# Patient Record
Sex: FEMALE | Race: BLACK OR AFRICAN AMERICAN | NOT HISPANIC OR LATINO | Employment: UNEMPLOYED | URBAN - METROPOLITAN AREA
[De-identification: names, ages, dates, MRNs, and addresses within clinical notes are randomized per-mention and may not be internally consistent; named-entity substitution may affect disease eponyms.]

---

## 2023-02-25 PROBLEM — L74.9 SWEATING ABNORMALITY: Status: ACTIVE | Noted: 2023-02-25

## 2023-02-25 PROBLEM — B97.7 HIGH RISK HPV INFECTION: Status: ACTIVE | Noted: 2023-02-25

## 2023-02-25 PROBLEM — N18.6 ESRD (END STAGE RENAL DISEASE) ON DIALYSIS (MULTI): Status: ACTIVE | Noted: 2023-02-25

## 2023-02-25 PROBLEM — R06.02 SHORTNESS OF BREATH: Status: ACTIVE | Noted: 2023-02-25

## 2023-02-25 PROBLEM — Z94.9 TRANSPLANT: Status: ACTIVE | Noted: 2023-02-25

## 2023-02-25 PROBLEM — F41.9 ANXIETY: Status: ACTIVE | Noted: 2023-02-25

## 2023-02-25 PROBLEM — R09.89 CAROTID BRUIT: Status: ACTIVE | Noted: 2023-02-25

## 2023-02-25 PROBLEM — K29.70 GASTRITIS: Status: ACTIVE | Noted: 2023-02-25

## 2023-02-25 PROBLEM — N17.9 ACUTE KIDNEY INJURY SUPERIMPOSED ON CHRONIC KIDNEY DISEASE (CMS-HCC): Status: ACTIVE | Noted: 2023-02-25

## 2023-02-25 PROBLEM — N18.9 ACUTE KIDNEY INJURY SUPERIMPOSED ON CHRONIC KIDNEY DISEASE (CMS-HCC): Status: ACTIVE | Noted: 2023-02-25

## 2023-02-25 PROBLEM — R87.612 LOW GRADE SQUAMOUS INTRAEPITH LESION ON CYTOLOGIC SMEAR CERVIX (LGSIL): Status: ACTIVE | Noted: 2023-02-25

## 2023-02-25 PROBLEM — I10 HYPERTENSION: Status: ACTIVE | Noted: 2023-02-25

## 2023-02-25 PROBLEM — H52.209 ASTIGMATISM WITH PRESBYOPIA: Status: ACTIVE | Noted: 2023-02-25

## 2023-02-25 PROBLEM — N89.8 VAGINAL DRYNESS: Status: ACTIVE | Noted: 2023-02-25

## 2023-02-25 PROBLEM — G43.E09 CHRONIC MIGRAINE WITH AURA: Status: ACTIVE | Noted: 2023-02-25

## 2023-02-25 PROBLEM — Q12.0 CONGENITAL CATARACT: Status: ACTIVE | Noted: 2023-02-25

## 2023-02-25 PROBLEM — N13.30 HYDRONEPHROSIS: Status: ACTIVE | Noted: 2023-02-25

## 2023-02-25 PROBLEM — R31.9 HEMATURIA: Status: ACTIVE | Noted: 2023-02-25

## 2023-02-25 PROBLEM — T82.590A DIALYSIS AV FISTULA MALFUNCTION (CMS-HCC): Status: ACTIVE | Noted: 2023-02-25

## 2023-02-25 PROBLEM — F17.200 NICOTINE DEPENDENCE: Status: ACTIVE | Noted: 2023-02-25

## 2023-02-25 PROBLEM — G62.9 NEUROPATHY: Status: ACTIVE | Noted: 2023-02-25

## 2023-02-25 PROBLEM — N19 RENAL FAILURE: Status: ACTIVE | Noted: 2023-02-25

## 2023-02-25 PROBLEM — Z99.2 HEMODIALYSIS PATIENT (CMS-HCC): Status: ACTIVE | Noted: 2023-02-25

## 2023-02-25 PROBLEM — Z99.2 ESRD (END STAGE RENAL DISEASE) ON DIALYSIS (MULTI): Status: ACTIVE | Noted: 2023-02-25

## 2023-02-25 PROBLEM — E11.9 DIABETES MELLITUS TYPE 2, UNCOMPLICATED (MULTI): Status: ACTIVE | Noted: 2023-02-25

## 2023-02-25 PROBLEM — D50.9 IRON DEFICIENCY ANEMIA: Status: ACTIVE | Noted: 2023-02-25

## 2023-02-25 PROBLEM — G43.909 MIGRAINE: Status: ACTIVE | Noted: 2023-02-25

## 2023-02-25 PROBLEM — M25.511 SHOULDER PAIN, BILATERAL: Status: ACTIVE | Noted: 2023-02-25

## 2023-02-25 PROBLEM — R87.612 LOW GRADE SQUAMOUS INTRAEPITHELIAL LESION ON CYTOLOGIC SMEAR OF CERVIX (LGSIL): Status: ACTIVE | Noted: 2023-02-25

## 2023-02-25 PROBLEM — H52.4 ASTIGMATISM WITH PRESBYOPIA: Status: ACTIVE | Noted: 2023-02-25

## 2023-02-25 PROBLEM — R05.8 PRODUCTIVE COUGH: Status: ACTIVE | Noted: 2023-02-25

## 2023-02-25 PROBLEM — I50.30 HEART FAILURE WITH PRESERVED LEFT VENTRICULAR FUNCTION (HFPEF) (MULTI): Status: ACTIVE | Noted: 2023-02-25

## 2023-02-25 PROBLEM — L02.419 ABSCESS OF AXILLARY REGION: Status: ACTIVE | Noted: 2023-02-25

## 2023-02-25 PROBLEM — I31.39 PERICARDIAL EFFUSION (HHS-HCC): Status: ACTIVE | Noted: 2023-02-25

## 2023-02-25 PROBLEM — M25.512 SHOULDER PAIN, BILATERAL: Status: ACTIVE | Noted: 2023-02-25

## 2023-02-25 RX ORDER — FUROSEMIDE 40 MG/1
40 TABLET ORAL 2 TIMES DAILY
COMMUNITY
End: 2023-10-26 | Stop reason: SDUPTHER

## 2023-02-25 RX ORDER — FLUTICASONE PROPIONATE AND SALMETEROL 100; 50 UG/1; UG/1
1 POWDER RESPIRATORY (INHALATION) EVERY 12 HOURS
COMMUNITY
Start: 2022-01-27 | End: 2023-10-26 | Stop reason: SDUPTHER

## 2023-02-25 RX ORDER — HYDRALAZINE HYDROCHLORIDE 100 MG/1
1 TABLET, FILM COATED ORAL 2 TIMES DAILY
COMMUNITY
Start: 2021-09-18 | End: 2023-10-26 | Stop reason: ALTCHOICE

## 2023-02-25 RX ORDER — GABAPENTIN 300 MG/1
1 CAPSULE ORAL NIGHTLY
COMMUNITY
Start: 2017-04-21 | End: 2023-10-26 | Stop reason: SDUPTHER

## 2023-02-25 RX ORDER — CARVEDILOL 25 MG/1
1 TABLET ORAL 2 TIMES DAILY
COMMUNITY
Start: 2021-09-18 | End: 2023-10-26 | Stop reason: SDUPTHER

## 2023-02-25 RX ORDER — CLONIDINE HYDROCHLORIDE 0.1 MG/1
0.1 TABLET ORAL 2 TIMES DAILY
COMMUNITY
End: 2023-06-30

## 2023-02-25 RX ORDER — ATORVASTATIN CALCIUM 80 MG/1
1 TABLET, FILM COATED ORAL NIGHTLY
COMMUNITY
Start: 2021-04-19 | End: 2023-10-26 | Stop reason: SDUPTHER

## 2023-02-25 RX ORDER — LOSARTAN POTASSIUM 50 MG/1
1 TABLET ORAL DAILY
COMMUNITY
Start: 2022-09-12 | End: 2023-06-30

## 2023-02-25 RX ORDER — MULTIVITAMIN
1 TABLET ORAL DAILY
Status: ON HOLD | COMMUNITY
End: 2024-01-05 | Stop reason: ALTCHOICE

## 2023-02-25 RX ORDER — VALSARTAN 160 MG/1
160 TABLET ORAL DAILY
COMMUNITY
End: 2023-10-26 | Stop reason: SDUPTHER

## 2023-02-25 RX ORDER — HYDROXYZINE HYDROCHLORIDE 10 MG/1
1 TABLET, FILM COATED ORAL 3 TIMES DAILY PRN
COMMUNITY
Start: 2022-01-18 | End: 2023-10-26 | Stop reason: SDUPTHER

## 2023-02-25 RX ORDER — SUMATRIPTAN 50 MG/1
50 TABLET, FILM COATED ORAL ONCE AS NEEDED
Status: ON HOLD | COMMUNITY
Start: 2021-12-13 | End: 2024-01-05 | Stop reason: ALTCHOICE

## 2023-02-25 RX ORDER — AMLODIPINE BESYLATE 10 MG/1
1 TABLET ORAL DAILY
COMMUNITY
End: 2023-10-26 | Stop reason: ALTCHOICE

## 2023-02-25 RX ORDER — ALBUTEROL SULFATE 90 UG/1
1-2 AEROSOL, METERED RESPIRATORY (INHALATION)
COMMUNITY
Start: 2022-01-07 | End: 2023-10-26 | Stop reason: ALTCHOICE

## 2023-03-08 ENCOUNTER — APPOINTMENT (OUTPATIENT)
Dept: PRIMARY CARE | Facility: CLINIC | Age: 53
End: 2023-03-08
Payer: MEDICARE

## 2023-03-09 ENCOUNTER — OFFICE VISIT (OUTPATIENT)
Dept: PRIMARY CARE | Facility: CLINIC | Age: 53
End: 2023-03-09
Payer: MEDICARE

## 2023-03-09 VITALS
HEIGHT: 56 IN | WEIGHT: 141.2 LBS | HEART RATE: 97 BPM | TEMPERATURE: 97.9 F | BODY MASS INDEX: 31.76 KG/M2 | OXYGEN SATURATION: 98 % | DIASTOLIC BLOOD PRESSURE: 72 MMHG | SYSTOLIC BLOOD PRESSURE: 156 MMHG

## 2023-03-09 DIAGNOSIS — R06.02 SHORTNESS OF BREATH: Primary | ICD-10-CM

## 2023-03-09 DIAGNOSIS — R26.2 DIFFICULTY IN WALKING: ICD-10-CM

## 2023-03-09 PROCEDURE — 3066F NEPHROPATHY DOC TX: CPT | Performed by: STUDENT IN AN ORGANIZED HEALTH CARE EDUCATION/TRAINING PROGRAM

## 2023-03-09 PROCEDURE — 3077F SYST BP >= 140 MM HG: CPT | Performed by: STUDENT IN AN ORGANIZED HEALTH CARE EDUCATION/TRAINING PROGRAM

## 2023-03-09 PROCEDURE — 4010F ACE/ARB THERAPY RXD/TAKEN: CPT | Performed by: STUDENT IN AN ORGANIZED HEALTH CARE EDUCATION/TRAINING PROGRAM

## 2023-03-09 PROCEDURE — 99214 OFFICE O/P EST MOD 30 MIN: CPT | Performed by: STUDENT IN AN ORGANIZED HEALTH CARE EDUCATION/TRAINING PROGRAM

## 2023-03-09 PROCEDURE — 3078F DIAST BP <80 MM HG: CPT | Performed by: STUDENT IN AN ORGANIZED HEALTH CARE EDUCATION/TRAINING PROGRAM

## 2023-03-09 ASSESSMENT — PAIN SCALES - GENERAL: PAINLEVEL: 0-NO PAIN

## 2023-03-09 NOTE — PROGRESS NOTES
"Subjective   Patient ID: Stacey Heath is a 52 y.o. female who presents for Follow-up and Shortness of Breath.    Hospital Follow up  - Originally admitted for shortness of breath and then you were discharged on 02/23/23  - Symptoms were thought to be due to viral gastroenteritis due to abdominal pain   - Readmitted to [facility] on 02/27/23 due to continued difficulty with evidence of pulmonary edema  - Was admitted for 4 days and discharged after breathing had improved; patient received BiPAP for treatment   - Denies being started on any new medications; no adjustments made to regimen  - Spoke with nephro who recommended outpatient blood transfusion  - Requesting Zofran for nausea that occurs after dialysis       Review of Systems  Negative unless mentioned in HPI    Objective   /72 (BP Location: Left arm, Patient Position: Sitting, BP Cuff Size: Adult)   Pulse 97   Temp 36.6 °C (97.9 °F) (Temporal)   Ht 1.41 m (4' 7.5\")   Wt 64 kg (141 lb 3.2 oz)   SpO2 98%   BMI 32.23 kg/m²     Physical Exam  Constitutional: Well developed, awake, alert, oriented x3  Head and Face: NCAT  Eyes: Normal external exam, EOMI  ENT: Normal external inspection of ears and nose. Oropharynx normal.  Cardiovascular: RRR, S1/S2, no murmurs, rubs, or gallops, radial pulses +2, no edema of extremities  Pulmonary: CTAB, no respiratory distress.  Abdomen: +BS, soft, non-tender, nondistended, no guarding or rebound, no masses noted  Neuro: A&O x3, CN II-XII grossly intact  MSK: No joint swelling, normal movements of all extremities. Range of motion- normal.  Skin- No lesions, contusions, or erythema.  Psychiatric: Judgment intact. Appropriate mood and behavior     Assessment/Plan   52 year old F presenting to the clinic for a hospital follow up visit .    #Hospital follow up  - No adjustments made to medications while admitted; medications reviewed   - Renewed prescription for zofran for nausea occurring at dialysis   - Discussed " outpatient blood transfusions; will defer transfusion plan to nephrology at this time   - Prescription for rolling walker done    Plan for patient to follow up in clinic in the next 2-3 months for continued follow up.    Discussed with Dr. Taras Payne MD PGY-3  Family Medicine   [facility]

## 2023-03-24 NOTE — PROGRESS NOTES
I saw and evaluated the patient. I personally obtained the key and critical portions of the history and physical exam or was physically present for key and critical portions performed by the resident/fellow. I reviewed the resident/fellow's documentation and discussed the patient with the resident/fellow. I agree with the resident/fellow's medical decision making as documented in the note with the exception/addition of the following:  She was hospitalized for pulmonary edema owing to volume overload owing to missed dialysis sessions.   She required Bipap for aspiratory failure. She says that she has not missed any sessions [facility] since hospital discharge.  She feels well now and has no signs of pulmonary edema.  O2 sat 98% on ambient air.  Want s to continue to receive renal dialysis.      Deb Grajeda MD

## 2023-05-18 LAB
ALANINE AMINOTRANSFERASE (SGPT) (U/L) IN SER/PLAS: 5 U/L (ref 7–45)
ALBUMIN (G/DL) IN SER/PLAS: 2.8 G/DL (ref 3.4–5)
ALKALINE PHOSPHATASE (U/L) IN SER/PLAS: 87 U/L (ref 33–110)
ANION GAP IN SER/PLAS: 19 MMOL/L (ref 10–20)
ASPARTATE AMINOTRANSFERASE (SGOT) (U/L) IN SER/PLAS: 23 U/L (ref 9–39)
BILIRUBIN TOTAL (MG/DL) IN SER/PLAS: 0.3 MG/DL (ref 0–1.2)
CALCIUM (MG/DL) IN SER/PLAS: 9.2 MG/DL (ref 8.6–10.3)
CARBON DIOXIDE, TOTAL (MMOL/L) IN SER/PLAS: 29 MMOL/L (ref 21–32)
CHLORIDE (MMOL/L) IN SER/PLAS: 92 MMOL/L (ref 98–107)
CREATININE (MG/DL) IN SER/PLAS: 8.95 MG/DL (ref 0.5–1.05)
ERYTHROCYTE DISTRIBUTION WIDTH (RATIO) BY AUTOMATED COUNT: 15.2 % (ref 11.5–14.5)
ERYTHROCYTE MEAN CORPUSCULAR HEMOGLOBIN CONCENTRATION (G/DL) BY AUTOMATED: 29.2 G/DL (ref 32–36)
ERYTHROCYTE MEAN CORPUSCULAR VOLUME (FL) BY AUTOMATED COUNT: 98 FL (ref 80–100)
ERYTHROCYTES (10*6/UL) IN BLOOD BY AUTOMATED COUNT: 2.69 X10E12/L (ref 4–5.2)
GFR FEMALE: 5 ML/MIN/1.73M2
GLUCOSE (MG/DL) IN SER/PLAS: 59 MG/DL (ref 74–99)
HEMATOCRIT (%) IN BLOOD BY AUTOMATED COUNT: 26.4 % (ref 36–46)
HEMOGLOBIN (G/DL) IN BLOOD: 7.7 G/DL (ref 12–16)
LEUKOCYTES (10*3/UL) IN BLOOD BY AUTOMATED COUNT: 12.3 X10E9/L (ref 4.4–11.3)
PLATELETS (10*3/UL) IN BLOOD AUTOMATED COUNT: 453 X10E9/L (ref 150–450)
POTASSIUM (MMOL/L) IN SER/PLAS: 6.1 MMOL/L (ref 3.5–5.3)
PROTEIN TOTAL: 5.9 G/DL (ref 6.4–8.2)
SODIUM (MMOL/L) IN SER/PLAS: 134 MMOL/L (ref 136–145)
UREA NITROGEN (MG/DL) IN SER/PLAS: 28 MG/DL (ref 6–23)

## 2023-05-21 LAB
ALANINE AMINOTRANSFERASE (SGPT) (U/L) IN SER/PLAS: 11 U/L (ref 7–45)
ALBUMIN (G/DL) IN SER/PLAS: 3 G/DL (ref 3.4–5)
ALKALINE PHOSPHATASE (U/L) IN SER/PLAS: 91 U/L (ref 33–110)
ANION GAP IN SER/PLAS: 14 MMOL/L (ref 10–20)
ASPARTATE AMINOTRANSFERASE (SGOT) (U/L) IN SER/PLAS: 30 U/L (ref 9–39)
BILIRUBIN TOTAL (MG/DL) IN SER/PLAS: 0.3 MG/DL (ref 0–1.2)
CALCIUM (MG/DL) IN SER/PLAS: 9.9 MG/DL (ref 8.6–10.3)
CARBON DIOXIDE, TOTAL (MMOL/L) IN SER/PLAS: 31 MMOL/L (ref 21–32)
CHLORIDE (MMOL/L) IN SER/PLAS: 98 MMOL/L (ref 98–107)
CREATININE (MG/DL) IN SER/PLAS: 4.48 MG/DL (ref 0.5–1.05)
ERYTHROCYTE DISTRIBUTION WIDTH (RATIO) BY AUTOMATED COUNT: 15.1 % (ref 11.5–14.5)
ERYTHROCYTE MEAN CORPUSCULAR HEMOGLOBIN CONCENTRATION (G/DL) BY AUTOMATED: 28.7 G/DL (ref 32–36)
ERYTHROCYTE MEAN CORPUSCULAR VOLUME (FL) BY AUTOMATED COUNT: 97 FL (ref 80–100)
ERYTHROCYTES (10*6/UL) IN BLOOD BY AUTOMATED COUNT: 3.02 X10E12/L (ref 4–5.2)
GFR FEMALE: 11 ML/MIN/1.73M2
GLUCOSE (MG/DL) IN SER/PLAS: 100 MG/DL (ref 74–99)
HEMATOCRIT (%) IN BLOOD BY AUTOMATED COUNT: 29.3 % (ref 36–46)
HEMOGLOBIN (G/DL) IN BLOOD: 8.4 G/DL (ref 12–16)
LEUKOCYTES (10*3/UL) IN BLOOD BY AUTOMATED COUNT: 8.9 X10E9/L (ref 4.4–11.3)
PLATELETS (10*3/UL) IN BLOOD AUTOMATED COUNT: 450 X10E9/L (ref 150–450)
POTASSIUM (MMOL/L) IN SER/PLAS: 4.6 MMOL/L (ref 3.5–5.3)
PROTEIN TOTAL: 6.6 G/DL (ref 6.4–8.2)
SODIUM (MMOL/L) IN SER/PLAS: 138 MMOL/L (ref 136–145)
UREA NITROGEN (MG/DL) IN SER/PLAS: 12 MG/DL (ref 6–23)

## 2023-05-23 LAB
ANION GAP IN SER/PLAS: 18 MMOL/L (ref 10–20)
CALCIUM (MG/DL) IN SER/PLAS: 10 MG/DL (ref 8.6–10.3)
CARBON DIOXIDE, TOTAL (MMOL/L) IN SER/PLAS: 29 MMOL/L (ref 21–32)
CHLORIDE (MMOL/L) IN SER/PLAS: 96 MMOL/L (ref 98–107)
CREATININE (MG/DL) IN SER/PLAS: 8.9 MG/DL (ref 0.5–1.05)
ERYTHROCYTE DISTRIBUTION WIDTH (RATIO) BY AUTOMATED COUNT: 15.5 % (ref 11.5–14.5)
ERYTHROCYTE MEAN CORPUSCULAR HEMOGLOBIN CONCENTRATION (G/DL) BY AUTOMATED: 27.7 G/DL (ref 32–36)
ERYTHROCYTE MEAN CORPUSCULAR VOLUME (FL) BY AUTOMATED COUNT: 100 FL (ref 80–100)
ERYTHROCYTES (10*6/UL) IN BLOOD BY AUTOMATED COUNT: 3.11 X10E12/L (ref 4–5.2)
GFR FEMALE: 5 ML/MIN/1.73M2
GLUCOSE (MG/DL) IN SER/PLAS: 112 MG/DL (ref 74–99)
HEMATOCRIT (%) IN BLOOD BY AUTOMATED COUNT: 31 % (ref 36–46)
HEMOGLOBIN (G/DL) IN BLOOD: 8.6 G/DL (ref 12–16)
LEUKOCYTES (10*3/UL) IN BLOOD BY AUTOMATED COUNT: 8.7 X10E9/L (ref 4.4–11.3)
PLATELETS (10*3/UL) IN BLOOD AUTOMATED COUNT: 461 X10E9/L (ref 150–450)
POTASSIUM (MMOL/L) IN SER/PLAS: 5.5 MMOL/L (ref 3.5–5.3)
SODIUM (MMOL/L) IN SER/PLAS: 137 MMOL/L (ref 136–145)
UREA NITROGEN (MG/DL) IN SER/PLAS: 28 MG/DL (ref 6–23)

## 2023-05-26 ENCOUNTER — TELEPHONE (OUTPATIENT)
Dept: PRIMARY CARE | Facility: CLINIC | Age: 53
End: 2023-05-26

## 2023-05-26 NOTE — TELEPHONE ENCOUNTER
A representative from [facility] called requesting home healthcare authorization for patient    [facility]  Representative: Yen  [phone]

## 2023-06-09 ENCOUNTER — OFFICE VISIT (OUTPATIENT)
Dept: PRIMARY CARE | Facility: CLINIC | Age: 53
End: 2023-06-09
Payer: MEDICARE

## 2023-06-09 VITALS
DIASTOLIC BLOOD PRESSURE: 80 MMHG | TEMPERATURE: 98.1 F | SYSTOLIC BLOOD PRESSURE: 156 MMHG | HEIGHT: 55 IN | HEART RATE: 87 BPM | WEIGHT: 136.2 LBS | RESPIRATION RATE: 16 BRPM | BODY MASS INDEX: 31.52 KG/M2 | OXYGEN SATURATION: 99 %

## 2023-06-09 DIAGNOSIS — M25.512 BILATERAL SHOULDER PAIN, UNSPECIFIED CHRONICITY: ICD-10-CM

## 2023-06-09 DIAGNOSIS — M25.511 BILATERAL SHOULDER PAIN, UNSPECIFIED CHRONICITY: ICD-10-CM

## 2023-06-09 DIAGNOSIS — R06.02 SOB (SHORTNESS OF BREATH): Primary | ICD-10-CM

## 2023-06-09 DIAGNOSIS — N76.0 ACUTE VAGINITIS: ICD-10-CM

## 2023-06-09 PROCEDURE — 3044F HG A1C LEVEL LT 7.0%: CPT | Performed by: STUDENT IN AN ORGANIZED HEALTH CARE EDUCATION/TRAINING PROGRAM

## 2023-06-09 PROCEDURE — 3079F DIAST BP 80-89 MM HG: CPT | Performed by: STUDENT IN AN ORGANIZED HEALTH CARE EDUCATION/TRAINING PROGRAM

## 2023-06-09 PROCEDURE — 4010F ACE/ARB THERAPY RXD/TAKEN: CPT | Performed by: STUDENT IN AN ORGANIZED HEALTH CARE EDUCATION/TRAINING PROGRAM

## 2023-06-09 PROCEDURE — 3008F BODY MASS INDEX DOCD: CPT | Performed by: STUDENT IN AN ORGANIZED HEALTH CARE EDUCATION/TRAINING PROGRAM

## 2023-06-09 PROCEDURE — 1036F TOBACCO NON-USER: CPT | Performed by: STUDENT IN AN ORGANIZED HEALTH CARE EDUCATION/TRAINING PROGRAM

## 2023-06-09 PROCEDURE — 3066F NEPHROPATHY DOC TX: CPT | Performed by: STUDENT IN AN ORGANIZED HEALTH CARE EDUCATION/TRAINING PROGRAM

## 2023-06-09 PROCEDURE — 99213 OFFICE O/P EST LOW 20 MIN: CPT | Performed by: STUDENT IN AN ORGANIZED HEALTH CARE EDUCATION/TRAINING PROGRAM

## 2023-06-09 PROCEDURE — 3077F SYST BP >= 140 MM HG: CPT | Performed by: STUDENT IN AN ORGANIZED HEALTH CARE EDUCATION/TRAINING PROGRAM

## 2023-06-09 RX ORDER — FLUCONAZOLE 150 MG/1
150 TABLET ORAL ONCE
Qty: 1 TABLET | Refills: 1 | Status: SHIPPED | OUTPATIENT
Start: 2023-06-09 | End: 2023-06-09

## 2023-06-09 ASSESSMENT — PAIN SCALES - GENERAL: PAINLEVEL: 6

## 2023-06-09 NOTE — PROGRESS NOTES
"Subjective   Patient ID: Stacey Heath is a 53 y.o. female who presents for Follow-up (Breathing ) and Breathing Problem.    HPI   Patient is here today for hospital follow up   She was admitted April 27-May/16 and then re-admitted May18- May20th   She was admitted for hypertensive urgency, fever and chills and was found to be bacteremic.   It was noted that the patient had multiple admissions with bacteremia and there was a questionable diagnosis of endocarditis during her admission given her bacteremia.   During her admission she also had a washout procedure of her L&R shoulders due to a concern of possible septic arthritis as a source of bacteremia.   Patient was on IV antibiotics and she was discharged on IV daptomycin with a stop date 6/1/2023   Patient was discharged to SNF  She has been taking all her medications, she didn't miss any doses   Dialysis TTS, no missed HD, through [facility], [facility] dry weight 64 kg (doesn't think it is correct)   No fever, no chills since leaving the hospital and the SNF   Coughing, but no phlegm or discharge    She has been doing ok, good days and bad days   Experiencing on and off SOB without associated CP   No associated lower extremity edema   Makes little urina and there has been no change to the amount of her urine     Review of Systems  Review of systems is negative besides what is mentioned in the HPI      Objective   /80 (BP Location: Left arm, Patient Position: Sitting, BP Cuff Size: Small adult)   Pulse 87   Temp 36.7 °C (98.1 °F) (Temporal)   Resp 16   Ht 1.397 m (4' 7\")   Wt 61.8 kg (136 lb 3.2 oz)   SpO2 99%   BMI 31.66 kg/m²     Physical Exam  General: Alert and oriented*3, not in acute distress   Cardiac: S1, S2 normal, no murmurs, +1 bilateral lower extremity edema.  Respiratory: CTAB, no wheezing or crackles   Skin: shoulder incision site healed well, no surrounding erythema, scar hyperpigmented, no drainage or induration  Psych: appropriate mood and " affect to the clinical setting      Assessment/Plan   Ms. 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   Here today for hospital follow after an admission for bacteremia.   No signs to suggest any infection.   No signs to suggest fluid overload (no crackles, mild lower extremity edema), no changes to weight per patient's reported dry weight (which she reports is inaccurate.   Will order a CXR to assess if there are any signs of volume overload     Patient's HPI, physical exam and plan of care was discussed with the attending physician Dr. Serena Romero MD  Family Medicine, PGY-3  [place]

## 2023-06-13 NOTE — PROGRESS NOTES
I reviewed with the resident the medical history and the resident’s findings on physical examination.  I discussed with the resident the patient’s diagnosis and concur with the treatment plan as documented in the resident note.   Here for Hospital follow up: slowly improving.  Luisa Lyons MD

## 2023-06-29 ENCOUNTER — APPOINTMENT (OUTPATIENT)
Dept: PRIMARY CARE | Facility: CLINIC | Age: 53
End: 2023-06-29
Payer: MEDICARE

## 2023-06-30 ENCOUNTER — OFFICE VISIT (OUTPATIENT)
Dept: PRIMARY CARE | Facility: CLINIC | Age: 53
End: 2023-06-30
Payer: MEDICARE

## 2023-06-30 VITALS
SYSTOLIC BLOOD PRESSURE: 192 MMHG | WEIGHT: 133.7 LBS | BODY MASS INDEX: 30.94 KG/M2 | OXYGEN SATURATION: 99 % | TEMPERATURE: 96.1 F | HEART RATE: 89 BPM | HEIGHT: 55 IN | RESPIRATION RATE: 16 BRPM | DIASTOLIC BLOOD PRESSURE: 88 MMHG

## 2023-06-30 DIAGNOSIS — K59.04 CHRONIC IDIOPATHIC CONSTIPATION: Primary | ICD-10-CM

## 2023-06-30 DIAGNOSIS — R11.2 NAUSEA AND VOMITING, UNSPECIFIED VOMITING TYPE: ICD-10-CM

## 2023-06-30 PROCEDURE — 3008F BODY MASS INDEX DOCD: CPT | Performed by: STUDENT IN AN ORGANIZED HEALTH CARE EDUCATION/TRAINING PROGRAM

## 2023-06-30 PROCEDURE — 3079F DIAST BP 80-89 MM HG: CPT | Performed by: STUDENT IN AN ORGANIZED HEALTH CARE EDUCATION/TRAINING PROGRAM

## 2023-06-30 PROCEDURE — 99214 OFFICE O/P EST MOD 30 MIN: CPT | Performed by: STUDENT IN AN ORGANIZED HEALTH CARE EDUCATION/TRAINING PROGRAM

## 2023-06-30 PROCEDURE — 3044F HG A1C LEVEL LT 7.0%: CPT | Performed by: STUDENT IN AN ORGANIZED HEALTH CARE EDUCATION/TRAINING PROGRAM

## 2023-06-30 PROCEDURE — 3077F SYST BP >= 140 MM HG: CPT | Performed by: STUDENT IN AN ORGANIZED HEALTH CARE EDUCATION/TRAINING PROGRAM

## 2023-06-30 PROCEDURE — 4010F ACE/ARB THERAPY RXD/TAKEN: CPT | Performed by: STUDENT IN AN ORGANIZED HEALTH CARE EDUCATION/TRAINING PROGRAM

## 2023-06-30 PROCEDURE — 3066F NEPHROPATHY DOC TX: CPT | Performed by: STUDENT IN AN ORGANIZED HEALTH CARE EDUCATION/TRAINING PROGRAM

## 2023-06-30 PROCEDURE — 1036F TOBACCO NON-USER: CPT | Performed by: STUDENT IN AN ORGANIZED HEALTH CARE EDUCATION/TRAINING PROGRAM

## 2023-06-30 RX ORDER — POLYETHYLENE GLYCOL 3350 17 G/17G
17 POWDER, FOR SOLUTION ORAL 2 TIMES DAILY
Qty: 72 EACH | Refills: 3 | Status: SHIPPED | OUTPATIENT
Start: 2023-06-30 | End: 2024-01-09 | Stop reason: HOSPADM

## 2023-06-30 RX ORDER — DOCUSATE SODIUM 100 MG/1
200 CAPSULE, LIQUID FILLED ORAL 2 TIMES DAILY
Qty: 60 CAPSULE | Refills: 0 | Status: SHIPPED | OUTPATIENT
Start: 2023-06-30 | End: 2023-10-26 | Stop reason: ALTCHOICE

## 2023-06-30 RX ORDER — ONDANSETRON 4 MG/1
4 TABLET, FILM COATED ORAL EVERY 8 HOURS PRN
Qty: 90 TABLET | Refills: 0 | Status: ON HOLD | OUTPATIENT
Start: 2023-06-30 | End: 2024-01-09 | Stop reason: SDUPTHER

## 2023-06-30 ASSESSMENT — PAIN SCALES - GENERAL: PAINLEVEL: 4

## 2023-06-30 NOTE — PROGRESS NOTES
"Subjective   Patient ID: Stacey Heath is a 53 y.o. female who presents for Follow-up (Just released from hospital, blood pressure ).  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    #HTN  Had dialysis yesterday  And at dialysis her blood pressure is high before starting, tends to lower during, and is high again post dialysis  She has not been seen by her nephro doctor since march  She was recently admitted and discharged on 6/21 for hypertensive emergency , clonidine patch was added to her medications, she is currently wearing  She endorses compliance with her bp medications and states she takes them everyday  Tales Amlodipine in am, takes Carvedilol morning night, Clonidine patch every 7 days- put patch on Wednesday, Lasix morning and night, Hydralazine TID and Valsartan ?  Denies any chest pain, sob, dizziness, did have a headache earlier this week but denies current headache.     #abdominal pain  -associated with vomiting, nonbloody  -usually in the morning  -feels nausea before  -Epigastric abdominal  -denies smoking and etoh since 2 years   -she is trying to get a kidney transplant   -had abdominal pain in the past when she had her gallbladder removed   -ate some oatmeal this morning but through it up  -sharp pains that intermittent that last for a couple of hours   -denies marijuana use  -states she has a bowel movement only once a week     Review of Systems  ROS negative except for above mentioned   Objective   BP (!) 206/60 (BP Location: Left arm, Patient Position: Sitting, BP Cuff Size: Small adult)   Pulse 92   Temp 35.6 °C (96.1 °F) (Temporal)   Resp 16   Ht 1.397 m (4' 7\")   Wt 60.6 kg (133 lb 11.2 oz)   SpO2 97%   BMI 31.07 kg/m²     Physical Exam  General: Well developed, well nourished, alert and cooperative, appears to be in no acute " distress.   HEENT: Normocephalic, atraumatic.   Cardiac: continuous systolic murmur appreciated.   Lungs: Clear to auscultation bilaterally. No rales, rhonchi, wheezing, diminished breath sounds.   Abdomen: Bowel sounds x4. Soft, nondistended, epigastric, RUQ and LUQ tenderness on palpation. No guarding, rebound, or masses.   MSK: ROM intact spine and extremities.  No edema. Peripheral pulses intact.   Neuro: no focal deficits noted    Skin: Dialysis access sited noted on right arm.   Psych: Oriented to person, place, and time.    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  Problem List Items Addressed This Visit    None  #HTN  Chronic, uncontrolled likely 2/2 to ESRD  -continue with current medication regimen  -nephrology follow up strongly encouraged    #Abdominal pain  With associated n/v  Likely 2/2 to constipation  -Start Miralax BID + colace BID until bowel movements regulate  -Zofran 4 mg prn for n/v  -red flag, rtc/ed signs discussed with patient, patient in agreement and understanding    Patient seen and discussed with attending, Dr. Taras Bustos MD  Family Medicine, PGY-3

## 2023-07-05 NOTE — PROGRESS NOTES
I saw and evaluated the patient. I personally obtained the key and critical portions of the history and physical exam or was physically present for key and critical portions performed by the resident/fellow. I reviewed the resident/fellow's documentation and discussed the patient with the resident/fellow. I agree with the resident/fellow's medical decision making as documented in the note with the exception/addition of the following:    Additional diagnoses addressed:  End-stage renal  disease, adherent to hemodialysis.  Labile, uncontrolled hypertension.    Hospital followup for hypertensive emergency (change in mental status, now resolved).    Plan is for this to be managed in conjunction with nephrologist during dialysis.  Does not appear grossly volume overloaded.  Clonidine patch started recently, and is in place.  May need dose titration.    Nuclear medicine cardiac stress test 5/26 did not reveal cause for heart murmur.  It does not sound like a rub.    Concerning diffuse nonspecific abdominal pain, frequent vomiting, and severe constipation.  All may be partly related to uremia, and even to medication side effects,  but so far, workup (CT abdomen and pelvis)  has not revealed specific pathology.        Deb Grajeda MD

## 2023-07-27 ENCOUNTER — OFFICE VISIT (OUTPATIENT)
Dept: PRIMARY CARE | Facility: CLINIC | Age: 53
End: 2023-07-27
Payer: COMMERCIAL

## 2023-07-27 VITALS
DIASTOLIC BLOOD PRESSURE: 76 MMHG | HEART RATE: 89 BPM | SYSTOLIC BLOOD PRESSURE: 131 MMHG | TEMPERATURE: 97.4 F | OXYGEN SATURATION: 97 %

## 2023-07-27 DIAGNOSIS — M25.512 CHRONIC PAIN OF BOTH SHOULDERS: Primary | ICD-10-CM

## 2023-07-27 DIAGNOSIS — K59.04 CHRONIC IDIOPATHIC CONSTIPATION: ICD-10-CM

## 2023-07-27 DIAGNOSIS — M25.511 CHRONIC PAIN OF BOTH SHOULDERS: Primary | ICD-10-CM

## 2023-07-27 DIAGNOSIS — G89.29 CHRONIC PAIN OF BOTH SHOULDERS: Primary | ICD-10-CM

## 2023-07-27 PROCEDURE — 99213 OFFICE O/P EST LOW 20 MIN: CPT

## 2023-07-27 PROCEDURE — 4010F ACE/ARB THERAPY RXD/TAKEN: CPT

## 2023-07-27 PROCEDURE — 3078F DIAST BP <80 MM HG: CPT

## 2023-07-27 PROCEDURE — 1036F TOBACCO NON-USER: CPT

## 2023-07-27 PROCEDURE — 3075F SYST BP GE 130 - 139MM HG: CPT

## 2023-07-27 PROCEDURE — 3008F BODY MASS INDEX DOCD: CPT

## 2023-07-27 PROCEDURE — 3044F HG A1C LEVEL LT 7.0%: CPT

## 2023-07-27 PROCEDURE — 3066F NEPHROPATHY DOC TX: CPT

## 2023-07-27 RX ORDER — POLYETHYLENE GLYCOL 3350 17 G/17G
17 POWDER, FOR SOLUTION ORAL DAILY
Qty: 30 PACKET | Refills: 2 | Status: SHIPPED | OUTPATIENT
Start: 2023-07-27 | End: 2023-10-25

## 2023-07-27 ASSESSMENT — PAIN SCALES - GENERAL: PAINLEVEL: 10-WORST PAIN EVER

## 2023-07-27 NOTE — PROGRESS NOTES
Subjective   Patient ID: Stacey Heath is a 53 y.o. female who presents for Follow-up.      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     #Right shoulder pain  - Onset a few weeks ago has gotten worse however chronic issue s/p washout of b/l shoulders 2/2 bacteremia concern  - Located Right anterior shoulder  - Characterized by pop sensation and anterior R should pain  - Aggravating abduction  - Tylenol doesn't  - XR (June 2023) Bilateral erosive changes of the greater tuberosities at the  insertions of the rotator cuff, left greater than right. This may be  a result of subinsertional osteolysis in the setting of end-stage  renal disease  - c/w Tylenol as unable to take NSAIDs  - PT  - Orthopedic referral if no improvement with therapy    #HTN  #ESRD on HD  -Recent hospitalization for large fluctuations in BP and ED visit for headache in setting of elevated BP. EKG troponin BNP chest x-ray and the labs as well as a CT head without any acute findings.  - /76. Had HD this morning at 5am  - can still have BP >200 prior   - TTS HD schedule  -Instructed to    Take amlodipine and carvedilol BEFORE dialysis   Take valsartan, carvedilol and hydralazine AFTER dialysis.   DO NOT take hydralazine prior to dialysis.   - Before HD amlodipine 10mg and Clonidine 0.1mg Takes hydralzine 100mg TID, Valsartan 160mg every day after HD.  - on non HD days, takes meds all at once. Feels drowsy and lightheaded. Sat on couch and not moving. BP 110s.  ----> take hydralazine 100mg BID away from  am pill pack     #HF  - takes Lasix 40mg BID , carvedilol 25mg BID    A1c 4.6 June 27    #Abdominal pain  - 2 months ago. Pain comes and goes but lasts for months  - Pain starts lower abdomen and migrates upper  - s/p cholecystectomy, appendectomy   - cannot tolerate oral intake  - Reports  emesis last night  - reports diarrhea.  - constipation BM 2-3 times weekly  miralax, stimulant,   - denies blood in stool  - Fiber helped with belly pain  - bentyl 10mg not working  - Diet- toast for breakfast that's it  ----> c/w bentyl?  ----> miralax   ----> restart fiber diet  ----> GI referall     Review of Systems    Previous history  Past Medical History:   Diagnosis Date    Breast pain 05/18/2021    History of breast pain    Dependence on renal dialysis (CMS/[facility]) 11/21/2022    Hemodialysis patient    Dry eye syndrome of bilateral lacrimal glands 03/07/2017    Dry eyes    Essential (primary) hypertension 12/27/2022    Hypertension    History of acute pancreatitis 12/21/2020    History of acute pancreatitis    Migraines     History of migraine    Other conditions influencing health status 09/01/2021    History of nephrostomy    Unspecified diastolic (congestive) heart failure (CMS/[facility]) 11/21/2022    Heart failure with preserved left ventricular function (HFpEF)    Vaginal dryness 07/29/2022    Vaginal dryness     Past Surgical History:   Procedure Laterality Date    APPENDECTOMY  03/07/2017    Appendectomy    CT ABDOMEN ANGIOGRAM W AND/OR WO IV CONTRAST  4/23/2023    CT ABDOMEN ANGIOGRAM W AND/OR WO IV CONTRAST [facility] CT    IR VENOGRAM DIALYSIS  8/5/2021    IR VENOGRAM DIALYSIS 8/5/2021    OTHER SURGICAL HISTORY  03/07/2017    Cystoscopy With Pyeloscopy With Removal Of Calculus    OTHER SURGICAL HISTORY  03/07/2017    Anoscopy For Polyp Removal    OTHER SURGICAL HISTORY  12/08/2021    Arteriovenous fistula creation procedure    OTHER SURGICAL HISTORY  12/08/2021    Dialysis tunneled catheter placement    TUBAL LIGATION  03/07/2017    Tubal Ligation     Social History     Tobacco Use    Smoking status: Never    Smokeless tobacco: Never   Substance Use Topics    Alcohol use: Never    Drug use: Never     Family History   Problem Relation Name Age of Onset    Other (Cerebrovascular Accident) Father      Heart failure  Paternal Grandmother      Breast cancer Paternal Grandmother      Other (Primary Cervical Cancer) Paternal Grandmother      Diabetes Paternal Grandfather       Allergies   Allergen Reactions    Codeine Unknown and Itching    Iodine Hives and Unknown    Shellfish Containing Products Swelling     SEAFOOD     Current Outpatient Medications   Medication Instructions    albuterol 90 mcg/actuation inhaler 1-2 puffs, inhalation, EVERY 4 TO 6 HOURS AS NEEDED.    amLODIPine (Norvasc) 10 mg tablet 1 tablet, oral, Daily, Take before dialysis.    atorvastatin (Lipitor) 80 mg tablet 1 tablet, oral, Nightly    carvedilol (Coreg) 25 mg tablet 1 tablet, oral, 2 times daily    docusate sodium (COLACE) 200 mg, oral, 2 times daily    fluticasone propion-salmeteroL (Advair Diskus) 100-50 mcg/dose diskus inhaler 1 puff, inhalation, Every 12 hours    furosemide (LASIX) 40 mg, oral, 2 times daily    gabapentin (Neurontin) 300 mg capsule 1 capsule, oral, Nightly    hydrALAZINE (Apresoline) 100 mg tablet 1 tablet, oral, 2 times daily    hydrOXYzine HCL (Atarax) 10 mg tablet 1 tablet, oral, 3 times daily PRN    multivitamin tablet 1 tablet, oral, Daily    ondansetron (ZOFRAN) 4 mg, oral, Every 8 hours PRN    polyethylene glycol (GLYCOLAX, MIRALAX) 17 g, oral, 2 times daily    polyethylene glycol (GLYCOLAX, MIRALAX) 17 g, oral, Daily, Mix 1 cap (17g) into 8 ounces of fluid.    SUMAtriptan (IMITREX) 50 mg, oral, Once as needed, MAY REPEAT EVERY 2 HOURS. MAX 200MG/DAY.    valsartan (DIOVAN) 160 mg, oral, Daily       Objective       Physical Exam      Assessment/Plan   Stacey Heath is a 53 y.o. female who presents for the concerns below:    Problem List Items Addressed This Visit    None  Visit Diagnoses       Chronic idiopathic constipation        Relevant Medications    polyethylene glycol (Glycolax, Miralax) 17 gram packet          #Right shoulder pain  - c/w Tylenol as unable to take NSAIDs  - PT referral   - Orthopedic referral if no  improvement with therapy    #HTN  #ESRD on HD  - Only change is to take Hydralazine 100mg BID  - TTS HD schedule   Take amlodipine and carvedilol BEFORE dialysis   Take valsartan, carvedilol and hydralazine AFTER dialysis.   DO NOT take hydralazine prior to dialysis.   - c/w Amlodipine 10mg and Clonidine 0.1mg,, Valsartan 160mg every day after HD    #HF  #ESRD  - takes Lasix 40mg BID , carvedilol 25mg BID    # Chronic Abdominal pain  - c/w bentyl  - Order miralax   - Recommend increasing fiber in diet  - GI referral if ineffective       Discussed with:  Dr. Demetrio Logan  Return in : 4 weeks    Portions of this note were generated using digital voice recognition software, and may contain grammatical errors       Carroll Medina, DO  PGY-2, Family Medicine

## 2023-08-14 ENCOUNTER — TELEPHONE (OUTPATIENT)
Dept: PRIMARY CARE | Facility: CLINIC | Age: 53
End: 2023-08-14

## 2023-08-14 NOTE — TELEPHONE ENCOUNTER
Patient is requesting a callback because she had surgery back in April on her Arm and she stated that she can't move it due to pain. Patient says that this is very urgent [phone]

## 2023-08-31 NOTE — PROGRESS NOTES
I reviewed with the resident the medical history and the resident’s findings on physical examination.  I discussed with the resident the patient’s diagnosis and concur with the treatment plan as documented in the resident note.     Sushma Jamil MD

## 2023-09-07 ENCOUNTER — APPOINTMENT (OUTPATIENT)
Dept: PRIMARY CARE | Facility: CLINIC | Age: 53
End: 2023-09-07
Payer: COMMERCIAL

## 2023-09-28 ENCOUNTER — DOCUMENTATION (OUTPATIENT)
Dept: BEHAVIORAL HEALTH | Facility: CLINIC | Age: 53
End: 2023-09-28
Payer: COMMERCIAL

## 2023-10-16 ENCOUNTER — TELEPHONE (OUTPATIENT)
Dept: BEHAVIORAL HEALTH | Facility: CLINIC | Age: 53
End: 2023-10-16
Payer: COMMERCIAL

## 2023-10-17 ENCOUNTER — DOCUMENTATION (OUTPATIENT)
Dept: BEHAVIORAL HEALTH | Facility: CLINIC | Age: 53
End: 2023-10-17

## 2023-10-17 DIAGNOSIS — F41.9 ANXIETY: ICD-10-CM

## 2023-10-17 SDOH — ECONOMIC STABILITY: TRANSPORTATION INSECURITY
IN THE PAST 12 MONTHS, HAS LACK OF TRANSPORTATION KEPT YOU FROM MEETINGS, WORK, OR FROM GETTING THINGS NEEDED FOR DAILY LIVING?: NO

## 2023-10-17 SDOH — ECONOMIC STABILITY: GENERAL
WHICH OF THE FOLLOWING DO YOU KNOW HOW TO USE AND HAVE ACCESS TO EVERY DAY? (CHOOSE ALL THAT APPLY): DESKTOP COMPUTER, LAPTOP COMPUTER, OR TABLET WITH BROADBAND INTERNET CONNECTION;SMARTPHONE WITH CELLULAR DATA PLAN

## 2023-10-17 SDOH — ECONOMIC STABILITY: INCOME INSECURITY: IN THE LAST 12 MONTHS, WAS THERE A TIME WHEN YOU WERE NOT ABLE TO PAY THE MORTGAGE OR RENT ON TIME?: NO

## 2023-10-17 SDOH — ECONOMIC STABILITY: FOOD INSECURITY: WITHIN THE PAST 12 MONTHS, THE FOOD YOU BOUGHT JUST DIDN'T LAST AND YOU DIDN'T HAVE MONEY TO GET MORE.: NEVER TRUE

## 2023-10-17 SDOH — ECONOMIC STABILITY: GENERAL
WHICH OF THE FOLLOWING WOULD YOU LIKE TO GET CONNECTED TO IN ORDER TO RECEIVE A DISCOUNT OR FOR FREE? (CHOOSE ALL THAT APPLY): DIGITAL/INTERNET SKILLS TRAINING

## 2023-10-17 SDOH — ECONOMIC STABILITY: FOOD INSECURITY: WITHIN THE PAST 12 MONTHS, YOU WORRIED THAT YOUR FOOD WOULD RUN OUT BEFORE YOU GOT MONEY TO BUY MORE.: NEVER TRUE

## 2023-10-17 SDOH — ECONOMIC STABILITY: HOUSING INSECURITY: IN THE LAST 12 MONTHS, HOW MANY PLACES HAVE YOU LIVED?: 1

## 2023-10-17 SDOH — HEALTH STABILITY: PHYSICAL HEALTH: ON AVERAGE, HOW MANY DAYS PER WEEK DO YOU ENGAGE IN MODERATE TO STRENUOUS EXERCISE (LIKE A BRISK WALK)?: 3 DAYS

## 2023-10-17 SDOH — ECONOMIC STABILITY: HOUSING INSECURITY
IN THE LAST 12 MONTHS, WAS THERE A TIME WHEN YOU DID NOT HAVE A STEADY PLACE TO SLEEP OR SLEPT IN A SHELTER (INCLUDING NOW)?: NO

## 2023-10-17 SDOH — ECONOMIC STABILITY: TRANSPORTATION INSECURITY
IN THE PAST 12 MONTHS, HAS THE LACK OF TRANSPORTATION KEPT YOU FROM MEDICAL APPOINTMENTS OR FROM GETTING MEDICATIONS?: NO

## 2023-10-17 SDOH — HEALTH STABILITY: PHYSICAL HEALTH: ON AVERAGE, HOW MANY MINUTES DO YOU ENGAGE IN EXERCISE AT THIS LEVEL?: 10 MIN

## 2023-10-17 ASSESSMENT — COLUMBIA-SUICIDE SEVERITY RATING SCALE - C-SSRS
4. HAVE YOU HAD THESE THOUGHTS AND HAD SOME INTENTION OF ACTING ON THEM?: NO
3. HAVE YOU BEEN THINKING ABOUT HOW YOU MIGHT KILL YOURSELF?: YES
1. IN THE PAST MONTH, HAVE YOU WISHED YOU WERE DEAD OR WISHED YOU COULD GO TO SLEEP AND NOT WAKE UP?: NO
ATTEMPT PAST THREE MONTHS: NO
TOTAL  NUMBER OF ABORTED OR SELF INTERRUPTED ATTEMPTS LIFETIME: NO
2. HAVE YOU ACTUALLY HAD ANY THOUGHTS OF KILLING YOURSELF?: YES
4. HAVE YOU HAD THESE THOUGHTS AND HAD SOME INTENTION OF ACTING ON THEM?: YES
5. HAVE YOU STARTED TO WORK OUT OR WORKED OUT THE DETAILS OF HOW TO KILL YOURSELF? DO YOU INTEND TO CARRY OUT THIS PLAN?: NO
5. HAVE YOU STARTED TO WORK OUT OR WORKED OUT THE DETAILS OF HOW TO KILL YOURSELF? DO YOU INTEND TO CARRY OUT THIS PLAN?: YES
6. HAVE YOU EVER DONE ANYTHING, STARTED TO DO ANYTHING, OR PREPARED TO DO ANYTHING TO END YOUR LIFE?: NO
TOTAL  NUMBER OF ACTUAL ATTEMPTS LIFETIME: 1
ATTEMPT LIFETIME: YES
1. HAVE YOU WISHED YOU WERE DEAD OR WISHED YOU COULD GO TO SLEEP AND NOT WAKE UP?: YES
2. HAVE YOU ACTUALLY HAD ANY THOUGHTS OF KILLING YOURSELF?: NO
TOTAL  NUMBER OF INTERRUPTED ATTEMPTS LIFETIME: NO

## 2023-10-17 ASSESSMENT — LIFESTYLE VARIABLES
AUDIT-C TOTAL SCORE: 0
SKIP TO QUESTIONS 9-10: 1
HOW OFTEN DO YOU HAVE SIX OR MORE DRINKS ON ONE OCCASION: NEVER
HOW MANY STANDARD DRINKS CONTAINING ALCOHOL DO YOU HAVE ON A TYPICAL DAY: PATIENT DOES NOT DRINK
HOW OFTEN DO YOU HAVE A DRINK CONTAINING ALCOHOL: NEVER

## 2023-10-17 ASSESSMENT — PATIENT HEALTH QUESTIONNAIRE - PHQ9
SUM OF ALL RESPONSES TO PHQ9 QUESTIONS 1 & 2: 0
2. FEELING DOWN, DEPRESSED OR HOPELESS: NOT AT ALL
1. LITTLE INTEREST OR PLEASURE IN DOING THINGS: NOT AT ALL

## 2023-10-17 ASSESSMENT — SOCIAL DETERMINANTS OF HEALTH (SDOH)
WITHIN THE LAST YEAR, HAVE YOU BEEN KICKED, HIT, SLAPPED, OR OTHERWISE PHYSICALLY HURT BY YOUR PARTNER OR EX-PARTNER?: NO
IN THE PAST 12 MONTHS, HAS THE ELECTRIC, GAS, OIL, OR WATER COMPANY THREATENED TO SHUT OFF SERVICE IN YOUR HOME?: NO
IN A TYPICAL WEEK, HOW MANY TIMES DO YOU TALK ON THE PHONE WITH FAMILY, FRIENDS, OR NEIGHBORS?: MORE THAN THREE TIMES A WEEK
HOW HARD IS IT FOR YOU TO PAY FOR THE VERY BASICS LIKE FOOD, HOUSING, MEDICAL CARE, AND HEATING?: NOT VERY HARD
HOW OFTEN DO YOU ATTEND CHURCH OR RELIGIOUS SERVICES?: MORE THAN 4 TIMES PER YEAR
ARE YOU MARRIED, WIDOWED, DIVORCED, SEPARATED, NEVER MARRIED, OR LIVING WITH A PARTNER?: LIVING WITH PARTNER
WITHIN THE LAST YEAR, HAVE YOU BEEN AFRAID OF YOUR PARTNER OR EX-PARTNER?: NO
WITHIN THE LAST YEAR, HAVE YOU BEEN HUMILIATED OR EMOTIONALLY ABUSED IN OTHER WAYS BY YOUR PARTNER OR EX-PARTNER?: NO
DO YOU BELONG TO ANY CLUBS OR ORGANIZATIONS SUCH AS CHURCH GROUPS UNIONS, FRATERNAL OR ATHLETIC GROUPS, OR SCHOOL GROUPS?: NO
WITHIN THE LAST YEAR, HAVE TO BEEN RAPED OR FORCED TO HAVE ANY KIND OF SEXUAL ACTIVITY BY YOUR PARTNER OR EX-PARTNER?: NO
HOW OFTEN DO YOU GET TOGETHER WITH FRIENDS OR RELATIVES?: ONCE A WEEK

## 2023-10-17 ASSESSMENT — ANXIETY QUESTIONNAIRES
5. BEING SO RESTLESS THAT IT IS HARD TO SIT STILL: NEARLY EVERY DAY
2. NOT BEING ABLE TO STOP OR CONTROL WORRYING: NEARLY EVERY DAY
IF YOU CHECKED OFF ANY PROBLEMS ON THIS QUESTIONNAIRE, HOW DIFFICULT HAVE THESE PROBLEMS MADE IT FOR YOU TO DO YOUR WORK, TAKE CARE OF THINGS AT HOME, OR GET ALONG WITH OTHER PEOPLE: NOT DIFFICULT AT ALL
1. FEELING NERVOUS, ANXIOUS, OR ON EDGE: NEARLY EVERY DAY
4. TROUBLE RELAXING: NOT AT ALL
GAD7 TOTAL SCORE: 15
7. FEELING AFRAID AS IF SOMETHING AWFUL MIGHT HAPPEN: NOT AT ALL
6. BECOMING EASILY ANNOYED OR IRRITABLE: NEARLY EVERY DAY
3. WORRYING TOO MUCH ABOUT DIFFERENT THINGS: NEARLY EVERY DAY

## 2023-10-18 ENCOUNTER — DOCUMENTATION (OUTPATIENT)
Dept: BEHAVIORAL HEALTH | Facility: CLINIC | Age: 53
End: 2023-10-18
Payer: COMMERCIAL

## 2023-10-18 NOTE — PROGRESS NOTES
Location: [facility]    Summary: This writer and Manuelito MONROY met with patient to do an initial assessment (See Manuelito MONROY note). Pt appears to have a good understanding of her medical conditions as well as her medications.     Roberta Gallego RN  [phone]

## 2023-10-26 ENCOUNTER — OFFICE VISIT (OUTPATIENT)
Dept: PRIMARY CARE | Facility: CLINIC | Age: 53
End: 2023-10-26
Payer: COMMERCIAL

## 2023-10-26 ENCOUNTER — PHARMACY VISIT (OUTPATIENT)
Dept: PHARMACY | Facility: CLINIC | Age: 53
End: 2023-10-26
Payer: MEDICARE

## 2023-10-26 VITALS
HEART RATE: 92 BPM | OXYGEN SATURATION: 94 % | TEMPERATURE: 96.3 F | BODY MASS INDEX: 29.54 KG/M2 | DIASTOLIC BLOOD PRESSURE: 72 MMHG | WEIGHT: 127.1 LBS | SYSTOLIC BLOOD PRESSURE: 202 MMHG

## 2023-10-26 DIAGNOSIS — E78.5 HYPERLIPIDEMIA, UNSPECIFIED HYPERLIPIDEMIA TYPE: ICD-10-CM

## 2023-10-26 DIAGNOSIS — I10 HYPERTENSION, UNSPECIFIED TYPE: Primary | ICD-10-CM

## 2023-10-26 DIAGNOSIS — J45.40 MODERATE PERSISTENT ASTHMA, UNSPECIFIED WHETHER COMPLICATED (HHS-HCC): ICD-10-CM

## 2023-10-26 DIAGNOSIS — E11.42 DIABETIC POLYNEUROPATHY ASSOCIATED WITH TYPE 2 DIABETES MELLITUS (MULTI): ICD-10-CM

## 2023-10-26 DIAGNOSIS — L29.9 ITCHING: ICD-10-CM

## 2023-10-26 DIAGNOSIS — Z09 HOSPITAL DISCHARGE FOLLOW-UP: ICD-10-CM

## 2023-10-26 PROCEDURE — 3008F BODY MASS INDEX DOCD: CPT

## 2023-10-26 PROCEDURE — RXMED WILLOW AMBULATORY MEDICATION CHARGE

## 2023-10-26 PROCEDURE — 1036F TOBACCO NON-USER: CPT

## 2023-10-26 PROCEDURE — 3078F DIAST BP <80 MM HG: CPT

## 2023-10-26 PROCEDURE — 3066F NEPHROPATHY DOC TX: CPT

## 2023-10-26 PROCEDURE — 3077F SYST BP >= 140 MM HG: CPT

## 2023-10-26 PROCEDURE — 3044F HG A1C LEVEL LT 7.0%: CPT

## 2023-10-26 PROCEDURE — 4010F ACE/ARB THERAPY RXD/TAKEN: CPT

## 2023-10-26 PROCEDURE — 99213 OFFICE O/P EST LOW 20 MIN: CPT

## 2023-10-26 RX ORDER — FLUTICASONE PROPIONATE AND SALMETEROL 100; 50 UG/1; UG/1
1 POWDER RESPIRATORY (INHALATION) EVERY 12 HOURS
Qty: 60 EACH | Refills: 2 | Status: ON HOLD | OUTPATIENT
Start: 2023-10-26 | End: 2024-01-09 | Stop reason: SDUPTHER

## 2023-10-26 RX ORDER — ATORVASTATIN CALCIUM 80 MG/1
80 TABLET, FILM COATED ORAL NIGHTLY
Qty: 30 TABLET | Refills: 2 | Status: ON HOLD | OUTPATIENT
Start: 2023-10-26 | End: 2024-01-09 | Stop reason: SDUPTHER

## 2023-10-26 RX ORDER — ALBUTEROL SULFATE 1.25 MG/3ML
1.25 SOLUTION RESPIRATORY (INHALATION) EVERY 6 HOURS PRN
Qty: 90 ML | Refills: 11 | Status: SHIPPED | OUTPATIENT
Start: 2023-10-26 | End: 2024-10-25

## 2023-10-26 RX ORDER — CARVEDILOL 25 MG/1
25 TABLET ORAL 2 TIMES DAILY
Qty: 60 TABLET | Refills: 2 | Status: ON HOLD | OUTPATIENT
Start: 2023-10-26 | End: 2024-01-09 | Stop reason: SDUPTHER

## 2023-10-26 RX ORDER — FUROSEMIDE 40 MG/1
40 TABLET ORAL DAILY
Qty: 30 TABLET | Refills: 2 | Status: SHIPPED | OUTPATIENT
Start: 2023-10-26 | End: 2024-01-09 | Stop reason: HOSPADM

## 2023-10-26 RX ORDER — VALSARTAN 320 MG/1
320 TABLET ORAL DAILY
Qty: 90 TABLET | Refills: 3 | Status: ON HOLD | OUTPATIENT
Start: 2023-10-26 | End: 2024-01-09 | Stop reason: SDUPTHER

## 2023-10-26 RX ORDER — HYDROXYZINE HYDROCHLORIDE 10 MG/1
10 TABLET, FILM COATED ORAL EVERY 6 HOURS PRN
Qty: 30 TABLET | Refills: 1 | Status: SHIPPED | OUTPATIENT
Start: 2023-10-26

## 2023-10-26 RX ORDER — GABAPENTIN 300 MG/1
300 CAPSULE ORAL NIGHTLY
Qty: 30 CAPSULE | Refills: 2 | Status: ON HOLD | OUTPATIENT
Start: 2023-10-26 | End: 2024-01-09 | Stop reason: SDUPTHER

## 2023-10-26 RX ORDER — AMLODIPINE BESYLATE 10 MG/1
TABLET ORAL
Qty: 30 TABLET | Refills: 2 | Status: ON HOLD | OUTPATIENT
Start: 2023-10-26 | End: 2024-01-09 | Stop reason: SDUPTHER

## 2023-10-26 ASSESSMENT — PAIN SCALES - GENERAL: PAINLEVEL: 0-NO PAIN

## 2023-10-26 NOTE — PROGRESS NOTES
Subjective   Patient ID: Stacey Heath is a 53 y.o. female who presents for Follow-up.    HPI  Recent hospitalization for malfunctioning AVF and PNA. S/p treatment w/ abx. Presenting w/ HTN and persistent nausea    #HTN  - /72 on repeat   - MWF HD  - monitor salt intake  - Plan to increased Hydralazine back to 100mg q8h    #nausea  #vomiting  - tolerating chicken broth  - EGD 10/5/23 shows small hiatal hernia, Erosive gastropathy  - Was on PPI 40mg every day, Carafate 1g BID, and reglan helps 5mg    #asthma  - albuterol BID since nebulizer stopped working  - Advair 100-50 as controller  - nebuluzer machine needed and solution    #itching  - 2-3 weeks itching  - refill atarax 10mg prn     Review of Systems  As per hpi  Previous history  Past Medical History:   Diagnosis Date    Breast pain 05/18/2021    History of breast pain    Dependence on renal dialysis (CMS/[facility]) 11/21/2022    Hemodialysis patient    Dry eye syndrome of bilateral lacrimal glands 03/07/2017    Dry eyes    Essential (primary) hypertension 12/27/2022    Hypertension    History of acute pancreatitis 12/21/2020    History of acute pancreatitis    Migraines     History of migraine    Other conditions influencing health status 09/01/2021    History of nephrostomy    Unspecified diastolic (congestive) heart failure (CMS/HCC) 11/21/2022    Heart failure with preserved left ventricular function (HFpEF)    Vaginal dryness 07/29/2022    Vaginal dryness     Past Surgical History:   Procedure Laterality Date    APPENDECTOMY  03/07/2017    Appendectomy    CT ABDOMEN ANGIOGRAM W AND/OR WO IV CONTRAST  4/23/2023    CT ABDOMEN ANGIOGRAM W AND/OR WO IV CONTRAST [facility]    IR VENOGRAM DIALYSIS  8/5/2021    IR VENOGRAM DIALYSIS 8/5/2021    OTHER SURGICAL HISTORY  03/07/2017    Cystoscopy With Pyeloscopy With Removal Of Calculus    OTHER SURGICAL HISTORY  03/07/2017    Anoscopy For Polyp Removal    OTHER SURGICAL HISTORY  12/08/2021    Arteriovenous fistula  creation procedure    OTHER SURGICAL HISTORY  12/08/2021    Dialysis tunneled catheter placement    TUBAL LIGATION  03/07/2017    Tubal Ligation     Social History     Tobacco Use    Smoking status: Never    Smokeless tobacco: Never   Substance Use Topics    Alcohol use: Never    Drug use: Never     Family History   Problem Relation Name Age of Onset    Other (Cerebrovascular Accident) Father      Heart failure Paternal Grandmother      Breast cancer Paternal Grandmother      Other (Primary Cervical Cancer) Paternal Grandmother      Diabetes Paternal Grandfather       Allergies   Allergen Reactions    Codeine Unknown and Itching    Iodine Hives and Unknown    Shellfish Containing Products Swelling     SEAFOOD     Current Outpatient Medications   Medication Instructions    albuterol 1.25 mg, nebulization, Every 6 hours PRN    amLODIPine (Norvasc) 10 mg tablet TAKE 1 TABLET BY MOUTH ONCE DAILY BEFORE DIALYSIS    atorvastatin (LIPITOR) 80 mg, oral, Nightly    carvedilol (COREG) 25 mg, oral, 2 times daily    cloNIDine (Catapres) 0.1 mg tablet TAKE 1 TABLET BY MOUTH THREE TIMES A DAY    doxazosin (Cardura) 2 mg tablet TAKE 1 TABLET BY MOUTH TWO TIMES A DAY    fluticasone propion-salmeteroL (Advair Diskus) 100-50 mcg/dose diskus inhaler 1 puff, inhalation, Every 12 hours    furosemide (LASIX) 40 mg, oral, Daily    gabapentin (NEURONTIN) 300 mg, oral, Nightly    hydrALAZINE (Apresoline) 100 mg tablet TAKE 1 TABLET BY MOUTH EVERY 8 HOURS    hydrOXYzine HCL (ATARAX) 10 mg, oral, Every 6 hours PRN    metoclopramide (Reglan) 5 mg tablet TAKE 1 TABLET BY MOUTH EVERY 6 HOURS AS NEEDED    multivitamin tablet 1 tablet, oral, Daily    multivitamin tablet TAKE 1 TABLET BY MOUTH ONCE DAILY    ondansetron (ZOFRAN) 4 mg, oral, Every 8 hours PRN    polyethylene glycol (Glycolax, Miralax) 17 gram/dose powder MIX ONE CAPFUL (17 GRAMS) IN 8 OUNCES OF LIQUID AND DRINK BY MOUTH ONCE DAILY    polyethylene glycol (GLYCOLAX, MIRALAX) 17 g,  oral, 2 times daily    SUMAtriptan (Imitrex) 50 mg tablet TAKE 1 TABLET BY MOUTH FOR MIGRAINE. MAY REPEAT EVERY 2 HOURS. MAX 200MG/DAY.    SUMAtriptan (IMITREX) 50 mg, oral, Once as needed, MAY REPEAT EVERY 2 HOURS. MAX 200MG/DAY.    valsartan (Diovan) 320 mg tablet TAKE 1 TABLET BY MOUTH ONCE DAILY       Objective       Physical Exam  HENT:      Head: Normocephalic and atraumatic.   Eyes:      General: No scleral icterus.     Conjunctiva/sclera: Conjunctivae normal.   Cardiovascular:      Rate and Rhythm: Normal rate and regular rhythm.      Heart sounds: No murmur heard.     No friction rub. No gallop.   Pulmonary:      Effort: Pulmonary effort is normal. No respiratory distress.      Breath sounds: Normal breath sounds.   Abdominal:      General: There is no distension.      Palpations: Abdomen is soft.      Tenderness: There is no abdominal tenderness.   Musculoskeletal:         General: Normal range of motion.   Skin:     General: Skin is warm and dry.      Comments: RUE fistula   Neurological:      General: No focal deficit present.      Mental Status: She is alert and oriented to person, place, and time.   Psychiatric:         Mood and Affect: Mood normal.       Assessment/Plan   Stacey Heath is a 53 y.o. female who presents for the concerns below:    Problem List Items Addressed This Visit       Hypertension - Primary    Relevant Medications    furosemide (Lasix) 40 mg tablet    carvedilol (Coreg) 25 mg tablet    amLODIPine (Norvasc) 10 mg tablet    valsartan (Diovan) 320 mg tablet     Other Visit Diagnoses       Moderate persistent asthma, unspecified whether complicated        Relevant Medications    fluticasone propion-salmeteroL (Advair Diskus) 100-50 mcg/dose diskus inhaler    albuterol 1.25 mg/3 mL nebulizer solution    Hyperlipidemia, unspecified hyperlipidemia type        Relevant Medications    atorvastatin (Lipitor) 80 mg tablet    Diabetic polyneuropathy associated with type 2 diabetes mellitus  (CMS/HCC)        Relevant Medications    gabapentin (Neurontin) 300 mg capsule    Itching        Relevant Medications    hydrOXYzine HCL (Atarax) 10 mg tablet           #HTN  #HLD  - /72 on repeat   - MWF HD  - monitor salt intake  - Plan to increased Hydralazine back to 100mg q8h  - refill lasix, coreg, amlodipine, valsartan  - refill lipitor 80mg     #nausea  #vomiting  - Order  PPI 40mg every day, Carafate 1g BID, and reglan helps 5mg    #asthma  - Refill albuterol and Adviar  - Ordered nebulizer machine needed and solution    #itching  - 2-3 weeks itching  - refill atarax 10mg prn     Discussed with:  Dr. Motley   Return in : 1 month    Portions of this note were generated using digital voice recognition software, and may contain grammatical errors       Carroll Medina, DO  PGY-2, Family Medicine

## 2023-10-27 NOTE — PROGRESS NOTES
and Roberta Gallego RN, met with patient at her home to complete biopsychosocial assessment for enrollment in [facility].    Patient agrees to continue with program.    Mayter to follow up later this month.

## 2023-11-01 NOTE — PROGRESS NOTES
I saw and evaluated the patient. I personally obtained the key and critical portions of the history and physical exam or was physically present for key and critical portions performed by the resident/fellow. I reviewed the resident/fellow's documentation and discussed the patient with the resident/fellow. I agree with the resident/fellow's medical decision making as documented in the note.    Geovany Motley MD

## 2023-11-02 ENCOUNTER — TELEPHONE (OUTPATIENT)
Dept: BEHAVIORAL HEALTH | Facility: CLINIC | Age: 53
End: 2023-11-02
Payer: COMMERCIAL

## 2023-11-14 ENCOUNTER — DOCUMENTATION (OUTPATIENT)
Dept: BEHAVIORAL HEALTH | Facility: CLINIC | Age: 53
End: 2023-11-14
Payer: COMMERCIAL

## 2023-11-14 ENCOUNTER — TELEPHONE (OUTPATIENT)
Dept: BEHAVIORAL HEALTH | Facility: CLINIC | Age: 53
End: 2023-11-14
Payer: COMMERCIAL

## 2023-11-14 NOTE — PROGRESS NOTES
Stacey Heath  Age: 53 y.o.  MRN: [id number]  Date: 11/14/2023  Location of service: in office    Program Details  Medicaid Community Clinical Case Management  Status: Enrolled  Effective Dates: 10/17/2023 - present  Responsible Staff: PORFIRIO Martínez      Goals Reviewed:      Summary:  This writer spent time looking at places to live for the patient, this writer sent patient options that seemed to fit her guidelines.                  Roberta Gallego RN

## 2023-11-14 NOTE — PROGRESS NOTES
Stacey Heath  Age: 53 y.o.  MRN: [id number]  Date: 11/14/2023  Location of service: in community    Program Details  Medicaid Community Clinical Case Management  Status: Enrolled  Effective Dates: 10/17/2023 - present  Responsible Staff: PORFIRIO Martínez      Goals Reviewed:      Summary: This writer and Manuelito MONROY met with patient in her home.  - Patient and fiance wanted to sign a month-to-month at their apartment in order to slowly look for a new place to live, however month-to-month is almost $300 more then they currently pay and they are unable to financially do it. They have been frantically looking for a new place to live before the 1st of the month. They have looked at several places, but none have been safe. This writer will look into places and send them to patient.  - Pt is unable to eat right now because of the taste of food, unable to hold anything down except broth. Pt states she is attempting to eat some vegetables in the broth. She states her kidney doctor states she should go to the ED if this persists after dialysis tomorrow.  - Several weeks ago it was confirmed over the phone that patient would be switched to [employer], but she did not receive anything in the mail or over the phone. We called [facility] and they state it did not go through. We spent a significant amount of time on hold and on the phone with [facility] and the [place] [employer] Medicaid in order to find out why this had occurred. They state she was inappropriately coded in their system and they will fix it within the next 4-7 days and will receive notification when it has been done.  - Pt discussed cooking as one of her coping skills.               Roberta Gallego RN

## 2023-11-24 ENCOUNTER — DOCUMENTATION (OUTPATIENT)
Dept: OCCUPATIONAL THERAPY | Facility: HOSPITAL | Age: 53
End: 2023-11-24
Payer: COMMERCIAL

## 2023-11-24 NOTE — PROGRESS NOTES
Discharge Summary    Name: Stacey Heath  MRN: [id number]  : 1970  Date: 23    Discharge Summary: OT    Discharge Information: Date of last visit , Date of evaluation , Number of attended visits 1, Referred by Dr. Buck, and Referred for bilateral shoulder pain    Therapy Summary: evaluation and home program    Discharge Status: unknown     Rehab Discharge Reason: Failed to schedule and/or keep follow-up appointment(s)

## 2023-11-27 ENCOUNTER — TELEPHONE (OUTPATIENT)
Dept: BEHAVIORAL HEALTH | Facility: CLINIC | Age: 53
End: 2023-11-27
Payer: COMMERCIAL

## 2023-11-29 ENCOUNTER — DOCUMENTATION (OUTPATIENT)
Dept: BEHAVIORAL HEALTH | Facility: CLINIC | Age: 53
End: 2023-11-29
Payer: COMMERCIAL

## 2023-11-29 ENCOUNTER — TELEPHONE (OUTPATIENT)
Dept: NEPHROLOGY | Facility: HOSPITAL | Age: 53
End: 2023-11-29

## 2023-11-29 NOTE — PROGRESS NOTES
Stacey Heath  Age: 53 y.o.  MRN: [id number]  Date: 11/29/2023  Location of service: phone call    Program Details  Medicaid Community Clinical Case Management  Status: Enrolled  Effective Dates: 10/17/2023 - present  Responsible Staff: PORFIRIO Martínez      Goals Reviewed:      Summary:  This writer called patient in order to set up a virtual visit for today's appointment. Pt initially agreed to this, however she called again and stated she will be unable to meet because her transportation for dialysis showed up earlier than expected.                 Roberta Gallego RN

## 2023-11-30 ENCOUNTER — DOCUMENTATION (OUTPATIENT)
Dept: BEHAVIORAL HEALTH | Facility: CLINIC | Age: 53
End: 2023-11-30
Payer: COMMERCIAL

## 2023-11-30 NOTE — PROGRESS NOTES
Stacey Heath  Age: 53 y.o.  MRN: [id number]  Date: 11/30/2023  Location of service: phone call    Program Details  Medicaid Community Clinical Case Management  Status: Enrolled  Effective Dates: 10/17/2023 - present  Responsible Staff: PORFIRIO Valderrama      Goals Reviewed:      Summary: This writer spoke with patient over the phone.    - Patient shares that she is stressed out about the move and stressed out because the furnace just broke at the new house.   - She states she new house is month to month rent so they are able to continue looking for a better place to live.  - Patient states they are officially out of the old house starting tomorrow and states everything has been moved to the new place.  - Patient shares she has not gotten a call or letter from [facility] and states she believes one of her insurances has lapsed, she believes it is Medicare.  - Patient states her BP has been elevated when it's been checked at dialysis. She states that the other day it was 230/112 and she was sent to the ED. She states they lowered her BP and then discharged her. She states that she and her dialysis doctor were both shocked that she wasn't admitted. Patient has an upcoming appointment with her  PCP on 12/11.  - Patient plans on switching her PCP from  to [facility], but wants to remain with nephrology at .  - Patient states she really wants to be able to get on a transplant list, but is frustrated that they make it so difficult.  - Patient is making dinner for her family tonight.               Roberta Gallego RN

## 2023-12-05 ENCOUNTER — DOCUMENTATION (OUTPATIENT)
Dept: BEHAVIORAL HEALTH | Facility: CLINIC | Age: 53
End: 2023-12-05
Payer: COMMERCIAL

## 2023-12-06 ENCOUNTER — DOCUMENTATION (OUTPATIENT)
Dept: BEHAVIORAL HEALTH | Facility: CLINIC | Age: 53
End: 2023-12-06
Payer: COMMERCIAL

## 2023-12-07 ENCOUNTER — DOCUMENTATION (OUTPATIENT)
Dept: BEHAVIORAL HEALTH | Facility: CLINIC | Age: 53
End: 2023-12-07
Payer: COMMERCIAL

## 2023-12-07 NOTE — PROGRESS NOTES
Stacey Heath  Age: 53 y.o.  MRN: [id number]  Date: 12/6/2023  Location of service: in community    Program Details  Medicaid Community Clinical Case Management  Status: Enrolled  Effective Dates: 10/17/2023 - present  Responsible Staff: PORFIRIO Valderrama      Goals Reviewed:      Summary: This writer met with patient at [facility]. Patient was in dialysis at the time.    - Patient was told by her dialysis clinic that she lost her Medicare. This writer and patient called Medicare to see if/why patient has lost her Medicare. They state she is still enrolled through [employer] plan. They state patient is still enrolled through [employer].  - This writer and patient called [employer] to ensure there is no problem on their end with her Medicare. They state she is still enrolled and there is no issue.  - This writer called Medicaid to make sure her Medicaid is still active. We were on hold for 20 minutes and were unable to stay on the phone.   - We attempted to use Benefits.gov to see if she is still enrolled in Medicaid, we were unable to find out on the website. Patient states she will call Medicaid on her own.  - Patient states the furnace is still out in their new place and they are trying to get the landlord to fix it. They may end up looking for a new place, however they do not want to and feel they will have trouble finding another place that does month to month rent so they can look for a better place.               Roberta Gallego RN

## 2023-12-12 ENCOUNTER — DOCUMENTATION (OUTPATIENT)
Dept: BEHAVIORAL HEALTH | Facility: CLINIC | Age: 53
End: 2023-12-12
Payer: COMMERCIAL

## 2023-12-12 ENCOUNTER — APPOINTMENT (OUTPATIENT)
Dept: BEHAVIORAL HEALTH | Facility: CLINIC | Age: 53
End: 2023-12-12
Payer: COMMERCIAL

## 2023-12-12 NOTE — PROGRESS NOTES
Stacey Heath  Age: 53 y.o.  MRN: [id number]  Date: 12/12/2023  Location of service: phone call    Program Details  Medicaid Community Clinical Case Management  Status: Enrolled  Effective Dates: 10/17/2023 - present  Responsible Staff: PORFIRIO Valderrama      Goals Reviewed:      Summary:  This writer and Dora MONROY called patient while we waited outside what we thought was her door. Patient did not answer the phone at this time. This writer left voicemail. It appears we may have the wrong address for her, as we could not find [address] [place] [address].    Appointment start time: 1115  Appointment completion time: 1117  Total time spent with patient (in minutes): 2      Roberta Gallego RN

## 2023-12-12 NOTE — PROGRESS NOTES
Stacey Heath  Age: 53 y.o.  MRN: [id number]  Date: 12/12/2023  Location of service: phone call    Program Details  Medicaid Community Clinical Case Management  Status: Enrolled  Effective Dates: 10/17/2023 - present  Responsible Staff: PORFIRIO Valderrama      Goals Reviewed:      Summary:  This writer called patient to confirm where we are meeting today, because patient said last week that she may be staying with her daughter this week. Patient did not answer at this time. This writer left a voicemail and will call back at a later time.    Appointment start time: 0935  Appointment completion time: 0936  Total time spent with patient (in minutes): 1      Roberta Gallego RN

## 2023-12-12 NOTE — PROGRESS NOTES
Stacey Heath  Age: 53 y.o.  MRN: [id number]  Date: 12/12/2023  Location of service: phone call    Program Details  Medicaid Community Clinical Case Management  Status: Enrolled  Effective Dates: 10/17/2023 - present  Responsible Staff: PORFIRIO Valderrama      Goals Reviewed:      Summary:  This writer called patient again to confirm appt location. Patient did not answer at this time. This writer left voicemail stating if she did not call back before 10:30 that this writer and Dora MONROY would come to [address] [place] [address] because it is her new address and the only address we have available for her.    Appointment start time: 1014  Appointment completion time: 1015  Total time spent with patient (in minutes): 1      Roberta Gallego RN

## 2023-12-13 ENCOUNTER — DOCUMENTATION (OUTPATIENT)
Dept: BEHAVIORAL HEALTH | Facility: CLINIC | Age: 53
End: 2023-12-13
Payer: COMMERCIAL

## 2023-12-13 NOTE — PROGRESS NOTES
Stacey Heath  Age: 53 y.o.  MRN: [id number]  Date: 12/13/2023  Location of service: phone call    Program Details  Medicaid Community Clinical Case Management  Status: Enrolled  Effective Dates: 10/17/2023 - present  Responsible Staff: PORFIRIO Valderrama      Goals Reviewed:      Summary:  This writer called [facility] to get the location of the patient. Patient is currently in ICU.    Appointment start time: 1306  Appointment completion time: 1308  Total time spent with patient (in minutes): 2      Roberta Gallego RN

## 2023-12-13 NOTE — PROGRESS NOTES
Stacey Heath  Age: 53 y.o.  MRN: [id number]  Date: 12/13/2023  Location of service: phone call    Program Details  Medicaid Community Clinical Case Management  Status: Enrolled  Effective Dates: 10/17/2023 - present  Responsible Staff: PORFIRIO Valderrama      Goals Reviewed:      Summary:  This writer called patient to check in and see how she is doing. Patient did not answer, this writer left a voicemail.     Appointment start time: 1303  Appointment completion time: 1304  Total time spent with patient (in minutes): 1      Roberta Gallego RN

## 2023-12-13 NOTE — PROGRESS NOTES
Stacey Heath  Age: 53 y.o.  MRN: [id number]  Date: 12/13/2023  Location of service: phone call    Program Details  Medicaid Community Clinical Case Management  Status: Enrolled  Effective Dates: 10/17/2023 - present  Responsible Staff: PORFIRIO Valderrama      Goals Reviewed:      Summary:  This writer called patient to check in and see how she is doing after noting that patient was admitted to [facility] ED last night. Patient did not answer, this writer left a voicemail.    Appointment start time: 1011  Appointment completion time: 1012  Total time spent with patient (in minutes): 1      Roberta Gallego RN

## 2023-12-14 ENCOUNTER — DOCUMENTATION (OUTPATIENT)
Dept: BEHAVIORAL HEALTH | Facility: CLINIC | Age: 53
End: 2023-12-14
Payer: COMMERCIAL

## 2023-12-14 NOTE — PROGRESS NOTES
Stacey Heath  Age: 53 y.o.  MRN: [id number]  Date: 12/13/2023  Location of service: in community    Program Details  Medicaid Community Clinical Case Management  Status: Enrolled  Effective Dates: 10/17/2023 - present  Responsible Staff: PORFIRIO Valderrama      Goals Reviewed:      Summary: This writer met with patient in the [facility] ICU.    Patient missed dialysis Monday because car wouldn't start.  Patient had dialysis at 1am and 8am today, got sick the second time, per patient and nurse.  Patient's BP has been high, per patient and nurse.  Patient states the only reason she is in ICU is because that was the quickest way to get her dialysis  Patient eating when this writer arrived.  No discharge plan yet.  Patient wants to find dialysis closer to her house - [facility]  This writer called [facility] to transfer care from [place] to [facility]. After speaking with them for a lengthy amount of time, they could not transfer patient to [facility] location at this time without sending medical records from [facility], which I am unable to do. Will attempt to call at another time.    Appointment start time: 1342  Appointment completion time: 1446  Total time spent with patient (in minutes): 64      Roberta Galleog RN

## 2023-12-14 NOTE — PROGRESS NOTES
Stacey Heath  Age: 53 y.o.  MRN: [id number]  Date: 12/14/2023  Location of service: phone call    Program Details  Medicaid Community Clinical Case Management  Status: Enrolled  Effective Dates: 10/17/2023 - present  Responsible Staff: PORFIRIO Valderrama      Goals Reviewed:      Summary:  This writer called [facility] in [place] to ask about the process for transferring a patient between facilities. They transferred me to another number where this writer left a voicemail.    Appointment start time: 0928  Appointment completion time: 0931  Total time spent with patient (in minutes): 3      Roberta Gallego RN

## 2023-12-14 NOTE — PROGRESS NOTES
Stacey Heath  Age: 53 y.o.  MRN: [id number]  Date: 12/14/2023  Location of service: phone call    Program Details  Medicaid Community Clinical Case Management  Status: Enrolled  Effective Dates: 10/17/2023 - present  Responsible Staff: PORFIRIO Valderrama      Goals Reviewed:      Summary:  This writer called patient to update her on my phone call with [facility]. Patient did not answer at this time. This writer left voicemail.    Appointment start time: 1258  Appointment completion time: 1259  Total time spent with patient (in minutes): 1      Roberta Gallego RN

## 2023-12-19 ENCOUNTER — APPOINTMENT (OUTPATIENT)
Dept: BEHAVIORAL HEALTH | Facility: CLINIC | Age: 53
End: 2023-12-19
Payer: COMMERCIAL

## 2023-12-19 ENCOUNTER — DOCUMENTATION (OUTPATIENT)
Dept: BEHAVIORAL HEALTH | Facility: CLINIC | Age: 53
End: 2023-12-19
Payer: COMMERCIAL

## 2023-12-19 DIAGNOSIS — F41.9 ANXIETY: ICD-10-CM

## 2023-12-19 PROCEDURE — H2019 THER BEHAV SVC, PER 15 MIN: HCPCS | Mod: AJ,HE | Performed by: SOCIAL WORKER

## 2023-12-19 NOTE — PROGRESS NOTES
"Stacey Heath  Age: 53 y.o.  MRN: [id number]  Date: 12/19/2023  Location of service: in community    Program Details  Medicaid Community Clinical Case Management  Status: Enrolled  Effective Dates: 10/17/2023 - present  Responsible Staff: PORFIRIO Valderrama      Goals Reviewed:  Problem: Anxiety       Goal: Attempts to manage anxiety with help       Priority: High         Goal: Verbalizes ways to manage anxiety       Priority: High         Goal: Implements measures to reduce anxiety       Priority: High        Problem: Financial Stressors       Goal: Assistance with financial concerns         Problem: Kidney Issues       Goal: Improve health to get on kidney transplant list       Priority: High        Problem: Negative Experience, Conflict with, or Distrust of Providers and/or Health System       Goal: Plan to Address Patient Specific Negative Experience, Distrust, or Conflict with Providers and/or Health System       Priority: High        Problem: Risk of Uncoordinated Care       Goal: Care will be Coordinated and Supported by a Multidisciplinary Team of Providers       Priority: High          Summary: This writer and Dora MONROY met with patient at her home.    Dora MONROY completed patient's care plan with this writer present. Patient is agreeable to all goals set.  Patient's heat was fixed by her landlord, but is now dealing with other issues around the house. Has a plan to give landlord a letter and \"deal with him downtown.\"  Patient has Sumatriptan for migraines, last time she took it was about a year ago and states she hasn't had any migraines.  Patient states she has been feeling short of breath in the cold weather and had a bad experience at dialysis recently. States she hasn't put on any weight and is back to not being able to eat right now. States everything tastes like plywood, or she eats and then feels she needs to vomit. She states she has been doing a lot of drinking, but " "knows she can't do too much because of potential fluid build-up. Patient states she is going to try to drink broth later and see how she feels.   Patient discussed, at length, her relationship with family members.   Patient shares that her and her efraín are trying to get custody of her efraín's nephew, after his father committed suicide. He is currently staying with them full-time, this is a trial period of 6 months to make sure that he can stay with them permanently.   Patient states they have wanted to take this nephew in for a long time, right after his father harmed himself for the first time.  Patient states the nephew is only 14 years old and is on a significant amount of medications, they look forward to taking him to a doctor to see if he needs to be on all of those medications.   This writer and patient called Medicaid to see why she has not been switched to [employer]. They state she needs to have her \"code\" switched over, they instruct us to call [facility] and state they will know what the code is.   This writer and patient call [facility], after getting through all of the prompts, they are unable to take calls at this time. This writer and patient will go down to [facility] in person next Tues 12/26/23.     Appointment start time: 1235  Appointment completion time: 1435  Total time spent with patient (in minutes): 120      Roberta Gallego RN    "

## 2023-12-20 NOTE — CARE PLAN
Stacey Heath  Age: 53 y.o.  MRN: [id number]  Date: 12/20/2023  Location of service: in community    Program Details  Medicaid Community Clinical Case Management  Status: Enrolled  Effective Dates: 10/17/2023 - present  Responsible Staff: PORFIRIO Valderrama      Goals Reviewed:  Problem: Anxiety       Goal: Attempts to manage anxiety with help       Priority: High         Goal: Verbalizes ways to manage anxiety       Priority: High         Goal: Implements measures to reduce anxiety       Priority: High        Problem: Financial Stressors       Goal: Assistance with financial concerns         Problem: Kidney Issues       Goal: Improve health to get on kidney transplant list       Priority: High        Problem: Negative Experience, Conflict with, or Distrust of Providers and/or Health System       Goal: Plan to Address Patient Specific Negative Experience, Distrust, or Conflict with Providers and/or Health System       Priority: High        Problem: Risk of Uncoordinated Care       Goal: Care will be Coordinated and Supported by a Multidisciplinary Team of Providers       Priority: High          Summary:  Provider     Appointment start time: 1230  Appointment completion time: 1330  Total time spent with patient (in minutes): 60      PORFIRIO Valderrama

## 2023-12-21 NOTE — CARE PLAN
Stacey Heath  Age: 53 y.o.  MRN: [id number]  Date: 12/21/2023  Location of service: in community    Program Details  Medicaid Community Clinical Case Management  Status: Enrolled  Effective Dates: 10/17/2023 - present  Responsible Staff: PORFIRIO Valderrama      Goals Reviewed:  Problem: Anxiety       Goal: Attempts to manage anxiety with help       Priority: High         Goal: Verbalizes ways to manage anxiety       Priority: High         Goal: Implements measures to reduce anxiety       Priority: High        Problem: Financial Stressors       Goal: Assistance with financial concerns         Problem: Kidney Issues       Goal: Improve health to get on kidney transplant list       Priority: High        Problem: Negative Experience, Conflict with, or Distrust of Providers and/or Health System       Goal: Plan to Address Patient Specific Negative Experience, Distrust, or Conflict with Providers and/or Health System       Priority: High        Problem: Risk of Uncoordinated Care       Goal: Care will be Coordinated and Supported by a Multidisciplinary Team of Providers       Priority: High          Summary:  Provider met with patient and other CM Roberta Gallego at patient's home. Provider, patient, and other CM created a care plan to discuss goals patient would like to work on in case management program. Provider, patient, and other CM discussed both behavioral health goals and nursing goals. Patient agreed to several different goals and interventions for those goals.     Appointment start time: 1230  Appointment completion time: 1330  Total time spent with patient (in minutes): 60      PORFIRIO Valderrama

## 2023-12-22 ENCOUNTER — TELEPHONE (OUTPATIENT)
Dept: BEHAVIORAL HEALTH | Facility: CLINIC | Age: 53
End: 2023-12-22
Payer: COMMERCIAL

## 2023-12-26 ENCOUNTER — DOCUMENTATION (OUTPATIENT)
Dept: BEHAVIORAL HEALTH | Facility: CLINIC | Age: 53
End: 2023-12-26
Payer: COMMERCIAL

## 2023-12-26 NOTE — PROGRESS NOTES
Stacey Heath  Age: 53 y.o.  MRN: [id number]  Date: 12/26/2023  Location of service: phone call    Program Details  Medicaid Community Clinical Case Management  Status: Enrolled  Effective Dates: 10/17/2023 - present  Responsible Staff: PORFIRIO Valderrama      Goals Reviewed:  Problem: Financial Stressors       Goal: Assistance with financial concerns           Summary:  This writer called patient to inform her that this writer will be unable to accompany her to [facility] today d/t this writer feeling unwell. Patient is encouraged to go to [facility] on her own to request a new Medicaid code, per the instructions from the Medicaid office, and call this writer if need be when she is speaking with a representative. Patient is agreeable to this.    Appointment start time: 0858  Appointment completion time: 0901  Total time spent with patient (in minutes): 3      Roberta Gallego RN

## 2023-12-28 ENCOUNTER — APPOINTMENT (OUTPATIENT)
Dept: BEHAVIORAL HEALTH | Facility: CLINIC | Age: 53
End: 2023-12-28
Payer: COMMERCIAL

## 2023-12-28 ENCOUNTER — DOCUMENTATION (OUTPATIENT)
Dept: BEHAVIORAL HEALTH | Facility: CLINIC | Age: 53
End: 2023-12-28
Payer: COMMERCIAL

## 2023-12-28 DIAGNOSIS — F41.9 ANXIETY: ICD-10-CM

## 2023-12-28 PROCEDURE — H2019 THER BEHAV SVC, PER 15 MIN: HCPCS | Mod: AJ,HE | Performed by: SOCIAL WORKER

## 2023-12-29 ENCOUNTER — DOCUMENTATION (OUTPATIENT)
Dept: BEHAVIORAL HEALTH | Facility: CLINIC | Age: 53
End: 2023-12-29
Payer: COMMERCIAL

## 2023-12-29 NOTE — PROGRESS NOTES
Stacey Heath  Age: 53 y.o.  MRN: [id number]  Date: 12/29/2023  Location of service: phone call    Program Details  Medicaid Community Clinical Case Management  Status: Enrolled  Effective Dates: 10/17/2023 - present  Responsible Staff: PORFIRIO Valderrama      Goals Reviewed:  Problem: Financial Stressors       Goal: Assistance with financial concerns           Summary:  This writer called patient to schedule an appointment to meet her at [facility] because she was unable to get the answers she needed when she went by herself. We scheduled appointment for Friday 1/5/24.    Appointment start time: 1414  Appointment completion time: 1420  Total time spent with patient (in minutes): 6      Roberta Gallego RN

## 2024-01-02 ENCOUNTER — DOCUMENTATION (OUTPATIENT)
Dept: BEHAVIORAL HEALTH | Facility: CLINIC | Age: 54
End: 2024-01-02
Payer: COMMERCIAL

## 2024-01-02 DIAGNOSIS — F41.9 ANXIETY: ICD-10-CM

## 2024-01-02 PROCEDURE — H2019 THER BEHAV SVC, PER 15 MIN: HCPCS | Mod: AJ,HE | Performed by: SOCIAL WORKER

## 2024-01-02 NOTE — PROGRESS NOTES
"Stacey Heath  Age: 53 y.o.  MRN: [id number]  Date: 1/2/2024  Location of service: in community    Program Details  Medicaid Community Clinical Case Management  Status: Enrolled  Effective Dates: 10/17/2023 - present  Responsible Staff: PORFIRIO Valderrama      Goals Reviewed:  Problem: Anxiety       Goal: Attempts to manage anxiety with help       Priority: High         Goal: Verbalizes ways to manage anxiety       Priority: High         Goal: Implements measures to reduce anxiety       Priority: High        Problem: Negative Experience, Conflict with, or Distrust of Providers and/or Health System       Goal: Plan to Address Patient Specific Negative Experience, Distrust, or Conflict with Providers and/or Health System       Priority: High        Problem: Risk of Uncoordinated Care       Goal: Care will be Coordinated and Supported by a Multidisciplinary Team of Providers       Priority: High          Summary:  Provider and patient met at patient's home. Provider utilized empathic listeningto facilitate a discussion with patient about how she is currently doing. Patient reports that she is doing \"okay\"     Appointment start time: 1100  Appointment completion time: 1200  Total time spent with patient (in minutes): 60      PORFIRIO Valderrama   "

## 2024-01-04 ENCOUNTER — APPOINTMENT (OUTPATIENT)
Dept: CARDIOLOGY | Facility: HOSPITAL | Age: 54
DRG: 304 | End: 2024-01-04
Payer: COMMERCIAL

## 2024-01-04 ENCOUNTER — APPOINTMENT (OUTPATIENT)
Dept: RADIOLOGY | Facility: HOSPITAL | Age: 54
DRG: 304 | End: 2024-01-04
Payer: COMMERCIAL

## 2024-01-04 ENCOUNTER — APPOINTMENT (OUTPATIENT)
Dept: DIALYSIS | Facility: HOSPITAL | Age: 54
End: 2024-01-04
Payer: COMMERCIAL

## 2024-01-04 ENCOUNTER — DOCUMENTATION (OUTPATIENT)
Dept: BEHAVIORAL HEALTH | Facility: CLINIC | Age: 54
End: 2024-01-04

## 2024-01-04 ENCOUNTER — HOSPITAL ENCOUNTER (INPATIENT)
Facility: HOSPITAL | Age: 54
LOS: 5 days | Discharge: HOME | DRG: 304 | End: 2024-01-09
Attending: EMERGENCY MEDICINE | Admitting: INTERNAL MEDICINE
Payer: COMMERCIAL

## 2024-01-04 ENCOUNTER — CLINICAL SUPPORT (OUTPATIENT)
Dept: EMERGENCY MEDICINE | Facility: HOSPITAL | Age: 54
DRG: 304 | End: 2024-01-04
Payer: COMMERCIAL

## 2024-01-04 DIAGNOSIS — I10 HYPERTENSION, UNSPECIFIED TYPE: ICD-10-CM

## 2024-01-04 DIAGNOSIS — Z99.2 HEMODIALYSIS PATIENT (CMS-HCC): ICD-10-CM

## 2024-01-04 DIAGNOSIS — E78.5 HYPERLIPIDEMIA, UNSPECIFIED HYPERLIPIDEMIA TYPE: ICD-10-CM

## 2024-01-04 DIAGNOSIS — Z99.2 ESRD (END STAGE RENAL DISEASE) ON DIALYSIS (MULTI): Primary | ICD-10-CM

## 2024-01-04 DIAGNOSIS — Z94.9 TRANSPLANT: ICD-10-CM

## 2024-01-04 DIAGNOSIS — E11.42 DIABETIC POLYNEUROPATHY ASSOCIATED WITH TYPE 2 DIABETES MELLITUS: ICD-10-CM

## 2024-01-04 DIAGNOSIS — G43.119 INTRACTABLE MIGRAINE WITH AURA WITHOUT STATUS MIGRAINOSUS: ICD-10-CM

## 2024-01-04 DIAGNOSIS — I50.30 HEART FAILURE WITH PRESERVED LEFT VENTRICULAR FUNCTION (HFPEF): ICD-10-CM

## 2024-01-04 DIAGNOSIS — M25.512 BILATERAL SHOULDER PAIN, UNSPECIFIED CHRONICITY: ICD-10-CM

## 2024-01-04 DIAGNOSIS — G62.9 NEUROPATHY: ICD-10-CM

## 2024-01-04 DIAGNOSIS — K29.70 GASTRITIS WITHOUT BLEEDING, UNSPECIFIED CHRONICITY, UNSPECIFIED GASTRITIS TYPE: ICD-10-CM

## 2024-01-04 DIAGNOSIS — J45.40 MODERATE PERSISTENT ASTHMA, UNSPECIFIED WHETHER COMPLICATED (HHS-HCC): ICD-10-CM

## 2024-01-04 DIAGNOSIS — E11.9 TYPE 2 DIABETES MELLITUS WITHOUT COMPLICATION, UNSPECIFIED WHETHER LONG TERM INSULIN USE: ICD-10-CM

## 2024-01-04 DIAGNOSIS — N18.6 ESRD (END STAGE RENAL DISEASE) ON DIALYSIS (MULTI): Primary | ICD-10-CM

## 2024-01-04 DIAGNOSIS — I10 PRIMARY HYPERTENSION: ICD-10-CM

## 2024-01-04 DIAGNOSIS — M25.511 BILATERAL SHOULDER PAIN, UNSPECIFIED CHRONICITY: ICD-10-CM

## 2024-01-04 DIAGNOSIS — R79.89 TROPONIN LEVEL ELEVATED: ICD-10-CM

## 2024-01-04 DIAGNOSIS — N89.8 VAGINAL DRYNESS: ICD-10-CM

## 2024-01-04 DIAGNOSIS — R11.2 NAUSEA AND VOMITING, UNSPECIFIED VOMITING TYPE: ICD-10-CM

## 2024-01-04 DIAGNOSIS — I16.1 HYPERTENSIVE EMERGENCY: ICD-10-CM

## 2024-01-04 LAB
ALBUMIN SERPL BCP-MCNC: 3.7 G/DL (ref 3.4–5)
ALP SERPL-CCNC: 124 U/L (ref 33–110)
ALT SERPL W P-5'-P-CCNC: 9 U/L (ref 7–45)
ANION GAP BLDV CALCULATED.4IONS-SCNC: 7 MMOL/L (ref 10–25)
ANION GAP SERPL CALC-SCNC: 16 MMOL/L (ref 10–20)
AST SERPL W P-5'-P-CCNC: 12 U/L (ref 9–39)
ATRIAL RATE: 95 BPM
ATRIAL RATE: 99 BPM
BASE EXCESS BLDV CALC-SCNC: 9.2 MMOL/L (ref -2–3)
BASOPHILS # BLD AUTO: 0.06 X10*3/UL (ref 0–0.1)
BASOPHILS NFR BLD AUTO: 1 %
BILIRUB SERPL-MCNC: 0.6 MG/DL (ref 0–1.2)
BNP SERPL-MCNC: 1470 PG/ML (ref 0–99)
BODY TEMPERATURE: 37 DEGREES CELSIUS
BUN SERPL-MCNC: 32 MG/DL (ref 6–23)
CA-I BLDV-SCNC: 1.21 MMOL/L (ref 1.1–1.33)
CALCIUM SERPL-MCNC: 9.4 MG/DL (ref 8.6–10.6)
CARDIAC TROPONIN I PNL SERPL HS: 38 NG/L (ref 0–34)
CARDIAC TROPONIN I PNL SERPL HS: 39 NG/L (ref 0–34)
CHLORIDE BLDV-SCNC: 105 MMOL/L (ref 98–107)
CHLORIDE SERPL-SCNC: 101 MMOL/L (ref 98–107)
CO2 SERPL-SCNC: 31 MMOL/L (ref 21–32)
CREAT SERPL-MCNC: 9.64 MG/DL (ref 0.5–1.05)
EOSINOPHIL # BLD AUTO: 0.32 X10*3/UL (ref 0–0.7)
EOSINOPHIL NFR BLD AUTO: 5.4 %
ERYTHROCYTE [DISTWIDTH] IN BLOOD BY AUTOMATED COUNT: 14.4 % (ref 11.5–14.5)
FLUAV RNA RESP QL NAA+PROBE: NOT DETECTED
FLUBV RNA RESP QL NAA+PROBE: NOT DETECTED
GFR SERPL CREATININE-BSD FRML MDRD: 4 ML/MIN/1.73M*2
GLUCOSE BLD MANUAL STRIP-MCNC: 160 MG/DL (ref 74–99)
GLUCOSE BLD MANUAL STRIP-MCNC: 188 MG/DL (ref 74–99)
GLUCOSE BLDV-MCNC: 109 MG/DL (ref 74–99)
GLUCOSE SERPL-MCNC: 95 MG/DL (ref 74–99)
HCO3 BLDV-SCNC: 34.1 MMOL/L (ref 22–26)
HCT VFR BLD AUTO: 25.1 % (ref 36–46)
HCT VFR BLD EST: 25 % (ref 36–46)
HGB BLD-MCNC: 8.3 G/DL (ref 12–16)
HGB BLDV-MCNC: 8.4 G/DL (ref 12–16)
IMM GRANULOCYTES # BLD AUTO: 0.03 X10*3/UL (ref 0–0.7)
IMM GRANULOCYTES NFR BLD AUTO: 0.5 % (ref 0–0.9)
INHALED O2 CONCENTRATION: 28 %
LACTATE BLDV-SCNC: 0.8 MMOL/L (ref 0.4–2)
LYMPHOCYTES # BLD AUTO: 0.76 X10*3/UL (ref 1.2–4.8)
LYMPHOCYTES NFR BLD AUTO: 12.8 %
MAGNESIUM SERPL-MCNC: 2.45 MG/DL (ref 1.6–2.4)
MCH RBC QN AUTO: 29.9 PG (ref 26–34)
MCHC RBC AUTO-ENTMCNC: 33.1 G/DL (ref 32–36)
MCV RBC AUTO: 90 FL (ref 80–100)
MONOCYTES # BLD AUTO: 0.39 X10*3/UL (ref 0.1–1)
MONOCYTES NFR BLD AUTO: 6.5 %
NEUTROPHILS # BLD AUTO: 4.4 X10*3/UL (ref 1.2–7.7)
NEUTROPHILS NFR BLD AUTO: 73.8 %
NRBC BLD-RTO: 0 /100 WBCS (ref 0–0)
OXYHGB MFR BLDV: 43.1 % (ref 45–75)
P AXIS: 48 DEGREES
P AXIS: 50 DEGREES
P OFFSET: 195 MS
P OFFSET: 196 MS
P ONSET: 145 MS
P ONSET: 146 MS
PCO2 BLDV: 48 MM HG (ref 41–51)
PH BLDV: 7.46 PH (ref 7.33–7.43)
PHOSPHATE SERPL-MCNC: 4.6 MG/DL (ref 2.5–4.9)
PLATELET # BLD AUTO: 182 X10*3/UL (ref 150–450)
PO2 BLDV: 40 MM HG (ref 35–45)
POTASSIUM BLDV-SCNC: 4 MMOL/L (ref 3.5–5.3)
POTASSIUM SERPL-SCNC: 4 MMOL/L (ref 3.5–5.3)
PR INTERVAL: 144 MS
PR INTERVAL: 146 MS
PROT SERPL-MCNC: 7.3 G/DL (ref 6.4–8.2)
Q ONSET: 217 MS
Q ONSET: 219 MS
QRS COUNT: 16 BEATS
QRS COUNT: 16 BEATS
QRS DURATION: 74 MS
QRS DURATION: 88 MS
QT INTERVAL: 362 MS
QT INTERVAL: 370 MS
QTC CALCULATION(BAZETT): 454 MS
QTC CALCULATION(BAZETT): 474 MS
QTC FREDERICIA: 421 MS
QTC FREDERICIA: 437 MS
R AXIS: 2 DEGREES
R AXIS: 4 DEGREES
RBC # BLD AUTO: 2.78 X10*6/UL (ref 4–5.2)
SAO2 % BLDV: 44 % (ref 45–75)
SARS-COV-2 RNA RESP QL NAA+PROBE: NOT DETECTED
SODIUM BLDV-SCNC: 142 MMOL/L (ref 136–145)
SODIUM SERPL-SCNC: 144 MMOL/L (ref 136–145)
T AXIS: 149 DEGREES
T AXIS: 149 DEGREES
T OFFSET: 398 MS
T OFFSET: 404 MS
VENTRICULAR RATE: 95 BPM
VENTRICULAR RATE: 99 BPM
WBC # BLD AUTO: 6 X10*3/UL (ref 4.4–11.3)

## 2024-01-04 PROCEDURE — 85025 COMPLETE CBC W/AUTO DIFF WBC: CPT | Performed by: STUDENT IN AN ORGANIZED HEALTH CARE EDUCATION/TRAINING PROGRAM

## 2024-01-04 PROCEDURE — 2500000004 HC RX 250 GENERAL PHARMACY W/ HCPCS (ALT 636 FOR OP/ED): Performed by: STUDENT IN AN ORGANIZED HEALTH CARE EDUCATION/TRAINING PROGRAM

## 2024-01-04 PROCEDURE — 83880 ASSAY OF NATRIURETIC PEPTIDE: CPT | Performed by: STUDENT IN AN ORGANIZED HEALTH CARE EDUCATION/TRAINING PROGRAM

## 2024-01-04 PROCEDURE — 96375 TX/PRO/DX INJ NEW DRUG ADDON: CPT | Mod: 59

## 2024-01-04 PROCEDURE — 36415 COLL VENOUS BLD VENIPUNCTURE: CPT | Performed by: STUDENT IN AN ORGANIZED HEALTH CARE EDUCATION/TRAINING PROGRAM

## 2024-01-04 PROCEDURE — 2500000002 HC RX 250 W HCPCS SELF ADMINISTERED DRUGS (ALT 637 FOR MEDICARE OP, ALT 636 FOR OP/ED): Performed by: STUDENT IN AN ORGANIZED HEALTH CARE EDUCATION/TRAINING PROGRAM

## 2024-01-04 PROCEDURE — 93005 ELECTROCARDIOGRAM TRACING: CPT

## 2024-01-04 PROCEDURE — 99221 1ST HOSP IP/OBS SF/LOW 40: CPT

## 2024-01-04 PROCEDURE — 71046 X-RAY EXAM CHEST 2 VIEWS: CPT

## 2024-01-04 PROCEDURE — 83735 ASSAY OF MAGNESIUM: CPT | Performed by: STUDENT IN AN ORGANIZED HEALTH CARE EDUCATION/TRAINING PROGRAM

## 2024-01-04 PROCEDURE — 99285 EMERGENCY DEPT VISIT HI MDM: CPT | Performed by: EMERGENCY MEDICINE

## 2024-01-04 PROCEDURE — 94640 AIRWAY INHALATION TREATMENT: CPT

## 2024-01-04 PROCEDURE — 96372 THER/PROPH/DIAG INJ SC/IM: CPT

## 2024-01-04 PROCEDURE — 99291 CRITICAL CARE FIRST HOUR: CPT

## 2024-01-04 PROCEDURE — 99285 EMERGENCY DEPT VISIT HI MDM: CPT | Mod: 25 | Performed by: EMERGENCY MEDICINE

## 2024-01-04 PROCEDURE — 2500000001 HC RX 250 WO HCPCS SELF ADMINISTERED DRUGS (ALT 637 FOR MEDICARE OP)

## 2024-01-04 PROCEDURE — 82947 ASSAY GLUCOSE BLOOD QUANT: CPT

## 2024-01-04 PROCEDURE — 2500000002 HC RX 250 W HCPCS SELF ADMINISTERED DRUGS (ALT 637 FOR MEDICARE OP, ALT 636 FOR OP/ED): Performed by: INTERNAL MEDICINE

## 2024-01-04 PROCEDURE — 76604 US EXAM CHEST: CPT | Performed by: STUDENT IN AN ORGANIZED HEALTH CARE EDUCATION/TRAINING PROGRAM

## 2024-01-04 PROCEDURE — 2500000004 HC RX 250 GENERAL PHARMACY W/ HCPCS (ALT 636 FOR OP/ED)

## 2024-01-04 PROCEDURE — 2020000001 HC ICU ROOM DAILY

## 2024-01-04 PROCEDURE — 96374 THER/PROPH/DIAG INJ IV PUSH: CPT | Mod: 59

## 2024-01-04 PROCEDURE — 93010 ELECTROCARDIOGRAM REPORT: CPT | Performed by: INTERNAL MEDICINE

## 2024-01-04 PROCEDURE — 8010000001 HC DIALYSIS - HEMODIALYSIS PER DAY

## 2024-01-04 PROCEDURE — 87636 SARSCOV2 & INF A&B AMP PRB: CPT | Performed by: STUDENT IN AN ORGANIZED HEALTH CARE EDUCATION/TRAINING PROGRAM

## 2024-01-04 PROCEDURE — 2500000002 HC RX 250 W HCPCS SELF ADMINISTERED DRUGS (ALT 637 FOR MEDICARE OP, ALT 636 FOR OP/ED): Performed by: EMERGENCY MEDICINE

## 2024-01-04 PROCEDURE — 84100 ASSAY OF PHOSPHORUS: CPT | Performed by: STUDENT IN AN ORGANIZED HEALTH CARE EDUCATION/TRAINING PROGRAM

## 2024-01-04 PROCEDURE — 71046 X-RAY EXAM CHEST 2 VIEWS: CPT | Performed by: RADIOLOGY

## 2024-01-04 PROCEDURE — 84484 ASSAY OF TROPONIN QUANT: CPT | Performed by: STUDENT IN AN ORGANIZED HEALTH CARE EDUCATION/TRAINING PROGRAM

## 2024-01-04 PROCEDURE — 93010 ELECTROCARDIOGRAM REPORT: CPT | Performed by: EMERGENCY MEDICINE

## 2024-01-04 PROCEDURE — 2500000001 HC RX 250 WO HCPCS SELF ADMINISTERED DRUGS (ALT 637 FOR MEDICARE OP): Performed by: STUDENT IN AN ORGANIZED HEALTH CARE EDUCATION/TRAINING PROGRAM

## 2024-01-04 PROCEDURE — 84075 ASSAY ALKALINE PHOSPHATASE: CPT | Performed by: STUDENT IN AN ORGANIZED HEALTH CARE EDUCATION/TRAINING PROGRAM

## 2024-01-04 PROCEDURE — 82435 ASSAY OF BLOOD CHLORIDE: CPT | Performed by: STUDENT IN AN ORGANIZED HEALTH CARE EDUCATION/TRAINING PROGRAM

## 2024-01-04 RX ORDER — AMLODIPINE BESYLATE 10 MG/1
10 TABLET ORAL DAILY
Status: DISCONTINUED | OUTPATIENT
Start: 2024-01-05 | End: 2024-01-09 | Stop reason: HOSPADM

## 2024-01-04 RX ORDER — ACETAMINOPHEN 325 MG/1
975 TABLET ORAL EVERY 8 HOURS PRN
Status: DISCONTINUED | OUTPATIENT
Start: 2024-01-04 | End: 2024-01-09 | Stop reason: HOSPADM

## 2024-01-04 RX ORDER — MULTIVIT-MIN/IRON FUM/FOLIC AC 7.5 MG-4
1 TABLET ORAL DAILY
Status: DISCONTINUED | OUTPATIENT
Start: 2024-01-04 | End: 2024-01-09 | Stop reason: HOSPADM

## 2024-01-04 RX ORDER — HEPARIN SODIUM 5000 [USP'U]/ML
5000 INJECTION, SOLUTION INTRAVENOUS; SUBCUTANEOUS EVERY 8 HOURS
Status: DISCONTINUED | OUTPATIENT
Start: 2024-01-04 | End: 2024-01-09 | Stop reason: HOSPADM

## 2024-01-04 RX ORDER — CARVEDILOL 25 MG/1
25 TABLET ORAL 2 TIMES DAILY
Status: DISCONTINUED | OUTPATIENT
Start: 2024-01-04 | End: 2024-01-09 | Stop reason: HOSPADM

## 2024-01-04 RX ORDER — IPRATROPIUM BROMIDE AND ALBUTEROL SULFATE 2.5; .5 MG/3ML; MG/3ML
3 SOLUTION RESPIRATORY (INHALATION)
Status: DISCONTINUED | OUTPATIENT
Start: 2024-01-04 | End: 2024-01-04

## 2024-01-04 RX ORDER — NAPROXEN SODIUM 220 MG/1
324 TABLET, FILM COATED ORAL ONCE
Status: COMPLETED | OUTPATIENT
Start: 2024-01-04 | End: 2024-01-04

## 2024-01-04 RX ORDER — ALBUTEROL SULFATE 0.83 MG/ML
1.25 SOLUTION RESPIRATORY (INHALATION) EVERY 6 HOURS PRN
Status: DISCONTINUED | OUTPATIENT
Start: 2024-01-04 | End: 2024-01-09 | Stop reason: HOSPADM

## 2024-01-04 RX ORDER — VALSARTAN 320 MG/1
320 TABLET ORAL DAILY
Status: DISCONTINUED | OUTPATIENT
Start: 2024-01-05 | End: 2024-01-09 | Stop reason: HOSPADM

## 2024-01-04 RX ORDER — HYDRALAZINE HYDROCHLORIDE 50 MG/1
50 TABLET, FILM COATED ORAL EVERY 8 HOURS
Status: DISCONTINUED | OUTPATIENT
Start: 2024-01-04 | End: 2024-01-05

## 2024-01-04 RX ORDER — ACETAMINOPHEN 325 MG/1
975 TABLET ORAL ONCE
Status: COMPLETED | OUTPATIENT
Start: 2024-01-04 | End: 2024-01-04

## 2024-01-04 RX ORDER — HYDROXYZINE HYDROCHLORIDE 10 MG/1
10 TABLET, FILM COATED ORAL EVERY 6 HOURS PRN
Status: DISCONTINUED | OUTPATIENT
Start: 2024-01-04 | End: 2024-01-09 | Stop reason: HOSPADM

## 2024-01-04 RX ORDER — IPRATROPIUM BROMIDE AND ALBUTEROL SULFATE 2.5; .5 MG/3ML; MG/3ML
3 SOLUTION RESPIRATORY (INHALATION) EVERY 6 HOURS PRN
Status: DISCONTINUED | OUTPATIENT
Start: 2024-01-04 | End: 2024-01-09 | Stop reason: HOSPADM

## 2024-01-04 RX ORDER — METOCLOPRAMIDE 5 MG/1
5 TABLET ORAL EVERY 6 HOURS PRN
Status: DISCONTINUED | OUTPATIENT
Start: 2024-01-04 | End: 2024-01-09 | Stop reason: HOSPADM

## 2024-01-04 RX ORDER — FLUTICASONE FUROATE AND VILANTEROL 100; 25 UG/1; UG/1
1 POWDER RESPIRATORY (INHALATION)
Status: DISCONTINUED | OUTPATIENT
Start: 2024-01-04 | End: 2024-01-04

## 2024-01-04 RX ORDER — CLONIDINE HYDROCHLORIDE 0.1 MG/1
0.1 TABLET ORAL 3 TIMES DAILY
Status: DISCONTINUED | OUTPATIENT
Start: 2024-01-04 | End: 2024-01-04

## 2024-01-04 RX ORDER — IPRATROPIUM BROMIDE AND ALBUTEROL SULFATE 2.5; .5 MG/3ML; MG/3ML
3 SOLUTION RESPIRATORY (INHALATION) ONCE
Status: COMPLETED | OUTPATIENT
Start: 2024-01-04 | End: 2024-01-04

## 2024-01-04 RX ORDER — DOXAZOSIN 2 MG/1
2 TABLET ORAL 2 TIMES DAILY
Status: DISCONTINUED | OUTPATIENT
Start: 2024-01-04 | End: 2024-01-04

## 2024-01-04 RX ORDER — ATORVASTATIN CALCIUM 80 MG/1
80 TABLET, FILM COATED ORAL NIGHTLY
Status: DISCONTINUED | OUTPATIENT
Start: 2024-01-04 | End: 2024-01-09 | Stop reason: HOSPADM

## 2024-01-04 RX ORDER — HYDRALAZINE HYDROCHLORIDE 20 MG/ML
10 INJECTION INTRAMUSCULAR; INTRAVENOUS ONCE
Status: COMPLETED | OUTPATIENT
Start: 2024-01-04 | End: 2024-01-04

## 2024-01-04 RX ORDER — HYDRALAZINE HYDROCHLORIDE 100 MG/1
100 TABLET, FILM COATED ORAL EVERY 8 HOURS
Status: DISCONTINUED | OUTPATIENT
Start: 2024-01-04 | End: 2024-01-04

## 2024-01-04 RX ORDER — BISMUTH SUBSALICYLATE 262 MG
1 TABLET,CHEWABLE ORAL DAILY
Status: DISCONTINUED | OUTPATIENT
Start: 2024-01-04 | End: 2024-01-04 | Stop reason: CLARIF

## 2024-01-04 RX ORDER — DIPHENHYDRAMINE HCL 25 MG
25 CAPSULE ORAL ONCE
Status: COMPLETED | OUTPATIENT
Start: 2024-01-04 | End: 2024-01-04

## 2024-01-04 RX ORDER — NICARDIPINE HYDROCHLORIDE 0.2 MG/ML
2.5-15 INJECTION INTRAVENOUS CONTINUOUS
Status: DISCONTINUED | OUTPATIENT
Start: 2024-01-04 | End: 2024-01-06

## 2024-01-04 RX ORDER — FUROSEMIDE 40 MG/1
40 TABLET ORAL DAILY
Status: DISCONTINUED | OUTPATIENT
Start: 2024-01-05 | End: 2024-01-04

## 2024-01-04 RX ADMIN — HYDRALAZINE HYDROCHLORIDE 10 MG: 20 INJECTION INTRAMUSCULAR; INTRAVENOUS at 11:14

## 2024-01-04 RX ADMIN — HEPARIN SODIUM 5000 UNITS: 5000 INJECTION INTRAVENOUS; SUBCUTANEOUS at 21:17

## 2024-01-04 RX ADMIN — NICARDIPINE HYDROCHLORIDE 5 MG/HR: 0.2 INJECTION, SOLUTION INTRAVENOUS at 18:57

## 2024-01-04 RX ADMIN — CARVEDILOL 25 MG: 25 TABLET, FILM COATED ORAL at 21:18

## 2024-01-04 RX ADMIN — ATORVASTATIN CALCIUM 80 MG: 80 TABLET, FILM COATED ORAL at 21:18

## 2024-01-04 RX ADMIN — IPRATROPIUM BROMIDE AND ALBUTEROL SULFATE 3 ML: .5; 3 SOLUTION RESPIRATORY (INHALATION) at 22:16

## 2024-01-04 RX ADMIN — HYDRALAZINE HYDROCHLORIDE 50 MG: 50 TABLET, FILM COATED ORAL at 21:17

## 2024-01-04 RX ADMIN — ACETAMINOPHEN 975 MG: 325 TABLET ORAL at 11:00

## 2024-01-04 RX ADMIN — HEPARIN SODIUM 5000 UNITS: 5000 INJECTION INTRAVENOUS; SUBCUTANEOUS at 15:09

## 2024-01-04 RX ADMIN — ACETAMINOPHEN 975 MG: 325 TABLET ORAL at 20:19

## 2024-01-04 RX ADMIN — Medication 1 TABLET: at 15:09

## 2024-01-04 RX ADMIN — DIPHENHYDRAMINE HYDROCHLORIDE 25 MG: 25 CAPSULE ORAL at 23:20

## 2024-01-04 RX ADMIN — ASPIRIN 81 MG CHEWABLE TABLET 324 MG: 81 TABLET CHEWABLE at 11:00

## 2024-01-04 RX ADMIN — IPRATROPIUM BROMIDE AND ALBUTEROL SULFATE 3 ML: .5; 3 SOLUTION RESPIRATORY (INHALATION) at 11:00

## 2024-01-04 RX ADMIN — NICARDIPINE HYDROCHLORIDE 5 MG/HR: 0.2 INJECTION, SOLUTION INTRAVENOUS at 11:40

## 2024-01-04 RX ADMIN — IPRATROPIUM BROMIDE AND ALBUTEROL SULFATE 3 ML: .5; 3 SOLUTION RESPIRATORY (INHALATION) at 12:08

## 2024-01-04 ASSESSMENT — ACTIVITIES OF DAILY LIVING (ADL)
DRESSING YOURSELF: INDEPENDENT
GROOMING: INDEPENDENT
ADEQUATE_TO_COMPLETE_ADL: YES
JUDGMENT_ADEQUATE_SAFELY_COMPLETE_DAILY_ACTIVITIES: YES
HEARING - RIGHT EAR: FUNCTIONAL
PATIENT'S MEMORY ADEQUATE TO SAFELY COMPLETE DAILY ACTIVITIES?: YES
FEEDING YOURSELF: INDEPENDENT
HEARING - LEFT EAR: FUNCTIONAL
BATHING: INDEPENDENT
WALKS IN HOME: INDEPENDENT
TOILETING: INDEPENDENT

## 2024-01-04 ASSESSMENT — ENCOUNTER SYMPTOMS
FATIGUE: 1
MYALGIAS: 1
PALPITATIONS: 0
WHEEZING: 1
VOMITING: 0
SHORTNESS OF BREATH: 1
DIARRHEA: 1
CHEST TIGHTNESS: 1
COUGH: 1
EYES NEGATIVE: 1
FEVER: 1
ABDOMINAL PAIN: 0
NAUSEA: 0
CHILLS: 1
BLOOD IN STOOL: 0

## 2024-01-04 ASSESSMENT — PAIN - FUNCTIONAL ASSESSMENT
PAIN_FUNCTIONAL_ASSESSMENT: 0-10

## 2024-01-04 ASSESSMENT — PAIN DESCRIPTION - LOCATION: LOCATION: HEAD

## 2024-01-04 ASSESSMENT — COLUMBIA-SUICIDE SEVERITY RATING SCALE - C-SSRS
1. IN THE PAST MONTH, HAVE YOU WISHED YOU WERE DEAD OR WISHED YOU COULD GO TO SLEEP AND NOT WAKE UP?: NO
6. HAVE YOU EVER DONE ANYTHING, STARTED TO DO ANYTHING, OR PREPARED TO DO ANYTHING TO END YOUR LIFE?: NO
2. HAVE YOU ACTUALLY HAD ANY THOUGHTS OF KILLING YOURSELF?: NO

## 2024-01-04 ASSESSMENT — PAIN DESCRIPTION - DESCRIPTORS: DESCRIPTORS: DISCOMFORT

## 2024-01-04 ASSESSMENT — PAIN SCALES - GENERAL
PAINLEVEL_OUTOF10: 3
PAINLEVEL_OUTOF10: 0 - NO PAIN
PAINLEVEL_OUTOF10: 6

## 2024-01-04 NOTE — CONSULTS
"Stacey Heath   53 y.o.    @WT@  MRN/Room: [id number]/13/13-A  DOA: 1/4/2024    Reason for consult: ESRD on HD  Requesting physician: Aneta Swartz MD  ___________________________________________________________________________________  P/C:  SOB, chest pain, fatigue  ___________________________________________________________________________________  HPI:  Stacey Heath is a 53 y.o. female with history of ESRD on hemodialysis, T2DM, hypertension and HFpEF who has presented with c/o SOB, nausea and chest pain. She is found to be hypertensive in ED and started on cardene drip. She could not go to HD unit due to these complains. Nephrology consulted for continuation of HD needs    ROS: systems reviewed and negative except mentioned in HPI    In the ER: /71 (BP Location: Left arm, Patient Position: Lying)   Pulse (!) 111   Temp 36.5 °C (97.7 °F) (Temporal)   Resp 20   Ht 1.397 m (4' 7\")   Wt 56.7 kg (125 lb)   SpO2 100%   BMI 29.05 kg/m²      Past Medical History  She has a past medical history of Breast pain (05/18/2021), Dependence on renal dialysis (CMS/HCC) (11/21/2022), Dry eye syndrome of bilateral lacrimal glands (03/07/2017), Essential (primary) hypertension (12/27/2022), History of acute pancreatitis (12/21/2020), Migraines, Other conditions influencing health status (09/01/2021), Unspecified diastolic (congestive) heart failure (CMS/HCC) (11/21/2022), and Vaginal dryness (07/29/2022).    Surgical History  She has a past surgical history that includes Tubal ligation (03/07/2017); Other surgical history (03/07/2017); Appendectomy (03/07/2017); Other surgical history (03/07/2017); Other surgical history (12/08/2021); Other surgical history (12/08/2021); CT angio abdomen w and or wo IV IV contrast (4/23/2023); and IR venogram dialysis (8/5/2021).     Social History  She reports that she has never smoked. She has never used smokeless tobacco. She reports that she does not drink alcohol and does not use " drugs.    Family History  Family History   Problem Relation Name Age of Onset    Other (Cerebrovascular Accident) Father      Heart failure Paternal Grandmother      Breast cancer Paternal Grandmother      Other (Primary Cervical Cancer) Paternal Grandmother      Diabetes Paternal Grandfather         Meds:   [START ON 1/5/2024] amLODIPine, 10 mg, Daily  atorvastatin, 80 mg, Nightly  carvedilol, 25 mg, BID  heparin (porcine), 5,000 Units, q8h  hydrALAZINE, 50 mg, q8h  ipratropium-albuteroL, 3 mL, q6h  multivitamin with minerals, 1 tablet, Daily  [START ON 1/5/2024] valsartan, 320 mg, Daily      niCARdipine, Last Rate: 5 mg/hr (01/04/24 1300)      albuterol, 1.25 mg, q6h PRN  hydrOXYzine HCL, 10 mg, q6h PRN  metoclopramide, 5 mg, q6h PRN      Current Outpatient Medications   Medication Instructions    albuterol 1.25 mg, nebulization, Every 6 hours PRN    amLODIPine (Norvasc) 10 mg tablet TAKE 1 TABLET BY MOUTH ONCE DAILY BEFORE DIALYSIS    atorvastatin (LIPITOR) 80 mg, oral, Nightly    carvedilol (COREG) 25 mg, oral, 2 times daily    cloNIDine (Catapres) 0.1 mg tablet TAKE 1 TABLET BY MOUTH THREE TIMES A DAY    doxazosin (Cardura) 2 mg tablet TAKE 1 TABLET BY MOUTH TWO TIMES A DAY    fluticasone propion-salmeteroL (Advair Diskus) 100-50 mcg/dose diskus inhaler 1 puff, inhalation, Every 12 hours    furosemide (LASIX) 40 mg, oral, Daily    gabapentin (NEURONTIN) 300 mg, oral, Nightly    hydrALAZINE (Apresoline) 100 mg tablet TAKE 1 TABLET BY MOUTH EVERY 8 HOURS    hydrOXYzine HCL (ATARAX) 10 mg, oral, Every 6 hours PRN    metoclopramide (Reglan) 5 mg tablet TAKE 1 TABLET BY MOUTH EVERY 6 HOURS AS NEEDED    multivitamin tablet 1 tablet, oral, Daily    multivitamin tablet TAKE 1 TABLET BY MOUTH ONCE DAILY    ondansetron (ZOFRAN) 4 mg, oral, Every 8 hours PRN    polyethylene glycol (Glycolax, Miralax) 17 gram/dose powder MIX ONE CAPFUL (17 GRAMS) IN 8 OUNCES OF LIQUID AND DRINK BY MOUTH ONCE DAILY    polyethylene glycol  "(GLYCOLAX, MIRALAX) 17 g, oral, 2 times daily    SUMAtriptan (Imitrex) 50 mg tablet TAKE 1 TABLET BY MOUTH FOR MIGRAINE. MAY REPEAT EVERY 2 HOURS. MAX 200MG/DAY.    SUMAtriptan (IMITREX) 50 mg, oral, Once as needed, MAY REPEAT EVERY 2 HOURS. MAX 200MG/DAY.    valsartan (Diovan) 320 mg tablet TAKE 1 TABLET BY MOUTH ONCE DAILY        ___________________________________________________________________________________  VITALS:  Temp:  [36.4 °C (97.5 °F)-36.5 °C (97.7 °F)] 36.5 °C (97.7 °F)  Heart Rate:  [] 111  Resp:  [0-48] 20  BP: (146-237)/() 146/71     Intake/Output Summary (Last 24 hours) at 1/4/2024 1548  Last data filed at 1/4/2024 1300  Gross per 24 hour   Intake 27.29 ml   Output --   Net 27.29 ml      No intake/output data recorded.  No intake/output data recorded.   [unfilled]     PHYSICAL EXAMINATION:  General appearance: Awake and alert, oriented  on NC 2LPM  Skin: no apparent rash  Heart: normal S1 and S2, no murmurs heard or friction rub  Lungs: NVB with occasional wheezing/crackles  Abdomen: soft, non-tender  Extremities: No edema  ACCESS: RUE AVF sore however palpable thrill and audible bruit    ___________________________________________________________________________________  INVESTIGATIONS:  Results from last 7 days   Lab Units 01/04/24  0954   WBC AUTO x10*3/uL 6.0   RBC AUTO x10*6/uL 2.78*   HEMOGLOBIN g/dL 8.3*   HEMATOCRIT % 25.1*     Results from last 7 days   Lab Units 01/04/24  0954   SODIUM mmol/L 144   POTASSIUM mmol/L 4.0   CHLORIDE mmol/L 101   CO2 mmol/L 31   BUN mg/dL 32*   CREATININE mg/dL 9.64*   CALCIUM mg/dL 9.4   PHOSPHORUS mg/dL 4.6   MAGNESIUM mg/dL 2.45*   BILIRUBIN TOTAL mg/dL 0.6   ALT U/L 9   AST U/L 12         No results found for: \"ALBUR\", \"NOQ31EIH\"   No results found for the last 90 days.      ___________________________________________________________________________________  ASSESSMENT:  Stacey Heath is a 53 y.o. female with history of ESRD on " hemodialysis, T2DM, hypertension and HFpEF who has presented with c/o SOB, nausea and chest pain. She is found to be hypertensive in ED and started on cardene drip. She could not go to HD unit due to these complains. Nephrology consulted for continuation of HD needs      #ESRD on HD (TTS):  2/2 DKD  On dialysis x 2 years  Outpatient facility - [facility]  Access - RUE AVF   Dry weight - 59 kg  Still makes some urine    #HTN  - home meds: carvedilol 25 mg BID, valsartan 320mg OD, amlodipine 10mg OD, hydralazine 100mg TID, furosemide 40mg OD, cardura 2mg BID, Clonidine 0.1mg TID    #Anemia  - Hb 8.3    #MBD  - 9.4/4.6    CXR: 1/4  1. Small left pleural effusion with associated atelectasis and/or   pneumonitis.   2. Patchy opacity in the right lung which may represent edema and/or   pneumonitis.     ___________________________________________________________________________________  RECOMMENDATIONS:  - HD today with UF 3000 ml as tolerated  - Renal diet  - Renal MVI  - Keep MAP >65 or SBP >90  - Strict I/O monitoring, daily weights, daily BMP  - Will continue to follow    Patient is discussed with the attending.    Jo Wade MD  Nephrology Fellow   Daytime / Weekend Renal Pager [phone]  After 7 pm Emergencies [phone] Pager [phone]

## 2024-01-04 NOTE — H&P
History Of Present Illness  Stacey Heath is a 53 y.o. female admitted to the CICU with hypertensive emergency.     PMH ESRD on HD T, TH, S, DM, HTN, HFpEF (EF 60% in 6/2023) who presented to the ED with shortness of breath and concerns about her BP being elevated despite taking her home PO meds. Patient said she began having chest pain and shortness of breath yesterday evening and felt like there was “fluid in her lungs.” This was also associated with some muscle aches and diarrhea. Her last HD session was Tuesday, 1/2, and she did not go this morning because she was feeling poorly. She took all of her HTN meds this morning (carvedilol, valsartan, amlodipine, hydralazine). She says this happens to her several times yearly; most recently admitted to [facility] on 12/14. She endorses fevers, chills, weakness, denies any abdominal pain, nausea, or vomiting.   In the ED her BP was found to be elevated to 193/69 and she was started on a nicardipine infusion and given duonebs. Upon arrival to the CICU BP down to 146/71.     In the ED:  Vitals: 193/96, HR 92, RR 18, pO2 100% RA    Labs:  RFP: Na 144, K 4.0, Cl 101, HCO3 31, BUN 32, Cr 9.64, Mg 2.45  Troponin 38  BNP 1470  CBC: WBC 6.0, Hgb 8.3, Plt 182    Imaging:  CXR 1/4/24:  IMPRESSION:  1. Small left pleural effusion with associated atelectasis and/or  pneumonitis.  2. Patchy opacity in the right lung which may represent edema and/or  pneumonitis.  3. Additional findings as described above.      PMH  ESRD on HD  T2DM  HTN  COPD  HFpEF  CAD    Home medications  Valsartan 160mg BID  Carvedilol 25mg BID  Albuterol inhaler   Amlodipine 10mg daily   Atorvastatin 80mg daily  Vitamin b  Hydralazine 50mg TID  Pantoprazole 40mg daily   Gabapentin 300mg daily  Aranesp once weekly  Aspirin 81mg daily   Acetaminophen 650mg q8 PRN    Social  -Previously smoking 1/3 PPD, quit 2 years ago  -Denies EtOH  -Denies illicit drugs     Past cardiac workup:  Echo 6/2023:  CONCLUSIONS:  1. Left  ventricular systolic function is normal with a 55-60% estimated ejection fraction.  2. Moderately increased left ventricular septal thickness.  3. Spectral Doppler shows a pseudonormal pattern of left ventricular diastolic filling.  4. The left ventricular posterior wall thickness is moderately increased.  5. There is an elevated mean left atrial pressure.  6. There is severe concentric left ventricular hypertrophy.  7. The left atrium is moderately dilated.  8. There is moderate mitral annular calcification.    Left and Right heart catheterization 3/2022:  1. Mild non-obstructive coronary artery disease.  2. Mild pulmonary hypertension with elevated left sided filling pressures (PCWP of 30mmHg)  preserved to high CI/CO  non-obstructive mild coronary  R dominant system.      Past Medical History  Past Medical History:   Diagnosis Date    Breast pain 05/18/2021    History of breast pain    Dependence on renal dialysis (CMS/[facility]) 11/21/2022    Hemodialysis patient    Dry eye syndrome of bilateral lacrimal glands 03/07/2017    Dry eyes    Essential (primary) hypertension 12/27/2022    Hypertension    History of acute pancreatitis 12/21/2020    History of acute pancreatitis    Migraines     History of migraine    Other conditions influencing health status 09/01/2021    History of nephrostomy    Unspecified diastolic (congestive) heart failure (CMS/[facility]) 11/21/2022    Heart failure with preserved left ventricular function (HFpEF)    Vaginal dryness 07/29/2022    Vaginal dryness       Surgical History  Past Surgical History:   Procedure Laterality Date    APPENDECTOMY  03/07/2017    Appendectomy    CT ABDOMEN ANGIOGRAM W AND/OR WO IV CONTRAST  4/23/2023    CT ABDOMEN ANGIOGRAM W AND/OR WO IV CONTRAST [facility]    IR VENOGRAM DIALYSIS  8/5/2021    IR VENOGRAM DIALYSIS 8/5/2021    OTHER SURGICAL HISTORY  03/07/2017    Cystoscopy With Pyeloscopy With Removal Of Calculus    OTHER SURGICAL HISTORY  03/07/2017    Anoscopy For Polyp  Removal    OTHER SURGICAL HISTORY  12/08/2021    Arteriovenous fistula creation procedure    OTHER SURGICAL HISTORY  12/08/2021    Dialysis tunneled catheter placement    TUBAL LIGATION  03/07/2017    Tubal Ligation        Social History  She reports that she has never smoked. She has never used smokeless tobacco. She reports that she does not drink alcohol and does not use drugs.    Family History  Family History   Problem Relation Name Age of Onset    Other (Cerebrovascular Accident) Father      Heart failure Paternal Grandmother      Breast cancer Paternal Grandmother      Other (Primary Cervical Cancer) Paternal Grandmother      Diabetes Paternal Grandfather          Allergies  Codeine, Iodine, and Shellfish containing products    Review of Systems   Constitutional:  Positive for chills, fatigue and fever.   HENT: Negative.     Eyes: Negative.    Respiratory:  Positive for cough, chest tightness, shortness of breath and wheezing.    Cardiovascular:  Positive for chest pain. Negative for palpitations and leg swelling.   Gastrointestinal:  Positive for diarrhea. Negative for abdominal pain, blood in stool, nausea and vomiting.   Genitourinary: Negative.    Musculoskeletal:  Positive for myalgias.        Physical Exam  Constitutional:       General: She is not in acute distress.     Appearance: Normal appearance.   HENT:      Head: Normocephalic and atraumatic.      Mouth/Throat:      Mouth: Mucous membranes are moist.      Pharynx: No oropharyngeal exudate or posterior oropharyngeal erythema.   Eyes:      General: No scleral icterus.     Pupils: Pupils are equal, round, and reactive to light.   Cardiovascular:      Rate and Rhythm: Regular rhythm. Tachycardia present.      Heart sounds: No murmur heard.     No friction rub. No gallop.   Pulmonary:      Effort: Pulmonary effort is normal. No respiratory distress.      Breath sounds: Rales present.   Abdominal:      General: Abdomen is flat. Bowel sounds are normal.  "There is no distension.      Palpations: Abdomen is soft.      Tenderness: There is no abdominal tenderness.   Musculoskeletal:         General: No swelling or deformity.      Right lower leg: No edema.      Left lower leg: No edema.   Skin:     General: Skin is warm and dry.      Coloration: Skin is not jaundiced.      Findings: No erythema or rash.   Neurological:      General: No focal deficit present.      Mental Status: She is alert.   Psychiatric:         Mood and Affect: Mood normal.         Judgment: Judgment normal.          Last Recorded Vitals  Blood pressure 146/71, pulse (!) 111, temperature 36.5 °C (97.7 °F), temperature source Temporal, resp. rate 20, height 1.397 m (4' 7\"), weight 56.7 kg (125 lb), SpO2 100 %.    Relevant Results  Results for orders placed or performed during the hospital encounter of 01/04/24 (from the past 24 hour(s))   CBC and Auto Differential   Result Value Ref Range    WBC 6.0 4.4 - 11.3 x10*3/uL    nRBC 0.0 0.0 - 0.0 /100 WBCs    RBC 2.78 (L) 4.00 - 5.20 x10*6/uL    Hemoglobin 8.3 (L) 12.0 - 16.0 g/dL    Hematocrit 25.1 (L) 36.0 - 46.0 %    MCV 90 80 - 100 fL    MCH 29.9 26.0 - 34.0 pg    MCHC 33.1 32.0 - 36.0 g/dL    RDW 14.4 11.5 - 14.5 %    Platelets 182 150 - 450 x10*3/uL    Neutrophils % 73.8 40.0 - 80.0 %    Immature Granulocytes %, Automated 0.5 0.0 - 0.9 %    Lymphocytes % 12.8 13.0 - 44.0 %    Monocytes % 6.5 2.0 - 10.0 %    Eosinophils % 5.4 0.0 - 6.0 %    Basophils % 1.0 0.0 - 2.0 %    Neutrophils Absolute 4.40 1.20 - 7.70 x10*3/uL    Immature Granulocytes Absolute, Automated 0.03 0.00 - 0.70 x10*3/uL    Lymphocytes Absolute 0.76 (L) 1.20 - 4.80 x10*3/uL    Monocytes Absolute 0.39 0.10 - 1.00 x10*3/uL    Eosinophils Absolute 0.32 0.00 - 0.70 x10*3/uL    Basophils Absolute 0.06 0.00 - 0.10 x10*3/uL   Comprehensive metabolic panel   Result Value Ref Range    Glucose 95 74 - 99 mg/dL    Sodium 144 136 - 145 mmol/L    Potassium 4.0 3.5 - 5.3 mmol/L    Chloride 101 98 - " 107 mmol/L    Bicarbonate 31 21 - 32 mmol/L    Anion Gap 16 10 - 20 mmol/L    Urea Nitrogen 32 (H) 6 - 23 mg/dL    Creatinine 9.64 (H) 0.50 - 1.05 mg/dL    eGFR 4 (L) >60 mL/min/1.73m*2    Calcium 9.4 8.6 - 10.6 mg/dL    Albumin 3.7 3.4 - 5.0 g/dL    Alkaline Phosphatase 124 (H) 33 - 110 U/L    Total Protein 7.3 6.4 - 8.2 g/dL    AST 12 9 - 39 U/L    Bilirubin, Total 0.6 0.0 - 1.2 mg/dL    ALT 9 7 - 45 U/L   Magnesium   Result Value Ref Range    Magnesium 2.45 (H) 1.60 - 2.40 mg/dL   Phosphorus   Result Value Ref Range    Phosphorus 4.6 2.5 - 4.9 mg/dL   Troponin I, High Sensitivity   Result Value Ref Range    Troponin I, High Sensitivity 38 (H) 0 - 34 ng/L   B-Type Natriuretic Peptide   Result Value Ref Range    BNP 1,470 (H) 0 - 99 pg/mL   ECG 12 lead   Result Value Ref Range    Ventricular Rate 99 BPM    Atrial Rate 99 BPM    MN Interval 146 ms    QRS Duration 74 ms    QT Interval 370 ms    QTC Calculation(Bazett) 474 ms    P Axis 48 degrees    R Axis 4 degrees    T Axis 149 degrees    QRS Count 16 beats    Q Onset 219 ms    P Onset 146 ms    P Offset 195 ms    T Offset 404 ms    QTC Fredericia 437 ms   Troponin I, High Sensitivity   Result Value Ref Range    Troponin I, High Sensitivity 39 (H) 0 - 34 ng/L   BLOOD GAS VENOUS FULL PANEL   Result Value Ref Range    POCT pH, Venous 7.46 (H) 7.33 - 7.43 pH    POCT pCO2, Venous 48 41 - 51 mm Hg    POCT pO2, Venous 40 35 - 45 mm Hg    POCT SO2, Venous 44 (L) 45 - 75 %    POCT Oxy Hemoglobin, Venous 43.1 (L) 45.0 - 75.0 %    POCT Hematocrit Calculated, Venous 25.0 (L) 36.0 - 46.0 %    POCT Sodium, Venous 142 136 - 145 mmol/L    POCT Potassium, Venous 4.0 3.5 - 5.3 mmol/L    POCT Chloride, Venous 105 98 - 107 mmol/L    POCT Ionized Calicum, Venous 1.21 1.10 - 1.33 mmol/L    POCT Glucose, Venous 109 (H) 74 - 99 mg/dL    POCT Lactate, Venous 0.8 0.4 - 2.0 mmol/L    POCT Base Excess, Venous 9.2 (H) -2.0 - 3.0 mmol/L    POCT HCO3 Calculated, Venous 34.1 (H) 22.0 - 26.0  mmol/L    POCT Hemoglobin, Venous 8.4 (L) 12.0 - 16.0 g/dL    POCT Anion Gap, Venous 7.0 (L) 10.0 - 25.0 mmol/L    Patient Temperature 37.0 degrees Celsius    FiO2 28 %   Influenza A, and B PCR   Result Value Ref Range    Flu A Result Not Detected Not Detected    Flu B Result Not Detected Not Detected   SARS-CoV-2 RT PCR   Result Value Ref Range    Coronavirus 2019, PCR Not Detected Not Detected   POCT GLUCOSE   Result Value Ref Range    POCT Glucose 188 (H) 74 - 99 mg/dL      CXR 1/4/24:  IMPRESSION:  1. Small left pleural effusion with associated atelectasis and/or  pneumonitis.  2. Patchy opacity in the right lung which may represent edema and/or  pneumonitis.  3. Additional findings as described above.       Assessment/Plan   Principal Problem:    ESRD (end stage renal disease) on dialysis (CMS/[facility])      53 year old female PMH HFpEF, HTN, ESRD on HD T, TH, S, presenting to the CICU for hypertensive emergency, complaining of one day of dyspnea, chest pain, and constitutional symptoms. Patient is on an extensive home regimen for HTN management and denies missing any doses. She was scheduled for dialysis this morning however came to the ED because she has been feeling poorly. She is now on a nicardipine drip with stabilization of her blood pressure and planning for inpatient continuation of dialysis.     NEURO  ZAIN    CARDIOVASCULAR  #Hypertensive emergency   ::evidence of volume overload given pulmonary edema and hx ESRD  ::Currently on nicardipine drip and took all home meds  ::Home regimen (confirmed with patient): valsartan 160 BID, carvedilol 25 BID, amlodipine 10, hydralazine 50 TID  -Will plan for inpatient dialysis  -Continue with cardene drip and wean as tolerated  -Will continue home medications at scheduled dosing     #HTN  -as above    #HFpEF  ::Echo in 6/2023 with EF of 60%   -Continue with valsartan, carvedilol.     PULMONARY  #Pulmonary edema   ::Multifactorial i/s/o hypertensive emergency   -Will  proceed with dialysis and adequate blood pressure control    #COPD   ::PFTs 7/2022: FEV1/FVC 75.22. Mild obstructive pattern  ::Home regimen albuterol nebulizer only   -Will continue with DuoNebs as needed     RENAL/   #ESRD on HD  ::patient is anuric  ::last HD session 1/2/24  ::All electrolytes wnl on arrival  -Will proceed with inpatient dialysis, appreciate recommendations from nephrology     GI  ZAIN    Heme/Onc  #Chronic anemia i/s/o renal failure   -Daily CBCs, Hgb on admission 8.3  -Will continue with weekly aranesp injections as outpatient     ID  ZAIN    F: Dialysis  E: PRN  N: Renal diet  A: PIVs, R AV fistula    GI ppx: not indicated  DVT ppx: SQ heparin     Code status: Full code (confirmed on admission)  NOK: DaughterDiya [phone]      Aneta Swartz MD  Internal Medicine, PGY-2

## 2024-01-04 NOTE — ED TRIAGE NOTES
Pt coming in with SOB, she also states she feels like her b/p is elevated and feels like she has fluid on her lungs, pt has PMH dialysis, T,TH, S right arm fistula, DM, HTN

## 2024-01-04 NOTE — PROGRESS NOTES
Stacey Heath  Age: 53 y.o.  MRN: [id number]  Date: 1/2/2024  Location of service: in community    Program Details  Medicaid Community Clinical Case Management  Status: Enrolled  Effective Dates: 10/17/2023 - present  Responsible Staff: PORFIRIO Valderrama      Goals Reviewed:  Problem: Anxiety       Goal: Attempts to manage anxiety with help       Priority: High         Goal: Verbalizes ways to manage anxiety       Priority: High         Goal: Implements measures to reduce anxiety       Priority: High            Summary:  Provider and patient met at patient's home. Provider utilized empathic listening to facilitate a discussion with patient about how she is currently doing. Patient reported feeling stressed and having some anxiety. Provider and patient discussed mindfulness through guided meditation as a way for patient to deal with her anxiety. Provider led patient through a guided meditation which patient reported she enjoyed. Provider asked patient to attempt another guided meditation before their next appointment which patient was open to.     Appointment start time: 1300  Appointment completion time: 1345  Total time spent with patient (in minutes): 45      PORFIRIO Valderrama

## 2024-01-04 NOTE — ED PROVIDER NOTES
"History of Present Illness   CC: Shortness of Breath     History provided by: Patient  Limitations to History: None    HPI:  Stacey Heath is a 53 y.o. female with history of COPD, CHF, hypertension, ESRD on Tuesday, Thursday, Saturday hemodialysis via right upper extremity AV fistula presenting the emergency department with a few days of diarrhea, myalgias, and generalized fatigue.  Has been compliant with all of her medications, but is now having shortness of breath.  Last got dialysis on Tuesday, reports get a full session.  1 to go to dialysis this morning, but did not feel well enough to new her blood pressure was elevated and should be sent to the ED and not dialyzed so she came to the ED instead.  Denies any chest pain, measured fevers, nausea, vomiting.  Denies any blood in her stool.  Has not taken any medications at home for her symptoms.  Did get her flu shot this year.  Is requiring 2 L nasal oxygen, normally wears oxygen as needed at home.  Feels \"like I am running.\"    External Records Reviewed: None    Physical Exam   Triage vitals:  T 36.4 °C (97.5 °F)    BP (!) 193/69  RR 18  O2 92 % None (Room air)    Vital signs reviewed in nursing triage note, EMR flow sheets, and at patient's bedside.   General: Awake, alert, in no acute distress  Eyes: Gaze conjugate.  No scleral icterus or injection  HENT: Normo-cephalic, atraumatic. No stridor.  CV: Intermittently tachycardic rate, Regular rhythm.  Left radial pulse 2+.  Right upper extremity AV fistula with palpable thrill.  Bilateral DP pulses are 2+.  No lower extremity edema.  Resp: Slightly tachypneic, speaking in full sentences.  Diminished breath sounds bilaterally, bibasilar crackles are present.  GI: Soft, non-distended, non-tender. No rebound or guarding.  MSK/Extremities: No gross bony deformities. Moving all extremities  Skin: Warm. Appropriate color  Neuro: Alert. Oriented. Face symmetric. Speech is fluent.  Gross strength and " sensation intact in b/l UE and LEs  Psych: Appropriate mood and affect    ED Course & Medical Decision Making   ED Course:  ED Course as of 01/05/24 1602   Thu Jan 04, 2024   1056 Troponin I, High Sensitivity(!)  Troponin mildly elevated [SH]   1056 B-Type Natriuretic Peptide(!)  BNP markedly elevated [SH]   1056 Comprehensive metabolic panel(!)  Normal electrolytes.  BUN and creatinine elevated consistent with ESRD LFTs normal [SH]   1057 CBC and Auto Differential(!)  CBC with normal blood cell count, mild anemia, normal platelets [SH]   1057 Magnesium(!)  Normal magnesium [SH]   1059 XR chest 2 views  Chest x-ray with findings consistent with volume overload [SH]   1109 BLOOD GAS VENOUS FULL PANEL(!)  Normal pH, normal pCO2, normal lactate [SH]   1149 Troponin I, High Sensitivity(!)  Troponin elevation stable [SH]   1208 Pt having increased chest tightness, will obtain repeat EKG and give additional breathing treatments [LP]   1300 Influenza A, and B PCR  Flu negative [SH]   1300 SARS-CoV-2 RT PCR  Covid negative [SH]   1513 ECG 12 lead  EKG obtained at 1010 demonstrate normal sinus rhythm with rate 95, normal axis, normal intervals, no ST segment elevations or depressions.  There are T wave inversions noted in lead I, aVL, and V5 and V6, these are present on prior EKGs as well. [SH]      ED Course User Index  [LP] Laina Arevalo DO  [SH] Figueroa Gay MD         Diagnoses as of 01/05/24 1602   ESRD (end stage renal disease) on dialysis (CMS/[facility])   Hypertensive emergency   Troponin level elevated       Differential diagnoses considered include but are not limited to: COPD exacerbation, CHF exacerbation, volume overload, COVID, flu, dehydration, ACS, pneumonia    Social Determinants Limiting Care: None identified    MDM:  53 y.o. female presenting to the emergency department with flulike symptoms for the past few days as well as shortness of breath and concern for volume overload.  On arrival vital signs notable  for hypertension, mild hypoxemia requiring 2 L nasal cannula.  Patient 87% on room air.  Complain of shortness of breath but no chest pain.  Point-of-care ultrasound performed demonstrating findings consistent with bilateral pulmonary edema in the setting of missed dialysis.    [place] workup interpreted as above with elevated BNP and troponin.  Blood pressure did not respond to IV hydralazine, given she is already taken all of her home blood pressure medications, we will start her on a Cardene drip.  Discussed patient with CICU attending who accepted her for admission for management of hypertensive emergency and volume overload.  Patient will receive dialysis in CICU.    Disposition   As a result of their workup, the patient will require admission to the hospital.  The patient was informed of their diagnosis.  Patient was given the opportunity to ask questions and answered them.  Patient agreed to be admitted to the hospital.    Procedures     Performed by: Figueroa Gay MD  Authorized by: Figueroa Gay MD  Cardiac Indications: fluid overload              Respiratory Indications: shortness of breath  Procedure: Cardiac Ultrasound    Findings:    The pericardial space was visualized and was NEGATIVE for a significant pericardial effusion.  Activity: Ventricular contractions were visualized.  LV: LV systolic function was NORMAL.  RV: RV size was NORMAL.    Impression:  Cardiac: The focused cardiac ultrasound exam was NORMAL.  Procedure: Thoracic Ultrasound    Findings:  R Lung Sliding: The RIGHT chest was evaluated and LUNG SLIDING was visualized.  L Lung Sliding: The LEFT chest was evaluated and LUNG SLIDING was visualized.      A-lines: The RIGHT chest was evaluated and there were NO A-LINES visualized  B-lines: The RIGHT chest was evaluated and multiple B-LINES were visualized        Impression:  Thorax: The focused thoracic ultrasound exam had ABNORMAL findings as specified.          Patient seen and discussed  with ED attending physician.    Erlin Gay MD  PGY 3 Emergency Medicine         Figueroa Gay MD  Resident  01/05/24 [id number]

## 2024-01-05 ENCOUNTER — APPOINTMENT (OUTPATIENT)
Dept: CARDIOLOGY | Facility: HOSPITAL | Age: 54
DRG: 304 | End: 2024-01-05
Payer: COMMERCIAL

## 2024-01-05 ENCOUNTER — DOCUMENTATION (OUTPATIENT)
Dept: BEHAVIORAL HEALTH | Facility: CLINIC | Age: 54
End: 2024-01-05
Payer: COMMERCIAL

## 2024-01-05 LAB
ALBUMIN SERPL BCP-MCNC: 3.2 G/DL (ref 3.4–5)
ALP SERPL-CCNC: 105 U/L (ref 33–110)
ALT SERPL W P-5'-P-CCNC: 5 U/L (ref 7–45)
ANION GAP SERPL CALC-SCNC: 12 MMOL/L (ref 10–20)
AST SERPL W P-5'-P-CCNC: 11 U/L (ref 9–39)
BILIRUB SERPL-MCNC: 0.7 MG/DL (ref 0–1.2)
BUN SERPL-MCNC: 14 MG/DL (ref 6–23)
CALCIUM SERPL-MCNC: 8.6 MG/DL (ref 8.6–10.6)
CHLORIDE SERPL-SCNC: 102 MMOL/L (ref 98–107)
CO2 SERPL-SCNC: 30 MMOL/L (ref 21–32)
CREAT SERPL-MCNC: 4.41 MG/DL (ref 0.5–1.05)
ERYTHROCYTE [DISTWIDTH] IN BLOOD BY AUTOMATED COUNT: 14.6 % (ref 11.5–14.5)
GFR SERPL CREATININE-BSD FRML MDRD: 11 ML/MIN/1.73M*2
GLUCOSE SERPL-MCNC: 69 MG/DL (ref 74–99)
HCT VFR BLD AUTO: 24 % (ref 36–46)
HGB BLD-MCNC: 7.6 G/DL (ref 12–16)
MAGNESIUM SERPL-MCNC: 2.12 MG/DL (ref 1.6–2.4)
MCH RBC QN AUTO: 29.8 PG (ref 26–34)
MCHC RBC AUTO-ENTMCNC: 31.7 G/DL (ref 32–36)
MCV RBC AUTO: 94 FL (ref 80–100)
NRBC BLD-RTO: 0 /100 WBCS (ref 0–0)
PLATELET # BLD AUTO: 161 X10*3/UL (ref 150–450)
POTASSIUM SERPL-SCNC: 3.7 MMOL/L (ref 3.5–5.3)
PROT SERPL-MCNC: 5.6 G/DL (ref 6.4–8.2)
RBC # BLD AUTO: 2.55 X10*6/UL (ref 4–5.2)
SODIUM SERPL-SCNC: 140 MMOL/L (ref 136–145)
WBC # BLD AUTO: 4.4 X10*3/UL (ref 4.4–11.3)

## 2024-01-05 PROCEDURE — 2500000001 HC RX 250 WO HCPCS SELF ADMINISTERED DRUGS (ALT 637 FOR MEDICARE OP)

## 2024-01-05 PROCEDURE — 99231 SBSQ HOSP IP/OBS SF/LOW 25: CPT

## 2024-01-05 PROCEDURE — 97161 PT EVAL LOW COMPLEX 20 MIN: CPT | Mod: GP

## 2024-01-05 PROCEDURE — 85027 COMPLETE CBC AUTOMATED: CPT

## 2024-01-05 PROCEDURE — 2500000001 HC RX 250 WO HCPCS SELF ADMINISTERED DRUGS (ALT 637 FOR MEDICARE OP): Performed by: STUDENT IN AN ORGANIZED HEALTH CARE EDUCATION/TRAINING PROGRAM

## 2024-01-05 PROCEDURE — 84075 ASSAY ALKALINE PHOSPHATASE: CPT

## 2024-01-05 PROCEDURE — 2500000002 HC RX 250 W HCPCS SELF ADMINISTERED DRUGS (ALT 637 FOR MEDICARE OP, ALT 636 FOR OP/ED)

## 2024-01-05 PROCEDURE — 1100000001 HC PRIVATE ROOM DAILY

## 2024-01-05 PROCEDURE — 83735 ASSAY OF MAGNESIUM: CPT

## 2024-01-05 PROCEDURE — 93005 ELECTROCARDIOGRAM TRACING: CPT

## 2024-01-05 PROCEDURE — 2500000004 HC RX 250 GENERAL PHARMACY W/ HCPCS (ALT 636 FOR OP/ED)

## 2024-01-05 PROCEDURE — 36415 COLL VENOUS BLD VENIPUNCTURE: CPT

## 2024-01-05 PROCEDURE — 99291 CRITICAL CARE FIRST HOUR: CPT

## 2024-01-05 PROCEDURE — 94640 AIRWAY INHALATION TREATMENT: CPT

## 2024-01-05 RX ORDER — HYDRALAZINE HYDROCHLORIDE 25 MG/1
100 TABLET, FILM COATED ORAL EVERY 8 HOURS
Status: DISCONTINUED | OUTPATIENT
Start: 2024-01-05 | End: 2024-01-09 | Stop reason: HOSPADM

## 2024-01-05 RX ORDER — DIPHENHYDRAMINE HCL 25 MG
25 CAPSULE ORAL ONCE
Status: COMPLETED | OUTPATIENT
Start: 2024-01-05 | End: 2024-01-05

## 2024-01-05 RX ORDER — GABAPENTIN 300 MG/1
300 CAPSULE ORAL DAILY
Status: DISCONTINUED | OUTPATIENT
Start: 2024-01-05 | End: 2024-01-09 | Stop reason: HOSPADM

## 2024-01-05 RX ORDER — ALBUTEROL SULFATE 90 UG/1
2 INHALANT RESPIRATORY (INHALATION) 2 TIMES DAILY
Status: ON HOLD | COMMUNITY
End: 2024-01-09 | Stop reason: SDUPTHER

## 2024-01-05 RX ORDER — GUAIFENESIN 600 MG/1
600 TABLET, EXTENDED RELEASE ORAL 2 TIMES DAILY PRN
Status: DISCONTINUED | OUTPATIENT
Start: 2024-01-05 | End: 2024-01-09 | Stop reason: HOSPADM

## 2024-01-05 RX ORDER — SUMATRIPTAN SUCCINATE 50 MG/1
50 TABLET ORAL EVERY 2 HOUR PRN
Status: DISCONTINUED | OUTPATIENT
Start: 2024-01-05 | End: 2024-01-09 | Stop reason: HOSPADM

## 2024-01-05 RX ORDER — PANTOPRAZOLE SODIUM 40 MG/1
40 TABLET, DELAYED RELEASE ORAL DAILY
Status: ON HOLD | COMMUNITY
End: 2024-01-09 | Stop reason: SDUPTHER

## 2024-01-05 RX ORDER — HYDRALAZINE HYDROCHLORIDE 25 MG/1
25 TABLET, FILM COATED ORAL ONCE
Status: COMPLETED | OUTPATIENT
Start: 2024-01-05 | End: 2024-01-05

## 2024-01-05 RX ORDER — ISOSORBIDE MONONITRATE 30 MG/1
30 TABLET, EXTENDED RELEASE ORAL DAILY
Status: DISCONTINUED | OUTPATIENT
Start: 2024-01-05 | End: 2024-01-09 | Stop reason: HOSPADM

## 2024-01-05 RX ADMIN — ACETAMINOPHEN 975 MG: 325 TABLET ORAL at 06:17

## 2024-01-05 RX ADMIN — DIPHENHYDRAMINE HYDROCHLORIDE 25 MG: 25 CAPSULE ORAL at 17:07

## 2024-01-05 RX ADMIN — AMLODIPINE BESYLATE 10 MG: 10 TABLET ORAL at 07:40

## 2024-01-05 RX ADMIN — VALSARTAN 320 MG: 160 TABLET, FILM COATED ORAL at 09:00

## 2024-01-05 RX ADMIN — GABAPENTIN 300 MG: 300 CAPSULE ORAL at 22:45

## 2024-01-05 RX ADMIN — ISOSORBIDE MONONITRATE 30 MG: 30 TABLET, EXTENDED RELEASE ORAL at 18:46

## 2024-01-05 RX ADMIN — ACETAMINOPHEN 975 MG: 325 TABLET ORAL at 17:07

## 2024-01-05 RX ADMIN — HYDRALAZINE HYDROCHLORIDE 100 MG: 25 TABLET ORAL at 13:20

## 2024-01-05 RX ADMIN — Medication 1 TABLET: at 07:40

## 2024-01-05 RX ADMIN — HEPARIN SODIUM 5000 UNITS: 5000 INJECTION INTRAVENOUS; SUBCUTANEOUS at 05:26

## 2024-01-05 RX ADMIN — ALBUTEROL SULFATE 2.5 MG: 2.5 SOLUTION RESPIRATORY (INHALATION) at 21:39

## 2024-01-05 RX ADMIN — METOCLOPRAMIDE 5 MG: 10 TABLET ORAL at 06:18

## 2024-01-05 RX ADMIN — HYDRALAZINE HYDROCHLORIDE 50 MG: 50 TABLET, FILM COATED ORAL at 05:26

## 2024-01-05 RX ADMIN — HEPARIN SODIUM 5000 UNITS: 5000 INJECTION INTRAVENOUS; SUBCUTANEOUS at 13:20

## 2024-01-05 RX ADMIN — HYDRALAZINE HYDROCHLORIDE 25 MG: 25 TABLET, FILM COATED ORAL at 15:36

## 2024-01-05 RX ADMIN — ATORVASTATIN CALCIUM 80 MG: 80 TABLET, FILM COATED ORAL at 21:23

## 2024-01-05 RX ADMIN — CARVEDILOL 25 MG: 25 TABLET, FILM COATED ORAL at 07:40

## 2024-01-05 RX ADMIN — CARVEDILOL 25 MG: 25 TABLET, FILM COATED ORAL at 21:23

## 2024-01-05 RX ADMIN — HYDRALAZINE HYDROCHLORIDE 100 MG: 25 TABLET ORAL at 21:23

## 2024-01-05 RX ADMIN — GUAIFENESIN 600 MG: 600 TABLET, EXTENDED RELEASE ORAL at 12:40

## 2024-01-05 RX ADMIN — HEPARIN SODIUM 5000 UNITS: 5000 INJECTION INTRAVENOUS; SUBCUTANEOUS at 21:23

## 2024-01-05 ASSESSMENT — COGNITIVE AND FUNCTIONAL STATUS - GENERAL
CLIMB 3 TO 5 STEPS WITH RAILING: A LITTLE
MOBILITY SCORE: 20
WALKING IN HOSPITAL ROOM: A LITTLE
MOVING TO AND FROM BED TO CHAIR: A LITTLE
STANDING UP FROM CHAIR USING ARMS: A LITTLE

## 2024-01-05 ASSESSMENT — PAIN SCALES - GENERAL
PAINLEVEL_OUTOF10: 3
PAINLEVEL_OUTOF10: 0 - NO PAIN

## 2024-01-05 ASSESSMENT — PAIN - FUNCTIONAL ASSESSMENT
PAIN_FUNCTIONAL_ASSESSMENT: 0-10

## 2024-01-05 ASSESSMENT — PAIN DESCRIPTION - LOCATION: LOCATION: HEAD

## 2024-01-05 NOTE — PROGRESS NOTES
Stacey Heath   53 y.o.    @WT@  MRN/Room: [id number]/13/13-A  DOA: 1/4/2024    ___________________________________________________________________________________  SUBJECTIVE: I have seen and examined Stacey Heath at the bedside.   Stacey Heath charts and 24 hrs events reviewed. Mentions feeling better, denies of any dyspnea, nausea, vomiting however mentions appetite has been low for long time.    ___________________________________________________________________________________  OBJECTIVE:  Temp:  [36 °C (96.8 °F)-36.7 °C (98.1 °F)] 36.3 °C (97.3 °F)  Heart Rate:  [] 89  Resp:  [11-32] 15  BP: (126-200)/(59-97) 154/72  FiO2 (%):  [28 %] 28 %     Intake/Output Summary (Last 24 hours) at 1/5/2024 1240  Last data filed at 1/5/2024 0600  Gross per 24 hour   Intake 1466.67 ml   Output 1680 ml   Net -213.33 ml      I/O last 3 completed shifts:  In: 1466.7 (25.9 mL/kg) [I.V.:1066.7 (18.8 mL/kg); Other:400]  Out: 1680 (29.6 mL/kg) [Other:1680]  Weight: 56.7 kg        General appearance: Awake and alert, oriented  on NC 2LPM  Skin: no apparent rash  Heart: normal S1 and S2, no murmurs heard or friction rub  Lungs: NVB with occasional wheezing/crackles  Abdomen: soft, non-tender  Extremities: No edema  ACCESS: RUE AVF sore however palpable thrill and audible bruit    Meds:     Current Facility-Administered Medications:     acetaminophen (Tylenol) tablet 975 mg, 975 mg, oral, q8h PRN, Bruna Joyce MD, 975 mg at 01/05/24 0617    albuterol 2.5 mg /3 mL (0.083 %) nebulizer solution 1.25 mg, 1.25 mg, nebulization, q6h PRN, Aneta Swartz MD    amLODIPine (Norvasc) tablet 10 mg, 10 mg, oral, Daily, Aneta Swartz MD, 10 mg at 01/05/24 0740    atorvastatin (Lipitor) tablet 80 mg, 80 mg, oral, Nightly, Aneta Swartz MD, 80 mg at 01/04/24 2118    carvedilol (Coreg) tablet 25 mg, 25 mg, oral, BID, Aneta Swartz MD, 25 mg at 01/05/24 0740    guaiFENesin (Mucinex) 12 hr tablet 600 mg, 600 mg, oral, BID PRN, Aneta Swartz MD     "heparin (porcine) injection 5,000 Units, 5,000 Units, subcutaneous, q8h, Aneta Swartz MD, 5,000 Units at 01/05/24 0526    hydrALAZINE (Apresoline) tablet 100 mg, 100 mg, oral, q8h, Jeromy Priest MD    hydrOXYzine HCL (Atarax) tablet 10 mg, 10 mg, oral, q6h PRN, Aneta Swartz MD    ipratropium-albuteroL (Duo-Neb) 0.5-2.5 mg/3 mL nebulizer solution 3 mL, 3 mL, nebulization, q6h PRN, Judson Canas DO, 3 mL at 01/04/24 2216    metoclopramide (Reglan) tablet 5 mg, 5 mg, oral, q6h PRN, Aneta Swartz MD, 5 mg at 01/05/24 0618    multivitamin with minerals 1 tablet, 1 tablet, oral, Daily, Aneta Swartz MD, 1 tablet at 01/05/24 0740    niCARdipine (Cardene) 40 mg in sodium chloride 200 mL (0.2 mg/mL) infusion (premix), 2.5-15 mg/hr, intravenous, Continuous, Figueroa Gay MD, Last Rate: 12.5 mL/hr at 01/05/24 0400, 2.5 mg/hr at 01/05/24 0400    SUMAtriptan (Imitrex) tablet 50 mg, 50 mg, oral, q2h PRN, Aneta Swartz MD    valsartan (Diovan) tablet 320 mg, 320 mg, oral, Daily, Aneta Swartz MD, 320 mg at 01/05/24 0900    Investigations:  Results from last 7 days   Lab Units 01/05/24  0445   WBC AUTO x10*3/uL 4.4   RBC AUTO x10*6/uL 2.55*   HEMOGLOBIN g/dL 7.6*   HEMATOCRIT % 24.0*     Results from last 7 days   Lab Units 01/05/24  0445 01/04/24  0954   SODIUM mmol/L 140 144   POTASSIUM mmol/L 3.7 4.0   CHLORIDE mmol/L 102 101   CO2 mmol/L 30 31   BUN mg/dL 14 32*   CREATININE mg/dL 4.41* 9.64*   CALCIUM mg/dL 8.6 9.4   PHOSPHORUS mg/dL  --  4.6   MAGNESIUM mg/dL 2.12 2.45*   BILIRUBIN TOTAL mg/dL 0.7 0.6   ALT U/L 5* 9   AST U/L 11 12         No results found for: \"ALBUR\", \"IOU59NDG\"   No results found for the last 90 days.      ___________________________________________________________________________________  ASSESSMENT:  Stacey Heath is a 53 y.o. female with history of ESRD on hemodialysis, T2DM, hypertension and HFpEF who has presented with c/o SOB, nausea and chest pain. She is found to be hypertensive in ED and " started on cardene drip. She could not go to HD unit due to these complains. Nephrology consulted for continuation of HD needs        #ESRD on HD (TTS):  2/2 DKD  On dialysis x 2 years  [facility]  [facility] - [facility]   Dry weight - 59 kg  Still makes some urine     #HTN  - home meds: carvedilol 25 mg BID, valsartan 320mg OD, amlodipine 10mg OD, hydralazine 100mg TID, furosemide 40mg OD, cardura 2mg BID, Clonidine 0.1mg TID     #Anemia of chronic disease  - Hb 7.6     #MBD  - 9.4/4.6    UPDATES:  - mentions feeling better today, appetite remains low  - did not tolerate fluid removal beyond 1.3 L as cramping started  - no increased in oxygen requirements     CXR: 1/4  1. Small left pleural effusion with associated atelectasis and/or   pneumonitis.   2. Patchy opacity in the right lung which may represent edema and/or   pneumonitis.      ___________________________________________________________________________________  RECOMMENDATIONS:  - HD tomorrow   - Renal diet  - Renal MVI  - Keep MAP >65 or SBP >90  - Strict I/O monitoring, daily weights, daily BMP  - Will continue to follow    Patient is discussed with the attending.         Jo Wade MD  Nephrology Fellow   Daytime / Weekend Renal Pager [phone]  After 7 pm Emergencies [phone] Pager [phone]

## 2024-01-05 NOTE — PROGRESS NOTES
"Stacey Heath  Age: 53 y.o.  MRN: [id number]  Date: 1/5/2024  Location of service: in community    Program Details  Medicaid Community Clinical Case Management  Status: Enrolled  Effective Dates: 10/17/2023 - present  Responsible Staff: PORFIRIO Valderrama      Goals Reviewed:  Problem: Anxiety       Goal: Attempts to manage anxiety with help       Priority: High         Goal: Verbalizes ways to manage anxiety       Priority: High         Goal: Implements measures to reduce anxiety       Priority: High        Problem: Kidney Issues       Goal: Improve health to get on kidney transplant list       Priority: High        Problem: Risk of Uncoordinated Care       Goal: Care will be Coordinated and Supported by a Multidisciplinary Team of Providers       Priority: High          Summary:  This writer met with patient in the [facility] CICU.  Patient states she just got done walking and it wore her out. Patient states she has continued to have difficulty breathing on and off.  Patient states she is in the CICU because she was told, \"all her heart numbers are high.\"  Patient does not know when she'll be leaving ICU.  Patient states she believes they are going to run some tests once her IV is done running.  Patient states she had dialysis yesterday, but they weren't able to take off much fluid. Patient states she was cramping real bad.  Kidney team came in with this writer present.  Patient states her SpO2 dropped below 90% last night when she was off the oxygen.  Patient states she wants to switch all of her specialty care, including nephrology, back to  once she gets her insurance switched over, she spends time talking about why she feels mistreated by her current nephrologist. She states she wants to keep her PCP at [facility].  This writer helps patient find and setup a drawing radha on her tablet to help relieve anxiety, unfortunately after several attempts to set it up, the radha failed. Patient states she is happy " being able to use the coloring game that she already has.     This writer spoke with patient's nurse, she states:  Patient's systolic has remained under 180 for a while, which has been their goal.  Patient's walking went well, her vitals remained stable.  Patient's transfer orders have gone through, so she will be leaving the CICU once they get her a bed somewhere else.    Appointment start time: 1100  Appointment completion time: 1225  Total time spent with patient (in minutes): 85      Roberta Gallego RN

## 2024-01-05 NOTE — PROGRESS NOTES
Stacey Heath is a 53 y.o. female on day 1 of admission presenting with ESRD (end stage renal disease) on dialysis (CMS/[facility]).    Subjective   Patient had dialysis overnight, says she had a lot of muscle cramping. Remains on cardene drip. Has multiple complaints this morning including headaches, congestion, and itching. She continues to feel winded when she talks. Denies chest pain this morning.     53 year old female PMH HFpEF, HTN, ESRD on HD T, TH, S, presenting to the CICU for hypertensive emergency, complaining of one day of dyspnea, chest pain, and constitutional symptoms. Patient is on an extensive home regimen for HTN management and denies missing any doses. She was scheduled for dialysis this morning however came to the ED because she has been feeling poorly. She was started on a nicardipine drip and given dialysis on 1/4. The nicardipine was able to be discontinued on the morning of 1/5.     Her current HTN regimen includes:  -Amlodipine 10mg (home med)  -Carvedilol 25mg (home med)  -Hydralazine 100mg TID (home dose 50 TID)  -Valsartan 320mg (Home dose 160)    To-do's:   -If BP still poorly controlled, would recommend starting doxazosin, minoxidil, or consider clonidine (was previously on clonidine and doxazosin).   -Hold home sumatriptan with HTN emergencey       Objective     Physical Exam  Constitutional:       General: She is not in acute distress.  HENT:      Head: Normocephalic and atraumatic.      Mouth/Throat:      Mouth: Mucous membranes are moist.      Pharynx: No oropharyngeal exudate or posterior oropharyngeal erythema.   Eyes:      General: No scleral icterus.     Pupils: Pupils are equal, round, and reactive to light.   Cardiovascular:      Rate and Rhythm: Normal rate and regular rhythm.      Heart sounds: No murmur heard.     No friction rub. No gallop.   Pulmonary:      Effort: Pulmonary effort is normal. No respiratory distress.      Breath sounds: Normal breath sounds.   Abdominal:       "General: Abdomen is flat. Bowel sounds are normal. There is no distension.      Palpations: Abdomen is soft.      Tenderness: There is no abdominal tenderness.   Musculoskeletal:         General: No swelling or deformity.      Right lower leg: No edema.      Left lower leg: No edema.   Skin:     General: Skin is warm and dry.      Coloration: Skin is not jaundiced.      Findings: No erythema or rash.   Neurological:      General: No focal deficit present.   Psychiatric:         Mood and Affect: Mood normal.         Judgment: Judgment normal.         Last Recorded Vitals  Blood pressure 160/62, pulse 86, temperature 36.4 °C (97.5 °F), temperature source Tympanic, resp. rate 22, height 1.397 m (4' 7\"), weight 56.7 kg (125 lb), SpO2 96 %.  Intake/Output last 3 Shifts:  I/O last 3 completed shifts:  In: 1466.7 (25.9 mL/kg) [I.V.:1066.7 (18.8 mL/kg); Other:400]  Out: 1680 (29.6 mL/kg) [Other:1680]  Weight: 56.7 kg     Relevant Results  Results for orders placed or performed during the hospital encounter of 01/04/24 (from the past 24 hour(s))   CBC and Auto Differential   Result Value Ref Range    WBC 6.0 4.4 - 11.3 x10*3/uL    nRBC 0.0 0.0 - 0.0 /100 WBCs    RBC 2.78 (L) 4.00 - 5.20 x10*6/uL    Hemoglobin 8.3 (L) 12.0 - 16.0 g/dL    Hematocrit 25.1 (L) 36.0 - 46.0 %    MCV 90 80 - 100 fL    MCH 29.9 26.0 - 34.0 pg    MCHC 33.1 32.0 - 36.0 g/dL    RDW 14.4 11.5 - 14.5 %    Platelets 182 150 - 450 x10*3/uL    Neutrophils % 73.8 40.0 - 80.0 %    Immature Granulocytes %, Automated 0.5 0.0 - 0.9 %    Lymphocytes % 12.8 13.0 - 44.0 %    Monocytes % 6.5 2.0 - 10.0 %    Eosinophils % 5.4 0.0 - 6.0 %    Basophils % 1.0 0.0 - 2.0 %    Neutrophils Absolute 4.40 1.20 - 7.70 x10*3/uL    Immature Granulocytes Absolute, Automated 0.03 0.00 - 0.70 x10*3/uL    Lymphocytes Absolute 0.76 (L) 1.20 - 4.80 x10*3/uL    Monocytes Absolute 0.39 0.10 - 1.00 x10*3/uL    Eosinophils Absolute 0.32 0.00 - 0.70 x10*3/uL    Basophils Absolute 0.06 0.00 " - 0.10 x10*3/uL   Comprehensive metabolic panel   Result Value Ref Range    Glucose 95 74 - 99 mg/dL    Sodium 144 136 - 145 mmol/L    Potassium 4.0 3.5 - 5.3 mmol/L    Chloride 101 98 - 107 mmol/L    Bicarbonate 31 21 - 32 mmol/L    Anion Gap 16 10 - 20 mmol/L    Urea Nitrogen 32 (H) 6 - 23 mg/dL    Creatinine 9.64 (H) 0.50 - 1.05 mg/dL    eGFR 4 (L) >60 mL/min/1.73m*2    Calcium 9.4 8.6 - 10.6 mg/dL    Albumin 3.7 3.4 - 5.0 g/dL    Alkaline Phosphatase 124 (H) 33 - 110 U/L    Total Protein 7.3 6.4 - 8.2 g/dL    AST 12 9 - 39 U/L    Bilirubin, Total 0.6 0.0 - 1.2 mg/dL    ALT 9 7 - 45 U/L   Magnesium   Result Value Ref Range    Magnesium 2.45 (H) 1.60 - 2.40 mg/dL   Phosphorus   Result Value Ref Range    Phosphorus 4.6 2.5 - 4.9 mg/dL   Troponin I, High Sensitivity   Result Value Ref Range    Troponin I, High Sensitivity 38 (H) 0 - 34 ng/L   B-Type Natriuretic Peptide   Result Value Ref Range    BNP 1,470 (H) 0 - 99 pg/mL   ECG 12 lead   Result Value Ref Range    Ventricular Rate 99 BPM    Atrial Rate 99 BPM    AR Interval 146 ms    QRS Duration 74 ms    QT Interval 370 ms    QTC Calculation(Bazett) 474 ms    P Axis 48 degrees    R Axis 4 degrees    T Axis 149 degrees    QRS Count 16 beats    Q Onset 219 ms    P Onset 146 ms    P Offset 195 ms    T Offset 404 ms    QTC Fredericia 437 ms   Troponin I, High Sensitivity   Result Value Ref Range    Troponin I, High Sensitivity 39 (H) 0 - 34 ng/L   BLOOD GAS VENOUS FULL PANEL   Result Value Ref Range    POCT pH, Venous 7.46 (H) 7.33 - 7.43 pH    POCT pCO2, Venous 48 41 - 51 mm Hg    POCT pO2, Venous 40 35 - 45 mm Hg    POCT SO2, Venous 44 (L) 45 - 75 %    POCT Oxy Hemoglobin, Venous 43.1 (L) 45.0 - 75.0 %    POCT Hematocrit Calculated, Venous 25.0 (L) 36.0 - 46.0 %    POCT Sodium, Venous 142 136 - 145 mmol/L    POCT Potassium, Venous 4.0 3.5 - 5.3 mmol/L    POCT Chloride, Venous 105 98 - 107 mmol/L    POCT Ionized Calicum, Venous 1.21 1.10 - 1.33 mmol/L    POCT Glucose,  Venous 109 (H) 74 - 99 mg/dL    POCT Lactate, Venous 0.8 0.4 - 2.0 mmol/L    POCT Base Excess, Venous 9.2 (H) -2.0 - 3.0 mmol/L    POCT HCO3 Calculated, Venous 34.1 (H) 22.0 - 26.0 mmol/L    POCT Hemoglobin, Venous 8.4 (L) 12.0 - 16.0 g/dL    POCT Anion Gap, Venous 7.0 (L) 10.0 - 25.0 mmol/L    Patient Temperature 37.0 degrees Celsius    FiO2 28 %   Influenza A, and B PCR   Result Value Ref Range    Flu A Result Not Detected Not Detected    Flu B Result Not Detected Not Detected   SARS-CoV-2 RT PCR   Result Value Ref Range    Coronavirus 2019, PCR Not Detected Not Detected   POCT GLUCOSE   Result Value Ref Range    POCT Glucose 188 (H) 74 - 99 mg/dL   Electrocardiogram, 12-lead PRN ACS symptoms   Result Value Ref Range    Ventricular Rate 112 BPM    Atrial Rate 112 BPM    AK Interval 138 ms    QRS Duration 78 ms    QT Interval 356 ms    QTC Calculation(Bazett) 485 ms    P Axis 55 degrees    R Axis 20 degrees    T Axis 160 degrees    QRS Count 18 beats    Q Onset 217 ms    P Onset 148 ms    P Offset 197 ms    T Offset 395 ms    QTC Fredericia 438 ms   POCT GLUCOSE   Result Value Ref Range    POCT Glucose 160 (H) 74 - 99 mg/dL   ECG 12 lead   Result Value Ref Range    Ventricular Rate 95 BPM    Atrial Rate 95 BPM    AK Interval 144 ms    QRS Duration 88 ms    QT Interval 362 ms    QTC Calculation(Bazett) 454 ms    P Axis 50 degrees    R Axis 2 degrees    T Axis 149 degrees    QRS Count 16 beats    Q Onset 217 ms    P Onset 145 ms    P Offset 196 ms    T Offset 398 ms    QTC Fredericia 421 ms   Comprehensive metabolic panel   Result Value Ref Range    Glucose 69 (L) 74 - 99 mg/dL    Sodium 140 136 - 145 mmol/L    Potassium 3.7 3.5 - 5.3 mmol/L    Chloride 102 98 - 107 mmol/L    Bicarbonate 30 21 - 32 mmol/L    Anion Gap 12 10 - 20 mmol/L    Urea Nitrogen 14 6 - 23 mg/dL    Creatinine 4.41 (H) 0.50 - 1.05 mg/dL    eGFR 11 (L) >60 mL/min/1.73m*2    Calcium 8.6 8.6 - 10.6 mg/dL    Albumin 3.2 (L) 3.4 - 5.0 g/dL     Alkaline Phosphatase 105 33 - 110 U/L    Total Protein 5.6 (L) 6.4 - 8.2 g/dL    AST 11 9 - 39 U/L    Bilirubin, Total 0.7 0.0 - 1.2 mg/dL    ALT 5 (L) 7 - 45 U/L   Magnesium   Result Value Ref Range    Magnesium 2.12 1.60 - 2.40 mg/dL   CBC   Result Value Ref Range    WBC 4.4 4.4 - 11.3 x10*3/uL    nRBC 0.0 0.0 - 0.0 /100 WBCs    RBC 2.55 (L) 4.00 - 5.20 x10*6/uL    Hemoglobin 7.6 (L) 12.0 - 16.0 g/dL    Hematocrit 24.0 (L) 36.0 - 46.0 %    MCV 94 80 - 100 fL    MCH 29.8 26.0 - 34.0 pg    MCHC 31.7 (L) 32.0 - 36.0 g/dL    RDW 14.6 (H) 11.5 - 14.5 %    Platelets 161 150 - 450 x10*3/uL          Assessment/Plan   Principal Problem:    ESRD (end stage renal disease) on dialysis (CMS/[facility])    53 year old female PMH HFpEF, HTN, ESRD on HD T, TH, S, presenting to the CICU for hypertensive emergency, complaining of one day of dyspnea, chest pain, and constitutional symptoms. Patient is on an extensive home regimen for HTN management and denies missing any doses. She was scheduled for dialysis this morning however came to the ED because she has been feeling poorly. She is now weaned off nicardipine drip with stabilization of her blood pressure and planning for inpatient continuation of dialysis.     Updates 1/5:  -Continue uptitration of PO meds  -Stable for transfer to general medicine    NEURO  ZAIN     CARDIOVASCULAR  #Hypertensive emergency   ::evidence of volume overload given pulmonary edema and hx ESRD  ::Currently on nicardipine drip and took all home meds  ::Home regimen (confirmed with patient): valsartan 160 BID, carvedilol 25 BID, amlodipine 10, hydralazine 50 TID  -Will plan for inpatient dialysis  -Off cardene drip now  -Will continue home medications at scheduled dosing      #HTN  -as above     #HFpEF  ::Echo in 6/2023 with EF of 60%   -Continue with valsartan, carvedilol.      PULMONARY  #Pulmonary edema   ::Multifactorial i/s/o hypertensive emergency   -Will proceed with dialysis and adequate blood pressure  control     #COPD   ::PFTs 7/2022: FEV1/FVC 75.22. Mild obstructive pattern  ::Home regimen albuterol nebulizer only   -Will continue with DuoNebs as needed      RENAL/   #ESRD on HD  ::patient is anuric  ::last HD session 1/2/24  ::All electrolytes wnl on arrival  -Will proceed with inpatient dialysis, appreciate recommendations from nephrology        GI  ZAIN     Heme/Onc  #Chronic anemia i/s/o renal failure   -Daily CBCs, Hgb on admission 8.3  -Will continue with weekly aranesp injections as outpatient      ID  ZAIN     F: Dialysis  E: PRN  N: Renal diet  A: PIVs, R AV fistula     GI ppx: not indicated  DVT ppx: SQ heparin      Code status: Full code (confirmed on admission)  NOK: Daughter, Diya Jang ([phone] - [phone]       Aneta Swartz MD  Internal Medicine, PGY-2

## 2024-01-05 NOTE — PROGRESS NOTES
"Stacey Heath  Age: 53 y.o.  MRN: [id number]  Date: 1/4/2024  Location of service: in community    Program Details  Medicaid Community Clinical Case Management  Status: Enrolled  Effective Dates: 10/17/2023 - present  Responsible Staff: PORFIRIO Valderrama      Goals Reviewed:  Problem: Kidney Issues       Goal: Improve health to get on kidney transplant list       Priority: High        Problem: Negative Experience, Conflict with, or Distrust of Providers and/or Health System       Goal: Plan to Address Patient Specific Negative Experience, Distrust, or Conflict with Providers and/or Health System       Priority: High        Problem: Risk of Uncoordinated Care       Goal: Care will be Coordinated and Supported by a Multidisciplinary Team of Providers       Priority: High          Summary:  Patient is tearful when this writer first arrives, patient is doing a breathing treatment and can't talk right away. This writer waits for breathing treatment to end.  Patient is frustrated and tired because she feels like every time she feels like she is getting better, something happens.  Patient states she was fine yesterday and then last night her breathing got heavy and she figured there was fluid built up, but by this morning she was feeling really bad: difficulty breathing, BP high, heart racing, chest tightness, diarrhea, fatigue, exhaustion with ADLs. Patient states they told her, \"A lot of her heart numbers raised up.\"   Patient states she was told they are going to admit her.  Patient is frustrated by being in the ED.  Patient feels like she will never get a transplant because she is always in the hospital.  Patient remains tearful on and off throughout the appointment. Stating, \"every time I try to get better, I always go back.\" Patient admits to feeling hopeless, irritated, sad, and mad. Feels she is spending more time in the hospital than at home.  Patient denies all SI, patient states she will never think about " that because her brother-in-law completed suicide and she saw the pain that it put everyone through. Patient states if she ever felt that stressed or depressed she would absolutely talk to somebody.     Appointment start time: 1205  Appointment completion time: 1240  Total time spent with patient (in minutes): 35      Roberta Gallego RN

## 2024-01-05 NOTE — HOSPITAL COURSE
53 year old female PMH HFpEF, HTN, ESRD on HD T, TH, S, presenting to the CICU for hypertensive emergency, complaining of one day of dyspnea, chest pain, and constitutional symptoms. Patient is on an extensive home regimen for HTN management and denies missing any doses. She was scheduled for dialysis this morning however came to the ED because she has been feeling poorly.  She was diagnosed with respiratory failure due to volume overload and hypertensive emergency.  She was started on a nicardipine drip and given dialysis on 1/4. The nicardipine was able to be discontinued on the morning of 1/5 and she was restarted on home meds.  Transferred to the medical floor.

## 2024-01-05 NOTE — PROGRESS NOTES
Social Work Progress Note:  -Patient discussed during CICU interdisciplinary morning rounds.   -ICU Treatment Plan: Hypertensive, ESRD on dialysis, HD @ [facility]  fax . I called the [facility] location and was told that she transferred to [facility] on last week. Her first treatment there was Tuesday. She is scheduled on Tues, Thurs and Sat. Initial clinical informatino was faxed to the dialysis center  -Payer - [employer]     -Support system:  Primary contact on record is child Diya Jang  - Planned Disposition: The patient will likely return home  Potential Barriers: None at this time  - Anticipated date of discharge: 1/7  CESAR Tripathi, LSW

## 2024-01-05 NOTE — PROGRESS NOTES
Physical Therapy    Physical Therapy Evaluation    Patient Name: Stacey Heath  MRN: [id number]  Today's Date: 1/5/2024   Time Calculation  Start Time: 1101  Stop Time: 1114  Time Calculation (min): 13 min    Assessment/Plan   PT Assessment  PT Assessment Results: Decreased strength, Impaired balance, Decreased endurance, Decreased mobility  Rehab Prognosis: Good  Medical Staff Made Aware: Yes  End of Session Communication: Bedside nurse  End of Session Patient Position: Bed, 2 rail up, Alarm off, not on at start of session  IP OR SWING BED PT PLAN  Inpatient or Swing Bed: Inpatient  PT Plan  Treatment/Interventions: Bed mobility, Transfer training, Gait training, Stair training, Balance training, Endurance training  PT Plan: Skilled PT  PT Frequency: 2 times per week  PT Discharge Recommendations: Low intensity level of continued care  PT Recommended Transfer Status: Assist x1, Contact guard  PT - OK to Discharge: Yes    Subjective     General Visit Information:  General  Reason for Referral: Hypertensive emergency  Past Medical History Relevant to Rehab: ESRD on HD, DM, HTN, HFpEF (EF 60% 6/2023)  Prior to Session Communication: Bedside nurse  Patient Position Received: Bed, 3 rail up, Alarm off, not on at start of session    Home Living:  Home Living  Type of Home: House  Lives With:  (fiance and 13 y/o nephew)  Home Adaptive Equipment: None  Home Layout: Bed/bath upstairs, Stairs to alternate level with rails  Alternate Level Stairs-Rails: Both  Alternate Level Stairs-Number of Steps: 12  Home Access: Stairs to enter with rails  Entrance Stairs-Rails: Both  Entrance Stairs-Number of Steps: 5    Prior Level of Function:  Prior Function Per Pt/Caregiver Report  Level of [place]: Independent with ADLs and functional transfers  Ambulatory Assistance: Independent  Gait: Independent (denies falls; limited for the past year because of SOB; only able to walk ~200 ft then needing to rest)  Leisure: likes to read and  color    Precautions:  Precautions  Medical Precautions: Cardiac precautions, Fall precautions    Vital Signs:  Vital Signs  Heart Rate:  (PRE: 86; POST: 84)  SpO2:  (PRE: 96%; DURING: >/= 95%, POST: 97%)  BP:  (PRE: 145/62; POST: 147/64)    Objective     Pain:  Pain Assessment  Pain Assessment: 0-10  Pain Score: 0 - No pain    Cognition:  Cognition  Overall Cognitive Status: Within Functional Limits  Arousal/Alertness: Appropriate responses to stimuli  Orientation Level: Oriented X4  Following Commands: Follows all commands and directions without difficulty    General Assessments:   Activity Tolerance  Early Mobility/Exercise Safety Screen: Proceed with mobilization - No exclusion criteria met    Sensation  Light Touch: No apparent deficits    Strength  Strength Comments: B LE strength 4/5 throughout  Strength  Strength Comments: B LE strength 4/5 throughout    Static Sitting Balance  Static Sitting-Balance Support: No upper extremity supported, Feet supported  Static Sitting-Level of Assistance: Independent  Dynamic Sitting Balance  Dynamic Sitting-Balance Support: No upper extremity supported, Feet supported  Dynamic Sitting-Comments: Indep    Static Standing Balance  Static Standing-Balance Support: No upper extremity supported  Static Standing-Level of Assistance: Close supervision  Dynamic Standing Balance  Dynamic Standing-Balance Support: No upper extremity supported  Dynamic Standing-Comments: CGA    Functional Assessments:  Bed Mobility  Bed Mobility: Yes  Bed Mobility 1  Bed Mobility 1: Supine to sitting, Sitting to supine  Level of Assistance 1: Distant supervision    Transfers  Transfer: Yes  Transfer 1  Transfer From 1: Sit to  Transfer to 1: Stand  Technique 1: Sit to stand  Transfer Level of Assistance 1: Close supervision  Transfers 2  Transfer From 2: Stand to  Transfer to 2: Sit  Technique 2: Stand to sit  Transfer Level of Assistance 2: Close supervision    Ambulation/Gait  Training  Ambulation/Gait Training Performed: Yes  Ambulation/Gait Training 1  Surface 1: Level tile  Device 1: No device  Assistance 1: Contact guard  Quality of Gait 1:  (slow brendon)  Comments/Distance (ft) 1: pt needing 45 second rest break 2/2 SOB. 40 ft    Extremity/Trunk Assessments:  RLE   RLE : Within Functional Limits  LLE   LLE : Within Functional Limits    Outcome Measures:  [facility] Basic Mobility  Turning from your back to your side while in a flat bed without using bedrails: None  Moving from lying on your back to sitting on the side of a flat bed without using bedrails: None  Moving to and from bed to chair (including a wheelchair): A little  Standing up from a chair using your arms (e.g. wheelchair or bedside chair): A little  To walk in hospital room: A little  Climbing 3-5 steps with railing: A little  Basic Mobility - Total Score: 20    Confusion Assessment Method-ICU (CAM-ICU)  Feature 1: Acute Onset or Fluctuating Course: Negative  Overall CAM-ICU: Negative    FSS-ICU  Ambulation: Walks <50 feet with any assistance x1 or walks any distance with assistance x2 people  Rolling: Complete independence  Sitting: Complete independence  Transfer Sit-to-Stand: Supervision or set-up only  Transfer Supine-to-Sit: Complete independence  Total Score: 27    ICU Mobility Screen  Early Mobility/Exercise Safety Screen: Proceed with mobilization - No exclusion criteria met  E = Exercise and Early Mobility  Early Mobility/Exercise Safety Screen: Proceed with mobilization - No exclusion criteria met  Current Activity: Ambulating in mackey    Encounter Problems       Encounter Problems (Active)       Balance       Pt will be able to score >24 on Tinetti for low risk of falls (Progressing)       Start:  01/05/24    Expected End:  01/19/24               Mobility       Patient will ambulate >500 ft with no assist device and supervision (Progressing)       Start:  01/05/24    Expected End:  01/19/24            Patient will  ascend and descend a flight of stairs with handrails and supervision (Progressing)       Start:  01/05/24    Expected End:  01/19/24               Transfers       Patient to transfer to and from sit to supine indep (Progressing)       Start:  01/05/24    Expected End:  01/19/24            Patient will transfer sit to and from stand indep (Progressing)       Start:  01/05/24    Expected End:  01/19/24                 Education Documentation  Mobility Training, taught by Tri Caldera PT at 1/5/2024  1:31 PM.  Learner: Patient  Readiness: Acceptance  Method: Explanation  Response: Verbalizes Understanding    Education Comments  No comments found.    Signed by Tri Caldera DPT

## 2024-01-05 NOTE — ED NOTES
Pharmacy Medication History Review    Stacey Heath is a 53 y.o. female admitted for ESRD (end stage renal disease) on dialysis (CMS/[facility]). Pharmacy reviewed the patient's hsdcw-ls-zlnxcxury medications and allergies for accuracy.    The list below reflects the updated PTA list. Comments regarding how patient may be taking medications differently can be found in the Admit Orders Activity  Prior to Admission Medications   Prescriptions Last Dose Informant Patient Reported?   SUMAtriptan (Imitrex) 50 mg tablet  Self No   Sig: TAKE 1 TABLET BY MOUTH FOR MIGRAINE. MAY REPEAT EVERY 2 HOURS. MAX 200MG/DAY.   albuterol (Ventolin HFA) 90 mcg/actuation inhaler  Self Yes   Sig: Inhale 2 puffs 2 times a day.   albuterol 1.25 mg/3 mL nebulizer solution  Self No   Sig: Take 3 mL (1.25 mg) by nebulization every 6 hours if needed for wheezing.   amLODIPine (Norvasc) 10 mg tablet  Self No   Sig: TAKE 1 TABLET BY MOUTH ONCE DAILY BEFORE DIALYSIS   atorvastatin (Lipitor) 80 mg tablet  Self No   Sig: Take 1 tablet (80 mg) by mouth once daily at bedtime.   carvedilol (Coreg) 25 mg tablet  Self No   Sig: Take 1 tablet (25 mg) by mouth 2 times a day.   cloNIDine (Catapres) 0.1 mg tablet Not Taking Self No   Sig: TAKE 1 TABLET BY MOUTH THREE TIMES A DAY   Patient not taking: Reported on 1/5/2024   doxazosin (Cardura) 2 mg tablet Not Taking Self No   Sig: TAKE 1 TABLET BY MOUTH TWO TIMES A DAY   Patient not taking: Reported on 1/5/2024   fluticasone propion-salmeteroL (Advair Diskus) 100-50 mcg/dose diskus inhaler  Self No   Sig: Inhale 1 puff every 12 hours.   furosemide (Lasix) 40 mg tablet Not Taking Self No   Sig: Take 1 tablet (40 mg) by mouth once daily.   Patient not taking: Reported on 1/5/2024   gabapentin (Neurontin) 300 mg capsule  Self No   Sig: Take 1 capsule (300 mg) by mouth once daily at bedtime.   hydrALAZINE (Apresoline) 100 mg tablet  Self No   Sig: TAKE 1 TABLET BY MOUTH EVERY 8 HOURS   hydrOXYzine HCL (Atarax) 10 mg  tablet Not Taking Self No   Sig: Take 1 tablet (10 mg) by mouth every 6 hours if needed for itching.   Patient not taking: Reported on 1/5/2024   metoclopramide (Reglan) 5 mg tablet Not Taking Self No   Sig: TAKE 1 TABLET BY MOUTH EVERY 6 HOURS AS NEEDED   Patient not taking: Reported on 1/5/2024   multivitamin tablet  Self No   Sig: TAKE 1 TABLET BY MOUTH ONCE DAILY   ondansetron (Zofran) 4 mg tablet  Self No   Sig: Take 1 tablet (4 mg) by mouth every 8 hours if needed for nausea or vomiting.   Patient not taking: Reported on 10/26/2023   pantoprazole (ProtoNix) 40 mg EC tablet  Self Yes   Sig: Take 1 tablet (40 mg) by mouth once daily. Do not crush, chew, or split.   polyethylene glycol (Glycolax) 17 gram packet  Self No   Sig: Take 17 g by mouth 2 times a day.   Patient not taking: Reported on 10/26/2023   polyethylene glycol (Glycolax, Miralax) 17 gram/dose powder  Self No   Sig: MIX ONE CAPFUL (17 GRAMS) IN 8 OUNCES OF LIQUID AND DRINK BY MOUTH ONCE DAILY   Patient not taking: Reported on 10/26/2023   valsartan (Diovan) 320 mg tablet  Self No   Sig: TAKE 1 TABLET BY MOUTH ONCE DAILY      Facility-Administered Medications: None        The list below reflects the updated allergy list. Please review each documented allergy for additional clarification and justification.  Allergies  Reviewed by Mich Ramirez CPhT on 1/5/2024        Severity Reactions Comments    Codeine Not Specified Itching     Iodine Not Specified Hives     Shellfish Containing Products Not Specified Swelling SEAFOOD            Patient accepts M2B at discharge. Pharmacy has been updated to [facility].    Sources used to complete the med history include:     During patient interviewing, the patient was unable to confirm dosing/frequency of use for many of her medications. Patient admitted she only takes furosemide when she is in the hospital. Medication dispense report was used in addition to patient interviewing to complete  patient's medication list. [place] report shows gabapentin that was filled 10/26/23-- no other controlled substances have been filled recently.     Below are additional concerns with the patient's PTA list.  none    Mich Ramirez PharmD  [facility]    Please reach out via Secure Chat for questions, or if no response call [phone] or [employer]

## 2024-01-06 ENCOUNTER — APPOINTMENT (OUTPATIENT)
Dept: DIALYSIS | Facility: HOSPITAL | Age: 54
End: 2024-01-06
Payer: COMMERCIAL

## 2024-01-06 LAB
ALBUMIN SERPL BCP-MCNC: 3 G/DL (ref 3.4–5)
ANION GAP SERPL CALC-SCNC: 13 MMOL/L (ref 10–20)
BUN SERPL-MCNC: 27 MG/DL (ref 6–23)
CALCIUM SERPL-MCNC: 8.7 MG/DL (ref 8.6–10.6)
CHLORIDE SERPL-SCNC: 105 MMOL/L (ref 98–107)
CO2 SERPL-SCNC: 28 MMOL/L (ref 21–32)
CREAT SERPL-MCNC: 6.43 MG/DL (ref 0.5–1.05)
ERYTHROCYTE [DISTWIDTH] IN BLOOD BY AUTOMATED COUNT: 14.3 % (ref 11.5–14.5)
GFR SERPL CREATININE-BSD FRML MDRD: 7 ML/MIN/1.73M*2
GLUCOSE SERPL-MCNC: 85 MG/DL (ref 74–99)
HCT VFR BLD AUTO: 21.5 % (ref 36–46)
HGB BLD-MCNC: 7.1 G/DL (ref 12–16)
MAGNESIUM SERPL-MCNC: 2.3 MG/DL (ref 1.6–2.4)
MCH RBC QN AUTO: 30.2 PG (ref 26–34)
MCHC RBC AUTO-ENTMCNC: 33 G/DL (ref 32–36)
MCV RBC AUTO: 92 FL (ref 80–100)
NRBC BLD-RTO: 0 /100 WBCS (ref 0–0)
PHOSPHATE SERPL-MCNC: 4.7 MG/DL (ref 2.5–4.9)
PLATELET # BLD AUTO: 190 X10*3/UL (ref 150–450)
POTASSIUM SERPL-SCNC: 4.4 MMOL/L (ref 3.5–5.3)
RBC # BLD AUTO: 2.35 X10*6/UL (ref 4–5.2)
SODIUM SERPL-SCNC: 142 MMOL/L (ref 136–145)
WBC # BLD AUTO: 4.6 X10*3/UL (ref 4.4–11.3)

## 2024-01-06 PROCEDURE — 2500000001 HC RX 250 WO HCPCS SELF ADMINISTERED DRUGS (ALT 637 FOR MEDICARE OP): Performed by: STUDENT IN AN ORGANIZED HEALTH CARE EDUCATION/TRAINING PROGRAM

## 2024-01-06 PROCEDURE — 36415 COLL VENOUS BLD VENIPUNCTURE: CPT

## 2024-01-06 PROCEDURE — 83735 ASSAY OF MAGNESIUM: CPT

## 2024-01-06 PROCEDURE — 2500000004 HC RX 250 GENERAL PHARMACY W/ HCPCS (ALT 636 FOR OP/ED): Performed by: STUDENT IN AN ORGANIZED HEALTH CARE EDUCATION/TRAINING PROGRAM

## 2024-01-06 PROCEDURE — 99233 SBSQ HOSP IP/OBS HIGH 50: CPT | Performed by: STUDENT IN AN ORGANIZED HEALTH CARE EDUCATION/TRAINING PROGRAM

## 2024-01-06 PROCEDURE — 2500000001 HC RX 250 WO HCPCS SELF ADMINISTERED DRUGS (ALT 637 FOR MEDICARE OP)

## 2024-01-06 PROCEDURE — 99233 SBSQ HOSP IP/OBS HIGH 50: CPT | Performed by: INTERNAL MEDICINE

## 2024-01-06 PROCEDURE — 2500000004 HC RX 250 GENERAL PHARMACY W/ HCPCS (ALT 636 FOR OP/ED)

## 2024-01-06 PROCEDURE — 8010000001 HC DIALYSIS - HEMODIALYSIS PER DAY

## 2024-01-06 PROCEDURE — 80069 RENAL FUNCTION PANEL: CPT

## 2024-01-06 PROCEDURE — 85027 COMPLETE CBC AUTOMATED: CPT

## 2024-01-06 PROCEDURE — 1100000001 HC PRIVATE ROOM DAILY

## 2024-01-06 PROCEDURE — 2500000002 HC RX 250 W HCPCS SELF ADMINISTERED DRUGS (ALT 637 FOR MEDICARE OP, ALT 636 FOR OP/ED): Performed by: STUDENT IN AN ORGANIZED HEALTH CARE EDUCATION/TRAINING PROGRAM

## 2024-01-06 RX ADMIN — HYDRALAZINE HYDROCHLORIDE 100 MG: 25 TABLET ORAL at 06:47

## 2024-01-06 RX ADMIN — ATORVASTATIN CALCIUM 80 MG: 80 TABLET, FILM COATED ORAL at 20:31

## 2024-01-06 RX ADMIN — CARVEDILOL 25 MG: 25 TABLET, FILM COATED ORAL at 08:38

## 2024-01-06 RX ADMIN — ACETAMINOPHEN 975 MG: 325 TABLET ORAL at 04:08

## 2024-01-06 RX ADMIN — HYDRALAZINE HYDROCHLORIDE 100 MG: 25 TABLET ORAL at 16:55

## 2024-01-06 RX ADMIN — GABAPENTIN 300 MG: 300 CAPSULE ORAL at 20:32

## 2024-01-06 RX ADMIN — Medication 1 TABLET: at 08:38

## 2024-01-06 RX ADMIN — HEPARIN SODIUM 5000 UNITS: 5000 INJECTION INTRAVENOUS; SUBCUTANEOUS at 06:47

## 2024-01-06 RX ADMIN — IPRATROPIUM BROMIDE AND ALBUTEROL SULFATE 3 ML: .5; 3 SOLUTION RESPIRATORY (INHALATION) at 18:18

## 2024-01-06 RX ADMIN — ACETAMINOPHEN 975 MG: 325 TABLET ORAL at 20:31

## 2024-01-06 RX ADMIN — ISOSORBIDE MONONITRATE 30 MG: 30 TABLET, EXTENDED RELEASE ORAL at 08:38

## 2024-01-06 RX ADMIN — HYDROXYZINE HYDROCHLORIDE 10 MG: 10 TABLET ORAL at 04:03

## 2024-01-06 RX ADMIN — VALSARTAN 320 MG: 160 TABLET, FILM COATED ORAL at 08:38

## 2024-01-06 RX ADMIN — HYDRALAZINE HYDROCHLORIDE 100 MG: 25 TABLET ORAL at 23:11

## 2024-01-06 RX ADMIN — CARVEDILOL 25 MG: 25 TABLET, FILM COATED ORAL at 20:31

## 2024-01-06 RX ADMIN — HEPARIN SODIUM 5000 UNITS: 5000 INJECTION INTRAVENOUS; SUBCUTANEOUS at 20:32

## 2024-01-06 RX ADMIN — AMLODIPINE BESYLATE 10 MG: 10 TABLET ORAL at 08:38

## 2024-01-06 ASSESSMENT — PAIN DESCRIPTION - DESCRIPTORS: DESCRIPTORS: DISCOMFORT

## 2024-01-06 ASSESSMENT — PAIN SCALES - GENERAL
PAINLEVEL_OUTOF10: 0 - NO PAIN
PAINLEVEL_OUTOF10: 3
PAINLEVEL_OUTOF10: 3

## 2024-01-06 ASSESSMENT — PAIN - FUNCTIONAL ASSESSMENT
PAIN_FUNCTIONAL_ASSESSMENT: NO/DENIES PAIN
PAIN_FUNCTIONAL_ASSESSMENT: 0-10
PAIN_FUNCTIONAL_ASSESSMENT: NO/DENIES PAIN
PAIN_FUNCTIONAL_ASSESSMENT: 0-10

## 2024-01-06 ASSESSMENT — PAIN DESCRIPTION - LOCATION: LOCATION: HEAD

## 2024-01-06 NOTE — NURSING NOTE
Report from Sending RN:    Report From: Cristiane ( PONCHO)  Recent Surgery of Procedure: No  Baseline Level of Consciousness (LOC): A/o x 4  Oxygen Use: Yes  Type: NC 2 liters  Diabetic: No  Last BP Med Given Day of Dialysis: yes  Last Pain Med Given: none  Lab Tests to be Obtained with Dialysis: No  Blood Transfusion to be Given During Dialysis: No  Available IV Access: Yes  Medications to be Administered During Dialysis: No  Continuous IV Infusion Running: No  Restraints on Currently or in the Last 24 Hours: No  Hand-Off Communication: No acute overnight or morning events;  vss; Pt did take morning medications; no labs needed; Pt is a full code; Cassidy Gonzalez RN.   Statement Selected

## 2024-01-06 NOTE — NURSING NOTE
"Report to Receiving RN:    Report To: Cristiane  Time Report Called: 1614  Hand-Off Communication: 1.5L removed , BP went up as tx progressed. There are many BP meds \"on hold\" that pt will need when she arrives to the floor. /88, P 93  Complications During Treatment: Yes  Ultrafiltration Treatment: Yes  Medications Administered During Dialysis: No  Blood Products Administered During Dialysis: No  Labs Sent During Dialysis: No  Heparin Drip Rate Changes: N/A        Last Updated: 4:13 PM by ROXY KWOK         "

## 2024-01-06 NOTE — PROGRESS NOTES
Assessment/Plan   53 year old female PMH HFpEF, HTN, ESRD on HD T, TH, S, presenting to the CICU for hypertensive emergency, complaining of one day of dyspnea, chest pain, and constitutional symptoms. Patient is on an extensive home regimen for HTN management and denies missing any doses. She was scheduled for dialysis this morning however came to the ED because she has been feeling poorly.  She was diagnosed with respiratory failure due to volume overload and hypertensive emergency.  She was started on a nicardipine drip and given dialysis on 1/4. The nicardipine was able to be discontinued on the morning of 1/5 and she was restarted on home meds.  Transferred to the medical floor.    #Hypertensive emergency   #HFpEF with exacerbation  #acute hypoxemic respiratory failure due to hypertensive emergency and pulmonary edema  -Weaned off of nicardipine  - evidence of volume overload given pulmonary edema and hx ESRD  -Echo in 6/2023 with EF of 60%   -Home regimen started: valsartan 160 BID, carvedilol 25 BID, amlodipine 10, hydralazine 50 TID.   -Nephrology for volume management  -Will continue to monitor and adjust as necessary, will need to optimize volume status for best pressure control  - Still with evidence of volume overload on exam, will continue to work on weaning oxygen as tolerated    #ESRD on HD  Nephrology consulted for HD    #COPD   -PFTs 7/2022: FEV1/FVC 75.22. Mild obstructive pattern  -Home regimen albuterol nebulizer only   -Will continue with DuoNebs as needed     #Chronic anemia i/s/o renal failure   -Daily CBCs, Hgb on admission 8.3. last hb around 7, will monitor.   -Will continue with weekly aranesp injections as outpatient     Code status: Full code (confirmed on admission)  NOK: Diya Cramer ([phone] - [phone]     Scheduled outpatient appointments in system:   Future Appointments   Date Time Provider Department Center   1/6/2024 12:30 PM HD TELE CHAIR 3 [facility]  "    ---------------------------------------------------------------------------------------------------  Subjective   No events.  Patient is still dyspneic, particularly with any activity.  She also reports her heart rate goes up with little activity.  She is primarily concerned about her breathing but does feel little better since admission.  We discussed plan of care.  When discussed plan for volume management with neurology.  We discussed her vitals and labs.  No other complaints or concerns from patient.     ---------------------------------------------------------------------------------------------------  Objective   Last Recorded Vitals  Blood pressure 155/77, pulse 75, temperature 37 °C (98.6 °F), temperature source Temporal, resp. rate 18, height 1.397 m (4' 7\"), weight 56 kg (123 lb 7.3 oz), SpO2 100 %.  Intake/Output last 3 Shifts:  I/O last 3 completed shifts:  In: 1154.4 (20.6 mL/kg) [P.O.:240; I.V.:514.4 (9.2 mL/kg); Other:400]  Out: 1680 (30 mL/kg) [Other:1680]  Weight: 56 kg     Physical Exam  Constitutional:       General: She is not in acute distress.  HENT:      Head: Normocephalic and atraumatic.      Mouth/Throat:      Mouth: Mucous membranes are moist.      Pharynx: No oropharyngeal exudate or posterior oropharyngeal erythema.   Eyes:      General: No scleral icterus.     Pupils: Pupils are equal, round, and reactive to light.   Cardiovascular:      Rate and Rhythm: Normal rate and regular rhythm.      Heart sounds: No murmur heard.     No friction rub. No gallop.   Pulmonary:      Effort: Pulmonary effort is normal. No respiratory distress.      Breath sounds: Normal breath sounds.   Abdominal:      General: Abdomen is flat. Bowel sounds are normal. There is no distension.      Palpations: Abdomen is soft.      Tenderness: There is no abdominal tenderness.   Musculoskeletal:         General: No swelling or deformity.      Right lower leg: No edema.      Left lower leg: No edema.   Skin:     " General: Skin is warm and dry.      Coloration: Skin is not jaundiced.      Findings: No erythema or rash.   Neurological:      General: No focal deficit present.   Psychiatric:         Mood and Affect: Mood normal.         Judgment: Judgment normal.         Relevant Results  Lab Results   Component Value Date    WBC 4.6 01/06/2024    HGB 7.1 (L) 01/06/2024    HCT 21.5 (L) 01/06/2024    MCV 92 01/06/2024     01/06/2024      Lab Results   Component Value Date    GLUCOSE 85 01/06/2024    CALCIUM 8.7 01/06/2024     01/06/2024    K 4.4 01/06/2024    CO2 28 01/06/2024     01/06/2024    BUN 27 (H) 01/06/2024    CREATININE 6.43 (H) 01/06/2024     Scheduled medications  [MAR Hold] amLODIPine, 10 mg, oral, Daily  [MAR Hold] atorvastatin, 80 mg, oral, Nightly  [MAR Hold] carvedilol, 25 mg, oral, BID  [MAR Hold] gabapentin, 300 mg, oral, Daily  [MAR Hold] heparin (porcine), 5,000 Units, subcutaneous, q8h  [MAR Hold] hydrALAZINE, 100 mg, oral, q8h  [MAR Hold] isosorbide mononitrate ER, 30 mg, oral, Daily  [MAR Hold] multivitamin with minerals, 1 tablet, oral, Daily  [MAR Hold] valsartan, 320 mg, oral, Daily      Continuous medications  niCARdipine, 2.5-15 mg/hr, Last Rate: 2.5 mg/hr (01/05/24 0400)      PRN medications  PRN medications: [MAR Hold] acetaminophen, [MAR Hold] albuterol, [MAR Hold] guaiFENesin, [MAR Hold] hydrOXYzine HCL, [MAR Hold] ipratropium-albuteroL, [MAR Hold] metoclopramide, [MAR Hold] SUMAtriptan    Al Painter MD

## 2024-01-06 NOTE — PROGRESS NOTES
Nephrology Follow-up Note   Patient ID: Stacey Heath is a 53 y.o. female.   Admitted for :   Chief Complaint   Patient presents with    Shortness of Breath      Evaluation of patient on dialysis at [facility]  [address] [address] [id number] on 1/4/2024  Seen and examined during hemodialysis. Labs and events reviewed.      Subjective:   Feels OK, no c/o CP/SOB/F/C/Abd pain  Appetite better     Patient Active Problem List   Diagnosis    Abscess of axillary region    Acute kidney injury superimposed on chronic kidney disease (CMS/HCC)    Anxiety    Astigmatism with presbyopia    Carotid bruit    Chronic migraine with aura    Congenital cataract    Diabetes mellitus type 2, uncomplicated (CMS/HCC)    ESRD (end stage renal disease) on dialysis (CMS/HCC)    Gastritis    Heart failure with preserved left ventricular function (HFpEF) (CMS/[facility])    Hematuria    Hemodialysis patient (CMS/HCC)    High risk HPV infection    Hydronephrosis    Hypertension    Iron deficiency anemia    Low grade squamous intraepith lesion on cytologic smear cervix (lgsil)    Low grade squamous intraepithelial lesion on cytologic smear of cervix (LGSIL)    Dialysis AV fistula malfunction (CMS/HCC)    Migraine    Renal failure    Vaginal dryness    Neuropathy    Nicotine dependence    Pericardial effusion    Productive cough    Shortness of breath    Shoulder pain, bilateral    Sweating abnormality    Transplant       Scheduled medications:  amLODIPine, 10 mg, oral, Daily  atorvastatin, 80 mg, oral, Nightly  carvedilol, 25 mg, oral, BID  gabapentin, 300 mg, oral, Daily  heparin (porcine), 5,000 Units, subcutaneous, q8h  hydrALAZINE, 100 mg, oral, q8h  isosorbide mononitrate ER, 30 mg, oral, Daily  multivitamin with minerals, 1 tablet, oral, Daily  valsartan, 320 mg, oral, Daily         PRN medications: acetaminophen, albuterol, guaiFENesin, hydrOXYzine HCL, ipratropium-albuteroL, metoclopramide, SUMAtriptan      Heart Rate:  [74-96]   Temp:  [35.6 °C (96.1 °F)-37 °C (98.6 °F)]   Resp:  [12-26]   BP: (135-172)/()   Weight:  [56 kg (123 lb 7.3 oz)]   SpO2:  [96 %-100 %]    Weight: 57.6 kg (127 lb)   Gen: alert, NAD  HEENT: NC/AT  Neck: supple, no JVD   Pulm: clear ant b/l   CVS: RRR, no rub  Abd: S/NT/ND  LE: no edema , no cyanosis   Neuro: no asterixis   Dialysis acces:  RUEAVF     Lab Results   Component Value Date    WBC 4.6 01/06/2024    HGB 7.1 (L) 01/06/2024    HCT 21.5 (L) 01/06/2024    MCV 92 01/06/2024     01/06/2024     Lab Results   Component Value Date    GLUCOSE 85 01/06/2024    CALCIUM 8.7 01/06/2024     01/06/2024    K 4.4 01/06/2024    CO2 28 01/06/2024     01/06/2024    BUN 27 (H) 01/06/2024    CREATININE 6.43 (H) 01/06/2024     Results from last 72 hours   Lab Units 01/06/24  0348   ALBUMIN g/dL 3.0*   GLUCOSE mg/dL 85   HEMOGLOBIN g/dL 7.1*   WBC AUTO x10*3/uL 4.6      Results from last 72 hours   Lab Units 01/06/24  0348   SODIUM mmol/L 142   POTASSIUM mmol/L 4.4   CO2 mmol/L 28   BUN mg/dL 27*   CREATININE mg/dL 6.43*   PHOSPHORUS mg/dL 4.7   CALCIUM mg/dL 8.7        Assessment   ESRD-HD admitted with HTN urgency in the setting of missed HD     Plan   Plan HD per submitted orders, UF  2-2.5L as tolerated  MBD - Phosphorus binder: not needed   Anemia of CKD: EPO to be given x 3 per week - nephro will add   Access: no issues   BP: acceptable during treatment   Renal Diet   Daily renal MVI   Continue 3 x per week hemodialysis; SW to ensure availability of o/p HD chair at discharge.me  Haydee Mosher MD MPH

## 2024-01-07 LAB
ERYTHROCYTE [DISTWIDTH] IN BLOOD BY AUTOMATED COUNT: 14.4 % (ref 11.5–14.5)
HCT VFR BLD AUTO: 26.8 % (ref 36–46)
HGB BLD-MCNC: 8.3 G/DL (ref 12–16)
MCH RBC QN AUTO: 30.7 PG (ref 26–34)
MCHC RBC AUTO-ENTMCNC: 31 G/DL (ref 32–36)
MCV RBC AUTO: 99 FL (ref 80–100)
NRBC BLD-RTO: 0 /100 WBCS (ref 0–0)
PLATELET # BLD AUTO: 227 X10*3/UL (ref 150–450)
RBC # BLD AUTO: 2.7 X10*6/UL (ref 4–5.2)
WBC # BLD AUTO: 4.3 X10*3/UL (ref 4.4–11.3)

## 2024-01-07 PROCEDURE — 2500000001 HC RX 250 WO HCPCS SELF ADMINISTERED DRUGS (ALT 637 FOR MEDICARE OP): Performed by: STUDENT IN AN ORGANIZED HEALTH CARE EDUCATION/TRAINING PROGRAM

## 2024-01-07 PROCEDURE — 99233 SBSQ HOSP IP/OBS HIGH 50: CPT | Performed by: STUDENT IN AN ORGANIZED HEALTH CARE EDUCATION/TRAINING PROGRAM

## 2024-01-07 PROCEDURE — 94640 AIRWAY INHALATION TREATMENT: CPT

## 2024-01-07 PROCEDURE — 85027 COMPLETE CBC AUTOMATED: CPT | Performed by: STUDENT IN AN ORGANIZED HEALTH CARE EDUCATION/TRAINING PROGRAM

## 2024-01-07 PROCEDURE — 2500000002 HC RX 250 W HCPCS SELF ADMINISTERED DRUGS (ALT 637 FOR MEDICARE OP, ALT 636 FOR OP/ED): Performed by: STUDENT IN AN ORGANIZED HEALTH CARE EDUCATION/TRAINING PROGRAM

## 2024-01-07 PROCEDURE — 36415 COLL VENOUS BLD VENIPUNCTURE: CPT | Performed by: STUDENT IN AN ORGANIZED HEALTH CARE EDUCATION/TRAINING PROGRAM

## 2024-01-07 PROCEDURE — 94760 N-INVAS EAR/PLS OXIMETRY 1: CPT

## 2024-01-07 PROCEDURE — 1100000001 HC PRIVATE ROOM DAILY

## 2024-01-07 PROCEDURE — 2500000004 HC RX 250 GENERAL PHARMACY W/ HCPCS (ALT 636 FOR OP/ED): Performed by: STUDENT IN AN ORGANIZED HEALTH CARE EDUCATION/TRAINING PROGRAM

## 2024-01-07 RX ORDER — CLONIDINE HYDROCHLORIDE 0.1 MG/1
0.1 TABLET ORAL 2 TIMES DAILY PRN
Status: DISCONTINUED | OUTPATIENT
Start: 2024-01-07 | End: 2024-01-09 | Stop reason: HOSPADM

## 2024-01-07 RX ADMIN — Medication 1 TABLET: at 09:43

## 2024-01-07 RX ADMIN — ISOSORBIDE MONONITRATE 30 MG: 30 TABLET, EXTENDED RELEASE ORAL at 09:44

## 2024-01-07 RX ADMIN — SUMATRIPTAN SUCCINATE 50 MG: 50 TABLET ORAL at 09:44

## 2024-01-07 RX ADMIN — HYDRALAZINE HYDROCHLORIDE 100 MG: 25 TABLET ORAL at 13:16

## 2024-01-07 RX ADMIN — CLONIDINE HYDROCHLORIDE 0.1 MG: 0.1 TABLET ORAL at 06:25

## 2024-01-07 RX ADMIN — CARVEDILOL 25 MG: 25 TABLET, FILM COATED ORAL at 21:18

## 2024-01-07 RX ADMIN — ACETAMINOPHEN 975 MG: 325 TABLET ORAL at 06:18

## 2024-01-07 RX ADMIN — IPRATROPIUM BROMIDE AND ALBUTEROL SULFATE 3 ML: .5; 3 SOLUTION RESPIRATORY (INHALATION) at 22:03

## 2024-01-07 RX ADMIN — AMLODIPINE BESYLATE 10 MG: 10 TABLET ORAL at 09:43

## 2024-01-07 RX ADMIN — HYDRALAZINE HYDROCHLORIDE 100 MG: 25 TABLET ORAL at 05:18

## 2024-01-07 RX ADMIN — GABAPENTIN 300 MG: 300 CAPSULE ORAL at 22:35

## 2024-01-07 RX ADMIN — ACETAMINOPHEN 975 MG: 325 TABLET ORAL at 21:21

## 2024-01-07 RX ADMIN — VALSARTAN 320 MG: 160 TABLET, FILM COATED ORAL at 09:44

## 2024-01-07 RX ADMIN — HYDRALAZINE HYDROCHLORIDE 100 MG: 25 TABLET ORAL at 21:18

## 2024-01-07 RX ADMIN — HEPARIN SODIUM 5000 UNITS: 5000 INJECTION INTRAVENOUS; SUBCUTANEOUS at 21:18

## 2024-01-07 RX ADMIN — ATORVASTATIN CALCIUM 80 MG: 80 TABLET, FILM COATED ORAL at 21:18

## 2024-01-07 RX ADMIN — ACETAMINOPHEN 975 MG: 325 TABLET ORAL at 13:08

## 2024-01-07 RX ADMIN — CARVEDILOL 25 MG: 25 TABLET, FILM COATED ORAL at 09:43

## 2024-01-07 RX ADMIN — HEPARIN SODIUM 5000 UNITS: 5000 INJECTION INTRAVENOUS; SUBCUTANEOUS at 13:09

## 2024-01-07 ASSESSMENT — PAIN DESCRIPTION - LOCATION
LOCATION: HAND
LOCATION: HAND

## 2024-01-07 ASSESSMENT — PAIN - FUNCTIONAL ASSESSMENT
PAIN_FUNCTIONAL_ASSESSMENT: 0-10

## 2024-01-07 ASSESSMENT — PAIN DESCRIPTION - DESCRIPTORS: DESCRIPTORS: ACHING

## 2024-01-07 ASSESSMENT — PAIN SCALES - GENERAL
PAINLEVEL_OUTOF10: 7
PAINLEVEL_OUTOF10: 7
PAINLEVEL_OUTOF10: 6
PAINLEVEL_OUTOF10: 7
PAINLEVEL_OUTOF10: 8

## 2024-01-07 ASSESSMENT — PAIN DESCRIPTION - ORIENTATION
ORIENTATION: RIGHT;LEFT
ORIENTATION: LEFT;RIGHT

## 2024-01-07 NOTE — PROGRESS NOTES
Assessment/Plan   53 year old female PMH HFpEF, HTN, ESRD on HD T, TH, S, presenting to the CICU for hypertensive emergency, complaining of one day of dyspnea, chest pain, and constitutional symptoms. Patient is on an extensive home regimen for HTN management and denies missing any doses. She was scheduled for dialysis this morning however came to the ED because she has been feeling poorly.  She was diagnosed with respiratory failure due to volume overload and hypertensive emergency.  She was started on a nicardipine drip and given dialysis on 1/4. The nicardipine was able to be discontinued on the morning of 1/5 and she was restarted on home meds.  Transferred to the medical floor.    #Hypertensive emergency, resolved, continues to be hypertensive  #HFpEF with exacerbation  #acute hypoxemic respiratory failure due to hypertensive emergency and pulmonary edema  - Weaned off of nicardipine  - evidence of volume overload given pulmonary edema and hx ESRD  -Echo in 6/2023 with EF of 60%   -Home regimen started: valsartan 160 BID, carvedilol 25 BID, amlodipine 10, hydralazine 50 TID. Nephrology for volume management. Reassess bp after home meds resumed and volume removed.  There was delay in starting her home meds on transfer from ICU to floor.   - Still with evidence of volume overload on exam, will continue to work on weaning oxygen with her volume removal/HD sessions as tolerated.     #ESRD on HD  Nephrology consulted for HD    #COPD   -PFTs 7/2022: FEV1/FVC 75.22. Mild obstructive pattern  -Home regimen albuterol nebulizer only   -Will continue with DuoNebs as needed     #Chronic anemia i/s/o renal failure   -Daily CBCs, Hgb on admission 8.3. last hb around 7, will monitor.   -Will continue with weekly aranesp injections as outpatient     Code status: Full code (confirmed on admission)  NOK: Daughter, Diya Jang ([id number]) - [phone]     Scheduled outpatient appointments in system:   No future  "appointments.    ---------------------------------------------------------------------------------------------------  Subjective   No events.  No events, nephrology had wanted to take off more fluid than patient would allow.  Patient reports she cannot tolerate more than 2 L in a session, only had 1.5 L removed due to cramping.  Overall breathing is improving but she is still on oxygen.  She is mobile around the room independently.  No other issues reported.     ---------------------------------------------------------------------------------------------------  Objective   Last Recorded Vitals  Blood pressure 133/68, pulse 86, temperature 36.8 °C (98.2 °F), resp. rate 16, height 1.397 m (4' 7\"), weight 56 kg (123 lb 7.3 oz), SpO2 94 %.  Intake/Output last 3 Shifts:  I/O last 3 completed shifts:  In: 1880 (33.6 mL/kg) [P.O.:480; I.V.:600 (10.7 mL/kg); Other:800]  Out: 3732 (66.6 mL/kg) [Other:3732]  Weight: 56 kg     Physical Exam  Constitutional:       General: She is not in acute distress.     Comments: On nasal cannula   HENT:      Head: Normocephalic and atraumatic.      Mouth/Throat:      Mouth: Mucous membranes are moist.      Pharynx: No oropharyngeal exudate or posterior oropharyngeal erythema.   Eyes:      General: No scleral icterus.     Pupils: Pupils are equal, round, and reactive to light.   Cardiovascular:      Rate and Rhythm: Normal rate and regular rhythm.      Heart sounds: No murmur heard.     No friction rub. No gallop.   Pulmonary:      Effort: Pulmonary effort is normal. No respiratory distress.      Breath sounds: Normal breath sounds.      Comments: Diminished at bases with some rales  Abdominal:      General: Abdomen is flat. Bowel sounds are normal. There is no distension.      Palpations: Abdomen is soft.      Tenderness: There is no abdominal tenderness.   Musculoskeletal:         General: No swelling or deformity.      Right lower leg: No edema.      Left lower leg: No edema.   Skin:     " General: Skin is warm and dry.      Coloration: Skin is not jaundiced.      Findings: No erythema or rash.   Neurological:      General: No focal deficit present.   Psychiatric:         Mood and Affect: Mood normal.         Judgment: Judgment normal.         Relevant Results  Lab Results   Component Value Date    WBC 4.6 01/06/2024    HGB 7.1 (L) 01/06/2024    HCT 21.5 (L) 01/06/2024    MCV 92 01/06/2024     01/06/2024      Lab Results   Component Value Date    GLUCOSE 85 01/06/2024    CALCIUM 8.7 01/06/2024     01/06/2024    K 4.4 01/06/2024    CO2 28 01/06/2024     01/06/2024    BUN 27 (H) 01/06/2024    CREATININE 6.43 (H) 01/06/2024     Scheduled medications  amLODIPine, 10 mg, oral, Daily  atorvastatin, 80 mg, oral, Nightly  carvedilol, 25 mg, oral, BID  gabapentin, 300 mg, oral, Daily  heparin (porcine), 5,000 Units, subcutaneous, q8h  hydrALAZINE, 100 mg, oral, q8h  isosorbide mononitrate ER, 30 mg, oral, Daily  multivitamin with minerals, 1 tablet, oral, Daily  valsartan, 320 mg, oral, Daily      Continuous medications       PRN medications  PRN medications: acetaminophen, albuterol, cloNIDine, guaiFENesin, hydrOXYzine HCL, ipratropium-albuteroL, metoclopramide, SUMAtriptan    Al Painter MD

## 2024-01-07 NOTE — CARE PLAN
The clinical goals for the shift include Pt will remain safe and free from injury throughout shift.      Problem: Pain - Adult  Goal: Verbalizes/displays adequate comfort level or baseline comfort level  Outcome: Progressing     Problem: Safety - Adult  Goal: Free from fall injury  Outcome: Progressing     Problem: Discharge Planning  Goal: Discharge to home or other facility with appropriate resources  Outcome: Progressing     Problem: Chronic Conditions and Co-morbidities  Goal: Patient's chronic conditions and co-morbidity symptoms are monitored and maintained or improved  Outcome: Progressing     Problem: Diabetes  Goal: Achieve decreasing blood glucose levels by end of shift  Outcome: Progressing  Goal: Increase stability of blood glucose readings by end of shift  Outcome: Progressing  Goal: Decrease in ketones present in urine by end of shift  Outcome: Progressing  Goal: Maintain electrolyte levels within acceptable range throughout shift  Outcome: Progressing  Goal: Maintain glucose levels >70mg/dl to <250mg/dl throughout shift  Outcome: Progressing  Goal: No changes in neurological exam by end of shift  Outcome: Progressing  Goal: Learn about and adhere to nutrition recommendations by end of shift  Outcome: Progressing  Goal: Vital signs within normal range for age by end of shift  Outcome: Progressing  Goal: Increase self care and/or family involovement by end of shift  Outcome: Progressing  Goal: Receive DSME education by end of shift  Outcome: Progressing     Problem: Pain  Goal: Takes deep breaths with improved pain control throughout the shift  Outcome: Progressing  Goal: Turns in bed with improved pain control throughout the shift  Outcome: Progressing  Goal: Walks with improved pain control throughout the shift  Outcome: Progressing  Goal: Performs ADL's with improved pain control throughout shift  Outcome: Progressing  Goal: Participates in PT with improved pain control throughout the shift  Outcome:  Progressing  Goal: Free from opioid side effects throughout the shift  Outcome: Progressing  Goal: Free from acute confusion related to pain meds throughout the shift  Outcome: Progressing     Problem: Skin  Goal: Prevent/minimize sheer/friction injuries  Outcome: Progressing

## 2024-01-07 NOTE — CARE PLAN
The patient's goals for the shift include      The clinical goals for the shift include Pt will remain safe and free from injury throughout shift.    Problem: Pain - Adult  Goal: Verbalizes/displays adequate comfort level or baseline comfort level  Outcome: Progressing     Problem: Safety - Adult  Goal: Free from fall injury  Outcome: Progressing     Problem: Discharge Planning  Goal: Discharge to home or other facility with appropriate resources  Outcome: Progressing     Problem: Chronic Conditions and Co-morbidities  Goal: Patient's chronic conditions and co-morbidity symptoms are monitored and maintained or improved  Outcome: Progressing     Problem: Diabetes  Goal: Achieve decreasing blood glucose levels by end of shift  Outcome: Progressing  Goal: Increase stability of blood glucose readings by end of shift  Outcome: Progressing  Goal: Decrease in ketones present in urine by end of shift  Outcome: Progressing  Goal: Maintain electrolyte levels within acceptable range throughout shift  Outcome: Progressing  Goal: Maintain glucose levels >70mg/dl to <250mg/dl throughout shift  Outcome: Progressing  Goal: No changes in neurological exam by end of shift  Outcome: Progressing  Goal: Learn about and adhere to nutrition recommendations by end of shift  Outcome: Progressing  Goal: Vital signs within normal range for age by end of shift  Outcome: Progressing  Goal: Increase self care and/or family involovement by end of shift  Outcome: Progressing  Goal: Receive DSME education by end of shift  Outcome: Progressing

## 2024-01-07 NOTE — PROGRESS NOTES
"Stacey Heath is a 53 y.o. female on day 3 of admission presenting with ESRD (end stage renal disease) on dialysis (CMS/[facility]).  Met with patient to introduce myself, role and discuss discharge planning.  Patient lives with her spouse.  Independent in all ADL's.  Requires no assist devices for mobility.  Patient denies active home care or home care needs.  Patient will have a ride home when medically stable.  Patient HD [facility] T,Th,Sat.  Patient expressed need for transportation to HD.  Will continue to monitor patient for all home going needs.    Pcp.  Dr. Carroll Medina last seen 10/23  Pharmacy:  [facility]  DME: N/A  Home Crae: N/A      Physical Exam    Last Recorded Vitals  Blood pressure 133/68, pulse 86, temperature 36.8 °C (98.2 °F), resp. rate 16, height 1.397 m (4' 7\"), weight 56 kg (123 lb 7.3 oz), SpO2 94 %.  Intake/Output last 3 Shifts:  I/O last 3 completed shifts:  In: 1880 (33.6 mL/kg) [P.O.:480; I.V.:600 (10.7 mL/kg); Other:800]  Out: 3732 (66.6 mL/kg) [Other:3732]  Weight: 56 kg           Assessment/Plan   Principal Problem:    ESRD (end stage renal disease) on dialysis (CMS/[facility])          Arlette Winters RN      "

## 2024-01-08 ENCOUNTER — HOME HEALTH ADMISSION (OUTPATIENT)
Dept: HOME HEALTH SERVICES | Facility: HOME HEALTH | Age: 54
End: 2024-01-08
Payer: COMMERCIAL

## 2024-01-08 LAB
ALBUMIN SERPL BCP-MCNC: 3.8 G/DL (ref 3.4–5)
ANION GAP SERPL CALC-SCNC: 17 MMOL/L (ref 10–20)
BUN SERPL-MCNC: 41 MG/DL (ref 6–23)
CALCIUM SERPL-MCNC: 9.8 MG/DL (ref 8.6–10.6)
CHLORIDE SERPL-SCNC: 98 MMOL/L (ref 98–107)
CO2 SERPL-SCNC: 24 MMOL/L (ref 21–32)
CREAT SERPL-MCNC: 6.99 MG/DL (ref 0.5–1.05)
EGFRCR SERPLBLD CKD-EPI 2021: 7 ML/MIN/1.73M*2
ERYTHROCYTE [DISTWIDTH] IN BLOOD BY AUTOMATED COUNT: 14.2 % (ref 11.5–14.5)
GLUCOSE BLD MANUAL STRIP-MCNC: 126 MG/DL (ref 74–99)
GLUCOSE SERPL-MCNC: 80 MG/DL (ref 74–99)
HCT VFR BLD AUTO: 27.7 % (ref 36–46)
HGB BLD-MCNC: 8.5 G/DL (ref 12–16)
MCH RBC QN AUTO: 30.2 PG (ref 26–34)
MCHC RBC AUTO-ENTMCNC: 30.7 G/DL (ref 32–36)
MCV RBC AUTO: 99 FL (ref 80–100)
NRBC BLD-RTO: 0 /100 WBCS (ref 0–0)
PHOSPHATE SERPL-MCNC: 3.9 MG/DL (ref 2.5–4.9)
PLATELET # BLD AUTO: 263 X10*3/UL (ref 150–450)
POTASSIUM SERPL-SCNC: 5.3 MMOL/L (ref 3.5–5.3)
RBC # BLD AUTO: 2.81 X10*6/UL (ref 4–5.2)
SODIUM SERPL-SCNC: 134 MMOL/L (ref 136–145)
WBC # BLD AUTO: 5.2 X10*3/UL (ref 4.4–11.3)

## 2024-01-08 PROCEDURE — 2500000001 HC RX 250 WO HCPCS SELF ADMINISTERED DRUGS (ALT 637 FOR MEDICARE OP): Performed by: STUDENT IN AN ORGANIZED HEALTH CARE EDUCATION/TRAINING PROGRAM

## 2024-01-08 PROCEDURE — 1100000001 HC PRIVATE ROOM DAILY

## 2024-01-08 PROCEDURE — 85027 COMPLETE CBC AUTOMATED: CPT | Performed by: STUDENT IN AN ORGANIZED HEALTH CARE EDUCATION/TRAINING PROGRAM

## 2024-01-08 PROCEDURE — 80069 RENAL FUNCTION PANEL: CPT | Performed by: STUDENT IN AN ORGANIZED HEALTH CARE EDUCATION/TRAINING PROGRAM

## 2024-01-08 PROCEDURE — 2500000004 HC RX 250 GENERAL PHARMACY W/ HCPCS (ALT 636 FOR OP/ED): Performed by: STUDENT IN AN ORGANIZED HEALTH CARE EDUCATION/TRAINING PROGRAM

## 2024-01-08 PROCEDURE — 99232 SBSQ HOSP IP/OBS MODERATE 35: CPT | Performed by: STUDENT IN AN ORGANIZED HEALTH CARE EDUCATION/TRAINING PROGRAM

## 2024-01-08 PROCEDURE — 82947 ASSAY GLUCOSE BLOOD QUANT: CPT

## 2024-01-08 PROCEDURE — 99233 SBSQ HOSP IP/OBS HIGH 50: CPT | Performed by: NURSE PRACTITIONER

## 2024-01-08 PROCEDURE — 36415 COLL VENOUS BLD VENIPUNCTURE: CPT | Performed by: STUDENT IN AN ORGANIZED HEALTH CARE EDUCATION/TRAINING PROGRAM

## 2024-01-08 RX ADMIN — CARVEDILOL 25 MG: 25 TABLET, FILM COATED ORAL at 09:26

## 2024-01-08 RX ADMIN — AMLODIPINE BESYLATE 10 MG: 10 TABLET ORAL at 09:26

## 2024-01-08 RX ADMIN — Medication 1 TABLET: at 09:26

## 2024-01-08 RX ADMIN — ISOSORBIDE MONONITRATE 30 MG: 30 TABLET, EXTENDED RELEASE ORAL at 09:26

## 2024-01-08 RX ADMIN — ACETAMINOPHEN 975 MG: 325 TABLET ORAL at 21:34

## 2024-01-08 RX ADMIN — HEPARIN SODIUM 5000 UNITS: 5000 INJECTION INTRAVENOUS; SUBCUTANEOUS at 21:27

## 2024-01-08 RX ADMIN — HEPARIN SODIUM 5000 UNITS: 5000 INJECTION INTRAVENOUS; SUBCUTANEOUS at 14:59

## 2024-01-08 RX ADMIN — CLONIDINE HYDROCHLORIDE 0.1 MG: 0.1 TABLET ORAL at 00:24

## 2024-01-08 RX ADMIN — HEPARIN SODIUM 5000 UNITS: 5000 INJECTION INTRAVENOUS; SUBCUTANEOUS at 05:29

## 2024-01-08 RX ADMIN — SUMATRIPTAN SUCCINATE 50 MG: 50 TABLET ORAL at 00:24

## 2024-01-08 RX ADMIN — GABAPENTIN 300 MG: 300 CAPSULE ORAL at 21:27

## 2024-01-08 RX ADMIN — CARVEDILOL 25 MG: 25 TABLET, FILM COATED ORAL at 21:26

## 2024-01-08 RX ADMIN — HYDRALAZINE HYDROCHLORIDE 100 MG: 25 TABLET ORAL at 05:29

## 2024-01-08 RX ADMIN — VALSARTAN 320 MG: 160 TABLET, FILM COATED ORAL at 09:26

## 2024-01-08 RX ADMIN — ATORVASTATIN CALCIUM 80 MG: 80 TABLET, FILM COATED ORAL at 21:26

## 2024-01-08 RX ADMIN — HYDRALAZINE HYDROCHLORIDE 100 MG: 25 TABLET ORAL at 14:59

## 2024-01-08 RX ADMIN — HYDRALAZINE HYDROCHLORIDE 100 MG: 25 TABLET ORAL at 21:26

## 2024-01-08 ASSESSMENT — PAIN SCALES - GENERAL
PAINLEVEL_OUTOF10: 5 - MODERATE PAIN
PAINLEVEL_OUTOF10: 7
PAINLEVEL_OUTOF10: 0 - NO PAIN

## 2024-01-08 ASSESSMENT — PAIN DESCRIPTION - LOCATION: LOCATION: HEAD

## 2024-01-08 ASSESSMENT — COGNITIVE AND FUNCTIONAL STATUS - GENERAL
MOBILITY SCORE: 24
MOBILITY SCORE: 24
DAILY ACTIVITIY SCORE: 24
DAILY ACTIVITIY SCORE: 24

## 2024-01-08 NOTE — PROGRESS NOTES
"Stacey Heath is a 53 y.o. female on day 4 of admission presenting with ESRD (end stage renal disease) on dialysis (CMS/[facility]).    Subjective   Pt resting in bed. Denies sob (mostly sob when up and about), \"breathing tx's are ok\",   n/v/d, tiredness, constipation, fever, cough, chills, pain, chest pains., feels like too much fluid being removed       Objective     Physical Exam  Vitals and nursing note reviewed.   Constitutional:       Appearance: Normal appearance. She is obese.   Cardiovascular:      Rate and Rhythm: Normal rate and regular rhythm.   Pulmonary:      Comments: M[place]in[place] lung sounds dim  O2 2Lnc  Encourage IS  Abdominal:      Comments: Slight dist-non tender to palpation   Genitourinary:     Comments: States make urine  Musculoskeletal:      Comments: Ble without edema   Skin:     General: Skin is warm and dry.   Neurological:      Mental Status: She is alert and oriented to person, place, and time.   Psychiatric:         Mood and Affect: Mood normal.         Behavior: Behavior normal.         Last Recorded Vitals  Blood pressure 145/61, pulse 87, temperature 36.7 °C (98.1 °F), resp. rate 20, height 1.397 m (4' 7\"), weight 56.3 kg (124 lb 3.2 oz), SpO2 94 %.  Intake/Output last 3 Shifts:  No intake/output data recorded.    Relevant Results    Scheduled medications  amLODIPine, 10 mg, oral, Daily  atorvastatin, 80 mg, oral, Nightly  carvedilol, 25 mg, oral, BID  [START ON 1/9/2024] epoetin joceline or biosimilar, 10,000 Units, intravenous, Once per day on Tue Thu Sat  gabapentin, 300 mg, oral, Daily  heparin (porcine), 5,000 Units, subcutaneous, q8h  hydrALAZINE, 100 mg, oral, q8h  isosorbide mononitrate ER, 30 mg, oral, Daily  multivitamin with minerals, 1 tablet, oral, Daily  valsartan, 320 mg, oral, Daily      Continuous medications     PRN medications  PRN medications: acetaminophen, albuterol, cloNIDine, guaiFENesin, hydrOXYzine HCL, ipratropium-albuteroL, metoclopramide, SUMAtriptan       Results for " orders placed or performed during the hospital encounter of 01/04/24 (from the past 24 hour(s))   Renal Function Panel   Result Value Ref Range    Glucose 80 74 - 99 mg/dL    Sodium 134 (L) 136 - 145 mmol/L    Potassium 5.3 3.5 - 5.3 mmol/L    Chloride 98 98 - 107 mmol/L    Bicarbonate 24 21 - 32 mmol/L    Anion Gap 17 10 - 20 mmol/L    Urea Nitrogen 41 (H) 6 - 23 mg/dL    Creatinine 6.99 (H) 0.50 - 1.05 mg/dL    eGFR 7 (L) >60 mL/min/1.73m*2    Calcium 9.8 8.6 - 10.6 mg/dL    Phosphorus 3.9 2.5 - 4.9 mg/dL    Albumin 3.8 3.4 - 5.0 g/dL   CBC   Result Value Ref Range    WBC 5.2 4.4 - 11.3 x10*3/uL    nRBC 0.0 0.0 - 0.0 /100 WBCs    RBC 2.81 (L) 4.00 - 5.20 x10*6/uL    Hemoglobin 8.5 (L) 12.0 - 16.0 g/dL    Hematocrit 27.7 (L) 36.0 - 46.0 %    MCV 99 80 - 100 fL    MCH 30.2 26.0 - 34.0 pg    MCHC 30.7 (L) 32.0 - 36.0 g/dL    RDW 14.2 11.5 - 14.5 %    Platelets 263 150 - 450 x10*3/uL             Assessment/Plan   Principal Problem:    ESRD (end stage renal disease) on dialysis (CMS/[facility])    Tolerated hemodialysis Saturday with net fluid loss 1500cc. Attending will assess fluid removal    Bp stable with tx, appears euvolemic on exam and has stable electrolytes . K+=5.3     Outpatient Dialysis schedule:   [facility]/Dr. Garcia     Access: -rt fist with +thrill/bruit- no issues - able to achieve      Anemia of ESRD:   1/8-epoetin joceline (Epogen,Procrit) injection 10,000 Units on dialysis days..current hgb 8.5... will cont to monitor     CKD-MBD:  not on Phosphate Binder:multivitamin with minerals 1 tablet  daily     Plan HD tomorrow with UF as tolerated     Renal diet      Please obtain daily standing wt (if possible)     Medication to be adjusted for ESRD      Patient to continue regular HD schedule while inpatient and to follow with the outpatient nephrologist at discharge        LILLY Rebollar-CNP

## 2024-01-08 NOTE — PROGRESS NOTES
Assessment/Plan   Stacey Heath is a 53 year old female PMH HFpEF, HTN, ESRD on HD T, TH, S, presenting to the CICU for hypertensive emergency, complaining of one day of dyspnea, chest pain, and constitutional symptoms. Patient is on an extensive home regimen for HTN management and denies missing any doses. She was scheduled for dialysis this morning however came to the ED because she has been feeling poorly.  She was diagnosed with respiratory failure due to volume overload and hypertensive emergency.  She was started on a nicardipine drip and given dialysis on 1/4. The nicardipine was able to be discontinued on the morning of 1/5 and she was restarted on home meds.     - BP has been labile.  But better controlled when she is on her meds.  Just needs more volume removal for oxygen weaning and for better BP control.  - Discussed with nephro plans as patient is limiting how much volume can be removed with each section states he cannot tolerate more than 2 L, had 1.5 yesterday despite nephrology wanting to take off 3 L.  So this is going to delay her recovery.  When she is off oxygen she can discharge to outpatient follow-up to continue to work on her volume management.  - Dispo is home        #Hypertensive emergency, resolved, continues to be hypertensive  #HFpEF with exacerbation  #acute hypoxemic respiratory failure due to hypertensive emergency and pulmonary edema  - Weaned off of nicardipine  - evidence of volume overload given pulmonary edema and hx ESRD  -Echo in 6/2023 with EF of 60%   -Home regimen started: valsartan 160 BID, carvedilol 25 BID, amlodipine 10, hydralazine 50 TID. Nephrology for volume management. Reassess bp after home meds resumed and volume removed.  There was delay in starting her home meds on transfer from ICU to floor.   - Still with evidence of volume overload on exam, will continue to work on weaning oxygen with her volume removal/HD sessions as tolerated.     #ESRD on HD  Nephrology  "consulted for HD    #COPD   -PFTs 7/2022: FEV1/FVC 75.22. Mild obstructive pattern  -Home regimen albuterol nebulizer only   -Will continue with DuoNebs as needed     #Chronic anemia i/s/o renal failure   -Daily CBCs, Hgb on admission 8.3. last hb around 7, will monitor.   -Will continue with weekly aranesp injections as outpatient     Code status: Full code (confirmed on admission)  NOK: DaughterDiya ([phone]) - [phone]     Scheduled outpatient appointments in system:   No future appointments.    ---------------------------------------------------------------------------------------------------  Subjective   No events.        ---------------------------------------------------------------------------------------------------  Objective   Last Recorded Vitals  Blood pressure 153/68, pulse 87, temperature 36.2 °C (97.2 °F), resp. rate 20, height 1.397 m (4' 7\"), weight 56.3 kg (124 lb 3.2 oz), SpO2 97 %.  Intake/Output last 3 Shifts:  No intake/output data recorded.    Physical Exam  Constitutional:       General: She is not in acute distress.     Comments: On nasal cannula   HENT:      Head: Normocephalic and atraumatic.      Mouth/Throat:      Mouth: Mucous membranes are moist.      Pharynx: No oropharyngeal exudate or posterior oropharyngeal erythema.   Eyes:      General: No scleral icterus.     Pupils: Pupils are equal, round, and reactive to light.   Cardiovascular:      Rate and Rhythm: Normal rate and regular rhythm.      Heart sounds: No murmur heard.     No friction rub. No gallop.   Pulmonary:      Effort: Pulmonary effort is normal. No respiratory distress.      Breath sounds: Normal breath sounds.      Comments: Diminished at bases with some rales  Abdominal:      General: Abdomen is flat. Bowel sounds are normal. There is no distension.      Palpations: Abdomen is soft.      Tenderness: There is no abdominal tenderness.   Musculoskeletal:         General: No swelling or deformity.      Right " lower leg: No edema.      Left lower leg: No edema.   Skin:     General: Skin is warm and dry.      Coloration: Skin is not jaundiced.      Findings: No erythema or rash.   Neurological:      General: No focal deficit present.   Psychiatric:         Mood and Affect: Mood normal.         Judgment: Judgment normal.         Relevant Results  Lab Results   Component Value Date    WBC 5.2 01/08/2024    HGB 8.5 (L) 01/08/2024    HCT 27.7 (L) 01/08/2024    MCV 99 01/08/2024     01/08/2024      Lab Results   Component Value Date    GLUCOSE 80 01/08/2024    CALCIUM 9.8 01/08/2024     (L) 01/08/2024    K 5.3 01/08/2024    CO2 24 01/08/2024    CL 98 01/08/2024    BUN 41 (H) 01/08/2024    CREATININE 6.99 (H) 01/08/2024     Scheduled medications  amLODIPine, 10 mg, oral, Daily  atorvastatin, 80 mg, oral, Nightly  carvedilol, 25 mg, oral, BID  [START ON 1/9/2024] epoetin joceline or biosimilar, 10,000 Units, intravenous, Once per day on Tue Thu Sat  gabapentin, 300 mg, oral, Daily  heparin (porcine), 5,000 Units, subcutaneous, q8h  hydrALAZINE, 100 mg, oral, q8h  isosorbide mononitrate ER, 30 mg, oral, Daily  multivitamin with minerals, 1 tablet, oral, Daily  valsartan, 320 mg, oral, Daily      Continuous medications       PRN medications  PRN medications: acetaminophen, albuterol, cloNIDine, guaiFENesin, hydrOXYzine HCL, ipratropium-albuteroL, metoclopramide, SUMAtriptan    Soniya Ruff MD

## 2024-01-09 ENCOUNTER — APPOINTMENT (OUTPATIENT)
Dept: DIALYSIS | Facility: HOSPITAL | Age: 54
End: 2024-01-09
Payer: COMMERCIAL

## 2024-01-09 ENCOUNTER — PHARMACY VISIT (OUTPATIENT)
Dept: PHARMACY | Facility: CLINIC | Age: 54
End: 2024-01-09
Payer: MEDICARE

## 2024-01-09 ENCOUNTER — DOCUMENTATION (OUTPATIENT)
Dept: HOME HEALTH SERVICES | Facility: HOME HEALTH | Age: 54
End: 2024-01-09
Payer: COMMERCIAL

## 2024-01-09 VITALS
HEART RATE: 88 BPM | TEMPERATURE: 97.3 F | HEIGHT: 55 IN | WEIGHT: 127.21 LBS | RESPIRATION RATE: 20 BRPM | BODY MASS INDEX: 29.44 KG/M2 | DIASTOLIC BLOOD PRESSURE: 81 MMHG | SYSTOLIC BLOOD PRESSURE: 187 MMHG | OXYGEN SATURATION: 100 %

## 2024-01-09 LAB
ALBUMIN SERPL BCP-MCNC: 3.4 G/DL (ref 3.4–5)
ANION GAP SERPL CALC-SCNC: 17 MMOL/L (ref 10–20)
BUN SERPL-MCNC: 56 MG/DL (ref 6–23)
CALCIUM SERPL-MCNC: 9.7 MG/DL (ref 8.6–10.6)
CHLORIDE SERPL-SCNC: 97 MMOL/L (ref 98–107)
CO2 SERPL-SCNC: 24 MMOL/L (ref 21–32)
CREAT SERPL-MCNC: 8.81 MG/DL (ref 0.5–1.05)
EGFRCR SERPLBLD CKD-EPI 2021: 5 ML/MIN/1.73M*2
ERYTHROCYTE [DISTWIDTH] IN BLOOD BY AUTOMATED COUNT: 14.4 % (ref 11.5–14.5)
GLUCOSE SERPL-MCNC: 88 MG/DL (ref 74–99)
HCT VFR BLD AUTO: 25.5 % (ref 36–46)
HGB BLD-MCNC: 8.1 G/DL (ref 12–16)
MCH RBC QN AUTO: 30.8 PG (ref 26–34)
MCHC RBC AUTO-ENTMCNC: 31.8 G/DL (ref 32–36)
MCV RBC AUTO: 97 FL (ref 80–100)
NRBC BLD-RTO: 0 /100 WBCS (ref 0–0)
PHOSPHATE SERPL-MCNC: 4.1 MG/DL (ref 2.5–4.9)
PLATELET # BLD AUTO: 257 X10*3/UL (ref 150–450)
POTASSIUM SERPL-SCNC: 5.3 MMOL/L (ref 3.5–5.3)
RBC # BLD AUTO: 2.63 X10*6/UL (ref 4–5.2)
SODIUM SERPL-SCNC: 133 MMOL/L (ref 136–145)
WBC # BLD AUTO: 6.5 X10*3/UL (ref 4.4–11.3)

## 2024-01-09 PROCEDURE — 90935 HEMODIALYSIS ONE EVALUATION: CPT | Performed by: INTERNAL MEDICINE

## 2024-01-09 PROCEDURE — 8010000001 HC DIALYSIS - HEMODIALYSIS PER DAY

## 2024-01-09 PROCEDURE — 2500000001 HC RX 250 WO HCPCS SELF ADMINISTERED DRUGS (ALT 637 FOR MEDICARE OP): Performed by: STUDENT IN AN ORGANIZED HEALTH CARE EDUCATION/TRAINING PROGRAM

## 2024-01-09 PROCEDURE — 36415 COLL VENOUS BLD VENIPUNCTURE: CPT | Performed by: STUDENT IN AN ORGANIZED HEALTH CARE EDUCATION/TRAINING PROGRAM

## 2024-01-09 PROCEDURE — 80069 RENAL FUNCTION PANEL: CPT | Performed by: STUDENT IN AN ORGANIZED HEALTH CARE EDUCATION/TRAINING PROGRAM

## 2024-01-09 PROCEDURE — 85027 COMPLETE CBC AUTOMATED: CPT | Performed by: STUDENT IN AN ORGANIZED HEALTH CARE EDUCATION/TRAINING PROGRAM

## 2024-01-09 PROCEDURE — RXMED WILLOW AMBULATORY MEDICATION CHARGE

## 2024-01-09 PROCEDURE — 2500000004 HC RX 250 GENERAL PHARMACY W/ HCPCS (ALT 636 FOR OP/ED): Performed by: STUDENT IN AN ORGANIZED HEALTH CARE EDUCATION/TRAINING PROGRAM

## 2024-01-09 PROCEDURE — 99239 HOSP IP/OBS DSCHRG MGMT >30: CPT | Performed by: STUDENT IN AN ORGANIZED HEALTH CARE EDUCATION/TRAINING PROGRAM

## 2024-01-09 RX ORDER — FLUTICASONE PROPIONATE AND SALMETEROL 100; 50 UG/1; UG/1
1 POWDER RESPIRATORY (INHALATION) DAILY
Qty: 60 EACH | Refills: 0 | Status: SHIPPED | OUTPATIENT
Start: 2024-01-09 | End: 2024-07-01 | Stop reason: HOSPADM

## 2024-01-09 RX ORDER — PANTOPRAZOLE SODIUM 40 MG/1
40 TABLET, DELAYED RELEASE ORAL DAILY
Qty: 30 TABLET | Refills: 0 | Status: SHIPPED | OUTPATIENT
Start: 2024-01-09 | End: 2024-04-24 | Stop reason: SDUPTHER

## 2024-01-09 RX ORDER — GABAPENTIN 300 MG/1
300 CAPSULE ORAL NIGHTLY
Qty: 30 CAPSULE | Refills: 0 | Status: SHIPPED | OUTPATIENT
Start: 2024-01-09 | End: 2024-04-24 | Stop reason: SDUPTHER

## 2024-01-09 RX ORDER — CLONIDINE HYDROCHLORIDE 0.1 MG/1
0.1 TABLET ORAL 2 TIMES DAILY
Qty: 60 TABLET | Refills: 0 | Status: SHIPPED | OUTPATIENT
Start: 2024-01-09 | End: 2024-02-12 | Stop reason: SDUPTHER

## 2024-01-09 RX ORDER — BISMUTH SUBSALICYLATE 262 MG
1 TABLET,CHEWABLE ORAL DAILY
Qty: 30 TABLET | Refills: 0 | Status: SHIPPED | OUTPATIENT
Start: 2024-01-09 | End: 2024-02-08

## 2024-01-09 RX ORDER — SUMATRIPTAN SUCCINATE 50 MG/1
50 TABLET ORAL ONCE AS NEEDED
Qty: 30 TABLET | Refills: 3 | Status: SHIPPED | OUTPATIENT
Start: 2024-01-09 | End: 2024-05-31 | Stop reason: HOSPADM

## 2024-01-09 RX ORDER — ATORVASTATIN CALCIUM 80 MG/1
80 TABLET, FILM COATED ORAL NIGHTLY
Qty: 30 TABLET | Refills: 0 | Status: SHIPPED | OUTPATIENT
Start: 2024-01-09 | End: 2024-04-24 | Stop reason: SDUPTHER

## 2024-01-09 RX ORDER — HYDRALAZINE HYDROCHLORIDE 100 MG/1
100 TABLET, FILM COATED ORAL EVERY 8 HOURS
Qty: 90 TABLET | Refills: 0 | Status: SHIPPED | OUTPATIENT
Start: 2024-01-09 | End: 2024-03-18 | Stop reason: WASHOUT

## 2024-01-09 RX ORDER — AMLODIPINE BESYLATE 10 MG/1
TABLET ORAL
Qty: 30 TABLET | Refills: 2 | Status: SHIPPED | OUTPATIENT
Start: 2024-01-09 | End: 2024-02-12 | Stop reason: SDUPTHER

## 2024-01-09 RX ORDER — VALSARTAN 320 MG/1
320 TABLET ORAL DAILY
Qty: 30 TABLET | Refills: 0 | Status: SHIPPED | OUTPATIENT
Start: 2024-01-09 | End: 2024-03-18 | Stop reason: WASHOUT

## 2024-01-09 RX ORDER — ISOSORBIDE MONONITRATE 30 MG/1
30 TABLET, EXTENDED RELEASE ORAL DAILY
Qty: 30 TABLET | Refills: 0 | Status: SHIPPED | OUTPATIENT
Start: 2024-01-09 | End: 2024-04-14 | Stop reason: HOSPADM

## 2024-01-09 RX ORDER — CARVEDILOL 25 MG/1
25 TABLET ORAL 2 TIMES DAILY
Qty: 60 TABLET | Refills: 0 | Status: SHIPPED | OUTPATIENT
Start: 2024-01-09 | End: 2024-04-14 | Stop reason: HOSPADM

## 2024-01-09 RX ORDER — ONDANSETRON 4 MG/1
4 TABLET, FILM COATED ORAL EVERY 8 HOURS PRN
Qty: 30 TABLET | Refills: 0 | Status: SHIPPED | OUTPATIENT
Start: 2024-01-09 | End: 2024-07-01 | Stop reason: HOSPADM

## 2024-01-09 RX ORDER — ACETAMINOPHEN 325 MG/1
975 TABLET ORAL EVERY 8 HOURS PRN
Qty: 30 TABLET | Refills: 0 | Status: SHIPPED | OUTPATIENT
Start: 2024-01-09 | End: 2024-02-08

## 2024-01-09 RX ORDER — ALBUTEROL SULFATE 90 UG/1
2 INHALANT RESPIRATORY (INHALATION) 2 TIMES DAILY
Qty: 18 G | Refills: 11 | Status: SHIPPED | OUTPATIENT
Start: 2024-01-09 | End: 2024-07-08 | Stop reason: WASHOUT

## 2024-01-09 RX ADMIN — HYDRALAZINE HYDROCHLORIDE 100 MG: 25 TABLET ORAL at 06:20

## 2024-01-09 RX ADMIN — AMLODIPINE BESYLATE 10 MG: 10 TABLET ORAL at 13:04

## 2024-01-09 RX ADMIN — VALSARTAN 320 MG: 160 TABLET, FILM COATED ORAL at 13:04

## 2024-01-09 RX ADMIN — CARVEDILOL 25 MG: 25 TABLET, FILM COATED ORAL at 13:04

## 2024-01-09 RX ADMIN — Medication 1 TABLET: at 13:04

## 2024-01-09 RX ADMIN — HEPARIN SODIUM 5000 UNITS: 5000 INJECTION INTRAVENOUS; SUBCUTANEOUS at 06:20

## 2024-01-09 RX ADMIN — ISOSORBIDE MONONITRATE 30 MG: 30 TABLET, EXTENDED RELEASE ORAL at 13:04

## 2024-01-09 ASSESSMENT — COGNITIVE AND FUNCTIONAL STATUS - GENERAL
DAILY ACTIVITIY SCORE: 24
MOBILITY SCORE: 24

## 2024-01-09 ASSESSMENT — PAIN SCALES - GENERAL: PAINLEVEL_OUTOF10: 0 - NO PAIN

## 2024-01-09 ASSESSMENT — PAIN - FUNCTIONAL ASSESSMENT: PAIN_FUNCTIONAL_ASSESSMENT: 0-10

## 2024-01-09 NOTE — DISCHARGE SUMMARY
Discharge Diagnosis  ESRD (end stage renal disease) on dialysis (CMS/Formerly [facility])    Issues Requiring Follow-Up  Fu with PCP    Test Results Pending At Discharge  Pending Labs       No current pending labs.            Hospital Course  Stacey Heath is a 53 year old female PMH HFpEF, HTN, ESRD on HD T, TH, S, presenting to the CICU for hypertensive emergency, complaining of one day of dyspnea, chest pain, and constitutional symptoms. Patient is on an extensive home regimen for HTN management and denies missing any doses. She was scheduled for dialysis this morning however came to the ED because she has been feeling poorly.  She was diagnosed with respiratory failure due to volume overload and hypertensive emergency.  She was started on a nicardipine drip and given dialysis on 1/4. The nicardipine was able to be discontinued on the morning of 1/5 and she was restarted on home meds.     -           BP has been labile.  But better controlled when she is on her meds.    -           Discussed with nephro plans as patient is limiting how much volume can be removed with each section states he cannot tolerate more than 2 L, had 1.5 yesterday despite nephrology wanting to take off 3 L.  So this is going to delay her recovery.  When she is off oxygen she can discharge to outpatient follow-up to continue to work on her volume management.  -           Dispo is home           #Hypertensive emergency, resolved   #HFpEF with exacerbation  #acute hypoxemic respiratory failure due to hypertensive emergency and pulmonary edema   #ESRD on HD   #COPD    #Chronic anemia i/s/o renal failure          Pertinent Physical Exam At Time of Discharge  Physical Exam    Home Medications     Medication List      START taking these medications     acetaminophen 325 mg tablet; Commonly known as: Tylenol; Take 3 tablets   (975 mg) by mouth every 8 hours if needed for mild pain (1 - 3).   hydrOXYzine HCL 10 mg tablet; Commonly known as: Atarax; Take 1 tablet    (10 mg) by mouth every 6 hours if needed for itching.   isosorbide mononitrate ER 30 mg 24 hr tablet; Commonly known as: Imdur;   Take 1 tablet (30 mg) by mouth once daily. Do not crush or chew.   metoclopramide 5 mg tablet; Commonly known as: Reglan; TAKE 1 TABLET BY   MOUTH EVERY 6 HOURS AS NEEDED     CHANGE how you take these medications     cloNIDine 0.1 mg tablet; Commonly known as: Catapres; Take 1 tablet (0.1   mg) by mouth 2 times a day.; What changed: when to take this   fluticasone propion-salmeteroL 100-50 mcg/dose diskus inhaler; Commonly   known as: Advair Diskus; Inhale 1 puff once daily.; What changed: when to   take this   SUMAtriptan 50 mg tablet; Commonly known as: Imitrex; Take 1 tablet (50   mg) by mouth 1 time if needed for migraine.; What changed: how much to   take, how to take this, when to take this, reasons to take this     CONTINUE taking these medications     * albuterol 1.25 mg/3 mL nebulizer solution; Take 3 mL (1.25 mg) by   nebulization every 6 hours if needed for wheezing.   * albuterol 90 mcg/actuation inhaler; Commonly known as: Ventolin HFA;   Inhale 2 puffs 2 times a day.   amLODIPine 10 mg tablet; Commonly known as: Norvasc; TAKE 1 TABLET BY   MOUTH ONCE DAILY BEFORE DIALYSIS   atorvastatin 80 mg tablet; Commonly known as: Lipitor; Take 1 tablet (80   mg) by mouth once daily at bedtime.   carvedilol 25 mg tablet; Commonly known as: Coreg; Take 1 tablet (25 mg)   by mouth 2 times a day.   gabapentin 300 mg capsule; Commonly known as: Neurontin; Take 1 capsule   (300 mg) by mouth once daily at bedtime.   hydrALAZINE 100 mg tablet; Commonly known as: Apresoline; Take 1 tablet   (100 mg) by mouth every 8 hours.   ondansetron 4 mg tablet; Commonly known as: Zofran; Take 1 tablet (4 mg)   by mouth every 8 hours if needed for nausea or vomiting.   pantoprazole 40 mg EC tablet; Commonly known as: ProtoNix; Take 1 tablet   (40 mg) by mouth once daily. Do not crush, chew, or split.    Tab-A-Lucía tablet; Generic drug: multivitamin; Take 1 tablet by mouth   once daily.   valsartan 320 mg tablet; Commonly known as: Diovan; Take 1 tablet (320   mg) by mouth once daily.  * This list has 2 medication(s) that are the same as other medications   prescribed for you. Read the directions carefully, and ask your doctor or   other care provider to review them with you.     STOP taking these medications     ClearLax 17 gram/dose powder; Generic drug: polyethylene glycol   doxazosin 2 mg tablet; Commonly known as: Cardura   furosemide 40 mg tablet; Commonly known as: Lasix   polyethylene glycol 17 gram packet; Commonly known as: Glycolax, Miralax       Outpatient Follow-Up  No future appointments.    Soniya Ruff MD

## 2024-01-09 NOTE — NURSING NOTE
Report from Sending RN:    Report From: Polina( PONCHO)  Recent Surgery of Procedure: No  Baseline Level of Consciousness (LOC): A/O x 4  Oxygen Use: No  Type: none  Diabetic: No  Last BP Med Given Day of Dialysis: none  Last Pain Med Given: none  Lab Tests to be Obtained with Dialysis: No  Blood Transfusion to be Given During Dialysis: No  Available IV Access: Yes  Medications to be Administered During Dialysis: No  Continuous IV Infusion Running: No  Restraints on Currently or in the Last 24 Hours: No  Hand-Off Communication: No acute overnight or morning events; vss; Pt did take morning medications; No labs needed; Pt is a full code; Cassidy Gonzalez RN.    f/u with PCP/DC instructions

## 2024-01-09 NOTE — NURSING NOTE
.Report to Receiving RN:    Report To: PONCHO Sauer  Time Report Called: 1128  Hand-Off Communication: Pt tolerated HD well with no issue. Fluid removal 2.2 Liter /64 HR 91  Complications During Treatment: No  Ultrafiltration Treatment: No  Medications Administered During Dialysis: No  Blood Products Administered During Dialysis: No  Labs Sent During Dialysis: No  Heparin Drip Rate Changes: No    Last Updated: 11:28 AM by SALINAS GILBERT

## 2024-01-09 NOTE — PROGRESS NOTES
Transitional Care Coordination Progress Note:  Patient discussed during interdisciplinary rounds.  Team members present: MD, AGUSTIN  Plan per Medical/Surgical team: Plan for discharge home today. PT is recommending low intensity therapy post discharge.  Payer: [employer]  Status: Inpatient  Discharge disposition: [facility] for PT/OT services.  Potential Barriers: none  ADOD: 1/9  Pt aware PT is recommending low intensity therapy and prefers home PT/OT versus outpatient therapy, AOC: [facility]. [facility] confirmed receipt of home care orders and SOC 1/17. Pt drove herself to the hospital and stated she will drive home at time of discharge. Attending and nursing updated. Care coordinator will continue to follow for discharge planning needs.     Dorothy Ramos RN  Transitional Care Coordinator/TCC  [phone]

## 2024-01-09 NOTE — PROGRESS NOTES
Subjective     Interval History: Stacey Heath    Patient seen on dialysis, no complaints          Current Facility-Administered Medications:     acetaminophen (Tylenol) tablet 975 mg, 975 mg, oral, q8h PRN, Al Painter MD, 975 mg at 01/08/24 2134    albuterol 2.5 mg /3 mL (0.083 %) nebulizer solution 1.25 mg, 1.25 mg, nebulization, q6h PRN, Al Painter MD, 2.5 mg at 01/05/24 2139    amLODIPine (Norvasc) tablet 10 mg, 10 mg, oral, Daily, Al Painter MD, 10 mg at 01/08/24 0926    atorvastatin (Lipitor) tablet 80 mg, 80 mg, oral, Nightly, Al Painter MD, 80 mg at 01/08/24 2126    carvedilol (Coreg) tablet 25 mg, 25 mg, oral, BID, Al Painter MD, 25 mg at 01/08/24 2126    cloNIDine (Catapres) tablet 0.1 mg, 0.1 mg, oral, BID PRN, Yusra Chavez DO, 0.1 mg at 01/08/24 0024    epoetin joceline (Epogen,Procrit) injection 10,000 Units, 10,000 Units, intravenous, Once per day on Tue Thu SatArleen APRN-CNP    gabapentin (Neurontin) capsule 300 mg, 300 mg, oral, Daily, Al Painter MD, 300 mg at 01/08/24 2127    guaiFENesin (Mucinex) 12 hr tablet 600 mg, 600 mg, oral, BID PRN, Al Painter MD, 600 mg at 01/05/24 1240    heparin (porcine) injection 5,000 Units, 5,000 Units, subcutaneous, q8h, Al Painter MD, 5,000 Units at 01/09/24 0620    hydrALAZINE (Apresoline) tablet 100 mg, 100 mg, oral, q8h, Al Painter MD, 100 mg at 01/09/24 0620    hydrOXYzine HCL (Atarax) tablet 10 mg, 10 mg, oral, q6h PRN, Al Painter MD, 10 mg at 01/06/24 0403    ipratropium-albuteroL (Duo-Neb) 0.5-2.5 mg/3 mL nebulizer solution 3 mL, 3 mL, nebulization, q6h PRN, Al Painter MD, 3 mL at 01/07/24 2203    isosorbide mononitrate ER (Imdur) 24 hr tablet 30 mg, 30 mg, oral, Daily, Al Painter MD, 30 mg at 01/08/24 0926    metoclopramide (Reglan) tablet 5 mg, 5 mg, oral, q6h PRN, Al Painter MD, 5 mg at 01/05/24 0618    multivitamin with  minerals 1 tablet, 1 tablet, oral, Daily, Al Painter MD, 1 tablet at 01/08/24 0926    SUMAtriptan (Imitrex) tablet 50 mg, 50 mg, oral, q2h PRN, Al Painter MD, 50 mg at 01/08/24 0024    valsartan (Diovan) tablet 320 mg, 320 mg, oral, Daily, Al Painter MD, 320 mg at 01/08/24 0926    Physical Exam  Physical Exam  Heart S1 S2 RRR, Lungs CTA, no edema      Vital signs in last 24 hours:  Temp:  [36.1 °C (97 °F)-36.7 °C (98.1 °F)] 36.1 °C (97 °F)  Heart Rate:  [81-91] 91  Resp:  [16-20] 16  BP: (142-172)/(61-74) 147/69         Labs:  Results from last 7 days   Lab Units 01/09/24  0707   WBC AUTO x10*3/uL 6.5   RBC AUTO x10*6/uL 2.63*   HEMOGLOBIN g/dL 8.1*   HEMATOCRIT % 25.5*     Results from last 7 days   Lab Units 01/09/24  0707 01/08/24  0821 01/06/24  0348 01/05/24  0445   SODIUM mmol/L 133*   < > 142 140   POTASSIUM mmol/L 5.3   < > 4.4 3.7   CHLORIDE mmol/L 97*   < > 105 102   CO2 mmol/L 24   < > 28 30   BUN mg/dL 56*   < > 27* 14   CREATININE mg/dL 8.81*   < > 6.43* 4.41*   CALCIUM mg/dL 9.7   < > 8.7 8.6   PHOSPHORUS mg/dL 4.1   < > 4.7  --    MAGNESIUM mg/dL  --   --  2.30 2.12   BILIRUBIN TOTAL mg/dL  --   --   --  0.7   ALT U/L  --   --   --  5*   AST U/L  --   --   --  11    < > = values in this interval not displayed.            Assessment/Plan   Patient seen and examined while on dialysis, recent events, labs, medications reviewed. Will follow overall management per primary team, and continue regular dialysis.  Will reassess for need for additional ultrafiltration on 1/10    Principal Problem:    ESRD (end stage renal disease) on dialysis (CMS/[facility])  Hypertension, fluid overload      Abdulaziz Carpenter MD  1/9/2024  9:49 AM

## 2024-01-09 NOTE — HH CARE COORDINATION
Home Care received a Referral for Nursing, Physical Therapy, Occupational Therapy, and Home Health Aide. We have processed the referral for a Start of Care on 01/17/2024.     If you have any questions or concerns, please feel free to contact us at [phone]. Follow the prompts, enter your five digit zip code, and you will be directed to your care team on [facility] 3.

## 2024-01-11 LAB
ATRIAL RATE: 112 BPM
P AXIS: 55 DEGREES
P OFFSET: 197 MS
P ONSET: 148 MS
PR INTERVAL: 138 MS
Q ONSET: 217 MS
QRS COUNT: 18 BEATS
QRS DURATION: 78 MS
QT INTERVAL: 356 MS
QTC CALCULATION(BAZETT): 485 MS
QTC FREDERICIA: 438 MS
R AXIS: 20 DEGREES
T AXIS: 160 DEGREES
T OFFSET: 395 MS
VENTRICULAR RATE: 112 BPM

## 2024-01-12 ENCOUNTER — DOCUMENTATION (OUTPATIENT)
Dept: BEHAVIORAL HEALTH | Facility: CLINIC | Age: 54
End: 2024-01-12

## 2024-01-12 PROCEDURE — H2019 THER BEHAV SVC, PER 15 MIN: HCPCS | Mod: HE

## 2024-01-12 NOTE — PROGRESS NOTES
Provider called patient to schedule for the following week a visit for [facility]. Provider and patient agreed to meet the following week on Friday.

## 2024-01-12 NOTE — PROGRESS NOTES
Stacey Heath  Age: 53 y.o.  MRN: [id number]  Date: 1/12/2024  Location of service: phone call    Program Details  Medicaid Community Clinical Case Management  Status: Enrolled  Effective Dates: 10/17/2023 - present  Responsible Staff: PORFIRIO Valderrama      Goals Reviewed:        Summary:  This writer called patient to confirm our appointment for 1:30pm today. Patient requests we move the appointment to 2pm. This writer will meet with patient at 2pm.    Appointment start time: 1157  Appointment completion time: 1159  Total time spent with patient (in minutes): 2      Roberta Gallego RN

## 2024-01-12 NOTE — PROGRESS NOTES
Stacey Heath  Age: 53 y.o.  MRN: [id number]  Date: 1/12/2024  Location of service: in community    Program Details  Medicaid Community Clinical Case Management  Status: Enrolled  Effective Dates: 10/17/2023 - present  Responsible Staff: PORFIRIO Valderrama      Goals Reviewed:  Problem: Anxiety       Goal: Attempts to manage anxiety with help       Priority: High         Goal: Verbalizes ways to manage anxiety       Priority: High         Goal: Implements measures to reduce anxiety       Priority: High        Problem: Financial Stressors       Goal: Assistance with financial concerns         Problem: Risk of Uncoordinated Care       Goal: Care will be Coordinated and Supported by a Multidisciplinary Team of Providers       Priority: High          Summary:  Patient shares that she went and got a pedicure today for self-care.  Patient is feeling up to doing her cleaning and cooking today. She states it's a very good day. Patient is cooking when this writer arrived.  Patient discussed at length her hospital stay and how it went for her.  Patient discussed at length her relationships with her friends and her children.  We scheduled an appointment to meet at [facility] to get her Medicaid code changed.    Appointment start time: 1430  Appointment completion time: 1530  Total time spent with patient (in minutes): 60      Roberta Gallego RN

## 2024-01-12 NOTE — PROGRESS NOTES
Stacey Heath  Age: 53 y.o.  MRN: [id number]  Date: 1/12/2024  Location of service: in community    Program Details  Medicaid Community Clinical Case Management  Status: Enrolled  Effective Dates: 10/17/2023 - present  Responsible Staff: PORFIRIO Valderrama      Goals Reviewed:  Problem: Risk of Uncoordinated Care       Goal: Care will be Coordinated and Supported by a Multidisciplinary Team of Providers       Priority: High          Summary:  This writer met with patient in her home.     Assessment of Reported Physical Health Symptoms:  Patient reports feeling really good and energized today.     Assessment of Reported Mental Health Symptoms:  Patient reports no longer feeling hopeless, like she did when she was in the ED.     Nursing Interventions:  This writer wrote all of patient's medications down for her from her most recent hospital discharge.     Medication Education/Review:  This writer reviewed all medications with patient  Patient states she is taking all of the medications she is supposed to be taking at this time.      Current Outpatient Medications on File Prior to Visit   Medication Sig Dispense Refill    acetaminophen (Tylenol) 325 mg tablet Take 3 tablets (975 mg) by mouth every 8 hours if needed for mild pain (1 - 3). 30 tablet 0    albuterol (Ventolin HFA) 90 mcg/actuation inhaler Inhale 2 puffs 2 times a day. 18 g 11    albuterol 1.25 mg/3 mL nebulizer solution Take 3 mL (1.25 mg) by nebulization every 6 hours if needed for wheezing. 90 mL 11    amLODIPine (Norvasc) 10 mg tablet TAKE 1 TABLET BY MOUTH ONCE DAILY BEFORE DIALYSIS 30 tablet 2    atorvastatin (Lipitor) 80 mg tablet Take 1 tablet (80 mg) by mouth once daily at bedtime. 30 tablet 0    carvedilol (Coreg) 25 mg tablet Take 1 tablet (25 mg) by mouth 2 times a day. 60 tablet 0    cloNIDine (Catapres) 0.1 mg tablet Take 1 tablet (0.1 mg) by mouth 2 times a day. 60 tablet 0    fluticasone propion-salmeteroL (Advair Diskus) 100-50  mcg/dose diskus inhaler Inhale 1 puff once daily. 60 each 0    gabapentin (Neurontin) 300 mg capsule Take 1 capsule (300 mg) by mouth once daily at bedtime. 30 capsule 0    hydrALAZINE (Apresoline) 100 mg tablet Take 1 tablet (100 mg) by mouth every 8 hours. 90 tablet 0    hydrOXYzine HCL (Atarax) 10 mg tablet Take 1 tablet (10 mg) by mouth every 6 hours if needed for itching. 30 tablet 1    isosorbide mononitrate ER (Imdur) 30 mg 24 hr tablet Take 1 tablet (30 mg) by mouth once daily. Do not crush or chew. 30 tablet 0    metoclopramide (Reglan) 5 mg tablet TAKE 1 TABLET BY MOUTH EVERY 6 HOURS AS NEEDED 40 tablet 0    multivitamin tablet Take 1 tablet by mouth once daily. 30 tablet 0    ondansetron (Zofran) 4 mg tablet Take 1 tablet (4 mg) by mouth every 8 hours if needed for nausea or vomiting. 30 tablet 0    pantoprazole (ProtoNix) 40 mg EC tablet Take 1 tablet (40 mg) by mouth once daily. Do not crush, chew, or split. 30 tablet 0    SUMAtriptan (Imitrex) 50 mg tablet Take 1 tablet (50 mg) by mouth 1 time if needed for migraine. 30 tablet 3    valsartan (Diovan) 320 mg tablet Take 1 tablet (320 mg) by mouth once daily. 30 tablet 0    [DISCONTINUED] albuterol (Ventolin HFA) 90 mcg/actuation inhaler Inhale 2 puffs 2 times a day.      [DISCONTINUED] amLODIPine (Norvasc) 10 mg tablet TAKE 1 TABLET BY MOUTH ONCE DAILY BEFORE DIALYSIS 30 tablet 2    [DISCONTINUED] atorvastatin (Lipitor) 80 mg tablet Take 1 tablet (80 mg) by mouth once daily at bedtime. 30 tablet 2    [DISCONTINUED] carvedilol (Coreg) 25 mg tablet Take 1 tablet (25 mg) by mouth 2 times a day. 60 tablet 2    [DISCONTINUED] cloNIDine (Catapres) 0.1 mg tablet TAKE 1 TABLET BY MOUTH THREE TIMES A DAY (Patient not taking: Reported on 1/5/2024) 90 tablet 0    [DISCONTINUED] doxazosin (Cardura) 2 mg tablet TAKE 1 TABLET BY MOUTH TWO TIMES A DAY (Patient not taking: Reported on 1/5/2024) 60 tablet 2    [DISCONTINUED] fluticasone propion-salmeteroL (Advair  Diskus) 100-50 mcg/dose diskus inhaler Inhale 1 puff every 12 hours. 60 each 2    [DISCONTINUED] furosemide (Lasix) 40 mg tablet Take 1 tablet (40 mg) by mouth once daily. (Patient not taking: Reported on 1/5/2024) 30 tablet 2    [DISCONTINUED] gabapentin (Neurontin) 300 mg capsule Take 1 capsule (300 mg) by mouth once daily at bedtime. 30 capsule 2    [DISCONTINUED] hydrALAZINE (Apresoline) 100 mg tablet TAKE 1 TABLET BY MOUTH EVERY 8 HOURS 90 tablet 2    [DISCONTINUED] multivitamin tablet TAKE 1 TABLET BY MOUTH ONCE DAILY 90 tablet 3    [DISCONTINUED] ondansetron (Zofran) 4 mg tablet Take 1 tablet (4 mg) by mouth every 8 hours if needed for nausea or vomiting. (Patient not taking: Reported on 10/26/2023) 90 tablet 0    [DISCONTINUED] pantoprazole (ProtoNix) 40 mg EC tablet Take 1 tablet (40 mg) by mouth once daily. Do not crush, chew, or split.      [DISCONTINUED] polyethylene glycol (Glycolax) 17 gram packet Take 17 g by mouth 2 times a day. (Patient not taking: Reported on 10/26/2023) 72 each 3    [DISCONTINUED] polyethylene glycol (Glycolax, Miralax) 17 gram/dose powder MIX ONE CAPFUL (17 GRAMS) IN 8 OUNCES OF LIQUID AND DRINK BY MOUTH ONCE DAILY (Patient not taking: Reported on 10/26/2023) 510 g 2    [DISCONTINUED] SUMAtriptan (Imitrex) 50 mg tablet TAKE 1 TABLET BY MOUTH FOR MIGRAINE. MAY REPEAT EVERY 2 HOURS. MAX 200MG/DAY. 10 tablet 3    [DISCONTINUED] valsartan (Diovan) 320 mg tablet TAKE 1 TABLET BY MOUTH ONCE DAILY 90 tablet 3     No current facility-administered medications on file prior to visit.        Appointment start time: 1400  Appointment completion time: 1430  Total time spent with patient (in minutes): 30      Roberta Gallego RN

## 2024-01-15 ENCOUNTER — HOSPITAL ENCOUNTER (INPATIENT)
Facility: HOSPITAL | Age: 54
LOS: 4 days | Discharge: HOME | DRG: 291 | End: 2024-01-20
Attending: EMERGENCY MEDICINE | Admitting: INTERNAL MEDICINE
Payer: COMMERCIAL

## 2024-01-15 ENCOUNTER — CLINICAL SUPPORT (OUTPATIENT)
Dept: EMERGENCY MEDICINE | Facility: HOSPITAL | Age: 54
DRG: 291 | End: 2024-01-15
Payer: COMMERCIAL

## 2024-01-15 DIAGNOSIS — Z99.2 ESRD (END STAGE RENAL DISEASE) ON DIALYSIS (MULTI): ICD-10-CM

## 2024-01-15 DIAGNOSIS — R05.8 PRODUCTIVE COUGH: ICD-10-CM

## 2024-01-15 DIAGNOSIS — R06.02 SHORTNESS OF BREATH: ICD-10-CM

## 2024-01-15 DIAGNOSIS — I50.32 CHRONIC HEART FAILURE WITH PRESERVED EJECTION FRACTION (HFPEF) (MULTI): ICD-10-CM

## 2024-01-15 DIAGNOSIS — N18.6 ESRD (END STAGE RENAL DISEASE) ON DIALYSIS (MULTI): ICD-10-CM

## 2024-01-15 DIAGNOSIS — J81.0 ACUTE PULMONARY EDEMA (MULTI): Primary | ICD-10-CM

## 2024-01-15 LAB
ALBUMIN SERPL BCP-MCNC: 3.5 G/DL (ref 3.4–5)
ALP SERPL-CCNC: 124 U/L (ref 33–110)
ALT SERPL W P-5'-P-CCNC: 12 U/L (ref 7–45)
ANION GAP BLDV CALCULATED.4IONS-SCNC: ABNORMAL MMOL/L
ANION GAP SERPL CALC-SCNC: 16 MMOL/L (ref 10–20)
AST SERPL W P-5'-P-CCNC: 12 U/L (ref 9–39)
ATRIAL RATE: 102 BPM
BASE EXCESS BLDV CALC-SCNC: 6.6 MMOL/L (ref -2–3)
BILIRUB SERPL-MCNC: 0.5 MG/DL (ref 0–1.2)
BNP SERPL-MCNC: 2505 PG/ML (ref 0–99)
BODY TEMPERATURE: 37 DEGREES CELSIUS
BUN SERPL-MCNC: 36 MG/DL (ref 6–23)
CA-I BLDV-SCNC: 1.18 MMOL/L (ref 1.1–1.33)
CALCIUM SERPL-MCNC: 9.3 MG/DL (ref 8.6–10.6)
CARDIAC TROPONIN I PNL SERPL HS: 32 NG/L (ref 0–34)
CHLORIDE BLDV-SCNC: 103 MMOL/L (ref 98–107)
CHLORIDE SERPL-SCNC: 101 MMOL/L (ref 98–107)
CO2 SERPL-SCNC: 31 MMOL/L (ref 21–32)
CREAT SERPL-MCNC: 9.28 MG/DL (ref 0.5–1.05)
EGFRCR SERPLBLD CKD-EPI 2021: 5 ML/MIN/1.73M*2
ERYTHROCYTE [DISTWIDTH] IN BLOOD BY AUTOMATED COUNT: 14.2 % (ref 11.5–14.5)
GLUCOSE BLDV-MCNC: 84 MG/DL (ref 74–99)
GLUCOSE SERPL-MCNC: 83 MG/DL (ref 74–99)
HCO3 BLDV-SCNC: 31.8 MMOL/L (ref 22–26)
HCT VFR BLD AUTO: 21.7 % (ref 36–46)
HCT VFR BLD EST: 23 % (ref 36–46)
HGB BLD-MCNC: 7 G/DL (ref 12–16)
HGB BLDV-MCNC: 7.5 G/DL (ref 12–16)
LACTATE BLDV-SCNC: 0.7 MMOL/L (ref 0.4–2)
MAGNESIUM SERPL-MCNC: 2.34 MG/DL (ref 1.6–2.4)
MCH RBC QN AUTO: 29.2 PG (ref 26–34)
MCHC RBC AUTO-ENTMCNC: 32.3 G/DL (ref 32–36)
MCV RBC AUTO: 90 FL (ref 80–100)
NRBC BLD-RTO: 0 /100 WBCS (ref 0–0)
OXYHGB MFR BLDV: 70.8 % (ref 45–75)
P AXIS: 54 DEGREES
P OFFSET: 199 MS
P ONSET: 149 MS
PCO2 BLDV: 49 MM HG (ref 41–51)
PH BLDV: 7.42 PH (ref 7.33–7.43)
PHOSPHATE SERPL-MCNC: 4.9 MG/DL (ref 2.5–4.9)
PLATELET # BLD AUTO: 222 X10*3/UL (ref 150–450)
PO2 BLDV: 44 MM HG (ref 35–45)
POTASSIUM BLDV-SCNC: 4.7 MMOL/L (ref 3.5–5.3)
POTASSIUM SERPL-SCNC: 4.6 MMOL/L (ref 3.5–5.3)
PR INTERVAL: 142 MS
PROT SERPL-MCNC: 7.1 G/DL (ref 6.4–8.2)
Q ONSET: 220 MS
QRS COUNT: 17 BEATS
QRS DURATION: 80 MS
QT INTERVAL: 334 MS
QTC CALCULATION(BAZETT): 435 MS
QTC FREDERICIA: 398 MS
R AXIS: 47 DEGREES
RBC # BLD AUTO: 2.4 X10*6/UL (ref 4–5.2)
SAO2 % BLDV: 72 % (ref 45–75)
SODIUM BLDV-SCNC: ABNORMAL MMOL/L
SODIUM SERPL-SCNC: 143 MMOL/L (ref 136–145)
T AXIS: 116 DEGREES
T OFFSET: 387 MS
VENTRICULAR RATE: 102 BPM
WBC # BLD AUTO: 8.2 X10*3/UL (ref 4.4–11.3)

## 2024-01-15 PROCEDURE — 96374 THER/PROPH/DIAG INJ IV PUSH: CPT | Mod: 59

## 2024-01-15 PROCEDURE — 36415 COLL VENOUS BLD VENIPUNCTURE: CPT | Performed by: EMERGENCY MEDICINE

## 2024-01-15 PROCEDURE — 93005 ELECTROCARDIOGRAM TRACING: CPT

## 2024-01-15 PROCEDURE — 36415 COLL VENOUS BLD VENIPUNCTURE: CPT | Performed by: STUDENT IN AN ORGANIZED HEALTH CARE EDUCATION/TRAINING PROGRAM

## 2024-01-15 PROCEDURE — 84132 ASSAY OF SERUM POTASSIUM: CPT | Performed by: EMERGENCY MEDICINE

## 2024-01-15 PROCEDURE — 76604 US EXAM CHEST: CPT | Performed by: EMERGENCY MEDICINE

## 2024-01-15 PROCEDURE — 2500000001 HC RX 250 WO HCPCS SELF ADMINISTERED DRUGS (ALT 637 FOR MEDICARE OP): Performed by: STUDENT IN AN ORGANIZED HEALTH CARE EDUCATION/TRAINING PROGRAM

## 2024-01-15 PROCEDURE — 84484 ASSAY OF TROPONIN QUANT: CPT | Performed by: STUDENT IN AN ORGANIZED HEALTH CARE EDUCATION/TRAINING PROGRAM

## 2024-01-15 PROCEDURE — 99291 CRITICAL CARE FIRST HOUR: CPT | Performed by: EMERGENCY MEDICINE

## 2024-01-15 PROCEDURE — 99291 CRITICAL CARE FIRST HOUR: CPT | Mod: 25 | Performed by: EMERGENCY MEDICINE

## 2024-01-15 PROCEDURE — 85027 COMPLETE CBC AUTOMATED: CPT | Performed by: EMERGENCY MEDICINE

## 2024-01-15 PROCEDURE — 2500000004 HC RX 250 GENERAL PHARMACY W/ HCPCS (ALT 636 FOR OP/ED): Performed by: STUDENT IN AN ORGANIZED HEALTH CARE EDUCATION/TRAINING PROGRAM

## 2024-01-15 PROCEDURE — 82728 ASSAY OF FERRITIN: CPT

## 2024-01-15 PROCEDURE — 83540 ASSAY OF IRON: CPT

## 2024-01-15 PROCEDURE — 2500000005 HC RX 250 GENERAL PHARMACY W/O HCPCS: Performed by: STUDENT IN AN ORGANIZED HEALTH CARE EDUCATION/TRAINING PROGRAM

## 2024-01-15 PROCEDURE — 83880 ASSAY OF NATRIURETIC PEPTIDE: CPT | Performed by: EMERGENCY MEDICINE

## 2024-01-15 PROCEDURE — 84100 ASSAY OF PHOSPHORUS: CPT | Performed by: STUDENT IN AN ORGANIZED HEALTH CARE EDUCATION/TRAINING PROGRAM

## 2024-01-15 PROCEDURE — 84132 ASSAY OF SERUM POTASSIUM: CPT

## 2024-01-15 PROCEDURE — 5A09357 ASSISTANCE WITH RESPIRATORY VENTILATION, LESS THAN 24 CONSECUTIVE HOURS, CONTINUOUS POSITIVE AIRWAY PRESSURE: ICD-10-PCS | Performed by: EMERGENCY MEDICINE

## 2024-01-15 PROCEDURE — 83735 ASSAY OF MAGNESIUM: CPT | Performed by: STUDENT IN AN ORGANIZED HEALTH CARE EDUCATION/TRAINING PROGRAM

## 2024-01-15 PROCEDURE — 80061 LIPID PANEL: CPT | Performed by: STUDENT IN AN ORGANIZED HEALTH CARE EDUCATION/TRAINING PROGRAM

## 2024-01-15 PROCEDURE — 93010 ELECTROCARDIOGRAM REPORT: CPT | Performed by: EMERGENCY MEDICINE

## 2024-01-15 PROCEDURE — 96375 TX/PRO/DX INJ NEW DRUG ADDON: CPT | Mod: 59

## 2024-01-15 PROCEDURE — 93308 TTE F-UP OR LMTD: CPT | Performed by: EMERGENCY MEDICINE

## 2024-01-15 PROCEDURE — 94660 CPAP INITIATION&MGMT: CPT

## 2024-01-15 RX ORDER — HYDRALAZINE HYDROCHLORIDE 25 MG/1
100 TABLET, FILM COATED ORAL ONCE
Status: COMPLETED | OUTPATIENT
Start: 2024-01-15 | End: 2024-01-15

## 2024-01-15 RX ORDER — VALSARTAN 160 MG/1
320 TABLET ORAL ONCE
Status: COMPLETED | OUTPATIENT
Start: 2024-01-15 | End: 2024-01-15

## 2024-01-15 RX ORDER — FUROSEMIDE 10 MG/ML
60 INJECTION INTRAMUSCULAR; INTRAVENOUS ONCE
Status: COMPLETED | OUTPATIENT
Start: 2024-01-15 | End: 2024-01-15

## 2024-01-15 RX ORDER — CLONIDINE HYDROCHLORIDE 0.1 MG/1
0.1 TABLET ORAL ONCE
Status: COMPLETED | OUTPATIENT
Start: 2024-01-15 | End: 2024-01-15

## 2024-01-15 RX ORDER — NITROGLYCERIN 20 MG/100ML
1000 INJECTION INTRAVENOUS ONCE
Status: COMPLETED | OUTPATIENT
Start: 2024-01-15 | End: 2024-01-15

## 2024-01-15 RX ORDER — ISOSORBIDE MONONITRATE 30 MG/1
30 TABLET, EXTENDED RELEASE ORAL ONCE
Status: COMPLETED | OUTPATIENT
Start: 2024-01-15 | End: 2024-01-15

## 2024-01-15 RX ORDER — NITROGLYCERIN 20 MG/100ML
5-200 INJECTION INTRAVENOUS CONTINUOUS
Status: DISCONTINUED | OUTPATIENT
Start: 2024-01-15 | End: 2024-01-16 | Stop reason: WASHOUT

## 2024-01-15 RX ORDER — CARVEDILOL 12.5 MG/1
25 TABLET ORAL ONCE
Status: COMPLETED | OUTPATIENT
Start: 2024-01-15 | End: 2024-01-15

## 2024-01-15 RX ORDER — AMLODIPINE BESYLATE 5 MG/1
10 TABLET ORAL ONCE
Status: COMPLETED | OUTPATIENT
Start: 2024-01-15 | End: 2024-01-15

## 2024-01-15 RX ADMIN — VALSARTAN 320 MG: 160 TABLET, FILM COATED ORAL at 21:53

## 2024-01-15 RX ADMIN — CARVEDILOL 25 MG: 12.5 TABLET, FILM COATED ORAL at 21:53

## 2024-01-15 RX ADMIN — AMLODIPINE BESYLATE 10 MG: 5 TABLET ORAL at 21:53

## 2024-01-15 RX ADMIN — ISOSORBIDE MONONITRATE 30 MG: 30 TABLET, EXTENDED RELEASE ORAL at 21:53

## 2024-01-15 RX ADMIN — NITROGLYCERIN 1000 MCG: 20 INJECTION INTRAVENOUS at 21:10

## 2024-01-15 RX ADMIN — CLONIDINE HYDROCHLORIDE 0.1 MG: 0.1 TABLET ORAL at 21:53

## 2024-01-15 RX ADMIN — Medication 3 L/MIN: at 21:20

## 2024-01-15 RX ADMIN — FUROSEMIDE 60 MG: 10 INJECTION, SOLUTION INTRAVENOUS at 21:53

## 2024-01-15 RX ADMIN — NITROGLYCERIN 5 MCG/MIN: 20 INJECTION INTRAVENOUS at 21:10

## 2024-01-15 RX ADMIN — HYDRALAZINE HYDROCHLORIDE 100 MG: 25 TABLET ORAL at 21:53

## 2024-01-15 ASSESSMENT — COLUMBIA-SUICIDE SEVERITY RATING SCALE - C-SSRS
1. IN THE PAST MONTH, HAVE YOU WISHED YOU WERE DEAD OR WISHED YOU COULD GO TO SLEEP AND NOT WAKE UP?: NO
2. HAVE YOU ACTUALLY HAD ANY THOUGHTS OF KILLING YOURSELF?: NO
6. HAVE YOU EVER DONE ANYTHING, STARTED TO DO ANYTHING, OR PREPARED TO DO ANYTHING TO END YOUR LIFE?: NO

## 2024-01-16 ENCOUNTER — APPOINTMENT (OUTPATIENT)
Dept: DIALYSIS | Facility: HOSPITAL | Age: 54
End: 2024-01-16
Payer: COMMERCIAL

## 2024-01-16 ENCOUNTER — APPOINTMENT (OUTPATIENT)
Dept: CARDIOLOGY | Facility: HOSPITAL | Age: 54
DRG: 291 | End: 2024-01-16
Payer: COMMERCIAL

## 2024-01-16 ENCOUNTER — APPOINTMENT (OUTPATIENT)
Dept: RADIOLOGY | Facility: HOSPITAL | Age: 54
DRG: 291 | End: 2024-01-16
Payer: COMMERCIAL

## 2024-01-16 PROBLEM — J81.0 ACUTE PULMONARY EDEMA (MULTI): Status: ACTIVE | Noted: 2024-01-16

## 2024-01-16 LAB
ABO GROUP (TYPE) IN BLOOD: NORMAL
ALBUMIN SERPL BCP-MCNC: 3.2 G/DL (ref 3.4–5)
ANION GAP SERPL CALC-SCNC: 17 MMOL/L (ref 10–20)
ANTIBODY SCREEN: NORMAL
APAP SERPL-MCNC: <10 UG/ML
APTT PPP: 33 SECONDS (ref 27–38)
ATRIAL RATE: 78 BPM
BASOPHILS # BLD AUTO: 0.05 X10*3/UL (ref 0–0.1)
BASOPHILS # BLD AUTO: 0.06 X10*3/UL (ref 0–0.1)
BASOPHILS # BLD AUTO: 0.07 X10*3/UL (ref 0–0.1)
BASOPHILS NFR BLD AUTO: 0.9 %
BASOPHILS NFR BLD AUTO: 0.9 %
BASOPHILS NFR BLD AUTO: 1.1 %
BUN SERPL-MCNC: 34 MG/DL (ref 6–23)
CALCIUM SERPL-MCNC: 8.7 MG/DL (ref 8.6–10.6)
CARDIAC TROPONIN I PNL SERPL HS: 34 NG/L (ref 0–34)
CHLORIDE SERPL-SCNC: 102 MMOL/L (ref 98–107)
CHOLEST SERPL-MCNC: 108 MG/DL (ref 0–199)
CHOLESTEROL/HDL RATIO: 2.7
CO2 SERPL-SCNC: 29 MMOL/L (ref 21–32)
CREAT SERPL-MCNC: 9.09 MG/DL (ref 0.5–1.05)
EGFRCR SERPLBLD CKD-EPI 2021: 5 ML/MIN/1.73M*2
EOSINOPHIL # BLD AUTO: 0.33 X10*3/UL (ref 0–0.7)
EOSINOPHIL # BLD AUTO: 0.37 X10*3/UL (ref 0–0.7)
EOSINOPHIL # BLD AUTO: 0.51 X10*3/UL (ref 0–0.7)
EOSINOPHIL NFR BLD AUTO: 5.7 %
EOSINOPHIL NFR BLD AUTO: 5.8 %
EOSINOPHIL NFR BLD AUTO: 7.8 %
ERYTHROCYTE [DISTWIDTH] IN BLOOD BY AUTOMATED COUNT: 14.2 % (ref 11.5–14.5)
ERYTHROCYTE [DISTWIDTH] IN BLOOD BY AUTOMATED COUNT: 14.4 % (ref 11.5–14.5)
ERYTHROCYTE [DISTWIDTH] IN BLOOD BY AUTOMATED COUNT: 15.7 % (ref 11.5–14.5)
EST. AVERAGE GLUCOSE BLD GHB EST-MCNC: 74 MG/DL
ETHANOL SERPL-MCNC: <10 MG/DL
FERRITIN SERPL-MCNC: 1044 NG/ML (ref 8–150)
FOLATE SERPL-MCNC: 22.4 NG/ML
GLUCOSE BLD MANUAL STRIP-MCNC: 79 MG/DL (ref 74–99)
GLUCOSE SERPL-MCNC: 86 MG/DL (ref 74–99)
HBA1C MFR BLD: 4.2 %
HCT VFR BLD AUTO: 17.7 % (ref 36–46)
HCT VFR BLD AUTO: 18.9 % (ref 36–46)
HCT VFR BLD AUTO: 25.6 % (ref 36–46)
HDLC SERPL-MCNC: 39.4 MG/DL
HGB BLD-MCNC: 5.6 G/DL (ref 12–16)
HGB BLD-MCNC: 6 G/DL (ref 12–16)
HGB BLD-MCNC: 8.4 G/DL (ref 12–16)
HGB RETIC QN: 32 PG (ref 28–38)
HIV 1+2 AB+HIV1 P24 AG SERPL QL IA: NONREACTIVE
HIV 1+2 AB+HIV1 P24 AG SERPL QL IA: NONREACTIVE
IMM GRANULOCYTES # BLD AUTO: 0.02 X10*3/UL (ref 0–0.7)
IMM GRANULOCYTES # BLD AUTO: 0.02 X10*3/UL (ref 0–0.7)
IMM GRANULOCYTES # BLD AUTO: 0.03 X10*3/UL (ref 0–0.7)
IMM GRANULOCYTES NFR BLD AUTO: 0.3 % (ref 0–0.9)
IMM GRANULOCYTES NFR BLD AUTO: 0.4 % (ref 0–0.9)
IMM GRANULOCYTES NFR BLD AUTO: 0.5 % (ref 0–0.9)
IMMATURE RETIC FRACTION: 5.4 %
INR PPP: 1.2 (ref 0.9–1.1)
IRON SATN MFR SERPL: 17 % (ref 25–45)
IRON SERPL-MCNC: 35 UG/DL (ref 35–150)
LDH SERPL L TO P-CCNC: 273 U/L (ref 84–246)
LDLC SERPL CALC-MCNC: 48 MG/DL
LYMPHOCYTES # BLD AUTO: 0.83 X10*3/UL (ref 1.2–4.8)
LYMPHOCYTES # BLD AUTO: 0.87 X10*3/UL (ref 1.2–4.8)
LYMPHOCYTES # BLD AUTO: 0.97 X10*3/UL (ref 1.2–4.8)
LYMPHOCYTES NFR BLD AUTO: 13.4 %
LYMPHOCYTES NFR BLD AUTO: 14.7 %
LYMPHOCYTES NFR BLD AUTO: 14.9 %
MCH RBC QN AUTO: 29.1 PG (ref 26–34)
MCH RBC QN AUTO: 29.3 PG (ref 26–34)
MCH RBC QN AUTO: 30.6 PG (ref 26–34)
MCHC RBC AUTO-ENTMCNC: 31.6 G/DL (ref 32–36)
MCHC RBC AUTO-ENTMCNC: 31.7 G/DL (ref 32–36)
MCHC RBC AUTO-ENTMCNC: 32.8 G/DL (ref 32–36)
MCV RBC AUTO: 89 FL (ref 80–100)
MCV RBC AUTO: 92 FL (ref 80–100)
MCV RBC AUTO: 97 FL (ref 80–100)
MONOCYTES # BLD AUTO: 0.33 X10*3/UL (ref 0.1–1)
MONOCYTES # BLD AUTO: 0.34 X10*3/UL (ref 0.1–1)
MONOCYTES # BLD AUTO: 0.41 X10*3/UL (ref 0.1–1)
MONOCYTES NFR BLD AUTO: 5.2 %
MONOCYTES NFR BLD AUTO: 5.8 %
MONOCYTES NFR BLD AUTO: 6.3 %
NEUTROPHILS # BLD AUTO: 4.1 X10*3/UL (ref 1.2–7.7)
NEUTROPHILS # BLD AUTO: 4.61 X10*3/UL (ref 1.2–7.7)
NEUTROPHILS # BLD AUTO: 4.74 X10*3/UL (ref 1.2–7.7)
NEUTROPHILS NFR BLD AUTO: 70.9 %
NEUTROPHILS NFR BLD AUTO: 72.4 %
NEUTROPHILS NFR BLD AUTO: 73 %
NON HDL CHOLESTEROL: 69 MG/DL (ref 0–149)
NRBC BLD-RTO: 0 /100 WBCS (ref 0–0)
P AXIS: 61 DEGREES
P OFFSET: 191 MS
P ONSET: 151 MS
PHOSPHATE SERPL-MCNC: 5.2 MG/DL (ref 2.5–4.9)
PLATELET # BLD AUTO: 195 X10*3/UL (ref 150–450)
PLATELET # BLD AUTO: 208 X10*3/UL (ref 150–450)
PLATELET # BLD AUTO: 217 X10*3/UL (ref 150–450)
POTASSIUM SERPL-SCNC: 5.1 MMOL/L (ref 3.5–5.3)
PR INTERVAL: 136 MS
PROTHROMBIN TIME: 13.8 SECONDS (ref 9.8–12.8)
Q ONSET: 219 MS
QRS COUNT: 12 BEATS
QRS DURATION: 80 MS
QT INTERVAL: 384 MS
QTC CALCULATION(BAZETT): 437 MS
QTC FREDERICIA: 419 MS
R AXIS: 46 DEGREES
RBC # BLD AUTO: 1.83 X10*6/UL (ref 4–5.2)
RBC # BLD AUTO: 2.06 X10*6/UL (ref 4–5.2)
RBC # BLD AUTO: 2.87 X10*6/UL (ref 4–5.2)
RETICS #: 0.05 X10*6/UL (ref 0.02–0.08)
RETICS/RBC NFR AUTO: 2.8 % (ref 0.5–2)
RH FACTOR (ANTIGEN D): NORMAL
SALICYLATES SERPL-MCNC: <3 MG/DL
SODIUM SERPL-SCNC: 143 MMOL/L (ref 136–145)
T AXIS: 152 DEGREES
T OFFSET: 411 MS
TIBC SERPL-MCNC: 201 UG/DL (ref 240–445)
TRANSFERRIN SERPL-MCNC: 190 MG/DL (ref 200–360)
TRIGL SERPL-MCNC: 103 MG/DL (ref 0–149)
TSH SERPL-ACNC: 2.32 MIU/L (ref 0.44–3.98)
UIBC SERPL-MCNC: 166 UG/DL (ref 110–370)
VENTRICULAR RATE: 78 BPM
VIT B12 SERPL-MCNC: 312 PG/ML (ref 211–911)
VLDL: 21 MG/DL (ref 0–40)
WBC # BLD AUTO: 5.7 X10*3/UL (ref 4.4–11.3)
WBC # BLD AUTO: 6.5 X10*3/UL (ref 4.4–11.3)
WBC # BLD AUTO: 6.5 X10*3/UL (ref 4.4–11.3)

## 2024-01-16 PROCEDURE — 2500000001 HC RX 250 WO HCPCS SELF ADMINISTERED DRUGS (ALT 637 FOR MEDICARE OP): Performed by: STUDENT IN AN ORGANIZED HEALTH CARE EDUCATION/TRAINING PROGRAM

## 2024-01-16 PROCEDURE — 84443 ASSAY THYROID STIM HORMONE: CPT | Mod: WESLAB | Performed by: STUDENT IN AN ORGANIZED HEALTH CARE EDUCATION/TRAINING PROGRAM

## 2024-01-16 PROCEDURE — 2500000005 HC RX 250 GENERAL PHARMACY W/O HCPCS

## 2024-01-16 PROCEDURE — 83010 ASSAY OF HAPTOGLOBIN QUANT: CPT | Mod: WESLAB | Performed by: STUDENT IN AN ORGANIZED HEALTH CARE EDUCATION/TRAINING PROGRAM

## 2024-01-16 PROCEDURE — 2500000004 HC RX 250 GENERAL PHARMACY W/ HCPCS (ALT 636 FOR OP/ED)

## 2024-01-16 PROCEDURE — 83615 LACTATE (LD) (LDH) ENZYME: CPT | Performed by: STUDENT IN AN ORGANIZED HEALTH CARE EDUCATION/TRAINING PROGRAM

## 2024-01-16 PROCEDURE — 87389 HIV-1 AG W/HIV-1&-2 AB AG IA: CPT

## 2024-01-16 PROCEDURE — 71045 X-RAY EXAM CHEST 1 VIEW: CPT | Performed by: RADIOLOGY

## 2024-01-16 PROCEDURE — 99221 1ST HOSP IP/OBS SF/LOW 40: CPT | Performed by: STUDENT IN AN ORGANIZED HEALTH CARE EDUCATION/TRAINING PROGRAM

## 2024-01-16 PROCEDURE — 71045 X-RAY EXAM CHEST 1 VIEW: CPT

## 2024-01-16 PROCEDURE — 82947 ASSAY GLUCOSE BLOOD QUANT: CPT

## 2024-01-16 PROCEDURE — 85045 AUTOMATED RETICULOCYTE COUNT: CPT

## 2024-01-16 PROCEDURE — 85025 COMPLETE CBC W/AUTO DIFF WBC: CPT | Performed by: STUDENT IN AN ORGANIZED HEALTH CARE EDUCATION/TRAINING PROGRAM

## 2024-01-16 PROCEDURE — 82607 VITAMIN B-12: CPT

## 2024-01-16 PROCEDURE — 85025 COMPLETE CBC W/AUTO DIFF WBC: CPT

## 2024-01-16 PROCEDURE — 80143 DRUG ASSAY ACETAMINOPHEN: CPT | Mod: WESLAB | Performed by: STUDENT IN AN ORGANIZED HEALTH CARE EDUCATION/TRAINING PROGRAM

## 2024-01-16 PROCEDURE — 76937 US GUIDE VASCULAR ACCESS: CPT

## 2024-01-16 PROCEDURE — 83036 HEMOGLOBIN GLYCOSYLATED A1C: CPT | Performed by: STUDENT IN AN ORGANIZED HEALTH CARE EDUCATION/TRAINING PROGRAM

## 2024-01-16 PROCEDURE — 86901 BLOOD TYPING SEROLOGIC RH(D): CPT

## 2024-01-16 PROCEDURE — 5A1D70Z PERFORMANCE OF URINARY FILTRATION, INTERMITTENT, LESS THAN 6 HOURS PER DAY: ICD-10-PCS | Performed by: STUDENT IN AN ORGANIZED HEALTH CARE EDUCATION/TRAINING PROGRAM

## 2024-01-16 PROCEDURE — 93005 ELECTROCARDIOGRAM TRACING: CPT

## 2024-01-16 PROCEDURE — 90935 HEMODIALYSIS ONE EVALUATION: CPT | Performed by: STUDENT IN AN ORGANIZED HEALTH CARE EDUCATION/TRAINING PROGRAM

## 2024-01-16 PROCEDURE — 36415 COLL VENOUS BLD VENIPUNCTURE: CPT

## 2024-01-16 PROCEDURE — 2500000002 HC RX 250 W HCPCS SELF ADMINISTERED DRUGS (ALT 637 FOR MEDICARE OP, ALT 636 FOR OP/ED): Performed by: STUDENT IN AN ORGANIZED HEALTH CARE EDUCATION/TRAINING PROGRAM

## 2024-01-16 PROCEDURE — 2500000001 HC RX 250 WO HCPCS SELF ADMINISTERED DRUGS (ALT 637 FOR MEDICARE OP)

## 2024-01-16 PROCEDURE — 85610 PROTHROMBIN TIME: CPT

## 2024-01-16 PROCEDURE — 36430 TRANSFUSION BLD/BLD COMPNT: CPT

## 2024-01-16 PROCEDURE — 82746 ASSAY OF FOLIC ACID SERUM: CPT | Mod: WESLAB | Performed by: STUDENT IN AN ORGANIZED HEALTH CARE EDUCATION/TRAINING PROGRAM

## 2024-01-16 PROCEDURE — 94799 UNLISTED PULMONARY SVC/PX: CPT

## 2024-01-16 PROCEDURE — 86922 COMPATIBILITY TEST ANTIGLOB: CPT

## 2024-01-16 PROCEDURE — P9040 RBC LEUKOREDUCED IRRADIATED: HCPCS

## 2024-01-16 PROCEDURE — 93010 ELECTROCARDIOGRAM REPORT: CPT | Performed by: INTERNAL MEDICINE

## 2024-01-16 PROCEDURE — 1210000001 HC SEMI-PRIVATE ROOM DAILY

## 2024-01-16 PROCEDURE — 94640 AIRWAY INHALATION TREATMENT: CPT

## 2024-01-16 PROCEDURE — 83921 ORGANIC ACID SINGLE QUANT: CPT | Mod: WESLAB | Performed by: STUDENT IN AN ORGANIZED HEALTH CARE EDUCATION/TRAINING PROGRAM

## 2024-01-16 PROCEDURE — 87389 HIV-1 AG W/HIV-1&-2 AB AG IA: CPT | Performed by: STUDENT IN AN ORGANIZED HEALTH CARE EDUCATION/TRAINING PROGRAM

## 2024-01-16 PROCEDURE — 85025 COMPLETE CBC W/AUTO DIFF WBC: CPT | Performed by: INTERNAL MEDICINE

## 2024-01-16 PROCEDURE — 80069 RENAL FUNCTION PANEL: CPT

## 2024-01-16 PROCEDURE — 99291 CRITICAL CARE FIRST HOUR: CPT | Performed by: INTERNAL MEDICINE

## 2024-01-16 PROCEDURE — 84466 ASSAY OF TRANSFERRIN: CPT | Mod: WESLAB

## 2024-01-16 PROCEDURE — 8010000001 HC DIALYSIS - HEMODIALYSIS PER DAY

## 2024-01-16 RX ORDER — ACETAMINOPHEN 325 MG/1
TABLET ORAL
Status: COMPLETED
Start: 2024-01-16 | End: 2024-01-16

## 2024-01-16 RX ORDER — AMLODIPINE BESYLATE 10 MG/1
10 TABLET ORAL DAILY
Status: DISCONTINUED | OUTPATIENT
Start: 2024-01-16 | End: 2024-01-20 | Stop reason: HOSPADM

## 2024-01-16 RX ORDER — GABAPENTIN 300 MG/1
300 CAPSULE ORAL NIGHTLY
Status: DISCONTINUED | OUTPATIENT
Start: 2024-01-16 | End: 2024-01-20 | Stop reason: HOSPADM

## 2024-01-16 RX ORDER — HYDRALAZINE HYDROCHLORIDE 50 MG/1
100 TABLET, FILM COATED ORAL EVERY 8 HOURS
Status: DISCONTINUED | OUTPATIENT
Start: 2024-01-16 | End: 2024-01-20 | Stop reason: HOSPADM

## 2024-01-16 RX ORDER — CARVEDILOL 25 MG/1
25 TABLET ORAL 2 TIMES DAILY
Status: DISCONTINUED | OUTPATIENT
Start: 2024-01-16 | End: 2024-01-20 | Stop reason: HOSPADM

## 2024-01-16 RX ORDER — ACETAMINOPHEN 325 MG/1
975 TABLET ORAL EVERY 8 HOURS PRN
Status: DISCONTINUED | OUTPATIENT
Start: 2024-01-16 | End: 2024-01-18

## 2024-01-16 RX ORDER — ALBUTEROL SULFATE 90 UG/1
2 AEROSOL, METERED RESPIRATORY (INHALATION) 2 TIMES DAILY
Status: DISCONTINUED | OUTPATIENT
Start: 2024-01-16 | End: 2024-01-20 | Stop reason: HOSPADM

## 2024-01-16 RX ORDER — ONDANSETRON HYDROCHLORIDE 2 MG/ML
4 INJECTION, SOLUTION INTRAVENOUS ONCE
Status: COMPLETED | OUTPATIENT
Start: 2024-01-16 | End: 2024-01-16

## 2024-01-16 RX ORDER — PANTOPRAZOLE SODIUM 40 MG/1
40 TABLET, DELAYED RELEASE ORAL DAILY
Status: DISCONTINUED | OUTPATIENT
Start: 2024-01-16 | End: 2024-01-20 | Stop reason: HOSPADM

## 2024-01-16 RX ORDER — NOREPINEPHRINE BITARTRATE/D5W 8 MG/250ML
.01-1 PLASTIC BAG, INJECTION (ML) INTRAVENOUS CONTINUOUS
Status: DISCONTINUED | OUTPATIENT
Start: 2024-01-16 | End: 2024-01-16 | Stop reason: WASHOUT

## 2024-01-16 RX ORDER — SUMATRIPTAN 50 MG/1
50 TABLET, FILM COATED ORAL ONCE AS NEEDED
Status: DISCONTINUED | OUTPATIENT
Start: 2024-01-16 | End: 2024-01-16

## 2024-01-16 RX ORDER — ATORVASTATIN CALCIUM 80 MG/1
80 TABLET, FILM COATED ORAL NIGHTLY
Status: DISCONTINUED | OUTPATIENT
Start: 2024-01-16 | End: 2024-01-20 | Stop reason: HOSPADM

## 2024-01-16 RX ORDER — CLONIDINE HYDROCHLORIDE 0.1 MG/1
0.1 TABLET ORAL 2 TIMES DAILY
Status: DISCONTINUED | OUTPATIENT
Start: 2024-01-16 | End: 2024-01-20 | Stop reason: HOSPADM

## 2024-01-16 RX ORDER — ISOSORBIDE MONONITRATE 30 MG/1
30 TABLET, EXTENDED RELEASE ORAL DAILY
Status: DISCONTINUED | OUTPATIENT
Start: 2024-01-16 | End: 2024-01-20 | Stop reason: HOSPADM

## 2024-01-16 RX ORDER — NOREPINEPHRINE BITARTRATE/D5W 8 MG/250ML
PLASTIC BAG, INJECTION (ML) INTRAVENOUS
Status: COMPLETED
Start: 2024-01-16 | End: 2024-01-16

## 2024-01-16 RX ORDER — FLUTICASONE FUROATE AND VILANTEROL 100; 25 UG/1; UG/1
1 POWDER RESPIRATORY (INHALATION)
Status: DISCONTINUED | OUTPATIENT
Start: 2024-01-16 | End: 2024-01-20 | Stop reason: HOSPADM

## 2024-01-16 RX ORDER — HEPARIN SODIUM 5000 [USP'U]/ML
5000 INJECTION, SOLUTION INTRAVENOUS; SUBCUTANEOUS EVERY 8 HOURS
Status: DISCONTINUED | OUTPATIENT
Start: 2024-01-16 | End: 2024-01-20 | Stop reason: HOSPADM

## 2024-01-16 RX ORDER — VALSARTAN 320 MG/1
320 TABLET ORAL DAILY
Status: DISCONTINUED | OUTPATIENT
Start: 2024-01-16 | End: 2024-01-20 | Stop reason: HOSPADM

## 2024-01-16 RX ADMIN — NITROGLYCERIN 5 MCG/MIN: 20 INJECTION INTRAVENOUS at 02:00

## 2024-01-16 RX ADMIN — CARVEDILOL 25 MG: 25 TABLET, FILM COATED ORAL at 21:06

## 2024-01-16 RX ADMIN — ALBUTEROL SULFATE 2 PUFF: 90 AEROSOL, METERED RESPIRATORY (INHALATION) at 21:28

## 2024-01-16 RX ADMIN — Medication 0.02 MCG/KG/MIN: at 05:00

## 2024-01-16 RX ADMIN — HYDRALAZINE HYDROCHLORIDE 100 MG: 50 TABLET, FILM COATED ORAL at 09:37

## 2024-01-16 RX ADMIN — PANTOPRAZOLE SODIUM 40 MG: 40 TABLET, DELAYED RELEASE ORAL at 09:39

## 2024-01-16 RX ADMIN — ACETAMINOPHEN 975 MG: 325 TABLET ORAL at 14:31

## 2024-01-16 RX ADMIN — HEPARIN SODIUM 5000 UNITS: 5000 INJECTION INTRAVENOUS; SUBCUTANEOUS at 09:39

## 2024-01-16 RX ADMIN — HEPARIN SODIUM 5000 UNITS: 5000 INJECTION INTRAVENOUS; SUBCUTANEOUS at 02:49

## 2024-01-16 RX ADMIN — CARVEDILOL 25 MG: 25 TABLET, FILM COATED ORAL at 09:37

## 2024-01-16 RX ADMIN — ATORVASTATIN CALCIUM 80 MG: 80 TABLET, FILM COATED ORAL at 21:06

## 2024-01-16 RX ADMIN — BENZOCAINE AND MENTHOL 1 LOZENGE: 15; 3.6 LOZENGE ORAL at 15:45

## 2024-01-16 RX ADMIN — ACETAMINOPHEN 975 MG: 325 TABLET ORAL at 21:06

## 2024-01-16 RX ADMIN — GABAPENTIN 300 MG: 300 CAPSULE ORAL at 02:49

## 2024-01-16 RX ADMIN — NOREPINEPHRINE BITARTRATE 0.02 MCG/KG/MIN: 8 INJECTION, SOLUTION INTRAVENOUS at 05:00

## 2024-01-16 RX ADMIN — GABAPENTIN 300 MG: 300 CAPSULE ORAL at 21:06

## 2024-01-16 RX ADMIN — AMLODIPINE BESYLATE 10 MG: 10 TABLET ORAL at 15:34

## 2024-01-16 RX ADMIN — ONDANSETRON 4 MG: 2 INJECTION INTRAMUSCULAR; INTRAVENOUS at 05:11

## 2024-01-16 RX ADMIN — CLONIDINE HYDROCHLORIDE 0.1 MG: 0.1 TABLET ORAL at 21:06

## 2024-01-16 RX ADMIN — CLONIDINE HYDROCHLORIDE 0.1 MG: 0.1 TABLET ORAL at 15:34

## 2024-01-16 RX ADMIN — ATORVASTATIN CALCIUM 80 MG: 80 TABLET, FILM COATED ORAL at 02:50

## 2024-01-16 SDOH — SOCIAL STABILITY: SOCIAL INSECURITY: DO YOU FEEL ANYONE HAS EXPLOITED OR TAKEN ADVANTAGE OF YOU FINANCIALLY OR OF YOUR PERSONAL PROPERTY?: NO

## 2024-01-16 SDOH — SOCIAL STABILITY: SOCIAL INSECURITY: ABUSE: ADULT

## 2024-01-16 SDOH — SOCIAL STABILITY: SOCIAL INSECURITY: DOES ANYONE TRY TO KEEP YOU FROM HAVING/CONTACTING OTHER FRIENDS OR DOING THINGS OUTSIDE YOUR HOME?: NO

## 2024-01-16 SDOH — SOCIAL STABILITY: SOCIAL INSECURITY: WERE YOU ABLE TO COMPLETE ALL THE BEHAVIORAL HEALTH SCREENINGS?: YES

## 2024-01-16 SDOH — SOCIAL STABILITY: SOCIAL INSECURITY: DO YOU FEEL UNSAFE GOING BACK TO THE PLACE WHERE YOU ARE LIVING?: NO

## 2024-01-16 SDOH — SOCIAL STABILITY: SOCIAL INSECURITY: ARE YOU OR HAVE YOU BEEN THREATENED OR ABUSED PHYSICALLY, EMOTIONALLY, OR SEXUALLY BY ANYONE?: NO

## 2024-01-16 SDOH — SOCIAL STABILITY: SOCIAL INSECURITY: HAVE YOU HAD THOUGHTS OF HARMING ANYONE ELSE?: NO

## 2024-01-16 SDOH — SOCIAL STABILITY: SOCIAL INSECURITY: ARE THERE ANY APPARENT SIGNS OF INJURIES/BEHAVIORS THAT COULD BE RELATED TO ABUSE/NEGLECT?: NO

## 2024-01-16 SDOH — SOCIAL STABILITY: SOCIAL INSECURITY: HAS ANYONE EVER THREATENED TO HURT YOUR FAMILY OR YOUR PETS?: NO

## 2024-01-16 ASSESSMENT — COGNITIVE AND FUNCTIONAL STATUS - GENERAL
PATIENT BASELINE BEDBOUND: NO
STANDING UP FROM CHAIR USING ARMS: A LITTLE
MOVING TO AND FROM BED TO CHAIR: A LITTLE
DAILY ACTIVITIY SCORE: 24
MOBILITY SCORE: 19
WALKING IN HOSPITAL ROOM: A LITTLE
CLIMB 3 TO 5 STEPS WITH RAILING: A LITTLE
TURNING FROM BACK TO SIDE WHILE IN FLAT BAD: A LITTLE

## 2024-01-16 ASSESSMENT — PAIN - FUNCTIONAL ASSESSMENT
PAIN_FUNCTIONAL_ASSESSMENT: 0-10
PAIN_FUNCTIONAL_ASSESSMENT: 0-10
PAIN_FUNCTIONAL_ASSESSMENT: NO/DENIES PAIN

## 2024-01-16 ASSESSMENT — ACTIVITIES OF DAILY LIVING (ADL)
DRESSING YOURSELF: INDEPENDENT
WALKS IN HOME: INDEPENDENT
HEARING - RIGHT EAR: FUNCTIONAL
HEARING - LEFT EAR: FUNCTIONAL
LACK_OF_TRANSPORTATION: NO
BATHING: INDEPENDENT
LACK_OF_TRANSPORTATION: NO
JUDGMENT_ADEQUATE_SAFELY_COMPLETE_DAILY_ACTIVITIES: YES
PATIENT'S MEMORY ADEQUATE TO SAFELY COMPLETE DAILY ACTIVITIES?: YES
FEEDING YOURSELF: INDEPENDENT
TOILETING: INDEPENDENT
ADEQUATE_TO_COMPLETE_ADL: YES
GROOMING: INDEPENDENT

## 2024-01-16 ASSESSMENT — LIFESTYLE VARIABLES
SKIP TO QUESTIONS 9-10: 1
HOW OFTEN DO YOU HAVE 6 OR MORE DRINKS ON ONE OCCASION: NEVER
AUDIT-C TOTAL SCORE: 0
AUDIT-C TOTAL SCORE: 0
HOW MANY STANDARD DRINKS CONTAINING ALCOHOL DO YOU HAVE ON A TYPICAL DAY: PATIENT DOES NOT DRINK
HOW OFTEN DO YOU HAVE A DRINK CONTAINING ALCOHOL: NEVER

## 2024-01-16 ASSESSMENT — ENCOUNTER SYMPTOMS: SHORTNESS OF BREATH: 1

## 2024-01-16 ASSESSMENT — PATIENT HEALTH QUESTIONNAIRE - PHQ9
1. LITTLE INTEREST OR PLEASURE IN DOING THINGS: NOT AT ALL
SUM OF ALL RESPONSES TO PHQ9 QUESTIONS 1 & 2: 0
2. FEELING DOWN, DEPRESSED OR HOPELESS: NOT AT ALL

## 2024-01-16 ASSESSMENT — PAIN SCALES - GENERAL
PAINLEVEL_OUTOF10: 0 - NO PAIN
PAINLEVEL_OUTOF10: 3
PAINLEVEL_OUTOF10: 0 - NO PAIN

## 2024-01-16 ASSESSMENT — PAIN DESCRIPTION - DESCRIPTORS: DESCRIPTORS: ACHING

## 2024-01-16 NOTE — NURSING NOTE
VAST consulted for additional PIV access. The right arm was not assessed due to presence of hemodialysis access. The left arm was assessed using ultrasound with multiple vessels visualized leading into the left lower arm cephalic which contains occlusive intraluminal echogenic mass directly below the antecubital fossa. Vessels distal were therefore unable to be accessed.     A 20g PIV is present with vessel purchase in the medial left radial vein. Upon patency check with saline, blood return and easy instillation of flush was noted. Judicious patency assessments recommended due to diminutive vessel size. A 24g PIV was used to cannulate a vein of the dorsal hand with adequate blood return on placement. Cannula positional upon final securement. If venous access beyond current 20g device is required, PIV alternative may be necessary.

## 2024-01-16 NOTE — PROGRESS NOTES
"Stacey Heath is a 53 y.o. female on day 0 of admission presenting with Acute pulmonary edema (CMS/HCC).    Subjective   Ms. Heath is feeling well this AM, no CP, dyspnea, n/v - all resolved.  Discussed clonidine patch to replace BID clonidine, which she declines, stating it was attempted in past but did not work.    HOSPITAL COURSE TO DATE:  Ms. Heath is a 53y F PMH signficant for ESRD on HD Tu Th Sa (presumably secondary to T2DM and HTN), HFpEF who presented to ED with worsening dyspnea; note that she experienced nausea, vomiting, and diarrhea.  She also experienced some substernal \"squeezing\" non-radiating chest pain which was worse on exertion and not sever.  In ED, markedly hypertensive 220/96, was found to have diffuse B lines, started on nitroglycerin gtt for afterload reduction.  Impression is that this is SCAPE; likely HTN is due to n/v (both nausea as a  of HTN and also not keeping down antihypertensive medications due to 4 episodes of emesis day prior).  On arrival to CICU, HTN had improved substantially; underwent ultrafiltration w/ 2L removed; actually briefly required low dose of norepinephrine.  Troponins negative x2, EKG without ischemic changes (evidence of LVH and repolarization abnormality).  No active CP, nausea or other symptoms at the time of AM rounds on 1/16.  Resumed some home medications with good blood pressures.    To Do:  [  ] Patient should have outpatient sleep study to evaluate secondary causes of HTN (discussed w/ patient who agrees).  [  ] Continue to resume home medications as appropriate - clonidine 0.1 BID, valsartan 320mg daily, Imdur 30mg daily, amlodipine 10mg.  [  ] Surface echo.  [  ] There is 10/5/2023 EGD from [facility] showed candida esophagitis; unclear whether this is true finding, as it was not mentioned in the subsequent notes as far as treatment plan.  Should have non-urgent GI evaluation / probably repeat EGD, also workup for CEZAR (impression is this is mixed CEZAR " "and anemia of chronic disease).  [  ] HIV test for esophageal candidiasis (discussed w/ patient).  [  ] Post-transfusion CBC (will get after dialysis is done if she remains in unit).       Objective     Physical Exam  Constitutional:       General: She is not in acute distress.  HENT:      Head: Normocephalic and atraumatic.      Mouth/Throat:      Mouth: Mucous membranes are moist.   Eyes:      Extraocular Movements: Extraocular movements intact.      Pupils: Pupils are equal, round, and reactive to light.   Cardiovascular:      Rate and Rhythm: Normal rate and regular rhythm.      Heart sounds: Murmur heard.      Comments: Systolic ejection murmur at RUSB.  No JVD.  Pulmonary:      Effort: Pulmonary effort is normal.      Breath sounds: Normal breath sounds.   Abdominal:      General: Abdomen is flat.      Palpations: Abdomen is soft.   Musculoskeletal:         General: No swelling.   Skin:     General: Skin is warm and dry.   Neurological:      General: No focal deficit present.      Mental Status: She is alert and oriented to person, place, and time.   Psychiatric:         Mood and Affect: Mood normal.         Last Recorded Vitals  Blood pressure 139/55, pulse 85, temperature 36.4 °C (97.5 °F), temperature source Temporal, resp. rate 21, height 1.397 m (4' 7\"), weight 61.3 kg (135 lb 2.3 oz), SpO2 96 %.  Intake/Output last 3 Shifts:  I/O last 3 completed shifts:  In: 812.8 (13.3 mL/kg) [I.V.:812.8 (13.3 mL/kg)]  Out: - (0 mL/kg)   Weight: 61.3 kg     Relevant Results                Assessment/Plan   Principal Problem:    Acute pulmonary edema (CMS/HCC)    Ms. Heath is a 53 year old female admitted with SCAPE.  PMH significant for HFpEF, resistant HTN, ESRD on HD T, TH, S, T2DM (now off pharmacotherapy).  Her pulm edema quickly improved overnight.  Overall impression is that this is acute HTN caused by (1) nausea (2) not keeping home medications down.  Her BP is now normal while resuming some of her home " medications and off all vasoactive agents.  Impression is that patient was likely hypovolemic with pulmonary edema secondary to increased afterload rather than hypervolemia, hence rapid fall in BP on nitroglycerin gtt and UF (2L fluid removal).  SCAPE was treated with BiPAP and nitroglycerine gtt, and now resuming home meds.  Patient has chronic anemia which is gradually worsening over 6 months and required 1 U RBC transfusion; there is no gross bleeding.  Stable to transfer to regular medical floor with cardiac telemetry.  See system-based A&P below for detail:    NEUROLOGIC: No active issue.     CARDIOVASCULAR  #SCAPE - resolved  #HFpEF  BP 220s/100 on presentation, B lines on ultrasound in ED.  Treated with NIPPV and afterload reduction with rapid improvement.  Suspect markedly elevated blood pressure is (1) driven by nausea (2) did not keep antihypertensives down due to 4 episodes of emesis during the day.  Plan:  - Transfer to floor; breathing comfortably on RA.  - Surface echo ordered to re-assess diastology.    #Resistant HTN  Patient has reportedly labile, difficult to treat HTN and is on numerous antihypertensives.  Regarding secondary HTN workup - she has CTA abdomen 2 months ago which showed no adrenal mass, no renal artery stenosis.  Checked TSH which was normal.  Of course, ESRD may result in resistnent HTN.  Patient does snore and BMI 31, which could suggest JAMSHID; never had sleep study.  Plan:  - Suggest outpatient sleep study.  - Resumed hydralazine 100mg TID, carvedilol 25mg BID.  - Continue to resume home medications as appropriate: 0.1 clonidine BID (offered patch, though pt states this did not work previously), valsartan 320, Imdur 30, amlodipine 10mg.  Held off on resuming all as she was getting dialysis this AM and BP no longer elevated; in first 24h targeting SBP ~150s.    #Chest pain  Suspect that this is due to increased myocardial work from acute increase in afterload.  Reassuring that  troponins are negative, and EKG without any ischemic changes (LVH and some repolarization abnormality).      #DLD  - Cont atorva 80.  - Check lipid panel.    Pulm: No active issue; back on RA breathing comfortably.     RENAL/  #ESRD (T,TH,Sat)  Likely secondary to T2DM.  Nephrology consulted and underwent dialysis on 1/16.     GASTROINTESTINAL  #Nausea / vomiting  Symptoms could suggest a viral GI syndrome.  However, it is concerning that EGD last year apparently showed findings consistent with candida esophagitis, though it does not appear this was ever addressed during her hospitalization at [facility].  The predominant symptom would likely be odynophagia rather than n/v, though it is possibly a symptom of candida esophagitis.  Discussed with patient that if she does have candida esophagitis, would warrant HIV screening, to which she agrees.  At this time, symptoms resolved.    #Gastritis  EGD in 2023 showed gastritis with signs of bleeding.  Plan:  - Continue pantoprazole 40mg.       ENDOCRINE  #T2DM  A1C is normal 4.2% off pharmacotherapy.  Will monitor her BG, SSI if needed.    HEME/ONC  #Chronic normocytic anemia  There is slow decline in Hgb.  Normocytic.  Iron studies with elevated ferritin %sat 17 is consistent with mixed CEZAR and anemia of chronic inflammation.  Folate normal.  Retic index indicates hypoproliferative.    Required 1 unit of RBC for Hgb of 6.0 in CICU.  There is no gross bleeding.  Plan:  - Will continue with weekly aranesp injections as outpatient.  - Follow up MMA.   - Follow up haptoglobin.     INFECTIOUS DISEASE: Follow up HIV test due to concern at [facility] for candida esophagitis.  Otherwise, no clear evidence of active infection.     F:PRN  E: PRN  N: NPO  A:PIV, RUE fistula  DVT ppx: Heparin ppx  GI ppx: Protonix (home med)     Code status: Full code (confirmed on admission)  NOK: Daughter, Diya Jang ([id number]) - [phone]            Rainer Castaneda MD

## 2024-01-16 NOTE — PROGRESS NOTES
Pharmacy Medication History Review    Stacey Heath is a 53 y.o. female admitted for Acute pulmonary edema (CMS/[facility]). Pharmacy reviewed the patient's [id number]-ntzzizbdx medications and allergies for accuracy.    The list below reflects the updated PTA list. Comments regarding how patient may be taking medications differently can be found in the Admit Orders Activity  Prior to Admission Medications   Prescriptions Last Dose Informant   SUMAtriptan (Imitrex) 50 mg tablet     Sig: Take 1 tablet (50 mg) by mouth 1 time if needed for migraine.   acetaminophen (Tylenol) 325 mg tablet     Sig: Take 3 tablets (975 mg) by mouth every 8 hours if needed for mild pain (1 - 3).   albuterol (Ventolin HFA) 90 mcg/actuation inhaler     Sig: Inhale 2 puffs 2 times a day.   albuterol 1.25 mg/3 mL nebulizer solution  Self   Sig: Take 3 mL (1.25 mg) by nebulization every 6 hours if needed for wheezing.   amLODIPine (Norvasc) 10 mg tablet Unknown    Sig: TAKE 1 TABLET BY MOUTH ONCE DAILY BEFORE DIALYSIS   atorvastatin (Lipitor) 80 mg tablet Unknown    Sig: Take 1 tablet (80 mg) by mouth once daily at bedtime.   carvedilol (Coreg) 25 mg tablet Unknown    Sig: Take 1 tablet (25 mg) by mouth 2 times a day.   cloNIDine (Catapres) 0.1 mg tablet Unknown    Sig: Take 1 tablet (0.1 mg) by mouth 2 times a day.   fluticasone propion-salmeteroL (Advair Diskus) 100-50 mcg/dose diskus inhaler Unknown    Sig: Inhale 1 puff once daily.   gabapentin (Neurontin) 300 mg capsule Unknown    Sig: Take 1 capsule (300 mg) by mouth once daily at bedtime.   hydrALAZINE (Apresoline) 100 mg tablet Unknown    Sig: Take 1 tablet (100 mg) by mouth every 8 hours.   hydrOXYzine HCL (Atarax) 10 mg tablet  Self   Sig: Take 1 tablet (10 mg) by mouth every 6 hours if needed for itching.   isosorbide mononitrate ER (Imdur) 30 mg 24 hr tablet Unknown    Sig: Take 1 tablet (30 mg) by mouth once daily. Do not crush or chew.   metoclopramide (Reglan) 5 mg tablet  Self   Sig:  TAKE 1 TABLET BY MOUTH EVERY 6 HOURS AS NEEDED   multivitamin tablet     Sig: Take 1 tablet by mouth once daily.   ondansetron (Zofran) 4 mg tablet     Sig: Take 1 tablet (4 mg) by mouth every 8 hours if needed for nausea or vomiting.   pantoprazole (ProtoNix) 40 mg EC tablet Unknown    Sig: Take 1 tablet (40 mg) by mouth once daily. Do not crush, chew, or split.   valsartan (Diovan) 320 mg tablet Unknown    Sig: Take 1 tablet (320 mg) by mouth once daily.      Facility-Administered Medications: None        The list below reflects the updated allergy list. Please review each documented allergy for additional clarification and justification.  Allergies  Reviewed by Brianna Camp RPh on 1/16/2024        Severity Reactions Comments    Codeine Not Specified Itching     Iodine Not Specified Hives     Shellfish Containing Products Not Specified Swelling SEAFOOD            Patient accepts M2B at discharge. Pharmacy has been updated to [facility].    Sources used to complete the med history include   - out patient fill history, OARRS, and patient interview  - Discharge Summary (1/9/2024)    Below are additional concerns with the patient's PTA list.  - None      Brianna Camp, PharmD  PGY-1 Pharmacy Resident  [facility]  Please reach out via [employer] Secure Chat for questions, or if no response call [employer] or [employer] “MedRec”

## 2024-01-16 NOTE — ED TRIAGE NOTES
Pt to ED c/o shortness of breath that started this morning. Pt also endorsing midsternal chest pain non radiating, constant pain. Pt has PMH of CHF, COPD, HTN & kidney failure, pt had full dialysis treatment on Saturday.

## 2024-01-16 NOTE — H&P
Stacey Heath is a 53 y.o. female on day 0 of admission presenting with Acute pulmonary edema (CMS/HCC).    Subjective   Shortness of Breath      Stacey Heath is a 53 year old female PMH HFpEF, HTN, ESRD on HD T, TH, S, presenting to the CICU due to hypertensive emergency, flash pulmonary edema, on BIPAP. Patient reports that she has had chest pain, dyspnea for the past day. Reports checking blood pressure at home which was elevated to the high 190s systolic. She reports additionally having a dry cough associated with symptoms and orthopnea. She describes chest pain as midline aching and non radiating. Prior to this she reports chest pain on exertion that is relieved by rest for the past several weeks. She additionally had a headache with sx onset. She had dialysis scheduled on Saturday and reports not missing the session. Patient was previously admitted to CICU for similar presentation, full hospital course is below. Also several admissions throughout the last year for similar presentation (one at [facility] on 12/14 as well) with hypertensive emergency, flash pulm edema, necessitating dialysis.    Prior hospital course (discharged on 1/9)  Stacey Heath is a 53 year old female PMH HFpEF, HTN, ESRD on HD T, TH, S, presenting to the CICU for hypertensive emergency, complaining of one day of dyspnea, chest pain, and constitutional symptoms. Patient is on an extensive home regimen for HTN management and denies missing any doses. She was scheduled for dialysis this morning however came to the ED because she has been feeling poorly.  She was diagnosed with respiratory failure due to volume overload and hypertensive emergency.  She was started on a nicardipine drip and given dialysis on 1/4. The nicardipine was able to be discontinued on the morning of 1/5 and she was restarted on home meds.     -           BP has been labile.  But better controlled when she is on her meds.    -           Discussed with nephro plans as patient  is limiting how much volume can be removed with each section states he cannot tolerate more than 2 L, had 1.5 yesterday despite nephrology wanting to take off 3 L.  So this is going to delay her recovery.  When she is off oxygen she can discharge to outpatient follow-up to continue to work on her volume management.  -           Dispo is home    Review of Systems   Respiratory:  Positive for shortness of breath.    All other systems reviewed and are negative.    ED Course:   Vitals: 220/96  RR18 Afeb, (no saturation in chart, although patient was placed on bipap)  Labs:   RFP: Cr 9.28 Bun 36 K 4.6   LFTS: unremarkable  BNP: 2505  Trop negative x2  CBC: Wbc 8.2 Hgb 7.0 Plt 14.2  VB.42 Co2 29 Po2 44    Patient was pocused in the ED noted to be volume overloaded. Started on BIPAP (likely due to WOB?), given her oral home medications of amlodipine, coreg, clonidine, hydralazine, imdur, valsartan, given 60 IV lasix (makes minimal urine) and placed on a nitroglycerin drip. Upon arrival to CICU patient was able to be weaned off nitroglycerin drip. Nephrology consulted for dialysis (ZOILA alvarado). Chest xray ordered and pending    No current facility-administered medications on file prior to encounter.     Current Outpatient Medications on File Prior to Encounter   Medication Sig Dispense Refill    acetaminophen (Tylenol) 325 mg tablet Take 3 tablets (975 mg) by mouth every 8 hours if needed for mild pain (1 - 3). 30 tablet 0    albuterol (Ventolin HFA) 90 mcg/actuation inhaler Inhale 2 puffs 2 times a day. 18 g 11    albuterol 1.25 mg/3 mL nebulizer solution Take 3 mL (1.25 mg) by nebulization every 6 hours if needed for wheezing. 90 mL 11    amLODIPine (Norvasc) 10 mg tablet TAKE 1 TABLET BY MOUTH ONCE DAILY BEFORE DIALYSIS 30 tablet 2    atorvastatin (Lipitor) 80 mg tablet Take 1 tablet (80 mg) by mouth once daily at bedtime. 30 tablet 0    carvedilol (Coreg) 25 mg tablet Take 1 tablet (25 mg) by mouth 2 times a  day. 60 tablet 0    cloNIDine (Catapres) 0.1 mg tablet Take 1 tablet (0.1 mg) by mouth 2 times a day. 60 tablet 0    fluticasone propion-salmeteroL (Advair Diskus) 100-50 mcg/dose diskus inhaler Inhale 1 puff once daily. 60 each 0    gabapentin (Neurontin) 300 mg capsule Take 1 capsule (300 mg) by mouth once daily at bedtime. 30 capsule 0    hydrALAZINE (Apresoline) 100 mg tablet Take 1 tablet (100 mg) by mouth every 8 hours. 90 tablet 0    hydrOXYzine HCL (Atarax) 10 mg tablet Take 1 tablet (10 mg) by mouth every 6 hours if needed for itching. 30 tablet 1    isosorbide mononitrate ER (Imdur) 30 mg 24 hr tablet Take 1 tablet (30 mg) by mouth once daily. Do not crush or chew. 30 tablet 0    metoclopramide (Reglan) 5 mg tablet TAKE 1 TABLET BY MOUTH EVERY 6 HOURS AS NEEDED 40 tablet 0    multivitamin tablet Take 1 tablet by mouth once daily. 30 tablet 0    ondansetron (Zofran) 4 mg tablet Take 1 tablet (4 mg) by mouth every 8 hours if needed for nausea or vomiting. 30 tablet 0    pantoprazole (ProtoNix) 40 mg EC tablet Take 1 tablet (40 mg) by mouth once daily. Do not crush, chew, or split. 30 tablet 0    SUMAtriptan (Imitrex) 50 mg tablet Take 1 tablet (50 mg) by mouth 1 time if needed for migraine. 30 tablet 3    valsartan (Diovan) 320 mg tablet Take 1 tablet (320 mg) by mouth once daily. 30 tablet 0         Social  -Previously smoking 1/3 PPD, quit 2 years ago  -Denies EtOH  -Denies illicit drugs     Past Medical History: As above    Family History: Non contrib     Past cardiac workup:  Echo 6/2023:  CONCLUSIONS:  1. Left ventricular systolic function is normal with a 55-60% estimated ejection fraction.  2. Moderately increased left ventricular septal thickness.  3. Spectral Doppler shows a pseudonormal pattern of left ventricular diastolic filling.  4. The left ventricular posterior wall thickness is moderately increased.  5. There is an elevated mean left atrial pressure.  6. There is severe concentric left  "ventricular hypertrophy.  7. The left atrium is moderately dilated.  8. There is moderate mitral annular calcification.     Left and Right heart catheterization 3/2022:  1. Mild non-obstructive coronary artery disease.  2. Mild pulmonary hypertension with elevated left sided filling pressures (PCWP of 30mmHg)  preserved to high CI/CO  non-obstructive mild coronary  R dominant system.       Allergies:  Allergies   Allergen Reactions    Codeine Itching    Iodine Hives    Shellfish Containing Products Swelling     SEAFOOD             Objective   Vitals 24 hour ranges:  Heart Rate:  []   Temp:  [37.2 °C (99 °F)]   Resp:  [7-25]   BP: (123-238)/()   Height:  [139.7 cm (4' 7\")]   Weight:  [55.8 kg (123 lb)]   SpO2:  [96 %-100 %]     Physical Exam:  Physical Exam  Constitutional:       Appearance: Normal appearance.   HENT:      Head: Normocephalic and atraumatic.      Right Ear: There is no impacted cerumen.      Mouth/Throat:      Mouth: Mucous membranes are dry.      Pharynx: No oropharyngeal exudate.   Eyes:      Extraocular Movements: Extraocular movements intact.      Pupils: Pupils are equal, round, and reactive to light.   Cardiovascular:      Rate and Rhythm: Normal rate and regular rhythm.      Heart sounds: No murmur heard.  Pulmonary:      Effort: Pulmonary effort is normal.      Breath sounds: Normal breath sounds.   Abdominal:      General: Abdomen is flat. There is no distension.      Palpations: Abdomen is soft.   Genitourinary:     General: Normal vulva.   Musculoskeletal:         General: Normal range of motion.      Cervical back: Normal range of motion.   Skin:     General: Skin is warm.      Capillary Refill: Capillary refill takes less than 2 seconds.   Neurological:      Mental Status: She is alert.   Psychiatric:         Mood and Affect: Mood normal.         Intake/Output last 3 Shifts:    Intake/Output Summary (Last 24 hours) at 1/16/2024 0305  Last data filed at 1/15/2024 2112  Gross " per 24 hour   Intake 5.05 ml   Output --   Net 5.05 ml       LDA:         Vent settings:  FiO2 (%):  [28 %-30 %] 28 %  S RR:  [16] 16    Medications  albuterol, 2 puff, inhalation, BID  amLODIPine, 10 mg, oral, Daily  atorvastatin, 80 mg, oral, Nightly  carvedilol, 25 mg, oral, BID  cloNIDine, 0.1 mg, oral, BID  fluticasone furoate-vilanteroL, 1 puff, inhalation, Daily  gabapentin, 300 mg, oral, Nightly  heparin, 5,000 Units, subcutaneous, q8h  hydrALAZINE, 100 mg, oral, q8h  isosorbide mononitrate ER, 30 mg, oral, Daily  pantoprazole, 40 mg, oral, Daily  valsartan, 320 mg, oral, Daily      nitroglycerin, 5-200 mcg/min, Last Rate: Stopped (01/16/24 0222)      PRN medications: oxygen, SUMAtriptan    Lab Results  Results for orders placed or performed during the hospital encounter of 01/15/24 (from the past 24 hour(s))   ECG 12 lead   Result Value Ref Range    Ventricular Rate 102 BPM    Atrial Rate 102 BPM    ME Interval 142 ms    QRS Duration 80 ms    QT Interval 334 ms    QTC Calculation(Bazett) 435 ms    P Axis 54 degrees    R Axis 47 degrees    T Axis 116 degrees    QRS Count 17 beats    Q Onset 220 ms    P Onset 149 ms    P Offset 199 ms    T Offset 387 ms    QTC Fredericia 398 ms   Comprehensive metabolic panel   Result Value Ref Range    Glucose 83 74 - 99 mg/dL    Sodium 143 136 - 145 mmol/L    Potassium 4.6 3.5 - 5.3 mmol/L    Chloride 101 98 - 107 mmol/L    Bicarbonate 31 21 - 32 mmol/L    Anion Gap 16 10 - 20 mmol/L    Urea Nitrogen 36 (H) 6 - 23 mg/dL    Creatinine 9.28 (H) 0.50 - 1.05 mg/dL    eGFR 5 (L) >60 mL/min/1.73m*2    Calcium 9.3 8.6 - 10.6 mg/dL    Albumin 3.5 3.4 - 5.0 g/dL    Alkaline Phosphatase 124 (H) 33 - 110 U/L    Total Protein 7.1 6.4 - 8.2 g/dL    AST 12 9 - 39 U/L    Bilirubin, Total 0.5 0.0 - 1.2 mg/dL    ALT 12 7 - 45 U/L   CBC   Result Value Ref Range    WBC 8.2 4.4 - 11.3 x10*3/uL    nRBC 0.0 0.0 - 0.0 /100 WBCs    RBC 2.40 (L) 4.00 - 5.20 x10*6/uL    Hemoglobin 7.0 (L) 12.0 -  16.0 g/dL    Hematocrit 21.7 (L) 36.0 - 46.0 %    MCV 90 80 - 100 fL    MCH 29.2 26.0 - 34.0 pg    MCHC 32.3 32.0 - 36.0 g/dL    RDW 14.2 11.5 - 14.5 %    Platelets 222 150 - 450 x10*3/uL   B-type natriuretic peptide   Result Value Ref Range    BNP 2,505 (H) 0 - 99 pg/mL   Magnesium   Result Value Ref Range    Magnesium 2.34 1.60 - 2.40 mg/dL   Phosphorus   Result Value Ref Range    Phosphorus 4.9 2.5 - 4.9 mg/dL   Troponin I, High Sensitivity, Initial   Result Value Ref Range    Troponin I, High Sensitivity 32 0 - 34 ng/L   Troponin, High Sensitivity, 1 Hour   Result Value Ref Range    Troponin I, High Sensitivity 34 0 - 34 ng/L   POCT GLUCOSE   Result Value Ref Range    POCT Glucose 79 74 - 99 mg/dL           Imaging Results:       Assessment/Plan   53 year old female PMH HFpEF, HTN, ESRD on HD T, TH, S, presenting to the CICU for hypertensive emergency, flash pulmonary edema, on BIPAP. Patient is on an extensive home regimen for HTN management and denies missing any doses. Reports compliance with dialysis as well. She does report high blood pressure prior to arrival likely flash pulm edema due to uncontrolled htn, ESRD, overall with element of Hfpef. No ischemic changes on EKG with negative troponin's, less concern for ACS, although given history of chest pain and chest pain on exertion (likely due to demand) will get repeat echocardiogram to evaluate for RWMA or any reduced EF.     NEUROLOGIC  NTD    CARDIOVASCULAR/PULM  #Hypertensive emergency  #Flash pulmonary edema  #COPD  #Hfpef  #Chest pain  #ESRD  Plan:  -Continue patient on home blood pressure regimen: Amlodipine 10, hydralazine 100 mg TID, coreg 25 mg BID, Valsartan 320 Daily, Clonidine .1mg BID, Imdur 30  -Goal blood pressure 150 systolic for first 24 hours, wean nitroglycerin drip as tolerated  -Nephrology consulted for dialysis  -Cont home albuterol PRN and breo ellipta  -Cont home atorvastatin 80  -Repeat Echocardiogram ordered --Last one in  6/2023    RENAL/  #ESRD (T,TH,Sat)  ::Reportedly makes minimal urine  Plan:  -As above  -Nephrology consulted, pending dialysis today  -Electrolytes WNL, CTM  -Daily RFP, renally dose meds with iHD    GASTROINTESTINAL  NTD    ENDOCRINE    HEME/ONC  #Chronic anemia i/s/o renal failure   -Daily CBCs, Hgb on admission 7  -Will continue with weekly aranesp injections as outpatient     INFECTIOUS DISEASE  NTD    F:PRN  E: PRN  N: NPO  A:PIV, RUE fistula  DVT ppx: Heparin  GI ppx: Protonix    Code status: Full code (confirmed on admission)  NOK: Daughter, Diya Jang ([id number]) - [phone]        Loretta Vázquez MD

## 2024-01-16 NOTE — PROGRESS NOTES
Physical Therapy                 Therapy Communication Note    Patient Name: Stacey Heath  MRN: [id number]  Today's Date: 1/16/2024     Discipline: Physical Therapy    Missed Visit Reason: Missed Visit Reason: Other (Comment) (Patient with low hemoglobin and hematocrit, not appropriate ofr mobility at this time. Will re-attempt as Pt becomes medically appropriate.)    Missed Time: Attempt    Comment:

## 2024-01-16 NOTE — ED PROVIDER NOTES
HPI   Chief Complaint   Patient presents with    Shortness of Breath       Stacey Heath is a 52yo F w/ PMH HFPEF (EF 55-60%), COPD, HTN, ESRD (Tu/Th/Sat dialysis) here complaining of SOB, chest pain. SOB began ~6:30pm today, onset while sitting on her bed, has become progressively worse since then. Did not improve with duonebs. Also with dry cough, onset at same time, coughing till vomiting a few times. Chest pain is squeezing at midline, also began at 6:30.     Also complaining of headache since this morning. Checks BP at home, systolic >200 at 4pm today. She reports she is consistent in taking her medications. Her last dialysis was Saturday and is next scheduled for dialysis tomorrow morning    Reports similar episode 2wks ago of SOB and chest pain. Was admitted with pulmonary edema to the CICU at that time.                      Monster Coma Scale Score: 15                  Patient History   Past Medical History:   Diagnosis Date    Breast pain 05/18/2021    History of breast pain    Dependence on renal dialysis (CMS/[facility]ly [facility]) 11/21/2022    Hemodialysis patient    Dry eye syndrome of bilateral lacrimal glands 03/07/2017    Dry eyes    Essential (primary) hypertension 12/27/2022    Hypertension    History of acute pancreatitis 12/21/2020    History of acute pancreatitis    Migraines     History of migraine    Other conditions influencing health status 09/01/2021    History of nephrostomy    Unspecified diastolic (congestive) heart failure (CMS/[facility]) 11/21/2022    Heart failure with preserved left ventricular function (HFpEF)    Vaginal dryness 07/29/2022    Vaginal dryness     Past Surgical History:   Procedure Laterality Date    APPENDECTOMY  03/07/2017    Appendectomy    CT ABDOMEN ANGIOGRAM W AND/OR WO IV CONTRAST  4/23/2023    CT ABDOMEN ANGIOGRAM W AND/OR WO [place] CONTRAST [facility]    IR VENOGRAM DIALYSIS  8/5/2021    IR VENOGRAM DIALYSIS 8/5/2021    OTHER SURGICAL HISTORY  03/07/2017    Cystoscopy With Pyeloscopy With  Removal Of Calculus    OTHER SURGICAL HISTORY  03/07/2017    Anoscopy For Polyp Removal    OTHER SURGICAL HISTORY  12/08/2021    Arteriovenous fistula creation procedure    OTHER SURGICAL HISTORY  12/08/2021    Dialysis tunneled catheter placement    TUBAL LIGATION  03/07/2017    Tubal Ligation     Family History   Problem Relation Name Age of Onset    Other (Cerebrovascular Accident) Father      Heart failure Paternal Grandmother      Breast cancer Paternal Grandmother      Other (Primary Cervical Cancer) Paternal Grandmother      Diabetes Paternal Grandfather       Social History     Tobacco Use    Smoking status: Never    Smokeless tobacco: Never   Vaping Use    Vaping Use: Never used   Substance Use Topics    Alcohol use: Never    Drug use: Never       Physical Exam   ED Triage Vitals [01/15/24 1914]   Temp Heart Rate Resp BP   37.2 °C (99 °F) 107 18 (!) 220/96      SpO2 Temp src Heart Rate Source Patient Position   100 % 3LNC -- -- --      BP Location FiO2 (%)     -- --       Physical Exam  Constitutional:       General: She is in acute distress.      Appearance: She is ill-appearing. She is not toxic-appearing or diaphoretic.      Interventions: She is not intubated.  HENT:      Head: Normocephalic and atraumatic.   Eyes:      Extraocular Movements: Extraocular movements intact.   Cardiovascular:      Rate and Rhythm: Regular rhythm. Tachycardia present.      Comments: 3/6 systolic ejection murmur at R sternal border  Pulmonary:      Effort: Tachypnea, accessory muscle usage and respiratory distress present. She is not intubated.      Breath sounds: No stridor. Examination of the right-upper field reveals rales. Examination of the left-upper field reveals rales. Examination of the right-middle field reveals wheezing and rales. Examination of the left-middle field reveals wheezing and rales. Examination of the right-lower field reveals wheezing and rales. Examination of the left-lower field reveals wheezing and  rales. Wheezing and rales present.      Comments: Dry cough  Chest:      Chest wall: No deformity or tenderness.   Abdominal:      Palpations: There is no mass.      Tenderness: There is no abdominal tenderness. There is no guarding or rebound.   Musculoskeletal:      Cervical back: Normal range of motion.      Right lower leg: No edema.      Left lower leg: No edema.   Skin:     General: Skin is warm and dry.   Neurological:      Mental Status: She is oriented to person, place, and time.     EKG: ST & T wave abnormality, consider lateral ischemia. No significant change was found from 4Jan.     POCUS Chest: Prominent B lines L lung. Heart with appropriate motion, no evidence of pericardial effusion.    BNP 2200 15Jan: 2505    ED Course & MDM   Diagnoses as of 01/15/24 2300   Acute pulmonary edema (CMS/HCC)       Medical Decision Making  Stacey Heath is a 52yo F presenting with SOB and chest pain. She has pulmonary edema confirmed by POCUS, likely due to fluid overload from a combination of HF and ESRD.  Patient did state that she made a small amount of urine still, so was given IV diuretics.  Started on a nitroglycerin drip.  Patient was then given all of her home p.o. antihypertensive medicines.  Patient was started on BiPAP secondary to her increased work of breathing.  Labs notable for a stable troponin without evidence of significant elevation, given patient's EKG and POCUS findings combined with this, local nuchal concern for an ischemic cause for the patient's acute decompensation and flash pulmonary edema.  Patient did have a significant BNP elevation to greater than 2500 again consistent with flash pulmonary edema.  No grossly swollen right or left lower extremities, and given patient's overall history and clinical gestalt, suspect the pulmonary edema is the primary finding and clinical concern for pulmonary embolus is low.  Patient did significantly clinically improved with management as above.  However,  had persistent hypotension and was discussed with the ICU attending Dr. Garcia and will be admitted in stable condition for further evaluation management of her acute hypertensive emergency and volume overload.        Procedure    Performed by: Geovany Castro MD MPH  Authorized by: Semaj Cramer MD  Cardiac Indications: fluid overload              Respiratory Indications: shortness of breath  Procedure: Cardiac Ultrasound    Findings:   Views: parasternal long, parasternal short, apical four and subxiphoid  Effusion: The pericardial space was visualized and was positive for a PERICARDIAL EFFUSION.  Activity: Ventricular contractions were visualized.  LV: LV systolic function was NORMAL.  RV: RV size was NORMAL.    Impression:  Cardiac: The focused cardiac ultrasound exam was NORMAL.  Procedure: Thoracic Ultrasound    Findings:  R Lung Sliding: The RIGHT chest was evaluated and LUNG SLIDING was visualized.  L Lung Sliding: The LEFT chest was evaluated and LUNG SLIDING was visualized.  R Effusion: The RIGHT chest was evaluated and there was NO PLEURAL EFFUSION.  L Effusion: The LEFT chest was evaluated and there was NO PLEURAL EFFUSION.  A-lines: The RIGHT chest was evaluated and there were NO A-LINES visualized  B-lines: The RIGHT chest was evaluated and multiple B-LINES were visualized  R Consolidation: The RIGHT chest was evaluated and there was NO RIGHT CONSOLIDATION.  L Consolidation: The LEFT chest was evaluated and there was NO LEFT CONSOLIDATION.    Impression:  Thorax: The focused thoracic ultrasound exam had ABNORMAL findings as specified.      Critical Care    Performed by: Geovany Castro MD MPH  Authorized by: Geovany Castro MD MPH    Critical care provider statement:     Critical care time (minutes):  45    Critical care time was exclusive of:  Separately billable procedures and treating other patients and teaching time    Critical care was necessary to treat or prevent imminent or  life-threatening deterioration of the following conditions:  Respiratory failure    Critical care was time spent personally by me on the following activities:  Discussions with consultants, examination of patient, evaluation of patient's response to treatment, development of treatment plan with patient or surrogate, interpretation of cardiac output measurements, review of old charts, re-evaluation of patient's condition, pulse oximetry, ordering and review of radiographic studies, ordering and review of laboratory studies and ordering and performing treatments and interventions    I assumed direction of critical care for this patient from another provider in my specialty: no      Care discussed with: admitting provider         Semaj Cramer MD  Resident  01/16/24 0203      -------------------------------------------  This patient was seen by the resident physician.  I have seen and examined the patient, agree with the workup, evaluation, management and diagnosis. I reviewed and edited the above documentation where necessary.     I have looked at the EKG and agree with the interpretation.    I was present during all critical and key portions of the procedure(s) and immediately available to furnish services the entire duration.  See resident note for details.     Geovany Castro MD   Attending Physician      Geovany Castro MD MPH  01/17/24 9242

## 2024-01-16 NOTE — ED PROCEDURE NOTE
Procedure  General    Performed by: Geovany Castro MD MPH  Authorized by: Geovany Castro MD MPH      There was difficulty obtaining IV access on this patient, and multiple attempts by nursing staff and/or medics have failed. Patient required IV access by ED provider under the assistance of ultrasound.      Site was not prepped and sterilized before IV insertion.     Ultrasound images were not saved in the patient's chart demonstrating cannulization.     IV Size:20  IV Side: Left  IV Site: Forearm    Geovany Castro MD MPH             Geovany Castro MD MPH  01/15/24 6225

## 2024-01-16 NOTE — CONSULTS
Reason For Consult  ESRD management     History Of Present Illness  Stacey Heath is a 53 y.o. female with PMHX of ESRD TTS via RUE AVF is presenting for SOB and HTN emergency requiring nitroglycerin gtt and continuous bipap. Patient admitted to the CICU. On call nephrology contacted for IUF overnight - patient underwent IUF with 2L removed, however, needed levophed intermittently during dialysis to help with hypotension.     Patient seen and examined this AM  She is off of levophed and/or nitroglycerine gtt   She is additionally off of Bipap   She states she feels well after 2L removed - she has not been able to take fluid off at her outpatient unit due to cramps and being under her DW.   Goes to [facility] on [facility]  Outpatient nephrologist - Dr. Garcia      Past Medical History  She has a past medical history of Breast pain (05/18/2021), Dependence on renal dialysis (CMS/[facility]) (11/21/2022), Dry eye syndrome of bilateral lacrimal glands (03/07/2017), Essential (primary) hypertension (12/27/2022), History of acute pancreatitis (12/21/2020), Migraines, Other conditions influencing health status (09/01/2021), Unspecified diastolic (congestive) heart failure (CMS/[facility]) (11/21/2022), and Vaginal dryness (07/29/2022).    Surgical History  She has a past surgical history that includes Tubal ligation (03/07/2017); Other surgical history (03/07/2017); Appendectomy (03/07/2017); Other surgical history (03/07/2017); Other surgical history (12/08/2021); Other surgical history (12/08/2021); CT angio abdomen w and or wo IV IV contrast (4/23/2023); and IR venogram dialysis (8/5/2021).     Social History  She reports that she has never smoked. She has never used smokeless tobacco. She reports that she does not drink alcohol and does not use drugs.    Family History  Family History   Problem Relation Name Age of Onset    Other (Cerebrovascular Accident) Father      Heart failure Paternal Grandmother      Breast cancer Paternal  "Grandmother      Other (Primary Cervical Cancer) Paternal Grandmother      Diabetes Paternal Grandfather          Allergies  Codeine, Iodine, and Shellfish containing products       Physical Exam  AAO x3  CTA b/l   RRR  RUE AVF - palpable thrill and bruit       I&O 24HR    Intake/Output Summary (Last 24 hours) at 1/16/2024 1509  Last data filed at 1/16/2024 1449  Gross per 24 hour   Intake 1898.8 ml   Output 734 ml   Net 1164.8 ml       Vitals 24HR  Heart Rate:  []   Temp:  [35.5 °C (95.9 °F)-37.2 °C (99 °F)]   Resp:  [7-25]   BP: ()/()   Height:  [139.7 cm (4' 7\")]   Weight:  [55.8 kg (123 lb)-61.8 kg (136 lb 3.9 oz)]   SpO2:  [84 %-100 %]        Assessment/Plan   Stacey Heath is a 53 y.o. female with PMHX of ESRD TTS via RUE AVF is presenting for SOB and HTN emergency requiring nitroglycerin gtt and continuous bipap. Patient admitted to the CICU. Nephrology is consulted for ESRD management.     Overnight patient under IUF with 2L removed  DW 54.2 kg    Principal Problem:    Acute pulmonary edema (CMS/HCC)    Recommendations:  - will plan for HD today as per submitted orders. Patient was on for less than 3 hours and wanted to come off.   - Renal MVI  - standing weight post dialysis to assess new DW. This needs to be adjusted.  - phos binder TID with meals   - renal diet   - will follow       Karin Rivera DO    "

## 2024-01-16 NOTE — NURSING NOTE
Discharge Progress Note  6/21/2021 2:55 PM    Data:  Discharge order received. Action:  IV D/C'd without difficulty. See Doc Flowsheets for assessment. Patient given discharge instructions with prescriptions. Response:  Patient verbalized understanding of discharge instructions. Patient left with all belongings.     Micheal Butler RN  ________________________________________________________________________ [facility] [place] [id number] [facility] She was oriented to the environment and the call bell system. Outstanding admission screenings completed. Patient placed on 2 liters of oxygen because she was sating 82% on room air. She came up to 97% afterwards on 2 L. She was placed on the continuous pulse ox monitor as well. She stated she lives at home with her fiance. She feels safe at home. She does have oxygen already at home and wears 2 liters as needed. No other home care or equipment needed prior to admission per pt. Will continue to assess for discharge needs

## 2024-01-16 NOTE — HOSPITAL COURSE
Ms. Heath is a 53y F PMH signficant for ESRD on HD Tu Th Sa (presumably secondary to T2DM and HTN), HFpEF who presented to ED with worsening dyspnea and chest pain in setting of nausea, vomiting, and diarrhea.  She is also having URI sx. n ED, markedly hypertensive 220/96, was found to have diffuse B lines, started on nitroglycerin gtt for afterload reduction.  On arrival to CICU, HTN had improved substantially.  Troponins negative x2, EKG without ischemic changes (evidence of LVH and repolarization abnormality).  No active CP, nausea or other symptoms, patient was resumed on home medications with improved blood pressure.  She was anemic and transfused 2 units of blood.  Now stable and transferred to the medical floor.

## 2024-01-17 ENCOUNTER — DOCUMENTATION (OUTPATIENT)
Dept: BEHAVIORAL HEALTH | Facility: CLINIC | Age: 54
End: 2024-01-17
Payer: COMMERCIAL

## 2024-01-17 LAB
ALBUMIN SERPL BCP-MCNC: 3.6 G/DL (ref 3.4–5)
ANION GAP SERPL CALC-SCNC: 18 MMOL/L (ref 10–20)
BLOOD EXPIRATION DATE: NORMAL
BUN SERPL-MCNC: 31 MG/DL (ref 6–23)
CALCIUM SERPL-MCNC: 8.9 MG/DL (ref 8.6–10.6)
CHLORIDE SERPL-SCNC: 99 MMOL/L (ref 98–107)
CO2 SERPL-SCNC: 27 MMOL/L (ref 21–32)
CREAT SERPL-MCNC: 7.25 MG/DL (ref 0.5–1.05)
DISPENSE STATUS: NORMAL
EGFRCR SERPLBLD CKD-EPI 2021: 6 ML/MIN/1.73M*2
ERYTHROCYTE [DISTWIDTH] IN BLOOD BY AUTOMATED COUNT: 15.9 % (ref 11.5–14.5)
GLUCOSE SERPL-MCNC: 91 MG/DL (ref 74–99)
HAPTOGLOB SERPL-MCNC: 211 MG/DL (ref 37–246)
HCT VFR BLD AUTO: 29.1 % (ref 36–46)
HGB BLD-MCNC: 9.4 G/DL (ref 12–16)
MCH RBC QN AUTO: 31 PG (ref 26–34)
MCHC RBC AUTO-ENTMCNC: 32.3 G/DL (ref 32–36)
MCV RBC AUTO: 96 FL (ref 80–100)
NRBC BLD-RTO: 0 /100 WBCS (ref 0–0)
PHOSPHATE SERPL-MCNC: 5.1 MG/DL (ref 2.5–4.9)
PLATELET # BLD AUTO: 251 X10*3/UL (ref 150–450)
POTASSIUM SERPL-SCNC: 5.1 MMOL/L (ref 3.5–5.3)
PRODUCT BLOOD TYPE: 9500
PRODUCT CODE: NORMAL
RBC # BLD AUTO: 3.03 X10*6/UL (ref 4–5.2)
SODIUM SERPL-SCNC: 139 MMOL/L (ref 136–145)
UNIT ABO: NORMAL
UNIT NUMBER: NORMAL
UNIT RH: NORMAL
UNIT VOLUME: 286
WBC # BLD AUTO: 5.7 X10*3/UL (ref 4.4–11.3)
XM INTEP: NORMAL

## 2024-01-17 PROCEDURE — 2500000001 HC RX 250 WO HCPCS SELF ADMINISTERED DRUGS (ALT 637 FOR MEDICARE OP): Performed by: STUDENT IN AN ORGANIZED HEALTH CARE EDUCATION/TRAINING PROGRAM

## 2024-01-17 PROCEDURE — 2500000002 HC RX 250 W HCPCS SELF ADMINISTERED DRUGS (ALT 637 FOR MEDICARE OP, ALT 636 FOR OP/ED): Performed by: STUDENT IN AN ORGANIZED HEALTH CARE EDUCATION/TRAINING PROGRAM

## 2024-01-17 PROCEDURE — 1210000001 HC SEMI-PRIVATE ROOM DAILY

## 2024-01-17 PROCEDURE — 80069 RENAL FUNCTION PANEL: CPT | Performed by: STUDENT IN AN ORGANIZED HEALTH CARE EDUCATION/TRAINING PROGRAM

## 2024-01-17 PROCEDURE — 94640 AIRWAY INHALATION TREATMENT: CPT

## 2024-01-17 PROCEDURE — 36415 COLL VENOUS BLD VENIPUNCTURE: CPT | Performed by: STUDENT IN AN ORGANIZED HEALTH CARE EDUCATION/TRAINING PROGRAM

## 2024-01-17 PROCEDURE — 2500000004 HC RX 250 GENERAL PHARMACY W/ HCPCS (ALT 636 FOR OP/ED): Performed by: STUDENT IN AN ORGANIZED HEALTH CARE EDUCATION/TRAINING PROGRAM

## 2024-01-17 PROCEDURE — 99233 SBSQ HOSP IP/OBS HIGH 50: CPT | Performed by: STUDENT IN AN ORGANIZED HEALTH CARE EDUCATION/TRAINING PROGRAM

## 2024-01-17 PROCEDURE — 85027 COMPLETE CBC AUTOMATED: CPT | Performed by: STUDENT IN AN ORGANIZED HEALTH CARE EDUCATION/TRAINING PROGRAM

## 2024-01-17 PROCEDURE — 97161 PT EVAL LOW COMPLEX 20 MIN: CPT | Mod: GP

## 2024-01-17 RX ADMIN — ALBUTEROL SULFATE 2 PUFF: 90 AEROSOL, METERED RESPIRATORY (INHALATION) at 20:57

## 2024-01-17 RX ADMIN — ATORVASTATIN CALCIUM 80 MG: 80 TABLET, FILM COATED ORAL at 20:56

## 2024-01-17 RX ADMIN — BENZOCAINE AND MENTHOL 1 LOZENGE: 15; 3.6 LOZENGE ORAL at 23:55

## 2024-01-17 RX ADMIN — CLONIDINE HYDROCHLORIDE 0.1 MG: 0.1 TABLET ORAL at 09:42

## 2024-01-17 RX ADMIN — AMLODIPINE BESYLATE 10 MG: 10 TABLET ORAL at 09:42

## 2024-01-17 RX ADMIN — HEPARIN SODIUM 5000 UNITS: 5000 INJECTION INTRAVENOUS; SUBCUTANEOUS at 09:43

## 2024-01-17 RX ADMIN — PANTOPRAZOLE SODIUM 40 MG: 40 TABLET, DELAYED RELEASE ORAL at 09:42

## 2024-01-17 RX ADMIN — ALBUTEROL SULFATE 2 PUFF: 90 AEROSOL, METERED RESPIRATORY (INHALATION) at 11:28

## 2024-01-17 RX ADMIN — BENZOCAINE AND MENTHOL 1 LOZENGE: 15; 3.6 LOZENGE ORAL at 05:53

## 2024-01-17 RX ADMIN — HEPARIN SODIUM 5000 UNITS: 5000 INJECTION INTRAVENOUS; SUBCUTANEOUS at 01:45

## 2024-01-17 RX ADMIN — ACETAMINOPHEN 975 MG: 325 TABLET ORAL at 18:05

## 2024-01-17 RX ADMIN — HEPARIN SODIUM 5000 UNITS: 5000 INJECTION INTRAVENOUS; SUBCUTANEOUS at 18:05

## 2024-01-17 RX ADMIN — BENZOCAINE AND MENTHOL 1 LOZENGE: 15; 3.6 LOZENGE ORAL at 16:36

## 2024-01-17 RX ADMIN — CARVEDILOL 25 MG: 25 TABLET, FILM COATED ORAL at 09:42

## 2024-01-17 RX ADMIN — HYDRALAZINE HYDROCHLORIDE 100 MG: 50 TABLET, FILM COATED ORAL at 09:42

## 2024-01-17 RX ADMIN — CLONIDINE HYDROCHLORIDE 0.1 MG: 0.1 TABLET ORAL at 20:56

## 2024-01-17 RX ADMIN — HYDRALAZINE HYDROCHLORIDE 100 MG: 50 TABLET, FILM COATED ORAL at 01:45

## 2024-01-17 RX ADMIN — HYDRALAZINE HYDROCHLORIDE 100 MG: 50 TABLET, FILM COATED ORAL at 16:36

## 2024-01-17 RX ADMIN — CARVEDILOL 25 MG: 25 TABLET, FILM COATED ORAL at 20:56

## 2024-01-17 RX ADMIN — GABAPENTIN 300 MG: 300 CAPSULE ORAL at 20:56

## 2024-01-17 RX ADMIN — BENZOCAINE AND MENTHOL 1 LOZENGE: 15; 3.6 LOZENGE ORAL at 00:54

## 2024-01-17 ASSESSMENT — COGNITIVE AND FUNCTIONAL STATUS - GENERAL
DAILY ACTIVITIY SCORE: 24
MOBILITY SCORE: 20
MOBILITY SCORE: 22
STANDING UP FROM CHAIR USING ARMS: A LITTLE
WALKING IN HOSPITAL ROOM: A LITTLE
WALKING IN HOSPITAL ROOM: A LITTLE
CLIMB 3 TO 5 STEPS WITH RAILING: A LITTLE
MOVING TO AND FROM BED TO CHAIR: A LITTLE
CLIMB 3 TO 5 STEPS WITH RAILING: A LITTLE

## 2024-01-17 ASSESSMENT — PAIN SCALES - GENERAL
PAINLEVEL_OUTOF10: 4
PAINLEVEL_OUTOF10: 0 - NO PAIN
PAINLEVEL_OUTOF10: 0 - NO PAIN

## 2024-01-17 ASSESSMENT — ACTIVITIES OF DAILY LIVING (ADL)
LACK_OF_TRANSPORTATION: NO
ADL_ASSISTANCE: INDEPENDENT

## 2024-01-17 NOTE — CONSULTS
"Nutrition Initial Assessment:   Nutrition Assessment    Reason for Assessment: Admission nursing screening, Provider consult order    Patient is a 53 y.o. female with PMH of ESRD on HD who presented to the ED with shortness of breath and concerns about her BP being elevated despite taking her home PO meds. Admission for Acute pulmonary edema.       Nutrition History:  Energy Intake: Poor < 50 %  Food and Nutrient History: Patient reports she has had a decreased appetite for the past several months. Reports food doesn't taste good anymore. Reports she usually consumes 1 meal per day. Endorses some diarrhea. Denies nausea, vomiting, abdominal pain. Denies difficulty chewing or swallowing. States she has not tried Ensure/Nepro/Magic Cup supplements before but is agreeable to trying.  Food Allergies/Intolerances:   shellfish containing products  GI Symptoms: Diarrhea  Oral Problems: None       Anthropometrics:  Height: 139.7 cm (4' 7\")   Weight: 61.3 kg (135 lb 2.3 oz)   BMI (Calculated): 31.41  IBW/kg (Dietitian Calculated): 34.1 kg  Percent of IBW: 180 %       Weight History:   01/16/24 61.3 kg (135 lb 2.3 oz)   01/09/24 57.7 kg (127 lb 3.3 oz)   10/26/23 57.7 kg (127 lb 1.6 oz)   06/30/23 60.6 kg (133 lb 11.2 oz)   06/09/23 61.8 kg (136 lb 3.2 oz)   04/05/23 62.1 kg (137 lb)   03/09/23 64 kg (141 lb 3.2 oz)   03/06/23 63.5 kg (140 lb)   02/14/23 64.1 kg (141 lb 5 oz)   12/27/22 66.7 kg (147 lb)       Weight Change %:  Weight History / % Weight Change: Patient reports usual body weight of 135 lb. Pt states she was 123 lb last month - was surprised that she has gained weight. Suspect weight gain may be fluid related considering pt reports poor appetite. Has 6.2% significant wt gain in 1 week per wt hx.    Nutrition Focused Physical Exam Findings:    Subcutaneous Fat Loss:   Orbital Fat Pads: Mild-Moderate (slight dark circles and slight hollowing)  Buccal Fat Pads: Mild-Moderate (flat cheeks, minimal bounce)  Triceps: " Mild-moderate (less than ample fat tissue)  Muscle Wasting:  Temporalis: Mild-Moderate (slight depression)  Pectoralis (Clavicular Region): Mild-Moderate (some protrusion of clavicle)  Deltoid/Trapezius: Mild-Moderate (slight protrusion of acromion process)  Quadriceps: Severe (depressions on inner and outer thigh)  Gastrocnemius: Severe (minimal muscle definition)  Edema:  Edema: +1 trace  Edema Location: generalized  Physical Findings:  Hair: Negative  Eyes: Negative  Skin: Negative (intact)    Nutrition Significant Labs:  CBC Trend:   Results from last 7 days   Lab Units 01/16/24  1612 01/16/24  0350 01/16/24  0256 01/15/24  2149   WBC AUTO x10*3/uL 6.5 5.7 6.5 8.2   RBC AUTO x10*6/uL 2.87* 2.06* 1.83* 2.40*   HEMOGLOBIN g/dL 8.4* 6.0* 5.6* 7.0*   HEMATOCRIT % 25.6* 18.9* 17.7* 21.7*   MCV fL 89 92 97 90   PLATELETS AUTO x10*3/uL 217 195 208 222    , BMP Trend:   Results from last 7 days   Lab Units 01/16/24  0256 01/15/24  2149   GLUCOSE mg/dL 86 83   CALCIUM mg/dL 8.7 9.3   SODIUM mmol/L 143 143   POTASSIUM mmol/L 5.1 4.6   CO2 mmol/L 29 31   CHLORIDE mmol/L 102 101   BUN mg/dL 34* 36*   CREATININE mg/dL 9.09* 9.28*    , Renal Lab Trend:   Results from last 7 days   Lab Units 01/16/24  0256 01/15/24  2149   POTASSIUM mmol/L 5.1 4.6   PHOSPHORUS mg/dL 5.2* 4.9   SODIUM mmol/L 143 143   MAGNESIUM mg/dL  --  2.34   EGFR mL/min/1.73m*2 5* 5*   BUN mg/dL 34* 36*   CREATININE mg/dL 9.09* 9.28*        Nutrition Specific Medications:  Scheduled medications  albuterol, 2 puff, inhalation, BID  amLODIPine, 10 mg, oral, Daily  atorvastatin, 80 mg, oral, Nightly  carvedilol, 25 mg, oral, BID  cloNIDine, 0.1 mg, oral, BID  fluticasone furoate-vilanteroL, 1 puff, inhalation, Daily  gabapentin, 300 mg, oral, Nightly  heparin, 5,000 Units, subcutaneous, q8h  hydrALAZINE, 100 mg, oral, q8h  [Held by provider] isosorbide mononitrate ER, 30 mg, oral, Daily  pantoprazole, 40 mg, oral, Daily  [Held by provider] valsartan, 320 mg,  oral, Daily      Continuous medications     PRN medications  PRN medications: [MAR Hold] acetaminophen, [MAR Hold] benzocaine-menthol, oxygen     I/O:   Last BM Date: 01/15/24; Stool Appearance: Loose, Soft (01/16/24 1100)        Dietary Orders (From admission, onward)       Start     Ordered    01/16/24 0743  Adult diet Renal; Potassium Restricted 2 gm (50mEq); 2 - 3 grams Sodium  Diet effective now        Question Answer Comment   Diet type Renal    Potassium restriction: Potassium Restricted 2 gm (50mEq)    Sodium restriction: 2 - 3 grams Sodium        01/16/24 0742                     Estimated Needs:   Total Energy Estimated Needs (kCal): 1300 kCal  Method for Estimating Needs: HXG=3791 using 61.3kg (SF 1.2)  Total Protein Estimated Needs (g):  (50+)  Method for Estimating Needs: 1.5+ g/kg (IBW 34.1kg)  Total Fluid Estimated Needs (mL):  (1000mL + UOP)           Nutrition Diagnosis   Malnutrition Diagnosis  Patient has Malnutrition Diagnosis: Yes  Diagnosis Status: New  Malnutrition Diagnosis: Severe malnutrition related to chronic disease or condition  As Evidenced by: patient meeting </=75% of estimated energy requirements for >/=1 month; moderate-severe muscle and subcutaneous fat loss.            Nutrition Interventions/Recommendations         Nutrition Prescription:  Individualized Nutrition Prescription Provided for : Nepro BID (420kcal, 19g protein) each, Magic Cup once daily (290kcal, 9g protein). Consider liberalizing diet to regular to promote PO intake. Encouraged PO intake of oral nutrition supplements. Recommend renal multivitamin.        Nutrition Interventions:   Food and/or Nutrient Delivery Interventions  Interventions: Meals and snacks, Medical food supplement  Goal: consume >50% of meals, consume oral nutrition supplements, BG 80-180mg/dl, electrolytes WNL.         Nutrition Education:   Encouraged PO intake of oral nutrition supplements. Educated on Nepro oral nutrition supplements for  dialysis patients.       Nutrition Monitoring and Evaluation   Food/Nutrient Related History Monitoring  Monitoring and Evaluation Plan: Energy intake, Fluid intake, Amount of food  Amount of Food: Estimated amout of food, Medical food intake    Body Composition/Growth/Weight History  Monitoring and Evaluation Plan: Weight    Biochemical Data, Medical Tests and Procedures  Monitoring and Evaluation Plan: Electrolyte/renal panel, Glucose/endocrine profile  Electrolyte and Renal Panel: Phosphorus, Potassium    Nutrition Focused Physical Findings  Monitoring and Evaluation Plan: Digestive System  Digestive System: Decrease in appetite, Diarrhea       Time Spent/Follow-up Reminder:   Time Spent (min): 60 minutes  Last Date of Nutrition Visit: 01/17/24  Nutrition Follow-Up Needed?: Dietitian to reassess per policy

## 2024-01-17 NOTE — PROGRESS NOTES
01/17/24 1608   Discharge Planning   Living Arrangements Spouse/significant other   Support Systems Spouse/significant other   Assistance Needed resumed [facility] for PT and OT.  Pt would like assistance with arranging transport to dialysis.   Type of Residence Private residence   Who is requesting discharge planning? Patient   Home or Post Acute Services In home services   Type of Home Care Services Home OT;Home PT   Patient expects to be discharged to: Resumed [facility] for PT and OT.   Does the patient need discharge transport arranged? No  (Pt will call for a ride home.)   Financial Resource Strain   How hard is it for you to pay for the very basics like food, housing, medical care, and heating? Not very   Housing Stability   In the last 12 months, was there a time when you were not able to pay the mortgage or rent on time? N   In the last 12 months, was there a time when you did not have a steady place to sleep or slept in a shelter (including now)? N   Transportation Needs   In the past 12 months, has lack of transportation kept you from medical appointments or from getting medications? no   In the past 12 months, has lack of transportation kept you from meetings, work, or from getting things needed for daily living? No     Assessment Note:  Met with pt and introduced myself as care coordinator and member of the Care Transitions team for discharge planning.   Pt feels safe at home and was independent prior to admission.  Pt uses [facility] for drs appts.  Pt would like assistance with transportation to dialysis appts, plan to notify SW.  Pt's address, phone number and contact information was verified.  Pt will need home care orders for PT and OT when medically ready ([facility]). Pt does not have any other questions/concerns at this time.     Previous Home Care: Pt was recently discharged home with [facility] for PT and OT (visit was scheduled for 1/17).  DME: standard walker.  Pt feels she needs oxygen at home d/t MATHEW  (currently wearing oxygen).  Pharmacy: [facility]: Denies  PCP:  Carroll Medina ( Oct 2023)  Dialysis: [facility] on TTS at 6am.  Nephrologist is Dr. Brasher ([facility]).    Otilia Baker MSN, RN-BC  Transitional Care Coordinator (TCC)  [phone]

## 2024-01-17 NOTE — PROGRESS NOTES
Waiting for PT recommendations to begin discharge planning. Pt was not medically able to participate in PT previously. This SW will continue to monitor for discharge.    [facility] for [facility] to dialysis appointments.    - Roxana Bermudez MSW, LSW

## 2024-01-17 NOTE — CARE PLAN
The patient's goals for the shift include      The clinical goals for the shift include get patient dialysis      Pt. Did receive dialysis.

## 2024-01-17 NOTE — CARE PLAN
The patient's goals for the shift include      The clinical goals for the shift include Patient to remain free from fall/injury this shift    Over the shift, the patient is making adequate progress towards stated care plan goals

## 2024-01-17 NOTE — PROGRESS NOTES
Physical Therapy    Physical Therapy Evaluation    Patient Name: Stacey Heath  MRN: [id number]  Today's Date: 1/17/2024   Time Calculation  Start Time: 1029  Stop Time: 1102  Time Calculation (min): 33 min    Assessment/Plan   PT Assessment  PT Assessment Results: Decreased strength, Decreased endurance, Impaired balance, Decreased mobility  Rehab Prognosis: Excellent  Medical Staff Made Aware: Yes  End of Session Communication: Bedside nurse  Assessment Comment: 53 year old female admitted with hypertensive emergency, flash pulmonary edema. Pt presents with decreased strength, endurance and balance impacting functional mobility. Pt would benefit from continued PT while in hospital to improve functional mobility and return to PLOF.  End of Session Patient Position: Bed, 3 rail up, Alarm off, not on at start of session (sititng EOB)  IP OR SWING BED PT PLAN  Inpatient or Swing Bed: Inpatient  PT Plan  Treatment/Interventions: Bed mobility, Transfer training, Gait training, Stair training, Balance training, Neuromuscular re-education, Strengthening, Endurance training, Therapeutic exercise, Home exercise program, Therapeutic activity, Positioning, Postural re-education  PT Plan: Skilled PT  PT Frequency: 2 times per week  PT Discharge Recommendations: Low intensity level of continued care  Equipment Recommended upon Discharge:  (rollator)  PT Recommended Transfer Status: Stand by assist  PT - OK to Discharge: Yes      Subjective   General Visit Information:  Reason for Referral: 53 year old female admitted with hypertensive emergency, flash pulmonary edema.  Past Medical History Relevant to Rehab: ESRD on HD Tu Th Sa (presumably secondary to T2DM and HTN), HFpEF  Prior to Session Communication: Bedside nurse  Patient Position Received: Bed, 3 rail up, Alarm off, not on at start of session  General Comment: Pt supine in bed upon entry to room. Pt pleasant, cooperative and willing to work with PT. noted 2L of o2 and  continous pulse ox.   Home Living:  Home Living  Type of Home: House  Lives With:  (juanpablo and 14 year old nephew)  Home Adaptive Equipment: Walker rolling or standard  Home Layout: Bed/bath upstairs, Stairs to alternate level with rails  Alternate Level Stairs-Number of Steps: 12  Home Access: Stairs to enter with rails  Entrance Stairs-Number of Steps: 5  Prior Level of Function:  Prior Function Per Pt/Caregiver Report  Level of [place]: Independent with ADLs and functional transfers, Independent with homemaking with ambulation  ADL Assistance: Independent  Homemaking Assistance: Independent  Ambulatory Assistance: Independent  Prior Function Comments: pt denies any falls over the past 6 months. Pt stating her mobility has been limited 2/2 to SOB with exertion.  Precautions:  Precautions  Medical Precautions: Oxygen therapy device and L/min  Vital Signs:  Vital Signs  SpO2: (!) 86 % (86%-94% when ambulating without o2)    Objective     Pain:  Pain Assessment  Pain Score: 0 - No pain  Cognition:  Cognition  Overall Cognitive Status: Within Functional Limits  Orientation Level: Oriented X4    Extremity/Trunk Assessments:  Strength:    RLE   RLE :  (grossly at least 4+/5 based on functional observation)  LLE   LLE :  (grossly at least 4+/5 based on functional observation)    General Assessments:    Activity Tolerance  Endurance: Tolerates 10 - 20 min exercise with multiple rests  Sensation  Light Touch: No apparent deficits     Dynamic Sitting Balance  Dynamic Sitting-Comments: sba  Static Standing Balance  Static Standing-Comment/Number of Minutes: sba  Dynamic Standing Balance  Dynamic Standing-Comments: cga    Functional Assessments:  Bed Mobility  Bed Mobility: Yes  Bed Mobility 1  Bed Mobility 1: Supine to sitting  Level of Assistance 1: Distant supervision  Transfers  Transfer: Yes  Transfer 1  Technique 1: Sit to stand, Stand to sit  Transfer Device 1: Walker  Transfer Level of Assistance 1: Close  supervision  Ambulation/Gait Training  Ambulation/Gait Training Performed: Yes  Ambulation/Gait Training 1  Surface 1: Level tile  Device 1: Rolling walker  Assistance 1: Contact guard  Quality of Gait 1:  (decreaed brendon and step length; pt taking multiple standing rest breaks)  Comments/Distance (ft) 1: 200ft    Outcome Measures:  [facility] Basic Mobility  Turning from your back to your side while in a flat bed without using bedrails: None  Moving from lying on your back to sitting on the side of a flat bed without using bedrails: None  Moving to and from bed to chair (including a wheelchair): None  Standing up from a chair using your arms (e.g. wheelchair or bedside chair): None  To walk in hospital room: A little  Climbing 3-5 steps with railing: A little  Basic Mobility - Total Score: 22      Encounter Problems       Encounter Problems (Active)       Mobility       Pt will demonstrate WFL BLE strength to complete therapeutic exercise and participate in functional mobility.         Start:  01/17/24    Expected End:  01/31/24            Pt will ambulate >250ft with LRAD and sba Assist with no LOB and VSS.         Start:  01/17/24    Expected End:  01/31/24            Pt will complete the 6mwt and score within age related normative values.        Start:  01/17/24    Expected End:  01/31/24               Pain - Adult          Transfers       Pt will perform all functional transfers with LRAD and [place] assist.         Start:  01/17/24    Expected End:  01/31/24                   Education Documentation  Mobility Training, taught by Aide Correa PT at 1/17/2024  2:47 PM.  Learner: Patient  Readiness: Acceptance  Method: Explanation  Response: Verbalizes Understanding    Education Comments  No comments found.      01/17/24 at 2:48 PM   Aide Correa PT   Rehab Office: [phone]

## 2024-01-17 NOTE — PROGRESS NOTES
Assessment/Plan   Ms. Heath is a 53y F PMH signficant for ESRD on HD Tu Th Sa (presumably secondary to T2DM and HTN), HFpEF who presented to ED with worsening dyspnea and chest pain in setting of nausea, vomiting, and diarrhea.  She is also having URI sx. n ED, markedly hypertensive 220/96, was found to have diffuse B lines, started on nitroglycerin gtt for afterload reduction.  On arrival to CICU, HTN had improved substantially.  Troponins negative x2, EKG without ischemic changes (evidence of LVH and repolarization abnormality).  No active CP, nausea or other symptoms, patient was resumed on home medications with improved blood pressure.  She was anemic and transfused 2 units of blood.  Now stable and transferred to the medical floor..      #Acute flash pulm edema - resolved  #Acute hypoxemic respiratory failure  #HFpEF  BP 220s/100 on presentation, B lines on ultrasound. S/p NIPPV and afterload reduction with improvement.   -Patient cannot take medications in the setting of nausea leading to markedly elevated blood pressure and flash pulmonary edema.   -Currently borderline oxygen requirement, continuing blood pressure management and fluid removal per nephrology.  Suspect markedly elevated blood pressure is (1) driven by nausea (2) did not keep antihypertensives down due to 4 episodes of emesis during the day.  -  echo ordered to re-assess diastology.     #Resistant HTN  -Patient has labile, difficult to treat HTN and is on numerous antihypertensives.  Recurrent presentations for hypertensive crisis.  CTA 2 months ago did not show adrenal mass or stenosis, TSH was normal, patient has ESRD which could be contributing.  Recommending outpatient sleep study.  -Continue  Regarding secondary HTN workup - she has CTA abdomen 2 months ago which showed no adrenal mass, no renal artery stenosis.  Checked TSH which was normal.  Of course, ESRD may result in resistnent HTN.  Patient does snore and BMI 31, which could suggest  "JAMSHID; never had sleep study.  - resume home clonidine 0.1mg bid, valsartan 320, Imdur 30, amlodipine 10mg     #Chest pain  troponins are negative, and EKG without any ischemic changes (LVH and some repolarization abnormality).       #DLD  - Cont atorva 80.      Scheduled outpatient appointments in system:   No future appointments.    ---------------------------------------------------------------------------------------------------  Subjective   No new events.  Patient still feeling slightly short of breath.  She was on room air but required some oxygen overnight and is now breathing on room air again.  She is reporting nasal congestion and sore throat.  No nausea vomiting or diarrhea, tolerating diet.     ---------------------------------------------------------------------------------------------------  Objective   Last Recorded Vitals  Blood pressure 169/72, pulse 79, temperature 36.5 °C (97.7 °F), temperature source Temporal, resp. rate 16, height 1.397 m (4' 7\"), weight 61.3 kg (135 lb 2.3 oz), SpO2 100 %.  Intake/Output last 3 Shifts:  I/O last 3 completed shifts:  In: 3716.5 (60.6 mL/kg) [I.V.:1418 (23.1 mL/kg); Blood:2098.5; Other:200]  Out: 734 (12 mL/kg) [Other:734]  Weight: 61.3 kg     Physical Exam  Constitutional:       General: She is not in acute distress.     Appearance: Normal appearance. She is not ill-appearing.   HENT:      Head: Normocephalic and atraumatic.      Mouth/Throat:      Mouth: Mucous membranes are moist.   Eyes:      Extraocular Movements: Extraocular movements intact.      Pupils: Pupils are equal, round, and reactive to light.   Neck:      Comments: No JVD.  Cardiovascular:      Rate and Rhythm: Normal rate and regular rhythm.      Comments: Systolic murmur present.    Pulmonary:      Effort: Pulmonary effort is normal.      Breath sounds: Normal breath sounds.      Comments: Diminished breath sounds  Abdominal:      General: Abdomen is flat.      Palpations: Abdomen is soft. "   Musculoskeletal:         General: No swelling.      Cervical back: Neck supple.   Skin:     General: Skin is warm and dry.   Neurological:      General: No focal deficit present.      Mental Status: She is alert and oriented to person, place, and time.   Psychiatric:         Mood and Affect: Mood normal.         Relevant Results  Lab Results   Component Value Date    WBC 6.5 01/16/2024    HGB 8.4 (L) 01/16/2024    HCT 25.6 (L) 01/16/2024    MCV 89 01/16/2024     01/16/2024      Lab Results   Component Value Date    GLUCOSE 86 01/16/2024    CALCIUM 8.7 01/16/2024     01/16/2024    K 5.1 01/16/2024    CO2 29 01/16/2024     01/16/2024    BUN 34 (H) 01/16/2024    CREATININE 9.09 (H) 01/16/2024     Scheduled medications  albuterol, 2 puff, inhalation, BID  amLODIPine, 10 mg, oral, Daily  atorvastatin, 80 mg, oral, Nightly  carvedilol, 25 mg, oral, BID  cloNIDine, 0.1 mg, oral, BID  fluticasone furoate-vilanteroL, 1 puff, inhalation, Daily  gabapentin, 300 mg, oral, Nightly  heparin, 5,000 Units, subcutaneous, q8h  hydrALAZINE, 100 mg, oral, q8h  [Held by provider] isosorbide mononitrate ER, 30 mg, oral, Daily  pantoprazole, 40 mg, oral, Daily  [Held by provider] valsartan, 320 mg, oral, Daily      Continuous medications     PRN medications  PRN medications: [MAR Hold] acetaminophen, [MAR Hold] benzocaine-menthol, oxygen    Al Painter MD

## 2024-01-18 ENCOUNTER — APPOINTMENT (OUTPATIENT)
Dept: DIALYSIS | Facility: HOSPITAL | Age: 54
End: 2024-01-18
Payer: COMMERCIAL

## 2024-01-18 ENCOUNTER — DOCUMENTATION (OUTPATIENT)
Dept: HOME HEALTH SERVICES | Facility: HOME HEALTH | Age: 54
End: 2024-01-18
Payer: COMMERCIAL

## 2024-01-18 ENCOUNTER — HOME HEALTH ADMISSION (OUTPATIENT)
Dept: HOME HEALTH SERVICES | Facility: HOME HEALTH | Age: 54
End: 2024-01-18
Payer: COMMERCIAL

## 2024-01-18 LAB
ALBUMIN SERPL BCP-MCNC: 3.2 G/DL (ref 3.4–5)
ANION GAP SERPL CALC-SCNC: 19 MMOL/L (ref 10–20)
BUN SERPL-MCNC: 50 MG/DL (ref 6–23)
CALCIUM SERPL-MCNC: 8.5 MG/DL (ref 8.6–10.6)
CHLORIDE SERPL-SCNC: 102 MMOL/L (ref 98–107)
CO2 SERPL-SCNC: 24 MMOL/L (ref 21–32)
CREAT SERPL-MCNC: 8.38 MG/DL (ref 0.5–1.05)
EGFRCR SERPLBLD CKD-EPI 2021: 5 ML/MIN/1.73M*2
ERYTHROCYTE [DISTWIDTH] IN BLOOD BY AUTOMATED COUNT: 15.6 % (ref 11.5–14.5)
GLUCOSE SERPL-MCNC: 208 MG/DL (ref 74–99)
HCT VFR BLD AUTO: 25.8 % (ref 36–46)
HGB BLD-MCNC: 8.4 G/DL (ref 12–16)
MCH RBC QN AUTO: 30.7 PG (ref 26–34)
MCHC RBC AUTO-ENTMCNC: 32.6 G/DL (ref 32–36)
MCV RBC AUTO: 94 FL (ref 80–100)
NRBC BLD-RTO: 0 /100 WBCS (ref 0–0)
PHOSPHATE SERPL-MCNC: 4.8 MG/DL (ref 2.5–4.9)
PLATELET # BLD AUTO: 232 X10*3/UL (ref 150–450)
POTASSIUM SERPL-SCNC: 5.2 MMOL/L (ref 3.5–5.3)
RBC # BLD AUTO: 2.74 X10*6/UL (ref 4–5.2)
SODIUM SERPL-SCNC: 140 MMOL/L (ref 136–145)
WBC # BLD AUTO: 7.2 X10*3/UL (ref 4.4–11.3)

## 2024-01-18 PROCEDURE — 99233 SBSQ HOSP IP/OBS HIGH 50: CPT | Performed by: STUDENT IN AN ORGANIZED HEALTH CARE EDUCATION/TRAINING PROGRAM

## 2024-01-18 PROCEDURE — 36415 COLL VENOUS BLD VENIPUNCTURE: CPT | Performed by: STUDENT IN AN ORGANIZED HEALTH CARE EDUCATION/TRAINING PROGRAM

## 2024-01-18 PROCEDURE — 85027 COMPLETE CBC AUTOMATED: CPT | Performed by: STUDENT IN AN ORGANIZED HEALTH CARE EDUCATION/TRAINING PROGRAM

## 2024-01-18 PROCEDURE — 8010000001 HC DIALYSIS - HEMODIALYSIS PER DAY

## 2024-01-18 PROCEDURE — 2500000004 HC RX 250 GENERAL PHARMACY W/ HCPCS (ALT 636 FOR OP/ED): Performed by: STUDENT IN AN ORGANIZED HEALTH CARE EDUCATION/TRAINING PROGRAM

## 2024-01-18 PROCEDURE — 2500000001 HC RX 250 WO HCPCS SELF ADMINISTERED DRUGS (ALT 637 FOR MEDICARE OP): Performed by: STUDENT IN AN ORGANIZED HEALTH CARE EDUCATION/TRAINING PROGRAM

## 2024-01-18 PROCEDURE — 90935 HEMODIALYSIS ONE EVALUATION: CPT | Performed by: INTERNAL MEDICINE

## 2024-01-18 PROCEDURE — 80069 RENAL FUNCTION PANEL: CPT | Performed by: STUDENT IN AN ORGANIZED HEALTH CARE EDUCATION/TRAINING PROGRAM

## 2024-01-18 PROCEDURE — 1210000001 HC SEMI-PRIVATE ROOM DAILY

## 2024-01-18 RX ORDER — ACETAMINOPHEN 325 MG/1
650 TABLET ORAL EVERY 6 HOURS PRN
Status: DISCONTINUED | OUTPATIENT
Start: 2024-01-18 | End: 2024-01-20 | Stop reason: HOSPADM

## 2024-01-18 RX ORDER — DIPHENHYDRAMINE HYDROCHLORIDE 50 MG/ML
25 INJECTION INTRAMUSCULAR; INTRAVENOUS ONCE
Status: COMPLETED | OUTPATIENT
Start: 2024-01-18 | End: 2024-01-18

## 2024-01-18 RX ADMIN — CARVEDILOL 25 MG: 25 TABLET, FILM COATED ORAL at 21:31

## 2024-01-18 RX ADMIN — BENZOCAINE AND MENTHOL 1 LOZENGE: 15; 3.6 LOZENGE ORAL at 21:30

## 2024-01-18 RX ADMIN — HYDRALAZINE HYDROCHLORIDE 100 MG: 50 TABLET, FILM COATED ORAL at 01:22

## 2024-01-18 RX ADMIN — GABAPENTIN 300 MG: 300 CAPSULE ORAL at 21:31

## 2024-01-18 RX ADMIN — HEPARIN SODIUM 5000 UNITS: 5000 INJECTION INTRAVENOUS; SUBCUTANEOUS at 01:22

## 2024-01-18 RX ADMIN — BENZOCAINE AND MENTHOL 1 LOZENGE: 15; 3.6 LOZENGE ORAL at 15:29

## 2024-01-18 RX ADMIN — ATORVASTATIN CALCIUM 80 MG: 80 TABLET, FILM COATED ORAL at 21:31

## 2024-01-18 RX ADMIN — CARVEDILOL 25 MG: 25 TABLET, FILM COATED ORAL at 13:06

## 2024-01-18 RX ADMIN — HEPARIN SODIUM 5000 UNITS: 5000 INJECTION INTRAVENOUS; SUBCUTANEOUS at 13:06

## 2024-01-18 RX ADMIN — AMLODIPINE BESYLATE 10 MG: 10 TABLET ORAL at 13:05

## 2024-01-18 RX ADMIN — CLONIDINE HYDROCHLORIDE 0.1 MG: 0.1 TABLET ORAL at 21:31

## 2024-01-18 RX ADMIN — ACETAMINOPHEN 975 MG: 325 TABLET ORAL at 13:09

## 2024-01-18 RX ADMIN — DIPHENHYDRAMINE HYDROCHLORIDE 25 MG: 50 INJECTION INTRAMUSCULAR; INTRAVENOUS at 01:21

## 2024-01-18 RX ADMIN — HYDRALAZINE HYDROCHLORIDE 100 MG: 50 TABLET, FILM COATED ORAL at 13:16

## 2024-01-18 RX ADMIN — ALBUTEROL SULFATE 2 PUFF: 90 AEROSOL, METERED RESPIRATORY (INHALATION) at 21:34

## 2024-01-18 RX ADMIN — HEPARIN SODIUM 5000 UNITS: 5000 INJECTION INTRAVENOUS; SUBCUTANEOUS at 16:23

## 2024-01-18 RX ADMIN — HYDRALAZINE HYDROCHLORIDE 100 MG: 50 TABLET, FILM COATED ORAL at 16:23

## 2024-01-18 RX ADMIN — CLONIDINE HYDROCHLORIDE 0.1 MG: 0.1 TABLET ORAL at 13:06

## 2024-01-18 ASSESSMENT — PAIN SCALES - GENERAL
PAINLEVEL_OUTOF10: 0 - NO PAIN
PAINLEVEL_OUTOF10: 0 - NO PAIN

## 2024-01-18 ASSESSMENT — PAIN - FUNCTIONAL ASSESSMENT
PAIN_FUNCTIONAL_ASSESSMENT: NO/DENIES PAIN
PAIN_FUNCTIONAL_ASSESSMENT: NO/DENIES PAIN

## 2024-01-18 NOTE — PROGRESS NOTES
"Stacey Heath  Age: 53 y.o.  MRN: [id number]  Date: 1/17/2024  Location of service: in community    Program Details  Medicaid Community Clinical Case Management  Status: Enrolled  Effective Dates: 10/17/2023 - present  Responsible Staff: PORFIRIO Valderrama      Goals Reviewed:  Problem: Anxiety       Goal: Attempts to manage anxiety with help       Priority: High         Goal: Verbalizes ways to manage anxiety       Priority: High         Goal: Implements measures to reduce anxiety       Priority: High        Problem: Kidney Issues       Goal: Improve health to get on kidney transplant list       Priority: High        Problem: Risk of Uncoordinated Care       Goal: Care will be Coordinated and Supported by a Multidisciplinary Team of Providers       Priority: High          Summary:  Patient states she went to dialysis on Friday, felt fine, and everything went well, but states that by Monday she couldn't breathe and couldn't even walk herself into the ED when her daughter brought her. She had to be wheeled in via wheelchair.  Patient states she was immediately taken to an ED bed and was placed on, \"a big machine, I think it was a bipap\" and was very quickly transferred to ICU.   Patient states they she had dialysis in the hospital on Monday and they took off 3 liters.  Patient states they are very concerned about her O2 and states she may need to be on oxygen at home.  She states they are also trying to figure out how to prevent her from cramping after dialysis.   Patient states she has remained compliant with her medications at home and feels there is a secondary issue happening and has been telling the doctors that and she hopes they will take her seriously and assess her.  Patient states she had to have 2 blood transfusions yesterday. Because of this she states they are trying to find out if she is bleeding anywhere.    Patient states the doctor told her that when they did an endoscopy they saw multiple small " "tears in her stomach and that they also took a piece of her esophagus to biopsy.   Patient states they are possibly going to send her home with home care.  Patient's food arrived while this writer was present and this writer witnessed patient eating at least 50% of her meal and she was still eating when this writer left.  Patient states she can't wait to get home to eat \"real food\"  Patient states she hasn't been able to research the calming things that Dora MONROY recommended. However she states she has been able to remain calm with a positive outlook and she feels this has helped tremendously with lowering her BP.  Patient discussed at length her relationship with her great aunt when she was a child and in young adulthood. Patient also discusses her relationship with her family members now.  Patient discusses her daughter's baby's death from a few years ago, her daughter has since had a healthy child.     Patient was instructed to set up cardiology, pulmonology, and gastro appointments after she leaves the hospital. This writer will follow-up and assist if needed.    Appointment start time: 1250  Appointment completion time: 1423  Total time spent with patient (in minutes): 93      Roberta Gallego RN    "

## 2024-01-18 NOTE — PROGRESS NOTES
Subjective     Interval History: Stacey Heath    Patient seen on dialysis, no complaints          Current Facility-Administered Medications:     [MAR Hold] acetaminophen (Tylenol) tablet 975 mg, 975 mg, oral, q8h PRN, Rainer Castaneda MD, 975 mg at 01/17/24 1805    albuterol 90 mcg/actuation inhaler 2 puff, 2 puff, inhalation, BID, Al Painter MD, 2 puff at 01/17/24 2057    amLODIPine (Norvasc) tablet 10 mg, 10 mg, oral, Daily, Al Painter MD, 10 mg at 01/17/24 0942    atorvastatin (Lipitor) tablet 80 mg, 80 mg, oral, Nightly, Al Painter MD, 80 mg at 01/17/24 2056    [MAR Hold] benzocaine-menthol (Cepastat Sore Throat) 15-3.6 mg lozenge 1 lozenge, 1 lozenge, Mouth/Throat, q2h PRN, Rainer Castaneda MD, 1 lozenge at 01/17/24 2355    carvedilol (Coreg) tablet 25 mg, 25 mg, oral, BID, Al Painter MD, 25 mg at 01/17/24 2056    cloNIDine (Catapres) tablet 0.1 mg, 0.1 mg, oral, BID, Al Painter MD, 0.1 mg at 01/17/24 2056    eucerin cream, , Topical, PRN, Yusra Chavez DO    fluticasone furoate-vilanteroL (Breo Ellipta) 100-25 mcg/dose inhaler 1 puff, 1 puff, inhalation, Daily, Al Painter MD    gabapentin (Neurontin) capsule 300 mg, 300 mg, oral, Nightly, Al Painter MD, 300 mg at 01/17/24 2056    heparin (porcine) injection 5,000 Units, 5,000 Units, subcutaneous, q8h, Al Painter MD, 5,000 Units at 01/18/24 0122    hydrALAZINE (Apresoline) tablet 100 mg, 100 mg, oral, q8h, Al Painter MD, 100 mg at 01/18/24 0122    [Held by provider] isosorbide mononitrate ER (Imdur) 24 hr tablet 30 mg, 30 mg, oral, Daily, Loretta Vázquez MD    oxygen (O2) therapy, , inhalation, Continuous PRN - O2/gases, Al Painter MD, 3 L/min at 01/15/24 2120    pantoprazole (ProtoNix) EC tablet 40 mg, 40 mg, oral, Daily, Al Painter MD, 40 mg at 01/17/24 0942    [Held by provider] valsartan (Diovan) tablet 320 mg, 320 mg, oral, Daily, Loretta Vázquez,  MD    Physical Exam  Physical Exam  Heart S1 S2 RRR, Lungs CTA, no edema      Vital signs in last 24 hours:  Temp:  [36.4 °C (97.5 °F)-36.6 °C (97.9 °F)] 36.4 °C (97.6 °F)  Heart Rate:  [81-83] 83  Resp:  [17-18] 18  BP: (145-188)/(65-86) 188/86         Labs:  Results from last 7 days   Lab Units 01/18/24  0838   WBC AUTO x10*3/uL 7.2   RBC AUTO x10*6/uL 2.74*   HEMOGLOBIN g/dL 8.4*   HEMATOCRIT % 25.8*     Results from last 7 days   Lab Units 01/17/24  1801 01/16/24  0256 01/15/24  2149   SODIUM mmol/L 139   < > 143   POTASSIUM mmol/L 5.1   < > 4.6   CHLORIDE mmol/L 99   < > 101   CO2 mmol/L 27   < > 31   BUN mg/dL 31*   < > 36*   CREATININE mg/dL 7.25*   < > 9.28*   CALCIUM mg/dL 8.9   < > 9.3   PHOSPHORUS mg/dL 5.1*   < > 4.9   MAGNESIUM mg/dL  --   --  2.34   BILIRUBIN TOTAL mg/dL  --   --  0.5   ALT U/L  --   --  12   AST U/L  --   --  12    < > = values in this interval not displayed.            Assessment/Plan   Patient seen and examined while on dialysis, recent events, labs, medications reviewed. Will follow overall management per primary team, and continue regular dialysis.    Principal Problem:    Acute pulmonary edema (CMS/HCC)        Abdulaziz Carpenter MD  1/18/2024  11:37 AM

## 2024-01-18 NOTE — NURSING NOTE
Report to Receiving RN:    Report To: Dona via Secure Chat  Time Report Called: [id number]  Hand-Off Communication: 1L of fluid was removed and alexis. /82 after tx. Stated pain is a 5/10 in her back.  Complications During Treatment: No  Ultrafiltration Treatment: Yes  Medications Administered During Dialysis: No  Blood Products Administered During Dialysis: No  Labs Sent During Dialysis: Yes  Heparin Drip Rate Changes: N/A      Last Updated: 12:16 PM by ROXY KWOK

## 2024-01-18 NOTE — CARE PLAN

## 2024-01-18 NOTE — PROGRESS NOTES
Assessment/Plan   Ms. Heath is a 53y F PMH signficant for ESRD on HD Tu Th Sa (presumably secondary to T2DM and HTN), HFpEF who presented to ED with worsening dyspnea and chest pain in setting of nausea, vomiting, and diarrhea.  She is also having URI sx. n ED, markedly hypertensive 220/96, was found to have diffuse B lines, started on nitroglycerin gtt for afterload reduction.  On arrival to CICU, HTN had improved substantially.  Troponins negative x2, EKG without ischemic changes (evidence of LVH and repolarization abnormality).  No active CP, nausea or other symptoms, patient was resumed on home medications with improved blood pressure.  She was anemic and transfused 2 units of blood.  Now stable and transferred to the medical floor..      #Acute flash pulm edema - resolved  #Acute hypoxemic respiratory failure  #HFpEF  BP 220s/100 on presentation, B lines on ultrasound. S/p NIPPV and afterload reduction with improvement.   -Patient cannot take medications in the setting of nausea leading to markedly elevated blood pressure and flash pulmonary edema.   -Currently borderline oxygen requirement, continuing blood pressure management and fluid removal per nephrology.  Suspect markedly elevated blood pressure is (1) driven by nausea (2) did not keep antihypertensives down due to 4 episodes of emesis during the day.  - continue to wean o2 as tolerated, volume removal as tolerated per nephro(only 1L removed today), and ordered walking o2 eval.      #Resistant HTN  -Patient has labile, difficult to treat HTN and is on numerous antihypertensives.  Recurrent presentations for hypertensive crisis.  CTA 2 months ago did not show adrenal mass or stenosis, TSH was normal, patient has ESRD which could be contributing.  Recommending outpatient sleep study.  -Continue  Regarding secondary HTN workup - she has CTA abdomen 2 months ago which showed no adrenal mass, no renal artery stenosis.  Checked TSH which was normal.  Of  "course, ESRD may result in resistnent HTN.  Patient does snore and BMI 31, which could suggest JAMSHID; never had sleep study.  - resume home clonidine 0.1mg bid, valsartan 320, Imdur 30, amlodipine 10mg     #Chest pain  troponins are negative, and EKG without any ischemic changes (LVH and some repolarization abnormality).       #DLD  - Cont atorva 80.    Pending weaning o2 and setting up home care.     Scheduled outpatient appointments in system:   No future appointments.    ---------------------------------------------------------------------------------------------------  Subjective   No new events.  Feels better overall. Concerned about continuing to need o2, also very dyspneic still with exertion. Discussion limitations given trouble removing much fluid with HD and difficultiwth blood pressure control with hypervolemia which worsens the cycle. Pt endorsed understanding. All questions answered. Pt would like to see if any support for bp monitoring at home.     ---------------------------------------------------------------------------------------------------  Objective   Last Recorded Vitals  Blood pressure (!) 146/91, pulse 85, temperature 36.6 °C (97.9 °F), resp. rate 16, height 1.397 m (4' 7\"), weight 61.3 kg (135 lb 2.3 oz), SpO2 100 %.  Intake/Output last 3 Shifts:  I/O last 3 completed shifts:  In: 3017.7 (49.2 mL/kg) [P.O.:1200; I.V.:5.2 (0.1 mL/kg); Blood:1812.5]  Out: - (0 mL/kg)   Weight: 61.3 kg     Physical Exam  Constitutional:       General: She is not in acute distress.     Appearance: Normal appearance. She is not ill-appearing.   HENT:      Head: Normocephalic and atraumatic.      Mouth/Throat:      Mouth: Mucous membranes are moist.   Eyes:      Extraocular Movements: Extraocular movements intact.      Pupils: Pupils are equal, round, and reactive to light.   Neck:      Comments: No JVD.  Cardiovascular:      Rate and Rhythm: Normal rate and regular rhythm.      Comments: Systolic murmur present.  "   Pulmonary:      Effort: Pulmonary effort is normal.      Breath sounds: Normal breath sounds.      Comments: Diminished breath sounds  Abdominal:      General: Abdomen is flat.      Palpations: Abdomen is soft.   Musculoskeletal:         General: No swelling.      Cervical back: Neck supple.   Skin:     General: Skin is warm and dry.   Neurological:      General: No focal deficit present.      Mental Status: She is alert and oriented to person, place, and time.   Psychiatric:         Mood and Affect: Mood normal.         Relevant Results  Lab Results   Component Value Date    WBC 7.2 01/18/2024    HGB 8.4 (L) 01/18/2024    HCT 25.8 (L) 01/18/2024    MCV 94 01/18/2024     01/18/2024      Lab Results   Component Value Date    GLUCOSE 91 01/17/2024    CALCIUM 8.9 01/17/2024     01/17/2024    K 5.1 01/17/2024    CO2 27 01/17/2024    CL 99 01/17/2024    BUN 31 (H) 01/17/2024    CREATININE 7.25 (H) 01/17/2024     Scheduled medications  albuterol, 2 puff, inhalation, BID  amLODIPine, 10 mg, oral, Daily  atorvastatin, 80 mg, oral, Nightly  carvedilol, 25 mg, oral, BID  cloNIDine, 0.1 mg, oral, BID  fluticasone furoate-vilanteroL, 1 puff, inhalation, Daily  gabapentin, 300 mg, oral, Nightly  heparin, 5,000 Units, subcutaneous, q8h  hydrALAZINE, 100 mg, oral, q8h  [Held by provider] isosorbide mononitrate ER, 30 mg, oral, Daily  pantoprazole, 40 mg, oral, Daily  [Held by provider] valsartan, 320 mg, oral, Daily      Continuous medications     PRN medications  PRN medications: acetaminophen, [MAR Hold] benzocaine-menthol, eucerin, oxygen    Al Painter MD

## 2024-01-18 NOTE — HH CARE COORDINATION
Home Care received a Referral for Nursing, Physical Therapy, Occupational Therapy, and Home Health Aide. We have processed the referral for a Start of Care on 1/24.     If you have any questions or concerns, please feel free to contact us at [phone]. Follow the prompts, enter your five digit zip code, and you will be directed to your care team on [facility] 3.

## 2024-01-18 NOTE — PROGRESS NOTES
1/18/24 9497 Transitional Care Coordinator Notes:    [facility] unable to accept for home care services. Referrals placed with several agencies. Will continue to follow for accepting referrals.      Assessment/Plan   Principal Problem:    Acute pulmonary edema (CMS/HCC)    Discharge Plans: discharge home with home care           Chetna Schaeffer RN

## 2024-01-18 NOTE — NURSING NOTE
Report from Sending RN:    Report From: PONCHO Bassett  Recent Surgery of Procedure: No  Baseline Level of Consciousness (LOC): x4  Oxygen Use: No  Type:   Diabetic: No  Last BP Med Given Day of Dialysis:   Last Pain Med Given:   Lab Tests to be Obtained with Dialysis: Yes, cbc, rfp  Blood Transfusion to be Given During Dialysis: No  Available IV Access: Yes  Medications to be Administered During Dialysis: No  Continuous IV Infusion Running: No  Restraints on Currently or in the Last 24 Hours: No  Hand-Off Communication:   Patient complaint no pain.  Will come in cart.

## 2024-01-19 LAB
ALBUMIN SERPL BCP-MCNC: 3.5 G/DL (ref 3.4–5)
ANION GAP SERPL CALC-SCNC: 16 MMOL/L (ref 10–20)
BUN SERPL-MCNC: 36 MG/DL (ref 6–23)
CALCIUM SERPL-MCNC: 9.3 MG/DL (ref 8.6–10.6)
CHLORIDE SERPL-SCNC: 100 MMOL/L (ref 98–107)
CO2 SERPL-SCNC: 27 MMOL/L (ref 21–32)
CREAT SERPL-MCNC: 6.12 MG/DL (ref 0.5–1.05)
EGFRCR SERPLBLD CKD-EPI 2021: 8 ML/MIN/1.73M*2
ERYTHROCYTE [DISTWIDTH] IN BLOOD BY AUTOMATED COUNT: 15.3 % (ref 11.5–14.5)
GLUCOSE SERPL-MCNC: 125 MG/DL (ref 74–99)
HCT VFR BLD AUTO: 27.9 % (ref 36–46)
HGB BLD-MCNC: 8.9 G/DL (ref 12–16)
MCH RBC QN AUTO: 30.2 PG (ref 26–34)
MCHC RBC AUTO-ENTMCNC: 31.9 G/DL (ref 32–36)
MCV RBC AUTO: 95 FL (ref 80–100)
NRBC BLD-RTO: 0 /100 WBCS (ref 0–0)
PHOSPHATE SERPL-MCNC: 3.9 MG/DL (ref 2.5–4.9)
PLATELET # BLD AUTO: 262 X10*3/UL (ref 150–450)
POTASSIUM SERPL-SCNC: 4.2 MMOL/L (ref 3.5–5.3)
RBC # BLD AUTO: 2.95 X10*6/UL (ref 4–5.2)
RSV RNA RESP QL NAA+PROBE: NOT DETECTED
SODIUM SERPL-SCNC: 139 MMOL/L (ref 136–145)
WBC # BLD AUTO: 7.9 X10*3/UL (ref 4.4–11.3)

## 2024-01-19 PROCEDURE — 1210000001 HC SEMI-PRIVATE ROOM DAILY

## 2024-01-19 PROCEDURE — 2500000001 HC RX 250 WO HCPCS SELF ADMINISTERED DRUGS (ALT 637 FOR MEDICARE OP): Performed by: STUDENT IN AN ORGANIZED HEALTH CARE EDUCATION/TRAINING PROGRAM

## 2024-01-19 PROCEDURE — 36415 COLL VENOUS BLD VENIPUNCTURE: CPT | Performed by: STUDENT IN AN ORGANIZED HEALTH CARE EDUCATION/TRAINING PROGRAM

## 2024-01-19 PROCEDURE — 87634 RSV DNA/RNA AMP PROBE: CPT | Performed by: STUDENT IN AN ORGANIZED HEALTH CARE EDUCATION/TRAINING PROGRAM

## 2024-01-19 PROCEDURE — 84100 ASSAY OF PHOSPHORUS: CPT | Performed by: STUDENT IN AN ORGANIZED HEALTH CARE EDUCATION/TRAINING PROGRAM

## 2024-01-19 PROCEDURE — 99233 SBSQ HOSP IP/OBS HIGH 50: CPT | Performed by: STUDENT IN AN ORGANIZED HEALTH CARE EDUCATION/TRAINING PROGRAM

## 2024-01-19 PROCEDURE — 2500000004 HC RX 250 GENERAL PHARMACY W/ HCPCS (ALT 636 FOR OP/ED): Performed by: STUDENT IN AN ORGANIZED HEALTH CARE EDUCATION/TRAINING PROGRAM

## 2024-01-19 PROCEDURE — 85027 COMPLETE CBC AUTOMATED: CPT | Performed by: STUDENT IN AN ORGANIZED HEALTH CARE EDUCATION/TRAINING PROGRAM

## 2024-01-19 PROCEDURE — 99233 SBSQ HOSP IP/OBS HIGH 50: CPT | Performed by: NURSE PRACTITIONER

## 2024-01-19 PROCEDURE — 97530 THERAPEUTIC ACTIVITIES: CPT | Mod: GP

## 2024-01-19 RX ORDER — FLUTICASONE PROPIONATE 50 MCG
2 SPRAY, SUSPENSION (ML) NASAL DAILY
Status: DISCONTINUED | OUTPATIENT
Start: 2024-01-19 | End: 2024-01-20 | Stop reason: HOSPADM

## 2024-01-19 RX ADMIN — HEPARIN SODIUM 5000 UNITS: 5000 INJECTION INTRAVENOUS; SUBCUTANEOUS at 21:04

## 2024-01-19 RX ADMIN — AMLODIPINE BESYLATE 10 MG: 10 TABLET ORAL at 08:42

## 2024-01-19 RX ADMIN — RENO CAPS 1 CAPSULE: 100; 1.5; 1.7; 20; 10; 1; 150; 5; 6 CAPSULE ORAL at 13:34

## 2024-01-19 RX ADMIN — GABAPENTIN 300 MG: 300 CAPSULE ORAL at 20:54

## 2024-01-19 RX ADMIN — ALBUTEROL SULFATE 2 PUFF: 90 AEROSOL, METERED RESPIRATORY (INHALATION) at 10:01

## 2024-01-19 RX ADMIN — HYDRALAZINE HYDROCHLORIDE 100 MG: 50 TABLET, FILM COATED ORAL at 16:54

## 2024-01-19 RX ADMIN — ACETAMINOPHEN 650 MG: 325 TABLET ORAL at 14:55

## 2024-01-19 RX ADMIN — CLONIDINE HYDROCHLORIDE 0.1 MG: 0.1 TABLET ORAL at 08:42

## 2024-01-19 RX ADMIN — CARVEDILOL 25 MG: 25 TABLET, FILM COATED ORAL at 08:42

## 2024-01-19 RX ADMIN — CLONIDINE HYDROCHLORIDE 0.1 MG: 0.1 TABLET ORAL at 20:53

## 2024-01-19 RX ADMIN — CARVEDILOL 25 MG: 25 TABLET, FILM COATED ORAL at 20:54

## 2024-01-19 RX ADMIN — HYDRALAZINE HYDROCHLORIDE 100 MG: 50 TABLET, FILM COATED ORAL at 01:35

## 2024-01-19 RX ADMIN — PANTOPRAZOLE SODIUM 40 MG: 40 TABLET, DELAYED RELEASE ORAL at 08:42

## 2024-01-19 RX ADMIN — HYDRALAZINE HYDROCHLORIDE 100 MG: 50 TABLET, FILM COATED ORAL at 08:42

## 2024-01-19 RX ADMIN — HEPARIN SODIUM 5000 UNITS: 5000 INJECTION INTRAVENOUS; SUBCUTANEOUS at 10:15

## 2024-01-19 RX ADMIN — HEPARIN SODIUM 5000 UNITS: 5000 INJECTION INTRAVENOUS; SUBCUTANEOUS at 01:35

## 2024-01-19 RX ADMIN — ATORVASTATIN CALCIUM 80 MG: 80 TABLET, FILM COATED ORAL at 20:54

## 2024-01-19 RX ADMIN — BENZOCAINE AND MENTHOL 1 LOZENGE: 15; 3.6 LOZENGE ORAL at 09:00

## 2024-01-19 ASSESSMENT — COGNITIVE AND FUNCTIONAL STATUS - GENERAL
WALKING IN HOSPITAL ROOM: A LITTLE
STANDING UP FROM CHAIR USING ARMS: A LITTLE
MOVING TO AND FROM BED TO CHAIR: A LITTLE
MOBILITY SCORE: 20
CLIMB 3 TO 5 STEPS WITH RAILING: A LITTLE

## 2024-01-19 ASSESSMENT — PAIN SCALES - GENERAL
PAINLEVEL_OUTOF10: 0 - NO PAIN
PAINLEVEL_OUTOF10: 0 - NO PAIN

## 2024-01-19 ASSESSMENT — PAIN - FUNCTIONAL ASSESSMENT: PAIN_FUNCTIONAL_ASSESSMENT: 0-10

## 2024-01-19 NOTE — PROGRESS NOTES
"Stacey Heath is a 53 y.o. female on day 3 of admission presenting with Acute pulmonary edema (CMS/HCC).    Subjective   Pt sitting on side of bed. Denies sob (mostly sob when up and about),   n/v/d, tiredness, constipation, fever, cough, chills, pain, denies chest pains but feels like heart is racing       Objective     Physical Exam  Vitals and nursing note reviewed.   Constitutional:       Appearance: Normal appearance. She is obese.   Cardiovascular:      Rate and Rhythm: Normal rate and regular rhythm.   Pulmonary:      Comments: Mckinley lung sounds with minor crackles  Encourage IS  On room air  Abdominal:      Comments: Slight dist-non tender to palpation   Genitourinary:     Comments: States make urine  Musculoskeletal:      Comments: Ble without edema   Skin:     General: Skin is warm and dry.   Neurological:      Mental Status: She is alert and oriented to person, place, and time.   Psychiatric:         Mood and Affect: Mood normal.         Behavior: Behavior normal.         Last Recorded Vitals  Blood pressure 146/69, pulse 71, temperature 36.5 °C (97.7 °F), resp. rate 14, height 1.397 m (4' 7\"), weight 61.3 kg (135 lb 2.3 oz), SpO2 94 %.  Intake/Output last 3 Shifts:  I/O last 3 completed shifts:  In: 2560 (41.8 mL/kg) [P.O.:1560; I.V.:600 (9.8 mL/kg); Other:400]  Out: 1000 (16.3 mL/kg) [Other:1000]  Weight: 61.3 kg     Relevant Results    Scheduled medications  albuterol, 2 puff, inhalation, BID  amLODIPine, 10 mg, oral, Daily  atorvastatin, 80 mg, oral, Nightly  B complex-vitamin C-folic acid, 1 capsule, oral, Daily  carvedilol, 25 mg, oral, BID  cloNIDine, 0.1 mg, oral, BID  fluticasone furoate-vilanteroL, 1 puff, inhalation, Daily  gabapentin, 300 mg, oral, Nightly  heparin, 5,000 Units, subcutaneous, q8h  hydrALAZINE, 100 mg, oral, q8h  [Held by provider] isosorbide mononitrate ER, 30 mg, oral, Daily  pantoprazole, 40 mg, oral, Daily  [Held by provider] valsartan, 320 mg, oral, Daily      Continuous " medications     PRN medications  PRN medications: acetaminophen, [MAR Hold] benzocaine-menthol, eucerin, oxygen       Results for orders placed or performed during the hospital encounter of 01/15/24 (from the past 24 hour(s))   Renal Function Panel   Result Value Ref Range    Glucose 125 (H) 74 - 99 mg/dL    Sodium 139 136 - 145 mmol/L    Potassium 4.2 3.5 - 5.3 mmol/L    Chloride 100 98 - 107 mmol/L    Bicarbonate 27 21 - 32 mmol/L    Anion Gap 16 10 - 20 mmol/L    Urea Nitrogen 36 (H) 6 - 23 mg/dL    Creatinine 6.12 (H) 0.50 - 1.05 mg/dL    eGFR 8 (L) >60 mL/min/1.73m*2    Calcium 9.3 8.6 - 10.6 mg/dL    Phosphorus 3.9 2.5 - 4.9 mg/dL    Albumin 3.5 3.4 - 5.0 g/dL   CBC   Result Value Ref Range    WBC 7.9 4.4 - 11.3 x10*3/uL    nRBC 0.0 0.0 - 0.0 /100 WBCs    RBC 2.95 (L) 4.00 - 5.20 x10*6/uL    Hemoglobin 8.9 (L) 12.0 - 16.0 g/dL    Hematocrit 27.9 (L) 36.0 - 46.0 %    MCV 95 80 - 100 fL    MCH 30.2 26.0 - 34.0 pg    MCHC 31.9 (L) 32.0 - 36.0 g/dL    RDW 15.3 (H) 11.5 - 14.5 %    Platelets 262 150 - 450 x10*3/uL            Assessment/Plan   Principal Problem:    Acute pulmonary edema (CMS/HCC)    Tolerated hemodialysis yesterday with net fluid loss 1000cc. Attending will assess fluid removal    Bp slightly elevated with tx but over all stable with tx, appears euvolemic on exam and has stable electrolytes .      Outpatient Dialysis schedule:   TTS [facility]/Dr. Garcia     Access: -rt fist with +thrill/bruit- no issues - able to achieve      Anemia of ESRD:   1/19-epoetin joceline (Epogen,Procrit) injection 10,000 Units on dialysis days..current hgb 8.9... will cont to monitor     CKD-MBD:  not on Phosphate Binder:  B complex-vitamin C-folic acid (Nephrocaps) capsule 1 capsule daily     Plan HD tomorrow with UF as tolerated     Renal diet      Please obtain daily standing wt (if possible)     Medication to be adjusted for ESRD      Patient to continue regular HD schedule while inpatient and to follow with the  outpatient nephrologist at discharge        LILLY Rebollar-CNP

## 2024-01-19 NOTE — CARE PLAN

## 2024-01-19 NOTE — CARE PLAN
Patient declined assistance. Stated she is going to change insurance and she did not need anything further at this time.

## 2024-01-19 NOTE — PROGRESS NOTES
Physical Therapy    Physical Therapy Treatment    Patient Name: Stacey Heath  MRN: [id number]  Today's Date: 1/19/2024  Time Calculation  Start Time: 1120  Stop Time: 1144  Time Calculation (min): 24 min       Assessment/Plan   PT Assessment  Evaluation/Treatment Tolerance: Patient limited by fatigue  Medical Staff Made Aware: Yes  End of Session Communication: Bedside nurse  Assessment Comment: Pt continues to demonstrate decreased activity tolerance and endurance. Continue to recommend LOW intensity PT after DC.  End of Session Patient Position: Alarm off, not on at start of session, Bed, 3 rail up (sitting EOB; RN aware)     PT Plan  Treatment/Interventions: Bed mobility, Transfer training, Gait training, Stair training, Balance training, Neuromuscular re-education, Strengthening, Endurance training, Therapeutic exercise, Home exercise program, Therapeutic activity, Positioning, Postural re-education  PT Plan: Skilled PT  PT Frequency: 2 times per week  PT Discharge Recommendations: Low intensity level of continued care  Equipment Recommended upon Discharge:  (rollator)  PT Recommended Transfer Status: Stand by assist  PT - OK to Discharge: Yes      General Visit Information:   PT  Visit  PT Received On: 01/19/24  Response to Previous Treatment: Patient with no complaints from previous session.  General  Prior to Session Communication: Bedside nurse  Patient Position Received: Bed, 3 rail up, Alarm off, not on at start of session  General Comment: Pt supine in bed upon entry to room. Pt pleasant, cooperative and willing to work with PT.    Subjective   Precautions:  Precautions  Medical Precautions: Fall precautions  Vital Signs:  Vital Signs  Heart Rate: 83  SpO2: 90 % (on room air when ambulating)    Objective   Pain:  Pain Assessment  Pain Assessment: 0-10  Pain Score: 0 - No pain  Cognition:  Cognition  Orientation Level: Oriented X4    Activity Tolerance:  Activity Tolerance  Endurance: Tolerates 10 - 20 min  exercise with multiple rests  Treatments:  Therapeutic Activity  Therapeutic Activity Performed: Yes  Therapeutic Activity 1: pt ambulating ~200ft with fww and cga, with pt taking frequent standing rest breaks and leaning against wall. Pt noted to have slightly unsteady gait, with pt having 2 LOB requiring min assist to correct. Verbal cuing provided throughout for safety awareness and device mangagement    Bed Mobility  Bed Mobility: Yes  Bed Mobility 1  Bed Mobility 1: Supine to sitting  Level of Assistance 1: Close supervision  Bed Mobility Comments 1: HOB elevated    Ambulation/Gait Training  Ambulation/Gait Training Performed: Yes  Ambulation/Gait Training 1  Surface 1: Level tile  Device 1: Rolling walker  Assistance 1: Contact guard  Quality of Gait 1:  (pt with decreased brendon adn noted 2 LOB with min assist to correct; occasional unsteady steps this date)  Comments/Distance (ft) 1: 200ft; frequent standing rest breaks  Transfers  Transfer: Yes  Transfer 1  Technique 1: Sit to stand, Stand to sit  Transfer Device 1: Walker  Transfer Level of Assistance 1: Close supervision  Trials/Comments 1: verbal cuing for hand placement    Outcome Measures:  [facility] Basic Mobility  Turning from your back to your side while in a flat bed without using bedrails: None  Moving from lying on your back to sitting on the side of a flat bed without using bedrails: None  Moving to and from bed to chair (including a wheelchair): A little  Standing up from a chair using your arms (e.g. wheelchair or bedside chair): A little  To walk in hospital room: A little  Climbing 3-5 steps with railing: A little  Basic Mobility - Total Score: 20    Education Documentation  Mobility Training, taught by Aide Correa, PT at 1/19/2024  3:12 PM.  Learner: Patient  Readiness: Acceptance  Method: Explanation  Response: Needs Reinforcement, Verbalizes Understanding    Education Comments  No comments found.        OP EDUCATION:       Encounter  Problems       Encounter Problems (Active)       Mobility       Pt will demonstrate WFL BLE strength to complete therapeutic exercise and participate in functional mobility.   (Progressing)       Start:  01/17/24    Expected End:  01/31/24            Pt will ambulate >250ft with LRAD and sba Assist with no LOB and VSS.   (Progressing)       Start:  01/17/24    Expected End:  01/31/24            Pt will complete the 6mwt and score within age related normative values.  (Progressing)       Start:  01/17/24    Expected End:  01/31/24               Pain - Adult          Transfers       Pt will perform all functional transfers with LRAD and [place] assist.   (Progressing)       Start:  01/17/24    Expected End:  01/31/24 01/19/24 at 3:13 PM - Aide Correa, PT

## 2024-01-19 NOTE — PROGRESS NOTES
Assessment/Plan   Ms. Heath is a 53y F PMH signficant for ESRD on HD Tu Th Sa (presumably secondary to T2DM and HTN), HFpEF who presented to ED with worsening dyspnea and chest pain in setting of nausea, vomiting, and diarrhea.  She is also having URI sx. n ED, markedly hypertensive 220/96, was found to have diffuse B lines, started on nitroglycerin gtt for afterload reduction.  On arrival to CICU, HTN had improved substantially.  Troponins negative x2, EKG without ischemic changes (evidence of LVH and repolarization abnormality).  No active CP, nausea or other symptoms, patient was resumed on home medications with improved blood pressure.  She was anemic and transfused 2 units of blood.  Now stable and transferred to the medical floor..      #Acute flash pulm edema - resolved  #Acute hypoxemic respiratory failure  #HFpEF  BP 220s/100 on presentation, B lines on ultrasound. S/p NIPPV and afterload reduction with improvement.   -Patient cannot take medications in the setting of nausea leading to markedly elevated blood pressure and flash pulmonary edema.   -Currently borderline oxygen requirement, continuing blood pressure management and fluid removal per nephrology.  Suspect markedly elevated blood pressure is (1) driven by nausea (2) did not keep antihypertensives down due to 4 episodes of emesis during the day.  - continue to wean o2 as tolerated, volume removal as tolerated per nephro(only 1L removed today), and ordered walking o2 eval. O2 removed andgoing to titrate needs at rest and walking today.      #Resistant HTN  -Patient has labile, difficult to treat HTN and is on numerous antihypertensives.  Recurrent presentations for hypertensive crisis.  CTA 2 months ago did not show adrenal mass or stenosis, TSH was normal, patient has ESRD which could be contributing.  Recommending outpatient sleep study.  -Continue  Regarding secondary HTN workup - she has CTA abdomen 2 months ago which showed no adrenal mass, no  "renal artery stenosis.  Checked TSH which was normal.  Of course, ESRD may result in resistnent HTN.  Patient does snore and BMI 31, which could suggest JAMSHID; never had sleep study.  - resume home clonidine 0.1mg bid, valsartan 320, Imdur 30, amlodipine 10mg  - limited by pt's inability to tolerate significant volume removal.      #Chest pain  troponins are negative, and EKG without any ischemic changes (LVH and some repolarization abnormality).       #DLD  - Cont atorva 80.    Dispo: pending evaluation of O2 needs and set up of HHC. ending weaning o2 and setting up home care.     Scheduled outpatient appointments in system:   No future appointments.    ---------------------------------------------------------------------------------------------------  Subjective   No new events.  Having itching on legs, responded to lotion. Breathing stable/improving but still feels winded. Still with some nasal congestion and developed pressure in her right ear, will be examining this afternoon. No other issues. O2 taken off at rest and going to titrate needs.     ---------------------------------------------------------------------------------------------------  Objective   Last Recorded Vitals  Blood pressure 162/74, pulse 85, temperature 36.5 °C (97.7 °F), resp. rate 17, height 1.397 m (4' 7\"), weight 61.3 kg (135 lb 2.3 oz), SpO2 100 %.  Intake/Output last 3 Shifts:  I/O last 3 completed shifts:  In: 2560 (41.8 mL/kg) [P.O.:1560; I.V.:600 (9.8 mL/kg); Other:400]  Out: 1000 (16.3 mL/kg) [Other:1000]  Weight: 61.3 kg     Physical Exam  Constitutional:       General: She is not in acute distress.     Appearance: Normal appearance. She is not ill-appearing.   HENT:      Head: Normocephalic and atraumatic.      Mouth/Throat:      Mouth: Mucous membranes are moist.   Eyes:      Extraocular Movements: Extraocular movements intact.      Pupils: Pupils are equal, round, and reactive to light.   Neck:      Comments: No " JVD.  Cardiovascular:      Rate and Rhythm: Normal rate and regular rhythm.      Comments: Systolic murmur present.    Pulmonary:      Effort: Pulmonary effort is normal.      Breath sounds: Normal breath sounds.      Comments: Diminished breath sounds  Abdominal:      General: Abdomen is flat.      Palpations: Abdomen is soft.   Musculoskeletal:         General: No swelling.      Cervical back: Neck supple.   Skin:     General: Skin is warm and dry.   Neurological:      General: No focal deficit present.      Mental Status: She is alert and oriented to person, place, and time.   Psychiatric:         Mood and Affect: Mood normal.         Relevant Results  Lab Results   Component Value Date    WBC 7.2 01/18/2024    HGB 8.4 (L) 01/18/2024    HCT 25.8 (L) 01/18/2024    MCV 94 01/18/2024     01/18/2024      Lab Results   Component Value Date    GLUCOSE 208 (H) 01/18/2024    CALCIUM 8.5 (L) 01/18/2024     01/18/2024    K 5.2 01/18/2024    CO2 24 01/18/2024     01/18/2024    BUN 50 (H) 01/18/2024    CREATININE 8.38 (H) 01/18/2024     Scheduled medications  albuterol, 2 puff, inhalation, BID  amLODIPine, 10 mg, oral, Daily  atorvastatin, 80 mg, oral, Nightly  carvedilol, 25 mg, oral, BID  cloNIDine, 0.1 mg, oral, BID  fluticasone furoate-vilanteroL, 1 puff, inhalation, Daily  gabapentin, 300 mg, oral, Nightly  heparin, 5,000 Units, subcutaneous, q8h  hydrALAZINE, 100 mg, oral, q8h  [Held by provider] isosorbide mononitrate ER, 30 mg, oral, Daily  pantoprazole, 40 mg, oral, Daily  [Held by provider] valsartan, 320 mg, oral, Daily      Continuous medications     PRN medications  PRN medications: acetaminophen, [MAR Hold] benzocaine-menthol, eucerin, oxygen    Al Painter MD

## 2024-01-20 ENCOUNTER — APPOINTMENT (OUTPATIENT)
Dept: DIALYSIS | Facility: HOSPITAL | Age: 54
End: 2024-01-20
Payer: COMMERCIAL

## 2024-01-20 ENCOUNTER — PHARMACY VISIT (OUTPATIENT)
Dept: PHARMACY | Facility: CLINIC | Age: 54
End: 2024-01-20
Payer: MEDICARE

## 2024-01-20 VITALS
HEIGHT: 55 IN | SYSTOLIC BLOOD PRESSURE: 178 MMHG | OXYGEN SATURATION: 95 % | RESPIRATION RATE: 17 BRPM | BODY MASS INDEX: 31.28 KG/M2 | WEIGHT: 135.14 LBS | TEMPERATURE: 97.7 F | HEART RATE: 90 BPM | DIASTOLIC BLOOD PRESSURE: 74 MMHG

## 2024-01-20 PROBLEM — K85.90 ACUTE PANCREATITIS (HHS-HCC): Status: RESOLVED | Noted: 2024-01-20 | Resolved: 2024-01-20

## 2024-01-20 PROBLEM — R05.9 COUGH, UNSPECIFIED: Status: ACTIVE | Noted: 2023-02-11

## 2024-01-20 PROBLEM — I50.32 CHRONIC HEART FAILURE WITH PRESERVED EJECTION FRACTION (HFPEF) (MULTI): Status: ACTIVE | Noted: 2023-02-28

## 2024-01-20 PROBLEM — I21.A1 TYPE 2 MYOCARDIAL INFARCTION (MULTI): Status: RESOLVED | Noted: 2024-01-20 | Resolved: 2024-01-20

## 2024-01-20 PROBLEM — E66.9 OBESITY, CLASS I, BMI 30-34.9: Status: ACTIVE | Noted: 2022-11-11

## 2024-01-20 PROBLEM — H52.209 ASTIGMATISM: Status: ACTIVE | Noted: 2017-03-07

## 2024-01-20 PROBLEM — N28.9 NEPHROPATHY: Status: ACTIVE | Noted: 2021-05-14

## 2024-01-20 PROBLEM — B96.89 ACUTE PYELONEPHRITIS DUE TO BACTERIA: Status: RESOLVED | Noted: 2023-01-16 | Resolved: 2024-01-20

## 2024-01-20 PROBLEM — J96.90 RESPIRATORY FAILURE (MULTI): Status: ACTIVE | Noted: 2023-08-23

## 2024-01-20 PROBLEM — N18.9 CHRONIC RENAL FAILURE SYNDROME: Status: RESOLVED | Noted: 2022-01-10 | Resolved: 2024-01-20

## 2024-01-20 PROBLEM — T83.193A: Status: RESOLVED | Noted: 2023-02-25 | Resolved: 2024-01-20

## 2024-01-20 PROBLEM — E78.5 COMPLEX DYSLIPIDEMIA: Status: ACTIVE | Noted: 2022-01-06

## 2024-01-20 PROBLEM — H25.13 NUCLEAR SENILE CATARACT OF BOTH EYES: Status: ACTIVE | Noted: 2018-10-17

## 2024-01-20 PROBLEM — N10 ACUTE PYELONEPHRITIS DUE TO BACTERIA: Status: RESOLVED | Noted: 2023-01-16 | Resolved: 2024-01-20

## 2024-01-20 PROBLEM — J81.0 FLASH PULMONARY EDEMA (MULTI): Status: RESOLVED | Noted: 2024-01-20 | Resolved: 2024-01-20

## 2024-01-20 PROBLEM — R31.9 HEMATURIA: Status: RESOLVED | Noted: 2023-02-25 | Resolved: 2024-01-20

## 2024-01-20 PROBLEM — R06.09 DYSPNEA ON EXERTION: Status: ACTIVE | Noted: 2021-08-03

## 2024-01-20 PROBLEM — M79.603 PAIN IN UPPER LIMB: Status: RESOLVED | Noted: 2024-01-20 | Resolved: 2024-01-20

## 2024-01-20 PROBLEM — G43.909 MIGRAINE HEADACHE: Status: RESOLVED | Noted: 2023-02-25 | Resolved: 2024-01-20

## 2024-01-20 PROBLEM — R87.612 LOW GRADE SQUAMOUS INTRAEPITHELIAL LESION (LGSIL) ON CERVICOVAGINAL CYTOLOGIC SMEAR: Status: RESOLVED | Noted: 2023-02-25 | Resolved: 2024-01-20

## 2024-01-20 PROBLEM — J18.9 COMMUNITY ACQUIRED PNEUMONIA: Status: RESOLVED | Noted: 2021-11-03 | Resolved: 2024-01-20

## 2024-01-20 PROBLEM — E11.42 POLYNEUROPATHY DUE TO TYPE 2 DIABETES MELLITUS (MULTI): Status: ACTIVE | Noted: 2023-02-25

## 2024-01-20 PROBLEM — I31.4: Status: RESOLVED | Noted: 2024-01-20 | Resolved: 2024-01-20

## 2024-01-20 PROBLEM — N17.9 AKI (ACUTE KIDNEY INJURY) (CMS-HCC): Status: RESOLVED | Noted: 2021-08-03 | Resolved: 2024-01-20

## 2024-01-20 PROBLEM — J90 PLEURAL EFFUSION: Status: RESOLVED | Noted: 2023-12-12 | Resolved: 2024-01-20

## 2024-01-20 PROBLEM — N17.9 ACUTE RENAL FAILURE (CMS-HCC): Status: RESOLVED | Noted: 2023-02-25 | Resolved: 2024-01-20

## 2024-01-20 PROBLEM — L74.9 DISORDER OF SWEAT GLANDS: Status: RESOLVED | Noted: 2023-02-25 | Resolved: 2024-01-20

## 2024-01-20 PROBLEM — M25.512 BILATERAL SHOULDER PAIN: Status: RESOLVED | Noted: 2023-02-25 | Resolved: 2024-01-20

## 2024-01-20 PROBLEM — D64.9 NORMOCYTIC NORMOCHROMIC ANEMIA: Status: ACTIVE | Noted: 2023-10-02

## 2024-01-20 PROBLEM — E66.811 OBESITY, CLASS I, BMI 30-34.9: Status: ACTIVE | Noted: 2022-11-11

## 2024-01-20 PROBLEM — A41.9 SEPSIS (MULTI): Status: RESOLVED | Noted: 2024-01-20 | Resolved: 2024-01-20

## 2024-01-20 PROBLEM — I25.10 CORONARY ARTERY DISEASE INVOLVING NATIVE CORONARY ARTERY: Status: ACTIVE | Noted: 2023-02-25

## 2024-01-20 PROBLEM — N89.8 VAGINAL DRYNESS: Status: RESOLVED | Noted: 2023-02-25 | Resolved: 2024-01-20

## 2024-01-20 PROBLEM — L73.2 HIDRADENITIS: Status: ACTIVE | Noted: 2024-01-20

## 2024-01-20 PROBLEM — I50.1 ACUTE PULMONARY EDEMA WITH HEART DISEASE (MULTI): Status: ACTIVE | Noted: 2023-04-18

## 2024-01-20 PROBLEM — M54.50 LOW BACK PAIN: Status: ACTIVE | Noted: 2021-07-14

## 2024-01-20 PROBLEM — F17.200 NICOTINE USE DISORDER: Status: ACTIVE | Noted: 2020-03-27

## 2024-01-20 PROBLEM — M25.511 BILATERAL SHOULDER PAIN: Status: RESOLVED | Noted: 2023-02-25 | Resolved: 2024-01-20

## 2024-01-20 PROBLEM — J18.9 PNEUMONIA: Status: RESOLVED | Noted: 2023-04-11 | Resolved: 2024-01-20

## 2024-01-20 PROBLEM — B97.7 HIGH RISK HPV INFECTION: Status: RESOLVED | Noted: 2023-02-25 | Resolved: 2024-01-20

## 2024-01-20 PROBLEM — Z94.9 ORGAN OR TISSUE REPLACED BY TRANSPLANT: Status: RESOLVED | Noted: 2023-02-25 | Resolved: 2024-01-20

## 2024-01-20 PROBLEM — I10 POORLY-CONTROLLED HYPERTENSION: Status: ACTIVE | Noted: 2024-01-20

## 2024-01-20 LAB
FLUAV RNA RESP QL NAA+PROBE: NOT DETECTED
FLUBV RNA RESP QL NAA+PROBE: NOT DETECTED
SARS-COV-2 RNA RESP QL NAA+PROBE: NOT DETECTED

## 2024-01-20 PROCEDURE — 2500000002 HC RX 250 W HCPCS SELF ADMINISTERED DRUGS (ALT 637 FOR MEDICARE OP, ALT 636 FOR OP/ED): Performed by: STUDENT IN AN ORGANIZED HEALTH CARE EDUCATION/TRAINING PROGRAM

## 2024-01-20 PROCEDURE — 2500000001 HC RX 250 WO HCPCS SELF ADMINISTERED DRUGS (ALT 637 FOR MEDICARE OP): Performed by: STUDENT IN AN ORGANIZED HEALTH CARE EDUCATION/TRAINING PROGRAM

## 2024-01-20 PROCEDURE — RXMED WILLOW AMBULATORY MEDICATION CHARGE

## 2024-01-20 PROCEDURE — 2500000004 HC RX 250 GENERAL PHARMACY W/ HCPCS (ALT 636 FOR OP/ED): Performed by: STUDENT IN AN ORGANIZED HEALTH CARE EDUCATION/TRAINING PROGRAM

## 2024-01-20 PROCEDURE — 90935 HEMODIALYSIS ONE EVALUATION: CPT | Performed by: INTERNAL MEDICINE

## 2024-01-20 PROCEDURE — 8010000001 HC DIALYSIS - HEMODIALYSIS PER DAY

## 2024-01-20 PROCEDURE — 94640 AIRWAY INHALATION TREATMENT: CPT

## 2024-01-20 PROCEDURE — 99239 HOSP IP/OBS DSCHRG MGMT >30: CPT | Performed by: NURSE PRACTITIONER

## 2024-01-20 RX ORDER — FLUTICASONE PROPIONATE 50 MCG
2 SPRAY, SUSPENSION (ML) NASAL DAILY
Qty: 16 G | Refills: 12 | Status: SHIPPED | OUTPATIENT
Start: 2024-01-20

## 2024-01-20 RX ADMIN — HYDRALAZINE HYDROCHLORIDE 100 MG: 50 TABLET, FILM COATED ORAL at 17:15

## 2024-01-20 RX ADMIN — ACETAMINOPHEN 650 MG: 325 TABLET ORAL at 13:20

## 2024-01-20 RX ADMIN — CLONIDINE HYDROCHLORIDE 0.1 MG: 0.1 TABLET ORAL at 17:15

## 2024-01-20 RX ADMIN — CARVEDILOL 25 MG: 25 TABLET, FILM COATED ORAL at 17:15

## 2024-01-20 RX ADMIN — FLUTICASONE FUROATE AND VILANTEROL TRIFENATATE 1 PUFF: 100; 25 POWDER RESPIRATORY (INHALATION) at 08:45

## 2024-01-20 RX ADMIN — RENO CAPS 1 CAPSULE: 100; 1.5; 1.7; 20; 10; 1; 150; 5; 6 CAPSULE ORAL at 17:15

## 2024-01-20 RX ADMIN — HYDRALAZINE HYDROCHLORIDE 100 MG: 50 TABLET, FILM COATED ORAL at 02:20

## 2024-01-20 RX ADMIN — PANTOPRAZOLE SODIUM 40 MG: 40 TABLET, DELAYED RELEASE ORAL at 17:15

## 2024-01-20 RX ADMIN — HEPARIN SODIUM 5000 UNITS: 5000 INJECTION INTRAVENOUS; SUBCUTANEOUS at 02:42

## 2024-01-20 RX ADMIN — AMLODIPINE BESYLATE 10 MG: 10 TABLET ORAL at 17:15

## 2024-01-20 RX ADMIN — ALBUTEROL SULFATE 2 PUFF: 90 AEROSOL, METERED RESPIRATORY (INHALATION) at 08:48

## 2024-01-20 RX ADMIN — ACETAMINOPHEN 650 MG: 325 TABLET ORAL at 02:42

## 2024-01-20 ASSESSMENT — PAIN - FUNCTIONAL ASSESSMENT
PAIN_FUNCTIONAL_ASSESSMENT: 0-10
PAIN_FUNCTIONAL_ASSESSMENT: NO/DENIES PAIN
PAIN_FUNCTIONAL_ASSESSMENT: NO/DENIES PAIN

## 2024-01-20 ASSESSMENT — PAIN SCALES - GENERAL
PAINLEVEL_OUTOF10: 6
PAINLEVEL_OUTOF10: 2

## 2024-01-20 ASSESSMENT — PAIN DESCRIPTION - LOCATION: LOCATION: HEAD

## 2024-01-20 NOTE — PROGRESS NOTES
CNP updated there is still no accepting home care agency via [facility] and asked to provide pt with an rx for outpatient therapy.     Dorothy Ramos RN  Transitional Care Coordinator/TCC  [phone]

## 2024-01-20 NOTE — PROGRESS NOTES
Renal Staff HD Note    Patient seen, examined on dialysis   Tolerating treatment without event  168/82  AVF 3k    Continue treatment per submitted orders  2L as alexis

## 2024-01-20 NOTE — NURSING NOTE
Report to Receiving RN:    Report To: Dona Blanco Report Called: [id number]  Hand-Off Communication: 1.9 L of fluid removed, /75, P 88. Pt co sore throat and thinks she is getting a cold.   Complications During Treatment: No  Ultrafiltration Treatment: Yes  Medications Administered During Dialysis: No  Blood Products Administered During Dialysis: No  Labs Sent During Dialysis: No  Heparin Drip Rate Changes: N/A      Last Updated: 4:41 PM by ROXY KWOK

## 2024-01-20 NOTE — NURSING NOTE
Report from Sending RN:    Report From: Dona Rubio RN)  Recent Surgery of Procedure: No  Baseline Level of Consciousness (LOC): A/O x 4  Oxygen Use: No  Type: none  Diabetic: No  Last BP Med Given Day of Dialysis: none  Last Pain Med Given: none  Lab Tests to be Obtained with Dialysis: Yes  Blood Transfusion to be Given During Dialysis: No  Available IV Access: no  Medications to be Administered During Dialysis: No  Continuous IV Infusion Running: No  Restraints on Currently or in the Last 24 Hours: No  Hand-Off Communication: no acute overnight or morning  or morning events; vss; Pt did take morning medications; Pt will need labs; Pt may go off unit without telemetry; Pt is a full code; Cassidy Gonzalez RN,

## 2024-01-20 NOTE — DISCHARGE SUMMARY
Discharge Diagnosis  Acute pulmonary edema with heart disease (CMS/HCC)    Issues Requiring Follow-Up  Nephrology for continued ESRD  Cardiology for flash pulmonary edema    Test Results Pending At Discharge  Pending Labs       Order Current Status    Methylmalonic acid, serum In process            Hospital Course  Ms. Heath is a 53y F PMH signficant for ESRD on HD Tu Th Sa (presumably secondary to T2DM and HTN), HFpEF who presented to ED with worsening dyspnea and chest pain in setting of nausea, vomiting, and diarrhea.  She is also having URI sx. n ED, markedly hypertensive 220/96, was found to have diffuse B lines, started on nitroglycerin gtt for afterload reduction.  On arrival to CICU, HTN had improved substantially.  Troponins negative x2, EKG without ischemic changes (evidence of LVH and repolarization abnormality).  No active CP, nausea or other symptoms, patient was resumed on home medications with improved blood pressure.  She was anemic and transfused 2 units of blood.  Now stable and transferred to the medical floor. Was weaned off oxygen.  Does currently report an upper respiratory cold with no hypoxia.  Walking Pulse Ox 93% on room air.  Lungs clear.  Patient to go to dialysis today and discharge after.      Pertinent Physical Exam At Time of Discharge  Physical Exam  Constitutional:       Appearance: She is obese.   HENT:      Head: Normocephalic.      Nose: Nose normal.      Mouth/Throat:      Mouth: Mucous membranes are moist.      Pharynx: Oropharynx is clear.   Eyes:      Conjunctiva/sclera: Conjunctivae normal.   Cardiovascular:      Rate and Rhythm: Normal rate and regular rhythm.      Pulses: Normal pulses.      Heart sounds: Murmur heard.   Pulmonary:      Effort: Pulmonary effort is normal.      Breath sounds: Normal breath sounds.      Comments: diminished  Abdominal:      General: Abdomen is flat. Bowel sounds are normal.      Palpations: Abdomen is soft.   Musculoskeletal:          General: Normal range of motion.      Cervical back: Normal range of motion.   Skin:     General: Skin is warm and dry.      Capillary Refill: Capillary refill takes less than 2 seconds.   Neurological:      General: No focal deficit present.      Mental Status: She is alert and oriented to person, place, and time.   Psychiatric:         Mood and Affect: Mood normal.         Home Medications     Medication List      START taking these medications     benzocaine-menthol 15-3.6 mg lozenge; Commonly known as: Cepastat Sore   Throat; Dissolve 1 lozenge in the mouth every 2 hours if needed for sore   throat for up to 7 days.   epoetin joceline 10,000 unit/mL injection; Commonly known as:   Epogen,Procrit; Inject 1 mL (10,000 Units) under the skin 3 times a week.;   Start taking on: January 22, 2024   fluticasone 50 mcg/actuation nasal spray; Commonly known as: Flonase;   Administer 2 sprays into each nostril once daily. Shake gently. Before   first use, prime pump. After use, clean tip and replace cap.   sodium chloride 0.65 % nasal spray; Commonly known as: Ocean; Administer   1 spray into each nostril 4 times a day as needed for congestion.     CONTINUE taking these medications     acetaminophen 325 mg tablet; Commonly known as: Tylenol; Take 3 tablets   (975 mg) by mouth every 8 hours if needed for mild pain (1 - 3).   * albuterol 1.25 mg/3 mL nebulizer solution; Take 3 mL (1.25 mg) by   nebulization every 6 hours if needed for wheezing.   * albuterol 90 mcg/actuation inhaler; Commonly known as: Ventolin HFA;   Inhale 2 puffs 2 times a day.   amLODIPine 10 mg tablet; Commonly known as: Norvasc; TAKE 1 TABLET BY   MOUTH ONCE DAILY BEFORE DIALYSIS   atorvastatin 80 mg tablet; Commonly known as: Lipitor; Take 1 tablet (80   mg) by mouth once daily at bedtime.   carvedilol 25 mg tablet; Commonly known as: Coreg; Take 1 tablet (25 mg)   by mouth 2 times a day.   cloNIDine 0.1 mg tablet; Commonly known as: Catapres; Take 1  tablet (0.1   mg) by mouth 2 times a day.   fluticasone propion-salmeteroL 100-50 mcg/dose diskus inhaler; Commonly   known as: Advair Diskus; Inhale 1 puff once daily.   gabapentin 300 mg capsule; Commonly known as: Neurontin; Take 1 capsule   (300 mg) by mouth once daily at bedtime.   hydrALAZINE 100 mg tablet; Commonly known as: Apresoline; Take 1 tablet   (100 mg) by mouth every 8 hours.   hydrOXYzine HCL 10 mg tablet; Commonly known as: Atarax; Take 1 tablet   (10 mg) by mouth every 6 hours if needed for itching.   isosorbide mononitrate ER 30 mg 24 hr tablet; Commonly known as: Imdur;   Take 1 tablet (30 mg) by mouth once daily. Do not crush or chew.   metoclopramide 5 mg tablet; Commonly known as: Reglan; TAKE 1 TABLET BY   MOUTH EVERY 6 HOURS AS NEEDED   ondansetron 4 mg tablet; Commonly known as: Zofran; Take 1 tablet (4 mg)   by mouth every 8 hours if needed for nausea or vomiting.   pantoprazole 40 mg EC tablet; Commonly known as: ProtoNix; Take 1 tablet   (40 mg) by mouth once daily. Do not crush, chew, or split.   SUMAtriptan 50 mg tablet; Commonly known as: Imitrex; Take 1 tablet (50   mg) by mouth 1 time if needed for migraine.   Tab-A-Lucía tablet; Generic drug: multivitamin; Take 1 tablet by mouth   once daily.   valsartan 320 mg tablet; Commonly known as: Diovan; Take 1 tablet (320   mg) by mouth once daily.  * This list has 2 medication(s) that are the same as other medications   prescribed for you. Read the directions carefully, and ask your doctor or   other care provider to review them with you.       Outpatient Follow-Up  No future appointments.    Margarita Meza, APRN-CNP    Discharge time over 35 minutes

## 2024-01-20 NOTE — CARE PLAN

## 2024-01-23 ENCOUNTER — DOCUMENTATION (OUTPATIENT)
Dept: BEHAVIORAL HEALTH | Facility: CLINIC | Age: 54
End: 2024-01-23
Payer: COMMERCIAL

## 2024-01-24 ENCOUNTER — DOCUMENTATION (OUTPATIENT)
Dept: BEHAVIORAL HEALTH | Facility: CLINIC | Age: 54
End: 2024-01-24
Payer: COMMERCIAL

## 2024-01-24 NOTE — PROGRESS NOTES
Stacey Heath  Age: 53 y.o.  MRN: [id number]  Date: 1/23/2024  Location of service: phone call    Program Details  Medicaid Community Clinical Case Management  Status: Enrolled  Effective Dates: 10/17/2023 - present  Responsible Staff: PORFIRIO Valderrama      Goals Reviewed:        Summary:  This writer called patient to remind her of our meeting at [facility] tomorrow at 11am. Patient confirms she will be there.    Appointment start time: 1549  Appointment completion time: 1551  Total time spent with patient (in minutes): 2      Roberta Gallego RN

## 2024-01-25 ENCOUNTER — TELEPHONE (OUTPATIENT)
Dept: TRANSPLANT | Facility: HOSPITAL | Age: 54
End: 2024-01-25

## 2024-01-25 ENCOUNTER — DOCUMENTATION (OUTPATIENT)
Dept: BEHAVIORAL HEALTH | Facility: CLINIC | Age: 54
End: 2024-01-25
Payer: COMMERCIAL

## 2024-01-25 NOTE — PROGRESS NOTES
Stacey Heath  Age: 53 y.o.  MRN: [id number]  Date: 1/24/2024  Location of service: phone call    Program Details  Medicaid Community Clinical Case Management  Status: Enrolled  Effective Dates: 10/17/2023 - present  Responsible Staff: PORFIRIO Valderrama      Goals Reviewed:        Summary:  This writer called patient to confirm she would be coming to [facility] this morning at 11am. Patient did not answer at this time. This writer left a voicemail.    Appointment start time: 1016  Appointment completion time: 1017  Total time spent with patient (in minutes): 1      Roberta Gallego RN

## 2024-01-25 NOTE — PROGRESS NOTES
Stacey Heath  Age: 53 y.o.  MRN: [id number]  Date: 1/24/2024  Location of service: in community    Program Details  Medicaid Community Clinical Case Management  Status: Enrolled  Effective Dates: 10/17/2023 - present  Responsible Staff: PORFIRIO Valderrama      Goals Reviewed:  Problem: Anxiety       Goal: Attempts to manage anxiety with help       Priority: High         Goal: Verbalizes ways to manage anxiety       Priority: High         Goal: Implements measures to reduce anxiety       Priority: High        Problem: Financial Stressors       Goal: Assistance with financial concerns         Problem: Risk of Uncoordinated Care       Goal: Care will be Coordinated and Supported by a Multidisciplinary Team of Providers       Priority: High          Summary:  This writer met with patient at [facility] in order to change her Medicaid code.  While we waited to be seen:  Patient discussed being excited to go back to Christianity after 2 months of not being able to go.  Patient discussed watching Christianity and another  in order to help her spirituality while she wasn't able to go to Christianity.  Patient spent time discussing her Christianity service.  Patient discussed her positive attitude in the face of struggles being a result of her relationship with God.  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  Patient discussed her relationship with Hai's nephew who he is in the process of adopting.  Patient states she is still drinking broth, but states she's getting tired of it.  Patient states she is hungry but once she starts eating she gets immediately nauseous.  She reports eating soft things that are high protein like eggs, but states eggs are not as filling as broth.  He providers have recommended Boost, Ensure, or Magic Cup. Patient cannot afford these options.  This writer and patient will look into adding high calorie power mix into regular pudding.  Patient called daughter with this writer present and we  looked into high calorie options.  Patient states she has a gastro appointment in February.  Patient discussed her relationship with her daughters   Patient discussed problems with her current landlord not fixing things. She wrote a letter that she will be giving him and if he doesn't start fixing things she is going to go to escrow.  Patient requests assistance with scheduling PCP appt.  When we were seen:  [facility] states they don't know anything about changing a Medicaid code, however they send a message to the Medicaid specialists and they state we will hear from them by the end of the business day on Friday.   She states that after the Medicaid dept takes care of the problem that we should be able to call the Medicaid Managed Care phone number and [facility]    Appointment start time: 1105  Appointment completion time: 1430  Total time spent with patient (in minutes): 205      Roberta Gallego RN

## 2024-01-25 NOTE — PROGRESS NOTES
Stacey Heath  Age: 53 y.o.  MRN: [id number]  Date: 1/24/2024  Location of service: phone call    Program Details  Medicaid Community Clinical Case Management  Status: Enrolled  Effective Dates: 10/17/2023 - present  Responsible Staff: PORFIRIO Valderrama      Goals Reviewed:        Summary:  This writer called patient to confirm she would be coming to [facility] this morning at 11am. Patient confirms she will be there.    Appointment start time: 1042  Appointment completion time: 1045  Total time spent with patient (in minutes): 3      Roberta Gallego RN

## 2024-01-26 NOTE — PROGRESS NOTES
Stacey Heath  Age: 53 y.o.  MRN: [id number]  Date: 1/25/2024  Location of service: phone call    Program Details  Medicaid Community Clinical Case Management  Status: Enrolled  Effective Dates: 10/17/2023 - present  Responsible Staff: PORFIRIO Valderrama      Goals Reviewed:  Problem: Risk of Uncoordinated Care       Goal: Care will be Coordinated and Supported by a Multidisciplinary Team of Providers       Priority: High          Summary:  Patient states [employer] called her and they state they don't know anything about a code, they state they will call [facility] and get back to us.    Appointment start time: 1320  Appointment completion time: 1323  Total time spent with patient (in minutes): 3      Roberta Gallego RN

## 2024-02-02 ENCOUNTER — DOCUMENTATION (OUTPATIENT)
Dept: BEHAVIORAL HEALTH | Facility: CLINIC | Age: 54
End: 2024-02-02

## 2024-02-02 NOTE — PROGRESS NOTES
Provider called patient to check in and get scheduled for CM services. Patient stated she could not see provider the following week due to many appointments. Provider and patient scheduled to meet Feb. 12th. Patient also stated she is having issues with contacting [employer]. Provider will look into assisting patient.

## 2024-02-05 ENCOUNTER — TELEPHONE (OUTPATIENT)
Dept: TRANSPLANT | Facility: HOSPITAL | Age: 54
End: 2024-02-05

## 2024-02-05 ENCOUNTER — SOCIAL WORK (OUTPATIENT)
Dept: TRANSPLANT | Facility: HOSPITAL | Age: 54
End: 2024-02-05
Payer: COMMERCIAL

## 2024-02-05 ENCOUNTER — DOCUMENTATION (OUTPATIENT)
Dept: BEHAVIORAL HEALTH | Facility: CLINIC | Age: 54
End: 2024-02-05

## 2024-02-05 DIAGNOSIS — F41.9 ANXIETY: ICD-10-CM

## 2024-02-05 PROCEDURE — H2019 THER BEHAV SVC, PER 15 MIN: HCPCS | Mod: AJ,HE | Performed by: SOCIAL WORKER

## 2024-02-05 NOTE — TELEPHONE ENCOUNTER
Talked to pt, let her know SW had cleared her so we were able to get her started in the eval process and that we would call her in the next couple of days to get her the education video and her clinic appointments scheduled.    Encouraged her to keep in contact with us during her eval journey if there were any challenges and if there was anything we could do to assist her.    No other questions at this time.

## 2024-02-05 NOTE — PROGRESS NOTES
"ENCOUNTER    Visit Type Initial Visit  Location: [facility] 1800    Barriers to Communication / Understanding:   [] Language [] Vision [] Hearing [] Other      []  Present     Accompanied By:  Behavioral Health Coordinator, Dora, and Daughter, Meche    Organ For Transplant:  Kidney    Ethnicity:  Black Or     ADLs Fully Independent      Instrumental ADLs Fully Independent      Level of Activity Sedentary       DME: Denied    Knowledge of Health Good  HTN    Why Do You Have End Stage Organ Disease   Pt reported the cause of her kidney disease is diabetes.    Knowledge of Transplant / VAD:  Yes Patient Is Able To Make An Informed Decision    Patient Understands the Risks of Transplant / VAD  Yes Rejection  Yes Infection Yes Complications  Yes Death  Pt was unable to name any risks of transplant due to not receiving education at this time. SW reviewed the above risks with Pt.     Patient Understands Recovery and Follow-Up from Transplant / VAD  Yes Length Of State Yes Appointments  Yes Labs  Yes Rehabilitation  Pt was unable to recall any recovery information due to not receiving education at this time. SW reviewed the above information with Pt.    Patient Has Identified Goals of Transplant / VAD Yes  Pt reported a goal of transplant is \"to get my life back.\"    Any Potential Donors?     Overall Compliance  Good   Pt reported she takes 13 medications daily.      Compliance With Medications Good    Managed By Patient   Pillbox    Understanding Of Medication  Good  Compliance With Appointments Good    How Does Patient Handle Health Problems      Organ  Kidney    IOP:     Fluid Restriction:   Yes [x] Compliant   Pt reported she is restricted to 24 oz. Pt shared she \"stays way under\" this amount.    Medical And Clinic Appointment Compliant     Dialysis:  [x] What Dialysis Center   [facility] [x] Began   Almost 3 years ago      [] In Center [] Home Hemo [] Peritoneal " "      Attendance:  Treatment Attendance Good Treatment Time T, Th, Sat - Runs for 3 hours    [x] Shortened Treatments [] Rescheduled Treatments [] Missed Treatments   Pt reported she will shorten treatments \"only if I have to.\" Pt shared she would shorten often before being switched to hypoallergenic equipment about a year ago due to a reaction from the other equipment. Pt reported now she shortens treatments if her BP is low but stated this happens less than monthly.     Fluids:     Does Patient Still Urinate  [] Fluid Restriction [] IDWG [] EDW  Achieved Dry Weight        Diet:   Patient is compliant with renal restrictions     Patient is compliant with low sodium diet      Labs:    [] Patient Reported [] Collateral       []  Potassium [] Albumin [] Phosphorus      # of Binders:  [x] # of Binders per meal   1 [x] Meals per day   1-2      []  # of Binders per Snack [] Snacks per day [] Phosphorus     Pancreas:  [] Checks blood sugar      times/day [] A1c   Hypoglycemic Episodes  Unawareness  Outside Interventions    Liver:  Is Lactulose prescribed  Dose:   Timesper day:  Is patient compliant       SOCIAL HISTORY  No       Education:  education: GED      Literate Yes   Computer literate Yes  Internet access Yes       Sources of Income: SSDI ($1300 monthly), food stamps ($150 monthly)  Patient's Current Employment    [] Full-Time [] Part-Time [] Unemployed [] Retired     []  None [] Not working by choice [x] Not working disabled     [] Short Term Leave   [] Other   Employment History     Will patient have paid status from employment during recovery     Spouse / SO Current Employment Full Time      Will spouse / SO have paid status from employment during recovery   Pt reported she is unsure if her fiance will have access to paid status.     Other Sources of Income Total Household Income $48,000 yearly      Does patient have financial concerns   Yes  Pt reported all of her bills are paid but feels there is " "\"never enough\" after bills.     Is patient able to meet current monthly expenses   Yes    Resources:    Patient was provided information on transplant fundraising       Insurance:  Primary Insurance Medicaid [employer]    Secondary Insurance     Prescription Coverage Copay cost per month $0    Medicare coverage due to     Medicare Part A  Effective date    Medicare Part B  Effective date    Medicare Part C  Deductable  Out of Pocket Max    Medicare Part D  Extra Help?    Medicaid  Waiver  Redetermination Date    HMO       FAMILY SYSTEM    Single     How long   Describe Relationship    How long   Describe Relationship    When    When  In a Relationship Yes  How Long 10 years Describe Relationship \"Best thing I ever had\"    Spouse / SO Name Hai  Age 56  Health  \"No issues, HTN\"  Other Caregiver Responsibilities  Spouse / Significant others reaction to donation    Children:  Yes # Biological (3 daughters, 1 son)   # Adopted    # Step Children        Child #1 Name Diya  Age 33  Health \"Fine\"    Lives Local  How Much Contact Daily    Child #2 Name Meche    Age 32  Health \"Wonderful\"    Lives Local  How Much Contact Daily    Child #3 Name Ross  Age 31 Health \"A little bit of issues\"    Lives Local  How Much Contact Daily    Child #4 Name Elliottja  Age 21  Health \"Enlarged heart\"    Lives Local  How Much Contact Daily    Parents:  Raised By One Biological Parent (father), grandmother    Did the patient have contact with the other parent Yes    Mother  Yes Age Unsure   Cause of Death Bladder Cancer  Father  Yes Age 56   Cause of Death Stroke    Living Parent #1  Living Parent #2    Additional Information    Siblings:  [x] # Biological (1 sister) [x] # Half Siblings (2 brothers, 3 sisters) [] # Step Siblings     Sibling #1  Sibling #2    Support & Recovery Plan:  Yes Adequate    Primary Support:  Name Meche Phone [phone]  Age 32  Relationship to Patient " Daughter  If employed, can they take time off work Yes   If so, is it paid time off Yes   If not, will this impact your finances   Did they attend education classes   Do they have other caregiver responsibilities (child or eldercare) No   Do they have their own conditions which may prevent them from providing care for you No   (Medical, psychological, physical limitations)    Are they available on short notice Yes   Are they reliable Yes   Are they responsible Yes   Are they able to understand and process new information Yes   Do they have reliable transportation or will you allow them to use your vehicle Yes   Are they currently involved in your care Yes   Comments    Secondary Support:  Name Diya Phone [phone] Age 34  Relationship to Patient Daughter  If employed, can they take time off work Yes   If so, is it paid time off Yes   If not, will this impact your finances    Did they attend education classes    Do they have other caregiver responsibilities (child or eldercare) Yes   Pt reported the child's father can watch the child.   Do they have their own conditions which may prevent them from providing care for you No  (Medical, psychological, physical limitations)    Are they available on short notice Yes   Are they reliable Yes   Are they responsible Yes   Are they able to understand and process new information Yes   Do they have reliable transportation or will you allow them to use your vehicle Yes   Are they currently involved in your care Yes   Comments  SW confirmed secondary support via phone call during assessment.    Alternate Support  Alternate Support    Housing:  Yes Adequate Rents home  Type of Home House  Distance to [facility] 17 minutes  Pets 0  Does Patient Feel Safe in Home Yes      Transportation:  Yes Adequate  # Licensed Drivers in the Home 1  Does Patient Drive No If not, why   No license  # Reliable Vehicles 1  Does Patient use Public Transportation No  Does Patient use Medical  "Transportation No  Comments    MENTAL HEALTH    Cognition:  No impairment observed / reported    The patient reports their mood as \"I'm fine.\"    Reported Mental Health Diagnosis   Anxiety   Family History of Mental Health Concerns   Denied  What are patient psychosocial stressors   Pt reported finances, worrying about her children and grandchildren, home repairs, and her health are current stressors.      Current Medications:  No  Mental Health Meds  Denied  Rx'd by   Sleep Meds  Denied  Rx'd by   Pain Meds  Denied  Rx'd by     OTC Meds   Tylenol  Past Medications   One previous MH medication (unsure of name)    Counseling Currently  Pt reported she is involved with case management through  Behavioral Health department due to frequent ED visits (about 15 in the past year). Pt shared she has weekly visits, either at home or in hospital, with her behavioral health coordinator. Pt reported she started case management in October and finds it helpful. Pt's behavioral health coordinator reported Pt has access to a counselor through their program if needed. SW provided Pt with MH resources outside of .    Has patient ever been hospitalized for mental health reasons No   Was the hospitalization voluntary  Duration   Where    When  Describe situation  BP low and not herself, doesn't remember    Discharge Plan for Follow Up  Was Discharge Plan Completed   Referral to Transplant Psych   Yes  Mental Health Follow Up Required      Suicide Assessment:  History of Suicide Ideation No  [] Timeframe [] Frequency   Frequency   Plan Created  Intent to Follow Through  Outcome      History of Suicide Attempt No     History of Suicidal Ideation in the past 3 months No   Intensity   Duration     Description of Plan      Plans thought of  Intent to Follow Through  Highest Level of Intent to Follow Through    Current Plan for Safety    Plan for Follow-Up    Patient's Reported Trauma History   Pt reported a history of trauma.    What " are patient's coping behaviors   Pt reported she enjoys cooking, coloring on her tablet, and spending time with family.    Jainism / Spirituality   Nondenominational    Attitude toward interviewer Cooperative and Appropriate    Eye Contact Patient maintained good eye contact throughout appointment    Appearance The patient was neatly groomed, appropriately dressed and adequately nourished    Affect Appropriate    Thought Process Appropriate    Substance Use /Abuse History:    Current Tobacco User No  Patient uses   Tobacco Frequency   For How Long  Is Patient Required to Quit     Former Tobacco User Yes  Describe past tobacco use and date quit  Pt reported she smoked cigarettes in the past. Pt shared she smoked 1 pack every 2 days for 30 years. Pt reported she stopped smoking cigarettes 2 years ago.     Current Alcohol User No  Type of Alcohol Used   Amount  Frequency   Pattern of Alcohol Use    Is Patient Required to Quit   Continued to use the substance despite being told the substance is affecting their health    History of problems at work, school or home due to substance use      Former Alcohol User Yes  Describe past alcohol use and date quit  Pt reported she drank alcohol socially in the past. Pt shared she would have 2 bottles of beer and a shot in a sitting on holidays/gatherings. Pt reported her last alcohol use was 1 year ago.      Has patient ever gone to CD treatment No  If yes, When, Where and What type of Program  Attends AA meetings    Sponsor  Do support people drink alcohol   If yes, describe support people's use  Is alcohol kept in the home   Does Patient need to sign a CD contract     Current Illegal / Unprescribed Drug User No  Type of Illegal Drug Used   Frequency  Pattern of Drug Use    Is Patient Required to Quit     Illegal / Unprescribed Drug #2  Type of Illegal Drug Used   Frequency  Pattern of Drug Use      Continue to use the substance despite being told the substance is affecting their  "health    History of problems at work, school or home due to substance use      Former Illegal / Unprescribed Drug User Yes  Describe past illegal drug use and date quit  Pt reported she smoked marijuana a couple times in the past. Pt shared her last marijuana use was 15 years ago.     Has patient ever gone to CD treatment No  If yes, When, Where and What type of Program   Attends AA/NA  meetings    Is patient on a Methadone / Suboxone regiment   Do support people use illegal drugs   If yes, describe support people's use  Are illegal drugs kept in the home   Does Patient need to sign a CD contract     Illegal / Unprescribed Drug #2  Type of Illegal Drug Used   Frequency  Pattern of Drug Use    Prescription Drug Abuse:  No Has patient experienced feelings of addiction  No Has patient experienced symptoms of withdrawal  Yes Has patient experienced any side effects? e.g.  hallucinations or delusions    Does Patient Meet the Criteria for Alcohol Use Disorder No Diagnosis  Does Patient Meet [facility] guidelines Yes  Does Patient Meet the Criteria for Illegal Drug Use Disorder No Diagnosis  Does Patient Meet [facility] guidelines Yes    [facility] Substance Relapse Risk Factors   DSM-5 Severity Factors:     IOP     LEGAL ISSUES  No Arrests  No Currently probation or parole No   shelter No  When   How long   Where       Citizenship:  Yes [place] Citizen   Green Card  Visa    Advance Directives: No  HCPOA   SW provided documents.    Guardian:    ERICKA ALLAN met with Pt, Pt's  Behavioral Health Coordinator, Dora, and Pt's daughter, Meche, for initial psychosocial assessment. Pt was pleasant and engaged. Pt reported she has Medicaid [employer] for insurance. Pt showed a limited understanding of the transplant process due to not yet receiving education at time of assessment. Pt expressed financial concerns at this time. Pt reported the cause of her kidney disease is diabetes. Pt reported a goal of transplant is \"to get " "my life back.\" Pt reported good compliance with medications, appointments, and dialysis treatments. Pt listed her daughter, Meche, as primary support and her daughter, Diya, as secondary support. Pt reported both supports are adequate. Pt reported her mood as \"I'm fine.\" Pt shared she has been diagnosed with anxiety. Pt denied any current MH medications and stated she is currently involved with case management through  Behavioral Health department due to frequent ED visits (about 15 in the past year). Pt expressed interest in also engaging in MH counseling. Pt scored a 3 on the PHQ-9, indicating minimal clinical anxiety. Pt scored a 2 on the JONO-7, indicating minimal clinical anxiety. SW referral to transplant psych. Pt denied any current tobacco, alcohol, or illicit drug use. Pt shared she smoked 1 pack every 2 days for 30 years and stopped 2 years ago. Pt reported she drank alcohol socially in the past, having 2 bottles of beer and a shot in a sitting on holidays/gatherings. Pt reported her last alcohol use was 1 year ago. Pt reported she smoked marijuana a couple times in the past and her last use was 15 years ago. Pt scored a 14 on the SIPAT, indicating she is a good candidate for transplant. SW would recommend listing Pt while monitoring Pt's identified risk factors. Pt's risk factors include Pt not receiving transplant education, Pt's financial situation, Pt's frequent ED visits, and Pt's MH.    PLAN  Pt to meet with transplant psych. SW to await recommendations from transplant psych. Pt to complete transplant education process. SW to meet with Pt in 6 months to monitor Pt's financial situation, ED visits, and MH.  "

## 2024-02-06 ENCOUNTER — TELEPHONE (OUTPATIENT)
Dept: TRANSPLANT | Facility: HOSPITAL | Age: 54
End: 2024-02-06

## 2024-02-06 DIAGNOSIS — N18.6 ESRD (END STAGE RENAL DISEASE) (MULTI): Primary | ICD-10-CM

## 2024-02-07 ENCOUNTER — OFFICE VISIT (OUTPATIENT)
Dept: PRIMARY CARE | Facility: CLINIC | Age: 54
End: 2024-02-07
Payer: COMMERCIAL

## 2024-02-07 VITALS
BODY MASS INDEX: 28.58 KG/M2 | HEART RATE: 85 BPM | TEMPERATURE: 96.8 F | OXYGEN SATURATION: 95 % | HEIGHT: 55 IN | SYSTOLIC BLOOD PRESSURE: 195 MMHG | DIASTOLIC BLOOD PRESSURE: 74 MMHG | WEIGHT: 123.5 LBS

## 2024-02-07 DIAGNOSIS — Z94.9 TRANSPLANT: ICD-10-CM

## 2024-02-07 DIAGNOSIS — I50.9 HEART FAILURE, UNSPECIFIED HF CHRONICITY, UNSPECIFIED HEART FAILURE TYPE (MULTI): ICD-10-CM

## 2024-02-07 DIAGNOSIS — R05.2 SUBACUTE COUGH: ICD-10-CM

## 2024-02-07 DIAGNOSIS — R06.02 SOB (SHORTNESS OF BREATH): Primary | ICD-10-CM

## 2024-02-07 DIAGNOSIS — R19.7 DIARRHEA, UNSPECIFIED TYPE: ICD-10-CM

## 2024-02-07 DIAGNOSIS — I10 HYPERTENSION, UNSPECIFIED TYPE: ICD-10-CM

## 2024-02-07 DIAGNOSIS — J02.9 SORE THROAT: ICD-10-CM

## 2024-02-07 PROCEDURE — 1036F TOBACCO NON-USER: CPT

## 2024-02-07 PROCEDURE — RXMED WILLOW AMBULATORY MEDICATION CHARGE

## 2024-02-07 PROCEDURE — 3008F BODY MASS INDEX DOCD: CPT

## 2024-02-07 PROCEDURE — 3077F SYST BP >= 140 MM HG: CPT

## 2024-02-07 PROCEDURE — 99215 OFFICE O/P EST HI 40 MIN: CPT

## 2024-02-07 PROCEDURE — 3048F LDL-C <100 MG/DL: CPT

## 2024-02-07 PROCEDURE — 3044F HG A1C LEVEL LT 7.0%: CPT

## 2024-02-07 PROCEDURE — 3078F DIAST BP <80 MM HG: CPT

## 2024-02-07 RX ORDER — ASPIRIN 81 MG/1
81 TABLET ORAL DAILY
COMMUNITY
Start: 2023-12-07 | End: 2024-04-24 | Stop reason: SDUPTHER

## 2024-02-07 RX ORDER — BENZONATATE 200 MG/1
200 CAPSULE ORAL 3 TIMES DAILY PRN
Qty: 42 CAPSULE | Refills: 0 | Status: SHIPPED | OUTPATIENT
Start: 2024-02-07 | End: 2024-03-08

## 2024-02-07 ASSESSMENT — PATIENT HEALTH QUESTIONNAIRE - PHQ9
5. POOR APPETITE OR OVEREATING: MORE THAN HALF THE DAYS
10. IF YOU CHECKED OFF ANY PROBLEMS, HOW DIFFICULT HAVE THESE PROBLEMS MADE IT FOR YOU TO DO YOUR WORK, TAKE CARE OF THINGS AT HOME, OR GET ALONG WITH OTHER PEOPLE: NOT DIFFICULT AT ALL
SUM OF ALL RESPONSES TO PHQ QUESTIONS 1-9: 3
6. FEELING BAD ABOUT YOURSELF - OR THAT YOU ARE A FAILURE OR HAVE LET YOURSELF OR YOUR FAMILY DOWN: NOT AT ALL
7. TROUBLE CONCENTRATING ON THINGS, SUCH AS READING THE NEWSPAPER OR WATCHING TELEVISION: NOT AT ALL
2. FEELING DOWN, DEPRESSED OR HOPELESS: NOT AT ALL
8. MOVING OR SPEAKING SO SLOWLY THAT OTHER PEOPLE COULD HAVE NOTICED. OR THE OPPOSITE, BEING SO FIGETY OR RESTLESS THAT YOU HAVE BEEN MOVING AROUND A LOT MORE THAN USUAL: NOT AT ALL
3. TROUBLE FALLING OR STAYING ASLEEP OR SLEEPING TOO MUCH: NOT AT ALL
1. LITTLE INTEREST OR PLEASURE IN DOING THINGS: NOT AT ALL
4. FEELING TIRED OR HAVING LITTLE ENERGY: SEVERAL DAYS
SUM OF ALL RESPONSES TO PHQ9 QUESTIONS 1 & 2: 0
9. THOUGHTS THAT YOU WOULD BE BETTER OFF DEAD, OR OF HURTING YOURSELF: NOT AT ALL

## 2024-02-07 ASSESSMENT — ANXIETY QUESTIONNAIRES
2. NOT BEING ABLE TO STOP OR CONTROL WORRYING: NOT AT ALL
GAD7 TOTAL SCORE: 2
6. BECOMING EASILY ANNOYED OR IRRITABLE: NOT AT ALL
IF YOU CHECKED OFF ANY PROBLEMS ON THIS QUESTIONNAIRE, HOW DIFFICULT HAVE THESE PROBLEMS MADE IT FOR YOU TO DO YOUR WORK, TAKE CARE OF THINGS AT HOME, OR GET ALONG WITH OTHER PEOPLE: NOT DIFFICULT AT ALL
5. BEING SO RESTLESS THAT IT IS HARD TO SIT STILL: NOT AT ALL
4. TROUBLE RELAXING: NOT AT ALL
7. FEELING AFRAID AS IF SOMETHING AWFUL MIGHT HAPPEN: NOT AT ALL
3. WORRYING TOO MUCH ABOUT DIFFERENT THINGS: SEVERAL DAYS
1. FEELING NERVOUS, ANXIOUS, OR ON EDGE: SEVERAL DAYS

## 2024-02-07 ASSESSMENT — PAIN SCALES - GENERAL: PAINLEVEL: 0-NO PAIN

## 2024-02-07 NOTE — PATIENT INSTRUCTIONS
Covid / FLU @ [facility] or [facility]  Bring stool studies to the lab  Use advair twice per day, use nebulizer 4-5x/day as needed   Schedule cardiology appointment by calling [phone]

## 2024-02-07 NOTE — PROGRESS NOTES
Stacey Heath  Age: 53 y.o.  MRN: [id number]  Date: 2/5/2024  Location of service: in community    Program Details  Medicaid Community Clinical Case Management  Status: Enrolled  Effective Dates: 10/17/2023 - present  Responsible Staff: PORFIRIO Valderrama      Goals Reviewed:      Problem: Kidney Issues       Goal: Improve health to get on kidney transplant list       Priority: High        Problem: Risk of Uncoordinated Care       Goal: Care will be Coordinated and Supported by a Multidisciplinary Team of Providers       Priority: High          Summary:  Provider met with patient in the community at a social work visit for transplant assessment. Provider sat with patient and her daughter during assessment and advocated for patient during assessment. Provider also helped patient understand questions and provided background on program patient is in during assessment.                  PORFIRIO Valderrama

## 2024-02-07 NOTE — PROGRESS NOTES
Subjective   Patient ID: Stacey Heath is a 53 y.o. female  with a PMH signficant for ESRD (dialysis days) on HD Tu Th Sa (presumably secondary to T2DM and HTN), COPD, asthma, and HFpEF who presents for dyspnea and diarrhea.    HPI    She becomes tearful while describing a cycle of being fluid overloaded, being admitted, and having her medications changed every month. Last month, she was admitted for fluid overload and hypertension. Medications were adjusted and she was sent to dialysis and given oxygen. Using inhalers but doesn't work well, has a breathing machine that she uses once per day. Pt has paroxysmal nocturnal dyspnea and has to sleep sitting up, especially the night prior to dialysis. She tries to make sure she isn't drinking fluids. Can only have 2 cups of water per day. Reports oliguria. Pt explains that dialysis never gets enough fluid off, but that she always becomes hypotensive during treatment, preventing removal of additional fluids.    She reports months of dyspnea that has recently worsened. She reports that walking from the parking lot to the front door, she had to stop and sit down.     Weeks of diarrhea after she eats, 4-5 times daily, watery. No blood, no mucus. No recent changes in diet. Denies pain, cramping. Denies any previous changes.    For a couple days, she reports rhinorrhea, sore throat, nonproductive cough, sneeze, watery eyes. Denies No sick contacts. No recent travel. No pets.     Past Medical History:   Diagnosis Date    Acute pancreatitis 01/20/2024    Acute pyelonephritis due to bacteria 01/16/2023    Acute renal failure (CMS/[facility]) 02/25/2023    YARA (acute kidney injury) (CMS/[facility]) 08/03/2021    Bilateral shoulder pain 02/25/2023    Breast pain 05/18/2021    History of breast pain    Cardiac/pericardial tamponade 01/20/2024    Chronic renal failure syndrome 01/10/2022    Community acquired pneumonia 11/03/2021    Dependence on renal dialysis (CMS/[facility]) 11/21/2022    Hemodialysis  patient    Disorder of sweat glands 02/25/2023    Dry eye syndrome of bilateral lacrimal glands 03/07/2017    Dry eyes    Essential (primary) hypertension 12/27/2022    Hypertension    Flash pulmonary edema (CMS/[facility]) 01/20/2024    Comment on above: FLASH PULMONARY EDEMA J81.0    History of acute pancreatitis 12/21/2020    History of acute pancreatitis    Low grade squamous intraepithelial lesion (LGSIL) on cervicovaginal cytologic smear 02/25/2023    Migraine headache 02/25/2023    Comment on above: MIGRAINES    Migraines     History of migraine    Obstruction of urinary stent (CMS/[facility]) 02/25/2023    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    Other conditions influencing health status 09/01/2021    History of nephrostomy    Pain in upper limb 01/20/2024    Comment on above: ARM PAIN    Periorbital cellulitis 06/13/2014    Formatting of this note might be different from the original. Improved Afebrile No pain on EOM Can see clearly from left eye PLAN PO augmentin as outpatient    Pleural effusion 12/12/2023    Pneumonia 04/11/2023    Sepsis (CMS/[facility]) 01/20/2024    Status migrainosus 08/21/2012    Type 2 myocardial infarction (CMS/[facility]) 01/20/2024    Unspecified diastolic (congestive) heart failure (CMS/[facility]) 11/21/2022    Heart failure with preserved left ventricular function (HFpEF)    Vaginal dryness 07/29/2022    Vaginal dryness     MEDS  Advair once per day, albuterol, nebulizer  Medication Documentation Review Audit       Reviewed by Mo Gutiérrez DO (Resident) on 02/07/24 at 1546      Medication Order Taking? Sig Documenting Provider Last Dose Status   acetaminophen (Tylenol) 325 mg tablet [id number]  Take 3 tablets (975 mg) by mouth every 8 hours if needed for mild pain (1 - 3). Soniya Ruff MD  Active   albuterol (Ventolin HFA) 90 mcg/actuation inhaler [id number] Yes Inhale 2 puffs 2 times a day. Soniya Ruff MD Taking Active   albuterol 1.25 mg/3 mL nebulizer solution [id number] Yes Take 3 mL  (1.25 mg) by nebulization every 6 hours if needed for wheezing. Carroll Medina DO Taking Active   amLODIPine (Norvasc) 10 mg tablet [id number] Yes TAKE 1 TABLET BY MOUTH ONCE DAILY BEFORE DIALYSIS Soniya Ruff MD Taking Active   aspirin 81 mg EC tablet [id number] Yes Take 1 tablet (81 mg) by mouth once daily. Ana Maria Parish MD Taking Active   atorvastatin (Lipitor) 80 mg tablet [id number] Yes Take 1 tablet (80 mg) by mouth once daily at bedtime. Soniya Ruff MD Taking Active   carvedilol (Coreg) 25 mg tablet [id number] Yes Take 1 tablet (25 mg) by mouth 2 times a day. Soniya Ruff MD Taking Active   cloNIDine (Catapres) 0.1 mg tablet [id number] Yes Take 1 tablet (0.1 mg) by mouth 2 times a day. Soniya Ruff MD Taking Active   epoetin joceline (Epogen,Procrit) 10,000 unit/mL injection [id number]  Inject 1 mL (10,000 Units) under the skin 3 times a week. YONIS Oliva  Active   fluticasone (Flonase) 50 mcg/actuation nasal spray [id number]  Administer 2 sprays into each nostril once daily. Shake gently. Before first use, prime pump. After use, clean tip and replace cap. YONIS Oliva  Active   fluticasone propion-salmeteroL (Advair Diskus) 100-50 mcg/dose diskus inhaler [id number]  Inhale 1 puff once daily. Soniya Ruff MD  Active   gabapentin (Neurontin) 300 mg capsule [id number] Yes Take 1 capsule (300 mg) by mouth once daily at bedtime. Soniya Ruff MD Taking Active   hydrALAZINE (Apresoline) 100 mg tablet [id number] Yes Take 1 tablet (100 mg) by mouth every 8 hours. Soniya Ruff MD Taking Active   hydrOXYzine HCL (Atarax) 10 mg tablet [id number]  Take 1 tablet (10 mg) by mouth every 6 hours if needed for itching. Carroll Medina DO  Active   isosorbide mononitrate ER (Imdur) 30 mg 24 hr tablet [id number] Yes Take 1 tablet (30 mg) by mouth once daily. Do not crush or chew. Soniya Ruff MD Taking Active   metoclopramide (Reglan) 5 mg tablet [id number]  TAKE 1  "TABLET BY MOUTH EVERY 6 HOURS AS NEEDED Titus Malcolm MD  Active   multivitamin tablet [id number]  Take 1 tablet by mouth once daily. Soniya Ruff MD  Active   ondansetron (Zofran) 4 mg tablet [id number]  Take 1 tablet (4 mg) by mouth every 8 hours if needed for nausea or vomiting. Soniya Ruff MD  Active   pantoprazole (ProtoNix) 40 mg EC tablet [id number]  Take 1 tablet (40 mg) by mouth once daily. Do not crush, chew, or split. Soniya Ruff MD  Active   sodium chloride (Ocean) 0.65 % nasal spray [id number]  Administer 1 spray into each nostril 4 times a day as needed for congestion. Margarita Meza, APRN-CNP  Active   SUMAtriptan (Imitrex) 50 mg tablet [id number]  Take 1 tablet (50 mg) by mouth 1 time if needed for migraine. Soniya Ruff MD  Active   valsartan (Diovan) 320 mg tablet [id number] Yes Take 1 tablet (320 mg) by mouth once daily. Soniya Ruff MD Taking Active                  SURGERIES  Past Surgical History:   Procedure Laterality Date    APPENDECTOMY  03/07/2017    Appendectomy    CT ABDOMEN ANGIOGRAM W AND/OR WO IV CONTRAST  4/23/2023    CT ABDOMEN ANGIOGRAM W AND/OR WO IV CONTRAST [facility] CT    IR VENOGRAM DIALYSIS  8/5/2021    IR VENOGRAM DIALYSIS 8/5/2021    OTHER SURGICAL HISTORY  03/07/2017    Cystoscopy With Pyeloscopy With Removal Of Calculus    OTHER SURGICAL HISTORY  03/07/2017    Anoscopy For Polyp Removal    OTHER SURGICAL HISTORY  12/08/2021    Arteriovenous fistula creation procedure    OTHER SURGICAL HISTORY  12/08/2021    Dialysis tunneled catheter placement    TUBAL LIGATION  03/07/2017    Tubal Ligation     SOCIAL:   Stays at home. Lives in a house with fiance.  Alcohol: Denies.  Tobacco products: denies  Drug use: Denies  Sexual: One partner, has tubal ligation.    Objective   /80 (BP Location: Left arm, Patient Position: Sitting, BP Cuff Size: Small adult)   Pulse 85   Temp 36 °C (96.8 °F) (Temporal)   Ht 1.397 m (4' 7\")   Wt 56 kg (123 lb 8 oz)   SpO2 95% "   BMI 28.70 kg/m²     12/27/2022 Weight 147 lbs  24 lb loss in 2 years    Physical Exam  Constitutional: NAD, lying recumbent in bed  HEENT: normocephalic, atraumatic, sclerae anicteric, PERRLA EOM grossly intact  CV: regular rate, left jugular vein distension, right nondistended, systolic murmur heart best over left sternal border, S3  Pulmonary: effort normal, CTAB, wheezes  GI: Soft nontender except for LUQ tenderness,   Skin: warm, dry,  Extremities: no edema of the lower extremities  Neuro: alert  Psych: affect appropriate    Assessment/Plan   Stacey Heath is a 53 y.o. female  with a PMH signficant for ESRD (dialysis days) on HD Tu Th Sa (presumably secondary to T2DM and HTN), COPD, asthma, and HFpEF who presents for URI and diarrhea.     Diagnoses and all orders for this visit:  SOB (shortness of breath)  -     Sars-CoV-2 and Influenza A/B PCR; Future  -     Referral to Pulmonology; Future  -     Referral to Cardiology; Future  Diarrhea, unspecified type  -     Sars-CoV-2 and Influenza A/B PCR; Future  -     C. difficile, PCR  -     Stool Pathogen Panel, PCR  Sore throat  -     benzonatate (Tessalon) 200 mg capsule; Take 1 capsule (200 mg) by mouth 3 times a day as needed for cough. Do not crush or chew.  Subacute cough  -     benzonatate (Tessalon) 200 mg capsule; Take 1 capsule (200 mg) by mouth 3 times a day as needed for cough. Do not crush or chew.  Heart failure, unspecified HF chronicity, unspecified heart failure type (CMS/HCC)  -     Referral to Cardiology; Future  Hypertension, unspecified type  -     amLODIPine (Norvasc) 10 mg tablet; TAKE 1 TABLET BY MOUTH ONCE DAILY BEFORE DIALYSIS  Transplant  -     cloNIDine (Catapres) 0.1 mg tablet; Take 1 tablet (0.1 mg) by mouth 2 times a day.    Lucy Salinas MS3    I saw and evaluated the patient. I personally obtained the key and critical portions of the history and physical exam or was physically present for key and critical portions performed by the  student. I reviewed the student's documentation and discussed the patient with the student. I agree with the studen'ts medical decision making as documented in the note.      Stacey Heath is a 53 y.o. female  with a PMH signficant for ESRD (dialysis days) on HD Tu Th Sa (presumably secondary to T2DM and HTN), COPD, asthma, and HFpEF who presents for HTN fu with acute concerns of URI and diarrhea. Pt presenting today in hypertensive urgency which occurs frequently; pt describes a pattern of hospitalizations for fluid overload and consistent readjustment of home BP medications.     #URI  #Diarrhea   #Viral Gastroenteritis  :: timing and associated symptoms consistent with viral gastroenteritis   - pt to visit pharmacy for COVID/Influenza testing   - C. Diff and stool antigen ordered; pt provided with supplies     #HTN  #Hypertensive Urgency  :: pt asymptomatic in the office today; pt frequently in HTN urgency  - med list reviewed and reinforced; deferring changes to BP regimen at this time pending nephrology follow up  - reaching out to pt's nephrologist, Dr. Garcia for interdisciplinary management of pt's BP and fluid status  - pt's fluid status is very delicate; pt becomes hypotensive during dialysis preventing proper removal of fluids, however she becomes dyspneic and fluid overloaded weekly on Monday evenings between Saturday/Tuesday HD sessions    #SOB  #Asthma  #HFpEF  - pulmonology referral for PFTs ordered  - cardiology follow up ordered for interdisciplinary management of HTN, fluid status    RTC in 6-8 weeks for fu of HTN, ESRD or earlier as needed.     Mo Gutiérrez, DO  PGY-1 Family Medicine

## 2024-02-09 ENCOUNTER — DOCUMENTATION (OUTPATIENT)
Dept: BEHAVIORAL HEALTH | Facility: CLINIC | Age: 54
End: 2024-02-09

## 2024-02-09 NOTE — PROGRESS NOTES
Provider called Dominique SHERMAN to try and help patient apply for Medicaid. Provider spoke to someone from [facility] who stated that patient would need to fill out an authorized representative form to be able to speak to provider. Provider received information about where to get form and will follow up with patient on Monday at appointment for form.

## 2024-02-10 ENCOUNTER — PHARMACY VISIT (OUTPATIENT)
Dept: PHARMACY | Facility: CLINIC | Age: 54
End: 2024-02-10
Payer: COMMERCIAL

## 2024-02-10 PROCEDURE — RXMED WILLOW AMBULATORY MEDICATION CHARGE

## 2024-02-12 ENCOUNTER — APPOINTMENT (OUTPATIENT)
Dept: BEHAVIORAL HEALTH | Facility: CLINIC | Age: 54
End: 2024-02-12
Payer: COMMERCIAL

## 2024-02-12 PROCEDURE — RXMED WILLOW AMBULATORY MEDICATION CHARGE

## 2024-02-12 RX ORDER — AMLODIPINE BESYLATE 10 MG/1
TABLET ORAL
Qty: 30 TABLET | Refills: 11 | Status: SHIPPED | OUTPATIENT
Start: 2024-02-12 | End: 2024-04-24 | Stop reason: SDUPTHER

## 2024-02-12 RX ORDER — CLONIDINE HYDROCHLORIDE 0.1 MG/1
0.1 TABLET ORAL 2 TIMES DAILY
Qty: 720 TABLET | Refills: 0 | Status: SHIPPED | OUTPATIENT
Start: 2024-02-12 | End: 2024-04-14 | Stop reason: HOSPADM

## 2024-02-13 ENCOUNTER — DOCUMENTATION (OUTPATIENT)
Dept: BEHAVIORAL HEALTH | Facility: CLINIC | Age: 54
End: 2024-02-13

## 2024-02-13 DIAGNOSIS — F41.9 ANXIETY: ICD-10-CM

## 2024-02-13 PROCEDURE — H2019 THER BEHAV SVC, PER 15 MIN: HCPCS | Mod: AJ,HE | Performed by: SOCIAL WORKER

## 2024-02-13 NOTE — PROGRESS NOTES
Stacey Heath  Age: 53 y.o.  MRN: [id number]  Date: 2/12/2024  Location of service: in community    Program Details  Medicaid Community Clinical Case Management  Status: Enrolled  Effective Dates: 10/17/2023 - present  Responsible Staff: PORFIRIO Valderrama      Goals Reviewed:    Problem: Negative Experience, Conflict with, or Distrust of Providers and/or Health System       Goal: Plan to Address Patient Specific Negative Experience, Distrust, or Conflict with Providers and/or Health System       Priority: High        Problem: Risk of Uncoordinated Care       Goal: Care will be Coordinated and Supported by a Multidisciplinary Team of Providers       Priority: High          Summary:  Provider met with patient in her home. Provider utilized empathic listening to facilitate a discussion with patient about how she is currently doing. Patient reports that she is not feeling well that day and was concerned she might need to go to the ED due to issues breathing. Provider reached out to patient's PCP to provide care coordination for patient. Provider also discussed patient signing an [employer] for [employer] in order for provider and Nurse JORDAN Gallego to help patient get Medicaid benefits. Provider helped patient fill out MENA and answered questions patient had about paper.     Appointment start time: 1110  Appointment completion time: 1210  Total time spent with patient (in minutes): 60      PORFIRIO Valderrama

## 2024-02-14 NOTE — PROGRESS NOTES
Stacey Heath  Age: 53 y.o.  MRN: [id number]  Date: 2/13/2024  Location of service: phone call    Program Details  Medicaid Community Clinical Case Management  Status: Enrolled  Effective Dates: 10/17/2023 - present  Responsible Staff: PORFIRIO Valderrama      Goals Reviewed:      Summary:  This writer called patient to get her scheduled with this writer. We scheduled for Mon 2/19 @ 4pm. Patient also shares that they are going to try to take more fluid off on Saturdays because there has been a trend of her having to go to the hospital on Mondays.    Appointment start time: 1011  Appointment completion time: 1017  Total time spent with patient (in minutes): 6      Roberta Gallego RN

## 2024-02-19 ENCOUNTER — DOCUMENTATION (OUTPATIENT)
Dept: BEHAVIORAL HEALTH | Facility: CLINIC | Age: 54
End: 2024-02-19
Payer: COMMERCIAL

## 2024-02-19 ENCOUNTER — PHARMACY VISIT (OUTPATIENT)
Dept: PHARMACY | Facility: CLINIC | Age: 54
End: 2024-02-19
Payer: MEDICARE

## 2024-02-19 PROCEDURE — H2019 THER BEHAV SVC, PER 15 MIN: HCPCS | Mod: AJ,HE | Performed by: SOCIAL WORKER

## 2024-02-21 ENCOUNTER — DOCUMENTATION (OUTPATIENT)
Dept: BEHAVIORAL HEALTH | Facility: CLINIC | Age: 54
End: 2024-02-21
Payer: COMMERCIAL

## 2024-02-21 ENCOUNTER — TELEPHONE (OUTPATIENT)
Dept: TRANSPLANT | Facility: HOSPITAL | Age: 54
End: 2024-02-21
Payer: COMMERCIAL

## 2024-02-21 DIAGNOSIS — E11.42 DIABETIC POLYNEUROPATHY ASSOCIATED WITH TYPE 2 DIABETES MELLITUS (MULTI): ICD-10-CM

## 2024-02-21 DIAGNOSIS — Z94.9 TRANSPLANT: ICD-10-CM

## 2024-02-21 DIAGNOSIS — E78.5 HYPERLIPIDEMIA, UNSPECIFIED HYPERLIPIDEMIA TYPE: ICD-10-CM

## 2024-02-21 DIAGNOSIS — I10 HYPERTENSION, UNSPECIFIED TYPE: ICD-10-CM

## 2024-02-21 RX ORDER — PANTOPRAZOLE SODIUM 40 MG/1
40 TABLET, DELAYED RELEASE ORAL DAILY
Qty: 30 TABLET | Refills: 0 | OUTPATIENT
Start: 2024-02-21 | End: 2024-03-22

## 2024-02-21 RX ORDER — ATORVASTATIN CALCIUM 80 MG/1
80 TABLET, FILM COATED ORAL NIGHTLY
Qty: 30 TABLET | Refills: 0 | OUTPATIENT
Start: 2024-02-21 | End: 2024-03-22

## 2024-02-21 RX ORDER — GABAPENTIN 300 MG/1
300 CAPSULE ORAL NIGHTLY
Qty: 30 CAPSULE | Refills: 0 | OUTPATIENT
Start: 2024-02-21 | End: 2024-03-22

## 2024-02-21 RX ORDER — CARVEDILOL 25 MG/1
25 TABLET ORAL 2 TIMES DAILY
Qty: 60 TABLET | Refills: 0 | OUTPATIENT
Start: 2024-02-21 | End: 2024-03-22

## 2024-02-21 RX ORDER — VALSARTAN 320 MG/1
320 TABLET ORAL DAILY
Qty: 30 TABLET | Refills: 0 | OUTPATIENT
Start: 2024-02-21 | End: 2024-03-22

## 2024-02-21 RX ORDER — ISOSORBIDE MONONITRATE 30 MG/1
30 TABLET, EXTENDED RELEASE ORAL DAILY
Qty: 30 TABLET | Refills: 0 | OUTPATIENT
Start: 2024-02-21 | End: 2024-03-22

## 2024-02-21 RX ORDER — HYDRALAZINE HYDROCHLORIDE 100 MG/1
100 TABLET, FILM COATED ORAL EVERY 8 HOURS
Qty: 90 TABLET | Refills: 0 | OUTPATIENT
Start: 2024-02-21 | End: 2024-03-22

## 2024-02-21 NOTE — PROGRESS NOTES
Stacey Heath  Age: 53 y.o.  MRN: [id number]  Date: 2/21/2024  Location of service: phone call    Program Details  Medicaid Community Clinical Case Management  Status: Enrolled  Effective Dates: 10/17/2023 - present  Responsible Staff: PORFIRIO Valderrama      Goals Reviewed:  Problem: Kidney Issues       Goal: Improve health to get on kidney transplant list       Priority: High        Problem: Risk of Uncoordinated Care       Goal: Care will be Coordinated and Supported by a Multidisciplinary Team of Providers       Priority: High          Summary:  This writer called patient at 1430 to schedule an appointment with her. We scheduled an appointment for Friday 3/1/24 at 1130. Patient voices concerns about not getting a phone call yet about scheduling her first appointment with the transplant team. This writer sees note from Mickie Monge regarding patient's online education. Patient states she completed the education. This writer messages Mickie Monge via [employer] in regards to how patient would get scheduled for her appointment with the transplant team.  This writer called patient again at 1445 after receiving the phone number for her to get scheduled with the transplant team. This writer relays the phone number to the patient. Patient then states that her nephrologist at her dialysis center was supposed to order her a diuretic but states he forgot to put in the order. Patient requests this writer reach out to her PCP about prescribing a diuretic. This writer messages patient's PCP via [employer] regarding the situation.  This writer called patient at 1520 to let her know that her PCP would be looking into her situation surrounding the diuretic when he is able to. Patient states she was able to get the transplant appointment scheduled.    Appointment start time: 1520  Appointment completion time: 1535  Total time spent with patient (in minutes): 15      Roberta Gallego RN

## 2024-02-22 NOTE — PROGRESS NOTES
I saw and evaluated the patient. I personally obtained the key and critical portions of the history and physical exam or was physically present for key and critical portions performed by the resident/fellow. I reviewed the resident/fellow's documentation and discussed the patient with the resident/fellow. I agree with the resident/fellow's medical decision making as documented in the note.    Deb Grajeda MD

## 2024-02-23 NOTE — PROGRESS NOTES
"Stacey Heath  Age: 53 y.o.  MRN: [id number]  Date: 2/19/2024  Location of service: in community    Program Details  Medicaid Community Clinical Case Management  Status: Enrolled  Effective Dates: 10/17/2023 - present  Responsible Staff: PORFIRIO Valderrama      Goals Reviewed:    Problem: Risk of Uncoordinated Care       Goal: Care will be Coordinated and Supported by a Multidisciplinary Team of Providers       Priority: High          Summary:  Provider and patient met at patient's home. Provider utilized empathic listening to facilitate a discussion with patient about how she is currently doing. Patient reports that she is doing \"good\" although she is having some issues with getting appointments scheduled. Provider did care coordination with patient and helped patient get scheduled for multiple follow up appointments with different providers.     Appointment start time: 1300  Appointment completion time: 1400  Total time spent with patient (in minutes): 60      PORFIRIO Valderrama   "

## 2024-02-26 ENCOUNTER — TELEPHONE (OUTPATIENT)
Dept: PULMONOLOGY | Facility: HOSPITAL | Age: 54
End: 2024-02-26
Payer: COMMERCIAL

## 2024-02-28 ENCOUNTER — CLINICAL SUPPORT (OUTPATIENT)
Dept: EMERGENCY MEDICINE | Facility: HOSPITAL | Age: 54
DRG: 640 | End: 2024-02-28
Payer: COMMERCIAL

## 2024-02-28 ENCOUNTER — DOCUMENTATION (OUTPATIENT)
Dept: BEHAVIORAL HEALTH | Facility: CLINIC | Age: 54
End: 2024-02-28
Payer: COMMERCIAL

## 2024-02-28 ENCOUNTER — APPOINTMENT (OUTPATIENT)
Dept: RADIOLOGY | Facility: HOSPITAL | Age: 54
DRG: 640 | End: 2024-02-28
Payer: COMMERCIAL

## 2024-02-28 ENCOUNTER — APPOINTMENT (OUTPATIENT)
Dept: PULMONOLOGY | Facility: HOSPITAL | Age: 54
End: 2024-02-28
Payer: COMMERCIAL

## 2024-02-28 ENCOUNTER — HOSPITAL ENCOUNTER (INPATIENT)
Facility: HOSPITAL | Age: 54
LOS: 4 days | Discharge: HOME | DRG: 640 | End: 2024-03-03
Attending: EMERGENCY MEDICINE | Admitting: NURSE PRACTITIONER
Payer: COMMERCIAL

## 2024-02-28 ENCOUNTER — DOCUMENTATION (OUTPATIENT)
Dept: BEHAVIORAL HEALTH | Facility: CLINIC | Age: 54
End: 2024-02-28

## 2024-02-28 DIAGNOSIS — R09.02 HYPOXIA: ICD-10-CM

## 2024-02-28 DIAGNOSIS — E87.70 HYPERVOLEMIA: Primary | ICD-10-CM

## 2024-02-28 DIAGNOSIS — F41.9 ANXIETY: ICD-10-CM

## 2024-02-28 DIAGNOSIS — U07.1 COVID: ICD-10-CM

## 2024-02-28 LAB
ALBUMIN SERPL BCP-MCNC: 4.2 G/DL (ref 3.4–5)
ALP SERPL-CCNC: 114 U/L (ref 33–110)
ALT SERPL W P-5'-P-CCNC: 14 U/L (ref 7–45)
ANION GAP SERPL CALC-SCNC: 20 MMOL/L (ref 10–20)
AST SERPL W P-5'-P-CCNC: 12 U/L (ref 9–39)
ATRIAL RATE: 112 BPM
BASOPHILS # BLD AUTO: 0.05 X10*3/UL (ref 0–0.1)
BASOPHILS NFR BLD AUTO: 0.7 %
BILIRUB SERPL-MCNC: 0.6 MG/DL (ref 0–1.2)
BNP SERPL-MCNC: 2016 PG/ML (ref 0–99)
BUN SERPL-MCNC: 32 MG/DL (ref 6–23)
CALCIUM SERPL-MCNC: 9.9 MG/DL (ref 8.6–10.6)
CARDIAC TROPONIN I PNL SERPL HS: 116 NG/L (ref 0–34)
CARDIAC TROPONIN I PNL SERPL HS: 193 NG/L (ref 0–34)
CHLORIDE SERPL-SCNC: 100 MMOL/L (ref 98–107)
CO2 SERPL-SCNC: 28 MMOL/L (ref 21–32)
CREAT SERPL-MCNC: 7.8 MG/DL (ref 0.5–1.05)
EGFRCR SERPLBLD CKD-EPI 2021: 6 ML/MIN/1.73M*2
EOSINOPHIL # BLD AUTO: 0.31 X10*3/UL (ref 0–0.7)
EOSINOPHIL NFR BLD AUTO: 4.3 %
ERYTHROCYTE [DISTWIDTH] IN BLOOD BY AUTOMATED COUNT: 15.9 % (ref 11.5–14.5)
FLUAV RNA RESP QL NAA+PROBE: NOT DETECTED
FLUBV RNA RESP QL NAA+PROBE: NOT DETECTED
GLUCOSE SERPL-MCNC: 80 MG/DL (ref 74–99)
HCT VFR BLD AUTO: 38.3 % (ref 36–46)
HGB BLD-MCNC: 12.3 G/DL (ref 12–16)
IMM GRANULOCYTES # BLD AUTO: 0.03 X10*3/UL (ref 0–0.7)
IMM GRANULOCYTES NFR BLD AUTO: 0.4 % (ref 0–0.9)
LYMPHOCYTES # BLD AUTO: 0.65 X10*3/UL (ref 1.2–4.8)
LYMPHOCYTES NFR BLD AUTO: 9.1 %
MCH RBC QN AUTO: 29.1 PG (ref 26–34)
MCHC RBC AUTO-ENTMCNC: 32.1 G/DL (ref 32–36)
MCV RBC AUTO: 91 FL (ref 80–100)
MONOCYTES # BLD AUTO: 0.41 X10*3/UL (ref 0.1–1)
MONOCYTES NFR BLD AUTO: 5.7 %
NEUTROPHILS # BLD AUTO: 5.71 X10*3/UL (ref 1.2–7.7)
NEUTROPHILS NFR BLD AUTO: 79.8 %
NRBC BLD-RTO: 0 /100 WBCS (ref 0–0)
P AXIS: 53 DEGREES
P OFFSET: 202 MS
P ONSET: 153 MS
PLATELET # BLD AUTO: 185 X10*3/UL (ref 150–450)
POTASSIUM SERPL-SCNC: 3.5 MMOL/L (ref 3.5–5.3)
PR INTERVAL: 140 MS
PROT SERPL-MCNC: 7.6 G/DL (ref 6.4–8.2)
Q ONSET: 223 MS
QRS COUNT: 19 BEATS
QRS DURATION: 78 MS
QT INTERVAL: 330 MS
QTC CALCULATION(BAZETT): 450 MS
QTC FREDERICIA: 406 MS
R AXIS: 21 DEGREES
RBC # BLD AUTO: 4.22 X10*6/UL (ref 4–5.2)
SARS-COV-2 RNA RESP QL NAA+PROBE: DETECTED
SODIUM SERPL-SCNC: 144 MMOL/L (ref 136–145)
T AXIS: 166 DEGREES
T OFFSET: 388 MS
VENTRICULAR RATE: 112 BPM
WBC # BLD AUTO: 7.2 X10*3/UL (ref 4.4–11.3)

## 2024-02-28 PROCEDURE — 2500000002 HC RX 250 W HCPCS SELF ADMINISTERED DRUGS (ALT 637 FOR MEDICARE OP, ALT 636 FOR OP/ED): Performed by: PHYSICIAN ASSISTANT

## 2024-02-28 PROCEDURE — 83880 ASSAY OF NATRIURETIC PEPTIDE: CPT | Performed by: PHYSICIAN ASSISTANT

## 2024-02-28 PROCEDURE — 2500000001 HC RX 250 WO HCPCS SELF ADMINISTERED DRUGS (ALT 637 FOR MEDICARE OP): Performed by: PHYSICIAN ASSISTANT

## 2024-02-28 PROCEDURE — 84100 ASSAY OF PHOSPHORUS: CPT | Performed by: INTERNAL MEDICINE

## 2024-02-28 PROCEDURE — 1200000002 HC GENERAL ROOM WITH TELEMETRY DAILY

## 2024-02-28 PROCEDURE — H0036 COMM PSY FACE-FACE PER 15MIN: HCPCS | Mod: AJ,HE,GT,HQ | Performed by: SOCIAL WORKER

## 2024-02-28 PROCEDURE — 99285 EMERGENCY DEPT VISIT HI MDM: CPT | Mod: 25

## 2024-02-28 PROCEDURE — 99285 EMERGENCY DEPT VISIT HI MDM: CPT | Performed by: PHYSICIAN ASSISTANT

## 2024-02-28 PROCEDURE — 93005 ELECTROCARDIOGRAM TRACING: CPT

## 2024-02-28 PROCEDURE — 87636 SARSCOV2 & INF A&B AMP PRB: CPT | Performed by: PHYSICIAN ASSISTANT

## 2024-02-28 PROCEDURE — 36415 COLL VENOUS BLD VENIPUNCTURE: CPT | Performed by: PHYSICIAN ASSISTANT

## 2024-02-28 PROCEDURE — 2500000002 HC RX 250 W HCPCS SELF ADMINISTERED DRUGS (ALT 637 FOR MEDICARE OP, ALT 636 FOR OP/ED): Performed by: NURSE PRACTITIONER

## 2024-02-28 PROCEDURE — 94660 CPAP INITIATION&MGMT: CPT

## 2024-02-28 PROCEDURE — 84484 ASSAY OF TROPONIN QUANT: CPT | Performed by: EMERGENCY MEDICINE

## 2024-02-28 PROCEDURE — 2500000004 HC RX 250 GENERAL PHARMACY W/ HCPCS (ALT 636 FOR OP/ED): Performed by: PHYSICIAN ASSISTANT

## 2024-02-28 PROCEDURE — 71045 X-RAY EXAM CHEST 1 VIEW: CPT | Mod: FOREIGN READ | Performed by: RADIOLOGY

## 2024-02-28 PROCEDURE — 36415 COLL VENOUS BLD VENIPUNCTURE: CPT | Performed by: EMERGENCY MEDICINE

## 2024-02-28 PROCEDURE — 80053 COMPREHEN METABOLIC PANEL: CPT | Performed by: PHYSICIAN ASSISTANT

## 2024-02-28 PROCEDURE — 2500000001 HC RX 250 WO HCPCS SELF ADMINISTERED DRUGS (ALT 637 FOR MEDICARE OP): Performed by: NURSE PRACTITIONER

## 2024-02-28 PROCEDURE — 84484 ASSAY OF TROPONIN QUANT: CPT | Performed by: PHYSICIAN ASSISTANT

## 2024-02-28 PROCEDURE — 99222 1ST HOSP IP/OBS MODERATE 55: CPT | Performed by: NURSE PRACTITIONER

## 2024-02-28 PROCEDURE — 96374 THER/PROPH/DIAG INJ IV PUSH: CPT

## 2024-02-28 PROCEDURE — 71045 X-RAY EXAM CHEST 1 VIEW: CPT

## 2024-02-28 PROCEDURE — 93010 ELECTROCARDIOGRAM REPORT: CPT | Performed by: PHYSICIAN ASSISTANT

## 2024-02-28 PROCEDURE — 85025 COMPLETE CBC W/AUTO DIFF WBC: CPT | Performed by: PHYSICIAN ASSISTANT

## 2024-02-28 RX ORDER — VALSARTAN 320 MG/1
320 TABLET ORAL DAILY
Status: DISCONTINUED | OUTPATIENT
Start: 2024-02-29 | End: 2024-03-03 | Stop reason: HOSPADM

## 2024-02-28 RX ORDER — CLONIDINE HYDROCHLORIDE 0.1 MG/1
0.1 TABLET ORAL ONCE
Status: COMPLETED | OUTPATIENT
Start: 2024-02-28 | End: 2024-02-28

## 2024-02-28 RX ORDER — ALBUTEROL SULFATE 0.83 MG/ML
2.5 SOLUTION RESPIRATORY (INHALATION) ONCE
Status: COMPLETED | OUTPATIENT
Start: 2024-02-28 | End: 2024-02-28

## 2024-02-28 RX ORDER — BENZONATATE 100 MG/1
200 CAPSULE ORAL 3 TIMES DAILY PRN
Status: DISCONTINUED | OUTPATIENT
Start: 2024-02-28 | End: 2024-03-03 | Stop reason: HOSPADM

## 2024-02-28 RX ORDER — LABETALOL HYDROCHLORIDE 5 MG/ML
20 INJECTION, SOLUTION INTRAVENOUS ONCE
Status: COMPLETED | OUTPATIENT
Start: 2024-02-28 | End: 2024-02-28

## 2024-02-28 RX ORDER — AMLODIPINE BESYLATE 10 MG/1
10 TABLET ORAL DAILY
Status: DISCONTINUED | OUTPATIENT
Start: 2024-02-29 | End: 2024-03-03 | Stop reason: HOSPADM

## 2024-02-28 RX ORDER — FLUTICASONE FUROATE AND VILANTEROL 100; 25 UG/1; UG/1
1 POWDER RESPIRATORY (INHALATION)
Status: DISCONTINUED | OUTPATIENT
Start: 2024-02-29 | End: 2024-03-03 | Stop reason: HOSPADM

## 2024-02-28 RX ORDER — HYDRALAZINE HYDROCHLORIDE 25 MG/1
TABLET, FILM COATED ORAL
Status: DISPENSED
Start: 2024-02-28 | End: 2024-02-29

## 2024-02-28 RX ORDER — ALBUTEROL SULFATE 90 UG/1
2 AEROSOL, METERED RESPIRATORY (INHALATION) 2 TIMES DAILY
Status: DISCONTINUED | OUTPATIENT
Start: 2024-02-28 | End: 2024-03-03 | Stop reason: HOSPADM

## 2024-02-28 RX ORDER — CLONIDINE HYDROCHLORIDE 0.1 MG/1
0.1 TABLET ORAL 2 TIMES DAILY
Status: DISCONTINUED | OUTPATIENT
Start: 2024-02-28 | End: 2024-03-03 | Stop reason: HOSPADM

## 2024-02-28 RX ORDER — ACETAMINOPHEN 160 MG/5ML
650 SOLUTION ORAL EVERY 4 HOURS PRN
Status: DISCONTINUED | OUTPATIENT
Start: 2024-02-28 | End: 2024-03-03 | Stop reason: HOSPADM

## 2024-02-28 RX ORDER — ASPIRIN 81 MG/1
81 TABLET ORAL DAILY
Status: DISCONTINUED | OUTPATIENT
Start: 2024-02-29 | End: 2024-03-03 | Stop reason: HOSPADM

## 2024-02-28 RX ORDER — PANTOPRAZOLE SODIUM 40 MG/1
40 TABLET, DELAYED RELEASE ORAL DAILY
Status: DISCONTINUED | OUTPATIENT
Start: 2024-02-29 | End: 2024-03-03 | Stop reason: HOSPADM

## 2024-02-28 RX ORDER — ISOSORBIDE MONONITRATE 30 MG/1
30 TABLET, EXTENDED RELEASE ORAL DAILY
Status: DISCONTINUED | OUTPATIENT
Start: 2024-02-29 | End: 2024-03-03 | Stop reason: HOSPADM

## 2024-02-28 RX ORDER — FLUTICASONE PROPIONATE 50 MCG
2 SPRAY, SUSPENSION (ML) NASAL DAILY
Status: DISCONTINUED | OUTPATIENT
Start: 2024-02-29 | End: 2024-03-03 | Stop reason: HOSPADM

## 2024-02-28 RX ORDER — HYDRALAZINE HYDROCHLORIDE 20 MG/ML
5 INJECTION INTRAMUSCULAR; INTRAVENOUS EVERY 6 HOURS PRN
Status: DISCONTINUED | OUTPATIENT
Start: 2024-02-28 | End: 2024-03-03 | Stop reason: HOSPADM

## 2024-02-28 RX ORDER — POLYETHYLENE GLYCOL 3350 17 G/17G
17 POWDER, FOR SOLUTION ORAL DAILY PRN
Status: DISCONTINUED | OUTPATIENT
Start: 2024-02-28 | End: 2024-03-03 | Stop reason: HOSPADM

## 2024-02-28 RX ORDER — ACETAMINOPHEN 500 MG
10 TABLET ORAL DAILY PRN
Status: DISCONTINUED | OUTPATIENT
Start: 2024-02-28 | End: 2024-03-03 | Stop reason: HOSPADM

## 2024-02-28 RX ORDER — NITROGLYCERIN 0.4 MG/1
0.4 TABLET SUBLINGUAL ONCE
Status: COMPLETED | OUTPATIENT
Start: 2024-02-28 | End: 2024-02-28

## 2024-02-28 RX ORDER — HYDRALAZINE HYDROCHLORIDE 25 MG/1
100 TABLET, FILM COATED ORAL ONCE
Status: COMPLETED | OUTPATIENT
Start: 2024-02-28 | End: 2024-02-28

## 2024-02-28 RX ORDER — ACETAMINOPHEN 325 MG/1
975 TABLET ORAL ONCE
Status: COMPLETED | OUTPATIENT
Start: 2024-02-28 | End: 2024-02-28

## 2024-02-28 RX ORDER — ALBUTEROL SULFATE 90 UG/1
2 AEROSOL, METERED RESPIRATORY (INHALATION) EVERY 6 HOURS PRN
Status: DISCONTINUED | OUTPATIENT
Start: 2024-02-28 | End: 2024-03-03 | Stop reason: HOSPADM

## 2024-02-28 RX ORDER — ACETAMINOPHEN 650 MG/1
650 SUPPOSITORY RECTAL EVERY 4 HOURS PRN
Status: DISCONTINUED | OUTPATIENT
Start: 2024-02-28 | End: 2024-03-03 | Stop reason: HOSPADM

## 2024-02-28 RX ORDER — CARVEDILOL 12.5 MG/1
25 TABLET ORAL 2 TIMES DAILY
Status: DISCONTINUED | OUTPATIENT
Start: 2024-02-29 | End: 2024-03-03 | Stop reason: HOSPADM

## 2024-02-28 RX ORDER — CARVEDILOL 12.5 MG/1
25 TABLET ORAL ONCE
Status: COMPLETED | OUTPATIENT
Start: 2024-02-28 | End: 2024-02-28

## 2024-02-28 RX ORDER — HYDROXYZINE HYDROCHLORIDE 10 MG/1
10 TABLET, FILM COATED ORAL EVERY 6 HOURS PRN
Status: DISCONTINUED | OUTPATIENT
Start: 2024-02-28 | End: 2024-03-03 | Stop reason: HOSPADM

## 2024-02-28 RX ORDER — GABAPENTIN 300 MG/1
300 CAPSULE ORAL NIGHTLY
Status: DISCONTINUED | OUTPATIENT
Start: 2024-02-28 | End: 2024-03-03 | Stop reason: HOSPADM

## 2024-02-28 RX ORDER — NICARDIPINE HYDROCHLORIDE 0.2 MG/ML
2.5-15 INJECTION INTRAVENOUS CONTINUOUS
Status: DISCONTINUED | OUTPATIENT
Start: 2024-02-28 | End: 2024-02-28

## 2024-02-28 RX ORDER — HYDRALAZINE HYDROCHLORIDE 50 MG/1
100 TABLET, FILM COATED ORAL EVERY 8 HOURS
Status: DISCONTINUED | OUTPATIENT
Start: 2024-02-28 | End: 2024-03-03 | Stop reason: HOSPADM

## 2024-02-28 RX ORDER — ACETAMINOPHEN 325 MG/1
650 TABLET ORAL EVERY 4 HOURS PRN
Status: DISCONTINUED | OUTPATIENT
Start: 2024-02-28 | End: 2024-03-03 | Stop reason: HOSPADM

## 2024-02-28 RX ORDER — HYDRALAZINE HYDROCHLORIDE 20 MG/ML
10 INJECTION INTRAMUSCULAR; INTRAVENOUS ONCE
Status: COMPLETED | OUTPATIENT
Start: 2024-02-28 | End: 2024-02-28

## 2024-02-28 RX ADMIN — HYDRALAZINE HYDROCHLORIDE 10 MG: 20 INJECTION INTRAMUSCULAR; INTRAVENOUS at 15:55

## 2024-02-28 RX ADMIN — LABETALOL HYDROCHLORIDE 20 MG: 5 INJECTION, SOLUTION INTRAVENOUS at 17:37

## 2024-02-28 RX ADMIN — GABAPENTIN 300 MG: 300 CAPSULE ORAL at 22:20

## 2024-02-28 RX ADMIN — CLONIDINE HYDROCHLORIDE 0.1 MG: 0.1 TABLET ORAL at 18:35

## 2024-02-28 RX ADMIN — CARVEDILOL 25 MG: 12.5 TABLET, FILM COATED ORAL at 18:35

## 2024-02-28 RX ADMIN — NITROGLYCERIN 0.4 MG: 0.4 TABLET SUBLINGUAL at 16:04

## 2024-02-28 RX ADMIN — ALBUTEROL SULFATE 2 PUFF: 90 AEROSOL, METERED RESPIRATORY (INHALATION) at 22:20

## 2024-02-28 RX ADMIN — ACETAMINOPHEN 975 MG: 325 TABLET ORAL at 18:35

## 2024-02-28 RX ADMIN — HYDRALAZINE HYDROCHLORIDE 100 MG: 25 TABLET, FILM COATED ORAL at 18:35

## 2024-02-28 RX ADMIN — ALBUTEROL SULFATE 2.5 MG: 2.5 SOLUTION RESPIRATORY (INHALATION) at 17:37

## 2024-02-28 ASSESSMENT — ENCOUNTER SYMPTOMS
PALPITATIONS: 0
DIARRHEA: 0
VOMITING: 0
ABDOMINAL PAIN: 0
NAUSEA: 0
FLANK PAIN: 0
HEMATURIA: 0
FREQUENCY: 0
FEVER: 0
COUGH: 1
DYSURIA: 0
CONSTIPATION: 0
SHORTNESS OF BREATH: 1
CHILLS: 0

## 2024-02-28 ASSESSMENT — PAIN DESCRIPTION - PROGRESSION: CLINICAL_PROGRESSION: NOT CHANGED

## 2024-02-28 ASSESSMENT — PAIN - FUNCTIONAL ASSESSMENT: PAIN_FUNCTIONAL_ASSESSMENT: 0-10

## 2024-02-28 ASSESSMENT — PAIN SCALES - GENERAL: PAINLEVEL_OUTOF10: 0 - NO PAIN

## 2024-02-28 NOTE — PROGRESS NOTES
"Stacey Heath  Age: 53 y.o.  MRN: [id number]  Date: 2/28/2024  Location of service: phone call    Program Details  Medicaid Community Clinical Case Management  Status: Enrolled  Effective Dates: 10/17/2023 - present  Responsible Staff: PORFIRIO Valderrama      Goals Reviewed:    Problem: Risk of Uncoordinated Care       Goal: Care will be Coordinated and Supported by a Multidisciplinary Team of Providers       Priority: High          Summary:  Provider called patient on the telephone. Provider utilized empathic listening to facilitate a discussion with patient about how she is currently doing. Patient reports not feeling well- she is having \"fluid overload' and trouble breathing and is concerned she might have to go to ED. Provider encouraged patient to go to ED if she started feeling worse. Patient reports that she did not have a \"good day\" at her dialysis center the previous day due to some health concerns. Provider did care coordination with patient's PCP about how patient is feeling to see if PCP would be able to help patient. Provider also reminded patient of upcoming appointment with Nurse JORDAN Gallego and scheduled with patient for the following week with this provider.     Appointment start time: 1117  Appointment completion time: 1137  Total time spent with patient (in minutes): 20      PORFIRIO Valderrama   "

## 2024-02-28 NOTE — PROGRESS NOTES
Stacey Heath  Age: 53 y.o.  MRN: [id number]  Date: 2/28/2024  Location of service: phone call    Program Details  Medicaid Community Clinical Case Management  Status: Enrolled  Effective Dates: 10/17/2023 - present  Responsible Staff: PORFIRIO Valderrama      Goals Reviewed:      Summary:  This writer called patient at 1505 to check on her after hearing from Dora MONROY that patient was not doing well this morning. Patient was unable to answer at this time. This writer left a voicemail.    Appointment start time: 1505  Appointment completion time: 1506  Total time spent with patient (in minutes): 1      Roberta Gallego RN

## 2024-02-28 NOTE — ED PROVIDER NOTES
"Emergency Department Encounter  [facility] (formerly [facility]) EMERGENCY MEDICINE    Patient: Stacey Heath  MRN: [id number]  : 1970  Date of Evaluation: 2024      Chief Complaint     No chief complaint on file.    HPI    Stacey Heath is a 53 y.o. female who presents to the emergency department presenting for chest pain and shortness of breath that started yesterday.  Patient has a history of end-stage renal disease on hemodialysis Tuesday, Thursday and Saturday via right upper extremity fistula.  Patient states that she completed her 3-1/2-hour session yesterday at her regularly scheduled appointment, however notes that she was told she was hypotensive during this session and therefore no fluid was actually taken off, \"my blood was only cleaned\".  Patient states that typically when she feels this way it is because she is fluid overloaded.  Endorsing a nonproductive cough since yesterday as well.  In triage the patient was placed on 5 L nasal cannula due to hypoxia.  The patient states that the only time she ever has required oxygen is in the past as when she was fluid overloaded. Is having some mid-sternal chest pain that does not radiate. Feels short of breath at rest and with exertion. No fevers, chills. Endorses feels nauseated without vomiting or diarrhea. No BM in several days, which she reports isn't unusual for her.    ROS:     Review of Systems  14 systems reviewed and otherwise acutely negative except as in the HPI.    Past History     Past Medical History:   Diagnosis Date    Acute pancreatitis 2024    Acute pyelonephritis due to bacteria 2023    Acute renal failure (CMS/[facility]) 2023    YARA (acute kidney injury) (CMS/[facility]) 2021    Bilateral shoulder pain 2023    Breast pain 2021    History of breast pain    Cardiac/pericardial tamponade 2024    Chronic renal failure syndrome 01/10/2022    Community acquired pneumonia 2021    Dependence on renal " dialysis (CMS/[facility]) 11/21/2022    Hemodialysis patient    Disorder of sweat glands 02/25/2023    Dry eye syndrome of bilateral lacrimal glands 03/07/2017    Dry eyes    Essential (primary) hypertension 12/27/2022    Hypertension    Flash pulmonary edema (CMS/[facility]) 01/20/2024    Comment on above: FLASH PULMONARY EDEMA J81.0    History of acute pancreatitis 12/21/2020    History of acute pancreatitis    Low grade squamous intraepithelial lesion (LGSIL) on cervicovaginal cytologic smear 02/25/2023    Migraine headache 02/25/2023    Comment on above: MIGRAINES    Migraines     History of migraine    Obstruction of urinary stent (CMS/[facility]) 02/25/2023    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    Other conditions influencing health status 09/01/2021    History of nephrostomy    Pain in upper limb 01/20/2024    Comment on above: ARM PAIN    Periorbital cellulitis 06/13/2014    Formatting of this note might be different from the original. Improved Afebrile No pain on EOM Can see clearly from left eye PLAN PO augmentin as outpatient    Pleural effusion 12/12/2023    Pneumonia 04/11/2023    Sepsis (CMS/[facility]) 01/20/2024    Status migrainosus 08/21/2012    Type 2 myocardial infarction (CMS/[facility]) 01/20/2024    Unspecified diastolic (congestive) heart failure (CMS/[facility]) 11/21/2022    Heart failure with preserved left ventricular function (HFpEF)    Vaginal dryness 07/29/2022    Vaginal dryness     Past Surgical History:   Procedure Laterality Date    APPENDECTOMY  03/07/2017    Appendectomy    CT ABDOMEN ANGIOGRAM W AND/OR WO IV CONTRAST  4/23/2023    CT ABDOMEN ANGIOGRAM W AND/OR WO IV CONTRAST [facility] CT    IR VENOGRAM DIALYSIS  8/5/2021    IR VENOGRAM DIALYSIS 8/5/2021    OTHER SURGICAL HISTORY  03/07/2017    Cystoscopy With Pyeloscopy With Removal Of Calculus    OTHER SURGICAL HISTORY  03/07/2017    Anoscopy For Polyp Removal    OTHER SURGICAL HISTORY  12/08/2021    Arteriovenous fistula creation procedure    OTHER SURGICAL  HISTORY  12/08/2021    Dialysis tunneled catheter placement    TUBAL LIGATION  03/07/2017    Tubal Ligation     Social History     Socioeconomic History    Marital status: Single     Spouse name: Not on file    Number of children: Not on file    Years of education: Not on file    Highest education level: Not on file   Occupational History    Not on file   Tobacco Use    Smoking status: Never    Smokeless tobacco: Never   Vaping Use    Vaping Use: Never used   Substance and Sexual Activity    Alcohol use: Never    Drug use: Never    Sexual activity: Not on file   Other Topics Concern    Not on file   Social History Narrative    Not on file     Social Determinants of Health     Financial Resource Strain: Low Risk  (1/17/2024)    Overall Financial Resource Strain (CARDIA)     Difficulty of Paying Living Expenses: Not very hard   Food Insecurity: No Food Insecurity (10/17/2023)    Hunger Vital Sign     Worried About Running Out of Food in the Last Year: Never true     Ran Out of Food in the Last Year: Never true   Transportation Needs: No Transportation Needs (1/17/2024)    PRAPARE - Transportation     Lack of Transportation (Medical): No     Lack of Transportation (Non-Medical): No   Physical Activity: Insufficiently Active (10/17/2023)    Exercise Vital Sign     Days of Exercise per Week: 3 days     Minutes of Exercise per Session: 10 min   Stress: No Stress Concern Present (10/17/2023)    [employer] [place] [employer] - Occupational Stress Questionnaire     Feeling of Stress : Only a little   Social Connections: Moderately Integrated (10/17/2023)    Social Connection and Isolation Panel [NHANES]     Frequency of Communication with Friends and Family: More than three times a week     Frequency of Social Gatherings with Friends and Family: Once a week     Attends Judaism Services: More than 4 times per year     Active Member of Clubs or Organizations: No     Attends Club or Organization Meetings: Not on  file     Marital Status: Living with partner   Intimate Partner Violence: Not At Risk (10/17/2023)    Humiliation, Afraid, Rape, and Kick questionnaire     Fear of Current or Ex-Partner: No     Emotionally Abused: No     Physically Abused: No     Sexually Abused: No   Housing Stability: Low Risk  (1/17/2024)    Housing Stability Vital Sign     Unable to Pay for Housing in the Last Year: No     Number of Places Lived in the Last Year: 1     Unstable Housing in the Last Year: No       Medications/Allergies     Previous Medications    ALBUTEROL (VENTOLIN HFA) 90 MCG/ACTUATION INHALER    Inhale 2 puffs 2 times a day.    ALBUTEROL 1.25 MG/3 ML NEBULIZER SOLUTION    Take 3 mL (1.25 mg) by nebulization every 6 hours if needed for wheezing.    AMLODIPINE (NORVASC) 10 MG TABLET    TAKE 1 TABLET BY MOUTH ONCE DAILY BEFORE DIALYSIS    ASPIRIN 81 MG EC TABLET    Take 1 tablet (81 mg) by mouth once daily.    ATORVASTATIN (LIPITOR) 80 MG TABLET    Take 1 tablet (80 mg) by mouth once daily at bedtime.    BENZONATATE (TESSALON) 200 MG CAPSULE    Take 1 capsule (200 mg) by mouth 3 times a day as needed for cough. Do not crush or chew.    CARVEDILOL (COREG) 25 MG TABLET    Take 1 tablet (25 mg) by mouth 2 times a day.    CLONIDINE (CATAPRES) 0.1 MG TABLET    Take 1 tablet (0.1 mg) by mouth 2 times a day.    EPOETIN KIA (EPOGEN,PROCRIT) 10,000 UNIT/ML INJECTION    Inject 1 mL (10,000 Units) under the skin 3 times a week.    FLUTICASONE (FLONASE) 50 MCG/ACTUATION NASAL SPRAY    Administer 2 sprays into each nostril once daily. Shake gently. Before first use, prime pump. After use, clean tip and replace cap.    FLUTICASONE PROPION-SALMETEROL (ADVAIR DISKUS) 100-50 MCG/DOSE DISKUS INHALER    Inhale 1 puff once daily.    GABAPENTIN (NEURONTIN) 300 MG CAPSULE    Take 1 capsule (300 mg) by mouth once daily at bedtime.    HYDRALAZINE (APRESOLINE) 100 MG TABLET    Take 1 tablet (100 mg) by mouth every 8 hours.    HYDROXYZINE HCL (ATARAX) 10  MG TABLET    Take 1 tablet (10 mg) by mouth every 6 hours if needed for itching.    ISOSORBIDE MONONITRATE ER (IMDUR) 30 MG 24 HR TABLET    Take 1 tablet (30 mg) by mouth once daily. Do not crush or chew.    METOCLOPRAMIDE (REGLAN) 5 MG TABLET    TAKE 1 TABLET BY MOUTH EVERY 6 HOURS AS NEEDED    ONDANSETRON (ZOFRAN) 4 MG TABLET    Take 1 tablet (4 mg) by mouth every 8 hours if needed for nausea or vomiting.    PANTOPRAZOLE (PROTONIX) 40 MG EC TABLET    Take 1 tablet (40 mg) by mouth once daily. Do not crush, chew, or split.    SODIUM CHLORIDE (OCEAN) 0.65 % NASAL SPRAY    Administer 1 spray into each nostril 4 times a day as needed for congestion.    SUMATRIPTAN (IMITREX) 50 MG TABLET    Take 1 tablet (50 mg) by mouth 1 time if needed for migraine.    VALSARTAN (DIOVAN) 320 MG TABLET    Take 1 tablet (320 mg) by mouth once daily.     Allergies   Allergen Reactions    Codeine Itching    Iodine Hives    Shellfish Containing Products Swelling     SEAFOOD        Physical Exam       ED Triage Vitals   Temperature Heart Rate Respirations BP   02/28/24 1527 02/28/24 1527 02/28/24 1527 02/28/24 1527   36.7 °C (98.1 °F) (!) 121 20 (!) 217/101      Pulse Ox Temp src Heart Rate Source Patient Position   02/28/24 1527 -- -- --   (!) 80 %         BP Location FiO2 (%)     -- 02/28/24 1529      36 %         Physical Exam    Physical Exam:     VS: As documented in the triage note and EMR flowsheet from this visit were reviewed.    Appearance: Alert, oriented, cooperative. Sitting upright on exam bed, speaking in full sentences. Wearing nasal cannula.     Skin: Warm, intact and dry.     Neck: Supple, without meningismus.    Pulmonary: Clear bilaterally with good chest wall excursion. No rales, rhonchi or wheezing. No accessory muscle use or stridor. Nonlabored breathing, +5L nasal cannula supplemental oxygen.    Cardiac: Normal S1, S2.    Abdomen: Soft, nontender, active bowel sounds. No palpable organomegaly. No rebound or  guarding.     Musculoskeletal: Spontaneously moving all extremities without limitation.Extremities warm and well-perfused, capillary refill less than 2 seconds. Pulses full and equal.    Neurological:  Cranial nerves II through XII are grossly intact.      Diagnostics   Labs:  Labs Reviewed   CBC WITH AUTO DIFFERENTIAL - Abnormal       Result Value    WBC 7.2      nRBC 0.0      RBC 4.22      Hemoglobin 12.3      Hematocrit 38.3      MCV 91      MCH 29.1      MCHC 32.1      RDW 15.9 (*)     Platelets 185      Neutrophils % 79.8      Immature Granulocytes %, Automated 0.4      Lymphocytes % 9.1      Monocytes % 5.7      Eosinophils % 4.3      Basophils % 0.7      Neutrophils Absolute 5.71      Immature Granulocytes Absolute, Automated 0.03      Lymphocytes Absolute 0.65 (*)     Monocytes Absolute 0.41      Eosinophils Absolute 0.31      Basophils Absolute 0.05     COMPREHENSIVE METABOLIC PANEL - Abnormal    Glucose 80      Sodium 144      Potassium 3.5      Chloride 100      Bicarbonate 28      Anion Gap 20      Urea Nitrogen 32 (*)     Creatinine 7.80 (*)     eGFR 6 (*)     Calcium 9.9      Albumin 4.2      Alkaline Phosphatase 114 (*)     Total Protein 7.6      AST 12      Bilirubin, Total 0.6      ALT 14     TROPONIN I, HIGH SENSITIVITY - Abnormal    Troponin I, High Sensitivity 116 (*)     Narrative:     Less than 99th percentile of normal range cutoff-  Female and children under 18 years old <35 ng/L; Male <54 ng/L: Negative  Repeat testing should be performed if clinically indicated.     Female and children under 18 years old  ng/L; Male  ng/L:  Consistent with possible cardiac damage and possible increased clinical   risk. Serial measurements may help to assess extent of myocardial damage.     >120 ng/L: Consistent with cardiac damage, increased clinical risk and  myocardial infarction. Serial measurements may help assess extent of   myocardial damage.      NOTE: Children less than 1 year old may  have higher baseline troponin   levels and results should be interpreted in conjunction with the overall   clinical context.    NOTE: Troponin I testing is performed using a different   testing methodology at [facility] than at other   [facility] hospitals. Direct result comparisons should only   be made within the same method.     B-TYPE NATRIURETIC PEPTIDE - Abnormal    BNP 2,016 (*)     Narrative:        <100 pg/mL - Heart failure unlikely  100-299 pg/mL - Intermediate probability of acute heart                  failure exacerbation. Correlate with clinical                  context and patient history.    >=300 pg/mL - Heart Failure likely. Correlate with clinical                  context and patient history.     Biotin interference may cause falsely decreased results. Patients taking a Biotin dose of up to 5 mg/day should refrain from taking Biotin for 24 hours before sample  collection. Providers may contact their local laboratory for further information.   SARS-COV-2 AND INFLUENZA A/B PCR - Abnormal    Flu A Result Not Detected      Flu B Result Not Detected      Coronavirus 2019, PCR Detected (*)     Narrative:     This assay has received FDA Emergency Use Authorization (EUA) and  is only authorized for the duration of time that circumstances exist to justify the authorization of the emergency use of in vitro diagnostic tests for the detection of SARS-CoV-2 virus and/or diagnosis of COVID-19 infection under section 564(b)(1) of the Act, 21 U.S.C. 360bbb-3(b)(1). Testing for SARS-CoV-2 is only recommended for patients who meet current clinical and/or epidemiological criteria as defined by federal, state, or local public health directives. This assay is an in vitro diagnostic nucleic acid amplification test for the qualitative detection of SARS-CoV-2, Influenza A, and Influenza B from nasopharyngeal specimens and has been validated for use at [facility]. Negative results do not  preclude COVID-19 infections or Influenza A/B infections, and should not be used as the sole basis for diagnosis, treatment, or other management decisions. If Influenza A/B and RSV PCR results are negative, testing for Parainfluenza virus, Adenovirus and Metapneumovirus is routinely performed for [facility] pediatric oncology and intensive care inpatients, and is available on other patients by placing an add-on request.    TROPONIN I, HIGH SENSITIVITY - Abnormal    Troponin I, High Sensitivity 193 (*)     Narrative:     Less than 99th percentile of normal range cutoff-  Female and children under 18 years old <35 ng/L; Male <54 ng/L: Negative  Repeat testing should be performed if clinically indicated.     Female and children under 18 years old  ng/L; Male  ng/L:  Consistent with possible cardiac damage and possible increased clinical   risk. Serial measurements may help to assess extent of myocardial damage.     >120 ng/L: Consistent with cardiac damage, increased clinical risk and  myocardial infarction. Serial measurements may help assess extent of   myocardial damage.      NOTE: Children less than 1 year old may have higher baseline troponin   levels and results should be interpreted in conjunction with the overall   clinical context.    NOTE: Troponin I testing is performed using a different   testing methodology at [facility] than at other   [facility]. Direct result comparisons should only   be made within the same method.     BLOOD GAS VENOUS FULL PANEL   DRUG SCREEN,URINE       Radiographs:  XR chest 1 view   Final Result   Cardiomegaly with pulmonary vascular congestion.   Interstitial infiltrates most likely representing edema with left   pleural effusion.  When compared to January 16, 2024, the findings   have increased.   Signed by Ananda Alvarado MD          EKG #1 2/28/24 5791:  Sinus tachycardia (112 bpm). Borderline Qtc interval (450ms). No dynamic changes as compared to previous  "EKG on 1/16/24.    ED Course   Visit Vitals  /56 (BP Location: Left arm, Patient Position: Lying)   Pulse 92   Temp 35.9 °C (96.6 °F) (Temporal)   Resp 16   Ht 1.397 m (4' 7\")   Wt 55.8 kg (123 lb)   SpO2 98%   BMI 28.59 kg/m²   OB Status Postmenopausal   Smoking Status Never   BSA 1.47 m²     Medications   hydrALAZINE (Apresoline) tablet  - Omnicell Override Pull (  Not Given 2/28/24 1845)   oxygen (O2) therapy (has no administration in time range)   acetaminophen (Tylenol) tablet 650 mg ( oral See Alternative 2/29/24 0127)     Or   acetaminophen (Tylenol) oral liquid 650 mg (650 mg oral Given 2/29/24 0127)     Or   acetaminophen (Tylenol) suppository 650 mg ( rectal See Alternative 2/29/24 0127)   melatonin tablet 10 mg (has no administration in time range)   polyethylene glycol (Glycolax, Miralax) packet 17 g (has no administration in time range)   benzonatate (Tessalon) capsule 200 mg (200 mg oral Given 2/29/24 0632)   hydrOXYzine HCL (Atarax) tablet 10 mg (has no administration in time range)   albuterol 90 mcg/actuation inhaler 2 puff (2 puffs inhalation Given 2/29/24 0952)   fluticasone (Flonase) nasal spray 2 spray (2 sprays Each Nostril Given 2/29/24 0947)   aspirin EC tablet 81 mg (81 mg oral Given 2/29/24 0947)   amLODIPine (Norvasc) tablet 10 mg (10 mg oral Given 2/29/24 0947)   cloNIDine (Catapres) tablet 0.1 mg (0.1 mg oral Given 2/29/24 0947)   albuterol 90 mcg/actuation inhaler 2 puff (2 puffs inhalation Not Given 2/28/24 2217)   fluticasone furoate-vilanteroL (Breo Ellipta) 100-25 mcg/dose inhaler 1 puff (1 puff inhalation Given 2/29/24 0618)   gabapentin (Neurontin) capsule 300 mg (300 mg oral Given 2/28/24 2220)   hydrALAZINE (Apresoline) tablet 100 mg (100 mg oral Given 2/29/24 3855)   isosorbide mononitrate ER (Imdur) 24 hr tablet 30 mg (30 mg oral Given 2/29/24 0960)   pantoprazole (ProtoNix) EC tablet 40 mg (40 mg oral Given 2/29/24 0923)   valsartan (Diovan) tablet 320 mg (320 mg oral " Given 2/29/24 0947)   carvedilol (Coreg) tablet 25 mg (25 mg oral Given 2/29/24 0947)   hydrALAZINE (Apresoline) injection 5 mg (has no administration in time range)   nitroglycerin (Nitrostat) SL tablet 0.4 mg (0.4 mg sublingual Given 2/28/24 1604)   hydrALAZINE (Apresoline) injection 10 mg (10 mg intravenous Given 2/28/24 1555)   acetaminophen (Tylenol) tablet 975 mg (975 mg oral Given 2/28/24 1835)   albuterol 2.5 mg /3 mL (0.083 %) nebulizer solution 2.5 mg (2.5 mg nebulization Given 2/28/24 1737)   labetaloL (Normodyne,Trandate) injection 20 mg (20 mg intravenous Given 2/28/24 1737)   carvedilol (Coreg) tablet 25 mg (25 mg oral Given 2/28/24 1835)   cloNIDine (Catapres) tablet 0.1 mg (0.1 mg oral Given 2/28/24 1835)   hydrALAZINE (Apresoline) tablet 100 mg (100 mg oral Given 2/28/24 1835)       Medical Decision Making   Placed on continuous cardiac monitoring and pulse oximetry.  Initial EKG shows sinus tachycardia, grossly unchanged from previous EKG on January 16 of this year.  Patient requiring 5 L of nasal cannula due to hypoxia noted in triage of approximately 80%.  Given sublingual nitroglycerin as well as IV hydralazine for hypertension of 217/101 arrival.    Pt is placed on CPAP x approx 1 hour for comfort. O2 saturation appropriate on 5L NC. Results COVID positive. Otherwise, CXR reflects current fluid overload status - trop and BNP are consistent with acute fluid overload as well. PT is given IV hydralazine, IV labetalol with some improvement in BP and tachycardia. Was also given her home medications. BP improves to 159/77 after removed off CPAP and received all BP meds. Nephrology dialysis is consulted to arrange for urgent HD, most likely in the AM given normal K+ without acute EKG changes. PT weaned to 2L nasal cannula. Admitted to general medicine for further care.    Final Impression      1. Hypervolemia    2. COVID    3. Hypoxia          DISPOSITION  Disposition: admitted  Patient condition is:  Stable    Comment: Please note this report has been produced using speech recognition software and may contain errors related to that system including errors in grammar, punctuation, and spelling, as well as words and phrases that may be inappropriate.  If there are any questions or concerns please feel free to contact the dictating provider for clarification.       Krista Saeed PA-C  02/28/24 2117      Attending Note:  The patient was seen by the resident/fellow/MAURILIO.  I have personally performed a substantive portion of the encounter.  I have seen and examined the patient; agree with the workup, evaluation, MDM, management and diagnosis with the exception/addition of the following.  The care plan has been discussed with the resident/fellow; I have reviewed the resident/fellow’s note and agree with the documented findings with the exception/addition of the following:       HPI:   Stacey Heath is a 53 year old female with history of ESRD (IHD T/Th/S), DM2, diabetic retinopathy, HTN, and HFpEF presenting to the ED for concern for shortness of breath. The patient reports that she completed her 3.5 hours session of dialysis yesterday, however, due to hypotension during the session, no fluid was removed.  She reports starting yesterday, she developed chest pressure and a non-productive cough.  Symptoms worsened overnight. She presented to the ED and was hypoxemic on RA to the 80s and placed on 5L NC. She had an episode of NB/NB emesis at that time. She denies abdominal pain but reports no BM for the last several days, which she reports is not particularly unusual for her. Denies illicit drug use.     Per chart review, admitted 1/2024 for chest pain, nausea, vomiting, diarrhea, and hypertension requiring a nitro gtt.    Additional History Obtained from: See HPI       Physical Exam:  General: Grossly well-developed, awake, alert, NAD    Head: Normocephalic, no overt external signs of trauma    ENT: Mucus membranes  moist, nares patent    Neck: Supple, trachea midline, no striodr    Neurologic: A&Ox4, FS, YI x 4 with grossly symmetric strength, SILT grossly intact BUE and BLE    Cardiovascular: Tachycardic but regular, pulses grossly symmetric    Respiratory: Breathing comfortably on NC. Coarse breath sounds at bases    Abdomen: Soft, not appreciably tender, no evident rebound/guarding    Back: No apparent CVA TTP, no midline TLS spine TTP, stepoffs, or acute deformities    MSK: Dialysis fistula with +bruit and thrill. No gross bony deformities in BUE and BLE    Skin/Integumentary: Warm and dry    Psychiatric: Calm and cooperative.       EKG Interpretation:  EKG 2/28/24 - 1545 - sinus tachycardia, rate 112, axis normal, intervals , QRS 78, QTc 450, TWI lateral precordial leads V4-V6 seen previously 1/4/24. Rate increased from 78 on 1/16/24.   Interpreted by provider as above    Differential Diagnoses Considered:  See MDM below    Chronic Medical Conditions Significantly Affecting Care:  See MDM below    External Records Reviewed:  I reviewed recent and relevant outside records as noted in HPI above and MDM below.     Independent Interpretation of Studies:    Laboratory/Imaging Studies:  Laboratory results personally reviewed and independently interpreted:  CBC without significant anemia, thrombocytopenia or leukocytosis  Metabolic panel consistent with ESRD. No hyperkalemia. Creatinine 7.8. , no transaminitis or hyperbilirubinemia  HS troponin 116.  Delta troponin pending  BNP 2016 (prior 2505)  COVID-19 and influenza pending    Radiology imaging and preliminary and/or final reports personally reviewed/independently interpreted:  IMPRESSION:  Cardiomegaly with pulmonary vascular congestion.  Interstitial infiltrates most likely representing edema with left  pleural effusion.  When compared to January 16, 2024, the findings  have increased.    Social Determinants of Health Significantly Affecting Care:  See  HPI/MDM    Prescription Drug Consideration:  See MDM    Diagnostic testing considered:  See MDM and relevant sections      Medical Decision Making/Course:  53 year old female with history of ESRD (IHD T/Th/S), DM2, diabetic retinopathy, HTN, and HFpEF presenting to the ED for concern for shortness of breath.The patient was hypertensive on arrival, with SBP in 217-240 range.  She was hypoxemic in triage to 80s on RA and placed on 5L NC with improvement to high 90s. She was given SL nitroglycerin and IV hydralazine with improvement of SBP to 180s range. She had some decreased work of breathing following. EKG with TWI laterally, seen previously in 1/2024, but new relative to most recent EKG. HS troponin elevated, however, patient denies active chest pain on re-evaluation, and suspect troponin elevated more likely in setting of ESRD and demand, rather than acute type I MI. No obvious STEMI on EKG. Labs without marked acidemia or hyperkalemia.  COVID and repeat troponin pending. Will require dialysis and admission. Patient signed out to oncoming ED provider team at approximately 5 PM, pending above. The patient verbalized understanding and was in agreement with plan.     Esequiel King MD  02/29/24 [id number]

## 2024-02-28 NOTE — ED TRIAGE NOTES
Pt c/o SOB and difficulty breathing since yesterday. Pt c/o mid chest pain starting today. Actively vomiting in triage. T, TH, Sat HD, has not missed any Tx.

## 2024-02-29 ENCOUNTER — APPOINTMENT (OUTPATIENT)
Dept: DIALYSIS | Facility: HOSPITAL | Age: 54
End: 2024-02-29
Payer: COMMERCIAL

## 2024-02-29 LAB — PHOSPHATE SERPL-MCNC: 4.5 MG/DL (ref 2.5–4.9)

## 2024-02-29 PROCEDURE — 99232 SBSQ HOSP IP/OBS MODERATE 35: CPT | Performed by: INTERNAL MEDICINE

## 2024-02-29 PROCEDURE — 2500000002 HC RX 250 W HCPCS SELF ADMINISTERED DRUGS (ALT 637 FOR MEDICARE OP, ALT 636 FOR OP/ED): Performed by: NURSE PRACTITIONER

## 2024-02-29 PROCEDURE — 2500000001 HC RX 250 WO HCPCS SELF ADMINISTERED DRUGS (ALT 637 FOR MEDICARE OP): Performed by: NURSE PRACTITIONER

## 2024-02-29 PROCEDURE — 2500000004 HC RX 250 GENERAL PHARMACY W/ HCPCS (ALT 636 FOR OP/ED): Performed by: NURSE PRACTITIONER

## 2024-02-29 PROCEDURE — 8010000001 HC DIALYSIS - HEMODIALYSIS PER DAY

## 2024-02-29 PROCEDURE — 2500000001 HC RX 250 WO HCPCS SELF ADMINISTERED DRUGS (ALT 637 FOR MEDICARE OP): Performed by: INTERNAL MEDICINE

## 2024-02-29 PROCEDURE — 99221 1ST HOSP IP/OBS SF/LOW 40: CPT | Performed by: INTERNAL MEDICINE

## 2024-02-29 PROCEDURE — XW033E5 INTRODUCTION OF REMDESIVIR ANTI-INFECTIVE INTO PERIPHERAL VEIN, PERCUTANEOUS APPROACH, NEW TECHNOLOGY GROUP 5: ICD-10-PCS | Performed by: INTERNAL MEDICINE

## 2024-02-29 PROCEDURE — 5A1D70Z PERFORMANCE OF URINARY FILTRATION, INTERMITTENT, LESS THAN 6 HOURS PER DAY: ICD-10-PCS | Performed by: INTERNAL MEDICINE

## 2024-02-29 PROCEDURE — 1200000002 HC GENERAL ROOM WITH TELEMETRY DAILY

## 2024-02-29 PROCEDURE — 90935 HEMODIALYSIS ONE EVALUATION: CPT | Performed by: INTERNAL MEDICINE

## 2024-02-29 PROCEDURE — 2500000005 HC RX 250 GENERAL PHARMACY W/O HCPCS: Mod: JZ | Performed by: INTERNAL MEDICINE

## 2024-02-29 PROCEDURE — 2500000004 HC RX 250 GENERAL PHARMACY W/ HCPCS (ALT 636 FOR OP/ED): Performed by: INTERNAL MEDICINE

## 2024-02-29 RX ORDER — CEFTRIAXONE 1 G/50ML
INJECTION, SOLUTION INTRAVENOUS
Status: DISPENSED
Start: 2024-02-29 | End: 2024-03-01

## 2024-02-29 RX ADMIN — ISOSORBIDE MONONITRATE 30 MG: 30 TABLET, EXTENDED RELEASE ORAL at 09:47

## 2024-02-29 RX ADMIN — ACETAMINOPHEN 650 MG: 325 TABLET ORAL at 12:27

## 2024-02-29 RX ADMIN — CLONIDINE HYDROCHLORIDE 0.1 MG: 0.1 TABLET ORAL at 09:47

## 2024-02-29 RX ADMIN — BENZONATATE 200 MG: 100 CAPSULE ORAL at 06:32

## 2024-02-29 RX ADMIN — CARVEDILOL 25 MG: 12.5 TABLET, FILM COATED ORAL at 20:39

## 2024-02-29 RX ADMIN — GABAPENTIN 300 MG: 300 CAPSULE ORAL at 20:39

## 2024-02-29 RX ADMIN — ACETAMINOPHEN 650 MG: 325 TABLET ORAL at 23:21

## 2024-02-29 RX ADMIN — ASPIRIN 81 MG: 81 TABLET, COATED ORAL at 09:47

## 2024-02-29 RX ADMIN — HYDRALAZINE HYDROCHLORIDE 100 MG: 50 TABLET, FILM COATED ORAL at 22:54

## 2024-02-29 RX ADMIN — CARVEDILOL 25 MG: 12.5 TABLET, FILM COATED ORAL at 09:47

## 2024-02-29 RX ADMIN — PANTOPRAZOLE SODIUM 40 MG: 40 TABLET, DELAYED RELEASE ORAL at 09:52

## 2024-02-29 RX ADMIN — REMDESIVIR 100 MG: 100 INJECTION, POWDER, LYOPHILIZED, FOR SOLUTION INTRAVENOUS at 20:39

## 2024-02-29 RX ADMIN — HYDRALAZINE HYDROCHLORIDE 100 MG: 50 TABLET, FILM COATED ORAL at 05:20

## 2024-02-29 RX ADMIN — ACETAMINOPHEN 650 MG: 650 SOLUTION ORAL at 01:27

## 2024-02-29 RX ADMIN — ALBUTEROL SULFATE 2 PUFF: 90 AEROSOL, METERED RESPIRATORY (INHALATION) at 09:52

## 2024-02-29 RX ADMIN — CLONIDINE HYDROCHLORIDE 0.1 MG: 0.1 TABLET ORAL at 20:39

## 2024-02-29 RX ADMIN — ASCORBIC ACID, THIAMINE MONONITRATE,RIBOFLAVIN, NIACINAMIDE, PYRIDOXINE HYDROCHLORIDE, FOLIC ACID, CYANOCOBALAMIN, BIOTIN, CALCIUM PANTOTHENATE, 1 CAPSULE: 100; 1.5; 1.7; 20; 10; 1; 6000; 150000; 5 CAPSULE, LIQUID FILLED ORAL at 19:12

## 2024-02-29 RX ADMIN — FLUTICASONE PROPIONATE 2 SPRAY: 50 SPRAY, METERED NASAL at 09:47

## 2024-02-29 RX ADMIN — ACETAMINOPHEN 650 MG: 325 TABLET ORAL at 19:12

## 2024-02-29 RX ADMIN — ALBUTEROL SULFATE 2 PUFF: 90 AEROSOL, METERED RESPIRATORY (INHALATION) at 20:47

## 2024-02-29 RX ADMIN — VALSARTAN 320 MG: 160 TABLET, FILM COATED ORAL at 09:47

## 2024-02-29 RX ADMIN — AMLODIPINE BESYLATE 10 MG: 10 TABLET ORAL at 09:47

## 2024-02-29 RX ADMIN — HYDRALAZINE HYDROCHLORIDE 5 MG: 20 INJECTION INTRAMUSCULAR; INTRAVENOUS at 22:53

## 2024-02-29 RX ADMIN — FLUTICASONE FUROATE AND VILANTEROL TRIFENATATE 1 PUFF: 100; 25 POWDER RESPIRATORY (INHALATION) at 06:18

## 2024-02-29 ASSESSMENT — PAIN SCALES - GENERAL
PAINLEVEL_OUTOF10: 4
PAINLEVEL_OUTOF10: 4
PAINLEVEL_OUTOF10: 0 - NO PAIN
PAINLEVEL_OUTOF10: 4
PAINLEVEL_OUTOF10: 0 - NO PAIN

## 2024-02-29 ASSESSMENT — PAIN - FUNCTIONAL ASSESSMENT
PAIN_FUNCTIONAL_ASSESSMENT: 0-10
PAIN_FUNCTIONAL_ASSESSMENT: 0-10
PAIN_FUNCTIONAL_ASSESSMENT: NO/DENIES PAIN
PAIN_FUNCTIONAL_ASSESSMENT: 0-10

## 2024-02-29 ASSESSMENT — PAIN DESCRIPTION - ORIENTATION: ORIENTATION: LEFT

## 2024-02-29 ASSESSMENT — PAIN DESCRIPTION - PROGRESSION: CLINICAL_PROGRESSION: NOT CHANGED

## 2024-02-29 ASSESSMENT — PAIN DESCRIPTION - LOCATION
LOCATION: ARM
LOCATION: HEAD

## 2024-02-29 NOTE — H&P
History Of Present Illness  Stacey Heath is a 53 y.o. female with a past medical history of ESRD on HD ([facility]) and poorly controlled HTN who presented to the ED with chest pain and shortness of breath that began yesterday. Of note, the patient has several admissions as of late, including CICU admissions, for HTN emergency/flash pulmonary edema. She reports that yesterday she completed a 3.5 hour HD session with no fluid taken off due to concerns for hypotension. She endorses an associated non productive cough. She does not wear oxygen at baseline, however does typically become hypoxic during episodes of fluid overload. She reports the chest pain in mid-sternal, non radiating. The shortness of breath is present at rest as well as with exertion. She denies any fever or chills, but does report feeling nauseated without any vomiting. She also reports no BM for several days, however is normal for her. On exam in ED14, she reports feeling the same since arrival. She denies smoking, EtOH use, or illicit substance use. She is vaccinated but not boosted against covid-19 and is also recovered.      Past Medical History  Past Medical History:   Diagnosis Date    Acute pancreatitis 01/20/2024    Acute pyelonephritis due to bacteria 01/16/2023    Acute renal failure (CMS/[facility]) 02/25/2023    YARA (acute kidney injury) (CMS/[facility]) 08/03/2021    Bilateral shoulder pain 02/25/2023    Breast pain 05/18/2021    History of breast pain    Cardiac/pericardial tamponade 01/20/2024    Chronic renal failure syndrome 01/10/2022    Community acquired pneumonia 11/03/2021    Dependence on renal dialysis (CMS/[facility]) 11/21/2022    Hemodialysis patient    Disorder of sweat glands 02/25/2023    Dry eye syndrome of bilateral lacrimal glands 03/07/2017    Dry eyes    Essential (primary) hypertension 12/27/2022    Hypertension    Flash pulmonary edema (CMS/[facility]) 01/20/2024    Comment on above: FLASH PULMONARY EDEMA J81.0    History of acute pancreatitis  12/21/2020    History of acute pancreatitis    Low grade squamous intraepithelial lesion (LGSIL) on cervicovaginal cytologic smear 02/25/2023    Migraine headache 02/25/2023    Comment on above: MIGRAINES    Migraines     History of migraine    Obstruction of urinary stent (CMS/[facility]) 02/25/2023    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    Other conditions influencing health status 09/01/2021    History of nephrostomy    Pain in upper limb 01/20/2024    Comment on above: ARM PAIN    Periorbital cellulitis 06/13/2014    Formatting of this note might be different from the original. Improved Afebrile No pain on EOM Can see clearly from left eye PLAN PO augmentin as outpatient    Pleural effusion 12/12/2023    Pneumonia 04/11/2023    Sepsis (CMS/[facility]) 01/20/2024    Status migrainosus 08/21/2012    Type 2 myocardial infarction (CMS/[facility]) 01/20/2024    Unspecified diastolic (congestive) heart failure (CMS/[facility]) 11/21/2022    Heart failure with preserved left ventricular function (HFpEF)    Vaginal dryness 07/29/2022    Vaginal dryness       Surgical History  Past Surgical History:   Procedure Laterality Date    APPENDECTOMY  03/07/2017    Appendectomy    CT ABDOMEN ANGIOGRAM W AND/OR WO IV CONTRAST  4/23/2023    CT ABDOMEN ANGIOGRAM W AND/OR WO IV CONTRAST [facility] CT    IR VENOGRAM DIALYSIS  8/5/2021    IR VENOGRAM DIALYSIS 8/5/2021    OTHER SURGICAL HISTORY  03/07/2017    Cystoscopy With Pyeloscopy With Removal Of Calculus    OTHER SURGICAL HISTORY  03/07/2017    Anoscopy For Polyp Removal    OTHER SURGICAL HISTORY  12/08/2021    Arteriovenous fistula creation procedure    OTHER SURGICAL HISTORY  12/08/2021    Dialysis tunneled catheter placement    TUBAL LIGATION  03/07/2017    Tubal Ligation        Social History  She reports that she has never smoked. She has never used smokeless tobacco. She reports that she does not drink alcohol and does not use drugs.    Family History  Family History   Problem Relation Name Age  "of Onset    Other (Cerebrovascular Accident) Father      Heart failure Paternal Grandmother      Breast cancer Paternal Grandmother      Other (Primary Cervical Cancer) Paternal Grandmother      Diabetes Paternal Grandfather          Allergies  Codeine, Iodine, and Shellfish containing products    Review of Systems   Constitutional:  Negative for chills and fever.   Respiratory:  Positive for cough and shortness of breath.    Cardiovascular:  Positive for chest pain. Negative for palpitations.   Gastrointestinal:  Negative for abdominal pain, constipation, diarrhea, nausea and vomiting.   Genitourinary:  Negative for dysuria, flank pain, frequency, hematuria and urgency.   All other systems reviewed and are negative.       Physical Exam  Vitals reviewed.   HENT:      Head: Normocephalic and atraumatic.   Neck:      Vascular: JVD present.   Cardiovascular:      Rate and Rhythm: Normal rate and regular rhythm.      Heart sounds: Normal heart sounds.   Pulmonary:      Effort: Pulmonary effort is normal. No respiratory distress.      Breath sounds: Normal air entry. Rhonchi present. No wheezing.   Abdominal:      General: Bowel sounds are normal.      Palpations: Abdomen is soft.      Tenderness: There is no abdominal tenderness.   Musculoskeletal:         General: No deformity.   Skin:     General: Skin is warm and dry.   Neurological:      General: No focal deficit present.      Mental Status: She is alert and oriented to person, place, and time.   Psychiatric:         Mood and Affect: Mood normal.         Behavior: Behavior normal.          Last Recorded Vitals  Blood pressure 159/77, pulse 76, temperature 36.7 °C (98.1 °F), resp. rate 16, height 1.397 m (4' 7\"), weight 55.8 kg (123 lb), SpO2 99 %.    Relevant Results      Lab Results   Component Value Date    WBC 7.2 02/28/2024    HGB 12.3 02/28/2024    HCT 38.3 02/28/2024    MCV 91 02/28/2024     02/28/2024     Lab Results   Component Value Date    GLUCOSE " 80 02/28/2024    CALCIUM 9.9 02/28/2024     02/28/2024    K 3.5 02/28/2024    CO2 28 02/28/2024     02/28/2024    BUN 32 (H) 02/28/2024    CREATININE 7.80 (H) 02/28/2024     ECG 12 lead  Sinus tachycardia  Marked ST abnormality, possible lateral subendocardial injury  Abnormal ECG  When compared with ECG of 16-JAN-2024 02:05,  ST now depressed in Lateral leads  T wave inversion now evident in Anterior leads    See ED provider note for full interpretation and clinical correlation  Confirmed by Aleta Atkinson ([id number]) on 2/28/2024 8:30:50 PM  Also confirmed by Aleta Atkinson ([id number])  on 2/28/2024 9:17:45 PM  XR chest 1 view  Narrative: STUDY:  Chest Radiograph;  02/28/2024  INDICATION:  Chest pain, shortness of breath.  COMPARISON:  01/16/2024 XR chest   ACCESSION NUMBER(S):  [id number]  ORDERING CLINICIAN:  ELOISE CHANG  TECHNIQUE:  Frontal chest was obtained at 16:42 hours.  FINDINGS:  CARDIOMEDIASTINAL SILHOUETTE:  Cardiomediastinal silhouette is enlarged.  The pulmonary vessels are  congested.     LUNGS:  There are bilateral interstitial infiltrates with consolidation in the  left lung base and blunting the left costophrenic angle.     ABDOMEN:  No remarkable upper abdominal findings.     BONES:  No acute osseous changes.  Impression: Cardiomegaly with pulmonary vascular congestion.  Interstitial infiltrates most likely representing edema with left  pleural effusion.  When compared to January 16, 2024, the findings  have increased.  Signed by Ananda Alvarado MD    ED Medication Administration from 02/28/2024 1512 to 02/28/2024 2127         Date/Time Order Dose Route Action Action by     02/28/2024 1555 EST hydrALAZINE (Apresoline) injection 10 mg 10 mg intravenous Given LOAN Turner     02/28/2024 1604 EST nitroglycerin (Nitrostat) SL tablet 0.4 mg 0.4 mg sublingual Given LOAN Turner     02/28/2024 1737 EST albuterol 2.5 mg /3 mL (0.083 %) nebulizer solution 2.5 mg 2.5 mg nebulization Given LOAN Turner      02/28/2024 1737 EST labetaloL (Normodyne,Trandate) injection 20 mg 20 mg intravenous Given Brown,      02/28/2024 1835 EST acetaminophen (Tylenol) tablet 975 mg 975 mg oral Given Brown,      02/28/2024 1835 EST carvedilol (Coreg) tablet 25 mg 25 mg oral Given Brown,      02/28/2024 1835 EST cloNIDine (Catapres) tablet 0.1 mg 0.1 mg oral Given Brown,      02/28/2024 1835 EST hydrALAZINE (Apresoline) tablet 100 mg 100 mg oral Given Brown,      02/28/2024 1845 EST hydrALAZINE (Apresoline) tablet  - Omnicell Override Pull --  Not Given Brown,      02/28/2024 1915 EST niCARdipine (Cardene) 40 mg in sodium chloride 200 mL (0.2 mg/mL) infusion (premix) -- intravenous Not Given CHRISTY Machuca               Assessment/Plan   Principal Problem:    Hypervolemia      #Hypertensive urgency  #Hypervolemia  #ESRD on HD  #HFpEF  #Atypical chest pain  #Acute hypoxia  -Nephrology aware of patient, HD scheduled for AM  -BP improved since arrival  -Continue home medications  -Low threshold for escalation of care given history  -Fluid management per HD  -Renal diet  -Renal dosing where indicated  -UDS to r/o stimulant abuse  -Continuous SPO2 monitoring  -Wean to RA as tolerated  -Nuclear stress test -ve 12/2023  -Doubt ACS  -Telemetry    #Covid-19  -Suspect incidental finding  -Given evidence of #1 likely being the cause of hypoxia, no intervention at this time  -Reasess post HD  -Benzonatate TID for cough  -Droplet plus precautions           Nate Larose, APRN-CNP

## 2024-02-29 NOTE — PROGRESS NOTES
"Stacey Heath is a 53 y.o. female on day 1 of admission presenting with Hypervolemia.    Subjective   No events over night,     Patient is seen on HD,   Reports feeling tired,        Objective     Physical Exam    Last Recorded Vitals  Blood pressure 152/67, pulse 90, temperature 36.6 °C (97.9 °F), temperature source Temporal, resp. rate 11, height 1.397 m (4' 7\"), weight 55.8 kg (123 lb), SpO2 100 %.  Intake/Output last 3 Shifts:  No intake/output data recorded.    Relevant Results  Scheduled medications  albuterol, 2 puff, inhalation, BID  amLODIPine, 10 mg, oral, Daily  aspirin, 81 mg, oral, Daily  B complex-vitamin C-folic acid, 1 capsule, oral, Daily  carvedilol, 25 mg, oral, BID  cefTRIAXone, , ,   cloNIDine, 0.1 mg, oral, BID  fluticasone, 2 spray, Each Nostril, Daily  fluticasone furoate-vilanteroL, 1 puff, inhalation, Daily  gabapentin, 300 mg, oral, Nightly  hydrALAZINE, 100 mg, oral, q8h  isosorbide mononitrate ER, 30 mg, oral, Daily  pantoprazole, 40 mg, oral, Daily  valsartan, 320 mg, oral, Daily      Continuous medications     PRN medications  PRN medications: acetaminophen **OR** acetaminophen **OR** acetaminophen, albuterol, benzonatate, cefTRIAXone, hydrALAZINE, hydrOXYzine HCL, melatonin, oxygen, polyethylene glycol    Results for orders placed or performed during the hospital encounter of 02/28/24 (from the past 24 hour(s))   Sars-CoV-2 and Influenza A/B PCR   Result Value Ref Range    Flu A Result Not Detected Not Detected    Flu B Result Not Detected Not Detected    Coronavirus 2019, PCR Detected (A) Not Detected   Troponin I, High Sensitivity   Result Value Ref Range    Troponin I, High Sensitivity 193 (HH) 0 - 34 ng/L             Malnutrition          I agree with the dietitian's malnutrition diagnosis.      Assessment/Plan   Principal Problem:    Hypervolemia    Patient is a 53 year old female with a PMH of ESRD on HD ([facility]), poorly controlled HTN who is presented to the ED with chest pain " and shortness of breath. Of note, the patient has several admissions as of late, including CICU admissions, for HTN emergency/flash pulmonary edema. She reported that  she completed a 3.5 hour HD session with no fluid taken off due to concerns for hypotension during her previous HD session. She endorses an associated non productive cough. She does not wear oxygen at baseline, however does typically become hypoxic during episodes of fluid overload. She reports the chest pain in mid-sternal, non radiating which is thought to be due to elevated blood pressure.      #Hypertensive urgency  #Hypervolemia  #ESRD on HD  #HFpEF  #Atypical chest pain  #Acute hypoxia  Admission BP is 250/100, which is improved to 150/62 today,   -Continue home medications  --Fluid management per HD  -Renal diet  S/p HD on 2/29,   -Wean to RA as tolerated  -Nuclear stress test -ve 12/2023     #Covid-19  -Suspect incidental diagnosis,   -Monitor,   -Benzonatate TID for cough  -Droplet plus precautions     Prophylaxis: Ambulating.     Dispo; Aim for home, is she feels well.        Chinedu Calderón MD

## 2024-02-29 NOTE — NURSING NOTE
Report from Sending RN:    Report From: Malu ( RN)  Recent Surgery of Procedure: No  Baseline Level of Consciousness (LOC): a/o x 4  Oxygen Use: Yes, 2 liter  Type: NC  Diabetic: No  Last BP Med Given Day of Dialysis: Norvasc 10 mg, 0947 am; carvedilol 25 mg 0947 am; clonidine 0.1 mg 0947 am; hydralazine 100 mg 0520 am; isosorbide 30 mg  Last Pain Med Given: none  Lab Tests to be Obtained with Dialysis: No  Blood Transfusion to be Given During Dialysis: No  Available IV Access: Yes  Medications to be Administered During Dialysis: No  Continuous IV Infusion Running: No  Restraints on Currently or in the Last 24 Hours: No  Hand-Off Communication: No acute overnight or morning events ; vss; Pt did take morning medications; Pt will not need labs; Pt is a full code; Cassidy Gonzalez RN.

## 2024-02-29 NOTE — CONSULTS
CONSULT: NEPHROLOGY SERVICE    REASON FOR CONSULT: ESKD  Admit Date: 2/28/2024  3:30 PM       HPI: Patient is a 53 y.o. female admitted 2/28/2024 with h/o ESKD on HD, HTN, CVA, previous flash pulmonary edema who si coming with days of SOB, worsening in the last 2 days, noted to have +COVID19, no cough, +face edema, uncontrolled BP and cxr with cardiomegaly and vascular congestion. Had iv meds to control BP, pt still in ED waiting for bed  Not missing HD session, but apparently not changing her target wt with minimal UF per sessions     Dialyzes TTS [facility]/Dr Garcia. Last HD 2/27/24 with post weight 54.9; DW 55; ELI AVF  Mircera 75mcg Q 2 weeks last dose given 2/24/24     Past Medical History:   Diagnosis Date    Acute pancreatitis 01/20/2024    Acute pyelonephritis due to bacteria 01/16/2023    Acute renal failure (CMS/[facility]) 02/25/2023    YARA (acute kidney injury) (CMS/[facility]) 08/03/2021    Bilateral shoulder pain 02/25/2023    Breast pain 05/18/2021    History of breast pain    Cardiac/pericardial tamponade 01/20/2024    Chronic renal failure syndrome 01/10/2022    Community acquired pneumonia 11/03/2021    Dependence on renal dialysis (CMS/[facility]) 11/21/2022    Hemodialysis patient    Disorder of sweat glands 02/25/2023    Dry eye syndrome of bilateral lacrimal glands 03/07/2017    Dry eyes    Essential (primary) hypertension 12/27/2022    Hypertension    Flash pulmonary edema (CMS/[facility]) 01/20/2024    Comment on above: FLASH PULMONARY EDEMA J81.0    History of acute pancreatitis 12/21/2020    History of acute pancreatitis    Low grade squamous intraepithelial lesion (LGSIL) on cervicovaginal cytologic smear 02/25/2023    Migraine headache 02/25/2023    Comment on above: MIGRAINES    Migraines     History of migraine    Obstruction of urinary stent (CMS/[facility]) 02/25/2023    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    Other conditions influencing health status 09/01/2021    History of nephrostomy    Pain in upper  limb 01/20/2024    Comment on above: ARM PAIN    Periorbital cellulitis 06/13/2014    Formatting of this note might be different from the original. Improved Afebrile No pain on EOM Can see clearly from left eye PLAN PO augmentin as outpatient    Pleural effusion 12/12/2023    Pneumonia 04/11/2023    Sepsis (CMS/[facility]) 01/20/2024    Status migrainosus 08/21/2012    Type 2 myocardial infarction (CMS/[facility]) 01/20/2024    Unspecified diastolic (congestive) heart failure (CMS/[facility]) 11/21/2022    Heart failure with preserved left ventricular function (HFpEF)    Vaginal dryness 07/29/2022    Vaginal dryness     Allergies: Codeine, Iodine, and Shellfish containing products     Past Surgical History:   Procedure Laterality Date    APPENDECTOMY  03/07/2017    Appendectomy    CT ABDOMEN ANGIOGRAM W AND/OR WO IV CONTRAST  4/23/2023    CT ABDOMEN ANGIOGRAM W AND/OR WO IV CONTRAST [facility] CT    IR VENOGRAM DIALYSIS  8/5/2021    IR VENOGRAM DIALYSIS 8/5/2021    OTHER SURGICAL HISTORY  03/07/2017    Cystoscopy With Pyeloscopy With Removal Of Calculus    OTHER SURGICAL HISTORY  03/07/2017    Anoscopy For Polyp Removal    OTHER SURGICAL HISTORY  12/08/2021    Arteriovenous fistula creation procedure    OTHER SURGICAL HISTORY  12/08/2021    Dialysis tunneled catheter placement    TUBAL LIGATION  03/07/2017    Tubal Ligation       Family History   Problem Relation Name Age of Onset    Other (Cerebrovascular Accident) Father      Heart failure Paternal Grandmother      Breast cancer Paternal Grandmother      Other (Primary Cervical Cancer) Paternal Grandmother      Diabetes Paternal Grandfather         Social History  She reports that she has never smoked. She has never used smokeless tobacco. She reports that she does not drink alcohol and does not use drugs.    Review of Systems  As above    CURRENT HOSP MEDS:    Current Facility-Administered Medications:     acetaminophen (Tylenol) tablet 650 mg, 650 mg, oral, q4h PRN **OR** acetaminophen  (Tylenol) oral liquid 650 mg, 650 mg, oral, q4h PRN, 650 mg at 02/29/24 0127 **OR** acetaminophen (Tylenol) suppository 650 mg, 650 mg, rectal, q4h PRN, LILLY Richardson-CNP    albuterol 90 mcg/actuation inhaler 2 puff, 2 puff, inhalation, BID, LILLY Richardson-CNP, 2 puff at 02/28/24 2220    albuterol 90 mcg/actuation inhaler 2 puff, 2 puff, inhalation, q6h PRN, LILLY Richardson-CNP    amLODIPine (Norvasc) tablet 10 mg, 10 mg, oral, Daily, LILLY Richardson-CNP    aspirin EC tablet 81 mg, 81 mg, oral, Daily, LILLY Richardson-CNP    benzonatate (Tessalon) capsule 200 mg, 200 mg, oral, TID PRN, LILLY Richardson-CNP, 200 mg at 02/29/24 0632    carvedilol (Coreg) tablet 25 mg, 25 mg, oral, BID, LILLY Richardson-CNP    cloNIDine (Catapres) tablet 0.1 mg, 0.1 mg, oral, BID, LILLY Richardson-CNP    fluticasone (Flonase) nasal spray 2 spray, 2 spray, Each Nostril, Daily, LILLY Richardson-CNP    fluticasone furoate-vilanteroL (Breo Ellipta) 100-25 mcg/dose inhaler 1 puff, 1 puff, inhalation, Daily, LILLY Richardson-CNP, 1 puff at 02/29/24 0618    gabapentin (Neurontin) capsule 300 mg, 300 mg, oral, Nightly, LILLY Richardson-CNP, 300 mg at 02/28/24 2220    hydrALAZINE (Apresoline) injection 5 mg, 5 mg, intravenous, q6h PRN, LILLY Richardson-CNP    hydrALAZINE (Apresoline) tablet 100 mg, 100 mg, oral, q8h, LILLY Richardson-CNP, 100 mg at 02/29/24 0520    hydrOXYzine HCL (Atarax) tablet 10 mg, 10 mg, oral, q6h PRN, YONIS Richardson    isosorbide mononitrate ER (Imdur) 24 hr tablet 30 mg, 30 mg, oral, Daily, YONIS Richardson    melatonin tablet 10 mg, 10 mg, oral, Daily PRN, YONIS Richardson    oxygen (O2) therapy, , inhalation, Continuous PRN - O2/gases, Krista Saeed PA-C    pantoprazole (ProtoNix) EC tablet 40 mg, 40 mg, oral, Daily, YONIS Richardson    polyethylene glycol (Glycolax,  Miralax) packet 17 g, 17 g, oral, Daily PRN, LILLY Richardson-CNP    valsartan (Diovan) tablet 320 mg, 320 mg, oral, Daily, YONIS Richardson    Current Outpatient Medications:     albuterol (Ventolin HFA) 90 mcg/actuation inhaler, Inhale 2 puffs 2 times a day., Disp: 18 g, Rfl: 11    albuterol 1.25 mg/3 mL nebulizer solution, Take 3 mL (1.25 mg) by nebulization every 6 hours if needed for wheezing., Disp: 90 mL, Rfl: 11    amLODIPine (Norvasc) 10 mg tablet, TAKE 1 TABLET BY MOUTH ONCE DAILY BEFORE DIALYSIS, Disp: 30 tablet, Rfl: 11    aspirin 81 mg EC tablet, Take 1 tablet (81 mg) by mouth once daily., Disp: , Rfl:     atorvastatin (Lipitor) 80 mg tablet, Take 1 tablet (80 mg) by mouth once daily at bedtime., Disp: 30 tablet, Rfl: 0    benzonatate (Tessalon) 200 mg capsule, Take 1 capsule (200 mg) by mouth 3 times a day as needed for cough. Do not crush or chew., Disp: 42 capsule, Rfl: 0    carvedilol (Coreg) 25 mg tablet, Take 1 tablet (25 mg) by mouth 2 times a day., Disp: 60 tablet, Rfl: 0    cloNIDine (Catapres) 0.1 mg tablet, Take 1 tablet (0.1 mg) by mouth 2 times a day., Disp: 720 tablet, Rfl: 0    epoetin joceline (Epogen,Procrit) 10,000 unit/mL injection, Inject 1 mL (10,000 Units) under the skin 3 times a week., Disp: , Rfl:     fluticasone (Flonase) 50 mcg/actuation nasal spray, Administer 2 sprays into each nostril once daily. Shake gently. Before first use, prime pump. After use, clean tip and replace cap., Disp: 16 g, Rfl: 12    fluticasone propion-salmeteroL (Advair Diskus) 100-50 mcg/dose diskus inhaler, Inhale 1 puff once daily., Disp: 60 each, Rfl: 0    gabapentin (Neurontin) 300 mg capsule, Take 1 capsule (300 mg) by mouth once daily at bedtime., Disp: 30 capsule, Rfl: 0    hydrALAZINE (Apresoline) 100 mg tablet, Take 1 tablet (100 mg) by mouth every 8 hours., Disp: 90 tablet, Rfl: 0    hydrOXYzine HCL (Atarax) 10 mg tablet, Take 1 tablet (10 mg) by mouth every 6 hours if needed for  "itching., Disp: 30 tablet, Rfl: 1    isosorbide mononitrate ER (Imdur) 30 mg 24 hr tablet, Take 1 tablet (30 mg) by mouth once daily. Do not crush or chew., Disp: 30 tablet, Rfl: 0    metoclopramide (Reglan) 5 mg tablet, TAKE 1 TABLET BY MOUTH EVERY 6 HOURS AS NEEDED, Disp: 40 tablet, Rfl: 0    ondansetron (Zofran) 4 mg tablet, Take 1 tablet (4 mg) by mouth every 8 hours if needed for nausea or vomiting., Disp: 30 tablet, Rfl: 0    pantoprazole (ProtoNix) 40 mg EC tablet, Take 1 tablet (40 mg) by mouth once daily. Do not crush, chew, or split., Disp: 30 tablet, Rfl: 0    sodium chloride (Ocean) 0.65 % nasal spray, Administer 1 spray into each nostril 4 times a day as needed for congestion., Disp: 44 mL, Rfl: 12    SUMAtriptan (Imitrex) 50 mg tablet, Take 1 tablet (50 mg) by mouth 1 time if needed for migraine., Disp: 30 tablet, Rfl: 3    valsartan (Diovan) 320 mg tablet, Take 1 tablet (320 mg) by mouth once daily., Disp: 30 tablet, Rfl: 0     PHYSICAL EXAM:  /56 (BP Location: Left arm, Patient Position: Lying)   Pulse 96   Temp 35.9 °C (96.6 °F) (Temporal)   Resp 16   Ht 1.397 m (4' 7\")   Wt 55.8 kg (123 lb)   SpO2 98%   BMI 28.59 kg/m²   No intake or output data in the 24 hours ending 02/29/24 0828  Gen: AAO, NAD, thin, face edema   Neck: No JVD  Cardiac: RRR  Resp: decrease BS  Abd: Soft, non tender, +BS, non distended   Ext: No edema   Access: LUE AVF  Neuro: moves 4 ext  Peripheral Pulses: Capillary refill <2secs, strong peripheral pulses.  Skin: Skin color, texture, turgor normal, no suspicious rashes or lesions.    LABS:   Results for orders placed or performed during the hospital encounter of 02/28/24 (from the past 24 hour(s))   CBC and Auto Differential   Result Value Ref Range    WBC 7.2 4.4 - 11.3 x10*3/uL    nRBC 0.0 0.0 - 0.0 /100 WBCs    RBC 4.22 4.00 - 5.20 x10*6/uL    Hemoglobin 12.3 12.0 - 16.0 g/dL    Hematocrit 38.3 36.0 - 46.0 %    MCV 91 80 - 100 fL    MCH 29.1 26.0 - 34.0 pg    " MCHC 32.1 32.0 - 36.0 g/dL    RDW 15.9 (H) 11.5 - 14.5 %    Platelets 185 150 - 450 x10*3/uL    Neutrophils % 79.8 40.0 - 80.0 %    Immature Granulocytes %, Automated 0.4 0.0 - 0.9 %    Lymphocytes % 9.1 13.0 - 44.0 %    Monocytes % 5.7 2.0 - 10.0 %    Eosinophils % 4.3 0.0 - 6.0 %    Basophils % 0.7 0.0 - 2.0 %    Neutrophils Absolute 5.71 1.20 - 7.70 x10*3/uL    Immature Granulocytes Absolute, Automated 0.03 0.00 - 0.70 x10*3/uL    Lymphocytes Absolute 0.65 (L) 1.20 - 4.80 x10*3/uL    Monocytes Absolute 0.41 0.10 - 1.00 x10*3/uL    Eosinophils Absolute 0.31 0.00 - 0.70 x10*3/uL    Basophils Absolute 0.05 0.00 - 0.10 x10*3/uL   Comprehensive metabolic panel   Result Value Ref Range    Glucose 80 74 - 99 mg/dL    Sodium 144 136 - 145 mmol/L    Potassium 3.5 3.5 - 5.3 mmol/L    Chloride 100 98 - 107 mmol/L    Bicarbonate 28 21 - 32 mmol/L    Anion Gap 20 10 - 20 mmol/L    Urea Nitrogen 32 (H) 6 - 23 mg/dL    Creatinine 7.80 (H) 0.50 - 1.05 mg/dL    eGFR 6 (L) >60 mL/min/1.73m*2    Calcium 9.9 8.6 - 10.6 mg/dL    Albumin 4.2 3.4 - 5.0 g/dL    Alkaline Phosphatase 114 (H) 33 - 110 U/L    Total Protein 7.6 6.4 - 8.2 g/dL    AST 12 9 - 39 U/L    Bilirubin, Total 0.6 0.0 - 1.2 mg/dL    ALT 14 7 - 45 U/L   Troponin I, High Sensitivity   Result Value Ref Range    Troponin I, High Sensitivity 116 (H) 0 - 34 ng/L   B-Type Natriuretic Peptide   Result Value Ref Range    BNP 2,016 (H) 0 - 99 pg/mL   ECG 12 lead   Result Value Ref Range    Ventricular Rate 112 BPM    Atrial Rate 112 BPM    HI Interval 140 ms    QRS Duration 78 ms    QT Interval 330 ms    QTC Calculation(Bazett) 450 ms    P Axis 53 degrees    R Axis 21 degrees    T Axis 166 degrees    QRS Count 19 beats    Q Onset 223 ms    P Onset 153 ms    P Offset 202 ms    T Offset 388 ms    QTC Fredericia 406 ms   Sars-CoV-2 and Influenza A/B PCR   Result Value Ref Range    Flu A Result Not Detected Not Detected    Flu B Result Not Detected Not Detected    Coronavirus 2019,  PCR Detected (A) Not Detected   Troponin I, High Sensitivity   Result Value Ref Range    Troponin I, High Sensitivity 193 (HH) 0 - 34 ng/L       DATA:   Diagnostic tests reviewed for today's visit:    Labs and meds    ASSESSMENT AND PLAN:  - ESKD on HD: hypervolemic, needs wt adjustment since she is losing flesh wt  Last HD on 2/27  Planning for routine IHD today with target UF 2.5-3L BP permitting  HTN: suboptimal control, will likely need to lower BP meds to allow for more fluid removal  Lytes and acid base: acceptable  Hb >12, last mircera dose 2/24     Addendum: later seen again on dialysis, tolerating well, target UF 2.5L. Uncontrolled BP, will follow trend, should improve with fluid removal    Greatly appreciate the opportunity to assist in the care of this patient. Will continue to follow.     Signature: Dilip Calderon MD  Division of Nephrology and Hypertension

## 2024-03-01 ENCOUNTER — APPOINTMENT (OUTPATIENT)
Dept: DIALYSIS | Facility: HOSPITAL | Age: 54
End: 2024-03-01
Payer: COMMERCIAL

## 2024-03-01 ENCOUNTER — DOCUMENTATION (OUTPATIENT)
Dept: BEHAVIORAL HEALTH | Facility: CLINIC | Age: 54
End: 2024-03-01
Payer: COMMERCIAL

## 2024-03-01 PROBLEM — N18.6 ESRD (END STAGE RENAL DISEASE) (MULTI): Status: ACTIVE | Noted: 2022-01-10

## 2024-03-01 LAB
ALBUMIN SERPL BCP-MCNC: 3.6 G/DL (ref 3.4–5)
ALP SERPL-CCNC: 94 U/L (ref 33–110)
ALT SERPL W P-5'-P-CCNC: 8 U/L (ref 7–45)
ANION GAP SERPL CALC-SCNC: 17 MMOL/L (ref 10–20)
AST SERPL W P-5'-P-CCNC: 9 U/L (ref 9–39)
BILIRUB SERPL-MCNC: 0.5 MG/DL (ref 0–1.2)
BUN SERPL-MCNC: 34 MG/DL (ref 6–23)
CALCIUM SERPL-MCNC: 9.3 MG/DL (ref 8.6–10.6)
CHLORIDE SERPL-SCNC: 101 MMOL/L (ref 98–107)
CO2 SERPL-SCNC: 30 MMOL/L (ref 21–32)
CREAT SERPL-MCNC: 7.51 MG/DL (ref 0.5–1.05)
EGFRCR SERPLBLD CKD-EPI 2021: 6 ML/MIN/1.73M*2
ERYTHROCYTE [DISTWIDTH] IN BLOOD BY AUTOMATED COUNT: 15.9 % (ref 11.5–14.5)
GLUCOSE SERPL-MCNC: 72 MG/DL (ref 74–99)
HBV SURFACE AB SER-ACNC: 880.3 MIU/ML
HBV SURFACE AG SERPL QL IA: NONREACTIVE
HCT VFR BLD AUTO: 29.1 % (ref 36–46)
HGB BLD-MCNC: 9.1 G/DL (ref 12–16)
MCH RBC QN AUTO: 30.4 PG (ref 26–34)
MCHC RBC AUTO-ENTMCNC: 31.3 G/DL (ref 32–36)
MCV RBC AUTO: 97 FL (ref 80–100)
NRBC BLD-RTO: 0 /100 WBCS (ref 0–0)
PLATELET # BLD AUTO: 214 X10*3/UL (ref 150–450)
POTASSIUM SERPL-SCNC: 4.1 MMOL/L (ref 3.5–5.3)
PROT SERPL-MCNC: 6.8 G/DL (ref 6.4–8.2)
RBC # BLD AUTO: 2.99 X10*6/UL (ref 4–5.2)
SODIUM SERPL-SCNC: 144 MMOL/L (ref 136–145)
WBC # BLD AUTO: 6.3 X10*3/UL (ref 4.4–11.3)

## 2024-03-01 PROCEDURE — 85027 COMPLETE CBC AUTOMATED: CPT | Performed by: INTERNAL MEDICINE

## 2024-03-01 PROCEDURE — 84075 ASSAY ALKALINE PHOSPHATASE: CPT | Performed by: INTERNAL MEDICINE

## 2024-03-01 PROCEDURE — 94640 AIRWAY INHALATION TREATMENT: CPT

## 2024-03-01 PROCEDURE — 86706 HEP B SURFACE ANTIBODY: CPT | Performed by: INTERNAL MEDICINE

## 2024-03-01 PROCEDURE — 2500000001 HC RX 250 WO HCPCS SELF ADMINISTERED DRUGS (ALT 637 FOR MEDICARE OP): Performed by: NURSE PRACTITIONER

## 2024-03-01 PROCEDURE — 2500000002 HC RX 250 W HCPCS SELF ADMINISTERED DRUGS (ALT 637 FOR MEDICARE OP, ALT 636 FOR OP/ED): Performed by: NURSE PRACTITIONER

## 2024-03-01 PROCEDURE — 99232 SBSQ HOSP IP/OBS MODERATE 35: CPT | Performed by: INTERNAL MEDICINE

## 2024-03-01 PROCEDURE — 99233 SBSQ HOSP IP/OBS HIGH 50: CPT | Performed by: NURSE PRACTITIONER

## 2024-03-01 PROCEDURE — 2500000004 HC RX 250 GENERAL PHARMACY W/ HCPCS (ALT 636 FOR OP/ED): Performed by: INTERNAL MEDICINE

## 2024-03-01 PROCEDURE — 36415 COLL VENOUS BLD VENIPUNCTURE: CPT | Performed by: INTERNAL MEDICINE

## 2024-03-01 PROCEDURE — 87340 HEPATITIS B SURFACE AG IA: CPT | Performed by: INTERNAL MEDICINE

## 2024-03-01 PROCEDURE — 1200000002 HC GENERAL ROOM WITH TELEMETRY DAILY

## 2024-03-01 PROCEDURE — 2500000005 HC RX 250 GENERAL PHARMACY W/O HCPCS: Performed by: PHYSICIAN ASSISTANT

## 2024-03-01 PROCEDURE — 2500000005 HC RX 250 GENERAL PHARMACY W/O HCPCS: Mod: JZ | Performed by: INTERNAL MEDICINE

## 2024-03-01 PROCEDURE — 8010000001 HC DIALYSIS - HEMODIALYSIS PER DAY

## 2024-03-01 PROCEDURE — 2500000001 HC RX 250 WO HCPCS SELF ADMINISTERED DRUGS (ALT 637 FOR MEDICARE OP): Performed by: INTERNAL MEDICINE

## 2024-03-01 RX ORDER — FUROSEMIDE 10 MG/ML
80 INJECTION INTRAMUSCULAR; INTRAVENOUS ONCE
Status: COMPLETED | OUTPATIENT
Start: 2024-03-01 | End: 2024-03-01

## 2024-03-01 RX ORDER — DIPHENHYDRAMINE HCL 25 MG
25 CAPSULE ORAL EVERY 6 HOURS PRN
Status: DISCONTINUED | OUTPATIENT
Start: 2024-03-01 | End: 2024-03-03 | Stop reason: HOSPADM

## 2024-03-01 RX ADMIN — GABAPENTIN 300 MG: 300 CAPSULE ORAL at 20:26

## 2024-03-01 RX ADMIN — ASPIRIN 81 MG: 81 TABLET, COATED ORAL at 08:21

## 2024-03-01 RX ADMIN — CARVEDILOL 25 MG: 12.5 TABLET, FILM COATED ORAL at 20:26

## 2024-03-01 RX ADMIN — AMLODIPINE BESYLATE 10 MG: 10 TABLET ORAL at 08:21

## 2024-03-01 RX ADMIN — CARVEDILOL 25 MG: 12.5 TABLET, FILM COATED ORAL at 08:21

## 2024-03-01 RX ADMIN — ISOSORBIDE MONONITRATE 30 MG: 30 TABLET, EXTENDED RELEASE ORAL at 08:21

## 2024-03-01 RX ADMIN — ASCORBIC ACID, THIAMINE MONONITRATE,RIBOFLAVIN, NIACINAMIDE, PYRIDOXINE HYDROCHLORIDE, FOLIC ACID, CYANOCOBALAMIN, BIOTIN, CALCIUM PANTOTHENATE, 1 CAPSULE: 100; 1.5; 1.7; 20; 10; 1; 6000; 150000; 5 CAPSULE, LIQUID FILLED ORAL at 08:21

## 2024-03-01 RX ADMIN — CLONIDINE HYDROCHLORIDE 0.1 MG: 0.1 TABLET ORAL at 20:26

## 2024-03-01 RX ADMIN — CLONIDINE HYDROCHLORIDE 0.1 MG: 0.1 TABLET ORAL at 08:21

## 2024-03-01 RX ADMIN — DIPHENHYDRAMINE HYDROCHLORIDE 25 MG: 25 CAPSULE ORAL at 17:30

## 2024-03-01 RX ADMIN — HYDRALAZINE HYDROCHLORIDE 100 MG: 50 TABLET, FILM COATED ORAL at 17:30

## 2024-03-01 RX ADMIN — PANTOPRAZOLE SODIUM 40 MG: 40 TABLET, DELAYED RELEASE ORAL at 08:21

## 2024-03-01 RX ADMIN — REMDESIVIR 100 MG: 100 INJECTION, POWDER, LYOPHILIZED, FOR SOLUTION INTRAVENOUS at 20:26

## 2024-03-01 RX ADMIN — HYDRALAZINE HYDROCHLORIDE 100 MG: 50 TABLET, FILM COATED ORAL at 06:02

## 2024-03-01 RX ADMIN — FUROSEMIDE 80 MG: 10 INJECTION, SOLUTION INTRAMUSCULAR; INTRAVENOUS at 12:02

## 2024-03-01 RX ADMIN — ALBUTEROL SULFATE 2 PUFF: 90 AEROSOL, METERED RESPIRATORY (INHALATION) at 08:44

## 2024-03-01 RX ADMIN — FLUTICASONE FUROATE AND VILANTEROL TRIFENATATE 1 PUFF: 100; 25 POWDER RESPIRATORY (INHALATION) at 08:44

## 2024-03-01 RX ADMIN — FLUTICASONE PROPIONATE 2 SPRAY: 50 SPRAY, METERED NASAL at 09:00

## 2024-03-01 RX ADMIN — ACETAMINOPHEN 650 MG: 325 TABLET ORAL at 06:03

## 2024-03-01 RX ADMIN — VALSARTAN 320 MG: 160 TABLET, FILM COATED ORAL at 08:21

## 2024-03-01 RX ADMIN — Medication 2 L/MIN: at 08:46

## 2024-03-01 SDOH — SOCIAL STABILITY: SOCIAL INSECURITY: DOES ANYONE TRY TO KEEP YOU FROM HAVING/CONTACTING OTHER FRIENDS OR DOING THINGS OUTSIDE YOUR HOME?: NO

## 2024-03-01 SDOH — SOCIAL STABILITY: SOCIAL INSECURITY: HAS ANYONE EVER THREATENED TO HURT YOUR FAMILY OR YOUR PETS?: NO

## 2024-03-01 SDOH — SOCIAL STABILITY: SOCIAL INSECURITY: HAVE YOU HAD THOUGHTS OF HARMING ANYONE ELSE?: NO

## 2024-03-01 SDOH — SOCIAL STABILITY: SOCIAL INSECURITY: ARE YOU OR HAVE YOU BEEN THREATENED OR ABUSED PHYSICALLY, EMOTIONALLY, OR SEXUALLY BY ANYONE?: NO

## 2024-03-01 SDOH — SOCIAL STABILITY: SOCIAL INSECURITY: ABUSE: ADULT

## 2024-03-01 SDOH — SOCIAL STABILITY: SOCIAL INSECURITY: ARE THERE ANY APPARENT SIGNS OF INJURIES/BEHAVIORS THAT COULD BE RELATED TO ABUSE/NEGLECT?: NO

## 2024-03-01 SDOH — SOCIAL STABILITY: SOCIAL INSECURITY: DO YOU FEEL UNSAFE GOING BACK TO THE PLACE WHERE YOU ARE LIVING?: NO

## 2024-03-01 SDOH — SOCIAL STABILITY: SOCIAL INSECURITY: DO YOU FEEL ANYONE HAS EXPLOITED OR TAKEN ADVANTAGE OF YOU FINANCIALLY OR OF YOUR PERSONAL PROPERTY?: NO

## 2024-03-01 SDOH — SOCIAL STABILITY: SOCIAL INSECURITY: WERE YOU ABLE TO COMPLETE ALL THE BEHAVIORAL HEALTH SCREENINGS?: YES

## 2024-03-01 ASSESSMENT — COGNITIVE AND FUNCTIONAL STATUS - GENERAL
DAILY ACTIVITIY SCORE: 24
MOBILITY SCORE: 24
DAILY ACTIVITIY SCORE: 24
MOBILITY SCORE: 24
PATIENT BASELINE BEDBOUND: NO
DAILY ACTIVITIY SCORE: 24
MOBILITY SCORE: 24

## 2024-03-01 ASSESSMENT — LIFESTYLE VARIABLES
SKIP TO QUESTIONS 9-10: 1
HOW MANY STANDARD DRINKS CONTAINING ALCOHOL DO YOU HAVE ON A TYPICAL DAY: PATIENT DOES NOT DRINK
HOW OFTEN DO YOU HAVE A DRINK CONTAINING ALCOHOL: NEVER
HOW OFTEN DO YOU HAVE 6 OR MORE DRINKS ON ONE OCCASION: NEVER
AUDIT-C TOTAL SCORE: 0
AUDIT-C TOTAL SCORE: 0

## 2024-03-01 ASSESSMENT — ACTIVITIES OF DAILY LIVING (ADL)
ADEQUATE_TO_COMPLETE_ADL: YES
TOILETING: INDEPENDENT
GROOMING: INDEPENDENT
DRESSING YOURSELF: INDEPENDENT
HEARING - LEFT EAR: FUNCTIONAL
HEARING - RIGHT EAR: FUNCTIONAL
LACK_OF_TRANSPORTATION: NO
BATHING: INDEPENDENT
JUDGMENT_ADEQUATE_SAFELY_COMPLETE_DAILY_ACTIVITIES: YES
FEEDING YOURSELF: INDEPENDENT
PATIENT'S MEMORY ADEQUATE TO SAFELY COMPLETE DAILY ACTIVITIES?: YES
WALKS IN HOME: INDEPENDENT

## 2024-03-01 ASSESSMENT — COLUMBIA-SUICIDE SEVERITY RATING SCALE - C-SSRS
2. HAVE YOU ACTUALLY HAD ANY THOUGHTS OF KILLING YOURSELF?: NO
6. HAVE YOU EVER DONE ANYTHING, STARTED TO DO ANYTHING, OR PREPARED TO DO ANYTHING TO END YOUR LIFE?: NO
1. IN THE PAST MONTH, HAVE YOU WISHED YOU WERE DEAD OR WISHED YOU COULD GO TO SLEEP AND NOT WAKE UP?: NO

## 2024-03-01 ASSESSMENT — PATIENT HEALTH QUESTIONNAIRE - PHQ9
SUM OF ALL RESPONSES TO PHQ9 QUESTIONS 1 & 2: 0
1. LITTLE INTEREST OR PLEASURE IN DOING THINGS: NOT AT ALL
2. FEELING DOWN, DEPRESSED OR HOPELESS: NOT AT ALL

## 2024-03-01 ASSESSMENT — PAIN SCALES - GENERAL
PAINLEVEL_OUTOF10: 5 - MODERATE PAIN
PAINLEVEL_OUTOF10: 0 - NO PAIN
PAINLEVEL_OUTOF10: 0 - NO PAIN

## 2024-03-01 ASSESSMENT — PAIN - FUNCTIONAL ASSESSMENT
PAIN_FUNCTIONAL_ASSESSMENT: 0-10
PAIN_FUNCTIONAL_ASSESSMENT: NO/DENIES PAIN

## 2024-03-01 NOTE — PROGRESS NOTES
"Stacey Heath is a 53 y.o. female on day 2 of admission presenting with Hypervolemia.    Subjective   Pt sitting on side of bed. She states sob without o2, feeling a little weak, dizziness but tolerable, denies n/v/d, fever, has np dry cough, denies constipation, chills, chest pain, pain       Objective     Physical Exam  Vitals and nursing note reviewed.   Constitutional:       Appearance: She is ill-appearing.   Eyes:      Comments: Minor periorbital edema   Cardiovascular:      Rate and Rhythm: Normal rate and regular rhythm.   Pulmonary:      Breath sounds: Wheezing present.      Comments: dar lung sounds with minor crackles more noted to lt side  O2 2L nc  Not on home oxygen  Abdominal:      General: There is distension.      Comments: Minor and non-tender to palpation   Genitourinary:     Comments: States makes little urine  Musculoskeletal:      Comments: Ble without edema   Skin:     General: Skin is warm and dry.   Neurological:      Mental Status: She is alert and oriented to person, place, and time.   Psychiatric:         Mood and Affect: Mood normal.         Behavior: Behavior normal.       Last Recorded Vitals  Blood pressure 139/68, pulse 84, temperature 36 °C (96.8 °F), temperature source Skin, resp. rate 16, height 1.397 m (4' 7\"), weight 55.8 kg (123 lb), SpO2 100 %.  Intake/Output last 3 Shifts:  I/O last 3 completed shifts:  In: 400 (7.2 mL/kg) [I.V.:400 (7.2 mL/kg)]  Out: - (0 mL/kg)   Weight: 55.8 kg     Scheduled medications  albuterol, 2 puff, inhalation, BID  amLODIPine, 10 mg, oral, Daily  aspirin, 81 mg, oral, Daily  B complex-vitamin C-folic acid, 1 capsule, oral, Daily  carvedilol, 25 mg, oral, BID  cloNIDine, 0.1 mg, oral, BID  [START ON 3/2/2024] epoetin joceline or biosimilar, 8,000 Units, intravenous, Once per day on Tue Thu Sat  fluticasone, 2 spray, Each Nostril, Daily  fluticasone furoate-vilanteroL, 1 puff, inhalation, Daily  gabapentin, 300 mg, oral, Nightly  hydrALAZINE, 100 mg, " oral, q8h  isosorbide mononitrate ER, 30 mg, oral, Daily  pantoprazole, 40 mg, oral, Daily  remdesivir, 100 mg, intravenous, q24h  valsartan, 320 mg, oral, Daily      Continuous medications     PRN medications  PRN medications: acetaminophen **OR** acetaminophen **OR** acetaminophen, albuterol, benzonatate, hydrALAZINE, hydrOXYzine HCL, melatonin, oxygen, polyethylene glycol   Results for orders placed or performed during the hospital encounter of 02/28/24 (from the past 24 hour(s))   CBC   Result Value Ref Range    WBC 6.3 4.4 - 11.3 x10*3/uL    nRBC 0.0 0.0 - 0.0 /100 WBCs    RBC 2.99 (L) 4.00 - 5.20 x10*6/uL    Hemoglobin 9.1 (L) 12.0 - 16.0 g/dL    Hematocrit 29.1 (L) 36.0 - 46.0 %    MCV 97 80 - 100 fL    MCH 30.4 26.0 - 34.0 pg    MCHC 31.3 (L) 32.0 - 36.0 g/dL    RDW 15.9 (H) 11.5 - 14.5 %    Platelets 214 150 - 450 x10*3/uL   Comprehensive Metabolic Panel   Result Value Ref Range    Glucose 72 (L) 74 - 99 mg/dL    Sodium 144 136 - 145 mmol/L    Potassium 4.1 3.5 - 5.3 mmol/L    Chloride 101 98 - 107 mmol/L    Bicarbonate 30 21 - 32 mmol/L    Anion Gap 17 10 - 20 mmol/L    Urea Nitrogen 34 (H) 6 - 23 mg/dL    Creatinine 7.51 (H) 0.50 - 1.05 mg/dL    eGFR 6 (L) >60 mL/min/1.73m*2    Calcium 9.3 8.6 - 10.6 mg/dL    Albumin 3.6 3.4 - 5.0 g/dL    Alkaline Phosphatase 94 33 - 110 U/L    Total Protein 6.8 6.4 - 8.2 g/dL    AST 9 9 - 39 U/L    Bilirubin, Total 0.5 0.0 - 1.2 mg/dL    ALT 8 7 - 45 U/L                        Assessment/Plan   Principal Problem:    Hypervolemia  Active Problems:    ESRD (end stage renal disease) (CMS/[facility])    Tolerated hemodialysis yesterday with net fluid loss 2000cc. K+=4.1. Since pt is volume overload will do an extra session of IUF today and aim for 2.5hrs /1.5 liters and aim for 2 liters with reg tx 3/2    Bp is unstable with tx's (pt will start off extremely hypertensive but by end of tx pt is hypotensive),hypervolemic on exam and has stable electrolytes      Outpatient Dialysis  schedule:  TTS [facility]/Dr Garcia      Access: rt fistula with +thrill/bruit- no issues - able to achieve      Anemia of ESRD:   3/1-epogen 8000 units on dialysis days.. current hgb 9.1... will cont to monitor.. (Mircera 75mcg q2wks outpt)     CKD-MBD Phosphate Binder:B complex-vitamin C-folic acid (Nephrocaps) capsule 1 capsule      Plan HD tomorrow with UF as tolerated     Renal diet      Please obtain daily standing wt (if possible)     Medication to be adjusted for ESRD      Patient to continue regular HD schedule while inpatient and to follow with the outpatient nephrologist at discharge        LILLY Rebollar-CNP

## 2024-03-01 NOTE — PROGRESS NOTES
Stacey Heath  Age: 53 y.o.  MRN: [id number]  Date: 3/1/2024  Location of service: phone call for care coordination    Program Details  Medicaid Community Clinical Case Management  Status: Enrolled  Effective Dates: 10/17/2023 - present  Responsible Staff: PORFIRIO Valderrama      Goals Reviewed:      Summary:  This writer called that unit patient is on at 1050 to confirm that this writer is allowed to visit the patient even though she has COVID. They state I am allowed to visit as long as I wear full PPE.    Appointment start time: 1050  Appointment completion time: 1055  Total time spent with patient (in minutes): 5      Roberta Gallego RN

## 2024-03-01 NOTE — NURSING NOTE
Report from Sending RN:    Report From: Roro  Recent Surgery of Procedure: No  Baseline Level of Consciousness (LOC): A and O x 4  Oxygen Use: Yes  Type: 2-3 LPM  Diabetic: Yes, diet controlled  Last BP Med Given Day of Dialysis: AM   Last Pain Med Given: None  Lab Tests to be Obtained with Dialysis: Yes  Blood Transfusion to be Given During Dialysis: No  Available IV Access: No  Medications to be Administered During Dialysis: No  Continuous IV Infusion Running: No  Restraints on Currently or in the Last 24 Hours: N/A  Hand-Off Communication: Pt is a full code.

## 2024-03-01 NOTE — PROGRESS NOTES
Stacey Heath  Age: 53 y.o.  MRN: [id number]  Date: 3/1/2024  Location of service: phone call    Program Details  Medicaid Community Clinical Case Management  Status: Enrolled  Effective Dates: 10/17/2023 - present  Responsible Staff: PORFIRIO Valderrama      Goals Reviewed:  Problem: Negative Experience, Conflict with, or Distrust of Providers and/or Health System       Goal: Plan to Address Patient Specific Negative Experience, Distrust, or Conflict with Providers and/or Health System       Priority: High        Problem: Risk of Uncoordinated Care       Goal: Care will be Coordinated and Supported by a Multidisciplinary Team of Providers       Priority: High          Summary:  This writer called patient at 1630 to ensure she got the email I sent with [email or web address] [place]. Patient states she has not checked yet, but did confirm the email address that I sent it to.    Appointment start time: 1630  Appointment completion time: 1633  Total time spent with patient (in minutes): 3      Roberta Gallego RN

## 2024-03-01 NOTE — PROGRESS NOTES
"Stacey Heath is a 53 y.o. female on day 2 of admission presenting with Hypervolemia.    Subjective   No events over night,     Patient is seen on Lk 50 at bedside,   Reports feeling better today  Requiring 2 liters oxygen,          Objective   Vitals:    03/01/24 1400   BP:    Pulse: 84   Resp:    Temp: 36 °C (96.8 °F)   SpO2:          Physical Exam  Constitutional:       Comments: Looks malnourished   HENT:      Head: Normocephalic.      Nose: Nose normal.   Eyes:      Extraocular Movements: Extraocular movements intact.      Pupils: Pupils are equal, round, and reactive to light.   Cardiovascular:      Rate and Rhythm: Normal rate and regular rhythm.      Heart sounds: Normal heart sounds. No murmur heard.  Pulmonary:      Effort: No respiratory distress.      Breath sounds: Normal breath sounds. No wheezing.   Abdominal:      General: There is no distension.      Palpations: Abdomen is soft.      Tenderness: There is no abdominal tenderness.   Musculoskeletal:         General: Normal range of motion.      Cervical back: Normal range of motion.   Skin:     General: Skin is warm and dry.   Neurological:      General: No focal deficit present.      Mental Status: She is alert and oriented to person, place, and time. Mental status is at baseline.   Psychiatric:         Mood and Affect: Mood normal.         Last Recorded Vitals  Blood pressure 139/68, pulse 84, temperature 36 °C (96.8 °F), temperature source Skin, resp. rate 16, height 1.397 m (4' 7\"), weight 55.8 kg (123 lb), SpO2 100 %.  Intake/Output last 3 Shifts:  I/O last 3 completed shifts:  In: 400 (7.2 mL/kg) [I.V.:400 (7.2 mL/kg)]  Out: - (0 mL/kg)   Weight: 55.8 kg     Relevant Results  Scheduled medications  albuterol, 2 puff, inhalation, BID  amLODIPine, 10 mg, oral, Daily  aspirin, 81 mg, oral, Daily  B complex-vitamin C-folic acid, 1 capsule, oral, Daily  carvedilol, 25 mg, oral, BID  cloNIDine, 0.1 mg, oral, BID  [START ON 3/2/2024] epoetin joceline or " biosimilar, 8,000 Units, intravenous, Once per day on Tue Thu Sat  fluticasone, 2 spray, Each Nostril, Daily  fluticasone furoate-vilanteroL, 1 puff, inhalation, Daily  gabapentin, 300 mg, oral, Nightly  hydrALAZINE, 100 mg, oral, q8h  isosorbide mononitrate ER, 30 mg, oral, Daily  pantoprazole, 40 mg, oral, Daily  remdesivir, 100 mg, intravenous, q24h  valsartan, 320 mg, oral, Daily      Continuous medications     PRN medications  PRN medications: acetaminophen **OR** acetaminophen **OR** acetaminophen, albuterol, benzonatate, hydrALAZINE, hydrOXYzine HCL, melatonin, oxygen, polyethylene glycol    Results for orders placed or performed during the hospital encounter of 02/28/24 (from the past 24 hour(s))   CBC   Result Value Ref Range    WBC 6.3 4.4 - 11.3 x10*3/uL    nRBC 0.0 0.0 - 0.0 /100 WBCs    RBC 2.99 (L) 4.00 - 5.20 x10*6/uL    Hemoglobin 9.1 (L) 12.0 - 16.0 g/dL    Hematocrit 29.1 (L) 36.0 - 46.0 %    MCV 97 80 - 100 fL    MCH 30.4 26.0 - 34.0 pg    MCHC 31.3 (L) 32.0 - 36.0 g/dL    RDW 15.9 (H) 11.5 - 14.5 %    Platelets 214 150 - 450 x10*3/uL   Comprehensive Metabolic Panel   Result Value Ref Range    Glucose 72 (L) 74 - 99 mg/dL    Sodium 144 136 - 145 mmol/L    Potassium 4.1 3.5 - 5.3 mmol/L    Chloride 101 98 - 107 mmol/L    Bicarbonate 30 21 - 32 mmol/L    Anion Gap 17 10 - 20 mmol/L    Urea Nitrogen 34 (H) 6 - 23 mg/dL    Creatinine 7.51 (H) 0.50 - 1.05 mg/dL    eGFR 6 (L) >60 mL/min/1.73m*2    Calcium 9.3 8.6 - 10.6 mg/dL    Albumin 3.6 3.4 - 5.0 g/dL    Alkaline Phosphatase 94 33 - 110 U/L    Total Protein 6.8 6.4 - 8.2 g/dL    AST 9 9 - 39 U/L    Bilirubin, Total 0.5 0.0 - 1.2 mg/dL    ALT 8 7 - 45 U/L             Malnutrition          I agree with the dietitian's malnutrition diagnosis.      Assessment/Plan   Principal Problem:    Hypervolemia  Active Problems:    ESRD (end stage renal disease) (CMS/HCC)    Patient is a 53 year old female with a PMH of ESRD on HD ([facility]), poorly controlled HTN  who is presented to the ED with chest pain and shortness of breath. Of note, the patient has several admissions as of late, including CICU admissions, for HTN emergency/flash pulmonary edema. She reported that  she completed a 3.5 hour HD session with no fluid taken off due to concerns for hypotension during her previous HD session. She endorses an associated non productive cough. She does not wear oxygen at baseline, however does typically become hypoxic during episodes of fluid overload. She reports the chest pain in mid-sternal, non radiating which is thought to be due to elevated blood pressure.      #Hypertensive urgency  #Hypervolemia  #ESRD on HD  #HFpEF  #Atypical chest pain  #Acute hypoxia  Admission BP is 250/100, which is improved after HD and with her home meds,   -Continue home medications  --Fluid management per HD  -Renal diet  S/p HD on 2/29,   -Wean to RA as tolerated  -Nuclear stress test -ve 12/2023  Planning for HD tomorrow,      #Covid-19  -Suspect incidental diagnosis,   -Monitor,   -Benzonatate TID for cough  -Droplet plus precautions     Prophylaxis: Ambulating.     Dispo; Aim for home, after HD tomorrow.     Personally reviewed prior inpatient records available, labs, imaging, and consultant notes. Personally discussed with nursing staff and transitional care coordinator. Current work up, and treatment and discharge plan is discussed with the patient and counselling is provided. Repeat labs are ordered as needed.         Chinedu Calderón MD

## 2024-03-01 NOTE — NURSING NOTE
Report to Receiving RN:    Report To: Pat RN   Time Report Called: [id number]  Hand-Off Communication: UF tx completed and tolerated fair, pt c/o cramping towards end. Removed 1 L. Post /77 HR 74   Complications During Treatment: Yes, cramping  Ultrafiltration Treatment: Yes  Medications Administered During Dialysis: No  Blood Products Administered During Dialysis: No  Labs Sent During Dialysis: Yes  Heparin Drip Rate Changes: N/A

## 2024-03-01 NOTE — PROGRESS NOTES
03/01/24 1330   Discharge Planning   Living Arrangements Spouse/significant other   Support Systems Spouse/significant other   Assistance Needed None   Type of Residence Private residence   Who is requesting discharge planning? Patient   Home or Post Acute Services None   Patient expects to be discharged to: Home   Does the patient need discharge transport arranged? No  (Pt will call for a ride home.)   Financial Resource Strain   How hard is it for you to pay for the very basics like food, housing, medical care, and heating? Not very   Housing Stability   In the last 12 months, was there a time when you were not able to pay the mortgage or rent on time? N   In the last 12 months, was there a time when you did not have a steady place to sleep or slept in a shelter (including now)? N   Transportation Needs   In the past 12 months, has lack of transportation kept you from medical appointments or from getting medications? no   In the past 12 months, has lack of transportation kept you from meetings, work, or from getting things needed for daily living? No     Assessment Note:  Spoke with pt via phone, and introduced myself as care coordinator and member of the Care Transitions team for discharge planning.   Pt feels safe at home.   Pt uses [facility] for drs appts.  Pt's address, phone number and contact information was verified.  Pt does not have any questions/concerns at this time.     Previous Home Care: None  DME: Standard walker  Pharmacy: [facility]  PCP:  AUTUMN Spear  Dialysis: [facility] on TTS at 6am.  Nephrologist is [facility] Dr. Brasher.    Otilia Baker MSN, RN-BC  Transitional Care Coordinator (TCC)  [phone]

## 2024-03-01 NOTE — PROGRESS NOTES
Stacey Heath  Age: 53 y.o.  MRN: [id number]  Date: 3/1/2024  Location of service: in community    Program Details  Medicaid Community Clinical Case Management  Status: Enrolled  Effective Dates: 10/17/2023 - present  Responsible Staff: PORFIRIO Valderrama      Goals Reviewed:  Problem: Kidney Issues       Goal: Improve health to get on kidney transplant list       Priority: High        Problem: Negative Experience, Conflict with, or Distrust of Providers and/or Health System       Goal: Plan to Address Patient Specific Negative Experience, Distrust, or Conflict with Providers and/or Health System       Priority: High        Problem: Risk of Uncoordinated Care       Goal: Care will be Coordinated and Supported by a Multidisciplinary Team of Providers       Priority: High          Summary:  This writer met with patient in the hospital. Patient has COVID. This writer wore full PPE.   Patient states she was going to be discharged yesterday but she states her oxygen kept dropping when she took her O2 off so they stated they could not discharge her at that time.   Patient also states her BPs have been fluctuating a lot.  Patient is hoping for discharge this weekend.  Patient [facility] and would like to transfer to a different one. This writer will look into other locations and email patient a list.  Patient discusses some difficulties she is having with her nephew who lives with her and his parents.  Patient discusses difficulties she had with her mother growing up.     Appointment start time: 1138  Appointment completion time: 1245  Total time spent with patient (in minutes): 67      Roberta Gallego RN

## 2024-03-02 ENCOUNTER — APPOINTMENT (OUTPATIENT)
Dept: DIALYSIS | Facility: HOSPITAL | Age: 54
End: 2024-03-02
Payer: COMMERCIAL

## 2024-03-02 PROCEDURE — 2500000002 HC RX 250 W HCPCS SELF ADMINISTERED DRUGS (ALT 637 FOR MEDICARE OP, ALT 636 FOR OP/ED): Performed by: NURSE PRACTITIONER

## 2024-03-02 PROCEDURE — 99232 SBSQ HOSP IP/OBS MODERATE 35: CPT | Performed by: INTERNAL MEDICINE

## 2024-03-02 PROCEDURE — 94640 AIRWAY INHALATION TREATMENT: CPT

## 2024-03-02 PROCEDURE — 2500000001 HC RX 250 WO HCPCS SELF ADMINISTERED DRUGS (ALT 637 FOR MEDICARE OP): Performed by: NURSE PRACTITIONER

## 2024-03-02 PROCEDURE — 2500000005 HC RX 250 GENERAL PHARMACY W/O HCPCS: Performed by: STUDENT IN AN ORGANIZED HEALTH CARE EDUCATION/TRAINING PROGRAM

## 2024-03-02 PROCEDURE — 2500000004 HC RX 250 GENERAL PHARMACY W/ HCPCS (ALT 636 FOR OP/ED): Performed by: NURSE PRACTITIONER

## 2024-03-02 PROCEDURE — 2500000001 HC RX 250 WO HCPCS SELF ADMINISTERED DRUGS (ALT 637 FOR MEDICARE OP): Performed by: INTERNAL MEDICINE

## 2024-03-02 PROCEDURE — 8010000001 HC DIALYSIS - HEMODIALYSIS PER DAY

## 2024-03-02 PROCEDURE — 90935 HEMODIALYSIS ONE EVALUATION: CPT | Performed by: INTERNAL MEDICINE

## 2024-03-02 PROCEDURE — 2500000004 HC RX 250 GENERAL PHARMACY W/ HCPCS (ALT 636 FOR OP/ED): Performed by: INTERNAL MEDICINE

## 2024-03-02 PROCEDURE — 1200000002 HC GENERAL ROOM WITH TELEMETRY DAILY

## 2024-03-02 PROCEDURE — 2500000005 HC RX 250 GENERAL PHARMACY W/O HCPCS: Mod: JZ | Performed by: INTERNAL MEDICINE

## 2024-03-02 PROCEDURE — 2500000004 HC RX 250 GENERAL PHARMACY W/ HCPCS (ALT 636 FOR OP/ED): Mod: JZ | Performed by: NURSE PRACTITIONER

## 2024-03-02 RX ORDER — LIDOCAINE 560 MG/1
1 PATCH PERCUTANEOUS; TOPICAL; TRANSDERMAL DAILY
Status: DISCONTINUED | OUTPATIENT
Start: 2024-03-02 | End: 2024-03-03 | Stop reason: HOSPADM

## 2024-03-02 RX ADMIN — CLONIDINE HYDROCHLORIDE 0.1 MG: 0.1 TABLET ORAL at 09:55

## 2024-03-02 RX ADMIN — ALBUTEROL SULFATE 2 PUFF: 90 AEROSOL, METERED RESPIRATORY (INHALATION) at 09:00

## 2024-03-02 RX ADMIN — HYDRALAZINE HYDROCHLORIDE 100 MG: 50 TABLET, FILM COATED ORAL at 06:13

## 2024-03-02 RX ADMIN — CLONIDINE HYDROCHLORIDE 0.1 MG: 0.1 TABLET ORAL at 20:44

## 2024-03-02 RX ADMIN — FLUTICASONE PROPIONATE 2 SPRAY: 50 SPRAY, METERED NASAL at 09:55

## 2024-03-02 RX ADMIN — HYDRALAZINE HYDROCHLORIDE 100 MG: 50 TABLET, FILM COATED ORAL at 17:36

## 2024-03-02 RX ADMIN — AMLODIPINE BESYLATE 10 MG: 10 TABLET ORAL at 09:55

## 2024-03-02 RX ADMIN — ACETAMINOPHEN 650 MG: 325 TABLET ORAL at 23:34

## 2024-03-02 RX ADMIN — LIDOCAINE 1 PATCH: 4 PATCH TOPICAL at 23:34

## 2024-03-02 RX ADMIN — REMDESIVIR 100 MG: 100 INJECTION, POWDER, LYOPHILIZED, FOR SOLUTION INTRAVENOUS at 20:44

## 2024-03-02 RX ADMIN — FLUTICASONE FUROATE AND VILANTEROL TRIFENATATE 1 PUFF: 100; 25 POWDER RESPIRATORY (INHALATION) at 09:40

## 2024-03-02 RX ADMIN — ASCORBIC ACID, THIAMINE MONONITRATE,RIBOFLAVIN, NIACINAMIDE, PYRIDOXINE HYDROCHLORIDE, FOLIC ACID, CYANOCOBALAMIN, BIOTIN, CALCIUM PANTOTHENATE, 1 CAPSULE: 100; 1.5; 1.7; 20; 10; 1; 6000; 150000; 5 CAPSULE, LIQUID FILLED ORAL at 09:55

## 2024-03-02 RX ADMIN — CARVEDILOL 25 MG: 12.5 TABLET, FILM COATED ORAL at 20:44

## 2024-03-02 RX ADMIN — CARVEDILOL 25 MG: 12.5 TABLET, FILM COATED ORAL at 09:55

## 2024-03-02 RX ADMIN — PANTOPRAZOLE SODIUM 40 MG: 40 TABLET, DELAYED RELEASE ORAL at 09:55

## 2024-03-02 RX ADMIN — HYDRALAZINE HYDROCHLORIDE 100 MG: 50 TABLET, FILM COATED ORAL at 23:41

## 2024-03-02 RX ADMIN — POLYETHYLENE GLYCOL 3350 17 G: 17 POWDER, FOR SOLUTION ORAL at 09:55

## 2024-03-02 RX ADMIN — BENZONATATE 200 MG: 100 CAPSULE ORAL at 09:55

## 2024-03-02 RX ADMIN — ISOSORBIDE MONONITRATE 30 MG: 30 TABLET, EXTENDED RELEASE ORAL at 09:55

## 2024-03-02 RX ADMIN — EPOETIN ALFA 8000 UNITS: 4000 SOLUTION INTRAVENOUS; SUBCUTANEOUS at 23:34

## 2024-03-02 RX ADMIN — GABAPENTIN 300 MG: 300 CAPSULE ORAL at 20:44

## 2024-03-02 RX ADMIN — ASPIRIN 81 MG: 81 TABLET, COATED ORAL at 09:55

## 2024-03-02 RX ADMIN — ACETAMINOPHEN 650 MG: 325 TABLET ORAL at 09:55

## 2024-03-02 RX ADMIN — VALSARTAN 320 MG: 160 TABLET, FILM COATED ORAL at 20:44

## 2024-03-02 ASSESSMENT — PAIN SCALES - GENERAL
PAINLEVEL_OUTOF10: 0 - NO PAIN
PAINLEVEL_OUTOF10: 0 - NO PAIN
PAINLEVEL_OUTOF10: 3

## 2024-03-02 NOTE — CARE PLAN
The patient's goals for the shift include      The clinical goals for the shift include pt will not have any hypertensive episodes    Over the shift, the patient did not make progress toward the following goals. Barriers to progression include compliance with medications. Recommendations to address these barriers include medication educations

## 2024-03-02 NOTE — PROGRESS NOTES
Nephrology Consult Progress Note    Admit Date: 2/28/2024    Interval history:  Seen on dialysis    Resolved edema, improved SOB    CURRENT MEDICATIONS:    Current Facility-Administered Medications:     acetaminophen (Tylenol) tablet 650 mg, 650 mg, oral, q4h PRN, 650 mg at 03/02/24 0955 **OR** acetaminophen (Tylenol) oral liquid 650 mg, 650 mg, oral, q4h PRN, 650 mg at 02/29/24 0127 **OR** acetaminophen (Tylenol) suppository 650 mg, 650 mg, rectal, q4h PRN, Nate Larose APRN-CNP    albuterol 90 mcg/actuation inhaler 2 puff, 2 puff, inhalation, BID, LILLY Richardson-CNP, 2 puff at 03/02/24 0900    albuterol 90 mcg/actuation inhaler 2 puff, 2 puff, inhalation, q6h PRN, Nate Larose APRN-CNP    amLODIPine (Norvasc) tablet 10 mg, 10 mg, oral, Daily, LILLY Richardson-CNP, 10 mg at 03/02/24 0955    aspirin EC tablet 81 mg, 81 mg, oral, Daily, LILLY Richardson-CNP, 81 mg at 03/02/24 0955    B complex-vitamin C-folic acid (Nephrocaps) capsule 1 capsule, 1 capsule, oral, Daily, Chinedu Calderón MD, 1 capsule at 03/02/24 0955    benzonatate (Tessalon) capsule 200 mg, 200 mg, oral, TID PRN, LILLY Richardson-CNP, 200 mg at 03/02/24 0955    carvedilol (Coreg) tablet 25 mg, 25 mg, oral, BID, LILLY Richardson-CNP, 25 mg at 03/02/24 0955    cloNIDine (Catapres) tablet 0.1 mg, 0.1 mg, oral, BID, LILLY Richardson-CNP, 0.1 mg at 03/02/24 0955    diphenhydrAMINE (BENADryl) capsule 25 mg, 25 mg, oral, q6h PRN, Chinedu Calderón MD, 25 mg at 03/01/24 1730    epoetin joceline (Epogen,Procrit) injection 8,000 Units, 8,000 Units, intravenous, Once per day on Tue Thu Sat, YONIS Rebollar    fluticasone (Flonase) nasal spray 2 spray, 2 spray, Each Nostril, Daily, YONIS Richardson, 2 spray at 03/02/24 0955    fluticasone furoate-vilanteroL (Breo Ellipta) 100-25 mcg/dose inhaler 1 puff, 1 puff, inhalation, Daily, YONIS Richardson, 1 puff at 03/02/24 0940     "gabapentin (Neurontin) capsule 300 mg, 300 mg, oral, Nightly, Nate Larose APRN-CNP, 300 mg at 03/01/24 2026    hydrALAZINE (Apresoline) injection 5 mg, 5 mg, intravenous, q6h PRN, Nate Larose APRN-CNP, 5 mg at 02/29/24 2253    hydrALAZINE (Apresoline) tablet 100 mg, 100 mg, oral, q8h, Nate Larose APRN-CNP, 100 mg at 03/02/24 0613    hydrOXYzine HCL (Atarax) tablet 10 mg, 10 mg, oral, q6h PRN, Nate Larose APRN-CNP    isosorbide mononitrate ER (Imdur) 24 hr tablet 30 mg, 30 mg, oral, Daily, Nate Larose APRN-CNP, 30 mg at 03/02/24 0955    melatonin tablet 10 mg, 10 mg, oral, Daily PRN, Nate Larose APRN-CNP    oxygen (O2) therapy, , inhalation, Continuous PRN - O2/gases, Krista Saeed PA-C, 2 L/min at 03/01/24 0846    pantoprazole (ProtoNix) EC tablet 40 mg, 40 mg, oral, Daily, Nate Larose APRN-CNP, 40 mg at 03/02/24 0955    polyethylene glycol (Glycolax, Miralax) packet 17 g, 17 g, oral, Daily PRN, Nate Larose APRN-CNP, 17 g at 03/02/24 0955    remdesivir (Veklury) 100 mg in sodium chloride 0.9% 250 mL IV, 100 mg, intravenous, q24h, Chinedu Calderón MD, Stopped at 03/01/24 2126    valsartan (Diovan) tablet 320 mg, 320 mg, oral, Daily, Nate Larose APRN-CNP, 320 mg at 03/01/24 0821       Intake/Output Summary (Last 24 hours) at 3/2/2024 1344  Last data filed at 3/2/2024 1330  Gross per 24 hour   Intake 1400 ml   Output 1470 ml   Net -70 ml       PHYSICAL EXAM:  /84 (BP Location: Right arm, Patient Position: Lying)   Pulse 88   Temp 36 °C (96.8 °F) (Temporal)   Resp 16   Ht 1.397 m (4' 7\")   Wt 55.8 kg (123 lb)   SpO2 99%   BMI 28.59 kg/m²     Intake/Output Summary (Last 24 hours) at 3/2/2024 1344  Last data filed at 3/2/2024 1330  Gross per 24 hour   Intake 1400 ml   Output 1470 ml   Net -70 ml     Gen: AAO, NAD, thin  Neck: No JVD  Cardiac: RRR  Resp: decrease BS  Abd: Soft, non tender, +BS, non distended   Ext: No edema   Access: LUE " AVF  Neuro: moves 4 ext  Peripheral Pulses: Capillary refill <2secs, strong peripheral pulses.  Skin: Skin color, texture, turgor normal, no suspicious rashes or lesions.    Labs:  Results for orders placed or performed during the hospital encounter of 02/28/24 (from the past 24 hour(s))   Hepatitis B surface antigen   Result Value Ref Range    Hepatitis B Surface AG Nonreactive Nonreactive   Hepatitis B surface antibody   Result Value Ref Range    Hepatitis B Surface .3 (H) <10.0 mIU/mL        DATA:   Diagnostic tests reviewed for today's visit:    New labs and imaging     Assessment and Plan  Pt with ESKD, HTN, CVA, previous flash pulmonary edema who is coming with days of SOB, worsening in the last 2 days, noted to have +COVID19, uncontrolled BP and cxr with cardiomegaly and vascular congestion.  - ESKD on HD: improved hypervolemia and resolved edema, had IUF yesterday only able to take 1.5L and having HD now targeting UF 1.5L again, tolerating well   Pt has a poor tolerance for fluid removal  Keeping hew usual TTS schedule, important to keep a fluid restriction   HTN: still uncontrol but improving, will follow trend   Lytes and acid base: acceptable  Hb 9.1, last mircera dose 2/24    Will continue to follow.     Signature: Dilip Calderon MD

## 2024-03-02 NOTE — NURSING NOTE
Report from Sending RN:    Report From: Otilia ( RN)  Recent Surgery of Procedure: No  Baseline Level of Consciousness (LOC): a/o x 4  Oxygen Use: Yes, 2 liters  Type: NC  Diabetic: No  Last BP Med Given Day of Dialysis: yes  Last Pain Med Given: none  Lab Tests to be Obtained with Dialysis: No  Blood Transfusion to be Given During Dialysis: No  Available IV Access: Yes  Medications to be Administered During Dialysis: No  Continuous IV Infusion Running: No  Restraints on Currently or in the Last 24 Hours: No  Hand-Off Communication: No acute overnight or morning events; vss; Pt did take morning medications; Pt will not need labs; Pt may go off unit without telemetry; Pt is a full code; Cassidy Gonzalez RN.

## 2024-03-02 NOTE — PROGRESS NOTES
"Stacey Heath is a 53 y.o. female on day 3 of admission presenting with Hypervolemia.    Subjective   No events over night,     Patient is seen on Lk 50 at bedside,   Reports feeling well today  Still requiring 2 liters oxygen,     RN reported that she is non adherent with fluid restriction, and diet.          Objective   Vitals:    03/02/24 1509   BP:    Pulse: 81   Resp:    Temp:    SpO2:          Physical Exam  Constitutional:       Comments: Looks malnourished   HENT:      Head: Normocephalic.      Nose: Nose normal.   Eyes:      Extraocular Movements: Extraocular movements intact.      Pupils: Pupils are equal, round, and reactive to light.   Cardiovascular:      Rate and Rhythm: Normal rate and regular rhythm.      Heart sounds: Normal heart sounds. No murmur heard.  Pulmonary:      Effort: No respiratory distress.      Breath sounds: Normal breath sounds. No wheezing.   Abdominal:      General: There is no distension.      Palpations: Abdomen is soft.      Tenderness: There is no abdominal tenderness.   Musculoskeletal:         General: Normal range of motion.      Cervical back: Normal range of motion.   Skin:     General: Skin is warm and dry.   Neurological:      General: No focal deficit present.      Mental Status: She is alert and oriented to person, place, and time. Mental status is at baseline.   Psychiatric:         Mood and Affect: Mood normal.         Last Recorded Vitals  Blood pressure 180/84, pulse 81, temperature 36 °C (96.8 °F), temperature source Skin, resp. rate 16, height 1.397 m (4' 7\"), weight 55.8 kg (123 lb), SpO2 99 %.  Intake/Output last 3 Shifts:  I/O last 3 completed shifts:  In: 1000 (17.9 mL/kg) [I.V.:600 (10.8 mL/kg); Other:400]  Out: 1470 (26.3 mL/kg) [Other:1470]  Weight: 55.8 kg     Relevant Results  Scheduled medications  albuterol, 2 puff, inhalation, BID  amLODIPine, 10 mg, oral, Daily  aspirin, 81 mg, oral, Daily  B complex-vitamin C-folic acid, 1 capsule, oral, " Daily  carvedilol, 25 mg, oral, BID  cloNIDine, 0.1 mg, oral, BID  epoetin joceline or biosimilar, 8,000 Units, intravenous, Once per day on Tue Thu Sat  fluticasone, 2 spray, Each Nostril, Daily  fluticasone furoate-vilanteroL, 1 puff, inhalation, Daily  gabapentin, 300 mg, oral, Nightly  hydrALAZINE, 100 mg, oral, q8h  isosorbide mononitrate ER, 30 mg, oral, Daily  pantoprazole, 40 mg, oral, Daily  remdesivir, 100 mg, intravenous, q24h  valsartan, 320 mg, oral, Daily      Continuous medications     PRN medications  PRN medications: acetaminophen **OR** acetaminophen **OR** acetaminophen, albuterol, benzonatate, diphenhydrAMINE, hydrALAZINE, hydrOXYzine HCL, melatonin, oxygen, polyethylene glycol    No results found for this or any previous visit (from the past 24 hour(s)).            Malnutrition          I agree with the dietitian's malnutrition diagnosis.      Assessment/Plan   Principal Problem:    Hypervolemia  Active Problems:    ESRD (end stage renal disease) (CMS/[facility])    Patient is a 53 year old female with a PMH of ESRD on HD ([facility]), poorly controlled HTN who is presented to the ED with chest pain and shortness of breath. Of note, the patient has several admissions as of late, including CICU admissions, for HTN emergency/flash pulmonary edema. She reported that  she completed a 3.5 hour HD session with no fluid taken off due to concerns for hypotension during her previous HD session. She endorses an associated non productive cough. She does not wear oxygen at baseline, however does typically become hypoxic during episodes of fluid overload. She reports the chest pain in mid-sternal, non radiating which is thought to be due to elevated blood pressure.      #Hypertensive urgency  #Hypervolemia  #ESRD on HD  #HFpEF  #Atypical chest pain  #Acute hypoxia  Admission BP is 250/100, which is improved after HD and with her home meds,   -Continue home medications  --Fluid management per HD  -Renal diet  S/p HD on  2/29,   -Wean oxygen to RA as tolerated  -Nuclear stress test -ve 12/2023  HD to day, with plan to remove 1.5 liters fluid,       #Covid-19  -Suspect incidental diagnosis,   -Monitor,   -Benzonatate TID for cough  Treated with Remdesevir 100 mg daily x 3 doses,   -Droplet plus precautions     Prophylaxis: Ambulating.     Dispo; Aim for home, if able to wean off oxygen,      Personally reviewed prior inpatient records available, labs, imaging, and consultant notes. Personally discussed with nursing staff and transitional care coordinator. Current work up, and treatment and discharge plan is discussed with the patient and counselling is provided. Repeat labs are ordered as needed.         Chinedu Calderón MD

## 2024-03-03 VITALS
DIASTOLIC BLOOD PRESSURE: 74 MMHG | HEART RATE: 86 BPM | RESPIRATION RATE: 16 BRPM | OXYGEN SATURATION: 98 % | SYSTOLIC BLOOD PRESSURE: 168 MMHG | WEIGHT: 123 LBS | BODY MASS INDEX: 28.46 KG/M2 | HEIGHT: 55 IN | TEMPERATURE: 97.5 F

## 2024-03-03 PROCEDURE — 2500000004 HC RX 250 GENERAL PHARMACY W/ HCPCS (ALT 636 FOR OP/ED): Performed by: INTERNAL MEDICINE

## 2024-03-03 PROCEDURE — 94640 AIRWAY INHALATION TREATMENT: CPT

## 2024-03-03 PROCEDURE — 2500000001 HC RX 250 WO HCPCS SELF ADMINISTERED DRUGS (ALT 637 FOR MEDICARE OP): Performed by: NURSE PRACTITIONER

## 2024-03-03 PROCEDURE — 99239 HOSP IP/OBS DSCHRG MGMT >30: CPT | Performed by: INTERNAL MEDICINE

## 2024-03-03 PROCEDURE — 2500000001 HC RX 250 WO HCPCS SELF ADMINISTERED DRUGS (ALT 637 FOR MEDICARE OP): Performed by: INTERNAL MEDICINE

## 2024-03-03 PROCEDURE — 2500000002 HC RX 250 W HCPCS SELF ADMINISTERED DRUGS (ALT 637 FOR MEDICARE OP, ALT 636 FOR OP/ED): Performed by: NURSE PRACTITIONER

## 2024-03-03 RX ORDER — ONDANSETRON HYDROCHLORIDE 2 MG/ML
4 INJECTION, SOLUTION INTRAVENOUS EVERY 8 HOURS PRN
Status: DISCONTINUED | OUTPATIENT
Start: 2024-03-03 | End: 2024-03-03 | Stop reason: HOSPADM

## 2024-03-03 RX ADMIN — AMLODIPINE BESYLATE 10 MG: 10 TABLET ORAL at 10:45

## 2024-03-03 RX ADMIN — BENZONATATE 200 MG: 100 CAPSULE ORAL at 10:45

## 2024-03-03 RX ADMIN — ASCORBIC ACID, THIAMINE MONONITRATE,RIBOFLAVIN, NIACINAMIDE, PYRIDOXINE HYDROCHLORIDE, FOLIC ACID, CYANOCOBALAMIN, BIOTIN, CALCIUM PANTOTHENATE, 1 CAPSULE: 100; 1.5; 1.7; 20; 10; 1; 6000; 150000; 5 CAPSULE, LIQUID FILLED ORAL at 10:45

## 2024-03-03 RX ADMIN — FLUTICASONE FUROATE AND VILANTEROL TRIFENATATE 1 PUFF: 100; 25 POWDER RESPIRATORY (INHALATION) at 09:10

## 2024-03-03 RX ADMIN — HYDRALAZINE HYDROCHLORIDE 100 MG: 50 TABLET, FILM COATED ORAL at 05:27

## 2024-03-03 RX ADMIN — HYDRALAZINE HYDROCHLORIDE 100 MG: 50 TABLET, FILM COATED ORAL at 17:40

## 2024-03-03 RX ADMIN — ONDANSETRON 4 MG: 2 INJECTION INTRAMUSCULAR; INTRAVENOUS at 17:41

## 2024-03-03 RX ADMIN — CARVEDILOL 25 MG: 12.5 TABLET, FILM COATED ORAL at 10:45

## 2024-03-03 RX ADMIN — PANTOPRAZOLE SODIUM 40 MG: 40 TABLET, DELAYED RELEASE ORAL at 10:45

## 2024-03-03 RX ADMIN — ASPIRIN 81 MG: 81 TABLET, COATED ORAL at 10:45

## 2024-03-03 RX ADMIN — ACETAMINOPHEN 650 MG: 325 TABLET ORAL at 10:45

## 2024-03-03 RX ADMIN — CLONIDINE HYDROCHLORIDE 0.1 MG: 0.1 TABLET ORAL at 10:45

## 2024-03-03 RX ADMIN — ALBUTEROL SULFATE 2 PUFF: 90 AEROSOL, METERED RESPIRATORY (INHALATION) at 09:10

## 2024-03-03 RX ADMIN — ISOSORBIDE MONONITRATE 30 MG: 30 TABLET, EXTENDED RELEASE ORAL at 10:45

## 2024-03-03 RX ADMIN — FLUTICASONE PROPIONATE 2 SPRAY: 50 SPRAY, METERED NASAL at 10:45

## 2024-03-03 NOTE — CARE PLAN
Problem: Fall/Injury  Goal: Not fall by end of shift  Outcome: Progressing     Problem: Fall/Injury  Goal: Be free from injury by end of the shift  Outcome: Progressing   The patient's goals for the shift include Patient will not report chest pain during shift.      The clinical goals for the shift include Patient will remain free from injuries during shift    Over the shift, the patient did not make progress toward the following goals. Barriers to progression include patient readiness and refusal. Recommendations to address these barriers include speaking to patient about her refusal. Educate patient .

## 2024-03-03 NOTE — DISCHARGE SUMMARY
Discharge Diagnosis  Hypervolemia    Issues Requiring Follow-Up  HD as scheduled on 3/5 in the community, and PCP follow up for monitoring Blood pressure.     Test Results Pending At Discharge  Pending Labs       No current pending labs.            Hospital Course   Patient is a 53 year old female with a PMH of ESRD on HD (TuThSa), poorly controlled HTN who was presented to the ED with chest pain and SOB. Of note, the patient had several admissions, including CICU admissions, for HTN emergency/flash pulmonary edema. She reported that  she completed a 3.5 hour HD session with no fluid taken off due to concerns for hypotension during her previous HD session. She endorses an associated non productive cough. She does not wear oxygen at baseline, however does typically become hypoxic during episodes of fluid overload. She reportd the chest pain in mid-sternal, non radiating which is thought to be due to elevated blood pressure.  She was tested positive for covid.  Her oxygen sats improved to 99% after HD. Initially it was unclear if hypoxia was due to covid, and treated with Remdesevir 100 mg x 3 days renally adjusted dose.   However her sats improved after HD which was likely due to fluid over load.     Issues addressed during the stay:      #Hypertensive urgency  #Hypervolemia  #ESRD on HD  #HFpEF  #Atypical chest pain  #Acute hypoxia  Admission BP is 250/100, which is improved after HD and with her home meds,   -Continue home medications ( Including carvedilol, clonidine, Hydralazine)  S/p HD on 2/29, and 3/2   Oxygen weaned to RA,   -Nuclear stress test -ve 12/2023  Saturating 99% on RA on 3/3,      #Covid-19  -Suspect incidental diagnosis,   -Benzonatate TID for cough  Treated with Remdesevir 100 mg daily x 3 doses,   Saturating normally on RA    Discharged home in a stable condition.  Discharge day management time > 30 minutes.       Pertinent Physical Exam At Time of Discharge  Physical Exam  Constitutional:        Comments: Comfortable on RA at rest saturating 99% on RA   HENT:      Head: Normocephalic.      Nose: Nose normal.   Eyes:      Extraocular Movements: Extraocular movements intact.      Pupils: Pupils are equal, round, and reactive to light.   Cardiovascular:      Rate and Rhythm: Normal rate and regular rhythm.      Heart sounds: Normal heart sounds. No murmur heard.  Pulmonary:      Effort: No respiratory distress.      Breath sounds: Normal breath sounds. No wheezing.   Abdominal:      General: Bowel sounds are normal. There is no distension.      Palpations: Abdomen is soft.      Tenderness: There is no abdominal tenderness.   Musculoskeletal:         General: Normal range of motion.      Cervical back: Normal range of motion.   Skin:     General: Skin is warm.   Neurological:      General: No focal deficit present.      Mental Status: She is alert and oriented to person, place, and time.   Psychiatric:         Mood and Affect: Mood normal.         Home Medications     Medication List      CONTINUE taking these medications     * albuterol 1.25 mg/3 mL nebulizer solution; Take 3 mL (1.25 mg) by   nebulization every 6 hours if needed for wheezing.   * albuterol 90 mcg/actuation inhaler; Commonly known as: Ventolin HFA;   Inhale 2 puffs 2 times a day.   amLODIPine 10 mg tablet; Commonly known as: Norvasc; TAKE 1 TABLET BY   MOUTH ONCE DAILY BEFORE DIALYSIS   aspirin 81 mg EC tablet   atorvastatin 80 mg tablet; Commonly known as: Lipitor; Take 1 tablet (80   mg) by mouth once daily at bedtime.   benzonatate 200 mg capsule; Commonly known as: Tessalon; Take 1 capsule   (200 mg) by mouth 3 times a day as needed for cough. Do not crush or chew.   carvedilol 25 mg tablet; Commonly known as: Coreg; Take 1 tablet (25 mg)   by mouth 2 times a day.   cloNIDine 0.1 mg tablet; Commonly known as: Catapres; Take 1 tablet (0.1   mg) by mouth 2 times a day.   Deep Sea Nasal 0.65 % nasal spray; Generic drug: sodium chloride;    Administer 1 spray into each nostril 4 times a day as needed for   congestion.   epoetin joceline 10,000 unit/mL injection; Commonly known as:   Epogen,Procrit; Inject 1 mL (10,000 Units) under the skin 3 times a week.   fluticasone 50 mcg/actuation nasal spray; Commonly known as: Flonase;   Administer 2 sprays into each nostril once daily. Shake gently. Before   first use, prime pump. After use, clean tip and replace cap.   fluticasone propion-salmeteroL 100-50 mcg/dose diskus inhaler; Commonly   known as: Advair Diskus; Inhale 1 puff once daily.   gabapentin 300 mg capsule; Commonly known as: Neurontin; Take 1 capsule   (300 mg) by mouth once daily at bedtime.   hydrALAZINE 100 mg tablet; Commonly known as: Apresoline; Take 1 tablet   (100 mg) by mouth every 8 hours.   hydrOXYzine HCL 10 mg tablet; Commonly known as: Atarax; Take 1 tablet   (10 mg) by mouth every 6 hours if needed for itching.   isosorbide mononitrate ER 30 mg 24 hr tablet; Commonly known as: Imdur;   Take 1 tablet (30 mg) by mouth once daily. Do not crush or chew.   metoclopramide 5 mg tablet; Commonly known as: Reglan; TAKE 1 TABLET BY   MOUTH EVERY 6 HOURS AS NEEDED   ondansetron 4 mg tablet; Commonly known as: Zofran; Take 1 tablet (4 mg)   by mouth every 8 hours if needed for nausea or vomiting.   pantoprazole 40 mg EC tablet; Commonly known as: ProtoNix; Take 1 tablet   (40 mg) by mouth once daily. Do not crush, chew, or split.   SUMAtriptan 50 mg tablet; Commonly known as: Imitrex; Take 1 tablet (50   mg) by mouth 1 time if needed for migraine.   valsartan 320 mg tablet; Commonly known as: Diovan; Take 1 tablet (320   mg) by mouth once daily.  * This list has 2 medication(s) that are the same as other medications   prescribed for you. Read the directions carefully, and ask your doctor or   other care provider to review them with you.       Outpatient Follow-Up  Future Appointments   Date Time Provider Department Center   3/4/2024  1:00 PM  Dora [place], LISW [id number] [facility]   3/18/2024  3:30 PM Kaitlin Tafoya MD [id number] [facility]   3/27/2024  1:00 PM Mo Gutiérrez DO [id number] [facility]   3/29/2024  9:00 AM [id number]   3/29/2024 11:00 AM LUANA OrozcoW [id number]   3/29/2024  1:00 PM TXP KIDNEY PROVIDER [id number]   3/29/2024  1:30 PM TXP ABDOMINAL SURGEON [id number]   4/16/2024  1:00 PM Kacey Garzon MD [id number] [facility]   7/15/2024 10:45 AM Antonietta Manzo DPM [id number] [facility]   7/22/2024  2:00 PM Makenna Barraza MD [id number] [facility]       Chinedu Calderón MD

## 2024-03-04 ENCOUNTER — DOCUMENTATION (OUTPATIENT)
Dept: BEHAVIORAL HEALTH | Facility: CLINIC | Age: 54
End: 2024-03-04
Payer: COMMERCIAL

## 2024-03-04 ENCOUNTER — APPOINTMENT (OUTPATIENT)
Dept: BEHAVIORAL HEALTH | Facility: CLINIC | Age: 54
End: 2024-03-04
Payer: COMMERCIAL

## 2024-03-04 DIAGNOSIS — F41.9 ANXIETY: ICD-10-CM

## 2024-03-04 PROCEDURE — H0036 COMM PSY FACE-FACE PER 15MIN: HCPCS | Mod: AJ,HE,GT,HQ | Performed by: SOCIAL WORKER

## 2024-03-04 NOTE — PROGRESS NOTES
Stacey Heath  Age: 53 y.o.  MRN: [id number]  Date: 3/4/2024  Location of service: phone call    Program Details  Medicaid Community Clinical Case Management  Status: Enrolled  Effective Dates: 10/17/2023 - present  Responsible Staff: PORFIRIO Valderrama      Goals Reviewed:  Problem: Anxiety       Goal: Attempts to manage anxiety with help       Priority: High         Goal: Verbalizes ways to manage anxiety       Priority: High         Goal: Implements measures to reduce anxiety       Priority: High        Summary:  Provider and patient met via telephone call. Provider utilized empathic listening to facilitate a discussion with patient about how she is currently doing since she was just discharged from in-patient admission yesterday with Covid-19. Patient stated she is feeling better that she was the previous week but that she will be quarantining for the next 5 days. Provider and patient discussed patient practicing guided meditation as a coping skill while she is home in quarantine. Provider and patient discussed different guided meditations and provider sent patient some links via email. Provider will meet with patient in person the following week after quarantine has ended.     Appointment start time: 1326  Appointment completion time: 1341  Total time spent with patient (in minutes): 15      PORFIRIO Valderrama

## 2024-03-08 ENCOUNTER — DOCUMENTATION (OUTPATIENT)
Dept: BEHAVIORAL HEALTH | Facility: CLINIC | Age: 54
End: 2024-03-08
Payer: COMMERCIAL

## 2024-03-11 ENCOUNTER — APPOINTMENT (OUTPATIENT)
Dept: BEHAVIORAL HEALTH | Facility: CLINIC | Age: 54
End: 2024-03-11
Payer: COMMERCIAL

## 2024-03-11 NOTE — PROGRESS NOTES
Stacey Heath  Age: 53 y.o.  MRN: [id number]  Date: 3/8/2024  Location of service: phone call    Program Details  Medicaid Community Clinical Case Management  Status: Enrolled  Effective Dates: 10/17/2023 - present  Responsible Staff: PORFIRIO Valderrama      Goals Reviewed:  Problem: Anxiety       Goal: Attempts to manage anxiety with help       Priority: High         Goal: Verbalizes ways to manage anxiety       Priority: High         Goal: Implements measures to reduce anxiety       Priority: High        Problem: Risk of Uncoordinated Care       Goal: Care will be Coordinated and Supported by a Multidisciplinary Team of Providers       Priority: High          Summary:  This writer called patient at 1120 to get patient scheduled for an appointment with this writer. Patient is scheduled for 3/15/24 at 1230. Patient states she was able to do some of the guided meditations that Dora MONROY gave her to do. Patient confirms her appointment with Dora MONROY on 3/11/24 at 1300 at this time.    Appointment start time: 1120  Appointment completion time: 1124  Total time spent with patient (in minutes): 4      Roberta Gallego RN

## 2024-03-13 ENCOUNTER — DOCUMENTATION (OUTPATIENT)
Dept: BEHAVIORAL HEALTH | Facility: CLINIC | Age: 54
End: 2024-03-13
Payer: COMMERCIAL

## 2024-03-13 DIAGNOSIS — F41.9 ANXIETY: ICD-10-CM

## 2024-03-13 PROCEDURE — H0036 COMM PSY FACE-FACE PER 15MIN: HCPCS | Mod: AJ,HE,GT,HQ | Performed by: SOCIAL WORKER

## 2024-03-13 NOTE — PROGRESS NOTES
Stacey Heath  Age: 53 y.o.  MRN: [id number]  Date: 3/13/2024  Location of service: phone call    Program Details  Medicaid Community Clinical Case Management  Status: Enrolled  Effective Dates: 10/17/2023 - present  Responsible Staff: PORFIRIO Valderrama      Goals Reviewed:    Problem: Negative Experience, Conflict with, or Distrust of Providers and/or Health System       Goal: Plan to Address Patient Specific Negative Experience, Distrust, or Conflict with Providers and/or Health System       Priority: High        Problem: Risk of Uncoordinated Care       Goal: Care will be Coordinated and Supported by a Multidisciplinary Team of Providers       Priority: High          Summary:  Provider met with patient via telephone call. Provider utilized empathic listening to facilitate a discussion with patient about how she is currently doing. Patient reports that she is currently having trouble breathing and that is has been bothering her for a few days.  Provider encouraged patient to go to ED if symptoms get worse. Provider reached out to Nurse JORDAN Gallego about patient and her concerns. Nurse JORDAN advised patient also to go to ED if symptoms get worse.     Appointment start time: 1605  Appointment completion time: 1620  Total time spent with patient (in minutes): 15      PORFIRIO Valderrama

## 2024-03-15 ENCOUNTER — DOCUMENTATION (OUTPATIENT)
Dept: BEHAVIORAL HEALTH | Facility: CLINIC | Age: 54
End: 2024-03-15
Payer: COMMERCIAL

## 2024-03-18 ENCOUNTER — OFFICE VISIT (OUTPATIENT)
Dept: PULMONOLOGY | Facility: HOSPITAL | Age: 54
End: 2024-03-18
Payer: COMMERCIAL

## 2024-03-18 ENCOUNTER — DOCUMENTATION (OUTPATIENT)
Dept: BEHAVIORAL HEALTH | Facility: CLINIC | Age: 54
End: 2024-03-18

## 2024-03-18 VITALS
SYSTOLIC BLOOD PRESSURE: 184 MMHG | HEART RATE: 81 BPM | RESPIRATION RATE: 20 BRPM | BODY MASS INDEX: 26.85 KG/M2 | TEMPERATURE: 97.9 F | OXYGEN SATURATION: 95 % | DIASTOLIC BLOOD PRESSURE: 72 MMHG | HEIGHT: 55 IN | WEIGHT: 116 LBS

## 2024-03-18 DIAGNOSIS — G47.30 SLEEP APNEA, UNSPECIFIED TYPE: ICD-10-CM

## 2024-03-18 DIAGNOSIS — F41.9 ANXIETY: ICD-10-CM

## 2024-03-18 DIAGNOSIS — R06.02 SOB (SHORTNESS OF BREATH): Primary | ICD-10-CM

## 2024-03-18 PROCEDURE — 3077F SYST BP >= 140 MM HG: CPT | Performed by: STUDENT IN AN ORGANIZED HEALTH CARE EDUCATION/TRAINING PROGRAM

## 2024-03-18 PROCEDURE — 99214 OFFICE O/P EST MOD 30 MIN: CPT | Mod: GC | Performed by: STUDENT IN AN ORGANIZED HEALTH CARE EDUCATION/TRAINING PROGRAM

## 2024-03-18 PROCEDURE — 3044F HG A1C LEVEL LT 7.0%: CPT | Performed by: STUDENT IN AN ORGANIZED HEALTH CARE EDUCATION/TRAINING PROGRAM

## 2024-03-18 PROCEDURE — 3008F BODY MASS INDEX DOCD: CPT | Performed by: STUDENT IN AN ORGANIZED HEALTH CARE EDUCATION/TRAINING PROGRAM

## 2024-03-18 PROCEDURE — 1036F TOBACCO NON-USER: CPT | Performed by: STUDENT IN AN ORGANIZED HEALTH CARE EDUCATION/TRAINING PROGRAM

## 2024-03-18 PROCEDURE — 3078F DIAST BP <80 MM HG: CPT | Performed by: STUDENT IN AN ORGANIZED HEALTH CARE EDUCATION/TRAINING PROGRAM

## 2024-03-18 PROCEDURE — H2019 THER BEHAV SVC, PER 15 MIN: HCPCS | Mod: AJ,HE | Performed by: SOCIAL WORKER

## 2024-03-18 PROCEDURE — 99204 OFFICE O/P NEW MOD 45 MIN: CPT | Performed by: STUDENT IN AN ORGANIZED HEALTH CARE EDUCATION/TRAINING PROGRAM

## 2024-03-18 PROCEDURE — 4010F ACE/ARB THERAPY RXD/TAKEN: CPT | Performed by: STUDENT IN AN ORGANIZED HEALTH CARE EDUCATION/TRAINING PROGRAM

## 2024-03-18 PROCEDURE — 3048F LDL-C <100 MG/DL: CPT | Performed by: STUDENT IN AN ORGANIZED HEALTH CARE EDUCATION/TRAINING PROGRAM

## 2024-03-18 RX ORDER — HYDRALAZINE HYDROCHLORIDE 50 MG/1
50 TABLET, FILM COATED ORAL 3 TIMES DAILY
COMMUNITY
Start: 2023-12-08 | End: 2024-04-14 | Stop reason: HOSPADM

## 2024-03-18 RX ORDER — TORSEMIDE 20 MG/1
TABLET ORAL
COMMUNITY
Start: 2024-03-08 | End: 2024-04-24 | Stop reason: SDUPTHER

## 2024-03-18 RX ORDER — FLUTICASONE FUROATE AND VILANTEROL 100; 25 UG/1; UG/1
POWDER RESPIRATORY (INHALATION)
Status: ON HOLD | COMMUNITY
Start: 2024-01-16 | End: 2024-05-29 | Stop reason: ENTERED-IN-ERROR

## 2024-03-18 RX ORDER — VALSARTAN 160 MG/1
160 TABLET ORAL 2 TIMES DAILY
COMMUNITY
Start: 2023-12-08 | End: 2024-04-24 | Stop reason: WASHOUT

## 2024-03-18 RX ORDER — SUCRALFATE 1 G/1
1 TABLET ORAL
Status: ON HOLD | COMMUNITY
Start: 2023-10-09 | End: 2024-05-29 | Stop reason: ENTERED-IN-ERROR

## 2024-03-18 ASSESSMENT — PAIN SCALES - GENERAL: PAINLEVEL: 0-NO PAIN

## 2024-03-18 NOTE — PATIENT INSTRUCTIONS
Thanks for coming today!    Will get CT scan of the chest. Please call [phone] to schedule.   Will get pulmonary function test, please call [phone].  Will see you in 1 month in the clinic as a follow up.

## 2024-03-18 NOTE — PROGRESS NOTES
Stacey Heath  Age: 53 y.o.  MRN: [id number]  Date: 3/15/2024  Location of service: phone call    Program Details  Medicaid Community Clinical Case Management  Status: Enrolled  Effective Dates: 10/17/2023 - present  Responsible Staff: PORFIRIO Valderrama      Goals Reviewed:      Summary:  This writer called patient at 1030 to confirm our appointment for today. Patient confirms our appointment at this time.  This writer called patient back at 1230 after patient called and left a voicemail stating she had to cancel our appointment for today. This writer called to check on patient. Patient was unable to answer at this time. This writer left a voicemail.     Appointment start time: 1230  Appointment completion time: 1234  Total time spent with patient (in minutes): 4      Roberta Gallego RN

## 2024-03-18 NOTE — PROGRESS NOTES
Subjective   Patient ID: Stacey Heath is a 53 y.o. female who presents for New Patient Visit for SOB.    HPI  This is a 54-year-old female patient with past medical history of end-stage renal disease on hemodialysis, hypertension, diabetes, coronary artery disease.  Patient was referred to pulmonary clinic for shortness of breath.  Patient mentioned that she has exertional shortness of breath that has been on for at 3-4 weeks, is associated with orthopnea and PND's, dry cough, and wheezes, but no swelling.  She mentioned that it feels different than the shortness of breath she gets when volume overloaded. She is compliant with dialysis. She tested positive for COVID19 on 2/28/2024.  Patient uses Breo Ellipta, and albuterol findings PFTs.  Decreased DLCO and air trapping, spirometry on PFTs did not meet ATS criteria.  She Has been using Albuterol up to 4-5 times a day with no help.   She has no pets at home.  She has allergies to dust, grass, and pollens manifested as conjunctivitis, and skin rash but no pulmonary complications.     She is an ex-smoker, started smoking at the age of 19, 5 cig a day, and quit 2 years ago.       Review of Systems  As above.     Objective   Physical Exam  Constitutional:       General: She is not in acute distress.     Appearance: She is not ill-appearing.   HENT:      Mouth/Throat:      Mouth: Mucous membranes are moist.      Pharynx: Oropharynx is clear. No oropharyngeal exudate.   Cardiovascular:      Rate and Rhythm: Normal rate and regular rhythm.      Pulses: Normal pulses.      Heart sounds: No murmur heard.  Pulmonary:      Effort: Pulmonary effort is normal. No respiratory distress.      Breath sounds: Normal breath sounds. No stridor. No wheezing.   Abdominal:      General: There is no distension.      Palpations: Abdomen is soft.      Tenderness: There is no abdominal tenderness.   Musculoskeletal:         General: No swelling or tenderness.   Skin:     General: Skin is  warm and dry.   Neurological:      General: No focal deficit present.      Mental Status: She is alert.   Psychiatric:         Mood and Affect: Mood normal.       PFTs 7/2022:  Spirometry did not meet ATS criteria.   Airtrapping, and decreased DLCO.    TTE 12/2023:  Pericardial effusion was absent.   Other Pertinent Findings Included: Normal LVEF and Normal RV Size (Left   ventricular wall was very thick.).       Assessment/Plan   Diagnoses and all orders for this visit:  SOB (shortness of breath)  MATHEW, orthopnea and PNDs are symptoms of pulmonary edema, and there is a possibility that she needs more aggressive fluids removal with HD. Worsening of left pleural effsuion or new right pleural effusion are also possible. Parenchymal disease from COVID19 pneumonia is another possibility. Will get CT scan of the chest to assess. Will also get PFTs to assess for COPD. If patient has unremarkable CT scan of the chest, but abnormal PFTs then will consider pulmonary rehab referral.  Patient expressed understanding and agreement with the plan.  -     CT chest wo IV contrast; Future  -     Complete Pulmonary Function Test Pre/Post Bronchodialator (Spirometry Pre/Post/DLCO/Lung Volumes); Future  -     Follow Up In Pulmonology; Future    Sleep apnea, unspecified type  -     Referral to Adult Sleep Medicine; Future    RTC in 1 month after CT scan and PFTs.    Kaitlin Tafoya MD 03/22/24 6:09 PM     ======  I saw and evaluated the patient. I personally obtained the key and critical portions of the history and physical exam or was physically present for key and critical portions performed by the resident/fellow. I reviewed the resident/fellow's documentation and discussed the patient with the resident/fellow. I agree with the resident/fellow's medical decision making as documented in the note.    Sunny Mo MD

## 2024-03-24 NOTE — PROGRESS NOTES
"Stacey Heath  Age: 53 y.o.  MRN: [id number]  Date: 3/18/2024  Location of service: in community    Program Details  Medicaid Community Clinical Case Management  Status: Enrolled  Effective Dates: 10/17/2023 - present  Responsible Staff: PORFIRIO Valderrama      Goals Reviewed:      Problem: Negative Experience, Conflict with, or Distrust of Providers and/or Health System       Goal: Plan to Address Patient Specific Negative Experience, Distrust, or Conflict with Providers and/or Health System       Priority: High        Problem: Risk of Uncoordinated Care       Goal: Care will be Coordinated and Supported by a Multidisciplinary Team of Providers       Priority: High          Summary:  Provider met with patient in the community at her pulmonology appointment. Provider utilized empathic listening to facilitate a discussion with patient about how she is currently doing. Patient reports that she is doing \"okay\"- she is hoping this appointment will help her with getting her breathing better. Provider helped advocate for patient with pulmonologist about her concerns and helped patient understand what pulmonologist was discussing with patient. Provider to help patient with scheduling follow up appointments.      Appointment start time: 1545  Appointment completion time: 1715  Total time spent with patient (in minutes): 90      PORFIRIO Valderrama   "

## 2024-03-27 ENCOUNTER — DOCUMENTATION (OUTPATIENT)
Dept: BEHAVIORAL HEALTH | Facility: CLINIC | Age: 54
End: 2024-03-27

## 2024-03-27 ENCOUNTER — OFFICE VISIT (OUTPATIENT)
Dept: PRIMARY CARE | Facility: CLINIC | Age: 54
End: 2024-03-27
Payer: COMMERCIAL

## 2024-03-27 VITALS
WEIGHT: 116 LBS | BODY MASS INDEX: 26.85 KG/M2 | SYSTOLIC BLOOD PRESSURE: 213 MMHG | OXYGEN SATURATION: 96 % | DIASTOLIC BLOOD PRESSURE: 92 MMHG | TEMPERATURE: 96.1 F | HEART RATE: 90 BPM | HEIGHT: 55 IN

## 2024-03-27 DIAGNOSIS — R63.0 APPETITE LOSS: ICD-10-CM

## 2024-03-27 DIAGNOSIS — E44.0 MALNUTRITION OF MODERATE DEGREE (MULTI): ICD-10-CM

## 2024-03-27 DIAGNOSIS — F41.9 ANXIETY: ICD-10-CM

## 2024-03-27 DIAGNOSIS — I16.0 HYPERTENSIVE URGENCY: Primary | ICD-10-CM

## 2024-03-27 PROCEDURE — 3077F SYST BP >= 140 MM HG: CPT

## 2024-03-27 PROCEDURE — 3008F BODY MASS INDEX DOCD: CPT

## 2024-03-27 PROCEDURE — 3080F DIAST BP >= 90 MM HG: CPT

## 2024-03-27 PROCEDURE — 4010F ACE/ARB THERAPY RXD/TAKEN: CPT

## 2024-03-27 PROCEDURE — 99215 OFFICE O/P EST HI 40 MIN: CPT

## 2024-03-27 PROCEDURE — 3048F LDL-C <100 MG/DL: CPT

## 2024-03-27 PROCEDURE — G2211 COMPLEX E/M VISIT ADD ON: HCPCS

## 2024-03-27 PROCEDURE — H2019 THER BEHAV SVC, PER 15 MIN: HCPCS | Mod: AJ,HE | Performed by: SOCIAL WORKER

## 2024-03-27 PROCEDURE — 3044F HG A1C LEVEL LT 7.0%: CPT

## 2024-03-27 ASSESSMENT — PAIN SCALES - GENERAL: PAINLEVEL: 0-NO PAIN

## 2024-03-27 ASSESSMENT — PATIENT HEALTH QUESTIONNAIRE - PHQ9
1. LITTLE INTEREST OR PLEASURE IN DOING THINGS: NOT AT ALL
SUM OF ALL RESPONSES TO PHQ9 QUESTIONS 1 AND 2: 0
2. FEELING DOWN, DEPRESSED OR HOPELESS: NOT AT ALL

## 2024-03-27 ASSESSMENT — ENCOUNTER SYMPTOMS
OCCASIONAL FEELINGS OF UNSTEADINESS: 0
LOSS OF SENSATION IN FEET: 0
DEPRESSION: 0

## 2024-03-27 NOTE — PROGRESS NOTES
Stacey Heath  Age: 53 y.o.  MRN: [id number]  Date: 3/27/2024  Location of service: phone call    Program Details  Medicaid Community Clinical Case Management  Status: Enrolled  Effective Dates: 10/17/2023 - present  Responsible Staff: PORFIRIO Valderrama      Goals Reviewed:      Summary:  This writer called patient at 0820 to confirm our appointment for today at 0900. Patient confirms appointment at this time.    Appointment start time: 0820  Appointment completion time: 0824  Total time spent with patient (in minutes): 4      Roberta Gallego RN

## 2024-03-27 NOTE — PROGRESS NOTES
Subjective   Patient ID: Stacey Heath is a 53 y.o. female who presents for Follow-up.    HPI   Stacey Heath is a 53 y.o. female with a PMH signficant for ESRD (dialysis days) on HD Tu Th Sa, asthma, and HFpEF who presents for BP fu. Pt presents today with her , Dora Colorado.     Pt was recently admitted to the hospital for four days from 2/28-3/3 for flash pulmonary edema where she was found to be COVID + and treated with HD and Remdesivir. Since discharge, pt has established with pulmonology and is establishing with a new nephrologist in two weeks.     Today in the office, BP is 213/92 however pt denies any symptoms including HA, blurry vision, CP, dyspnea. Pt explains that she did not take her afternoon BP meds before coming to this appointment which rarely ever happens.     Discussed with pt how she used to have home dialysis but she didn't like it because she wasn't able to have home care visit her frequently enough for assistance. Pt endorses that she would be willing to try home dialysis again if she has a good home care schedule.     Pt also endorses a recent 1-2 month history of low appetite. Pt explains that she has had periods like this in the past for which she was drinking Ensure.       Review of Systems   Constitutional:  Positive for appetite change. Negative for fatigue.   HENT: Negative.     Eyes:  Negative for photophobia and visual disturbance.   Respiratory:  Negative for chest tightness and shortness of breath.    Cardiovascular:  Negative for chest pain.   Gastrointestinal:  Negative for abdominal distention, abdominal pain, blood in stool and vomiting.   Endocrine: Negative.    Genitourinary: Negative.    Musculoskeletal: Negative.    Skin: Negative.    Allergic/Immunologic: Negative.    Neurological:  Negative for dizziness, syncope, weakness, light-headedness, numbness and headaches.   Hematological: Negative.    Psychiatric/Behavioral: Negative.         Objective   BP  "(!) 213/92 (BP Location: Left arm, Patient Position: Sitting)   Pulse 90   Temp 35.6 °C (96.1 °F)   Ht 1.397 m (4' 7\")   Wt 52.6 kg (116 lb)   SpO2 96%   BMI 26.96 kg/m²     Physical Exam  Vitals reviewed.   Constitutional:       General: She is not in acute distress.     Appearance: Normal appearance. She is not ill-appearing.   HENT:      Nose: Nose normal.   Eyes:      Extraocular Movements: Extraocular movements intact.      Conjunctiva/sclera: Conjunctivae normal.      Right eye: Right conjunctiva is not injected.      Left eye: Left conjunctiva is not injected.      Pupils: Pupils are equal, round, and reactive to light.      Funduscopic exam:     Right eye: No papilledema.         Left eye: No papilledema.   Cardiovascular:      Rate and Rhythm: Normal rate and regular rhythm.      Heart sounds: Normal heart sounds.   Pulmonary:      Effort: Pulmonary effort is normal.      Breath sounds: Normal breath sounds.   Abdominal:      General: Abdomen is flat. There is no distension.      Palpations: Abdomen is soft.      Tenderness: There is no abdominal tenderness.   Musculoskeletal:         General: Normal range of motion.      Cervical back: Normal range of motion.      Right lower leg: No edema.      Left lower leg: No edema.   Skin:     General: Skin is warm and dry.   Neurological:      Mental Status: She is alert and oriented to person, place, and time.   Psychiatric:         Mood and Affect: Mood normal.         Behavior: Behavior normal.         Assessment/Plan   Diagnoses and all orders for this visit:  Hypertensive urgency  Appetite loss  -     nutritional drink (Ensure) liquid; Take 118 mL by mouth 2 times a day.  Other orders  -     Follow Up In Primary Care; Future      Stacey Heath is a 53 y.o. female with a PMH signficant for ESRD (dialysis days) on HD Tu Th Sa, asthma, and HFpEF who presents for BP fu. BP is uncontrolled in the office today however pt missed her afternoon BP meds. Pt " establishes with a new nephrologist in two weeks; plan to communicate directly with this provider for close management of this pt's HTN and ESRD and possibility of home dialysis to prevent fluid overload in between HD sessions. Pt's SW, Dora Colorado and I have been in close contact regarding pt's needs and interdisciplinary communication.    Hypertensive Urgency  - /92 in the office today; 220/90 on repeat   - Pt is asymptomatic, endorses missing afternoon BP meds  - No acute interventions indicated; pt agreeable to going home immediately to taker her medications  - strategies discussed with pt and SW for adhering to medications when pt needs to leave her home during the day     Appetite Loss  - pt has lost 23 lbs over a two month period  - pt endorses having episodes like this in the past which self resolved  - pt requesting a script for Ensure (ordered)       RTC in 4-6 weeks for continued CDM. Detailed return/ED precautions provided to pt. Pt given permission to contact me directly on [employer]) for communication in between appointments and/or to contact me through her SW Dora Colorado.     Mo Gutiérrez, DO  PGY-1 Family Medicine

## 2024-03-27 NOTE — PROGRESS NOTES
Stacey Heath  Age: 53 y.o.  MRN: [id number]  Date: 3/27/2024  Location of service: in community    Program Details  Medicaid Community Clinical Case Management  Status: Enrolled  Effective Dates: 10/17/2023 - present  Responsible Staff: PORFIRIO Valderrama      Goals Reviewed:    Problem: Negative Experience, Conflict with, or Distrust of Providers and/or Health System       Goal: Plan to Address Patient Specific Negative Experience, Distrust, or Conflict with Providers and/or Health System       Priority: High        Problem: Risk of Uncoordinated Care       Goal: Care will be Coordinated and Supported by a Multidisciplinary Team of Providers       Priority: High          Summary:  Provider met with patient in the community at her PCP appointment. Provider utilized empathic listening to facilitate a discussion with patient about concerns she would like to speak with her PCP about including recent weight loss. Provider helped advocate for patient with her doctor and will follow up for care coordination with PCP.     Appointment start time: 1255  Appointment completion time: 1355  Total time spent with patient (in minutes): 60      PORFIRIO Valderrama

## 2024-03-27 NOTE — PROGRESS NOTES
"Stacey Heath  Age: 53 y.o.  MRN: [id number]  Date: 3/27/2024  Location of service: in community    Program Details  Medicaid Community Clinical Case Management  Status: Enrolled  Effective Dates: 10/17/2023 - present  Responsible Staff: PORFIRIO Valderrama      Goals Reviewed:  Problem: Risk of Uncoordinated Care       Goal: Care will be Coordinated and Supported by a Multidisciplinary Team of Providers       Priority: High          Summary:  This writer met with patient in her home.  Patient states she is feeling good today outside of her breathing and having diarrhea.  Patient states she has had diarrhea since last night. Patient is currently making herself some soup, mostly broth.  Patient states her breathing has been \"different\" but states it's the same as when she saw pulmonary. Patient is concerned about getting her tests and pulmonary follow-up scheduled as soon as possible.  This writer assisted patient with scheduling chest CT, pulmonary function test, appt with sleep medicine, and follow-up with pulmonary.  This writer wrote down all upcoming appointment dates, times, and locations, as well as the instructions for her pulmonary function test. Patient placed this list on her fridge.    Appointment start time: 0900  Appointment completion time: 1019  Total time spent with patient (in minutes): 79      Roberta Gallego RN    "

## 2024-03-28 DIAGNOSIS — Z01.818 PRE-TRANSPLANT EVALUATION FOR KIDNEY TRANSPLANT: ICD-10-CM

## 2024-03-28 DIAGNOSIS — N18.9 CHRONIC KIDNEY DISEASE, UNSPECIFIED CKD STAGE: ICD-10-CM

## 2024-03-28 DIAGNOSIS — Z13.6 ENCOUNTER FOR SCREENING FOR CARDIOVASCULAR DISORDERS: ICD-10-CM

## 2024-03-28 DIAGNOSIS — Z79.899 OTHER LONG TERM (CURRENT) DRUG THERAPY: ICD-10-CM

## 2024-03-29 ENCOUNTER — OFFICE VISIT (OUTPATIENT)
Dept: TRANSPLANT | Facility: HOSPITAL | Age: 54
End: 2024-03-29
Payer: COMMERCIAL

## 2024-03-29 ENCOUNTER — LAB REQUISITION (OUTPATIENT)
Dept: LAB | Facility: CLINIC | Age: 54
End: 2024-03-29
Payer: COMMERCIAL

## 2024-03-29 ENCOUNTER — EDUCATION (OUTPATIENT)
Dept: TRANSPLANT | Facility: HOSPITAL | Age: 54
End: 2024-03-29
Payer: COMMERCIAL

## 2024-03-29 ENCOUNTER — LAB (OUTPATIENT)
Dept: LAB | Facility: LAB | Age: 54
End: 2024-03-29
Payer: COMMERCIAL

## 2024-03-29 VITALS
SYSTOLIC BLOOD PRESSURE: 174 MMHG | HEART RATE: 83 BPM | DIASTOLIC BLOOD PRESSURE: 62 MMHG | WEIGHT: 113.8 LBS | TEMPERATURE: 97.2 F | HEIGHT: 55 IN | BODY MASS INDEX: 26.34 KG/M2 | OXYGEN SATURATION: 93 %

## 2024-03-29 VITALS
DIASTOLIC BLOOD PRESSURE: 62 MMHG | OXYGEN SATURATION: 93 % | HEART RATE: 83 BPM | HEIGHT: 55 IN | TEMPERATURE: 97.2 F | BODY MASS INDEX: 26.34 KG/M2 | SYSTOLIC BLOOD PRESSURE: 174 MMHG | WEIGHT: 113.8 LBS

## 2024-03-29 DIAGNOSIS — Z79.899 OTHER LONG TERM (CURRENT) DRUG THERAPY: ICD-10-CM

## 2024-03-29 DIAGNOSIS — Z13.6 ENCOUNTER FOR SCREENING FOR CARDIOVASCULAR DISORDERS: ICD-10-CM

## 2024-03-29 DIAGNOSIS — Z01.818 PRE-TRANSPLANT EVALUATION FOR KIDNEY TRANSPLANT: ICD-10-CM

## 2024-03-29 DIAGNOSIS — N18.6 ANEMIA IN ESRD (END-STAGE RENAL DISEASE) (MULTI): ICD-10-CM

## 2024-03-29 DIAGNOSIS — D63.1 ANEMIA IN ESRD (END-STAGE RENAL DISEASE) (MULTI): ICD-10-CM

## 2024-03-29 DIAGNOSIS — N18.9 CHRONIC KIDNEY DISEASE, UNSPECIFIED CKD STAGE: ICD-10-CM

## 2024-03-29 DIAGNOSIS — E11.21 DIABETES MELLITUS WITH KIDNEY DISEASE (MULTI): ICD-10-CM

## 2024-03-29 DIAGNOSIS — Z01.818 PRE-TRANSPLANT EVALUATION FOR KIDNEY TRANSPLANT: Primary | ICD-10-CM

## 2024-03-29 DIAGNOSIS — N18.6 END STAGE RENAL DISEASE (MULTI): ICD-10-CM

## 2024-03-29 DIAGNOSIS — N18.6 ESRD (END STAGE RENAL DISEASE) (MULTI): ICD-10-CM

## 2024-03-29 DIAGNOSIS — N18.6 ESRD (END STAGE RENAL DISEASE) (MULTI): Primary | ICD-10-CM

## 2024-03-29 LAB
ABO GROUP (TYPE) IN BLOOD: NORMAL
ALBUMIN SERPL BCP-MCNC: 4.2 G/DL (ref 3.4–5)
ALP SERPL-CCNC: 110 U/L (ref 33–110)
ALT SERPL W P-5'-P-CCNC: 6 U/L (ref 7–45)
AMYLASE SERPL-CCNC: 29 U/L (ref 29–103)
AST SERPL W P-5'-P-CCNC: 13 U/L (ref 9–39)
BILIRUB DIRECT SERPL-MCNC: 0.1 MG/DL (ref 0–0.3)
BILIRUB SERPL-MCNC: 0.5 MG/DL (ref 0–1.2)
BUN SERPL-MCNC: 21 MG/DL (ref 6–23)
C PEPTIDE SERPL-MCNC: 10 NG/ML (ref 0.7–3.9)
CHOLEST SERPL-MCNC: 154 MG/DL (ref 0–199)
CHOLESTEROL/HDL RATIO: 3
CMV IGG AVIDITY SERPL IA-RTO: REACTIVE %
CREAT SERPL-MCNC: 6.18 MG/DL (ref 0.5–1.05)
EBV EA IGG SER QL: NEGATIVE
EBV NA AB SER QL: POSITIVE
EBV VCA IGG SER IA-ACNC: POSITIVE
EBV VCA IGM SER IA-ACNC: NEGATIVE
EGFRCR SERPLBLD CKD-EPI 2021: 8 ML/MIN/1.73M*2
ERYTHROCYTE [DISTWIDTH] IN BLOOD BY AUTOMATED COUNT: 14.6 % (ref 11.5–14.5)
EST. AVERAGE GLUCOSE BLD GHB EST-MCNC: 59 MG/DL
FLOW AUTOCROSSMATCH: NORMAL
HBA1C MFR BLD: 3.7 %
HBV CORE AB SER QL: REACTIVE
HBV SURFACE AB SER-ACNC: >1000 MIU/ML
HBV SURFACE AG SERPL QL IA: NONREACTIVE
HCT VFR BLD AUTO: 38.3 % (ref 36–46)
HCV AB SER QL: NONREACTIVE
HCYS SERPL-SCNC: 33.36 UMOL/L (ref 5–13.9)
HDLC SERPL-MCNC: 51.8 MG/DL
HGB BLD-MCNC: 11.5 G/DL (ref 12–16)
HIV 1+2 AB+HIV1 P24 AG SERPL QL IA: NONREACTIVE
HLA CLASS I AB SCREEN,FC: NORMAL
HLA CLASS II AB SCREEN,FC: NORMAL
HLA CLS I TYP PNL BLD/T DONR HIGH RES: NORMAL
HLA RESULTS: NORMAL
HLA-DP2 QL: NORMAL
HLA-DQB1 HIGH RES: NORMAL
HLA-DRB1 HIGH RES: NORMAL
INR PPP: 1.1 (ref 0.9–1.1)
MCH RBC QN AUTO: 28.3 PG (ref 26–34)
MCHC RBC AUTO-ENTMCNC: 30 G/DL (ref 32–36)
MCV RBC AUTO: 94 FL (ref 80–100)
NON-HDL CHOLESTEROL: 102 MG/DL (ref 0–149)
NRBC BLD-RTO: 0 /100 WBCS (ref 0–0)
PHOSPHATE SERPL-MCNC: 3.4 MG/DL (ref 2.5–4.9)
PLATELET # BLD AUTO: 247 X10*3/UL (ref 150–450)
PROT SERPL-MCNC: 7.7 G/DL (ref 6.4–8.2)
PROTHROMBIN TIME: 12.4 SECONDS (ref 9.8–12.8)
RBC # BLD AUTO: 4.06 X10*6/UL (ref 4–5.2)
RH FACTOR (ANTIGEN D): NORMAL
TREPONEMA PALLIDUM IGG+IGM AB [PRESENCE] IN SERUM OR PLASMA BY IMMUNOASSAY: REACTIVE
VARICELLA ZOSTER IGG INDEX: >8 IA
VZV IGG SER QL IA: POSITIVE
WBC # BLD AUTO: 4.7 X10*3/UL (ref 4.4–11.3)

## 2024-03-29 PROCEDURE — 3077F SYST BP >= 140 MM HG: CPT | Performed by: INTERNAL MEDICINE

## 2024-03-29 PROCEDURE — 3077F SYST BP >= 140 MM HG: CPT | Performed by: STUDENT IN AN ORGANIZED HEALTH CARE EDUCATION/TRAINING PROGRAM

## 2024-03-29 PROCEDURE — 87389 HIV-1 AG W/HIV-1&-2 AB AG IA: CPT

## 2024-03-29 PROCEDURE — 86706 HEP B SURFACE ANTIBODY: CPT

## 2024-03-29 PROCEDURE — 36415 COLL VENOUS BLD VENIPUNCTURE: CPT

## 2024-03-29 PROCEDURE — 99214 OFFICE O/P EST MOD 30 MIN: CPT | Performed by: STUDENT IN AN ORGANIZED HEALTH CARE EDUCATION/TRAINING PROGRAM

## 2024-03-29 PROCEDURE — 85610 PROTHROMBIN TIME: CPT

## 2024-03-29 PROCEDURE — 1036F TOBACCO NON-USER: CPT | Performed by: INTERNAL MEDICINE

## 2024-03-29 PROCEDURE — 86787 VARICELLA-ZOSTER ANTIBODY: CPT

## 2024-03-29 PROCEDURE — 80076 HEPATIC FUNCTION PANEL: CPT

## 2024-03-29 PROCEDURE — 83718 ASSAY OF LIPOPROTEIN: CPT

## 2024-03-29 PROCEDURE — 86704 HEP B CORE ANTIBODY TOTAL: CPT

## 2024-03-29 PROCEDURE — 86318 IA INFECTIOUS AGENT ANTIBODY: CPT

## 2024-03-29 PROCEDURE — 3044F HG A1C LEVEL LT 7.0%: CPT | Performed by: INTERNAL MEDICINE

## 2024-03-29 PROCEDURE — 80307 DRUG TEST PRSMV CHEM ANLYZR: CPT

## 2024-03-29 PROCEDURE — 86663 EPSTEIN-BARR ANTIBODY: CPT

## 2024-03-29 PROCEDURE — 3078F DIAST BP <80 MM HG: CPT | Performed by: INTERNAL MEDICINE

## 2024-03-29 PROCEDURE — 86664 EPSTEIN-BARR NUCLEAR ANTIGEN: CPT

## 2024-03-29 PROCEDURE — 86665 EPSTEIN-BARR CAPSID VCA: CPT

## 2024-03-29 PROCEDURE — 4010F ACE/ARB THERAPY RXD/TAKEN: CPT | Performed by: INTERNAL MEDICINE

## 2024-03-29 PROCEDURE — 87340 HEPATITIS B SURFACE AG IA: CPT

## 2024-03-29 PROCEDURE — 3008F BODY MASS INDEX DOCD: CPT | Performed by: INTERNAL MEDICINE

## 2024-03-29 PROCEDURE — 86780 TREPONEMA PALLIDUM: CPT

## 2024-03-29 PROCEDURE — 99215 OFFICE O/P EST HI 40 MIN: CPT | Performed by: INTERNAL MEDICINE

## 2024-03-29 PROCEDURE — 86644 CMV ANTIBODY: CPT

## 2024-03-29 PROCEDURE — 86481 TB AG RESPONSE T-CELL SUSP: CPT

## 2024-03-29 PROCEDURE — 3048F LDL-C <100 MG/DL: CPT | Performed by: INTERNAL MEDICINE

## 2024-03-29 PROCEDURE — 80323 ALKALOIDS NOS: CPT

## 2024-03-29 PROCEDURE — 3044F HG A1C LEVEL LT 7.0%: CPT | Performed by: STUDENT IN AN ORGANIZED HEALTH CARE EDUCATION/TRAINING PROGRAM

## 2024-03-29 PROCEDURE — 86803 HEPATITIS C AB TEST: CPT

## 2024-03-29 PROCEDURE — 82465 ASSAY BLD/SERUM CHOLESTEROL: CPT

## 2024-03-29 PROCEDURE — 86832 HLA CLASS I HIGH DEFIN QUAL: CPT | Mod: OUT | Performed by: SURGERY

## 2024-03-29 PROCEDURE — 3078F DIAST BP <80 MM HG: CPT | Performed by: STUDENT IN AN ORGANIZED HEALTH CARE EDUCATION/TRAINING PROGRAM

## 2024-03-29 PROCEDURE — 82565 ASSAY OF CREATININE: CPT

## 2024-03-29 PROCEDURE — 86825 HLA X-MATH NON-CYTOTOXIC: CPT | Mod: OUT | Performed by: SURGERY

## 2024-03-29 PROCEDURE — 82150 ASSAY OF AMYLASE: CPT

## 2024-03-29 PROCEDURE — 86901 BLOOD TYPING SEROLOGIC RH(D): CPT

## 2024-03-29 PROCEDURE — 85027 COMPLETE CBC AUTOMATED: CPT

## 2024-03-29 PROCEDURE — 3048F LDL-C <100 MG/DL: CPT | Performed by: STUDENT IN AN ORGANIZED HEALTH CARE EDUCATION/TRAINING PROGRAM

## 2024-03-29 PROCEDURE — 84681 ASSAY OF C-PEPTIDE: CPT

## 2024-03-29 PROCEDURE — 83090 ASSAY OF HOMOCYSTEINE: CPT

## 2024-03-29 PROCEDURE — 84100 ASSAY OF PHOSPHORUS: CPT

## 2024-03-29 PROCEDURE — 83036 HEMOGLOBIN GLYCOSYLATED A1C: CPT

## 2024-03-29 PROCEDURE — 84520 ASSAY OF UREA NITROGEN: CPT

## 2024-03-29 PROCEDURE — 3008F BODY MASS INDEX DOCD: CPT | Performed by: STUDENT IN AN ORGANIZED HEALTH CARE EDUCATION/TRAINING PROGRAM

## 2024-03-29 PROCEDURE — 86900 BLOOD TYPING SEROLOGIC ABO: CPT

## 2024-03-29 PROCEDURE — 4010F ACE/ARB THERAPY RXD/TAKEN: CPT | Performed by: STUDENT IN AN ORGANIZED HEALTH CARE EDUCATION/TRAINING PROGRAM

## 2024-03-29 SDOH — ECONOMIC STABILITY: FOOD INSECURITY: WITHIN THE PAST 12 MONTHS, YOU WORRIED THAT YOUR FOOD WOULD RUN OUT BEFORE YOU GOT MONEY TO BUY MORE.: NEVER TRUE

## 2024-03-29 SDOH — ECONOMIC STABILITY: FOOD INSECURITY: WITHIN THE PAST 12 MONTHS, THE FOOD YOU BOUGHT JUST DIDN'T LAST AND YOU DIDN'T HAVE MONEY TO GET MORE.: NEVER TRUE

## 2024-03-29 ASSESSMENT — PATIENT HEALTH QUESTIONNAIRE - PHQ9
1. LITTLE INTEREST OR PLEASURE IN DOING THINGS: NOT AT ALL
2. FEELING DOWN, DEPRESSED OR HOPELESS: NOT AT ALL
SUM OF ALL RESPONSES TO PHQ9 QUESTIONS 1 & 2: 0

## 2024-03-29 ASSESSMENT — PAIN SCALES - GENERAL
PAINLEVEL: 0-NO PAIN
PAINLEVEL: 0-NO PAIN

## 2024-03-29 NOTE — PROGRESS NOTES
Chief Complaint: Patient presents for kidney transplant evaluation    History of Present Illness:  Stacey Heath  is a 53 y.o. female presents with ESRD from diabetes and hypertension.    She currently presented to clinic in wheelchair.  Does have significantly decreased mobility.  Encountered shortness of breath just going from clinic doors to exam room.  She recently saw pulmonology's 3/18/2024 for this shortness of breath.  Ordered CT scan of the chest, PFTs, and a sleep apnea study.  She is planning to follow-up with them in approximately 1 month.    History of smoking, however quit 2 years ago.    Had multiple admissions with fluid overload and missed dialysis.    Stress test: 12/2023 ([facility])  CONCLUSIONS:    1. SPECT Perfusion Study: Normal.    2. There is no scintigraphic evidence for inducible ischemia.    3. No evidence of scarred myocardium.    4. Left ventricle is normal in size. The left ventricle systolic   function is normal.    5. Right ventricle is normal in size. The right ventricle systolic   function is normal.    6. This is a low risk scan.     Gated Stress FBP   Gated Rest FBP    LVEF % 60               58         TTE: 6/2023  CONCLUSIONS:  1. Left ventricular systolic function is normal with a 55-60% estimated ejection fraction.  2. Moderately increased left ventricular septal thickness.  3. Spectral Doppler shows a pseudonormal pattern of left ventricular diastolic filling.  4. The left ventricular posterior wall thickness is moderately increased.  5. There is an elevated mean left atrial pressure.  6. There is severe concentric left ventricular hypertrophy.  7. The left atrium is moderately dilated.  8. There is moderate mitral annular calcification.    Hemodialysis: Current dialysis Tuesday Thursday Saturday.  Dry weight 52 kg.  Via right upper extremity AV fistula.   ROS: oliguric, no urinary retention/hematuria/recurrent UTIs.  Kidney stone requiring cystoscopy  removal  Disease Etiology:  diabetic nephropathy and hypertensive nephropathy.   Disease Complications:  congestive heart failure, dyslipidemia and hypertension.     Past Medical History:  Diabetes type 2  Hypertension  COPD  Asthma  Heart failure  End-stage renal disease  Retinopathy  Neuropathy    Past Surgical History:  Appendectomy  Tubal ligation  Cystoscopy and stone removal  Shoulder infection and washout bilateral  Pericardial drain    Review of Systems:  Cardiac: Denies chest pain, palpitations  : Makes about a cup of urine a day denies history of gross hematuria, nephrolithiasis, urinary retention, or recurrent UTIs.  Vascular: Denies personal or familial history of DVT/PE. No active claudication or non-healing LE wounds.  Functional Status: Can walk up 2 flights of stairs    Transfusions: 5 transfusions  Pregnancy: 4 prior pregnancies  Prior transplant: Not applicable    Family History:  No other family history of dialysis requirement    Physical Exam:  Gen: A+OX3; NAD  HEENT: PERRL, sclera anicteric, MMM  Cardiac: RRR  Chest: Normal inspiratory effort  Abdomen: S/NT/ND.  Ext: No LE edema  Vascular: 2+ palpable femoral pulses  Psychiatric: Normal mood, affect    Assessment/Plan:  - The patient is a marginal candidate for kidney transplantation given endorsed shortness of breath  - Routine age/gender based screening  - Poor functional status, recommend 6MWT to determine transplant candidacy  - Pulmonary function testing, chest CT and sleep apnea study with follow-up with pulmonology  -CT scan abdomen and pelvis    Transplant Education:    I had a discussion with this patient regarding 1 year graft and patient survival statistics following renal transplantation for both living and  donor allograft recipients. This data included [facility] data compared to National data readily available for review on https://www.SRTR.org. The patient also had attended the kidney transplant education  class provided by the transplant institute.     The difference between allograft function was discussed comparing living donor, KDPI 0-85%, and >85% kidneys.     Further discussion included:  -The transplant selection committee process.  -The need for lifelong immunosuppressive therapy, and the side effects of these medications including the risk of infections, cancer, and lymphoma.  -The wait list time approximately is 5 years or more for  donor transplants and the statistical superiority of a living donor.     -Using identified donors with risk criteria for transmission of infection  -The possibility of utilizing  donors with known HCV antibody and/or DAVID positivity and post-transplant treatment/surveillance protocol  -Potential transmission of infectious disease from any  donors, as well as living donors.   -The possibility of transmission of tumors and infections via the transplanted organ.  -The inability to completely test for all potential harmful tumors or infectious agents.  -The possibility of listing at multiple locations.     Surgical complications including need for reoperation(s) including but not limited to:  -Bleeding.  -Repair of leaks.  -Control of infection.  -Blood clots in the transplant vessels.  -Possible kidney transplant removal.     The medical complications including but not limited to:  -Death.  -Cardiac.  -Pulmonary.  -Infectious.  -Neurologic.  -Other Complications.     We also discussed how the kidney transplant could function:  -Non-function and possible kidney transplant removal within the first 3 to 6 months.  -Delayed graft function (dialysis needed after transplant).  -The potential of recurrence of kidney disease leading to kidney transplant graft loss.    Time Attestation:  I spent 60 minutes with the patient, over 50 minutes in counseling and education as outlined above.

## 2024-03-29 NOTE — PATIENT INSTRUCTIONS
Thank You for coming to [facility] [place].  You are currently in evaluation for kidney transplant.  In order to be eligible to be placed on the wait list you must complete certain tests and appointments.  The following tests/appointments will be scheduled for you:    CT cardiac score  CT abdomen and pelvis    Virtual finance consult  Cardiology consult  Dietician consult  Transplant psychologist consult    Please complete the following testing with your primary care doctor:    Pap smear  mammogram      Please call [phone] with any questions or if you need assistance.    Angelique SANTANAN, RN Transplant Coordinator

## 2024-03-29 NOTE — PROGRESS NOTES
TRANSPLANT NEPHROLOGY CONSULT :   KIDNEY TRANSPLANT RECIPIENT EVALUATION        SERVICE DATE: 03/29/2024     REASON FOR CONSULT/CHIEF COMPLAINT:    FOR KIDNEY TRANSPLANT RECIPIENT EVALUATION.    HPI:    Ms. Heath is a 53 y.o. female with past medical history significant for :    ESRD on HD Tu Th Sa (presumably secondary to T2DM and HTN),  EDW 52 Kg. Using RUE AVF.  Nephrologist = Dr. Garcia.     DM x 20+ YEARS; with retinopathy/neuropaty. Off insulin and meds.   HTN x 30+ YEARS  Hx of kidney stone a few years ago (cystoscopy was performed, stone was removed)  HFpEF ,   PVD  COPD / asthma seeing pulmonologist. CT chest, PFT ordered  Multiple admissions with fluid overlaod, missed HD.   S/p Appendectomy  S/P Cholecystectomy  Tubal ligation    BLOOD TYPE:  AB    Functional status :   Acceptable, no walking aids    Urine output :   The patient makes urine output approximately a cup per day.    Potential Donor :  none    Last GFR /Creatinine:   Lab Results   Component Value Date    CREATININE 7.51 (H) 03/01/2024    BUN 34 (H) 03/01/2024     03/01/2024    K 4.1 03/01/2024     03/01/2024    CO2 30 03/01/2024      CrCl cannot be calculated (Patient's most recent lab result is older than the maximum 7 days allowed.).     Lab Results   Component Value Date    WBC 6.3 03/01/2024    HGB 9.1 (L) 03/01/2024    HCT 29.1 (L) 03/01/2024    MCV 97 03/01/2024     03/01/2024         Hx of PRBC Transfusion  5    Pregnancy History  4    Recent Hospitalization/ED visit:  3/2024 for COVID and volume overload    Echocardiogram:  No results found for this or any previous visit from the past 1095 days.          The patient is here for kidney transplant recipient evaluation. Ms. Heath has had multiple complications from end stage severe renal disease including anemia, secondary hyperparathyroidism, and osteodystrophy. The patient is here today for an evaluation for kidney transplantation to improve quality of life  and decrease the risk of cardiovascular disease, coronary artery disease and stroke.     The patient is doing well without complaints. Denied chest pain, shortness of breath, palpitation, dyspnea on exertion, dysuria, fever, nausea, vomiting, diarrhea and flu-liked symptoms. No swelling of the extremities. No recent hospitalization or ED visit.      ROS:  Review of  14 systems was performed system by system. See HPI. Otherwise, the symptoms were negative.    PAST MEDICAL HISTORY:  Past Medical History:   Diagnosis Date    Acute pancreatitis 01/20/2024    Acute pyelonephritis due to bacteria 01/16/2023    Acute renal failure (CMS/[facility]) 02/25/2023    YARA (acute kidney injury) (CMS/[facility]) 08/03/2021    Bilateral shoulder pain 02/25/2023    Breast pain 05/18/2021    History of breast pain    Cardiac/pericardial tamponade 01/20/2024    Chronic renal failure syndrome 01/10/2022    Community acquired pneumonia 11/03/2021    Dependence on renal dialysis (CMS/HCC) 11/21/2022    Hemodialysis patient    Disorder of sweat glands 02/25/2023    Dry eye syndrome of bilateral lacrimal glands 03/07/2017    Dry eyes    Essential (primary) hypertension 12/27/2022    Hypertension    Flash pulmonary edema (CMS/[facility]) 01/20/2024    Comment on above: FLASH PULMONARY EDEMA J81.0    History of acute pancreatitis 12/21/2020    History of acute pancreatitis    Low grade squamous intraepithelial lesion (LGSIL) on cervicovaginal cytologic smear 02/25/2023    Migraine headache 02/25/2023    Comment on above: MIGRAINES    Migraines     History of migraine    Obstruction of urinary stent (CMS/[facility]) 02/25/2023    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    Other conditions influencing health status 09/01/2021    History of nephrostomy    Pain in upper limb 01/20/2024    Comment on above: ARM PAIN    Periorbital cellulitis 06/13/2014    Formatting of this note might be different from the original. Improved Afebrile No pain on EOM Can see clearly  from left eye PLAN PO augmentin as outpatient    Pleural effusion 12/12/2023    Pneumonia 04/11/2023    Sepsis (CMS/[facility]) 01/20/2024    Status migrainosus 08/21/2012    Type 2 myocardial infarction (CMS/[facility]) 01/20/2024    Unspecified diastolic (congestive) heart failure (CMS/[facility]) 11/21/2022    Heart failure with preserved left ventricular function (HFpEF)    Vaginal dryness 07/29/2022    Vaginal dryness        PAST SURGICAL HISTORY:  Past Surgical History:   Procedure Laterality Date    APPENDECTOMY  03/07/2017    Appendectomy    CT ABDOMEN ANGIOGRAM W AND/OR WO IV CONTRAST  4/23/2023    CT ABDOMEN ANGIOGRAM W AND/OR WO IV CONTRAST [facility] CT    IR VENOGRAM DIALYSIS  8/5/2021    IR VENOGRAM DIALYSIS 8/5/2021    OTHER SURGICAL HISTORY  03/07/2017    Cystoscopy With Pyeloscopy With Removal Of Calculus    OTHER SURGICAL HISTORY  03/07/2017    Anoscopy For Polyp Removal    OTHER SURGICAL HISTORY  12/08/2021    Arteriovenous fistula creation procedure    OTHER SURGICAL HISTORY  12/08/2021    Dialysis tunneled catheter placement    TUBAL LIGATION  03/07/2017    Tubal Ligation        SOCIAL HISTORY:  Social History     Socioeconomic History    Marital status: Single     Spouse name: Not on file    Number of children: Not on file    Years of education: Not on file    Highest education level: Not on file   Occupational History    Not on file   Tobacco Use    Smoking status: Former     Types: Cigarettes    Smokeless tobacco: Never   Vaping Use    Vaping Use: Never used   Substance and Sexual Activity    Alcohol use: Not Currently    Drug use: Never    Sexual activity: Not on file   Other Topics Concern    Not on file   Social History Narrative    Not on file     Social Determinants of Health     Financial Resource Strain: Low Risk  (3/1/2024)    Overall Financial Resource Strain (CARDIA)     Difficulty of Paying Living Expenses: Not very hard   Food Insecurity: No Food Insecurity (10/17/2023)    Hunger Vital Sign     Worried About  Running Out of Food in the Last Year: Never true     Ran Out of Food in the Last Year: Never true   Transportation Needs: No Transportation Needs (3/1/2024)    PRAPARE - Transportation     Lack of Transportation (Medical): No     Lack of Transportation (Non-Medical): No   Physical Activity: Insufficiently Active (10/17/2023)    Exercise Vital Sign     Days of Exercise per Week: 3 days     Minutes of Exercise per Session: 10 min   Stress: No Stress Concern Present (10/17/2023)    [employer] - Occupational Stress Questionnaire     Feeling of Stress : Only a little   Social Connections: Moderately Integrated (10/17/2023)    Social Connection and Isolation Panel [NHANES]     Frequency of Communication with Friends and Family: More than three times a week     Frequency of Social Gatherings with Friends and Family: Once a week     Attends Holiness Services: More than 4 times per year     Active Member of Clubs or Organizations: No     Attends Club or Organization Meetings: Not on file     Marital Status: Living with partner   Intimate Partner Violence: Not At Risk (10/17/2023)    Humiliation, Afraid, Rape, and Kick questionnaire     Fear of Current or Ex-Partner: No     Emotionally Abused: No     Physically Abused: No     Sexually Abused: No   Housing Stability: Low Risk  (3/1/2024)    Housing Stability Vital Sign     Unable to Pay for Housing in the Last Year: No     Number of Places Lived in the Last Year: 1     Unstable Housing in the Last Year: No       FAMILY HISTORY:  Family History   Problem Relation Name Age of Onset    Other (Cerebrovascular Accident) Father      Heart failure Paternal Grandmother      Breast cancer Paternal Grandmother      Other (Primary Cervical Cancer) Paternal Grandmother      Diabetes Paternal Grandfather         MEDICATION LIST:  Current Outpatient Medications   Medication Instructions    albuterol 1.25 mg, nebulization, Every 6 hours PRN    amLODIPine  "(Norvasc) 10 mg tablet TAKE 1 TABLET BY MOUTH ONCE DAILY BEFORE DIALYSIS    aspirin 81 mg, oral, Daily    atorvastatin (LIPITOR) 80 mg, oral, Nightly    carvedilol (COREG) 25 mg, oral, 2 times daily    cloNIDine (CATAPRES) 0.1 mg, oral, 2 times daily    epoetin joceline (EPOGEN,PROCRIT) 10,000 Units, subcutaneous, 3 times weekly    fluticasone (Flonase) 50 mcg/actuation nasal spray 2 sprays, Each Nostril, Daily, Shake gently. Before first use, prime pump. After use, clean tip and replace cap.    fluticasone furoate-vilanteroL (Breo Ellipta) 100-25 mcg/dose inhaler inhalation, Daily RT    fluticasone propion-salmeteroL (Advair Diskus) 100-50 mcg/dose diskus inhaler 1 puff, inhalation, Daily    gabapentin (NEURONTIN) 300 mg, oral, Nightly    hydrALAZINE (APRESOLINE) 50 mg, oral, 3 times daily    hydrOXYzine HCL (ATARAX) 10 mg, oral, Every 6 hours PRN    isosorbide mononitrate ER (IMDUR) 30 mg, oral, Daily, Do not crush or chew.    metoclopramide (Reglan) 5 mg tablet TAKE 1 TABLET BY MOUTH EVERY 6 HOURS AS NEEDED    multivitamin with minerals tablet 1 tablet, oral, Daily RT    ondansetron (ZOFRAN) 4 mg, oral, Every 8 hours PRN    pantoprazole (PROTONIX) 40 mg, oral, Daily, Do not crush, chew, or split.    sodium chloride (Ocean) 0.65 % nasal spray 1 spray, Each Nostril, 4 times daily PRN    sucralfate (CARAFATE) 1 g, oral, 2 times daily before meals    SUMAtriptan (IMITREX) 50 mg, oral, Once as needed    torsemide (Demadex) 20 mg tablet     valsartan (DIOVAN) 160 mg, oral, 2 times daily       ALLERGY  Allergies   Allergen Reactions    Iodine Hives, Itching and Unknown    Bioflavonoids Swelling    Codeine Itching, Hives and Unknown     Tolerates percocet   Tolerates percocet    Tolerates percocet    Flowers Itching    Hydromorphone Itching    Shellfish Containing Products Swelling     SEAFOOD       PHYSICAL EXAM:    Visit Vitals  /62   Pulse 83   Temp 36.2 °C (97.2 °F) (Temporal)   Ht 1.397 m (4' 7\")   Wt 51.6 kg (113 " lb 12.8 oz)   SpO2 93%   BMI 26.45 kg/m²   OB Status Postmenopausal   Smoking Status Former   BSA 1.42 m²        General Appearance - NAD, Good speech, oriented and alert  HEENT - Supple. Not pale. No jaundice. No cervical lymphadenopathy. Pharynx and tonsils are not injected.  CVS - RRR. Normal S1/S2. No murmur, click , rub or gallop  Lungs- clear to auscultation bilaterally  Abdomen - soft , not tender, no guarding, no rigidity. No hepatosplenomegaly. Normal bowel sounds. No masses and ascites. S/P Kidney transplant .  Transplanted kidney is not tender.   Musculoskeletal /Extremities - no edema. Full ROM. No joint tenderness.   Neuro/Psych - appropriate mood and affect. Motor power V/V all extremities. CN I -XII were grossly intact.  Skin - No visible rash  Dialysis access : RUE AVF is clean, dry and intact. No signs of infection.      LABS:    Lab Results   Component Value Date    WBC 6.3 03/01/2024    HGB 9.1 (L) 03/01/2024    HCT 29.1 (L) 03/01/2024     03/01/2024    CHOL 108 01/15/2024    TRIG 103 01/15/2024    HDL 39.4 01/15/2024    ALT 8 03/01/2024    AST 9 03/01/2024     03/01/2024    K 4.1 03/01/2024     03/01/2024    CREATININE 7.51 (H) 03/01/2024    BUN 34 (H) 03/01/2024    CO2 30 03/01/2024    TSH 2.32 01/16/2024    INR 1.2 (H) 01/16/2024    HGBA1C 4.2 01/16/2024     Stress test: 12/2023 ([facility])  CONCLUSIONS:    1. SPECT Perfusion Study: Normal.    2. There is no scintigraphic evidence for inducible ischemia.    3. No evidence of scarred myocardium.    4. Left ventricle is normal in size. The left ventricle systolic   function is normal.    5. Right ventricle is normal in size. The right ventricle systolic   function is normal.    6. This is a low risk scan.     Gated Stress FBP   Gated Rest FBP    LVEF % 60               58       TTE: 6/2023  CONCLUSIONS:  1. Left ventricular systolic function is normal with a 55-60% estimated ejection fraction.  2. Moderately increased left  ventricular septal thickness.  3. Spectral Doppler shows a pseudonormal pattern of left ventricular diastolic filling.  4. The left ventricular posterior wall thickness is moderately increased.  5. There is an elevated mean left atrial pressure.  6. There is severe concentric left ventricular hypertrophy.  7. The left atrium is moderately dilated.  8. There is moderate mitral annular calcification.    ASSESSMENT AND PLAN:    After completion of taking history and physical examination, the patient seems to be a  suitable candidate  marginal candidate to proceed with the rest of transplant evaluation.    However, patient will need the following tests to determine the eligibility for kidney transplant per [facility] kidney transplant evaluation guideline :    - Cardiac clearance  - sw to evaluate compliance due to hx of hospitalizations for missed HD  - HD compliance sheet  - PFT, CT Chest per her outpt pulmonologist  - CT annual ? - severe PVD/PAD per tx surgery 's rec.  -Update cancer screening per age/sex  - Will need to complete the rest of work up per protocol    =========================================================================    The case will be presented at the selection committee at the [facility] [place].  The final decision from the committee will be sent out to notify the patient/primary care physician/ nephrologist. The above recommendations were discussed with the patient at length.     In addition, the following were also discussed:    - Risks and benefits of transplantation, both short-term and long-term    - Risk of primary graft non-function, DGF, SGF, rejection, primary disease recurrence, return to dialysis    - Risks of immunosuppression including infections, CA, CV risk    - Need for compliance with medications and medical care in general      The patient expressed understanding of the above and wishes to proceed.  I answered all of his questions.  I urged the patient to look for living donors.    - I have spent over 60 minutes with the patient, reviewing medical record, lab result , CXR result and other specialty's notes. More than 50% of the time was spent in counseling, explaining about the transplantation and answering the questions. I also reviewed the medical record, blood test results, imaging and previous studies which were obtained from the nephrologists. I also order the tests needed to complete the evaluation and I will review the results of those tests.

## 2024-03-29 NOTE — PROGRESS NOTES
Patient attended in-person consent signing on 3/29/2024.  In-person education was completed prior to in-person consent signing.  Accompanied to class with her daughter.  Patient remained attentive throughout the review session, asking appropriate questions.  Evaluation consents were signed.     PRE-TRANSPLANT EDUCATION  Patient received education regarding the following topics as part of their pre-transplant evaluation:  The evaluation process, including:   Transplant team members and roles    Required consultations and testing   Selection criteria and suitability for transplant   Listing process and receiving an organ offer   Psychosocial and financial considerations for a successful transplant   Patient responsibilities, including the necessity of adhering to a strict medical regimen  An overview of the surgical procedure   Potential medical, surgical, and psychosocial risks to transplantation, including:   Wound infection   Pneumonia   Blood clot formation   Organ rejection, failure, and possibility of re-transplantation   Lifetime immunosuppression therapy and associated risks   Arrhythmias and cardiovascular collapse   Multi-organ system failure   Death   Depression   Post-Traumatic Stress Disorder   Generalized anxiety, issues of dependence, and feelings of guilt  Available alternatives to transplantation  Donor risk factors that could affect the success of the transplant and the health of the patient, including:   Donor age   Donor medical and social history   Condition of the organ   Risk of martínez cancer, HIV, Hepatitis B, Hepatitis C, or malaria if the infection is not detectable at the time of donation  Patient?s right to withdraw consent for transplantation at any time during the process  Transplants not performed in a Medicare-approved transplant center could affect the patient?s ability to have immunosuppression medication paid for under Medicare part B.   Multiple listing options.    Patient was  given the opportunity to have questions answered. Patient was provided a copy of the informed consent for transplant evaluation.    Signed evaluation informed consent received? Yes. 3/29/2024

## 2024-03-30 LAB — RPR SER QL: NONREACTIVE

## 2024-03-31 LAB
NIL(NEG) CONTROL SPOT COUNT: NORMAL
PANEL A SPOT COUNT: 1
PANEL B SPOT COUNT: 2
POS CONTROL SPOT COUNT: NORMAL
T-SPOT. TB INTERPRETATION: NEGATIVE

## 2024-04-01 ENCOUNTER — DOCUMENTATION (OUTPATIENT)
Dept: TRANSPLANT | Facility: HOSPITAL | Age: 54
End: 2024-04-01
Payer: COMMERCIAL

## 2024-04-01 DIAGNOSIS — Z01.818 PRE-TRANSPLANT EVALUATION FOR KIDNEY TRANSPLANT: ICD-10-CM

## 2024-04-01 LAB
AMPHETAMINES SERPL QL SCN: NEGATIVE NG/ML
ANNOTATION COMMENT IMP: NORMAL
BARBITURATES SERPL QL SCN: NEGATIVE NG/ML
BENZODIAZ SERPL QL SCN: NEGATIVE NG/ML
BUPRENORPHINE SERPL-MCNC: NEGATIVE NG/ML
CANNABINOIDS SERPL QL SCN: NEGATIVE NG/ML
COCAINE SERPL QL SCN: NEGATIVE NG/ML
FLOW AUTOCROSSMATCH: NORMAL
HLA RESULTS: NORMAL
METHADONE SERPL QL SCN: NEGATIVE NG/ML
METHAMPHET SERPL QL: NEGATIVE NG/ML
OPIATES SERPL QL SCN: NEGATIVE NG/ML
OXYCODONE SERPL QL: NEGATIVE NG/ML
PCP SERPL QL SCN: NEGATIVE NG/ML
T PALLIDUM AB SER QL AGGL: REACTIVE

## 2024-04-01 NOTE — PROGRESS NOTES
Eval Clinic Note:  Patient attended evaluation appts on 3/29/2024 with Dr. Esquivel and Dr. Alfaro  Medications and allergies reviewed with patient.  Patient presented to appointment with her daughter, Rohini.  Patient ambulated used a wheelchair.    History:  Patient has a history of DMT2, HTN, COPD, Asthma, HF  Dialysis: hemodialysis on T-Th-Sat, [facility], since unknown.  Testing completed recently: Chest x-ray, echocardiogram, Stress test  Testing needed for evaluation: CT cardiac score, CT abd/pel, colonoscopy, pap smear, mammogram  Listing Consent: KDPI >85%, DCD, Hep C, Hep B  Comments:  Provided patient with my contact info for questions and concerns.      LISTING EDUCATION    Patient educated regarding the following prior to placement on the transplant waiting list:   The patient?s medical condition, prognosis, and treatment plan.   The expectations and patient responsibilities while on the waiting list, including:   Keeping the transplant center informed of any changes in contact information or insurance coverage   Notifying the transplant center of any changes in medical status   Required testing and/or re-evaluation appointments while awaiting transplant   An overview of the surgical procedure, including potential risks and alternatives.   Information regarding what to expect during the inpatient admission and recovery period.   A discussion regarding organ offers and types of potential donors, including potential risks that may be associated with specific types of donors that could affect the success of the transplant or the health of the patient.  The right to refuse transplantation.     Patient was given the opportunity to have questions answered. Patient was provided a copy of the informed consent for transplant listing.    Education provided by:  Transplant Coordinator: Angelique Howell RN  Transplant Physician: Dr. Alfaro    Signed listing informed consent received? YES  Patient agrees to be  listed for the following:  KDPI > 85% [x]  Donors After Circulatory Death (DCD) [x]  Donors with a Positive Core Antibody for Hepatitis B [x]  Donors with Hepatitis C Virus to recipients with hepatitis C []  Donors with Hepatitis C Virus to Negative hepatitis C recipients [x]    Patient will be discussed at an upcoming selection committee to determine eligibility to be placed on the [facility] waiting list.   Consent Type: Consent 1 (Standard)

## 2024-04-02 PROBLEM — E44.0 MALNUTRITION OF MODERATE DEGREE (MULTI): Status: ACTIVE | Noted: 2024-04-02

## 2024-04-02 LAB
COTININE SERPL-MCNC: <5 NG/ML
NICOTINE SERPL-MCNC: <5 NG/ML

## 2024-04-02 RX ORDER — LACTOSE-REDUCED FOOD
118 LIQUID (ML) ORAL 2 TIMES DAILY
Qty: 21240 ML | Refills: 3 | Status: SHIPPED | OUTPATIENT
Start: 2024-04-02 | End: 2024-04-02 | Stop reason: SDUPTHER

## 2024-04-02 RX ORDER — LACTOSE-REDUCED FOOD
118 LIQUID (ML) ORAL 2 TIMES DAILY
Qty: 21240 ML | Refills: 3 | Status: SHIPPED | OUTPATIENT
Start: 2024-04-02 | End: 2025-04-02

## 2024-04-02 ASSESSMENT — ENCOUNTER SYMPTOMS
ABDOMINAL PAIN: 0
PSYCHIATRIC NEGATIVE: 1
HEMATOLOGIC/LYMPHATIC NEGATIVE: 1
PHOTOPHOBIA: 0
DIZZINESS: 0
ABDOMINAL DISTENTION: 0
CHEST TIGHTNESS: 0
VOMITING: 0
APPETITE CHANGE: 1
HEADACHES: 0
LIGHT-HEADEDNESS: 0
ALLERGIC/IMMUNOLOGIC NEGATIVE: 1
NUMBNESS: 0
SHORTNESS OF BREATH: 0
MUSCULOSKELETAL NEGATIVE: 1
FATIGUE: 0
WEAKNESS: 0
ENDOCRINE NEGATIVE: 1
BLOOD IN STOOL: 0

## 2024-04-02 NOTE — PROGRESS NOTES
I saw and evaluated the patient. I personally obtained the key and critical portions of the history and physical exam or was physically present for key and critical portions performed by the resident. I reviewed the resident's documentation and discussed the patient with the resident. I agree with the resident's medical decision making as documented in the note.    Geovany Motley MD

## 2024-04-03 ENCOUNTER — HOSPITAL ENCOUNTER (INPATIENT)
Facility: HOSPITAL | Age: 54
LOS: 11 days | Discharge: HOME | DRG: 304 | End: 2024-04-14
Attending: EMERGENCY MEDICINE | Admitting: INTERNAL MEDICINE
Payer: COMMERCIAL

## 2024-04-03 ENCOUNTER — CLINICAL SUPPORT (OUTPATIENT)
Dept: EMERGENCY MEDICINE | Facility: HOSPITAL | Age: 54
DRG: 304 | End: 2024-04-03
Payer: COMMERCIAL

## 2024-04-03 ENCOUNTER — APPOINTMENT (OUTPATIENT)
Dept: CARDIOLOGY | Facility: HOSPITAL | Age: 54
DRG: 304 | End: 2024-04-03
Payer: COMMERCIAL

## 2024-04-03 ENCOUNTER — APPOINTMENT (OUTPATIENT)
Dept: RADIOLOGY | Facility: HOSPITAL | Age: 54
DRG: 304 | End: 2024-04-03
Payer: COMMERCIAL

## 2024-04-03 DIAGNOSIS — I50.32 CHRONIC HEART FAILURE WITH PRESERVED EJECTION FRACTION (HFPEF) (MULTI): ICD-10-CM

## 2024-04-03 DIAGNOSIS — I35.9 NONRHEUMATIC AORTIC VALVE DISORDER, UNSPECIFIED: ICD-10-CM

## 2024-04-03 DIAGNOSIS — R06.02 SHORTNESS OF BREATH: Primary | ICD-10-CM

## 2024-04-03 DIAGNOSIS — I1A.0 RESISTANT HYPERTENSION: ICD-10-CM

## 2024-04-03 DIAGNOSIS — Z45.2 PICC (PERIPHERALLY INSERTED CENTRAL CATHETER) REMOVAL: ICD-10-CM

## 2024-04-03 DIAGNOSIS — M79.89 OTHER SPECIFIED SOFT TISSUE DISORDERS: ICD-10-CM

## 2024-04-03 DIAGNOSIS — M79.602 PAIN IN LEFT ARM: ICD-10-CM

## 2024-04-03 DIAGNOSIS — Z99.2 DIALYSIS PATIENT (CMS-HCC): ICD-10-CM

## 2024-04-03 DIAGNOSIS — I16.1 HYPERTENSIVE EMERGENCY: ICD-10-CM

## 2024-04-03 DIAGNOSIS — I25.10 CORONARY ARTERY DISEASE INVOLVING NATIVE CORONARY ARTERY OF NATIVE HEART WITHOUT ANGINA PECTORIS: ICD-10-CM

## 2024-04-03 DIAGNOSIS — I15.9 SECONDARY HYPERTENSION: ICD-10-CM

## 2024-04-03 DIAGNOSIS — I82.622 ACUTE DEEP VEIN THROMBOSIS (DVT) OF LEFT UPPER EXTREMITY (MULTI): ICD-10-CM

## 2024-04-03 DIAGNOSIS — I82.622 ACUTE DEEP VEIN THROMBOSIS (DVT) OF BRACHIAL VEIN OF LEFT UPPER EXTREMITY (MULTI): ICD-10-CM

## 2024-04-03 LAB
ALBUMIN SERPL BCP-MCNC: 3.7 G/DL (ref 3.4–5)
ALP SERPL-CCNC: 102 U/L (ref 33–110)
ALT SERPL W P-5'-P-CCNC: 8 U/L (ref 7–45)
ANION GAP BLDV CALCULATED.4IONS-SCNC: 4 MMOL/L (ref 10–25)
ANION GAP BLDV CALCULATED.4IONS-SCNC: 6 MMOL/L (ref 10–25)
ANION GAP SERPL CALC-SCNC: 13 MMOL/L (ref 10–20)
AORTIC VALVE PEAK VELOCITY: 1.85 M/S
AST SERPL W P-5'-P-CCNC: 14 U/L (ref 9–39)
ATRIAL RATE: 99 BPM
AV PEAK GRADIENT: 13.7 MMHG
AVA (PEAK VEL): 2.11 CM2
BASE EXCESS BLDV CALC-SCNC: 10.4 MMOL/L (ref -2–3)
BASE EXCESS BLDV CALC-SCNC: 7.3 MMOL/L (ref -2–3)
BASOPHILS # BLD AUTO: 0.06 X10*3/UL (ref 0–0.1)
BASOPHILS NFR BLD AUTO: 1.2 %
BILIRUB SERPL-MCNC: 0.5 MG/DL (ref 0–1.2)
BNP SERPL-MCNC: 2101 PG/ML (ref 0–99)
BODY TEMPERATURE: 37 DEGREES CELSIUS
BODY TEMPERATURE: 37 DEGREES CELSIUS
BUN SERPL-MCNC: 30 MG/DL (ref 6–23)
CA-I BLDV-SCNC: 1.24 MMOL/L (ref 1.1–1.33)
CA-I BLDV-SCNC: 1.27 MMOL/L (ref 1.1–1.33)
CALCIUM SERPL-MCNC: 9.3 MG/DL (ref 8.6–10.6)
CARDIAC TROPONIN I PNL SERPL HS: 40 NG/L (ref 0–34)
CHLORIDE BLDV-SCNC: 105 MMOL/L (ref 98–107)
CHLORIDE BLDV-SCNC: 106 MMOL/L (ref 98–107)
CHLORIDE SERPL-SCNC: 102 MMOL/L (ref 98–107)
CO2 SERPL-SCNC: 31 MMOL/L (ref 21–32)
CREAT SERPL-MCNC: 6.81 MG/DL (ref 0.5–1.05)
EGFRCR SERPLBLD CKD-EPI 2021: 7 ML/MIN/1.73M*2
EJECTION FRACTION APICAL 4 CHAMBER: 68.5
EOSINOPHIL # BLD AUTO: 0.39 X10*3/UL (ref 0–0.7)
EOSINOPHIL NFR BLD AUTO: 7.9 %
ERYTHROCYTE [DISTWIDTH] IN BLOOD BY AUTOMATED COUNT: 15.9 % (ref 11.5–14.5)
FLUAV RNA RESP QL NAA+PROBE: NOT DETECTED
FLUBV RNA RESP QL NAA+PROBE: NOT DETECTED
GLOBAL LONGITUDINAL STRAIN: -11.8 %
GLUCOSE BLDV-MCNC: 78 MG/DL (ref 74–99)
GLUCOSE BLDV-MCNC: 92 MG/DL (ref 74–99)
GLUCOSE SERPL-MCNC: 76 MG/DL (ref 74–99)
HCO3 BLDV-SCNC: 33 MMOL/L (ref 22–26)
HCO3 BLDV-SCNC: 36 MMOL/L (ref 22–26)
HCT VFR BLD AUTO: 29.1 % (ref 36–46)
HCT VFR BLD EST: 28 % (ref 36–46)
HCT VFR BLD EST: 29 % (ref 36–46)
HGB BLD-MCNC: 9.4 G/DL (ref 12–16)
HGB BLDV-MCNC: 9.2 G/DL (ref 12–16)
HGB BLDV-MCNC: 9.5 G/DL (ref 12–16)
IMM GRANULOCYTES # BLD AUTO: 0.04 X10*3/UL (ref 0–0.7)
IMM GRANULOCYTES NFR BLD AUTO: 0.8 % (ref 0–0.9)
INHALED O2 CONCENTRATION: 21 %
INHALED O2 CONCENTRATION: 21 %
LACTATE BLDV-SCNC: 0.7 MMOL/L (ref 0.4–2)
LACTATE BLDV-SCNC: 0.8 MMOL/L (ref 0.4–2)
LEFT ATRIUM VOLUME AREA LENGTH INDEX BSA: 42.7 ML/M2
LEFT VENTRICLE INTERNAL DIMENSION DIASTOLE: 4.6 CM (ref 3.5–6)
LEFT VENTRICULAR OUTFLOW TRACT DIAMETER: 1.9 CM
LV EJECTION FRACTION BIPLANE: 64 %
LYMPHOCYTES # BLD AUTO: 1.02 X10*3/UL (ref 1.2–4.8)
LYMPHOCYTES NFR BLD AUTO: 20.6 %
MAGNESIUM SERPL-MCNC: 2.36 MG/DL (ref 1.6–2.4)
MCH RBC QN AUTO: 28.7 PG (ref 26–34)
MCHC RBC AUTO-ENTMCNC: 32.3 G/DL (ref 32–36)
MCV RBC AUTO: 89 FL (ref 80–100)
MITRAL VALVE E/A RATIO: 1.27
MITRAL VALVE E/E' RATIO: 19.04
MONOCYTES # BLD AUTO: 0.38 X10*3/UL (ref 0.1–1)
MONOCYTES NFR BLD AUTO: 7.7 %
NEUTROPHILS # BLD AUTO: 3.07 X10*3/UL (ref 1.2–7.7)
NEUTROPHILS NFR BLD AUTO: 61.8 %
NRBC BLD-RTO: 0 /100 WBCS (ref 0–0)
OXYHGB MFR BLDV: 62.5 % (ref 45–75)
OXYHGB MFR BLDV: 72.2 % (ref 45–75)
P AXIS: 44 DEGREES
P OFFSET: 195 MS
P ONSET: 146 MS
PCO2 BLDV: 52 MM HG (ref 41–51)
PCO2 BLDV: 53 MM HG (ref 41–51)
PH BLDV: 7.41 PH (ref 7.33–7.43)
PH BLDV: 7.44 PH (ref 7.33–7.43)
PHOSPHATE SERPL-MCNC: 3.6 MG/DL (ref 2.5–4.9)
PLATELET # BLD AUTO: 214 X10*3/UL (ref 150–450)
PO2 BLDV: 42 MM HG (ref 35–45)
PO2 BLDV: 48 MM HG (ref 35–45)
POTASSIUM BLDV-SCNC: 3.4 MMOL/L (ref 3.5–5.3)
POTASSIUM BLDV-SCNC: 3.8 MMOL/L (ref 3.5–5.3)
POTASSIUM SERPL-SCNC: 3.7 MMOL/L (ref 3.5–5.3)
PR INTERVAL: 144 MS
PROT SERPL-MCNC: 6.6 G/DL (ref 6.4–8.2)
Q ONSET: 218 MS
QRS COUNT: 17 BEATS
QRS DURATION: 84 MS
QT INTERVAL: 348 MS
QTC CALCULATION(BAZETT): 446 MS
QTC FREDERICIA: 411 MS
R AXIS: 9 DEGREES
RBC # BLD AUTO: 3.27 X10*6/UL (ref 4–5.2)
RIGHT VENTRICLE FREE WALL PEAK S': 10.4 CM/S
RIGHT VENTRICLE PEAK SYSTOLIC PRESSURE: 52.3 MMHG
SAO2 % BLDV: 64 % (ref 45–75)
SAO2 % BLDV: 74 % (ref 45–75)
SODIUM BLDV-SCNC: 141 MMOL/L (ref 136–145)
SODIUM BLDV-SCNC: 142 MMOL/L (ref 136–145)
SODIUM SERPL-SCNC: 142 MMOL/L (ref 136–145)
T AXIS: 162 DEGREES
T OFFSET: 392 MS
TRICUSPID ANNULAR PLANE SYSTOLIC EXCURSION: 1.7 CM
VENTRICULAR RATE: 99 BPM
WBC # BLD AUTO: 5 X10*3/UL (ref 4.4–11.3)

## 2024-04-03 PROCEDURE — 96375 TX/PRO/DX INJ NEW DRUG ADDON: CPT | Mod: 59

## 2024-04-03 PROCEDURE — 2500000004 HC RX 250 GENERAL PHARMACY W/ HCPCS (ALT 636 FOR OP/ED): Performed by: NURSE PRACTITIONER

## 2024-04-03 PROCEDURE — 36415 COLL VENOUS BLD VENIPUNCTURE: CPT | Performed by: NURSE PRACTITIONER

## 2024-04-03 PROCEDURE — 93306 TTE W/DOPPLER COMPLETE: CPT | Performed by: INTERNAL MEDICINE

## 2024-04-03 PROCEDURE — 85025 COMPLETE CBC W/AUTO DIFF WBC: CPT | Performed by: NURSE PRACTITIONER

## 2024-04-03 PROCEDURE — 36620 INSERTION CATHETER ARTERY: CPT | Performed by: NURSE PRACTITIONER

## 2024-04-03 PROCEDURE — 96376 TX/PRO/DX INJ SAME DRUG ADON: CPT | Mod: 59

## 2024-04-03 PROCEDURE — 70450 CT HEAD/BRAIN W/O DYE: CPT

## 2024-04-03 PROCEDURE — 84132 ASSAY OF SERUM POTASSIUM: CPT | Performed by: NURSE PRACTITIONER

## 2024-04-03 PROCEDURE — 2500000004 HC RX 250 GENERAL PHARMACY W/ HCPCS (ALT 636 FOR OP/ED): Performed by: EMERGENCY MEDICINE

## 2024-04-03 PROCEDURE — 96374 THER/PROPH/DIAG INJ IV PUSH: CPT | Mod: 59

## 2024-04-03 PROCEDURE — 93321 DOPPLER ECHO F-UP/LMTD STD: CPT | Performed by: INTERNAL MEDICINE

## 2024-04-03 PROCEDURE — 2500000004 HC RX 250 GENERAL PHARMACY W/ HCPCS (ALT 636 FOR OP/ED): Performed by: STUDENT IN AN ORGANIZED HEALTH CARE EDUCATION/TRAINING PROGRAM

## 2024-04-03 PROCEDURE — 93306 TTE W/DOPPLER COMPLETE: CPT

## 2024-04-03 PROCEDURE — 93308 TTE F-UP OR LMTD: CPT | Performed by: EMERGENCY MEDICINE

## 2024-04-03 PROCEDURE — 83735 ASSAY OF MAGNESIUM: CPT | Performed by: NURSE PRACTITIONER

## 2024-04-03 PROCEDURE — 70450 CT HEAD/BRAIN W/O DYE: CPT | Performed by: RADIOLOGY

## 2024-04-03 PROCEDURE — 71045 X-RAY EXAM CHEST 1 VIEW: CPT | Performed by: RADIOLOGY

## 2024-04-03 PROCEDURE — 71045 X-RAY EXAM CHEST 1 VIEW: CPT

## 2024-04-03 PROCEDURE — 99291 CRITICAL CARE FIRST HOUR: CPT | Mod: 25 | Performed by: NURSE PRACTITIONER

## 2024-04-03 PROCEDURE — 93321 DOPPLER ECHO F-UP/LMTD STD: CPT

## 2024-04-03 PROCEDURE — 87502 INFLUENZA DNA AMP PROBE: CPT | Performed by: NURSE PRACTITIONER

## 2024-04-03 PROCEDURE — 93325 DOPPLER ECHO COLOR FLOW MAPG: CPT | Performed by: INTERNAL MEDICINE

## 2024-04-03 PROCEDURE — 99291 CRITICAL CARE FIRST HOUR: CPT | Performed by: NURSE PRACTITIONER

## 2024-04-03 PROCEDURE — 2020000001 HC ICU ROOM DAILY

## 2024-04-03 PROCEDURE — 93005 ELECTROCARDIOGRAM TRACING: CPT

## 2024-04-03 PROCEDURE — 84100 ASSAY OF PHOSPHORUS: CPT | Performed by: NURSE PRACTITIONER

## 2024-04-03 PROCEDURE — 93308 TTE F-UP OR LMTD: CPT | Performed by: INTERNAL MEDICINE

## 2024-04-03 PROCEDURE — 84484 ASSAY OF TROPONIN QUANT: CPT | Performed by: NURSE PRACTITIONER

## 2024-04-03 PROCEDURE — 93010 ELECTROCARDIOGRAM REPORT: CPT | Performed by: NURSE PRACTITIONER

## 2024-04-03 PROCEDURE — 99291 CRITICAL CARE FIRST HOUR: CPT | Performed by: STUDENT IN AN ORGANIZED HEALTH CARE EDUCATION/TRAINING PROGRAM

## 2024-04-03 PROCEDURE — 83880 ASSAY OF NATRIURETIC PEPTIDE: CPT | Performed by: NURSE PRACTITIONER

## 2024-04-03 PROCEDURE — 93356 MYOCRD STRAIN IMG SPCKL TRCK: CPT | Performed by: INTERNAL MEDICINE

## 2024-04-03 RX ORDER — NITROGLYCERIN 20 MG/100ML
0-200 INJECTION INTRAVENOUS CONTINUOUS
Status: DISCONTINUED | OUTPATIENT
Start: 2024-04-03 | End: 2024-04-04

## 2024-04-03 RX ORDER — HEPARIN SODIUM 5000 [USP'U]/ML
5000 INJECTION, SOLUTION INTRAVENOUS; SUBCUTANEOUS EVERY 8 HOURS SCHEDULED
Status: DISCONTINUED | OUTPATIENT
Start: 2024-04-03 | End: 2024-04-12

## 2024-04-03 RX ORDER — DIPHENHYDRAMINE HYDROCHLORIDE 50 MG/ML
50 INJECTION INTRAMUSCULAR; INTRAVENOUS ONCE
Status: DISCONTINUED | OUTPATIENT
Start: 2024-04-04 | End: 2024-04-04

## 2024-04-03 RX ORDER — ACETAMINOPHEN 325 MG/1
975 TABLET ORAL ONCE
Status: COMPLETED | OUTPATIENT
Start: 2024-04-03 | End: 2024-04-03

## 2024-04-03 RX ORDER — LABETALOL HYDROCHLORIDE 5 MG/ML
20 INJECTION, SOLUTION INTRAVENOUS ONCE
Status: COMPLETED | OUTPATIENT
Start: 2024-04-03 | End: 2024-04-03

## 2024-04-03 RX ORDER — HYDRALAZINE HYDROCHLORIDE 20 MG/ML
10 INJECTION INTRAMUSCULAR; INTRAVENOUS ONCE
Status: COMPLETED | OUTPATIENT
Start: 2024-04-03 | End: 2024-04-03

## 2024-04-03 RX ORDER — MORPHINE SULFATE 4 MG/ML
4 INJECTION INTRAVENOUS ONCE
Status: COMPLETED | OUTPATIENT
Start: 2024-04-03 | End: 2024-04-03

## 2024-04-03 RX ADMIN — ACETAMINOPHEN 975 MG: 325 TABLET ORAL at 12:55

## 2024-04-03 RX ADMIN — HYDRALAZINE HYDROCHLORIDE 10 MG: 20 INJECTION INTRAMUSCULAR; INTRAVENOUS at 12:20

## 2024-04-03 RX ADMIN — HYDRALAZINE HYDROCHLORIDE 10 MG: 20 INJECTION INTRAMUSCULAR; INTRAVENOUS at 18:49

## 2024-04-03 RX ADMIN — METHYLPREDNISOLONE SODIUM SUCCINATE 40 MG: 125 INJECTION, POWDER, FOR SOLUTION INTRAMUSCULAR; INTRAVENOUS at 22:45

## 2024-04-03 RX ADMIN — LABETALOL HYDROCHLORIDE 20 MG: 5 INJECTION, SOLUTION INTRAVENOUS at 13:35

## 2024-04-03 RX ADMIN — NITROGLYCERIN 5 MCG/MIN: 20 INJECTION INTRAVENOUS at 21:42

## 2024-04-03 RX ADMIN — HEPARIN SODIUM 5000 UNITS: 5000 INJECTION INTRAVENOUS; SUBCUTANEOUS at 22:20

## 2024-04-03 RX ADMIN — MORPHINE SULFATE 4 MG: 4 INJECTION, SOLUTION INTRAMUSCULAR; INTRAVENOUS at 22:20

## 2024-04-03 ASSESSMENT — PAIN SCALES - GENERAL
PAINLEVEL_OUTOF10: 5 - MODERATE PAIN
PAINLEVEL_OUTOF10: 0 - NO PAIN
PAINLEVEL_OUTOF10: 7

## 2024-04-03 ASSESSMENT — PAIN - FUNCTIONAL ASSESSMENT
PAIN_FUNCTIONAL_ASSESSMENT: 0-10
PAIN_FUNCTIONAL_ASSESSMENT: 0-10

## 2024-04-03 NOTE — ED PROVIDER NOTES
HPI   Chief Complaint   Patient presents with    Shortness of Breath         History provided by:  Patient   used: No      53-year-old female with past medical history pertinent for end-stage renal disease on dialysis Tuesday Thursday Saturday who had a complete session yesterday and was feeling at her baseline presents for evaluation of shortness of breath upon awakening.  Patient states that she does wear 3 L nasal cannula as needed when she feels this way.  She states that she additionally has a headache and took her daily medications this morning prior to coming in.  She states that when she is fluid overloaded she usually swells in her face and not in her feet/lower extremities.  She additionally has been having nonbloody diarrhea for the past week.  She states that she makes a small amount of urine daily and gets dialysis through a right upper arm AV fistula.  She has taken no additional medications at home for her symptoms.  She denies any trauma, fall, injury or anticoagulation use.  She states that she did have COVID approximately 1.5 to 2 months ago and was hospitalized for this.  She denies any fevers, chills, chest pain, nausea, vomiting or abdominal pain.  Denies any additional symptoms or complaints at this time.  Denies any known sick contacts but does go to the dialysis center.     ROS is otherwise negative unless stated above.             No data recorded                   Patient History   Past Medical History:   Diagnosis Date    Acute pancreatitis 01/20/2024    Acute pyelonephritis due to bacteria 01/16/2023    Acute renal failure (CMS/[facility]) 02/25/2023    YAAR (acute kidney injury) (CMS/[facility]) 08/03/2021    Bilateral shoulder pain 02/25/2023    Breast pain 05/18/2021    History of breast pain    Cardiac/pericardial tamponade 01/20/2024    Chronic renal failure syndrome 01/10/2022    Community acquired pneumonia 11/03/2021    Dependence on renal dialysis (CMS/[facility]) 11/21/2022     Hemodialysis patient    Disorder of sweat glands 02/25/2023    Dry eye syndrome of bilateral lacrimal glands 03/07/2017    Dry eyes    Essential (primary) hypertension 12/27/2022    Hypertension    Flash pulmonary edema (CMS/[facility]) 01/20/2024    Comment on above: FLASH PULMONARY EDEMA J81.0    History of acute pancreatitis 12/21/2020    History of acute pancreatitis    Low grade squamous intraepithelial lesion (LGSIL) on cervicovaginal cytologic smear 02/25/2023    Migraine headache 02/25/2023    Comment on above: MIGRAINES    Migraines     History of migraine    Obstruction of urinary stent (CMS/[facility]) 02/25/2023    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    Other conditions influencing health status 09/01/2021    History of nephrostomy    Pain in upper limb 01/20/2024    Comment on above: ARM PAIN    Periorbital cellulitis 06/13/2014    Formatting of this note might be different from the original. Improved Afebrile No pain on EOM Can see clearly from left eye PLAN PO augmentin as outpatient    Pleural effusion 12/12/2023    Pneumonia 04/11/2023    Sepsis (CMS/[facility]) 01/20/2024    Status migrainosus 08/21/2012    Type 2 myocardial infarction (CMS/[facility]) 01/20/2024    Unspecified diastolic (congestive) heart failure (CMS/[facility]) 11/21/2022    Heart failure with preserved left ventricular function (HFpEF)    Vaginal dryness 07/29/2022    Vaginal dryness     Past Surgical History:   Procedure Laterality Date    APPENDECTOMY  03/07/2017    Appendectomy    CT ABDOMEN ANGIOGRAM W AND/OR WO IV CONTRAST  4/23/2023    CT ABDOMEN ANGIOGRAM W AND/OR WO IV CONTRAST [facility] CT    IR VENOGRAM DIALYSIS  8/5/2021    IR VENOGRAM DIALYSIS 8/5/2021    OTHER SURGICAL HISTORY  03/07/2017    Cystoscopy With Pyeloscopy With Removal Of Calculus    OTHER SURGICAL HISTORY  03/07/2017    Anoscopy For Polyp Removal    OTHER SURGICAL HISTORY  12/08/2021    Arteriovenous fistula creation procedure    OTHER SURGICAL HISTORY  12/08/2021    Dialysis  tunneled catheter placement    TUBAL LIGATION  03/07/2017    Tubal Ligation     Family History   Problem Relation Name Age of Onset    Other (Cerebrovascular Accident) Father      Heart failure Paternal Grandmother      Breast cancer Paternal Grandmother      Other (Primary Cervical Cancer) Paternal Grandmother      Diabetes Paternal Grandfather       Social History     Tobacco Use    Smoking status: Former     Types: Cigarettes    Smokeless tobacco: Never   Vaping Use    Vaping Use: Never used   Substance Use Topics    Alcohol use: Not Currently    Drug use: Never       Physical Exam   ED Triage Vitals [04/03/24 1108]   Temperature Heart Rate Respirations BP   36.9 °C (98.4 °F) 100 18 (!) 238/99      Pulse Ox Temp Source Heart Rate Source Patient Position   (!) 91 % Temporal -- --      BP Location FiO2 (%)     -- --       Physical Exam    VS: As documented in the triage note and EMR flowsheet from this visit were reviewed.    GEN: NAD, nontoxic, chronically ill appearing, resting comfortably in ED cart without difficulty or dyspnea  EYES:  EOMs grossly intact, anicteric sclera, no nystagmus noted, clear and equal bilaterally, no foreign body noted  HEENT: Airway patent, ears with clear tympanic membranes bilaterally. Nasal mucosa clear. Mouth with normal mucosa.  No area of abscess or fluctuance noted.  No drainage noted. Throat has no vesicles, no oropharyngeal exudates and uvula is midline. Face with no lymph node enlargement. No trismus or drooling noted.  Handling secretions.  Speech clear.  CARD: RRR, nontender chest, no crepitus deformities, no JVD, no rubs or gallops ; + murmur, bilateral lower extremity nonpitting edema noted.  Positive pulses bilaterally throughout.  Capillary refill less than 3 seconds.  No abnormal redness, warmth, tenderness or swelling noted to bilateral lower extremities.  PULMONARY: Diminished crackles in the bases.  Moving air well, Nonlabored, no accessory muscle use, able to speak  complete sentences  ABDOMEN: Abdomen soft, non-distended, no rebound, no guarding. Bowel sounds normal in all 4 quadrants. No tenderness to palpation.  No CVA tenderness.  No masses or organomegaly noted.  No evidence of peritonitis.   : deferred  MUSK: Spine appears normal, range of motion is not limited, no muscle or joint tenderness. Strength 5 out of 5 equal bilaterally throughout.  Generalized weakness noted.  No step-offs, deformities or additional signs of trauma noted.  No spinal/midline tenderness to palpation  SKIN: Skin normal color for race, warm, dry and intact. No evidence of trauma. No rash noted.  Right upper arm AV fistula positive bruit and thrill.  NEURO: Alert and oriented x 3, speech is clear, no obvious deficits noted. No facial droop noted.   PSYCH: Alert and oriented to person, place, time/situation. normal mood and affect. No apparent risk to self or others. Thoughts are linear.  Does not appear decompensated.  Does not appear internally stimulated.  LYMPH: No adenopathy or splenomegaly. No cervical, supraclavicular or inguinal lymphadenopathy.    ED Course & MDM   ED Course as of 04/03/24 2213 Wed Apr 03, 2024   1524 AST: 14 [CM]      ED Course User Index  [CM] Geoff Gleason MD         Diagnoses as of 04/03/24 2213   Shortness of breath   Secondary hypertension   Dialysis patient (CMS/[facility])       Medical Decision Making    Upon assessment patient is a chronically ill, nontoxic-appearing female in no apparent distress.  She is resting comfortably in the ED cart without difficulty or dyspnea.  She was placed on continuous cardiac monitoring pulse oximetry.  She is saturating at 100% on supplemental oxygen of 3 L nasal cannula.  She has a noted blood pressure in the 200s systolic over 90s diastolic.  She is in no respiratory distress and there is no indication for BiPAP at this time as she has no evidence of flash pulmonary edema.  There are no focal neurodeficits noted.  Plan is to  obtain imaging and laboratory studies along with a influenza swab.  She is out of the window for repeat COVID testing at this time.     My ED attending did perform a point-of-care ultrasound which was concerning for aortic versus mitral valve regurgitation.  This is likely the cause of her blood pressure and shortness of breath without evidence of flash pulmonary edema.  She is given a dose of hydralazine IV and a formal echocardiogram is ordered.  Additionally she is given Tylenol for her headache.    Her blood pressure remained elevated in the 200s over 90s and heart rate was low 110s therefore I gave her a dose of labetalol IV in addition.    Workup results were reviewed. unfortunately patient's blood pressures remained elevated and she was unable to come off her supplemental oxygenation without extreme shortness of breath.  Therefore she was admitted to the ICU under attending Dr. Adler for further management.  Attending physician did request a CT angio to rule out aortic dissection which I ordered but due to the patient's contrast allergy this will be performed without contrast per radiologist.  Additionally she requested a nitroglycerin drip for blood pressure control.  A arterial line was placed without difficulty by myself for invasive blood pressure management.  She remained otherwise hemodynamically stable throughout my ED course of care.  Findings and plan were discussed the patient she is agreeable for plan admit to hospital for further management and acknowledged understanding.  All questions and concerns were addressed.    EKG Interpretation: Normal sinus rhythm with LVH at a rate of 99, , QRS 84, QTc 446 without evidence of STEMI or ischemia.  No changes noted from prior.    Differential Diagnoses Considered: Hypertensive crisis/urgency, fluid overload, CHF exacerbation, electrolyte abnormality, influenza, need for dialysis, pneumonia, asthma exacerbation    Chronic Medical Conditions  Significantly Affecting Care: None    External Records Reviewed: I reviewed recent and relevant outside records including: PCP notes, prior discharge summary, previous radiologic studies, HIE.  Dry weight 52 kg.  Last ejection fraction 55 to 60%.    Independent Interpretation of Studies:  I independently interpreted: Chest x-ray which revealed no acute cardiopulmonary process    Escalation of Care:  Admitted to CICU    Social Determinants of Health Significantly Affecting Care:  None    Prescription Drug Consideration: N/A    Diagnostic testing considered: Considered a CT PE/D-dimer/CTA but as this is likely her congestive heart failure versus disorder with a reassuring assessment we have low suspicion that these need to be performed    Discussion of Management with Other Providers:   I discussed the patient/results with: admitting team, radiologist      This patient was staffed with ED Attending Dr. Gleason to review the plan of care during ED course.    *Please note that portions of this note may have been completed with a voice recognition program.  Efforts were made to edit the dictations but occasionally, words are mis-transcribed.      Procedure  Procedures     LILLY Rodríguez-TRUMAN  04/03/24 5998

## 2024-04-03 NOTE — ED PROCEDURE NOTE
Procedure    Performed by: Geoff Gleason MD  Authorized by: LILLY Rodríguez-CNP                Respiratory Indications: shortness of breath  Procedure: Cardiac Ultrasound    Findings:   Views: parasternal long, parasternal short and apical four  The pericardial space was visualized and was NEGATIVE for a significant pericardial effusion.  Activity: Ventricular contractions were visualized.  LV: LV systolic function was NORMAL.  RV: RV size was NORMAL.    Impression:  Cardiac: The focused cardiac ultrasound exam had ABNORMAL findings as specified.    Comments: Concern for AI/MR: holodiastolic murmur, signs of regurg on color doppler, wide pulse pressure             Geoff Gleason MD  04/03/24 0897

## 2024-04-03 NOTE — ED TRIAGE NOTES
ESRD on HD Tues Thurs Sat, went for full session yesterday, c/o SOB and feeling fluid-overloaded.

## 2024-04-04 ENCOUNTER — APPOINTMENT (OUTPATIENT)
Dept: DIALYSIS | Facility: HOSPITAL | Age: 54
End: 2024-04-04
Payer: COMMERCIAL

## 2024-04-04 ENCOUNTER — APPOINTMENT (OUTPATIENT)
Dept: NEPHROLOGY | Facility: CLINIC | Age: 54
End: 2024-04-04
Payer: COMMERCIAL

## 2024-04-04 ENCOUNTER — APPOINTMENT (OUTPATIENT)
Dept: RADIOLOGY | Facility: HOSPITAL | Age: 54
DRG: 304 | End: 2024-04-04
Payer: COMMERCIAL

## 2024-04-04 ENCOUNTER — DOCUMENTATION (OUTPATIENT)
Dept: BEHAVIORAL HEALTH | Facility: CLINIC | Age: 54
End: 2024-04-04

## 2024-04-04 LAB
ABO GROUP (TYPE) IN BLOOD: NORMAL
ALBUMIN SERPL BCP-MCNC: 3.6 G/DL (ref 3.4–5)
ANION GAP SERPL CALC-SCNC: 16 MMOL/L (ref 10–20)
ANTIBODY SCREEN: NORMAL
APTT PPP: 35 SECONDS (ref 27–38)
BASOPHILS # BLD AUTO: 0.02 X10*3/UL (ref 0–0.1)
BASOPHILS NFR BLD AUTO: 0.4 %
BUN SERPL-MCNC: 36 MG/DL (ref 6–23)
CALCIUM SERPL-MCNC: 9.3 MG/DL (ref 8.6–10.6)
CHLORIDE SERPL-SCNC: 103 MMOL/L (ref 98–107)
CO2 SERPL-SCNC: 25 MMOL/L (ref 21–32)
CREAT SERPL-MCNC: 8.06 MG/DL (ref 0.5–1.05)
EGFRCR SERPLBLD CKD-EPI 2021: 6 ML/MIN/1.73M*2
EOSINOPHIL # BLD AUTO: 0.04 X10*3/UL (ref 0–0.7)
EOSINOPHIL NFR BLD AUTO: 0.8 %
ERYTHROCYTE [DISTWIDTH] IN BLOOD BY AUTOMATED COUNT: 15.9 % (ref 11.5–14.5)
GLUCOSE BLD MANUAL STRIP-MCNC: 159 MG/DL (ref 74–99)
GLUCOSE BLD MANUAL STRIP-MCNC: 203 MG/DL (ref 74–99)
GLUCOSE BLD MANUAL STRIP-MCNC: 230 MG/DL (ref 74–99)
GLUCOSE SERPL-MCNC: 151 MG/DL (ref 74–99)
HCT VFR BLD AUTO: 27 % (ref 36–46)
HGB BLD-MCNC: 8.9 G/DL (ref 12–16)
IMM GRANULOCYTES # BLD AUTO: 0.02 X10*3/UL (ref 0–0.7)
IMM GRANULOCYTES NFR BLD AUTO: 0.4 % (ref 0–0.9)
INR PPP: 1.1 (ref 0.9–1.1)
LEFT VENTRICLE INTERNAL DIMENSION DIASTOLE: 4.6 CM (ref 3.5–6)
LEFT VENTRICULAR OUTFLOW TRACT DIAMETER: 1.6 CM
LYMPHOCYTES # BLD AUTO: 0.42 X10*3/UL (ref 1.2–4.8)
LYMPHOCYTES NFR BLD AUTO: 8.7 %
MAGNESIUM SERPL-MCNC: 2.26 MG/DL (ref 1.6–2.4)
MCH RBC QN AUTO: 28.5 PG (ref 26–34)
MCHC RBC AUTO-ENTMCNC: 33 G/DL (ref 32–36)
MCV RBC AUTO: 87 FL (ref 80–100)
MITRAL VALVE E/A RATIO: 1.17
MONOCYTES # BLD AUTO: 0.09 X10*3/UL (ref 0.1–1)
MONOCYTES NFR BLD AUTO: 1.9 %
NEUTROPHILS # BLD AUTO: 4.25 X10*3/UL (ref 1.2–7.7)
NEUTROPHILS NFR BLD AUTO: 87.8 %
NRBC BLD-RTO: 0 /100 WBCS (ref 0–0)
PHOSPHATE SERPL-MCNC: 4.2 MG/DL (ref 2.5–4.9)
PLATELET # BLD AUTO: 188 X10*3/UL (ref 150–450)
POTASSIUM SERPL-SCNC: 4.3 MMOL/L (ref 3.5–5.3)
PROTHROMBIN TIME: 12.5 SECONDS (ref 9.8–12.8)
RBC # BLD AUTO: 3.12 X10*6/UL (ref 4–5.2)
RH FACTOR (ANTIGEN D): NORMAL
RIGHT VENTRICLE PEAK SYSTOLIC PRESSURE: 36.4 MMHG
SODIUM SERPL-SCNC: 140 MMOL/L (ref 136–145)
WBC # BLD AUTO: 4.8 X10*3/UL (ref 4.4–11.3)

## 2024-04-04 PROCEDURE — 71275 CT ANGIOGRAPHY CHEST: CPT

## 2024-04-04 PROCEDURE — 37799 UNLISTED PX VASCULAR SURGERY: CPT | Performed by: STUDENT IN AN ORGANIZED HEALTH CARE EDUCATION/TRAINING PROGRAM

## 2024-04-04 PROCEDURE — 85025 COMPLETE CBC W/AUTO DIFF WBC: CPT | Performed by: STUDENT IN AN ORGANIZED HEALTH CARE EDUCATION/TRAINING PROGRAM

## 2024-04-04 PROCEDURE — 71275 CT ANGIOGRAPHY CHEST: CPT | Performed by: RADIOLOGY

## 2024-04-04 PROCEDURE — 2500000001 HC RX 250 WO HCPCS SELF ADMINISTERED DRUGS (ALT 637 FOR MEDICARE OP): Performed by: STUDENT IN AN ORGANIZED HEALTH CARE EDUCATION/TRAINING PROGRAM

## 2024-04-04 PROCEDURE — 99222 1ST HOSP IP/OBS MODERATE 55: CPT | Performed by: STUDENT IN AN ORGANIZED HEALTH CARE EDUCATION/TRAINING PROGRAM

## 2024-04-04 PROCEDURE — 85610 PROTHROMBIN TIME: CPT | Performed by: STUDENT IN AN ORGANIZED HEALTH CARE EDUCATION/TRAINING PROGRAM

## 2024-04-04 PROCEDURE — 2500000001 HC RX 250 WO HCPCS SELF ADMINISTERED DRUGS (ALT 637 FOR MEDICARE OP)

## 2024-04-04 PROCEDURE — 83735 ASSAY OF MAGNESIUM: CPT | Performed by: STUDENT IN AN ORGANIZED HEALTH CARE EDUCATION/TRAINING PROGRAM

## 2024-04-04 PROCEDURE — 80069 RENAL FUNCTION PANEL: CPT | Performed by: STUDENT IN AN ORGANIZED HEALTH CARE EDUCATION/TRAINING PROGRAM

## 2024-04-04 PROCEDURE — 2500000004 HC RX 250 GENERAL PHARMACY W/ HCPCS (ALT 636 FOR OP/ED)

## 2024-04-04 PROCEDURE — 2500000004 HC RX 250 GENERAL PHARMACY W/ HCPCS (ALT 636 FOR OP/ED): Performed by: STUDENT IN AN ORGANIZED HEALTH CARE EDUCATION/TRAINING PROGRAM

## 2024-04-04 PROCEDURE — 2500000002 HC RX 250 W HCPCS SELF ADMINISTERED DRUGS (ALT 637 FOR MEDICARE OP, ALT 636 FOR OP/ED): Mod: MUE | Performed by: STUDENT IN AN ORGANIZED HEALTH CARE EDUCATION/TRAINING PROGRAM

## 2024-04-04 PROCEDURE — 2550000001 HC RX 255 CONTRASTS: Performed by: INTERNAL MEDICINE

## 2024-04-04 PROCEDURE — 2500000002 HC RX 250 W HCPCS SELF ADMINISTERED DRUGS (ALT 637 FOR MEDICARE OP, ALT 636 FOR OP/ED): Performed by: STUDENT IN AN ORGANIZED HEALTH CARE EDUCATION/TRAINING PROGRAM

## 2024-04-04 PROCEDURE — 5A1D70Z PERFORMANCE OF URINARY FILTRATION, INTERMITTENT, LESS THAN 6 HOURS PER DAY: ICD-10-PCS | Performed by: INTERNAL MEDICINE

## 2024-04-04 PROCEDURE — 82947 ASSAY GLUCOSE BLOOD QUANT: CPT

## 2024-04-04 PROCEDURE — 86901 BLOOD TYPING SEROLOGIC RH(D): CPT | Performed by: STUDENT IN AN ORGANIZED HEALTH CARE EDUCATION/TRAINING PROGRAM

## 2024-04-04 PROCEDURE — 8010000001 HC DIALYSIS - HEMODIALYSIS PER DAY

## 2024-04-04 PROCEDURE — 74174 CTA ABD&PLVS W/CONTRAST: CPT | Performed by: RADIOLOGY

## 2024-04-04 PROCEDURE — 2020000001 HC ICU ROOM DAILY

## 2024-04-04 RX ORDER — DIPHENHYDRAMINE HYDROCHLORIDE 50 MG/ML
50 INJECTION INTRAMUSCULAR; INTRAVENOUS ONCE
Status: COMPLETED | OUTPATIENT
Start: 2024-04-04 | End: 2024-04-04

## 2024-04-04 RX ORDER — AMLODIPINE BESYLATE 10 MG/1
10 TABLET ORAL DAILY
Status: DISCONTINUED | OUTPATIENT
Start: 2024-04-04 | End: 2024-04-05

## 2024-04-04 RX ORDER — ATORVASTATIN CALCIUM 80 MG/1
80 TABLET, FILM COATED ORAL NIGHTLY
Status: DISCONTINUED | OUTPATIENT
Start: 2024-04-04 | End: 2024-04-14 | Stop reason: HOSPADM

## 2024-04-04 RX ORDER — DIPHENHYDRAMINE HYDROCHLORIDE 50 MG/ML
50 INJECTION INTRAMUSCULAR; INTRAVENOUS ONCE
Status: DISCONTINUED | OUTPATIENT
Start: 2024-04-04 | End: 2024-04-04

## 2024-04-04 RX ORDER — NICARDIPINE HYDROCHLORIDE 0.2 MG/ML
2.5-15 INJECTION INTRAVENOUS CONTINUOUS
Status: DISCONTINUED | OUTPATIENT
Start: 2024-04-04 | End: 2024-04-08

## 2024-04-04 RX ORDER — ACETAMINOPHEN 325 MG/1
975 TABLET ORAL 3 TIMES DAILY
Status: DISCONTINUED | OUTPATIENT
Start: 2024-04-04 | End: 2024-04-05

## 2024-04-04 RX ORDER — KETOROLAC TROMETHAMINE 15 MG/ML
15 INJECTION, SOLUTION INTRAMUSCULAR; INTRAVENOUS ONCE
Status: COMPLETED | OUTPATIENT
Start: 2024-04-04 | End: 2024-04-04

## 2024-04-04 RX ORDER — ALBUTEROL SULFATE 0.83 MG/ML
1.25 SOLUTION RESPIRATORY (INHALATION) EVERY 6 HOURS PRN
Status: DISCONTINUED | OUTPATIENT
Start: 2024-04-04 | End: 2024-04-14 | Stop reason: HOSPADM

## 2024-04-04 RX ORDER — NITROGLYCERIN 20 MG/100ML
0-200 INJECTION INTRAVENOUS CONTINUOUS
Status: DISCONTINUED | OUTPATIENT
Start: 2024-04-04 | End: 2024-04-08

## 2024-04-04 RX ORDER — PROCHLORPERAZINE 25 MG/1
25 SUPPOSITORY RECTAL EVERY 12 HOURS PRN
Status: DISCONTINUED | OUTPATIENT
Start: 2024-04-04 | End: 2024-04-14 | Stop reason: HOSPADM

## 2024-04-04 RX ORDER — PROCHLORPERAZINE MALEATE 10 MG
10 TABLET ORAL EVERY 6 HOURS PRN
Status: DISCONTINUED | OUTPATIENT
Start: 2024-04-04 | End: 2024-04-14 | Stop reason: HOSPADM

## 2024-04-04 RX ORDER — DIPHENHYDRAMINE HYDROCHLORIDE 50 MG/ML
25 INJECTION INTRAMUSCULAR; INTRAVENOUS EVERY 6 HOURS
Status: DISCONTINUED | OUTPATIENT
Start: 2024-04-04 | End: 2024-04-08

## 2024-04-04 RX ORDER — VALSARTAN 160 MG/1
160 TABLET ORAL 2 TIMES DAILY
Status: DISCONTINUED | OUTPATIENT
Start: 2024-04-04 | End: 2024-04-14 | Stop reason: HOSPADM

## 2024-04-04 RX ORDER — SUMATRIPTAN 50 MG/1
50 TABLET, FILM COATED ORAL ONCE AS NEEDED
Status: DISCONTINUED | OUTPATIENT
Start: 2024-04-04 | End: 2024-04-14 | Stop reason: HOSPADM

## 2024-04-04 RX ORDER — SUCRALFATE 1 G/1
1 TABLET ORAL
Status: DISCONTINUED | OUTPATIENT
Start: 2024-04-04 | End: 2024-04-14 | Stop reason: HOSPADM

## 2024-04-04 RX ORDER — ESMOLOL HYDROCHLORIDE 10 MG/ML
50-300 INJECTION, SOLUTION INTRAVENOUS CONTINUOUS
Status: DISCONTINUED | OUTPATIENT
Start: 2024-04-04 | End: 2024-04-08

## 2024-04-04 RX ORDER — ASPIRIN 81 MG/1
81 TABLET ORAL DAILY
Status: DISCONTINUED | OUTPATIENT
Start: 2024-04-04 | End: 2024-04-14 | Stop reason: HOSPADM

## 2024-04-04 RX ORDER — ISOSORBIDE MONONITRATE 30 MG/1
30 TABLET, EXTENDED RELEASE ORAL DAILY
Status: DISCONTINUED | OUTPATIENT
Start: 2024-04-04 | End: 2024-04-05

## 2024-04-04 RX ORDER — HYDRALAZINE HYDROCHLORIDE 10 MG/1
100 TABLET, FILM COATED ORAL 3 TIMES DAILY
Status: DISCONTINUED | OUTPATIENT
Start: 2024-04-04 | End: 2024-04-14 | Stop reason: HOSPADM

## 2024-04-04 RX ORDER — PANTOPRAZOLE SODIUM 40 MG/1
40 TABLET, DELAYED RELEASE ORAL DAILY
Status: DISCONTINUED | OUTPATIENT
Start: 2024-04-04 | End: 2024-04-14 | Stop reason: HOSPADM

## 2024-04-04 RX ORDER — MULTIVIT-MIN/IRON FUM/FOLIC AC 7.5 MG-4
1 TABLET ORAL
Status: DISCONTINUED | OUTPATIENT
Start: 2024-04-04 | End: 2024-04-14 | Stop reason: HOSPADM

## 2024-04-04 RX ORDER — HYDROXYZINE HYDROCHLORIDE 10 MG/1
10 TABLET, FILM COATED ORAL EVERY 6 HOURS PRN
Status: DISCONTINUED | OUTPATIENT
Start: 2024-04-04 | End: 2024-04-14 | Stop reason: HOSPADM

## 2024-04-04 RX ORDER — NICARDIPINE HYDROCHLORIDE 0.2 MG/ML
INJECTION INTRAVENOUS
Status: COMPLETED
Start: 2024-04-04 | End: 2024-04-04

## 2024-04-04 RX ORDER — FLUTICASONE PROPIONATE 50 MCG
2 SPRAY, SUSPENSION (ML) NASAL DAILY
Status: DISCONTINUED | OUTPATIENT
Start: 2024-04-04 | End: 2024-04-14 | Stop reason: HOSPADM

## 2024-04-04 RX ORDER — CARVEDILOL 25 MG/1
25 TABLET ORAL 2 TIMES DAILY
Status: DISCONTINUED | OUTPATIENT
Start: 2024-04-04 | End: 2024-04-05

## 2024-04-04 RX ORDER — PROCHLORPERAZINE EDISYLATE 5 MG/ML
10 INJECTION INTRAMUSCULAR; INTRAVENOUS EVERY 6 HOURS PRN
Status: DISCONTINUED | OUTPATIENT
Start: 2024-04-04 | End: 2024-04-14 | Stop reason: HOSPADM

## 2024-04-04 RX ORDER — HYDRALAZINE HYDROCHLORIDE 50 MG/1
50 TABLET, FILM COATED ORAL 3 TIMES DAILY
Status: DISCONTINUED | OUTPATIENT
Start: 2024-04-04 | End: 2024-04-04

## 2024-04-04 RX ORDER — ACETAMINOPHEN 325 MG/1
TABLET ORAL
Status: COMPLETED
Start: 2024-04-04 | End: 2024-04-04

## 2024-04-04 RX ORDER — FLUTICASONE FUROATE AND VILANTEROL 100; 25 UG/1; UG/1
1 POWDER RESPIRATORY (INHALATION)
Status: DISCONTINUED | OUTPATIENT
Start: 2024-04-04 | End: 2024-04-14 | Stop reason: HOSPADM

## 2024-04-04 RX ORDER — ACETAMINOPHEN 325 MG/1
975 TABLET ORAL 3 TIMES DAILY
Status: DISCONTINUED | OUTPATIENT
Start: 2024-04-04 | End: 2024-04-04

## 2024-04-04 RX ORDER — METOCLOPRAMIDE 10 MG/1
5 TABLET ORAL EVERY 6 HOURS PRN
Status: DISCONTINUED | OUTPATIENT
Start: 2024-04-04 | End: 2024-04-14 | Stop reason: HOSPADM

## 2024-04-04 RX ORDER — CLONIDINE HYDROCHLORIDE 0.1 MG/1
0.1 TABLET ORAL 2 TIMES DAILY
Status: DISCONTINUED | OUTPATIENT
Start: 2024-04-04 | End: 2024-04-07

## 2024-04-04 RX ADMIN — ACETAMINOPHEN 975 MG: 325 TABLET ORAL at 16:17

## 2024-04-04 RX ADMIN — ESMOLOL HYDROCHLORIDE IN SODIUM CHLORIDE 50 MCG/KG/MIN: 10 INJECTION INTRAVENOUS at 08:01

## 2024-04-04 RX ADMIN — ACETAMINOPHEN 975 MG: 325 TABLET ORAL at 02:15

## 2024-04-04 RX ADMIN — METHYLPREDNISOLONE SODIUM SUCCINATE 32 MG: 40 INJECTION, POWDER, FOR SOLUTION INTRAMUSCULAR; INTRAVENOUS at 09:19

## 2024-04-04 RX ADMIN — VALSARTAN 160 MG: 160 TABLET, FILM COATED ORAL at 21:10

## 2024-04-04 RX ADMIN — VALSARTAN 160 MG: 160 TABLET, FILM COATED ORAL at 06:34

## 2024-04-04 RX ADMIN — HEPARIN SODIUM 5000 UNITS: 5000 INJECTION INTRAVENOUS; SUBCUTANEOUS at 17:01

## 2024-04-04 RX ADMIN — CLONIDINE HYDROCHLORIDE 0.1 MG: 0.1 TABLET ORAL at 06:34

## 2024-04-04 RX ADMIN — SUCRALFATE 1 G: 1 TABLET ORAL at 16:17

## 2024-04-04 RX ADMIN — HYDRALAZINE HYDROCHLORIDE 100 MG: 10 TABLET ORAL at 21:09

## 2024-04-04 RX ADMIN — NITROGLYCERIN 70 MCG/MIN: 20 INJECTION INTRAVENOUS at 02:17

## 2024-04-04 RX ADMIN — DIPHENHYDRAMINE HYDROCHLORIDE 50 MG: 50 INJECTION INTRAMUSCULAR; INTRAVENOUS at 09:37

## 2024-04-04 RX ADMIN — ACETAMINOPHEN 975 MG: 325 TABLET ORAL at 21:09

## 2024-04-04 RX ADMIN — ESMOLOL HYDROCHLORIDE IN SODIUM CHLORIDE 150 MCG/KG/MIN: 10 INJECTION INTRAVENOUS at 16:58

## 2024-04-04 RX ADMIN — HYDRALAZINE HYDROCHLORIDE 50 MG: 50 TABLET ORAL at 02:14

## 2024-04-04 RX ADMIN — HEPARIN SODIUM 5000 UNITS: 5000 INJECTION INTRAVENOUS; SUBCUTANEOUS at 23:22

## 2024-04-04 RX ADMIN — HYDRALAZINE HYDROCHLORIDE 50 MG: 50 TABLET ORAL at 16:17

## 2024-04-04 RX ADMIN — PANTOPRAZOLE SODIUM 40 MG: 40 TABLET, DELAYED RELEASE ORAL at 09:03

## 2024-04-04 RX ADMIN — CARVEDILOL 25 MG: 25 TABLET, FILM COATED ORAL at 05:06

## 2024-04-04 RX ADMIN — ESMOLOL HYDROCHLORIDE IN SODIUM CHLORIDE 150 MCG/KG/MIN: 10 INJECTION INTRAVENOUS at 20:55

## 2024-04-04 RX ADMIN — KETOROLAC TROMETHAMINE 15 MG: 15 INJECTION, SOLUTION INTRAMUSCULAR; INTRAVENOUS at 23:22

## 2024-04-04 RX ADMIN — ACETAMINOPHEN 975 MG: 325 TABLET ORAL at 09:02

## 2024-04-04 RX ADMIN — HYDRALAZINE HYDROCHLORIDE 50 MG: 50 TABLET ORAL at 09:03

## 2024-04-04 RX ADMIN — METHYLPREDNISOLONE SODIUM SUCCINATE 32 MG: 40 INJECTION, POWDER, FOR SOLUTION INTRAMUSCULAR; INTRAVENOUS at 01:44

## 2024-04-04 RX ADMIN — PROCHLORPERAZINE MALEATE 10 MG: 10 TABLET ORAL at 01:45

## 2024-04-04 RX ADMIN — IOHEXOL 75 ML: 350 INJECTION, SOLUTION INTRAVENOUS at 10:29

## 2024-04-04 RX ADMIN — ATORVASTATIN CALCIUM 80 MG: 80 TABLET, FILM COATED ORAL at 03:24

## 2024-04-04 RX ADMIN — NICARDIPINE HYDROCHLORIDE 5 MG/HR: 0.2 INJECTION, SOLUTION INTRAVENOUS at 18:17

## 2024-04-04 RX ADMIN — ISOSORBIDE MONONITRATE 30 MG: 30 TABLET, EXTENDED RELEASE ORAL at 03:24

## 2024-04-04 RX ADMIN — ASPIRIN 81 MG: 81 TABLET, COATED ORAL at 09:00

## 2024-04-04 RX ADMIN — AMLODIPINE BESYLATE 10 MG: 10 TABLET ORAL at 06:34

## 2024-04-04 RX ADMIN — DIPHENHYDRAMINE HYDROCHLORIDE 25 MG: 50 INJECTION, SOLUTION INTRAMUSCULAR; INTRAVENOUS at 18:34

## 2024-04-04 RX ADMIN — CLONIDINE HYDROCHLORIDE 0.1 MG: 0.1 TABLET ORAL at 21:09

## 2024-04-04 RX ADMIN — SUCRALFATE 1 G: 1 TABLET ORAL at 09:05

## 2024-04-04 RX ADMIN — NITROGLYCERIN 80 MCG/MIN: 20 INJECTION INTRAVENOUS at 20:56

## 2024-04-04 RX ADMIN — ATORVASTATIN CALCIUM 80 MG: 80 TABLET, FILM COATED ORAL at 21:09

## 2024-04-04 RX ADMIN — CARVEDILOL 25 MG: 25 TABLET, FILM COATED ORAL at 21:09

## 2024-04-04 RX ADMIN — NITROGLYCERIN 80 MCG/MIN: 20 INJECTION INTRAVENOUS at 09:29

## 2024-04-04 SDOH — SOCIAL STABILITY: SOCIAL INSECURITY: ABUSE: ADULT

## 2024-04-04 SDOH — SOCIAL STABILITY: SOCIAL INSECURITY: DO YOU FEEL ANYONE HAS EXPLOITED OR TAKEN ADVANTAGE OF YOU FINANCIALLY OR OF YOUR PERSONAL PROPERTY?: NO

## 2024-04-04 SDOH — SOCIAL STABILITY: SOCIAL INSECURITY: ARE YOU OR HAVE YOU BEEN THREATENED OR ABUSED PHYSICALLY, EMOTIONALLY, OR SEXUALLY BY ANYONE?: NO

## 2024-04-04 SDOH — SOCIAL STABILITY: SOCIAL INSECURITY: DO YOU FEEL UNSAFE GOING BACK TO THE PLACE WHERE YOU ARE LIVING?: NO

## 2024-04-04 SDOH — SOCIAL STABILITY: SOCIAL INSECURITY: WERE YOU ABLE TO COMPLETE ALL THE BEHAVIORAL HEALTH SCREENINGS?: YES

## 2024-04-04 SDOH — SOCIAL STABILITY: SOCIAL INSECURITY: DOES ANYONE TRY TO KEEP YOU FROM HAVING/CONTACTING OTHER FRIENDS OR DOING THINGS OUTSIDE YOUR HOME?: NO

## 2024-04-04 SDOH — SOCIAL STABILITY: SOCIAL INSECURITY: ARE THERE ANY APPARENT SIGNS OF INJURIES/BEHAVIORS THAT COULD BE RELATED TO ABUSE/NEGLECT?: NO

## 2024-04-04 SDOH — SOCIAL STABILITY: SOCIAL INSECURITY: HAVE YOU HAD THOUGHTS OF HARMING ANYONE ELSE?: NO

## 2024-04-04 SDOH — SOCIAL STABILITY: SOCIAL INSECURITY: HAS ANYONE EVER THREATENED TO HURT YOUR FAMILY OR YOUR PETS?: NO

## 2024-04-04 ASSESSMENT — COGNITIVE AND FUNCTIONAL STATUS - GENERAL
PATIENT BASELINE BEDBOUND: NO
DAILY ACTIVITIY SCORE: 24
MOBILITY SCORE: 24

## 2024-04-04 ASSESSMENT — ENCOUNTER SYMPTOMS
FATIGUE: 1
RHINORRHEA: 0
DIFFICULTY URINATING: 0
SORE THROAT: 0
DIAPHORESIS: 0
MYALGIAS: 1
VOMITING: 1
CHEST TIGHTNESS: 0
LIGHT-HEADEDNESS: 0
APPETITE CHANGE: 1
BLOOD IN STOOL: 0
COUGH: 0
CONSTIPATION: 0
NAUSEA: 1
ANAL BLEEDING: 0
PALPITATIONS: 1
BRUISES/BLEEDS EASILY: 0
CHILLS: 0
ABDOMINAL PAIN: 0
UNEXPECTED WEIGHT CHANGE: 1
FEVER: 0
DIARRHEA: 1
NUMBNESS: 0
SHORTNESS OF BREATH: 1
DYSURIA: 0
HEADACHES: 1
DIZZINESS: 0

## 2024-04-04 ASSESSMENT — PAIN SCALES - GENERAL
PAINLEVEL_OUTOF10: 0 - NO PAIN
PAINLEVEL_OUTOF10: 0 - NO PAIN
PAINLEVEL_OUTOF10: 7
PAINLEVEL_OUTOF10: 5 - MODERATE PAIN
PAINLEVEL_OUTOF10: 0 - NO PAIN
PAINLEVEL_OUTOF10: 0 - NO PAIN
PAINLEVEL_OUTOF10: 7
PAINLEVEL_OUTOF10: 5 - MODERATE PAIN

## 2024-04-04 ASSESSMENT — PAIN DESCRIPTION - ORIENTATION
ORIENTATION: LEFT
ORIENTATION: LEFT

## 2024-04-04 ASSESSMENT — ACTIVITIES OF DAILY LIVING (ADL)
DRESSING YOURSELF: INDEPENDENT
WALKS IN HOME: INDEPENDENT
JUDGMENT_ADEQUATE_SAFELY_COMPLETE_DAILY_ACTIVITIES: YES
TOILETING: INDEPENDENT
GROOMING: INDEPENDENT
PATIENT'S MEMORY ADEQUATE TO SAFELY COMPLETE DAILY ACTIVITIES?: YES
FEEDING YOURSELF: INDEPENDENT
BATHING: INDEPENDENT
HEARING - RIGHT EAR: FUNCTIONAL
HEARING - LEFT EAR: FUNCTIONAL
ADEQUATE_TO_COMPLETE_ADL: YES

## 2024-04-04 ASSESSMENT — PAIN - FUNCTIONAL ASSESSMENT
PAIN_FUNCTIONAL_ASSESSMENT: 0-10
PAIN_FUNCTIONAL_ASSESSMENT: NO/DENIES PAIN
PAIN_FUNCTIONAL_ASSESSMENT: 0-10

## 2024-04-04 ASSESSMENT — PATIENT HEALTH QUESTIONNAIRE - PHQ9
2. FEELING DOWN, DEPRESSED OR HOPELESS: NOT AT ALL
SUM OF ALL RESPONSES TO PHQ9 QUESTIONS 1 & 2: 0
1. LITTLE INTEREST OR PLEASURE IN DOING THINGS: NOT AT ALL

## 2024-04-04 ASSESSMENT — PAIN DESCRIPTION - LOCATION
LOCATION: WRIST
LOCATION: WRIST

## 2024-04-04 ASSESSMENT — LIFESTYLE VARIABLES
SUBSTANCE_ABUSE_PAST_12_MONTHS: NO
PRESCIPTION_ABUSE_PAST_12_MONTHS: NO
SKIP TO QUESTIONS 9-10: 1
HOW OFTEN DO YOU HAVE A DRINK CONTAINING ALCOHOL: NEVER
AUDIT-C TOTAL SCORE: 0
HOW MANY STANDARD DRINKS CONTAINING ALCOHOL DO YOU HAVE ON A TYPICAL DAY: PATIENT DOES NOT DRINK
AUDIT-C TOTAL SCORE: 0
HOW OFTEN DO YOU HAVE 6 OR MORE DRINKS ON ONE OCCASION: NEVER

## 2024-04-04 NOTE — H&P
History Of Present Illness  Stacey Heath is a 53 y.o. female with PMH ESRD 2/2 HTN and diabetes on dialysis T/Th/Sat (last complete session 4/2/24) and poorly controlled hypertension, DMT2, and COPD who presented to the ED 4/3/24 with SOB on waking and a headache. Denies any medication noncompliance and notably has had multiple admissions for HTN emergency and flash pulmonary edema, most recently 2/28-3/3/24. Vitals initially notable for /99 and labs notable for Cr 6.12, BNP 2016, troponin 193--> 116. Does not wear oxygen at home but notes that she often requires oxygen when she is fluid overloaded. Currently being evaluated for kidney transplant and was noted to be significantly SOB at recent appointment 3/29/24, planned for outpatient CT chest, PFTs and sleep study.   In the ED, she received hydralazine 10m IV x2, labetalol 20mg IV x1, morphine 4mg IV x1, and was started on a nitroglycerin gtt goal MAP 60-70. CT angio aorta and b/l ileofemoral to rule out aortic dissection.   Admitted to CICU for hypertensive urgency.     On arrival to CICU, patient awake and in no distress. Complains of feeling fluid overloaded by facial puffiness and 7/10 frontal headache that does not feel like her typical migraine headache.   Denies chest pain, endorses shortness of breath and palpitations, diarrhea daily for several weeks and weight loss.     Nephrologist: Dr. Garcia, dialysis T/Th/Sat at [facility]     Past Medical History  Past Medical History:   Diagnosis Date    Acute pancreatitis 01/20/2024    Acute pyelonephritis due to bacteria 01/16/2023    Acute renal failure (CMS/[facility]) 02/25/2023    YARA (acute kidney injury) (CMS/[facility]) 08/03/2021    Bilateral shoulder pain 02/25/2023    Breast pain 05/18/2021    History of breast pain    Cardiac/pericardial tamponade 01/20/2024    Chronic renal failure syndrome 01/10/2022    Community acquired pneumonia 11/03/2021    Dependence on renal dialysis (CMS/[facility]) 11/21/2022     Hemodialysis patient    Disorder of sweat glands 02/25/2023    Dry eye syndrome of bilateral lacrimal glands 03/07/2017    Dry eyes    Essential (primary) hypertension 12/27/2022    Hypertension    Flash pulmonary edema (CMS/[facility]) 01/20/2024    Comment on above: FLASH PULMONARY EDEMA J81.0    History of acute pancreatitis 12/21/2020    History of acute pancreatitis    Low grade squamous intraepithelial lesion (LGSIL) on cervicovaginal cytologic smear 02/25/2023    Migraine headache 02/25/2023    Comment on above: MIGRAINES    Migraines     History of migraine    Obstruction of urinary stent (CMS/[facility]) 02/25/2023    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    Other conditions influencing health status 09/01/2021    History of nephrostomy    Pain in upper limb 01/20/2024    Comment on above: ARM PAIN    Periorbital cellulitis 06/13/2014    Formatting of this note might be different from the original. Improved Afebrile No pain on EOM Can see clearly from left eye PLAN PO augmentin as outpatient    Pleural effusion 12/12/2023    Pneumonia 04/11/2023    Sepsis (CMS/[facility]) 01/20/2024    Status migrainosus 08/21/2012    Type 2 myocardial infarction (CMS/[facility]) 01/20/2024    Unspecified diastolic (congestive) heart failure (CMS/[facility]) 11/21/2022    Heart failure with preserved left ventricular function (HFpEF)    Vaginal dryness 07/29/2022    Vaginal dryness     Dialysis history   Hemodialysis: Current dialysis Tuesday Thursday Saturday.  Dry weight 52 kg.  Via right upper extremity AV fistula.   ROS: oliguric, no urinary retention/hematuria/recurrent UTIs.  Kidney stone requiring cystoscopy removal  Disease Etiology:  diabetic nephropathy and hypertensive nephropathy.   Disease Complications:  congestive heart failure, dyslipidemia and hypertension.     Surgical History  Past Surgical History:   Procedure Laterality Date    APPENDECTOMY  03/07/2017    Appendectomy    CT ABDOMEN ANGIOGRAM W AND/OR WO IV CONTRAST   4/23/2023    CT ABDOMEN ANGIOGRAM W AND/OR WO IV CONTRAST [facility] CT    IR VENOGRAM DIALYSIS  8/5/2021    IR VENOGRAM DIALYSIS 8/5/2021    OTHER SURGICAL HISTORY  03/07/2017    Cystoscopy With Pyeloscopy With Removal Of Calculus    OTHER SURGICAL HISTORY  03/07/2017    Anoscopy For Polyp Removal    OTHER SURGICAL HISTORY  12/08/2021    Arteriovenous fistula creation procedure    OTHER SURGICAL HISTORY  12/08/2021    Dialysis tunneled catheter placement    TUBAL LIGATION  03/07/2017    Tubal Ligation        Social History  She reports that she has quit smoking. Her smoking use included cigarettes. She has never used smokeless tobacco. She reports that she does not currently use alcohol. She reports that she does not use drugs.  Quit smoking 2 years ago    Family History  Family History   Problem Relation Name Age of Onset    Other (Cerebrovascular Accident) Father      Heart failure Paternal Grandmother      Breast cancer Paternal Grandmother      Other (Primary Cervical Cancer) Paternal Grandmother      Diabetes Paternal Grandfather          Allergies  Iodine, Bioflavonoids, Codeine, Flowers, Hydromorphone, and Shellfish containing products    Review of Systems   Constitutional:  Positive for appetite change, fatigue and unexpected weight change. Negative for chills, diaphoresis and fever.   HENT:  Negative for congestion, rhinorrhea, sneezing and sore throat.    Eyes:  Negative for visual disturbance.   Respiratory:  Positive for shortness of breath. Negative for cough and chest tightness.    Cardiovascular:  Positive for palpitations. Negative for chest pain and leg swelling.   Gastrointestinal:  Positive for diarrhea, nausea and vomiting. Negative for abdominal pain, anal bleeding, blood in stool and constipation.   Genitourinary:  Negative for difficulty urinating and dysuria.   Musculoskeletal:  Positive for myalgias. Negative for gait problem.   Neurological:  Positive for headaches. Negative for dizziness,  "syncope, light-headedness and numbness.   Hematological:  Does not bruise/bleed easily.        Physical Exam  Constitutional:       General: She is not in acute distress.     Appearance: She is not toxic-appearing or diaphoretic.   HENT:      Head: Normocephalic and atraumatic.      Mouth/Throat:      Mouth: Mucous membranes are moist.   Eyes:      General: No scleral icterus.     Extraocular Movements: Extraocular movements intact.   Cardiovascular:      Rate and Rhythm: Normal rate and regular rhythm.      Pulses: Normal pulses.      Heart sounds: Normal heart sounds. No murmur heard.  Pulmonary:      Effort: Pulmonary effort is normal. No respiratory distress.      Breath sounds: Normal breath sounds.   Abdominal:      General: Abdomen is flat. Bowel sounds are normal. There is no distension.      Palpations: Abdomen is soft.      Tenderness: There is no abdominal tenderness.   Musculoskeletal:      Right lower leg: No edema.      Left lower leg: No edema.   Skin:     General: Skin is warm and dry.      Capillary Refill: Capillary refill takes less than 2 seconds.   Neurological:      General: No focal deficit present.      Mental Status: She is alert and oriented to person, place, and time.   Psychiatric:         Mood and Affect: Mood normal.         Behavior: Behavior normal.          Last Recorded Vitals  Blood pressure (!) 253/87, pulse 91, temperature 36.9 °C (98.4 °F), temperature source Temporal, resp. rate 20, height 1.397 m (4' 7\"), weight 51.3 kg (113 lb), SpO2 99 %.    Relevant Results  Scheduled medications  diphenhydrAMINE, 50 mg, intravenous, Once  [Held by provider] heparin (porcine), 5,000 Units, subcutaneous, q8h [facility]  methylPREDNISolone sodium succinate (PF), 40 mg, intravenous, q4h  perflutren lipid microspheres, 0.5-10 mL of dilution, intravenous, Once in imaging  perflutren protein A microsphere, 0.5 mL, intravenous, Once in imaging  sulfur hexafluoride microsphr, 2 mL, intravenous, Once in " imaging      Continuous medications  nitroglycerin, 0-200 mcg/min, Last Rate: 40 mcg/min (04/03/24 6749)      PRN medications  PRN medications: oxygen          CT head   IMPRESSION:  No acute intracranial abnormality or mass effect.    Cardiac POCUS   Findings:    Views: parasternal long, parasternal short and apical four   The pericardial space was visualized and was NEGATIVE for a significant   pericardial effusion.   Activity: Ventricular contractions were visualized.   LV: LV systolic function was NORMAL.   RV: RV size was NORMAL.     Impression:   Cardiac: The focused cardiac ultrasound exam had ABNORMAL findings as   specified.     CXR  IMPRESSION:  1.  Enlarged cardiomediastinal silhouette and moderate pulmonary  edema, intervally worsened compared to prior exam.  2. Bilateral small pleural effusions.    PRIOR IMAGING  Stress test: 12/2023 ([facility])  CONCLUSIONS:    1. SPECT Perfusion Study: Normal.    2. There is no scintigraphic evidence for inducible ischemia.    3. No evidence of scarred myocardium.    4. Left ventricle is normal in size. The left ventricle systolic   function is normal.    5. Right ventricle is normal in size. The right ventricle systolic   function is normal.    6. This is a low risk scan.     Gated Stress FBP   Gated Rest FBP    LVEF % 60               58         TTE: 6/2023  CONCLUSIONS:  1. Left ventricular systolic function is normal with a 55-60% estimated ejection fraction.  2. Moderately increased left ventricular septal thickness.  3. Spectral Doppler shows a pseudonormal pattern of left ventricular diastolic filling.  4. The left ventricular posterior wall thickness is moderately increased.  5. There is an elevated mean left atrial pressure.  6. There is severe concentric left ventricular hypertrophy.  7. The left atrium is moderately dilated.  8. There is moderate mitral annular calcification.       Assessment/Plan   Principal Problem:    Shortness of breath  Hypertensive  emergency    Ms. Stacey Heath is a 53 year old female with ESRD 2/2 HTN and T2DM and poorly controlled hypertension presenting with SOB in setting of hypertensive emergency despite stated medication and dialysis adherence. Admitted to CICU for management of hypertensive emergency in nitroglycerin gtt.     NEUROLOGIC  #Headache  -secondary to hypertension vs nitroglycerin gtt  -wean nitroglycerin gtt as tolerated  -tylenol 975mg tid scheduled    #Nausea  -home reglan resumed  -patient says zofran does not work for her  -consider compazine, Qtc 450 4/3/24    CARDIOVASCULAR  #Hypertensive emergency  #Pulmonary edema  -Presented with /99, max at 265/88  -goal reduce SBP by 25% in first 24hrs, goal is SBP ~190  -nitroglycerin gtt, wean as tolerated  -continue home regimen of amlodipine 10mg daily, coreg 25mg bid, clonidine 0.1mg bid, hydralazine 50mg tid, imdur ER 30mg daily, valsartan 160mg bid  -continue home atorvastatin 80mg     #Concern for aortic dissection  -follow up CT angio with fem runoff  -patient with contrast allergy and receiving pre-treatment per institutional protocol, plan for CT angio at 10:45    #HFpEF  ::Echo 6/2023 with EF 55-60%, repeat done 4/3/24, read pending    PULMONARY  #AHRF 2/2 volume overload/moderate pulmonary edema  -plan for dialysis in the morning, need renal consult in AM  -goal O2 sat >94%, titrate oxygen as needed    #COPD?   -following with pulm fellow clinic at , no PFTs yet to confirm COPD diagnosis  -planned for outpatient CT chest, PFTs, and sleep study  -continue home albuterol prn and breo ellipta    #History of Covid-19  -diagnosed on last admission 2/28/24  -no need for isolation    RENAL/  #ESRD on dialysis T/Th/Sat via RUE AVF, makes 1 cup urine daily  #Hypervolemia  -recommend consult renal in AM for scheduled dialysis  -dry weight reported as 52kg  -renally dose meds, avoid nephrotoxic medications. Patient did receive contrast for CTA aorta    #Renal  appointment 4/4/24 missed while inpatient  -had been scheduled with Dr. Barraza, will need to  be rescheduled  #history of kidney stones  -nothing to do    GASTROINTESTINAL  ZAIN    ENDOCRINE  #T2DM c/b retinopathy, nephropathy and neuropathy  -no medications    HEME/ONC  #Anemia of chronic disease I/s/o ESRD  -on Epo outpatient   -daily cbc    INFECTIOUS DISEASE  ZAIN    MSK/DERM  ZAIN      F: caution, hypervolemic  E: prn, ESRD  N: renal diet  A: L radial arterial line 4/3/24, RUE AVF  DVT ppx: SQH  GI ppx: home PPI    Code Status: Full code (confirmed on admission)  Surrogate Medical Decision-maker:    Daughter, Diya Jang ([phone]) - [phone]  and Rohini Piña     [phone]           Maria L Barba MD

## 2024-04-04 NOTE — PROGRESS NOTES
Physical Therapy                 Therapy Communication Note    Patient Name: Stacey Heath  MRN: [id number]  Today's Date: 4/4/2024     Discipline: Physical Therapy    Missed Visit Reason: Missed Visit Reason: Other (Comment) (Patient with unstable BP, pending CT to assess for possible aortic dissection. Will hold until medically stable.)    Missed Time: Attempt    Comment:

## 2024-04-04 NOTE — ED PROCEDURE NOTE
Procedure  Critical Care    Performed by: YONIS Rodríguez  Authorized by: Annabella Castro MD    Critical care provider statement:     Critical care time (minutes):  45    Critical care time was exclusive of:  Separately billable procedures and treating other patients and teaching time    Critical care was necessary to treat or prevent imminent or life-threatening deterioration of the following conditions:  Cardiac failure and renal failure (hypertension)    Critical care was time spent personally by me on the following activities:  Blood draw for specimens, ordering and performing treatments and interventions, development of treatment plan with patient or surrogate, ordering and review of laboratory studies, discussions with consultants, ordering and review of radiographic studies, evaluation of patient's response to treatment, re-evaluation of patient's condition, examination of patient and review of old charts    Care discussed with: admitting provider                 YONIS Rodríguez  04/03/24 4823

## 2024-04-04 NOTE — CONSULTS
Reason For Consult  ESRD, HTN emergency    History Of Present Illness  Stacey Heath is a 53 y.o. female with PMH ESRD on HD TTS via RUE AVF, DM, HTN, HFpEF, and COPD presented on 4/3/2024 for SOB and headache. Found to have elevated BP requiring IV nitroglycerin drip and admitted to CICU. Being worked up to rule out aortic dissection. Nephrology consulted for dialysis needs.    Pt seen resting in bed. Appears comfortable. On nc. Still on IV nitroglycerin drip. Her Nephrologist is Dr. Garcia and [facility]      Past Medical History  She has a past medical history of Acute pancreatitis (01/20/2024), Acute pyelonephritis due to bacteria (01/16/2023), Acute renal failure (CMS/[facility]) (02/25/2023), YARA (acute kidney injury) (CMS/[facility]) (08/03/2021), Bilateral shoulder pain (02/25/2023), Breast pain (05/18/2021), Cardiac/pericardial tamponade (01/20/2024), Chronic renal failure syndrome (01/10/2022), Community acquired pneumonia (11/03/2021), Dependence on renal dialysis (CMS/[facility]) (11/21/2022), Disorder of sweat glands (02/25/2023), Dry eye syndrome of bilateral lacrimal glands (03/07/2017), Essential (primary) hypertension (12/27/2022), Flash pulmonary edema (CMS/[facility]) (01/20/2024), History of acute pancreatitis (12/21/2020), Low grade squamous intraepithelial lesion (LGSIL) on cervicovaginal cytologic smear (02/25/2023), Migraine headache (02/25/2023), Migraines, Obstruction of urinary stent (CMS/[facility]) (02/25/2023), 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 (02/25/2023), Other conditions influencing health status (09/01/2021), Pain in upper limb (01/20/2024), Periorbital cellulitis (06/13/2014), Pleural effusion (12/12/2023), Pneumonia (04/11/2023), Sepsis (CMS/[facility]) (01/20/2024), Status migrainosus (08/21/2012), Type 2 myocardial infarction (CMS/[facility]) (01/20/2024), Unspecified diastolic (congestive) heart failure (CMS/[facility]) (11/21/2022), and Vaginal dryness (07/29/2022).    Surgical History  She has a past  "surgical history that includes Tubal ligation (03/07/2017); Other surgical history (03/07/2017); Appendectomy (03/07/2017); Other surgical history (03/07/2017); Other surgical history (12/08/2021); Other surgical history (12/08/2021); CT angio abdomen w and or wo IV IV contrast (4/23/2023); and IR venogram dialysis (8/5/2021).     Social History  She reports that she has quit smoking. Her smoking use included cigarettes. She has never used smokeless tobacco. She reports that she does not currently use alcohol. She reports that she does not use drugs.    Family History  Family History   Problem Relation Name Age of Onset    Other (Cerebrovascular Accident) Father      Heart failure Paternal Grandmother      Breast cancer Paternal Grandmother      Other (Primary Cervical Cancer) Paternal Grandmother      Diabetes Paternal Grandfather          Allergies  Iodine, Bioflavonoids, Codeine, Flowers, Hydromorphone, and Shellfish containing products    Review of Systems  SOB, headache     Physical Exam  General appearance: AAOx3. No distress  Eyes: non-icteric  Skin: no apparent rash  Heart: rhythm regular.   Lungs: CTA bilat.  no wheezing/crackles, nc  Abdomen: soft, nt/nd  Extremities: no edema bilat  : no Nick  Neuro: No FND  ACCESS: RUE AVF            I&O 24HR    Intake/Output Summary (Last 24 hours) at 4/4/2024 1130  Last data filed at 4/4/2024 0600  Gross per 24 hour   Intake 128.2 ml   Output --   Net 128.2 ml       Vitals 24HR  Heart Rate:  []   Temp:  [35.8 °C (96.4 °F)-36.3 °C (97.3 °F)]   Resp:  [14-27]   BP: (184-265)/(68-97)   Height:  [139.7 cm (4' 7\")]   Weight:  [51.3 kg (113 lb)-51.3 kg (113 lb 1.5 oz)]   SpO2:  [92 %-100 %]         Relevant Results    Current Facility-Administered Medications:     acetaminophen (Tylenol) tablet 975 mg, 975 mg, oral, TID, Maria L Barba MD, 975 mg at 04/04/24 0902    albuterol 2.5 mg /3 mL (0.083 %) nebulizer solution 1.25 mg, 1.25 mg, nebulization, q6h PRN, Maria L Barba, " MD    amLODIPine (Norvasc) tablet 10 mg, 10 mg, oral, Daily, Maria L Barba MD, 10 mg at 04/04/24 0634    aspirin EC tablet 81 mg, 81 mg, oral, Daily, Maria L Barba MD, 81 mg at 04/04/24 0900    atorvastatin (Lipitor) tablet 80 mg, 80 mg, oral, Nightly, Maria L Barba MD, 80 mg at 04/04/24 0324    carvedilol (Coreg) tablet 25 mg, 25 mg, oral, BID, Maria L Barba MD, 25 mg at 04/04/24 0506    cloNIDine (Catapres) tablet 0.1 mg, 0.1 mg, oral, BID, Maria L Barba MD, 0.1 mg at 04/04/24 0634    esmolol (Brevibloc) bolus from bag 25,650 mcg, 500 mcg/kg, intravenous, Once **FOLLOWED BY** esmolol (Brevibloc) 2500 mg in sodium chloride 250 mL (10 mg/mL) infusion (premix),  mcg/kg/min, intravenous, Continuous, Savanah Randall MD, Last Rate: 46.2 mL/hr at 04/04/24 0900, 150 mcg/kg/min at 04/04/24 0900    fluticasone (Flonase) nasal spray 2 spray, 2 spray, Each Nostril, Daily, Maria L Barba MD    fluticasone furoate-vilanteroL (Breo Ellipta) 100-25 mcg/dose inhaler 1 puff, 1 puff, inhalation, Daily, Maria L Barba MD    heparin (porcine) injection 5,000 Units, 5,000 Units, subcutaneous, q8h BELEN, Maria L Barba MD, 5,000 Units at 04/03/24 2220    hydrALAZINE (Apresoline) tablet 50 mg, 50 mg, oral, TID, Maria L Barba MD, 50 mg at 04/04/24 0903    hydrOXYzine HCL (Atarax) tablet 10 mg, 10 mg, oral, q6h PRN, Maria L Barba MD    isosorbide mononitrate ER (Imdur) 24 hr tablet 30 mg, 30 mg, oral, Daily, Maria L Barba MD, 30 mg at 04/04/24 0324    metoclopramide (Reglan) tablet 5 mg, 5 mg, oral, q6h PRN, Maria L Barba MD    multivitamin with minerals 1 tablet, 1 tablet, oral, Daily, Maria L Barba MD    nitroglycerin (Tridil) 50 mg in dextrose 5% 250 mL (0.2 mg/mL) infusion (premix), 0-200 mcg/min, intravenous, Continuous, Maria L Barba MD, Last Rate: 24 mL/hr at 04/04/24 1000, 80 mcg/min at 04/04/24 1000    oxygen (O2) therapy, , inhalation, Continuous PRN - O2/gases, Maria L Barba MD    pantoprazole (ProtoNix) EC tablet 40 mg, 40 mg, oral, Daily, Maria L Barba MD,  40 mg at 04/04/24 0903    perflutren lipid microspheres (Definity) injection 0.5-10 mL of dilution, 0.5-10 mL of dilution, intravenous, Once in imaging, Maria L Barba MD    perflutren protein A microsphere (Optison) injection 0.5 mL, 0.5 mL, intravenous, Once in imaging, Maria L Barba MD    prochlorperazine (Compazine) tablet 10 mg, 10 mg, oral, q6h PRN, 10 mg at 04/04/24 0145 **OR** prochlorperazine (Compazine) injection 10 mg, 10 mg, intravenous, q6h PRN **OR** prochlorperazine (Compazine) suppository 25 mg, 25 mg, rectal, q12h PRN, Maria L Barba MD    sodium chloride (Ocean) 0.65 % nasal spray 1 spray, 1 spray, Each Nostril, 4x daily PRN, Maria L Barba MD    sucralfate (Carafate) tablet 1 g, 1 g, oral, BID AC, Maria L Barba MD, 1 g at 04/04/24 0905    sulfur hexafluoride microsphr (Lumason) injection 24.28 mg, 2 mL, intravenous, Once in imaging, Maria L Barba MD    SUMAtriptan (Imitrex) tablet 50 mg, 50 mg, oral, Once PRN, Maria L Barba MD    valsartan (Diovan) tablet 160 mg, 160 mg, oral, BID, Maria L Barba MD, 160 mg at 04/04/24 0634      Assessment/Plan   Stacey Heath is a 53 y.o. female with PMH ESRD on HD TTS via RUE AVF, DM, HTN, HFpEF, and COPD presented on 4/3/2024 for SOB and headache. Found to have elevated BP requiring IV nitroglycerin drip and admitted to CICU. Being worked up to rule out aortic dissection. Nephrology consulted for dialysis needs.    ESRD on HD TTS via RUE AVF  - Her Nephrologist is Dr. Garcia and she dialyzes at [facility]. EDW 55kg.  - BP high - on IV nitroglycerin drip  - on nc  - labs: K 4.3, HCO3 25    HTN Urgency  - On IV nitroglycerin drip  - home BP meds: amlodipine, coreg, clonidine, hydralazine, imdur, valsartan, torsemide  - CTA pending to rule out aortic dissection    Anemia of CKD - Hb 8.9; gets mircera at her dialysis unit  Min[place] Bone Disease - on renal MV    Plan  - HD today as per TTS schedule  - re-introduce home BP meds gradually    D/w Dr. Swapna Medina,  MD  Nephrology Fellow PGY 5  Contact via secure chat  Nephrology Pager # [phone] ([phone])

## 2024-04-04 NOTE — NURSING NOTE
Chun consulted by bedside RN for PIV placement. Chun RN to bedside to assess. Patient is a dialysis patient with left arm access only, scanned left arm via ultrasound, no vessels noted for IV cannulation, bedside RN aware.

## 2024-04-04 NOTE — PROGRESS NOTES
Occupational Therapy                 Therapy Communication Note    Patient Name: Stacey Heath  MRN: [id number]  Today's Date: 4/4/2024     Discipline: Occupational Therapy    Missed Visit Reason: Missed Visit Reason:  (Patient with unstable BP, pending CT to assess for possible aortic dissection. No OT eval at this time.)    Missed Time: Attempt 0831

## 2024-04-04 NOTE — PROGRESS NOTES
"Stacey Heath is a 53 y.o. female on day 1 of admission presenting with Shortness of breath.    Subjective   Feel well this morning, reports that she feels she is less fluid overloaded, puffiness in the face is better, she still reports tension like headache bifrontal, 8/10, reported difficulty sleeping as well for the past 2 days.   She reports nausea.   Reports chronic diarrhea that has been going on for \"a while\" comes for a few weeks and goes away on its own, watery that happens postprandial- Last was yesterday before she came and she did not have any as she did not eat anything.       Objective   Vitals:    04/04/24 0700   BP:    Pulse: 96   Resp: 18   Temp:    SpO2: 100%         Physical Exam  Constitutional:       Appearance: Normal appearance.   HENT:      Head: Normocephalic and atraumatic.   Eyes:      Extraocular Movements: Extraocular movements intact.      Pupils: Pupils are equal, round, and reactive to light.   Cardiovascular:      Rate and Rhythm: Normal rate and regular rhythm.      Heart sounds: No murmur heard.     No gallop.   Pulmonary:      Effort: Pulmonary effort is normal. No respiratory distress.      Breath sounds: Rales present. No wheezing.   Abdominal:      General: Bowel sounds are normal.      Palpations: Abdomen is soft.      Tenderness: There is no abdominal tenderness. There is no guarding or rebound.   Skin:     General: Skin is warm and dry.   Neurological:      General: No focal deficit present.      Mental Status: She is alert and oriented to person, place, and time. Mental status is at baseline.   Psychiatric:         Mood and Affect: Mood normal.         Behavior: Behavior normal.         Last Recorded Vitals  Blood pressure (!) 221/68, pulse 96, temperature 35.8 °C (96.4 °F), resp. rate 18, height 1.397 m (4' 7\"), weight 51.3 kg (113 lb 1.5 oz), SpO2 100 %.  Intake/Output last 3 Shifts:  I/O last 3 completed shifts:  In: 128.2 (2.5 mL/kg) [I.V.:128.2 (2.5 mL/kg)]  Out: - (0 " mL/kg)   Weight: 51.3 kg     Relevant Results           This patient currently has cardiac telemetry ordered; if you would like to modify or discontinue the telemetry order, click here to go to the orders activity to modify/discontinue the order.    Results for orders placed or performed during the hospital encounter of 04/03/24 (from the past 24 hour(s))   ECG 12 lead   Result Value Ref Range    Ventricular Rate 99 BPM    Atrial Rate 99 BPM    VT Interval 144 ms    QRS Duration 84 ms    QT Interval 348 ms    QTC Calculation(Bazett) 446 ms    P Axis 44 degrees    R Axis 9 degrees    T Axis 162 degrees    QRS Count 17 beats    Q Onset 218 ms    P Onset 146 ms    P Offset 195 ms    T Offset 392 ms    QTC Fredericia 411 ms   CBC and Auto Differential   Result Value Ref Range    WBC 5.0 4.4 - 11.3 x10*3/uL    nRBC 0.0 0.0 - 0.0 /100 WBCs    RBC 3.27 (L) 4.00 - 5.20 x10*6/uL    Hemoglobin 9.4 (L) 12.0 - 16.0 g/dL    Hematocrit 29.1 (L) 36.0 - 46.0 %    MCV 89 80 - 100 fL    MCH 28.7 26.0 - 34.0 pg    MCHC 32.3 32.0 - 36.0 g/dL    RDW 15.9 (H) 11.5 - 14.5 %    Platelets 214 150 - 450 x10*3/uL    Neutrophils % 61.8 40.0 - 80.0 %    Immature Granulocytes %, Automated 0.8 0.0 - 0.9 %    Lymphocytes % 20.6 13.0 - 44.0 %    Monocytes % 7.7 2.0 - 10.0 %    Eosinophils % 7.9 0.0 - 6.0 %    Basophils % 1.2 0.0 - 2.0 %    Neutrophils Absolute 3.07 1.20 - 7.70 x10*3/uL    Immature Granulocytes Absolute, Automated 0.04 0.00 - 0.70 x10*3/uL    Lymphocytes Absolute 1.02 (L) 1.20 - 4.80 x10*3/uL    Monocytes Absolute 0.38 0.10 - 1.00 x10*3/uL    Eosinophils Absolute 0.39 0.00 - 0.70 x10*3/uL    Basophils Absolute 0.06 0.00 - 0.10 x10*3/uL   Phosphorus   Result Value Ref Range    Phosphorus 3.6 2.5 - 4.9 mg/dL   Magnesium   Result Value Ref Range    Magnesium 2.36 1.60 - 2.40 mg/dL   Comprehensive metabolic panel   Result Value Ref Range    Glucose 76 74 - 99 mg/dL    Sodium 142 136 - 145 mmol/L    Potassium 3.7 3.5 - 5.3 mmol/L     Chloride 102 98 - 107 mmol/L    Bicarbonate 31 21 - 32 mmol/L    Anion Gap 13 10 - 20 mmol/L    Urea Nitrogen 30 (H) 6 - 23 mg/dL    Creatinine 6.81 (H) 0.50 - 1.05 mg/dL    eGFR 7 (L) >60 mL/min/1.73m*2    Calcium 9.3 8.6 - 10.6 mg/dL    Albumin 3.7 3.4 - 5.0 g/dL    Alkaline Phosphatase 102 33 - 110 U/L    Total Protein 6.6 6.4 - 8.2 g/dL    AST 14 9 - 39 U/L    Bilirubin, Total 0.5 0.0 - 1.2 mg/dL    ALT 8 7 - 45 U/L   Troponin I, High Sensitivity   Result Value Ref Range    Troponin I, High Sensitivity 40 (H) 0 - 34 ng/L   B-Type Natriuretic Peptide   Result Value Ref Range    BNP 2,101 (H) 0 - 99 pg/mL   Blood Gas Venous Full Panel   Result Value Ref Range    POCT pH, Venous 7.44 (H) 7.33 - 7.43 pH    POCT pCO2, Venous 53 (H) 41 - 51 mm Hg    POCT pO2, Venous 42 35 - 45 mm Hg    POCT SO2, Venous 64 45 - 75 %    POCT Oxy Hemoglobin, Venous 62.5 45.0 - 75.0 %    POCT Hematocrit Calculated, Venous 29.0 (L) 36.0 - 46.0 %    POCT Sodium, Venous 141 136 - 145 mmol/L    POCT Potassium, Venous 3.8 3.5 - 5.3 mmol/L    POCT Chloride, Venous 105 98 - 107 mmol/L    POCT Ionized Calicum, Venous 1.24 1.10 - 1.33 mmol/L    POCT Glucose, Venous 78 74 - 99 mg/dL    POCT Lactate, Venous 0.7 0.4 - 2.0 mmol/L    POCT Base Excess, Venous 10.4 (H) -2.0 - 3.0 mmol/L    POCT HCO3 Calculated, Venous 36.0 (H) 22.0 - 26.0 mmol/L    POCT Hemoglobin, Venous 9.5 (L) 12.0 - 16.0 g/dL    POCT Anion Gap, Venous 4.0 (L) 10.0 - 25.0 mmol/L    Patient Temperature 37.0 degrees Celsius    FiO2 21 %   Influenza A, and B PCR   Result Value Ref Range    Flu A Result Not Detected Not Detected    Flu B Result Not Detected Not Detected   Transthoracic Echo (TTE) Complete   Result Value Ref Range    AV pk malu 1.85 m/s    LVOT diam 1.90 cm    LV Biplane EF 64 %    MV avg E/e' ratio 19.04     MV E/A ratio 1.27     LA vol index A/L 42.7 ml/m2    Tricuspid annular plane systolic excursion 1.7 cm    RV free wall pk S' 10.40 cm/s    LV GLS -11.8 %    LVIDd 4.60  cm    RVSP 52.3 mmHg    Aortic Valve Area by Continuity of Peak Velocity 2.11 cm2    AV pk grad 13.7 mmHg    LV A4C EF 68.5    Blood Gas Venous Full Panel   Result Value Ref Range    POCT pH, Venous 7.41 7.33 - 7.43 pH    POCT pCO2, Venous 52 (H) 41 - 51 mm Hg    POCT pO2, Venous 48 (H) 35 - 45 mm Hg    POCT SO2, Venous 74 45 - 75 %    POCT Oxy Hemoglobin, Venous 72.2 45.0 - 75.0 %    POCT Hematocrit Calculated, Venous 28.0 (L) 36.0 - 46.0 %    POCT Sodium, Venous 142 136 - 145 mmol/L    POCT Potassium, Venous 3.4 (L) 3.5 - 5.3 mmol/L    POCT Chloride, Venous 106 98 - 107 mmol/L    POCT Ionized Calicum, Venous 1.27 1.10 - 1.33 mmol/L    POCT Glucose, Venous 92 74 - 99 mg/dL    POCT Lactate, Venous 0.8 0.4 - 2.0 mmol/L    POCT Base Excess, Venous 7.3 (H) -2.0 - 3.0 mmol/L    POCT HCO3 Calculated, Venous 33.0 (H) 22.0 - 26.0 mmol/L    POCT Hemoglobin, Venous 9.2 (L) 12.0 - 16.0 g/dL    POCT Anion Gap, Venous 6.0 (L) 10.0 - 25.0 mmol/L    Patient Temperature 37.0 degrees Celsius    FiO2 21 %   Transthoracic Echo (TTE) Limited   Result Value Ref Range    BSA 1.41 m2   POCT GLUCOSE   Result Value Ref Range    POCT Glucose 159 (H) 74 - 99 mg/dL   CBC and Auto Differential   Result Value Ref Range    WBC 4.8 4.4 - 11.3 x10*3/uL    nRBC 0.0 0.0 - 0.0 /100 WBCs    RBC 3.12 (L) 4.00 - 5.20 x10*6/uL    Hemoglobin 8.9 (L) 12.0 - 16.0 g/dL    Hematocrit 27.0 (L) 36.0 - 46.0 %    MCV 87 80 - 100 fL    MCH 28.5 26.0 - 34.0 pg    MCHC 33.0 32.0 - 36.0 g/dL    RDW 15.9 (H) 11.5 - 14.5 %    Platelets 188 150 - 450 x10*3/uL    Neutrophils % 87.8 40.0 - 80.0 %    Immature Granulocytes %, Automated 0.4 0.0 - 0.9 %    Lymphocytes % 8.7 13.0 - 44.0 %    Monocytes % 1.9 2.0 - 10.0 %    Eosinophils % 0.8 0.0 - 6.0 %    Basophils % 0.4 0.0 - 2.0 %    Neutrophils Absolute 4.25 1.20 - 7.70 x10*3/uL    Immature Granulocytes Absolute, Automated 0.02 0.00 - 0.70 x10*3/uL    Lymphocytes Absolute 0.42 (L) 1.20 - 4.80 x10*3/uL    Monocytes Absolute  0.09 (L) 0.10 - 1.00 x10*3/uL    Eosinophils Absolute 0.04 0.00 - 0.70 x10*3/uL    Basophils Absolute 0.02 0.00 - 0.10 x10*3/uL   Renal Function Panel   Result Value Ref Range    Glucose 151 (H) 74 - 99 mg/dL    Sodium 140 136 - 145 mmol/L    Potassium 4.3 3.5 - 5.3 mmol/L    Chloride 103 98 - 107 mmol/L    Bicarbonate 25 21 - 32 mmol/L    Anion Gap 16 10 - 20 mmol/L    Urea Nitrogen 36 (H) 6 - 23 mg/dL    Creatinine 8.06 (H) 0.50 - 1.05 mg/dL    eGFR 6 (L) >60 mL/min/1.73m*2    Calcium 9.3 8.6 - 10.6 mg/dL    Phosphorus 4.2 2.5 - 4.9 mg/dL    Albumin 3.6 3.4 - 5.0 g/dL   Magnesium   Result Value Ref Range    Magnesium 2.26 1.60 - 2.40 mg/dL   Coagulation Screen   Result Value Ref Range    Protime 12.5 9.8 - 12.8 seconds    INR 1.1 0.9 - 1.1    aPTT 35 27 - 38 seconds   Type And Screen   Result Value Ref Range    ABO TYPE AB     Rh TYPE NEG     ANTIBODY SCREEN NEG              Malnutrition          I agree with the dietitian's malnutrition diagnosis.      Assessment/Plan   Principal Problem:    Shortness of breath    Ms. Stacey Heath is a 53 year old female with ESRD 2/2 HTN and T2DM and poorly controlled hypertension presenting with SOB in setting of hypertensive emergency despite stated medication and dialysis adherence. Admitted to CICU for management of hypertensive emergency in nitroglycerin gtt.      Updates 04/04:  - Added esmolol drip  - Started home medications   - C/s nephrology for dialysis needs  - CTA for eval of aortic dissection  - Endocrine follow up for secondary HTN work up     NEUROLOGIC  #Headache  -secondary to hypertension vs nitroglycerin gtt  -wean nitroglycerin gtt as tolerated  -tylenol 975mg tid scheduled     #Nausea  -home reglan resumed  -patient says zofran does not work for her  -consider compazine, Qtc 450 4/3/24     CARDIOVASCULAR  #Hypertensive emergency  #Pulmonary edema  -Presented with /99, max at 265/88  -goal reduce SBP by 25% in first 24hrs, goal is SBP  ~190  -nitroglycerin gtt, wean as tolerated  - BP still in the 200s today so added esmolol drip   -continue home regimen of amlodipine 10mg daily, coreg 25mg bid, clonidine 0.1mg bid, hydralazine 50mg tid, imdur ER 30mg daily, valsartan 160mg bid  -continue home atorvastatin 80mg      #Concern for aortic dissection  - Had chest pain initially when she came in  -follow up CT angio with fem runoff  -patient with contrast allergy and receiving pre-treatment per institutional protocol, plan for CT angio at 10:45     #HFpEF  ::Echo 6/2023 with EF 55-60%, repeat done 4/3/24  - CONCLUSIONS:   1. Left ventricular systolic function is normal with a 60-65% estimated ejection fraction.   2. Moderately increased left ventricular septal thickness.   3. Spectral Doppler shows a pseudonormal pattern of left ventricular diastolic filling.   4. The left ventricular posterior wall thickness is moderately increased.   5. There is severe concentric left ventricular hypertrophy.   6. The left atrium is mild to moderately dilated.   7. There is moderate mitral annular calcification.   8. Moderate tricuspid regurgitation visualized.   9. Moderately elevated right ventricular systolic pressure.  10. Compared with the prior exam from 6/19/2023, there are no significant changes except the RVSP has increased from 33mmHg to 52.3mmHg.  11. Strain values are abnormal, which imply subclinical myocardial dysfunction.     PULMONARY  #AHRF 2/2 volume overload/moderate pulmonary edema  -HD as scheuled, nephro c/s   -goal O2 sat >94%, titrate oxygen as needed     #COPD?   -following with pulm fellow clinic at , no PFTs yet to confirm COPD diagnosis  -planned for outpatient CT chest, PFTs, and sleep study  -continue home albuterol prn and breo ellipta     #History of Covid-19  -diagnosed on last admission 2/28/24  -no need for isolation     RENAL/  #ESRD on dialysis T/Th/Sat via RUE AVF, makes 1 cup urine daily  #history of kidney  stones  #Hypervolemia  -c/s nephro   -dry weight reported as 52kg  -renally dose meds, avoid nephrotoxic medications. Patient did receive contrast for CTA aorta     #Renal appointment 4/4/24 missed while inpatient  -had been scheduled with Dr. Barraza, will need to  be rescheduled     GASTROINTESTINAL  - Chronic diarrhea, described as coming for a few weeks post prandial happens only after she eats something and she has 4-5 Bms that are watery, no BM as long as she is not eating and it resolves on its own      ENDOCRINE  #T2DM c/b retinopathy, nephropathy and neuropathy  -no medications     HEME/ONC  #Anemia of chronic disease I/s/o ESRD  -on Epo outpatient   -daily cbc     INFECTIOUS DISEASE  ZAIN     MSK/DERM  ZAIN        F: caution, hypervolemic  E: prn, ESRD  N: renal diet  A: L radial arterial line 4/3/24, RUE AVF  DVT ppx: SQH  GI ppx: home PPI    Code Status: Full code (confirmed on admission)  Surrogate Medical Decision-maker:      Daughter, Diya Jang ([id number]) - [phone]  and Rohini Piña     [phone]              Savanah Randall MD

## 2024-04-04 NOTE — ED PROCEDURE NOTE
Procedure  Insert arterial line    Performed by: YONIS Rodríguez  Authorized by: Annabella Castro MD    Consent:     Consent obtained:  Verbal    Consent given by:  Patient    Risks, benefits, and alternatives were discussed: yes      Risks discussed:  Bleeding, ischemia, repeat procedure, infection and pain  Universal protocol:     Procedure explained and questions answered to patient or proxy's satisfaction: yes      Relevant documents present and verified: yes      Test results available: yes      Site/side marked: yes      Immediately prior to procedure, a time out was called: yes      Patient identity confirmed:  Verbally with patient, hospital-assigned identification number and arm band  Indications:     Indications: hemodynamic monitoring    Pre-procedure details:     Skin preparation:  Chlorhexidine    Preparation: Patient was prepped and draped in sterile fashion    Sedation:     Sedation type:  None  Anesthesia:     Anesthesia method:  Local infiltration    Local anesthetic:  Lidocaine 1% w/o epi  Procedure details:     Location:  L radial    Fede's test performed: yes      Fede's test abnormal: no      Needle gauge:  20 G    Placement technique:  Ultrasound guided    Number of attempts:  3    Transducer: waveform confirmed    Post-procedure details:     Post-procedure:  Secured with tape, sterile dressing applied and sutured    CMS:  Normal    Procedure completion:  Tolerated  Comments:      2 attempts by resident (unsuccessful), 1 attempt by myself (successful)               YONIS Rodríguez  04/03/24 [id number]

## 2024-04-04 NOTE — PROGRESS NOTES
Social Work Transitional Care Note (Chart review)  - ICU TREATMENT PLAN: The patient was admitted on 4/3 for hypertensive urgency ESRD dialysis on T/Th/Sat [facility]  - Payer: [employer]    - Support: Daughters Rohini Keane and significant other Hai  -  Planned Disposition: Pending medical outcome and rehab recommendation  - Barriers to Discharge: none at this time  -Anticipated Date of Discharge: 4/8  CESAR Tripathi, NAREN  UPDATE:  Nephrologist: Dr. Garcia   Clinical updates were faxed to [facility]  fax   NAREN Tripathi

## 2024-04-05 ENCOUNTER — DOCUMENTATION (OUTPATIENT)
Dept: BEHAVIORAL HEALTH | Facility: CLINIC | Age: 54
End: 2024-04-05
Payer: COMMERCIAL

## 2024-04-05 ENCOUNTER — TELEPHONE (OUTPATIENT)
Dept: TRANSPLANT | Facility: HOSPITAL | Age: 54
End: 2024-04-05
Payer: COMMERCIAL

## 2024-04-05 PROBLEM — Z99.2 ESRD (END STAGE RENAL DISEASE) ON DIALYSIS (MULTI): Status: ACTIVE | Noted: 2022-01-10

## 2024-04-05 LAB
ALBUMIN SERPL BCP-MCNC: 3.3 G/DL (ref 3.4–5)
ANION GAP SERPL CALC-SCNC: 12 MMOL/L (ref 10–20)
APTT PPP: 32 SECONDS (ref 27–38)
BASOPHILS # BLD AUTO: 0.02 X10*3/UL (ref 0–0.1)
BASOPHILS NFR BLD AUTO: 0.3 %
BUN SERPL-MCNC: 26 MG/DL (ref 6–23)
CALCIUM SERPL-MCNC: 9.2 MG/DL (ref 8.6–10.6)
CHLORIDE SERPL-SCNC: 101 MMOL/L (ref 98–107)
CO2 SERPL-SCNC: 29 MMOL/L (ref 21–32)
CREAT SERPL-MCNC: 5.03 MG/DL (ref 0.5–1.05)
EGFRCR SERPLBLD CKD-EPI 2021: 10 ML/MIN/1.73M*2
EOSINOPHIL # BLD AUTO: 0 X10*3/UL (ref 0–0.7)
EOSINOPHIL NFR BLD AUTO: 0 %
ERYTHROCYTE [DISTWIDTH] IN BLOOD BY AUTOMATED COUNT: 16 % (ref 11.5–14.5)
GLUCOSE SERPL-MCNC: 142 MG/DL (ref 74–99)
HCT VFR BLD AUTO: 24.6 % (ref 36–46)
HGB BLD-MCNC: 7.9 G/DL (ref 12–16)
IMM GRANULOCYTES # BLD AUTO: 0.02 X10*3/UL (ref 0–0.7)
IMM GRANULOCYTES NFR BLD AUTO: 0.3 % (ref 0–0.9)
INR PPP: 1.1 (ref 0.9–1.1)
LYMPHOCYTES # BLD AUTO: 0.77 X10*3/UL (ref 1.2–4.8)
LYMPHOCYTES NFR BLD AUTO: 11.8 %
MAGNESIUM SERPL-MCNC: 2.05 MG/DL (ref 1.6–2.4)
MCH RBC QN AUTO: 28.2 PG (ref 26–34)
MCHC RBC AUTO-ENTMCNC: 32.1 G/DL (ref 32–36)
MCV RBC AUTO: 88 FL (ref 80–100)
MONOCYTES # BLD AUTO: 0.5 X10*3/UL (ref 0.1–1)
MONOCYTES NFR BLD AUTO: 7.7 %
NEUTROPHILS # BLD AUTO: 5.21 X10*3/UL (ref 1.2–7.7)
NEUTROPHILS NFR BLD AUTO: 79.9 %
NRBC BLD-RTO: 0 /100 WBCS (ref 0–0)
PHOSPHATE SERPL-MCNC: 3.4 MG/DL (ref 2.5–4.9)
PLATELET # BLD AUTO: 168 X10*3/UL (ref 150–450)
POTASSIUM SERPL-SCNC: 4.3 MMOL/L (ref 3.5–5.3)
PROTHROMBIN TIME: 12.3 SECONDS (ref 9.8–12.8)
RBC # BLD AUTO: 2.8 X10*6/UL (ref 4–5.2)
SODIUM SERPL-SCNC: 138 MMOL/L (ref 136–145)
WBC # BLD AUTO: 6.5 X10*3/UL (ref 4.4–11.3)

## 2024-04-05 PROCEDURE — 80069 RENAL FUNCTION PANEL: CPT | Performed by: STUDENT IN AN ORGANIZED HEALTH CARE EDUCATION/TRAINING PROGRAM

## 2024-04-05 PROCEDURE — 2500000004 HC RX 250 GENERAL PHARMACY W/ HCPCS (ALT 636 FOR OP/ED): Performed by: STUDENT IN AN ORGANIZED HEALTH CARE EDUCATION/TRAINING PROGRAM

## 2024-04-05 PROCEDURE — 2020000001 HC ICU ROOM DAILY

## 2024-04-05 PROCEDURE — 85025 COMPLETE CBC W/AUTO DIFF WBC: CPT

## 2024-04-05 PROCEDURE — 37799 UNLISTED PX VASCULAR SURGERY: CPT

## 2024-04-05 PROCEDURE — 2500000001 HC RX 250 WO HCPCS SELF ADMINISTERED DRUGS (ALT 637 FOR MEDICARE OP)

## 2024-04-05 PROCEDURE — 2500000002 HC RX 250 W HCPCS SELF ADMINISTERED DRUGS (ALT 637 FOR MEDICARE OP, ALT 636 FOR OP/ED): Mod: MUE | Performed by: STUDENT IN AN ORGANIZED HEALTH CARE EDUCATION/TRAINING PROGRAM

## 2024-04-05 PROCEDURE — 94640 AIRWAY INHALATION TREATMENT: CPT

## 2024-04-05 PROCEDURE — 2500000004 HC RX 250 GENERAL PHARMACY W/ HCPCS (ALT 636 FOR OP/ED)

## 2024-04-05 PROCEDURE — 85610 PROTHROMBIN TIME: CPT | Performed by: STUDENT IN AN ORGANIZED HEALTH CARE EDUCATION/TRAINING PROGRAM

## 2024-04-05 PROCEDURE — 8010000001 HC DIALYSIS - HEMODIALYSIS PER DAY

## 2024-04-05 PROCEDURE — 2500000001 HC RX 250 WO HCPCS SELF ADMINISTERED DRUGS (ALT 637 FOR MEDICARE OP): Performed by: STUDENT IN AN ORGANIZED HEALTH CARE EDUCATION/TRAINING PROGRAM

## 2024-04-05 PROCEDURE — 97161 PT EVAL LOW COMPLEX 20 MIN: CPT | Mod: GP

## 2024-04-05 PROCEDURE — 99232 SBSQ HOSP IP/OBS MODERATE 35: CPT | Performed by: STUDENT IN AN ORGANIZED HEALTH CARE EDUCATION/TRAINING PROGRAM

## 2024-04-05 PROCEDURE — 99233 SBSQ HOSP IP/OBS HIGH 50: CPT

## 2024-04-05 PROCEDURE — 83735 ASSAY OF MAGNESIUM: CPT | Performed by: STUDENT IN AN ORGANIZED HEALTH CARE EDUCATION/TRAINING PROGRAM

## 2024-04-05 PROCEDURE — 97165 OT EVAL LOW COMPLEX 30 MIN: CPT | Mod: GO

## 2024-04-05 PROCEDURE — 2500000002 HC RX 250 W HCPCS SELF ADMINISTERED DRUGS (ALT 637 FOR MEDICARE OP, ALT 636 FOR OP/ED): Performed by: STUDENT IN AN ORGANIZED HEALTH CARE EDUCATION/TRAINING PROGRAM

## 2024-04-05 RX ORDER — ISOSORBIDE MONONITRATE 60 MG/1
60 TABLET, EXTENDED RELEASE ORAL DAILY
Status: DISCONTINUED | OUTPATIENT
Start: 2024-04-05 | End: 2024-04-07

## 2024-04-05 RX ORDER — DOCUSATE SODIUM 100 MG/1
100 CAPSULE, LIQUID FILLED ORAL 2 TIMES DAILY
Status: DISCONTINUED | OUTPATIENT
Start: 2024-04-05 | End: 2024-04-14 | Stop reason: HOSPADM

## 2024-04-05 RX ORDER — ACETAMINOPHEN 325 MG/1
975 TABLET ORAL 3 TIMES DAILY PRN
Status: DISCONTINUED | OUTPATIENT
Start: 2024-04-05 | End: 2024-04-14 | Stop reason: HOSPADM

## 2024-04-05 RX ADMIN — NICARDIPINE HYDROCHLORIDE 7.5 MG/HR: 0.2 INJECTION, SOLUTION INTRAVENOUS at 05:40

## 2024-04-05 RX ADMIN — CLONIDINE HYDROCHLORIDE 0.1 MG: 0.1 TABLET ORAL at 08:04

## 2024-04-05 RX ADMIN — SUCRALFATE 1 G: 1 TABLET ORAL at 08:05

## 2024-04-05 RX ADMIN — VALSARTAN 160 MG: 160 TABLET, FILM COATED ORAL at 08:06

## 2024-04-05 RX ADMIN — HYDRALAZINE HYDROCHLORIDE 100 MG: 10 TABLET ORAL at 16:13

## 2024-04-05 RX ADMIN — HYDRALAZINE HYDROCHLORIDE 100 MG: 10 TABLET ORAL at 08:05

## 2024-04-05 RX ADMIN — CARVEDILOL 37.5 MG: 25 TABLET, FILM COATED ORAL at 08:04

## 2024-04-05 RX ADMIN — NICARDIPINE HYDROCHLORIDE 5 MG/HR: 0.2 INJECTION, SOLUTION INTRAVENOUS at 23:17

## 2024-04-05 RX ADMIN — ISOSORBIDE MONONITRATE 60 MG: 60 TABLET, EXTENDED RELEASE ORAL at 08:05

## 2024-04-05 RX ADMIN — CLONIDINE HYDROCHLORIDE 0.1 MG: 0.1 TABLET ORAL at 20:40

## 2024-04-05 RX ADMIN — DIPHENHYDRAMINE HYDROCHLORIDE 25 MG: 50 INJECTION, SOLUTION INTRAMUSCULAR; INTRAVENOUS at 00:30

## 2024-04-05 RX ADMIN — HYDRALAZINE HYDROCHLORIDE 100 MG: 10 TABLET ORAL at 20:40

## 2024-04-05 RX ADMIN — PANTOPRAZOLE SODIUM 40 MG: 40 TABLET, DELAYED RELEASE ORAL at 08:05

## 2024-04-05 RX ADMIN — ACETAMINOPHEN 975 MG: 325 TABLET ORAL at 19:44

## 2024-04-05 RX ADMIN — HEPARIN SODIUM 5000 UNITS: 5000 INJECTION INTRAVENOUS; SUBCUTANEOUS at 06:53

## 2024-04-05 RX ADMIN — AMLODIPINE BESYLATE 10 MG: 10 TABLET ORAL at 08:03

## 2024-04-05 RX ADMIN — CARVEDILOL 37.5 MG: 25 TABLET, FILM COATED ORAL at 20:40

## 2024-04-05 RX ADMIN — DIPHENHYDRAMINE HYDROCHLORIDE 25 MG: 50 INJECTION, SOLUTION INTRAMUSCULAR; INTRAVENOUS at 18:04

## 2024-04-05 RX ADMIN — HEPARIN SODIUM 5000 UNITS: 5000 INJECTION INTRAVENOUS; SUBCUTANEOUS at 16:12

## 2024-04-05 RX ADMIN — DIPHENHYDRAMINE HYDROCHLORIDE 25 MG: 50 INJECTION, SOLUTION INTRAMUSCULAR; INTRAVENOUS at 12:45

## 2024-04-05 RX ADMIN — DOCUSATE SODIUM 100 MG: 100 CAPSULE, LIQUID FILLED ORAL at 20:40

## 2024-04-05 RX ADMIN — ATORVASTATIN CALCIUM 80 MG: 80 TABLET, FILM COATED ORAL at 20:40

## 2024-04-05 RX ADMIN — SUCRALFATE 1 G: 1 TABLET ORAL at 16:12

## 2024-04-05 RX ADMIN — FLUTICASONE FUROATE AND VILANTEROL TRIFENATATE 1 PUFF: 100; 25 POWDER RESPIRATORY (INHALATION) at 10:56

## 2024-04-05 RX ADMIN — VALSARTAN 160 MG: 160 TABLET, FILM COATED ORAL at 20:40

## 2024-04-05 RX ADMIN — DIPHENHYDRAMINE HYDROCHLORIDE 25 MG: 50 INJECTION, SOLUTION INTRAMUSCULAR; INTRAVENOUS at 06:53

## 2024-04-05 RX ADMIN — ESMOLOL HYDROCHLORIDE IN SODIUM CHLORIDE 150 MCG/KG/MIN: 10 INJECTION INTRAVENOUS at 01:51

## 2024-04-05 RX ADMIN — NICARDIPINE HYDROCHLORIDE 5 MG/HR: 0.2 INJECTION, SOLUTION INTRAVENOUS at 11:53

## 2024-04-05 RX ADMIN — HEPARIN SODIUM 5000 UNITS: 5000 INJECTION INTRAVENOUS; SUBCUTANEOUS at 22:27

## 2024-04-05 RX ADMIN — ASPIRIN 81 MG: 81 TABLET, COATED ORAL at 08:03

## 2024-04-05 ASSESSMENT — PAIN - FUNCTIONAL ASSESSMENT
PAIN_FUNCTIONAL_ASSESSMENT: 0-10

## 2024-04-05 ASSESSMENT — PAIN SCALES - GENERAL
PAINLEVEL_OUTOF10: 0 - NO PAIN
PAINLEVEL_OUTOF10: 6
PAINLEVEL_OUTOF10: 0 - NO PAIN
PAINLEVEL_OUTOF10: 4
PAINLEVEL_OUTOF10: 0 - NO PAIN
PAINLEVEL_OUTOF10: 0 - NO PAIN

## 2024-04-05 ASSESSMENT — ACTIVITIES OF DAILY LIVING (ADL)
BATHING_ASSISTANCE: MINIMAL
ADL_ASSISTANCE: INDEPENDENT
ADL_ASSISTANCE: INDEPENDENT

## 2024-04-05 ASSESSMENT — COGNITIVE AND FUNCTIONAL STATUS - GENERAL
TURNING FROM BACK TO SIDE WHILE IN FLAT BAD: A LITTLE
MOBILITY SCORE: 18
DAILY ACTIVITIY SCORE: 22
WALKING IN HOSPITAL ROOM: A LITTLE
CLIMB 3 TO 5 STEPS WITH RAILING: A LITTLE
HELP NEEDED FOR BATHING: A LITTLE
STANDING UP FROM CHAIR USING ARMS: A LITTLE
MOVING TO AND FROM BED TO CHAIR: A LITTLE
TOILETING: A LITTLE
MOVING FROM LYING ON BACK TO SITTING ON SIDE OF FLAT BED WITH BEDRAILS: A LITTLE

## 2024-04-05 ASSESSMENT — PAIN DESCRIPTION - LOCATION: LOCATION: HEAD

## 2024-04-05 NOTE — CARE PLAN
Problem: Skin  Goal: Participates in plan/prevention/treatment measures  Outcome: Progressing  Flowsheets (Taken 4/5/2024 0658)  Participates in plan/prevention/treatment measures: Elevate heels  Goal: Prevent/manage excess moisture  Outcome: Progressing  Flowsheets (Taken 4/5/2024 0658)  Prevent/manage excess moisture: Moisturize dry skin  Goal: Prevent/minimize sheer/friction injuries  Outcome: Progressing  Flowsheets (Taken 4/5/2024 0658)  Prevent/minimize sheer/friction injuries:   Use pull sheet   Increase activity/out of bed for meals  Goal: Promote/optimize nutrition  Outcome: Progressing  Flowsheets (Taken 4/5/2024 0658)  Promote/optimize nutrition: Monitor/record intake including meals   The patient's goals for the shift include None.    The clinical goals for the shift include Pt will maintain SBP </= 150 throughout shift.

## 2024-04-05 NOTE — PROGRESS NOTES
"Staecy Heath is a 53 y.o. female on day 2 of admission presenting with Shortness of breath.    Subjective   Feels better this morning, headache resolved, nausea resolved, no diarrhea, no sob.     Objective   Vitals:    04/05/24 0700   BP:    Pulse: 83   Resp: 15   Temp:    SpO2: 97%       Physical Exam  Constitutional:       Appearance: Normal appearance.   HENT:      Head: Normocephalic and atraumatic.   Eyes:      Extraocular Movements: Extraocular movements intact.      Pupils: Pupils are equal, round, and reactive to light.   Cardiovascular:      Rate and Rhythm: Normal rate and regular rhythm.      Heart sounds: No murmur heard.     No gallop.   Pulmonary:      Effort: Pulmonary effort is normal. No respiratory distress.      Breath sounds: Rales present. No wheezing.   Abdominal:      General: Bowel sounds are normal.      Palpations: Abdomen is soft.      Tenderness: There is no abdominal tenderness. There is no guarding or rebound.   Skin:     General: Skin is warm and dry.   Neurological:      General: No focal deficit present.      Mental Status: She is alert and oriented to person, place, and time. Mental status is at baseline.   Psychiatric:         Mood and Affect: Mood normal.         Behavior: Behavior normal.     Last Recorded Vitals  Blood pressure (!) 232/87, pulse 83, temperature 36.2 °C (97.2 °F), resp. rate 15, height 1.397 m (4' 7\"), weight 56.7 kg (125 lb), SpO2 97 %.  Intake/Output last 3 Shifts:  I/O last 3 completed shifts:  In: 2399.3 (42.3 mL/kg) [I.V.:2199.3 (38.8 mL/kg); Other:200]  Out: 2291 (40.4 mL/kg) [Other:2291]  Weight: 56.7 kg     Relevant Results      Results for orders placed or performed during the hospital encounter of 04/03/24 (from the past 24 hour(s))   POCT GLUCOSE   Result Value Ref Range    POCT Glucose 230 (H) 74 - 99 mg/dL   POCT GLUCOSE   Result Value Ref Range    POCT Glucose 203 (H) 74 - 99 mg/dL   Renal Function Panel   Result Value Ref Range    Glucose 142 (H) " 74 - 99 mg/dL    Sodium 138 136 - 145 mmol/L    Potassium 4.3 3.5 - 5.3 mmol/L    Chloride 101 98 - 107 mmol/L    Bicarbonate 29 21 - 32 mmol/L    Anion Gap 12 10 - 20 mmol/L    Urea Nitrogen 26 (H) 6 - 23 mg/dL    Creatinine 5.03 (H) 0.50 - 1.05 mg/dL    eGFR 10 (L) >60 mL/min/1.73m*2    Calcium 9.2 8.6 - 10.6 mg/dL    Phosphorus 3.4 2.5 - 4.9 mg/dL    Albumin 3.3 (L) 3.4 - 5.0 g/dL   Magnesium   Result Value Ref Range    Magnesium 2.05 1.60 - 2.40 mg/dL   Coagulation Screen   Result Value Ref Range    Protime 12.3 9.8 - 12.8 seconds    INR 1.1 0.9 - 1.1    aPTT 32 27 - 38 seconds   CBC and Auto Differential   Result Value Ref Range    WBC 6.5 4.4 - 11.3 x10*3/uL    nRBC 0.0 0.0 - 0.0 /100 WBCs    RBC 2.80 (L) 4.00 - 5.20 x10*6/uL    Hemoglobin 7.9 (L) 12.0 - 16.0 g/dL    Hematocrit 24.6 (L) 36.0 - 46.0 %    MCV 88 80 - 100 fL    MCH 28.2 26.0 - 34.0 pg    MCHC 32.1 32.0 - 36.0 g/dL    RDW 16.0 (H) 11.5 - 14.5 %    Platelets 168 150 - 450 x10*3/uL    Neutrophils % 79.9 40.0 - 80.0 %    Immature Granulocytes %, Automated 0.3 0.0 - 0.9 %    Lymphocytes % 11.8 13.0 - 44.0 %    Monocytes % 7.7 2.0 - 10.0 %    Eosinophils % 0.0 0.0 - 6.0 %    Basophils % 0.3 0.0 - 2.0 %    Neutrophils Absolute 5.21 1.20 - 7.70 x10*3/uL    Immature Granulocytes Absolute, Automated 0.02 0.00 - 0.70 x10*3/uL    Lymphocytes Absolute 0.77 (L) 1.20 - 4.80 x10*3/uL    Monocytes Absolute 0.50 0.10 - 1.00 x10*3/uL    Eosinophils Absolute 0.00 0.00 - 0.70 x10*3/uL    Basophils Absolute 0.02 0.00 - 0.10 x10*3/uL             Malnutrition          I agree with the dietitian's malnutrition diagnosis.      Assessment/Plan   Principal Problem:    Shortness of breath      Ms. Stacey Heath is a 53 year old female with ESRD 2/2 HTN and T2DM and poorly controlled hypertension presenting with SOB in setting of hypertensive emergency despite stated medication and dialysis adherence. Admitted to CICU for management of hypertensive emergency.     Updates  04/05:  - Increased coreg to 37.5 BID, hydral to 100 TID, and imdur to 60 daily   - Hold amlodipine (to be decided tomorrow if we'll continue with amlodipine or switch to nifedipine depending on if her BP is controlled)   - Wean off nicardipine drip        NEUROLOGIC  #Headache   -Resolved  -tylenol 975mg tid prn      #Nausea  -home reglan resumed  -patient says zofran does not work for her  -consider compazine, Qtc 450 4/3/24     CARDIOVASCULAR  #Hypertensive emergency  #Pulmonary edema  -Presented with /99, max at 265/88  - Running in the 140s-150s currently   -nitroglycerin gtt and esmolo gtt weaned off, currently on nicardipine 7.5   - c/w clonidine 0.1mg bid [id number] bid  - Increased coreg to 37.5 BID, hydral to 100 TID, and imdur to 60 daily   - Hold amlodipine (to be decided tomorrow if we'll continue with amlodipine or switch to nifedipine depending on if her BP is controlled)   -continue home atorvastatin 80mg      #Concern for aortic dissection  - Had chest pain initially when she came in  - CTA angio unremarkable, no chest pain currently      #HFpEF  ::Echo 6/2023 with EF 55-60%, repeat done 4/3/24  - CONCLUSIONS:   1. Left ventricular systolic function is normal with a 60-65% estimated ejection fraction.   2. Moderately increased left ventricular septal thickness.   3. Spectral Doppler shows a pseudonormal pattern of left ventricular diastolic filling.   4. The left ventricular posterior wall thickness is moderately increased.   5. There is severe concentric left ventricular hypertrophy.   6. The left atrium is mild to moderately dilated.   7. There is moderate mitral annular calcification.   8. Moderate tricuspid regurgitation visualized.   9. Moderately elevated right ventricular systolic pressure.  10. Compared with the prior exam from 6/19/2023, there are no significant changes except the RVSP has increased from 33mmHg to 52.3mmHg.  11. Strain values are abnormal, which imply subclinical  myocardial dysfunction.     PULMONARY  #AHRF 2/2 volume overload/moderate pulmonary edema  -HD as scheuled, nephro on board   -goal O2 sat >94%, titrate oxygen as needed     #COPD?   -following with pulm fellow clinic at , no PFTs yet to confirm COPD diagnosis  -planned for outpatient CT chest, PFTs, and sleep study  -continue home albuterol prn and breo ellipta     #History of Covid-19  -diagnosed on last admission 2/28/24  -no need for isolation     RENAL/  #ESRD on dialysis T/Th/Sat via RUE AVF, makes 1 cup urine daily  #history of kidney stones  #Hypervolemia  -Nephro on board  - Dialysis per schedule   -dry weight reported as 52kg  -renally dose meds, avoid nephrotoxic medications. Patient did receive contrast for CTA aorta     #Renal appointment 4/4/24 missed while inpatient  -had been scheduled with Dr. Barraza, will need to  be rescheduled     GASTROINTESTINAL  - Chronic diarrhea, described as coming for a few weeks post prandial happens only after she eats something and she has 4-5 Bms that are watery, no BM as long as she is not eating and it resolves on its own   - Currently resolved, defer further workup      ENDOCRINE  #T2DM c/b retinopathy, nephropathy and neuropathy  -no medications  - Glucose 70s-140s     HEME/ONC  #Anemia of chronic disease I/s/o ESRD  -on Epo outpatient   -daily cbc     INFECTIOUS DISEASE  ZAIN     MSK/DERM  ZAIN        F: caution, hypervolemic  E: prn, ESRD  N: renal diet  A: L radial arterial line 4/3/24, RUE AVF  DVT ppx: SQH  GI ppx: home PPI    Code Status: Full code (confirmed on admission)  Surrogate Medical Decision-maker:      Daughter, Diya Jang ([phone]) - [phone]  and Rohini Piña     [phone]                Savanah Randall MD

## 2024-04-05 NOTE — PROGRESS NOTES
Occupational Therapy    Evaluation    Patient Name: Stacey Heath  MRN: [id number]  Today's Date: 4/5/2024  Time Calculation  Start Time: 0957  Stop Time: 1018  Time Calculation (min): 21 min    Assessment  IP OT Assessment  OT Assessment: Overall decreased strength and endurance limiting full Indep with task completion  Prognosis: Good  Evaluation/Treatment Tolerance: Patient tolerated treatment well  End of Session Communication: Bedside nurse  End of Session Patient Position: Up in chair, Alarm off, not on at start of session  Plan:  Treatment Interventions: ADL retraining, Functional transfer training, UE strengthening/ROM, Endurance training, Neuromuscular reeducation  OT Frequency: 2 times per week  OT Discharge Recommendations: Low intensity level of continued care  OT Recommended Transfer Status:  (CGA)  OT - OK to Discharge: Yes    Subjective   Current Problem:  1. Shortness of breath  Transthoracic Echo (TTE) Complete      2. Hypertensive emergency  Transthoracic Echo (TTE) Limited    Transthoracic Echo (TTE) Limited    CANCELED: Transthoracic Echo (TTE) Limited    CANCELED: Transthoracic Echo (TTE) Limited      3. Coronary artery disease involving native coronary artery of native heart without angina pectoris  Transthoracic Echo (TTE) Limited    Transthoracic Echo (TTE) Limited    CANCELED: Transthoracic Echo (TTE) Limited    CANCELED: Transthoracic Echo (TTE) Limited      4. Secondary hypertension        5. Dialysis patient (CMS/[facility])        6. Nonrheumatic aortic valve disorder, unspecified  Transthoracic Echo (TTE) Limited      7. Resistant hypertension  Referral to Endocrinology        General:  Reason for Referral: Hypertensive urgency  Past Medical History Relevant to Rehab: ESRD 2/2 HTN and diabetes on HD T/TH/Satm poorly controlled HTN, DM2, COPD  Co-Treatment: PT  Co-Treatment Reason: to maximize mobility safely 2/2 impaired activity tolerance  Prior to Session Communication: Bedside nurse  Patient  Position Received: Bed, 3 rail up, Alarm off, not on at start of session  General Comment: Pt is pleasant and cooperative and agreeable to participating. (7.5 Cardene)   Precautions:  Medical Precautions: Cardiac precautions, Fall precautions, Oxygen therapy device and L/min (2L O2)  Vital Signs:  Heart Rate: 71 (68)  Resp: 16 (13)  SpO2: 100 % (100)  BP: (!) 132/49 (136/56)  MAP (mmHg): 74 (79)  BP Method: Arterial line  Pain:  Pain Assessment  Pain Score: 0 - No pain  Lines/Tubes/Drains:  Arterial Line 04/03/24 Left Radial (Active)   Number of days: 1         Objective   Cognition:  Overall Cognitive Status: Within Functional Limits  Orientation Level: Oriented X4        Hayes Agitation Sedation Scale  Hayes Agitation Sedation Scale (RASS): Alert and calm  Home Living:  Type of Home: House  Lives With: Significant other  Home Adaptive Equipment: Walker rolling or standard, Cane  Home Layout: Two level, Bed/bath upstairs, Stairs to alternate level with rails  Alternate Level Stairs-Number of Steps: 12  Home Access: Stairs to enter with rails  Entrance Stairs-Number of Steps: 5  Bathroom Shower/Tub: Tub/shower unit  Bathroom Equipment: None   Prior Function:  Level of [place]: Independent with ADLs and functional transfers  Receives Help From: Family, Home health  ADL Assistance: Independent  Homemaking Assistance:  (Spouse assists at baseline)  Ambulatory Assistance: Independent (Uses SC/FWW prn)  IADL History:     ADL:  Grooming Assistance: Stand by  Bathing Assistance: Minimal  UE Dressing Assistance: Modified independent (Device)  LE Dressing Assistance: Stand by  Toileting Assistance with Device: Stand by  Activity Tolerance:  Endurance: Decreased tolerance for upright activites  Early Mobility/Exercise Safety Screen: Proceed with mobilization - No exclusion criteria met  Balance:  Static Sitting Balance  Static Sitting-Level of Assistance: Close supervision  Bed Mobility/Transfers: Bed Mobility  Bed  Mobility: Yes  Bed Mobility 1  Bed Mobility 1: Supine to sitting  Level of Assistance 1: Minimum assistance  Functional Mobility  Functional Mobility Performed: Yes  Functional Mobility 1  Device 1: No device  Assistance 1: Contact guard  Comments 1: Pt ambulated through room and hallway >75'. Use of mackey rail for occasional steadying assist.   and Transfers  Transfer: Yes  Transfer 1  Transfer From 1: Sit to  Transfer to 1: Stand  Technique 1: Sit to stand, Stand to sit  Transfer Level of Assistance 1: Contact guard  IADL's:      Vision:     and Vision - Complex Assessment  Ocular Range of Motion: Within Functional Limits  Sensation:  Light Touch: No apparent deficits  Strength:  Strength Comments: BUE strength observed through task completion to be WFL  Perception:  Inattention/Neglect: Appears intact  Coordination:  Movements are Fluid and Coordinated: Yes   Hand Function:  Hand Function  Gross Grasp: Functional  Coordination: Functional  Extremities:  ,  ,  , and      Outcome Measures: [facility] Daily Activity  Putting on and taking off regular lower body clothing: None  Bathing (including washing, rinsing, drying): A little  Putting on and taking off regular upper body clothing: None  Toileting, which includes using toilet, bedpan or urinal: A little  Taking care of personal grooming such as brushing teeth: None  Eating Meals: None  Daily Activity - Total Score: 22    Confusion Assessment Method-ICU (CAM-ICU)  Feature 3: Altered Level of Consciousness: Negative   ICU Mobility Screen  Early Mobility/Exercise Safety Screen: Proceed with mobilization - No exclusion criteria met,   Hayes Agitation Sedation Scale  Hayes Agitation Sedation Scale (RASS): Alert and calm      Education Documentation  Body Mechanics, taught by Antonietta Kebede OT at 4/5/2024 11:13 AM.  Learner: Patient  Readiness: Acceptance  Method: Explanation  Response: Verbalizes Understanding, Demonstrated Understanding    ADL Training, taught by  Antonietta Kebede OT at 4/5/2024 11:13 AM.  Learner: Patient  Readiness: Acceptance  Method: Explanation  Response: Verbalizes Understanding, Demonstrated Understanding    Education Comments  No comments found.        Goals:     Encounter Problems       Encounter Problems (Active)       ADLs       Patient will complete toileting, including clothing management and hygiene, with MOD I in order to maximize functional Indep with task completion.        Start:  04/05/24    Expected End:  04/19/24               COGNITION/SAFETY       Pt will identify and incorporate 3 EC/WS strategies into daily routines for increased safety and Indep.        Start:  04/05/24    Expected End:  04/19/24               EXERCISE/STRENGTHENING       Pt will increase endurance to tolerate 15-20min of activity with no more than 1 rest break in order to increase ability to engage in ADL completion.        Start:  04/05/24    Expected End:  04/19/24               MOBILITY       Pt will demo increased functional mobility to tolerate tasks necessary to complete ADL routine with MOD I.       Start:  04/05/24    Expected End:  04/19/24 04/05/24 at 11:13 AM   Antonietta Kebede OT   Rehab Office: [phone]

## 2024-04-05 NOTE — PROGRESS NOTES
Physical Therapy    Physical Therapy Evaluation    Patient Name: Stacey Heath  MRN: [id number]  Today's Date: 4/5/2024   Time Calculation  Start Time: 0957  Stop Time: 1018  Time Calculation (min): 21 min    Assessment/Plan   PT Assessment  PT Assessment Results: Decreased strength, Decreased endurance, Impaired balance, Decreased mobility  Rehab Prognosis: Excellent  End of Session Communication: Bedside nurse  End of Session Patient Position: Up in chair, Alarm off, not on at start of session  IP OR SWING BED PT PLAN  Inpatient or Swing Bed: Inpatient  PT Plan  Treatment/Interventions: Bed mobility, Transfer training, Gait training, Stair training, Balance training, Strengthening, Endurance training  PT Plan: Skilled PT  PT Frequency: 3 times per week  PT Discharge Recommendations: Low intensity level of continued care  PT Recommended Transfer Status: Assist x1  PT - OK to Discharge: Yes    Subjective     General Visit Information:  General  Reason for Referral: Hypertensive urgency  Past Medical History Relevant to Rehab: ESRD 2/2 HTN and diabetes on HD T/TH/Satm poorly controlled HTN, DM2, COPD  Family/Caregiver Present: No  Co-Treatment: OT  Co-Treatment Reason: to maximize mobility safely 2/2 impaired activity tolerance  Prior to Session Communication: Bedside nurse  Patient Position Received: Bed, 3 rail up, Alarm off, not on at start of session  General Comment: Pt pleasant and cooperative. Telemetry, Arterial line, Cardene 7.5 mcg, 2 L O2 NC.    Home Living:  Home Living  Type of Home: House  Lives With:  ([facility])  Home Adaptive Equipment: Walker rolling or standard, Cane  Home Layout: Two level, Bed/bath upstairs, Stairs to alternate level with rails  Alternate Level Stairs-Rails: Both  Alternate Level Stairs-Number of Steps: 12  Home Access: Stairs to enter with rails  Entrance Stairs-Rails: Both  Entrance Stairs-Number of Steps: 5  Bathroom Shower/Tub: Tub/shower unit    Prior Level of Function:  Prior  Function Per Pt/Caregiver Report  Level of [place]: Independent with ADLs and functional transfers  ADL Assistance: Independent  Homemaking Assistance: Independent  Ambulatory Assistance: Independent (Short distance ambulation with no AD, cane, and FWW. Denies falls)    Precautions:  Precautions  Medical Precautions: Cardiac precautions, Fall precautions, Oxygen therapy device and L/min    Vital Signs:  Vital Signs  Heart Rate:  (PRE: 72; POST: 68)  Resp:  (PRE: 17; POST: 24)  SpO2:  (PRE: 100%, POST: 99%)  BP:  (PRE: 138/50, POST: 132/58)    Objective     Pain:  Pain Assessment  Pain Assessment: 0-10  Pain Score: 0 - No pain    Cognition:  Cognition  Overall Cognitive Status: Within Functional Limits  Arousal/Alertness: Appropriate responses to stimuli  Orientation Level: Oriented X4  Following Commands: Follows all commands and directions without difficulty    General Assessments:   Activity Tolerance  Early Mobility/Exercise Safety Screen: Proceed with mobilization - No exclusion criteria met    Sensation  Light Touch: No apparent deficits    Strength  Strength Comments: B LE strength >/= 4-/5 throughout  Strength  Strength Comments: B LE strength >/= 4-/5 throughout    Static Sitting Balance  Static Sitting-Balance Support: No upper extremity supported, Feet supported  Static Sitting-Level of Assistance: Independent  Dynamic Sitting Balance  Dynamic Sitting-Balance Support: No upper extremity supported, Feet supported  Dynamic Sitting-Comments: CGA    Static Standing Balance  Static Standing-Balance Support: No upper extremity supported  Static Standing-Level of Assistance: Contact guard  Dynamic Standing Balance  Dynamic Standing-Balance Support: No upper extremity supported  Dynamic Standing-Comments: CGA    Functional Assessments:  Bed Mobility  Bed Mobility: Yes  Bed Mobility 1  Bed Mobility 1: Supine to sitting  Level of Assistance 1: Minimum assistance (x1 person)  Bed Mobility Comments 1: HOB  elevated    Transfers  Transfer: Yes  Transfer 1  Transfer From 1: Sit to  Transfer to 1: Stand  Technique 1: Sit to stand  Transfer Level of Assistance 1: Contact guard  Transfers 2  Transfer From 2: Stand to  Transfer to 2: Sit  Technique 2: Stand to sit  Transfer Level of Assistance 2: Contact guard    Ambulation/Gait Training  Ambulation/Gait Training Performed: Yes  Ambulation/Gait Training 1  Surface 1: Level tile  Device 1: No device (pt reaching for support on the railing)  Assistance 1: Contact guard  Quality of Gait 1: Inconsistent stride length, Narrow base of support  Comments/Distance (ft) 1: 60 ft    Extremity/Trunk Assessments:  RLE   RLE : Within Functional Limits  LLE   LLE : Within Functional Limits    Outcome Measures:  [facility] Basic Mobility  Turning from your back to your side while in a flat bed without using bedrails: A little  Moving from lying on your back to sitting on the side of a flat bed without using bedrails: A little  Moving to and from bed to chair (including a wheelchair): A little  Standing up from a chair using your arms (e.g. wheelchair or bedside chair): A little  To walk in hospital room: A little  Climbing 3-5 steps with railing: A little  Basic Mobility - Total Score: 18    Confusion Assessment Method-ICU (CAM-ICU)  Feature 1: Acute Onset or Fluctuating Course: Negative  Overall CAM-ICU: Negative    FSS-ICU  Ambulation: Walks >/ or equal to 50 feet with any assistance x1  Rolling: Supervision or set-up only  Sitting: Supervision or set-up only  Transfer Sit-to-Stand: Minimal assistance (performs 75% or more of task)  Transfer Supine-to-Sit: Minimal assistance (performs 75% or more of task)  Total Score: 20    ICU Mobility Screen  Early Mobility/Exercise Safety Screen: Proceed with mobilization - No exclusion criteria met  E = Exercise and Early Mobility  Early Mobility/Exercise Safety Screen: Proceed with mobilization - No exclusion criteria met  Current Activity: Ambulating  in mackey    Encounter Problems       Encounter Problems (Active)       Balance       Pt will score >24 on Tinetti for low risk of falls (Progressing)       Start:  04/05/24    Expected End:  04/19/24               Mobility       Patient will ambulate with LRAD >500 ft with supervision (Progressing)       Start:  04/05/24    Expected End:  04/19/24            Patient will ascend and descend 12 stairs with B handrails with supervision (Progressing)       Start:  04/05/24    Expected End:  04/19/24            Pt will complete 6MWT with LRAD and supervision and amb >600 ft (Progressing)       Start:  04/05/24    Expected End:  04/19/24               PT Transfers       Patient will perform bed mobility indep (Progressing)       Start:  04/05/24    Expected End:  04/19/24            Patient will transfer sit to and from stand indep (Progressing)       Start:  04/05/24    Expected End:  04/19/24               Pain - Adult            Education Documentation  Mobility Training, taught by Tri Caldera, PT at 4/5/2024 10:48 AM.  Learner: Patient  Readiness: Acceptance  Method: Explanation  Response: Verbalizes Understanding    Education Comments  No comments found.    Signed by Tri Caldera DPT

## 2024-04-05 NOTE — PROGRESS NOTES
Stacey Heath is a 53 y.o. female on day 2 of admission presenting with Shortness of breath.      Subjective   4/5: Pt seen sitting in chair. Had some cramping towards end of yesterday's dialysis sessions - says that occurs whenever more than 2L removed. On 3L nc. Breathing overall is better. BP still high - on IV cardene drip.       Objective          Vitals 24HR  Heart Rate:  []   Temp:  [36 °C (96.8 °F)-36.7 °C (98.1 °F)]   Resp:  [11-29]   BP: (132-232)/(49-87)   Weight:  [56.7 kg (125 lb)]   SpO2:  [97 %-100 %]       Intake/Output last 3 Shifts:    Intake/Output Summary (Last 24 hours) at 4/5/2024 1520  Last data filed at 4/5/2024 1357  Gross per 24 hour   Intake 2308.59 ml   Output 2291 ml   Net 17.59 ml       Physical Exam  General appearance: AAOx3. No acute distress  Eyes: non-icteric  Skin: no apparent rash  Heart: rhythm regular.   Lungs: bibasilar crackles, 3L nc  Abdomen: soft, nt/nd  Extremities: trace leg edema bilat  : no Nick  Neuro: No FND  ACCESS: RUE AVF    Relevant Results    Current Facility-Administered Medications:     acetaminophen (Tylenol) tablet 975 mg, 975 mg, oral, TID PRN, Savanah Randall MD    albuterol 2.5 mg /3 mL (0.083 %) nebulizer solution 1.25 mg, 1.25 mg, nebulization, q6h PRN, Maria L Barba MD    aspirin EC tablet 81 mg, 81 mg, oral, Daily, Maria L Barba MD, 81 mg at 04/05/24 0803    atorvastatin (Lipitor) tablet 80 mg, 80 mg, oral, Nightly, Maria L Barba MD, 80 mg at 04/04/24 2109    carvedilol (Coreg) tablet 37.5 mg, 37.5 mg, oral, BID, Savanah Randall MD, 37.5 mg at 04/05/24 0804    cloNIDine (Catapres) tablet 0.1 mg, 0.1 mg, oral, BID, Maria L Barba MD, 0.1 mg at 04/05/24 0804    diphenhydrAMINE (BENADryl) injection 25 mg, 25 mg, intravenous, q6h, Savanah Randall MD, 25 mg at 04/05/24 1245    esmolol (Brevibloc) bolus from bag 25,650 mcg, 500 mcg/kg, intravenous, Once **FOLLOWED BY** esmolol (Brevibloc) 2500 mg in sodium chloride 250 mL (10 mg/mL) infusion (premix),   mcg/kg/min, intravenous, Continuous, Savanah Randall MD, Stopped at 04/05/24 0430    fluticasone (Flonase) nasal spray 2 spray, 2 spray, Each Nostril, Daily, Maria L Barba MD    fluticasone furoate-vilanteroL (Breo Ellipta) 100-25 mcg/dose inhaler 1 puff, 1 puff, inhalation, Daily, Maria L Barba MD, 1 puff at 04/05/24 1056    heparin (porcine) injection 5,000 Units, 5,000 Units, subcutaneous, q8h BELEN, Maria L Barba MD, 5,000 Units at 04/05/24 0653    hydrALAZINE (Apresoline) tablet 100 mg, 100 mg, oral, TID, Savanah Randall MD, 100 mg at 04/05/24 0805    hydrOXYzine HCL (Atarax) tablet 10 mg, 10 mg, oral, q6h PRN, Maria L Barba MD    isosorbide mononitrate ER (Imdur) 24 hr tablet 60 mg, 60 mg, oral, Daily, Savanah Randall MD, 60 mg at 04/05/24 0805    metoclopramide (Reglan) tablet 5 mg, 5 mg, oral, q6h PRN, Maria L Barba MD    multivitamin with minerals 1 tablet, 1 tablet, oral, Daily, Maria L Barba MD    niCARdipine (Cardene) 40 mg in sodium chloride 200 mL (0.2 mg/mL) infusion (premix), 2.5-15 mg/hr, intravenous, Continuous, Savanah Randall MD, Last Rate: 12.5 mL/hr at 04/05/24 1400, 2.5 mg/hr at 04/05/24 1400    nitroglycerin (Tridil) 50 mg in dextrose 5% 250 mL (0.2 mg/mL) infusion (premix), 0-200 mcg/min, intravenous, Continuous, Maria L Barba MD, Stopped at 04/05/24 0510    oxygen (O2) therapy, , inhalation, Continuous PRN - O2/gases, Maria L Barba MD, 3 L/min at 04/05/24 1056    pantoprazole (ProtoNix) EC tablet 40 mg, 40 mg, oral, Daily, Maria L Barba MD, 40 mg at 04/05/24 0805    perflutren lipid microspheres (Definity) injection 0.5-10 mL of dilution, 0.5-10 mL of dilution, intravenous, Once in imaging, Maria L Barba MD    perflutren protein A microsphere (Optison) injection 0.5 mL, 0.5 mL, intravenous, Once in imaging, Maria L Barba MD    prochlorperazine (Compazine) tablet 10 mg, 10 mg, oral, q6h PRN, 10 mg at 04/04/24 0145 **OR** prochlorperazine (Compazine) injection 10 mg, 10 mg, intravenous, q6h PRN **OR**  prochlorperazine (Compazine) suppository 25 mg, 25 mg, rectal, q12h PRN, Maria L Barba MD    sodium chloride (Ocean) 0.65 % nasal spray 1 spray, 1 spray, Each Nostril, 4x daily PRN, Maria L Barba MD    sucralfate (Carafate) tablet 1 g, 1 g, oral, BID AC, Maria L Barba MD, 1 g at 04/05/24 0805    sulfur hexafluoride microsphr (Lumason) injection 24.28 mg, 2 mL, intravenous, Once in imaging, Maria L Barba MD    SUMAtriptan (Imitrex) tablet 50 mg, 50 mg, oral, Once PRN, Maria L Barba MD    valsartan (Diovan) tablet 160 mg, 160 mg, oral, BID, Maria L Barba MD, 160 mg at 04/05/24 0806     Assessment  Expand All Collapse All    Reason For Consult  ESRD, HTN emergency     History Of Present Illness  Stacey Heath is a 53 y.o. female with PMH ESRD on HD TTS via RUE AVF, DM, HTN, HFpEF, and COPD presented on 4/3/2024 for SOB and headache. Found to have elevated BP requiring IV nitroglycerin drip and admitted to CICU. Being worked up to rule out aortic dissection. Nephrology consulted for dialysis needs.     Pt seen resting in bed. Appears comfortable. On nc. Still on IV nitroglycerin drip. Her Nephrologist is Dr. Garcia and she dialyzes at [facility].      Past Medical History  She has a past medical history of Acute pancreatitis (01/20/2024), Acute pyelonephritis due to bacteria (01/16/2023), Acute renal failure (CMS/[facility]) (02/25/2023), YARA (acute kidney injury) (CMS/[facility]) (08/03/2021), Bilateral shoulder pain (02/25/2023), Breast pain (05/18/2021), Cardiac/pericardial tamponade (01/20/2024), Chronic renal failure syndrome (01/10/2022), Community acquired pneumonia (11/03/2021), Dependence on renal dialysis (CMS/[facility]) (11/21/2022), Disorder of sweat glands (02/25/2023), Dry eye syndrome of bilateral lacrimal glands (03/07/2017), Essential (primary) hypertension (12/27/2022), Flash pulmonary edema (CMS/HCC) (01/20/2024), History of acute pancreatitis (12/21/2020), Low grade squamous intraepithelial lesion (LGSIL) on cervicovaginal  cytologic smear (02/25/2023), Migraine headache (02/25/2023), Migraines, Obstruction of urinary stent (CMS/[facility]) (02/25/2023), 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 (02/25/2023), Other conditions influencing health status (09/01/2021), Pain in upper limb (01/20/2024), Periorbital cellulitis (06/13/2014), Pleural effusion (12/12/2023), Pneumonia (04/11/2023), Sepsis (CMS/[facility]) (01/20/2024), Status migrainosus (08/21/2012), Type 2 myocardial infarction (CMS/[facility]) (01/20/2024), Unspecified diastolic (congestive) heart failure (CMS/[facility]) (11/21/2022), and Vaginal dryness (07/29/2022).     Surgical History  She has a past surgical history that includes Tubal ligation (03/07/2017); Other surgical history (03/07/2017); Appendectomy (03/07/2017); Other surgical history (03/07/2017); Other surgical history (12/08/2021); Other surgical history (12/08/2021); CT angio abdomen w and or wo IV IV contrast (4/23/2023); and IR venogram dialysis (8/5/2021).     Social History  She reports that she has quit smoking. Her smoking use included cigarettes. She has never used smokeless tobacco. She reports that she does not currently use alcohol. She reports that she does not use drugs.     Family History  Family History          Family History   Problem Relation Name Age of Onset    Other (Cerebrovascular Accident) Father        Heart failure Paternal Grandmother        Breast cancer Paternal Grandmother        Other (Primary Cervical Cancer) Paternal Grandmother        Diabetes Paternal Grandfather                Allergies  Iodine, Bioflavonoids, Codeine, Flowers, Hydromorphone, and Shellfish containing products     Review of Systems  SOB, headache     Physical Exam  General appearance: AAOx3. No distress  Eyes: non-icteric  Skin: no apparent rash  Heart: rhythm regular.   Lungs: CTA bilat.  no wheezing/crackles, nc  Abdomen: soft, nt/nd  Extremities: no edema bilat  : no Nick  Neuro: No FND  ACCESS: RUE AVF           I&O 24HR    "  Intake/Output Summary (Last 24 hours) at 4/4/2024 1130  Last data filed at 4/4/2024 0600      Gross per 24 hour   Intake 128.2 ml   Output --   Net 128.2 ml         Vitals 24HR  Heart Rate:  []   Temp:  [35.8 °C (96.4 °F)-36.3 °C (97.3 °F)]   Resp:  [14-27]   BP: (184-265)/(68-97)   Height:  [139.7 cm (4' 7\")]   Weight:  [51.3 kg (113 lb)-51.3 kg (113 lb 1.5 oz)]   SpO2:  [92 %-100 %]            Relevant Results     Current Facility-Administered Medications:     acetaminophen (Tylenol) tablet 975 mg, 975 mg, oral, TID, Maria L Barba MD, 975 mg at 04/04/24 0902    albuterol 2.5 mg /3 mL (0.083 %) nebulizer solution 1.25 mg, 1.25 mg, nebulization, q6h PRN, Maria L Barba MD    amLODIPine (Norvasc) tablet 10 mg, 10 mg, oral, Daily, Maria L Barba MD, 10 mg at 04/04/24 0634    aspirin EC tablet 81 mg, 81 mg, oral, Daily, Maria L Barba MD, 81 mg at 04/04/24 0900    atorvastatin (Lipitor) tablet 80 mg, 80 mg, oral, Nightly, Maria L Barba MD, 80 mg at 04/04/24 0324    carvedilol (Coreg) tablet 25 mg, 25 mg, oral, BID, Maria L Barba MD, 25 mg at 04/04/24 0506    cloNIDine (Catapres) tablet 0.1 mg, 0.1 mg, oral, BID, Maria L Barba MD, 0.1 mg at 04/04/24 0634    esmolol (Brevibloc) bolus from bag 25,650 mcg, 500 mcg/kg, intravenous, Once **FOLLOWED BY** esmolol (Brevibloc) 2500 mg in sodium chloride 250 mL (10 mg/mL) infusion (premix),  mcg/kg/min, intravenous, Continuous, Savanah Randall MD, Last Rate: 46.2 mL/hr at 04/04/24 0900, 150 mcg/kg/min at 04/04/24 0900    fluticasone (Flonase) nasal spray 2 spray, 2 spray, Each Nostril, Daily, Maria L Barba MD    fluticasone furoate-vilanteroL (Breo Ellipta) 100-25 mcg/dose inhaler 1 puff, 1 puff, inhalation, Daily, Maria L Barba MD    heparin (porcine) injection 5,000 Units, 5,000 Units, subcutaneous, q8h BELEN, Maria L Braba MD, 5,000 Units at 04/03/24 2220    hydrALAZINE (Apresoline) tablet 50 mg, 50 mg, oral, TID, Maria L Barba MD, 50 mg at 04/04/24 0903    hydrOXYzine HCL (Atarax) tablet " 10 mg, 10 mg, oral, q6h PRN, Maria L Barba MD    isosorbide mononitrate ER (Imdur) 24 hr tablet 30 mg, 30 mg, oral, Daily, Maria L Barba MD, 30 mg at 04/04/24 0324    metoclopramide (Reglan) tablet 5 mg, 5 mg, oral, q6h PRN, Maria L Barba MD    multivitamin with minerals 1 tablet, 1 tablet, oral, Daily, Maria L Barba MD    nitroglycerin (Tridil) 50 mg in dextrose 5% 250 mL (0.2 mg/mL) infusion (premix), 0-200 mcg/min, intravenous, Continuous, Maria L Barba MD, Last Rate: 24 mL/hr at 04/04/24 1000, 80 mcg/min at 04/04/24 1000    oxygen (O2) therapy, , inhalation, Continuous PRN - O2/gases, Maria L Barba MD    pantoprazole (ProtoNix) EC tablet 40 mg, 40 mg, oral, Daily, Maria L Barba MD, 40 mg at 04/04/24 0903    perflutren lipid microspheres (Definity) injection 0.5-10 mL of dilution, 0.5-10 mL of dilution, intravenous, Once in imaging, Maria L Barba MD    perflutren protein A microsphere (Optison) injection 0.5 mL, 0.5 mL, intravenous, Once in imaging, Maria L Barba MD    prochlorperazine (Compazine) tablet 10 mg, 10 mg, oral, q6h PRN, 10 mg at 04/04/24 0145 **OR** prochlorperazine (Compazine) injection 10 mg, 10 mg, intravenous, q6h PRN **OR** prochlorperazine (Compazine) suppository 25 mg, 25 mg, rectal, q12h PRN, Maria L Barba MD    sodium chloride (Ocean) 0.65 % nasal spray 1 spray, 1 spray, Each Nostril, 4x daily PRN, Maria L Barba MD    sucralfate (Carafate) tablet 1 g, 1 g, oral, BID AC, Maria L Barba MD, 1 g at 04/04/24 0905    sulfur hexafluoride microsphr (Lumason) injection 24.28 mg, 2 mL, intravenous, Once in imaging, Maria L Barba MD    SUMAtriptan (Imitrex) tablet 50 mg, 50 mg, oral, Once PRN, Maria L Barba MD    valsartan (Diovan) tablet 160 mg, 160 mg, oral, BID, Maria L Barba MD, 160 mg at 04/04/24 0634         Assessment  Stacey Heath is a 53 y.o. female with PMH ESRD on HD TTS via RUE AVF, DM, HTN, HFpEF, and COPD presented on 4/3/2024 for SOB and headache. Found to have elevated BP requiring IV nitroglycerin drip and  admitted to CICU. Being worked up to rule out aortic dissection. Nephrology consulted for dialysis needs.     ESRD on HD TTS via RUE AVF  - Her Nephrologist is Dr. Garcia and she dialyzes at [facility]. EDW 55kg.  - BP high - on IV cardene drip  - on 3L nc  - electrolytes acceptable  - s/p HD on 4/4 with 2.2L fluid removed; had cramps  - pt typically cramps when <2L UF done at one time     HTN  - Remains on IV cardene drip  - home BP meds: amlodipine, coreg, clonidine, hydralazine, imdur, valsartan, torsemide  - CTA negative for aortic dissection     Anemia of CKD - Hb 8.9; gets mircera at her dialysis unit  Mineral Bone Disease - on renal MV     Plan  - still volume overloaded- plan on ultrafiltration today x 2h with goal 1.5L fluid removal  - will do HD tomorrow as per TTS schedule     D/w Dr. Swapna Medina MD  Nephrology Fellow PGY 5  Contact via secure chat  Nephrology Pager # [phone] ([phone])

## 2024-04-05 NOTE — PROGRESS NOTES
Stacey Heath  Age: 53 y.o.  MRN: [id number]  Date: 4/4/2024  Location of service: in community    Program Details  Medicaid Community Clinical Case Management  Status: Enrolled  Effective Dates: 10/17/2023 - present  Responsible Staff: PORFIRIO Valderrama      Goals Reviewed:      Problem: Risk of Uncoordinated Care       Goal: Care will be Coordinated and Supported by a Multidisciplinary Team of Providers       Priority: High          Summary:  Provider met with patient while in-patient in hospital. Provider talked with patient about how she is currently doing. Patient reports that she is feeling better since being in the hospital although she is concerned about her health. Provider reached out to patient's nephrologist to reschedule her appointment that was scheduled for later this afternoon and to patient's PCP to inform him that patient is currently in-patient.     Appointment start time: 1210  Appointment completion time: 1300  Total time spent with patient (in minutes): 50      PORFIRIO Valderrmaa

## 2024-04-06 ENCOUNTER — APPOINTMENT (OUTPATIENT)
Dept: DIALYSIS | Facility: HOSPITAL | Age: 54
End: 2024-04-06
Payer: COMMERCIAL

## 2024-04-06 LAB
ALBUMIN SERPL BCP-MCNC: 3.1 G/DL (ref 3.4–5)
ANION GAP SERPL CALC-SCNC: 14 MMOL/L (ref 10–20)
APTT PPP: 36 SECONDS (ref 27–38)
BASOPHILS # BLD AUTO: 0.05 X10*3/UL (ref 0–0.1)
BASOPHILS NFR BLD AUTO: 1 %
BUN SERPL-MCNC: 48 MG/DL (ref 6–23)
CALCIUM SERPL-MCNC: 8.7 MG/DL (ref 8.6–10.6)
CHLORIDE SERPL-SCNC: 105 MMOL/L (ref 98–107)
CO2 SERPL-SCNC: 27 MMOL/L (ref 21–32)
CREAT SERPL-MCNC: 7.1 MG/DL (ref 0.5–1.05)
EGFRCR SERPLBLD CKD-EPI 2021: 6 ML/MIN/1.73M*2
EOSINOPHIL # BLD AUTO: 0.38 X10*3/UL (ref 0–0.7)
EOSINOPHIL NFR BLD AUTO: 7.6 %
ERYTHROCYTE [DISTWIDTH] IN BLOOD BY AUTOMATED COUNT: 15.9 % (ref 11.5–14.5)
GLUCOSE BLD MANUAL STRIP-MCNC: 134 MG/DL (ref 74–99)
GLUCOSE SERPL-MCNC: 83 MG/DL (ref 74–99)
HCT VFR BLD AUTO: 26.1 % (ref 36–46)
HGB BLD-MCNC: 8.5 G/DL (ref 12–16)
IMM GRANULOCYTES # BLD AUTO: 0.06 X10*3/UL (ref 0–0.7)
IMM GRANULOCYTES NFR BLD AUTO: 1.2 % (ref 0–0.9)
INR PPP: 1 (ref 0.9–1.1)
LYMPHOCYTES # BLD AUTO: 0.87 X10*3/UL (ref 1.2–4.8)
LYMPHOCYTES NFR BLD AUTO: 17.4 %
MAGNESIUM SERPL-MCNC: 2.17 MG/DL (ref 1.6–2.4)
MCH RBC QN AUTO: 29 PG (ref 26–34)
MCHC RBC AUTO-ENTMCNC: 32.6 G/DL (ref 32–36)
MCV RBC AUTO: 89 FL (ref 80–100)
MONOCYTES # BLD AUTO: 0.42 X10*3/UL (ref 0.1–1)
MONOCYTES NFR BLD AUTO: 8.4 %
NEUTROPHILS # BLD AUTO: 3.23 X10*3/UL (ref 1.2–7.7)
NEUTROPHILS NFR BLD AUTO: 64.4 %
NRBC BLD-RTO: 0 /100 WBCS (ref 0–0)
PHOSPHATE SERPL-MCNC: 4 MG/DL (ref 2.5–4.9)
PLATELET # BLD AUTO: 163 X10*3/UL (ref 150–450)
POTASSIUM SERPL-SCNC: 4.6 MMOL/L (ref 3.5–5.3)
PROTHROMBIN TIME: 11.1 SECONDS (ref 9.8–12.8)
RBC # BLD AUTO: 2.93 X10*6/UL (ref 4–5.2)
SODIUM SERPL-SCNC: 141 MMOL/L (ref 136–145)
WBC # BLD AUTO: 5 X10*3/UL (ref 4.4–11.3)

## 2024-04-06 PROCEDURE — 8010000001 HC DIALYSIS - HEMODIALYSIS PER DAY

## 2024-04-06 PROCEDURE — 83735 ASSAY OF MAGNESIUM: CPT | Performed by: STUDENT IN AN ORGANIZED HEALTH CARE EDUCATION/TRAINING PROGRAM

## 2024-04-06 PROCEDURE — 99291 CRITICAL CARE FIRST HOUR: CPT

## 2024-04-06 PROCEDURE — 2500000002 HC RX 250 W HCPCS SELF ADMINISTERED DRUGS (ALT 637 FOR MEDICARE OP, ALT 636 FOR OP/ED): Performed by: STUDENT IN AN ORGANIZED HEALTH CARE EDUCATION/TRAINING PROGRAM

## 2024-04-06 PROCEDURE — 2500000002 HC RX 250 W HCPCS SELF ADMINISTERED DRUGS (ALT 637 FOR MEDICARE OP, ALT 636 FOR OP/ED): Mod: MUE

## 2024-04-06 PROCEDURE — 2500000002 HC RX 250 W HCPCS SELF ADMINISTERED DRUGS (ALT 637 FOR MEDICARE OP, ALT 636 FOR OP/ED): Mod: MUE | Performed by: STUDENT IN AN ORGANIZED HEALTH CARE EDUCATION/TRAINING PROGRAM

## 2024-04-06 PROCEDURE — 94640 AIRWAY INHALATION TREATMENT: CPT

## 2024-04-06 PROCEDURE — 2500000001 HC RX 250 WO HCPCS SELF ADMINISTERED DRUGS (ALT 637 FOR MEDICARE OP)

## 2024-04-06 PROCEDURE — 2500000002 HC RX 250 W HCPCS SELF ADMINISTERED DRUGS (ALT 637 FOR MEDICARE OP, ALT 636 FOR OP/ED)

## 2024-04-06 PROCEDURE — 82947 ASSAY GLUCOSE BLOOD QUANT: CPT

## 2024-04-06 PROCEDURE — 90945 DIALYSIS ONE EVALUATION: CPT | Performed by: INTERNAL MEDICINE

## 2024-04-06 PROCEDURE — 85610 PROTHROMBIN TIME: CPT | Performed by: STUDENT IN AN ORGANIZED HEALTH CARE EDUCATION/TRAINING PROGRAM

## 2024-04-06 PROCEDURE — 2500000001 HC RX 250 WO HCPCS SELF ADMINISTERED DRUGS (ALT 637 FOR MEDICARE OP): Performed by: STUDENT IN AN ORGANIZED HEALTH CARE EDUCATION/TRAINING PROGRAM

## 2024-04-06 PROCEDURE — 2500000004 HC RX 250 GENERAL PHARMACY W/ HCPCS (ALT 636 FOR OP/ED)

## 2024-04-06 PROCEDURE — 2500000004 HC RX 250 GENERAL PHARMACY W/ HCPCS (ALT 636 FOR OP/ED): Performed by: STUDENT IN AN ORGANIZED HEALTH CARE EDUCATION/TRAINING PROGRAM

## 2024-04-06 PROCEDURE — 37799 UNLISTED PX VASCULAR SURGERY: CPT

## 2024-04-06 PROCEDURE — 85025 COMPLETE CBC W/AUTO DIFF WBC: CPT

## 2024-04-06 PROCEDURE — 80069 RENAL FUNCTION PANEL: CPT | Performed by: STUDENT IN AN ORGANIZED HEALTH CARE EDUCATION/TRAINING PROGRAM

## 2024-04-06 PROCEDURE — 2020000001 HC ICU ROOM DAILY

## 2024-04-06 RX ORDER — NIFEDIPINE 30 MG/1
30 TABLET, FILM COATED, EXTENDED RELEASE ORAL
Status: DISCONTINUED | OUTPATIENT
Start: 2024-04-07 | End: 2024-04-06

## 2024-04-06 RX ORDER — NIFEDIPINE 30 MG/1
30 TABLET, FILM COATED, EXTENDED RELEASE ORAL
Status: DISCONTINUED | OUTPATIENT
Start: 2024-04-06 | End: 2024-04-06

## 2024-04-06 RX ORDER — NIFEDIPINE 60 MG/1
60 TABLET, FILM COATED, EXTENDED RELEASE ORAL
Status: DISCONTINUED | OUTPATIENT
Start: 2024-04-06 | End: 2024-04-07

## 2024-04-06 RX ORDER — CARVEDILOL 25 MG/1
50 TABLET ORAL 2 TIMES DAILY
Status: DISCONTINUED | OUTPATIENT
Start: 2024-04-06 | End: 2024-04-14 | Stop reason: HOSPADM

## 2024-04-06 RX ORDER — CARVEDILOL 12.5 MG/1
12.5 TABLET ORAL ONCE
Status: COMPLETED | OUTPATIENT
Start: 2024-04-06 | End: 2024-04-06

## 2024-04-06 RX ORDER — NIFEDIPINE 30 MG/1
TABLET, FILM COATED, EXTENDED RELEASE ORAL
Status: COMPLETED
Start: 2024-04-06 | End: 2024-04-06

## 2024-04-06 RX ORDER — NIFEDIPINE 60 MG/1
60 TABLET, FILM COATED, EXTENDED RELEASE ORAL
Status: DISCONTINUED | OUTPATIENT
Start: 2024-04-07 | End: 2024-04-06

## 2024-04-06 RX ADMIN — PANTOPRAZOLE SODIUM 40 MG: 40 TABLET, DELAYED RELEASE ORAL at 11:19

## 2024-04-06 RX ADMIN — NICARDIPINE HYDROCHLORIDE 7.5 MG/HR: 0.2 INJECTION, SOLUTION INTRAVENOUS at 06:20

## 2024-04-06 RX ADMIN — HYDRALAZINE HYDROCHLORIDE 100 MG: 10 TABLET ORAL at 20:40

## 2024-04-06 RX ADMIN — NIFEDIPINE 60 MG: 60 TABLET, FILM COATED, EXTENDED RELEASE ORAL at 13:12

## 2024-04-06 RX ADMIN — CLONIDINE HYDROCHLORIDE 0.1 MG: 0.1 TABLET ORAL at 11:21

## 2024-04-06 RX ADMIN — Medication 1 TABLET: at 11:20

## 2024-04-06 RX ADMIN — HYDRALAZINE HYDROCHLORIDE 100 MG: 10 TABLET ORAL at 15:45

## 2024-04-06 RX ADMIN — ACETAMINOPHEN 975 MG: 325 TABLET ORAL at 19:01

## 2024-04-06 RX ADMIN — NICARDIPINE HYDROCHLORIDE 10 MG/HR: 0.2 INJECTION, SOLUTION INTRAVENOUS at 18:37

## 2024-04-06 RX ADMIN — CARVEDILOL 37.5 MG: 25 TABLET, FILM COATED ORAL at 11:20

## 2024-04-06 RX ADMIN — CLONIDINE HYDROCHLORIDE 0.1 MG: 0.1 TABLET ORAL at 20:40

## 2024-04-06 RX ADMIN — DIPHENHYDRAMINE HYDROCHLORIDE 25 MG: 50 INJECTION, SOLUTION INTRAMUSCULAR; INTRAVENOUS at 13:49

## 2024-04-06 RX ADMIN — ASPIRIN 81 MG: 81 TABLET, COATED ORAL at 11:20

## 2024-04-06 RX ADMIN — SUCRALFATE 1 G: 1 TABLET ORAL at 11:21

## 2024-04-06 RX ADMIN — NICARDIPINE HYDROCHLORIDE 10 MG/HR: 0.2 INJECTION, SOLUTION INTRAVENOUS at 20:59

## 2024-04-06 RX ADMIN — VALSARTAN 160 MG: 160 TABLET, FILM COATED ORAL at 20:40

## 2024-04-06 RX ADMIN — CARVEDILOL 50 MG: 25 TABLET, FILM COATED ORAL at 20:40

## 2024-04-06 RX ADMIN — ATORVASTATIN CALCIUM 80 MG: 80 TABLET, FILM COATED ORAL at 20:40

## 2024-04-06 RX ADMIN — ACETAMINOPHEN 975 MG: 325 TABLET ORAL at 06:19

## 2024-04-06 RX ADMIN — HEPARIN SODIUM 5000 UNITS: 5000 INJECTION INTRAVENOUS; SUBCUTANEOUS at 06:21

## 2024-04-06 RX ADMIN — DIPHENHYDRAMINE HYDROCHLORIDE 25 MG: 50 INJECTION, SOLUTION INTRAMUSCULAR; INTRAVENOUS at 23:44

## 2024-04-06 RX ADMIN — DIPHENHYDRAMINE HYDROCHLORIDE 25 MG: 50 INJECTION, SOLUTION INTRAMUSCULAR; INTRAVENOUS at 18:30

## 2024-04-06 RX ADMIN — ISOSORBIDE MONONITRATE 60 MG: 60 TABLET, EXTENDED RELEASE ORAL at 11:19

## 2024-04-06 RX ADMIN — NICARDIPINE HYDROCHLORIDE 15 MG/HR: 0.2 INJECTION, SOLUTION INTRAVENOUS at 11:36

## 2024-04-06 RX ADMIN — HYDRALAZINE HYDROCHLORIDE 100 MG: 10 TABLET ORAL at 11:20

## 2024-04-06 RX ADMIN — HEPARIN SODIUM 5000 UNITS: 5000 INJECTION INTRAVENOUS; SUBCUTANEOUS at 15:44

## 2024-04-06 RX ADMIN — DIPHENHYDRAMINE HYDROCHLORIDE 25 MG: 50 INJECTION, SOLUTION INTRAMUSCULAR; INTRAVENOUS at 00:20

## 2024-04-06 RX ADMIN — HEPARIN SODIUM 5000 UNITS: 5000 INJECTION INTRAVENOUS; SUBCUTANEOUS at 23:44

## 2024-04-06 RX ADMIN — FLUTICASONE FUROATE AND VILANTEROL TRIFENATATE 1 PUFF: 100; 25 POWDER RESPIRATORY (INHALATION) at 08:34

## 2024-04-06 RX ADMIN — CARVEDILOL 12.5 MG: 12.5 TABLET, FILM COATED ORAL at 15:45

## 2024-04-06 RX ADMIN — DIPHENHYDRAMINE HYDROCHLORIDE 25 MG: 50 INJECTION, SOLUTION INTRAMUSCULAR; INTRAVENOUS at 06:20

## 2024-04-06 RX ADMIN — VALSARTAN 160 MG: 160 TABLET, FILM COATED ORAL at 11:21

## 2024-04-06 ASSESSMENT — PAIN SCALES - GENERAL
PAINLEVEL_OUTOF10: 0 - NO PAIN
PAINLEVEL_OUTOF10: 4

## 2024-04-06 ASSESSMENT — PAIN - FUNCTIONAL ASSESSMENT
PAIN_FUNCTIONAL_ASSESSMENT: 0-10

## 2024-04-06 ASSESSMENT — PAIN DESCRIPTION - LOCATION: LOCATION: HEAD

## 2024-04-06 ASSESSMENT — PAIN SCALES - WONG BAKER: WONGBAKER_NUMERICALRESPONSE: HURTS LITTLE BIT

## 2024-04-06 NOTE — PROGRESS NOTES
Stacey Heath is a 53 y.o. female on day 3 of admission presenting with Shortness of breath.      Subjective   Pt seen on dialysis. Doing well overall. Feels shortness of breath is improving.        Objective          Vitals 24HR  Heart Rate:  [59-82]   Temp:  [35.9 °C (96.6 °F)-36.7 °C (98.1 °F)]   Resp:  [12-20]   BP: (132)/(49)   Weight:  [56.7 kg (125 lb)]   SpO2:  [98 %-100 %]       Intake/Output last 3 Shifts:    Intake/Output Summary (Last 24 hours) at 4/6/2024 0837  Last data filed at 4/6/2024 0830  Gross per 24 hour   Intake 2556.47 ml   Output 4350 ml   Net -1793.53 ml         Physical Exam  General appearance: AAOx3. No acute distress  Eyes: non-icteric  Skin: no apparent rash  Heart: rhythm regular.   Lungs: CTAB  Abdomen: soft, nt/nd  Extremities: trace leg edema bilat  : no Nick  Neuro: No FND  ACCESS: RUE AVF    Relevant Results    Expand All Collapse All      Assessment  Stacey Heath is a 53 y.o. female with PMH ESRD on HD TTS via RUE AVF, DM, HTN, HFpEF, and COPD presented on 4/3/2024 for SOB and headache. Found to have elevated BP requiring IV nitroglycerin drip and admitted to CICU. Being worked up to rule out aortic dissection. Nephrology consulted for dialysis needs.     ESRD on HD TTS via RUE AVF  - Her Nephrologist is Dr. Garcia and she dialyzes at [facility]. EDW 55kg.  - BP high - remains on IV cardene drip  - on 3L nc  - electrolytes acceptable  - s/p IUF 4/5 with 1.9L UF      HTN  - Remains on IV cardene drip  - home BP meds: amlodipine, coreg, clonidine, hydralazine, imdur, valsartan, torsemide  - CTA negative for aortic dissection     Anemia of CKD - Hb 8.9; gets mircera at her dialysis unit  Mineral Bone Disease - on renal MV     Plan  - iHD today with goal 2-2.5L UF, tolerating well  -BP slowly improving, wean off nicardipine gtt as tolerated and introduce PO medications      D/w Dr. Swapna Boyce, DO  PGY 4 Nephrology Fellow   Contact via secure  hector  Nephrology Pager # [phone] ([phone])

## 2024-04-06 NOTE — PROGRESS NOTES
Stacey Heath is a 53 y.o. female on day 3 of admission presenting with hypertensive emergency.     Subjective   NAEON. Pt remains on nicardipine gtt.    Patient denies any lightheadedness, dizziness, shortness of breath, chest pain         Objective   Weight: 51.3 kg (113 lb) (04/03/24 1251)    Daily Weight  04/06/24 : 56.7 kg (125 lb)      Last Recorded Vitals  Heart Rate:  [59-82]   Temp:  [35.8 °C (96.4 °F)-36.7 °C (98.1 °F)]   Resp:  [12-20]   Weight:  [56.7 kg (125 lb)]   SpO2:  [98 %-100 %]          4/6/2024     6:55 AM 4/6/2024     7:00 AM 4/6/2024     7:09 AM 4/6/2024     7:55 AM 4/6/2024     8:00 AM 4/6/2024     9:00 AM 4/6/2024    10:00 AM   Vitals   Heart Rate  72   78 77 79   Temp 36 °C (96.8 °F)  36 °C (96.8 °F) 35.9 °C (96.6 °F)   35.8 °C (96.4 °F)   Resp  13   15 14 16     Intake/Output last 3 Shifts:  I/O last 3 completed shifts:  In: 3865.1 (68.2 mL/kg) [P.O.:200; I.V.:2865.1 (50.5 mL/kg); Other:800]  Out: 4350 (76.7 mL/kg) [Urine:550 (0.3 mL/kg/hr); Other:3800]  Weight: 56.7 kg       Intake/Output Summary (Last 24 hours) at 4/6/2024 1134  Last data filed at 4/6/2024 1000  Gross per 24 hour   Intake 3356.47 ml   Output 6750 ml   Net -3393.53 ml     Relevant Results  Results from last 7 days   Lab Units 04/06/24  0441 04/05/24  0413 04/04/24  0506   WBC AUTO x10*3/uL 5.0 6.5 4.8   HEMOGLOBIN g/dL 8.5* 7.9* 8.9*   HEMATOCRIT % 26.1* 24.6* 27.0*   PLATELETS AUTO x10*3/uL 163 168 188     Results from last 7 days   Lab Units 04/06/24  0441 04/05/24  0413 04/04/24  0506   SODIUM mmol/L 141 138 140   POTASSIUM mmol/L 4.6 4.3 4.3   CO2 mmol/L 27 29 25   ANION GAP mmol/L 14 12 16   BUN mg/dL 48* 26* 36*   CREATININE mg/dL 7.10* 5.03* 8.06*   GLUCOSE mg/dL 83 142* 151*   EGFR mL/min/1.73m*2 6* 10* 6*   MAGNESIUM mg/dL 2.17 2.05 2.26   PHOSPHORUS mg/dL 4.0 3.4 4.2      Results from last 7 days   Lab Units 04/03/24  1152   ALT U/L 8   AST U/L 14   ALK PHOS U/L 102      Results from last 7 days   Lab Units  "04/06/24  0441 04/05/24  0413 04/04/24  0506   INR  1.0 1.1 1.1     Results from last 7 days   Lab Units 04/03/24  1838 04/03/24  1152   FIO2 % 21 21     Results from last 7 days   Lab Units 04/03/24  1838 04/03/24  1152   POCT PH, VENOUS pH 7.41 7.44*   POCT PCO2, VENOUS mm Hg 52* 53*           No lab exists for component: \"BNPRESU\", \"CPKT\"          Physical Exam  Constitutional: No acute distress, resting comfortably in bed, cooperative  HEENT: Normocephalic, atraumatic, PERRLA, EOMI, moist mucous membranes, no pharyngeal erythema  Cardiovascular: RRR, normal S1/S2, systolic murmur RUSB  Pulmonary: Clear to auscultation b/l, no wheezes/crackles/rhonchi, no increased work of breathing  GI: Soft, non-tender, non-distended, normoactive bowel sounds  Neuro: A&O x3, able to move all 4 extremities spontaneously  Psych: Appropriate mood and affect     Medications  Scheduled:  aspirin, 81 mg, oral, Daily  atorvastatin, 80 mg, oral, Nightly  carvedilol, 37.5 mg, oral, BID  cloNIDine, 0.1 mg, oral, BID  diphenhydrAMINE, 25 mg, intravenous, q6h  docusate sodium, 100 mg, oral, BID  esmolol, 500 mcg/kg, intravenous, Once  fluticasone, 2 spray, Each Nostril, Daily  fluticasone furoate-vilanteroL, 1 puff, inhalation, Daily  heparin (porcine), 5,000 Units, subcutaneous, q8h BELEN  hydrALAZINE, 100 mg, oral, TID  isosorbide mononitrate ER, 60 mg, oral, Daily  multivitamin with minerals, 1 tablet, oral, Daily  pantoprazole, 40 mg, oral, Daily  perflutren lipid microspheres, 0.5-10 mL of dilution, intravenous, Once in imaging  perflutren protein A microsphere, 0.5 mL, intravenous, Once in imaging  sucralfate, 1 g, oral, BID AC  sulfur hexafluoride microsphr, 2 mL, intravenous, Once in imaging  valsartan, 160 mg, oral, BID      Continuous:   esmolol,  mcg/kg/min, Last Rate: Stopped (04/05/24 0430)  niCARdipine, 2.5-15 mg/hr, Last Rate: 5 mg/hr (04/06/24 0650)  nitroglycerin, 0-200 mcg/min, Last Rate: Stopped (04/05/24 " 0510)      PRN:  PRN medications: acetaminophen, albuterol, hydrOXYzine HCL, metoclopramide, oxygen, prochlorperazine **OR** prochlorperazine **OR** prochlorperazine, sodium chloride, SUMAtriptan            Assessment/Plan   Principal Problem:    Shortness of breath  Active Problems:    ESRD (end stage renal disease) on dialysis (CMS/[facility])    Ms. Stacey Heath is a 53 year old female with ESRD 2/2 HTN and T2DM and poorly controlled hypertension presenting with SOB in setting of hypertensive emergency despite stated medication and dialysis adherence. Admitted to CICU for management of hypertensive emergency.     Updates 4/6:   - Dialysis today per home TTS schedule   - Increase coreg to 50 mg, start nifedipine 60 mg  - Wean off nicardipine drip         NEUROLOGIC  #Headache   -Resolved  -tylenol 975mg tid prn      #Nausea  -home reglan resumed  -patient says zofran does not work for her  -consider compazine, Qtc 450 4/3/24     CARDIOVASCULAR  #Hypertensive emergency  #Pulmonary edema  -Presented with /99, max at 265/88  - Running in the 140s-160s currently   -nitroglycerin gtt and esmolo gtt weaned off, currently on nicardipine 5  - Clonidine 0.1mg bid  - [id number] bid  - Coreg to 37.5 BID  - Hydral 100 TID  - Imdur to 60 daily   - Nifedipine 60 daily   - continue home atorvastatin 80mg      #Concern for aortic dissection  - Had chest pain initially when she came in  - CTA angio unremarkable, no chest pain currently      #HFpEF  ::Echo 6/2023 with EF 55-60%, repeat done 4/3/24  - CONCLUSIONS:   1. Left ventricular systolic function is normal with a 60-65% estimated ejection fraction.   2. Moderately increased left ventricular septal thickness.   3. Spectral Doppler shows a pseudonormal pattern of left ventricular diastolic filling.   4. The left ventricular posterior wall thickness is moderately increased.   5. There is severe concentric left ventricular hypertrophy.   6. The left atrium is mild to  moderately dilated.   7. There is moderate mitral annular calcification.   8. Moderate tricuspid regurgitation visualized.   9. Moderately elevated right ventricular systolic pressure.  10. Compared with the prior exam from 6/19/2023, there are no significant changes except the RVSP has increased from 33mmHg to 52.3mmHg.  11. Strain values are abnormal, which imply subclinical myocardial dysfunction.     PULMONARY  #AHRF 2/2 volume overload/moderate pulmonary edema  -HD as scheuled, nephro on board   -goal O2 sat >94%, titrate oxygen as needed     #COPD?   -following with pulm fellow clinic at , no PFTs yet to confirm COPD diagnosis  -planned for outpatient CT chest, PFTs, and sleep study  -continue home albuterol prn and breo ellipta     #History of Covid-19  -diagnosed on last admission 2/28/24  -no need for isolation     RENAL/  #ESRD on dialysis T/Th/Sat via RUE AVF, makes 1 cup urine daily  #history of kidney stones  #Hypervolemia  -Nephro on board  -Dialysis per schedule   -dry weight reported as 52kg  -renally dose meds, avoid nephrotoxic medications. Patient did receive contrast for CTA aorta     #Renal appointment 4/4/24 missed while inpatient  -had been scheduled with Dr. Barraza, will need to  be rescheduled     GASTROINTESTINAL  -Chronic diarrhea, described as coming for a few weeks post prandial happens only after she eats something and she has 4-5 Bms that are watery, no BM as long as she is not eating and it resolves on its own   -Currently resolved, defer further workup      ENDOCRINE  #T2DM c/b retinopathy, nephropathy and neuropathy  -no medications  -Glucose 70s-140s     HEME/ONC  #Anemia of chronic disease I/s/o ESRD  -on Epo outpatient   -daily cbc     INFECTIOUS DISEASE  ZAIN     MSK/DERM  ZAIN       F: caution, hypervolemic  E: prn, ESRD  N: renal diet  A: L radial arterial line 4/3/24, RUE AVF  DVT ppx: SQH  GI ppx: home PPI    Code Status: Full code (confirmed on admission)  Surrogate  Medical Decision-maker:      Daughter, Diya Jang [phone]  and Rohini Piña     [phone]

## 2024-04-06 NOTE — CARE PLAN
Problem: Skin  Goal: Participates in plan/prevention/treatment measures  Outcome: Progressing  Flowsheets (Taken 4/6/2024 0521)  Participates in plan/prevention/treatment measures:   Increase activity/out of bed for meals   Elevate heels  Goal: Prevent/manage excess moisture  Outcome: Progressing  Flowsheets (Taken 4/6/2024 0521)  Prevent/manage excess moisture: Moisturize dry skin  Goal: Prevent/minimize sheer/friction injuries  Outcome: Progressing  Flowsheets (Taken 4/6/2024 0521)  Prevent/minimize sheer/friction injuries:   Use pull sheet   Increase activity/out of bed for meals  Goal: Promote/optimize nutrition  Outcome: Progressing  Flowsheets (Taken 4/6/2024 0521)  Promote/optimize nutrition: Monitor/record intake including meals   The patient's goals for the shift include None.    The clinical goals for the shift include Pt will maintain SBP </= 150 throughout shift.

## 2024-04-07 ENCOUNTER — APPOINTMENT (OUTPATIENT)
Dept: RADIOLOGY | Facility: HOSPITAL | Age: 54
DRG: 304 | End: 2024-04-07
Payer: COMMERCIAL

## 2024-04-07 LAB
ALBUMIN SERPL BCP-MCNC: 3.2 G/DL (ref 3.4–5)
ANION GAP SERPL CALC-SCNC: 13 MMOL/L (ref 10–20)
APTT PPP: 46 SECONDS (ref 27–38)
BASOPHILS # BLD AUTO: 0.05 X10*3/UL (ref 0–0.1)
BASOPHILS NFR BLD AUTO: 0.9 %
BUN SERPL-MCNC: 34 MG/DL (ref 6–23)
CALCIUM SERPL-MCNC: 8.8 MG/DL (ref 8.6–10.6)
CHLORIDE SERPL-SCNC: 104 MMOL/L (ref 98–107)
CO2 SERPL-SCNC: 26 MMOL/L (ref 21–32)
CREAT SERPL-MCNC: 4.74 MG/DL (ref 0.5–1.05)
EGFRCR SERPLBLD CKD-EPI 2021: 10 ML/MIN/1.73M*2
EOSINOPHIL # BLD AUTO: 0.52 X10*3/UL (ref 0–0.7)
EOSINOPHIL NFR BLD AUTO: 9.4 %
ERYTHROCYTE [DISTWIDTH] IN BLOOD BY AUTOMATED COUNT: 15.5 % (ref 11.5–14.5)
GLUCOSE SERPL-MCNC: 111 MG/DL (ref 74–99)
HCT VFR BLD AUTO: 28 % (ref 36–46)
HGB BLD-MCNC: 9.4 G/DL (ref 12–16)
IMM GRANULOCYTES # BLD AUTO: 0.05 X10*3/UL (ref 0–0.7)
IMM GRANULOCYTES NFR BLD AUTO: 0.9 % (ref 0–0.9)
INR PPP: 1 (ref 0.9–1.1)
LYMPHOCYTES # BLD AUTO: 0.77 X10*3/UL (ref 1.2–4.8)
LYMPHOCYTES NFR BLD AUTO: 13.9 %
MAGNESIUM SERPL-MCNC: 2 MG/DL (ref 1.6–2.4)
MCH RBC QN AUTO: 28.9 PG (ref 26–34)
MCHC RBC AUTO-ENTMCNC: 33.6 G/DL (ref 32–36)
MCV RBC AUTO: 86 FL (ref 80–100)
MONOCYTES # BLD AUTO: 0.44 X10*3/UL (ref 0.1–1)
MONOCYTES NFR BLD AUTO: 8 %
NEUTROPHILS # BLD AUTO: 3.7 X10*3/UL (ref 1.2–7.7)
NEUTROPHILS NFR BLD AUTO: 66.9 %
NRBC BLD-RTO: 0 /100 WBCS (ref 0–0)
PHOSPHATE SERPL-MCNC: 3.3 MG/DL (ref 2.5–4.9)
PLATELET # BLD AUTO: 169 X10*3/UL (ref 150–450)
POTASSIUM SERPL-SCNC: 4.3 MMOL/L (ref 3.5–5.3)
PROTHROMBIN TIME: 11.3 SECONDS (ref 9.8–12.8)
RBC # BLD AUTO: 3.25 X10*6/UL (ref 4–5.2)
SODIUM SERPL-SCNC: 139 MMOL/L (ref 136–145)
WBC # BLD AUTO: 5.5 X10*3/UL (ref 4.4–11.3)

## 2024-04-07 PROCEDURE — 2500000002 HC RX 250 W HCPCS SELF ADMINISTERED DRUGS (ALT 637 FOR MEDICARE OP, ALT 636 FOR OP/ED)

## 2024-04-07 PROCEDURE — 85025 COMPLETE CBC W/AUTO DIFF WBC: CPT

## 2024-04-07 PROCEDURE — 99291 CRITICAL CARE FIRST HOUR: CPT

## 2024-04-07 PROCEDURE — 2500000001 HC RX 250 WO HCPCS SELF ADMINISTERED DRUGS (ALT 637 FOR MEDICARE OP): Performed by: STUDENT IN AN ORGANIZED HEALTH CARE EDUCATION/TRAINING PROGRAM

## 2024-04-07 PROCEDURE — 2500000002 HC RX 250 W HCPCS SELF ADMINISTERED DRUGS (ALT 637 FOR MEDICARE OP, ALT 636 FOR OP/ED): Mod: MUE

## 2024-04-07 PROCEDURE — 2500000001 HC RX 250 WO HCPCS SELF ADMINISTERED DRUGS (ALT 637 FOR MEDICARE OP)

## 2024-04-07 PROCEDURE — 2500000002 HC RX 250 W HCPCS SELF ADMINISTERED DRUGS (ALT 637 FOR MEDICARE OP, ALT 636 FOR OP/ED): Performed by: STUDENT IN AN ORGANIZED HEALTH CARE EDUCATION/TRAINING PROGRAM

## 2024-04-07 PROCEDURE — 36410 VNPNXR 3YR/> PHY/QHP DX/THER: CPT

## 2024-04-07 PROCEDURE — 37799 UNLISTED PX VASCULAR SURGERY: CPT | Performed by: STUDENT IN AN ORGANIZED HEALTH CARE EDUCATION/TRAINING PROGRAM

## 2024-04-07 PROCEDURE — 2500000004 HC RX 250 GENERAL PHARMACY W/ HCPCS (ALT 636 FOR OP/ED)

## 2024-04-07 PROCEDURE — 83735 ASSAY OF MAGNESIUM: CPT | Performed by: STUDENT IN AN ORGANIZED HEALTH CARE EDUCATION/TRAINING PROGRAM

## 2024-04-07 PROCEDURE — 2500000004 HC RX 250 GENERAL PHARMACY W/ HCPCS (ALT 636 FOR OP/ED): Performed by: STUDENT IN AN ORGANIZED HEALTH CARE EDUCATION/TRAINING PROGRAM

## 2024-04-07 PROCEDURE — 85610 PROTHROMBIN TIME: CPT | Performed by: STUDENT IN AN ORGANIZED HEALTH CARE EDUCATION/TRAINING PROGRAM

## 2024-04-07 PROCEDURE — 2500000002 HC RX 250 W HCPCS SELF ADMINISTERED DRUGS (ALT 637 FOR MEDICARE OP, ALT 636 FOR OP/ED): Mod: MUE | Performed by: STUDENT IN AN ORGANIZED HEALTH CARE EDUCATION/TRAINING PROGRAM

## 2024-04-07 PROCEDURE — 2020000001 HC ICU ROOM DAILY

## 2024-04-07 PROCEDURE — C1751 CATH, INF, PER/CENT/MIDLINE: HCPCS

## 2024-04-07 PROCEDURE — 2780000003 HC OR 278 NO HCPCS

## 2024-04-07 PROCEDURE — 94640 AIRWAY INHALATION TREATMENT: CPT

## 2024-04-07 PROCEDURE — 80069 RENAL FUNCTION PANEL: CPT | Performed by: STUDENT IN AN ORGANIZED HEALTH CARE EDUCATION/TRAINING PROGRAM

## 2024-04-07 RX ORDER — LIDOCAINE HYDROCHLORIDE 10 MG/ML
5 INJECTION, SOLUTION EPIDURAL; INFILTRATION; INTRACAUDAL; PERINEURAL ONCE
Status: DISCONTINUED | OUTPATIENT
Start: 2024-04-07 | End: 2024-04-09

## 2024-04-07 RX ORDER — CLONIDINE HYDROCHLORIDE 0.1 MG/1
0.2 TABLET ORAL EVERY 8 HOURS SCHEDULED
Status: DISCONTINUED | OUTPATIENT
Start: 2024-04-07 | End: 2024-04-14 | Stop reason: HOSPADM

## 2024-04-07 RX ORDER — CLONIDINE HYDROCHLORIDE 0.1 MG/1
0.2 TABLET ORAL 2 TIMES DAILY
Status: DISCONTINUED | OUTPATIENT
Start: 2024-04-07 | End: 2024-04-07

## 2024-04-07 RX ORDER — ISOSORBIDE MONONITRATE 30 MG/1
120 TABLET, EXTENDED RELEASE ORAL DAILY
Status: DISCONTINUED | OUTPATIENT
Start: 2024-04-07 | End: 2024-04-14 | Stop reason: HOSPADM

## 2024-04-07 RX ORDER — NIFEDIPINE 90 MG/1
90 TABLET, EXTENDED RELEASE ORAL
Status: DISCONTINUED | OUTPATIENT
Start: 2024-04-07 | End: 2024-04-14 | Stop reason: HOSPADM

## 2024-04-07 RX ADMIN — PANTOPRAZOLE SODIUM 40 MG: 40 TABLET, DELAYED RELEASE ORAL at 09:22

## 2024-04-07 RX ADMIN — DIPHENHYDRAMINE HYDROCHLORIDE 25 MG: 50 INJECTION, SOLUTION INTRAMUSCULAR; INTRAVENOUS at 13:29

## 2024-04-07 RX ADMIN — DIPHENHYDRAMINE HYDROCHLORIDE 25 MG: 50 INJECTION, SOLUTION INTRAMUSCULAR; INTRAVENOUS at 19:25

## 2024-04-07 RX ADMIN — NICARDIPINE HYDROCHLORIDE 10 MG/HR: 0.2 INJECTION, SOLUTION INTRAVENOUS at 08:26

## 2024-04-07 RX ADMIN — DOCUSATE SODIUM 100 MG: 100 CAPSULE, LIQUID FILLED ORAL at 20:16

## 2024-04-07 RX ADMIN — VALSARTAN 160 MG: 160 TABLET, FILM COATED ORAL at 20:16

## 2024-04-07 RX ADMIN — NICARDIPINE HYDROCHLORIDE 10 MG/HR: 0.2 INJECTION, SOLUTION INTRAVENOUS at 04:24

## 2024-04-07 RX ADMIN — HEPARIN SODIUM 5000 UNITS: 5000 INJECTION INTRAVENOUS; SUBCUTANEOUS at 13:29

## 2024-04-07 RX ADMIN — CARVEDILOL 50 MG: 25 TABLET, FILM COATED ORAL at 09:22

## 2024-04-07 RX ADMIN — Medication 1 TABLET: at 08:25

## 2024-04-07 RX ADMIN — CARVEDILOL 50 MG: 25 TABLET, FILM COATED ORAL at 20:15

## 2024-04-07 RX ADMIN — DIPHENHYDRAMINE HYDROCHLORIDE 25 MG: 50 INJECTION, SOLUTION INTRAMUSCULAR; INTRAVENOUS at 06:42

## 2024-04-07 RX ADMIN — SUCRALFATE 1 G: 1 TABLET ORAL at 16:57

## 2024-04-07 RX ADMIN — CLONIDINE HYDROCHLORIDE 0.2 MG: 0.1 TABLET ORAL at 13:41

## 2024-04-07 RX ADMIN — HEPARIN SODIUM 5000 UNITS: 5000 INJECTION INTRAVENOUS; SUBCUTANEOUS at 06:42

## 2024-04-07 RX ADMIN — ATORVASTATIN CALCIUM 80 MG: 80 TABLET, FILM COATED ORAL at 20:16

## 2024-04-07 RX ADMIN — NICARDIPINE HYDROCHLORIDE 10 MG/HR: 0.2 INJECTION, SOLUTION INTRAVENOUS at 00:22

## 2024-04-07 RX ADMIN — HYDRALAZINE HYDROCHLORIDE 100 MG: 10 TABLET ORAL at 20:33

## 2024-04-07 RX ADMIN — SUCRALFATE 1 G: 1 TABLET ORAL at 08:25

## 2024-04-07 RX ADMIN — ISOSORBIDE MONONITRATE 120 MG: 60 TABLET, EXTENDED RELEASE ORAL at 09:22

## 2024-04-07 RX ADMIN — CLONIDINE HYDROCHLORIDE 0.2 MG: 0.1 TABLET ORAL at 09:22

## 2024-04-07 RX ADMIN — VALSARTAN 160 MG: 160 TABLET, FILM COATED ORAL at 09:22

## 2024-04-07 RX ADMIN — HEPARIN SODIUM 5000 UNITS: 5000 INJECTION INTRAVENOUS; SUBCUTANEOUS at 21:33

## 2024-04-07 RX ADMIN — NIFEDIPINE 90 MG: 90 TABLET, FILM COATED, EXTENDED RELEASE ORAL at 08:25

## 2024-04-07 RX ADMIN — HYDRALAZINE HYDROCHLORIDE 100 MG: 10 TABLET ORAL at 16:57

## 2024-04-07 RX ADMIN — HYDRALAZINE HYDROCHLORIDE 100 MG: 10 TABLET ORAL at 09:22

## 2024-04-07 RX ADMIN — NICARDIPINE HYDROCHLORIDE 15 MG/HR: 0.2 INJECTION, SOLUTION INTRAVENOUS at 12:39

## 2024-04-07 RX ADMIN — CLONIDINE HYDROCHLORIDE 0.2 MG: 0.1 TABLET ORAL at 21:33

## 2024-04-07 RX ADMIN — FLUTICASONE FUROATE AND VILANTEROL TRIFENATATE 1 PUFF: 100; 25 POWDER RESPIRATORY (INHALATION) at 07:42

## 2024-04-07 RX ADMIN — ASPIRIN 81 MG: 81 TABLET, COATED ORAL at 09:22

## 2024-04-07 ASSESSMENT — PAIN SCALES - GENERAL
PAINLEVEL_OUTOF10: 0 - NO PAIN

## 2024-04-07 ASSESSMENT — PAIN - FUNCTIONAL ASSESSMENT
PAIN_FUNCTIONAL_ASSESSMENT: 0-10

## 2024-04-07 NOTE — PROGRESS NOTES
"Stacey Heath is a 53 y.o. female on day 4 of admission presenting with Shortness of breath.    Subjective   Feels better this morning, reports  no headache, n/v, no chest pain.     Reported 3 episodes of diarrhea yesterday, all postprandial.   No Bms the day before       Objective   Vitals:    04/07/24 0700   BP:    Pulse: 72   Resp: 15   Temp:    SpO2: 100%       Physical Exam  Constitutional:       Appearance: Normal appearance.   HENT:      Head: Normocephalic and atraumatic.   Eyes:      Extraocular Movements: Extraocular movements intact.      Pupils: Pupils are equal, round, and reactive to light.   Cardiovascular:      Rate and Rhythm: Normal rate and regular rhythm.      Heart sounds: No murmur heard.     No gallop.   Pulmonary:      Effort: Pulmonary effort is normal. No respiratory distress.      Breath sounds: Rales present. No wheezing.   Abdominal:      General: Bowel sounds are normal.      Palpations: Abdomen is soft.      Tenderness: There is no abdominal tenderness. There is no guarding or rebound.   Skin:     General: Skin is warm and dry.   Neurological:      General: No focal deficit present.      Mental Status: She is alert and oriented to person, place, and time. Mental status is at baseline.   Psychiatric:         Mood and Affect: Mood normal.         Behavior: Behavior normal.   Last Recorded Vitals  Blood pressure (!) 132/49, pulse 72, temperature 36.2 °C (97.2 °F), temperature source Temporal, resp. rate 15, height 1.397 m (4' 7\"), weight 56.1 kg (123 lb 10.9 oz), SpO2 100 %.  Intake/Output last 3 Shifts:  I/O last 3 completed shifts:  In: 2764.6 (49.3 mL/kg) [P.O.:240; I.V.:2124.6 (37.9 mL/kg); Other:400]  Out: 2950 (52.6 mL/kg) [Urine:550 (0.3 mL/kg/hr); Other:2400]  Weight: 56.1 kg     Relevant Results           This patient currently has cardiac telemetry ordered; if you would like to modify or discontinue the telemetry order, click here to go to the orders activity to " modify/discontinue the order.      Results for orders placed or performed during the hospital encounter of 04/03/24 (from the past 24 hour(s))   POCT GLUCOSE   Result Value Ref Range    POCT Glucose 134 (H) 74 - 99 mg/dL   Renal Function Panel   Result Value Ref Range    Glucose 111 (H) 74 - 99 mg/dL    Sodium 139 136 - 145 mmol/L    Potassium 4.3 3.5 - 5.3 mmol/L    Chloride 104 98 - 107 mmol/L    Bicarbonate 26 21 - 32 mmol/L    Anion Gap 13 10 - 20 mmol/L    Urea Nitrogen 34 (H) 6 - 23 mg/dL    Creatinine 4.74 (H) 0.50 - 1.05 mg/dL    eGFR 10 (L) >60 mL/min/1.73m*2    Calcium 8.8 8.6 - 10.6 mg/dL    Phosphorus 3.3 2.5 - 4.9 mg/dL    Albumin 3.2 (L) 3.4 - 5.0 g/dL   Magnesium   Result Value Ref Range    Magnesium 2.00 1.60 - 2.40 mg/dL   Coagulation Screen   Result Value Ref Range    Protime 11.3 9.8 - 12.8 seconds    INR 1.0 0.9 - 1.1    aPTT 46 (H) 27 - 38 seconds   CBC and Auto Differential   Result Value Ref Range    WBC 5.5 4.4 - 11.3 x10*3/uL    nRBC 0.0 0.0 - 0.0 /100 WBCs    RBC 3.25 (L) 4.00 - 5.20 x10*6/uL    Hemoglobin 9.4 (L) 12.0 - 16.0 g/dL    Hematocrit 28.0 (L) 36.0 - 46.0 %    MCV 86 80 - 100 fL    MCH 28.9 26.0 - 34.0 pg    MCHC 33.6 32.0 - 36.0 g/dL    RDW 15.5 (H) 11.5 - 14.5 %    Platelets 169 150 - 450 x10*3/uL    Neutrophils % 66.9 40.0 - 80.0 %    Immature Granulocytes %, Automated 0.9 0.0 - 0.9 %    Lymphocytes % 13.9 13.0 - 44.0 %    Monocytes % 8.0 2.0 - 10.0 %    Eosinophils % 9.4 0.0 - 6.0 %    Basophils % 0.9 0.0 - 2.0 %    Neutrophils Absolute 3.70 1.20 - 7.70 x10*3/uL    Immature Granulocytes Absolute, Automated 0.05 0.00 - 0.70 x10*3/uL    Lymphocytes Absolute 0.77 (L) 1.20 - 4.80 x10*3/uL    Monocytes Absolute 0.44 0.10 - 1.00 x10*3/uL    Eosinophils Absolute 0.52 0.00 - 0.70 x10*3/uL    Basophils Absolute 0.05 0.00 - 0.10 x10*3/uL           Malnutrition          I agree with the dietitian's malnutrition diagnosis.      Assessment/Plan   Principal Problem:    Shortness of  breath  Active Problems:    ESRD (end stage renal disease) on dialysis (CMS/[facility])      Ms. Staecy Heath is a 53 year old female with ESRD 2/2 HTN and T2DM and poorly controlled hypertension presenting with SOB in setting of hypertensive emergency despite stated medication and dialysis adherence. Admitted to CICU for management of hypertensive emergency.     Updates 4/7:  - Increased nifedipine to 90, imdur to120, clonidine to 0.2 BID   - Wean of nicardipine gtt         NEUROLOGIC  #Headache   -Resolved  -tylenol 975mg tid prn      #Nausea  -home reglan resumed  -patient says zofran does not work for her  -consider compazine, Qtc 450 4/3/24     CARDIOVASCULAR  #Hypertensive emergency  #Pulmonary edema  #HFpEF  -Presented with /99, max at 265/88  - Running in the 140s-160s currently   -nitroglycerin gtt and esmolo gtt weaned off, currently on nicardipine 10  - Clonidine 0.2mg bid  - [id number] bid  - Coreg to 50 BID  - Hydral 100 TID  - Imdur to 120 daily   - Nifedipine 90 daily   - continue home atorvastatin 80mg      #Concern for aortic dissection  - Had chest pain initially when she came in  - CTA angio unremarkable, no chest pain currently          PULM   #AHRF 2/2 volume overload/moderate pulmonary edema  -HD as scheuled, nephro on board   -goal O2 sat >94%, titrate oxygen as needed     #COPD?   -following with pulm fellow clinic at , no PFTs yet to confirm COPD diagnosis  -planned for outpatient CT chest, PFTs, and sleep study  -continue home albuterol prn and breo ellipta     #History of Covid-19  -diagnosed on last admission 2/28/24  -no need for isolation     RENAL/  #ESRD on dialysis T/Th/Sat via RUE AVF, makes 1 cup urine daily  #history of kidney stones  #Hypervolemia  -Nephro on board  -Dialysis per schedule   -dry weight reported as 52kg  -renally dose meds, avoid nephrotoxic medications. Patient did receive contrast for CTA aorta     #Renal appointment 4/4/24 missed while inpatient  -had  been scheduled with Dr. Barraza, will need to  be rescheduled     GASTROINTESTINAL  -Chronic diarrhea, described as coming for a few weeks post prandial happens only after she eats something and she has 4-5 Bms that are watery, no BM as long as she is not eating and it resolves on its own   -Currently resolved, defer further workup -- had  3 episodes yesterday all post-prandial and nothing prior to that, no WBC, hold off further testing if still persistent will work it up      ENDOCRINE  #T2DM c/b retinopathy, nephropathy and neuropathy  -no medications  -Glucose 70s-140s     HEME/ONC  #Anemia of chronic disease I/s/o ESRD  -on Epo outpatient   -daily cbc     INFECTIOUS DISEASE  ZAIN     MSK/DERM  ZAIN        F: caution, hypervolemic  E: prn, ESRD  N: renal diet  A: L radial arterial line 4/3/24, RUE AVF  DVT ppx: SQH  GI ppx: home PPI    Code Status: Full code (confirmed on admission)  Surrogate Medical Decision-maker:      Daughter, Diya Jang ([phone] - [phone]  and Rohini Piña     [phone]                Savanah Randall MD

## 2024-04-07 NOTE — CARE PLAN
Problem: Pain  Goal: Takes deep breaths with improved pain control throughout the shift  Outcome: Progressing  Goal: Turns in bed with improved pain control throughout the shift  Outcome: Progressing  Goal: Walks with improved pain control throughout the shift  Outcome: Progressing  Goal: Performs ADL's with improved pain control throughout shift  Outcome: Progressing  Goal: Participates in PT with improved pain control throughout the shift  Outcome: Progressing     Problem: Pain - Adult  Goal: Verbalizes/displays adequate comfort level or baseline comfort level  Outcome: Progressing  Flowsheets (Taken 4/7/2024 0624)  Verbalizes/displays adequate comfort level or baseline comfort level:   Assess pain using appropriate pain scale   Implement non-pharmacological measures as appropriate and evaluate response     Problem: Safety - Adult  Goal: Free from fall injury  Outcome: Progressing  Flowsheets (Taken 4/7/2024 0624)  Free from fall injury: Instruct family/caregiver on patient safety   The patient's goals for the shift include Have BM on toilet    The clinical goals for the shift include Wean down cardene drip during shift.

## 2024-04-07 NOTE — NURSING NOTE
Midline    At bedside to assess patient for midline placement--unable to place midline due to IV infiltration in the upper left arm with leakage of fluid---will assess again tomorrow.  Judson ISAAC at bedside aware of plan.

## 2024-04-07 NOTE — CONSULTS
"Nutrition Initial Assessment:   Nutrition Assessment    The patient is a 53 y.o. female who is hospital day #4.  Pt admitted to CICU with SOB in setting of hypertensive emergency despite medication and dialysis adherence. Getting HD TTS.    PMHx: ESRD 2/2 HTN and T2DM and poorly controlled hypertension     Reason for Assessment: Dietitian discretion, Admission nursing screening (MST=4)    Nutrition History:  Food and Nutrient History: Pt reports a decreased PO intake for the past month. Things dont taste good to her and reports diarrhea after eating. States it is soon after eating. Also c/o nausea. Only doing bites of foods. Does not drink ONS though states her doctor was going to figure out if she could get some through insurance. Shellfish allergy. Per health touch review, pt ordering 3 meals a day from the kitchen which include a variety of foods. Of note, pt was admitted in Jan 2024 where she reproted decreased appetite for several months and endorsed diarrhea.  Vitamin/Herbal Supplement Use: Renal MVI?    Anthropometrics:  Start of admission anthropometrics:  Height: 139.7 cm (4' 7\")  Weight: 51.3 kg (113 lb)  BMI (Calculated): 26.26    IBW/kg (Dietitian Calculated): 39.8 kg  Percent of IBW: 140 %       Wt Readings from Last 10 Encounters:   04/07/24 56.1 kg (123 lb 10.9 oz)   03/29/24 51.6 kg (113 lb 12.8 oz)   03/29/24 51.6 kg (113 lb 12.8 oz)   03/27/24 52.6 kg (116 lb)   03/18/24 52.6 kg (116 lb)   02/28/24 55.8 kg (123 lb)   02/07/24 56 kg (123 lb 8 oz)   01/16/24 61.3 kg (135 lb 2.3 oz)   01/09/24 57.7 kg (127 lb 3.3 oz)   10/26/23 57.7 kg (127 lb 1.6 oz)       Weight Change %:  Weight History / % Weight Change: Pt reports a UBW of 135# and believes she is now 113#. States she lost 15# in the past month. EMR wt hx indicate a possible 11% wt loss in 3 months.  Significant Weight Loss: Yes  Interpretation of Weight Loss: >5% in 1 month    Nutrition Focused Physical Exam Findings:  defer: NFPE deferred - pt " playing on her ipad     Edema:  Edema: none  Physical Findings:  Skin: Negative    Objective Data:    Last BM Date: 04/06/24    Nutrition Significant Labs:    Results from last 7 days   Lab Units 04/07/24  0414 04/06/24  0441 04/05/24  0413 04/04/24  0506 04/03/24  1152   HEMOGLOBIN g/dL 9.4* 8.5* 7.9*   < > 9.4*   MCV fL 86 89 88   < > 89   GLUCOSE mg/dL 111* 83 142*   < > 76   POTASSIUM mmol/L 4.3 4.6 4.3   < > 3.7   SODIUM mmol/L 139 141 138   < > 142   PHOSPHORUS mg/dL 3.3 4.0 3.4   < > 3.6   MAGNESIUM mg/dL 2.00 2.17 2.05   < > 2.36   CREATININE mg/dL 4.74* 7.10* 5.03*   < > 6.81*   BUN mg/dL 34* 48* 26*   < > 30*   ALT U/L  --   --   --   --  8   AST U/L  --   --   --   --  14   ALK PHOS U/L  --   --   --   --  102    < > = values in this interval not displayed.       Nutrition Specific Medications:  atorvastatin, 80 mg, oral, Nightly  docusate sodium, 100 mg, oral, BID - Last given 04/05   multivitamin with minerals, 1 tablet, oral, Daily  pantoprazole, 40 mg, oral, Daily  esmolol,  mcg/kg/min, Last Rate: Stopped (04/05/24 0430)  niCARdipine, 2.5-15 mg/hr, Last Rate: 12.5 mg/hr (04/07/24 1300)  nitroglycerin, 0-200 mcg/min, Last Rate: Stopped (04/05/24 0510)        I/O:   I/O last 2 completed shifts:  In: 2208.1 (39.4 mL/kg) [P.O.:240; I.V.:1568.1 (28 mL/kg); Other:400]  Out: 2400 (42.8 mL/kg) [Other:2400]  Weight: 56.1 kg     Dietary Orders (From admission, onward)       Start     Ordered    04/07/24 1335  Adult diet 2-3 grams sodium  Diet effective now        Question:  Diet type  Answer:  2-3 grams sodium    04/07/24 0935                     Estimated Needs:   Total Energy Estimated Needs (kCal): 1550 kCal  Method for Estimating Needs: 56 kg * 28 kcal/kg    Total Protein Estimated Needs (g): 65 g  Method for Estimating Needs: 1.2 g/kg * 56 kg    Total Fluid Estimated Needs (mL):  (1000mL + UOP)          Nutrition Diagnosis   Malnutrition Diagnosis  Patient has Malnutrition Diagnosis:  (Suspect  malnutrition present but will need NFPE.)    Nutrition Diagnosis  Patient has Nutrition Diagnosis: Yes  Diagnosis Status (1): New  Nutrition Diagnosis 1: Increased nutrient needs  Related to (1): increased lossess  As Evidenced by (1): HD       Nutrition Interventions/Recommendations   Individualized Nutrition Prescription Provided for : 1550 kcal and 65g protein via oral diet        Nutrition Interventions:   Medical Food Supplement: Commercial beverage  Goal: Drink 1 Ensure Plus per day  Vitamin Supplement Therapy: Other (Comment) (MVI)  Switch MVI to renal MVI  Additional Interventions:   Daily weights  Document PO intake in flowsheets         Nutrition Monitoring and Evaluation   Monitoring and Evaluation Plan: Energy intake  Energy Intake: Estimated energy intake  Criteria: >50% of nutrient needs    Monitoring and Evaluation Plan: Weight  Weight: Weight change  Criteria: no wt change    Monitoring and Evaluation Plan: Electrolyte/renal panel  Electrolyte and Renal Panel: Phosphorus, Magnesium, Potassium, Sodium  Criteria: lytes wnl            Time Spent/Follow-up Reminder:   Time Spent (min): 45 minutes  Last Date of Nutrition Visit: 04/07/24  Nutrition Follow-Up Needed?: Dietitian to reassess per policy  Follow up Comment: PO diet + ONS

## 2024-04-08 ENCOUNTER — APPOINTMENT (OUTPATIENT)
Dept: RESPIRATORY THERAPY | Facility: HOSPITAL | Age: 54
End: 2024-04-08
Payer: COMMERCIAL

## 2024-04-08 LAB
ALBUMIN SERPL BCP-MCNC: 3 G/DL (ref 3.4–5)
ANION GAP SERPL CALC-SCNC: 15 MMOL/L (ref 10–20)
BASOPHILS # BLD AUTO: 0.06 X10*3/UL (ref 0–0.1)
BASOPHILS NFR BLD AUTO: 1.3 %
BUN SERPL-MCNC: 49 MG/DL (ref 6–23)
CALCIUM SERPL-MCNC: 8.6 MG/DL (ref 8.6–10.6)
CHLORIDE SERPL-SCNC: 107 MMOL/L (ref 98–107)
CO2 SERPL-SCNC: 22 MMOL/L (ref 21–32)
CREAT SERPL-MCNC: 6.91 MG/DL (ref 0.5–1.05)
EGFRCR SERPLBLD CKD-EPI 2021: 7 ML/MIN/1.73M*2
EOSINOPHIL # BLD AUTO: 0.48 X10*3/UL (ref 0–0.7)
EOSINOPHIL NFR BLD AUTO: 10 %
ERYTHROCYTE [DISTWIDTH] IN BLOOD BY AUTOMATED COUNT: 15.8 % (ref 11.5–14.5)
GLUCOSE SERPL-MCNC: 99 MG/DL (ref 74–99)
HCT VFR BLD AUTO: 26.5 % (ref 36–46)
HGB BLD-MCNC: 8.5 G/DL (ref 12–16)
HLA RESULTS: NORMAL
HLA-A+B+C AB NFR SER: NORMAL %
HLA-DP+DQ+DR AB NFR SER: NORMAL %
IMM GRANULOCYTES # BLD AUTO: 0.03 X10*3/UL (ref 0–0.7)
IMM GRANULOCYTES NFR BLD AUTO: 0.6 % (ref 0–0.9)
LYMPHOCYTES # BLD AUTO: 0.82 X10*3/UL (ref 1.2–4.8)
LYMPHOCYTES NFR BLD AUTO: 17.1 %
MAGNESIUM SERPL-MCNC: 2.09 MG/DL (ref 1.6–2.4)
MCH RBC QN AUTO: 28.4 PG (ref 26–34)
MCHC RBC AUTO-ENTMCNC: 32.1 G/DL (ref 32–36)
MCV RBC AUTO: 89 FL (ref 80–100)
MONOCYTES # BLD AUTO: 0.52 X10*3/UL (ref 0.1–1)
MONOCYTES NFR BLD AUTO: 10.9 %
NEUTROPHILS # BLD AUTO: 2.88 X10*3/UL (ref 1.2–7.7)
NEUTROPHILS NFR BLD AUTO: 60.1 %
NRBC BLD-RTO: 0 /100 WBCS (ref 0–0)
PHOSPHATE SERPL-MCNC: 3.5 MG/DL (ref 2.5–4.9)
PLATELET # BLD AUTO: 157 X10*3/UL (ref 150–450)
POTASSIUM SERPL-SCNC: 4.4 MMOL/L (ref 3.5–5.3)
RBC # BLD AUTO: 2.99 X10*6/UL (ref 4–5.2)
SODIUM SERPL-SCNC: 140 MMOL/L (ref 136–145)
WBC # BLD AUTO: 4.8 X10*3/UL (ref 4.4–11.3)

## 2024-04-08 PROCEDURE — 1200000002 HC GENERAL ROOM WITH TELEMETRY DAILY

## 2024-04-08 PROCEDURE — 2500000002 HC RX 250 W HCPCS SELF ADMINISTERED DRUGS (ALT 637 FOR MEDICARE OP, ALT 636 FOR OP/ED)

## 2024-04-08 PROCEDURE — 2500000001 HC RX 250 WO HCPCS SELF ADMINISTERED DRUGS (ALT 637 FOR MEDICARE OP)

## 2024-04-08 PROCEDURE — 85025 COMPLETE CBC W/AUTO DIFF WBC: CPT

## 2024-04-08 PROCEDURE — 2500000002 HC RX 250 W HCPCS SELF ADMINISTERED DRUGS (ALT 637 FOR MEDICARE OP, ALT 636 FOR OP/ED): Mod: MUE

## 2024-04-08 PROCEDURE — 2500000001 HC RX 250 WO HCPCS SELF ADMINISTERED DRUGS (ALT 637 FOR MEDICARE OP): Performed by: STUDENT IN AN ORGANIZED HEALTH CARE EDUCATION/TRAINING PROGRAM

## 2024-04-08 PROCEDURE — 05H833Z INSERTION OF INFUSION DEVICE INTO LEFT AXILLARY VEIN, PERCUTANEOUS APPROACH: ICD-10-PCS | Performed by: HOSPITALIST

## 2024-04-08 PROCEDURE — 83735 ASSAY OF MAGNESIUM: CPT | Performed by: STUDENT IN AN ORGANIZED HEALTH CARE EDUCATION/TRAINING PROGRAM

## 2024-04-08 PROCEDURE — 2500000004 HC RX 250 GENERAL PHARMACY W/ HCPCS (ALT 636 FOR OP/ED): Performed by: STUDENT IN AN ORGANIZED HEALTH CARE EDUCATION/TRAINING PROGRAM

## 2024-04-08 PROCEDURE — 80069 RENAL FUNCTION PANEL: CPT | Performed by: STUDENT IN AN ORGANIZED HEALTH CARE EDUCATION/TRAINING PROGRAM

## 2024-04-08 PROCEDURE — 2500000002 HC RX 250 W HCPCS SELF ADMINISTERED DRUGS (ALT 637 FOR MEDICARE OP, ALT 636 FOR OP/ED): Mod: MUE | Performed by: STUDENT IN AN ORGANIZED HEALTH CARE EDUCATION/TRAINING PROGRAM

## 2024-04-08 PROCEDURE — 37799 UNLISTED PX VASCULAR SURGERY: CPT | Performed by: STUDENT IN AN ORGANIZED HEALTH CARE EDUCATION/TRAINING PROGRAM

## 2024-04-08 PROCEDURE — 2500000004 HC RX 250 GENERAL PHARMACY W/ HCPCS (ALT 636 FOR OP/ED)

## 2024-04-08 PROCEDURE — 2500000002 HC RX 250 W HCPCS SELF ADMINISTERED DRUGS (ALT 637 FOR MEDICARE OP, ALT 636 FOR OP/ED): Performed by: STUDENT IN AN ORGANIZED HEALTH CARE EDUCATION/TRAINING PROGRAM

## 2024-04-08 PROCEDURE — 99291 CRITICAL CARE FIRST HOUR: CPT

## 2024-04-08 RX ORDER — HYDRALAZINE HYDROCHLORIDE 10 MG/1
50 TABLET, FILM COATED ORAL ONCE
Status: DISCONTINUED | OUTPATIENT
Start: 2024-04-08 | End: 2024-04-08

## 2024-04-08 RX ORDER — MINOXIDIL 2.5 MG/1
5 TABLET ORAL DAILY
Status: DISCONTINUED | OUTPATIENT
Start: 2024-04-08 | End: 2024-04-09

## 2024-04-08 RX ORDER — DOXAZOSIN 2 MG/1
2 TABLET ORAL DAILY
Status: DISCONTINUED | OUTPATIENT
Start: 2024-04-08 | End: 2024-04-08

## 2024-04-08 RX ORDER — DIPHENHYDRAMINE HCL 25 MG
25 CAPSULE ORAL EVERY 6 HOURS PRN
Status: DISCONTINUED | OUTPATIENT
Start: 2024-04-08 | End: 2024-04-14 | Stop reason: HOSPADM

## 2024-04-08 RX ADMIN — PANTOPRAZOLE SODIUM 40 MG: 40 TABLET, DELAYED RELEASE ORAL at 08:37

## 2024-04-08 RX ADMIN — MINOXIDIL 5 MG: 2.5 TABLET ORAL at 10:14

## 2024-04-08 RX ADMIN — HYDRALAZINE HYDROCHLORIDE 100 MG: 10 TABLET ORAL at 14:08

## 2024-04-08 RX ADMIN — ACETAMINOPHEN 975 MG: 325 TABLET ORAL at 14:21

## 2024-04-08 RX ADMIN — Medication 1 TABLET: at 08:37

## 2024-04-08 RX ADMIN — CARVEDILOL 50 MG: 25 TABLET, FILM COATED ORAL at 22:12

## 2024-04-08 RX ADMIN — NIFEDIPINE 90 MG: 90 TABLET, FILM COATED, EXTENDED RELEASE ORAL at 06:16

## 2024-04-08 RX ADMIN — HEPARIN SODIUM 5000 UNITS: 5000 INJECTION INTRAVENOUS; SUBCUTANEOUS at 22:01

## 2024-04-08 RX ADMIN — ISOSORBIDE MONONITRATE 120 MG: 60 TABLET, EXTENDED RELEASE ORAL at 08:37

## 2024-04-08 RX ADMIN — CLONIDINE HYDROCHLORIDE 0.2 MG: 0.1 TABLET ORAL at 06:16

## 2024-04-08 RX ADMIN — HYDRALAZINE HYDROCHLORIDE 100 MG: 10 TABLET ORAL at 08:37

## 2024-04-08 RX ADMIN — VALSARTAN 160 MG: 160 TABLET, FILM COATED ORAL at 10:11

## 2024-04-08 RX ADMIN — FLUTICASONE FUROATE AND VILANTEROL TRIFENATATE 1 PUFF: 100; 25 POWDER RESPIRATORY (INHALATION) at 10:10

## 2024-04-08 RX ADMIN — CARVEDILOL 50 MG: 25 TABLET, FILM COATED ORAL at 08:37

## 2024-04-08 RX ADMIN — HEPARIN SODIUM 5000 UNITS: 5000 INJECTION INTRAVENOUS; SUBCUTANEOUS at 13:24

## 2024-04-08 RX ADMIN — CLONIDINE HYDROCHLORIDE 0.2 MG: 0.1 TABLET ORAL at 13:23

## 2024-04-08 RX ADMIN — HEPARIN SODIUM 5000 UNITS: 5000 INJECTION INTRAVENOUS; SUBCUTANEOUS at 06:16

## 2024-04-08 RX ADMIN — DOXAZOSIN 5 MG: 4 TABLET ORAL at 10:13

## 2024-04-08 RX ADMIN — DIPHENHYDRAMINE HYDROCHLORIDE 25 MG: 50 INJECTION, SOLUTION INTRAMUSCULAR; INTRAVENOUS at 00:52

## 2024-04-08 RX ADMIN — FLUTICASONE PROPIONATE 2 SPRAY: 50 SPRAY, METERED NASAL at 10:12

## 2024-04-08 RX ADMIN — ASPIRIN 81 MG: 81 TABLET, COATED ORAL at 08:37

## 2024-04-08 RX ADMIN — ATORVASTATIN CALCIUM 80 MG: 80 TABLET, FILM COATED ORAL at 22:01

## 2024-04-08 RX ADMIN — CLONIDINE HYDROCHLORIDE 0.2 MG: 0.1 TABLET ORAL at 22:21

## 2024-04-08 RX ADMIN — DIPHENHYDRAMINE HYDROCHLORIDE 25 MG: 50 INJECTION, SOLUTION INTRAMUSCULAR; INTRAVENOUS at 06:16

## 2024-04-08 RX ADMIN — SUCRALFATE 1 G: 1 TABLET ORAL at 06:20

## 2024-04-08 ASSESSMENT — PAIN SCALES - GENERAL
PAINLEVEL_OUTOF10: 0 - NO PAIN
PAINLEVEL_OUTOF10: 3

## 2024-04-08 ASSESSMENT — PAIN - FUNCTIONAL ASSESSMENT
PAIN_FUNCTIONAL_ASSESSMENT: 0-10

## 2024-04-08 ASSESSMENT — PAIN DESCRIPTION - LOCATION: LOCATION: WRIST

## 2024-04-08 ASSESSMENT — PAIN SCALES - PAIN ASSESSMENT IN ADVANCED DEMENTIA (PAINAD)
CONSOLABILITY: NO NEED TO CONSOLE
BREATHING: NORMAL
FACIALEXPRESSION: SMILING OR INEXPRESSIVE
TOTALSCORE: 0
BODYLANGUAGE: RELAXED

## 2024-04-08 ASSESSMENT — PAIN SCALES - WONG BAKER: WONGBAKER_NUMERICALRESPONSE: NO HURT

## 2024-04-08 ASSESSMENT — PAIN DESCRIPTION - ORIENTATION: ORIENTATION: LEFT

## 2024-04-08 NOTE — PROGRESS NOTES
"Stacey Heath  Age: 53 y.o.  MRN: [id number]  Date: 4/5/2024  Location of service: in community    Program Details  Medicaid Community Clinical Case Management  Status: Enrolled  Effective Dates: 10/17/2023 - present  Responsible Staff: PORFIRIO Valderrama      Goals Reviewed:  Problem: Risk of Uncoordinated Care       Goal: Care will be Coordinated and Supported by a Multidisciplinary Team of Providers       Priority: High          Summary:  This writer visited patient in CICU.  Patient states she has fluid throughout her whole body, she states it's not her heart.  Patient's BP is still elevated, she states she's on three BP medications.  Patient states she will need a home health nurse daily.   Patient states she will likely be leaving ICU tomorrow. Depending on how she does on the \"drip\" she's currently on.  The patient's providers enter the room while this writer is present. They state patient's BP is looking better today and they will try to get her off of some of the BP medications. They state they want to look into other causes for her high BP and recommend she schedule the endocrinology appointment.  Patient verbalizes frustration related to her situation and the fact that this keeps happening even though she is doing everything correctly at home.    Appointment start time: 1155  Appointment completion time: 1303  Total time spent with patient (in minutes): 68      Roberta Gallego RN    "

## 2024-04-08 NOTE — SIGNIFICANT EVENT
Okay to place midline for IV access. If possible, better to place tunneled PICC to spare UE for future fistula access.    Joleen Medina MD  Nephrology Fellow PGY 5  Contact via secure chat  Nephrology Pager # [phone] ([phone])

## 2024-04-08 NOTE — PROCEDURES
3D mapping ongoing.  Pre-Procedure Checklist:  Emergent Line Insertion: No  Type of Line to be Placed: Midline  Consent Obtained: N/A  Emergency Medication Necessary: No  Patient Identified with 2 Independent Identifiers: Yes  Review of Allergies, Anticoagulation, Relevant Labs, ECG/Telemetry: Yes  Risks/Benefits/Alternatives Discussed with Patient/POA/Legal Representative: Yes  Stop Sign on Door: Yes  Time Out Performed: Yes  Catheter Exchange: No    Positioning Checklist:  All People, Including Patient, in the Room with Cap and Mask: Yes  Fluoroscopy Used to Identify Vessel and Guide Insertion: No   Sterile Cover Used: Yes  Full Barrier Precautions Followed (Mask, Cap, Gown, Gloves): Yes  Hands Washed: Yes  Monitors Attached with Sound Alarms On: No  Full Body Sterile Drape (Head-to-Toe) Used to Cover Patient: Yes  Trendelenburg Position (For IJ and Subclavian): No  CHG Skin Prep Used and Allowed to Air Dry to Skin Procedure: Yes    Procedure Checklist:  Blood Aspirated From All Lumens, All Ports Subsequently Flushed: Yes  Catheter Caps Placed on All Lumens; Lumens Clamped: Yes  Maintain Guidewire Control Throughout, Ensuring Guidewire Removal: Yes  Maintain Sterile Field Throughout Insertion: Yes  Catheter Secured: Yes  Confirmatory Test of Venous Placement: Non-Pulsatile Blood    Post Procedure Checklist:  Date and Time Written on Dressing: Yes  Sharp and Wire Count and Safe Disposal of all Sharps/Wires: Yes  Sterile Dressing Applied Per Protocol: Yes  X-ray Ordered or ECG Image: N/A    Midline Insertion Details:  Size (Fr): 3  Lumen Type: Single SL   Catheter to Vein Ratio Less Than 50%: Yes  Total Length (cm): 9   External Length (cm): 0  Orientation: Left basilic vein     Site Prep: Chlorohexidine; Usual sterile procedure followed  Local Anesthetic: Injectable/Subcutaneous  Indication: access  Insertion Team Members in the Room: Fatoumata Chowdhury LPN  Initial Extremity Circumference (cm): 28  Insertion Attempts: 1  Patient  Tolerance: Tolerated Well, Age Appropriate  Comfort Measures: Subcutaneous anesthetic; Verbal  Procedure Location: Bedside  Safety Measures: Patient specific safety measures addressed with RN  Estimated Blood Loss (mL): 1  Vessel Fully Compressible Proximally and Distally to Insertion Site: Yes  Brisk Blood Return Obtained and Line Draws Easily: Yes  Tip Location: left axilla   Line Confirmation: non-pulsatile blood return; ultrasound  Lot #:[id number]  : Bard  PICC Line Exp Date:03/31/2025  Securement: Stat Lock  Post Procedure Checklist: Handoff with RN; Obtain all new IV tubing prior to use; Bed at lowest level and wheels locked; Line discharge information at bedside.  Additional Details: Line was inserted using Modified Seldinger's Technique.   Placed by:Abigail Flores RN

## 2024-04-08 NOTE — CARE PLAN
Problem: Pain - Adult  Goal: Verbalizes/displays adequate comfort level or baseline comfort level  Outcome: Progressing     Problem: Safety - Adult  Goal: Free from fall injury  Outcome: Progressing   The patient's goals for the shift include pt will remain free of injury    The clinical goals for the shift include pt will remain HDS.

## 2024-04-08 NOTE — PROGRESS NOTES
Stacey Heath is a 53 y.o. female on day 5 of admission presenting with Shortness of breath.    Subjective   Stacey Heath is a 53 y.o. female with PMH ESRD 2/2 HTN and diabetes on dialysis T/Th/Sat (last complete session 4/2/24) and poorly controlled hypertension, DMT2, and COPD (baseline 1-2L NC) who presented to the ED 4/3/24 with SOB on waking and a headache. BP found to be in 230's/90's, CXR with pulmonary edena, labs notable for Cr 6.12, BNP 2016, troponin 193--> 116. Does not wear oxygen at home but notes that she often requires oxygen when she is fluid overloaded (1-2L). Currently being evaluated for kidney transplant and was noted to be significantly SOB at recent appointment 3/29/24, planned for outpatient CT chest, PFTs and sleep study. CT angio aorta and b/l ileofemoral to rule out aortic dissection. Admitted to CICU for hypertensive Emergency.      On arrival to CICU, patient awake and in no distress. Complains of feeling fluid overloaded by facial puffiness and 7/10 frontal headache that does not feel like her typical migraine headache. During her stay she was initially maintained on nitroglycerin gtt and then had to add esmolol gtt. She eventually needed 3 drips to with adding cardene gtt. CTA angio was done initially for concern of aortic dissection but was unremarkable for an aortic pathology.      During her stay:  Hydralazine from 50 TID to 100 TID  Carvedilol from 25 BID to 50 BID  Clonidine from 0.1 BID to 0.2 TID  Amlodipine Dced and started on nifedipine 90  Imdur from 30 daily to 120 daily   Valsartan kept at 160 BID   Started on minoxidil 5 daily       Objective     Physical Exam  Vitals reviewed.   Constitutional:       Appearance: Normal appearance.   HENT:      Head: Normocephalic and atraumatic.      Mouth/Throat:      Mouth: Mucous membranes are moist.   Eyes:      Extraocular Movements: Extraocular movements intact.      Pupils: Pupils are equal, round, and reactive to light.  "  Cardiovascular:      Rate and Rhythm: Normal rate and regular rhythm.   Pulmonary:      Effort: Pulmonary effort is normal.      Breath sounds: Normal breath sounds.   Abdominal:      General: Abdomen is flat.      Palpations: Abdomen is soft.   Musculoskeletal:         General: Normal range of motion.      Cervical back: Normal range of motion and neck supple.   Neurological:      General: No focal deficit present.      Mental Status: She is alert and oriented to person, place, and time.   Psychiatric:         Mood and Affect: Mood normal.         Last Recorded Vitals  Blood pressure (!) 132/49, pulse 74, temperature 36.4 °C (97.5 °F), temperature source Temporal, resp. rate 17, height 1.397 m (4' 7\"), weight 55.7 kg (122 lb 12.7 oz), SpO2 100 %.  Intake/Output last 3 Shifts:  I/O last 3 completed shifts:  In: 1676.7 (29.9 mL/kg) [P.O.:570; I.V.:1106.7 (19.7 mL/kg)]  Out: - (0 mL/kg)   Weight: 56.1 kg     Relevant Results    Scheduled medications  aspirin, 81 mg, oral, Daily  atorvastatin, 80 mg, oral, Nightly  carvedilol, 50 mg, oral, BID  cloNIDine, 0.2 mg, oral, q8h BELEN  docusate sodium, 100 mg, oral, BID  fluticasone, 2 spray, Each Nostril, Daily  fluticasone furoate-vilanteroL, 1 puff, inhalation, Daily  heparin (porcine), 5,000 Units, subcutaneous, q8h BELEN  hydrALAZINE, 100 mg, oral, TID  isosorbide mononitrate ER, 120 mg, oral, Daily  lidocaine, 5 mL, infiltration, Once  minoxidil, 5 mg, oral, Daily  multivitamin with minerals, 1 tablet, oral, Daily  NIFEdipine ER, 90 mg, oral, Daily before breakfast  pantoprazole, 40 mg, oral, Daily  perflutren lipid microspheres, 0.5-10 mL of dilution, intravenous, Once in imaging  perflutren protein A microsphere, 0.5 mL, intravenous, Once in imaging  sucralfate, 1 g, oral, BID AC  sulfur hexafluoride microsphr, 2 mL, intravenous, Once in imaging  valsartan, 160 mg, oral, BID      Continuous medications     PRN medications  PRN medications: acetaminophen, albuterol, " diphenhydrAMINE, hydrOXYzine HCL, metoclopramide, oxygen, prochlorperazine **OR** prochlorperazine **OR** prochlorperazine, sodium chloride, SUMAtriptan        Malnutrition          I agree with the dietitian's malnutrition diagnosis.      Assessment/Plan   Principal Problem:    Shortness of breath  Active Problems:    ESRD (end stage renal disease) on dialysis (CMS/[facility])    Ms. Stacey Heath is a 53 year old female with ESRD 2/2 HTN and T2DM and poorly controlled hypertension presenting with SOB in setting of hypertensive emergency despite stated medication and dialysis adherence. Admitted to CICU for management of hypertensive emergency. Restarted on PO meds as below. Transferred to the floor 4/8.        #Hypertensive emergency  #Pulmonary edema  #HFpEF  -Presented with /99, max at 265/88  - Running in the 140s-160s currently   -nitroglycerin gtt and esmolo gtt weaned off, currently on nicardipine 10  - Clonidine 0.2mg Tid  - [id number] bid  - Coreg to 50 BID  - Hydral 100 TID  - Imdur 120 daily   - Nifedipine 90 daily   - ADDED minoxidil 5   - continue home atorvastatin 80mg   -Has follow up with endocrine for evaluation for secondary causes of hypertension        #AHRF 2/2 volume overload/moderate pulmonary edema  -HD as scheuled, nephro on board   -goal O2 sat >94%, titrate oxygen as needed     #COPD?   -following with pulm fellow clinic at , no PFTs yet to confirm COPD diagnosis  -planned for outpatient CT chest, PFTs, and sleep study  -continue home albuterol prn and breo ellipta     #History of Covid-19  -diagnosed on last admission 2/28/24  -no need for isolation    #ESRD on dialysis T/Th/Sat via RUE AVF, makes 1 cup urine daily  #history of kidney stones  #Hypervolemia  -Nephro on board  -Dialysis per schedule   -dry weight reported as 52kg  -renally dose meds, avoid nephrotoxic medications. Patient did receive contrast for CTA aorta   -Renal appointment 4/4/24 missed while inpatient had been  scheduled with Dr. Barraza, will need to  be rescheduled     -Chronic diarrhea, described as coming for a few weeks post prandial happens only after she eats something and she has 4-5 Bms that are watery, no BM as long as she is not eating and it resolves on its own   -Currently resolved     #T2DM c/b retinopathy, nephropathy and neuropathy  -no medications  -Glucose 70s-140s     #Anemia of chronic disease I/s/o ESRD  -on Epo outpatient   -daily cbc    #Nausea  -C/w home metoclopramide      F: PRN  E: prn, ESRD  N: renal diet  A: L radial arterial line 4/3/24, RUE AVF  DVT ppx: SQH  GI ppx: home PPI    Code Status: Full code (confirmed on admission)  Surrogate Medical Decision-maker:      Diya Cramer [phone]  and Rohini Piña     [phone]               Logan Solitario MD

## 2024-04-08 NOTE — CARE PLAN
No need for dialysis today. Next HD tomorrow as per TTS schedule.    D/w Dr. Swapna Medina MD  Nephrology Fellow PGY 5  Contact via secure chat  Nephrology Pager # [phone] ([phone])

## 2024-04-08 NOTE — PROGRESS NOTES
Physical Therapy                 Therapy Communication Note    Patient Name: Stacey Heath  MRN: [id number]  Today's Date: 4/8/2024     Discipline: Physical Therapy    Missed Visit Reason: Missed Visit Reason: Patient refused (reports feeling tired at this time.)    Missed Time: Attempt    Comment:

## 2024-04-09 ENCOUNTER — APPOINTMENT (OUTPATIENT)
Dept: DIALYSIS | Facility: HOSPITAL | Age: 54
End: 2024-04-09
Payer: COMMERCIAL

## 2024-04-09 LAB
ALBUMIN SERPL BCP-MCNC: 3.2 G/DL (ref 3.4–5)
ANION GAP SERPL CALC-SCNC: 17 MMOL/L (ref 10–20)
ATRIAL RATE: 89 BPM
BASOPHILS # BLD AUTO: 0.03 X10*3/UL (ref 0–0.1)
BASOPHILS NFR BLD AUTO: 0.5 %
BUN SERPL-MCNC: 64 MG/DL (ref 6–23)
CALCIUM SERPL-MCNC: 9 MG/DL (ref 8.6–10.6)
CHLORIDE SERPL-SCNC: 104 MMOL/L (ref 98–107)
CO2 SERPL-SCNC: 22 MMOL/L (ref 21–32)
CREAT SERPL-MCNC: 9.79 MG/DL (ref 0.5–1.05)
EGFRCR SERPLBLD CKD-EPI 2021: 4 ML/MIN/1.73M*2
EOSINOPHIL # BLD AUTO: 0.42 X10*3/UL (ref 0–0.7)
EOSINOPHIL NFR BLD AUTO: 6.4 %
ERYTHROCYTE [DISTWIDTH] IN BLOOD BY AUTOMATED COUNT: 16 % (ref 11.5–14.5)
GLUCOSE BLD MANUAL STRIP-MCNC: 168 MG/DL (ref 74–99)
GLUCOSE SERPL-MCNC: 143 MG/DL (ref 74–99)
HCT VFR BLD AUTO: 28.7 % (ref 36–46)
HGB BLD-MCNC: 9.1 G/DL (ref 12–16)
IMM GRANULOCYTES # BLD AUTO: 0.04 X10*3/UL (ref 0–0.7)
IMM GRANULOCYTES NFR BLD AUTO: 0.6 % (ref 0–0.9)
LYMPHOCYTES # BLD AUTO: 0.53 X10*3/UL (ref 1.2–4.8)
LYMPHOCYTES NFR BLD AUTO: 8.1 %
MAGNESIUM SERPL-MCNC: 2.31 MG/DL (ref 1.6–2.4)
MCH RBC QN AUTO: 28.7 PG (ref 26–34)
MCHC RBC AUTO-ENTMCNC: 31.7 G/DL (ref 32–36)
MCV RBC AUTO: 91 FL (ref 80–100)
MONOCYTES # BLD AUTO: 0.45 X10*3/UL (ref 0.1–1)
MONOCYTES NFR BLD AUTO: 6.9 %
NEUTROPHILS # BLD AUTO: 5.09 X10*3/UL (ref 1.2–7.7)
NEUTROPHILS NFR BLD AUTO: 77.5 %
NRBC BLD-RTO: 0 /100 WBCS (ref 0–0)
P AXIS: 54 DEGREES
P OFFSET: 194 MS
P ONSET: 145 MS
PHOSPHATE SERPL-MCNC: 4.9 MG/DL (ref 2.5–4.9)
PLATELET # BLD AUTO: 171 X10*3/UL (ref 150–450)
POTASSIUM SERPL-SCNC: 5.5 MMOL/L (ref 3.5–5.3)
PR INTERVAL: 148 MS
Q ONSET: 219 MS
QRS COUNT: 14 BEATS
QRS DURATION: 86 MS
QT INTERVAL: 370 MS
QTC CALCULATION(BAZETT): 450 MS
QTC FREDERICIA: 421 MS
R AXIS: 43 DEGREES
RBC # BLD AUTO: 3.17 X10*6/UL (ref 4–5.2)
SODIUM SERPL-SCNC: 137 MMOL/L (ref 136–145)
T AXIS: 184 DEGREES
T OFFSET: 404 MS
VENTRICULAR RATE: 89 BPM
WBC # BLD AUTO: 6.6 X10*3/UL (ref 4.4–11.3)

## 2024-04-09 PROCEDURE — 80069 RENAL FUNCTION PANEL: CPT

## 2024-04-09 PROCEDURE — 82947 ASSAY GLUCOSE BLOOD QUANT: CPT

## 2024-04-09 PROCEDURE — 2500000001 HC RX 250 WO HCPCS SELF ADMINISTERED DRUGS (ALT 637 FOR MEDICARE OP)

## 2024-04-09 PROCEDURE — 97116 GAIT TRAINING THERAPY: CPT | Mod: GP

## 2024-04-09 PROCEDURE — 1100000001 HC PRIVATE ROOM DAILY

## 2024-04-09 PROCEDURE — 2500000004 HC RX 250 GENERAL PHARMACY W/ HCPCS (ALT 636 FOR OP/ED)

## 2024-04-09 PROCEDURE — 83735 ASSAY OF MAGNESIUM: CPT

## 2024-04-09 PROCEDURE — 8010000001 HC DIALYSIS - HEMODIALYSIS PER DAY

## 2024-04-09 PROCEDURE — 90935 HEMODIALYSIS ONE EVALUATION: CPT | Performed by: INTERNAL MEDICINE

## 2024-04-09 PROCEDURE — 85025 COMPLETE CBC W/AUTO DIFF WBC: CPT

## 2024-04-09 PROCEDURE — 2500000002 HC RX 250 W HCPCS SELF ADMINISTERED DRUGS (ALT 637 FOR MEDICARE OP, ALT 636 FOR OP/ED)

## 2024-04-09 PROCEDURE — 2500000002 HC RX 250 W HCPCS SELF ADMINISTERED DRUGS (ALT 637 FOR MEDICARE OP, ALT 636 FOR OP/ED): Mod: MUE

## 2024-04-09 PROCEDURE — 2500000001 HC RX 250 WO HCPCS SELF ADMINISTERED DRUGS (ALT 637 FOR MEDICARE OP): Performed by: STUDENT IN AN ORGANIZED HEALTH CARE EDUCATION/TRAINING PROGRAM

## 2024-04-09 PROCEDURE — 99232 SBSQ HOSP IP/OBS MODERATE 35: CPT

## 2024-04-09 RX ORDER — HYDRALAZINE HYDROCHLORIDE 10 MG/1
50 TABLET, FILM COATED ORAL ONCE
Status: COMPLETED | OUTPATIENT
Start: 2024-04-09 | End: 2024-04-09

## 2024-04-09 RX ADMIN — CARVEDILOL 50 MG: 25 TABLET, FILM COATED ORAL at 23:37

## 2024-04-09 RX ADMIN — HEPARIN SODIUM 5000 UNITS: 5000 INJECTION INTRAVENOUS; SUBCUTANEOUS at 06:18

## 2024-04-09 RX ADMIN — ACETAMINOPHEN 975 MG: 325 TABLET ORAL at 18:30

## 2024-04-09 RX ADMIN — PANTOPRAZOLE SODIUM 40 MG: 40 TABLET, DELAYED RELEASE ORAL at 08:10

## 2024-04-09 RX ADMIN — DIPHENHYDRAMINE HYDROCHLORIDE 25 MG: 25 CAPSULE ORAL at 21:18

## 2024-04-09 RX ADMIN — HEPARIN SODIUM 5000 UNITS: 5000 INJECTION INTRAVENOUS; SUBCUTANEOUS at 23:38

## 2024-04-09 RX ADMIN — VALSARTAN 160 MG: 160 TABLET, FILM COATED ORAL at 23:37

## 2024-04-09 RX ADMIN — SUCRALFATE 1 G: 1 TABLET ORAL at 15:48

## 2024-04-09 RX ADMIN — CLONIDINE HYDROCHLORIDE 0.2 MG: 0.1 TABLET ORAL at 23:37

## 2024-04-09 RX ADMIN — ASPIRIN 81 MG: 81 TABLET, COATED ORAL at 08:10

## 2024-04-09 RX ADMIN — HYDRALAZINE HYDROCHLORIDE 100 MG: 10 TABLET ORAL at 23:37

## 2024-04-09 RX ADMIN — CARVEDILOL 50 MG: 25 TABLET, FILM COATED ORAL at 15:49

## 2024-04-09 RX ADMIN — ISOSORBIDE MONONITRATE 120 MG: 60 TABLET, EXTENDED RELEASE ORAL at 15:49

## 2024-04-09 RX ADMIN — NIFEDIPINE 90 MG: 90 TABLET, FILM COATED, EXTENDED RELEASE ORAL at 15:49

## 2024-04-09 RX ADMIN — Medication 1 TABLET: at 08:10

## 2024-04-09 RX ADMIN — ACETAMINOPHEN 975 MG: 325 TABLET ORAL at 11:48

## 2024-04-09 RX ADMIN — CLONIDINE HYDROCHLORIDE 0.2 MG: 0.1 TABLET ORAL at 06:15

## 2024-04-09 RX ADMIN — HEPARIN SODIUM 5000 UNITS: 5000 INJECTION INTRAVENOUS; SUBCUTANEOUS at 15:48

## 2024-04-09 RX ADMIN — CLONIDINE HYDROCHLORIDE 0.2 MG: 0.1 TABLET ORAL at 15:48

## 2024-04-09 RX ADMIN — HYDRALAZINE HYDROCHLORIDE 50 MG: 10 TABLET ORAL at 01:31

## 2024-04-09 RX ADMIN — DIPHENHYDRAMINE HYDROCHLORIDE 25 MG: 25 CAPSULE ORAL at 00:04

## 2024-04-09 RX ADMIN — ATORVASTATIN CALCIUM 80 MG: 80 TABLET, FILM COATED ORAL at 21:00

## 2024-04-09 RX ADMIN — VALSARTAN 160 MG: 160 TABLET, FILM COATED ORAL at 15:50

## 2024-04-09 RX ADMIN — SUCRALFATE 1 G: 1 TABLET ORAL at 06:15

## 2024-04-09 RX ADMIN — HYDRALAZINE HYDROCHLORIDE 100 MG: 10 TABLET ORAL at 15:49

## 2024-04-09 ASSESSMENT — COGNITIVE AND FUNCTIONAL STATUS - GENERAL
MOVING TO AND FROM BED TO CHAIR: A LITTLE
STANDING UP FROM CHAIR USING ARMS: A LITTLE
STANDING UP FROM CHAIR USING ARMS: A LITTLE
MOVING TO AND FROM BED TO CHAIR: A LITTLE
WALKING IN HOSPITAL ROOM: A LITTLE
CLIMB 3 TO 5 STEPS WITH RAILING: A LITTLE
STANDING UP FROM CHAIR USING ARMS: A LITTLE
DRESSING REGULAR UPPER BODY CLOTHING: A LITTLE
MOVING TO AND FROM BED TO CHAIR: A LITTLE
MOBILITY SCORE: 18
MOVING FROM LYING ON BACK TO SITTING ON SIDE OF FLAT BED WITH BEDRAILS: A LITTLE
HELP NEEDED FOR BATHING: A LITTLE
MOBILITY SCORE: 18
DAILY ACTIVITIY SCORE: 22
WALKING IN HOSPITAL ROOM: A LITTLE
MOVING FROM LYING ON BACK TO SITTING ON SIDE OF FLAT BED WITH BEDRAILS: A LITTLE
DRESSING REGULAR UPPER BODY CLOTHING: A LITTLE
MOVING TO AND FROM BED TO CHAIR: A LITTLE
CLIMB 3 TO 5 STEPS WITH RAILING: A LOT
HELP NEEDED FOR BATHING: A LITTLE
CLIMB 3 TO 5 STEPS WITH RAILING: A LITTLE
TURNING FROM BACK TO SIDE WHILE IN FLAT BAD: A LITTLE
MOBILITY SCORE: 17
DAILY ACTIVITIY SCORE: 22
WALKING IN HOSPITAL ROOM: A LITTLE
STANDING UP FROM CHAIR USING ARMS: A LITTLE
MOBILITY SCORE: 18
DRESSING REGULAR UPPER BODY CLOTHING: A LITTLE
WALKING IN HOSPITAL ROOM: A LITTLE
DAILY ACTIVITIY SCORE: 22
MOVING FROM LYING ON BACK TO SITTING ON SIDE OF FLAT BED WITH BEDRAILS: A LITTLE
MOVING FROM LYING ON BACK TO SITTING ON SIDE OF FLAT BED WITH BEDRAILS: A LITTLE
TURNING FROM BACK TO SIDE WHILE IN FLAT BAD: A LITTLE
TURNING FROM BACK TO SIDE WHILE IN FLAT BAD: A LITTLE
HELP NEEDED FOR BATHING: A LITTLE
TURNING FROM BACK TO SIDE WHILE IN FLAT BAD: A LITTLE
CLIMB 3 TO 5 STEPS WITH RAILING: A LITTLE

## 2024-04-09 ASSESSMENT — PAIN SCALES - GENERAL
PAINLEVEL_OUTOF10: 6
PAINLEVEL_OUTOF10: 0 - NO PAIN
PAINLEVEL_OUTOF10: 6
PAINLEVEL_OUTOF10: 0 - NO PAIN

## 2024-04-09 ASSESSMENT — PAIN SCALES - PAIN ASSESSMENT IN ADVANCED DEMENTIA (PAINAD)
BODYLANGUAGE: RELAXED
TOTALSCORE: 0
FACIALEXPRESSION: SMILING OR INEXPRESSIVE
FACIALEXPRESSION: SMILING OR INEXPRESSIVE
BODYLANGUAGE: NORMAL
TOTALSCORE: 0
CONSOLABILITY: NO NEED TO CONSOLE
CONSOLABILITY: NO NEED TO CONSOLE
BREATHING: NORMAL
BODYLANGUAGE: RELAXED

## 2024-04-09 ASSESSMENT — PAIN SCALES - WONG BAKER: WONGBAKER_NUMERICALRESPONSE: NO HURT

## 2024-04-09 ASSESSMENT — PAIN - FUNCTIONAL ASSESSMENT
PAIN_FUNCTIONAL_ASSESSMENT: 0-10
PAIN_FUNCTIONAL_ASSESSMENT: NO/DENIES PAIN

## 2024-04-09 ASSESSMENT — PAIN DESCRIPTION - LOCATION: LOCATION: HEAD

## 2024-04-09 NOTE — SIGNIFICANT EVENT
"Pt due for PM medications carvedilol 50mg, hydralazine 100mg, clonidine 0.2mg. /48 HR 71, asymptomatic. Pt initially declined to take PM meds as she was normotensive and she \"bottoms out fast\". Dicussed that her current regimen is what is keeping her normotensive and without it she may become hypertensive overnight that may require her returning to the ICU. She was aware of this and voiced understanding. She agreed to take the carvedilol and clonidine but not the hydralazine. She agreed to repeat BP measurement at midnight and take the hydralazine at that time if needed.    Update 4/9/24 12AM:  /48 HR 74. Pt amenable to hydralazine 50mg.  "

## 2024-04-09 NOTE — NURSING NOTE
Report to Receiving RN:    Report To: Ajit (RN)  Time Report Called: 1504 pm  Hand-Off Communication: vs- 183/81; HR 81; Pt removed 1.3 liters  Complications During Treatment: Yes, c/o headache  Ultrafiltration Treatment: No  Medications Administered During Dialysis: Yes, Tylenol 650 mg 1200 pm  Blood Products Administered During Dialysis: No  Labs Sent During Dialysis: No  Heparin Drip Rate Changes: No  Dialysis Catheter Dressing: Fistula  Last Dressing Change: fistula    Electronic Signatures:   (Signed Franchesca Jenkins RN)   Authored:    (Signed )   Authored:     Last Updated: 3:04 PM by FRANCHESCA JENKINS

## 2024-04-09 NOTE — PROGRESS NOTES
"Stacey Heath is a 53 y.o. female on day 6 of admission presenting with Shortness of breath.    Subjective   Patient doing well this morning when I saw her. She was having breakfast. She is cooperative and conversing. She was scheduled to get hemodialysis today.     Objective     Physical Exam    Constitutional:       Appearance: Normal appearance.   HENT:      Head: Normocephalic and atraumatic.      Mouth/Throat:      Mouth: Mucous membranes are moist.   Eyes:      Extraocular Movements: Extraocular movements intact.      Pupils: Pupils are equal, round, and reactive to light.   Cardiovascular:      Rate and Rhythm: Normal rate and regular rhythm.   Pulmonary:      Effort: Pulmonary effort is normal.      Breath sounds: Normal breath sounds.   Abdominal:      General: Abdomen is flat.      Palpations: Abdomen is soft.   Musculoskeletal:         General: Normal range of motion.      Cervical back: Normal range of motion and neck supple.   Neurological:      General: No focal deficit present.      Mental Status: She is alert and oriented to person, place, and time.   Psychiatric:         Mood and Affect: Mood mickey    Last Recorded Vitals  Blood pressure (!) 188/76, pulse 88, temperature 36.5 °C (97.7 °F), resp. rate 18, height 1.397 m (4' 7\"), weight 56.8 kg (125 lb 3.2 oz), SpO2 100 %.  Intake/Output last 3 Shifts:  I/O last 3 completed shifts:  In: 930 (16.7 mL/kg) [P.O.:930]  Out: - (0 mL/kg)   Dosing Weight: 55.7 kg     Relevant Results  aspirin, 81 mg, oral, Daily  atorvastatin, 80 mg, oral, Nightly  carvedilol, 50 mg, oral, BID  cloNIDine, 0.2 mg, oral, q8h BELEN  docusate sodium, 100 mg, oral, BID  fluticasone, 2 spray, Each Nostril, Daily  fluticasone furoate-vilanteroL, 1 puff, inhalation, Daily  heparin (porcine), 5,000 Units, subcutaneous, q8h BELEN  hydrALAZINE, 100 mg, oral, TID  isosorbide mononitrate ER, 120 mg, oral, Daily  multivitamin with minerals, 1 tablet, oral, Daily  NIFEdipine ER, 90 mg, oral, " Daily before breakfast  pantoprazole, 40 mg, oral, Daily  perflutren lipid microspheres, 0.5-10 mL of dilution, intravenous, Once in imaging  perflutren protein A microsphere, 0.5 mL, intravenous, Once in imaging  sucralfate, 1 g, oral, BID AC  sulfur hexafluoride microsphr, 2 mL, intravenous, Once in imaging  valsartan, 160 mg, oral, BID      Results from last 7 days   Lab Units 04/09/24  0804   WBC AUTO x10*3/uL 6.6   HEMOGLOBIN g/dL 9.1*   HEMATOCRIT % 28.7*   PLATELETS AUTO x10*3/uL 171     Results from last 7 days   Lab Units 04/09/24  0804   SODIUM mmol/L 137   POTASSIUM mmol/L 5.5*   CHLORIDE mmol/L 104   CO2 mmol/L 22   BUN mg/dL 64*   CREATININE mg/dL 9.79*   EGFR mL/min/1.73m*2 4*   GLUCOSE mg/dL 143*   CALCIUM mg/dL 9.0   PHOSPHORUS mg/dL 4.9     Results from last 7 days   Lab Units 04/03/24  1152   ALK PHOS U/L 102   BILIRUBIN TOTAL mg/dL 0.5   PROTEIN TOTAL g/dL 6.6   ALT U/L 8   AST U/L 14     Results from last 7 days   Lab Units 04/07/24  0414   APTT seconds 46*   INR  1.0       Assessment/Plan   Principal Problem:    Shortness of breath  Active Problems:    ESRD (end stage renal disease) on dialysis (CMS/[facility])    Ms. Stacey Heath is a 53 year old female with ESRD 2/2 HTN and T2DM and poorly controlled hypertension presenting with SOB in setting of hypertensive emergency despite stated medication and dialysis adherence. Admitted to CICU for management of hypertensive emergency. Restarted on PO meds as below. Transferred to the floor 4/8.      #Hypertensive emergency  #Pulmonary edema  #HFpEF  -Presented with /99, max at 265/88  - Running in the 140s-160s currently   -nitroglycerin gtt and esmolo gtt weaned off, currently on nicardipine 10  - Clonidine 0.2mg Tid  - [id number] bid  - Coreg to 50 BID  - Hydral 100 TID  - Imdur 120 daily   - Nifedipine 90 daily   - STOPPED minoxidil 5   - continue home atorvastatin 80mg   -Has follow up with endocrine for evaluation for secondary causes of  hypertension      #AHRF 2/2 volume overload/moderate pulmonary edema  -HD as scheuled, nephro on board   -goal O2 sat >94%, titrate oxygen as needed     #COPD?   -following with pulm fellow clinic at , no PFTs yet to confirm COPD diagnosis  -planned for outpatient CT chest, PFTs, and sleep study  -continue home albuterol prn and breo ellipta     #History of Covid-19  -diagnosed on last admission 2/28/24  -no need for isolation     #ESRD on dialysis T/Th/Sat via RUE AVF, makes 1 cup urine daily  #history of kidney stones  #Hypervolemia  -Nephro on board  -Dialysis per schedule   -dry weight reported as 52kg  -renally dose meds, avoid nephrotoxic medications. Patient did receive contrast for CTA aorta   -Renal appointment 4/4/24 missed while inpatient had been scheduled with Dr. Barraza, will need to  be rescheduled      -Chronic diarrhea, described as coming for a few weeks post prandial happens only after she eats something and she has 4-5 Bms that are watery, no BM as long as she is not eating and it resolves on its own   -Currently resolved      #T2DM c/b retinopathy, nephropathy and neuropathy  -no medications  -Glucose 70s-140s     #Anemia of chronic disease I/s/o ESRD  -on Epo outpatient   -daily cbc     #Nausea  -C/w home metoclopramide      F: PRN  E: prn, ESRD  N: renal diet  A: L radial arterial line 4/3/24, RUE AVF  DVT ppx: SQH  GI ppx: home PPI    Code Status: Full code (confirmed on admission)  Surrogate Medical Decision-maker:      Daughter, Diya Jang [phone]  and Rohini Piña [phone]   Duke Hewtit MD

## 2024-04-09 NOTE — NURSING NOTE
Pt arrived on T7 approximately 18:30 pm. Pt was placed on tele, report received, vs taken (WDL), and an assessment was given: agree with previous RN assessment from CICU as reported and charted. Report was given to night nurse at shift change.

## 2024-04-09 NOTE — NURSING NOTE
Report from Sending RN:    Report From: Ajit  Recent Surgery of Procedure: No  Baseline Level of Consciousness (LOC): A and O x 4  Oxygen Use: Yes  Type: 2  Diabetic: No  Last BP Med Given Day of Dialysis: 155/56, did not want meds   Last Pain Med Given: None  Lab Tests to be Obtained with Dialysis: No  Blood Transfusion to be Given During Dialysis: No  Available IV Access: Yes  Medications to be Administered During Dialysis: No  Continuous IV Infusion Running: Yes  Restraints on Currently or in the Last 24 Hours: No  Hand-Off Communication: Pt had a walking test done and does not need O2 but feels more comfortable with it.  Dialysis Catheter Dressing: NA  Last Dressing Change: NA

## 2024-04-09 NOTE — PROGRESS NOTES
Renal Staff HD Note    Patient seen, examined on dialysis   Tolerating treatment without event  57.7 AVF 3K  111/33     Continue treatment per submitted orders   2L

## 2024-04-09 NOTE — PROGRESS NOTES
Physical Therapy    Physical Therapy Treatment    Patient Name: Stacey Heath  MRN: [id number]  Today's Date: 4/9/2024  Time Calculation  Start Time: 1554  Stop Time: 1609  Time Calculation (min): 15 min       Assessment/Plan   PT Assessment  End of Session Communication: Bedside nurse  Assessment Comment: Pt tolerated session with fatigue but no SOB.  Session limited by pt's meal arriving and pt requesting to eat due to not being able to eat all day with dialysis.  Pt educated on seated and supine HEP.  Pt continues to benefit from skilled PT and remains appropriate for low intensity PT upon discharge pending stair training.  End of Session Patient Position: Up in chair, Alarm off, caregiver present (NCP in room)  PT Plan  Inpatient/Swing Bed or Outpatient: Inpatient  PT Plan  Treatment/Interventions: Bed mobility, Transfer training, Gait training, Stair training, Balance training, Strengthening, Endurance training  PT Plan: Skilled PT  PT Frequency: 3 times per week  PT Discharge Recommendations: Low intensity level of continued care  PT Recommended Transfer Status: Stand by assist, Assistive device (RW)  PT - OK to Discharge: Yes      General Visit Information:   PT  Visit  PT Received On: 04/09/24  General  Prior to Session Communication: Bedside nurse  Patient Position Received: Bed, 3 rail up, Alarm off, not on at start of session  General Comment: Pt cleared for PT by RN.  Pt alert and agreeable to PT.    Subjective   Precautions:  Precautions  Medical Precautions: Cardiac precautions, Fall precautions, Oxygen therapy device and L/min (3L O2 via NC)  Vital Signs:  Vital Signs  Heart Rate: 88  Heart Rate Source: Monitor    Objective   Pain:  Pain Assessment  Pain Assessment: 0-10  Pain Score: 6  Pain Type: Acute pain  Pain Location:  (headache)  Cognition:  Cognition  Overall Cognitive Status: Within Functional Limits  Postural Control:  Postural Control  Posture Comment: WFL  Static Sitting Balance  Static  Sitting-Balance Support: No upper extremity supported, Feet supported  Static Sitting-Level of Assistance: Independent  Dynamic Sitting Balance  Dynamic Sitting-Balance Support: Bilateral upper extremity supported, Feet supported  Dynamic Sitting-Balance: Forward lean  Dynamic Sitting-Comments: Independent  Static Standing Balance  Static Standing-Balance Support: Bilateral upper extremity supported (RW)  Static Standing-Level of Assistance: Close supervision  Dynamic Standing Balance  Dynamic Standing-Balance Support: Bilateral upper extremity supported (RW)  Dynamic Standing-Balance: Turning  Dynamic Standing-Comments: close supervision  Activity Tolerance:  Activity Tolerance  Endurance: Tolerates 10 - 20 min exercise with multiple rests  Treatments:  Therapeutic Exercise  Therapeutic Exercise Performed: No (edu on HEP:seated marches, LAQ, ankle pumps, and heel slides)    Therapeutic Activity  Therapeutic Activity Performed: Yes  Therapeutic Activity 1: patient education, vital sign monitoring, transfers, bed mobility    Bed Mobility  Bed Mobility: Yes  Bed Mobility 1  Bed Mobility 1: Supine to sitting  Level of Assistance 1: Modified independent (HOB maximally elevated)    Ambulation/Gait Training  Ambulation/Gait Training Performed: Yes  Ambulation/Gait Training 1  Surface 1: Level tile  Device 1: No device  Assistance 1: Close supervision  Quality of Gait 1: Decreased step length, Narrow base of support, Shuffling gait  Comments/Distance (ft) 1: 3ft reaching for UE support on counter and wall  Ambulation/Gait Training 2  Surface 2: Level tile  Device 2: Rolling walker  Assistance 2: Close supervision  Quality of Gait 2: Narrow base of support, Inconsistent stride length, Decreased step length  Comments/Distance (ft) 2: 50ft, seated rest break, 52ft  Transfers  Transfer: Yes  Transfer 1  Transfer From 1: Bed to  Transfer to 1: Stand  Technique 1: Sit to stand  Transfer Device 1:  (no AD)  Transfer Level of  Assistance 1: Close supervision    Outcome Measures:  [facility] Basic Mobility  Turning from your back to your side while in a flat bed without using bedrails: A little  Moving from lying on your back to sitting on the side of a flat bed without using bedrails: A little  Moving to and from bed to chair (including a wheelchair): A little  Standing up from a chair using your arms (e.g. wheelchair or bedside chair): A little  To walk in hospital room: A little  Climbing 3-5 steps with railing: A little  Basic Mobility - Total Score: 18    Education Documentation  Home Exercise Program, taught by Areli Flaherty PT at 4/9/2024  4:26 PM.  Learner: Patient  Readiness: Acceptance  Method: Explanation  Response: Verbalizes Understanding    Mobility Training, taught by Areli Flaherty PT at 4/9/2024  4:26 PM.  Learner: Patient  Readiness: Acceptance  Method: Explanation  Response: Verbalizes Understanding    Education Comments  No comments found.        Encounter Problems       Encounter Problems (Active)       Balance       Pt will score >24 on Tinetti for low risk of falls (Progressing)       Start:  04/05/24    Expected End:  04/19/24               Mobility       Patient will ambulate with LRAD >500 ft with supervision (Progressing)       Start:  04/05/24    Expected End:  04/19/24            Patient will ascend and descend 12 stairs with B handrails with supervision (Progressing)       Start:  04/05/24    Expected End:  04/19/24            Pt will complete 6MWT with LRAD and supervision and amb >600 ft (Progressing)       Start:  04/05/24    Expected End:  04/19/24               PT Transfers       Patient will perform bed mobility indep (Progressing)       Start:  04/05/24    Expected End:  04/19/24            Patient will transfer sit to and from stand indep (Progressing)       Start:  04/05/24    Expected End:  04/19/24

## 2024-04-09 NOTE — PROGRESS NOTES
Physical Therapy                 Therapy Communication Note    Patient Name: Stacey Heath  MRN: [id number]  Today's Date: 4/9/2024     Discipline: Physical Therapy    Missed Visit Reason: Missed Visit Reason:  (pt is at dialysis.  PT will re-attempt as schedule allows)    Missed Time: Attempt 7857

## 2024-04-10 ENCOUNTER — HOSPITAL ENCOUNTER (OUTPATIENT)
Dept: RADIOLOGY | Facility: HOSPITAL | Age: 54
Discharge: HOME | End: 2024-04-10
Payer: COMMERCIAL

## 2024-04-10 LAB
ALBUMIN SERPL BCP-MCNC: 2.9 G/DL (ref 3.4–5)
ANION GAP SERPL CALC-SCNC: 12 MMOL/L (ref 10–20)
BASOPHILS # BLD AUTO: 0.03 X10*3/UL (ref 0–0.1)
BASOPHILS NFR BLD AUTO: 0.6 %
BUN SERPL-MCNC: 41 MG/DL (ref 6–23)
CALCIUM SERPL-MCNC: 8.7 MG/DL (ref 8.6–10.6)
CHLORIDE SERPL-SCNC: 100 MMOL/L (ref 98–107)
CO2 SERPL-SCNC: 31 MMOL/L (ref 21–32)
CREAT SERPL-MCNC: 6.79 MG/DL (ref 0.5–1.05)
EGFRCR SERPLBLD CKD-EPI 2021: 7 ML/MIN/1.73M*2
EOSINOPHIL # BLD AUTO: 0.31 X10*3/UL (ref 0–0.7)
EOSINOPHIL NFR BLD AUTO: 6 %
ERYTHROCYTE [DISTWIDTH] IN BLOOD BY AUTOMATED COUNT: 16 % (ref 11.5–14.5)
FLUAV RNA RESP QL NAA+PROBE: NOT DETECTED
FLUBV RNA RESP QL NAA+PROBE: NOT DETECTED
GLUCOSE SERPL-MCNC: 139 MG/DL (ref 74–99)
HCT VFR BLD AUTO: 28.6 % (ref 36–46)
HGB BLD-MCNC: 8.6 G/DL (ref 12–16)
IMM GRANULOCYTES # BLD AUTO: 0.03 X10*3/UL (ref 0–0.7)
IMM GRANULOCYTES NFR BLD AUTO: 0.6 % (ref 0–0.9)
LYMPHOCYTES # BLD AUTO: 0.75 X10*3/UL (ref 1.2–4.8)
LYMPHOCYTES NFR BLD AUTO: 14.5 %
MAGNESIUM SERPL-MCNC: 2.12 MG/DL (ref 1.6–2.4)
MCH RBC QN AUTO: 28.8 PG (ref 26–34)
MCHC RBC AUTO-ENTMCNC: 30.1 G/DL (ref 32–36)
MCV RBC AUTO: 96 FL (ref 80–100)
MONOCYTES # BLD AUTO: 0.54 X10*3/UL (ref 0.1–1)
MONOCYTES NFR BLD AUTO: 10.4 %
NEUTROPHILS # BLD AUTO: 3.51 X10*3/UL (ref 1.2–7.7)
NEUTROPHILS NFR BLD AUTO: 67.9 %
NRBC BLD-RTO: 0 /100 WBCS (ref 0–0)
PHOSPHATE SERPL-MCNC: 4.2 MG/DL (ref 2.5–4.9)
PLATELET # BLD AUTO: 187 X10*3/UL (ref 150–450)
POTASSIUM SERPL-SCNC: 4.4 MMOL/L (ref 3.5–5.3)
RBC # BLD AUTO: 2.99 X10*6/UL (ref 4–5.2)
SARS-COV-2 RNA RESP QL NAA+PROBE: NOT DETECTED
SODIUM SERPL-SCNC: 139 MMOL/L (ref 136–145)
WBC # BLD AUTO: 5.2 X10*3/UL (ref 4.4–11.3)

## 2024-04-10 PROCEDURE — 80069 RENAL FUNCTION PANEL: CPT

## 2024-04-10 PROCEDURE — 2500000001 HC RX 250 WO HCPCS SELF ADMINISTERED DRUGS (ALT 637 FOR MEDICARE OP)

## 2024-04-10 PROCEDURE — 83735 ASSAY OF MAGNESIUM: CPT

## 2024-04-10 PROCEDURE — 1100000001 HC PRIVATE ROOM DAILY

## 2024-04-10 PROCEDURE — 2500000002 HC RX 250 W HCPCS SELF ADMINISTERED DRUGS (ALT 637 FOR MEDICARE OP, ALT 636 FOR OP/ED): Mod: MUE

## 2024-04-10 PROCEDURE — 2500000002 HC RX 250 W HCPCS SELF ADMINISTERED DRUGS (ALT 637 FOR MEDICARE OP, ALT 636 FOR OP/ED)

## 2024-04-10 PROCEDURE — 99232 SBSQ HOSP IP/OBS MODERATE 35: CPT

## 2024-04-10 PROCEDURE — 99233 SBSQ HOSP IP/OBS HIGH 50: CPT | Performed by: NURSE PRACTITIONER

## 2024-04-10 PROCEDURE — 87636 SARSCOV2 & INF A&B AMP PRB: CPT

## 2024-04-10 PROCEDURE — 85025 COMPLETE CBC W/AUTO DIFF WBC: CPT

## 2024-04-10 PROCEDURE — RXMED WILLOW AMBULATORY MEDICATION CHARGE

## 2024-04-10 PROCEDURE — 2500000004 HC RX 250 GENERAL PHARMACY W/ HCPCS (ALT 636 FOR OP/ED)

## 2024-04-10 RX ORDER — BUTALBITAL, ACETAMINOPHEN AND CAFFEINE 50; 325; 40 MG/1; MG/1; MG/1
1 TABLET ORAL ONCE
Status: COMPLETED | OUTPATIENT
Start: 2024-04-10 | End: 2024-04-10

## 2024-04-10 RX ORDER — CARVEDILOL 25 MG/1
50 TABLET ORAL 2 TIMES DAILY
Qty: 120 TABLET | Refills: 0 | Status: SHIPPED | OUTPATIENT
Start: 2024-04-10 | End: 2024-04-24 | Stop reason: SDUPTHER

## 2024-04-10 RX ORDER — NIFEDIPINE 90 MG/1
90 TABLET, EXTENDED RELEASE ORAL
Qty: 30 TABLET | Refills: 0 | Status: SHIPPED | OUTPATIENT
Start: 2024-04-11 | End: 2024-04-24 | Stop reason: SDUPTHER

## 2024-04-10 RX ORDER — ISOSORBIDE MONONITRATE 120 MG/1
120 TABLET, EXTENDED RELEASE ORAL DAILY
Qty: 30 TABLET | Refills: 0 | Status: SHIPPED | OUTPATIENT
Start: 2024-04-11 | End: 2024-05-29

## 2024-04-10 RX ORDER — HYDRALAZINE HYDROCHLORIDE 100 MG/1
100 TABLET, FILM COATED ORAL 3 TIMES DAILY
Qty: 69 TABLET | Refills: 0 | Status: SHIPPED | OUTPATIENT
Start: 2024-04-10 | End: 2024-04-24 | Stop reason: SDUPTHER

## 2024-04-10 RX ORDER — CLONIDINE HYDROCHLORIDE 0.2 MG/1
0.2 TABLET ORAL EVERY 8 HOURS SCHEDULED
Qty: 90 TABLET | Refills: 0 | Status: SHIPPED | OUTPATIENT
Start: 2024-04-10 | End: 2024-04-24 | Stop reason: SDUPTHER

## 2024-04-10 RX ADMIN — PANTOPRAZOLE SODIUM 40 MG: 40 TABLET, DELAYED RELEASE ORAL at 08:19

## 2024-04-10 RX ADMIN — HYDRALAZINE HYDROCHLORIDE 100 MG: 10 TABLET ORAL at 15:01

## 2024-04-10 RX ADMIN — CARVEDILOL 50 MG: 25 TABLET, FILM COATED ORAL at 21:27

## 2024-04-10 RX ADMIN — HEPARIN SODIUM 5000 UNITS: 5000 INJECTION INTRAVENOUS; SUBCUTANEOUS at 21:27

## 2024-04-10 RX ADMIN — VALSARTAN 160 MG: 160 TABLET, FILM COATED ORAL at 08:19

## 2024-04-10 RX ADMIN — CLONIDINE HYDROCHLORIDE 0.2 MG: 0.1 TABLET ORAL at 15:01

## 2024-04-10 RX ADMIN — HYDRALAZINE HYDROCHLORIDE 100 MG: 10 TABLET ORAL at 21:27

## 2024-04-10 RX ADMIN — HEPARIN SODIUM 5000 UNITS: 5000 INJECTION INTRAVENOUS; SUBCUTANEOUS at 15:01

## 2024-04-10 RX ADMIN — SUCRALFATE 1 G: 1 TABLET ORAL at 08:19

## 2024-04-10 RX ADMIN — Medication 1 TABLET: at 08:19

## 2024-04-10 RX ADMIN — HYDRALAZINE HYDROCHLORIDE 100 MG: 10 TABLET ORAL at 08:18

## 2024-04-10 RX ADMIN — ACETAMINOPHEN 975 MG: 325 TABLET ORAL at 06:37

## 2024-04-10 RX ADMIN — VALSARTAN 160 MG: 160 TABLET, FILM COATED ORAL at 21:27

## 2024-04-10 RX ADMIN — CLONIDINE HYDROCHLORIDE 0.2 MG: 0.1 TABLET ORAL at 06:26

## 2024-04-10 RX ADMIN — DIPHENHYDRAMINE HYDROCHLORIDE 25 MG: 25 CAPSULE ORAL at 17:52

## 2024-04-10 RX ADMIN — NIFEDIPINE 90 MG: 90 TABLET, FILM COATED, EXTENDED RELEASE ORAL at 08:19

## 2024-04-10 RX ADMIN — ACETAMINOPHEN 975 MG: 325 TABLET ORAL at 21:26

## 2024-04-10 RX ADMIN — SUCRALFATE 1 G: 1 TABLET ORAL at 15:03

## 2024-04-10 RX ADMIN — SUMATRIPTAN SUCCINATE 50 MG: 50 TABLET ORAL at 22:31

## 2024-04-10 RX ADMIN — ISOSORBIDE MONONITRATE 120 MG: 60 TABLET, EXTENDED RELEASE ORAL at 08:19

## 2024-04-10 RX ADMIN — ASPIRIN 81 MG: 81 TABLET, COATED ORAL at 08:19

## 2024-04-10 RX ADMIN — HEPARIN SODIUM 5000 UNITS: 5000 INJECTION INTRAVENOUS; SUBCUTANEOUS at 06:26

## 2024-04-10 RX ADMIN — BUTALBITAL, ACETAMINOPHEN, AND CAFFEINE 1 TABLET: 325; 50; 40 TABLET ORAL at 09:19

## 2024-04-10 RX ADMIN — ATORVASTATIN CALCIUM 80 MG: 80 TABLET, FILM COATED ORAL at 21:27

## 2024-04-10 RX ADMIN — CARVEDILOL 50 MG: 25 TABLET, FILM COATED ORAL at 08:19

## 2024-04-10 RX ADMIN — CLONIDINE HYDROCHLORIDE 0.2 MG: 0.1 TABLET ORAL at 21:27

## 2024-04-10 ASSESSMENT — COGNITIVE AND FUNCTIONAL STATUS - GENERAL
CLIMB 3 TO 5 STEPS WITH RAILING: A LITTLE
WALKING IN HOSPITAL ROOM: A LITTLE
MOBILITY SCORE: 24
MOBILITY SCORE: 22
DAILY ACTIVITIY SCORE: 24
DAILY ACTIVITIY SCORE: 24

## 2024-04-10 ASSESSMENT — PAIN SCALES - GENERAL
PAINLEVEL_OUTOF10: 0 - NO PAIN
PAINLEVEL_OUTOF10: 5 - MODERATE PAIN

## 2024-04-10 ASSESSMENT — PAIN DESCRIPTION - DESCRIPTORS: DESCRIPTORS: HEADACHE

## 2024-04-10 ASSESSMENT — PAIN - FUNCTIONAL ASSESSMENT: PAIN_FUNCTIONAL_ASSESSMENT: 0-10

## 2024-04-10 NOTE — HOSPITAL COURSE
53 y.o. female with PMH ESRD 2/2 HTN and diabetes on dialysis T/Th/Sat (last complete session 4/2/24) and poorly controlled hypertension, DMT2, and COPD (baseline 1-2L NC) who presented to the ED 4/3/24 with SOB on waking and a headache. BP found to be in 230's/90's, CXR with pulmonary edena, labs notable for Cr 6.12, BNP 2016, troponin 193--> 116. Does not wear oxygen at home but notes that she often requires oxygen when she is fluid overloaded (1-2L). Currently being evaluated for kidney transplant and was noted to be significantly SOB at recent appointment 3/29/24, planned for outpatient CT chest, PFTs and sleep study. CT angio aorta and b/l ileofemoral to rule out aortic dissection. Admitted to CICU for hypertensive Emergency.      On arrival to CICU, patient awake and in no distress. Complains of feeling fluid overloaded by facial puffiness and 7/10 frontal headache that does not feel like her typical migraine headache. During her stay she was initially maintained on nitroglycerin gtt and then had to add esmolol gtt. She eventually needed 3 drips to with adding cardene gtt. CTA angio was done initially for concern of aortic dissection but was unremarkable for an aortic pathology.      During her stay in the ICU:  Hydralazine from 50 TID to 100 TID  Carvedilol from 25 BID to 50 BID  Clonidine from 0.1 BID to 0.2 TID  Amlodipine Dced and started on nifedipine 90  Imdur from 30 daily to 120 daily   Valsartan kept at 160 BID   Started on minoxidil 5 daily (Stopped on transfer to floor)    She was transferred to the floor on 4/8, she was hemodynamically stable and received her scheduled hemodialysis. Minoxidil was stopped. Continuing other medications. She has follow up appointment with endocrinology for secondary causes of hypertension. She had a midline placed, complained of pain and it was removed. She continued to complain of pain and an ultrasound was done which showed acute DVT. She was started eliquis 5 mg  BID. Now she has new complaints of chest pain which is aggravated by coughing and talking. EKG and troponin unremarkable. CXR ordered. Patient is hemodynamically stable now and is ready to be discharged

## 2024-04-10 NOTE — PROGRESS NOTES
"Stacey Heath is a 53 y.o. female on day 7 of admission presenting with Shortness of breath.    Subjective   Pt resting in bed. States a little sob _(states she asked staff to take o2 off because she wanted to see how she would feel without it), denies n/v, fever, cough, chills, chest pains, headache pain is improving, has not had a stool today       Objective     Physical Exam  Vitals and nursing note reviewed.   Constitutional:       Appearance: She is ill-appearing.   Cardiovascular:      Rate and Rhythm: Normal rate and regular rhythm.      Comments: SR 76  Pulmonary:      Comments: Mckinley lung sounds with crackles to bases-declined IUF  O2 3L-on hold per pt request  Also states she uses inhalers which are effective  Abdominal:      General: There is distension.      Comments: Dist-non tender to palpation   Genitourinary:     Comments: States make urine  Musculoskeletal:      Comments: Ble without edema   Skin:     General: Skin is warm and dry.   Neurological:      Mental Status: She is oriented to person, place, and time.   Psychiatric:         Mood and Affect: Mood normal.         Behavior: Behavior normal.         Last Recorded Vitals  Blood pressure 153/59, pulse 77, temperature 36.5 °C (97.7 °F), resp. rate 18, height 1.397 m (4' 7\"), weight 55.8 kg (123 lb), SpO2 97%.  Intake/Output last 3 Shifts:  I/O last 3 completed shifts:  In: 2000 (35.9 mL/kg) [P.O.:800; I.V.:800 (14.4 mL/kg); Other:400]  Out: 1697 (30.5 mL/kg) [Other:1697]  Dosing Weight: 55.7 kg     Relevant Results          Scheduled medications  aspirin, 81 mg, oral, Daily  atorvastatin, 80 mg, oral, Nightly  carvedilol, 50 mg, oral, BID  cloNIDine, 0.2 mg, oral, q8h BELEN  docusate sodium, 100 mg, oral, BID  [START ON 4/11/2024] epoetin joceline or biosimilar, 6,000 Units, intravenous, Once per day on Tuesday Thursday Saturday  fluticasone, 2 spray, Each Nostril, Daily  fluticasone furoate-vilanteroL, 1 puff, inhalation, Daily  heparin (porcine), 5,000 " Units, subcutaneous, q8h BELEN  hydrALAZINE, 100 mg, oral, TID  isosorbide mononitrate ER, 120 mg, oral, Daily  multivitamin with minerals, 1 tablet, oral, Daily  NIFEdipine ER, 90 mg, oral, Daily before breakfast  pantoprazole, 40 mg, oral, Daily  perflutren lipid microspheres, 0.5-10 mL of dilution, intravenous, Once in imaging  perflutren protein A microsphere, 0.5 mL, intravenous, Once in imaging  sucralfate, 1 g, oral, BID AC  sulfur hexafluoride microsphr, 2 mL, intravenous, Once in imaging  valsartan, 160 mg, oral, BID      Continuous medications     PRN medications  PRN medications: acetaminophen, albuterol, diphenhydrAMINE, hydrOXYzine HCL, metoclopramide, oxygen, prochlorperazine **OR** prochlorperazine **OR** prochlorperazine, sodium chloride, SUMAtriptan         Results for orders placed or performed during the hospital encounter of 04/03/24 (from the past 24 hour(s))   POCT GLUCOSE   Result Value Ref Range    POCT Glucose 168 (H) 74 - 99 mg/dL   Sars-CoV-2 and Influenza A/B PCR   Result Value Ref Range    Flu A Result Not Detected Not Detected    Flu B Result Not Detected Not Detected    Coronavirus 2019, PCR Not Detected Not Detected   Renal Function Panel   Result Value Ref Range    Glucose 139 (H) 74 - 99 mg/dL    Sodium 139 136 - 145 mmol/L    Potassium 4.4 3.5 - 5.3 mmol/L    Chloride 100 98 - 107 mmol/L    Bicarbonate 31 21 - 32 mmol/L    Anion Gap 12 10 - 20 mmol/L    Urea Nitrogen 41 (H) 6 - 23 mg/dL    Creatinine 6.79 (H) 0.50 - 1.05 mg/dL    eGFR 7 (L) >60 mL/min/1.73m*2    Calcium 8.7 8.6 - 10.6 mg/dL    Phosphorus 4.2 2.5 - 4.9 mg/dL    Albumin 2.9 (L) 3.4 - 5.0 g/dL   Magnesium   Result Value Ref Range    Magnesium 2.12 1.60 - 2.40 mg/dL        Assessment/Plan   Principal Problem:    Shortness of breath  Active Problems:    ESRD (end stage renal disease) on dialysis (CMS/[facility])    Did not Tolerate hemodialysis yesterday with net fluid loss 1300. Uf off with pt with c/o cramping about last 30min  of session    Bp stable with tx, hypervolemic on exam and has stable electrolytes . K+=5.5.. labs drawn awaiting repeat results     Outpatient Dialysis schedule:   TTS @ [facility]/Dr. Garcia     Access: rt fist with +thrill/bruit- no issues - able to achieve   4/8--Lt arm midline     Anemia of ESRD: 4/10-epogen 6,000 units on dialysis days.. current hgb 9.1... will cont to monitor       CKD-MBD Phosphate Binder:multivitamin with minerals 1 tablet daily     Plan HD tomorrow with UF as tolerated     Renal diet      Please obtain daily standing wt (if possible)     Medication to be adjusted for ESRD      Patient to continue regular HD schedule while inpatient and to follow with the outpatient nephrologist at discharge        LILLY Rebollar-CNP

## 2024-04-10 NOTE — PROGRESS NOTES
"Stacey Heath is a 53 y.o. female on day 7 of admission presenting with Shortness of breath.    Subjective   Patient complained of bilateral frontal headache. She also feels dizzy and had 2 episodes of diarrhea. Her blood pressure seems to be controlled, testing for flu and covid    Objective     Physical Exam    Constitutional:       Appearance: Normal appearance.   HENT:      Head: Normocephalic and atraumatic.      Mouth/Throat:      Mouth: Mucous membranes are moist.   Eyes:      Extraocular Movements: Extraocular movements intact.      Pupils: Pupils are equal, round, and reactive to light.   Cardiovascular:      Rate and Rhythm: Normal rate and regular rhythm.   Pulmonary:      Effort: Pulmonary effort is normal.      Breath sounds: Normal breath sounds.   Abdominal:      General: Abdomen is flat.      Palpations: Abdomen is soft.   Musculoskeletal:         General: Normal range of motion.      Cervical back: Normal range of motion and neck supple.   Neurological:      General: No focal deficit present.      Mental Status: She is alert and oriented to person, place, and time.   Psychiatric:         Mood and Affect: Mood mickey    Last Recorded Vitals  Blood pressure 140/52, pulse 87, temperature 36.3 °C (97.3 °F), temperature source Temporal, resp. rate 18, height 1.397 m (4' 7\"), weight 55.8 kg (123 lb), SpO2 96%.  Intake/Output last 3 Shifts:  I/O last 3 completed shifts:  In: 2000 (35.9 mL/kg) [P.O.:800; I.V.:800 (14.4 mL/kg); Other:400]  Out: 1697 (30.5 mL/kg) [Other:1697]  Dosing Weight: 55.7 kg     Relevant Results  aspirin, 81 mg, oral, Daily  atorvastatin, 80 mg, oral, Nightly  carvedilol, 50 mg, oral, BID  cloNIDine, 0.2 mg, oral, q8h BELEN  docusate sodium, 100 mg, oral, BID  [START ON 4/11/2024] epoetin joceline or biosimilar, 6,000 Units, intravenous, Once per day on Tuesday Thursday Saturday  fluticasone, 2 spray, Each Nostril, Daily  fluticasone furoate-vilanteroL, 1 puff, inhalation, Daily  heparin " (porcine), 5,000 Units, subcutaneous, q8h BELEN  hydrALAZINE, 100 mg, oral, TID  isosorbide mononitrate ER, 120 mg, oral, Daily  multivitamin with minerals, 1 tablet, oral, Daily  NIFEdipine ER, 90 mg, oral, Daily before breakfast  pantoprazole, 40 mg, oral, Daily  perflutren lipid microspheres, 0.5-10 mL of dilution, intravenous, Once in imaging  perflutren protein A microsphere, 0.5 mL, intravenous, Once in imaging  sucralfate, 1 g, oral, BID AC  sulfur hexafluoride microsphr, 2 mL, intravenous, Once in imaging  valsartan, 160 mg, oral, BID      Results from last 7 days   Lab Units 04/09/24  0804   WBC AUTO x10*3/uL 6.6   HEMOGLOBIN g/dL 9.1*   HEMATOCRIT % 28.7*   PLATELETS AUTO x10*3/uL 171     Results from last 7 days   Lab Units 04/09/24  0804   SODIUM mmol/L 137   POTASSIUM mmol/L 5.5*   CHLORIDE mmol/L 104   CO2 mmol/L 22   BUN mg/dL 64*   CREATININE mg/dL 9.79*   EGFR mL/min/1.73m*2 4*   GLUCOSE mg/dL 143*   CALCIUM mg/dL 9.0   PHOSPHORUS mg/dL 4.9     Results from last 7 days   Lab Units 04/03/24  1152   ALK PHOS U/L 102   BILIRUBIN TOTAL mg/dL 0.5   PROTEIN TOTAL g/dL 6.6   ALT U/L 8   AST U/L 14     Results from last 7 days   Lab Units 04/07/24  0414   APTT seconds 46*   INR  1.0       Assessment/Plan   Principal Problem:    Shortness of breath  Active Problems:    ESRD (end stage renal disease) on dialysis (CMS/[facility])    Ms. Stacey Heath is a 53 year old female with ESRD 2/2 HTN and T2DM and poorly controlled hypertension presenting with SOB in setting of hypertensive emergency despite stated medication and dialysis adherence. Admitted to CICU for management of hypertensive emergency. Restarted on PO meds as below. Transferred to the floor 4/8.      UPDATES 4/10  - Testing for Flu/Covid, patient complains of headache, diarrhea, chills and congestion  - Continuing current BP regimen, vitals stable    #Hypertensive emergency  #Pulmonary edema  #HFpEF  -Presented with /99, max at 265/88  - Running in the  140s-160s currently   -nitroglycerin gtt and esmolo gtt weaned off, currently on nicardipine 10  - Clonidine 0.2mg Tid  - [id number] bid  - Coreg to 50 BID  - Hydral 100 TID  - Imdur 120 daily   - Nifedipine 90 daily   - STOPPED minoxidil 5   - continue home atorvastatin 80mg   -Has follow up with endocrine for evaluation for secondary causes of hypertension      #AHRF 2/2 volume overload/moderate pulmonary edema  -HD as scheuled, nephro on board   -goal O2 sat >94%, titrate oxygen as needed     #COPD?   -following with pulm fellow clinic at , no PFTs yet to confirm COPD diagnosis  -planned for outpatient CT chest, PFTs, and sleep study  -continue home albuterol prn and breo ellipta     #History of Covid-19  -diagnosed on last admission 2/28/24  -no need for isolation     #ESRD on dialysis T/Th/Sat via RUE AVF, makes 1 cup urine daily  #history of kidney stones  #Hypervolemia  -Nephro on board  -Dialysis per schedule   -dry weight reported as 52kg  -renally dose meds, avoid nephrotoxic medications. Patient did receive contrast for CTA aorta   -Renal appointment 4/4/24 missed while inpatient had been scheduled with Dr. Barraza, will need to  be rescheduled      -Chronic diarrhea, described as coming for a few weeks post prandial happens only after she eats something and she has 4-5 Bms that are watery, no BM as long as she is not eating and it resolves on its own   -Currently resolved      #T2DM c/b retinopathy, nephropathy and neuropathy  -no medications  -Glucose 70s-140s     #Anemia of chronic disease I/s/o ESRD  -on Epo outpatient   -daily cbc     #Nausea  -C/w home metoclopramide      F: PRN  E: prn, ESRD  N: renal diet  A: L radial arterial line 4/3/24, RUE AVF  DVT ppx: SQH  GI ppx: home PPI    Code Status: Full code (confirmed on admission)  Surrogate Medical Decision-maker:      Diya Cramer ([id number] - [phone]  and Rohini Piña [phone]   Duke Hewitt MD

## 2024-04-10 NOTE — DISCHARGE INSTRUCTIONS
Trista Ms. Heath,  You were admitted for increased blood pressure which required you to be in the medical ICU. You had to be on 3 IV blood pressure medications to resolve this issue. Some of your home blood pressure medication doses were increased on this hospital stay (please see below). Your blood pressure was controlled and you were transferred to the regular medicine floor.  While on the floor you developed a blood clot in your left arm. For the clot you have been started on a blood thinner called Eliquis. This medication should be taken twice a day for 3 months. An endocrine and primary care referral has been sent.       REMEMBER  [ ] please follow up with endocrinology  [ ] please follow up with PCP (Regarding blood thinner)  [ ] please talk to outpatient nephrologist (regarding establishing new dry weight, in setting of weight loss)  [ ]PLEASE TAKE ELIQUIS  as prescribed on bottle    Medication Changes:    Please START taking:  Nifedipine 90 mg tablet by mouth once daily in the morning  Eliquis twice a day    Please CHANGE how you take:  Coreg 25 mg (50 mg) take 2 tablets by mouth twice daily  Catapres 0.2 mg take 1 tablet by mouth 3 times a day (Morning, afternoon and evening)  Hydralazine 100 mg by mouth 3 times a day (Morning, afternoon and evening)  Imdur 120 mg by mouth once daily    - If your blood pressure is less than 120 systolic, you may hold on taking your blood pressure medications  - Do not take you blood pressure medication before your dialysis session. Please take it after    It was a pleasure taking care of you!    Best,   Care Team

## 2024-04-11 ENCOUNTER — DOCUMENTATION (OUTPATIENT)
Dept: BEHAVIORAL HEALTH | Facility: CLINIC | Age: 54
End: 2024-04-11
Payer: COMMERCIAL

## 2024-04-11 ENCOUNTER — PHARMACY VISIT (OUTPATIENT)
Dept: PHARMACY | Facility: CLINIC | Age: 54
End: 2024-04-11
Payer: MEDICARE

## 2024-04-11 ENCOUNTER — APPOINTMENT (OUTPATIENT)
Dept: DIALYSIS | Facility: HOSPITAL | Age: 54
End: 2024-04-11
Payer: COMMERCIAL

## 2024-04-11 LAB
ALBUMIN SERPL BCP-MCNC: 3.2 G/DL (ref 3.4–5)
ANION GAP SERPL CALC-SCNC: 17 MMOL/L (ref 10–20)
BASOPHILS # BLD AUTO: 0.03 X10*3/UL (ref 0–0.1)
BASOPHILS NFR BLD AUTO: 0.5 %
BUN SERPL-MCNC: 61 MG/DL (ref 6–23)
CALCIUM SERPL-MCNC: 9.3 MG/DL (ref 8.6–10.6)
CHLORIDE SERPL-SCNC: 100 MMOL/L (ref 98–107)
CO2 SERPL-SCNC: 26 MMOL/L (ref 21–32)
CREAT SERPL-MCNC: 8.19 MG/DL (ref 0.5–1.05)
EGFRCR SERPLBLD CKD-EPI 2021: 5 ML/MIN/1.73M*2
EOSINOPHIL # BLD AUTO: 0.42 X10*3/UL (ref 0–0.7)
EOSINOPHIL NFR BLD AUTO: 7.5 %
ERYTHROCYTE [DISTWIDTH] IN BLOOD BY AUTOMATED COUNT: 15.9 % (ref 11.5–14.5)
GLUCOSE BLD MANUAL STRIP-MCNC: 152 MG/DL (ref 74–99)
GLUCOSE SERPL-MCNC: 122 MG/DL (ref 74–99)
HCT VFR BLD AUTO: 32.1 % (ref 36–46)
HGB BLD-MCNC: 9.5 G/DL (ref 12–16)
IMM GRANULOCYTES # BLD AUTO: 0.02 X10*3/UL (ref 0–0.7)
IMM GRANULOCYTES NFR BLD AUTO: 0.4 % (ref 0–0.9)
LYMPHOCYTES # BLD AUTO: 0.68 X10*3/UL (ref 1.2–4.8)
LYMPHOCYTES NFR BLD AUTO: 12.1 %
MAGNESIUM SERPL-MCNC: 2.19 MG/DL (ref 1.6–2.4)
MCH RBC QN AUTO: 28.1 PG (ref 26–34)
MCHC RBC AUTO-ENTMCNC: 29.6 G/DL (ref 32–36)
MCV RBC AUTO: 95 FL (ref 80–100)
MONOCYTES # BLD AUTO: 0.62 X10*3/UL (ref 0.1–1)
MONOCYTES NFR BLD AUTO: 11.1 %
NEUTROPHILS # BLD AUTO: 3.83 X10*3/UL (ref 1.2–7.7)
NEUTROPHILS NFR BLD AUTO: 68.4 %
NRBC BLD-RTO: 0 /100 WBCS (ref 0–0)
PHOSPHATE SERPL-MCNC: 4.8 MG/DL (ref 2.5–4.9)
PLATELET # BLD AUTO: 201 X10*3/UL (ref 150–450)
POTASSIUM SERPL-SCNC: 5.1 MMOL/L (ref 3.5–5.3)
RBC # BLD AUTO: 3.38 X10*6/UL (ref 4–5.2)
SODIUM SERPL-SCNC: 138 MMOL/L (ref 136–145)
WBC # BLD AUTO: 5.6 X10*3/UL (ref 4.4–11.3)

## 2024-04-11 PROCEDURE — 80069 RENAL FUNCTION PANEL: CPT

## 2024-04-11 PROCEDURE — 2500000002 HC RX 250 W HCPCS SELF ADMINISTERED DRUGS (ALT 637 FOR MEDICARE OP, ALT 636 FOR OP/ED): Mod: MUE

## 2024-04-11 PROCEDURE — 1100000001 HC PRIVATE ROOM DAILY

## 2024-04-11 PROCEDURE — 2500000002 HC RX 250 W HCPCS SELF ADMINISTERED DRUGS (ALT 637 FOR MEDICARE OP, ALT 636 FOR OP/ED)

## 2024-04-11 PROCEDURE — 90935 HEMODIALYSIS ONE EVALUATION: CPT | Performed by: INTERNAL MEDICINE

## 2024-04-11 PROCEDURE — 85025 COMPLETE CBC W/AUTO DIFF WBC: CPT

## 2024-04-11 PROCEDURE — 8010000001 HC DIALYSIS - HEMODIALYSIS PER DAY

## 2024-04-11 PROCEDURE — 2500000001 HC RX 250 WO HCPCS SELF ADMINISTERED DRUGS (ALT 637 FOR MEDICARE OP)

## 2024-04-11 PROCEDURE — 2500000004 HC RX 250 GENERAL PHARMACY W/ HCPCS (ALT 636 FOR OP/ED)

## 2024-04-11 PROCEDURE — 82947 ASSAY GLUCOSE BLOOD QUANT: CPT

## 2024-04-11 PROCEDURE — 83735 ASSAY OF MAGNESIUM: CPT

## 2024-04-11 PROCEDURE — 99232 SBSQ HOSP IP/OBS MODERATE 35: CPT

## 2024-04-11 RX ADMIN — HYDRALAZINE HYDROCHLORIDE 100 MG: 10 TABLET ORAL at 23:26

## 2024-04-11 RX ADMIN — CLONIDINE HYDROCHLORIDE 0.2 MG: 0.1 TABLET ORAL at 13:58

## 2024-04-11 RX ADMIN — Medication 1 TABLET: at 11:38

## 2024-04-11 RX ADMIN — HEPARIN SODIUM 5000 UNITS: 5000 INJECTION INTRAVENOUS; SUBCUTANEOUS at 21:06

## 2024-04-11 RX ADMIN — ALBUTEROL SULFATE 1.25 MG: 2.5 SOLUTION RESPIRATORY (INHALATION) at 01:59

## 2024-04-11 RX ADMIN — VALSARTAN 160 MG: 160 TABLET, FILM COATED ORAL at 11:37

## 2024-04-11 RX ADMIN — ASPIRIN 81 MG: 81 TABLET, COATED ORAL at 11:38

## 2024-04-11 RX ADMIN — SUMATRIPTAN SUCCINATE 50 MG: 50 TABLET ORAL at 15:57

## 2024-04-11 RX ADMIN — SUCRALFATE 1 G: 1 TABLET ORAL at 15:57

## 2024-04-11 RX ADMIN — DOCUSATE SODIUM 100 MG: 100 CAPSULE, LIQUID FILLED ORAL at 11:38

## 2024-04-11 RX ADMIN — HEPARIN SODIUM 5000 UNITS: 5000 INJECTION INTRAVENOUS; SUBCUTANEOUS at 13:58

## 2024-04-11 RX ADMIN — PANTOPRAZOLE SODIUM 40 MG: 40 TABLET, DELAYED RELEASE ORAL at 11:38

## 2024-04-11 RX ADMIN — ACETAMINOPHEN 975 MG: 325 TABLET ORAL at 13:58

## 2024-04-11 RX ADMIN — HEPARIN SODIUM 5000 UNITS: 5000 INJECTION INTRAVENOUS; SUBCUTANEOUS at 05:20

## 2024-04-11 RX ADMIN — SUCRALFATE 1 G: 1 TABLET ORAL at 11:38

## 2024-04-11 RX ADMIN — HYDRALAZINE HYDROCHLORIDE 100 MG: 10 TABLET ORAL at 15:58

## 2024-04-11 RX ADMIN — ACETAMINOPHEN 975 MG: 325 TABLET ORAL at 21:14

## 2024-04-11 RX ADMIN — SUMATRIPTAN SUCCINATE 50 MG: 50 TABLET ORAL at 05:19

## 2024-04-11 RX ADMIN — CLONIDINE HYDROCHLORIDE 0.2 MG: 0.1 TABLET ORAL at 21:06

## 2024-04-11 RX ADMIN — VALSARTAN 160 MG: 160 TABLET, FILM COATED ORAL at 21:07

## 2024-04-11 RX ADMIN — NIFEDIPINE 90 MG: 90 TABLET, FILM COATED, EXTENDED RELEASE ORAL at 11:38

## 2024-04-11 RX ADMIN — CARVEDILOL 50 MG: 25 TABLET, FILM COATED ORAL at 11:37

## 2024-04-11 RX ADMIN — HYDRALAZINE HYDROCHLORIDE 100 MG: 10 TABLET ORAL at 11:38

## 2024-04-11 RX ADMIN — ISOSORBIDE MONONITRATE 120 MG: 60 TABLET, EXTENDED RELEASE ORAL at 11:37

## 2024-04-11 RX ADMIN — ATORVASTATIN CALCIUM 80 MG: 80 TABLET, FILM COATED ORAL at 21:06

## 2024-04-11 RX ADMIN — FLUTICASONE PROPIONATE 2 SPRAY: 50 SPRAY, METERED NASAL at 09:00

## 2024-04-11 RX ADMIN — CARVEDILOL 50 MG: 25 TABLET, FILM COATED ORAL at 21:06

## 2024-04-11 ASSESSMENT — COGNITIVE AND FUNCTIONAL STATUS - GENERAL
DAILY ACTIVITIY SCORE: 24
MOBILITY SCORE: 22
WALKING IN HOSPITAL ROOM: A LITTLE
CLIMB 3 TO 5 STEPS WITH RAILING: A LITTLE

## 2024-04-11 ASSESSMENT — PAIN SCALES - PAIN ASSESSMENT IN ADVANCED DEMENTIA (PAINAD)
FACIALEXPRESSION: SMILING OR INEXPRESSIVE
BODYLANGUAGE: RELAXED
CONSOLABILITY: NO NEED TO CONSOLE
BODYLANGUAGE: RELAXED
TOTALSCORE: 0
BODYLANGUAGE: NORMAL
BREATHING: NORMAL
TOTALSCORE: 0
CONSOLABILITY: NO NEED TO CONSOLE
FACIALEXPRESSION: SMILING OR INEXPRESSIVE

## 2024-04-11 ASSESSMENT — PAIN - FUNCTIONAL ASSESSMENT
PAIN_FUNCTIONAL_ASSESSMENT: 0-10
PAIN_FUNCTIONAL_ASSESSMENT: NO/DENIES PAIN

## 2024-04-11 ASSESSMENT — PAIN SCALES - GENERAL
PAINLEVEL_OUTOF10: 0 - NO PAIN
PAINLEVEL_OUTOF10: 5 - MODERATE PAIN

## 2024-04-11 ASSESSMENT — PAIN DESCRIPTION - ORIENTATION: ORIENTATION: LEFT

## 2024-04-11 ASSESSMENT — PAIN SCALES - WONG BAKER: WONGBAKER_NUMERICALRESPONSE: HURTS LITTLE BIT

## 2024-04-11 ASSESSMENT — PAIN DESCRIPTION - LOCATION: LOCATION: ARM

## 2024-04-11 NOTE — CARE PLAN
The clinical goals for the shift include patient will be hemodynamically stable this shift- progressing.   Over the shift, patient had worsening SOB and states she feels fluid in her lungs. Vitals were stable. Administered PRN albuterol nebulizer to manage SOB. Patient lung sounds were diminished. Absent of crackles. Dr. Blanca Good aware. No new interventions at this time.     Problem: Skin  Goal: Decreased wound size/increased tissue granulation at next dressing change  Outcome: Progressing     Problem: Pain  Goal: Takes deep breaths with improved pain control throughout the shift  Outcome: Progressing     Problem: Pain - Adult  Goal: Verbalizes/displays adequate comfort level or baseline comfort level  Outcome: Progressing     Problem: Safety - Adult  Goal: Free from fall injury  Outcome: Progressing     Problem: Discharge Planning  Goal: Discharge to home or other facility with appropriate resources  Outcome: Progressing     Problem: Chronic Conditions and Co-morbidities  Goal: Patient's chronic conditions and co-morbidity symptoms are monitored and maintained or improved  Outcome: Progressing     Problem: Nutrition  Goal: Less than 5 days NPO/clear liquids  Outcome: Progressing

## 2024-04-11 NOTE — NURSING NOTE
Report to Receiving RN:    Report To: NGOZI Zambrano  Time Report Called: 10:50  Hand-Off Communication: No acute change from RN to RN report.  Patient tolerated treatment well with 2L fluid removed.  Last /78, HR 83.  No complaint of pain or discomfort.    Complications During Treatment: No  Ultrafiltration Treatment: No  Medications Administered During Dialysis: No  Blood Products Administered During Dialysis: No  Labs Sent During Dialysis: No  Heparin Drip Rate Changes: No  Dialysis Catheter Dressing: N/A   Last Dressing Change: N/A    Electronic Signatures:   (Signed 4/11/24)   Authored: Gorge Gaitan RN    Last Updated: 10:51 AM by GORGE GAITAN

## 2024-04-11 NOTE — PROGRESS NOTES
Provider met with patient [place] [facility] [place] Provider talked with patient about how she is feeling. Patient reports that her BP has been going up and down all morning and that she is concerned she might have to stay another night in the hospital. Patient also stated that she would need to follow up with endocrinologist once she is discharged. During visit- patient was informed she would be staying another night in the hospital.

## 2024-04-11 NOTE — PROGRESS NOTES
"Stacey Heath is a 53 y.o. female on day 8 of admission presenting with Shortness of breath.    Subjective   No acute overnight events. Patient at dialysis when I went to see her this morning. She is getting another session of dialysis tomorrow.    Objective     Physical Exam    Constitutional:       Appearance: Normal appearance.   HENT:      Head: Normocephalic and atraumatic.      Mouth/Throat:      Mouth: Mucous membranes are moist.   Eyes:      Extraocular Movements: Extraocular movements intact.      Pupils: Pupils are equal, round, and reactive to light.   Cardiovascular:      Rate and Rhythm: Normal rate and regular rhythm.   Pulmonary:      Effort: Pulmonary effort is normal.      Breath sounds: Normal breath sounds.   Abdominal:      General: Abdomen is flat.      Palpations: Abdomen is soft.   Musculoskeletal:         General: Normal range of motion.      Cervical back: Normal range of motion and neck supple.   Neurological:      General: No focal deficit present.      Mental Status: She is alert and oriented to person, place, and time.   Psychiatric:         Mood and Affect: Mood mickey    Last Recorded Vitals  Blood pressure 170/75, pulse 85, temperature 36.4 °C (97.5 °F), resp. rate 16, height 1.397 m (4' 7\"), weight 58.6 kg (129 lb 3.2 oz), SpO2 97%.  Intake/Output last 3 Shifts:  I/O last 3 completed shifts:  In: 340 (6.1 mL/kg) [P.O.:340]  Out: - (0 mL/kg)   Dosing Weight: 55.7 kg     Relevant Results  aspirin, 81 mg, oral, Daily  atorvastatin, 80 mg, oral, Nightly  carvedilol, 50 mg, oral, BID  cloNIDine, 0.2 mg, oral, q8h BELEN  docusate sodium, 100 mg, oral, BID  epoetin joceline or biosimilar, 6,000 Units, intravenous, Once per day on Tuesday Thursday Saturday  fluticasone, 2 spray, Each Nostril, Daily  fluticasone furoate-vilanteroL, 1 puff, inhalation, Daily  heparin (porcine), 5,000 Units, subcutaneous, [facility]h [facility]  hydrALAZINE, 100 mg, oral, TID  isosorbide mononitrate ER, 120 mg, oral, " "Daily  multivitamin with minerals, 1 tablet, oral, Daily  NIFEdipine ER, 90 mg, oral, Daily before breakfast  pantoprazole, 40 mg, oral, Daily  perflutren lipid microspheres, 0.5-10 mL of dilution, intravenous, Once in imaging  perflutren protein A microsphere, 0.5 mL, intravenous, Once in imaging  sucralfate, 1 g, oral, BID AC  sulfur hexafluoride microsphr, 2 mL, intravenous, Once in imaging  valsartan, 160 mg, oral, BID      Results from last 7 days   Lab Units 04/11/24  0529   WBC AUTO x10*3/uL 5.6   HEMOGLOBIN g/dL 9.5*   HEMATOCRIT % 32.1*   PLATELETS AUTO x10*3/uL 201     Results from last 7 days   Lab Units 04/11/24  0530   SODIUM mmol/L 138   POTASSIUM mmol/L 5.1   CHLORIDE mmol/L 100   CO2 mmol/L 26   BUN mg/dL 61*   CREATININE mg/dL 8.19*   EGFR mL/min/1.73m*2 5*   GLUCOSE mg/dL 122*   CALCIUM mg/dL 9.3   PHOSPHORUS mg/dL 4.8           No lab exists for component: \"LABALBU\"    Results from last 7 days   Lab Units 04/07/24  0414   APTT seconds 46*   INR  1.0       Assessment/Plan   Principal Problem:    Shortness of breath  Active Problems:    ESRD (end stage renal disease) on dialysis (CMS/[facility])    Ms. Stacey Heath is a 53 year old female with ESRD 2/2 HTN and T2DM and poorly controlled hypertension presenting with SOB in setting of hypertensive emergency despite stated medication and dialysis adherence. Admitted to CICU for management of hypertensive emergency. Restarted on PO meds as below. Transferred to the floor 4/8.      UPDATES 4/11  - Patient received hemodialysis today, scheduled for another session tomorrow. Her BP seems to go up right after dialysis, recorded to be 170/75 s/p dialysis  - Flu/Covid negative  - Continuing current BP regimen, vitals stable    #Hypertensive emergency  #Pulmonary edema  #HFpEF  -Presented with /99, max at 265/88  - Running in the 140s-160s currently   -nitroglycerin gtt and esmolo gtt weaned off, currently on nicardipine 10  - Clonidine 0.2mg Tid  - " [id number] bid  - Coreg to 50 BID  - Hydral 100 TID  - Imdur 120 daily   - Nifedipine 90 daily   - STOPPED minoxidil 5   - continue home atorvastatin 80mg   -Has follow up with endocrine for evaluation for secondary causes of hypertension      #AHRF 2/2 volume overload/moderate pulmonary edema  -HD as scheuled, nephro on board   -goal O2 sat >94%, titrate oxygen as needed     #COPD?   -following with pulm fellow clinic at , no PFTs yet to confirm COPD diagnosis  -planned for outpatient CT chest, PFTs, and sleep study  -continue home albuterol prn and breo ellipta     #History of Covid-19  -diagnosed on last admission 2/28/24  -no need for isolation     #ESRD on dialysis T/Th/Sat via RUE AVF, makes 1 cup urine daily  #history of kidney stones  #Hypervolemia  -Nephro on board  -Dialysis per schedule   -dry weight reported as 52kg  -renally dose meds, avoid nephrotoxic medications. Patient did receive contrast for CTA aorta   -Renal appointment 4/4/24 missed while inpatient had been scheduled with Dr. Barraza, will need to  be rescheduled      -Chronic diarrhea, described as coming for a few weeks post prandial happens only after she eats something and she has 4-5 Bms that are watery, no BM as long as she is not eating and it resolves on its own   -Currently resolved      #T2DM c/b retinopathy, nephropathy and neuropathy  -no medications  -Glucose 70s-140s     #Anemia of chronic disease I/s/o ESRD  -on Epo outpatient   -daily cbc     #Nausea  -C/w home metoclopramide      F: PRN  E: prn, ESRD  N: renal diet  A: L radial arterial line 4/3/24, RUE AVF  DVT ppx: SQH  GI ppx: home PPI    Code Status: Full code (confirmed on admission)  Surrogate Medical Decision-maker:      Daughter, Diya Jang ([id number]) - [phone]  and Rohini Piña [phone]   Duke Hewitt MD

## 2024-04-11 NOTE — PROGRESS NOTES
Renal Staff HD Note    Patient seen, examined on dialysis   Tolerating treatment without event  O2 per NC  100/48 58.6 (55kg DW) AVF     Continue treatment per submitted orders   2L  Could consider extra IUF per week x 2 to get to DW as outpatient (patient agreeable)    Change k bath to 2k

## 2024-04-11 NOTE — DOCUMENTATION CLARIFICATION NOTE
"    PATIENT:               EMILEE JACKSON  ACCT #:                  [id number]  MRN:                       [id number]  :                       1970  ADMIT DATE:       4/3/2024 11:10 AM  DISCH DATE:  RESPONDING PROVIDER #:        [id number]          PROVIDER RESPONSE TEXT:    Acute on Chronic Diastolic Congestive Heart Failure    CDI QUERY TEXT:    Clarification        Instruction:    Based on your assessment of the patient and the clinical information, please provide the requested documentation by clicking on the appropriate radio button and enter any additional information if prompted.    Question: Please further clarify the acuity of congestive heart failure      When answering this query, please exercise your independent professional judgment. The fact that a question is being asked, does not imply that any particular answer is desired or expected.    The patient's clinical indicators include:  Clinical Information: per PN-CICU : \"53 year old female with ESRD 2/2 HTN and T2DM and poorly controlled hypertension presenting with SOB in setting of hypertensive emergency despite stated medication and dialysis adherence. Admitted to CICU for management of hypertensive emergency in nitroglycerin gtt.\"    Clinical Indicators: BNP 4/3: 2101  VS on admit 4/3 1108: 36.9-100-18 238/99 SpO2 91 RA    per PN-CICU : \"Hypertensive emergency  Pulmonary edema  -Presented with /99, max at 265/88\"  \"HFpEF  :Echo 2023 with EF 55-60, repeat done 4/3/24  - CONCLUSIONS:  1. Left ventricular systolic function is normal with a 60-65% estimated ejection fraction.  2. Moderately increased left ventricular septal thickness.  3. Spectral Doppler shows a pseudonormal pattern of left ventricular diastolic filling.  4. The left ventricular posterior wall thickness is moderately increased.  5. There is severe concentric left ventricular hypertrophy.  6. The left atrium is mild to moderately dilated.\"  \"AHRF 2/2 volume " "overload/moderate pulmonary edema\"    per PN-CICU 4/8: \"Hypervolemia  -Nephro on board  -Dialysis per schedule\"    copied from CXR 4/3: \"IMPRESSION:  1. Enlarged cardiomediastinal silhouette and moderate pulmonary  edema, intervally worsened compared to prior exam.  2. Bilateral small pleural effusions.\"    Treatment: BNP, CXR, Nephrology consult, Echo, monitor IO, weight, nitro and esmolol drips, 3L supplemental O2    Risk Factors: HFpEF, ESRD, HTN Emergency  Options provided:  -- Acute on Chronic Diastolic Congestive Heart Failure  -- Chronic Diastolic Congestive Heart Failure  -- Other - I will add my own diagnosis  -- Refer to Clinical Documentation Reviewer    Query created by: Lo Brown on 4/10/2024 2:44 PM      Electronically signed by:  DUSTY AC MD 4/11/2024 2:48 PM          "

## 2024-04-11 NOTE — PROGRESS NOTES
Occupational Therapy                 Therapy Communication Note    Patient Name: Stacey Heath  MRN: [id number]  Today's Date: 4/11/2024     Discipline: Occupational Therapy    Missed Visit Reason: Missed Visit Reason: Patient in a medical procedure    Missed Time: Attempt    Comment:

## 2024-04-11 NOTE — NURSING NOTE
Report from Sending RN:    Report From: PONCHO Shields  Recent Surgery of Procedure: No  Baseline Level of Consciousness (LOC): x4  Oxygen Use: Yes, 2L PRN  Type: nasal cannula  Diabetic: Yes  Last BP Med Given Day of Dialysis:   Last Pain Med Given:   Lab Tests to be Obtained with Dialysis: No   Blood Transfusion to be Given During Dialysis: No  Available IV Access: Yes  Medications to be Administered During Dialysis: No  Continuous IV Infusion Running: No  Restraints on Currently or in the Last 24 Hours: No  Hand-Off Communication:   Dialysis Catheter Dressing:   Last Dressing Change:

## 2024-04-12 ENCOUNTER — APPOINTMENT (OUTPATIENT)
Dept: VASCULAR MEDICINE | Facility: HOSPITAL | Age: 54
DRG: 304 | End: 2024-04-12
Payer: COMMERCIAL

## 2024-04-12 LAB
ALBUMIN SERPL BCP-MCNC: 3 G/DL (ref 3.4–5)
ANION GAP SERPL CALC-SCNC: 18 MMOL/L (ref 10–20)
BASOPHILS # BLD AUTO: 0.06 X10*3/UL (ref 0–0.1)
BASOPHILS NFR BLD AUTO: 0.9 %
BUN SERPL-MCNC: 33 MG/DL (ref 6–23)
CALCIUM SERPL-MCNC: 9 MG/DL (ref 8.6–10.6)
CHLORIDE SERPL-SCNC: 97 MMOL/L (ref 98–107)
CO2 SERPL-SCNC: 26 MMOL/L (ref 21–32)
CREAT SERPL-MCNC: 5.95 MG/DL (ref 0.5–1.05)
EGFRCR SERPLBLD CKD-EPI 2021: 8 ML/MIN/1.73M*2
EOSINOPHIL # BLD AUTO: 0.5 X10*3/UL (ref 0–0.7)
EOSINOPHIL NFR BLD AUTO: 7.7 %
ERYTHROCYTE [DISTWIDTH] IN BLOOD BY AUTOMATED COUNT: 15.5 % (ref 11.5–14.5)
GLUCOSE SERPL-MCNC: 205 MG/DL (ref 74–99)
HCT VFR BLD AUTO: 32.3 % (ref 36–46)
HGB BLD-MCNC: 9.5 G/DL (ref 12–16)
IMM GRANULOCYTES # BLD AUTO: 0.03 X10*3/UL (ref 0–0.7)
IMM GRANULOCYTES NFR BLD AUTO: 0.5 % (ref 0–0.9)
LYMPHOCYTES # BLD AUTO: 0.97 X10*3/UL (ref 1.2–4.8)
LYMPHOCYTES NFR BLD AUTO: 14.9 %
MAGNESIUM SERPL-MCNC: 2.13 MG/DL (ref 1.6–2.4)
MCH RBC QN AUTO: 28.4 PG (ref 26–34)
MCHC RBC AUTO-ENTMCNC: 29.4 G/DL (ref 32–36)
MCV RBC AUTO: 96 FL (ref 80–100)
MONOCYTES # BLD AUTO: 0.68 X10*3/UL (ref 0.1–1)
MONOCYTES NFR BLD AUTO: 10.5 %
NEUTROPHILS # BLD AUTO: 4.25 X10*3/UL (ref 1.2–7.7)
NEUTROPHILS NFR BLD AUTO: 65.5 %
NRBC BLD-RTO: 0 /100 WBCS (ref 0–0)
PHOSPHATE SERPL-MCNC: 4.3 MG/DL (ref 2.5–4.9)
PLATELET # BLD AUTO: 224 X10*3/UL (ref 150–450)
POTASSIUM SERPL-SCNC: 4.6 MMOL/L (ref 3.5–5.3)
RBC # BLD AUTO: 3.35 X10*6/UL (ref 4–5.2)
SODIUM SERPL-SCNC: 136 MMOL/L (ref 136–145)
WBC # BLD AUTO: 6.5 X10*3/UL (ref 4.4–11.3)

## 2024-04-12 PROCEDURE — 1100000001 HC PRIVATE ROOM DAILY

## 2024-04-12 PROCEDURE — 83735 ASSAY OF MAGNESIUM: CPT

## 2024-04-12 PROCEDURE — 2500000001 HC RX 250 WO HCPCS SELF ADMINISTERED DRUGS (ALT 637 FOR MEDICARE OP)

## 2024-04-12 PROCEDURE — 2500000001 HC RX 250 WO HCPCS SELF ADMINISTERED DRUGS (ALT 637 FOR MEDICARE OP): Performed by: INTERNAL MEDICINE

## 2024-04-12 PROCEDURE — 93971 EXTREMITY STUDY: CPT

## 2024-04-12 PROCEDURE — 80069 RENAL FUNCTION PANEL: CPT

## 2024-04-12 PROCEDURE — 2500000002 HC RX 250 W HCPCS SELF ADMINISTERED DRUGS (ALT 637 FOR MEDICARE OP, ALT 636 FOR OP/ED): Mod: MUE

## 2024-04-12 PROCEDURE — 36415 COLL VENOUS BLD VENIPUNCTURE: CPT

## 2024-04-12 PROCEDURE — 2500000002 HC RX 250 W HCPCS SELF ADMINISTERED DRUGS (ALT 637 FOR MEDICARE OP, ALT 636 FOR OP/ED)

## 2024-04-12 PROCEDURE — 2500000004 HC RX 250 GENERAL PHARMACY W/ HCPCS (ALT 636 FOR OP/ED)

## 2024-04-12 PROCEDURE — 99233 SBSQ HOSP IP/OBS HIGH 50: CPT | Performed by: NURSE PRACTITIONER

## 2024-04-12 PROCEDURE — 85025 COMPLETE CBC W/AUTO DIFF WBC: CPT

## 2024-04-12 PROCEDURE — 2500000005 HC RX 250 GENERAL PHARMACY W/O HCPCS

## 2024-04-12 PROCEDURE — 93971 EXTREMITY STUDY: CPT | Performed by: INTERNAL MEDICINE

## 2024-04-12 PROCEDURE — 99232 SBSQ HOSP IP/OBS MODERATE 35: CPT

## 2024-04-12 RX ORDER — TRAMADOL HYDROCHLORIDE 50 MG/1
50 TABLET ORAL EVERY 12 HOURS PRN
Status: DISCONTINUED | OUTPATIENT
Start: 2024-04-12 | End: 2024-04-14 | Stop reason: HOSPADM

## 2024-04-12 RX ORDER — LIDOCAINE 560 MG/1
1 PATCH PERCUTANEOUS; TOPICAL; TRANSDERMAL DAILY
Qty: 7 PATCH | Refills: 0 | OUTPATIENT
Start: 2024-04-13 | End: 2024-04-20

## 2024-04-12 RX ORDER — LIDOCAINE 560 MG/1
1 PATCH PERCUTANEOUS; TOPICAL; TRANSDERMAL DAILY
Status: DISCONTINUED | OUTPATIENT
Start: 2024-04-12 | End: 2024-04-12

## 2024-04-12 RX ORDER — LIDOCAINE 560 MG/1
1 PATCH PERCUTANEOUS; TOPICAL; TRANSDERMAL DAILY
Status: DISCONTINUED | OUTPATIENT
Start: 2024-04-12 | End: 2024-04-14

## 2024-04-12 RX ORDER — LIDOCAINE 560 MG/1
1 PATCH PERCUTANEOUS; TOPICAL; TRANSDERMAL DAILY
Status: DISCONTINUED | OUTPATIENT
Start: 2024-04-13 | End: 2024-04-14

## 2024-04-12 RX ADMIN — SUCRALFATE 1 G: 1 TABLET ORAL at 06:14

## 2024-04-12 RX ADMIN — ATORVASTATIN CALCIUM 80 MG: 80 TABLET, FILM COATED ORAL at 21:01

## 2024-04-12 RX ADMIN — Medication 1 TABLET: at 09:32

## 2024-04-12 RX ADMIN — HEPARIN SODIUM 5000 UNITS: 5000 INJECTION INTRAVENOUS; SUBCUTANEOUS at 13:54

## 2024-04-12 RX ADMIN — HYDRALAZINE HYDROCHLORIDE 100 MG: 10 TABLET ORAL at 21:00

## 2024-04-12 RX ADMIN — HEPARIN SODIUM 5000 UNITS: 5000 INJECTION INTRAVENOUS; SUBCUTANEOUS at 06:14

## 2024-04-12 RX ADMIN — LIDOCAINE 1 PATCH: 4 PATCH TOPICAL at 02:22

## 2024-04-12 RX ADMIN — PANTOPRAZOLE SODIUM 40 MG: 40 TABLET, DELAYED RELEASE ORAL at 09:32

## 2024-04-12 RX ADMIN — ACETAMINOPHEN 975 MG: 325 TABLET ORAL at 13:28

## 2024-04-12 RX ADMIN — VALSARTAN 160 MG: 160 TABLET, FILM COATED ORAL at 09:32

## 2024-04-12 RX ADMIN — NIFEDIPINE 90 MG: 90 TABLET, FILM COATED, EXTENDED RELEASE ORAL at 06:14

## 2024-04-12 RX ADMIN — TRAMADOL HYDROCHLORIDE 50 MG: 50 TABLET, COATED ORAL at 19:12

## 2024-04-12 RX ADMIN — ACETAMINOPHEN 975 MG: 325 TABLET ORAL at 21:01

## 2024-04-12 RX ADMIN — LIDOCAINE 1 PATCH: 4 PATCH TOPICAL at 22:19

## 2024-04-12 RX ADMIN — VALSARTAN 160 MG: 160 TABLET, FILM COATED ORAL at 21:00

## 2024-04-12 RX ADMIN — ISOSORBIDE MONONITRATE 120 MG: 60 TABLET, EXTENDED RELEASE ORAL at 09:32

## 2024-04-12 RX ADMIN — CARVEDILOL 50 MG: 25 TABLET, FILM COATED ORAL at 21:00

## 2024-04-12 RX ADMIN — HYDRALAZINE HYDROCHLORIDE 100 MG: 10 TABLET ORAL at 13:54

## 2024-04-12 RX ADMIN — CARVEDILOL 50 MG: 25 TABLET, FILM COATED ORAL at 09:32

## 2024-04-12 RX ADMIN — CLONIDINE HYDROCHLORIDE 0.2 MG: 0.1 TABLET ORAL at 06:13

## 2024-04-12 RX ADMIN — CLONIDINE HYDROCHLORIDE 0.2 MG: 0.1 TABLET ORAL at 21:53

## 2024-04-12 RX ADMIN — CLONIDINE HYDROCHLORIDE 0.2 MG: 0.1 TABLET ORAL at 13:54

## 2024-04-12 RX ADMIN — APIXABAN 5 MG: 5 TABLET, FILM COATED ORAL at 21:00

## 2024-04-12 RX ADMIN — HYDRALAZINE HYDROCHLORIDE 100 MG: 10 TABLET ORAL at 09:32

## 2024-04-12 RX ADMIN — ASPIRIN 81 MG: 81 TABLET, COATED ORAL at 09:32

## 2024-04-12 ASSESSMENT — COGNITIVE AND FUNCTIONAL STATUS - GENERAL
WALKING IN HOSPITAL ROOM: A LITTLE
MOBILITY SCORE: 22
WALKING IN HOSPITAL ROOM: A LITTLE
MOBILITY SCORE: 22
CLIMB 3 TO 5 STEPS WITH RAILING: A LITTLE
CLIMB 3 TO 5 STEPS WITH RAILING: A LITTLE
DAILY ACTIVITIY SCORE: 24
DAILY ACTIVITIY SCORE: 24

## 2024-04-12 ASSESSMENT — PAIN - FUNCTIONAL ASSESSMENT
PAIN_FUNCTIONAL_ASSESSMENT: 0-10

## 2024-04-12 ASSESSMENT — PAIN DESCRIPTION - LOCATION
LOCATION: ARM
LOCATION: ARM

## 2024-04-12 ASSESSMENT — PAIN SCALES - GENERAL
PAINLEVEL_OUTOF10: 7
PAINLEVEL_OUTOF10: 8
PAINLEVEL_OUTOF10: 7
PAINLEVEL_OUTOF10: 6
PAINLEVEL_OUTOF10: 8
PAINLEVEL_OUTOF10: 5 - MODERATE PAIN
PAINLEVEL_OUTOF10: 5 - MODERATE PAIN
PAINLEVEL_OUTOF10: 0 - NO PAIN
PAINLEVEL_OUTOF10: 3

## 2024-04-12 ASSESSMENT — PAIN DESCRIPTION - DESCRIPTORS: DESCRIPTORS: ACHING

## 2024-04-12 ASSESSMENT — PAIN SCALES - PAIN ASSESSMENT IN ADVANCED DEMENTIA (PAINAD)
CONSOLABILITY: NO NEED TO CONSOLE
TOTALSCORE: 0
BODYLANGUAGE: RELAXED
BREATHING: NORMAL
FACIALEXPRESSION: SMILING OR INEXPRESSIVE

## 2024-04-12 ASSESSMENT — PAIN SCALES - WONG BAKER: WONGBAKER_NUMERICALRESPONSE: HURTS LITTLE MORE

## 2024-04-12 ASSESSMENT — PAIN DESCRIPTION - ORIENTATION
ORIENTATION: LEFT
ORIENTATION: LEFT

## 2024-04-12 NOTE — PROGRESS NOTES
4/12/2024 Care coordination  Stacey Heath is a 53 y.o. female on day 9 of admission presenting with Shortness of breath. Pt states she lives with S.O.  and support of daughters,  HD at [facility], T-Th-Sat.  Uses a walker and a cane at home . PT/OT recs [facility].  Team to place order.

## 2024-04-12 NOTE — PROGRESS NOTES
"Stacey Heath is a 53 y.o. female on day 9 of admission presenting with Shortness of breath.    Subjective   No acute overnight events. Patient doing well this morning but complains of pain in left arm at removed PICC site. No other complaints    Objective     Physical Exam    Constitutional:       Appearance: Normal appearance.   HENT:      Head: Normocephalic and atraumatic.      Mouth/Throat:      Mouth: Mucous membranes are moist.   Eyes:      Extraocular Movements: Extraocular movements intact.      Pupils: Pupils are equal, round, and reactive to light.   Cardiovascular:      Rate and Rhythm: Normal rate and regular rhythm.   Pulmonary:      Effort: Pulmonary effort is normal.      Breath sounds: Normal breath sounds.   Abdominal:      General: Abdomen is flat.      Palpations: Abdomen is soft.   Musculoskeletal:         General: Normal range of motion.      Cervical back: Normal range of motion and neck supple.   Neurological:      General: No focal deficit present.      Mental Status: She is alert and oriented to person, place, and time.   Psychiatric:         Mood and Affect: Mood mickey    Last Recorded Vitals  Blood pressure 139/60, pulse 76, temperature 36.3 °C (97.3 °F), temperature source Temporal, resp. rate 18, height 1.397 m (4' 7\"), weight 56.7 kg (125 lb), SpO2 97%.  Intake/Output last 3 Shifts:  I/O last 3 completed shifts:  In: 1040 (18.7 mL/kg) [P.O.:240; I.V.:400 (7.2 mL/kg); Other:400]  Out: 2400 (43.1 mL/kg) [Other:2400]  Dosing Weight: 55.7 kg     Relevant Results  apixaban, 5 mg, oral, BID  aspirin, 81 mg, oral, Daily  atorvastatin, 80 mg, oral, Nightly  carvedilol, 50 mg, oral, BID  cloNIDine, 0.2 mg, oral, q8h BELEN  docusate sodium, 100 mg, oral, BID  epoetin joceline or biosimilar, 6,000 Units, intravenous, Once per day on Tuesday Thursday Saturday  fluticasone, 2 spray, Each Nostril, Daily  fluticasone furoate-vilanteroL, 1 puff, inhalation, Daily  hydrALAZINE, 100 mg, oral, TID  isosorbide " "mononitrate ER, 120 mg, oral, Daily  lidocaine, 1 patch, transdermal, Daily  multivitamin with minerals, 1 tablet, oral, Daily  NIFEdipine ER, 90 mg, oral, Daily before breakfast  pantoprazole, 40 mg, oral, Daily  perflutren lipid microspheres, 0.5-10 mL of dilution, intravenous, Once in imaging  perflutren protein A microsphere, 0.5 mL, intravenous, Once in imaging  sucralfate, 1 g, oral, BID AC  sulfur hexafluoride microsphr, 2 mL, intravenous, Once in imaging  valsartan, 160 mg, oral, BID      Results from last 7 days   Lab Units 04/12/24  0825   WBC AUTO x10*3/uL 6.5   HEMOGLOBIN g/dL 9.5*   HEMATOCRIT % 32.3*   PLATELETS AUTO x10*3/uL 224     Results from last 7 days   Lab Units 04/12/24  0825   SODIUM mmol/L 136   POTASSIUM mmol/L 4.6   CHLORIDE mmol/L 97*   CO2 mmol/L 26   BUN mg/dL 33*   CREATININE mg/dL 5.95*   EGFR mL/min/1.73m*2 8*   GLUCOSE mg/dL 205*   CALCIUM mg/dL 9.0   PHOSPHORUS mg/dL 4.3           No lab exists for component: \"LABALBU\"    Results from last 7 days   Lab Units 04/07/24  0414   APTT seconds 46*   INR  1.0       Assessment/Plan   Principal Problem:    Shortness of breath  Active Problems:    ESRD (end stage renal disease) on dialysis (Multi)    Ms. Stacey Heath is a 53 year old female with ESRD 2/2 HTN and T2DM and poorly controlled hypertension presenting with SOB in setting of hypertensive emergency despite stated medication and dialysis adherence. Admitted to CICU for management of hypertensive emergency. Restarted on PO meds as below. Transferred to the floor 4/8.      UPDATES 4/12  - Left upper extremity ultrasound shows Acute DVT of left brachial vein  - Started Eliquis 5 mg  - Discharge delayed, continuing rest of treatment    #Hypertensive emergency  #Pulmonary edema  #HFpEF  -Presented with /99, max at 265/88  - Running in the 140s-160s currently   -nitroglycerin gtt and esmolo gtt weaned off, currently on nicardipine 10  - Clonidine 0.2mg Tid  - [id number] bid  - " Coreg to 50 BID  - Hydral 100 TID  - Imdur 120 daily   - Nifedipine 90 daily   - STOPPED minoxidil 5   - continue home atorvastatin 80mg   -Has follow up with endocrine for evaluation for secondary causes of hypertension      #AHRF 2/2 volume overload/moderate pulmonary edema  -HD as scheuled, nephro on board   -goal O2 sat >94%, titrate oxygen as needed     #COPD?   -following with pulm fellow clinic at , no PFTs yet to confirm COPD diagnosis  -planned for outpatient CT chest, PFTs, and sleep study  -continue home albuterol prn and breo ellipta     #History of Covid-19  -diagnosed on last admission 2/28/24  -no need for isolation     #ESRD on dialysis T/Th/Sat via RUE AVF, makes 1 cup urine daily  #history of kidney stones  #Hypervolemia  -Nephro on board  -Dialysis per schedule   -dry weight reported as 52kg  -renally dose meds, avoid nephrotoxic medications. Patient did receive contrast for CTA aorta   -Renal appointment 4/4/24 missed while inpatient had been scheduled with Dr. Barraza, will need to  be rescheduled      -Chronic diarrhea, described as coming for a few weeks post prandial happens only after she eats something and she has 4-5 Bms that are watery, no BM as long as she is not eating and it resolves on its own   -Currently resolved      #T2DM c/b retinopathy, nephropathy and neuropathy  -no medications  -Glucose 70s-140s     #Anemia of chronic disease I/s/o ESRD  -on Epo outpatient   -daily cbc     #Nausea  -C/w home metoclopramide     #Acute DVT left brachial veing  - Started eliquis 5 mg     F: PRN  E: prn, ESRD  N: renal diet  A: L radial arterial line 4/3/24, RUE AVF  DVT ppx: SQH  GI ppx: home PPI    Code Status: Full code (confirmed on admission)  Surrogate Medical Decision-maker:      Daughter, Diya Jang [phone]  and Rohini Piña [phone]   Duke Hewitt MD

## 2024-04-12 NOTE — PROGRESS NOTES
"Stacey Heath is a 53 y.o. female on day 9 of admission presenting with Shortness of breath.    Subjective   Pt resting in bed. Denies sob, n/v,diarrhea, watery stools, fever, cough, chills, chest pains, headache, has pain to lt arm where midline was removed       Objective     Physical Exam  Vitals and nursing note reviewed.   Constitutional:       Appearance: She is ill-appearing.   Cardiovascular:      Rate and Rhythm: Normal rate and regular rhythm.      Comments: SR 86  Pulmonary:      Comments: Mckinley lung sounds dim to bases  Also states she uses inhalers which are effective  Abdominal:      General: There is distension.      Comments: Dist-non tender to palpation   Genitourinary:     Comments: States make urine  Musculoskeletal:      Comments: Ble without edema  Lt upper arm edematous-s/p midline removed   Skin:     General: Skin is warm and dry.   Neurological:      Mental Status: She is oriented to person, place, and time.   Psychiatric:         Mood and Affect: Mood normal.         Behavior: Behavior normal.         Last Recorded Vitals  Blood pressure 177/73, pulse 89, temperature 36.6 °C (97.9 °F), resp. rate 18, height 1.397 m (4' 7\"), weight 56.7 kg (125 lb), SpO2 97%.  Intake/Output last 3 Shifts:  I/O last 3 completed shifts:  In: 1040 (18.7 mL/kg) [P.O.:240; I.V.:400 (7.2 mL/kg); Other:400]  Out: 2400 (43.1 mL/kg) [Other:2400]  Dosing Weight: 55.7 kg     Relevant Results          Scheduled medications  aspirin, 81 mg, oral, Daily  atorvastatin, 80 mg, oral, Nightly  carvedilol, 50 mg, oral, BID  cloNIDine, 0.2 mg, oral, q8h BELEN  docusate sodium, 100 mg, oral, BID  epoetin joceline or biosimilar, 6,000 Units, intravenous, Once per day on Tuesday Thursday Saturday  fluticasone, 2 spray, Each Nostril, Daily  fluticasone furoate-vilanteroL, 1 puff, inhalation, Daily  heparin (porcine), 5,000 Units, subcutaneous, q8h BELEN  hydrALAZINE, 100 mg, oral, TID  isosorbide mononitrate ER, 120 mg, oral, " Daily  lidocaine, 1 patch, transdermal, Daily  multivitamin with minerals, 1 tablet, oral, Daily  NIFEdipine ER, 90 mg, oral, Daily before breakfast  pantoprazole, 40 mg, oral, Daily  perflutren lipid microspheres, 0.5-10 mL of dilution, intravenous, Once in imaging  perflutren protein A microsphere, 0.5 mL, intravenous, Once in imaging  sucralfate, 1 g, oral, BID AC  sulfur hexafluoride microsphr, 2 mL, intravenous, Once in imaging  valsartan, 160 mg, oral, BID      Continuous medications     PRN medications  PRN medications: acetaminophen, albuterol, diphenhydrAMINE, hydrOXYzine HCL, metoclopramide, oxygen, prochlorperazine **OR** prochlorperazine **OR** prochlorperazine, sodium chloride, SUMAtriptan         Results for orders placed or performed during the hospital encounter of 04/03/24 (from the past 24 hour(s))   POCT GLUCOSE   Result Value Ref Range    POCT Glucose 152 (H) 74 - 99 mg/dL   Renal Function Panel   Result Value Ref Range    Glucose 205 (H) 74 - 99 mg/dL    Sodium 136 136 - 145 mmol/L    Potassium 4.6 3.5 - 5.3 mmol/L    Chloride 97 (L) 98 - 107 mmol/L    Bicarbonate 26 21 - 32 mmol/L    Anion Gap 18 10 - 20 mmol/L    Urea Nitrogen 33 (H) 6 - 23 mg/dL    Creatinine 5.95 (H) 0.50 - 1.05 mg/dL    eGFR 8 (L) >60 mL/min/1.73m*2    Calcium 9.0 8.6 - 10.6 mg/dL    Phosphorus 4.3 2.5 - 4.9 mg/dL    Albumin 3.0 (L) 3.4 - 5.0 g/dL   Magnesium   Result Value Ref Range    Magnesium 2.13 1.60 - 2.40 mg/dL   CBC and Auto Differential   Result Value Ref Range    WBC 6.5 4.4 - 11.3 x10*3/uL    nRBC 0.0 0.0 - 0.0 /100 WBCs    RBC 3.35 (L) 4.00 - 5.20 x10*6/uL    Hemoglobin 9.5 (L) 12.0 - 16.0 g/dL    Hematocrit 32.3 (L) 36.0 - 46.0 %    MCV 96 80 - 100 fL    MCH 28.4 26.0 - 34.0 pg    MCHC 29.4 (L) 32.0 - 36.0 g/dL    RDW 15.5 (H) 11.5 - 14.5 %    Platelets 224 150 - 450 x10*3/uL    Neutrophils % 65.5 40.0 - 80.0 %    Immature Granulocytes %, Automated 0.5 0.0 - 0.9 %    Lymphocytes % 14.9 13.0 - 44.0 %     Monocytes % 10.5 2.0 - 10.0 %    Eosinophils % 7.7 0.0 - 6.0 %    Basophils % 0.9 0.0 - 2.0 %    Neutrophils Absolute 4.25 1.20 - 7.70 x10*3/uL    Immature Granulocytes Absolute, Automated 0.03 0.00 - 0.70 x10*3/uL    Lymphocytes Absolute 0.97 (L) 1.20 - 4.80 x10*3/uL    Monocytes Absolute 0.68 0.10 - 1.00 x10*3/uL    Eosinophils Absolute 0.50 0.00 - 0.70 x10*3/uL    Basophils Absolute 0.06 0.00 - 0.10 x10*3/uL        Assessment/Plan   Principal Problem:    Shortness of breath  Active Problems:    ESRD (end stage renal disease) on dialysis (Multi)    Tolerated hemodialysis yesterday with net fluid loss 2000cc    Bp elevated with tx (100//77), hypervolemic on exam and has stable electrolytes . K+=4.6     Outpatient Dialysis schedule:   TTS @ [facility]/Dr. Garcia     Access: rt fist with +thrill/bruit- no issues - able to achieve   4/8--Lt arm midline.. discontinue 4/12     Anemia of ESRD: 4/10-epogen 6,000 units on dialysis days.. current hgb 9.5... will cont to monitor       CKD-MBD Phosphate Binder:multivitamin with minerals 1 tablet daily     Plan HD tomorrow with UF as tolerated     Renal diet      Please obtain daily standing wt (if possible)     Medication to be adjusted for ESRD      Patient to continue regular HD schedule while inpatient and to follow with the outpatient nephrologist at discharge        LILLY Rebollar-CNP

## 2024-04-13 ENCOUNTER — APPOINTMENT (OUTPATIENT)
Dept: DIALYSIS | Facility: HOSPITAL | Age: 54
End: 2024-04-13
Payer: COMMERCIAL

## 2024-04-13 ENCOUNTER — APPOINTMENT (OUTPATIENT)
Dept: RADIOLOGY | Facility: HOSPITAL | Age: 54
DRG: 304 | End: 2024-04-13
Payer: COMMERCIAL

## 2024-04-13 LAB
ALBUMIN SERPL BCP-MCNC: 3 G/DL (ref 3.4–5)
ANION GAP SERPL CALC-SCNC: 17 MMOL/L (ref 10–20)
BASOPHILS # BLD AUTO: 0.03 X10*3/UL (ref 0–0.1)
BASOPHILS NFR BLD AUTO: 0.5 %
BUN SERPL-MCNC: 50 MG/DL (ref 6–23)
CALCIUM SERPL-MCNC: 9.1 MG/DL (ref 8.6–10.6)
CARDIAC TROPONIN I PNL SERPL HS: 31 NG/L (ref 0–34)
CHLORIDE SERPL-SCNC: 98 MMOL/L (ref 98–107)
CO2 SERPL-SCNC: 26 MMOL/L (ref 21–32)
CREAT SERPL-MCNC: 7.88 MG/DL (ref 0.5–1.05)
EGFRCR SERPLBLD CKD-EPI 2021: 6 ML/MIN/1.73M*2
EOSINOPHIL # BLD AUTO: 0.45 X10*3/UL (ref 0–0.7)
EOSINOPHIL NFR BLD AUTO: 7.8 %
ERYTHROCYTE [DISTWIDTH] IN BLOOD BY AUTOMATED COUNT: 15.4 % (ref 11.5–14.5)
GLUCOSE SERPL-MCNC: 140 MG/DL (ref 74–99)
HCT VFR BLD AUTO: 28.5 % (ref 36–46)
HGB BLD-MCNC: 9 G/DL (ref 12–16)
IMM GRANULOCYTES # BLD AUTO: 0.02 X10*3/UL (ref 0–0.7)
IMM GRANULOCYTES NFR BLD AUTO: 0.3 % (ref 0–0.9)
LYMPHOCYTES # BLD AUTO: 0.81 X10*3/UL (ref 1.2–4.8)
LYMPHOCYTES NFR BLD AUTO: 14.1 %
MAGNESIUM SERPL-MCNC: 2.16 MG/DL (ref 1.6–2.4)
MCH RBC QN AUTO: 29 PG (ref 26–34)
MCHC RBC AUTO-ENTMCNC: 31.6 G/DL (ref 32–36)
MCV RBC AUTO: 92 FL (ref 80–100)
MONOCYTES # BLD AUTO: 0.64 X10*3/UL (ref 0.1–1)
MONOCYTES NFR BLD AUTO: 11.1 %
NEUTROPHILS # BLD AUTO: 3.81 X10*3/UL (ref 1.2–7.7)
NEUTROPHILS NFR BLD AUTO: 66.2 %
NRBC BLD-RTO: 0 /100 WBCS (ref 0–0)
PHOSPHATE SERPL-MCNC: 4.7 MG/DL (ref 2.5–4.9)
PLATELET # BLD AUTO: 234 X10*3/UL (ref 150–450)
POTASSIUM SERPL-SCNC: 4.9 MMOL/L (ref 3.5–5.3)
RBC # BLD AUTO: 3.1 X10*6/UL (ref 4–5.2)
SODIUM SERPL-SCNC: 136 MMOL/L (ref 136–145)
WBC # BLD AUTO: 5.8 X10*3/UL (ref 4.4–11.3)

## 2024-04-13 PROCEDURE — 2500000001 HC RX 250 WO HCPCS SELF ADMINISTERED DRUGS (ALT 637 FOR MEDICARE OP)

## 2024-04-13 PROCEDURE — 6340000001 HC RX 634 EPOETIN <10,000 UNITS: Mod: JZ | Performed by: NURSE PRACTITIONER

## 2024-04-13 PROCEDURE — 83735 ASSAY OF MAGNESIUM: CPT

## 2024-04-13 PROCEDURE — 36415 COLL VENOUS BLD VENIPUNCTURE: CPT | Performed by: INTERNAL MEDICINE

## 2024-04-13 PROCEDURE — 2500000001 HC RX 250 WO HCPCS SELF ADMINISTERED DRUGS (ALT 637 FOR MEDICARE OP): Performed by: INTERNAL MEDICINE

## 2024-04-13 PROCEDURE — 2500000002 HC RX 250 W HCPCS SELF ADMINISTERED DRUGS (ALT 637 FOR MEDICARE OP, ALT 636 FOR OP/ED)

## 2024-04-13 PROCEDURE — 94640 AIRWAY INHALATION TREATMENT: CPT

## 2024-04-13 PROCEDURE — 99232 SBSQ HOSP IP/OBS MODERATE 35: CPT

## 2024-04-13 PROCEDURE — 1100000001 HC PRIVATE ROOM DAILY

## 2024-04-13 PROCEDURE — 90935 HEMODIALYSIS ONE EVALUATION: CPT | Performed by: INTERNAL MEDICINE

## 2024-04-13 PROCEDURE — RXMED WILLOW AMBULATORY MEDICATION CHARGE

## 2024-04-13 PROCEDURE — 80069 RENAL FUNCTION PANEL: CPT

## 2024-04-13 PROCEDURE — 2500000002 HC RX 250 W HCPCS SELF ADMINISTERED DRUGS (ALT 637 FOR MEDICARE OP, ALT 636 FOR OP/ED): Mod: MUE

## 2024-04-13 PROCEDURE — 71045 X-RAY EXAM CHEST 1 VIEW: CPT

## 2024-04-13 PROCEDURE — 84484 ASSAY OF TROPONIN QUANT: CPT | Performed by: INTERNAL MEDICINE

## 2024-04-13 PROCEDURE — 71045 X-RAY EXAM CHEST 1 VIEW: CPT | Performed by: RADIOLOGY

## 2024-04-13 PROCEDURE — 85025 COMPLETE CBC W/AUTO DIFF WBC: CPT

## 2024-04-13 PROCEDURE — 8010000001 HC DIALYSIS - HEMODIALYSIS PER DAY

## 2024-04-13 PROCEDURE — 2500000005 HC RX 250 GENERAL PHARMACY W/O HCPCS

## 2024-04-13 PROCEDURE — 36415 COLL VENOUS BLD VENIPUNCTURE: CPT

## 2024-04-13 RX ORDER — NITROGLYCERIN 0.4 MG/1
0.4 TABLET SUBLINGUAL EVERY 5 MIN PRN
Status: DISCONTINUED | OUTPATIENT
Start: 2024-04-13 | End: 2024-04-14 | Stop reason: HOSPADM

## 2024-04-13 RX ADMIN — VALSARTAN 160 MG: 160 TABLET, FILM COATED ORAL at 21:24

## 2024-04-13 RX ADMIN — HYDRALAZINE HYDROCHLORIDE 100 MG: 10 TABLET ORAL at 23:18

## 2024-04-13 RX ADMIN — NITROGLYCERIN 0.4 MG: 0.4 TABLET SUBLINGUAL at 15:55

## 2024-04-13 RX ADMIN — SUCRALFATE 1 G: 1 TABLET ORAL at 15:55

## 2024-04-13 RX ADMIN — DOCUSATE SODIUM 100 MG: 100 CAPSULE, LIQUID FILLED ORAL at 11:28

## 2024-04-13 RX ADMIN — CLONIDINE HYDROCHLORIDE 0.2 MG: 0.1 TABLET ORAL at 13:25

## 2024-04-13 RX ADMIN — NIFEDIPINE 90 MG: 90 TABLET, FILM COATED, EXTENDED RELEASE ORAL at 11:27

## 2024-04-13 RX ADMIN — HYDRALAZINE HYDROCHLORIDE 100 MG: 10 TABLET ORAL at 11:28

## 2024-04-13 RX ADMIN — ISOSORBIDE MONONITRATE 120 MG: 60 TABLET, EXTENDED RELEASE ORAL at 11:28

## 2024-04-13 RX ADMIN — APIXABAN 5 MG: 5 TABLET, FILM COATED ORAL at 21:24

## 2024-04-13 RX ADMIN — ACETAMINOPHEN 975 MG: 325 TABLET ORAL at 15:23

## 2024-04-13 RX ADMIN — CARVEDILOL 50 MG: 25 TABLET, FILM COATED ORAL at 21:24

## 2024-04-13 RX ADMIN — TRAMADOL HYDROCHLORIDE 50 MG: 50 TABLET, COATED ORAL at 23:17

## 2024-04-13 RX ADMIN — ACETAMINOPHEN 975 MG: 325 TABLET ORAL at 03:47

## 2024-04-13 RX ADMIN — ACETAMINOPHEN 975 MG: 325 TABLET ORAL at 19:33

## 2024-04-13 RX ADMIN — METOCLOPRAMIDE 5 MG: 10 TABLET ORAL at 19:32

## 2024-04-13 RX ADMIN — HYDRALAZINE HYDROCHLORIDE 100 MG: 10 TABLET ORAL at 18:21

## 2024-04-13 RX ADMIN — APIXABAN 5 MG: 5 TABLET, FILM COATED ORAL at 11:28

## 2024-04-13 RX ADMIN — CARVEDILOL 50 MG: 25 TABLET, FILM COATED ORAL at 11:28

## 2024-04-13 RX ADMIN — SUMATRIPTAN SUCCINATE 50 MG: 50 TABLET ORAL at 13:25

## 2024-04-13 RX ADMIN — TRAMADOL HYDROCHLORIDE 50 MG: 50 TABLET, COATED ORAL at 10:32

## 2024-04-13 RX ADMIN — CLONIDINE HYDROCHLORIDE 0.2 MG: 0.1 TABLET ORAL at 05:25

## 2024-04-13 RX ADMIN — FLUTICASONE FUROATE AND VILANTEROL TRIFENATATE 1 PUFF: 100; 25 POWDER RESPIRATORY (INHALATION) at 11:22

## 2024-04-13 RX ADMIN — PANTOPRAZOLE SODIUM 40 MG: 40 TABLET, DELAYED RELEASE ORAL at 11:27

## 2024-04-13 RX ADMIN — EPOETIN ALFA 6000 UNITS: 3000 SOLUTION INTRAVENOUS; SUBCUTANEOUS at 21:24

## 2024-04-13 RX ADMIN — ASPIRIN 81 MG: 81 TABLET, COATED ORAL at 11:28

## 2024-04-13 RX ADMIN — CLONIDINE HYDROCHLORIDE 0.2 MG: 0.1 TABLET ORAL at 21:24

## 2024-04-13 RX ADMIN — LIDOCAINE 1 PATCH: 4 PATCH TOPICAL at 11:33

## 2024-04-13 RX ADMIN — ATORVASTATIN CALCIUM 80 MG: 80 TABLET, FILM COATED ORAL at 21:24

## 2024-04-13 RX ADMIN — VALSARTAN 160 MG: 160 TABLET, FILM COATED ORAL at 11:27

## 2024-04-13 ASSESSMENT — PAIN SCALES - GENERAL
PAINLEVEL_OUTOF10: 6
PAINLEVEL_OUTOF10: 3
PAINLEVEL_OUTOF10: 6
PAINLEVEL_OUTOF10: 3
PAINLEVEL_OUTOF10: 2
PAINLEVEL_OUTOF10: 0 - NO PAIN
PAINLEVEL_OUTOF10: 0 - NO PAIN
PAINLEVEL_OUTOF10: 5 - MODERATE PAIN
PAINLEVEL_OUTOF10: 7
PAINLEVEL_OUTOF10: 5 - MODERATE PAIN

## 2024-04-13 ASSESSMENT — PAIN - FUNCTIONAL ASSESSMENT
PAIN_FUNCTIONAL_ASSESSMENT: 0-10
PAIN_FUNCTIONAL_ASSESSMENT: NO/DENIES PAIN

## 2024-04-13 ASSESSMENT — COGNITIVE AND FUNCTIONAL STATUS - GENERAL
CLIMB 3 TO 5 STEPS WITH RAILING: A LITTLE
DAILY ACTIVITIY SCORE: 24
MOBILITY SCORE: 24
WALKING IN HOSPITAL ROOM: A LITTLE
MOBILITY SCORE: 22
DAILY ACTIVITIY SCORE: 24

## 2024-04-13 NOTE — NURSING NOTE
Report to Receiving RN:    Report To: Roxana Rubio RN)  Time Report Called: 1057 am  Hand-Off Communication: vs- 189/89; HR 89; fluid removed 2.7 liters  Complications During Treatment: yes c/o generalized pain  Ultrafiltration Treatment: No  Medications Administered During Dialysis: Yes, Tramadol 50 mg 1032 am  Blood Products Administered During Dialysis: No  Labs Sent During Dialysis: No  Heparin Drip Rate Changes: No  Dialysis Catheter Dressin2024  Last Dressing Change: 2024    Electronic Signatures:   (Signed Cassidy Jenkins RN)   Authored:    (Signed )   Authored:     Last Updated: 10:56 AM by CASSIDY JENKINS

## 2024-04-13 NOTE — CARE PLAN
The patient's goals for the shift include Have BM on toilet    The clinical goals for the shift include pt will remain hemodynamically stable thrughout the shift    Over the shift, the patient did make progress toward the following goals.     Problem: Skin  Goal: Decreased wound size/increased tissue granulation at next dressing change  Outcome: Progressing  Goal: Participates in plan/prevention/treatment measures  Outcome: Progressing  Goal: Prevent/manage excess moisture  Outcome: Progressing  Goal: Prevent/minimize sheer/friction injuries  Outcome: Progressing  Goal: Promote/optimize nutrition  Outcome: Progressing  Goal: Promote skin healing  Outcome: Progressing     Problem: Pain  Goal: Takes deep breaths with improved pain control throughout the shift  Outcome: Progressing  Goal: Turns in bed with improved pain control throughout the shift  Outcome: Progressing  Goal: Walks with improved pain control throughout the shift  Outcome: Progressing  Goal: Performs ADL's with improved pain control throughout shift  Outcome: Progressing  Goal: Participates in PT with improved pain control throughout the shift  Outcome: Progressing  Goal: Free from opioid side effects throughout the shift  Outcome: Progressing  Goal: Free from acute confusion related to pain meds throughout the shift  Outcome: Progressing     Problem: Diabetes  Goal: Achieve decreasing blood glucose levels by end of shift  Outcome: Progressing  Goal: Increase stability of blood glucose readings by end of shift  Outcome: Progressing  Goal: Decrease in ketones present in urine by end of shift  Outcome: Progressing  Goal: Maintain electrolyte levels within acceptable range throughout shift  Outcome: Progressing  Goal: Maintain glucose levels >70mg/dl to <250mg/dl throughout shift  Outcome: Progressing  Goal: No changes in neurological exam by end of shift  Outcome: Progressing  Goal: Learn about and adhere to nutrition recommendations by end of  shift  Outcome: Progressing  Goal: Vital signs within normal range for age by end of shift  Outcome: Progressing  Goal: Increase self care and/or family involovement by end of shift  Outcome: Progressing  Goal: Receive DSME education by end of shift  Outcome: Progressing     Problem: Pain - Adult  Goal: Verbalizes/displays adequate comfort level or baseline comfort level  Outcome: Progressing     Problem: Safety - Adult  Goal: Free from fall injury  Outcome: Progressing     Problem: Discharge Planning  Goal: Discharge to home or other facility with appropriate resources  Outcome: Progressing     Problem: Chronic Conditions and Co-morbidities  Goal: Patient's chronic conditions and co-morbidity symptoms are monitored and maintained or improved  Outcome: Progressing     Problem: Nutrition  Goal: Less than 5 days NPO/clear liquids  Outcome: Progressing  Goal: Oral intake greater than 50%  Outcome: Progressing  Goal: Oral intake greater 75%  Outcome: Progressing  Goal: Consume prescribed supplement  Outcome: Progressing  Goal: Adequate PO fluid intake  Outcome: Progressing  Goal: Nutrition support goals are met within 48 hrs  Outcome: Progressing  Goal: Nutrition support is meeting 75% of nutrient needs  Outcome: Progressing  Goal: Tube feed tolerance  Outcome: Progressing  Goal: BG  mg/dL  Outcome: Progressing  Goal: Lab values WNL  Outcome: Progressing  Goal: Electrolytes WNL  Outcome: Progressing  Goal: Promote healing  Outcome: Progressing  Goal: Maintain stable weight  Outcome: Progressing  Goal: Reduce weight from edema/fluid  Outcome: Progressing  Goal: Gradual weight gain  Outcome: Progressing  Goal: Improve ostomy output  Outcome: Progressing

## 2024-04-13 NOTE — CARE PLAN
The patient's goals for the shift include have dialysis today  The clinical goals for the shift include pt will remain hemodynamically stable thrughout the shift    Over the shift, the patient returned from dialysis. BP elevated with CP present. Team aware , EKG done and am meds given. BP improved and CP resolved.  C/o left arm discomfort medicated . Pt resting at this time

## 2024-04-13 NOTE — PROGRESS NOTES
"Stacey Heath is a 53 y.o. female on day 10 of admission presenting with Shortness of breath.    Subjective   Patient was at dialysis when I saw her. She complained of central chest pain non-radiating, no sweating, dizziness, SOB, nausea/vomiting. She said pain is aggravated when coughing and talking. Nurse reports patient's daughter yelled at her because doctor's haven't seen her in a couple of days which was not true. Clarified it with patient and daughter. Afterwards she had complaints of chest pain.    Objective     Physical Exam    Constitutional:       Appearance: Normal appearance.   HENT:      Head: Normocephalic and atraumatic.      Mouth/Throat:      Mouth: Mucous membranes are moist.   Eyes:      Extraocular Movements: Extraocular movements intact.      Pupils: Pupils are equal, round, and reactive to light.   Cardiovascular:      Rate and Rhythm: Normal rate and regular rhythm.   Pulmonary:      Effort: Pulmonary effort is normal.      Breath sounds: Normal breath sounds.   Abdominal:      General: Abdomen is flat.      Palpations: Abdomen is soft.   Musculoskeletal:         General: Normal range of motion.      Cervical back: Normal range of motion and neck supple.   Neurological:      General: No focal deficit present.      Mental Status: She is alert and oriented to person, place, and time.   Psychiatric:         Mood and Affect: Mood mickey    Last Recorded Vitals  Blood pressure 160/76, pulse 100, temperature 36.4 °C (97.5 °F), resp. rate 18, height 1.397 m (4' 7\"), weight 57.3 kg (126 lb 5.2 oz), SpO2 98%.  Intake/Output last 3 Shifts:  I/O last 3 completed shifts:  In: 1220 (21.9 mL/kg) [P.O.:820; I.V.:400 (7.2 mL/kg)]  Out: 0 (0 mL/kg)   Dosing Weight: 55.7 kg     Relevant Results  apixaban, 5 mg, oral, BID  aspirin, 81 mg, oral, Daily  atorvastatin, 80 mg, oral, Nightly  carvedilol, 50 mg, oral, BID  cloNIDine, 0.2 mg, oral, q8h BELEN  docusate sodium, 100 mg, oral, BID  epoetin joceline or " "biosimilar, 6,000 Units, intravenous, Once per day on Tuesday Thursday Saturday  fluticasone, 2 spray, Each Nostril, Daily  fluticasone furoate-vilanteroL, 1 puff, inhalation, Daily  hydrALAZINE, 100 mg, oral, TID  isosorbide mononitrate ER, 120 mg, oral, Daily  lidocaine, 1 patch, transdermal, Daily  lidocaine, 1 patch, transdermal, Daily  multivitamin with minerals, 1 tablet, oral, Daily  NIFEdipine ER, 90 mg, oral, Daily before breakfast  pantoprazole, 40 mg, oral, Daily  perflutren lipid microspheres, 0.5-10 mL of dilution, intravenous, Once in imaging  perflutren protein A microsphere, 0.5 mL, intravenous, Once in imaging  sucralfate, 1 g, oral, BID AC  sulfur hexafluoride microsphr, 2 mL, intravenous, Once in imaging  valsartan, 160 mg, oral, BID      Results from last 7 days   Lab Units 04/13/24  0642   WBC AUTO x10*3/uL 5.8   HEMOGLOBIN g/dL 9.0*   HEMATOCRIT % 28.5*   PLATELETS AUTO x10*3/uL 234     Results from last 7 days   Lab Units 04/13/24  0642   SODIUM mmol/L 136   POTASSIUM mmol/L 4.9   CHLORIDE mmol/L 98   CO2 mmol/L 26   BUN mg/dL 50*   CREATININE mg/dL 7.88*   EGFR mL/min/1.73m*2 6*   GLUCOSE mg/dL 140*   CALCIUM mg/dL 9.1   PHOSPHORUS mg/dL 4.7           No lab exists for component: \"LABALBU\"    Results from last 7 days   Lab Units 04/07/24  0414   APTT seconds 46*   INR  1.0       Assessment/Plan   Principal Problem:    Shortness of breath  Active Problems:    ESRD (end stage renal disease) on dialysis (Multi)    Ms. Stacey Heath is a 53 year old female with ESRD 2/2 HTN and T2DM and poorly controlled hypertension presenting with SOB in setting of hypertensive emergency despite stated medication and dialysis adherence. Admitted to CICU for management of hypertensive emergency. Restarted on PO meds as below. Transferred to the floor 4/8.      UPDATES 4/13  - On eliquis 5 mg BID  - Complains of chest pain, repeat EKG shows no new changes, troponin 30 but patient has ESRD which can cause troponin " to be slightly elevated but it is still wnl  - Chest x-ray ordered, will follow up  - Patient says tomorrow will be better for her to go home    #Hypertensive emergency  #Pulmonary edema  #HFpEF  -Presented with /99, max at 265/88  - Running in the 140s-160s currently   -nitroglycerin gtt and esmolo gtt weaned off, currently on nicardipine 10  - Clonidine 0.2mg Tid  - [id number] bid  - Coreg to 50 BID  - Hydral 100 TID  - Imdur 120 daily   - Nifedipine 90 daily   - STOPPED minoxidil 5   - continue home atorvastatin 80mg   -Has follow up with endocrine for evaluation for secondary causes of hypertension      #AHRF 2/2 volume overload/moderate pulmonary edema  -HD as scheuled, nephro on board   -goal O2 sat >94%, titrate oxygen as needed     #COPD?   -following with pulm fellow clinic at , no PFTs yet to confirm COPD diagnosis  -planned for outpatient CT chest, PFTs, and sleep study  -continue home albuterol prn and breo ellipta     #History of Covid-19  -diagnosed on last admission 2/28/24  -no need for isolation     #ESRD on dialysis T/Th/Sat via RUE AVF, makes 1 cup urine daily  #history of kidney stones  #Hypervolemia  -Nephro on board  -Dialysis per schedule   -dry weight reported as 52kg  -renally dose meds, avoid nephrotoxic medications. Patient did receive contrast for CTA aorta   -Renal appointment 4/4/24 missed while inpatient had been scheduled with Dr. Barraza, will need to  be rescheduled      -Chronic diarrhea, described as coming for a few weeks post prandial happens only after she eats something and she has 4-5 Bms that are watery, no BM as long as she is not eating and it resolves on its own   -Currently resolved      #T2DM c/b retinopathy, nephropathy and neuropathy  -no medications  -Glucose 70s-140s     #Anemia of chronic disease I/s/o ESRD  -on Epo outpatient   -daily cbc     #Nausea  -C/w home metoclopramide     #Acute DVT left brachial veing  - Started eliquis 5 mg     F: PRN  E:  prn, ESRD  N: renal diet  A: L radial arterial line 4/3/24, RUE AVF  DVT ppx: SQH  GI ppx: home PPI    Code Status: Full code (confirmed on admission)  Surrogate Medical Decision-maker:      DaughterDiya [phone]  and Rohini Piña [phone]   Duke Hewitt MD

## 2024-04-13 NOTE — NURSING NOTE
Report from Sending RN:    Report From: Kassi ( PONCHO)  Recent Surgery of Procedure: No  Baseline Level of Consciousness (LOC): a/o x 4  Oxygen Use: No  Type: none  Diabetic: Yes  Last BP Med Given Day of Dialysis: tylenol 975 mg  Last Pain Med Given: clonidine 0.2 mg   Lab Tests to be Obtained with Dialysis: yes  Blood Transfusion to be Given During Dialysis: No  Available IV Access: Yes  Medications to be Administered During Dialysis: No  Continuous IV Infusion Running: No  Restraints on Currently or in the Last 24 Hours: No  Hand-Off Communication: No acute overnight morning events; vss; Pt did not take morning medications; Pt will need labs; Pt is a full code; Cassidy Gonzalez RN.  Dialysis Catheter Dressing: fistula   Last Dressing Change: fistula

## 2024-04-13 NOTE — PROGRESS NOTES
Nephrology Follow-up Note   Patient ID: Stacey Heath is a 53 y.o. female.   Admitted for :   Chief Complaint   Patient presents with    Shortness of Breath      Evaluation of patient on dialysis at [facility]  [address]  [facility] [id number] on 4/3/2024  Seen and examined during hemodialysis. Labs and events reviewed.      Subjective:   C/o left arm pain - severe not relieved by tylenol   No c/o related to HD     Patient Active Problem List   Diagnosis    Abscess of axillary region    Anxiety    Astigmatism    Carotid bruit    Chronic migraine with aura    Congenital cataract    Diabetes mellitus type 2, uncomplicated (Multi)    Inflammation of stomach and intestine    Coronary artery disease involving native coronary artery    Dialysis patient (CMS-Formerly [facility])    Nephropathy    Essential hypertension    Iron deficiency anemia    Low grade squamous intraepith lesion on cytologic smear cervix (lgsil)    Low grade squamous intraepithelial lesion on cytologic smear of cervix (LGSIL)    Dialysis AV fistula malfunction (CMS-Formerly [facility])    Migraine    Neuropathy    Nicotine use disorder    Other pericardial effusion (noninflammatory) ([facility])    Cough, unspecified    Dyspnea on exertion    Shoulder pain, bilateral    Sweating abnormality    Transplant    Acute pulmonary edema with heart disease (Multi)    Respiratory failure (Multi)    Poorly-controlled hypertension    Polyneuropathy due to type 2 diabetes mellitus (Multi)    Obesity, Class I, BMI 30-34.9    Nuclear senile cataract of both eyes    Normocytic normochromic anemia    Low back pain    Hidradenitis    Complex dyslipidemia    Chronic heart failure with preserved ejection fraction (HFpEF) (Multi)    ESRD (end stage renal disease) on dialysis (Multi)    Hypervolemia    ERRONEOUS ENCOUNTER--DISREGARD    Malnutrition of moderate degree (Multi)    Shortness of breath       Scheduled medications:  apixaban, 5 mg, oral, BID  aspirin, 81  mg, oral, Daily  atorvastatin, 80 mg, oral, Nightly  carvedilol, 50 mg, oral, BID  cloNIDine, 0.2 mg, oral, q8h BELEN  docusate sodium, 100 mg, oral, BID  epoetin joceline or biosimilar, 6,000 Units, intravenous, Once per day on Tuesday Thursday Saturday  fluticasone, 2 spray, Each Nostril, Daily  fluticasone furoate-vilanteroL, 1 puff, inhalation, Daily  hydrALAZINE, 100 mg, oral, TID  isosorbide mononitrate ER, 120 mg, oral, Daily  lidocaine, 1 patch, transdermal, Daily  lidocaine, 1 patch, transdermal, Daily  multivitamin with minerals, 1 tablet, oral, Daily  NIFEdipine ER, 90 mg, oral, Daily before breakfast  pantoprazole, 40 mg, oral, Daily  perflutren lipid microspheres, 0.5-10 mL of dilution, intravenous, Once in imaging  perflutren protein A microsphere, 0.5 mL, intravenous, Once in imaging  sucralfate, 1 g, oral, BID AC  sulfur hexafluoride microsphr, 2 mL, intravenous, Once in imaging  valsartan, 160 mg, oral, BID         PRN medications: acetaminophen, albuterol, diphenhydrAMINE, hydrOXYzine HCL, metoclopramide, oxygen, prochlorperazine **OR** prochlorperazine **OR** prochlorperazine, sodium chloride, SUMAtriptan, traMADol     Heart Rate:  [76-96]   Temp:  [35 °C (95 °F)-36.9 °C (98.4 °F)]   Resp:  [15-18]   BP: (139-194)/(60-96)   Weight:  [57.3 kg (126 lb 5.2 oz)]   SpO2:  [91 %-100 %]    Weight: 51.3 kg (113 lb)   Gen: alert, NAD  HEENT: NC/AT  Neck: supple, no JVD   Pulm: clear ant b/l   CVS: RRR, no rub  Abd: S/NT/ND  LE: no edema , no cyanosis   Neuro: no asterixis   Dialysis acces:  rtAVF     Lab Results   Component Value Date    WBC 5.8 04/13/2024    HGB 9.0 (L) 04/13/2024    HCT 28.5 (L) 04/13/2024    MCV 92 04/13/2024     04/13/2024     Lab Results   Component Value Date    GLUCOSE 140 (H) 04/13/2024    CALCIUM 9.1 04/13/2024     04/13/2024    K 4.9 04/13/2024    CO2 26 04/13/2024    CL 98 04/13/2024    BUN 50 (H) 04/13/2024    CREATININE 7.88 (H) 04/13/2024     Results from last 72 hours    Lab Units 04/13/24  0642   ALBUMIN g/dL 3.0*   GLUCOSE mg/dL 140*   HEMOGLOBIN g/dL 9.0*   WBC AUTO x10*3/uL 5.8      Results from last 72 hours   Lab Units 04/13/24  0642   SODIUM mmol/L 136   POTASSIUM mmol/L 4.9   CO2 mmol/L 26   BUN mg/dL 50*   CREATININE mg/dL 7.88*   PHOSPHORUS mg/dL 4.7   CALCIUM mg/dL 9.1        Assessment   ESRD-HD admitted with HTN urgency now with new acute DVT provoked started on apixaban 5 BID     Plan   Plan HD per submitted orders, UF 2L  as tolerated  MBD - Phosphorus binder: none currently , phos OK   Anemia of CKD: EPO to be given x 3 per week   Access: no issues   BP: acceptable during treatment   Renal Diet   Daily renal MVI   Continue 3 x per week hemodialysis; SW to ensure availability of o/p HD chair at discharge   Haydee Mosher MD MPH

## 2024-04-14 ENCOUNTER — DOCUMENTATION (OUTPATIENT)
Dept: HOME HEALTH SERVICES | Facility: HOME HEALTH | Age: 54
End: 2024-04-14
Payer: COMMERCIAL

## 2024-04-14 ENCOUNTER — PHARMACY VISIT (OUTPATIENT)
Dept: PHARMACY | Facility: CLINIC | Age: 54
End: 2024-04-14
Payer: MEDICARE

## 2024-04-14 ENCOUNTER — HOME HEALTH ADMISSION (OUTPATIENT)
Dept: HOME HEALTH SERVICES | Facility: HOME HEALTH | Age: 54
End: 2024-04-14
Payer: COMMERCIAL

## 2024-04-14 VITALS
OXYGEN SATURATION: 99 % | BODY MASS INDEX: 28.01 KG/M2 | WEIGHT: 121.03 LBS | HEART RATE: 76 BPM | RESPIRATION RATE: 16 BRPM | TEMPERATURE: 98.4 F | HEIGHT: 55 IN | DIASTOLIC BLOOD PRESSURE: 73 MMHG | SYSTOLIC BLOOD PRESSURE: 142 MMHG

## 2024-04-14 LAB
ALBUMIN SERPL BCP-MCNC: 3.6 G/DL (ref 3.4–5)
ANION GAP SERPL CALC-SCNC: 19 MMOL/L (ref 10–20)
BASOPHILS # BLD AUTO: 0.07 X10*3/UL (ref 0–0.1)
BASOPHILS NFR BLD AUTO: 1.3 %
BUN SERPL-MCNC: 32 MG/DL (ref 6–23)
CALCIUM SERPL-MCNC: 9.8 MG/DL (ref 8.6–10.6)
CHLORIDE SERPL-SCNC: 94 MMOL/L (ref 98–107)
CO2 SERPL-SCNC: 26 MMOL/L (ref 21–32)
CREAT SERPL-MCNC: 6.18 MG/DL (ref 0.5–1.05)
EGFRCR SERPLBLD CKD-EPI 2021: 8 ML/MIN/1.73M*2
EOSINOPHIL # BLD AUTO: 0.33 X10*3/UL (ref 0–0.7)
EOSINOPHIL NFR BLD AUTO: 6.3 %
ERYTHROCYTE [DISTWIDTH] IN BLOOD BY AUTOMATED COUNT: 15.1 % (ref 11.5–14.5)
GLUCOSE SERPL-MCNC: 104 MG/DL (ref 74–99)
HCT VFR BLD AUTO: 34.6 % (ref 36–46)
HGB BLD-MCNC: 10.2 G/DL (ref 12–16)
IMM GRANULOCYTES # BLD AUTO: 0.04 X10*3/UL (ref 0–0.7)
IMM GRANULOCYTES NFR BLD AUTO: 0.8 % (ref 0–0.9)
LYMPHOCYTES # BLD AUTO: 0.74 X10*3/UL (ref 1.2–4.8)
LYMPHOCYTES NFR BLD AUTO: 14.2 %
MAGNESIUM SERPL-MCNC: 2.37 MG/DL (ref 1.6–2.4)
MCH RBC QN AUTO: 27.4 PG (ref 26–34)
MCHC RBC AUTO-ENTMCNC: 29.5 G/DL (ref 32–36)
MCV RBC AUTO: 93 FL (ref 80–100)
MONOCYTES # BLD AUTO: 0.44 X10*3/UL (ref 0.1–1)
MONOCYTES NFR BLD AUTO: 8.5 %
NEUTROPHILS # BLD AUTO: 3.58 X10*3/UL (ref 1.2–7.7)
NEUTROPHILS NFR BLD AUTO: 68.9 %
NRBC BLD-RTO: 0 /100 WBCS (ref 0–0)
PHOSPHATE SERPL-MCNC: 5 MG/DL (ref 2.5–4.9)
PLATELET # BLD AUTO: 253 X10*3/UL (ref 150–450)
POTASSIUM SERPL-SCNC: 4.2 MMOL/L (ref 3.5–5.3)
RBC # BLD AUTO: 3.72 X10*6/UL (ref 4–5.2)
SODIUM SERPL-SCNC: 135 MMOL/L (ref 136–145)
WBC # BLD AUTO: 5.2 X10*3/UL (ref 4.4–11.3)

## 2024-04-14 PROCEDURE — 2500000001 HC RX 250 WO HCPCS SELF ADMINISTERED DRUGS (ALT 637 FOR MEDICARE OP): Performed by: INTERNAL MEDICINE

## 2024-04-14 PROCEDURE — 2500000002 HC RX 250 W HCPCS SELF ADMINISTERED DRUGS (ALT 637 FOR MEDICARE OP, ALT 636 FOR OP/ED): Mod: MUE

## 2024-04-14 PROCEDURE — 83735 ASSAY OF MAGNESIUM: CPT

## 2024-04-14 PROCEDURE — 2500000002 HC RX 250 W HCPCS SELF ADMINISTERED DRUGS (ALT 637 FOR MEDICARE OP, ALT 636 FOR OP/ED)

## 2024-04-14 PROCEDURE — 36415 COLL VENOUS BLD VENIPUNCTURE: CPT

## 2024-04-14 PROCEDURE — 2500000001 HC RX 250 WO HCPCS SELF ADMINISTERED DRUGS (ALT 637 FOR MEDICARE OP)

## 2024-04-14 PROCEDURE — RXMED WILLOW AMBULATORY MEDICATION CHARGE

## 2024-04-14 PROCEDURE — 80069 RENAL FUNCTION PANEL: CPT

## 2024-04-14 PROCEDURE — 2500000005 HC RX 250 GENERAL PHARMACY W/O HCPCS

## 2024-04-14 PROCEDURE — 99239 HOSP IP/OBS DSCHRG MGMT >30: CPT | Performed by: INTERNAL MEDICINE

## 2024-04-14 PROCEDURE — 94640 AIRWAY INHALATION TREATMENT: CPT

## 2024-04-14 PROCEDURE — 85025 COMPLETE CBC W/AUTO DIFF WBC: CPT

## 2024-04-14 RX ORDER — LIDOCAINE 560 MG/1
1 PATCH PERCUTANEOUS; TOPICAL; TRANSDERMAL DAILY
Status: DISCONTINUED | OUTPATIENT
Start: 2024-04-14 | End: 2024-04-14 | Stop reason: HOSPADM

## 2024-04-14 RX ORDER — LIDOCAINE 560 MG/1
1 PATCH PERCUTANEOUS; TOPICAL; TRANSDERMAL DAILY
Qty: 30 PATCH | Refills: 0 | Status: SHIPPED | OUTPATIENT
Start: 2024-04-14 | End: 2024-05-14

## 2024-04-14 RX ADMIN — LIDOCAINE 1 PATCH: 4 PATCH TOPICAL at 08:54

## 2024-04-14 RX ADMIN — PANTOPRAZOLE SODIUM 40 MG: 40 TABLET, DELAYED RELEASE ORAL at 08:54

## 2024-04-14 RX ADMIN — CLONIDINE HYDROCHLORIDE 0.2 MG: 0.1 TABLET ORAL at 05:45

## 2024-04-14 RX ADMIN — APIXABAN 5 MG: 5 TABLET, FILM COATED ORAL at 08:55

## 2024-04-14 RX ADMIN — METOCLOPRAMIDE 5 MG: 10 TABLET ORAL at 10:31

## 2024-04-14 RX ADMIN — ASPIRIN 81 MG: 81 TABLET, COATED ORAL at 08:55

## 2024-04-14 RX ADMIN — VALSARTAN 160 MG: 160 TABLET, FILM COATED ORAL at 08:54

## 2024-04-14 RX ADMIN — ISOSORBIDE MONONITRATE 120 MG: 60 TABLET, EXTENDED RELEASE ORAL at 08:55

## 2024-04-14 RX ADMIN — Medication 1 TABLET: at 09:00

## 2024-04-14 RX ADMIN — ACETAMINOPHEN 975 MG: 325 TABLET ORAL at 04:42

## 2024-04-14 RX ADMIN — CARVEDILOL 50 MG: 25 TABLET, FILM COATED ORAL at 08:54

## 2024-04-14 RX ADMIN — FLUTICASONE FUROATE AND VILANTEROL TRIFENATATE 1 PUFF: 100; 25 POWDER RESPIRATORY (INHALATION) at 11:07

## 2024-04-14 RX ADMIN — NIFEDIPINE 90 MG: 90 TABLET, FILM COATED, EXTENDED RELEASE ORAL at 07:20

## 2024-04-14 RX ADMIN — HYDRALAZINE HYDROCHLORIDE 100 MG: 10 TABLET ORAL at 08:54

## 2024-04-14 RX ADMIN — SALINE NASAL SPRAY 1 SPRAY: 1.5 SOLUTION NASAL at 05:45

## 2024-04-14 RX ADMIN — SUCRALFATE 1 G: 1 TABLET ORAL at 07:18

## 2024-04-14 RX ADMIN — SUMATRIPTAN SUCCINATE 50 MG: 50 TABLET ORAL at 07:17

## 2024-04-14 ASSESSMENT — PAIN SCALES - GENERAL
PAINLEVEL_OUTOF10: 5 - MODERATE PAIN
PAINLEVEL_OUTOF10: 6
PAINLEVEL_OUTOF10: 0 - NO PAIN

## 2024-04-14 ASSESSMENT — COGNITIVE AND FUNCTIONAL STATUS - GENERAL
DAILY ACTIVITIY SCORE: 24
MOBILITY SCORE: 24

## 2024-04-14 ASSESSMENT — PAIN - FUNCTIONAL ASSESSMENT
PAIN_FUNCTIONAL_ASSESSMENT: 0-10
PAIN_FUNCTIONAL_ASSESSMENT: 0-10

## 2024-04-14 NOTE — DISCHARGE SUMMARY
Discharge Diagnosis  Shortness of breath    Issues Requiring Follow-Up  Secondary causes of hypertension  Chronic pleural effusions  Brachial DVT on Apixaban        Test Results Pending At Discharge  Pending Labs       No current pending labs.            Hospital Course  53 y.o. female with PMH ESRD 2/2 HTN and diabetes on dialysis T/Th/Sat (last complete session 4/2/24) and poorly controlled hypertension, DMT2, and COPD (baseline 1-2L NC) who presented to the ED 4/3/24 with SOB on waking and a headache. BP found to be in 230's/90's, CXR with pulmonary edena, labs notable for Cr 6.12, BNP 2016, troponin 193--> 116. Does not wear oxygen at home but notes that she often requires oxygen when she is fluid overloaded (1-2L). Currently being evaluated for kidney transplant and was noted to be significantly SOB at recent appointment 3/29/24, planned for outpatient CT chest, PFTs and sleep study. CT angio aorta and b/l ileofemoral to rule out aortic dissection. Admitted to CICU for hypertensive Emergency.      On arrival to CICU, patient awake and in no distress. Complains of feeling fluid overloaded by facial puffiness and 7/10 frontal headache that does not feel like her typical migraine headache. During her stay she was initially maintained on nitroglycerin gtt and then had to add esmolol gtt. She eventually needed 3 drips to with adding cardene gtt. CTA angio was done initially for concern of aortic dissection but was unremarkable for an aortic pathology.      During her stay in the ICU:  Hydralazine from 50 TID to 100 TID  Carvedilol from 25 BID to 50 BID  Clonidine from 0.1 BID to 0.2 TID  Amlodipine Dced and started on nifedipine 90  Imdur from 30 daily to 120 daily   Valsartan kept at 160 BID   Started on minoxidil 5 daily (Stopped on transfer to floor)    She was transferred to the floor on 4/8, she was hemodynamically stable and received her scheduled hemodialysis. Minoxidil was stopped. All other medications were  continued.  Patient arrived to nursing floor with PICC in place. She began to complain of UE pain and it was removed.ultrasound was done which showed acute brachial vein DVT. She was started eliquis 5 mg BID. Patient stable for discharge home with home care and follow up with PCP, Endocrine and nephrology    Pertinent Physical Exam At Time of Discharge  Physical Exam  General: chronic ill appearing in no apparent distress  HEENT: anicteric sclera, PERRL  CV: S1/S2 no MGR  PULM: CTAB, no wheezing rhonchi or rales  ABD: soft non tender, non distended  EXT: no peripheral edema  NEURO: A&O x 4    Home Medications     Medication List      START taking these medications     apixaban 2.5 mg tablet; Commonly known as: Eliquis; Take 2 tablets (5   mg) by mouth 2 times a day.   Lidocaine Pain Relief 4 % patch; Generic drug: lidocaine; Place 1 patch   over 12 hours on the skin once daily. Remove & discard patch within 12   hours or as directed by MD.   NIFEdipine ER 90 mg 24 hr tablet; Commonly known as: Adalat CC; Take 1   tablet (90 mg) by mouth once daily in the morning. Take before meals. Do   not crush, chew, or split.     CHANGE how you take these medications     carvedilol 25 mg tablet; Commonly known as: Coreg; Take 2 tablets (50   mg) by mouth 2 times a day.; What changed: how much to take   cloNIDine 0.2 mg tablet; Commonly known as: Catapres; Take 1 tablet (0.2   mg) by mouth every 8 hours.; What changed: medication strength, how much   to take, when to take this   hydrALAZINE 100 mg tablet; Commonly known as: Apresoline; Take 1 tablet   (100 mg) by mouth 3 times a day for 23 days.; What changed: medication   strength, how much to take   isosorbide mononitrate  mg 24 hr tablet; Commonly known as: Imdur;   Take 1 tablet (120 mg) by mouth once daily. Do not crush or chew.; What   changed: medication strength, how much to take     CONTINUE taking these medications     * albuterol 1.25 mg/3 mL nebulizer solution;  Take 3 mL (1.25 mg) by   nebulization every 6 hours if needed for wheezing.   * albuterol 90 mcg/actuation inhaler; Commonly known as: Ventolin HFA;   Inhale 2 puffs 2 times a day.   amLODIPine 10 mg tablet; Commonly known as: Norvasc; TAKE 1 TABLET BY   MOUTH ONCE DAILY BEFORE DIALYSIS   aspirin 81 mg EC tablet   atorvastatin 80 mg tablet; Commonly known as: Lipitor; Take 1 tablet (80   mg) by mouth once daily at bedtime.   Deep Sea Nasal 0.65 % nasal spray; Generic drug: sodium chloride;   Administer 1 spray into each nostril 4 times a day as needed for   congestion.   Ensure liquid; Generic drug: nutritional drink; Take 118 mL by mouth 2   times a day.   epoetin joceline 10,000 unit/mL injection; Commonly known as:   Epogen,Procrit; Inject 1 mL (10,000 Units) under the skin 3 times a week.   fluticasone 50 mcg/actuation nasal spray; Commonly known as: Flonase;   Administer 2 sprays into each nostril once daily. Shake gently. Before   first use, prime pump. After use, clean tip and replace cap.   fluticasone furoate-vilanteroL 100-25 mcg/dose inhaler; Commonly known   as: Breo Ellipta   fluticasone propion-salmeteroL 100-50 mcg/dose diskus inhaler; Commonly   known as: Advair Diskus; Inhale 1 puff once daily.   gabapentin 300 mg capsule; Commonly known as: Neurontin; Take 1 capsule   (300 mg) by mouth once daily at bedtime.   hydrOXYzine HCL 10 mg tablet; Commonly known as: Atarax; Take 1 tablet   (10 mg) by mouth every 6 hours if needed for itching.   metoclopramide 5 mg tablet; Commonly known as: Reglan; TAKE 1 TABLET BY   MOUTH EVERY 6 HOURS AS NEEDED   multivitamin with minerals tablet   ondansetron 4 mg tablet; Commonly known as: Zofran; Take 1 tablet (4 mg)   by mouth every 8 hours if needed for nausea or vomiting.   pantoprazole 40 mg EC tablet; Commonly known as: ProtoNix; Take 1 tablet   (40 mg) by mouth once daily. Do not crush, chew, or split.   sucralfate 1 gram tablet; Commonly known as: Carafate    SUMAtriptan 50 mg tablet; Commonly known as: Imitrex; Take 1 tablet (50   mg) by mouth 1 time if needed for migraine.   torsemide 20 mg tablet; Commonly known as: Demadex   valsartan 160 mg tablet; Commonly known as: Diovan  * This list has 2 medication(s) that are the same as other medications   prescribed for you. Read the directions carefully, and ask your doctor or   other care provider to review them with you.       Outpatient Follow-Up  Future Appointments   Date Time Provider Department [place]   4/16/2024  1:00 PM Kacey Garzon MD [id number] Academic   4/22/2024  3:00 PM Kaitlin Tafoya MD [id number] Academic   4/24/2024  2:30 PM Mo Gutiérrez DO [id number] Academic   5/20/2024  2:00 PM John Francois RRT [id number] Academic   5/28/2024  2:30 PM Kyle Enriquez MD [id number] Academic   7/15/2024 10:45 AM Antonietta Manzo DPM [id number] [facility]     I saw and evaluated the patient.  I personally obtained the key and critical portions of the history and physical exam or was physically present for key and critical portions performed by the resident. I reviewed the resident's documentation and discussed the patient with the resident.  I agree with the resident's medical decision making as documented in the note.   spoke to the pt, explained the medical and social conditions and the reason for this admission explained our plan and recommendations.    ROS negative during eval. Meds to beds done so she received all her meds prior to dc. Eliquis 5 BID (pharmacy only had 2.5 available) and OP follow up PCP    Time spent on the assessment of patient, gathering and interpreting data, review of medical record/patient history, personally reviewing radiographic imaging. With greater than 50% spent in personal discussion with patient.  Time spent is more than 45 minutes.     Katerin Mejia MD

## 2024-04-14 NOTE — CARE PLAN
The patient's goals for the shift include discharge to home    The clinical goals for the shift include pt will remain hemodynamically stable throughout the shift    Over the shift, the patient walking about the floor  no c/o cp or  sob  tolerated lunch   discharged to home with family

## 2024-04-14 NOTE — PROGRESS NOTES
"Stacey Heath is a 53 y.o. female on day 11 of admission presenting with Shortness of breath.    Subjective   No acute events overnight, patient observed laying in bed on phone with daughter, in no apparent distress. Patient reports that she feels better than previous days. Denies n/v/d, fever, chills    Objective     Physical Exam  HENT:      Head: Normocephalic.      Nose: Nose normal.      Mouth/Throat:      Mouth: Mucous membranes are moist.   Eyes:      Pupils: Pupils are equal, round, and reactive to light.   Neck:      Comments: Right EJ line in place  Cardiovascular:      Rate and Rhythm: Normal rate.   Pulmonary:      Effort: Pulmonary effort is normal.   Abdominal:      General: Abdomen is flat.   Musculoskeletal:      Cervical back: Normal range of motion.   Skin:     General: Skin is warm.   Neurological:      General: No focal deficit present.      Mental Status: She is alert.         Last Recorded Vitals  Blood pressure 142/73, pulse 76, temperature 36.9 °C (98.4 °F), temperature source Temporal, resp. rate 16, height 1.397 m (4' 7\"), weight 54.9 kg (121 lb 0.5 oz), SpO2 99%.  Intake/Output last 3 Shifts:  I/O last 3 completed shifts:  In: 1360 (24.4 mL/kg) [P.O.:560; I.V.:600 (10.8 mL/kg); Other:200]  Out: 2700 (48.5 mL/kg) [Other:2700]  Dosing Weight: 55.7 kg     Relevant Results  apixaban, 5 mg, oral, BID  aspirin, 81 mg, oral, Daily  atorvastatin, 80 mg, oral, Nightly  carvedilol, 50 mg, oral, BID  cloNIDine, 0.2 mg, oral, q8h BELEN  docusate sodium, 100 mg, oral, BID  epoetin joceline or biosimilar, 6,000 Units, intravenous, Once per day on Tuesday Thursday Saturday  fluticasone, 2 spray, Each Nostril, Daily  fluticasone furoate-vilanteroL, 1 puff, inhalation, Daily  hydrALAZINE, 100 mg, oral, TID  isosorbide mononitrate ER, 120 mg, oral, Daily  lidocaine, 1 patch, transdermal, Daily  multivitamin with minerals, 1 tablet, oral, Daily  NIFEdipine ER, 90 mg, oral, Daily before breakfast  pantoprazole, 40 " mg, oral, Daily  perflutren lipid microspheres, 0.5-10 mL of dilution, intravenous, Once in imaging  perflutren protein A microsphere, 0.5 mL, intravenous, Once in imaging  sucralfate, 1 g, oral, BID AC  sulfur hexafluoride microsphr, 2 mL, intravenous, Once in imaging  valsartan, 160 mg, oral, BID       Results for orders placed or performed during the hospital encounter of 04/03/24 (from the past 96 hour(s))   CBC and Auto Differential   Result Value Ref Range    WBC 5.6 4.4 - 11.3 x10*3/uL    nRBC 0.0 0.0 - 0.0 /100 WBCs    RBC 3.38 (L) 4.00 - 5.20 x10*6/uL    Hemoglobin 9.5 (L) 12.0 - 16.0 g/dL    Hematocrit 32.1 (L) 36.0 - 46.0 %    MCV 95 80 - 100 fL    MCH 28.1 26.0 - 34.0 pg    MCHC 29.6 (L) 32.0 - 36.0 g/dL    RDW 15.9 (H) 11.5 - 14.5 %    Platelets 201 150 - 450 x10*3/uL    Neutrophils % 68.4 40.0 - 80.0 %    Immature Granulocytes %, Automated 0.4 0.0 - 0.9 %    Lymphocytes % 12.1 13.0 - 44.0 %    Monocytes % 11.1 2.0 - 10.0 %    Eosinophils % 7.5 0.0 - 6.0 %    Basophils % 0.5 0.0 - 2.0 %    Neutrophils Absolute 3.83 1.20 - 7.70 x10*3/uL    Immature Granulocytes Absolute, Automated 0.02 0.00 - 0.70 x10*3/uL    Lymphocytes Absolute 0.68 (L) 1.20 - 4.80 x10*3/uL    Monocytes Absolute 0.62 0.10 - 1.00 x10*3/uL    Eosinophils Absolute 0.42 0.00 - 0.70 x10*3/uL    Basophils Absolute 0.03 0.00 - 0.10 x10*3/uL   Renal Function Panel   Result Value Ref Range    Glucose 122 (H) 74 - 99 mg/dL    Sodium 138 136 - 145 mmol/L    Potassium 5.1 3.5 - 5.3 mmol/L    Chloride 100 98 - 107 mmol/L    Bicarbonate 26 21 - 32 mmol/L    Anion Gap 17 10 - 20 mmol/L    Urea Nitrogen 61 (H) 6 - 23 mg/dL    Creatinine 8.19 (H) 0.50 - 1.05 mg/dL    eGFR 5 (L) >60 mL/min/1.73m*2    Calcium 9.3 8.6 - 10.6 mg/dL    Phosphorus 4.8 2.5 - 4.9 mg/dL    Albumin 3.2 (L) 3.4 - 5.0 g/dL   Magnesium   Result Value Ref Range    Magnesium 2.19 1.60 - 2.40 mg/dL   POCT GLUCOSE   Result Value Ref Range    POCT Glucose 152 (H) 74 - 99 mg/dL   Renal  Function Panel   Result Value Ref Range    Glucose 205 (H) 74 - 99 mg/dL    Sodium 136 136 - 145 mmol/L    Potassium 4.6 3.5 - 5.3 mmol/L    Chloride 97 (L) 98 - 107 mmol/L    Bicarbonate 26 21 - 32 mmol/L    Anion Gap 18 10 - 20 mmol/L    Urea Nitrogen 33 (H) 6 - 23 mg/dL    Creatinine 5.95 (H) 0.50 - 1.05 mg/dL    eGFR 8 (L) >60 mL/min/1.73m*2    Calcium 9.0 8.6 - 10.6 mg/dL    Phosphorus 4.3 2.5 - 4.9 mg/dL    Albumin 3.0 (L) 3.4 - 5.0 g/dL   Magnesium   Result Value Ref Range    Magnesium 2.13 1.60 - 2.40 mg/dL   CBC and Auto Differential   Result Value Ref Range    WBC 6.5 4.4 - 11.3 x10*3/uL    nRBC 0.0 0.0 - 0.0 /100 WBCs    RBC 3.35 (L) 4.00 - 5.20 x10*6/uL    Hemoglobin 9.5 (L) 12.0 - 16.0 g/dL    Hematocrit 32.3 (L) 36.0 - 46.0 %    MCV 96 80 - 100 fL    MCH 28.4 26.0 - 34.0 pg    MCHC 29.4 (L) 32.0 - 36.0 g/dL    RDW 15.5 (H) 11.5 - 14.5 %    Platelets 224 150 - 450 x10*3/uL    Neutrophils % 65.5 40.0 - 80.0 %    Immature Granulocytes %, Automated 0.5 0.0 - 0.9 %    Lymphocytes % 14.9 13.0 - 44.0 %    Monocytes % 10.5 2.0 - 10.0 %    Eosinophils % 7.7 0.0 - 6.0 %    Basophils % 0.9 0.0 - 2.0 %    Neutrophils Absolute 4.25 1.20 - 7.70 x10*3/uL    Immature Granulocytes Absolute, Automated 0.03 0.00 - 0.70 x10*3/uL    Lymphocytes Absolute 0.97 (L) 1.20 - 4.80 x10*3/uL    Monocytes Absolute 0.68 0.10 - 1.00 x10*3/uL    Eosinophils Absolute 0.50 0.00 - 0.70 x10*3/uL    Basophils Absolute 0.06 0.00 - 0.10 x10*3/uL   Renal Function Panel   Result Value Ref Range    Glucose 140 (H) 74 - 99 mg/dL    Sodium 136 136 - 145 mmol/L    Potassium 4.9 3.5 - 5.3 mmol/L    Chloride 98 98 - 107 mmol/L    Bicarbonate 26 21 - 32 mmol/L    Anion Gap 17 10 - 20 mmol/L    Urea Nitrogen 50 (H) 6 - 23 mg/dL    Creatinine 7.88 (H) 0.50 - 1.05 mg/dL    eGFR 6 (L) >60 mL/min/1.73m*2    Calcium 9.1 8.6 - 10.6 mg/dL    Phosphorus 4.7 2.5 - 4.9 mg/dL    Albumin 3.0 (L) 3.4 - 5.0 g/dL   Magnesium   Result Value Ref Range    Magnesium 2.16  1.60 - 2.40 mg/dL   CBC and Auto Differential   Result Value Ref Range    WBC 5.8 4.4 - 11.3 x10*3/uL    nRBC 0.0 0.0 - 0.0 /100 WBCs    RBC 3.10 (L) 4.00 - 5.20 x10*6/uL    Hemoglobin 9.0 (L) 12.0 - 16.0 g/dL    Hematocrit 28.5 (L) 36.0 - 46.0 %    MCV 92 80 - 100 fL    MCH 29.0 26.0 - 34.0 pg    MCHC 31.6 (L) 32.0 - 36.0 g/dL    RDW 15.4 (H) 11.5 - 14.5 %    Platelets 234 150 - 450 x10*3/uL    Neutrophils % 66.2 40.0 - 80.0 %    Immature Granulocytes %, Automated 0.3 0.0 - 0.9 %    Lymphocytes % 14.1 13.0 - 44.0 %    Monocytes % 11.1 2.0 - 10.0 %    Eosinophils % 7.8 0.0 - 6.0 %    Basophils % 0.5 0.0 - 2.0 %    Neutrophils Absolute 3.81 1.20 - 7.70 x10*3/uL    Immature Granulocytes Absolute, Automated 0.02 0.00 - 0.70 x10*3/uL    Lymphocytes Absolute 0.81 (L) 1.20 - 4.80 x10*3/uL    Monocytes Absolute 0.64 0.10 - 1.00 x10*3/uL    Eosinophils Absolute 0.45 0.00 - 0.70 x10*3/uL    Basophils Absolute 0.03 0.00 - 0.10 x10*3/uL   Troponin I, High Sensitivity   Result Value Ref Range    Troponin I, High Sensitivity 31 0 - 34 ng/L      Vascular US Upper Extremity Venous Duplex Left    Result Date: 4/14/2024            [place] [address]   Tel [phone] and Fax [phone]  Vascular Lab Report [facility] US UPPER EXTREMITY VENOUS DUPLEX LEFT  Patient Name:      EMILEE Cruz Physician: [id number] Pat Reed MD Study Date:        4/12/2024           [facility]           [id number]                                        Physician: MRN/PID:           [id number]            Technologist:      Mikayla Reed RVS Accession#:        [id number]        Technologist 2: Date of Birth/Age: 1970 / 53 years Encounter#:        [id number] Gender:            F Admission Status:  Inpatient           Location           [facility]                                        Performed:  Diagnosis/ICD: Left arm  swelling-M79.89; Pain in left arm-M79.602 Indication:    Limb pain. CPT Codes:     19094 Peripheral venous duplex scan for DVT Limited  Patient History R AVF.  **CRITICAL RESULT** Critical Result: Acute DVT left brachial vein Notification called to Margarita Sarabia MD, Katerin Mejia MD on 4/12/2024 at 4:15:56 PM by Mikayla Reed S.  CONCLUSIONS: Right Upper Venous: The subclavian vein demonstrates a normal spontaneous and phasic flow. Left Upper Venous: There is acute non-occlusive deep vein thrombosis visualized in the brachial and acute non-occlusive superficial venous thrombosis visualized in the basilic veins.  Imaging & Doppler Findings:  Right                 Flow Subclavian Mid Spontaneous/Phasic  Left                Compress      Thrombus              Flow Internal Jugular      Yes           None             Pulsatile Subclavian            Yes           None             Pulsatile Subclavian Proximal   Yes           None             Pulsatile Subclavian Mid        Yes           None             Pulsatile Subclavian Distal     Yes           None             Pulsatile Axillary              Yes           None         Spontaneous/Phasic Brachial            Partial  Acute non-occlusive Cephalic              Yes           None Basilic             Partial  Acute non-occlusive  [id number] Pat Reed MD Electronically signed by [id number] Pat Reed MD on 4/14/2024 at 10:41:38 AM  ** Final **     XR chest 1 view    Result Date: 4/13/2024  Interpreted By:  Lauryn Torres, STUDY: XR CHEST 1 VIEW; 4/13/2024 5:21 pm   INDICATION: Signs/Symptoms:Chest pain w/ cough.   COMPARISON: Radiograph dated 04/03/2024   ACCESSION NUMBER(S): [id number]   ORDERING CLINICIAN: STANTON CARIAS   FINDINGS: The cardiac silhouette size is enlarged, stable. Stent is projecting over the right subclavian region   Left lung base and retrocardiac opacity, unchanged. Interval improvement in the aeration of the right lung base. Mild  interstitial prominence. No sizable pneumothorax.   No acute osseous abnormality.       1. Left lung base and retrocardiac consolidative opacity which can be infectious in etiology in appropriate clinical setting. Small left pleural effusion. 2. Mild interstitial prominence/edema. 3. Interval improvement in the aeration of the right lung base.       Signed by: Lauryn Pino 4/13/2024 5:36 PM Dictation workstation:   [id number]          Malnutrition          I agree with the dietitian's malnutrition diagnosis.      Assessment/Plan   Principal Problem:    Shortness of breath  Active Problems:    ESRD (end stage renal disease) on dialysis (Multi)    Ms. Stacey Heath is a 53 year old female with ESRD 2/2 HTN and T2DM and poorly controlled hypertension presenting with SOB in setting of hypertensive emergency despite stated medication and dialysis adherence. Admitted to CICU for management of hypertensive emergency. Restarted on PO meds as below. Transferred to the floor 4/8, now with brachial vein DVT in setting of PICC now on eliquis. Today patient had bout of emesis. Will monitor until after lunch with plans to discharge home with home care.     UPDATES 4/14  - continue eliquis 5 mg BID  -Will monitor for worsening emesis  -Plan for discharge home       #Hypertensive emergency  #Pulmonary edema  #HFpEF  -Presented with /99, max at 265/88  - Running in the 140s-160s currently   -nitroglycerin gtt and esmolo gtt weaned off, currently on nicardipine 10  - Clonidine 0.2mg Tid  - Zkavumlva128ar bid  - Coreg to 50 BID  - Hydral 100 TID  - Imdur 120 daily   - Nifedipine 90 daily   - STOPPED minoxidil 5   - continue home atorvastatin 80mg   -Has follow up with endocrine for evaluation for secondary causes of hypertension      #AHRF 2/2 volume overload/ chronicmoderate pulmonary edema  ::s/p multiple iHD sessions,  nephro following  -off supplemental 02  -discussed with patient the utility of monitoring dry weight.  Patient reports that she has been losing weight and that dry weight is inaccurate and that she may not be getting enough fluid removed. She states that she will further discuss with O/P nephrologist  - Continue with iHD TTHSAT with plans to change to daily home HD       #COPD?   -following with pulm fellow clinic at , no PFTs yet to confirm COPD diagnosis  -planned for outpatient CT chest, PFTs, and sleep study  -continue home albuterol prn and breo ellipta        #ESRD on dialysis T/Th/Sat via RUE AVF, makes 1 cup urine daily  #history of kidney stones  #Hypervolemia  -Nephro on board  -Dialysis per schedule   -dry weight reported as 52kg  -renally dose meds, avoid nephrotoxic medications. Patient did receive contrast for CTA aorta   -Renal appointment 4/4/24 missed while inpatient had been scheduled with Dr. Barraza, will need to  be rescheduled      -Chronic diarrhea, described as coming for a few weeks post prandial happens only after she eats something and she has 4-5 Bms that are watery, no BM as long as she is not eating and it resolves on its own   -Currently resolved      #T2DM c/b retinopathy, nephropathy and neuropathy  -no medications  -Glucose 70s-140s     #Anemia of chronic disease I/s/o ESRD  -on Epo outpatient   -daily cbc     #Nausea  -C/w home metoclopramide      #Acute DVT left brachial veing  - Started eliquis 5 mg     F: PRN  E: prn, ESRD  N: renal diet  A: L radial arterial line 4/3/24, RUE AVF  DVT ppx: SQH  GI ppx: home PPI    Code Status: Full code (confirmed on admission)  Surrogate Medical Decision-maker:      Daughter, Diya Jang ([phone]  and Rohini Piña [phone]             Margarita Sarabia MD

## 2024-04-14 NOTE — HH CARE COORDINATION
Home Care received a Referral for Physical Therapy and Occupational Therapy. We have processed the referral for a Start of Care on 4/15-4/16/24.     If you have any questions or concerns, please feel free to contact us at [phone]. Follow the prompts, enter your five digit zip code, and you will be directed to your care team on [facility] 3.

## 2024-04-14 NOTE — CARE PLAN
The patient's goals for the shift include Have BM on toilet    The clinical goals for the shift include pt will remain hemodynamically stable throughout the shift    Over the shift, the patient did make progress toward the following goals.    Problem: Skin  Goal: Decreased wound size/increased tissue granulation at next dressing change  Outcome: Progressing  Goal: Participates in plan/prevention/treatment measures  Outcome: Progressing  Goal: Prevent/manage excess moisture  Outcome: Progressing  Goal: Prevent/minimize sheer/friction injuries  Outcome: Progressing  Goal: Promote/optimize nutrition  Outcome: Progressing  Goal: Promote skin healing  Outcome: Progressing     Problem: Pain  Goal: Takes deep breaths with improved pain control throughout the shift  Outcome: Progressing  Goal: Turns in bed with improved pain control throughout the shift  Outcome: Progressing  Goal: Walks with improved pain control throughout the shift  Outcome: Progressing  Goal: Performs ADL's with improved pain control throughout shift  Outcome: Progressing  Goal: Participates in PT with improved pain control throughout the shift  Outcome: Progressing  Goal: Free from opioid side effects throughout the shift  Outcome: Progressing  Goal: Free from acute confusion related to pain meds throughout the shift  Outcome: Progressing     Problem: Diabetes  Goal: Achieve decreasing blood glucose levels by end of shift  Outcome: Progressing  Goal: Increase stability of blood glucose readings by end of shift  Outcome: Progressing  Goal: Decrease in ketones present in urine by end of shift  Outcome: Progressing  Goal: Maintain electrolyte levels within acceptable range throughout shift  Outcome: Progressing  Goal: Maintain glucose levels >70mg/dl to <250mg/dl throughout shift  Outcome: Progressing  Goal: No changes in neurological exam by end of shift  Outcome: Progressing  Goal: Learn about and adhere to nutrition recommendations by end of  shift  Outcome: Progressing  Goal: Vital signs within normal range for age by end of shift  Outcome: Progressing  Goal: Increase self care and/or family involovement by end of shift  Outcome: Progressing  Goal: Receive DSME education by end of shift  Outcome: Progressing     Problem: Pain - Adult  Goal: Verbalizes/displays adequate comfort level or baseline comfort level  Outcome: Progressing     Problem: Safety - Adult  Goal: Free from fall injury  Outcome: Progressing     Problem: Discharge Planning  Goal: Discharge to home or other facility with appropriate resources  Outcome: Progressing     Problem: Chronic Conditions and Co-morbidities  Goal: Patient's chronic conditions and co-morbidity symptoms are monitored and maintained or improved  Outcome: Progressing     Problem: Nutrition  Goal: Less than 5 days NPO/clear liquids  Outcome: Progressing  Goal: Oral intake greater than 50%  Outcome: Progressing  Goal: Oral intake greater 75%  Outcome: Progressing  Goal: Consume prescribed supplement  Outcome: Progressing  Goal: Adequate PO fluid intake  Outcome: Progressing  Goal: Nutrition support goals are met within 48 hrs  Outcome: Progressing  Goal: Nutrition support is meeting 75% of nutrient needs  Outcome: Progressing  Goal: Tube feed tolerance  Outcome: Progressing  Goal: BG  mg/dL  Outcome: Progressing  Goal: Lab values WNL  Outcome: Progressing  Goal: Electrolytes WNL  Outcome: Progressing  Goal: Promote healing  Outcome: Progressing  Goal: Maintain stable weight  Outcome: Progressing  Goal: Reduce weight from edema/fluid  Outcome: Progressing  Goal: Gradual weight gain  Outcome: Progressing  Goal: Improve ostomy output  Outcome: Progressing

## 2024-04-15 ENCOUNTER — DOCUMENTATION (OUTPATIENT)
Dept: BEHAVIORAL HEALTH | Facility: CLINIC | Age: 54
End: 2024-04-15
Payer: COMMERCIAL

## 2024-04-16 ENCOUNTER — OFFICE VISIT (OUTPATIENT)
Dept: CARDIOLOGY | Facility: HOSPITAL | Age: 54
End: 2024-04-16
Payer: COMMERCIAL

## 2024-04-16 VITALS
DIASTOLIC BLOOD PRESSURE: 70 MMHG | SYSTOLIC BLOOD PRESSURE: 158 MMHG | HEIGHT: 55 IN | OXYGEN SATURATION: 91 % | WEIGHT: 123 LBS | BODY MASS INDEX: 28.46 KG/M2 | HEART RATE: 88 BPM

## 2024-04-16 DIAGNOSIS — R06.02 SOB (SHORTNESS OF BREATH): ICD-10-CM

## 2024-04-16 DIAGNOSIS — I50.9 HEART FAILURE, UNSPECIFIED HF CHRONICITY, UNSPECIFIED HEART FAILURE TYPE (MULTI): ICD-10-CM

## 2024-04-16 PROCEDURE — 3048F LDL-C <100 MG/DL: CPT | Performed by: INTERNAL MEDICINE

## 2024-04-16 PROCEDURE — 3008F BODY MASS INDEX DOCD: CPT | Performed by: INTERNAL MEDICINE

## 2024-04-16 PROCEDURE — 99214 OFFICE O/P EST MOD 30 MIN: CPT | Performed by: INTERNAL MEDICINE

## 2024-04-16 PROCEDURE — 3078F DIAST BP <80 MM HG: CPT | Performed by: INTERNAL MEDICINE

## 2024-04-16 PROCEDURE — 3077F SYST BP >= 140 MM HG: CPT | Performed by: INTERNAL MEDICINE

## 2024-04-16 PROCEDURE — 3044F HG A1C LEVEL LT 7.0%: CPT | Performed by: INTERNAL MEDICINE

## 2024-04-16 ASSESSMENT — PAIN SCALES - GENERAL: PAINLEVEL: 6

## 2024-04-16 NOTE — PATIENT INSTRUCTIONS
Thank you for attending the cardiology clinic at [facility] today.  It was nice to meet you.  Your blood pressure    Your blood pressure was satisfactory in clinic today.      Continue current medications.  I will review these with your other physicians.    I will follow-up with you in clinic in 3 months to assess your progress.

## 2024-04-16 NOTE — PROGRESS NOTES
Subjective   Stacey Heath is a 53 y.o. female who presents for cardiology review on a background of HFpEF, hypertension, DVT, ESRD on HD.     Admitted to hospital last week with acute diastolic heart failure, hypertension. Admission complicated by non-occlusive DVT of the brachial vein + superficial thrombosis of the basilic veins.     Investigations:  TTE (4/4) - LVEF 60-65%, severe concentric LVH, mild-moderate LA dilatation.    Nuclear stress test [12/2023]-no evidence of inducible ischemia or prior infarction.  Normal LV size and systolic function.  CT chest/abdomen/pelvis [4/2024]-moderate-severe coronary calcifications noted.    CHCS pending.    Chief Complaint:  Pre-op Clearance (Transplant Evaluation.)    HPI  Since discharge reports general SOBOE. Walking has deteriorated compared to 3-months ago.   No missed dialysis sessions - had HD today.  Has been compliant with medications.   BP recordings at home have been elevated intermittently 190-200 mmHg.   No lower limb edema.   No chest pain.    Shx - lives with fiancee, not working, no ETOH.  No added salt diet.      ROS  A 10-system review was performed and was unremarkable apart from what is presented in the HPI.     Objective   Physical Exam  Alert and orientated.   Appropriate responses, normal affect.  No respiratory distress at rest.   Skin warm and dry. LUE - some firm swelling at the venous thrombus site.   Normal radial pulse character and volume. Clinically SR.   Anicteric sclera, no conjunctival pallor.   No JVD or carotid bruits.  Heart sounds dual, no added heart sounds or audible murmurs.   Chest clear on auscultation.   Calves soft, non-tender.  Mild bilateral ankle edema.    Lab Review:   Lab Results   Component Value Date     (L) 04/14/2024     04/13/2024     04/12/2024    K 4.2 04/14/2024    K 4.9 04/13/2024    K 4.6 04/12/2024    CO2 26 04/14/2024    CO2 26 04/13/2024    CO2 26 04/12/2024    BUN 32 (H) 04/14/2024    BUN  50 (H) 04/13/2024    BUN 33 (H) 04/12/2024    CREATININE 6.18 (H) 04/14/2024    CREATININE 7.88 (H) 04/13/2024    CREATININE 5.95 (H) 04/12/2024    GLUCOSE 104 (H) 04/14/2024    GLUCOSE 140 (H) 04/13/2024    GLUCOSE 205 (H) 04/12/2024    CALCIUM 9.8 04/14/2024    CALCIUM 9.1 04/13/2024    CALCIUM 9.0 04/12/2024     Lab Results   Component Value Date    CKTOTAL 169 05/20/2023    CKTOTAL 14 05/01/2023    TROPONINI 0.16 (H) 01/11/2022    TROPONINI 0.17 (H) 01/10/2022    TROPONINI 0.14 (H) 01/10/2022     Lab Results   Component Value Date    WBC 5.2 04/14/2024    HGB 10.2 (L) 04/14/2024    HCT 34.6 (L) 04/14/2024    MCV 93 04/14/2024     04/14/2024     Lab Results   Component Value Date    CHOL 154 03/29/2024    TRIG 103 01/15/2024    HDL 51.8 03/29/2024       Assessment/Plan   Stacey Heath is a 53 y.o. female who presents for cardiology review on a background of HFpEF. hypertension, DVT, ESRD on HD.  She was recently admitted to hospital with a HFpEF exacerbation and reports some persistent exertional dyspnea since discharge, albeit improved compared to prior to the admission.  She denies any chest discomfort a recent nuclear stress test showed no evidence of inducible ischemia.  However, her recent CT chest showed moderate-severe coronary calcifications, which will be quantified with a dedicated CT calcium score.  On examination today she was hypertensive with mild lower limb edema.  I advised her to continue her current medication and if she remains hypertensive on home recordings we can consider changing her valsartan to Entresto as she is already taking maximal doses of amlodipine, valsartan, hydralazine and also Sorbide mononitrate.  I will await the results of her CT calcium score but if there is evidence of severe underlying coronary artery burden I will arrange for definitive assessment with C/RHC.  I will follow-up with Mrs. Heath in the coming weeks to assess her progress.

## 2024-04-16 NOTE — PROGRESS NOTES
Stacey Heath  Age: 53 y.o.  MRN: [id number]  Date: 4/15/2024  Location of service: phone call    Program Details  Medicaid Community Clinical Case Management  Status: Enrolled  Effective Dates: 10/17/2023 - present  Responsible Staff: PORFIRIO Valderrama      Goals Reviewed:      Summary:  This writer calls patient at 1305 to confirm our appointment for today at 1530. Patient is unable to answer at this time. This writer leaves a voicemail.  This writer called at 1356 to confirm our appointment at 1530. Patient states she will be unable to meet today. Patient states she is getting her hair done and doing other self-care activities today.    Appointment start time: 1356  Appointment completion time: 1402  Total time spent with patient (in minutes): 6      Roberta Gallego RN

## 2024-04-19 ENCOUNTER — DOCUMENTATION (OUTPATIENT)
Dept: BEHAVIORAL HEALTH | Facility: CLINIC | Age: 54
End: 2024-04-19
Payer: COMMERCIAL

## 2024-04-19 ENCOUNTER — TELEPHONE (OUTPATIENT)
Dept: TRANSPLANT | Facility: HOSPITAL | Age: 54
End: 2024-04-19
Payer: COMMERCIAL

## 2024-04-19 DIAGNOSIS — Z01.818 PRE-TRANSPLANT EVALUATION FOR KIDNEY TRANSPLANT: ICD-10-CM

## 2024-04-22 ENCOUNTER — OFFICE VISIT (OUTPATIENT)
Dept: PULMONOLOGY | Facility: HOSPITAL | Age: 54
End: 2024-04-22
Payer: COMMERCIAL

## 2024-04-22 ENCOUNTER — DOCUMENTATION (OUTPATIENT)
Dept: BEHAVIORAL HEALTH | Facility: CLINIC | Age: 54
End: 2024-04-22
Payer: COMMERCIAL

## 2024-04-22 VITALS
SYSTOLIC BLOOD PRESSURE: 110 MMHG | TEMPERATURE: 97.7 F | DIASTOLIC BLOOD PRESSURE: 59 MMHG | BODY MASS INDEX: 28.12 KG/M2 | WEIGHT: 121 LBS | OXYGEN SATURATION: 95 % | HEART RATE: 72 BPM

## 2024-04-22 DIAGNOSIS — R19.7 DIARRHEA, UNSPECIFIED TYPE: ICD-10-CM

## 2024-04-22 DIAGNOSIS — J90 PLEURAL EFFUSION: ICD-10-CM

## 2024-04-22 DIAGNOSIS — R06.02 SOB (SHORTNESS OF BREATH): Primary | ICD-10-CM

## 2024-04-22 PROCEDURE — 99215 OFFICE O/P EST HI 40 MIN: CPT | Mod: GC | Performed by: STUDENT IN AN ORGANIZED HEALTH CARE EDUCATION/TRAINING PROGRAM

## 2024-04-22 PROCEDURE — 3048F LDL-C <100 MG/DL: CPT | Performed by: STUDENT IN AN ORGANIZED HEALTH CARE EDUCATION/TRAINING PROGRAM

## 2024-04-22 PROCEDURE — 3044F HG A1C LEVEL LT 7.0%: CPT | Performed by: STUDENT IN AN ORGANIZED HEALTH CARE EDUCATION/TRAINING PROGRAM

## 2024-04-22 PROCEDURE — 3008F BODY MASS INDEX DOCD: CPT | Performed by: STUDENT IN AN ORGANIZED HEALTH CARE EDUCATION/TRAINING PROGRAM

## 2024-04-22 PROCEDURE — 3078F DIAST BP <80 MM HG: CPT | Performed by: STUDENT IN AN ORGANIZED HEALTH CARE EDUCATION/TRAINING PROGRAM

## 2024-04-22 PROCEDURE — 3074F SYST BP LT 130 MM HG: CPT | Performed by: STUDENT IN AN ORGANIZED HEALTH CARE EDUCATION/TRAINING PROGRAM

## 2024-04-22 PROCEDURE — 99215 OFFICE O/P EST HI 40 MIN: CPT | Performed by: STUDENT IN AN ORGANIZED HEALTH CARE EDUCATION/TRAINING PROGRAM

## 2024-04-22 ASSESSMENT — PAIN SCALES - GENERAL: PAINLEVEL: 0-NO PAIN

## 2024-04-22 NOTE — PROGRESS NOTES
Subjective   Patient ID: Stacey Heath is a 53 y.o. female who presents for Follow-up, COPD, and Asthma.  HPI  This is a 54-year-old female patient with past medical history of end-stage renal disease on hemodialysis, UE DVT on Eliquis, HFpEF, hypertension, diabetes, coronary artery disease. Patient presented to the clinic for follow up for SOB.    Patient was seen in the clinic 3/18/2024, plan was to get CT scan of the chest to assess pleural effusion, and PFTs.     Unfortunately, patient needed to be admitted to CICU with hypertensive emergency. Was also treated for pneumonia.    Patient is doing well today, no SOB at rest but has MATHEW, no cough or chest pain.    She mentioned that she has stomach pain and chronic diarrhea, and would like to be seen by a gastroenterology  for assessment.    She is an ex-smoker, started smoking at the age of 19, 5 cig a day, and quit 2 years ago.     Review of Systems  As above.     Objective   Physical Exam  Constitutional:       General: She is not in acute distress.     Appearance: She is obese. She is not ill-appearing.   HENT:      Mouth/Throat:      Mouth: Mucous membranes are moist.      Pharynx: Oropharynx is clear. No oropharyngeal exudate.   Cardiovascular:      Rate and Rhythm: Normal rate and regular rhythm.      Pulses: Normal pulses.      Heart sounds: No murmur heard.  Pulmonary:      Effort: Pulmonary effort is normal. No respiratory distress.      Breath sounds: Normal breath sounds. No stridor. No wheezing.   Abdominal:      General: There is no distension.      Palpations: Abdomen is soft.      Tenderness: There is no abdominal tenderness.   Musculoskeletal:         General: No swelling or tenderness.   Skin:     General: Skin is warm and dry.   Neurological:      General: No focal deficit present.      Mental Status: She is alert.   Psychiatric:         Mood and Affect: Mood normal.           CT CHEST 4/4/2024:  MEDIASTINUM AND LYMPH NODES: No enlarged  intrathoracic or axillary  lymph nodes by CT size criteria. No pneumomediastinum.    HEART: Stable and moderately enlarged severe coronary artery  calcifications. No pericardial effusion.    LUNG, PLEURA, LARGE AIRWAYS: The trachea and central airways are  patent. No endobronchial lesions are evident. No consolidation or  pneumothorax. There is a small left-sided pleural effusion with  associated atelectasis. There is bilateral diffuse ground-glass  opacities that may be compatible with pulmonary edema. There is fluid  within the lung fissures.    OSSEOUS STRUCTURES/CHEST WALL: No acute osseous abnormality.      Assessment/Plan   Diagnoses and all orders for this visit:    SOB (shortness of breath)  Mainly exertional in nature, could be due to HFpEF, volume overload from ESRD,  or airway disease given the history of smoking. She is compliant with HD. She is currently of advair diskus, and albuterol PRN.   Most recent CT scan showed worsening of the LLL consolidation with stable small pleural effusion. Mostly in the setting volume overload.   Will get PFTs, O2 sat titration study, repeat PFTs, 6MWT.  Will also repeat CT scan in 8 weeks.  Left thoracentesis was done 5/2023: 300 mL of serosanguineous, no analysis available.  After getting all the tests done will do rohan-operative assessment for renal transplant.  -     Oximetry Desat/O2 Titration (Exercise Desat); Future  -     Follow Up In Pulmonology; Future  -     CT chest wo IV contrast; Future  -     Pulmonary Stress Test (6 Min. Walk); Future    Diarrhea, unspecified type  -     Referral to Gastroenterology; Future    RTC in 3 months after tests are done.    Kaitlin Tafoya MD 04/22/24 5:47 PM

## 2024-04-22 NOTE — PATIENT INSTRUCTIONS
Thanks for coming today!    Please schedule PFTs, 6 minute walk test and O2 titration.  Repeat CT scan of the chest in 8  weeks.  Will refer you to GI.  Will see you in 3 months.

## 2024-04-23 ENCOUNTER — DOCUMENTATION (OUTPATIENT)
Dept: BEHAVIORAL HEALTH | Facility: CLINIC | Age: 54
End: 2024-04-23
Payer: COMMERCIAL

## 2024-04-23 NOTE — PROGRESS NOTES
Provider called patient on the phone to check in for Personalized Care Program. Provider left a VM.

## 2024-04-23 NOTE — PROGRESS NOTES
"Stacey Heath  Age: 53 y.o.  MRN: [id number]  Date: 4/22/2024  Location of service: in community    Program Details  Medicaid Community Clinical Case Management  Status: Enrolled  Effective Dates: 10/17/2023 - present  Responsible Staff: PORFIRIO Valderrama      Goals Reviewed:  Problem: Negative Experience, Conflict with, or Distrust of Providers and/or Health System       Goal: Plan to Address Patient Specific Negative Experience, Distrust, or Conflict with Providers and/or Health System       Priority: High        Problem: Risk of Uncoordinated Care       Goal: Care will be Coordinated and Supported by a Multidisciplinary Team of Providers       Priority: High          Summary:  This writer met with patient for her pulmonology appointment to provide support and additional information.  Prior to pulmonology appointment:  Patient had to utilize transport to get up to her appointment, because she was unable to walk that far.  Patient shares she has been eating better lately, but would like to be referred to gastro because of stomach pains.  Patient states she was able to go to her granddaughter's birthday party and states she really enjoyed it.  Patient states she went to the cardiologist last week and she states it was \"not ask bad as she expected\" she states the provider wants her to keep a journal of her blood pressures and would like to eventually change her medications.  Patient states she has been getting new clothes because she doesn't feel like she will ever gain her weight back.   This writer messages Dr. Barraza via [employer] to see if patient's nephrology appointment can be moved up, since she isn't scheduled until 10/7/24. Dr. Barraza states she will have her assistant reach out to Stacey.  Patient states she was fitted for new teeth earlier today and states she is very excited to receive them.  Patient discusses her family at length.   Patient states she is very happy with her vitals taken today.  During " pulmonology appointment:  Provider reports partial lung collapse d/t fluid outside of the lung. Provider states continue dialysis and working on fluid overload. Provider states a procedure is not necessary.   This writer will assist patient with rescheduling pulmonary function test and O2 titration.  Patient will require another chest CT at the end of May, this writer will assist with scheduling.   Provider will place a gastro referral. This writer will assist with scheduling.    Provider will see patient in 3 months.  After pulmonology appointment:  This writer assisted patient with scheduling follow-up visit for pulmonology. They are unable to schedule patient's O2 titration or chest CT. This writer will call to schedule these appointments for patient.   Patient states she really likes this pulmonology provider and states he is very thorough and she understood everything he said.      Appointment start time: 1445  Appointment completion time: 1707  Total time spent with patient (in minutes): 142      Roberta Gallego RN

## 2024-04-24 ENCOUNTER — OFFICE VISIT (OUTPATIENT)
Dept: PRIMARY CARE | Facility: CLINIC | Age: 54
End: 2024-04-24
Payer: COMMERCIAL

## 2024-04-24 ENCOUNTER — DOCUMENTATION (OUTPATIENT)
Dept: TRANSPLANT | Facility: HOSPITAL | Age: 54
End: 2024-04-24

## 2024-04-24 VITALS
DIASTOLIC BLOOD PRESSURE: 78 MMHG | SYSTOLIC BLOOD PRESSURE: 183 MMHG | HEIGHT: 55 IN | WEIGHT: 130.4 LBS | TEMPERATURE: 97.3 F | HEART RATE: 82 BPM | OXYGEN SATURATION: 99 % | RESPIRATION RATE: 18 BRPM | BODY MASS INDEX: 30.18 KG/M2

## 2024-04-24 DIAGNOSIS — I82.622 ACUTE DEEP VEIN THROMBOSIS (DVT) OF LEFT UPPER EXTREMITY (MULTI): ICD-10-CM

## 2024-04-24 DIAGNOSIS — I10 HYPERTENSION, UNSPECIFIED TYPE: ICD-10-CM

## 2024-04-24 DIAGNOSIS — Z94.9 TRANSPLANT: ICD-10-CM

## 2024-04-24 DIAGNOSIS — I50.32 CHRONIC HEART FAILURE WITH PRESERVED EJECTION FRACTION (HFPEF) (MULTI): ICD-10-CM

## 2024-04-24 DIAGNOSIS — E78.5 HYPERLIPIDEMIA, UNSPECIFIED HYPERLIPIDEMIA TYPE: ICD-10-CM

## 2024-04-24 DIAGNOSIS — E11.42 DIABETIC POLYNEUROPATHY ASSOCIATED WITH TYPE 2 DIABETES MELLITUS (MULTI): ICD-10-CM

## 2024-04-24 PROCEDURE — 99214 OFFICE O/P EST MOD 30 MIN: CPT

## 2024-04-24 PROCEDURE — 3077F SYST BP >= 140 MM HG: CPT

## 2024-04-24 PROCEDURE — 3044F HG A1C LEVEL LT 7.0%: CPT

## 2024-04-24 PROCEDURE — 3008F BODY MASS INDEX DOCD: CPT

## 2024-04-24 PROCEDURE — 3078F DIAST BP <80 MM HG: CPT

## 2024-04-24 PROCEDURE — RXMED WILLOW AMBULATORY MEDICATION CHARGE

## 2024-04-24 PROCEDURE — 3048F LDL-C <100 MG/DL: CPT

## 2024-04-24 RX ORDER — AMLODIPINE BESYLATE 10 MG/1
TABLET ORAL
Qty: 30 TABLET | Refills: 11 | Status: SHIPPED | OUTPATIENT
Start: 2024-04-24 | End: 2025-04-24

## 2024-04-24 RX ORDER — CARVEDILOL 25 MG/1
50 TABLET ORAL 2 TIMES DAILY
Qty: 120 TABLET | Refills: 0 | Status: SHIPPED | OUTPATIENT
Start: 2024-04-24 | End: 2024-05-29

## 2024-04-24 RX ORDER — ASPIRIN 81 MG/1
81 TABLET ORAL DAILY
Qty: 90 TABLET | Refills: 3 | Status: SHIPPED | OUTPATIENT
Start: 2024-04-24 | End: 2025-04-24

## 2024-04-24 RX ORDER — TORSEMIDE 20 MG/1
TABLET ORAL
Qty: 90 TABLET | Refills: 3 | Status: SHIPPED | OUTPATIENT
Start: 2024-04-24 | End: 2024-05-15 | Stop reason: SDUPTHER

## 2024-04-24 RX ORDER — CLONIDINE HYDROCHLORIDE 0.2 MG/1
0.2 TABLET ORAL EVERY 8 HOURS SCHEDULED
Qty: 90 TABLET | Refills: 0 | Status: SHIPPED | OUTPATIENT
Start: 2024-04-24 | End: 2024-05-29

## 2024-04-24 RX ORDER — ATORVASTATIN CALCIUM 80 MG/1
80 TABLET, FILM COATED ORAL NIGHTLY
Qty: 30 TABLET | Refills: 0 | Status: SHIPPED | OUTPATIENT
Start: 2024-04-24 | End: 2024-05-30

## 2024-04-24 RX ORDER — PANTOPRAZOLE SODIUM 40 MG/1
40 TABLET, DELAYED RELEASE ORAL DAILY
Qty: 30 TABLET | Refills: 0 | Status: SHIPPED | OUTPATIENT
Start: 2024-04-24 | End: 2024-05-30

## 2024-04-24 RX ORDER — HYDRALAZINE HYDROCHLORIDE 100 MG/1
100 TABLET, FILM COATED ORAL 3 TIMES DAILY
Qty: 69 TABLET | Refills: 0 | Status: SHIPPED | OUTPATIENT
Start: 2024-04-24 | End: 2024-05-29

## 2024-04-24 RX ORDER — NIFEDIPINE 90 MG/1
90 TABLET, EXTENDED RELEASE ORAL
Qty: 30 TABLET | Refills: 0 | Status: SHIPPED | OUTPATIENT
Start: 2024-04-24 | End: 2024-05-29

## 2024-04-24 RX ORDER — GABAPENTIN 300 MG/1
300 CAPSULE ORAL NIGHTLY
Qty: 30 CAPSULE | Refills: 0 | Status: SHIPPED | OUTPATIENT
Start: 2024-04-24 | End: 2024-05-30

## 2024-04-24 ASSESSMENT — PAIN SCALES - GENERAL: PAINLEVEL: 0-NO PAIN

## 2024-04-24 NOTE — PROGRESS NOTES
HPI: Stacey Heath is a 53 year old female with extensive PMH presenting for hospital follow up after admission for hypertensive emergency from 4/3-4/14. She is feeling very well today and states that she is having a very good day. No new or increased SOB. She was able to walk from the car to the office without using the . Not using O2 today. Reporting some pitting edema that is not unusual for her. No chest pain, dizziness, palpitations, or headache. She has some L arm pain at the site of the DVT well controlled with lidocaine patches. States that she needs more lidocaine patches and refills on all medications.     PMH:   HTN  T2DM  COPD - baseline 1-2L NC  HFpEF - TTE on 4/4 w LVEF 60-65%, severe concentric LVH, mild-moderate LA dilatation  ESRD - 2/2 HTN and DM on dialysis T/Th/Sat - currently undergoing workup to see whether she is a candidate for transplant.     Hospital Course:   53 y.o. female with PMH ESRD 2/2 HTN and diabetes on dialysis T/Th/Sat (last complete session 4/2/24) and poorly controlled hypertension, DMT2, and COPD (baseline 1-2L NC) who presented to the ED 4/3/24 with SOB on waking and a headache. BP found to be in 230's/90's, CXR with pulmonary edena, labs notable for Cr 6.12, BNP 2016, troponin 193--> 116. Does not wear oxygen at home but notes that she often requires oxygen when she is fluid overloaded (1-2L). Currently being evaluated for kidney transplant and was noted to be significantly SOB at recent appointment 3/29/24, planned for outpatient CT chest, PFTs and sleep study. CT angio aorta and b/l ileofemoral to rule out aortic dissection. Admitted to CICU for hypertensive Emergency.      On arrival to CICU, patient awake and in no distress. Complains of feeling fluid overloaded by facial puffiness and 7/10 frontal headache that does not feel like her typical migraine headache. During her stay she was initially maintained on nitroglycerin gtt and then had to add esmolol gtt. She  eventually needed 3 drips to with adding cardene gtt. CTA angio was done initially for concern of aortic dissection but was unremarkable for an aortic pathology.     Home Medications prior to hospitalization:  Albuterol 90 mcg / puff BID  Albuterol 1.25 mg/ 3 mL q6 PRN  Amlodipine 10 mg tablet before dialysis  ASA 81   Atorvastatin 80 mg every day  Epoetin joceline 78872t / mL injection 3x weekly  Deep Sea Nasal 0.65 % nasal spray PRN - using infrequently / never  fluticasone 50 mcg/actuation nasal spray PRN - using infrequently / never  fluticasone furoate-vilanteroL 100-25 mcg/dose inhaler   gabapentin 300 mg every day  pantoprazole 40 mg every day  sucralfate 1 gram   torsemide 20 mg   valsartan 160 mg  hydroxyzine HCL 10 mg PRN   metoclopramide 5 mg PRN  ondansetron 4 mg PRN - using infrequently / never  Sumatriptan 50 mg PRN - using infrequently / never    Home medications changed during hospitalization:  carvedilol 25 mg BID  cloNIDine 0.2 mg q8h  hydralazine 100 mg TID  isosorbide mononitrate  mg 24 hr every day    Home medications added during hospitalization:  apixaban 2.5 mg BID  Lidocaine Pain Relief 4 % patch every day for 12 hours  Nifedipine ER 90 mg 24 hr every day     Despite the great number of medications in her med list, the patient reports compliance with medications and can list / name the ones she is taking and the ones she needs refills on.     OBJECTIVE  Visit Vitals  BP (!) 183/78 (BP Location: Left arm, Patient Position: Sitting, BP Cuff Size: Adult)   Pulse 82   Temp 36.3 °C (97.3 °F) (Temporal)   Resp 18     Physical exam:  Constitutional: Well-developed female in no acute distress.  HEENT: Normocephalic, atraumatic.   Respiratory: CTA bilaterally. No wheezes, rales, or rhonchi. Normal respiratory effort.  Cardiovascular: RRR. No gallops or rubs. 3/6 holosystolic murmur at the L upper sternal border. No S3 or S4.  Abdominal: Soft, nondistended.  Neuro: CN II-XII grossly intact to  conversation.  MSK: RUE AV fistula with palpable thrill. LUE DVT cord is palpable but nontender. B/l lower extremity 1+ pitting edema.   Skin: Warm, dry.   Psych: Appropriate mood and affect.    Assessment and plan:  Stacey Heath is a 53 y.o. female  presenting for hospital follow up after admission for hypertensive emergency from 4/3-4/14. Overall doing well. 9 lb weight gain since 4/22 but no SOB, CP, dizziness, headache. Some leg swelling noted. Dialysis tomorrow. BP still elevated today (183/78) but improved from BP during hospital stay. Detailed plans below:    # Hospital follow up  # Issues requiring follow up  - Secondary causes of hypertension - upcoming appt with nephrology in May  - Chronic pleural effusions - upcoming appt with nephrology in May  - Brachial DVT on apixaban - continue apixaban, clarified dosing, provided pain management    #DVT  - Prescribed more lidocaine patches for pain management   - Some question about dosing for Eliquis but discussed that patient needs to be taking 5 mg Eliquis BID for clot.    # Hypertension  - Refilled medications needed.  - Instructed patient to continue taking nifedipine daily and amlodipine before dialysis as prescribed inpatient. This is an unusual combination given these medications are in the same class; however the patient still has elevated BP and is tolerating this regimen well so told her to continue for now.  - Instructed patient to stop taking her valsartan and start taking sacubitril- valsartan at the direction of cardiology -- there is still room to improve her BP.   - Follow up with nephrology, cardiology  - Decide on blood pressure goal for medication management    # COPD  - Will check for pulmonology to drop note from previous visit, patient states discussed O2 therapy.    LOURDES Liu MS-3    Patient seen and discussed with Dr. Gutiérrez and Dr. Marlow.  Assessment & plan not finalized until signed by attending /  resident.    -------------------------------------  I saw and evaluated the patient. I personally obtained the key and critical portions of the history and physical exam or was physically present for key and critical portions performed by the student. I reviewed the student's documentation and discussed the patient with the student. I agree with the studen'ts medical decision making as documented in the note.      Stacey Heath is a 53 year old female with extensive PMH including ESRD, resistant HTN presenting for hospital follow up after admission for hypertensive emergency, flash pulmonary edema. Hospital course involved CICU admission, extensive diuresis, BP regimen adjustments. Pt is stable and doing well after discharge. No acute concerns. Pt requesting refills. Pt following closely with cardiology, pulmonology and is establish with a new nephrologist in several weeks.     Mo Gutiérrez, DO  PGY-1 Family Medicine

## 2024-04-24 NOTE — PATIENT INSTRUCTIONS
Eliquis: 5mg twice per day   If the dosage is 2.5mg, you will take two pills, twice per day (same as 5mg twice per day)     Continue nifedipine daily and amlodipine before dialysis sessions  STOP valsartan and START taking Entresto (valsartan-sacubitril)

## 2024-04-24 NOTE — PROGRESS NOTES
Per [employer] a/e 07/01/23 no ded 295.00 IP copay days 1-6 3900.00 oop max.  Listing approval will be needed.

## 2024-04-25 NOTE — PROGRESS NOTES
HPI: Stacey Heath is a 53 year old female with extensive PMH presenting for hospital follow up after admission for hypertensive emergency from 4/3-4/14. She is feeling very well today and states that she is having a very good day. No new or increased SOB. She was able to walk from the car to the office without using the . Not using O2 today. Reporting some pitting edema that is not unusual for her. No chest pain, dizziness, palpitations, or headache. She has some L arm pain at the site of the DVT well controlled with lidocaine patches. States that she needs more lidocaine patches and refills on all medications.      PMH:   HTN  T2DM  COPD - baseline 1-2L NC  HFpEF - TTE on 4/4 w LVEF 60-65%, severe concentric LVH, mild-moderate LA dilatation  ESRD - 2/2 HTN and DM on dialysis T/Th/Sat - currently undergoing workup to see whether she is a candidate for transplant.      Hospital Course:   53 y.o. female with PMH ESRD 2/2 HTN and diabetes on dialysis T/Th/Sat (last complete session 4/2/24) and poorly controlled hypertension, DMT2, and COPD (baseline 1-2L NC) who presented to the ED 4/3/24 with SOB on waking and a headache. BP found to be in 230's/90's, CXR with pulmonary edena, labs notable for Cr 6.12, BNP 2016, troponin 193--> 116. Does not wear oxygen at home but notes that she often requires oxygen when she is fluid overloaded (1-2L). Currently being evaluated for kidney transplant and was noted to be significantly SOB at recent appointment 3/29/24, planned for outpatient CT chest, PFTs and sleep study. CT angio aorta and b/l ileofemoral to rule out aortic dissection. Admitted to CICU for hypertensive Emergency.      On arrival to CICU, patient awake and in no distress. Complains of feeling fluid overloaded by facial puffiness and 7/10 frontal headache that does not feel like her typical migraine headache. During her stay she was initially maintained on nitroglycerin gtt and then had to add esmolol gtt. She  eventually needed 3 drips to with adding cardene gtt. CTA angio was done initially for concern of aortic dissection but was unremarkable for an aortic pathology.      Home Medications prior to hospitalization:  Albuterol 90 mcg / puff BID  Albuterol 1.25 mg/ 3 mL q6 PRN  Amlodipine 10 mg tablet before dialysis  ASA 81   Atorvastatin 80 mg every day  Epoetin joceline 95610n / mL injection 3x weekly  Deep Sea Nasal 0.65 % nasal spray PRN - using infrequently / never  fluticasone 50 mcg/actuation nasal spray PRN - using infrequently / never  fluticasone furoate-vilanteroL 100-25 mcg/dose inhaler   gabapentin 300 mg every day  pantoprazole 40 mg every day  sucralfate 1 gram   torsemide 20 mg   valsartan 160 mg  hydroxyzine HCL 10 mg PRN   metoclopramide 5 mg PRN  ondansetron 4 mg PRN - using infrequently / never  Sumatriptan 50 mg PRN - using infrequently / never     Home medications changed during hospitalization:  carvedilol 25 mg BID  cloNIDine 0.2 mg q8h  hydralazine 100 mg TID  isosorbide mononitrate  mg 24 hr every day     Home medications added during hospitalization:  apixaban 2.5 mg BID  Lidocaine Pain Relief 4 % patch every day for 12 hours  Nifedipine ER 90 mg 24 hr every day      Despite the great number of medications in her med list, the patient reports compliance with medications and can list / name the ones she is taking and the ones she needs refills on.      OBJECTIVE  Visit Vitals  BP (!) 183/78 (BP Location: Left arm, Patient Position: Sitting, BP Cuff Size: Adult)   Pulse 82   Temp 36.3 °C (97.3 °F) (Temporal)   Resp 18      Physical exam:  Constitutional: Well-developed female in no acute distress.  HEENT: Normocephalic, atraumatic.   Respiratory: CTA bilaterally. No wheezes, rales, or rhonchi. Normal respiratory effort.  Cardiovascular: RRR. No gallops or rubs. 3/6 holosystolic murmur at the L upper sternal border. No S3 or S4.  Abdominal: Soft, nondistended.  Neuro: CN II-XII grossly intact to  conversation.  MSK: RUE AV fistula with palpable thrill. LUE DVT cord is palpable but nontender. B/l lower extremity 1+ pitting edema.   Skin: Warm, dry.   Psych: Appropriate mood and affect.     Assessment and plan:  Stacey Heath is a 53 y.o. female  presenting for hospital follow up after admission for hypertensive emergency from 4/3-4/14. Overall doing well. 9 lb weight gain since 4/22 but no SOB, CP, dizziness, headache. Some leg swelling noted. Dialysis tomorrow. BP still elevated today (183/78) but improved from BP during hospital stay. Detailed plans below:     # Hospital follow up  # Issues requiring follow up  - Secondary causes of hypertension - upcoming appt with nephrology in May  - Chronic pleural effusions - upcoming appt with nephrology in May  - Brachial DVT on apixaban - continue apixaban, clarified dosing, provided pain management     #DVT  - Prescribed more lidocaine patches for pain management   - Some question about dosing for Eliquis but discussed that patient needs to be taking 5 mg Eliquis BID for clot.     # Hypertension  - Refilled medications needed.  - Instructed patient to continue taking nifedipine daily and amlodipine before dialysis as prescribed inpatient. This is an unusual combination given these medications are in the same class; however the patient still has elevated BP and is tolerating this regimen well so told her to continue for now.  - Instructed patient to stop taking her valsartan and start taking sacubitril- valsartan at the direction of cardiology -- there is still room to improve her BP.   - Follow up with nephrology, cardiology  - Decide on blood pressure goal for medication management     # COPD  - Will check for pulmonology to drop note from previous visit, patient states discussed O2 therapy.     LOURDES Liu MS-3     Patient seen and discussed with Dr. Gutiérrez and Dr. Marlow.  Assessment & plan not finalized until signed by attending / resident.      -------------------------------------  I saw and evaluated the patient. I personally obtained the key and critical portions of the history and physical exam or was physically present for key and critical portions performed by the student. I reviewed the student's documentation and discussed the patient with the student. I agree with the studen'ts medical decision making as documented in the note.       Stacey Heath is a 53 year old female with extensive PMH including ESRD, resistant HTN presenting for hospital follow up after admission for hypertensive emergency, flash pulmonary edema. Hospital course involved CICU admission, extensive diuresis, BP regimen adjustments. Pt is stable and doing well after discharge. No acute concerns. Pt requesting refills. Pt following closely with cardiology, pulmonology and is establish with a new nephrologist in several weeks.      Mo Gutiérrez, DO  PGY-1 Family Medicine

## 2024-04-25 NOTE — PROGRESS NOTES
HPI: Stacey Heath is a 53 year old female with extensive PMH presenting for hospital follow up after admission for hypertensive emergency from 4/3-4/14. She is feeling very well today and states that she is having a very good day. No new or increased SOB. She was able to walk from the car to the office without using the . Not using O2 today. Reporting some pitting edema that is not unusual for her. No chest pain, dizziness, palpitations, or headache. She has some L arm pain at the site of the DVT well controlled with lidocaine patches. States that she needs more lidocaine patches and refills on all medications.     PMH:   HTN  T2DM  COPD - baseline 1-2L NC  HFpEF - TTE on 4/4 w LVEF 60-65%, severe concentric LVH, mild-moderate LA dilatation  ESRD - 2/2 HTN and DM on dialysis T/Th/Sat - currently undergoing workup to see whether she is a candidate for transplant.     Hospital Course:   53 y.o. female with PMH ESRD 2/2 HTN and diabetes on dialysis T/Th/Sat (last complete session 4/2/24) and poorly controlled hypertension, DMT2, and COPD (baseline 1-2L NC) who presented to the ED 4/3/24 with SOB on waking and a headache. BP found to be in 230's/90's, CXR with pulmonary edena, labs notable for Cr 6.12, BNP 2016, troponin 193--> 116. Does not wear oxygen at home but notes that she often requires oxygen when she is fluid overloaded (1-2L). Currently being evaluated for kidney transplant and was noted to be significantly SOB at recent appointment 3/29/24, planned for outpatient CT chest, PFTs and sleep study. CT angio aorta and b/l ileofemoral to rule out aortic dissection. Admitted to CICU for hypertensive Emergency.      On arrival to CICU, patient awake and in no distress. Complains of feeling fluid overloaded by facial puffiness and 7/10 frontal headache that does not feel like her typical migraine headache. During her stay she was initially maintained on nitroglycerin gtt and then had to add esmolol gtt. She  eventually needed 3 drips to with adding cardene gtt. CTA angio was done initially for concern of aortic dissection but was unremarkable for an aortic pathology.     Home Medications prior to hospitalization:  Albuterol 90 mcg / puff BID  Albuterol 1.25 mg/ 3 mL q6 PRN  Amlodipine 10 mg tablet before dialysis  ASA 81   Atorvastatin 80 mg every day  Epoetin joceline 18382e / mL injection 3x weekly  Deep Sea Nasal 0.65 % nasal spray PRN - using infrequently / never  fluticasone 50 mcg/actuation nasal spray PRN - using infrequently / never  fluticasone furoate-vilanteroL 100-25 mcg/dose inhaler   gabapentin 300 mg every day  pantoprazole 40 mg every day  sucralfate 1 gram   torsemide 20 mg   valsartan 160 mg  hydroxyzine HCL 10 mg PRN   metoclopramide 5 mg PRN  ondansetron 4 mg PRN - using infrequently / never  Sumatriptan 50 mg PRN - using infrequently / never    Home medications changed during hospitalization:  carvedilol 25 mg BID  cloNIDine 0.2 mg q8h  hydralazine 100 mg TID  isosorbide mononitrate  mg 24 hr every day    Home medications added during hospitalization:  apixaban 2.5 mg BID  Lidocaine Pain Relief 4 % patch every day for 12 hours  Nifedipine ER 90 mg 24 hr every day     Despite the great number of medications in her med list, the patient reports compliance with medications and can list / name the ones she is taking and the ones she needs refills on.     OBJECTIVE  Visit Vitals  BP (!) 183/78 (BP Location: Left arm, Patient Position: Sitting, BP Cuff Size: Adult)   Pulse 82   Temp 36.3 °C (97.3 °F) (Temporal)   Resp 18     Physical exam:  Constitutional: Well-developed female in no acute distress.  HEENT: Normocephalic, atraumatic.   Respiratory: CTA bilaterally. No wheezes, rales, or rhonchi. Normal respiratory effort.  Cardiovascular: RRR. No gallops or rubs. 3/6 holosystolic murmur at the L upper sternal border. No S3 or S4.  Abdominal: Soft, nondistended.  Neuro: CN II-XII grossly intact to  conversation.  MSK: RUE AV fistula with palpable thrill. LUE DVT cord is palpable but nontender. B/l lower extremity 1+ pitting edema.   Skin: Warm, dry.   Psych: Appropriate mood and affect.    Assessment and plan:  Stacey Heath is a 53 y.o. female  presenting for hospital follow up after admission for hypertensive emergency from 4/3-4/14. Overall doing well. 9 lb weight gain since 4/22 but no SOB, CP, dizziness, headache. Some leg swelling noted. Dialysis tomorrow. BP still elevated today (183/78) but improved from BP during hospital stay. Detailed plans below:    # Hospital follow up  # Issues requiring follow up  - Secondary causes of hypertension - upcoming appt with nephrology in May  - Chronic pleural effusions - upcoming appt with nephrology in May  - Brachial DVT on apixaban - continue apixaban, clarified dosing, provided pain management    #DVT  - Prescribed more lidocaine patches for pain management   - Some question about dosing for Eliquis but discussed that patient needs to be taking 5 mg Eliquis BID for clot.    # Hypertension  - Refilled medications needed.  - Instructed patient to continue taking nifedipine daily and amlodipine before dialysis as prescribed inpatient. This is an unusual combination given these medications are in the same class; however the patient still has elevated BP and is tolerating this regimen well so told her to continue for now.  - Instructed patient to stop taking her valsartan and start taking sacubitril- valsartan at the direction of cardiology -- there is still room to improve her BP.   - Follow up with nephrology, cardiology  - Decide on blood pressure goal for medication management    # COPD  - Will check for pulmonology to drop note from previous visit, patient states discussed O2 therapy.    LOURDES iLu MS-3    Patient seen and discussed with Dr. Gutiérrez and Dr. Marlow.  Assessment & plan not finalized until signed by attending /  resident.    -------------------------------------  I saw and evaluated the patient. I personally obtained the key and critical portions of the history and physical exam or was physically present for key and critical portions performed by the student. I reviewed the student's documentation and discussed the patient with the student. I agree with the studen'ts medical decision making as documented in the note.      Stacey Heath is a 53 year old female with extensive PMH including ESRD, resistant HTN presenting for hospital follow up after admission for hypertensive emergency, flash pulmonary edema. Hospital course involved CICU admission, extensive diuresis, BP regimen adjustments. Pt is stable and doing well after discharge. No acute concerns. Pt requesting refills. Pt following closely with cardiology, pulmonology and is establish with a new nephrologist in several weeks.     Mo Gutiérrez, DO  PGY-1 Family Medicine

## 2024-04-25 NOTE — PROGRESS NOTES
Reason for Nutrition Visit:  Pt is a 53 y.o. female referred for initial MNT. Pt is being evaluated  for kidney transplant.     Past Medical Hx:  Patient Active Problem List   Diagnosis    Abscess of axillary region    Anxiety    Astigmatism    Carotid bruit    Chronic migraine with aura    Congenital cataract    Diabetes mellitus type 2, uncomplicated (Multi)    Inflammation of stomach and intestine    Coronary artery disease involving native coronary artery    Dialysis patient (CMS-[facility])    Nephropathy    Essential hypertension    Iron deficiency anemia    Low grade squamous intraepith lesion on cytologic smear cervix (lgsil)    Low grade squamous intraepithelial lesion on cytologic smear of cervix (LGSIL)    Dialysis AV fistula malfunction (CMS-[facility])    Migraine    Neuropathy    Nicotine use disorder    Other pericardial effusion (noninflammatory) ([facility])    Cough, unspecified    Dyspnea on exertion    Shoulder pain, bilateral    Sweating abnormality    Transplant    Acute pulmonary edema with heart disease (Multi)    Respiratory failure (Multi)    Poorly-controlled hypertension    Polyneuropathy due to type 2 diabetes mellitus (Multi)    Obesity, Class I, BMI 30-34.9    Nuclear senile cataract of both eyes    Normocytic normochromic anemia    Low back pain    Hidradenitis    Complex dyslipidemia    Chronic heart failure with preserved ejection fraction (HFpEF) (Multi)    ESRD (end stage renal disease) on dialysis (Multi)    Hypervolemia    ERRONEOUS ENCOUNTER--DISREGARD    Malnutrition of moderate degree (Multi)    Shortness of breath        Food and Nutrition Hx:  Pt reports appetite is good, tries not to eat much fried food or sweets. She reports she does have some days where she isn't hungry at all but she still tries to eat something.   24 Diet Recall:  Meal 1: 8 am, oatmeal with fruit, cream of wheat, mcfadden/egg sandwich  Meal 2: nothing,   Meal 3: 6-7, spaghetti, baked chicken, beef, meatloaf,   Snacks:  crackers, hard candy   Beverages: small Gatorade, eat ice, 32 oz    WEIGHT HISTORY  CW: 121# (55 kg)  BMI: 28.1 (overweight)  Dry Weight: 57 kg  Weight change:  No  Significant Weight Change: No    Lab Results   Component Value Date    HGBA1C 3.7 03/29/2024    CHOL 154 03/29/2024    LDLF 99 12/12/2020    TRIG 103 01/15/2024    BUN 32 (H) 04/14/2024    PHOS 5.0 (H) 04/14/2024    K 4.2 04/14/2024        Food Preparation: Patient  Cooking Skills/Barriers: None reported  Grocery Shopping: Patient        Allergies: citrus  Intolerance: None  Appetite: Good  Intake: >75%  GI Symptoms : nausea and diarrhea Frequency: frequent  Swallowing Difficulty: No problems with swallowing  Dentition : own    Eating Out Type: Fast Food, Restaurant, and Take Out  1 times per month  Convenience Foods: Denies     Types of Activities: Mostly Sedentary    Sleep duration/quality : 1-4 hours, 5-6 hours, and disrupted sleep    Supplements: Denies       Nutrition Focused Physical Exam:    Performed/Deferred: Deferred due to be being virtual visit    Malnutrition Present: No    Estimated Energy Needs:    Weight Maintanence: 25 kcal/kg/day and 1.2-1.3 g/pro/kg/day    Estimated Needs: 1400 kcal for weight maintenance, 66-72 grams of protein, and 32 oz fluid    Nutrition Diagnosis:    Diagnosis Statement 1:  Diagnosis Status: New  Diagnosis : Food and nutrition related knowledge deficit related to lack of or limited prior nutrition-related education as evidenced by  need for dialysis diet, high phos value 5.0      Nutrition Interventions:    Explained Diet For Dialysis:  Sodium: We reviewed the importance and compliance with a 2 gm sodium diet. Recommend decreasing high sodium foods such as soups, gravies, regular deli meats, condiments, prepackaged foods, crackers, chips.  One teaspoon of salt contain more than 2000 mg of sodium.  When reviewing a food label, choose foods than have less than 20% of the daily value of sodium. Dicussed with pt  limiting amount of eating out and to avoid adding salt to his food.   Phosphorous: The kidneys mainly control the balance of phosphorus in the body. When phosphorus builds up in the body, it  causes calcium to come out of your bones. This can lead to weak bones that can be painful and break easily.  Too much phosphorus in your blood can also lead to heart disease. Went over food choices high/low in phosphorus. Pt recent lab value borderline high 5.0.   Fluid: Drink fluids to keep hydrated. Water is best. Dicussed that her fluid needs are about 32 oz per day.   Potassium: The muscles and nerves in your body use potassium to function. Too much or too little potassium can  prevent your heart muscle from working properly. If you have been told to limit your potassium intake, you will want to be especially careful about eating fruits and vegetables that are high in  potassium. Reviewed foods high/low in potassium. Potassium lab value within range.   Protein : Protein keeps your muscles strong and helps you prevent   and fight infections. For best health, and to help replace   what is lost during dialysis treatments, eat a high-  protein diet every day.   Nutrition Goals:  Nutrition Goals : Compliance with Dialysis diet    Nutrition Recommendations:  1) None at this time.     Educational Handouts: Nutrition for dialysis

## 2024-04-26 ENCOUNTER — NUTRITION (OUTPATIENT)
Dept: TRANSPLANT | Facility: HOSPITAL | Age: 54
End: 2024-04-26
Payer: COMMERCIAL

## 2024-04-26 ENCOUNTER — DOCUMENTATION (OUTPATIENT)
Dept: TRANSPLANT | Facility: HOSPITAL | Age: 54
End: 2024-04-26

## 2024-04-26 ENCOUNTER — APPOINTMENT (OUTPATIENT)
Dept: TRANSPLANT | Facility: HOSPITAL | Age: 54
End: 2024-04-26
Payer: COMMERCIAL

## 2024-04-30 ENCOUNTER — PHARMACY VISIT (OUTPATIENT)
Dept: PHARMACY | Facility: CLINIC | Age: 54
End: 2024-04-30
Payer: MEDICARE

## 2024-04-30 PROCEDURE — RXMED WILLOW AMBULATORY MEDICATION CHARGE

## 2024-04-30 NOTE — PROGRESS NOTES
I saw and evaluated the patient. I personally obtained the key and critical portions of the history and physical exam or was physically present for key and critical portions performed by the resident/fellow. I reviewed the resident/fellow's documentation and discussed the patient with the resident/fellow. I agree with the resident/fellow's medical decision making as documented in the note.    Cate Marlow MD

## 2024-05-03 ENCOUNTER — DOCUMENTATION (OUTPATIENT)
Dept: BEHAVIORAL HEALTH | Facility: CLINIC | Age: 54
End: 2024-05-03
Payer: COMMERCIAL

## 2024-05-07 NOTE — PROGRESS NOTES
Stacey Heath  Age: 53 y.o.  MRN: [id number]  Date: 5/3/2024  Location of service: phone call    Program Details  Medicaid Community Clinical Case Management  Status: Enrolled  Effective Dates: 10/17/2023 - present  Responsible Staff: PORFIRIO Valderrama      Goals Reviewed:      Summary:  This writer calls to confirm our appointment for today. Patient confirms our appointment at this time.    Appointment start time: 0900  Appointment completion time: 0904  Total time spent with patient (in minutes): 4      Roberta Gallego RN

## 2024-05-07 NOTE — PROGRESS NOTES
Stacey Heath  Age: 53 y.o.  MRN: [id number]  Date: 5/3/2024  Location of service: in community    Program Details  Medicaid Community Clinical Case Management  Status: Enrolled  Effective Dates: 10/17/2023 - present  Responsible Staff: PORFIRIO Valderrama      Goals Reviewed:  Problem: Negative Experience, Conflict with, or Distrust of Providers and/or Health System       Goal: Plan to Address Patient Specific Negative Experience, Distrust, or Conflict with Providers and/or Health System       Priority: High        Problem: Risk of Uncoordinated Care       Goal: Care will be Coordinated and Supported by a Multidisciplinary Team of Providers       Priority: High          Summary:  This writer meets with patient in her home.  Patient is eating during our appointment.   Patient discusses feeling like her dialysis center is wrong about her dry weight.  Patient states she needs dialysis daily but does not have anyone in her home who will do able to do it for her. Patient is hoping for another option.   Patient discusses wanting consistency with her providers.  Patient states Dr. Gutiérrez is leaving and she is hoping to find someone who is as good as him as her PCP. This writer messages Dr. Gutiérrez via [employer] to ask about another provider.   Patient states she was thinking about asking one of her family members to donate a kidney, but states everyone in her family smokes weed.   Patient discusses family issues at length.   This writer assisted patient with calling to reschedule her pulmonary tests from 5/20 to 5/22 because of a scheduling conflict.     Appointment start time: 0959  Appointment completion time: 1115  Total time spent with patient (in minutes): 76      Roberta Gallego RN

## 2024-05-09 ENCOUNTER — APPOINTMENT (OUTPATIENT)
Dept: PODIATRY | Facility: HOSPITAL | Age: 54
End: 2024-05-09
Payer: COMMERCIAL

## 2024-05-10 ENCOUNTER — DOCUMENTATION (OUTPATIENT)
Dept: BEHAVIORAL HEALTH | Facility: CLINIC | Age: 54
End: 2024-05-10
Payer: COMMERCIAL

## 2024-05-10 ENCOUNTER — TELEPHONE (OUTPATIENT)
Dept: PRIMARY CARE | Facility: CLINIC | Age: 54
End: 2024-05-10

## 2024-05-10 DIAGNOSIS — F41.9 ANXIETY: ICD-10-CM

## 2024-05-10 DIAGNOSIS — I82.622 ACUTE DEEP VEIN THROMBOSIS (DVT) OF LEFT UPPER EXTREMITY, UNSPECIFIED VEIN (MULTI): Primary | ICD-10-CM

## 2024-05-10 NOTE — PROGRESS NOTES
"Stacey Heath  Age: 53 y.o.  MRN: [id number]  Date: 5/10/2024  Location of service: phone call    Program Details  Medicaid Community Clinical Case Management  Status: Enrolled  Effective Dates: 10/17/2023 - present  Responsible Staff: PORFIRIO Valderrama      Goals Reviewed:    Problem: Risk of Uncoordinated Care       Goal: Care will be Coordinated and Supported by a Multidisciplinary Team of Providers       Priority: High          Summary:  Provider met with patient via telephone call. Provider utilized empathic listening to facilitate a discussion with patient about how she is currently doing. Patient reports that she is doing \"okay\"- she has a lot of upcoming appointments due to trying to receive a kidney transplant. Patient reported that she needs a refill of her Eliquis as she will be out next week. Provider reached out to patient's PCP about a refill and also informed Nurse JORDAN Gallego about refill request. Provider discussed upcoming appointments with patient and also got scheduled for a visit the following week.     Appointment start time: 1000  Appointment completion time: 1030  Total time spent with patient (in minutes): 30      PORFIRIO Valderrama   "

## 2024-05-13 ENCOUNTER — APPOINTMENT (OUTPATIENT)
Dept: BEHAVIORAL HEALTH | Facility: CLINIC | Age: 54
End: 2024-05-13
Payer: COMMERCIAL

## 2024-05-13 ENCOUNTER — DOCUMENTATION (OUTPATIENT)
Dept: BEHAVIORAL HEALTH | Facility: CLINIC | Age: 54
End: 2024-05-13
Payer: COMMERCIAL

## 2024-05-13 DIAGNOSIS — F41.9 ANXIETY: ICD-10-CM

## 2024-05-13 PROCEDURE — H2019 THER BEHAV SVC, PER 15 MIN: HCPCS | Mod: AJ,HE | Performed by: SOCIAL WORKER

## 2024-05-14 NOTE — PROGRESS NOTES
"Stacey Heath  Age: 53 y.o.  MRN: [id number]  Date: 5/13/2024  Location of service: in community    Program Details  Medicaid Community Clinical Case Management  Status: Enrolled  Effective Dates: 10/17/2023 - present  Responsible Staff: PORFIRIO Valderrama      Goals Reviewed:  Problem: Anxiety       Goal: Attempts to manage anxiety with help       Priority: High         Goal: Verbalizes ways to manage anxiety       Priority: High         Goal: Implements measures to reduce anxiety       Priority: High          Problem: Risk of Uncoordinated Care       Goal: Care will be Coordinated and Supported by a Multidisciplinary Team of Providers       Priority: High          Summary:  Provider and patient met at patient's home. Provider  utilized empathic listening to facilitate a discussion with patient about how she is currently doing. Patient reports she is doing \"well\"- she has been feeling better with her physical health but is still waiting to hear about a refill on her Eliquis. Provider did care coordination with patient's PCP and pharmacy to help patient get refill on her Eliquis. Provider was able to get patient's refill ready for her the following day. Provider and patient also discussed patient's anxiety related to her physical health and discussed coping skills patient can use in order to cope with increase in anxiety.     Appointment start time: 1335  Appointment completion time: 1435  Total time spent with patient (in minutes): 60      PORFIRIO Valderrama   "

## 2024-05-15 ENCOUNTER — OFFICE VISIT (OUTPATIENT)
Dept: NEPHROLOGY | Facility: CLINIC | Age: 54
End: 2024-05-15
Payer: COMMERCIAL

## 2024-05-15 ENCOUNTER — DOCUMENTATION (OUTPATIENT)
Dept: BEHAVIORAL HEALTH | Facility: CLINIC | Age: 54
End: 2024-05-15

## 2024-05-15 VITALS
RESPIRATION RATE: 18 BRPM | WEIGHT: 126.8 LBS | DIASTOLIC BLOOD PRESSURE: 73 MMHG | HEIGHT: 55 IN | BODY MASS INDEX: 29.35 KG/M2 | SYSTOLIC BLOOD PRESSURE: 153 MMHG | HEART RATE: 73 BPM | TEMPERATURE: 97.3 F

## 2024-05-15 DIAGNOSIS — I82.622 ACUTE DEEP VEIN THROMBOSIS (DVT) OF LEFT UPPER EXTREMITY (MULTI): ICD-10-CM

## 2024-05-15 DIAGNOSIS — I50.32 CHRONIC HEART FAILURE WITH PRESERVED EJECTION FRACTION (HFPEF) (MULTI): ICD-10-CM

## 2024-05-15 DIAGNOSIS — N18.6 ESRD (END STAGE RENAL DISEASE) (MULTI): Primary | ICD-10-CM

## 2024-05-15 RX ORDER — TORSEMIDE 20 MG/1
TABLET ORAL
Qty: 150 TABLET | Refills: 3 | Status: SHIPPED | OUTPATIENT
Start: 2024-05-15

## 2024-05-15 NOTE — PROGRESS NOTES
Chief Complaint: Follow up ESRD    No specific complaints  Tolerating dialysis without event to DW 55  Multiple admissions for volume overload  Denies nausea, vomiting, chest pain, dyspnea  No urinary symptoms    NAD  Sclera AI s inj  MMM, no sores  Deferred secondary to COVID  No edema  No tremor  No rash  R UE AVF    ESRD  HTN, volume overload  Bleeding from AVF post procedure    Hold sustained pressure on AVF for 20 min   Change to [facility]   IUF weekly x 2, challenge to DW 51  Torsemide to bolwell twice daily on non dialysis days       AVNRT (AV tejal re-entry tachycardia)

## 2024-05-20 ENCOUNTER — APPOINTMENT (OUTPATIENT)
Dept: RESPIRATORY THERAPY | Facility: HOSPITAL | Age: 54
End: 2024-05-20
Payer: COMMERCIAL

## 2024-05-20 ENCOUNTER — OFFICE VISIT (OUTPATIENT)
Dept: TRANSPLANT | Facility: HOSPITAL | Age: 54
End: 2024-05-20
Payer: COMMERCIAL

## 2024-05-20 DIAGNOSIS — F43.22 ADJUSTMENT DISORDER WITH ANXIOUS MOOD: ICD-10-CM

## 2024-05-20 DIAGNOSIS — N18.6 ESRD (END STAGE RENAL DISEASE) (MULTI): ICD-10-CM

## 2024-05-20 DIAGNOSIS — Z01.818 PRE-TRANSPLANT EVALUATION FOR KIDNEY TRANSPLANT: Primary | ICD-10-CM

## 2024-05-20 PROCEDURE — 3044F HG A1C LEVEL LT 7.0%: CPT | Performed by: PSYCHOLOGIST

## 2024-05-20 PROCEDURE — 3048F LDL-C <100 MG/DL: CPT | Performed by: PSYCHOLOGIST

## 2024-05-20 PROCEDURE — 90791 PSYCH DIAGNOSTIC EVALUATION: CPT | Performed by: PSYCHOLOGIST

## 2024-05-20 NOTE — PROGRESS NOTES
BEHAVIORAL HEALTH: PSYCHOLOGICAL EVALUATION FOR TRANSPLANT CANDIDACY    Patient's Name: Stacey Heath  :  1970  MRN:  [id number]    Diagnosis: Pre-transplant evaluation for kidney transplant; ESRD; Adjustment disorder with anxious mood    Date of Service: 2024    Start Time: 1:05 pm   End Time: 2:10 pm  Location: Transplant [place], [facility]     Reason for Referral: Stacey Heath has been diagnosed with End-stage renal disease in the setting of diabetes and is being seen for a psychological assessment as one part of a comprehensive evaluation for consideration for transplantation. She has a current  through  Behavioral [facility] due to frequent ED visits.    PROBLEM LIST   Patient Active Problem List   Diagnosis    Abscess of axillary region    Anxiety    Astigmatism    Carotid bruit    Chronic migraine with aura    Congenital cataract    Diabetes mellitus type 2, uncomplicated (Multi)    Inflammation of stomach and intestine    Coronary artery disease involving native coronary artery    Dialysis patient (CMS-[facility])    Nephropathy    Essential hypertension    Iron deficiency anemia    Low grade squamous intraepith lesion on cytologic smear cervix (lgsil)    Low grade squamous intraepithelial lesion on cytologic smear of cervix (LGSIL)    Dialysis AV fistula malfunction (CMS-[facility])    Migraine    Neuropathy    Nicotine use disorder    Other pericardial effusion (noninflammatory) ([facility])    Cough, unspecified    Dyspnea on exertion    Shoulder pain, bilateral    Sweating abnormality    Transplant    Acute pulmonary edema with heart disease (Multi)    Respiratory failure (Multi)    Poorly-controlled hypertension    Polyneuropathy due to type 2 diabetes mellitus (Multi)    Obesity, Class I, BMI 30-34.9    Nuclear senile cataract of both eyes    Normocytic normochromic anemia    Low back pain    Hidradenitis    Complex dyslipidemia    Chronic heart failure with preserved ejection fraction (HFpEF)  (Multi)    ESRD (end stage renal disease) on dialysis (Multi)    Hypervolemia    ERRONEOUS ENCOUNTER--DISREGARD    Malnutrition of moderate degree (Multi)    Shortness of breath        CURRENT MEDICATIONS    Current Outpatient Medications:     albuterol 1.25 mg/3 mL nebulizer solution, Take 3 mL (1.25 mg) by nebulization every 6 hours if needed for wheezing., Disp: 90 mL, Rfl: 11    amLODIPine (Norvasc) 10 mg tablet, TAKE 1 TABLET BY MOUTH ONCE DAILY BEFORE DIALYSIS, Disp: 30 tablet, Rfl: 11    apixaban (Eliquis) 5 mg tablet, Take 1 tablet (5 mg) by mouth 2 times a day., Disp: 60 tablet, Rfl: 1    apixaban (Eliquis) 5 mg tablet, Take 1 tablet (5 mg) by mouth 2 times a day., Disp: 120 tablet, Rfl: 0    aspirin 81 mg EC tablet, Take 1 tablet (81 mg) by mouth once daily., Disp: 90 tablet, Rfl: 3    atorvastatin (Lipitor) 80 mg tablet, Take 1 tablet (80 mg) by mouth once daily at bedtime., Disp: 30 tablet, Rfl: 0    carvedilol (Coreg) 25 mg tablet, Take 2 tablets (50 mg) by mouth 2 times a day., Disp: 120 tablet, Rfl: 0    cloNIDine (Catapres) 0.2 mg tablet, Take 1 tablet (0.2 mg) by mouth every 8 hours., Disp: 90 tablet, Rfl: 0    epoetin joceline (Epogen,Procrit) 10,000 unit/mL injection, Inject 1 mL (10,000 Units) under the skin 3 times a week., Disp: , Rfl:     fluticasone (Flonase) 50 mcg/actuation nasal spray, Administer 2 sprays into each nostril once daily. Shake gently. Before first use, prime pump. After use, clean tip and replace cap., Disp: 16 g, Rfl: 12    fluticasone furoate-vilanteroL (Breo Ellipta) 100-25 mcg/dose inhaler, Inhale once daily., Disp: , Rfl:     fluticasone propion-salmeteroL (Advair Diskus) 100-50 mcg/dose diskus inhaler, Inhale 1 puff once daily., Disp: 60 each, Rfl: 0    gabapentin (Neurontin) 300 mg capsule, Take 1 capsule (300 mg) by mouth once daily at bedtime., Disp: 30 capsule, Rfl: 0    hydrALAZINE (Apresoline) 100 mg tablet, Take 1 tablet (100 mg) by mouth 3 times a day for 23 days.,  Disp: 69 tablet, Rfl: 0    hydrOXYzine HCL (Atarax) 10 mg tablet, Take 1 tablet (10 mg) by mouth every 6 hours if needed for itching., Disp: 30 tablet, Rfl: 1    isosorbide mononitrate ER (Imdur) 120 mg 24 hr tablet, Take 1 tablet (120 mg) by mouth once daily. Do not crush or chew., Disp: 30 tablet, Rfl: 0    metoclopramide (Reglan) 5 mg tablet, TAKE 1 TABLET BY MOUTH EVERY 6 HOURS AS NEEDED, Disp: 40 tablet, Rfl: 0    multivitamin with minerals tablet, Take 1 tablet by mouth once daily., Disp: , Rfl:     NIFEdipine ER (Adalat CC) 90 mg 24 hr tablet, Take 1 tablet (90 mg) by mouth once daily in the morning. Take before meals. Do not crush, chew, or split., Disp: 30 tablet, Rfl: 0    nutritional drink (Ensure) liquid, Take 118 mL by mouth 2 times a day., Disp: 38273 mL, Rfl: 3    ondansetron (Zofran) 4 mg tablet, Take 1 tablet (4 mg) by mouth every 8 hours if needed for nausea or vomiting., Disp: 30 tablet, Rfl: 0    pantoprazole (ProtoNix) 40 mg EC tablet, Take 1 tablet (40 mg) by mouth once daily. Do not crush, chew, or split., Disp: 30 tablet, Rfl: 0    sacubitriL-valsartan (Entresto)  mg tablet, Take 1 tablet by mouth 2 times a day., Disp: 180 tablet, Rfl: 3    sodium chloride (Ocean) 0.65 % nasal spray, Administer 1 spray into each nostril 4 times a day as needed for congestion., Disp: 44 mL, Rfl: 12    sucralfate (Carafate) 1 gram tablet, Take 1 tablet (1 g) by mouth 2 times a day before meals., Disp: , Rfl:     SUMAtriptan (Imitrex) 50 mg tablet, Take 1 tablet (50 mg) by mouth 1 time if needed for migraine., Disp: 30 tablet, Rfl: 3    torsemide (Demadex) 20 mg tablet, take 1 tablet by mouth as directed. Take once daily on dialysis days, twice daily on nondialysis days., Disp: 150 tablet, Rfl: 3    PRESENTING INFORMATION: Stacey was seem today alone. Mood was euthymic overall, with some occasional frustration and humor. Affect was mood congruent. Motivation to move forward with transplant was considered  high. She spent a considerable amount of time discussing her dialysis situation and desire to modify the setting and consolidate care. She appeared detail-oriented and focused, with some anxiety driving the behavior, but the concerns she expressed also seemed legitimate.    ADHERENCE  Medications:   -Organization: use of pillbox filled by self.   -Missed doses: never or rarely.   -Refills: Pharmacy provides medications on time.   -Use of reminders: none.    Diet:   -Type of diet followed:  renal diet.   -Barriers to following this diet: none--She said she just went through a period where everything tasted wrong. Her nephrologist told her she needs to keep her protein up. She is trying to use protein shakes to supplement.    Attendance at Appointments:   -Dialysis: hemodialysis-She's not happy with the physician supervision she has at dialysis since he only checks on her once a month. She feels he is inaccessible. She also doesn't like the center; it seems unclean; for instance, she has seen roaches crawling up the walls and one got on her chair with her. She feels that the techs aren't as professional as they could be; for instance, they complain about their jobs in front of the patients. Every time she goes to dialysis someone different is sticking her. She liked the other [facility] center she was at before. It was smaller. She noted she had extended bleeding after a recent treatment, it was the first time it had happened to her. She felt when she reported it she wasn't taken seriously.     -start date of treatment: almost three years.     -transportation to and from sessions: drives self.    -time of day: 6:00 am    -length of treatment: 3 hours    -activities during session: sleeping and watching something on TV or devices;     -missed sessions no     -Medical appointments: Yes   -Mental health: N/A   -PT/Rehabilitation: N/A    SUBSTANCE USE  Tobacco:  quit     -date of last use:  two years ago   -amount: 1/2  "PPD    Alcohol: infrequently in the past    Illicit Substance Use: no significant use, only marijuana a couple of times in the past    Substance Disorder Treatment: none reported    PSYCHOLOGICAL HISTORY   Current Mood: In the beginning she was down about her situation but \"instead of whining and crying about it, I can do what the doctors tell me and get out of here faster.\"    Previously Diagnosed Psychological Disorders:   Mood Disorders:  -Anxiety: Symptoms include Excessive worry and Difficulty controlling worry-She worries about her children and grandchildren (one going through a divorce, others with significant psychosocial stressors). As noted, she has also been worried about her dialysis situation.  Developmental/Behavioral Disorders: none formally diagnosed  Cognitive Disorders: none formally diagnosed  Personality Disorders: none formally diagnosed  Psychotic Disorders: none formally diagnosed    Treatment for Psychological Disorders:  Outpatient counseling: none  Pharmacological management:   -previous psychotropic medications used: was on something, not sure what it was, was taken off  -current psychotropic medications: none  -prescribing provider: N/A  Inpatient treatment: none    Suicidal/Homicidal Tendencies: denied    Coping Strategies: She talks through things and when upset she will express herself and move on. She came from a family where people were taught to talk it out. She cooks when she's feeling down. She does some marcus coloring.    Suicide Risk Assessment  Risk factors: chronic medical illness  Protective factors: marriage/partnered, children, and expressed desire to live    Mental Status Exam:  General Appearance: well groomed, appropriate eye contact  Attitude/Behavior: cooperative  Motor: no psychomotor agitation or retardation, no tremor or other abnormal movements  Speech: normal rate, volume, prosody  Gait/Station:  normal  Mood: euthymic   Affect: euthymic, full-range  Thought " Process: linear, goal directed  Thought Associations: no loosening of associations  Thought Content: normal  Perception: no perceptual abnormalities noted  Sensorium: alert and oriented to person, place, time and situation  Insight: intact  Judgment: intact  Cognition: cognitively intact to conversational testing with respect to attention, orientation, fund of knowledge, recent and remote memory, and language    SOCIAL HISTORY  Siblings: two brothers, four sisters and one other   Parents: , she was not close to her mother for much of her life but they became very close before her mother   Relationship Status: in a long-term relationship for 21 years.  Children: three daughters, one son  Education: GED  Occupation: not working  Employment Status: disability  Current living situation: lives with partner      IMPRESSIONS  Adherence: Ms. Heath appeared knowledgeable about her condition and is desirous of having a close, collaborative relationship with  her nephrologist. She indicated she is trying to change dialysis centers so her nephrologist here can be the person who oversees dialysis also. She is a detail-oriented person and at times might appear hyperfocused on small issues, which could have contributed to her prior frequent ED visits. In all likelihood, ongoing work with the  from Behavioral Health and consolidation of care with her  nephrologist overseeing care at dialysis will minimize her anxiety and optimize her care.    Relapse Risk Issues:  no concerns    Psychological Issues: Ms. Heath reported a low-level experience of anxiety symptoms that appear ego syntonic rather than dystonic--worrying is a part of her make up. She has demonstrated a tendency to manage aspects of her healthcare that some (or many) patients neglect and the lack of response she has gotten from some providers has been frustrating to her. As noted above, I believe a change in dialysis center and consolidating  care with her  nephrologist will minimize her distress and any confusion around how to best obtain needed treatments.     Social Support: The social support plan outlined to the transplant  appears sufficient/appropriate for successful transplantation.    PLAN  This assessment was conducted as part of a comprehensive evaluation with input provided by all members of the multidisciplinary team. Recommendations are not developed by any one team member and might be modified over time with additional care visits, input from other providers, or new test results.    Ms. Heath is appropriate for listing from a psychological perspective. Further treatment of mild anxiety symptoms does not appear warranted, though mental health counseling could be beneficial at the point when her  is not following as closely.    Maggy Heath, PhD  Clinical Psychologist, Trans[facility]  Clinical Professor, Department of Psychiatry  [facility]

## 2024-05-21 NOTE — PROGRESS NOTES
Stacey Heath  Age: 53 y.o.  MRN: [id number]  Date: 5/15/2024  Location of service: in community    Program Details  Medicaid Community Clinical Case Management  Status: Enrolled  Effective Dates: 10/17/2023 - present  Responsible Staff: PORFIRIO Valderrama      Goals Reviewed:  Problem: Kidney Issues       Goal: Improve health to get on kidney transplant list       Priority: High        Problem: Risk of Uncoordinated Care       Goal: Care will be Coordinated and Supported by a Multidisciplinary Team of Providers       Priority: High          Summary:  This writer joined patient for her nephrology appointment to provide support and additional information.  Prior to patient's nephrology appointment:  Patient discusses her family at length.  During patient's Nephrology appointment:  Patient asks about at-home hemodialysis, provider states that is very dangerous and does not recommend it at this time.   Provider increased Demadex to twice daily.  Provider will need patient to go to [facility] dialysis center.  Provider recommends holding pressure on fistula for at least 20 minutes.  This writer writes a list for patient so she knows what to discuss with her current dialysis center.  After Nephrology appointment:  Patient verbalizes her plan to talk with her current dialysis center to get things changed.    Appointment start time: 1420  Appointment completion time: 1530  Total time spent with patient (in minutes): 70      Roberta Gallego RN

## 2024-05-22 ENCOUNTER — APPOINTMENT (OUTPATIENT)
Dept: RESPIRATORY THERAPY | Facility: HOSPITAL | Age: 54
End: 2024-05-22
Payer: COMMERCIAL

## 2024-05-22 ENCOUNTER — APPOINTMENT (OUTPATIENT)
Dept: BEHAVIORAL HEALTH | Facility: CLINIC | Age: 54
End: 2024-05-22
Payer: COMMERCIAL

## 2024-05-22 ENCOUNTER — DOCUMENTATION (OUTPATIENT)
Dept: BEHAVIORAL HEALTH | Facility: CLINIC | Age: 54
End: 2024-05-22

## 2024-05-22 DIAGNOSIS — F41.9 ANXIETY: ICD-10-CM

## 2024-05-22 PROCEDURE — H2019 THER BEHAV SVC, PER 15 MIN: HCPCS | Mod: AJ,HE | Performed by: SOCIAL WORKER

## 2024-05-24 ENCOUNTER — DOCUMENTATION (OUTPATIENT)
Dept: BEHAVIORAL HEALTH | Facility: CLINIC | Age: 54
End: 2024-05-24
Payer: COMMERCIAL

## 2024-05-27 ENCOUNTER — APPOINTMENT (OUTPATIENT)
Dept: RADIOLOGY | Facility: HOSPITAL | Age: 54
End: 2024-05-27
Payer: COMMERCIAL

## 2024-05-28 ENCOUNTER — APPOINTMENT (OUTPATIENT)
Dept: RADIOLOGY | Facility: HOSPITAL | Age: 54
DRG: 304 | End: 2024-05-28
Payer: COMMERCIAL

## 2024-05-28 ENCOUNTER — HOSPITAL ENCOUNTER (INPATIENT)
Facility: HOSPITAL | Age: 54
LOS: 3 days | Discharge: HOME | DRG: 304 | End: 2024-05-31
Attending: STUDENT IN AN ORGANIZED HEALTH CARE EDUCATION/TRAINING PROGRAM | Admitting: NURSE PRACTITIONER
Payer: COMMERCIAL

## 2024-05-28 ENCOUNTER — APPOINTMENT (OUTPATIENT)
Dept: DIALYSIS | Facility: HOSPITAL | Age: 54
End: 2024-05-28
Payer: COMMERCIAL

## 2024-05-28 ENCOUNTER — CLINICAL SUPPORT (OUTPATIENT)
Dept: EMERGENCY MEDICINE | Facility: HOSPITAL | Age: 54
DRG: 304 | End: 2024-05-28
Payer: COMMERCIAL

## 2024-05-28 DIAGNOSIS — H25.13 NUCLEAR SENILE CATARACT OF BOTH EYES: ICD-10-CM

## 2024-05-28 DIAGNOSIS — R06.02 SHORTNESS OF BREATH: ICD-10-CM

## 2024-05-28 DIAGNOSIS — I50.9 ACUTE ON CHRONIC CONGESTIVE HEART FAILURE, UNSPECIFIED HEART FAILURE TYPE (MULTI): ICD-10-CM

## 2024-05-28 DIAGNOSIS — L74.9 SWEATING ABNORMALITY: ICD-10-CM

## 2024-05-28 DIAGNOSIS — Z76.82 KIDNEY TRANSPLANT CANDIDATE: ICD-10-CM

## 2024-05-28 DIAGNOSIS — L73.2 HIDRADENITIS: ICD-10-CM

## 2024-05-28 DIAGNOSIS — I31.39 OTHER PERICARDIAL EFFUSION (NONINFLAMMATORY) (HHS-HCC): ICD-10-CM

## 2024-05-28 DIAGNOSIS — F41.9 ANXIETY: ICD-10-CM

## 2024-05-28 DIAGNOSIS — Z99.2 ESRD (END STAGE RENAL DISEASE) ON DIALYSIS (MULTI): ICD-10-CM

## 2024-05-28 DIAGNOSIS — I50.1: ICD-10-CM

## 2024-05-28 DIAGNOSIS — N18.6 ESRD (END STAGE RENAL DISEASE) ON DIALYSIS (MULTI): ICD-10-CM

## 2024-05-28 DIAGNOSIS — R06.09 DYSPNEA ON EXERTION: ICD-10-CM

## 2024-05-28 DIAGNOSIS — E78.5 COMPLEX DYSLIPIDEMIA: ICD-10-CM

## 2024-05-28 DIAGNOSIS — E44.0 MALNUTRITION OF MODERATE DEGREE (MULTI): ICD-10-CM

## 2024-05-28 DIAGNOSIS — F17.200 NICOTINE USE DISORDER: ICD-10-CM

## 2024-05-28 DIAGNOSIS — I50.32 CHRONIC HEART FAILURE WITH PRESERVED EJECTION FRACTION (HFPEF) (MULTI): ICD-10-CM

## 2024-05-28 DIAGNOSIS — G62.9 NEUROPATHY: ICD-10-CM

## 2024-05-28 DIAGNOSIS — I10 ESSENTIAL HYPERTENSION: ICD-10-CM

## 2024-05-28 DIAGNOSIS — N28.9 NEPHROPATHY: ICD-10-CM

## 2024-05-28 DIAGNOSIS — K52.9 INFLAMMATION OF STOMACH AND INTESTINE: ICD-10-CM

## 2024-05-28 DIAGNOSIS — E66.9 OBESITY, CLASS I, BMI 30-34.9: ICD-10-CM

## 2024-05-28 DIAGNOSIS — Z94.9 TRANSPLANT: ICD-10-CM

## 2024-05-28 DIAGNOSIS — I16.1 HYPERTENSIVE EMERGENCY: Primary | ICD-10-CM

## 2024-05-28 DIAGNOSIS — R87.612 LOW GRADE SQUAMOUS INTRAEPITH LESION ON CYTOLOGIC SMEAR CERVIX (LGSIL): ICD-10-CM

## 2024-05-28 DIAGNOSIS — R87.612 LOW GRADE SQUAMOUS INTRAEPITHELIAL LESION ON CYTOLOGIC SMEAR OF CERVIX (LGSIL): ICD-10-CM

## 2024-05-28 DIAGNOSIS — E11.42 POLYNEUROPATHY DUE TO TYPE 2 DIABETES MELLITUS (MULTI): ICD-10-CM

## 2024-05-28 DIAGNOSIS — Z99.2 DIALYSIS PATIENT (CMS-HCC): ICD-10-CM

## 2024-05-28 DIAGNOSIS — L02.419 ABSCESS OF AXILLARY REGION: ICD-10-CM

## 2024-05-28 DIAGNOSIS — D64.9 NORMOCYTIC NORMOCHROMIC ANEMIA: ICD-10-CM

## 2024-05-28 DIAGNOSIS — I10 POORLY-CONTROLLED HYPERTENSION: ICD-10-CM

## 2024-05-28 LAB
ALBUMIN SERPL BCP-MCNC: 4.5 G/DL (ref 3.4–5)
ALP SERPL-CCNC: 103 U/L (ref 33–110)
ALT SERPL W P-5'-P-CCNC: <3 U/L (ref 7–45)
ANION GAP BLDV CALCULATED.4IONS-SCNC: 14 MMOL/L (ref 10–25)
ANION GAP BLDV CALCULATED.4IONS-SCNC: 6 MMOL/L (ref 10–25)
ANION GAP SERPL CALC-SCNC: 28 MMOL/L (ref 10–20)
APTT PPP: 22 SECONDS (ref 27–38)
AST SERPL W P-5'-P-CCNC: 38 U/L (ref 9–39)
ATRIAL RATE: 93 BPM
BASE EXCESS BLDV CALC-SCNC: 2.2 MMOL/L (ref -2–3)
BASE EXCESS BLDV CALC-SCNC: 8 MMOL/L (ref -2–3)
BASOPHILS # BLD AUTO: 0.08 X10*3/UL (ref 0–0.1)
BASOPHILS NFR BLD AUTO: 1.3 %
BILIRUB SERPL-MCNC: 0.6 MG/DL (ref 0–1.2)
BNP SERPL-MCNC: 3166 PG/ML (ref 0–99)
BODY TEMPERATURE: 37 DEGREES CELSIUS
BODY TEMPERATURE: 37 DEGREES CELSIUS
BUN SERPL-MCNC: 56 MG/DL (ref 6–23)
CA-I BLDV-SCNC: 1.08 MMOL/L (ref 1.1–1.33)
CA-I BLDV-SCNC: 1.11 MMOL/L (ref 1.1–1.33)
CALCIUM SERPL-MCNC: 9.9 MG/DL (ref 8.6–10.6)
CARDIAC TROPONIN I PNL SERPL HS: 57 NG/L (ref 0–34)
CARDIAC TROPONIN I PNL SERPL HS: 60 NG/L (ref 0–34)
CHLORIDE BLDV-SCNC: 98 MMOL/L (ref 98–107)
CHLORIDE BLDV-SCNC: 99 MMOL/L (ref 98–107)
CHLORIDE SERPL-SCNC: 93 MMOL/L (ref 98–107)
CO2 SERPL-SCNC: 21 MMOL/L (ref 21–32)
CREAT SERPL-MCNC: 12.19 MG/DL (ref 0.5–1.05)
EGFRCR SERPLBLD CKD-EPI 2021: 3 ML/MIN/1.73M*2
EOSINOPHIL # BLD AUTO: 0.62 X10*3/UL (ref 0–0.7)
EOSINOPHIL NFR BLD AUTO: 10 %
ERYTHROCYTE [DISTWIDTH] IN BLOOD BY AUTOMATED COUNT: 16.2 % (ref 11.5–14.5)
GLUCOSE BLDV-MCNC: 349 MG/DL (ref 74–99)
GLUCOSE BLDV-MCNC: 89 MG/DL (ref 74–99)
GLUCOSE SERPL-MCNC: 74 MG/DL (ref 74–99)
HCO3 BLDV-SCNC: 27.3 MMOL/L (ref 22–26)
HCO3 BLDV-SCNC: 34.2 MMOL/L (ref 22–26)
HCT VFR BLD AUTO: 39.3 % (ref 36–46)
HCT VFR BLD EST: 37 % (ref 36–46)
HCT VFR BLD EST: 41 % (ref 36–46)
HGB BLD-MCNC: 12.8 G/DL (ref 12–16)
HGB BLDV-MCNC: 12.2 G/DL (ref 12–16)
HGB BLDV-MCNC: 13.8 G/DL (ref 12–16)
IMM GRANULOCYTES # BLD AUTO: 0.01 X10*3/UL (ref 0–0.7)
IMM GRANULOCYTES NFR BLD AUTO: 0.2 % (ref 0–0.9)
INHALED O2 CONCENTRATION: 0 %
INR PPP: 1 (ref 0.9–1.1)
LACTATE BLDV-SCNC: 1.2 MMOL/L (ref 0.4–2)
LACTATE BLDV-SCNC: 1.4 MMOL/L (ref 0.4–2)
LYMPHOCYTES # BLD AUTO: 1.27 X10*3/UL (ref 1.2–4.8)
LYMPHOCYTES NFR BLD AUTO: 20.6 %
MCH RBC QN AUTO: 27.2 PG (ref 26–34)
MCHC RBC AUTO-ENTMCNC: 32.6 G/DL (ref 32–36)
MCV RBC AUTO: 84 FL (ref 80–100)
MONOCYTES # BLD AUTO: 0.3 X10*3/UL (ref 0.1–1)
MONOCYTES NFR BLD AUTO: 4.9 %
NEUTROPHILS # BLD AUTO: 3.9 X10*3/UL (ref 1.2–7.7)
NEUTROPHILS NFR BLD AUTO: 63 %
NRBC BLD-RTO: 0 /100 WBCS (ref 0–0)
OXYHGB MFR BLDV: 57.6 % (ref 45–75)
OXYHGB MFR BLDV: 79.4 % (ref 45–75)
P AXIS: 48 DEGREES
P OFFSET: 195 MS
P ONSET: 145 MS
PCO2 BLDV: 43 MM HG (ref 41–51)
PCO2 BLDV: 54 MM HG (ref 41–51)
PH BLDV: 7.41 PH (ref 7.33–7.43)
PH BLDV: 7.41 PH (ref 7.33–7.43)
PLATELET # BLD AUTO: 177 X10*3/UL (ref 150–450)
PO2 BLDV: 37 MM HG (ref 35–45)
PO2 BLDV: 56 MM HG (ref 35–45)
POTASSIUM BLDV-SCNC: 4 MMOL/L (ref 3.5–5.3)
POTASSIUM BLDV-SCNC: 4.8 MMOL/L (ref 3.5–5.3)
POTASSIUM SERPL-SCNC: 5.3 MMOL/L (ref 3.5–5.3)
PR INTERVAL: 148 MS
PROT SERPL-MCNC: 8.7 G/DL (ref 6.4–8.2)
PROTHROMBIN TIME: 10.9 SECONDS (ref 9.8–12.8)
Q ONSET: 219 MS
QRS COUNT: 15 BEATS
QRS DURATION: 76 MS
QT INTERVAL: 384 MS
QTC CALCULATION(BAZETT): 477 MS
QTC FREDERICIA: 444 MS
R AXIS: 20 DEGREES
RBC # BLD AUTO: 4.7 X10*6/UL (ref 4–5.2)
SAO2 % BLDV: 59 % (ref 45–75)
SAO2 % BLDV: 81 % (ref 45–75)
SARS-COV-2 RNA RESP QL NAA+PROBE: NOT DETECTED
SODIUM BLDV-SCNC: 134 MMOL/L (ref 136–145)
SODIUM BLDV-SCNC: 135 MMOL/L (ref 136–145)
SODIUM SERPL-SCNC: 137 MMOL/L (ref 136–145)
T AXIS: 174 DEGREES
T OFFSET: 411 MS
VENTRICULAR RATE: 93 BPM
WBC # BLD AUTO: 6.2 X10*3/UL (ref 4.4–11.3)

## 2024-05-28 PROCEDURE — 36415 COLL VENOUS BLD VENIPUNCTURE: CPT | Performed by: STUDENT IN AN ORGANIZED HEALTH CARE EDUCATION/TRAINING PROGRAM

## 2024-05-28 PROCEDURE — 71045 X-RAY EXAM CHEST 1 VIEW: CPT

## 2024-05-28 PROCEDURE — 2500000004 HC RX 250 GENERAL PHARMACY W/ HCPCS (ALT 636 FOR OP/ED)

## 2024-05-28 PROCEDURE — 36415 COLL VENOUS BLD VENIPUNCTURE: CPT

## 2024-05-28 PROCEDURE — 8010000001 HC DIALYSIS - HEMODIALYSIS PER DAY

## 2024-05-28 PROCEDURE — 70450 CT HEAD/BRAIN W/O DYE: CPT | Performed by: RADIOLOGY

## 2024-05-28 PROCEDURE — 2500000002 HC RX 250 W HCPCS SELF ADMINISTERED DRUGS (ALT 637 FOR MEDICARE OP, ALT 636 FOR OP/ED): Mod: MUE

## 2024-05-28 PROCEDURE — 74177 CT ABD & PELVIS W/CONTRAST: CPT

## 2024-05-28 PROCEDURE — 71275 CT ANGIOGRAPHY CHEST: CPT | Performed by: STUDENT IN AN ORGANIZED HEALTH CARE EDUCATION/TRAINING PROGRAM

## 2024-05-28 PROCEDURE — 85610 PROTHROMBIN TIME: CPT | Performed by: STUDENT IN AN ORGANIZED HEALTH CARE EDUCATION/TRAINING PROGRAM

## 2024-05-28 PROCEDURE — 99285 EMERGENCY DEPT VISIT HI MDM: CPT

## 2024-05-28 PROCEDURE — 71275 CT ANGIOGRAPHY CHEST: CPT

## 2024-05-28 PROCEDURE — 84132 ASSAY OF SERUM POTASSIUM: CPT | Mod: 91

## 2024-05-28 PROCEDURE — 71045 X-RAY EXAM CHEST 1 VIEW: CPT | Performed by: RADIOLOGY

## 2024-05-28 PROCEDURE — 2550000001 HC RX 255 CONTRASTS: Performed by: EMERGENCY MEDICINE

## 2024-05-28 PROCEDURE — 87635 SARS-COV-2 COVID-19 AMP PRB: CPT

## 2024-05-28 PROCEDURE — 93005 ELECTROCARDIOGRAM TRACING: CPT

## 2024-05-28 PROCEDURE — 74177 CT ABD & PELVIS W/CONTRAST: CPT | Performed by: STUDENT IN AN ORGANIZED HEALTH CARE EDUCATION/TRAINING PROGRAM

## 2024-05-28 PROCEDURE — 96375 TX/PRO/DX INJ NEW DRUG ADDON: CPT

## 2024-05-28 PROCEDURE — 2500000001 HC RX 250 WO HCPCS SELF ADMINISTERED DRUGS (ALT 637 FOR MEDICARE OP)

## 2024-05-28 PROCEDURE — 96376 TX/PRO/DX INJ SAME DRUG ADON: CPT

## 2024-05-28 PROCEDURE — 70450 CT HEAD/BRAIN W/O DYE: CPT

## 2024-05-28 PROCEDURE — 94640 AIRWAY INHALATION TREATMENT: CPT | Mod: 59

## 2024-05-28 PROCEDURE — 99223 1ST HOSP IP/OBS HIGH 75: CPT | Performed by: NURSE PRACTITIONER

## 2024-05-28 PROCEDURE — 85730 THROMBOPLASTIN TIME PARTIAL: CPT | Performed by: STUDENT IN AN ORGANIZED HEALTH CARE EDUCATION/TRAINING PROGRAM

## 2024-05-28 PROCEDURE — 85025 COMPLETE CBC W/AUTO DIFF WBC: CPT

## 2024-05-28 PROCEDURE — 83880 ASSAY OF NATRIURETIC PEPTIDE: CPT

## 2024-05-28 PROCEDURE — 5A1D70Z PERFORMANCE OF URINARY FILTRATION, INTERMITTENT, LESS THAN 6 HOURS PER DAY: ICD-10-PCS | Performed by: NURSE PRACTITIONER

## 2024-05-28 PROCEDURE — 93010 ELECTROCARDIOGRAM REPORT: CPT | Performed by: STUDENT IN AN ORGANIZED HEALTH CARE EDUCATION/TRAINING PROGRAM

## 2024-05-28 PROCEDURE — 1200000002 HC GENERAL ROOM WITH TELEMETRY DAILY

## 2024-05-28 PROCEDURE — 96374 THER/PROPH/DIAG INJ IV PUSH: CPT

## 2024-05-28 PROCEDURE — 84484 ASSAY OF TROPONIN QUANT: CPT | Performed by: STUDENT IN AN ORGANIZED HEALTH CARE EDUCATION/TRAINING PROGRAM

## 2024-05-28 PROCEDURE — 2500000001 HC RX 250 WO HCPCS SELF ADMINISTERED DRUGS (ALT 637 FOR MEDICARE OP): Performed by: STUDENT IN AN ORGANIZED HEALTH CARE EDUCATION/TRAINING PROGRAM

## 2024-05-28 PROCEDURE — 99285 EMERGENCY DEPT VISIT HI MDM: CPT | Performed by: STUDENT IN AN ORGANIZED HEALTH CARE EDUCATION/TRAINING PROGRAM

## 2024-05-28 RX ORDER — ASPIRIN 81 MG/1
81 TABLET ORAL DAILY
Status: DISCONTINUED | OUTPATIENT
Start: 2024-05-29 | End: 2024-05-31 | Stop reason: HOSPADM

## 2024-05-28 RX ORDER — CLONIDINE HYDROCHLORIDE 0.1 MG/1
0.2 TABLET ORAL EVERY 8 HOURS SCHEDULED
Status: DISCONTINUED | OUTPATIENT
Start: 2024-05-28 | End: 2024-05-31 | Stop reason: HOSPADM

## 2024-05-28 RX ORDER — MORPHINE SULFATE 4 MG/ML
4 INJECTION INTRAVENOUS ONCE
Status: COMPLETED | OUTPATIENT
Start: 2024-05-28 | End: 2024-05-28

## 2024-05-28 RX ORDER — METOCLOPRAMIDE HYDROCHLORIDE 5 MG/ML
10 INJECTION INTRAMUSCULAR; INTRAVENOUS ONCE AS NEEDED
Status: DISCONTINUED | OUTPATIENT
Start: 2024-05-28 | End: 2024-05-31 | Stop reason: HOSPADM

## 2024-05-28 RX ORDER — DIPHENHYDRAMINE HYDROCHLORIDE 50 MG/ML
50 INJECTION INTRAMUSCULAR; INTRAVENOUS ONCE
Status: COMPLETED | OUTPATIENT
Start: 2024-05-28 | End: 2024-05-28

## 2024-05-28 RX ORDER — ISOSORBIDE MONONITRATE 30 MG/1
120 TABLET, EXTENDED RELEASE ORAL DAILY
Status: DISCONTINUED | OUTPATIENT
Start: 2024-05-29 | End: 2024-05-31 | Stop reason: HOSPADM

## 2024-05-28 RX ORDER — NIFEDIPINE 30 MG/1
90 TABLET, FILM COATED, EXTENDED RELEASE ORAL ONCE
Qty: 3 TABLET | Refills: 0 | Status: COMPLETED | OUTPATIENT
Start: 2024-05-28 | End: 2024-05-28

## 2024-05-28 RX ORDER — ACETAMINOPHEN 325 MG/1
650 TABLET ORAL EVERY 4 HOURS PRN
Status: DISCONTINUED | OUTPATIENT
Start: 2024-05-28 | End: 2024-05-31

## 2024-05-28 RX ORDER — CARVEDILOL 12.5 MG/1
50 TABLET ORAL 2 TIMES DAILY
Status: DISCONTINUED | OUTPATIENT
Start: 2024-05-28 | End: 2024-05-31 | Stop reason: HOSPADM

## 2024-05-28 RX ORDER — POLYETHYLENE GLYCOL 3350 17 G/17G
17 POWDER, FOR SOLUTION ORAL DAILY PRN
Status: DISCONTINUED | OUTPATIENT
Start: 2024-05-28 | End: 2024-05-31 | Stop reason: HOSPADM

## 2024-05-28 RX ORDER — ASPIRIN 325 MG
325 TABLET ORAL ONCE
Status: COMPLETED | OUTPATIENT
Start: 2024-05-28 | End: 2024-05-28

## 2024-05-28 RX ORDER — MULTIVIT-MIN/IRON FUM/FOLIC AC 7.5 MG-4
1 TABLET ORAL
Status: DISCONTINUED | OUTPATIENT
Start: 2024-05-29 | End: 2024-05-31 | Stop reason: HOSPADM

## 2024-05-28 RX ORDER — ACETAMINOPHEN 650 MG/1
650 SUPPOSITORY RECTAL EVERY 4 HOURS PRN
Status: DISCONTINUED | OUTPATIENT
Start: 2024-05-28 | End: 2024-05-31

## 2024-05-28 RX ORDER — SUCRALFATE 1 G/1
1 TABLET ORAL
Status: DISCONTINUED | OUTPATIENT
Start: 2024-05-29 | End: 2024-05-31 | Stop reason: HOSPADM

## 2024-05-28 RX ORDER — ACETAMINOPHEN 500 MG
10 TABLET ORAL NIGHTLY PRN
Status: DISCONTINUED | OUTPATIENT
Start: 2024-05-28 | End: 2024-05-31 | Stop reason: HOSPADM

## 2024-05-28 RX ORDER — KETOROLAC TROMETHAMINE 15 MG/ML
15 INJECTION, SOLUTION INTRAMUSCULAR; INTRAVENOUS ONCE
Status: COMPLETED | OUTPATIENT
Start: 2024-05-28 | End: 2024-05-28

## 2024-05-28 RX ORDER — NIFEDIPINE 90 MG/1
90 TABLET, EXTENDED RELEASE ORAL
Status: DISCONTINUED | OUTPATIENT
Start: 2024-05-29 | End: 2024-05-31 | Stop reason: HOSPADM

## 2024-05-28 RX ORDER — ACETAMINOPHEN 160 MG/5ML
650 SOLUTION ORAL EVERY 4 HOURS PRN
Status: DISCONTINUED | OUTPATIENT
Start: 2024-05-28 | End: 2024-05-31

## 2024-05-28 RX ORDER — ALBUTEROL SULFATE 0.83 MG/ML
2.5 SOLUTION RESPIRATORY (INHALATION) ONCE
Status: COMPLETED | OUTPATIENT
Start: 2024-05-28 | End: 2024-05-28

## 2024-05-28 RX ORDER — HYDRALAZINE HYDROCHLORIDE 50 MG/1
100 TABLET, FILM COATED ORAL 3 TIMES DAILY
Status: DISCONTINUED | OUTPATIENT
Start: 2024-05-28 | End: 2024-05-31 | Stop reason: HOSPADM

## 2024-05-28 RX ORDER — ONDANSETRON HYDROCHLORIDE 2 MG/ML
4 INJECTION, SOLUTION INTRAVENOUS ONCE
Status: COMPLETED | OUTPATIENT
Start: 2024-05-28 | End: 2024-05-28

## 2024-05-28 RX ORDER — PANTOPRAZOLE SODIUM 40 MG/1
40 TABLET, DELAYED RELEASE ORAL DAILY
Status: DISCONTINUED | OUTPATIENT
Start: 2024-05-29 | End: 2024-05-31

## 2024-05-28 RX ORDER — TORSEMIDE 20 MG/1
20 TABLET ORAL DAILY
Status: DISCONTINUED | OUTPATIENT
Start: 2024-05-29 | End: 2024-05-31 | Stop reason: HOSPADM

## 2024-05-28 RX ORDER — BUMETANIDE 0.25 MG/ML
2 INJECTION INTRAMUSCULAR; INTRAVENOUS ONCE
Status: COMPLETED | OUTPATIENT
Start: 2024-05-28 | End: 2024-05-28

## 2024-05-28 RX ORDER — HYDRALAZINE HYDROCHLORIDE 20 MG/ML
20 INJECTION INTRAMUSCULAR; INTRAVENOUS ONCE
Status: DISCONTINUED | OUTPATIENT
Start: 2024-05-28 | End: 2024-05-28

## 2024-05-28 RX ORDER — HYDRALAZINE HYDROCHLORIDE 20 MG/ML
10 INJECTION INTRAMUSCULAR; INTRAVENOUS EVERY 6 HOURS PRN
Status: DISCONTINUED | OUTPATIENT
Start: 2024-05-28 | End: 2024-05-31 | Stop reason: HOSPADM

## 2024-05-28 RX ORDER — AMLODIPINE BESYLATE 10 MG/1
10 TABLET ORAL DAILY
Status: DISCONTINUED | OUTPATIENT
Start: 2024-05-29 | End: 2024-05-31 | Stop reason: HOSPADM

## 2024-05-28 RX ORDER — ISOSORBIDE MONONITRATE 30 MG/1
120 TABLET, EXTENDED RELEASE ORAL ONCE
Qty: 4 TABLET | Refills: 0 | Status: COMPLETED | OUTPATIENT
Start: 2024-05-28 | End: 2024-05-28

## 2024-05-28 RX ORDER — GABAPENTIN 300 MG/1
300 CAPSULE ORAL NIGHTLY
Status: DISCONTINUED | OUTPATIENT
Start: 2024-05-28 | End: 2024-05-31 | Stop reason: HOSPADM

## 2024-05-28 RX ORDER — HYDROXYZINE HYDROCHLORIDE 10 MG/1
10 TABLET, FILM COATED ORAL EVERY 6 HOURS PRN
Status: DISCONTINUED | OUTPATIENT
Start: 2024-05-28 | End: 2024-05-31 | Stop reason: HOSPADM

## 2024-05-28 RX ORDER — FLUTICASONE FUROATE AND VILANTEROL 100; 25 UG/1; UG/1
1 POWDER RESPIRATORY (INHALATION)
Status: DISCONTINUED | OUTPATIENT
Start: 2024-05-29 | End: 2024-05-31 | Stop reason: HOSPADM

## 2024-05-28 RX ORDER — HYDRALAZINE HYDROCHLORIDE 20 MG/ML
10 INJECTION INTRAMUSCULAR; INTRAVENOUS ONCE
Status: COMPLETED | OUTPATIENT
Start: 2024-05-28 | End: 2024-05-28

## 2024-05-28 RX ORDER — ATORVASTATIN CALCIUM 80 MG/1
80 TABLET, FILM COATED ORAL NIGHTLY
Status: DISCONTINUED | OUTPATIENT
Start: 2024-05-28 | End: 2024-05-31 | Stop reason: HOSPADM

## 2024-05-28 RX ADMIN — HYDRALAZINE HYDROCHLORIDE 100 MG: 25 TABLET ORAL at 16:49

## 2024-05-28 RX ADMIN — CLONIDINE HYDROCHLORIDE 0.2 MG: 0.1 TABLET ORAL at 21:28

## 2024-05-28 RX ADMIN — IOHEXOL 80 ML: 350 INJECTION, SOLUTION INTRAVENOUS at 17:28

## 2024-05-28 RX ADMIN — ISOSORBIDE MONONITRATE 120 MG: 30 TABLET, EXTENDED RELEASE ORAL at 12:13

## 2024-05-28 RX ADMIN — DIPHENHYDRAMINE HYDROCHLORIDE 50 MG: 50 INJECTION INTRAMUSCULAR; INTRAVENOUS at 15:54

## 2024-05-28 RX ADMIN — HYDRALAZINE HYDROCHLORIDE 100 MG: 25 TABLET ORAL at 21:28

## 2024-05-28 RX ADMIN — CLONIDINE HYDROCHLORIDE 0.2 MG: 0.1 TABLET ORAL at 16:49

## 2024-05-28 RX ADMIN — ONDANSETRON 4 MG: 2 INJECTION INTRAMUSCULAR; INTRAVENOUS at 12:09

## 2024-05-28 RX ADMIN — ASPIRIN 325 MG ORAL TABLET 325 MG: 325 PILL ORAL at 13:34

## 2024-05-28 RX ADMIN — METHYLPREDNISOLONE SODIUM SUCCINATE 40 MG: 125 INJECTION, POWDER, FOR SOLUTION INTRAMUSCULAR; INTRAVENOUS at 15:54

## 2024-05-28 RX ADMIN — KETOROLAC TROMETHAMINE 15 MG: 15 INJECTION, SOLUTION INTRAMUSCULAR; INTRAVENOUS at 12:09

## 2024-05-28 RX ADMIN — HYDRALAZINE HYDROCHLORIDE 10 MG: 20 INJECTION INTRAMUSCULAR; INTRAVENOUS at 12:08

## 2024-05-28 RX ADMIN — BUMETANIDE 2 MG: 0.25 INJECTION INTRAMUSCULAR; INTRAVENOUS at 21:33

## 2024-05-28 RX ADMIN — MORPHINE SULFATE 4 MG: 4 INJECTION, SOLUTION INTRAMUSCULAR; INTRAVENOUS at 16:49

## 2024-05-28 RX ADMIN — ALBUTEROL SULFATE 2.5 MG: 2.5 SOLUTION RESPIRATORY (INHALATION) at 16:49

## 2024-05-28 RX ADMIN — MORPHINE SULFATE 4 MG: 4 INJECTION, SOLUTION INTRAMUSCULAR; INTRAVENOUS at 21:59

## 2024-05-28 RX ADMIN — NIFEDIPINE 90 MG: 30 TABLET, FILM COATED, EXTENDED RELEASE ORAL at 13:34

## 2024-05-28 RX ADMIN — METHYLPREDNISOLONE SODIUM SUCCINATE 40 MG: 125 INJECTION, POWDER, FOR SOLUTION INTRAMUSCULAR; INTRAVENOUS at 12:09

## 2024-05-28 ASSESSMENT — COLUMBIA-SUICIDE SEVERITY RATING SCALE - C-SSRS
6. HAVE YOU EVER DONE ANYTHING, STARTED TO DO ANYTHING, OR PREPARED TO DO ANYTHING TO END YOUR LIFE?: NO
1. IN THE PAST MONTH, HAVE YOU WISHED YOU WERE DEAD OR WISHED YOU COULD GO TO SLEEP AND NOT WAKE UP?: NO
2. HAVE YOU ACTUALLY HAD ANY THOUGHTS OF KILLING YOURSELF?: NO

## 2024-05-28 ASSESSMENT — ENCOUNTER SYMPTOMS
VOMITING: 1
CONSTIPATION: 0
PALPITATIONS: 0
NAUSEA: 1
HEMATURIA: 0
DIARRHEA: 0
FREQUENCY: 0
FEVER: 0
FLANK PAIN: 0
CHILLS: 0
HEADACHES: 1
ABDOMINAL PAIN: 0
SHORTNESS OF BREATH: 1
DYSURIA: 0

## 2024-05-28 ASSESSMENT — PAIN SCALES - GENERAL
PAINLEVEL_OUTOF10: 0 - NO PAIN
PAINLEVEL_OUTOF10: 8
PAINLEVEL_OUTOF10: 6

## 2024-05-28 ASSESSMENT — LIFESTYLE VARIABLES
HAVE PEOPLE ANNOYED YOU BY CRITICIZING YOUR DRINKING: NO
EVER FELT BAD OR GUILTY ABOUT YOUR DRINKING: NO
TOTAL SCORE: 0
HAVE YOU EVER FELT YOU SHOULD CUT DOWN ON YOUR DRINKING: NO
EVER HAD A DRINK FIRST THING IN THE MORNING TO STEADY YOUR NERVES TO GET RID OF A HANGOVER: NO

## 2024-05-28 ASSESSMENT — PAIN - FUNCTIONAL ASSESSMENT
PAIN_FUNCTIONAL_ASSESSMENT: NO/DENIES PAIN
PAIN_FUNCTIONAL_ASSESSMENT: 0-10

## 2024-05-28 ASSESSMENT — PAIN DESCRIPTION - LOCATION: LOCATION: BACK

## 2024-05-28 NOTE — NURSING NOTE
.Report from Sending RN:    Report From: PONCHO Wiggins  Recent Surgery of Procedure: No  Baseline Level of Consciousness (LOC): A&OX 3  Oxygen Use: YES  Type: 2L NC  Diabetic: No  Last BP Med Given Day of Dialysis: Hydralazine, nifedipine  Last Pain Med Given: Kotorolac 15 mg  Lab Tests to be Obtained with Dialysis: No  Blood Transfusion to be Given During Dialysis: No  Available IV Access: Yes  Medications to be Administered During Dialysis: No  Continuous IV Infusion Running: No  Restraints on Currently or in the Last 24 Hours: No  Hand-Off Communication: Pt with the hx of chest pain. BP in 200's at the ED. BP medication given, not on precaution, BP now in the 180's and per Dr. Allison pt can come to the HD unit after CT today.  Dialysis Catheter Dressing: AVF  Last Dressing Change: N/A

## 2024-05-28 NOTE — PROGRESS NOTES
"Stacey Heath  Age: 53 y.o.  MRN: [id number]  Date: 5/28/2024  Location of service: in community    Program Details  Medicaid Community Clinical Case Management  Status: Enrolled  Effective Dates: 10/17/2023 - present  Responsible Staff: Ludmila [facility]      Goals Reviewed:  Problem: Anxiety       Goal: Attempts to manage anxiety with help       Priority: High         Goal: Verbalizes ways to manage anxiety       Priority: High         Goal: Implements measures to reduce anxiety       Priority: High        Problem: Financial Stressors       Goal: Assistance with financial concerns         Problem: Kidney Issues       Goal: Improve health to get on kidney transplant list       Priority: High        Problem: Negative Experience, Conflict with, or Distrust of Providers and/or Health System       Goal: Plan to Address Patient Specific Negative Experience, Distrust, or Conflict with Providers and/or Health System       Priority: High        Problem: Risk of Uncoordinated Care       Goal: Care will be Coordinated and Supported by a Multidisciplinary Team of Providers       Priority: High          Summary:  Provider met with patient at her home. Provider utilized empathic listening to facilitate a dsicussion with patient about how she is currently doing. Patient reports doing \"well\" and is working on staying optimistic about her health goals. Provider spoke with patient about how patient will be need to transferred to a new [facility] due to current provider's change in roles. Provider and patient met with [facility] Ludmila [facility] via tele-health. Provider facilitate a discussion with patient and [facility] and discussed goals patient has been working on in the Personalized Care Program and what goals patient would like to work on moving forward with [facility].     Appointment start time: 1105  Appointment completion time: 1205  Total time spent with patient (in minutes): Jeremías HARRISON" PORFIRIO [place]

## 2024-05-28 NOTE — ED TRIAGE NOTES
ESRD on HD Tues Thurs Sat, with last full treatment being Saturday. Pt was HTN at dialysis center and given amlodipine and clonidine with pressure not coming down. Pt is SOB, N/V/D, chest pain, and head ache. Pt endorses blood thinner use.

## 2024-05-28 NOTE — PROGRESS NOTES
tSacey Heath  Age: 53 y.o.  MRN: [id number]  Date: 5/24/2024  Location of service: phone call    Program Details  Medicaid Community Clinical Case Management  Status: Enrolled  Effective Dates: 10/17/2023 - present  Responsible Staff: NAREN Davidson      Goals Reviewed:      Summary:  This writer calls patient to cancel our appointment today and let patient know that this writer will be attending her gastro appointment next week. Patient is unable to answer at this time and patient's phone will not allow this writer to leave a voicemail.  This writer calls patient to cancel our appointment today and let patient know that this writer will be attending her gastro appointment next week. Patient is unable to answer at this time and patient's phone will not allow this writer to leave a voicemail.    Appointment start time: 0830  Appointment completion time: 0834  Total time spent with patient (in minutes): 4      Roberta Gallego RN

## 2024-05-28 NOTE — ED PROVIDER NOTES
CC: Shortness of Breath     HPI: Patient is a 52 yo female with hx of hypertension, diabetes, HFpEF (65% with lVH), COPD (1-2L NC baseline), ESRD (T/Th/Sat) currently undergoing workup for kidney transplant, presenting to the emergency department today for shortness of breath. Has had multiple visits to the ED recently for hypertensive emergency. Was at her dialysis session earlier today and was noted to be hypertensive to the 200s.  Was given amlodipine and clonidine without much improvement of her blood pressure. Reports chest pain and headache at that time as well.  Was sent to  emergency department for further evaluation of hypertensive emergency.  On arrival, patient appears otherwise clinically well.  Endorses mild substernal chest pain and shortness of breath.  Reports has been compliant with her home Xarelto.  Denies fevers, chills, abdominal pain, or changes in urination/stool for us today.      Records Reviewed: Recent available ED and inpatient notes reviewed in EMR.    PMHx/PSHx:  Per HPI.   - has a past medical history of Acute pancreatitis ([facility]), Acute pyelonephritis due to bacteria, Acute renal failure (CMS-HCC), YARA (acute kidney injury) (CMS-HCC), Bilateral shoulder pain, Breast pain, Cardiac/pericardial tamponade ([facility]), Chronic renal failure syndrome, Community acquired pneumonia, Dependence on renal dialysis (CMS-HCC), Disorder of sweat glands, Dry eye syndrome of bilateral lacrimal glands, Essential (primary) hypertension, Flash pulmonary edema (Multi), History of acute pancreatitis, Low grade squamous intraepithelial lesion (LGSIL) on cervicovaginal cytologic smear, Migraine headache, Migraines, Obstruction of urinary stent (CMS-HCC), 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, Other conditions influencing health status, Pain in upper limb, Periorbital cellulitis, Pleural effusion, Pneumonia, Sepsis (Multi), Status migrainosus, Type 2 myocardial infarction (Multi), Unspecified diastolic  (congestive) heart failure (Multi), and Vaginal dryness.  - has a past surgical history that includes Tubal ligation (03/07/2017); Other surgical history (03/07/2017); Appendectomy (03/07/2017); Other surgical history (03/07/2017); Other surgical history (12/08/2021); Other surgical history (12/08/2021); and CT angio abdomen w and or wo IV IV contrast (4/23/2023).  - has Abscess of axillary region; Anxiety; Astigmatism; Carotid bruit; Chronic migraine with aura; Congenital cataract; Diabetes mellitus type 2, uncomplicated (Multi); Inflammation of stomach and intestine; Coronary artery disease involving native coronary artery; Dialysis patient (CMS-HCC); Nephropathy; Essential hypertension; Iron deficiency anemia; Low grade squamous intraepith lesion on cytologic smear cervix (lgsil); Low grade squamous intraepithelial lesion on cytologic smear of cervix (LGSIL); Dialysis AV fistula malfunction (CMS-HCC); Migraine; Neuropathy; Nicotine use disorder; Other pericardial effusion (noninflammatory) ([facility]); Cough, unspecified; Dyspnea on exertion; Shoulder pain, bilateral; Sweating abnormality; Transplant; Acute pulmonary edema with heart disease (Multi); Respiratory failure (Multi); Poorly-controlled hypertension; Polyneuropathy due to type 2 diabetes mellitus (Multi); Obesity, Class I, BMI 30-34.9; Nuclear senile cataract of both eyes; Normocytic normochromic anemia; Low back pain; Hidradenitis; Complex dyslipidemia; Chronic heart failure with preserved ejection fraction (HFpEF) (Multi); ESRD (end stage renal disease) on dialysis (Multi); Hypervolemia; ERRONEOUS ENCOUNTER--DISREGARD; Malnutrition of moderate degree (Multi); Shortness of breath; and Hypertensive emergency on their problem list.    Medications:  Current Outpatient Medications   Medication Instructions    albuterol (Ventolin HFA) 90 mcg/actuation inhaler 2 puffs, inhalation, 2 times daily    albuterol 1.25 mg, nebulization, Every 6 hours PRN     amLODIPine (Norvasc) 10 mg tablet TAKE 1 TABLET BY MOUTH ONCE DAILY BEFORE DIALYSIS    apixaban (ELIQUIS) 5 mg, oral, 2 times daily    apixaban (ELIQUIS) 5 mg, oral, 2 times daily    aspirin 81 mg, oral, Daily    atorvastatin (LIPITOR) 80 mg, oral, Nightly    carvedilol (COREG) 50 mg, oral, 2 times daily    cloNIDine (CATAPRES) 0.2 mg, oral, Every 8 hours scheduled    epoetin joceline (EPOGEN,PROCRIT) 10,000 Units, subcutaneous, 3 times weekly    fluticasone (Flonase) 50 mcg/actuation nasal spray 2 sprays, Each Nostril, Daily, Shake gently. Before first use, prime pump. After use, clean tip and replace cap.    fluticasone propion-salmeteroL (Advair Diskus) 100-50 mcg/dose diskus inhaler 1 puff, inhalation, Daily    gabapentin (NEURONTIN) 300 mg, oral, Nightly    hydrALAZINE (APRESOLINE) 100 mg, oral, 3 times daily    hydrOXYzine HCL (ATARAX) 10 mg, oral, Every 6 hours PRN    isosorbide mononitrate ER (IMDUR) 120 mg, oral, Daily, Do not crush or chew.    lidocaine (Lidoderm) 5 % patch 1 patch, transdermal, Daily, Remove & discard patch within 12 hours or as directed by MD.    metoclopramide (Reglan) 5 mg tablet TAKE 1 TABLET BY MOUTH EVERY 6 HOURS AS NEEDED    NIFEdipine ER (ADALAT CC) 90 mg, oral, Daily before breakfast, Do not crush, chew, or split.    nutritional drink (Ensure) liquid 118 mL, oral, 2 times daily    ondansetron (ZOFRAN) 4 mg, oral, Every 8 hours PRN    pantoprazole (PROTONIX) 40 mg, oral, Daily, Do not crush, chew, or split.    sacubitriL-valsartan (Entresto)  mg tablet 1 tablet, oral, 2 times daily    sodium chloride (Ocean) 0.65 % nasal spray 1 spray, Each Nostril, 4 times daily PRN    SUMAtriptan (IMITREX) 50 mg, oral, Once as needed    torsemide (Demadex) 20 mg tablet take 1 tablet by mouth as directed. Take once daily on dialysis days, twice daily on nondialysis days.        Allergies:  Iodine, Bioflavonoids, Codeine, Flowers, Hydromorphone, and Shellfish containing products    Social  History:  - Tobacco:  reports that she has quit smoking. Her smoking use included cigarettes. She has never used smokeless tobacco.   - Alcohol:  reports that she does not currently use alcohol.   - Illicit Drugs:  reports no history of drug use.     ROS:  Per HPI.     ???????????????????????????????????????????????????????????????  Triage Vitals:  T 36.7 °C (98.1 °F)  HR 84  BP (!) 205/94  RR 19  O2 (!) 86 % None (Room air)    Physical Exam  Constitutional:       Appearance: Normal appearance.   HENT:      Head: Normocephalic and atraumatic.   Cardiovascular:      Rate and Rhythm: Normal rate and regular rhythm.      Heart sounds: Normal heart sounds.   Pulmonary:      Effort: Tachypnea present.      Breath sounds: Examination of the right-lower field reveals decreased breath sounds and rhonchi. Examination of the left-lower field reveals decreased breath sounds and rhonchi. Decreased breath sounds and rhonchi present.   Abdominal:      Palpations: Abdomen is soft.      Tenderness: There is no abdominal tenderness. There is no guarding or rebound.   Musculoskeletal:         General: Normal range of motion.      Comments: No bilateral lower extremity edema.   Skin:     General: Skin is warm.   Neurological:      General: No focal deficit present.      Mental Status: She is alert and oriented to person, place, and time.               Monster Coma Scale Score: 15                  ???????????????????????????????????????????????????????????????  EKG: Regular rate, regular rhythm.  VA, QRS, QTc intervals within normal limits.  Notable T wave inversions in leads I 2, aVL.  T wave versions also seen in V5 and V6 (all unchanged from previous. LVH  in the anterior lateral leads.  Questionable ST elevation seen in lead V3.  EKG relatively unchanged compared to previous.    Medical Decision Making   Patient is a 53-year-old female presenting to the emergency department today for shortness of breath.  On arrival, blood  pressure 200s over 100.  Rest of vitals within normal limits, afebrile for us.  On examination, patient appears chronically ill but otherwise no acute distress.  Bilateral crackles heard to the lower lung fields.  Given her hypertensive episode and missed dialysis today I think she likely has fluid overload.  Chest x-ray showed evidence of acute pulmonary edema.  Blood pressure acutely lowered with home medications of hydralazine, lisinopril, and amlodipine.  Labs notable for a creatinine of 12.19 consistent with patient's known ESRD.  BNP elevated at 3100 consistent with known CHF.  Troponin elevated 60, repeat stable. EKG showed no evidence of acute coronary ischemia.  Lower concern for acute cardiac process at this time.  CBC relatively unremarkable, lower concern for an occult infection.  Given patient's age, comorbidities, and plan for outpatient scan tomorrow, we did get a CT chest abdomen pelvis. She needed a 4-hour contrast prep for this given her allergy to iodine.  Patient will be prepped and ready for dialysis this evening.  Will likely need admission today for ongoing nausea, vomiting, and abdominal pain.  Patient signed out to oncoming provider with CT scan, and dialysis pending.        ED Course as of 05/29/24 1618   Tue May 28, 2024   1010 53-year-old female with past medical history of ESRD on hemodialysis Tuesday Thursday Saturday not on home oxygen and currently being evaluated for transplant, anxiety, migraines, type 2 diabetes mellitus, CAD, chronic anemia, HFpEF here for 4 days shortness of breath, cough and congestion.  Sent here from dialysis due to hypertension not controlled by her home medications.  She states that at the end of coughing spells she has episodes of nonbloody nonbilious emesis.  She also notes a few episodes of nonbloody diarrhea.  She presents hypoxic, but is compliant with her Eliquis.  Her lungs are clear to auscultation lowering suspicion for heart exacerbation but states  she is getting worked up for COPD, her exam is not consistent with COPD exacerbation.  She is tender over the lower abdomen, and given her symptoms, will obtain CT PE CT abdomen and pelvis, labs and urinalysis for further assessment.  Given the new hypoxia, will admit regardless of workup.  Patient notes understanding. [ND]   1628 Patient was signed out to me at 1500.  On my evaluation, noting nausea after eating even after treatment with Zofran, will give Reglan send he takes Reglan at home for nausea. On 4L nc. [AD]   1629 Noting headache, with elevated blood pressure, normal neurological examination, will perform CT head in addition to imaging ordered.  Will give midday blood pressure medications, and will order additional pain medication. [AD]   1830 @dialysis  [AD]   2048 Returned from dialysis, only able to complete a partial session due to muscle cramping. [AD]   2057 On reevaluation patient taken off oxygen, and was 89% on room air, replaced oxygen, no 93% on 6L nasal cannula. [AD]   2059 BNP(!): 3,166  Will give torsemide as patient still makes some urine. [AD]      ED Course User Index  [AD] Donna Dunn DO  [ND] Harrison Argueta MD         Diagnoses as of 05/29/24 1618   Shortness of breath   Acute on chronic congestive heart failure, unspecified heart failure type (Multi)   Hypertensive emergency       Social Determinants Limiting Care:  None identified    Disposition:   Pending on signout    Shawn Jasmine MD  Emergency Medicine PGY2      Procedures ? SmartLinks last updated 5/29/2024 4:18 PM        Shawn Jasmine MD  Resident  05/29/24 1618

## 2024-05-28 NOTE — PROGRESS NOTES
Emergency Medicine Transition of Care Note.      I received Stacey Heath in signout from Dr. Jasmine.  Please see the previous ED provider note for all HPI, PE and MDM up to the time of signout at 1500. This is in addition to the primary record. In brief Stacey Heath is an 53 y.o. female presenting for   Chief Complaint   Patient presents with    Shortness of Breath    .      At the time of signout we were awaiting: CT PE and abdomen/pelvis, and likely admission    ED Course as of 05/28/24 2238   Tue May 28, 2024   1010 53-year-old female with past medical history of ESRD on hemodialysis Tuesday Thursday Saturday not on home oxygen and currently being evaluated for transplant, anxiety, migraines, type 2 diabetes mellitus, CAD, chronic anemia, HFpEF here for 4 days shortness of breath, cough and congestion.  Sent here from dialysis due to hypertension not controlled by her home medications.  She states that at the end of coughing spells she has episodes of nonbloody nonbilious emesis.  She also notes a few episodes of nonbloody diarrhea.  She presents hypoxic, but is compliant with her Eliquis.  Her lungs are clear to auscultation lowering suspicion for heart exacerbation but states she is getting worked up for COPD, her exam is not consistent with COPD exacerbation.  She is tender over the lower abdomen, and given her symptoms, will obtain CT PE CT abdomen and pelvis, labs and urinalysis for further assessment.  Given the new hypoxia, will admit regardless of workup.  Patient notes understanding. [ND]   1628 Patient was signed out to me at 1500.  On my evaluation, noting nausea after eating even after treatment with Zofran, will give Reglan send he takes Reglan at home for nausea. On 4L nc. [AD]   1629 Noting headache, with elevated blood pressure, normal neurological examination, will perform CT head in addition to imaging ordered.  Will give midday blood pressure medications, and will order additional pain  "medication. [AD]   1830 @dialysis  [AD]   2048 Returned from dialysis, only able to complete a partial session due to muscle cramping. [AD]   2057 On reevaluation patient taken off oxygen, and was 89% on room air, replaced oxygen, no 93% on 6L nasal cannula. [AD]   2059 BNP(!): 3,166  Will give torsemide as patient still makes some urine. [AD]      ED Course User Index  [AD] Donna Robbins DO  [ND] Harrison Argueta MD         Diagnoses as of 05/28/24 2238   Shortness of breath   Acute on chronic congestive heart failure, unspecified heart failure type (Multi)       Final diagnoses:   [R06.02] Shortness of breath   [I50.9] Acute on chronic congestive heart failure, unspecified heart failure type (Multi)       Medical Decision Making  Briefly this is a 53-year-old female with prior history of ESRD who presented to the ED with shortness of breath, found to have an elevated BNP which is 1k increased from baseline.  Of note she is on home oxygen, but only oxygen when she \"does not feel well\".  While in the ED she was on 4 L nasal cannula after desatting down to 89%.  Was able to receive dialysis today, on return, noting she was unable to complete her full treatment.  Required 6 L nasal cannula to maintain oxygen sats greater than 95%.  Repeat VBG with hyperglycemia, overall without any acute findings.  Discussed with admitting team and was accepted to medicine for further management.        Donna Robbins DO  Emergency Medicine , PGY-2    I reviewed the case with the attending ED physician. The attending ED physician agrees with the plan. Patient and/or patient´s representative was counseled regarding labs, imaging, likely diagnosis, and plan. All questions were answered.    Disclaimer: This note was dictated by speech recognition.  Attempt at proofreading was made to minimize errors.  Errors in transcription may be present.  Please call if questions.  "

## 2024-05-29 ENCOUNTER — DOCUMENTATION (OUTPATIENT)
Dept: BEHAVIORAL HEALTH | Facility: CLINIC | Age: 54
End: 2024-05-29

## 2024-05-29 PROCEDURE — 2500000001 HC RX 250 WO HCPCS SELF ADMINISTERED DRUGS (ALT 637 FOR MEDICARE OP): Performed by: NURSE PRACTITIONER

## 2024-05-29 PROCEDURE — 2500000002 HC RX 250 W HCPCS SELF ADMINISTERED DRUGS (ALT 637 FOR MEDICARE OP, ALT 636 FOR OP/ED): Performed by: INTERNAL MEDICINE

## 2024-05-29 PROCEDURE — 94640 AIRWAY INHALATION TREATMENT: CPT

## 2024-05-29 PROCEDURE — 99232 SBSQ HOSP IP/OBS MODERATE 35: CPT | Performed by: INTERNAL MEDICINE

## 2024-05-29 PROCEDURE — 1200000002 HC GENERAL ROOM WITH TELEMETRY DAILY

## 2024-05-29 PROCEDURE — 2500000002 HC RX 250 W HCPCS SELF ADMINISTERED DRUGS (ALT 637 FOR MEDICARE OP, ALT 636 FOR OP/ED): Performed by: NURSE PRACTITIONER

## 2024-05-29 PROCEDURE — 99233 SBSQ HOSP IP/OBS HIGH 50: CPT | Performed by: NURSE PRACTITIONER

## 2024-05-29 PROCEDURE — 2500000001 HC RX 250 WO HCPCS SELF ADMINISTERED DRUGS (ALT 637 FOR MEDICARE OP)

## 2024-05-29 RX ORDER — IPRATROPIUM BROMIDE AND ALBUTEROL SULFATE 2.5; .5 MG/3ML; MG/3ML
3 SOLUTION RESPIRATORY (INHALATION)
Status: DISCONTINUED | OUTPATIENT
Start: 2024-05-29 | End: 2024-05-31 | Stop reason: HOSPADM

## 2024-05-29 RX ORDER — LIDOCAINE 50 MG/G
1 PATCH TOPICAL DAILY
COMMUNITY

## 2024-05-29 RX ADMIN — CARVEDILOL 50 MG: 12.5 TABLET, FILM COATED ORAL at 00:46

## 2024-05-29 RX ADMIN — GABAPENTIN 300 MG: 300 CAPSULE ORAL at 21:18

## 2024-05-29 RX ADMIN — APIXABAN 5 MG: 5 TABLET, FILM COATED ORAL at 21:18

## 2024-05-29 RX ADMIN — APIXABAN 5 MG: 5 TABLET, FILM COATED ORAL at 08:27

## 2024-05-29 RX ADMIN — SUCRALFATE 1 G: 1 TABLET ORAL at 15:47

## 2024-05-29 RX ADMIN — APIXABAN 5 MG: 5 TABLET, FILM COATED ORAL at 00:47

## 2024-05-29 RX ADMIN — FLUTICASONE FUROATE AND VILANTEROL TRIFENATATE 1 PUFF: 100; 25 POWDER RESPIRATORY (INHALATION) at 09:01

## 2024-05-29 RX ADMIN — AMLODIPINE BESYLATE 10 MG: 10 TABLET ORAL at 08:27

## 2024-05-29 RX ADMIN — IPRATROPIUM BROMIDE AND ALBUTEROL SULFATE 3 ML: .5; 3 SOLUTION RESPIRATORY (INHALATION) at 16:40

## 2024-05-29 RX ADMIN — ATORVASTATIN CALCIUM 80 MG: 80 TABLET, FILM COATED ORAL at 21:18

## 2024-05-29 RX ADMIN — CARVEDILOL 50 MG: 12.5 TABLET, FILM COATED ORAL at 08:27

## 2024-05-29 RX ADMIN — CLONIDINE HYDROCHLORIDE 0.2 MG: 0.1 TABLET ORAL at 07:13

## 2024-05-29 RX ADMIN — SACUBITRIL AND VALSARTAN 1 TABLET: 97; 103 TABLET, FILM COATED ORAL at 02:19

## 2024-05-29 RX ADMIN — ASPIRIN 81 MG: 81 TABLET, COATED ORAL at 08:27

## 2024-05-29 RX ADMIN — ISOSORBIDE MONONITRATE 120 MG: 30 TABLET, EXTENDED RELEASE ORAL at 08:27

## 2024-05-29 RX ADMIN — NIFEDIPINE 90 MG: 90 TABLET, FILM COATED, EXTENDED RELEASE ORAL at 07:13

## 2024-05-29 RX ADMIN — SUCRALFATE 1 G: 1 TABLET ORAL at 07:13

## 2024-05-29 RX ADMIN — Medication 1 TABLET: at 07:12

## 2024-05-29 RX ADMIN — IPRATROPIUM BROMIDE AND ALBUTEROL SULFATE 3 ML: .5; 3 SOLUTION RESPIRATORY (INHALATION) at 12:08

## 2024-05-29 RX ADMIN — ATORVASTATIN CALCIUM 80 MG: 80 TABLET, FILM COATED ORAL at 00:46

## 2024-05-29 RX ADMIN — PANTOPRAZOLE SODIUM 40 MG: 40 TABLET, DELAYED RELEASE ORAL at 08:27

## 2024-05-29 RX ADMIN — HYDRALAZINE HYDROCHLORIDE 100 MG: 25 TABLET ORAL at 08:26

## 2024-05-29 RX ADMIN — IPRATROPIUM BROMIDE AND ALBUTEROL SULFATE 3 ML: .5; 3 SOLUTION RESPIRATORY (INHALATION) at 21:39

## 2024-05-29 RX ADMIN — TORSEMIDE 20 MG: 20 TABLET ORAL at 08:26

## 2024-05-29 RX ADMIN — GABAPENTIN 300 MG: 300 CAPSULE ORAL at 00:46

## 2024-05-29 RX ADMIN — Medication 10 MG: at 00:47

## 2024-05-29 RX ADMIN — SACUBITRIL AND VALSARTAN 1 TABLET: 97; 103 TABLET, FILM COATED ORAL at 12:08

## 2024-05-29 RX ADMIN — IPRATROPIUM BROMIDE AND ALBUTEROL SULFATE 3 ML: .5; 3 SOLUTION RESPIRATORY (INHALATION) at 08:28

## 2024-05-29 SDOH — SOCIAL STABILITY: SOCIAL INSECURITY: DO YOU FEEL ANYONE HAS EXPLOITED OR TAKEN ADVANTAGE OF YOU FINANCIALLY OR OF YOUR PERSONAL PROPERTY?: NO

## 2024-05-29 SDOH — SOCIAL STABILITY: SOCIAL INSECURITY: WERE YOU ABLE TO COMPLETE ALL THE BEHAVIORAL HEALTH SCREENINGS?: YES

## 2024-05-29 SDOH — SOCIAL STABILITY: SOCIAL INSECURITY: HAVE YOU HAD THOUGHTS OF HARMING ANYONE ELSE?: NO

## 2024-05-29 SDOH — SOCIAL STABILITY: SOCIAL INSECURITY: HAVE YOU HAD ANY THOUGHTS OF HARMING ANYONE ELSE?: NO

## 2024-05-29 SDOH — SOCIAL STABILITY: SOCIAL INSECURITY: HAS ANYONE EVER THREATENED TO HURT YOUR FAMILY OR YOUR PETS?: NO

## 2024-05-29 SDOH — SOCIAL STABILITY: SOCIAL INSECURITY: ABUSE: ADULT

## 2024-05-29 SDOH — SOCIAL STABILITY: SOCIAL INSECURITY: DOES ANYONE TRY TO KEEP YOU FROM HAVING/CONTACTING OTHER FRIENDS OR DOING THINGS OUTSIDE YOUR HOME?: NO

## 2024-05-29 SDOH — SOCIAL STABILITY: SOCIAL INSECURITY: ARE YOU OR HAVE YOU BEEN THREATENED OR ABUSED PHYSICALLY, EMOTIONALLY, OR SEXUALLY BY ANYONE?: NO

## 2024-05-29 SDOH — SOCIAL STABILITY: SOCIAL INSECURITY: DO YOU FEEL UNSAFE GOING BACK TO THE PLACE WHERE YOU ARE LIVING?: NO

## 2024-05-29 SDOH — SOCIAL STABILITY: SOCIAL INSECURITY: ARE THERE ANY APPARENT SIGNS OF INJURIES/BEHAVIORS THAT COULD BE RELATED TO ABUSE/NEGLECT?: NO

## 2024-05-29 ASSESSMENT — ACTIVITIES OF DAILY LIVING (ADL)
FEEDING YOURSELF: INDEPENDENT
TOILETING: INDEPENDENT
WALKS IN HOME: INDEPENDENT
BATHING: INDEPENDENT
HEARING - LEFT EAR: FUNCTIONAL
LACK_OF_TRANSPORTATION: NO
JUDGMENT_ADEQUATE_SAFELY_COMPLETE_DAILY_ACTIVITIES: YES
DRESSING YOURSELF: INDEPENDENT
HEARING - RIGHT EAR: FUNCTIONAL
GROOMING: INDEPENDENT
ADEQUATE_TO_COMPLETE_ADL: YES
PATIENT'S MEMORY ADEQUATE TO SAFELY COMPLETE DAILY ACTIVITIES?: YES

## 2024-05-29 ASSESSMENT — PAIN SCALES - GENERAL
PAINLEVEL_OUTOF10: 7
PAINLEVEL_OUTOF10: 1

## 2024-05-29 ASSESSMENT — LIFESTYLE VARIABLES
SUBSTANCE_ABUSE_PAST_12_MONTHS: NO
AUDIT-C TOTAL SCORE: 0
SKIP TO QUESTIONS 9-10: 1
HOW MANY STANDARD DRINKS CONTAINING ALCOHOL DO YOU HAVE ON A TYPICAL DAY: PATIENT DOES NOT DRINK
PRESCIPTION_ABUSE_PAST_12_MONTHS: NO
AUDIT-C TOTAL SCORE: 0
HOW OFTEN DO YOU HAVE 6 OR MORE DRINKS ON ONE OCCASION: NEVER
HOW OFTEN DO YOU HAVE A DRINK CONTAINING ALCOHOL: NEVER

## 2024-05-29 ASSESSMENT — COGNITIVE AND FUNCTIONAL STATUS - GENERAL
MOBILITY SCORE: 24
CLIMB 3 TO 5 STEPS WITH RAILING: A LITTLE
DAILY ACTIVITIY SCORE: 24
MOBILITY SCORE: 23
DAILY ACTIVITIY SCORE: 24
PATIENT BASELINE BEDBOUND: NO

## 2024-05-29 ASSESSMENT — PAIN - FUNCTIONAL ASSESSMENT: PAIN_FUNCTIONAL_ASSESSMENT: 0-10

## 2024-05-29 NOTE — PROGRESS NOTES
Stacey Heath is a 53 y.o. female on day 1 of admission presenting with Hypertensive emergency.      Dietary Orders (From admission, onward)               Adult diet Renal; Potassium Restricted 2 gm (50mEq); 2 - 3 grams Sodium  Diet effective now        Question Answer Comment   Diet type Renal    Potassium restriction: Potassium Restricted 2 gm (50mEq)    Sodium restriction: 2 - 3 grams Sodium                          Objective     Vitals  Temp:  [36.3 °C (97.3 °F)-36.7 °C (98.1 °F)] 36.6 °C (97.9 °F)  Heart Rate:  [75-98] 75  Resp:  [7-35] 18  BP: (134-227)/() 167/67  FiO2 (%):  [21 %-36 %] 36 %       Pain Score: 7         Peripheral IV 05/28/24 20 G Right;Anterior External Jugular (Active)   Number of days: 1       Vent Settings  FiO2 (%):  [21 %-36 %] 36 %    Intake/Output Summary (Last 24 hours) at 5/29/2024 0849  Last data filed at 5/28/2024 2016  Gross per 24 hour   Intake 1400 ml   Output 2008 ml   Net -608 ml       Relevant Results       Scheduled medications  amLODIPine, 10 mg, oral, Daily  apixaban, 5 mg, oral, BID  aspirin, 81 mg, oral, Daily  atorvastatin, 80 mg, oral, Nightly  carvedilol, 50 mg, oral, BID  cloNIDine, 0.2 mg, oral, q8h BELEN  fluticasone furoate-vilanteroL, 1 puff, inhalation, Daily  gabapentin, 300 mg, oral, Nightly  hydrALAZINE, 100 mg, oral, TID  ipratropium-albuteroL, 3 mL, nebulization, 4x daily  isosorbide mononitrate ER, 120 mg, oral, Daily  multivitamin with minerals, 1 tablet, oral, Daily  NIFEdipine ER, 90 mg, oral, Daily before breakfast  pantoprazole, 40 mg, oral, Daily  sacubitriL-valsartan, 1 tablet, oral, BID  sucralfate, 1 g, oral, BID AC  torsemide, 20 mg, oral, Daily      Continuous medications     PRN medications  PRN medications: acetaminophen **OR** acetaminophen **OR** acetaminophen, hydrALAZINE, hydrOXYzine HCL, melatonin, metoclopramide, polyethylene glycol                Assessment/Plan                    S/  See and examined  No new complaints no new event  over the night   ROS: Negative    O/   General Appearance: Alert, Oriented X3, Cooperative, Not in Acute Distress  HEENT: no eye redness, not pale, no jaundice, Throat: not congested, no ulcers    Chest: Good AE bilateral, dar  crackles, ronchies, wheezes.   Heart: RHR, Normal heart sounds S1, S2, no murmur or gallop,   Abdomen: Soft, lax, no hepatosplenomegaly, intact hernia orifices, negative pereira sign, no tenderness,   Genitalia: no abnormal discharge, no ulcers, no swelling.  Lymphatics: no lymphadenopathy, supraclavicular, inguinal trochlear, or axilla.   Neurology: Intact cranial nerves 2 to 12, intact sensation, intact motor function (Power, tone and reflexes). Normal DTR reflexes.   Extremities: no signs of PAD, no swelling no ulcers   Back: no deformity, no SI tenderness, no ulcers.   Skin: no signs of dehydration, no Rash, Bruises, or purpura.      AS:    Ms. Stacey Heath is a 53 y.o. female with pmhx of ESRD on HD (TTS), HTN, T2DM, and COPD who was admitted for hypertensive emergency, signs of decompensated HF due to volume overloaded, didn't completed her last session of HD due to cramps.  Nephrology consulted. Will c/w BP control, and HD.     #Hypertensive emergency  #HD due to Hypervolemia  -Markedly hypertensive on arrival > 200  -Given home medications,    -Will order hydralazine 10mg Q6HP SBP > 170 or DBP > 100  -Resume home medications  -Telemetry     #Acute on chronic hypoxic respiratory failure  -Imaging with evidence of fluid overload  -Reported to be on 4L NC pre-HD, on arrival 6L  -Continuous SPO2 monitoring  -c/w home meds, entresto, meto, isosorbid, GDMT as tolerated      #ESRD on HD  -Underwent HD in ED  -Consult nephrology in AM for continued HD management  -Renal diet  -Renal dosing where indicated     #T2DM  -Per EMR  -Last a1c 3.7 on 3/2024  -No SSI    Code Status: Full  DVT ppx:  on apixaban   Disp: PT/ot         Spoke to my patient, Discussed the medical, social conditions, the  reason for this admission explained our plan and recommendations.  Time spent on the assessment of patient, gathering and interpreting data, review of medical record/patient history, personally reviewing radiographic imaging. With greater than 50% spent in personal discussion with patient.  Time > 45 min        Hai Yi MD

## 2024-05-29 NOTE — CARE PLAN
The patient's goals for the shift include Patient will remain injury free this shift.    The clinical goals for the shift include Patient will remain hemodynamically stable this shift.      Problem: Pain  Goal: My pain/discomfort is manageable  Outcome: Progressing     Problem: Safety  Goal: Patient will be injury free during hospitalization  Outcome: Progressing  Goal: I will remain free of falls  Outcome: Progressing     Problem: Daily Care  Goal: Daily care needs are met  Outcome: Progressing     Problem: Psychosocial Needs  Goal: Demonstrates ability to cope with hospitalization/illness  Outcome: Progressing  Goal: Collaborate with me, my family, and caregiver to identify my specific goals  Outcome: Progressing  Flowsheets (Taken 5/29/2024 0030)  Cultural Requests During Hospitalization: none  Spiritual Requests During Hospitalization: none     Problem: Discharge Barriers  Goal: My discharge needs are met  Outcome: Progressing

## 2024-05-29 NOTE — PROGRESS NOTES
Stacey Heath is a 53 y.o. female on day 1 of admission presenting with Hypertensive emergency.    Transitional Care Coordination Progress Note:  Patient discussed during interdisciplinary rounds.   Team members present: MD and TCC  Plan per Medical/Surgical team: Patient here for Hypertension Emergency. Trying to get BP under control.   Payor: [facility]  Discharge disposition: Home no needs. Patient wants to switch dialysis centers, [facility] trying to accommodate.  Potential Barriers: None  ADOD: 05/30/24    KYLE SMITH RN

## 2024-05-29 NOTE — PROGRESS NOTES
05/29/24 1509   Discharge Planning   Living Arrangements Spouse/significant other;Other (Comment)  (nephew)   Type of Residence Private residence   Do you have animals or pets at home? No   Who is requesting discharge planning? Provider   Home or Post Acute Services None   Patient expects to be discharged to: home   Financial Resource Strain   How hard is it for you to pay for the very basics like food, housing, medical care, and heating? Not very   Housing Stability   In the last 12 months, was there a time when you were not able to pay the mortgage or rent on time? N   In the last 12 months, how many places have you lived? 1   In the last 12 months, was there a time when you did not have a steady place to sleep or slept in a shelter (including now)? N   Transportation Needs   In the past 12 months, has lack of transportation kept you from medical appointments or from getting medications? no        LISW spoke with the patient on this date to complete assessment.  Patient reported that she lives at home with her fiance and nephew.  Patient has a cane and walker, but does not use it unless she is really weak.  Patient drives herself to appointments, unless she is feelig bad, and then her fiance drives her.  Patient goes to dialysis at [facility] on TTS 6PM.  Patient is on O2 PRN at home and couldn't remember the name of the company.  Patient told this LISW that she wanted to switch back to [facility].  She reported that she had been going to [facility] about a year ago and switched to [facility] when she moved.  She explained that she would be ok with going to [facility] (her previous dialysis center) or [facility] which is now closer to where she lives.  She explained that [facility] is short staffed, she never sees her doctor and she sees bugs on the equipment. LISW reported that patient's do not tend to switch dialysis centers while in the hospital since the new dialysis center will end up needed information from the old center.  PORFIRIO  explained that she could contact [facility] to see if they have anything, but that patient may need to go back to [facility] at discharge.  Patient reported understanding.  Patient explained that she had been followed by Dr Rice at [facility] and she would be ok with him following her again.  PORFIRIO sent an email to [facility] intake.  PORFIRIO will follow up with patient.     MADISON Restrepo, PORFIRIO-S

## 2024-05-29 NOTE — SIGNIFICANT EVENT
RAPID RESPONSE RN NOTE - RADAR 6   05/29/24 1546   Onset Documentation   Rapid Response Initiated By Radar auto page   Location/Room [facility]  ([facility] 5067-A)   Pager Time 1546   Arrival Time 1548   Event End Time 1600   Level II Called No   Primary Reason for Call Radar auto page  (RADAR 6)     VS: 36.6, 70, 18, 105/55, 90%.  Upon arrival patient (COPD) sitting up in bed, resting comfortably.  Per Pat RN the patient was attempting to wean her oxygen when VS were obtained- Patient now back on her supplemental oxygen.  No acute concerns from Nursing at this time- RN to contact rapid response Team with any further issues or patient deterioration.

## 2024-05-29 NOTE — CARE PLAN
The patient's goals for the shift include Patient will remain injury free this shift. Goal met.    The clinical goals for the shift include Pt oxygen zakia be within normal limits during the shift. Goal progressing Pt oxygen was within normal limits with 2L. Pt was checked and monitored throughout the shift. Pt had no injury or falls during the shift. Most goals met.        Problem: Pain  Goal: My pain/discomfort is manageable  Outcome: Met     Problem: Safety  Goal: Patient will be injury free during hospitalization  Outcome: Met  Goal: I will remain free of falls  Outcome: Met     Problem: Daily Care  Goal: Daily care needs are met  5/29/2024 1853 by Pat Roy RN  Outcome: Met  5/29/2024 1853 by Pat Roy RN  Outcome: Progressing     Problem: Psychosocial Needs  Goal: Demonstrates ability to cope with hospitalization/illness  5/29/2024 1853 by Pat Roy, RN  Outcome: Met  5/29/2024 1853 by Pat Roy RN  Outcome: Progressing

## 2024-05-29 NOTE — PROGRESS NOTES
Pharmacy Medication History Review    Stacey Heath is a 53 y.o. female admitted for Hypertensive emergency. Pharmacy reviewed the patient's [id number] medications and allergies for accuracy.    The list below reflects the updated PTA list. Comments regarding how patient may be taking medications differently can be found in the Admit Orders Activity  Prior to Admission Medications   Prescriptions Last Dose Informant   NIFEdipine ER (Adalat CC) 90 mg 24 hr tablet 5/27/2024 at patient request refills Self   Sig: Take 1 tablet (90 mg) by mouth once daily in the morning. Take before meals. Do not crush, chew, or split.   SUMAtriptan (Imitrex) 50 mg tablet  Self, Other   Sig: Take 1 tablet (50 mg) by mouth 1 time if needed for migraine.   albuterol (Ventolin HFA) 90 mcg/actuation inhaler 5/27/2024 Self   Sig: Inhale 2 puffs 2 times a day.   albuterol 1.25 mg/3 mL nebulizer solution 5/27/2024 Self   Sig: Take 3 mL (1.25 mg) by nebulization every 6 hours if needed for wheezing.   amLODIPine (Norvasc) 10 mg tablet 5/27/2024 Self   Sig: TAKE 1 TABLET BY MOUTH ONCE DAILY BEFORE DIALYSIS   apixaban (Eliquis) 5 mg tablet Not Taking Self   Sig: Take 1 tablet (5 mg) by mouth 2 times a day.   Patient not taking: Reported on 5/29/2024   apixaban (Eliquis) 5 mg tablet 5/27/2024 at patient request refills Self   Sig: Take 1 tablet (5 mg) by mouth 2 times a day.   aspirin 81 mg EC tablet 5/27/2024 Self   Sig: Take 1 tablet (81 mg) by mouth once daily.   atorvastatin (Lipitor) 80 mg tablet 5/27/2024 at patient request refills Self   Sig: Take 1 tablet (80 mg) by mouth once daily at bedtime.   carvedilol (Coreg) 25 mg tablet 5/27/2024 at patient request refills Self   Sig: Take 2 tablets (50 mg) by mouth 2 times a day.   cloNIDine (Catapres) 0.2 mg tablet 5/27/2024 at patient request refills Self   Sig: Take 1 tablet (0.2 mg) by mouth every 8 hours.   epoetin joceline (Epogen,Procrit) 10,000 unit/mL injection 5/28/2024 at patient  recieves this mediacation at dialysis Self   Sig: Inject 1 mL (10,000 Units) under the skin 3 times a week.   fluticasone (Flonase) 50 mcg/actuation nasal spray 5/27/2024 Self   Sig: Administer 2 sprays into each nostril once daily. Shake gently. Before first use, prime pump. After use, clean tip and replace cap.   fluticasone propion-salmeteroL (Advair Diskus) 100-50 mcg/dose diskus inhaler 5/27/2024 at patient request refills Self   Sig: Inhale 1 puff once daily.   gabapentin (Neurontin) 300 mg capsule 5/27/2024 Self   Sig: Take 1 capsule (300 mg) by mouth once daily at bedtime.   hydrALAZINE (Apresoline) 100 mg tablet 5/27/2024 at patient request refills Self   Sig: Take 1 tablet (100 mg) by mouth 3 times a day for 23 days.   hydrOXYzine HCL (Atarax) 10 mg tablet 5/27/2024 at patient takes prn-patient request refills Self   Sig: Take 1 tablet (10 mg) by mouth every 6 hours if needed for itching.   isosorbide mononitrate ER (Imdur) 120 mg 24 hr tablet 5/27/2024 at patient request refills Self   Sig: Take 1 tablet (120 mg) by mouth once daily. Do not crush or chew.   lidocaine (Lidoderm) 5 % patch 5/27/2024 Self   Sig: Place 1 patch on the skin once daily. Remove & discard patch within 12 hours or as directed by MD.   metoclopramide (Reglan) 5 mg tablet Not Taking Self   Sig: TAKE 1 TABLET BY MOUTH EVERY 6 HOURS AS NEEDED   Patient not taking: Reported on 5/29/2024   nutritional drink (Ensure) liquid Not Taking Self   Sig: Take 118 mL by mouth 2 times a day.   Patient not taking: Reported on 5/29/2024   ondansetron (Zofran) 4 mg tablet 5/27/2024 at patient taking surplus  supply Self   Sig: Take 1 tablet (4 mg) by mouth every 8 hours if needed for nausea or vomiting.   pantoprazole (ProtoNix) 40 mg EC tablet 5/27/2024 at patient request refills Self   Sig: Take 1 tablet (40 mg) by mouth once daily. Do not crush, chew, or split.   sacubitriL-valsartan (Entresto)  mg tablet 5/27/2024 Self   Sig: Take 1 tablet  by mouth 2 times a day.   sodium chloride (Ocean) 0.65 % nasal spray Not Taking Self   Sig: Administer 1 spray into each nostril 4 times a day as needed for congestion.   Patient not taking: Reported on 5/29/2024   torsemide (Demadex) 20 mg tablet 5/27/2024 at patient request refills Self   Sig: take 1 tablet by mouth as directed. Take once daily on dialysis days, twice daily on nondialysis days.      Facility-Administered Medications: None        The list below reflects the updated allergy list. Please review each documented allergy for additional clarification and justification.  Allergies  Reviewed by Dylon Calderon on 5/29/2024        Severity Reactions Comments    Iodine High Hives, Itching, Unknown     Bioflavonoids Not Specified Swelling     Codeine Not Specified Itching, Hives, Unknown Tolerates percocet   Tolerates percocet Tolerates percocet    Flowers Not Specified Itching     Hydromorphone Not Specified Itching     Shellfish Containing Products Not Specified Swelling SEAFOOD            Patient accepts M2B at discharge. Pharmacy has been updated to [facility].    Sources used to complete the med history include   [facility] ( yes )  Allergy list sure scripts   Allergy list epic   Epic dispense history   Patient interview     Below are additional concerns with the patient's PTA list.  Patient is a moderate historian knows medication dose frequency names for some medications and indications for others   Patient request refills for: apibaxan 5 mg - atorvastatin 80 mg - carvedilol 25 mg - clonidine 0.2 mg - advair diskus 100-50 mcg - hydralazine 100 mg - hydroxyzine 10 mg - isosorbide mononitrate 120 mg - nifedipine 90 mg - ondansetron 4 mg - torsemide 20 mg      [facility]  4/24/2024 gabapentin 300 mg qty 30 ds 30   1/9/2024 gabapentin 300 mg qty 30 ds 30   10/26/2023 gabapentin 300 mg qty 30 ds 30   10/6/2023 tramadol 50 mg qty 6 ds 3        Dylon Calderon [facility]  Transitions of Care Pharmacy Technician   Meds Ambulatory  and Retail Services  Please reach out via [employer] Secure Chat for questions, or if no response call [employer] or [employer] “MedRec”

## 2024-05-29 NOTE — PROGRESS NOTES
"Stacey Heath is a 53 y.o. female on day 1 of admission presenting with Hypertensive emergency.    Subjective   Pt resting in bed. Has c/o cramping to hands, legs, sob, h/a improved, denies n/v/d, fever, cough, chills, pain, chest pains, constipation, light head, dizziness       Objective     Physical Exam  Vitals and nursing note reviewed.   Cardiovascular:      Rate and Rhythm: Normal rate and regular rhythm.      Comments: HR-68  Pulmonary:      Breath sounds: Rhonchi and rales present.      Comments: POX 97% O2 2L  No use of accessory muscles, NAD  Abdominal:      General: There is distension.      Comments: Non-tender to palpation   Genitourinary:     Comments: States make little urine  Musculoskeletal:      Comments: Ble without edema  Cont with c/o cramping to hands, legs   Skin:     General: Skin is warm and dry.   Neurological:      Mental Status: She is alert and oriented to person, place, and time.   Psychiatric:         Mood and Affect: Mood normal.         Behavior: Behavior normal.         Last Recorded Vitals  Blood pressure 105/55, pulse 70, temperature 36.6 °C (97.9 °F), resp. rate 16, height 1.397 m (4' 7\"), weight 52.7 kg (116 lb 2.9 oz), SpO2 90%.  Intake/Output last 3 Shifts:  I/O last 3 completed shifts:  In: 1400 (26.6 mL/kg) [I.V.:900 (17.1 mL/kg); Other:500]  Out: 2008 (38.1 mL/kg) [Other:2008]  Weight: 52.7 kg     Relevant Results  Scheduled medications  amLODIPine, 10 mg, oral, Daily  apixaban, 5 mg, oral, BID  aspirin, 81 mg, oral, Daily  atorvastatin, 80 mg, oral, Nightly  carvedilol, 50 mg, oral, BID  cloNIDine, 0.2 mg, oral, q8h BELEN  fluticasone furoate-vilanteroL, 1 puff, inhalation, Daily  gabapentin, 300 mg, oral, Nightly  hydrALAZINE, 100 mg, oral, TID  ipratropium-albuteroL, 3 mL, nebulization, 4x daily  isosorbide mononitrate ER, 120 mg, oral, Daily  multivitamin with minerals, 1 tablet, oral, Daily  NIFEdipine ER, 90 mg, oral, Daily before breakfast  pantoprazole, 40 mg, oral, " Daily  sacubitriL-valsartan, 1 tablet, oral, BID  sucralfate, 1 g, oral, BID AC  torsemide, 20 mg, oral, Daily      Continuous medications     PRN medications  PRN medications: acetaminophen **OR** acetaminophen **OR** acetaminophen, hydrALAZINE, hydrOXYzine HCL, melatonin, metoclopramide, polyethylene glycol   Results for orders placed or performed during the hospital encounter of 05/28/24 (from the past 24 hour(s))   Sars-CoV-2 PCR   Result Value Ref Range    Coronavirus 2019, PCR Not Detected Not Detected   Blood Gas Venous Full Panel   Result Value Ref Range    POCT pH, Venous 7.41 7.33 - 7.43 pH    POCT pCO2, Venous 54 (H) 41 - 51 mm Hg    POCT pO2, Venous 37 35 - 45 mm Hg    POCT SO2, Venous 59 45 - 75 %    POCT Oxy Hemoglobin, Venous 57.6 45.0 - 75.0 %    POCT Hematocrit Calculated, Venous 37.0 36.0 - 46.0 %    POCT Sodium, Venous 134 (L) 136 - 145 mmol/L    POCT Potassium, Venous 4.0 3.5 - 5.3 mmol/L    POCT Chloride, Venous 98 98 - 107 mmol/L    POCT Ionized Calicum, Venous 1.11 1.10 - 1.33 mmol/L    POCT Glucose, Venous 349 (H) 74 - 99 mg/dL    POCT Lactate, Venous 1.4 0.4 - 2.0 mmol/L    POCT Base Excess, Venous 8.0 (H) -2.0 - 3.0 mmol/L    POCT HCO3 Calculated, Venous 34.2 (H) 22.0 - 26.0 mmol/L    POCT Hemoglobin, Venous 12.2 12.0 - 16.0 g/dL    POCT Anion Gap, Venous 6.0 (L) 10.0 - 25.0 mmol/L    Patient Temperature 37.0 degrees Celsius    FiO2 0 %                     Assessment/Plan   Principal Problem:    Hypertensive emergency    Did not Tolerate hemodialysis yesterday with net fluid off 1.5L. Uf off, 100cc ns bolus given for c/o cramping, goal lowered to 2.5L per pt request tx discontinue by 50mins .     Bp with slight elevations during tx.  Bp between (121//83) and hr between (), euvolemic on exam and has stable electrolytes K+=5.3 with mod hemolyzed.. awaiting new results     Outpatient Dialysis schedule:  [place] [facility] [facility]      Access: rt arm fist- no issues - able to achieve       Anemia of ESRD:   not on SOFÍA ... Current hgb 12.8.. will cont to monitor... (Mircera 200mcg q2wks op)     CKD-MBD Phosphate Binder:multivitamin with minerals 1 tablet daily     Plan HD tomorrow with UF as tolerated     Renal diet      Please obtain daily standing wt (if possible)     Medication to be adjusted for ESRD      Patient to continue regular HD schedule while inpatient and to follow with the outpatient nephrologist at discharge          LILLY Rebollar-CNP

## 2024-05-29 NOTE — H&P
"History Of Present Illness  Stacey Heath is a 53 y.o. female with past medical history of ESRD on HD ([facility]), poorly controlled HTN, ?T2DM, and COPD (baseline L NC) who presented to the ED with hypertensive, shortness of breath, chest pain, headache, and nausea/vomiting. Apparently, she was at her HD session when it was stopped early due to her \"cramping up\". On exam in ED31, she reports feeling unchanged since arrival. She reports only wearing oxygen \"when she feels like this\", typically 3-4L via NC. She denies any missed HD sessions. She reports being compliant with her home medications. She denies smoking, EtOH use, or illicit substance use.     Past Medical History  Past Medical History:   Diagnosis Date    Acute pancreatitis ([facility]) 01/20/2024    Acute pyelonephritis due to bacteria 01/16/2023    Acute renal failure (CMS-HCC) 02/25/2023    YARA (acute kidney injury) (CMS-HCC) 08/03/2021    Bilateral shoulder pain 02/25/2023    Breast pain 05/18/2021    History of breast pain    Cardiac/pericardial tamponade ([facility]) 01/20/2024    Chronic renal failure syndrome 01/10/2022    Community acquired pneumonia 11/03/2021    Dependence on renal dialysis (CMS-HCC) 11/21/2022    Hemodialysis patient    Disorder of sweat glands 02/25/2023    Dry eye syndrome of bilateral lacrimal glands 03/07/2017    Dry eyes    Essential (primary) hypertension 12/27/2022    Hypertension    Flash pulmonary edema (Multi) 01/20/2024    Comment on above: FLASH PULMONARY EDEMA J81.0    History of acute pancreatitis 12/21/2020    History of acute pancreatitis    Low grade squamous intraepithelial lesion (LGSIL) on cervicovaginal cytologic smear 02/25/2023    Migraine headache 02/25/2023    Comment on above: MIGRAINES    Migraines     History of migraine    Obstruction of urinary stent (CMS-HCC) 02/25/2023    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    Other conditions influencing health status 09/01/2021    History of nephrostomy "    Pain in upper limb 01/20/2024    Comment on above: ARM PAIN    Periorbital cellulitis 06/13/2014    Formatting of this note might be different from the original. Improved Afebrile No pain on EOM Can see clearly from left eye PLAN PO augmentin as outpatient    Pleural effusion 12/12/2023    Pneumonia 04/11/2023    Sepsis (Multi) 01/20/2024    Status migrainosus 08/21/2012    Type 2 myocardial infarction (Multi) 01/20/2024    Unspecified diastolic (congestive) heart failure (Multi) 11/21/2022    Heart failure with preserved left ventricular function (HFpEF)    Vaginal dryness 07/29/2022    Vaginal dryness       Surgical History  Past Surgical History:   Procedure Laterality Date    APPENDECTOMY  03/07/2017    Appendectomy    CT ABDOMEN ANGIOGRAM W AND/OR WO IV CONTRAST  4/23/2023    CT ABDOMEN ANGIOGRAM W AND/OR WO IV CONTRAST [facility] CT    OTHER SURGICAL HISTORY  03/07/2017    Cystoscopy With Pyeloscopy With Removal Of Calculus    OTHER SURGICAL HISTORY  03/07/2017    Anoscopy For Polyp Removal    OTHER SURGICAL HISTORY  12/08/2021    Arteriovenous fistula creation procedure    OTHER SURGICAL HISTORY  12/08/2021    Dialysis tunneled catheter placement    TUBAL LIGATION  03/07/2017    Tubal Ligation        Social History  She reports that she has quit smoking. Her smoking use included cigarettes. She has never used smokeless tobacco. She reports that she does not currently use alcohol. She reports that she does not use drugs.    Family History  Family History   Problem Relation Name Age of Onset    Other (Cerebrovascular Accident) Father      Heart failure Paternal Grandmother      Breast cancer Paternal Grandmother      Other (Primary Cervical Cancer) Paternal Grandmother      Diabetes Paternal Grandfather          Allergies  Iodine, Bioflavonoids, Codeine, Flowers, Hydromorphone, and Shellfish containing products    Review of Systems   Constitutional:  Negative for chills and fever.   Respiratory:  Positive for  "shortness of breath.    Cardiovascular:  Positive for chest pain. Negative for palpitations.   Gastrointestinal:  Positive for nausea and vomiting. Negative for abdominal pain, constipation and diarrhea.   Genitourinary:  Negative for dysuria, flank pain, frequency, hematuria and urgency.   Neurological:  Positive for headaches.   All other systems reviewed and are negative.       Physical Exam  Vitals reviewed.   HENT:      Head: Normocephalic and atraumatic.   Cardiovascular:      Rate and Rhythm: Normal rate and regular rhythm.      Heart sounds: Normal heart sounds.   Pulmonary:      Effort: Pulmonary effort is normal.      Breath sounds: Normal air entry. Rhonchi present.   Abdominal:      General: Bowel sounds are normal.      Palpations: Abdomen is soft.      Tenderness: There is no abdominal tenderness.   Musculoskeletal:         General: No deformity.   Skin:     General: Skin is warm and dry.   Neurological:      General: No focal deficit present.      Mental Status: She is alert and oriented to person, place, and time.   Psychiatric:         Mood and Affect: Mood normal.         Behavior: Behavior normal.          Last Recorded Vitals  Blood pressure 165/86, pulse 92, temperature 36.5 °C (97.7 °F), temperature source Temporal, resp. rate (!) 26, height 1.397 m (4' 7\"), weight 52.7 kg (116 lb 2.9 oz), SpO2 (!) 93%.    Relevant Results      Lab Results   Component Value Date    WBC 6.2 05/28/2024    HGB 12.8 05/28/2024    HCT 39.3 05/28/2024    MCV 84 05/28/2024     05/28/2024     Lab Results   Component Value Date    GLUCOSE 74 05/28/2024    CALCIUM 9.9 05/28/2024     05/28/2024    K 5.3 05/28/2024    CO2 21 05/28/2024    CL 93 (L) 05/28/2024    BUN 56 (H) 05/28/2024    CREATININE 12.19 (H) 05/28/2024     Lab Results   Component Value Date    CKTOTAL 169 05/20/2023    TROPONINI 0.16 (H) 01/11/2022     CT abdomen pelvis w IV contrast  Narrative: Interpreted By:  Osorio Maguire,  and Eduardo " Rainer   STUDY:  CT ABDOMEN PELVIS W IV CONTRAST;  5/28/2024 5:30 pm      INDICATION:  Signs/Symptoms:abd pain.      COMPARISON:  CT abdomen and pelvis on 07/08/2023. CTA chest abdomen pelvis on  04/04/2024.      ACCESSION NUMBER(S):  [id number]      ORDERING CLINICIAN:  MAKAYLA ESCUDERO      TECHNIQUE:  CT of the abdomen and pelvis was performed.  Standard contiguous  axial images were obtained at 3 mm slice thickness through the  abdomen and pelvis. Coronal and sagittal reconstructions at 3 mm  slice thickness were performed.      80 ml of contrast Omnipaque 350 were administered intravenously  without immediate complication.      FINDINGS:  LOWER CHEST:  Findings in the chest including new consolidative opacity in the  right middle lobe are reported separately. Unchanged left pleural  effusion. Residual hyperdense material in the esophagus suggestive of  esophageal reflux.      ABDOMEN:      LIVER:  The liver is normal in size without evidence of focal liver lesions.      BILE DUCTS:  No intrahepatic biliary ductal dilatation. The common bile duct is  prominent, likely secondary to post cholecystectomy status.      GALLBLADDER:  Surgically absent.      PANCREAS:  The pancreas appears unremarkable without evidence of ductal  dilatation or masses.      SPLEEN:  The spleen is normal in size without focal lesions.      ADRENAL GLANDS:  Bilateral adrenal glands appear normal.      KIDNEYS AND URETERS:  The kidneys are  atrophic and enhance symmetrically. No  hydroureteronephrosis or nephroureterolithiasis is identified.      PELVIS:      BLADDER:  The bladder is collapsed limiting assessment.      REPRODUCTIVE ORGANS:  No pelvic masses.      BOWEL:  The stomach is distended with gastric contents without wall  thickening. The small and large bowel are normal in caliber and  demonstrate no wall thickening.   The appendix is not definitely  visualized. There is however no pericecal stranding or fluid.  Moderate amount  of formed stool throughout the colon without wall  thickening or acute inflammatory change.      VESSELS:  There is no aneurysmal dilatation of the abdominal aorta. There are  circumferential atherosclerotic calcifications of the abdominal aorta  and its branches. Mesenteric vessels appear grossly patent.      PERITONEUM/RETROPERITONEUM/LYMPH NODES:  There is no free or loculated fluid collection, no free  intraperitoneal air. The retroperitoneum appears normal. Mild diffuse  mesenteric edema.      BONES AND ABDOMINAL WALL:  No suspicious osseous lesions are identified. Heterogeneous dense  bone marrow which may be seen in the setting of chronic renal  disease. Mild diffuse body wall edema.      Impression: 1. Findings in the chest are reported separately, including new  subsegmental consolidation in the right middle lobe which may  represent atelectasis versus pneumonia. Stable findings of left-sided  cardiomegaly and loculated left pleural effusion with suspected  chronic atelectasis in the left lower lobe.  2. Bilateral renal atrophy with body wall edema and diffuse  mesenteric edema similar to prior. Formed stool throughout the colon  without evidence of acute bowel pathology. High density content in  the thoracic esophagus suggestive of esophageal reflux.          I personally reviewed the images/study and I agree with the findings  as stated by Dr. Rainer Clark M.D. This study was interpreted at  [place], [place].      MACRO:  None      Signed by: Osorio Maguire 5/28/2024 9:27 PM  Dictation workstation:   [id number]  CT angio chest for pulmonary embolism  Narrative: Interpreted By:  Osorio Maguire and Baker Zachary   STUDY:  CT ANGIO CHEST FOR PULMONARY EMBOLISM;  5/28/2024 5:30 pm      INDICATION:  Signs/Symptoms:PE ruleout.      COMPARISON:  CTA chest abdomen pelvis on 04/04/2024.      ACCESSION NUMBER(S):  [id number]      ORDERING CLINICIAN:  MAKAYLA  ESCUDERO      TECHNIQUE:  Helical data acquisition of the chest was obtained after intravenous  administration of 80 ML Omnipaque 350, as per PE protocol. Images  were reformatted in coronal and sagittal planes. Axial and coronal  maximum intensity projection (MIP) images were created and reviewed.      FINDINGS:  POTENTIAL LIMITATIONS OF THE STUDY: None      HEART AND VESSELS:  There are no discrete filling defects within main pulmonary artery  and its branches to suggest acute pulmonary embolism. Main pulmonary  artery and its branches are normal in caliber.      The thoracic aorta normal in course and caliber. Scattered  atherosclerotic calcifications of the thoracic aorta. Heavy coronary  artery calcifications are seen. Please note,the study is not  optimized for evaluation of coronary arteries.      Note is made of vascular stent within the right axilla.      There is left-sided cardiomegaly similar to prior.  There is no pericardial effusion seen.      MEDIASTINUM AND ADRIANNA, LOWER NECK AND AXILLA:  The visualized thyroid gland is within normal limits.  No evidence of thoracic lymphadenopathy by CT criteria.      There is some hyperdense material throughout the thoracic esophagus  suggestive of esophageal reflux.      LUNGS AND AIRWAYS:  The trachea and central airways are patent. No endobronchial lesion  is seen.      Patchy consolidative opacity within the posterior aspect of the right  middle lobe is new since 04/04/2024. There is unchanged small  loculated left pleural effusion and left lower lobe consolidative  opacity with associated lung volume loss favoring atelectasis.      UPPER ABDOMEN:  Please see same day separately dictated CT abdomen/pelvis for  evaluation.      CHEST WALL AND OSSEOUS STRUCTURES:  Mild diffuse body wall edema.  No acute osseous pathology.There are no suspicious osseous lesions.      Impression: 1. No evidence of pulmonary emboli to the distal segmental level.  2. Unchanged small  loculated left pleural effusion and associated  left lower lobe subsegmental consolidative opacity favoring to  represent atelectasis. There is a new subsegmental consolidation in  the right middle lobe. This may also represent atelectasis, however  developing pneumonia is not excluded. No pleural effusion on the  right.  3. Similar findings of left-sided cardiomegaly, interseptal  thickening, and mosaic attenuation of the lung fields as well as body  wall edema suggestive of fluid overload.  4. Hyperdense material throughout the thoracic esophagus similar to  prior suggestive of esophageal reflux.      I personally reviewed the images/study and I agree with the findings  as stated by Dr. Rainer Clark M.D. This study was interpreted at  [place], [place].      MACRO:  None      Signed by: Osorio Maguire 5/28/2024 9:20 PM  Dictation workstation:   [id number]  ECG 12 lead  See ED provider note for full interpretation and clinical correlation  Confirmed by Ollie Glover ([id number]) on 5/28/2024 8:15:07 PM  CT head wo IV contrast  Narrative: Interpreted By:  Dusty Le,   STUDY:  CT HEAD WO IV CONTRAST      INDICATION:  Signs/Symptoms:HEADACHE WITH HTN      COMPARISON:      Head CT 04/03/2024      ACCESSION NUMBER(S):  [id number]      ORDERING CLINICIAN:  CLAY SHEPARD      TECHNIQUE:  CT of the head was performed without the administration of  intravenous contrast.      FINDINGS:  BRAIN PARENCHYMA: No acute intraparenchymal hemorrhage, acute  territorial infarct or masses. Mild microangiopathic disease and  diffuse parenchymal volume loss.      MIDLINE SHIFT OR HERNIATION: No midline shift or herniation.      VENTRICLES: No hydrocephalus.      DURAL VENOUS SINUSES: No hyperdensity to suggest acute thrombus.      EXTRA-AXIAL SPACES (epidural, subdural, subarachnoid spaces and basal  cisterns): No collections.      VISUALIZED ORBITS: Normal.      VISUALIZED PARANASAL SINUSES  AND MASTOID AIR CELLS: Paranasal sinuses  are clear. Tympanomastoid cavities are aerated.      SOFT TISSUES: Normal.      OSSEOUS STRUCTURES: Normal.      Impression: No acute intracranial abnormality.      Signed by: Dusty Le 5/28/2024 6:06 PM  Dictation workstation:   [id number]  XR chest 1 view  Narrative: Interpreted By:  Terry Walker and Kamau Nyokabi   STUDY:  XR CHEST 1 VIEW;  5/28/2024 10:25 am      INDICATION:  Signs/Symptoms:sob.      COMPARISON:  Chest x-ray 04/13/2024, CT chest abdomen pelvis 04/04/2020      ACCESSION NUMBER(S):  [id number]      ORDERING CLINICIAN:  ANITA SAUNDERS      FINDINGS:  AP radiograph of the chest.      Similar positioning of right subclavian stent.      CARDIOMEDIASTINAL SILHOUETTE:  Cardiomediastinal silhouette enlargement is similar in size and  appearance to prior radiograph..      LUNGS:  Retrocardiac opacity and blunting of the left costophrenic angle  appears overall similar compared to prior radiograph 04/13/2024.  Slightly improved infiltrative in the left upper lobe when compared  to prior radiograph. No pneumothorax, pleural effusion, or focal  consolidation.      ABDOMEN:  No remarkable upper abdominal findings.      BONES:  No acute osseous changes.      Impression: 1. Similar appearing retrocardiac opacity and left pleural effusion,  better evaluated on CT 04/04/2024.  2. Slightly improved infiltrate in the left upper lobe which may be  atelectasis versus infectious etiology.      I personally reviewed the images/study and I agree with Dr. Osmany Covington findings as stated. This study was interpreted at [place], [place]      MACRO:  None      Signed by: Terry Walker 5/28/2024 10:49 AM  Dictation workstation:   [id number]    ED Medication Administration from 05/28/2024 0853 to 05/28/2024 3855         Date/Time Order Dose Route Action Action by     05/28/2024 1010 EDT hydrALAZINE (Apresoline)  injection 20 mg -- intravenous Canceled Entry Marino B 05/28/2024 1208 EDT hydrALAZINE (Apresoline) injection 10 mg 10 mg intravenous Given Marino B 05/28/2024 1209 EDT ketorolac (Toradol) injection 15 mg 15 mg intravenous Given Marino B 05/28/2024 1209 EDT methylPREDNISolone sod succinate (SOLU-Medrol) injection 40 mg 40 mg intravenous Given Marino B 05/28/2024 1209 EDT ondansetron (Zofran) injection 4 mg 4 mg intravenous Given Marino B 05/28/2024 1213 EDT isosorbide mononitrate ER (Imdur) 24 hr tablet 120 mg 120 mg oral Given Marino B 05/28/2024 1334 EDT aspirin tablet 325 mg 325 mg oral Given Marinoard, B 05/28/2024 1334 EDT NIFEdipine ER (Adalat CC) 24 hr tablet 90 mg 90 mg oral Given Marino B 05/28/2024 1554 EDT diphenhydrAMINE (BENADryl) injection 50 mg 50 mg intravenous Given Marinoard, B 05/28/2024 1554 EDT methylPREDNISolone sod succinate (SOLU-Medrol) injection 40 mg 40 mg intravenous Given Marino B 05/28/2024 1649 EDT albuterol 2.5 mg /3 mL (0.083 %) nebulizer solution 2.5 mg 2.5 mg nebulization Given Marino B 05/28/2024 1649 EDT cloNIDine (Catapres) tablet 0.2 mg 0.2 mg oral Given Marino B 05/28/2024 1649 EDT hydrALAZINE (Apresoline) tablet 100 mg 100 mg oral Given Marino B 05/28/2024 1649 EDT morphine injection 4 mg 4 mg intravenous Given Marino B 05/28/2024 1728 EDT iohexol (OMNIPaque) 350 mg iodine/mL solution 80 mL 80 mL intravenous Given RODGER Almaguer     05/28/2024 1855 EDT methylPREDNISolone sod succinate (SOLU-Medrol) injection 40 mg 40 mg intravenous Not Given Marino B 05/28/2024 2128 EDT cloNIDine (Catapres) tablet 0.2 mg 0.2 mg oral Given DIMA Llanes     05/28/2024 2128 EDT hydrALAZINE (Apresoline) tablet 100 mg 100 mg oral Given DIMA Llanes     05/28/2024 2133 EDT bumetanide (Bumex) injection 2 mg 2 mg intravenous Given DIMA Llanes     05/28/2024 2159 EDT morphine injection 4 mg 4 mg intravenous Given  DIMA Llanes               Assessment/Plan   Principal Problem:    Hypertensive emergency      #Hypertensive emergency  #Hypervolemia  -Markedly hypertensive on arrival  -Given home medications, HD  -BP now improved  -Likely the underlying cause of today's presentation  -Will order hydralazine 10mg Q6HP SBP > 170 or DBP > 100  -Resume home medications  -Telemetry    #Acute on chronic hypoxic respiratory failure  -Imaging with evidence of fluid overload as per #1  -Reported to be on 4L NC pre-HD, now on 6L  -Continuous SPO2 monitoring  -Titrate to baseline as able  -Doubt PNA at this time    #ESRD on HD  -Underwent HD in ED  -Consult nephrology in AM for continued HD management  -Renal diet  -Renal dosing where indicated    #T2DM  -Per EMR  -Last a1c 3.7 3/2024  -No SSI             Nate Larose, LILLY-CNP

## 2024-05-29 NOTE — NURSING NOTE
Report to Receiving RN:    Report To: Sincere ISAAC   Time Report Called: 2046  Hand-Off Communication: HD completed and tolerated fair. Pt started cramping and requested to end tx early with 50 minutes remaining. Dr Allison notified and form signed  Removed 1.5 L. Post /80 HR 92.   Complications During Treatment: see above   Ultrafiltration Treatment: Yes  Medications Administered During Dialysis: No  Blood Products Administered During Dialysis: No  Labs Sent During Dialysis: No  Heparin Drip Rate Changes: N/A  Dialysis Catheter Dressing: N/A  Last Dressing Change: N/A

## 2024-05-29 NOTE — RESEARCH NOTES
Artificial Intelligence Monitoring in Nursing (AIMS Nursing) Study    Principle Investigator - Dr. Feliciano Wallace  Research Coordinator - Elaine Boles, RRT     Patient Name - Stacey Heath  Date - 5/29/2024 10:53AM  Location - [place] [address]    Stacey Heath was approached by Elaine Boles RRT to talk about participating in the AIMS Nursing Study. The patient was not able to be approached, a research coordinator will come back at a later time. Study protocol was followed and patient was given study contact information.     Elaine Boles, RRT

## 2024-05-30 ENCOUNTER — APPOINTMENT (OUTPATIENT)
Dept: RADIOLOGY | Facility: HOSPITAL | Age: 54
DRG: 304 | End: 2024-05-30
Payer: COMMERCIAL

## 2024-05-30 ENCOUNTER — APPOINTMENT (OUTPATIENT)
Dept: GASTROENTEROLOGY | Facility: HOSPITAL | Age: 54
End: 2024-05-30
Payer: COMMERCIAL

## 2024-05-30 ENCOUNTER — DOCUMENTATION (OUTPATIENT)
Dept: BEHAVIORAL HEALTH | Facility: CLINIC | Age: 54
End: 2024-05-30
Payer: COMMERCIAL

## 2024-05-30 ENCOUNTER — APPOINTMENT (OUTPATIENT)
Dept: DIALYSIS | Facility: HOSPITAL | Age: 54
End: 2024-05-30
Payer: COMMERCIAL

## 2024-05-30 PROBLEM — Z76.82 KIDNEY TRANSPLANT CANDIDATE: Status: ACTIVE | Noted: 2024-05-30

## 2024-05-30 LAB
25(OH)D3 SERPL-MCNC: 18 NG/ML (ref 30–100)
ANION GAP SERPL CALC-SCNC: 14 MMOL/L (ref 10–20)
CHLORIDE SERPL-SCNC: 97 MMOL/L (ref 98–107)
CO2 SERPL-SCNC: 32 MMOL/L (ref 21–32)
GLUCOSE BLD MANUAL STRIP-MCNC: 123 MG/DL (ref 74–99)
POTASSIUM SERPL-SCNC: 3.3 MMOL/L (ref 3.5–5.3)
SODIUM SERPL-SCNC: 140 MMOL/L (ref 136–145)

## 2024-05-30 PROCEDURE — 97165 OT EVAL LOW COMPLEX 30 MIN: CPT | Mod: GO

## 2024-05-30 PROCEDURE — 82947 ASSAY GLUCOSE BLOOD QUANT: CPT

## 2024-05-30 PROCEDURE — 1200000002 HC GENERAL ROOM WITH TELEMETRY DAILY

## 2024-05-30 PROCEDURE — 82306 VITAMIN D 25 HYDROXY: CPT | Performed by: INTERNAL MEDICINE

## 2024-05-30 PROCEDURE — 97530 THERAPEUTIC ACTIVITIES: CPT | Mod: GP | Performed by: PHYSICAL THERAPIST

## 2024-05-30 PROCEDURE — 74018 RADEX ABDOMEN 1 VIEW: CPT | Performed by: RADIOLOGY

## 2024-05-30 PROCEDURE — 84443 ASSAY THYROID STIM HORMONE: CPT | Performed by: INTERNAL MEDICINE

## 2024-05-30 PROCEDURE — 36415 COLL VENOUS BLD VENIPUNCTURE: CPT | Performed by: INTERNAL MEDICINE

## 2024-05-30 PROCEDURE — 2500000001 HC RX 250 WO HCPCS SELF ADMINISTERED DRUGS (ALT 637 FOR MEDICARE OP): Performed by: INTERNAL MEDICINE

## 2024-05-30 PROCEDURE — 2500000004 HC RX 250 GENERAL PHARMACY W/ HCPCS (ALT 636 FOR OP/ED): Performed by: INTERNAL MEDICINE

## 2024-05-30 PROCEDURE — 74018 RADEX ABDOMEN 1 VIEW: CPT

## 2024-05-30 PROCEDURE — 2500000001 HC RX 250 WO HCPCS SELF ADMINISTERED DRUGS (ALT 637 FOR MEDICARE OP): Performed by: STUDENT IN AN ORGANIZED HEALTH CARE EDUCATION/TRAINING PROGRAM

## 2024-05-30 PROCEDURE — 2500000001 HC RX 250 WO HCPCS SELF ADMINISTERED DRUGS (ALT 637 FOR MEDICARE OP): Performed by: NURSE PRACTITIONER

## 2024-05-30 PROCEDURE — 2500000002 HC RX 250 W HCPCS SELF ADMINISTERED DRUGS (ALT 637 FOR MEDICARE OP, ALT 636 FOR OP/ED): Performed by: INTERNAL MEDICINE

## 2024-05-30 PROCEDURE — 94668 MNPJ CHEST WALL SBSQ: CPT

## 2024-05-30 PROCEDURE — 97161 PT EVAL LOW COMPLEX 20 MIN: CPT | Mod: GP | Performed by: PHYSICAL THERAPIST

## 2024-05-30 PROCEDURE — 2500000001 HC RX 250 WO HCPCS SELF ADMINISTERED DRUGS (ALT 637 FOR MEDICARE OP)

## 2024-05-30 PROCEDURE — 80051 ELECTROLYTE PANEL: CPT | Performed by: INTERNAL MEDICINE

## 2024-05-30 PROCEDURE — 90935 HEMODIALYSIS ONE EVALUATION: CPT | Performed by: INTERNAL MEDICINE

## 2024-05-30 PROCEDURE — 2500000002 HC RX 250 W HCPCS SELF ADMINISTERED DRUGS (ALT 637 FOR MEDICARE OP, ALT 636 FOR OP/ED): Performed by: NURSE PRACTITIONER

## 2024-05-30 PROCEDURE — 8010000001 HC DIALYSIS - HEMODIALYSIS PER DAY

## 2024-05-30 PROCEDURE — 99232 SBSQ HOSP IP/OBS MODERATE 35: CPT | Performed by: INTERNAL MEDICINE

## 2024-05-30 PROCEDURE — 94640 AIRWAY INHALATION TREATMENT: CPT

## 2024-05-30 RX ORDER — LEVOFLOXACIN 500 MG/1
500 TABLET, FILM COATED ORAL
Status: DISCONTINUED | OUTPATIENT
Start: 2024-05-30 | End: 2024-05-31

## 2024-05-30 RX ORDER — ONDANSETRON 4 MG/1
4 TABLET, ORALLY DISINTEGRATING ORAL EVERY 8 HOURS PRN
Status: DISCONTINUED | OUTPATIENT
Start: 2024-05-30 | End: 2024-05-31 | Stop reason: HOSPADM

## 2024-05-30 RX ORDER — TRAMADOL HYDROCHLORIDE 50 MG/1
50 TABLET ORAL ONCE
Status: COMPLETED | OUTPATIENT
Start: 2024-05-30 | End: 2024-05-30

## 2024-05-30 RX ORDER — ONDANSETRON HYDROCHLORIDE 2 MG/ML
4 INJECTION, SOLUTION INTRAVENOUS EVERY 8 HOURS PRN
Status: DISCONTINUED | OUTPATIENT
Start: 2024-05-30 | End: 2024-05-31 | Stop reason: HOSPADM

## 2024-05-30 RX ORDER — DIPHENHYDRAMINE HCL 25 MG
25 CAPSULE ORAL ONCE
Status: COMPLETED | OUTPATIENT
Start: 2024-05-30 | End: 2024-05-30

## 2024-05-30 RX ADMIN — SUCRALFATE 1 G: 1 TABLET ORAL at 15:57

## 2024-05-30 RX ADMIN — APIXABAN 5 MG: 5 TABLET, FILM COATED ORAL at 23:13

## 2024-05-30 RX ADMIN — ASPIRIN 81 MG: 81 TABLET, COATED ORAL at 12:31

## 2024-05-30 RX ADMIN — TORSEMIDE 20 MG: 20 TABLET ORAL at 12:34

## 2024-05-30 RX ADMIN — PANTOPRAZOLE SODIUM 40 MG: 40 TABLET, DELAYED RELEASE ORAL at 12:34

## 2024-05-30 RX ADMIN — ACETAMINOPHEN 650 MG: 325 TABLET ORAL at 10:16

## 2024-05-30 RX ADMIN — APIXABAN 5 MG: 5 TABLET, FILM COATED ORAL at 12:33

## 2024-05-30 RX ADMIN — IPRATROPIUM BROMIDE AND ALBUTEROL SULFATE 3 ML: .5; 3 SOLUTION RESPIRATORY (INHALATION) at 21:42

## 2024-05-30 RX ADMIN — ONDANSETRON 4 MG: 2 INJECTION INTRAMUSCULAR; INTRAVENOUS at 08:51

## 2024-05-30 RX ADMIN — ONDANSETRON 4 MG: 2 INJECTION INTRAMUSCULAR; INTRAVENOUS at 19:56

## 2024-05-30 RX ADMIN — IPRATROPIUM BROMIDE AND ALBUTEROL SULFATE 3 ML: .5; 3 SOLUTION RESPIRATORY (INHALATION) at 12:45

## 2024-05-30 RX ADMIN — ACETAMINOPHEN 650 MG: 325 TABLET ORAL at 20:54

## 2024-05-30 RX ADMIN — SACUBITRIL AND VALSARTAN 1 TABLET: 97; 103 TABLET, FILM COATED ORAL at 12:34

## 2024-05-30 RX ADMIN — CARVEDILOL 50 MG: 12.5 TABLET, FILM COATED ORAL at 12:31

## 2024-05-30 RX ADMIN — ISOSORBIDE MONONITRATE 120 MG: 30 TABLET, EXTENDED RELEASE ORAL at 12:33

## 2024-05-30 RX ADMIN — AMLODIPINE BESYLATE 10 MG: 10 TABLET ORAL at 12:31

## 2024-05-30 RX ADMIN — IPRATROPIUM BROMIDE AND ALBUTEROL SULFATE 3 ML: .5; 3 SOLUTION RESPIRATORY (INHALATION) at 17:08

## 2024-05-30 RX ADMIN — DIPHENHYDRAMINE HYDROCHLORIDE 25 MG: 25 CAPSULE ORAL at 23:13

## 2024-05-30 RX ADMIN — CLONIDINE HYDROCHLORIDE 0.2 MG: 0.1 TABLET ORAL at 12:33

## 2024-05-30 RX ADMIN — LEVOFLOXACIN 500 MG: 500 TABLET, FILM COATED ORAL at 12:31

## 2024-05-30 RX ADMIN — Medication 1 TABLET: at 12:33

## 2024-05-30 RX ADMIN — SUCRALFATE 1 G: 1 TABLET ORAL at 12:33

## 2024-05-30 RX ADMIN — NIFEDIPINE 90 MG: 90 TABLET, FILM COATED, EXTENDED RELEASE ORAL at 12:33

## 2024-05-30 RX ADMIN — TRAMADOL HYDROCHLORIDE 50 MG: 50 TABLET, COATED ORAL at 20:52

## 2024-05-30 RX ADMIN — GABAPENTIN 300 MG: 300 CAPSULE ORAL at 23:13

## 2024-05-30 ASSESSMENT — COGNITIVE AND FUNCTIONAL STATUS - GENERAL
MOBILITY SCORE: 24
DAILY ACTIVITIY SCORE: 21
DAILY ACTIVITIY SCORE: 24
MOVING TO AND FROM BED TO CHAIR: A LITTLE
DAILY ACTIVITIY SCORE: 24
MOVING FROM LYING ON BACK TO SITTING ON SIDE OF FLAT BED WITH BEDRAILS: A LITTLE
CLIMB 3 TO 5 STEPS WITH RAILING: A LOT
MOBILITY SCORE: 17
TOILETING: A LITTLE
HELP NEEDED FOR BATHING: A LITTLE
MOBILITY SCORE: 23
DRESSING REGULAR LOWER BODY CLOTHING: A LITTLE
CLIMB 3 TO 5 STEPS WITH RAILING: A LITTLE
STANDING UP FROM CHAIR USING ARMS: A LITTLE
TURNING FROM BACK TO SIDE WHILE IN FLAT BAD: A LITTLE
WALKING IN HOSPITAL ROOM: A LITTLE

## 2024-05-30 ASSESSMENT — PAIN - FUNCTIONAL ASSESSMENT
PAIN_FUNCTIONAL_ASSESSMENT: 0-10
PAIN_FUNCTIONAL_ASSESSMENT: 0-10
PAIN_FUNCTIONAL_ASSESSMENT: NO/DENIES PAIN
PAIN_FUNCTIONAL_ASSESSMENT: 0-10
PAIN_FUNCTIONAL_ASSESSMENT: 0-10

## 2024-05-30 ASSESSMENT — PAIN SCALES - GENERAL
PAINLEVEL_OUTOF10: 0 - NO PAIN
PAINLEVEL_OUTOF10: 8
PAINLEVEL_OUTOF10: 0 - NO PAIN

## 2024-05-30 ASSESSMENT — PAIN DESCRIPTION - LOCATION
LOCATION: ABDOMEN
LOCATION: HEAD

## 2024-05-30 ASSESSMENT — ACTIVITIES OF DAILY LIVING (ADL)
BATHING_ASSISTANCE: STAND BY
ADL_ASSISTANCE: INDEPENDENT
ADL_ASSISTANCE: NEEDS ASSISTANCE
ADLS_ADDRESSED: NO

## 2024-05-30 NOTE — NURSING NOTE
END OF SHIFT NOTE     Patient remained safe and free from falls during the shift.  Patient complained of stomach pain with nausea.   Refused Tylenol for the pain but requested IV Zofran for nausea.  Pt. has been cooperative and pleasant.  Patient went down to HD today on 1.3 Liters removed due to patient not being able to tolerate.  Patient worked with PT/OT.  No other significant events occurred throughout shift.   Oncoming Nurse will continue to monitor patient.  Call light within reach.     Ron Madrid LPN

## 2024-05-30 NOTE — PROGRESS NOTES
Occupational Therapy    Evaluation    Patient Name: Stacey Heath  MRN: [id number]  Today's Date: 5/30/2024  Room: [address] [place] [address]  Time Calculation  Start Time: 1430  Stop Time: 1442  Time Calculation (min): 12 min    Assessment  IP OT Assessment  OT Assessment: Pt presents with decreased endurance, generalized weakness, and decreased balance inhibiting her independence with ADLs/IADLs. Pt's daily function varies day to day and pt is mostly IND at baseline but requires assistance PRN which she recieves from her fiance. Overall, patient would benefit from skilled OT services at a LOW intensity.  Prognosis: Good  Barriers to Discharge: None  Evaluation/Treatment Tolerance: Patient tolerated treatment well  Medical Staff Made Aware: Yes  End of Session Communication: Bedside nurse  End of Session Patient Position: Bed, 3 rail up, Alarm on  Plan:  Treatment Interventions: ADL retraining, Functional transfer training, Endurance training, Equipment evaluation/education, UE strengthening/ROM, Compensatory technique education  OT Frequency: 2 times per week  OT Discharge Recommendations: Low intensity level of continued care  Equipment Recommended upon Discharge: Other (comment) (Shower chair, bedside commode)  OT Recommended Transfer Status: Assist of 1  OT - OK to Discharge: Yes    Subjective   Current Problem:  1. Hypertensive emergency        2. Shortness of breath        3. Acute on chronic congestive heart failure, unspecified heart failure type (Multi)        4. ESRD (end stage renal disease) on dialysis (Multi)  Colonoscopy Screening; Average Risk Patient      5. Kidney transplant candidate  Colonoscopy Screening; Average Risk Patient        General:  Reason for Referral: Admitted 5/28 due to HTN, dyspnea, chest pain, HA, N/V at dialysis center; dx: hypertensive emergency, acute on chronic CHF, acute on chronic hypoxic respiratory failure, EDRD, hypervolemia  Past Medical History Relevant to Rehab: hypertension,  "diabetes, HFpEF (65% with lVH), COPD (1-2L NC baseline), ESRD, acute pancreatitis, pyelonephritis, ARF, YARA, bilateral shoulder pain, cardiac tamponade, PNA, dry eyes, pancreatitis, sepsis, MI  Prior to Session Communication: Bedside nurse  Patient Position Received: Bed, 3 rail up, Alarm on  Family/Caregiver Present: Yes  Caregiver Feedback: Friend in room and supportive throughout  General Comment: Pt supine in bed upon arrival. Pleasant and agreeable to OT eval. Reporting recently had PT.   Precautions:  Medical Precautions: Cardiac precautions, Fall precautions, Oxygen therapy device and L/min    Pain:  Pain Assessment  Pain Assessment: 0-10  Pain Score: 0 - No pain    Objective   Cognition:  Overall Cognitive Status: Within Functional Limits  Orientation Level: Oriented X4    Home Living:  Type of Home: House  Lives With: Other (Comment) (casandra (works full time) and 15 yo nephew (out of school and can/does assist))  Home Adaptive Equipment: Cane, Walker rolling or standard  Home Layout: Multi-level, Laundry in basement, Bed/bath upstairs, Other (Comment) (1 flight upstairs to bed/bath, 1 flight down to laundry)  Alternate Level Stairs-Rails: Both  Alternate Level Stairs-Number of Steps: 12  Home Access: Stairs to enter with rails  Entrance Stairs-Rails: Both  Entrance Stairs-Number of Steps: 5  Bathroom Shower/Tub: Tub/shower unit  Bathroom Toilet: Standard  Bathroom Equipment: None   Prior Function:  Level of [place]: Independent with ADLs and functional transfers, Independent with homemaking with ambulation  Receives Help From: Other (Comment) (Casandra PRN)  ADL Assistance: Independent (Reports independence most of the time but some days requires assist PRN- variable based on \"fluid build up\")  Homemaking Assistance: Independent  Ambulatory Assistance: Independent (FWW PRN, demonstrated ability to ambulate without AD)  Prior Function Comments: Function varies day to day  IADL History:  Homemaking " "Responsibilities: Yes  Homemaking Comments: All homemaking tasks shared, pt is primary cook/meal prep  Current License: Yes  Mode of Transportation: Car, Family (Reports she drives short distances but fiance will drive farther distances)  Occupation: On disability  Leisure and Hobbies: Color by number, read  ADL:  Eating Assistance: Independent (Anticipate)  Grooming Assistance: Independent (Anticipate)  Bathing Assistance: Stand by (Anticipate)  UE Dressing Assistance: Modified independent (Device) (Anticipate)  UE Dressing Deficit: Increased time to complete  LE Dressing Assistance: Stand by (Anticipate)  Toileting Assistance with Device: Stand by (Anticipate- demonstrated ability to perform perineal care, may require assistance for clothing management on \"bad days\")  Activity Tolerance:  Endurance: Tolerates 30 min exercise with multiple rests  Balance:  Dynamic Sitting Balance  Dynamic Sitting-Comments: SBA  Dynamic Standing Balance  Dynamic Standing-Comments: Akbar-CGA  Static Sitting Balance  Static Sitting-Comment/Number of Minutes: SBA  Static Standing Balance  Static Standing-Comment/Number of Minutes: Akbar-CGA  Bed Mobility/Transfers: Bed Mobility  Bed Mobility: Yes  Bed Mobility 1  Bed Mobility 1: Supine to sitting, Sitting to supine  Level of Assistance 1: Close supervision (SBA)  Bed Mobility Comments 1: HOB elevated, use of bed rails  Functional Mobility  Functional Mobility Performed: Yes  Functional Mobility 1  Comments 1: Pt ambulated MIN household distance in room from bedside to doorway, into bathroom and back using hand held Akbar   and Transfers  Transfer: Yes  Transfer 1  Transfer From 1: Bed to, Stand to  Transfer to 1: Stand, Bed  Technique 1: Sit to stand, Stand to sit  Transfer Device 1:  (Hand held)  Transfer Level of Assistance 1: Minimum assistance, Minimal verbal cues  Transfers 2  Transfer From 2: Stand to, Toilet to  Transfer to 2: Toilet, Stand  Technique 2: Stand to sit, Sit to " stand  Transfer Device 2:  (None)  Transfer Level of Assistance 2: Contact guard, Minimal verbal cues  IADL's:   Homemaking Responsibilities: Yes  Homemaking Comments: All homemaking tasks shared, pt is primary cook/meal prep  Current License: Yes  Mode of Transportation: Car, Family (Reports she drives short distances but fiance will drive farther distances)  Occupation: On disability  Leisure and Hobbies: Color by number, read  Vision: Vision - Basic Assessment  Current Vision: Wears glasses all the time   and Vision - Complex Assessment  Ocular Range of Motion: Within Functional Limits  Sensation:  Light Touch: No apparent deficits (BUEs)  Strength:  Strength Comments: BUE WFL for ADLs/daily tasks (Bilateral shoulder elevation AROM to ~140, PROM to ~150 with pain at end range)  Perception:  Inattention/Neglect: Appears intact  Coordination:  Movements are Fluid and Coordinated: Yes   Hand Function:  Hand Function  Gross Grasp: Functional  Coordination: Functional  Extremities: RUE   RUE : Within Functional Limits  RUE AROM (degrees)  RUE AROM Comment: R shoulder flexion AROM to ~140, PROM to ~150 with pain at end range  RUE Strength  RUE Overall Strength: Within Functional Limits - able to perform ADL tasks with strength, LUE   LUE: Within Functional Limits  LUE AROM (degrees)  LUE AROM Comment: L shoulder flexion AROM to ~140, PROM to ~150 with pain at end range  LUE Strength  LUE Overall Strength: Within Functional Limits - able to perform ADL tasks with strength    Outcome Measures: [facility] Daily Activity  Putting on and taking off regular lower body clothing: A little  Bathing (including washing, rinsing, drying): A little  Putting on and taking off regular upper body clothing: None  Toileting, which includes using toilet, bedpan or urinal: A little  Taking care of personal grooming such as brushing teeth: None  Eating Meals: None  Daily Activity - Total Score: 21    OT Adult Other Outcome Measures  4AT:  -    Education Documentation  Body Mechanics, taught by Yair Anderson OT at 5/30/2024  4:21 PM.  Learner: Patient  Readiness: Acceptance  Method: Explanation, Demonstration  Response: Verbalizes Understanding, Demonstrated Understanding    ADL Training, taught by Yair Anderson OT at 5/30/2024  4:21 PM.  Learner: Patient  Readiness: Acceptance  Method: Explanation, Demonstration  Response: Verbalizes Understanding, Demonstrated Understanding    Education Comments  No comments found.      Goals:     Encounter Problems       Encounter Problems (Active)       ADLs       Pt will tolerate full morning ADL routine with Modified independence and with AE PRN       Start:  05/30/24    Expected End:  06/13/24            Pt will complete UB /LB bathing tasks with modified independence while seated and AE as needed.        Start:  05/30/24    Expected End:  06/13/24               BALANCE       Pt will maintain dynamic standing balance during ADL task with modified independent level of assistance in order to demonstrate decreased risk of falling and improved postural control.       Start:  05/30/24    Expected End:  06/13/24               EXERCISE/STRENGTHENING       Patient will be educated on BUE HEP for increased ADL performance.       Start:  05/30/24    Expected End:  06/13/24               MOBILITY       Patient will perform Functional mobility max Household distances/Community Distances with modified independent level of assistance and least restrictive device in order to improve safety and functional mobility.       Start:  05/30/24    Expected End:  06/13/24 05/30/24 at 4:22 PM   Yair Anderson OT   Rehab Office: [phone]

## 2024-05-30 NOTE — NURSING NOTE
Report from Sending RN:    Report From: Anne Marie ( PONCHO)  Recent Surgery of Procedure: No  Baseline Level of Consciousness (LOC): a/o x 4  Oxygen Use: yes, 2, liters  Type: nc  Diabetic: No  Last BP Med Given Day of Dialysis: none  Last Pain Med Given: none  Lab Tests to be Obtained with Dialysis: no  Blood Transfusion to be Given During Dialysis: No  Available IV Access: Yes  Medications to be Administered During Dialysis: No  Continuous IV Infusion Running: No  Restraints on Currently or in the Last 24 Hours: No acute overnight or morning events; vss; Pt did not take morning medications; Pt will need labs; Pt may go off unit without telemetry; Pt is a full code; Cassidy Gonzalez RN.  Hand-Off Communication: No acute overnight or morning events; vss; Pt did take   Dialysis Catheter Dressing: right IJ  Last Dressing Change:  05/29/2024

## 2024-05-30 NOTE — CONSULTS
"Nutrition Initial Assessment:   Nutrition Assessment    Reason for Assessment: Admission nursing screening    Patient is a 53 y.o. female with PMH of ESRD on HD ([facility]), poorly controlled HTN, ?T2DM, and COPD (baseline L NC) who presented to the ED with hypertensive, shortness of breath, chest pain, headache, and nausea/vomiting.       Nutrition History:  Energy Intake: Poor < 50 %  Food and Nutrient History: Pt reports appetite has been decreased for the past couple months. Reports is consuming 1 meal per day on average. Has Ensure supplements at home and drinks 1 per day or none. Is agreeable to having Ensure supplements during hospital admission. Endorses nausea, abdominal pain, diarrhea. No difficulties chewing or swallowing noted.  Food Allergies/Intolerances:   Bioflavonoids, Shellfish Containing Products  GI Symptoms: Diarrhea, Nausea, and Abdominal pain  Oral Problems: None       Anthropometrics:  Height: 139.7 cm (4' 7\")   Weight: 52.7 kg (116 lb 2.9 oz)   BMI (Calculated): 27  IBW/kg (Dietitian Calculated): 34.1 kg  Percent of IBW: 155 %       Weight History:   Wt Readings from Last 10 Encounters:   05/29/24 52.7 kg (116 lb 2.9 oz)   05/15/24 57.5 kg (126 lb 12.8 oz) - 8.3% loss   04/24/24 59.1 kg (130 lb 6.4 oz) - 10.8% loss   04/22/24 54.9 kg (121 lb)   04/16/24 55.8 kg (123 lb)   04/14/24 54.9 kg (121 lb 0.5 oz)   03/29/24 51.6 kg (113 lb 12.8 oz)   03/29/24 51.6 kg (113 lb 12.8 oz)   03/27/24 52.6 kg (116 lb)   03/18/24 52.6 kg (116 lb)       Weight Change %:  Weight History / % Weight Change: Pt reports usual body wt of 130lb. Reports she thinks she has lost wt over 5 months - represents 10.8% significant wt loss.  Significant Weight Loss: Yes (8.3% loss in 1 month, 10.8% loss in 2 months per wt hx)  Interpretation of Weight Loss: >7.5% in 3 months    Nutrition Focused Physical Exam Findings:    Subcutaneous Fat Loss:   Orbital Fat Pads: Mild-Moderate (slight dark circles and slight " hollowing)  Buccal Fat Pads: Mild-Moderate (flat cheeks, minimal bounce)  Triceps: Mild-Moderate (less than ample fat tissue)  Muscle Wasting:  Temporalis: Mild-Moderate (slight depression)  Pectoralis (Clavicular Region): Mild-Moderate (some protrusion of clavicle)  Deltoid/Trapezius: Mild-Moderate (slight protrusion of acromion process)  Quadriceps: Mild-Moderate (mild depression on inner and outer thigh)  Gastrocnemius: Severe (minimal muscle definition)  Edema:  Edema: none  Physical Findings:  Hair: Negative  Eyes: Negative  Mouth: Negative  Nails: Negative  Skin: Negative    Nutrition Significant Labs:  CBC Trend:   Results from last 7 days   Lab Units 05/28/24  1111   WBC AUTO x10*3/uL 6.2   RBC AUTO x10*6/uL 4.70   HEMOGLOBIN g/dL 12.8   HEMATOCRIT % 39.3   MCV fL 84   PLATELETS AUTO x10*3/uL 177    , BMP Trend:   Results from last 7 days   Lab Units 05/28/24  1111   GLUCOSE mg/dL 74   CALCIUM mg/dL 9.9   SODIUM mmol/L 137   POTASSIUM mmol/L 5.3   CO2 mmol/L 21   CHLORIDE mmol/L 93*   BUN mg/dL 56*   CREATININE mg/dL 12.19*    , BG POCT trend:   Results from last 7 days   Lab Units 05/30/24  0846   POCT GLUCOSE mg/dL 123*    , Renal Lab Trend:   Results from last 7 days   Lab Units 05/28/24  1111   POTASSIUM mmol/L 5.3   SODIUM mmol/L 137   EGFR mL/min/1.73m*2 3*   BUN mg/dL 56*   CREATININE mg/dL 12.19*        Nutrition Specific Medications:  Scheduled medications  fluticasone furoate-vilanteroL, 1 puff, inhalation, Daily  ipratropium-albuteroL, 3 mL, nebulization, 4x daily  isosorbide mononitrate ER, 120 mg, oral, Daily  levoFLOXacin, 500 mg, oral, q48h  multivitamin with minerals, 1 tablet, oral, Daily  pantoprazole, 40 mg, oral, Daily  sucralfate, 1 g, oral, BID AC  torsemide, 20 mg, oral, Daily      I/O:   Last BM Date: 05/29/24;      Dietary Orders (From admission, onward)       Start     Ordered    05/28/24 2244  Adult diet Renal; Potassium Restricted 2 gm (50mEq); 2 - 3 grams Sodium  Diet effective  now        Question Answer Comment   Diet type Renal    Potassium restriction: Potassium Restricted 2 gm (50mEq)    Sodium restriction: 2 - 3 grams Sodium        05/28/24 8919                     Estimated Needs:   Total Energy Estimated Needs (kCal):  (3065-0736)  Method for Estimating Needs: 30kcal/kg of ABW 52.7kg  Total Protein Estimated Needs (g): 65 g  Method for Estimating Needs: 1.2g/kg ABW  Total Fluid Estimated Needs (mL):  (1000mL + UOP)           Nutrition Diagnosis   Malnutrition Diagnosis  Patient has Malnutrition Diagnosis: Yes  Diagnosis Status: New  Malnutrition Diagnosis: Severe malnutrition related to chronic disease or condition  As Evidenced by: suspect patient meeting <75% of estimated energy needs for >1 month; significant weight loss of 8.3% in 1 month, 10.8% loss in 2 months; moderate-severe muscle and subcutaneous fat loss.            Nutrition Interventions/Recommendations         Nutrition Prescription:  Ensure Plus BID (350kcal, 13g protein each).  Consider liberalizing diet to regular to promote PO intake.  Diet texture and consistency per SLP.        Nutrition Interventions:   Interventions: Meals and snacks  Medical Food Supplement: Commercial beverage  Goal: Ensure Plus BID       Nutrition Education:   Encouraged PO intake of oral nutrition supplements.       Nutrition Monitoring and Evaluation   Food/Nutrient Related History Monitoring  Monitoring and Evaluation Plan: Energy intake, Amount of food  Criteria: consume >50% of meals  Amount of Food: Medical food intake  Criteria: consume 100% ONS    Body Composition/Growth/Weight History  Monitoring and Evaluation Plan: Weight  Criteria: stable weight    Biochemical Data, Medical Tests and Procedures  Monitoring and Evaluation Plan: Electrolyte/renal panel, Glucose/endocrine profile  Criteria: labs WNL            Time Spent/Follow-up Reminder:   Time Spent (min): 45 minutes  Last Date of Nutrition Visit: 05/30/24  Nutrition Follow-Up  Needed?: Dietitian to reassess per policy

## 2024-05-30 NOTE — PROGRESS NOTES
This provider met with patient at [facility]  on May 29th, 2024 since patient was admitted. This provider worked on relationship building and coordinating care.     Start time: 2:20pm  End time: 3:05pm

## 2024-05-30 NOTE — PROGRESS NOTES
Stacey Heath is a 53 y.o. female on day 2 of admission presenting with Hypertensive emergency.      Subjective   Seen on HD. UF had to be held as she became nauseated.     Objective   Vitals 24HR  Heart Rate:  [64-72]   Temp:  [36 °C (96.8 °F)-36.5 °C (97.7 °F)]   Resp:  [16-18]   BP: (105-131)/(51-67)   SpO2:  [90 %-99 %]     Intake/Output last 3 Shifts:    Intake/Output Summary (Last 24 hours) at 5/30/2024 0942  Last data filed at 5/30/2024 0835  Gross per 24 hour   Intake 500 ml   Output --   Net 500 ml       Physical Exam  No distress  HEENT:  moist, no pallor  No edema dar LE  Breath sounds reduced bibasally, clear  S1 S2 regular, normal, no rub or murmur  Abdomen soft  AAO x3, non focal    amLODIPine, 10 mg, oral, Daily  apixaban, 5 mg, oral, BID  aspirin, 81 mg, oral, Daily  atorvastatin, 80 mg, oral, Nightly  carvedilol, 50 mg, oral, BID  cloNIDine, 0.2 mg, oral, q8h BELEN  fluticasone furoate-vilanteroL, 1 puff, inhalation, Daily  gabapentin, 300 mg, oral, Nightly  hydrALAZINE, 100 mg, oral, TID  ipratropium-albuteroL, 3 mL, nebulization, 4x daily  isosorbide mononitrate ER, 120 mg, oral, Daily  levoFLOXacin, 500 mg, oral, q48h  multivitamin with minerals, 1 tablet, oral, Daily  NIFEdipine ER, 90 mg, oral, Daily before breakfast  pantoprazole, 40 mg, oral, Daily  sacubitriL-valsartan, 1 tablet, oral, BID  sucralfate, 1 g, oral, BID AC  torsemide, 20 mg, oral, Daily      Assessment/Plan   53 Year old with h/o ESRD on HD [place] [facility]  [place] following for ESRD    #ESRD: Tolerating HD per submitted orders, 2 K,  2.5 Ca, UF goal was 3 L but only tolerated ~net 500 cc UF    # Anemia: HgB   Hemoglobin   Date Value Ref Range Status   05/28/2024 12.8 12.0 - 16.0 g/dL Final   continue to hold SOFÍA    # MBD: Ca   Calcium   Date Value Ref Range Status   05/28/2024 9.9 8.6 - 10.6 mg/dL Final     Phosphorus   Date Value Ref Range Status   04/14/2024 5.0 (H) 2.5 - 4.9 mg/dL Final     Comment:     The performance  characteristics of phosphorus testing in heparinized plasma have been validated by the individual  laboratory site where testing is performed. Testing on heparinized plasma is not approved by the FDA; however, such approval is not necessary.     PTH   Date Value Ref Range Status   05/18/2021 305.1 (H) 18.5 - 88.0 pg/mL Final     Vitamin D, 25-Hydroxy   Date Value Ref Range Status   05/18/2021 8 (A) ng/mL Final     Comment:     .  DEFICIENCY:         < 20   NG/ML  INSUFFICIENCY:      20-29  NG/ML  SUFFICIENCY:         NG/ML    THIS ASSAY ACCURATELY QUANTIFIES THE SUM OF  VITAMIN D3, 25-HYDROXY AND VIT D2,25-HYDROXY.     Not on PO4 binders    #Hypertension:  Bp control acceptable/poor, continue current anti-hypertensive medications    #Volume status: Clinically euvolemic continue UF as tolerated    Will continue to follow

## 2024-05-30 NOTE — PROGRESS NOTES
Physical Therapy    Physical Therapy Evaluation    Patient Name: Stacey Heath  MRN: [id number]  Today's Date: 5/30/2024   Time Calculation  Start Time: 1339  Stop Time: 1409  Time Calculation (min): 30 min    Assessment/Plan   PT Assessment  PT Assessment Results: Decreased strength, Decreased endurance, Impaired balance, Decreased mobility, Pain, Decreased range of motion (decreased endurance)  Rehab Prognosis: Good  Evaluation/Treatment Tolerance: Patient limited by fatigue  Medical Staff Made Aware: Yes  Strengths: Ability to acquire knowledge, Attitude of self, Coping skills, Support of Caregivers, Premorbid level of function  Barriers to Participation: Other (Comment) (dizziness and fatigue as well as grogginess)  End of Session Communication: Bedside nurse, PCT/NA/CTA (OT)  Assessment Comment: 54 yo female with HTN, acute on chronic CHF and respiratory failure, ESRD and hypovolemia presents with fatigue, dizziness, balance deficits decreased activity tolerance and weakness overall. Recommend home with assist and low intensity home therapy as well as issue of suggested equipment.  End of Session Patient Position: Bed, 3 rail up, Alarm on  IP OR SWING BED PT PLAN  Inpatient or Swing Bed: Inpatient  PT Plan  Treatment/Interventions: Bed mobility, Gait training, Transfer training, Stair training, Balance training, Neuromuscular re-education, Strengthening, Endurance training, Therapeutic exercise, Therapeutic activity, Home exercise program, Postural re-education  PT Plan: Skilled PT  PT Frequency: 4 times per week  PT Discharge Recommendations: Low intensity level of continued care  Equipment Recommended upon Discharge: Other (comment) (bedside commode for first floor, shower chair, transport WC)  PT Recommended Transfer Status: Assist x1, Assistive device (CG/min A)  PT - OK to Discharge: Yes (PT eval completed & DC recs made)      Subjective   General Visit Information:  General  Reason for Referral: Admitted  5/28 due to HTN, dyspnea, chest pain, HA, N/V at dialysis center; dx: hypertensive emergency, acute on chronic CHF, acute on chronic hypoxic respiratory failure, EDRD, hypervolemia  Past Medical History Relevant to Rehab: hypertension, diabetes, HFpEF (65% with lVH), COPD (1-2L NC baseline), ESRD, acute pancreatitis, pyelonephritis, ARF, YARA, bilateral shoulder pain, cardiac tamponade, PNA, dry eyes, pancreatitis, sepsis, MI  Missed Visit: No  Family/Caregiver Present: No  Co-Treatment:  (N/A)  Co-Treatment Reason:  (N/A)  Prior to Session Communication: Bedside nurse, PCT/NA/CTA  Patient Position Received: Bed, 3 rail up, Alarm off, not on at start of session  Preferred Learning Style: verbal  General Comment: Pt supine very engaged, a little groggy and dizzy at times (had dialysis today and just finished washing up)  Home Living:  Home Living  Type of Home: House  Lives With: Other (Comment) (juanpablo (works full time) and 15 yo nephew (out of school and can/does assist))  Home Adaptive Equipment:  (WW and cane)  Home Layout: Multi-level (5 NIYA with 2 rails (far apart), flight with 1 rail toe bed/tub combo (no equip); laundry down in basement (flight with 1 rail; alissaance does it))  Prior Level of Function:  Prior Function Per Pt/Caregiver Report  Level of [place]:  (variable- pt sometimes feels bad and needs more assist; amb ind (no device but furniture walks indoors; uses WW outdoors), no falls, SBA for stairclimbing)  Receives Help From: Other (Comment) (juanpablo and 15 yo nephew)  ADL Assistance: Needs assistance (variable- dependent on how she feels; dress/shower ind sometimes/sometimes needs assist)  Homemaking Assistance:  (alissaance drives but both shop for groceries, fiance cleans, pt cooks)  Vocational: On disability  Hand Dominance: Left  Prior Function Comments: does well, variable amounts of assist needed dependent on how she feels; is not on oxygen at home  Precautions:  Precautions  Medical Precautions:  Cardiac precautions, Fall precautions, Oxygen therapy device and L/min (DM)  Vital Signs:  Vital Signs  Heart Rate:  (sitting EOB mid session: B P 136/67  HR 81  O2 97%)  BP Location: Left arm  Patient Position: Sitting    Objective   Pain:  Pain Assessment  Pain Assessment: 0-10  Pain Score:  (pain with ROM shoulders (L > R) not rated)  Pain Interventions: Ambulation/increased activity, Therapeutic presence, Therapeutic touch, Rest  Response to Interventions: comfortable supine end of session  Cognition:  Cognition  Overall Cognitive Status: Within Functional Limits  Arousal/Alertness: Appropriate responses to stimuli  Orientation Level: Oriented X4  Following Commands: Follows all commands and directions without difficulty    General Assessments:                  Activity Tolerance  Endurance: Tolerates 10 - 20 min exercise with multiple rests, Other (Comment) (groggy/dizzy today (likely fatigued from HD))    Sensation  Light Touch: Not tested       Postural Control  Postural Control: Impaired  Posture Comment: mild upper thoracic kyphosis and rounded shoulders    Static Sitting Balance  Static Sitting-Balance Support: Bilateral upper extremity supported, Feet supported  Static Sitting-Level of Assistance: Distant supervision  Dynamic Sitting Balance  Dynamic Sitting-Balance Support: Feet supported, No upper extremity supported  Dynamic Sitting-Balance: Reaching for objects  Dynamic Sitting-Comments: SBA, no LOB, good safety awareness    Static Standing Balance  Static Standing-Balance Support: Bilateral upper extremity supported (on WW)  Static Standing-Level of Assistance: Contact guard  Dynamic Standing Balance  Dynamic Standing-Balance Support: Bilateral upper extremity supported (on WW)  Dynamic Standing-Balance:  (transfers, gait as noted)  Dynamic Standing-Comments: CG/min A  Functional Assessments:  ADL  ADL's Addressed: No    Bed Mobility  Bed Mobility: Yes  Bed Mobility 1  Bed Mobility 1: Supine to  sitting, Sitting to supine  Level of Assistance 1: Minimum assistance, Minimal verbal cues  Bed Mobility Comments 1: HOB elevated 35 degrees, use of rail, in/out on Right side    Transfers  Transfer: Yes  Transfer 1  Transfer From 1: Sit to, Stand to  Transfer to 1: Sit, Stand  Technique 1: Sit to stand, Stand to sit  Transfer Device 1: Walker  Transfer Level of Assistance 1: Minimum assistance, Minimal verbal cues (CG to min A)  Transfers 2  Transfer From 2: Stand to, Chair with arms to  Transfer to 2: Stand, Chair with arms  Technique 2: Stand pivot, Sit to stand, Stand to sit  Transfer Device 2: Walker  Transfer Level of Assistance 2: Minimum assistance, Minimal verbal cues    Ambulation/Gait Training  Ambulation/Gait Training Performed: Yes  Ambulation/Gait Training 1  Surface 1: Level tile  Device 1: Rolling walker  Assistance 1: Minimum assistance, Minimal verbal cues  Quality of Gait 1:  (flexed upper trunk, small steps, slow, intermittent assist with WW)  Comments/Distance (ft) 1: 10 (limited by fatigue and dizziness today)    Stairs  Stairs: No (pt too fatigued to do today)  Extremity/Trunk Assessments:  RUE   RUE : Exceptions to WFL  RUE AROM (degrees)  RUE AROM Comment: grasp & elbow WFL; AAROM shoulder elevation approxiamtely 140 (pain, hx of issues/sx)  RUE Strength  RUE Overall Strength:  (grasp and elbow flex/ext 4/5; shoulder elevation >3- in limited ROM (pain))  LUE   LUE: Exceptions to WFL  LUE AROM (degrees)  LUE AROM Comment: grasp, elbow flex/ext grossly WFL; shoulder elevation approximately 85 degrees AAROM (limited by pain, chronic, hx of issues/sx)  LUE Strength  LUE Overall Strength:  (grasp and elbow flex/ext grossly 4/5; shoulder elevation 3- in limited ROM (pain as noted))  RLE   RLE : Exceptions to WFL  Strength RLE  RLE Overall Strength:  (ankle DF and knee ext grossly 4+)  LLE   LLE : Exceptions to WFL  Strength LLE  LLE Overall Strength:  (ankle DF and knee ext grossly 4+)  Outcome  Measures:  [facility] Basic Mobility  Turning from your back to your side while in a flat bed without using bedrails: A little  Moving from lying on your back to sitting on the side of a flat bed without using bedrails: A little  Moving to and from bed to chair (including a wheelchair): A little  Standing up from a chair using your arms (e.g. wheelchair or bedside chair): A little  To walk in hospital room: A little  Climbing 3-5 steps with railing: A lot  Basic Mobility - Total Score: 17    Encounter Problems       Encounter Problems (Active)       General Goals       independent with HEP (Not Progressing)       Start:  05/30/24    Expected End:  06/13/24            supine to/from sit, HOB flat no rail, modified independent (Progressing)       Start:  05/30/24    Expected End:  06/13/24            sit to/from stand, bed to/from chair with WW modified independent (Progressing)       Start:  05/30/24    Expected End:  06/13/24               Mobility       ambulate 150' with WW with supervision, no LOB (Progressing)       Start:  05/30/24    Expected End:  06/13/24            up/down 12 steps with 1 rail and min A, rest breaks as needed, 1 step at a time (Not Progressing)       Start:  05/30/24    Expected End:  06/13/24                   Education Documentation  Precautions, taught by Honey Hernandez PT at 5/30/2024  2:17 PM.  Learner: Patient  Readiness: Acceptance  Method: Explanation, Demonstration  Response: Needs Reinforcement, Demonstrated Understanding, Verbalizes Understanding  Comment: PT purpose/POC, safe transfers/gait, DC recs, vitals    Body Mechanics, taught by Honey Hernandez PT at 5/30/2024  2:17 PM.  Learner: Patient  Readiness: Acceptance  Method: Explanation, Demonstration  Response: Needs Reinforcement, Demonstrated Understanding, Verbalizes Understanding  Comment: PT purpose/POC, safe transfers/gait, DC recs, vitals    Mobility Training, taught by Honey Hernandez PT at 5/30/2024  2:17  PM.  Learner: Patient  Readiness: Acceptance  Method: Explanation, Demonstration  Response: Needs Reinforcement, Demonstrated Understanding, Verbalizes Understanding  Comment: PT purpose/POC, safe transfers/gait, DC recs, vitals    Education Comments  No comments found.

## 2024-05-30 NOTE — NURSING NOTE
Report to Receiving RN:    Report To: Carlos Manuel MELVIN  Time Report Called: 1030  Hand-Off Communication: Tolerated 1.3L of ordered 3L fluid removal. Post BP: 162/70 R: 84. Random BS@6356=169. Nephrology updated  Complications During Treatment: No  Ultrafiltration Treatment: Yes  Medications Administered During Dialysis: Yes, PRN Zofran 4mg for nausea. PRN Tylenol 650mg for headache  Blood Products Administered During Dialysis: No  Labs Sent During Dialysis: No  Heparin Drip Rate Changes: N/A  Dialysis Catheter Dressing: ELI fistula  Last Dressing Change: N/A      Last Updated: 10:22 AM by WALT APRIL

## 2024-05-30 NOTE — PROGRESS NOTES
Stacey Heath is a 53 y.o. female on day 2 of admission presenting with Hypertensive emergency.      Dietary Orders (From admission, onward)               Adult diet Renal; Potassium Restricted 2 gm (50mEq); 2 - 3 grams Sodium  Diet effective now        Question Answer Comment   Diet type Renal    Potassium restriction: Potassium Restricted 2 gm (50mEq)    Sodium restriction: 2 - 3 grams Sodium                          Objective     Vitals  Temp:  [36 °C (96.8 °F)-36.5 °C (97.7 °F)] 36.1 °C (97 °F)  Heart Rate:  [64-72] 72  Resp:  [16-18] 18  BP: (105-131)/(51-67) 131/57  FiO2 (%):  [28 %] 28 %       Pain Score: 1         Peripheral IV 05/28/24 20 G Right;Anterior External Jugular (Active)   Number of days: 1       Vent Settings  FiO2 (%):  [28 %] 28 %    Intake/Output Summary (Last 24 hours) at 5/30/2024 0844  Last data filed at 5/30/2024 0640  Gross per 24 hour   Intake 400 ml   Output --   Net 400 ml       Relevant Results       Scheduled medications  amLODIPine, 10 mg, oral, Daily  apixaban, 5 mg, oral, BID  aspirin, 81 mg, oral, Daily  atorvastatin, 80 mg, oral, Nightly  carvedilol, 50 mg, oral, BID  cloNIDine, 0.2 mg, oral, q8h BELEN  fluticasone furoate-vilanteroL, 1 puff, inhalation, Daily  gabapentin, 300 mg, oral, Nightly  hydrALAZINE, 100 mg, oral, TID  ipratropium-albuteroL, 3 mL, nebulization, 4x daily  isosorbide mononitrate ER, 120 mg, oral, Daily  multivitamin with minerals, 1 tablet, oral, Daily  NIFEdipine ER, 90 mg, oral, Daily before breakfast  pantoprazole, 40 mg, oral, Daily  sacubitriL-valsartan, 1 tablet, oral, BID  sucralfate, 1 g, oral, BID AC  torsemide, 20 mg, oral, Daily      Continuous medications     PRN medications  PRN medications: acetaminophen **OR** acetaminophen **OR** acetaminophen, hydrALAZINE, hydrOXYzine HCL, melatonin, metoclopramide, ondansetron ODT **OR** ondansetron, polyethylene glycol                Assessment/Plan                    S/  See and examined  No new  complaints no new event over the night   ROS: Negative    O/   General Appearance: Alert, Oriented X3, Cooperative, Not in Acute Distress  HEENT: no eye redness, not pale, no jaundice, Throat: not congested, no ulcers    Chest: Good AE bilateral, dar  crackles, ronchies, wheezes.   Heart: RHR, Normal heart sounds S1, S2, no murmur or gallop,   Abdomen: Soft, lax, no hepatosplenomegaly, intact hernia orifices, negative pereira sign, no tenderness,   Genitalia: no abnormal discharge, no ulcers, no swelling.  Lymphatics: no lymphadenopathy, supraclavicular, inguinal trochlear, or axilla.   Neurology: Intact cranial nerves 2 to 12, intact sensation, intact motor function (Power, tone and reflexes). Normal DTR reflexes.   Extremities: no signs of PAD, no swelling no ulcers   Back: no deformity, no SI tenderness, no ulcers.   Skin: no signs of dehydration, no Rash, Bruises, or purpura.      AS:    Ms. Stacey Heath is a 53 y.o. female with pmhx of ESRD on HD (TTS), HTN, T2DM, and COPD who was admitted for hypertensive emergency, signs of decompensated HF due to volume overloaded, didn't completed her last session of HD due to cramps.  Nephrology consulted. Will c/w BP control, and HD. Has some signs of volume overload, not significant, c/w Nausea due to uremia, Nephrology plan to HD on 5/30.   Clinically improving, BP more controlled, Less cramps on HD, will plan to DC tomorrow if no further HD by Nephro.     #Hypertensive emergency: improving   #HD due to Hypervolemia  -Markedly hypertensive on arrival > 200  -Given home medications,    -Will order hydralazine 10mg Q6HP SBP > 170 or DBP > 100  -Resume home medications  -Telemetry    #Acute on chronic hypoxic respiratory failure likely due to volume overload   -Imaging with evidence of fluid overload  - CT: small loculated left pleural effusion and associated left lower lobe subsegmental consolidative opacity favoring to represent atelectasis. There is a new subsegmental  consolidation in the right middle lobe. This may also represent atelectasis, however developing pneumonia is not excluded.   -Reported to be on 4L NC pre-HD, on arrival 6L  -Continuous SPO2 monitoring  -c/w home meds, entresto, meto, isosorbid, GDMT as tolerated   - breathing rx  - kathie Spirometry   - ADD aBX pending inflammatory markers, and clinical course      #ESRD on HD  -Underwent HD in ED  -Consult nephrology in AM for continued HD management  -Renal diet  -Renal dosing where indicated     #T2DM  -Per EMR  -Last a1c 3.7 on 3/2024  -No SSI    Code Status: Full  DVT ppx:  on apixaban   Disp: PT/ot     Spoke to my patient, Discussed the medical, social conditions, the reason for this admission explained our plan and recommendations.  Time spent on the assessment of patient, gathering and interpreting data, review of medical record/patient history, personally reviewing radiographic imaging. With greater than 50% spent in personal discussion with patient.  Time > 35  min        Hai Yi MD

## 2024-05-30 NOTE — RESEARCH NOTES
Artificial Intelligence Monitoring in Nursing (AIMS Nursing) Study    Principle Investigator - Dr. Feliciano Wallace  Research Coordinator - Colt Ferrera RN     Patient Name - Stacey Heath  Date - 5/30/2024 4:01 PM  Location - [address]    Stacey Heath was approached by Colt Ferrera RN to talk about participating in the AIMS Nursing Study. The patient was not able to be approached, a research coordinator will come back at a later time. Study protocol was followed and patient was given study contact information.     Colt Ferrera RN

## 2024-05-31 ENCOUNTER — PHARMACY VISIT (OUTPATIENT)
Dept: PHARMACY | Facility: CLINIC | Age: 54
End: 2024-05-31
Payer: COMMERCIAL

## 2024-05-31 VITALS
WEIGHT: 116.18 LBS | SYSTOLIC BLOOD PRESSURE: 117 MMHG | HEART RATE: 71 BPM | HEIGHT: 55 IN | BODY MASS INDEX: 26.89 KG/M2 | OXYGEN SATURATION: 97 % | RESPIRATION RATE: 18 BRPM | DIASTOLIC BLOOD PRESSURE: 67 MMHG | TEMPERATURE: 96.3 F

## 2024-05-31 LAB
ALBUMIN SERPL BCP-MCNC: 3.4 G/DL (ref 3.4–5)
ALBUMIN SERPL BCP-MCNC: 3.9 G/DL (ref 3.4–5)
ALP SERPL-CCNC: 94 U/L (ref 33–110)
ALT SERPL W P-5'-P-CCNC: 13 U/L (ref 7–45)
ANION GAP SERPL CALC-SCNC: 21 MMOL/L (ref 10–20)
ANION GAP SERPL CALC-SCNC: 9 MMOL/L (ref 10–20)
AST SERPL W P-5'-P-CCNC: 22 U/L (ref 9–39)
BILIRUB SERPL-MCNC: 0.3 MG/DL (ref 0–1.2)
BUN SERPL-MCNC: 44 MG/DL (ref 6–23)
BUN SERPL-MCNC: 48 MG/DL (ref 6–23)
CALCIUM SERPL-MCNC: 9.3 MG/DL (ref 8.6–10.6)
CALCIUM SERPL-MCNC: 9.7 MG/DL (ref 8.6–10.6)
CHLORIDE SERPL-SCNC: 75 MMOL/L (ref 98–107)
CHLORIDE SERPL-SCNC: 95 MMOL/L (ref 98–107)
CO2 SERPL-SCNC: 24 MMOL/L (ref 21–32)
CO2 SERPL-SCNC: 28 MMOL/L (ref 21–32)
CREAT SERPL-MCNC: 8.3 MG/DL (ref 0.5–1.05)
CREAT SERPL-MCNC: 8.59 MG/DL (ref 0.5–1.05)
EGFRCR SERPLBLD CKD-EPI 2021: 5 ML/MIN/1.73M*2
EGFRCR SERPLBLD CKD-EPI 2021: 5 ML/MIN/1.73M*2
ERYTHROCYTE [DISTWIDTH] IN BLOOD BY AUTOMATED COUNT: 16 % (ref 11.5–14.5)
GLUCOSE SERPL-MCNC: 221 MG/DL (ref 74–99)
GLUCOSE SERPL-MCNC: 74 MG/DL (ref 74–99)
HCT VFR BLD AUTO: 40.6 % (ref 36–46)
HGB BLD-MCNC: 12.9 G/DL (ref 12–16)
MCH RBC QN AUTO: 27.4 PG (ref 26–34)
MCHC RBC AUTO-ENTMCNC: 31.8 G/DL (ref 32–36)
MCV RBC AUTO: 86 FL (ref 80–100)
NRBC BLD-RTO: 0 /100 WBCS (ref 0–0)
PHOSPHATE SERPL-MCNC: 6 MG/DL (ref 2.5–4.9)
PLATELET # BLD AUTO: 266 X10*3/UL (ref 150–450)
POTASSIUM SERPL-SCNC: 3.6 MMOL/L (ref 3.5–5.3)
POTASSIUM SERPL-SCNC: 4.5 MMOL/L (ref 3.5–5.3)
PROT SERPL-MCNC: 7.5 G/DL (ref 6.4–8.2)
PTH-INTACT SERPL-MCNC: 1415 PG/ML (ref 18.5–88)
RBC # BLD AUTO: 4.71 X10*6/UL (ref 4–5.2)
SODIUM SERPL-SCNC: 108 MMOL/L (ref 136–145)
SODIUM SERPL-SCNC: 135 MMOL/L (ref 136–145)
TSH SERPL-ACNC: 1.83 MIU/L (ref 0.44–3.98)
WBC # BLD AUTO: 5.6 X10*3/UL (ref 4.4–11.3)

## 2024-05-31 PROCEDURE — 36415 COLL VENOUS BLD VENIPUNCTURE: CPT | Performed by: INTERNAL MEDICINE

## 2024-05-31 PROCEDURE — 2500000002 HC RX 250 W HCPCS SELF ADMINISTERED DRUGS (ALT 637 FOR MEDICARE OP, ALT 636 FOR OP/ED): Performed by: NURSE PRACTITIONER

## 2024-05-31 PROCEDURE — 94640 AIRWAY INHALATION TREATMENT: CPT

## 2024-05-31 PROCEDURE — 99239 HOSP IP/OBS DSCHRG MGMT >30: CPT | Performed by: INTERNAL MEDICINE

## 2024-05-31 PROCEDURE — 85027 COMPLETE CBC AUTOMATED: CPT | Performed by: INTERNAL MEDICINE

## 2024-05-31 PROCEDURE — 2500000002 HC RX 250 W HCPCS SELF ADMINISTERED DRUGS (ALT 637 FOR MEDICARE OP, ALT 636 FOR OP/ED): Mod: MUE | Performed by: INTERNAL MEDICINE

## 2024-05-31 PROCEDURE — 83970 ASSAY OF PARATHORMONE: CPT | Performed by: INTERNAL MEDICINE

## 2024-05-31 PROCEDURE — 80053 COMPREHEN METABOLIC PANEL: CPT | Performed by: INTERNAL MEDICINE

## 2024-05-31 PROCEDURE — 2500000001 HC RX 250 WO HCPCS SELF ADMINISTERED DRUGS (ALT 637 FOR MEDICARE OP): Performed by: INTERNAL MEDICINE

## 2024-05-31 PROCEDURE — 80069 RENAL FUNCTION PANEL: CPT | Mod: CCI | Performed by: INTERNAL MEDICINE

## 2024-05-31 PROCEDURE — 99233 SBSQ HOSP IP/OBS HIGH 50: CPT | Performed by: NURSE PRACTITIONER

## 2024-05-31 PROCEDURE — 2500000001 HC RX 250 WO HCPCS SELF ADMINISTERED DRUGS (ALT 637 FOR MEDICARE OP)

## 2024-05-31 PROCEDURE — 2500000001 HC RX 250 WO HCPCS SELF ADMINISTERED DRUGS (ALT 637 FOR MEDICARE OP): Performed by: NURSE PRACTITIONER

## 2024-05-31 PROCEDURE — RXMED WILLOW AMBULATORY MEDICATION CHARGE

## 2024-05-31 RX ORDER — FLUTICASONE FUROATE AND VILANTEROL 100; 25 UG/1; UG/1
1 POWDER RESPIRATORY (INHALATION)
Qty: 60 EACH | Refills: 0 | Status: SHIPPED | OUTPATIENT
Start: 2024-06-01 | End: 2024-07-01

## 2024-05-31 RX ORDER — AMLODIPINE BESYLATE 10 MG/1
10 TABLET ORAL DAILY
Qty: 30 TABLET | Refills: 0 | Status: SHIPPED | OUTPATIENT
Start: 2024-06-01 | End: 2024-07-01

## 2024-05-31 RX ORDER — SUMATRIPTAN SUCCINATE 25 MG/1
25 TABLET ORAL ONCE
Status: COMPLETED | OUTPATIENT
Start: 2024-05-31 | End: 2024-05-31

## 2024-05-31 RX ORDER — PANTOPRAZOLE SODIUM 40 MG/1
40 TABLET, DELAYED RELEASE ORAL
Status: DISCONTINUED | OUTPATIENT
Start: 2024-05-31 | End: 2024-05-31 | Stop reason: HOSPADM

## 2024-05-31 RX ORDER — ACETAMINOPHEN 325 MG/1
650 TABLET ORAL EVERY 4 HOURS PRN
Qty: 30 TABLET | Refills: 0 | Status: SHIPPED | OUTPATIENT
Start: 2024-05-31

## 2024-05-31 RX ORDER — ACETAMINOPHEN 325 MG/1
650 TABLET ORAL EVERY 4 HOURS PRN
Status: DISCONTINUED | OUTPATIENT
Start: 2024-05-31 | End: 2024-05-31 | Stop reason: HOSPADM

## 2024-05-31 RX ORDER — ACETAMINOPHEN 500 MG
5 TABLET ORAL NIGHTLY PRN
Qty: 20 TABLET | Refills: 0 | Status: SHIPPED | OUTPATIENT
Start: 2024-05-31 | End: 2024-06-30

## 2024-05-31 RX ORDER — SUMATRIPTAN 50 MG/1
50 TABLET, FILM COATED ORAL ONCE
Status: DISCONTINUED | OUTPATIENT
Start: 2024-05-31 | End: 2024-05-31

## 2024-05-31 RX ORDER — ACETAMINOPHEN 650 MG/1
650 SUPPOSITORY RECTAL EVERY 4 HOURS PRN
Status: DISCONTINUED | OUTPATIENT
Start: 2024-05-31 | End: 2024-05-31 | Stop reason: HOSPADM

## 2024-05-31 RX ORDER — ONDANSETRON 4 MG/1
4 TABLET, ORALLY DISINTEGRATING ORAL EVERY 12 HOURS PRN
Qty: 20 TABLET | Refills: 0 | Status: SHIPPED | OUTPATIENT
Start: 2024-05-31 | End: 2024-06-30

## 2024-05-31 RX ORDER — TRAMADOL HYDROCHLORIDE 50 MG/1
25 TABLET ORAL ONCE
Status: COMPLETED | OUTPATIENT
Start: 2024-05-31 | End: 2024-05-31

## 2024-05-31 RX ORDER — ACETAMINOPHEN 160 MG/5ML
650 SOLUTION ORAL EVERY 4 HOURS PRN
Status: DISCONTINUED | OUTPATIENT
Start: 2024-05-31 | End: 2024-05-31 | Stop reason: HOSPADM

## 2024-05-31 RX ORDER — POLYETHYLENE GLYCOL 3350 17 G/17G
17 POWDER, FOR SOLUTION ORAL DAILY PRN
Qty: 238 G | Refills: 0 | Status: SHIPPED | OUTPATIENT
Start: 2024-05-31

## 2024-05-31 RX ORDER — CALCIUM ACETATE 667 MG/1
1334 CAPSULE ORAL
Qty: 180 CAPSULE | Refills: 0 | Status: SHIPPED | OUTPATIENT
Start: 2024-05-31 | End: 2024-06-30

## 2024-05-31 RX ORDER — CALCIUM ACETATE 667 MG/1
1334 CAPSULE ORAL
Status: DISCONTINUED | OUTPATIENT
Start: 2024-05-31 | End: 2024-05-31 | Stop reason: HOSPADM

## 2024-05-31 RX ORDER — SUMATRIPTAN SUCCINATE 25 MG/1
25 TABLET ORAL
Qty: 5 TABLET | Refills: 0 | Status: SHIPPED | OUTPATIENT
Start: 2024-05-31 | End: 2024-06-13

## 2024-05-31 RX ADMIN — AMLODIPINE BESYLATE 10 MG: 10 TABLET ORAL at 09:13

## 2024-05-31 RX ADMIN — TRAMADOL HYDROCHLORIDE 25 MG: 50 TABLET, COATED ORAL at 12:16

## 2024-05-31 RX ADMIN — ACETAMINOPHEN 650 MG: 325 TABLET ORAL at 09:11

## 2024-05-31 RX ADMIN — TORSEMIDE 20 MG: 20 TABLET ORAL at 09:12

## 2024-05-31 RX ADMIN — PANTOPRAZOLE SODIUM 40 MG: 40 TABLET, DELAYED RELEASE ORAL at 09:13

## 2024-05-31 RX ADMIN — CALCIUM ACETATE 1334 MG: 667 CAPSULE ORAL at 12:16

## 2024-05-31 RX ADMIN — SUCRALFATE 1 G: 1 TABLET ORAL at 06:45

## 2024-05-31 RX ADMIN — IPRATROPIUM BROMIDE AND ALBUTEROL SULFATE 3 ML: .5; 3 SOLUTION RESPIRATORY (INHALATION) at 13:54

## 2024-05-31 RX ADMIN — SUMATRIPTAN 25 MG: 25 TABLET, FILM COATED ORAL at 14:18

## 2024-05-31 RX ADMIN — NIFEDIPINE 90 MG: 90 TABLET, FILM COATED, EXTENDED RELEASE ORAL at 06:45

## 2024-05-31 RX ADMIN — CLONIDINE HYDROCHLORIDE 0.2 MG: 0.1 TABLET ORAL at 06:45

## 2024-05-31 RX ADMIN — APIXABAN 5 MG: 5 TABLET, FILM COATED ORAL at 09:13

## 2024-05-31 RX ADMIN — HYDRALAZINE HYDROCHLORIDE 100 MG: 25 TABLET ORAL at 09:13

## 2024-05-31 RX ADMIN — ASPIRIN 81 MG: 81 TABLET, COATED ORAL at 09:13

## 2024-05-31 RX ADMIN — CARVEDILOL 50 MG: 12.5 TABLET, FILM COATED ORAL at 09:13

## 2024-05-31 RX ADMIN — IPRATROPIUM BROMIDE AND ALBUTEROL SULFATE 3 ML: .5; 3 SOLUTION RESPIRATORY (INHALATION) at 10:45

## 2024-05-31 RX ADMIN — ISOSORBIDE MONONITRATE 120 MG: 30 TABLET, EXTENDED RELEASE ORAL at 09:12

## 2024-05-31 RX ADMIN — FLUTICASONE FUROATE AND VILANTEROL TRIFENATATE 1 PUFF: 100; 25 POWDER RESPIRATORY (INHALATION) at 10:46

## 2024-05-31 RX ADMIN — Medication 1 TABLET: at 06:49

## 2024-05-31 RX ADMIN — SACUBITRIL AND VALSARTAN 1 TABLET: 97; 103 TABLET, FILM COATED ORAL at 09:13

## 2024-05-31 ASSESSMENT — PAIN - FUNCTIONAL ASSESSMENT
PAIN_FUNCTIONAL_ASSESSMENT: 0-10

## 2024-05-31 ASSESSMENT — PAIN DESCRIPTION - DESCRIPTORS
DESCRIPTORS: ACHING

## 2024-05-31 ASSESSMENT — COGNITIVE AND FUNCTIONAL STATUS - GENERAL
MOVING FROM LYING ON BACK TO SITTING ON SIDE OF FLAT BED WITH BEDRAILS: A LITTLE
TURNING FROM BACK TO SIDE WHILE IN FLAT BAD: A LITTLE
STANDING UP FROM CHAIR USING ARMS: A LITTLE
MOBILITY SCORE: 17
WALKING IN HOSPITAL ROOM: A LITTLE
DRESSING REGULAR LOWER BODY CLOTHING: A LITTLE
TOILETING: A LITTLE
HELP NEEDED FOR BATHING: A LITTLE
CLIMB 3 TO 5 STEPS WITH RAILING: A LOT
DAILY ACTIVITIY SCORE: 21
MOVING TO AND FROM BED TO CHAIR: A LITTLE

## 2024-05-31 ASSESSMENT — PAIN SCALES - GENERAL
PAINLEVEL_OUTOF10: 7
PAINLEVEL_OUTOF10: 6
PAINLEVEL_OUTOF10: 7
PAINLEVEL_OUTOF10: 5 - MODERATE PAIN

## 2024-05-31 NOTE — PROGRESS NOTES
Stacey Heath is a 53 y.o. female on day 3 of admission presenting with Hypertensive emergency.    Transitional Care Coordination Progress Note:  Patient discussed during interdisciplinary rounds.   Team members present: MD and TCC  Plan per Medical/Surgical team: Patient Medically Ready.  Payor: [facility]  Discharge disposition: Home no needs.  Potential Barriers: None  ADOD: 05/31/24    Spoke with patient, she said she has a ride home and her PCP is   Mo Gutiérrez that she just saw and will make her own appointment for follow up with him she did not need me to do it for her.    KYLE SMITH RN

## 2024-05-31 NOTE — NURSING NOTE
5/31/2024: Discharge Note:     Discharge instructions were given to the patient virtual RN went over paperwork with patient.  Pt. IV access on Right Upper arm and Right External Jugular was removed and intact prior to discharge.   The patient left with all her belongings (one bag of miscellaneous items. IPhone and Ipad with ).   Meds to Bed was delivered to the bedside   Patient was transported down to the ED by  Transport and will be transporting her self home. (Patient drove to ED upon being admitted).     Ron Madrid LPN

## 2024-05-31 NOTE — SIGNIFICANT EVENT
Rapid Response RN Note     05/30/24 1938   Onset Documentation   Rapid Response Initiated By Radar auto page   Location/Room [facility] [place]   Pager Time 1928   Arrival Time 1938   Primary Reason for Call Radar auto page  (RADAR 8)       Rapid response RN at bedside for RADAR score 8 due to the following VS: T 35.0 °Celsius; HR 74; RR 18; BP 80/57; SPO2 93%.     Reviewed above VS with bedside RN.  Pt alert and oriented. C/o nausea, feeling warm, and abdominal pain. VS rechecked at 1945. T: 35.3, HR 81, RR, 18, BP: 130/65, 97% on 2L NC. Hospitalist Dr. Robb Stone contacted and Abd XR, CBC, and CDIFF test ordered. Staff to page rapid response for any concerns or acute change in condition/VS.

## 2024-05-31 NOTE — PROGRESS NOTES
"Stacey Heath is a 53 y.o. female on day 3 of admission presenting with Hypertensive emergency.    Subjective   Pt resting in bed. States c/o cramping to hands/legs has resolved, sob comes and goes, denies n/v/d, fever, cough, chills, pain, chest pains, constipation, light head, dizziness       Objective     Physical Exam  Vitals and nursing note reviewed.   Pulmonary:      Comments: Mckinley lung sounds diminished  O2 3Lnc  Abdominal:      General: There is distension.      Comments: Non-tender to palpation   Genitourinary:     Comments: States make little urine  Musculoskeletal:      Comments: Ble without edema     Skin:     General: Skin is warm and dry.   Neurological:      Mental Status: She is alert and oriented to person, place, and time.   Psychiatric:         Mood and Affect: Mood normal.         Behavior: Behavior normal.         Last Recorded Vitals  Blood pressure 128/67, pulse 73, temperature 36.1 °C (97 °F), temperature source Temporal, resp. rate 18, height 1.397 m (4' 7\"), weight 52.7 kg (116 lb 2.9 oz), SpO2 95%.  Intake/Output last 3 Shifts:  I/O last 3 completed shifts:  In: 1140 (21.6 mL/kg) [P.O.:240; I.V.:500 (9.5 mL/kg); Other:400]  Out: 1810 (34.3 mL/kg) [Other:1810]  Weight: 52.7 kg     Relevant Results  Scheduled medications  amLODIPine, 10 mg, oral, Daily  apixaban, 5 mg, oral, BID  aspirin, 81 mg, oral, Daily  atorvastatin, 80 mg, oral, Nightly  calcium acetate, 1,334 mg, oral, TID  carvedilol, 50 mg, oral, BID  cloNIDine, 0.2 mg, oral, q8h BELEN  fluticasone furoate-vilanteroL, 1 puff, inhalation, Daily  gabapentin, 300 mg, oral, Nightly  hydrALAZINE, 100 mg, oral, TID  ipratropium-albuteroL, 3 mL, nebulization, 4x daily  isosorbide mononitrate ER, 120 mg, oral, Daily  multivitamin with minerals, 1 tablet, oral, Daily  NIFEdipine ER, 90 mg, oral, Daily before breakfast  pantoprazole, 40 mg, oral, BID AC  sacubitriL-valsartan, 1 tablet, oral, BID  sucralfate, 1 g, oral, BID AC  SUMAtriptan, 25 " mg, oral, Once  torsemide, 20 mg, oral, Daily      Continuous medications     PRN medications  PRN medications: acetaminophen **OR** acetaminophen **OR** acetaminophen, hydrALAZINE, hydrOXYzine HCL, melatonin, metoclopramide, ondansetron ODT **OR** ondansetron, polyethylene glycol   Results for orders placed or performed during the hospital encounter of 05/28/24 (from the past 24 hour(s))   Electrolyte panel   Result Value Ref Range    Sodium 140 136 - 145 mmol/L    Potassium 3.3 (L) 3.5 - 5.3 mmol/L    Chloride 97 (L) 98 - 107 mmol/L    Bicarbonate 32 21 - 32 mmol/L    Anion Gap 14 10 - 20 mmol/L   Vitamin D 25-Hydroxy,Total (for eval of Vitamin D levels)   Result Value Ref Range    Vitamin D, 25-Hydroxy, Total 18 (L) 30 - 100 ng/mL   TSH   Result Value Ref Range    Thyroid Stimulating Hormone 1.83 0.44 - 3.98 mIU/L   PTH, Intact   Result Value Ref Range    Parathyroid Hormone, Intact 1,415.0 (H) 18.5 - 88.0 pg/mL   CBC   Result Value Ref Range    WBC 5.6 4.4 - 11.3 x10*3/uL    nRBC 0.0 0.0 - 0.0 /100 WBCs    RBC 4.71 4.00 - 5.20 x10*6/uL    Hemoglobin 12.9 12.0 - 16.0 g/dL    Hematocrit 40.6 36.0 - 46.0 %    MCV 86 80 - 100 fL    MCH 27.4 26.0 - 34.0 pg    MCHC 31.8 (L) 32.0 - 36.0 g/dL    RDW 16.0 (H) 11.5 - 14.5 %    Platelets 266 150 - 450 x10*3/uL   Comprehensive Metabolic Panel   Result Value Ref Range    Glucose 74 74 - 99 mg/dL    Sodium 108 (LL) 136 - 145 mmol/L    Potassium 3.6 3.5 - 5.3 mmol/L    Chloride 75 (L) 98 - 107 mmol/L    Bicarbonate 28 21 - 32 mmol/L    Anion Gap 9 (L) 10 - 20 mmol/L    Urea Nitrogen 44 (H) 6 - 23 mg/dL    Creatinine 8.30 (H) 0.50 - 1.05 mg/dL    eGFR 5 (L) >60 mL/min/1.73m*2    Calcium 9.7 8.6 - 10.6 mg/dL    Albumin 3.9 3.4 - 5.0 g/dL    Alkaline Phosphatase 94 33 - 110 U/L    Total Protein 7.5 6.4 - 8.2 g/dL    AST 22 9 - 39 U/L    Bilirubin, Total 0.3 0.0 - 1.2 mg/dL    ALT 13 7 - 45 U/L                    Assessment/Plan   Principal Problem:    Hypertensive  emergency  Active Problems:    Kidney transplant candidate    Did not Tolerate hemodialysis yesterday. Able to remove net fluid off 1.3L. Uf off for c/o light head    Bp stable with tx tx.  euvolemic on exam and has stable electrolytes K+3.6 , PTH elevated at 1415... awaiting repeat lab values     Outpatient Dialysis schedule:  TTS [facility]Dr Garcia      Access: rt arm fist- no issues - able to achieve      Anemia of ESRD:   not on SOFÍA ... Current hgb 12.9.. will cont to monitor... (Mircera 200mcg q2wks op)     CKD-MBD Phosphate Binder:multivitamin with minerals 1 tablet daily, calcium acetate (Phoslo) capsule 1,334 mg tid ac     Plan HD tomorrow with UF as tolerated     Renal diet      Please obtain daily standing wt (if possible)     Medication to be adjusted for ESRD      Patient to continue regular HD schedule while inpatient and to follow with the outpatient nephrologist at discharge          LILLY Rebolalr-CNP

## 2024-05-31 NOTE — DISCHARGE SUMMARY
Discharge Diagnosis  Hypertensive emergency    Issues Requiring Follow-Up  Encourage HD full session 3 times /week  Close f/p with her nephrologist     Test Results Pending At Discharge  Pending Labs       Order Current Status    Renal function panel Collected (05/31/24 4918)            Hospital Course  General Appearance: Alert, Oriented X3, Cooperative, Not in Acute Distress  HEENT: no eye redness, not pale, no jaundice, Throat: not congested, no ulcers    Chest: Good AE bilateral, dar  crackles, ronchies, wheezes.   Heart: RHR, Normal heart sounds S1, S2, no murmur or gallop,   Abdomen: Soft, lax, no hepatosplenomegaly, intact hernia orifices, negative pereira sign, no tenderness,   Genitalia: no abnormal discharge, no ulcers, no swelling.  Lymphatics: no lymphadenopathy, supraclavicular, inguinal trochlear, or axilla.   Neurology: Intact cranial nerves 2 to 12, intact sensation, intact motor function (Power, tone and reflexes). Normal DTR reflexes.   Extremities: no signs of PAD, no swelling no ulcers   Back: no deformity, no SI tenderness, no ulcers.   Skin: no signs of dehydration, no Rash, Bruises, or purpura.        AS:     Ms. Stacey Heath is a 53 y.o. female with pmhx of ESRD on HD (TTS), HTN, T2DM, and COPD who was admitted for hypertensive emergency, signs of decompensated HF due to volume overloaded, didn't completed her last session of HD due to cramps.  Nephrology consulted. Will c/w BP control, and HD. Has some signs of volume overload, not significant, c/w Nausea due to uremia, Nephrology plan to HD on 5/30.   Clinically improving, BP more controlled, Less cramps on HD, will plan to DC tomorrow if no further HD by Nephro.  He rPTH very high, will do thyroid US, to be followed with endocrine and Nephrology, encourage compliance with HD.      #Hypertensive emergency: improving   #HD due to Hypervolemia  -Markedly hypertensive on arrival > 200  -Given home medications,    -Will order hydralazine 10mg  "Q6HP SBP > 170 or DBP > 100  -Resume home medications  -Telemetry     #Acute on chronic hypoxic respiratory failure likely due to volume overload   -Imaging with evidence of fluid overload  - CT: small loculated left pleural effusion and associated left lower lobe subsegmental consolidative opacity favoring to represent atelectasis. There is a new subsegmental consolidation in the right middle lobe. This may also represent atelectasis, however developing pneumonia is not excluded.   -Reported to be on 4L NC pre-HD, on arrival 6L  -Continuous SPO2 monitoring  -c/w home meds, entresto, meto, isosorbid, GDMT as tolerated   - breathing rx  - kathie Spirometry   - ADD aBX pending inflammatory markers, and clinical course      #ESRD on HD  -Underwent HD in ED  -Consult nephrology in AM for continued HD management  -Renal diet  -Renal dosing where indicated     #T2DM  -Per EMR  -Last a1c 3.7 on 3/2024  -No SSI  #. Hyperparathyroid  - per nephrology this is tertiery no need for thyroid us, will f/p as outp possible celcacet.     #. Migraine headache, with N/v.  - recurrent, chronic  - she was taking at home \" Tylenol, Tramadol PRN\" also Sumatriptan 50 mg   - recommended to stop sumatriptan as this will make her BP uncontrolled, discussed with the patient, to be f/p with her nephrology and PCP team \" on discharge will give her 25 mg sumatriptan as needed not more than 1 tab every 48 hs for total of 5 doses/10days.     Code Status: Full  DVT ppx:  on apixaban   Disp: PT/ot      Spoke to my patient, Discussed the medical, social conditions, the reason for this admission explained our plan and recommendations.  Time spent on the assessment of patient, gathering and interpreting data, review of medical record/patient history, personally reviewing radiographic imaging. With greater than 50% spent in personal discussion with patient.  Time > 35  min       Pertinent Physical Exam At Time of Discharge  Physical Exam    Home " Medications     Medication List      START taking these medications     acetaminophen 325 mg tablet; Commonly known as: Tylenol; Take 2 tablets   (650 mg) by mouth every 4 hours if needed for mild pain (1 - 3) or   moderate pain (4 - 6).   calcium acetate 667 mg capsule; Commonly known as: Phoslo; Take 2   capsules (1,334 mg) by mouth 3 times daily (morning, midday, late   afternoon).   melatonin 5 mg tablet; Take 1 tablet (5 mg) by mouth as needed at   bedtime for sleep.   ondansetron ODT 4 mg disintegrating tablet; Commonly known as:   Zofran-ODT; Take 1 tablet (4 mg) by mouth every 12 hours if needed for   nausea or vomiting.   polyethylene glycol 17 gram packet; Commonly known as: Glycolax,   Miralax; Take 17 g by mouth once daily as needed (constipation).   SUMAtriptan 25 mg tablet; Commonly known as: Imitrex; Take 1 tablet (25   mg) by mouth every other day for 5 doses. May repeat dose once in 2 hours   if no relief.  Do not exceed 2 doses in 24 hours.; Replaces: SUMAtriptan   50 mg tablet     CHANGE how you take these medications     * amLODIPine 10 mg tablet; Commonly known as: Norvasc; TAKE 1 TABLET BY   MOUTH ONCE DAILY BEFORE DIALYSIS; What changed: Another medication with   the same name was added. Make sure you understand how and when to take   each.   * amLODIPine 10 mg tablet; Commonly known as: Norvasc; Take 1 tablet (10   mg) by mouth once daily.; Start taking on: June 1, 2024; What changed: You   were already taking a medication with the same name, and this prescription   was added. Make sure you understand how and when to take each.   apixaban 5 mg tablet; Commonly known as: Eliquis; Take 1 tablet (5 mg)   by mouth 2 times a day.; What changed: Another medication with the same   name was removed. Continue taking this medication, and follow the   directions you see here.   fluticasone furoate-vilanteroL 100-25 mcg/dose inhaler; Commonly known   as: Breo Ellipta; Inhale 1 puff once daily.; Start  taking on: June 1, 2024; What changed: how much to take  * This list has 2 medication(s) that are the same as other medications   prescribed for you. Read the directions carefully, and ask your doctor or   other care provider to review them with you.     CONTINUE taking these medications     * albuterol 1.25 mg/3 mL nebulizer solution; Take 3 mL (1.25 mg) by   nebulization every 6 hours if needed for wheezing.   * albuterol 90 mcg/actuation inhaler; Commonly known as: Ventolin HFA;   Inhale 2 puffs 2 times a day.   aspirin 81 mg EC tablet; Take 1 tablet (81 mg) by mouth once daily.   atorvastatin 80 mg tablet; Commonly known as: Lipitor; Take 1 tablet (80   mg) by mouth once daily at bedtime.   carvedilol 25 mg tablet; Commonly known as: Coreg; Take 2 tablets (50   mg) by mouth 2 times a day.   cloNIDine 0.2 mg tablet; Commonly known as: Catapres; Take 1 tablet (0.2   mg) by mouth every 8 hours.   Entresto  mg tablet; Generic drug: sacubitriL-valsartan; Take 1   tablet by mouth 2 times a day.   epoetin joceline 10,000 unit/mL injection; Commonly known as:   Epogen,Procrit; Inject 1 mL (10,000 Units) under the skin 3 times a week.   fluticasone 50 mcg/actuation nasal spray; Commonly known as: Flonase;   Administer 2 sprays into each nostril once daily. Shake gently. Before   first use, prime pump. After use, clean tip and replace cap.   fluticasone propion-salmeteroL 100-50 mcg/dose diskus inhaler; Commonly   known as: Advair Diskus; Inhale 1 puff once daily.   gabapentin 300 mg capsule; Commonly known as: Neurontin; Take 1 capsule   (300 mg) by mouth once daily at bedtime.   hydrALAZINE 100 mg tablet; Commonly known as: Apresoline; Take 1 tablet   (100 mg) by mouth 3 times a day for 23 days.   hydrOXYzine HCL 10 mg tablet; Commonly known as: Atarax; Take 1 tablet   (10 mg) by mouth every 6 hours if needed for itching.   isosorbide mononitrate  mg 24 hr tablet; Commonly known as: Imdur;   Take 1 tablet (120  mg) by mouth once daily. Do not crush or chew.   lidocaine 5 % patch; Commonly known as: Lidoderm   NIFEdipine ER 90 mg 24 hr tablet; Commonly known as: Adalat CC; Take 1   tablet (90 mg) by mouth once daily in the morning. Take before meals. Do   not crush, chew, or split.   ondansetron 4 mg tablet; Commonly known as: Zofran; Take 1 tablet (4 mg)   by mouth every 8 hours if needed for nausea or vomiting.   pantoprazole 40 mg EC tablet; Commonly known as: ProtoNix; Take 1 tablet   (40 mg) by mouth once daily. Do not crush, chew, or split.   torsemide 20 mg tablet; Commonly known as: Demadex; take 1 tablet by   mouth as directed. Take once daily on dialysis days, twice daily on   nondialysis days.  * This list has 2 medication(s) that are the same as other medications   prescribed for you. Read the directions carefully, and ask your doctor or   other care provider to review them with you.     STOP taking these medications     SUMAtriptan 50 mg tablet; Commonly known as: Imitrex; Replaced by:   SUMAtriptan 25 mg tablet     ASK your doctor about these medications     Deep Sea Nasal 0.65 % nasal spray; Generic drug: sodium chloride;   Administer 1 spray into each nostril 4 times a day as needed for   congestion.   Ensure liquid; Generic drug: nutritional drink; Take 118 mL by mouth 2   times a day.   metoclopramide 5 mg tablet; Commonly known as: Reglan; TAKE 1 TABLET BY   MOUTH EVERY 6 HOURS AS NEEDED       Outpatient Follow-Up  Future Appointments   Date Time Provider Department Center   6/3/2024  3:15 PM [facility] [place] 1 [facility]   6/4/2024  2:00 PM [facility] MAMMO 3 [facility]AM [facility]   6/17/2024  9:00 AM [facility] BOL6F PFT RM 2 [id number] [facility]   6/17/2024 10:00 AM [facility] [id number] [facility] RM [id number] [facility]   6/17/2024 10:30 AM [facility] [id number] W[facility] RM [id number] [facility]   6/17/2024 11:15 AM KEVIN Curtis [id number] [facility]   6/17/2024  3:30 PM [facility] 1 CMCCAICCT [facility]   7/15/2024  10:45 AM Antonietta Manzo DPM [id number]   7/16/2024  3:20 PM Mago Mota, APRN-CNP [id number] [facility]   7/22/2024  2:00 PM Kaitlin Tafoya MD [id number] Academic   7/23/2024  1:40 PM Kacey Garzon MD [id number] [facility]   8/14/2024  2:40 PM Marbella Rosales APRN-CNP [id number] Academic   8/28/2024 11:20 AM Judy Alcaraz MD [id number] Academic       Hai Yi MD

## 2024-05-31 NOTE — NURSING NOTE
5/31/24 1350 discharge  AVS reviewed with pt. All questions answered at this time. Pt has physical copy of AVS, clothes, and cell phone to take home. Pt awaiting meds to beds delivery and IV removal by bedside LPN. Pt will be placed in transport to be wheeled down to ride when ready.

## 2024-05-31 NOTE — PROGRESS NOTES
Stacey Heath is a 53 y.o. female on day 3 of admission presenting with Hypertensive emergency.      Dietary Orders (From admission, onward)               Adult diet Regular, Potassium restricted 2 gm (50mEq)  Diet effective now        Question Answer Comment   Diet type Regular    Diet type Potassium restricted 2 gm (50mEq)            Oral nutritional supplements  Until discontinued        Comments: Chocolate or strawberry   Question Answer Comment   Deliver with Breakfast    Deliver with Dinner    Select supplement: Ensure Plus                          Objective     Vitals  Temp:  [35 °C (95 °F)-36.5 °C (97.7 °F)] 36.1 °C (97 °F)  Heart Rate:  [74-84] 74  Resp:  [18] 18  BP: ()/(56-67) 157/64  FiO2 (%):  [21 %] 21 %       Pain Score: 8        Peripheral IV 05/28/24 20 G Right;Anterior External Jugular (Active)   Number of days: 1       Vent Settings  FiO2 (%):  [21 %] 21 %    Intake/Output Summary (Last 24 hours) at 5/31/2024 0818  Last data filed at 5/30/2024 1300  Gross per 24 hour   Intake 740 ml   Output 1810 ml   Net -1070 ml       Relevant Results       Scheduled medications  amLODIPine, 10 mg, oral, Daily  apixaban, 5 mg, oral, BID  aspirin, 81 mg, oral, Daily  atorvastatin, 80 mg, oral, Nightly  carvedilol, 50 mg, oral, BID  cloNIDine, 0.2 mg, oral, q8h BELEN  fluticasone furoate-vilanteroL, 1 puff, inhalation, Daily  gabapentin, 300 mg, oral, Nightly  hydrALAZINE, 100 mg, oral, TID  ipratropium-albuteroL, 3 mL, nebulization, 4x daily  isosorbide mononitrate ER, 120 mg, oral, Daily  levoFLOXacin, 500 mg, oral, q48h  multivitamin with minerals, 1 tablet, oral, Daily  NIFEdipine ER, 90 mg, oral, Daily before breakfast  pantoprazole, 40 mg, oral, Daily  sacubitriL-valsartan, 1 tablet, oral, BID  sucralfate, 1 g, oral, BID AC  torsemide, 20 mg, oral, Daily      Continuous medications     PRN medications  PRN medications: acetaminophen **OR** acetaminophen **OR** acetaminophen, hydrALAZINE, hydrOXYzine  HCL, melatonin, metoclopramide, ondansetron ODT **OR** ondansetron, polyethylene glycol                Assessment/Plan                    S/  See and examined  No new complaints no new event over the night   ROS: Negative    O/   General Appearance: Alert, Oriented X3, Cooperative, Not in Acute Distress  HEENT: no eye redness, not pale, no jaundice, Throat: not congested, no ulcers    Chest: Good AE bilateral, dar  crackles, ronchies, wheezes.   Heart: RHR, Normal heart sounds S1, S2, no murmur or gallop,   Abdomen: Soft, lax, no hepatosplenomegaly, intact hernia orifices, negative pereira sign, no tenderness,   Genitalia: no abnormal discharge, no ulcers, no swelling.  Lymphatics: no lymphadenopathy, supraclavicular, inguinal trochlear, or axilla.   Neurology: Intact cranial nerves 2 to 12, intact sensation, intact motor function (Power, tone and reflexes). Normal DTR reflexes.   Extremities: no signs of PAD, no swelling no ulcers   Back: no deformity, no SI tenderness, no ulcers.   Skin: no signs of dehydration, no Rash, Bruises, or purpura.      AS:    Ms. Stacey Heath is a 53 y.o. female with pmhx of ESRD on HD (TTS), HTN, T2DM, and COPD who was admitted for hypertensive emergency, signs of decompensated HF due to volume overloaded, didn't completed her last session of HD due to cramps.  Nephrology consulted. Will c/w BP control, and HD. Has some signs of volume overload, not significant, c/w Nausea due to uremia, Nephrology plan to HD on 5/30.   Clinically improving, BP more controlled, Less cramps on HD, will plan to DC tomorrow if no further HD by Nephro.  He rPTH very high, will do thyroid US, to be followed with endocrine and Nephrology, encourage compliance with HD.      #Hypertensive emergency: improving   #HD due to Hypervolemia  -Markedly hypertensive on arrival > 200  -Given home medications,    -Will order hydralazine 10mg Q6HP SBP > 170 or DBP > 100  -Resume home medications  -Telemetry    #Acute on  chronic hypoxic respiratory failure likely due to volume overload   -Imaging with evidence of fluid overload  - CT: small loculated left pleural effusion and associated left lower lobe subsegmental consolidative opacity favoring to represent atelectasis. There is a new subsegmental consolidation in the right middle lobe. This may also represent atelectasis, however developing pneumonia is not excluded.   -Reported to be on 4L NC pre-HD, on arrival 6L  -Continuous SPO2 monitoring  -c/w home meds, entresto, meto, isosorbid, GDMT as tolerated   - breathing rx  - kathie Spirometry   - ADD aBX pending inflammatory markers, and clinical course      #ESRD on HD  -Underwent HD in ED  -Consult nephrology in AM for continued HD management  -Renal diet  -Renal dosing where indicated     #T2DM  -Per EMR  -Last a1c 3.7 on 3/2024  -No SSI  #. Hyperparathyroid  - per nephrology this is tertiery no need for thyroid us, will f/p as outp possible celcacet.   Code Status: Full  DVT ppx:  on apixaban   Disp: PT/ot     Spoke to my patient, Discussed the medical, social conditions, the reason for this admission explained our plan and recommendations.  Time spent on the assessment of patient, gathering and interpreting data, review of medical record/patient history, personally reviewing radiographic imaging. With greater than 50% spent in personal discussion with patient.  Time > 35  min        Hai Yi MD

## 2024-05-31 NOTE — PROGRESS NOTES
Stacey Heath  Age: 53 y.o.  MRN: [id number]  Date: 5/30/2024  Location of service: in community    Program Details  Medicaid Community Clinical Case Management  Status: Enrolled  Effective Dates: 10/17/2023 - present  Responsible Staff: NAREN Davidson      Goals Reviewed:  Problem: Risk of Uncoordinated Care       Goal: Care will be Coordinated and Supported by a Multidisciplinary Team of Providers       Priority: High          Summary:  This writer sees patient in the hospital. Patient states she's admitted because of having a cold and high blood pressure.   OT arrives while this writer is present.  Patient is upset about missing her last appointment with Dr. Paredeskary  Patient spoke with the SW about switching her dialysis center.   This writer assists patient with scheduling mammogram, pap, sleep medicine, and gastro appt.   This writer write down patient's upcoming appointments for June.    Appointment start time: 1415  Appointment completion time: 1530  Total time spent with patient (in minutes): 75      Roberta Gallego RN

## 2024-06-03 ENCOUNTER — HOSPITAL ENCOUNTER (OUTPATIENT)
Dept: RADIOLOGY | Facility: HOSPITAL | Age: 54
Discharge: HOME | End: 2024-06-03
Payer: COMMERCIAL

## 2024-06-03 DIAGNOSIS — R06.02 SOB (SHORTNESS OF BREATH): ICD-10-CM

## 2024-06-03 PROCEDURE — 71250 CT THORAX DX C-: CPT

## 2024-06-03 PROCEDURE — 71250 CT THORAX DX C-: CPT | Performed by: RADIOLOGY

## 2024-06-04 ENCOUNTER — HOSPITAL ENCOUNTER (OUTPATIENT)
Dept: RADIOLOGY | Facility: HOSPITAL | Age: 54
Discharge: HOME | End: 2024-06-04
Payer: COMMERCIAL

## 2024-06-04 VITALS — BODY MASS INDEX: 27.77 KG/M2 | WEIGHT: 120 LBS | HEIGHT: 55 IN

## 2024-06-04 DIAGNOSIS — Z12.31 SCREENING MAMMOGRAM FOR BREAST CANCER: ICD-10-CM

## 2024-06-04 PROCEDURE — 77067 SCR MAMMO BI INCL CAD: CPT

## 2024-06-04 PROCEDURE — 77063 BREAST TOMOSYNTHESIS BI: CPT | Performed by: RADIOLOGY

## 2024-06-04 PROCEDURE — 77067 SCR MAMMO BI INCL CAD: CPT | Performed by: RADIOLOGY

## 2024-06-05 ENCOUNTER — DOCUMENTATION (OUTPATIENT)
Dept: BEHAVIORAL HEALTH | Facility: CLINIC | Age: 54
End: 2024-06-05
Payer: COMMERCIAL

## 2024-06-05 DIAGNOSIS — F41.9 ANXIETY: ICD-10-CM

## 2024-06-05 NOTE — PROGRESS NOTES
Stacey Heath  Age: 53 y.o.  MRN: [id number]  Date: 6/5/2024  Location of service: in community    Program Details  Medicaid Community Clinical Case Management  Status: Enrolled  Effective Dates: 10/17/2023 - present  Responsible Staff: NAREN Davidson      Goals Reviewed:  Problem: Anxiety       Goal: Attempts to manage anxiety with help       Priority: High         Goal: Verbalizes ways to manage anxiety       Priority: High         Goal: Implements measures to reduce anxiety       Priority: High        Problem: Financial Stressors       Goal: Assistance with financial concerns         Problem: Risk of Uncoordinated Care       Goal: Care will be Coordinated and Supported by a Multidisciplinary Team of Providers       Priority: High          Summary:  This provider met with patient at the patient's home. Provider reviewed upcoming appointments with the patient. This provider listened with empathy as the patient explained some of her thoughts and feelings lately about her health condition that is causing anxiety and guilt. Patient identified her values, health goals and barriers with the provider. This provider sent a message to Dr. Heredia regarding the patient needing to reschedule the appointment she missed with him while she was inpatient at [facility] last week.     Appointment start time: 1235  Appointment completion time: 1336  Total time spent with patient (in minutes): 61      NAREN Davidson

## 2024-06-07 ENCOUNTER — DOCUMENTATION (OUTPATIENT)
Dept: BEHAVIORAL HEALTH | Facility: CLINIC | Age: 54
End: 2024-06-07
Payer: COMMERCIAL

## 2024-06-12 ENCOUNTER — DOCUMENTATION (OUTPATIENT)
Dept: BEHAVIORAL HEALTH | Facility: CLINIC | Age: 54
End: 2024-06-12
Payer: COMMERCIAL

## 2024-06-12 DIAGNOSIS — F41.9 ANXIETY: ICD-10-CM

## 2024-06-13 NOTE — PROGRESS NOTES
Stacey Heath  Age: 54 y.o.  MRN: [id number]  Date: 6/7/2024  Location of service: in community    Program Details  Medicaid Community Clinical Case Management  Status: Enrolled  Effective Dates: 10/17/2023 - present  Responsible Staff: NAREN Davidson      Goals Reviewed:  Problem: Risk of Uncoordinated Care       Goal: Care will be Coordinated and Supported by a Multidisciplinary Team of Providers       Priority: High          Summary:  This writer attempts to call and schedule patient's colonoscopy. They state there was never an order placed for the colonoscopy.  This writer messages patient's PCP again in regards to the colonoscopy and who he would like the patient to be seen by after he leaves. This writer lost this information as [facility] deleted my messages.   Patient's fiancée, nephew, and grandson are all at her house during our appointment.     Appointment start time: 1129  Appointment completion time: 1205  Total time spent with patient (in minutes): 36      Roberta Gallego RN

## 2024-06-13 NOTE — PROGRESS NOTES
Stacey Heath  Age: 54 y.o.  MRN: [id number]  Date: 6/12/2024  Location of service: in community    Program Details  Medicaid Community Clinical Case Management  Status: Enrolled  Effective Dates: 10/17/2023 - present  Responsible Staff: NAREN Davidson      Goals Reviewed:  Problem: Anxiety       Goal: Attempts to manage anxiety with help       Priority: High         Goal: Verbalizes ways to manage anxiety       Priority: High         Goal: Implements measures to reduce anxiety       Priority: High        Summary:  This provider met with patient at the patient's home. This provider listened with empathy and engaged in conversation as the patient disclosed some family history and stressors she is currently experiencing due to some family history. Provider and patient came up with a few ways to cope with feelings of anxiety.     Appointment start time: 1007  Appointment completion time: 1137  Total time spent with patient (in minutes): 90      NAREN Davidson

## 2024-06-13 NOTE — PROGRESS NOTES
Stacey Heath  Age: 54 y.o.  MRN: [id number]  Date: 6/7/2024  Location of service: phone call    Program Details  Medicaid Community Clinical Case Management  Status: Enrolled  Effective Dates: 10/17/2023 - present  Responsible Staff: NAREN Davidson      Goals Reviewed:      Summary:  This writer calls patient to confirm our appointment for today. Patient confirms our appointment at this time.    Appointment start time: 1030  Appointment completion time: 1033  Total time spent with patient (in minutes): 3      Roberta Gallego RN

## 2024-06-19 ENCOUNTER — DOCUMENTATION (OUTPATIENT)
Dept: BEHAVIORAL HEALTH | Facility: CLINIC | Age: 54
End: 2024-06-19
Payer: COMMERCIAL

## 2024-06-20 ENCOUNTER — DOCUMENTATION (OUTPATIENT)
Dept: BEHAVIORAL HEALTH | Facility: CLINIC | Age: 54
End: 2024-06-20
Payer: COMMERCIAL

## 2024-06-21 NOTE — PROGRESS NOTES
Stacey Heath  Age: 54 y.o.  MRN: [id number]  Date: 6/19/2024  Location of service: in community    Program Details  Medicaid Community Clinical Case Management  Status: Enrolled  Effective Dates: 10/17/2023 - present  Responsible Staff: NAREN Davidson      Goals Reviewed:  Problem: Anxiety       Goal: Attempts to manage anxiety with help       Priority: High         Goal: Verbalizes ways to manage anxiety       Priority: High         Goal: Implements measures to reduce anxiety       Priority: High        Problem: Financial Stressors       Goal: Assistance with financial concerns             Summary:  This provider met with patient while at [facility]. Provider listed with empathy as patient explained how she ended up at [facility]. Provider gave patient information about potential financial help with the electricity bill during this summer heat. Provider agreed to help assist in setting up the medical appointments that patient missed due to being in patient. This provider made contact with Dave Case Manager for Care Coordination of medical appointments including the Cardiologist. This provider was in the room when the patient's care team came into the room to discharge from the hospital.     Appointment start time: 1145  Appointment completion time: 1225  Total time spent with patient (in minutes): 40      April NAREN Chavez

## 2024-06-26 ENCOUNTER — DOCUMENTATION (OUTPATIENT)
Dept: BEHAVIORAL HEALTH | Facility: CLINIC | Age: 54
End: 2024-06-26
Payer: COMMERCIAL

## 2024-06-26 DIAGNOSIS — F41.9 ANXIETY: ICD-10-CM

## 2024-06-26 NOTE — PROGRESS NOTES
Stacey Heath  Age: 54 y.o.  MRN: [id number]  Date: 6/26/2024  Location of service: in community    Program Details  Medicaid Community Clinical Case Management  Status: Enrolled  Effective Dates: 10/17/2023 - present  Responsible Staff: NAREN Davidson      Goals Reviewed:  Problem: Anxiety       Goal: Attempts to manage anxiety with help       Priority: High         Goal: Verbalizes ways to manage anxiety       Priority: High         Goal: Implements measures to reduce anxiety       Priority: High          Problem: Risk of Uncoordinated Care       Goal: Care will be Coordinated and Supported by a Multidisciplinary Team of Providers       Priority: High          Summary:  This provider met with patient in the patient's home. Provider listened with empathy as patient explained not feeling well after taking a new medication. Patient also reported that she has not been educated on when to have family members tested to see if they would be a match for a kidney transplant.   Provider assisted patient in rescheduling medical appointments that were missed as a result of the patient being in the hospital last week. This provider also assisted patient with access to the patient's [employer] account.     Appointment start time: 1100  Appointment completion time: 1206  Total time spent with patient (in minutes): 66      NAREN Davidson

## 2024-06-26 NOTE — PROGRESS NOTES
Stacey Heath  Age: 54 y.o.  MRN: [id number]  Date: 6/19/2024  Location of service: phone call    Program Details  Medicaid Community Clinical Case Management  Status: Enrolled  Effective Dates: 10/17/2023 - present  Responsible Staff: NAREN Davidson      Goals Reviewed:      Summary:  This writer calls about our appointment today. [facility] Patient will be seen by Ludmila SNEED today. This writer will schedule a meeting at a different time with the patient.    Appointment start time: 1100  Appointment completion time: 1105  Total time spent with patient (in minutes): 5      Roberta Gallego RN

## 2024-06-26 NOTE — PROGRESS NOTES
Stacey Heath  Age: 54 y.o.  MRN: [id number]  Date: 6/26/2024  Location of service: phone call    Program Details  Medicaid Community Clinical Case Management  Status: Enrolled  Effective Dates: 10/17/2023 - present  Responsible Staff: NAREN Davidson      Goals Reviewed:  Problem: Risk of Uncoordinated Care       Goal: Care will be Coordinated and Supported by a Multidisciplinary Team of Providers       Priority: High          Summary:  This writer meets with patient over the phone for an appointment.   Patient states she has all of her upcoming appointments set up. Patient is hoping to get her colonoscopy scheduled though.  This writer messages patient's upcoming PCP via [employer] in regards to placing a colonoscopy referral.   Patient states she feels like she has fluid weight on her, she said she hopes they will take off enough fluid tomorrow so that she doesn't have to set up an extra appointment.   Patient discusses her recent stay at the hospital.  Patient discusses her family.   [facility].       Appointment start time: 1300  Appointment completion time: 1336  Total time spent with patient (in minutes): 36      Roberta Gallego RN

## 2024-06-26 NOTE — PROGRESS NOTES
Stacey Heath  Age: 54 y.o.  MRN: [id number]  Date: 6/20/2024  Location of service: phone call    Program Details  Medicaid Community Clinical Case Management  Status: Enrolled  Effective Dates: 10/17/2023 - present  Responsible Staff: NAREN Davidson      Goals Reviewed:      Summary:  This writer calls patient to get her scheduled for a virtual appointment with this writer for next week. Patient is agreeable to a virtual appointment.    Appointment start time: 1545  Appointment completion time: 1549  Total time spent with patient (in minutes): 4      Roberta Gallego RN

## 2024-06-28 ENCOUNTER — CLINICAL SUPPORT (OUTPATIENT)
Dept: EMERGENCY MEDICINE | Facility: HOSPITAL | Age: 54
DRG: 304 | End: 2024-06-28
Payer: COMMERCIAL

## 2024-06-28 ENCOUNTER — HOSPITAL ENCOUNTER (INPATIENT)
Facility: HOSPITAL | Age: 54
End: 2024-06-28
Attending: EMERGENCY MEDICINE | Admitting: STUDENT IN AN ORGANIZED HEALTH CARE EDUCATION/TRAINING PROGRAM
Payer: COMMERCIAL

## 2024-06-28 ENCOUNTER — APPOINTMENT (OUTPATIENT)
Dept: RADIOLOGY | Facility: HOSPITAL | Age: 54
DRG: 304 | End: 2024-06-28
Payer: COMMERCIAL

## 2024-06-28 DIAGNOSIS — J81.0 FLASH PULMONARY EDEMA (MULTI): Primary | ICD-10-CM

## 2024-06-28 DIAGNOSIS — I50.32 CHRONIC HEART FAILURE WITH PRESERVED EJECTION FRACTION (HFPEF) (MULTI): ICD-10-CM

## 2024-06-28 LAB
ALBUMIN SERPL BCP-MCNC: 4.1 G/DL (ref 3.4–5)
ALP SERPL-CCNC: 127 U/L (ref 33–110)
ALT SERPL W P-5'-P-CCNC: 13 U/L (ref 7–45)
ANION GAP SERPL CALC-SCNC: 19 MMOL/L (ref 10–20)
AST SERPL W P-5'-P-CCNC: 22 U/L (ref 9–39)
ATRIAL RATE: 92 BPM
BASOPHILS # BLD AUTO: 0.1 X10*3/UL (ref 0–0.1)
BASOPHILS NFR BLD AUTO: 1 %
BILIRUB SERPL-MCNC: 0.6 MG/DL (ref 0–1.2)
BNP SERPL-MCNC: >5000 PG/ML (ref 0–99)
BUN SERPL-MCNC: 35 MG/DL (ref 6–23)
CALCIUM SERPL-MCNC: 9.2 MG/DL (ref 8.6–10.6)
CARDIAC TROPONIN I PNL SERPL HS: 28 NG/L (ref 0–34)
CHLORIDE SERPL-SCNC: 101 MMOL/L (ref 98–107)
CO2 SERPL-SCNC: 30 MMOL/L (ref 21–32)
CREAT SERPL-MCNC: 7.96 MG/DL (ref 0.5–1.05)
EGFRCR SERPLBLD CKD-EPI 2021: 6 ML/MIN/1.73M*2
EOSINOPHIL # BLD AUTO: 0.54 X10*3/UL (ref 0–0.7)
EOSINOPHIL NFR BLD AUTO: 5.1 %
ERYTHROCYTE [DISTWIDTH] IN BLOOD BY AUTOMATED COUNT: 16.7 % (ref 11.5–14.5)
GLUCOSE SERPL-MCNC: 121 MG/DL (ref 74–99)
HCT VFR BLD AUTO: 28.8 % (ref 36–46)
HGB BLD-MCNC: 9 G/DL (ref 12–16)
IMM GRANULOCYTES # BLD AUTO: 0.05 X10*3/UL (ref 0–0.7)
IMM GRANULOCYTES NFR BLD AUTO: 0.5 % (ref 0–0.9)
LYMPHOCYTES # BLD AUTO: 0.99 X10*3/UL (ref 1.2–4.8)
LYMPHOCYTES NFR BLD AUTO: 9.4 %
MAGNESIUM SERPL-MCNC: 2.31 MG/DL (ref 1.6–2.4)
MCH RBC QN AUTO: 27.4 PG (ref 26–34)
MCHC RBC AUTO-ENTMCNC: 31.3 G/DL (ref 32–36)
MCV RBC AUTO: 88 FL (ref 80–100)
MONOCYTES # BLD AUTO: 0.4 X10*3/UL (ref 0.1–1)
MONOCYTES NFR BLD AUTO: 3.8 %
NEUTROPHILS # BLD AUTO: 8.43 X10*3/UL (ref 1.2–7.7)
NEUTROPHILS NFR BLD AUTO: 80.2 %
NRBC BLD-RTO: 0 /100 WBCS (ref 0–0)
P AXIS: 39 DEGREES
P OFFSET: 199 MS
P ONSET: 148 MS
PLATELET # BLD AUTO: 247 X10*3/UL (ref 150–450)
POTASSIUM SERPL-SCNC: 4.6 MMOL/L (ref 3.5–5.3)
PR INTERVAL: 138 MS
PROT SERPL-MCNC: 7.7 G/DL (ref 6.4–8.2)
Q ONSET: 217 MS
QRS COUNT: 15 BEATS
QRS DURATION: 78 MS
QT INTERVAL: 372 MS
QTC CALCULATION(BAZETT): 460 MS
QTC FREDERICIA: 429 MS
R AXIS: 20 DEGREES
RBC # BLD AUTO: 3.29 X10*6/UL (ref 4–5.2)
SODIUM SERPL-SCNC: 145 MMOL/L (ref 136–145)
T AXIS: 175 DEGREES
T OFFSET: 403 MS
VENTRICULAR RATE: 92 BPM
WBC # BLD AUTO: 10.5 X10*3/UL (ref 4.4–11.3)

## 2024-06-28 PROCEDURE — 84484 ASSAY OF TROPONIN QUANT: CPT | Performed by: EMERGENCY MEDICINE

## 2024-06-28 PROCEDURE — 2500000004 HC RX 250 GENERAL PHARMACY W/ HCPCS (ALT 636 FOR OP/ED)

## 2024-06-28 PROCEDURE — 94640 AIRWAY INHALATION TREATMENT: CPT

## 2024-06-28 PROCEDURE — 99291 CRITICAL CARE FIRST HOUR: CPT | Performed by: EMERGENCY MEDICINE

## 2024-06-28 PROCEDURE — 83880 ASSAY OF NATRIURETIC PEPTIDE: CPT | Performed by: EMERGENCY MEDICINE

## 2024-06-28 PROCEDURE — 83735 ASSAY OF MAGNESIUM: CPT | Performed by: EMERGENCY MEDICINE

## 2024-06-28 PROCEDURE — 93005 ELECTROCARDIOGRAM TRACING: CPT

## 2024-06-28 PROCEDURE — 2500000004 HC RX 250 GENERAL PHARMACY W/ HCPCS (ALT 636 FOR OP/ED): Mod: JG

## 2024-06-28 PROCEDURE — 71045 X-RAY EXAM CHEST 1 VIEW: CPT

## 2024-06-28 PROCEDURE — 2020000001 HC ICU ROOM DAILY

## 2024-06-28 PROCEDURE — 96375 TX/PRO/DX INJ NEW DRUG ADDON: CPT

## 2024-06-28 PROCEDURE — 36415 COLL VENOUS BLD VENIPUNCTURE: CPT | Performed by: EMERGENCY MEDICINE

## 2024-06-28 PROCEDURE — 2500000001 HC RX 250 WO HCPCS SELF ADMINISTERED DRUGS (ALT 637 FOR MEDICARE OP)

## 2024-06-28 PROCEDURE — 93010 ELECTROCARDIOGRAM REPORT: CPT | Performed by: EMERGENCY MEDICINE

## 2024-06-28 PROCEDURE — 2500000005 HC RX 250 GENERAL PHARMACY W/O HCPCS: Performed by: EMERGENCY MEDICINE

## 2024-06-28 PROCEDURE — 2500000002 HC RX 250 W HCPCS SELF ADMINISTERED DRUGS (ALT 637 FOR MEDICARE OP, ALT 636 FOR OP/ED)

## 2024-06-28 PROCEDURE — 80053 COMPREHEN METABOLIC PANEL: CPT | Performed by: EMERGENCY MEDICINE

## 2024-06-28 PROCEDURE — 5A1D70Z PERFORMANCE OF URINARY FILTRATION, INTERMITTENT, LESS THAN 6 HOURS PER DAY: ICD-10-PCS | Performed by: STUDENT IN AN ORGANIZED HEALTH CARE EDUCATION/TRAINING PROGRAM

## 2024-06-28 PROCEDURE — 2500000004 HC RX 250 GENERAL PHARMACY W/ HCPCS (ALT 636 FOR OP/ED): Mod: JG | Performed by: EMERGENCY MEDICINE

## 2024-06-28 PROCEDURE — 94660 CPAP INITIATION&MGMT: CPT

## 2024-06-28 PROCEDURE — 71045 X-RAY EXAM CHEST 1 VIEW: CPT | Performed by: RADIOLOGY

## 2024-06-28 PROCEDURE — 85025 COMPLETE CBC W/AUTO DIFF WBC: CPT | Performed by: EMERGENCY MEDICINE

## 2024-06-28 PROCEDURE — 2500000001 HC RX 250 WO HCPCS SELF ADMINISTERED DRUGS (ALT 637 FOR MEDICARE OP): Performed by: EMERGENCY MEDICINE

## 2024-06-28 RX ORDER — IPRATROPIUM BROMIDE AND ALBUTEROL SULFATE 2.5; .5 MG/3ML; MG/3ML
9 SOLUTION RESPIRATORY (INHALATION) ONCE
Status: COMPLETED | OUTPATIENT
Start: 2024-06-28 | End: 2024-06-28

## 2024-06-28 RX ORDER — NITROGLYCERIN 0.4 MG/1
0.4 TABLET SUBLINGUAL ONCE
Status: COMPLETED | OUTPATIENT
Start: 2024-06-28 | End: 2024-06-28

## 2024-06-28 RX ORDER — NITROGLYCERIN 20 MG/100ML
INJECTION INTRAVENOUS
Status: COMPLETED
Start: 2024-06-28 | End: 2024-06-28

## 2024-06-28 RX ORDER — IPRATROPIUM BROMIDE AND ALBUTEROL SULFATE 2.5; .5 MG/3ML; MG/3ML
SOLUTION RESPIRATORY (INHALATION)
Status: COMPLETED
Start: 2024-06-28 | End: 2024-06-28

## 2024-06-28 RX ORDER — MAGNESIUM SULFATE HEPTAHYDRATE 40 MG/ML
INJECTION, SOLUTION INTRAVENOUS
Status: DISPENSED
Start: 2024-06-28 | End: 2024-06-29

## 2024-06-28 RX ORDER — NITROGLYCERIN 0.4 MG/1
TABLET SUBLINGUAL
Status: COMPLETED
Start: 2024-06-28 | End: 2024-06-28

## 2024-06-28 RX ORDER — HEPARIN SODIUM 5000 [USP'U]/ML
5000 INJECTION, SOLUTION INTRAVENOUS; SUBCUTANEOUS EVERY 8 HOURS SCHEDULED
Status: DISCONTINUED | OUTPATIENT
Start: 2024-06-28 | End: 2024-06-29

## 2024-06-28 RX ORDER — NITROGLYCERIN 20 MG/100ML
5-200 INJECTION INTRAVENOUS CONTINUOUS
Status: DISCONTINUED | OUTPATIENT
Start: 2024-06-28 | End: 2024-06-29

## 2024-06-28 RX ADMIN — NITROGLYCERIN 150 MCG/MIN: 20 INJECTION INTRAVENOUS at 21:06

## 2024-06-28 RX ADMIN — IPRATROPIUM BROMIDE AND ALBUTEROL SULFATE 9 ML: 2.5; .5 SOLUTION RESPIRATORY (INHALATION) at 20:19

## 2024-06-28 RX ADMIN — HEPARIN SODIUM 5000 UNITS: 5000 INJECTION INTRAVENOUS; SUBCUTANEOUS at 23:57

## 2024-06-28 RX ADMIN — METHYLPREDNISOLONE SODIUM SUCCINATE 125 MG: 125 INJECTION, POWDER, FOR SOLUTION INTRAMUSCULAR; INTRAVENOUS at 20:49

## 2024-06-28 RX ADMIN — NITROGLYCERIN 200 MCG/MIN: 20 INJECTION INTRAVENOUS at 20:40

## 2024-06-28 RX ADMIN — NITROGLYCERIN 0.4 MG: 0.4 TABLET SUBLINGUAL at 20:19

## 2024-06-28 RX ADMIN — IPRATROPIUM BROMIDE AND ALBUTEROL SULFATE 9 ML: .5; 3 SOLUTION RESPIRATORY (INHALATION) at 20:19

## 2024-06-28 RX ADMIN — NITROGLYCERIN 0.4 MG: 0.4 TABLET SUBLINGUAL at 20:20

## 2024-06-28 RX ADMIN — Medication 2 L/MIN: at 23:49

## 2024-06-28 SDOH — SOCIAL STABILITY: SOCIAL INSECURITY: DOES ANYONE TRY TO KEEP YOU FROM HAVING/CONTACTING OTHER FRIENDS OR DOING THINGS OUTSIDE YOUR HOME?: NO

## 2024-06-28 SDOH — SOCIAL STABILITY: SOCIAL INSECURITY: ABUSE: ADULT

## 2024-06-28 SDOH — SOCIAL STABILITY: SOCIAL INSECURITY: ARE THERE ANY APPARENT SIGNS OF INJURIES/BEHAVIORS THAT COULD BE RELATED TO ABUSE/NEGLECT?: NO

## 2024-06-28 SDOH — SOCIAL STABILITY: SOCIAL INSECURITY: HAVE YOU HAD ANY THOUGHTS OF HARMING ANYONE ELSE?: NO

## 2024-06-28 SDOH — SOCIAL STABILITY: SOCIAL INSECURITY: WERE YOU ABLE TO COMPLETE ALL THE BEHAVIORAL HEALTH SCREENINGS?: YES

## 2024-06-28 SDOH — SOCIAL STABILITY: SOCIAL INSECURITY: HAS ANYONE EVER THREATENED TO HURT YOUR FAMILY OR YOUR PETS?: NO

## 2024-06-28 SDOH — SOCIAL STABILITY: SOCIAL INSECURITY: DO YOU FEEL UNSAFE GOING BACK TO THE PLACE WHERE YOU ARE LIVING?: NO

## 2024-06-28 SDOH — SOCIAL STABILITY: SOCIAL INSECURITY: HAVE YOU HAD THOUGHTS OF HARMING ANYONE ELSE?: NO

## 2024-06-28 SDOH — SOCIAL STABILITY: SOCIAL INSECURITY: ARE YOU OR HAVE YOU BEEN THREATENED OR ABUSED PHYSICALLY, EMOTIONALLY, OR SEXUALLY BY ANYONE?: NO

## 2024-06-28 ASSESSMENT — COGNITIVE AND FUNCTIONAL STATUS - GENERAL
PATIENT BASELINE BEDBOUND: NO
DRESSING REGULAR LOWER BODY CLOTHING: A LITTLE
TOILETING: A LITTLE
MOBILITY SCORE: 20
CLIMB 3 TO 5 STEPS WITH RAILING: A LOT
WALKING IN HOSPITAL ROOM: A LOT
DAILY ACTIVITIY SCORE: 18
HELP NEEDED FOR BATHING: A LITTLE
EATING MEALS: A LITTLE
PERSONAL GROOMING: A LITTLE
DRESSING REGULAR UPPER BODY CLOTHING: A LITTLE

## 2024-06-28 ASSESSMENT — LIFESTYLE VARIABLES
HOW OFTEN DO YOU HAVE A DRINK CONTAINING ALCOHOL: NEVER
EVER FELT BAD OR GUILTY ABOUT YOUR DRINKING: NO
EVER HAD A DRINK FIRST THING IN THE MORNING TO STEADY YOUR NERVES TO GET RID OF A HANGOVER: NO
SKIP TO QUESTIONS 9-10: 1
TOTAL SCORE: 0
HOW MANY STANDARD DRINKS CONTAINING ALCOHOL DO YOU HAVE ON A TYPICAL DAY: PATIENT DOES NOT DRINK
AUDIT-C TOTAL SCORE: 0
HAVE YOU EVER FELT YOU SHOULD CUT DOWN ON YOUR DRINKING: NO
HOW OFTEN DO YOU HAVE 6 OR MORE DRINKS ON ONE OCCASION: NEVER
AUDIT-C TOTAL SCORE: 0
HAVE PEOPLE ANNOYED YOU BY CRITICIZING YOUR DRINKING: NO

## 2024-06-28 ASSESSMENT — ACTIVITIES OF DAILY LIVING (ADL)
BATHING: INDEPENDENT
LACK_OF_TRANSPORTATION: PATIENT DECLINED
PATIENT'S MEMORY ADEQUATE TO SAFELY COMPLETE DAILY ACTIVITIES?: YES
ADEQUATE_TO_COMPLETE_ADL: YES
GROOMING: INDEPENDENT
HEARING - RIGHT EAR: FUNCTIONAL
DRESSING YOURSELF: INDEPENDENT
TOILETING: INDEPENDENT
WALKS IN HOME: INDEPENDENT
HEARING - LEFT EAR: FUNCTIONAL
FEEDING YOURSELF: INDEPENDENT
JUDGMENT_ADEQUATE_SAFELY_COMPLETE_DAILY_ACTIVITIES: YES

## 2024-06-28 ASSESSMENT — PAIN - FUNCTIONAL ASSESSMENT
PAIN_FUNCTIONAL_ASSESSMENT: 0-10

## 2024-06-28 ASSESSMENT — PAIN DESCRIPTION - DESCRIPTORS: DESCRIPTORS: STABBING

## 2024-06-28 ASSESSMENT — PAIN SCALES - GENERAL
PAINLEVEL_OUTOF10: 6
PAINLEVEL_OUTOF10: 0 - NO PAIN
PAINLEVEL_OUTOF10: 10 - WORST POSSIBLE PAIN
PAINLEVEL_OUTOF10: 10 - WORST POSSIBLE PAIN

## 2024-06-29 ENCOUNTER — APPOINTMENT (OUTPATIENT)
Dept: DIALYSIS | Facility: HOSPITAL | Age: 54
End: 2024-06-29
Payer: COMMERCIAL

## 2024-06-29 ENCOUNTER — APPOINTMENT (OUTPATIENT)
Dept: CARDIOLOGY | Facility: HOSPITAL | Age: 54
DRG: 304 | End: 2024-06-29
Payer: COMMERCIAL

## 2024-06-29 LAB
ALBUMIN SERPL BCP-MCNC: 4.3 G/DL (ref 3.4–5)
ANION GAP SERPL CALC-SCNC: 16 MMOL/L (ref 10–20)
BASOPHILS # BLD MANUAL: 0 X10*3/UL (ref 0–0.1)
BASOPHILS NFR BLD MANUAL: 0 %
BUN SERPL-MCNC: 19 MG/DL (ref 6–23)
CALCIUM SERPL-MCNC: 9.6 MG/DL (ref 8.6–10.6)
CARDIAC TROPONIN I PNL SERPL HS: 23 NG/L (ref 0–34)
CHLORIDE SERPL-SCNC: 98 MMOL/L (ref 98–107)
CHOLEST SERPL-MCNC: 116 MG/DL (ref 0–199)
CHOLESTEROL/HDL RATIO: 1.7
CO2 SERPL-SCNC: 31 MMOL/L (ref 21–32)
CREAT SERPL-MCNC: 4.8 MG/DL (ref 0.5–1.05)
EGFRCR SERPLBLD CKD-EPI 2021: 10 ML/MIN/1.73M*2
EOSINOPHIL # BLD MANUAL: 0 X10*3/UL (ref 0–0.7)
EOSINOPHIL NFR BLD MANUAL: 0 %
ERYTHROCYTE [DISTWIDTH] IN BLOOD BY AUTOMATED COUNT: 16.9 % (ref 11.5–14.5)
EST. AVERAGE GLUCOSE BLD GHB EST-MCNC: 103 MG/DL
GLUCOSE SERPL-MCNC: 115 MG/DL (ref 74–99)
HBA1C MFR BLD: 5.2 %
HCT VFR BLD AUTO: 30 % (ref 36–46)
HDLC SERPL-MCNC: 68.2 MG/DL
HGB BLD-MCNC: 8.9 G/DL (ref 12–16)
IMM GRANULOCYTES # BLD AUTO: 0.04 X10*3/UL (ref 0–0.7)
IMM GRANULOCYTES NFR BLD AUTO: 0.5 % (ref 0–0.9)
LDLC SERPL CALC-MCNC: 37 MG/DL
LYMPHOCYTES # BLD MANUAL: 0.15 X10*3/UL (ref 1.2–4.8)
LYMPHOCYTES NFR BLD MANUAL: 1.7 %
MAGNESIUM SERPL-MCNC: 2.2 MG/DL (ref 1.6–2.4)
MCH RBC QN AUTO: 27 PG (ref 26–34)
MCHC RBC AUTO-ENTMCNC: 29.7 G/DL (ref 32–36)
MCV RBC AUTO: 91 FL (ref 80–100)
MONOCYTES # BLD MANUAL: 0.08 X10*3/UL (ref 0.1–1)
MONOCYTES NFR BLD MANUAL: 0.9 %
NEUTS SEG # BLD MANUAL: 8.38 X10*3/UL (ref 1.2–7)
NEUTS SEG NFR BLD MANUAL: 97.4 %
NON HDL CHOLESTEROL: 48 MG/DL (ref 0–149)
NRBC BLD-RTO: 0 /100 WBCS (ref 0–0)
PHOSPHATE SERPL-MCNC: 4.1 MG/DL (ref 2.5–4.9)
PLATELET # BLD AUTO: 232 X10*3/UL (ref 150–450)
POTASSIUM SERPL-SCNC: 4.4 MMOL/L (ref 3.5–5.3)
RBC # BLD AUTO: 3.3 X10*6/UL (ref 4–5.2)
RBC MORPH BLD: ABNORMAL
SODIUM SERPL-SCNC: 141 MMOL/L (ref 136–145)
TOTAL CELLS COUNTED BLD: 115
TRIGL SERPL-MCNC: 56 MG/DL (ref 0–149)
VLDL: 11 MG/DL (ref 0–40)
WBC # BLD AUTO: 8.6 X10*3/UL (ref 4.4–11.3)

## 2024-06-29 PROCEDURE — 2500000001 HC RX 250 WO HCPCS SELF ADMINISTERED DRUGS (ALT 637 FOR MEDICARE OP)

## 2024-06-29 PROCEDURE — 36415 COLL VENOUS BLD VENIPUNCTURE: CPT

## 2024-06-29 PROCEDURE — 2500000002 HC RX 250 W HCPCS SELF ADMINISTERED DRUGS (ALT 637 FOR MEDICARE OP, ALT 636 FOR OP/ED)

## 2024-06-29 PROCEDURE — 83036 HEMOGLOBIN GLYCOSYLATED A1C: CPT

## 2024-06-29 PROCEDURE — 83735 ASSAY OF MAGNESIUM: CPT

## 2024-06-29 PROCEDURE — 85027 COMPLETE CBC AUTOMATED: CPT

## 2024-06-29 PROCEDURE — 94640 AIRWAY INHALATION TREATMENT: CPT

## 2024-06-29 PROCEDURE — 2500000004 HC RX 250 GENERAL PHARMACY W/ HCPCS (ALT 636 FOR OP/ED)

## 2024-06-29 PROCEDURE — 83718 ASSAY OF LIPOPROTEIN: CPT

## 2024-06-29 PROCEDURE — 8010000001 HC DIALYSIS - HEMODIALYSIS PER DAY

## 2024-06-29 PROCEDURE — 84484 ASSAY OF TROPONIN QUANT: CPT

## 2024-06-29 PROCEDURE — 85007 BL SMEAR W/DIFF WBC COUNT: CPT

## 2024-06-29 PROCEDURE — 99292 CRITICAL CARE ADDL 30 MIN: CPT

## 2024-06-29 PROCEDURE — 1200000002 HC GENERAL ROOM WITH TELEMETRY DAILY

## 2024-06-29 PROCEDURE — 84100 ASSAY OF PHOSPHORUS: CPT

## 2024-06-29 PROCEDURE — 99291 CRITICAL CARE FIRST HOUR: CPT

## 2024-06-29 PROCEDURE — 93005 ELECTROCARDIOGRAM TRACING: CPT

## 2024-06-29 RX ORDER — ONDANSETRON HYDROCHLORIDE 2 MG/ML
INJECTION, SOLUTION INTRAVENOUS
Status: COMPLETED
Start: 2024-06-29 | End: 2024-06-29

## 2024-06-29 RX ORDER — HYDROMORPHONE HYDROCHLORIDE 1 MG/ML
0.4 INJECTION, SOLUTION INTRAMUSCULAR; INTRAVENOUS; SUBCUTANEOUS ONCE
Status: COMPLETED | OUTPATIENT
Start: 2024-06-29 | End: 2024-06-29

## 2024-06-29 RX ORDER — OXYCODONE HYDROCHLORIDE 5 MG/1
5 TABLET ORAL ONCE
Status: COMPLETED | OUTPATIENT
Start: 2024-06-29 | End: 2024-06-29

## 2024-06-29 RX ORDER — HYDROMORPHONE HYDROCHLORIDE 1 MG/ML
0.3 INJECTION, SOLUTION INTRAMUSCULAR; INTRAVENOUS; SUBCUTANEOUS ONCE
Status: COMPLETED | OUTPATIENT
Start: 2024-06-29 | End: 2024-06-29

## 2024-06-29 RX ORDER — ATORVASTATIN CALCIUM 80 MG/1
80 TABLET, FILM COATED ORAL NIGHTLY
Status: DISCONTINUED | OUTPATIENT
Start: 2024-06-29 | End: 2024-06-29

## 2024-06-29 RX ORDER — NITROGLYCERIN 20 MG/100ML
5-200 INJECTION INTRAVENOUS CONTINUOUS
Status: DISCONTINUED | OUTPATIENT
Start: 2024-06-29 | End: 2024-06-29

## 2024-06-29 RX ORDER — CLONIDINE HYDROCHLORIDE 0.1 MG/1
0.2 TABLET ORAL EVERY 8 HOURS SCHEDULED
Status: DISPENSED | OUTPATIENT
Start: 2024-06-29

## 2024-06-29 RX ORDER — HYDRALAZINE HYDROCHLORIDE 50 MG/1
50 TABLET, FILM COATED ORAL 3 TIMES DAILY
Status: DISCONTINUED | OUTPATIENT
Start: 2024-06-29 | End: 2024-06-29

## 2024-06-29 RX ORDER — CARVEDILOL 25 MG/1
50 TABLET ORAL 2 TIMES DAILY
Status: DISCONTINUED | OUTPATIENT
Start: 2024-06-29 | End: 2024-06-29

## 2024-06-29 RX ORDER — HYDRALAZINE HYDROCHLORIDE 100 MG/1
100 TABLET, FILM COATED ORAL 3 TIMES DAILY
Status: DISCONTINUED | OUTPATIENT
Start: 2024-06-29 | End: 2024-06-30

## 2024-06-29 RX ORDER — GABAPENTIN 100 MG/1
100 CAPSULE ORAL NIGHTLY
Status: DISPENSED | OUTPATIENT
Start: 2024-06-29

## 2024-06-29 RX ORDER — NICARDIPINE HYDROCHLORIDE 0.2 MG/ML
2.5-15 INJECTION INTRAVENOUS CONTINUOUS
Status: DISCONTINUED | OUTPATIENT
Start: 2024-06-29 | End: 2024-06-30

## 2024-06-29 RX ORDER — HYDRALAZINE HYDROCHLORIDE 50 MG/1
50 TABLET, FILM COATED ORAL ONCE
Status: COMPLETED | OUTPATIENT
Start: 2024-06-29 | End: 2024-06-29

## 2024-06-29 RX ORDER — ISOSORBIDE MONONITRATE 30 MG/1
120 TABLET, EXTENDED RELEASE ORAL DAILY
Status: DISCONTINUED | OUTPATIENT
Start: 2024-06-30 | End: 2024-06-30

## 2024-06-29 RX ORDER — AMLODIPINE BESYLATE 10 MG/1
10 TABLET ORAL DAILY
Status: DISCONTINUED | OUTPATIENT
Start: 2024-06-29 | End: 2024-06-29

## 2024-06-29 RX ORDER — ATORVASTATIN CALCIUM 40 MG/1
40 TABLET, FILM COATED ORAL NIGHTLY
Status: DISPENSED | OUTPATIENT
Start: 2024-06-29

## 2024-06-29 RX ORDER — ACETAMINOPHEN 500 MG
5 TABLET ORAL NIGHTLY PRN
Status: ACTIVE | OUTPATIENT
Start: 2024-06-29

## 2024-06-29 RX ORDER — CARVEDILOL 25 MG/1
25 TABLET ORAL 2 TIMES DAILY
Status: DISCONTINUED | OUTPATIENT
Start: 2024-06-29 | End: 2024-06-29

## 2024-06-29 RX ORDER — ALBUTEROL SULFATE 0.83 MG/ML
2.5 SOLUTION RESPIRATORY (INHALATION) EVERY 4 HOURS PRN
Status: DISPENSED | OUTPATIENT
Start: 2024-06-29

## 2024-06-29 RX ORDER — HYDROMORPHONE HYDROCHLORIDE 1 MG/ML
INJECTION, SOLUTION INTRAMUSCULAR; INTRAVENOUS; SUBCUTANEOUS
Status: COMPLETED
Start: 2024-06-29 | End: 2024-06-29

## 2024-06-29 RX ORDER — DIPHENHYDRAMINE HCL 25 MG
25 CAPSULE ORAL ONCE
Status: COMPLETED | OUTPATIENT
Start: 2024-06-29 | End: 2024-06-29

## 2024-06-29 RX ORDER — TORSEMIDE 20 MG/1
40 TABLET ORAL
Status: DISCONTINUED | OUTPATIENT
Start: 2024-06-29 | End: 2024-06-29

## 2024-06-29 RX ORDER — NICARDIPINE HYDROCHLORIDE 0.2 MG/ML
INJECTION INTRAVENOUS
Status: COMPLETED
Start: 2024-06-29 | End: 2024-06-29

## 2024-06-29 RX ORDER — CLONIDINE HYDROCHLORIDE 0.1 MG/1
0.2 TABLET ORAL EVERY 8 HOURS SCHEDULED
Status: DISCONTINUED | OUTPATIENT
Start: 2024-06-29 | End: 2024-06-29

## 2024-06-29 RX ORDER — ONDANSETRON HYDROCHLORIDE 2 MG/ML
4 INJECTION, SOLUTION INTRAVENOUS ONCE
Status: COMPLETED | OUTPATIENT
Start: 2024-06-29 | End: 2024-06-29

## 2024-06-29 RX ORDER — HYDRALAZINE HYDROCHLORIDE 100 MG/1
100 TABLET, FILM COATED ORAL 3 TIMES DAILY
Status: DISCONTINUED | OUTPATIENT
Start: 2024-06-29 | End: 2024-06-29

## 2024-06-29 RX ORDER — ASPIRIN 81 MG/1
81 TABLET ORAL DAILY
Status: DISPENSED | OUTPATIENT
Start: 2024-06-29

## 2024-06-29 RX ORDER — ISOSORBIDE MONONITRATE 30 MG/1
120 TABLET, EXTENDED RELEASE ORAL DAILY
Status: DISCONTINUED | OUTPATIENT
Start: 2024-06-29 | End: 2024-06-29

## 2024-06-29 RX ORDER — GABAPENTIN 300 MG/1
300 CAPSULE ORAL NIGHTLY
Status: DISCONTINUED | OUTPATIENT
Start: 2024-06-29 | End: 2024-06-29

## 2024-06-29 RX ORDER — TORSEMIDE 20 MG/1
40 TABLET ORAL
Status: DISPENSED | OUTPATIENT
Start: 2024-06-30

## 2024-06-29 RX ORDER — ACETAMINOPHEN 325 MG/1
975 TABLET ORAL 3 TIMES DAILY
Status: DISPENSED | OUTPATIENT
Start: 2024-06-29

## 2024-06-29 RX ORDER — ACETAMINOPHEN 325 MG/1
975 TABLET ORAL EVERY 6 HOURS PRN
Status: DISCONTINUED | OUTPATIENT
Start: 2024-06-29 | End: 2024-06-29

## 2024-06-29 RX ORDER — PANTOPRAZOLE SODIUM 40 MG/1
40 TABLET, DELAYED RELEASE ORAL DAILY
Status: DISPENSED | OUTPATIENT
Start: 2024-06-29

## 2024-06-29 RX ORDER — CARVEDILOL 25 MG/1
50 TABLET ORAL 2 TIMES DAILY
Status: DISPENSED | OUTPATIENT
Start: 2024-06-29

## 2024-06-29 RX ORDER — NIFEDIPINE 90 MG/1
90 TABLET, EXTENDED RELEASE ORAL
Status: DISCONTINUED | OUTPATIENT
Start: 2024-06-30 | End: 2024-06-29

## 2024-06-29 RX ORDER — NIFEDIPINE 90 MG/1
90 TABLET, EXTENDED RELEASE ORAL
Status: DISCONTINUED | OUTPATIENT
Start: 2024-06-29 | End: 2024-06-29

## 2024-06-29 RX ADMIN — HYDRALAZINE HYDROCHLORIDE 100 MG: 100 TABLET ORAL at 14:51

## 2024-06-29 RX ADMIN — HYDRALAZINE HYDROCHLORIDE 50 MG: 50 TABLET ORAL at 10:01

## 2024-06-29 RX ADMIN — HYDRALAZINE HYDROCHLORIDE 100 MG: 100 TABLET ORAL at 20:41

## 2024-06-29 RX ADMIN — AMLODIPINE BESYLATE 10 MG: 10 TABLET ORAL at 01:02

## 2024-06-29 RX ADMIN — APIXABAN 5 MG: 5 TABLET, FILM COATED ORAL at 20:42

## 2024-06-29 RX ADMIN — CARVEDILOL 25 MG: 25 TABLET, FILM COATED ORAL at 08:25

## 2024-06-29 RX ADMIN — ONDANSETRON HYDROCHLORIDE 4 MG: 2 INJECTION, SOLUTION INTRAVENOUS at 01:47

## 2024-06-29 RX ADMIN — HYDRALAZINE HYDROCHLORIDE 50 MG: 50 TABLET ORAL at 08:25

## 2024-06-29 RX ADMIN — ALBUTEROL SULFATE 2.5 MG: 2.5 SOLUTION RESPIRATORY (INHALATION) at 20:19

## 2024-06-29 RX ADMIN — OXYCODONE HYDROCHLORIDE 5 MG: 5 TABLET ORAL at 02:25

## 2024-06-29 RX ADMIN — ONDANSETRON HYDROCHLORIDE 4 MG: 2 INJECTION, SOLUTION INTRAVENOUS at 11:33

## 2024-06-29 RX ADMIN — ACETAMINOPHEN 975 MG: 325 TABLET ORAL at 01:02

## 2024-06-29 RX ADMIN — ONDANSETRON 4 MG: 2 INJECTION INTRAMUSCULAR; INTRAVENOUS at 01:47

## 2024-06-29 RX ADMIN — ASPIRIN 81 MG: 81 TABLET, COATED ORAL at 08:27

## 2024-06-29 RX ADMIN — CARVEDILOL 50 MG: 25 TABLET, FILM COATED ORAL at 20:41

## 2024-06-29 RX ADMIN — NITROGLYCERIN 90 MCG/MIN: 20 INJECTION INTRAVENOUS at 13:08

## 2024-06-29 RX ADMIN — CLONIDINE HYDROCHLORIDE 0.2 MG: 0.1 TABLET ORAL at 20:43

## 2024-06-29 RX ADMIN — ACETAMINOPHEN 975 MG: 325 TABLET ORAL at 14:51

## 2024-06-29 RX ADMIN — ATORVASTATIN CALCIUM 80 MG: 80 TABLET, FILM COATED ORAL at 01:02

## 2024-06-29 RX ADMIN — DIPHENHYDRAMINE HYDROCHLORIDE 25 MG: 25 CAPSULE ORAL at 16:07

## 2024-06-29 RX ADMIN — HYDROMORPHONE HYDROCHLORIDE 0.3 MG: 1 INJECTION, SOLUTION INTRAMUSCULAR; INTRAVENOUS; SUBCUTANEOUS at 11:46

## 2024-06-29 RX ADMIN — HYDROMORPHONE HYDROCHLORIDE 0.4 MG: 1 INJECTION, SOLUTION INTRAMUSCULAR; INTRAVENOUS; SUBCUTANEOUS at 14:05

## 2024-06-29 RX ADMIN — SACUBITRIL AND VALSARTAN 1 TABLET: 97; 103 TABLET, FILM COATED ORAL at 20:43

## 2024-06-29 RX ADMIN — HYDRALAZINE HYDROCHLORIDE 50 MG: 50 TABLET ORAL at 01:05

## 2024-06-29 RX ADMIN — CLONIDINE HYDROCHLORIDE 0.2 MG: 0.1 TABLET ORAL at 15:45

## 2024-06-29 RX ADMIN — PANTOPRAZOLE SODIUM 40 MG: 40 TABLET, DELAYED RELEASE ORAL at 08:25

## 2024-06-29 RX ADMIN — CARVEDILOL 50 MG: 25 TABLET, FILM COATED ORAL at 01:02

## 2024-06-29 RX ADMIN — ATORVASTATIN CALCIUM 40 MG: 40 TABLET, FILM COATED ORAL at 20:43

## 2024-06-29 RX ADMIN — ACETAMINOPHEN 975 MG: 325 TABLET ORAL at 20:43

## 2024-06-29 RX ADMIN — NICARDIPINE HYDROCHLORIDE 10 MG/HR: 0.2 INJECTION INTRAVENOUS at 18:22

## 2024-06-29 RX ADMIN — SACUBITRIL AND VALSARTAN 1 TABLET: 97; 103 TABLET, FILM COATED ORAL at 08:25

## 2024-06-29 RX ADMIN — APIXABAN 5 MG: 5 TABLET, FILM COATED ORAL at 08:25

## 2024-06-29 RX ADMIN — GABAPENTIN 100 MG: 100 CAPSULE ORAL at 20:41

## 2024-06-29 RX ADMIN — ONDANSETRON 4 MG: 2 INJECTION INTRAMUSCULAR; INTRAVENOUS at 14:04

## 2024-06-29 RX ADMIN — ONDANSETRON HYDROCHLORIDE 4 MG: 2 INJECTION, SOLUTION INTRAVENOUS at 14:04

## 2024-06-29 RX ADMIN — ONDANSETRON 4 MG: 2 INJECTION INTRAMUSCULAR; INTRAVENOUS at 11:33

## 2024-06-29 ASSESSMENT — PAIN SCALES - GENERAL
PAINLEVEL_OUTOF10: 5 - MODERATE PAIN
PAINLEVEL_OUTOF10: 7
PAINLEVEL_OUTOF10: 0 - NO PAIN
PAINLEVEL_OUTOF10: 7
PAINLEVEL_OUTOF10: 6

## 2024-06-29 ASSESSMENT — PAIN DESCRIPTION - DESCRIPTORS
DESCRIPTORS: HEADACHE
DESCRIPTORS: PRESSURE

## 2024-06-29 ASSESSMENT — PAIN - FUNCTIONAL ASSESSMENT
PAIN_FUNCTIONAL_ASSESSMENT: 0-10

## 2024-06-29 ASSESSMENT — PAIN DESCRIPTION - LOCATION: LOCATION: TOE (COMMENT WHICH ONE)

## 2024-06-29 ASSESSMENT — PAIN DESCRIPTION - ORIENTATION: ORIENTATION: LEFT

## 2024-06-29 NOTE — ED TRIAGE NOTES
Patient presented to the ED for shortness of breath through triage. Patient tripodding in triage, taken to room 3 immediately, Dialysis Tues Thursday Saturday, had full session Thursday 1 1/2 L removed.

## 2024-06-29 NOTE — CARE PLAN
Problem: Skin  Goal: Decreased wound size/increased tissue granulation at next dressing change  Outcome: Progressing  Flowsheets (Taken 6/29/2024 0139)  Decreased wound size/increased tissue granulation at next dressing change: Protective dressings over bony prominences  Goal: Participates in plan/prevention/treatment measures  Outcome: Progressing  Flowsheets (Taken 6/29/2024 0139)  Participates in plan/prevention/treatment measures:   Discuss with provider PT/OT consult   Elevate heels   Increase activity/out of bed for meals  Goal: Prevent/manage excess moisture  Outcome: Progressing  Flowsheets (Taken 6/29/2024 0139)  Prevent/manage excess moisture: Moisturize dry skin  Goal: Prevent/minimize sheer/friction injuries  Outcome: Progressing  Flowsheets (Taken 6/29/2024 0139)  Prevent/minimize sheer/friction injuries:   HOB 30 degrees or less   Increase activity/out of bed for meals   Use pull sheet  Goal: Promote/optimize nutrition  Outcome: Progressing  Flowsheets (Taken 6/29/2024 0139)  Promote/optimize nutrition: Discuss with provider if NPO > 2 days  Goal: Promote skin healing  Outcome: Progressing  Flowsheets (Taken 6/29/2024 0139)  Promote skin healing: Protective dressings over bony prominences     Problem: Pain - Adult  Goal: Verbalizes/displays adequate comfort level or baseline comfort level  Outcome: Progressing     Problem: Safety - Adult  Goal: Free from fall injury  Outcome: Progressing     Problem: Discharge Planning  Goal: Discharge to home or other facility with appropriate resources  Outcome: Progressing     Problem: Chronic Conditions and Co-morbidities  Goal: Patient's chronic conditions and co-morbidity symptoms are monitored and maintained or improved  Outcome: Progressing     Problem: Fall/Injury  Goal: Not fall by end of shift  Outcome: Progressing  Goal: Be free from injury by end of the shift  Outcome: Progressing  Goal: Verbalize understanding of personal risk factors for fall in the  hospital  Outcome: Progressing  Goal: Verbalize understanding of risk factor reduction measures to prevent injury from fall in the home  Outcome: Progressing  Goal: Use assistive devices by end of the shift  Outcome: Progressing  Goal: Pace activities to prevent fatigue by end of the shift  Outcome: Progressing       The clinical goals for the shift include pt will maintain -180 though shift

## 2024-06-29 NOTE — CONSULTS
"Stacey Heath   54 y.o.      Vitals:    06/28/24 2310   Weight: 57.9 kg (127 lb 10.3 oz)      MRN/Room: [id number]/23/23-A    HPI:  Stacey Heath is a 54 y.o. female with PMH ESRD on HD TTS, DM2, HTN, COPD who presented due to SOB, with elevated blood pressures. Started on a nitroglycerin drip and placed on BiPAP in the ED, and admitted to the MICU.     She was dialyzed for 2 hours overnight 06/28-06/29, and for a full session on 06/29.      BP (!) 194/82   Pulse 85   Temp 36.4 °C (97.5 °F) (Temporal)   Resp 19   Ht 1.397 m (4' 7\")   Wt 57.9 kg (127 lb 10.3 oz)   SpO2 96%   BMI 29.67 kg/m²      Past Medical History:   Diagnosis Date    Acute pancreatitis ([facility]) 01/20/2024    Acute pyelonephritis due to bacteria 01/16/2023    Acute renal failure (CMS-HCC) 02/25/2023    YARA (acute kidney injury) (CMS-HCC) 08/03/2021    Bilateral shoulder pain 02/25/2023    Breast pain 05/18/2021    History of breast pain    Cardiac/pericardial tamponade ([facility]) 01/20/2024    Chronic renal failure syndrome 01/10/2022    Community acquired pneumonia 11/03/2021    Dependence on renal dialysis (CMS-HCC) 11/21/2022    Hemodialysis patient    Disorder of sweat glands 02/25/2023    Dry eye syndrome of bilateral lacrimal glands 03/07/2017    Dry eyes    Essential (primary) hypertension 12/27/2022    Hypertension    Flash pulmonary edema (Multi) 01/20/2024    Comment on above: FLASH PULMONARY EDEMA J81.0    History of acute pancreatitis 12/21/2020    History of acute pancreatitis    Low grade squamous intraepithelial lesion (LGSIL) on cervicovaginal cytologic smear 02/25/2023    Migraine headache 02/25/2023    Comment on above: MIGRAINES    Migraines     History of migraine    Obstruction of urinary stent (CMS-HCC) 02/25/2023    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 Improved Afebrile No pain on EOM Can see clearly from left eye PLAN PO augmentin as outpatient    Pleural effusion 12/12/2023    Pneumonia 04/11/2023    Sepsis ([facility]) 01/20/2024    Status migrainosus 08/21/2012    Type 2 myocardial infarction ([facility]) 01/20/2024    Unspecified diastolic (congestive) heart failure ([facility]) 11/21/2022    Heart failure with preserved left ventricular function (HFpEF)    Vaginal dryness 07/29/2022    Vaginal dryness      Past Surgical History:   Procedure Laterality Date    APPENDECTOMY  03/07/2017    Appendectomy    CT ABDOMEN ANGIOGRAM W AND/OR WO IV CONTRAST  4/23/2023    CT ABDOMEN ANGIOGRAM W AND/OR WO IV CONTRAST [facility]    OTHER SURGICAL HISTORY  03/07/2017    Cystoscopy With Pyeloscopy With Removal Of Calculus    OTHER SURGICAL HISTORY  03/07/2017    Anoscopy For Polyp Removal    OTHER SURGICAL HISTORY  12/08/2021    Arteriovenous fistula creation procedure    OTHER SURGICAL HISTORY  12/08/2021    Dialysis tunneled catheter placement    TUBAL LIGATION  03/07/2017    Tubal Ligation      Family History   Problem Relation Name Age of Onset    Other (Cerebrovascular Accident) Father      Heart failure Paternal Grandmother      Breast cancer Paternal Grandmother      Other (Primary Cervical Cancer) Paternal Grandmother      Diabetes Paternal Grandfather      Breast cancer Father's Sister      Ovarian cancer Father's Sister       Social History     Socioeconomic History    Marital status: Single     Spouse name: Not on file    Number of children: Not on file    Years of education: Not on file    Highest education level: Not on file   Occupational History    Not on file   Tobacco Use    Smoking status: Former     Types: Cigarettes    Smokeless tobacco: Never   Vaping Use    Vaping status: Never Used   Substance and Sexual Activity    Alcohol use: Not Currently    Drug use: Never    Sexual  activity: Not on file   Other Topics Concern    Not on file   Social History Narrative    Not on file     Social Determinants of Health     Financial Resource Strain: Patient Declined (6/28/2024)    Overall Financial Resource Strain (CARDIA)     Difficulty of Paying Living Expenses: Patient declined   Recent Concern: Financial Resource Strain - High Risk (5/29/2024)    Overall Financial Resource Strain (CARDIA)     Difficulty of Paying Living Expenses: Very hard   Food Insecurity: No Food Insecurity (6/17/2024)    [facility]    Hunger Vital Sign     Worried About Running Out of Food in the Last Year: Never true     Ran Out of Food in the Last Year: Never true   Transportation Needs: Patient Declined (6/28/2024)    PRAPARE - Transportation     Lack of Transportation (Medical): Patient declined     Lack of Transportation (Non-Medical): Patient declined   Physical Activity: Insufficiently Active (10/17/2023)    Exercise Vital Sign     Days of Exercise per Week: 3 days     Minutes of Exercise per Session: 10 min   Stress: No Stress Concern Present (10/17/2023)    [employer] - Occupational Stress Questionnaire     Feeling of Stress : Only a little   Social Connections: Moderately Integrated (10/17/2023)    Social Connection and Isolation Panel [NHANES]     Frequency of Communication with Friends and Family: More than three times a week     Frequency of Social Gatherings with Friends and Family: Once a week     Attends Mandaen Services: More than 4 times per year     Active Member of Clubs or Organizations: No     Attends Club or Organization Meetings: Not on file     Marital Status: Living with partner   Intimate Partner Violence: Not At Risk (10/17/2023)    Humiliation, Afraid, Rape, and Kick questionnaire     Fear of Current or Ex-Partner: No     Emotionally Abused: No     Physically Abused: No     Sexually Abused: No   Housing Stability: Patient Declined (6/28/2024)     Housing Stability Vital Sign     Unable to Pay for Housing in the Last Year: Patient declined     Number of Places Lived in the Last Year: 1     Unstable Housing in the Last Year: Patient declined       Allergies   Allergen Reactions    Iodine Hives, Itching and Unknown    Bioflavonoids Swelling    Codeine Itching, Hives and Unknown     Tolerates percocet   Tolerates percocet    Tolerates percocet    Flowers Itching    Hydromorphone Itching    Shellfish Containing Products Swelling     SEAFOOD        Prior to Admission Medications   Prescriptions Last Dose Informant Patient Reported? Taking?   NIFEdipine ER (Adalat CC) 90 mg 24 hr tablet Unknown Self No No   Sig: Take 1 tablet (90 mg) by mouth once daily in the morning. Take before meals. Do not crush, chew, or split.   SUMAtriptan (Imitrex) 25 mg tablet Unknown  No No   Sig: Take 1 tablet (25 mg) by mouth every other day for 5 doses. May repeat dose once in 2 hours if no relief.  Do not exceed 2 doses in 24 hours.   acetaminophen (Tylenol) 325 mg tablet Unknown  No No   Sig: Take 2 tablets (650 mg) by mouth every 4 hours if needed for mild pain (1 - 3) or moderate pain (4 - 6).   albuterol (Ventolin HFA) 90 mcg/actuation inhaler Unknown Self No No   Sig: Inhale 2 puffs 2 times a day.   albuterol 1.25 mg/3 mL nebulizer solution Unknown Self No No   Sig: Take 3 mL (1.25 mg) by nebulization every 6 hours if needed for wheezing.   amLODIPine (Norvasc) 10 mg tablet  Self No No   Sig: TAKE 1 TABLET BY MOUTH ONCE DAILY BEFORE DIALYSIS   amLODIPine (Norvasc) 10 mg tablet   No No   Sig: Take 1 tablet (10 mg) by mouth once daily.   apixaban (Eliquis) 5 mg tablet Past Week Self No Yes   Sig: Take 1 tablet (5 mg) by mouth 2 times a day.   aspirin 81 mg EC tablet Past Week Self No Yes   Sig: Take 1 tablet (81 mg) by mouth once daily.   atorvastatin (Lipitor) 80 mg tablet Past Week Self No Yes   Sig: Take 1 tablet (80 mg) by mouth once daily at bedtime.   calcium acetate  (Phoslo) 667 mg capsule Past Week  No Yes   Sig: Take 2 capsules (1,334 mg) by mouth 3 times daily (morning, midday, late afternoon).   carvedilol (Coreg) 25 mg tablet Past Week Self No Yes   Sig: Take 2 tablets (50 mg) by mouth 2 times a day.   Patient taking differently: Take 1 tablet (25 mg) by mouth 2 times a day.   cloNIDine (Catapres) 0.2 mg tablet Unknown Self No No   Sig: Take 1 tablet (0.2 mg) by mouth every 8 hours.   epoetin joceline (Epogen,Procrit) 10,000 unit/mL injection Past Week Self No Yes   Sig: Inject 1 mL (10,000 Units) under the skin 3 times a week.   fluticasone (Flonase) 50 mcg/actuation nasal spray Unknown Self No No   Sig: Administer 2 sprays into each nostril once daily. Shake gently. Before first use, prime pump. After use, clean tip and replace cap.   Patient taking differently: Administer 2 sprays into each nostril once daily as needed. Shake gently. Before first use, prime pump. After use, clean tip and replace cap.   fluticasone furoate-vilanteroL (Breo Ellipta) 100-25 mcg/dose inhaler Past Week  No Yes   Sig: Inhale 1 puff once daily.   fluticasone propion-salmeteroL (Advair Diskus) 100-50 mcg/dose diskus inhaler Not Taking Self No No   Sig: Inhale 1 puff once daily.   Patient not taking: Reported on 6/29/2024   gabapentin (Neurontin) 300 mg capsule Past Week Self No Yes   Sig: Take 1 capsule (300 mg) by mouth once daily at bedtime.   hydrALAZINE (Apresoline) 100 mg tablet Past Week Self No Yes   Sig: Take 1 tablet (100 mg) by mouth 3 times a day for 23 days.   Patient taking differently: Take 0.5 tablets (50 mg) by mouth 3 times a day.   hydrOXYzine HCL (Atarax) 10 mg tablet Unknown Self No No   Sig: Take 1 tablet (10 mg) by mouth every 6 hours if needed for itching.   isosorbide mononitrate ER (Imdur) 120 mg 24 hr tablet  Self No No   Sig: Take 1 tablet (120 mg) by mouth once daily. Do not crush or chew.   lidocaine (Lidoderm) 5 % patch Unknown Self Yes No   Sig: Place 1 patch on the  skin once daily as needed. Remove & discard patch within 12 hours or as directed by MD.   melatonin 5 mg tablet Unknown  No No   Sig: Take 1 tablet (5 mg) by mouth as needed at bedtime for sleep.   metoclopramide (Reglan) 5 mg tablet Unknown Self No No   Sig: TAKE 1 TABLET BY MOUTH EVERY 6 HOURS AS NEEDED   Patient not taking: Reported on 5/29/2024   nutritional drink (Ensure) liquid  Self No No   Sig: Take 118 mL by mouth 2 times a day.   Patient not taking: Reported on 5/29/2024   ondansetron (Zofran) 4 mg tablet Unknown Self No No   Sig: Take 1 tablet (4 mg) by mouth every 8 hours if needed for nausea or vomiting.   ondansetron ODT (Zofran-ODT) 4 mg disintegrating tablet Unknown  No No   Sig: Take 1 tablet (4 mg) by mouth every 12 hours if needed for nausea or vomiting.   pantoprazole (ProtoNix) 40 mg EC tablet Past Week Self No Yes   Sig: Take 1 tablet (40 mg) by mouth once daily. Do not crush, chew, or split.   polyethylene glycol (Glycolax, Miralax) 17 gram/dose powder Unknown  No No   Sig: Take 17 g by mouth once daily as needed (constipation).   sacubitriL-valsartan (Entresto)  mg tablet Past Week Self No Yes   Sig: Take 1 tablet by mouth 2 times a day.   sodium chloride (Ocean) 0.65 % nasal spray Unknown Self No No   Sig: Administer 1 spray into each nostril 4 times a day as needed for congestion.   Patient not taking: Reported on 5/29/2024   torsemide (Demadex) 20 mg tablet Past Week Self No Yes   Sig: take 1 tablet by mouth as directed. Take once daily on dialysis days, twice daily on nondialysis days.      Facility-Administered Medications: None        Meds:   acetaminophen, 975 mg, TID  apixaban, 5 mg, BID  aspirin, 81 mg, Daily  atorvastatin, 40 mg, Nightly  carvedilol, 50 mg, BID  cloNIDine, 0.2 mg, q8h BELEN  gabapentin, 100 mg, Nightly  hydrALAZINE, 100 mg, TID  [START ON 6/30/2024] NIFEdipine ER, 90 mg, Daily before breakfast  pantoprazole, 40 mg, Daily  sacubitriL-valsartan, 1 tablet,  BID  [START ON 6/30/2024] torsemide, 40 mg, Once per day on Sunday Monday Wednesday Friday      nitroglycerin, Last Rate: 90 mcg/min (06/29/24 1458)      albuterol, 2.5 mg, q4h PRN  melatonin, 5 mg, Nightly PRN        Vitals:    06/29/24 1600   BP: (!) 194/82   Pulse: 85   Resp: 19   Temp: 36.4 °C (97.5 °F)   SpO2: 96%        06/27 1900 - 06/29 0659  In: 1711.6 [I.V.:1311.6]  Out: 1900        General appearance: AAOx3. No distress  Eyes: non-icteric  Skin: no apparent rash  Heart: S1 S2 regular.  Lungs: CTA bilat.  No wheezing/crackles  Abdomen: soft, nt/nd  Extremities: No edema bilat  Neuro: No FND  ACCESS: RUE AVF    Blood Labs:  Results for orders placed or performed during the hospital encounter of 06/28/24 (from the past 24 hour(s))   ECG 12 lead   Result Value Ref Range    Ventricular Rate 92 BPM    Atrial Rate 92 BPM    KS Interval 138 ms    QRS Duration 78 ms    QT Interval 372 ms    QTC Calculation(Bazett) 460 ms    P Axis 39 degrees    R Axis 20 degrees    T Axis 175 degrees    QRS Count 15 beats    Q Onset 217 ms    P Onset 148 ms    P Offset 199 ms    T Offset 403 ms    QTC Fredericia 429 ms   CBC and Auto Differential   Result Value Ref Range    WBC 10.5 4.4 - 11.3 x10*3/uL    nRBC 0.0 0.0 - 0.0 /100 WBCs    RBC 3.29 (L) 4.00 - 5.20 x10*6/uL    Hemoglobin 9.0 (L) 12.0 - 16.0 g/dL    Hematocrit 28.8 (L) 36.0 - 46.0 %    MCV 88 80 - 100 fL    MCH 27.4 26.0 - 34.0 pg    MCHC 31.3 (L) 32.0 - 36.0 g/dL    RDW 16.7 (H) 11.5 - 14.5 %    Platelets 247 150 - 450 x10*3/uL    Neutrophils % 80.2 40.0 - 80.0 %    Immature Granulocytes %, Automated 0.5 0.0 - 0.9 %    Lymphocytes % 9.4 13.0 - 44.0 %    Monocytes % 3.8 2.0 - 10.0 %    Eosinophils % 5.1 0.0 - 6.0 %    Basophils % 1.0 0.0 - 2.0 %    Neutrophils Absolute 8.43 (H) 1.20 - 7.70 x10*3/uL    Immature Granulocytes Absolute, Automated 0.05 0.00 - 0.70 x10*3/uL    Lymphocytes Absolute 0.99 (L) 1.20 - 4.80 x10*3/uL    Monocytes Absolute 0.40 0.10 - 1.00 x10*3/uL     Eosinophils Absolute 0.54 0.00 - 0.70 x10*3/uL    Basophils Absolute 0.10 0.00 - 0.10 x10*3/uL   Comprehensive metabolic panel   Result Value Ref Range    Glucose 121 (H) 74 - 99 mg/dL    Sodium 145 136 - 145 mmol/L    Potassium 4.6 3.5 - 5.3 mmol/L    Chloride 101 98 - 107 mmol/L    Bicarbonate 30 21 - 32 mmol/L    Anion Gap 19 10 - 20 mmol/L    Urea Nitrogen 35 (H) 6 - 23 mg/dL    Creatinine 7.96 (H) 0.50 - 1.05 mg/dL    eGFR 6 (L) >60 mL/min/1.73m*2    Calcium 9.2 8.6 - 10.6 mg/dL    Albumin 4.1 3.4 - 5.0 g/dL    Alkaline Phosphatase 127 (H) 33 - 110 U/L    Total Protein 7.7 6.4 - 8.2 g/dL    AST 22 9 - 39 U/L    Bilirubin, Total 0.6 0.0 - 1.2 mg/dL    ALT 13 7 - 45 U/L   Troponin I, High Sensitivity   Result Value Ref Range    Troponin I, High Sensitivity ([facility]) 28 0 - 34 ng/L   Magnesium   Result Value Ref Range    Magnesium 2.31 1.60 - 2.40 mg/dL   B-type natriuretic peptide   Result Value Ref Range    BNP >5,000 (H) 0 - 99 pg/mL   CBC and Auto Differential   Result Value Ref Range    WBC 8.6 4.4 - 11.3 x10*3/uL    nRBC 0.0 0.0 - 0.0 /100 WBCs    RBC 3.30 (L) 4.00 - 5.20 x10*6/uL    Hemoglobin 8.9 (L) 12.0 - 16.0 g/dL    Hematocrit 30.0 (L) 36.0 - 46.0 %    MCV 91 80 - 100 fL    MCH 27.0 26.0 - 34.0 pg    MCHC 29.7 (L) 32.0 - 36.0 g/dL    RDW 16.9 (H) 11.5 - 14.5 %    Platelets 232 150 - 450 x10*3/uL    Immature Granulocytes %, Automated 0.5 0.0 - 0.9 %    Immature Granulocytes Absolute, Automated 0.04 0.00 - 0.70 x10*3/uL   Renal Function Panel   Result Value Ref Range    Glucose 115 (H) 74 - 99 mg/dL    Sodium 141 136 - 145 mmol/L    Potassium 4.4 3.5 - 5.3 mmol/L    Chloride 98 98 - 107 mmol/L    Bicarbonate 31 21 - 32 mmol/L    Anion Gap 16 10 - 20 mmol/L    Urea Nitrogen 19 6 - 23 mg/dL    Creatinine 4.80 (H) 0.50 - 1.05 mg/dL    eGFR 10 (L) >60 mL/min/1.73m*2    Calcium 9.6 8.6 - 10.6 mg/dL    Phosphorus 4.1 2.5 - 4.9 mg/dL    Albumin 4.3 3.4 - 5.0 g/dL   Magnesium   Result Value Ref Range    Magnesium  2.20 1.60 - 2.40 mg/dL   Manual Differential   Result Value Ref Range    Neutrophils %, Manual 97.4 40.0 - 80.0 %    Lymphocytes %, Manual 1.7 13.0 - 44.0 %    Monocytes %, Manual 0.9 2.0 - 10.0 %    Eosinophils %, Manual 0.0 0.0 - 6.0 %    Basophils %, Manual 0.0 0.0 - 2.0 %    Seg Neutrophils Absolute, Manual 8.38 (H) 1.20 - 7.00 x10*3/uL    Lymphocytes Absolute, Manual 0.15 (L) 1.20 - 4.80 x10*3/uL    Monocytes Absolute, Manual 0.08 (L) 0.10 - 1.00 x10*3/uL    Eosinophils Absolute, Manual 0.00 0.00 - 0.70 x10*3/uL    Basophils Absolute, Manual 0.00 0.00 - 0.10 x10*3/uL    Total Cells Counted 115     RBC Morphology No significant RBC morphology present    Hemoglobin A1c   Result Value Ref Range    Hemoglobin A1C 5.2 see below %    Estimated Average Glucose 103 Not Established mg/dL   Lipid panel   Result Value Ref Range    Cholesterol 116 0 - 199 mg/dL    HDL-Cholesterol 68.2 mg/dL    Cholesterol/HDL Ratio 1.7     LDL Calculated 37 <=99 mg/dL    VLDL 11 0 - 40 mg/dL    Triglycerides 56 0 - 149 mg/dL    Non HDL Cholesterol 48 0 - 149 mg/dL   Troponin I, High Sensitivity   Result Value Ref Range    Troponin I, High Sensitivity ([facility]) 23 0 - 34 ng/L        ASSESSMENT:  #ESRD on HD TTS   #Hypertensive emergency  #Flash pulmonary edema vs COPD exacerbation      [facility]   [facility]: [facility] nephrologist: Dr Garcia  EDW: Uncertain, ?55kg from outpatient records     RECOMMENDATIONS:  -iHD today as per submitted orders   -Will follow     DW Dr Madi Floyd MD  Nephrology Fellow   Daytime / Weekend Renal Pager [phone]  After 7 pm Emergencies [phone] Pager [phone]

## 2024-06-29 NOTE — H&P
HPI  Stacey Heath is a 53 y.o. female with PMH ESRD 2/2 HTN and diabetes on dialysis T/Th/Sat via RUE AVF (last complete session 6/27/24) and poorly controlled hypertension, DMT2, COPD, brachial DVTon Eliquis (4/14/2024) admitted to MICU for hypertensive emergency.     Patient reports she was sitting on the cough watching TV when she started feeling short of breath and had a headache. Reports that this has happened before and she was recently admitted for similar reason. Asked her  to bring her to the hospital for this shortness of breath. Reports compliance with dialysis, with last session yesterday (Thursday).     In the ED, patient's BP was 247/109, , RR 26, O2 75% on RA. Patient was started on BiPAP and Nitrohlycerin drip after 2 sublingual nitroglycerin tablets. Received Methylprednisolone 125 mg, and DuoNebs.     Upon admission to MICU, patient was on BiPAP asking to take it off, /73 on Nitroglycerine drip 115. Reports improvement in her dyspnea and headache. Denies chest pain, fevers, chills, sputum production.     Patient's history of medical illness and HTN is complicated by multiple prescriptions of blood pressure medications prescriptions. Her fill history shows recent fills for Entresto  BID and Amlodpine 10mg daily, but her medication history showed nifedipine 90 daily, carvedilol 25mg BID, clonidine 0.2mg q8, hydralazine 100mg TID, Imdur 120mg daily. Patient has had multiple admissions for hypertesnsive emergency, last one was 6/14/2024 at [facility]. Was started on Torsemide 40 on non-dialysis days, stopped clonidine and nifedipine.       Dialysis history   Hemodialysis: Current dialysis Tuesday Thursday Saturday.  Dry weight 52 kg.  Via right upper extremity AV fistula.   ROS: oliguric, no urinary retention/hematuria/recurrent UTIs.  Kidney stone requiring cystoscopy removal  Disease Etiology:  diabetic nephropathy and hypertensive nephropathy.   Disease Complications:   "congestive heart failure, dyslipidemia and hypertension.     ED Vitals:  Temp: 36.1 °C (97 °F)  Heart Rate: (!) 104  Resp: (!) 26  BP: (!) 247/109  MAP (mmHg): 152    Current Vitals:  BP (!) 187/73   Pulse 87   Temp 36.3 °C (97.3 °F) (Temporal)   Resp 17   Ht 1.397 m (4' 7\")   Wt 57.9 kg (127 lb 10.3 oz)   SpO2 97%   BMI 29.67 kg/m²  - Labs:   CBC: WBC 10.5, Hgb 9.0, plt 247   BMP: Na 145, K 4.5, Cl 101, HCO3 30, BUN 35, Cr 7.96, glu 121   LFT: Ca 9.2, tprot 7.7, alb 4.1, alkphos 127, AST 22, ALT 13, tbili 0.6   Electrolytes: Mg 2.3   hs-TnI 28, BNP >5,000   BCx x2 drawn  - Imaging:  CXR:  IMPRESSION:  1. Cardiomegaly. Mild vascular congestion and interstitial edema.  Moderate left pleural effusion. Bibasilar airspace opacities, may be  secondary to atelectasis or pneumonia.          - EKG:  Normal sinus rhythm, T-wave inversions in V5-6 that were present on prior EKGs.     - Echocardiography:  TTE 4/3/2024:  CONCLUSIONS:   1. Left ventricular systolic function is normal with a 60-65% estimated ejection fraction.   2. Moderately increased left ventricular septal thickness.   3. Spectral Doppler shows a pseudonormal pattern of left ventricular diastolic filling.   4. The left ventricular posterior wall thickness is moderately increased.   5. There is severe concentric left ventricular hypertrophy.   6. The left atrium is mild to moderately dilated.   7. There is moderate mitral annular calcification.   8. Moderate tricuspid regurgitation visualized.   9. Moderately elevated right ventricular systolic pressure.  10. Compared with the prior exam from 6/19/2023, there are no significant changes except the RVSP has increased from 33mmHg to 52.3mmHg.  11. Strain values are abnormal, which imply subclinical myocardial dysfunction.    Stress test 12/2023:  CONCLUSIONS:    1. SPECT Perfusion Study: Normal.    2. There is no scintigraphic evidence for inducible ischemia.    3. No evidence of scarred myocardium.    4. " Left ventricle is normal in size. The left ventricle systolic   function is normal.    5. Right ventricle is normal in size. The right ventricle systolic   function is normal.    6. This is a low risk scan.       - [facility] 3/2022:  CONCLUSIONS:   1. Mild non-obstructive coronary artery disease.   2. Mild pulmonary hypertension with elevated left sided filling pressures (PCWP of 30mmHg)      preserved to high CI/CO      non-obstructive mild coronary      R dominant system.    - EGD:  Impression:              - 2 cm hiatal hernia.  - Mild gastritis. Biopsied.  - Normal examined duodenum.      Home Medications   Amlodipine 10 mg  Aspirin 81   Atorvastatin  Carvedilol 50 mg BID  Clonidine 0.2 TID vs. recently stopped?  Gabapentin 300 mg nightly  Hydralazine 100 mg TID vs. 50?  IMDUR 120 mg vs. recently stopped?  Melatonin 5 mg  Nifedipine 90 mg daily  Pantoprazole 40 mg  Entresto  BID    Constitutional: in NAD, on BiPAP  HEENT: sclerae anicteric, EOM grossly intact  CV: RRR, systolic murmur noted  Pulm: diminished breaths bilaterally  GI: abd soft, NT, ND  Skin: warm and dry  Ext: bilateral LE without pitting edema   Neuro: alert and conversant  Psych: affect appropriate    Assessment & Plan  Stacey Heath is a 53 y.o. female with PMH ESRD 2/2 HTN and diabetes on dialysis T/Th/Sat via RUE AVF (last complete session 6/27/24) and poorly controlled hypertension, DMT2, COPD, brachial DVTon Eliquis (4/14/2024) admitted to MICU for hypertensive emergency and pulmonary edema. On BiPAP and nitroglycerine infusion. Will receive emergent dialysis overnight and reintroduce home BP regimen as tolerated.    NEUROLOGIC  #Headache  -secondary to hypertension vs nitroglycerin gtt  -wean nitroglycerin gtt as tolerated  -tylenol 975mg tid scheduled    CARDIOVASCULAR  #Hypertensive emergency  #Pulmonary edema  -Presented with /109  -goal reduce SBP by 25% in first 24hrs, goal is SBP ~190  -nitroglycerin gtt, wean as  tolerated  -continue home regimen of amlodipine 10mg daily, coreg 25mg bid, clonidine 0.2mg TID, hydralazine 100mg tid, imdur ER 30mg daily, entresto  BID  -Needs medication reconciliation and optimization of BP regimen to limit readmissions to the hospital          #HFpEF  ::Echo 6/2023 with EF 55-60%  ::Limited with GDMT given ESRD   -Toresmide on non-dialysis days    #Hx of Brachial DVT  -Continue Eliquis 5 mg BID    PULMONARY  #AHRF 2/2 volume overload/moderate pulmonary edema  -plan for dialysis overnight  -nephrology consulted  -goal O2 sat >94%, titrate oxygen as needed     #COPD?   -following with pulm fellow clinic at , no PFTs yet to confirm COPD diagnosis  -planned for outpatient CT chest, PFTs, and sleep study  -continue home albuterol prn and breo ellipta    RENAL/  #ESRD on HD TTS   #Hypertensive emergency  #Flash pulmonary edema   :: Access: RUE AVF   ::HD unit: [facility]  ::Primary nephrologist: Dr Garcia  ::EDW: Uncertain, ?55kg from outpatient record  -Emergent dialysis in MICU overnight  -Nephrology consulted and aware  -Electrolyte check post dialysis       GASTROINTESTINAL  ZAIN     ENDOCRINE  #T2DM c/b retinopathy, nephropathy and neuropathy  -no medications    HEME/ONC  #Anemia of chronic disease I/s/o ESRD  -on Epo outpatient   -daily cbc    INFECTIOUS DISEASE  ZAIN    MSK/DERM  ::Recently broke her left toe   -Pain regimen   -PT/OT      N: NPO while on BiPAP  A: PIV    DVT ppx: Eliquis  GI ppx: Pantoprazole    Code Status: Full Code (confirmed on Admission)  Surrogate Medical Decision-maker: Hai Shahida (spouse) [phone]        Edvin Jean-Baptiste, PGY-2

## 2024-06-29 NOTE — PROGRESS NOTES
MICU Progress Note  Stacey Heath is a 54 y.o. female on day 1 of admission presenting with Flash pulmonary edema (Multi).    Subjective   Patient resting comfortably in bed this morning. She says her headaches have resolved though she does still have some chest tightness. Otherwise her breathing is much improved. Denies CP, SOB, NV, abdominal pain, dizziness.    INTERVAL EVENTS:  -S/p dialysis overnight with 1.5L UF  -S/p amlodipine 10mg, hydralazine 50mg, coreg 50mg in MICU overnight  -nitroglycerin gtt at 10 this morning        Objective     VITALS:  Vitals:    06/29/24 0600   BP: 168/76   Pulse: 83   Resp: 13   Temp:    SpO2: 95%       I/O last 3 completed shifts:  In: 1711.6 (29.6 mL/kg) [I.V.:1311.6 (22.7 mL/kg); Other:400]  Out: 1900 (32.8 mL/kg) [Other:1900]  Weight: 57.9 kg   No intake/output data recorded.    FiO2 (%):  [28 %-40 %] 28 %    PHYSICAL EXAM:  Physical Exam  Constitutional:       General: She is not in acute distress.     Appearance: Normal appearance.   HENT:      Head: Normocephalic and atraumatic.   Cardiovascular:      Rate and Rhythm: Normal rate and regular rhythm.      Heart sounds: Normal heart sounds.   Pulmonary:      Effort: Pulmonary effort is normal.      Breath sounds: Normal breath sounds.   Abdominal:      General: Bowel sounds are normal. There is no distension.      Palpations: Abdomen is soft. There is no mass.      Tenderness: There is no abdominal tenderness. There is no guarding.   Musculoskeletal:      Right lower leg: No edema.      Left lower leg: No edema.   Skin:     General: Skin is warm and dry.   Neurological:      General: No focal deficit present.      Mental Status: She is alert and oriented to person, place, and time.   Psychiatric:         Mood and Affect: Mood normal.         Behavior: Behavior normal.          MEDICATIONS:  amLODIPine, 10 mg, oral, Daily  apixaban, 5 mg, oral, BID  aspirin, 81 mg, oral, Daily  atorvastatin, 80 mg, oral, Nightly  carvedilol,  "50 mg, oral, BID  [Held by provider] cloNIDine, 0.2 mg, oral, q8h BELEN  [Held by provider] gabapentin, 300 mg, oral, Nightly  hydrALAZINE, 100 mg, oral, TID  [Held by provider] isosorbide mononitrate ER, 120 mg, oral, Daily  magnesium sulfate, , ,   [Held by provider] NIFEdipine ER, 90 mg, oral, Daily before breakfast  [Transfer Hold] oxygen, , inhalation, Continuous - Inhalation  pantoprazole, 40 mg, oral, Daily  [Held by provider] sacubitriL-valsartan, 1 tablet, oral, BID      nitroglycerin, 5-200 mcg/min, Last Rate: 10 mcg/min (06/29/24 0234)      PRN medications: acetaminophen, magnesium sulfate, melatonin    IMAGING:  === 05/26/23 ===    US THORACENTESIS    - Impression -  Uneventful thoracentesis, as detailed above. Right pleural space, 300  mL    I personally performed and/or directly supervised this study and was  present for the entire procedure.    Performed and dictated at [facility].  === 06/03/24 ===    CT CHEST WO IV CONTRAST    - Impression -  1.  There is interval improvement in pulmonary edema and left-sided  effusion with improving left basilar compressive atelectasis.  2. Persistent interstitial edema and anasarca and correlate with  fluid status.  3. Severe cardiovascular calcifications consistent with the history  of renal failure. Correlate with any concern for obstructive coronary  artery disease.    MACRO:  None    Signed by: Reece Hoover 6/4/2024 8:05 AM  Dictation workstation:   [id number]    MICRO  No results found for the last 90 days.         No lab exists for component: \"AGALPCRNB\" magnesium sulfate IV  - Omnicell Override Pull  methylPREDNISolone sod succinate (SOLU-Medrol) injection  - Omnicell Override Pull  ipratropium-albuteroL (Duo-Neb) nebulizer solution  - Omnicell Override Pull  nitroglycerin (Nitrostat) SL tablet  - Omnicell Override Pull  nitroglycerin in 5 % dextrose (Tridil) infusion  - Omnicell Override Pull  nitroglycerin (Nitrostat) " SL tablet 0.4 mg  nitroglycerin (Nitrostat) SL tablet 0.4 mg  ipratropium-albuteroL (Duo-Neb) 0.5-2.5 mg/3 mL nebulizer solution 9 mL  nitroglycerin (Tridil) 50 mg in dextrose 5% 250 mL (0.2 mg/mL) infusion (premix)  methylPREDNISolone sod succinate (SOLU-Medrol) injection 125 mg  oxygen (O2) therapy  heparin (porcine) injection 5,000 Units  amLODIPine (Norvasc) tablet 10 mg  apixaban (Eliquis) tablet 5 mg  aspirin EC tablet 81 mg  atorvastatin (Lipitor) tablet 80 mg  carvedilol (Coreg) tablet 50 mg  cloNIDine (Catapres) tablet 0.2 mg  gabapentin (Neurontin) capsule 300 mg  isosorbide mononitrate ER (Imdur) 24 hr tablet 120 mg  melatonin tablet 5 mg  NIFEdipine ER (Adalat CC) 24 hr tablet 90 mg  hydrALAZINE (Apresoline) tablet 100 mg  pantoprazole (ProtoNix) EC tablet 40 mg  sacubitriL-valsartan (Entresto)  mg per tablet 1 tablet  hydrALAZINE (Apresoline) tablet 50 mg  acetaminophen (Tylenol) tablet 975 mg  amLODIPine (Norvasc) tablet 10 mg  ondansetron (Zofran) injection 4 mg  ondansetron (Zofran) injection  - Omnicell Override Pull  oxyCODONE (Roxicodone) immediate release tablet 5 mg          Assessment & Plan  Stacey Heath is a 53 y.o. female with PMH ESRD 2/2 HTN and diabetes on dialysis T/Th/Sat via RUE AVF (last complete session 6/27/24) and poorly controlled hypertension, DMT2, COPD, brachial DVTon Eliquis (4/14/2024) admitted to MICU for hypertensive emergency and pulmonary edema. On BiPAP and nitroglycerine infusion. Will receive emergent dialysis overnight and reintroduce home BP regimen as tolerated.     UPDATES  -Reintroducing home BP medications to wean off nitroglycerin gtt  -Will receive dialysis again today  -Once off nitroglycerin gtt can transfer to floors      NEUROLOGIC  #Headache  -secondary to hypertension vs nitroglycerin gtt  -wean nitroglycerin gtt as tolerated  -tylenol 975mg tid scheduled     CARDIOVASCULAR  #Hypertensive emergency  #Pulmonary edema  -Presented with BP  247/109  -SBP reduced by 25% in first 24hrs  -nitroglycerin gtt, wean as tolerated  -restarting regimen of amlodipine 10mg daily, coreg 25mg bid, hydralazine 50mg tid, entresto  BID  -Will adjust BP medications/dosages as needed to wean off nitro  -Needs medication reconciliation and optimization of BP regimen to limit readmissions to the hospital          #HFpEF  ::Echo 6/2023 with EF 55-60%  ::Limited with GDMT given ESRD   -Toresmide on non-dialysis days    #Hx of Brachial DVT  -Continue Eliquis 5 mg BID     PULMONARY  #AHRF 2/2 volume overload/moderate pulmonary edema  -plan for dialysis overnight  -nephrology consulted  -goal O2 sat >94%, titrate oxygen as needed     #COPD?   -following with pulm fellow clinic at , no PFTs yet to confirm COPD diagnosis  -planned for outpatient CT chest, PFTs, and sleep study  -continue home albuterol prn and breo ellipta     RENAL/  #ESRD on HD TTS   #Hypertensive emergency  #Flash pulmonary edema   :: Access: RUE AVF   ::HD unit: [facility]  ::Primary nephrologist: Dr Garcia  ::EDW: Uncertain, ?55kg from outpatient record  -Emergent dialysis in MICU overnight  -Nephrology consulted and aware  -Dialysis again this morning        GASTROINTESTINAL  ZAIN      ENDOCRINE  #T2DM c/b retinopathy, nephropathy and neuropathy  -no medications     HEME/ONC  #Anemia of chronic disease I/s/o ESRD  -on Epo outpatient   -daily cbc     INFECTIOUS DISEASE  ZAIN     MSK/DERM  ::Recently broke her left toe   -Pain regimen   -PT/OT      FEN: Renal diet  A: PIV  O2: 2L NC  Drips: Nitroglycerin gtt  Abx: None  DVT PPx: Eliquis  GI PPx: None    CODE STATUS: FULL (confirmed on admission)  SURROGATE DECISION MAKER:     Villa Keyes MD  Internal Medicine, PGY-1

## 2024-06-29 NOTE — SIGNIFICANT EVENT
Nephrology entry     Ms Heath is a 55yo F with PMH ESRD on HD TTS, DM2, HTN, COPD who presented due to SOB, with elevated blood pressures. Started on a nitroglycerin drip and placed on BiPAP in the ED.     Labs: K 4.6, HCO3 30  CXR: Mild vascular congestion and interstitial edema. Bibasilar airspace opacities, may be  secondary to atelectasis or pneumonia. Small L pleural effusion.     Last dialyzed on 06/27  Patient is planned for admission to the MICU.     #ESRD on HD TTS   #Hypertensive emergency  #Flash pulmonary edema vs COPD exacerbation     Access: RUE AVF   HD unit: [facility]  Primary nephrologist: Dr Garcia  EDW: Uncertain, ?55kg from outpatient records         Plan:  -Will facilitate HD session tonight  -On call HD nurse informed  -Wean off Nitroglycerin drip  -Resume home antihypertensives  -Renal diet  -Renal multivitamin  -Dose medications to ESRD     Jacob Floyd MD   Nephrology fellow   Nephrology pager [phone]  Available via Epic Chat

## 2024-06-29 NOTE — ED PROVIDER NOTES
CC: Shortness of Breath     HPI:   Stacey Heath is a 54F w ESRD on iHD since 2021 (T/Th/Sat), COPD on 2 L nc, HFpEF, brachial DVT on 4/14/24 on eliquis, TIIDM who presented to the ED for shortness of breath.  Last dialysis session was yesterday, completed a full session.  Reports with past 24 hours now has had significantly increased work of breathing and shortness of breath.  On arrival to the emergency department she is tachypneic and ill-appearing.  Reports she has had chronic cough that is worsening in the past 24 hours as well.  Has had multiple ED visits and hospitalizations for flash pulmonary edema in setting of CHF in combination with COPD.  Denies fevers, chills, chest pain, abdominal pain, or changes in stool for us today.    Records Reviewed: Recent available ED and inpatient notes reviewed in EMR.    PMHx/PSHx:  Per HPI.   - has a past medical history of Acute pancreatitis ([facility]), Acute pyelonephritis due to bacteria, Acute renal failure (CMS-HCC), YARA (acute kidney injury) (CMS-HCC), Bilateral shoulder pain, Breast pain, Cardiac/pericardial tamponade ([facility]), Chronic renal failure syndrome, Community acquired pneumonia, Dependence on renal dialysis ([facility]), Disorder of sweat glands, Dry eye syndrome of bilateral lacrimal glands, Essential (primary) hypertension, Flash pulmonary edema (Multi), History of acute pancreatitis, Low grade squamous intraepithelial lesion (LGSIL) on cervicovaginal cytologic smear, Migraine headache, Migraines, Obstruction of urinary stent (CMS-HCC), 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, Other conditions influencing health status, Pain in upper limb, Periorbital cellulitis, Pleural effusion, Pneumonia, Sepsis (Multi), Status migrainosus, Type 2 myocardial infarction (Multi), Unspecified diastolic (congestive) heart failure (Multi), and Vaginal dryness.  - has a past surgical history that includes Tubal ligation (03/07/2017); Other surgical history (03/07/2017);  Appendectomy (03/07/2017); Other surgical history (03/07/2017); Other surgical history (12/08/2021); Other surgical history (12/08/2021); and CT angio abdomen w and or wo IV IV contrast (4/23/2023).  - has Abscess of axillary region; Anxiety; Astigmatism; Carotid bruit; Chronic migraine with aura; Congenital cataract; Diabetes mellitus type 2, uncomplicated (Multi); Inflammation of stomach and intestine; Coronary artery disease involving native coronary artery; Dialysis patient (CMS-HCC); Nephropathy; Essential hypertension; Iron deficiency anemia; Low grade squamous intraepith lesion on cytologic smear cervix (lgsil); Low grade squamous intraepithelial lesion on cytologic smear of cervix (LGSIL); Dialysis AV fistula malfunction (CMS-HCC); Migraine; Neuropathy; Nicotine use disorder; Other pericardial effusion (noninflammatory) ([facility]); Cough, unspecified; Dyspnea on exertion; Shoulder pain, bilateral; Sweating abnormality; Transplant; Acute pulmonary edema with heart disease (Multi); Respiratory failure (Multi); Poorly-controlled hypertension; Polyneuropathy due to type 2 diabetes mellitus (Multi); Obesity, Class I, BMI 30-34.9; Nuclear senile cataract of both eyes; Normocytic normochromic anemia; Low back pain; Hidradenitis; Complex dyslipidemia; Chronic heart failure with preserved ejection fraction (HFpEF) (Multi); ESRD (end stage renal disease) on dialysis (Multi); Hypervolemia; ERRONEOUS ENCOUNTER--DISREGARD; Malnutrition of moderate degree (Multi); Shortness of breath; Hypertensive emergency; Kidney transplant candidate; and Flash pulmonary edema (Multi) on their problem list.    Medications:  Current Outpatient Medications   Medication Instructions    acetaminophen (TYLENOL) 650 mg, oral, Every 4 hours PRN    albuterol (Ventolin HFA) 90 mcg/actuation inhaler 2 puffs, inhalation, 2 times daily    albuterol 1.25 mg, nebulization, Every 6 hours PRN    amLODIPine (Norvasc) 10 mg tablet TAKE 1 TABLET BY MOUTH  ONCE DAILY BEFORE DIALYSIS    amLODIPine (NORVASC) 10 mg, oral, Daily    apixaban (ELIQUIS) 5 mg, oral, 2 times daily    aspirin 81 mg, oral, Daily    atorvastatin (LIPITOR) 80 mg, oral, Nightly    calcium acetate (PHOSLO) 1,334 mg, oral, 3 times daily (morning, midday, late afternoon)    carvedilol (COREG) 50 mg, oral, 2 times daily    cloNIDine (CATAPRES) 0.2 mg, oral, Every 8 hours scheduled    epoetin joceline (EPOGEN,PROCRIT) 10,000 Units, subcutaneous, 3 times weekly    fluticasone (Flonase) 50 mcg/actuation nasal spray 2 sprays, Each Nostril, Daily, Shake gently. Before first use, prime pump. After use, clean tip and replace cap.    fluticasone furoate-vilanteroL (Breo Ellipta) 100-25 mcg/dose inhaler 1 puff, inhalation, Daily RT    fluticasone propion-salmeteroL (Advair Diskus) 100-50 mcg/dose diskus inhaler 1 puff, inhalation, Daily    gabapentin (NEURONTIN) 300 mg, oral, Nightly    hydrALAZINE (APRESOLINE) 100 mg, oral, 3 times daily    hydrOXYzine HCL (ATARAX) 10 mg, oral, Every 6 hours PRN    isosorbide mononitrate ER (IMDUR) 120 mg, oral, Daily, Do not crush or chew.    lidocaine (Lidoderm) 5 % patch 1 patch, transdermal, Daily PRN, Remove & discard patch within 12 hours or as directed by MD.    melatonin 5 mg, oral, Nightly PRN    metoclopramide (Reglan) 5 mg tablet TAKE 1 TABLET BY MOUTH EVERY 6 HOURS AS NEEDED    NIFEdipine ER (ADALAT CC) 90 mg, oral, Daily before breakfast, Do not crush, chew, or split.    nutritional drink (Ensure) liquid 118 mL, oral, 2 times daily    ondansetron (ZOFRAN) 4 mg, oral, Every 8 hours PRN    ondansetron ODT (ZOFRAN-ODT) 4 mg, oral, Every 12 hours PRN    pantoprazole (PROTONIX) 40 mg, oral, Daily, Do not crush, chew, or split.    polyethylene glycol (GLYCOLAX, MIRALAX) 17 g, oral, Daily PRN    sacubitriL-valsartan (Entresto)  mg tablet 1 tablet, oral, 2 times daily    sodium chloride (Ocean) 0.65 % nasal spray 1 spray, Each Nostril, 4 times daily PRN    SUMAtriptan  (IMITREX) 25 mg, oral, Every 48 hours, May repeat dose once in 2 hours if no relief.  Do not exceed 2 doses in 24 hours.    torsemide (Demadex) 20 mg tablet take 1 tablet by mouth as directed. Take once daily on dialysis days, twice daily on nondialysis days.        Allergies:  Iodine, Bioflavonoids, Codeine, Flowers, Hydromorphone, and Shellfish containing products    Social History:  - Tobacco:  reports that she has quit smoking. Her smoking use included cigarettes. She has never used smokeless tobacco.   - Alcohol:  reports that she does not currently use alcohol.   - Illicit Drugs:  reports no history of drug use.     ROS:  Per HPI.     ???????????????????????????????????????????????????????????????  Triage Vitals:  T 36.1 °C (97 °F)  HR (!) 104  BP (!) 247/109  RR (!) 26  O2 (!) 75 % None (Room air)    General: Patient appears toxic, tachypneic, and short of breath on arrival.  Head: Normocephalic. Atraumatic.  Neck: No meningismus.  No midline cervical spine tenderness with palpation.  FROM. No gross masses.   Eyes: EOMI. No scleral icterus or injection.  ENT: Moist mucous membranes, no apparent trauma or lesions.  CV: Regular rhythm. No murmurs, rubs, gallops appreciated. 2+ radial pulses bilaterally.  Resp: Significantly increased work of breathing.  Wheezes in bilateral lung bases.  Decreased air movement bilaterally.    GI: Soft, non-distended.  No tenderness with palpation.  No rebound tenderness or guarding. No palpable masses.  : No suprapubic or CVA tenderness.  MSK: Full ROM in bilateral upper and lower extremities. No gross step offs or deformities.  EXT: No peripheral edema, contusions, or wounds.  Skin: Warm and dry, no rashes or lesions.  Neuro: Alert and oriented.  Cranial nerves II-XII grossly intact.  No focal neurological deficits.  Motor and sensation intact throughout.  Speech fluent.  Psych: Appropriate mood and behavior, converses and responds appropriately.          [place] Coma  Scale Score: 15                  ???????????????????????????????????????????????????????????????    Results: Relevant laboratory and radiographic results were reviewed and independently interpreted by myself.  As necessary, they are commented on in the ED Course.    EKG: EKG interpreted by myself. Please see ED Course for full interpretation.    Medical Decision Making   Patient is a 54-year-old female presenting to the emergency department today for shortness of breath.  On arrival, blood pressure 240/110 with respirations in the 30s.  Point-of-care ultrasound showed bilateral B-lines.  Patient given 0.8 mg nitroglycerin and was started on nitro drip in the emergency department, Systolic goal 140-160.  Was placed on BiPAP for positive pressure support with improvement in oxygenation and mental status.  Venous blood gas on arrival showed a normal pH at 7.37 CO2 54.  On auscultation patient does have bilateral wheezes to the lower lung fields consistent with her chronic COPD.  Also given DuoNebs and Solu-Medrol here in the emergency department for symptomatic management.      Update: After nitroglycerin and BiPAP, patient's blood pressure now into the 140 systolic and mentating much better.  Currently on nitro drip at 90 mcg a minute saturating 97% on 40 FiO2 at 16/8.  Labs notable for creatinine at 7.96 consistent with known ESRD.  BNP elevated at 5000 consistent with CHF exacerbation. Hemoglobin 9 today, worse than patient's baseline of 10-12 possibly in the setting of fluid overload. Given patient's ongoing fluid overload and recurrent/pulmonary edema, I think she would benefit from MICU admission for urgent dialysis and further management.      ED Course as of 06/30/24 0107   Fri Jun 28, 2024 2050 Regular rate, regular rhythm.  SC, QRS, QTc intervals within normal limits.  Deep T wave inversions in leads I and aVL V5 and V6 all similar to previous.  No ST elevations, depressions that are new today. [AS]      ED  Course User Index  [AS] Shawn Jasmine MD         Diagnoses as of 06/30/24 0107   Flash pulmonary edema (Multi)       Social Determinants Limiting Care:  None identified    Disposition:   Admit    Shawn Jasmine MD  Emergency Medicine PGY2      Critical Care    Performed by: Geovany Castro MD MPH  Authorized by: Geovany Castro MD MPH    Critical care provider statement:     Critical care time (minutes):  35    Critical care time was exclusive of:  Separately billable procedures and treating other patients and teaching time    Critical care was necessary to treat or prevent imminent or life-threatening deterioration of the following conditions:  Respiratory failure    Critical care was time spent personally by me on the following activities:  Evaluation of patient's response to treatment, examination of patient, interpretation of cardiac output measurements, obtaining history from patient or surrogate, review of old charts, re-evaluation of patient's condition, ordering and review of radiographic studies, ordering and review of laboratory studies and ordering and performing treatments and interventions    Care discussed with: admitting provider     ? My[employer] last updated 6/30/2024 1:07 AM        Shawn Jasmine MD  Resident  06/30/24 0107      -------------------------------------------  This patient was seen by the resident physician.  I have seen and examined the patient, agree with the workup, evaluation, management and diagnosis. I reviewed and edited the above documentation where necessary.     I have looked at the EKG and agree with the interpretation.    Geovany Castro MD   Attending Physician      Geovany Castro MD MPH  06/30/24 0741

## 2024-06-29 NOTE — PROGRESS NOTES
"Pharmacy Medication History Review    Stacey Heath is a 54 y.o. female admitted for Flash pulmonary edema (Multi). Pharmacy reviewed the patient's [id number] medications and allergies for accuracy.    The list below reflects the updated PTA list. Comments regarding how patient may be taking medications differently can be found in the Admit Orders Activity  Prior to Admission Medications   Prescriptions  Chart Reported?   SUMAtriptan (Imitrex) 25 mg tablet  Yes   Sig: Take 1 tablet (25 mg) by mouth every other day for 5 doses.   May repeat dose once in 2 hours if no relief.    Do not exceed 2 doses in 24 hours.  --> Last Fill 5/31/24, #5, 13 day   acetaminophen (Tylenol) 325 mg tablet  Yes   Sig: Take 2 tablets (650 mg) by mouth every 4 hours if needed   for mild pain (1 - 3) or moderate pain (4 - 6).   albuterol (Ventolin HFA) 90 mcg/actuation inhaler  Yes   Sig: Inhale 2 puffs 2 times a day.   albuterol 1.25 mg/3 mL nebulizer solution  Yes   Sig: Take 3 mL (1.25 mg) by nebulization every 6 hours   if needed for wheezing.   amLODIPine (Norvasc) 10 mg tablet  Yes   Sig: TAKE 1 TABLET BY MOUTH ONCE DAILY  BEFORE DIALYSIS  --> Last Filled 5/31/24, 30 day  --> Per 4/24/24 Family Medicine Note: \"Instructed patient to continue taking nifedipine daily and amlodipine before dialysis as prescribed inpatient.\"      apixaban (Eliquis) 5 mg tablet  Yes   Sig: Take 1 tablet (5 mg) by mouth 2 times a day.   aspirin 81 mg EC tablet  Yes   Sig: Take 1 tablet (81 mg) by mouth once daily.   atorvastatin (Lipitor) 80 mg tablet  Yes   Sig: Take 1 tablet (80 mg) by mouth once daily at bedtime.   calcium acetate (Phoslo) 667 mg capsule  Yes   Sig: Take 2 capsules (1,334 mg) by mouth 3 times daily   (morning, midday, late afternoon).   carvedilol (Coreg) 25 mg tablet  Yes   Sig: Take 1 tablet (25 mg) by mouth 2 times a day.  --> Last fill was for 25 mg BID (at [facility], 6/19/24, 45 day).  --> History of 50 mg BID (at ), but last " fill of 50 mg BID was 4/11/24, 30 day.  --> Entered into PTA List as 25 mg BID; reflects most recent ([facility]) outpatient RX sig.    cloNIDine (Catapres) 0.2 mg tablet  Yes   Sig: Take 1 tablet (0.2 mg) by mouth every 8 hours.  --> Last fill 4/11/24, 30 day, no refills. Pt would be out of medication.  --> Held at [facility] & Not found on 6/19/24 [facility] Discharge. Restart (?).   epoetin joceline (Epogen,Procrit) 10,000 unit/mL injection  Yes   Sig: Inject 1 mL (10,000 Units) under the skin 3 times a week.   fluticasone (Flonase) 50 mcg/actuation nasal spray  Yes   Sig: Administer 2 sprays into each nostril once daily as needed.   --> Last Fill 1/20/24, left in PTA List as PRN.   fluticasone furoate-vilanteroL (Breo Ellipta) 100-25 mcg/dose inhaler  Yes   Sig: Inhale 1 puff once daily.      gabapentin (Neurontin) 300 mg capsule  Yes   Sig: Take 1 capsule (300 mg) by mouth once daily at bedtime.  --> Last Fill 4/30/24, 30 day.   hydrALAZINE (Apresoline) 50 mg tablet  Yes   Sig: Take 1 tablet (50 mg) by mouth 3 times a day.  --> Last Fill by [facility] 6/19/24 was for 50 mg TID.  --> History of 100 mg TID (at ), but last fill of 100 mg TID was 4/11/24, 30 day.  --> Entered into PTA as 50 mg TID; reflects most recent ([facility]) outpatient RX sig.    hydrOXYzine HCL (Atarax) 10 mg tablet  Yes   Sig: Take 1 tablet (10 mg) by mouth every 6 hours if needed for itching.   isosorbide mononitrate ER (Imdur) 120 mg 24 hr tablet  Yes   Sig: Take 1 tablet (120 mg) by mouth once daily.   --> Last Fill 4/11/24, 30 day, 0 refills. Pt would be out of medication.  --> Held at [facility] / Not found on F 6/19/24 Discharge. Restart (?).   lidocaine (Lidoderm) 5 % patch  Yes   Sig: Place 1 patch on the skin once daily as needed.    melatonin 5 mg tablet  Yes   Sig: Take 1 tablet (5 mg) by mouth as needed at bedtime for sleep.   ondansetron (Zofran) 4 mg tablet  Yes   Sig: Take 1 tablet (4 mg) by mouth every 8 hours   if needed for nausea or vomiting.   ondansetron ODT  "(Zofran-ODT) 4 mg disintegrating tablet  Yes   Sig: Take 1 tablet (4 mg) by mouth every 12 hours   if needed for nausea or vomiting.   pantoprazole (ProtoNix) 40 mg EC tablet  Yes   Sig: Take 1 tablet (40 mg) by mouth once daily.    polyethylene glycol (Glycolax, Miralax) 17 gram/dose powder  Yes   Sig: Take 17 g by mouth once daily as needed (constipation).   sacubitriL-valsartan (Entresto)  mg tablet  Yes   Sig: Take 1 tablet by mouth 2 times a day.   NIFEdipine ER (Adalat CC) 90 mg 24 hr tablet  Yes   Sig: Take 1 tablet (90 mg) by mouth once daily in the morning.   --> Last fill 4/11/24, 30 day, 0 refills. (Pt would be out.)   --> Not found on [facility] 6/19/24 Discharge. Restart (?).  --> Per 4/24/24 Family Medicine Note: \"Instructed patient to continue taking nifedipine daily and amlodipine before dialysis as prescribed inpatient.\"      torsemide (Demadex) 20 mg tablet  Yes   Sig: take 1 tablet by mouth as directed.   Take once daily on dialysis days, twice daily on nondialysis days.              The list below reflects the updated allergy list. Please review each documented allergy for additional clarification and justification.  Allergies  Reviewed by Abigail Marcum RN on 6/28/2024        Severity Reactions Comments    Iodine High Hives, Itching, Unknown     Bioflavonoids Not Specified Swelling     Codeine Not Specified Itching, Hives, Unknown Tolerates percocet   Tolerates percocet Tolerates percocet    Flowers Not Specified Itching     Hydromorphone Not Specified Itching     Shellfish Containing Products Not Specified Swelling SEAFOOD          Patient accepts M2B at discharge. Pt uses [facility].    Sources used to complete the med history include:  6/19/24 [facility] Discharge Summary (Discharge Medication)  5/31/24  Discharge Summary (Home Medications)  Previous Med Rec at  (5/29/24)  Patient Interview (Limited participation in reviewing medications; states she takes what's on both [facility] and  " lists; did not want to go through each med at this time.)   Chart Review (Recent Office Visits)  [facility] (gabapentin 300 mg)      Below are additional concerns with the patient's PTA list.     See additional comments as listed underneath medications in above PTA Table.    Patient has had recent admissions to  (Discharge 5/31/24), and [facility] (Discharge 6/19/24).     During the more recent [facility] admission, her BP medications appear to be changed: carvedilol decreased from 50 mg BID to 25 mg BID, hydralazine 100 mg TID to 50 mg TID, Imdur and clonidine were held, nifedipine stopped(?)/missing(?) from discharge summary. (Pt normally took nifedipine daily and amlodipine before dialysis sessions.)     The PTA Med List above reflects the changed carvedilol and hydralazine doses, as is how the prescriptions were last filled and dispensed at [facility] (on 6/19/24). Further clarification would be needed on decision to continue most recent [facility] doses, or revert back to older  doses.     Based on Epic Dispense Report, it appears she would be out of certain medications that need refills:  clonidine, Imdur, nifedipine (all last filled 4/11/24, 30 days). These medications were NOT found on F 6/19/24 Discharge Summary. Further clarification would be needed if these are required and/or to be restarted on this admission.     Additionally, on future discharge for her outpatient home regimen, please ensure patient has sufficient refills on all medications. (Preferred [facility] pharmacy / [facility]).      ----------------------------  Feliciano Fernandez, Mitzi, [facility]  Transitions of Care Pharmacist  Medication reconciliation complete  Please reach out via [employer] Secure Chat for questions,   or if no response call [employer] or [employer].   Meds Ambulatory and Retail Services

## 2024-06-30 VITALS
OXYGEN SATURATION: 93 % | RESPIRATION RATE: 18 BRPM | DIASTOLIC BLOOD PRESSURE: 75 MMHG | HEART RATE: 70 BPM | BODY MASS INDEX: 28.72 KG/M2 | SYSTOLIC BLOOD PRESSURE: 142 MMHG | WEIGHT: 124.12 LBS | TEMPERATURE: 97 F | HEIGHT: 55 IN

## 2024-06-30 LAB
ALBUMIN SERPL BCP-MCNC: 3.6 G/DL (ref 3.4–5)
ANION GAP SERPL CALC-SCNC: 15 MMOL/L (ref 10–20)
BASOPHILS # BLD AUTO: 0.04 X10*3/UL (ref 0–0.1)
BASOPHILS NFR BLD AUTO: 0.6 %
BUN SERPL-MCNC: 14 MG/DL (ref 6–23)
CALCIUM SERPL-MCNC: 9.1 MG/DL (ref 8.6–10.6)
CHLORIDE SERPL-SCNC: 95 MMOL/L (ref 98–107)
CO2 SERPL-SCNC: 31 MMOL/L (ref 21–32)
CREAT SERPL-MCNC: 3.75 MG/DL (ref 0.5–1.05)
EGFRCR SERPLBLD CKD-EPI 2021: 14 ML/MIN/1.73M*2
EOSINOPHIL # BLD AUTO: 0.17 X10*3/UL (ref 0–0.7)
EOSINOPHIL NFR BLD AUTO: 2.5 %
ERYTHROCYTE [DISTWIDTH] IN BLOOD BY AUTOMATED COUNT: 17.2 % (ref 11.5–14.5)
GLUCOSE SERPL-MCNC: 91 MG/DL (ref 74–99)
HCT VFR BLD AUTO: 28.3 % (ref 36–46)
HGB BLD-MCNC: 8.4 G/DL (ref 12–16)
IMM GRANULOCYTES # BLD AUTO: 0.04 X10*3/UL (ref 0–0.7)
IMM GRANULOCYTES NFR BLD AUTO: 0.6 % (ref 0–0.9)
LYMPHOCYTES # BLD AUTO: 0.93 X10*3/UL (ref 1.2–4.8)
LYMPHOCYTES NFR BLD AUTO: 13.7 %
MAGNESIUM SERPL-MCNC: 2.14 MG/DL (ref 1.6–2.4)
MCH RBC QN AUTO: 27.5 PG (ref 26–34)
MCHC RBC AUTO-ENTMCNC: 29.7 G/DL (ref 32–36)
MCV RBC AUTO: 93 FL (ref 80–100)
MONOCYTES # BLD AUTO: 0.45 X10*3/UL (ref 0.1–1)
MONOCYTES NFR BLD AUTO: 6.6 %
NEUTROPHILS # BLD AUTO: 5.14 X10*3/UL (ref 1.2–7.7)
NEUTROPHILS NFR BLD AUTO: 76 %
NRBC BLD-RTO: 0 /100 WBCS (ref 0–0)
PHOSPHATE SERPL-MCNC: 4.8 MG/DL (ref 2.5–4.9)
PLATELET # BLD AUTO: 241 X10*3/UL (ref 150–450)
POTASSIUM SERPL-SCNC: 4.6 MMOL/L (ref 3.5–5.3)
RBC # BLD AUTO: 3.06 X10*6/UL (ref 4–5.2)
SODIUM SERPL-SCNC: 136 MMOL/L (ref 136–145)
WBC # BLD AUTO: 6.8 X10*3/UL (ref 4.4–11.3)

## 2024-06-30 PROCEDURE — 83735 ASSAY OF MAGNESIUM: CPT

## 2024-06-30 PROCEDURE — 2500000001 HC RX 250 WO HCPCS SELF ADMINISTERED DRUGS (ALT 637 FOR MEDICARE OP)

## 2024-06-30 PROCEDURE — 80069 RENAL FUNCTION PANEL: CPT

## 2024-06-30 PROCEDURE — 2500000001 HC RX 250 WO HCPCS SELF ADMINISTERED DRUGS (ALT 637 FOR MEDICARE OP): Performed by: STUDENT IN AN ORGANIZED HEALTH CARE EDUCATION/TRAINING PROGRAM

## 2024-06-30 PROCEDURE — 1210000001 HC SEMI-PRIVATE ROOM DAILY

## 2024-06-30 PROCEDURE — 36415 COLL VENOUS BLD VENIPUNCTURE: CPT

## 2024-06-30 PROCEDURE — 85025 COMPLETE CBC W/AUTO DIFF WBC: CPT

## 2024-06-30 PROCEDURE — 2500000004 HC RX 250 GENERAL PHARMACY W/ HCPCS (ALT 636 FOR OP/ED): Performed by: STUDENT IN AN ORGANIZED HEALTH CARE EDUCATION/TRAINING PROGRAM

## 2024-06-30 RX ORDER — CYCLOBENZAPRINE HCL 10 MG
5 TABLET ORAL ONCE
Status: COMPLETED | OUTPATIENT
Start: 2024-06-30 | End: 2024-06-30

## 2024-06-30 RX ORDER — HYDRALAZINE HYDROCHLORIDE 25 MG/1
50 TABLET, FILM COATED ORAL 3 TIMES DAILY
Status: ACTIVE | OUTPATIENT
Start: 2024-06-30

## 2024-06-30 RX ORDER — ISOSORBIDE MONONITRATE 30 MG/1
120 TABLET, EXTENDED RELEASE ORAL NIGHTLY
Status: DISCONTINUED | OUTPATIENT
Start: 2024-06-30 | End: 2024-06-30

## 2024-06-30 RX ORDER — LANOLIN ALCOHOL/MO/W.PET/CERES
400 CREAM (GRAM) TOPICAL ONCE
Status: COMPLETED | OUTPATIENT
Start: 2024-06-30 | End: 2024-06-30

## 2024-06-30 RX ORDER — NIFEDIPINE 60 MG/1
60 TABLET, FILM COATED, EXTENDED RELEASE ORAL
Status: DISPENSED | OUTPATIENT
Start: 2024-06-30

## 2024-06-30 RX ORDER — ISOSORBIDE MONONITRATE 60 MG/1
120 TABLET, EXTENDED RELEASE ORAL DAILY
Status: DISPENSED | OUTPATIENT
Start: 2024-06-30

## 2024-06-30 RX ADMIN — CLONIDINE HYDROCHLORIDE 0.2 MG: 0.1 TABLET ORAL at 08:01

## 2024-06-30 RX ADMIN — ISOSORBIDE MONONITRATE 120 MG: 30 TABLET, EXTENDED RELEASE ORAL at 01:17

## 2024-06-30 RX ADMIN — CARVEDILOL 50 MG: 25 TABLET, FILM COATED ORAL at 08:01

## 2024-06-30 RX ADMIN — PANTOPRAZOLE SODIUM 40 MG: 40 TABLET, DELAYED RELEASE ORAL at 08:02

## 2024-06-30 RX ADMIN — HYDRALAZINE HYDROCHLORIDE 100 MG: 100 TABLET ORAL at 08:01

## 2024-06-30 RX ADMIN — APIXABAN 5 MG: 5 TABLET, FILM COATED ORAL at 08:01

## 2024-06-30 RX ADMIN — CYCLOBENZAPRINE 5 MG: 10 TABLET, FILM COATED ORAL at 20:50

## 2024-06-30 RX ADMIN — CYCLOBENZAPRINE 5 MG: 10 TABLET, FILM COATED ORAL at 14:17

## 2024-06-30 RX ADMIN — ACETAMINOPHEN 975 MG: 325 TABLET ORAL at 08:01

## 2024-06-30 RX ADMIN — ASPIRIN 81 MG: 81 TABLET, COATED ORAL at 08:01

## 2024-06-30 RX ADMIN — ISOSORBIDE MONONITRATE 120 MG: 60 TABLET, EXTENDED RELEASE ORAL at 19:56

## 2024-06-30 RX ADMIN — TORSEMIDE 40 MG: 20 TABLET ORAL at 08:02

## 2024-06-30 RX ADMIN — CARVEDILOL 50 MG: 25 TABLET, FILM COATED ORAL at 19:56

## 2024-06-30 RX ADMIN — Medication 400 MG: at 14:17

## 2024-06-30 RX ADMIN — GABAPENTIN 100 MG: 100 CAPSULE ORAL at 19:56

## 2024-06-30 RX ADMIN — SACUBITRIL AND VALSARTAN 1 TABLET: 97; 103 TABLET, FILM COATED ORAL at 08:01

## 2024-06-30 RX ADMIN — ACETAMINOPHEN 975 MG: 325 TABLET ORAL at 19:57

## 2024-06-30 RX ADMIN — ATORVASTATIN CALCIUM 40 MG: 40 TABLET, FILM COATED ORAL at 19:57

## 2024-06-30 RX ADMIN — ACETAMINOPHEN 975 MG: 325 TABLET ORAL at 14:17

## 2024-06-30 RX ADMIN — APIXABAN 5 MG: 5 TABLET, FILM COATED ORAL at 19:56

## 2024-06-30 ASSESSMENT — COGNITIVE AND FUNCTIONAL STATUS - GENERAL
MOBILITY SCORE: 24
DAILY ACTIVITIY SCORE: 24

## 2024-06-30 ASSESSMENT — PAIN DESCRIPTION - LOCATION: LOCATION: TOE (COMMENT WHICH ONE)

## 2024-06-30 ASSESSMENT — PAIN SCALES - GENERAL
PAINLEVEL_OUTOF10: 4
PAINLEVEL_OUTOF10: 0 - NO PAIN

## 2024-06-30 ASSESSMENT — PAIN - FUNCTIONAL ASSESSMENT
PAIN_FUNCTIONAL_ASSESSMENT: 0-10

## 2024-06-30 NOTE — CARE PLAN
Problem: Pain - Adult  Goal: Verbalizes/displays adequate comfort level or baseline comfort level  Outcome: Progressing     Problem: Safety - Adult  Goal: Free from fall injury  Outcome: Progressing     Problem: Discharge Planning  Goal: Discharge to home or other facility with appropriate resources  Outcome: Progressing     Problem: Chronic Conditions and Co-morbidities  Goal: Patient's chronic conditions and co-morbidity symptoms are monitored and maintained or improved  Outcome: Progressing     Problem: Fall/Injury  Goal: Not fall by end of shift  Outcome: Progressing  Goal: Be free from injury by end of the shift  Outcome: Progressing  Goal: Verbalize understanding of personal risk factors for fall in the hospital  Outcome: Progressing  Goal: Verbalize understanding of risk factor reduction measures to prevent injury from fall in the home  Outcome: Progressing  Goal: Use assistive devices by end of the shift  Outcome: Progressing  Goal: Pace activities to prevent fatigue by end of the shift  Outcome: Progressing     Problem: Diabetes  Goal: Achieve decreasing blood glucose levels by end of shift  Outcome: Progressing  Goal: Increase stability of blood glucose readings by end of shift  Outcome: Progressing  Goal: Decrease in ketones present in urine by end of shift  Outcome: Progressing  Goal: Maintain electrolyte levels within acceptable range throughout shift  Outcome: Progressing  Goal: Maintain glucose levels >70mg/dl to <250mg/dl throughout shift  Outcome: Progressing  Goal: No changes in neurological exam by end of shift  Outcome: Progressing  Goal: Learn about and adhere to nutrition recommendations by end of shift  Outcome: Progressing  Goal: Vital signs within normal range for age by end of shift  Outcome: Progressing  Goal: Increase self care and/or family involovement by end of shift  Outcome: Progressing  Goal: Receive DSME education by end of shift  Outcome: Progressing     Problem: Pain  Goal:  Takes deep breaths with improved pain control throughout the shift  Outcome: Progressing  Goal: Turns in bed with improved pain control throughout the shift  Outcome: Progressing  Goal: Walks with improved pain control throughout the shift  Outcome: Progressing  Goal: Performs ADL's with improved pain control throughout shift  Outcome: Progressing  Goal: Participates in PT with improved pain control throughout the shift  Outcome: Progressing  Goal: Free from opioid side effects throughout the shift  Outcome: Progressing  Goal: Free from acute confusion related to pain meds throughout the shift  Outcome: Progressing

## 2024-06-30 NOTE — SIGNIFICANT EVENT
Received signout on patient to come to hospitalist team D service.    Patient admitted with hypertensive urgency, flash pulmonary edema, and respiratory failure. Patient has been on telemetry for 24 hours while on cardene and nitroglycerin gtt. Last infusion stopped shortly after midnight this AM. Multiple medication changes made and plans for ongoing dialysis. Placed request for telemetry bed for close monitoring of rhythm and vital signs given her clinical presentation, med changes, and current picture.     Per MICU team, patient will not be going to telemetry.     Accepting RN updated.    Electronically signed by Dianne Tam DO on 06/30/24 at 10:17 AM

## 2024-06-30 NOTE — SIGNIFICANT EVENT
ICU to [facility] Transfer Summary     I:  ICU Admission Reason & Brief ICU Course:    Admitted to MICU AM of 6/29 for flash pulmonary edema 2/2 HTN emergency requiring BPAP (no VBG, was placed on BPAP in ED d/t chart Hx COPD) and nitro ggt. Quickly weaned to LFNC (none at baseline) later that morning. Received emergent iHD 2x 6/29 w/ 2-3 L UF (w/ indication overload/HTN). Per pt, had not missed her last HD session. Nitro ggt changed to nicardipine ggt d/t headache on nitro. Eventually weaned off nicardipine ggt overnight 6/29 into 6/30 (~midnight) by altering home meds (changes below). Some c/f rebound HTN 2/2 clonidine withdrawal. Though clonidine was not on med list from [facility] 6/14/2024 DC summary, pt thinks she is taking at home. Clonidine resumed on this admission. Note that sac-lester seem to have been started for resistant HTN on prior admissions, normal EF on recent TTE.    To f/u:  -cont to optimize PO anti-HTNs  -pt has a lot of prior BP meds at home and seems unclear on her most recent scripts, would benefit from counseling on what meds she is to take  -nephro following, cont iHD per [facility] sched. Last iHD 6/29      C: Code Status/DPOA Info/Goals of Care/ACP Note    Full Code  DPOA/Contact Number: Hai Pinedo (spouse) [phone]    U: Unprescribing & Pertinent High-Risk Medications    Changes to home meds: carvedilol 25mg --> 50mg, amlodipine 10mg --> nifedipine 60mg, restarted clonidine 0.2mg, hydral 50mg --> 100mg, resumed imdur     Anticoagulation: Yes - Therapeutic Apixaban (Hx DVT)    Antibiotics:   [x] N/A - no current planned antimicrobioals      P: Pending Tests at the Time of Transfer   none      A: Active consultants, including Rehab:   []  Subspecialty Consultants: nephrology iHD Tu, Th, Sa  [x]  PT  [x]  OT  []  SLP  []  Wound Care    U: Uncertainty Measure/Diagnostic Pause:    Working diagnosis at the time of transfer HTN emergency c/b flash pulmonary edema.     Diagnosis Degree of Certainty: 1.  High degree of certainty about the clinical diagnosis.     S: Summary of Major Problems and To-Dos:     NEUROLOGIC  #Headache (improved)  -secondary to hypertension vs nitroglycerin gtt  -tylenol 975mg tid scheduled     CARDIOVASCULAR  #Hypertensive emergency  #Pulmonary edema  -Presented with /109  -SBP reduced by 25% in first 24hrs  -weaned off ggt's  -adjusted home meds as above  -Needs medication reconciliation and optimization of BP regimen to limit readmissions to the hospital          #HFpEF  ::Echo 6/2023 with EF 55-60%  -[place]mide on non-dialysis days, pt oliguric    #Hx of Brachial DVT  -Continue Eliquis 5 mg BID     PULMONARY  #AHRF 2/2 volume overload/moderate pulmonary edema (improving)  -goal O2 sat >94%, titrate oxygen as needed     #COPD?   -following with pulm fellow clinic at , no PFTs yet to confirm COPD diagnosis  -planned for outpatient CT chest, PFTs, and sleep study  -continue home albuterol prn     RENAL/  #ESRD on HD TTS   #Hypertensive emergency  #Flash pulmonary edema   :: Access: RUE AVF   ::HD unit: [facility]  ::Primary nephrologist: Dr Garcia  ::EDW: Uncertain, ?55kg from outpatient record  -Emergent dialysis in MICU overnight  -Nephrology consulted and aware  -Dialysis again this morning        GASTROINTESTINAL  ZAIN      ENDOCRINE  #T2DM c/b retinopathy, nephropathy and neuropathy  -no medications     HEME/ONC  #Anemia of chronic disease I/s/o ESRD  -on Epo outpatient   -daily cbc     INFECTIOUS DISEASE  ZAIN     MSK/DERM  ::Recently broke her left toe   -Pain regimen   -PT/OT        FEN: Renal diet  A: PIV, R UE AVF  O2: 2L NC  Drips: none  Abx: None  DVT PPx: Eliquis  GI PPx: None     CODE STATUS: FULL (confirmed on admission)    To-do list prior to transfer:  [x]Sign out        E: Exam, including Lines/Drains/Airways & Data Review:   Constitutional:       General: She is not in acute distress.     Appearance: Normal appearance.   HENT:      Head: Normocephalic and  "atraumatic.   Cardiovascular:      Rate and Rhythm: Normal rate and regular rhythm.      Heart sounds: Normal heart sounds.   Pulmonary:      Effort: Pulmonary effort is normal.      Breath sounds: Normal breath sounds.   Abdominal:      General: Bowel sounds are normal. There is no distension.      Palpations: Abdomen is soft. There is no mass.      Tenderness: There is no abdominal tenderness. There is no guarding.   Musculoskeletal:      Right lower leg: No edema.      Left lower leg: No edema.   Skin:     General: Skin is warm and dry.   Neurological:      General: No focal deficit present.      Mental Status: She is alert and oriented to person, place, and time.   Psychiatric:         Mood and Affect: Mood normal.         Behavior: Behavior normal.   Difficult airway? No  Lines/drains assessed for removal? Yes, describe: no lines to remove    Visit Vitals  /56   Pulse 74   Temp 37.2 °C (99 °F) (Temporal)   Resp 16   Ht 1.397 m (4' 7\")   Wt 57.1 kg (125 lb 14.1 oz)   SpO2 100%   BMI 29.26 kg/m²   OB Status Postmenopausal   Smoking Status Former   BSA 1.49 m²        Within 30 minutes of the patient physically leaving the floor, a Floor Readiness Note needs to be placed with updated vitals.         "

## 2024-06-30 NOTE — CARE PLAN
Problem: Skin  Goal: Decreased wound size/increased tissue granulation at next dressing change  Outcome: Progressing  Flowsheets (Taken 6/29/2024 2127)  Decreased wound size/increased tissue granulation at next dressing change: Protective dressings over bony prominences  Goal: Participates in plan/prevention/treatment measures  Outcome: Progressing  Flowsheets (Taken 6/29/2024 2127)  Participates in plan/prevention/treatment measures: Increase activity/out of bed for meals  Goal: Prevent/manage excess moisture  Outcome: Progressing  Flowsheets (Taken 6/29/2024 2127)  Prevent/manage excess moisture: Moisturize dry skin  Goal: Prevent/minimize sheer/friction injuries  Outcome: Progressing  Flowsheets (Taken 6/29/2024 2127)  Prevent/minimize sheer/friction injuries: Increase activity/out of bed for meals  Goal: Promote/optimize nutrition  Outcome: Progressing  Flowsheets (Taken 6/29/2024 2127)  Promote/optimize nutrition:   Monitor/record intake including meals   Consume > 50% meals/supplements  Goal: Promote skin healing  Outcome: Progressing  Flowsheets (Taken 6/29/2024 2127)  Promote skin healing: Protective dressings over bony prominences     Problem: Pain - Adult  Goal: Verbalizes/displays adequate comfort level or baseline comfort level  Outcome: Progressing     Problem: Safety - Adult  Goal: Free from fall injury  Outcome: Progressing     Problem: Discharge Planning  Goal: Discharge to home or other facility with appropriate resources  Outcome: Progressing     Problem: Chronic Conditions and Co-morbidities  Goal: Patient's chronic conditions and co-morbidity symptoms are monitored and maintained or improved  Outcome: Progressing     Problem: Fall/Injury  Goal: Not fall by end of shift  Outcome: Progressing  Goal: Be free from injury by end of the shift  Outcome: Progressing  Goal: Verbalize understanding of personal risk factors for fall in the hospital  Outcome: Progressing  Goal: Verbalize understanding of  risk factor reduction measures to prevent injury from fall in the home  Outcome: Progressing  Goal: Use assistive devices by end of the shift  Outcome: Progressing  Goal: Pace activities to prevent fatigue by end of the shift  Outcome: Progressing       The clinical goals for the shift include pt will maintain SBP between 150-160 through shift

## 2024-07-01 ENCOUNTER — PHARMACY VISIT (OUTPATIENT)
Dept: PHARMACY | Facility: CLINIC | Age: 54
End: 2024-07-01
Payer: MEDICARE

## 2024-07-01 ENCOUNTER — APPOINTMENT (OUTPATIENT)
Dept: DIALYSIS | Facility: HOSPITAL | Age: 54
End: 2024-07-01
Payer: COMMERCIAL

## 2024-07-01 VITALS
RESPIRATION RATE: 16 BRPM | SYSTOLIC BLOOD PRESSURE: 171 MMHG | HEART RATE: 72 BPM | HEIGHT: 55 IN | WEIGHT: 128.53 LBS | TEMPERATURE: 97.5 F | BODY MASS INDEX: 29.74 KG/M2 | OXYGEN SATURATION: 98 % | DIASTOLIC BLOOD PRESSURE: 72 MMHG

## 2024-07-01 LAB
ALBUMIN SERPL BCP-MCNC: 3.5 G/DL (ref 3.4–5)
ANION GAP SERPL CALC-SCNC: 19 MMOL/L (ref 10–20)
ATRIAL RATE: 78 BPM
BASOPHILS # BLD AUTO: 0.08 X10*3/UL (ref 0–0.1)
BASOPHILS NFR BLD AUTO: 1.7 %
BUN SERPL-MCNC: 37 MG/DL (ref 6–23)
CALCIUM SERPL-MCNC: 8.9 MG/DL (ref 8.6–10.6)
CHLORIDE SERPL-SCNC: 94 MMOL/L (ref 98–107)
CO2 SERPL-SCNC: 24 MMOL/L (ref 21–32)
CREAT SERPL-MCNC: 6.17 MG/DL (ref 0.5–1.05)
EGFRCR SERPLBLD CKD-EPI 2021: 8 ML/MIN/1.73M*2
EOSINOPHIL # BLD AUTO: 0.54 X10*3/UL (ref 0–0.7)
EOSINOPHIL NFR BLD AUTO: 11.3 %
ERYTHROCYTE [DISTWIDTH] IN BLOOD BY AUTOMATED COUNT: 17 % (ref 11.5–14.5)
GLUCOSE SERPL-MCNC: 199 MG/DL (ref 74–99)
HCT VFR BLD AUTO: 34.6 % (ref 36–46)
HGB BLD-MCNC: 10.1 G/DL (ref 12–16)
IMM GRANULOCYTES # BLD AUTO: 0.05 X10*3/UL (ref 0–0.7)
IMM GRANULOCYTES NFR BLD AUTO: 1 % (ref 0–0.9)
LYMPHOCYTES # BLD AUTO: 0.94 X10*3/UL (ref 1.2–4.8)
LYMPHOCYTES NFR BLD AUTO: 19.6 %
MAGNESIUM SERPL-MCNC: 2.24 MG/DL (ref 1.6–2.4)
MCH RBC QN AUTO: 27.4 PG (ref 26–34)
MCHC RBC AUTO-ENTMCNC: 29.2 G/DL (ref 32–36)
MCV RBC AUTO: 94 FL (ref 80–100)
MONOCYTES # BLD AUTO: 0.38 X10*3/UL (ref 0.1–1)
MONOCYTES NFR BLD AUTO: 7.9 %
NEUTROPHILS # BLD AUTO: 2.8 X10*3/UL (ref 1.2–7.7)
NEUTROPHILS NFR BLD AUTO: 58.5 %
NRBC BLD-RTO: 0 /100 WBCS (ref 0–0)
P AXIS: 37 DEGREES
P OFFSET: 190 MS
P ONSET: 144 MS
PHOSPHATE SERPL-MCNC: 4.6 MG/DL (ref 2.5–4.9)
PLATELET # BLD AUTO: 288 X10*3/UL (ref 150–450)
POTASSIUM SERPL-SCNC: 4.7 MMOL/L (ref 3.5–5.3)
PR INTERVAL: 144 MS
Q ONSET: 216 MS
QRS COUNT: 12 BEATS
QRS DURATION: 82 MS
QT INTERVAL: 398 MS
QTC CALCULATION(BAZETT): 453 MS
QTC FREDERICIA: 434 MS
R AXIS: 14 DEGREES
RBC # BLD AUTO: 3.69 X10*6/UL (ref 4–5.2)
SODIUM SERPL-SCNC: 132 MMOL/L (ref 136–145)
T AXIS: 169 DEGREES
T OFFSET: 415 MS
VENTRICULAR RATE: 78 BPM
WBC # BLD AUTO: 4.8 X10*3/UL (ref 4.4–11.3)

## 2024-07-01 PROCEDURE — RXMED WILLOW AMBULATORY MEDICATION CHARGE

## 2024-07-01 PROCEDURE — 2500000001 HC RX 250 WO HCPCS SELF ADMINISTERED DRUGS (ALT 637 FOR MEDICARE OP): Performed by: STUDENT IN AN ORGANIZED HEALTH CARE EDUCATION/TRAINING PROGRAM

## 2024-07-01 PROCEDURE — 2500000002 HC RX 250 W HCPCS SELF ADMINISTERED DRUGS (ALT 637 FOR MEDICARE OP, ALT 636 FOR OP/ED): Performed by: STUDENT IN AN ORGANIZED HEALTH CARE EDUCATION/TRAINING PROGRAM

## 2024-07-01 PROCEDURE — 85025 COMPLETE CBC W/AUTO DIFF WBC: CPT | Performed by: STUDENT IN AN ORGANIZED HEALTH CARE EDUCATION/TRAINING PROGRAM

## 2024-07-01 PROCEDURE — 84100 ASSAY OF PHOSPHORUS: CPT | Performed by: STUDENT IN AN ORGANIZED HEALTH CARE EDUCATION/TRAINING PROGRAM

## 2024-07-01 PROCEDURE — 8010000001 HC DIALYSIS - HEMODIALYSIS PER DAY

## 2024-07-01 PROCEDURE — 83735 ASSAY OF MAGNESIUM: CPT | Performed by: STUDENT IN AN ORGANIZED HEALTH CARE EDUCATION/TRAINING PROGRAM

## 2024-07-01 PROCEDURE — 2500000001 HC RX 250 WO HCPCS SELF ADMINISTERED DRUGS (ALT 637 FOR MEDICARE OP)

## 2024-07-01 PROCEDURE — 99239 HOSP IP/OBS DSCHRG MGMT >30: CPT | Performed by: STUDENT IN AN ORGANIZED HEALTH CARE EDUCATION/TRAINING PROGRAM

## 2024-07-01 PROCEDURE — 36415 COLL VENOUS BLD VENIPUNCTURE: CPT | Performed by: STUDENT IN AN ORGANIZED HEALTH CARE EDUCATION/TRAINING PROGRAM

## 2024-07-01 RX ORDER — TORSEMIDE 20 MG/1
TABLET ORAL
Qty: 60 TABLET | Refills: 0 | Status: SHIPPED | OUTPATIENT
Start: 2024-07-03

## 2024-07-01 RX ORDER — NIFEDIPINE 60 MG/1
60 TABLET, FILM COATED, EXTENDED RELEASE ORAL
Qty: 30 TABLET | Refills: 0 | Status: SHIPPED | OUTPATIENT
Start: 2024-07-02 | End: 2024-08-01

## 2024-07-01 RX ADMIN — ACETAMINOPHEN 975 MG: 325 TABLET ORAL at 09:21

## 2024-07-01 RX ADMIN — ASPIRIN 81 MG: 81 TABLET, COATED ORAL at 09:21

## 2024-07-01 RX ADMIN — PANTOPRAZOLE SODIUM 40 MG: 40 TABLET, DELAYED RELEASE ORAL at 09:21

## 2024-07-01 RX ADMIN — CARVEDILOL 50 MG: 25 TABLET, FILM COATED ORAL at 09:21

## 2024-07-01 RX ADMIN — APIXABAN 5 MG: 5 TABLET, FILM COATED ORAL at 09:21

## 2024-07-01 RX ADMIN — TORSEMIDE 40 MG: 20 TABLET ORAL at 09:21

## 2024-07-01 RX ADMIN — NIFEDIPINE 60 MG: 60 TABLET, FILM COATED, EXTENDED RELEASE ORAL at 05:23

## 2024-07-01 ASSESSMENT — PAIN - FUNCTIONAL ASSESSMENT: PAIN_FUNCTIONAL_ASSESSMENT: NO/DENIES PAIN

## 2024-07-01 ASSESSMENT — PAIN SCALES - GENERAL: PAINLEVEL_OUTOF10: 0 - NO PAIN

## 2024-07-01 NOTE — NURSING NOTE
Report to Receiving RN:    Report To: Pamella ISAAC   Time Report Called: 0474  Hand-Off Communication: iUF tolerated fair, ended 30min early due to hypotension and cramping. Removed 900 mL. Post /74 HR 70   Complications During Treatment: see above  Ultrafiltration Treatment: Yes  Medications Administered During Dialysis: No  Blood Products Administered During Dialysis: No  Labs Sent During Dialysis: No  Heparin Drip Rate Changes: N/A  Dialysis Catheter Dressing: N/A  Last Dressing Change: N/A

## 2024-07-01 NOTE — PROGRESS NOTES
Physical Therapy                 Therapy Communication Note    Patient Name: Stacey Heath  MRN: [id number]  Today's Date: 7/1/2024     Discipline: Physical Therapy    Missed Visit Reason: Missed Visit Reason:  (Pt off the floor at HD)    Missed Time: Attempt

## 2024-07-01 NOTE — NURSING NOTE
.Report from Sending RN:    Report From: PONCHO Solomon  Recent Surgery of Procedure: No  Baseline Level of Consciousness (LOC): A&O X3  Oxygen Use: No  Type: Room air  Diabetic: No  Last BP Med Given Day of Dialysis: coreg  Last Pain Med Given: none  Lab Tests to be Obtained with Dialysis: No  Blood Transfusion to be Given During Dialysis: Yes  Available IV Access: no  Medications to be Administered During Dialysis: No  Continuous IV Infusion Running: No  Restraints on Currently or in the Last 24 Hours: No  Hand-Off Communication: Pt had no acute event overnight, vital sign stable. Not on precaution, morning medication given.  Dialysis Catheter Dressing: AVF  Last Dressing Change: N/A

## 2024-07-01 NOTE — NURSING NOTE
7/1/2024 9417 Discharge Note  Patient discharged home with no needs. Discharge instructions discussed with patient, verbalized understanding. Aware of follow up appointments. Meds to beds delivered to bedside. No peripheral IV in place at discharge to remove. Belongings gathered per patient. Refused transport and ambulated off unit at 1640. ---- Jennifer Medrano RN

## 2024-07-01 NOTE — DISCHARGE SUMMARY
Hospitalist Discharge Summary with Progress Note       NAME: Stacey Heath  : 1970  MRN:  [id number]    ADMIT DATE: 2024  DISCHARGE DATE:  2024    PRIMARYCARE PHYSICIAN:  No primary care provider on file.    VISIT STATUS: Admission    CODE STATUS:  Full Code    HOSPITAL COURSE, DISCHARGE DIAGNOSES, AND PLAN:  This is a 54 y.o. female PMH ESRD 2/2 HTN and diabetes on dialysis T/Th/Sat via RUE AVF (last complete session 24) and poorly controlled hypertension, DMT2, COPD, brachial DVT on Eliquis (2024) that presented with shortness of breath and was admitted to MICU for hypertensive emergency and pulmonary edema. Required BiPAP and nitroglycerine infusion. Nitro ggt changed to nicardipine ggt d/t headache on nitro. Eventually weaned off nicardipine by altering home meds (changes below). Some c/f rebound HTN 2/2 clonidine withdrawal. Though clonidine was not on med list from [facility] 2024 DC summary, pt thinks she is taking at home. Clonidine resumed on this admission but BP dropped significantly therefore stopped. Note that sac-lester seem to have been started for resistant HTN on prior admissions, normal EF on recent TTE - therefore Entresto also stopped. Patient underwent extra session of dialysis and deemed stable for discharge.     Test Results Pending At Discharge  Pending Labs       No current pending labs.            DAY OF DISCHARGE:    HPI:   Patient seen and examined on day of discharge. Vitals reviewed.   Discussed discharge plan, follow up appointments, new medications with patient/family - patient/family express understanding and agreement.    ROS:  All other ROS negative unless otherwise noted above in HPI.      Patient Vitals for the past 24 hrs:   BP Temp Temp src Pulse Resp SpO2 Weight   24 1331 171/72 36.4 °C (97.5 °F) Temporal 72 16 98 % --   24 1247 -- 35.4 °C (95.7 °F) Skin 70 -- -- --   24 1100 -- 36 °C (96.8 °F) Temporal 75 -- -- --   24 0600 -- -- --  -- -- -- 58.3 kg (128 lb 8.5 oz)   24 0523 155/70 36.3 °C (97.3 °F) Temporal 65 18 100 % --   24 142/75 36.1 °C (97 °F) Temporal 70 18 93 % --       Average, Min, and Max forlast 24 hours Vitals:  TEMPERATURE:  Temp  Av.1 °C (96.9 °F)  Min: 35.4 °C (95.7 °F)  Max: 36.4 °C (97.5 °F)    RESPIRATIONS RANGE: Resp  Av.3  Min: 16  Max: 18    PULSE RANGE: Pulse  Av.4  Min: 65  Max: 75    BLOOD PRESSURE RANGE:  Systolic (24hrs), Av , Min:142 , Max:171   ; Diastolic (24hrs), Av, Min:70, Max:75      PULSE OXIMETRYRANGE: SpO2  Av %  Min: 93 %  Max: 100 %    I/O last 3 completed shifts:  In: 58.5 (1 mL/kg) [I.V.:58.5 (1 mL/kg)]  Out: - (0 mL/kg)   Weight: 58.3 kg     Physical Exam  Vitals and nursing note reviewed.   Constitutional:       General: She is not in acute distress.     Appearance: Normal appearance. She is not ill-appearing or toxic-appearing.   HENT:      Head: Normocephalic and atraumatic.      Nose: Nose normal.      Mouth/Throat:      Mouth: Mucous membranes are moist.   Eyes:      Extraocular Movements: Extraocular movements intact.   Pulmonary:      Effort: Pulmonary effort is normal. No respiratory distress.   Abdominal:      General: There is no distension.   Musculoskeletal:         General: No swelling.      Cervical back: No rigidity.   Skin:     Coloration: Skin is not pale.   Neurological:      Mental Status: She is alert and oriented to person, place, and time. Mental status is at baseline.   Psychiatric:         Mood and Affect: Mood normal.         Behavior: Behavior normal.         PROCEDURES:  N/A    CONSULTANTS:  Nephrology  ICU    DISCHARGE MEDICATIONS:         Medication List      START taking these medications     NIFEdipine ER 60 mg 24 hr tablet; Commonly known as: Adalat CC; Take 1   tablet (60 mg) by mouth once daily in the morning. Take before meals. Do   not crush, chew, or split.; Start taking on: 2024; Replaces:   NIFEdipine ER 90 mg  24 hr tablet     CHANGE how you take these medications     carvedilol 25 mg tablet; Commonly known as: Coreg; Take 2 tablets (50   mg) by mouth 2 times a day.; What changed: how much to take   fluticasone 50 mcg/actuation nasal spray; Commonly known as: Flonase;   Administer 2 sprays into each nostril once daily. Shake gently. Before   first use, prime pump. After use, clean tip and replace cap.; What   changed: when to take this, reasons to take this   torsemide 20 mg tablet; Commonly known as: Demadex; Take 2 tablets once   daily on non-dialysis days only. Do not take on dialysis days. Do not   start before July 3, 2024.; Start taking on: July 3, 2024; What changed:   additional instructions, These instructions start on July 3, 2024. If you   are unsure what to do until then, ask your doctor or other care provider.     CONTINUE taking these medications     acetaminophen 325 mg tablet; Commonly known as: Tylenol; Take 2 tablets   (650 mg) by mouth every 4 hours if needed for mild pain (1 - 3) or   moderate pain (4 - 6).   * albuterol 1.25 mg/3 mL nebulizer solution; Take 3 mL (1.25 mg) by   nebulization every 6 hours if needed for wheezing.   * albuterol 90 mcg/actuation inhaler; Commonly known as: Ventolin HFA;   Inhale 2 puffs 2 times a day.   apixaban 5 mg tablet; Commonly known as: Eliquis; Take 1 tablet (5 mg)   by mouth 2 times a day.   aspirin 81 mg EC tablet; Take 1 tablet (81 mg) by mouth once daily.   atorvastatin 80 mg tablet; Commonly known as: Lipitor; Take 1 tablet (80   mg) by mouth once daily at bedtime.   Breo Ellipta 100-25 mcg/dose inhaler; Generic drug: fluticasone   furoate-vilanteroL; Inhale 1 puff once daily.   calcium acetate 667 mg capsule; Commonly known as: Phoslo; Take 2   capsules (1,334 mg) by mouth 3 times daily (morning, midday, late   afternoon).   epoetin joceline 10,000 unit/mL injection; Commonly known as:   Epogen,Procrit; Inject 1 mL (10,000 Units) under the skin 3 times a week.    gabapentin 300 mg capsule; Commonly known as: Neurontin; Take 1 capsule   (300 mg) by mouth once daily at bedtime.   hydrOXYzine HCL 10 mg tablet; Commonly known as: Atarax; Take 1 tablet   (10 mg) by mouth every 6 hours if needed for itching.   isosorbide mononitrate  mg 24 hr tablet; Commonly known as: Imdur;   Take 1 tablet (120 mg) by mouth once daily. Do not crush or chew.   lidocaine 5 % patch; Commonly known as: Lidoderm   pantoprazole 40 mg EC tablet; Commonly known as: ProtoNix; Take 1 tablet   (40 mg) by mouth once daily. Do not crush, chew, or split.   polyethylene glycol 17 gram/dose powder; Commonly known as: Glycolax,   Miralax; Take 17 g by mouth once daily as needed (constipation).  * This list has 2 medication(s) that are the same as other medications   prescribed for you. Read the directions carefully, and ask your doctor or   other care provider to review them with you.     STOP taking these medications     amLODIPine 10 mg tablet; Commonly known as: Norvasc   cloNIDine 0.2 mg tablet; Commonly known as: Catapres   Deep Sea Nasal 0.65 % nasal spray; Generic drug: sodium chloride   Ensure liquid; Generic drug: nutritional drink   Entresto  mg tablet; Generic drug: sacubitriL-valsartan   fluticasone propion-salmeteroL 100-50 mcg/dose diskus inhaler; Commonly   known as: Advair Diskus   hydrALAZINE 100 mg tablet; Commonly known as: Apresoline   melatonin 5 mg tablet   metoclopramide 5 mg tablet; Commonly known as: Reglan   NIFEdipine ER 90 mg 24 hr tablet; Commonly known as: Adalat CC; Replaced   by: NIFEdipine ER 60 mg 24 hr tablet   ondansetron 4 mg tablet; Commonly known as: Zofran   ondansetron ODT 4 mg disintegrating tablet; Commonly known as:   Zofran-ODT   SUMAtriptan 25 mg tablet; Commonly known as: Imitrex       DIET: renal    ACTIVITY: resume regular activity    DISPOSITION:  Home    FACILITY/HOME CARE AGENCY NAME: N/A    Future Appointments   Date Time Provider Department Center    7/2/2024  2:00 PM [facility] PFT [facility] RM [id number] [facility]   7/2/2024  2:30 PM [facility]FT [facility] RM [id number] [facility]   7/2/2024  3:00 PM [facility] PFT RM 1 [id number] [facility]   7/15/2024 10:45 AM Antonietta Manzo DPM [id number]   7/16/2024  3:20 PM Mago Mota, APRN-CNP [id number] [facility]   7/23/2024  1:40 PM Kacey Garzon MD [id number] [facility]   8/2/2024 10:30 AM Sherri Gastelum MD [id number] [facility]   8/14/2024  2:40 PM Marbella Rosales APRN-CNP [id number] [facility]   8/28/2024 11:20 AM Judy Alcaraz MD [id number] [facility]       DISCHARGE TIME: Total time spent on discharge ~ 41 min.    Electronically signed by Dianne Tam DO on 07/01/24 at 6:12 PM

## 2024-07-02 ENCOUNTER — APPOINTMENT (OUTPATIENT)
Dept: RESPIRATORY THERAPY | Facility: HOSPITAL | Age: 54
End: 2024-07-02
Payer: COMMERCIAL

## 2024-07-03 ENCOUNTER — PATIENT OUTREACH (OUTPATIENT)
Dept: CARE COORDINATION | Facility: CLINIC | Age: 54
End: 2024-07-03

## 2024-07-03 ENCOUNTER — DOCUMENTATION (OUTPATIENT)
Dept: BEHAVIORAL HEALTH | Facility: CLINIC | Age: 54
End: 2024-07-03
Payer: COMMERCIAL

## 2024-07-03 DIAGNOSIS — F41.9 ANXIETY: ICD-10-CM

## 2024-07-03 PROCEDURE — H2019 THER BEHAV SVC, PER 15 MIN: HCPCS | Mod: HE | Performed by: COMMUNITY/BEHAVIORAL HEALTH

## 2024-07-03 NOTE — PROGRESS NOTES
Pt is a  Personalized Care pt ref to this RD for nutrition edu/ management for HTN. Spoke to pt today, pt agreed to work with this RD for such. Scheduled appt for 7/10 at noon via zoom.

## 2024-07-03 NOTE — PROGRESS NOTES
"Stacey Heath  Age: 54 y.o.  MRN: [id number]  Date: 7/3/2024  Location of service: in community    Program Details  Medicaid Community Clinical Case Management  Status: Enrolled  Effective Dates: 10/17/2023 - present  Responsible Staff: NAREN Davidson      Goals Reviewed:  Problem: Anxiety       Goal: Attempts to manage anxiety with help       Priority: High         Goal: Verbalizes ways to manage anxiety       Priority: High         Goal: Implements measures to reduce anxiety       Priority: High        Problem: Financial Stressors       Goal: Assistance with financial concerns             Problem: Risk of Uncoordinated Care       Goal: Care will be Coordinated and Supported by a Multidisciplinary Team of Providers       Priority: High          Summary:  This provider met with patient at the patient's home. Provider listened with empathy as the patient spoke about her family. Provider assisted the patient with calling her medical insurance company to confirm the medical benefits as she received a medicaid card in the mail without an explanation. Provider also provided information regarding the \"Housing Choice Voucher\" to help with rental assistance to possibly alleviate some financial burden.     Appointment start time: 1050  Appointment completion time: 1152  Total time spent with patient (in minutes): 62      April NAREN Chavez   "

## 2024-07-08 ENCOUNTER — APPOINTMENT (OUTPATIENT)
Dept: RADIOLOGY | Facility: HOSPITAL | Age: 54
End: 2024-07-08
Payer: COMMERCIAL

## 2024-07-08 ENCOUNTER — CLINICAL SUPPORT (OUTPATIENT)
Dept: EMERGENCY MEDICINE | Facility: HOSPITAL | Age: 54
End: 2024-07-08
Payer: COMMERCIAL

## 2024-07-08 ENCOUNTER — APPOINTMENT (OUTPATIENT)
Dept: DIALYSIS | Facility: HOSPITAL | Age: 54
End: 2024-07-08
Payer: COMMERCIAL

## 2024-07-08 ENCOUNTER — HOSPITAL ENCOUNTER (OUTPATIENT)
Facility: HOSPITAL | Age: 54
Setting detail: OBSERVATION
Discharge: HOME | DRG: 291 | End: 2024-07-08
Attending: EMERGENCY MEDICINE | Admitting: NURSE PRACTITIONER
Payer: COMMERCIAL

## 2024-07-08 DIAGNOSIS — I16.0 HYPERTENSIVE URGENCY: ICD-10-CM

## 2024-07-08 DIAGNOSIS — R19.7 DIARRHEA, UNSPECIFIED TYPE: ICD-10-CM

## 2024-07-08 DIAGNOSIS — I50.31 ACUTE DIASTOLIC CHF (CONGESTIVE HEART FAILURE) (MULTI): ICD-10-CM

## 2024-07-08 DIAGNOSIS — E87.70 HYPERVOLEMIA, UNSPECIFIED HYPERVOLEMIA TYPE: Primary | ICD-10-CM

## 2024-07-08 PROBLEM — J96.90 RESPIRATORY FAILURE (MULTI): Status: RESOLVED | Noted: 2023-08-23 | Resolved: 2024-07-08

## 2024-07-08 PROBLEM — I34.0 NONRHEUMATIC MITRAL VALVE REGURGITATION: Status: ACTIVE | Noted: 2024-06-15

## 2024-07-08 PROBLEM — M25.512 SHOULDER PAIN, BILATERAL: Status: RESOLVED | Noted: 2023-02-25 | Resolved: 2024-07-08

## 2024-07-08 PROBLEM — I82.722: Status: ACTIVE | Noted: 2024-06-15

## 2024-07-08 PROBLEM — Q12.0 CONGENITAL CATARACT: Status: RESOLVED | Noted: 2023-02-25 | Resolved: 2024-07-08

## 2024-07-08 PROBLEM — R06.09 DYSPNEA ON EXERTION: Status: RESOLVED | Noted: 2021-08-03 | Resolved: 2024-07-08

## 2024-07-08 PROBLEM — E66.9 OBESITY, CLASS I, BMI 30-34.9: Status: RESOLVED | Noted: 2022-11-11 | Resolved: 2024-07-08

## 2024-07-08 PROBLEM — M25.511 SHOULDER PAIN, BILATERAL: Status: RESOLVED | Noted: 2023-02-25 | Resolved: 2024-07-08

## 2024-07-08 PROBLEM — B95.62 BACTEREMIA DUE TO METHICILLIN RESISTANT STAPHYLOCOCCUS AUREUS: Status: RESOLVED | Noted: 2024-07-08 | Resolved: 2024-07-08

## 2024-07-08 PROBLEM — E66.811 OBESITY, CLASS I, BMI 30-34.9: Status: RESOLVED | Noted: 2022-11-11 | Resolved: 2024-07-08

## 2024-07-08 PROBLEM — Z99.2 DIALYSIS PATIENT (CMS-HCC): Status: RESOLVED | Noted: 2023-02-25 | Resolved: 2024-07-08

## 2024-07-08 PROBLEM — L02.419 ABSCESS OF AXILLARY REGION: Status: RESOLVED | Noted: 2023-02-25 | Resolved: 2024-07-08

## 2024-07-08 PROBLEM — R78.81 BACTEREMIA DUE TO METHICILLIN RESISTANT STAPHYLOCOCCUS AUREUS: Status: RESOLVED | Noted: 2024-07-08 | Resolved: 2024-07-08

## 2024-07-08 PROBLEM — J81.0 FLASH PULMONARY EDEMA (MULTI): Status: RESOLVED | Noted: 2024-06-28 | Resolved: 2024-07-08

## 2024-07-08 PROBLEM — R05.9 COUGH, UNSPECIFIED: Status: RESOLVED | Noted: 2023-02-11 | Resolved: 2024-07-08

## 2024-07-08 PROBLEM — R87.612 LOW GRADE SQUAMOUS INTRAEPITHELIAL LESION ON CYTOLOGIC SMEAR OF CERVIX (LGSIL): Status: RESOLVED | Noted: 2023-02-25 | Resolved: 2024-07-08

## 2024-07-08 PROBLEM — T82.590A DIALYSIS AV FISTULA MALFUNCTION (CMS-HCC): Status: RESOLVED | Noted: 2023-02-25 | Resolved: 2024-07-08

## 2024-07-08 PROBLEM — I16.1 HYPERTENSIVE EMERGENCY: Status: RESOLVED | Noted: 2024-05-28 | Resolved: 2024-07-08

## 2024-07-08 PROBLEM — I10 POORLY-CONTROLLED HYPERTENSION: Status: RESOLVED | Noted: 2024-01-20 | Resolved: 2024-07-08

## 2024-07-08 LAB
ALBUMIN SERPL BCP-MCNC: 4.5 G/DL (ref 3.4–5)
ALP SERPL-CCNC: 127 U/L (ref 33–110)
ALT SERPL W P-5'-P-CCNC: 15 U/L (ref 7–45)
ANION GAP BLDV CALCULATED.4IONS-SCNC: 15 MMOL/L (ref 10–25)
ANION GAP SERPL CALC-SCNC: 22 MMOL/L (ref 10–20)
AST SERPL W P-5'-P-CCNC: 18 U/L (ref 9–39)
ATRIAL RATE: 101 BPM
BASE EXCESS BLDV CALC-SCNC: 4.8 MMOL/L (ref -2–3)
BASOPHILS # BLD AUTO: 0.06 X10*3/UL (ref 0–0.1)
BASOPHILS NFR BLD AUTO: 0.9 %
BILIRUB SERPL-MCNC: 0.6 MG/DL (ref 0–1.2)
BNP SERPL-MCNC: >5000 PG/ML (ref 0–99)
BODY TEMPERATURE: 37 DEGREES CELSIUS
BUN SERPL-MCNC: 54 MG/DL (ref 6–23)
CA-I BLDV-SCNC: 1.14 MMOL/L (ref 1.1–1.33)
CALCIUM SERPL-MCNC: 9.4 MG/DL (ref 8.6–10.6)
CARDIAC TROPONIN I PNL SERPL HS: 102 NG/L (ref 0–34)
CHLORIDE BLDV-SCNC: 99 MMOL/L (ref 98–107)
CHLORIDE SERPL-SCNC: 96 MMOL/L (ref 98–107)
CO2 SERPL-SCNC: 28 MMOL/L (ref 21–32)
CREAT SERPL-MCNC: 8.31 MG/DL (ref 0.5–1.05)
EGFRCR SERPLBLD CKD-EPI 2021: 5 ML/MIN/1.73M*2
EOSINOPHIL # BLD AUTO: 0.24 X10*3/UL (ref 0–0.7)
EOSINOPHIL NFR BLD AUTO: 3.5 %
ERYTHROCYTE [DISTWIDTH] IN BLOOD BY AUTOMATED COUNT: 18.8 % (ref 11.5–14.5)
FLUAV RNA RESP QL NAA+PROBE: NOT DETECTED
FLUBV RNA RESP QL NAA+PROBE: NOT DETECTED
GLUCOSE BLDV-MCNC: 87 MG/DL (ref 74–99)
GLUCOSE SERPL-MCNC: 75 MG/DL (ref 74–99)
HCO3 BLDV-SCNC: 30.4 MMOL/L (ref 22–26)
HCT VFR BLD AUTO: 31.2 % (ref 36–46)
HCT VFR BLD EST: 31 % (ref 36–46)
HGB BLD-MCNC: 9.7 G/DL (ref 12–16)
HGB BLDV-MCNC: 10.2 G/DL (ref 12–16)
IMM GRANULOCYTES # BLD AUTO: 0.05 X10*3/UL (ref 0–0.7)
IMM GRANULOCYTES NFR BLD AUTO: 0.7 % (ref 0–0.9)
INHALED O2 CONCENTRATION: 28 %
INR PPP: 1.6 (ref 0.9–1.1)
LACTATE BLDV-SCNC: 1.2 MMOL/L (ref 0.4–2)
LYMPHOCYTES # BLD AUTO: 0.85 X10*3/UL (ref 1.2–4.8)
LYMPHOCYTES NFR BLD AUTO: 12.5 %
MAGNESIUM SERPL-MCNC: 2.19 MG/DL (ref 1.6–2.4)
MCH RBC QN AUTO: 27.4 PG (ref 26–34)
MCHC RBC AUTO-ENTMCNC: 31.1 G/DL (ref 32–36)
MCV RBC AUTO: 88 FL (ref 80–100)
MONOCYTES # BLD AUTO: 0.56 X10*3/UL (ref 0.1–1)
MONOCYTES NFR BLD AUTO: 8.2 %
NEUTROPHILS # BLD AUTO: 5.04 X10*3/UL (ref 1.2–7.7)
NEUTROPHILS NFR BLD AUTO: 74.2 %
NRBC BLD-RTO: 0 /100 WBCS (ref 0–0)
OXYHGB MFR BLDV: 36.7 % (ref 45–75)
P AXIS: 45 DEGREES
P OFFSET: 197 MS
P ONSET: 146 MS
PCO2 BLDV: 49 MM HG (ref 41–51)
PH BLDV: 7.4 PH (ref 7.33–7.43)
PHOSPHATE SERPL-MCNC: 4.7 MG/DL (ref 2.5–4.9)
PLATELET # BLD AUTO: 184 X10*3/UL (ref 150–450)
PO2 BLDV: 35 MM HG (ref 35–45)
POTASSIUM BLDV-SCNC: 4.8 MMOL/L (ref 3.5–5.3)
POTASSIUM SERPL-SCNC: 4.5 MMOL/L (ref 3.5–5.3)
PR INTERVAL: 148 MS
PROT SERPL-MCNC: 8 G/DL (ref 6.4–8.2)
PROTHROMBIN TIME: 18.2 SECONDS (ref 9.8–12.8)
Q ONSET: 220 MS
QRS COUNT: 17 BEATS
QRS DURATION: 80 MS
QT INTERVAL: 334 MS
QTC CALCULATION(BAZETT): 433 MS
QTC FREDERICIA: 397 MS
R AXIS: 22 DEGREES
RBC # BLD AUTO: 3.54 X10*6/UL (ref 4–5.2)
SAO2 % BLDV: 37 % (ref 45–75)
SARS-COV-2 RNA RESP QL NAA+PROBE: DETECTED
SODIUM BLDV-SCNC: 140 MMOL/L (ref 136–145)
SODIUM SERPL-SCNC: 141 MMOL/L (ref 136–145)
T AXIS: 145 DEGREES
T OFFSET: 387 MS
VENTRICULAR RATE: 101 BPM
WBC # BLD AUTO: 6.8 X10*3/UL (ref 4.4–11.3)

## 2024-07-08 PROCEDURE — 84132 ASSAY OF SERUM POTASSIUM: CPT | Performed by: EMERGENCY MEDICINE

## 2024-07-08 PROCEDURE — 5A1D70Z PERFORMANCE OF URINARY FILTRATION, INTERMITTENT, LESS THAN 6 HOURS PER DAY: ICD-10-PCS | Performed by: EMERGENCY MEDICINE

## 2024-07-08 PROCEDURE — 83735 ASSAY OF MAGNESIUM: CPT | Performed by: EMERGENCY MEDICINE

## 2024-07-08 PROCEDURE — 36415 COLL VENOUS BLD VENIPUNCTURE: CPT | Performed by: EMERGENCY MEDICINE

## 2024-07-08 PROCEDURE — 2500000001 HC RX 250 WO HCPCS SELF ADMINISTERED DRUGS (ALT 637 FOR MEDICARE OP): Performed by: NURSE PRACTITIONER

## 2024-07-08 PROCEDURE — 99285 EMERGENCY DEPT VISIT HI MDM: CPT | Performed by: EMERGENCY MEDICINE

## 2024-07-08 PROCEDURE — 93010 ELECTROCARDIOGRAM REPORT: CPT | Performed by: EMERGENCY MEDICINE

## 2024-07-08 PROCEDURE — G0378 HOSPITAL OBSERVATION PER HR: HCPCS

## 2024-07-08 PROCEDURE — 84484 ASSAY OF TROPONIN QUANT: CPT | Performed by: EMERGENCY MEDICINE

## 2024-07-08 PROCEDURE — 83880 ASSAY OF NATRIURETIC PEPTIDE: CPT | Performed by: EMERGENCY MEDICINE

## 2024-07-08 PROCEDURE — 84100 ASSAY OF PHOSPHORUS: CPT | Performed by: EMERGENCY MEDICINE

## 2024-07-08 PROCEDURE — 93005 ELECTROCARDIOGRAM TRACING: CPT

## 2024-07-08 PROCEDURE — 2500000004 HC RX 250 GENERAL PHARMACY W/ HCPCS (ALT 636 FOR OP/ED)

## 2024-07-08 PROCEDURE — 71045 X-RAY EXAM CHEST 1 VIEW: CPT

## 2024-07-08 PROCEDURE — 87636 SARSCOV2 & INF A&B AMP PRB: CPT

## 2024-07-08 PROCEDURE — 85025 COMPLETE CBC W/AUTO DIFF WBC: CPT | Performed by: EMERGENCY MEDICINE

## 2024-07-08 PROCEDURE — 85610 PROTHROMBIN TIME: CPT | Performed by: EMERGENCY MEDICINE

## 2024-07-08 PROCEDURE — 2500000002 HC RX 250 W HCPCS SELF ADMINISTERED DRUGS (ALT 637 FOR MEDICARE OP, ALT 636 FOR OP/ED)

## 2024-07-08 PROCEDURE — 90937 HEMODIALYSIS REPEATED EVAL: CPT

## 2024-07-08 PROCEDURE — 94640 AIRWAY INHALATION TREATMENT: CPT

## 2024-07-08 PROCEDURE — 71045 X-RAY EXAM CHEST 1 VIEW: CPT | Mod: FOREIGN READ | Performed by: RADIOLOGY

## 2024-07-08 PROCEDURE — 99285 EMERGENCY DEPT VISIT HI MDM: CPT | Mod: 25

## 2024-07-08 PROCEDURE — 99223 1ST HOSP IP/OBS HIGH 75: CPT | Performed by: NURSE PRACTITIONER

## 2024-07-08 PROCEDURE — 90935 HEMODIALYSIS ONE EVALUATION: CPT | Performed by: INTERNAL MEDICINE

## 2024-07-08 RX ORDER — FLUTICASONE FUROATE AND VILANTEROL 100; 25 UG/1; UG/1
1 POWDER RESPIRATORY (INHALATION)
Status: DISCONTINUED | OUTPATIENT
Start: 2024-07-08 | End: 2024-07-18 | Stop reason: HOSPADM

## 2024-07-08 RX ORDER — CARVEDILOL 25 MG/1
25 TABLET ORAL 2 TIMES DAILY
Status: DISCONTINUED | OUTPATIENT
Start: 2024-07-08 | End: 2024-07-10

## 2024-07-08 RX ORDER — ONDANSETRON HYDROCHLORIDE 2 MG/ML
INJECTION, SOLUTION INTRAVENOUS
Status: COMPLETED
Start: 2024-07-08 | End: 2024-07-08

## 2024-07-08 RX ORDER — IPRATROPIUM BROMIDE AND ALBUTEROL SULFATE 2.5; .5 MG/3ML; MG/3ML
3 SOLUTION RESPIRATORY (INHALATION)
Status: DISCONTINUED | OUTPATIENT
Start: 2024-07-08 | End: 2024-07-08

## 2024-07-08 RX ORDER — ONDANSETRON HYDROCHLORIDE 2 MG/ML
4 INJECTION, SOLUTION INTRAVENOUS ONCE
Status: COMPLETED | OUTPATIENT
Start: 2024-07-08 | End: 2024-07-08

## 2024-07-08 RX ORDER — ATORVASTATIN CALCIUM 80 MG/1
80 TABLET, FILM COATED ORAL NIGHTLY
Status: DISCONTINUED | OUTPATIENT
Start: 2024-07-08 | End: 2024-07-18 | Stop reason: HOSPADM

## 2024-07-08 RX ORDER — IPRATROPIUM BROMIDE AND ALBUTEROL SULFATE 2.5; .5 MG/3ML; MG/3ML
3 SOLUTION RESPIRATORY (INHALATION) EVERY 20 MIN
Status: COMPLETED | OUTPATIENT
Start: 2024-07-08 | End: 2024-07-08

## 2024-07-08 RX ORDER — POLYETHYLENE GLYCOL 3350 17 G/17G
17 POWDER, FOR SOLUTION ORAL DAILY PRN
Status: DISCONTINUED | OUTPATIENT
Start: 2024-07-08 | End: 2024-07-18 | Stop reason: HOSPADM

## 2024-07-08 RX ORDER — FUROSEMIDE 10 MG/ML
40 INJECTION INTRAMUSCULAR; INTRAVENOUS ONCE
Status: COMPLETED | OUTPATIENT
Start: 2024-07-08 | End: 2024-07-08

## 2024-07-08 RX ORDER — FLUTICASONE PROPIONATE 50 MCG
2 SPRAY, SUSPENSION (ML) NASAL DAILY
Status: DISCONTINUED | OUTPATIENT
Start: 2024-07-08 | End: 2024-07-18 | Stop reason: HOSPADM

## 2024-07-08 RX ORDER — AMOXICILLIN 250 MG
2 CAPSULE ORAL 2 TIMES DAILY
Status: DISCONTINUED | OUTPATIENT
Start: 2024-07-08 | End: 2024-07-18 | Stop reason: HOSPADM

## 2024-07-08 RX ORDER — CALCIUM ACETATE 667 MG/1
1334 CAPSULE ORAL
Status: DISCONTINUED | OUTPATIENT
Start: 2024-07-08 | End: 2024-07-18 | Stop reason: HOSPADM

## 2024-07-08 RX ORDER — ALBUTEROL SULFATE 0.83 MG/ML
2.5 SOLUTION RESPIRATORY (INHALATION) EVERY 2 HOUR PRN
Status: DISCONTINUED | OUTPATIENT
Start: 2024-07-08 | End: 2024-07-18 | Stop reason: HOSPADM

## 2024-07-08 RX ORDER — TORSEMIDE 20 MG/1
20 TABLET ORAL
Status: DISCONTINUED | OUTPATIENT
Start: 2024-07-10 | End: 2024-07-18 | Stop reason: HOSPADM

## 2024-07-08 RX ORDER — IPRATROPIUM BROMIDE AND ALBUTEROL SULFATE 2.5; .5 MG/3ML; MG/3ML
3 SOLUTION RESPIRATORY (INHALATION) 4 TIMES DAILY
Status: DISCONTINUED | OUTPATIENT
Start: 2024-07-09 | End: 2024-07-09

## 2024-07-08 RX ORDER — ACETAMINOPHEN 325 MG/1
975 TABLET ORAL EVERY 6 HOURS
Status: DISCONTINUED | OUTPATIENT
Start: 2024-07-08 | End: 2024-07-09

## 2024-07-08 ASSESSMENT — LIFESTYLE VARIABLES
EVER HAD A DRINK FIRST THING IN THE MORNING TO STEADY YOUR NERVES TO GET RID OF A HANGOVER: NO
TOTAL SCORE: 0
HAVE PEOPLE ANNOYED YOU BY CRITICIZING YOUR DRINKING: NO
EVER FELT BAD OR GUILTY ABOUT YOUR DRINKING: NO
HAVE YOU EVER FELT YOU SHOULD CUT DOWN ON YOUR DRINKING: NO

## 2024-07-08 ASSESSMENT — COLUMBIA-SUICIDE SEVERITY RATING SCALE - C-SSRS
5. HAVE YOU STARTED TO WORK OUT OR WORKED OUT THE DETAILS OF HOW TO KILL YOURSELF? DO YOU INTEND TO CARRY OUT THIS PLAN?: NO
2. HAVE YOU ACTUALLY HAD ANY THOUGHTS OF KILLING YOURSELF?: NO
6. HAVE YOU EVER DONE ANYTHING, STARTED TO DO ANYTHING, OR PREPARED TO DO ANYTHING TO END YOUR LIFE?: NO
1. IN THE PAST MONTH, HAVE YOU WISHED YOU WERE DEAD OR WISHED YOU COULD GO TO SLEEP AND NOT WAKE UP?: NO
4. HAVE YOU HAD THESE THOUGHTS AND HAD SOME INTENTION OF ACTING ON THEM?: NO

## 2024-07-08 ASSESSMENT — PAIN DESCRIPTION - LOCATION: LOCATION: HEAD

## 2024-07-08 ASSESSMENT — ENCOUNTER SYMPTOMS
MUSCULOSKELETAL NEGATIVE: 1
SHORTNESS OF BREATH: 1
NAUSEA: 1
VOMITING: 1
CONSTITUTIONAL NEGATIVE: 1
CARDIOVASCULAR NEGATIVE: 1
EYES NEGATIVE: 1
NEUROLOGICAL NEGATIVE: 1

## 2024-07-08 ASSESSMENT — PAIN SCALES - GENERAL
PAINLEVEL_OUTOF10: 0 - NO PAIN
PAINLEVEL_OUTOF10: 5 - MODERATE PAIN
PAINLEVEL_OUTOF10: 0 - NO PAIN
PAINLEVEL_OUTOF10: 8

## 2024-07-08 ASSESSMENT — PAIN DESCRIPTION - DESCRIPTORS: DESCRIPTORS: ACHING

## 2024-07-08 ASSESSMENT — PAIN - FUNCTIONAL ASSESSMENT
PAIN_FUNCTIONAL_ASSESSMENT: NO/DENIES PAIN
PAIN_FUNCTIONAL_ASSESSMENT: 0-10
PAIN_FUNCTIONAL_ASSESSMENT: 0-10

## 2024-07-08 NOTE — H&P
HPI     Ms. Heath is a 54-year-old female with PMHx:ESRD 2/2 HTN and diabetes on dialysis T/Th/Sat via [facility] (last complete session 6/27/24) and poorly controlled hypertension, DMT2, COPD, brachial DVT on Eliquis (4/14/2024) who presented to the ED with SOB, dry non productive cough, Nausea and emesis x2 and states she just could not breath.    While in ED she was found to have a BNP over 5000, vitals were stable and CXR shows congestion.  No missed dialysis. Patient to be admitted observation for Acute on Chronic HFpEF.    ROS/EXAM     Review of Systems   Constitutional: Negative.    HENT: Negative.     Eyes: Negative.    Respiratory:  Positive for shortness of breath.    Cardiovascular: Negative.    Gastrointestinal:  Positive for nausea and vomiting.   Genitourinary: Negative.    Musculoskeletal: Negative.    Skin: Negative.    Neurological: Negative.    All other systems reviewed and are negative.    Physical Exam  Vitals reviewed.   Constitutional:       Appearance: Normal appearance.   HENT:      Head: Normocephalic.      Mouth/Throat:      Mouth: Mucous membranes are dry.   Eyes:      Extraocular Movements: Extraocular movements intact.      Conjunctiva/sclera: Conjunctivae normal.      Pupils: Pupils are equal, round, and reactive to light.   Cardiovascular:      Rate and Rhythm: Normal rate and regular rhythm.      Pulses: Normal pulses.      Heart sounds: Normal heart sounds, S1 normal and S2 normal.      Comments: S3 heard on auscultation  Pulmonary:      Effort: Pulmonary effort is normal.      Breath sounds: Normal air entry. Rales present.   Abdominal:      General: Abdomen is flat. Bowel sounds are normal.      Palpations: Abdomen is soft.   Musculoskeletal:         General: Normal range of motion.      Cervical back: Full passive range of motion without pain and normal range of motion.   Skin:     General: Skin is warm and dry.   Neurological:      General: No focal deficit present.      Mental  "Status: She is alert and oriented to person, place, and time. Mental status is at baseline.   Psychiatric:         Attention and Perception: Attention normal.         Mood and Affect: Mood normal.         Speech: Speech normal.       Vitals/LABS/RESULTS     Last Recorded Vitals  Blood pressure 145/71, pulse 90, temperature 36.6 °C (97.9 °F), temperature source Temporal, resp. rate 20, height 1.397 m (4' 7\"), weight 54.4 kg (120 lb), SpO2 97%.  Intake/Output last 3 Shifts:  No intake/output data recorded.    Relevant Results  Lab Results   Component Value Date    WBC 6.8 07/08/2024    HGB 9.7 (L) 07/08/2024    HCT 31.2 (L) 07/08/2024    MCV 88 07/08/2024     07/08/2024      Lab Results   Component Value Date    GLUCOSE 75 07/08/2024    CALCIUM 9.4 07/08/2024     07/08/2024    K 4.5 07/08/2024    CO2 28 07/08/2024    CL 96 (L) 07/08/2024    BUN 54 (H) 07/08/2024    CREATININE 8.31 (H) 07/08/2024     XR chest 1 view    Result Date: 7/8/2024  STUDY: Chest Radiograph;  07/08/2024 9:56AM INDICATION: Chest pain. COMPARISON: 2/28/2024 XR chest 1 View, 1/16/2024 XR Chest 1 View ACCESSION NUMBER(S): [id number] ORDERING CLINICIAN: GUI ALBERTS TECHNIQUE:  Frontal chest was obtained at 09:55 hours. FINDINGS: Vascular mesh stent of the right axillary region is unchanged.  There is curvilinear plate atelectasis at the left lung base.  Heart is mildly enlarged.  Borderline vascular congestion.  This has improved from the prior study.  Small left effusion.  No pneumothorax.    Borderline vascular congestion. Small left effusion. Plate atelectasis at the left lung base. Signed by Kojo Stone MD    Medications     Scheduled medications  acetaminophen, 975 mg, oral, q6h  apixaban, 5 mg, oral, BID  atorvastatin, 80 mg, oral, Nightly  calcium acetate, 1,334 mg, oral, TID  carvedilol, 25 mg, oral, BID  fluticasone, 2 spray, Each Nostril, Daily  fluticasone furoate-vilanteroL, 1 puff, inhalation, " Daily  sennosides-docusate sodium, 2 tablet, oral, BID  [START ON 7/10/2024] torsemide, 20 mg, oral, Once per day on Sunday Monday Wednesday Friday      Continuous medications     PRN medications  PRN medications: polyethylene glycol    PLAN     Ms. Heath is a 54-year-old female with PMHx:ESRD 2/2 HTN and diabetes on dialysis T/Th/Sat via [facility] AV[facility] (last complete session 6/27/24) and poorly controlled hypertension, DMT2, COPD, brachial DVT on Eliquis (4/14/2024) who presented to the ED with SOB, dry non productive cough, Nausea and emesis x2 and states she just could not breath.    While in ED she was found to have a BNP over 5000, vitals were stable and CXR shows congestion.  No missed dialysis. Patient to be admitted observation for Acute on Chronic HFpEF.  Assessment/Plan:    Acute on Chronic HFpEF  ESRD  -Dialysis T/TH/SA at [facility] on [facility]  -BNP in ED over 5000  -Dialysis and nephrology consulted and appreciate recs  -Patient going for ultrafiltration tonight and will go for regular dialysis in a.m.  -Continue home torsemide every day except for dialysis days  -Daily weights and strict I/O's    T2DM  -Stable diet controlled no home meds    COPD  -Continue home Breo Ellipta  -DuoNebs every 8 hours and as needed albuterol    DVT  HLD  HTN  -Continue home apixaban 5 mg twice daily  -Continue home atorvastatin 80 mg daily  -Continue home carvedilol 25 mg twice daily      Fluids: monitor and replete as needed  Electrolytes: monitor and replete as needed  Nutrition: Regular diet   GI prophylaxis: as needed  DVT prophylaxis: SCD  Code Status: Full Code  PCP  Pharmacy    Disposition:  Admitted for above diagnoses. Plan per above. Anticipate observation stay      Margarita Meza, APRN-CNP         I spent 75 minutes in the professional and overall care on this encounter before, during and after the visit, examining the patient, reviewing labs, writing orders and documenting the note

## 2024-07-08 NOTE — PROGRESS NOTES
Renal Staff HD Note    Patient seen, examined on dialysis   Tolerating treatment without event  161/80     BP Readings from Last 3 Encounters:   07/08/24 155/81   07/01/24 171/72   05/31/24 117/67     [unfilled]  Lab Results   Component Value Date    CREATININE 8.31 (H) 07/08/2024    BUN 54 (H) 07/08/2024     07/08/2024    K 4.5 07/08/2024    CL 96 (L) 07/08/2024    CO2 28 07/08/2024     Lab Results   Component Value Date    PTH 1,415.0 (H) 05/31/2024    CALCIUM 9.4 07/08/2024    CAION 1.18 03/14/2023    PHOS 4.7 07/08/2024     @  Lab Results   Component Value Date    HGB 9.7 (L) 07/08/2024       Continue treatment per submitted orders  2L     Next HD tomorrow

## 2024-07-08 NOTE — NURSING NOTE
Report from Sending RN:    Report From: PONCHO Philippe  Recent Surgery of Procedure: No  Baseline Level of Consciousness (LOC): A/O X 4  Oxygen Use: Yes 2L last pulse ox 96%   Type: NC  Diabetic: No  Last BP Med Given Day of Dialysis: none  Last Pain Med Given: yes, Tylenol see EMAR  Lab Tests to be Obtained with Dialysis: No  Blood Transfusion to be Given During Dialysis: No  Available IV Access: Yes  Medications to be Administered During Dialysis: No  Continuous IV Infusion Running: No,    Restraints on Currently or in the Last 24 Hours: No  Hand-Off Communication: full code, contact plus isolation, ED admit for SOB, no eventful events  Dialysis Catheter Dressing: pt has a fisula   Last Dressing Change: pt has a fistula

## 2024-07-08 NOTE — CONSULTS
Reason For Consult  ESRD    History Of Present Illness  Stacey Heath is a 54 y.o. female presenting with ESRD.     Past Medical History  She has a past medical history of Bacteremia due to methicillin resistant Staphylococcus aureus (07/08/2024), Cardiac/pericardial tamponade ([facility]) (01/20/2024), CHF (congestive heart failure) (Multi), COPD (chronic obstructive pulmonary disease) (Multi), Coronary artery disease, Disorder of sweat glands (02/25/2023), Dry eye syndrome of bilateral lacrimal glands (03/07/2017), ESRD (end stage renal disease) (Multi), Essential (primary) hypertension (12/27/2022), History of acute pancreatitis (12/21/2020), Low grade squamous intraepithelial lesion (LGSIL) on cervicovaginal cytologic smear (02/25/2023), Migraines, 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 (02/25/2023), and Type 2 myocardial infarction (Multi) (01/20/2024).    Surgical History  She has a past surgical history that includes Tubal ligation (03/07/2017); Other surgical history (03/07/2017); Appendectomy (03/07/2017); Other surgical history (03/07/2017); Other surgical history (12/08/2021); Other surgical history (12/08/2021); and CT angio abdomen w and or wo IV IV contrast (4/23/2023).     Social History  She reports that she has quit smoking. Her smoking use included cigarettes. She has never used smokeless tobacco. She reports that she does not currently use alcohol. She reports that she does not currently use drugs after having used the following drugs: Cocaine and Marijuana.    Family History  Family History   Problem Relation Name Age of Onset    Other (Cerebrovascular Accident) Father      Heart failure Paternal Grandmother      Breast cancer Paternal Grandmother      Other (Primary Cervical Cancer) Paternal Grandmother      Diabetes Paternal Grandfather      Breast cancer Father's Sister      Ovarian cancer Father's Sister          Allergies  Iodine, Bioflavonoids, Codeine, Flowers, Hydromorphone, and Shellfish  "containing products    I&O 24HR    Intake/Output Summary (Last 24 hours) at 7/8/2024 1850  Last data filed at 7/8/2024 1832  Gross per 24 hour   Intake 200 ml   Output --   Net 200 ml       Vitals 24HR  Heart Rate:  []   Temperature:  [36.6 °C (97.9 °F)]   Respirations:  [14-26]   BP: (145-176)/(65-86)   Height:  [139.7 cm (4' 7\")]   Weight:  [54.4 kg (120 lb)]   Pulse Ox:  [94 %-98 %]        Assessment/Plan   ESRD TTS  Volume overload    Principal Problem:    Fluid overload      IUF today per submitted orders  TTS HD per chronic   [facility]  Nutrition consult please      Makenna Barraza MD    "

## 2024-07-08 NOTE — PROGRESS NOTES
Stacey Heath is a 54 y.o. female with past medical history of *** who is referred by *** for diarrhea. She reports a *** history of ***. Associated with ***. Worse with ***. She has tried *** with *** improvement.    EGD 10/2023 with LA Grade C reflux and candidiasis esophagitis, small hiatal hernia, erosive gastropathy with streaks of heme adherent without active bleeding.     Denies nausea, vomiting, heartburn, early satiety, and bloating. There has been no change in bowels. She has *** bowel movements per day. No rectal bleeding, hematochezia, or melena. No unintentional weight loss.     No NSAIDS.     No prior EGD or colonoscopy.    Social history: -    Family history: Denies family history of colon cancer or other GI disorders or malignancy.     Past Medical History:   Diagnosis Date    Bacteremia due to methicillin resistant Staphylococcus aureus 07/08/2024    Cardiac/pericardial tamponade ([facility]) 01/20/2024    CHF (congestive heart failure) (Multi)     COPD (chronic obstructive pulmonary disease) (Multi)     Coronary artery disease     Disorder of sweat glands 02/25/2023    Dry eye syndrome of bilateral lacrimal glands 03/07/2017    Dry eyes    ESRD (end stage renal disease) (Multi)     Essential (primary) hypertension 12/27/2022    Hypertension    History of acute pancreatitis 12/21/2020    History of acute pancreatitis    Low grade squamous intraepithelial lesion (LGSIL) on cervicovaginal cytologic smear 02/25/2023    Migraines     History of migraine    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    Type 2 myocardial infarction (Multi) 01/20/2024     Past Surgical History:   Procedure Laterality Date    APPENDECTOMY  03/07/2017    Appendectomy    CT ABDOMEN ANGIOGRAM W AND/OR WO IV CONTRAST  4/23/2023    CT ABDOMEN ANGIOGRAM W AND/OR WO IV CONTRAST [facility] CT    OTHER SURGICAL HISTORY  03/07/2017    Cystoscopy With Pyeloscopy With Removal Of Calculus    OTHER SURGICAL HISTORY  03/07/2017     Anoscopy For Polyp Removal    OTHER SURGICAL HISTORY  12/08/2021    Arteriovenous fistula creation procedure    OTHER SURGICAL HISTORY  12/08/2021    Dialysis tunneled catheter placement    TUBAL LIGATION  03/07/2017    Tubal Ligation     No current facility-administered medications for this visit.     Current Outpatient Medications   Medication Sig Dispense Refill    acetaminophen (Tylenol) 325 mg tablet Take 2 tablets (650 mg) by mouth every 4 hours if needed for mild pain (1 - 3) or moderate pain (4 - 6). 30 tablet 0    albuterol (Ventolin HFA) 90 mcg/actuation inhaler Inhale 2 puffs 2 times a day. 18 g 11    albuterol 1.25 mg/3 mL nebulizer solution Take 3 mL (1.25 mg) by nebulization every 6 hours if needed for wheezing. 90 mL 11    apixaban (Eliquis) 5 mg tablet Take 1 tablet (5 mg) by mouth 2 times a day. 120 tablet 0    aspirin 81 mg EC tablet Take 1 tablet (81 mg) by mouth once daily. 90 tablet 3    atorvastatin (Lipitor) 80 mg tablet Take 1 tablet (80 mg) by mouth once daily at bedtime. 30 tablet 0    calcium acetate (Phoslo) 667 mg capsule Take 2 capsules (1,334 mg) by mouth 3 times daily (morning, midday, late afternoon). 180 capsule 0    carvedilol (Coreg) 25 mg tablet Take 2 tablets (50 mg) by mouth 2 times a day. (Patient taking differently: Take 1 tablet (25 mg) by mouth 2 times a day.) 120 tablet 0    epoetin joceline (Epogen,Procrit) 10,000 unit/mL injection Inject 1 mL (10,000 Units) under the skin 3 times a week.      fluticasone (Flonase) 50 mcg/actuation nasal spray Administer 2 sprays into each nostril once daily. Shake gently. Before first use, prime pump. After use, clean tip and replace cap. 16 g 12    fluticasone furoate-vilanteroL (Breo Ellipta) 100-25 mcg/dose inhaler Inhale 1 puff once daily. 60 each 0    gabapentin (Neurontin) 300 mg capsule Take 1 capsule (300 mg) by mouth once daily at bedtime. 30 capsule 0    hydrOXYzine HCL (Atarax) 10 mg tablet Take 1 tablet (10 mg) by mouth every 6  hours if needed for itching. 30 tablet 1    isosorbide mononitrate ER (Imdur) 120 mg 24 hr tablet Take 1 tablet (120 mg) by mouth once daily. Do not crush or chew. 30 tablet 0    lidocaine (Lidoderm) 5 % patch Place 1 patch on the skin once daily as needed. Remove & discard patch within 12 hours or as directed by MD.      NIFEdipine ER (Adalat CC) 60 mg 24 hr tablet Take 1 tablet (60 mg) by mouth once daily in the morning. Take before meals. Do not crush, chew, or split. 30 tablet 0    pantoprazole (ProtoNix) 40 mg EC tablet Take 1 tablet (40 mg) by mouth once daily. Do not crush, chew, or split. 30 tablet 0    polyethylene glycol (Glycolax, Miralax) 17 gram/dose powder Take 17 g by mouth once daily as needed (constipation). 238 g 0    torsemide (Demadex) 20 mg tablet Take 2 tablets once daily on non-dialysis days only. Do not take on dialysis days. Do not start before July 3, 2024. 60 tablet 0     Facility-Administered Medications Ordered in Other Visits   Medication Dose Route Frequency Provider Last Rate Last Admin    acetaminophen (Tylenol) tablet 975 mg  975 mg oral q6h Margarita Meza APRN-CNP   975 mg at 07/08/24 1318    sennosides-docusate sodium (Jesika-Colace) 8.6-50 mg per tablet 2 tablet  2 tablet oral BID Margarita Meza, APRN-CNP         Allergies   Allergen Reactions    Iodine Hives, Itching and Unknown    Bioflavonoids Swelling    Codeine Itching, Hives and Unknown     Tolerates percocet   Tolerates percocet    Tolerates percocet    Flowers Itching    Hydromorphone Itching    Shellfish Containing Products Swelling     SEAFOOD       Family History   Problem Relation Name Age of Onset    Other (Cerebrovascular Accident) Father      Heart failure Paternal Grandmother      Breast cancer Paternal Grandmother      Other (Primary Cervical Cancer) Paternal Grandmother      Diabetes Paternal Grandfather      Breast cancer Father's Sister      Ovarian cancer Father's Sister         Review of Systems  Review of  Systems negative except as noted in HPI.    Objective     There were no vitals taken for this visit.     Physical Exam  Constitutional:  No acute distress. Normal appearance. Not ill-appearing.  HENT:  Head normocephalic and atraumatic. Conjunctivae normal.  Cardiovascular:  Normal rate. Regular rhythm.  Pulmonary:  Pulmonary effort normal. No respiratory distress. Breath sounds clear.  Abdominal:  Abdomen is flat and soft. There is no distension. No tenderness or guarding.  Skin: Dry.  Neurological:  Alert and oriented.  Psychiatric:  Mood and affect normal.    Assessment/Plan     54 y.o. female with history of *** who presents today for clinic visit for *** history of ***.    Recommendations  1.  2. Follow up    Electronically signed by: Mago Mota CNP on 7/8/2024 at 2:54 PM

## 2024-07-08 NOTE — ED PROVIDER NOTES
CC: Shortness of Breath     History provided by: Patient  Limitations to History: None    HPI:  Patient is a 54-year-old female with history of ESRD secondary to hypertension and diabetes on dialysis Tuesday, Thursday, Saturday via fistula, COPD, brachial DVT on Eliquis who presents as a critical activation from triage for shortness of breath.  She states she had shortness of breath that began last night.  She last went to dialysis on Saturday and is up-to-date on her dialysis sessions.  She states she is taking all her medications daily.  She states she did take her water pill earlier today.  She is coughing but denies any productive cough, fevers, chills, chest pain.  She states she feels like her face is swollen from fluid overload.  She denies any known allergen exposure, rashes.    I reviewed discharge summary from internal medicine from July 1, 2024 when patient was admitted for shortness of breath to the medical ICU requiring BiPAP and was treated for pulmonary edema and hypertensive emergency.  Patient's Entresto was stopped at that time.  Reviewed from April 2024 with normal LV systolic function, severe LVH.    External Records Reviewed:  I reviewed prior ED visits, Care Everywhere, discharge summaries and outpatient records as appropriate.   ???????????????????????????????????????????????????????????????  Triage Vitals:  T 36.6 °C (97.9 °F)    /75  RR (!) 26  O2 94 % Supplemental oxygen (2L)    Physical Exam  Constitutional:       Appearance: She is well-developed.   HENT:      Head: Normocephalic and atraumatic.      Mouth/Throat:      Mouth: Mucous membranes are moist.   Eyes:      Conjunctiva/sclera: Conjunctivae normal.   Cardiovascular:      Rate and Rhythm: Regular rhythm. Tachycardia present.   Pulmonary:      Comments: Diminished breath sounds bilaterally, no obvious wheezing or rales, on 2 L supplemental oxygen, tachypneic  Abdominal:      General: Abdomen is flat. There is no  distension.      Tenderness: There is no abdominal tenderness. There is no guarding or rebound.   Musculoskeletal:      Cervical back: Normal range of motion and neck supple.      Comments: No appreciable pitting edema of lower extremities, patient does have facial swelling, right upper extremity fistula with palpable thrill   Neurological:      General: No focal deficit present.      Mental Status: She is alert and oriented to person, place, and time. Mental status is at baseline.   Psychiatric:         Mood and Affect: Mood normal.         Behavior: Behavior normal.        ???????????????????????????????????????????????????????????????  ED Course/Treatment/Medical Decision Making  MDM:  Patient is a 53-year-old female who presents as a critical activation for shortness of breath.  Patient is tachypneic during my evaluation however able to speak in complete sentences, is on her baseline 2 L of oxygen via nasal cannula.  Differential diagnoses considered include COPD, asthma exacerbation, CHF exacerbation, fluid overload, pneumonia, pneumothorax, flash pulmonary edema, PE.  Given that patient denies any recent fevers, chills, productive cough at low suspicion for COPD exacerbation.  I did give DuoNebs for supportive care.  Initial blood gas obtained without overt derangements.  Point-of-care ultrasound done with reduced EF approximately 40% and B-lines bilaterally therefore I suspect fluid overload.  Patient takes 20 mg of torsemide at home therefore 40 mg of IV Lasix given, patient states she does make urine.  Patient has been compliant with her dialysis sessions.  EKG also has nonspecific changes, she denies chest pain but we will monitor cardiac workup.  Based off presentation my low suspicion for pulmonary embolism, she has a low risk Wells score at this time.  She is also on Eliquis.      ED Course:  ED Course as of 07/08/24 1129   Mon Jul 08, 2024   0942 Venous full panel obtained with normal pH 7.4, CO2 49,  normal lactate, overall unremarkable venous full panel [SA]   0954 Chest x-ray with interstitial edema similar to prior, no obvious consolidation [SA]   0955 EKG reviewed sinus tachycardia rate 101, KS interval 1 and 40 ms, QRS 80 ms, QTc 4 and 32 ms, nonspecific ST-T changes in lateral leads, no acute ST segment elevations or depressions [SA]   1001 Viral swabs obtained.  I considered positive pressure ventilation however patient is hemodynamically stable, respirating appropriately, mentating appropriately [SA]   1029 CBC with anemia consistent with baseline, WBC within normal limits.  Initial troponin 102 which is elevated from prior, patient denies any chest pain at this time, given EKG changes and elevated troponin I believe patient will benefit from admission for further cardiac restratification and workup [SA]   1108 Patient has elevated PT and INR from prior, no obvious bleeding or bruising on exam [SA]   1109 Metabolic panel with elevated anion gap, patient does not appear to be in DKA, elevated creatinine and BUN from prior however patient has known ESRD, uremia may be contributing to anion gap, patient is not acidotic on VBG [SA]   1109 I discussed case with admission coordinator, delta troponin pending [SA]      ED Course User Index  [SA] Karin Will, DO         Diagnoses as of 07/08/24 1129   Hypervolemia, unspecified hypervolemia type           EKG Interpretation:  See ED Course/Below:    Independent Interpretation of Studies:  I independently interpreted labs/imaging as stated in ED Course or below.    Differential diagnoses considered include but are not limited to: See MDM/Below:    Social Determinants Limiting Care:  None identified The following actions were taken to address these social determinants: None    Discussion of Management with Other Providers: See MDM/Below:    Disposition:  Admitted    LAKHWINDER Diamond, PGY-3    I reviewed the case with the attending ED physician. The  attending ED physician agrees with the plan. Patient and/or patient´s representative was counseled regarding labs, imaging, likely diagnosis, and plan. All questions were answered.    Disclaimer: This note was dictated by speech recognition.  Attempt at proofreading was made to minimize errors.  Errors in transcription may be present.  Please call if questions.    Procedures ? Smart[employer] last updated 7/8/2024 11:29 AM        Karin Will DO  Resident  07/08/24 1120

## 2024-07-09 ENCOUNTER — DOCUMENTATION (OUTPATIENT)
Dept: BEHAVIORAL HEALTH | Facility: CLINIC | Age: 54
End: 2024-07-09
Payer: COMMERCIAL

## 2024-07-09 ENCOUNTER — APPOINTMENT (OUTPATIENT)
Dept: DIALYSIS | Facility: HOSPITAL | Age: 54
End: 2024-07-09
Payer: COMMERCIAL

## 2024-07-09 LAB
ALBUMIN SERPL BCP-MCNC: 4 G/DL (ref 3.4–5)
ANION GAP SERPL CALC-SCNC: 25 MMOL/L (ref 10–20)
BUN SERPL-MCNC: 72 MG/DL (ref 6–23)
CALCIUM SERPL-MCNC: 8.3 MG/DL (ref 8.6–10.6)
CHLORIDE SERPL-SCNC: 96 MMOL/L (ref 98–107)
CO2 SERPL-SCNC: 21 MMOL/L (ref 21–32)
CREAT SERPL-MCNC: 11.36 MG/DL (ref 0.5–1.05)
EGFRCR SERPLBLD CKD-EPI 2021: 4 ML/MIN/1.73M*2
ERYTHROCYTE [DISTWIDTH] IN BLOOD BY AUTOMATED COUNT: 18.8 % (ref 11.5–14.5)
GLUCOSE SERPL-MCNC: 108 MG/DL (ref 74–99)
HCT VFR BLD AUTO: 30.4 % (ref 36–46)
HGB BLD-MCNC: 8.5 G/DL (ref 12–16)
MCH RBC QN AUTO: 28.1 PG (ref 26–34)
MCHC RBC AUTO-ENTMCNC: 28 G/DL (ref 32–36)
MCV RBC AUTO: 101 FL (ref 80–100)
NRBC BLD-RTO: 0 /100 WBCS (ref 0–0)
PHOSPHATE SERPL-MCNC: 6.4 MG/DL (ref 2.5–4.9)
PLATELET # BLD AUTO: 151 X10*3/UL (ref 150–450)
POTASSIUM SERPL-SCNC: 5.4 MMOL/L (ref 3.5–5.3)
RBC # BLD AUTO: 3.02 X10*6/UL (ref 4–5.2)
SODIUM SERPL-SCNC: 137 MMOL/L (ref 136–145)
WBC # BLD AUTO: 7.2 X10*3/UL (ref 4.4–11.3)

## 2024-07-09 PROCEDURE — G0378 HOSPITAL OBSERVATION PER HR: HCPCS

## 2024-07-09 PROCEDURE — 2500000004 HC RX 250 GENERAL PHARMACY W/ HCPCS (ALT 636 FOR OP/ED): Performed by: STUDENT IN AN ORGANIZED HEALTH CARE EDUCATION/TRAINING PROGRAM

## 2024-07-09 PROCEDURE — 2500000001 HC RX 250 WO HCPCS SELF ADMINISTERED DRUGS (ALT 637 FOR MEDICARE OP): Performed by: STUDENT IN AN ORGANIZED HEALTH CARE EDUCATION/TRAINING PROGRAM

## 2024-07-09 PROCEDURE — 2500000002 HC RX 250 W HCPCS SELF ADMINISTERED DRUGS (ALT 637 FOR MEDICARE OP, ALT 636 FOR OP/ED): Performed by: STUDENT IN AN ORGANIZED HEALTH CARE EDUCATION/TRAINING PROGRAM

## 2024-07-09 PROCEDURE — 85027 COMPLETE CBC AUTOMATED: CPT | Performed by: NURSE PRACTITIONER

## 2024-07-09 PROCEDURE — 80069 RENAL FUNCTION PANEL: CPT | Performed by: NURSE PRACTITIONER

## 2024-07-09 PROCEDURE — 8010000001 HC DIALYSIS - HEMODIALYSIS PER DAY

## 2024-07-09 PROCEDURE — 94640 AIRWAY INHALATION TREATMENT: CPT

## 2024-07-09 PROCEDURE — 2500000001 HC RX 250 WO HCPCS SELF ADMINISTERED DRUGS (ALT 637 FOR MEDICARE OP): Performed by: NURSE PRACTITIONER

## 2024-07-09 PROCEDURE — 99232 SBSQ HOSP IP/OBS MODERATE 35: CPT | Performed by: STUDENT IN AN ORGANIZED HEALTH CARE EDUCATION/TRAINING PROGRAM

## 2024-07-09 PROCEDURE — 36415 COLL VENOUS BLD VENIPUNCTURE: CPT | Performed by: NURSE PRACTITIONER

## 2024-07-09 PROCEDURE — 2500000002 HC RX 250 W HCPCS SELF ADMINISTERED DRUGS (ALT 637 FOR MEDICARE OP, ALT 636 FOR OP/ED): Performed by: NURSE PRACTITIONER

## 2024-07-09 PROCEDURE — 90937 HEMODIALYSIS REPEATED EVAL: CPT

## 2024-07-09 PROCEDURE — 90935 HEMODIALYSIS ONE EVALUATION: CPT | Performed by: INTERNAL MEDICINE

## 2024-07-09 RX ORDER — DIPHENHYDRAMINE HYDROCHLORIDE 50 MG/ML
25 INJECTION INTRAMUSCULAR; INTRAVENOUS ONCE
Status: COMPLETED | OUTPATIENT
Start: 2024-07-09 | End: 2024-07-09

## 2024-07-09 RX ORDER — ONDANSETRON HYDROCHLORIDE 2 MG/ML
4 INJECTION, SOLUTION INTRAVENOUS EVERY 4 HOURS PRN
Status: DISCONTINUED | OUTPATIENT
Start: 2024-07-09 | End: 2024-07-18 | Stop reason: HOSPADM

## 2024-07-09 RX ORDER — HYDRALAZINE HYDROCHLORIDE 25 MG/1
25 TABLET, FILM COATED ORAL 3 TIMES DAILY PRN
Status: DISCONTINUED | OUTPATIENT
Start: 2024-07-09 | End: 2024-07-11

## 2024-07-09 RX ORDER — TRAMADOL HYDROCHLORIDE 50 MG/1
50 TABLET ORAL EVERY 12 HOURS PRN
Status: DISCONTINUED | OUTPATIENT
Start: 2024-07-09 | End: 2024-07-18 | Stop reason: HOSPADM

## 2024-07-09 RX ORDER — ACETAMINOPHEN 325 MG/1
650 TABLET ORAL EVERY 6 HOURS PRN
Status: DISCONTINUED | OUTPATIENT
Start: 2024-07-09 | End: 2024-07-09

## 2024-07-09 RX ORDER — PROCHLORPERAZINE EDISYLATE 5 MG/ML
10 INJECTION INTRAMUSCULAR; INTRAVENOUS ONCE
Status: COMPLETED | OUTPATIENT
Start: 2024-07-09 | End: 2024-07-09

## 2024-07-09 RX ORDER — BENZONATATE 100 MG/1
100 CAPSULE ORAL 3 TIMES DAILY PRN
Status: DISCONTINUED | OUTPATIENT
Start: 2024-07-09 | End: 2024-07-18 | Stop reason: HOSPADM

## 2024-07-09 RX ORDER — IPRATROPIUM BROMIDE AND ALBUTEROL SULFATE 2.5; .5 MG/3ML; MG/3ML
3 SOLUTION RESPIRATORY (INHALATION)
Status: DISCONTINUED | OUTPATIENT
Start: 2024-07-09 | End: 2024-07-13

## 2024-07-09 RX ORDER — METOCLOPRAMIDE HYDROCHLORIDE 5 MG/ML
10 INJECTION INTRAMUSCULAR; INTRAVENOUS ONCE
Status: COMPLETED | OUTPATIENT
Start: 2024-07-09 | End: 2024-07-09

## 2024-07-09 SDOH — SOCIAL STABILITY: SOCIAL INSECURITY: ABUSE: ADULT

## 2024-07-09 SDOH — SOCIAL STABILITY: SOCIAL INSECURITY: ARE THERE ANY APPARENT SIGNS OF INJURIES/BEHAVIORS THAT COULD BE RELATED TO ABUSE/NEGLECT?: NO

## 2024-07-09 SDOH — SOCIAL STABILITY: SOCIAL INSECURITY: DOES ANYONE TRY TO KEEP YOU FROM HAVING/CONTACTING OTHER FRIENDS OR DOING THINGS OUTSIDE YOUR HOME?: NO

## 2024-07-09 SDOH — SOCIAL STABILITY: SOCIAL INSECURITY: DO YOU FEEL UNSAFE GOING BACK TO THE PLACE WHERE YOU ARE LIVING?: NO

## 2024-07-09 SDOH — SOCIAL STABILITY: SOCIAL INSECURITY: DO YOU FEEL ANYONE HAS EXPLOITED OR TAKEN ADVANTAGE OF YOU FINANCIALLY OR OF YOUR PERSONAL PROPERTY?: NO

## 2024-07-09 SDOH — SOCIAL STABILITY: SOCIAL INSECURITY: HAVE YOU HAD ANY THOUGHTS OF HARMING ANYONE ELSE?: NO

## 2024-07-09 SDOH — SOCIAL STABILITY: SOCIAL INSECURITY: HAVE YOU HAD THOUGHTS OF HARMING ANYONE ELSE?: NO

## 2024-07-09 SDOH — SOCIAL STABILITY: SOCIAL INSECURITY: WERE YOU ABLE TO COMPLETE ALL THE BEHAVIORAL HEALTH SCREENINGS?: YES

## 2024-07-09 SDOH — SOCIAL STABILITY: SOCIAL INSECURITY: ARE YOU OR HAVE YOU BEEN THREATENED OR ABUSED PHYSICALLY, EMOTIONALLY, OR SEXUALLY BY ANYONE?: NO

## 2024-07-09 SDOH — SOCIAL STABILITY: SOCIAL INSECURITY: HAS ANYONE EVER THREATENED TO HURT YOUR FAMILY OR YOUR PETS?: NO

## 2024-07-09 ASSESSMENT — COGNITIVE AND FUNCTIONAL STATUS - GENERAL
DAILY ACTIVITIY SCORE: 24
PATIENT BASELINE BEDBOUND: NO
DAILY ACTIVITIY SCORE: 24
DAILY ACTIVITIY SCORE: 24
MOBILITY SCORE: 24
DAILY ACTIVITIY SCORE: 24
MOBILITY SCORE: 24

## 2024-07-09 ASSESSMENT — ACTIVITIES OF DAILY LIVING (ADL)
ADEQUATE_TO_COMPLETE_ADL: YES
LACK_OF_TRANSPORTATION: NO
GROOMING: INDEPENDENT
BATHING: INDEPENDENT
HEARING - RIGHT EAR: FUNCTIONAL
JUDGMENT_ADEQUATE_SAFELY_COMPLETE_DAILY_ACTIVITIES: YES
TOILETING: INDEPENDENT
PATIENT'S MEMORY ADEQUATE TO SAFELY COMPLETE DAILY ACTIVITIES?: YES
HEARING - LEFT EAR: FUNCTIONAL
LACK_OF_TRANSPORTATION: NO
FEEDING YOURSELF: INDEPENDENT
WALKS IN HOME: INDEPENDENT
DRESSING YOURSELF: INDEPENDENT

## 2024-07-09 ASSESSMENT — LIFESTYLE VARIABLES
AUDIT-C TOTAL SCORE: 0
HOW MANY STANDARD DRINKS CONTAINING ALCOHOL DO YOU HAVE ON A TYPICAL DAY: PATIENT DOES NOT DRINK
AUDIT-C TOTAL SCORE: 0
HOW OFTEN DO YOU HAVE 6 OR MORE DRINKS ON ONE OCCASION: NEVER
HOW OFTEN DO YOU HAVE A DRINK CONTAINING ALCOHOL: NEVER
SKIP TO QUESTIONS 9-10: 1

## 2024-07-09 ASSESSMENT — PAIN - FUNCTIONAL ASSESSMENT
PAIN_FUNCTIONAL_ASSESSMENT: NO/DENIES PAIN
PAIN_FUNCTIONAL_ASSESSMENT: NO/DENIES PAIN
PAIN_FUNCTIONAL_ASSESSMENT: 0-10

## 2024-07-09 ASSESSMENT — PAIN SCALES - GENERAL
PAINLEVEL_OUTOF10: 7
PAINLEVEL_OUTOF10: 8
PAINLEVEL_OUTOF10: 0 - NO PAIN

## 2024-07-09 NOTE — PROGRESS NOTES
"Stacey Heath is a 54 y.o. female on day 0 of admission presenting with Fluid overload.           Objective     Physical Exam    Vitals reviewed.   Constitutional:       Appearance: Normal appearance.   HENT:      Head: Normocephalic.      Mouth/Throat:      Mouth: Mucous membranes are dry.   Eyes:      Extraocular Movements: Extraocular movements intact.      Conjunctiva/sclera: Conjunctivae normal.      Pupils: Pupils are equal, round, and reactive to light.   Cardiovascular:      Rate and Rhythm: Normal rate and regular rhythm.      Pulses: Normal pulses.      Heart sounds: Normal heart sounds, S1 normal and S2 normal.      Comments: S3 heard on auscultation  Pulmonary:      Effort: Pulmonary effort is normal.      Breath sounds: Normal air entry. Rales present.   Abdominal:      General: Abdomen is flat. Bowel sounds are normal.      Palpations: Abdomen is soft.   Musculoskeletal:         General: Normal range of motion.      Cervical back: Full passive range of motion without pain and normal range of motion.   Skin:     General: Skin is warm and dry.   Neurological:      General: No focal deficit present.      Mental Status: She is alert and oriented to person, place, and time. Mental status is at baseline.   Psychiatric:         Attention and Perception: Attention normal.         Mood and Affect: Mood normal.         Speech: Speech normal.     Last Recorded Vitals  Blood pressure 171/75, pulse 99, temperature 37.9 °C (100.2 °F), temperature source Temporal, resp. rate 17, height 1.397 m (4' 7\"), weight 54.4 kg (119 lb 14.9 oz), SpO2 100%.  Intake/Output last 3 Shifts:  I/O last 3 completed shifts:  In: 1200 (22.1 mL/kg) [I.V.:400 (7.4 mL/kg); Other:800]  Out: 4000 (73.5 mL/kg) [Other:4000]  Weight: 54.4 kg     Relevant Results                             Assessment/Plan   Principal Problem:    Fluid overload    Kumar is a 54-year-old female with PMHx:ESRD 2/2 HTN and diabetes on dialysis T/Th/Sat via RUE AVF " (last complete session 6/27/24) and poorly controlled hypertension, DMT2, COPD, brachial DVT on Eliquis (4/14/2024) who presented to the ED with SOB, dry non productive cough, Nausea and emesis x2 and states she just could not breath.  While in ED she was found to have a BNP over 5000, vitals were stable and CXR shows congestion.  No missed dialysis. Patient to be admitted observation for Acute on Chronic HFpEF.    - HD, Plan for volume removal.  Reassess after HD and ask nephrology how many sessions she needs, if she need additional.     Subjective     complaining of dry nose, ordered nose saline  requesting tessalon pearles for her cough and for mod-severe pain tramadol  Covid +  Pt is with recurrent admissions for the same issue. Per SW the assessment this morning, it appears that patient drives to appointments, not sure what the issue is with her missing HD . TCC met her today  and she denied needing anything on dc and said she's independent.I  SW to confirm that her HD Center can take her back as COVID+         Time > 40 min           Acute on Chronic HFpEF  ESRD  -Dialysis T/TH/SA at [facility] [address]  -BNP in ED over 5000  -Dialysis and nephrology consulted and appreciate recs  -Patient going for ultrafiltration tonight and will go for regular dialysis in a.m.  -Continue home torsemide every day except for dialysis days  -Daily weights and strict I/O's     T2DM  -Stable diet controlled no home meds     COPD  -Continue home Breo Ellipta  -DuoNebs every 8 hours and as needed albuterol     DVT  HLD  HTN  -Continue home apixaban 5 mg twice daily  -Continue home atorvastatin 80 mg daily  -Continue home carvedilol 25 mg twice daily        Fluids: monitor and replete as needed  Electrolytes: monitor and replete as needed  Nutrition: renal diet   GI prophylaxis: as needed  DVT prophylaxis: SCD  Code Status: Full Code      Disposition: home              I spent 40 minutes in the professional and overall care of this  patient.      Soniya Ruff MD

## 2024-07-09 NOTE — NURSING NOTE
2241 07/08/2024  Nursing Note/Admission Note:  Patient admitted to [place] [address], presenting from the ED this evening. Patient is a dialysis patient with a fistula to the right upper arm. Dialysis days are Tuesday, Thursday and Saturday. Last dialysis treatment was today, 07/08/2024. Patient comes from home with . Preferred pharmacy is [facility] here at [facility]. Patient belongings including clothing, earrings, cell phone,  and tablet remain at the bedside at this time per patient request. No medications brought with patient this admission. Precautions in place due to COVID-19 positive status. Patient ambulates independently. Patient expressed no concerns with discharge at this time. Patient denies any home care or home oxygen use at this time. Patient anticipated to return home with outpatient follow-up and continued dialysis outpatient. Will continue to assess discahrge needs throughout this hospitalization. Patient plan of care ongoing.     Pat SANTANAN, RN, CMSRN

## 2024-07-09 NOTE — NURSING NOTE
.Report from Sending RN:    Report From: PONCHO Vinson  Recent Surgery of Procedure: No  Baseline Level of Consciousness (LOC): A&O X3  Oxygen Use: Yes  Type: 2L NC  Diabetic: No  Last BP Med Given Day of Dialysis: Coreg  Last Pain Med Given: tramadol  Lab Tests to be Obtained with Dialysis: No  Blood Transfusion to be Given During Dialysis: Yes  Available IV Access: Yes  Medications to be Administered During Dialysis: No  Continuous IV Infusion Running: No  Restraints on Currently or in the Last 24 Hours: No  Hand-Off Communication: Pt had no acute event overnight, vital signs stable. Pt on Droplet precaution for Covid 19, all morning medication given.  Dialysis Catheter Dressing: yes  Last Dressing Change: AVF

## 2024-07-09 NOTE — NURSING NOTE
Report received from April dialysis RN, pt tolerated dialysis w/o issue. Elevated BP noted post-treatment prn medication to be given when pt arrives back on the floor.

## 2024-07-09 NOTE — PROGRESS NOTES
Stacey Heath  Age: 54 y.o.  MRN: [id number]  Date: 7/9/2024  Location of service: phone call    Program Details  Medicaid Community Clinical Case Management  Status: Enrolled  Effective Dates: 10/17/2023 - present  Responsible Staff: NAREN Davidson      Goals Reviewed:      Summary:  Patient calls this writer to inform that she is in the hospital with COVID. Patient states she does not want this writer to come visit because she does not want this writer exposed to COVID. Patient states she feels the hospital is helping her a lot right now.    Appointment start time: 1004  Appointment completion time: 1008  Total time spent with patient (in minutes): 4      Roberta Gallego RN

## 2024-07-09 NOTE — PROGRESS NOTES
Renal Staff HD Note    Patient seen, examined on dialysis   Tolerating treatment without event  O2 per /60  55.2 kg 2K     BP Readings from Last 3 Encounters:   07/09/24 171/75   07/01/24 171/72   05/31/24 117/67     [unfilled]  Lab Results   Component Value Date    CREATININE 11.36 (H) 07/09/2024    BUN 72 (H) 07/09/2024     07/09/2024    K 5.4 (H) 07/09/2024    CL 96 (L) 07/09/2024    CO2 21 07/09/2024     Lab Results   Component Value Date    PTH 1,415.0 (H) 05/31/2024    CALCIUM 8.3 (L) 07/09/2024    CAION 1.18 03/14/2023    PHOS 6.4 (H) 07/09/2024     @  Lab Results   Component Value Date    HGB 8.5 (L) 07/09/2024       Continue treatment per submitted orders  2L  Would plan for IUF tomorrow

## 2024-07-09 NOTE — NURSING NOTE
Report to Receiving RN:    Report To: Mel ISAAC  Time Report Called: [id number]  Hand-Off Communication: Pt tolerated a 1.7L fluid removal. Pt wanted to be taken off treatment with 30 minutes left. Ran 3 hrs of ordered 3.5 hr tx. AMA signed. Post bp: 176/123 p: 101  Complications During Treatment: No  Ultrafiltration Treatment: Yes  Medications Administered During Dialysis: No  Blood Products Administered During Dialysis: No  Labs Sent During Dialysis: No  Heparin Drip Rate Changes: N/A  Dialysis Catheter Dressing: fistula   Last Dressing Change: n/a      Last Updated: 4:44 PM by WALT APRIL

## 2024-07-09 NOTE — PROGRESS NOTES
07/09/24 1244   Discharge Planning   Living Arrangements Spouse/significant other;Family members  (Home with fiance and nephew.)   Support Systems Spouse/significant other;Family members   Assistance Needed None   Type of Residence Private residence   Do you have animals or pets at home? No   Who is requesting discharge planning? Patient   Home or Post Acute Services None   Patient expects to be discharged to: Home   Does the patient need discharge transport arranged? No  (Pt will call for a ride home.)   Financial Resource Strain   How hard is it for you to pay for the very basics like food, housing, medical care, and heating? Not hard   Housing Stability   In the last 12 months, was there a time when you were not able to pay the mortgage or rent on time? N   In the last 12 months, was there a time when you did not have a steady place to sleep or slept in a shelter (including now)? N   Transportation Needs   In the past 12 months, has lack of transportation kept you from medical appointments or from getting medications? no   In the past 12 months, has lack of transportation kept you from meetings, work, or from getting things needed for daily living? No     Assessment Note:  Met with pt and introduced myself as care coordinator and member of the Care Transitions team for discharge planning.   Pt feels safe at home, independent prior to admission.  Pt drives or her fiance provides transport to drs appts.  Pt's address, phone number and contact information was verified.  Pt is currently Covid positive, SW will update pt's home dialysis center.  Pt does not have any other questions/concerns at this time.     Previous Home Care: None  DME: Cane and wheeled walker (only uses when she feels weak).  Glucometer.   Pharmacy: [facility]: Denies  PCP:  Critical access hospital physician but cannot recall the name (last visit was 5/2024).  Dialysis:  [facility] on [address] in [place]. TTS at 6am.  Pt drives to [facility].    Otilia HILL,  RN-BC  Transitional Care Coordinator (TCC)  [phone]

## 2024-07-09 NOTE — NURSING NOTE
Patient calling to speak to CAMACHO NIETO [facility] and to speak to a nurse to review his US results.        Patient requesting a call back at [phone] Report to Receiving RN:    Report To: Denae ISAAC  Time Report Called: 2035 per secure chat  Hand-Off Communication: C/O B/L hand cramping and headache toward the end of treatment, 2 liters of fluid removed, VSS  Complications During Treatment: Yes, B/L hand cramping  Ultrafiltration Treatment: Yes  Medications Administered During Dialysis: No, tylenol 975 mg po  Blood Products Administered During Dialysis: No  Labs Sent During Dialysis: No  Heparin Drip Rate Changes: N/A  Dialysis Catheter Dressing: N/A  Last Dressing Change: N/A

## 2024-07-09 NOTE — CARE PLAN
The patient's goals for the shift include      The clinical goals for the shift include Pt will have no c/o SOB by the end of this shift.

## 2024-07-09 NOTE — CARE PLAN
Problem: Pain - Adult  Goal: Verbalizes/displays adequate comfort level or baseline comfort level  Outcome: Not Progressing     Problem: Safety - Adult  Goal: Free from fall injury  Outcome: Not Progressing     Problem: Discharge Planning  Goal: Discharge to home or other facility with appropriate resources  Outcome: Not Progressing     Problem: Chronic Conditions and Co-morbidities  Goal: Patient's chronic conditions and co-morbidity symptoms are monitored and maintained or improved  Outcome: Not Progressing     Problem: Pain  Goal: Takes deep breaths with improved pain control throughout the shift  Outcome: Not Progressing  Goal: Turns in bed with improved pain control throughout the shift  Outcome: Not Progressing  Goal: Walks with improved pain control throughout the shift  Outcome: Not Progressing  Goal: Performs ADL's with improved pain control throughout shift  Outcome: Not Progressing  Goal: Participates in PT with improved pain control throughout the shift  Outcome: Not Progressing  Goal: Free from opioid side effects throughout the shift  Outcome: Not Progressing  Goal: Free from acute confusion related to pain meds throughout the shift  Outcome: Not Progressing     Problem: Diabetes  Goal: Achieve decreasing blood glucose levels by end of shift  Outcome: Not Progressing  Goal: Increase stability of blood glucose readings by end of shift  Outcome: Not Progressing  Goal: Decrease in ketones present in urine by end of shift  Outcome: Not Progressing  Goal: Maintain electrolyte levels within acceptable range throughout shift  Outcome: Not Progressing  Goal: Maintain glucose levels >70mg/dl to <250mg/dl throughout shift  Outcome: Not Progressing  Goal: No changes in neurological exam by end of shift  Outcome: Not Progressing  Goal: Learn about and adhere to nutrition recommendations by end of shift  Outcome: Not Progressing  Goal: Vital signs within normal range for age by end of shift  Outcome: Not  Progressing  Goal: Increase self care and/or family involovement by end of shift  Outcome: Not Progressing  Goal: Receive DSME education by end of shift  Outcome: Not Progressing   The patient's goals for the shift include      The clinical goals for the shift include patient will remain safe and free from falls and/or injury throughout the duration of this shift.    Over the shift, the patient did not make progress toward the following goals. Barriers to progression include . Recommendations to address these barriers include .    Problem: Pain - Adult  Goal: Verbalizes/displays adequate comfort level or baseline comfort level  Outcome: Not Progressing     Problem: Safety - Adult  Goal: Free from fall injury  Outcome: Not Progressing     Problem: Discharge Planning  Goal: Discharge to home or other facility with appropriate resources  Outcome: Not Progressing     Problem: Chronic Conditions and Co-morbidities  Goal: Patient's chronic conditions and co-morbidity symptoms are monitored and maintained or improved  Outcome: Not Progressing     Problem: Pain  Goal: Takes deep breaths with improved pain control throughout the shift  Outcome: Not Progressing  Goal: Turns in bed with improved pain control throughout the shift  Outcome: Not Progressing  Goal: Walks with improved pain control throughout the shift  Outcome: Not Progressing  Goal: Performs ADL's with improved pain control throughout shift  Outcome: Not Progressing  Goal: Participates in PT with improved pain control throughout the shift  Outcome: Not Progressing  Goal: Free from opioid side effects throughout the shift  Outcome: Not Progressing  Goal: Free from acute confusion related to pain meds throughout the shift  Outcome: Not Progressing     Problem: Diabetes  Goal: Achieve decreasing blood glucose levels by end of shift  Outcome: Not Progressing  Goal: Increase stability of blood glucose readings by end of shift  Outcome: Not Progressing  Goal: Decrease  in ketones present in urine by end of shift  Outcome: Not Progressing  Goal: Maintain electrolyte levels within acceptable range throughout shift  Outcome: Not Progressing  Goal: Maintain glucose levels >70mg/dl to <250mg/dl throughout shift  Outcome: Not Progressing  Goal: No changes in neurological exam by end of shift  Outcome: Not Progressing  Goal: Learn about and adhere to nutrition recommendations by end of shift  Outcome: Not Progressing  Goal: Vital signs within normal range for age by end of shift  Outcome: Not Progressing  Goal: Increase self care and/or family involovement by end of shift  Outcome: Not Progressing  Goal: Receive DSME education by end of shift  Outcome: Not Progressing

## 2024-07-10 ENCOUNTER — PATIENT OUTREACH (OUTPATIENT)
Dept: CARE COORDINATION | Facility: CLINIC | Age: 54
End: 2024-07-10
Payer: COMMERCIAL

## 2024-07-10 ENCOUNTER — APPOINTMENT (OUTPATIENT)
Dept: RADIOLOGY | Facility: HOSPITAL | Age: 54
End: 2024-07-10
Payer: COMMERCIAL

## 2024-07-10 ENCOUNTER — APPOINTMENT (OUTPATIENT)
Dept: DIALYSIS | Facility: HOSPITAL | Age: 54
End: 2024-07-10
Payer: COMMERCIAL

## 2024-07-10 PROCEDURE — 2500000004 HC RX 250 GENERAL PHARMACY W/ HCPCS (ALT 636 FOR OP/ED): Performed by: STUDENT IN AN ORGANIZED HEALTH CARE EDUCATION/TRAINING PROGRAM

## 2024-07-10 PROCEDURE — G0378 HOSPITAL OBSERVATION PER HR: HCPCS

## 2024-07-10 PROCEDURE — 2500000001 HC RX 250 WO HCPCS SELF ADMINISTERED DRUGS (ALT 637 FOR MEDICARE OP): Performed by: NURSE PRACTITIONER

## 2024-07-10 PROCEDURE — 2500000001 HC RX 250 WO HCPCS SELF ADMINISTERED DRUGS (ALT 637 FOR MEDICARE OP): Performed by: STUDENT IN AN ORGANIZED HEALTH CARE EDUCATION/TRAINING PROGRAM

## 2024-07-10 PROCEDURE — 2500000002 HC RX 250 W HCPCS SELF ADMINISTERED DRUGS (ALT 637 FOR MEDICARE OP, ALT 636 FOR OP/ED): Performed by: STUDENT IN AN ORGANIZED HEALTH CARE EDUCATION/TRAINING PROGRAM

## 2024-07-10 PROCEDURE — 90945 DIALYSIS ONE EVALUATION: CPT | Performed by: INTERNAL MEDICINE

## 2024-07-10 PROCEDURE — 94640 AIRWAY INHALATION TREATMENT: CPT

## 2024-07-10 PROCEDURE — 99232 SBSQ HOSP IP/OBS MODERATE 35: CPT | Performed by: STUDENT IN AN ORGANIZED HEALTH CARE EDUCATION/TRAINING PROGRAM

## 2024-07-10 PROCEDURE — 8010000001 HC DIALYSIS - HEMODIALYSIS PER DAY

## 2024-07-10 PROCEDURE — 36410 VNPNXR 3YR/> PHY/QHP DX/THER: CPT

## 2024-07-10 PROCEDURE — 96372 THER/PROPH/DIAG INJ SC/IM: CPT | Performed by: STUDENT IN AN ORGANIZED HEALTH CARE EDUCATION/TRAINING PROGRAM

## 2024-07-10 RX ORDER — DIPHENHYDRAMINE HCL 25 MG
25 CAPSULE ORAL EVERY 6 HOURS PRN
Status: DISCONTINUED | OUTPATIENT
Start: 2024-07-10 | End: 2024-07-18 | Stop reason: HOSPADM

## 2024-07-10 RX ORDER — ISOSORBIDE MONONITRATE 30 MG/1
30 TABLET, EXTENDED RELEASE ORAL DAILY
Status: DISCONTINUED | OUTPATIENT
Start: 2024-07-10 | End: 2024-07-12

## 2024-07-10 RX ORDER — HYDRALAZINE HYDROCHLORIDE 20 MG/ML
5 INJECTION INTRAMUSCULAR; INTRAVENOUS ONCE
Status: COMPLETED | OUTPATIENT
Start: 2024-07-10 | End: 2024-07-10

## 2024-07-10 RX ORDER — ISOSORBIDE MONONITRATE 60 MG/1
60 TABLET, EXTENDED RELEASE ORAL DAILY
Status: DISCONTINUED | OUTPATIENT
Start: 2024-07-10 | End: 2024-07-10

## 2024-07-10 RX ORDER — LIDOCAINE HYDROCHLORIDE 10 MG/ML
5 INJECTION INFILTRATION; PERINEURAL ONCE
Status: DISCONTINUED | OUTPATIENT
Start: 2024-07-10 | End: 2024-07-18 | Stop reason: HOSPADM

## 2024-07-10 ASSESSMENT — PAIN SCALES - GENERAL
PAINLEVEL_OUTOF10: 7
PAINLEVEL_OUTOF10: 0 - NO PAIN
PAINLEVEL_OUTOF10: 0 - NO PAIN
PAINLEVEL_OUTOF10: 7

## 2024-07-10 ASSESSMENT — PAIN DESCRIPTION - LOCATION: LOCATION: CHEST

## 2024-07-10 ASSESSMENT — PAIN - FUNCTIONAL ASSESSMENT: PAIN_FUNCTIONAL_ASSESSMENT: NO/DENIES PAIN

## 2024-07-10 NOTE — NURSING NOTE
.Report from Sending RN:    Report From: PONCHO Mayorga  Recent Surgery of Procedure: No  Baseline Level of Consciousness (LOC): A&O X3  Oxygen Use: Yes  Type: 2L NC  Diabetic: No  Last BP Med Given Day of Dialysis: coreg, hydralazine  Last Pain Med Given: tramadol  Lab Tests to be Obtained with Dialysis: No  Blood Transfusion to be Given During Dialysis: No  Available IV Access: Yes  Medications to be Administered During Dialysis: No  Continuous IV Infusion Running: No  Restraints on Currently or in the Last 24 Hours: No  Hand-Off Communication: Pt had no acute event this morning, SBP in the 190's, all BP medication given. Pt on Droplet precaution for Covid. Travel by cart.  Dialysis Catheter Dressing: AVF  Last Dressing Change: N/A

## 2024-07-10 NOTE — PROGRESS NOTES
Stacey Heath is a 54 y.o. female on day 0 of admission presenting with Fluid overload.    Subjective   Call placed to patients outpatient HD Center, [facility] [address]- provided update that patient is COVID+ and they confirmed they are able to accommodate patient.     - Jeimy GUERRERO MA, LSW  Care Transitions   [facility] Secure Chat or [phone]

## 2024-07-10 NOTE — PROGRESS NOTES
Called pt to see if she was still available for initial appt with this RD. Pt answered but is in the hosp. States she has COVID, chart also indicates pt was admitted with fluid overload. This RD will reach out to pt to reschedule once she is d/c'd

## 2024-07-10 NOTE — CARE PLAN
The patient's goals for the shift include      The clinical goals for the shift include Patient's SBP will improve to 140    Patient BP medication changed today to accommodate her increase in BP. Last BP was 184/79 MD aware

## 2024-07-10 NOTE — PROGRESS NOTES
"Stacey Heath is a 54 y.o. female on day 0 of admission presenting with Fluid overload.           Objective     Physical Exam    Vitals reviewed.   Constitutional:       Appearance: Normal appearance.   HENT:      Head: Normocephalic.      Mouth/Throat:      Mouth: Mucous membranes are dry.   Eyes:      Extraocular Movements: Extraocular movements intact.      Conjunctiva/sclera: Conjunctivae normal.      Pupils: Pupils are equal, round, and reactive to light.   Cardiovascular:      Rate and Rhythm: Normal rate and regular rhythm.      Pulses: Normal pulses.      Heart sounds: Normal heart sounds, S1 normal and S2 normal.      Comments: S3 heard on auscultation  Pulmonary:      Effort: Pulmonary effort is normal.      Breath sounds: Normal air entry. Rales present.   Abdominal:      General: Abdomen is flat. Bowel sounds are normal.      Palpations: Abdomen is soft.   Musculoskeletal:         General: Normal range of motion.      Cervical back: Full passive range of motion without pain and normal range of motion.   Skin:     General: Skin is warm and dry.   Neurological:      General: No focal deficit present.      Mental Status: She is alert and oriented to person, place, and time. Mental status is at baseline.   Psychiatric:         Attention and Perception: Attention normal.         Mood and Affect: Mood normal.         Speech: Speech normal.     Last Recorded Vitals  Blood pressure (!) 194/76, pulse 98, temperature 36.4 °C (97.5 °F), temperature source Temporal, resp. rate 18, height 1.397 m (4' 7\"), weight 54.4 kg (119 lb 14.9 oz), SpO2 100%.  Intake/Output last 3 Shifts:  I/O last 3 completed shifts:  In: 1800 (33.1 mL/kg) [I.V.:800 (14.7 mL/kg); Other:1000]  Out: 6106 (112.2 mL/kg) [Other:6106]  Weight: 54.4 kg     Relevant Results                             Assessment/Plan   Principal Problem:    Fluid overload  Active Problems:    ESRD (end stage renal disease) (Emili Heath is a 54-year-old female " with PMHx:ESRD 2/2 HTN and diabetes on dialysis T/Th/Sat via RUE AVF (last complete session 6/27/24) and poorly controlled hypertension, DMT2, COPD, brachial DVT on Eliquis (4/14/2024) who presented to the ED with SOB, dry non productive cough, Nausea and emesis x2 and states she just could not breath.  While in ED she was found to have a BNP over 5000, vitals were stable and CXR shows congestion.  No missed dialysis. Patient to be admitted observation for Acute on Chronic HFpEF.      Subjective   -BPs 221/113 at 0500 this morning, complained of dizziness. Manual /85. Got the PO PRN hydral. Increased Coreg to 37.5 BID and added Imdur 30 mg daily    -SW to confirm that her HD Center can take her back as COVID+   -Plan for HD 7/11       Time > 40 min           Acute on Chronic HFpEF  ESRD  -Dialysis T/TH/SA at [facility] [address]  -BNP in ED over 5000  -Dialysis and nephrology consulted and appreciate recs  -Patient going for ultrafiltration tonight and will go for regular dialysis in a.m.  -Continue home torsemide every day except for dialysis days  -Daily weights and strict I/O's     T2DM  -Stable diet controlled no home meds     COPD  -Continue home Breo Ellipta  -DuoNebs every 8 hours and as needed albuterol     DVT  HLD  HTN  -Continue home apixaban 5 mg twice daily  -Continue home atorvastatin 80 mg daily  -Continue home carvedilol 25 mg twice daily        Fluids: monitor and replete as needed  Electrolytes: monitor and replete as needed  Nutrition: renal diet   GI prophylaxis: as needed  DVT prophylaxis: SCD  Code Status: Full Code      Disposition: home              I spent 40 minutes in the professional and overall care of this patient.      Soniya Ruff MD

## 2024-07-10 NOTE — SIGNIFICANT EVENT
Rapid Response RN Note     07/10/24 0540   Onset Documentation   Rapid Response Initiated By Radar auto page   Location/Room [facility]  (LK 2029 A)   Pager Time [phone]   Arrival Time 0540   Event End Time 0605   Primary Reason for Call Radar auto page  (score 6)     RADAR score 6 due to the following VS: T 36.5 °Celsius; ; RR 17; /113; SPO2 100%.     Reviewed above VS with bedside RN via phone.  BP verified: 197/85.  Dr. Robb Stone, primary Hospitalist service updated.  Scheduled Hydralazine administered as ordered.  Order for additional IV Hydralazine placed.      Staff to page rapid response for any concerns or acute change in condition/VS.

## 2024-07-10 NOTE — CARE PLAN
Problem: Pain - Adult  Goal: Verbalizes/displays adequate comfort level or baseline comfort level  Outcome: Not Progressing     Problem: Safety - Adult  Goal: Free from fall injury  Outcome: Not Progressing     Problem: Discharge Planning  Goal: Discharge to home or other facility with appropriate resources  Outcome: Not Progressing     Problem: Chronic Conditions and Co-morbidities  Goal: Patient's chronic conditions and co-morbidity symptoms are monitored and maintained or improved  Outcome: Not Progressing     Problem: Pain  Goal: Takes deep breaths with improved pain control throughout the shift  Outcome: Not Progressing  Goal: Turns in bed with improved pain control throughout the shift  Outcome: Not Progressing  Goal: Walks with improved pain control throughout the shift  Outcome: Not Progressing  Goal: Performs ADL's with improved pain control throughout shift  Outcome: Not Progressing  Goal: Participates in PT with improved pain control throughout the shift  Outcome: Not Progressing  Goal: Free from opioid side effects throughout the shift  Outcome: Not Progressing  Goal: Free from acute confusion related to pain meds throughout the shift  Outcome: Not Progressing     Problem: Diabetes  Goal: Achieve decreasing blood glucose levels by end of shift  Outcome: Not Progressing  Goal: Increase stability of blood glucose readings by end of shift  Outcome: Not Progressing  Goal: Decrease in ketones present in urine by end of shift  Outcome: Not Progressing  Goal: Maintain electrolyte levels within acceptable range throughout shift  Outcome: Not Progressing  Goal: Maintain glucose levels >70mg/dl to <250mg/dl throughout shift  Outcome: Not Progressing  Goal: No changes in neurological exam by end of shift  Outcome: Not Progressing  Goal: Learn about and adhere to nutrition recommendations by end of shift  Outcome: Not Progressing  Goal: Vital signs within normal range for age by end of shift  Outcome: Not  Progressing  Goal: Increase self care and/or family involovement by end of shift  Outcome: Not Progressing  Goal: Receive DSME education by end of shift  Outcome: Not Progressing   The patient's goals for the shift include      The clinical goals for the shift include pt will have no vomiting    Over the shift, the patient did not make progress toward the following goals. Barriers to progression include bp. Recommendations to address these barriers include .    Problem: Pain - Adult  Goal: Verbalizes/displays adequate comfort level or baseline comfort level  Outcome: Not Progressing     Problem: Safety - Adult  Goal: Free from fall injury  Outcome: Not Progressing     Problem: Discharge Planning  Goal: Discharge to home or other facility with appropriate resources  Outcome: Not Progressing     Problem: Chronic Conditions and Co-morbidities  Goal: Patient's chronic conditions and co-morbidity symptoms are monitored and maintained or improved  Outcome: Not Progressing     Problem: Pain  Goal: Takes deep breaths with improved pain control throughout the shift  Outcome: Not Progressing  Goal: Turns in bed with improved pain control throughout the shift  Outcome: Not Progressing  Goal: Walks with improved pain control throughout the shift  Outcome: Not Progressing  Goal: Performs ADL's with improved pain control throughout shift  Outcome: Not Progressing  Goal: Participates in PT with improved pain control throughout the shift  Outcome: Not Progressing  Goal: Free from opioid side effects throughout the shift  Outcome: Not Progressing  Goal: Free from acute confusion related to pain meds throughout the shift  Outcome: Not Progressing     Problem: Diabetes  Goal: Achieve decreasing blood glucose levels by end of shift  Outcome: Not Progressing  Goal: Increase stability of blood glucose readings by end of shift  Outcome: Not Progressing  Goal: Decrease in ketones present in urine by end of shift  Outcome: Not  Progressing  Goal: Maintain electrolyte levels within acceptable range throughout shift  Outcome: Not Progressing  Goal: Maintain glucose levels >70mg/dl to <250mg/dl throughout shift  Outcome: Not Progressing  Goal: No changes in neurological exam by end of shift  Outcome: Not Progressing  Goal: Learn about and adhere to nutrition recommendations by end of shift  Outcome: Not Progressing  Goal: Vital signs within normal range for age by end of shift  Outcome: Not Progressing  Goal: Increase self care and/or family involovement by end of shift  Outcome: Not Progressing  Goal: Receive DSME education by end of shift  Outcome: Not Progressing

## 2024-07-10 NOTE — NURSING NOTE
Report to Receiving RN:    Report To: Tierra ISAAC   Time Report Called: [id number]  Hand-Off Communication: iUF ended with 1 hr 12 minutes remaining per pt request, Dr Barraza made aware. Pt signed dialysis AMA form. Removed 800 mL. Post /83 Hr 84. Zofran given while here.   Complications During Treatment: N/V/D   Ultrafiltration Treatment: Yes  Medications Administered During Dialysis: Yes, see MAR   Blood Products Administered During Dialysis: No  Labs Sent During Dialysis: No  Heparin Drip Rate Changes: N/A  Dialysis Catheter Dressing: N.A  Last Dressing Change: N/A

## 2024-07-10 NOTE — NURSING NOTE
Bedside RN requested placement of PIV for patient. RUE not assessed due to presence of iHD access. LUE examine using ultrasound with no veins visualized for cannulation. Informed bedside RN

## 2024-07-10 NOTE — PROGRESS NOTES
Renal Staff HD Note    Patient seen, examined on IUF  Tolerating treatment without event  183/83 O2 per NC    BP Readings from Last 3 Encounters:   07/10/24 (!) 191/76   07/01/24 171/72   05/31/24 117/67     [unfilled]  Lab Results   Component Value Date    CREATININE 11.36 (H) 07/09/2024    BUN 72 (H) 07/09/2024     07/09/2024    K 5.4 (H) 07/09/2024    CL 96 (L) 07/09/2024    CO2 21 07/09/2024     Lab Results   Component Value Date    PTH 1,415.0 (H) 05/31/2024    CALCIUM 8.3 (L) 07/09/2024    CAION 1.18 03/14/2023    PHOS 6.4 (H) 07/09/2024     @  Lab Results   Component Value Date    HGB 8.5 (L) 07/09/2024       Continue treatment per submitted orders  800 ml fluid off  Patient signed AMA form, c/o nausea/lightheadedness so stopped treatment early    Plan regular dialysis tomorrow

## 2024-07-11 ENCOUNTER — APPOINTMENT (OUTPATIENT)
Dept: DIALYSIS | Facility: HOSPITAL | Age: 54
End: 2024-07-11
Payer: COMMERCIAL

## 2024-07-11 ENCOUNTER — PHARMACY VISIT (OUTPATIENT)
Dept: PHARMACY | Facility: CLINIC | Age: 54
End: 2024-07-11
Payer: COMMERCIAL

## 2024-07-11 LAB — GLUCOSE BLD MANUAL STRIP-MCNC: 74 MG/DL (ref 74–99)

## 2024-07-11 PROCEDURE — 2500000001 HC RX 250 WO HCPCS SELF ADMINISTERED DRUGS (ALT 637 FOR MEDICARE OP): Performed by: STUDENT IN AN ORGANIZED HEALTH CARE EDUCATION/TRAINING PROGRAM

## 2024-07-11 PROCEDURE — 94640 AIRWAY INHALATION TREATMENT: CPT

## 2024-07-11 PROCEDURE — 96372 THER/PROPH/DIAG INJ SC/IM: CPT | Performed by: STUDENT IN AN ORGANIZED HEALTH CARE EDUCATION/TRAINING PROGRAM

## 2024-07-11 PROCEDURE — 99232 SBSQ HOSP IP/OBS MODERATE 35: CPT | Performed by: STUDENT IN AN ORGANIZED HEALTH CARE EDUCATION/TRAINING PROGRAM

## 2024-07-11 PROCEDURE — 6350000001 HC RX 635 EPOETIN >10,000 UNITS: Mod: JZ | Performed by: NURSE PRACTITIONER

## 2024-07-11 PROCEDURE — RXMED WILLOW AMBULATORY MEDICATION CHARGE

## 2024-07-11 PROCEDURE — 90935 HEMODIALYSIS ONE EVALUATION: CPT | Performed by: INTERNAL MEDICINE

## 2024-07-11 PROCEDURE — 82947 ASSAY GLUCOSE BLOOD QUANT: CPT

## 2024-07-11 PROCEDURE — G0378 HOSPITAL OBSERVATION PER HR: HCPCS

## 2024-07-11 PROCEDURE — 2500000001 HC RX 250 WO HCPCS SELF ADMINISTERED DRUGS (ALT 637 FOR MEDICARE OP): Performed by: NURSE PRACTITIONER

## 2024-07-11 PROCEDURE — 2500000002 HC RX 250 W HCPCS SELF ADMINISTERED DRUGS (ALT 637 FOR MEDICARE OP, ALT 636 FOR OP/ED): Performed by: STUDENT IN AN ORGANIZED HEALTH CARE EDUCATION/TRAINING PROGRAM

## 2024-07-11 PROCEDURE — 2500000004 HC RX 250 GENERAL PHARMACY W/ HCPCS (ALT 636 FOR OP/ED): Performed by: STUDENT IN AN ORGANIZED HEALTH CARE EDUCATION/TRAINING PROGRAM

## 2024-07-11 RX ORDER — HYDRALAZINE HYDROCHLORIDE 20 MG/ML
10 INJECTION INTRAMUSCULAR; INTRAVENOUS EVERY 6 HOURS PRN
Status: DISCONTINUED | OUTPATIENT
Start: 2024-07-11 | End: 2024-07-18 | Stop reason: HOSPADM

## 2024-07-11 RX ORDER — TRAMADOL HYDROCHLORIDE 50 MG/1
50 TABLET ORAL EVERY 12 HOURS PRN
Qty: 15 TABLET | Refills: 0 | Status: SHIPPED | OUTPATIENT
Start: 2024-07-11 | End: 2024-07-14

## 2024-07-11 RX ORDER — DIPHENHYDRAMINE HCL 25 MG
25 CAPSULE ORAL EVERY 6 HOURS PRN
Qty: 20 CAPSULE | Refills: 0 | Status: SHIPPED | OUTPATIENT
Start: 2024-07-11 | End: 2024-07-18 | Stop reason: HOSPADM

## 2024-07-11 RX ORDER — BENZONATATE 100 MG/1
100 CAPSULE ORAL 3 TIMES DAILY PRN
Qty: 20 CAPSULE | Refills: 0 | Status: SHIPPED | OUTPATIENT
Start: 2024-07-11

## 2024-07-11 RX ORDER — ISOSORBIDE MONONITRATE 30 MG/1
30 TABLET, EXTENDED RELEASE ORAL DAILY
Qty: 30 TABLET | Refills: 0 | Status: SHIPPED | OUTPATIENT
Start: 2024-07-12 | End: 2024-07-18 | Stop reason: HOSPADM

## 2024-07-11 RX ORDER — CARVEDILOL 12.5 MG/1
37 TABLET ORAL 2 TIMES DAILY
Qty: 180 TABLET | Refills: 0 | Status: ON HOLD | OUTPATIENT
Start: 2024-07-11 | End: 2024-07-25

## 2024-07-11 RX ORDER — HYDRALAZINE HYDROCHLORIDE 20 MG/ML
10 INJECTION INTRAMUSCULAR; INTRAVENOUS ONCE
Status: COMPLETED | OUTPATIENT
Start: 2024-07-11 | End: 2024-07-11

## 2024-07-11 ASSESSMENT — COGNITIVE AND FUNCTIONAL STATUS - GENERAL
DAILY ACTIVITIY SCORE: 24
MOBILITY SCORE: 24

## 2024-07-11 ASSESSMENT — PAIN SCALES - GENERAL
PAINLEVEL_OUTOF10: 8
PAINLEVEL_OUTOF10: 8
PAINLEVEL_OUTOF10: 6

## 2024-07-11 ASSESSMENT — PAIN SCALES - WONG BAKER: WONGBAKER_NUMERICALRESPONSE: HURTS WHOLE LOT

## 2024-07-11 NOTE — NURSING NOTE
Report from Sending RN:    Report From: Carrie Rubio RN)  Recent Surgery of Procedure: No  Baseline Level of Consciousness (LOC): a/o x 4  Oxygen Use: No  Type: none  Diabetic: Yes,   Last BP Med Given Day of Dialysis: coreg 12.5 mg 1005 am isosorbide 30 mg 0900 am  Last Pain Med Given: tramadol 50 mg 1139 am  Lab Tests to be Obtained with Dialysis: No  Blood Transfusion to be Given During Dialysis: No  Available IV Access: Yes,   Medications to be Administered During Dialysis: No  Continuous IV Infusion Running: No  Restraints on Currently or in the Last 24 Hours: No  Hand-Off Communication: No acute overnight or morning events; Pt did take morning medications; Pt will not need labs; Pt is a full code; Cassidy Gonzalez RN.  Dialysis Catheter Dressing: fistula right arm  Last Dressing Change: n/a

## 2024-07-11 NOTE — PROGRESS NOTES
Renal Staff HD Note    Patient seen, examined on dialysis   Tolerating treatment without event  130/60 2K     BP Readings from Last 3 Encounters:   07/11/24 171/80   07/01/24 171/72   05/31/24 117/67     [unfilled]  Lab Results   Component Value Date    CREATININE 11.36 (H) 07/09/2024    BUN 72 (H) 07/09/2024     07/09/2024    K 5.4 (H) 07/09/2024    CL 96 (L) 07/09/2024    CO2 21 07/09/2024     Lab Results   Component Value Date    PTH 1,415.0 (H) 05/31/2024    CALCIUM 8.3 (L) 07/09/2024    CAION 1.18 03/14/2023    PHOS 6.4 (H) 07/09/2024     @  Lab Results   Component Value Date    HGB 8.5 (L) 07/09/2024       Continue treatment per submitted orders   2L as tolerated  IUF tomorrow

## 2024-07-11 NOTE — NURSING NOTE
.Report to Receiving RN:    Report To: PONCHO Butler  Time Report Called: [id number]  Hand-Off Communication: Pt tolerated HD well with no issue. Fluid removal 1.4 Liter Post /115 HR 81  Complications During Treatment: No  Ultrafiltration Treatment: No  Medications Administered During Dialysis: No  Blood Products Administered During Dialysis: No  Labs Sent During Dialysis: No  Heparin Drip Rate Changes: No  Dialysis Catheter Dressing: AVF  Last Dressing Change: N/A

## 2024-07-11 NOTE — POST-PROCEDURE NOTE
Pre-Procedure Checklist:  Emergent Line Insertion: No  Type of Line to be Placed: PICC  Consent Obtained: Yes  Emergency Medication Necessary: No  Patient Identified with 2 Independent Identifiers: Yes  Review of Allergies, Anticoagulation, Relevant Labs, ECG/Telemetry: Yes  Risks/Benefits/Alternatives Discussed with Patient/POA/Legal Representative: Yes  Stop Sign on Door: Yes  Time Out Performed: Yes  Catheter Exchange: No    Positioning Checklist:  All People, Including Patient, in the Room with Cap and Mask: Yes  Fluoroscopy Used to Identify Vessel and Guide Insertion: No   Sterile Cover Used: Yes  Full Barrier Precautions Followed (Mask, Cap, Gown, Gloves): Yes  Hands Washed: Yes  Monitors Attached with Sound Alarms On: No  Full Body Sterile Drape (Head-to-Toe) Used to Cover Patient: Yes  Trendelenburg Position (For IJ and Subclavian): No  CHG Skin Prep Used and Allowed to Air Dry to Skin Procedure: Yes    Procedure Checklist:  Blood Aspirated From All Lumens, All Ports Subsequently Flushed: Yes  Catheter Caps Placed on All Lumens; Lumens Clamped: Yes  Maintain Guidewire Control Throughout, Ensuring Guidewire Removal: Yes  Maintain Sterile Field Throughout Insertion: Yes  Catheter Secured: Yes  Confirmatory Test of Venous Placement: Non-Pulsatile Blood    Post Procedure Checklist:  Date and Time Written on Dressing: Yes  Sharp and Wire Count and Safe Disposal of all Sharps/Wires: Yes  Sterile Dressing Applied Per Protocol: Yes  X-ray Ordered or ECG Image: Yes    PICC Insertion Details:  Size (Fr): 3  Lumen Type: Single   Catheter to Vein Ratio Less Than 50%: Yes  Total Length (cm): 9  External Length (cm): 0  Orientation:  Left  Location: Basilic   Site Prep: Chlorohexidine; Usual sterile procedure followed  Local Anesthetic: Injectable/Subcutaneous  Indication: Poor Access, IV Medications   Insertion Team Members in the Room: Nurse, LPN  Initial Extremity Circumference (cm): 25  Insertion Attempts: 1  Patient  Tolerance: Tolerated Well, Age Appropriate  Comfort Measures: Subcutaneous anesthetic; Verbal  Procedure Location: Bedside  Safety Measures: Patient specific safety measures addressed with Bedside RN  Estimated Blood Loss (mL): 0  Vessel Fully Compressible Proximally and Distally to Insertion Site: Yes  Brisk Blood Return Obtained and Line Draws Easily: Yes  Tip Location: Axilla   Line Confirmation: Non Pulsatile Blood,   Lot #: [id number]  : Bard  PICC Line Exp Date:06/30/2025  Securement: Stat Lock  Post Procedure Checklist: Handoff with RN; Obtain all new IV tubing prior to use; Bed at lowest level and wheels locked; Line discharge information at bedside.  Additional Details: Line was inserted using Modified Seldinger's Technique.   Placed by: Dora García RN

## 2024-07-12 LAB
GLUCOSE BLD MANUAL STRIP-MCNC: 72 MG/DL (ref 74–99)
GLUCOSE BLD MANUAL STRIP-MCNC: 73 MG/DL (ref 74–99)
GLUCOSE BLD MANUAL STRIP-MCNC: 78 MG/DL (ref 74–99)
GLUCOSE BLD MANUAL STRIP-MCNC: 79 MG/DL (ref 74–99)

## 2024-07-12 PROCEDURE — G0378 HOSPITAL OBSERVATION PER HR: HCPCS

## 2024-07-12 PROCEDURE — 82947 ASSAY GLUCOSE BLOOD QUANT: CPT

## 2024-07-12 PROCEDURE — 94640 AIRWAY INHALATION TREATMENT: CPT

## 2024-07-12 PROCEDURE — 2500000002 HC RX 250 W HCPCS SELF ADMINISTERED DRUGS (ALT 637 FOR MEDICARE OP, ALT 636 FOR OP/ED): Performed by: STUDENT IN AN ORGANIZED HEALTH CARE EDUCATION/TRAINING PROGRAM

## 2024-07-12 PROCEDURE — RXMED WILLOW AMBULATORY MEDICATION CHARGE

## 2024-07-12 PROCEDURE — 2500000001 HC RX 250 WO HCPCS SELF ADMINISTERED DRUGS (ALT 637 FOR MEDICARE OP): Performed by: STUDENT IN AN ORGANIZED HEALTH CARE EDUCATION/TRAINING PROGRAM

## 2024-07-12 PROCEDURE — 2500000004 HC RX 250 GENERAL PHARMACY W/ HCPCS (ALT 636 FOR OP/ED): Performed by: STUDENT IN AN ORGANIZED HEALTH CARE EDUCATION/TRAINING PROGRAM

## 2024-07-12 PROCEDURE — 99233 SBSQ HOSP IP/OBS HIGH 50: CPT | Performed by: STUDENT IN AN ORGANIZED HEALTH CARE EDUCATION/TRAINING PROGRAM

## 2024-07-12 PROCEDURE — 2500000001 HC RX 250 WO HCPCS SELF ADMINISTERED DRUGS (ALT 637 FOR MEDICARE OP): Performed by: NURSE PRACTITIONER

## 2024-07-12 PROCEDURE — 96372 THER/PROPH/DIAG INJ SC/IM: CPT | Performed by: STUDENT IN AN ORGANIZED HEALTH CARE EDUCATION/TRAINING PROGRAM

## 2024-07-12 RX ORDER — LOPERAMIDE HYDROCHLORIDE 2 MG/1
4 CAPSULE ORAL ONCE
Status: DISCONTINUED | OUTPATIENT
Start: 2024-07-12 | End: 2024-07-15

## 2024-07-12 RX ORDER — NIFEDIPINE 30 MG/1
30 TABLET, FILM COATED, EXTENDED RELEASE ORAL
Status: DISCONTINUED | OUTPATIENT
Start: 2024-07-13 | End: 2024-07-13

## 2024-07-12 RX ORDER — ISOSORBIDE MONONITRATE 60 MG/1
60 TABLET, EXTENDED RELEASE ORAL DAILY
Status: DISCONTINUED | OUTPATIENT
Start: 2024-07-13 | End: 2024-07-13

## 2024-07-12 RX ORDER — ACETAMINOPHEN 325 MG/1
650 TABLET ORAL EVERY 6 HOURS PRN
Status: DISCONTINUED | OUTPATIENT
Start: 2024-07-12 | End: 2024-07-13

## 2024-07-12 RX ORDER — LOPERAMIDE HYDROCHLORIDE 2 MG/1
4 CAPSULE ORAL ONCE
Status: COMPLETED | OUTPATIENT
Start: 2024-07-12 | End: 2024-07-12

## 2024-07-12 RX ORDER — LOPERAMIDE HYDROCHLORIDE 2 MG/1
2 CAPSULE ORAL 4 TIMES DAILY PRN
Status: DISCONTINUED | OUTPATIENT
Start: 2024-07-12 | End: 2024-07-13

## 2024-07-12 RX ORDER — LOPERAMIDE HYDROCHLORIDE 2 MG/1
2 CAPSULE ORAL 4 TIMES DAILY PRN
Qty: 30 CAPSULE | Refills: 0 | Status: SHIPPED | OUTPATIENT
Start: 2024-07-12 | End: 2024-07-18 | Stop reason: HOSPADM

## 2024-07-12 ASSESSMENT — COGNITIVE AND FUNCTIONAL STATUS - GENERAL
DAILY ACTIVITIY SCORE: 24
DAILY ACTIVITIY SCORE: 24
MOBILITY SCORE: 24
MOBILITY SCORE: 24

## 2024-07-12 ASSESSMENT — PAIN - FUNCTIONAL ASSESSMENT
PAIN_FUNCTIONAL_ASSESSMENT: 0-10

## 2024-07-12 ASSESSMENT — PAIN DESCRIPTION - ORIENTATION: ORIENTATION: MID

## 2024-07-12 ASSESSMENT — PAIN SCALES - PAIN ASSESSMENT IN ADVANCED DEMENTIA (PAINAD): TOTALSCORE: MEDICATION (SEE MAR)

## 2024-07-12 ASSESSMENT — PAIN SCALES - GENERAL
PAINLEVEL_OUTOF10: 8
PAINLEVEL_OUTOF10: 3
PAINLEVEL_OUTOF10: 7
PAINLEVEL_OUTOF10: 4
PAINLEVEL_OUTOF10: 3

## 2024-07-12 ASSESSMENT — PAIN DESCRIPTION - LOCATION: LOCATION: CHEST

## 2024-07-12 ASSESSMENT — PAIN SCALES - WONG BAKER: WONGBAKER_NUMERICALRESPONSE: HURTS WHOLE LOT

## 2024-07-12 NOTE — NURSING NOTE
Report from Sending RN:    Report From: Milly ( RN)  Recent Surgery of Procedure: No  Baseline Level of Consciousness (LOC): a/o x 4  Oxygen Use: No  Type: none  Diabetic: No  Last BP Med Given Day of Dialysis: none  Last Pain Med Given: none  Lab Tests to be Obtained with Dialysis: No  Blood Transfusion to be Given During Dialysis: No  Available IV Access: Yes  Medications to be Administered During Dialysis: No  Continuous IV Infusion Running: No  Restraints on Currently or in the Last 24 Hours: No  Hand-Off Communication: No acute overnight or morning events; vss; Pt did take morning medications; Pt will not need labs; Pt is a full code; Cassidy Gonzalez RN. Cassidy Gonzalez RN.  Dialysis Catheter Dressing: fistula right upper arm  Last Dressing Change: n/a

## 2024-07-12 NOTE — PROGRESS NOTES
Pt came to the unit, spoke with writer and said she was going to refuse UF tx. Says she didn't feel good, was having diarrhea and would rather go back to her room. Nephrology and Floor nurse Carrie ISAAC was updated. Pt waiting for transport to take her back to [facility]

## 2024-07-12 NOTE — PROGRESS NOTES
"Stacey Heath is a 54 y.o. female on day 0 of admission presenting with Fluid overload.           Objective     Physical Exam    Vitals reviewed.   Constitutional:       Appearance: Normal appearance.   HENT:      Head: Normocephalic.      Mouth/Throat:      Mouth: Mucous membranes are dry.   Eyes:      Extraocular Movements: Extraocular movements intact.      Conjunctiva/sclera: Conjunctivae normal.      Pupils: Pupils are equal, round, and reactive to light.   Cardiovascular:      Rate and Rhythm: Normal rate and regular rhythm.      Pulses: Normal pulses.      Heart sounds: Normal heart sounds, S1 normal and S2 normal.      Comments: S3 heard on auscultation  Pulmonary:      Effort: Pulmonary effort is normal.      Breath sounds: Normal air entry. Rales present.   Abdominal:      General: Abdomen is flat. Bowel sounds are normal.      Palpations: Abdomen is soft.   Musculoskeletal:         General: Normal range of motion.      Cervical back: Full passive range of motion without pain and normal range of motion.   Skin:     General: Skin is warm and dry.   Neurological:      General: No focal deficit present.      Mental Status: She is alert and oriented to person, place, and time. Mental status is at baseline.   Psychiatric:         Attention and Perception: Attention normal.         Mood and Affect: Mood normal.         Speech: Speech normal.     Last Recorded Vitals  Blood pressure (!) 192/102, pulse 81, temperature 36.4 °C (97.5 °F), temperature source Temporal, resp. rate 16, height 1.397 m (4' 7\"), weight 54.4 kg (119 lb 14.9 oz), SpO2 100%.  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  While in ED she was found to have a BNP over 5000, vitals were stable and CXR shows congestion.  No missed dialysis. Patient to be admitted observation for Acute on Chronic HFpEF.      Subjective   -BPs 221/113 at 0500 this morning, complained of dizziness. Manual /85. Got the PO PRN hydral. Increased Coreg to 37.5 BID and added Imdur 30 mg daily    -SW to confirm that her HD Center can take her back as COVID+   -Plan for HD 7/11       Time > 40 min           Acute on Chronic HFpEF  ESRD  -Dialysis T/TH/SA at [facility] on [address]  -BNP in ED over 5000  -Dialysis and nephrology consulted and appreciate recs  -Patient going for ultrafiltration tonight and will go for regular dialysis in a.m.  -Continue home torsemide every day except for dialysis days  -Daily weights and strict I/O's     T2DM  -Stable diet controlled no home meds     COPD  -Continue home Breo Ellipta  -DuoNebs every 8 hours and as needed albuterol     DVT  HLD  HTN  -Continue home apixaban 5 mg twice daily  -Continue home atorvastatin 80 mg daily  -Continue home carvedilol 25 mg twice daily        Fluids: monitor and replete as needed  Electrolytes: monitor and replete as needed  Nutrition: renal diet   GI prophylaxis: as needed  DVT prophylaxis: SCD  Code Status: Full Code      Disposition: home              I spent 40 minutes in the professional and overall care of this patient.      Soniya Ruff MD

## 2024-07-12 NOTE — CARE PLAN
The patient's goals for the shift include      The clinical goals for the shift include Patient will have improved blood pressure during shift    Over the shift, the patient did not make progress toward the following goals. Barriers to progression include acute disease process. Recommendations to address these barriers include adherence to treatment plan.

## 2024-07-12 NOTE — CARE PLAN
The patient's goals for the shift include      The clinical goals for the shift include Patient will have improved blood pressure during shift      Problem: Pain - Adult  Goal: Verbalizes/displays adequate comfort level or baseline comfort level  Outcome: Progressing     Problem: Safety - Adult  Goal: Free from fall injury  Outcome: Progressing     Problem: Discharge Planning  Goal: Discharge to home or other facility with appropriate resources  Outcome: Progressing     Problem: Chronic Conditions and Co-morbidities  Goal: Patient's chronic conditions and co-morbidity symptoms are monitored and maintained or improved  Outcome: Progressing     Problem: Pain  Goal: Takes deep breaths with improved pain control throughout the shift  Outcome: Progressing  Goal: Turns in bed with improved pain control throughout the shift  Outcome: Progressing  Goal: Walks with improved pain control throughout the shift  Outcome: Progressing  Goal: Performs ADL's with improved pain control throughout shift  Outcome: Progressing  Goal: Participates in PT with improved pain control throughout the shift  Outcome: Progressing  Goal: Free from opioid side effects throughout the shift  Outcome: Progressing  Goal: Free from acute confusion related to pain meds throughout the shift  Outcome: Progressing     Problem: Diabetes  Goal: Achieve decreasing blood glucose levels by end of shift  Outcome: Progressing  Goal: Increase stability of blood glucose readings by end of shift  Outcome: Progressing  Goal: Decrease in ketones present in urine by end of shift  Outcome: Progressing  Goal: Maintain electrolyte levels within acceptable range throughout shift  Outcome: Progressing  Goal: Maintain glucose levels >70mg/dl to <250mg/dl throughout shift  Outcome: Progressing  Goal: No changes in neurological exam by end of shift  Outcome: Progressing  Goal: Learn about and adhere to nutrition recommendations by end of shift  Outcome: Progressing  Goal:  Vital signs within normal range for age by end of shift  Outcome: Progressing  Goal: Increase self care and/or family involovement by end of shift  Outcome: Progressing  Goal: Receive DSME education by end of shift  Outcome: Progressing

## 2024-07-13 ENCOUNTER — APPOINTMENT (OUTPATIENT)
Dept: DIALYSIS | Facility: HOSPITAL | Age: 54
End: 2024-07-13
Payer: COMMERCIAL

## 2024-07-13 LAB
ALBUMIN SERPL BCP-MCNC: 3.9 G/DL (ref 3.4–5)
ANION GAP SERPL CALC-SCNC: 20 MMOL/L (ref 10–20)
BUN SERPL-MCNC: 42 MG/DL (ref 6–23)
CALCIUM SERPL-MCNC: 9.4 MG/DL (ref 8.6–10.6)
CHLORIDE SERPL-SCNC: 93 MMOL/L (ref 98–107)
CO2 SERPL-SCNC: 27 MMOL/L (ref 21–32)
CREAT SERPL-MCNC: 7.97 MG/DL (ref 0.5–1.05)
EGFRCR SERPLBLD CKD-EPI 2021: 6 ML/MIN/1.73M*2
ERYTHROCYTE [DISTWIDTH] IN BLOOD BY AUTOMATED COUNT: 17.9 % (ref 11.5–14.5)
GLUCOSE BLD MANUAL STRIP-MCNC: 122 MG/DL (ref 74–99)
GLUCOSE BLD MANUAL STRIP-MCNC: 125 MG/DL (ref 74–99)
GLUCOSE BLD MANUAL STRIP-MCNC: 140 MG/DL (ref 74–99)
GLUCOSE BLD MANUAL STRIP-MCNC: 63 MG/DL (ref 74–99)
GLUCOSE SERPL-MCNC: 53 MG/DL (ref 74–99)
HCT VFR BLD AUTO: 34.2 % (ref 36–46)
HGB BLD-MCNC: 10.2 G/DL (ref 12–16)
MCH RBC QN AUTO: 27.2 PG (ref 26–34)
MCHC RBC AUTO-ENTMCNC: 29.8 G/DL (ref 32–36)
MCV RBC AUTO: 91 FL (ref 80–100)
NRBC BLD-RTO: 1 /100 WBCS (ref 0–0)
PHOSPHATE SERPL-MCNC: 5.3 MG/DL (ref 2.5–4.9)
PLATELET # BLD AUTO: 249 X10*3/UL (ref 150–450)
POTASSIUM SERPL-SCNC: 4.4 MMOL/L (ref 3.5–5.3)
RBC # BLD AUTO: 3.75 X10*6/UL (ref 4–5.2)
SODIUM SERPL-SCNC: 136 MMOL/L (ref 136–145)
WBC # BLD AUTO: 4.2 X10*3/UL (ref 4.4–11.3)

## 2024-07-13 PROCEDURE — 82947 ASSAY GLUCOSE BLOOD QUANT: CPT

## 2024-07-13 PROCEDURE — 2500000001 HC RX 250 WO HCPCS SELF ADMINISTERED DRUGS (ALT 637 FOR MEDICARE OP): Performed by: NURSE PRACTITIONER

## 2024-07-13 PROCEDURE — 2500000001 HC RX 250 WO HCPCS SELF ADMINISTERED DRUGS (ALT 637 FOR MEDICARE OP): Performed by: STUDENT IN AN ORGANIZED HEALTH CARE EDUCATION/TRAINING PROGRAM

## 2024-07-13 PROCEDURE — G0378 HOSPITAL OBSERVATION PER HR: HCPCS

## 2024-07-13 PROCEDURE — 85027 COMPLETE CBC AUTOMATED: CPT | Performed by: STUDENT IN AN ORGANIZED HEALTH CARE EDUCATION/TRAINING PROGRAM

## 2024-07-13 PROCEDURE — 94640 AIRWAY INHALATION TREATMENT: CPT

## 2024-07-13 PROCEDURE — 99233 SBSQ HOSP IP/OBS HIGH 50: CPT | Performed by: STUDENT IN AN ORGANIZED HEALTH CARE EDUCATION/TRAINING PROGRAM

## 2024-07-13 PROCEDURE — 2500000002 HC RX 250 W HCPCS SELF ADMINISTERED DRUGS (ALT 637 FOR MEDICARE OP, ALT 636 FOR OP/ED): Performed by: STUDENT IN AN ORGANIZED HEALTH CARE EDUCATION/TRAINING PROGRAM

## 2024-07-13 PROCEDURE — 6350000001 HC RX 635 EPOETIN >10,000 UNITS: Mod: JZ | Performed by: NURSE PRACTITIONER

## 2024-07-13 PROCEDURE — 90935 HEMODIALYSIS ONE EVALUATION: CPT | Performed by: INTERNAL MEDICINE

## 2024-07-13 PROCEDURE — 80069 RENAL FUNCTION PANEL: CPT | Performed by: STUDENT IN AN ORGANIZED HEALTH CARE EDUCATION/TRAINING PROGRAM

## 2024-07-13 PROCEDURE — 2500000004 HC RX 250 GENERAL PHARMACY W/ HCPCS (ALT 636 FOR OP/ED): Performed by: STUDENT IN AN ORGANIZED HEALTH CARE EDUCATION/TRAINING PROGRAM

## 2024-07-13 RX ORDER — ACETAMINOPHEN 325 MG/1
650 TABLET ORAL 3 TIMES DAILY
Status: DISCONTINUED | OUTPATIENT
Start: 2024-07-13 | End: 2024-07-18 | Stop reason: HOSPADM

## 2024-07-13 RX ORDER — ISOSORBIDE MONONITRATE 60 MG/1
120 TABLET, EXTENDED RELEASE ORAL DAILY
Status: DISCONTINUED | OUTPATIENT
Start: 2024-07-13 | End: 2024-07-18 | Stop reason: HOSPADM

## 2024-07-13 RX ORDER — IPRATROPIUM BROMIDE AND ALBUTEROL SULFATE 2.5; .5 MG/3ML; MG/3ML
3 SOLUTION RESPIRATORY (INHALATION)
Status: DISCONTINUED | OUTPATIENT
Start: 2024-07-13 | End: 2024-07-18 | Stop reason: HOSPADM

## 2024-07-13 RX ORDER — LOPERAMIDE HYDROCHLORIDE 2 MG/1
2 CAPSULE ORAL 4 TIMES DAILY
Status: DISCONTINUED | OUTPATIENT
Start: 2024-07-13 | End: 2024-07-15

## 2024-07-13 RX ORDER — ALUMINUM HYDROXIDE, MAGNESIUM HYDROXIDE, AND SIMETHICONE 1200; 120; 1200 MG/30ML; MG/30ML; MG/30ML
10 SUSPENSION ORAL
Status: DISCONTINUED | OUTPATIENT
Start: 2024-07-13 | End: 2024-07-18 | Stop reason: HOSPADM

## 2024-07-13 RX ORDER — NIFEDIPINE 90 MG/1
90 TABLET, EXTENDED RELEASE ORAL
Status: DISCONTINUED | OUTPATIENT
Start: 2024-07-13 | End: 2024-07-18 | Stop reason: HOSPADM

## 2024-07-13 ASSESSMENT — COGNITIVE AND FUNCTIONAL STATUS - GENERAL
DAILY ACTIVITIY SCORE: 24
MOBILITY SCORE: 24
MOBILITY SCORE: 24
DAILY ACTIVITIY SCORE: 24

## 2024-07-13 ASSESSMENT — PAIN SCALES - GENERAL
PAINLEVEL_OUTOF10: 7
PAINLEVEL_OUTOF10: 0 - NO PAIN
PAINLEVEL_OUTOF10: 5 - MODERATE PAIN

## 2024-07-13 ASSESSMENT — PAIN SCALES - WONG BAKER: WONGBAKER_NUMERICALRESPONSE: NO HURT

## 2024-07-13 ASSESSMENT — PAIN - FUNCTIONAL ASSESSMENT: PAIN_FUNCTIONAL_ASSESSMENT: 0-10

## 2024-07-13 NOTE — PROGRESS NOTES
"Stacey Heath is a 54 y.o. female on day 0 of admission presenting with Fluid overload.           Objective     Physical Exam    Vitals reviewed.   Constitutional:       Appearance: Normal appearance.   HENT:      Head: Normocephalic.      Mouth/Throat:      Mouth: Mucous membranes are dry.   Eyes:      Extraocular Movements: Extraocular movements intact.      Conjunctiva/sclera: Conjunctivae normal.      Pupils: Pupils are equal, round, and reactive to light.   Cardiovascular:      Rate and Rhythm: Normal rate and regular rhythm.      Pulses: Normal pulses.      Heart sounds: Normal heart sounds, S1 normal and S2 normal.      Comments: S3 heard on auscultation  Pulmonary:      Effort: Pulmonary effort is normal.      Breath sounds: Normal air entry. Rales present.   Abdominal:      General: Abdomen is flat. Bowel sounds are normal.      Palpations: Abdomen is soft.   Musculoskeletal:         General: Normal range of motion.      Cervical back: Full passive range of motion without pain and normal range of motion.   Skin:     General: Skin is warm and dry.   Neurological:      General: No focal deficit present.      Mental Status: She is alert and oriented to person, place, and time. Mental status is at baseline.   Psychiatric:         Attention and Perception: Attention normal.         Mood and Affect: Mood normal.         Speech: Speech normal.     Last Recorded Vitals  Blood pressure 150/69, pulse 72, temperature 36.5 °C (97.7 °F), temperature source Temporal, resp. rate 18, height 1.397 m (4' 7\"), weight 54.4 kg (119 lb 14.9 oz), SpO2 100%.  Intake/Output last 3 Shifts:  No intake/output data recorded.    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  While in ED she was found to have a BNP over 5000, vitals were stable and CXR shows congestion.  No missed dialysis. Patient to be admitted observation for Acute on Chronic HFpEF.      Subjective   Pt was refusing care and meds with RN this am, still experiencing diarrhea and headache which is secondary to elevated BP. Has a conversation with her and explained the importance of med adherence, Pt seems more agreeable. OF note, Pt did not agree to remove more than 2 L per session on previous HD sessions, and many sessions cuts the treatment off early ( or even has missed a UF this week). Reinforced salt and fluid restriction and that she needs to stay the full session and not call off sessions. Also I escalated her HTN meds. Will keep monitoring VS. Planning for HD today. Added imodium for diarrhea as scheduled as she forgets to ask for it frequently.        Time 50 min         #COVID positive  #Abd pain and diarrhea secondary to COVID    -supportive treatment      #Acute on Chronic HFpEF  #ESRD  -Dialysis T/TH/SA at [facility] on [address]  -BNP in ED over 5000  -Dialysis and nephrology consulted and appreciate recs  -Continue home torsemide every day except for dialysis days  -Daily weights and strict I/O's     T2DM  -Stable diet controlled no home meds     COPD  -Continue home Breo Ellipta  -DuoNebs every 8 hours and as needed albuterol     DVT  HLD  HTN  -Continue home apixaban 5 mg twice daily  -Continue home atorvastatin 80 mg daily  -Continue home carvedilol 37.5 mg twice daily        Fluids: monitor and replete as needed  Electrolytes: monitor and replete as needed  Nutrition: renal diet   GI prophylaxis: as needed  DVT prophylaxis: SCD  Code Status: Full Code      Disposition: home eventually                     Soniya MELARA  MD Speedy

## 2024-07-13 NOTE — CARE PLAN
The patient's goals for the shift include      The clinical goals for the shift include Patient blood pressure will improve throughout this      Over the shift, patient remained safe and stable, BP remained on higher side with ordered blood pressure medicine

## 2024-07-13 NOTE — NURSING NOTE
.Report from Sending RN:    Report From: PONCHO Kaplan  Recent Surgery of Procedure: No  Baseline Level of Consciousness (LOC): A&O X3  Oxygen Use: No  Type: Room air  Diabetic: Yes  Last BP Med Given Day of Dialysis: coreg, nifedipine  Last Pain Med Given: none  Lab Tests to be Obtained with Dialysis: No  Blood Transfusion to be Given During Dialysis: No  Available IV Access: Yes  Medications to be Administered During Dialysis: No  Continuous IV Infusion Running: No  Restraints on Currently or in the Last 24 Hours: No  Hand-Off Communication: Pt had no acute event overnight, vital signs stable. Pt on droplet precaution for Covid, all morning medication given.  Dialysis Catheter Dressing: AVF  Last Dressing Change: N/A

## 2024-07-13 NOTE — PROGRESS NOTES
Renal Staff HD Note    Patient seen, examined on dialysis   Tolerating treatment without event  177/81  2K    BP Readings from Last 3 Encounters:   07/13/24 150/69   07/01/24 171/72   05/31/24 117/67     [unfilled]  Lab Results   Component Value Date    CREATININE 7.97 (H) 07/13/2024    BUN 42 (H) 07/13/2024     07/13/2024    K 4.4 07/13/2024    CL 93 (L) 07/13/2024    CO2 27 07/13/2024     Lab Results   Component Value Date    PTH 1,415.0 (H) 05/31/2024    CALCIUM 9.4 07/13/2024    CAION 1.18 03/14/2023    PHOS 5.3 (H) 07/13/2024     @  Lab Results   Component Value Date    HGB 10.2 (L) 07/13/2024       Continue treatment per submitted orders  2L

## 2024-07-13 NOTE — NURSING NOTE
Report to Receiving RN:    Report To: Rosaura RN  Time Report Called: [id number] per secure chat  Hand-Off Communication: patient received a 1.5 hour treatment D/T ELI AVF infiltration, 0.5 liter of fluid removed, VSS  Complications During Treatment: Yes, AVF infiltration  Ultrafiltration Treatment: No  Medications Administered During Dialysis: Yes, zofran 4 mg IV, tramadol 50 mg po  Blood Products Administered During Dialysis: No  Labs Sent During Dialysis: No  Heparin Drip Rate Changes: N/A  Dialysis Catheter Dressing: N/A  Last Dressing Change: N/A

## 2024-07-13 NOTE — PROGRESS NOTES
"Stacey Heath is a 54 y.o. female on day 0 of admission presenting with Fluid overload.           Objective     Physical Exam    Vitals reviewed.   Constitutional:       Appearance: Normal appearance.   HENT:      Head: Normocephalic.      Mouth/Throat:      Mouth: Mucous membranes are dry.   Eyes:      Extraocular Movements: Extraocular movements intact.      Conjunctiva/sclera: Conjunctivae normal.      Pupils: Pupils are equal, round, and reactive to light.   Cardiovascular:      Rate and Rhythm: Normal rate and regular rhythm.      Pulses: Normal pulses.      Heart sounds: Normal heart sounds, S1 normal and S2 normal.      Comments: S3 heard on auscultation  Pulmonary:      Effort: Pulmonary effort is normal.      Breath sounds: Normal air entry. Rales present.   Abdominal:      General: Abdomen is flat. Bowel sounds are normal.      Palpations: Abdomen is soft.   Musculoskeletal:         General: Normal range of motion.      Cervical back: Full passive range of motion without pain and normal range of motion.   Skin:     General: Skin is warm and dry.   Neurological:      General: No focal deficit present.      Mental Status: She is alert and oriented to person, place, and time. Mental status is at baseline.   Psychiatric:         Attention and Perception: Attention normal.         Mood and Affect: Mood normal.         Speech: Speech normal.     Last Recorded Vitals  Blood pressure (!) 188/96, pulse 77, temperature 36.2 °C (97.2 °F), temperature source Temporal, resp. rate 16, height 1.397 m (4' 7\"), weight 54.4 kg (119 lb 14.9 oz), SpO2 100%.  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  While in ED she was found to have a BNP over 5000, vitals were stable and CXR shows congestion.  No missed dialysis. Patient to be admitted observation for Acute on Chronic HFpEF.      Subjective   She refused HD this morning  Stated that she is having a new onset of diarrhea. Explained that diarrhea is likely due to COVID. Added Imodium for diarrhea.   HTN still uncontrolled, added Nifedipine 30 mg and increased to Imdur 60 mg for a better BP control.  Plan for discharge home tomorrow PM.   -SW to confirm that her HD Center can take her back as COVID+         Time 50 min           Acute on Chronic HFpEF  ESRD  -Dialysis T/TH/SA at [facility] on [address]  -BNP in ED over 5000  -Dialysis and nephrology consulted and appreciate recs  -Patient going for ultrafiltration tonight and will go for regular dialysis in a.m.  -Continue home torsemide every day except for dialysis days  -Daily weights and strict I/O's     T2DM  -Stable diet controlled no home meds     COPD  -Continue home Breo Ellipta  -DuoNebs every 8 hours and as needed albuterol     DVT  HLD  HTN  -Continue home apixaban 5 mg twice daily  -Continue home atorvastatin 80 mg daily  -Continue home carvedilol 37.5 mg twice daily        Fluids: monitor and replete as needed  Electrolytes: monitor and replete as needed  Nutrition: renal diet   GI prophylaxis: as needed  DVT prophylaxis: SCD  Code Status: Full Code      Disposition: home                     Soniya Ruff MD

## 2024-07-14 VITALS
SYSTOLIC BLOOD PRESSURE: 161 MMHG | TEMPERATURE: 97.2 F | HEART RATE: 75 BPM | OXYGEN SATURATION: 95 % | HEIGHT: 55 IN | RESPIRATION RATE: 16 BRPM | DIASTOLIC BLOOD PRESSURE: 72 MMHG | WEIGHT: 119.93 LBS | BODY MASS INDEX: 27.76 KG/M2

## 2024-07-14 LAB
ALBUMIN SERPL BCP-MCNC: 3.8 G/DL (ref 3.4–5)
ANION GAP SERPL CALC-SCNC: 18 MMOL/L (ref 10–20)
BUN SERPL-MCNC: 36 MG/DL (ref 6–23)
CALCIUM SERPL-MCNC: 9 MG/DL (ref 8.6–10.6)
CHLORIDE SERPL-SCNC: 95 MMOL/L (ref 98–107)
CO2 SERPL-SCNC: 28 MMOL/L (ref 21–32)
CREAT SERPL-MCNC: 8.65 MG/DL (ref 0.5–1.05)
EGFRCR SERPLBLD CKD-EPI 2021: 5 ML/MIN/1.73M*2
ERYTHROCYTE [DISTWIDTH] IN BLOOD BY AUTOMATED COUNT: 18.4 % (ref 11.5–14.5)
GLUCOSE BLD MANUAL STRIP-MCNC: 106 MG/DL (ref 74–99)
GLUCOSE BLD MANUAL STRIP-MCNC: 118 MG/DL (ref 74–99)
GLUCOSE BLD MANUAL STRIP-MCNC: 171 MG/DL (ref 74–99)
GLUCOSE BLD MANUAL STRIP-MCNC: 66 MG/DL (ref 74–99)
GLUCOSE SERPL-MCNC: 123 MG/DL (ref 74–99)
HCT VFR BLD AUTO: 31.9 % (ref 36–46)
HGB BLD-MCNC: 9.4 G/DL (ref 12–16)
INR PPP: 2.1 (ref 0.9–1.1)
MCH RBC QN AUTO: 27.6 PG (ref 26–34)
MCHC RBC AUTO-ENTMCNC: 29.5 G/DL (ref 32–36)
MCV RBC AUTO: 94 FL (ref 80–100)
NRBC BLD-RTO: 0.7 /100 WBCS (ref 0–0)
PHOSPHATE SERPL-MCNC: 5 MG/DL (ref 2.5–4.9)
PLATELET # BLD AUTO: 263 X10*3/UL (ref 150–450)
POTASSIUM SERPL-SCNC: 4.3 MMOL/L (ref 3.5–5.3)
PROTHROMBIN TIME: 24.3 SECONDS (ref 9.8–12.8)
RBC # BLD AUTO: 3.41 X10*6/UL (ref 4–5.2)
SODIUM SERPL-SCNC: 137 MMOL/L (ref 136–145)
WBC # BLD AUTO: 5.6 X10*3/UL (ref 4.4–11.3)

## 2024-07-14 PROCEDURE — 85027 COMPLETE CBC AUTOMATED: CPT | Performed by: STUDENT IN AN ORGANIZED HEALTH CARE EDUCATION/TRAINING PROGRAM

## 2024-07-14 PROCEDURE — 99233 SBSQ HOSP IP/OBS HIGH 50: CPT | Performed by: STUDENT IN AN ORGANIZED HEALTH CARE EDUCATION/TRAINING PROGRAM

## 2024-07-14 PROCEDURE — 94640 AIRWAY INHALATION TREATMENT: CPT

## 2024-07-14 PROCEDURE — 2500000001 HC RX 250 WO HCPCS SELF ADMINISTERED DRUGS (ALT 637 FOR MEDICARE OP): Performed by: STUDENT IN AN ORGANIZED HEALTH CARE EDUCATION/TRAINING PROGRAM

## 2024-07-14 PROCEDURE — 2500000001 HC RX 250 WO HCPCS SELF ADMINISTERED DRUGS (ALT 637 FOR MEDICARE OP): Performed by: NURSE PRACTITIONER

## 2024-07-14 PROCEDURE — 2500000002 HC RX 250 W HCPCS SELF ADMINISTERED DRUGS (ALT 637 FOR MEDICARE OP, ALT 636 FOR OP/ED): Performed by: STUDENT IN AN ORGANIZED HEALTH CARE EDUCATION/TRAINING PROGRAM

## 2024-07-14 PROCEDURE — 82947 ASSAY GLUCOSE BLOOD QUANT: CPT

## 2024-07-14 PROCEDURE — G0378 HOSPITAL OBSERVATION PER HR: HCPCS

## 2024-07-14 PROCEDURE — 84100 ASSAY OF PHOSPHORUS: CPT | Performed by: STUDENT IN AN ORGANIZED HEALTH CARE EDUCATION/TRAINING PROGRAM

## 2024-07-14 PROCEDURE — 2500000004 HC RX 250 GENERAL PHARMACY W/ HCPCS (ALT 636 FOR OP/ED): Mod: JG | Performed by: STUDENT IN AN ORGANIZED HEALTH CARE EDUCATION/TRAINING PROGRAM

## 2024-07-14 PROCEDURE — 85610 PROTHROMBIN TIME: CPT | Performed by: STUDENT IN AN ORGANIZED HEALTH CARE EDUCATION/TRAINING PROGRAM

## 2024-07-14 RX ORDER — BISMUTH SUBSALICYLATE 262 MG/1
524 TABLET ORAL 4 TIMES DAILY
Status: DISCONTINUED | OUTPATIENT
Start: 2024-07-14 | End: 2024-07-15

## 2024-07-14 RX ORDER — L. ACIDOPHILUS/L.BULGARICUS 1MM CELL
1 TABLET ORAL 4 TIMES DAILY
Status: DISCONTINUED | OUTPATIENT
Start: 2024-07-14 | End: 2024-07-18 | Stop reason: HOSPADM

## 2024-07-14 RX ORDER — METRONIDAZOLE 500 MG/100ML
500 INJECTION, SOLUTION INTRAVENOUS EVERY 8 HOURS
Status: DISCONTINUED | OUTPATIENT
Start: 2024-07-14 | End: 2024-07-15

## 2024-07-14 ASSESSMENT — PAIN SCALES - GENERAL
PAINLEVEL_OUTOF10: 5 - MODERATE PAIN
PAINLEVEL_OUTOF10: 5 - MODERATE PAIN

## 2024-07-14 ASSESSMENT — PAIN - FUNCTIONAL ASSESSMENT
PAIN_FUNCTIONAL_ASSESSMENT: 0-10
PAIN_FUNCTIONAL_ASSESSMENT: 0-10

## 2024-07-14 NOTE — CARE PLAN
The patient's goals for the shift include  safety    The clinical goals for the shift include Patient will remain safe and free from any falls/injuries overnight    No issues overnight

## 2024-07-14 NOTE — CARE PLAN
The patient's goals for the shift include      The clinical goals for the shift include Pt. will verbalize improved pain by end of shift    Over the shift, the patient did not make progress toward the following goals. Barriers to progression include. Recommendations to address these barriers include.    Problem: Pain - Adult  Goal: Verbalizes/displays adequate comfort level or baseline comfort level  Outcome: Progressing     Problem: Pain  Goal: Takes deep breaths with improved pain control throughout the shift  Outcome: Progressing  Goal: Turns in bed with improved pain control throughout the shift  Outcome: Progressing  Goal: Walks with improved pain control throughout the shift  Outcome: Progressing  Goal: Performs ADL's with improved pain control throughout shift  Outcome: Progressing  Goal: Participates in PT with improved pain control throughout the shift  Outcome: Progressing  Goal: Free from opioid side effects throughout the shift  Outcome: Progressing  Goal: Free from acute confusion related to pain meds throughout the shift  Outcome: Progressing     Problem: Diabetes  Goal: Achieve decreasing blood glucose levels by end of shift  Outcome: Progressing  Goal: Increase stability of blood glucose readings by end of shift  Outcome: Progressing  Goal: Decrease in ketones present in urine by end of shift  Outcome: Progressing  Goal: Maintain electrolyte levels within acceptable range throughout shift  Outcome: Progressing  Goal: Maintain glucose levels >70mg/dl to <250mg/dl throughout shift  Outcome: Progressing  Goal: No changes in neurological exam by end of shift  Outcome: Progressing  Goal: Learn about and adhere to nutrition recommendations by end of shift  Outcome: Progressing  Goal: Vital signs within normal range for age by end of shift  Outcome: Progressing  Goal: Increase self care and/or family involovement by end of shift  Outcome: Progressing  Goal: Receive DSME education by end of shift  Outcome:  Progressing

## 2024-07-15 ENCOUNTER — APPOINTMENT (OUTPATIENT)
Dept: DIALYSIS | Facility: HOSPITAL | Age: 54
End: 2024-07-15
Payer: COMMERCIAL

## 2024-07-15 ENCOUNTER — APPOINTMENT (OUTPATIENT)
Dept: PODIATRY | Facility: CLINIC | Age: 54
End: 2024-07-15
Payer: COMMERCIAL

## 2024-07-15 ENCOUNTER — DOCUMENTATION (OUTPATIENT)
Dept: CARE COORDINATION | Facility: CLINIC | Age: 54
End: 2024-07-15

## 2024-07-15 PROBLEM — E87.70 HYPERVOLEMIA, UNSPECIFIED HYPERVOLEMIA TYPE: Status: ACTIVE | Noted: 2024-07-15

## 2024-07-15 LAB
GLUCOSE BLD MANUAL STRIP-MCNC: 86 MG/DL (ref 74–99)
GLUCOSE BLD MANUAL STRIP-MCNC: 95 MG/DL (ref 74–99)

## 2024-07-15 PROCEDURE — 2500000001 HC RX 250 WO HCPCS SELF ADMINISTERED DRUGS (ALT 637 FOR MEDICARE OP): Performed by: STUDENT IN AN ORGANIZED HEALTH CARE EDUCATION/TRAINING PROGRAM

## 2024-07-15 PROCEDURE — 2500000001 HC RX 250 WO HCPCS SELF ADMINISTERED DRUGS (ALT 637 FOR MEDICARE OP): Performed by: NURSE PRACTITIONER

## 2024-07-15 PROCEDURE — 2500000002 HC RX 250 W HCPCS SELF ADMINISTERED DRUGS (ALT 637 FOR MEDICARE OP, ALT 636 FOR OP/ED): Performed by: STUDENT IN AN ORGANIZED HEALTH CARE EDUCATION/TRAINING PROGRAM

## 2024-07-15 PROCEDURE — 2500000004 HC RX 250 GENERAL PHARMACY W/ HCPCS (ALT 636 FOR OP/ED): Mod: JG | Performed by: STUDENT IN AN ORGANIZED HEALTH CARE EDUCATION/TRAINING PROGRAM

## 2024-07-15 PROCEDURE — 2500000004 HC RX 250 GENERAL PHARMACY W/ HCPCS (ALT 636 FOR OP/ED): Performed by: STUDENT IN AN ORGANIZED HEALTH CARE EDUCATION/TRAINING PROGRAM

## 2024-07-15 PROCEDURE — 8010000001 HC DIALYSIS - HEMODIALYSIS PER DAY

## 2024-07-15 PROCEDURE — 1100000001 HC PRIVATE ROOM DAILY

## 2024-07-15 PROCEDURE — 94640 AIRWAY INHALATION TREATMENT: CPT

## 2024-07-15 PROCEDURE — 82947 ASSAY GLUCOSE BLOOD QUANT: CPT

## 2024-07-15 PROCEDURE — 90935 HEMODIALYSIS ONE EVALUATION: CPT | Performed by: INTERNAL MEDICINE

## 2024-07-15 PROCEDURE — 99233 SBSQ HOSP IP/OBS HIGH 50: CPT | Performed by: STUDENT IN AN ORGANIZED HEALTH CARE EDUCATION/TRAINING PROGRAM

## 2024-07-15 RX ORDER — ALUMINUM HYDROXIDE, MAGNESIUM HYDROXIDE, AND SIMETHICONE 1200; 120; 1200 MG/30ML; MG/30ML; MG/30ML
10 SUSPENSION ORAL 4 TIMES DAILY PRN
Status: DISCONTINUED | OUTPATIENT
Start: 2024-07-15 | End: 2024-07-18 | Stop reason: HOSPADM

## 2024-07-15 RX ORDER — LOPERAMIDE HYDROCHLORIDE 2 MG/1
2 CAPSULE ORAL 4 TIMES DAILY PRN
Status: DISCONTINUED | OUTPATIENT
Start: 2024-07-15 | End: 2024-07-18 | Stop reason: HOSPADM

## 2024-07-15 SDOH — HEALTH STABILITY: MENTAL HEALTH
STRESS IS WHEN SOMEONE FEELS TENSE, NERVOUS, ANXIOUS, OR CAN'T SLEEP AT NIGHT BECAUSE THEIR MIND IS TROUBLED. HOW STRESSED ARE YOU?: ONLY A LITTLE

## 2024-07-15 SDOH — SOCIAL STABILITY: SOCIAL NETWORK
DO YOU BELONG TO ANY CLUBS OR ORGANIZATIONS SUCH AS CHURCH GROUPS UNIONS, FRATERNAL OR ATHLETIC GROUPS, OR SCHOOL GROUPS?: NO

## 2024-07-15 SDOH — SOCIAL STABILITY: SOCIAL INSECURITY
WITHIN THE LAST YEAR, HAVE TO BEEN RAPED OR FORCED TO HAVE ANY KIND OF SEXUAL ACTIVITY BY YOUR PARTNER OR EX-PARTNER?: NO

## 2024-07-15 SDOH — SOCIAL STABILITY: SOCIAL INSECURITY: WITHIN THE LAST YEAR, HAVE YOU BEEN AFRAID OF YOUR PARTNER OR EX-PARTNER?: NO

## 2024-07-15 SDOH — ECONOMIC STABILITY: FOOD INSECURITY: WITHIN THE PAST 12 MONTHS, THE FOOD YOU BOUGHT JUST DIDN'T LAST AND YOU DIDN'T HAVE MONEY TO GET MORE.: NEVER TRUE

## 2024-07-15 SDOH — SOCIAL STABILITY: SOCIAL NETWORK: HOW OFTEN DO YOU GET TOGETHER WITH FRIENDS OR RELATIVES?: ONCE A WEEK

## 2024-07-15 SDOH — ECONOMIC STABILITY: FOOD INSECURITY: WITHIN THE PAST 12 MONTHS, YOU WORRIED THAT YOUR FOOD WOULD RUN OUT BEFORE YOU GOT MONEY TO BUY MORE.: NEVER TRUE

## 2024-07-15 SDOH — SOCIAL STABILITY: SOCIAL INSECURITY
WITHIN THE LAST YEAR, HAVE YOU BEEN KICKED, HIT, SLAPPED, OR OTHERWISE PHYSICALLY HURT BY YOUR PARTNER OR EX-PARTNER?: NO

## 2024-07-15 SDOH — SOCIAL STABILITY: SOCIAL INSECURITY: WITHIN THE LAST YEAR, HAVE YOU BEEN HUMILIATED OR EMOTIONALLY ABUSED IN OTHER WAYS BY YOUR PARTNER OR EX-PARTNER?: NO

## 2024-07-15 SDOH — ECONOMIC STABILITY: HOUSING INSECURITY: AT ANY TIME IN THE PAST 12 MONTHS, WERE YOU HOMELESS OR LIVING IN A SHELTER (INCLUDING NOW)?: NO

## 2024-07-15 SDOH — ECONOMIC STABILITY: INCOME INSECURITY: IN THE LAST 12 MONTHS, WAS THERE A TIME WHEN YOU WERE NOT ABLE TO PAY THE MORTGAGE OR RENT ON TIME?: NO

## 2024-07-15 SDOH — ECONOMIC STABILITY: HOUSING INSECURITY: IN THE PAST 12 MONTHS, HOW MANY TIMES HAVE YOU MOVED WHERE YOU WERE LIVING?: 0

## 2024-07-15 SDOH — SOCIAL STABILITY: SOCIAL NETWORK
IN A TYPICAL WEEK, HOW MANY TIMES DO YOU TALK ON THE PHONE WITH FAMILY, FRIENDS, OR NEIGHBORS?: MORE THAN THREE TIMES A WEEK

## 2024-07-15 SDOH — ECONOMIC STABILITY: INCOME INSECURITY: HOW HARD IS IT FOR YOU TO PAY FOR THE VERY BASICS LIKE FOOD, HOUSING, MEDICAL CARE, AND HEATING?: NOT HARD AT ALL

## 2024-07-15 SDOH — SOCIAL STABILITY: SOCIAL NETWORK: HOW OFTEN DO YOU ATTEND CHURCH OR RELIGIOUS SERVICES?: MORE THAN 4 TIMES PER YEAR

## 2024-07-15 ASSESSMENT — COGNITIVE AND FUNCTIONAL STATUS - GENERAL
DAILY ACTIVITIY SCORE: 24
MOBILITY SCORE: 24
MOBILITY SCORE: 24
DAILY ACTIVITIY SCORE: 24
DAILY ACTIVITIY SCORE: 24
MOBILITY SCORE: 24

## 2024-07-15 ASSESSMENT — PAIN - FUNCTIONAL ASSESSMENT
PAIN_FUNCTIONAL_ASSESSMENT: 0-10
PAIN_FUNCTIONAL_ASSESSMENT: NO/DENIES PAIN

## 2024-07-15 ASSESSMENT — PAIN SCALES - WONG BAKER: WONGBAKER_NUMERICALRESPONSE: NO HURT

## 2024-07-15 ASSESSMENT — PAIN SCALES - GENERAL
PAINLEVEL_OUTOF10: 7
PAINLEVEL_OUTOF10: 5 - MODERATE PAIN
PAINLEVEL_OUTOF10: 3

## 2024-07-15 NOTE — PROGRESS NOTES
Renal Staff HD Note    Patient seen, examined on dialysis.  Tolerating treatment without event    BP Readings from Last 3 Encounters:   07/15/24 (!) 183/84   07/01/24 171/72   05/31/24 117/67     [unfilled]  Lab Results   Component Value Date    CREATININE 8.65 (H) 07/14/2024    BUN 36 (H) 07/14/2024     07/14/2024    K 4.3 07/14/2024    CL 95 (L) 07/14/2024    CO2 28 07/14/2024     Lab Results   Component Value Date    PTH 1,415.0 (H) 05/31/2024    CALCIUM 9.0 07/14/2024    CAION 1.18 03/14/2023    PHOS 5.0 (H) 07/14/2024     @  Lab Results   Component Value Date    HGB 9.4 (L) 07/14/2024       Continue treatment per submitted orders

## 2024-07-15 NOTE — CARE PLAN
The patient's goals for the shift include  pain control and b/p readings     The clinical goals for the shift include Patient will remain safe and free from and falls/injuries overnight    Patient was given PRN pain medication at bedtime which was effective with no other issues overnight, yet this mornings B/P was greater than 170 and PRN medication was given and report to next shift to recheck vital signs

## 2024-07-15 NOTE — CARE PLAN
The patient's goals for the shift include      The clinical goals for the shift include Patient will remain safe and free from and falls/injuries overnight    Problem: Pain - Adult  Goal: Verbalizes/displays adequate comfort level or baseline comfort level  Outcome: Progressing     Problem: Safety - Adult  Goal: Free from fall injury  Outcome: Progressing     Problem: Discharge Planning  Goal: Discharge to home or other facility with appropriate resources  Outcome: Progressing     Problem: Chronic Conditions and Co-morbidities  Goal: Patient's chronic conditions and co-morbidity symptoms are monitored and maintained or improved  Outcome: Progressing     Problem: Pain  Goal: Takes deep breaths with improved pain control throughout the shift  Outcome: Progressing  Goal: Turns in bed with improved pain control throughout the shift  Outcome: Progressing  Goal: Walks with improved pain control throughout the shift  Outcome: Progressing  Goal: Performs ADL's with improved pain control throughout shift  Outcome: Progressing  Goal: Participates in PT with improved pain control throughout the shift  Outcome: Progressing  Goal: Free from opioid side effects throughout the shift  Outcome: Progressing  Goal: Free from acute confusion related to pain meds throughout the shift  Outcome: Progressing     Problem: Diabetes  Goal: Achieve decreasing blood glucose levels by end of shift  Outcome: Progressing  Goal: Increase stability of blood glucose readings by end of shift  Outcome: Progressing  Goal: Decrease in ketones present in urine by end of shift  Outcome: Progressing  Goal: Maintain electrolyte levels within acceptable range throughout shift  Outcome: Progressing  Goal: Maintain glucose levels >70mg/dl to <250mg/dl throughout shift  Outcome: Progressing  Goal: No changes in neurological exam by end of shift  Outcome: Progressing  Goal: Learn about and adhere to nutrition recommendations by end of shift  Outcome:  Progressing  Goal: Vital signs within normal range for age by end of shift  Outcome: Progressing  Goal: Increase self care and/or family involovement by end of shift  Outcome: Progressing  Goal: Receive DSME education by end of shift  Outcome: Progressing

## 2024-07-15 NOTE — NURSING NOTE
Report to Receiving RN:    Report To: Ange ISAAC  Time Report Called: [id number]  Hand-Off Communication: tolerated treatment, 1.8 liters of fluid removed, VSS  Complications During Treatment: No  Ultrafiltration Treatment: Yes  Medications Administered During Dialysis: No  Blood Products Administered During Dialysis: No  Labs Sent During Dialysis: No  Heparin Drip Rate Changes: N/A  Dialysis Catheter Dressing: N/A  Last Dressing Change: N/A

## 2024-07-15 NOTE — NURSING NOTE
Report from Sending RN:    Report From: Yojana  Recent Surgery of Procedure: No  Baseline Level of Consciousness (LOC): A  and O x 4  Oxygen Use: Yes  Type: 2 LPM prn  Diabetic: No  Last BP Med Given Day of Dialysis: 195/81 sashe took meds  Last Pain Med Given: Tramadol  Lab Tests to be Obtained with Dialysis: No  Blood Transfusion to be Given During Dialysis: No  Available IV Access: Yes  Medications to be Administered During Dialysis: No  Continuous IV Infusion Running: No  Restraints on Currently or in the Last 24 Hours: No  Hand-Off Communication: Pt is very comfortable. Agreed to take BP meds after the readings were so high.   Dialysis Catheter Dressing: NA  Last Dressing Change: NA

## 2024-07-15 NOTE — PROGRESS NOTES
"Stacey Heath is a 54 y.o. female on day 0 of admission presenting with Fluid overload.           Objective     Physical Exam    Vitals reviewed.   Constitutional:       Appearance: Normal appearance.   HENT:      Head: Normocephalic.      Mouth/Throat:      Mouth: Mucous membranes are dry.   Eyes:      Extraocular Movements: Extraocular movements intact.      Conjunctiva/sclera: Conjunctivae normal.      Pupils: Pupils are equal, round, and reactive to light.   Cardiovascular:      Rate and Rhythm: Normal rate and regular rhythm.      Pulses: Normal pulses.      Heart sounds: Normal heart sounds, S1 normal and S2 normal.      Comments: S3 heard on auscultation  Pulmonary:      Effort: Pulmonary effort is normal.      Breath sounds: Normal air entry. Rales present.   Abdominal:      General: Abdomen is flat. Bowel sounds are normal.      Palpations: Abdomen is soft.   Musculoskeletal:         General: Normal range of motion.      Cervical back: Full passive range of motion without pain and normal range of motion.   Skin:     General: Skin is warm and dry.   Neurological:      General: No focal deficit present.      Mental Status: She is alert and oriented to person, place, and time. Mental status is at baseline.   Psychiatric:         Attention and Perception: Attention normal.         Mood and Affect: Mood normal.         Speech: Speech normal.     Last Recorded Vitals  Blood pressure (!) 183/84, pulse 72, temperature 36.4 °C (97.5 °F), temperature source Tympanic, resp. rate 18, height 1.397 m (4' 7\"), weight 54.4 kg (119 lb 14.9 oz), SpO2 100%.  Intake/Output last 3 Shifts:  No intake/output data recorded.    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  While in ED she was found to have a BNP over 5000, vitals were stable and CXR shows congestion.  No missed dialysis. Patient to be admitted observation for Acute on Chronic HFpEF.      Subjective   7/14  Pt is still refusing the care, refusing meds. She is unwilling to cooperate with the medical staff despite long conversation this PM. She was seen yesterday and RN was with me at the bedside      7/13  Pt was refusing care and meds with RN this am, still experiencing diarrhea and headache which is secondary to elevated BP. Has a conversation with her and explained the importance of med adherence, Pt seems more agreeable. OF note, Pt did not agree to remove more than 2 L per session on previous HD sessions, and many sessions cuts the treatment off early ( or even has missed a UF this week). Reinforced salt and fluid restriction and that she needs to stay the full session and not call off sessions. Also I escalated her HTN meds. Will keep monitoring VS. Planning for HD today. Added imodium for diarrhea as scheduled as she forgets to ask for it frequently.        Time 50 min         #COVID positive  #Abd pain and diarrhea secondary to COVID    -supportive treatment      #Acute on Chronic HFpEF  #ESRD  -Dialysis T/TH/SA at [facility] on [address]  -BNP in ED over 5000  -Dialysis and nephrology consulted and appreciate recs  -Continue home torsemide every day except for dialysis days  -Daily weights and strict I/O's     T2DM  -Stable diet controlled no home meds     COPD  -Continue home Breo Ellipta  -DuoNebs every 8 hours and as needed albuterol     DVT  HLD  HTN  -Continue home apixaban 5 mg twice daily  -Continue home atorvastatin 80 mg daily  -Continue home carvedilol 37.5 mg twice daily        Fluids: monitor and replete as  needed  Electrolytes: monitor and replete as needed  Nutrition: renal diet   GI prophylaxis: as needed  DVT prophylaxis: SCD  Code Status: Full Code      Disposition: home eventually                     Soniya Ruff MD

## 2024-07-16 ENCOUNTER — APPOINTMENT (OUTPATIENT)
Dept: GASTROENTEROLOGY | Facility: HOSPITAL | Age: 54
End: 2024-07-16
Payer: COMMERCIAL

## 2024-07-16 ENCOUNTER — TELEPHONE (OUTPATIENT)
Dept: GASTROENTEROLOGY | Facility: HOSPITAL | Age: 54
End: 2024-07-16
Payer: COMMERCIAL

## 2024-07-16 ENCOUNTER — PATIENT OUTREACH (OUTPATIENT)
Dept: CARE COORDINATION | Facility: CLINIC | Age: 54
End: 2024-07-16
Payer: COMMERCIAL

## 2024-07-16 ENCOUNTER — DOCUMENTATION (OUTPATIENT)
Dept: BEHAVIORAL HEALTH | Facility: CLINIC | Age: 54
End: 2024-07-16
Payer: COMMERCIAL

## 2024-07-16 ENCOUNTER — PHARMACY VISIT (OUTPATIENT)
Dept: PHARMACY | Facility: CLINIC | Age: 54
End: 2024-07-16
Payer: MEDICARE

## 2024-07-16 ENCOUNTER — APPOINTMENT (OUTPATIENT)
Dept: DIALYSIS | Facility: HOSPITAL | Age: 54
End: 2024-07-16
Payer: COMMERCIAL

## 2024-07-16 LAB
ALBUMIN SERPL BCP-MCNC: 3.6 G/DL (ref 3.4–5)
ANION GAP SERPL CALC-SCNC: 21 MMOL/L (ref 10–20)
BUN SERPL-MCNC: 53 MG/DL (ref 6–23)
CALCIUM SERPL-MCNC: 9.1 MG/DL (ref 8.6–10.6)
CHLORIDE SERPL-SCNC: 97 MMOL/L (ref 98–107)
CO2 SERPL-SCNC: 25 MMOL/L (ref 21–32)
CREAT SERPL-MCNC: 11.54 MG/DL (ref 0.5–1.05)
EGFRCR SERPLBLD CKD-EPI 2021: 4 ML/MIN/1.73M*2
ERYTHROCYTE [DISTWIDTH] IN BLOOD BY AUTOMATED COUNT: 19.6 % (ref 11.5–14.5)
GLUCOSE BLD MANUAL STRIP-MCNC: 125 MG/DL (ref 74–99)
GLUCOSE BLD MANUAL STRIP-MCNC: 139 MG/DL (ref 74–99)
GLUCOSE BLD MANUAL STRIP-MCNC: 199 MG/DL (ref 74–99)
GLUCOSE SERPL-MCNC: 86 MG/DL (ref 74–99)
HCT VFR BLD AUTO: 30.1 % (ref 36–46)
HGB BLD-MCNC: 9.2 G/DL (ref 12–16)
MCH RBC QN AUTO: 28.3 PG (ref 26–34)
MCHC RBC AUTO-ENTMCNC: 30.6 G/DL (ref 32–36)
MCV RBC AUTO: 93 FL (ref 80–100)
NRBC BLD-RTO: 0 /100 WBCS (ref 0–0)
PHOSPHATE SERPL-MCNC: 6.4 MG/DL (ref 2.5–4.9)
PLATELET # BLD AUTO: 264 X10*3/UL (ref 150–450)
POTASSIUM SERPL-SCNC: 5.1 MMOL/L (ref 3.5–5.3)
RBC # BLD AUTO: 3.25 X10*6/UL (ref 4–5.2)
SODIUM SERPL-SCNC: 138 MMOL/L (ref 136–145)
WBC # BLD AUTO: 6.1 X10*3/UL (ref 4.4–11.3)

## 2024-07-16 PROCEDURE — 82947 ASSAY GLUCOSE BLOOD QUANT: CPT

## 2024-07-16 PROCEDURE — 90935 HEMODIALYSIS ONE EVALUATION: CPT | Performed by: INTERNAL MEDICINE

## 2024-07-16 PROCEDURE — 85027 COMPLETE CBC AUTOMATED: CPT | Performed by: STUDENT IN AN ORGANIZED HEALTH CARE EDUCATION/TRAINING PROGRAM

## 2024-07-16 PROCEDURE — 2500000001 HC RX 250 WO HCPCS SELF ADMINISTERED DRUGS (ALT 637 FOR MEDICARE OP): Performed by: INTERNAL MEDICINE

## 2024-07-16 PROCEDURE — 99232 SBSQ HOSP IP/OBS MODERATE 35: CPT | Performed by: INTERNAL MEDICINE

## 2024-07-16 PROCEDURE — 2500000001 HC RX 250 WO HCPCS SELF ADMINISTERED DRUGS (ALT 637 FOR MEDICARE OP): Performed by: STUDENT IN AN ORGANIZED HEALTH CARE EDUCATION/TRAINING PROGRAM

## 2024-07-16 PROCEDURE — 2500000001 HC RX 250 WO HCPCS SELF ADMINISTERED DRUGS (ALT 637 FOR MEDICARE OP): Performed by: NURSE PRACTITIONER

## 2024-07-16 PROCEDURE — 8010000001 HC DIALYSIS - HEMODIALYSIS PER DAY

## 2024-07-16 PROCEDURE — 6350000001 HC RX 635 EPOETIN >10,000 UNITS: Mod: JZ | Performed by: NURSE PRACTITIONER

## 2024-07-16 PROCEDURE — 80069 RENAL FUNCTION PANEL: CPT | Performed by: STUDENT IN AN ORGANIZED HEALTH CARE EDUCATION/TRAINING PROGRAM

## 2024-07-16 PROCEDURE — 1100000001 HC PRIVATE ROOM DAILY

## 2024-07-16 RX ORDER — CLONIDINE HYDROCHLORIDE 0.1 MG/1
0.2 TABLET ORAL ONCE
Status: COMPLETED | OUTPATIENT
Start: 2024-07-16 | End: 2024-07-16

## 2024-07-16 RX ORDER — CLONIDINE HYDROCHLORIDE 0.1 MG/1
0.2 TABLET ORAL EVERY 8 HOURS SCHEDULED
Status: DISCONTINUED | OUTPATIENT
Start: 2024-07-16 | End: 2024-07-17

## 2024-07-16 ASSESSMENT — PAIN - FUNCTIONAL ASSESSMENT
PAIN_FUNCTIONAL_ASSESSMENT: 0-10

## 2024-07-16 ASSESSMENT — PAIN SCALES - GENERAL
PAINLEVEL_OUTOF10: 6
PAINLEVEL_OUTOF10: 7

## 2024-07-16 ASSESSMENT — PAIN DESCRIPTION - LOCATION: LOCATION: ABDOMEN

## 2024-07-16 ASSESSMENT — PAIN DESCRIPTION - DESCRIPTORS: DESCRIPTORS: ACHING

## 2024-07-16 NOTE — NURSING NOTE
Report to Receiving RN:    Report To: Alisson Rubio RN)  Time Report Called: 1005 am  Hand-Off Communication: vs post 146/74; HR 75; fluid removed 400 ml  Complications During Treatment: Yes, Pt ordered for 3.5 HD tx; sh eony completed 1 hr and 15 minutes; Pt signed AMA  Ultrafiltration Treatment: No  Medications Administered During Dialysis: No  Blood Products Administered During Dialysis: No  Labs Sent During Dialysis: yes cbc, rfp  Heparin Drip Rate Changes: No  Dialysis Catheter Dressing: fistula  Last Dressing Change: n/a    Electronic Signatures:   (Signed Franchesca Jenkins. PONCHO)   Authored:    (Signed )   Authored:     Last Updated: 10:04 AM by FRANCHESCA JENKINS

## 2024-07-16 NOTE — PROGRESS NOTES
Stacey Heath is a 54 y.o. female on day 1 of admission presenting with Fluid overload.      Subjective   Patient was seen and examined. Patient declined to leave today concern for her blood pressure. She did not receive nifedipine this morning. Nurse instructed to give her nifedipine     Objective     Last Recorded Vitals  /70   Pulse 73   Temp 36.5 °C (97.7 °F) (Tympanic)   Resp 16   Wt 54.4 kg (119 lb 14.9 oz)   SpO2 100%   Intake/Output last 3 Shifts:    Intake/Output Summary (Last 24 hours) at 7/16/2024 1805  Last data filed at 7/16/2024 0930  Gross per 24 hour   Intake 800 ml   Output 726 ml   Net 74 ml       Admission Weight  Weight: 54.4 kg (120 lb) (07/08/24 0923)    Daily Weight  07/08/24 : 54.4 kg (119 lb 14.9 oz)    Image Results  Bedside Midline Imaging  These images are not reportable by radiology and will not be interpreted   by  Radiologists.      Physical Exam  Constitutional: lying in bed with no distress  HEENT: moist oral mucosa  Neck: No JVD  Lungs: clear to auscultation bilaterally, no wheezing, no rhonchi   Cardiac: regular rate and rhythm, S1 and S2 present  Abdomen: bowel sounds present, non tender, non distended  Ext: No cyanosis, no clubbing, no edema     Relevant Results  Scheduled medications  acetaminophen, 650 mg, oral, TID  alum-mag hydroxide-simeth, 10 mL, oral, With meals & nightly  apixaban, 5 mg, oral, BID  atorvastatin, 80 mg, oral, Nightly  B complex-vitamin C-folic acid, 1 capsule, oral, Daily  calcium acetate, 1,334 mg, oral, TID  carvedilol, 37.5 mg, oral, BID  cloNIDine, 0.2 mg, oral, q8h BELEN  epoetin joceline or biosimilar, 10,000 Units, intravenous, Once per day on Tuesday Thursday Saturday  fluticasone, 2 spray, Each Nostril, Daily  fluticasone furoate-vilanteroL, 1 puff, inhalation, Daily  ipratropium-albuteroL, 3 mL, nebulization, q12h  isosorbide mononitrate ER, 120 mg, oral, Daily  lactobacillus acidophilus, 1 tablet, oral, 4x daily  lidocaine, 5 mL,  infiltration, Once  NIFEdipine ER, 90 mg, oral, Daily before breakfast  sennosides-docusate sodium, 2 tablet, oral, BID  torsemide, 20 mg, oral, Once per day on Sunday Monday Wednesday Friday      Continuous medications     PRN medications  PRN medications: albuterol, alum-mag hydroxide-simeth, benzonatate, diphenhydrAMINE, hydrALAZINE, loperamide, ondansetron, polyethylene glycol, sodium chloride, traMADol     Assessment/Plan   Stacey Heath is a 54-year-old female with a past medical history of end-stage renal disease secondary to hypertension and diabetes, on dialysis (Tuesdays, Thursdays, and Saturdays) via a right upper extremity arteriovenous fistula, as well as poorly controlled hypertension, type 2 diabetes mellitus, COPD, and a brachial deep vein thrombosis on [facility] since 4/14/2024. She presented to the emergency department with a COVID-19 infection, complaining of shortness of breath, a dry non-productive cough, nausea, and vomiting. The patient was started on supportive treatment for nausea, vomiting, abdominal pain, and diarrhea, most of which have resolved. Although she was scheduled for discharge a few days ago, it was delayed due to her refusal of care, medications, and lack of cooperation with the medical staff despite extensive discussions. She also signed out against medical advice several times from the hemodialysis unit, with episodes of very high blood pressure, necessitating escalation of her antihypertensive medications. The primary team reinforced the importance of salt and fluid restriction and emphasized the need for her to complete full dialysis sessions and not miss any. Clonidine has been added to her blood pressure meds. Patient is aware we will monitor for 24 hour and she will be dc tomorrow       PLAN:  COVID positive        1. Asymptomatic     2. Abd pain and diarrhea secondary to COVID        1. Resolved     3.  Chronic HFpEF/ESRD      1. Dialysis T/TH/SA at [facility] on [address]       2. Dialysis and nephrology consulted and appreciate recs      3. Continue home torsemide every day except for dialysis days       4. T2DM     1. Stable diet controlled no home meds     5. COPD      1. Continue home Breo Ellipta      2. DuoNebs every 8 hours and as needed albuterol     6. Hx of DVT/HDL/HTN     1. Continue home apixaban 5 mg twice daily     2. Continue home atorvastatin 80 mg daily     3. Continue home carvedilol 37.5 mg twice daily       Dispo: discharge tomorrow      >35 minutes spent coordinating the care of the patient      Tamiko Van DO

## 2024-07-16 NOTE — PROGRESS NOTES
Spoke to pt. She is still in the hosp. Pt agreed to reschedule appt with this RD for Friday at 2p. If pt is still in hosp will still try to conduct appt telephonically

## 2024-07-16 NOTE — CARE PLAN
The patient's goals for the shift include      The clinical goals for the shift include Patient will remain safe and free from falls thorughout shift    Over the shift, the patient did not make progress toward the following goals. Barriers to progression include . Recommendations to address these barriers include .

## 2024-07-16 NOTE — PROGRESS NOTES
Stacey Heath  Age: 54 y.o.  MRN: [id number]  Date: 7/16/2024  Location of service: phone call    Program Details  Medicaid Community Clinical Case Management  Status: Enrolled  Effective Dates: 10/17/2023 - present  Responsible Staff: NAREN Davidson      Goals Reviewed:  Problem: Anxiety       Goal: Attempts to manage anxiety with help       Priority: High         Goal: Verbalizes ways to manage anxiety       Priority: High         Goal: Implements measures to reduce anxiety       Priority: High            Summary:  This provider made contact with the patient via telephone. Provider listened as patient explained that she is still in isolation for Covid and that her blood pressure is really high. This provider offered to come visit with patient, however patient declined for fear of getting this provider sick. Patient did explain that she is still having covid symptoms.                  NAREN Davidson

## 2024-07-16 NOTE — PROGRESS NOTES
Renal Staff HD Note    Patient seen, examined on dialysis   Tolerating treatment without event    BP Readings from Last 3 Encounters:   07/16/24 180/78   07/01/24 171/72   05/31/24 117/67     [unfilled]  Lab Results   Component Value Date    CREATININE 11.54 (H) 07/16/2024    BUN 53 (H) 07/16/2024     07/16/2024    K 5.1 07/16/2024    CL 97 (L) 07/16/2024    CO2 25 07/16/2024     Lab Results   Component Value Date    PTH 1,415.0 (H) 05/31/2024    CALCIUM 9.1 07/16/2024    CAION 1.18 03/14/2023    PHOS 6.4 (H) 07/16/2024     @  Lab Results   Component Value Date    HGB 9.2 (L) 07/16/2024       Continue treatment per submitted orders

## 2024-07-16 NOTE — CARE PLAN
The patient's goals for the shift include  having a well controlled blood pressure.    The clinical goals for the shift include Pt will rate her pain a 5/10 or less by the end of this shift.    Over the shift, the patient did not make progress toward the following goals. Barriers to progression include patient's blood pressure remained greater than 170 this shift. MD aware. Scheduled Clonidine was added to her medication list.

## 2024-07-16 NOTE — PROGRESS NOTES
"Stacey Heath is a 54 y.o. female on day 0 of admission presenting with Fluid overload.           Objective     Physical Exam    Vitals reviewed.   Constitutional:       Appearance: Normal appearance.   HENT:      Head: Normocephalic.      Mouth/Throat:      Mouth: Mucous membranes are dry.   Eyes:      Extraocular Movements: Extraocular movements intact.      Conjunctiva/sclera: Conjunctivae normal.      Pupils: Pupils are equal, round, and reactive to light.   Cardiovascular:      Rate and Rhythm: Normal rate and regular rhythm.      Pulses: Normal pulses.      Heart sounds: Normal heart sounds, S1 normal and S2 normal.      Comments: S3 heard on auscultation  Pulmonary:      Effort: Pulmonary effort is normal.      Breath sounds: Normal air entry. Rales present.   Abdominal:      General: Abdomen is flat. Bowel sounds are normal.      Palpations: Abdomen is soft.   Musculoskeletal:         General: Normal range of motion.      Cervical back: Full passive range of motion without pain and normal range of motion.   Skin:     General: Skin is warm and dry.   Neurological:      General: No focal deficit present.      Mental Status: She is alert and oriented to person, place, and time. Mental status is at baseline.   Psychiatric:         Attention and Perception: Attention normal.         Mood and Affect: Mood normal.         Speech: Speech normal.     Last Recorded Vitals  Blood pressure (!) 183/82, pulse 76, temperature 36.1 °C (97 °F), temperature source Skin, resp. rate 18, height 1.397 m (4' 7\"), weight 54.4 kg (119 lb 14.9 oz), SpO2 100%.  Intake/Output last 3 Shifts:  I/O last 3 completed shifts:  In: 1040 (19.1 mL/kg) [P.O.:240; I.V.:600 (11 mL/kg); Other:200]  Out: 1795 (33 mL/kg) [Other:1795]  Weight: 54.4 kg     Relevant Results                             Assessment/Plan   Principal Problem:    Fluid overload  Active Problems:    ESRD (end stage renal disease) (Multi)    Hypervolemia, unspecified " hypervolemia type        Stacey Heath is a 54-year-old female with a past medical history of end-stage renal disease secondary to hypertension and diabetes, on dialysis (Tuesdays, Thursdays, and Saturdays) via a right upper extremity arteriovenous fistula, as well as poorly controlled hypertension, type 2 diabetes mellitus, COPD, and a 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 She presented to the emergency department with a COVID-19 infection, complaining of shortness of breath, a dry non-productive cough, nausea, and vomiting. The patient was started on supportive treatment for nausea, vomiting, abdominal pain, and diarrhea, most of which have resolved. Although she was scheduled for discharge a few days ago, it was delayed due to her refusal of care, medications, and lack of cooperation with the medical staff despite extensive discussions. She also signed out against medical advice several times from the hemodialysis unit, with episodes of very high blood pressure, necessitating escalation of her antihypertensive medications. The primary team reinforced the importance of salt and fluid restriction and emphasized the need for her to complete full dialysis sessions and not miss any. As of today, she appears much better, but her blood pressure remains high, which is why she will stay overnight until her blood pressure is more controlled. Dispo: home.        Time 50 min         #COVID positive  #Abd pain and diarrhea secondary to COVID   - resolved with supportive treatment      #Chronic HFpEF  #ESRD  -Dialysis T/TH/SA at [facility] on [address]  -Dialysis and nephrology consulted and appreciate recs  -Continue home torsemide every day except for dialysis days  -Daily weights and strict I/O's     T2DM  -Stable diet controlled no home meds     COPD  -Continue home Breo Ellipta  -DuoNebs every 8 hours and as needed albuterol     DVT  HLD  HTN  -Continue home apixaban 5 mg twice daily  -Continue home  atorvastatin 80 mg daily  -Continue home carvedilol 37.5 mg twice daily        Fluids: monitor and replete as needed  Electrolytes: monitor and replete as needed  Nutrition: renal diet   GI prophylaxis: as needed  DVT prophylaxis: SCD  Code Status: Full Code      Disposition: home eventually                     Soniya Ruff MD

## 2024-07-16 NOTE — NURSING NOTE
.Report from Sending RN:    Report From: PONCHO Atkins  Recent Surgery of Procedure: No  Baseline Level of Consciousness (LOC): A&O X3  Oxygen Use: Yes  Type: PRN  Diabetic: No  Last BP Med Given Day of Dialysis: None  Last Pain Med Given: tramadol  Lab Tests to be Obtained with Dialysis: Yes  Blood Transfusion to be Given During Dialysis: No  Available IV Access: Yes  Medications to be Administered During Dialysis: No  Continuous IV Infusion Running: No  Restraints on Currently or in the Last 24 Hours: No  Hand-Off Communication: Pt had no acute event overnight, 's, no medication given. Pt on droplet plus precaution. Travel by bed.  Dialysis Catheter Dressing: AVF  Last Dressing Change: N/A

## 2024-07-17 LAB
ALBUMIN SERPL BCP-MCNC: 3.7 G/DL (ref 3.4–5)
ANION GAP SERPL CALC-SCNC: 19 MMOL/L (ref 10–20)
BUN SERPL-MCNC: 55 MG/DL (ref 6–23)
CALCIUM SERPL-MCNC: 9.7 MG/DL (ref 8.6–10.6)
CHLORIDE SERPL-SCNC: 94 MMOL/L (ref 98–107)
CO2 SERPL-SCNC: 27 MMOL/L (ref 21–32)
CREAT SERPL-MCNC: 10.21 MG/DL (ref 0.5–1.05)
EGFRCR SERPLBLD CKD-EPI 2021: 4 ML/MIN/1.73M*2
ERYTHROCYTE [DISTWIDTH] IN BLOOD BY AUTOMATED COUNT: 19.8 % (ref 11.5–14.5)
GLUCOSE BLD MANUAL STRIP-MCNC: 109 MG/DL (ref 74–99)
GLUCOSE BLD MANUAL STRIP-MCNC: 119 MG/DL (ref 74–99)
GLUCOSE BLD MANUAL STRIP-MCNC: 199 MG/DL (ref 74–99)
GLUCOSE BLD MANUAL STRIP-MCNC: 90 MG/DL (ref 74–99)
GLUCOSE SERPL-MCNC: 68 MG/DL (ref 74–99)
HCT VFR BLD AUTO: 30.4 % (ref 36–46)
HGB BLD-MCNC: 9.3 G/DL (ref 12–16)
MCH RBC QN AUTO: 28.1 PG (ref 26–34)
MCHC RBC AUTO-ENTMCNC: 30.6 G/DL (ref 32–36)
MCV RBC AUTO: 92 FL (ref 80–100)
NRBC BLD-RTO: 0.3 /100 WBCS (ref 0–0)
PHOSPHATE SERPL-MCNC: 5.2 MG/DL (ref 2.5–4.9)
PLATELET # BLD AUTO: 242 X10*3/UL (ref 150–450)
POTASSIUM SERPL-SCNC: 5.4 MMOL/L (ref 3.5–5.3)
RBC # BLD AUTO: 3.31 X10*6/UL (ref 4–5.2)
SODIUM SERPL-SCNC: 135 MMOL/L (ref 136–145)
WBC # BLD AUTO: 5.8 X10*3/UL (ref 4.4–11.3)

## 2024-07-17 PROCEDURE — 1100000001 HC PRIVATE ROOM DAILY

## 2024-07-17 PROCEDURE — 36415 COLL VENOUS BLD VENIPUNCTURE: CPT | Performed by: INTERNAL MEDICINE

## 2024-07-17 PROCEDURE — 2500000002 HC RX 250 W HCPCS SELF ADMINISTERED DRUGS (ALT 637 FOR MEDICARE OP, ALT 636 FOR OP/ED): Performed by: STUDENT IN AN ORGANIZED HEALTH CARE EDUCATION/TRAINING PROGRAM

## 2024-07-17 PROCEDURE — 2500000001 HC RX 250 WO HCPCS SELF ADMINISTERED DRUGS (ALT 637 FOR MEDICARE OP): Performed by: STUDENT IN AN ORGANIZED HEALTH CARE EDUCATION/TRAINING PROGRAM

## 2024-07-17 PROCEDURE — 94640 AIRWAY INHALATION TREATMENT: CPT

## 2024-07-17 PROCEDURE — 84100 ASSAY OF PHOSPHORUS: CPT | Performed by: INTERNAL MEDICINE

## 2024-07-17 PROCEDURE — 2500000001 HC RX 250 WO HCPCS SELF ADMINISTERED DRUGS (ALT 637 FOR MEDICARE OP): Performed by: INTERNAL MEDICINE

## 2024-07-17 PROCEDURE — 82947 ASSAY GLUCOSE BLOOD QUANT: CPT

## 2024-07-17 PROCEDURE — 99233 SBSQ HOSP IP/OBS HIGH 50: CPT | Performed by: STUDENT IN AN ORGANIZED HEALTH CARE EDUCATION/TRAINING PROGRAM

## 2024-07-17 PROCEDURE — 99233 SBSQ HOSP IP/OBS HIGH 50: CPT | Performed by: NURSE PRACTITIONER

## 2024-07-17 PROCEDURE — 2500000001 HC RX 250 WO HCPCS SELF ADMINISTERED DRUGS (ALT 637 FOR MEDICARE OP): Performed by: NURSE PRACTITIONER

## 2024-07-17 PROCEDURE — 85027 COMPLETE CBC AUTOMATED: CPT | Performed by: INTERNAL MEDICINE

## 2024-07-17 RX ORDER — VALSARTAN 80 MG/1
80 TABLET ORAL DAILY
Status: DISCONTINUED | OUTPATIENT
Start: 2024-07-17 | End: 2024-07-18 | Stop reason: HOSPADM

## 2024-07-17 RX ORDER — CLONIDINE HYDROCHLORIDE 0.1 MG/1
0.1 TABLET ORAL EVERY 8 HOURS SCHEDULED
Status: DISCONTINUED | OUTPATIENT
Start: 2024-07-17 | End: 2024-07-18 | Stop reason: HOSPADM

## 2024-07-17 ASSESSMENT — COGNITIVE AND FUNCTIONAL STATUS - GENERAL
CLIMB 3 TO 5 STEPS WITH RAILING: A LITTLE
WALKING IN HOSPITAL ROOM: A LITTLE
DAILY ACTIVITIY SCORE: 24
DAILY ACTIVITIY SCORE: 24
WALKING IN HOSPITAL ROOM: A LITTLE
MOBILITY SCORE: 22
CLIMB 3 TO 5 STEPS WITH RAILING: A LITTLE
MOBILITY SCORE: 22

## 2024-07-17 ASSESSMENT — PAIN SCALES - GENERAL: PAINLEVEL_OUTOF10: 6

## 2024-07-17 NOTE — PROGRESS NOTES
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 She presented to the emergency department with a COVID-19 infection, complaining of shortness of breath, a dry non-productive cough, nausea, and vomiting. The patient was started on supportive treatment for nausea, vomiting, abdominal pain, and diarrhea, most of which have resolved. Although she was scheduled for discharge a few days ago, it was delayed due to her refusal of care, medications, and lack of cooperation with the medical staff despite extensive discussions. She also signed out against medical advice several times from the hemodialysis unit, with episodes of very high blood pressure, necessitating escalation of her antihypertensive medications. The primary team reinforced the importance of salt and fluid restriction and emphasized the need for her to complete full dialysis sessions and not miss any. Clonidine has been added to her blood pressure meds. Nephro subseuqenltyfeels no further UF indicating, plan to further optimize her regimen based on prior antihypertensives needs and reeval after HD tomorrow.         COVID positive  - now asymptomatic   - monintoring     Abd pain and diarrhea  - suspected to be related  to COVID  infection  - now resolved      Diffiuclt to control hypertension with hypertensive urgency  Chronic HFpEF  ESRD  -  Dialysis T/TH/SA at [facility] on [address]  - Dialysis and nephrology consulted and appreciate recs  - Continue home torsemide every day except for dialysis days  - continues to have difficult to control htn, bp >200 since last night. Pt declining additional UF for volume removal.   -  "continue carvedilol, clonidine, nifedipine. Add valsartan   - nephrology feels she is euvolemic. Will add valsatan 160mg daily, prev noted to be on entresto. If bp is improved next would deescalate the clonidine.   - will need further follow up with pcp and nephrology op for her fluid and bp management.    - change vitals to q4 hrs for closer monitoring      T2DM  - Stable, diet controlled, no home meds     COPD   - Continue home Breo Ellipta   - DuoNebs every 8 hours and as needed albuterol     Hx of DVT  -apixaban 5 mg twice daily    HLD  - continue atorva 80 daily      Scheduled outpatient appointments in system:   Future Appointments   Date Time Provider Department Center   7/17/2024 11:15 AM HD ISO CHAIR 1 CMCDialysis [facility]   7/19/2024  2:00 PM Bess Hurst RD [id number] [facility]   7/23/2024  1:40 PM Kacey Garzon MD [id number] [facility]   8/2/2024 10:30 AM Sherri Gastelum MD [id number] [facility]   8/14/2024  2:40 PM Marbella Rosales APRN-CNP [id number] [facility]   8/28/2024 11:20 AM Judy Alcaraz MD [id number] [facility]     ---------------------------------------------------------------------------------------------------  Subjective   No events. Pt does not want further HD today, wants to only go with her usual schedule. Nephrology did not feel additional UF was needed Pt denies sob, orthopnea, and primary concerned with her BP.  Reviewed prior meds and changes with her and advancing toward her prior regimen. No ha, dizziness, new nuero change snoted.      ---------------------------------------------------------------------------------------------------  Objective   Last Recorded Vitals  Blood pressure (!) 219/76, pulse 75, temperature 37 °C (98.6 °F), temperature source Temporal, resp. rate 18, height 1.397 m (4' 7\"), weight 54.4 kg (119 lb 14.9 oz), SpO2 99%.  Intake/Output last 3 Shifts:  I/O last 3 completed shifts:  In: 800 (14.7 mL/kg) [I.V.:400 (7.4 mL/kg); Other:400]  Out: 726 (13.3 mL/kg) " [Other:726]  Weight: 54.4 kg     Physical Exam  Vitals and nursing note reviewed.   Constitutional:       General: She is not in acute distress.     Appearance: Normal appearance. She is obese. She is not ill-appearing or toxic-appearing.   HENT:      Head: Normocephalic and atraumatic.      Mouth/Throat:      Mouth: Mucous membranes are moist.   Eyes:      General: No scleral icterus.     Extraocular Movements: Extraocular movements intact.      Conjunctiva/sclera: Conjunctivae normal.   Cardiovascular:      Rate and Rhythm: Normal rate and regular rhythm.      Heart sounds: S1 normal and S2 normal. No murmur heard.  Pulmonary:      Effort: Pulmonary effort is normal. No respiratory distress.      Breath sounds: No wheezing, rhonchi or rales.   Abdominal:      General: Bowel sounds are normal. There is no distension.      Palpations: Abdomen is soft.      Tenderness: There is no abdominal tenderness. There is no guarding or rebound.   Musculoskeletal:         General: No swelling or deformity.      Cervical back: Neck supple.      Comments: HD access   Skin:     General: Skin is warm and dry.      Findings: No rash.   Neurological:      General: No focal deficit present.      Mental Status: She is alert. Mental status is at baseline.   Psychiatric:         Mood and Affect: Mood normal.         Relevant Results  Lab Results   Component Value Date    WBC 6.1 07/16/2024    HGB 9.2 (L) 07/16/2024    HCT 30.1 (L) 07/16/2024    MCV 93 07/16/2024     07/16/2024      Lab Results   Component Value Date    GLUCOSE 86 07/16/2024    CALCIUM 9.1 07/16/2024     07/16/2024    K 5.1 07/16/2024    CO2 25 07/16/2024    CL 97 (L) 07/16/2024    BUN 53 (H) 07/16/2024    CREATININE 11.54 (H) 07/16/2024     Scheduled medications  acetaminophen, 650 mg, oral, TID  alum-mag hydroxide-simeth, 10 mL, oral, With meals & nightly  apixaban, 5 mg, oral, BID  atorvastatin, 80 mg, oral, Nightly  B complex-vitamin C-folic acid, 1  capsule, oral, Daily  calcium acetate, 1,334 mg, oral, TID  carvedilol, 37.5 mg, oral, BID  cloNIDine, 0.2 mg, oral, q8h BELEN  epoetin joceline or biosimilar, 10,000 Units, intravenous, Once per day on Tuesday Thursday Saturday  fluticasone, 2 spray, Each Nostril, Daily  fluticasone furoate-vilanteroL, 1 puff, inhalation, Daily  ipratropium-albuteroL, 3 mL, nebulization, q12h  isosorbide mononitrate ER, 120 mg, oral, Daily  lactobacillus acidophilus, 1 tablet, oral, 4x daily  lidocaine, 5 mL, infiltration, Once  NIFEdipine ER, 90 mg, oral, Daily before breakfast  sennosides-docusate sodium, 2 tablet, oral, BID  torsemide, 20 mg, oral, Once per day on Sunday Monday Wednesday Friday      Continuous medications     PRN medications  PRN medications: albuterol, alum-mag hydroxide-simeth, benzonatate, diphenhydrAMINE, hydrALAZINE, loperamide, ondansetron, polyethylene glycol, sodium chloride, traMADol    Al Painter MD

## 2024-07-17 NOTE — PROGRESS NOTES
"Stacey Heath is a 54 y.o. female on day 2 of admission presenting with Fluid overload.    Subjective   Pt resting in bed. Denies sob, n/v/d, fever, cough, chills, pain, chest pains, light head, dizziness, constipation       Objective     Physical Exam  Vitals and nursing note reviewed.   Cardiovascular:      Rate and Rhythm: Normal rate.   Pulmonary:      Comments: Mckinley lung sounds with minor crackles to rt base  Abdominal:      General: There is distension.      Comments: Minor dist-non-tender to palpation   Genitourinary:     Comments: States make urine  Musculoskeletal:      Comments: Ble without edema   Skin:     General: Skin is warm and dry.   Neurological:      Mental Status: She is alert and oriented to person, place, and time.   Psychiatric:         Mood and Affect: Mood normal.         Behavior: Behavior normal.         Last Recorded Vitals  Blood pressure 139/65, pulse 68, temperature 37 °C (98.6 °F), temperature source Temporal, resp. rate 18, height 1.397 m (4' 7\"), weight 54.4 kg (119 lb 14.9 oz), SpO2 99%.  Intake/Output last 3 Shifts:  I/O last 3 completed shifts:  In: 800 (14.7 mL/kg) [I.V.:400 (7.4 mL/kg); Other:400]  Out: 726 (13.3 mL/kg) [Other:726]  Weight: 54.4 kg     Relevant Results    Scheduled medications  acetaminophen, 650 mg, oral, TID  alum-mag hydroxide-simeth, 10 mL, oral, With meals & nightly  apixaban, 5 mg, oral, BID  atorvastatin, 80 mg, oral, Nightly  B complex-vitamin C-folic acid, 1 capsule, oral, Daily  calcium acetate, 1,334 mg, oral, TID  carvedilol, 37.5 mg, oral, BID  cloNIDine, 0.2 mg, oral, q8h BELEN  epoetin joceline or biosimilar, 10,000 Units, intravenous, Once per day on Tuesday Thursday Saturday  fluticasone, 2 spray, Each Nostril, Daily  fluticasone furoate-vilanteroL, 1 puff, inhalation, Daily  ipratropium-albuteroL, 3 mL, nebulization, q12h  isosorbide mononitrate ER, 120 mg, oral, Daily  lactobacillus acidophilus, 1 tablet, oral, 4x daily  lidocaine, 5 mL, " infiltration, Once  NIFEdipine ER, 90 mg, oral, Daily before breakfast  sennosides-docusate sodium, 2 tablet, oral, BID  torsemide, 20 mg, oral, Once per day on Sunday Monday Wednesday Friday  valsartan, 80 mg, oral, Daily      Continuous medications     PRN medications  PRN medications: albuterol, alum-mag hydroxide-simeth, benzonatate, diphenhydrAMINE, hydrALAZINE, loperamide, ondansetron, polyethylene glycol, sodium chloride, traMADol   Results for orders placed or performed during the hospital encounter of 07/08/24 (from the past 24 hour(s))   POCT GLUCOSE   Result Value Ref Range    POCT Glucose 139 (H) 74 - 99 mg/dL   POCT GLUCOSE   Result Value Ref Range    POCT Glucose 125 (H) 74 - 99 mg/dL   CBC   Result Value Ref Range    WBC 5.8 4.4 - 11.3 x10*3/uL    nRBC 0.3 (H) 0.0 - 0.0 /100 WBCs    RBC 3.31 (L) 4.00 - 5.20 x10*6/uL    Hemoglobin 9.3 (L) 12.0 - 16.0 g/dL    Hematocrit 30.4 (L) 36.0 - 46.0 %    MCV 92 80 - 100 fL    MCH 28.1 26.0 - 34.0 pg    MCHC 30.6 (L) 32.0 - 36.0 g/dL    RDW 19.8 (H) 11.5 - 14.5 %    Platelets 242 150 - 450 x10*3/uL   Renal Function Panel   Result Value Ref Range    Glucose 68 (L) 74 - 99 mg/dL    Sodium 135 (L) 136 - 145 mmol/L    Potassium 5.4 (H) 3.5 - 5.3 mmol/L    Chloride 94 (L) 98 - 107 mmol/L    Bicarbonate 27 21 - 32 mmol/L    Anion Gap 19 10 - 20 mmol/L    Urea Nitrogen 55 (H) 6 - 23 mg/dL    Creatinine 10.21 (H) 0.50 - 1.05 mg/dL    eGFR 4 (L) >60 mL/min/1.73m*2    Calcium 9.7 8.6 - 10.6 mg/dL    Phosphorus 5.2 (H) 2.5 - 4.9 mg/dL    Albumin 3.7 3.4 - 5.0 g/dL   POCT GLUCOSE   Result Value Ref Range    POCT Glucose 90 74 - 99 mg/dL   POCT GLUCOSE   Result Value Ref Range    POCT Glucose 199 (H) 74 - 99 mg/dL                   Assessment/Plan   Principal Problem:    Fluid overload  Active Problems:    ESRD (end stage renal disease) (Multi)    Hypervolemia, unspecified hypervolemia type    Did not Tolerate hemodialysis yesterday. Able to remove 400cc. Pt stated not  feeling well, uf off, pt signed AMA form requesting to come off after 1.5hrs of tx.      Bp (116//68), hypervolemic on exam and has stable electrolytes . K+=5.4     Outpatient Dialysis schedule:  TTS [facility]Dr Garcia (alternating IUF/HD)     Access: rt fist with +thrill/bruit- no issues - able to achieve   Lt arm midline     Anemia of ESRD:   epoetin joceline (Epogen,Procrit) injection 10,000 Units on dialysis days... current hgb 9.3... will cont to monitor     CKD-MBD Phosphate Binder:B complex-vitamin C-folic acid (Nephrocaps) capsule 1 capsule daily, calcium acetate (Phoslo) capsule 1,334 mg tid ac     Plan HD tomorrow with UF as tolerated     Renal diet      Please obtain daily standing wt (if possible)     Medication to be adjusted for ESRD      Patient to continue regular HD schedule while inpatient and to follow with the outpatient nephrologist at discharge        LILLY Rebollar-CNP

## 2024-07-17 NOTE — NURSING NOTE
Spoke with dialysis nurse, informed her patient does not want dialysis today, patient has stated she wants to go back on her normal schedule which is T-TH-SA 0706 Dr. Painter notified of patients BP and what BP meds she received this morning. Doctor aware patient declining dialysis this morning.  Per dr painter, give 0900 BP meds now.

## 2024-07-18 ENCOUNTER — APPOINTMENT (OUTPATIENT)
Dept: DIALYSIS | Facility: HOSPITAL | Age: 54
End: 2024-07-18
Payer: COMMERCIAL

## 2024-07-18 ENCOUNTER — PHARMACY VISIT (OUTPATIENT)
Dept: PHARMACY | Facility: CLINIC | Age: 54
End: 2024-07-18
Payer: MEDICARE

## 2024-07-18 VITALS
TEMPERATURE: 96.8 F | HEART RATE: 75 BPM | RESPIRATION RATE: 18 BRPM | OXYGEN SATURATION: 98 % | SYSTOLIC BLOOD PRESSURE: 199 MMHG | WEIGHT: 119.93 LBS | HEIGHT: 55 IN | BODY MASS INDEX: 27.76 KG/M2 | DIASTOLIC BLOOD PRESSURE: 82 MMHG

## 2024-07-18 PROBLEM — E87.70 HYPERVOLEMIA, UNSPECIFIED HYPERVOLEMIA TYPE: Status: RESOLVED | Noted: 2024-07-15 | Resolved: 2024-07-18

## 2024-07-18 PROBLEM — N18.6 ESRD (END STAGE RENAL DISEASE) (MULTI): Status: RESOLVED | Noted: 2022-01-10 | Resolved: 2024-07-18

## 2024-07-18 PROBLEM — E87.70 FLUID OVERLOAD: Status: RESOLVED | Noted: 2024-07-08 | Resolved: 2024-07-18

## 2024-07-18 LAB — GLUCOSE BLD MANUAL STRIP-MCNC: 70 MG/DL (ref 74–99)

## 2024-07-18 PROCEDURE — 2500000002 HC RX 250 W HCPCS SELF ADMINISTERED DRUGS (ALT 637 FOR MEDICARE OP, ALT 636 FOR OP/ED): Performed by: STUDENT IN AN ORGANIZED HEALTH CARE EDUCATION/TRAINING PROGRAM

## 2024-07-18 PROCEDURE — 2500000001 HC RX 250 WO HCPCS SELF ADMINISTERED DRUGS (ALT 637 FOR MEDICARE OP): Performed by: STUDENT IN AN ORGANIZED HEALTH CARE EDUCATION/TRAINING PROGRAM

## 2024-07-18 PROCEDURE — 8010000001 HC DIALYSIS - HEMODIALYSIS PER DAY

## 2024-07-18 PROCEDURE — 94640 AIRWAY INHALATION TREATMENT: CPT

## 2024-07-18 PROCEDURE — RXMED WILLOW AMBULATORY MEDICATION CHARGE

## 2024-07-18 PROCEDURE — 82947 ASSAY GLUCOSE BLOOD QUANT: CPT

## 2024-07-18 PROCEDURE — 2500000001 HC RX 250 WO HCPCS SELF ADMINISTERED DRUGS (ALT 637 FOR MEDICARE OP): Performed by: NURSE PRACTITIONER

## 2024-07-18 PROCEDURE — 99239 HOSP IP/OBS DSCHRG MGMT >30: CPT | Performed by: STUDENT IN AN ORGANIZED HEALTH CARE EDUCATION/TRAINING PROGRAM

## 2024-07-18 RX ORDER — CLONIDINE HYDROCHLORIDE 0.1 MG/1
0.1 TABLET ORAL 3 TIMES DAILY
Qty: 90 TABLET | Refills: 1 | Status: ON HOLD | OUTPATIENT
Start: 2024-07-18 | End: 2024-07-25

## 2024-07-18 RX ORDER — ISOSORBIDE MONONITRATE 120 MG/1
120 TABLET, EXTENDED RELEASE ORAL DAILY
Qty: 30 TABLET | Refills: 0 | Status: SHIPPED | OUTPATIENT
Start: 2024-07-19 | End: 2024-08-18

## 2024-07-18 RX ORDER — VALSARTAN 80 MG/1
80 TABLET ORAL DAILY
Qty: 30 TABLET | Refills: 1 | Status: ON HOLD | OUTPATIENT
Start: 2024-07-19 | End: 2024-07-25

## 2024-07-18 RX ORDER — NIFEDIPINE 90 MG/1
90 TABLET, EXTENDED RELEASE ORAL
Qty: 30 TABLET | Refills: 0 | Status: SHIPPED | OUTPATIENT
Start: 2024-07-19 | End: 2024-08-18

## 2024-07-18 ASSESSMENT — COGNITIVE AND FUNCTIONAL STATUS - GENERAL
MOBILITY SCORE: 22
DAILY ACTIVITIY SCORE: 24
CLIMB 3 TO 5 STEPS WITH RAILING: A LITTLE
WALKING IN HOSPITAL ROOM: A LITTLE

## 2024-07-18 ASSESSMENT — PAIN - FUNCTIONAL ASSESSMENT: PAIN_FUNCTIONAL_ASSESSMENT: NO/DENIES PAIN

## 2024-07-18 ASSESSMENT — PAIN SCALES - GENERAL
PAINLEVEL_OUTOF10: 7
PAINLEVEL_OUTOF10: 5 - MODERATE PAIN

## 2024-07-18 ASSESSMENT — PAIN SCALES - WONG BAKER: WONGBAKER_NUMERICALRESPONSE: HURTS WHOLE LOT

## 2024-07-18 NOTE — HOSPITAL COURSE
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 She presented to the emergency department with a COVID-19 infection, complaining of shortness of breath, a dry non-productive cough, nausea, and vomiting. The patient was started on supportive treatment for nausea, vomiting, abdominal pain, and diarrhea, most of which have resolved. Although she was scheduled for discharge a few days ago, it was delayed due to her refusal of care, medications, and lack of cooperation with the medical staff despite extensive discussions. She also signed out against medical advice several times from the hemodialysis unit, with episodes of very high blood pressure, necessitating escalation of her antihypertensive medications. The primary team reinforced the importance of salt and fluid restriction and emphasized the need for her to complete full dialysis sessions and not miss any. Clonidine has been added to her blood pressure meds. Nephro subseuqenlty feels no further additional UF indicating, plan to further optimize her regimen based on prior antihypertensives needs and reeval after HD tomorrow.  Pt had valsartan added, overall bp improved. After medications pt with bp in 130s in HD. Overall feeling well, no resp symptoms related to covid. Bp improved. Pt education on need for continued monitoring and follow up of her BP and likely need for further adjustments.

## 2024-07-18 NOTE — NURSING NOTE
Report from Sending RN:    Report From: Glenys Rubio RN)  Recent Surgery of Procedure: No  Baseline Level of Consciousness (LOC): a/o x 4  Oxygen Use: No  Type: none  Diabetic: Yes, 70  Last BP Med Given Day of Dialysis: coreg 37.5 mg, clonidine 0.1 mg, Isosorbide 120 mg, Nifedipine 90 mg 0802 am  Last Pain Med Given: Tylenol 650 mg 0802 am  Lab Tests to be Obtained with Dialysis: No  Blood Transfusion to be Given During Dialysis: No  Available IV Access: no  Medications to be Administered During Dialysis: No  Continuous IV Infusion Running: No  Restraints on Currently or in the Last 24 Hours: No  Hand-Off Communication: No acute overnight or morning events; vss; Pt did take morning medications; pt will not need labs; Pt is a full code; Cassidy Gonzalez RN.  Dialysis Catheter Dressing: right arm fistula  Last Dressing Change: n/a

## 2024-07-18 NOTE — NURSING NOTE
Report to Receiving RN:    Report To: Glenys ISAAC  Time Report Called: [id number]  Hand-Off Communication: Pt tolerated HD tx well with a 1.1L fluid removal. Post bp: 161/69 p: 75  Complications During Treatment: No  Ultrafiltration Treatment: Yes  Medications Administered During Dialysis: No  Blood Products Administered During Dialysis: No  Labs Sent During Dialysis: No  Heparin Drip Rate Changes: N/A  Dialysis Catheter Dressing: fistula  Last Dressing Change: n/a      Last Updated: 3:47 PM by WALT, APRIL

## 2024-07-18 NOTE — DISCHARGE INSTRUCTIONS
You were admitted for fluid overload and difficult to control hypertension. You were treated with volume removal with dialysis with assistance of the nephrology service. You antihypertensives were adjusted with better control. It is not recommended to achieve ideal blood pressure measurements given your history of hypotension with adjustments and other factors that contribute to elevated blood pressure in the hospital.  It is important that you have close follow-up for monitoring of your blood pressure as outpatient and further adjustments directed by your outpatient providers.  Please follow-up with your primary care and nephrologist to discuss your volume management and blood pressure management.  It is likely that you will need further increases in your blood pressure medications to achieve better control of your hypertension.    Is recommended that you keep a daily log of blood pressures.  A blood pressure machine can be obtained from your local pharmacy over-the-counter.  You may use blood pressures obtained at dialysis on dialysis days.  Please bring a log with you to your outpatient appointments if possible.     Please seek medical attention for any low blood pressures with systolic blood pressure less than 90 or if you are symptomatic including short of breath, chest pain, dizzy.  Please seek medical attention for severe high blood pressure greater than systolic blood pressure of 220, particularly in the setting of new headache, vision changes, chest pain, shortness of breath, neurological changes or other concerning symptoms.

## 2024-07-18 NOTE — DISCHARGE SUMMARY
Date of Admission: 7/8/2024    Date of Discharge: 7/18/2024    Discharge Diagnosis  Fluid overload in setting of ESRD, HD depdentent   Hypertensive urgency, difficult to control hypertension   COVID-19     Issues Requiring Follow-Up  Continue bp and volume management with pcp and nephrology     Discharge Meds     Your medication list        START taking these medications        Instructions Last Dose Given Next Dose Due   benzonatate 100 mg capsule  Commonly known as: Tessalon      Take 1 capsule (100 mg) by mouth 3 times a day as needed for cough. Do not crush or chew.       cloNIDine 0.1 mg tablet  Commonly known as: Catapres      Take 1 tablet (0.1 mg) by mouth 3 times a day.       Deep Sea Nasal 0.65 % nasal spray  Generic drug: sodium chloride      Administer 1 spray into each nostril 4 times a day as needed for congestion.       isosorbide mononitrate  mg 24 hr tablet  Commonly known as: Imdur  Start taking on: July 19, 2024      Take 1 tablet (120 mg) by mouth once daily. Do not crush or chew.       NIFEdipine ER 90 mg 24 hr tablet  Commonly known as: Adalat CC  Start taking on: July 19, 2024      Take 1 tablet (90 mg) by mouth once daily in the morning. Take before meals. Do not crush, chew, or split.       Renal Caps 1 mg capsule  Generic drug: B complex-vitamin C-folic acid      Take 1 capsule by mouth once daily.       valsartan 80 mg tablet  Commonly known as: Diovan  Start taking on: July 19, 2024      Take 1 tablet (80 mg) by mouth once daily.              CHANGE how you take these medications        Instructions Last Dose Given Next Dose Due   carvedilol 12.5 mg tablet  Commonly known as: Coreg  What changed:   medication strength  how much to take      Take 3 tablets (37.5 mg) by mouth 2 times a day.              CONTINUE taking these medications        Instructions Last Dose Given Next Dose Due   acetaminophen 325 mg tablet  Commonly known as: Tylenol      Take 2 tablets (650 mg) by mouth  every 4 hours if needed for mild pain (1 - 3) or moderate pain (4 - 6).       albuterol 1.25 mg/3 mL nebulizer solution      Take 3 mL (1.25 mg) by nebulization every 6 hours if needed for wheezing.       apixaban 5 mg tablet  Commonly known as: Eliquis      Take 1 tablet (5 mg) by mouth 2 times a day.       atorvastatin 80 mg tablet  Commonly known as: Lipitor      Take 1 tablet (80 mg) by mouth once daily at bedtime.       Breo Ellipta 100-25 mcg/dose inhaler  Generic drug: fluticasone furoate-vilanteroL      Inhale 1 puff once daily.       calcium acetate 667 mg capsule  Commonly known as: Phoslo      Take 2 capsules (1,334 mg) by mouth 3 times daily (morning, midday, late afternoon).       epoetin joceline 10,000 unit/mL injection  Commonly known as: Epogen,Procrit      Inject 1 mL (10,000 Units) under the skin 3 times a week.       fluticasone 50 mcg/actuation nasal spray  Commonly known as: Flonase      Administer 2 sprays into each nostril once daily. Shake gently. Before first use, prime pump. After use, clean tip and replace cap.       polyethylene glycol 17 gram/dose powder  Commonly known as: Glycolax, Miralax      Take 17 g by mouth once daily as needed (constipation).       torsemide 20 mg tablet  Commonly known as: Demadex      Take 2 tablets once daily on non-dialysis days only. Do not take on dialysis days. Do not start before July 3, 2024.              ASK your doctor about these medications        Instructions Last Dose Given Next Dose Due   traMADol 50 mg tablet  Commonly known as: Ultram  Ask about: Should I take this medication?      Take 1 tablet (50 mg) by mouth every 12 hours if needed for moderate pain (4 - 6) or severe pain (7 - 10) for up to 3 days.                 Where to Get Your Medications        These medications were sent to [facility]  [address], [address], [facility] [address]      Hours: 8AM to 6PM Mon-Fri, 8AM to 4PM Sat, 9AM to 1PM Sun Phone: [phone]    benzonatate 100 mg capsule  carvedilol 12.5 mg tablet  cloNIDine 0.1 mg tablet  Deep Sea Nasal 0.65 % nasal spray  isosorbide mononitrate  mg 24 hr tablet  NIFEdipine ER 90 mg 24 hr tablet  Renal Caps 1 mg capsule  valsartan 80 mg tablet       You can get these medications from any pharmacy    Bring a paper prescription for each of these medications  traMADol 50 mg tablet         Test Results Pending At Discharge  Pending Labs       No current pending labs.            Hospital Course  Stacey Heath is a 54-year-old female with a past medical history of end-stage renal disease secondary to hypertension and diabetes, on dialysis (Tuesdays, Thursdays, and Saturdays) via a right upper extremity arteriovenous fistula, as well as poorly controlled hypertension, type 2 diabetes mellitus, COPD, and a brachial deep vein thrombosis on [facility] since 4/14/2024. She presented to the emergency department with a COVID-19 infection, complaining of shortness of breath, a dry non-productive cough, nausea, and vomiting. The patient was started on supportive treatment for nausea, vomiting, abdominal pain, and diarrhea, most of which have resolved. Although she was scheduled for discharge a few days ago, it was delayed due to her refusal of care, medications, and lack of cooperation with the medical staff despite extensive discussions. She also signed out against medical advice several times from the hemodialysis unit, with episodes of very high blood pressure, necessitating escalation of her antihypertensive medications. The primary team reinforced the importance of salt and fluid restriction and emphasized the need for her to complete full dialysis sessions and not miss any. Clonidine has been added to her blood pressure meds. Nephro subseuqenlty feels no further additional UF indicating, plan to further optimize her regimen based on prior antihypertensives needs and reeval after HD tomorrow.  Pt had valsartan added, overall bp  improved. After medications pt with bp in 130s in HD. Overall feeling well, no resp symptoms related to covid. Bp improved. Pt education on need for continued monitoring and follow up of her BP and likely need for further adjustments.. Discharge plan of care discussed, education and counseling provided involving active problem care plan, warning signs,  risks/benefits of new medications or medication changes, follow-up care and testing.  Patient advised to follow-up with her primary care within 1 week of discharge or the next available for posthospitalization transition of care.  There was verbalized understanding and agreement with discharge care plan.    Pertinent Physical Exam At Time of Discharge  On the day of discharge, the patient reported feeling well and pain was controlled. Vitals and labs were stable. On exam: No acute distress, interactive, no increased work of breathing, regular rate and rhythm, abdomen soft/nontender, no edema.    Outpatient Follow-Up  Future Appointments   Date Time Provider Department Center   7/19/2024  2:00 PM Bess Hurst RD [id number] [facility]   7/23/2024  1:40 PM Kacey Garzon MD [id number] [facility]   8/2/2024 10:30 AM Sherri Gastelum MD [id number] [facility]   8/14/2024  2:40 PM YONIS Leahy [id number] [facility]   8/28/2024 11:20 AM Judy Alcaraz MD [id number] [facility]       Pt instructed to take all medications as prescribed.  Keep all follow-up appointments.  Contact their primary care physician with any questions or concerns that arise.  Come to the emergency department with worsening of your symptoms or any other medical emergency. Instructed that any outpatient tests that are ordered for outpatient follow up are meant to expedite outpatient work up and management, and that the results are not followed or managed by the inpatient ordering team and MUST be followed up with the outpatient primary care or outpatient specialist.  Verbal understanding and  agreement obtained to order tests for outpatient follow up purposes.       Time spent >30 minutes on discharge management.    Al Painter MD

## 2024-07-18 NOTE — CARE PLAN
The patient's goals for the shift include      The clinical goals for the shift include Patient will have BP controlled and decreased nausea during shift    Over the shift, the patient did not make progress toward the following goals. Barriers to progression include . Recommendations to address these barriers include .

## 2024-07-19 ENCOUNTER — PATIENT OUTREACH (OUTPATIENT)
Dept: CARE COORDINATION | Facility: CLINIC | Age: 54
End: 2024-07-19

## 2024-07-19 NOTE — PROGRESS NOTES
Attempted to call pt without success for our 2pm appt. Pt does appear to still be in the hospital. LMV with this RD's info. Will attempt to reach pt again at later date if she doesn't return call

## 2024-07-22 ENCOUNTER — APPOINTMENT (OUTPATIENT)
Dept: RADIOLOGY | Facility: HOSPITAL | Age: 54
End: 2024-07-22
Payer: COMMERCIAL

## 2024-07-22 ENCOUNTER — APPOINTMENT (OUTPATIENT)
Dept: CARDIOLOGY | Facility: HOSPITAL | Age: 54
End: 2024-07-22
Payer: COMMERCIAL

## 2024-07-22 ENCOUNTER — APPOINTMENT (OUTPATIENT)
Dept: NEPHROLOGY | Facility: CLINIC | Age: 54
End: 2024-07-22
Payer: COMMERCIAL

## 2024-07-22 ENCOUNTER — APPOINTMENT (OUTPATIENT)
Dept: DIALYSIS | Facility: HOSPITAL | Age: 54
End: 2024-07-22
Payer: COMMERCIAL

## 2024-07-22 ENCOUNTER — APPOINTMENT (OUTPATIENT)
Dept: PULMONOLOGY | Facility: HOSPITAL | Age: 54
End: 2024-07-22
Payer: COMMERCIAL

## 2024-07-22 ENCOUNTER — CLINICAL SUPPORT (OUTPATIENT)
Dept: EMERGENCY MEDICINE | Facility: HOSPITAL | Age: 54
End: 2024-07-22
Payer: COMMERCIAL

## 2024-07-22 ENCOUNTER — HOSPITAL ENCOUNTER (INPATIENT)
Facility: HOSPITAL | Age: 54
LOS: 3 days | Discharge: HOME | End: 2024-07-25
Attending: EMERGENCY MEDICINE | Admitting: INTERNAL MEDICINE
Payer: COMMERCIAL

## 2024-07-22 DIAGNOSIS — K59.00 CONSTIPATION, UNSPECIFIED CONSTIPATION TYPE: ICD-10-CM

## 2024-07-22 DIAGNOSIS — I50.31 ACUTE DIASTOLIC CHF (CONGESTIVE HEART FAILURE) (MULTI): ICD-10-CM

## 2024-07-22 DIAGNOSIS — I16.0 HYPERTENSIVE URGENCY: ICD-10-CM

## 2024-07-22 DIAGNOSIS — J81.0 FLASH PULMONARY EDEMA (MULTI): Primary | ICD-10-CM

## 2024-07-22 LAB
ALBUMIN SERPL BCP-MCNC: 3.6 G/DL (ref 3.4–5)
ALBUMIN SERPL BCP-MCNC: 4 G/DL (ref 3.4–5)
ALP SERPL-CCNC: 106 U/L (ref 33–110)
ALT SERPL W P-5'-P-CCNC: 27 U/L (ref 7–45)
ANION GAP BLDV CALCULATED.4IONS-SCNC: 12 MMOL/L (ref 10–25)
ANION GAP SERPL CALC-SCNC: 18 MMOL/L (ref 10–20)
ANION GAP SERPL CALC-SCNC: 19 MMOL/L (ref 10–20)
AST SERPL W P-5'-P-CCNC: 60 U/L (ref 9–39)
BASE EXCESS BLDV CALC-SCNC: 2.9 MMOL/L (ref -2–3)
BASOPHILS # BLD AUTO: 0.09 X10*3/UL (ref 0–0.1)
BASOPHILS NFR BLD AUTO: 0.9 %
BILIRUB SERPL-MCNC: 0.4 MG/DL (ref 0–1.2)
BNP SERPL-MCNC: 3019 PG/ML (ref 0–99)
BODY TEMPERATURE: 37 DEGREES CELSIUS
BUN SERPL-MCNC: 50 MG/DL (ref 6–23)
BUN SERPL-MCNC: 56 MG/DL (ref 6–23)
CA-I BLDV-SCNC: 1.23 MMOL/L (ref 1.1–1.33)
CALCIUM SERPL-MCNC: 9.5 MG/DL (ref 8.6–10.6)
CALCIUM SERPL-MCNC: 9.5 MG/DL (ref 8.6–10.6)
CARDIAC TROPONIN I PNL SERPL HS: 78 NG/L (ref 0–34)
CARDIAC TROPONIN I PNL SERPL HS: 90 NG/L (ref 0–34)
CHLORIDE BLDV-SCNC: 103 MMOL/L (ref 98–107)
CHLORIDE SERPL-SCNC: 100 MMOL/L (ref 98–107)
CHLORIDE SERPL-SCNC: 99 MMOL/L (ref 98–107)
CO2 SERPL-SCNC: 26 MMOL/L (ref 21–32)
CO2 SERPL-SCNC: 27 MMOL/L (ref 21–32)
CREAT SERPL-MCNC: 9.07 MG/DL (ref 0.5–1.05)
CREAT SERPL-MCNC: 9.62 MG/DL (ref 0.5–1.05)
EGFRCR SERPLBLD CKD-EPI 2021: 4 ML/MIN/1.73M*2
EGFRCR SERPLBLD CKD-EPI 2021: 5 ML/MIN/1.73M*2
EOSINOPHIL # BLD AUTO: 0.51 X10*3/UL (ref 0–0.7)
EOSINOPHIL NFR BLD AUTO: 4.9 %
ERYTHROCYTE [DISTWIDTH] IN BLOOD BY AUTOMATED COUNT: 19.7 % (ref 11.5–14.5)
GLUCOSE BLD MANUAL STRIP-MCNC: 151 MG/DL (ref 74–99)
GLUCOSE BLD MANUAL STRIP-MCNC: 157 MG/DL (ref 74–99)
GLUCOSE BLDV-MCNC: 139 MG/DL (ref 74–99)
GLUCOSE SERPL-MCNC: 125 MG/DL (ref 74–99)
GLUCOSE SERPL-MCNC: 137 MG/DL (ref 74–99)
HCO3 BLDV-SCNC: 30.2 MMOL/L (ref 22–26)
HCT VFR BLD AUTO: 31.1 % (ref 36–46)
HCT VFR BLD EST: 30 % (ref 36–46)
HGB BLD-MCNC: 9.6 G/DL (ref 12–16)
HGB BLDV-MCNC: 10 G/DL (ref 12–16)
IMM GRANULOCYTES # BLD AUTO: 0.05 X10*3/UL (ref 0–0.7)
IMM GRANULOCYTES NFR BLD AUTO: 0.5 % (ref 0–0.9)
INHALED O2 CONCENTRATION: 70 %
LACTATE BLDV-SCNC: 1 MMOL/L (ref 0.4–2)
LYMPHOCYTES # BLD AUTO: 1.23 X10*3/UL (ref 1.2–4.8)
LYMPHOCYTES NFR BLD AUTO: 11.8 %
MAGNESIUM SERPL-MCNC: 2.26 MG/DL (ref 1.6–2.4)
MCH RBC QN AUTO: 28.4 PG (ref 26–34)
MCHC RBC AUTO-ENTMCNC: 30.9 G/DL (ref 32–36)
MCV RBC AUTO: 92 FL (ref 80–100)
MONOCYTES # BLD AUTO: 0.47 X10*3/UL (ref 0.1–1)
MONOCYTES NFR BLD AUTO: 4.5 %
NEUTROPHILS # BLD AUTO: 8.04 X10*3/UL (ref 1.2–7.7)
NEUTROPHILS NFR BLD AUTO: 77.4 %
NRBC BLD-RTO: 0 /100 WBCS (ref 0–0)
OXYHGB MFR BLDV: 55.9 % (ref 45–75)
PCO2 BLDV: 60 MM HG (ref 41–51)
PH BLDV: 7.31 PH (ref 7.33–7.43)
PHOSPHATE SERPL-MCNC: 4.7 MG/DL (ref 2.5–4.9)
PLATELET # BLD AUTO: 243 X10*3/UL (ref 150–450)
PO2 BLDV: 41 MM HG (ref 35–45)
POTASSIUM BLDV-SCNC: 5.6 MMOL/L (ref 3.5–5.3)
POTASSIUM SERPL-SCNC: 4.9 MMOL/L (ref 3.5–5.3)
POTASSIUM SERPL-SCNC: 5.4 MMOL/L (ref 3.5–5.3)
PROT SERPL-MCNC: 7.6 G/DL (ref 6.4–8.2)
RBC # BLD AUTO: 3.38 X10*6/UL (ref 4–5.2)
SAO2 % BLDV: 57 % (ref 45–75)
SODIUM BLDV-SCNC: 140 MMOL/L (ref 136–145)
SODIUM SERPL-SCNC: 138 MMOL/L (ref 136–145)
SODIUM SERPL-SCNC: 141 MMOL/L (ref 136–145)
WBC # BLD AUTO: 10.4 X10*3/UL (ref 4.4–11.3)

## 2024-07-22 PROCEDURE — 2500000002 HC RX 250 W HCPCS SELF ADMINISTERED DRUGS (ALT 637 FOR MEDICARE OP, ALT 636 FOR OP/ED)

## 2024-07-22 PROCEDURE — 2500000004 HC RX 250 GENERAL PHARMACY W/ HCPCS (ALT 636 FOR OP/ED)

## 2024-07-22 PROCEDURE — 99221 1ST HOSP IP/OBS SF/LOW 40: CPT | Performed by: INTERNAL MEDICINE

## 2024-07-22 PROCEDURE — 85025 COMPLETE CBC W/AUTO DIFF WBC: CPT

## 2024-07-22 PROCEDURE — 84132 ASSAY OF SERUM POTASSIUM: CPT | Performed by: EMERGENCY MEDICINE

## 2024-07-22 PROCEDURE — 5A1D70Z PERFORMANCE OF URINARY FILTRATION, INTERMITTENT, LESS THAN 6 HOURS PER DAY: ICD-10-PCS | Performed by: EMERGENCY MEDICINE

## 2024-07-22 PROCEDURE — 2500000005 HC RX 250 GENERAL PHARMACY W/O HCPCS

## 2024-07-22 PROCEDURE — 84132 ASSAY OF SERUM POTASSIUM: CPT

## 2024-07-22 PROCEDURE — 2500000004 HC RX 250 GENERAL PHARMACY W/ HCPCS (ALT 636 FOR OP/ED): Mod: JG

## 2024-07-22 PROCEDURE — 84484 ASSAY OF TROPONIN QUANT: CPT

## 2024-07-22 PROCEDURE — 99233 SBSQ HOSP IP/OBS HIGH 50: CPT

## 2024-07-22 PROCEDURE — 83880 ASSAY OF NATRIURETIC PEPTIDE: CPT

## 2024-07-22 PROCEDURE — 36415 COLL VENOUS BLD VENIPUNCTURE: CPT | Performed by: EMERGENCY MEDICINE

## 2024-07-22 PROCEDURE — 94660 CPAP INITIATION&MGMT: CPT

## 2024-07-22 PROCEDURE — 82947 ASSAY GLUCOSE BLOOD QUANT: CPT

## 2024-07-22 PROCEDURE — 96374 THER/PROPH/DIAG INJ IV PUSH: CPT

## 2024-07-22 PROCEDURE — 99291 CRITICAL CARE FIRST HOUR: CPT

## 2024-07-22 PROCEDURE — 2500000001 HC RX 250 WO HCPCS SELF ADMINISTERED DRUGS (ALT 637 FOR MEDICARE OP): Performed by: STUDENT IN AN ORGANIZED HEALTH CARE EDUCATION/TRAINING PROGRAM

## 2024-07-22 PROCEDURE — 82435 ASSAY OF BLOOD CHLORIDE: CPT | Performed by: EMERGENCY MEDICINE

## 2024-07-22 PROCEDURE — 82435 ASSAY OF BLOOD CHLORIDE: CPT

## 2024-07-22 PROCEDURE — 2500000001 HC RX 250 WO HCPCS SELF ADMINISTERED DRUGS (ALT 637 FOR MEDICARE OP)

## 2024-07-22 PROCEDURE — 99285 EMERGENCY DEPT VISIT HI MDM: CPT | Performed by: EMERGENCY MEDICINE

## 2024-07-22 PROCEDURE — 5A09357 ASSISTANCE WITH RESPIRATORY VENTILATION, LESS THAN 24 CONSECUTIVE HOURS, CONTINUOUS POSITIVE AIRWAY PRESSURE: ICD-10-PCS | Performed by: EMERGENCY MEDICINE

## 2024-07-22 PROCEDURE — 36415 COLL VENOUS BLD VENIPUNCTURE: CPT

## 2024-07-22 PROCEDURE — 93005 ELECTROCARDIOGRAM TRACING: CPT

## 2024-07-22 PROCEDURE — 71045 X-RAY EXAM CHEST 1 VIEW: CPT | Performed by: STUDENT IN AN ORGANIZED HEALTH CARE EDUCATION/TRAINING PROGRAM

## 2024-07-22 PROCEDURE — 96375 TX/PRO/DX INJ NEW DRUG ADDON: CPT

## 2024-07-22 PROCEDURE — 71045 X-RAY EXAM CHEST 1 VIEW: CPT

## 2024-07-22 PROCEDURE — 83735 ASSAY OF MAGNESIUM: CPT

## 2024-07-22 PROCEDURE — 93010 ELECTROCARDIOGRAM REPORT: CPT | Performed by: EMERGENCY MEDICINE

## 2024-07-22 PROCEDURE — 1100000001 HC PRIVATE ROOM DAILY

## 2024-07-22 PROCEDURE — 94799 UNLISTED PULMONARY SVC/PX: CPT

## 2024-07-22 PROCEDURE — 93010 ELECTROCARDIOGRAM REPORT: CPT | Performed by: INTERNAL MEDICINE

## 2024-07-22 PROCEDURE — 99285 EMERGENCY DEPT VISIT HI MDM: CPT | Mod: 25

## 2024-07-22 PROCEDURE — 8010000001 HC DIALYSIS - HEMODIALYSIS PER DAY

## 2024-07-22 RX ORDER — FLUTICASONE FUROATE AND VILANTEROL 100; 25 UG/1; UG/1
1 POWDER RESPIRATORY (INHALATION)
Status: DISCONTINUED | OUTPATIENT
Start: 2024-07-22 | End: 2024-07-25 | Stop reason: HOSPADM

## 2024-07-22 RX ORDER — NITROGLYCERIN 20 MG/100ML
1000 INJECTION INTRAVENOUS ONCE
Status: DISCONTINUED | OUTPATIENT
Start: 2024-07-22 | End: 2024-07-22

## 2024-07-22 RX ORDER — NITROGLYCERIN 20 MG/100ML
INJECTION INTRAVENOUS
Status: COMPLETED
Start: 2024-07-22 | End: 2024-07-22

## 2024-07-22 RX ORDER — ISOSORBIDE MONONITRATE 60 MG/1
120 TABLET, EXTENDED RELEASE ORAL DAILY
Status: DISCONTINUED | OUTPATIENT
Start: 2024-07-22 | End: 2024-07-25 | Stop reason: HOSPADM

## 2024-07-22 RX ORDER — CALCIUM ACETATE 667 MG/1
1334 CAPSULE ORAL
Status: DISCONTINUED | OUTPATIENT
Start: 2024-07-22 | End: 2024-07-25 | Stop reason: HOSPADM

## 2024-07-22 RX ORDER — CLONIDINE HYDROCHLORIDE 0.1 MG/1
0.1 TABLET ORAL ONCE
Status: COMPLETED | OUTPATIENT
Start: 2024-07-22 | End: 2024-07-22

## 2024-07-22 RX ORDER — POLYETHYLENE GLYCOL 3350 17 G/17G
17 POWDER, FOR SOLUTION ORAL DAILY PRN
Status: DISCONTINUED | OUTPATIENT
Start: 2024-07-22 | End: 2024-07-23 | Stop reason: SDUPTHER

## 2024-07-22 RX ORDER — FLUTICASONE PROPIONATE 50 MCG
2 SPRAY, SUSPENSION (ML) NASAL DAILY
Status: DISCONTINUED | OUTPATIENT
Start: 2024-07-22 | End: 2024-07-25 | Stop reason: HOSPADM

## 2024-07-22 RX ORDER — ACETAMINOPHEN 325 MG/1
650 TABLET ORAL ONCE
Status: COMPLETED | OUTPATIENT
Start: 2024-07-22 | End: 2024-07-22

## 2024-07-22 RX ORDER — VALSARTAN 80 MG/1
80 TABLET ORAL DAILY
Status: DISCONTINUED | OUTPATIENT
Start: 2024-07-22 | End: 2024-07-23

## 2024-07-22 RX ORDER — FUROSEMIDE 10 MG/ML
80 INJECTION INTRAMUSCULAR; INTRAVENOUS ONCE
Status: COMPLETED | OUTPATIENT
Start: 2024-07-22 | End: 2024-07-22

## 2024-07-22 RX ORDER — NITROGLYCERIN 0.4 MG/1
0.4 TABLET SUBLINGUAL ONCE
Status: COMPLETED | OUTPATIENT
Start: 2024-07-22 | End: 2024-07-22

## 2024-07-22 RX ORDER — ATORVASTATIN CALCIUM 80 MG/1
80 TABLET, FILM COATED ORAL NIGHTLY
Status: DISCONTINUED | OUTPATIENT
Start: 2024-07-22 | End: 2024-07-25 | Stop reason: HOSPADM

## 2024-07-22 RX ORDER — BENZONATATE 100 MG/1
100 CAPSULE ORAL 3 TIMES DAILY PRN
Status: DISCONTINUED | OUTPATIENT
Start: 2024-07-22 | End: 2024-07-25 | Stop reason: HOSPADM

## 2024-07-22 RX ORDER — NITROGLYCERIN 20 MG/100ML
5-400 INJECTION INTRAVENOUS CONTINUOUS
Status: DISCONTINUED | OUTPATIENT
Start: 2024-07-22 | End: 2024-07-22

## 2024-07-22 RX ORDER — NITROGLYCERIN 0.4 MG/1
TABLET SUBLINGUAL
Status: COMPLETED
Start: 2024-07-22 | End: 2024-07-22

## 2024-07-22 RX ORDER — NIFEDIPINE 90 MG/1
90 TABLET, EXTENDED RELEASE ORAL
Status: DISCONTINUED | OUTPATIENT
Start: 2024-07-23 | End: 2024-07-25 | Stop reason: HOSPADM

## 2024-07-22 RX ORDER — LIDOCAINE HYDROCHLORIDE 10 MG/ML
5 INJECTION INFILTRATION; PERINEURAL ONCE
Status: DISCONTINUED | OUTPATIENT
Start: 2024-07-22 | End: 2024-07-23

## 2024-07-22 RX ORDER — CLONIDINE HYDROCHLORIDE 0.1 MG/1
0.1 TABLET ORAL 3 TIMES DAILY
Status: DISCONTINUED | OUTPATIENT
Start: 2024-07-22 | End: 2024-07-24

## 2024-07-22 RX ORDER — HYDRALAZINE HYDROCHLORIDE 25 MG/1
25 TABLET, FILM COATED ORAL 3 TIMES DAILY PRN
Status: DISCONTINUED | OUTPATIENT
Start: 2024-07-22 | End: 2024-07-24

## 2024-07-22 RX ORDER — OXYCODONE HYDROCHLORIDE 5 MG/1
5 TABLET ORAL ONCE
Status: COMPLETED | OUTPATIENT
Start: 2024-07-22 | End: 2024-07-22

## 2024-07-22 RX ORDER — ACETAMINOPHEN 325 MG/1
650 TABLET ORAL EVERY 6 HOURS PRN
Status: DISCONTINUED | OUTPATIENT
Start: 2024-07-22 | End: 2024-07-25 | Stop reason: HOSPADM

## 2024-07-22 RX ORDER — NIFEDIPINE 30 MG/1
90 TABLET, FILM COATED, EXTENDED RELEASE ORAL ONCE
Status: COMPLETED | OUTPATIENT
Start: 2024-07-22 | End: 2024-07-22

## 2024-07-22 RX ORDER — GUAIFENESIN 600 MG/1
600 TABLET, EXTENDED RELEASE ORAL 2 TIMES DAILY PRN
Status: DISCONTINUED | OUTPATIENT
Start: 2024-07-22 | End: 2024-07-25 | Stop reason: HOSPADM

## 2024-07-22 RX ORDER — ALBUTEROL SULFATE 0.83 MG/ML
1.25 SOLUTION RESPIRATORY (INHALATION) EVERY 6 HOURS PRN
Status: DISCONTINUED | OUTPATIENT
Start: 2024-07-22 | End: 2024-07-25 | Stop reason: HOSPADM

## 2024-07-22 RX ORDER — CARVEDILOL 12.5 MG/1
12.5 TABLET ORAL ONCE
Status: COMPLETED | OUTPATIENT
Start: 2024-07-22 | End: 2024-07-22

## 2024-07-22 RX ORDER — CLONIDINE HYDROCHLORIDE 0.1 MG/1
0.1 TABLET ORAL 3 TIMES DAILY
Status: DISCONTINUED | OUTPATIENT
Start: 2024-07-22 | End: 2024-07-22

## 2024-07-22 RX ORDER — POLYETHYLENE GLYCOL 3350 17 G/17G
17 POWDER, FOR SOLUTION ORAL ONCE
Status: COMPLETED | OUTPATIENT
Start: 2024-07-22 | End: 2024-07-22

## 2024-07-22 SDOH — SOCIAL STABILITY: SOCIAL INSECURITY: ARE YOU OR HAVE YOU BEEN THREATENED OR ABUSED PHYSICALLY, EMOTIONALLY, OR SEXUALLY BY ANYONE?: NO

## 2024-07-22 SDOH — SOCIAL STABILITY: SOCIAL INSECURITY: ABUSE: ADULT

## 2024-07-22 SDOH — SOCIAL STABILITY: SOCIAL INSECURITY: HAVE YOU HAD ANY THOUGHTS OF HARMING ANYONE ELSE?: NO

## 2024-07-22 SDOH — SOCIAL STABILITY: SOCIAL INSECURITY: DO YOU FEEL ANYONE HAS EXPLOITED OR TAKEN ADVANTAGE OF YOU FINANCIALLY OR OF YOUR PERSONAL PROPERTY?: NO

## 2024-07-22 SDOH — SOCIAL STABILITY: SOCIAL INSECURITY: DO YOU FEEL UNSAFE GOING BACK TO THE PLACE WHERE YOU ARE LIVING?: NO

## 2024-07-22 SDOH — ECONOMIC STABILITY: INCOME INSECURITY: IN THE LAST 12 MONTHS, WAS THERE A TIME WHEN YOU WERE NOT ABLE TO PAY THE MORTGAGE OR RENT ON TIME?: NO

## 2024-07-22 SDOH — SOCIAL STABILITY: SOCIAL INSECURITY: HAVE YOU HAD THOUGHTS OF HARMING ANYONE ELSE?: NO

## 2024-07-22 SDOH — ECONOMIC STABILITY: INCOME INSECURITY: HOW HARD IS IT FOR YOU TO PAY FOR THE VERY BASICS LIKE FOOD, HOUSING, MEDICAL CARE, AND HEATING?: NOT VERY HARD

## 2024-07-22 SDOH — SOCIAL STABILITY: SOCIAL INSECURITY: HAS ANYONE EVER THREATENED TO HURT YOUR FAMILY OR YOUR PETS?: NO

## 2024-07-22 SDOH — ECONOMIC STABILITY: HOUSING INSECURITY: AT ANY TIME IN THE PAST 12 MONTHS, WERE YOU HOMELESS OR LIVING IN A SHELTER (INCLUDING NOW)?: NO

## 2024-07-22 SDOH — SOCIAL STABILITY: SOCIAL INSECURITY: ARE THERE ANY APPARENT SIGNS OF INJURIES/BEHAVIORS THAT COULD BE RELATED TO ABUSE/NEGLECT?: NO

## 2024-07-22 SDOH — HEALTH STABILITY: PHYSICAL HEALTH: ON AVERAGE, HOW MANY MINUTES DO YOU ENGAGE IN EXERCISE AT THIS LEVEL?: 60 MIN

## 2024-07-22 SDOH — SOCIAL STABILITY: SOCIAL INSECURITY: DOES ANYONE TRY TO KEEP YOU FROM HAVING/CONTACTING OTHER FRIENDS OR DOING THINGS OUTSIDE YOUR HOME?: NO

## 2024-07-22 SDOH — ECONOMIC STABILITY: HOUSING INSECURITY: IN THE PAST 12 MONTHS, HOW MANY TIMES HAVE YOU MOVED WHERE YOU WERE LIVING?: 1

## 2024-07-22 SDOH — HEALTH STABILITY: PHYSICAL HEALTH: ON AVERAGE, HOW MANY DAYS PER WEEK DO YOU ENGAGE IN MODERATE TO STRENUOUS EXERCISE (LIKE A BRISK WALK)?: 4 DAYS

## 2024-07-22 ASSESSMENT — ACTIVITIES OF DAILY LIVING (ADL)
HEARING - RIGHT EAR: FUNCTIONAL
WALKS IN HOME: INDEPENDENT
HEARING - LEFT EAR: FUNCTIONAL
DRESSING YOURSELF: INDEPENDENT
BATHING: INDEPENDENT
TOILETING: INDEPENDENT
ADEQUATE_TO_COMPLETE_ADL: YES
FEEDING YOURSELF: INDEPENDENT
PATIENT'S MEMORY ADEQUATE TO SAFELY COMPLETE DAILY ACTIVITIES?: YES
JUDGMENT_ADEQUATE_SAFELY_COMPLETE_DAILY_ACTIVITIES: YES
GROOMING: INDEPENDENT

## 2024-07-22 ASSESSMENT — COGNITIVE AND FUNCTIONAL STATUS - GENERAL
CLIMB 3 TO 5 STEPS WITH RAILING: A LITTLE
STANDING UP FROM CHAIR USING ARMS: A LITTLE
EATING MEALS: A LITTLE
TURNING FROM BACK TO SIDE WHILE IN FLAT BAD: A LITTLE
DRESSING REGULAR LOWER BODY CLOTHING: A LITTLE
EATING MEALS: A LITTLE
DAILY ACTIVITIY SCORE: 18
PERSONAL GROOMING: A LITTLE
DAILY ACTIVITIY SCORE: 18
STANDING UP FROM CHAIR USING ARMS: A LITTLE
CLIMB 3 TO 5 STEPS WITH RAILING: A LITTLE
HELP NEEDED FOR BATHING: A LITTLE
MOVING FROM LYING ON BACK TO SITTING ON SIDE OF FLAT BED WITH BEDRAILS: A LITTLE
WALKING IN HOSPITAL ROOM: A LITTLE
DRESSING REGULAR UPPER BODY CLOTHING: A LITTLE
MOVING TO AND FROM BED TO CHAIR: A LITTLE
TURNING FROM BACK TO SIDE WHILE IN FLAT BAD: A LITTLE
HELP NEEDED FOR BATHING: A LITTLE
PATIENT BASELINE BEDBOUND: NO
DRESSING REGULAR LOWER BODY CLOTHING: A LITTLE
DRESSING REGULAR UPPER BODY CLOTHING: A LITTLE
MOVING FROM LYING ON BACK TO SITTING ON SIDE OF FLAT BED WITH BEDRAILS: A LITTLE
TOILETING: A LITTLE
MOBILITY SCORE: 18
MOBILITY SCORE: 18
PERSONAL GROOMING: A LITTLE
MOVING TO AND FROM BED TO CHAIR: A LITTLE
WALKING IN HOSPITAL ROOM: A LITTLE
TOILETING: A LITTLE

## 2024-07-22 ASSESSMENT — LIFESTYLE VARIABLES
HOW OFTEN DO YOU HAVE 6 OR MORE DRINKS ON ONE OCCASION: NEVER
SKIP TO QUESTIONS 9-10: 1
EVER FELT BAD OR GUILTY ABOUT YOUR DRINKING: NO
HAVE YOU EVER FELT YOU SHOULD CUT DOWN ON YOUR DRINKING: NO
TOTAL SCORE: 0
SUBSTANCE_ABUSE_PAST_12_MONTHS: NO
PRESCIPTION_ABUSE_PAST_12_MONTHS: NO
HOW MANY STANDARD DRINKS CONTAINING ALCOHOL DO YOU HAVE ON A TYPICAL DAY: PATIENT DOES NOT DRINK
HAVE PEOPLE ANNOYED YOU BY CRITICIZING YOUR DRINKING: NO
AUDIT-C TOTAL SCORE: 0
EVER HAD A DRINK FIRST THING IN THE MORNING TO STEADY YOUR NERVES TO GET RID OF A HANGOVER: NO
AUDIT-C TOTAL SCORE: 0
HOW OFTEN DO YOU HAVE A DRINK CONTAINING ALCOHOL: NEVER

## 2024-07-22 ASSESSMENT — PAIN SCALES - GENERAL
PAINLEVEL_OUTOF10: 7
PAINLEVEL_OUTOF10: 5 - MODERATE PAIN
PAINLEVEL_OUTOF10: 3
PAINLEVEL_OUTOF10: 10 - WORST POSSIBLE PAIN
PAINLEVEL_OUTOF10: 6
PAINLEVEL_OUTOF10: 0 - NO PAIN

## 2024-07-22 ASSESSMENT — PAIN DESCRIPTION - LOCATION
LOCATION: HEAD
LOCATION: HEAD

## 2024-07-22 ASSESSMENT — ENCOUNTER SYMPTOMS
COUGH: 1
SHORTNESS OF BREATH: 1
WHEEZING: 1
HEADACHES: 1

## 2024-07-22 ASSESSMENT — COLUMBIA-SUICIDE SEVERITY RATING SCALE - C-SSRS
5. HAVE YOU STARTED TO WORK OUT OR WORKED OUT THE DETAILS OF HOW TO KILL YOURSELF? DO YOU INTEND TO CARRY OUT THIS PLAN?: NO
6. HAVE YOU EVER DONE ANYTHING, STARTED TO DO ANYTHING, OR PREPARED TO DO ANYTHING TO END YOUR LIFE?: NO
4. HAVE YOU HAD THESE THOUGHTS AND HAD SOME INTENTION OF ACTING ON THEM?: NO
2. HAVE YOU ACTUALLY HAD ANY THOUGHTS OF KILLING YOURSELF?: NO
1. IN THE PAST MONTH, HAVE YOU WISHED YOU WERE DEAD OR WISHED YOU COULD GO TO SLEEP AND NOT WAKE UP?: NO

## 2024-07-22 ASSESSMENT — PAIN - FUNCTIONAL ASSESSMENT: PAIN_FUNCTIONAL_ASSESSMENT: 0-10

## 2024-07-22 ASSESSMENT — PAIN DESCRIPTION - PROGRESSION: CLINICAL_PROGRESSION: GRADUALLY IMPROVING

## 2024-07-22 NOTE — H&P
Medical Intensive Care - History and Physical   Subjective    Stacey Heath is a 54 y.o. year old female patient admitted on 7/22/2024 with following ICU needs: hypertensive emergency and possible need of urgent hemodialysis/IUF    HPI:  Stacey Heath is a 53 y.o. 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      Patient reports she sleeping feeling short of breath and suddenly woke up and use her inhalers but didn't help at all, and her SOB was worse when she was laying flat, no chest pain or N/V that time. she recently admitted [place] [facility] because of hypertensive emergency and discharged on 7/18 .she is compliance with dialysis, with last session on Saturday.      In the ED, patient's initial BP was 250/120, HR 95, RR 26, O2 75% on RA. Patient was started on BiPAP and Nitrohlycerin drip 300 mcg/min with goal blood pressure 140-150 systolic. On point-of-care ultrasound had diffuse bilateral b lines . ECG showed deep T wave inversion in inf leads and v5-v6, lactate 1, ph 7.30 with respiratory acidosis and chronic metabolic compensation, started patient on home bp medications. Troponin downtrending from 90 to 78.  BNP elevated at 3000 although down from patient's baseline consistent with CHF and fluid overload. Chest x-ray showed bilateral pulmonary edema but improved from previous imaging.  Patient was able to be weaned down to 2 L nasal cannula while in the emergency department however still requires nitro drip for ongoing blood pressure control.  After 80 mg Lasix in the emergency department patient failed to produce adequate urine output for us today.  I think she would benefit from emergent dialysis today for fluid removal to improve her ongoing respiratory and hemodynamic status  Upon admission to MICU, / 90 , HR 85, Sat  o2 100, on 2 lit NC. No cp, no n/v and she stated she feels better.      Review of Systems:  Review of Systems   Respiratory:  Positive for cough, shortness of breath and wheezing.    Neurological:  Positive for headaches.          Meds    Home medications:  Current Outpatient Medications   Medication Instructions    acetaminophen (TYLENOL) 650 mg, oral, Every 4 hours PRN    albuterol 1.25 mg, nebulization, Every 6 hours PRN    apixaban (ELIQUIS) 5 mg, oral, 2 times daily    atorvastatin (LIPITOR) 80 mg, oral, Nightly    B complex-vitamin C-folic acid (Nephrocaps) 1 mg capsule 1 capsule, oral, Daily    benzonatate (TESSALON) 100 mg, oral, 3 times daily PRN, Do not crush or chew.    calcium acetate (PHOSLO) 1,334 mg, oral, 3 times daily (morning, midday, late afternoon)    carvedilol (COREG) 37.5 mg, oral, 2 times daily    cloNIDine (CATAPRES) 0.1 mg, oral, 3 times daily    epoetin joceline (EPOGEN,PROCRIT) 10,000 Units, subcutaneous, 3 times weekly    fluticasone (Flonase) 50 mcg/actuation nasal spray 2 sprays, Each Nostril, Daily, Shake gently. Before first use, prime pump. After use, clean tip and replace cap.    fluticasone furoate-vilanteroL (Breo Ellipta) 100-25 mcg/dose inhaler 1 puff, inhalation, Daily RT    isosorbide mononitrate ER (IMDUR) 120 mg, oral, Daily, Do not crush or chew.    NIFEdipine ER (ADALAT CC) 90 mg, oral, Daily before breakfast, Do not crush, chew, or split.    polyethylene glycol (GLYCOLAX, MIRALAX) 17 g, oral, Daily PRN    sodium chloride (Ocean) 0.65 % nasal spray 1 spray, Each Nostril, 4 times daily PRN    torsemide (Demadex) 20 mg tablet Take 2 tablets once daily on non-dialysis days only. Do not take on dialysis days. Do not start before July 3, 2024.    valsartan (DIOVAN) 80 mg, oral, Daily        Inpatient medications:  Scheduled medications  apixaban, 5 mg, oral, BID  atorvastatin, 80 mg, oral, Nightly  B complex-vitamin C-folic acid, 1 capsule, oral, Daily  calcium acetate, 1,334 mg,  oral, TID  carvedilol, 37.5 mg, oral, BID  cloNIDine, 0.1 mg, oral, TID  fluticasone, 2 spray, Each Nostril, Daily  fluticasone furoate-vilanteroL, 1 puff, inhalation, Daily  isosorbide mononitrate ER, 120 mg, oral, Daily  lidocaine, 5 mL, infiltration, Once  [START ON 7/23/2024] NIFEdipine ER, 90 mg, oral, Daily before breakfast  nitroglycerin in 5 % dextrose, 1,000 mcg, intravenous, Once  sodium zirconium cyclosilicate, 10 g, oral, Once  [Held by provider] valsartan, 80 mg, oral, Daily      Continuous medications  nitroglycerin, 5-400 mcg/min, Last Rate: Stopped (07/22/24 1215)      PRN medications  PRN medications: acetaminophen, albuterol, benzonatate, guaiFENesin, oxygen, polyethylene glycol, sodium chloride     Objective    Blood pressure 133/56, pulse 80, temperature 36.3 °C (97.3 °F), temperature source Temporal, resp. rate 15, weight 49.9 kg (110 lb), SpO2 95%.     Physical Exam  Neck:      Comments: JVD elevated  Cardiovascular:      Rate and Rhythm: Normal rate and regular rhythm.      Pulses: Normal pulses.      Heart sounds: Normal heart sounds.      Comments: No murmur  Pulmonary:      Effort: Pulmonary effort is normal.      Breath sounds: Rales present.      Comments: Diminished lung sounds on both lower lobes  Abdominal:      Palpations: Abdomen is soft.   Musculoskeletal:      Comments: No LE edema    Skin:     General: Skin is warm.   Neurological:      Mental Status: She is oriented to person, place, and time. Mental status is at baseline.   Psychiatric:         Mood and Affect: Mood normal.         Behavior: Behavior normal.         Thought Content: Thought content normal.         Judgment: Judgment normal.            Intake/Output Summary (Last 24 hours) at 7/22/2024 1309  Last data filed at 7/22/2024 1215  Gross per 24 hour   Intake 903 ml   Output --   Net 903 ml     Labs:   Results from last 72 hours   Lab Units 07/22/24  0255   SODIUM mmol/L 141   POTASSIUM mmol/L 5.4*   CHLORIDE mmol/L 100    CO2 mmol/L 27   BUN mg/dL 50*   CREATININE mg/dL 9.07*   GLUCOSE mg/dL 125*   CALCIUM mg/dL 9.5   ANION GAP mmol/L 19   EGFR mL/min/1.73m*2 5*      Results from last 72 hours   Lab Units 07/22/24  0255   WBC AUTO x10*3/uL 10.4   HEMOGLOBIN g/dL 9.6*   HEMATOCRIT % 31.1*   PLATELETS AUTO x10*3/uL 243   NEUTROS PCT AUTO % 77.4   LYMPHS PCT AUTO % 11.8   MONOS PCT AUTO % 4.5   EOS PCT AUTO % 4.9        Micro/ID:     Lab Results   Component Value Date    URINECULTURE NO SIGNIFICANT GROWTH. 07/09/2023    BLOODCULT  08/21/2023     No Growth at 1 days~No Growth at 2 days~No Growth at 3 days~NO GROWTH at 4 days - FINAL REPORT       Summary of Key Imaging Results  CXR 7/22/24  The heart is enlarged. The aorta is atherosclerotic. The pulmonary  vasculature is congested centrally and indistinct peripherally, with  interlobular septal thickening and indistinct mid to lower lung  consolidative opacities consistent with pulmonary edema. Small left  pleural effusion.    Assessment and Plan     Assessment:  Stacey Heath is a 54 y.o. year old female patient with PMH ESRD 2/2 HTN and diabetes on dialysis T/Th/Sat via RUE AVF (last complete session 7/20/24) and poorly controlled hypertension, DMT2, COPD, brachial DVT on Eliquis (4/14/2024) admitted to MICU for hypertensive emergency and pulmonary edema.     Mechanical Ventilation: none  Sedation/Analgesia:  none  Restraints: no    Plan:  NEUROLOGY/PSYCH:  #Headache  -secondary to hypertension vs nitroglycerin gtt  Plan:  -wean nitroglycerin gtt as tolerated      CARDIOVASCULAR:  #Hypertensive emergency  #Pulmonary edema  -Presented with /120 and SOB    Plan:   -nitroglycerin gtt, wean as tolerated  -continue home regimen  -Needs medication reconciliation and optimization of BP regimen to limit readmissions to the hospital          #HFpEF  -Echo 6/2023 with EF 55-60%  -Limited with GDMT given ESRD   Plan:  -Toresmide on non-dialysis days  -medication reconciliation and  optimization     #Hx of Brachial DVT  -Continue Eliquis 5 mg BID    PULMONARY:  #AHRF 2/2 volume overload/moderate pulmonary edema  -plan for dialysis overnight  -nephrology consulted  -goal O2 sat >94%, titrate oxygen as needed     #COPD ( 7/2022: FEV1/FVC 75.22. Mild obstructive pattern)  -following with pulm fellow clinic at ,  Plan:   -continue home albuterol prn and breo ellipta    RENAL/GENITOURINARY:  #ESRD on HD TTS   #Hypertensive emergency  #Flash pulmonary edema   -Access: RUE AVF   -HD unit: [facility]  -Primary nephrologist: Dr Garcia  -EDW: Uncertain, 54 kg from outpatient record  Plan:   -Emergent IUF today  -Nephrology consulted and aware  -Electrolyte check post dialysis     GASTROENTEROLOGY:  ZAIN    ENDOCRINOLOGY:  #T2DM c/b retinopathy, nephropathy and neuropathy  -no medications  -last A1c 5.2 (6/29/24)  Plan:   Continue with her current diet     HEMATOLOGY:  #Anemia of chronic disease I/s/o ESRD  -on Epo outpatient   Plan:  -continue on home medication and EPO outpatient    MUSCULOSKELETAL/ SKIN:  ZAIN    INFECTIOUS DISEASE:  ZAIN    ICU Check List     FEN  Fluids: restricted   Electrolytes: prn K>4, mag>2  Nutrition: renal diet    DVT ppx: none  GI ppx: none   Bowel care: mirilax  Access: RUE AVF  Lines: PIV  Code: Full Code  confirmed on admission to MICU  HPOA: Hai Pinedo (spouse) [phone]       Carlos Little MD   07/22/24 at 1:09 PM     Disclaimer: Documentation completed with the information available at the time of input. The times in the chart may not be reflective of actual patient care times, interventions, or procedures. Documentation occurs after the physical care of the patient.

## 2024-07-22 NOTE — CONSULTS
NEPHROLOGY NEW CONSULT NOTE   Stacey Heath   54 y.o.    @WT@  MRN/Room: [id number]/16/16-A    Reason for consult: ESRD    HPI:  Stacey Heath is a 54-year-old female with a past medical history of ESRD TTS, DM, HTN who presents for shortness of breath. Admitted to MICU for hypertensive urgency.   Pt discharged 7/18 for SOB and poorly controlled HTN. She had valsartan and clonidine added to her regimen. Her weight is down to 51kg with DW listed as 55kg at HD unit. Pt had HD Saturday with 1L UF removed.     In the ED, /120. She was started on bipap and nitroglycerin gtt. CXR with mild pulmonary edema. Given lasix 80 IV. Transferred to MICU. She was started on her oral BP medications and weaned off nitroglyercin gtt and placed on 2L NC.     Plan for IUF however after starting with 400ml UF removed her BP dropped to sBP 80s. Pt appeared comfortable so UF treatment was stopped.             In The ER: /72   Pulse 80   Temp 36.3 °C (97.3 °F) (Temporal)   Resp 21   Wt 49.9 kg (110 lb)   SpO2 90%   BMI 25.57 kg/m²      Past Medical History:   Diagnosis Date    Bacteremia due to methicillin resistant Staphylococcus aureus 07/08/2024    Cardiac/pericardial tamponade ([facility]) 01/20/2024    CHF (congestive heart failure) (Multi)     COPD (chronic obstructive pulmonary disease) (Multi)     Coronary artery disease     Disorder of sweat glands 02/25/2023    Dry eye syndrome of bilateral lacrimal glands 03/07/2017    Dry eyes    ESRD (end stage renal disease) (Multi)     Essential (primary) hypertension 12/27/2022    Hypertension    History of acute pancreatitis 12/21/2020    History of acute pancreatitis    Low grade squamous intraepithelial lesion (LGSIL) on cervicovaginal cytologic smear 02/25/2023    Migraines     History of migraine    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    Type 2 myocardial infarction (Multi) 01/20/2024      Past Surgical History:   Procedure Laterality Date    APPENDECTOMY   03/07/2017    Appendectomy    CT ABDOMEN ANGIOGRAM W AND/OR WO IV CONTRAST  4/23/2023    CT ABDOMEN ANGIOGRAM W AND/OR WO IV CONTRAST [facility] CT    OTHER SURGICAL HISTORY  03/07/2017    Cystoscopy With Pyeloscopy With Removal Of Calculus    OTHER SURGICAL HISTORY  03/07/2017    Anoscopy For Polyp Removal    OTHER SURGICAL HISTORY  12/08/2021    Arteriovenous fistula creation procedure    OTHER SURGICAL HISTORY  12/08/2021    Dialysis tunneled catheter placement    TUBAL LIGATION  03/07/2017    Tubal Ligation      Family History   Problem Relation Name Age of Onset    Other (Cerebrovascular Accident) Father      Heart failure Paternal Grandmother      Breast cancer Paternal Grandmother      Other (Primary Cervical Cancer) Paternal Grandmother      Diabetes Paternal Grandfather      Breast cancer Father's Sister      Ovarian cancer Father's Sister       Social History     Socioeconomic History    Marital status: Single     Spouse name: Not on file    Number of children: Not on file    Years of education: Not on file    Highest education level: Not on file   Occupational History    Not on file   Tobacco Use    Smoking status: Former     Types: Cigarettes    Smokeless tobacco: Never   Vaping Use    Vaping status: Never Used   Substance and Sexual Activity    Alcohol use: Not Currently    Drug use: Not Currently     Types: Cocaine, Marijuana    Sexual activity: Defer   Other Topics Concern    Not on file   Social History Narrative    Not on file     Social Determinants of Health     Financial Resource Strain: Low Risk  (7/22/2024)    Overall Financial Resource Strain (CARDIA)     Difficulty of Paying Living Expenses: Not very hard   Recent Concern: Financial Resource Strain - High Risk (5/29/2024)    Overall Financial Resource Strain (CARDIA)     Difficulty of Paying Living Expenses: Very hard   Food Insecurity: No Food Insecurity (7/15/2024)    Hunger Vital Sign     Worried About Running Out of Food in the Last Year: Never  true     Ran Out of Food in the Last Year: Never true   Transportation Needs: No Transportation Needs (7/22/2024)    PRAPARE - Transportation     Lack of Transportation (Medical): No     Lack of Transportation (Non-Medical): No   Physical Activity: Sufficiently Active (7/22/2024)    Exercise Vital Sign     Days of Exercise per Week: 4 days     Minutes of Exercise per Session: 60 min   Stress: No Stress Concern Present (7/15/2024)    [employer] - Occupational Stress Questionnaire     Feeling of Stress : Only a little   Social Connections: Moderately Integrated (7/15/2024)    Social Connection and Isolation Panel [NHANES]     Frequency of Communication with Friends and Family: More than three times a week     Frequency of Social Gatherings with Friends and Family: Once a week     Attends Yazidi Services: More than 4 times per year     Active Member of Clubs or Organizations: No     Attends Club or Organization Meetings: Not on file     Marital Status: Living with partner   Intimate Partner Violence: Not At Risk (7/15/2024)    Humiliation, Afraid, Rape, and Kick questionnaire     Fear of Current or Ex-Partner: No     Emotionally Abused: No     Physically Abused: No     Sexually Abused: No   Housing Stability: Low Risk  (7/22/2024)    Housing Stability Vital Sign     Unable to Pay for Housing in the Last Year: No     Number of Times Moved in the Last Year: 1     Homeless in the Last Year: No       Allergies   Allergen Reactions    Iodine Hives, Itching and Unknown    Bioflavonoids Swelling    Codeine Itching, Hives and Unknown     Tolerates percocet   Tolerates percocet    Tolerates percocet    Flowers Itching    Hydromorphone Itching    Shellfish Containing Products Swelling     SEAFOOD        Medications Prior to Admission   Medication Sig Dispense Refill Last Dose    albuterol 1.25 mg/3 mL nebulizer solution Take 3 mL (1.25 mg) by nebulization every 6 hours if needed for wheezing. 90  mL 11 Past Month    albuterol sulfate (Proair Digihaler) 90 mcg/actuation aero powdr breath act w/sensor inhaler Inhale 2 puffs 2 times a day as needed for wheezing or shortness of breath.   Past Month    atorvastatin (Lipitor) 80 mg tablet Take 1 tablet (80 mg) by mouth once daily at bedtime. 30 tablet 0 Past Week    B complex-vitamin C-folic acid (Nephrocaps) 1 mg capsule Take 1 capsule by mouth once daily. 30 capsule 0 Past Week    benzonatate (Tessalon) 100 mg capsule Take 1 capsule (100 mg) by mouth 3 times a day as needed for cough. Do not crush or chew. 20 capsule 0 Past Week    calcium acetate (Phoslo) 667 mg capsule Take 2 capsules (1,334 mg) by mouth 3 times daily (morning, midday, late afternoon). 180 capsule 0 Past Week    carvedilol (Coreg) 12.5 mg tablet Take 3 tablets (37.5 mg) by mouth 2 times a day. 180 tablet 0 Past Week    cloNIDine (Catapres) 0.1 mg tablet Take 1 tablet (0.1 mg) by mouth 3 times a day. 90 tablet 1 Past Week    fluticasone furoate-vilanteroL (Breo Ellipta) 100-25 mcg/dose inhaler Inhale 1 puff once daily. 60 each 0 Past Month    isosorbide mononitrate ER (Imdur) 120 mg 24 hr tablet Take 1 tablet (120 mg) by mouth once daily. Do not crush or chew. 30 tablet 0 Past Week    NIFEdipine ER (Adalat CC) 90 mg 24 hr tablet Take 1 tablet (90 mg) by mouth once daily in the morning. Take before meals. Do not crush, chew, or split. 30 tablet 0 Past Week    sodium chloride (Ocean) 0.65 % nasal spray Administer 1 spray into each nostril 4 times a day as needed for congestion. 44 mL 12 Past Month    torsemide (Demadex) 20 mg tablet Take 2 tablets once daily on non-dialysis days only. Do not take on dialysis days. Do not start before July 3, 2024. 60 tablet 0 Past Month    valsartan (Diovan) 80 mg tablet Take 1 tablet (80 mg) by mouth once daily. 30 tablet 1 Past Week    acetaminophen (Tylenol) 325 mg tablet Take 2 tablets (650 mg) by mouth every 4 hours if needed for mild pain (1 - 3) or  moderate pain (4 - 6). 30 tablet 0 Unknown    apixaban (Eliquis) 5 mg tablet Take 1 tablet (5 mg) by mouth 2 times a day. 120 tablet 0 Unknown    epoetin joceline (Epogen,Procrit) 10,000 unit/mL injection Inject 1 mL (10,000 Units) under the skin 3 times a week.       fluticasone (Flonase) 50 mcg/actuation nasal spray Administer 2 sprays into each nostril once daily. Shake gently. Before first use, prime pump. After use, clean tip and replace cap. (Patient not taking: Reported on 7/22/2024) 16 g 12 More than a month        Meds:   apixaban, 5 mg, BID  atorvastatin, 80 mg, Nightly  B complex-vitamin C-folic acid, 1 capsule, Daily  calcium acetate, 1,334 mg, TID  carvedilol, 37.5 mg, BID  cloNIDine, 0.1 mg, TID  fluticasone, 2 spray, Daily  fluticasone furoate-vilanteroL, 1 puff, Daily  isosorbide mononitrate ER, 120 mg, Daily  lidocaine, 5 mL, Once  [START ON 7/23/2024] NIFEdipine ER, 90 mg, Daily before breakfast  nitroglycerin in 5 % dextrose, 1,000 mcg, Once  sodium zirconium cyclosilicate, 10 g, Once  [Held by provider] valsartan, 80 mg, Daily      nitroglycerin, Last Rate: Stopped (07/22/24 1215)      acetaminophen, 650 mg, q6h PRN  albuterol, 1.25 mg, q6h PRN  benzonatate, 100 mg, TID PRN  guaiFENesin, 600 mg, BID PRN  oxygen, , Continuous PRN - O2/gases  polyethylene glycol, 17 g, Daily PRN  sodium chloride, 1 spray, 4x daily PRN        Vitals:    07/22/24 1400   BP: 121/72   Pulse: 80   Resp: 21   Temp:    SpO2: 90%        No intake/output data recorded.   Weight change:      General appearance: AAOx3. No distress  Heart: RRR  Lungs: CTA bilat.    Abdomen: soft, nt/nd  Extremities: no edema bilat  Neuro: No FND,   ACCESS: RUE AVF      ASSESSMENT:  Stacey Heath is a 54-year-old female with a past medical history of ESRD TTS, DM, HTN who presents for shortness of breath. Admitted to MICU for hypertensive urgency.     #ESRD TTS via RUE AVF  -HD at [facility], nephrologist is Dr. Garcia  -KEN 55kg     #MBD -  Phoslo  #Anemia - EPO with HD     #HTN   -coreg 37.5 BID, clonidine 0.1mg TID, nifedipine 90, imdur, valsartan  -TTE 4/24: trace pericardial effusion, severe LVH, normal EF      Recommendations:  -Attempted IUF today given hypertensive urgency, pulmonary edema however BP dropped to sBP 80s after 400ml IUF today so will stop treatment and plan for IHD tomorrow. No indication to continue therapy given improvement in BP and respiratory status.    -Dry weight 55kg however will need this adjusted at chronic unit given wt is 49kg on admission.   -Would recommend Echocardiogram to assess for pericardial effusion due to acute drop in BP after starting IUF   -Continue current antihypertensive regimen with recent addition of clonidine and valsartan last week. Pt appears euvolemic so there is question of compliance given multiple admissions for hypertensive urgency   -IHD tomorrow     Kirti Boyce DO  Nephrology Fellow  24 hour Renal Pager - [phone]

## 2024-07-22 NOTE — PROGRESS NOTES
ICU to [facility] Transfer Summary     I:  ICU Admission Reason & Brief ICU Course:    Stacey Heath is a 54 y.o. year old female patient admitted on 7/22/2024 with following ICU needs: hypertensive emergency and possible need of urgent hemodialysis/IUF     HPI:  Stacey Heath is a 53 y.o. 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      Patient reports she sleeping feeling short of breath and suddenly woke up and use her inhalers but didn't help at all, and her SOB was worse when she was laying flat, no chest pain or N/V that time. she recently admitted to [facility] because of hypertensive emergency and discharged on 7/18 .she is compliance with dialysis, with last session on Saturday.      In the ED, patient's initial BP was 250/120, HR 95, RR 26, O2 75% on RA. Patient was started on BiPAP and Nitroglycerin drip 300 mcg/min with goal blood pressure 140-150 systolic. On point-of-care ultrasound had diffuse bilateral b lines . ECG showed deep T wave inversion in inf leads and v5-v6, lactate 1, ph 7.30 with respiratory acidosis and chronic metabolic compensation, started patient on home bp medications. Troponin downtrending from 90 to 78.  BNP elevated at 3000 although down from patient's baseline consistent with CHF and fluid overload. Chest x-ray showed bilateral pulmonary edema but improved from previous imaging.  Patient was able to be weaned down to 2 L nasal cannula while in the emergency department however still requires nitro drip for ongoing blood pressure control.  After 80 mg Lasix in the emergency department patient failed to produce adequate urine output for us today.  I think she would benefit from emergent dialysis today for fluid removal to improve her ongoing respiratory and hemodynamic status  ICU course:   Upon  admission to MICU, / 90 , HR 85, Sat o2 100, on 2 lit NC. No cp, no n/v and she stated she feels better and doesn't feel SOB when sitting. Discontinued Nitroglycerin and started on home medications, nephrology consulted for indication of emergent HD, based on nephrology patient started on IUF , however he BP dropped to SBP to 80s after 400 ml IUF so we stopped the treatment and planned for IHD tomorrow. No indication to continue therapy given improvement in BP and respiratory status.       C: Code Status/DPOA Info/Goals of Care/ACP Note    Full Code  DPOA/Contact Number:  Hai Pinedo (spouse) [phone]     U: Unprescribing & Pertinent High-Risk Medications    Changes to home meds:   none     Anticoagulation: No Reason for no VTE prophylaxis:procedure not indicated    Antibiotics:   [x] N/A - no current planned antimicrobioals      P: Pending Tests at the Time of Transfer   none      A: Active consultants, including Rehab:   []  Subspecialty Consultants: nephrology  [x]  PT  [x]  OT  []  SLP  []  Wound Care    U: Uncertainty Measure/Diagnostic Pause:    Working diagnosis at the time of transfer hypertention emergency      Diagnosis Degree of Certainty: 1. High degree of certainty about the clinical diagnosis.     S: Summary of Major Problems and To-Dos:   Assessment:  Stacey Heath is a 54 y.o. year old female patient with PMH ESRD 2/2 HTN and diabetes on dialysis T/Th/Sat via RUE AVF (last complete session 7/20/24) and poorly controlled hypertension, DMT2, COPD, brachial DVT on Eliquis (4/14/2024) admitted to MICU for hypertensive emergency and pulmonary edema.      Mechanical Ventilation: none  Sedation/Analgesia:  none  Restraints: no     Plan:  NEUROLOGY/PSYCH:  #Headache  -secondary to hypertension vs nitroglycerin gtt  Plan:  -wean nitroglycerin gtt as tolerated        CARDIOVASCULAR:  #Hypertensive emergency  #Pulmonary edema  -Presented with /120 and SOB     Plan:   -nitroglycerin gtt, wean  as tolerated  -continue home regimen  -Needs medication reconciliation and optimization of BP regimen to limit readmissions to the hospital          #HFpEF  -Echo 6/2023 with EF 55-60%  -Limited with GDMT given ESRD   Plan:  -[place] on non-dialysis days  -medication reconciliation and optimization      #Hx of Brachial DVT  -Continue Eliquis 5 mg BID     PULMONARY:  #AHRF 2/2 volume overload/moderate pulmonary edema  -plan for dialysis overnight  -nephrology consulted  -goal O2 sat >94%, titrate oxygen as needed     #COPD ( 7/2022: FEV1/FVC 75.22. Mild obstructive pattern)  -following with pulm fellow clinic at ,  Plan:   -continue home albuterol prn and breo ellipta     RENAL/GENITOURINARY:  #ESRD on HD TTS   #Hypertensive emergency  #Flash pulmonary edema   -Access: RUE AVF   -HD unit: [facility]  -Primary nephrologist: Dr Garcia  -EDW: Uncertain, 54 kg from outpatient record  Plan:   -Emergent IUF today  -Nephrology consulted and aware  -Electrolyte check post dialysis     GASTROENTEROLOGY:  ZAIN     ENDOCRINOLOGY:  #T2DM c/b retinopathy, nephropathy and neuropathy  -no medications  -last A1c 5.2 (6/29/24)  Plan:   Continue with her current diet      HEMATOLOGY:  #Anemia of chronic disease I/s/o ESRD  -on Epo outpatient   Plan:  -continue on home medication and EPO outpatient     MUSCULOSKELETAL/ SKIN:  ZAIN     INFECTIOUS DISEASE:  ZAIN     E: Exam, including Lines/Drains/Airways & Data Review:     Difficult airway? No  Lines/drains assessed for removal? No    Within 30 minutes of the patient physically leaving the floor, a Floor Readiness Note needs to be placed with updated vitals.

## 2024-07-22 NOTE — PROGRESS NOTES
Emergency Medicine Transition of Care Note.    I received Stacey Heath in signout from Dr. Jasmine.  Please see the previous ED provider note for all HPI, PE and MDM up to the time of signout at 0700. This is in addition to the primary record.    In brief Stacey Heath is an 54 y.o. female presenting for   Chief Complaint   Patient presents with    Shortness of Breath     At the time of signout we were awaiting: transfer to MICU    Medical Decision Making  54 y.o.female presenting to the Emergency Department for fluid overload in setting of ESRD.  On arrival, blood pressure >250 systolic.  Given sublingual nitro and was placed on BiPAP now on 4L NC.  Started on nitroglycerin drip at 300 mcg/min with goal 140-150 systolic.  Patient home medications were restarted however we were unable to continue to wean her nitroglycerin infusion with ongoing hypertension and spite of long-acting p.o. medications plus nitroglycerin.  Patient has been discussed with the MICU who accepted for evaluation and treatment of flash pulmonary edema with ongoing hypertension requiring infusion of antihypertensive medications.        Please see ED course for updates on patient status, results, and disposition.     ED Course as of 07/22/24 0713   Mon Jul 22, 2024   0331 Pt seen upon arrival to room three, had a sudden onset of SOB that woke her from sleep; hx of ESRD on HD t/th/sat; no missed sessions; stated they took off less fluid than her baseline. Denied chest pain, fevers, chills. +cough but no sputum production. Hypertensive of +++/150s upon arrival with an o2 saturation in the 70s on RA; improved on a NRB and increased to 100% on BIPAP. Difficult IV stick, EJ placed on the L. Given SL nitroglycerin with improvement in systolics to 250. B lines noted bilaterally on US. Exam and hx consistent with SCAPE/fluid overload. Started on a nitroglycerin drip, will give lasix as the patient still makes some urine and reeval. Anticipate pt  being able to be weaned off of drip and BIPAP for disposition.  [LP]   0544 Labs consistent with fluid overload, repeat EKG pending. Pt stable on bipap. No urination with lasix. Remains on nitroglycerin drip. Discussed with MICU for admission 2/2 need for dialysis to assist in bp control/fluid status/bipap weaning. Pt accepted to MICU [LP]   0617 Repeat EKG: Regular rate, regular rhythm.  MS, QRS, QTc intervals within normal limits.  No obvious ST elevations or depressions.  T wave inversions once again seen in leads I, 2, aVL, V5/V6. Improved from previous. [AS]      ED Course User Index  [AS] Shawn Jasmine MD  [LP] Laina Arevalo DO         Diagnoses as of 07/22/24 0713   Flash pulmonary edema (Multi)           Final diagnoses:   [J81.0] Flash pulmonary edema (Multi)           Procedure  Procedures    Patient seen and discussed with Dr. Gracie Castillo DO  PGY-3 Emergency Medicine

## 2024-07-22 NOTE — ED PROVIDER NOTES
Emergency Department Provider Note          History of Present Illness     CC: Shortness of Breath     History provided by: Patient  Limitations to History: None    HPI: Patient is a 54-year-old female with history of HTN, T2DM, COPD, ESRD (T/Th/Sat), presenting to emergency department today for shortness of breath and fluid overload.  Last dialysis session was on Saturday, reports she got a full session with 1 L off.  Woke up with middle of the night with significant shortness of breath.  Feels like this is similar to her last SCAPE exacerbations of fluid overload.  Reports she been feeling otherwise clinically well in the past couple days.  Denies fevers, chills, or productive sputum.  Has had a dry cough.  Denies chest pain for us as well.  Does make small amounts of urine, taking 20 mg of torsemide at home.       Records Reviewed: Recent available ED and inpatient notes reviewed in EMR.    PMHx/PSHx:  Per HPI.   - has a past medical history of Bacteremia due to methicillin resistant Staphylococcus aureus, Cardiac/pericardial tamponade ([facility]), CHF (congestive heart failure) (Multi), COPD (chronic obstructive pulmonary disease) (Multi), Coronary artery disease, Disorder of sweat glands, Dry eye syndrome of bilateral lacrimal glands, ESRD (end stage renal disease) (Multi), Essential (primary) hypertension, History of acute pancreatitis, Low grade squamous intraepithelial lesion (LGSIL) on cervicovaginal cytologic smear, Migraines, 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, and Type 2 myocardial infarction (Multi).  - has a past surgical history that includes Tubal ligation (03/07/2017); Other surgical history (03/07/2017); Appendectomy (03/07/2017); Other surgical history (03/07/2017); Other surgical history (12/08/2021); Other surgical history (12/08/2021); and CT angio abdomen w and or wo IV IV contrast (4/23/2023).  - has Anxiety; Astigmatism; Carotid bruit; Diabetes mellitus type 2, uncomplicated (Multi);  Coronary artery disease involving native coronary artery; Nephropathy; Essential hypertension; Iron deficiency anemia; Low grade squamous intraepith lesion on cytologic smear cervix (lgsil); Migraine; Neuropathy; Nicotine use disorder; Other pericardial effusion (noninflammatory) ([facility]); Sweating abnormality; Transplant; Acute pulmonary edema with heart disease (Multi); Polyneuropathy due to type 2 diabetes mellitus (Multi); Nuclear senile cataract of both eyes; Normocytic normochromic anemia; Low back pain; Hidradenitis; Complex dyslipidemia; Chronic heart failure with preserved ejection fraction (HFpEF) (Multi); Malnutrition of moderate degree (Multi); Shortness of breath; Kidney transplant candidate; Nonrheumatic mitral valve regurgitation; Chronic deep vein thrombosis (DVT) of brachial vein of left upper extremity (Multi); Acute diastolic CHF (congestive heart failure) (Multi); and Flash pulmonary edema (Multi) on their problem list.    Medications:  Current Outpatient Medications   Medication Instructions    acetaminophen (TYLENOL) 650 mg, oral, Every 4 hours PRN    albuterol 1.25 mg, nebulization, Every 6 hours PRN    apixaban (ELIQUIS) 5 mg, oral, 2 times daily    atorvastatin (LIPITOR) 80 mg, oral, Nightly    B complex-vitamin C-folic acid (Nephrocaps) 1 mg capsule 1 capsule, oral, Daily    benzonatate (TESSALON) 100 mg, oral, 3 times daily PRN, Do not crush or chew.    calcium acetate (PHOSLO) 1,334 mg, oral, 3 times daily (morning, midday, late afternoon)    carvedilol (COREG) 37.5 mg, oral, 2 times daily    cloNIDine (CATAPRES) 0.1 mg, oral, 3 times daily    epoetin joceline (EPOGEN,PROCRIT) 10,000 Units, subcutaneous, 3 times weekly    fluticasone (Flonase) 50 mcg/actuation nasal spray 2 sprays, Each Nostril, Daily, Shake gently. Before first use, prime pump. After use, clean tip and replace cap.    fluticasone furoate-vilanteroL (Breo Ellipta) 100-25 mcg/dose inhaler 1 puff, inhalation, Daily RT     isosorbide mononitrate ER (IMDUR) 120 mg, oral, Daily, Do not crush or chew.    NIFEdipine ER (ADALAT CC) 90 mg, oral, Daily before breakfast, Do not crush, chew, or split.    polyethylene glycol (GLYCOLAX, MIRALAX) 17 g, oral, Daily PRN    sodium chloride (Ocean) 0.65 % nasal spray 1 spray, Each Nostril, 4 times daily PRN    torsemide (Demadex) 20 mg tablet Take 2 tablets once daily on non-dialysis days only. Do not take on dialysis days. Do not start before July 3, 2024.    valsartan (DIOVAN) 80 mg, oral, Daily        Allergies:  Iodine, Bioflavonoids, Codeine, Flowers, Hydromorphone, and Shellfish containing products    Social History:  - Tobacco:  reports that she has quit smoking. Her smoking use included cigarettes. She has never used smokeless tobacco.   - Alcohol:  reports that she does not currently use alcohol.   - Illicit Drugs:  reports that she does not currently use drugs after having used the following drugs: Cocaine and Marijuana.     ROS:  Per HPI.       Physical Exam     Triage Vitals:  T 35.3 °C (95.5 °F)  HR (!) 102  BP (!) 265/131  RR (!) 29  O2 (!) 76 % Supplemental oxygen    General: Awake, alert, in no acute distress  Eyes: Gaze conjugate.  No scleral icterus or injection  HENT: Normo-cephalic, atraumatic. No stridor  CV: Tachycardic rate, regular rhythm. Radial pulses 2+ bilaterally  Resp: Breathing non-labored, speaking in full sentences.  Clear to auscultation bilaterally  GI: Soft, non-distended, non-tender. No rebound or guarding.  MSK/Extremities: No gross bony deformities. Moving all extremities  Skin: Warm. Appropriate color  Neuro: Alert. Oriented. Face symmetric. Speech is fluent.  Gross strength and sensation intact in b/l UE and LEs  Psych: Appropriate mood and affect          [place] Coma Scale Score: 15                    Medical Decision Making & ED Course     EKG: EKG interpreted by myself. Please see ED Course for full interpretation.    Medical Decision Making   Stacey HARRISON  Kumar is a 54 y.o.female presenting to the Emergency Department for fluid overload in setting of ESRD.  On arrival, blood pressure >250 systolic.  Given sublingual nitro and was placed on BiPAP.  Diffuse bilateral B-lines seen on point-of-care ultrasound.  Had significant difficulty obtaining IV access but an EJ was able to place in the left neck.  Started on nitroglycerin drip at 300 mcg/min.  Blood pressure goal 140-150 systolic.  Will send off basic labs including a BNP and troponin further evaluation.    Update: EKG showed deep T wave inversions in leads II, III, aVF as well as V5 V6 concerning for acute coronary ischemia likely in the setting of fluid overload.  Initial venous blood gas showed a lactate of 1, pH of 7.30 with respiratory acidosis and chronic metabolic compensation.  Started on patient's home blood pressure medications.  HI was able to be titrated throughout the evening.  Troponin downtrending from 90-78.  BNP elevated at 3000 although down from patient's baseline consistent with CHF and fluid overload.  CBC notable for white count of 10.4, hemoglobin stable at 9.6.  Chest x-ray showed bilateral pulmonary edema but improved from previous imaging.  Patient was able to be weaned down to 2 L nasal cannula while in the emergency department however still requires nitro drip for ongoing blood pressure control.  After 80 mg Lasix in the emergency department patient failed to produce adequate urine output for us today.  I think she would benefit from emergent dialysis today for fluid removal to improve her ongoing respiratory and hemodynamic status. Admitted to the MICU.    ED Course as of 07/22/24 0702   Mon Jul 22, 2024   0331 Pt seen upon arrival to room three, had a sudden onset of SOB that woke her from sleep; hx of ESRD on HD t/th/sat; no missed sessions; stated they took off less fluid than her baseline. Denied chest pain, fevers, chills. +cough but no sputum production. Hypertensive of +++/150s  upon arrival with an o2 saturation in the 70s on RA; improved on a NRB and increased to 100% on BIPAP. Difficult IV stick, EJ placed on the L. Given SL nitroglycerin with improvement in systolics to 250. B lines noted bilaterally on US. Exam and hx consistent with SCAPE/fluid overload. Started on a nitroglycerin drip, will give lasix as the patient still makes some urine and reeval. Anticipate pt being able to be weaned off of drip and BIPAP for disposition.  [LP]   0544 Labs consistent with fluid overload, repeat EKG pending. Pt stable on bipap. No urination with lasix. Remains on nitroglycerin drip. Discussed with MICU for admission 2/2 need for dialysis to assist in bp control/fluid status/bipap weaning. Pt accepted to MICU [LP]   0617 Repeat EKG: Regular rate, regular rhythm.  ME, QRS, QTc intervals within normal limits.  No obvious ST elevations or depressions.  T wave inversions once again seen in leads I, 2, aVL, V5/V6. Improved from previous. [AS]      ED Course User Index  [AS] Shawn Jasmine MD  [LP] Laina Arevalo DO         Diagnoses as of 07/22/24 0702   Flash pulmonary edema (Multi)       Independent Result Review and Interpretation: Relevant laboratory and radiographic results were reviewed and independently interpreted by myself.  As necessary, they are commented on in the ED Course.    Chronic conditions affecting the patient's care: As documented above in MDM.      Disposition   Admit    Shawn Jasmine MD  Emergency Medicine PGY3      Procedures     Procedures ? SmartLinks last updated 7/22/2024 7:02 AM        Shawn Jasmine MD  Resident  07/22/24 0702

## 2024-07-22 NOTE — SIGNIFICANT EVENT
Floor Readiness Note       I, personally, evaluated Stacey Heath prior to transfer to the floor, including reviewing all current laboratory and imaging studies. The patient remains appropriate for transfer to the floor. Bedside nurse and respiratory therapy are also in agreement of patient's readiness for the floor.     Brief summary:  Stacey Heath is a 54 y.o. female who was admitted to the MICU on 07/22/24 for hypertensive emergency and possible HD They have been treated with Nitrohlycerin drip.    Updated focused Physical Exam:      Current Vital Signs:  Heart Rate: 81 (07/22/24 1600 : Yared Hannah RN)  BP: 135/65 (07/22/24 1600 : Yared Hannah RN)  Temp: 35.1 °C (95.2 °F) (07/22/24 1600 : Monica Abbasi)  Resp: 16 (07/22/24 1600 : Yared Hannah RN)  SpO2: 100 % (07/22/24 1600 : Yared Hannah RN)    Relevant updates since rounds:  Neck:      Comments: No JVD   Cardiovascular:      Rate and Rhythm: Normal rate and regular rhythm.      Pulses: Normal pulses.      Heart sounds: Normal heart sounds.      Comments: No murmur  Pulmonary:      Effort: Pulmonary effort is normal.      Breath sounds: Rales present.      Comments: Diminished lung sounds on both lower lobes  Abdominal:      Palpations: Abdomen is soft.   Musculoskeletal:      Comments: No LE edema      Accepting team, {Team:[id number]}, received verbal sign out and the Provider Care team/Attending has been updated. Bedside nurse will now call accepting nurse for report and patient will be transferred to {Location:[id number]} ***.    Carlos Little MD

## 2024-07-22 NOTE — NURSING NOTE
IV team called for PIV placement. Patient currently has a Left EJ. Right arm cannot be used due to AV fistula. 1 PIV attempt in left hand failed to advance. No veins seen on ultrasound. Bedside nurse made aware.

## 2024-07-22 NOTE — PROGRESS NOTES
Pharmacy Medication History Review    Stacey Heath is a 54 y.o. female admitted for Flash pulmonary edema (Multi). Pharmacy reviewed the patient's [id number] medications and allergies for accuracy.    The list below reflects the updated PTA list. Comments regarding how patient may be taking medications differently can be found in the Admit Orders Activity.  Prior to Admission Medications   Prescriptions Last Dose Informant   B complex-vitamin C-folic acid (Nephrocaps) 1 mg capsule Past Week Self, Other   Sig: Take 1 capsule by mouth once daily.   NIFEdipine ER (Adalat CC) 90 mg 24 hr tablet Past Week Self, Other   Sig: Take 1 tablet (90 mg) by mouth once daily in the morning. Take before meals. Do not crush, chew, or split.   acetaminophen (Tylenol) 325 mg tablet Unknown Self, Other   Sig: Take 2 tablets (650 mg) by mouth every 4 hours if needed for mild pain (1 - 3) or moderate pain (4 - 6).   albuterol 1.25 mg/3 mL nebulizer solution Past Month Self, Other   Sig: Take 3 mL (1.25 mg) by nebulization every 6 hours if needed for wheezing.   albuterol sulfate (Proair Digihaler) 90 mcg/actuation aero powdr breath act w/sensor inhaler Past Month Self, Other   Sig: Inhale 2 puffs 2 times a day as needed for wheezing or shortness of breath.   apixaban (Eliquis) 5 mg tablet Unknown Self   Sig: Take 1 tablet (5 mg) by mouth 2 times a day.   atorvastatin (Lipitor) 80 mg tablet Past Week Self, Other   Sig: Take 1 tablet (80 mg) by mouth once daily at bedtime.   benzonatate (Tessalon) 100 mg capsule Past Week Self, Other   Sig: Take 1 capsule (100 mg) by mouth 3 times a day as needed for cough. Do not crush or chew.   calcium acetate (Phoslo) 667 mg capsule Past Week Self, Other   Sig: Take 2 capsules (1,334 mg) by mouth 3 times daily (morning, midday, late afternoon).   carvedilol (Coreg) 12.5 mg tablet Past Week Self, Other   Sig: Take 3 tablets (37.5 mg) by mouth 2 times a day.   cloNIDine (Catapres) 0.1 mg tablet  Past Week Self, Other   Sig: Take 1 tablet (0.1 mg) by mouth 3 times a day.   epoetin joceline (Epogen,Procrit) 10,000 unit/mL injection  Self, Other   Sig: Inject 1 mL (10,000 Units) under the skin 3 times a week.   fluticasone (Flonase) 50 mcg/actuation nasal spray More than a month Self, Other   Sig: Administer 2 sprays into each nostril once daily. Shake gently. Before first use, prime pump. After use, clean tip and replace cap.   Patient not taking: Reported on 7/22/2024   fluticasone furoate-vilanteroL (Breo Ellipta) 100-25 mcg/dose inhaler Past Month Self, Other   Sig: Inhale 1 puff once daily.   isosorbide mononitrate ER (Imdur) 120 mg 24 hr tablet Past Week Self, Other   Sig: Take 1 tablet (120 mg) by mouth once daily. Do not crush or chew.   sodium chloride (Ocean) 0.65 % nasal spray Past Month Self, Other   Sig: Administer 1 spray into each nostril 4 times a day as needed for congestion.   torsemide (Demadex) 20 mg tablet Past Month Self, Other   Sig: Take 2 tablets once daily on non-dialysis days only. Do not take on dialysis days. Do not start before July 3, 2024.   valsartan (Diovan) 80 mg tablet Past Week Self, Other   Sig: Take 1 tablet (80 mg) by mouth once daily.      Facility-Administered Medications: None        The list below reflects the updated allergy list. Please review each documented allergy for additional clarification and justification.  Allergies  Reviewed by Viri Gallagher PharmD on 7/22/2024        Severity Reactions Comments    Iodine High Hives, Itching, Unknown     Bioflavonoids Not Specified Swelling     Codeine Not Specified Itching, Hives, Unknown Tolerates percocet   Tolerates percocet Tolerates percocet    Flowers Not Specified Itching     Hydromorphone Not Specified Itching     Shellfish Containing Products Not Specified Swelling SEAFOOD              Sources used to complete the med history include:  Out patient fill history   OARRS: checked 07/22/24  Concerns: No  Chart  "Review  7/8/24-7/18/24 admission  7/1/24 discharge summary    Medication Reconciliation  Added:  Albuterol sulfate HFA inhaler    Changed: none    Removed:  Miralax powder - therapy complete    Below are additional concerns with the patient's PTA list.  Anticoagulation/antiplatelet - pt s/p DVT 4/2024 on Eliquis. At discharge on 7/1/24, aspirin 81 mg PO daily was on med list; however, at 7/8/24 admission/discharge, aspirin not found. Unable to verify if pt to be taking aspirin and Eliquis.   Recommend evaluation of anticoag/antiplatelet regimen, duration of anticoag.       Patient accepts M2B at discharge. Pharmacy has been updated to [facility].  Please ensure Expected Discharge date is accurate under the Discharge Plan to ensure timely delivery. Please reach out to M2B team for any same-day discharge changes.  Vocera: \"Call Meds to Beds\", Zaynab: Ext#[phone]      MARQUISE MOISE, PharmD  PGY-1 Resident Pharmacist  Please reach out via Secure Chat for questions, or if no response call [phone] or vocera MedRec    "

## 2024-07-22 NOTE — HOSPITAL COURSE
Stacey Heath is a 53 y.o. 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      Patient reports she sleeping feeling short of breath and suddenly woke up and use her inhalers but didn't help at all, and her SOB was worse when she was laying flat, no chest pain or N/V that time. she recently admitted to [facility] because of hypertensive emergency and discharged on 7/18 .she is compliance with dialysis, with last session on Saturday.      In the ED, patient's initial BP was 250/120, HR 95, RR 26, O2 75% on RA. Patient was started on BiPAP and Nitrohlycerin drip 300 mcg/min with goal blood pressure 140-150 systolic. On point-of-care ultrasound had diffuse bilateral b lines . ECG showed deep T wave inversion in inf leads and v5-v6, lactate 1, ph 7.30 with respiratory acidosis and chronic metabolic compensation, started patient on home bp medications. Troponin downtrending from 90 to 78.  BNP elevated at 3000 although down from patient's baseline consistent with CHF and fluid overload. Chest x-ray showed bilateral pulmonary edema but improved from previous imaging.  Patient was able to be weaned down to 2 L nasal cannula while in the emergency department however still requires nitro drip for ongoing blood pressure control.  After 80 mg Lasix in the emergency department patient failed to produce adequate urine output for us today.  I think she would benefit from emergent dialysis today for fluid removal to improve her ongoing respiratory and hemodynamic status.  ICU course:   Upon admission to MICU, / 90 , HR 85, Sat o2 100, on 2 lit NC. No cp, no n/v and she stated she feels better and doesn't feel SOB when sitting. Discontinued Nitroglycerin and started on home medications, nephrology consulted for indication of emergent HD, based on  nephrology patient started on IUF , however he BP dropped to SBP to 80s after 400 ml IUF so we stopped the treatment and planned for IHD tomorrow. No indication to continue therapy given improvement in BP and respiratory status.     TO DO:     Home meds reconciliation, she is probably not giving a correct dose of medications

## 2024-07-22 NOTE — CARE PLAN
Problem: Skin  Goal: Participates in plan/prevention/treatment measures  Flowsheets (Taken 7/22/2024 1907)  Participates in plan/prevention/treatment measures:   Discuss with provider PT/OT consult   Elevate heels   Increase activity/out of bed for meals  Goal: Prevent/manage excess moisture  Flowsheets (Taken 7/22/2024 1907)  Prevent/manage excess moisture:   Moisturize dry skin   Monitor for/manage infection if present  Goal: Promote/optimize nutrition  Flowsheets (Taken 7/22/2024 1907)  Promote/optimize nutrition: Discuss with provider if NPO > 2 days  Goal: Promote skin healing  Flowsheets (Taken 7/22/2024 1907)  Promote skin healing:   Protective dressings over bony prominences   Rotate device position/do not position patient on device   Turn/reposition every 2 hours/use positioning/transfer devices   The patient's goals for the shift include      The clinical goals for the shift include to get off the oxygen

## 2024-07-22 NOTE — ED TRIAGE NOTES
Pt. Presents to ED through triage for c/c of SOB. Hx of ESRD on HD TTS, no missed sessions. Triage RN obtained pulse ox that was 80%, EKG obtained as well.

## 2024-07-22 NOTE — PROCEDURES
PICC Team    PICC RN assessed patient at bedside for placement of ordered midline IV. The RUE was not able to be assessed due to presence of iHD access. A large intraluminal echogenic mass was visualized in the left upper arm, occluding the basilic vein. Midline unable to be placed at this time. Orders discontinued. If patency restores and patient still requires access device, please enter new placement order.

## 2024-07-23 ENCOUNTER — DOCUMENTATION (OUTPATIENT)
Dept: BEHAVIORAL HEALTH | Facility: CLINIC | Age: 54
End: 2024-07-23

## 2024-07-23 ENCOUNTER — APPOINTMENT (OUTPATIENT)
Dept: DIALYSIS | Facility: HOSPITAL | Age: 54
End: 2024-07-23
Payer: COMMERCIAL

## 2024-07-23 LAB
ALBUMIN SERPL BCP-MCNC: 3.4 G/DL (ref 3.4–5)
ANION GAP SERPL CALC-SCNC: 20 MMOL/L (ref 10–20)
ATRIAL RATE: 111 BPM
BASOPHILS # BLD AUTO: 0.06 X10*3/UL (ref 0–0.1)
BASOPHILS NFR BLD AUTO: 1.1 %
BUN SERPL-MCNC: 62 MG/DL (ref 6–23)
CALCIUM SERPL-MCNC: 8.5 MG/DL (ref 8.6–10.6)
CHLORIDE SERPL-SCNC: 99 MMOL/L (ref 98–107)
CO2 SERPL-SCNC: 24 MMOL/L (ref 21–32)
CREAT SERPL-MCNC: 10.28 MG/DL (ref 0.5–1.05)
EGFRCR SERPLBLD CKD-EPI 2021: 4 ML/MIN/1.73M*2
EOSINOPHIL # BLD AUTO: 0.49 X10*3/UL (ref 0–0.7)
EOSINOPHIL NFR BLD AUTO: 8.7 %
ERYTHROCYTE [DISTWIDTH] IN BLOOD BY AUTOMATED COUNT: 19.6 % (ref 11.5–14.5)
GLUCOSE SERPL-MCNC: 204 MG/DL (ref 74–99)
HCT VFR BLD AUTO: 29 % (ref 36–46)
HGB BLD-MCNC: 8.6 G/DL (ref 12–16)
IMM GRANULOCYTES # BLD AUTO: 0.02 X10*3/UL (ref 0–0.7)
IMM GRANULOCYTES NFR BLD AUTO: 0.4 % (ref 0–0.9)
LYMPHOCYTES # BLD AUTO: 1.11 X10*3/UL (ref 1.2–4.8)
LYMPHOCYTES NFR BLD AUTO: 19.8 %
MAGNESIUM SERPL-MCNC: 2.26 MG/DL (ref 1.6–2.4)
MCH RBC QN AUTO: 28.5 PG (ref 26–34)
MCHC RBC AUTO-ENTMCNC: 29.7 G/DL (ref 32–36)
MCV RBC AUTO: 96 FL (ref 80–100)
MONOCYTES # BLD AUTO: 0.43 X10*3/UL (ref 0.1–1)
MONOCYTES NFR BLD AUTO: 7.7 %
NEUTROPHILS # BLD AUTO: 3.51 X10*3/UL (ref 1.2–7.7)
NEUTROPHILS NFR BLD AUTO: 62.3 %
NRBC BLD-RTO: 0 /100 WBCS (ref 0–0)
P AXIS: 35 DEGREES
P OFFSET: 195 MS
P ONSET: 159 MS
PHOSPHATE SERPL-MCNC: 5.6 MG/DL (ref 2.5–4.9)
PLATELET # BLD AUTO: 234 X10*3/UL (ref 150–450)
POTASSIUM SERPL-SCNC: 5.2 MMOL/L (ref 3.5–5.3)
PR INTERVAL: 120 MS
Q ONSET: 219 MS
QRS COUNT: 18 BEATS
QRS DURATION: 82 MS
QT INTERVAL: 336 MS
QTC CALCULATION(BAZETT): 456 MS
QTC FREDERICIA: 412 MS
R AXIS: 50 DEGREES
RBC # BLD AUTO: 3.02 X10*6/UL (ref 4–5.2)
SODIUM SERPL-SCNC: 138 MMOL/L (ref 136–145)
T AXIS: 254 DEGREES
T OFFSET: 387 MS
VENTRICULAR RATE: 111 BPM
WBC # BLD AUTO: 5.6 X10*3/UL (ref 4.4–11.3)

## 2024-07-23 PROCEDURE — 2500000004 HC RX 250 GENERAL PHARMACY W/ HCPCS (ALT 636 FOR OP/ED): Performed by: STUDENT IN AN ORGANIZED HEALTH CARE EDUCATION/TRAINING PROGRAM

## 2024-07-23 PROCEDURE — 2500000001 HC RX 250 WO HCPCS SELF ADMINISTERED DRUGS (ALT 637 FOR MEDICARE OP): Performed by: STUDENT IN AN ORGANIZED HEALTH CARE EDUCATION/TRAINING PROGRAM

## 2024-07-23 PROCEDURE — 85025 COMPLETE CBC W/AUTO DIFF WBC: CPT

## 2024-07-23 PROCEDURE — 90935 HEMODIALYSIS ONE EVALUATION: CPT | Performed by: INTERNAL MEDICINE

## 2024-07-23 PROCEDURE — 80069 RENAL FUNCTION PANEL: CPT

## 2024-07-23 PROCEDURE — 1100000001 HC PRIVATE ROOM DAILY

## 2024-07-23 PROCEDURE — 8010000001 HC DIALYSIS - HEMODIALYSIS PER DAY

## 2024-07-23 PROCEDURE — 99233 SBSQ HOSP IP/OBS HIGH 50: CPT | Performed by: STUDENT IN AN ORGANIZED HEALTH CARE EDUCATION/TRAINING PROGRAM

## 2024-07-23 PROCEDURE — 2500000001 HC RX 250 WO HCPCS SELF ADMINISTERED DRUGS (ALT 637 FOR MEDICARE OP)

## 2024-07-23 PROCEDURE — 83735 ASSAY OF MAGNESIUM: CPT

## 2024-07-23 PROCEDURE — 2500000002 HC RX 250 W HCPCS SELF ADMINISTERED DRUGS (ALT 637 FOR MEDICARE OP, ALT 636 FOR OP/ED)

## 2024-07-23 PROCEDURE — 36415 COLL VENOUS BLD VENIPUNCTURE: CPT

## 2024-07-23 RX ORDER — DIPHENHYDRAMINE HYDROCHLORIDE 50 MG/ML
25 INJECTION INTRAMUSCULAR; INTRAVENOUS ONCE
Status: COMPLETED | OUTPATIENT
Start: 2024-07-23 | End: 2024-07-23

## 2024-07-23 RX ORDER — POLYETHYLENE GLYCOL 3350 17 G/17G
17 POWDER, FOR SOLUTION ORAL DAILY
Status: DISCONTINUED | OUTPATIENT
Start: 2024-07-23 | End: 2024-07-25 | Stop reason: HOSPADM

## 2024-07-23 RX ORDER — METOCLOPRAMIDE HYDROCHLORIDE 5 MG/ML
10 INJECTION INTRAMUSCULAR; INTRAVENOUS ONCE
Status: COMPLETED | OUTPATIENT
Start: 2024-07-23 | End: 2024-07-23

## 2024-07-23 RX ORDER — DICYCLOMINE HYDROCHLORIDE 10 MG/1
10 CAPSULE ORAL 2 TIMES DAILY PRN
Status: DISCONTINUED | OUTPATIENT
Start: 2024-07-23 | End: 2024-07-25 | Stop reason: HOSPADM

## 2024-07-23 RX ORDER — VALSARTAN 80 MG/1
80 TABLET ORAL DAILY
Status: DISCONTINUED | OUTPATIENT
Start: 2024-07-23 | End: 2024-07-24

## 2024-07-23 RX ORDER — TRAMADOL HYDROCHLORIDE 50 MG/1
50 TABLET ORAL ONCE
Status: COMPLETED | OUTPATIENT
Start: 2024-07-23 | End: 2024-07-23

## 2024-07-23 ASSESSMENT — PAIN SCALES - GENERAL
PAINLEVEL_OUTOF10: 0 - NO PAIN
PAINLEVEL_OUTOF10: 0 - NO PAIN
PAINLEVEL_OUTOF10: 10 - WORST POSSIBLE PAIN

## 2024-07-23 ASSESSMENT — PAIN - FUNCTIONAL ASSESSMENT: PAIN_FUNCTIONAL_ASSESSMENT: NO/DENIES PAIN

## 2024-07-23 ASSESSMENT — ACTIVITIES OF DAILY LIVING (ADL): LACK_OF_TRANSPORTATION: NO

## 2024-07-23 NOTE — PROGRESS NOTES
Physical Therapy                 Therapy Communication Note    Patient Name: Stacey Heath  MRN: [id number]  Today's Date: 7/23/2024     Discipline: Physical Therapy    Missed Visit Reason: Missed Visit Reason:  (@809, pt declined to participate at this time, awaiting going to dialysis, states she would be willing post dialysis, will reattempt as schedule permits.)    Missed Time: Attempt      07/23/24 at 9:06 AM - Lazara Carter, PT

## 2024-07-23 NOTE — PROGRESS NOTES
07/23/24 1358   Discharge Planning   Living Arrangements Spouse/significant other;Family members   Support Systems Spouse/significant other   Assistance Needed Independent for adls   Type of Residence Private residence   Number of Stairs to Enter Residence 6   Number of Stairs Within Residence 15   Home or Post Acute Services None   Expected Discharge Disposition Home   Does the patient need discharge transport arranged? Yes   RoundTrip coordination needed? Yes   Financial Resource Strain   How hard is it for you to pay for the very basics like food, housing, medical care, and heating? Not hard   Housing Stability   In the last 12 months, was there a time when you were not able to pay the mortgage or rent on time? N   At any time in the past 12 months, were you homeless or living in a shelter (including now)? N   Transportation Needs   In the past 12 months, has lack of transportation kept you from medical appointments or from getting medications? no   In the past 12 months, has lack of transportation kept you from meetings, work, or from getting things needed for daily living? No     Transitional Care Coordination Progress Note:  Patient discussed during interdisciplinary rounds.   Team members present: MD, TCC  Plan per Medical/Surgical team: Flash Pulmonary Edema  Payor: [employer]  Discharge disposition: pt/ot pending  Potential Barriers: none  ADOD: 7/24    Previous Home Care: NA  DME: has cane and walker but does not use; glucometer and testing supplies  Pharmacy: [facility] Fl[facility]: [place]  PCP:  [facility] [place]; last visit May 2024  Met with patient at bedside, provided introduction of self and role. Patient states she lives at home with fiancee and nephew. Patient states she is independent for adls; safe at home. Patient receives HD at [facility] Tues/Thurs/Sat, last there Sat 7/20. Patient states either she drives self to appointments and HD or her fiance provides transport. Patient states no  concerns obtaining/affording medications; states no social/financial concerns. Patient states she may need lyft transport home at time of discharge. Will continue to monitor for discharge planning needs.     Erin CARLSON, RN  Transitional Care Coordinator (TCC)  [phone]

## 2024-07-23 NOTE — PROGRESS NOTES
Renal Staff HD Note    Patient seen, examined on dialysis.  Her blood pressure is high.  Tolerating treatment without event    BP Readings from Last 3 Encounters:   07/23/24 (!) 207/78   07/18/24 (!) 199/82   07/01/24 171/72     [unfilled]  Lab Results   Component Value Date    CREATININE 10.28 (H) 07/23/2024    BUN 62 (H) 07/23/2024     07/23/2024    K 5.2 07/23/2024    CL 99 07/23/2024    CO2 24 07/23/2024     Lab Results   Component Value Date    PTH 1,415.0 (H) 05/31/2024    CALCIUM 8.5 (L) 07/23/2024    CAION 1.18 03/14/2023    PHOS 5.6 (H) 07/23/2024     @  Lab Results   Component Value Date    HGB 8.6 (L) 07/23/2024       - Continue treatment per submitted orders   -Resume Home dose of valsartan

## 2024-07-23 NOTE — SIGNIFICANT EVENT
Floor Readiness Note         I, personally, evaluated Stacey Heath prior to transfer to the floor, including reviewing all current laboratory and imaging studies. The patient remains appropriate for transfer to the floor. Bedside nurse and respiratory therapy are also in agreement of patient's readiness for the floor.      Brief summary:  Stacey Heath is a 54 y.o. female who was admitted to the MICU on 07/22/24 for hypertensive emergency and possible HD They have been treated with Nitrohlycerin drip.     Updated focused Physical Exam:        Current Vital Signs:  Heart Rate: 81 (07/22/24 1600 : Yared Hannah RN)  BP: 135/65 (07/22/24 1600 : Yared Hannah RN)  Temp: 35.1 °C (95.2 °F) (07/22/24 1600 : Monica Abbasi)  Resp: 16 (07/22/24 1600 : Yared Hannah RN)  SpO2: 100 % (07/22/24 1600 : Yared Hannah RN)     Relevant updates since rounds:  Neck:      Comments: No JVD   Cardiovascular:      Rate and Rhythm: Normal rate and regular rhythm.      Pulses: Normal pulses.      Heart sounds: Normal heart sounds.      Comments: No murmur  Pulmonary:      Effort: Pulmonary effort is normal.      Breath sounds: Rales present.      Comments: Diminished lung sounds on both lower lobes  Abdominal:      Palpations: Abdomen is soft.   Musculoskeletal:      Comments: No LE edema       Accepting team, Hospitalist SARITHA, received verbal sign out and the Provider Care team/Attending has been updated. Bedside nurse will now call accepting nurse for report and patient will be transferred to [facility]

## 2024-07-23 NOTE — CARE PLAN
The patient's goals for the shift include      The clinical goals for the shift include BP will be WNL this shift.    Over the shift, the patient did not make progress toward the following goals. Barriers to progression include uses both leg to check bp and pain. Recommendations to address these barriers include medicating pain and scheduled/prn BP meds.

## 2024-07-23 NOTE — NURSING NOTE
Report from Sending RN:    Report From: Jacqueline ( PONCHO)  Recent Surgery of Procedure: No  Baseline Level of Consciousness (LOC): a/o x 4  Oxygen Use: No  Type: none  Diabetic: Yes, 151  Last BP Med Given Day of Dialysis: nifedipine 90 mg; hydralazine 25 mg  Last Pain Med Given: none  Lab Tests to be Obtained with Dialysis: Yes, cbc, rfp, mag  Blood Transfusion to be Given During Dialysis: No  Available IV Access: Yes  Medications to be Administered During Dialysis: No  Continuous IV Infusion Running: No  Restraints on Currently or in the Last 24 Hours: No  Hand-Off Communication: No acute overnight or morning events; Pt BP slightly elevated 187/79; Pt did take BP medications; Pt will need labs; Pt is a full code; Cassidy Gonzalez RN.  Dialysis Catheter Dressing: right arm fistula  Last Dressing Change: n/s

## 2024-07-23 NOTE — PROGRESS NOTES
INTERNAL MEDICINE PROGRESS NOTE     BRIEF NARRATIVE    Stacey Heath is a 53 y.o. 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  Upon admission to MICU, / 90 , HR 85, Sat o2 100, on 2 lit NC. No cp, no n/v and she stated she feels better and doesn't feel SOB when sitting. Discontinued Nitroglycerin and started on home medications, nephrology consulted for indication of emergent HD, based on nephrology patient started on IUF , however he BP dropped to SBP to 80s after 400 ml IUF stopped the treatment and planned for IHD. PT transferred to floors on 7/22 for ongoing management. Will continue IHD, currently on 1L O2, will plan to wean as tolerated.     SUBJECTIVE     Pt was seen and examined at beside. No acute events overnight.      OBJECTIVE      Visit Vitals  BP (!) 193/83   Pulse 83   Temp 36.6 °C (97.9 °F)   Resp 18        Intake/Output Summary (Last 24 hours) at 7/23/2024 1423  Last data filed at 7/23/2024 1248  Gross per 24 hour   Intake 2592 ml   Output 400 ml   Net 2192 ml       Physical Exam  Constitutional:       Appearance: Normal appearance.   HENT:      Head: Normocephalic and atraumatic.   Cardiovascular:      Rate and Rhythm: Normal rate and regular rhythm.      Heart sounds: No murmur heard.  Pulmonary:      Effort: Pulmonary effort is normal. No respiratory distress.      Breath sounds: Normal breath sounds and air entry. No stridor. No wheezing or rhonchi.   Abdominal:      General: Abdomen is flat. Bowel sounds are normal.  There is no distension.      Palpations: Abdomen is soft. There is no mass.      Tenderness: There is no abdominal tenderness.   Musculoskeletal:         General: No swelling. Normal range of motion.      Right lower leg: No edema.      Left lower leg: No edema.   Skin:     General: Skin is warm and dry.      Capillary Refill: Capillary refill takes less than 2 seconds.      Findings: No lesion or rash.   Neurological:      General: No focal deficit present.      Mental Status: She is alert and oriented to person, place, and time. Mental status is at baseline.      Cranial Nerves: No cranial nerve deficit.   Psychiatric:         Mood and Affect: Mood normal.         Behavior: Behavior normal.         Thought Content: Thought content normal.         Judgment: Judgment normal.          Current Meds   apixaban, 5 mg, oral, BID  atorvastatin, 80 mg, oral, Nightly  B complex-vitamin C-folic acid, 1 capsule, oral, Daily  calcium acetate, 1,334 mg, oral, TID  carvedilol, 37.5 mg, oral, BID  [Transfer Hold] cloNIDine, 0.1 mg, oral, TID  fluticasone, 2 spray, Each Nostril, Daily  fluticasone furoate-vilanteroL, 1 puff, inhalation, Daily  isosorbide mononitrate ER, 120 mg, oral, Daily  [Transfer Hold] lidocaine, 5 mL, infiltration, Once  NIFEdipine ER, 90 mg, oral, Daily before breakfast  [Transfer Hold] sodium zirconium cyclosilicate, 10 g, oral, Once  [Held by provider] valsartan, 80 mg, oral, Daily       PRN medications: acetaminophen, albuterol, benzonatate, dicyclomine, [Transfer Hold] guaiFENesin, hydrALAZINE, oxygen, polyethylene glycol, sodium chloride     LABS and IMAGING     WBC   Date Value Ref Range Status   07/23/2024 5.6 4.4 - 11.3 x10*3/uL Final   07/22/2024 10.4 4.4 - 11.3 x10*3/uL Final     Hemoglobin   Date Value Ref Range Status   07/23/2024 8.6 (L) 12.0 - 16.0 g/dL Final   07/22/2024 9.6 (L) 12.0 - 16.0 g/dL Final     Hematocrit   Date Value Ref Range Status   07/23/2024 29.0 (L) 36.0 - 46.0 % Final    07/22/2024 31.1 (L) 36.0 - 46.0 % Final     Bicarbonate   Date Value Ref Range Status   07/23/2024 24 21 - 32 mmol/L Final   07/22/2024 26 21 - 32 mmol/L Final   07/22/2024 27 21 - 32 mmol/L Final     Creatinine   Date Value Ref Range Status   07/23/2024 10.28 (H) 0.50 - 1.05 mg/dL Final   07/22/2024 9.62 (H) 0.50 - 1.05 mg/dL Final   07/22/2024 9.07 (H) 0.50 - 1.05 mg/dL Final     Calcium   Date Value Ref Range Status   07/23/2024 8.5 (L) 8.6 - 10.6 mg/dL Final   07/22/2024 9.5 8.6 - 10.6 mg/dL Final   07/22/2024 9.5 8.6 - 10.6 mg/dL Final       Electrocardiogram, 12-lead PRN ACS symptoms  Sinus tachycardia  Inferior infarct , age undetermined  Marked ST abnormality, possible lateral subendocardial injury  Abnormal ECG  When compared with ECG of 08-JUL-2024 09:24,  Inferior infarct is now Present  ST now depressed in Inferior leads  ST more depressed in Lateral leads  T wave inversion now evident in Inferior leads  T wave inversion more evident in Lateral leads  Confirmed by Nate He ([id number]) on 7/23/2024 6:01:26 AM       ASSESSMENT / PLANS    Plan:  #Headache  -secondary to hypertension  Plan:  -will c/w migraine cocktail      #Hypertensive emergency  #Pulmonary edema  ::Presented with /120 and SOB  ::s/p nitroglycerin gtt  -continue home regimen: imdur 120 mg, nifedipine 90 mg; coreg 37.5 BID; hydral 25 mg TID PRN FOR SBP >180; will restart valsartan 80 mg today; will consider restarting clonidine 0.1 TID if still elevated in AM      #HFpEF  -Echo 6/2023 with EF 55-60%  -Limited with GDMT given ESRD   -Toresmide on non-dialysis days  -medication reconciliation and optimization      #Hx of Brachial DVT  -Continue Eliquis 5 mg BID     #AHRF 2/2 volume overload/moderate pulmonary edema  -c/w IHD   -nephrology consulted  -goal O2 sat >94%, titrate oxygen as needed  -wean O2 as tolerated, currently on 1L      #COPD ( 7/2022: FEV1/FVC 75.22. Mild obstructive pattern)  -following with pulm fellow clinic at  ,  Plan:   -continue home albuterol prn and breo ellipta     #ESRD on HD TTS   #Hypertensive emergency  #Flash pulmonary edema   -Access: RUE AVF   -HD unit: [facility]  -Primary nephrologist: Dr Garcia  -EDW: Uncertain, 54 kg from outpatient record  Plan:  -Nephrology following   -c/w IHD      #T2DM c/b retinopathy, nephropathy and neuropathy  -no medications  -last A1c 5.2 (6/29/24)  Plan:   Continue with her current diet      #Anemia of chronic disease I/s/o ESRD  -on Epo outpatient   Plan:  -continue on home medication and EPO outpatient     MUSCULOSKELETAL/ SKIN:  ZAIN     INFECTIOUS DISEASE:  ZAIN    F: 1.8L fluid restriction   E: PRN   N: Renal diet   GI: none     Code status: Full   HPOA: Hai Pinedo (spouse) [phone]   Dispo: possible discharge in on 7/24; pending PT/OT    Raquel Carlson MD

## 2024-07-23 NOTE — CARE PLAN
Problem: Skin  Goal: Participates in plan/prevention/treatment measures  7/22/2024 2124 by Krista Vega RN  Outcome: Progressing  7/22/2024 2124 by Krista Vega RN  Outcome: Progressing  7/22/2024 2123 by Krista Vega RN  Outcome: Progressing

## 2024-07-24 ENCOUNTER — APPOINTMENT (OUTPATIENT)
Dept: CARDIOLOGY | Facility: HOSPITAL | Age: 54
End: 2024-07-24
Payer: COMMERCIAL

## 2024-07-24 ENCOUNTER — DOCUMENTATION (OUTPATIENT)
Dept: BEHAVIORAL HEALTH | Facility: CLINIC | Age: 54
End: 2024-07-24
Payer: COMMERCIAL

## 2024-07-24 LAB
ALBUMIN SERPL BCP-MCNC: 3.9 G/DL (ref 3.4–5)
ANION GAP SERPL CALC-SCNC: 18 MMOL/L (ref 10–20)
ATRIAL RATE: 83 BPM
BASOPHILS # BLD AUTO: 0.07 X10*3/UL (ref 0–0.1)
BASOPHILS NFR BLD AUTO: 1.3 %
BUN SERPL-MCNC: 39 MG/DL (ref 6–23)
CALCIUM SERPL-MCNC: 9.5 MG/DL (ref 8.6–10.6)
CHLORIDE SERPL-SCNC: 97 MMOL/L (ref 98–107)
CO2 SERPL-SCNC: 27 MMOL/L (ref 21–32)
CREAT SERPL-MCNC: 5.96 MG/DL (ref 0.5–1.05)
DACRYOCYTES BLD QL SMEAR: NORMAL
EGFRCR SERPLBLD CKD-EPI 2021: 8 ML/MIN/1.73M*2
EOSINOPHIL # BLD AUTO: 0.43 X10*3/UL (ref 0–0.7)
EOSINOPHIL NFR BLD AUTO: 8.3 %
ERYTHROCYTE [DISTWIDTH] IN BLOOD BY AUTOMATED COUNT: 20.1 % (ref 11.5–14.5)
GLUCOSE SERPL-MCNC: 78 MG/DL (ref 74–99)
HCT VFR BLD AUTO: 33.2 % (ref 36–46)
HGB BLD-MCNC: 9.7 G/DL (ref 12–16)
IMM GRANULOCYTES # BLD AUTO: 0.02 X10*3/UL (ref 0–0.7)
IMM GRANULOCYTES NFR BLD AUTO: 0.4 % (ref 0–0.9)
LYMPHOCYTES # BLD AUTO: 1.18 X10*3/UL (ref 1.2–4.8)
LYMPHOCYTES NFR BLD AUTO: 22.7 %
MAGNESIUM SERPL-MCNC: 2.39 MG/DL (ref 1.6–2.4)
MCH RBC QN AUTO: 28.2 PG (ref 26–34)
MCHC RBC AUTO-ENTMCNC: 29.2 G/DL (ref 32–36)
MCV RBC AUTO: 97 FL (ref 80–100)
MONOCYTES # BLD AUTO: 0.52 X10*3/UL (ref 0.1–1)
MONOCYTES NFR BLD AUTO: 10 %
NEUTROPHILS # BLD AUTO: 2.98 X10*3/UL (ref 1.2–7.7)
NEUTROPHILS NFR BLD AUTO: 57.3 %
NRBC BLD-RTO: 0 /100 WBCS (ref 0–0)
OVALOCYTES BLD QL SMEAR: NORMAL
P AXIS: 43 DEGREES
P OFFSET: 190 MS
P ONSET: 144 MS
PHOSPHATE SERPL-MCNC: 5 MG/DL (ref 2.5–4.9)
PLATELET # BLD AUTO: 248 X10*3/UL (ref 150–450)
POLYCHROMASIA BLD QL SMEAR: NORMAL
POTASSIUM SERPL-SCNC: 4.9 MMOL/L (ref 3.5–5.3)
PR INTERVAL: 150 MS
Q ONSET: 219 MS
QRS COUNT: 14 BEATS
QRS DURATION: 80 MS
QT INTERVAL: 382 MS
QTC CALCULATION(BAZETT): 448 MS
QTC FREDERICIA: 425 MS
R AXIS: 9 DEGREES
RBC # BLD AUTO: 3.44 X10*6/UL (ref 4–5.2)
RBC MORPH BLD: NORMAL
SODIUM SERPL-SCNC: 137 MMOL/L (ref 136–145)
T AXIS: 166 DEGREES
T OFFSET: 410 MS
VENTRICULAR RATE: 83 BPM
WBC # BLD AUTO: 5.2 X10*3/UL (ref 4.4–11.3)

## 2024-07-24 PROCEDURE — 2500000002 HC RX 250 W HCPCS SELF ADMINISTERED DRUGS (ALT 637 FOR MEDICARE OP, ALT 636 FOR OP/ED): Performed by: STUDENT IN AN ORGANIZED HEALTH CARE EDUCATION/TRAINING PROGRAM

## 2024-07-24 PROCEDURE — 2500000001 HC RX 250 WO HCPCS SELF ADMINISTERED DRUGS (ALT 637 FOR MEDICARE OP): Performed by: STUDENT IN AN ORGANIZED HEALTH CARE EDUCATION/TRAINING PROGRAM

## 2024-07-24 PROCEDURE — 93010 ELECTROCARDIOGRAM REPORT: CPT | Performed by: INTERNAL MEDICINE

## 2024-07-24 PROCEDURE — 94640 AIRWAY INHALATION TREATMENT: CPT

## 2024-07-24 PROCEDURE — 80069 RENAL FUNCTION PANEL: CPT | Performed by: STUDENT IN AN ORGANIZED HEALTH CARE EDUCATION/TRAINING PROGRAM

## 2024-07-24 PROCEDURE — 2500000004 HC RX 250 GENERAL PHARMACY W/ HCPCS (ALT 636 FOR OP/ED): Performed by: STUDENT IN AN ORGANIZED HEALTH CARE EDUCATION/TRAINING PROGRAM

## 2024-07-24 PROCEDURE — 99233 SBSQ HOSP IP/OBS HIGH 50: CPT | Performed by: STUDENT IN AN ORGANIZED HEALTH CARE EDUCATION/TRAINING PROGRAM

## 2024-07-24 PROCEDURE — 99233 SBSQ HOSP IP/OBS HIGH 50: CPT | Performed by: NURSE PRACTITIONER

## 2024-07-24 PROCEDURE — 93005 ELECTROCARDIOGRAM TRACING: CPT

## 2024-07-24 PROCEDURE — 85025 COMPLETE CBC W/AUTO DIFF WBC: CPT | Performed by: STUDENT IN AN ORGANIZED HEALTH CARE EDUCATION/TRAINING PROGRAM

## 2024-07-24 PROCEDURE — 1100000001 HC PRIVATE ROOM DAILY

## 2024-07-24 PROCEDURE — 83735 ASSAY OF MAGNESIUM: CPT | Performed by: STUDENT IN AN ORGANIZED HEALTH CARE EDUCATION/TRAINING PROGRAM

## 2024-07-24 PROCEDURE — 36415 COLL VENOUS BLD VENIPUNCTURE: CPT | Performed by: STUDENT IN AN ORGANIZED HEALTH CARE EDUCATION/TRAINING PROGRAM

## 2024-07-24 RX ORDER — BISACODYL 10 MG/1
10 SUPPOSITORY RECTAL DAILY
Status: DISCONTINUED | OUTPATIENT
Start: 2024-07-24 | End: 2024-07-25 | Stop reason: HOSPADM

## 2024-07-24 RX ORDER — DIPHENHYDRAMINE HCL 25 MG
25 CAPSULE ORAL NIGHTLY PRN
COMMUNITY

## 2024-07-24 RX ORDER — ASPIRIN 81 MG/1
81 TABLET ORAL DAILY
COMMUNITY

## 2024-07-24 RX ORDER — HYDRALAZINE HYDROCHLORIDE 50 MG/1
100 TABLET, FILM COATED ORAL 3 TIMES DAILY
COMMUNITY

## 2024-07-24 RX ORDER — METOCLOPRAMIDE HYDROCHLORIDE 5 MG/ML
10 INJECTION INTRAMUSCULAR; INTRAVENOUS ONCE
Status: COMPLETED | OUTPATIENT
Start: 2024-07-24 | End: 2024-07-24

## 2024-07-24 RX ORDER — TORSEMIDE 20 MG/1
20 TABLET ORAL EVERY OTHER DAY
Status: DISCONTINUED | OUTPATIENT
Start: 2024-07-24 | End: 2024-07-25 | Stop reason: HOSPADM

## 2024-07-24 RX ORDER — DIPHENHYDRAMINE HYDROCHLORIDE 50 MG/ML
25 INJECTION INTRAMUSCULAR; INTRAVENOUS ONCE
Status: COMPLETED | OUTPATIENT
Start: 2024-07-24 | End: 2024-07-24

## 2024-07-24 RX ORDER — SUMATRIPTAN SUCCINATE 100 MG/1
100 TABLET ORAL ONCE
Status: COMPLETED | OUTPATIENT
Start: 2024-07-24 | End: 2024-07-24

## 2024-07-24 RX ORDER — PANTOPRAZOLE SODIUM 40 MG/1
40 TABLET, DELAYED RELEASE ORAL
Status: ON HOLD | COMMUNITY
End: 2024-07-26 | Stop reason: ALTCHOICE

## 2024-07-24 RX ORDER — HYDRALAZINE HYDROCHLORIDE 20 MG/ML
10 INJECTION INTRAMUSCULAR; INTRAVENOUS EVERY 6 HOURS PRN
Status: DISCONTINUED | OUTPATIENT
Start: 2024-07-24 | End: 2024-07-24

## 2024-07-24 RX ORDER — VALSARTAN 160 MG/1
160 TABLET ORAL DAILY
Status: DISCONTINUED | OUTPATIENT
Start: 2024-07-24 | End: 2024-07-25

## 2024-07-24 RX ORDER — HYDRALAZINE HYDROCHLORIDE 20 MG/ML
20 INJECTION INTRAMUSCULAR; INTRAVENOUS 3 TIMES DAILY PRN
Status: DISCONTINUED | OUTPATIENT
Start: 2024-07-24 | End: 2024-07-25 | Stop reason: HOSPADM

## 2024-07-24 RX ORDER — CLONIDINE HYDROCHLORIDE 0.1 MG/1
0.2 TABLET ORAL 3 TIMES DAILY
Status: DISCONTINUED | OUTPATIENT
Start: 2024-07-24 | End: 2024-07-25 | Stop reason: HOSPADM

## 2024-07-24 RX ORDER — SENNOSIDES 8.6 MG/1
2 TABLET ORAL 2 TIMES DAILY
Status: DISCONTINUED | OUTPATIENT
Start: 2024-07-24 | End: 2024-07-25 | Stop reason: HOSPADM

## 2024-07-24 RX ORDER — CARVEDILOL 25 MG/1
50 TABLET ORAL 2 TIMES DAILY
Status: DISCONTINUED | OUTPATIENT
Start: 2024-07-24 | End: 2024-07-25 | Stop reason: HOSPADM

## 2024-07-24 RX ORDER — TORSEMIDE 20 MG/1
20 TABLET ORAL EVERY OTHER DAY
Status: DISCONTINUED | OUTPATIENT
Start: 2024-07-24 | End: 2024-07-24

## 2024-07-24 ASSESSMENT — PAIN SCALES - GENERAL
PAINLEVEL_OUTOF10: 4
PAINLEVEL_OUTOF10: 0 - NO PAIN

## 2024-07-24 ASSESSMENT — COGNITIVE AND FUNCTIONAL STATUS - GENERAL
STANDING UP FROM CHAIR USING ARMS: A LITTLE
MOVING FROM LYING ON BACK TO SITTING ON SIDE OF FLAT BED WITH BEDRAILS: A LITTLE
CLIMB 3 TO 5 STEPS WITH RAILING: A LITTLE
TURNING FROM BACK TO SIDE WHILE IN FLAT BAD: A LITTLE
DRESSING REGULAR LOWER BODY CLOTHING: A LITTLE
TOILETING: A LITTLE
WALKING IN HOSPITAL ROOM: A LITTLE
MOVING FROM LYING ON BACK TO SITTING ON SIDE OF FLAT BED WITH BEDRAILS: A LITTLE
HELP NEEDED FOR BATHING: A LITTLE
STANDING UP FROM CHAIR USING ARMS: A LITTLE
PERSONAL GROOMING: A LITTLE
HELP NEEDED FOR BATHING: A LITTLE
CLIMB 3 TO 5 STEPS WITH RAILING: A LITTLE
DAILY ACTIVITIY SCORE: 18
TURNING FROM BACK TO SIDE WHILE IN FLAT BAD: A LITTLE
DRESSING REGULAR LOWER BODY CLOTHING: A LITTLE
DRESSING REGULAR UPPER BODY CLOTHING: A LITTLE
MOVING FROM LYING ON BACK TO SITTING ON SIDE OF FLAT BED WITH BEDRAILS: A LITTLE
TOILETING: A LITTLE
MOVING TO AND FROM BED TO CHAIR: A LITTLE
EATING MEALS: A LITTLE
MOBILITY SCORE: 18
MOVING TO AND FROM BED TO CHAIR: A LITTLE
MOBILITY SCORE: 18
DRESSING REGULAR UPPER BODY CLOTHING: A LITTLE
PERSONAL GROOMING: A LITTLE
WALKING IN HOSPITAL ROOM: A LITTLE
EATING MEALS: A LITTLE
DAILY ACTIVITIY SCORE: 18

## 2024-07-24 ASSESSMENT — PAIN - FUNCTIONAL ASSESSMENT: PAIN_FUNCTIONAL_ASSESSMENT: 0-10

## 2024-07-24 NOTE — PROGRESS NOTES
Stacey Heath is a 54 y.o. female on day 2 of admission presenting with Flash pulmonary edema (Multi).    Subjective   Pt resting in bed. Denies sob, n/v/d, fever, cough, chills, pain, chest pains, light head, dizziness, constipation       Objective     Physical Exam  Vitals and nursing note reviewed.   Cardiovascular:      Rate and Rhythm: Normal rate.   Pulmonary:      Comments: Mckinley lung sounds dim  Abdominal:      General: There is distension.      Comments: Minor dist-non-tender to palpation   Genitourinary:     Comments: States make little urine  Musculoskeletal:      Comments: Ble without edema   Skin:     General: Skin is warm and dry.   Neurological:      Mental Status: She is alert and oriented to person, place, and time.   Psychiatric:         Mood and Affect: Mood normal.         Behavior: Behavior normal.         Last Recorded Vitals  Blood pressure 176/78, pulse 74, temperature 36.3 °C (97.3 °F), resp. rate 16, weight 49.9 kg (110 lb), SpO2 93%.  Intake/Output last 3 Shifts:  I/O last 3 completed shifts:  In: 1816 (36.4 mL/kg) [P.O.:1016; I.V.:800 (16 mL/kg)]  Out: - (0 mL/kg)   Weight: 49.9 kg     Relevant Results    Scheduled medications  apixaban, 5 mg, oral, BID  atorvastatin, 80 mg, oral, Nightly  B complex-vitamin C-folic acid, 1 capsule, oral, Daily  bisacodyl, 10 mg, rectal, Daily  calcium acetate, 1,334 mg, oral, TID  carvedilol, 37.5 mg, oral, BID  cloNIDine, 0.2 mg, oral, TID  fluticasone, 2 spray, Each Nostril, Daily  fluticasone furoate-vilanteroL, 1 puff, inhalation, Daily  isosorbide mononitrate ER, 120 mg, oral, Daily  NIFEdipine ER, 90 mg, oral, Daily before breakfast  polyethylene glycol, 17 g, oral, Daily  sennosides, 2 tablet, oral, BID  SUMAtriptan, 100 mg, oral, Once  valsartan, 160 mg, oral, Daily      Continuous medications     PRN medications  PRN medications: acetaminophen, albuterol, benzonatate, dicyclomine, guaiFENesin, hydrALAZINE, oxygen, sodium chloride   Results for  orders placed or performed during the hospital encounter of 07/22/24 (from the past 24 hour(s))   Magnesium   Result Value Ref Range    Magnesium 2.39 1.60 - 2.40 mg/dL   Renal Function Panel   Result Value Ref Range    Glucose 78 74 - 99 mg/dL    Sodium 137 136 - 145 mmol/L    Potassium 4.9 3.5 - 5.3 mmol/L    Chloride 97 (L) 98 - 107 mmol/L    Bicarbonate 27 21 - 32 mmol/L    Anion Gap 18 10 - 20 mmol/L    Urea Nitrogen 39 (H) 6 - 23 mg/dL    Creatinine 5.96 (H) 0.50 - 1.05 mg/dL    eGFR 8 (L) >60 mL/min/1.73m*2    Calcium 9.5 8.6 - 10.6 mg/dL    Phosphorus 5.0 (H) 2.5 - 4.9 mg/dL    Albumin 3.9 3.4 - 5.0 g/dL   CBC and Auto Differential   Result Value Ref Range    WBC 5.2 4.4 - 11.3 x10*3/uL    nRBC 0.0 0.0 - 0.0 /100 WBCs    RBC 3.44 (L) 4.00 - 5.20 x10*6/uL    Hemoglobin 9.7 (L) 12.0 - 16.0 g/dL    Hematocrit 33.2 (L) 36.0 - 46.0 %    MCV 97 80 - 100 fL    MCH 28.2 26.0 - 34.0 pg    MCHC 29.2 (L) 32.0 - 36.0 g/dL    RDW 20.1 (H) 11.5 - 14.5 %    Platelets 248 150 - 450 x10*3/uL    Neutrophils % 57.3 40.0 - 80.0 %    Immature Granulocytes %, Automated 0.4 0.0 - 0.9 %    Lymphocytes % 22.7 13.0 - 44.0 %    Monocytes % 10.0 2.0 - 10.0 %    Eosinophils % 8.3 0.0 - 6.0 %    Basophils % 1.3 0.0 - 2.0 %    Neutrophils Absolute 2.98 1.20 - 7.70 x10*3/uL    Immature Granulocytes Absolute, Automated 0.02 0.00 - 0.70 x10*3/uL    Lymphocytes Absolute 1.18 (L) 1.20 - 4.80 x10*3/uL    Monocytes Absolute 0.52 0.10 - 1.00 x10*3/uL    Eosinophils Absolute 0.43 0.00 - 0.70 x10*3/uL    Basophils Absolute 0.07 0.00 - 0.10 x10*3/uL   Morphology   Result Value Ref Range    RBC Morphology See Below     Polychromasia Mild     Ovalocytes Few     Teardrop Cells Few                    Assessment/Plan   Principal Problem:    Flash pulmonary edema (Multi)    Tolerate hemodialysis yesterday with net fluid loss 1350cc    Bp (160//88) with tx, euvolemic on exam and has stable electrolytes . K+=4.9     Outpatient Dialysis schedule:  TTS  [facility]/Dr Garcia     Access: rt fist with +thrill/bruit- no issues - able to achieve   Lt arm midline     Anemia of ESRD:   epoetin joceline (Epogen,Procrit) injection 10,000 Units on dialysis days... current hgb 9.7... will cont to monitor     CKD-MBD Phosphate Binder:B complex-vitamin C-folic acid (Nephrocaps) capsule 1 capsule daily, calcium acetate (Phoslo) capsule 1,334 mg tid ac     Plan HD tomorrow with UF as tolerated     Renal diet      Please obtain daily standing wt (if possible)     Medication to be adjusted for ESRD      Patient to continue regular HD schedule while inpatient and to follow with the outpatient nephrologist at discharge        LILLY Rebollar-CNP

## 2024-07-24 NOTE — PROGRESS NOTES
Stacey Heath  Age: 54 y.o.  MRN: [id number]  Date: 7/23/2024  Location of service: phone call and chart review    Program Details  Medicaid Community Clinical Case Management  Status: Enrolled  Effective Dates: 10/17/2023 - present  Responsible Staff: NAREN Davidson      Goals Reviewed:      Summary:  This writer called patient to check and see if she was in dialysis. Patient states she should be out of dialysis by 2pm. This writer will plan to go see patient around 2pm.  This writer did a chart review to assess what has been written by the inpatient team.    Appointment start time: 1310  Appointment completion time: 1328  Total time spent with patient (in minutes): 18      Roberta Gallego RN

## 2024-07-24 NOTE — PROGRESS NOTES
Pharmacy Medication History Review    Stacey Heath is a 54 y.o. female admitted for Flash pulmonary edema (Multi). Pharmacy reviewed the patient's [id number] medications and allergies for accuracy.    Medications ADDED:  Pantoprazole  Aspirin  Banophen  Hydralazine    Medications CHANGED:  None    Medications REMOVED:   None       The list below reflects the updated PTA list. Comments regarding how patient may be taking medications differently can be found in the Admit Orders Activity  Prior to Admission Medications   Prescriptions Last Dose Informant   B complex-vitamin C-folic acid (Nephrocaps) 1 mg capsule Past Week Self, Other   Sig: Take 1 capsule by mouth once daily.   NIFEdipine ER (Adalat CC) 90 mg 24 hr tablet Past Week Self, Other   Sig: Take 1 tablet (90 mg) by mouth once daily in the morning. Take before meals. Do not crush, chew, or split.   acetaminophen (Tylenol) 325 mg tablet Unknown Self, Other   Sig: Take 2 tablets (650 mg) by mouth every 4 hours if needed for mild pain (1 - 3) or moderate pain (4 - 6).   albuterol 1.25 mg/3 mL nebulizer solution Past Month Self, Other   Sig: Take 3 mL (1.25 mg) by nebulization every 6 hours if needed for wheezing.   albuterol sulfate (Proair Digihaler) 90 mcg/actuation aero powdr breath act w/sensor inhaler Past Month Self, Other   Sig: Inhale 2 puffs 2 times a day as needed for wheezing or shortness of breath.   apixaban (Eliquis) 5 mg tablet 7/20/2024 Self   Sig: Take 1 tablet (5 mg) by mouth 2 times a day.   aspirin 81 mg EC tablet 7/20/2024 Self   Sig: Take 1 tablet (81 mg) by mouth once daily.   atorvastatin (Lipitor) 80 mg tablet 7/20/2024 Self, Other   Sig: Take 1 tablet (80 mg) by mouth once daily at bedtime.   benzonatate (Tessalon) 100 mg capsule Past Week Self, Other   Sig: Take 1 capsule (100 mg) by mouth 3 times a day as needed for cough. Do not crush or chew.   calcium acetate (Phoslo) 667 mg capsule 7/20/2024 Self, Other   Sig: Take 2  capsules (1,334 mg) by mouth 3 times daily (morning, midday, late afternoon).   carvedilol (Coreg) 12.5 mg tablet Past Week Self, Other   Sig: Take 3 tablets (37.5 mg) by mouth 2 times a day.   cloNIDine (Catapres) 0.1 mg tablet Past Week Self, Other   Sig: Take 1 tablet (0.1 mg) by mouth 3 times a day.   diphenhydrAMINE (BENADryl) 25 mg capsule  Self   Sig: Take 1 capsule (25 mg) by mouth as needed at bedtime for itching or sleep.   epoetin joceline (Epogen,Procrit) 10,000 unit/mL injection  Self, Other   Sig: Inject 1 mL (10,000 Units) under the skin 3 times a week.   fluticasone (Flonase) 50 mcg/actuation nasal spray More than a month Self, Other   Sig: Administer 2 sprays into each nostril once daily. Shake gently. Before first use, prime pump. After use, clean tip and replace cap.   Patient not taking: Reported on 7/22/2024   fluticasone furoate-vilanteroL (Breo Ellipta) 100-25 mcg/dose inhaler Past Month Self, Other   Sig: Inhale 1 puff once daily.   hydrALAZINE (Apresoline) 100 mg tablet  Self   Sig: Take 1 tablet (100 mg) by mouth 2 times a day.   isosorbide mononitrate ER (Imdur) 120 mg 24 hr tablet Past Week Self, Other   Sig: Take 1 tablet (120 mg) by mouth once daily. Do not crush or chew.   pantoprazole (ProtoNix) 40 mg EC tablet 7/20/2024 Self   Sig: Take 1 tablet (40 mg) by mouth once daily in the morning. Take before meals. Do not crush, chew, or split.   sodium chloride (Ocean) 0.65 % nasal spray Past Month Self, Other   Sig: Administer 1 spray into each nostril 4 times a day as needed for congestion.   torsemide (Demadex) 20 mg tablet Past Month Self, Other   Sig: Take 2 tablets once daily on non-dialysis days only. Do not take on dialysis days. Do not start before July 3, 2024.   valsartan (Diovan) 80 mg tablet Past Week Self, Other   Sig: Take 1 tablet (80 mg) by mouth once daily.      Facility-Administered Medications: None        The list below reflects the updated allergy list. Please review each  documented allergy for additional clarification and justification.  Allergies  Reviewed by Viri Gallagher PharmD on 7/22/2024        Severity Reactions Comments    Iodine High Hives, Itching, Unknown     Bioflavonoids Not Specified Swelling     Codeine Not Specified Itching, Hives, Unknown Tolerates percocet   Tolerates percocet Tolerates percocet    Flowers Not Specified Itching     Hydromorphone Not Specified Itching     Shellfish Containing Products Not Specified Swelling SEAFOOD            Patient accepts M2B at discharge. Pharmacy has been updated to [facility].    Sources used to complete the med history include:   [facility]  Outpatient fill history  Interview with patient ( pleasant, had all medications in a bag)  Notes from prior MedRec 07/22/24 (Vrii Gallagher PharmD)      Below are additional concerns with the patient's PTA list:  ~ None      Shannon Parekh [facility]  Transitions of Care Technician  [facility]  Please reach out via Secure Chat for questions, or if no response call [phone] or [employer] [facility]

## 2024-07-24 NOTE — PROGRESS NOTES
"Stacey Heath  Age: 54 y.o.  MRN: [id number]  Date: 7/23/2024  Location of service: in community    Program Details  Medicaid Community Clinical Case Management  Status: Enrolled  Effective Dates: 10/17/2023 - present  Responsible Staff: NAREN Davidson      Goals Reviewed:  Problem: Risk of Uncoordinated Care       Goal: Care will be Coordinated and Supported by a Multidisciplinary Team of Providers       Priority: High          Summary:  This writer visited patient in the hospital. Patient was discharged on Thursday and readmitted on Sunday d/t problems with her breathing. Patient states they believe this is because of her having COVID last week.  Patient states her mother-in-law is staying with her right now because her mother-in-law's brother [place] [facility] with [place] concerns.   Patient states she is doing well in the hospital and states, \"I can't complain.\"  Patient states she has been following her fluid restriction at home and states she is afraid to stray away from it.   Patient states her fiancée will be bringing up all of medications to the hospital so they can do a med rec here and discard the medications they want to eliminate.   Patient states [facility] called [facility] to get patient transferred over to [facility] for dialysis and she states she is waiting on [facility] to get back to them.   This writer will meet patient at [facility] on 8/6/24 to discuss the transition with them.  This writer will meet with patient on Friday 7/26/24.    Appointment start time: 1350  Appointment completion time: 1450  Total time spent with patient (in minutes): 60      Roberta Gallego RN    "

## 2024-07-24 NOTE — PROGRESS NOTES
INTERNAL MEDICINE PROGRESS NOTE     BRIEF NARRATIVE    Stacey Heath is a 53 y.o. 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  Upon admission to MICU, / 90 , HR 85, Sat o2 100, on 2 lit NC. No cp, no n/v and she stated she feels better and doesn't feel SOB when sitting. Discontinued Nitroglycerin and started on home medications, nephrology consulted for indication of emergent HD, based on nephrology patient started on IUF , however he BP dropped to SBP to 80s after 400 ml IUF stopped the treatment and planned for IHD. PT transferred to floors on 7/22 for ongoing management. Will continue IHD, currently on 1L O2, will plan to wean as tolerated.     SUBJECTIVE     Pt was seen and examined at beside. No acute events overnight.  Still having migraines.    OBJECTIVE      Visit Vitals  /78   Pulse 74   Temp 36.3 °C (97.3 °F)   Resp 16        Intake/Output Summary (Last 24 hours) at 7/24/2024 1542  Last data filed at 7/23/2024 1730  Gross per 24 hour   Intake 120 ml   Output --   Net 120 ml       Physical Exam  Constitutional:       Appearance: Normal appearance.   HENT:      Head: Normocephalic and atraumatic.   Cardiovascular:      Rate and Rhythm: Normal rate and regular rhythm.      Heart sounds: No murmur heard.  Pulmonary:      Effort: Pulmonary effort is normal. No respiratory distress.      Breath sounds: Normal breath sounds and air entry. No stridor. No wheezing or rhonchi.   Abdominal:      General: Abdomen is flat. Bowel sounds  are normal. There is no distension.      Palpations: Abdomen is soft. There is no mass.      Tenderness: There is no abdominal tenderness.   Musculoskeletal:         General: No swelling. Normal range of motion.      Right lower leg: No edema.      Left lower leg: No edema.   Skin:     General: Skin is warm and dry.      Capillary Refill: Capillary refill takes less than 2 seconds.      Findings: No lesion or rash.   Neurological:      General: No focal deficit present.      Mental Status: She is alert and oriented to person, place, and time. Mental status is at baseline.      Cranial Nerves: No cranial nerve deficit.   Psychiatric:         Mood and Affect: Mood normal.         Behavior: Behavior normal.         Thought Content: Thought content normal.         Judgment: Judgment normal.          Current Meds   apixaban, 5 mg, oral, BID  atorvastatin, 80 mg, oral, Nightly  B complex-vitamin C-folic acid, 1 capsule, oral, Daily  bisacodyl, 10 mg, rectal, Daily  calcium acetate, 1,334 mg, oral, TID  carvedilol, 37.5 mg, oral, BID  cloNIDine, 0.2 mg, oral, TID  fluticasone, 2 spray, Each Nostril, Daily  fluticasone furoate-vilanteroL, 1 puff, inhalation, Daily  isosorbide mononitrate ER, 120 mg, oral, Daily  NIFEdipine ER, 90 mg, oral, Daily before breakfast  polyethylene glycol, 17 g, oral, Daily  sennosides, 2 tablet, oral, BID  valsartan, 160 mg, oral, Daily       PRN medications: acetaminophen, albuterol, benzonatate, dicyclomine, guaiFENesin, hydrALAZINE, oxygen, sodium chloride     LABS and IMAGING     WBC   Date Value Ref Range Status   07/24/2024 5.2 4.4 - 11.3 x10*3/uL Final   07/23/2024 5.6 4.4 - 11.3 x10*3/uL Final   07/22/2024 10.4 4.4 - 11.3 x10*3/uL Final     Hemoglobin   Date Value Ref Range Status   07/24/2024 9.7 (L) 12.0 - 16.0 g/dL Final   07/23/2024 8.6 (L) 12.0 - 16.0 g/dL Final   07/22/2024 9.6 (L) 12.0 - 16.0 g/dL Final     Hematocrit   Date Value Ref Range Status   07/24/2024 33.2 (L) 36.0 - 46.0  % Final   07/23/2024 29.0 (L) 36.0 - 46.0 % Final   07/22/2024 31.1 (L) 36.0 - 46.0 % Final     Bicarbonate   Date Value Ref Range Status   07/24/2024 27 21 - 32 mmol/L Final   07/23/2024 24 21 - 32 mmol/L Final   07/22/2024 26 21 - 32 mmol/L Final     Creatinine   Date Value Ref Range Status   07/24/2024 5.96 (H) 0.50 - 1.05 mg/dL Final   07/23/2024 10.28 (H) 0.50 - 1.05 mg/dL Final   07/22/2024 9.62 (H) 0.50 - 1.05 mg/dL Final     Calcium   Date Value Ref Range Status   07/24/2024 9.5 8.6 - 10.6 mg/dL Final   07/23/2024 8.5 (L) 8.6 - 10.6 mg/dL Final   07/22/2024 9.5 8.6 - 10.6 mg/dL Final            ASSESSMENT / PLANS    Plan:  #Headache  -secondary to hypertension  Plan:  -will c/w migraine cocktail   -sumatriptan x1      #Hypertensive emergency  #Pulmonary edema  ::Presented with /120 and SOB  ::s/p nitroglycerin gtt  -continue home regimen: imdur 120 mg, nifedipine 90 mg; coreg 50 BID (from 37.5); hydral 20 mg IV TID PRN FOR SBP >180; valsartan 160 mg today (from 80 mg); clonidine 0.2 TID (from 0.1 mg TID), torsemide 20 mg on non-dialysis days   -pt will need renal referral for further work-up given resistant HTN      #HFpEF  -Echo 6/2023 with EF 55-60%  -Limited with GDMT given ESRD   -Toresmide on non-dialysis days  -medication reconciliation and optimization      #Hx of Brachial DVT  -Continue Eliquis 5 mg BID     #AHRF 2/2 volume overload/moderate pulmonary edema  -c/w IHD   -nephrology consulted  -goal O2 sat >94%, titrate oxygen as needed  -on RA      #COPD ( 7/2022: FEV1/FVC 75.22. Mild obstructive pattern)  -following with pulm fellow clinic at ,  Plan:   -continue home albuterol prn and breo ellipta     #ESRD on HD TTS   #Hypertensive emergency  #Flash pulmonary edema   -Access: RUE AVF   -HD unit: [facility]  -Primary nephrologist: Dr Garcia  -EDW: Uncertain, 54 kg from outpatient record  Plan:  -Nephrology following   -c/w IHD      #T2DM c/b retinopathy, nephropathy and neuropathy  -no  medications  -last A1c 5.2 (6/29/24)  Plan:   Continue with her current diet      #Anemia of chronic disease I/s/o ESRD  -on Epo outpatient   Plan:  -continue on home medication and EPO outpatient    #Constipation   -senna BID  -bisacodyl suppository   -miralax daily        F: 1.8L fluid restriction   E: PRN   N: Renal diet   GI: none     Code status: Full   HPOA: Hai Pinedo (spouse) [phone]   Dispo: possible discharge in on 7/24; pending PT/OT    Raquel O MD Robyn

## 2024-07-24 NOTE — PROGRESS NOTES
Physical Therapy                 Therapy Communication Note    Patient Name: Stacey Heath  MRN: [id number]  Today's Date: 7/24/2024     Discipline: Physical Therapy    Missed Visit Reason: Missed Visit Reason: Patient placed on medical hold (Pt /75. RN made aware, will hold at this time.)    Missed Time: Attempt    Comment:

## 2024-07-24 NOTE — CARE PLAN
The patient's goals for the shift include      The clinical goals for the shift include Patient will remain HDS      Problem: Skin  Goal: Participates in plan/prevention/treatment measures  Outcome: Progressing  Goal: Prevent/manage excess moisture  Outcome: Progressing  Goal: Prevent/minimize sheer/friction injuries  Outcome: Progressing  Goal: Promote/optimize nutrition  Outcome: Progressing  Goal: Promote skin healing  Outcome: Progressing     Problem: Respiratory  Goal: Clear secretions with interventions this shift  Outcome: Progressing  Goal: Minimize anxiety/maximize coping throughout shift  Outcome: Progressing  Goal: Minimal/no exertional discomfort or dyspnea this shift  Outcome: Progressing  Goal: No signs of respiratory distress (eg. Use of accessory muscles. Peds grunting)  Outcome: Progressing  Goal: Patent airway maintained this shift  Outcome: Progressing  Goal: Tolerate mechanical ventilation evidenced by VS/agitation level this shift  Outcome: Progressing  Goal: Tolerate pulmonary toileting this shift  Outcome: Progressing  Goal: Verbalize decreased shortness of breath this shift  Outcome: Progressing  Goal: Wean oxygen to maintain O2 saturation per order/standard this shift  Outcome: Progressing  Goal: Increase self care and/or family involvement in next 24 hours  Outcome: Progressing

## 2024-07-25 ENCOUNTER — PATIENT OUTREACH (OUTPATIENT)
Dept: CARE COORDINATION | Facility: CLINIC | Age: 54
End: 2024-07-25
Payer: COMMERCIAL

## 2024-07-25 ENCOUNTER — PHARMACY VISIT (OUTPATIENT)
Dept: PHARMACY | Facility: CLINIC | Age: 54
End: 2024-07-25
Payer: MEDICARE

## 2024-07-25 ENCOUNTER — APPOINTMENT (OUTPATIENT)
Dept: DIALYSIS | Facility: HOSPITAL | Age: 54
End: 2024-07-25
Payer: COMMERCIAL

## 2024-07-25 VITALS
BODY MASS INDEX: 26.39 KG/M2 | SYSTOLIC BLOOD PRESSURE: 149 MMHG | OXYGEN SATURATION: 96 % | WEIGHT: 113.54 LBS | DIASTOLIC BLOOD PRESSURE: 67 MMHG | HEART RATE: 74 BPM | RESPIRATION RATE: 20 BRPM | TEMPERATURE: 97.3 F

## 2024-07-25 LAB — GLUCOSE BLD MANUAL STRIP-MCNC: 166 MG/DL (ref 74–99)

## 2024-07-25 PROCEDURE — 90935 HEMODIALYSIS ONE EVALUATION: CPT | Performed by: INTERNAL MEDICINE

## 2024-07-25 PROCEDURE — 8010000001 HC DIALYSIS - HEMODIALYSIS PER DAY

## 2024-07-25 PROCEDURE — 2500000002 HC RX 250 W HCPCS SELF ADMINISTERED DRUGS (ALT 637 FOR MEDICARE OP, ALT 636 FOR OP/ED): Performed by: STUDENT IN AN ORGANIZED HEALTH CARE EDUCATION/TRAINING PROGRAM

## 2024-07-25 PROCEDURE — RXMED WILLOW AMBULATORY MEDICATION CHARGE

## 2024-07-25 PROCEDURE — 2500000004 HC RX 250 GENERAL PHARMACY W/ HCPCS (ALT 636 FOR OP/ED): Performed by: STUDENT IN AN ORGANIZED HEALTH CARE EDUCATION/TRAINING PROGRAM

## 2024-07-25 PROCEDURE — 2500000001 HC RX 250 WO HCPCS SELF ADMINISTERED DRUGS (ALT 637 FOR MEDICARE OP): Performed by: STUDENT IN AN ORGANIZED HEALTH CARE EDUCATION/TRAINING PROGRAM

## 2024-07-25 PROCEDURE — 82947 ASSAY GLUCOSE BLOOD QUANT: CPT

## 2024-07-25 PROCEDURE — 99239 HOSP IP/OBS DSCHRG MGMT >30: CPT | Performed by: STUDENT IN AN ORGANIZED HEALTH CARE EDUCATION/TRAINING PROGRAM

## 2024-07-25 RX ORDER — VALSARTAN 320 MG/1
320 TABLET ORAL DAILY
Qty: 30 TABLET | Refills: 1 | Status: SHIPPED | OUTPATIENT
Start: 2024-07-25 | End: 2024-09-23

## 2024-07-25 RX ORDER — CLONIDINE HYDROCHLORIDE 0.2 MG/1
0.2 TABLET ORAL 3 TIMES DAILY
Qty: 90 TABLET | Refills: 1 | Status: SHIPPED | OUTPATIENT
Start: 2024-07-25 | End: 2024-09-23

## 2024-07-25 RX ORDER — CARVEDILOL 25 MG/1
50 TABLET ORAL 2 TIMES DAILY
Qty: 120 TABLET | Refills: 1 | Status: SHIPPED | OUTPATIENT
Start: 2024-07-25 | End: 2024-09-23

## 2024-07-25 RX ORDER — VALSARTAN 320 MG/1
320 TABLET ORAL DAILY
Status: DISCONTINUED | OUTPATIENT
Start: 2024-07-26 | End: 2024-07-25 | Stop reason: HOSPADM

## 2024-07-25 RX ORDER — POLYETHYLENE GLYCOL 3350 17 G/17G
17 POWDER, FOR SOLUTION ORAL DAILY
Qty: 510 G | Refills: 0 | Status: SHIPPED | OUTPATIENT
Start: 2024-07-26

## 2024-07-25 ASSESSMENT — COGNITIVE AND FUNCTIONAL STATUS - GENERAL
DAILY ACTIVITIY SCORE: 24
MOBILITY SCORE: 24

## 2024-07-25 ASSESSMENT — PAIN SCALES - GENERAL
PAINLEVEL_OUTOF10: 0 - NO PAIN
PAINLEVEL_OUTOF10: 2

## 2024-07-25 NOTE — CARE PLAN
The patient's goals for the shift include      The clinical goals for the shift include Patient blood pressure will remain low      Problem: Skin  Goal: Participates in plan/prevention/treatment measures  Outcome: Met  Goal: Prevent/manage excess moisture  Outcome: Met  Goal: Prevent/minimize sheer/friction injuries  Outcome: Met  Goal: Promote/optimize nutrition  Outcome: Met  Goal: Promote skin healing  Outcome: Met     Problem: Respiratory  Goal: Clear secretions with interventions this shift  Outcome: Met  Goal: Minimize anxiety/maximize coping throughout shift  Outcome: Met  Goal: Minimal/no exertional discomfort or dyspnea this shift  Outcome: Met  Goal: No signs of respiratory distress (eg. Use of accessory muscles. Peds grunting)  Outcome: Met  Goal: Patent airway maintained this shift  Outcome: Met  Goal: Tolerate mechanical ventilation evidenced by VS/agitation level this shift  Outcome: Met  Goal: Tolerate pulmonary toileting this shift  Outcome: Met  Goal: Verbalize decreased shortness of breath this shift  Outcome: Met  Goal: Wean oxygen to maintain O2 saturation per order/standard this shift  Outcome: Met  Goal: Increase self care and/or family involvement in next 24 hours  Outcome: Met

## 2024-07-25 NOTE — DISCHARGE INSTRUCTIONS
Hi Ms. Heath,     Thanks for coming to  for your care! As you know, you came in with really high blood pressure and shortness of breath and required a stay in the ICU for IV blood pressure meds and dialysis. While you have been here, we have been trying to control your blood pressure by trialing different dosages to get it more under control. We are sending you home on a new blood pressure regimen which should hopefully help control this better. This will be in the discharge paperwork regarding the dosages. I have also gone through your current medication bag to remove all the old ones. I have set you up with appointments with your pimary care doctor (appt 8/2) and a kidney doctor to help with your blood pressure even more. Best of luck! If you don't hear from someone to schedule your appt, please call [phone] to schedule.    Your  Medicine Team

## 2024-07-25 NOTE — CARE PLAN
The patient's goals for the shift include  adequate comfort maintained    The clinical goals for the shift include Patient will remain HDS

## 2024-07-25 NOTE — PROGRESS NOTES
Physical Therapy                 Therapy Communication Note    Patient Name: Stacey Heath  MRN: [id number]  Today's Date: 7/25/2024     Discipline: Physical Therapy    Missed Visit Reason: Missed Visit Reason:  (@751 pt currently off the floor at dialysis)    Missed Time: Attempt      07/25/24 at 7:52 AM - Lazara Carter, PT

## 2024-07-25 NOTE — NURSING NOTE
Report from Sending RN:    Report From: Malika ISAAC   Recent Surgery of Procedure: No  Baseline Level of Consciousness (LOC): A/O x4  Oxygen Use: No- RA   Type: N/A  Diabetic: No  Last BP Med Given Day of Dialysis: Advised to give if   Last Pain Med Given: No  Lab Tests to be Obtained with Dialysis: No   Blood Transfusion to be Given During Dialysis: No  Available IV Access: Yes, 18g Left EJ   Medications to be Administered During Dialysis: No  Continuous IV Infusion Running: No  Restraints on Currently or in the Last 24 Hours: No  Hand-Off Communication: L Arm DVT - Eliquis. HTN   Dialysis Catheter Dressing: N/A  Last Dressing Change: N/A

## 2024-07-25 NOTE — PROGRESS NOTES
DISCHARGE MEDICATION REVIEW BY PHARMACY     NAME:  Stacey Heath   MRN:  [id number]   SERVICE DATE: 7/25/2024   SERVICE TIME:  12:18 PM       The following discharge medications were reviewed by a pharmacist: Yes  The following recommendations were made: N/A  Dispo: home       Medication List      START taking these medications     polyethylene glycol 17 gram/dose powder; Commonly known as: Glycolax,   Miralax; Take 17 g (1 scoop dissolved in liquid) by mouth once daily. Do   not start before July 26, 2024.; Start taking on: July 26, 2024     CHANGE how you take these medications     carvedilol 25 mg tablet; Commonly known as: Coreg; Take 2 tablets (50   mg) by mouth 2 times a day.; What changed: medication strength, how much   to take   cloNIDine 0.2 mg tablet; Commonly known as: Catapres; Take 1 tablet (0.2   mg) by mouth 3 times a day.; What changed: medication strength, how much   to take   valsartan 320 mg tablet; Commonly known as: Diovan; Take 1 tablet (320   mg) by mouth once daily.; What changed: medication strength, how much to   take     CONTINUE taking these medications     acetaminophen 325 mg tablet; Commonly known as: Tylenol; Take 2 tablets   (650 mg) by mouth every 4 hours if needed for mild pain (1 - 3) or   moderate pain (4 - 6).   * albuterol sulfate 90 mcg/actuation aero powdr breath act w/sensor   inhaler; Commonly known as: Proair Digihaler   * albuterol 1.25 mg/3 mL nebulizer solution; Take 3 mL (1.25 mg) by   nebulization every 6 hours if needed for wheezing.   apixaban 5 mg tablet; Commonly known as: Eliquis; Take 1 tablet (5 mg)   by mouth 2 times a day.   aspirin 81 mg EC tablet   atorvastatin 80 mg tablet; Commonly known as: Lipitor; Take 1 tablet (80   mg) by mouth once daily at bedtime.   benzonatate 100 mg capsule; Commonly known as: Tessalon; Take 1 capsule   (100 mg) by mouth 3 times a day as needed for cough. Do not crush or chew.   Breo Ellipta 100-25 mcg/dose inhaler; Generic  drug: fluticasone   furoate-vilanteroL; Inhale 1 puff once daily.   calcium acetate 667 mg capsule; Commonly known as: Phoslo; Take 2   capsules (1,334 mg) by mouth 3 times daily (morning, midday, late   afternoon).   Deep Sea Nasal 0.65 % nasal spray; Generic drug: sodium chloride;   Administer 1 spray into each nostril 4 times a day as needed for   congestion.   diphenhydrAMINE 25 mg capsule; Commonly known as: BENADryl   epoetin joceline 10,000 unit/mL injection; Commonly known as:   Epogen,Procrit; Inject 1 mL (10,000 Units) under the skin 3 times a week.   hydrALAZINE 50 mg tablet; Commonly known as: Apresoline   isosorbide mononitrate  mg 24 hr tablet; Commonly known as: Imdur;   Take 1 tablet (120 mg) by mouth once daily. Do not crush or chew.   NIFEdipine ER 90 mg 24 hr tablet; Commonly known as: Adalat CC; Take 1   tablet (90 mg) by mouth once daily in the morning. Take before meals. Do   not crush, chew, or split.   Renal Caps 1 mg capsule; Generic drug: B complex-vitamin C-folic acid;   Take 1 capsule by mouth once daily.   torsemide 20 mg tablet; Commonly known as: Demadex; Take 2 tablets once   daily on non-dialysis days only. Do not take on dialysis days. Do not   start before July 3, 2024.  * This list has 2 medication(s) that are the same as other medications   prescribed for you. Read the directions carefully, and ask your doctor or   other care provider to review them with you.     ASK your doctor about these medications     fluticasone 50 mcg/actuation nasal spray; Commonly known as: Flonase;   Administer 2 sprays into each nostril once daily. Shake gently. Before   first use, prime pump. After use, clean tip and replace cap.       Mago Atkinson, PharmD, [facility]  [place] [place] [facility] Pharmacist

## 2024-07-25 NOTE — PROGRESS NOTES
Renal Staff HD Note    Patient seen, examined on dialysis.  Blood pressure is high.  Tolerating treatment without event    BP Readings from Last 3 Encounters:   07/25/24 (!) 198/80   07/18/24 (!) 199/82   07/01/24 171/72     [unfilled]  Lab Results   Component Value Date    CREATININE 5.96 (H) 07/24/2024    BUN 39 (H) 07/24/2024     07/24/2024    K 4.9 07/24/2024    CL 97 (L) 07/24/2024    CO2 27 07/24/2024     Lab Results   Component Value Date    PTH 1,415.0 (H) 05/31/2024    CALCIUM 9.5 07/24/2024    CAION 1.18 03/14/2023    PHOS 5.0 (H) 07/24/2024     @  Lab Results   Component Value Date    HGB 9.7 (L) 07/24/2024       - Continue treatment per submitted orders  -Increase valsartan dose to 320 mg once a day

## 2024-07-25 NOTE — PROGRESS NOTES
Patient to discharge home today. Patient with recent admission for  Hypertensive heart and chronic kidney disease with heart failure and with stage 5 chronic kidney disease, or end stage renal disease and readmitted with Flash pulmonary edema. Discussed with MD and referral to be made for Healthy at Home program. Patient has pcp appointment scheduled for 8/2.   Erin CARLSON, RN  [facility]itoinal Care Coordinator (TCC)  [phone]

## 2024-07-25 NOTE — PROGRESS NOTES
Attempted to reach pt to schedule appt with this RD without success. LVM with this RD's info. Pt is enc to call this RD at [phone] to make appt

## 2024-07-25 NOTE — NURSING NOTE
Report to Receiving RN:    Report To: Ana ISAAC  Time Report Called: [id number]  Hand-Off Communication: Pt tolerated HD with 2L fluid removal. Post bp: 195/80 p: 76  Complications During Treatment: No  Ultrafiltration Treatment: Yes  Medications Administered During Dialysis: No  Blood Products Administered During Dialysis: No  Labs Sent During Dialysis: No  Heparin Drip Rate Changes: N/A  Dialysis Catheter Dressing: ELI fistula  Last Dressing Change:       Last Updated: 9:40 AM by WALT APRIL

## 2024-07-26 ENCOUNTER — DOCUMENTATION (OUTPATIENT)
Dept: BEHAVIORAL HEALTH | Facility: CLINIC | Age: 54
End: 2024-07-26
Payer: COMMERCIAL

## 2024-07-26 ENCOUNTER — PATIENT OUTREACH (OUTPATIENT)
Dept: HOME HEALTH SERVICES | Age: 54
End: 2024-07-26
Payer: COMMERCIAL

## 2024-07-26 ENCOUNTER — PATIENT OUTREACH (OUTPATIENT)
Dept: CARE COORDINATION | Age: 54
End: 2024-07-26
Payer: COMMERCIAL

## 2024-07-26 ENCOUNTER — TELEPHONE (OUTPATIENT)
Dept: TRANSPLANT | Facility: HOSPITAL | Age: 54
End: 2024-07-26
Payer: COMMERCIAL

## 2024-07-26 RX ORDER — GABAPENTIN 300 MG/1
300 CAPSULE ORAL 3 TIMES DAILY
COMMUNITY

## 2024-07-26 RX ORDER — PANTOPRAZOLE SODIUM 40 MG/1
40 TABLET, DELAYED RELEASE ORAL
COMMUNITY
Start: 2024-07-26 | End: 2024-08-02

## 2024-07-26 SDOH — HEALTH STABILITY: MENTAL HEALTH
HOW OFTEN DO YOU NEED TO HAVE SOMEONE HELP YOU WHEN YOU READ INSTRUCTIONS, PAMPHLETS, OR OTHER WRITTEN MATERIAL FROM YOUR DOCTOR OR PHARMACY?: NEVER

## 2024-07-26 NOTE — PROGRESS NOTES
During enrollment call, personalized care program nurse at home. She would like to clarify med discrepancy with hydralazine.   No discharge documentation in [facility] from [facility].   Per last progress note hydralazine 50mg no freq and per patients home bottle hydralazine 100mg TID.   /67 and she took AM meds at 1130. Advised recheck around 1pm, this nurse to call back. Pt enrolled in [facility].   HD T-Th-S at 0530.  Provider notified of med discrepancy and bp.   1330 phoned patient LM.  1430 Phoned patient LM.   Provider advised:   She should at least take 50mg Q12 hydralazine.  1500 recheck bp 147/86, no new symptoms.   Advised patient what provider stated: hydralazine 50mg BID. Advised that we will re eval 7/28 and see how bp's are doing.  Patient verbalizes understanding.

## 2024-07-27 ENCOUNTER — PATIENT OUTREACH (OUTPATIENT)
Dept: CARE COORDINATION | Age: 54
End: 2024-07-27
Payer: COMMERCIAL

## 2024-07-27 VITALS — WEIGHT: 111 LBS | DIASTOLIC BLOOD PRESSURE: 88 MMHG | BODY MASS INDEX: 25.8 KG/M2 | SYSTOLIC BLOOD PRESSURE: 152 MMHG

## 2024-07-27 NOTE — PROGRESS NOTES
"Daily Call Note: Daily call completed with pt today. Pt states that she is \"OK but tired,\" having just come from hemodialysis. Pt denies pain and SOB.  /88   Wt 50.3 kg (111 lb)   BMI 25.80 kg/m²   [facility] scheduled for 7/28 at 1730 - pt verbalized understanding.  No other questions or concerns at this time.    Pt Education: per POC  Barriers: none  Topics for Daily Review: fatigue; VS  Pt demonstrates clear understanding: Yes    Daily Weight:  Vitals:    07/27/24 1100   Weight: 50.3 kg (111 lb)      Last 3 Weights:  Wt Readings from Last 7 Encounters:   07/27/24 50.3 kg (111 lb)   07/25/24 51.5 kg (113 lb 8.6 oz)   07/08/24 54.4 kg (119 lb 14.9 oz)   07/01/24 58.3 kg (128 lb 8.5 oz)   06/04/24 54.4 kg (120 lb)   05/29/24 52.7 kg (116 lb 2.9 oz)   05/15/24 57.5 kg (126 lb 12.8 oz)       Masimo Device: No   Masimo Clinical Impression: n/a    Virtual Visits--Scheduled (Most Recent Date at Top)  Follow up Appointments  Recent Visits  No visits were found meeting these conditions.  Showing recent visits within past 30 days and meeting all other requirements  Future Appointments  Date Type Provider Dept   08/02/24 Appointment Sherri Gastelum MD Do [id number]   Showing future appointments within next 90 days and meeting all other requirements       Frequency of RN Calls & Virtual Visits per Team Agreement: Healthy at Home Frequency: Daily    Medication issues Addressed (what was done): none    Follow up appointments scheduled by [facility] Staff: none  Referrals made by [facility] staff: none        "

## 2024-07-28 ENCOUNTER — TELEMEDICINE (OUTPATIENT)
Dept: CARE COORDINATION | Age: 54
End: 2024-07-28
Payer: COMMERCIAL

## 2024-07-28 ENCOUNTER — PATIENT OUTREACH (OUTPATIENT)
Dept: HOME HEALTH SERVICES | Age: 54
End: 2024-07-28

## 2024-07-28 VITALS — SYSTOLIC BLOOD PRESSURE: 154 MMHG | DIASTOLIC BLOOD PRESSURE: 82 MMHG

## 2024-07-28 DIAGNOSIS — I10 ESSENTIAL HYPERTENSION: Primary | ICD-10-CM

## 2024-07-28 DIAGNOSIS — N18.6 ESRD (END STAGE RENAL DISEASE) (MULTI): ICD-10-CM

## 2024-07-28 LAB
ATRIAL RATE: 75 BPM
P AXIS: 38 DEGREES
P OFFSET: 198 MS
P ONSET: 147 MS
PR INTERVAL: 146 MS
Q ONSET: 220 MS
QRS COUNT: 12 BEATS
QRS DURATION: 82 MS
QT INTERVAL: 380 MS
QTC CALCULATION(BAZETT): 424 MS
QTC FREDERICIA: 409 MS
R AXIS: 5 DEGREES
T AXIS: 163 DEGREES
T OFFSET: 410 MS
VENTRICULAR RATE: 75 BPM

## 2024-07-28 NOTE — PROGRESS NOTES
Daily Call Note: Initial [facility] complete with Dr. Starr. Patient states she is doing ok. She denies CP and SOB. She reports her BP today was 154/82 today. She states its usually 170's systolic. She states at dialysis it goes up, then bottoms out and then goes back up. All medications reviewed with Dr. Starr. Patient states compliance with all medications. BPs are improved since discharge from hospital. Dr. Starr instructed patient to check BP BID and log and make note if it was taken before dialysis or after to help better manage her BP and medications, patient  verbalized understanding. Scheduled next [facility] 8/5/24 at 1430. No other questions at this time.     Pt Education: per POC  Barriers: none  Topics for Daily Review: BP  Pt demonstrates clear understanding: Yes    Daily Weight:  There were no vitals filed for this visit.   Last 3 Weights:  Wt Readings from Last 7 Encounters:   07/27/24 50.3 kg (111 lb)   07/25/24 51.5 kg (113 lb 8.6 oz)   07/08/24 54.4 kg (119 lb 14.9 oz)   07/01/24 58.3 kg (128 lb 8.5 oz)   06/04/24 54.4 kg (120 lb)   05/29/24 52.7 kg (116 lb 2.9 oz)   05/15/24 57.5 kg (126 lb 12.8 oz)       Masimo Device: No   Masimo Clinical Impression: n/a    Virtual Visits--Scheduled (Most Recent Date at Top)  Follow up Appointments  Recent Visits  No visits were found meeting these conditions.  Showing recent visits within past 30 days and meeting all other requirements  Future Appointments  Date Type Provider Dept   08/02/24 Appointment Sherri Gastelum MD Do [id number]   Showing future appointments within next 90 days and meeting all other requirements       Frequency of RN Calls & Virtual Visits per Team Agreement: Healthy at Home Frequency: Daily    Medication issues Addressed (what was done): see note    Follow up appointments scheduled by [facility] Staff: none  Referrals made by [facility] staff: none

## 2024-07-29 ENCOUNTER — DOCUMENTATION (OUTPATIENT)
Dept: BEHAVIORAL HEALTH | Facility: CLINIC | Age: 54
End: 2024-07-29
Payer: COMMERCIAL

## 2024-07-29 ENCOUNTER — TELEPHONE (OUTPATIENT)
Dept: TRANSPLANT | Facility: HOSPITAL | Age: 54
End: 2024-07-29
Payer: COMMERCIAL

## 2024-07-29 NOTE — PROGRESS NOTES
Stacey Heath  Age: 54 y.o.  MRN: [id number]  Date: 7/29/2024  Location of service: phone call    Program Details  Medicaid Community Clinical Case Management  Status: Enrolled  Effective Dates: 10/17/2023 - present  Responsible Staff: NAREN Davidson      Goals Reviewed:    Problem: Risk of Uncoordinated Care       Goal: Care will be Coordinated and Supported by a Multidisciplinary Team of Providers       Priority: High          Summary:  This provider made contact with the patient via telephone call. This provider gained permission from the patient to move our weekly scheduled visit to a later time in the day. The patient agreed. This provider also inquired about the RD trying to make contact with the patient. The patient agreed to a virtual meet and greet with the RD the next time this provider or the Care Management RN comes to the patient's home.                  NAREN Davidson

## 2024-07-29 NOTE — PROGRESS NOTES
Stacey Heath  Age: 54 y.o.  MRN: [id number]  Date: 7/26/2024  Location of service: phone call and chart review    Program Details  Medicaid Community Clinical Case Management  Status: Enrolled  Effective Dates: 10/17/2023 - present  Responsible Staff: NAREN Davidson      Goals Reviewed:      Summary:  This writer called patient at 1000 to confirm our appointment for today at 1130. Patient confirms our appointment at this time.  This writer does a chart review to get a list of patient's current medications and to see what appointments she has upcoming.     Appointment start time: 1000  Appointment completion time: 1015  Total time spent with patient (in minutes): 15      Roberta Gallego RN

## 2024-07-29 NOTE — PROGRESS NOTES
Stacey Heath  Age: 54 y.o.  MRN: [id number]  Date: 7/26/2024  Location of service: in community    Program Details  Medicaid Community Clinical Case Management  Status: Enrolled  Effective Dates: 10/17/2023 - present  Responsible Staff: NAREN Davidson      Goals Reviewed:  Problem: Risk of Uncoordinated Care       Goal: Care will be Coordinated and Supported by a Multidisciplinary Team of Providers       Priority: High          UNBILLABLE [id number]  Summary  Patient discusses with this writer how the provider in the hospital went through all of her medications and got rid of the things she did not need.   This writer attempted to help patient get registered for [email or web address]. Unfortunately we were unable to get registered at this time. This writer will email Ludmila SNEED to see if she can help patient get registered.    [facility] calls while this writer is present. Patient gets enrolled in [facility]. This writer asks [facility] about her Hydralazine dosage. They state they will need to get her enrolled and discuss with pharmacy. She states she is going to discuss with pharmacy at this time and will call the patient back this afternoon.      BILLABLE [id number]  Physical Health:  Nursing Education:   This writer encourages patient to continue monitoring her BP at home.      Medications:  Review:  Patient's hydralazine bottle states 100mg TID, however her discharge paperwork states 50mg TID.    This writer did a medication reconciliation with the patient's medications she has at home. See med list below:        Current Outpatient Medications on File Prior to Visit   Medication Sig Dispense Refill    acetaminophen (Tylenol) 325 mg tablet Take 2 tablets (650 mg) by mouth every 4 hours if needed for mild pain (1 - 3) or moderate pain (4 - 6). 30 tablet 0    albuterol 1.25 mg/3 mL nebulizer solution Take 3 mL (1.25 mg) by nebulization every 6 hours if needed for wheezing. 90 mL 11     albuterol sulfate (Proair Digihaler) 90 mcg/actuation aero powdr breath act w/sensor inhaler Inhale 2 puffs 2 times a day as needed for wheezing or shortness of breath.      apixaban (Eliquis) 5 mg tablet Take 1 tablet (5 mg) by mouth 2 times a day. 120 tablet 0    aspirin 81 mg EC tablet Take 1 tablet (81 mg) by mouth once daily.      atorvastatin (Lipitor) 80 mg tablet Take 1 tablet (80 mg) by mouth once daily at bedtime. 30 tablet 0    B complex-vitamin C-folic acid (Nephrocaps) 1 mg capsule Take 1 capsule by mouth once daily. 30 capsule 0    benzonatate (Tessalon) 100 mg capsule Take 1 capsule (100 mg) by mouth 3 times a day as needed for cough. Do not crush or chew. 20 capsule 0    calcium acetate (Phoslo) 667 mg capsule Take 2 capsules (1,334 mg) by mouth 3 times daily (morning, midday, late afternoon). 180 capsule 0    carvedilol (Coreg) 25 mg tablet Take 2 tablets (50 mg) by mouth 2 times a day. 120 tablet 1    cloNIDine (Catapres) 0.2 mg tablet Take 1 tablet (0.2 mg) by mouth 3 times a day. 90 tablet 1    diphenhydrAMINE (BENADryl) 25 mg capsule Take 1 capsule (25 mg) by mouth as needed at bedtime for itching or sleep.      epoetin joceline (Epogen,Procrit) 10,000 unit/mL injection Inject 1 mL (10,000 Units) under the skin 3 times a week.      fluticasone (Flonase) 50 mcg/actuation nasal spray Administer 2 sprays into each nostril once daily. Shake gently. Before first use, prime pump. After use, clean tip and replace cap. 16 g 12    fluticasone furoate-vilanteroL (Breo Ellipta) 100-25 mcg/dose inhaler Inhale 1 puff once daily. 60 each 0    gabapentin (Neurontin) 300 mg capsule Take 1 capsule (300 mg) by mouth 3 times a day.      hydrALAZINE (Apresoline) 50 mg tablet Take 2 tablets (100 mg) by mouth 3 times a day.      isosorbide mononitrate ER (Imdur) 120 mg 24 hr tablet Take 1 tablet (120 mg) by mouth once daily. Do not crush or chew. 30 tablet 0    NIFEdipine ER (Adalat CC) 90 mg 24 hr tablet Take 1 tablet  (90 mg) by mouth once daily in the morning. Take before meals. Do not crush, chew, or split. 30 tablet 0    pantoprazole (ProtoNix) 40 mg EC tablet Take 1 tablet (40 mg) by mouth once daily in the morning. Take before meals. Do not crush, chew, or split.      polyethylene glycol (Glycolax, Miralax) 17 gram/dose powder Take 17 g (1 scoop dissolved in liquid) by mouth once daily. Do not start before July 26, 2024. 510 g 0    sodium chloride (Ocean) 0.65 % nasal spray Administer 1 spray into each nostril 4 times a day as needed for congestion. 44 mL 12    torsemide (Demadex) 20 mg tablet Take 2 tablets once daily on non-dialysis days only. Do not take on dialysis days. Do not start before July 3, 2024. 60 tablet 0    valsartan (Diovan) 320 mg tablet Take 1 tablet (320 mg) by mouth once daily. 30 tablet 1    [DISCONTINUED] carvedilol (Coreg) 12.5 mg tablet Take 3 tablets (37.5 mg) by mouth 2 times a day. 180 tablet 0    [DISCONTINUED] cloNIDine (Catapres) 0.1 mg tablet Take 1 tablet (0.1 mg) by mouth 3 times a day. 90 tablet 1    [DISCONTINUED] pantoprazole (ProtoNix) 40 mg EC tablet Take 1 tablet (40 mg) by mouth once daily in the morning. Take before meals. Do not crush, chew, or split.      [DISCONTINUED] valsartan (Diovan) 80 mg tablet Take 1 tablet (80 mg) by mouth once daily. 30 tablet 1     No current facility-administered medications on file prior to visit.        Appointment start time: 1124  Appointment completion time: 1230  Total time spent with patient (in minutes): 66      Roberta Gallego, RN

## 2024-07-30 ENCOUNTER — PATIENT OUTREACH (OUTPATIENT)
Dept: HOME HEALTH SERVICES | Age: 54
End: 2024-07-30
Payer: COMMERCIAL

## 2024-07-30 VITALS — SYSTOLIC BLOOD PRESSURE: 126 MMHG | DIASTOLIC BLOOD PRESSURE: 59 MMHG

## 2024-07-30 NOTE — PROGRESS NOTES
Daily Call Note:     Daily call completed with the patient.  She states she is tired, as she just came home from dialysis.  She states that her BP was 126/59.  Pt had no questions, concerns, or in need of assistance during the call.     Pt Education:   Barriers:   Topics for Daily Review:   Pt demonstrates clear understanding:     Daily Weight:  There were no vitals filed for this visit.   Last 3 Weights:  Wt Readings from Last 7 Encounters:   07/27/24 50.3 kg (111 lb)   07/25/24 51.5 kg (113 lb 8.6 oz)   07/08/24 54.4 kg (119 lb 14.9 oz)   07/01/24 58.3 kg (128 lb 8.5 oz)   06/04/24 54.4 kg (120 lb)   05/29/24 52.7 kg (116 lb 2.9 oz)   05/15/24 57.5 kg (126 lb 12.8 oz)       Masimo Device:    Masimo Clinical Impression:     Virtual Visits--Scheduled (Most Recent Date at Top)  Follow up Appointments  Recent Visits  No visits were found meeting these conditions.  Showing recent visits within past 30 days and meeting all other requirements  Future Appointments  Date Type Provider Dept   08/02/24 Appointment Sherri Gastelum MD Do [id number]   Showing future appointments within next 90 days and meeting all other requirements       Frequency of RN Calls & Virtual Visits per Team Agreement:    Medication issues Addressed (what was done):     Follow up appointments scheduled by [facility] Staff:   Referrals made by [facility] staff:

## 2024-07-31 ENCOUNTER — DOCUMENTATION (OUTPATIENT)
Dept: BEHAVIORAL HEALTH | Facility: CLINIC | Age: 54
End: 2024-07-31
Payer: COMMERCIAL

## 2024-07-31 ENCOUNTER — PATIENT OUTREACH (OUTPATIENT)
Dept: CARE COORDINATION | Facility: CLINIC | Age: 54
End: 2024-07-31
Payer: COMMERCIAL

## 2024-07-31 DIAGNOSIS — F41.9 ANXIETY: ICD-10-CM

## 2024-07-31 PROCEDURE — H2019 THER BEHAV SVC, PER 15 MIN: HCPCS | Mod: HE | Performed by: COMMUNITY/BEHAVIORAL HEALTH

## 2024-07-31 NOTE — PROGRESS NOTES
Spoke to pt today. Pt again agreed to work with this RD. Scheduled initial appt for 8/9 at 2:30p. Appt will be virtual via [facility].  PC OZZYW will help pt access appt link in her [facility] account since pt has not had an appt through here before.

## 2024-08-01 ENCOUNTER — PATIENT OUTREACH (OUTPATIENT)
Dept: CARE COORDINATION | Age: 54
End: 2024-08-01
Payer: COMMERCIAL

## 2024-08-01 NOTE — PROGRESS NOTES
"Daily Call Note: Daily call completed with pt this morning. Pt had just returned from dialysis and reports that she is feeling \"tired.\"  BP this morning was 166/62. No other VS reported.   Next [facility] scheduled for 8/5 at 1430 - pt verbalized understanding.  No other questions or concerns at this time.    Pt Education: per POC  Barriers: none  Topics for Daily Review: BP  Pt demonstrates clear understanding: Yes    Daily Weight:  There were no vitals filed for this visit.   Last 3 Weights:  Wt Readings from Last 7 Encounters:   07/27/24 50.3 kg (111 lb)   07/25/24 51.5 kg (113 lb 8.6 oz)   07/08/24 54.4 kg (119 lb 14.9 oz)   07/01/24 58.3 kg (128 lb 8.5 oz)   06/04/24 54.4 kg (120 lb)   05/29/24 52.7 kg (116 lb 2.9 oz)   05/15/24 57.5 kg (126 lb 12.8 oz)       Masimo Device: No   Masimo Clinical Impression: n?a    Virtual Visits--Scheduled (Most Recent Date at Top)  Follow up Appointments  Recent Visits  No visits were found meeting these conditions.  Showing recent visits within past 30 days and meeting all other requirements  Future Appointments  Date Type Provider Dept   08/02/24 Appointment [facility]ast[facility]um [id number]   Showing future appointments within next 90 days and meeting all other requirements       Frequency of RN Calls & Virtual Visits per Team Agreement: [facility] Frequency: Daily    Medication issues Addressed (what was done): none    Follow up appointments scheduled by [facility] Staff: none  Referrals made by [facility] staff: none        "

## 2024-08-01 NOTE — PROGRESS NOTES
Stacey Heath  Age: 54 y.o.  MRN: [id number]  Date: 7/31/2024  Location of service: in community    Program Details  Medicaid Community Clinical Case Management  Status: Enrolled  Effective Dates: 10/17/2023 - present  Responsible Staff: NAREN Davidson      Goals Reviewed:  Problem: Anxiety       Goal: Attempts to manage anxiety with help       Priority: High         Goal: Verbalizes ways to manage anxiety       Priority: High         Goal: Implements measures to reduce anxiety       Priority: High          Problem: Risk of Uncoordinated Care       Goal: Care will be Coordinated and Supported by a Multidisciplinary Team of Providers       Priority: High          Summary:  This provider met with patient at the patient's home. This provider listened with empathy as patient explained some recent family situations due to the passing of a family member. This provider was able to validate the patient's feelings and inquired if this stuff was having an impact on patient's medical condition. Patient denied any impact. This provider assisted patient with scheduling an appointment with Bess Hurst the JAIRO on the Personalized Care team. Patient informed provider that she has been in contact from a person with the Transplant team. The patient disclosed that she is feeling encouraged by this phone call.          NAREN Davidson

## 2024-08-02 ENCOUNTER — APPOINTMENT (OUTPATIENT)
Dept: PRIMARY CARE | Facility: CLINIC | Age: 54
End: 2024-08-02
Payer: COMMERCIAL

## 2024-08-02 ENCOUNTER — PATIENT OUTREACH (OUTPATIENT)
Dept: CARE COORDINATION | Age: 54
End: 2024-08-02

## 2024-08-02 NOTE — PROGRESS NOTES
"Daily Call Note: Daily call completed with pt this morning. She reports that she is feeling \"fine\" today. Being that this is not a dialysis day, she is not as fatigued as she has been on previous calls with this RN. She denies pain and edema. And avoids SOB by staying out of the heat.  Next [facility] scheduled for 8/5 at 1430 - verbalized understanding.  No other questions or concerns at this time.    Pt Education: POC  Barriers: none  Topics for Daily Review: dialysis; fatigue  Pt demonstrates clear understanding: Yes    Daily Weight:  There were no vitals filed for this visit.   Last 3 Weights:  Wt Readings from Last 7 Encounters:   07/27/24 50.3 kg (111 lb)   07/25/24 51.5 kg (113 lb 8.6 oz)   07/08/24 54.4 kg (119 lb 14.9 oz)   07/01/24 58.3 kg (128 lb 8.5 oz)   06/04/24 54.4 kg (120 lb)   05/29/24 52.7 kg (116 lb 2.9 oz)   05/15/24 57.5 kg (126 lb 12.8 oz)       Masimo Device: No   Masimo Clinical Impression: n/a    Virtual Visits--Scheduled (Most Recent Date at Top)  Follow up Appointments  Recent Visits  No visits were found meeting these conditions.  Showing recent visits within past 30 days and meeting all other requirements  Today's Visits  Date Type Provider Dept   08/02/24 Appointment Sherri Gastelum MD Do [id number]   Showing today's visits and meeting all other requirements  Future Appointments  No visits were found meeting these conditions.  Showing future appointments within next 90 days and meeting all other requirements       Frequency of RN Calls & Virtual Visits per Team Agreement: [facility] Frequency: Daily    Medication issues Addressed (what was done): none    Follow up appointments scheduled by [facility] Staff: none  Referrals made by [facility] staff: none        "

## 2024-08-03 ENCOUNTER — PATIENT OUTREACH (OUTPATIENT)
Dept: HOME HEALTH SERVICES | Age: 54
End: 2024-08-03
Payer: COMMERCIAL

## 2024-08-03 NOTE — PROGRESS NOTES
Daily Call Note: Daily call completed - the patient was tired, was not at home, and indicated the call was not a good time. She had dialysis today as well as having been to a   - she has not had a chance to obtain rest. She stated there were no issues to address. She was told we would follow up on Monday and was reminded of the [facility] time.    Daily Weight:  There were no vitals filed for this visit.   Last 3 Weights:  Wt Readings from Last 7 Encounters:   24 50.3 kg (111 lb)   24 51.5 kg (113 lb 8.6 oz)   24 54.4 kg (119 lb 14.9 oz)   24 58.3 kg (128 lb 8.5 oz)   24 54.4 kg (120 lb)   24 52.7 kg (116 lb 2.9 oz)   05/15/24 57.5 kg (126 lb 12.8 oz)       M  Virtual Visits--Scheduled (Most Recent Date at Top)  Follow up Appointments  Recent Visits  No visits were found meeting these conditions.  Showing recent visits within past 30 days and meeting all other requirements  Future Appointments  No visits were found meeting these conditions.  Showing future appointments within next 90 days and meeting all other requirements       Frequency of RN Calls & Virtual Visits per Team Agreement: [facility] Frequency: Daily

## 2024-08-05 ENCOUNTER — PATIENT OUTREACH (OUTPATIENT)
Dept: HOME HEALTH SERVICES | Age: 54
End: 2024-08-05

## 2024-08-05 ENCOUNTER — APPOINTMENT (OUTPATIENT)
Dept: CARE COORDINATION | Age: 54
End: 2024-08-05
Payer: COMMERCIAL

## 2024-08-05 VITALS — DIASTOLIC BLOOD PRESSURE: 64 MMHG | SYSTOLIC BLOOD PRESSURE: 154 MMHG

## 2024-08-05 DIAGNOSIS — I50.31 ACUTE DIASTOLIC CHF (CONGESTIVE HEART FAILURE) (MULTI): Primary | ICD-10-CM

## 2024-08-05 DIAGNOSIS — I10 ESSENTIAL HYPERTENSION: ICD-10-CM

## 2024-08-05 PROCEDURE — RXMED WILLOW AMBULATORY MEDICATION CHARGE

## 2024-08-05 NOTE — PROGRESS NOTES
Weekly call  completed with Dr Wilman Jerry from pharmacy patient when she woke up this am her BP before meds 207/97 after her bp 154/64 confirmed she is taking Hydralazine 100 mg BID and her clonadine TID she has dialysis T//Sat no issues she does have a  on  her session will be shorter next [facility]  @ 1637

## 2024-08-06 ENCOUNTER — DOCUMENTATION (OUTPATIENT)
Dept: BEHAVIORAL HEALTH | Facility: CLINIC | Age: 54
End: 2024-08-06
Payer: COMMERCIAL

## 2024-08-06 ENCOUNTER — PATIENT OUTREACH (OUTPATIENT)
Dept: CARE COORDINATION | Age: 54
End: 2024-08-06
Payer: COMMERCIAL

## 2024-08-06 NOTE — PROGRESS NOTES
Daily Call Note: Daily call completed with pt this morning. Pt states that she feels good today. No CP, pain, SOB, edema. Pt does c/o feeling lightheaded the last couple days after her am meds - she is attributing this to her increased hydralazine. Today's BP is 184/79 - she does not feel lightheaded at this time, so will continue meds as ordered and will continue to check her BP.   Pt did confirm that she is taking two tablets of the hydralazine as they are 50mg. Strongly encouraged pt to ensure that she is taking the correct dose. She verbalized understanding.  Next [facility] scheduled for 8/11 at 1630.  No other questions or concerns at this time.    Pt Education: POC  Barriers: none  Topics for Daily Review: BP; meds;  Pt demonstrates clear understanding: Yes    Daily Weight:  There were no vitals filed for this visit.   Last 3 Weights:  Wt Readings from Last 7 Encounters:   07/27/24 50.3 kg (111 lb)   07/25/24 51.5 kg (113 lb 8.6 oz)   07/08/24 54.4 kg (119 lb 14.9 oz)   07/01/24 58.3 kg (128 lb 8.5 oz)   06/04/24 54.4 kg (120 lb)   05/29/24 52.7 kg (116 lb 2.9 oz)   05/15/24 57.5 kg (126 lb 12.8 oz)       Masimo Device: No   Masimo Clinical Impression: n/a    Virtual Visits--Scheduled (Most Recent Date at Top)  Follow up Appointments  Recent Visits  Date Type Provider Dept   08/05/24 Telemedicine Mago Stovall MD [facility]   Showing recent visits within past 30 days and meeting all other requirements  Future Appointments  No visits were found meeting these conditions.  Showing future appointments within next 90 days and meeting all other requirements       Frequency of RN Calls & Virtual Visits per Team Agreement: [facility] Frequency: Daily    Medication issues Addressed (what was done): BP meds    Follow up appointments scheduled by [facility] Staff: none  Referrals made by [facility] staff: none

## 2024-08-07 ENCOUNTER — PATIENT OUTREACH (OUTPATIENT)
Dept: HOME HEALTH SERVICES | Age: 54
End: 2024-08-07
Payer: COMMERCIAL

## 2024-08-07 ENCOUNTER — DOCUMENTATION (OUTPATIENT)
Dept: BEHAVIORAL HEALTH | Facility: CLINIC | Age: 54
End: 2024-08-07
Payer: COMMERCIAL

## 2024-08-07 VITALS — SYSTOLIC BLOOD PRESSURE: 174 MMHG | DIASTOLIC BLOOD PRESSURE: 62 MMHG

## 2024-08-07 DIAGNOSIS — F41.9 ANXIETY: ICD-10-CM

## 2024-08-07 NOTE — PROGRESS NOTES
Stacey Heath  Age: 54 y.o.  MRN: [id number]  Date: 8/6/2024  Location of service: in community    Program Details  Medicaid Community Clinical Case Management  Status: Enrolled  Effective Dates: 10/17/2023 - present  Responsible Staff: NAREN Davidson      Goals Reviewed:  Problem: Risk of Uncoordinated Care       Goal: Care will be Coordinated and Supported by a Multidisciplinary Team of Providers       Priority: High          Summary:  This writer meets with patient at her dialysis center.   Patient states she has been getting lightheaded after her morning dose of medications. Patient states she doesn't currently have a BP cuff to check her BP.   This writer instructs patient to speak with [facility] about her current medication regimen.   Patient discusses her family and some situations that happened over the last few weeks.  This writer speaks with the  to find out where they are at in the process of getting the patient transferred to [facility] for dialysis. They state they have made the referral but they are waiting for [facility] to approve the transfer. They provide this writer with the name and phone number of the person in [facility] admission.   This writer informs patient of where we are at in the process.  This writer calls Mabel with [facility]'s admissions and leaves a voicemail.     Appointment start time: 0830  Appointment completion time: 0920  Total time spent with patient (in minutes): 50      Roberta Gallego RN

## 2024-08-07 NOTE — PROGRESS NOTES
Stacey Heath  Age: 54 y.o.  MRN: [id number]  Date: 8/6/2024  Location of service: phone call for care coordination    Program Details  Medicaid Community Clinical Case Management  Status: Enrolled  Effective Dates: 10/17/2023 - present  Responsible Staff: NAREN Davidson      Goals Reviewed:  Problem: Risk of Uncoordinated Care       Goal: Care will be Coordinated and Supported by a Multidisciplinary Team of Providers       Priority: High        Summary:  This writer spoke with Mabel from [facility]. She states that [facility]'t [facility] all the information that she needs to get the patient transferred to [facility]. She states she sent a request for the additional information but had not heard back yet. This writer gives Mabel the patient's phone number so she can get additional information from the patient.    Appointment start time: 1645  Appointment completion time: 1655  Total time spent with patient (in minutes): 10      Roberta Gallego RN

## 2024-08-07 NOTE — PROGRESS NOTES
Daily call completed patient states she is doing ok BP this am 174/62 before medication she decided because of the dizzy ness she has been experiencing she is going to only take 50 mg hydralazine BID asked that she re check her BP in a couple hours to make sure that he BP is improving with the lower dose if not we may needs to come up with a better plan no other medication needs. Questions and concerns addressed

## 2024-08-08 ENCOUNTER — APPOINTMENT (OUTPATIENT)
Dept: DIALYSIS | Facility: HOSPITAL | Age: 54
End: 2024-08-08
Payer: COMMERCIAL

## 2024-08-08 ENCOUNTER — APPOINTMENT (OUTPATIENT)
Dept: RADIOLOGY | Facility: HOSPITAL | Age: 54
End: 2024-08-08
Payer: COMMERCIAL

## 2024-08-08 ENCOUNTER — HOSPITAL ENCOUNTER (INPATIENT)
Facility: HOSPITAL | Age: 54
LOS: 1 days | Discharge: HOME | End: 2024-08-09
Attending: STUDENT IN AN ORGANIZED HEALTH CARE EDUCATION/TRAINING PROGRAM | Admitting: INTERNAL MEDICINE
Payer: COMMERCIAL

## 2024-08-08 ENCOUNTER — APPOINTMENT (OUTPATIENT)
Dept: CARDIOLOGY | Facility: HOSPITAL | Age: 54
End: 2024-08-08
Payer: COMMERCIAL

## 2024-08-08 DIAGNOSIS — Z99.2 ESRD ON DIALYSIS (MULTI): ICD-10-CM

## 2024-08-08 DIAGNOSIS — G62.9 NEUROPATHY: ICD-10-CM

## 2024-08-08 DIAGNOSIS — I82.622 ACUTE DEEP VEIN THROMBOSIS (DVT) OF LEFT UPPER EXTREMITY (MULTI): ICD-10-CM

## 2024-08-08 DIAGNOSIS — N18.6 ESRD ON DIALYSIS (MULTI): ICD-10-CM

## 2024-08-08 DIAGNOSIS — I16.0 HYPERTENSIVE URGENCY: ICD-10-CM

## 2024-08-08 DIAGNOSIS — I10 ESSENTIAL HYPERTENSION: ICD-10-CM

## 2024-08-08 DIAGNOSIS — E87.70 HYPERVOLEMIA, UNSPECIFIED HYPERVOLEMIA TYPE: Primary | ICD-10-CM

## 2024-08-08 LAB
ALBUMIN SERPL BCP-MCNC: 4 G/DL (ref 3.4–5)
ALP SERPL-CCNC: 136 U/L (ref 33–110)
ALT SERPL W P-5'-P-CCNC: 20 U/L (ref 7–45)
ANION GAP BLDV CALCULATED.4IONS-SCNC: 12 MMOL/L (ref 10–25)
ANION GAP BLDV CALCULATED.4IONS-SCNC: 12 MMOL/L (ref 10–25)
ANION GAP SERPL CALC-SCNC: 19 MMOL/L (ref 10–20)
AST SERPL W P-5'-P-CCNC: 22 U/L (ref 9–39)
BASE EXCESS BLDV CALC-SCNC: 1 MMOL/L (ref -2–3)
BASE EXCESS BLDV CALC-SCNC: 2.4 MMOL/L (ref -2–3)
BASOPHILS # BLD AUTO: 0.07 X10*3/UL (ref 0–0.1)
BASOPHILS NFR BLD AUTO: 1.3 %
BILIRUB SERPL-MCNC: 0.4 MG/DL (ref 0–1.2)
BNP SERPL-MCNC: 2200 PG/ML (ref 0–99)
BODY TEMPERATURE: 37 DEGREES CELSIUS
BODY TEMPERATURE: 37 DEGREES CELSIUS
BUN SERPL-MCNC: 63 MG/DL (ref 6–23)
CA-I BLDV-SCNC: 1.13 MMOL/L (ref 1.1–1.33)
CA-I BLDV-SCNC: 1.15 MMOL/L (ref 1.1–1.33)
CALCIUM SERPL-MCNC: 9.2 MG/DL (ref 8.6–10.6)
CARDIAC TROPONIN I PNL SERPL HS: 31 NG/L (ref 0–34)
CARDIAC TROPONIN I PNL SERPL HS: 32 NG/L (ref 0–34)
CHLORIDE BLDV-SCNC: 105 MMOL/L (ref 98–107)
CHLORIDE BLDV-SCNC: 106 MMOL/L (ref 98–107)
CHLORIDE SERPL-SCNC: 101 MMOL/L (ref 98–107)
CO2 SERPL-SCNC: 26 MMOL/L (ref 21–32)
CREAT SERPL-MCNC: 11.14 MG/DL (ref 0.5–1.05)
EGFRCR SERPLBLD CKD-EPI 2021: 4 ML/MIN/1.73M*2
EOSINOPHIL # BLD AUTO: 0.49 X10*3/UL (ref 0–0.7)
EOSINOPHIL NFR BLD AUTO: 8.8 %
ERYTHROCYTE [DISTWIDTH] IN BLOOD BY AUTOMATED COUNT: 18.6 % (ref 11.5–14.5)
GLUCOSE BLD MANUAL STRIP-MCNC: 142 MG/DL (ref 74–99)
GLUCOSE BLD MANUAL STRIP-MCNC: 145 MG/DL (ref 74–99)
GLUCOSE BLD MANUAL STRIP-MCNC: 154 MG/DL (ref 74–99)
GLUCOSE BLD MANUAL STRIP-MCNC: 159 MG/DL (ref 74–99)
GLUCOSE BLD MANUAL STRIP-MCNC: 96 MG/DL (ref 74–99)
GLUCOSE BLDV-MCNC: 158 MG/DL (ref 74–99)
GLUCOSE BLDV-MCNC: 99 MG/DL (ref 74–99)
GLUCOSE SERPL-MCNC: 154 MG/DL (ref 74–99)
HCO3 BLDV-SCNC: 26.6 MMOL/L (ref 22–26)
HCO3 BLDV-SCNC: 27.8 MMOL/L (ref 22–26)
HCT VFR BLD AUTO: 27.8 % (ref 36–46)
HCT VFR BLD EST: 26 % (ref 36–46)
HCT VFR BLD EST: 28 % (ref 36–46)
HGB BLD-MCNC: 8.7 G/DL (ref 12–16)
HGB BLDV-MCNC: 8.8 G/DL (ref 12–16)
HGB BLDV-MCNC: 9.2 G/DL (ref 12–16)
IMM GRANULOCYTES # BLD AUTO: 0.02 X10*3/UL (ref 0–0.7)
IMM GRANULOCYTES NFR BLD AUTO: 0.4 % (ref 0–0.9)
INHALED O2 CONCENTRATION: 21 %
INHALED O2 CONCENTRATION: 30 %
LACTATE BLDV-SCNC: 0.6 MMOL/L (ref 0.4–2)
LACTATE BLDV-SCNC: 0.7 MMOL/L (ref 0.4–2)
LYMPHOCYTES # BLD AUTO: 0.98 X10*3/UL (ref 1.2–4.8)
LYMPHOCYTES NFR BLD AUTO: 17.7 %
MAGNESIUM SERPL-MCNC: 2.31 MG/DL (ref 1.6–2.4)
MCH RBC QN AUTO: 28.3 PG (ref 26–34)
MCHC RBC AUTO-ENTMCNC: 31.3 G/DL (ref 32–36)
MCV RBC AUTO: 91 FL (ref 80–100)
MONOCYTES # BLD AUTO: 0.38 X10*3/UL (ref 0.1–1)
MONOCYTES NFR BLD AUTO: 6.8 %
NEUTROPHILS # BLD AUTO: 3.61 X10*3/UL (ref 1.2–7.7)
NEUTROPHILS NFR BLD AUTO: 65 %
NRBC BLD-RTO: 0 /100 WBCS (ref 0–0)
OXYHGB MFR BLDV: 67.1 % (ref 45–75)
OXYHGB MFR BLDV: 74 % (ref 45–75)
PCO2 BLDV: 46 MM HG (ref 41–51)
PCO2 BLDV: 46 MM HG (ref 41–51)
PH BLDV: 7.37 PH (ref 7.33–7.43)
PH BLDV: 7.39 PH (ref 7.33–7.43)
PLATELET # BLD AUTO: 261 X10*3/UL (ref 150–450)
PO2 BLDV: 47 MM HG (ref 35–45)
PO2 BLDV: 51 MM HG (ref 35–45)
POTASSIUM BLDV-SCNC: 5.8 MMOL/L (ref 3.5–5.3)
POTASSIUM BLDV-SCNC: 6 MMOL/L (ref 3.5–5.3)
POTASSIUM SERPL-SCNC: 5.4 MMOL/L (ref 3.5–5.3)
PROT SERPL-MCNC: 7.5 G/DL (ref 6.4–8.2)
RBC # BLD AUTO: 3.07 X10*6/UL (ref 4–5.2)
SAO2 % BLDV: 68 % (ref 45–75)
SAO2 % BLDV: 75 % (ref 45–75)
SODIUM BLDV-SCNC: 139 MMOL/L (ref 136–145)
SODIUM BLDV-SCNC: 139 MMOL/L (ref 136–145)
SODIUM SERPL-SCNC: 141 MMOL/L (ref 136–145)
WBC # BLD AUTO: 5.6 X10*3/UL (ref 4.4–11.3)

## 2024-08-08 PROCEDURE — 36415 COLL VENOUS BLD VENIPUNCTURE: CPT

## 2024-08-08 PROCEDURE — 99285 EMERGENCY DEPT VISIT HI MDM: CPT | Performed by: STUDENT IN AN ORGANIZED HEALTH CARE EDUCATION/TRAINING PROGRAM

## 2024-08-08 PROCEDURE — 94660 CPAP INITIATION&MGMT: CPT

## 2024-08-08 PROCEDURE — 2500000001 HC RX 250 WO HCPCS SELF ADMINISTERED DRUGS (ALT 637 FOR MEDICARE OP)

## 2024-08-08 PROCEDURE — 71045 X-RAY EXAM CHEST 1 VIEW: CPT | Performed by: STUDENT IN AN ORGANIZED HEALTH CARE EDUCATION/TRAINING PROGRAM

## 2024-08-08 PROCEDURE — 2500000002 HC RX 250 W HCPCS SELF ADMINISTERED DRUGS (ALT 637 FOR MEDICARE OP, ALT 636 FOR OP/ED)

## 2024-08-08 PROCEDURE — 99285 EMERGENCY DEPT VISIT HI MDM: CPT | Mod: 25

## 2024-08-08 PROCEDURE — 84132 ASSAY OF SERUM POTASSIUM: CPT

## 2024-08-08 PROCEDURE — 2500000004 HC RX 250 GENERAL PHARMACY W/ HCPCS (ALT 636 FOR OP/ED)

## 2024-08-08 PROCEDURE — 99291 CRITICAL CARE FIRST HOUR: CPT | Performed by: INTERNAL MEDICINE

## 2024-08-08 PROCEDURE — 2500000004 HC RX 250 GENERAL PHARMACY W/ HCPCS (ALT 636 FOR OP/ED): Mod: JG

## 2024-08-08 PROCEDURE — 84484 ASSAY OF TROPONIN QUANT: CPT

## 2024-08-08 PROCEDURE — 82947 ASSAY GLUCOSE BLOOD QUANT: CPT

## 2024-08-08 PROCEDURE — 93010 ELECTROCARDIOGRAM REPORT: CPT | Performed by: INTERNAL MEDICINE

## 2024-08-08 PROCEDURE — 1100000001 HC PRIVATE ROOM DAILY

## 2024-08-08 PROCEDURE — 71045 X-RAY EXAM CHEST 1 VIEW: CPT

## 2024-08-08 PROCEDURE — 8010000001 HC DIALYSIS - HEMODIALYSIS PER DAY

## 2024-08-08 PROCEDURE — 93010 ELECTROCARDIOGRAM REPORT: CPT | Performed by: STUDENT IN AN ORGANIZED HEALTH CARE EDUCATION/TRAINING PROGRAM

## 2024-08-08 PROCEDURE — 80053 COMPREHEN METABOLIC PANEL: CPT

## 2024-08-08 PROCEDURE — 94640 AIRWAY INHALATION TREATMENT: CPT

## 2024-08-08 PROCEDURE — 99222 1ST HOSP IP/OBS MODERATE 55: CPT | Performed by: INTERNAL MEDICINE

## 2024-08-08 PROCEDURE — 85025 COMPLETE CBC W/AUTO DIFF WBC: CPT

## 2024-08-08 PROCEDURE — 93005 ELECTROCARDIOGRAM TRACING: CPT

## 2024-08-08 PROCEDURE — 96374 THER/PROPH/DIAG INJ IV PUSH: CPT

## 2024-08-08 PROCEDURE — 83880 ASSAY OF NATRIURETIC PEPTIDE: CPT

## 2024-08-08 PROCEDURE — 83735 ASSAY OF MAGNESIUM: CPT

## 2024-08-08 RX ORDER — NIFEDIPINE 90 MG/1
90 TABLET, EXTENDED RELEASE ORAL
Status: DISCONTINUED | OUTPATIENT
Start: 2024-08-08 | End: 2024-08-08

## 2024-08-08 RX ORDER — FLUTICASONE FUROATE AND VILANTEROL 100; 25 UG/1; UG/1
1 POWDER RESPIRATORY (INHALATION)
Status: DISCONTINUED | OUTPATIENT
Start: 2024-08-08 | End: 2024-08-09 | Stop reason: HOSPADM

## 2024-08-08 RX ORDER — FUROSEMIDE 10 MG/ML
80 INJECTION INTRAMUSCULAR; INTRAVENOUS ONCE
Status: COMPLETED | OUTPATIENT
Start: 2024-08-08 | End: 2024-08-08

## 2024-08-08 RX ORDER — HYDRALAZINE HYDROCHLORIDE 25 MG/1
100 TABLET, FILM COATED ORAL ONCE
Status: COMPLETED | OUTPATIENT
Start: 2024-08-08 | End: 2024-08-08

## 2024-08-08 RX ORDER — CLONIDINE HYDROCHLORIDE 0.1 MG/1
0.2 TABLET ORAL EVERY 8 HOURS SCHEDULED
Status: DISCONTINUED | OUTPATIENT
Start: 2024-08-08 | End: 2024-08-09 | Stop reason: HOSPADM

## 2024-08-08 RX ORDER — CARVEDILOL 25 MG/1
50 TABLET ORAL 2 TIMES DAILY
Status: DISCONTINUED | OUTPATIENT
Start: 2024-08-08 | End: 2024-08-09 | Stop reason: HOSPADM

## 2024-08-08 RX ORDER — ISOSORBIDE MONONITRATE 30 MG/1
120 TABLET, EXTENDED RELEASE ORAL DAILY
Status: DISCONTINUED | OUTPATIENT
Start: 2024-08-08 | End: 2024-08-08

## 2024-08-08 RX ORDER — NITROGLYCERIN 0.4 MG/1
0.4 TABLET SUBLINGUAL ONCE
Status: COMPLETED | OUTPATIENT
Start: 2024-08-08 | End: 2024-08-08

## 2024-08-08 RX ORDER — VALSARTAN 320 MG/1
320 TABLET ORAL DAILY
Status: DISCONTINUED | OUTPATIENT
Start: 2024-08-08 | End: 2024-08-09 | Stop reason: HOSPADM

## 2024-08-08 RX ORDER — CARVEDILOL 12.5 MG/1
50 TABLET ORAL ONCE
Status: COMPLETED | OUTPATIENT
Start: 2024-08-08 | End: 2024-08-08

## 2024-08-08 RX ORDER — HYDRALAZINE HYDROCHLORIDE 100 MG/1
100 TABLET, FILM COATED ORAL 2 TIMES DAILY
Status: DISCONTINUED | OUTPATIENT
Start: 2024-08-08 | End: 2024-08-09

## 2024-08-08 RX ORDER — CALCIUM ACETATE 667 MG/1
1334 CAPSULE ORAL
Status: DISCONTINUED | OUTPATIENT
Start: 2024-08-08 | End: 2024-08-09 | Stop reason: HOSPADM

## 2024-08-08 RX ORDER — ACETAMINOPHEN 325 MG/1
650 TABLET ORAL ONCE
Status: COMPLETED | OUTPATIENT
Start: 2024-08-08 | End: 2024-08-08

## 2024-08-08 RX ORDER — ALBUTEROL SULFATE 0.83 MG/ML
1.25 SOLUTION RESPIRATORY (INHALATION) EVERY 6 HOURS PRN
Status: DISCONTINUED | OUTPATIENT
Start: 2024-08-08 | End: 2024-08-09 | Stop reason: HOSPADM

## 2024-08-08 RX ORDER — ATORVASTATIN CALCIUM 80 MG/1
80 TABLET, FILM COATED ORAL NIGHTLY
Status: DISCONTINUED | OUTPATIENT
Start: 2024-08-08 | End: 2024-08-09 | Stop reason: HOSPADM

## 2024-08-08 RX ORDER — ASPIRIN 81 MG/1
81 TABLET ORAL DAILY
Status: DISCONTINUED | OUTPATIENT
Start: 2024-08-08 | End: 2024-08-09 | Stop reason: HOSPADM

## 2024-08-08 RX ORDER — TORSEMIDE 20 MG/1
20 TABLET ORAL DAILY
Status: DISCONTINUED | OUTPATIENT
Start: 2024-08-08 | End: 2024-08-09 | Stop reason: HOSPADM

## 2024-08-08 RX ORDER — FLUTICASONE PROPIONATE 50 MCG
2 SPRAY, SUSPENSION (ML) NASAL DAILY
Status: DISCONTINUED | OUTPATIENT
Start: 2024-08-08 | End: 2024-08-09 | Stop reason: HOSPADM

## 2024-08-08 RX ORDER — ISOSORBIDE MONONITRATE 30 MG/1
60 TABLET, EXTENDED RELEASE ORAL DAILY
Status: DISCONTINUED | OUTPATIENT
Start: 2024-08-08 | End: 2024-08-09

## 2024-08-08 RX ORDER — NITROGLYCERIN 20 MG/100ML
5-200 INJECTION INTRAVENOUS CONTINUOUS
Status: DISCONTINUED | OUTPATIENT
Start: 2024-08-08 | End: 2024-08-08

## 2024-08-08 RX ORDER — CLONIDINE HYDROCHLORIDE 0.1 MG/1
0.2 TABLET ORAL 3 TIMES DAILY
Status: DISCONTINUED | OUTPATIENT
Start: 2024-08-08 | End: 2024-08-08

## 2024-08-08 RX ORDER — CLONIDINE HYDROCHLORIDE 0.1 MG/1
0.1 TABLET ORAL ONCE
Status: COMPLETED | OUTPATIENT
Start: 2024-08-08 | End: 2024-08-08

## 2024-08-08 RX ORDER — NIFEDIPINE 90 MG/1
90 TABLET, EXTENDED RELEASE ORAL
Status: DISCONTINUED | OUTPATIENT
Start: 2024-08-08 | End: 2024-08-09 | Stop reason: HOSPADM

## 2024-08-08 RX ORDER — HEPARIN SODIUM 5000 [USP'U]/ML
5000 INJECTION, SOLUTION INTRAVENOUS; SUBCUTANEOUS EVERY 8 HOURS
Status: DISCONTINUED | OUTPATIENT
Start: 2024-08-08 | End: 2024-08-08

## 2024-08-08 RX ORDER — GABAPENTIN 300 MG/1
300 CAPSULE ORAL DAILY
Status: DISCONTINUED | OUTPATIENT
Start: 2024-08-08 | End: 2024-08-09 | Stop reason: HOSPADM

## 2024-08-08 RX ORDER — POLYETHYLENE GLYCOL 3350 17 G/17G
17 POWDER, FOR SOLUTION ORAL DAILY
Status: DISCONTINUED | OUTPATIENT
Start: 2024-08-08 | End: 2024-08-09 | Stop reason: HOSPADM

## 2024-08-08 SDOH — SOCIAL STABILITY: SOCIAL INSECURITY: ARE THERE ANY APPARENT SIGNS OF INJURIES/BEHAVIORS THAT COULD BE RELATED TO ABUSE/NEGLECT?: NO

## 2024-08-08 SDOH — SOCIAL STABILITY: SOCIAL INSECURITY: ARE YOU OR HAVE YOU BEEN THREATENED OR ABUSED PHYSICALLY, EMOTIONALLY, OR SEXUALLY BY ANYONE?: NO

## 2024-08-08 SDOH — SOCIAL STABILITY: SOCIAL INSECURITY: HAVE YOU HAD THOUGHTS OF HARMING ANYONE ELSE?: NO

## 2024-08-08 SDOH — SOCIAL STABILITY: SOCIAL INSECURITY: WERE YOU ABLE TO COMPLETE ALL THE BEHAVIORAL HEALTH SCREENINGS?: YES

## 2024-08-08 SDOH — SOCIAL STABILITY: SOCIAL INSECURITY: DO YOU FEEL ANYONE HAS EXPLOITED OR TAKEN ADVANTAGE OF YOU FINANCIALLY OR OF YOUR PERSONAL PROPERTY?: NO

## 2024-08-08 SDOH — SOCIAL STABILITY: SOCIAL INSECURITY: DO YOU FEEL UNSAFE GOING BACK TO THE PLACE WHERE YOU ARE LIVING?: NO

## 2024-08-08 SDOH — SOCIAL STABILITY: SOCIAL INSECURITY: HAS ANYONE EVER THREATENED TO HURT YOUR FAMILY OR YOUR PETS?: NO

## 2024-08-08 SDOH — SOCIAL STABILITY: SOCIAL INSECURITY: HAVE YOU HAD ANY THOUGHTS OF HARMING ANYONE ELSE?: NO

## 2024-08-08 SDOH — SOCIAL STABILITY: SOCIAL INSECURITY: DOES ANYONE TRY TO KEEP YOU FROM HAVING/CONTACTING OTHER FRIENDS OR DOING THINGS OUTSIDE YOUR HOME?: NO

## 2024-08-08 SDOH — SOCIAL STABILITY: SOCIAL INSECURITY: ABUSE: ADULT

## 2024-08-08 ASSESSMENT — ACTIVITIES OF DAILY LIVING (ADL)
HEARING - RIGHT EAR: FUNCTIONAL
FEEDING YOURSELF: INDEPENDENT
LACK_OF_TRANSPORTATION: NO
GROOMING: INDEPENDENT
JUDGMENT_ADEQUATE_SAFELY_COMPLETE_DAILY_ACTIVITIES: YES
BATHING: INDEPENDENT
WALKS IN HOME: INDEPENDENT
PATIENT'S MEMORY ADEQUATE TO SAFELY COMPLETE DAILY ACTIVITIES?: YES
TOILETING: INDEPENDENT
ADEQUATE_TO_COMPLETE_ADL: YES
HEARING - LEFT EAR: FUNCTIONAL
DRESSING YOURSELF: INDEPENDENT

## 2024-08-08 ASSESSMENT — COGNITIVE AND FUNCTIONAL STATUS - GENERAL
PATIENT BASELINE BEDBOUND: NO
DAILY ACTIVITIY SCORE: 24
MOBILITY SCORE: 24

## 2024-08-08 ASSESSMENT — COLUMBIA-SUICIDE SEVERITY RATING SCALE - C-SSRS
6. HAVE YOU EVER DONE ANYTHING, STARTED TO DO ANYTHING, OR PREPARED TO DO ANYTHING TO END YOUR LIFE?: NO
5. HAVE YOU STARTED TO WORK OUT OR WORKED OUT THE DETAILS OF HOW TO KILL YOURSELF? DO YOU INTEND TO CARRY OUT THIS PLAN?: NO
1. IN THE PAST MONTH, HAVE YOU WISHED YOU WERE DEAD OR WISHED YOU COULD GO TO SLEEP AND NOT WAKE UP?: NO
2. HAVE YOU ACTUALLY HAD ANY THOUGHTS OF KILLING YOURSELF?: NO

## 2024-08-08 ASSESSMENT — PAIN - FUNCTIONAL ASSESSMENT
PAIN_FUNCTIONAL_ASSESSMENT: 0-10

## 2024-08-08 ASSESSMENT — LIFESTYLE VARIABLES
SKIP TO QUESTIONS 9-10: 1
HOW OFTEN DO YOU HAVE A DRINK CONTAINING ALCOHOL: NEVER
SUBSTANCE_ABUSE_PAST_12_MONTHS: NO
AUDIT-C TOTAL SCORE: 0
PRESCIPTION_ABUSE_PAST_12_MONTHS: NO
HOW MANY STANDARD DRINKS CONTAINING ALCOHOL DO YOU HAVE ON A TYPICAL DAY: PATIENT DOES NOT DRINK
AUDIT-C TOTAL SCORE: 0
HOW OFTEN DO YOU HAVE 6 OR MORE DRINKS ON ONE OCCASION: NEVER

## 2024-08-08 ASSESSMENT — PAIN SCALES - GENERAL
PAINLEVEL_OUTOF10: 0 - NO PAIN
PAINLEVEL_OUTOF10: 0 - NO PAIN
PAINLEVEL_OUTOF10: 5 - MODERATE PAIN
PAINLEVEL_OUTOF10: 0 - NO PAIN
PAINLEVEL_OUTOF10: 0 - NO PAIN

## 2024-08-08 ASSESSMENT — PAIN DESCRIPTION - LOCATION: LOCATION: HEAD

## 2024-08-08 NOTE — H&P
Medical Intensive Care - History and Physical   Subjective    Stacey Heath is a 54 y.o. year old female patient admitted on 8/8/2024 with following ICU needs: hypertensive emergency and flash pulmonary edema requiring respiratory support and urgent hemodialysis     HPI:  Stacey Heath is a 53 y.o. female with PMH ESRD 2/2 HTN and diabetes on dialysis T/Th/Sat via RUE AVF (last complete session 8/6) and poorly controlled hypertension, DMT2, COPD, brachial DVT on Eliquis (4/14/2024) admitted to [facility] MICU for hypertensive emergency and acute hypoxic respiratory failure requiring BiPAP.  Patient presented to ED with worsening dyspnea on exertion and at rest over the past two days. Patient endorses feeling volume overloaded and complains of headache.  States her last session of dialysis was on Tuesday 8/6 as per her dialysis schedule and that she got a full session with 1.5 L removed. On presentation to the ED she was hypertensive with BP of 204/88 and tachypneic with RR 22.  She has multiple recent admissions over the past two months for the same chief complaint. She was most recently admitted from ED to MICU on 7/22 for hypertensive emergency. She states that she has not missed any dialysis appointments leading up to these events, suggesting that her BP regimen is not optimized.     In the ED the patient was started on a nitroglycerine drip for BP 200s/100s. She was given respiratory support by BIPAP, but was not hypoxic. She was transferred to the MICU for hypertensive emergency with flash pulmonary edema requiring respiratory support and urgent hemodialysis. Once in the MICU the patient was weaned to room air.     Dialysis history:   Hemodialysis: Current dialysis Tuesday Thursday Saturday.   Dry weight 52 kg.    Via right upper extremity AV fistula.    ROS: oliguric, no urinary retention/hematuria/recurrent UTIs.    Kidney stone requiring cystoscopy removal   Disease Etiology:  diabetic nephropathy and  hypertensive nephropathy.   Disease Complications:  congestive heart failure, dyslipidemia and hypertension.       Meds    Home medications:  Current Outpatient Medications   Medication Instructions    acetaminophen (TYLENOL) 650 mg, oral, Every 4 hours PRN    albuterol sulfate (Proair Digihaler) 90 mcg/actuation aero powdr breath act w/sensor inhaler 2 puffs, inhalation, 2 times daily PRN    albuterol 1.25 mg, nebulization, Every 6 hours PRN    apixaban (ELIQUIS) 5 mg, oral, 2 times daily    aspirin 81 mg, oral, Daily    atorvastatin (LIPITOR) 80 mg, oral, Nightly    B complex-vitamin C-folic acid (Nephrocaps) 1 mg capsule 1 capsule, oral, Daily    benzonatate (TESSALON) 100 mg, oral, 3 times daily PRN, Do not crush or chew.    calcium acetate (PHOSLO) 1,334 mg, oral, 3 times daily (morning, midday, late afternoon)    carvedilol (COREG) 50 mg, oral, 2 times daily    cloNIDine (CATAPRES) 0.2 mg, oral, 3 times daily    diphenhydrAMINE (BENADRYL) 25 mg, oral, Nightly PRN    epoetin joceline (EPOGEN,PROCRIT) 10,000 Units, subcutaneous, 3 times weekly    fluticasone (Flonase) 50 mcg/actuation nasal spray 2 sprays, Each Nostril, Daily, Shake gently. Before first use, prime pump. After use, clean tip and replace cap.    fluticasone furoate-vilanteroL (Breo Ellipta) 100-25 mcg/dose inhaler 1 puff, inhalation, Daily RT    gabapentin (NEURONTIN) 300 mg, oral, 3 times daily    hydrALAZINE (APRESOLINE) 100 mg, oral, 2 times daily    isosorbide mononitrate ER (IMDUR) 120 mg, oral, Daily, Do not crush or chew.    NIFEdipine ER (ADALAT CC) 90 mg, oral, Daily before breakfast, Do not crush, chew, or split.    polyethylene glycol (Glycolax, Miralax) 17 gram/dose powder Take 17 g (1 scoop dissolved in liquid) by mouth once daily. Do not start before July 26, 2024.    sodium chloride (Ocean) 0.65 % nasal spray 1 spray, Each Nostril, 4 times daily PRN    torsemide (Demadex) 20 mg tablet Take 2 tablets once daily on non-dialysis days only.  "Do not take on dialysis days    valsartan (DIOVAN) 320 mg, oral, Daily        Inpatient medications:  Scheduled medications     Continuous medications  nitroglycerin, 5-200 mcg/min, Last Rate: 100 mcg/min (08/08/24 0621)      PRN medications  PRN medications: oxygen     Objective    Blood pressure 160/66, pulse 90, temperature 36.1 °C (97 °F), temperature source Temporal, resp. rate 18, height 1.397 m (4' 7\"), weight 58.9 kg (129 lb 13.6 oz), SpO2 100%.     Physical Exam  Constitutional:       General: She is not in acute distress.     Appearance: Normal appearance. She is not ill-appearing.   HENT:      Head: Normocephalic and atraumatic.      Nose: Nose normal.      Mouth/Throat:      Mouth: Mucous membranes are dry.   Eyes:      General: No scleral icterus.     Extraocular Movements: Extraocular movements intact.      Pupils: Pupils are equal, round, and reactive to light.   Cardiovascular:      Rate and Rhythm: Normal rate and regular rhythm.      Pulses: Normal pulses.      Heart sounds: Murmur heard.   Pulmonary:      Effort: Respiratory distress present.      Breath sounds: Normal breath sounds. No wheezing.   Abdominal:      General: Abdomen is flat. Bowel sounds are normal. There is no distension.      Palpations: Abdomen is soft.      Tenderness: There is no abdominal tenderness.   Musculoskeletal:         General: Normal range of motion.      Cervical back: Normal range of motion.      Right lower leg: Edema (1+ mid-calf) present.      Left lower leg: Edema (1+ mid-calf) present.   Skin:     General: Skin is warm and dry.      Coloration: Skin is not jaundiced.      Findings: No bruising.   Neurological:      General: No focal deficit present.      Mental Status: She is alert and oriented to person, place, and time. Mental status is at baseline.   Psychiatric:         Mood and Affect: Mood normal.         Behavior: Behavior normal.         Thought Content: Thought content normal.         Judgment: " Judgment normal.          Intake/Output Summary (Last 24 hours) at 8/8/2024 0643  Last data filed at 8/8/2024 0621  Gross per 24 hour   Intake 53.5 ml   Output --   Net 53.5 ml     Labs:   Results from last 72 hours   Lab Units 08/08/24  0249   SODIUM mmol/L 141   POTASSIUM mmol/L 5.4*   CHLORIDE mmol/L 101   CO2 mmol/L 26   BUN mg/dL 63*   CREATININE mg/dL 11.14*   GLUCOSE mg/dL 154*   CALCIUM mg/dL 9.2   ANION GAP mmol/L 19   EGFR mL/min/1.73m*2 4*      Results from last 72 hours   Lab Units 08/08/24  0249   WBC AUTO x10*3/uL 5.6   HEMOGLOBIN g/dL 8.7*   HEMATOCRIT % 27.8*   PLATELETS AUTO x10*3/uL 261   NEUTROS PCT AUTO % 65.0   LYMPHS PCT AUTO % 17.7   MONOS PCT AUTO % 6.8   EOS PCT AUTO % 8.8        Micro/ID:     Lab Results   Component Value Date    URINECULTURE NO SIGNIFICANT GROWTH. 07/09/2023    BLOODCULT  08/21/2023     No Growth at 1 days~No Growth at 2 days~No Growth at 3 days~NO GROWTH at 4 days - FINAL REPORT       Summary of Key Imaging Results  XR chest 1 view  Result Date: 8/8/2024  1. Compared to 07/22/2024, progressed pulmonary edema more confluent alveolar edema in the central and lower lung zones bilaterally. 2. Stable small left pleural effusion with new associated subsegmental atelectasis.       I personally reviewed the images/study and I agree with Dr. Osmany Covington findings as stated. This study was interpreted at [facility], [place], [place]   MACRO: None   Signed by: Mitchell Alegria 8/8/2024 2:43 AM Dictation workstation:   [id number]    XR chest 1 view  Result Date: 7/22/2024  Findings of mild pulmonary edema less pronounced when compared to prior study.     MACRO: None.   Signed by: Mitchell Alegria 7/22/2024 3:54 AM Dictation workstation:   [id number]      Assessment and Plan     Assessment:  Stacey Heath is a 54 y.o. year old female patient originally presenting to the [facility] ED for shortness of breath and increased work of breathing she was  admitted to the MICU on 08/08/24 for urgent hemodialysis and respiratory support in the setting of flash pulmonary edema and hypertensive emergency 2/2 volume overload.     Mechanical Ventilation: none  Sedation/Analgesia:  none  Restraints: no    Plan:  NEUROLOGY/PSYCH:  Dx: Headache 2/2 hypertensive emergency vs nitroglycerin gtt  Acetaminophen 650 as needed  Wean off nitroglycerine gtt    CARDIOVASCULAR:  Dx: Hypertensive emergency  2/2 volume overload   Home medications: Amlodipine, Coreg 50 BID,  Clonidine 0.2 TID (given 0.1), isosorbide 120, nifedipine 90, valsartan 320   Management:  Hemodialysis per nephrology  Nitroglycerine drip wean as tolerated  Restarted following home medications: carvedilol, clonidine, hydralazine, nifedipine, valsartan   Will require outpatient optimization    Dx: HFpEF  Echo 6/2023 EF 55-60%  Torsemide on non-dialysis days  S/p 80 mg lasix in ED on 8/8  Management:  Optimization of home medications outpatient    PULMONARY:  Dx: Acute hypoxic respiratory failure 2/2 flash pulmonary edema with volume overload  Stable on room air   Management:  Plan for dialysis per nephrology   RFP evaluation in the morning    Dx: COPD  7/2022: FEV1/FVC 75.22, mild obstructive pattern  Follows with pulmonology  Management:  Goal O2 sat >94%, titrate O2 as needed  Continue home albuterol PRN and breo ellipta     RENAL/GENITOURINARY:  Dx: ESRD   HD T/Th/Sun at [facility], follows with Dr. Garcia  Hypertensive emergency, flash pulmonary edema   Access RUE AVF  Dry weight: 54kg from record  S/p lasix 80 mg in ED  Management:  Urgent hemodialysis while inpatient    GASTROENTEROLOGY:  Dx: NA    ENDOCRINOLOGY:  Dx: T2DM   C/b retinopathy, nephropathy, neuropathy  No medications  Management:  Continue renal diet     HEMATOLOGY:  Dx: Branchial DVT (4/2024)  Management:  Continue home medications (Eliquis 5 mg BID)    MUSCULOSKELETAL/ SKIN:  Dx: NA    INFECTIOUS DISEASE:  Dx: NA    ICU Check List      FEN  Fluids: restricted  Electrolytes: PRN  Nutrition: renal diet  Prophylaxis:  DVT ppx: Apixaban 6 mg BID  GI ppx: na  Bowel care: polyethylene glycol  Hardware:  Catheter: NA  Access: PIV  Drains:   Lines: if staying in MICU will need central line  Social:  Code: Full Code    NOK: Diya Jang (child) [phone]; Hai Pinedo (significant other) [phone]; Rohini Piña (child) [phone]  Disposition:     SHEILA MARTINEZMONARMANI, MS4   08/08/24 at 6:43 AM     Disclaimer: Documentation completed with the information available at the time of input. The times in the chart may not be reflective of actual patient care times, interventions, or procedures. Documentation occurs after the physical care of the patient.

## 2024-08-08 NOTE — CARE PLAN
Problem: Fall/Injury  Goal: Not fall by end of shift  Outcome: Met  Goal: Be free from injury by end of the shift  Outcome: Met  Goal: Verbalize understanding of personal risk factors for fall in the hospital  Outcome: Met  Goal: Verbalize understanding of risk factor reduction measures to prevent injury from fall in the home  Outcome: Met     Problem: Pain - Adult  Goal: Verbalizes/displays adequate comfort level or baseline comfort level  Outcome: Met     Problem: Safety - Adult  Goal: Free from fall injury  Outcome: Met

## 2024-08-08 NOTE — ED PROVIDER NOTES
Emergency Department Provider Note        History of Present Illness     History provided by: Patient  Limitations to History: None  External Records Reviewed with Brief Summary:  Reviewed ED presentation from 2024 in which patient presented with acute pulmonary edema requiring BiPAP and MICU admission for emergent dialysis.    HPI:  Stacey Heath is a 54 female history of hypertension, T2DM, COPD, ESRD (T/Th/Sat) presenting to the ED with concern for shortness of breath and volume overload.  Patient's last dialysis session was on Tuesday, reports she got a full session with approximately 1.5 L off.  States that today she began noticing increasing shortness of breath acutely worsening in the last 2 hours.  Feels like it is similar to her previous hospital presentations with fluid overload.  Denies fever, chills but does endorse a cough without productive sputum.  Denies any chest pain.  States that she still makes small amount of urine is on a diuretic at home.    Physical Exam   Triage vitals:  T 36.2 °C (97.2 °F)  HR 91  BP (!) 204/88  RR (!) 22  O2 96 % None (Room air)    General: Awake, alert, in no acute distress  Eyes: Gaze conjugate.  No scleral icterus or injection  HENT: Normo-cephalic, atraumatic. No stridor  CV: Regular rate, regular rhythm. Radial pulses 2+ bilaterally  Resp: Breathing tachypneic, speaking in full sentences on 3 L nasal cannula.  Diminished breath sounds bilaterally.  GI: Soft, non-distended, non-tender. No rebound or guarding.  MSK/Extremities: No gross bony deformities. Moving all extremities  Skin: Warm. Appropriate color  Neuro: Alert. Oriented. Face symmetric. Speech is fluent.  Gross strength and sensation intact in b/l UE and LEs  Psych: Appropriate mood and affect    Medical Decision Making & ED Course   Medical Decision Makin-year-old female history of hypertension, diabetes, COPD, ESRD () presenting to the ED for evaluation of shortness  of breath and volume overload.  Received full dialysis session on Tuesday however coming in today with diffuse B-lines bilaterally, increased work of breathing, placed on 3 L supplemental O2.  Concern for acute pulmonary edema therefore patient was treated with sublingual nitro and placed on BiPAP.  Required ultrasound IV placement due to significant access.  Patient initially doing well with improvement in BP and was able to be weaned off BiPAP however lost IV access, had to have an EJ placed and at that time patient was found to be tripoding with significant increased work of breathing and hypertension.  Given additional sublingual nitroglycerin and started on a nitroglycerin drip, placed back on BiPAP with improvement in symptoms.  Labs notable for an elevated BNP, chest x-ray notable for pulmonary edema, discussed patient with nephrology given need for urgent/emergent dialysis in the setting of acute pulmonary or edema quiring BiPAP.  ECG nonischemic, troponin with repeat negative, low concern for ACS.  Low concern for pneumonia given lack of leukocytosis, afebrile, presentation more consistent with volume overload.  Patient was discussed with ICU attending and admitted for further management.  ----      Differential diagnoses considered include but are not limited to: Flash pulmonary edema, volume overload, CHF exacerbation, ESRD on dialysis, pneumonia     Social Determinants of Health which Significantly Impact Care: None identified     EKG Independent Interpretation: EKG interpreted by myself. Please see ED Course for full interpretation.    Independent Result Review and Interpretation: Relevant laboratory and radiographic results were reviewed and independently interpreted by myself.  As necessary, they are commented on in the ED Course.    Chronic conditions affecting the patient's care: As documented above in MDM    The patient was discussed with the following consultants/services:    ICU attending who  accepted the patient for admission .  Nephrology regarding urgent dialysis.    Care Considerations: As documented above in MDM    ED Course:  ED Course as of 08/08/24 2146   Thu Aug 08, 2024   0107 Electrocardiogram, 12-lead PRN ACS symptoms  Sinus rhythm rate 92, right axis.  No acute ST segment elevation or depression however interpretation limited due to baseline artifact.  T wave inversions evident in inferolateral leads.  , QRS 74, QTc 442. [KR]   0339 Emergency Medicine Attending Attestation:     [unfilled]    The patient was seen by the resident/fellow.  I have personally performed a substantive portion of the encounter.  I have seen and examined the patient; agree with the workup, evaluation, MDM, management and diagnosis.  The care plan has been discussed with the resident; I have reviewed the resident's note and agree with the documented findings.      Patient is a 54-year-old female with history of end-stage renal disease (on dialysis Tuesday, Thursday, Saturday) who presents to the emergency department for shortness of breath and hypertension.  Patient has history of flash pulmonary edema.  She states that she has been taking her antihypertensives, last got dialysis on Tuesday, but given her shortness of breath and her history of symptoms she wanted to come in early at this time.  She denies any chest pain, fever, nausea/vomiting, lightness/dizziness.  Patient's exam is notable for coarse breath sounds bilaterally, although she appears comfortable in the gurney without any tripoding or objective increased work of breathing.  Her abdomen is soft and benign, her heart rate is normal.  Given her hypertension, appearance of fluid overload, with likely need for dialysis she will ultimately require admission.  We we will start her on BiPAP, give her nitroglycerin sublingual and evaluate for flash pulmonary edema and other causes of shortness of breath.    I independently interpreted patient's EKG and  agree with the above mentioned interpretation.          Luigi Perkins MD   [AM]   0332 XR chest 1 view  Chest x-ray showing pulmonary edema, stable small left pleural effusion [KR]   0409 BNP(!): 2,200  Being diuresed with Lasix [KR]   0439 Called to bedside, patient was trialed off of BiPAP after improvement with initial diuresis and sublingual nitro however she was noted to be tripoding with significant increased work of breathing and hypertensive with signs of pulmonary edema and therefore was given sublingual nitro with plan to start nitroglycerin drip and BiPAP initiation.  Will reach out to MICU attending for admission for urgent dialysis and acute respiratory failure requiring BiPAP. [KR]      ED Course User Index  [AM] Luigi Perkins MD  [KR] Sandhya Kahn DO         Diagnoses as of 08/08/24 2146   Hypervolemia, unspecified hypervolemia type   ESRD on dialysis (Multi)     Disposition   As a result of their workup, the patient will require admission to the hospital.  The patient was informed of her diagnosis.  The patient was given the opportunity to ask questions and I answered them. The patient agreed to be admitted to the hospital.    Procedures   Procedures    Patient seen and discussed with ED attending physician.    Sandhya Kahn DO  Emergency Medicine       Sandhya Kahn DO  Resident  08/08/24 8870

## 2024-08-08 NOTE — PROGRESS NOTES
ICU to [facility] Transfer Summary     I:  ICU Admission Reason & Brief ICU Course:    Stacey Heath is a 53 y.o. 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 The patient was given 80 mg lasix in the ED without significant output and started on nitroglycerine gtt. She was transferred to the MICU on NC, but was weaned to RA upon arrival. In the MICU she remained on the nitroglycerine drip and was restarted on her home medications of amlodipine, Coreg 50 BID,  Clonidine 0.2 TID (given 0.1), isosorbide 120, nifedipine 90, valsartan 320. Hemodialysis was started ~2:30 PM with plans for 3 hours dialysis and -2L removal. If clinically improved after hemodialysis and not requiring nitroglycerine gtt for >2  hrs, she can be transferred to the floor.       C: Code Status/DPOA Info/Goals of Care/ACP Note    Full Code  NOK: Diya Jang (child) [phone]; Hai Pinedo (significant other) [phone]; Rohini Piña (child) [phone]     U: Unprescribing & Pertinent High-Risk Medications    Changes to home meds: Na     Anticoagulation: Eliquis 5 mg BID (hx of DVT)     Antibiotics:   [x] N/A - no current planned antimicrobioals  []   indication  start date  planned duration     P: Pending Tests at the Time of Transfer         A: Active consultants, including Rehab:   []  Subspecialty Consultants: nephrology  [x]  PT  [x]  OT  []  SLP  []  Wound Care    U: Uncertainty Measure/Diagnostic Pause:    Working diagnosis at the time of transfer flash pulmonary edema and hypertensive emergency 2/2 volume overload in setting of ESRD, though ddx includes HF exacerbation     Diagnosis Degree of Certainty: 1. High degree of certainty about the clinical diagnosis.     S: Summary of Major Problems  and To-Dos:   CARDIOVASCULAR:  Dx: Hypertensive emergency 2/2 volume overload   Home medications: Amlodipine, Coreg 50 BID,  Clonidine 0.2 TID (given 0.1), isosorbide 120, nifedipine 90, valsartan 320   Management:  Hemodialysis per nephrology (MICU)  Nitroglycerine drip wean as tolerated (MICU)  Restarted following home medications: carvedilol, clonidine, hydralazine, nifedipine, valsartan   Will require outpatient optimization    Dx: Acute hypoxic respiratory failure 2/2 flash pulmonary edema with volume overload  Stable on room air   Management:  Plan for dialysis per nephrology      Dx: COPD  7/2022: FEV1/FVC 75.22, mild obstructive pattern  Follows with pulmonology  Management:  Goal O2 sat >94%, titrate O2 as needed  Continue home albuterol PRN and breo ellipta     RENAL/GENITOURINARY:  Dx: ESRD   HD T/Th/Sun at [facility], follows with Dr. Garcia  Hypertensive emergency, flash pulmonary edema   Access RUE AVF  Dry weight: 54kg from record  S/p lasix 80 mg in ED  Management:  Hemodialysis while in MICU   Fluid restriction, renal diet     To-do list prior to transfer:  []Complete dialysis   []ECG after dialysis    []Wean off nitroglycerine drip, >2 hrs off drip      E: Exam, including Lines/Drains/Airways & Data Review:   Physical Exam  Constitutional:       General: She is not in acute distress.     Appearance: Normal appearance. She is not ill-appearing.   HENT:      Head: Normocephalic and atraumatic.      Nose: Nose normal.      Mouth/Throat:      Mouth: Mucous membranes are dry.   Eyes:      General: No scleral icterus.     Extraocular Movements: Extraocular movements intact.      Pupils: Pupils are equal, round, and reactive to light.   Cardiovascular:      Rate and Rhythm: Normal rate and regular rhythm.      Pulses: Normal pulses.      Heart sounds: Murmur heard.   Pulmonary:      Effort present.      Breath sounds: Normal breath sounds. No wheezing.   Abdominal:      General: Abdomen is flat.  Bowel sounds are normal. There is no distension.      Palpations: Abdomen is soft.      Tenderness: There is no abdominal tenderness.   Musculoskeletal:         General: Normal range of motion.      Cervical back: Normal range of motion.      Right lower leg: Edema (1+ mid-calf) present.      Left lower leg: Edema (1+ mid-calf) present.   Skin:     General: Skin is warm and dry.      Coloration: Skin is not jaundiced.      Findings: No bruising.   Neurological:      General: No focal deficit present.      Mental Status: She is alert and oriented to person, place, and time. Mental status is at baseline.   Psychiatric:         Mood and Affect: Mood normal.         Behavior: Behavior normal.         Thought Content: Thought content normal.         Judgment: Judgment normal.      Difficult airway? N/A  Lines/drains assessed for removal? No    Within 30 minutes of the patient physically leaving the floor, a Floor Readiness Note needs to be placed with updated vitals.

## 2024-08-08 NOTE — CONSULTS
"NEPHROLOGY NEW CONSULT NOTE   Stacey Heath   54 y.o.    @WT@  MRN/Room: [id number]/25/25-A    Reason for consult: ESRD for HD    HPI:  Stacey Heath is a 54 y.o. female with a PMHx of HTN, DM, COPD, Brachial DVT, ESRD on Regular HD TTS through [facility] in [facility] with plans to go back to [facility]. Though she did not miss any sessions she has been admitted multiple times over the past month for HTNsive emergency and Pulmonary Edema Requiring ICU admission and emergent Dialysis. Today she also presents with the same picture.    Nephrology consulted for ESRD for HD          In The ER: /78   Pulse 91   Temp 36.2 °C (97.2 °F) (Temporal)   Resp 23   Ht 1.397 m (4' 7\")   Wt 58.9 kg (129 lb 13.6 oz)   SpO2 100%   BMI 30.18 kg/m²      Past Medical History:   Diagnosis Date    Bacteremia due to methicillin resistant Staphylococcus aureus 07/08/2024    Cardiac/pericardial tamponade ([facility]) 01/20/2024    CHF (congestive heart failure) (Multi)     COPD (chronic obstructive pulmonary disease) (Multi)     Coronary artery disease     Disorder of sweat glands 02/25/2023    Dry eye syndrome of bilateral lacrimal glands 03/07/2017    Dry eyes    ESRD (end stage renal disease) (Multi)     Essential (primary) hypertension 12/27/2022    Hypertension    History of acute pancreatitis 12/21/2020    History of acute pancreatitis    Low grade squamous intraepithelial lesion (LGSIL) on cervicovaginal cytologic smear 02/25/2023    Migraines     History of migraine    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    Type 2 myocardial infarction (Multi) 01/20/2024      Past Surgical History:   Procedure Laterality Date    APPENDECTOMY  03/07/2017    Appendectomy    CT ABDOMEN ANGIOGRAM W AND/OR WO IV CONTRAST  4/23/2023    CT ABDOMEN ANGIOGRAM W AND/OR WO IV CONTRAST [facility]    OTHER SURGICAL HISTORY  03/07/2017    Cystoscopy With Pyeloscopy With Removal Of Calculus    OTHER SURGICAL HISTORY  03/07/2017    Anoscopy For " Polyp Removal    OTHER SURGICAL HISTORY  12/08/2021    Arteriovenous fistula creation procedure    OTHER SURGICAL HISTORY  12/08/2021    Dialysis tunneled catheter placement    TUBAL LIGATION  03/07/2017    Tubal Ligation      Family History   Problem Relation Name Age of Onset    Other (Cerebrovascular Accident) Father      Heart failure Paternal Grandmother      Breast cancer Paternal Grandmother      Other (Primary Cervical Cancer) Paternal Grandmother      Diabetes Paternal Grandfather      Breast cancer Father's Sister      Ovarian cancer Father's Sister       Social History     Socioeconomic History    Marital status: Single     Spouse name: Not on file    Number of children: Not on file    Years of education: Not on file    Highest education level: Not on file   Occupational History    Not on file   Tobacco Use    Smoking status: Former     Types: Cigarettes    Smokeless tobacco: Never   Vaping Use    Vaping status: Never Used   Substance and Sexual Activity    Alcohol use: Not Currently    Drug use: Not Currently     Types: Cocaine, Marijuana    Sexual activity: Defer   Other Topics Concern    Not on file   Social History Narrative    Not on file     Social Determinants of Health     Financial Resource Strain: Low Risk  (8/8/2024)    Overall Financial Resource Strain (CARDIA)     Difficulty of Paying Living Expenses: Not hard at all   Recent Concern: Financial Resource Strain - High Risk (5/29/2024)    Overall Financial Resource Strain (CARDIA)     Difficulty of Paying Living Expenses: Very hard   Food Insecurity: No Food Insecurity (7/15/2024)    Hunger Vital Sign     Worried About Running Out of Food in the Last Year: Never true     Ran Out of Food in the Last Year: Never true   Transportation Needs: No Transportation Needs (8/8/2024)    PRAPARE - Transportation     Lack of Transportation (Medical): No     Lack of Transportation (Non-Medical): No   Physical Activity: Sufficiently Active (7/22/2024)     Exercise Vital Sign     Days of Exercise per Week: 4 days     Minutes of Exercise per Session: 60 min   Stress: No Stress Concern Present (7/15/2024)    [employer] [place] [employer] - Occupational Stress Questionnaire     Feeling of Stress : Only a little   Social Connections: Moderately Integrated (7/15/2024)    Social Connection and Isolation Panel [NHANES]     Frequency of Communication with Friends and Family: More than three times a week     Frequency of Social Gatherings with Friends and Family: Once a week     Attends [employer]ism Services: More than 4 times per year     Active Member of Clubs or Organizations: No     Attends Club or Organization Meetings: Not on file     Marital Status: Living with partner   Intimate Partner Violence: Not At Risk (7/15/2024)    Humiliation, Afraid, Rape, and Kick questionnaire     Fear of Current or Ex-Partner: No     Emotionally Abused: No     Physically Abused: No     Sexually Abused: No   Housing Stability: Low Risk  (8/8/2024)    Housing Stability Vital Sign     Unable to Pay for Housing in the Last Year: No     Number of Times Moved in the Last Year: 1     Homeless in the Last Year: No       Allergies   Allergen Reactions    Iodine Hives, Itching and Unknown    Bioflavonoids Swelling    Codeine Itching, Hives and Unknown     Tolerates percocet   Tolerates percocet    Tolerates percocet    Flowers Itching    Hydromorphone Itching    Shellfish Containing Products Swelling     SEAFOOD        Medications Prior to Admission   Medication Sig Dispense Refill Last Dose    acetaminophen (Tylenol) 325 mg tablet Take 2 tablets (650 mg) by mouth every 4 hours if needed for mild pain (1 - 3) or moderate pain (4 - 6). 30 tablet 0     albuterol 1.25 mg/3 mL nebulizer solution Take 3 mL (1.25 mg) by nebulization every 6 hours if needed for wheezing. 90 mL 11     albuterol sulfate (Proair Digihaler) 90 mcg/actuation aero powdr breath act w/sensor inhaler Inhale 2 puffs 2 times  a day as needed for wheezing or shortness of breath.       apixaban (Eliquis) 5 mg tablet Take 1 tablet (5 mg) by mouth 2 times a day. 60 tablet 2     aspirin 81 mg EC tablet Take 1 tablet (81 mg) by mouth once daily.       atorvastatin (Lipitor) 80 mg tablet Take 1 tablet (80 mg) by mouth once daily at bedtime. 30 tablet 11     B complex-vitamin C-folic acid (Nephrocaps) 1 mg capsule Take 1 capsule by mouth once daily. 30 capsule 0     benzonatate (Tessalon) 100 mg capsule Take 1 capsule (100 mg) by mouth 3 times a day as needed for cough. Do not crush or chew. 20 capsule 0     calcium acetate (Phoslo) 667 mg capsule Take 2 capsules (1,334 mg) by mouth 3 times daily (morning, midday, late afternoon). 180 capsule 0     carvedilol (Coreg) 25 mg tablet Take 2 tablets (50 mg) by mouth 2 times a day. 120 tablet 1     cloNIDine (Catapres) 0.2 mg tablet Take 1 tablet (0.2 mg) by mouth 3 times a day. 90 tablet 1     diphenhydrAMINE (BENADryl) 25 mg capsule Take 1 capsule (25 mg) by mouth as needed at bedtime for itching or sleep.       epoetin joceline (Epogen,Procrit) 10,000 unit/mL injection Inject 1 mL (10,000 Units) under the skin 3 times a week.       fluticasone (Flonase) 50 mcg/actuation nasal spray Administer 2 sprays into each nostril once daily. Shake gently. Before first use, prime pump. After use, clean tip and replace cap. 16 g 12     fluticasone furoate-vilanteroL (Breo Ellipta) 100-25 mcg/dose inhaler Inhale 1 puff once daily. 60 each 11     gabapentin (Neurontin) 300 mg capsule Take 1 capsule (300 mg) by mouth 3 times a day.       hydrALAZINE (Apresoline) 100 mg tablet Take 1 tablet (100 mg) by mouth 2 times a day. 60 tablet 2     isosorbide mononitrate ER (Imdur) 120 mg 24 hr tablet Take 1 tablet (120 mg) by mouth once daily. Do not crush or chew. 30 tablet 0     NIFEdipine ER (Adalat CC) 90 mg 24 hr tablet Take 1 tablet (90 mg) by mouth once daily in the morning. Take before meals. Do not crush, chew, or  split. 30 tablet 0     polyethylene glycol (Glycolax, Miralax) 17 gram/dose powder Take 17 g (1 scoop dissolved in liquid) by mouth once daily. Do not start before July 26, 2024. 510 g 0     sodium chloride (Ocean) 0.65 % nasal spray Administer 1 spray into each nostril 4 times a day as needed for congestion. 44 mL 12     torsemide (Demadex) 20 mg tablet Take 2 tablets once daily on non-dialysis days only. Do not take on dialysis days 60 tablet 2     valsartan (Diovan) 320 mg tablet Take 1 tablet (320 mg) by mouth once daily. 30 tablet 1         Meds:   apixaban, 5 mg, BID  aspirin, 81 mg, Daily  atorvastatin, 80 mg, Nightly  B complex-vitamin C-folic acid, 1 capsule, Daily  calcium acetate, 1,334 mg, TID  carvedilol, 50 mg, BID  cloNIDine, 0.2 mg, q8h BELEN  fluticasone, 2 spray, Daily  fluticasone furoate-vilanteroL, 1 puff, Daily  gabapentin, 300 mg, Daily  hydrALAZINE, 100 mg, BID  [Held by provider] isosorbide mononitrate ER, 120 mg, Daily  NIFEdipine ER, 90 mg, Daily before breakfast  polyethylene glycol, 17 g, Daily  [Held by provider] torsemide, 20 mg, Daily  valsartan, 320 mg, Daily      nitroglycerin, Last Rate: 120 mcg/min (08/08/24 1337)      albuterol, 1.25 mg, q6h PRN  oxygen, , Continuous PRN - O2/gases  sodium chloride, 1 spray, 4x daily PRN        Vitals:    08/08/24 1155   BP:    Pulse:    Resp:    Temp: 36.2 °C (97.2 °F)   SpO2:         08/06 1900 - 08/08 0659  In: 53.5 [I.V.:53.5]  Out: -    Weight change:      General appearance: AAOx3. No distress (Was on nitroglycerine infusion)  Eyes: non-icteric  Skin: no apparent rash  Heart: s1s2 regular.  Lungs: CTA bilat.  no wheezing/ inspiratory crackles audible bilaterally  Abdomen: soft, nt/nd  Extremities: trace edema bilat  ACCESS: [facility]      ASSESSMENT:  Stacey Heath is a 54 y.o. female with a PMHx of HTN, DM, COPD, Brachial DVT, ESRD on Regular HD TTS through [facility] in [facility] with plans to go back to [facility]. Though she did not miss  any sessions she has been admitted multiple times over the past month for HTNsive emergency and Pulmonary Edema Requiring ICU admission and emergent Dialysis. Today she also presents with the same picture.    On examination she has JVD, Pulmonary Edema, HTN on Nitroglycerine infusion, Some SOB which improved on Nitroglycerine. No Missed Sessions.    #ESRD:  - Regular HD TTS, [facility], [facility], Dr. Dorman is Nephrologist.  - Dry weight is unstable.  - Last Session was Tuesday full with no complications.      Recommendations:  - Proceed with HD for 3 hours, 2 L UF as ordered.  - Nephrology follow up  - Supportive care as per ICU team.    Keyanna Benito MD  Nephrology Fellow  24 hour Renal Pager - [phone]    Discussed with attending nephrologist

## 2024-08-08 NOTE — PROGRESS NOTES
Physical Therapy                 Therapy Communication Note    Patient Name: Stacey Heath  MRN: [id number]  Today's Date: 8/8/2024     Discipline: Physical Therapy    Missed Visit Reason: Missed Visit Reason: Patient placed on medical hold (SBP >180 parameters; PT will hold.)    Missed Time: Attempt    Comment:

## 2024-08-08 NOTE — HOSPITAL COURSE
Stacey Heath is a 53 y.o. 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 Trialed IV lasix 80 in the ED without significant output, started on nitroglycerin gtt, transferred to the MICU. In the MICU, underwent dialysis with 2L out, nitroglycerin gtt weaned off, transitioned back to home medications. Of note, gabapentin adjusted to daily for renal function and hydralazine increased to 100mg TID (from BID). BP stable, discharged home with PCP follow-up.

## 2024-08-08 NOTE — PROGRESS NOTES
Stacey Heath  Age: 54 y.o.  MRN: [id number]  Date: 2024  Location of service: in community    Program Details  Medicaid Community Clinical Case Management  Status: Enrolled  Effective Dates: 10/17/2023 - present  Responsible Staff: NAREN Davidson      Goals Reviewed:  Problem: Anxiety       Goal: Attempts to manage anxiety with help       Priority: High         Goal: Verbalizes ways to manage anxiety       Priority: High         Goal: Implements measures to reduce anxiety       Priority: High        Problem: Financial Stressors       Goal: Assistance with financial concerns           Summary:  This provider met with patient at the patient's home. This provider listened with empathy as patient recounted the events that took place over the weekend at a family member's . Patient explained that she was able to keep herself pretty calm and was mindful about keeping her distance so that she did not get sick. This provider gave the patient a new blood pressure cuff that the patient has been in need of. This provider gave patient information on additional financial sources that may be available to the family to include Kinship benefits and Transportation to school for the nephew that is residing in the home. This provider assisted patient with processing her disappointment in not being able to work a side job over the weekend to help with the family finances. The patient agreed that was in her best interest to stay home instead and that her partner is very supportive of patient not being able to work outside of the home.                  NAREN Davidson

## 2024-08-09 ENCOUNTER — APPOINTMENT (OUTPATIENT)
Dept: CARE COORDINATION | Facility: CLINIC | Age: 54
End: 2024-08-09
Payer: COMMERCIAL

## 2024-08-09 VITALS
HEART RATE: 85 BPM | RESPIRATION RATE: 19 BRPM | BODY MASS INDEX: 32.35 KG/M2 | HEIGHT: 55 IN | OXYGEN SATURATION: 100 % | WEIGHT: 139.77 LBS | TEMPERATURE: 97.3 F | DIASTOLIC BLOOD PRESSURE: 59 MMHG | SYSTOLIC BLOOD PRESSURE: 148 MMHG

## 2024-08-09 LAB
ALBUMIN SERPL BCP-MCNC: 3.4 G/DL (ref 3.4–5)
ANION GAP SERPL CALC-SCNC: 16 MMOL/L (ref 10–20)
BASOPHILS # BLD AUTO: 0.09 X10*3/UL (ref 0–0.1)
BASOPHILS NFR BLD AUTO: 1.7 %
BUN SERPL-MCNC: 35 MG/DL (ref 6–23)
CALCIUM SERPL-MCNC: 9.1 MG/DL (ref 8.6–10.6)
CHLORIDE SERPL-SCNC: 98 MMOL/L (ref 98–107)
CO2 SERPL-SCNC: 28 MMOL/L (ref 21–32)
CREAT SERPL-MCNC: 6.56 MG/DL (ref 0.5–1.05)
EGFRCR SERPLBLD CKD-EPI 2021: 7 ML/MIN/1.73M*2
EOSINOPHIL # BLD AUTO: 0.47 X10*3/UL (ref 0–0.7)
EOSINOPHIL NFR BLD AUTO: 8.8 %
ERYTHROCYTE [DISTWIDTH] IN BLOOD BY AUTOMATED COUNT: 17.9 % (ref 11.5–14.5)
GLUCOSE BLD MANUAL STRIP-MCNC: 104 MG/DL (ref 74–99)
GLUCOSE BLD MANUAL STRIP-MCNC: 114 MG/DL (ref 74–99)
GLUCOSE BLD MANUAL STRIP-MCNC: 178 MG/DL (ref 74–99)
GLUCOSE SERPL-MCNC: 97 MG/DL (ref 74–99)
HCT VFR BLD AUTO: 30 % (ref 36–46)
HGB BLD-MCNC: 8.8 G/DL (ref 12–16)
IMM GRANULOCYTES # BLD AUTO: 0.03 X10*3/UL (ref 0–0.7)
IMM GRANULOCYTES NFR BLD AUTO: 0.6 % (ref 0–0.9)
LYMPHOCYTES # BLD AUTO: 0.94 X10*3/UL (ref 1.2–4.8)
LYMPHOCYTES NFR BLD AUTO: 17.6 %
MAGNESIUM SERPL-MCNC: 2.2 MG/DL (ref 1.6–2.4)
MCH RBC QN AUTO: 29 PG (ref 26–34)
MCHC RBC AUTO-ENTMCNC: 29.3 G/DL (ref 32–36)
MCV RBC AUTO: 99 FL (ref 80–100)
MONOCYTES # BLD AUTO: 0.51 X10*3/UL (ref 0.1–1)
MONOCYTES NFR BLD AUTO: 9.6 %
NEUTROPHILS # BLD AUTO: 3.3 X10*3/UL (ref 1.2–7.7)
NEUTROPHILS NFR BLD AUTO: 61.7 %
NRBC BLD-RTO: 0 /100 WBCS (ref 0–0)
PHOSPHATE SERPL-MCNC: 6.4 MG/DL (ref 2.5–4.9)
PLATELET # BLD AUTO: 223 X10*3/UL (ref 150–450)
POTASSIUM SERPL-SCNC: 5.2 MMOL/L (ref 3.5–5.3)
RBC # BLD AUTO: 3.03 X10*6/UL (ref 4–5.2)
SODIUM SERPL-SCNC: 137 MMOL/L (ref 136–145)
WBC # BLD AUTO: 5.3 X10*3/UL (ref 4.4–11.3)

## 2024-08-09 PROCEDURE — 36415 COLL VENOUS BLD VENIPUNCTURE: CPT

## 2024-08-09 PROCEDURE — 5A1D70Z PERFORMANCE OF URINARY FILTRATION, INTERMITTENT, LESS THAN 6 HOURS PER DAY: ICD-10-PCS | Performed by: INTERNAL MEDICINE

## 2024-08-09 PROCEDURE — 97165 OT EVAL LOW COMPLEX 30 MIN: CPT | Mod: GO

## 2024-08-09 PROCEDURE — 82947 ASSAY GLUCOSE BLOOD QUANT: CPT

## 2024-08-09 PROCEDURE — 2500000001 HC RX 250 WO HCPCS SELF ADMINISTERED DRUGS (ALT 637 FOR MEDICARE OP)

## 2024-08-09 PROCEDURE — 99239 HOSP IP/OBS DSCHRG MGMT >30: CPT | Performed by: INTERNAL MEDICINE

## 2024-08-09 PROCEDURE — 97162 PT EVAL MOD COMPLEX 30 MIN: CPT | Mod: GP

## 2024-08-09 PROCEDURE — 80069 RENAL FUNCTION PANEL: CPT

## 2024-08-09 PROCEDURE — 5A09357 ASSISTANCE WITH RESPIRATORY VENTILATION, LESS THAN 24 CONSECUTIVE HOURS, CONTINUOUS POSITIVE AIRWAY PRESSURE: ICD-10-PCS | Performed by: INTERNAL MEDICINE

## 2024-08-09 PROCEDURE — 83735 ASSAY OF MAGNESIUM: CPT

## 2024-08-09 PROCEDURE — 2500000002 HC RX 250 W HCPCS SELF ADMINISTERED DRUGS (ALT 637 FOR MEDICARE OP, ALT 636 FOR OP/ED)

## 2024-08-09 PROCEDURE — 85025 COMPLETE CBC W/AUTO DIFF WBC: CPT

## 2024-08-09 PROCEDURE — 94640 AIRWAY INHALATION TREATMENT: CPT

## 2024-08-09 RX ORDER — NIFEDIPINE 90 MG/1
90 TABLET, EXTENDED RELEASE ORAL
Qty: 60 TABLET | Refills: 2 | Status: SHIPPED | OUTPATIENT
Start: 2024-08-09

## 2024-08-09 RX ORDER — HYDRALAZINE HYDROCHLORIDE 100 MG/1
100 TABLET, FILM COATED ORAL 3 TIMES DAILY
Status: DISCONTINUED | OUTPATIENT
Start: 2024-08-09 | End: 2024-08-09 | Stop reason: HOSPADM

## 2024-08-09 RX ORDER — HYDRALAZINE HYDROCHLORIDE 100 MG/1
100 TABLET, FILM COATED ORAL 3 TIMES DAILY
Refills: 2
Start: 2024-08-09

## 2024-08-09 RX ORDER — ISOSORBIDE MONONITRATE 30 MG/1
120 TABLET, EXTENDED RELEASE ORAL DAILY
Status: DISCONTINUED | OUTPATIENT
Start: 2024-08-09 | End: 2024-08-09 | Stop reason: HOSPADM

## 2024-08-09 RX ORDER — GABAPENTIN 300 MG/1
300 CAPSULE ORAL NIGHTLY
Start: 2024-08-09

## 2024-08-09 RX ORDER — ISOSORBIDE MONONITRATE 120 MG/1
120 TABLET, EXTENDED RELEASE ORAL DAILY
Qty: 60 TABLET | Refills: 2 | Status: SHIPPED | OUTPATIENT
Start: 2024-08-09

## 2024-08-09 ASSESSMENT — COGNITIVE AND FUNCTIONAL STATUS - GENERAL
MOVING TO AND FROM BED TO CHAIR: A LITTLE
CLIMB 3 TO 5 STEPS WITH RAILING: A LITTLE
WALKING IN HOSPITAL ROOM: A LITTLE
DAILY ACTIVITIY SCORE: 24
MOBILITY SCORE: 20
STANDING UP FROM CHAIR USING ARMS: A LITTLE

## 2024-08-09 ASSESSMENT — PAIN - FUNCTIONAL ASSESSMENT
PAIN_FUNCTIONAL_ASSESSMENT: 0-10

## 2024-08-09 ASSESSMENT — ACTIVITIES OF DAILY LIVING (ADL)
BATHING_ASSISTANCE: INDEPENDENT
ADL_ASSISTANCE: INDEPENDENT

## 2024-08-09 ASSESSMENT — PAIN SCALES - GENERAL
PAINLEVEL_OUTOF10: 0 - NO PAIN

## 2024-08-09 NOTE — PROGRESS NOTES
SOCIAL WORK NOTE   Per nursing patient is discharging and needs Lyft home. Phone number and address confirmed. Patient attends [facility] for HD, plan to attend Saturday. Confirmed with facility chair is active. Clinicals to be faxed when complete. SW to request lyft when discharge is complete. Social work to follow.  MADISON Torres, LISW-S ([id number])

## 2024-08-09 NOTE — DISCHARGE INSTRUCTIONS
Dear Ms. Heath,     You were recently admitted to the Medical Intensive Care Unit at [facility] for fluid overload that was causing you to have shortness of breath and headache. You had imaging that showed fluid in your lungs. You were treated with dialysis and blood pressure medications. You improved following treatment. You were discharged from the Medical Intensive Care Unit on 8/9.     You will get a call to schedule a follow up appointment with your primary care provider, please plan to attend this and all other previously scheduled appointments. We changed how you take your Gabapentin medication, please just take 1 pill a day.     We wish you the best!   MICU Blue Team

## 2024-08-09 NOTE — PROGRESS NOTES
Pharmacy Medication History Review    Stacey Heath is a 54 y.o. female admitted for Hypervolemia, unspecified hypervolemia type. Pharmacy reviewed the patient's [id number] medications and allergies for accuracy.    Medications ADDED:  N/A  Medications CHANGED:  N/A  Medications REMOVED:   N/A     The list below reflects the updated PTA list. Comments regarding how patient may be taking medications differently can be found in the Admit Orders Activity  Prior to Admission Medications   Prescriptions Last Dose Informant Patient Reported?   B complex-vitamin C-folic acid (Nephrocaps) 1 mg capsule Past Week Self No   Sig: Take 1 capsule by mouth once daily.   NIFEdipine ER (Adalat CC) 90 mg 24 hr tablet  Self, Other No   Sig: Take 1 tablet (90 mg) by mouth once daily in the morning. Take before meals. Do not crush, chew, or split.   acetaminophen (Tylenol) 325 mg tablet Past Week Self No   Sig: Take 2 tablets (650 mg) by mouth every 4 hours if needed for mild pain (1 - 3) or moderate pain (4 - 6).   albuterol 1.25 mg/3 mL nebulizer solution Past Week Self No   Sig: Take 3 mL (1.25 mg) by nebulization every 6 hours if needed for wheezing.   albuterol sulfate (Proair Digihaler) 90 mcg/actuation aero powdr breath act w/sensor inhaler Past Week Self Yes   Sig: Inhale 2 puffs 2 times a day as needed for wheezing or shortness of breath.   apixaban (Eliquis) 5 mg tablet   No   Sig: Take 1 tablet (5 mg) by mouth 2 times a day.   aspirin 81 mg EC tablet Past Week Self Yes   Sig: Take 1 tablet (81 mg) by mouth once daily.   atorvastatin (Lipitor) 80 mg tablet Past Week Self No   Sig: Take 1 tablet (80 mg) by mouth once daily at bedtime.   benzonatate (Tessalon) 100 mg capsule Past Week Self No   Sig: Take 1 capsule (100 mg) by mouth 3 times a day as needed for cough. Do not crush or chew.   calcium acetate (Phoslo) 667 mg capsule Past Week Self No   Sig: Take 2 capsules (1,334 mg) by mouth 3 times daily (morning, midday,  late afternoon).   carvedilol (Coreg) 25 mg tablet Past Week Self No   Sig: Take 2 tablets (50 mg) by mouth 2 times a day.   cloNIDine (Catapres) 0.2 mg tablet Past Week Self No   Sig: Take 1 tablet (0.2 mg) by mouth 3 times a day.   diphenhydrAMINE (BENADryl) 25 mg capsule Past Week Self Yes   Sig: Take 1 capsule (25 mg) by mouth as needed at bedtime for itching or sleep.   epoetin joceline (Epogen,Procrit) 10,000 unit/mL injection Past Week Self No   Sig: Inject 1 mL (10,000 Units) under the skin 3 times a week.   fluticasone (Flonase) 50 mcg/actuation nasal spray Past Week Self No   Sig: Administer 2 sprays into each nostril once daily. Shake gently. Before first use, prime pump. After use, clean tip and replace cap.   fluticasone furoate-vilanteroL (Breo Ellipta) 100-25 mcg/dose inhaler Past Week Self No   Sig: Inhale 1 puff once daily.   gabapentin (Neurontin) 300 mg capsule  Self Yes   Sig: Take 1 capsule (300 mg) by mouth 3 times a day.   hydrALAZINE (Apresoline) 100 mg tablet   No   Sig: Take 1 tablet (100 mg) by mouth 2 times a day.   isosorbide mononitrate ER (Imdur) 120 mg 24 hr tablet  Self, Other No   Sig: Take 1 tablet (120 mg) by mouth once daily. Do not crush or chew.   polyethylene glycol (Glycolax, Miralax) 17 gram/dose powder Past Week Self No   Sig: Take 17 g (1 scoop dissolved in liquid) by mouth once daily. Do not start before July 26, 2024.   sodium chloride (Ocean) 0.65 % nasal spray Past Week Self No   Sig: Administer 1 spray into each nostril 4 times a day as needed for congestion.   torsemide (Demadex) 20 mg tablet Past Week Self No   Sig: Take 2 tablets once daily on non-dialysis days only. Do not take on dialysis days   valsartan (Diovan) 320 mg tablet Past Week Self No   Sig: Take 1 tablet (320 mg) by mouth once daily.      Facility-Administered Medications: None        The list below reflects the updated allergy list. Please review each documented allergy for additional clarification and  justification.  Allergies  Reviewed by Sherie Rosa RN on 8/8/2024        Severity Reactions Comments    Iodine High Hives, Itching, Unknown     Bioflavonoids Not Specified Swelling     Codeine Not Specified Itching, Hives, Unknown Tolerates percocet   Tolerates percocet Tolerates percocet    Flowers Not Specified Itching     Hydromorphone Not Specified Itching     Shellfish Containing Products Not Specified Swelling SEAFOOD            Patient declines M2B at discharge.     Sources used to complete the med history include out patient fill history, OARRS, and patient interview who was a great historian along with med rec completed 7/24/24 Shannon Parekh.       Below are additional concerns with the patient's PTA list.      Polo [facility]  Transitions of Care Clinical Pharmacist  Please reach out via Epic Chat for questions, if no response call  [phone] or [facility]

## 2024-08-09 NOTE — CARE PLAN
Problem: Pain  Goal: Takes deep breaths with improved pain control throughout the shift  Outcome: Progressing  Goal: Turns in bed with improved pain control throughout the shift  Outcome: Progressing  Goal: Walks with improved pain control throughout the shift  Outcome: Progressing  Goal: Performs ADL's with improved pain control throughout shift  Outcome: Progressing  Goal: Free from acute confusion related to pain meds throughout the shift  Outcome: Progressing     Problem: Fall/Injury  Goal: Use assistive devices by end of the shift  Outcome: Progressing  Goal: Pace activities to prevent fatigue by end of the shift  Outcome: Progressing     Problem: Skin  Goal: Decreased wound size/increased tissue granulation at next dressing change  Outcome: Progressing  Goal: Participates in plan/prevention/treatment measures  Outcome: Progressing  Goal: Prevent/manage excess moisture  Outcome: Progressing  Goal: Prevent/minimize sheer/friction injuries  Outcome: Progressing  Goal: Promote/optimize nutrition  Outcome: Progressing  Goal: Promote skin healing  Outcome: Progressing

## 2024-08-09 NOTE — PROGRESS NOTES
Physical Therapy    Physical Therapy Evaluation    Patient Name: Stacey Heath  MRN: [id number]  Today's Date: 8/9/2024   Time Calculation  Start Time: 0951  Stop Time: 1006  Time Calculation (min): 15 min    Assessment/Plan   PT Assessment  Evaluation/Treatment Tolerance: Patient tolerated treatment well  End of Session Communication: Bedside nurse  Assessment Comment: patient admitted with HTN emergency, AHRF, s/p HD session on 8/8. Patient able to complete functional mobility tasks without assistance; patient able to ambulate a community distance without using DME or without observed gait deviations. Patient appears at functional baseline. PT will D/C orders. PT encouraged patient to keep ambulating with RN staff throughout hospital stay.  End of Session Patient Position: Up in chair, Alarm off, not on at start of session  IP OR SWING BED PT PLAN  Inpatient or Swing Bed: Inpatient  PT Plan  PT Plan: PT Eval only  PT Eval Only Reason: No acute PT needs identified  PT Frequency: PT eval only  PT Discharge Recommendations: No further acute PT  PT Recommended Transfer Status:  (supervision with ambulation)  PT - OK to Discharge: Yes      Subjective   General Visit Information:  General  Reason for Referral: AHRF requiring continuous BiPAP i/s/o HTN emergency, requiring nitro gtt, s/p HD on 8/8.  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  Family/Caregiver Present: No  Prior to Session Communication: Bedside nurse  Patient Position Received: Bed, 3 rail up, Alarm off, not on at start of session  General Comment: agreeable to participate in therapy; pleasant. tele  Home Living:  Home Living  Type of Home: House  Lives With: Other (Comment) (fiance (works), + nephew (<19 y/o))  Home Adaptive Equipment: Cane, Walker rolling or  standard  Home Layout: Multi-level, Laundry in basement, Bed/bath upstairs, Other (Comment)  Alternate Level Stairs-Rails:  (x1)  Alternate Level Stairs-Number of Steps: 10-12 to 2nd floor, and to basement  Home Access: Stairs to enter with rails  Entrance Stairs-Rails: Both  Entrance Stairs-Number of Steps: 5  Bathroom Shower/Tub: Tub/shower unit  Bathroom Toilet: Standard  Bathroom Equipment: None  Prior Level of Function:  Prior Function Per Pt/Caregiver Report  Level of [place]: Independent with ADLs and functional transfers, Other (Comment) (reports occasional A with ADLs as needed at times, reports indep iADLs most of the time as well)  Receives Help From: Family  Ambulatory Assistance: Independent (indep household ambulation; reports 2ww vs scooter in stores for community distances)  Leisure: reports enjoying watching [employer]  Hand Dominance: Left  Prior Function Comments: (-) drive  Precautions:  Precautions  Hearing/Visual Limitations: hearing appears WFL; reports wearing glasses (not currently on patient)  Medical Precautions: Fall precautions  Precautions Comment: SBP goal <180 mmHg  Vital Signs:  Vital Signs  Heart Rate:  (pre: 83 post: 83)  Resp:  (pre: 21 post: 22)  SpO2:  (pre: 100% post: 100%)  BP:  (pre: 155/67 post: 148/59)  BP Method: Automatic    Objective   Pain:  Pain Assessment  Pain Assessment: 0-10  0-10 (Numeric) Pain Score: 0 - No pain  Cognition:  Cognition  Overall Cognitive Status: Within Functional Limits  Arousal/Alertness: Appropriate responses to stimuli  Orientation Level:  (AxO x3)  Following Commands: Follows all commands and directions without difficulty    General Assessments:      Activity Tolerance  Early Mobility/Exercise Safety Screen: Proceed with mobilization - No exclusion criteria met    Sensation  Sensation Comment: denied numbness/tingling in BUEs/BLEs    Strength  Strength Comments: BUE: appears grossly WFL, BLE: appears grossly WFL  Postural  Control  Postural Control: Within Functional Limits    Static Sitting Balance  Static Sitting-Balance Support: Feet supported, No upper extremity supported  Static Sitting-Level of Assistance: Independent  Dynamic Sitting Balance  Dynamic Sitting-Balance Support: Feet supported, No upper extremity supported  Dynamic Sitting-Comments: distant supervision    Static Standing Balance  Static Standing-Balance Support: No upper extremity supported  Static Standing-Level of Assistance: Distant supervision  Dynamic Standing Balance  Dynamic Standing-Balance Support: No upper extremity supported  Dynamic Standing-Comments: distant supervision, no acute LOB or postural sway  Functional Assessments:  Bed Mobility  Bed Mobility: Yes  Bed Mobility 1  Bed Mobility 1: Supine to sitting  Level of Assistance 1: Distant supervision  Bed Mobility Comments 1: x1 with HOB elevated    Transfers  Transfer: Yes  Transfer 1  Transfer From 1: Sit to, Stand to  Transfer to 1: Sit, Stand  Technique 1: Sit to stand, Stand to sit  Transfer Level of Assistance 1: Distant supervision  Trials/Comments 1: no acute LOB    Ambulation/Gait Training  Ambulation/Gait Training Performed: Yes  Ambulation/Gait Training 1  Surface 1: Level tile  Device 1: No device  Assistance 1: Distant supervision  Quality of Gait 1:  (no acute LOB, no observed gait deviations, reciprocal gait pattern)  Comments/Distance (ft) 1: x120 ft in hallway  Extremity/Trunk Assessments:  RUE   RUE :  (AROM WFL)  LUE   LUE:  (AROM WFL)  RLE   RLE :  (AROM WFL)  LLE   LLE :  (AROM WFL)  Outcome Measures:  [facility] Basic Mobility  Turning from your back to your side while in a flat bed without using bedrails: None  Moving from lying on your back to sitting on the side of a flat bed without using bedrails: None  Moving to and from bed to chair (including a wheelchair): A little  Standing up from a chair using your arms (e.g. wheelchair or bedside chair): A little  To walk in hospital room:  A little  Climbing 3-5 steps with railing: A little  Basic Mobility - Total Score: 20    FSS-ICU  Ambulation: Walks >/ or equal to 50 feet with any assistance x1  Rolling: Complete independence  Sitting: Supervision or set-up only  Transfer Sit-to-Stand: Supervision or set-up only  Transfer Supine-to-Sit: Supervision or set-up only  Total Score: 24    Early Mobility/Exercise Safety Screen: Proceed with mobilization - No exclusion criteria met  ICU Mobility Scale: Walking with assistance of 1 person [8]      Education Documentation  Mobility Training, taught by Jacqueline Stallings PT at 8/9/2024 11:45 AM.  Learner: Patient  Readiness: Acceptance  Method: Explanation  Response: Verbalizes Understanding  Comment: encouraged patient to remain ambulatory with RN staff throughout current hospital stay    Jacqueline Stallings PT, DPT

## 2024-08-09 NOTE — PROGRESS NOTES
ICU to [facility] Transfer Summary     I:  ICU Admission Reason & Brief ICU Course:    Stacey Heath is a 53 y.o. 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 The patient was given 80 mg lasix in the ED without significant output and started on nitroglycerine gtt. She was transferred to the MICU on NC, but was weaned to RA upon arrival. In the MICU she remained on the nitroglycerine drip and was restarted on her home medications of amlodipine, Coreg 50 BID,  Clonidine 0.2 TID (given 0.1), isosorbide 120, nifedipine 90, valsartan 320. Hemodialysis was started ~2:30 PM on 8/8 with with approximately 2L removed. Patient was taken off nitroglycerine drip at this time as well. She remained hemodynamically stable with improvement of her blood pressure after restarting her home medications. Overnight she was on 2L O2 by NC with SpO2 >96% and was on RA this morning after waking. She is suitable for transfer to the floor.     C: Code Status/DPOA Info/Goals of Care/ACP Note    Full Code  DPOA/Contact Number: Diya Jang (child) [phone]; Hai Pinedo (significant other) [phone]; Rohini Piña (child) [phone]     U: Unprescribing & Pertinent High-Risk Medications    Changes to home meds: Na     Anticoagulation: Eliquis 5 mg BID (hx of DVT)    Antibiotics:   [x] N/A - no current planned antimicrobioals  []  X indication X start date X planned duration X    P: Pending Tests at the Time of Transfer   Na      A: Active consultants, including Rehab:   []  Subspecialty Consultants: nephrology   [x]  PT  [x]  OT  []  SLP  []  Wound Care    U: Uncertainty Measure/Diagnostic Pause:    Working diagnosis at the time of transfer flash pulmonary edema and hypertensive emergency 2/2  volume overload in setting of ESRD, though ddx includes HF exacerbation     Diagnosis Degree of Certainty: 1. High degree of certainty about the clinical diagnosis.     CARDIOVASCULAR:  Dx: Hypertensive emergency 2/2 volume overload   Home medications: Amlodipine, Coreg 50 BID,  Clonidine 0.2 TID (given 0.1), isosorbide 120, nifedipine 90, valsartan 320   Noted new holosystolic murmur (hx of mitral and tricuspid regurg per previous TTE)  Management:  Hemodialysis per nephrology (MICU) for volume overload; torsemide on non-dialysis days   Restarted following home medications: carvedilol, clonidine, hydralazine, nifedipine, valsartan,   Will require outpatient optimization     Dx: Acute hypoxic respiratory failure 2/2 flash pulmonary edema with volume overload  Stable on room air   Management:  Plan for dialysis per nephrology      Dx: COPD  7/2022: FEV1/FVC 75.22, mild obstructive pattern  Follows with pulmonology  Management:  Goal O2 sat >94%, titrate O2 as needed  Continue home albuterol PRN and breo ellipta      RENAL/GENITOURINARY:  Dx: ESRD   HD T/Th/Sun at [facility], follows with Dr. Garcia  Hypertensive emergency, flash pulmonary edema   Access RUE AVF  Dry weight: 54kg from record  S/p lasix 80 mg in ED  Management:  Hemodialysis while in MICU   Fluid restriction, renal diet         E: Exam, including Lines/Drains/Airways & Data Review:   Physical Exam  Constitutional:       General: She is not in acute distress.     Appearance: Normal appearance. She is not ill-appearing.   HENT:      Head: Normocephalic and atraumatic.      Nose: Nose normal.      Mouth/Throat:      Mouth: Mucous membranes are dry.   Eyes:      General: No scleral icterus.     Extraocular Movements: Extraocular movements intact.      Pupils: Pupils are equal, round, and reactive to light.   Cardiovascular:      Rate and Rhythm: Normal rate and regular rhythm.      Pulses: Normal pulses.      Heart sounds: Murmur (holosystolic) heard.    Pulmonary:      Effort present.      Breath sounds: Normal breath sounds. No wheezing.   Abdominal:      General: Abdomen is flat. Bowel sounds are normal. There is no distension.      Palpations: Abdomen is soft.      Tenderness: There is no abdominal tenderness.   Musculoskeletal:         General: Normal range of motion.      Cervical back: Normal range of motion.      Right lower leg: Edema not present (improved).      Left lower leg: Edema not present (improved).   Skin:     General: Skin is warm and dry.      Coloration: Skin is not jaundiced.      Findings: No bruising.   Neurological:      General: No focal deficit present.      Mental Status: She is alert and oriented to person, place, and time. Mental status is at baseline.   Psychiatric:         Mood and Affect: Mood normal.         Behavior: Behavior normal.         Thought Content: Thought content normal.         Judgment: Judgment normal.   Difficult airway? N/A  Lines/drains assessed for removal? No    Within 30 minutes of the patient physically leaving the floor, a Floor Readiness Note needs to be placed with updated vitals.

## 2024-08-09 NOTE — DISCHARGE SUMMARY
Discharge Diagnosis  Hypervolemia in setting of ESRD    Issues Requiring Follow-Up  - BP medication optimization  - Dialysis optimization given repeat admissions for volume overload  - New holosystolic murmur follow up     Discharge Meds     Your medication list        CHANGE how you take these medications        Instructions Last Dose Given Next Dose Due   gabapentin 300 mg capsule  Commonly known as: Neurontin  What changed: when to take this      Take 1 capsule (300 mg) by mouth once daily at bedtime.       hydrALAZINE 100 mg tablet  Commonly known as: Apresoline  What changed: when to take this      Take 1 tablet (100 mg) by mouth 3 times a day.              CONTINUE taking these medications        Instructions Last Dose Given Next Dose Due   acetaminophen 325 mg tablet  Commonly known as: Tylenol      Take 2 tablets (650 mg) by mouth every 4 hours if needed for mild pain (1 - 3) or moderate pain (4 - 6).       albuterol sulfate 90 mcg/actuation aero powdr breath act w/sensor inhaler  Commonly known as: Proair Digihaler           albuterol 1.25 mg/3 mL nebulizer solution      Take 3 mL (1.25 mg) by nebulization every 6 hours if needed for wheezing.       apixaban 5 mg tablet  Commonly known as: Eliquis      Take 1 tablet (5 mg) by mouth 2 times a day.       aspirin 81 mg EC tablet           atorvastatin 80 mg tablet  Commonly known as: Lipitor      Take 1 tablet (80 mg) by mouth once daily at bedtime.       benzonatate 100 mg capsule  Commonly known as: Tessalon      Take 1 capsule (100 mg) by mouth 3 times a day as needed for cough. Do not crush or chew.       calcium acetate 667 mg capsule  Commonly known as: Phoslo      Take 2 capsules (1,334 mg) by mouth 3 times daily (morning, midday, late afternoon).       carvedilol 25 mg tablet  Commonly known as: Coreg      Take 2 tablets (50 mg) by mouth 2 times a day.       cloNIDine 0.2 mg tablet  Commonly known as: Catapres      Take 1 tablet (0.2 mg) by mouth 3  times a day.       Deep Sea Nasal 0.65 % nasal spray  Generic drug: sodium chloride      Administer 1 spray into each nostril 4 times a day as needed for congestion.       diphenhydrAMINE 25 mg capsule  Commonly known as: BENADryl           epoetin joceline 10,000 unit/mL injection  Commonly known as: Epogen,Procrit      Inject 1 mL (10,000 Units) under the skin 3 times a week.       fluticasone 50 mcg/actuation nasal spray  Commonly known as: Flonase      Administer 2 sprays into each nostril once daily. Shake gently. Before first use, prime pump. After use, clean tip and replace cap.       fluticasone furoate-vilanteroL 100-25 mcg/dose inhaler  Commonly known as: Breo Ellipta      Inhale 1 puff once daily.       isosorbide mononitrate  mg 24 hr tablet  Commonly known as: Imdur      Take 1 tablet (120 mg) by mouth once daily. Do not crush or chew.       NIFEdipine ER 90 mg 24 hr tablet  Commonly known as: Adalat CC      Take 1 tablet (90 mg) by mouth once daily in the morning. Take before meals. Do not crush, chew, or split.       polyethylene glycol 17 gram/dose powder  Commonly known as: Glycolax, Miralax      Take 17 g (1 scoop dissolved in liquid) by mouth once daily. Do not start before July 26, 2024.       Renal Caps 1 mg capsule  Generic drug: B complex-vitamin C-folic acid      Take 1 capsule by mouth once daily.       torsemide 20 mg tablet  Commonly known as: Demadex      Take 2 tablets once daily on non-dialysis days only. Do not take on dialysis days       valsartan 320 mg tablet  Commonly known as: Diovan      Take 1 tablet (320 mg) by mouth once daily.                 Where to Get Your Medications        These medications were sent to [facility]  [address], [address], [facility] [address]      Hours: 8AM to 6PM Mon-Fri, 8AM to 4PM Sat, 9AM to 1PM Sun Phone: [phone]   isosorbide mononitrate  mg 24 hr tablet  NIFEdipine ER 90 mg 24 hr tablet       Information about  where to get these medications is not yet available    Ask your nurse or doctor about these medications  apixaban 5 mg tablet  gabapentin 300 mg capsule  hydrALAZINE 100 mg tablet         Test Results Pending At Discharge  Pending Labs       No current pending labs.          Hospital Course  Stacey eHath is a 53 y.o. 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 Trialed IV lasix 80 in the ED without significant output, started on nitroglycerin gtt, transferred to the MICU. In the MICU, underwent dialysis with 2L out, nitroglycerin gtt weaned off, transitioned back to home medications. Of note, gabapentin adjusted to daily for renal function and hydralazine increased to 100mg TID (from BID). BP stable, discharged home with PCP follow-up.    Pertinent Physical Exam At Time of Discharge  Physical Exam  Constitutional:       General: She is not in acute distress.     Appearance: Normal appearance. She is not ill-appearing.   HENT:      Head: Normocephalic and atraumatic.      Nose: Nose normal.      Mouth/Throat:      Mouth: Mucous membranes are dry.   Eyes:      General: No scleral icterus.     Extraocular Movements: Extraocular movements intact.      Pupils: Pupils are equal, round, and reactive to light.   Cardiovascular:      Rate and Rhythm: Normal rate and regular rhythm.      Pulses: Normal pulses.      Heart sounds: Murmur (holosystolic) heard.   Pulmonary:      Effort present.      Breath sounds: Normal breath sounds. No wheezing.   Abdominal:      General: Abdomen is flat. Bowel sounds are normal. There is no distension.      Palpations: Abdomen is soft.      Tenderness: There is no abdominal tenderness.   Musculoskeletal:         General: Normal range of motion.      Cervical back: Normal range of  motion.      Right lower leg: Edema resolved present.      Left lower leg: Edema resolved present.   Skin:     General: Skin is warm and dry.      Coloration: Skin is not jaundiced.      Findings: No bruising.   Neurological:      General: No focal deficit present.      Mental Status: She is alert and oriented to person, place, and time. Mental status is at baseline.   Psychiatric:         Mood and Affect: Mood normal.         Behavior: Behavior normal.         Thought Content: Thought content normal.         Judgment: Judgment normal.     Outpatient Follow-Up  Future Appointments   Date Time Provider Department [place]   8/9/2024  2:30 PM Bess Hurst RD UHACOMgmt [facility]   8/11/2024  4:30 PM [facility] [id number] None   8/14/2024  2:40 PM LILLY Leahy-CNP [id number] [facility]   8/19/2024  2:30 PM [facility] BOL6F PFT RM 1 [id number] [facility]   8/19/2024  3:30 PM [facility] BOL6F PFT WALKWAY RM [id number] [facility]   8/19/2024  4:00 PM [facility] BOL6F PFT WALKWAY RM [id number] [facility]   8/28/2024 11:20 AM Judy Alcaraz MD [id number] [facility]   10/4/2024  3:15 PM [facility] CAIC CT 1 CMCCAICCT [facility]       Patient seen and discussed with team prior to discharge.   SHEILA WHITAKER, MS4

## 2024-08-09 NOTE — SIGNIFICANT EVENT
Floor Readiness Note       I, personally, evaluated Stacey Heath prior to transfer to the floor, including reviewing all current laboratory and imaging studies. The patient remains appropriate for transfer to the floor. Bedside nurse and respiratory therapy are also in agreement of patient's readiness for the floor.     Brief summary:  Stacey Heath is a 54 y.o. female who was admitted to the MICU on 8/8/2024 for acute hypoxic respiratory failure requiring continuous BiPAP in the setting of hypertensive emergency. They have been treated with dialysis on 8/8/2024 with return to baseline.     Updated focused Physical Exam:    Neck:      Comments: No JVD   Cardiovascular:      Rate and Rhythm: Normal rate and regular rhythm.      Pulses: Normal pulses.      Heart sounds: Normal heart sounds.      Comments: No murmur  Pulmonary:      Effort: Pulmonary effort is normal.      Breath sounds: Rales present.      Comments: Diminished lung sounds on both lower lobes  Abdominal:      Palpations: Abdomen is soft.   Musculoskeletal:      Comments: No LE edema      Current Vital Signs:  Heart Rate: 78 (08/09/24 0400 : Janet Molina RN)  BP: 178/61 (08/09/24 0400 : Janet Molina, RN)  Temp: 35.9 °C (96.6 °F) (08/08/24 2300 : Yasmeen Crabtree)  Resp: 16 (08/09/24 0400 : Janet Molina, PONCHO)  SpO2: 99 % (08/09/24 0400 : Janet Molina, PONCHO)    Relevant updates since rounds:    Re-introduced home blood pressure regimen, weaned off oxygen     Accepting team, Hospitalist D, received verbal sign out and the Provider Care team/Attending has been updated. Bedside nurse will now call accepting nurse for report and patient will be transferred to [place] [id number].    Ab Reed MD

## 2024-08-09 NOTE — PROGRESS NOTES
Occupational Therapy    Evaluation    Patient Name: Stacey Heath  MRN: [id number]  Today's Date: 8/9/2024  Room: [address] [place] [address]  Time Calculation  Start Time: 1004  Stop Time: 1019  Time Calculation (min): 15 min    Assessment  IP OT Assessment  OT Assessment: Stacey is a 53yo female presenting at functional baseline. Pt does not present with needs for skilled OT intervention. Will complete OT orders  Prognosis: Good  Barriers to Discharge: None  Evaluation/Treatment Tolerance: Patient tolerated treatment well  Medical Staff Made Aware: Yes  End of Session Communication: Bedside nurse  End of Session Patient Position: Up in chair, Alarm off, not on at start of session  Plan:  Inpatient Plan  No Skilled OT: No acute OT goals identified  OT Frequency: OT eval only  OT Recommended Transfer Status: Independent  OT - OK to Discharge: Yes  OT Assessment  Prognosis: Good  Barriers to Discharge: None  Evaluation/Treatment Tolerance: Patient tolerated treatment well  Medical Staff Made Aware: Yes  Strengths: Ability to acquire knowledge, Capable of completing ADLs semi/independent, Insight into problems, Living arrangement secure, Premorbid level of function, Support of Caregivers, Support and attitude of living partners    Subjective   Current Problem:  1. Hypervolemia, unspecified hypervolemia type  DISCONTINUED: oxygen (O2) gas therapy      2. ESRD on dialysis (Multi)        3. Neuropathy  gabapentin (Neurontin) 300 mg capsule      4. Essential hypertension  hydrALAZINE (Apresoline) 100 mg tablet      5. Acute deep vein thrombosis (DVT) of left upper extremity (Multi)  apixaban (Eliquis) 5 mg tablet      6. Hypertensive urgency  NIFEdipine ER (Adalat CC) 90 mg 24 hr tablet    isosorbide mononitrate ER (Imdur) 120 mg 24 hr tablet        General:  Reason for Referral: admitted to the MICU on 8/8/2024 for acute hypoxic respiratory failure requiring continuous BiPAP in the setting of hypertensive emergency requiring nitro gtt, s/p  HD on 8/8.  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  Prior to Session Communication: Bedside nurse  Patient Position Received: Bed, 3 rail up, Alarm off, not on at start of session  Family/Caregiver Present: No  General Comment: Pt sitting up in chair upon entry to room. Pt pleasant and willing to work with therapy. Tele   Precautions:  Hearing/Visual Limitations: WFL  Medical Precautions: Fall precautions  Precautions Comment: SBP goal <180 mmHg  Vital Signs:  Heart Rate: 85 (82 post)  Resp: 19 (19 post)  SpO2: 100 % (100 post)  BP: 148/59  MAP (mmHg): 87  Pain:  Pain Assessment  Pain Assessment: 0-10  0-10 (Numeric) Pain Score: 0 - No pain  Lines/Tubes/Drains:         Objective   Cognition:  Overall Cognitive Status: Within Functional Limits  Arousal/Alertness: Appropriate responses to stimuli  Orientation Level: Oriented X4  Following Commands: Follows all commands and directions without difficulty  Insight: Within function limits  Impulsive: Within functional limits  Processing Speed: Within funtional limits           Home Living:  Type of Home: House  Lives With: Other (Comment) (fiance (works), + nephew (<19 y/o))  Home Adaptive Equipment: Cane, Walker rolling or standard  Home Layout: Multi-level, Laundry in basement, Bed/bath upstairs, Other (Comment)  Alternate Level Stairs-Rails: Both  Alternate Level Stairs-Number of Steps: 10-12 to 2nd floor, and to basement  Entrance Stairs-Rails: Both  Entrance Stairs-Number of Steps: 5  Bathroom Shower/Tub: Tub/shower unit  Bathroom Toilet: Standard  Bathroom Equipment: None   Prior Function:  Level of [place]: Independent with ADLs and functional transfers, Independent with homemaking with ambulation  Receives Help From: Family  ADL Assistance: Independent  Homemaking  Assistance: Independent  Ambulatory Assistance: Independent (indep household ambulation; reports 2ww vs scooter in stores for community distances)  Vocational: On disability  Leisure: reports enjoying watching [employer]  Hand Dominance: Left  Prior Function Comments: -drive, denies falls  IADL History:     ADL:  Eating Assistance: Independent (anticipated)  Grooming Assistance: Independent (anticipated)  Bathing Assistance: Independent (anticipated)  UE Dressing Assistance: Independent (anticipated)  LE Dressing Assistance: Independent  Toileting Assistance with Device: Independent (anticipated)  ADL Comments: demonstrates donning/doffing socks with I  Activity Tolerance:  Endurance: Decreased tolerance for upright activites  Early Mobility/Exercise Safety Screen: Proceed with mobilization - No exclusion criteria met  Balance:     Bed Mobility/Transfers: Bed Mobility  Bed Mobility: Yes   and Transfers  Transfer: Yes  Transfer 1  Transfer From 1: Sit to, Stand to  Transfer to 1: Sit, Stand  Technique 1: Sit to stand, Stand to sit  Transfer Level of Assistance 1: Distant supervision    Sensation:  Light Touch: No apparent deficits  Strength:    Coordination:  Movements are Fluid and Coordinated: Yes   Hand Function:  Hand Function  Gross Grasp: Functional  Extremities: RUE   RUE : Within Functional Limits (AROM, reports shoulder surgery last year, WFL), LUE   LUE: Within Functional Limits (AROM, reports shoulder surgery last year, WFL), RLE   RLE : Within Functional Limits (AROM), and LLE   LLE :  (AROM)    Outcome Measures: [facility] Daily Activity  Putting on and taking off regular lower body clothing: None  Bathing (including washing, rinsing, drying): None  Putting on and taking off regular upper body clothing: None  Toileting, which includes using toilet, bedpan or urinal: None  Taking care of personal grooming such as brushing teeth: None  Eating Meals: None  Daily Activity - Total Score: 24    Confusion  Assessment Method-ICU (CAM-ICU)  Feature 1: Acute Onset or Fluctuating Course: Negative  Feature 2: Inattention: Negative  Feature 4: Disorganized Thinking: Negative  Overall CAM-ICU: Negative   ICU Mobility Screen  Early Mobility/Exercise Safety Screen: Proceed with mobilization - No exclusion criteria met  ICU Mobility Scale: Walking with assistance of 1 person,          Education Documentation  ADL Training, taught by Britta Zheng OT at 8/9/2024 12:40 PM.  Learner: Patient  Readiness: Acceptance  Method: Explanation  Response: Verbalizes Understanding    Education Comments  No comments found.        08/09/24 at 12:42 PM   Britta Zheng OT   Rehab Office: [phone]

## 2024-08-10 ENCOUNTER — HOSPITAL ENCOUNTER (INPATIENT)
Facility: HOSPITAL | Age: 54
LOS: 1 days | Discharge: HOME | DRG: 189 | End: 2024-08-12
Attending: EMERGENCY MEDICINE | Admitting: STUDENT IN AN ORGANIZED HEALTH CARE EDUCATION/TRAINING PROGRAM
Payer: COMMERCIAL

## 2024-08-10 ENCOUNTER — NURSE TRIAGE (OUTPATIENT)
Dept: HOME HEALTH SERVICES | Age: 54
End: 2024-08-10
Payer: COMMERCIAL

## 2024-08-10 ENCOUNTER — CLINICAL SUPPORT (OUTPATIENT)
Dept: EMERGENCY MEDICINE | Facility: HOSPITAL | Age: 54
DRG: 189 | End: 2024-08-10
Payer: COMMERCIAL

## 2024-08-10 ENCOUNTER — APPOINTMENT (OUTPATIENT)
Dept: RADIOLOGY | Facility: HOSPITAL | Age: 54
DRG: 189 | End: 2024-08-10
Payer: COMMERCIAL

## 2024-08-10 DIAGNOSIS — Z99.2 ESRD (END STAGE RENAL DISEASE) ON DIALYSIS (MULTI): ICD-10-CM

## 2024-08-10 DIAGNOSIS — I82.622 ACUTE DEEP VEIN THROMBOSIS (DVT) OF LEFT UPPER EXTREMITY (MULTI): ICD-10-CM

## 2024-08-10 DIAGNOSIS — N18.6 ESRD (END STAGE RENAL DISEASE) ON DIALYSIS (MULTI): ICD-10-CM

## 2024-08-10 DIAGNOSIS — J81.0 ACUTE PULMONARY EDEMA (MULTI): Primary | ICD-10-CM

## 2024-08-10 DIAGNOSIS — I50.31 ACUTE DIASTOLIC CHF (CONGESTIVE HEART FAILURE) (MULTI): ICD-10-CM

## 2024-08-10 LAB
ALBUMIN SERPL BCP-MCNC: 4 G/DL (ref 3.4–5)
ALP SERPL-CCNC: 123 U/L (ref 33–110)
ALT SERPL W P-5'-P-CCNC: 15 U/L (ref 7–45)
ANION GAP SERPL CALC-SCNC: 16 MMOL/L (ref 10–20)
AST SERPL W P-5'-P-CCNC: 39 U/L (ref 9–39)
BASOPHILS # BLD AUTO: 0.08 X10*3/UL (ref 0–0.1)
BASOPHILS NFR BLD AUTO: 1.3 %
BILIRUB SERPL-MCNC: 0.4 MG/DL (ref 0–1.2)
BNP SERPL-MCNC: 1345 PG/ML (ref 0–99)
BUN SERPL-MCNC: 35 MG/DL (ref 6–23)
CALCIUM SERPL-MCNC: 9.5 MG/DL (ref 8.6–10.6)
CARDIAC TROPONIN I PNL SERPL HS: 34 NG/L (ref 0–34)
CHLORIDE SERPL-SCNC: 97 MMOL/L (ref 98–107)
CO2 SERPL-SCNC: 29 MMOL/L (ref 21–32)
CREAT SERPL-MCNC: 5.75 MG/DL (ref 0.5–1.05)
EGFRCR SERPLBLD CKD-EPI 2021: 8 ML/MIN/1.73M*2
EOSINOPHIL # BLD AUTO: 0.38 X10*3/UL (ref 0–0.7)
EOSINOPHIL NFR BLD AUTO: 6 %
ERYTHROCYTE [DISTWIDTH] IN BLOOD BY AUTOMATED COUNT: 18.2 % (ref 11.5–14.5)
GLUCOSE SERPL-MCNC: 110 MG/DL (ref 74–99)
HCT VFR BLD AUTO: 30.7 % (ref 36–46)
HGB BLD-MCNC: 9.8 G/DL (ref 12–16)
IMM GRANULOCYTES # BLD AUTO: 0.07 X10*3/UL (ref 0–0.7)
IMM GRANULOCYTES NFR BLD AUTO: 1.1 % (ref 0–0.9)
LYMPHOCYTES # BLD AUTO: 0.76 X10*3/UL (ref 1.2–4.8)
LYMPHOCYTES NFR BLD AUTO: 12 %
MCH RBC QN AUTO: 28.9 PG (ref 26–34)
MCHC RBC AUTO-ENTMCNC: 31.9 G/DL (ref 32–36)
MCV RBC AUTO: 91 FL (ref 80–100)
MONOCYTES # BLD AUTO: 0.3 X10*3/UL (ref 0.1–1)
MONOCYTES NFR BLD AUTO: 4.7 %
NEUTROPHILS # BLD AUTO: 4.73 X10*3/UL (ref 1.2–7.7)
NEUTROPHILS NFR BLD AUTO: 74.9 %
NRBC BLD-RTO: 0.3 /100 WBCS (ref 0–0)
PLATELET # BLD AUTO: 204 X10*3/UL (ref 150–450)
POTASSIUM SERPL-SCNC: 5.3 MMOL/L (ref 3.5–5.3)
PROT SERPL-MCNC: 7.5 G/DL (ref 6.4–8.2)
RBC # BLD AUTO: 3.39 X10*6/UL (ref 4–5.2)
SODIUM SERPL-SCNC: 137 MMOL/L (ref 136–145)
WBC # BLD AUTO: 6.3 X10*3/UL (ref 4.4–11.3)

## 2024-08-10 PROCEDURE — 84484 ASSAY OF TROPONIN QUANT: CPT | Performed by: EMERGENCY MEDICINE

## 2024-08-10 PROCEDURE — 2500000001 HC RX 250 WO HCPCS SELF ADMINISTERED DRUGS (ALT 637 FOR MEDICARE OP): Performed by: EMERGENCY MEDICINE

## 2024-08-10 PROCEDURE — 94660 CPAP INITIATION&MGMT: CPT

## 2024-08-10 PROCEDURE — 36415 COLL VENOUS BLD VENIPUNCTURE: CPT | Performed by: EMERGENCY MEDICINE

## 2024-08-10 PROCEDURE — 93010 ELECTROCARDIOGRAM REPORT: CPT | Performed by: EMERGENCY MEDICINE

## 2024-08-10 PROCEDURE — 71045 X-RAY EXAM CHEST 1 VIEW: CPT

## 2024-08-10 PROCEDURE — 99291 CRITICAL CARE FIRST HOUR: CPT | Performed by: EMERGENCY MEDICINE

## 2024-08-10 PROCEDURE — 93005 ELECTROCARDIOGRAM TRACING: CPT

## 2024-08-10 PROCEDURE — 5A09357 ASSISTANCE WITH RESPIRATORY VENTILATION, LESS THAN 24 CONSECUTIVE HOURS, CONTINUOUS POSITIVE AIRWAY PRESSURE: ICD-10-PCS | Performed by: EMERGENCY MEDICINE

## 2024-08-10 PROCEDURE — 94640 AIRWAY INHALATION TREATMENT: CPT

## 2024-08-10 PROCEDURE — 71045 X-RAY EXAM CHEST 1 VIEW: CPT | Performed by: RADIOLOGY

## 2024-08-10 PROCEDURE — 83880 ASSAY OF NATRIURETIC PEPTIDE: CPT | Performed by: EMERGENCY MEDICINE

## 2024-08-10 PROCEDURE — 85025 COMPLETE CBC W/AUTO DIFF WBC: CPT | Performed by: EMERGENCY MEDICINE

## 2024-08-10 PROCEDURE — 80053 COMPREHEN METABOLIC PANEL: CPT | Performed by: EMERGENCY MEDICINE

## 2024-08-10 PROCEDURE — 2500000005 HC RX 250 GENERAL PHARMACY W/O HCPCS: Performed by: EMERGENCY MEDICINE

## 2024-08-10 RX ORDER — NITROGLYCERIN 0.4 MG/1
0.4 TABLET SUBLINGUAL
Status: ACTIVE | OUTPATIENT
Start: 2024-08-10 | End: 2024-08-10

## 2024-08-10 RX ORDER — NITROGLYCERIN 0.4 MG/1
TABLET SUBLINGUAL
Status: COMPLETED
Start: 2024-08-10 | End: 2024-08-10

## 2024-08-10 RX ORDER — NITROGLYCERIN 20 MG/100ML
INJECTION INTRAVENOUS
Status: DISPENSED
Start: 2024-08-10 | End: 2024-08-11

## 2024-08-10 ASSESSMENT — PAIN DESCRIPTION - LOCATION: LOCATION: CHEST

## 2024-08-10 ASSESSMENT — PAIN DESCRIPTION - PROGRESSION: CLINICAL_PROGRESSION: NOT CHANGED

## 2024-08-10 ASSESSMENT — PAIN SCALES - GENERAL
PAINLEVEL_OUTOF10: 7
PAINLEVEL_OUTOF10: 7

## 2024-08-10 ASSESSMENT — LIFESTYLE VARIABLES
EVER HAD A DRINK FIRST THING IN THE MORNING TO STEADY YOUR NERVES TO GET RID OF A HANGOVER: NO
HAVE YOU EVER FELT YOU SHOULD CUT DOWN ON YOUR DRINKING: NO
HAVE PEOPLE ANNOYED YOU BY CRITICIZING YOUR DRINKING: NO
EVER FELT BAD OR GUILTY ABOUT YOUR DRINKING: NO
TOTAL SCORE: 0

## 2024-08-10 ASSESSMENT — COLUMBIA-SUICIDE SEVERITY RATING SCALE - C-SSRS
4. HAVE YOU HAD THESE THOUGHTS AND HAD SOME INTENTION OF ACTING ON THEM?: NO
6. HAVE YOU EVER DONE ANYTHING, STARTED TO DO ANYTHING, OR PREPARED TO DO ANYTHING TO END YOUR LIFE?: NO
2. HAVE YOU ACTUALLY HAD ANY THOUGHTS OF KILLING YOURSELF?: NO
1. IN THE PAST MONTH, HAVE YOU WISHED YOU WERE DEAD OR WISHED YOU COULD GO TO SLEEP AND NOT WAKE UP?: NO
5. HAVE YOU STARTED TO WORK OUT OR WORKED OUT THE DETAILS OF HOW TO KILL YOURSELF? DO YOU INTEND TO CARRY OUT THIS PLAN?: NO

## 2024-08-10 ASSESSMENT — PAIN DESCRIPTION - ORIENTATION: ORIENTATION: MID

## 2024-08-10 ASSESSMENT — PAIN DESCRIPTION - PAIN TYPE: TYPE: ACUTE PAIN

## 2024-08-10 ASSESSMENT — PAIN - FUNCTIONAL ASSESSMENT: PAIN_FUNCTIONAL_ASSESSMENT: 0-10

## 2024-08-10 ASSESSMENT — PAIN SCALES - WONG BAKER: WONGBAKER_NUMERICALRESPONSE: HURTS WHOLE LOT

## 2024-08-11 ENCOUNTER — APPOINTMENT (OUTPATIENT)
Dept: CARE COORDINATION | Age: 54
End: 2024-08-11
Payer: COMMERCIAL

## 2024-08-11 ENCOUNTER — CLINICAL SUPPORT (OUTPATIENT)
Dept: EMERGENCY MEDICINE | Facility: HOSPITAL | Age: 54
DRG: 189 | End: 2024-08-11
Payer: COMMERCIAL

## 2024-08-11 PROBLEM — J81.0 ACUTE PULMONARY EDEMA (MULTI): Status: ACTIVE | Noted: 2024-08-11

## 2024-08-11 LAB
ALBUMIN SERPL BCP-MCNC: 3.4 G/DL (ref 3.4–5)
ANION GAP BLDV CALCULATED.4IONS-SCNC: 11 MMOL/L (ref 10–25)
ANION GAP SERPL CALC-SCNC: 16 MMOL/L (ref 10–20)
ATRIAL RATE: 79 BPM
ATRIAL RATE: 94 BPM
BASE EXCESS BLDV CALC-SCNC: 6.7 MMOL/L (ref -2–3)
BODY TEMPERATURE: 37 DEGREES CELSIUS
BUN SERPL-MCNC: 39 MG/DL (ref 6–23)
CA-I BLDV-SCNC: 1.17 MMOL/L (ref 1.1–1.33)
CALCIUM SERPL-MCNC: 8.7 MG/DL (ref 8.6–10.6)
CARDIAC TROPONIN I PNL SERPL HS: 29 NG/L (ref 0–34)
CHLORIDE BLDV-SCNC: 101 MMOL/L (ref 98–107)
CHLORIDE SERPL-SCNC: 102 MMOL/L (ref 98–107)
CO2 SERPL-SCNC: 28 MMOL/L (ref 21–32)
CREAT SERPL-MCNC: 7.02 MG/DL (ref 0.5–1.05)
EGFRCR SERPLBLD CKD-EPI 2021: 7 ML/MIN/1.73M*2
GLUCOSE BLDV-MCNC: 102 MG/DL (ref 74–99)
GLUCOSE SERPL-MCNC: 84 MG/DL (ref 74–99)
HCO3 BLDV-SCNC: 32.3 MMOL/L (ref 22–26)
HCT VFR BLD EST: 28 % (ref 36–46)
HGB BLDV-MCNC: 9.3 G/DL (ref 12–16)
LACTATE BLDV-SCNC: 0.6 MMOL/L (ref 0.4–2)
MAGNESIUM SERPL-MCNC: 2.22 MG/DL (ref 1.6–2.4)
OXYHGB MFR BLDV: 80.4 % (ref 45–75)
P AXIS: 51 DEGREES
P AXIS: 65 DEGREES
P OFFSET: 191 MS
P OFFSET: 194 MS
P ONSET: 143 MS
P ONSET: 149 MS
PCO2 BLDV: 51 MM HG (ref 41–51)
PH BLDV: 7.41 PH (ref 7.33–7.43)
PHOSPHATE SERPL-MCNC: 6.1 MG/DL (ref 2.5–4.9)
PO2 BLDV: 54 MM HG (ref 35–45)
POTASSIUM BLDV-SCNC: 4.9 MMOL/L (ref 3.5–5.3)
POTASSIUM SERPL-SCNC: 4.8 MMOL/L (ref 3.5–5.3)
PR INTERVAL: 146 MS
PR INTERVAL: 152 MS
Q ONSET: 219 MS
Q ONSET: 222 MS
QRS COUNT: 13 BEATS
QRS COUNT: 16 BEATS
QRS DURATION: 78 MS
QRS DURATION: 80 MS
QT INTERVAL: 356 MS
QT INTERVAL: 384 MS
QTC CALCULATION(BAZETT): 440 MS
QTC CALCULATION(BAZETT): 445 MS
QTC FREDERICIA: 413 MS
QTC FREDERICIA: 421 MS
R AXIS: 59 DEGREES
R AXIS: 62 DEGREES
SAO2 % BLDV: 82 % (ref 45–75)
SODIUM BLDV-SCNC: 139 MMOL/L (ref 136–145)
SODIUM SERPL-SCNC: 141 MMOL/L (ref 136–145)
T AXIS: 244 DEGREES
T AXIS: 268 DEGREES
T OFFSET: 397 MS
T OFFSET: 414 MS
VENTRICULAR RATE: 79 BPM
VENTRICULAR RATE: 94 BPM

## 2024-08-11 PROCEDURE — 36415 COLL VENOUS BLD VENIPUNCTURE: CPT | Performed by: EMERGENCY MEDICINE

## 2024-08-11 PROCEDURE — 2500000002 HC RX 250 W HCPCS SELF ADMINISTERED DRUGS (ALT 637 FOR MEDICARE OP, ALT 636 FOR OP/ED)

## 2024-08-11 PROCEDURE — 1210000001 HC SEMI-PRIVATE ROOM DAILY

## 2024-08-11 PROCEDURE — 83735 ASSAY OF MAGNESIUM: CPT

## 2024-08-11 PROCEDURE — 96375 TX/PRO/DX INJ NEW DRUG ADDON: CPT

## 2024-08-11 PROCEDURE — 84132 ASSAY OF SERUM POTASSIUM: CPT

## 2024-08-11 PROCEDURE — 84484 ASSAY OF TROPONIN QUANT: CPT

## 2024-08-11 PROCEDURE — 36415 COLL VENOUS BLD VENIPUNCTURE: CPT

## 2024-08-11 PROCEDURE — 94660 CPAP INITIATION&MGMT: CPT

## 2024-08-11 PROCEDURE — 96374 THER/PROPH/DIAG INJ IV PUSH: CPT

## 2024-08-11 PROCEDURE — 2500000005 HC RX 250 GENERAL PHARMACY W/O HCPCS: Performed by: EMERGENCY MEDICINE

## 2024-08-11 PROCEDURE — 93005 ELECTROCARDIOGRAM TRACING: CPT

## 2024-08-11 PROCEDURE — 2500000001 HC RX 250 WO HCPCS SELF ADMINISTERED DRUGS (ALT 637 FOR MEDICARE OP)

## 2024-08-11 PROCEDURE — 99222 1ST HOSP IP/OBS MODERATE 55: CPT | Performed by: INTERNAL MEDICINE

## 2024-08-11 PROCEDURE — 2500000004 HC RX 250 GENERAL PHARMACY W/ HCPCS (ALT 636 FOR OP/ED)

## 2024-08-11 RX ORDER — CARVEDILOL 25 MG/1
25 TABLET ORAL ONCE
Status: COMPLETED | OUTPATIENT
Start: 2024-08-11 | End: 2024-08-11

## 2024-08-11 RX ORDER — ALBUTEROL SULFATE 0.83 MG/ML
1.25 SOLUTION RESPIRATORY (INHALATION) EVERY 6 HOURS PRN
Status: DISCONTINUED | OUTPATIENT
Start: 2024-08-11 | End: 2024-08-12 | Stop reason: HOSPADM

## 2024-08-11 RX ORDER — ASPIRIN 81 MG/1
81 TABLET ORAL DAILY
Status: DISCONTINUED | OUTPATIENT
Start: 2024-08-11 | End: 2024-08-12 | Stop reason: HOSPADM

## 2024-08-11 RX ORDER — GABAPENTIN 300 MG/1
300 CAPSULE ORAL NIGHTLY
Status: DISCONTINUED | OUTPATIENT
Start: 2024-08-11 | End: 2024-08-12 | Stop reason: HOSPADM

## 2024-08-11 RX ORDER — CALCIUM ACETATE 667 MG/1
1334 CAPSULE ORAL
Status: DISCONTINUED | OUTPATIENT
Start: 2024-08-11 | End: 2024-08-12 | Stop reason: HOSPADM

## 2024-08-11 RX ORDER — VALSARTAN 320 MG/1
320 TABLET ORAL DAILY
Status: DISCONTINUED | OUTPATIENT
Start: 2024-08-11 | End: 2024-08-12 | Stop reason: HOSPADM

## 2024-08-11 RX ORDER — FUROSEMIDE 10 MG/ML
80 INJECTION INTRAMUSCULAR; INTRAVENOUS ONCE
Status: COMPLETED | OUTPATIENT
Start: 2024-08-11 | End: 2024-08-11

## 2024-08-11 RX ORDER — HYDRALAZINE HYDROCHLORIDE 50 MG/1
100 TABLET, FILM COATED ORAL ONCE
Status: COMPLETED | OUTPATIENT
Start: 2024-08-11 | End: 2024-08-11

## 2024-08-11 RX ORDER — TORSEMIDE 20 MG/1
40 TABLET ORAL
Status: DISCONTINUED | OUTPATIENT
Start: 2024-08-12 | End: 2024-08-12 | Stop reason: HOSPADM

## 2024-08-11 RX ORDER — ATORVASTATIN CALCIUM 80 MG/1
80 TABLET, FILM COATED ORAL NIGHTLY
Status: DISCONTINUED | OUTPATIENT
Start: 2024-08-11 | End: 2024-08-12 | Stop reason: HOSPADM

## 2024-08-11 RX ORDER — FLUTICASONE FUROATE AND VILANTEROL 100; 25 UG/1; UG/1
1 POWDER RESPIRATORY (INHALATION)
Status: DISCONTINUED | OUTPATIENT
Start: 2024-08-11 | End: 2024-08-12 | Stop reason: HOSPADM

## 2024-08-11 RX ORDER — HYDRALAZINE HYDROCHLORIDE 50 MG/1
100 TABLET, FILM COATED ORAL 3 TIMES DAILY
Status: DISCONTINUED | OUTPATIENT
Start: 2024-08-11 | End: 2024-08-12 | Stop reason: HOSPADM

## 2024-08-11 RX ORDER — CLONIDINE HYDROCHLORIDE 0.1 MG/1
0.2 TABLET ORAL 3 TIMES DAILY
Status: DISCONTINUED | OUTPATIENT
Start: 2024-08-11 | End: 2024-08-12 | Stop reason: HOSPADM

## 2024-08-11 RX ORDER — NITROGLYCERIN 0.4 MG/1
0.4 TABLET SUBLINGUAL ONCE
Status: COMPLETED | OUTPATIENT
Start: 2024-08-11 | End: 2024-08-11

## 2024-08-11 RX ORDER — POLYETHYLENE GLYCOL 3350 17 G/17G
17 POWDER, FOR SOLUTION ORAL DAILY PRN
Status: DISCONTINUED | OUTPATIENT
Start: 2024-08-11 | End: 2024-08-12 | Stop reason: HOSPADM

## 2024-08-11 RX ORDER — ISOSORBIDE MONONITRATE 30 MG/1
120 TABLET, EXTENDED RELEASE ORAL DAILY
Status: DISCONTINUED | OUTPATIENT
Start: 2024-08-11 | End: 2024-08-12 | Stop reason: HOSPADM

## 2024-08-11 RX ORDER — CARVEDILOL 25 MG/1
50 TABLET ORAL 2 TIMES DAILY
Status: DISCONTINUED | OUTPATIENT
Start: 2024-08-11 | End: 2024-08-12 | Stop reason: HOSPADM

## 2024-08-11 RX ORDER — NIFEDIPINE 90 MG/1
90 TABLET, EXTENDED RELEASE ORAL
Status: DISCONTINUED | OUTPATIENT
Start: 2024-08-12 | End: 2024-08-12 | Stop reason: HOSPADM

## 2024-08-11 RX ORDER — ACETAMINOPHEN 325 MG/1
975 TABLET ORAL EVERY 6 HOURS PRN
Status: DISCONTINUED | OUTPATIENT
Start: 2024-08-11 | End: 2024-08-12 | Stop reason: HOSPADM

## 2024-08-11 RX ORDER — NIFEDIPINE 30 MG/1
90 TABLET, FILM COATED, EXTENDED RELEASE ORAL ONCE
Status: COMPLETED | OUTPATIENT
Start: 2024-08-11 | End: 2024-08-11

## 2024-08-11 RX ORDER — CLONIDINE HYDROCHLORIDE 0.1 MG/1
0.2 TABLET ORAL ONCE
Status: COMPLETED | OUTPATIENT
Start: 2024-08-11 | End: 2024-08-11

## 2024-08-11 RX ORDER — HYDRALAZINE HYDROCHLORIDE 20 MG/ML
10 INJECTION INTRAMUSCULAR; INTRAVENOUS ONCE
Status: COMPLETED | OUTPATIENT
Start: 2024-08-11 | End: 2024-08-11

## 2024-08-11 ASSESSMENT — ENCOUNTER SYMPTOMS
UNEXPECTED WEIGHT CHANGE: 0
BRUISES/BLEEDS EASILY: 0
EYE REDNESS: 0
EYE DISCHARGE: 0
HEMATURIA: 0
HEADACHES: 1
CONSTIPATION: 0
NUMBNESS: 0
ABDOMINAL PAIN: 0
JOINT SWELLING: 0
FATIGUE: 0
WHEEZING: 0
ADENOPATHY: 0
NAUSEA: 1
BLOOD IN STOOL: 0
DIFFICULTY URINATING: 0
SHORTNESS OF BREATH: 1
WEAKNESS: 0
VOMITING: 0
LIGHT-HEADEDNESS: 0
BACK PAIN: 0
PALPITATIONS: 0
MYALGIAS: 0
NERVOUS/ANXIOUS: 0
SORE THROAT: 0
COUGH: 0
DIZZINESS: 0
WOUND: 0
FEVER: 0
DYSURIA: 0
FREQUENCY: 0
DIARRHEA: 0
RHINORRHEA: 0
POLYDIPSIA: 0
APPETITE CHANGE: 0
CHILLS: 0

## 2024-08-11 ASSESSMENT — PAIN - FUNCTIONAL ASSESSMENT
PAIN_FUNCTIONAL_ASSESSMENT: 0-10
PAIN_FUNCTIONAL_ASSESSMENT: 0-10

## 2024-08-11 ASSESSMENT — PAIN DESCRIPTION - DESCRIPTORS: DESCRIPTORS: CRAMPING

## 2024-08-11 ASSESSMENT — PAIN DESCRIPTION - FREQUENCY: FREQUENCY: INTERMITTENT

## 2024-08-11 ASSESSMENT — PAIN DESCRIPTION - ORIENTATION: ORIENTATION: RIGHT

## 2024-08-11 ASSESSMENT — PAIN DESCRIPTION - LOCATION: LOCATION: HAND

## 2024-08-11 ASSESSMENT — PAIN DESCRIPTION - PAIN TYPE: TYPE: ACUTE PAIN

## 2024-08-11 ASSESSMENT — PAIN SCALES - GENERAL
PAINLEVEL_OUTOF10: 5 - MODERATE PAIN
PAINLEVEL_OUTOF10: 0 - NO PAIN

## 2024-08-11 NOTE — H&P
History of present illness:  Stacey Heath is a 54 y.o. 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 Notably pt was admitted to the MICU 8/8 for hypertensive emergency and AHRF requiring Bipap. Pt was on a nitro gtt, bipap, and received urgent HD in the MICU. She had not missed any HD sessions leading up to this admission therefore this was likely in the setting of uncontrolled HTN. After receiving HD and home BP meds, she was weaned to room air and transferred to the floor on 8/9. Pt's hydralazine was increased to 100mg TID from BID and gabapentin dose was adjusted for renal function. She was discharged home on 8/9. Pt reports on Saturday 8/10 she took all of her medications as prescribed and went to HD as scheduled during which they took off 2.4L without issue. She took her evening medications (including hydral 100mg which she was now taking TID since being discharged). In the late evening she received a call that her close friend had unexpectedly passed away. Shortly thereafter she became suddenly SOB and developed 7/10 midsternal non-radiating chest pain with associated nausea and headache, therefore her  took her to the ED. She denied fevers, chills, worsening cough (has b/l dry cough), vomiting, abdominal pain, diarrhea, constipation, rashes, or LE edema.     While in the ED:  - Vital Signs: no temp taken/ /88/ HR 95/ RR 25/ 98% on RA     Labs:  Last CBC:  Lab Results   Component Value Date    WBC 6.3 08/10/2024    HGB 9.8 (L) 08/10/2024    HCT 30.7 (L) 08/10/2024    MCV 91 08/10/2024     08/10/2024     Last RFP:  Lab Results   Component Value Date    GLUCOSE 110 (H) 08/10/2024    CALCIUM 9.5 08/10/2024     08/10/2024    K 5.3 08/10/2024    CO2 29 08/10/2024    CL 97 (L) 08/10/2024    BUN 35 (H) 08/10/2024    CREATININE  5.75 (H) 08/10/2024     Last LFTs:  Lab Results   Component Value Date    ALT 15 08/10/2024    AST 39 08/10/2024    ALKPHOS 123 (H) 08/10/2024    BILITOT 0.4 08/10/2024     Last coags:  Lab Results   Component Value Date    INR 2.1 (H) 07/14/2024    APTT 22 (L) 05/28/2024     Additional labs:  BNP 1345  HS trop 34  VBG 7.41/51/54 lact 0.6  EKG: NSR with inverted T waves I/II/III/avF/V5/V6; inverted T waves only new in lead III and avF    Imaging:  CXR 8/10: 1.  Interval worsening in interstitial prominence suggestive of  pulmonary edema compared to prior. Atypical infection not excluded.  2. Left pleural effusion, similar to prior imaging.    ED interventions:  Coreg 25mg PO  Clonidine 0.2 PO  Lasix 80mg IV  Hydralazine 10mg IV  Hydralazine 100mg PO  Nifedipine 90mg PO  SL nitro 0.4mg x2    Past Medical History:  See above    Past Surgical History:  She has a past surgical history that includes Tubal ligation (03/07/2017); Other surgical history (03/07/2017); Appendectomy (03/07/2017); Other surgical history (03/07/2017); Other surgical history (12/08/2021); Other surgical history (12/08/2021); and CT angio abdomen w and or wo IV IV contrast (4/23/2023).     Social history:  Home: lives with   Smoking: former smoker, smoked from 18yo and quit 3-4 years ago, was smoking 1p per ev  Alcohol: drinks socially  Drugs: denies    Allergies:  Iodine, Bioflavonoids, Codeine, Flowers, Hydromorphone, and Shellfish containing products    Home medications:  Eliquis 5mg BID  Aspirin 81mg daily  Atorva 80mg at bedtime   Phoslo 1334mg TID  Clonidine 0.2mg TID  Coreg 25mg BID  Breo ellipta 100-25mcg daily  Gabapentin 300mg QHS  Hydralazine 100mg TID  Imdur 120mg daily  Nifedipine 90mg daily  Miralax PRN  Torsemide 40mg on non HD days  Valsartan 320mg daily    Review of systems  Review of Systems   Constitutional:  Negative for appetite change, chills, fatigue, fever and unexpected weight change.   HENT:  Negative for  "rhinorrhea and sore throat.    Eyes:  Negative for discharge and redness.   Respiratory:  Positive for shortness of breath. Negative for cough and wheezing.    Cardiovascular:  Positive for chest pain. Negative for palpitations and leg swelling.   Gastrointestinal:  Positive for nausea. Negative for abdominal pain, blood in stool, constipation, diarrhea and vomiting.   Endocrine: Negative for polydipsia and polyuria.   Genitourinary:  Negative for difficulty urinating, dysuria, frequency, hematuria and urgency.   Musculoskeletal:  Negative for back pain, joint swelling and myalgias.   Skin:  Negative for rash and wound.   Neurological:  Positive for headaches. Negative for dizziness, syncope, weakness, light-headedness and numbness.   Hematological:  Negative for adenopathy. Does not bruise/bleed easily.   Psychiatric/Behavioral:  Negative for behavioral problems. The patient is not nervous/anxious.            Objective     Visit Vitals  BP (!) 205/91   Pulse 80   Resp 11   Ht 1.397 m (4' 7\")   Wt 63.5 kg (140 lb)   SpO2 100%   BMI 32.54 kg/m²   OB Status Postmenopausal   Smoking Status Former   BSA 1.57 m²      Physical exam:  Constitutional: No acute distress, resting comfortably in bed, cooperative  HEENT: Normocephalic, atraumatic, PERRLA, EOMI, moist mucous membranes  Cardiovascular: RRR, normal S1/S2, no murmurs noted, RUE fistula with thrill  Pulmonary: Clear to auscultation b/l, no wheezes/crackles/rhonchi, no increased work of breathing, on 1.5L NC  GI: Soft, non-tender, non-distended, normoactive bowel sounds  : No clifford catheter  Lower extremities: Warm and well perfused, no lower extremity edema  Neuro: A&O x3, able to move all 4 extremities spontaneously  Skin: No rashes or lesions noted  Psych: Appropriate mood and affect         Assessment/Plan   Stacey Heath is a 54 y.o. 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 In the ED, pt was hypertensive and tachypneic with significant pulmonary edema on CXR ultimately requiring bipap. Pt's BP controlled with home antihypertensives and one additional IV hydralazine in ED and pt then transitioned from bipap to 1.5L NC. Given that pt reports taking all of her medications as prescribed and received a full session of HD on 8/10 with no recently missed sessions, pt likely had hypertensive emergency potentially as a result of receiving news that her close friend had passed away. Pt now being admitted to medicine for further management of hypertensive emergency. Will restart home anti-hypertensive medications and consult nephrology.       # Hypertensive emergency  - Recently admitted to MICU 8/8 for hypertensive emergency requiring emergent HD, discharged on 8/9 with hydral TID from BID and dose reduced gabapentin for renal function. Last HD 8/10 with 2.4L off  - On presentation to ED, /88, CXR c/w significant pulmonary edema, and BNP 1345 (although pt has ESRD)  - EKG: NSR with inverted T waves I/II/III/avF/V5/V6; inverted T waves only new in lead III and avF  - HS trop 34  - S/p HD (2.4L off) on 8/10 prior to presentation to ED  - S/p coreg 25mg PO, clonidine 0.2 PO, lasix 80mg IV, hydralazine 10mg IV, hydralazine 100mg PO, nifedipine 90mg PO, and SL nitro 0.4mg in ED  - Headache w/ no focal neurologic deficits  - Chest pain resolved in ED, likely after controlling BP (pt does not know if it resolved after receiving SL nitro)    Plan:  [ ] c/w home Coreg 25mg BID  [ ] c/w home Hydralazine 100mg TID  [ ] c/w home Imdur 120mg daily  [ ] c/w home Nifedipine 90mg daily  [ ] c/w home Torsemide 40mg on non HD days  [ ] c/w home Valsartan 320mg daily  [ ] c/w home  tylenol PRN pain  [ ] Consult nephrology for HD (although presume pt will not need HD today given BP better controlled after restarting home meds)  [ ] HD per nephrology  [ ] Repeat EKG (had new inverted T waves in leads III and avF)  [ ] Repeat HS trop    # HFpEF  - TTE 4/2024: LVEF 60-65%, pseudonormal paternal LV diastolic filling, severe concentric LV hypertrophy, mild/mod dilated LA, RVSP 52.3mmHg  [ ] BP regimen as above w/ torsemide on non HD days    # Brachial DVT (4/14/24)  [ ] C/w eliquis 5mg BID  [ ] Outpatient follow up    # COPD  - Home regimen is breo ellipta daily & albuterol PRN  - No c/f COPD exacerbation given no increased cough or sputum production, and other likely cause of SOB  [ ] C/w home breo ellipta daily  [ ] C/w home albuterol PRN    # T2DM  - HbA1C 5.2% in 6/2024  - No home meds  - POC glucose during recent admission within acceptable range  [ ] C/w home gabapentin 300mg qHS  [ ] No current indication for SSI, CTM    # Misc  [ ] Consult social work, pt would like assistance filling out POA paperwork  [ ] Pt would like meds to beds on discharge (when discharged on Friday she was unable to  her meds)        Access: PIVs, has RUE fistula  O2: 1.5L NC  Diet: Regular diet  GI ppx: None  DVT ppx: Home eliquis 5mg BID  Code status: Full code (confirmed on admission)  Surrogate medical decision maker:   Hai (, 1st contact) [phone]  Diya (daughter) [phone]  Rohini (daughter) [phone]        Elaine Ochoa MD   Internal Medicine PGY-3    Patient is a 54 y.o. 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     She briefly required BIPAP in ED, and weaned off to  NC.   Admission /89.   Restarted her on home medicines, with gradual improvement in BP.   Labs showed potassium of 4.8 and Bicarb of 28.     Monitor oxygen sats, continue home meds.   Nephrology consult in the am for HD.     I have seen and examined the patient, lab work and imaging reviewed independently of the Resident. I agree with assessment and plan. I have personally performed a substantive portion of the encounter.      Chinedu Calderón MD

## 2024-08-11 NOTE — ED PROVIDER NOTES
History of Present Illness     History provided by: Patient  Limitations to History: None  External Records Reviewed with Brief Summary: Discharge Summary from  which showed adm to MICU for SCAPE    HPI:  Stacey Heath is a 54 y.o. female history of hypertension, T2DM, COPD, ESRD (//Sat) presenting to the ED with concern for shortness of breath and volume overload. Patient's last dialysis session was earlier today, reports she got a full session with approximately 2.4 L off.  She was just discharged from the ICU yesterday.  She states that she took all of her blood pressure medications tonight.  No missed doses of medicines.  Expresses frustrations about how she is trying to do all the right things got she keeps getting fluid in her lungs.    Physical Exam   Triage vitals:  T 36.2 °C (97.2 °F)  HR 95  BP (!) 183/88  RR (!) 25  O2 98 % None (Room air)    General: Awake, alert, in acute distress  Eyes: Gaze conjugate.  No scleral icterus or injection  HENT: Normo-cephalic, atraumatic. No stridor  CV: Tachycardic rate, regular rhythm. Radial pulses 2+ bilaterally. Palpable thrill fistular LUE  Resp: Breathing labored, speaking in 2-3 words at a time decreased air movement bilaterally.  Increased work of breathing.   GI: Soft, non-distended, non-tender. No rebound or guarding.  : Deferred  MSK/Extremities: No gross bony deformities. Moving all extremities  Skin: Warm. Appropriate color  Neuro: Alert. Oriented. Face symmetric. Speech is fluent.  Gross strength and sensation intact in b/l UE and LEs  Psych: Appropriate mood and affect      Medical Decision Making & ED Course   Medical Decision Makin y.o. female  w ESRD TTS HTN, HFpEF, T2DM, COPD, brachial DVT on Eliquis (2024) presents with SOB despite HD today with increased fluid removal.Pt was just admitted to the MICU  for hypertensive emergency and respiratory failure requiring Bipap. Here pt had taken home BP meds 1 hr prior to arrival and  hypertensive to 180s-200 SBP. Patient was given nitroglycerin sublingual, and considered nitro gtt but was able to control pressure with iv antihypertensives and nitroglycerin sl. Patient placed on bipap for 6hrs. After which she reported improvement in symptoms and weaned to 2L NC.  In that time chest x-ray was obtained and interpreted with pulmonary edema throughout.  Patient otherwise does not appear clinically fluid overloaded with no lower extremity edema.  She did have B-lines throughout on point-of-care ultrasound.  Her BNP is greater than thousand.  She does take diuretics at baseline and makes some urine in the setting of ESRD.  She was given IV Lasix for further diuresis.  Patient did have mildly elevated potassium without EKG changes.  Patient has elevated creatinine in the setting of ESRD no other electrolyte or hepatic abnormalities.  Her blood gas is within normal limits.  No elevated lactate upon arrival.  She did not have any acute anemia leukocytosis or thrombocytopenia.  She has an elevated troponin in setting of increased work of breathing at 34 but down trended to 29.  I do think not believe this is primary cardiac source of patient's symptomology.  She is to be admitted to medicine for further management of fluid overload leading to pulmonary edema and acute hypoxic respiratory failure.    ----    Differential diagnoses considered include but are not limited to: Flash pulmonary edema fluid overload, hypercapnic respiratory failure,     Social Determinants of Health which Significantly Impact Care: None identified The following actions were taken to address these social determinants: Patient offered evaluation by Social Work    EKG Independent Interpretation: EKG interpreted by myself.  EKG independently interpreted with sinus rhythm with heart rate of 94 bpm FL interval 152 MS QRS 78 MS QTc 356 MS within normal limits.  No acute ST segment elevations or T wave inversions concerning for acute  ischemia though inferior leads to have deeper inversions than previous.     Independent Result Review and Interpretation: Results were independently reviewed and interpreted by myself. Please see ED course and MDM for full interpretation.    Chronic conditions affecting the patient's care: As documented in the MDM    The patient was discussed with the following consultants/services: Hospitalist/Admitting Provider who accepted the patient for admission    Care Considerations: As per MDM    ED Course:  Diagnoses as of 08/14/24 0037   Acute pulmonary edema (Multi)     Disposition   Adm to medicine for Pulmonary edema and hypoxic respiratory failure.    Procedures   Procedures    Patient seen and discussed with ED attending physician.    Jessica Castillo DO  Emergency Medicine     Jessica Castillo DO  Resident  08/14/24 0039

## 2024-08-11 NOTE — ED TRIAGE NOTES
Enters ED for SOB, discharged from ICU today from recent admission for hypervolemia. Baseline history of HTN, AHRF, s/p HD. Reports having dialysis today. T,R, S. During triage, RR=28, grunting, barely able to complete full sentences.

## 2024-08-12 ENCOUNTER — APPOINTMENT (OUTPATIENT)
Dept: DIALYSIS | Facility: HOSPITAL | Age: 54
End: 2024-08-12
Payer: COMMERCIAL

## 2024-08-12 ENCOUNTER — PHARMACY VISIT (OUTPATIENT)
Dept: PHARMACY | Facility: CLINIC | Age: 54
End: 2024-08-12
Payer: MEDICARE

## 2024-08-12 VITALS
RESPIRATION RATE: 16 BRPM | BODY MASS INDEX: 27.55 KG/M2 | SYSTOLIC BLOOD PRESSURE: 165 MMHG | HEIGHT: 55 IN | OXYGEN SATURATION: 100 % | DIASTOLIC BLOOD PRESSURE: 61 MMHG | WEIGHT: 119.05 LBS | HEART RATE: 82 BPM | TEMPERATURE: 97.7 F

## 2024-08-12 LAB
ALBUMIN SERPL BCP-MCNC: 3.4 G/DL (ref 3.4–5)
ANION GAP SERPL CALC-SCNC: 18 MMOL/L (ref 10–20)
BASOPHILS # BLD AUTO: 0.07 X10*3/UL (ref 0–0.1)
BASOPHILS NFR BLD AUTO: 1.5 %
BUN SERPL-MCNC: 58 MG/DL (ref 6–23)
CALCIUM SERPL-MCNC: 8.6 MG/DL (ref 8.6–10.6)
CHLORIDE SERPL-SCNC: 99 MMOL/L (ref 98–107)
CO2 SERPL-SCNC: 28 MMOL/L (ref 21–32)
CREAT SERPL-MCNC: 9.24 MG/DL (ref 0.5–1.05)
EGFRCR SERPLBLD CKD-EPI 2021: 5 ML/MIN/1.73M*2
EOSINOPHIL # BLD AUTO: 0.47 X10*3/UL (ref 0–0.7)
EOSINOPHIL NFR BLD AUTO: 10.2 %
ERYTHROCYTE [DISTWIDTH] IN BLOOD BY AUTOMATED COUNT: 18.5 % (ref 11.5–14.5)
GLUCOSE SERPL-MCNC: 108 MG/DL (ref 74–99)
HCT VFR BLD AUTO: 29.8 % (ref 36–46)
HGB BLD-MCNC: 8.8 G/DL (ref 12–16)
IMM GRANULOCYTES # BLD AUTO: 0.04 X10*3/UL (ref 0–0.7)
IMM GRANULOCYTES NFR BLD AUTO: 0.9 % (ref 0–0.9)
LYMPHOCYTES # BLD AUTO: 0.97 X10*3/UL (ref 1.2–4.8)
LYMPHOCYTES NFR BLD AUTO: 21 %
MAGNESIUM SERPL-MCNC: 2.4 MG/DL (ref 1.6–2.4)
MCH RBC QN AUTO: 29.1 PG (ref 26–34)
MCHC RBC AUTO-ENTMCNC: 29.5 G/DL (ref 32–36)
MCV RBC AUTO: 99 FL (ref 80–100)
MONOCYTES # BLD AUTO: 0.56 X10*3/UL (ref 0.1–1)
MONOCYTES NFR BLD AUTO: 12.1 %
NEUTROPHILS # BLD AUTO: 2.51 X10*3/UL (ref 1.2–7.7)
NEUTROPHILS NFR BLD AUTO: 54.3 %
NRBC BLD-RTO: 0 /100 WBCS (ref 0–0)
PHOSPHATE SERPL-MCNC: 6.7 MG/DL (ref 2.5–4.9)
PLATELET # BLD AUTO: 193 X10*3/UL (ref 150–450)
POTASSIUM SERPL-SCNC: 5.6 MMOL/L (ref 3.5–5.3)
RBC # BLD AUTO: 3.02 X10*6/UL (ref 4–5.2)
SODIUM SERPL-SCNC: 139 MMOL/L (ref 136–145)
WBC # BLD AUTO: 4.6 X10*3/UL (ref 4.4–11.3)

## 2024-08-12 PROCEDURE — 90935 HEMODIALYSIS ONE EVALUATION: CPT | Performed by: INTERNAL MEDICINE

## 2024-08-12 PROCEDURE — 2500000005 HC RX 250 GENERAL PHARMACY W/O HCPCS: Performed by: EMERGENCY MEDICINE

## 2024-08-12 PROCEDURE — 99239 HOSP IP/OBS DSCHRG MGMT >30: CPT | Performed by: INTERNAL MEDICINE

## 2024-08-12 PROCEDURE — 5A1D70Z PERFORMANCE OF URINARY FILTRATION, INTERMITTENT, LESS THAN 6 HOURS PER DAY: ICD-10-PCS | Performed by: INTERNAL MEDICINE

## 2024-08-12 PROCEDURE — 83735 ASSAY OF MAGNESIUM: CPT

## 2024-08-12 PROCEDURE — 2500000001 HC RX 250 WO HCPCS SELF ADMINISTERED DRUGS (ALT 637 FOR MEDICARE OP)

## 2024-08-12 PROCEDURE — 8010000001 HC DIALYSIS - HEMODIALYSIS PER DAY

## 2024-08-12 PROCEDURE — 36415 COLL VENOUS BLD VENIPUNCTURE: CPT

## 2024-08-12 PROCEDURE — 2500000002 HC RX 250 W HCPCS SELF ADMINISTERED DRUGS (ALT 637 FOR MEDICARE OP, ALT 636 FOR OP/ED)

## 2024-08-12 PROCEDURE — 80069 RENAL FUNCTION PANEL: CPT

## 2024-08-12 PROCEDURE — RXMED WILLOW AMBULATORY MEDICATION CHARGE

## 2024-08-12 PROCEDURE — 85025 COMPLETE CBC W/AUTO DIFF WBC: CPT

## 2024-08-12 SDOH — SOCIAL STABILITY: SOCIAL INSECURITY: HAS ANYONE EVER THREATENED TO HURT YOUR FAMILY OR YOUR PETS?: NO

## 2024-08-12 SDOH — SOCIAL STABILITY: SOCIAL INSECURITY: HAVE YOU HAD THOUGHTS OF HARMING ANYONE ELSE?: NO

## 2024-08-12 SDOH — SOCIAL STABILITY: SOCIAL INSECURITY: ARE YOU OR HAVE YOU BEEN THREATENED OR ABUSED PHYSICALLY, EMOTIONALLY, OR SEXUALLY BY ANYONE?: NO

## 2024-08-12 SDOH — SOCIAL STABILITY: SOCIAL INSECURITY: DO YOU FEEL UNSAFE GOING BACK TO THE PLACE WHERE YOU ARE LIVING?: NO

## 2024-08-12 SDOH — SOCIAL STABILITY: SOCIAL INSECURITY: HAVE YOU HAD ANY THOUGHTS OF HARMING ANYONE ELSE?: NO

## 2024-08-12 SDOH — SOCIAL STABILITY: SOCIAL INSECURITY: ARE THERE ANY APPARENT SIGNS OF INJURIES/BEHAVIORS THAT COULD BE RELATED TO ABUSE/NEGLECT?: NO

## 2024-08-12 SDOH — SOCIAL STABILITY: SOCIAL INSECURITY: ABUSE: ADULT

## 2024-08-12 SDOH — SOCIAL STABILITY: SOCIAL INSECURITY: DOES ANYONE TRY TO KEEP YOU FROM HAVING/CONTACTING OTHER FRIENDS OR DOING THINGS OUTSIDE YOUR HOME?: NO

## 2024-08-12 SDOH — SOCIAL STABILITY: SOCIAL INSECURITY: DO YOU FEEL ANYONE HAS EXPLOITED OR TAKEN ADVANTAGE OF YOU FINANCIALLY OR OF YOUR PERSONAL PROPERTY?: NO

## 2024-08-12 SDOH — SOCIAL STABILITY: SOCIAL INSECURITY: WERE YOU ABLE TO COMPLETE ALL THE BEHAVIORAL HEALTH SCREENINGS?: YES

## 2024-08-12 ASSESSMENT — COGNITIVE AND FUNCTIONAL STATUS - GENERAL
MOBILITY SCORE: 20
WALKING IN HOSPITAL ROOM: A LITTLE
CLIMB 3 TO 5 STEPS WITH RAILING: A LITTLE
MOVING TO AND FROM BED TO CHAIR: A LITTLE
CLIMB 3 TO 5 STEPS WITH RAILING: A LITTLE
WALKING IN HOSPITAL ROOM: A LITTLE
MOVING TO AND FROM BED TO CHAIR: A LITTLE
PATIENT BASELINE BEDBOUND: NO
WALKING IN HOSPITAL ROOM: A LITTLE
MOBILITY SCORE: 20
MOVING TO AND FROM BED TO CHAIR: A LITTLE
MOBILITY SCORE: 20
CLIMB 3 TO 5 STEPS WITH RAILING: A LITTLE
DAILY ACTIVITIY SCORE: 24
MOVING TO AND FROM BED TO CHAIR: A LITTLE
DAILY ACTIVITIY SCORE: 24
WALKING IN HOSPITAL ROOM: A LITTLE
STANDING UP FROM CHAIR USING ARMS: A LITTLE
STANDING UP FROM CHAIR USING ARMS: A LITTLE
CLIMB 3 TO 5 STEPS WITH RAILING: A LITTLE
DAILY ACTIVITIY SCORE: 24
STANDING UP FROM CHAIR USING ARMS: A LITTLE
DAILY ACTIVITIY SCORE: 24
MOBILITY SCORE: 20
STANDING UP FROM CHAIR USING ARMS: A LITTLE

## 2024-08-12 ASSESSMENT — ACTIVITIES OF DAILY LIVING (ADL)
TOILETING: INDEPENDENT
WALKS IN HOME: INDEPENDENT
HEARING - LEFT EAR: FUNCTIONAL
DRESSING YOURSELF: INDEPENDENT
PATIENT'S MEMORY ADEQUATE TO SAFELY COMPLETE DAILY ACTIVITIES?: YES
JUDGMENT_ADEQUATE_SAFELY_COMPLETE_DAILY_ACTIVITIES: YES
LACK_OF_TRANSPORTATION: NO
HEARING - RIGHT EAR: FUNCTIONAL
ADEQUATE_TO_COMPLETE_ADL: YES
GROOMING: INDEPENDENT
BATHING: INDEPENDENT
FEEDING YOURSELF: INDEPENDENT

## 2024-08-12 ASSESSMENT — PATIENT HEALTH QUESTIONNAIRE - PHQ9
2. FEELING DOWN, DEPRESSED OR HOPELESS: NOT AT ALL
1. LITTLE INTEREST OR PLEASURE IN DOING THINGS: NOT AT ALL
SUM OF ALL RESPONSES TO PHQ9 QUESTIONS 1 & 2: 0

## 2024-08-12 ASSESSMENT — LIFESTYLE VARIABLES
AUDIT-C TOTAL SCORE: 0
HOW OFTEN DO YOU HAVE 6 OR MORE DRINKS ON ONE OCCASION: NEVER
SKIP TO QUESTIONS 9-10: 1
AUDIT-C TOTAL SCORE: 0
HOW OFTEN DO YOU HAVE A DRINK CONTAINING ALCOHOL: NEVER
HOW MANY STANDARD DRINKS CONTAINING ALCOHOL DO YOU HAVE ON A TYPICAL DAY: PATIENT DOES NOT DRINK

## 2024-08-12 ASSESSMENT — PAIN - FUNCTIONAL ASSESSMENT
PAIN_FUNCTIONAL_ASSESSMENT: 0-10
PAIN_FUNCTIONAL_ASSESSMENT: NO/DENIES PAIN

## 2024-08-12 ASSESSMENT — PAIN SCALES - GENERAL
PAINLEVEL_OUTOF10: 0 - NO PAIN
PAINLEVEL_OUTOF10: 3
PAINLEVEL_OUTOF10: 0 - NO PAIN

## 2024-08-12 ASSESSMENT — PAIN DESCRIPTION - DESCRIPTORS: DESCRIPTORS: HEADACHE

## 2024-08-12 NOTE — NURSING NOTE
.Report to Receiving RN:    Report To: PONCHO Medrano  Time Report Called: 1217  Hand-Off Communication: Pt tolerated HD well, cramping 25 min to the end of treatment, UF off. Fluid removal 1.7 liter Post /77 HR 81  Complications During Treatment: yes, cramping  Ultrafiltration Treatment: No  Medications Administered During Dialysis: No  Blood Products Administered During Dialysis: No  Labs Sent During Dialysis: No  Heparin Drip Rate Changes: No  Dialysis Catheter Dressing: AVF  Last Dressing Change: N/A

## 2024-08-12 NOTE — NURSING NOTE
Report from Sending RN:    Report From: Mitchell  Recent Surgery of Procedure: No  Baseline Level of Consciousness (LOC): A and O x 4  Oxygen Use: Yes  Type: NC  Diabetic: No  Last BP Med Given Day of Dialysis: None  Last Pain Med Given: None  Lab Tests to be Obtained with Dialysis: No  Blood Transfusion to be Given During Dialysis: No  Available IV Access: Yes  Medications to be Administered During Dialysis: No  Continuous IV Infusion Running: No  Restraints on Currently or in the Last 24 Hours: No  Hand-Off Communication: Pt wants to eat before she comes.   Dialysis Catheter Dressing: NA  Last Dressing Change: NA

## 2024-08-12 NOTE — PROGRESS NOTES
Stacey Heath is a 54 y.o. female on day 1 of admission presenting with Acute pulmonary edema (Multi).    TCC made aware by the medical team patient redy for discharge will need transport set for oxygen.  TCC set transport for 5:30 pm with [facility]    [facility] received notification spouse can  the patient and no longer need oxygen. Transport canceled      Delma Gonzalez RN

## 2024-08-12 NOTE — NURSING NOTE
Patient discharged to home, AVS reviewed, patient belongings returned, PIV removed x1 with clean dry dressing applied, ambulated off unit

## 2024-08-12 NOTE — NURSING NOTE
Nursing Admission Note:  0509     08/12/2024    Patient admitted to [place] [address] from the emergency department this morning. Patient belongings are listed below with no home medications being brought with patient this admission:     08/12/24 0509   Belongings Search   Belonging Search Yes   Belongings Searched by Pat De Guzman RN   Patient Belongings at Bedside   Belongings at Bedside Electronic devices;Other (Comment);Jewelry;Clothing;Other valuables  (lime green blanket)   Jewelry Necklace;Bracelet   Clothing Pants;Shirt;Bra;Footwear   Patient Electronics Cell phone;;Tablet   Other Valuables Purse;Wallet   Patient Belongings Sent Home/WIth Family   Belongings Sent Home/With Family None   Patient Belongings Sent to Locker/Safe/With Protective Services   Belongings Sent to Locker/Safe/With Protective Services  No belongings   Patient Medications   Medications brought by patient? No     Patient is a dialysis patient with a AV fistula to the right upper arm. HD days are Tuesday, Thursday and Saturday. Last treatment 08/10/2024.    Patient comes from home with her . Patient ambulates and completes ADLs independently. Patient expressed no concerns with discharge at this time. Patient denies any home care or home oxygen use. Preferred pharmacy is  [facility] here at [facility].     Patient is anticipated to return home with outpatient follow-up and continued dialysis outpatient. Will continue to assess discharge needs throughout this hospitalization.    Patient plan of care ongoing.    Pat CARLSON, RN, CMSRN

## 2024-08-12 NOTE — PROGRESS NOTES
Subjective     Interval History: Stacey Heath    Patient seen on dialysis, no complaints          Current Facility-Administered Medications:     acetaminophen (Tylenol) tablet 975 mg, 975 mg, oral, q6h PRN, Elaine Ochoa MD, 975 mg at 08/11/24 2155    albuterol 2.5 mg /3 mL (0.083 %) nebulizer solution 1.25 mg, 1.25 mg, nebulization, q6h PRN, Elaine Ochoa MD    apixaban (Eliquis) tablet 5 mg, 5 mg, oral, BID, Elaine Ochoa MD, 5 mg at 08/11/24 2155    aspirin EC tablet 81 mg, 81 mg, oral, Daily, Elaine Ochoa MD, 81 mg at 08/11/24 1158    atorvastatin (Lipitor) tablet 80 mg, 80 mg, oral, Nightly, Elaine Ochoa MD, 80 mg at 08/11/24 2155    B complex-vitamin C-folic acid (Nephrocaps) capsule 1 capsule, 1 capsule, oral, Daily, Elaine Ochoa MD, 1 capsule at 08/11/24 1158    calcium acetate (Phoslo) capsule 1,334 mg, 1,334 mg, oral, TID, Elaine Ochoa MD, 1,334 mg at 08/11/24 1941    carvedilol (Coreg) tablet 50 mg, 50 mg, oral, BID, Elaine Ochoa MD, 50 mg at 08/11/24 2155    cloNIDine (Catapres) tablet 0.2 mg, 0.2 mg, oral, TID, Elaine Ochoa MD, 0.2 mg at 08/11/24 2200    fluticasone furoate-vilanteroL (Breo Ellipta) 100-25 mcg/dose inhaler 1 puff, 1 puff, inhalation, Daily, Elaine Ochoa MD, 1 puff at 08/11/24 1158    gabapentin (Neurontin) capsule 300 mg, 300 mg, oral, Nightly, Elaine Ochoa MD, 300 mg at 08/11/24 2155    hydrALAZINE (Apresoline) tablet 100 mg, 100 mg, oral, TID, Elaine Ochoa MD, 100 mg at 08/11/24 2155    isosorbide mononitrate ER (Imdur) 24 hr tablet 120 mg, 120 mg, oral, Daily, Elaine Ochoa MD, 120 mg at 08/11/24 1158    NIFEdipine ER (Adalat CC) 24 hr tablet 90 mg, 90 mg, oral, Daily before breakfast, Elaine Ochoa MD, 90 mg at 08/12/24 0706    oxygen (O2) therapy, , inhalation, Continuous - Inhalation, Sixto Del Valle MD, 2 L/min at 08/12/24 0800    polyethylene glycol (Glycolax, Miralax) packet 17 g, 17 g, oral, Daily PRN, Elaine Ochoa MD    torsemide (Demadex) tablet 40  mg, 40 mg, oral, Once per day on Sunday Monday Wednesday Friday, Elaine Ochoa MD    valsartan (Diovan) tablet 320 mg, 320 mg, oral, Daily, Elaine Ochoa MD, 320 mg at 08/11/24 1159    Physical Exam  Physical Exam  Heart S1 S2 RRR, Lungs CTA, + edema      Vital signs in last 24 hours:  Temp:  [36 °C (96.8 °F)-36.2 °C (97.2 °F)] 36 °C (96.8 °F)  Heart Rate:  [73-85] 79  Resp:  [16-23] 16  BP: (138-162)/(66-74) 162/74  FiO2 (%):  [28 %] 28 %         Labs:  Results from last 7 days   Lab Units 08/12/24  0731   WBC AUTO x10*3/uL 4.6   RBC AUTO x10*6/uL 3.02*   HEMOGLOBIN g/dL 8.8*   HEMATOCRIT % 29.8*     Results from last 7 days   Lab Units 08/12/24  0731 08/11/24  1350 08/10/24  2205   SODIUM mmol/L 139   < > 137   POTASSIUM mmol/L 5.6*   < > 5.3   CHLORIDE mmol/L 99   < > 97*   CO2 mmol/L 28   < > 29   BUN mg/dL 58*   < > 35*   CREATININE mg/dL 9.24*   < > 5.75*   CALCIUM mg/dL 8.6   < > 9.5   PHOSPHORUS mg/dL 6.7*   < >  --    MAGNESIUM mg/dL 2.40   < >  --    BILIRUBIN TOTAL mg/dL  --   --  0.4   ALT U/L  --   --  15   AST U/L  --   --  39    < > = values in this interval not displayed.            Assessment/Plan   Patient seen and examined while on dialysis, recent events, labs, medications reviewed. Will follow overall management per primary team, and continue regular dialysis.    Will need to reassess dry weight in outpatient HD unit.    Principal Problem:    Acute pulmonary edema (Multi)        Abdulaziz Carpenter MD  8/12/2024  11:00 AM

## 2024-08-12 NOTE — NURSING NOTE
Nursing note:  Patient discharged to home today  RN discussed discharge Instructions with patient  patient verbalized understanding and copy of AVS was given to the Patient

## 2024-08-12 NOTE — DISCHARGE SUMMARY
Discharge Diagnosis  Acute pulmonary edema (Multi)    Issues Requiring Follow-Up  PCP follow up for health maintenance.   Continue HD in the community. ( Tue, Thu, Saturdays)    Discharge Meds     Your medication list        CONTINUE taking these medications        Instructions Last Dose Given Next Dose Due   acetaminophen 325 mg tablet  Commonly known as: Tylenol      Take 2 tablets (650 mg) by mouth every 4 hours if needed for mild pain (1 - 3) or moderate pain (4 - 6).       albuterol sulfate 90 mcg/actuation aero powdr breath act w/sensor inhaler  Commonly known as: Proair Digihaler           albuterol 1.25 mg/3 mL nebulizer solution      Take 3 mL (1.25 mg) by nebulization every 6 hours if needed for wheezing.       apixaban 5 mg tablet  Commonly known as: Eliquis      Take 1 tablet (5 mg) by mouth 2 times a day.       aspirin 81 mg EC tablet           atorvastatin 80 mg tablet  Commonly known as: Lipitor      Take 1 tablet (80 mg) by mouth once daily at bedtime.       B complex-vitamin C-folic acid 1 mg capsule  Commonly known as: Nephrocaps      Take 1 capsule by mouth once daily.       benzonatate 100 mg capsule  Commonly known as: Tessalon      Take 1 capsule (100 mg) by mouth 3 times a day as needed for cough. Do not crush or chew.       calcium acetate 667 mg capsule  Commonly known as: Phoslo      Take 2 capsules (1,334 mg) by mouth 3 times daily (morning, midday, late afternoon).       carvedilol 25 mg tablet  Commonly known as: Coreg      Take 2 tablets (50 mg) by mouth 2 times a day.       cloNIDine 0.2 mg tablet  Commonly known as: Catapres      Take 1 tablet (0.2 mg) by mouth 3 times a day.       Deep Sea Nasal 0.65 % nasal spray  Generic drug: sodium chloride      Administer 1 spray into each nostril 4 times a day as needed for congestion.       diphenhydrAMINE 25 mg capsule  Commonly known as: BENADryl           epoetin joceline 10,000 unit/mL injection  Commonly known as: Epogen,Procrit      Inject 1  mL (10,000 Units) under the skin 3 times a week.       fluticasone 50 mcg/actuation nasal spray  Commonly known as: Flonase      Administer 2 sprays into each nostril once daily. Shake gently. Before first use, prime pump. After use, clean tip and replace cap.       fluticasone furoate-vilanteroL 100-25 mcg/dose inhaler  Commonly known as: Breo Ellipta      Inhale 1 puff once daily.       gabapentin 300 mg capsule  Commonly known as: Neurontin      Take 1 capsule (300 mg) by mouth once daily at bedtime.       hydrALAZINE 100 mg tablet  Commonly known as: Apresoline      Take 1 tablet (100 mg) by mouth 3 times a day.       isosorbide mononitrate  mg 24 hr tablet  Commonly known as: Imdur      Take 1 tablet (120 mg) by mouth once daily. Do not crush or chew.       NIFEdipine ER 90 mg 24 hr tablet  Commonly known as: Adalat CC      Take 1 tablet (90 mg) by mouth once daily in the morning. Take before meals. Do not crush, chew, or split.       polyethylene glycol 17 gram/dose powder  Commonly known as: Glycolax, Miralax      Take 17 g (1 scoop dissolved in liquid) by mouth once daily. Do not start before July 26, 2024.       torsemide 20 mg tablet  Commonly known as: Demadex      Take 2 tablets once daily on non-dialysis days only. Do not take on dialysis days       valsartan 320 mg tablet  Commonly known as: Diovan      Take 1 tablet (320 mg) by mouth once daily.                 Where to Get Your Medications        These medications were sent to [facility]  [address], [address], [place] [id number]      Hours: 8AM to 6PM Mon-Fri, 8AM to 4PM Sat, 9AM to 1PM Sun Phone: [phone]   apixaban 5 mg tablet  B complex-vitamin C-folic acid 1 mg capsule         Test Results Pending At Discharge  Pending Labs       Order Current Status    Blood Gas Venous Full Panel In process            Hospital Course    Patient is a 54 y.o. 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      She briefly required CPAP in the ED, and weaned off to oxygen through [place].   Admission BP was 206/89.   She was restarted her on home medicines, with gradual improvement in BP.   Labs showed potassium of 4.8 and Bicarb of 28.      She had HD on 8/12, and able to wean her off to RA after HD.   She was continued on home antihypertensives including carvedilol, clonidine and Hydralazine.   She reported that she monitors her BP at home. She was cautious with antihypertensives on HD days, and holds meds prior to HD.   Discharged home in a stable condition.  Discharge day management time > 30 minutes.       Pertinent Physical Exam At Time of Discharge  Vitals:    08/12/24 1424   BP: 165/61   Pulse:    Resp:    Temp:    SpO2:        Physical Exam  HENT:      Head: Normocephalic.      Nose: Nose normal.   Eyes:      Extraocular Movements: Extraocular movements intact.      Pupils: Pupils are equal, round, and reactive to light.   Cardiovascular:      Rate and Rhythm: Normal rate and regular rhythm.      Heart sounds: Normal heart sounds. No murmur heard.  Pulmonary:      Effort: No respiratory distress.      Breath sounds: Normal breath sounds. No wheezing.      Comments: Comfortable on RA   Abdominal:      General: There is no distension.      Palpations: Abdomen is soft.      Tenderness: There is no abdominal tenderness.   Musculoskeletal:         General: Normal range of motion.      Cervical back: Normal range of motion.   Skin:     General: Skin is warm.   Neurological:      General: No focal deficit present.      Mental Status: She is alert and oriented to person, place, and time. Mental status is at  baseline.   Psychiatric:         Mood and Affect: Mood normal.         Outpatient Follow-Up  Future Appointments   Date Time Provider Department Center   8/14/2024  2:40 PM Marbella Rosales APRN-CNP [id number] [facility]   8/19/2024  2:30 PM [facility] BOL6F PFT RM 1 [id number] [facility]   8/19/2024  3:30 PM [facility]OL6[facility] PFT [facility] RM [id number] [facility]   8/19/2024  4:00 PM [facility] BOL6F PFT [facility] RM [id number] [facility]   8/28/2024 11:20 AM Judy Alcaraz MD [id number] [facility]   10/4/2024  3:15 PM [facility] CAIC CT 1 CMCCAICCT [facility]         Chinedu Calderón MD

## 2024-08-14 ENCOUNTER — DOCUMENTATION (OUTPATIENT)
Dept: BEHAVIORAL HEALTH | Facility: CLINIC | Age: 54
End: 2024-08-14
Payer: COMMERCIAL

## 2024-08-14 DIAGNOSIS — F41.9 ANXIETY: ICD-10-CM

## 2024-08-14 LAB
ATRIAL RATE: 87 BPM
P AXIS: 40 DEGREES
P OFFSET: 192 MS
P ONSET: 143 MS
PR INTERVAL: 152 MS
Q ONSET: 219 MS
QRS COUNT: 15 BEATS
QRS DURATION: 94 MS
QT INTERVAL: 370 MS
QTC CALCULATION(BAZETT): 445 MS
QTC FREDERICIA: 418 MS
R AXIS: 12 DEGREES
T AXIS: 166 DEGREES
T OFFSET: 404 MS
VENTRICULAR RATE: 87 BPM

## 2024-08-14 NOTE — ED PROCEDURE NOTE
Procedure  Critical Care    Performed by: Sixto Del Valle MD  Authorized by: Sixto Del Valle MD    Critical care provider statement:     Critical care time (minutes):  35    Critical care was necessary to treat or prevent imminent or life-threatening deterioration of the following conditions:  Respiratory failure and renal failure    Critical care was time spent personally by me on the following activities:  Ventilator management, re-evaluation of patient's condition, ordering and review of laboratory studies, ordering and review of radiographic studies, ordering and performing treatments and interventions and obtaining history from patient or surrogate    Care discussed with: admitting provider               Sixto Del Valle MD  08/14/24 5813

## 2024-08-14 NOTE — PROGRESS NOTES
Stacey Heath  Age: 54 y.o.  MRN: [id number]  Date: 8/14/2024  Location of service: phone call    Program Details  Medicaid Community Clinical Case Management  Status: Enrolled  Effective Dates: 10/17/2023 - present  Responsible Staff: NAREN Davidson      Goals Reviewed:  Problem: Anxiety       Goal: Attempts to manage anxiety with help       Priority: High         Goal: Verbalizes ways to manage anxiety       Priority: High         Goal: Implements measures to reduce anxiety       Priority: High        Problem: Financial Stressors       Goal: Assistance with financial concerns           Summary:  This provider made telephone contact with the patient. This provider listened with empathy as patient explained that her recent hospital stay was a result of self-induced stress. The patient had received a telephone call informing her that her best friend for life had passed away the day before. According to the patient, this caused her distress and an increase in her blood pressure. This informed the patient that their appointment for today needed to be rescheduled for Friday, August 15th, 2024. The patient agreed to the new day and time. This provider also informed patient of new changes with SNAP benefits as patient is up for redetermination.                  NAREN Davidson

## 2024-08-15 LAB
ATRIAL RATE: 92 BPM
P AXIS: 49 DEGREES
P OFFSET: 200 MS
P ONSET: 150 MS
PR INTERVAL: 142 MS
Q ONSET: 221 MS
QRS COUNT: 15 BEATS
QRS DURATION: 74 MS
QT INTERVAL: 358 MS
QTC CALCULATION(BAZETT): 442 MS
QTC FREDERICIA: 412 MS
R AXIS: 97 DEGREES
T AXIS: -71 DEGREES
T OFFSET: 400 MS
VENTRICULAR RATE: 92 BPM

## 2024-08-16 ENCOUNTER — DOCUMENTATION (OUTPATIENT)
Dept: BEHAVIORAL HEALTH | Facility: CLINIC | Age: 54
End: 2024-08-16
Payer: COMMERCIAL

## 2024-08-16 DIAGNOSIS — F41.9 ANXIETY: ICD-10-CM

## 2024-08-16 PROCEDURE — H2019 THER BEHAV SVC, PER 15 MIN: HCPCS | Mod: HE | Performed by: COMMUNITY/BEHAVIORAL HEALTH

## 2024-08-19 ENCOUNTER — PATIENT OUTREACH (OUTPATIENT)
Dept: CARE COORDINATION | Facility: CLINIC | Age: 54
End: 2024-08-19
Payer: COMMERCIAL

## 2024-08-19 NOTE — PROGRESS NOTES
Stacey Heath  Age: 54 y.o.  MRN: [id number]  Date: 8/16/2024  Location of service: in community    Program Details  Medicaid Community Clinical Case Management  Status: Enrolled  Effective Dates: 10/17/2023 - present  Responsible Staff: NAREN Davidson      Goals Reviewed:  Problem: Anxiety       Goal: Attempts to manage anxiety with help       Priority: High         Goal: Verbalizes ways to manage anxiety       Priority: High         Goal: Implements measures to reduce anxiety       Priority: High          Problem: Kidney Issues       Goal: Improve health to get on kidney transplant list       Priority: High              Summary:  This provider met with patient at the patient's home. This provider used active listening as patient explained her frustration with the [place] [employer] over them not calling her for the scheduled redetermination of SNAP benefits. Provider and patient discussed in depth the patient's experience with dialysis at a couple of different dialysis locations. Patient is looking forward to utilizing the service of [facility] again but is concerned that her medical insurance will not cover the service. Patient is starting to move into a healthy mental state regarding her inability to work due to her illness.                  NAREN Davidson

## 2024-08-21 ENCOUNTER — HOSPITAL ENCOUNTER (INPATIENT)
Facility: HOSPITAL | Age: 54
LOS: 3 days | Discharge: HOME | End: 2024-08-25
Attending: STUDENT IN AN ORGANIZED HEALTH CARE EDUCATION/TRAINING PROGRAM | Admitting: STUDENT IN AN ORGANIZED HEALTH CARE EDUCATION/TRAINING PROGRAM
Payer: COMMERCIAL

## 2024-08-21 ENCOUNTER — DOCUMENTATION (OUTPATIENT)
Dept: BEHAVIORAL HEALTH | Facility: CLINIC | Age: 54
End: 2024-08-21
Payer: COMMERCIAL

## 2024-08-21 ENCOUNTER — APPOINTMENT (OUTPATIENT)
Dept: RADIOLOGY | Facility: HOSPITAL | Age: 54
DRG: 304 | End: 2024-08-21
Payer: COMMERCIAL

## 2024-08-21 DIAGNOSIS — J81.0 FLASH PULMONARY EDEMA (MULTI): ICD-10-CM

## 2024-08-21 DIAGNOSIS — F41.8 ANXIETY ABOUT HEALTH: ICD-10-CM

## 2024-08-21 DIAGNOSIS — I50.31 ACUTE DIASTOLIC CHF (CONGESTIVE HEART FAILURE) (MULTI): ICD-10-CM

## 2024-08-21 DIAGNOSIS — G47.33 OSA (OBSTRUCTIVE SLEEP APNEA): ICD-10-CM

## 2024-08-21 DIAGNOSIS — F41.9 ANXIETY: ICD-10-CM

## 2024-08-21 DIAGNOSIS — I16.1 HYPERTENSIVE EMERGENCY: Primary | ICD-10-CM

## 2024-08-21 DIAGNOSIS — I16.0 HYPERTENSIVE URGENCY: ICD-10-CM

## 2024-08-21 DIAGNOSIS — I10 ESSENTIAL HYPERTENSION: ICD-10-CM

## 2024-08-21 LAB
ANION GAP BLDV CALCULATED.4IONS-SCNC: 14 MMOL/L (ref 10–25)
BASE EXCESS BLDV CALC-SCNC: 3.1 MMOL/L (ref -2–3)
BODY TEMPERATURE: 37 DEGREES CELSIUS
CA-I BLDV-SCNC: 1.1 MMOL/L (ref 1.1–1.33)
CHLORIDE BLDV-SCNC: 99 MMOL/L (ref 98–107)
GLUCOSE BLDV-MCNC: 90 MG/DL (ref 74–99)
HCO3 BLDV-SCNC: 29 MMOL/L (ref 22–26)
HCT VFR BLD EST: 36 % (ref 36–46)
HGB BLDV-MCNC: 12.1 G/DL (ref 12–16)
INHALED O2 CONCENTRATION: 35 %
LACTATE BLDV-SCNC: 0.9 MMOL/L (ref 0.4–2)
OXYHGB MFR BLDV: 37.6 % (ref 45–75)
PCO2 BLDV: 49 MM HG (ref 41–51)
PH BLDV: 7.38 PH (ref 7.33–7.43)
PO2 BLDV: 32 MM HG (ref 35–45)
POTASSIUM BLDV-SCNC: 5.3 MMOL/L (ref 3.5–5.3)
SAO2 % BLDV: 38 % (ref 45–75)
SODIUM BLDV-SCNC: 137 MMOL/L (ref 136–145)

## 2024-08-21 PROCEDURE — 99291 CRITICAL CARE FIRST HOUR: CPT | Performed by: STUDENT IN AN ORGANIZED HEALTH CARE EDUCATION/TRAINING PROGRAM

## 2024-08-21 PROCEDURE — 71045 X-RAY EXAM CHEST 1 VIEW: CPT

## 2024-08-21 PROCEDURE — 94660 CPAP INITIATION&MGMT: CPT

## 2024-08-21 PROCEDURE — 85025 COMPLETE CBC W/AUTO DIFF WBC: CPT | Performed by: STUDENT IN AN ORGANIZED HEALTH CARE EDUCATION/TRAINING PROGRAM

## 2024-08-21 PROCEDURE — 84132 ASSAY OF SERUM POTASSIUM: CPT | Performed by: STUDENT IN AN ORGANIZED HEALTH CARE EDUCATION/TRAINING PROGRAM

## 2024-08-21 PROCEDURE — 2500000002 HC RX 250 W HCPCS SELF ADMINISTERED DRUGS (ALT 637 FOR MEDICARE OP, ALT 636 FOR OP/ED): Performed by: STUDENT IN AN ORGANIZED HEALTH CARE EDUCATION/TRAINING PROGRAM

## 2024-08-21 PROCEDURE — 84100 ASSAY OF PHOSPHORUS: CPT | Performed by: STUDENT IN AN ORGANIZED HEALTH CARE EDUCATION/TRAINING PROGRAM

## 2024-08-21 PROCEDURE — 83880 ASSAY OF NATRIURETIC PEPTIDE: CPT | Performed by: STUDENT IN AN ORGANIZED HEALTH CARE EDUCATION/TRAINING PROGRAM

## 2024-08-21 PROCEDURE — 93010 ELECTROCARDIOGRAM REPORT: CPT | Performed by: STUDENT IN AN ORGANIZED HEALTH CARE EDUCATION/TRAINING PROGRAM

## 2024-08-21 PROCEDURE — 36556 INSERT NON-TUNNEL CV CATH: CPT | Performed by: STUDENT IN AN ORGANIZED HEALTH CARE EDUCATION/TRAINING PROGRAM

## 2024-08-21 PROCEDURE — H2019 THER BEHAV SVC, PER 15 MIN: HCPCS | Mod: HE | Performed by: COMMUNITY/BEHAVIORAL HEALTH

## 2024-08-21 PROCEDURE — 83735 ASSAY OF MAGNESIUM: CPT | Performed by: STUDENT IN AN ORGANIZED HEALTH CARE EDUCATION/TRAINING PROGRAM

## 2024-08-21 PROCEDURE — 84484 ASSAY OF TROPONIN QUANT: CPT | Performed by: STUDENT IN AN ORGANIZED HEALTH CARE EDUCATION/TRAINING PROGRAM

## 2024-08-21 PROCEDURE — 94640 AIRWAY INHALATION TREATMENT: CPT

## 2024-08-21 PROCEDURE — 86901 BLOOD TYPING SEROLOGIC RH(D): CPT | Performed by: STUDENT IN AN ORGANIZED HEALTH CARE EDUCATION/TRAINING PROGRAM

## 2024-08-21 PROCEDURE — 36415 COLL VENOUS BLD VENIPUNCTURE: CPT | Performed by: STUDENT IN AN ORGANIZED HEALTH CARE EDUCATION/TRAINING PROGRAM

## 2024-08-21 PROCEDURE — 2500000001 HC RX 250 WO HCPCS SELF ADMINISTERED DRUGS (ALT 637 FOR MEDICARE OP)

## 2024-08-21 RX ORDER — NITROGLYCERIN 0.4 MG/1
0.4 TABLET SUBLINGUAL ONCE
Status: COMPLETED | OUTPATIENT
Start: 2024-08-22 | End: 2024-08-21

## 2024-08-21 RX ORDER — NITROGLYCERIN 0.4 MG/1
TABLET SUBLINGUAL
Status: COMPLETED
Start: 2024-08-21 | End: 2024-08-21

## 2024-08-21 RX ORDER — LIDOCAINE HYDROCHLORIDE 20 MG/ML
10 INJECTION, SOLUTION INFILTRATION; PERINEURAL ONCE
Status: COMPLETED | OUTPATIENT
Start: 2024-08-21 | End: 2024-08-22

## 2024-08-21 RX ORDER — FUROSEMIDE 10 MG/ML
40 INJECTION INTRAMUSCULAR; INTRAVENOUS ONCE
Status: COMPLETED | OUTPATIENT
Start: 2024-08-21 | End: 2024-08-22

## 2024-08-21 RX ORDER — IPRATROPIUM BROMIDE AND ALBUTEROL SULFATE 2.5; .5 MG/3ML; MG/3ML
3 SOLUTION RESPIRATORY (INHALATION) EVERY 20 MIN
Status: COMPLETED | OUTPATIENT
Start: 2024-08-21 | End: 2024-08-21

## 2024-08-21 RX ORDER — NITROGLYCERIN 0.4 MG/1
0.4 TABLET SUBLINGUAL ONCE
Status: COMPLETED | OUTPATIENT
Start: 2024-08-21 | End: 2024-08-21

## 2024-08-21 ASSESSMENT — COLUMBIA-SUICIDE SEVERITY RATING SCALE - C-SSRS
4. HAVE YOU HAD THESE THOUGHTS AND HAD SOME INTENTION OF ACTING ON THEM?: NO
6. HAVE YOU EVER DONE ANYTHING, STARTED TO DO ANYTHING, OR PREPARED TO DO ANYTHING TO END YOUR LIFE?: NO
5. HAVE YOU STARTED TO WORK OUT OR WORKED OUT THE DETAILS OF HOW TO KILL YOURSELF? DO YOU INTEND TO CARRY OUT THIS PLAN?: NO
1. IN THE PAST MONTH, HAVE YOU WISHED YOU WERE DEAD OR WISHED YOU COULD GO TO SLEEP AND NOT WAKE UP?: NO
2. HAVE YOU ACTUALLY HAD ANY THOUGHTS OF KILLING YOURSELF?: NO

## 2024-08-22 ENCOUNTER — CLINICAL SUPPORT (OUTPATIENT)
Dept: EMERGENCY MEDICINE | Facility: HOSPITAL | Age: 54
DRG: 304 | End: 2024-08-22
Payer: COMMERCIAL

## 2024-08-22 ENCOUNTER — APPOINTMENT (OUTPATIENT)
Dept: DIALYSIS | Facility: HOSPITAL | Age: 54
End: 2024-08-22
Payer: COMMERCIAL

## 2024-08-22 PROBLEM — I16.1 HYPERTENSIVE EMERGENCY: Status: ACTIVE | Noted: 2024-08-22

## 2024-08-22 LAB
ABO GROUP (TYPE) IN BLOOD: NORMAL
ALBUMIN SERPL BCP-MCNC: 4.1 G/DL (ref 3.4–5)
ALBUMIN SERPL BCP-MCNC: 4.6 G/DL (ref 3.4–5)
ALP SERPL-CCNC: 125 U/L (ref 33–110)
ALT SERPL W P-5'-P-CCNC: 10 U/L (ref 7–45)
ANION GAP BLDV CALCULATED.4IONS-SCNC: 18 MMOL/L (ref 10–25)
ANION GAP SERPL CALC-SCNC: 24 MMOL/L (ref 10–20)
ANION GAP SERPL CALC-SCNC: 24 MMOL/L (ref 10–20)
ANTIBODY SCREEN: NORMAL
AST SERPL W P-5'-P-CCNC: 23 U/L (ref 9–39)
ATRIAL RATE: 81 BPM
BASE EXCESS BLDV CALC-SCNC: -1.2 MMOL/L (ref -2–3)
BASOPHILS # BLD AUTO: 0.05 X10*3/UL (ref 0–0.1)
BASOPHILS # BLD AUTO: 0.07 X10*3/UL (ref 0–0.1)
BASOPHILS NFR BLD AUTO: 0.9 %
BASOPHILS NFR BLD AUTO: 1.4 %
BILIRUB SERPL-MCNC: 0.4 MG/DL (ref 0–1.2)
BNP SERPL-MCNC: 1532 PG/ML (ref 0–99)
BODY TEMPERATURE: 37 DEGREES CELSIUS
BUN SERPL-MCNC: 61 MG/DL (ref 6–23)
BUN SERPL-MCNC: 61 MG/DL (ref 6–23)
CA-I BLDV-SCNC: 1.11 MMOL/L (ref 1.1–1.33)
CALCIUM SERPL-MCNC: 9.4 MG/DL (ref 8.6–10.6)
CALCIUM SERPL-MCNC: 9.7 MG/DL (ref 8.6–10.6)
CARDIAC TROPONIN I PNL SERPL HS: 55 NG/L (ref 0–34)
CARDIAC TROPONIN I PNL SERPL HS: 61 NG/L (ref 0–34)
CHLORIDE BLDV-SCNC: 101 MMOL/L (ref 98–107)
CHLORIDE SERPL-SCNC: 100 MMOL/L (ref 98–107)
CHLORIDE SERPL-SCNC: 97 MMOL/L (ref 98–107)
CO2 SERPL-SCNC: 22 MMOL/L (ref 21–32)
CO2 SERPL-SCNC: 25 MMOL/L (ref 21–32)
CREAT SERPL-MCNC: 10.09 MG/DL (ref 0.5–1.05)
CREAT SERPL-MCNC: 10.18 MG/DL (ref 0.5–1.05)
EGFRCR SERPLBLD CKD-EPI 2021: 4 ML/MIN/1.73M*2
EGFRCR SERPLBLD CKD-EPI 2021: 4 ML/MIN/1.73M*2
EOSINOPHIL # BLD AUTO: 0.55 X10*3/UL (ref 0–0.7)
EOSINOPHIL # BLD AUTO: 0.59 X10*3/UL (ref 0–0.7)
EOSINOPHIL NFR BLD AUTO: 11.8 %
EOSINOPHIL NFR BLD AUTO: 9.4 %
ERYTHROCYTE [DISTWIDTH] IN BLOOD BY AUTOMATED COUNT: 16 % (ref 11.5–14.5)
ERYTHROCYTE [DISTWIDTH] IN BLOOD BY AUTOMATED COUNT: 16.1 % (ref 11.5–14.5)
GLUCOSE BLDV-MCNC: 77 MG/DL (ref 74–99)
GLUCOSE SERPL-MCNC: 66 MG/DL (ref 74–99)
GLUCOSE SERPL-MCNC: 79 MG/DL (ref 74–99)
HCO3 BLDV-SCNC: 23.6 MMOL/L (ref 22–26)
HCT VFR BLD AUTO: 31.6 % (ref 36–46)
HCT VFR BLD AUTO: 36.9 % (ref 36–46)
HCT VFR BLD EST: 32 % (ref 36–46)
HGB BLD-MCNC: 10.3 G/DL (ref 12–16)
HGB BLD-MCNC: 11.9 G/DL (ref 12–16)
HGB BLDV-MCNC: 10.6 G/DL (ref 12–16)
IMM GRANULOCYTES # BLD AUTO: 0.01 X10*3/UL (ref 0–0.7)
IMM GRANULOCYTES # BLD AUTO: 0.01 X10*3/UL (ref 0–0.7)
IMM GRANULOCYTES NFR BLD AUTO: 0.2 % (ref 0–0.9)
IMM GRANULOCYTES NFR BLD AUTO: 0.2 % (ref 0–0.9)
INHALED O2 CONCENTRATION: 21 %
LACTATE BLDV-SCNC: 0.6 MMOL/L (ref 0.4–2)
LYMPHOCYTES # BLD AUTO: 1.08 X10*3/UL (ref 1.2–4.8)
LYMPHOCYTES # BLD AUTO: 1.3 X10*3/UL (ref 1.2–4.8)
LYMPHOCYTES NFR BLD AUTO: 18.5 %
LYMPHOCYTES NFR BLD AUTO: 25.9 %
MAGNESIUM SERPL-MCNC: 2.45 MG/DL (ref 1.6–2.4)
MAGNESIUM SERPL-MCNC: 2.57 MG/DL (ref 1.6–2.4)
MCH RBC QN AUTO: 28.4 PG (ref 26–34)
MCH RBC QN AUTO: 28.5 PG (ref 26–34)
MCHC RBC AUTO-ENTMCNC: 32.2 G/DL (ref 32–36)
MCHC RBC AUTO-ENTMCNC: 32.6 G/DL (ref 32–36)
MCV RBC AUTO: 88 FL (ref 80–100)
MCV RBC AUTO: 88 FL (ref 80–100)
MONOCYTES # BLD AUTO: 0.32 X10*3/UL (ref 0.1–1)
MONOCYTES # BLD AUTO: 0.47 X10*3/UL (ref 0.1–1)
MONOCYTES NFR BLD AUTO: 6.4 %
MONOCYTES NFR BLD AUTO: 8.1 %
NEUTROPHILS # BLD AUTO: 2.73 X10*3/UL (ref 1.2–7.7)
NEUTROPHILS # BLD AUTO: 3.67 X10*3/UL (ref 1.2–7.7)
NEUTROPHILS NFR BLD AUTO: 54.3 %
NEUTROPHILS NFR BLD AUTO: 62.9 %
NRBC BLD-RTO: 0 /100 WBCS (ref 0–0)
NRBC BLD-RTO: 0 /100 WBCS (ref 0–0)
OXYHGB MFR BLDV: 86 % (ref 45–75)
P AXIS: 65 DEGREES
P OFFSET: 191 MS
P ONSET: 140 MS
PCO2 BLDV: 39 MM HG (ref 41–51)
PH BLDV: 7.39 PH (ref 7.33–7.43)
PHOSPHATE SERPL-MCNC: 9.5 MG/DL (ref 2.5–4.9)
PHOSPHATE SERPL-MCNC: 9.6 MG/DL (ref 2.5–4.9)
PLATELET # BLD AUTO: 200 X10*3/UL (ref 150–450)
PLATELET # BLD AUTO: 227 X10*3/UL (ref 150–450)
PO2 BLDV: 60 MM HG (ref 35–45)
POTASSIUM BLDV-SCNC: 4.6 MMOL/L (ref 3.5–5.3)
POTASSIUM SERPL-SCNC: 4.5 MMOL/L (ref 3.5–5.3)
POTASSIUM SERPL-SCNC: 5 MMOL/L (ref 3.5–5.3)
PR INTERVAL: 158 MS
PROT SERPL-MCNC: 8.1 G/DL (ref 6.4–8.2)
Q ONSET: 219 MS
QRS COUNT: 13 BEATS
QRS DURATION: 84 MS
QT INTERVAL: 390 MS
QTC CALCULATION(BAZETT): 453 MS
QTC FREDERICIA: 430 MS
R AXIS: 62 DEGREES
RBC # BLD AUTO: 3.61 X10*6/UL (ref 4–5.2)
RBC # BLD AUTO: 4.19 X10*6/UL (ref 4–5.2)
RH FACTOR (ANTIGEN D): NORMAL
SAO2 % BLDV: 87 % (ref 45–75)
SODIUM BLDV-SCNC: 138 MMOL/L (ref 136–145)
SODIUM SERPL-SCNC: 141 MMOL/L (ref 136–145)
SODIUM SERPL-SCNC: 141 MMOL/L (ref 136–145)
T AXIS: 223 DEGREES
T OFFSET: 414 MS
VENTRICULAR RATE: 81 BPM
WBC # BLD AUTO: 5 X10*3/UL (ref 4.4–11.3)
WBC # BLD AUTO: 5.8 X10*3/UL (ref 4.4–11.3)

## 2024-08-22 PROCEDURE — 8010000001 HC DIALYSIS - HEMODIALYSIS PER DAY

## 2024-08-22 PROCEDURE — 2500000001 HC RX 250 WO HCPCS SELF ADMINISTERED DRUGS (ALT 637 FOR MEDICARE OP): Performed by: STUDENT IN AN ORGANIZED HEALTH CARE EDUCATION/TRAINING PROGRAM

## 2024-08-22 PROCEDURE — 1210000001 HC SEMI-PRIVATE ROOM DAILY

## 2024-08-22 PROCEDURE — 90935 HEMODIALYSIS ONE EVALUATION: CPT | Performed by: INTERNAL MEDICINE

## 2024-08-22 PROCEDURE — 84132 ASSAY OF SERUM POTASSIUM: CPT

## 2024-08-22 PROCEDURE — 5A1D70Z PERFORMANCE OF URINARY FILTRATION, INTERMITTENT, LESS THAN 6 HOURS PER DAY: ICD-10-PCS | Performed by: STUDENT IN AN ORGANIZED HEALTH CARE EDUCATION/TRAINING PROGRAM

## 2024-08-22 PROCEDURE — 2500000004 HC RX 250 GENERAL PHARMACY W/ HCPCS (ALT 636 FOR OP/ED): Performed by: INTERNAL MEDICINE

## 2024-08-22 PROCEDURE — 2500000004 HC RX 250 GENERAL PHARMACY W/ HCPCS (ALT 636 FOR OP/ED): Performed by: STUDENT IN AN ORGANIZED HEALTH CARE EDUCATION/TRAINING PROGRAM

## 2024-08-22 PROCEDURE — 99221 1ST HOSP IP/OBS SF/LOW 40: CPT | Performed by: INTERNAL MEDICINE

## 2024-08-22 PROCEDURE — 96376 TX/PRO/DX INJ SAME DRUG ADON: CPT

## 2024-08-22 PROCEDURE — 84484 ASSAY OF TROPONIN QUANT: CPT | Performed by: STUDENT IN AN ORGANIZED HEALTH CARE EDUCATION/TRAINING PROGRAM

## 2024-08-22 PROCEDURE — 2500000005 HC RX 250 GENERAL PHARMACY W/O HCPCS: Performed by: STUDENT IN AN ORGANIZED HEALTH CARE EDUCATION/TRAINING PROGRAM

## 2024-08-22 PROCEDURE — 2500000004 HC RX 250 GENERAL PHARMACY W/ HCPCS (ALT 636 FOR OP/ED): Mod: JG

## 2024-08-22 PROCEDURE — 2500000004 HC RX 250 GENERAL PHARMACY W/ HCPCS (ALT 636 FOR OP/ED): Performed by: NURSE PRACTITIONER

## 2024-08-22 PROCEDURE — 36415 COLL VENOUS BLD VENIPUNCTURE: CPT

## 2024-08-22 PROCEDURE — 99223 1ST HOSP IP/OBS HIGH 75: CPT

## 2024-08-22 PROCEDURE — 2500000001 HC RX 250 WO HCPCS SELF ADMINISTERED DRUGS (ALT 637 FOR MEDICARE OP)

## 2024-08-22 PROCEDURE — 71045 X-RAY EXAM CHEST 1 VIEW: CPT | Performed by: RADIOLOGY

## 2024-08-22 PROCEDURE — 85025 COMPLETE CBC W/AUTO DIFF WBC: CPT

## 2024-08-22 PROCEDURE — 96374 THER/PROPH/DIAG INJ IV PUSH: CPT

## 2024-08-22 PROCEDURE — 96375 TX/PRO/DX INJ NEW DRUG ADDON: CPT

## 2024-08-22 PROCEDURE — 99291 CRITICAL CARE FIRST HOUR: CPT | Performed by: STUDENT IN AN ORGANIZED HEALTH CARE EDUCATION/TRAINING PROGRAM

## 2024-08-22 PROCEDURE — 83735 ASSAY OF MAGNESIUM: CPT

## 2024-08-22 PROCEDURE — 93005 ELECTROCARDIOGRAM TRACING: CPT

## 2024-08-22 RX ORDER — PARICALCITOL 5 UG/ML
1 INJECTION, SOLUTION INTRAVENOUS
Status: DISCONTINUED | OUTPATIENT
Start: 2024-08-22 | End: 2024-08-25 | Stop reason: HOSPADM

## 2024-08-22 RX ORDER — ACETAMINOPHEN 160 MG/5ML
650 SOLUTION ORAL EVERY 4 HOURS PRN
Status: DISCONTINUED | OUTPATIENT
Start: 2024-08-22 | End: 2024-08-25 | Stop reason: HOSPADM

## 2024-08-22 RX ORDER — ACETAMINOPHEN 325 MG/1
975 TABLET ORAL ONCE
Status: COMPLETED | OUTPATIENT
Start: 2024-08-22 | End: 2024-08-22

## 2024-08-22 RX ORDER — ATORVASTATIN CALCIUM 80 MG/1
80 TABLET, FILM COATED ORAL NIGHTLY
Status: DISCONTINUED | OUTPATIENT
Start: 2024-08-22 | End: 2024-08-25 | Stop reason: HOSPADM

## 2024-08-22 RX ORDER — CARVEDILOL 25 MG/1
50 TABLET ORAL 2 TIMES DAILY
Status: DISCONTINUED | OUTPATIENT
Start: 2024-08-22 | End: 2024-08-25 | Stop reason: HOSPADM

## 2024-08-22 RX ORDER — CLONIDINE HYDROCHLORIDE 0.1 MG/1
0.2 TABLET ORAL ONCE
Status: COMPLETED | OUTPATIENT
Start: 2024-08-22 | End: 2024-08-22

## 2024-08-22 RX ORDER — CLONIDINE HYDROCHLORIDE 0.1 MG/1
0.2 TABLET ORAL 3 TIMES DAILY
Status: DISCONTINUED | OUTPATIENT
Start: 2024-08-22 | End: 2024-08-25 | Stop reason: HOSPADM

## 2024-08-22 RX ORDER — HYDRALAZINE HYDROCHLORIDE 25 MG/1
100 TABLET, FILM COATED ORAL 3 TIMES DAILY
Status: DISCONTINUED | OUTPATIENT
Start: 2024-08-22 | End: 2024-08-25 | Stop reason: HOSPADM

## 2024-08-22 RX ORDER — LABETALOL HYDROCHLORIDE 5 MG/ML
40 INJECTION, SOLUTION INTRAVENOUS ONCE
Status: COMPLETED | OUTPATIENT
Start: 2024-08-22 | End: 2024-08-22

## 2024-08-22 RX ORDER — HYDRALAZINE HYDROCHLORIDE 25 MG/1
100 TABLET, FILM COATED ORAL ONCE
Status: COMPLETED | OUTPATIENT
Start: 2024-08-22 | End: 2024-08-22

## 2024-08-22 RX ORDER — FLUTICASONE FUROATE AND VILANTEROL 100; 25 UG/1; UG/1
1 POWDER RESPIRATORY (INHALATION)
Status: DISCONTINUED | OUTPATIENT
Start: 2024-08-22 | End: 2024-08-25 | Stop reason: HOSPADM

## 2024-08-22 RX ORDER — VALSARTAN 320 MG/1
320 TABLET ORAL DAILY
Status: DISCONTINUED | OUTPATIENT
Start: 2024-08-22 | End: 2024-08-25 | Stop reason: HOSPADM

## 2024-08-22 RX ORDER — NIFEDIPINE 90 MG/1
90 TABLET, EXTENDED RELEASE ORAL
Status: DISCONTINUED | OUTPATIENT
Start: 2024-08-23 | End: 2024-08-25 | Stop reason: HOSPADM

## 2024-08-22 RX ORDER — ASPIRIN 81 MG/1
81 TABLET ORAL DAILY
Status: DISCONTINUED | OUTPATIENT
Start: 2024-08-22 | End: 2024-08-25 | Stop reason: HOSPADM

## 2024-08-22 RX ORDER — ACETAMINOPHEN 325 MG/1
650 TABLET ORAL EVERY 4 HOURS PRN
Status: DISCONTINUED | OUTPATIENT
Start: 2024-08-22 | End: 2024-08-25 | Stop reason: HOSPADM

## 2024-08-22 RX ORDER — CLONIDINE HYDROCHLORIDE 0.1 MG/1
0.2 TABLET ORAL 3 TIMES DAILY
Status: DISCONTINUED | OUTPATIENT
Start: 2024-08-22 | End: 2024-08-22

## 2024-08-22 RX ORDER — TRAMADOL HYDROCHLORIDE 50 MG/1
50 TABLET ORAL ONCE
Status: COMPLETED | OUTPATIENT
Start: 2024-08-22 | End: 2024-08-22

## 2024-08-22 RX ORDER — CALCIUM ACETATE 667 MG/1
1334 CAPSULE ORAL
Status: DISCONTINUED | OUTPATIENT
Start: 2024-08-22 | End: 2024-08-25 | Stop reason: HOSPADM

## 2024-08-22 RX ORDER — ACETAMINOPHEN 650 MG/1
650 SUPPOSITORY RECTAL EVERY 4 HOURS PRN
Status: DISCONTINUED | OUTPATIENT
Start: 2024-08-22 | End: 2024-08-25 | Stop reason: HOSPADM

## 2024-08-22 RX ORDER — HYDRALAZINE HYDROCHLORIDE 20 MG/ML
10 INJECTION INTRAMUSCULAR; INTRAVENOUS ONCE
Status: COMPLETED | OUTPATIENT
Start: 2024-08-22 | End: 2024-08-22

## 2024-08-22 RX ORDER — ACETAMINOPHEN 325 MG/1
650 TABLET ORAL ONCE
Status: COMPLETED | OUTPATIENT
Start: 2024-08-22 | End: 2024-08-22

## 2024-08-22 RX ORDER — LABETALOL HYDROCHLORIDE 5 MG/ML
20 INJECTION, SOLUTION INTRAVENOUS ONCE
Status: COMPLETED | OUTPATIENT
Start: 2024-08-22 | End: 2024-08-22

## 2024-08-22 RX ORDER — GABAPENTIN 300 MG/1
300 CAPSULE ORAL NIGHTLY
Status: DISCONTINUED | OUTPATIENT
Start: 2024-08-22 | End: 2024-08-25 | Stop reason: HOSPADM

## 2024-08-22 RX ORDER — ONDANSETRON HYDROCHLORIDE 2 MG/ML
4 INJECTION, SOLUTION INTRAVENOUS ONCE
Status: COMPLETED | OUTPATIENT
Start: 2024-08-22 | End: 2024-08-22

## 2024-08-22 RX ORDER — ISOSORBIDE MONONITRATE 60 MG/1
120 TABLET, EXTENDED RELEASE ORAL DAILY
Status: DISCONTINUED | OUTPATIENT
Start: 2024-08-22 | End: 2024-08-25 | Stop reason: HOSPADM

## 2024-08-22 ASSESSMENT — PAIN DESCRIPTION - LOCATION: LOCATION: HEAD

## 2024-08-22 ASSESSMENT — PAIN - FUNCTIONAL ASSESSMENT
PAIN_FUNCTIONAL_ASSESSMENT: 0-10
PAIN_FUNCTIONAL_ASSESSMENT: 0-10

## 2024-08-22 ASSESSMENT — PAIN SCALES - GENERAL
PAINLEVEL_OUTOF10: 7
PAINLEVEL_OUTOF10: 5 - MODERATE PAIN
PAINLEVEL_OUTOF10: 0 - NO PAIN

## 2024-08-22 ASSESSMENT — PAIN DESCRIPTION - PROGRESSION
CLINICAL_PROGRESSION: OTHER (COMMENT)
CLINICAL_PROGRESSION: GRADUALLY IMPROVING

## 2024-08-22 NOTE — ED PROCEDURE NOTE
Procedure  Critical Care    Performed by: Thuy Cobian MD  Authorized by: Thuy Cobian MD    Critical care provider statement:     Critical care time (minutes):  35    Critical care time was exclusive of:  Separately billable procedures and treating other patients and teaching time    Critical care was necessary to treat or prevent imminent or life-threatening deterioration of the following conditions:  Respiratory failure    Critical care was time spent personally by me on the following activities:  Blood draw for specimens, development of treatment plan with patient or surrogate, ordering and performing treatments and interventions, ordering and review of laboratory studies, ordering and review of radiographic studies, pulse oximetry, re-evaluation of patient's condition, review of old charts, vascular access procedures, examination of patient and evaluation of patient's response to treatment               Thuy Cobian MD  08/22/24 0532

## 2024-08-22 NOTE — H&P
"History Of Present Illness  Stacey Heath is a 54 y.o. female PMHx of ESRD 2/2 HTN and diabetes (on HD T/Th/Sat via RUE AVF), poorly controlled hypertension, HFpEF, T2DM, ?COPD, brachial DVT on Eliquis (4/14/2024) who presented to ED with shortness of breath. Of note, was recently admitted 8/10-8/12 for pulmonary edema and hypertensive emergency. Initially required CPAP in the ED and then weaned to NC. Given her home meds and had urgent HD with improvement in BP, was discharged on 8/12.     On examination in the emergency department, patient states she onset with shortness of breath last night and presented to the emergency department.  Otherwise denies cough, fevers, chills.  Notes neck pain since having central line placed in the ED.  Also reports abdominal pain, denies nausea, vomiting, diarrhea.  States that she takes all of her blood pressure medications as prescribed.  Cannot tell me what medications she takes.  When asked if she would recognize the names of her medications if told her she states \"no, I take too many medications to know\".  Endorses dialysis session 2 days ago follows TTS schedule.  Recognizes frequent admissions for hypertensive urgency/emergency.    ED Course:     Patient Vitals for the past 24 hrs:   BP Temp Temp src Pulse Resp SpO2 Height Weight   08/22/24 0700 165/71 -- -- 73 18 99 % -- --   08/22/24 0645 165/67 -- -- 77 15 98 % -- --   08/22/24 0630 150/65 -- -- 76 14 99 % -- --   08/22/24 0615 160/64 -- -- 79 14 98 % -- --   08/22/24 0600 173/74 -- -- 77 15 99 % -- --   08/22/24 0545 (!) 200/80 -- -- 76 15 100 % -- --   08/22/24 0530 (!) 215/80 -- -- 77 16 100 % -- --   08/22/24 0515 (!) 218/85 -- -- 80 18 100 % -- --   08/22/24 0500 (!) 234/88 -- -- 84 11 100 % -- --   08/22/24 0415 (!) 251/108 -- -- 86 15 100 % -- --   08/22/24 0345 (!) 257/110 -- -- 98 12 100 % -- --   08/22/24 0340 (!) 246/101 -- -- 93 (!) 26 100 % -- --   08/22/24 0316 (!) 257/100 -- -- 96 -- (!) 93 % -- -- " "  08/22/24 0206 -- -- -- -- 18 -- -- --   08/22/24 0130 (!) 260/103 -- -- 82 14 100 % -- --   08/22/24 0125 (!) 252/106 -- -- 81 -- 100 % -- --   08/22/24 0120 (!) 248/103 -- -- 83 15 100 % -- --   08/22/24 0115 (!) 247/113 -- -- 85 19 100 % -- --   08/22/24 0110 (!) 245/121 -- -- 85 -- 100 % -- --   08/22/24 0105 (!) 252/107 -- -- 80 -- 100 % -- --   08/22/24 0100 (!) 260/108 -- -- 81 -- 100 % -- --   08/22/24 0055 (!) 251/104 -- -- 87 15 100 % -- --   08/22/24 0050 (!) 251/102 -- -- 85 20 100 % -- --   08/22/24 0045 (!) 239/99 -- -- 86 17 100 % -- --   08/22/24 0040 (!) 229/99 -- -- 87 13 100 % -- --   08/22/24 0035 (!) 233/99 -- -- 86 (!) 9 100 % -- --   08/22/24 0030 (!) 244/100 -- -- 86 12 100 % -- --   08/22/24 0025 (!) 235/103 -- -- 87 11 100 % -- --   08/22/24 0020 (!) 240/106 -- -- 84 (!) 0 100 % -- --   08/22/24 0015 -- -- -- 87 (!) 5 100 % -- --   08/22/24 0010 -- -- -- 93 (!) 4 100 % -- --   08/22/24 0005 (!) 249/107 -- -- 87 15 100 % -- --   08/22/24 0000 (!) 234/121 -- -- 91 10 100 % -- --   08/21/24 2355 (!) 244/109 -- -- 90 20 100 % -- --   08/21/24 2351 (!) 242/97 -- -- 95 (!) 22 100 % -- --   08/21/24 2350 (!) 242/97 -- -- 92 (!) 22 100 % -- --   08/21/24 2345 -- -- -- 98 19 100 % -- --   08/21/24 2340 -- -- -- (!) 101 (!) 23 100 % -- --   08/21/24 2335 -- -- -- (!) 108 (!) 33 (!) 93 % -- --   08/21/24 2334 -- -- -- -- (!) 23 95 % -- --   08/21/24 2332 (!) 189/98 36.5 °C (97.7 °F) Temporal (!) 109 (!) 28 (!) 93 % 1.397 m (4' 7\") 54 kg (119 lb)     Results for orders placed or performed during the hospital encounter of 08/21/24 (from the past 24 hour(s))   Blood Gas Venous Full Panel   Result Value Ref Range    POCT pH, Venous 7.38 7.33 - 7.43 pH    POCT pCO2, Venous 49 41 - 51 mm Hg    POCT pO2, Venous 32 (L) 35 - 45 mm Hg    POCT SO2, Venous 38 (L) 45 - 75 %    POCT Oxy Hemoglobin, Venous 37.6 (L) 45.0 - 75.0 %    POCT Hematocrit Calculated, Venous 36.0 36.0 - 46.0 %    POCT Sodium, Venous 137 136 " - 145 mmol/L    POCT Potassium, Venous 5.3 3.5 - 5.3 mmol/L    POCT Chloride, Venous 99 98 - 107 mmol/L    POCT Ionized Calicum, Venous 1.10 1.10 - 1.33 mmol/L    POCT Glucose, Venous 90 74 - 99 mg/dL    POCT Lactate, Venous 0.9 0.4 - 2.0 mmol/L    POCT Base Excess, Venous 3.1 (H) -2.0 - 3.0 mmol/L    POCT HCO3 Calculated, Venous 29.0 (H) 22.0 - 26.0 mmol/L    POCT Hemoglobin, Venous 12.1 12.0 - 16.0 g/dL    POCT Anion Gap, Venous 14.0 10.0 - 25.0 mmol/L    Patient Temperature 37.0 degrees Celsius    FiO2 35 %   CBC and Auto Differential   Result Value Ref Range    WBC 5.0 4.4 - 11.3 x10*3/uL    nRBC 0.0 0.0 - 0.0 /100 WBCs    RBC 4.19 4.00 - 5.20 x10*6/uL    Hemoglobin 11.9 (L) 12.0 - 16.0 g/dL    Hematocrit 36.9 36.0 - 46.0 %    MCV 88 80 - 100 fL    MCH 28.4 26.0 - 34.0 pg    MCHC 32.2 32.0 - 36.0 g/dL    RDW 16.1 (H) 11.5 - 14.5 %    Platelets 227 150 - 450 x10*3/uL    Neutrophils % 54.3 40.0 - 80.0 %    Immature Granulocytes %, Automated 0.2 0.0 - 0.9 %    Lymphocytes % 25.9 13.0 - 44.0 %    Monocytes % 6.4 2.0 - 10.0 %    Eosinophils % 11.8 0.0 - 6.0 %    Basophils % 1.4 0.0 - 2.0 %    Neutrophils Absolute 2.73 1.20 - 7.70 x10*3/uL    Immature Granulocytes Absolute, Automated 0.01 0.00 - 0.70 x10*3/uL    Lymphocytes Absolute 1.30 1.20 - 4.80 x10*3/uL    Monocytes Absolute 0.32 0.10 - 1.00 x10*3/uL    Eosinophils Absolute 0.59 0.00 - 0.70 x10*3/uL    Basophils Absolute 0.07 0.00 - 0.10 x10*3/uL   Phosphorus   Result Value Ref Range    Phosphorus 9.6 (H) 2.5 - 4.9 mg/dL   Magnesium   Result Value Ref Range    Magnesium 2.57 (H) 1.60 - 2.40 mg/dL   Comprehensive metabolic panel   Result Value Ref Range    Glucose 79 74 - 99 mg/dL    Sodium 141 136 - 145 mmol/L    Potassium 5.0 3.5 - 5.3 mmol/L    Chloride 97 (L) 98 - 107 mmol/L    Bicarbonate 25 21 - 32 mmol/L    Anion Gap 24 (H) 10 - 20 mmol/L    Urea Nitrogen 61 (H) 6 - 23 mg/dL    Creatinine 10.09 (H) 0.50 - 1.05 mg/dL    eGFR 4 (L) >60 mL/min/1.73m*2     Calcium 9.7 8.6 - 10.6 mg/dL    Albumin 4.6 3.4 - 5.0 g/dL    Alkaline Phosphatase 125 (H) 33 - 110 U/L    Total Protein 8.1 6.4 - 8.2 g/dL    AST 23 9 - 39 U/L    Bilirubin, Total 0.4 0.0 - 1.2 mg/dL    ALT 10 7 - 45 U/L   B-Type Natriuretic Peptide   Result Value Ref Range    BNP 1,532 (H) 0 - 99 pg/mL   Type And Screen   Result Value Ref Range    ABO TYPE AB     Rh TYPE NEG     ANTIBODY SCREEN NEG    Troponin I, High Sensitivity, Initial   Result Value Ref Range    Troponin I, High Sensitivity ([facility]) 55 (H) 0 - 34 ng/L   Troponin, High Sensitivity, 1 Hour   Result Value Ref Range    Troponin I, High Sensitivity ([facility]) 61 (H) 0 - 34 ng/L   CBC and Auto Differential   Result Value Ref Range    WBC 5.8 4.4 - 11.3 x10*3/uL    nRBC 0.0 0.0 - 0.0 /100 WBCs    RBC 3.61 (L) 4.00 - 5.20 x10*6/uL    Hemoglobin 10.3 (L) 12.0 - 16.0 g/dL    Hematocrit 31.6 (L) 36.0 - 46.0 %    MCV 88 80 - 100 fL    MCH 28.5 26.0 - 34.0 pg    MCHC 32.6 32.0 - 36.0 g/dL    RDW 16.0 (H) 11.5 - 14.5 %    Platelets 200 150 - 450 x10*3/uL    Neutrophils % 62.9 40.0 - 80.0 %    Immature Granulocytes %, Automated 0.2 0.0 - 0.9 %    Lymphocytes % 18.5 13.0 - 44.0 %    Monocytes % 8.1 2.0 - 10.0 %    Eosinophils % 9.4 0.0 - 6.0 %    Basophils % 0.9 0.0 - 2.0 %    Neutrophils Absolute 3.67 1.20 - 7.70 x10*3/uL    Immature Granulocytes Absolute, Automated 0.01 0.00 - 0.70 x10*3/uL    Lymphocytes Absolute 1.08 (L) 1.20 - 4.80 x10*3/uL    Monocytes Absolute 0.47 0.10 - 1.00 x10*3/uL    Eosinophils Absolute 0.55 0.00 - 0.70 x10*3/uL    Basophils Absolute 0.05 0.00 - 0.10 x10*3/uL   Renal function panel   Result Value Ref Range    Glucose 66 (L) 74 - 99 mg/dL    Sodium 141 136 - 145 mmol/L    Potassium 4.5 3.5 - 5.3 mmol/L    Chloride 100 98 - 107 mmol/L    Bicarbonate 22 21 - 32 mmol/L    Anion Gap 24 (H) 10 - 20 mmol/L    Urea Nitrogen 61 (H) 6 - 23 mg/dL    Creatinine 10.18 (H) 0.50 - 1.05 mg/dL    eGFR 4 (L) >60 mL/min/1.73m*2    Calcium 9.4 8.6 - 10.6  mg/dL    Phosphorus 9.5 (H) 2.5 - 4.9 mg/dL    Albumin 4.1 3.4 - 5.0 g/dL   Magnesium   Result Value Ref Range    Magnesium 2.45 (H) 1.60 - 2.40 mg/dL   Blood Gas Venous Full Panel   Result Value Ref Range    POCT pH, Venous 7.39 7.33 - 7.43 pH    POCT pCO2, Venous 39 (L) 41 - 51 mm Hg    POCT pO2, Venous 60 (H) 35 - 45 mm Hg    POCT SO2, Venous 87 (H) 45 - 75 %    POCT Oxy Hemoglobin, Venous 86.0 (H) 45.0 - 75.0 %    POCT Hematocrit Calculated, Venous 32.0 (L) 36.0 - 46.0 %    POCT Sodium, Venous 138 136 - 145 mmol/L    POCT Potassium, Venous 4.6 3.5 - 5.3 mmol/L    POCT Chloride, Venous 101 98 - 107 mmol/L    POCT Ionized Calicum, Venous 1.11 1.10 - 1.33 mmol/L    POCT Glucose, Venous 77 74 - 99 mg/dL    POCT Lactate, Venous 0.6 0.4 - 2.0 mmol/L    POCT Base Excess, Venous -1.2 -2.0 - 3.0 mmol/L    POCT HCO3 Calculated, Venous 23.6 22.0 - 26.0 mmol/L    POCT Hemoglobin, Venous 10.6 (L) 12.0 - 16.0 g/dL    POCT Anion Gap, Venous 18.0 10.0 - 25.0 mmol/L    Patient Temperature 37.0 degrees Celsius    FiO2 21 %     Medications   oxygen (O2) therapy (has no administration in time range)   nitroglycerin (Nitrostat) SL tablet 0.4 mg (0.4 mg sublingual Given 8/21/24 0746)   furosemide (Lasix) injection 40 mg (40 mg intravenous Given 8/22/24 0317)   ipratropium-albuteroL (Duo-Neb) 0.5-2.5 mg/3 mL nebulizer solution 3 mL (3 mL nebulization Given 8/21/24 7188)   lidocaine (Xylocaine) 20 mg/mL (2 %) injection 10 mL (10 mL subcutaneous Given 8/22/24 0039)   nitroglycerin (Nitrostat) SL tablet 0.4 mg (0.4 mg sublingual Given 8/21/24 6765)   labetaloL (Normodyne,Trandate) injection 20 mg (20 mg intravenous Given 8/22/24 1764)   labetaloL (Normodyne,Trandate) injection 40 mg (40 mg intravenous Given 8/22/24 0448)   cloNIDine (Catapres) tablet 0.2 mg (0.2 mg oral Given 8/22/24 0446)   hydrALAZINE (Apresoline) tablet 100 mg (100 mg oral Given 8/22/24 0446)   acetaminophen (Tylenol) tablet 975 mg (975 mg oral Given 8/22/24 0446)    hydrALAZINE (Apresoline) injection 10 mg (10 mg intravenous Given 8/22/24 0539)      Past Medical History  She has a past medical history of Bacteremia due to methicillin resistant Staphylococcus aureus (07/08/2024), Cardiac/pericardial tamponade ([facility]) (01/20/2024), CHF (congestive heart failure) (Multi), COPD (chronic obstructive pulmonary disease) (Multi), Coronary artery disease, Disorder of sweat glands (02/25/2023), Dry eye syndrome of bilateral lacrimal glands (03/07/2017), ESRD (end stage renal disease) (Multi), Essential (primary) hypertension (12/27/2022), History of acute pancreatitis (12/21/2020), Low grade squamous intraepithelial lesion (LGSIL) on cervicovaginal cytologic smear (02/25/2023), Migraines, 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 (02/25/2023), and Type 2 myocardial infarction (Multi) (01/20/2024).    Surgical History  She has a past surgical history that includes Tubal ligation (03/07/2017); Other surgical history (03/07/2017); Appendectomy (03/07/2017); Other surgical history (03/07/2017); Other surgical history (12/08/2021); Other surgical history (12/08/2021); and CT angio abdomen w and or wo IV IV contrast (4/23/2023).     Social History  She reports that she has quit smoking. Her smoking use included cigarettes. She has never used smokeless tobacco. She reports that she does not currently use alcohol. She reports that she does not currently use drugs after having used the following drugs: Cocaine and Marijuana.    Family History  Family History   Problem Relation Name Age of Onset    Other (Cerebrovascular Accident) Father      Heart failure Paternal Grandmother      Breast cancer Paternal Grandmother      Other (Primary Cervical Cancer) Paternal Grandmother      Diabetes Paternal Grandfather      Breast cancer Father's Sister      Ovarian cancer Father's Sister       Allergies  Iodine, Bioflavonoids, Codeine, Flowers, Hydromorphone, and Shellfish containing products    Review of  Systems  ROS as per HPI, otherwise reviewed and negative.     Physical Exam  General: curled up in bed, covered in blankets  HEENT: wearing hair wrap  CV: heart regular rate and rhythm w/o murmurs  Resp: lungs clear to auscultation posteriorly, wearing 2L NC, breathing comfortably   Abd: soft, nondistended, nontender  Ext: no peripheral edema  Neuro: grossly nonfocal, speech clear  Psych: appropriate affect     Last Recorded Vitals  /71   Pulse 73   Temp 36.5 °C (97.7 °F) (Temporal) Comment: Simultaneous filing. User may not have seen previous data. Comment (Src): Simultaneous filing. User may not have seen previous data.  Resp 18   Wt 54 kg (119 lb)   SpO2 99%        Assessment/Plan   Assessment & Plan  Hypertensive emergency    Stacey Heath is a 54 y.o. 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  Patient received multiple blood pressure medications via IV as well as p.o. and blood pressure is now 160s to 170s systolic.  Nephrology notified to schedule dialysis per regular schedule.  Following dialysis will slowly restart home blood pressure medications.    #hypertensive emergency  :: home BP regimen: carvedilol 50 BID, valsartan 320, nifedipine 90, isosorbide mononitrate 120, hydral 100 TID, clonidine 0.2mg TID   - BP has been adequately reduced by around 20% on admission, will restart above medications after HD so as not to drop BP excessively within 24 hours     #ESRD on HD  - Nephrology consulted for HD per regular TTS schedule    #hx of brachial DVT  - continue home Eliquis 5 BID     #acute hypoxemic respiratory failure   :: on 2L NC on admission   - likely 2/2 hypertension   - wean as able   - no  evidence of concomitant infection such as pna     F: prn   E: prn   N: renal diet  GI: none  DVT: home apixaban     CODE STATUS: full code (confirmed on admission)   NOK: Diya Jang (daughter) [phone]       Nitish Abbott MD

## 2024-08-22 NOTE — ED TRIAGE NOTES
Pt to the ED for shortness of breath onset 30 mins prior to arrival. Pt received full dialysis treatment Tues. Additional complaints of headache and nausea. PT reports that she has taken all her medications prior to arrival.

## 2024-08-22 NOTE — PROGRESS NOTES
Stacey Heath is a 54 y.o. female on day 0 of admission presenting with Hypertensive emergency.    NEPHROLOGY NEW CONSULT NOTE   Patient ID: Stacey Heath is a 54 y.o. female.     Reason for consult: ESRD-HD    Chief Complaint   Patient presents with    Shortness of Breath        HPI  Stacey Heath is a 54 y.o. female  Admitted for HTN emergency  - Nephrology was consulted for ESRD management   HD TTS Rt avf. Last HD Tuesday.   Unit- [facility]- Dr Barraza.   Pt is well known to the HD unit.     Past Medical History:   Diagnosis Date    Bacteremia due to methicillin resistant Staphylococcus aureus 07/08/2024    Cardiac/pericardial tamponade ([facility]) 01/20/2024    CHF (congestive heart failure) (Multi)     COPD (chronic obstructive pulmonary disease) (Multi)     Coronary artery disease     Disorder of sweat glands 02/25/2023    Dry eye syndrome of bilateral lacrimal glands 03/07/2017    Dry eyes    ESRD (end stage renal disease) (Multi)     Essential (primary) hypertension 12/27/2022    Hypertension    History of acute pancreatitis 12/21/2020    History of acute pancreatitis    Low grade squamous intraepithelial lesion (LGSIL) on cervicovaginal cytologic smear 02/25/2023    Migraines     History of migraine    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    Type 2 myocardial infarction (Multi) 01/20/2024      Past Surgical History:   Procedure Laterality Date    APPENDECTOMY  03/07/2017    Appendectomy    CT ABDOMEN ANGIOGRAM W AND/OR WO IV CONTRAST  4/23/2023    CT ABDOMEN ANGIOGRAM W AND/OR WO IV CONTRAST [facility] CT    OTHER SURGICAL HISTORY  03/07/2017    Cystoscopy With Pyeloscopy With Removal Of Calculus    OTHER SURGICAL HISTORY  03/07/2017    Anoscopy For Polyp Removal    OTHER SURGICAL HISTORY  12/08/2021    Arteriovenous fistula creation procedure    OTHER SURGICAL HISTORY  12/08/2021    Dialysis tunneled catheter placement    TUBAL LIGATION  03/07/2017    Tubal Ligation      Family History   Problem  Relation Name Age of Onset    Other (Cerebrovascular Accident) Father      Heart failure Paternal Grandmother      Breast cancer Paternal Grandmother      Other (Primary Cervical Cancer) Paternal Grandmother      Diabetes Paternal Grandfather      Breast cancer Father's Sister      Ovarian cancer Father's Sister       Social History     Socioeconomic History    Marital status: Single     Spouse name: Not on file    Number of children: Not on file    Years of education: Not on file    Highest education level: Not on file   Occupational History    Not on file   Tobacco Use    Smoking status: Former     Types: Cigarettes    Smokeless tobacco: Never   Vaping Use    Vaping status: Never Used   Substance and Sexual Activity    Alcohol use: Not Currently    Drug use: Not Currently     Types: Cocaine, Marijuana    Sexual activity: Defer   Other Topics Concern    Not on file   Social History Narrative    Not on file     Social Determinants of Health     Financial Resource Strain: Low Risk  (8/12/2024)    Overall Financial Resource Strain (CARDIA)     Difficulty of Paying Living Expenses: Not hard at all   Recent Concern: Financial Resource Strain - High Risk (5/29/2024)    Overall Financial Resource Strain (CARDIA)     Difficulty of Paying Living Expenses: Very hard   Food Insecurity: No Food Insecurity (7/15/2024)    Hunger Vital Sign     Worried About Running Out of Food in the Last Year: Never true     Ran Out of Food in the Last Year: Never true   Transportation Needs: No Transportation Needs (8/12/2024)    PRAPARE - Transportation     Lack of Transportation (Medical): No     Lack of Transportation (Non-Medical): No   Physical Activity: Sufficiently Active (7/22/2024)    Exercise Vital Sign     Days of Exercise per Week: 4 days     Minutes of Exercise per Session: 60 min   Stress: No Stress Concern Present (7/15/2024)    [employer] - Occupational Stress Questionnaire     Feeling of Stress :  Only a little   Social Connections: Moderately Integrated (7/15/2024)    Social Connection and Isolation Panel [NHANES]     Frequency of Communication with Friends and Family: More than three times a week     Frequency of Social Gatherings with Friends and Family: Once a week     Attends [employer] Services: More than 4 times per year     Active Member of Clubs or Organizations: No     Attends Club or Organization Meetings: Not on file     Marital Status: Living with partner   Intimate Partner Violence: Not At Risk (7/15/2024)    Humiliation, Afraid, Rape, and Kick questionnaire     Fear of Current or Ex-Partner: No     Emotionally Abused: No     Physically Abused: No     Sexually Abused: No   Housing Stability: Low Risk  (8/12/2024)    Housing Stability Vital Sign     Unable to Pay for Housing in the Last Year: No     Number of Times Moved in the Last Year: 1     Homeless in the Last Year: No     Allergies   Allergen Reactions    Iodine Hives, Itching and Unknown    Bioflavonoids Swelling    Codeine Itching, Hives and Unknown     Tolerates percocet   Tolerates percocet    Tolerates percocet    Flowers Itching    Hydromorphone Itching    Shellfish Containing Products Swelling     SEAFOOD        Scheduled medications  acetaminophen, 975 mg, oral, Once  apixaban, 5 mg, oral, BID  aspirin, 81 mg, oral, Daily  atorvastatin, 80 mg, oral, Nightly  B complex-vitamin C-folic acid, 1 capsule, oral, Daily  calcium acetate, 1,334 mg, oral, TID  carvedilol, 50 mg, oral, BID  cloNIDine, 0.2 mg, oral, TID  fluticasone furoate-vilanteroL, 1 puff, inhalation, Daily  gabapentin, 300 mg, oral, Nightly  [Held by provider] hydrALAZINE, 100 mg, oral, TID  [Held by provider] isosorbide mononitrate ER, 120 mg, oral, Daily  [START ON 8/23/2024] NIFEdipine ER, 90 mg, oral, Daily before breakfast  paricalcitol, 1 mcg, intravenous, After Dialysis  [Held by provider] valsartan, 320 mg, oral, Daily      Continuous medications     PRN  "medications  PRN medications: oxygen   Meds:   acetaminophen, 975 mg, Once  apixaban, 5 mg, BID  aspirin, 81 mg, Daily  atorvastatin, 80 mg, Nightly  B complex-vitamin C-folic acid, 1 capsule, Daily  calcium acetate, 1,334 mg, TID  carvedilol, 50 mg, BID  cloNIDine, 0.2 mg, TID  fluticasone furoate-vilanteroL, 1 puff, Daily  gabapentin, 300 mg, Nightly  [Held by provider] hydrALAZINE, 100 mg, TID  [Held by provider] isosorbide mononitrate ER, 120 mg, Daily  [START ON 8/23/2024] NIFEdipine ER, 90 mg, Daily before breakfast  paricalcitol, 1 mcg, After Dialysis  [Held by provider] valsartan, 320 mg, Daily         oxygen, , Continuous PRN - O2/gases        Heart Rate:  []   Temperature:  [36.5 °C (97.7 °F)]   Respirations:  [0-33]   BP: (150-260)/()   Height:  [139.7 cm (4' 7\")]   Weight:  [54 kg (119 lb)]   Pulse Ox:  [93 %-100 %]    Weight: 54 kg (119 lb)   General appearance: Awake and alert, oriented, . No distress  HEENT: supple, moist oral mucosa, no mouth ulcers  Neck: No JVD  Skin: no apparent rash  Heart: heart sounds 1 & 2 present and normal, no murmurs heard or friction rub  Lungs: Adequate air entry,  no wheezing/crackles  Abdomen: soft, non tender, no masses palpated, no flank tenderness  Extremities: No  edema, no joint swelling,  : deferred  Neuro: No FND, no asterixis   ACCESS: RT IJ TC        Results from last 72 hours   Lab Units 08/22/24  0331   SODIUM mmol/L 141   POTASSIUM mmol/L 4.5   CO2 mmol/L 22   BUN mg/dL 61*   CREATININE mg/dL 10.18*   PHOSPHORUS mg/dL 9.5*   CALCIUM mg/dL 9.4   ALBUMIN g/dL 4.1   GLUCOSE mg/dL 66*   WBC AUTO x10*3/uL 5.8        A/P  ESRD-HD admitted with HTN urgency.   [facility]- Dr Barraza.   Access functional - Rt avf.   BP at hd 168/76    Plan HD per submitted orders,  UF- 2-3  MBD - Phosphorus binder: continue with home meds- Ca acetate. Phos at 9.5- ? Compliance with binder?  Renal diet  Access: no issues     Continue 3 x per week hemodialysis; SW to ensure " availability of o/p HD chair at discharge        Valorie Partida MD

## 2024-08-22 NOTE — ED PROVIDER NOTES
Emergency Department Provider Note        History of Present Illness     CC: Shortness of Breath     HPI:  54 y.o. 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  Reports medication compliance.  Discharged from the MICU approximately 10 days ago after admission for hypertensive emergency.  Last dialysis yesterday, full session.  Patient states that her shortness of breath began about 30 minutes prior to arrival here.  She denies any significant chest pain associated with this.  Denies nausea, vomiting, and diaphoresis.  Denies any recent fevers, chills, congestion, sick contacts.  No abdominal pain, nausea, vomiting, travel, leg swelling, leg pain.  Not sure if this feels like a COPD exacerbation.    Limitations to history: in distress  Independent historian(s): None  Records Reviewed: Recent available ED and inpatient notes reviewed in EMR. Reviewed H&P and DC sum from admission 8/11/24- 8/12/24 (Pt admitted to  [facility] emergency to MICU )    PMHx/PSHx:  Per HPI.   - has a past medical history of Bacteremia due to methicillin resistant Staphylococcus aureus (07/08/2024), Cardiac/pericardial tamponade ([facility]) (01/20/2024), CHF (congestive heart failure) (Multi), COPD (chronic obstructive pulmonary disease) (Multi), Coronary artery disease, Disorder of sweat glands (02/25/2023), Dry eye syndrome of bilateral lacrimal glands (03/07/2017), ESRD (end stage renal disease) (Multi), Essential (primary) hypertension (12/27/2022), History of acute pancreatitis (12/21/2020), Low grade squamous intraepithelial lesion (LGSIL) on cervicovaginal cytologic smear (02/25/2023), Migraines, 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 (02/25/2023), and Type 2 myocardial infarction (Multi) (01/20/2024).  - has a past surgical history that includes Tubal ligation (03/07/2017); Other surgical  history (03/07/2017); Appendectomy (03/07/2017); Other surgical history (03/07/2017); Other surgical history (12/08/2021); Other surgical history (12/08/2021); and CT angio abdomen w and or wo IV IV contrast (4/23/2023).    Medications:  Reviewed in EMR. See EMR for complete list of medications and doses.    Allergies:  Iodine, Bioflavonoids, Codeine, Flowers, Hydromorphone, and Shellfish containing products    Social History:  - Tobacco:  reports that she has quit smoking. Her smoking use included cigarettes. She has never used smokeless tobacco.   - Alcohol:  reports that she does not currently use alcohol.   - Illicit Drugs:  reports that she does not currently use drugs after having used the following drugs: Cocaine and Marijuana.     ROS:  Per HPI.       Physical Exam     Triage Vitals:  T 36.5 °C (97.7 °F) (Simultaneous filing. User may not have seen previous data.)  HR (!) 109 (Simultaneous filing. User may not have seen previous data.)  BP (!) 189/98  RR (!) 28 (Simultaneous filing. User may not have seen previous data.)  O2 (!) 93 % (Simultaneous filing. User may not have seen previous data.)    Physical Exam  Vitals and nursing note reviewed.   Constitutional:       General: She is in acute distress.   HENT:      Mouth/Throat:      Pharynx: Oropharynx is clear. No pharyngeal swelling.   Eyes:      Pupils: Pupils are equal, round, and reactive to light.   Cardiovascular:      Rate and Rhythm: Normal rate and regular rhythm.      Pulses: Normal pulses.   Pulmonary:      Effort: Tachypnea, accessory muscle usage and respiratory distress present.      Breath sounds: Wheezing present.      Comments: Tachypneic.  Speaking 1 or 2 words.  Appears uncomfortable.  Abdominal:      Palpations: Abdomen is soft.      Tenderness: There is no abdominal tenderness.   Musculoskeletal:      Right lower leg: No tenderness. No edema.      Left lower leg: No tenderness. No edema.   Skin:     General: Skin is warm and dry.       Capillary Refill: Capillary refill takes less than 2 seconds.   Neurological:      Mental Status: She is alert.           Medical Decision Making & ED Course     Labs:   Labs Reviewed   BLOOD GAS VENOUS FULL PANEL - Abnormal       Result Value    POCT pH, Venous 7.38      POCT pCO2, Venous 49      POCT pO2, Venous 32 (*)     POCT SO2, Venous 38 (*)     POCT Oxy Hemoglobin, Venous 37.6 (*)     POCT Hematocrit Calculated, Venous 36.0      POCT Sodium, Venous 137      POCT Potassium, Venous 5.3      POCT Chloride, Venous 99      POCT Ionized Calicum, Venous 1.10      POCT Glucose, Venous 90      POCT Lactate, Venous 0.9      POCT Base Excess, Venous 3.1 (*)     POCT HCO3 Calculated, Venous 29.0 (*)     POCT Hemoglobin, Venous 12.1      POCT Anion Gap, Venous 14.0      Patient Temperature 37.0      FiO2 35     CBC WITH AUTO DIFFERENTIAL - Abnormal    WBC 5.0      nRBC 0.0      RBC 4.19      Hemoglobin 11.9 (*)     Hematocrit 36.9      MCV 88      MCH 28.4      MCHC 32.2      RDW 16.1 (*)     Platelets 227      Neutrophils % 54.3      Immature Granulocytes %, Automated 0.2      Lymphocytes % 25.9      Monocytes % 6.4      Eosinophils % 11.8      Basophils % 1.4      Neutrophils Absolute 2.73      Immature Granulocytes Absolute, Automated 0.01      Lymphocytes Absolute 1.30      Monocytes Absolute 0.32      Eosinophils Absolute 0.59      Basophils Absolute 0.07     PHOSPHORUS - Abnormal    Phosphorus 9.6 (*)    MAGNESIUM - Abnormal    Magnesium 2.57 (*)    COMPREHENSIVE METABOLIC PANEL - Abnormal    Glucose 79      Sodium 141      Potassium 5.0      Chloride 97 (*)     Bicarbonate 25      Anion Gap 24 (*)     Urea Nitrogen 61 (*)     Creatinine 10.09 (*)     eGFR 4 (*)     Calcium 9.7      Albumin 4.6      Alkaline Phosphatase 125 (*)     Total Protein 8.1      AST 23      Bilirubin, Total 0.4      ALT 10     B-TYPE NATRIURETIC PEPTIDE - Abnormal    BNP 1,532 (*)     Narrative:        <100 pg/mL - Heart failure  unlikely  100-299 pg/mL - Intermediate probability of acute heart                  failure exacerbation. Correlate with clinical                  context and patient history.    >=300 pg/mL - Heart Failure likely. Correlate with clinical                  context and patient history.     Biotin interference may cause falsely decreased results. Patients taking a Biotin dose of up to 5 mg/day should refrain from taking Biotin for 24 hours before sample  collection. Providers may contact their local laboratory for further information.   SERIAL TROPONIN-INITIAL - Abnormal    Troponin I, High Sensitivity (CMC) 55 (*)     Narrative:     Less than 99th percentile of normal range cutoff-  Female and children under 18 years old <35 ng/L; Male <54 ng/L: Negative  Repeat testing should be performed if clinically indicated.     Female and children under 18 years old  ng/L; Male  ng/L:  Consistent with possible cardiac damage and possible increased clinical   risk. Serial measurements may help to assess extent of myocardial damage.     >120 ng/L: Consistent with cardiac damage, increased clinical risk and  myocardial infarction. Serial measurements may help assess extent of   myocardial damage.      NOTE: Children less than 1 year old may have higher baseline troponin   levels and results should be interpreted in conjunction with the overall   clinical context.    NOTE: Troponin I testing is performed using a different   testing methodology at [facility] than at other   [facility]. Direct result comparisons should only   be made within the same method.     TROPONIN SERIES- (INITIAL, 1 HR)    Narrative:     The following orders were created for panel order Troponin I Series, High Sensitivity (0, 1 HR).  Procedure                               Abnormality         Status                     ---------                               -----------         ------                     Troponin I, High  Sensiti...[[id number]]  Abnormal            Final result               Troponin, High Sensitivi...[[id number]]                                                   Please view results for these tests on the individual orders.   TYPE AND SCREEN   SERIAL TROPONIN, 1 HOUR        Imaging:   XR chest 1 view   Final Result   Cardiomegaly and prominence of interstitial opacities compatible with   interstitial edema. Small pleural effusion and left basilar   atelectasis.        MACRO:   None        Signed by: Donnell Wise 2024 12:17 AM   Dictation workstation:   [id number]           EK: 26: Rate of 81 bpm, regular rhythm, normal axis, normal intervals, mild ST elevations noted in leads V1 and V2 T wave inversions noted in leads II, III, aVF, V5 and V6.  T wave inversions are seen in prior EKGs as well as mild T wave inversions especially when compared to EKG obtained August 10, 2024.    MDM:  54-year-old female history of ESRD, hypertension, diabetes, heart failure preserved EF, diabetes, COPD, DVT on Eliquis presenting with shortness of breath.  Differential includes flash pulmonary edema in the setting of ESRD, heart failure exacerbation, pneumothorax, ACS, anemia, pneumonia.  Will also evaluate for electrolyte and metabolic abnormalities, worsening renal function.  Patient does still make some urine.  Does not feel volume overloaded, exam without any edema.  Lung exam concerning for prolonged expiratory phase and wheezing.  Will treat with nebulizer treatment.  Will get chest x-ray to further evaluate.  Point-of-care ultrasound was obtained which shows B-lines extensively throughout bilateral lung fields.  On cardiac exam there is symmetric squeeze with appropriate ejection fraction.  Patient is significantly hypertensive.  Concern for hypertensive emergency.  Will get labs to evaluate for endorgan injury, heart failure exacerbation.  Will check lactate to evaluate for organ perfusion.  Patient will require blood  pressure control and likely diuresis if her symptoms are due to pulmonary edema.  She is in acute respiratory distress but not hypoxic.  Will place her on BiPAP for ventilatory support.    After placing the patient on BiPAP she did begin to feel better.  But now declines BiPAP because it is bothering her face.  Her blood work is delayed significantly due to difficulty getting access.  See ED course for details.  Ultimately received a peripheral IJ.  Patient continues to have hypertension, will continue to treat with antihypertensives.  If patient's blood pressure is not controlled with IV push medications she may require a medication drip.  Patient was given escalating doses of labetalol as well as as 1 dose of hydralazine with improvement in her blood pressures.  Most recent blood pressure is 165/67.  Lab work overall remained consistent showing signs of fluid overload with an elevated BNP and likely type II demand ischemia with elevated troponins of 55 and 61.  Patient was given symptomatic management for headache as well.  She continued to saturate well on 2 L of nasal cannula without any desaturations.  Given the improvement in her clinical status she is discussed with admissions  for general medicine admission.  Patient is accepted at this time she remains hemodynamically stable awaiting transfer to regular nursing floor.      ED Course:  ED Course as of 08/22/24 0648   u Aug 22, 2024   0021 54-year-old female history of ESRD and COPD presenting with shortness of breath today.  Recently discharged from MICU for hypertensive emergency 10/12/2024.  Taking her medications at home but having shortness of breath.  Last dialysis yesterday.  No chest pain.  No fever, chills, nausea, vomiting  Exam notable for tachypnea, respiratory distress.  Speaking 1 or 2 words at a time.  Prolonged expiratory phase.  Some wheeze diffusely, no crackles or rails.  [facility]  54-year-old female presenting with shortness of breath.   Given ESRD history concern for flash pulmonary edema, hypertensive emergency.  Will evaluate for hyper kalemia, pulmonary edema, pneumothorax.  Will also screen for ACS and heart failure exacerbation.  Concern for COPD exacerbation given wheezing and prolonged expiratory phase on exam.  Will give nebulizer treatments.  Will give nitroglycerin and Lasix for pulmonary edema as patient does still make urine.  Labs to evaluate for electrolyte/metabolic/renal abnormalities, anemia, leukocytosis.  No infectious complaints low suspicion for infection at this time [FN]   0112 Multiple attempts to get IV on the patient.  ED team was able to successfully collect lab work but unable to establish IV access  Multiple attempts by attending, resident with ultrasound IV.  EJ and other PIV attempted by other ED staff.   Given >10 attempts on pt to get PIV access will get central access in order to deliver meds  [FN]   0234 Feeling somewhat better   Refused to wear bipap mask b/c it hurts her face   Still htn will proceed w central access (still refusing piv) [FN]   0411 Unable to successfully place central line in internal jugular (wire did not pass appropriately).  Placed  piv in internal jugular for venous access  [FN]   0542 GLUCOSE(!): 66  Eating food [FN]      ED Course User Index  [FN] Thuy Cobian MD         Diagnoses as of 08/22/24 0648   Hypertensive emergency   Flash pulmonary edema (Multi)       Independent Result Review and Interpretation: Relevant laboratory and radiographic results were reviewed and independently interpreted by myself.  As necessary, they are commented on in the ED Course.    Social Determinants Limiting Care:    Social Determinants of Health     Tobacco Use: Medium Risk (8/22/2024)    Patient History     Smoking Tobacco Use: Former     Smokeless Tobacco Use: Never     Passive Exposure: Not on file   Alcohol Use: Not At Risk (8/12/2024)    AUDIT-C     Frequency of Alcohol Consumption: Never      Average Number of Drinks: Patient does not drink     Frequency of Binge Drinking: Never   Financial Resource Strain: Low Risk  (8/12/2024)    Overall Financial Resource Strain (CARDIA)     Difficulty of Paying Living Expenses: Not hard at all   Recent Concern: Financial Resource Strain - High Risk (5/29/2024)    Overall Financial Resource Strain (CARDIA)     Difficulty of Paying Living Expenses: Very hard   Food Insecurity: No Food Insecurity (7/15/2024)    Hunger Vital Sign     Worried About Running Out of Food in the Last Year: Never true     Ran Out of Food in the Last Year: Never true   Transportation Needs: No Transportation Needs (8/12/2024)    PRAPARE - Transportation     Lack of Transportation (Medical): No     Lack of Transportation (Non-Medical): No   Physical Activity: Sufficiently Active (7/22/2024)    Exercise Vital Sign     Days of Exercise per Week: 4 days     Minutes of Exercise per Session: 60 min   Stress: No Stress Concern Present (7/15/2024)    [employer] [place] [employer] - Occupational Stress Questionnaire     Feeling of Stress : Only a little   Social Connections: Moderately Integrated (7/15/2024)    Social Connection and Isolation Panel [NHANES]     Frequency of Communication with Friends and Family: More than three times a week     Frequency of Social Gatherings with Friends and Family: Once a week     Attends Orthodoxy Services: More than 4 times per year     Active Member of Clubs or Organizations: No     Attends Club or Organization Meetings: Not on file     Marital Status: Living with partner   Intimate Partner Violence: Not At Risk (7/15/2024)    Humiliation, Afraid, Rape, and Kick questionnaire     Fear of Current or Ex-Partner: No     Emotionally Abused: No     Physically Abused: No     Sexually Abused: No   Depression: Not at risk (8/12/2024)    PHQ-2     PHQ-2 Score: 0   Housing Stability: Low Risk  (8/12/2024)    Housing Stability Vital Sign     Unable to Pay for  Housing in the Last Year: No     Number of Times Moved in the Last Year: 1     Homeless in the Last Year: No   Utilities: Not At Risk (6/17/2024)    Received from [facility] Utilities     Threatened with loss of utilities: No   Digital Equity: At Risk (10/17/2023)    Digital Health Equity Screening     Lacking access to device/internet: Desktop computer, laptop computer, or tablet with broadband internet connection;Smartphone with cellular data plan     Requested support: Digital/internet skills training   Health Literacy: Adequate Health Literacy (7/26/2024)     Health Literacy     Frequency of need for help with medical instructions: Never         Patient seen by and discussed with the attending emergency medicine physician.       Disposition    Admit-medicine    Art Gates DO   Emergency Medicine PGY-3  [facility]      Procedures      Central Line    Performed by: Thuy Cobian MD  Authorized by: Thuy Cobian MD    Consent:     Consent obtained:  Verbal and written    Consent given by:  Patient    Risks, benefits, and alternatives were discussed: yes      Risks discussed:  Bleeding, infection, nerve damage, incorrect placement and pneumothorax    Alternatives discussed:  No treatment  Universal protocol:     Procedure explained and questions answered to patient or proxy's satisfaction: yes      Relevant documents present and verified: yes      Patient identity confirmed:  Verbally with patient, provided demographic data and arm band  Pre-procedure details:     Indication(s): insufficient peripheral access      Hand hygiene: Hand hygiene performed prior to insertion      Sterile barrier technique: All elements of maximal sterile technique followed      Skin preparation:  Chlorhexidine    Skin preparation agent: Skin preparation agent completely dried prior to procedure    Anesthesia:     Anesthesia method:  Local infiltration    Local  anesthetic:  Lidocaine 2% w/o epi  Procedure details:     Location:  R internal jugular    Patient position:  Supine    Procedural supplies:  Triple lumen    Landmarks identified: yes      Ultrasound guidance: yes      Ultrasound guidance timing: prior to insertion and real time      Sterile ultrasound techniques: Sterile gel and sterile probe covers were used      Number of attempts:  1    Successful placement: no    Post-procedure details:     Post-procedure:  Dressing applied  Comments:      Able to successfully place needle into the internal jugular however upon threading the wire there was significant resistance despite multiple attempts to redirect.  Given patient only required central access due to inadequate peripheral access decision made to abort procedure due to difficulty passing the wire.    Instead decision was made to place an 18-gauge peripheral IV into the right IJ for peripheral access.  Patient tolerated this well   ? [employer] last updated 8/22/2024 1:29 AM        Art Gates DO  Resident  08/22/24 0651

## 2024-08-22 NOTE — NURSING NOTE
Report to Receiving RN:    Report To: Taran ISAAC   Time Report Called: [id number]  Hand-Off Communication: HD completed and tolerated well, no issue during treatment. Removed 1.1L. Post /85 HR 72. BP jumped up after blood was returned. Zofran was given 1240, and clonidine 0.2mg given post tx at 1500. Zemplar also given while here.   Complications During Treatment: No  Ultrafiltration Treatment: Yes  Medications Administered During Dialysis: Yes, see MAR   Blood Products Administered During Dialysis: No  Labs Sent During Dialysis: No  Heparin Drip Rate Changes: N/A  Dialysis Catheter Dressing: NA  Last Dressing Change: NA

## 2024-08-22 NOTE — NURSING NOTE
Report from Sending RN:    Report From: Taran ( RN)  Recent Surgery of Procedure: No  Baseline Level of Consciousness (LOC): a/o x 4  Oxygen Use: Yes, 2 liters  Type: nc  Diabetic: Yes,   Last BP Med Given Day of Dialysis: yes  Last Pain Med Given: yes  Lab Tests to be Obtained with Dialysis: No  Blood Transfusion to be Given During Dialysis: No  Available IV Access: No  Medications to be Administered During Dialysis: No  Continuous IV Infusion Running: No  Restraints on Currently or in the Last 24 Hours: No  Hand-Off Communication: No acute overnight or morning events; vss; Pt did take morning medications; Pt will not need labs; Pt may go off without telemetry; Pt is a full code; Cassidy Gonzalez RN.  Dialysis Catheter Dressing: right upper arm fistula  Last Dressing Change: yes

## 2024-08-22 NOTE — PROGRESS NOTES
Stacey Heath  Age: 54 y.o.  MRN: [id number]  Date: 8/21/2024  Location of service: in community    Program Details  Medicaid Community Clinical Case Management  Status: Enrolled  Effective Dates: 10/17/2023 - present  Responsible Staff: NAREN Davidson      Goals Reviewed:  Problem: Anxiety       Goal: Attempts to manage anxiety with help       Priority: High         Goal: Verbalizes ways to manage anxiety       Priority: High         Goal: Implements measures to reduce anxiety       Priority: High        Problem: Financial Stressors       Goal: Assistance with financial concerns         This provider met with patient at the patient's home. This provider listened with empathy as patient explained her experience at the local [facility] earlier in the day. Patient reports that her treatment at the new dialysis center went really well yesterday. Provider and patient discussed the importance and benefits of getting into a smooth routine in which can help patient control her stress levels which impact patient's blood pressure. Provider and patient also discussed the benefits of receiving healthy emotional support. Patient reports that her new  blood pressure cuff is working really well for her.                  NAREN Davidson

## 2024-08-23 LAB — GLUCOSE BLD MANUAL STRIP-MCNC: 100 MG/DL (ref 74–99)

## 2024-08-23 PROCEDURE — 2500000001 HC RX 250 WO HCPCS SELF ADMINISTERED DRUGS (ALT 637 FOR MEDICARE OP)

## 2024-08-23 PROCEDURE — 2500000002 HC RX 250 W HCPCS SELF ADMINISTERED DRUGS (ALT 637 FOR MEDICARE OP, ALT 636 FOR OP/ED)

## 2024-08-23 PROCEDURE — 36415 COLL VENOUS BLD VENIPUNCTURE: CPT

## 2024-08-23 PROCEDURE — 82947 ASSAY GLUCOSE BLOOD QUANT: CPT

## 2024-08-23 PROCEDURE — 1100000001 HC PRIVATE ROOM DAILY

## 2024-08-23 PROCEDURE — 99232 SBSQ HOSP IP/OBS MODERATE 35: CPT | Performed by: STUDENT IN AN ORGANIZED HEALTH CARE EDUCATION/TRAINING PROGRAM

## 2024-08-23 RX ORDER — DIPHENHYDRAMINE HCL 25 MG
25 CAPSULE ORAL NIGHTLY PRN
Status: DISCONTINUED | OUTPATIENT
Start: 2024-08-23 | End: 2024-08-25 | Stop reason: HOSPADM

## 2024-08-23 SDOH — HEALTH STABILITY: MENTAL HEALTH: HOW MANY STANDARD DRINKS CONTAINING ALCOHOL DO YOU HAVE ON A TYPICAL DAY?: PATIENT DOES NOT DRINK

## 2024-08-23 SDOH — ECONOMIC STABILITY: HOUSING INSECURITY: IN THE PAST 12 MONTHS, HOW MANY TIMES HAVE YOU MOVED WHERE YOU WERE LIVING?: 1

## 2024-08-23 SDOH — SOCIAL STABILITY: SOCIAL INSECURITY: DOES ANYONE TRY TO KEEP YOU FROM HAVING/CONTACTING OTHER FRIENDS OR DOING THINGS OUTSIDE YOUR HOME?: NO

## 2024-08-23 SDOH — SOCIAL STABILITY: SOCIAL INSECURITY: ARE YOU OR HAVE YOU BEEN THREATENED OR ABUSED PHYSICALLY, EMOTIONALLY, OR SEXUALLY BY ANYONE?: NO

## 2024-08-23 SDOH — ECONOMIC STABILITY: INCOME INSECURITY: IN THE LAST 12 MONTHS, WAS THERE A TIME WHEN YOU WERE NOT ABLE TO PAY THE MORTGAGE OR RENT ON TIME?: NO

## 2024-08-23 SDOH — SOCIAL STABILITY: SOCIAL INSECURITY: ARE THERE ANY APPARENT SIGNS OF INJURIES/BEHAVIORS THAT COULD BE RELATED TO ABUSE/NEGLECT?: NO

## 2024-08-23 SDOH — HEALTH STABILITY: MENTAL HEALTH: HOW OFTEN DO YOU HAVE 6 OR MORE DRINKS ON ONE OCCASION?: NEVER

## 2024-08-23 SDOH — SOCIAL STABILITY: SOCIAL INSECURITY: HAVE YOU HAD ANY THOUGHTS OF HARMING ANYONE ELSE?: NO

## 2024-08-23 SDOH — SOCIAL STABILITY: SOCIAL INSECURITY: ABUSE: ADULT

## 2024-08-23 SDOH — SOCIAL STABILITY: SOCIAL INSECURITY: HAS ANYONE EVER THREATENED TO HURT YOUR FAMILY OR YOUR PETS?: NO

## 2024-08-23 SDOH — HEALTH STABILITY: MENTAL HEALTH: HOW OFTEN DO YOU HAVE A DRINK CONTAINING ALCOHOL?: NEVER

## 2024-08-23 SDOH — ECONOMIC STABILITY: HOUSING INSECURITY: AT ANY TIME IN THE PAST 12 MONTHS, WERE YOU HOMELESS OR LIVING IN A SHELTER (INCLUDING NOW)?: NO

## 2024-08-23 SDOH — ECONOMIC STABILITY: INCOME INSECURITY: IN THE PAST 12 MONTHS, HAS THE ELECTRIC, GAS, OIL, OR WATER COMPANY THREATENED TO SHUT OFF SERVICE IN YOUR HOME?: NO

## 2024-08-23 SDOH — SOCIAL STABILITY: SOCIAL INSECURITY: DO YOU FEEL UNSAFE GOING BACK TO THE PLACE WHERE YOU ARE LIVING?: NO

## 2024-08-23 SDOH — ECONOMIC STABILITY: INCOME INSECURITY: HOW HARD IS IT FOR YOU TO PAY FOR THE VERY BASICS LIKE FOOD, HOUSING, MEDICAL CARE, AND HEATING?: NOT HARD AT ALL

## 2024-08-23 SDOH — SOCIAL STABILITY: SOCIAL INSECURITY: WERE YOU ABLE TO COMPLETE ALL THE BEHAVIORAL HEALTH SCREENINGS?: YES

## 2024-08-23 SDOH — SOCIAL STABILITY: SOCIAL INSECURITY: DO YOU FEEL ANYONE HAS EXPLOITED OR TAKEN ADVANTAGE OF YOU FINANCIALLY OR OF YOUR PERSONAL PROPERTY?: NO

## 2024-08-23 SDOH — SOCIAL STABILITY: SOCIAL INSECURITY: HAVE YOU HAD THOUGHTS OF HARMING ANYONE ELSE?: NO

## 2024-08-23 ASSESSMENT — COGNITIVE AND FUNCTIONAL STATUS - GENERAL
MOBILITY SCORE: 24
DAILY ACTIVITIY SCORE: 24
PATIENT BASELINE BEDBOUND: NO

## 2024-08-23 ASSESSMENT — ACTIVITIES OF DAILY LIVING (ADL)
TOILETING: INDEPENDENT
WALKS IN HOME: INDEPENDENT
PATIENT'S MEMORY ADEQUATE TO SAFELY COMPLETE DAILY ACTIVITIES?: YES
JUDGMENT_ADEQUATE_SAFELY_COMPLETE_DAILY_ACTIVITIES: YES
ADEQUATE_TO_COMPLETE_ADL: YES
DRESSING YOURSELF: INDEPENDENT
BATHING: INDEPENDENT
GROOMING: INDEPENDENT
HEARING - LEFT EAR: FUNCTIONAL
LACK_OF_TRANSPORTATION: NO
FEEDING YOURSELF: INDEPENDENT
HEARING - RIGHT EAR: FUNCTIONAL

## 2024-08-23 ASSESSMENT — LIFESTYLE VARIABLES
HOW MANY STANDARD DRINKS CONTAINING ALCOHOL DO YOU HAVE ON A TYPICAL DAY: PATIENT DOES NOT DRINK
PRESCIPTION_ABUSE_PAST_12_MONTHS: NO
HOW OFTEN DO YOU HAVE A DRINK CONTAINING ALCOHOL: NEVER
SKIP TO QUESTIONS 9-10: 1
AUDIT-C TOTAL SCORE: 0
AUDIT-C TOTAL SCORE: 0
SUBSTANCE_ABUSE_PAST_12_MONTHS: NO
HOW OFTEN DO YOU HAVE 6 OR MORE DRINKS ON ONE OCCASION: NEVER

## 2024-08-23 ASSESSMENT — PAIN SCALES - GENERAL
PAINLEVEL_OUTOF10: 0 - NO PAIN
PAINLEVEL_OUTOF10: 3
PAINLEVEL_OUTOF10: 0 - NO PAIN
PAINLEVEL_OUTOF10: 5 - MODERATE PAIN
PAINLEVEL_OUTOF10: 4
PAINLEVEL_OUTOF10: 4

## 2024-08-23 ASSESSMENT — PAIN - FUNCTIONAL ASSESSMENT
PAIN_FUNCTIONAL_ASSESSMENT: 0-10
PAIN_FUNCTIONAL_ASSESSMENT: 0-10

## 2024-08-23 ASSESSMENT — PAIN SCALES - WONG BAKER: WONGBAKER_NUMERICALRESPONSE: NO HURT

## 2024-08-23 ASSESSMENT — PAIN DESCRIPTION - LOCATION: LOCATION: HEAD

## 2024-08-23 NOTE — CARE PLAN
The patient's goals for the shift include Patient will have no c/o of  pain Discomfort or rate pain <4 this shift    The clinical goals for the shift include Patient will have no c/o of feeling SOB .Pulse ox >90% this shift    Over the shift, the patient did not make progress toward the following goals. Barriers to progression include . Recommendations to address these barriers include   Problem: Respiratory  Goal: Clear secretions with interventions this shift  Outcome: Progressing  Goal: Minimize anxiety/maximize coping throughout shift  Outcome: Progressing  Goal: Minimal/no exertional discomfort or dyspnea this shift  Outcome: Progressing  Goal: No signs of respiratory distress (eg. Use of accessory muscles. Peds grunting)  Outcome: Progressing  Goal: Patent airway maintained this shift  Outcome: Progressing  Goal: Tolerate mechanical ventilation evidenced by VS/agitation level this shift  Outcome: Progressing  Goal: Tolerate pulmonary toileting this shift  Outcome: Progressing  Goal: Verbalize decreased shortness of breath this shift  Outcome: Progressing  Goal: Wean oxygen to maintain O2 saturation per order/standard this shift  Outcome: Progressing  Goal: Increase self care and/or family involvement in next 24 hours  Outcome: Progressing     Problem: Pain - Adult  Goal: Verbalizes/displays adequate comfort level or baseline comfort level  Outcome: Progressing     Problem: Safety - Adult  Goal: Free from fall injury  Outcome: Progressing     Problem: Discharge Planning  Goal: Discharge to home or other facility with appropriate resources  Outcome: Progressing     Problem: Chronic Conditions and Co-morbidities  Goal: Patient's chronic conditions and co-morbidity symptoms are monitored and maintained or improved  Outcome: Progressing     Problem: Pain  Goal: Takes deep breaths with improved pain control throughout the shift  Outcome: Progressing  Goal: Turns in bed with improved pain control throughout the  shift  Outcome: Progressing  Goal: Walks with improved pain control throughout the shift  Outcome: Progressing  Goal: Performs ADL's with improved pain control throughout shift  Outcome: Progressing  Goal: Participates in PT with improved pain control throughout the shift  Outcome: Progressing  Goal: Free from opioid side effects throughout the shift  Outcome: Progressing  Goal: Free from acute confusion related to pain meds throughout the shift  Outcome: Progressing     Problem: Fall/Injury  Goal: Not fall by end of shift  Outcome: Progressing  Goal: Be free from injury by end of the shift  Outcome: Progressing  Goal: Verbalize understanding of personal risk factors for fall in the hospital  Outcome: Progressing  Goal: Verbalize understanding of risk factor reduction measures to prevent injury from fall in the home  Outcome: Progressing  Goal: Use assistive devices by end of the shift  Outcome: Progressing  Goal: Pace activities to prevent fatigue by end of the shift  Outcome: Progressing   .

## 2024-08-23 NOTE — PROGRESS NOTES
"Stacey Heath is a 54 y.o. female on hospital day 1 of admission presenting with Hypertensive emergency.    Subjective     The patient states she feels much better today. Denies SOB at this time.     Objective     GENERAL APPEARANCE: A&Ox3, appears in no acute distress  HEAD: normocephalic, atraumatic  THROAT: Oral cavity and pharynx normal. No inflammation, swelling, exudate, or lesions.  NECK: Neck supple, non-tender without lymphadenopathy, masses or thyromegaly.  CARDIAC: Normal S1 and S2. No S3, S4 or murmurs. Rhythm is regular. There is no peripheral edema, cyanosis or pallor. Extremities are warm and well perfused. No carotid bruits.  LUNGS: Clear to auscultation bilaterally without rales, rhonchi, wheezing or diminished breath sounds.  ABDOMEN: Positive bowel sounds. Soft, nondistended, nontender. No guarding or rebound. No masses.  EXTREMITIES: No significant deformity or joint abnormality. No edema. Peripheral pulses intact. No varicosities.  SKIN: Skin normal color, texture and turgor with no lesions or rash  PSYCHIATRIC: oriented to person, place, and time, good judgement and reason, without hallucinations, abnormal affect or abnormal behaviors during the examination. Patient is not suicidal.        Last Recorded Vitals  Blood pressure 115/51, pulse 78, temperature 36.2 °C (97.2 °F), resp. rate 16, height 1.397 m (4' 7\"), weight 51.3 kg (113 lb 1.5 oz), SpO2 100%.  Intake/Output last 3 Shifts:  I/O last 3 completed shifts:  In: 1000 (18.5 mL/kg) [I.V.:600 (11.1 mL/kg); Other:400]  Out: 1124 (20.8 mL/kg) [Other:1124]  Weight: 54 kg     Relevant Results  Lab Results   Component Value Date    WBC 5.8 08/22/2024    HGB 10.3 (L) 08/22/2024    HCT 31.6 (L) 08/22/2024    MCV 88 08/22/2024     08/22/2024      Lab Results   Component Value Date    GLUCOSE 66 (L) 08/22/2024    CALCIUM 9.4 08/22/2024     08/22/2024    K 4.5 08/22/2024    CO2 22 08/22/2024     08/22/2024    BUN 61 (H) 08/22/2024 "    CREATININE 10.18 (H) 08/22/2024     Scheduled medications  apixaban, 5 mg, oral, BID  aspirin, 81 mg, oral, Daily  atorvastatin, 80 mg, oral, Nightly  B complex-vitamin C-folic acid, 1 capsule, oral, Daily  calcium acetate, 1,334 mg, oral, TID  carvedilol, 50 mg, oral, BID  cloNIDine, 0.2 mg, oral, TID  fluticasone furoate-vilanteroL, 1 puff, inhalation, Daily  gabapentin, 300 mg, oral, Nightly  hydrALAZINE, 100 mg, oral, TID  isosorbide mononitrate ER, 120 mg, oral, Daily  NIFEdipine ER, 90 mg, oral, Daily before breakfast  paricalcitol, 1 mcg, intravenous, After Dialysis  valsartan, 320 mg, oral, Daily      Continuous medications     PRN medications  PRN medications: acetaminophen **OR** acetaminophen **OR** acetaminophen, oxygen    Assessment/Plan     Stacey Heath is a 54 y.o. 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  Patient received multiple blood pressure medications via IV as well as p.o. and blood pressure is now 160s to 170s systolic.  Nephrology notified to schedule dialysis per regular schedule.  Following dialysis will slowly restart home blood pressure medications.     hypertensive emergency  - home BP regimen: carvedilol 50 BID, valsartan 320, nifedipine 90, isosorbide mononitrate 120, hydral 100 TID, clonidine 0.2mg TID   - Patient's BP appropriately decreasing with home meds. Likely discharge home tomorrow if BP is stable.     ESRD on HD  - Nephrology consulted for HD per regular TTS schedule     hx of brachial DVT  - continue home Eliquis 5 BID      acute hypoxemic respiratory failure   - on 2L NC on admission   - likely 2/2 hypertension   - wean as able   - no evidence of concomitant infection such  as pna        N: renal diet  GI: none  DVT: home apixaban   CODE STATUS: full code (confirmed on admission)   NOK: Diya Jang (daughter) [phone]  Dispo: BP improved, discharge tomorrow if BP remains in a reasonable range    Meir Tubbs MD     The patient encounter includes all but not limited to; Evaluation of laboratory results, pertinent imaging, and vital signs. Daily updates are discussed with any consulting services and family/medical power of  as needed. The patient's discharge  process begins at admission and daily contact with the patient's TCC and SW is pertinent in their efficient and safe discharge.

## 2024-08-23 NOTE — CARE PLAN
The patient's goals for the shift include Patient will have no c/o of  pain Discomfort or rate pain <4 this shift    The clinical goals for the shift include Patient will have no c/o of feeling SOB .Pulse ox >90% this shift

## 2024-08-23 NOTE — PROGRESS NOTES
Pharmacy Medication History Review    Stacey Heath is a 54 y.o. female admitted for Hypertensive emergency. Pharmacy reviewed the patient's [id number] medications and allergies for accuracy.    The list below reflects the updated PTA list. Comments regarding how patient may be taking medications differently can be found in the Admit Orders Activity  Prior to Admission Medications   Prescriptions Last Dose Informant   B complex-vitamin C-folic acid (Nephrocaps) 1 mg capsule 8/21/2024 Self   Sig: Take 1 capsule by mouth once daily.   NIFEdipine ER (Adalat CC) 90 mg 24 hr tablet 8/21/2024 Self   Sig: Take 1 tablet (90 mg) by mouth once daily in the morning. Take before meals. Do not crush, chew, or split.   acetaminophen (Tylenol) 325 mg tablet Past Month Self   Sig: Take 2 tablets (650 mg) by mouth every 4 hours if needed for mild pain (1 - 3) or moderate pain (4 - 6).   albuterol 1.25 mg/3 mL nebulizer solution 8/21/2024 Self   Sig: Take 3 mL (1.25 mg) by nebulization every 6 hours if needed for wheezing.   albuterol sulfate (Proair Digihaler) 90 mcg/actuation aero powdr breath act w/sensor inhaler 8/21/2024 Self   Sig: Inhale 2 puffs 2 times a day as needed for wheezing or shortness of breath.   apixaban (Eliquis) 5 mg tablet 8/21/2024 Self   Sig: Take 1 tablet (5 mg) by mouth 2 times a day.   aspirin 81 mg EC tablet 8/21/2024 Self   Sig: Take 1 tablet (81 mg) by mouth once daily.   atorvastatin (Lipitor) 80 mg tablet 8/21/2024 Self   Sig: Take 1 tablet (80 mg) by mouth once daily at bedtime.   calcium acetate (Phoslo) 667 mg capsule 8/21/2024 Self   Sig: Take 2 capsules (1,334 mg) by mouth 3 times daily (morning, midday, late afternoon).   carvedilol (Coreg) 25 mg tablet 8/21/2024 Self   Sig: Take 2 tablets (50 mg) by mouth 2 times a day.   cloNIDine (Catapres) 0.2 mg tablet 8/21/2024 Self   Sig: Take 1 tablet (0.2 mg) by mouth 3 times a day.   diphenhydrAMINE (BENADryl) 25 mg capsule Past Week Self    Sig: Take 1 capsule (25 mg) by mouth as needed at bedtime for itching or sleep.   epoetin joceline (Epogen,Procrit) 10,000 unit/mL injection Unknown Self   Sig: Inject 1 mL (10,000 Units) under the skin 3 times a week.   fluticasone (Flonase) 50 mcg/actuation nasal spray Past Week Self   Sig: Administer 2 sprays into each nostril once daily. Shake gently. Before first use, prime pump. After use, clean tip and replace cap.   fluticasone furoate-vilanteroL (Breo Ellipta) 100-25 mcg/dose inhaler 8/21/2024 Self   Sig: Inhale 1 puff once daily.   gabapentin (Neurontin) 300 mg capsule 8/21/2024 Self   Sig: Take 1 capsule (300 mg) by mouth once daily at bedtime.   hydrALAZINE (Apresoline) 100 mg tablet 8/21/2024 Self   Sig: Take 1 tablet (100 mg) by mouth 3 times a day.   isosorbide mononitrate ER (Imdur) 120 mg 24 hr tablet 8/21/2024 Self   Sig: Take 1 tablet (120 mg) by mouth once daily. Do not crush or chew.   polyethylene glycol (Glycolax, Miralax) 17 gram/dose powder Past Month Self   Sig: Take 17 g (1 scoop dissolved in liquid) by mouth once daily. Do not start before July 26, 2024.   sodium chloride (Ocean) 0.65 % nasal spray 8/21/2024 Self   Sig: Administer 1 spray into each nostril 4 times a day as needed for congestion.   torsemide (Demadex) 20 mg tablet 8/21/2024 Self   Sig: Take 2 tablets once daily on non-dialysis days only. Do not take on dialysis days   valsartan (Diovan) 320 mg tablet 8/21/2024 Self   Sig: Take 1 tablet (320 mg) by mouth once daily.      Facility-Administered Medications: None        The list below reflects the updated allergy list. Please review each documented allergy for additional clarification and justification.  Allergies  Reviewed by Mago Guerrero RN on 8/22/2024        Severity Reactions Comments    Iodine High Hives, Itching, Unknown     Bioflavonoids Not Specified Swelling     Codeine Not Specified Itching, Hives, Unknown Tolerates percocet   Tolerates percocet Tolerates percocet     Flowers Not Specified Itching     Hydromorphone Not Specified Itching     Shellfish Containing Products Not Specified Swelling SEAFOOD            Patient accepts M2B at discharge. Pharmacy has been updated to [facility].    Sources used to confirm home medication list include:   Patient interview (very pleasant and a good historian)  [facility]  Care Everywhere  Medication fill history  Discharge Summary 8/12/24    Medications ADDED:   None    Medications MODIFIED:  Hydralazine  Polyethylene Glycol    Medications REMOVED:  Benzonatate    Below are additional concerns with the patient's PTA list.  Patient states she only takes Calcium Acetate if she has food at the same time.  Epoetin is not given every dialysis session, is based on labs.    Josephine Felipe [facility]   Transitions of Care Pharmacist  [facility]  Please reach out via Secure Chat for questions, or if no response call [employer] or [facility]

## 2024-08-24 ENCOUNTER — APPOINTMENT (OUTPATIENT)
Dept: DIALYSIS | Facility: HOSPITAL | Age: 54
End: 2024-08-24
Payer: COMMERCIAL

## 2024-08-24 ENCOUNTER — PHARMACY VISIT (OUTPATIENT)
Dept: PHARMACY | Facility: CLINIC | Age: 54
End: 2024-08-24
Payer: MEDICARE

## 2024-08-24 LAB — GLUCOSE BLD MANUAL STRIP-MCNC: 106 MG/DL (ref 74–99)

## 2024-08-24 PROCEDURE — 1100000001 HC PRIVATE ROOM DAILY

## 2024-08-24 PROCEDURE — 99233 SBSQ HOSP IP/OBS HIGH 50: CPT | Performed by: STUDENT IN AN ORGANIZED HEALTH CARE EDUCATION/TRAINING PROGRAM

## 2024-08-24 PROCEDURE — 2500000002 HC RX 250 W HCPCS SELF ADMINISTERED DRUGS (ALT 637 FOR MEDICARE OP, ALT 636 FOR OP/ED)

## 2024-08-24 PROCEDURE — 90935 HEMODIALYSIS ONE EVALUATION: CPT | Performed by: INTERNAL MEDICINE

## 2024-08-24 PROCEDURE — 90937 HEMODIALYSIS REPEATED EVAL: CPT

## 2024-08-24 PROCEDURE — 2500000001 HC RX 250 WO HCPCS SELF ADMINISTERED DRUGS (ALT 637 FOR MEDICARE OP): Performed by: STUDENT IN AN ORGANIZED HEALTH CARE EDUCATION/TRAINING PROGRAM

## 2024-08-24 PROCEDURE — 2500000002 HC RX 250 W HCPCS SELF ADMINISTERED DRUGS (ALT 637 FOR MEDICARE OP, ALT 636 FOR OP/ED): Performed by: STUDENT IN AN ORGANIZED HEALTH CARE EDUCATION/TRAINING PROGRAM

## 2024-08-24 PROCEDURE — 2500000005 HC RX 250 GENERAL PHARMACY W/O HCPCS: Performed by: STUDENT IN AN ORGANIZED HEALTH CARE EDUCATION/TRAINING PROGRAM

## 2024-08-24 PROCEDURE — 8010000001 HC DIALYSIS - HEMODIALYSIS PER DAY

## 2024-08-24 PROCEDURE — 2500000001 HC RX 250 WO HCPCS SELF ADMINISTERED DRUGS (ALT 637 FOR MEDICARE OP)

## 2024-08-24 PROCEDURE — 2500000004 HC RX 250 GENERAL PHARMACY W/ HCPCS (ALT 636 FOR OP/ED): Performed by: STUDENT IN AN ORGANIZED HEALTH CARE EDUCATION/TRAINING PROGRAM

## 2024-08-24 PROCEDURE — 82947 ASSAY GLUCOSE BLOOD QUANT: CPT

## 2024-08-24 PROCEDURE — RXMED WILLOW AMBULATORY MEDICATION CHARGE

## 2024-08-24 RX ORDER — POLYETHYLENE GLYCOL 3350 17 G/17G
17 POWDER, FOR SOLUTION ORAL ONCE
Status: DISCONTINUED | OUTPATIENT
Start: 2024-08-24 | End: 2024-08-25 | Stop reason: HOSPADM

## 2024-08-24 RX ORDER — HYDRALAZINE HYDROCHLORIDE 100 MG/1
100 TABLET, FILM COATED ORAL 3 TIMES DAILY
Qty: 90 TABLET | Refills: 1 | Status: SHIPPED | OUTPATIENT
Start: 2024-08-24 | End: 2024-10-23

## 2024-08-24 RX ORDER — VALSARTAN 320 MG/1
320 TABLET ORAL DAILY
Qty: 30 TABLET | Refills: 1 | Status: SHIPPED | OUTPATIENT
Start: 2024-08-24 | End: 2024-10-23

## 2024-08-24 RX ORDER — CARVEDILOL 25 MG/1
50 TABLET ORAL 2 TIMES DAILY
Qty: 120 TABLET | Refills: 1 | Status: SHIPPED | OUTPATIENT
Start: 2024-08-24 | End: 2024-10-23

## 2024-08-24 RX ORDER — ONDANSETRON HYDROCHLORIDE 2 MG/ML
4 INJECTION, SOLUTION INTRAVENOUS ONCE
Status: DISCONTINUED | OUTPATIENT
Start: 2024-08-24 | End: 2024-08-24

## 2024-08-24 RX ORDER — GUAIFENESIN 600 MG/1
1200 TABLET, EXTENDED RELEASE ORAL ONCE
Status: COMPLETED | OUTPATIENT
Start: 2024-08-24 | End: 2024-08-24

## 2024-08-24 RX ORDER — ONDANSETRON 4 MG/1
4 TABLET, FILM COATED ORAL ONCE
Status: COMPLETED | OUTPATIENT
Start: 2024-08-24 | End: 2024-08-24

## 2024-08-24 RX ORDER — ISOSORBIDE MONONITRATE 120 MG/1
120 TABLET, EXTENDED RELEASE ORAL DAILY
Qty: 30 TABLET | Refills: 1 | Status: SHIPPED | OUTPATIENT
Start: 2024-08-24 | End: 2024-10-23

## 2024-08-24 RX ORDER — DICYCLOMINE HYDROCHLORIDE 10 MG/1
10 CAPSULE ORAL ONCE
Status: COMPLETED | OUTPATIENT
Start: 2024-08-24 | End: 2024-08-24

## 2024-08-24 RX ORDER — SIMETHICONE 80 MG
120 TABLET,CHEWABLE ORAL ONCE
Status: COMPLETED | OUTPATIENT
Start: 2024-08-24 | End: 2024-08-24

## 2024-08-24 RX ORDER — NIFEDIPINE 90 MG/1
90 TABLET, EXTENDED RELEASE ORAL
Qty: 30 TABLET | Refills: 1 | Status: SHIPPED | OUTPATIENT
Start: 2024-08-24 | End: 2024-10-23

## 2024-08-24 RX ORDER — CLONIDINE HYDROCHLORIDE 0.2 MG/1
0.2 TABLET ORAL 3 TIMES DAILY
Qty: 90 TABLET | Refills: 1 | Status: SHIPPED | OUTPATIENT
Start: 2024-08-24 | End: 2024-10-23

## 2024-08-24 RX ORDER — FLUTICASONE PROPIONATE 50 MCG
2 SPRAY, SUSPENSION (ML) NASAL DAILY
Status: DISCONTINUED | OUTPATIENT
Start: 2024-08-24 | End: 2024-08-25 | Stop reason: HOSPADM

## 2024-08-24 RX ORDER — GUAIFENESIN 100 MG/5ML
200 SOLUTION ORAL EVERY 4 HOURS PRN
Status: DISCONTINUED | OUTPATIENT
Start: 2024-08-24 | End: 2024-08-25 | Stop reason: HOSPADM

## 2024-08-24 ASSESSMENT — PAIN SCALES - PAIN ASSESSMENT IN ADVANCED DEMENTIA (PAINAD)
TOTALSCORE: 0
CONSOLABILITY: NO NEED TO CONSOLE
BODYLANGUAGE: RELAXED
FACIALEXPRESSION: SMILING OR INEXPRESSIVE
BODYLANGUAGE: NORMAL

## 2024-08-24 ASSESSMENT — PAIN SCALES - GENERAL
PAINLEVEL_OUTOF10: 0 - NO PAIN
PAINLEVEL_OUTOF10: 7

## 2024-08-24 ASSESSMENT — PAIN DESCRIPTION - LOCATION: LOCATION: ABDOMEN

## 2024-08-24 ASSESSMENT — PAIN - FUNCTIONAL ASSESSMENT
PAIN_FUNCTIONAL_ASSESSMENT: NO/DENIES PAIN
PAIN_FUNCTIONAL_ASSESSMENT: NO/DENIES PAIN

## 2024-08-24 NOTE — PROCEDURES
Seen on HD  No complaints  BP high as she refused AM dose of anti-hypertensives before coming to HD for fear of hypotension on HD  Exam unremarkable  Tolerating HD well per submitted orders 2 K 2.5 Ca 1.5 L UF goal    Vitals:    08/23/24 2150 08/24/24 0520 08/24/24 0741 08/24/24 0924   BP: 145/60 (!) 202/82  (!) 218/92   BP Location:       Patient Position:       Pulse:  84 87 96   Resp:  17     Temp:  36.5 °C (97.7 °F) 36.7 °C (98.1 °F)    TempSrc:   Temporal    SpO2:  93%     Weight:       Height:          Results from last 72 hours   Lab Units 08/22/24  0331 08/21/24  2348   SODIUM mmol/L 141 141   POTASSIUM mmol/L 4.5 5.0   CHLORIDE mmol/L 100 97*   CO2 mmol/L 22 25   BUN mg/dL 61* 61*   PHOSPHORUS mg/dL 9.5* 9.6*   HEMOGLOBIN g/dL 10.3* 11.9*    apixaban, 5 mg, oral, BID  aspirin, 81 mg, oral, Daily  atorvastatin, 80 mg, oral, Nightly  B complex-vitamin C-folic acid, 1 capsule, oral, Daily  calcium acetate, 1,334 mg, oral, TID  carvedilol, 50 mg, oral, BID  cloNIDine, 0.2 mg, oral, TID  fluticasone, 2 spray, Each Nostril, Daily  fluticasone furoate-vilanteroL, 1 puff, inhalation, Daily  gabapentin, 300 mg, oral, Nightly  hydrALAZINE, 100 mg, oral, TID  isosorbide mononitrate ER, 120 mg, oral, Daily  NIFEdipine ER, 90 mg, oral, Daily before breakfast  paricalcitol, 1 mcg, intravenous, After Dialysis  valsartan, 320 mg, oral, Daily

## 2024-08-24 NOTE — NURSING NOTE
Report to Receiving RN:    Report To: PONCHO Caal  Time Report Called: 1116  Hand-Off Communication: Pt completed 3hrs HD tx, 1.3L fluid removed, no issues, VSS, A/O, no voiced concerns.   Complications During Treatment: No  Ultrafiltration Treatment: Yes  Medications Administered During Dialysis: Yes clonidine 0.2 mg  Blood Products Administered During Dialysis: No  Labs Sent During Dialysis: No  Heparin Drip Rate Changes: No      Last Updated: 11:19 AM by EDGAR CUEVAS

## 2024-08-24 NOTE — PROGRESS NOTES
"Stacey Heath is a 54 y.o. female on hospital day 2 of admission presenting with Hypertensive emergency.    Subjective     The patient was to discharge today but complaining of abdominal pain. Will discharge in the am.    Objective     GENERAL APPEARANCE: A&Ox3, appears in no acute distress  HEAD: normocephalic, atraumatic  THROAT: Oral cavity and pharynx normal. No inflammation, swelling, exudate, or lesions.  NECK: Neck supple, non-tender without lymphadenopathy, masses or thyromegaly.  CARDIAC: Normal S1 and S2. No S3, S4 or murmurs. Rhythm is regular. There is no peripheral edema, cyanosis or pallor. Extremities are warm and well perfused. No carotid bruits.  LUNGS: Clear to auscultation bilaterally without rales, rhonchi, wheezing or diminished breath sounds.  ABDOMEN: Positive bowel sounds. Soft, nondistended, nontender. No guarding or rebound. No masses.  EXTREMITIES: No significant deformity or joint abnormality. No edema. Peripheral pulses intact. No varicosities.  SKIN: Skin normal color, texture and turgor with no lesions or rash  PSYCHIATRIC: oriented to person, place, and time, good judgement and reason, without hallucinations, abnormal affect or abnormal behaviors during the examination. Patient is not suicidal.        Last Recorded Vitals  Blood pressure 137/58, pulse 81, temperature 36.7 °C (98.1 °F), resp. rate 18, height 1.397 m (4' 7\"), weight 51.3 kg (113 lb 1.5 oz), SpO2 99%.  Intake/Output last 3 Shifts:  I/O last 3 completed shifts:  In: 480 (9.4 mL/kg) [P.O.:480]  Out: - (0 mL/kg)   Weight: 51.3 kg     Relevant Results  Lab Results   Component Value Date    WBC 5.8 08/22/2024    HGB 10.3 (L) 08/22/2024    HCT 31.6 (L) 08/22/2024    MCV 88 08/22/2024     08/22/2024      Lab Results   Component Value Date    GLUCOSE 66 (L) 08/22/2024    CALCIUM 9.4 08/22/2024     08/22/2024    K 4.5 08/22/2024    CO2 22 08/22/2024     08/22/2024    BUN 61 (H) 08/22/2024    CREATININE 10.18 " (H) 08/22/2024     Scheduled medications  apixaban, 5 mg, oral, BID  aspirin, 81 mg, oral, Daily  atorvastatin, 80 mg, oral, Nightly  B complex-vitamin C-folic acid, 1 capsule, oral, Daily  calcium acetate, 1,334 mg, oral, TID  carvedilol, 50 mg, oral, BID  cloNIDine, 0.2 mg, oral, TID  fluticasone, 2 spray, Each Nostril, Daily  fluticasone furoate-vilanteroL, 1 puff, inhalation, Daily  gabapentin, 300 mg, oral, Nightly  hydrALAZINE, 100 mg, oral, TID  isosorbide mononitrate ER, 120 mg, oral, Daily  NIFEdipine ER, 90 mg, oral, Daily before breakfast  paricalcitol, 1 mcg, intravenous, After Dialysis  polyethylene glycol, 17 g, oral, Once  valsartan, 320 mg, oral, Daily      Continuous medications     PRN medications  PRN medications: acetaminophen **OR** acetaminophen **OR** acetaminophen, diphenhydrAMINE, oxygen    Assessment/Plan     Stacey Heath is a 54 y.o. 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  Patient received multiple blood pressure medications via IV as well as p.o. and blood pressure is now 160s to 170s systolic.  Nephrology notified to schedule dialysis per regular schedule.  Following dialysis will slowly restart home blood pressure medications.     hypertensive emergency  - home BP regimen: carvedilol 50 BID, valsartan 320, nifedipine 90, isosorbide mononitrate 120, hydral 100 TID, clonidine 0.2mg TID   - Was to discharge the patient today but patient stated abdominal pain despite eating her meals. Will discharge first thing in the am     ESRD on HD  - Nephrology consulted for HD per regular TTS schedule     hx of brachial DVT  - continue home Eliquis 5 BID      acute hypoxemic respiratory failure   - on  2L NC on admission   - likely 2/2 hypertension   - wean as able   - no evidence of concomitant infection such as pna         N: renal diet  GI: none  DVT: home apixaban   CODE STATUS: full code (confirmed on admission)   NOK: iDya Jang (daughter) [phone]  Dispo: BP improved, Patient stating abdominal pain today, will keep overnight and discharge in the am     Meir Tubbs MD     The patient encounter includes all but not limited to; Evaluation of laboratory results, pertinent imaging, and vital signs. Daily updates are discussed with any consulting services and family/medical power of  as needed. The patient's discharge  process begins at admission and daily contact with the patient's TCC and SW is pertinent in their efficient and safe discharge.

## 2024-08-24 NOTE — PROGRESS NOTES
Attempted to meet with pt this morning but she was off the unit in dialysis. Plan to attempt to meet with pt another time. Care Transitions team will continue to follow for discharge planning needs.    Dorothy aRmos RN  Transitional Care Coordinator (TCC)  [phone] or [phone]

## 2024-08-24 NOTE — CARE PLAN
The patient's goals for the shift include   The clinical goals for the shift include Pt will be HDS this shift    Over the shift, the patient did make progress toward the following goals. Completed dialysis and waiting for a ride for discharge.

## 2024-08-24 NOTE — NURSING NOTE
.Report from Sending RN:    Report From: PONCHO Kincaid  Recent Surgery of Procedure: No  Baseline Level of Consciousness (LOC): A&O X3  Oxygen Use: No  Type: Room air  Diabetic: No  Last BP Med Given Day of Dialysis: none  Last Pain Med Given: tylenol 0524  Lab Tests to be Obtained with Dialysis: Yes  Blood Transfusion to be Given During Dialysis: No  Available IV Access: no  Medications to be Administered During Dialysis: No  Continuous IV Infusion Running: No  Restraints on Currently or in the Last 24 Hours: No  Hand-Off Communication: Pt had no acute event overnight, vital signs stable. Not on precaution, no BP medication given.  Dialysis Catheter Dressing: AVF  Last Dressing Change: N/A

## 2024-08-25 VITALS
BODY MASS INDEX: 26.17 KG/M2 | HEART RATE: 78 BPM | HEIGHT: 55 IN | TEMPERATURE: 97.2 F | OXYGEN SATURATION: 98 % | DIASTOLIC BLOOD PRESSURE: 71 MMHG | RESPIRATION RATE: 16 BRPM | SYSTOLIC BLOOD PRESSURE: 153 MMHG | WEIGHT: 113.1 LBS

## 2024-08-25 PROCEDURE — 2500000001 HC RX 250 WO HCPCS SELF ADMINISTERED DRUGS (ALT 637 FOR MEDICARE OP)

## 2024-08-25 PROCEDURE — 2500000001 HC RX 250 WO HCPCS SELF ADMINISTERED DRUGS (ALT 637 FOR MEDICARE OP): Performed by: STUDENT IN AN ORGANIZED HEALTH CARE EDUCATION/TRAINING PROGRAM

## 2024-08-25 PROCEDURE — RXMED WILLOW AMBULATORY MEDICATION CHARGE

## 2024-08-25 PROCEDURE — 94640 AIRWAY INHALATION TREATMENT: CPT

## 2024-08-25 RX ORDER — HYDROXYZINE HYDROCHLORIDE 25 MG/1
25 TABLET, FILM COATED ORAL EVERY 6 HOURS PRN
Qty: 30 TABLET | Refills: 0 | Status: SHIPPED | OUTPATIENT
Start: 2024-08-25

## 2024-08-25 ASSESSMENT — COGNITIVE AND FUNCTIONAL STATUS - GENERAL
MOBILITY SCORE: 24
DAILY ACTIVITIY SCORE: 24

## 2024-08-25 ASSESSMENT — PAIN SCALES - GENERAL: PAINLEVEL_OUTOF10: 0 - NO PAIN

## 2024-08-25 NOTE — CARE PLAN
The patient's goals for the shift include Patient will have no c/o of  pain Discomfort or rate pain <4 this shift    The clinical goals for the shift include pt will be HDS this shift

## 2024-08-26 ENCOUNTER — PHARMACY VISIT (OUTPATIENT)
Dept: PHARMACY | Facility: CLINIC | Age: 54
End: 2024-08-26
Payer: MEDICARE

## 2024-08-26 NOTE — DISCHARGE SUMMARY
Discharge Diagnosis  Flash pulmonary edema (Multi)    Issues Requiring Follow-Up  [ ]PCP follow-up  [ ] nephrology follow-up     Discharge Meds     Your medication list        START taking these medications        Instructions Last Dose Given Next Dose Due   polyethylene glycol 17 gram/dose powder  Commonly known as: Glycolax, Miralax  Start taking on: July 26, 2024      Take 17 g (1 scoop dissolved in liquid) by mouth once daily. Do not start before July 26, 2024.              CHANGE how you take these medications        Instructions Last Dose Given Next Dose Due   carvedilol 25 mg tablet  Commonly known as: Coreg  What changed:   medication strength  how much to take      Take 2 tablets (50 mg) by mouth 2 times a day.       cloNIDine 0.2 mg tablet  Commonly known as: Catapres  What changed:   medication strength  how much to take      Take 1 tablet (0.2 mg) by mouth 3 times a day.       valsartan 320 mg tablet  Commonly known as: Diovan  What changed:   medication strength  how much to take      Take 1 tablet (320 mg) by mouth once daily.              CONTINUE taking these medications        Instructions Last Dose Given Next Dose Due   acetaminophen 325 mg tablet  Commonly known as: Tylenol      Take 2 tablets (650 mg) by mouth every 4 hours if needed for mild pain (1 - 3) or moderate pain (4 - 6).       albuterol sulfate 90 mcg/actuation aero powdr breath act w/sensor inhaler  Commonly known as: Proair Digihaler           albuterol 1.25 mg/3 mL nebulizer solution      Take 3 mL (1.25 mg) by nebulization every 6 hours if needed for wheezing.       apixaban 5 mg tablet  Commonly known as: Eliquis      Take 1 tablet (5 mg) by mouth 2 times a day.       aspirin 81 mg EC tablet           atorvastatin 80 mg tablet  Commonly known as: Lipitor      Take 1 tablet (80 mg) by mouth once daily at bedtime.       diphenhydrAMINE 25 mg capsule  Commonly known as: BENADryl           benzonatate 100 mg capsule  Commonly known as:  Tessalon      Take 1 capsule (100 mg) by mouth 3 times a day as needed for cough. Do not crush or chew.       Breo Ellipta 100-25 mcg/dose inhaler  Generic drug: fluticasone furoate-vilanteroL      Inhale 1 puff once daily.       calcium acetate 667 mg capsule  Commonly known as: Phoslo      Take 2 capsules (1,334 mg) by mouth 3 times daily (morning, midday, late afternoon).       Deep Sea Nasal 0.65 % nasal spray  Generic drug: sodium chloride      Administer 1 spray into each nostril 4 times a day as needed for congestion.       epoetin joceline 10,000 unit/mL injection  Commonly known as: Epogen,Procrit      Inject 1 mL (10,000 Units) under the skin 3 times a week.       hydrALAZINE 50 mg tablet  Commonly known as: Apresoline           isosorbide mononitrate  mg 24 hr tablet  Commonly known as: Imdur      Take 1 tablet (120 mg) by mouth once daily. Do not crush or chew.       NIFEdipine ER 90 mg 24 hr tablet  Commonly known as: Adalat CC      Take 1 tablet (90 mg) by mouth once daily in the morning. Take before meals. Do not crush, chew, or split.       Renal Caps 1 mg capsule  Generic drug: B complex-vitamin C-folic acid      Take 1 capsule by mouth once daily.       torsemide 20 mg tablet  Commonly known as: Demadex      Take 2 tablets once daily on non-dialysis days only. Do not take on dialysis days. Do not start before July 3, 2024.              ASK your doctor about these medications        Instructions Last Dose Given Next Dose Due   fluticasone 50 mcg/actuation nasal spray  Commonly known as: Flonase      Administer 2 sprays into each nostril once daily. Shake gently. Before first use, prime pump. After use, clean tip and replace cap.                 Where to Get Your Medications        These medications were sent to [facility]  [address], [address], [place] [address]      Hours: 8AM to 6PM Mon-Fri, 8AM to 4PM Sat, 9AM to 1PM Sun Phone: [phone]   carvedilol 25 mg  tablet  cloNIDine 0.2 mg tablet  polyethylene glycol 17 gram/dose powder  valsartan 320 mg tablet         Test Results Pending At Discharge  Pending Labs       No current pending labs.            Hospital Course  Stacey Heath is a 53 y.o. 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 Upon admission to MICU, / 90 , HR 85, Sat o2 100, on 2 lit NC.. Discontinued Nitroglycerin in MICU and started on home medications, nephrology consulted for indication of emergent HD. PT eventually transferred to floors on 7/22 for ongoing management. HD was continued and O2 was weaned. Pt with uncontrolled Bps on the floor as well requiring further BP medication titration and optimization. Pt discharged home with optimized BP regimen, PCP and nephrology follow-up.       Pertinent Physical Exam At Time of Discharge  Physical Exam  Physical Exam  Constitutional:       Appearance: Normal appearance.   HENT:      Head: Normocephalic and atraumatic.   Cardiovascular:      Rate and Rhythm: Normal rate and regular rhythm.      Heart sounds: No murmur heard.  Pulmonary:      Effort: Pulmonary effort is normal. No respiratory distress.      Breath sounds: Normal breath sounds and air entry. No stridor. No wheezing or rhonchi.   Abdominal:      General: Abdomen is flat. Bowel sounds are normal. There is no distension.      Palpations: Abdomen is soft. There is no mass.      Tenderness: There is no abdominal tenderness.   Musculoskeletal:         General: No swelling. Normal range of motion.      Right lower leg: No edema.      Left lower leg: No edema.   Skin:     General: Skin is warm and dry.      Capillary Refill: Capillary refill takes less than 2 seconds.      Findings: No lesion or rash.   Neurological:      General: No focal deficit present.       Mental Status: She is alert and oriented to person, place, and time. Mental status is at baseline.      Cranial Nerves: No cranial nerve deficit.   Psychiatric:         Mood and Affect: Mood normal.         Behavior: Behavior normal.         Thought Content: Thought content normal.         Judgment: Judgment normal.      Outpatient Follow-Up  Future Appointments   Date Time Provider Department Center   8/28/2024 11:20 AM Judy Alcaraz MD [id number] [facility]   9/4/2024 12:00 PM Bess Hurst RD [id number] [facility]   10/4/2024  3:15 PM [facility] ANKUR Carlson MD

## 2024-08-27 ENCOUNTER — DOCUMENTATION (OUTPATIENT)
Dept: BEHAVIORAL HEALTH | Facility: CLINIC | Age: 54
End: 2024-08-27
Payer: COMMERCIAL

## 2024-08-28 ENCOUNTER — APPOINTMENT (OUTPATIENT)
Dept: ENDOCRINOLOGY | Facility: CLINIC | Age: 54
End: 2024-08-28
Payer: COMMERCIAL

## 2024-08-28 VITALS
DIASTOLIC BLOOD PRESSURE: 64 MMHG | HEIGHT: 55 IN | BODY MASS INDEX: 27.31 KG/M2 | SYSTOLIC BLOOD PRESSURE: 137 MMHG | WEIGHT: 118 LBS | HEART RATE: 79 BPM

## 2024-08-28 DIAGNOSIS — I1A.0 RESISTANT HYPERTENSION: Primary | ICD-10-CM

## 2024-08-28 PROCEDURE — 4010F ACE/ARB THERAPY RXD/TAKEN: CPT | Performed by: STUDENT IN AN ORGANIZED HEALTH CARE EDUCATION/TRAINING PROGRAM

## 2024-08-28 PROCEDURE — 1036F TOBACCO NON-USER: CPT | Performed by: STUDENT IN AN ORGANIZED HEALTH CARE EDUCATION/TRAINING PROGRAM

## 2024-08-28 PROCEDURE — 3008F BODY MASS INDEX DOCD: CPT | Performed by: STUDENT IN AN ORGANIZED HEALTH CARE EDUCATION/TRAINING PROGRAM

## 2024-08-28 PROCEDURE — 3048F LDL-C <100 MG/DL: CPT | Performed by: STUDENT IN AN ORGANIZED HEALTH CARE EDUCATION/TRAINING PROGRAM

## 2024-08-28 PROCEDURE — 3078F DIAST BP <80 MM HG: CPT | Performed by: STUDENT IN AN ORGANIZED HEALTH CARE EDUCATION/TRAINING PROGRAM

## 2024-08-28 PROCEDURE — 99204 OFFICE O/P NEW MOD 45 MIN: CPT | Performed by: STUDENT IN AN ORGANIZED HEALTH CARE EDUCATION/TRAINING PROGRAM

## 2024-08-28 PROCEDURE — 3044F HG A1C LEVEL LT 7.0%: CPT | Performed by: STUDENT IN AN ORGANIZED HEALTH CARE EDUCATION/TRAINING PROGRAM

## 2024-08-28 PROCEDURE — 3075F SYST BP GE 130 - 139MM HG: CPT | Performed by: STUDENT IN AN ORGANIZED HEALTH CARE EDUCATION/TRAINING PROGRAM

## 2024-08-28 NOTE — PATIENT INSTRUCTIONS
Get your labs done    The name of the medication is spironolactone   We will need to adjust your other blood meds and do frequent labs when you're on these meds       Follow up next available     Judy Alcaraz MD  Divison of Endocrinology   [facility]   Phone: [phone]    option 4, then option 1  Fax: [phone]

## 2024-08-28 NOTE — PROGRESS NOTES
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 (4/14/2024) with multiple prior admissions for hypertensive emergency    Coming in today for hypertension evaluation, referred by PCP     On dialysis for about 3 years attributed due to HTN and DM     Per patient was diagnosed with HTN in her 20s initially gestational HTN then remained persistent   During 4 pregnancies, had high blood pressure, the youngest pregnancy needed to be induced due to pre-eclampsia     Multiple admissions for hypertensive emergencies since dialysis started   Longstanding HTN but only one medication prior to dialysis     She does not miss dialysis sessions and she is compliant with meds     Current Hypertension regimen:   Hydralazine 100mg TID   Valsartan 320mg   Nifedipine 90  Clonidine 0.2mg TID   Carvedilol 25mg BID     Torsemide  Isosorbide mononitrate     DM2  >20 years   Previously on insulin       Famhx-paternal side HTN also diagnosed at a young age, DM2 daughter       Past Medical History:   Diagnosis Date    Bacteremia due to methicillin resistant Staphylococcus aureus 07/08/2024    Cardiac/pericardial tamponade ([facility]) 01/20/2024    CHF (congestive heart failure) (Multi)     COPD (chronic obstructive pulmonary disease) (Multi)     Coronary artery disease     Disorder of sweat glands 02/25/2023    Dry eye syndrome of bilateral lacrimal glands 03/07/2017    Dry eyes    ESRD (end stage renal disease) (Multi)     Essential (primary) hypertension 12/27/2022    Hypertension    History of acute pancreatitis 12/21/2020    History of acute pancreatitis    Low grade squamous intraepithelial lesion (LGSIL) on cervicovaginal cytologic smear 02/25/2023    Migraines     History of migraine    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    Type 2 myocardial infarction (Multi) 01/20/2024     Family History   Problem Relation Name  Age of Onset    Other (Cerebrovascular Accident) Father      Heart failure Paternal Grandmother      Breast cancer Paternal Grandmother      Other (Primary Cervical Cancer) Paternal Grandmother      Diabetes Paternal Grandfather      Breast cancer Father's Sister      Ovarian cancer Father's Sister       Social History     Socioeconomic History    Marital status: Single     Spouse name: Not on file    Number of children: Not on file    Years of education: Not on file    Highest education level: Not on file   Occupational History    Not on file   Tobacco Use    Smoking status: Former     Types: Cigarettes     Passive exposure: Past    Smokeless tobacco: Never   Vaping Use    Vaping status: Never Used   Substance and Sexual Activity    Alcohol use: Not Currently    Drug use: Not Currently     Types: Cocaine, Marijuana    Sexual activity: Defer   Other Topics Concern    Not on file   Social History Narrative    Not on file     Social Determinants of Health     Financial Resource Strain: Low Risk  (8/23/2024)    Overall Financial Resource Strain (CARDIA)     Difficulty of Paying Living Expenses: Not hard at all   Recent Concern: Financial Resource Strain - High Risk (5/29/2024)    Overall Financial Resource Strain (CARDIA)     Difficulty of Paying Living Expenses: Very hard   Food Insecurity: No Food Insecurity (7/15/2024)    Hunger Vital Sign     Worried About Running Out of Food in the Last Year: Never true     Ran Out of Food in the Last Year: Never true   Transportation Needs: No Transportation Needs (8/23/2024)    PRAPARE - Transportation     Lack of Transportation (Medical): No     Lack of Transportation (Non-Medical): No   Physical Activity: Sufficiently Active (7/22/2024)    Exercise Vital Sign     Days of Exercise per Week: 4 days     Minutes of Exercise per Session: 60 min   Stress: No Stress Concern Present (7/15/2024)    [employer] - Occupational Stress Questionnaire     Feeling  of Stress : Only a little   Social Connections: Moderately Integrated (7/15/2024)    Social Connection and Isolation Panel [NHANES]     Frequency of Communication with Friends and Family: More than three times a week     Frequency of Social Gatherings with Friends and Family: Once a week     Attends Mosque Services: More than 4 times per year     Active Member of Clubs or Organizations: No     Attends Club or Organization Meetings: Not on file     Marital Status: Living with partner   Intimate Partner Violence: Not At Risk (7/15/2024)    Humiliation, Afraid, Rape, and Kick questionnaire     Fear of Current or Ex-Partner: No     Emotionally Abused: No     Physically Abused: No     Sexually Abused: No   Housing Stability: Low Risk  (8/23/2024)    Housing Stability Vital Sign     Unable to Pay for Housing in the Last Year: No     Number of Times Moved in the Last Year: 1     Homeless in the Last Year: No     ROS reviewed and is negative except for pertinent findings noted on HPI    Physical Exam  Constitutional:       Appearance: Normal appearance.      Comments: No facial plethora   HENT:      Head: Normocephalic.   Abdominal:      Palpations: Abdomen is soft.   Musculoskeletal:         General: No swelling or tenderness.   Skin:     General: Skin is warm.      Comments: No violaceous striae    Neurological:      General: No focal deficit present.      Mental Status: She is alert and oriented to person, place, and time.   Psychiatric:         Mood and Affect: Mood normal.         Behavior: Behavior normal.         labs and imaging reviewed, pertinent findings listed on HPI and Impression    Problem List Items Addressed This Visit       Resistant hypertension - Primary    Relevant Orders    Renal Function Panel    Renin Activity    Aldosterone, Serum    DHEA-Sulfate    Thyroid Stimulating Hormone    Thyroxine, Free    Thyroid Stimulating Immunoglobulin     Resistant hypertension, worsened after dialysis started    Will rule out underlying hyperaldo since she was diagnosed at such a young age    Renin grace screening  She is on multiple medications that may confound results but due to multiple hospitalizations I don't think it will be safe to discontinue/or hold     No clinical signs of Cushings so will defer dex suppression, testing.  Since she has ESRD the testing will also tend to show false positive results     Follow up next available

## 2024-08-29 ENCOUNTER — APPOINTMENT (OUTPATIENT)
Dept: DIALYSIS | Facility: HOSPITAL | Age: 54
End: 2024-08-29
Payer: COMMERCIAL

## 2024-08-29 ENCOUNTER — CLINICAL SUPPORT (OUTPATIENT)
Dept: EMERGENCY MEDICINE | Facility: HOSPITAL | Age: 54
End: 2024-08-29
Payer: COMMERCIAL

## 2024-08-29 ENCOUNTER — HOSPITAL ENCOUNTER (EMERGENCY)
Facility: HOSPITAL | Age: 54
Discharge: HOME | End: 2024-08-29
Attending: EMERGENCY MEDICINE
Payer: COMMERCIAL

## 2024-08-29 ENCOUNTER — APPOINTMENT (OUTPATIENT)
Dept: RADIOLOGY | Facility: HOSPITAL | Age: 54
End: 2024-08-29
Payer: COMMERCIAL

## 2024-08-29 VITALS
BODY MASS INDEX: 27.31 KG/M2 | SYSTOLIC BLOOD PRESSURE: 150 MMHG | RESPIRATION RATE: 19 BRPM | WEIGHT: 118 LBS | TEMPERATURE: 97.5 F | HEIGHT: 55 IN | DIASTOLIC BLOOD PRESSURE: 60 MMHG | OXYGEN SATURATION: 97 % | HEART RATE: 87 BPM

## 2024-08-29 DIAGNOSIS — G89.29 CHRONIC NONINTRACTABLE HEADACHE, UNSPECIFIED HEADACHE TYPE: ICD-10-CM

## 2024-08-29 DIAGNOSIS — Z99.2 ESRD ON DIALYSIS (MULTI): ICD-10-CM

## 2024-08-29 DIAGNOSIS — R51.9 CHRONIC NONINTRACTABLE HEADACHE, UNSPECIFIED HEADACHE TYPE: ICD-10-CM

## 2024-08-29 DIAGNOSIS — E87.5 HYPERKALEMIA: ICD-10-CM

## 2024-08-29 DIAGNOSIS — R06.02 SHORTNESS OF BREATH: Primary | ICD-10-CM

## 2024-08-29 DIAGNOSIS — N18.6 ESRD ON DIALYSIS (MULTI): ICD-10-CM

## 2024-08-29 LAB
ALBUMIN SERPL BCP-MCNC: 4 G/DL (ref 3.4–5)
ALP SERPL-CCNC: 115 U/L (ref 33–110)
ALT SERPL W P-5'-P-CCNC: 15 U/L (ref 7–45)
ANION GAP BLDV CALCULATED.4IONS-SCNC: 15 MMOL/L (ref 10–25)
ANION GAP SERPL CALC-SCNC: 22 MMOL/L (ref 10–20)
AST SERPL W P-5'-P-CCNC: 16 U/L (ref 9–39)
BASE EXCESS BLDV CALC-SCNC: 1.9 MMOL/L (ref -2–3)
BASOPHILS # BLD AUTO: 0.07 X10*3/UL (ref 0–0.1)
BASOPHILS NFR BLD AUTO: 1.3 %
BILIRUB SERPL-MCNC: 0.4 MG/DL (ref 0–1.2)
BNP SERPL-MCNC: 1430 PG/ML (ref 0–99)
BODY TEMPERATURE: 37 DEGREES CELSIUS
BUN SERPL-MCNC: 59 MG/DL (ref 6–23)
CA-I BLDV-SCNC: 1.16 MMOL/L (ref 1.1–1.33)
CALCIUM SERPL-MCNC: 9.7 MG/DL (ref 8.6–10.6)
CARDIAC TROPONIN I PNL SERPL HS: 34 NG/L (ref 0–34)
CARDIAC TROPONIN I PNL SERPL HS: 38 NG/L (ref 0–34)
CHLORIDE BLDV-SCNC: 101 MMOL/L (ref 98–107)
CHLORIDE SERPL-SCNC: 98 MMOL/L (ref 98–107)
CO2 SERPL-SCNC: 24 MMOL/L (ref 21–32)
CREAT SERPL-MCNC: 9.13 MG/DL (ref 0.5–1.05)
EGFRCR SERPLBLD CKD-EPI 2021: 5 ML/MIN/1.73M*2
EOSINOPHIL # BLD AUTO: 0.55 X10*3/UL (ref 0–0.7)
EOSINOPHIL NFR BLD AUTO: 9.9 %
ERYTHROCYTE [DISTWIDTH] IN BLOOD BY AUTOMATED COUNT: 15.4 % (ref 11.5–14.5)
GLUCOSE BLD MANUAL STRIP-MCNC: 146 MG/DL (ref 74–99)
GLUCOSE BLDV-MCNC: 148 MG/DL (ref 74–99)
GLUCOSE SERPL-MCNC: 141 MG/DL (ref 74–99)
HCO3 BLDV-SCNC: 27.7 MMOL/L (ref 22–26)
HCT VFR BLD AUTO: 30 % (ref 36–46)
HCT VFR BLD EST: 29 % (ref 36–46)
HGB BLD-MCNC: 9.5 G/DL (ref 12–16)
HGB BLDV-MCNC: 9.8 G/DL (ref 12–16)
HOLD SPECIMEN: NORMAL
HOLD SPECIMEN: NORMAL
IMM GRANULOCYTES # BLD AUTO: 0.01 X10*3/UL (ref 0–0.7)
IMM GRANULOCYTES NFR BLD AUTO: 0.2 % (ref 0–0.9)
INHALED O2 CONCENTRATION: 28 %
LACTATE BLDV-SCNC: 1.7 MMOL/L (ref 0.4–2)
LYMPHOCYTES # BLD AUTO: 1.09 X10*3/UL (ref 1.2–4.8)
LYMPHOCYTES NFR BLD AUTO: 19.6 %
MAGNESIUM SERPL-MCNC: 2.32 MG/DL (ref 1.6–2.4)
MCH RBC QN AUTO: 28.4 PG (ref 26–34)
MCHC RBC AUTO-ENTMCNC: 31.7 G/DL (ref 32–36)
MCV RBC AUTO: 90 FL (ref 80–100)
MONOCYTES # BLD AUTO: 0.47 X10*3/UL (ref 0.1–1)
MONOCYTES NFR BLD AUTO: 8.5 %
NEUTROPHILS # BLD AUTO: 3.37 X10*3/UL (ref 1.2–7.7)
NEUTROPHILS NFR BLD AUTO: 60.5 %
NRBC BLD-RTO: 0 /100 WBCS (ref 0–0)
OXYHGB MFR BLDV: 66 % (ref 45–75)
PCO2 BLDV: 48 MM HG (ref 41–51)
PH BLDV: 7.37 PH (ref 7.33–7.43)
PLATELET # BLD AUTO: 197 X10*3/UL (ref 150–450)
PO2 BLDV: 44 MM HG (ref 35–45)
POTASSIUM BLDV-SCNC: 6.2 MMOL/L (ref 3.5–5.3)
POTASSIUM SERPL-SCNC: 5.5 MMOL/L (ref 3.5–5.3)
POTASSIUM SERPL-SCNC: 5.9 MMOL/L (ref 3.5–5.3)
PROT SERPL-MCNC: 7.4 G/DL (ref 6.4–8.2)
RBC # BLD AUTO: 3.34 X10*6/UL (ref 4–5.2)
SAO2 % BLDV: 67 % (ref 45–75)
SODIUM BLDV-SCNC: 137 MMOL/L (ref 136–145)
SODIUM SERPL-SCNC: 138 MMOL/L (ref 136–145)
WBC # BLD AUTO: 5.6 X10*3/UL (ref 4.4–11.3)

## 2024-08-29 PROCEDURE — 71046 X-RAY EXAM CHEST 2 VIEWS: CPT | Performed by: RADIOLOGY

## 2024-08-29 PROCEDURE — 96374 THER/PROPH/DIAG INJ IV PUSH: CPT | Mod: 59

## 2024-08-29 PROCEDURE — 93005 ELECTROCARDIOGRAM TRACING: CPT

## 2024-08-29 PROCEDURE — 83735 ASSAY OF MAGNESIUM: CPT

## 2024-08-29 PROCEDURE — 90937 HEMODIALYSIS REPEATED EVAL: CPT

## 2024-08-29 PROCEDURE — 2500000001 HC RX 250 WO HCPCS SELF ADMINISTERED DRUGS (ALT 637 FOR MEDICARE OP)

## 2024-08-29 PROCEDURE — 8010000001 HC DIALYSIS - HEMODIALYSIS PER DAY

## 2024-08-29 PROCEDURE — 82330 ASSAY OF CALCIUM: CPT

## 2024-08-29 PROCEDURE — 71046 X-RAY EXAM CHEST 2 VIEWS: CPT

## 2024-08-29 PROCEDURE — 2500000004 HC RX 250 GENERAL PHARMACY W/ HCPCS (ALT 636 FOR OP/ED)

## 2024-08-29 PROCEDURE — 2500000002 HC RX 250 W HCPCS SELF ADMINISTERED DRUGS (ALT 637 FOR MEDICARE OP, ALT 636 FOR OP/ED)

## 2024-08-29 PROCEDURE — 83880 ASSAY OF NATRIURETIC PEPTIDE: CPT

## 2024-08-29 PROCEDURE — 82947 ASSAY GLUCOSE BLOOD QUANT: CPT

## 2024-08-29 PROCEDURE — 96375 TX/PRO/DX INJ NEW DRUG ADDON: CPT

## 2024-08-29 PROCEDURE — 85025 COMPLETE CBC W/AUTO DIFF WBC: CPT

## 2024-08-29 PROCEDURE — 84484 ASSAY OF TROPONIN QUANT: CPT

## 2024-08-29 PROCEDURE — 82435 ASSAY OF BLOOD CHLORIDE: CPT

## 2024-08-29 PROCEDURE — 2500000005 HC RX 250 GENERAL PHARMACY W/O HCPCS

## 2024-08-29 PROCEDURE — 99284 EMERGENCY DEPT VISIT MOD MDM: CPT | Mod: 25

## 2024-08-29 PROCEDURE — 84132 ASSAY OF SERUM POTASSIUM: CPT

## 2024-08-29 PROCEDURE — 36415 COLL VENOUS BLD VENIPUNCTURE: CPT

## 2024-08-29 RX ORDER — ACETAMINOPHEN 325 MG/1
975 TABLET ORAL ONCE
Status: COMPLETED | OUTPATIENT
Start: 2024-08-29 | End: 2024-08-29

## 2024-08-29 RX ORDER — NIFEDIPINE 30 MG/1
90 TABLET, FILM COATED, EXTENDED RELEASE ORAL ONCE
Status: COMPLETED | OUTPATIENT
Start: 2024-08-29 | End: 2024-08-29

## 2024-08-29 RX ORDER — NITROGLYCERIN 0.4 MG/1
0.4 TABLET SUBLINGUAL ONCE
Status: COMPLETED | OUTPATIENT
Start: 2024-08-29 | End: 2024-08-29

## 2024-08-29 RX ORDER — ACETAMINOPHEN 325 MG/1
TABLET ORAL
Status: COMPLETED
Start: 2024-08-29 | End: 2024-08-29

## 2024-08-29 RX ORDER — FUROSEMIDE 10 MG/ML
40 INJECTION INTRAMUSCULAR; INTRAVENOUS ONCE
Status: COMPLETED | OUTPATIENT
Start: 2024-08-29 | End: 2024-08-29

## 2024-08-29 RX ORDER — HYDRALAZINE HYDROCHLORIDE 25 MG/1
100 TABLET, FILM COATED ORAL ONCE
Status: COMPLETED | OUTPATIENT
Start: 2024-08-29 | End: 2024-08-29

## 2024-08-29 RX ORDER — CARVEDILOL 12.5 MG/1
25 TABLET ORAL ONCE
Status: COMPLETED | OUTPATIENT
Start: 2024-08-29 | End: 2024-08-29

## 2024-08-29 RX ORDER — DEXTROSE 50 % IN WATER (D50W) INTRAVENOUS SYRINGE
25 ONCE
Status: COMPLETED | OUTPATIENT
Start: 2024-08-29 | End: 2024-08-29

## 2024-08-29 RX ORDER — CLONIDINE HYDROCHLORIDE 0.1 MG/1
0.2 TABLET ORAL ONCE
Status: COMPLETED | OUTPATIENT
Start: 2024-08-29 | End: 2024-08-29

## 2024-08-29 RX ORDER — PARICALCITOL 5 UG/ML
1 INJECTION, SOLUTION INTRAVENOUS 3 TIMES WEEKLY
Status: DISCONTINUED | OUTPATIENT
Start: 2024-08-29 | End: 2024-08-29 | Stop reason: HOSPADM

## 2024-08-29 RX ORDER — ACETAMINOPHEN 500 MG
1000 TABLET ORAL EVERY 6 HOURS PRN
Qty: 30 TABLET | Refills: 0 | Status: SHIPPED | OUTPATIENT
Start: 2024-08-29 | End: 2024-09-08

## 2024-08-29 ASSESSMENT — PAIN SCALES - GENERAL
PAINLEVEL_OUTOF10: 0 - NO PAIN
PAINLEVEL_OUTOF10: 0 - NO PAIN
PAINLEVEL_OUTOF10: 6
PAINLEVEL_OUTOF10: 6

## 2024-08-29 ASSESSMENT — PAIN DESCRIPTION - DESCRIPTORS
DESCRIPTORS: SQUEEZING
DESCRIPTORS: SQUEEZING

## 2024-08-29 ASSESSMENT — PAIN SCALES - PAIN ASSESSMENT IN ADVANCED DEMENTIA (PAINAD)
FACIALEXPRESSION: SMILING OR INEXPRESSIVE
CONSOLABILITY: NO NEED TO CONSOLE
TOTALSCORE: 0
BODYLANGUAGE: NORMAL
BODYLANGUAGE: RELAXED

## 2024-08-29 ASSESSMENT — PAIN DESCRIPTION - PAIN TYPE: TYPE: ACUTE PAIN

## 2024-08-29 ASSESSMENT — LIFESTYLE VARIABLES
HAVE PEOPLE ANNOYED YOU BY CRITICIZING YOUR DRINKING: NO
EVER HAD A DRINK FIRST THING IN THE MORNING TO STEADY YOUR NERVES TO GET RID OF A HANGOVER: NO
EVER FELT BAD OR GUILTY ABOUT YOUR DRINKING: NO
TOTAL SCORE: 0
HAVE YOU EVER FELT YOU SHOULD CUT DOWN ON YOUR DRINKING: NO

## 2024-08-29 ASSESSMENT — PAIN DESCRIPTION - LOCATION: LOCATION: CHEST

## 2024-08-29 NOTE — PROGRESS NOTES
Emergency Department Transition of Care Note       Signout   I received Stacey Heath in signout from Dr. Sara Brink.  Please see the ED Provider Note for all HPI, PE and MDM up to the time of signout at 1500.  This is in addition to the primary record.    In brief Stacey Heath is an 54 y.o. female presenting for shortness of breath.  She was found to have a new oxygen requirement and was significantly hypertensive upon arrival.  She was given a sublingual nitroglycerin as well as a dose of Lasix after she was found to have B-lines on point-of-care ultrasound.  She was also given her home hypertensive medications and her blood pressure did improved.  She was sent to dialysis and at the time of signout we are awaiting reevaluation when she returns and final disposition.    ED Course & Medical Decision Making   Medical Decision Making:  Under my care, the patient was at dialysis and upon returning complain of a mild headache but overall felt significantly improved from a respiratory standpoint.  See ED course below for further evaluation and final disposition.    ED Course:  ED Course as of 08/30/24 0014   u Aug 29, 2024   0826 Patient coming back in with sob and HTN. HD stable but hypertensive on exam. Sat 100%. Few B lines on US. Plan for IV placement, SL nitroglycerin, labs, and possible repeat admission.  [DM]   1842 The patient is complaining of a moderate headache which was gradual in onset during dialysis and states that this is not the worst headache of her life and does not appear to be consistent with subarachnoid hemorrhage.  She does feel comfortable with discharge home at this time and was provided with a prescription for Tylenol for her headaches which was sent to their pharmacy.  Strict return precautions were provided including severe/crushing chest pain, shortness of breath on exertion, syncope, severe/sudden onset headaches or strokelike symptoms.  The patient verbalized their understanding  of this and was discharged in stable condition.  Instructions to follow-up with their primary care provider within 5-7 days were provided. [RS]      ED Course User Index  [DM] Jerel Peña MD  [RS] Steve Majano DO         Diagnoses as of 08/30/24 0014   Shortness of breath   ESRD on dialysis (Multi)   Hyperkalemia   Chronic nonintractable headache, unspecified headache type       Disposition   As a result of the work-up, the patient was discharged home.  she was informed of her diagnosis and instructed to come back with any concerns or worsening of condition.  she and was agreeable to the plan as discussed above.  she was given the opportunity to ask questions.  All of the patient's questions were answered.    Procedures   Procedures    Patient seen and discussed with ED attending physician.    Steve Majano DO  Emergency Medicine

## 2024-08-29 NOTE — NURSING NOTE
.Report to Receiving RN:    Report To: PONCHO Brown  Time Report Called: 0352  Hand-Off Communication: Pt dialysis completed. Complained not feeling good, UF off, was turn back on after 30 min. Having headache  Notified. Fluid remove 0.5 Liter Post /79 HR 88  Complications During Treatment: Yes, headache  Ultrafiltration Treatment: No  Medications Administered During Dialysis: No  Blood Products Administered During Dialysis: No  Labs Sent During Dialysis: No  Heparin Drip Rate Changes: No  Dialysis Catheter Dressing: AVF  Last Dressing Change: N/A

## 2024-08-29 NOTE — NURSING NOTE
.Report from Sending RN:    Report From: PONCHO Davies  Recent Surgery of Procedure: No  Baseline Level of Consciousness (LOC): A&O X3  Oxygen Use: Yes  Type: 3L NC  Diabetic: No  Last BP Med Given Day of Dialysis: coreg, clonidine  Last Pain Med Given: tylenol  Lab Tests to be Obtained with Dialysis: No  Blood Transfusion to be Given During Dialysis: No  Available IV Access: Yes  Medications to be Administered During Dialysis: No  Continuous IV Infusion Running: No  Restraints on Currently or in the Last 24 Hours: No  Hand-Off Communication: Pt had no acute event overnight, came in for SOB. All BP medication given, not on precaution  Dialysis Catheter Dressing: AVF  Last Dressing Change: N/A

## 2024-08-29 NOTE — DISCHARGE INSTRUCTIONS
You have been diagnosed with hypertension, shortness of breath and elevated potassium in the emergency department today.  We treated your elevated potassium with medications as well as dialysis and this improved.  Your blood pressure did improve with dialysis as well.  A prescription for Tylenol has been sent to your pharmacy for your headaches.  Please use this medication as instructed by your pharmacist.  You should follow-up with your primary care provider within 5-7 days.  Please return to the emergency department if you begin experiencing any severe crushing chest pain, shortness of breath on exertion, passout, severe/sudden onset headache, or strokelike symptoms.

## 2024-08-29 NOTE — ED PROVIDER NOTES
Emergency Department Provider Note        History of Present Illness     History provided by: Patient  Limitations to History: None    HPI:  Patient is a 54-year-old female with a past medical history of ESRD secondary to poorly controlled hypertension, dialysis Tuesday Thursday Saturday, heart failure, COPD, upper extremity DVT on Eliquis presenting to the ED for shortness of breath.  Patient was recently discharged from the MICU 3 weeks ago after hypertensive emergency.  Patient states having chest pain and shortness of breath that started last night.  Patient states that she is compliant with her Eliquis.  Patient states the chest pain is midsternal with no radiation.  Patient states she does not know what makes it better or worse.  Physical Exam   Triage vitals:  T 36.1 °C (96.9 °F)  HR 82  /77  RR 16  O2 96 % None (Room air)    Physical Exam  Constitutional:       Appearance: Normal appearance.   HENT:      Head: Normocephalic.   Eyes:      Extraocular Movements: Extraocular movements intact.   Cardiovascular:      Rate and Rhythm: Normal rate and regular rhythm.   Pulmonary:      Effort: Pulmonary effort is normal.      Breath sounds: No decreased breath sounds or wheezing.   Abdominal:      General: Abdomen is flat.      Palpations: Abdomen is soft.   Musculoskeletal:         General: Normal range of motion.      Cervical back: Normal range of motion.   Skin:     General: Skin is warm.   Neurological:      Mental Status: She is alert. Mental status is at baseline.   Psychiatric:         Mood and Affect: Mood normal.         Behavior: Behavior normal.          Medical Decision Making & ED Course   Medical Decision Making:  Patient is a 54-year-old female presenting to the ED for shortness of breath.  Patient has been to the ED multiple times for shortness of breath secondary to her poor hypertension control.  Patient was recently discharged in the MICU when she was admitted on BiPAP for her shortness  of breath.  On arrival patient was hypertensive 210/109.  Patient was given sublingual nitro and an IV was placed.  Patient had mild B-lines on point-of-care ultrasound as well as a plethoric IVC.  Patient states she does produce urine and was given 40 IV Lasix.  Patient was placed on 2 L of nasal cannula for relief.  Chest x-ray shows pulmonary edema.  BNP is elevated at 1430 however previous BNP's seem to be in a similar range or higher.  Troponin 34, 38.  Patient had a potassium of 5.9 and was given insulin and dextrose.  Patient was given her home medications for her hypertension which she states she is compliant with.  Patient is due to get dialysis today and therefore dialysis coordinator was contacted.  Patient is due for dialysis at 1:30 PM patient was signed out pending reevaluation and dialysis.  ----           EKG Independent Interpretation:   Heart rate 82, normal sinus rhythm, LVH present, inversions seen in leads I and II, resolution of T wave inversions seen in previous EKG August 22, new inversions seen in aVL.  T waves in the anterior leads look peaked however similar to previous EKGs      The patient was discussed with the following consultants/services: None           Disposition   Patient was signed out to oncoming provider pending completion of their work-up.  Please see the next provider's transition of care note for the remainder of the patient's care.     Procedures   Procedures    Patient seen and discussed with ED attending physician.    Sara Brink MD  Emergency Medicine       Sara Brink MD  Resident  08/29/24 0363    I saw and evaluated the patient. I personally obtained the key and critical portions of the history and physical exam or was physically present for key and critical portions performed by the resident/fellow/MAURILIO. I reviewed the resident/fellow/MAURILIO's documentation and discussed the patient with the resident/fellow/MAURILIO. I agree with the resident/fellow/MAURILIO's medical  decision making as documented in the note.    ** Please excuse any errors in grammar or translation related to this dictation. Voice recognition software was utilized to prepare this document. **       Jerel Peña MD  [facility] Emergency Medicine        Jerel Peña MD  09/02/24 [id number]

## 2024-08-30 ENCOUNTER — DOCUMENTATION (OUTPATIENT)
Dept: BEHAVIORAL HEALTH | Facility: CLINIC | Age: 54
End: 2024-08-30
Payer: COMMERCIAL

## 2024-08-30 DIAGNOSIS — F41.9 ANXIETY: ICD-10-CM

## 2024-08-30 LAB
ATRIAL RATE: 82 BPM
P AXIS: 53 DEGREES
P OFFSET: 191 MS
P ONSET: 143 MS
PR INTERVAL: 152 MS
Q ONSET: 219 MS
QRS COUNT: 14 BEATS
QRS DURATION: 82 MS
QT INTERVAL: 372 MS
QTC CALCULATION(BAZETT): 434 MS
QTC FREDERICIA: 412 MS
R AXIS: 27 DEGREES
T AXIS: 173 DEGREES
T OFFSET: 405 MS
VENTRICULAR RATE: 82 BPM

## 2024-08-30 PROCEDURE — H2019 THER BEHAV SVC, PER 15 MIN: HCPCS | Mod: HE | Performed by: COMMUNITY/BEHAVIORAL HEALTH

## 2024-08-30 NOTE — PROGRESS NOTES
Stacey Heath  Age: 54 y.o.  MRN: [id number]  Date: 8/30/2024  Location of service: in community    Program Details  Medicaid Community Clinical Case Management  Status: Enrolled  Effective Dates: 10/17/2023 - present  Responsible Staff: NAREN Davidson      Goals Reviewed:  Problem: Anxiety       Goal: Attempts to manage anxiety with help       Priority: High         Goal: Verbalizes ways to manage anxiety       Priority: High         Goal: Implements measures to reduce anxiety       Priority: High        Problem: Financial Stressors       Goal: Assistance with financial concerns           Problem: Risk of Uncoordinated Care       Goal: Care will be Coordinated and Supported by a Multidisciplinary Team of Providers       Priority: High          Summary:  This provider met with patient at the patient's home. This provider used active listening as patient explained the way she prepares her meals in order to not have a lot of sodium. This provider reviewed the nutritional guidelines with the patient that was provided by the RD on the team. Patient also informed provider that she is having special blood test completed at the suggestion of the endocrinologist to rule out a certain type of health condition since the patient has struggled with her blood pressure since the age of 21.                      NAREN Davidson

## 2024-09-03 NOTE — DOCUMENTATION CLARIFICATION NOTE
PATIENT:               EM[facility] JACKSON  [id number]  MRN:                       [id number]  :                       1970  ADMIT DATE:       2024 11:31 PM  DISCH DATE:        2024 2:54 PM  RESPONDING PROVIDER #:        [id number]          PROVIDER RESPONSE TEXT:    Type II MI    CDI QUERY TEXT:    Clarification        Instruction:    Based on your assessment of the patient and the clinical information, please provide the requested documentation by clicking on the appropriate radio button and enter any additional information if prompted.    Question: Is there a diagnosis indicative of the patient elevated Troponins and symptoms    When answering this query, please exercise your independent professional judgment. The fact that a question is being asked, does not imply that any particular answer is desired or expected.    The patient's clinical indicators include:  Clinical Information:  54-year-old female history of ESRD, HTN, DM2, HFpEF, COPD, Type 2 myocardial infarction in January, DVT on Eliquis presenting with SOB and HTN ER.  //109.  Differential includes flash pulmonary edema in the setting of ESRD, heart failure exacerbation, ACS.    Clinical Information:   ED:  VS:  36.5   109     28     189/98 - 244/109       93 percent  ? Started bipap for acute resp distress, she is not hypoxic  Point-of-care ultrasound was obtained which shows B-lines extensively throughout bilateral lung fields.  Lab work overall remained consistent showing signs of fluid overload with an elevated BNP and  likely type II demand ischemia with elevated troponins of 55 and 61.  She denies any significant chest pain associated with this. ?    EKG:  Rate of 81 bpm, regular rhythm, normal axis, normal intervals, mild ST elevations noted in leads V1 and V2 T wave inversions noted in leads II, III, aVF, V5 and V6.  T wave inversions are seen in prior EKGs as well as mild T wave inversions  especially when compared to EKG obtained August 10, 2024.    Home BP regimen: carvedilol 50 BID, valsartan 320, nifedipine 90, isosorbide mononitrate 120, hydral 100 TID, clonidine 0.2mg TID    8/21:  BNP 1,532  8/21-8/22:  Troponin trend 55   61    Treatment:  bipap,  40 mg IV Lasix x1,  nitrostat 0.4 mg x2,  escalating doses of labetalol (20, 40) as well as 1 dose of hydralazine 100 mg with improvement then tid,  clonidine 0.2 mg po x1 then tid  Risk Factors:  HTN ER, hx type 2 MI, ESRD on HD, DM, HFpEF  Options provided:  -- NSTEMI  -- Type II MI  -- Acute Myocardial Injury  -- Chronic Myocardial Injury  -- Other - I will add my own diagnosis  -- Refer to Clinical Documentation Reviewer    Query created by: Ramila Herrera on 8/30/2024 2:26 PM      Electronically signed by:  MARY JO SANCHEZ MD 9/3/2024 7:36 AM

## 2024-09-03 NOTE — PROGRESS NOTES
Stacey Heath  Age: 54 y.o.  MRN: [id number]  Date: 8/27/2024  Location of service: phone call    Program Details  Medicaid Community Clinical Case Management  Status: Enrolled  Effective Dates: 10/17/2023 - present  Responsible Staff: NAREN Davidson      Goals Reviewed:      Summary:  This writer calls patient to inform patient that this writer will be unable to meet with the patient for her endo appointment tomorrow d/t this writer being out of the office. This writer schedules an appointment with the patient at this time.    Appointment start time: 1234  Appointment completion time: 1238  Total time spent with patient (in minutes): 4  Non-Billable Time: 4  Billable Time Total: 0    Roberta Gallego RN

## 2024-09-04 ENCOUNTER — DOCUMENTATION (OUTPATIENT)
Dept: BEHAVIORAL HEALTH | Facility: CLINIC | Age: 54
End: 2024-09-04

## 2024-09-04 ENCOUNTER — APPOINTMENT (OUTPATIENT)
Dept: CARE COORDINATION | Facility: CLINIC | Age: 54
End: 2024-09-04
Payer: COMMERCIAL

## 2024-09-04 DIAGNOSIS — F41.9 ANXIETY: ICD-10-CM

## 2024-09-04 NOTE — PROGRESS NOTES
Stacey Heath  Age: 54 y.o.  MRN: [id number]  Date: 9/4/2024  Location of service: in community    Program Details  Medicaid Community Clinical Case Management  Status: Enrolled  Effective Dates: 10/17/2023 - present  Responsible Staff: NAREN Davidson      Goals Reviewed:  Problem: Anxiety       Goal: Attempts to manage anxiety with help       Priority: High         Goal: Verbalizes ways to manage anxiety       Priority: High         Goal: Implements measures to reduce anxiety       Priority: High        Problem: Financial Stressors       Goal: Assistance with financial concerns             Problem: Negative Experience, Conflict with, or Distrust of Providers and/or Health System       Goal: Plan to Address Patient Specific Negative Experience, Distrust, or Conflict with Providers and/or Health System       Priority: High        Problem: Risk of Uncoordinated Care       Goal: Care will be Coordinated and Supported by a Multidisciplinary Team of Providers       Priority: High          Summary:  This provider met with patient at the patient's home. This provider listened with empathy as patient explained that she is nervous about no one being at the house during the day just in case she becomes ill. The patient also explained that she may try to apply for some remote work from home jobs now that she is done caring for the grand baby. This provider also assisted patient with logging into her [employer] so that she could participate in a virtual appointment with the RD on the team. Patient reports that she is feeling pretty good at the moment and that her dialysis is going well at the new location.                      NAREN Davidson

## 2024-09-04 NOTE — PROGRESS NOTES
INITIAL NUTRITION EDUCATION VISIT    Reason for Nutrition Visit:  Pt is a 54 y.o. female being seen Virtual referred for  renal diet/ low Na diet    Past Medical Hx:  Patient Active Problem List   Diagnosis    Anxiety    Astigmatism    Carotid bruit    Diabetes mellitus type 2, uncomplicated (Multi)    Coronary artery disease involving native coronary artery    Nephropathy    Essential hypertension    Iron deficiency anemia    Low grade squamous intraepith lesion on cytologic smear cervix (lgsil)    Migraine    Neuropathy    Nicotine use disorder    Other pericardial effusion (noninflammatory) (HHS-HCC)    Sweating abnormality    Transplant    Acute pulmonary edema with heart disease (Multi)    Polyneuropathy due to type 2 diabetes mellitus (Multi)    Nuclear senile cataract of both eyes    Normocytic normochromic anemia    Low back pain    Hidradenitis    Complex dyslipidemia    Chronic heart failure with preserved ejection fraction (HFpEF) (Multi)    Malnutrition of moderate degree (Multi)    Shortness of breath    Kidney transplant candidate    Nonrheumatic mitral valve regurgitation    Chronic deep vein thrombosis (DVT) of brachial vein of left upper extremity (Multi)    Acute diastolic CHF (congestive heart failure) (Multi)    Flash pulmonary edema (Multi)    Hypervolemia, unspecified hypervolemia type    Acute pulmonary edema (Multi)    Hypertensive emergency    Resistant hypertension        Current Medications:   Current Outpatient Medications on File Prior to Visit   Medication Sig Dispense Refill    acetaminophen (Tylenol) 500 mg tablet Take 2 tablets (1,000 mg) by mouth every 6 hours if needed for moderate pain (4 - 6), mild pain (1 - 3) or headaches for up to 10 days. 30 tablet 0    albuterol 1.25 mg/3 mL nebulizer solution Take 3 mL (1.25 mg) by nebulization every 6 hours if needed for wheezing. 90 mL 11    albuterol sulfate (Proair Digihaler) 90 mcg/actuation aero powdr breath act w/sensor inhaler  Inhale 2 puffs 2 times a day as needed for wheezing or shortness of breath.      apixaban (Eliquis) 5 mg tablet Take 1 tablet (5 mg) by mouth 2 times a day. 60 tablet 2    aspirin 81 mg EC tablet Take 1 tablet (81 mg) by mouth once daily.      atorvastatin (Lipitor) 80 mg tablet Take 1 tablet (80 mg) by mouth once daily at bedtime. 30 tablet 11    B complex-vitamin C-folic acid (Nephrocaps) 1 mg capsule Take 1 capsule by mouth once daily. 30 capsule 2    calcium acetate (Phoslo) 667 mg capsule Take 2 capsules (1,334 mg) by mouth 3 times daily (morning, midday, late afternoon). 180 capsule 0    carvedilol (Coreg) 25 mg tablet Take 2 tablets (50 mg) by mouth 2 times a day. 120 tablet 1    cloNIDine (Catapres) 0.2 mg tablet Take 1 tablet (0.2 mg) by mouth 3 times a day. 90 tablet 1    diphenhydrAMINE (BENADryl) 25 mg capsule Take 1 capsule (25 mg) by mouth as needed at bedtime for itching or sleep.      epoetin joceline (Epogen,Procrit) 10,000 unit/mL injection Inject 1 mL (10,000 Units) under the skin 3 times a week.      fluticasone (Flonase) 50 mcg/actuation nasal spray Administer 2 sprays into each nostril once daily. Shake gently. Before first use, prime pump. After use, clean tip and replace cap. 16 g 12    fluticasone furoate-vilanteroL (Breo Ellipta) 100-25 mcg/dose inhaler Inhale 1 puff once daily. 60 each 11    gabapentin (Neurontin) 300 mg capsule Take 1 capsule (300 mg) by mouth once daily at bedtime.      hydrALAZINE (Apresoline) 100 mg tablet Take 1 tablet (100 mg) by mouth 3 times a day. 90 tablet 1    hydrOXYzine HCL (Atarax) 25 mg tablet Take 1 tablet (25 mg) by mouth every 6 hours if needed for anxiety. 30 tablet 0    isosorbide mononitrate ER (Imdur) 120 mg 24 hr tablet Take 1 tablet (120 mg) by mouth once daily. Do not crush or chew. 30 tablet 1    NIFEdipine ER (Adalat CC) 90 mg 24 hr tablet Take 1 tablet (90 mg) by mouth once daily in the morning. Take before meals. Do not crush, chew, or split. 30  "tablet 1    polyethylene glycol (Glycolax, Miralax) 17 gram/dose powder Take 17 g (1 scoop dissolved in liquid) by mouth once daily. Do not start before July 26, 2024. 510 g 0    sodium chloride (Ocean) 0.65 % nasal spray Administer 1 spray into each nostril 4 times a day as needed for congestion. 44 mL 12    torsemide (Demadex) 20 mg tablet Take 2 tablets once daily on non-dialysis days only. Do not take on dialysis days 60 tablet 2    valsartan (Diovan) 320 mg tablet Take 1 tablet (320 mg) by mouth once daily. 30 tablet 1    [DISCONTINUED] acetaminophen (Tylenol) 325 mg tablet Take 2 tablets (650 mg) by mouth every 4 hours if needed for mild pain (1 - 3) or moderate pain (4 - 6). 30 tablet 0     No current facility-administered medications on file prior to visit.       Food & Nutrition Related History:  Dietary Considerations:  32 oz FR and renal diet  Food Allergies: Shellfish and citrus   Intolerance: none  Appetite: Fluctuates \"a lot\" but lately has been feeling ok   GI Symptoms : nausea, vomiting, diarrhea, constipation, abdominal pain, and cramping. The last time she experienced this was 2 wks ago, vomited \"yellow bile\"  Swallowing Difficulty: No problems with swallowing  Chewing no problems chewing. Doesn't eat with them \"can't taste my food\". No issues tolerating any food without dentures except peanuts \"but I don't eat those anyway\"  Food Preparation: Partner/Spouse  Cooking Skills/Barriers: None reported  Grocery Shopping: Patient  Participates in [employer] but account is on hold until she finishes recertification.   Supplements: Denies     24 Diet Recall:  Meal 1: 8a cup of yogurt [employer]ik[employer] fat free yogurt strawberry flavored + half 32oz water bottle of hot tea   Meal 2: 4:30 filet-o-fish sandwich from [employer] after dialysis without cheese or ibrahima sauce   Meal 3: 9p salad w/ blue cheese, fruit, chicken, blue cheese dressing + \"less than half of 32oz bottle\" of water     Snacks:  denies  Beverages: pt is " "adamant that she consumes less than 32 oz of fluid/day or at least no more than this. Has a [facility] 32oz water bottle that she uses to measure her fluid intake t/o day.  Eating out:  at least 3 days/week after dialysis   Alcohol Intake:  did not review   Self-Identified Challenges: fluctuating appetite, GI issues (see above) and diet restrictions for DM and kidney health     Physical Activity:  not assessed    Labs:  Lab Results   Component Value Date    HGBA1C 5.2 06/29/2024    HGBA1C 3.7 03/29/2024    HGBA1C 4.2 01/16/2024     08/29/2024    K 5.5 (H) 08/29/2024    CL 98 08/29/2024    CO2 24 08/29/2024    BUN 59 (H) 08/29/2024    CREATININE 9.13 (H) 08/29/2024    CALCIUM 9.7 08/29/2024    ALBUMIN 4.0 08/29/2024    PROT 7.4 08/29/2024    BILITOT 0.4 08/29/2024    ALKPHOS 115 (H) 08/29/2024    ALT 15 08/29/2024    AST 16 08/29/2024    GLUCOSE 141 (H) 08/29/2024    BHYDRXBUT 0.08 12/16/2019    CPEPTIDE 10.0 (H) 03/29/2024     Lab Results   Component Value Date    CHOL 116 06/29/2024    LDLCALC 37 06/29/2024    TRIG 56 06/29/2024    HDL 68.2 06/29/2024    LDLF 99 12/12/2020        Comments:     BUN/Cr: pt getting dialysis at [facility]   K: reviewed    A1C: well controlled    Anthropometrics:   Ht Readings from Last 1 Encounters:   08/29/24 1.397 m (4' 7\")      BMI Readings from Last 1 Encounters:   08/29/24 27.43 kg/m²      Wt Readings from Last 10 Encounters:   08/29/24 53.5 kg (118 lb)   08/28/24 53.5 kg (118 lb)   08/23/24 51.3 kg (113 lb 1.5 oz)   08/12/24 54 kg (119 lb 0.8 oz)   08/09/24 63.4 kg (139 lb 12.4 oz)   07/27/24 50.3 kg (111 lb)   07/25/24 51.5 kg (113 lb 8.6 oz)   07/08/24 54.4 kg (119 lb 14.9 oz)   07/01/24 58.3 kg (128 lb 8.5 oz)   06/04/24 54.4 kg (120 lb)      Weight change:  6.3% wt gain x 1mo, no sig change x 3 and 6 mo.  Significant Weight Change: Yes  States her weight is usllay btwn 53-55 kg at dialysis this is before and after.     Visit Summary     Spoke to pt today. Confirmed she is on a " "32oz/day FR. She measures this using a 32oz [facility] hosp cup \"with lines on it\". States she is following a renal diet and doesn't exceed her FR. Had worked with RD in the past on DM diet, also met with RD for renal diet edu at [facility] which is where she got HD before moving to [facility] (on [address]) 2 wks ago. At that time was given a recipe book for renal diet was able to voice to this RD that the recipe book included baking not frying, using more vegetable oil and limiting tomatoes/ tomato products likes pasta sauce.     This RD spend most of today edu pt on following a low Na diet. Per pt she believes rising BP is contributing to her fluid retention not exceed FR. Reviewed 24hr meal recall above, gets fast food after HD each time. Is hungry by then but runs errands afterward session so she has to be able to eat on the go.    Edu pt on sources of hidden Na in foods. Pt doesn't use added salt to food or when cooking. Edu pt on difference btwn foods high in added sugar and high in Na- thought mustard was a \"free food\" based on prior edu from RD re: DM. Pt should aim to use condiments that do not contain more than 140mg Na/srvg then not exceed that srvg. Pt voiced understanding. Was able to answer all of pt's questions at this time.     Wants recipes on renal diet   Call [facility] to get labs      Nutrition Diagnosis:  Diagnosis Statement 1:  Diagnosis Status: Ongoing  Diagnosis : Altered nutrition related lab values  related to  lack of nutrition related knowledge   as evidenced by  elevated renal labs    Diagnosis Statement 2:  Diagnosis Status: Ongoing  Diagnosis : Food and nutrition related knowledge deficit related to  confusion btwn DM and HTN nutrition rec'd  as evidenced by  pt thought mustard was a free food    Nutrition Interventions:  Provided nutrition education on the following topic(s): Decreased Sodium  using ACONutritionCounseling: Goal Setting and Motivational Interviewing  Referred pt to Coordination of Care: " None  Discussed with pt? Yes  Provided handouts on:     Nutrition Goals:  Nutrition Goals : Decrease sodium intake by packing chicken salad sandwich on days she gets dialysis in an effort to decrease fast food intake  Nutrition Goals : cont to follow daily FR and low Na diet   Nutrition Goals : this RD will reach out to [facility], will send renal recipe resources to [facility] LSW to provide pt per pt's request    Readiness to Change : Good  Level of Understanding : Good  Anticipated Compliant : Good    Follow up Plan  Will follow-up x 2 wk

## 2024-09-06 ENCOUNTER — DOCUMENTATION (OUTPATIENT)
Dept: BEHAVIORAL HEALTH | Facility: CLINIC | Age: 54
End: 2024-09-06
Payer: COMMERCIAL

## 2024-09-11 ENCOUNTER — APPOINTMENT (OUTPATIENT)
Dept: RESPIRATORY THERAPY | Facility: HOSPITAL | Age: 54
End: 2024-09-11
Payer: COMMERCIAL

## 2024-09-11 ENCOUNTER — DOCUMENTATION (OUTPATIENT)
Dept: BEHAVIORAL HEALTH | Facility: CLINIC | Age: 54
End: 2024-09-11
Payer: COMMERCIAL

## 2024-09-11 DIAGNOSIS — F41.9 ANXIETY: ICD-10-CM

## 2024-09-11 NOTE — PROGRESS NOTES
"Stacey Heath  Age: 54 y.o.  MRN: [id number]  Date: 9/6/2024  Location of service: in community    Program Details  Medicaid Community Clinical Case Management  Status: Enrolled  Effective Dates: 10/17/2023 - present  Responsible Staff: NAREN Davidson      Goals Reviewed:  Problem: Risk of Uncoordinated Care       Goal: Care will be Coordinated and Supported by a Multidisciplinary Team of Providers       Priority: High          Summary:  This writer meets with patient at her home for a community visit.   Patient discusses no longer babysitting her grandson, patient states she feels both good and bad about this, she states that she will miss watching him during the week, but is more comfortable knowing that she won't have a medical emergency with him around.     Billable:  Patient motions to her 32oz cup and states she only drinks one of them throughout the day.  This writer discusses patient's sodium intake with patient. Patient becomes frustrated, stating, \"Like I tell everyone, I don't eat a lot of sodium!\" patient states she is tired of being asked about her sodium intake because she is asked frequently. Patient explains that she is not upset with this writer, but reports feeling frustrated by the question.   Patient runs through what she traditionally cooks with to this writer.    Appointment start time: 1325  Appointment completion time: 1350  Total time spent with patient (in minutes): 25  Non-Billable Time: 10  Billable Time Total: 15    Roberta Gallego RN    "

## 2024-09-11 NOTE — PROGRESS NOTES
Stacey Heath  Age: 54 y.o.  MRN: [id number]  Date: 9/11/2024  Location of service: in community    Program Details  Medicaid Community Clinical Case Management  Status: Enrolled  Effective Dates: 10/17/2023 - present  Responsible Staff: NAREN Davidson      Goals Reviewed:  Problem: Anxiety       Goal: Attempts to manage anxiety with help       Priority: High         Goal: Verbalizes ways to manage anxiety       Priority: High         Goal: Implements measures to reduce anxiety       Priority: High        Problem: Financial Stressors       Goal: Assistance with financial concerns         Problem: Kidney Issues       Goal: Improve health to get on kidney transplant list       Priority: High        Problem: Negative Experience, Conflict with, or Distrust of Providers and/or Health System       Goal: Plan to Address Patient Specific Negative Experience, Distrust, or Conflict with Providers and/or Health System       Priority: High        Problem: Risk of Uncoordinated Care       Goal: Care will be Coordinated and Supported by a Multidisciplinary Team of Providers       Priority: High          Summary:  This provider met with patient at the patient's home. Provider listened with empathy as patient explained her feelings regarding the interactions she recently had with a medical provider. Patient reported that she is in need of financial documentation for home rental purposes. This provider assisted patient with calling the back to activate her internet banking to be able to get the electronic documents needed.                      NAREN Davidson

## 2024-09-13 ENCOUNTER — HOSPITAL ENCOUNTER (OUTPATIENT)
Dept: RESPIRATORY THERAPY | Facility: HOSPITAL | Age: 54
Discharge: HOME | End: 2024-09-13
Payer: COMMERCIAL

## 2024-09-13 ENCOUNTER — LAB (OUTPATIENT)
Dept: LAB | Facility: LAB | Age: 54
End: 2024-09-13
Payer: COMMERCIAL

## 2024-09-13 DIAGNOSIS — I1A.0 RESISTANT HYPERTENSION: ICD-10-CM

## 2024-09-13 LAB
ALBUMIN SERPL BCP-MCNC: 3.9 G/DL (ref 3.4–5)
ANION GAP SERPL CALC-SCNC: 17 MMOL/L (ref 10–20)
BUN SERPL-MCNC: 31 MG/DL (ref 6–23)
CALCIUM SERPL-MCNC: 9.3 MG/DL (ref 8.6–10.6)
CHLORIDE SERPL-SCNC: 99 MMOL/L (ref 98–107)
CO2 SERPL-SCNC: 30 MMOL/L (ref 21–32)
CREAT SERPL-MCNC: 7.55 MG/DL (ref 0.5–1.05)
DHEA-S SERPL-MCNC: 32 UG/DL (ref 30–260)
EGFRCR SERPLBLD CKD-EPI 2021: 6 ML/MIN/1.73M*2
GLUCOSE SERPL-MCNC: 136 MG/DL (ref 74–99)
PHOSPHATE SERPL-MCNC: 5.2 MG/DL (ref 2.5–4.9)
POTASSIUM SERPL-SCNC: 4.7 MMOL/L (ref 3.5–5.3)
SODIUM SERPL-SCNC: 141 MMOL/L (ref 136–145)
T4 FREE SERPL-MCNC: 1.24 NG/DL (ref 0.78–1.48)
TSH SERPL-ACNC: 2.65 MIU/L (ref 0.44–3.98)

## 2024-09-13 PROCEDURE — 84439 ASSAY OF FREE THYROXINE: CPT

## 2024-09-13 PROCEDURE — 82627 DEHYDROEPIANDROSTERONE: CPT

## 2024-09-13 PROCEDURE — 80069 RENAL FUNCTION PANEL: CPT

## 2024-09-13 PROCEDURE — 36415 COLL VENOUS BLD VENIPUNCTURE: CPT

## 2024-09-13 PROCEDURE — 84443 ASSAY THYROID STIM HORMONE: CPT

## 2024-09-13 PROCEDURE — 82088 ASSAY OF ALDOSTERONE: CPT

## 2024-09-13 PROCEDURE — 94060 EVALUATION OF WHEEZING: CPT

## 2024-09-13 PROCEDURE — 84445 ASSAY OF TSI GLOBULIN: CPT

## 2024-09-13 PROCEDURE — 84244 ASSAY OF RENIN: CPT

## 2024-09-15 LAB — ALDOST SERPL-MCNC: 9.8 NG/DL

## 2024-09-16 LAB — RENIN PLAS-CCNC: <0.1 NG/ML/HR

## 2024-09-17 ENCOUNTER — APPOINTMENT (OUTPATIENT)
Dept: RADIOLOGY | Facility: HOSPITAL | Age: 54
End: 2024-09-17
Payer: COMMERCIAL

## 2024-09-17 ENCOUNTER — CLINICAL SUPPORT (OUTPATIENT)
Dept: EMERGENCY MEDICINE | Facility: HOSPITAL | Age: 54
End: 2024-09-17
Payer: COMMERCIAL

## 2024-09-17 ENCOUNTER — HOSPITAL ENCOUNTER (INPATIENT)
Facility: HOSPITAL | Age: 54
LOS: 3 days | Discharge: HOME | End: 2024-09-20
Attending: STUDENT IN AN ORGANIZED HEALTH CARE EDUCATION/TRAINING PROGRAM | Admitting: STUDENT IN AN ORGANIZED HEALTH CARE EDUCATION/TRAINING PROGRAM
Payer: COMMERCIAL

## 2024-09-17 DIAGNOSIS — L73.2 HIDRADENITIS: ICD-10-CM

## 2024-09-17 DIAGNOSIS — D64.9 NORMOCYTIC NORMOCHROMIC ANEMIA: ICD-10-CM

## 2024-09-17 DIAGNOSIS — J81.0 FLASH PULMONARY EDEMA: ICD-10-CM

## 2024-09-17 DIAGNOSIS — H25.13 NUCLEAR SENILE CATARACT OF BOTH EYES: ICD-10-CM

## 2024-09-17 DIAGNOSIS — F41.8 ANXIETY ABOUT HEALTH: ICD-10-CM

## 2024-09-17 DIAGNOSIS — I50.1 ACUTE PULMONARY EDEMA WITH HEART DISEASE: ICD-10-CM

## 2024-09-17 DIAGNOSIS — E11.42 POLYNEUROPATHY DUE TO TYPE 2 DIABETES MELLITUS (MULTI): ICD-10-CM

## 2024-09-17 DIAGNOSIS — I50.32 CHRONIC HEART FAILURE WITH PRESERVED EJECTION FRACTION (HFPEF): ICD-10-CM

## 2024-09-17 DIAGNOSIS — I10 POORLY-CONTROLLED HYPERTENSION: ICD-10-CM

## 2024-09-17 DIAGNOSIS — R87.612 LOW GRADE SQUAMOUS INTRAEPITHELIAL LESION ON CYTOLOGIC SMEAR OF CERVIX (LGSIL): ICD-10-CM

## 2024-09-17 DIAGNOSIS — R07.9 CHEST PAIN, UNSPECIFIED TYPE: ICD-10-CM

## 2024-09-17 DIAGNOSIS — E78.5 HYPERLIPIDEMIA, UNSPECIFIED HYPERLIPIDEMIA TYPE: ICD-10-CM

## 2024-09-17 DIAGNOSIS — I16.0 HYPERTENSIVE URGENCY: ICD-10-CM

## 2024-09-17 DIAGNOSIS — K52.9 INFLAMMATION OF STOMACH AND INTESTINE: ICD-10-CM

## 2024-09-17 DIAGNOSIS — E78.5 COMPLEX DYSLIPIDEMIA: ICD-10-CM

## 2024-09-17 DIAGNOSIS — I50.9 CONGESTIVE HEART FAILURE, UNSPECIFIED HF CHRONICITY, UNSPECIFIED HEART FAILURE TYPE: ICD-10-CM

## 2024-09-17 DIAGNOSIS — J45.40 MODERATE PERSISTENT ASTHMA, UNSPECIFIED WHETHER COMPLICATED (HHS-HCC): ICD-10-CM

## 2024-09-17 DIAGNOSIS — Z99.2 ESRD (END STAGE RENAL DISEASE) ON DIALYSIS (MULTI): ICD-10-CM

## 2024-09-17 DIAGNOSIS — Z99.2 DIALYSIS PATIENT (CMS-HCC): ICD-10-CM

## 2024-09-17 DIAGNOSIS — Z76.82 KIDNEY TRANSPLANT CANDIDATE: ICD-10-CM

## 2024-09-17 DIAGNOSIS — I50.9 ACUTE ON CHRONIC CONGESTIVE HEART FAILURE, UNSPECIFIED HEART FAILURE TYPE: ICD-10-CM

## 2024-09-17 DIAGNOSIS — L74.9 SWEATING ABNORMALITY: ICD-10-CM

## 2024-09-17 DIAGNOSIS — K59.00 CONSTIPATION, UNSPECIFIED CONSTIPATION TYPE: ICD-10-CM

## 2024-09-17 DIAGNOSIS — E66.9 OBESITY, CLASS I, BMI 30-34.9: ICD-10-CM

## 2024-09-17 DIAGNOSIS — E44.0 MALNUTRITION OF MODERATE DEGREE (MULTI): ICD-10-CM

## 2024-09-17 DIAGNOSIS — I10 ESSENTIAL HYPERTENSION: ICD-10-CM

## 2024-09-17 DIAGNOSIS — L02.419 ABSCESS OF AXILLARY REGION: ICD-10-CM

## 2024-09-17 DIAGNOSIS — I50.31 ACUTE DIASTOLIC CHF (CONGESTIVE HEART FAILURE): ICD-10-CM

## 2024-09-17 DIAGNOSIS — I16.1 HYPERTENSIVE EMERGENCY: ICD-10-CM

## 2024-09-17 DIAGNOSIS — I82.622 ACUTE DEEP VEIN THROMBOSIS (DVT) OF LEFT UPPER EXTREMITY (MULTI): ICD-10-CM

## 2024-09-17 DIAGNOSIS — F41.9 ANXIETY: ICD-10-CM

## 2024-09-17 DIAGNOSIS — R87.612 LOW GRADE SQUAMOUS INTRAEPITH LESION ON CYTOLOGIC SMEAR CERVIX (LGSIL): ICD-10-CM

## 2024-09-17 DIAGNOSIS — I25.10 CORONARY ARTERY DISEASE INVOLVING NATIVE CORONARY ARTERY OF NATIVE HEART WITHOUT ANGINA PECTORIS: ICD-10-CM

## 2024-09-17 DIAGNOSIS — R06.09 DYSPNEA ON EXERTION: ICD-10-CM

## 2024-09-17 DIAGNOSIS — G62.9 NEUROPATHY: ICD-10-CM

## 2024-09-17 DIAGNOSIS — N28.9 NEPHROPATHY: ICD-10-CM

## 2024-09-17 DIAGNOSIS — N18.6 ESRD (END STAGE RENAL DISEASE) ON DIALYSIS (MULTI): ICD-10-CM

## 2024-09-17 DIAGNOSIS — R06.02 SHORTNESS OF BREATH: Primary | ICD-10-CM

## 2024-09-17 DIAGNOSIS — I1A.0 RESISTANT HYPERTENSION: ICD-10-CM

## 2024-09-17 DIAGNOSIS — Z94.9 TRANSPLANT: ICD-10-CM

## 2024-09-17 DIAGNOSIS — I31.39 OTHER PERICARDIAL EFFUSION (NONINFLAMMATORY) (HHS-HCC): ICD-10-CM

## 2024-09-17 DIAGNOSIS — F17.200 NICOTINE USE DISORDER: ICD-10-CM

## 2024-09-17 LAB
ALBUMIN SERPL BCP-MCNC: 4.3 G/DL (ref 3.4–5)
ALP SERPL-CCNC: 133 U/L (ref 33–110)
ALT SERPL W P-5'-P-CCNC: 8 U/L (ref 7–45)
ANION GAP SERPL CALC-SCNC: 22 MMOL/L (ref 10–20)
APTT PPP: 37 SECONDS (ref 27–38)
AST SERPL W P-5'-P-CCNC: 11 U/L (ref 9–39)
BASOPHILS # BLD AUTO: 0.08 X10*3/UL (ref 0–0.1)
BASOPHILS NFR BLD AUTO: 1.7 %
BILIRUB SERPL-MCNC: 0.5 MG/DL (ref 0–1.2)
BNP SERPL-MCNC: 2561 PG/ML (ref 0–99)
BUN SERPL-MCNC: 60 MG/DL (ref 6–23)
CALCIUM SERPL-MCNC: 9.5 MG/DL (ref 8.6–10.6)
CARDIAC TROPONIN I PNL SERPL HS: 39 NG/L (ref 0–34)
CARDIAC TROPONIN I PNL SERPL HS: 44 NG/L (ref 0–34)
CHLORIDE SERPL-SCNC: 100 MMOL/L (ref 98–107)
CO2 SERPL-SCNC: 24 MMOL/L (ref 21–32)
CREAT SERPL-MCNC: 11.98 MG/DL (ref 0.5–1.05)
EGFRCR SERPLBLD CKD-EPI 2021: 3 ML/MIN/1.73M*2
EOSINOPHIL # BLD AUTO: 0.51 X10*3/UL (ref 0–0.7)
EOSINOPHIL NFR BLD AUTO: 10.9 %
ERYTHROCYTE [DISTWIDTH] IN BLOOD BY AUTOMATED COUNT: 17.2 % (ref 11.5–14.5)
GLUCOSE SERPL-MCNC: 119 MG/DL (ref 74–99)
HCT VFR BLD AUTO: 37 % (ref 36–46)
HGB BLD-MCNC: 10.9 G/DL (ref 12–16)
IMM GRANULOCYTES # BLD AUTO: 0.03 X10*3/UL (ref 0–0.7)
IMM GRANULOCYTES NFR BLD AUTO: 0.6 % (ref 0–0.9)
INR PPP: 1.3 (ref 0.9–1.1)
LYMPHOCYTES # BLD AUTO: 0.77 X10*3/UL (ref 1.2–4.8)
LYMPHOCYTES NFR BLD AUTO: 16.4 %
MAGNESIUM SERPL-MCNC: 2.56 MG/DL (ref 1.6–2.4)
MCH RBC QN AUTO: 28.8 PG (ref 26–34)
MCHC RBC AUTO-ENTMCNC: 29.5 G/DL (ref 32–36)
MCV RBC AUTO: 98 FL (ref 80–100)
MONOCYTES # BLD AUTO: 0.38 X10*3/UL (ref 0.1–1)
MONOCYTES NFR BLD AUTO: 8.1 %
NEUTROPHILS # BLD AUTO: 2.92 X10*3/UL (ref 1.2–7.7)
NEUTROPHILS NFR BLD AUTO: 62.3 %
NRBC BLD-RTO: 0 /100 WBCS (ref 0–0)
PLATELET # BLD AUTO: 240 X10*3/UL (ref 150–450)
POTASSIUM SERPL-SCNC: 5.1 MMOL/L (ref 3.5–5.3)
PROT SERPL-MCNC: 7.5 G/DL (ref 6.4–8.2)
PROTHROMBIN TIME: 14.1 SECONDS (ref 9.8–12.8)
RBC # BLD AUTO: 3.78 X10*6/UL (ref 4–5.2)
SODIUM SERPL-SCNC: 141 MMOL/L (ref 136–145)
WBC # BLD AUTO: 4.7 X10*3/UL (ref 4.4–11.3)

## 2024-09-17 PROCEDURE — 85610 PROTHROMBIN TIME: CPT | Performed by: EMERGENCY MEDICINE

## 2024-09-17 PROCEDURE — 99222 1ST HOSP IP/OBS MODERATE 55: CPT

## 2024-09-17 PROCEDURE — 2500000001 HC RX 250 WO HCPCS SELF ADMINISTERED DRUGS (ALT 637 FOR MEDICARE OP): Performed by: STUDENT IN AN ORGANIZED HEALTH CARE EDUCATION/TRAINING PROGRAM

## 2024-09-17 PROCEDURE — 70450 CT HEAD/BRAIN W/O DYE: CPT | Performed by: RADIOLOGY

## 2024-09-17 PROCEDURE — 83880 ASSAY OF NATRIURETIC PEPTIDE: CPT | Performed by: STUDENT IN AN ORGANIZED HEALTH CARE EDUCATION/TRAINING PROGRAM

## 2024-09-17 PROCEDURE — 71045 X-RAY EXAM CHEST 1 VIEW: CPT

## 2024-09-17 PROCEDURE — 2500000004 HC RX 250 GENERAL PHARMACY W/ HCPCS (ALT 636 FOR OP/ED): Mod: JG | Performed by: STUDENT IN AN ORGANIZED HEALTH CARE EDUCATION/TRAINING PROGRAM

## 2024-09-17 PROCEDURE — 36415 COLL VENOUS BLD VENIPUNCTURE: CPT | Performed by: EMERGENCY MEDICINE

## 2024-09-17 PROCEDURE — 85025 COMPLETE CBC W/AUTO DIFF WBC: CPT | Performed by: STUDENT IN AN ORGANIZED HEALTH CARE EDUCATION/TRAINING PROGRAM

## 2024-09-17 PROCEDURE — 70450 CT HEAD/BRAIN W/O DYE: CPT

## 2024-09-17 PROCEDURE — 83880 ASSAY OF NATRIURETIC PEPTIDE: CPT | Performed by: EMERGENCY MEDICINE

## 2024-09-17 PROCEDURE — 85730 THROMBOPLASTIN TIME PARTIAL: CPT | Performed by: EMERGENCY MEDICINE

## 2024-09-17 PROCEDURE — 85025 COMPLETE CBC W/AUTO DIFF WBC: CPT | Performed by: EMERGENCY MEDICINE

## 2024-09-17 PROCEDURE — 85610 PROTHROMBIN TIME: CPT | Performed by: STUDENT IN AN ORGANIZED HEALTH CARE EDUCATION/TRAINING PROGRAM

## 2024-09-17 PROCEDURE — 80053 COMPREHEN METABOLIC PANEL: CPT | Performed by: STUDENT IN AN ORGANIZED HEALTH CARE EDUCATION/TRAINING PROGRAM

## 2024-09-17 PROCEDURE — 2500000001 HC RX 250 WO HCPCS SELF ADMINISTERED DRUGS (ALT 637 FOR MEDICARE OP)

## 2024-09-17 PROCEDURE — 1200000002 HC GENERAL ROOM WITH TELEMETRY DAILY

## 2024-09-17 PROCEDURE — 85730 THROMBOPLASTIN TIME PARTIAL: CPT | Performed by: STUDENT IN AN ORGANIZED HEALTH CARE EDUCATION/TRAINING PROGRAM

## 2024-09-17 PROCEDURE — 84484 ASSAY OF TROPONIN QUANT: CPT | Performed by: STUDENT IN AN ORGANIZED HEALTH CARE EDUCATION/TRAINING PROGRAM

## 2024-09-17 PROCEDURE — 36415 COLL VENOUS BLD VENIPUNCTURE: CPT

## 2024-09-17 PROCEDURE — 71045 X-RAY EXAM CHEST 1 VIEW: CPT | Performed by: RADIOLOGY

## 2024-09-17 PROCEDURE — 84484 ASSAY OF TROPONIN QUANT: CPT | Performed by: EMERGENCY MEDICINE

## 2024-09-17 PROCEDURE — 84484 ASSAY OF TROPONIN QUANT: CPT

## 2024-09-17 PROCEDURE — 83735 ASSAY OF MAGNESIUM: CPT | Performed by: STUDENT IN AN ORGANIZED HEALTH CARE EDUCATION/TRAINING PROGRAM

## 2024-09-17 PROCEDURE — 99285 EMERGENCY DEPT VISIT HI MDM: CPT

## 2024-09-17 PROCEDURE — 96374 THER/PROPH/DIAG INJ IV PUSH: CPT

## 2024-09-17 PROCEDURE — 93010 ELECTROCARDIOGRAM REPORT: CPT | Performed by: PHYSICIAN ASSISTANT

## 2024-09-17 PROCEDURE — 83735 ASSAY OF MAGNESIUM: CPT | Performed by: EMERGENCY MEDICINE

## 2024-09-17 PROCEDURE — 93005 ELECTROCARDIOGRAM TRACING: CPT

## 2024-09-17 PROCEDURE — 99285 EMERGENCY DEPT VISIT HI MDM: CPT | Performed by: STUDENT IN AN ORGANIZED HEALTH CARE EDUCATION/TRAINING PROGRAM

## 2024-09-17 PROCEDURE — 2500000004 HC RX 250 GENERAL PHARMACY W/ HCPCS (ALT 636 FOR OP/ED)

## 2024-09-17 PROCEDURE — 80053 COMPREHEN METABOLIC PANEL: CPT | Performed by: EMERGENCY MEDICINE

## 2024-09-17 PROCEDURE — 96375 TX/PRO/DX INJ NEW DRUG ADDON: CPT

## 2024-09-17 RX ORDER — HYDRALAZINE HYDROCHLORIDE 50 MG/1
100 TABLET, FILM COATED ORAL 3 TIMES DAILY
Status: DISCONTINUED | OUTPATIENT
Start: 2024-09-17 | End: 2024-09-20

## 2024-09-17 RX ORDER — METOCLOPRAMIDE HYDROCHLORIDE 5 MG/ML
10 INJECTION INTRAMUSCULAR; INTRAVENOUS ONCE
Status: COMPLETED | OUTPATIENT
Start: 2024-09-17 | End: 2024-09-17

## 2024-09-17 RX ORDER — LABETALOL HYDROCHLORIDE 5 MG/ML
10 INJECTION, SOLUTION INTRAVENOUS ONCE
Status: COMPLETED | OUTPATIENT
Start: 2024-09-17 | End: 2024-09-17

## 2024-09-17 RX ORDER — ISOSORBIDE MONONITRATE 30 MG/1
120 TABLET, EXTENDED RELEASE ORAL ONCE
Status: COMPLETED | OUTPATIENT
Start: 2024-09-17 | End: 2024-09-17

## 2024-09-17 RX ORDER — HYDRALAZINE HYDROCHLORIDE 20 MG/ML
10 INJECTION INTRAMUSCULAR; INTRAVENOUS ONCE
Status: COMPLETED | OUTPATIENT
Start: 2024-09-17 | End: 2024-09-17

## 2024-09-17 RX ORDER — HYDRALAZINE HYDROCHLORIDE 20 MG/ML
10 INJECTION INTRAMUSCULAR; INTRAVENOUS EVERY 4 HOURS PRN
Status: DISCONTINUED | OUTPATIENT
Start: 2024-09-17 | End: 2024-09-18

## 2024-09-17 RX ORDER — CARVEDILOL 12.5 MG/1
50 TABLET ORAL ONCE
Status: COMPLETED | OUTPATIENT
Start: 2024-09-17 | End: 2024-09-17

## 2024-09-17 RX ORDER — ACETAMINOPHEN 325 MG/1
650 TABLET ORAL ONCE
Status: COMPLETED | OUTPATIENT
Start: 2024-09-17 | End: 2024-09-17

## 2024-09-17 RX ORDER — HYDRALAZINE HYDROCHLORIDE 20 MG/ML
INJECTION INTRAMUSCULAR; INTRAVENOUS
Status: DISPENSED
Start: 2024-09-17 | End: 2024-09-18

## 2024-09-17 RX ORDER — NAPROXEN SODIUM 220 MG/1
324 TABLET, FILM COATED ORAL ONCE
Status: COMPLETED | OUTPATIENT
Start: 2024-09-17 | End: 2024-09-17

## 2024-09-17 RX ORDER — CLONIDINE HYDROCHLORIDE 0.1 MG/1
0.2 TABLET ORAL EVERY 8 HOURS SCHEDULED
Status: DISCONTINUED | OUTPATIENT
Start: 2024-09-17 | End: 2024-09-20

## 2024-09-17 RX ORDER — TORSEMIDE 20 MG/1
40 TABLET ORAL ONCE
Status: COMPLETED | OUTPATIENT
Start: 2024-09-17 | End: 2024-09-17

## 2024-09-17 ASSESSMENT — LIFESTYLE VARIABLES
TOTAL SCORE: 0
EVER FELT BAD OR GUILTY ABOUT YOUR DRINKING: NO
HAVE PEOPLE ANNOYED YOU BY CRITICIZING YOUR DRINKING: NO
HAVE YOU EVER FELT YOU SHOULD CUT DOWN ON YOUR DRINKING: NO
EVER HAD A DRINK FIRST THING IN THE MORNING TO STEADY YOUR NERVES TO GET RID OF A HANGOVER: NO

## 2024-09-17 ASSESSMENT — PAIN - FUNCTIONAL ASSESSMENT: PAIN_FUNCTIONAL_ASSESSMENT: 0-10

## 2024-09-17 ASSESSMENT — PAIN DESCRIPTION - LOCATION: LOCATION: HEAD

## 2024-09-17 ASSESSMENT — PAIN DESCRIPTION - PAIN TYPE: TYPE: ACUTE PAIN

## 2024-09-17 ASSESSMENT — PAIN SCALES - GENERAL: PAINLEVEL_OUTOF10: 7

## 2024-09-17 NOTE — ED TRIAGE NOTES
Pt presented to ED for c/o SOB, chest tightness, HA/migraine, CP since this AM. Pt hx of ESRD (dialysis TRS). Pt is HTN in triage 216/87. Pt has many visits with the same complaints.

## 2024-09-17 NOTE — H&P
CHIEF COMPLAINT:     HPI:   Stacey Heath is a 54 y.o. 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 Of note, patient was due for dialysis today, missed session (last dialysis session Saturday). Says headache and chest pain is better now during time of assessment. Patient is unsure if she took all her home anti-HTN medications this am     Of note, patient was admitted for SOB 2/2 poorly controlled HTN and missed dialysis sessions several times before (most recently in ED w emergent dialysis then discharge on 8/29).     ED Interventions:  -Vitals:   Vitals:    09/17/24 1655 09/17/24 1814 09/17/24 1815 09/17/24 1900   BP: (!) 213/94 (!) 214/90 (!) 214/90 (!) 196/86   BP Location:       Patient Position:       Pulse: 70  76 76   Resp: 14  (!) 24 20   Temp:       TempSrc:       SpO2: 97%  97% 97%   Weight:       Height:         - Labs:   CBC: WBC 4.7 Hgb 10.9  plt 240   BMP: Na 141, K 5.1 Cl 100 HCO3 24 BUN 60 Cr 11.98glu 119   LFT: Ca 9.5 tprot 7.5, alb 4.3 alkphos 133 AST11 ALT 8 tbili 0.5 dbili _   Electrolytes: PO4 _ Mg 2.56   Heme: PT 14.1, INR 1.3 aPTT 37  BNP 2,561 Troponin 39  - Imaging:  === 09/17/24 ===  CT HEAD w/o IV contrast  No acute intracranial hemorrhage, mass effect, or CT apparent acute  infarct. Mild chronic small vessel ischemic disease.    === 09/17/24 ===  XR CHEST 1 VIEW  1. Small left pleural effusion.  2. Redemonstration of atelectatic changes of the lingula.    ECG has no st segment elevations.     Course Summary:  - Given 325 ASA for complaint of chest pain and 625mg tylenol   - 10mcg IM hydralazine ONCE and 10mg IV labetalol ONCE. Started 100mg TID PO hydralazine  - 0.2mg clonidine  - Called nephrology, dialysis orders in place - likely in the morning given no acute/emergent need now   -  Carvedilol 50mg, isosorbid mononitrate 120mg and torsemide 40mg ordered for BP still in 210s     PAST MEDICAL HISTORY:   Past Medical History:   Diagnosis Date    Bacteremia due to methicillin resistant Staphylococcus aureus 07/08/2024    Cardiac/pericardial tamponade ([facility]) 01/20/2024    CHF (congestive heart failure) (Multi)     COPD (chronic obstructive pulmonary disease) (Multi)     Coronary artery disease     Disorder of sweat glands 02/25/2023    Dry eye syndrome of bilateral lacrimal glands 03/07/2017    Dry eyes    ESRD (end stage renal disease) (Multi)     Essential (primary) hypertension 12/27/2022    Hypertension    History of acute pancreatitis 12/21/2020    History of acute pancreatitis    Low grade squamous intraepithelial lesion (LGSIL) on cervicovaginal cytologic smear 02/25/2023    Migraines     History of migraine    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    Type 2 myocardial infarction (Multi) 01/20/2024        PAST SURGICAL HISTORY:   Past Surgical History:   Procedure Laterality Date    APPENDECTOMY  03/07/2017    Appendectomy    CT ABDOMEN ANGIOGRAM W AND/OR WO IV CONTRAST  4/23/2023    CT ABDOMEN ANGIOGRAM W AND/OR WO IV CONTRAST [facility] CT    OTHER SURGICAL HISTORY  03/07/2017    Cystoscopy With Pyeloscopy With Removal Of Calculus    OTHER SURGICAL HISTORY  03/07/2017    Anoscopy For Polyp Removal    OTHER SURGICAL HISTORY  12/08/2021    Arteriovenous fistula creation procedure    OTHER SURGICAL HISTORY  12/08/2021    Dialysis tunneled catheter placement    TUBAL LIGATION  03/07/2017    Tubal Ligation       Medications  Home Meds  Prior to Admission medications    Medication Sig Start Date End Date Taking? Authorizing Provider   albuterol 1.25 mg/3 mL nebulizer solution Take 3 mL (1.25 mg) by nebulization every 6 hours if needed for wheezing. 10/26/23 10/25/24  Carroll Medina,    albuterol sulfate (Proair Digihaler) 90 mcg/actuation aero powdr breath act w/sensor inhaler Inhale  2 puffs 2 times a day as needed for wheezing or shortness of breath.    Historical Provider, MD   apixaban (Eliquis) 5 mg tablet Take 1 tablet (5 mg) by mouth 2 times a day. 8/12/24 9/11/24  Chinedu Calderón MD   aspirin 81 mg EC tablet Take 1 tablet (81 mg) by mouth once daily.    Historical Provider, MD   atorvastatin (Lipitor) 80 mg tablet Take 1 tablet (80 mg) by mouth once daily at bedtime. 8/5/24 7/31/25  Mago Stovall MD   B complex-vitamin C-folic acid (Nephrocaps) 1 mg capsule Take 1 capsule by mouth once daily. 8/12/24 9/11/24  Chinedu Calderón MD   calcium acetate (Phoslo) 667 mg capsule Take 2 capsules (1,334 mg) by mouth 3 times daily (morning, midday, late afternoon). 5/31/24 8/23/24  Hai Yi MD   carvedilol (Coreg) 25 mg tablet Take 2 tablets (50 mg) by mouth 2 times a day. 8/24/24 10/23/24  Lyndon Tubbs MD   cloNIDine (Catapres) 0.2 mg tablet Take 1 tablet (0.2 mg) by mouth 3 times a day. 8/24/24 10/23/24  Lyndon Tubbs MD   diphenhydrAMINE (BENADryl) 25 mg capsule Take 1 capsule (25 mg) by mouth as needed at bedtime for itching or sleep.    Historical Provider, MD   epoetin joceline (Epogen,Procrit) 10,000 unit/mL injection Inject 1 mL (10,000 Units) under the skin 3 times a week. 1/22/24   YONIS Oliva   fluticasone (Flonase) 50 mcg/actuation nasal spray Administer 2 sprays into each nostril once daily. Shake gently. Before first use, prime pump. After use, clean tip and replace cap. 1/20/24   YONIS Oliva   fluticasone furoate-vilanteroL (Breo Ellipta) 100-25 mcg/dose inhaler Inhale 1 puff once daily. 8/5/24 9/11/24  Mago Stovall MD   gabapentin (Neurontin) 300 mg capsule Take 1 capsule (300 mg) by mouth once daily at bedtime. 8/9/24   Rosanne Garcia MD   hydrALAZINE (Apresoline) 100 mg tablet Take 1 tablet (100 mg) by mouth 3 times a day. 8/24/24 10/23/24  Lyndon Tubbs MD   hydrOXYzine HCL (Atarax) 25 mg tablet Take 1 tablet (25 mg) by mouth  every 6 hours if needed for anxiety. 8/25/24   Lyndon Tubbs MD   isosorbide mononitrate ER (Imdur) 120 mg 24 hr tablet Take 1 tablet (120 mg) by mouth once daily. Do not crush or chew. 8/24/24 10/23/24  Lyndon Tubbs MD   NIFEdipine ER (Adalat CC) 90 mg 24 hr tablet Take 1 tablet (90 mg) by mouth once daily in the morning. Take before meals. Do not crush, chew, or split. 8/24/24 10/23/24  Lyndon Tubbs MD   polyethylene glycol (Glycolax, Miralax) 17 gram/dose powder Take 17 g (1 scoop dissolved in liquid) by mouth once daily. Do not start before July 26, 2024. 7/26/24   Raquel Carlson MD   sodium chloride (Ocean) 0.65 % nasal spray Administer 1 spray into each nostril 4 times a day as needed for congestion. 7/11/24   Soniya Ruff MD   torsemide (Demadex) 20 mg tablet Take 2 tablets once daily on non-dialysis days only. Do not take on dialysis days 8/5/24   Mago Stovall MD   valsartan (Diovan) 320 mg tablet Take 1 tablet (320 mg) by mouth once daily. 8/24/24 10/23/24  Lyndon Tubbs MD     ALL:   Allergies   Allergen Reactions    Iodine Hives, Itching and Unknown    Bioflavonoids Swelling    Codeine Itching, Hives and Unknown     Tolerates percocet   Tolerates percocet    Tolerates percocet    Flowers Itching    Hydromorphone Itching    Shellfish Containing Products Swelling     SEAFOOD        FAMILY HISTORY:   Family History   Problem Relation Name Age of Onset    Other (Cerebrovascular Accident) Father      Heart failure Paternal Grandmother      Breast cancer Paternal Grandmother      Other (Primary Cervical Cancer) Paternal Grandmother      Diabetes Paternal Grandfather      Breast cancer Father's Sister      Ovarian cancer Father's Sister       TTE 4/3/2024  CONCLUSIONS:   1. Left ventricular systolic function is normal.   2. There is severe concentric left ventricular hypertrophy.   3. There is moderate mitral annular calcification.   4. Mild to moderate mitral valve  regurgitation.   5. Limited study, but no gross changes seen.    PHYSICAL EXAM:   General: awake, alert, conversant, appears stated age  HEENT: pupils equal and round, no scleral icterus or conjunctivitis  Skin: no suspect lesions or rashes noted on visible skin  Chest: ctab, normal respiratory effort, not on supplemental oxygen  Cardiac: regular rate and rhythm, normal s1, s2, systolic murmur appreciated  Abdomen: soft, ND, NT, no involuntary guarding  : no flank pain or indwelling urinary catheter  EXT: no peripheral edema, no asymmetry noted  MSK: no focal joint swelling noted  Neuro: AOx4, moving all limbs spontaneously, follows commands  Psych: coherent thought process, appropriate mood and affect    Assessment/Plan   Stacey Heath is a 54 y.o. 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  Patient received multiple blood pressure medications via IV as well as p.o. and blood pressure is now 190s.  Nephrology notified to schedule dialysis, orders in place, likely will receive in early am. Will monitor BP closely  Following dialysis will slowly restart home blood pressure medications.     #hypertensive emergency  #Chest pain  :: home BP regimen: carvedilol 50 BID, valsartan 320, nifedipine 90, isosorbide mononitrate 120, hydral 100 TID, clonidine 0.2mg TID   -Carvedilol 50mg, isosorbid mononitrate 120mg and torsemide 40mg ordered for BP still in 210s (went to 190s, and increased to 210s). Will restart above medications after HD so as not to drop BP excessively within 24 hours   - ECG no signs of ST segment elevations, troponin's unreliable I/s of ESRD w missed dialysis. Systolic murmur  likely 2/2 severe LV concentric hypertrophy and severe HTN. BNP elevated (however likely 2/2 overload I/s missed dialysis and HTN). Nitrate and ASA given in ED. Low suspicion for Cardiac cause.      #ESRD on HD  - Nephrology consulted for HD, orders in place, HD likely in early am.      #hx of brachial DVT  - continue home Eliquis 5 BID      F: prn   E: prn   N: renal diet  GI: none  DVT: home apixaban      CODE STATUS: full code (confirmed on admission)   NOK: Diya Jang (daughter) [phone]       Jairon Liu MD  PGY2-Internal Medicine

## 2024-09-17 NOTE — ED PROVIDER NOTES
Emergency Department Provider Note        History of Present Illness     History provided by: Patient and medical chart review  Limitations to History: None  External Records Reviewed with Brief Summary:  Medical Chart review.    HPI:  Stacey Heath is a 54 y.o. female ESRD with  dialysis states, hypertension, diabetes, heart failure preserved EF, diabetes, COPD, DVT on Eliquis who presents with hypertension, squeezing chest pain, squeezing head pain and increased shortness of breath.  She states she did take all of her antihypertensive medications earlier today.  States her last full dialysis was Saturday and that she was due for dialysis 3 hours prior to interview.  She also reports 3 episodes of watery stool earlier today.  She denies blood in stool, states she has a mild dry cough, denies fever, denies pedal edema, additionally states she takes her Eliquis faithfully.  She denies use of home oxygen.  On arrival to the emergency department she is saturating 95% on room air.    Physical Exam   Triage vitals:  T 36.5 °C (97.7 °F)  HR 76  BP (!) 216/87  RR 19  O2 95 %      General: Awake, alert, in no acute distress  Eyes: Gaze conjugate.  No scleral icterus or injection  HENT: Normo-cephalic, atraumatic. No stridor  CV: Regular rate, regular rhythm. Radial pulses 2+ bilaterally  Resp: Breathing non-labored, speaking in full sentences.  Clear to auscultation bilaterally  GI: Soft, non-distended, non-tender. No rebound or guarding.  : Deferred  MSK/Extremities: No gross bony deformities. Moving all extremities  Skin: Warm. Appropriate color  Neuro: Alert. Oriented. Face symmetric. Speech is fluent.  Gross strength and sensation intact in b/l UE and LEs  Psych: Appropriate mood and affect    Medical Decision Making & ED Course   Medical Decision Makin y.o. female who arrives to the emergency department the chief complaint of hypertension, shortness of breath, chest pain, and  squeezing headache.  Patient was scheduled to receive dialysis at 1300 today however it is now 1621 the patient has missed dialysis for today.  Twelve-lead EKG, chest x-ray, head CT, CMP, troponin with delta, BNP, PT/INR, magnesium, CBC ordered for evaluation.  10 mg of hydralazine was given to the patient for treatment of systolic blood pressure of 234.  Do not want to decrease blood pressure by more than 20% in this patient, we will aim for a systolic blood pressure over 190.  Also contacting dialysis department to see if the patient can be dialyzed today.  Please see ED course for further medical decision making.  ----      Differential diagnoses considered include but are not limited to: ACS, hypertensive emergency, intracranial hemorrhage, exacerbation of congestive heart failure     Social Determinants of Health which Significantly Impact Care: None identified     EKG Independent Interpretation: EKG interpreted by myself. Please see ED Course for full interpretation.    Independent Result Review and Interpretation: Relevant laboratory and radiographic results were reviewed and independently interpreted by myself.  As necessary, they are commented on in the ED Course.    Chronic conditions affecting the patient's care: As documented above in MDM    The patient was discussed with the following consultants/services:  Dialysis service, admission coordinators    Care Considerations: As documented above in [facility]    ED Course:  ED Course as of 09/17/24 1615   Tue Sep 17, 2024   1611 XR chest 1 view  IMPRESSION:  1. Small left pleural effusion.  2. Redemonstration of atelectatic changes of the lingula.   [PV]   1611 B-type natriuretic peptide(!) [PV]   1611 BNP(!): 2,561  BMP was 1430 2 weeks ago [PV]   1612 Creatinine(!): 11.98  Creatinine was 7.44 four days ago [PV]   1612 324 mg aspirin ordered due to the patient's complaint of squeezing chest pain. [PV]      ED Course User Index  [PV] Indio Massey DO          Diagnoses as of 09/17/24 1615   Shortness of breath   Chest pain, unspecified type   Congestive heart failure, unspecified HF chronicity, unspecified heart failure type (Multi)   Hypertensive urgency     Disposition   As a result of their workup, the patient will require admission to the hospital.  The patient was informed of her diagnosis.  The patient was given the opportunity to ask questions and I answered them. The patient agreed to be admitted to the hospital.    Procedures   Procedures    Patient seen and discussed with ED attending physician.    Indio Massey DO  Emergency Medicine     Indio Massey DO  Resident  09/17/24 1944

## 2024-09-18 ENCOUNTER — APPOINTMENT (OUTPATIENT)
Dept: CARE COORDINATION | Facility: CLINIC | Age: 54
End: 2024-09-18
Payer: COMMERCIAL

## 2024-09-18 ENCOUNTER — APPOINTMENT (OUTPATIENT)
Dept: DIALYSIS | Facility: HOSPITAL | Age: 54
End: 2024-09-18
Payer: COMMERCIAL

## 2024-09-18 ENCOUNTER — DOCUMENTATION (OUTPATIENT)
Dept: BEHAVIORAL HEALTH | Facility: CLINIC | Age: 54
End: 2024-09-18

## 2024-09-18 LAB
ABO GROUP (TYPE) IN BLOOD: NORMAL
ALBUMIN SERPL BCP-MCNC: 3.8 G/DL (ref 3.4–5)
ANION GAP BLDV CALCULATED.4IONS-SCNC: 17 MMOL/L (ref 10–25)
ANION GAP SERPL CALC-SCNC: 18 MMOL/L (ref 10–20)
ANTIBODY SCREEN: NORMAL
ATRIAL RATE: 77 BPM
BASE EXCESS BLDV CALC-SCNC: -0.8 MMOL/L (ref -2–3)
BODY TEMPERATURE: 37 DEGREES CELSIUS
BUN SERPL-MCNC: 25 MG/DL (ref 6–23)
CA-I BLDV-SCNC: 1.18 MMOL/L (ref 1.1–1.33)
CALCIUM SERPL-MCNC: 8.9 MG/DL (ref 8.6–10.6)
CHLORIDE BLDV-SCNC: 102 MMOL/L (ref 98–107)
CHLORIDE SERPL-SCNC: 95 MMOL/L (ref 98–107)
CO2 SERPL-SCNC: 26 MMOL/L (ref 21–32)
CREAT SERPL-MCNC: 6.57 MG/DL (ref 0.5–1.05)
EGFRCR SERPLBLD CKD-EPI 2021: 7 ML/MIN/1.73M*2
GLUCOSE BLDV-MCNC: 140 MG/DL (ref 74–99)
GLUCOSE SERPL-MCNC: 145 MG/DL (ref 74–99)
HCO3 BLDV-SCNC: 24.3 MMOL/L (ref 22–26)
HCT VFR BLD EST: 30 % (ref 36–46)
HGB BLDV-MCNC: 10.1 G/DL (ref 12–16)
INHALED O2 CONCENTRATION: 21 %
LACTATE BLDV-SCNC: 1.2 MMOL/L (ref 0.4–2)
MAGNESIUM SERPL-MCNC: 2.11 MG/DL (ref 1.6–2.4)
OXYHGB MFR BLDV: 69.2 % (ref 45–75)
P AXIS: 42 DEGREES
P OFFSET: 191 MS
P ONSET: 144 MS
PCO2 BLDV: 41 MM HG (ref 41–51)
PH BLDV: 7.38 PH (ref 7.33–7.43)
PHOSPHATE SERPL-MCNC: 4 MG/DL (ref 2.5–4.9)
PO2 BLDV: 46 MM HG (ref 35–45)
POTASSIUM BLDV-SCNC: 5.1 MMOL/L (ref 3.5–5.3)
POTASSIUM SERPL-SCNC: 4.2 MMOL/L (ref 3.5–5.3)
PR INTERVAL: 150 MS
Q ONSET: 219 MS
QRS COUNT: 13 BEATS
QRS DURATION: 76 MS
QT INTERVAL: 404 MS
QTC CALCULATION(BAZETT): 457 MS
QTC FREDERICIA: 439 MS
R AXIS: 22 DEGREES
RH FACTOR (ANTIGEN D): NORMAL
SAO2 % BLDV: 70 % (ref 45–75)
SODIUM BLDV-SCNC: 138 MMOL/L (ref 136–145)
SODIUM SERPL-SCNC: 135 MMOL/L (ref 136–145)
T AXIS: 180 DEGREES
T OFFSET: 421 MS
VENTRICULAR RATE: 77 BPM

## 2024-09-18 PROCEDURE — 2500000001 HC RX 250 WO HCPCS SELF ADMINISTERED DRUGS (ALT 637 FOR MEDICARE OP)

## 2024-09-18 PROCEDURE — 2500000002 HC RX 250 W HCPCS SELF ADMINISTERED DRUGS (ALT 637 FOR MEDICARE OP, ALT 636 FOR OP/ED)

## 2024-09-18 PROCEDURE — 36415 COLL VENOUS BLD VENIPUNCTURE: CPT

## 2024-09-18 PROCEDURE — 2500000004 HC RX 250 GENERAL PHARMACY W/ HCPCS (ALT 636 FOR OP/ED): Performed by: STUDENT IN AN ORGANIZED HEALTH CARE EDUCATION/TRAINING PROGRAM

## 2024-09-18 PROCEDURE — 99291 CRITICAL CARE FIRST HOUR: CPT

## 2024-09-18 PROCEDURE — 2500000004 HC RX 250 GENERAL PHARMACY W/ HCPCS (ALT 636 FOR OP/ED)

## 2024-09-18 PROCEDURE — 86901 BLOOD TYPING SEROLOGIC RH(D): CPT

## 2024-09-18 PROCEDURE — 84132 ASSAY OF SERUM POTASSIUM: CPT

## 2024-09-18 PROCEDURE — 2020000001 HC ICU ROOM DAILY

## 2024-09-18 PROCEDURE — 83735 ASSAY OF MAGNESIUM: CPT

## 2024-09-18 PROCEDURE — 6350000001 HC RX 635 EPOETIN >10,000 UNITS: Mod: JZ

## 2024-09-18 PROCEDURE — 2500000002 HC RX 250 W HCPCS SELF ADMINISTERED DRUGS (ALT 637 FOR MEDICARE OP, ALT 636 FOR OP/ED): Performed by: STUDENT IN AN ORGANIZED HEALTH CARE EDUCATION/TRAINING PROGRAM

## 2024-09-18 PROCEDURE — 99222 1ST HOSP IP/OBS MODERATE 55: CPT

## 2024-09-18 PROCEDURE — 83835 ASSAY OF METANEPHRINES: CPT

## 2024-09-18 PROCEDURE — 2500000001 HC RX 250 WO HCPCS SELF ADMINISTERED DRUGS (ALT 637 FOR MEDICARE OP): Performed by: STUDENT IN AN ORGANIZED HEALTH CARE EDUCATION/TRAINING PROGRAM

## 2024-09-18 PROCEDURE — 99223 1ST HOSP IP/OBS HIGH 75: CPT

## 2024-09-18 RX ORDER — AMLODIPINE BESYLATE 10 MG/1
10 TABLET ORAL DAILY
Status: ON HOLD | COMMUNITY
End: 2024-09-20 | Stop reason: ENTERED-IN-ERROR

## 2024-09-18 RX ORDER — HYDROXYZINE HYDROCHLORIDE 25 MG/1
25 TABLET, FILM COATED ORAL EVERY 6 HOURS PRN
Status: DISCONTINUED | OUTPATIENT
Start: 2024-09-18 | End: 2024-09-20 | Stop reason: HOSPADM

## 2024-09-18 RX ORDER — ATORVASTATIN CALCIUM 80 MG/1
80 TABLET, FILM COATED ORAL NIGHTLY
Status: DISCONTINUED | OUTPATIENT
Start: 2024-09-18 | End: 2024-09-20 | Stop reason: HOSPADM

## 2024-09-18 RX ORDER — POLYETHYLENE GLYCOL 3350 17 G/17G
17 POWDER, FOR SOLUTION ORAL DAILY
Status: DISCONTINUED | OUTPATIENT
Start: 2024-09-18 | End: 2024-09-20 | Stop reason: HOSPADM

## 2024-09-18 RX ORDER — PANTOPRAZOLE SODIUM 40 MG/1
40 TABLET, DELAYED RELEASE ORAL
Status: ON HOLD | COMMUNITY
End: 2024-09-20

## 2024-09-18 RX ORDER — LOPERAMIDE HYDROCHLORIDE 2 MG/1
2 CAPSULE ORAL 4 TIMES DAILY PRN
Status: ON HOLD | COMMUNITY
End: 2024-09-20 | Stop reason: ENTERED-IN-ERROR

## 2024-09-18 RX ORDER — PANTOPRAZOLE SODIUM 40 MG/1
40 TABLET, DELAYED RELEASE ORAL
Status: DISCONTINUED | OUTPATIENT
Start: 2024-09-19 | End: 2024-09-20 | Stop reason: HOSPADM

## 2024-09-18 RX ORDER — LABETALOL HYDROCHLORIDE 5 MG/ML
10 INJECTION, SOLUTION INTRAVENOUS ONCE
Status: COMPLETED | OUTPATIENT
Start: 2024-09-18 | End: 2024-09-18

## 2024-09-18 RX ORDER — MELATONIN 5 MG
5 CAPSULE ORAL NIGHTLY
Status: ON HOLD | COMMUNITY
End: 2024-09-20

## 2024-09-18 RX ORDER — LABETALOL HYDROCHLORIDE 5 MG/ML
40 INJECTION, SOLUTION INTRAVENOUS ONCE
Status: COMPLETED | OUTPATIENT
Start: 2024-09-18 | End: 2024-09-18

## 2024-09-18 RX ORDER — NICARDIPINE HYDROCHLORIDE 0.2 MG/ML
2.5-15 INJECTION INTRAVENOUS CONTINUOUS
Status: DISCONTINUED | OUTPATIENT
Start: 2024-09-18 | End: 2024-09-19

## 2024-09-18 RX ORDER — ACETAMINOPHEN 160 MG/5ML
950 SOLUTION ORAL EVERY 6 HOURS PRN
Status: DISCONTINUED | OUTPATIENT
Start: 2024-09-18 | End: 2024-09-20 | Stop reason: HOSPADM

## 2024-09-18 RX ORDER — FLUTICASONE PROPIONATE 50 MCG
2 SPRAY, SUSPENSION (ML) NASAL DAILY
Status: DISCONTINUED | OUTPATIENT
Start: 2024-09-18 | End: 2024-09-20 | Stop reason: HOSPADM

## 2024-09-18 RX ORDER — CARVEDILOL 25 MG/1
50 TABLET ORAL 2 TIMES DAILY
Status: DISCONTINUED | OUTPATIENT
Start: 2024-09-18 | End: 2024-09-19

## 2024-09-18 RX ORDER — ALBUTEROL SULFATE 0.83 MG/ML
1.25 SOLUTION RESPIRATORY (INHALATION) EVERY 6 HOURS PRN
Status: DISCONTINUED | OUTPATIENT
Start: 2024-09-18 | End: 2024-09-18

## 2024-09-18 RX ORDER — FLUTICASONE FUROATE AND VILANTEROL 100; 25 UG/1; UG/1
1 POWDER RESPIRATORY (INHALATION)
Status: DISCONTINUED | OUTPATIENT
Start: 2024-09-18 | End: 2024-09-20 | Stop reason: HOSPADM

## 2024-09-18 RX ORDER — BENZONATATE 100 MG/1
100 CAPSULE ORAL 3 TIMES DAILY PRN
Status: ON HOLD | COMMUNITY
End: 2024-09-20 | Stop reason: ENTERED-IN-ERROR

## 2024-09-18 RX ORDER — SUMATRIPTAN SUCCINATE 25 MG/1
25 TABLET ORAL EVERY 2 HOUR PRN
Status: COMPLETED | OUTPATIENT
Start: 2024-09-18 | End: 2024-09-18

## 2024-09-18 RX ORDER — VALSARTAN 320 MG/1
320 TABLET ORAL DAILY
Status: DISCONTINUED | OUTPATIENT
Start: 2024-09-18 | End: 2024-09-20

## 2024-09-18 RX ORDER — NIFEDIPINE 90 MG/1
90 TABLET, EXTENDED RELEASE ORAL
Status: DISCONTINUED | OUTPATIENT
Start: 2024-09-18 | End: 2024-09-20

## 2024-09-18 RX ORDER — ALBUTEROL SULFATE 0.83 MG/ML
2.5 SOLUTION RESPIRATORY (INHALATION) EVERY 6 HOURS PRN
Status: DISCONTINUED | OUTPATIENT
Start: 2024-09-18 | End: 2024-09-20 | Stop reason: HOSPADM

## 2024-09-18 RX ORDER — GABAPENTIN 300 MG/1
300 CAPSULE ORAL NIGHTLY
Status: DISCONTINUED | OUTPATIENT
Start: 2024-09-18 | End: 2024-09-20 | Stop reason: HOSPADM

## 2024-09-18 RX ORDER — DIPHENHYDRAMINE HCL 25 MG
25 CAPSULE ORAL ONCE
Status: COMPLETED | OUTPATIENT
Start: 2024-09-18 | End: 2024-09-18

## 2024-09-18 RX ORDER — ASPIRIN 81 MG/1
81 TABLET ORAL DAILY
Status: DISCONTINUED | OUTPATIENT
Start: 2024-09-18 | End: 2024-09-20 | Stop reason: HOSPADM

## 2024-09-18 RX ORDER — ACETAMINOPHEN 325 MG/1
950 TABLET ORAL EVERY 6 HOURS PRN
Status: DISCONTINUED | OUTPATIENT
Start: 2024-09-18 | End: 2024-09-20 | Stop reason: HOSPADM

## 2024-09-18 RX ORDER — TALC
3 POWDER (GRAM) TOPICAL NIGHTLY PRN
Status: DISCONTINUED | OUTPATIENT
Start: 2024-09-18 | End: 2024-09-20 | Stop reason: HOSPADM

## 2024-09-18 RX ORDER — TRAMADOL HYDROCHLORIDE 50 MG/1
50 TABLET ORAL ONCE
Status: COMPLETED | OUTPATIENT
Start: 2024-09-18 | End: 2024-09-18

## 2024-09-18 RX ORDER — SUMATRIPTAN SUCCINATE 25 MG/1
25 TABLET ORAL ONCE AS NEEDED
Status: ON HOLD | COMMUNITY
End: 2024-09-20

## 2024-09-18 RX ORDER — CALCIUM ACETATE 667 MG/1
1334 CAPSULE ORAL
Status: DISCONTINUED | OUTPATIENT
Start: 2024-09-18 | End: 2024-09-20 | Stop reason: HOSPADM

## 2024-09-18 RX ORDER — ISOSORBIDE MONONITRATE 30 MG/1
120 TABLET, EXTENDED RELEASE ORAL DAILY
Status: DISCONTINUED | OUTPATIENT
Start: 2024-09-18 | End: 2024-09-20 | Stop reason: HOSPADM

## 2024-09-18 RX ORDER — ONDANSETRON 4 MG/1
4 TABLET, FILM COATED ORAL EVERY 8 HOURS PRN
Status: ON HOLD | COMMUNITY
End: 2024-09-20 | Stop reason: ENTERED-IN-ERROR

## 2024-09-18 RX ORDER — CARVEDILOL 25 MG/1
50 TABLET ORAL 2 TIMES DAILY
Status: DISCONTINUED | OUTPATIENT
Start: 2024-09-19 | End: 2024-09-18

## 2024-09-18 RX ORDER — NICARDIPINE HYDROCHLORIDE 0.2 MG/ML
INJECTION INTRAVENOUS
Status: COMPLETED
Start: 2024-09-18 | End: 2024-09-18

## 2024-09-18 SDOH — SOCIAL STABILITY: SOCIAL INSECURITY: DO YOU FEEL UNSAFE GOING BACK TO THE PLACE WHERE YOU ARE LIVING?: NO

## 2024-09-18 SDOH — SOCIAL STABILITY: SOCIAL INSECURITY: DOES ANYONE TRY TO KEEP YOU FROM HAVING/CONTACTING OTHER FRIENDS OR DOING THINGS OUTSIDE YOUR HOME?: NO

## 2024-09-18 SDOH — SOCIAL STABILITY: SOCIAL INSECURITY: DO YOU FEEL ANYONE HAS EXPLOITED OR TAKEN ADVANTAGE OF YOU FINANCIALLY OR OF YOUR PERSONAL PROPERTY?: NO

## 2024-09-18 SDOH — SOCIAL STABILITY: SOCIAL INSECURITY: HAVE YOU HAD THOUGHTS OF HARMING ANYONE ELSE?: NO

## 2024-09-18 SDOH — SOCIAL STABILITY: SOCIAL INSECURITY: HAS ANYONE EVER THREATENED TO HURT YOUR FAMILY OR YOUR PETS?: NO

## 2024-09-18 SDOH — SOCIAL STABILITY: SOCIAL INSECURITY: ARE THERE ANY APPARENT SIGNS OF INJURIES/BEHAVIORS THAT COULD BE RELATED TO ABUSE/NEGLECT?: NO

## 2024-09-18 SDOH — SOCIAL STABILITY: SOCIAL INSECURITY: HAVE YOU HAD ANY THOUGHTS OF HARMING ANYONE ELSE?: NO

## 2024-09-18 SDOH — SOCIAL STABILITY: SOCIAL INSECURITY: ABUSE: ADULT

## 2024-09-18 SDOH — SOCIAL STABILITY: SOCIAL INSECURITY: ARE YOU OR HAVE YOU BEEN THREATENED OR ABUSED PHYSICALLY, EMOTIONALLY, OR SEXUALLY BY ANYONE?: NO

## 2024-09-18 SDOH — SOCIAL STABILITY: SOCIAL INSECURITY: WERE YOU ABLE TO COMPLETE ALL THE BEHAVIORAL HEALTH SCREENINGS?: YES

## 2024-09-18 ASSESSMENT — COGNITIVE AND FUNCTIONAL STATUS - GENERAL
PATIENT BASELINE BEDBOUND: NO
MOBILITY SCORE: 24
DAILY ACTIVITIY SCORE: 24

## 2024-09-18 ASSESSMENT — ENCOUNTER SYMPTOMS
ABDOMINAL DISTENTION: 0
CHILLS: 0
SINUS PAIN: 0
LIGHT-HEADEDNESS: 0
NAUSEA: 1
BACK PAIN: 0
DIZZINESS: 1
FEVER: 0
APNEA: 0
SHORTNESS OF BREATH: 0
CHEST TIGHTNESS: 0
HEADACHES: 1
PALPITATIONS: 0
ABDOMINAL PAIN: 0
SINUS PRESSURE: 0
DIARRHEA: 1

## 2024-09-18 ASSESSMENT — PAIN DESCRIPTION - DESCRIPTORS
DESCRIPTORS: HEADACHE
DESCRIPTORS: CRAMPING
DESCRIPTORS: CRAMPING
DESCRIPTORS: HEADACHE

## 2024-09-18 ASSESSMENT — PAIN DESCRIPTION - PAIN TYPE: TYPE: ACUTE PAIN

## 2024-09-18 ASSESSMENT — LIFESTYLE VARIABLES
AUDIT-C TOTAL SCORE: 0
HOW MANY STANDARD DRINKS CONTAINING ALCOHOL DO YOU HAVE ON A TYPICAL DAY: PATIENT DOES NOT DRINK
HOW OFTEN DO YOU HAVE A DRINK CONTAINING ALCOHOL: NEVER
SKIP TO QUESTIONS 9-10: 1
AUDIT-C TOTAL SCORE: 0
HOW OFTEN DO YOU HAVE 6 OR MORE DRINKS ON ONE OCCASION: NEVER

## 2024-09-18 ASSESSMENT — ACTIVITIES OF DAILY LIVING (ADL)
HEARING - LEFT EAR: FUNCTIONAL
TOILETING: INDEPENDENT
ADEQUATE_TO_COMPLETE_ADL: YES
LACK_OF_TRANSPORTATION: NO
BATHING: INDEPENDENT
FEEDING YOURSELF: INDEPENDENT
JUDGMENT_ADEQUATE_SAFELY_COMPLETE_DAILY_ACTIVITIES: YES
HEARING - RIGHT EAR: FUNCTIONAL
WALKS IN HOME: INDEPENDENT
DRESSING YOURSELF: INDEPENDENT
GROOMING: INDEPENDENT
PATIENT'S MEMORY ADEQUATE TO SAFELY COMPLETE DAILY ACTIVITIES?: YES

## 2024-09-18 ASSESSMENT — PAIN - FUNCTIONAL ASSESSMENT
PAIN_FUNCTIONAL_ASSESSMENT: 0-10

## 2024-09-18 ASSESSMENT — PAIN SCALES - GENERAL
PAINLEVEL_OUTOF10: 2
PAINLEVEL_OUTOF10: 8
PAINLEVEL_OUTOF10: 9
PAINLEVEL_OUTOF10: 6
PAINLEVEL_OUTOF10: 3
PAINLEVEL_OUTOF10: 8
PAINLEVEL_OUTOF10: 2

## 2024-09-18 ASSESSMENT — PAIN DESCRIPTION - LOCATION
LOCATION: HEAD
LOCATION: HEAD

## 2024-09-18 ASSESSMENT — PAIN DESCRIPTION - PROGRESSION: CLINICAL_PROGRESSION: GRADUALLY IMPROVING

## 2024-09-18 NOTE — PROGRESS NOTES
norvascPharmacy Admission Order Reconciliation Review    Stacey Heath is a 54 y.o. female admitted for Shortness of breath. Pharmacy reviewed the patient's unreconciled admission medications.    Prior to admission medications that were reviewed and acted on by the pharmacist include:  Melatonin  Loperamide  Norvasc  Sumatriptan   These medications have been reconciled.     Any other unreconcilied medications have been addressed and will be ordered or held by the patient's medical team. Medications addressed by the pharmacist may be added or changed by the patient's medical team at any time.    Milly Bowen, PharmD  Transitions of Care Pharmacist  [facility]  Please reach out via Secure Chat for questions

## 2024-09-18 NOTE — PROGRESS NOTES
Stacey Heath  Age: 54 y.o.  MRN: [id number]  Date: 9/18/2024  Location of service: phone call    Program Details  Medicaid Community Clinical Case Management  Status: Enrolled  Effective Dates: 10/17/2023 - present  Responsible Staff: NAREN Davidson      Goals Reviewed:  Problem: Anxiety       Goal: Attempts to manage anxiety with help       Priority: High         Goal: Verbalizes ways to manage anxiety       Priority: High         Goal: Implements measures to reduce anxiety       Priority: High        Problem: Financial Stressors       Goal: Assistance with financial concerns         Problem: Kidney Issues       Goal: Improve health to get on kidney transplant list       Priority: High              Summary:  This provider made successful contact with patient via telephone call. Provider listened with empathy as patient explained why she was being admitted to [facility]. Provider and patient were able to coordinate a time for provider to stop by patient's home today to drop off an air conditioner to help with the summer heat of the home. This provider dropped the item off at the patient's home at 2:45pm.                      NAREN Davidson

## 2024-09-18 NOTE — CARE PLAN
The patient's goals for the shift include eat lunch    The clinical goals for the shift include discontinue cardene drip      Problem: Chronic Conditions and Co-morbidities  Goal: Patient's chronic conditions and co-morbidity symptoms are monitored and maintained or improved  Outcome: Progressing  Flowsheets (Taken 9/18/2024 4183)  Care Plan - Patient's Chronic Conditions and Co-Morbidity Symptoms are Monitored and Maintained or Improved: Monitor and assess patient's chronic conditions and comorbid symptoms for stability, deterioration, or improvement     Problem: Safety - Adult  Goal: Free from fall injury  Outcome: Met     Problem: Fall/Injury  Goal: Not fall by end of shift  Outcome: Met  Goal: Be free from injury by end of the shift  Outcome: Met  Goal: Verbalize understanding of personal risk factors for fall in the hospital  Outcome: Met  Goal: Verbalize understanding of risk factor reduction measures to prevent injury from fall in the home  Outcome: Met

## 2024-09-18 NOTE — HOSPITAL COURSE
Brief HPI  Stacey Heath is a 54 y.o. 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 Patient was transferred to the ICU for hypertensive crisis with SBP>200. Patient has history of refractory hypertension on multiple antihypertensives including: carvedilol, clonidine, hydralazine, isosorbitde mononitrate, valsartan, and Nifedipine ER. Patient is able to urinate and takes toremide on non-dialysis days as well. When patient initially presented to the Ed, she endorsed chest pain, palpitations and headache.     Patient arrived to the MICU with a SBP greater than 200. The patient was placed on titratable nicardipine drip when she arrived. Her home medication was reviewed and she was restarted on her home antihypertensives as well. Furthermore, the patient received Tylenol PRN and one dose of her home Sumatriptan for her migraine headache. On imaging, a head Ct indicated no acute intracranial process and CXR showed a small left pleural effusion. Her BP stabilized on the drip and the Nephrology and Renal Care team were consulted. Patient was scheduled for additional dialysis on 9/18/2024. During her dialysis session, her systolic blood pressure decreased to 88 per nursing, and so her BP medications were held, including the Nicardipine drip. UF was decreased and her blood pressure stabilized.    She was ultimately discharged home on 9/20/24 with refills on her medications and outpatient follow-up.

## 2024-09-18 NOTE — CONSULTS
"Stacey Heath   54 y.o.      Vitals:    09/17/24 1138   Weight: 53.5 kg (118 lb)      MRN/Room: [id number]    HPI:  Stacey Heath is a 54 y.o. female with PMH ESRD on HD TTS (RUE AVF), HTN, HFpEF, DM2, COPD, history of DVT who is currently admitted to the MICU on account of hypertensive emergency in the setting of missed HD. Nephrology consulted to facilitate inpatient HD.     Presented 09/17 due to chest pain and headache. Missed HD on 09/17, last HD was Saturday 09/14. Blood pressures were found to be >200 systolic, requiring Nicardipine drip and admission to MICU for same. She is being dialyzed in the MICU this afternoon.     BP (!) 130/109   Pulse 73   Temp 36 °C (96.8 °F) (Temporal)   Resp 17   Ht 1.397 m (4' 7\")   Wt 53.5 kg (118 lb)   SpO2 98%   BMI 27.43 kg/m²      Past Medical History:   Diagnosis Date    Bacteremia due to methicillin resistant Staphylococcus aureus 07/08/2024    Cardiac/pericardial tamponade ([facility]) 01/20/2024    CHF (congestive heart failure) (Multi)     COPD (chronic obstructive pulmonary disease) (Multi)     Coronary artery disease     Disorder of sweat glands 02/25/2023    Dry eye syndrome of bilateral lacrimal glands 03/07/2017    Dry eyes    ESRD (end stage renal disease) (Multi)     Essential (primary) hypertension 12/27/2022    Hypertension    History of acute pancreatitis 12/21/2020    History of acute pancreatitis    Low grade squamous intraepithelial lesion (LGSIL) on cervicovaginal cytologic smear 02/25/2023    Migraines     History of migraine    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    Type 2 myocardial infarction (Multi) 01/20/2024      Past Surgical History:   Procedure Laterality Date    APPENDECTOMY  03/07/2017    Appendectomy    CT ABDOMEN ANGIOGRAM W AND/OR WO IV CONTRAST  4/23/2023    CT ABDOMEN ANGIOGRAM W AND/OR WO IV CONTRAST [facility]    OTHER SURGICAL HISTORY  03/07/2017    Cystoscopy With Pyeloscopy With Removal Of Calculus    OTHER " SURGICAL HISTORY  03/07/2017    Anoscopy For Polyp Removal    OTHER SURGICAL HISTORY  12/08/2021    Arteriovenous fistula creation procedure    OTHER SURGICAL HISTORY  12/08/2021    Dialysis tunneled catheter placement    TUBAL LIGATION  03/07/2017    Tubal Ligation      Family History   Problem Relation Name Age of Onset    Other (Cerebrovascular Accident) Father      Heart failure Paternal Grandmother      Breast cancer Paternal Grandmother      Other (Primary Cervical Cancer) Paternal Grandmother      Diabetes Paternal Grandfather      Breast cancer Father's Sister      Ovarian cancer Father's Sister       Social History     Socioeconomic History    Marital status: Single     Spouse name: Not on file    Number of children: Not on file    Years of education: Not on file    Highest education level: Not on file   Occupational History    Not on file   Tobacco Use    Smoking status: Former     Types: Cigarettes     Passive exposure: Past    Smokeless tobacco: Never   Vaping Use    Vaping status: Never Used   Substance and Sexual Activity    Alcohol use: Not Currently    Drug use: Not Currently     Types: Cocaine, Marijuana    Sexual activity: Defer   Other Topics Concern    Not on file   Social History Narrative    Not on file     Social Determinants of Health     Financial Resource Strain: Low Risk  (9/18/2024)    Overall Financial Resource Strain (CARDIA)     Difficulty of Paying Living Expenses: Not hard at all   Food Insecurity: No Food Insecurity (7/15/2024)    Hunger Vital Sign     Worried About Running Out of Food in the Last Year: Never true     Ran Out of Food in the Last Year: Never true   Transportation Needs: No Transportation Needs (9/18/2024)    PRAPARE - Transportation     Lack of Transportation (Medical): No     Lack of Transportation (Non-Medical): No   Physical Activity: Sufficiently Active (7/22/2024)    Exercise Vital Sign     Days of Exercise per Week: 4 days     Minutes of Exercise per Session:  60 min   Stress: No Stress Concern Present (7/15/2024)    [employer] - Occupational Stress Questionnaire     Feeling of Stress : Only a little   Social Connections: Moderately Integrated (7/15/2024)    Social Connection and Isolation Panel [NHANES]     Frequency of Communication with Friends and Family: More than three times a week     Frequency of Social Gatherings with Friends and Family: Once a week     Attends Synagogue Services: More than 4 times per year     Active Member of Clubs or Organizations: No     Attends Club or Organization Meetings: Not on file     Marital Status: Living with partner   Intimate Partner Violence: Not At Risk (7/15/2024)    Humiliation, Afraid, Rape, and Kick questionnaire     Fear of Current or Ex-Partner: No     Emotionally Abused: No     Physically Abused: No     Sexually Abused: No   Housing Stability: Low Risk  (9/18/2024)    Housing Stability Vital Sign     Unable to Pay for Housing in the Last Year: No     Number of Times Moved in the Last Year: 1     Homeless in the Last Year: No       Allergies   Allergen Reactions    Iodine Hives, Itching and Unknown    Bioflavonoids Swelling    Codeine Itching, Hives and Unknown     Tolerates percocet   Tolerates percocet    Tolerates percocet    Flowers Itching    Hydromorphone Itching    Shellfish Containing Products Swelling     SEAFOOD        Prior to Admission Medications   Prescriptions Last Dose Informant Patient Reported? Taking?   B complex-vitamin C-folic acid (Nephrocaps) 1 mg capsule 9/16/2024 at patient request refills Self No No   Sig: Take 1 capsule by mouth once daily.   NIFEdipine ER (Adalat CC) 90 mg 24 hr tablet 9/16/2024 at patient request refills Self No No   Sig: Take 1 tablet (90 mg) by mouth once daily in the morning. Take before meals. Do not crush, chew, or split.   SUMAtriptan (Imitrex) 25 mg tablet Unknown at patient request refills Self Yes No   Sig: Take 1 tablet (25 mg) by mouth 1  time if needed for migraine. May repeat dose once in 2 hours if no relief.  Do not exceed 2 doses in 24 hours.   albuterol 1.25 mg/3 mL nebulizer solution 9/16/2024 at patient request refills Self No Yes   Sig: Take 3 mL (1.25 mg) by nebulization every 6 hours if needed for wheezing.   albuterol sulfate (Proair Digihaler) 90 mcg/actuation aero powdr breath act w/sensor inhaler 9/16/2024 at patient request refills Self Yes No   Sig: Inhale 2 puffs 2 times a day as needed for wheezing or shortness of breath.   amLODIPine (Norvasc) 10 mg tablet Unknown at patient request refills Self Yes No   Sig: Take 1 tablet (10 mg) by mouth once daily.   apixaban (Eliquis) 5 mg tablet 9/16/2024 at patient request refills Self No No   Sig: Take 1 tablet (5 mg) by mouth 2 times a day.   aspirin 81 mg EC tablet 9/16/2024 at patient request refills Self Yes No   Sig: Take 1 tablet (81 mg) by mouth once daily.   atorvastatin (Lipitor) 80 mg tablet 9/16/2024 at patient request refills Self No No   Sig: Take 1 tablet (80 mg) by mouth once daily at bedtime.   benzonatate (Tessalon) 100 mg capsule Unknown at patient request refills Self Yes No   Sig: Take 1 capsule (100 mg) by mouth 3 times a day as needed for cough. Do not crush or chew.   calcium acetate (Phoslo) 667 mg capsule 9/16/2024 Self No No   Sig: Take 2 capsules (1,334 mg) by mouth 3 times daily (morning, midday, late afternoon).   carvedilol (Coreg) 25 mg tablet 9/16/2024 at patient request refills Self No Yes   Sig: Take 2 tablets (50 mg) by mouth 2 times a day.   cloNIDine (Catapres) 0.2 mg tablet 9/16/2024 at patient request refills Self No Yes   Sig: Take 1 tablet (0.2 mg) by mouth 3 times a day.   diphenhydrAMINE (BENADryl) 25 mg capsule 9/16/2024 at patient request refills Self Yes No   Sig: Take 1 capsule (25 mg) by mouth as needed at bedtime for itching or sleep.   epoetin joceline (Epogen,Procrit) 10,000 unit/mL injection 9/17/2024 Self No Yes   Sig: Inject 1 mL (10,000  Units) under the skin 3 times a week.   fluticasone (Flonase) 50 mcg/actuation nasal spray 9/16/2024 Self No No   Sig: Administer 2 sprays into each nostril once daily. Shake gently. Before first use, prime pump. After use, clean tip and replace cap.   fluticasone furoate-vilanteroL (Breo Ellipta) 100-25 mcg/dose inhaler Past Week at patient request refills Self No Yes   Sig: Inhale 1 puff once daily.   gabapentin (Neurontin) 300 mg capsule Unknown at patient request refills Self No No   Sig: Take 1 capsule (300 mg) by mouth once daily at bedtime.   hydrALAZINE (Apresoline) 100 mg tablet 9/16/2024 at patient request refills Self No No   Sig: Take 1 tablet (100 mg) by mouth 3 times a day.   hydrOXYzine HCL (Atarax) 25 mg tablet 9/16/2024 Self No No   Sig: Take 1 tablet (25 mg) by mouth every 6 hours if needed for anxiety.   isosorbide mononitrate ER (Imdur) 120 mg 24 hr tablet 9/16/2024 at patient request refills Self No No   Sig: Take 1 tablet (120 mg) by mouth once daily. Do not crush or chew.   loperamide (Imodium) 2 mg capsule Unknown at patient request refills Self Yes No   Sig: Take 1 capsule (2 mg) by mouth 4 times a day as needed for diarrhea.   melatonin 5 mg capsule Unknown at patient request refills Self Yes No   Sig: Take 1 capsule (5 mg) by mouth once daily at bedtime.   ondansetron (Zofran) 4 mg tablet Unknown at patient request refills Self Yes No   Sig: Take 1 tablet (4 mg) by mouth every 8 hours if needed for nausea or vomiting.   pantoprazole (ProtoNix) 40 mg EC tablet Unknown at patient request refills Self Yes No   Sig: Take 1 tablet (40 mg) by mouth once daily in the morning. Take before meals. Do not crush, chew, or split.   polyethylene glycol (Glycolax, Miralax) 17 gram/dose powder 9/16/2024 at patient request refills Self No No   Sig: Take 17 g (1 scoop dissolved in liquid) by mouth once daily. Do not start before July 26, 2024.   sodium chloride (Ocean) 0.65 % nasal spray 9/16/2024 Self No  No   Sig: Administer 1 spray into each nostril 4 times a day as needed for congestion.   torsemide (Demadex) 20 mg tablet 9/16/2024 at patient request refills Self No No   Sig: Take 2 tablets once daily on non-dialysis days only. Do not take on dialysis days   valsartan (Diovan) 320 mg tablet 9/17/2024 at patient request refills Self No Yes   Sig: Take 1 tablet (320 mg) by mouth once daily.      Facility-Administered Medications: None        Meds:   apixaban, 5 mg, BID  aspirin, 81 mg, Daily  atorvastatin, 80 mg, Nightly  calcium acetate, 1,334 mg, TID  [START ON 9/19/2024] carvedilol, 50 mg, BID  cloNIDine, 0.2 mg, q8h BELEN  epoetin joceline, 10,000 Units, Once per day on Monday Wednesday Friday  fluticasone, 2 spray, Daily  fluticasone furoate-vilanteroL, 1 puff, Daily  gabapentin, 300 mg, Nightly  hydrALAZINE, 100 mg, TID  isosorbide mononitrate ER, 120 mg, Daily  NIFEdipine ER, 90 mg, Daily before breakfast  [START ON 9/19/2024] pantoprazole, 40 mg, Daily before breakfast  polyethylene glycol, 17 g, Daily  valsartan, 320 mg, Daily      niCARdipine, Last Rate: Stopped (09/18/24 1545)      acetaminophen, 975 mg, q6h PRN   Or  acetaminophen, 1,000 mg, q6h PRN   Or  acetaminophen, 950 mg, q6h PRN  albuterol, 2.5 mg, q6h PRN  [Transfer Hold] hydrALAZINE, 10 mg, q4h PRN  hydrOXYzine HCL, 25 mg, q6h PRN  melatonin, 3 mg, Nightly PRN  SUMAtriptan, 25 mg, q2h PRN        Vitals:    09/18/24 1815   BP:    Pulse: 73   Resp: 17   Temp:    SpO2: 98%        No intake/output data recorded.      General appearance: AAOx3. No distress  Eyes: non-icteric  Skin: no apparent rash  Heart: S1 S2 regular  Lungs: CTA bilaterally,  No wheezing/crackles  Abdomen: soft, nt/nd  Extremities: No edema bilaterally  Neuro: No FND  ACCESS: RUE AVF     Blood Labs:  Results for orders placed or performed during the hospital encounter of 09/17/24 (from the past 24 hour(s))   Type And Screen   Result Value Ref Range    ABO TYPE AB     Rh TYPE NEG      ANTIBODY SCREEN NEG    Blood Gas Venous Full Panel   Result Value Ref Range    POCT pH, Venous 7.38 7.33 - 7.43 pH    POCT pCO2, Venous 41 41 - 51 mm Hg    POCT pO2, Venous 46 (H) 35 - 45 mm Hg    POCT SO2, Venous 70 45 - 75 %    POCT Oxy Hemoglobin, Venous 69.2 45.0 - 75.0 %    POCT Hematocrit Calculated, Venous 30.0 (L) 36.0 - 46.0 %    POCT Sodium, Venous 138 136 - 145 mmol/L    POCT Potassium, Venous 5.1 3.5 - 5.3 mmol/L    POCT Chloride, Venous 102 98 - 107 mmol/L    POCT Ionized Calicum, Venous 1.18 1.10 - 1.33 mmol/L    POCT Glucose, Venous 140 (H) 74 - 99 mg/dL    POCT Lactate, Venous 1.2 0.4 - 2.0 mmol/L    POCT Base Excess, Venous -0.8 -2.0 - 3.0 mmol/L    POCT HCO3 Calculated, Venous 24.3 22.0 - 26.0 mmol/L    POCT Hemoglobin, Venous 10.1 (L) 12.0 - 16.0 g/dL    POCT Anion Gap, Venous 17.0 10.0 - 25.0 mmol/L    Patient Temperature 37.0 degrees Celsius    FiO2 21 %        ASSESSMENT:  Stacey Heath is a 54 y.o. female with PMH ESRD on HD TTS (RUE AVF), HTN, HFpEF, DM2, COPD, history of DVT who is currently admitted to the MICU on account of hypertensive emergency in the setting of missed HD. Nephrology consulted to facilitate inpatient HD.     Presented 09/17 due to chest pain and headache. Missed HD on 09/17, last HD was Saturday 09/14. Blood pressures were found to be >200 systolic, requiring Nicardipine drip and admission to MICU for same. She is being dialyzed in the MICU this afternoon.     #ESRD on HD TTS  -HD unit: [facility]   -Primary nephrologist: Dr Barraza  -Schedule TTS  -Access: RUE AVF     RECOMMENDATIONS:  -iHD this afternoon as per submitted orders  -Will follow     Jacob Floyd MD   Nephrology fellow   Nephrology pager [phone]  Available via [employer]

## 2024-09-18 NOTE — PROGRESS NOTES
Pharmacy Medication History Review    Stacey Heath is a 54 y.o. female admitted for Shortness of breath. Pharmacy reviewed the patient's [id number] medications and allergies for accuracy.    Medications ADDED:  AMLODIPINE 10 MG   BENZONATE 100 NG   LOPERMIDE 2 MG   MELETONIN 5 MG  ONDANSETRON 4 MG   PANTOPRAZOLE 40 MG   ENTRESTO  MG   SUMATRIPTAN 25 MG   Medications CHANGED:  NONE   Medications REMOVED:   NONE     The list below reflects the updated PTA list.   Prior to Admission Medications   Prescriptions Last Dose Informant   B complex-vitamin C-folic acid (Nephrocaps) 1 mg capsule 9/16/2024 at patient request refills Self   Sig: Take 1 capsule by mouth once daily.   NIFEdipine ER (Adalat CC) 90 mg 24 hr tablet 9/16/2024 at patient request refills Self   Sig: Take 1 tablet (90 mg) by mouth once daily in the morning. Take before meals. Do not crush, chew, or split.   SUMAtriptan (Imitrex) 25 mg tablet Unknown at patient request refills Self   Sig: Take 1 tablet (25 mg) by mouth 1 time if needed for migraine. May repeat dose once in 2 hours if no relief.  Do not exceed 2 doses in 24 hours.   albuterol 1.25 mg/3 mL nebulizer solution 9/16/2024 at patient request refills Self   Sig: Take 3 mL (1.25 mg) by nebulization every 6 hours if needed for wheezing.   albuterol sulfate (Proair Digihaler) 90 mcg/actuation aero powdr breath act w/sensor inhaler 9/16/2024 at patient request refills Self   Sig: Inhale 2 puffs 2 times a day as needed for wheezing or shortness of breath.   amLODIPine (Norvasc) 10 mg tablet Unknown at patient request refills Self   Sig: Take 1 tablet (10 mg) by mouth once daily.   apixaban (Eliquis) 5 mg tablet 9/16/2024 at patient request refills Self   Sig: Take 1 tablet (5 mg) by mouth 2 times a day.   aspirin 81 mg EC tablet 9/16/2024 at patient request refills Self   Sig: Take 1 tablet (81 mg) by mouth once daily.   atorvastatin (Lipitor) 80 mg tablet 9/16/2024 at patient request  refills Self   Sig: Take 1 tablet (80 mg) by mouth once daily at bedtime.   benzonatate (Tessalon) 100 mg capsule Unknown at patient request refills Self   Sig: Take 1 capsule (100 mg) by mouth 3 times a day as needed for cough. Do not crush or chew.   calcium acetate (Phoslo) 667 mg capsule 9/16/2024 Self   Sig: Take 2 capsules (1,334 mg) by mouth 3 times daily (morning, midday, late afternoon).   carvedilol (Coreg) 25 mg tablet 9/16/2024 at patient request refills Self   Sig: Take 2 tablets (50 mg) by mouth 2 times a day.   cloNIDine (Catapres) 0.2 mg tablet 9/16/2024 at patient request refills Self   Sig: Take 1 tablet (0.2 mg) by mouth 3 times a day.   diphenhydrAMINE (BENADryl) 25 mg capsule 9/16/2024 at patient request refills Self   Sig: Take 1 capsule (25 mg) by mouth as needed at bedtime for itching or sleep.   epoetin joceline (Epogen,Procrit) 10,000 unit/mL injection 9/17/2024 Self   Sig: Inject 1 mL (10,000 Units) under the skin 3 times a week.   fluticasone (Flonase) 50 mcg/actuation nasal spray 9/16/2024 Self   Sig: Administer 2 sprays into each nostril once daily. Shake gently. Before first use, prime pump. After use, clean tip and replace cap.   fluticasone furoate-vilanteroL (Breo Ellipta) 100-25 mcg/dose inhaler Past Week at patient request refills Self   Sig: Inhale 1 puff once daily.   gabapentin (Neurontin) 300 mg capsule Unknown at patient request refills Self   Sig: Take 1 capsule (300 mg) by mouth once daily at bedtime.   hydrALAZINE (Apresoline) 100 mg tablet 9/16/2024 at patient request refills Self   Sig: Take 1 tablet (100 mg) by mouth 3 times a day.   hydrOXYzine HCL (Atarax) 25 mg tablet 9/16/2024 Self   Sig: Take 1 tablet (25 mg) by mouth every 6 hours if needed for anxiety.   isosorbide mononitrate ER (Imdur) 120 mg 24 hr tablet 9/16/2024 at patient request refills Self   Sig: Take 1 tablet (120 mg) by mouth once daily. Do not crush or chew.   loperamide (Imodium) 2 mg capsule Unknown at  patient request refills Self   Sig: Take 1 capsule (2 mg) by mouth 4 times a day as needed for diarrhea.   melatonin 5 mg capsule Unknown at patient request refills Self   Sig: Take 1 capsule (5 mg) by mouth once daily at bedtime.   ondansetron (Zofran) 4 mg tablet Unknown at patient request refills Self   Sig: Take 1 tablet (4 mg) by mouth every 8 hours if needed for nausea or vomiting.   pantoprazole (ProtoNix) 40 mg EC tablet Unknown at patient request refills Self   Sig: Take 1 tablet (40 mg) by mouth once daily in the morning. Take before meals. Do not crush, chew, or split.   polyethylene glycol (Glycolax, Miralax) 17 gram/dose powder 9/16/2024 at patient request refills Self   Sig: Take 17 g (1 scoop dissolved in liquid) by mouth once daily. Do not start before July 26, 2024.   sodium chloride (Ocean) 0.65 % nasal spray 9/16/2024 Self   Sig: Administer 1 spray into each nostril 4 times a day as needed for congestion.   torsemide (Demadex) 20 mg tablet 9/16/2024 at patient request refills Self   Sig: Take 2 tablets once daily on non-dialysis days only. Do not take on dialysis days   valsartan (Diovan) 320 mg tablet 9/17/2024 at patient request refills Self   Sig: Take 1 tablet (320 mg) by mouth once daily.      Facility-Administered Medications: None          The list below reflects the updated allergy list. Please review each documented allergy for additional clarification and justification.  Allergies  Reviewed by Dylon Calderon on 9/18/2024        Severity Reactions Comments    Iodine High Hives, Itching, Unknown     Bioflavonoids Not Specified Swelling     Codeine Not Specified Itching, Hives, Unknown Tolerates percocet   Tolerates percocet Tolerates percocet    Flowers Not Specified Itching     Hydromorphone Not Specified Itching     Shellfish Containing Products Not Specified Swelling SEAFOOD            Patient accepts M2B at discharge.     Sources:   Patient interview   Epic dispense history  Epic  "allergy list   OARRS ( YES )  8/23/2024 PROGRESS NOTE PHARMACY ( [facility] )     Additional Comments:  Patient is a good historian - patient knows medication name dose and frequency -patient request refills for all medications   OARRS   4/24/2024 GABAPENTIN 300 MG DS: 30 QTY: 30     Dylon Calderon  Pharmacy Technician  09/18/24     Secure Chat preferred   If no response call [email or web address] or [email or web address] \"Med Rec\"   "

## 2024-09-18 NOTE — H&P
Medical Intensive Care - History and Physical   Subjective    Stacey Heath is a 54 y.o. year old female patient admitted on 9/17/2024 with following ICU needs: Nicardipine drip for Hypertensive Emergency    HPI:  Stacey Heath is a 54 y.o. 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 Patient was transferred to the ICU for hypertensive crisis with SBP>200.Patient has history of refractory hypertension on multiple antihypertensives including: carvedilol, clonidine, hydralazine, isosorbitde mononitrate, valsartan, and Nifedipine ER. Patient is able to urinate and takes toremide on non-dialysis days as well. When patient initially presented to the Ed, she endorsed chest pain, ,palpitations and headache, however on current examination the patient states that chest pain has been resolved and currently only endorses migraine headache.     ED Course  - Given 325 ASA for complaint of chest pain and 625mg tylenol   - 10mcg IM hydralazine once and 10mg IV labetalol once. Started 100mg TID PO hydralazine  - 0.2mg clonidine  - Contacted nephrology, dialysis orders in place - likely in the morning given no acute/emergent need now   - Carvedilol 50mg, isosorbid mononitrate 120mg and torsemide 40mg ordered for BP still in 210s      Review of Systems:  Review of Systems   Constitutional:  Negative for chills and fever.   HENT:  Negative for sinus pressure and sinus pain.    Respiratory:  Negative for apnea, chest tightness and shortness of breath.    Cardiovascular:  Negative for chest pain and palpitations.   Gastrointestinal:  Positive for diarrhea and nausea. Negative for abdominal distention and abdominal pain.   Genitourinary:  Positive for decreased  urine volume.   Musculoskeletal:  Negative for back pain.   Neurological:  Positive for dizziness and headaches. Negative for syncope and light-headedness.          Meds    Home medications:  Current Outpatient Medications   Medication Instructions   • albuterol sulfate (Proair Digihaler) 90 mcg/actuation aero powdr breath act w/sensor inhaler 2 puffs, inhalation, 2 times daily PRN   • albuterol 1.25 mg, nebulization, Every 6 hours PRN   • amLODIPine (NORVASC) 10 mg, oral, Daily   • aspirin 81 mg, oral, Daily   • atorvastatin (LIPITOR) 80 mg, oral, Nightly   • benzonatate (TESSALON) 100 mg, oral, 3 times daily PRN, Do not crush or chew.   • calcium acetate (PHOSLO) 1,334 mg, oral, 3 times daily (morning, midday, late afternoon)   • carvedilol (COREG) 50 mg, oral, 2 times daily   • cloNIDine (CATAPRES) 0.2 mg, oral, 3 times daily   • diphenhydrAMINE (BENADRYL) 25 mg, oral, Nightly PRN   • Eliquis 5 mg, oral, 2 times daily   • epoetin joceline (EPOGEN,PROCRIT) 10,000 Units, subcutaneous, 3 times weekly   • fluticasone (Flonase) 50 mcg/actuation nasal spray 2 sprays, Each Nostril, Daily, Shake gently. Before first use, prime pump. After use, clean tip and replace cap.   • fluticasone furoate-vilanteroL (Breo Ellipta) 100-25 mcg/dose inhaler 1 puff, inhalation, Daily RT   • gabapentin (NEURONTIN) 300 mg, oral, Nightly   • hydrALAZINE (APRESOLINE) 100 mg, oral, 3 times daily   • hydrOXYzine HCL (ATARAX) 25 mg, oral, Every 6 hours PRN   • isosorbide mononitrate ER (IMDUR) 120 mg, oral, Daily, Do not crush or chew.   • loperamide (IMODIUM) 2 mg, oral, 4 times daily PRN   • melatonin 5 mg, oral, Nightly   • NIFEdipine ER (ADALAT CC) 90 mg, oral, Daily before breakfast, Do not crush, chew, or split.   • ondansetron (ZOFRAN) 4 mg, oral, Every 8 hours PRN   • pantoprazole (PROTONIX) 40 mg, oral, Daily before breakfast, Do not crush, chew, or split.   • polyethylene glycol (Glycolax, Miralax) 17 gram/dose powder Take 17 g (1 scoop  "dissolved in liquid) by mouth once daily. Do not start before July 26, 2024.   • sodium chloride (Ocean) 0.65 % nasal spray 1 spray, Each Nostril, 4 times daily PRN   • SUMAtriptan (IMITREX) 25 mg, oral, Once as needed, May repeat dose once in 2 hours if no relief.  Do not exceed 2 doses in 24 hours.   • torsemide (Demadex) 20 mg tablet Take 2 tablets once daily on non-dialysis days only. Do not take on dialysis days   • valsartan (DIOVAN) 320 mg, oral, Daily        Inpatient medications:  Scheduled medications  apixaban, 5 mg, oral, BID  aspirin, 81 mg, oral, Daily  atorvastatin, 80 mg, oral, Nightly  calcium acetate, 1,334 mg, oral, TID  cloNIDine, 0.2 mg, oral, q8h BELEN  epoetin joceline, 10,000 Units, subcutaneous, Once per day on Monday Wednesday Friday  fluticasone, 2 spray, Each Nostril, Daily  fluticasone furoate-vilanteroL, 1 puff, inhalation, Daily  gabapentin, 300 mg, oral, Nightly  hydrALAZINE, 100 mg, oral, TID  NIFEdipine ER, 90 mg, oral, Daily before breakfast  polyethylene glycol, 17 g, oral, Daily  valsartan, 320 mg, oral, Daily      Continuous medications  niCARdipine, 2.5-15 mg/hr, Last Rate: Stopped (09/18/24 1235)      PRN medications  PRN medications: acetaminophen **OR** acetaminophen **OR** acetaminophen, albuterol, [Transfer Hold] hydrALAZINE, hydrOXYzine HCL, melatonin     Objective    Blood pressure 144/68, pulse 73, temperature 35.8 °C (96.4 °F), resp. rate 14, height 1.397 m (4' 7\"), weight 53.5 kg (118 lb), SpO2 100%.     Physical Exam  HENT:      Head: Normocephalic and atraumatic.   Eyes:      General:         Right eye: No discharge.         Left eye: No discharge.      Extraocular Movements: Extraocular movements intact.      Pupils: Pupils are equal, round, and reactive to light.   Cardiovascular:      Rate and Rhythm: Normal rate and regular rhythm.      Pulses: Normal pulses.      Heart sounds: Murmur heard.      Comments: Systolic murmur upper right sternal border  Pulmonary:      " Effort: No respiratory distress.   Abdominal:      General: Abdomen is flat. There is no distension.      Palpations: Abdomen is soft.      Tenderness: There is no abdominal tenderness. There is no guarding or rebound.   Musculoskeletal:         General: No swelling or tenderness.   Neurological:      General: No focal deficit present.      Mental Status: She is alert and oriented to person, place, and time. Mental status is at baseline.      Motor: No weakness.            Intake/Output Summary (Last 24 hours) at 9/18/2024 1308  Last data filed at 9/18/2024 1145  Gross per 24 hour   Intake 10.83 ml   Output --   Net 10.83 ml     Labs:   Results from last 72 hours   Lab Units 09/17/24  1411   SODIUM mmol/L 141   POTASSIUM mmol/L 5.1   CHLORIDE mmol/L 100   CO2 mmol/L 24   BUN mg/dL 60*   CREATININE mg/dL 11.98*   GLUCOSE mg/dL 119*   CALCIUM mg/dL 9.5   ANION GAP mmol/L 22*   EGFR mL/min/1.73m*2 3*      Results from last 72 hours   Lab Units 09/17/24  1411   WBC AUTO x10*3/uL 4.7   HEMOGLOBIN g/dL 10.9*   HEMATOCRIT % 37.0   PLATELETS AUTO x10*3/uL 240   NEUTROS PCT AUTO % 62.3   LYMPHS PCT AUTO % 16.4   MONOS PCT AUTO % 8.1   EOS PCT AUTO % 10.9          Results from last 72 hours   Lab Units 09/18/24  1101   POCT PH, VENOUS pH 7.38   POCT PCO2, VENOUS mm Hg 41   POCT PO2, VENOUS mm Hg 46*        Micro/ID:     Lab Results   Component Value Date    URINECULTURE NO SIGNIFICANT GROWTH. 07/09/2023    BLOODCULT  08/21/2023     No Growth at 1 days~No Growth at 2 days~No Growth at 3 days~NO GROWTH at 4 days - FINAL REPORT       Summary of Key Imaging Results  CXR -Small left pleural effusion, re demonstration of atelectatic changes of the lingula  CT Head wo IV contrast: No acute intracranial hemorrhage, mass effect or CT apparent acute infarct.    Assessment and Plan     Assessment:  Stacey Heath is a 54 y.o. year old female patient admitted to the MICU on 09/18/24 for Hypertensive Emergency requiring Nicardipine  drip    Mechanical Ventilation: none  Sedation/Analgesia:  none  Restraints: no    Plan:  NEUROLOGY/PSYCH:  Dx:  Hx of Migraine Headaches  Management:  CT head unremarkable for acute intracranial process  Resumed home Sumatriptin for 2 doses if headache does not resolve  Tylenol PRN    CARDIOVASCULAR:  Dx:  Hypertensive Emergency  HfpEF  Management:  History of Refractory HTN on multiple medications  Echo 4/2024 EF 60-65%, moderately increased left ventricular septal thickness with pseudonormal left ventricular diastolic filling pattern. Severe concentric left ventricular hypertrophy. Moderate tricuspid regurgitation. No significant change from prior exam in 6/2023  Nuclear Stress Test 12/2023 SPECT perfusion study normal, no scintigraphic evidence for inducible ischemia or scarred myocardium  Down trending Troponins 44--->39  BNP 2561  Placed on Titratable Nicardipine drip  Resume home antihypertensives: clonidine, isosorbide mononitrate, carvedilol, Valsartan, Nifedipine ER, and hydralazine  Resume torsemide on non-dialysis days  Resume home atorvastatin  Nephrology and Renal Care consulted for dialysis  CTM Vitals            Dx:  Hx of Atrial fibrillation           Management:               Patient endorses history of atrial fibrillation, not in chart. Currently on Elliquis for DVT, will continue Elliquis               CTM telemetry               Home Aspirin EC 81 mg    PULMONARY:  Dx:  COPD  Asthma  Management:  VBG unremarkable: pH 7.38, pCO2 41, Lactate 1.2  Resume home fluticasone nasal spray daily  Resume Breo Ellipta daily.  Albuterol Nebulizer PRN    RENAL/GENITOURINARY:  Dx:  ESRD on Dialysis T/Th/Sat  Hx of Refractory HTN  Management:  Missed dialysis session on 9/17  CT Abd 5/2024: Bilateral adrenal glands appear normal. The kidneys are  atrophic and enhance symmetrically. No hydroureteronephrosis or nephroureterolithiasis is identified.  Aldosterone 9.8  Renin<0.1   Nephrology and Renal Care  consulted, scheduled for dialysis session today.  Epogen Procrit 10,000 units subcutaneous with dialysis session per note  Home Calcium Acetate Capsule TID  Serum Metanephrines Pending          GASTROENTEROLOGY:  Dx:  IBS  GERD  Management:  Patient reported one episode of diarrhea yesterday  Held Home Loperamide, will restart if patient continues to report diarrhea  Bowel Regiment- Miralax  Restarted Home Protonix    ENDOCRINOLOGY:  Dx:  T2DM  Management:  History of diabetes per notes, however patient is not currently on any diabetes medications.  POCT glucose checks  Resume home gabapentin    HEMATOLOGY:  Dx:  Hx of DVT left upper extremity brachial vein (4/2024)  Management:  Continue home Eliquis    SKIN:  Dx:  Skin Failure No         MUSCULOSKELETAL:  Dx:  Chronic Hx of Impaired mobility  Management:  PT/OT consulted    INFECTIOUS DISEASE:  Dx:  No Diagnosis      ICU Check List     ICU Liberation: Intervention:   Assess, Prevent, Manage Pain CPOT N/A N/A   Both SAT and SBT [] SAT  [] SBT 30-60 min [] Extubate to NC  [] Extubate to NIV  [] Discuss Trach   Choice of analgesia and sedation N/A N/A   Delirium: Assess, prevent and manage N/A N/A   Early Mobility and Exercise  [x] PT /OT consult   Family Engagement and Empowerment []Family updated []SW consult     FEN  Fluids: PRN  Electrolytes: PRN  Nutrition: Renal Diet  Prophylaxis:  DVT ppx: Elliquis  GI ppx: Protonix  Bowel care: Miralax  Hardware:                   Social:  Code: Full Code    Contact: Diya Jang [phone]  Disposition: TED [place],    09/18/24 at 1:08 PM     Disclaimer: Documentation completed with the information available at the time of input. The times in the chart may not be reflective of actual patient care times, interventions, or procedures. Documentation occurs after the physical care of the patient.

## 2024-09-18 NOTE — SIGNIFICANT EVENT
"Rapid Response Note     09/18/24 1031   Onset Documentation   Rapid Response Initiated By RN   Location/Room [facility]   Pager Time [phone]   Arrival Time 1034   Event End Time 1135   Level II Called Yes  (Dr. WILLIAM Solis aware of patient.)   Primary Reason for Call Staff concern  (HTN crisis)     Rapid Response paged in Emergency Room for patient in HTN crisis.  Patient endorses heache/nausea/\"feeling like my heart is beating so hard\".  EKG completed prior to Rapid Response Team RN arrival.  EKG unchanged from 9/17/24.  DACR Dr. SADIE Ford and Primary team Dr. Ruff to bedside.  Patient's Bps remain 200s/100s despite IV medications.  Patient given additional hydralazine PIV during rapid response with minimal improvement in patient's BP.  Orders placed for Cardene drip.  Patient placed on IV medication with improvement in SBP to 150s.  MICU Fellow notified of of patient's HTN and antihypertensive medications infusion.  Patient accepted to MICU.  RN report called.  Patient transferred by ED RN and [facility].    "

## 2024-09-19 ENCOUNTER — APPOINTMENT (OUTPATIENT)
Dept: DIALYSIS | Facility: HOSPITAL | Age: 54
End: 2024-09-19
Payer: COMMERCIAL

## 2024-09-19 LAB
ALBUMIN SERPL BCP-MCNC: 3.8 G/DL (ref 3.4–5)
ALBUMIN SERPL BCP-MCNC: 3.8 G/DL (ref 3.4–5)
ANION GAP SERPL CALC-SCNC: 17 MMOL/L (ref 10–20)
ANION GAP SERPL CALC-SCNC: 18 MMOL/L (ref 10–20)
ATRIAL RATE: 76 BPM
BASOPHILS # BLD AUTO: 0.06 X10*3/UL (ref 0–0.1)
BASOPHILS # BLD AUTO: 0.07 X10*3/UL (ref 0–0.1)
BASOPHILS NFR BLD AUTO: 1.4 %
BASOPHILS NFR BLD AUTO: 1.8 %
BUN SERPL-MCNC: 33 MG/DL (ref 6–23)
BUN SERPL-MCNC: 35 MG/DL (ref 6–23)
CALCIUM SERPL-MCNC: 9.1 MG/DL (ref 8.6–10.6)
CALCIUM SERPL-MCNC: 9.2 MG/DL (ref 8.6–10.6)
CHLORIDE SERPL-SCNC: 94 MMOL/L (ref 98–107)
CHLORIDE SERPL-SCNC: 95 MMOL/L (ref 98–107)
CO2 SERPL-SCNC: 24 MMOL/L (ref 21–32)
CO2 SERPL-SCNC: 28 MMOL/L (ref 21–32)
CREAT SERPL-MCNC: 8.24 MG/DL (ref 0.5–1.05)
CREAT SERPL-MCNC: 8.6 MG/DL (ref 0.5–1.05)
EGFRCR SERPLBLD CKD-EPI 2021: 5 ML/MIN/1.73M*2
EGFRCR SERPLBLD CKD-EPI 2021: 5 ML/MIN/1.73M*2
EOSINOPHIL # BLD AUTO: 0.38 X10*3/UL (ref 0–0.7)
EOSINOPHIL # BLD AUTO: 0.59 X10*3/UL (ref 0–0.7)
EOSINOPHIL NFR BLD AUTO: 11.2 %
EOSINOPHIL NFR BLD AUTO: 12.1 %
ERYTHROCYTE [DISTWIDTH] IN BLOOD BY AUTOMATED COUNT: 17.1 % (ref 11.5–14.5)
ERYTHROCYTE [DISTWIDTH] IN BLOOD BY AUTOMATED COUNT: 17.3 % (ref 11.5–14.5)
GLUCOSE SERPL-MCNC: 122 MG/DL (ref 74–99)
GLUCOSE SERPL-MCNC: 122 MG/DL (ref 74–99)
HCT VFR BLD AUTO: 29.8 % (ref 36–46)
HCT VFR BLD AUTO: 32.5 % (ref 36–46)
HGB BLD-MCNC: 10 G/DL (ref 12–16)
HGB BLD-MCNC: 9.7 G/DL (ref 12–16)
IMM GRANULOCYTES # BLD AUTO: 0.02 X10*3/UL (ref 0–0.7)
IMM GRANULOCYTES # BLD AUTO: 0.02 X10*3/UL (ref 0–0.7)
IMM GRANULOCYTES NFR BLD AUTO: 0.4 % (ref 0–0.9)
IMM GRANULOCYTES NFR BLD AUTO: 0.6 % (ref 0–0.9)
LYMPHOCYTES # BLD AUTO: 0.7 X10*3/UL (ref 1.2–4.8)
LYMPHOCYTES # BLD AUTO: 0.87 X10*3/UL (ref 1.2–4.8)
LYMPHOCYTES NFR BLD AUTO: 17.8 %
LYMPHOCYTES NFR BLD AUTO: 20.6 %
MAGNESIUM SERPL-MCNC: 2.4 MG/DL (ref 1.6–2.4)
MCH RBC QN AUTO: 28.7 PG (ref 26–34)
MCH RBC QN AUTO: 29.4 PG (ref 26–34)
MCHC RBC AUTO-ENTMCNC: 30.8 G/DL (ref 32–36)
MCHC RBC AUTO-ENTMCNC: 32.6 G/DL (ref 32–36)
MCV RBC AUTO: 88 FL (ref 80–100)
MCV RBC AUTO: 96 FL (ref 80–100)
MONOCYTES # BLD AUTO: 0.39 X10*3/UL (ref 0.1–1)
MONOCYTES # BLD AUTO: 0.55 X10*3/UL (ref 0.1–1)
MONOCYTES NFR BLD AUTO: 11.2 %
MONOCYTES NFR BLD AUTO: 11.5 %
NEUTROPHILS # BLD AUTO: 1.84 X10*3/UL (ref 1.2–7.7)
NEUTROPHILS # BLD AUTO: 2.79 X10*3/UL (ref 1.2–7.7)
NEUTROPHILS NFR BLD AUTO: 54.3 %
NEUTROPHILS NFR BLD AUTO: 57.1 %
NRBC BLD-RTO: 0 /100 WBCS (ref 0–0)
NRBC BLD-RTO: 0 /100 WBCS (ref 0–0)
P AXIS: 52 DEGREES
P OFFSET: 191 MS
P ONSET: 142 MS
PHOSPHATE SERPL-MCNC: 5.8 MG/DL (ref 2.5–4.9)
PHOSPHATE SERPL-MCNC: 5.8 MG/DL (ref 2.5–4.9)
PLATELET # BLD AUTO: 229 X10*3/UL (ref 150–450)
PLATELET # BLD AUTO: 302 X10*3/UL (ref 150–450)
POTASSIUM SERPL-SCNC: 4.9 MMOL/L (ref 3.5–5.3)
POTASSIUM SERPL-SCNC: 5.4 MMOL/L (ref 3.5–5.3)
PR INTERVAL: 152 MS
Q ONSET: 218 MS
QRS COUNT: 12 BEATS
QRS DURATION: 80 MS
QT INTERVAL: 414 MS
QTC CALCULATION(BAZETT): 465 MS
QTC FREDERICIA: 447 MS
R AXIS: 27 DEGREES
RBC # BLD AUTO: 3.38 X10*6/UL (ref 4–5.2)
RBC # BLD AUTO: 3.4 X10*6/UL (ref 4–5.2)
SODIUM SERPL-SCNC: 132 MMOL/L (ref 136–145)
SODIUM SERPL-SCNC: 134 MMOL/L (ref 136–145)
T AXIS: 185 DEGREES
T OFFSET: 425 MS
VENTRICULAR RATE: 76 BPM
WBC # BLD AUTO: 3.4 X10*3/UL (ref 4.4–11.3)
WBC # BLD AUTO: 4.9 X10*3/UL (ref 4.4–11.3)

## 2024-09-19 PROCEDURE — 97165 OT EVAL LOW COMPLEX 30 MIN: CPT | Mod: GO

## 2024-09-19 PROCEDURE — 2500000001 HC RX 250 WO HCPCS SELF ADMINISTERED DRUGS (ALT 637 FOR MEDICARE OP)

## 2024-09-19 PROCEDURE — 85025 COMPLETE CBC W/AUTO DIFF WBC: CPT

## 2024-09-19 PROCEDURE — 2500000001 HC RX 250 WO HCPCS SELF ADMINISTERED DRUGS (ALT 637 FOR MEDICARE OP): Performed by: STUDENT IN AN ORGANIZED HEALTH CARE EDUCATION/TRAINING PROGRAM

## 2024-09-19 PROCEDURE — 94640 AIRWAY INHALATION TREATMENT: CPT

## 2024-09-19 PROCEDURE — 80069 RENAL FUNCTION PANEL: CPT

## 2024-09-19 PROCEDURE — 1200000002 HC GENERAL ROOM WITH TELEMETRY DAILY

## 2024-09-19 PROCEDURE — 2500000002 HC RX 250 W HCPCS SELF ADMINISTERED DRUGS (ALT 637 FOR MEDICARE OP, ALT 636 FOR OP/ED): Performed by: STUDENT IN AN ORGANIZED HEALTH CARE EDUCATION/TRAINING PROGRAM

## 2024-09-19 PROCEDURE — 36415 COLL VENOUS BLD VENIPUNCTURE: CPT

## 2024-09-19 PROCEDURE — 97161 PT EVAL LOW COMPLEX 20 MIN: CPT | Mod: GP | Performed by: STUDENT IN AN ORGANIZED HEALTH CARE EDUCATION/TRAINING PROGRAM

## 2024-09-19 PROCEDURE — 2500000004 HC RX 250 GENERAL PHARMACY W/ HCPCS (ALT 636 FOR OP/ED)

## 2024-09-19 PROCEDURE — 99291 CRITICAL CARE FIRST HOUR: CPT

## 2024-09-19 PROCEDURE — 8010000001 HC DIALYSIS - HEMODIALYSIS PER DAY

## 2024-09-19 PROCEDURE — 83735 ASSAY OF MAGNESIUM: CPT

## 2024-09-19 PROCEDURE — 99233 SBSQ HOSP IP/OBS HIGH 50: CPT

## 2024-09-19 RX ORDER — TRAMADOL HYDROCHLORIDE 50 MG/1
50 TABLET ORAL ONCE
Status: COMPLETED | OUTPATIENT
Start: 2024-09-19 | End: 2024-09-19

## 2024-09-19 RX ORDER — HYDROMORPHONE HYDROCHLORIDE 1 MG/ML
0.2 INJECTION, SOLUTION INTRAMUSCULAR; INTRAVENOUS; SUBCUTANEOUS ONCE
Status: COMPLETED | OUTPATIENT
Start: 2024-09-19 | End: 2024-09-19

## 2024-09-19 RX ORDER — CARVEDILOL 12.5 MG/1
50 TABLET ORAL DAILY
Status: DISCONTINUED | OUTPATIENT
Start: 2024-09-19 | End: 2024-09-20

## 2024-09-19 RX ORDER — METHOCARBAMOL 500 MG/1
500 TABLET, FILM COATED ORAL ONCE
Status: COMPLETED | OUTPATIENT
Start: 2024-09-19 | End: 2024-09-19

## 2024-09-19 RX ORDER — METHOCARBAMOL 500 MG/1
500 TABLET, FILM COATED ORAL EVERY 6 HOURS PRN
Status: DISCONTINUED | OUTPATIENT
Start: 2024-09-19 | End: 2024-09-20 | Stop reason: HOSPADM

## 2024-09-19 RX ORDER — CLONIDINE HYDROCHLORIDE 0.1 MG/1
0.2 TABLET ORAL ONCE
Status: COMPLETED | OUTPATIENT
Start: 2024-09-19 | End: 2024-09-19

## 2024-09-19 SDOH — ECONOMIC STABILITY: FOOD INSECURITY: WITHIN THE PAST 12 MONTHS, THE FOOD YOU BOUGHT JUST DIDN'T LAST AND YOU DIDN'T HAVE MONEY TO GET MORE.: NEVER TRUE

## 2024-09-19 SDOH — ECONOMIC STABILITY: FOOD INSECURITY: WITHIN THE PAST 12 MONTHS, YOU WORRIED THAT YOUR FOOD WOULD RUN OUT BEFORE YOU GOT MONEY TO BUY MORE.: NEVER TRUE

## 2024-09-19 SDOH — ECONOMIC STABILITY: INCOME INSECURITY: IN THE LAST 12 MONTHS, WAS THERE A TIME WHEN YOU WERE NOT ABLE TO PAY THE MORTGAGE OR RENT ON TIME?: NO

## 2024-09-19 SDOH — ECONOMIC STABILITY: HOUSING INSECURITY: AT ANY TIME IN THE PAST 12 MONTHS, WERE YOU HOMELESS OR LIVING IN A SHELTER (INCLUDING NOW)?: NO

## 2024-09-19 SDOH — ECONOMIC STABILITY: INCOME INSECURITY: IN THE PAST 12 MONTHS, HAS THE ELECTRIC, GAS, OIL, OR WATER COMPANY THREATENED TO SHUT OFF SERVICE IN YOUR HOME?: NO

## 2024-09-19 SDOH — SOCIAL STABILITY: SOCIAL INSECURITY: WITHIN THE LAST YEAR, HAVE YOU BEEN HUMILIATED OR EMOTIONALLY ABUSED IN OTHER WAYS BY YOUR PARTNER OR EX-PARTNER?: NO

## 2024-09-19 SDOH — ECONOMIC STABILITY: HOUSING INSECURITY: IN THE PAST 12 MONTHS, HOW MANY TIMES HAVE YOU MOVED WHERE YOU WERE LIVING?: 0

## 2024-09-19 SDOH — SOCIAL STABILITY: SOCIAL INSECURITY: WITHIN THE LAST YEAR, HAVE YOU BEEN AFRAID OF YOUR PARTNER OR EX-PARTNER?: NO

## 2024-09-19 SDOH — ECONOMIC STABILITY: INCOME INSECURITY: HOW HARD IS IT FOR YOU TO PAY FOR THE VERY BASICS LIKE FOOD, HOUSING, MEDICAL CARE, AND HEATING?: NOT HARD AT ALL

## 2024-09-19 ASSESSMENT — PAIN SCALES - GENERAL
PAINLEVEL_OUTOF10: 8
PAINLEVEL_OUTOF10: 0 - NO PAIN
PAINLEVEL_OUTOF10: 6
PAINLEVEL_OUTOF10: 8
PAINLEVEL_OUTOF10: 8
PAINLEVEL_OUTOF10: 6

## 2024-09-19 ASSESSMENT — COGNITIVE AND FUNCTIONAL STATUS - GENERAL
MOVING TO AND FROM BED TO CHAIR: A LITTLE
WALKING IN HOSPITAL ROOM: A LITTLE
STANDING UP FROM CHAIR USING ARMS: A LITTLE
DAILY ACTIVITIY SCORE: 21
TURNING FROM BACK TO SIDE WHILE IN FLAT BAD: A LITTLE
TOILETING: A LITTLE
MOVING FROM LYING ON BACK TO SITTING ON SIDE OF FLAT BED WITH BEDRAILS: A LITTLE
HELP NEEDED FOR BATHING: A LITTLE
MOBILITY SCORE: 18
DRESSING REGULAR LOWER BODY CLOTHING: A LITTLE
CLIMB 3 TO 5 STEPS WITH RAILING: A LITTLE

## 2024-09-19 ASSESSMENT — PAIN DESCRIPTION - DESCRIPTORS
DESCRIPTORS: CRAMPING

## 2024-09-19 ASSESSMENT — PAIN - FUNCTIONAL ASSESSMENT
PAIN_FUNCTIONAL_ASSESSMENT: 0-10

## 2024-09-19 ASSESSMENT — ACTIVITIES OF DAILY LIVING (ADL)
ADL_ASSISTANCE: INDEPENDENT
LACK_OF_TRANSPORTATION: NO
BATHING_ASSISTANCE: STAND BY

## 2024-09-19 ASSESSMENT — PAIN DESCRIPTION - LOCATION: LOCATION: ABDOMEN

## 2024-09-19 ASSESSMENT — PAIN SCALES - WONG BAKER: WONGBAKER_NUMERICALRESPONSE: HURTS LITTLE MORE

## 2024-09-19 ASSESSMENT — PAIN DESCRIPTION - ORIENTATION: ORIENTATION: RIGHT;LEFT

## 2024-09-19 NOTE — SIGNIFICANT EVENT
Floor Readiness Note     I, personally, evaluated Stacey Heath prior to transfer to the floor, including reviewing all current laboratory and imaging studies. The patient remains appropriate for transfer to the floor. Bedside nurse and respiratory therapy are also in agreement of patient's readiness for the floor.     Brief summary:  Stacey Heath is a 54 y.o. female who was admitted to the MICU on 9/18 for hypertensive urgency/emergency. They have been treated with a Cardene drip with transition to oral antihypertensive medications. She received dialysis in the unit.     Updated focused Physical Exam:  GEN: Awake, alert, no acute distress  CV: Normal S1 and S2, systolic murmur present  PULM: CTA bilaterally  ABD: Soft, nondistended, patient endorsing some abdominal pain on the left    Current Vital Signs:  Heart Rate: 74 (09/20/24 0145 : Catalino Hanson, RN)  BP: 144/62 (09/20/24 0145 : Catalino Hanson, RN)  Temp: 36.1 °C (97 °F) (09/20/24 0104 : Virginia Hannah)  Resp: 20 (09/20/24 0145 : Catalino Hanson, PONCHO)  SpO2: 97 % (09/20/24 0145 : Catalino Hanson RN)    Relevant updates since rounds:  None    To follow-up:  Post-dialysis CBC as the patient was bleeding after dialysis  Abdominal pain  Hand cramping bilaterally    Hospitalist team aware of the patient.   Bedside nurse will now call accepting nurse for report and patient will be transferred to [place] [id number].    Stephanie Domínguez MD     Update:  The patient remains stable for transfer to the floor. CBC post-dialysis showed a hemoglobin of 10. Tramadol given for cramping, which helped the patient. Vitals refreshed and updated as above.

## 2024-09-19 NOTE — PROGRESS NOTES
Occupational Therapy    Evaluation    Patient Name: Stacey Heath  MRN: [id number]  Department: [facility] DIALYSIS  Room: 17/17-A  Today's Date: 9/19/2024  Time Calculation  Start Time: 1251  Stop Time: 1310  Time Calculation (min): 19 min    Assessment  IP OT Assessment  OT Assessment: Patient would benefit from OT services to address deficits in ADLs/IADLs and functional mobility.  Prognosis: Good  Barriers to Discharge: None  End of Session Communication: Bedside nurse  End of Session Patient Position: Bed, 3 rail up, Alarm off, not on at start of session  Plan:  Treatment Interventions: ADL retraining, Functional transfer training, Endurance training, Patient/family training, Equipment evaluation/education, Neuromuscular reeducation, Compensatory technique education  OT Frequency: 2 times per week  OT Discharge Recommendations: No OT needed after discharge  OT Recommended Transfer Status: Stand by assist  OT - OK to Discharge: Yes    Subjective   General:  General  Reason for Referral: HTN emergency, missed HD  Past Medical History Relevant to Rehab: ESRD 2/2 HTN and diabetes on HD T/TH/Sat, poorly controlled HTN, DM2, COPD  Family/Caregiver Present: No  Prior to Session Communication: Bedside nurse  Patient Position Received: Bed, 3 rail up, Alarm off, not on at start of session  General Comment: patient very pleasant and agreeable to OT  Precautions:  Hearing/Visual Limitations: hearing is WFL  Medical Precautions: Fall precautions       Pain:  Pain Assessment  Pain Assessment: 0-10  0-10 (Numeric) Pain Score:  (patient c/o (R) hand cramping, reports having cramping in hands/abdomen since dialysis yesterday, did not rate, RN provided medication for cramping at beginning of session and patient reports medication working)    Objective   Cognition:  Overall Cognitive Status: Within Functional Limits  Arousal/Alertness: Appropriate responses to stimuli  Orientation Level: Oriented X4  Following Commands: Follows all  commands and directions without difficulty        Hayes Agitation Sedation Scale  Hayes Agitation Sedation Scale (RASS): Alert and calm  Home Living:  Type of Home: House  Lives With:  (spouse and nephew)  Home Adaptive Equipment: Cane, Walker rolling or standard  Home Layout: Stairs to alternate level with rails  Home Access: Stairs to enter with rails  Entrance Stairs-Number of Steps: 5  Bathroom Shower/Tub: Tub/shower unit   Prior Function:  Level of Rhea: Independent with ADLs and functional transfers  Receives Help From: Family  ADL Assistance: Independent  Homemaking Assistance: Independent  Ambulatory Assistance: Independent  Leisure: enjoys time with her grandson  Hand Dominance: Left  Prior Function Comments: (-) drives     ADL:  Eating Assistance: Independent  Eating Deficit: Setup  Grooming Assistance: Independent  Grooming Deficit:  (in sitting)  Bathing Assistance: Stand by  Bathing Deficit:  (anticipated)  UE Dressing Assistance: Independent  UE Dressing Deficit:  (anticipated)  LE Dressing Assistance: Stand by  Toileting Assistance with Device: Stand by  Toileting Deficit:  (anticipated)  Activity Tolerance:  Early Mobility/Exercise Safety Screen: Proceed with mobilization - No exclusion criteria met  Bed Mobility/Transfers: Bed Mobility  Bed Mobility: Yes  Bed Mobility 1  Bed Mobility 1: Supine to sitting, Sitting to supine  Level of Assistance 1: Distant supervision  Bed Mobility Comments 1: HOB elevated    Transfers  Transfer: Yes  Transfer 1  Transfer From 1: Sit to, Stand to  Transfer to 1: Sit, Stand  Technique 1: Sit to stand, Stand to sit  Transfer Level of Assistance 1: Close supervision  Trials/Comments 1: functional mobility short household distances with no AD and CGA    Sitting Balance:  Static Sitting Balance  Static Sitting-Level of Assistance: Independent  Dynamic Sitting Balance  Dynamic Sitting-Level of Assistance: Distant supervision  Standing Balance:  Static Standing  Balance  Static Standing-Level of Assistance: Close supervision  Dynamic Standing Balance  Dynamic Standing-Level of Assistance: Contact guard     Vision: Vision - Basic Assessment  Current Vision: Wears glasses all the time  Sensation:  Light Touch: No apparent deficits  Strength:  Strength Comments: (B)UEs >/= 3/5     Extremities: RUE   RUE :  (AROM WFL) and LUE   LUE:  (AROM WFL)    Outcome Measures: [facility] Daily Activity  Putting on and taking off regular lower body clothing: A little  Bathing (including washing, rinsing, drying): A little  Putting on and taking off regular upper body clothing: None  Toileting, which includes using toilet, bedpan or urinal: A little  Taking care of personal grooming such as brushing teeth: None  Eating Meals: None  Daily Activity - Total Score: 21    , Confusion Assessment Method-ICU (CAM-ICU)  Feature 1: Acute Onset or Fluctuating Course: Negative  Feature 3: Altered Level of Consciousness: Negative  Overall CAM-ICU: Negative  , and E = Exercise and Early Mobility  Early Mobility/Exercise Safety Screen: Proceed with mobilization - No exclusion criteria met  ICU Mobility Scale: Walking with assistance of 1 person  Education Documentation  Body Mechanics, taught by Lorena Louis OT at 9/19/2024  3:17 PM.  Learner: Patient  Readiness: Acceptance  Method: Explanation  Response: Verbalizes Understanding, Needs Reinforcement    ADL Training, taught by Lorena Louis OT at 9/19/2024  3:17 PM.  Learner: Patient  Readiness: Acceptance  Method: Explanation  Response: Verbalizes Understanding, Needs Reinforcement    Education Comments  No comments found.    Goals:   Encounter Problems       Encounter Problems (Active)       ADLs       Patient with complete lower body dressing with independent level of assistance donning and doffing all LE clothes  with no adaptive equipment. (Progressing)       Start:  09/19/24    Expected End:  10/03/24            Patient will complete daily  grooming tasks with independent level of assistance and PRN adaptive equipment while standing. (Progressing)       Start:  09/19/24    Expected End:  10/03/24            Patient will complete toileting including hygiene clothing management/hygiene with independent level of assistance. (Progressing)       Start:  09/19/24    Expected End:  10/03/24            Patient will perform basic IADLs/simulated household tasks with independence. (Progressing)       Start:  09/19/24    Expected End:  10/03/24               BALANCE       Pt will maintain dynamic standing balance during ADL task with independent level of assistance in order to demonstrate decreased risk of falling and improved postural control. (Progressing)       Start:  09/19/24    Expected End:  10/03/24               EXERCISE/STRENGTHENING       Patient will participate in >15 minutes of activity with <2 rest breaks to increase tolerance for ADL/IADL performance. (Progressing)       Start:  09/19/24    Expected End:  10/03/24               MOBILITY       Patient will perform Functional mobility Household distances/Community Distances with independent level of assistance and least restrictive device in order to improve safety and functional mobility. (Progressing)       Start:  09/19/24    Expected End:  10/03/24               TRANSFERS       Patient will perform bed mobility independent level of assistance in order to improve safety and independence with mobility (Progressing)       Start:  09/19/24    Expected End:  10/03/24            Patient will complete functional transfers with least restrictive device with independent level of assistance. (Progressing)       Start:  09/19/24    Expected End:  10/03/24               Lorena Louis OTR/L  Inpatient Occupational Therapist   Rehab Office: [phone]

## 2024-09-19 NOTE — PROGRESS NOTES
SOCIAL WORK NOTE      09/19/24 1141   Discharge Planning   Living Arrangements Spouse/significant other   Support Systems Spouse/significant other   Assistance Needed independent   Type of Residence Private residence   Home or Post Acute Services None   Expected Discharge Disposition Home   Financial Resource Strain   How hard is it for you to pay for the very basics like food, housing, medical care, and heating? Not hard   Housing Stability   In the last 12 months, was there a time when you were not able to pay the mortgage or rent on time? N   In the past 12 months, how many times have you moved where you were living? 0   At any time in the past 12 months, were you homeless or living in a shelter (including now)? N   Transportation Needs   In the past 12 months, has lack of transportation kept you from medical appointments or from getting medications? no   In the past 12 months, has lack of transportation kept you from meetings, work, or from getting things needed for daily living? No     NOK: Children  DME: walker  Home Care: denied    [facility]: [facility]   PCP: AUTUMN Worthy, name unknown   Additional information: SW met with patient at bedside to complete assessment (please see flowsheets for further details). Patient normally lives at home with  (not actually legally ) and his nephews (14 and 21) they are in the process of getting custody of 13 YO. She has rides to HD, no issues attending visits. She did report concerns with food. Email sent to [facility]. Social work to follow.  MADISON Torres, LIILW-S ([id number])

## 2024-09-19 NOTE — SIGNIFICANT EVENT
Nephrology entry     Patient seen and staffed with attending today.   See consult note from 09/18  Plan for iHD again today for more volume removal     Jacob Floyd MD   Nephrology fellow   Nephrology pager [phone]  Available via Epic Chat

## 2024-09-19 NOTE — CARE PLAN
Problem: Pain - Adult  Goal: Verbalizes/displays adequate comfort level or baseline comfort level  Outcome: Progressing  Flowsheets (Taken 9/19/2024 6340)  Verbalizes/displays adequate comfort level or baseline comfort level:   Encourage patient to monitor pain and request assistance   Administer analgesics based on type and severity of pain and evaluate response   Consider cultural and social influences on pain and pain management   Assess pain using appropriate pain scale   Implement non-pharmacological measures as appropriate and evaluate response   Notify Licensed Independent Practitioner if interventions unsuccessful or patient reports new pain

## 2024-09-19 NOTE — PROGRESS NOTES
Physical Therapy    Physical Therapy Evaluation    Patient Name: Stacey Heath  MRN: [id number]  Room: 17/17  Today's Date: 9/19/2024   Time Calculation  Start Time: 0920  Stop Time: 0932  Time Calculation (min): 12 min    Assessment/Plan   PT Assessment  PT Assessment Results: Decreased endurance, Impaired balance, Decreased mobility  Rehab Prognosis: Good  Barriers to Discharge: medical acuity  End of Session Communication: Bedside nurse  End of Session Patient Position: Bed, 3 rail up, Alarm off, not on at start of session  IP OR SWING BED PT PLAN  Inpatient or Swing Bed: Inpatient  PT Plan  Treatment/Interventions: Bed mobility, Transfer training, Gait training, Stair training, Balance training, Strengthening, Endurance training, Range of motion, Therapeutic exercise, Therapeutic activity  PT Plan: Ongoing PT  PT Frequency: 3 times per week  PT Discharge Recommendations: No PT needed after discharge  PT Recommended Transfer Status: Contact guard  PT - OK to Discharge: Yes  Reason for Referral: HTN emergency, missed HD  Past Medical History Relevant to Rehab: ESRD 2/2 HTN and diabetes on HD T/TH/Satm poorly controlled HTN, DM2, COPD  Prior to Session Communication: Bedside nurse  Patient Position Received: Bed, 3 rail up, Alarm off, not on at start of session    Subjective      General Visit Information:  Subjective: Patient is alert, agreeable to PT.  Frequently apologizing when having LOB.    Reason for Referral: HTN emergency, missed HD  Past Medical History Relevant to Rehab: ESRD 2/2 HTN and diabetes on HD T/TH/Satm poorly controlled HTN, DM2, COPD  Prior to Session Communication: Bedside nurse  Patient Position Received: Bed, 3 rail up, Alarm off, not on at start of session  Family/Caregiver Present: No   Home Living:  Home Living  Type of Home: House  Lives With:  (spouse, nephew)  Home Adaptive Equipment: Cane, Walker rolling or standard  Home Layout: Stairs to alternate level with rails  Alternate Level  Stairs-Number of Steps: 12  Home Access: Stairs to enter with rails  Entrance Stairs-Rails: Both  Entrance Stairs-Number of Steps: 5  Prior Level of Function:  Prior Function Per Pt/Caregiver Report  Level of [place]: Independent with ADLs and functional transfers  Prior Function Comments: -drives  Precautions:  Precautions  Medical Precautions: Fall precautions  Vital Signs:  Pre Vitals  Vital Signs  Heart Rate: 74  SpO2: 93 %  BP: 155/70   Post Vitals  Vital Signs  Heart Rate: 75  SpO2: 93 %  BP: 161/67   Lines/Tubes/Drains:     Medical Gas Therapy: None (Room air)  Continuous Medications/Drips:       Objective   Pain:  Pain Assessment  0-10 (Numeric) Pain Score: 0 - No pain (post 0)    Cognition:  Cognition  Orientation Level: Oriented X4    General Assessments:  Extremity/Trunk Assessments:  Tone: No abnormalities noted  Sensation  Light Touch: No apparent deficits  Coordination  Movements are Fluid and Coordinated: Yes  Upper Extremity  ROM: WFL  Strength:WFL  Lower Extremity  ROM: WFL  Strength: WFL   Sitting Static Balance Normal  Sitting Dynamic Balance Not NormalUE Support Two UE support and Assist Level Supervision  Standing Static Balance Not NormalUE Support No UE support and Assist Level Contact Guard  Standing Dynamic Balance Not NormalUE Support No UE support and Assist Level Contact Guard    Functional Assessments:  Bed Mobility 1  Bed Mobility 1: Supine to sitting  Level of Assistance 1: Close supervision  Bed Mobility Comments 1: HOB 30    Transfers  Transfer: Yes  Transfer 1  Transfer From 1: Sit to  Transfer to 1: Stand  Transfer Level of Assistance 1: Close supervision  Transfers 2  Transfer From 2: Stand to  Transfer to 2: Sit  Transfer Level of Assistance 2: Close supervision  Transfers 3  Transfer From 3: Bed to  Transfer to 3: Bed  Transfer Level of Assistance 3: Close supervision    Ambulation/Gait Training  Ambulation/Gait Training Performed: Yes  Ambulation/Gait Training 1  Surface  1: Level tile  Device 1: No device  Assistance 1: Contact guard  Quality of Gait 1:  (path deviation, alternating step length, mild postural sway)  Comments/Distance (ft) 1: 40'              Outcome Measures:  [facility] Basic Mobility  Turning from your back to your side while in a flat bed without using bedrails: A little  Moving from lying on your back to sitting on the side of a flat bed without using bedrails: A little  Moving to and from bed to chair (including a wheelchair): A little  Standing up from a chair using your arms (e.g. wheelchair or bedside chair): A little  To walk in hospital room: A little  Climbing 3-5 steps with railing: A little  Basic Mobility - Total Score: 18           FSS-ICU  Ambulation: Walks <50 feet with any assistance x1 or walks any distance with assistance x2 people  Rolling: Supervision or set-up only  Sitting: Supervision or set-up only  Transfer Sit-to-Stand: Supervision or set-up only  Transfer Supine-to-Sit: Supervision or set-up only  Total Score: 21  ICU Mobility Screen  ICU Mobility Scale: Walking with assistance of 2 or more people  Tinetti  Sitting Balance: Leans or slides in chair  Arises: Able, uses arms to help  Attempts to Arise: Able, requires more than one attempt  Immediate Standing Balance (First 5 Seconds): Unsteady (Swaggers, moves feet, trunk sway)  Standing Balance: Unsteady  Nudged: Begins to fall  Eyes Closed: Unsteady  Turned 360 Degrees: Steadiness: Steady  Turned 360 Degrees: Continuity of Steps: Continuous  Sitting Down: Uses arms or not a smooth motion  Balance Score: 5  Initiation of Gait: Any hesitancy or multiple attempts to start  Step Height: R Swing Foot: Right foot complete clears floor  Step Length: R Swing Foot: Does not pass left stance foot with step  Step Height: L Swing Foot: Left foot complete clears floor  Step Length: L Swing Foot: Does not pass right stance foot with step  Step Symmetry: Right and left step length not equal (Estimate)  Step  Continuity: Stopping or discontinuity between steps  Path: Marked deviation  Trunk: Marked sway or uses walking aid  Walking Time: Heels apart  Gait Score: 2  Total Score: 7          Encounter Problems       Encounter Problems (Active)       PT Problem       Patient will complete supine to sit and sit to supine Independent  (Progressing)       Start:  09/19/24    Expected End:  10/03/24            Patient will perform sit<>stand transfer with LRAD, and Independent  (Progressing)       Start:  09/19/24    Expected End:  10/03/24            Patient will ambulate >150' with LRAD and Modified Independent  (Progressing)       Start:  09/19/24    Expected End:  10/03/24            Patient will complete Tinetti Balance Assesment with a score equal to or better than 24 to reduce falls risk.    (Progressing)       Start:  09/19/24    Expected End:  10/03/24               Pain - Adult            Assessment: Patient presents 2/2 HTN emergency c missed HD.  Currently CGA for mobility and high falls risk based on gait and balance testing.  Patient would benefit from further therapy to increase functional independence and safety.  Will continue to follow during acute stay.      Education Documentation  Precautions, taught by Aditya Zee PT at 9/19/2024 10:42 AM.  Learner: Patient  Readiness: Acceptance  Method: Explanation  Response: Needs Reinforcement  Comment: fall precautions    Body Mechanics, taught by Aditya Zee PT at 9/19/2024 10:42 AM.  Learner: Patient  Readiness: Acceptance  Method: Explanation  Response: Needs Reinforcement  Comment: fall precautions    Mobility Training, taught by Aditya Zee PT at 9/19/2024 10:42 AM.  Learner: Patient  Readiness: Acceptance  Method: Explanation  Response: Needs Reinforcement  Comment: fall precautions    Education Comments  No comments found.          09/19/24 at 10:43 AM   Aditya Zee PT   Rehab Office: [phone]

## 2024-09-19 NOTE — CARE PLAN
The patient's goals for the shift include eat lunch    The clinical goals for the shift includes: Remain  off nicardipine gtt throughout shift.         Problem: Pain - Adult  Goal: Verbalizes/displays adequate comfort level or baseline comfort level  Outcome: Progressing  Flowsheets (Taken 9/19/2024 0103)  Verbalizes/displays adequate comfort level or baseline comfort level:   Encourage patient to monitor pain and request assistance   Assess pain using appropriate pain scale   Administer analgesics based on type and severity of pain and evaluate response   Implement non-pharmacological measures as appropriate and evaluate response   Consider cultural and social influences on pain and pain management   Notify Licensed Independent Practitioner if interventions unsuccessful or patient reports new pain     Problem: Discharge Planning  Goal: Discharge to home or other facility with appropriate resources  Outcome: Progressing     Problem: Chronic Conditions and Co-morbidities  Goal: Patient's chronic conditions and co-morbidity symptoms are monitored and maintained or improved  Outcome: Progressing     Problem: Fall/Injury  Goal: Use assistive devices by end of the shift  Outcome: Progressing     Problem: Fall/Injury  Goal: Pace activities to prevent fatigue by end of the shift  Outcome: Progressing

## 2024-09-19 NOTE — PROGRESS NOTES
ICU to [facility] Transfer Summary     I:  ICU Admission Reason & Brief ICU Course:    Stacey Heath is a 54 y.o. 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 Patient was transferred to the ICU for hypertensive emergency with SBP>200.Patient has history of refractory hypertension on multiple antihypertensives including: carvedilol, clonidine, hydralazine, isosorbitde mononitrate, valsartan, and Nifedipine ER. Patient is able to urinate and takes toremide on non-dialysis days as well. When patient initially presented to the ED, she endorsed chest pain, ,palpitations and headache, however on current examination the patient states that chest pain has been resolved and currently only endorses migraine headache.  Troponins elevated, but downtrending. BNP>2500    Patient arrived to the MICU with a SBP greater than 200. The patient was placed on titratable nicardipine drip when she arrived. Her home medication was reviewed and she was restarted on her home antihypertensives as well. Furthermore, the patient received Tylenol PRN and one dose of her home Sumatriptan for her migraine headache. On imaging, a head Ct indicated no acute intracranial process and CXR showed a small left pleural effusion. Her BP stabilized on the drip and the Nephrology and Renal Care team were consulted. Patient was scheduled for additional dialysis on 9/18/2024. During her dialysis session, her systolic blood pressure decreased to 88 per nursing, and so her BP medications were held, including the Nicardipine drip. Patient began to complain of generalized pruritus and cramping bilateral hand pain during her dialysis session. She received 1 dose of benadryl which helped alleviate  her pruritus and  Dilaudid for her cramping pain after Tylenol did not alleviate her symptoms. Overnight, patient desaturated mildly and required 1 L of NC. However, she did not require additional oxygen supplementation the morning of transfer.  C: Code Status/DPOA Info/Goals of Care/ACP Note    Full Code  Contact Number: [phone] Diya Jang (Child)     U: Unprescribing & Pertinent High-Risk Medications    Changes to home meds: Changed Carvedilol to daily during her MICU stay, otherwise resumed remaining antihypertensives.     Anticoagulation: Yes - Therapeutic Apixaban    Antibiotics:   [x] N/A - no current planned antimicrobioals      P: Pending Tests at the Time of Transfer   Serum Metanephrines       A: Active consultants, including Rehab:   Nephrology and Renal Care Consulted   [x]  PT  [x]  OT  []  SLP  []  Wound Care    U: Uncertainty Measure/Diagnostic Pause:    Working diagnosis at the time of transfer Primary Hypertensive Urgency SBP>200 though ddx includes Secondary HTN     Diagnosis Degree of Certainty: 1. High degree of certainty about the clinical diagnosis.     S: Summary of Major Problems and To-Dos:   #Hypertensive Emergency  #ESRD on Dialysis    To-do list prior to transfer:  []Nephrology consulted,repeat dialysis session today. Restarted home antihypertensives, nephrology agreed.   []Muscle relaxant if patient continues to endorse cramping pain  []CTM Blood Pressure,      E: Exam, including Lines/Drains/Airways & Data Review:   Constitutional: Pleasant, Awake/Alert/Oriented to person place and time. No distress  Head: Atraumatic, Normocephalic  Eyes: EOMI. JACOB  Neck: Enlarged neck circumference, No JVD  Cardiovascular: Regular rate and rhythm, S1, S2.S ystolic Murmur on right sternal border  Respiratory:  Moderate Rhonchi on right lung. Good chest wall expansion  Abdomen: Soft, Nontender, Obese. Bowel sounds appreciated  Musculoskeletal: ROM intact. Muscle strength grossly intact upper  and lower extremities 5/5.   Neurological: CNII-XII intact. Sensation grossly intact  Extremities: Warm and dry. No acute rashes and lesions  Psychiatric: Appropriate mood and affect   Difficult airway? No  Lines/drains assessed for removal? No    Within 30 minutes of the patient physically leaving the floor, a Floor Readiness Note needs to be placed with updated vitals.

## 2024-09-20 ENCOUNTER — PHARMACY VISIT (OUTPATIENT)
Dept: PHARMACY | Facility: CLINIC | Age: 54
End: 2024-09-20
Payer: COMMERCIAL

## 2024-09-20 VITALS
WEIGHT: 115.08 LBS | SYSTOLIC BLOOD PRESSURE: 160 MMHG | TEMPERATURE: 97.3 F | BODY MASS INDEX: 26.63 KG/M2 | RESPIRATION RATE: 18 BRPM | DIASTOLIC BLOOD PRESSURE: 72 MMHG | HEIGHT: 55 IN | OXYGEN SATURATION: 98 % | HEART RATE: 77 BPM

## 2024-09-20 PROBLEM — R06.02 SHORTNESS OF BREATH: Status: RESOLVED | Noted: 2024-04-03 | Resolved: 2024-09-20

## 2024-09-20 PROBLEM — R07.9 CHEST PAIN: Status: RESOLVED | Noted: 2024-09-17 | Resolved: 2024-09-20

## 2024-09-20 PROBLEM — I16.0 HYPERTENSIVE URGENCY: Status: RESOLVED | Noted: 2024-09-17 | Resolved: 2024-09-20

## 2024-09-20 LAB
ALBUMIN SERPL BCP-MCNC: 4.3 G/DL (ref 3.4–5)
ANION GAP SERPL CALC-SCNC: 17 MMOL/L (ref 10–20)
BASOPHILS # BLD AUTO: 0.07 X10*3/UL (ref 0–0.1)
BASOPHILS NFR BLD AUTO: 1.7 %
BUN SERPL-MCNC: 25 MG/DL (ref 6–23)
CALCIUM SERPL-MCNC: 10.1 MG/DL (ref 8.6–10.6)
CHLORIDE SERPL-SCNC: 95 MMOL/L (ref 98–107)
CO2 SERPL-SCNC: 25 MMOL/L (ref 21–32)
CREAT SERPL-MCNC: 5.36 MG/DL (ref 0.5–1.05)
EGFRCR SERPLBLD CKD-EPI 2021: 9 ML/MIN/1.73M*2
EOSINOPHIL # BLD AUTO: 0.54 X10*3/UL (ref 0–0.7)
EOSINOPHIL NFR BLD AUTO: 12.8 %
ERYTHROCYTE [DISTWIDTH] IN BLOOD BY AUTOMATED COUNT: 17.4 % (ref 11.5–14.5)
GLUCOSE SERPL-MCNC: 108 MG/DL (ref 74–99)
HCT VFR BLD AUTO: 37.2 % (ref 36–46)
HGB BLD-MCNC: 11.2 G/DL (ref 12–16)
IMM GRANULOCYTES # BLD AUTO: 0.02 X10*3/UL (ref 0–0.7)
IMM GRANULOCYTES NFR BLD AUTO: 0.5 % (ref 0–0.9)
LYMPHOCYTES # BLD AUTO: 0.92 X10*3/UL (ref 1.2–4.8)
LYMPHOCYTES NFR BLD AUTO: 21.8 %
MAGNESIUM SERPL-MCNC: 2.45 MG/DL (ref 1.6–2.4)
MCH RBC QN AUTO: 28.7 PG (ref 26–34)
MCHC RBC AUTO-ENTMCNC: 30.1 G/DL (ref 32–36)
MCV RBC AUTO: 95 FL (ref 80–100)
MONOCYTES # BLD AUTO: 0.48 X10*3/UL (ref 0.1–1)
MONOCYTES NFR BLD AUTO: 11.4 %
NEUTROPHILS # BLD AUTO: 2.19 X10*3/UL (ref 1.2–7.7)
NEUTROPHILS NFR BLD AUTO: 51.8 %
NRBC BLD-RTO: 0 /100 WBCS (ref 0–0)
PHOSPHATE SERPL-MCNC: 4.7 MG/DL (ref 2.5–4.9)
PLATELET # BLD AUTO: 255 X10*3/UL (ref 150–450)
POTASSIUM SERPL-SCNC: 4.2 MMOL/L (ref 3.5–5.3)
RBC # BLD AUTO: 3.9 X10*6/UL (ref 4–5.2)
SODIUM SERPL-SCNC: 133 MMOL/L (ref 136–145)
WBC # BLD AUTO: 4.2 X10*3/UL (ref 4.4–11.3)

## 2024-09-20 PROCEDURE — 2500000001 HC RX 250 WO HCPCS SELF ADMINISTERED DRUGS (ALT 637 FOR MEDICARE OP): Performed by: STUDENT IN AN ORGANIZED HEALTH CARE EDUCATION/TRAINING PROGRAM

## 2024-09-20 PROCEDURE — 99239 HOSP IP/OBS DSCHRG MGMT >30: CPT | Performed by: STUDENT IN AN ORGANIZED HEALTH CARE EDUCATION/TRAINING PROGRAM

## 2024-09-20 PROCEDURE — 36415 COLL VENOUS BLD VENIPUNCTURE: CPT | Performed by: STUDENT IN AN ORGANIZED HEALTH CARE EDUCATION/TRAINING PROGRAM

## 2024-09-20 PROCEDURE — 80069 RENAL FUNCTION PANEL: CPT | Performed by: STUDENT IN AN ORGANIZED HEALTH CARE EDUCATION/TRAINING PROGRAM

## 2024-09-20 PROCEDURE — 2500000004 HC RX 250 GENERAL PHARMACY W/ HCPCS (ALT 636 FOR OP/ED): Performed by: STUDENT IN AN ORGANIZED HEALTH CARE EDUCATION/TRAINING PROGRAM

## 2024-09-20 PROCEDURE — 85025 COMPLETE CBC W/AUTO DIFF WBC: CPT | Performed by: STUDENT IN AN ORGANIZED HEALTH CARE EDUCATION/TRAINING PROGRAM

## 2024-09-20 PROCEDURE — 99233 SBSQ HOSP IP/OBS HIGH 50: CPT | Performed by: NURSE PRACTITIONER

## 2024-09-20 PROCEDURE — 83735 ASSAY OF MAGNESIUM: CPT | Performed by: STUDENT IN AN ORGANIZED HEALTH CARE EDUCATION/TRAINING PROGRAM

## 2024-09-20 PROCEDURE — 2500000002 HC RX 250 W HCPCS SELF ADMINISTERED DRUGS (ALT 637 FOR MEDICARE OP, ALT 636 FOR OP/ED): Performed by: STUDENT IN AN ORGANIZED HEALTH CARE EDUCATION/TRAINING PROGRAM

## 2024-09-20 PROCEDURE — 6350000001 HC RX 635 EPOETIN >10,000 UNITS: Mod: JZ | Performed by: STUDENT IN AN ORGANIZED HEALTH CARE EDUCATION/TRAINING PROGRAM

## 2024-09-20 PROCEDURE — RXMED WILLOW AMBULATORY MEDICATION CHARGE

## 2024-09-20 RX ORDER — ACETAMINOPHEN 500 MG
5 TABLET ORAL NIGHTLY
Qty: 30 TABLET | Refills: 1 | Status: SHIPPED | OUTPATIENT
Start: 2024-09-20

## 2024-09-20 RX ORDER — SUMATRIPTAN SUCCINATE 25 MG/1
25 TABLET ORAL ONCE AS NEEDED
Qty: 9 TABLET | Refills: 2 | Status: SHIPPED | OUTPATIENT
Start: 2024-09-20 | End: 2024-11-19

## 2024-09-20 RX ORDER — GABAPENTIN 300 MG/1
300 CAPSULE ORAL NIGHTLY
Qty: 30 CAPSULE | Refills: 1 | Status: SHIPPED | OUTPATIENT
Start: 2024-09-20 | End: 2024-11-19

## 2024-09-20 RX ORDER — NIFEDIPINE 90 MG/1
90 TABLET, EXTENDED RELEASE ORAL
Status: DISCONTINUED | OUTPATIENT
Start: 2024-09-20 | End: 2024-09-20 | Stop reason: HOSPADM

## 2024-09-20 RX ORDER — CARVEDILOL 12.5 MG/1
50 TABLET ORAL DAILY
Status: DISCONTINUED | OUTPATIENT
Start: 2024-09-20 | End: 2024-09-20 | Stop reason: HOSPADM

## 2024-09-20 RX ORDER — VALSARTAN 320 MG/1
320 TABLET ORAL DAILY
Status: DISCONTINUED | OUTPATIENT
Start: 2024-09-20 | End: 2024-09-20 | Stop reason: HOSPADM

## 2024-09-20 RX ORDER — CARVEDILOL 25 MG/1
50 TABLET ORAL 2 TIMES DAILY
Qty: 120 TABLET | Refills: 2 | Status: SHIPPED | OUTPATIENT
Start: 2024-09-20 | End: 2024-09-25

## 2024-09-20 RX ORDER — ASPIRIN 81 MG/1
81 TABLET ORAL DAILY
Qty: 30 TABLET | Refills: 2 | Status: SHIPPED | OUTPATIENT
Start: 2024-09-20 | End: 2024-09-25

## 2024-09-20 RX ORDER — ISOSORBIDE MONONITRATE 120 MG/1
120 TABLET, EXTENDED RELEASE ORAL DAILY
Qty: 30 TABLET | Refills: 2 | Status: SHIPPED | OUTPATIENT
Start: 2024-09-20 | End: 2024-09-25

## 2024-09-20 RX ORDER — ALBUTEROL SULFATE 1.25 MG/3ML
1.25 SOLUTION RESPIRATORY (INHALATION) EVERY 6 HOURS PRN
Qty: 90 ML | Refills: 11 | Status: SHIPPED | OUTPATIENT
Start: 2024-09-20 | End: 2024-09-25

## 2024-09-20 RX ORDER — HYDRALAZINE HYDROCHLORIDE 100 MG/1
100 TABLET, FILM COATED ORAL 3 TIMES DAILY
Qty: 90 TABLET | Refills: 2 | Status: SHIPPED | OUTPATIENT
Start: 2024-09-20 | End: 2024-09-25

## 2024-09-20 RX ORDER — HYDROXYZINE HYDROCHLORIDE 25 MG/1
25 TABLET, FILM COATED ORAL EVERY 6 HOURS PRN
Qty: 30 TABLET | Refills: 0 | Status: SHIPPED | OUTPATIENT
Start: 2024-09-20 | End: 2024-09-25

## 2024-09-20 RX ORDER — CLONIDINE HYDROCHLORIDE 0.2 MG/1
0.2 TABLET ORAL 3 TIMES DAILY
Qty: 90 TABLET | Refills: 2 | Status: SHIPPED | OUTPATIENT
Start: 2024-09-20 | End: 2024-09-25

## 2024-09-20 RX ORDER — CLONIDINE HYDROCHLORIDE 0.1 MG/1
0.2 TABLET ORAL EVERY 8 HOURS SCHEDULED
Status: DISCONTINUED | OUTPATIENT
Start: 2024-09-20 | End: 2024-09-20 | Stop reason: HOSPADM

## 2024-09-20 RX ORDER — CALCIUM ACETATE 667 MG/1
1334 CAPSULE ORAL
Qty: 180 CAPSULE | Refills: 11 | Status: SHIPPED | OUTPATIENT
Start: 2024-09-20 | End: 2024-09-25

## 2024-09-20 RX ORDER — FLUTICASONE FUROATE AND VILANTEROL 100; 25 UG/1; UG/1
1 POWDER RESPIRATORY (INHALATION)
Qty: 60 EACH | Refills: 11 | Status: SHIPPED | OUTPATIENT
Start: 2024-09-20 | End: 2025-09-20

## 2024-09-20 RX ORDER — HYDRALAZINE HYDROCHLORIDE 50 MG/1
100 TABLET, FILM COATED ORAL 3 TIMES DAILY
Status: DISCONTINUED | OUTPATIENT
Start: 2024-09-20 | End: 2024-09-20 | Stop reason: HOSPADM

## 2024-09-20 RX ORDER — PANTOPRAZOLE SODIUM 40 MG/1
40 TABLET, DELAYED RELEASE ORAL
Qty: 30 TABLET | Refills: 1 | Status: SHIPPED | OUTPATIENT
Start: 2024-09-20 | End: 2024-09-25

## 2024-09-20 RX ORDER — TORSEMIDE 20 MG/1
TABLET ORAL
Qty: 60 TABLET | Refills: 2 | Status: SHIPPED | OUTPATIENT
Start: 2024-09-20 | End: 2024-09-25

## 2024-09-20 RX ORDER — VALSARTAN 320 MG/1
320 TABLET ORAL DAILY
Qty: 30 TABLET | Refills: 2 | Status: SHIPPED | OUTPATIENT
Start: 2024-09-20 | End: 2024-09-25

## 2024-09-20 RX ORDER — ATORVASTATIN CALCIUM 80 MG/1
80 TABLET, FILM COATED ORAL NIGHTLY
Qty: 30 TABLET | Refills: 11 | Status: SHIPPED | OUTPATIENT
Start: 2024-09-20 | End: 2024-09-25

## 2024-09-20 RX ORDER — NIFEDIPINE 90 MG/1
90 TABLET, EXTENDED RELEASE ORAL
Qty: 30 TABLET | Refills: 2 | Status: SHIPPED | OUTPATIENT
Start: 2024-09-20 | End: 2024-09-25

## 2024-09-20 RX ORDER — ALBUTEROL SULFATE 90 UG/1
2 INHALANT RESPIRATORY (INHALATION) EVERY 4 HOURS PRN
Qty: 18 G | Refills: 5 | Status: SHIPPED | OUTPATIENT
Start: 2024-09-20 | End: 2024-09-25

## 2024-09-20 ASSESSMENT — COGNITIVE AND FUNCTIONAL STATUS - GENERAL
MOBILITY SCORE: 24
DAILY ACTIVITIY SCORE: 24

## 2024-09-20 ASSESSMENT — PAIN SCALES - GENERAL: PAINLEVEL_OUTOF10: 0 - NO PAIN

## 2024-09-20 NOTE — CARE PLAN
The patient's goals for the shift include eat lunch    The clinical goals for the shift include Pt will remain safe during shift    Over the shift, the patient did not make progress toward the following goals. Barriers to progression include   Problem: Fall/Injury  Goal: Pace activities to prevent fatigue by end of the shift  Outcome: Progressing   . Recommendations to address these barriers include   Problem: Skin  Goal: Promote/optimize nutrition  Outcome: Progressing   .

## 2024-09-20 NOTE — NURSING NOTE
AVS reviewed with pt, no questions or concerns at this time. IV removed by bedside RN, catheter intact. Pt has all belongings and meds to beds packed and ready to go. She states her ride is almost here to get her.

## 2024-09-20 NOTE — CARE PLAN
Problem: Skin  Goal: Prevent/manage excess moisture  Outcome: Progressing  Flowsheets (Taken 9/19/2024 2203)  Prevent/manage excess moisture:   Monitor for/manage infection if present   Cleanse incontinence/protect with barrier cream  Goal: Prevent/minimize sheer/friction injuries  Outcome: Progressing  Flowsheets (Taken 9/19/2024 2203)  Prevent/minimize sheer/friction injuries:   Use pull sheet   Complete micro-shifts as needed if patient unable. Adjust patient position to relieve pressure points, not a full turn   Increase activity/out of bed for meals  Goal: Promote skin healing  Outcome: Progressing  Flowsheets (Taken 9/19/2024 2203)  Promote skin healing:   Assess skin/pad under line(s)/device(s)   Protective dressings over bony prominences

## 2024-09-20 NOTE — PROGRESS NOTES
"Stacey Heath is a 54 y.o. female on day 3 of admission presenting with Shortness of breath.    Subjective   Pt resting in bed. Denies sob (improved), n/v/d, fever, cough, chills, pain, chest pains, heart flutters, light head, dizziness, constipation        Objective     Physical Exam  Vitals and nursing note reviewed.   Cardiovascular:      Rate and Rhythm: Normal rate and regular rhythm.      Comments: TELE-73  Pulmonary:      Comments: Mckinley lung sounds slight dim  Abdominal:      General: There is distension.      Comments: Non-tender to palpation   Genitourinary:     Comments: States make urine  Musculoskeletal:      Comments: Ble without edema   Skin:     General: Skin is warm and dry.   Neurological:      Mental Status: She is alert and oriented to person, place, and time.   Psychiatric:         Mood and Affect: Mood normal.         Behavior: Behavior normal.         Last Recorded Vitals  Blood pressure 141/68, pulse 79, temperature 36.2 °C (97.2 °F), resp. rate 18, height 1.397 m (4' 7\"), weight 52.2 kg (115 lb 1.3 oz), SpO2 99%.  Intake/Output last 3 Shifts:  I/O last 3 completed shifts:  In: 3420 (65.5 mL/kg) [P.O.:1420; I.V.:800 (15.3 mL/kg); Other:1200]  Out: 4363 (83.6 mL/kg) [Other:4363]  Weight: 52.2 kg     Relevant Results            Scheduled medications  apixaban, 5 mg, oral, BID  aspirin, 81 mg, oral, Daily  atorvastatin, 80 mg, oral, Nightly  calcium acetate, 1,334 mg, oral, TID  carvedilol, 50 mg, oral, Daily  cloNIDine, 0.2 mg, oral, q8h BELEN  epoetin joceline, 10,000 Units, subcutaneous, Once per day on Monday Wednesday Friday  fluticasone, 2 spray, Each Nostril, Daily  fluticasone furoate-vilanteroL, 1 puff, inhalation, Daily  gabapentin, 300 mg, oral, Nightly  hydrALAZINE, 100 mg, oral, TID  isosorbide mononitrate ER, 120 mg, oral, Daily  NIFEdipine ER, 90 mg, oral, Daily before breakfast  pantoprazole, 40 mg, oral, Daily before breakfast  polyethylene glycol, 17 g, oral, Daily  valsartan, 320 mg, " oral, Daily      Continuous medications     PRN medications  PRN medications: acetaminophen **OR** acetaminophen **OR** acetaminophen, albuterol, hydrOXYzine HCL, melatonin, methocarbamol   Results for orders placed or performed during the hospital encounter of 09/17/24 (from the past 24 hour(s))   CBC and Auto Differential   Result Value Ref Range    WBC 3.4 (L) 4.4 - 11.3 x10*3/uL    nRBC 0.0 0.0 - 0.0 /100 WBCs    RBC 3.40 (L) 4.00 - 5.20 x10*6/uL    Hemoglobin 10.0 (L) 12.0 - 16.0 g/dL    Hematocrit 32.5 (L) 36.0 - 46.0 %    MCV 96 80 - 100 fL    MCH 29.4 26.0 - 34.0 pg    MCHC 30.8 (L) 32.0 - 36.0 g/dL    RDW 17.3 (H) 11.5 - 14.5 %    Platelets 302 150 - 450 x10*3/uL    Neutrophils % 54.3 40.0 - 80.0 %    Immature Granulocytes %, Automated 0.6 0.0 - 0.9 %    Lymphocytes % 20.6 13.0 - 44.0 %    Monocytes % 11.5 2.0 - 10.0 %    Eosinophils % 11.2 0.0 - 6.0 %    Basophils % 1.8 0.0 - 2.0 %    Neutrophils Absolute 1.84 1.20 - 7.70 x10*3/uL    Immature Granulocytes Absolute, Automated 0.02 0.00 - 0.70 x10*3/uL    Lymphocytes Absolute 0.70 (L) 1.20 - 4.80 x10*3/uL    Monocytes Absolute 0.39 0.10 - 1.00 x10*3/uL    Eosinophils Absolute 0.38 0.00 - 0.70 x10*3/uL    Basophils Absolute 0.06 0.00 - 0.10 x10*3/uL   CBC and Auto Differential   Result Value Ref Range    WBC 4.2 (L) 4.4 - 11.3 x10*3/uL    nRBC 0.0 0.0 - 0.0 /100 WBCs    RBC 3.90 (L) 4.00 - 5.20 x10*6/uL    Hemoglobin 11.2 (L) 12.0 - 16.0 g/dL    Hematocrit 37.2 36.0 - 46.0 %    MCV 95 80 - 100 fL    MCH 28.7 26.0 - 34.0 pg    MCHC 30.1 (L) 32.0 - 36.0 g/dL    RDW 17.4 (H) 11.5 - 14.5 %    Platelets 255 150 - 450 x10*3/uL    Neutrophils % 51.8 40.0 - 80.0 %    Immature Granulocytes %, Automated 0.5 0.0 - 0.9 %    Lymphocytes % 21.8 13.0 - 44.0 %    Monocytes % 11.4 2.0 - 10.0 %    Eosinophils % 12.8 0.0 - 6.0 %    Basophils % 1.7 0.0 - 2.0 %    Neutrophils Absolute 2.19 1.20 - 7.70 x10*3/uL    Immature Granulocytes Absolute, Automated 0.02 0.00 - 0.70 x10*3/uL     Lymphocytes Absolute 0.92 (L) 1.20 - 4.80 x10*3/uL    Monocytes Absolute 0.48 0.10 - 1.00 x10*3/uL    Eosinophils Absolute 0.54 0.00 - 0.70 x10*3/uL    Basophils Absolute 0.07 0.00 - 0.10 x10*3/uL   Magnesium   Result Value Ref Range    Magnesium 2.45 (H) 1.60 - 2.40 mg/dL   Renal function panel   Result Value Ref Range    Glucose 108 (H) 74 - 99 mg/dL    Sodium 133 (L) 136 - 145 mmol/L    Potassium 4.2 3.5 - 5.3 mmol/L    Chloride 95 (L) 98 - 107 mmol/L    Bicarbonate 25 21 - 32 mmol/L    Anion Gap 17 10 - 20 mmol/L    Urea Nitrogen 25 (H) 6 - 23 mg/dL    Creatinine 5.36 (H) 0.50 - 1.05 mg/dL    eGFR 9 (L) >60 mL/min/1.73m*2    Calcium 10.1 8.6 - 10.6 mg/dL    Phosphorus 4.7 2.5 - 4.9 mg/dL    Albumin 4.3 3.4 - 5.0 g/dL             Assessment/Plan   Assessment & Plan  Congestive heart failure (Multi)    did not tolerate hemodialysis yesterday with net fluid loss 800cc. Bp between (91//74), pt with cramping, uf off. Treatment on 9/18 bp between (139//109), uf off during tx for c/o cramping Bp unstable with tx    euvolemic on exam and has stable electrolytes . K+=4.2     Outpatient Dialysis schedule:  TTS [facility]/Dr Barraza.       Access: rt avf with +thrill/bruit- no issues - able to achieve      Anemia of ESRD:  epoetin joceline (Epogen,Procrit) injection 10,000 Units on dialysis days..current hgb 11.2... will cont to monitor      CKD-MBD Phosphate Binder:calcium acetate (Phoslo) capsule 1,334 mg tid ac     Plan HD tomorrow with UF as tolerated     Renal diet      Please obtain daily standing wt (if possible)     Medication to be adjusted for ESRD      Patient to continue regular HD schedule while inpatient and to follow with the outpatient nephrologist at discharge          LILLY Rebollar-CNP

## 2024-09-20 NOTE — DISCHARGE INSTRUCTIONS
Dear Ms. Stacey Heath,    You came to the hospital due to chest pain and shortness of breath. We found that you had a very high blood pressure and this caused all your symptoms. We gave you your blood pressure medications and did dialysis which improved your blood pressure.     Please make an appointment with Vascular Medicine to talk about how long you need to be on Eliquis (apixaban).    Please continue to take all your medications as prescribed and follow-up with your primary doctor in the next 2 weeks.    Please call your doctor or return to the hospital if you develop new or concerning symptoms.    Thank you,  Your  HealthCare Team

## 2024-09-20 NOTE — PROGRESS NOTES
Transitional Care Coordination Progress Note:  Patient discussed during interdisciplinary rounds.  Team members present: AGUSTIN TANG  Plan per Medical/Surgical team: Pt presented to ED  for chest pain, shortness of breath, and headache that occurred after she missed her dialysis session 9/17 (last dialysis session Saturday). Per attending pt is medically ready for discharge, no home going needs anticipated.  Payer: [employer]  Status: Inpatient  Discharge disposition: Home  Potential Barriers: none  ADOD: 9/20  Met with pt at bedside to review IMM letter. Pt stated she is aware of discharge home today, she has a ride coming to pick her up later this afternoon. Pt voiced no additional home going needs at time of discharge. Signed copy of IMM letter placed in her chart. Care coordinator will continue to follow for discharge planning needs.     Dorothy Ramos RN  Transitional Care Coordinator (TCC)  [phone] or [phone]

## 2024-09-20 NOTE — DISCHARGE SUMMARY
Discharge Diagnosis  Hypertensive urgency  End stage renal disease on hemodialysis  Type 2 diabetes mellitus  HFpEF  Brachial DVT (4/14/24)     Issues Requiring Follow-Up  [ ] Please follow-up serum metanephrines.  [ ] Patient would like to discuss transition to peritoneal dialysis and if this would be better for her blood pressure and lifestyle.  [ ] Consider referral to vascular medicine to discuss duration of anticoagulation for the brachial DVT (4/14/24) given patient's high bleeding risk.    Test Results Pending At Discharge  Pending Labs       Order Current Status    Magnesium Collected (09/20/24 0818)    Renal function panel Collected (09/20/24 0818)    Metanephrines Plasma In process            Hospital Course  Brief HPI  Stacey Heath is a 54 y.o. 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 Patient was transferred to the ICU for hypertensive crisis with SBP>200. Patient has history of refractory hypertension on multiple antihypertensives including: carvedilol, clonidine, hydralazine, isosorbitde mononitrate, valsartan, and Nifedipine ER. Patient is able to urinate and takes toremide on non-dialysis days as well. When patient initially presented to the Ed, she endorsed chest pain, palpitations and headache.     Patient arrived to the MICU with a SBP greater than 200. The patient was placed on titratable nicardipine drip when she arrived. Her home medication was reviewed and she was restarted on her home antihypertensives as well. Furthermore, the patient received Tylenol PRN and one dose of her home Sumatriptan for her migraine headache. On imaging, a head Ct indicated no acute intracranial process and CXR showed a small left pleural  effusion. Her BP stabilized on the drip and the Nephrology and Renal Care team were consulted. Patient was scheduled for additional dialysis on 9/18/2024. During her dialysis session, her systolic blood pressure decreased to 88 per nursing, and so her BP medications were held, including the Nicardipine drip. UF was decreased and her blood pressure stabilized.    She was ultimately discharged home on 9/20/24 with refills on her medications and outpatient follow-up.    EDW: 52.2kg    Pertinent Physical Exam At Time of Discharge  GEN: well-appearing, no acute distress  HEENT: PERRLA, EOMI, moist mucous membranes, no scleral icterus  NECK: Supple  CV: RRR, no murmurs/rubs/gallops  PULM: Breathing comfortably, clear to auscultation bilaterally  AB: Soft, non-tender, non-distended  : No in-dwelling clifford  MSK: No lower extremity edema bilaterally  VASC: Right upper extremity AVF with (+) bruit and thrill  NEURO: A&Ox3, following commands, conversational    Home Medications     Medication List      START taking these medications     melatonin 5 mg tablet; Take 1 tablet (5 mg) by mouth once daily at   bedtime.; Replaces: melatonin 5 mg capsule     CONTINUE taking these medications     * albuterol 1.25 mg/3 mL nebulizer solution; Take 3 mL (1.25 mg) by   nebulization every 6 hours if needed for wheezing.   * albuterol sulfate 90 mcg/actuation aero powdr breath act w/sensor   inhaler; Commonly known as: Proair Digihaler; Inhale 2 puffs 2 times a day   as needed for wheezing or shortness of breath.   apixaban 5 mg tablet; Commonly known as: Eliquis; Take 1 tablet (5 mg)   by mouth 2 times a day.   aspirin 81 mg EC tablet; Take 1 tablet (81 mg) by mouth once daily.   atorvastatin 80 mg tablet; Commonly known as: Lipitor; Take 1 tablet (80   mg) by mouth once daily at bedtime.   B complex-vitamin C-folic acid 1 mg capsule; Commonly known as:   Nephrocaps; Take 1 capsule by mouth once daily.   calcium acetate 667 mg capsule;  Commonly known as: Phoslo; Take 2   capsules (1,334 mg) by mouth 3 times daily (morning, midday, late   afternoon).   carvedilol 25 mg tablet; Commonly known as: Coreg; Take 2 tablets (50   mg) by mouth 2 times a day.   cloNIDine 0.2 mg tablet; Commonly known as: Catapres; Take 1 tablet (0.2   mg) by mouth 3 times a day.   epoetin joceline 10,000 unit/mL injection; Commonly known as:   Epogen,Procrit; Inject 1 mL (10,000 Units) under the skin 3 times a week.   fluticasone 50 mcg/actuation nasal spray; Commonly known as: Flonase;   Administer 2 sprays into each nostril once daily. Shake gently. Before   first use, prime pump. After use, clean tip and replace cap.   fluticasone furoate-vilanteroL 100-25 mcg/dose inhaler; Commonly known   as: Breo Ellipta; Inhale 1 puff once daily.   gabapentin 300 mg capsule; Commonly known as: Neurontin; Take 1 capsule   (300 mg) by mouth once daily at bedtime.   hydrALAZINE 100 mg tablet; Commonly known as: Apresoline; Take 1 tablet   (100 mg) by mouth 3 times a day.   hydrOXYzine HCL 25 mg tablet; Commonly known as: Atarax; Take 1 tablet   (25 mg) by mouth every 6 hours if needed for anxiety.   isosorbide mononitrate  mg 24 hr tablet; Commonly known as: Imdur;   Take 1 tablet (120 mg) by mouth once daily. Do not crush or chew.   NIFEdipine ER 90 mg 24 hr tablet; Commonly known as: Adalat CC; Take 1   tablet (90 mg) by mouth once daily in the morning. Take before meals. Do   not crush, chew, or split.   pantoprazole 40 mg EC tablet; Commonly known as: ProtoNix; Take 1 tablet   (40 mg) by mouth once daily in the morning. Take before meals. Do not   crush, chew, or split.   polyethylene glycol 17 gram/dose powder; Commonly known as: Glycolax,   Miralax; Take 17 g (1 scoop dissolved in liquid) by mouth once daily. Do   not start before July 26, 2024.   sodium chloride 0.65 % nasal spray; Commonly known as: Ocean; Administer   1 spray into each nostril 4 times a day as needed for  congestion.   SUMAtriptan 25 mg tablet; Commonly known as: Imitrex; Take 1 tablet (25   mg) by mouth 1 time if needed for migraine. May repeat dose once in 2   hours if no relief.  Do not exceed 2 doses in 24 hours.   torsemide 20 mg tablet; Commonly known as: Demadex; Take 2 tablets once   daily on non-dialysis days only. Do not take on dialysis days   valsartan 320 mg tablet; Commonly known as: Diovan; Take 1 tablet (320   mg) by mouth once daily.  * This list has 2 medication(s) that are the same as other medications   prescribed for you. Read the directions carefully, and ask your doctor or   other care provider to review them with you.     STOP taking these medications     melatonin 5 mg capsule; Replaced by: melatonin 5 mg tablet       Outpatient Follow-Up  Future Appointments   Date Time Provider Department [place]   9/25/2024 11:30 AM Bess Hurst RD U[facility]ACOMgmt [facility]   10/4/2024  3:15 PM [facility] CT 1 CMCCAICCT [facility]   11/6/2024 11:00 AM LILLY Leahy-CNP [id number] Academic   11/8/2024 11:00 AM Sherri Gastelum MD [id number] Academic   1/17/2025 11:40 AM Judy Alcaraz MD [id number] Academic   1/27/2025  4:00 PM Makenna Barraza MD [id number] Academic       Lisa Domínguez MD        >30 minutes were spent on discharge coordination and planning.

## 2024-09-20 NOTE — CARE PLAN
Patient missed out on recertification of her SNAP benefits because of hospitalization. I am going to resubmit an application on her behalf and take her additional pantry information.    Community Resource Name:   Phone Number:   Staff Member:      Discussed the following topics on behalf of the patient:  []  Behavioral Health Assistance     []  Case Management  []   Assistance  []  Digital Equity Assistance  []  Dental Health Assistance  []  Education Assistance  []  Employment Assistance  []  Financial Strain Relief Assistance  [x]  Food Insecurity Assistance  []  Healthcare Coverage Assistance  []  Housing Stability Assistance  []  IP Violence Relief Assistance  []  Legal Assistance  []  Physical Activity Assistance  []  Social Connection Assistance  []  Stress Relief Assistance   []  Substance Abuse Assistance  []  Transportation Assistance  []  Utility Assistance  []  Other: [insert comment here]    Next Steps:         Yolie Reed LCSW

## 2024-09-24 ENCOUNTER — CLINICAL SUPPORT (OUTPATIENT)
Dept: EMERGENCY MEDICINE | Facility: HOSPITAL | Age: 54
End: 2024-09-24
Payer: COMMERCIAL

## 2024-09-24 ENCOUNTER — HOSPITAL ENCOUNTER (OUTPATIENT)
Facility: HOSPITAL | Age: 54
Setting detail: OBSERVATION
Discharge: HOME | End: 2024-09-26
Attending: EMERGENCY MEDICINE | Admitting: STUDENT IN AN ORGANIZED HEALTH CARE EDUCATION/TRAINING PROGRAM
Payer: COMMERCIAL

## 2024-09-24 ENCOUNTER — APPOINTMENT (OUTPATIENT)
Dept: RADIOLOGY | Facility: HOSPITAL | Age: 54
End: 2024-09-24
Payer: COMMERCIAL

## 2024-09-24 DIAGNOSIS — E66.811 OBESITY, CLASS I, BMI 30-34.9: ICD-10-CM

## 2024-09-24 DIAGNOSIS — I16.0 HYPERTENSIVE URGENCY: ICD-10-CM

## 2024-09-24 DIAGNOSIS — Z99.2 DIALYSIS PATIENT (CMS-HCC): ICD-10-CM

## 2024-09-24 DIAGNOSIS — T80.90XA: Primary | ICD-10-CM

## 2024-09-24 DIAGNOSIS — Z76.82 KIDNEY TRANSPLANT CANDIDATE: ICD-10-CM

## 2024-09-24 DIAGNOSIS — I25.10 CORONARY ARTERY DISEASE INVOLVING NATIVE CORONARY ARTERY OF NATIVE HEART WITHOUT ANGINA PECTORIS: ICD-10-CM

## 2024-09-24 DIAGNOSIS — Z94.9 TRANSPLANT: ICD-10-CM

## 2024-09-24 DIAGNOSIS — R87.612 LOW GRADE SQUAMOUS INTRAEPITHELIAL LESION ON CYTOLOGIC SMEAR OF CERVIX (LGSIL): ICD-10-CM

## 2024-09-24 DIAGNOSIS — L74.9 SWEATING ABNORMALITY: ICD-10-CM

## 2024-09-24 DIAGNOSIS — N19 UREMIA: ICD-10-CM

## 2024-09-24 DIAGNOSIS — L02.419 ABSCESS OF AXILLARY REGION: ICD-10-CM

## 2024-09-24 DIAGNOSIS — F17.200 NICOTINE USE DISORDER: ICD-10-CM

## 2024-09-24 DIAGNOSIS — E78.5 HYPERLIPIDEMIA, UNSPECIFIED HYPERLIPIDEMIA TYPE: ICD-10-CM

## 2024-09-24 DIAGNOSIS — E78.5 COMPLEX DYSLIPIDEMIA: ICD-10-CM

## 2024-09-24 DIAGNOSIS — R06.02 SHORTNESS OF BREATH: ICD-10-CM

## 2024-09-24 DIAGNOSIS — E11.42 POLYNEUROPATHY DUE TO TYPE 2 DIABETES MELLITUS (MULTI): ICD-10-CM

## 2024-09-24 DIAGNOSIS — H25.13 NUCLEAR SENILE CATARACT OF BOTH EYES: ICD-10-CM

## 2024-09-24 DIAGNOSIS — I10 POORLY-CONTROLLED HYPERTENSION: ICD-10-CM

## 2024-09-24 DIAGNOSIS — Z99.2 ESRD (END STAGE RENAL DISEASE) ON DIALYSIS (MULTI): ICD-10-CM

## 2024-09-24 DIAGNOSIS — F41.9 ANXIETY: ICD-10-CM

## 2024-09-24 DIAGNOSIS — I82.622 ACUTE DEEP VEIN THROMBOSIS (DVT) OF LEFT UPPER EXTREMITY (MULTI): ICD-10-CM

## 2024-09-24 DIAGNOSIS — F41.8 ANXIETY ABOUT HEALTH: ICD-10-CM

## 2024-09-24 DIAGNOSIS — I50.9 ACUTE ON CHRONIC CONGESTIVE HEART FAILURE, UNSPECIFIED HEART FAILURE TYPE: ICD-10-CM

## 2024-09-24 DIAGNOSIS — I10 ESSENTIAL HYPERTENSION: ICD-10-CM

## 2024-09-24 DIAGNOSIS — G62.9 NEUROPATHY: ICD-10-CM

## 2024-09-24 DIAGNOSIS — J45.40 MODERATE PERSISTENT ASTHMA, UNSPECIFIED WHETHER COMPLICATED (HHS-HCC): ICD-10-CM

## 2024-09-24 DIAGNOSIS — N28.9 NEPHROPATHY: ICD-10-CM

## 2024-09-24 DIAGNOSIS — K59.00 CONSTIPATION, UNSPECIFIED CONSTIPATION TYPE: ICD-10-CM

## 2024-09-24 DIAGNOSIS — I31.39 OTHER PERICARDIAL EFFUSION (NONINFLAMMATORY) (HHS-HCC): ICD-10-CM

## 2024-09-24 DIAGNOSIS — I50.1 ACUTE PULMONARY EDEMA WITH HEART DISEASE: ICD-10-CM

## 2024-09-24 DIAGNOSIS — D64.9 NORMOCYTIC NORMOCHROMIC ANEMIA: ICD-10-CM

## 2024-09-24 DIAGNOSIS — R06.09 DYSPNEA ON EXERTION: ICD-10-CM

## 2024-09-24 DIAGNOSIS — I16.1 HYPERTENSIVE EMERGENCY: ICD-10-CM

## 2024-09-24 DIAGNOSIS — L73.2 HIDRADENITIS: ICD-10-CM

## 2024-09-24 DIAGNOSIS — N18.6 ESRD (END STAGE RENAL DISEASE) ON DIALYSIS (MULTI): ICD-10-CM

## 2024-09-24 DIAGNOSIS — E66.9 OBESITY, CLASS I, BMI 30-34.9: ICD-10-CM

## 2024-09-24 DIAGNOSIS — K52.9 INFLAMMATION OF STOMACH AND INTESTINE: ICD-10-CM

## 2024-09-24 DIAGNOSIS — J81.0 FLASH PULMONARY EDEMA: ICD-10-CM

## 2024-09-24 DIAGNOSIS — I50.31 ACUTE DIASTOLIC CHF (CONGESTIVE HEART FAILURE): ICD-10-CM

## 2024-09-24 DIAGNOSIS — E44.0 MALNUTRITION OF MODERATE DEGREE (MULTI): ICD-10-CM

## 2024-09-24 DIAGNOSIS — R87.612 LOW GRADE SQUAMOUS INTRAEPITH LESION ON CYTOLOGIC SMEAR CERVIX (LGSIL): ICD-10-CM

## 2024-09-24 DIAGNOSIS — I50.32 CHRONIC HEART FAILURE WITH PRESERVED EJECTION FRACTION (HFPEF): ICD-10-CM

## 2024-09-24 LAB
ALBUMIN SERPL BCP-MCNC: 3.8 G/DL (ref 3.4–5)
ALP SERPL-CCNC: 119 U/L (ref 33–110)
ALT SERPL W P-5'-P-CCNC: 29 U/L (ref 7–45)
ANION GAP BLDV CALCULATED.4IONS-SCNC: 15 MMOL/L (ref 10–25)
ANION GAP SERPL CALC-SCNC: 21 MMOL/L (ref 10–20)
ANNOTATION COMMENT IMP: ABNORMAL
AST SERPL W P-5'-P-CCNC: 20 U/L (ref 9–39)
ATRIAL RATE: 76 BPM
BASE EXCESS BLDV CALC-SCNC: -0.7 MMOL/L (ref -2–3)
BASOPHILS # BLD AUTO: 0.07 X10*3/UL (ref 0–0.1)
BASOPHILS NFR BLD AUTO: 1.3 %
BILIRUB SERPL-MCNC: 0.4 MG/DL (ref 0–1.2)
BODY TEMPERATURE: 37 DEGREES CELSIUS
BUN SERPL-MCNC: 72 MG/DL (ref 6–23)
CA-I BLDV-SCNC: 1.19 MMOL/L (ref 1.1–1.33)
CALCIUM SERPL-MCNC: 9.3 MG/DL (ref 8.6–10.6)
CARDIAC TROPONIN I PNL SERPL HS: 47 NG/L (ref 0–34)
CARDIAC TROPONIN I PNL SERPL HS: 53 NG/L (ref 0–34)
CHLORIDE BLDV-SCNC: 104 MMOL/L (ref 98–107)
CHLORIDE SERPL-SCNC: 101 MMOL/L (ref 98–107)
CO2 SERPL-SCNC: 24 MMOL/L (ref 21–32)
CREAT SERPL-MCNC: 10.77 MG/DL (ref 0.5–1.05)
EGFRCR SERPLBLD CKD-EPI 2021: 4 ML/MIN/1.73M*2
EOSINOPHIL # BLD AUTO: 0.62 X10*3/UL (ref 0–0.7)
EOSINOPHIL NFR BLD AUTO: 11.1 %
ERYTHROCYTE [DISTWIDTH] IN BLOOD BY AUTOMATED COUNT: 18.1 % (ref 11.5–14.5)
GLUCOSE BLD MANUAL STRIP-MCNC: 304 MG/DL (ref 74–99)
GLUCOSE BLDV-MCNC: 151 MG/DL (ref 74–99)
GLUCOSE SERPL-MCNC: 150 MG/DL (ref 74–99)
HCO3 BLDV-SCNC: 24.2 MMOL/L (ref 22–26)
HCT VFR BLD AUTO: 32 % (ref 36–46)
HCT VFR BLD EST: 31 % (ref 36–46)
HGB BLD-MCNC: 9.6 G/DL (ref 12–16)
HGB BLDV-MCNC: 10.2 G/DL (ref 12–16)
IMM GRANULOCYTES # BLD AUTO: 0.03 X10*3/UL (ref 0–0.7)
IMM GRANULOCYTES NFR BLD AUTO: 0.5 % (ref 0–0.9)
INHALED O2 CONCENTRATION: 21 %
LACTATE BLDV-SCNC: 0.8 MMOL/L (ref 0.4–2)
LYMPHOCYTES # BLD AUTO: 0.77 X10*3/UL (ref 1.2–4.8)
LYMPHOCYTES NFR BLD AUTO: 13.8 %
MAGNESIUM SERPL-MCNC: 2.62 MG/DL (ref 1.6–2.4)
MCH RBC QN AUTO: 29.2 PG (ref 26–34)
MCHC RBC AUTO-ENTMCNC: 30 G/DL (ref 32–36)
MCV RBC AUTO: 97 FL (ref 80–100)
METANEPHS SERPL-SCNC: 0.18 NMOL/L (ref 0–0.49)
MONOCYTES # BLD AUTO: 0.43 X10*3/UL (ref 0.1–1)
MONOCYTES NFR BLD AUTO: 7.7 %
NEUTROPHILS # BLD AUTO: 3.65 X10*3/UL (ref 1.2–7.7)
NEUTROPHILS NFR BLD AUTO: 65.6 %
NORMETANEPH FREE SERPL-SCNC: 1.14 NMOL/L (ref 0–0.89)
NRBC BLD-RTO: 0 /100 WBCS (ref 0–0)
OXYHGB MFR BLDV: 84.3 % (ref 45–75)
P AXIS: 48 DEGREES
P OFFSET: 192 MS
P ONSET: 144 MS
PCO2 BLDV: 40 MM HG (ref 41–51)
PH BLDV: 7.39 PH (ref 7.33–7.43)
PHOSPHATE SERPL-MCNC: 6.5 MG/DL (ref 2.5–4.9)
PLATELET # BLD AUTO: 206 X10*3/UL (ref 150–450)
PO2 BLDV: 58 MM HG (ref 35–45)
POTASSIUM BLDV-SCNC: 6 MMOL/L (ref 3.5–5.3)
POTASSIUM SERPL-SCNC: 5.2 MMOL/L (ref 3.5–5.3)
POTASSIUM SERPL-SCNC: 5.2 MMOL/L (ref 3.5–5.3)
PR INTERVAL: 150 MS
PROT SERPL-MCNC: 6.6 G/DL (ref 6.4–8.2)
Q ONSET: 219 MS
QRS COUNT: 13 BEATS
QRS DURATION: 98 MS
QT INTERVAL: 400 MS
QTC CALCULATION(BAZETT): 450 MS
QTC FREDERICIA: 432 MS
R AXIS: 28 DEGREES
RBC # BLD AUTO: 3.29 X10*6/UL (ref 4–5.2)
SAO2 % BLDV: 85 % (ref 45–75)
SODIUM BLDV-SCNC: 137 MMOL/L (ref 136–145)
SODIUM SERPL-SCNC: 141 MMOL/L (ref 136–145)
T AXIS: 189 DEGREES
T OFFSET: 419 MS
VENTRICULAR RATE: 76 BPM
WBC # BLD AUTO: 5.6 X10*3/UL (ref 4.4–11.3)

## 2024-09-24 PROCEDURE — 93010 ELECTROCARDIOGRAM REPORT: CPT | Performed by: EMERGENCY MEDICINE

## 2024-09-24 PROCEDURE — 2500000005 HC RX 250 GENERAL PHARMACY W/O HCPCS

## 2024-09-24 PROCEDURE — 84484 ASSAY OF TROPONIN QUANT: CPT

## 2024-09-24 PROCEDURE — 71046 X-RAY EXAM CHEST 2 VIEWS: CPT | Performed by: STUDENT IN AN ORGANIZED HEALTH CARE EDUCATION/TRAINING PROGRAM

## 2024-09-24 PROCEDURE — 96375 TX/PRO/DX INJ NEW DRUG ADDON: CPT

## 2024-09-24 PROCEDURE — 82947 ASSAY GLUCOSE BLOOD QUANT: CPT

## 2024-09-24 PROCEDURE — 2500000001 HC RX 250 WO HCPCS SELF ADMINISTERED DRUGS (ALT 637 FOR MEDICARE OP): Performed by: STUDENT IN AN ORGANIZED HEALTH CARE EDUCATION/TRAINING PROGRAM

## 2024-09-24 PROCEDURE — 2500000002 HC RX 250 W HCPCS SELF ADMINISTERED DRUGS (ALT 637 FOR MEDICARE OP, ALT 636 FOR OP/ED): Performed by: STUDENT IN AN ORGANIZED HEALTH CARE EDUCATION/TRAINING PROGRAM

## 2024-09-24 PROCEDURE — 84132 ASSAY OF SERUM POTASSIUM: CPT | Performed by: EMERGENCY MEDICINE

## 2024-09-24 PROCEDURE — 71046 X-RAY EXAM CHEST 2 VIEWS: CPT

## 2024-09-24 PROCEDURE — 83735 ASSAY OF MAGNESIUM: CPT

## 2024-09-24 PROCEDURE — 96365 THER/PROPH/DIAG IV INF INIT: CPT

## 2024-09-24 PROCEDURE — 2500000001 HC RX 250 WO HCPCS SELF ADMINISTERED DRUGS (ALT 637 FOR MEDICARE OP)

## 2024-09-24 PROCEDURE — 82330 ASSAY OF CALCIUM: CPT

## 2024-09-24 PROCEDURE — G0378 HOSPITAL OBSERVATION PER HR: HCPCS

## 2024-09-24 PROCEDURE — 99221 1ST HOSP IP/OBS SF/LOW 40: CPT | Performed by: INTERNAL MEDICINE

## 2024-09-24 PROCEDURE — 80053 COMPREHEN METABOLIC PANEL: CPT

## 2024-09-24 PROCEDURE — 99285 EMERGENCY DEPT VISIT HI MDM: CPT | Mod: 25

## 2024-09-24 PROCEDURE — 99223 1ST HOSP IP/OBS HIGH 75: CPT | Performed by: STUDENT IN AN ORGANIZED HEALTH CARE EDUCATION/TRAINING PROGRAM

## 2024-09-24 PROCEDURE — 84100 ASSAY OF PHOSPHORUS: CPT

## 2024-09-24 PROCEDURE — 36415 COLL VENOUS BLD VENIPUNCTURE: CPT

## 2024-09-24 PROCEDURE — 2500000004 HC RX 250 GENERAL PHARMACY W/ HCPCS (ALT 636 FOR OP/ED)

## 2024-09-24 PROCEDURE — 84132 ASSAY OF SERUM POTASSIUM: CPT

## 2024-09-24 PROCEDURE — 93005 ELECTROCARDIOGRAM TRACING: CPT

## 2024-09-24 PROCEDURE — 2500000002 HC RX 250 W HCPCS SELF ADMINISTERED DRUGS (ALT 637 FOR MEDICARE OP, ALT 636 FOR OP/ED)

## 2024-09-24 PROCEDURE — 85025 COMPLETE CBC W/AUTO DIFF WBC: CPT

## 2024-09-24 PROCEDURE — 99285 EMERGENCY DEPT VISIT HI MDM: CPT | Performed by: EMERGENCY MEDICINE

## 2024-09-24 RX ORDER — TORSEMIDE 20 MG/1
20 TABLET ORAL DAILY
Status: DISCONTINUED | OUTPATIENT
Start: 2024-09-24 | End: 2024-09-26 | Stop reason: HOSPADM

## 2024-09-24 RX ORDER — ACETAMINOPHEN 325 MG/1
975 TABLET ORAL EVERY 6 HOURS PRN
Status: DISCONTINUED | OUTPATIENT
Start: 2024-09-24 | End: 2024-09-26 | Stop reason: HOSPADM

## 2024-09-24 RX ORDER — ACETAMINOPHEN 325 MG/1
975 TABLET ORAL ONCE
Status: COMPLETED | OUTPATIENT
Start: 2024-09-24 | End: 2024-09-24

## 2024-09-24 RX ORDER — HYDROXYZINE HYDROCHLORIDE 25 MG/1
25 TABLET, FILM COATED ORAL EVERY 6 HOURS PRN
Status: DISCONTINUED | OUTPATIENT
Start: 2024-09-24 | End: 2024-09-26 | Stop reason: HOSPADM

## 2024-09-24 RX ORDER — VALSARTAN 160 MG/1
320 TABLET ORAL DAILY
Status: DISCONTINUED | OUTPATIENT
Start: 2024-09-24 | End: 2024-09-26 | Stop reason: HOSPADM

## 2024-09-24 RX ORDER — ALBUTEROL SULFATE 5 MG/ML
10 SOLUTION RESPIRATORY (INHALATION) ONCE
Status: DISCONTINUED | OUTPATIENT
Start: 2024-09-24 | End: 2024-09-24

## 2024-09-24 RX ORDER — POLYETHYLENE GLYCOL 3350 17 G/17G
17 POWDER, FOR SOLUTION ORAL DAILY PRN
Status: DISCONTINUED | OUTPATIENT
Start: 2024-09-24 | End: 2024-09-26 | Stop reason: HOSPADM

## 2024-09-24 RX ORDER — NAPROXEN SODIUM 220 MG/1
324 TABLET, FILM COATED ORAL ONCE
Status: COMPLETED | OUTPATIENT
Start: 2024-09-24 | End: 2024-09-24

## 2024-09-24 RX ORDER — CALCIUM GLUCONATE 20 MG/ML
2 INJECTION, SOLUTION INTRAVENOUS ONCE
Status: COMPLETED | OUTPATIENT
Start: 2024-09-24 | End: 2024-09-24

## 2024-09-24 RX ORDER — NIFEDIPINE 30 MG/1
90 TABLET, FILM COATED, EXTENDED RELEASE ORAL
Status: DISCONTINUED | OUTPATIENT
Start: 2024-09-25 | End: 2024-09-26 | Stop reason: HOSPADM

## 2024-09-24 RX ORDER — HYDROMORPHONE HYDROCHLORIDE 1 MG/ML
0.5 INJECTION, SOLUTION INTRAMUSCULAR; INTRAVENOUS; SUBCUTANEOUS ONCE
Status: COMPLETED | OUTPATIENT
Start: 2024-09-24 | End: 2024-09-24

## 2024-09-24 RX ORDER — FLUTICASONE PROPIONATE 50 MCG
2 SPRAY, SUSPENSION (ML) NASAL DAILY
Status: DISCONTINUED | OUTPATIENT
Start: 2024-09-24 | End: 2024-09-26 | Stop reason: HOSPADM

## 2024-09-24 RX ORDER — DEXTROSE 50 % IN WATER (D50W) INTRAVENOUS SYRINGE
25 ONCE
Status: COMPLETED | OUTPATIENT
Start: 2024-09-24 | End: 2024-09-24

## 2024-09-24 RX ORDER — DIPHENHYDRAMINE HCL 25 MG
25 CAPSULE ORAL ONCE
Status: COMPLETED | OUTPATIENT
Start: 2024-09-24 | End: 2024-09-24

## 2024-09-24 RX ORDER — ATORVASTATIN CALCIUM 20 MG/1
80 TABLET, FILM COATED ORAL NIGHTLY
Status: DISCONTINUED | OUTPATIENT
Start: 2024-09-24 | End: 2024-09-26 | Stop reason: HOSPADM

## 2024-09-24 RX ORDER — GABAPENTIN 300 MG/1
300 CAPSULE ORAL NIGHTLY
Status: DISCONTINUED | OUTPATIENT
Start: 2024-09-24 | End: 2024-09-26 | Stop reason: HOSPADM

## 2024-09-24 RX ORDER — ASPIRIN 81 MG/1
81 TABLET ORAL DAILY
Status: DISCONTINUED | OUTPATIENT
Start: 2024-09-25 | End: 2024-09-26 | Stop reason: HOSPADM

## 2024-09-24 RX ORDER — PANTOPRAZOLE SODIUM 40 MG/1
40 TABLET, DELAYED RELEASE ORAL
Status: DISCONTINUED | OUTPATIENT
Start: 2024-09-25 | End: 2024-09-26 | Stop reason: HOSPADM

## 2024-09-24 RX ORDER — ISOSORBIDE MONONITRATE 30 MG/1
120 TABLET, EXTENDED RELEASE ORAL DAILY
Status: DISCONTINUED | OUTPATIENT
Start: 2024-09-24 | End: 2024-09-26 | Stop reason: HOSPADM

## 2024-09-24 RX ORDER — CARVEDILOL 12.5 MG/1
50 TABLET ORAL 2 TIMES DAILY
Status: DISCONTINUED | OUTPATIENT
Start: 2024-09-24 | End: 2024-09-26 | Stop reason: HOSPADM

## 2024-09-24 RX ORDER — CLONIDINE HYDROCHLORIDE 0.1 MG/1
0.2 TABLET ORAL 3 TIMES DAILY
Status: DISCONTINUED | OUTPATIENT
Start: 2024-09-24 | End: 2024-09-26 | Stop reason: HOSPADM

## 2024-09-24 RX ORDER — CALCIUM ACETATE 667 MG/1
1334 CAPSULE ORAL
Status: DISCONTINUED | OUTPATIENT
Start: 2024-09-25 | End: 2024-09-26 | Stop reason: HOSPADM

## 2024-09-24 RX ORDER — FLUTICASONE FUROATE AND VILANTEROL 100; 25 UG/1; UG/1
1 POWDER RESPIRATORY (INHALATION)
Status: DISCONTINUED | OUTPATIENT
Start: 2024-09-25 | End: 2024-09-26 | Stop reason: HOSPADM

## 2024-09-24 RX ORDER — ALBUTEROL SULFATE 90 UG/1
2 INHALANT RESPIRATORY (INHALATION) EVERY 4 HOURS PRN
Status: DISCONTINUED | OUTPATIENT
Start: 2024-09-24 | End: 2024-09-26 | Stop reason: HOSPADM

## 2024-09-24 RX ORDER — HYDRALAZINE HYDROCHLORIDE 25 MG/1
100 TABLET, FILM COATED ORAL 3 TIMES DAILY
Status: DISCONTINUED | OUTPATIENT
Start: 2024-09-24 | End: 2024-09-26 | Stop reason: HOSPADM

## 2024-09-24 ASSESSMENT — COLUMBIA-SUICIDE SEVERITY RATING SCALE - C-SSRS
5. HAVE YOU STARTED TO WORK OUT OR WORKED OUT THE DETAILS OF HOW TO KILL YOURSELF? DO YOU INTEND TO CARRY OUT THIS PLAN?: NO
4. HAVE YOU HAD THESE THOUGHTS AND HAD SOME INTENTION OF ACTING ON THEM?: NO
1. IN THE PAST MONTH, HAVE YOU WISHED YOU WERE DEAD OR WISHED YOU COULD GO TO SLEEP AND NOT WAKE UP?: NO
2. HAVE YOU ACTUALLY HAD ANY THOUGHTS OF KILLING YOURSELF?: NO
6. HAVE YOU EVER DONE ANYTHING, STARTED TO DO ANYTHING, OR PREPARED TO DO ANYTHING TO END YOUR LIFE?: NO

## 2024-09-24 ASSESSMENT — PAIN - FUNCTIONAL ASSESSMENT: PAIN_FUNCTIONAL_ASSESSMENT: 0-10

## 2024-09-24 ASSESSMENT — LIFESTYLE VARIABLES
EVER HAD A DRINK FIRST THING IN THE MORNING TO STEADY YOUR NERVES TO GET RID OF A HANGOVER: NO
HAVE YOU EVER FELT YOU SHOULD CUT DOWN ON YOUR DRINKING: NO
EVER FELT BAD OR GUILTY ABOUT YOUR DRINKING: NO
HAVE PEOPLE ANNOYED YOU BY CRITICIZING YOUR DRINKING: NO
TOTAL SCORE: 0

## 2024-09-24 ASSESSMENT — PAIN SCALES - GENERAL: PAINLEVEL_OUTOF10: 0 - NO PAIN

## 2024-09-24 NOTE — PROGRESS NOTES
"Stacey Heath  Age: 54 y.o.  MRN: [id number]  Date: 9/18/2024  Location of service: in community    Program Details  Medicaid Community Clinical Case Management  Status: Enrolled  Effective Dates: 10/17/2023 - present  Responsible Staff: NAREN Davidson      Goals Reviewed:  Problem: Negative Experience, Conflict with, or Distrust of Providers and/or Health System       Goal: Plan to Address Patient Specific Negative Experience, Distrust, or Conflict with Providers and/or Health System       Priority: High        Problem: Risk of Uncoordinated Care       Goal: Care will be Coordinated and Supported by a Multidisciplinary Team of Providers       Priority: High          Summary:  This writer met with patient in the hospital.  Patient states she feels good and is \"just dealing with high BP\", not fluid overload. Patient states she had heart palpitations that scared her prior to admission.  Patient discussed her relationship with her family.   Patient states she is going to try peritoneal dialysis. Patient states she was told this should help with BP, kidney function, and appetite.  While this writer was present, the provider came in and did an assessment.    Appointment start time: 1208  Appointment completion time: 1305  Total time spent with patient (in minutes): 57  Non-Billable Time: 57  Billable Time Total: 0    Roberta Gallego RN    "

## 2024-09-24 NOTE — CONSULTS
Reason For Consult  ESRD    History Of Present Illness  Stacey Heath is a 54 y.o. female presenting with ESRD.  Went to HD today and felt dizzy; did not get dialyzed; TTS [facility]/Dr Lund; last HD 9/21 post weight 53; DW 51; ELI AVF  Labs: K 5.2; Hgb 9.6; /67; Mircera 150mcg q 2 weeks last dose given 9/12       Past Medical History  She has a past medical history of Bacteremia due to methicillin resistant Staphylococcus aureus (07/08/2024), Cardiac/pericardial tamponade ([facility]) (01/20/2024), CHF (congestive heart failure) (Multi), COPD (chronic obstructive pulmonary disease) (Multi), Coronary artery disease, Disorder of sweat glands (02/25/2023), Dry eye syndrome of bilateral lacrimal glands (03/07/2017), ESRD (end stage renal disease) (Multi), Essential (primary) hypertension (12/27/2022), History of acute pancreatitis (12/21/2020), Low grade squamous intraepithelial lesion (LGSIL) on cervicovaginal cytologic smear (02/25/2023), Migraines, 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 (02/25/2023), and Type 2 myocardial infarction (Multi) (01/20/2024).    Surgical History  She has a past surgical history that includes Tubal ligation (03/07/2017); Other surgical history (03/07/2017); Appendectomy (03/07/2017); Other surgical history (03/07/2017); Other surgical history (12/08/2021); Other surgical history (12/08/2021); and CT angio abdomen w and or wo IV IV contrast (4/23/2023).     Social History  She reports that she has quit smoking. Her smoking use included cigarettes. She has been exposed to tobacco smoke. She has never used smokeless tobacco. She reports that she does not currently use alcohol. She reports that she does not currently use drugs after having used the following drugs: Cocaine and Marijuana.    Family History  Family History   Problem Relation Name Age of Onset    Other (Cerebrovascular Accident) Father      Heart failure Paternal Grandmother      Breast cancer Paternal Grandmother       "Other (Primary Cervical Cancer) Paternal Grandmother      Diabetes Paternal Grandfather      Breast cancer Father's Sister      Ovarian cancer Father's Sister          Allergies  Iodine, Bioflavonoids, Codeine, Flowers, Hydromorphone, and Shellfish containing products    Review of Systems  Headache OTW Neg 10/14    Physical Exam  NAD, o2 per nc  Sclera AI s inj, facial swelling  MMM, no sores  Deferred secondary to COVID  No edema  No tremor  No rash  Appropriate           I&O 24HR  No intake or output data in the 24 hours ending 09/24/24 1916    Vitals 24HR  Heart Rate:  [72-93]   Temperature:  [36.9 °C (98.4 °F)]   Respirations:  [16]   BP: (122-173)/(67-72)   Height:  [139.7 cm (4' 7\")]   Weight:  [52.6 kg (116 lb)]   Pulse Ox:  [96 %-98 %]        Assessment/Plan       ESRD, volume overload    Assessment & Plan      HD per chronic, IUF tomorrow      Makenna Barraza MD    "

## 2024-09-24 NOTE — ED TRIAGE NOTES
Patient went to dialysis today and felt dizzy. Did not receive her dialysis tx and came to hospital by EMS. Denies CP/SOB/HA/vision changes.

## 2024-09-24 NOTE — ED PROVIDER NOTES
History of Present Illness     History provided by: Patient  Limitations to History: None  External Records Reviewed with Brief Summary: Recent ED and hospitalization records showing admissions for fluid overload.    HPI:  Stacey Heath is a 54 y.o. femalew PMH of ESRD with Tuesday Thursday Saturday dialysis states, hypertension, diabetes, heart failure preserved EF, diabetes, COPD, DVT on Eliquis who presents with dizziness from dialysis center.  She was feeling like this prior to dialysis and did not get dialyzed because of the symptoms.  She stated that she believes that she may have taken more of her blood pressure medicine then she typically does.  Typically she has not a pill container but thinks that maybe she may have doubled her medication.  She was 90 over 60s at her dialysis center but typically runs in the 170s to 220s systolic.  She states that she was feeling dizzy there and states that if she stands up she feels like she is drunk but is okay when she is laying down.  She feels slightly nauseous and is developing a headache.  No numbness tingling or weakness no syncopal episodes.  No seizure-like activity.  She otherwise in her normal state of health no chest pain nausea vomiting prior to this diarrhea.    Physical Exam   Triage vitals:  T 36.9 °C (98.4 °F)  HR 72  /72  RR 16  O2 96 % None (Room air)    General: Awake, alert, in no acute distress  Eyes: Gaze conjugate.  No scleral icterus or injection  HENT: Normo-cephalic, atraumatic. No stridor  CV: Regular rate, regular rhythm. Radial pulses 2+ bilaterally  Resp: Breathing non-labored, speaking in full sentences.  Clear to auscultation bilaterally  GI: Soft, non-distended, non-tender. No rebound or guarding.  : Deferred  MSK/Extremities: No gross bony deformities. Moving all extremities, left upper extremity fistula  Skin: Warm. Appropriate color  Neuro: Alert. Oriented. Face symmetric.  No ataxia no dyad dyskinesia speech is fluent.   Gross strength and sensation intact in b/l UE and LEs  Psych: Appropriate mood and affect      Medical Decision Making & ED Course   Medical Decision Makin y.o. female presents for evaluation of hypertension at her dialysis center in the setting of medication overdose.  She is now with blood pressures in the 120s over 80s which is greatly improved from 90s over 60s seen there.  She is not having any chest pain shortness of breath did not syncopize is otherwise vitally stable.  Not requiring oxygen.  She is mentating appropriately and has no focal neuro deficits concerning for a acute CVA.  I have low suspicion for posterior stroke from blood pressure fluctuations and more likely headache and dizziness related to acute blood pressure drop.  Blood pressure is now improving and symptoms improved while being observed in the ED.  Patient states that she is starting to feel more fluid overloaded and request to be placed on 2 L of nasal cannula for comfort.  She on initial blood gas did have a hemoglobin that was stable at 10.2 and a potassium of 6.  She has chronically peaked T waves on her EKG however given peaked T waves and screening potassium 6 will give calcium and insulin D50.  On formal CMP patient has potassium 5.2.  Elevated BUN and creatinine in the setting of missed dialysis today.  Patient has no indications for emergent dialysis however patient is concerned that she will not build to make until Thursday for dialysis and given her elevated BUN/creatinine and history of sensitivity to even 1 missed session will admit for dialysis.  Patient was discussed with nephrology who will place on a chair for the morning.  Patient remained stable throughout her time in the ED.  Agreeable plan for admission for dialysis and all questions answered.  ----    Differential diagnoses considered include but are not limited to: See MDM     Social Determinants of Health which Significantly Impact Care: Difficulty obtaining  outpatient follow-up The following actions were taken to address these social determinants: Patient offered evaluation by Social Work    EKG Independent Interpretation: EKG interpreted by myself. Please see ED Course and [facility] for full interpretation.    Independent Result Review and Interpretation: Results were independently reviewed and interpreted by myself. Please see ED course and [facility] for full interpretation.    Chronic conditions affecting the patient's care: As documented in the MDM    The patient was discussed with the following consultants/services: Hospitalist/Admitting Provider who accepted the patient for admission    Care Considerations: As per MDM    ED Course:  ED Course as of 09/25/24 0003   Tue Sep 24, 2024   1552 Blood pressure improved on reevaluation.  Patient no longer feeling dizzy or lightheaded.  Does endorse a mild headache. [SC]   1552 XR chest 2 views  Chest x-ray with mild pulmonary congestion bilaterally [SC]   1552 CBC and Auto Differential(!)  Anemia of 9.6 no leukocytosis no thrombocytopenia [SC]   1601 Blood Gas Venous Full Panel  Patient had a venous full panel that was done with a potassium of 6.  Did order K cocktail.  No acidosis or elevated lactate.  Other electrolytes and hemoglobin grossly within normal limits from point-of-care blood gas. [SC]   1603 ECG 12 Lead  EKG independently interpreted with sinus rhythm heart rate of 76.  150 MSQ is 98 MS QTc 450 MS within normal limits no acute ST segment elevations though patient does have early repolarization and peaked T waves.  T wave inversions in lateral leads.  And inferior leads previously seen. [SC]      ED Course User Index  [SC] Jessica Castillo DO         Diagnoses as of 09/25/24 0003   Acute disorder of hemodialysis     Disposition   Patient is admitted to medicine for dialysis    Procedures   Procedures    Patient seen and discussed with ED attending physician.    Jessica Castillo DO  Emergency Medicine     Jessica Castillo  DO  Resident  09/25/24 0003

## 2024-09-25 ENCOUNTER — APPOINTMENT (OUTPATIENT)
Dept: DIALYSIS | Facility: HOSPITAL | Age: 54
End: 2024-09-25
Payer: COMMERCIAL

## 2024-09-25 ENCOUNTER — APPOINTMENT (OUTPATIENT)
Dept: CARE COORDINATION | Facility: CLINIC | Age: 54
End: 2024-09-25
Payer: COMMERCIAL

## 2024-09-25 ENCOUNTER — DOCUMENTATION (OUTPATIENT)
Dept: BEHAVIORAL HEALTH | Facility: CLINIC | Age: 54
End: 2024-09-25

## 2024-09-25 LAB
ALBUMIN SERPL BCP-MCNC: 3.4 G/DL (ref 3.4–5)
ANION GAP SERPL CALC-SCNC: 16 MMOL/L (ref 10–20)
BUN SERPL-MCNC: 36 MG/DL (ref 6–23)
CALCIUM SERPL-MCNC: 8.5 MG/DL (ref 8.6–10.6)
CARDIAC TROPONIN I PNL SERPL HS: 43 NG/L (ref 0–34)
CHLORIDE SERPL-SCNC: 100 MMOL/L (ref 98–107)
CO2 SERPL-SCNC: 31 MMOL/L (ref 21–32)
CREAT SERPL-MCNC: 6.79 MG/DL (ref 0.5–1.05)
EGFRCR SERPLBLD CKD-EPI 2021: 7 ML/MIN/1.73M*2
ERYTHROCYTE [DISTWIDTH] IN BLOOD BY AUTOMATED COUNT: 18 % (ref 11.5–14.5)
GLUCOSE BLD MANUAL STRIP-MCNC: 154 MG/DL (ref 74–99)
GLUCOSE SERPL-MCNC: 113 MG/DL (ref 74–99)
HCT VFR BLD AUTO: 30.3 % (ref 36–46)
HGB BLD-MCNC: 9 G/DL (ref 12–16)
MAGNESIUM SERPL-MCNC: 2.25 MG/DL (ref 1.6–2.4)
MCH RBC QN AUTO: 29.5 PG (ref 26–34)
MCHC RBC AUTO-ENTMCNC: 29.7 G/DL (ref 32–36)
MCV RBC AUTO: 99 FL (ref 80–100)
NRBC BLD-RTO: 0 /100 WBCS (ref 0–0)
PHOSPHATE SERPL-MCNC: 4.9 MG/DL (ref 2.5–4.9)
PLATELET # BLD AUTO: 190 X10*3/UL (ref 150–450)
POTASSIUM SERPL-SCNC: 5.1 MMOL/L (ref 3.5–5.3)
POTASSIUM SERPL-SCNC: 5.5 MMOL/L (ref 3.5–5.3)
RBC # BLD AUTO: 3.05 X10*6/UL (ref 4–5.2)
SODIUM SERPL-SCNC: 142 MMOL/L (ref 136–145)
WBC # BLD AUTO: 4.4 X10*3/UL (ref 4.4–11.3)

## 2024-09-25 PROCEDURE — 83835 ASSAY OF METANEPHRINES: CPT | Performed by: STUDENT IN AN ORGANIZED HEALTH CARE EDUCATION/TRAINING PROGRAM

## 2024-09-25 PROCEDURE — 82947 ASSAY GLUCOSE BLOOD QUANT: CPT

## 2024-09-25 PROCEDURE — 90937 HEMODIALYSIS REPEATED EVAL: CPT

## 2024-09-25 PROCEDURE — 83735 ASSAY OF MAGNESIUM: CPT | Performed by: STUDENT IN AN ORGANIZED HEALTH CARE EDUCATION/TRAINING PROGRAM

## 2024-09-25 PROCEDURE — 84132 ASSAY OF SERUM POTASSIUM: CPT | Performed by: STUDENT IN AN ORGANIZED HEALTH CARE EDUCATION/TRAINING PROGRAM

## 2024-09-25 PROCEDURE — 36415 COLL VENOUS BLD VENIPUNCTURE: CPT | Performed by: STUDENT IN AN ORGANIZED HEALTH CARE EDUCATION/TRAINING PROGRAM

## 2024-09-25 PROCEDURE — 99233 SBSQ HOSP IP/OBS HIGH 50: CPT | Performed by: STUDENT IN AN ORGANIZED HEALTH CARE EDUCATION/TRAINING PROGRAM

## 2024-09-25 PROCEDURE — 2500000002 HC RX 250 W HCPCS SELF ADMINISTERED DRUGS (ALT 637 FOR MEDICARE OP, ALT 636 FOR OP/ED): Performed by: STUDENT IN AN ORGANIZED HEALTH CARE EDUCATION/TRAINING PROGRAM

## 2024-09-25 PROCEDURE — 90935 HEMODIALYSIS ONE EVALUATION: CPT | Performed by: INTERNAL MEDICINE

## 2024-09-25 PROCEDURE — 84484 ASSAY OF TROPONIN QUANT: CPT | Performed by: STUDENT IN AN ORGANIZED HEALTH CARE EDUCATION/TRAINING PROGRAM

## 2024-09-25 PROCEDURE — 2500000001 HC RX 250 WO HCPCS SELF ADMINISTERED DRUGS (ALT 637 FOR MEDICARE OP): Performed by: STUDENT IN AN ORGANIZED HEALTH CARE EDUCATION/TRAINING PROGRAM

## 2024-09-25 PROCEDURE — 80069 RENAL FUNCTION PANEL: CPT | Performed by: STUDENT IN AN ORGANIZED HEALTH CARE EDUCATION/TRAINING PROGRAM

## 2024-09-25 PROCEDURE — G0378 HOSPITAL OBSERVATION PER HR: HCPCS

## 2024-09-25 PROCEDURE — 85027 COMPLETE CBC AUTOMATED: CPT | Performed by: STUDENT IN AN ORGANIZED HEALTH CARE EDUCATION/TRAINING PROGRAM

## 2024-09-25 RX ORDER — HYDROXYZINE HYDROCHLORIDE 25 MG/1
25 TABLET, FILM COATED ORAL EVERY 6 HOURS PRN
Qty: 30 TABLET | Refills: 0 | Status: SHIPPED | OUTPATIENT
Start: 2024-09-25

## 2024-09-25 RX ORDER — ALBUTEROL SULFATE 90 UG/1
2 INHALANT RESPIRATORY (INHALATION) EVERY 4 HOURS PRN
Qty: 18 G | Refills: 5 | Status: SHIPPED | OUTPATIENT
Start: 2024-09-25

## 2024-09-25 RX ORDER — HYDRALAZINE HYDROCHLORIDE 100 MG/1
100 TABLET, FILM COATED ORAL 3 TIMES DAILY
Qty: 90 TABLET | Refills: 0 | Status: SHIPPED | OUTPATIENT
Start: 2024-09-25 | End: 2024-10-25

## 2024-09-25 RX ORDER — ASPIRIN 81 MG/1
81 TABLET ORAL DAILY
Qty: 30 TABLET | Refills: 0 | Status: SHIPPED | OUTPATIENT
Start: 2024-09-25 | End: 2024-10-25

## 2024-09-25 RX ORDER — ALBUTEROL SULFATE 1.25 MG/3ML
1.25 SOLUTION RESPIRATORY (INHALATION) EVERY 6 HOURS PRN
Qty: 90 ML | Refills: 11 | Status: SHIPPED | OUTPATIENT
Start: 2024-09-25

## 2024-09-25 RX ORDER — CARVEDILOL 25 MG/1
50 TABLET ORAL 2 TIMES DAILY
Qty: 120 TABLET | Refills: 0 | Status: SHIPPED | OUTPATIENT
Start: 2024-09-25 | End: 2024-10-25

## 2024-09-25 RX ORDER — INSULIN LISPRO 100 [IU]/ML
0-20 INJECTION, SOLUTION INTRAVENOUS; SUBCUTANEOUS
Status: DISCONTINUED | OUTPATIENT
Start: 2024-09-25 | End: 2024-09-25

## 2024-09-25 RX ORDER — POLYETHYLENE GLYCOL 3350 17 G/17G
17 POWDER, FOR SOLUTION ORAL DAILY
Qty: 238 G | Refills: 0 | Status: SHIPPED | OUTPATIENT
Start: 2024-09-25

## 2024-09-25 RX ORDER — INSULIN LISPRO 100 [IU]/ML
0-5 INJECTION, SOLUTION INTRAVENOUS; SUBCUTANEOUS
Status: DISCONTINUED | OUTPATIENT
Start: 2024-09-25 | End: 2024-09-26 | Stop reason: HOSPADM

## 2024-09-25 RX ORDER — NIFEDIPINE 90 MG/1
90 TABLET, EXTENDED RELEASE ORAL
Qty: 30 TABLET | Refills: 0 | Status: SHIPPED | OUTPATIENT
Start: 2024-09-25 | End: 2024-10-25

## 2024-09-25 RX ORDER — CALCIUM ACETATE 667 MG/1
1334 CAPSULE ORAL
Qty: 180 CAPSULE | Refills: 0 | Status: SHIPPED | OUTPATIENT
Start: 2024-09-25 | End: 2024-10-25

## 2024-09-25 RX ORDER — PANTOPRAZOLE SODIUM 40 MG/1
40 TABLET, DELAYED RELEASE ORAL
Qty: 30 TABLET | Refills: 0 | Status: SHIPPED | OUTPATIENT
Start: 2024-09-25 | End: 2024-10-25

## 2024-09-25 RX ORDER — ISOSORBIDE MONONITRATE 120 MG/1
120 TABLET, EXTENDED RELEASE ORAL DAILY
Qty: 30 TABLET | Refills: 0 | Status: SHIPPED | OUTPATIENT
Start: 2024-09-25 | End: 2024-10-25

## 2024-09-25 RX ORDER — ATORVASTATIN CALCIUM 80 MG/1
80 TABLET, FILM COATED ORAL NIGHTLY
Qty: 30 TABLET | Refills: 0 | Status: SHIPPED | OUTPATIENT
Start: 2024-09-25 | End: 2024-10-25

## 2024-09-25 RX ORDER — VALSARTAN 320 MG/1
320 TABLET ORAL DAILY
Qty: 30 TABLET | Refills: 0 | Status: SHIPPED | OUTPATIENT
Start: 2024-09-25 | End: 2024-10-25

## 2024-09-25 RX ORDER — CLONIDINE HYDROCHLORIDE 0.2 MG/1
0.2 TABLET ORAL 3 TIMES DAILY
Qty: 90 TABLET | Refills: 0 | Status: SHIPPED | OUTPATIENT
Start: 2024-09-25 | End: 2024-10-25

## 2024-09-25 RX ORDER — TORSEMIDE 20 MG/1
TABLET ORAL
Qty: 30 TABLET | Refills: 2 | Status: SHIPPED | OUTPATIENT
Start: 2024-09-25

## 2024-09-25 ASSESSMENT — PAIN SCALES - GENERAL
PAINLEVEL_OUTOF10: 8
PAINLEVEL_OUTOF10: 5 - MODERATE PAIN
PAINLEVEL_OUTOF10: 0 - NO PAIN
PAINLEVEL_OUTOF10: 0 - NO PAIN

## 2024-09-25 ASSESSMENT — PAIN DESCRIPTION - LOCATION: LOCATION: HEAD

## 2024-09-25 ASSESSMENT — PAIN - FUNCTIONAL ASSESSMENT: PAIN_FUNCTIONAL_ASSESSMENT: NO/DENIES PAIN

## 2024-09-25 NOTE — PROGRESS NOTES
Stacey Heath is a 54 y.o. female on day 0 of admission presenting with Acute disorder of hemodialysis.      Subjective   Seen in HD unit, feeling better       Objective     Last Recorded Vitals  /89 (BP Location: Left arm, Patient Position: Lying)   Pulse 89   Temp 36 °C (96.8 °F) (Temporal)   Resp 16   Wt 52.6 kg (116 lb)   SpO2 94%   Intake/Output last 3 Shifts:    Intake/Output Summary (Last 24 hours) at 9/25/2024 1422  Last data filed at 9/25/2024 1000  Gross per 24 hour   Intake 800 ml   Output 2000 ml   Net -1200 ml       Admission Weight  Weight: 52.6 kg (116 lb) (09/24/24 1433)    Daily Weight  09/24/24 : 52.6 kg (116 lb)    Image Results  ECG 12 lead  Normal sinus rhythm  Possible Left atrial enlargement  ST & Marked T wave abnormality, consider inferolateral ischemia  Abnormal ECG  When compared with ECG of 18-SEP-2024 10:38,  No significant change was found    See ED provider note for full interpretation and clinical correlation  Confirmed by Bibi Toussaint ([id number]) on 9/24/2024 10:58:39 PM  XR chest 2 views  Narrative: Interpreted By:  Slim Rodriguez,   STUDY:  XR CHEST 2 VIEWS;  9/24/2024 3:47 pm      INDICATION:  Signs/Symptoms:diizzy, missed HD.          COMPARISON:  09/17/2024      ACCESSION NUMBER(S):  [id number]      ORDERING CLINICIAN:  BRANDY NORRIS      FINDINGS:  AP and lateral radiographs of the chest were provided.              CARDIOMEDIASTINAL SILHOUETTE:  Cardiomediastinal silhouette is normal in size and configuration.      LUNGS:  Decreased left middle and lower lung opacities. Mildly increased  interstitial markings. Blunting of the left costophrenic angle.      ABDOMEN:  No remarkable upper abdominal findings.      BONES:  No acute osseous changes. Right axillary vascular stent.      Impression: 1.  Mild increased interstitial markings, which may represent early  pulmonary edema.  2. Decreased left pleural effusion with left lower and midlung  atelectasis.               MACRO:  None      Signed by: Slim Rodriguez 9/24/2024 3:52 PM  Dictation workstation:   [id number]      Physical Exam    Relevant Results               Assessment/Plan                  Assessment & Plan  Acute disorder of hemodialysis      54 y.o. 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      #ESRD   #Missed dialysis   #HTN  #HFpEF (EF 60-65% 4/2024)   #Troponemia   (resolved)   #Hyerkalemia (resolved)     #T2DM: Stable diet controlled no home meds, continue gabapentin qhs   #COPD: Continue home inhalers, Albuterol PRN   #DVT: continue home apixaban   #HLD: Continue home statin      F: None  E: Replete as needed  N: Renal Regular   GI: PPI  DVT: Home apixaban  CODE STATUS: Full code      Dispo: home tomorrow after HD, still need one more session of HD    50 min             Soniya Ruff MD

## 2024-09-25 NOTE — NURSING NOTE
Report from Sending RN:    Report From: [place]  Recent Surgery of Procedure: No  Baseline Level of Consciousness (LOC): A and O x 4  Oxygen Use: Yes  Type: prn  Diabetic: Yes  Last BP Med Given Day of Dialysis: None  Last Pain Med Given: None  Lab Tests to be Obtained with Dialysis: No  Blood Transfusion to be Given During Dialysis: No  Available IV Access: Yes  Medications to be Administered During Dialysis: No  Continuous IV Infusion Running: No  Restraints on Currently or in the Last 24 Hours: No  Hand-Off Communication: Stable to come to dialysis  Dialysis Catheter Dressing: NA  Last Dressing Change: AVF

## 2024-09-25 NOTE — PROGRESS NOTES
Stacey Haeth  Age: 54 y.o.  MRN: [id number]  Date: 9/25/2024  Location of service: in community    Program Details  Medicaid Community Clinical Case Management  Status: Enrolled  Effective Dates: 10/17/2023 - present  Responsible Staff: NAREN Davidson      Goals Reviewed:  Problem: Anxiety       Goal: Attempts to manage anxiety with help       Priority: High         Goal: Verbalizes ways to manage anxiety       Priority: High         Goal: Implements measures to reduce anxiety       Priority: High        Problem: Financial Stressors       Goal: Assistance with financial concerns         Problem: Kidney Issues       Goal: Improve health to get on kidney transplant list       Priority: High        Problem: Negative Experience, Conflict with, or Distrust of Providers and/or Health System       Goal: Plan to Address Patient Specific Negative Experience, Distrust, or Conflict with Providers and/or Health System       Priority: High        Problem: Risk of Uncoordinated Care       Goal: Care will be Coordinated and Supported by a Multidisciplinary Team of Providers       Priority: High          Summary:  [place] provider [place] [facility] [place]. This provider listened with empathy as patient explained what led to her being admitted. This provider assisted patient with her scheduled appointment with the RD on the team. This provider gave patient a phone .                      NAREN Davidson

## 2024-09-25 NOTE — NURSING NOTE
Report to Receiving RN:    Report To: PONCHO Toure  Time Report Called: 1012  Hand-Off Communication: called, no answer, smart text: Pt completed 3 hrs of HD tx, 2L fluid removed, /94, HR 80, clonidine 0.2 mg administered for incr blood pressure, pt c/o headache, no other issues, pt stable, A/Ox4.   Complications During Treatment: No  Ultrafiltration Treatment: Yes  Medications Administered During Dialysis: Yes  Blood Products Administered During Dialysis: No  Labs Sent During Dialysis: No  Heparin Drip Rate Changes: N/A    Last Updated: 10:14 AM by SAKINA MOELLER

## 2024-09-25 NOTE — PROGRESS NOTES
Pharmacy Medication History Review    Stacey Heath is a 54 y.o. female admitted for Acute disorder of hemodialysis. Pharmacy reviewed the patient's [id number] medications and allergies for accuracy.    Medications ADDED:  N/A  Medications CHANGED:  N/A  Medications REMOVED/NO LONGER TAKING:   Ocean spray     The list below reflects the updated PTA list.   Prior to Admission Medications   Prescriptions Last Dose Informant   B complex-vitamin C-folic acid (Nephrocaps) 1 mg capsule Past Week at Monday Self   Sig: Take 1 capsule by mouth once daily.   NIFEdipine ER (Adalat CC) 90 mg 24 hr tablet Past Week at Monday Self   Sig: Take 1 tablet (90 mg) by mouth once daily in the morning. Take before meals. Do not crush, chew, or split.   SUMAtriptan (Imitrex) 25 mg tablet Past Month Self   Sig: Take 1 tablet (25 mg) by mouth 1 time if needed for migraine. May repeat dose once in 2 hours if no relief.  Do not exceed 2 doses in 24 hours.   albuterol (ProAir HFA) 90 mcg/actuation inhaler 9/24/2024 Self   Sig: Inhale 2 puffs every 4 hours if needed for wheezing or shortness of breath.   albuterol 1.25 mg/3 mL nebulizer solution Past Week at Sunday Self   Sig: Take 3 mL (1.25 mg) by nebulization every 6 hours if needed for wheezing.   apixaban (Eliquis) 5 mg tablet Past Week at Monday Self   Sig: Take 1 tablet (5 mg) by mouth 2 times a day.   aspirin 81 mg EC tablet Past Week at Monday Self   Sig: Take 1 tablet (81 mg) by mouth once daily.   atorvastatin (Lipitor) 80 mg tablet Past Week at Monday Self   Sig: Take 1 tablet (80 mg) by mouth once daily at bedtime.   calcium acetate (Phoslo) 667 mg capsule Past Week at Monday Self   Sig: Take 2 capsules (1,334 mg) by mouth 3 times daily (morning, midday, late afternoon).   carvedilol (Coreg) 25 mg tablet Past Week at Monday Self   Sig: Take 2 tablets (50 mg) by mouth 2 times a day.   cloNIDine (Catapres) 0.2 mg tablet Past Week at Monday Self   Sig: Take 1 tablet (0.2 mg)  by mouth 3 times a day.   epoetin joceline (Epogen,Procrit) 10,000 unit/mL injection  Self   Sig: Inject 1 mL (10,000 Units) under the skin 3 times a week.   fluticasone (Flonase) 50 mcg/actuation nasal spray 9/24/2024 Self   Sig: Administer 2 sprays into each nostril once daily. Shake gently. Before first use, prime pump. After use, clean tip and replace cap.   fluticasone furoate-vilanteroL (Breo Ellipta) 100-25 mcg/dose inhaler Past Week at Sunday Self   Sig: Inhale 1 puff once daily.   gabapentin (Neurontin) 300 mg capsule Past Week at Sunday Self   Sig: Take 1 capsule (300 mg) by mouth once daily at bedtime.   hydrALAZINE (Apresoline) 100 mg tablet Past Week at Monday Self   Sig: Take 1 tablet (100 mg) by mouth 3 times a day.   hydrOXYzine HCL (Atarax) 25 mg tablet Past Week at Monday Self   Sig: Take 1 tablet (25 mg) by mouth every 6 hours if needed for anxiety.   isosorbide mononitrate ER (Imdur) 120 mg 24 hr tablet Past Week at Monday Self   Sig: Take 1 tablet (120 mg) by mouth once daily. Do not crush or chew.   melatonin 5 mg tablet Past Week at Monday Self   Sig: Take 1 tablet (5 mg) by mouth once daily at bedtime.   pantoprazole (ProtoNix) 40 mg EC tablet Past Week at Monday Self   Sig: Take 1 tablet (40 mg) by mouth once daily in the morning. Take before meals. Do not crush, chew, or split.   polyethylene glycol (Glycolax, Miralax) 17 gram/dose powder  Self   Sig: Take 17 g (1 scoop dissolved in liquid) by mouth once daily. Do not start before July 26, 2024.   sodium chloride (Ocean) 0.65 % nasal spray Not Taking Self   Sig: Administer 1 spray into each nostril 4 times a day as needed for congestion.   Patient not taking: Reported on 9/25/2024   torsemide (Demadex) 20 mg tablet Past Week at Monday Self   Sig: Take 2 tablets once daily on non-dialysis days only. Do not take on dialysis days   valsartan (Diovan) 320 mg tablet Past Week at Monday Self   Sig: Take 1 tablet (320 mg) by mouth once daily.     "  Facility-Administered Medications: None        The list below reflects the updated allergy list. Please review each documented allergy for additional clarification and justification.  Allergies  Reviewed by Breanna Phoenix on 9/25/2024        Severity Reactions Comments    Iodine High Hives, Itching, Unknown     Bee Pollen Not Specified Unknown     Bioflavonoids Not Specified Swelling     Citrus And Derivatives Not Specified Unknown     Codeine Not Specified Itching, Hives, Unknown Tolerates percocet   Tolerates percocet Tolerates percocet    Flowers Not Specified Itching     Hydromorphone Not Specified Itching     Shellfish Containing Products Not Specified Swelling SEAFOOD            Patient accepts M2B at discharge.     Sources:   Sources included out patient fill history, OARRS, and patient interview (patient was a moderate med historian. Patient was in and out of sleep during the interview, I had to call her name to wake her up.).    Additional Comments:  Patient appears compliant with her meds.       TERE BIANCHISt. Rita's HospitalVIOLETTE  Pharmacy Technician  09/25/24     Secure Chat preferred   If no response call [phone] or [employer] \"Med Rec\"   "

## 2024-09-25 NOTE — H&P
"Chief Complaint   Patient presents with    Dizziness       HPI  Stacey Heath is a 54 y.o. 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  atient states that earlier this morning she may have taken double up on her blood pressure medications however she is unsure which one.  Patient states she subsequently felt like she was \"drunk\", lightheaded, and dizzy.  Patient eventually went to dialysis where her blood pressure was noted to be 97 systolic which is a lot lower than she is used to.  Given concern for bottom out during dialysis have patient elected to get evaluated in the emergency department.  Patient did endorse feeling nauseous however with no associated emesis.  Patient further denies any current fever, chills, chest pain with activity, abdominal pain, or any recent illnesses or changes to her medications.      ED Course:  Triage Vitals: T 36.9 °C (98.4 °F)  HR 72  /72  RR 16  O2 96 % None (Room air)  Labs: Please see results section of this note     Imaging pertinent impressions: (Please see results section of note for full impression)   CXR: Mild increased interstitial markings, which may represent early pulmonary edema.      Interventions:  s/p Insulin, Calcium gluconate, Amp D50, Tylenol 975 x1   Consults: Nephrology    ROS: A complete review of systems was performed and is otherwise negative except as noted in HPI    Patient History   Past Medical History:   Diagnosis Date    Bacteremia due to methicillin resistant Staphylococcus aureus 07/08/2024    Cardiac/pericardial tamponade ([facility]) 01/20/2024    CHF (congestive heart failure) (Multi)     COPD (chronic obstructive pulmonary disease) (Multi)     Coronary artery disease     Disorder of sweat glands 02/25/2023    Dry eye syndrome of bilateral lacrimal glands 03/07/2017    Dry eyes    ESRD (end " stage renal disease) (Multi)     Essential (primary) hypertension 12/27/2022    Hypertension    History of acute pancreatitis 12/21/2020    History of acute pancreatitis    Low grade squamous intraepithelial lesion (LGSIL) on cervicovaginal cytologic smear 02/25/2023    Migraines     History of migraine    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    Type 2 myocardial infarction (Multi) 01/20/2024     Past Surgical History:   Procedure Laterality Date    APPENDECTOMY  03/07/2017    Appendectomy    CT ABDOMEN ANGIOGRAM W AND/OR WO IV CONTRAST  4/23/2023    CT ABDOMEN ANGIOGRAM W AND/OR WO IV CONTRAST [facility] CT    OTHER SURGICAL HISTORY  03/07/2017    Cystoscopy With Pyeloscopy With Removal Of Calculus    OTHER SURGICAL HISTORY  03/07/2017    Anoscopy For Polyp Removal    OTHER SURGICAL HISTORY  12/08/2021    Arteriovenous fistula creation procedure    OTHER SURGICAL HISTORY  12/08/2021    Dialysis tunneled catheter placement    TUBAL LIGATION  03/07/2017    Tubal Ligation      reports that she has quit smoking. Her smoking use included cigarettes. She has been exposed to tobacco smoke. She has never used smokeless tobacco. She reports that she does not currently use alcohol. She reports that she does not currently use drugs after having used the following drugs: Cocaine and Marijuana.       Allergies  Iodine, Bioflavonoids, Codeine, Flowers, Hydromorphone, and Shellfish containing products    Last Recorded Vitals   /70 (BP Location: Left arm)   Pulse 95   Temp 37.1 °C (98.8 °F)   Resp 16   Wt 52.6 kg (116 lb)   SpO2 96%     Physical Exam  Vitals and nursing note reviewed.   Constitutional:       General: She is not in acute distress.  Eyes:      General: No scleral icterus.     Extraocular Movements: Extraocular movements intact.   Cardiovascular:      Rate and Rhythm: Normal rate and regular rhythm.      Heart sounds: Murmur heard.      Systolic murmur is present.   Pulmonary:      Effort: Pulmonary  effort is normal. No respiratory distress.      Breath sounds: Normal breath sounds.      Comments: On 2L nasal cannula (for comfort )  Abdominal:      General: Abdomen is flat. There is no distension.      Palpations: Abdomen is soft.      Tenderness: There is no abdominal tenderness.   Musculoskeletal:         General: No swelling. Normal range of motion.      Comments: Right AV fistula with palpable thrill   Skin:     General: Skin is warm and dry.   Neurological:      Mental Status: She is alert and oriented to person, place, and time.        Relevant Results  Results for orders placed or performed during the hospital encounter of 09/24/24 (from the past 24 hour(s))   CBC and Auto Differential   Result Value Ref Range    WBC 5.6 4.4 - 11.3 x10*3/uL    nRBC 0.0 0.0 - 0.0 /100 WBCs    RBC 3.29 (L) 4.00 - 5.20 x10*6/uL    Hemoglobin 9.6 (L) 12.0 - 16.0 g/dL    Hematocrit 32.0 (L) 36.0 - 46.0 %    MCV 97 80 - 100 fL    MCH 29.2 26.0 - 34.0 pg    MCHC 30.0 (L) 32.0 - 36.0 g/dL    RDW 18.1 (H) 11.5 - 14.5 %    Platelets 206 150 - 450 x10*3/uL    Neutrophils % 65.6 40.0 - 80.0 %    Immature Granulocytes %, Automated 0.5 0.0 - 0.9 %    Lymphocytes % 13.8 13.0 - 44.0 %    Monocytes % 7.7 2.0 - 10.0 %    Eosinophils % 11.1 0.0 - 6.0 %    Basophils % 1.3 0.0 - 2.0 %    Neutrophils Absolute 3.65 1.20 - 7.70 x10*3/uL    Immature Granulocytes Absolute, Automated 0.03 0.00 - 0.70 x10*3/uL    Lymphocytes Absolute 0.77 (L) 1.20 - 4.80 x10*3/uL    Monocytes Absolute 0.43 0.10 - 1.00 x10*3/uL    Eosinophils Absolute 0.62 0.00 - 0.70 x10*3/uL    Basophils Absolute 0.07 0.00 - 0.10 x10*3/uL   Magnesium   Result Value Ref Range    Magnesium 2.62 (H) 1.60 - 2.40 mg/dL   Phosphorus   Result Value Ref Range    Phosphorus 6.5 (H) 2.5 - 4.9 mg/dL   Comprehensive metabolic panel   Result Value Ref Range    Glucose 150 (H) 74 - 99 mg/dL    Sodium 141 136 - 145 mmol/L    Potassium 5.2 3.5 - 5.3 mmol/L    Chloride 101 98 - 107 mmol/L     Bicarbonate 24 21 - 32 mmol/L    Anion Gap 21 (H) 10 - 20 mmol/L    Urea Nitrogen 72 (H) 6 - 23 mg/dL    Creatinine 10.77 (H) 0.50 - 1.05 mg/dL    eGFR 4 (L) >60 mL/min/1.73m*2    Calcium 9.3 8.6 - 10.6 mg/dL    Albumin 3.8 3.4 - 5.0 g/dL    Alkaline Phosphatase 119 (H) 33 - 110 U/L    Total Protein 6.6 6.4 - 8.2 g/dL    AST 20 9 - 39 U/L    Bilirubin, Total 0.4 0.0 - 1.2 mg/dL    ALT 29 7 - 45 U/L   Blood Gas Venous Full Panel   Result Value Ref Range    POCT pH, Venous 7.39 7.33 - 7.43 pH    POCT pCO2, Venous 40 (L) 41 - 51 mm Hg    POCT pO2, Venous 58 (H) 35 - 45 mm Hg    POCT SO2, Venous 85 (H) 45 - 75 %    POCT Oxy Hemoglobin, Venous 84.3 (H) 45.0 - 75.0 %    POCT Hematocrit Calculated, Venous 31.0 (L) 36.0 - 46.0 %    POCT Sodium, Venous 137 136 - 145 mmol/L    POCT Potassium, Venous 6.0 (H) 3.5 - 5.3 mmol/L    POCT Chloride, Venous 104 98 - 107 mmol/L    POCT Ionized Calicum, Venous 1.19 1.10 - 1.33 mmol/L    POCT Glucose, Venous 151 (H) 74 - 99 mg/dL    POCT Lactate, Venous 0.8 0.4 - 2.0 mmol/L    POCT Base Excess, Venous -0.7 -2.0 - 3.0 mmol/L    POCT HCO3 Calculated, Venous 24.2 22.0 - 26.0 mmol/L    POCT Hemoglobin, Venous 10.2 (L) 12.0 - 16.0 g/dL    POCT Anion Gap, Venous 15.0 10.0 - 25.0 mmol/L    Patient Temperature 37.0 degrees Celsius    FiO2 21 %   Troponin I, High Sensitivity, Initial   Result Value Ref Range    Troponin I, High Sensitivity ([facility]) 47 (H) 0 - 34 ng/L   Potassium   Result Value Ref Range    Potassium 5.2 3.5 - 5.3 mmol/L   POCT GLUCOSE   Result Value Ref Range    POCT Glucose 304 (H) 74 - 99 mg/dL   Troponin, High Sensitivity, 1 Hour   Result Value Ref Range    Troponin I, High Sensitivity ([facility]) 53 (H) 0 - 34 ng/L       XR chest 2 views    Result Date: 9/24/2024  Interpreted By:  Slim Rodriguez, STUDY: XR CHEST 2 VIEWS;  9/24/2024 3:47 pm   INDICATION: Signs/Symptoms:diizzy, missed HD.     COMPARISON: 09/17/2024   ACCESSION NUMBER(S): [id number]   ORDERING CLINICIAN: BRANDY NORRIS    FINDINGS: AP and lateral radiographs of the chest were provided.       CARDIOMEDIASTINAL SILHOUETTE: Cardiomediastinal silhouette is normal in size and configuration.   LUNGS: Decreased left middle and lower lung opacities. Mildly increased interstitial markings. Blunting of the left costophrenic angle.   ABDOMEN: No remarkable upper abdominal findings.   BONES: No acute osseous changes. Right axillary vascular stent.       1.  Mild increased interstitial markings, which may represent early pulmonary edema. 2. Decreased left pleural effusion with left lower and midlung atelectasis.       MACRO: None   Signed by: Slim Rodriguez 9/24/2024 3:52 PM Dictation workstation:   [id number]        Assessment/Plan      54 y.o. 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     #ESRD   #Missed dialysis  - Last dialysis session was Saturday (9/21)  - CXR with concern for early pulmonary edema   - continue TTS HD, discussed importance of compliance   - continue calcium acetate with renal diet, renal MVI   - Wean O2 as able  - Nephrology consulted for inpatient HD management, dialysis in AM    - Telemetry     #HTN  #HFpEF (EF 60-65% 4/2024)  - Patient on multiple BP medications   - BP up trending in ED   - Resume PTA antihypertensive medications   - Continue to monitor, adjust as needed     #Troponemia   - Trop 47 -> 53   - NSR, peaked T-waves, no acute ST elevations, similar to prior ECG   - Likely demand ischemia   - Repeat Trop in AM      #Hyerkalemia (resolved)    - K 6.0 -> 5.2    - s/p Insulin, Calcium gluconate, Amp D50    - Dialysis in the AM   - continue to monitor    #Chronic Medical Conditions   - T2DM: Stable diet controlled no home meds, continue gabapentin qhs   - COPD: Continue home inhalers, Albuterol PRN   - DVT: continue home apixaban   - HLD: Continue home  statin     F: None  E: Replete as needed  N: Renal Regular   GI: PPI  DVT: Home apixaban  CODE STATUS: Full code    A total of >75 minutes was spent on this visit reviewing previous notes including inpatient ED, chart review of ambulatory records in EMR and OSH records in [facility], counseling the patient on substance use cessation, ordering tests and add on labs, medication reconciliation and adjusting meds, and documenting the findings in the note.    Benjamin Najera MD

## 2024-09-25 NOTE — PROGRESS NOTES
Renal Staff HD Note    Patient seen, examined on dialysis   Tolerating treatment without event  O2 per NC 58.3 (51) AVF 2K  192/86 2L    BP Readings from Last 3 Encounters:   09/25/24 (!) 187/71   09/20/24 160/72   08/29/24 150/60     [unfilled]  Lab Results   Component Value Date    CREATININE 10.77 (H) 09/24/2024    BUN 72 (H) 09/24/2024     09/24/2024    K 5.5 (H) 09/25/2024     09/24/2024    CO2 24 09/24/2024     Lab Results   Component Value Date    PTH 1,415.0 (H) 05/31/2024    CALCIUM 9.3 09/24/2024    CAION 1.18 03/14/2023    PHOS 6.5 (H) 09/24/2024     @  Lab Results   Component Value Date    HGB 9.6 (L) 09/24/2024       Continue treatment per submitted orders   IUF tomorrow if still here; clonidine 0.2 x 1

## 2024-09-26 ENCOUNTER — APPOINTMENT (OUTPATIENT)
Dept: DIALYSIS | Facility: HOSPITAL | Age: 54
End: 2024-09-26
Payer: COMMERCIAL

## 2024-09-26 ENCOUNTER — CLINICAL SUPPORT (OUTPATIENT)
Dept: EMERGENCY MEDICINE | Facility: HOSPITAL | Age: 54
End: 2024-09-26
Payer: COMMERCIAL

## 2024-09-26 VITALS
TEMPERATURE: 97.3 F | WEIGHT: 116 LBS | HEART RATE: 86 BPM | HEIGHT: 55 IN | OXYGEN SATURATION: 94 % | DIASTOLIC BLOOD PRESSURE: 78 MMHG | BODY MASS INDEX: 26.85 KG/M2 | RESPIRATION RATE: 16 BRPM | SYSTOLIC BLOOD PRESSURE: 185 MMHG

## 2024-09-26 LAB
ALBUMIN SERPL BCP-MCNC: 3.4 G/DL (ref 3.4–5)
ANION GAP SERPL CALC-SCNC: 14 MMOL/L (ref 10–20)
BUN SERPL-MCNC: 46 MG/DL (ref 6–23)
CALCIUM SERPL-MCNC: 9 MG/DL (ref 8.6–10.6)
CHLORIDE SERPL-SCNC: 101 MMOL/L (ref 98–107)
CO2 SERPL-SCNC: 32 MMOL/L (ref 21–32)
CREAT SERPL-MCNC: 7.46 MG/DL (ref 0.5–1.05)
EGFRCR SERPLBLD CKD-EPI 2021: 6 ML/MIN/1.73M*2
ERYTHROCYTE [DISTWIDTH] IN BLOOD BY AUTOMATED COUNT: 17.5 % (ref 11.5–14.5)
GLUCOSE BLD MANUAL STRIP-MCNC: 146 MG/DL (ref 74–99)
GLUCOSE SERPL-MCNC: 122 MG/DL (ref 74–99)
HCT VFR BLD AUTO: 31.4 % (ref 36–46)
HGB BLD-MCNC: 9.3 G/DL (ref 12–16)
MCH RBC QN AUTO: 29.6 PG (ref 26–34)
MCHC RBC AUTO-ENTMCNC: 29.6 G/DL (ref 32–36)
MCV RBC AUTO: 100 FL (ref 80–100)
NRBC BLD-RTO: 0 /100 WBCS (ref 0–0)
PHOSPHATE SERPL-MCNC: 6.3 MG/DL (ref 2.5–4.9)
PLATELET # BLD AUTO: 171 X10*3/UL (ref 150–450)
POTASSIUM SERPL-SCNC: 5.3 MMOL/L (ref 3.5–5.3)
RBC # BLD AUTO: 3.14 X10*6/UL (ref 4–5.2)
SODIUM SERPL-SCNC: 142 MMOL/L (ref 136–145)
WBC # BLD AUTO: 4.5 X10*3/UL (ref 4.4–11.3)

## 2024-09-26 PROCEDURE — 80069 RENAL FUNCTION PANEL: CPT | Performed by: STUDENT IN AN ORGANIZED HEALTH CARE EDUCATION/TRAINING PROGRAM

## 2024-09-26 PROCEDURE — 93005 ELECTROCARDIOGRAM TRACING: CPT

## 2024-09-26 PROCEDURE — 96376 TX/PRO/DX INJ SAME DRUG ADON: CPT

## 2024-09-26 PROCEDURE — 96375 TX/PRO/DX INJ NEW DRUG ADDON: CPT

## 2024-09-26 PROCEDURE — 82947 ASSAY GLUCOSE BLOOD QUANT: CPT

## 2024-09-26 PROCEDURE — 2500000001 HC RX 250 WO HCPCS SELF ADMINISTERED DRUGS (ALT 637 FOR MEDICARE OP): Performed by: STUDENT IN AN ORGANIZED HEALTH CARE EDUCATION/TRAINING PROGRAM

## 2024-09-26 PROCEDURE — 2500000004 HC RX 250 GENERAL PHARMACY W/ HCPCS (ALT 636 FOR OP/ED): Performed by: STUDENT IN AN ORGANIZED HEALTH CARE EDUCATION/TRAINING PROGRAM

## 2024-09-26 PROCEDURE — G0378 HOSPITAL OBSERVATION PER HR: HCPCS

## 2024-09-26 PROCEDURE — 99239 HOSP IP/OBS DSCHRG MGMT >30: CPT | Performed by: STUDENT IN AN ORGANIZED HEALTH CARE EDUCATION/TRAINING PROGRAM

## 2024-09-26 PROCEDURE — 85027 COMPLETE CBC AUTOMATED: CPT | Performed by: STUDENT IN AN ORGANIZED HEALTH CARE EDUCATION/TRAINING PROGRAM

## 2024-09-26 PROCEDURE — 2500000002 HC RX 250 W HCPCS SELF ADMINISTERED DRUGS (ALT 637 FOR MEDICARE OP, ALT 636 FOR OP/ED): Performed by: STUDENT IN AN ORGANIZED HEALTH CARE EDUCATION/TRAINING PROGRAM

## 2024-09-26 PROCEDURE — 36415 COLL VENOUS BLD VENIPUNCTURE: CPT | Performed by: STUDENT IN AN ORGANIZED HEALTH CARE EDUCATION/TRAINING PROGRAM

## 2024-09-26 PROCEDURE — 90937 HEMODIALYSIS REPEATED EVAL: CPT

## 2024-09-26 PROCEDURE — 90935 HEMODIALYSIS ONE EVALUATION: CPT | Performed by: INTERNAL MEDICINE

## 2024-09-26 RX ORDER — LABETALOL HYDROCHLORIDE 5 MG/ML
10 INJECTION, SOLUTION INTRAVENOUS ONCE
Status: COMPLETED | OUTPATIENT
Start: 2024-09-26 | End: 2024-09-26

## 2024-09-26 RX ORDER — DIPHENHYDRAMINE HYDROCHLORIDE 50 MG/ML
25 INJECTION INTRAMUSCULAR; INTRAVENOUS ONCE
Status: COMPLETED | OUTPATIENT
Start: 2024-09-26 | End: 2024-09-26

## 2024-09-26 RX ORDER — METOCLOPRAMIDE HYDROCHLORIDE 5 MG/ML
5 INJECTION INTRAMUSCULAR; INTRAVENOUS ONCE
Status: COMPLETED | OUTPATIENT
Start: 2024-09-26 | End: 2024-09-26

## 2024-09-26 ASSESSMENT — PAIN - FUNCTIONAL ASSESSMENT
PAIN_FUNCTIONAL_ASSESSMENT: NO/DENIES PAIN
PAIN_FUNCTIONAL_ASSESSMENT: NO/DENIES PAIN

## 2024-09-26 ASSESSMENT — PAIN SCALES - GENERAL
PAINLEVEL_OUTOF10: 5 - MODERATE PAIN
PAINLEVEL_OUTOF10: 9
PAINLEVEL_OUTOF10: 0 - NO PAIN
PAINLEVEL_OUTOF10: 0 - NO PAIN

## 2024-09-26 NOTE — NURSING NOTE
Report from Sending RN:    Report From: Lo  Recent Surgery of Procedure: No  Baseline Level of Consciousness (LOC): A and O x 4  Oxygen Use: Yes  Type: 2 LPM prn  Diabetic: No  Last BP Med Given Day of Dialysis: Please give BP meds or send them with pt so we can give due to hypertensive issue she had yesterday in dialysis.  Last Pain Med Given: None  Lab Tests to be Obtained with Dialysis: No  Blood Transfusion to be Given During Dialysis: No  Available IV Access: Yes  Medications to be Administered During Dialysis: No  Continuous IV Infusion Running: No  Restraints on Currently or in the Last 24 Hours: No  Hand-Off Communication: Stable to come to dialysis,   Dialysis Catheter Dressing: AVF  Last Dressing Change: NA

## 2024-09-26 NOTE — PROGRESS NOTES
Stacey Heath is a 54 y.o. female on day 0 of admission presenting with Acute disorder of hemodialysis.    SW met with patient at bedside to determine if patient has a current chair time. SW called [facility], who is in patient's chart for multiple locations - confirmed patient is NOT active, but their case management called patient this morning to check-in and offer assistance.  SW spoke with [employer], patient's Medicare provider, who also advised that patient is not currently active with any dialysis center. Patient received dialysis at [facility] today.  Patient is boarding in the ED, SW to follow up with patient in the AM to place referral for FOC - Dialysis Chair Time. Patient is admitted.

## 2024-09-26 NOTE — DISCHARGE SUMMARY
Discharge Diagnosis  missing hemodialysis    Issues Requiring Follow-Up  PCP    Discharge Meds     Medication List      CHANGE how you take these medications     * albuterol 90 mcg/actuation inhaler; Commonly known as: ProAir HFA;   Inhale 2 puffs every 4 hours if needed for wheezing or shortness of   breath.; What changed: Another medication with the same name was changed.   Make sure you understand how and when to take each.   * albuterol 1.25 mg/3 mL nebulizer solution; Take 3 mL (1.25 mg) by   nebulization every 6 hours if needed for wheezing or shortness of breath.;   What changed: reasons to take this   hydrOXYzine HCL 25 mg tablet; Commonly known as: Atarax; Take 1 tablet   (25 mg) by mouth every 6 hours if needed for anxiety, allergies or   itching.; What changed: reasons to take this  * This list has 2 medication(s) that are the same as other medications   prescribed for you. Read the directions carefully, and ask your doctor or   other care provider to review them with you.     CONTINUE taking these medications     apixaban 5 mg tablet; Commonly known as: Eliquis; Take 1 tablet (5 mg)   by mouth 2 times a day.   aspirin 81 mg EC tablet; Take 1 tablet (81 mg) by mouth once daily.   atorvastatin 80 mg tablet; Commonly known as: Lipitor; Take 1 tablet (80   mg) by mouth once daily at bedtime.   Breo Ellipta 100-25 mcg/dose inhaler; Generic drug: fluticasone   furoate-vilanteroL; Inhale 1 puff once daily.   calcium acetate 667 mg capsule; Commonly known as: Phoslo; Take 2   capsules (1,334 mg) by mouth 3 times daily (morning, midday, late   afternoon).   carvedilol 25 mg tablet; Commonly known as: Coreg; Take 2 tablets (50   mg) by mouth 2 times a day.   cloNIDine 0.2 mg tablet; Commonly known as: Catapres; Take 1 tablet (0.2   mg) by mouth 3 times a day.   epoetin joceline 10,000 unit/mL injection; Commonly known as:   Epogen,Procrit; Inject 1 mL (10,000 Units) under the skin 3 times a week.   fluticasone 50  mcg/actuation nasal spray; Commonly known as: Flonase;   Administer 2 sprays into each nostril once daily. Shake gently. Before   first use, prime pump. After use, clean tip and replace cap.   gabapentin 300 mg capsule; Commonly known as: Neurontin; Take 1 capsule   (300 mg) by mouth once daily at bedtime.   hydrALAZINE 100 mg tablet; Commonly known as: Apresoline; Take 1 tablet   (100 mg) by mouth 3 times a day.   isosorbide mononitrate  mg 24 hr tablet; Commonly known as: Imdur;   Take 1 tablet (120 mg) by mouth once daily. Do not crush or chew.   melatonin 5 mg tablet; Take 1 tablet (5 mg) by mouth once daily at   bedtime.   NIFEdipine ER 90 mg 24 hr tablet; Commonly known as: Adalat CC; Take 1   tablet (90 mg) by mouth once daily in the morning. Take before meals. Do   not crush, chew, or split.   pantoprazole 40 mg EC tablet; Commonly known as: ProtoNix; Take 1 tablet   (40 mg) by mouth once daily in the morning. Take before meals. Do not   crush, chew, or split.   polyethylene glycol 17 gram/dose powder; Commonly known as: Glycolax,   Miralax; Take 17 g (1 scoop dissolved in liquid) by mouth once daily. Do   not start before July 26, 2024.   SUMAtriptan 25 mg tablet; Commonly known as: Imitrex; Take 1 tablet (25   mg) by mouth 1 time if needed for migraine. May repeat dose once in 2   hours if no relief.  Do not exceed 2 doses in 24 hours.   torsemide 20 mg tablet; Commonly known as: Demadex; Take 2 tablets once   daily on non-dialysis days only. Do not take on dialysis days   valsartan 320 mg tablet; Commonly known as: Diovan; Take 1 tablet (320   mg) by mouth once daily.   vitamin B complex-vitamin C-folic acid 1 mg capsule; Commonly known as:   Nephrocaps; Take 1 capsule by mouth once daily.     STOP taking these medications     Deep Sea Nasal 0.65 % nasal spray; Generic drug: sodium chloride       Test Results Pending At Discharge  Pending Labs       Order Current Status    Metanephrines Plasma In  process            Hospital Course       54 y.o. 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       Issues Requiring Follow-Up  Follow-up serum metanephrines.  Follow-up with vascular medicine to discuss duration of anticoagulation for the brachial DVT (4/14/24) given patient's high bleeding risk.     35 min     Pertinent Physical Exam At Time of Discharge  Physical Exam    Outpatient Follow-Up  Future Appointments   Date Time Provider Department Center   10/4/2024  3:15 PM [facility] CAIC CT 1 CMCCAICCT [facility]   10/10/2024 12:30 PM Bess Hurst RD [id number] [facility]   11/6/2024 11:00 AM LILLY Leahy-CNP [id number] [facility]   11/8/2024 11:00 AM Sherri Gastelum MD [id number] Academic   1/17/2025 11:40 AM Judy Alcaraz MD [id number] Academic   1/27/2025  4:00 PM Makenna Barraza MD [id number] Academic         Soniya Ruff MD

## 2024-09-26 NOTE — PROGRESS NOTES
Stacey Heath is a 54 y.o. female on day 0 of admission presenting with Acute disorder of hemodialysis.    Patient received dialysis here at the hospital. SW and patient discussed current treatment regimen for dialysis. Patient states she has a Tues/Thurs/Sat 12PM arrival, 1230-330p chair time at [facility] location. SW confirmed with facility. Per facility, patient was transferred to [facility] from another location on August 17 after being without dialysis since August 9. Patient had dialisys 8/21 and her attendance been hit or miss they said. Patient typically no call/no shows, but has called to cancel once.    Patient's SW at [facility], Julio, called back to report he is relatively new working with patient due to her sporadic attendance, but he will help her obtain cab vouchers for transportation via the [facility]. This is because patient's current Medicare, [facility] does not cover any transportation benefits, and is also OUT of network with [facility]. Per Julio, patient's secondary Medicaid is in-network. Julio advises that he will continue to assist patient with this as well. Patient's next chair time is Saturday, 9/28 at 12PM. Patient states that her children or  will take her. SW and patient discussed importance of going to this appointment.    SW updated Attending MD. Patient's daughter picked her up after work around 330PM. Patient returning home.

## 2024-09-26 NOTE — NURSING NOTE
Report to Receiving RN:    Report To: PONCHO Blanchard  Time Report Called: [id number]  Hand-Off Communication: Pt completed 3 hrs HD tx, 1.8L fluid removed, /91, HR 85, no issues, pt stable, A/O.   Complications During Treatment: No  Ultrafiltration Treatment: Yes  Medications Administered During Dialysis: No  Blood Products Administered During Dialysis: No  Labs Sent During Dialysis: No  Heparin Drip Rate Changes: N/A    Last Updated: 11:33 AM by SAKINA MOELLER

## 2024-09-26 NOTE — DISCHARGE SUMMARY
Discharge Diagnosis  Hypertensive emergency    Issues Requiring Follow-Up  N/A    Discharge Meds     Medication List      CONTINUE taking these medications     epoetin joceline 10,000 unit/mL injection; Commonly known as:   Epogen,Procrit; Inject 1 mL (10,000 Units) under the skin 3 times a week.   fluticasone 50 mcg/actuation nasal spray; Commonly known as: Flonase;   Administer 2 sprays into each nostril once daily. Shake gently. Before   first use, prime pump. After use, clean tip and replace cap.     STOP taking these medications     carvedilol 25 mg tablet; Commonly known as: Coreg   cloNIDine 0.2 mg tablet; Commonly known as: Catapres   hydrALAZINE 100 mg tablet; Commonly known as: Apresoline   isosorbide mononitrate  mg 24 hr tablet; Commonly known as: Imdur   NIFEdipine ER 90 mg 24 hr tablet; Commonly known as: Adalat CC   polyethylene glycol 17 gram/dose powder; Commonly known as: Glycolax,   Miralax   valsartan 320 mg tablet; Commonly known as: Diovan       Test Results Pending At Discharge  N/A    Hospital Course    Stacey Heath is a 54 y.o. 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 In the ED, pt was hypertensive and tachypneic with significant pulmonary edema on CXR ultimately requiring bipap. Pt's BP controlled with home antihypertensives and one additional IV hydralazine in ED and pt then transitioned from bipap to 1.5L NC. Given that pt reports taking all of her medications as prescribed and received a full session of HD on 8/10 with no recently missed sessions, pt likely had hypertensive emergency potentially as a result of receiving news that her close friend had passed away. Pt now being  admitted to medicine for further management of hypertensive emergency. We restarted home anti-hypertensive medications with improvement in BP. SOB improved after improvement in BP and HD session. Will discharge home to resume regimen.    Pertinent Physical Exam At Time of Discharge    GENERAL APPEARANCE: A&Ox3, appears in no acute distress  HEAD: normocephalic, atraumatic  THROAT: Oral cavity and pharynx normal. No inflammation, swelling, exudate, or lesions.  NECK: Neck supple, non-tender without lymphadenopathy, masses or thyromegaly.  CARDIAC: Normal S1 and S2. No S3, S4 or murmurs. Rhythm is regular. There is no peripheral edema, cyanosis or pallor. Extremities are warm and well perfused. No carotid bruits.  LUNGS: Clear to auscultation bilaterally without rales, rhonchi, wheezing or diminished breath sounds.  ABDOMEN: Positive bowel sounds. Soft, nondistended, nontender. No guarding or rebound. No masses.  EXTREMITIES: No significant deformity or joint abnormality. No edema. Peripheral pulses intact. No varicosities.  SKIN: Skin normal color, texture and turgor with no lesions or rash  PSYCHIATRIC: oriented to person, place, and time, good judgement and reason, without hallucinations, abnormal affect or abnormal behaviors during the examination. Patient is not suicidal.        Outpatient Follow-Up  Future Appointments   Date Time Provider Department Center   9/26/2024  8:00 AM HD ISO/TELE CHAIR 2 [facility]   10/4/2024  3:15 PM [facility] CT 1 CMCCAICCT [facility]   10/10/2024 12:30 PM Bess Hurst RD UHACOMgmt [facility]   11/6/2024 11:00 AM LILLY Leahy-CNP [id number] [facility]   11/8/2024 11:00 AM Sherri Gastelum MD [id number] [facility]   1/17/2025 11:40 AM Judy Alcaraz MD [id number] [facility]   1/27/2025  4:00 PM Makenna Barraza MD [id number] [facility]         Lyndon Tubbs MD

## 2024-09-26 NOTE — PROGRESS NOTES
Renal Staff HD Note    Patient seen, examined on dialysis   Tolerating treatment without event  132/73  AVF 2K 51 DW 57.2 pre 3L fluid removal    BP Readings from Last 3 Encounters:   09/26/24 (!) 127/93   09/20/24 160/72   08/29/24 150/60     [unfilled]  Lab Results   Component Value Date    CREATININE 7.46 (H) 09/26/2024    BUN 46 (H) 09/26/2024     09/26/2024    K 5.3 09/26/2024     09/26/2024    CO2 32 09/26/2024     Lab Results   Component Value Date    PTH 1,415.0 (H) 05/31/2024    CALCIUM 9.0 09/26/2024    CAION 1.18 03/14/2023    PHOS 6.3 (H) 09/26/2024     @  Lab Results   Component Value Date    HGB 9.3 (L) 09/26/2024       Continue treatment per submitted orders  IUF tomorrow to try to achieve DW  Pls have nutrition meet with patient to reinforce low Na diet, hidden Na in foods

## 2024-09-27 ENCOUNTER — DOCUMENTATION (OUTPATIENT)
Dept: BEHAVIORAL HEALTH | Facility: CLINIC | Age: 54
End: 2024-09-27
Payer: COMMERCIAL

## 2024-09-29 ENCOUNTER — HOSPITAL ENCOUNTER (EMERGENCY)
Facility: HOSPITAL | Age: 54
Discharge: HOME | End: 2024-09-29
Attending: EMERGENCY MEDICINE
Payer: COMMERCIAL

## 2024-09-29 VITALS
HEIGHT: 55 IN | SYSTOLIC BLOOD PRESSURE: 119 MMHG | RESPIRATION RATE: 16 BRPM | WEIGHT: 116 LBS | DIASTOLIC BLOOD PRESSURE: 63 MMHG | HEART RATE: 78 BPM | BODY MASS INDEX: 26.85 KG/M2 | TEMPERATURE: 98.7 F | OXYGEN SATURATION: 97 %

## 2024-09-29 DIAGNOSIS — T82.838A HEMORRHAGE OF ARTERIOVENOUS FISTULA, INITIAL ENCOUNTER (CMS-HCC): Primary | ICD-10-CM

## 2024-09-29 LAB
ABO GROUP (TYPE) IN BLOOD: NORMAL
ALBUMIN SERPL BCP-MCNC: 4.3 G/DL (ref 3.4–5)
ALP SERPL-CCNC: 128 U/L (ref 33–110)
ALT SERPL W P-5'-P-CCNC: 20 U/L (ref 7–45)
ANION GAP SERPL CALC-SCNC: 17 MMOL/L (ref 10–20)
ANNOTATION COMMENT IMP: NORMAL
ANTIBODY SCREEN: NORMAL
APTT PPP: 48 SECONDS (ref 27–38)
AST SERPL W P-5'-P-CCNC: 17 U/L (ref 9–39)
BASOPHILS # BLD AUTO: 0.06 X10*3/UL (ref 0–0.1)
BASOPHILS NFR BLD AUTO: 1.3 %
BILIRUB SERPL-MCNC: 0.5 MG/DL (ref 0–1.2)
BUN SERPL-MCNC: 47 MG/DL (ref 6–23)
CALCIUM SERPL-MCNC: 10.3 MG/DL (ref 8.6–10.6)
CHLORIDE SERPL-SCNC: 96 MMOL/L (ref 98–107)
CO2 SERPL-SCNC: 30 MMOL/L (ref 21–32)
CREAT SERPL-MCNC: 6.81 MG/DL (ref 0.5–1.05)
EGFRCR SERPLBLD CKD-EPI 2021: 7 ML/MIN/1.73M*2
EOSINOPHIL # BLD AUTO: 0.52 X10*3/UL (ref 0–0.7)
EOSINOPHIL NFR BLD AUTO: 11.4 %
ERYTHROCYTE [DISTWIDTH] IN BLOOD BY AUTOMATED COUNT: 17 % (ref 11.5–14.5)
GLUCOSE SERPL-MCNC: 141 MG/DL (ref 74–99)
HCT VFR BLD AUTO: 33.6 % (ref 36–46)
HGB BLD-MCNC: 10.3 G/DL (ref 12–16)
IMM GRANULOCYTES # BLD AUTO: 0.01 X10*3/UL (ref 0–0.7)
IMM GRANULOCYTES NFR BLD AUTO: 0.2 % (ref 0–0.9)
INR PPP: 1.9 (ref 0.9–1.1)
LYMPHOCYTES # BLD AUTO: 0.89 X10*3/UL (ref 1.2–4.8)
LYMPHOCYTES NFR BLD AUTO: 19.5 %
MAGNESIUM SERPL-MCNC: 2.28 MG/DL (ref 1.6–2.4)
MCH RBC QN AUTO: 28.6 PG (ref 26–34)
MCHC RBC AUTO-ENTMCNC: 30.7 G/DL (ref 32–36)
MCV RBC AUTO: 93 FL (ref 80–100)
METANEPHS SERPL-SCNC: 0.2 NMOL/L (ref 0–0.49)
MONOCYTES # BLD AUTO: 0.42 X10*3/UL (ref 0.1–1)
MONOCYTES NFR BLD AUTO: 9.2 %
NEUTROPHILS # BLD AUTO: 2.67 X10*3/UL (ref 1.2–7.7)
NEUTROPHILS NFR BLD AUTO: 58.4 %
NORMETANEPH FREE SERPL-SCNC: 0.13 NMOL/L (ref 0–0.89)
NRBC BLD-RTO: 0 /100 WBCS (ref 0–0)
PLATELET # BLD AUTO: 206 X10*3/UL (ref 150–450)
POTASSIUM SERPL-SCNC: 4.2 MMOL/L (ref 3.5–5.3)
PROT SERPL-MCNC: 7.7 G/DL (ref 6.4–8.2)
PROTHROMBIN TIME: 22.1 SECONDS (ref 9.8–12.8)
RBC # BLD AUTO: 3.6 X10*6/UL (ref 4–5.2)
RH FACTOR (ANTIGEN D): NORMAL
SODIUM SERPL-SCNC: 139 MMOL/L (ref 136–145)
WBC # BLD AUTO: 4.6 X10*3/UL (ref 4.4–11.3)

## 2024-09-29 PROCEDURE — 85025 COMPLETE CBC W/AUTO DIFF WBC: CPT

## 2024-09-29 PROCEDURE — 85610 PROTHROMBIN TIME: CPT

## 2024-09-29 PROCEDURE — 99284 EMERGENCY DEPT VISIT MOD MDM: CPT | Performed by: EMERGENCY MEDICINE

## 2024-09-29 PROCEDURE — 83735 ASSAY OF MAGNESIUM: CPT

## 2024-09-29 PROCEDURE — 36415 COLL VENOUS BLD VENIPUNCTURE: CPT

## 2024-09-29 PROCEDURE — 80053 COMPREHEN METABOLIC PANEL: CPT

## 2024-09-29 PROCEDURE — 86901 BLOOD TYPING SEROLOGIC RH(D): CPT

## 2024-09-29 PROCEDURE — 99283 EMERGENCY DEPT VISIT LOW MDM: CPT

## 2024-09-29 ASSESSMENT — PAIN SCALES - GENERAL: PAINLEVEL_OUTOF10: 0 - NO PAIN

## 2024-09-29 ASSESSMENT — LIFESTYLE VARIABLES
HAVE PEOPLE ANNOYED YOU BY CRITICIZING YOUR DRINKING: NO
EVER HAD A DRINK FIRST THING IN THE MORNING TO STEADY YOUR NERVES TO GET RID OF A HANGOVER: NO
HAVE YOU EVER FELT YOU SHOULD CUT DOWN ON YOUR DRINKING: NO
TOTAL SCORE: 0
EVER FELT BAD OR GUILTY ABOUT YOUR DRINKING: NO

## 2024-09-29 ASSESSMENT — COLUMBIA-SUICIDE SEVERITY RATING SCALE - C-SSRS
2. HAVE YOU ACTUALLY HAD ANY THOUGHTS OF KILLING YOURSELF?: NO
4. HAVE YOU HAD THESE THOUGHTS AND HAD SOME INTENTION OF ACTING ON THEM?: NO
5. HAVE YOU STARTED TO WORK OUT OR WORKED OUT THE DETAILS OF HOW TO KILL YOURSELF? DO YOU INTEND TO CARRY OUT THIS PLAN?: NO
6. HAVE YOU EVER DONE ANYTHING, STARTED TO DO ANYTHING, OR PREPARED TO DO ANYTHING TO END YOUR LIFE?: NO
1. IN THE PAST MONTH, HAVE YOU WISHED YOU WERE DEAD OR WISHED YOU COULD GO TO SLEEP AND NOT WAKE UP?: NO

## 2024-09-29 ASSESSMENT — PAIN - FUNCTIONAL ASSESSMENT: PAIN_FUNCTIONAL_ASSESSMENT: 0-10

## 2024-09-29 NOTE — ED PROVIDER NOTES
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  Patient had a full session of dialysis yesterday and states that ended at 3 PM which started having bleeding from the fistula access site.  Patient states that the slow oozing that has not stopped.  Patient states feeling mildly lightheaded.  Patient states she soaked through 4 pads.  Physical Exam   Triage vitals:  T 36.1 °C (96.9 °F)  HR 82  /77  RR 16  O2 96 % None (Room air)    Physical Exam  Constitutional:       Appearance: Normal appearance.   HENT:      Head: Normocephalic.   Eyes:      Extraocular Movements: Extraocular movements intact.   Cardiovascular:      Rate and Rhythm: Normal rate.   Pulmonary:      Effort: Pulmonary effort is normal.   Abdominal:      General: Abdomen is flat.   Musculoskeletal:         General: Normal range of motion.      Cervical back: Normal range of motion.   Skin:     General: Skin is warm.      Comments: Right upper extremity fistula, slow oozing from access site, no arterial bleed, palpable thrill, no signs of erythema, cellulitis, infection   Neurological:      Mental Status: She is alert. Mental status is at baseline.   Psychiatric:         Mood and Affect: Mood normal.         Behavior: Behavior normal.          Medical Decision Making & ED Course   Medical Decision Making:  Patient is a 54-year-old female presented to the ED due to bleeding fistula.  On arrival patient was hemodynamically stable patient was seen to have a right upper extremity fistula with slow oozing from access site there is no signs of an arterial bleed and there is a palpable thrill on exam.  There is no concern for infection as there is  no erythema or overlying cellulitis patient is afebrile.  Patient slowly bleeding from the access site.  Quick clot was placed on the site with bandaging.  Labs were obtained and reevaluation showed no bleeding at the fistula site anymore.  Patient hemoglobin was stable and patient was discharged.  ----           EKG Independent Interpretation: EKG not obtained        The patient was discussed with the following consultants/services: None           Disposition   As a result of the work-up, the patient was discharged home.  she was informed of her diagnosis and instructed to come back with any concerns or worsening of condition.  she and was agreeable to the plan as discussed above.  she was given the opportunity to ask questions.  All of the patient's questions were answered.    Procedures   Procedures    Patient seen and discussed with ED attending physician.    Sara Brink MD  Emergency Medicine       Sara Brink MD  Resident  09/29/24 2596       Sara Brink MD  Resident  09/29/24 9107

## 2024-09-29 NOTE — ED TRIAGE NOTES
Patient fistula site bleeding since yesterdays dialysis treatment, patient has had to change dressing multiple times. Patient states that she feels lightheaded. On blood thinners. No other complaints. Up to date on dialysis treatments

## 2024-09-30 NOTE — PROGRESS NOTES
Stacey Heath  Age: 54 y.o.  MRN: [id number]  Date: 9/27/2024  Location of service: in community    Program Details  Medicaid Community Clinical Case Management  Status: Enrolled  Effective Dates: 10/17/2023 - present  Responsible Staff: NARNE Davidson      Goals Reviewed:  Problem: Risk of Uncoordinated Care       Goal: Care will be Coordinated and Supported by a Multidisciplinary Team of Providers       Priority: High          Summary:  This writer met with patient in her home for a community visit.  Patient discusses the difficulties she had while she was in the ED. She states she was in the ED the entire time she was in the hospital.   Patient states the hospital was confused and thought she wasn't going to dialysis because they had the wrong dialysis center.  Patient discusses her frustrations with not being able to get in touch with [facility].   Patient states she wants to find a part-time work from home job. This writer assists patient with navigating [employer].    Billable:  Patient states she was in the hospital because she accidentally took too many of her BP meds. Patient states she was able to refill her weekly pill container correctly.   Patient is still thinking about peritoneal dialysis. This writer provides education.    Appointment start time: 1026  Appointment completion time: 1132  Total time spent with patient (in minutes): 66  Non-Billable Time: 50  Billable Time Total: 16    Roberta Gallego RN

## 2024-10-01 ENCOUNTER — APPOINTMENT (OUTPATIENT)
Dept: RADIOLOGY | Facility: HOSPITAL | Age: 54
End: 2024-10-01
Payer: COMMERCIAL

## 2024-10-01 ENCOUNTER — HOSPITAL ENCOUNTER (OUTPATIENT)
Facility: HOSPITAL | Age: 54
Setting detail: OBSERVATION
Discharge: HOME | End: 2024-10-05
Attending: EMERGENCY MEDICINE | Admitting: NURSE PRACTITIONER
Payer: COMMERCIAL

## 2024-10-01 ENCOUNTER — APPOINTMENT (OUTPATIENT)
Dept: DIALYSIS | Facility: HOSPITAL | Age: 54
End: 2024-10-01
Payer: COMMERCIAL

## 2024-10-01 ENCOUNTER — CLINICAL SUPPORT (OUTPATIENT)
Dept: EMERGENCY MEDICINE | Facility: HOSPITAL | Age: 54
End: 2024-10-01
Payer: COMMERCIAL

## 2024-10-01 DIAGNOSIS — I16.0 HYPERTENSIVE URGENCY: ICD-10-CM

## 2024-10-01 DIAGNOSIS — I16.1 HYPERTENSIVE EMERGENCY: Primary | ICD-10-CM

## 2024-10-01 DIAGNOSIS — I50.31 ACUTE DIASTOLIC CHF (CONGESTIVE HEART FAILURE): ICD-10-CM

## 2024-10-01 DIAGNOSIS — I1A.0 RESISTANT HYPERTENSION: ICD-10-CM

## 2024-10-01 DIAGNOSIS — J81.0 FLASH PULMONARY EDEMA: ICD-10-CM

## 2024-10-01 DIAGNOSIS — I10 ESSENTIAL HYPERTENSION: ICD-10-CM

## 2024-10-01 PROBLEM — I50.1 ACUTE PULMONARY EDEMA WITH HEART DISEASE: Status: RESOLVED | Noted: 2023-04-18 | Resolved: 2024-10-01

## 2024-10-01 PROBLEM — T80.90XA: Status: RESOLVED | Noted: 2024-09-24 | Resolved: 2024-10-01

## 2024-10-01 PROBLEM — E87.70 HYPERVOLEMIA, UNSPECIFIED HYPERVOLEMIA TYPE: Status: RESOLVED | Noted: 2024-08-08 | Resolved: 2024-10-01

## 2024-10-01 PROBLEM — I31.39 OTHER PERICARDIAL EFFUSION (NONINFLAMMATORY) (HHS-HCC): Status: RESOLVED | Noted: 2023-02-25 | Resolved: 2024-10-01

## 2024-10-01 PROBLEM — N28.9 NEPHROPATHY: Status: RESOLVED | Noted: 2021-05-14 | Resolved: 2024-10-01

## 2024-10-01 PROBLEM — L74.9 SWEATING ABNORMALITY: Status: RESOLVED | Noted: 2023-02-25 | Resolved: 2024-10-01

## 2024-10-01 PROBLEM — R87.612 LOW GRADE SQUAMOUS INTRAEPITH LESION ON CYTOLOGIC SMEAR CERVIX (LGSIL): Status: RESOLVED | Noted: 2023-02-25 | Resolved: 2024-10-01

## 2024-10-01 LAB
ALBUMIN SERPL BCP-MCNC: 3.9 G/DL (ref 3.4–5)
ALP SERPL-CCNC: 114 U/L (ref 33–110)
ALT SERPL W P-5'-P-CCNC: 16 U/L (ref 7–45)
ANION GAP BLDV CALCULATED.4IONS-SCNC: 16 MMOL/L (ref 10–25)
ANION GAP SERPL CALC-SCNC: 19 MMOL/L (ref 10–20)
AST SERPL W P-5'-P-CCNC: 16 U/L (ref 9–39)
ATRIAL RATE: 93 BPM
BASE EXCESS BLDV CALC-SCNC: 0.3 MMOL/L (ref -2–3)
BASOPHILS # BLD AUTO: 0.08 X10*3/UL (ref 0–0.1)
BASOPHILS NFR BLD AUTO: 1.1 %
BILIRUB SERPL-MCNC: 0.5 MG/DL (ref 0–1.2)
BNP SERPL-MCNC: 1562 PG/ML (ref 0–99)
BODY TEMPERATURE: 37 DEGREES CELSIUS
BUN SERPL-MCNC: 77 MG/DL (ref 6–23)
CA-I BLDV-SCNC: 1.13 MMOL/L (ref 1.1–1.33)
CALCIUM SERPL-MCNC: 9.3 MG/DL (ref 8.6–10.6)
CARDIAC TROPONIN I PNL SERPL HS: 40 NG/L (ref 0–34)
CHLORIDE BLDV-SCNC: 102 MMOL/L (ref 98–107)
CHLORIDE SERPL-SCNC: 101 MMOL/L (ref 98–107)
CO2 SERPL-SCNC: 26 MMOL/L (ref 21–32)
CREAT SERPL-MCNC: 9.48 MG/DL (ref 0.5–1.05)
EGFRCR SERPLBLD CKD-EPI 2021: 5 ML/MIN/1.73M*2
EOSINOPHIL # BLD AUTO: 0.66 X10*3/UL (ref 0–0.7)
EOSINOPHIL NFR BLD AUTO: 8.7 %
ERYTHROCYTE [DISTWIDTH] IN BLOOD BY AUTOMATED COUNT: 17.2 % (ref 11.5–14.5)
GLUCOSE BLDV-MCNC: 98 MG/DL (ref 74–99)
GLUCOSE SERPL-MCNC: 99 MG/DL (ref 74–99)
HCO3 BLDV-SCNC: 26 MMOL/L (ref 22–26)
HCT VFR BLD AUTO: 31.6 % (ref 36–46)
HCT VFR BLD EST: 29 % (ref 36–46)
HGB BLD-MCNC: 9.5 G/DL (ref 12–16)
HGB BLDV-MCNC: 9.7 G/DL (ref 12–16)
IMM GRANULOCYTES # BLD AUTO: 0.03 X10*3/UL (ref 0–0.7)
IMM GRANULOCYTES NFR BLD AUTO: 0.4 % (ref 0–0.9)
LACTATE BLDV-SCNC: 0.6 MMOL/L (ref 0.4–2)
LACTATE SERPL-SCNC: 1 MMOL/L (ref 0.4–2)
LYMPHOCYTES # BLD AUTO: 0.95 X10*3/UL (ref 1.2–4.8)
LYMPHOCYTES NFR BLD AUTO: 12.5 %
MCH RBC QN AUTO: 29 PG (ref 26–34)
MCHC RBC AUTO-ENTMCNC: 30.1 G/DL (ref 32–36)
MCV RBC AUTO: 96 FL (ref 80–100)
MONOCYTES # BLD AUTO: 0.44 X10*3/UL (ref 0.1–1)
MONOCYTES NFR BLD AUTO: 5.8 %
NEUTROPHILS # BLD AUTO: 5.41 X10*3/UL (ref 1.2–7.7)
NEUTROPHILS NFR BLD AUTO: 71.5 %
NRBC BLD-RTO: 0 /100 WBCS (ref 0–0)
OXYHGB MFR BLDV: 79.3 % (ref 45–75)
P AXIS: 58 DEGREES
P OFFSET: 192 MS
P ONSET: 143 MS
PCO2 BLDV: 46 MM HG (ref 41–51)
PH BLDV: 7.36 PH (ref 7.33–7.43)
PLATELET # BLD AUTO: 211 X10*3/UL (ref 150–450)
PO2 BLDV: 55 MM HG (ref 35–45)
POTASSIUM BLDV-SCNC: 5.4 MMOL/L (ref 3.5–5.3)
POTASSIUM SERPL-SCNC: 5.2 MMOL/L (ref 3.5–5.3)
PR INTERVAL: 144 MS
PROT SERPL-MCNC: 7.3 G/DL (ref 6.4–8.2)
Q ONSET: 215 MS
QRS COUNT: 15 BEATS
QRS DURATION: 84 MS
QT INTERVAL: 346 MS
QTC CALCULATION(BAZETT): 430 MS
QTC FREDERICIA: 400 MS
R AXIS: 41 DEGREES
RBC # BLD AUTO: 3.28 X10*6/UL (ref 4–5.2)
SAO2 % BLDV: 80 % (ref 45–75)
SODIUM BLDV-SCNC: 139 MMOL/L (ref 136–145)
SODIUM SERPL-SCNC: 141 MMOL/L (ref 136–145)
T AXIS: 173 DEGREES
T OFFSET: 388 MS
VENTRICULAR RATE: 93 BPM
WBC # BLD AUTO: 7.6 X10*3/UL (ref 4.4–11.3)

## 2024-10-01 PROCEDURE — 99223 1ST HOSP IP/OBS HIGH 75: CPT | Performed by: NURSE PRACTITIONER

## 2024-10-01 PROCEDURE — 2500000001 HC RX 250 WO HCPCS SELF ADMINISTERED DRUGS (ALT 637 FOR MEDICARE OP): Performed by: NURSE PRACTITIONER

## 2024-10-01 PROCEDURE — 71046 X-RAY EXAM CHEST 2 VIEWS: CPT

## 2024-10-01 PROCEDURE — G0378 HOSPITAL OBSERVATION PER HR: HCPCS

## 2024-10-01 PROCEDURE — 99285 EMERGENCY DEPT VISIT HI MDM: CPT

## 2024-10-01 PROCEDURE — 2500000004 HC RX 250 GENERAL PHARMACY W/ HCPCS (ALT 636 FOR OP/ED)

## 2024-10-01 PROCEDURE — 82330 ASSAY OF CALCIUM: CPT

## 2024-10-01 PROCEDURE — 2500000001 HC RX 250 WO HCPCS SELF ADMINISTERED DRUGS (ALT 637 FOR MEDICARE OP)

## 2024-10-01 PROCEDURE — 82435 ASSAY OF BLOOD CHLORIDE: CPT

## 2024-10-01 PROCEDURE — 90937 HEMODIALYSIS REPEATED EVAL: CPT

## 2024-10-01 PROCEDURE — 2500000004 HC RX 250 GENERAL PHARMACY W/ HCPCS (ALT 636 FOR OP/ED): Performed by: EMERGENCY MEDICINE

## 2024-10-01 PROCEDURE — 2500000001 HC RX 250 WO HCPCS SELF ADMINISTERED DRUGS (ALT 637 FOR MEDICARE OP): Performed by: STUDENT IN AN ORGANIZED HEALTH CARE EDUCATION/TRAINING PROGRAM

## 2024-10-01 PROCEDURE — 93005 ELECTROCARDIOGRAM TRACING: CPT

## 2024-10-01 PROCEDURE — 71046 X-RAY EXAM CHEST 2 VIEWS: CPT | Performed by: RADIOLOGY

## 2024-10-01 PROCEDURE — 96374 THER/PROPH/DIAG INJ IV PUSH: CPT

## 2024-10-01 PROCEDURE — 2500000001 HC RX 250 WO HCPCS SELF ADMINISTERED DRUGS (ALT 637 FOR MEDICARE OP): Performed by: EMERGENCY MEDICINE

## 2024-10-01 PROCEDURE — 83605 ASSAY OF LACTIC ACID: CPT | Performed by: EMERGENCY MEDICINE

## 2024-10-01 PROCEDURE — 94660 CPAP INITIATION&MGMT: CPT

## 2024-10-01 PROCEDURE — 93010 ELECTROCARDIOGRAM REPORT: CPT | Performed by: EMERGENCY MEDICINE

## 2024-10-01 PROCEDURE — 2500000002 HC RX 250 W HCPCS SELF ADMINISTERED DRUGS (ALT 637 FOR MEDICARE OP, ALT 636 FOR OP/ED): Performed by: NURSE PRACTITIONER

## 2024-10-01 PROCEDURE — 83880 ASSAY OF NATRIURETIC PEPTIDE: CPT

## 2024-10-01 PROCEDURE — 36415 COLL VENOUS BLD VENIPUNCTURE: CPT

## 2024-10-01 PROCEDURE — 2500000004 HC RX 250 GENERAL PHARMACY W/ HCPCS (ALT 636 FOR OP/ED): Mod: JG

## 2024-10-01 PROCEDURE — 85025 COMPLETE CBC W/AUTO DIFF WBC: CPT

## 2024-10-01 PROCEDURE — 2500000004 HC RX 250 GENERAL PHARMACY W/ HCPCS (ALT 636 FOR OP/ED): Performed by: NURSE PRACTITIONER

## 2024-10-01 PROCEDURE — 84484 ASSAY OF TROPONIN QUANT: CPT

## 2024-10-01 PROCEDURE — 2500000005 HC RX 250 GENERAL PHARMACY W/O HCPCS

## 2024-10-01 PROCEDURE — 96376 TX/PRO/DX INJ SAME DRUG ADON: CPT

## 2024-10-01 PROCEDURE — 36415 COLL VENOUS BLD VENIPUNCTURE: CPT | Performed by: EMERGENCY MEDICINE

## 2024-10-01 PROCEDURE — 84132 ASSAY OF SERUM POTASSIUM: CPT

## 2024-10-01 PROCEDURE — 99285 EMERGENCY DEPT VISIT HI MDM: CPT | Performed by: EMERGENCY MEDICINE

## 2024-10-01 PROCEDURE — 96375 TX/PRO/DX INJ NEW DRUG ADDON: CPT

## 2024-10-01 RX ORDER — IPRATROPIUM BROMIDE AND ALBUTEROL SULFATE 2.5; .5 MG/3ML; MG/3ML
3 SOLUTION RESPIRATORY (INHALATION) 2 TIMES DAILY
Status: DISCONTINUED | OUTPATIENT
Start: 2024-10-01 | End: 2024-10-05 | Stop reason: HOSPADM

## 2024-10-01 RX ORDER — TORSEMIDE 20 MG/1
20 TABLET ORAL
Status: DISCONTINUED | OUTPATIENT
Start: 2024-10-02 | End: 2024-10-05 | Stop reason: HOSPADM

## 2024-10-01 RX ORDER — TRAMADOL HYDROCHLORIDE 50 MG/1
50 TABLET ORAL ONCE
Status: COMPLETED | OUTPATIENT
Start: 2024-10-01 | End: 2024-10-01

## 2024-10-01 RX ORDER — HYDRALAZINE HYDROCHLORIDE 20 MG/ML
10 INJECTION INTRAMUSCULAR; INTRAVENOUS ONCE
Status: COMPLETED | OUTPATIENT
Start: 2024-10-01 | End: 2024-10-01

## 2024-10-01 RX ORDER — FLUTICASONE FUROATE AND VILANTEROL 100; 25 UG/1; UG/1
1 POWDER RESPIRATORY (INHALATION)
Status: DISCONTINUED | OUTPATIENT
Start: 2024-10-01 | End: 2024-10-05 | Stop reason: HOSPADM

## 2024-10-01 RX ORDER — FLUTICASONE PROPIONATE 50 MCG
2 SPRAY, SUSPENSION (ML) NASAL DAILY
Status: DISCONTINUED | OUTPATIENT
Start: 2024-10-01 | End: 2024-10-05 | Stop reason: HOSPADM

## 2024-10-01 RX ORDER — NIFEDIPINE 90 MG/1
90 TABLET, EXTENDED RELEASE ORAL
Status: DISCONTINUED | OUTPATIENT
Start: 2024-10-02 | End: 2024-10-05 | Stop reason: HOSPADM

## 2024-10-01 RX ORDER — PANTOPRAZOLE SODIUM 40 MG/1
40 TABLET, DELAYED RELEASE ORAL
Status: DISCONTINUED | OUTPATIENT
Start: 2024-10-02 | End: 2024-10-05 | Stop reason: HOSPADM

## 2024-10-01 RX ORDER — ACETAMINOPHEN 325 MG/1
975 TABLET ORAL ONCE
Status: COMPLETED | OUTPATIENT
Start: 2024-10-01 | End: 2024-10-01

## 2024-10-01 RX ORDER — HYDRALAZINE HYDROCHLORIDE 50 MG/1
100 TABLET, FILM COATED ORAL 3 TIMES DAILY
Status: DISCONTINUED | OUTPATIENT
Start: 2024-10-01 | End: 2024-10-05 | Stop reason: HOSPADM

## 2024-10-01 RX ORDER — ACETAMINOPHEN 325 MG/1
TABLET ORAL
Status: COMPLETED
Start: 2024-10-01 | End: 2024-10-01

## 2024-10-01 RX ORDER — LABETALOL HYDROCHLORIDE 5 MG/ML
INJECTION, SOLUTION INTRAVENOUS
Status: COMPLETED
Start: 2024-10-01 | End: 2024-10-01

## 2024-10-01 RX ORDER — CARVEDILOL 25 MG/1
50 TABLET ORAL 2 TIMES DAILY
Status: DISCONTINUED | OUTPATIENT
Start: 2024-10-01 | End: 2024-10-05 | Stop reason: HOSPADM

## 2024-10-01 RX ORDER — SUMATRIPTAN 50 MG/1
25 TABLET, FILM COATED ORAL ONCE
Status: COMPLETED | OUTPATIENT
Start: 2024-10-01 | End: 2024-10-01

## 2024-10-01 RX ORDER — CLONIDINE HYDROCHLORIDE 0.1 MG/1
0.2 TABLET ORAL 3 TIMES DAILY
Status: DISCONTINUED | OUTPATIENT
Start: 2024-10-01 | End: 2024-10-05 | Stop reason: HOSPADM

## 2024-10-01 RX ORDER — HEPARIN SODIUM 5000 [USP'U]/ML
5000 INJECTION, SOLUTION INTRAVENOUS; SUBCUTANEOUS EVERY 8 HOURS SCHEDULED
Status: DISCONTINUED | OUTPATIENT
Start: 2024-10-01 | End: 2024-10-01

## 2024-10-01 RX ORDER — AMOXICILLIN 250 MG
2 CAPSULE ORAL 2 TIMES DAILY
Status: DISCONTINUED | OUTPATIENT
Start: 2024-10-01 | End: 2024-10-05 | Stop reason: HOSPADM

## 2024-10-01 RX ORDER — ISOSORBIDE MONONITRATE 30 MG/1
120 TABLET, EXTENDED RELEASE ORAL DAILY
Status: DISCONTINUED | OUTPATIENT
Start: 2024-10-01 | End: 2024-10-05 | Stop reason: HOSPADM

## 2024-10-01 RX ORDER — HYDRALAZINE HYDROCHLORIDE 20 MG/ML
INJECTION INTRAMUSCULAR; INTRAVENOUS
Status: DISPENSED
Start: 2024-10-01 | End: 2024-10-01

## 2024-10-01 RX ORDER — ACETAMINOPHEN 325 MG/1
650 TABLET ORAL ONCE
Status: COMPLETED | OUTPATIENT
Start: 2024-10-01 | End: 2024-10-01

## 2024-10-01 RX ORDER — NITROGLYCERIN 0.4 MG/1
0.4 TABLET SUBLINGUAL EVERY 5 MIN PRN
Status: COMPLETED | OUTPATIENT
Start: 2024-10-01 | End: 2024-10-01

## 2024-10-01 RX ORDER — VALSARTAN 320 MG/1
320 TABLET ORAL DAILY
Status: DISCONTINUED | OUTPATIENT
Start: 2024-10-01 | End: 2024-10-05 | Stop reason: HOSPADM

## 2024-10-01 RX ORDER — CLONIDINE HYDROCHLORIDE 0.1 MG/1
0.2 TABLET ORAL ONCE
Status: COMPLETED | OUTPATIENT
Start: 2024-10-01 | End: 2024-10-01

## 2024-10-01 RX ORDER — GABAPENTIN 300 MG/1
300 CAPSULE ORAL NIGHTLY
Status: DISCONTINUED | OUTPATIENT
Start: 2024-10-01 | End: 2024-10-05 | Stop reason: HOSPADM

## 2024-10-01 RX ORDER — ASPIRIN 81 MG/1
81 TABLET ORAL DAILY
Status: DISCONTINUED | OUTPATIENT
Start: 2024-10-01 | End: 2024-10-05 | Stop reason: HOSPADM

## 2024-10-01 RX ORDER — LABETALOL HYDROCHLORIDE 5 MG/ML
10 INJECTION, SOLUTION INTRAVENOUS ONCE
Status: COMPLETED | OUTPATIENT
Start: 2024-10-01 | End: 2024-10-01

## 2024-10-01 RX ORDER — FUROSEMIDE 10 MG/ML
80 INJECTION INTRAMUSCULAR; INTRAVENOUS ONCE
Status: COMPLETED | OUTPATIENT
Start: 2024-10-01 | End: 2024-10-01

## 2024-10-01 RX ORDER — FUROSEMIDE 10 MG/ML
INJECTION INTRAMUSCULAR; INTRAVENOUS
Status: COMPLETED
Start: 2024-10-01 | End: 2024-10-01

## 2024-10-01 RX ORDER — NITROGLYCERIN 0.4 MG/1
TABLET SUBLINGUAL
Status: COMPLETED
Start: 2024-10-01 | End: 2024-10-01

## 2024-10-01 RX ORDER — ATORVASTATIN CALCIUM 80 MG/1
80 TABLET, FILM COATED ORAL NIGHTLY
Status: DISCONTINUED | OUTPATIENT
Start: 2024-10-01 | End: 2024-10-05 | Stop reason: HOSPADM

## 2024-10-01 RX ORDER — HYDRALAZINE HYDROCHLORIDE 20 MG/ML
INJECTION INTRAMUSCULAR; INTRAVENOUS
Status: COMPLETED
Start: 2024-10-01 | End: 2024-10-01

## 2024-10-01 RX ORDER — POLYETHYLENE GLYCOL 3350 17 G/17G
17 POWDER, FOR SOLUTION ORAL DAILY
Status: DISCONTINUED | OUTPATIENT
Start: 2024-10-01 | End: 2024-10-05 | Stop reason: HOSPADM

## 2024-10-01 RX ORDER — CALCIUM ACETATE 667 MG/1
1334 CAPSULE ORAL
Status: DISCONTINUED | OUTPATIENT
Start: 2024-10-01 | End: 2024-10-05 | Stop reason: HOSPADM

## 2024-10-01 RX ORDER — HYDRALAZINE HYDROCHLORIDE 20 MG/ML
10 INJECTION INTRAMUSCULAR; INTRAVENOUS EVERY 6 HOURS PRN
Status: DISCONTINUED | OUTPATIENT
Start: 2024-10-01 | End: 2024-10-05 | Stop reason: HOSPADM

## 2024-10-01 RX ORDER — PARICALCITOL 5 UG/ML
5 INJECTION, SOLUTION INTRAVENOUS
Status: DISCONTINUED | OUTPATIENT
Start: 2024-10-01 | End: 2024-10-05 | Stop reason: HOSPADM

## 2024-10-01 RX ORDER — HYDROXYZINE HYDROCHLORIDE 25 MG/1
25 TABLET, FILM COATED ORAL EVERY 6 HOURS PRN
Status: DISCONTINUED | OUTPATIENT
Start: 2024-10-01 | End: 2024-10-05 | Stop reason: HOSPADM

## 2024-10-01 RX ORDER — ACETAMINOPHEN 500 MG
5 TABLET ORAL NIGHTLY
Status: DISCONTINUED | OUTPATIENT
Start: 2024-10-01 | End: 2024-10-05 | Stop reason: HOSPADM

## 2024-10-01 RX ORDER — CLONIDINE HYDROCHLORIDE 0.1 MG/1
0.2 TABLET ORAL ONCE
Status: DISCONTINUED | OUTPATIENT
Start: 2024-10-01 | End: 2024-10-01

## 2024-10-01 ASSESSMENT — ENCOUNTER SYMPTOMS
HEMATOLOGIC/LYMPHATIC NEGATIVE: 1
LIGHT-HEADEDNESS: 1
ENDOCRINE NEGATIVE: 1
CARDIOVASCULAR NEGATIVE: 1
FATIGUE: 1
ALLERGIC/IMMUNOLOGIC NEGATIVE: 1
SHORTNESS OF BREATH: 1
MUSCULOSKELETAL NEGATIVE: 1
COUGH: 1
GASTROINTESTINAL NEGATIVE: 1
PSYCHIATRIC NEGATIVE: 1

## 2024-10-01 ASSESSMENT — PAIN SCALES - GENERAL
PAINLEVEL_OUTOF10: 5 - MODERATE PAIN
PAINLEVEL_OUTOF10: 0 - NO PAIN

## 2024-10-01 ASSESSMENT — COLUMBIA-SUICIDE SEVERITY RATING SCALE - C-SSRS
5. HAVE YOU STARTED TO WORK OUT OR WORKED OUT THE DETAILS OF HOW TO KILL YOURSELF? DO YOU INTEND TO CARRY OUT THIS PLAN?: NO
2. HAVE YOU ACTUALLY HAD ANY THOUGHTS OF KILLING YOURSELF?: NO
1. IN THE PAST MONTH, HAVE YOU WISHED YOU WERE DEAD OR WISHED YOU COULD GO TO SLEEP AND NOT WAKE UP?: NO
6. HAVE YOU EVER DONE ANYTHING, STARTED TO DO ANYTHING, OR PREPARED TO DO ANYTHING TO END YOUR LIFE?: NO
4. HAVE YOU HAD THESE THOUGHTS AND HAD SOME INTENTION OF ACTING ON THEM?: NO

## 2024-10-01 ASSESSMENT — PAIN DESCRIPTION - DESCRIPTORS
DESCRIPTORS: HEADACHE
DESCRIPTORS: HEADACHE

## 2024-10-01 ASSESSMENT — PAIN - FUNCTIONAL ASSESSMENT
PAIN_FUNCTIONAL_ASSESSMENT: NO/DENIES PAIN
PAIN_FUNCTIONAL_ASSESSMENT: 0-10

## 2024-10-01 ASSESSMENT — LIFESTYLE VARIABLES
HAVE YOU EVER FELT YOU SHOULD CUT DOWN ON YOUR DRINKING: NO
HAVE PEOPLE ANNOYED YOU BY CRITICIZING YOUR DRINKING: NO
EVER HAD A DRINK FIRST THING IN THE MORNING TO STEADY YOUR NERVES TO GET RID OF A HANGOVER: NO
EVER FELT BAD OR GUILTY ABOUT YOUR DRINKING: NO
TOTAL SCORE: 0

## 2024-10-01 ASSESSMENT — PAIN SCALES - PAIN ASSESSMENT IN ADVANCED DEMENTIA (PAINAD)
FACIALEXPRESSION: SMILING OR INEXPRESSIVE
CONSOLABILITY: NO NEED TO CONSOLE
TOTALSCORE: 0
BODYLANGUAGE: RELAXED
BODYLANGUAGE: NORMAL

## 2024-10-01 ASSESSMENT — PAIN DESCRIPTION - PROGRESSION: CLINICAL_PROGRESSION: NOT CHANGED

## 2024-10-01 NOTE — PROGRESS NOTES
Renal Staff HD Note    Patient seen, examined on dialysis   Tolerating treatment without event  O2 per NC     BP Readings from Last 3 Encounters:   10/01/24 (!) 173/92   09/29/24 119/63   09/26/24 (!) 185/78     [unfilled]  Lab Results   Component Value Date    CREATININE 9.48 (H) 10/01/2024    BUN 77 (H) 10/01/2024     10/01/2024    K 5.2 10/01/2024     10/01/2024    CO2 26 10/01/2024     Lab Results   Component Value Date    PTH 1,415.0 (H) 05/31/2024    CALCIUM 9.3 10/01/2024    CAION 1.18 03/14/2023    PHOS 6.3 (H) 09/26/2024     @  Lab Results   Component Value Date    HGB 9.5 (L) 10/01/2024       Continue treatment per submitted orders

## 2024-10-01 NOTE — PROGRESS NOTES
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  This patient was a signout to me and during the time of signout patient was pending dialysis and medicine placement.  Patient had been treated with 20 mg of hydralazine and 0.2 clonidine.  Patient was placed on 4 L of oxygen due to hypoxia.  During my shift, patient was still hypertensive at 198/100.  This is most likely due to patient needing dialysis.  Patient was also having respiratory distress so patient was put on BiPAP.  The BiPAP was then taken off right before dialysis.  After the BiPAP was taking off, I reevaluated patient.  Patient felt better and was no longer in respiratory distress.  Patient was then admitted to the medicine team and dialyzed.

## 2024-10-01 NOTE — NURSING NOTE
.Report to Receiving RN:    Report To: Lorie via secure chat called twice  Time Report Called: [id number]  Hand-Off Communication: Hello, pt tolerated HD well, Fluid remove 1.3 Liter Post /76 HR 82.   Complications During Treatment: No  Ultrafiltration Treatment: No  Medications Administered During Dialysis: No  Blood Products Administered During Dialysis: No  Labs Sent During Dialysis: No  Heparin Drip Rate Changes: No  Dialysis Catheter Dressing: AVF  Last Dressing Change: N/A

## 2024-10-01 NOTE — SIGNIFICANT EVENT
Patient seen while in dialysis session and appears to be comfortable, no longer in acute distress. No longer tachypneic and able to speak in complete sentences. Stable BP of 124/75.     Vanna Kumar, LILLY Student

## 2024-10-01 NOTE — NURSING NOTE
Report from Sending RN:    Report From: Clarissa ( PONCHO)  Recent Surgery of Procedure: No  Baseline Level of Consciousness (LOC): a/o x 4  Oxygen Use: yes, 4 liters  Type: NC  Diabetic: No  Last BP Med Given Day of Dialysis: yes, see mar  Last Pain Med Given: none  Lab Tests to be Obtained with Dialysis: No  Blood Transfusion to be Given During Dialysis: No  Available IV Access: Yes  Medications to be Administered During Dialysis: No  Continuous IV Infusion Running: No  Restraints on Currently or in the Last 24 Hours: No  Hand-Off Communication: No acute overnight morning events; Pt will not need labs; Pt is a full code; Cassidy Gonzalez RN.  Dialysis Catheter Dressing: right AVF  Last Dressing Change: yes

## 2024-10-01 NOTE — ED TRIAGE NOTES
Pt to ED c/o shortness of breath, pt is 84% on room air. Pt receives dialysis T/TH/S, had full treatment on Saturday. Pt HTN in triage, called critical, pt to room #3.

## 2024-10-01 NOTE — ED PROVIDER NOTES
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  Patient reports that she was awoken out of her sleep with sudden onset of shortness of breath.  While in triage the patient was saturating 84% on room air therefore she was called as a critical patient.  The patient reports taking all of her at home blood pressure medications as prescribed.  Before going to sleep she reports her blood pressure was 160 systolic.  While in triage her blood pressure was in the undetectable systolic range.  The patient denies fevers, chills, chest pain, nausea, or vomiting.    External Records Reviewed: Previous ED records, inpatient records, and outpatient records  ???????????????????????????????????????????????????????????????  Triage Vitals:  T 36.1 °C (96.9 °F)  HR 98  BP (!) 223/68  RR (!) 28  O2 99 % Supplemental oxygen    Physical Exam  Constitutional:       General: She is awake. She is in acute distress.      Appearance: She is diaphoretic.      Comments: The patient is in respiratory distress   HENT:      Head: Normocephalic and atraumatic.   Eyes:      Extraocular Movements: Extraocular movements intact.      Conjunctiva/sclera: Conjunctivae normal.      Pupils: Pupils are equal, round, and reactive to light.   Cardiovascular:      Rate and Rhythm: Normal rate and regular rhythm.      Pulses:           Radial pulses are 2+ on the right side and 2+ on the left side.        Dorsalis pedis pulses are 2+ on the right side and 2+ on the left side.      Heart sounds: Normal heart sounds, S1 normal and S2 normal. Heart sounds not distant. No murmur heard.     No friction rub.   Pulmonary:      Effort: Respiratory distress  present.      Comments: Mild bibasilar crackles.  Abdominal:      General: Abdomen is protuberant. There is no distension.      Palpations: Abdomen is soft.      Tenderness: There is no abdominal tenderness. There is no guarding or rebound.   Musculoskeletal:      Right lower leg: No edema.      Left lower leg: No edema.   Skin:     General: Skin is warm.      Capillary Refill: Capillary refill takes less than 2 seconds.   Neurological:      Mental Status: She is alert and oriented to person, place, and time.        ???????????????????????????????????????????????????????????????  ED Course/Treatment/Medical Decision Making    EKG Interpretation:   Normal sinus rhythm. Rate of 93 bpm. Normal axis. Normal intervals. No acute ST elevations, depressions, or T wave inversions.  There is significant artifact present.  Unchanged when compared to previous EKG completed on September 24, 2024.    Independent Interpretation of Studies:  I independently interpreted: EKG, CXR    Differential diagnoses considered include but ar not limited to: Hypertensive emergency, ACS, acute heart failure exacerbation    Social Determinants Limiting Care:  None identified         ED Course:  Diagnoses as of 10/03/24 1047   Hypertensive emergency       MDM:    Patient is a 54-year-old female with above PMH who presents to the emergency department for chief complaint of shortness of breath.  Upon arrival through triage the patient was called a critical and moved to room 3.  Initial vital signs remarkable for tachypnea and a undetectable systolic blood pressure.  The patient was given sublingual nitroglycerin with reduction in blood pressure as well as 80 mg of IV Lasix.  After reduction in blood pressure the patient's respiratory status improved and she is no longer tachypneic therefore we will defer BiPAP at this time.  EKG is nonischemic as dictated above and troponin is elevated at 40 which is near the patient's baseline.  Patient required  further blood pressure management with oral clonidine and IV hydralazine.  She remains no longer in respiratory distress.  The patient was signed out to oncoming ED provider in stable condition pending blood pressure control, reevaluation, and further disposition.    Impression:  Signed out to oncoming ED resident    Disposition:  Signed out to oncoming ED resident    Patient was staffed discussed with ED attending Dr. Junaid Reed, DO   Emergency Medicine, PGY-2    Procedures ? [employer] last updated 10/1/2024 8:57 AM          Carmelo Reed,   Resident  10/03/24 1047       Carmelo Reed DO  Resident  10/03/24 1047

## 2024-10-01 NOTE — H&P
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 Patient states she started with SOB this morning.  She denies any sick contacts and no recent illness.  Patient was just discharged from the hospital on 9/26/24 for similar issues.  Patient states no missed or shortened dialysis w/last being Saturday.      While in the ED the patient was found to be 84% on RA and started on 4L NC. BP was 225/100 and given 20mg of hydralazine with repeat BP of 189/104. BNP elevated at 1562. CXR suggesting pulmonary edema. Completed HD session Saturday. Patient to be admitted observation for hypertensive emergency.     ROS/EXAM     Review of Systems   Constitutional:  Positive for fatigue.   HENT: Negative.     Respiratory:  Positive for cough and shortness of breath.    Cardiovascular: Negative.    Gastrointestinal: Negative.    Endocrine: Negative.    Genitourinary: Negative.    Musculoskeletal: Negative.    Skin: Negative.    Allergic/Immunologic: Negative.    Neurological:  Positive for light-headedness.   Hematological: Negative.    Psychiatric/Behavioral: Negative.       Physical Exam  Constitutional:       General: She is in acute distress.   HENT:      Head: Normocephalic and atraumatic.      Nose: Nose normal.      Mouth/Throat:      Pharynx: Oropharynx is clear.   Cardiovascular:      Rate and Rhythm: Normal rate and regular rhythm.      Pulses: Normal pulses.      Heart sounds: Normal heart sounds.   Pulmonary:      Effort: Tachypnea and accessory muscle usage present.      Comments: Crackles bilateral, 4L O2  Abdominal:      General: There is distension.   Musculoskeletal:      Cervical back: Normal range of motion.      Comments: ELI AVF + thrill and bruit    Skin:     General: Skin is warm.   Neurological:      Mental Status: She is  alert and oriented to person, place, and time.   Psychiatric:         Mood and Affect: Mood normal.         Histories     Past Medical History:   Diagnosis Date    Bacteremia due to methicillin resistant Staphylococcus aureus 07/08/2024    Cardiac/pericardial tamponade 01/20/2024    CHF (congestive heart failure)     COPD (chronic obstructive pulmonary disease) (Multi)     Coronary artery disease     Disorder of sweat glands 02/25/2023    Dry eye syndrome of bilateral lacrimal glands 03/07/2017    Dry eyes    ESRD (end stage renal disease) (Multi)     Essential (primary) hypertension 12/27/2022    Hypertension    History of acute pancreatitis 12/21/2020    History of acute pancreatitis    Low grade squamous intraepithelial lesion (LGSIL) on cervicovaginal cytologic smear 02/25/2023    Migraines     History of migraine    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    Type 2 myocardial infarction (Multi) 01/20/2024     Past Surgical History:   Procedure Laterality Date    APPENDECTOMY  03/07/2017    Appendectomy    CT ABDOMEN ANGIOGRAM W AND/OR WO IV CONTRAST  4/23/2023    CT ABDOMEN ANGIOGRAM W AND/OR WO IV CONTRAST [facility]    OTHER SURGICAL HISTORY  03/07/2017    Cystoscopy With Pyeloscopy With Removal Of Calculus    OTHER SURGICAL HISTORY  03/07/2017    Anoscopy For Polyp Removal    OTHER SURGICAL HISTORY  12/08/2021    Arteriovenous fistula creation procedure    OTHER SURGICAL HISTORY  12/08/2021    Dialysis tunneled catheter placement    TUBAL LIGATION  03/07/2017    Tubal Ligation     Family History   Problem Relation Name Age of Onset    Other (Cerebrovascular Accident) Father      Heart failure Paternal Grandmother      Breast cancer Paternal Grandmother      Other (Primary Cervical Cancer) Paternal Grandmother      Diabetes Paternal Grandfather      Breast cancer Father's Sister      Ovarian cancer Father's Sister        reports that she has quit smoking. Her smoking use included cigarettes. She has  "been exposed to tobacco smoke. She has never used smokeless tobacco. She reports that she does not currently use alcohol. She reports that she does not currently use drugs after having used the following drugs: Cocaine and Marijuana.    Vitals/LABS/RESULTS     Last Recorded Vitals  Blood pressure (!) 219/108, pulse 89, temperature 36.1 °C (96.9 °F), temperature source Temporal, resp. rate 16, height 1.397 m (4' 7\"), weight 52.6 kg (116 lb), SpO2 99%.  Intake/Output last 3 Shifts:  No intake/output data recorded.    Relevant Results  Lab Results   Component Value Date    WBC 7.6 10/01/2024    HGB 9.5 (L) 10/01/2024    HCT 31.6 (L) 10/01/2024    MCV 96 10/01/2024     10/01/2024      Lab Results   Component Value Date    GLUCOSE 99 10/01/2024    CALCIUM 9.3 10/01/2024     10/01/2024    K 5.2 10/01/2024    CO2 26 10/01/2024     10/01/2024    BUN 77 (H) 10/01/2024    CREATININE 9.48 (H) 10/01/2024     XR chest 2 views    Result Date: 10/1/2024  Interpreted By:  Mo Jeffrey, STUDY: XR CHEST 2 VIEWS;  10/1/2024 4:28 am   INDICATION: Signs/Symptoms:shortness of breath.     COMPARISON: 09/24/2024   ACCESSION NUMBER(S): [id number]   ORDERING CLINICIAN: FARRUKH GIPSON   FINDINGS: Heart size is enlarged. Features suggesting pulmonary edema. No pneumothorax. Demineralization of the bones. Vascular calcification.       1.  Features suggesting pulmonary edema. Follow-up is advised       MACRO: None   Signed by: Mo Jeffrey 10/1/2024 4:48 AM Dictation workstation:   [id number]      Medications     Scheduled medications  apixaban, 5 mg, oral, BID  aspirin, 81 mg, oral, Daily  atorvastatin, 80 mg, oral, Nightly  calcium acetate, 1,334 mg, oral, TID  carvedilol, 50 mg, oral, BID  cloNIDine, 0.2 mg, oral, TID  fluticasone, 2 spray, Each Nostril, Daily  fluticasone furoate-vilanteroL, 1 puff, inhalation, Daily  gabapentin, 300 mg, oral, Nightly  hydrALAZINE, 100 mg, oral, TID  isosorbide mononitrate ER, 120 " mg, oral, Daily  melatonin, 5 mg, oral, Nightly  [START ON 10/2/2024] NIFEdipine ER, 90 mg, oral, Daily before breakfast  [START ON 10/2/2024] pantoprazole, 40 mg, oral, Daily before breakfast  paricalcitol, 5 mcg, intravenous, After Dialysis  polyethylene glycol, 17 g, oral, Daily  sennosides-docusate sodium, 2 tablet, oral, BID  [Held by provider] torsemide, 20 mg, oral, Once per day on Sunday Monday Wednesday Friday  valsartan, 320 mg, oral, Daily  vitamin B complex-vitamin C-folic acid, 1 capsule, oral, Daily      Continuous medications     PRN medications  PRN medications: hydrOXYzine HCL    PLAN   Stacey is a 55 y/o female with a PMHx:  of ESRD on HD (T/Th,Sat via RUE AVF), HTN, HFpEF, COPD, CAD, HLD, polyneuropathy d/t DM2, 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 Patient states she started with SOB this morning.  She denies any sick contacts and no recent illness.  Patient was just discharged from the hospital on 9/26/24 for similar issues.  Patient states no missed or shortened dialysis w/last being Saturday.      While in the ED the patient was found to be 84% on RA and started on 4L NC. BP was 225/100 and given 20mg of hydralazine with repeat BP of 189/104. BNP elevated at 1562. CXR suggesting pulmonary edema. Completed HD session Saturday. Patient to be admitted observation for hypertensive emergency.     Assessment/Plan:    Hypertensive emergency  HTN  Acute on chronic HFpEF (ef 60-65%, 4/3/24)  HLD  CAD  -BP on arrival 225/100  -continue Aspirin 81 mg daily  -continue Atorvastatin 80mg daily  -continue carvedilol 25mg BID  -continue clonidine 0.2mg TID  -continue hydralazine 100mg TID  -continue isosorbide mononitrate ER 120mg daily  -continue Nifedipine ER 90mg daily  -continue valsartan 320mg daily  -c/w home torsemide 20mg S/M/W/F-- no dialysis days  -Strict I+O, daily weights  -telemetry   -consult nephrology for resistant  hypertension    ESRD  -Dialysis T/Th/Sat at [facility]  -continue nephrocaps 1mg daily  -continue calcium acetate 667mg daily  -consult nephrology dialysis appreciate recs    T2DM  Polyneuropathy   -continue gabapentin 300mg nightly   -stable diet no home meds,  -A1C 5.2, 6/29/24  -Accuchecks no SSI    COPD  -baseline room air  -continue Breo Elipta 100-25mcg/dose daily  -duonebs prn    GERD  -c/w home protonix 40mg daily    DVT  -continue with Eliquis 5mg BID      Fluids: monitor and replete as needed  Electrolytes: monitor and replete as needed  Nutrition: Regular diet   GI prophylaxis: Protonix 40mg QD  DVT prophylaxis: SCD, Eliquis  Code Status: Full Code  PCP  Pharmacy    Disposition:  Admitted for above diagnoses. Plan per above. Anticipate observation stay.    LILLY Aguilera Student          I spent 75 minutes in the professional and overall care on this encounter before, during and after the visit, examining the patient, reviewing labs, writing orders and documenting the note

## 2024-10-02 PROCEDURE — 2500000001 HC RX 250 WO HCPCS SELF ADMINISTERED DRUGS (ALT 637 FOR MEDICARE OP): Performed by: STUDENT IN AN ORGANIZED HEALTH CARE EDUCATION/TRAINING PROGRAM

## 2024-10-02 PROCEDURE — 96376 TX/PRO/DX INJ SAME DRUG ADON: CPT

## 2024-10-02 PROCEDURE — 2500000004 HC RX 250 GENERAL PHARMACY W/ HCPCS (ALT 636 FOR OP/ED): Performed by: STUDENT IN AN ORGANIZED HEALTH CARE EDUCATION/TRAINING PROGRAM

## 2024-10-02 PROCEDURE — G0378 HOSPITAL OBSERVATION PER HR: HCPCS

## 2024-10-02 PROCEDURE — 94640 AIRWAY INHALATION TREATMENT: CPT

## 2024-10-02 PROCEDURE — 99231 SBSQ HOSP IP/OBS SF/LOW 25: CPT | Performed by: STUDENT IN AN ORGANIZED HEALTH CARE EDUCATION/TRAINING PROGRAM

## 2024-10-02 PROCEDURE — 2500000001 HC RX 250 WO HCPCS SELF ADMINISTERED DRUGS (ALT 637 FOR MEDICARE OP): Performed by: NURSE PRACTITIONER

## 2024-10-02 PROCEDURE — 2500000002 HC RX 250 W HCPCS SELF ADMINISTERED DRUGS (ALT 637 FOR MEDICARE OP, ALT 636 FOR OP/ED): Performed by: NURSE PRACTITIONER

## 2024-10-02 PROCEDURE — 2500000005 HC RX 250 GENERAL PHARMACY W/O HCPCS

## 2024-10-02 RX ORDER — ACETAMINOPHEN 325 MG/1
650 TABLET ORAL 3 TIMES DAILY PRN
Status: DISCONTINUED | OUTPATIENT
Start: 2024-10-02 | End: 2024-10-05 | Stop reason: HOSPADM

## 2024-10-02 RX ORDER — TRAMADOL HYDROCHLORIDE 50 MG/1
50 TABLET ORAL ONCE
Status: COMPLETED | OUTPATIENT
Start: 2024-10-02 | End: 2024-10-02

## 2024-10-02 RX ORDER — LABETALOL HYDROCHLORIDE 5 MG/ML
10 INJECTION, SOLUTION INTRAVENOUS ONCE
Status: COMPLETED | OUTPATIENT
Start: 2024-10-02 | End: 2024-10-02

## 2024-10-02 SDOH — SOCIAL STABILITY: SOCIAL INSECURITY: HAS ANYONE EVER THREATENED TO HURT YOUR FAMILY OR YOUR PETS?: NO

## 2024-10-02 SDOH — SOCIAL STABILITY: SOCIAL INSECURITY: ABUSE: ADULT

## 2024-10-02 SDOH — ECONOMIC STABILITY: FOOD INSECURITY: WITHIN THE PAST 12 MONTHS, YOU WORRIED THAT YOUR FOOD WOULD RUN OUT BEFORE YOU GOT THE MONEY TO BUY MORE.: NEVER TRUE

## 2024-10-02 SDOH — ECONOMIC STABILITY: FOOD INSECURITY: WITHIN THE PAST 12 MONTHS, THE FOOD YOU BOUGHT JUST DIDN'T LAST AND YOU DIDN'T HAVE MONEY TO GET MORE.: NEVER TRUE

## 2024-10-02 SDOH — ECONOMIC STABILITY: HOUSING INSECURITY: AT ANY TIME IN THE PAST 12 MONTHS, WERE YOU HOMELESS OR LIVING IN A SHELTER (INCLUDING NOW)?: NO

## 2024-10-02 SDOH — ECONOMIC STABILITY: INCOME INSECURITY: IN THE PAST 12 MONTHS, HAS THE ELECTRIC, GAS, OIL, OR WATER COMPANY THREATENED TO SHUT OFF SERVICE IN YOUR HOME?: NO

## 2024-10-02 SDOH — SOCIAL STABILITY: SOCIAL INSECURITY: HAVE YOU HAD THOUGHTS OF HARMING ANYONE ELSE?: NO

## 2024-10-02 SDOH — SOCIAL STABILITY: SOCIAL INSECURITY: WITHIN THE LAST YEAR, HAVE YOU BEEN HUMILIATED OR EMOTIONALLY ABUSED IN OTHER WAYS BY YOUR PARTNER OR EX-PARTNER?: NO

## 2024-10-02 SDOH — SOCIAL STABILITY: SOCIAL INSECURITY: DOES ANYONE TRY TO KEEP YOU FROM HAVING/CONTACTING OTHER FRIENDS OR DOING THINGS OUTSIDE YOUR HOME?: NO

## 2024-10-02 SDOH — SOCIAL STABILITY: SOCIAL INSECURITY: WITHIN THE LAST YEAR, HAVE YOU BEEN AFRAID OF YOUR PARTNER OR EX-PARTNER?: NO

## 2024-10-02 SDOH — ECONOMIC STABILITY: TRANSPORTATION INSECURITY: IN THE PAST 12 MONTHS, HAS LACK OF TRANSPORTATION KEPT YOU FROM MEDICAL APPOINTMENTS OR FROM GETTING MEDICATIONS?: NO

## 2024-10-02 SDOH — ECONOMIC STABILITY: INCOME INSECURITY: HOW HARD IS IT FOR YOU TO PAY FOR THE VERY BASICS LIKE FOOD, HOUSING, MEDICAL CARE, AND HEATING?: SOMEWHAT HARD

## 2024-10-02 SDOH — SOCIAL STABILITY: SOCIAL INSECURITY: WERE YOU ABLE TO COMPLETE ALL THE BEHAVIORAL HEALTH SCREENINGS?: YES

## 2024-10-02 SDOH — ECONOMIC STABILITY: HOUSING INSECURITY: IN THE LAST 12 MONTHS, WAS THERE A TIME WHEN YOU WERE NOT ABLE TO PAY THE MORTGAGE OR RENT ON TIME?: NO

## 2024-10-02 SDOH — SOCIAL STABILITY: SOCIAL INSECURITY
WITHIN THE LAST YEAR, HAVE YOU BEEN RAPED OR FORCED TO HAVE ANY KIND OF SEXUAL ACTIVITY BY YOUR PARTNER OR EX-PARTNER?: NO

## 2024-10-02 SDOH — ECONOMIC STABILITY: INCOME INSECURITY: IN THE LAST 12 MONTHS, WAS THERE A TIME WHEN YOU WERE NOT ABLE TO PAY THE MORTGAGE OR RENT ON TIME?: NO

## 2024-10-02 SDOH — ECONOMIC STABILITY: INCOME INSECURITY: IN THE PAST 12 MONTHS HAS THE ELECTRIC, GAS, OIL, OR WATER COMPANY THREATENED TO SHUT OFF SERVICES IN YOUR HOME?: NO

## 2024-10-02 SDOH — SOCIAL STABILITY: SOCIAL INSECURITY: DO YOU FEEL ANYONE HAS EXPLOITED OR TAKEN ADVANTAGE OF YOU FINANCIALLY OR OF YOUR PERSONAL PROPERTY?: NO

## 2024-10-02 SDOH — ECONOMIC STABILITY: FOOD INSECURITY: WITHIN THE PAST 12 MONTHS, YOU WORRIED THAT YOUR FOOD WOULD RUN OUT BEFORE YOU GOT MONEY TO BUY MORE.: NEVER TRUE

## 2024-10-02 SDOH — ECONOMIC STABILITY: HOUSING INSECURITY: IN THE PAST 12 MONTHS, HOW MANY TIMES HAVE YOU MOVED WHERE YOU WERE LIVING?: 0

## 2024-10-02 SDOH — SOCIAL STABILITY: SOCIAL INSECURITY: ARE YOU OR HAVE YOU BEEN THREATENED OR ABUSED PHYSICALLY, EMOTIONALLY, OR SEXUALLY BY ANYONE?: NO

## 2024-10-02 SDOH — ECONOMIC STABILITY: FOOD INSECURITY: HOW HARD IS IT FOR YOU TO PAY FOR THE VERY BASICS LIKE FOOD, HOUSING, MEDICAL CARE, AND HEATING?: SOMEWHAT HARD

## 2024-10-02 SDOH — SOCIAL STABILITY: SOCIAL INSECURITY: DO YOU FEEL UNSAFE GOING BACK TO THE PLACE WHERE YOU ARE LIVING?: NO

## 2024-10-02 SDOH — SOCIAL STABILITY: SOCIAL INSECURITY: ARE THERE ANY APPARENT SIGNS OF INJURIES/BEHAVIORS THAT COULD BE RELATED TO ABUSE/NEGLECT?: NO

## 2024-10-02 ASSESSMENT — ACTIVITIES OF DAILY LIVING (ADL)
LACK_OF_TRANSPORTATION: NO
BATHING: INDEPENDENT
WALKS IN HOME: INDEPENDENT
HEARING - RIGHT EAR: FUNCTIONAL
DRESSING YOURSELF: INDEPENDENT
HEARING - LEFT EAR: FUNCTIONAL
PATIENT'S MEMORY ADEQUATE TO SAFELY COMPLETE DAILY ACTIVITIES?: YES
GROOMING: INDEPENDENT
ADEQUATE_TO_COMPLETE_ADL: YES
TOILETING: INDEPENDENT
JUDGMENT_ADEQUATE_SAFELY_COMPLETE_DAILY_ACTIVITIES: YES
FEEDING YOURSELF: INDEPENDENT
LACK_OF_TRANSPORTATION: NO

## 2024-10-02 ASSESSMENT — COGNITIVE AND FUNCTIONAL STATUS - GENERAL
DAILY ACTIVITIY SCORE: 24
MOBILITY SCORE: 24
PATIENT BASELINE BEDBOUND: NO

## 2024-10-02 ASSESSMENT — PAIN SCALES - GENERAL
PAINLEVEL_OUTOF10: 0 - NO PAIN
PAINLEVEL_OUTOF10: 6
PAINLEVEL_OUTOF10: 6
PAINLEVEL_OUTOF10: 10 - WORST POSSIBLE PAIN
PAINLEVEL_OUTOF10: 7

## 2024-10-02 ASSESSMENT — LIFESTYLE VARIABLES
HOW OFTEN DO YOU HAVE A DRINK CONTAINING ALCOHOL: NEVER
SKIP TO QUESTIONS 9-10: 1
AUDIT-C TOTAL SCORE: 0
HOW MANY STANDARD DRINKS CONTAINING ALCOHOL DO YOU HAVE ON A TYPICAL DAY: PATIENT DOES NOT DRINK
AUDIT-C TOTAL SCORE: 0
HOW OFTEN DO YOU HAVE 6 OR MORE DRINKS ON ONE OCCASION: NEVER

## 2024-10-02 ASSESSMENT — PAIN - FUNCTIONAL ASSESSMENT: PAIN_FUNCTIONAL_ASSESSMENT: 0-10

## 2024-10-02 ASSESSMENT — PAIN SCALES - WONG BAKER: WONGBAKER_NUMERICALRESPONSE: HURTS EVEN MORE

## 2024-10-02 NOTE — PROGRESS NOTES
Stacey Heath is a 54 y.o. female on day 0 of admission presenting with Hypertensive emergency.             Objective     Last Recorded Vitals  /63 (BP Location: Left arm, Patient Position: Sitting)   Pulse 75   Temp 35.5 °C (95.9 °F) (Temporal)   Resp 16   Wt 52.6 kg (116 lb)   SpO2 98%   Intake/Output last 3 Shifts:  No intake or output data in the 24 hours ending 10/02/24 1643    Admission Weight  Weight: 52.6 kg (116 lb) (10/01/24 0328)    Daily Weight  10/01/24 : 52.6 kg (116 lb)    Image Results  ECG 12 lead  Normal sinus rhythm  Possible Left atrial enlargement  ST & T wave abnormality, consider inferolateral ischemia  Abnormal ECG  When compared with ECG of 24-SEP-2024 14:41,  ST less depressed in Lateral leads  T wave amplitude has decreased in Anterior leads  T wave inversion less evident in Lateral leads    See ED provider note for full interpretation and clinical correlation  Confirmed by Bibi Toussaint ([id number]) on 10/1/2024 11:57:02 PM  XR chest 2 views  Narrative: Interpreted By:  Mo Jeffrey,   STUDY:  XR CHEST 2 VIEWS;  10/1/2024 4:28 am      INDICATION:  Signs/Symptoms:shortness of breath.          COMPARISON:  09/24/2024      ACCESSION NUMBER(S):  [id number]      ORDERING CLINICIAN:  FARRUKH GIPSON      FINDINGS:  Heart size is enlarged. Features suggesting pulmonary edema. No  pneumothorax. Demineralization of the bones. Vascular calcification.      Impression: 1.  Features suggesting pulmonary edema. Follow-up is advised              MACRO:  None      Signed by: Mo Jeffrey 10/1/2024 4:48 AM  Dictation workstation:   [id number]      Physical Exam    Constitutional:       General: She is not in acute distress.   HENT:      Head: Normocephalic and atraumatic.      Nose: Nose normal.      Mouth/Throat:      Pharynx: Oropharynx is clear.   Cardiovascular:      Rate and Rhythm: Normal rate and regular rhythm.      Pulses: Normal pulses.      Heart sounds: Normal heart sounds.    Pulmonary:      Effort: CTAB   Abdominal:  BS+    Musculoskeletal:      Cervical back: Normal range of motion.      Comments: ELI AVF + thrill and bruit    Skin:     General: Skin is warm.   Neurological:      Mental Status: She is alert and oriented to person, place, and time.   Psychiatric:         Mood and Affect: Mood normal.   Relevant Results               Assessment/Plan                  Assessment & Plan  Hypertensive emergency    Subjective   Pt was seen  and examined in HD unit, symptoms have improved since yesterday.  On 3 L NC  Pending further recommendations from renal team regarding resistance HTN.   BP is better controlled.  Can be discharged home in 1-2 days  Pt has no acute event overnight. Addressed all of the patient concerns and explained the treatment plan.     --------------------------------------------------  Hypertensive emergency  HTN  Acute on chronic HFpEF (ef 60-65%, 4/3/24)  HLD  CAD  -BP on arrival 225/100  -continue Aspirin 81 mg daily  -continue Atorvastatin 80mg daily  -continue carvedilol 25mg BID  -continue clonidine 0.2mg TID  -continue hydralazine 100mg TID  -continue isosorbide mononitrate ER 120mg daily  -continue Nifedipine ER 90mg daily  -continue valsartan 320mg daily  -c/w home torsemide 20mg S/M/W/F-- no dialysis days  -Strict I+O, daily weights  -telemetry   -consult nephrology for resistant hypertension     ESRD  -Dialysis T/Th/Sat at [facility]  -continue nephrocaps 1mg daily  -continue calcium acetate 667mg daily  -consult nephrology dialysis appreciate recs     T2DM  Polyneuropathy   -continue gabapentin 300mg nightly   -stable diet no home meds,  -A1C 5.2, 6/29/24  -Accuchecks no SSI     COPD  -baseline room air  -continue Breo Elipta 100-25mcg/dose daily  -duonebs prn     GERD  -c/w home protonix 40mg daily     DVT  -continue with Eliquis 5mg BID        Fluids: monitor and replete as needed  Electrolytes: monitor and replete as needed  Nutrition: Regular diet   GI  prophylaxis: Protonix 40mg QD  DVT prophylaxis: SCD, Eliquis  Code Status: Full Code  PCP  Pharmacy     Disposition: home   30 min                 Soniya Ruff MD

## 2024-10-02 NOTE — NURSING NOTE
Patient arrived stable to [place] [id number] for further management of HTN.  Patient complained of headache, Tylenol was given for pain. VSS. Telemetry applied. Patient belongings include clothing, Ipad, , and shoes. Patient was oriented to floor and room call light system.     Karishma Badillo RN

## 2024-10-02 NOTE — PROGRESS NOTES
Pharmacy Medication History Review    Stacey Heath is a 54 y.o. female admitted for Hypertensive emergency. Pharmacy reviewed the patient's [id number]-ie-uyydbecqa medications and allergies for accuracy.    Medications ADDED:  none  Medications CHANGED:  none  Medications REMOVED:   none     The list below reflects the updated PTA list.   Prior to Admission Medications   Prescriptions Informant   NIFEdipine ER (Adalat CC) 90 mg 24 hr tablet Self   Sig: Take 1 tablet (90 mg) by mouth once daily in the morning. Take before meals. Do not crush, chew, or split.   SUMAtriptan (Imitrex) 25 mg tablet Self   Sig: Take 1 tablet (25 mg) by mouth 1 time if needed for migraine. May repeat dose once in 2 hours if no relief.  Do not exceed 2 doses in 24 hours.   albuterol (ProAir HFA) 90 mcg/actuation inhaler Self   Sig: Inhale 2 puffs every 4 hours if needed for wheezing or shortness of breath.   albuterol 1.25 mg/3 mL nebulizer solution Self   Sig: Take 3 mL (1.25 mg) by nebulization every 6 hours if needed for wheezing or shortness of breath.   apixaban (Eliquis) 5 mg tablet Self   Sig: Take 1 tablet (5 mg) by mouth 2 times a day.   aspirin 81 mg EC tablet Self   Sig: Take 1 tablet (81 mg) by mouth once daily.   atorvastatin (Lipitor) 80 mg tablet Self   Sig: Take 1 tablet (80 mg) by mouth once daily at bedtime.   calcium acetate (Phoslo) 667 mg capsule Self   Sig: Take 2 capsules (1,334 mg) by mouth 3 times daily (morning, midday, late afternoon).   carvedilol (Coreg) 25 mg tablet Self   Sig: Take 2 tablets (50 mg) by mouth 2 times a day.   cloNIDine (Catapres) 0.2 mg tablet Self   Sig: Take 1 tablet (0.2 mg) by mouth 3 times a day.   epoetin joceline (Epogen,Procrit) 10,000 unit/mL injection Self   Sig: Inject 1 mL (10,000 Units) under the skin 3 times a week.   fluticasone (Flonase) 50 mcg/actuation nasal spray Self   Sig: Administer 2 sprays into each nostril once daily. Shake gently. Before first use, prime pump. After use,  clean tip and replace cap.   fluticasone furoate-vilanteroL (Breo Ellipta) 100-25 mcg/dose inhaler Self   Sig: Inhale 1 puff once daily.   gabapentin (Neurontin) 300 mg capsule Self   Sig: Take 1 capsule (300 mg) by mouth once daily at bedtime.   hydrALAZINE (Apresoline) 100 mg tablet Self   Sig: Take 1 tablet (100 mg) by mouth 3 times a day.   hydrOXYzine HCL (Atarax) 25 mg tablet Self   Sig: Take 1 tablet (25 mg) by mouth every 6 hours if needed for anxiety, allergies or itching.   isosorbide mononitrate ER (Imdur) 120 mg 24 hr tablet Self   Sig: Take 1 tablet (120 mg) by mouth once daily. Do not crush or chew.   melatonin 5 mg tablet Self   Sig: Take 1 tablet (5 mg) by mouth once daily at bedtime.   pantoprazole (ProtoNix) 40 mg EC tablet Self   Sig: Take 1 tablet (40 mg) by mouth once daily in the morning. Take before meals. Do not crush, chew, or split.   polyethylene glycol (Glycolax, Miralax) 17 gram/dose powder Self   Sig: Take 17 g (1 scoop dissolved in liquid) by mouth once daily. Do not start before July 26, 2024.   torsemide (Demadex) 20 mg tablet Self   Sig: Take 2 tablets once daily on non-dialysis days only. Do not take on dialysis days   valsartan (Diovan) 320 mg tablet Self   Sig: Take 1 tablet (320 mg) by mouth once daily.   vitamin B complex-vitamin C-folic acid (Nephrocaps) 1 mg capsule Self   Sig: Take 1 capsule by mouth once daily.      Facility-Administered Medications: None        The list below reflects the updated allergy list. Please review each documented allergy for additional clarification and justification.  Allergies  Reviewed by Margarita Meza, APRN-CNP on 10/1/2024        Severity Reactions Comments    Iodine High Hives, Itching, Unknown     Bee Pollen Not Specified Unknown     Bioflavonoids Not Specified Swelling     Citrus And Derivatives Not Specified Unknown     Codeine Not Specified Itching, Hives, Unknown Tolerates percocet   Tolerates percocet Tolerates percocet    Flowers  "Not Specified Itching     Hydromorphone Not Specified Itching     Shellfish Containing Products Not Specified Swelling SEAFOOD            Patient accepts M2B at discharge.     Sources:   Reliable historian, patient interviewed at bedside  Review of out patient fill history, [facility]   Discharge summary 9/26/2024    Additional Comments:  Patient had inquired about receiving shingrix inpatient (last dose mentioned in [facility] dispense summary was 6/2021 as a first dose)  Patient also inquired about possible mail delivery for her home medications for future fills      Kathie Blank, PharmD  Transitions of Care Pharmacist  10/02/24     Secure Chat preferred   If no response call [phone] or [phone] (fka InphoMatch)era \"Med Rec\"    "

## 2024-10-03 ENCOUNTER — APPOINTMENT (OUTPATIENT)
Dept: DIALYSIS | Facility: HOSPITAL | Age: 54
End: 2024-10-03
Payer: COMMERCIAL

## 2024-10-03 LAB
ALBUMIN SERPL BCP-MCNC: 3.6 G/DL (ref 3.4–5)
ANION GAP SERPL CALC-SCNC: 19 MMOL/L (ref 10–20)
BUN SERPL-MCNC: 68 MG/DL (ref 6–23)
CALCIUM SERPL-MCNC: 9.3 MG/DL (ref 8.6–10.6)
CHLORIDE SERPL-SCNC: 98 MMOL/L (ref 98–107)
CO2 SERPL-SCNC: 27 MMOL/L (ref 21–32)
CREAT SERPL-MCNC: 9.53 MG/DL (ref 0.5–1.05)
EGFRCR SERPLBLD CKD-EPI 2021: 5 ML/MIN/1.73M*2
ERYTHROCYTE [DISTWIDTH] IN BLOOD BY AUTOMATED COUNT: 16.1 % (ref 11.5–14.5)
GLUCOSE SERPL-MCNC: 136 MG/DL (ref 74–99)
HCT VFR BLD AUTO: 29.3 % (ref 36–46)
HGB BLD-MCNC: 9 G/DL (ref 12–16)
MCH RBC QN AUTO: 29.2 PG (ref 26–34)
MCHC RBC AUTO-ENTMCNC: 30.7 G/DL (ref 32–36)
MCV RBC AUTO: 95 FL (ref 80–100)
NRBC BLD-RTO: 0 /100 WBCS (ref 0–0)
PHOSPHATE SERPL-MCNC: 7.5 MG/DL (ref 2.5–4.9)
PLATELET # BLD AUTO: 222 X10*3/UL (ref 150–450)
POTASSIUM SERPL-SCNC: 5.7 MMOL/L (ref 3.5–5.3)
RBC # BLD AUTO: 3.08 X10*6/UL (ref 4–5.2)
SODIUM SERPL-SCNC: 138 MMOL/L (ref 136–145)
TSI SER-ACNC: <1 TSI INDEX
WBC # BLD AUTO: 6.5 X10*3/UL (ref 4.4–11.3)

## 2024-10-03 PROCEDURE — G0378 HOSPITAL OBSERVATION PER HR: HCPCS

## 2024-10-03 PROCEDURE — 96376 TX/PRO/DX INJ SAME DRUG ADON: CPT

## 2024-10-03 PROCEDURE — 2500000004 HC RX 250 GENERAL PHARMACY W/ HCPCS (ALT 636 FOR OP/ED): Performed by: NURSE PRACTITIONER

## 2024-10-03 PROCEDURE — 99233 SBSQ HOSP IP/OBS HIGH 50: CPT | Performed by: STUDENT IN AN ORGANIZED HEALTH CARE EDUCATION/TRAINING PROGRAM

## 2024-10-03 PROCEDURE — 36415 COLL VENOUS BLD VENIPUNCTURE: CPT | Performed by: STUDENT IN AN ORGANIZED HEALTH CARE EDUCATION/TRAINING PROGRAM

## 2024-10-03 PROCEDURE — 2500000004 HC RX 250 GENERAL PHARMACY W/ HCPCS (ALT 636 FOR OP/ED): Performed by: STUDENT IN AN ORGANIZED HEALTH CARE EDUCATION/TRAINING PROGRAM

## 2024-10-03 PROCEDURE — 2500000001 HC RX 250 WO HCPCS SELF ADMINISTERED DRUGS (ALT 637 FOR MEDICARE OP): Performed by: NURSE PRACTITIONER

## 2024-10-03 PROCEDURE — 2500000002 HC RX 250 W HCPCS SELF ADMINISTERED DRUGS (ALT 637 FOR MEDICARE OP, ALT 636 FOR OP/ED): Performed by: NURSE PRACTITIONER

## 2024-10-03 PROCEDURE — 85027 COMPLETE CBC AUTOMATED: CPT | Performed by: STUDENT IN AN ORGANIZED HEALTH CARE EDUCATION/TRAINING PROGRAM

## 2024-10-03 PROCEDURE — 90935 HEMODIALYSIS ONE EVALUATION: CPT | Performed by: INTERNAL MEDICINE

## 2024-10-03 PROCEDURE — 80069 RENAL FUNCTION PANEL: CPT | Performed by: STUDENT IN AN ORGANIZED HEALTH CARE EDUCATION/TRAINING PROGRAM

## 2024-10-03 PROCEDURE — 94640 AIRWAY INHALATION TREATMENT: CPT

## 2024-10-03 RX ORDER — TORSEMIDE 20 MG/1
TABLET ORAL
Qty: 30 TABLET | Refills: 2 | Status: SHIPPED | OUTPATIENT
Start: 2024-10-03

## 2024-10-03 RX ORDER — ISOSORBIDE MONONITRATE 120 MG/1
120 TABLET, EXTENDED RELEASE ORAL DAILY
Qty: 30 TABLET | Refills: 0 | Status: SHIPPED | OUTPATIENT
Start: 2024-10-03 | End: 2024-11-02

## 2024-10-03 RX ORDER — NIFEDIPINE 90 MG/1
90 TABLET, EXTENDED RELEASE ORAL
Qty: 30 TABLET | Refills: 0 | Status: SHIPPED | OUTPATIENT
Start: 2024-10-03 | End: 2024-11-02

## 2024-10-03 RX ORDER — CARVEDILOL 25 MG/1
50 TABLET ORAL 2 TIMES DAILY
Qty: 120 TABLET | Refills: 0 | Status: SHIPPED | OUTPATIENT
Start: 2024-10-03 | End: 2024-11-02

## 2024-10-03 RX ORDER — HYDRALAZINE HYDROCHLORIDE 100 MG/1
100 TABLET, FILM COATED ORAL 3 TIMES DAILY
Qty: 90 TABLET | Refills: 0 | Status: SHIPPED | OUTPATIENT
Start: 2024-10-03 | End: 2024-11-02

## 2024-10-03 RX ORDER — CLONIDINE HYDROCHLORIDE 0.2 MG/1
0.2 TABLET ORAL 3 TIMES DAILY
Qty: 90 TABLET | Refills: 0 | Status: SHIPPED | OUTPATIENT
Start: 2024-10-03 | End: 2024-11-02

## 2024-10-03 RX ORDER — VALSARTAN 320 MG/1
320 TABLET ORAL DAILY
Qty: 30 TABLET | Refills: 0 | Status: SHIPPED | OUTPATIENT
Start: 2024-10-03 | End: 2024-11-02

## 2024-10-03 ASSESSMENT — PAIN SCALES - GENERAL
PAINLEVEL_OUTOF10: 0 - NO PAIN

## 2024-10-03 ASSESSMENT — PAIN - FUNCTIONAL ASSESSMENT
PAIN_FUNCTIONAL_ASSESSMENT: 0-10
PAIN_FUNCTIONAL_ASSESSMENT: NO/DENIES PAIN

## 2024-10-03 ASSESSMENT — COGNITIVE AND FUNCTIONAL STATUS - GENERAL
DAILY ACTIVITIY SCORE: 24
CLIMB 3 TO 5 STEPS WITH RAILING: A LITTLE
DAILY ACTIVITIY SCORE: 24
MOBILITY SCORE: 23
MOBILITY SCORE: 24

## 2024-10-03 ASSESSMENT — ACTIVITIES OF DAILY LIVING (ADL): LACK_OF_TRANSPORTATION: NO

## 2024-10-03 NOTE — NURSING NOTE
Pt complained of SOB. Pt was saturating at 87-89% on RA. Pt is currently on 1L NC and saturating at 97%. MD was notified, and want pt on 1L and wean off O2 in 3 hrs.    Lanette Joyce RN

## 2024-10-03 NOTE — NURSING NOTE
.Report from Sending RN:    Report From: PONCHO Daugherty  Recent Surgery of Procedure: No  Baseline Level of Consciousness (LOC): A&O X4  Oxygen Use: Yes  Type: 3 Liter  Diabetic: Yes  Last BP Med Given Day of Dialysis: Clonidine  Last Pain Med Given: none  Lab Tests to be Obtained with Dialysis: Yes  Blood Transfusion to be Given During Dialysis: No  Available IV Access: Yes  Medications to be Administered During Dialysis: No  Continuous IV Infusion Running: No  Restraints on Currently or in the Last 24 Hours: No  Hand-Off Communication: Pt had no acute event overnight, vital signs stable. Not on precaution, travel by cart.  Dialysis Catheter Dressing: Yes  Last Dressing Change: AVF

## 2024-10-03 NOTE — PROGRESS NOTES
Transitional Care Coordination Progress Note:  Patient discussed during interdisciplinary rounds.  Team members present: AGUSTIN TANG  Plan per Medical/Surgical team: Pt presented with hypertensive emergency after missed dialysis. Plan for discharge home today after dialysis, no home going needs anticipated. SW consulted due to frequent hospital admissions for missed dialysis.  Payer: [employer]  Status: Observation  Discharge disposition: Home  Potential Barriers: none  ADOD: 10/3  Attempted to meet with pt this morning but she was off the unit in dialysis. Plan to meet with pt another time. Care coordinator will continue to follow for discharge planning needs.     Dorothy Ramos RN  Transitional Care Coordinator (TCC)  [phone] or [phone]

## 2024-10-03 NOTE — SIGNIFICANT EVENT
10/03/24 1600   Provider Notification   Reason for Communication Abnormal vitals   Provider Name Soniya Ruff MD   Provider Role Attending physician   Method of Communication Secure Text   Details of Communication RN notified MD about pt's SPO2. Pt was saturating at 87-89% on RA.   Response Other (Comment)  (MD wants pt on 1L and wean off in 3 hrs.)   Notification Time 1558     Pt is currently on 1L and saturating at 97%.

## 2024-10-03 NOTE — NURSING NOTE
Report to Receiving RN:    Report To: PONCHO Stoddard  Time Report Called: 1142  Hand-Off Communication: Pt completed 3.45 hrs HD tx, 2L net fluid removed. /80, HR 80, no tx complications, pt stable, A/O.  Complications During Treatment: No  Ultrafiltration Treatment: Yes  Medications Administered During Dialysis: Yes  Blood Products Administered During Dialysis: N/A  Labs Sent During Dialysis: Yes  Heparin Drip Rate Changes: Yes      Last Updated: 11:53 AM by SAKINA MOELLER

## 2024-10-03 NOTE — PROGRESS NOTES
10/03/24 1318   Discharge Planning   Living Arrangements Spouse/significant other;Family members   Support Systems Spouse/significant other;Family members   Assistance Needed none   Type of Residence Private residence   Number of Stairs to Enter Residence 6   Number of Stairs Within Residence 15   Do you have animals or pets at home? No   Who is requesting discharge planning? Patient   Home or Post Acute Services In home services   Type of Home Care Services Other (Comment);Home nursing visits  (SW)   Expected Discharge Disposition Home Health  ([facility] (central 3))   Does the patient need discharge transport arranged? No  (pt stated she will call for a ride)   Financial Resource Strain   How hard is it for you to pay for the very basics like food, housing, medical care, and heating? Not hard   Housing Stability   In the last 12 months, was there a time when you were not able to pay the mortgage or rent on time? N   In the past 12 months, how many times have you moved where you were living? 0   At any time in the past 12 months, were you homeless or living in a shelter (including now)? N   Transportation Needs   In the past 12 months, has lack of transportation kept you from medical appointments or from getting medications? no   In the past 12 months, has lack of transportation kept you from meetings, work, or from getting things needed for daily living? No     Met with pt and introduced myself as care coordinator and member of the Care Transitions team for discharge planning. Pt lives at home with her fiance and nephew. Pt reports being completely independent with ADL's/iADL's, she walks without use of an assistive device, and does not drive. Pt denies any falls. Pt stated she feels safe at home. Pt's address, phone number, and contact information was verified. Pt denies any social or financial concerns at this time.    Home care: Yes  Agency: [facility] [phone] (central 3)  Disciplines: RN/LPN and SW visits  "2x/week. Pt reports they assist her making appointments and basically \"we just sit and talk.\"  DME: nebulizer machine, walker, glucometer + supplies.  : none.  PCP: Eli Gastelum MD (pt stated she has yet to meet PCP  Last appt: Appt next month.   Transport to appts: Pt stated she will get a ride.  Pharm: [facility] (denies issues affording/obtaining medications).  HD Center: [facility]. Pt reports last HD session was on 9/28 at HD center. Pt stated she gets a ride to/from dialysis and reports no issue with getting transport.    Readmission Note: Attending stated pt is well known to him from prior admissions, he is aware she is a re-admit to the hospital. Pt was admitted on 9/17 with c/o SOB, HTN ER, and lightheadedness, she was discharged home on 9/20. Pt re-admitted to the hospital on 10/1 with the same complaints as before. Pt is adamant that she takes all of her prescribed meds at home and does not miss dialysis in the community so this is not the reason for high BP and SOB. Pt stated she does not necessarily know if there is anything the hospital could've done to prevent re-admission because she is doing everything she is supposed to be doing at home. Pt stated her only concern with discharge is she does not feel comfortable with discharging with SBP of 202, she usually runs between 160-170's, attending/nursing updated.    Discharge Planning: Pt voiced no additional home going needs for discharge, she was provided with my business card with name/contact info and instructed to contact me directly regarding any questions/concerns related to discharge planning. Secure chat sent to [facility] central 3 intake to notify them of pt's discharge, and that pt is still OBS status so no home care orders should be needed for SLAVA. Care coordinator will continue to follow for discharge planning needs.    Dorothy Ramos RN  Transitional Care Coordinator (TCC)  [phone] or [phone]    "

## 2024-10-03 NOTE — CARE PLAN
The patient's goals for the shift include      The clinical goals for the shift include Pt will remin safe during shift    Over the shift, the patient did not make progress toward the following goals. Barriers to progression include   Problem: Fall/Injury  Goal: Be free from injury by end of the shift  Outcome: Progressing   . Recommendations to address these barriers include   Problem: Fall/Injury  Goal: Not fall by end of shift  Outcome: Progressing   .

## 2024-10-03 NOTE — RESEARCH NOTES
Artificial Intelligence Monitoring in Nursing (AIMS Nursing) Study    Principle Investigator - Dr. Feliciano Wallace  Research Coordinator - Ellen Allen     Patient Name - Stacey Heath  Date - 10/3/2024 11:10 AM  Location - [place] [address]    Stacey Heath was approached by Ellen Allen to talk about participating in the AIMS Nursing Study. The patient was not able to be approached, a research coordinator will come back at a later time. Study protocol was followed and patient was given study contact information.     Ellen Allen

## 2024-10-03 NOTE — PROGRESS NOTES
Stacey Heath is a 54 y.o. female on day 0 of admission presenting with Hypertensive emergency.             Objective     Last Recorded Vitals  /66   Pulse 89   Temp 36.5 °C (97.7 °F)   Resp 19   Wt 52.6 kg (116 lb)   SpO2 97%   Intake/Output last 3 Shifts:    Intake/Output Summary (Last 24 hours) at 10/3/2024 1724  Last data filed at 10/3/2024 1132  Gross per 24 hour   Intake 1200 ml   Output 2400 ml   Net -1200 ml       Admission Weight  Weight: 52.6 kg (116 lb) (10/01/24 0328)    Daily Weight  10/01/24 : 52.6 kg (116 lb)    Image Results  ECG 12 lead  Normal sinus rhythm  Possible Left atrial enlargement  ST & T wave abnormality, consider inferolateral ischemia  Abnormal ECG  When compared with ECG of 24-SEP-2024 14:41,  ST less depressed in Lateral leads  T wave amplitude has decreased in Anterior leads  T wave inversion less evident in Lateral leads    See ED provider note for full interpretation and clinical correlation  Confirmed by Bibi Toussaint ([id number]) on 10/1/2024 11:57:02 PM  XR chest 2 views  Narrative: Interpreted By:  Mo Jeffrey,   STUDY:  XR CHEST 2 VIEWS;  10/1/2024 4:28 am      INDICATION:  Signs/Symptoms:shortness of breath.          COMPARISON:  09/24/2024      ACCESSION NUMBER(S):  [id number]      ORDERING CLINICIAN:  FARRUKH GIPSON      FINDINGS:  Heart size is enlarged. Features suggesting pulmonary edema. No  pneumothorax. Demineralization of the bones. Vascular calcification.      Impression: 1.  Features suggesting pulmonary edema. Follow-up is advised              MACRO:  None      Signed by: Mo Jeffrey 10/1/2024 4:48 AM  Dictation workstation:   [id number]      Physical Exam    Constitutional:       General: She is not in acute distress.   HENT:      Head: Normocephalic and atraumatic.      Nose: Nose normal.      Mouth/Throat:      Pharynx: Oropharynx is clear.   Cardiovascular:      Rate and Rhythm: Normal rate and regular rhythm.      Pulses: Normal pulses.       Heart sounds: Normal heart sounds.   Pulmonary:      Effort: CTAB   Abdominal:  BS+    Musculoskeletal:      Cervical back: Normal range of motion.      Comments: ELI AVF + thrill and bruit    Skin:     General: Skin is warm.   Neurological:      Mental Status: She is alert and oriented to person, place, and time.   Psychiatric:         Mood and Affect: Mood normal.   Relevant Results               Assessment/Plan                  Assessment & Plan  Hypertensive emergency    Subjective         10/3    -Hypertensive, Improved with PO med this PM, Pt needs to get close to her dry weight, also   nutrition consulted to review her diet and limit her fluid gains. Renal will also notify outpatient unit to do an extra IUF to try to bring her dry weight down. She is good to return sat [facility]   -Plan for UF tomorrow  -Held Eliquis due to nosebleed. Will resume in 2 days.   -On RA.  -Can be dc home tmr     10/2    Pt was seen  and examined in HD unit, symptoms have improved since yesterday.  On 3 L NC  Pending further recommendations from renal team regarding resistance HTN.   BP is better controlled.  Can be discharged home in 1-2 days  Pt has no acute event overnight. Addressed all of the patient concerns and explained the treatment plan.     --------------------------------------------------  Hypertensive emergency  HTN  Acute on chronic HFpEF (ef 60-65%, 4/3/24)  HLD  CAD  -BP on arrival 225/100  -continue Aspirin 81 mg daily  -continue Atorvastatin 80mg daily  -continue carvedilol 25mg BID  -continue clonidine 0.2mg TID  -continue hydralazine 100mg TID  -continue isosorbide mononitrate ER 120mg daily  -continue Nifedipine ER 90mg daily  -continue valsartan 320mg daily  -c/w home torsemide 20mg S/M/W/F-- no dialysis days  -Strict I+O, daily weights  -telemetry   -consult nephrology for resistant hypertension     ESRD  -Dialysis T/Th/Sat at [facility]  -continue nephrocaps 1mg daily  -continue calcium acetate 667mg  daily  -consult nephrology dialysis appreciate recs     T2DM  Polyneuropathy   -continue gabapentin 300mg nightly   -stable diet no home meds,  -A1C 5.2, 6/29/24  -Accuchecks no SSI     COPD  -baseline room air  -continue Breo Elipta 100-25mcg/dose daily  -duonebs prn     GERD  -c/w home protonix 40mg daily     DVT  -continue with Eliquis 5mg BID        Fluids: monitor and replete as needed  Electrolytes: monitor and replete as needed  Nutrition: Regular diet   GI prophylaxis: Protonix 40mg QD  DVT prophylaxis: SCD, Eliquis  Code Status: Full Code  PCP  Pharmacy     Disposition: home   50 min                 Soniya Ruff MD

## 2024-10-03 NOTE — PROGRESS NOTES
Renal Staff HD Note    Patient seen, examined on dialysis   Tolerating treatment without event  O2 per NC 56.6 173 / 80 AVF 2K     BP Readings from Last 3 Encounters:   10/03/24 148/62   09/29/24 119/63   09/26/24 (!) 185/78     [unfilled]  Lab Results   Component Value Date    CREATININE 9.53 (H) 10/03/2024    BUN 68 (H) 10/03/2024     10/03/2024    K 5.7 (H) 10/03/2024    CL 98 10/03/2024    CO2 27 10/03/2024     Lab Results   Component Value Date    PTH 1,415.0 (H) 05/31/2024    CALCIUM 9.3 10/03/2024    CAION 1.18 03/14/2023    PHOS 7.5 (H) 10/03/2024     @  Lab Results   Component Value Date    HGB 9.0 (L) 10/03/2024       Continue treatment per submitted orders

## 2024-10-03 NOTE — PROGRESS NOTES
Stacey Heath is a 54 y.o. female on day 0 of admission presenting with Hypertensive emergency.    JERRELL attempted to call [facility] [phone]) to verify that pt is okay to return on Sat for her chair time. JERRELL left a .    UPDATE 11:15   SW attempted to call [facility] again and was sent to . SW will try again.    UPDATE 12:33  JERRELL called [facility] and spoke to the charge nurse. Charge nurse informed  that the pt can return to [facility] for dialysis on 10/5. They asked for pt's dc summary to be faxed to [phone].  JERRELL faxed AVS to [facility] at [phone].    JERRELL informed team.  Griselda Maloney MSW [facility] [place]  Care Transitions  2  [phone] or Epic Secure Chat

## 2024-10-04 ENCOUNTER — APPOINTMENT (OUTPATIENT)
Dept: DIALYSIS | Facility: HOSPITAL | Age: 54
End: 2024-10-04
Payer: COMMERCIAL

## 2024-10-04 ENCOUNTER — DOCUMENTATION (OUTPATIENT)
Dept: BEHAVIORAL HEALTH | Facility: CLINIC | Age: 54
End: 2024-10-04
Payer: COMMERCIAL

## 2024-10-04 PROBLEM — N18.6 ESRD ON HEMODIALYSIS (MULTI): Status: ACTIVE | Noted: 2024-10-04

## 2024-10-04 PROBLEM — Z99.2 ESRD ON HEMODIALYSIS (MULTI): Status: ACTIVE | Noted: 2024-10-04

## 2024-10-04 PROCEDURE — 90937 HEMODIALYSIS REPEATED EVAL: CPT

## 2024-10-04 PROCEDURE — G0378 HOSPITAL OBSERVATION PER HR: HCPCS

## 2024-10-04 PROCEDURE — 96376 TX/PRO/DX INJ SAME DRUG ADON: CPT

## 2024-10-04 PROCEDURE — 99233 SBSQ HOSP IP/OBS HIGH 50: CPT | Performed by: STUDENT IN AN ORGANIZED HEALTH CARE EDUCATION/TRAINING PROGRAM

## 2024-10-04 PROCEDURE — 2500000004 HC RX 250 GENERAL PHARMACY W/ HCPCS (ALT 636 FOR OP/ED): Performed by: STUDENT IN AN ORGANIZED HEALTH CARE EDUCATION/TRAINING PROGRAM

## 2024-10-04 PROCEDURE — 94640 AIRWAY INHALATION TREATMENT: CPT

## 2024-10-04 PROCEDURE — 2500000002 HC RX 250 W HCPCS SELF ADMINISTERED DRUGS (ALT 637 FOR MEDICARE OP, ALT 636 FOR OP/ED): Performed by: NURSE PRACTITIONER

## 2024-10-04 PROCEDURE — 2500000001 HC RX 250 WO HCPCS SELF ADMINISTERED DRUGS (ALT 637 FOR MEDICARE OP): Performed by: NURSE PRACTITIONER

## 2024-10-04 PROCEDURE — 2500000004 HC RX 250 GENERAL PHARMACY W/ HCPCS (ALT 636 FOR OP/ED): Performed by: NURSE PRACTITIONER

## 2024-10-04 ASSESSMENT — COGNITIVE AND FUNCTIONAL STATUS - GENERAL
MOBILITY SCORE: 24
DRESSING REGULAR LOWER BODY CLOTHING: A LITTLE
DAILY ACTIVITIY SCORE: 23

## 2024-10-04 ASSESSMENT — PAIN SCALES - GENERAL
PAINLEVEL_OUTOF10: 0 - NO PAIN

## 2024-10-04 NOTE — CARE PLAN
The patient's goals for the shift include      The clinical goals for the shift include Pt will remain safe during shift    Over the shift, the patient did not make progress toward the following goals. Barriers to progression include   Problem: Fall/Injury  Goal: Not fall by end of shift  Outcome: Progressing   . Recommendations to address these barriers include   Problem: Fall/Injury  Goal: Be free from injury by end of the shift  Outcome: Progressing   .

## 2024-10-04 NOTE — CONSULTS
Nutrition Diet Education  Provider consult order (Education: Pt needs to be close to her dry weight, nutrition to review her diet and limit her fluid gains)     Education Documentation  Nutrition Care Manual, taught by Pastora Hamilton RDN, YUKI at 10/4/2024 10:45 AM.  Learner: Patient  Readiness: Acceptance  Method: Explanation, Handout  Response: Verbalizes Understanding  Comment: Renal diet education      Provided pt with handouts: Sodium and Kidney Disease, [facility] Phosphorus, [facility] Potassium, [facility] Helpful Hints for Fluid Control. Went through handouts with pt. Discussed phosphorus, potassium, salt, fluid recommendations for HD. Educated on high phosphorus foods such as dairy products, nuts, seeds, peanut butter, dark cola. Went over phosphorus additives. Educated on high potassium foods and recommended pt consume these foods in moderation. Noted high potassium foods such as spinach, kale, brussels sprouts, broccoli, tomatoes. Reviewed fluid recommendations for HD which is 1000mL plus UOP. Reviewed sources of fluids such as ice cream, popsicles, water etc. Reviewed high sodium foods and encouraged pt to limit high sodium foods. Reviewed daily sodium intake recommendations. Recommended pt use herbs/. Slim as seasoning instead of using table salt.    Nutrition Significant Labs:  BMP Trend:   Results from last 7 days   Lab Units 10/03/24  0821 10/01/24  0349 09/29/24  1510   GLUCOSE mg/dL 136* 99 141*   CALCIUM mg/dL 9.3 9.3 10.3   SODIUM mmol/L 138 141 139   POTASSIUM mmol/L 5.7* 5.2 4.2   CO2 mmol/L 27 26 30   CHLORIDE mmol/L 98 101 96*   BUN mg/dL 68* 77* 47*   CREATININE mg/dL 9.53* 9.48* 6.81*    , Renal Lab Trend:   Results from last 7 days   Lab Units 10/03/24  0821 10/01/24  0349 09/29/24  1510   POTASSIUM mmol/L 5.7* 5.2 4.2   PHOSPHORUS mg/dL 7.5*  --   --    SODIUM mmol/L 138 141 139   MAGNESIUM mg/dL  --   --  2.28   EGFR mL/min/1.73m*2 5* 5* 7*   BUN mg/dL 68* 77* 47*   CREATININE mg/dL 9.53* 9.48* 6.81*           Follow Up:     Time Spent (min): 45 minutes  Last Date of Nutrition Visit: 10/04/24  Nutrition Follow-Up Needed?: Dietitian to reassess per policy  Follow up Comment: Diet education

## 2024-10-04 NOTE — PROGRESS NOTES
Stacey Heath  Age: 54 y.o.  MRN: [id number]  Date: 10/4/2024  Location of service: in community    Program Details  Medicaid Community Clinical Case Management  Status: Enrolled  Effective Dates: 10/17/2023 - present  Responsible Staff: NAREN Davidson      Goals Reviewed:  Problem: Anxiety       Goal: Attempts to manage anxiety with help       Priority: High         Goal: Verbalizes ways to manage anxiety       Priority: High         Goal: Implements measures to reduce anxiety       Priority: High        Problem: Financial Stressors       Goal: Assistance with financial concerns         Problem: Kidney Issues       Goal: Improve health to get on kidney transplant list       Priority: High        Problem: Negative Experience, Conflict with, or Distrust of Providers and/or Health System       Goal: Plan to Address Patient Specific Negative Experience, Distrust, or Conflict with Providers and/or Health System       Priority: High              Summary:  This provider [place] [facility] [place] for high blood pressure. This provider listened with empathy as patient spoke about her negative experiences while being hospitalized. This provider confirmed with the nurse that patient would still be able to get transferred for her scheduled cardiology appointment for today at 3:15 in [place] even though patient has been admitted. Nurse verbally confirmed that patient should be able to make the appointment.                      NAREN Davidson

## 2024-10-04 NOTE — NURSING NOTE
Report to Receiving RN:    Report To: Scotty RN  Time Report Called: 1800  Hand-Off Communication: UF treatment shortened by cintia alvarez 15 minutes D/T SBP in the 90's per Dr. Barraza's order, 1.2 liters of fluid removed, ending VSS  Complications During Treatment: Yes, hypotension  Ultrafiltration Treatment: Yes  Medications Administered During Dialysis: No  Blood Products Administered During Dialysis: No  Labs Sent During Dialysis: No  Heparin Drip Rate Changes: N/A  Dialysis Catheter Dressing: N/A  Last Dressing Change: N/A

## 2024-10-04 NOTE — NURSING NOTE
Report from Sending RN:    Report From: PONCHO Gama  Recent Surgery of Procedure: No  Baseline Level of Consciousness (LOC): A/O x 4  Oxygen Use: Yes, 1L O2 sat between 94-95%  Type: NC  Diabetic: No  Last BP Med Given Day of Dialysis: yes, last /73  Last Pain Med Given: none  Lab Tests to be Obtained with Dialysis: No  Blood Transfusion to be Given During Dialysis: No  Available IV Access: Yes  Medications to be Administered During Dialysis: No  Continuous IV Infusion Running: No  Restraints on Currently or in the Last 24 Hours: No  Hand-Off Communication: full code, no isolation, pt can stand safely for a weight, travel by cart   Dialysis Catheter Dressing: will assess catheter when pt arrives to the dialysis unit  Last Dressing Change: will assess catheter when pt arrives to the dialysis unit

## 2024-10-04 NOTE — PROGRESS NOTES
Transitional Care Coordination Progress Note:  Patient discussed during interdisciplinary rounds.  Team members present: AGUSTIN TANG  Plan per Medical/Surgical team: Per attending pt did not discharge home yesterday as renal wanted to do IUF with dialysis today to get pt closer to dry weight. Pt is planned for discharge home today after dialysis.  Payer: [employer]  Status: Observation  Discharge disposition: [facility] (central 3) for RN/LPN (assessments/assistance with meds) and SW services.  Potential Barriers: none  ADOD: 10/5  Secure chat sent to [facility] to notify them pt is still OBS status, per [facility] pt is not SLAVA still she is not inpatient, they will update the team regarding discharge. Care coordinator will continue to follow for discharge planning needs.     Dorothy Ramos RN  Transitional Care Coordinator (TCC)  [phone] or [phone]

## 2024-10-04 NOTE — CONSULTS
Heart Failure Nurse Navigator    The role of the HF nurse navigator is to (1) characterize risk profiles of patients with heart failure transitioning from ydtavedl-te-pyyhdgbtb after hospitalization, (2) recommend interventions to improve care and reduce risks of worse post-hospitalization outcomes. Potential recommendations may touch base on patient/family education, additional consults that may bring value, and appointment scheduling.    Assessment    I met with Stacey Heath at the bedside. She has an order to be discharged today, disposition is home. She has been scheduled a follow-up appointment with Dr. Garzon who she has seen in the past. We discussed appointment details. She has no further cardiology related questions or needs at this time.    Cardiology Appointment: Kacey Garzon 11/12/24 4pm      1. Medical Domain  What is the patient's most recent LVEF? 60-65%  HFrEF (LVEF <= 40%) Quadruple therapy recommended  HFmrEF (LVEF 41-49%) Quadruple therapy recommended  HFpEF (LVEF >= 50%) Minimum recommendations: SGLT2i and MRA  Is the patient on GDMT for their condition?   ARNI/ACEI/ARB: Yes  BB: Yes  MRA: No  SGLT2i: No  Could this patient have advanced heart failure (Stage D heart failure)?: No   If yes, the potential markers of advanced heart failure include:     REFERENCE: Potential markers of advanced HF   Inotrope (dobutamine or milrinone) used during this admission?   LVEF<=25%?   >=2 hospitalizations for ADHF in the last year?   Severe symptoms of HF (fatigue, dyspnea, confusion, edema) despite medical therapy?   Downtitration of GDMT as compared to home medications?   Discontinuation of GDMT because of hypotension or renal intolerance?   Recurrent arrhythmias (AF, VT with ICD shocks)?   Cardiac cachexia (i.e., unintentional weight loss due to HF)?   High-risk biomarker profile (e.g., hyponatremia [Na<135], very elevated BNP, worsening kidney function)   Escalating doses of diuretics or persistent edema  "despite escalation     2. Mind and Emotion  Does this patient have possible cognitive impairment?: No   Ask the patient to memorize these 3 words: banana, sunrise, chair  Ask the patient to draw a clock with hands pointing at \"20 minutes after 8\"  Ask the patient to recall the 3 words  Score: Add number of words recalled + clock drawing (0 points for any errors, 2 points if correct)  Interpretation: A score of 0-2 suggests cognitive impairment is present, a score of 3-5 suggests cognitive impairment is absent  Does this patient have major depression?: No   Over the last 2 weeks: Little interest or pleasure in doing things? (Not at all +0, Several days +1, More than half the days +2, Nearly every day +3)  Over the last 2 weeks: Feeling down, depressed or hopeless? (Not at all +0, Several days +1, More than half the days +2, Nearly every day +3)  Score: Add points  Interpretation: A score of 3 or more suggests that major depression is likely.     3. Physical Function  Could this patient be frail?: No   Defined by presence of all of these: slowness, weakness, shrinking, inactivity, exhaustion  Is this patient at risk for falling?: No   Defined by having experienced a fall in the last 12 months.    4. Social Determinants of Health  Transportation deficits?: No   Lack of insurance?: No   Poor financial resources?: No   Living conditions (homelessness, unstable home)?: No   Poor family/social support?: No   Poor personal care?: No   Food insecurity?: No   Lack of HCPOA?: No      Heart Failure Education   - Living With Heart Failure book provided.  - CHF signs and symptoms, heart failure zones and when to call cardiologist.   - Controlling Heart Failure at Home: medication adherence, following up with cardiologist at least once yearly, staying healthy and active, limiting sodium and fluid intake as directed by cardiologist.  - Daily Weight Education      Current Medications:  Beta blocker: Carvedilol 25mg 2 tablets " "BID  ACE inhibitor/ARB/ARNI: Valsartan 320mg every day   MRA: None  SGLT2i:  None  Diuretic: Torsemide 40 mg daily    Rayneia \"Ray\" Osmany SANTANAN, RN  Heart Failure Clinical Nurse Navigator  [phone]     "

## 2024-10-05 ENCOUNTER — APPOINTMENT (OUTPATIENT)
Dept: DIALYSIS | Facility: HOSPITAL | Age: 54
End: 2024-10-05
Payer: COMMERCIAL

## 2024-10-05 ENCOUNTER — DOCUMENTATION (OUTPATIENT)
Dept: HOME HEALTH SERVICES | Facility: HOME HEALTH | Age: 54
End: 2024-10-05
Payer: COMMERCIAL

## 2024-10-05 VITALS
RESPIRATION RATE: 16 BRPM | TEMPERATURE: 96.6 F | HEIGHT: 55 IN | SYSTOLIC BLOOD PRESSURE: 142 MMHG | BODY MASS INDEX: 26.85 KG/M2 | WEIGHT: 116 LBS | OXYGEN SATURATION: 95 % | DIASTOLIC BLOOD PRESSURE: 96 MMHG | HEART RATE: 85 BPM

## 2024-10-05 PROCEDURE — 99239 HOSP IP/OBS DSCHRG MGMT >30: CPT | Performed by: STUDENT IN AN ORGANIZED HEALTH CARE EDUCATION/TRAINING PROGRAM

## 2024-10-05 PROCEDURE — 94640 AIRWAY INHALATION TREATMENT: CPT

## 2024-10-05 PROCEDURE — 2500000001 HC RX 250 WO HCPCS SELF ADMINISTERED DRUGS (ALT 637 FOR MEDICARE OP): Performed by: NURSE PRACTITIONER

## 2024-10-05 PROCEDURE — 2500000004 HC RX 250 GENERAL PHARMACY W/ HCPCS (ALT 636 FOR OP/ED): Performed by: NURSE PRACTITIONER

## 2024-10-05 PROCEDURE — G0378 HOSPITAL OBSERVATION PER HR: HCPCS

## 2024-10-05 PROCEDURE — 90935 HEMODIALYSIS ONE EVALUATION: CPT | Performed by: INTERNAL MEDICINE

## 2024-10-05 PROCEDURE — 2500000002 HC RX 250 W HCPCS SELF ADMINISTERED DRUGS (ALT 637 FOR MEDICARE OP, ALT 636 FOR OP/ED): Performed by: NURSE PRACTITIONER

## 2024-10-05 PROCEDURE — 96376 TX/PRO/DX INJ SAME DRUG ADON: CPT

## 2024-10-05 PROCEDURE — 90937 HEMODIALYSIS REPEATED EVAL: CPT

## 2024-10-05 RX ORDER — ONDANSETRON HYDROCHLORIDE 2 MG/ML
4 INJECTION, SOLUTION INTRAVENOUS EVERY 4 HOURS PRN
Status: DISCONTINUED | OUTPATIENT
Start: 2024-10-05 | End: 2024-10-05 | Stop reason: HOSPADM

## 2024-10-05 ASSESSMENT — COGNITIVE AND FUNCTIONAL STATUS - GENERAL
DAILY ACTIVITIY SCORE: 24
MOBILITY SCORE: 23
CLIMB 3 TO 5 STEPS WITH RAILING: A LITTLE

## 2024-10-05 ASSESSMENT — PAIN SCALES - GENERAL: PAINLEVEL_OUTOF10: 0 - NO PAIN

## 2024-10-05 ASSESSMENT — PAIN - FUNCTIONAL ASSESSMENT: PAIN_FUNCTIONAL_ASSESSMENT: NO/DENIES PAIN

## 2024-10-05 NOTE — DISCHARGE SUMMARY
Discharge Diagnosis  Hypertensive emergency    Issues Requiring Follow-Up  PCP and Renal    Discharge Meds     Medication List      CONTINUE taking these medications     * albuterol 90 mcg/actuation inhaler; Commonly known as: ProAir HFA;   Inhale 2 puffs every 4 hours if needed for wheezing or shortness of   breath.   * albuterol 1.25 mg/3 mL nebulizer solution; Take 3 mL (1.25 mg) by   nebulization every 6 hours if needed for wheezing or shortness of breath.   apixaban 5 mg tablet; Commonly known as: Eliquis; Take 1 tablet (5 mg)   by mouth 2 times a day.   aspirin 81 mg EC tablet; Take 1 tablet (81 mg) by mouth once daily.   atorvastatin 80 mg tablet; Commonly known as: Lipitor; Take 1 tablet (80   mg) by mouth once daily at bedtime.   Breo Ellipta 100-25 mcg/dose inhaler; Generic drug: fluticasone   furoate-vilanteroL; Inhale 1 puff once daily.   calcium acetate 667 mg capsule; Commonly known as: Phoslo; Take 2   capsules (1,334 mg) by mouth 3 times daily (morning, midday, late   afternoon).   carvedilol 25 mg tablet; Commonly known as: Coreg; Take 2 tablets (50   mg) by mouth 2 times a day.   cloNIDine 0.2 mg tablet; Commonly known as: Catapres; Take 1 tablet (0.2   mg) by mouth 3 times a day.   epoetin joceline 10,000 unit/mL injection; Commonly known as:   Epogen,Procrit; Inject 1 mL (10,000 Units) under the skin 3 times a week.   fluticasone 50 mcg/actuation nasal spray; Commonly known as: Flonase;   Administer 2 sprays into each nostril once daily. Shake gently. Before   first use, prime pump. After use, clean tip and replace cap.   gabapentin 300 mg capsule; Commonly known as: Neurontin; Take 1 capsule   (300 mg) by mouth once daily at bedtime.   hydrALAZINE 100 mg tablet; Commonly known as: Apresoline; Take 1 tablet   (100 mg) by mouth 3 times a day.   hydrOXYzine HCL 25 mg tablet; Commonly known as: Atarax; Take 1 tablet   (25 mg) by mouth every 6 hours if needed for anxiety, allergies or   itching.    isosorbide mononitrate  mg 24 hr tablet; Commonly known as: Imdur;   Take 1 tablet (120 mg) by mouth once daily. Do not crush or chew.   melatonin 5 mg tablet; Take 1 tablet (5 mg) by mouth once daily at   bedtime.   NIFEdipine ER 90 mg 24 hr tablet; Commonly known as: Adalat CC; Take 1   tablet (90 mg) by mouth once daily in the morning. Take before meals. Do   not crush, chew, or split.   pantoprazole 40 mg EC tablet; Commonly known as: ProtoNix; Take 1 tablet   (40 mg) by mouth once daily in the morning. Take before meals. Do not   crush, chew, or split.   polyethylene glycol 17 gram/dose powder; Commonly known as: Glycolax,   Miralax; Take 17 g (1 scoop dissolved in liquid) by mouth once daily. Do   not start before July 26, 2024.   SUMAtriptan 25 mg tablet; Commonly known as: Imitrex; Take 1 tablet (25   mg) by mouth 1 time if needed for migraine. May repeat dose once in 2   hours if no relief.  Do not exceed 2 doses in 24 hours.   torsemide 20 mg tablet; Commonly known as: Demadex; Take 2 tablets once   daily on non-dialysis days only. Do not take on dialysis days   valsartan 320 mg tablet; Commonly known as: Diovan; Take 1 tablet (320   mg) by mouth once daily.   vitamin B complex-vitamin C-folic acid 1 mg capsule; Commonly known as:   Nephrocaps; Take 1 capsule by mouth once daily.  * This list has 2 medication(s) that are the same as other medications   prescribed for you. Read the directions carefully, and ask your doctor or   other care provider to review them with you.       Test Results Pending At Discharge  Pending Labs       No current pending labs.            Hospital Course         Stacey Heath is a 54 y.o. female PMHx of ESRD 2/2 HTN and diabetes (on HD T/Th/Sat via RUE AVF), poorly controlled hypertension, HFpEF, T2DM, COPD, brachial DVT on Eliquis (4/14/2024) who presented to ED  for chest pain and headache  due to missed HD session (last dialysis session Saturday).  Pt was found  Hypertensive, BP improved partially with PO med, Per Renal team, Pt needs to get close to her dry weight to get her HTN better controlled. Renal will also notify outpatient unit to do an extra IUF to try to bring her dry weight down. Per SW, she is good to return sat [facility]. Eliquis was held for 2 days due to nosebleed with a plan to resume 10/5.  Lab was reviewed. Planned for UF with renal 10/4 and walking test afterwar  to determine the need for home O2.  Pt was upset about being discharged and asked for another doctor to address her concerns. Of note, Pt concerns were addressed yesterday. Explained the nosebleed happened is secondary to Eliquis and no concern for DVT or coagulopathy.  Pt was informed that she has renal doctor too on board and that medicine and Renal team are both on this case.      50 min           Pertinent Physical Exam At Time of Discharge  Physical Exam    Outpatient Follow-Up  Future Appointments   Date Time Provider Department Center   10/5/2024 12:00 PM HD CHAIR 7 CMCDialysis [facility]   10/10/2024 12:30 PM Bess Hurst RD [id number] [facility]   11/6/2024 11:00 AM Marbella Rosales APRN-CNP [id number] Academic   11/8/2024 11:00 AM Sherri Gastelum MD [id number] Academic   11/12/2024  4:00 PM Kacey Garzon MD [id number] Academic   1/17/2025 11:40 AM Judy Alcaraz MD [id number] Academic   1/27/2025  4:00 PM Makenna Barraza MD [id number] Academic         Soniya Ruff MD

## 2024-10-05 NOTE — NURSING NOTE
Report to Receiving RN:    Report To: PONCHO Stewart  Time Report Called: [id number]  Hand-Off Communication: Pt completed 3 hrs HD tx, 2L fluid removed, /78, HR 79, no issues, pt stable, A/O.  Complications During Treatment: No  Ultrafiltration Treatment: Yes  Medications Administered During Dialysis: Yes, see Mar  Blood Products Administered During Dialysis: No  Labs Sent During Dialysis: No  Heparin Drip Rate Changes: No      Last Updated: 3:44 PM by ROSARIO MIRAMONTES

## 2024-10-05 NOTE — PROGRESS NOTES
Stacey Heath is a 54 y.o. female on day 0 of admission presenting with Hypertensive emergency.             Objective     Last Recorded Vitals  /69   Pulse 95   Temp 35.7 °C (96.3 °F)   Resp 18   Wt 52.6 kg (116 lb)   SpO2 94%   Intake/Output last 3 Shifts:    Intake/Output Summary (Last 24 hours) at 10/5/2024 1007  Last data filed at 10/4/2024 1704  Gross per 24 hour   Intake 1000 ml   Output 1648 ml   Net -648 ml       Admission Weight  Weight: 52.6 kg (116 lb) (10/01/24 0328)    Daily Weight  10/01/24 : 52.6 kg (116 lb)    Image Results  ECG 12 lead  Normal sinus rhythm  Possible Left atrial enlargement  ST & T wave abnormality, consider inferolateral ischemia  Abnormal ECG  When compared with ECG of 24-SEP-2024 14:41,  ST less depressed in Lateral leads  T wave amplitude has decreased in Anterior leads  T wave inversion less evident in Lateral leads    See ED provider note for full interpretation and clinical correlation  Confirmed by Bibi Toussaint ([id number]) on 10/1/2024 11:57:02 PM  XR chest 2 views  Narrative: Interpreted By:  Mo Jeffrey,   STUDY:  XR CHEST 2 VIEWS;  10/1/2024 4:28 am      INDICATION:  Signs/Symptoms:shortness of breath.          COMPARISON:  09/24/2024      ACCESSION NUMBER(S):  [id number]      ORDERING CLINICIAN:  FARRUKH GIPSON      FINDINGS:  Heart size is enlarged. Features suggesting pulmonary edema. No  pneumothorax. Demineralization of the bones. Vascular calcification.      Impression: 1.  Features suggesting pulmonary edema. Follow-up is advised              MACRO:  None      Signed by: Mo Jeffrey 10/1/2024 4:48 AM  Dictation workstation:   [id number]      Physical Exam    Constitutional:       General: She is not in acute distress.   HENT:      Head: Normocephalic and atraumatic.      Nose: Nose normal.      Mouth/Throat:      Pharynx: Oropharynx is clear.   Cardiovascular:      Rate and Rhythm: Normal rate and regular rhythm.      Pulses: Normal pulses.       Heart sounds: Normal heart sounds.   Pulmonary:      Effort: CTAB   Abdominal:  BS+    Musculoskeletal:      Cervical back: Normal range of motion.      Comments: ELI AVF + thrill and bruit    Skin:     General: Skin is warm.   Neurological:      Mental Status: She is alert and oriented to person, place, and time.   Psychiatric:         Mood and Affect: Mood normal.   Relevant Results               Assessment/Plan                  Assessment & Plan  Hypertensive emergency    ESRD on hemodialysis (Multi)    Subjective         10/3    -Hypertensive, Improved with PO med this PM, Pt needs to get close to her dry weight, also   nutrition consulted to review her diet and limit her fluid gains. Renal will also notify outpatient unit to do an extra IUF to try to bring her dry weight down. She is good to return sat [facility]   -Plan for UF tomorrow  -Held Eliquis due to nosebleed. Will resume in 2 days.   -On RA.  -Can be dc home tmr     10/2    Pt was seen  and examined in HD unit, symptoms have improved since yesterday.  On 3 L NC  Pending further recommendations from renal team regarding resistance HTN.   BP is better controlled.  Can be discharged home in 1-2 days  Pt has no acute event overnight. Addressed all of the patient concerns and explained the treatment plan.     --------------------------------------------------  Hypertensive emergency  HTN  Acute on chronic HFpEF (ef 60-65%, 4/3/24)  HLD  CAD  -BP on arrival 225/100  -continue Aspirin 81 mg daily  -continue Atorvastatin 80mg daily  -continue carvedilol 25mg BID  -continue clonidine 0.2mg TID  -continue hydralazine 100mg TID  -continue isosorbide mononitrate ER 120mg daily  -continue Nifedipine ER 90mg daily  -continue valsartan 320mg daily  -c/w home torsemide 20mg S/M/W/F-- no dialysis days  -Strict I+O, daily weights  -telemetry   -consult nephrology for resistant hypertension     ESRD  -Dialysis T/Th/Sat at [facility]  -continue nephrocaps 1mg  daily  -continue calcium acetate 667mg daily  -consult nephrology dialysis appreciate recs     T2DM  Polyneuropathy   -continue gabapentin 300mg nightly   -stable diet no home meds,  -A1C 5.2, 6/29/24  -Accuchecks no SSI     COPD  -baseline room air  -continue Breo Elipta 100-25mcg/dose daily  -duonebs prn     GERD  -c/w home protonix 40mg daily     DVT  -continue with Eliquis 5mg BID        Fluids: monitor and replete as needed  Electrolytes: monitor and replete as needed  Nutrition: Regular diet   GI prophylaxis: Protonix 40mg QD  DVT prophylaxis: SCD, Eliquis  Code Status: Full Code  PCP  Pharmacy     Disposition: home   50 min                 Soniya Ruff MD

## 2024-10-05 NOTE — HH CARE COORDINATION
Home Care received a referral for Nursing and Medical Social Work. Unfortunately, we are unable to accept and process the referral at this time.    Patients, please reach out to the referring provider or your PCP to assist in obtaining an alternative home care agency and/or guidance to meet your needs.    Providers, please reach out to  Home Care with any questions regarding the declined referral.

## 2024-10-05 NOTE — NURSING NOTE
.Report from Sending RN:    Report From: PONCHO Stewart  Recent Surgery of Procedure: No  Baseline Level of Consciousness (LOC): A&O X3  Oxygen Use: No  Type: Room air  Diabetic: No  Last BP Med Given Day of Dialysis: coreg, clonidine  Last Pain Med Given: none  Lab Tests to be Obtained with Dialysis: No  Blood Transfusion to be Given During Dialysis: No  Available IV Access: Yes  Medications to be Administered During Dialysis: No  Continuous IV Infusion Running: No  Restraints on Currently or in the Last 24 Hours: No  Hand-Off Communication: Pt had no acute event this morning, vital signs stable. Not on precaution, all morning medication given.  Dialysis Catheter Dressing: AVF  Last Dressing Change: N/A

## 2024-10-05 NOTE — PROGRESS NOTES
10/05/24 1609   Discharge Planning   Expected Discharge Disposition Home Health  (Medicaid Community Clinical Case Management)     Per [facility] intake they are unable to accept home care referral as pt is active with Medicaid Community Clinical Case Management for nursing and SW visits, per [facility] pt was visited at the hospital by SW with the above program yesterday. Care coordinator will continue to follow for discharge planning needs.    Dorothy Ramos RN  Transitional Care Coordinator (TCC)  [phone] or [phone]

## 2024-10-06 NOTE — PROCEDURES
"DIALYSIS NOTE:    Seen and examined during hemodialysis, undergoing treatment per submitted orders: 2 K, .5 Ca, 3  hours. Fluid removal 2 liters, as tolerated (keep SBP> 90mmHg).     BP (!) 142/96   Pulse 85   Temp 35.9 °C (96.6 °F)   Resp 16   Ht 1.397 m (4' 7\")   Wt 52.6 kg (116 lb)   SpO2 95%   BMI 26.96 kg/m²         "

## 2024-10-07 ENCOUNTER — APPOINTMENT (OUTPATIENT)
Dept: RADIOLOGY | Facility: HOSPITAL | Age: 54
End: 2024-10-07
Payer: COMMERCIAL

## 2024-10-07 ENCOUNTER — CLINICAL SUPPORT (OUTPATIENT)
Dept: EMERGENCY MEDICINE | Facility: HOSPITAL | Age: 54
End: 2024-10-07
Payer: COMMERCIAL

## 2024-10-07 ENCOUNTER — APPOINTMENT (OUTPATIENT)
Dept: NEPHROLOGY | Facility: CLINIC | Age: 54
End: 2024-10-07
Payer: COMMERCIAL

## 2024-10-07 ENCOUNTER — APPOINTMENT (OUTPATIENT)
Dept: DIALYSIS | Facility: HOSPITAL | Age: 54
End: 2024-10-07
Payer: COMMERCIAL

## 2024-10-07 ENCOUNTER — HOSPITAL ENCOUNTER (EMERGENCY)
Facility: HOSPITAL | Age: 54
Discharge: HOME | End: 2024-10-07
Attending: EMERGENCY MEDICINE
Payer: COMMERCIAL

## 2024-10-07 VITALS
HEIGHT: 55 IN | DIASTOLIC BLOOD PRESSURE: 73 MMHG | BODY MASS INDEX: 26.85 KG/M2 | HEART RATE: 81 BPM | WEIGHT: 116 LBS | RESPIRATION RATE: 17 BRPM | SYSTOLIC BLOOD PRESSURE: 168 MMHG | OXYGEN SATURATION: 99 % | TEMPERATURE: 98.2 F

## 2024-10-07 DIAGNOSIS — R79.89 ELEVATED TROPONIN: ICD-10-CM

## 2024-10-07 DIAGNOSIS — E87.5 HYPERKALEMIA: ICD-10-CM

## 2024-10-07 DIAGNOSIS — I1A.0 RESISTANT HYPERTENSION: Primary | ICD-10-CM

## 2024-10-07 LAB
ALBUMIN SERPL BCP-MCNC: 4.3 G/DL (ref 3.4–5)
ALP SERPL-CCNC: 142 U/L (ref 33–110)
ALT SERPL W P-5'-P-CCNC: 29 U/L (ref 7–45)
ANION GAP SERPL CALC-SCNC: 21 MMOL/L (ref 10–20)
AST SERPL W P-5'-P-CCNC: 38 U/L (ref 9–39)
ATRIAL RATE: 85 BPM
BASOPHILS # BLD AUTO: 0.11 X10*3/UL (ref 0–0.1)
BASOPHILS NFR BLD AUTO: 1.5 %
BILIRUB SERPL-MCNC: 0.5 MG/DL (ref 0–1.2)
BNP SERPL-MCNC: 1309 PG/ML (ref 0–99)
BUN SERPL-MCNC: 61 MG/DL (ref 6–23)
CALCIUM SERPL-MCNC: 10.8 MG/DL (ref 8.6–10.6)
CARDIAC TROPONIN I PNL SERPL HS: 51 NG/L (ref 0–34)
CARDIAC TROPONIN I PNL SERPL HS: 56 NG/L (ref 0–34)
CHLORIDE SERPL-SCNC: 98 MMOL/L (ref 98–107)
CO2 SERPL-SCNC: 27 MMOL/L (ref 21–32)
CREAT SERPL-MCNC: 8.57 MG/DL (ref 0.5–1.05)
EGFRCR SERPLBLD CKD-EPI 2021: 5 ML/MIN/1.73M*2
EOSINOPHIL # BLD AUTO: 0.8 X10*3/UL (ref 0–0.7)
EOSINOPHIL NFR BLD AUTO: 10.8 %
ERYTHROCYTE [DISTWIDTH] IN BLOOD BY AUTOMATED COUNT: 16.3 % (ref 11.5–14.5)
GLUCOSE BLD MANUAL STRIP-MCNC: 123 MG/DL (ref 74–99)
GLUCOSE SERPL-MCNC: 84 MG/DL (ref 74–99)
HCT VFR BLD AUTO: 34.7 % (ref 36–46)
HGB BLD-MCNC: 10.3 G/DL (ref 12–16)
HOLD SPECIMEN: NORMAL
IMM GRANULOCYTES # BLD AUTO: 0.03 X10*3/UL (ref 0–0.7)
IMM GRANULOCYTES NFR BLD AUTO: 0.4 % (ref 0–0.9)
INR PPP: 1.1 (ref 0.9–1.1)
LYMPHOCYTES # BLD AUTO: 1.05 X10*3/UL (ref 1.2–4.8)
LYMPHOCYTES NFR BLD AUTO: 14.2 %
MCH RBC QN AUTO: 29.3 PG (ref 26–34)
MCHC RBC AUTO-ENTMCNC: 29.7 G/DL (ref 32–36)
MCV RBC AUTO: 99 FL (ref 80–100)
MONOCYTES # BLD AUTO: 0.31 X10*3/UL (ref 0.1–1)
MONOCYTES NFR BLD AUTO: 4.2 %
NEUTROPHILS # BLD AUTO: 5.1 X10*3/UL (ref 1.2–7.7)
NEUTROPHILS NFR BLD AUTO: 68.9 %
NRBC BLD-RTO: 0 /100 WBCS (ref 0–0)
P AXIS: 47 DEGREES
P OFFSET: 194 MS
P ONSET: 145 MS
PLATELET # BLD AUTO: 217 X10*3/UL (ref 150–450)
POTASSIUM SERPL-SCNC: 5.8 MMOL/L (ref 3.5–5.3)
POTASSIUM SERPL-SCNC: 6 MMOL/L (ref 3.5–5.3)
PR INTERVAL: 148 MS
PROT SERPL-MCNC: 8.5 G/DL (ref 6.4–8.2)
PROTHROMBIN TIME: 12.9 SECONDS (ref 9.8–12.8)
Q ONSET: 219 MS
QRS COUNT: 14 BEATS
QRS DURATION: 86 MS
QT INTERVAL: 370 MS
QTC CALCULATION(BAZETT): 440 MS
QTC FREDERICIA: 415 MS
R AXIS: 20 DEGREES
RBC # BLD AUTO: 3.52 X10*6/UL (ref 4–5.2)
SODIUM SERPL-SCNC: 140 MMOL/L (ref 136–145)
T AXIS: 159 DEGREES
T OFFSET: 404 MS
VENTRICULAR RATE: 85 BPM
WBC # BLD AUTO: 7.4 X10*3/UL (ref 4.4–11.3)

## 2024-10-07 PROCEDURE — 2500000005 HC RX 250 GENERAL PHARMACY W/O HCPCS: Performed by: PHYSICIAN ASSISTANT

## 2024-10-07 PROCEDURE — 84484 ASSAY OF TROPONIN QUANT: CPT | Performed by: EMERGENCY MEDICINE

## 2024-10-07 PROCEDURE — 36415 COLL VENOUS BLD VENIPUNCTURE: CPT | Performed by: EMERGENCY MEDICINE

## 2024-10-07 PROCEDURE — 2500000002 HC RX 250 W HCPCS SELF ADMINISTERED DRUGS (ALT 637 FOR MEDICARE OP, ALT 636 FOR OP/ED): Performed by: PHYSICIAN ASSISTANT

## 2024-10-07 PROCEDURE — 70450 CT HEAD/BRAIN W/O DYE: CPT | Performed by: RADIOLOGY

## 2024-10-07 PROCEDURE — 93005 ELECTROCARDIOGRAM TRACING: CPT | Mod: 59

## 2024-10-07 PROCEDURE — 99222 1ST HOSP IP/OBS MODERATE 55: CPT | Performed by: INTERNAL MEDICINE

## 2024-10-07 PROCEDURE — 2500000001 HC RX 250 WO HCPCS SELF ADMINISTERED DRUGS (ALT 637 FOR MEDICARE OP)

## 2024-10-07 PROCEDURE — 99285 EMERGENCY DEPT VISIT HI MDM: CPT | Mod: 25

## 2024-10-07 PROCEDURE — 36410 VNPNXR 3YR/> PHY/QHP DX/THER: CPT | Mod: 59

## 2024-10-07 PROCEDURE — 84132 ASSAY OF SERUM POTASSIUM: CPT | Performed by: EMERGENCY MEDICINE

## 2024-10-07 PROCEDURE — 83880 ASSAY OF NATRIURETIC PEPTIDE: CPT | Performed by: EMERGENCY MEDICINE

## 2024-10-07 PROCEDURE — 85025 COMPLETE CBC W/AUTO DIFF WBC: CPT | Performed by: EMERGENCY MEDICINE

## 2024-10-07 PROCEDURE — 90935 HEMODIALYSIS ONE EVALUATION: CPT | Performed by: INTERNAL MEDICINE

## 2024-10-07 PROCEDURE — 2500000001 HC RX 250 WO HCPCS SELF ADMINISTERED DRUGS (ALT 637 FOR MEDICARE OP): Performed by: EMERGENCY MEDICINE

## 2024-10-07 PROCEDURE — 71046 X-RAY EXAM CHEST 2 VIEWS: CPT

## 2024-10-07 PROCEDURE — 2500000004 HC RX 250 GENERAL PHARMACY W/ HCPCS (ALT 636 FOR OP/ED): Performed by: PHYSICIAN ASSISTANT

## 2024-10-07 PROCEDURE — 90937 HEMODIALYSIS REPEATED EVAL: CPT

## 2024-10-07 PROCEDURE — 94640 AIRWAY INHALATION TREATMENT: CPT

## 2024-10-07 PROCEDURE — 85610 PROTHROMBIN TIME: CPT | Performed by: EMERGENCY MEDICINE

## 2024-10-07 PROCEDURE — 93010 ELECTROCARDIOGRAM REPORT: CPT | Performed by: PHYSICIAN ASSISTANT

## 2024-10-07 PROCEDURE — 96374 THER/PROPH/DIAG INJ IV PUSH: CPT | Mod: 59

## 2024-10-07 PROCEDURE — 96375 TX/PRO/DX INJ NEW DRUG ADDON: CPT | Mod: 59

## 2024-10-07 PROCEDURE — 80053 COMPREHEN METABOLIC PANEL: CPT | Performed by: EMERGENCY MEDICINE

## 2024-10-07 PROCEDURE — 70450 CT HEAD/BRAIN W/O DYE: CPT

## 2024-10-07 PROCEDURE — 71046 X-RAY EXAM CHEST 2 VIEWS: CPT | Mod: FOREIGN READ | Performed by: RADIOLOGY

## 2024-10-07 PROCEDURE — 99285 EMERGENCY DEPT VISIT HI MDM: CPT | Performed by: PHYSICIAN ASSISTANT

## 2024-10-07 PROCEDURE — 2500000001 HC RX 250 WO HCPCS SELF ADMINISTERED DRUGS (ALT 637 FOR MEDICARE OP): Performed by: PHYSICIAN ASSISTANT

## 2024-10-07 PROCEDURE — 82947 ASSAY GLUCOSE BLOOD QUANT: CPT

## 2024-10-07 RX ORDER — FUROSEMIDE 10 MG/ML
40 INJECTION INTRAMUSCULAR; INTRAVENOUS ONCE
Status: COMPLETED | OUTPATIENT
Start: 2024-10-07 | End: 2024-10-07

## 2024-10-07 RX ORDER — NIFEDIPINE 30 MG/1
90 TABLET, FILM COATED, EXTENDED RELEASE ORAL ONCE
Status: COMPLETED | OUTPATIENT
Start: 2024-10-07 | End: 2024-10-07

## 2024-10-07 RX ORDER — CLONIDINE HYDROCHLORIDE 0.1 MG/1
0.2 TABLET ORAL ONCE
Status: COMPLETED | OUTPATIENT
Start: 2024-10-07 | End: 2024-10-07

## 2024-10-07 RX ORDER — NAPROXEN SODIUM 220 MG/1
TABLET, FILM COATED ORAL
Status: COMPLETED
Start: 2024-10-07 | End: 2024-10-07

## 2024-10-07 RX ORDER — CARVEDILOL 12.5 MG/1
25 TABLET ORAL ONCE
Status: COMPLETED | OUTPATIENT
Start: 2024-10-07 | End: 2024-10-07

## 2024-10-07 RX ORDER — ALBUTEROL SULFATE 0.83 MG/ML
10 SOLUTION RESPIRATORY (INHALATION) ONCE
Status: COMPLETED | OUTPATIENT
Start: 2024-10-07 | End: 2024-10-07

## 2024-10-07 RX ORDER — PARICALCITOL 5 UG/ML
5 INJECTION, SOLUTION INTRAVENOUS 3 TIMES WEEKLY
Status: DISCONTINUED | OUTPATIENT
Start: 2024-10-08 | End: 2024-10-07 | Stop reason: HOSPADM

## 2024-10-07 RX ORDER — ISOSORBIDE MONONITRATE 30 MG/1
120 TABLET, EXTENDED RELEASE ORAL ONCE
Status: COMPLETED | OUTPATIENT
Start: 2024-10-07 | End: 2024-10-07

## 2024-10-07 RX ORDER — HYDRALAZINE HYDROCHLORIDE 25 MG/1
100 TABLET, FILM COATED ORAL ONCE
Status: COMPLETED | OUTPATIENT
Start: 2024-10-07 | End: 2024-10-07

## 2024-10-07 RX ORDER — ACETAMINOPHEN 325 MG/1
650 TABLET ORAL ONCE
Status: COMPLETED | OUTPATIENT
Start: 2024-10-07 | End: 2024-10-07

## 2024-10-07 RX ORDER — NITROGLYCERIN 0.4 MG/1
0.4 TABLET SUBLINGUAL EVERY 5 MIN PRN
Status: DISCONTINUED | OUTPATIENT
Start: 2024-10-07 | End: 2024-10-07 | Stop reason: HOSPADM

## 2024-10-07 RX ORDER — NAPROXEN SODIUM 220 MG/1
324 TABLET, FILM COATED ORAL ONCE
Status: COMPLETED | OUTPATIENT
Start: 2024-10-07 | End: 2024-10-07

## 2024-10-07 RX ORDER — CALCIUM GLUCONATE 20 MG/ML
2 INJECTION, SOLUTION INTRAVENOUS ONCE
Status: COMPLETED | OUTPATIENT
Start: 2024-10-07 | End: 2024-10-07

## 2024-10-07 RX ORDER — DEXTROSE 50 % IN WATER (D50W) INTRAVENOUS SYRINGE
25 ONCE
Status: COMPLETED | OUTPATIENT
Start: 2024-10-07 | End: 2024-10-07

## 2024-10-07 RX ORDER — DEXTROSE 50 % IN WATER (D50W) INTRAVENOUS SYRINGE
25 ONCE AS NEEDED
Status: DISCONTINUED | OUTPATIENT
Start: 2024-10-07 | End: 2024-10-07 | Stop reason: HOSPADM

## 2024-10-07 RX ORDER — ONDANSETRON HYDROCHLORIDE 2 MG/ML
4 INJECTION, SOLUTION INTRAVENOUS ONCE
Status: COMPLETED | OUTPATIENT
Start: 2024-10-07 | End: 2024-10-07

## 2024-10-07 RX ADMIN — ACETAMINOPHEN 650 MG: 325 TABLET ORAL at 11:47

## 2024-10-07 RX ADMIN — ISOSORBIDE MONONITRATE 120 MG: 30 TABLET, EXTENDED RELEASE ORAL at 11:48

## 2024-10-07 RX ADMIN — ACETAMINOPHEN 650 MG: 325 TABLET ORAL at 20:43

## 2024-10-07 RX ADMIN — NAPROXEN SODIUM 324 MG: 220 TABLET, FILM COATED ORAL at 11:05

## 2024-10-07 RX ADMIN — ALBUTEROL SULFATE 10 MG: 2.5 SOLUTION RESPIRATORY (INHALATION) at 15:09

## 2024-10-07 RX ADMIN — ONDANSETRON 4 MG: 2 INJECTION INTRAMUSCULAR; INTRAVENOUS at 11:38

## 2024-10-07 RX ADMIN — DEXTROSE MONOHYDRATE 25 G: 25 INJECTION, SOLUTION INTRAVENOUS at 15:11

## 2024-10-07 RX ADMIN — HYDRALAZINE HYDROCHLORIDE 100 MG: 25 TABLET ORAL at 13:11

## 2024-10-07 RX ADMIN — INSULIN HUMAN 5 UNITS: 100 INJECTION, SOLUTION PARENTERAL at 15:10

## 2024-10-07 RX ADMIN — CLONIDINE HYDROCHLORIDE 0.2 MG: 0.1 TABLET ORAL at 13:10

## 2024-10-07 RX ADMIN — ASCORBIC ACID, THIAMINE MONONITRATE,RIBOFLAVIN, NIACINAMIDE, PYRIDOXINE HYDROCHLORIDE, FOLIC ACID, CYANOCOBALAMIN, BIOTIN, CALCIUM PANTOTHENATE, 1 CAPSULE: 100; 1.5; 1.7; 20; 10; 1; 6000; 150000; 5 CAPSULE, LIQUID FILLED ORAL at 20:08

## 2024-10-07 RX ADMIN — NITROGLYCERIN 0.4 MG: 0.4 TABLET SUBLINGUAL at 11:03

## 2024-10-07 RX ADMIN — ASPIRIN 81 MG 324 MG: 81 TABLET ORAL at 11:05

## 2024-10-07 RX ADMIN — NITROGLYCERIN 0.4 MG: 0.4 TABLET SUBLINGUAL at 11:20

## 2024-10-07 RX ADMIN — CARVEDILOL 25 MG: 12.5 TABLET, FILM COATED ORAL at 11:47

## 2024-10-07 RX ADMIN — FUROSEMIDE 40 MG: 10 INJECTION, SOLUTION INTRAMUSCULAR; INTRAVENOUS at 11:37

## 2024-10-07 RX ADMIN — CALCIUM GLUCONATE 2 G: 20 INJECTION, SOLUTION INTRAVENOUS at 15:04

## 2024-10-07 RX ADMIN — NIFEDIPINE 90 MG: 30 TABLET, FILM COATED, EXTENDED RELEASE ORAL at 13:11

## 2024-10-07 SDOH — HEALTH STABILITY: MENTAL HEALTH: HAVE YOU WISHED YOU WERE DEAD OR WISHED YOU COULD GO TO SLEEP AND NOT WAKE UP?: NO

## 2024-10-07 SDOH — HEALTH STABILITY: MENTAL HEALTH: SUICIDE ASSESSMENT: ADULT (C-SSRS)

## 2024-10-07 SDOH — HEALTH STABILITY: MENTAL HEALTH: BEHAVIORS/MOOD: CALM

## 2024-10-07 SDOH — HEALTH STABILITY: MENTAL HEALTH: NEEDS EXPRESSED: DENIES

## 2024-10-07 SDOH — HEALTH STABILITY: MENTAL HEALTH: HAVE YOU ACTUALLY HAD ANY THOUGHTS OF KILLING YOURSELF?: NO

## 2024-10-07 SDOH — HEALTH STABILITY: MENTAL HEALTH: HAVE YOU EVER DONE ANYTHING, STARTED TO DO ANYTHING, OR PREPARED TO DO ANYTHING TO END YOUR LIFE?: NO

## 2024-10-07 SDOH — HEALTH STABILITY: MENTAL HEALTH: BEHAVIORAL HEALTH(WDL): WITHIN DEFINED LIMITS

## 2024-10-07 ASSESSMENT — PAIN DESCRIPTION - LOCATION
LOCATION: HEAD
LOCATION: HEAD

## 2024-10-07 ASSESSMENT — PAIN SCALES - GENERAL
PAINLEVEL_OUTOF10: 0 - NO PAIN
PAINLEVEL_OUTOF10: 7
PAINLEVEL_OUTOF10: 7
PAINLEVEL_OUTOF10: 3
PAINLEVEL_OUTOF10: 7

## 2024-10-07 ASSESSMENT — COLUMBIA-SUICIDE SEVERITY RATING SCALE - C-SSRS
6. HAVE YOU EVER DONE ANYTHING, STARTED TO DO ANYTHING, OR PREPARED TO DO ANYTHING TO END YOUR LIFE?: NO
4. HAVE YOU HAD THESE THOUGHTS AND HAD SOME INTENTION OF ACTING ON THEM?: NO
1. IN THE PAST MONTH, HAVE YOU WISHED YOU WERE DEAD OR WISHED YOU COULD GO TO SLEEP AND NOT WAKE UP?: NO
5. HAVE YOU STARTED TO WORK OUT OR WORKED OUT THE DETAILS OF HOW TO KILL YOURSELF? DO YOU INTEND TO CARRY OUT THIS PLAN?: NO
2. HAVE YOU ACTUALLY HAD ANY THOUGHTS OF KILLING YOURSELF?: NO

## 2024-10-07 ASSESSMENT — PAIN DESCRIPTION - PAIN TYPE: TYPE: ACUTE PAIN

## 2024-10-07 ASSESSMENT — PAIN DESCRIPTION - DESCRIPTORS: DESCRIPTORS: ACHING;PRESSURE

## 2024-10-07 NOTE — CONSULTS
Inpatient consult to Nephrology Dialysis  Consult performed by: Abdulaziz Carpenter MD  Consult ordered by: Dimple Major PA-C      NEPHROLOGY NEW CONSULT NOTE   Patient ID: Stacey Heath is a 54 y.o. female.     Reason for consult: ESRD-HD    Chief Complaint   Patient presents with    Shortness of Breath        Stacey Heath is a 54 y.o. female   - With past medical Hx as below   - Admitted for shortness of breath, recently discharged from   - Nephrology was consulted for ESRD management   She gets dialysis TTS at [facility]/Dr Barraza is primary nephrologist. Last HD 10/5 no post weight recorded; DW 51; ELI AVF  K 6.0; Hgb 10.3; /77; Mircera 150 mcg Q 2 weeks last dose 9/12     Past Medical History:   Diagnosis Date    Bacteremia due to methicillin resistant Staphylococcus aureus 07/08/2024    Cardiac/pericardial tamponade 01/20/2024    CHF (congestive heart failure)     COPD (chronic obstructive pulmonary disease) (Multi)     Coronary artery disease     Disorder of sweat glands 02/25/2023    Dry eye syndrome of bilateral lacrimal glands 03/07/2017    Dry eyes    ESRD (end stage renal disease) (Multi)     Essential (primary) hypertension 12/27/2022    Hypertension    History of acute pancreatitis 12/21/2020    History of acute pancreatitis    Low grade squamous intraepithelial lesion (LGSIL) on cervicovaginal cytologic smear 02/25/2023    Migraines     History of migraine    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    Type 2 myocardial infarction (Multi) 01/20/2024      Past Surgical History:   Procedure Laterality Date    APPENDECTOMY  03/07/2017    Appendectomy    CT ABDOMEN ANGIOGRAM W AND/OR WO IV CONTRAST  4/23/2023    CT ABDOMEN ANGIOGRAM W AND/OR WO IV CONTRAST [facility] CT    OTHER SURGICAL HISTORY  03/07/2017    Cystoscopy With Pyeloscopy With Removal Of Calculus    OTHER SURGICAL HISTORY  03/07/2017    Anoscopy For Polyp Removal    OTHER SURGICAL HISTORY  12/08/2021    Arteriovenous fistula  "creation procedure    OTHER SURGICAL HISTORY  12/08/2021    Dialysis tunneled catheter placement    TUBAL LIGATION  03/07/2017    Tubal Ligation      Family History   Problem Relation Name Age of Onset    Other (Cerebrovascular Accident) Father      Heart failure Paternal Grandmother      Breast cancer Paternal Grandmother      Other (Primary Cervical Cancer) Paternal Grandmother      Diabetes Paternal Grandfather      Breast cancer Father's Sister      Ovarian cancer Father's Sister           Allergies   Allergen Reactions    Iodine Hives, Itching and Unknown    Bee Pollen Unknown    Bioflavonoids Swelling    Citrus And Derivatives Unknown    Codeine Itching, Hives and Unknown     Tolerates percocet   Tolerates percocet    Tolerates percocet    Flowers Itching    Hydromorphone Itching    Shellfish Containing Products Swelling     SEAFOOD        Scheduled medications  [START ON 10/8/2024] paricalcitol, 5 mcg, intravenous, Once per day on Tuesday Thursday Saturday  vitamin B complex-vitamin C-folic acid, 1 capsule, oral, Daily      Continuous medications     PRN medications  PRN medications: dextrose, nitroglycerin     Heart Rate:  [75-91]   Temperature:  [36 °C (96.8 °F)-36.6 °C (97.8 °F)]   Respirations:  [16-20]   BP: (169-249)/()   Height:  [139.7 cm (4' 7\")]   Weight:  [52.6 kg (116 lb)]   Pulse Ox:  [94 %-100 %]    Weight: 52.6 kg (116 lb)   General appearance: Awake and alert, oriented, . No distress  HEENT: supple, moist oral mucosa, no mouth ulcers  Neck: No JVD  Skin: no apparent rash  Heart: heart sounds 1 & 2 present and normal, no murmurs heard or friction rub  Lungs: Adequate air entry. Occ crackles  Abdomen: soft, non tender, no masses palpated, no flank tenderness  Extremities: No  edema, no joint swelling,  ACCESS: RT AV fistula  Results from last 72 hours   Lab Units 10/07/24  1300 10/07/24  1027   SODIUM mmol/L  --  140   POTASSIUM mmol/L 6.0* 5.8*   CO2 mmol/L  --  27   BUN mg/dL  --  61* "   CREATININE mg/dL  --  8.57*   CALCIUM mg/dL  --  10.8*   ALBUMIN g/dL  --  4.3   GLUCOSE mg/dL  --  84   WBC AUTO x10*3/uL  --  7.4        A/P  ESRD-HD admitted with shortness of breath, noted to be hyperkalemic on admission despite HD on 10//5    Patient seen and examined while on dialysis, tolerating well. Labs, medications, recent events reviewed. Will follow overall management per primary team and continue regular dialysis while in house.   Access: no issues   BP: acceptable during treatment   Renal Diet   Daily renal MVI   Continue 3 x per week hemodialysis; SW to ensure availability of o/p HD chair at discharge    Abdulaziz Carpenter MD

## 2024-10-07 NOTE — NURSING NOTE
Report from Sending RN:    Report From: Aleta Rubio RN)  Recent Surgery of Procedure: No  Baseline Level of Consciousness (LOC): a/o x 4  Oxygen Use: Yes, 3 liters  Type: nc  Diabetic: No  Last BP Med Given Day of Dialysis: see mar  Last Pain Med Given: see mar  Lab Tests to be Obtained with Dialysis: No  Blood Transfusion to be Given During Dialysis: No  Available IV Access: Yes  Medications to be Administered During Dialysis: No  Continuous IV Infusion Running: No  Restraints on Currently or in the Last 24 Hours: No  Hand-Off Communication: No acute overnight or morning events; vss; Pt did take morning medications; Pt will not need labs; Pt is a full code; Cassidy Gonzalez RN.  Dialysis Catheter Dressing: right AVF  Last Dressing Change: yes

## 2024-10-07 NOTE — ED TRIAGE NOTES
Complaining of SOB, CP that began this morning. HX of ESRD (HD TTHS, completed a full session on Sat), COPD, asthma

## 2024-10-07 NOTE — NURSING NOTE
Report to Receiving RN:    Report To: Aleta ISAAC  Time Report Called: 1920 per secure chat  Hand-Off Communication: tolerated treatment, 2 liters of fluid removed, VSS, denies C/O  Complications During Treatment: No  Ultrafiltration Treatment: No  Medications Administered During Dialysis: No  Blood Products Administered During Dialysis: No  Labs Sent During Dialysis: No  Heparin Drip Rate Changes: N/A  Dialysis Catheter Dressing: N/A  Last Dressing Change: N/A

## 2024-10-07 NOTE — ED PROCEDURE NOTE
Procedure  General    Performed by: Kem Herrmann DO  Authorized by: Lo Srivastava MD    Consent:     Consent obtained:  Verbal    Consent given by:  Patient    Risks discussed:  Bleeding, infection and pain  Pre-procedure details:     Skin preparation:  Chlorhexidine  Procedure specific details:      Peripheral Ultrasound guided IV access             Lo Srivastava MD  10/09/24 1044

## 2024-10-08 ENCOUNTER — DOCUMENTATION (OUTPATIENT)
Dept: BEHAVIORAL HEALTH | Facility: CLINIC | Age: 54
End: 2024-10-08
Payer: COMMERCIAL

## 2024-10-08 NOTE — DISCHARGE INSTRUCTIONS
Remember you are welcome back in the emergency department at any point if you need review develop any new or worsening symptoms.  Make sure you are taking your home blood pressure medications as prescribed.

## 2024-10-09 ENCOUNTER — DOCUMENTATION (OUTPATIENT)
Dept: BEHAVIORAL HEALTH | Facility: CLINIC | Age: 54
End: 2024-10-09
Payer: COMMERCIAL

## 2024-10-09 DIAGNOSIS — F41.9 ANXIETY: ICD-10-CM

## 2024-10-09 PROCEDURE — H2019 THER BEHAV SVC, PER 15 MIN: HCPCS | Mod: HE | Performed by: COMMUNITY/BEHAVIORAL HEALTH

## 2024-10-09 NOTE — PROGRESS NOTES
Stacey Heath  Age: 54 y.o.  MRN: [id number]  Date: 10/9/2024  Location of service: in community    Program Details  Medicaid Community Clinical Case Management  Status: Enrolled  Effective Dates: 10/17/2023 - present  Responsible Staff: NAREN Davidson      Goals Reviewed:  Problem: Anxiety       Goal: Attempts to manage anxiety with help       Priority: High         Goal: Verbalizes ways to manage anxiety       Priority: High         Goal: Implements measures to reduce anxiety       Priority: High        Problem: Financial Stressors       Goal: Assistance with financial concerns         Problem: Kidney Issues       Goal: Improve health to get on kidney transplant list       Priority: High        Problem: Negative Experience, Conflict with, or Distrust of Providers and/or Health System       Goal: Plan to Address Patient Specific Negative Experience, Distrust, or Conflict with Providers and/or Health System       Priority: High        Problem: Risk of Uncoordinated Care       Goal: Care will be Coordinated and Supported by a Multidisciplinary Team of Providers       Priority: High          Summary:  This provider met with patient at the patient's home. This provider listened with empathy as patient talked about having to go to the [facility] ED on Monday. Patient expressed that she is coming down with a cold and is trying to stay home as much as possible. This provider did a check in to see how patient has been doing regarding her low sodium diet. Patient explained that she is trying to be more mindful with her meal choices. Patient also express that she is feeling a higher level of stress than before. This provider validated patient's thoughts and inquired about coping skills.                      NAREN Davidson

## 2024-10-09 NOTE — PROGRESS NOTES
Stacey Heath  Age: 54 y.o.  MRN: [id number]  Date: 10/8/2024  Location of service: in community    Program Details  Medicaid Community Clinical Case Management  Status: Enrolled  Effective Dates: 10/17/2023 - present  Responsible Staff: NAREN Davidson      Goals Reviewed:      Summary:  This writer meets with patient at her dialysis center for an appointment.   Patient discusses her recent hospitalization.   This writer schedules patient for an appointment next week. We plan to review patient's medications visually.     Appointment start time: 1207  Appointment completion time: 1227  Total time spent with patient (in minutes): 20  Non-Billable Time: 20  Billable Time Total: 0    Roberta Gallego RN

## 2024-10-10 ENCOUNTER — APPOINTMENT (OUTPATIENT)
Dept: CARE COORDINATION | Facility: CLINIC | Age: 54
End: 2024-10-10
Payer: COMMERCIAL

## 2024-10-11 ENCOUNTER — PATIENT OUTREACH (OUTPATIENT)
Dept: CARE COORDINATION | Facility: CLINIC | Age: 54
End: 2024-10-11
Payer: COMMERCIAL

## 2024-10-11 NOTE — PROGRESS NOTES
This RD called pt to inform her of her rescheduled fuv. LVM with appt details and appt date of 10/16 at 2:30

## 2024-10-16 ENCOUNTER — DOCUMENTATION (OUTPATIENT)
Dept: BEHAVIORAL HEALTH | Facility: CLINIC | Age: 54
End: 2024-10-16

## 2024-10-16 ENCOUNTER — APPOINTMENT (OUTPATIENT)
Dept: CARE COORDINATION | Facility: CLINIC | Age: 54
End: 2024-10-16
Payer: COMMERCIAL

## 2024-10-16 DIAGNOSIS — F41.9 ANXIETY: ICD-10-CM

## 2024-10-16 PROCEDURE — H2019 THER BEHAV SVC, PER 15 MIN: HCPCS | Mod: HE | Performed by: COMMUNITY/BEHAVIORAL HEALTH

## 2024-10-16 NOTE — PROGRESS NOTES
Stacey Heath  Age: 54 y.o.  MRN: [id number]  Date: 10/16/2024  Location of service: in community    Program Details  Medicaid Community Clinical Case Management  Status: Enrolled  Effective Dates: 10/17/2023 - present  Responsible Staff: NAREN Davidson      Goals Reviewed:  Problem: Anxiety       Goal: Attempts to manage anxiety with help       Priority: High         Goal: Verbalizes ways to manage anxiety       Priority: High         Goal: Implements measures to reduce anxiety       Priority: High        Problem: Financial Stressors       Goal: Assistance with financial concerns               Summary:  This provider met with patient at the patient's home. This provider listened with empathy as patient explained learning about the recent passing of a friend. Patient also reports having energy today that allowed her to go to the store and clean the kitchen. Patient also reported that she has been trying very hard with healthy meal choices. This provider assisted patient with calling the [facility] [employer] for assisted with getting her SNAP benefits reinstated. This provider also reviewed a letter that the patient received from [facility] regarding a medical insurance change. Provider and patient discussed the changes and benefits. This provider also supplied the patient with a medicine pill container to help keep prescriptions organized.                    NAREN Davidson

## 2024-10-17 NOTE — PROGRESS NOTES
Stacey Heath  Age: 54 y.o.  MRN: [id number]  Date: 10/16/2024  Location of service: in community    Program Details  Medicaid Community Clinical Case Management  Status: Enrolled  Effective Dates: 10/17/2023 - present  Responsible Staff: NAREN Davidson      Goals Reviewed:  Problem: Kidney Issues       Goal: Improve health to get on kidney transplant list       Priority: High        Problem: Risk of Uncoordinated Care       Goal: Care will be Coordinated and Supported by a Multidisciplinary Team of Providers       Priority: High          Summary:  Patient discusses her frustrations with not talking with transplant.  Patient states she was able to get her food stamps handled.  Patient states she was placed on [employer] [place] plan. This writer explains the program.  Patient discusses the death of her friend's son.  Patient discusses her daughter and family at length.    Billable:  Patient states she has been sticking to a low sodium diet. She states she has been paying attention to nutrition facts. Patient describes some of the foods she has been eating.    Appointment start time: 1224  Appointment completion time: 1346  Total time spent with patient (in minutes): 82  Non-Billable Time: 65  Billable Time Total: 17    Roberta Gallego RN

## 2024-10-21 ENCOUNTER — HOSPITAL ENCOUNTER (INPATIENT)
Facility: HOSPITAL | Age: 54
LOS: 3 days | Discharge: HOME | End: 2024-10-24
Attending: STUDENT IN AN ORGANIZED HEALTH CARE EDUCATION/TRAINING PROGRAM | Admitting: STUDENT IN AN ORGANIZED HEALTH CARE EDUCATION/TRAINING PROGRAM
Payer: COMMERCIAL

## 2024-10-21 ENCOUNTER — CLINICAL SUPPORT (OUTPATIENT)
Dept: EMERGENCY MEDICINE | Facility: HOSPITAL | Age: 54
End: 2024-10-21
Payer: COMMERCIAL

## 2024-10-21 ENCOUNTER — APPOINTMENT (OUTPATIENT)
Dept: RADIOLOGY | Facility: HOSPITAL | Age: 54
End: 2024-10-21
Payer: COMMERCIAL

## 2024-10-21 DIAGNOSIS — Z94.9 TRANSPLANT: ICD-10-CM

## 2024-10-21 DIAGNOSIS — I25.10 CORONARY ARTERY DISEASE INVOLVING NATIVE CORONARY ARTERY OF NATIVE HEART WITHOUT ANGINA PECTORIS: ICD-10-CM

## 2024-10-21 DIAGNOSIS — I16.1 HYPERTENSIVE EMERGENCY: ICD-10-CM

## 2024-10-21 DIAGNOSIS — E11.42 POLYNEUROPATHY DUE TO TYPE 2 DIABETES MELLITUS (MULTI): ICD-10-CM

## 2024-10-21 DIAGNOSIS — I16.0 HYPERTENSIVE URGENCY: ICD-10-CM

## 2024-10-21 DIAGNOSIS — R00.2 PALPITATIONS: ICD-10-CM

## 2024-10-21 DIAGNOSIS — I50.32 CHRONIC HEART FAILURE WITH PRESERVED EJECTION FRACTION (HFPEF): ICD-10-CM

## 2024-10-21 DIAGNOSIS — F41.9 ANXIETY: ICD-10-CM

## 2024-10-21 DIAGNOSIS — N18.6 ESRD (END STAGE RENAL DISEASE) (MULTI): ICD-10-CM

## 2024-10-21 DIAGNOSIS — L02.419 ABSCESS OF AXILLARY REGION: ICD-10-CM

## 2024-10-21 DIAGNOSIS — I10 POORLY-CONTROLLED HYPERTENSION: ICD-10-CM

## 2024-10-21 DIAGNOSIS — J45.40 MODERATE PERSISTENT ASTHMA, UNSPECIFIED WHETHER COMPLICATED (HHS-HCC): ICD-10-CM

## 2024-10-21 DIAGNOSIS — F17.200 NICOTINE USE DISORDER: ICD-10-CM

## 2024-10-21 DIAGNOSIS — Z99.2 DIALYSIS PATIENT (CMS-HCC): ICD-10-CM

## 2024-10-21 DIAGNOSIS — F41.8 ANXIETY ABOUT HEALTH: ICD-10-CM

## 2024-10-21 DIAGNOSIS — I50.1 ACUTE PULMONARY EDEMA WITH HEART DISEASE: ICD-10-CM

## 2024-10-21 DIAGNOSIS — I31.39 OTHER PERICARDIAL EFFUSION (NONINFLAMMATORY) (HHS-HCC): ICD-10-CM

## 2024-10-21 DIAGNOSIS — L73.2 HIDRADENITIS: ICD-10-CM

## 2024-10-21 DIAGNOSIS — E44.0 MALNUTRITION OF MODERATE DEGREE (MULTI): ICD-10-CM

## 2024-10-21 DIAGNOSIS — D64.9 NORMOCYTIC NORMOCHROMIC ANEMIA: ICD-10-CM

## 2024-10-21 DIAGNOSIS — R87.612 LOW GRADE SQUAMOUS INTRAEPITHELIAL LESION ON CYTOLOGIC SMEAR OF CERVIX (LGSIL): ICD-10-CM

## 2024-10-21 DIAGNOSIS — K59.00 CONSTIPATION, UNSPECIFIED CONSTIPATION TYPE: ICD-10-CM

## 2024-10-21 DIAGNOSIS — L74.9 SWEATING ABNORMALITY: ICD-10-CM

## 2024-10-21 DIAGNOSIS — E78.5 HYPERLIPIDEMIA, UNSPECIFIED HYPERLIPIDEMIA TYPE: ICD-10-CM

## 2024-10-21 DIAGNOSIS — I82.622 ACUTE DEEP VEIN THROMBOSIS (DVT) OF LEFT UPPER EXTREMITY (MULTI): ICD-10-CM

## 2024-10-21 DIAGNOSIS — G62.9 NEUROPATHY: ICD-10-CM

## 2024-10-21 DIAGNOSIS — Z99.2 ESRD (END STAGE RENAL DISEASE) ON DIALYSIS (MULTI): ICD-10-CM

## 2024-10-21 DIAGNOSIS — J81.0 FLASH PULMONARY EDEMA: Primary | ICD-10-CM

## 2024-10-21 DIAGNOSIS — R06.09 DYSPNEA ON EXERTION: ICD-10-CM

## 2024-10-21 DIAGNOSIS — I50.9 ACUTE ON CHRONIC CONGESTIVE HEART FAILURE, UNSPECIFIED HEART FAILURE TYPE: ICD-10-CM

## 2024-10-21 DIAGNOSIS — N28.9 NEPHROPATHY: ICD-10-CM

## 2024-10-21 DIAGNOSIS — Z76.82 KIDNEY TRANSPLANT CANDIDATE: ICD-10-CM

## 2024-10-21 DIAGNOSIS — E78.5 COMPLEX DYSLIPIDEMIA: ICD-10-CM

## 2024-10-21 DIAGNOSIS — K52.9 INFLAMMATION OF STOMACH AND INTESTINE: ICD-10-CM

## 2024-10-21 DIAGNOSIS — E66.811 OBESITY, CLASS I, BMI 30-34.9: ICD-10-CM

## 2024-10-21 DIAGNOSIS — J96.01 ACUTE RESPIRATORY FAILURE WITH HYPOXIA (MULTI): ICD-10-CM

## 2024-10-21 DIAGNOSIS — I50.31 ACUTE DIASTOLIC CHF (CONGESTIVE HEART FAILURE): ICD-10-CM

## 2024-10-21 DIAGNOSIS — I10 ESSENTIAL HYPERTENSION: ICD-10-CM

## 2024-10-21 DIAGNOSIS — R87.612 LOW GRADE SQUAMOUS INTRAEPITH LESION ON CYTOLOGIC SMEAR CERVIX (LGSIL): ICD-10-CM

## 2024-10-21 DIAGNOSIS — N18.6 ESRD (END STAGE RENAL DISEASE) ON DIALYSIS (MULTI): ICD-10-CM

## 2024-10-21 DIAGNOSIS — R05.8 PRODUCTIVE COUGH: ICD-10-CM

## 2024-10-21 DIAGNOSIS — H25.13 NUCLEAR SENILE CATARACT OF BOTH EYES: ICD-10-CM

## 2024-10-21 DIAGNOSIS — R06.02 SHORTNESS OF BREATH: ICD-10-CM

## 2024-10-21 LAB
ALBUMIN SERPL BCP-MCNC: 4 G/DL (ref 3.4–5)
ALP SERPL-CCNC: 120 U/L (ref 33–110)
ALT SERPL W P-5'-P-CCNC: 13 U/L (ref 7–45)
ANION GAP BLDV CALCULATED.4IONS-SCNC: 14 MMOL/L (ref 10–25)
ANION GAP BLDV CALCULATED.4IONS-SCNC: 16 MMOL/L (ref 10–25)
ANION GAP SERPL CALC-SCNC: 23 MMOL/L (ref 10–20)
APTT PPP: 45 SECONDS (ref 27–38)
AST SERPL W P-5'-P-CCNC: 16 U/L (ref 9–39)
ATRIAL RATE: 72 BPM
BASE EXCESS BLDV CALC-SCNC: 0.3 MMOL/L (ref -2–3)
BASE EXCESS BLDV CALC-SCNC: 0.3 MMOL/L (ref -2–3)
BASOPHILS # BLD AUTO: 0.08 X10*3/UL (ref 0–0.1)
BASOPHILS NFR BLD AUTO: 0.9 %
BILIRUB SERPL-MCNC: 0.6 MG/DL (ref 0–1.2)
BNP SERPL-MCNC: 2440 PG/ML (ref 0–99)
BODY TEMPERATURE: 37 DEGREES CELSIUS
BODY TEMPERATURE: 37 DEGREES CELSIUS
BUN SERPL-MCNC: 65 MG/DL (ref 6–23)
CA-I BLDV-SCNC: 1.13 MMOL/L (ref 1.1–1.33)
CA-I BLDV-SCNC: 1.17 MMOL/L (ref 1.1–1.33)
CALCIUM SERPL-MCNC: 9.5 MG/DL (ref 8.6–10.6)
CARDIAC TROPONIN I PNL SERPL HS: 38 NG/L (ref 0–34)
CARDIAC TROPONIN I PNL SERPL HS: 41 NG/L (ref 0–34)
CHLORIDE BLDV-SCNC: 100 MMOL/L (ref 98–107)
CHLORIDE BLDV-SCNC: 99 MMOL/L (ref 98–107)
CHLORIDE SERPL-SCNC: 100 MMOL/L (ref 98–107)
CO2 SERPL-SCNC: 22 MMOL/L (ref 21–32)
CREAT SERPL-MCNC: 8.48 MG/DL (ref 0.5–1.05)
EGFRCR SERPLBLD CKD-EPI 2021: 5 ML/MIN/1.73M*2
EOSINOPHIL # BLD AUTO: 0.8 X10*3/UL (ref 0–0.7)
EOSINOPHIL NFR BLD AUTO: 8.8 %
ERYTHROCYTE [DISTWIDTH] IN BLOOD BY AUTOMATED COUNT: 18.5 % (ref 11.5–14.5)
GLUCOSE BLDV-MCNC: 123 MG/DL (ref 74–99)
GLUCOSE BLDV-MCNC: 84 MG/DL (ref 74–99)
GLUCOSE SERPL-MCNC: 87 MG/DL (ref 74–99)
HCO3 BLDV-SCNC: 26 MMOL/L (ref 22–26)
HCO3 BLDV-SCNC: 26 MMOL/L (ref 22–26)
HCT VFR BLD AUTO: 33.3 % (ref 36–46)
HCT VFR BLD EST: 29 % (ref 36–46)
HCT VFR BLD EST: 31 % (ref 36–46)
HGB BLD-MCNC: 9.7 G/DL (ref 12–16)
HGB BLDV-MCNC: 10.4 G/DL (ref 12–16)
HGB BLDV-MCNC: 9.5 G/DL (ref 12–16)
IMM GRANULOCYTES # BLD AUTO: 0.03 X10*3/UL (ref 0–0.7)
IMM GRANULOCYTES NFR BLD AUTO: 0.3 % (ref 0–0.9)
INHALED O2 CONCENTRATION: 21 %
INR PPP: 1.7 (ref 0.9–1.1)
LACTATE BLDV-SCNC: 0.6 MMOL/L (ref 0.4–2)
LACTATE BLDV-SCNC: 0.9 MMOL/L (ref 0.4–2)
LYMPHOCYTES # BLD AUTO: 0.71 X10*3/UL (ref 1.2–4.8)
LYMPHOCYTES NFR BLD AUTO: 7.8 %
MAGNESIUM SERPL-MCNC: 2.33 MG/DL (ref 1.6–2.4)
MCH RBC QN AUTO: 28.7 PG (ref 26–34)
MCHC RBC AUTO-ENTMCNC: 29.1 G/DL (ref 32–36)
MCV RBC AUTO: 99 FL (ref 80–100)
MONOCYTES # BLD AUTO: 0.39 X10*3/UL (ref 0.1–1)
MONOCYTES NFR BLD AUTO: 4.3 %
NEUTROPHILS # BLD AUTO: 7.07 X10*3/UL (ref 1.2–7.7)
NEUTROPHILS NFR BLD AUTO: 77.9 %
NRBC BLD-RTO: 0 /100 WBCS (ref 0–0)
OXYHGB MFR BLDV: 58.2 % (ref 45–75)
OXYHGB MFR BLDV: 68.8 % (ref 45–75)
P AXIS: 56 DEGREES
P OFFSET: 198 MS
P ONSET: 150 MS
PCO2 BLDV: 46 MM HG (ref 41–51)
PCO2 BLDV: 46 MM HG (ref 41–51)
PH BLDV: 7.36 PH (ref 7.33–7.43)
PH BLDV: 7.36 PH (ref 7.33–7.43)
PHOSPHATE SERPL-MCNC: 8.2 MG/DL (ref 2.5–4.9)
PLATELET # BLD AUTO: 194 X10*3/UL (ref 150–450)
PO2 BLDV: 39 MM HG (ref 35–45)
PO2 BLDV: 45 MM HG (ref 35–45)
POTASSIUM BLDV-SCNC: 6.3 MMOL/L (ref 3.5–5.3)
POTASSIUM BLDV-SCNC: 6.5 MMOL/L (ref 3.5–5.3)
POTASSIUM SERPL-SCNC: 4.9 MMOL/L (ref 3.5–5.3)
PR INTERVAL: 140 MS
PROT SERPL-MCNC: 7.1 G/DL (ref 6.4–8.2)
PROTHROMBIN TIME: 18.8 SECONDS (ref 9.8–12.8)
Q ONSET: 220 MS
QRS COUNT: 12 BEATS
QRS DURATION: 78 MS
QT INTERVAL: 400 MS
QTC CALCULATION(BAZETT): 438 MS
QTC FREDERICIA: 425 MS
R AXIS: 36 DEGREES
RBC # BLD AUTO: 3.38 X10*6/UL (ref 4–5.2)
SAO2 % BLDV: 59 % (ref 45–75)
SAO2 % BLDV: 70 % (ref 45–75)
SODIUM BLDV-SCNC: 133 MMOL/L (ref 136–145)
SODIUM BLDV-SCNC: 135 MMOL/L (ref 136–145)
SODIUM SERPL-SCNC: 140 MMOL/L (ref 136–145)
T AXIS: 165 DEGREES
T OFFSET: 420 MS
VENTRICULAR RATE: 72 BPM
WBC # BLD AUTO: 9.1 X10*3/UL (ref 4.4–11.3)

## 2024-10-21 PROCEDURE — 93010 ELECTROCARDIOGRAM REPORT: CPT | Performed by: STUDENT IN AN ORGANIZED HEALTH CARE EDUCATION/TRAINING PROGRAM

## 2024-10-21 PROCEDURE — 2500000001 HC RX 250 WO HCPCS SELF ADMINISTERED DRUGS (ALT 637 FOR MEDICARE OP): Performed by: STUDENT IN AN ORGANIZED HEALTH CARE EDUCATION/TRAINING PROGRAM

## 2024-10-21 PROCEDURE — 85610 PROTHROMBIN TIME: CPT | Performed by: STUDENT IN AN ORGANIZED HEALTH CARE EDUCATION/TRAINING PROGRAM

## 2024-10-21 PROCEDURE — 82435 ASSAY OF BLOOD CHLORIDE: CPT | Performed by: STUDENT IN AN ORGANIZED HEALTH CARE EDUCATION/TRAINING PROGRAM

## 2024-10-21 PROCEDURE — 1100000001 HC PRIVATE ROOM DAILY

## 2024-10-21 PROCEDURE — 93005 ELECTROCARDIOGRAM TRACING: CPT

## 2024-10-21 PROCEDURE — 71045 X-RAY EXAM CHEST 1 VIEW: CPT | Performed by: RADIOLOGY

## 2024-10-21 PROCEDURE — 83735 ASSAY OF MAGNESIUM: CPT | Performed by: STUDENT IN AN ORGANIZED HEALTH CARE EDUCATION/TRAINING PROGRAM

## 2024-10-21 PROCEDURE — 94660 CPAP INITIATION&MGMT: CPT

## 2024-10-21 PROCEDURE — 84484 ASSAY OF TROPONIN QUANT: CPT | Performed by: STUDENT IN AN ORGANIZED HEALTH CARE EDUCATION/TRAINING PROGRAM

## 2024-10-21 PROCEDURE — 85025 COMPLETE CBC W/AUTO DIFF WBC: CPT | Performed by: STUDENT IN AN ORGANIZED HEALTH CARE EDUCATION/TRAINING PROGRAM

## 2024-10-21 PROCEDURE — 36415 COLL VENOUS BLD VENIPUNCTURE: CPT | Performed by: STUDENT IN AN ORGANIZED HEALTH CARE EDUCATION/TRAINING PROGRAM

## 2024-10-21 PROCEDURE — 96375 TX/PRO/DX INJ NEW DRUG ADDON: CPT

## 2024-10-21 PROCEDURE — 5A09357 ASSISTANCE WITH RESPIRATORY VENTILATION, LESS THAN 24 CONSECUTIVE HOURS, CONTINUOUS POSITIVE AIRWAY PRESSURE: ICD-10-PCS | Performed by: STUDENT IN AN ORGANIZED HEALTH CARE EDUCATION/TRAINING PROGRAM

## 2024-10-21 PROCEDURE — 83880 ASSAY OF NATRIURETIC PEPTIDE: CPT | Performed by: STUDENT IN AN ORGANIZED HEALTH CARE EDUCATION/TRAINING PROGRAM

## 2024-10-21 PROCEDURE — 96365 THER/PROPH/DIAG IV INF INIT: CPT

## 2024-10-21 PROCEDURE — 82947 ASSAY GLUCOSE BLOOD QUANT: CPT | Performed by: STUDENT IN AN ORGANIZED HEALTH CARE EDUCATION/TRAINING PROGRAM

## 2024-10-21 PROCEDURE — 84100 ASSAY OF PHOSPHORUS: CPT

## 2024-10-21 PROCEDURE — 99285 EMERGENCY DEPT VISIT HI MDM: CPT | Mod: 25

## 2024-10-21 PROCEDURE — 2500000001 HC RX 250 WO HCPCS SELF ADMINISTERED DRUGS (ALT 637 FOR MEDICARE OP)

## 2024-10-21 PROCEDURE — 99285 EMERGENCY DEPT VISIT HI MDM: CPT | Performed by: STUDENT IN AN ORGANIZED HEALTH CARE EDUCATION/TRAINING PROGRAM

## 2024-10-21 PROCEDURE — 85730 THROMBOPLASTIN TIME PARTIAL: CPT | Performed by: STUDENT IN AN ORGANIZED HEALTH CARE EDUCATION/TRAINING PROGRAM

## 2024-10-21 PROCEDURE — 70450 CT HEAD/BRAIN W/O DYE: CPT

## 2024-10-21 PROCEDURE — 70450 CT HEAD/BRAIN W/O DYE: CPT | Performed by: STUDENT IN AN ORGANIZED HEALTH CARE EDUCATION/TRAINING PROGRAM

## 2024-10-21 PROCEDURE — 71045 X-RAY EXAM CHEST 1 VIEW: CPT

## 2024-10-21 PROCEDURE — 2500000004 HC RX 250 GENERAL PHARMACY W/ HCPCS (ALT 636 FOR OP/ED): Mod: JZ,JG

## 2024-10-21 PROCEDURE — 2500000002 HC RX 250 W HCPCS SELF ADMINISTERED DRUGS (ALT 637 FOR MEDICARE OP, ALT 636 FOR OP/ED)

## 2024-10-21 PROCEDURE — 83605 ASSAY OF LACTIC ACID: CPT

## 2024-10-21 PROCEDURE — 96368 THER/DIAG CONCURRENT INF: CPT

## 2024-10-21 PROCEDURE — 2500000004 HC RX 250 GENERAL PHARMACY W/ HCPCS (ALT 636 FOR OP/ED): Performed by: STUDENT IN AN ORGANIZED HEALTH CARE EDUCATION/TRAINING PROGRAM

## 2024-10-21 PROCEDURE — 99223 1ST HOSP IP/OBS HIGH 75: CPT

## 2024-10-21 PROCEDURE — 2500000004 HC RX 250 GENERAL PHARMACY W/ HCPCS (ALT 636 FOR OP/ED): Mod: JG | Performed by: STUDENT IN AN ORGANIZED HEALTH CARE EDUCATION/TRAINING PROGRAM

## 2024-10-21 RX ORDER — NITROGLYCERIN 0.4 MG/1
0.4 TABLET SUBLINGUAL ONCE
Status: COMPLETED | OUTPATIENT
Start: 2024-10-21 | End: 2024-10-21

## 2024-10-21 RX ORDER — ALBUTEROL SULFATE 90 UG/1
2 INHALANT RESPIRATORY (INHALATION) EVERY 4 HOURS PRN
Status: DISCONTINUED | OUTPATIENT
Start: 2024-10-21 | End: 2024-10-24 | Stop reason: HOSPADM

## 2024-10-21 RX ORDER — CARVEDILOL 25 MG/1
50 TABLET ORAL 2 TIMES DAILY
Status: DISCONTINUED | OUTPATIENT
Start: 2024-10-21 | End: 2024-10-24 | Stop reason: HOSPADM

## 2024-10-21 RX ORDER — PANTOPRAZOLE SODIUM 40 MG/1
40 TABLET, DELAYED RELEASE ORAL
Status: DISCONTINUED | OUTPATIENT
Start: 2024-10-22 | End: 2024-10-24 | Stop reason: HOSPADM

## 2024-10-21 RX ORDER — ASPIRIN 81 MG/1
81 TABLET ORAL DAILY
Status: DISCONTINUED | OUTPATIENT
Start: 2024-10-21 | End: 2024-10-24 | Stop reason: HOSPADM

## 2024-10-21 RX ORDER — VALSARTAN 160 MG/1
320 TABLET ORAL
Status: DISCONTINUED | OUTPATIENT
Start: 2024-10-22 | End: 2024-10-21

## 2024-10-21 RX ORDER — SUMATRIPTAN SUCCINATE 25 MG/1
25 TABLET ORAL ONCE AS NEEDED
Status: DISCONTINUED | OUTPATIENT
Start: 2024-10-21 | End: 2024-10-24 | Stop reason: HOSPADM

## 2024-10-21 RX ORDER — HYDRALAZINE HYDROCHLORIDE 50 MG/1
100 TABLET, FILM COATED ORAL 3 TIMES DAILY
Status: DISCONTINUED | OUTPATIENT
Start: 2024-10-21 | End: 2024-10-21

## 2024-10-21 RX ORDER — VALSARTAN 160 MG/1
160 TABLET ORAL EVERY 12 HOURS SCHEDULED
Status: DISCONTINUED | OUTPATIENT
Start: 2024-10-22 | End: 2024-10-21

## 2024-10-21 RX ORDER — HYDRALAZINE HYDROCHLORIDE 50 MG/1
100 TABLET, FILM COATED ORAL 3 TIMES DAILY
Status: DISCONTINUED | OUTPATIENT
Start: 2024-10-21 | End: 2024-10-24 | Stop reason: HOSPADM

## 2024-10-21 RX ORDER — CALCIUM GLUCONATE 20 MG/ML
INJECTION, SOLUTION INTRAVENOUS
Status: COMPLETED
Start: 2024-10-21 | End: 2024-10-21

## 2024-10-21 RX ORDER — FUROSEMIDE 10 MG/ML
60 INJECTION INTRAMUSCULAR; INTRAVENOUS ONCE
Status: COMPLETED | OUTPATIENT
Start: 2024-10-21 | End: 2024-10-21

## 2024-10-21 RX ORDER — FLUTICASONE FUROATE AND VILANTEROL 100; 25 UG/1; UG/1
1 POWDER RESPIRATORY (INHALATION)
Status: DISCONTINUED | OUTPATIENT
Start: 2024-10-21 | End: 2024-10-24 | Stop reason: HOSPADM

## 2024-10-21 RX ORDER — VALSARTAN 320 MG/1
320 TABLET ORAL
Status: COMPLETED | OUTPATIENT
Start: 2024-10-22 | End: 2024-10-22

## 2024-10-21 RX ORDER — NIFEDIPINE 30 MG/1
90 TABLET, FILM COATED, EXTENDED RELEASE ORAL
Status: DISCONTINUED | OUTPATIENT
Start: 2024-10-21 | End: 2024-10-21

## 2024-10-21 RX ORDER — ISOSORBIDE MONONITRATE 30 MG/1
120 TABLET, EXTENDED RELEASE ORAL DAILY
Status: DISCONTINUED | OUTPATIENT
Start: 2024-10-21 | End: 2024-10-24 | Stop reason: HOSPADM

## 2024-10-21 RX ORDER — CALCIUM ACETATE 667 MG/1
1334 CAPSULE ORAL
Status: DISCONTINUED | OUTPATIENT
Start: 2024-10-21 | End: 2024-10-24 | Stop reason: HOSPADM

## 2024-10-21 RX ORDER — CLONIDINE HYDROCHLORIDE 0.1 MG/1
0.2 TABLET ORAL 3 TIMES DAILY
Status: DISCONTINUED | OUTPATIENT
Start: 2024-10-21 | End: 2024-10-23

## 2024-10-21 RX ORDER — ACETAMINOPHEN 500 MG
5 TABLET ORAL NIGHTLY
Status: DISCONTINUED | OUTPATIENT
Start: 2024-10-21 | End: 2024-10-24 | Stop reason: HOSPADM

## 2024-10-21 RX ORDER — NITROGLYCERIN 20 MG/100ML
5-200 INJECTION INTRAVENOUS CONTINUOUS
Status: DISCONTINUED | OUTPATIENT
Start: 2024-10-21 | End: 2024-10-21

## 2024-10-21 RX ORDER — HYDROXYZINE HYDROCHLORIDE 25 MG/1
25 TABLET, FILM COATED ORAL EVERY 6 HOURS PRN
Status: DISCONTINUED | OUTPATIENT
Start: 2024-10-21 | End: 2024-10-24 | Stop reason: HOSPADM

## 2024-10-21 RX ORDER — CLONIDINE HYDROCHLORIDE 0.1 MG/1
0.2 TABLET ORAL 3 TIMES DAILY
Status: DISCONTINUED | OUTPATIENT
Start: 2024-10-21 | End: 2024-10-21

## 2024-10-21 RX ORDER — GABAPENTIN 300 MG/1
300 CAPSULE ORAL NIGHTLY
Status: DISCONTINUED | OUTPATIENT
Start: 2024-10-21 | End: 2024-10-24 | Stop reason: HOSPADM

## 2024-10-21 RX ORDER — ACETAMINOPHEN 325 MG/1
TABLET ORAL
Status: COMPLETED
Start: 2024-10-21 | End: 2024-10-21

## 2024-10-21 RX ORDER — CARVEDILOL 25 MG/1
25 TABLET ORAL 2 TIMES DAILY
Status: DISCONTINUED | OUTPATIENT
Start: 2024-10-21 | End: 2024-10-21

## 2024-10-21 RX ORDER — CALCIUM GLUCONATE 20 MG/ML
2 INJECTION, SOLUTION INTRAVENOUS ONCE
Status: COMPLETED | OUTPATIENT
Start: 2024-10-21 | End: 2024-10-21

## 2024-10-21 RX ORDER — ALBUTEROL SULFATE 0.83 MG/ML
1.25 SOLUTION RESPIRATORY (INHALATION) EVERY 6 HOURS PRN
Status: DISCONTINUED | OUTPATIENT
Start: 2024-10-21 | End: 2024-10-24 | Stop reason: HOSPADM

## 2024-10-21 RX ORDER — ATORVASTATIN CALCIUM 80 MG/1
80 TABLET, FILM COATED ORAL NIGHTLY
Status: DISCONTINUED | OUTPATIENT
Start: 2024-10-21 | End: 2024-10-24 | Stop reason: HOSPADM

## 2024-10-21 RX ORDER — VALSARTAN 320 MG/1
320 TABLET ORAL EVERY EVENING
Status: DISCONTINUED | OUTPATIENT
Start: 2024-10-23 | End: 2024-10-24 | Stop reason: HOSPADM

## 2024-10-21 RX ORDER — ACETAMINOPHEN 325 MG/1
975 TABLET ORAL ONCE
Status: COMPLETED | OUTPATIENT
Start: 2024-10-21 | End: 2024-10-21

## 2024-10-21 RX ORDER — VALSARTAN 160 MG/1
320 TABLET ORAL DAILY
Status: DISCONTINUED | OUTPATIENT
Start: 2024-10-21 | End: 2024-10-21

## 2024-10-21 RX ORDER — NITROGLYCERIN 20 MG/100ML
INJECTION INTRAVENOUS
Status: COMPLETED
Start: 2024-10-21 | End: 2024-10-21

## 2024-10-21 RX ORDER — NIFEDIPINE 30 MG/1
90 TABLET, FILM COATED, EXTENDED RELEASE ORAL
Status: DISCONTINUED | OUTPATIENT
Start: 2024-10-22 | End: 2024-10-24 | Stop reason: HOSPADM

## 2024-10-21 RX ORDER — POLYETHYLENE GLYCOL 3350 17 G/17G
17 POWDER, FOR SOLUTION ORAL DAILY
Status: DISCONTINUED | OUTPATIENT
Start: 2024-10-21 | End: 2024-10-24 | Stop reason: HOSPADM

## 2024-10-21 RX ORDER — VALSARTAN 160 MG/1
320 TABLET ORAL DAILY
Status: DISCONTINUED | OUTPATIENT
Start: 2024-10-22 | End: 2024-10-21

## 2024-10-21 RX ORDER — FLUTICASONE PROPIONATE 50 MCG
2 SPRAY, SUSPENSION (ML) NASAL DAILY
Status: DISCONTINUED | OUTPATIENT
Start: 2024-10-21 | End: 2024-10-24 | Stop reason: HOSPADM

## 2024-10-21 RX ORDER — TORSEMIDE 20 MG/1
40 TABLET ORAL
Status: DISCONTINUED | OUTPATIENT
Start: 2024-10-23 | End: 2024-10-24 | Stop reason: HOSPADM

## 2024-10-21 RX ORDER — NITROGLYCERIN 0.4 MG/1
TABLET SUBLINGUAL
Status: COMPLETED
Start: 2024-10-21 | End: 2024-10-21

## 2024-10-21 SDOH — SOCIAL STABILITY: SOCIAL INSECURITY: WITHIN THE LAST YEAR, HAVE YOU BEEN AFRAID OF YOUR PARTNER OR EX-PARTNER?: NO

## 2024-10-21 SDOH — SOCIAL STABILITY: SOCIAL INSECURITY: HAVE YOU HAD ANY THOUGHTS OF HARMING ANYONE ELSE?: NO

## 2024-10-21 SDOH — SOCIAL STABILITY: SOCIAL INSECURITY: ABUSE: ADULT

## 2024-10-21 SDOH — ECONOMIC STABILITY: INCOME INSECURITY: IN THE PAST 12 MONTHS HAS THE ELECTRIC, GAS, OIL, OR WATER COMPANY THREATENED TO SHUT OFF SERVICES IN YOUR HOME?: NO

## 2024-10-21 SDOH — SOCIAL STABILITY: SOCIAL INSECURITY: DO YOU FEEL UNSAFE GOING BACK TO THE PLACE WHERE YOU ARE LIVING?: NO

## 2024-10-21 SDOH — SOCIAL STABILITY: SOCIAL INSECURITY: ARE YOU MARRIED, WIDOWED, DIVORCED, SEPARATED, NEVER MARRIED, OR LIVING WITH A PARTNER?: LIVING WITH PARTNER

## 2024-10-21 SDOH — ECONOMIC STABILITY: FOOD INSECURITY: WITHIN THE PAST 12 MONTHS, THE FOOD YOU BOUGHT JUST DIDN'T LAST AND YOU DIDN'T HAVE MONEY TO GET MORE.: NEVER TRUE

## 2024-10-21 SDOH — ECONOMIC STABILITY: HOUSING INSECURITY: IN THE LAST 12 MONTHS, WAS THERE A TIME WHEN YOU WERE NOT ABLE TO PAY THE MORTGAGE OR RENT ON TIME?: NO

## 2024-10-21 SDOH — HEALTH STABILITY: MENTAL HEALTH: HOW MANY DRINKS CONTAINING ALCOHOL DO YOU HAVE ON A TYPICAL DAY WHEN YOU ARE DRINKING?: PATIENT DOES NOT DRINK

## 2024-10-21 SDOH — ECONOMIC STABILITY: TRANSPORTATION INSECURITY: IN THE PAST 12 MONTHS, HAS LACK OF TRANSPORTATION KEPT YOU FROM MEDICAL APPOINTMENTS OR FROM GETTING MEDICATIONS?: NO

## 2024-10-21 SDOH — ECONOMIC STABILITY: FOOD INSECURITY: HOW HARD IS IT FOR YOU TO PAY FOR THE VERY BASICS LIKE FOOD, HOUSING, MEDICAL CARE, AND HEATING?: NOT HARD AT ALL

## 2024-10-21 SDOH — SOCIAL STABILITY: SOCIAL NETWORK: HOW OFTEN DO YOU ATTEND CHURCH OR RELIGIOUS SERVICES?: MORE THAN 4 TIMES PER YEAR

## 2024-10-21 SDOH — SOCIAL STABILITY: SOCIAL NETWORK
DO YOU BELONG TO ANY CLUBS OR ORGANIZATIONS SUCH AS CHURCH GROUPS, UNIONS, FRATERNAL OR ATHLETIC GROUPS, OR SCHOOL GROUPS?: NO

## 2024-10-21 SDOH — SOCIAL STABILITY: SOCIAL INSECURITY: WITHIN THE LAST YEAR, HAVE YOU BEEN HUMILIATED OR EMOTIONALLY ABUSED IN OTHER WAYS BY YOUR PARTNER OR EX-PARTNER?: NO

## 2024-10-21 SDOH — SOCIAL STABILITY: SOCIAL INSECURITY: HAVE YOU HAD THOUGHTS OF HARMING ANYONE ELSE?: NO

## 2024-10-21 SDOH — SOCIAL STABILITY: SOCIAL NETWORK: HOW OFTEN DO YOU GET TOGETHER WITH FRIENDS OR RELATIVES?: ONCE A WEEK

## 2024-10-21 SDOH — HEALTH STABILITY: MENTAL HEALTH: HOW OFTEN DO YOU HAVE A DRINK CONTAINING ALCOHOL?: NEVER

## 2024-10-21 SDOH — HEALTH STABILITY: MENTAL HEALTH: HOW OFTEN DO YOU HAVE SIX OR MORE DRINKS ON ONE OCCASION?: NEVER

## 2024-10-21 SDOH — ECONOMIC STABILITY: HOUSING INSECURITY: AT ANY TIME IN THE PAST 12 MONTHS, WERE YOU HOMELESS OR LIVING IN A SHELTER (INCLUDING NOW)?: NO

## 2024-10-21 SDOH — ECONOMIC STABILITY: FOOD INSECURITY: WITHIN THE PAST 12 MONTHS, YOU WORRIED THAT YOUR FOOD WOULD RUN OUT BEFORE YOU GOT THE MONEY TO BUY MORE.: NEVER TRUE

## 2024-10-21 SDOH — SOCIAL STABILITY: SOCIAL INSECURITY: ARE THERE ANY APPARENT SIGNS OF INJURIES/BEHAVIORS THAT COULD BE RELATED TO ABUSE/NEGLECT?: NO

## 2024-10-21 SDOH — SOCIAL STABILITY: SOCIAL INSECURITY: DOES ANYONE TRY TO KEEP YOU FROM HAVING/CONTACTING OTHER FRIENDS OR DOING THINGS OUTSIDE YOUR HOME?: NO

## 2024-10-21 SDOH — HEALTH STABILITY: MENTAL HEALTH
DO YOU FEEL STRESS - TENSE, RESTLESS, NERVOUS, OR ANXIOUS, OR UNABLE TO SLEEP AT NIGHT BECAUSE YOUR MIND IS TROUBLED ALL THE TIME - THESE DAYS?: ONLY A LITTLE

## 2024-10-21 SDOH — SOCIAL STABILITY: SOCIAL INSECURITY: ARE YOU OR HAVE YOU BEEN THREATENED OR ABUSED PHYSICALLY, EMOTIONALLY, OR SEXUALLY BY ANYONE?: NO

## 2024-10-21 SDOH — SOCIAL STABILITY: SOCIAL INSECURITY: WERE YOU ABLE TO COMPLETE ALL THE BEHAVIORAL HEALTH SCREENINGS?: YES

## 2024-10-21 SDOH — SOCIAL STABILITY: SOCIAL INSECURITY: DO YOU FEEL ANYONE HAS EXPLOITED OR TAKEN ADVANTAGE OF YOU FINANCIALLY OR OF YOUR PERSONAL PROPERTY?: NO

## 2024-10-21 SDOH — ECONOMIC STABILITY: HOUSING INSECURITY: IN THE PAST 12 MONTHS, HOW MANY TIMES HAVE YOU MOVED WHERE YOU WERE LIVING?: 0

## 2024-10-21 SDOH — SOCIAL STABILITY: SOCIAL INSECURITY: HAS ANYONE EVER THREATENED TO HURT YOUR FAMILY OR YOUR PETS?: NO

## 2024-10-21 ASSESSMENT — ACTIVITIES OF DAILY LIVING (ADL)
GROOMING: INDEPENDENT
LACK_OF_TRANSPORTATION: NO
TOILETING: INDEPENDENT
FEEDING YOURSELF: INDEPENDENT
JUDGMENT_ADEQUATE_SAFELY_COMPLETE_DAILY_ACTIVITIES: YES
PATIENT'S MEMORY ADEQUATE TO SAFELY COMPLETE DAILY ACTIVITIES?: YES
WALKS IN HOME: INDEPENDENT
ADEQUATE_TO_COMPLETE_ADL: YES
HEARING - RIGHT EAR: FUNCTIONAL
DRESSING YOURSELF: INDEPENDENT
HEARING - LEFT EAR: FUNCTIONAL
BATHING: INDEPENDENT
LACK_OF_TRANSPORTATION: NO

## 2024-10-21 ASSESSMENT — LIFESTYLE VARIABLES
AUDIT-C TOTAL SCORE: 0
EVER FELT BAD OR GUILTY ABOUT YOUR DRINKING: NO
HAVE PEOPLE ANNOYED YOU BY CRITICIZING YOUR DRINKING: NO
HOW MANY STANDARD DRINKS CONTAINING ALCOHOL DO YOU HAVE ON A TYPICAL DAY: PATIENT DOES NOT DRINK
SKIP TO QUESTIONS 9-10: 1
AUDIT-C TOTAL SCORE: 0
HAVE YOU EVER FELT YOU SHOULD CUT DOWN ON YOUR DRINKING: NO
EVER HAD A DRINK FIRST THING IN THE MORNING TO STEADY YOUR NERVES TO GET RID OF A HANGOVER: NO
TOTAL SCORE: 0
HOW OFTEN DO YOU HAVE A DRINK CONTAINING ALCOHOL: NEVER
SKIP TO QUESTIONS 9-10: 1
AUDIT-C TOTAL SCORE: 0
HOW OFTEN DO YOU HAVE 6 OR MORE DRINKS ON ONE OCCASION: NEVER

## 2024-10-21 ASSESSMENT — COGNITIVE AND FUNCTIONAL STATUS - GENERAL
PATIENT BASELINE BEDBOUND: NO
DAILY ACTIVITIY SCORE: 24
MOBILITY SCORE: 24
DAILY ACTIVITIY SCORE: 24
MOBILITY SCORE: 24

## 2024-10-21 ASSESSMENT — PAIN DESCRIPTION - PAIN TYPE: TYPE: ACUTE PAIN

## 2024-10-21 ASSESSMENT — COLUMBIA-SUICIDE SEVERITY RATING SCALE - C-SSRS
4. HAVE YOU HAD THESE THOUGHTS AND HAD SOME INTENTION OF ACTING ON THEM?: NO
6. HAVE YOU EVER DONE ANYTHING, STARTED TO DO ANYTHING, OR PREPARED TO DO ANYTHING TO END YOUR LIFE?: NO
5. HAVE YOU STARTED TO WORK OUT OR WORKED OUT THE DETAILS OF HOW TO KILL YOURSELF? DO YOU INTEND TO CARRY OUT THIS PLAN?: NO
2. HAVE YOU ACTUALLY HAD ANY THOUGHTS OF KILLING YOURSELF?: NO
1. IN THE PAST MONTH, HAVE YOU WISHED YOU WERE DEAD OR WISHED YOU COULD GO TO SLEEP AND NOT WAKE UP?: NO

## 2024-10-21 ASSESSMENT — PAIN SCALES - GENERAL
PAINLEVEL_OUTOF10: 3
PAINLEVEL_OUTOF10: 0 - NO PAIN
PAINLEVEL_OUTOF10: 7

## 2024-10-21 ASSESSMENT — PAIN - FUNCTIONAL ASSESSMENT
PAIN_FUNCTIONAL_ASSESSMENT: 0-10
PAIN_FUNCTIONAL_ASSESSMENT: 0-10

## 2024-10-21 ASSESSMENT — PAIN DESCRIPTION - LOCATION: LOCATION: HEAD

## 2024-10-21 NOTE — ED TRIAGE NOTES
Pt having SOB, has ESRD had full session on Sat, in triage pt pursed lip breathing, tripod position and unable to speak in full sentences critical called and pt taken straight back

## 2024-10-21 NOTE — ED PROVIDER NOTES
History of Present Illness     History provided by: Patient  Limitations to History: None  External Records Reviewed with Brief Summary: None    HPI:  Stacey Heath is a 54 y.o. female with PMH HTN, HLD, DM, CAD, asthma/COPD, ESRD on // HD, CHF who is presenting with shortness of breath. Reports it started today about an hour prior to arrival. It woke her up from sleep. Last dialysis session was 2 days ago on Saturday, completed full session without issue. No fevers, cough, chest pain, n/v. On chart review, patient has presented similarly several times with flash pulmonary edema/hypertensive emergency.     Physical Exam   Triage vitals:  T 36.8 °C (98.2 °F)  HR 89  BP (!) 239/96  RR 16  O2 94 % Supplemental oxygen    GEN: Ill-appearing, awake but pursed lip breathing and retractions, in moderate respiratory distress. Tachypneic.    HEENT: Normocephalic and atraumatic. Moist mucous membranes.  NECK: Normal ROM.   CARDIAC: Regular rate and rhythm.  RESP: Moderate resp distress with tripoding, pursed lip breathing and retractions. Crackles diffusely, no wheezing. Moving air bilaterally. Tachypneic.   ABD: Soft, nontender, nondistended.   EXTREMITIES: Normal ROM. No edema or tenderness.   SKIN: Warm, dry.   NEURO: Alert and oriented x4. Moving all extremities symmetrically and spontaneously.   PSYCH: Calm, cooperative.      Medical Decision Making & ED Course   Medical Decision Makin y.o. female HTN, HLD, DM, CAD, asthma/COPD, ESRD on // HD, CHF who is presenting with shortness of breath that started suddenly this AM. Patient presented as a critical due to respiratory distress. On initial evaluation, she is tripoding with retractions and tachypnea with diffuse crackles. Presentation consistent with flash pulmonary edema. Vitals show significant hypertension to SBP 230s and was placed on 2L NC while awaiting BiPAP initiation. Gave sublingual nitroglycerin x2 while access obtained and started on  nitroglycerin gtt. Pt makes urine, IV lasix administered as well. EKG without new changes concerning for ischemia. Questionable peaked T waves and VBG shows K 6.3 so given calcium. Unclear of the validity of the hyperK, will await CMP to pursue temporization. Will also get broad labs and CXR. Pt will require admission and potentially dialysis, even though had full run of her last scheduled one on Saturday. Anticipate admission, ICU versus floor depending on our ability to wean her off BiPAP. Signed out to oncoming team pending labs and re-evaluation.   ----    EKG Independent Interpretation: EKG interpreted by myself. Please see ED Course for full interpretation.    Independent Result Review and Interpretation: Relevant laboratory and radiographic results were reviewed and independently interpreted by myself.  As necessary, they are commented on in the ED Course.    Chronic conditions affecting the patient's care: As documented above in [facility]    The patient was discussed with the following consultants/services: None    Care Considerations: As documented above in [facility]    ED Course:  ED Course as of 10/21/24 0655   Mon Oct 21, 2024   0654 ECG 12 lead  NSR. HR 72. Normal intervals. Normal axis. TWI in leads V6, I, II, aVL. No ST segment changes. Slightly peaked T waves precordially.  [SS]      ED Course User Index  [SS] Betty Jansen MD     Disposition   Admit    Procedures   Procedures      Betty Jansen MD  Emergency Medicine       Betty Jansen MD  10/21/24 0655

## 2024-10-21 NOTE — PROGRESS NOTES
Pharmacy Medication History Review    Stacey Heath is a 54 y.o. female admitted for Flash pulmonary edema. Pharmacy reviewed the patient's [id number] medications and allergies for accuracy.    Medications ADDED:  none  Medications CHANGED:  none  Medications REMOVED:   none    The list below reflects the updated PTA list.   Prior to Admission Medications   Prescriptions Last Dose Informant   NIFEdipine ER (Adalat CC) 90 mg 24 hr tablet 10/20/2024 Self   Sig: Take 1 tablet (90 mg) by mouth once daily in the morning. Take before meals. Do not crush, chew, or split.   SUMAtriptan (Imitrex) 25 mg tablet Past Week Self   Sig: Take 1 tablet (25 mg) by mouth 1 time if needed for migraine. May repeat dose once in 2 hours if no relief.  Do not exceed 2 doses in 24 hours.   albuterol (ProAir HFA) 90 mcg/actuation inhaler 10/20/2024 Self   Sig: Inhale 2 puffs every 4 hours if needed for wheezing or shortness of breath.   albuterol 1.25 mg/3 mL nebulizer solution 10/20/2024 Self   Sig: Take 3 mL (1.25 mg) by nebulization every 6 hours if needed for wheezing or shortness of breath.   apixaban (Eliquis) 5 mg tablet 10/20/2024 Self   Sig: Take 1 tablet (5 mg) by mouth 2 times a day.   aspirin 81 mg EC tablet 10/20/2024 Self   Sig: Take 1 tablet (81 mg) by mouth once daily.   atorvastatin (Lipitor) 80 mg tablet 10/20/2024 Self   Sig: Take 1 tablet (80 mg) by mouth once daily at bedtime.   calcium acetate (Phoslo) 667 mg capsule 10/20/2024 Self   Sig: Take 2 capsules (1,334 mg) by mouth 3 times daily (morning, midday, late afternoon).   carvedilol (Coreg) 25 mg tablet 10/20/2024 Self   Sig: Take 2 tablets (50 mg) by mouth 2 times a day.   cloNIDine (Catapres) 0.2 mg tablet 10/20/2024 Self   Sig: Take 1 tablet (0.2 mg) by mouth 3 times a day.   epoetin joceline (Epogen,Procrit) 10,000 unit/mL injection  Self   Sig: Inject 1 mL (10,000 Units) under the skin 3 times a week.   fluticasone (Flonase) 50 mcg/actuation nasal spray  10/20/2024 Self   Sig: Administer 2 sprays into each nostril once daily. Shake gently. Before first use, prime pump. After use, clean tip and replace cap.   fluticasone furoate-vilanteroL (Breo Ellipta) 100-25 mcg/dose inhaler 10/20/2024 Self   Sig: Inhale 1 puff once daily.   gabapentin (Neurontin) 300 mg capsule 10/20/2024 Self   Sig: Take 1 capsule (300 mg) by mouth once daily at bedtime.   hydrALAZINE (Apresoline) 100 mg tablet 10/20/2024 Self   Sig: Take 1 tablet (100 mg) by mouth 3 times a day.   hydrOXYzine HCL (Atarax) 25 mg tablet 10/20/2024 Self   Sig: Take 1 tablet (25 mg) by mouth every 6 hours if needed for anxiety, allergies or itching.   isosorbide mononitrate ER (Imdur) 120 mg 24 hr tablet 10/20/2024 Self   Sig: Take 1 tablet (120 mg) by mouth once daily. Do not crush or chew.   melatonin 5 mg tablet Past Week Self   Sig: Take 1 tablet (5 mg) by mouth once daily at bedtime.   pantoprazole (ProtoNix) 40 mg EC tablet 10/20/2024 Self   Sig: Take 1 tablet (40 mg) by mouth once daily in the morning. Take before meals. Do not crush, chew, or split.   polyethylene glycol (Glycolax, Miralax) 17 gram/dose powder Past Month Self   Sig: Take 17 g (1 scoop dissolved in liquid) by mouth once daily. Do not start before July 26, 2024.   torsemide (Demadex) 20 mg tablet 10/20/2024 Self   Sig: Take 2 tablets once daily on non-dialysis days only. Do not take on dialysis days   valsartan (Diovan) 320 mg tablet 10/20/2024 Self   Sig: Take 1 tablet (320 mg) by mouth once daily.   vitamin B complex-vitamin C-folic acid (Nephrocaps) 1 mg capsule 10/20/2024 Self   Sig: Take 1 capsule by mouth once daily.      Facility-Administered Medications: None        The list below reflects the updated allergy list. Please review each documented allergy for additional clarification and justification.  Allergies  Reviewed by Anne Llanes RN on 10/21/2024        Severity Reactions Comments    Iodine High Hives, Itching, Unknown     Bee  "Pollen Not Specified Unknown     Bioflavonoids Not Specified Swelling     Citrus And Derivatives Not Specified Unknown     Codeine Not Specified Itching, Hives, Unknown Tolerates percocet   Tolerates percocet Tolerates percocet    Flowers Not Specified Itching     Hydromorphone Not Specified Itching     Shellfish Containing Products Not Specified Swelling SEAFOOD              Sources:   Good historian. Patient interviewed at bedside  Review of out patient fill history, OARRS, and patient interview  Discharge Summary 10/5/2024    Additional Comments:  Patient reports no changes to home medications. Had concerns of side effects of taking her medications, as she reports takes her medications as prescribed, and does not feel well after taking them, especially her morning medications make her feel dizzy      Kathie Blank PharmD  Transitions of Care Pharmacist  10/21/24     Secure Chat preferred   If no response call [id number] or tydyera \"Med Rec\"    "

## 2024-10-21 NOTE — H&P
Internal Medicine History and Physical   [facility]  October 21, 2024    Patient: Stacey Heath    Medical Record: [id number]        Subjective     HPI:  Ms. 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  Patient reports around 4am she ws sleeping, got up to use the bathroom and was dyspneic, had a headache and felt lightheaded. Denies cp, n/v at the time. States she can walk about 20-30ft on flat ground or a few steps on stairs before becoming short of breath. Not on home o2 but says she needs to fu with pulmonology to complete waking o2 test to see if she needs bipap at home. Reports adherence to medications and HD. Does hd for 3hrs T/Th/Sat and gets 2l removed. Last session was Sat. Says after she takes her morning bp pills she feels 'drunk' but is fine later in the day. Bps at home which she checks in am and pm are around 167-170 systolic. Her dry weight is 51.5kg. Pt still makes urine and usually voids bid. States the son of her best friend, who passed away 2 months ago, passed away tragically a few days ago and she has been sad/disturbed by this. Denies SI/HI. Denies vision changes, n/v, abd pain, chest pain, dysuria, hematuria, constipation, diarrhea, melena, hematochezia.          ED Course:  T 36.8  HR 89 RR 16  /96  O2 94% on nonrebreather  Labs  CBC: WBC 9.1, Hgb 9.7, plt 194  RFP: Na 140, K 4.9, Cl 100, HCO3 22, BUN 65, Cr 8.48, Ca 9.5, P 8.2, Mg 2.33  LFTs: , AST 16, ALT 13, Tbili 0.6  Coags: INR 1.7, PT 18.8, PTT 45    Trop 41>38  BNP 2440              Imaging  CXR  IMPRESSION:  1. Mild interstitial edema and small left pleural effusion.      ED interventions:  Nitroglycerin gtt, bipap      PMH: As per HPI    PSH:    Past Surgical History:   Procedure Laterality Date    APPENDECTOMY  03/07/2017    Appendectomy    CT ABDOMEN  ANGIOGRAM W AND/OR WO IV CONTRAST  4/23/2023    CT ABDOMEN ANGIOGRAM W AND/OR WO IV CONTRAST [facility] CT    OTHER SURGICAL HISTORY  03/07/2017    Cystoscopy With Pyeloscopy With Removal Of Calculus    OTHER SURGICAL HISTORY  03/07/2017    Anoscopy For Polyp Removal    OTHER SURGICAL HISTORY  12/08/2021    Arteriovenous fistula creation procedure    OTHER SURGICAL HISTORY  12/08/2021    Dialysis tunneled catheter placement    TUBAL LIGATION  03/07/2017    Tubal Ligation        Social Hx:  Tobacco: former, quit 3-4 years ago. Smoked ~1 pck q3days from age 19.  Alcohol: denies  Drugs: denies      Family Hx:    Family History   Problem Relation Name Age of Onset    Other (Cerebrovascular Accident) Father      Heart failure Paternal Grandmother      Breast cancer Paternal Grandmother      Other (Primary Cervical Cancer) Paternal Grandmother      Diabetes Paternal Grandfather      Breast cancer Father's Sister      Ovarian cancer Father's Sister          Allergies:    Allergies   Allergen Reactions    Iodine Hives, Itching and Unknown    Bee Pollen Unknown    Bioflavonoids Swelling    Citrus And Derivatives Unknown    Codeine Itching, Hives and Unknown     Tolerates percocet   Tolerates percocet    Tolerates percocet    Flowers Itching    Hydromorphone Itching    Shellfish Containing Products Swelling     SEAFOOD        Medications:  Albuterol prn  Albuterol neb  Apixaban 5mg every day  ASA 81mg every day  Atorvastatin 80mg at bedtime  Calcium acetate 1334mg tid  Carvedilol 50mg bid  Clonidine 0.2mg tid  Epo 24736 three times per week  Fluticasone  Breo ellipta every day  Gabapentin 300mg at bedtime  Hydralazine 100mg tid  Hydroxyzine 25mg at bedtime prn  Imdur 120mg every day  Melatonin 5mg at bedtime  Nifedipine 90mg every day  Pantoprazole 40mg every day  PEG 17g every day  Sumatriptan 25mg PRN  Torsemide 40mg on non HD days  Valsartan 320mg every day  Nephrocaps 1mg qd          Objective   Vitals  Visit Vitals  /52    Pulse 92   Temp 36.8 °C (98.2 °F)   Resp 16        Intake/Output  No intake/output data recorded.      Physical Exam  General: Awake, alert, conversant, appears stated age, NAD  HEENT: Normocephalic, atraumatic. PEERL. EOMI. No scleral icterus. MMM  Cardiac: Normal RR. S1&S2. Systolic murmur loudest on R sternal border   Respiratory: Crackles at bases b/l.  normal work of breathing on 2L NC  Abdomen: +BS. Soft, non tender, non distended, no pitting edema  Extremities: Rue avf with palpable thrill, audible bruit. No peripheral edema b/l, no asymmetry noted, warm  Skin: No suspect lesions or rashes noted on visible skin  Neuro: Aox4. CN II-XII intact. No focal defecits. Moving all limbs spontaneously, follows commands  Psych: Appropriate mood and behavior. Coherent thought process       Medications:   Scheduled medications  carvedilol, 25 mg, oral, BID  cloNIDine, 0.2 mg, oral, TID  hydrALAZINE, 100 mg, oral, TID  NIFEdipine ER, 90 mg, oral, Daily before breakfast  valsartan, 320 mg, oral, Daily      Continuous medications  nitroglycerin, 5-200 mcg/min, Last Rate: Stopped (10/21/24 0725)      PRN medications  PRN medications: oxygen       Labs  Results from last 7 days   Lab Units 10/21/24  0634   WBC AUTO x10*3/uL 9.1   HEMOGLOBIN g/dL 9.7*   HEMATOCRIT % 33.3*   PLATELETS AUTO x10*3/uL 194     Results from last 7 days   Lab Units 10/21/24  0634   SODIUM mmol/L 140   POTASSIUM mmol/L 4.9   CO2 mmol/L 22   ANION GAP mmol/L 23*   BUN mg/dL 65*   CREATININE mg/dL 8.48*   GLUCOSE mg/dL 87   EGFR mL/min/1.73m*2 5*   MAGNESIUM mg/dL 2.33      Results from last 7 days   Lab Units 10/21/24  0634   ALT U/L 13   AST U/L 16   ALK PHOS U/L 120*      Results from last 7 days   Lab Units 10/21/24  0634   INR  1.7*     Results from last 7 days   Lab Units 10/21/24  0900   FIO2 % 21     Results from last 7 days   Lab Units 10/21/24  0900 10/21/24  0610   POCT PH, VENOUS pH 7.36 7.36   POCT PCO2, VENOUS mm Hg 46 46           No lab  "exists for component: \"BNPRESU\", \"CPKT\"      trophs:10       Imaging  === 10/21/24 ===    XR CHEST 1 VIEW    - Impression -  1. Mild interstitial edema and small left pleural effusion.    I personally reviewed the image(s)/study and resident interpretation  as stated by Dr. Marti Blair MD. I agree with the findings as  stated. This study was interpreted at [facility], [place], [place].    MACRO:  None    Signed by: Deonna Hawkins 10/21/2024 9:55 AM  Dictation workstation:   [id number]   === 10/07/24 ===    CT HEAD WO IV CONTRAST    - Impression -  1.  No acute intracranial hemorrhage or acute territorial infarct.          MACRO:  None.    Signed by: Shelli Cochran 10/7/2024 8:02 PM  Dictation workstation:   [id number]               Assessment/Plan   Ms. 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 Pt reports adherence to hd and meds, fluid restriction so perhaps poorly controlled bp and recent stressors are exacerbating factors. Trop around baseline, no new EKG changes so ACS less likely as a trigger. Given patient stable on 2L NC with bps in 160s after being on a nitroglycerin gtt, nephrology planning for HD on 10/22 per normal schedule.      #Acute hypoxic respiratory failure  #Flash pulmonary edema  #Hypertensive emergency  #Hypervolemia  #HFpEF  ::Initially on nitroglycerin gtt in ED for bp 239/96, transitioned to home bp meds. Was on NRB>BiPAP now on 2L nc  ::CXR: Mild interstitial edema and small left pleural effusion   ::TTE (4/2024): EF 60-65%, severe LVH, LA mild-moderately dilated, mod TR, RVSP 52.3mmHg  ::BNP 2440. Trop around baseline, no new EKG changes so ACS less likely as cause for adhf and pulm edema  -c/w carvedilol 25mg bid, " clonidine 0.2mg tid, isosorbide mononitrate 120mg every day, hydralazine 100mg tid, nifedipine 90mg qam, torsemide 20mg (on non HD days), valsartan 320 every day  -fu CT head  -IV lasix 60mg x1  -Strict I/Os, daily weights      #ESRD on HD T/Th/S    ::HD T/Th/Sat at [facility] via RUE AVF  - c/w calcium acetate 667mg tid, Nephrocaps 1mg every day  - Strict I/Os, daily weights  - Nephro HD consult      #CAD  -c/w ASA 81mg every day, atorvastatin 80mg daily     #T2DM  #Polyneuropathy   ::A1c 5.2% (6/2024)  -c/w gabapentin 300mg nightly   -Diet controlled, not on meds  -Accuchecks qam and at bedtime    #COPD  -c/w breo ellipta, albuterol nebs, albuterol prn, flonase    #GERD  -c/w home protonix 40mg daily       #H/o L brachial DVT  -c/w Eliquis 5mg BID     #Depressed mood  - Patient with recent stressors. Denies SI/HI. Discussed outpatient referral for counseling        F: Replete with caution  E: Replete PRN  N: Renal, 1L fluid restriction  A: PIV  O2: 2L NC  DVT ppx: home eliquis    Code Status: Full (confirmed on admission)  Contact: Hai (): [phone], Diya (daughter) [phone]      Clarissa Dean MD  Internal Medicine, PGY-2

## 2024-10-21 NOTE — PROGRESS NOTES
Patient was handed off to me from the previous team. For full history, physical, and prior ED course, please see previous provider note prior to patient handoff. This is an addendum to the record.    HPI/prior hospital course:   In brief, patient is 54-year-old female with past medical history of HTN, HLD, DM, CAD, asthma/COPD, ESRD on T/Th/Sa HD, CHF who presented in acute respiratory distress with working diagnosis being scape.  Treated by prior team with BiPAP and nitroglycerin with improvement in blood pressures and respiratory status.  Handed off pending labs and reassessment after treatment to determine floor versus ICU appropriateness.  Hospital Course/MDM:  Labs notable for expected elevation in BNP to 2440, troponin near baseline at 41, potassium 4.9 on CMP.  Chest x-ray with mild interstitial edema and left pleural effusion.  Patient weaned off BiPAP to 2 L nasal cannula.  Nitroglycerin drip discontinued around time of shift change and home medications ordered.  Patient did verbally confirm that she is taking hydralazine, carvedilol, clonidine, valsartan, nifedipine.  Patient observed in the ED for several hours after discontinuation of BiPAP with no increased work of breathing, satting well on 2 L nasal cannula.  Blood pressure remained stably controlled on home BP medications.  Case discussed with medicine and patient admitted to [facility] for further management.    Disposition:  Admitted medicine    Patient discussed with Dr. Graciela Mcneal MD, PhD  Emergency Medicine PGY3

## 2024-10-22 ENCOUNTER — APPOINTMENT (OUTPATIENT)
Dept: DIALYSIS | Facility: HOSPITAL | Age: 54
End: 2024-10-22
Payer: COMMERCIAL

## 2024-10-22 LAB
ALBUMIN SERPL BCP-MCNC: 3.5 G/DL (ref 3.4–5)
ANION GAP SERPL CALC-SCNC: 22 MMOL/L (ref 10–20)
BASOPHILS # BLD AUTO: 0.07 X10*3/UL (ref 0–0.1)
BASOPHILS NFR BLD AUTO: 1.2 %
BUN SERPL-MCNC: 85 MG/DL (ref 6–23)
CALCIUM SERPL-MCNC: 8.6 MG/DL (ref 8.6–10.6)
CHLORIDE SERPL-SCNC: 98 MMOL/L (ref 98–107)
CO2 SERPL-SCNC: 23 MMOL/L (ref 21–32)
CREAT SERPL-MCNC: 10.47 MG/DL (ref 0.5–1.05)
EGFRCR SERPLBLD CKD-EPI 2021: 4 ML/MIN/1.73M*2
EOSINOPHIL # BLD AUTO: 0.73 X10*3/UL (ref 0–0.7)
EOSINOPHIL NFR BLD AUTO: 12.6 %
ERYTHROCYTE [DISTWIDTH] IN BLOOD BY AUTOMATED COUNT: 18.1 % (ref 11.5–14.5)
FLUAV RNA RESP QL NAA+PROBE: NOT DETECTED
FLUBV RNA RESP QL NAA+PROBE: NOT DETECTED
GLUCOSE BLD MANUAL STRIP-MCNC: 137 MG/DL (ref 74–99)
GLUCOSE BLD MANUAL STRIP-MCNC: 97 MG/DL (ref 74–99)
GLUCOSE SERPL-MCNC: 91 MG/DL (ref 74–99)
HCT VFR BLD AUTO: 27.9 % (ref 36–46)
HGB BLD-MCNC: 8.7 G/DL (ref 12–16)
IMM GRANULOCYTES # BLD AUTO: 0.02 X10*3/UL (ref 0–0.7)
IMM GRANULOCYTES NFR BLD AUTO: 0.3 % (ref 0–0.9)
LYMPHOCYTES # BLD AUTO: 1.11 X10*3/UL (ref 1.2–4.8)
LYMPHOCYTES NFR BLD AUTO: 19.1 %
MAGNESIUM SERPL-MCNC: 2.46 MG/DL (ref 1.6–2.4)
MCH RBC QN AUTO: 29.4 PG (ref 26–34)
MCHC RBC AUTO-ENTMCNC: 31.2 G/DL (ref 32–36)
MCV RBC AUTO: 94 FL (ref 80–100)
MONOCYTES # BLD AUTO: 0.5 X10*3/UL (ref 0.1–1)
MONOCYTES NFR BLD AUTO: 8.6 %
NEUTROPHILS # BLD AUTO: 3.38 X10*3/UL (ref 1.2–7.7)
NEUTROPHILS NFR BLD AUTO: 58.2 %
NRBC BLD-RTO: 0 /100 WBCS (ref 0–0)
PHOSPHATE SERPL-MCNC: 8.9 MG/DL (ref 2.5–4.9)
PLATELET # BLD AUTO: 168 X10*3/UL (ref 150–450)
POTASSIUM SERPL-SCNC: 5.7 MMOL/L (ref 3.5–5.3)
RBC # BLD AUTO: 2.96 X10*6/UL (ref 4–5.2)
SARS-COV-2 RNA RESP QL NAA+PROBE: NOT DETECTED
SODIUM SERPL-SCNC: 137 MMOL/L (ref 136–145)
WBC # BLD AUTO: 5.8 X10*3/UL (ref 4.4–11.3)

## 2024-10-22 PROCEDURE — 99232 SBSQ HOSP IP/OBS MODERATE 35: CPT | Performed by: STUDENT IN AN ORGANIZED HEALTH CARE EDUCATION/TRAINING PROGRAM

## 2024-10-22 PROCEDURE — 2500000001 HC RX 250 WO HCPCS SELF ADMINISTERED DRUGS (ALT 637 FOR MEDICARE OP): Performed by: STUDENT IN AN ORGANIZED HEALTH CARE EDUCATION/TRAINING PROGRAM

## 2024-10-22 PROCEDURE — 90935 HEMODIALYSIS ONE EVALUATION: CPT | Performed by: INTERNAL MEDICINE

## 2024-10-22 PROCEDURE — 5A1D70Z PERFORMANCE OF URINARY FILTRATION, INTERMITTENT, LESS THAN 6 HOURS PER DAY: ICD-10-PCS | Performed by: INTERNAL MEDICINE

## 2024-10-22 PROCEDURE — 8010000001 HC DIALYSIS - HEMODIALYSIS PER DAY

## 2024-10-22 PROCEDURE — 87635 SARS-COV-2 COVID-19 AMP PRB: CPT | Performed by: STUDENT IN AN ORGANIZED HEALTH CARE EDUCATION/TRAINING PROGRAM

## 2024-10-22 PROCEDURE — 2500000004 HC RX 250 GENERAL PHARMACY W/ HCPCS (ALT 636 FOR OP/ED): Performed by: STUDENT IN AN ORGANIZED HEALTH CARE EDUCATION/TRAINING PROGRAM

## 2024-10-22 PROCEDURE — 82947 ASSAY GLUCOSE BLOOD QUANT: CPT

## 2024-10-22 PROCEDURE — 2500000001 HC RX 250 WO HCPCS SELF ADMINISTERED DRUGS (ALT 637 FOR MEDICARE OP)

## 2024-10-22 PROCEDURE — 1100000001 HC PRIVATE ROOM DAILY

## 2024-10-22 PROCEDURE — 83735 ASSAY OF MAGNESIUM: CPT

## 2024-10-22 PROCEDURE — 80069 RENAL FUNCTION PANEL: CPT

## 2024-10-22 PROCEDURE — 87632 RESP VIRUS 6-11 TARGETS: CPT | Performed by: STUDENT IN AN ORGANIZED HEALTH CARE EDUCATION/TRAINING PROGRAM

## 2024-10-22 PROCEDURE — 36415 COLL VENOUS BLD VENIPUNCTURE: CPT

## 2024-10-22 PROCEDURE — 99221 1ST HOSP IP/OBS SF/LOW 40: CPT | Performed by: INTERNAL MEDICINE

## 2024-10-22 PROCEDURE — 85025 COMPLETE CBC W/AUTO DIFF WBC: CPT

## 2024-10-22 RX ORDER — METOCLOPRAMIDE HYDROCHLORIDE 5 MG/ML
10 INJECTION INTRAMUSCULAR; INTRAVENOUS ONCE
Status: COMPLETED | OUTPATIENT
Start: 2024-10-22 | End: 2024-10-22

## 2024-10-22 RX ORDER — ACETAMINOPHEN 325 MG/1
650 TABLET ORAL EVERY 6 HOURS PRN
Status: DISCONTINUED | OUTPATIENT
Start: 2024-10-22 | End: 2024-10-24 | Stop reason: HOSPADM

## 2024-10-22 RX ORDER — DIPHENHYDRAMINE HYDROCHLORIDE 50 MG/ML
25 INJECTION INTRAMUSCULAR; INTRAVENOUS ONCE
Status: COMPLETED | OUTPATIENT
Start: 2024-10-22 | End: 2024-10-22

## 2024-10-22 ASSESSMENT — COGNITIVE AND FUNCTIONAL STATUS - GENERAL
MOBILITY SCORE: 24
DAILY ACTIVITIY SCORE: 24

## 2024-10-22 ASSESSMENT — PAIN SCALES - GENERAL
PAINLEVEL_OUTOF10: 5 - MODERATE PAIN
PAINLEVEL_OUTOF10: 6
PAINLEVEL_OUTOF10: 5 - MODERATE PAIN
PAINLEVEL_OUTOF10: 0 - NO PAIN
PAINLEVEL_OUTOF10: 9

## 2024-10-22 ASSESSMENT — PAIN - FUNCTIONAL ASSESSMENT
PAIN_FUNCTIONAL_ASSESSMENT: 0-10
PAIN_FUNCTIONAL_ASSESSMENT: NO/DENIES PAIN

## 2024-10-22 ASSESSMENT — PAIN DESCRIPTION - LOCATION: LOCATION: HEAD

## 2024-10-22 NOTE — CONSULTS
Reason For Consult  ESRD on HD    History Of Present Illness  Stacey Heath is a 54 y.o. female  with h/o ESRD on HD at [facility] E TTS Dr Madi GUILLERMO with repeated admissions for uncontrolled htn and pulmonary edema admitted again for the same.   Seen on HD, BP had been well controlled yesterday with improvement in SoB, currently SoB improved  C/o dizziness in the morning ~15 min after taking her medications. On further questioning, states has dizziness on moving her head, denies dizziness on standing up.     Past Medical History  She has a past medical history of Bacteremia due to methicillin resistant Staphylococcus aureus (07/08/2024), Cardiac/pericardial tamponade (01/20/2024), CHF (congestive heart failure), COPD (chronic obstructive pulmonary disease) (Multi), Coronary artery disease, Disorder of sweat glands (02/25/2023), Dry eye syndrome of bilateral lacrimal glands (03/07/2017), ESRD (end stage renal disease) (Multi), Essential (primary) hypertension (12/27/2022), History of acute pancreatitis (12/21/2020), Low grade squamous intraepithelial lesion (LGSIL) on cervicovaginal cytologic smear (02/25/2023), Migraines, 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 (02/25/2023), and Type 2 myocardial infarction (Multi) (01/20/2024).    Surgical History  She has a past surgical history that includes Tubal ligation (03/07/2017); Other surgical history (03/07/2017); Appendectomy (03/07/2017); Other surgical history (03/07/2017); Other surgical history (12/08/2021); Other surgical history (12/08/2021); and CT angio abdomen w and or wo IV IV contrast (4/23/2023).     Social History  She reports that she has quit smoking. Her smoking use included cigarettes. She has been exposed to tobacco smoke. She has never used smokeless tobacco. She reports that she does not currently use alcohol. She reports that she does not currently use drugs after having used the following drugs: Cocaine and Marijuana.    Family History  Family  "History   Problem Relation Name Age of Onset    Other (Cerebrovascular Accident) Father      Heart failure Paternal Grandmother      Breast cancer Paternal Grandmother      Other (Primary Cervical Cancer) Paternal Grandmother      Diabetes Paternal Grandfather      Breast cancer Father's Sister      Ovarian cancer Father's Sister          Allergies  Iodine, Bee pollen, Bioflavonoids, Citrus and derivatives, Codeine, Flowers, Hydromorphone, and Shellfish containing products    Review of Systems  RoS negative for all other systems except as noted above.       Physical Exam  No distress  HEENT:  moist, no pallor  No edema dar LE  Breath sounds dar equal, clear  S1 S2 regular, normal, no rub or murmur  Abdomen soft  AAO x3, non focal     I&O 24HR    Intake/Output Summary (Last 24 hours) at 10/22/2024 1704  Last data filed at 10/22/2024 1200  Gross per 24 hour   Intake 1740 ml   Output 1700 ml   Net 40 ml       Vitals 24HR  Heart Rate:  [76-97]   Temp:  [36 °C (96.8 °F)-36.6 °C (97.9 °F)]   Resp:  [16-20]   BP: (126-183)/(62-76)   Height:  [139.7 cm (4' 7\")]   Weight:  [52.6 kg (115 lb 15.4 oz)-63.3 kg (139 lb 8.8 oz)]   SpO2:  [94 %-100 %]     Relevant Results  Results from last 72 hours   Lab Units 10/22/24  0730 10/21/24  0634   SODIUM mmol/L 137 140   POTASSIUM mmol/L 5.7* 4.9   CHLORIDE mmol/L 98 100   CO2 mmol/L 23 22   BUN mg/dL 85* 65*   PHOSPHORUS mg/dL 8.9* 8.2*   HEMOGLOBIN g/dL 8.7* 9.7*       apixaban, 5 mg, oral, BID  aspirin, 81 mg, oral, Daily  atorvastatin, 80 mg, oral, Nightly  calcium acetate, 1,334 mg, oral, TID  carvedilol, 50 mg, oral, BID  cloNIDine, 0.2 mg, oral, TID  fluticasone, 2 spray, Each Nostril, Daily  fluticasone furoate-vilanteroL, 1 puff, inhalation, Daily  gabapentin, 300 mg, oral, Nightly  hydrALAZINE, 100 mg, oral, TID  isosorbide mononitrate ER, 120 mg, oral, Daily  melatonin, 5 mg, oral, Nightly  NIFEdipine ER, 90 mg, oral, Daily before breakfast  pantoprazole, 40 mg, oral, Daily " before breakfast  polyethylene glycol, 17 g, oral, Daily  [START ON 10/23/2024] torsemide, 40 mg, oral, Once per day on Sunday Monday Wednesday Friday  [START ON 10/23/2024] valsartan, 320 mg, oral, q PM  vitamin B complex-vitamin C-folic acid, 1 capsule, oral, Daily        Assessment/Plan     54 year old admitted with recurrent admissions with uncontrolled htn and pulmonary edema.  Though described dizziness does not sound like orthostatic in nature, maybe worthwhile to change clonidine pill to patch to avoid rapid declines in BP after PO Clonidine. Possibly, she may have vertigo, consider evaluation for vertigo. Has had CT angio of abd in Apr 24, though no read on renal artery, can request radiology re-interpretation of angio to r/o dar CHANDAN as cause of uncontrolled htn and pulmonary edema.  Tolerating HD well per submitted orders 2 K 2.5 Ca 2.5 L UF  Ct Ca acetate and MVI  Ct rest of anti-hypertensives at current doses  Will continue to follow

## 2024-10-22 NOTE — CARE PLAN
The patient's goals for the shift include      The clinical goals for the shift include patient will rate her headache at a zero    Patient medicated times 2 with pRn Imitrex without relief . Now c/o congestion and bodyaches. mD notified ordered viral swabs.

## 2024-10-22 NOTE — NURSING NOTE
Report to Receiving RN:    Report To: PONCHO Mayorga  Time Report Called: 11:15  Hand-Off Communication: tolerated tx well, no issues or concerns, removed 1.3L of fluid, post tx /80, Dr. Allison is aware, pt will receive morning BP meds upon returning back to her floor, alert and stable post tx.  Complications During Treatment: No  Ultrafiltration Treatment: Yes  Medications Administered During Dialysis: No  Blood Products Administered During Dialysis: No  Labs Sent During Dialysis: Yes  Heparin Drip Rate Changes: N/A  Dialysis Catheter Dressing: Fistula, N/A  Last Dressing Change: N/A      Last Updated: 12:22 PM by LATESHA SANCHES

## 2024-10-22 NOTE — CARE PLAN
The patient's goals for the shift include      The clinical goals for the shift include patient will remain safe and free from falls and/or injury throughout the duration of this shift.    Over the shift, the patient did not make progress toward the following goals. Barriers to progression include   . Recommendations to address these barriers include   .

## 2024-10-22 NOTE — NURSING NOTE
Nursing Note - Admission   2020      10/21/2024    Patient admitted to [place] [address] from the emergency department this evening. Patient belongings are listed below with no home medications being brought with the patient this admission:     10/21/24 2135   Belongings Search   Belonging Search Yes   Belongings Searched by Pat De Guzman RN   Patient Belongings at Bedside   Belongings at Bedside Clothing;Electronic devices;Jewelry;Other valuables   Jewelry Earrings   Clothing Pants;Shirt;Bra;Underpants   Patient Electronics Cell phone;   Other Valuables Purse   Patient Belongings Sent Home/WIth Family   Belongings Sent Home/With Family None   Patient Belongings Sent to Locker/Safe/With Protective Services   Belongings Sent to Locker/Safe/With Protective Services  No belongings   Patient Medications   Medications brought by patient? No     Patient is a dialysis patient with an AV fistula to the right upper arm. HD days are Tuesday, Thursday and Saturday. Limb alert placed to the right arm upon admission.     Skin intact upon admission; no wounds noted.     Patient comes from home with her significant other. Patient ambulates and completes ADLs independently. Patient expressed no concerns with discharge at this time. Patient denies any current home care or home oxygen use at time of admission, however, patient comes to the unit with 2L of oxygen being administered via nasal cannula at this time.     Patient preferred pharmacy is located here at [facility]. Patient agreeable to Meds-to-Beds at discharge.     Patient is anticipated to return home with outpatient follow-up at this time with continued outpatient dialysis.     Patient plan of care ongoing.     Pat CARLSON, RN-BC

## 2024-10-22 NOTE — PROGRESS NOTES
Transitional Care Coordinator Progress Note:   Attempted to meet with pt this morning but she was off the unit in dialysis.  Attempted to meet with pt this afternoon to complete assessment.  Pt states she has a headache and requests I come back at another time.  Plan to attempt to see pt tomorrow.    Otilia Baker MSN, RN-BC  Transitional Care Coordinator (TCC)  [phone]

## 2024-10-22 NOTE — PROGRESS NOTES
Name: Stacey Heath  MRN: [id number]  Age: 54 y.o.      Date of Admission:  10/21/2024    Subjective     No acute events. Patient seen at dialysis today and noted to have headache. She states that she has noticed that she typically wakes up overnight with high blood pressures or  in  the morning. Discussed rescheduling blood pressure medications for more optimal control. Tylenol given fort HA.     Objective     Vitals:    10/22/24 1042 10/22/24 1200 10/22/24 1437 10/22/24 1551   BP:  (!) 183/76 153/66 151/62   BP Location:       Patient Position:       Pulse: 78 84 97 87   Resp:  18     Temp: 36 °C (96.8 °F) 36.6 °C (97.9 °F)     TempSrc: Temporal Temporal     SpO2:  98%     Weight:       Height:          I/O last 3 completed shifts:  In: 100 (1.6 mL/kg) [P.O.:100]  Out: 0 (0 mL/kg)   Weight: 63.3 kg   I/O this shift:  In: 1640 [P.O.:240; I.V.:1000; Other:400]  Out: 1700 [Other:1700]    Wt Readings from Last 3 Encounters:   10/22/24 63.3 kg (139 lb 8.8 oz)   10/07/24 52.6 kg (116 lb)   10/01/24 52.6 kg (116 lb)     Body mass index is 32.43 kg/m².    Physical Exam     Gen: Alert, chronically ill, in NAD  Head/Neck: normocephalic, atraumatic  Eyes: anicteric sclerae, noninjected conjunctivae  Mouth:  MMM  Heart: RRR, no murmurs, rubs, or gallops  Lungs: Tachypneic, lungs clear bilaterally  Abdomen: soft, NT, ND, no HSM, no palpable masses, good bowel sounds  Musculoskeletal: no joint swelling  Extremities: WWP, cap refill <2sec  Neurologic: Alert, symmetrical facies, phonates clearly, moves all extremities equally, responsive to touch  Skin: no rashes  Psychological: appropriate mood/affect          Labs    Results from last 7 days   Lab Units 10/22/24  0730 10/21/24  0634   SODIUM mmol/L 137 140   POTASSIUM mmol/L 5.7* 4.9   CHLORIDE mmol/L 98 100   CO2 mmol/L 23 22   BUN mg/dL 85* 65*   CREATININE mg/dL 10.47* 8.48*   GLUCOSE mg/dL 91 87   CALCIUM mg/dL 8.6 9.5      Results from last 7 days   Lab Units 10/22/24  2048  10/21/24  0634   WBC AUTO x10*3/uL 5.8 9.1   HEMOGLOBIN g/dL 8.7* 9.7*   HEMATOCRIT % 27.9* 33.3*   PLATELETS AUTO x10*3/uL 168 194      Results from last 7 days   Lab Units 10/21/24  0634   APTT seconds 45*   INR  1.7*       Microbiology  No results found for the last 90 days.       Radiology  CT head wo IV contrast   Final Result   No acute cortical infarct or intracranial hemorrhage.        Mild to moderate chronic microvascular ischemic change.        Signed by: Osorio Maguire 10/21/2024 7:25 PM   Dictation workstation:   [id number]      XR chest 1 view   Final Result   1. Mild interstitial edema and small left pleural effusion.        I personally reviewed the image(s)/study and resident interpretation   as stated by Dr. Marti Blair MD. I agree with the findings as   stated. This study was interpreted at , [place], [place].        MACRO:   None        Signed by: Deonna Hawkins 10/21/2024 9:55 AM   Dictation workstation:   [id number]      Point of Care Ultrasound    (Results Pending)        Medications    Scheduled medications  apixaban, 5 mg, oral, BID  aspirin, 81 mg, oral, Daily  atorvastatin, 80 mg, oral, Nightly  calcium acetate, 1,334 mg, oral, TID  carvedilol, 50 mg, oral, BID  cloNIDine, 0.2 mg, oral, TID  fluticasone, 2 spray, Each Nostril, Daily  fluticasone furoate-vilanteroL, 1 puff, inhalation, Daily  gabapentin, 300 mg, oral, Nightly  hydrALAZINE, 100 mg, oral, TID  isosorbide mononitrate ER, 120 mg, oral, Daily  melatonin, 5 mg, oral, Nightly  NIFEdipine ER, 90 mg, oral, Daily before breakfast  pantoprazole, 40 mg, oral, Daily before breakfast  polyethylene glycol, 17 g, oral, Daily  [START ON 10/23/2024] torsemide, 40 mg, oral, Once per day on Sunday Monday Wednesday Friday  [START ON 10/23/2024] valsartan, 320 mg, oral, q PM  vitamin B complex-vitamin C-folic acid, 1 capsule, oral, Daily      Continuous medications     PRN medications  PRN  medications: acetaminophen, albuterol, albuterol, hydrOXYzine HCL, oxygen, SUMAtriptan         Assessment and Plan:  Ms. 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 Pt reports adherence to hd and meds, fluid restriction so perhaps poorly controlled bp and recent stressors are exacerbating factors. Trop around baseline, no new EKG changes so ACS less likely as a trigger. Given patient stable on 2L NC with bps in 160s after being on a nitroglycerin gtt, nephrology planning for HD on 10/22 per normal schedule.        #Acute hypoxic respiratory failure  #Flash pulmonary edema  #Hypertensive emergency  #Hypervolemia  #HFpEF  ::Initially on nitroglycerin gtt in ED for bp 239/96, transitioned to home bp meds. Was on NRB>BiPAP now on 2L nc  ::CXR: Mild interstitial edema and small left pleural effusion   ::TTE (4/2024): EF 60-65%, severe LVH, LA mild-moderately dilated, mod TR, RVSP 52.3mmHg  ::BNP 2440. Trop around baseline, no new EKG changes so ACS less likely as cause for adhf and pulm edema  -c/w carvedilol 25mg bid, clonidine 0.2mg tid, isosorbide mononitrate 120mg every day, hydralazine 100mg tid, nifedipine 90mg qam, torsemide 20mg (on non HD days), valsartan 320 moved to evening   - CT head with no acute findings   -IV lasix 60mg x1  -Strict I/Os, daily weights        #ESRD on HD T/Th/S    ::HD T/Th/Sat at [facility] via R[facility] AVF  - c/w calcium acetate 667mg tid, Nephrocaps 1mg every day  - Strict I/Os, daily weights  - Nephro HD consult     #CAD  -c/w ASA 81mg every day, atorvastatin 80mg daily      #T2DM  #Polyneuropathy   ::A1c 5.2% (6/2024)  -c/w gabapentin 300mg nightly   -Diet controlled, not on meds  -Accuchecks qam and at bedtime     #COPD  -c/w breo ellipta, albuterol nebs, albuterol prn,  flonase     #GERD  -c/w home protonix 40mg daily        #H/o L brachial DVT  -c/w Eliquis 5mg BID      #Depressed mood  - Patient with recent stressors. Denies SI/HI. Discussed outpatient referral for counseling           F: Replete with caution  E: Replete PRN  N: Renal, 1L fluid restriction  A: PIV  O2: 2L NC  DVT ppx: home eliquis     Code Status: Full (confirmed on admission)  Contact: Hai (): [phone], Diya (daughter) [phone]      I spent 45 minutes in the care of this patient including calling family, chart review, examining patient, and appropriate documentation.

## 2024-10-22 NOTE — NURSING NOTE
.Report from Sending RN:    Report From: PONCHO Martínez  Recent Surgery of Procedure: No  Baseline Level of Consciousness (LOC): A&O X 3  Oxygen Use: Yes  Type: 2 Liter  Diabetic: Yes  Last BP Med Given Day of Dialysis: None  Last Pain Med Given: none  Lab Tests to be Obtained with Dialysis: No  Blood Transfusion to be Given During Dialysis: No  Available IV Access: Yes  Medications to be Administered During Dialysis: No  Continuous IV Infusion Running: No  Restraints on Currently or in the Last 24 Hours: No  Hand-Off Communication: Pt had no acute event overnight, vital signs stable. Not on precaution, no morning medication given. Travel by cart.  Dialysis Catheter Dressing: AVF  Last Dressing Change: N/A

## 2024-10-23 ENCOUNTER — DOCUMENTATION (OUTPATIENT)
Dept: PSYCHIATRY | Facility: HOSPITAL | Age: 54
End: 2024-10-23
Payer: COMMERCIAL

## 2024-10-23 LAB
ALBUMIN SERPL BCP-MCNC: 3.6 G/DL (ref 3.4–5)
ANION GAP SERPL CALC-SCNC: 18 MMOL/L (ref 10–20)
BUN SERPL-MCNC: 46 MG/DL (ref 6–23)
CALCIUM SERPL-MCNC: 9.4 MG/DL (ref 8.6–10.6)
CHLORIDE SERPL-SCNC: 100 MMOL/L (ref 98–107)
CO2 SERPL-SCNC: 29 MMOL/L (ref 21–32)
CREAT SERPL-MCNC: 7.3 MG/DL (ref 0.5–1.05)
EGFRCR SERPLBLD CKD-EPI 2021: 6 ML/MIN/1.73M*2
GLUCOSE BLD MANUAL STRIP-MCNC: 119 MG/DL (ref 74–99)
GLUCOSE BLD MANUAL STRIP-MCNC: 174 MG/DL (ref 74–99)
GLUCOSE BLD MANUAL STRIP-MCNC: 182 MG/DL (ref 74–99)
GLUCOSE SERPL-MCNC: 105 MG/DL (ref 74–99)
MAGNESIUM SERPL-MCNC: 2.49 MG/DL (ref 1.6–2.4)
PHOSPHATE SERPL-MCNC: 8 MG/DL (ref 2.5–4.9)
POTASSIUM SERPL-SCNC: 5.4 MMOL/L (ref 3.5–5.3)
SODIUM SERPL-SCNC: 142 MMOL/L (ref 136–145)

## 2024-10-23 PROCEDURE — 80069 RENAL FUNCTION PANEL: CPT | Performed by: STUDENT IN AN ORGANIZED HEALTH CARE EDUCATION/TRAINING PROGRAM

## 2024-10-23 PROCEDURE — 2500000002 HC RX 250 W HCPCS SELF ADMINISTERED DRUGS (ALT 637 FOR MEDICARE OP, ALT 636 FOR OP/ED)

## 2024-10-23 PROCEDURE — 99232 SBSQ HOSP IP/OBS MODERATE 35: CPT | Performed by: STUDENT IN AN ORGANIZED HEALTH CARE EDUCATION/TRAINING PROGRAM

## 2024-10-23 PROCEDURE — 82947 ASSAY GLUCOSE BLOOD QUANT: CPT

## 2024-10-23 PROCEDURE — 2500000001 HC RX 250 WO HCPCS SELF ADMINISTERED DRUGS (ALT 637 FOR MEDICARE OP): Performed by: STUDENT IN AN ORGANIZED HEALTH CARE EDUCATION/TRAINING PROGRAM

## 2024-10-23 PROCEDURE — 36415 COLL VENOUS BLD VENIPUNCTURE: CPT | Performed by: STUDENT IN AN ORGANIZED HEALTH CARE EDUCATION/TRAINING PROGRAM

## 2024-10-23 PROCEDURE — 2500000001 HC RX 250 WO HCPCS SELF ADMINISTERED DRUGS (ALT 637 FOR MEDICARE OP)

## 2024-10-23 PROCEDURE — 2500000004 HC RX 250 GENERAL PHARMACY W/ HCPCS (ALT 636 FOR OP/ED): Performed by: STUDENT IN AN ORGANIZED HEALTH CARE EDUCATION/TRAINING PROGRAM

## 2024-10-23 PROCEDURE — 1100000001 HC PRIVATE ROOM DAILY

## 2024-10-23 PROCEDURE — 83735 ASSAY OF MAGNESIUM: CPT | Performed by: STUDENT IN AN ORGANIZED HEALTH CARE EDUCATION/TRAINING PROGRAM

## 2024-10-23 RX ORDER — DIPHENHYDRAMINE HYDROCHLORIDE 50 MG/ML
25 INJECTION INTRAMUSCULAR; INTRAVENOUS ONCE
Status: COMPLETED | OUTPATIENT
Start: 2024-10-23 | End: 2024-10-23

## 2024-10-23 RX ORDER — METOCLOPRAMIDE HYDROCHLORIDE 5 MG/ML
10 INJECTION INTRAMUSCULAR; INTRAVENOUS ONCE
Status: COMPLETED | OUTPATIENT
Start: 2024-10-23 | End: 2024-10-23

## 2024-10-23 RX ORDER — CLONIDINE 0.2 MG/24H
1 PATCH, EXTENDED RELEASE TRANSDERMAL WEEKLY
Status: DISCONTINUED | OUTPATIENT
Start: 2024-10-23 | End: 2024-10-24

## 2024-10-23 ASSESSMENT — COGNITIVE AND FUNCTIONAL STATUS - GENERAL
WALKING IN HOSPITAL ROOM: A LITTLE
MOBILITY SCORE: 22
CLIMB 3 TO 5 STEPS WITH RAILING: A LITTLE
MOBILITY SCORE: 24
DAILY ACTIVITIY SCORE: 24
DAILY ACTIVITIY SCORE: 24

## 2024-10-23 ASSESSMENT — PAIN SCALES - GENERAL: PAINLEVEL_OUTOF10: 7

## 2024-10-23 ASSESSMENT — ACTIVITIES OF DAILY LIVING (ADL): LACK_OF_TRANSPORTATION: NO

## 2024-10-23 NOTE — PROGRESS NOTES
Name: Stacey Heath  MRN: [id number]  Age: 54 y.o.      Date of Admission:  10/21/2024    Subjective     No acute events. Still feels under the weather. Denies dizziness this morning.     Objective     Vitals:    10/23/24 0905 10/23/24 1213 10/23/24 1550 10/23/24 1552   BP: 166/69 130/62 143/72 143/72   BP Location:  Left arm     Patient Position:  Sitting     Pulse:  87 86    Resp:  18     Temp:  36.5 °C (97.7 °F)     TempSrc:  Tympanic     SpO2:  94%     Weight:       Height:          I/O last 3 completed shifts:  In: 1740 (31.5 mL/kg) [P.O.:340; I.V.:1000 (18.1 mL/kg); Other:400]  Out: 1700 (30.8 mL/kg) [Other:1700]  Weight: 55.2 kg   No intake/output data recorded.    Wt Readings from Last 3 Encounters:   10/23/24 55.2 kg (121 lb 11.1 oz)   10/07/24 52.6 kg (116 lb)   10/01/24 52.6 kg (116 lb)     Body mass index is 28.28 kg/m².    Physical Exam     Gen: Alert, chronically ill, in NAD  Head/Neck: normocephalic, atraumatic  Eyes: anicteric sclerae, noninjected conjunctivae  Mouth:  MMM  Heart: RRR, no murmurs, rubs, or gallops  Lungs: Tachypneic, lungs clear bilaterally  Abdomen: soft, NT, ND, no HSM, no palpable masses, good bowel sounds  Musculoskeletal: no joint swelling  Extremities: WWP, cap refill <2sec  Neurologic: Alert, symmetrical facies, phonates clearly, moves all extremities equally, responsive to touch  Skin: no rashes  Psychological: appropriate mood/affect          Labs    Results from last 7 days   Lab Units 10/23/24  0556 10/22/24  0730 10/21/24  0634   SODIUM mmol/L 142 137 140   POTASSIUM mmol/L 5.4* 5.7* 4.9   CHLORIDE mmol/L 100 98 100   CO2 mmol/L 29 23 22   BUN mg/dL 46* 85* 65*   CREATININE mg/dL 7.30* 10.47* 8.48*   GLUCOSE mg/dL 105* 91 87   CALCIUM mg/dL 9.4 8.6 9.5      Results from last 7 days   Lab Units 10/22/24  0730 10/21/24  0634   WBC AUTO x10*3/uL 5.8 9.1   HEMOGLOBIN g/dL 8.7* 9.7*   HEMATOCRIT % 27.9* 33.3*   PLATELETS AUTO x10*3/uL 168 194      Results from last 7 days    Lab Units 10/21/24  0634   APTT seconds 45*   INR  1.7*       Microbiology  No results found for the last 90 days.       Radiology  CT head wo IV contrast   Final Result   No acute cortical infarct or intracranial hemorrhage.        Mild to moderate chronic microvascular ischemic change.        Signed by: Osorio Maguire 10/21/2024 7:25 PM   Dictation workstation:   [id number]      XR chest 1 view   Final Result   1. Mild interstitial edema and small left pleural effusion.        I personally reviewed the image(s)/study and resident interpretation   as stated by Dr. Marti Blair MD. I agree with the findings as   stated. This study was interpreted at [facility], [place], [place].        MACRO:   None        Signed by: Deonna Hawkins 10/21/2024 9:55 AM   Dictation workstation:   [id number]      Point of Care Ultrasound    (Results Pending)        Medications    Scheduled medications  apixaban, 5 mg, oral, BID  aspirin, 81 mg, oral, Daily  atorvastatin, 80 mg, oral, Nightly  calcium acetate, 1,334 mg, oral, TID  carvedilol, 50 mg, oral, BID  cloNIDine, 1 patch, transdermal, Weekly  fluticasone, 2 spray, Each Nostril, Daily  fluticasone furoate-vilanteroL, 1 puff, inhalation, Daily  gabapentin, 300 mg, oral, Nightly  hydrALAZINE, 100 mg, oral, TID  isosorbide mononitrate ER, 120 mg, oral, Daily  melatonin, 5 mg, oral, Nightly  NIFEdipine ER, 90 mg, oral, Daily before breakfast  pantoprazole, 40 mg, oral, Daily before breakfast  polyethylene glycol, 17 g, oral, Daily  torsemide, 40 mg, oral, Once per day on Sunday Monday Wednesday Friday  valsartan, 320 mg, oral, q PM  vitamin B complex-vitamin C-folic acid, 1 capsule, oral, Daily      Continuous medications     PRN medications  PRN medications: acetaminophen, albuterol, albuterol, hydrOXYzine HCL, oxygen, SUMAtriptan         Assessment and Plan:  Ms. Heath is a 53yo F with h/o ESRD 2/2 (on HD T/Th/Sat via RUE AVF),  hypertension, HFpEF, T2DM, polyneuropathy, COPD, brachial DVT on Eliquis (4/14/2024) presenting with dyspnea admitted for acute hypoxic respiratory failure and hypertensive emergency 2/2 hypervolemia. Pt reports adherence to hd and meds, fluid restriction so perhaps poorly controlled bp and recent stressors are exacerbating factors. Trop around baseline, no new EKG changes so ACS less likely as a trigger. Given patient stable on 2L NC with bps in 160s after being on a nitroglycerin gtt. Undergoing dialysis and slight alteration of BP regimen with valsartan changed to nighttime and clonidine changed to patch.         #Acute hypoxic respiratory failure  #Flash pulmonary edema  #Hypertensive emergency  #Hypervolemia  #HFpEF  ::Initially on nitroglycerin gtt in ED for bp 239/96, transitioned to home bp meds. Was on NRB>BiPAP now on 2L nc  ::CXR: Mild interstitial edema and small left pleural effusion   ::TTE (4/2024): EF 60-65%, severe LVH, LA mild-moderately dilated, mod TR, RVSP 52.3mmHg  ::BNP 2440. Trop around baseline, no new EKG changes so ACS less likely as cause for adhf and pulm edema  -c/w carvedilol 25mg bid,  isosorbide mononitrate 120mg every day, hydralazine 100mg tid, nifedipine 90mg qam, torsemide 20mg (on non HD days), valsartan 320 moved to evening   - Clondine 0.2 TID changed to patch   - CT head with no acute findings   -Strict I/Os, daily weights        #ESRD on HD T/Th/S    ::HD T/Th/Sat at [facility] via [facility]UE AVF  - c/w calcium acetate 667mg tid, Nephrocaps 1mg every day  - Strict I/Os, daily weights  - Nephro HD consult     #CAD  -c/w ASA 81mg every day, atorvastatin 80mg daily      #T2DM  #Polyneuropathy   ::A1c 5.2% (6/2024)  -c/w gabapentin 300mg nightly   -Diet controlled, not on meds  -Accuchecks qam and at bedtime     #COPD  -c/w breo ellipta, albuterol nebs, albuterol prn, flonase     #GERD  -c/w home protonix 40mg daily        #H/o L brachial DVT  -c/w Eliquis 5mg BID      #Depressed mood  -  Patient with recent stressors. Denies SI/HI. Discussed outpatient referral for counseling           F: Replete with caution  E: Replete PRN  N: Regular   A: PIV  O2: 2L NC  DVT ppx: home eliquis     Code Status: Full (confirmed on admission)  Contact: Hai (): [phone], Diya (daughter) [phone]      I spent 45 minutes in the care of this patient including calling family, chart review, examining patient, and appropriate documentation.

## 2024-10-23 NOTE — PROGRESS NOTES
10/23/24 1002   Discharge Planning   Living Arrangements Spouse/significant other;Family members  (Home with fiance and nephew.)   Support Systems Spouse/significant other;Family members   Assistance Needed None   Type of Residence Private residence   Do you have animals or pets at home? No   Who is requesting discharge planning? Patient   Home or Post Acute Services None   Expected Discharge Disposition Home   Does the patient need discharge transport arranged? No  (Pt will call for a ride home.)   RoundTrip coordination needed? No   Has discharge transport been arranged? No   Patient expects to be discharged to: Home   Financial Resource Strain   How hard is it for you to pay for the very basics like food, housing, medical care, and heating? Not hard   Housing Stability   In the last 12 months, was there a time when you were not able to pay the mortgage or rent on time? N   At any time in the past 12 months, were you homeless or living in a shelter (including now)? N   Transportation Needs   In the past 12 months, has lack of transportation kept you from medical appointments or from getting medications? no   In the past 12 months, has lack of transportation kept you from meetings, work, or from getting things needed for daily living? No     Assessment Note:  Met with pt and introduced myself as care coordinator and member of the Care Transitions team for discharge planning.   Pt was independent prior to admission.  Pt only uses her walker when she doesn't feel well.  Pt drives or family provides transport to drs appts.  Pt's address, phone number and contact information was verified.  Pt states her insurance will be changing to [employer] next month.  Pt does not have any other questions/concerns at this time.     Previous Home Care: None  DME: Nebulizer, wheeled walker, and glucometer.  Pharmacy: [facility] Flanagan: Denies  PCP:  Pt has been to family medicine in the past but has not seen a physician  due to her frequent admissions.   Nephrologist is Dr. Barraza.  Dialysis:  [facility] on TTS (chair time 1200pm). Pt drives or nephew provides transportation.    Otilia Baker MSN, RN-BC  Transitional Care Coordinator (TCC)  [phone]

## 2024-10-23 NOTE — PROGRESS NOTES
Stacey Heath  Age: 54 y.o.  MRN: [id number]  Date: 10/23/2024  Location of service: in community    Program Details  Medicaid Community Clinical Case Management  Status: Enrolled  Effective Dates: 10/17/2023 - present  Responsible Staff: NAREN Davidson      Goals Reviewed:      Problem: Financial Stressors       Goal: Assistance with financial concerns               Summary:  This provider met with patient at [facility] as patient has been admitted. This provider listened with empathy as patient spoke about her experience that led her to go to the ED. Patient reports that the doctor is trying to switch some medications and the time of day to take the medications. Provider gave patient the phone number for the [facility] [employer] to gain assistance with reapplying for SNAP benefits.                      NAREN Davidson

## 2024-10-24 ENCOUNTER — HOSPITAL ENCOUNTER (INPATIENT)
Dept: CARDIOLOGY | Facility: HOSPITAL | Age: 54
Discharge: HOME | End: 2024-10-24
Payer: COMMERCIAL

## 2024-10-24 ENCOUNTER — PHARMACY VISIT (OUTPATIENT)
Dept: PHARMACY | Facility: CLINIC | Age: 54
End: 2024-10-24
Payer: COMMERCIAL

## 2024-10-24 ENCOUNTER — APPOINTMENT (OUTPATIENT)
Dept: DIALYSIS | Facility: HOSPITAL | Age: 54
End: 2024-10-24
Payer: COMMERCIAL

## 2024-10-24 VITALS
OXYGEN SATURATION: 99 % | RESPIRATION RATE: 18 BRPM | WEIGHT: 126.98 LBS | DIASTOLIC BLOOD PRESSURE: 71 MMHG | SYSTOLIC BLOOD PRESSURE: 167 MMHG | TEMPERATURE: 97.7 F | HEART RATE: 92 BPM | HEIGHT: 55 IN | BODY MASS INDEX: 29.39 KG/M2

## 2024-10-24 DIAGNOSIS — R00.2 PALPITATIONS: ICD-10-CM

## 2024-10-24 LAB
ADENOVIRUS RVP, VIRC: NOT DETECTED
ALBUMIN SERPL BCP-MCNC: 3.6 G/DL (ref 3.4–5)
ANION GAP SERPL CALC-SCNC: 21 MMOL/L (ref 10–20)
BODY SURFACE AREA: 1.5 M2
BUN SERPL-MCNC: 82 MG/DL (ref 6–23)
CALCIUM SERPL-MCNC: 9.4 MG/DL (ref 8.6–10.6)
CHLORIDE SERPL-SCNC: 100 MMOL/L (ref 98–107)
CO2 SERPL-SCNC: 25 MMOL/L (ref 21–32)
CREAT SERPL-MCNC: 9.34 MG/DL (ref 0.5–1.05)
EGFRCR SERPLBLD CKD-EPI 2021: 5 ML/MIN/1.73M*2
ENTEROVIRUS/RHINOVIRUS RVP, VIRC: NOT DETECTED
GLUCOSE BLD MANUAL STRIP-MCNC: 130 MG/DL (ref 74–99)
GLUCOSE BLD MANUAL STRIP-MCNC: 160 MG/DL (ref 74–99)
GLUCOSE SERPL-MCNC: 145 MG/DL (ref 74–99)
HUMAN BOCAVIRUS RVP, VIRC: NOT DETECTED
HUMAN CORONAVIRUS RVP, VIRC: NOT DETECTED
INFLUENZA A , VIRC: NOT DETECTED
INFLUENZA A H1N1-09 , VIRC: NOT DETECTED
INFLUENZA B PCR, VIRC: NOT DETECTED
MAGNESIUM SERPL-MCNC: 2.52 MG/DL (ref 1.6–2.4)
METAPNEUMOVIRUS , VIRC: NOT DETECTED
PARAINFLUENZA PCR, VIRC: NOT DETECTED
PHOSPHATE SERPL-MCNC: 7.3 MG/DL (ref 2.5–4.9)
POTASSIUM SERPL-SCNC: 5.6 MMOL/L (ref 3.5–5.3)
RSV PCR, RVP, VIRC: NOT DETECTED
SODIUM SERPL-SCNC: 140 MMOL/L (ref 136–145)

## 2024-10-24 PROCEDURE — 2500000002 HC RX 250 W HCPCS SELF ADMINISTERED DRUGS (ALT 637 FOR MEDICARE OP, ALT 636 FOR OP/ED)

## 2024-10-24 PROCEDURE — 2500000001 HC RX 250 WO HCPCS SELF ADMINISTERED DRUGS (ALT 637 FOR MEDICARE OP): Performed by: STUDENT IN AN ORGANIZED HEALTH CARE EDUCATION/TRAINING PROGRAM

## 2024-10-24 PROCEDURE — 82947 ASSAY GLUCOSE BLOOD QUANT: CPT

## 2024-10-24 PROCEDURE — 99239 HOSP IP/OBS DSCHRG MGMT >30: CPT | Performed by: STUDENT IN AN ORGANIZED HEALTH CARE EDUCATION/TRAINING PROGRAM

## 2024-10-24 PROCEDURE — 2500000001 HC RX 250 WO HCPCS SELF ADMINISTERED DRUGS (ALT 637 FOR MEDICARE OP)

## 2024-10-24 PROCEDURE — RXMED WILLOW AMBULATORY MEDICATION CHARGE

## 2024-10-24 PROCEDURE — 80069 RENAL FUNCTION PANEL: CPT | Performed by: STUDENT IN AN ORGANIZED HEALTH CARE EDUCATION/TRAINING PROGRAM

## 2024-10-24 PROCEDURE — 83735 ASSAY OF MAGNESIUM: CPT | Performed by: STUDENT IN AN ORGANIZED HEALTH CARE EDUCATION/TRAINING PROGRAM

## 2024-10-24 PROCEDURE — 90935 HEMODIALYSIS ONE EVALUATION: CPT | Performed by: INTERNAL MEDICINE

## 2024-10-24 PROCEDURE — 8010000001 HC DIALYSIS - HEMODIALYSIS PER DAY

## 2024-10-24 PROCEDURE — 36415 COLL VENOUS BLD VENIPUNCTURE: CPT | Performed by: STUDENT IN AN ORGANIZED HEALTH CARE EDUCATION/TRAINING PROGRAM

## 2024-10-24 PROCEDURE — 93246 EXT ECG>7D<15D RECORDING: CPT

## 2024-10-24 RX ORDER — HYDRALAZINE HYDROCHLORIDE 100 MG/1
100 TABLET, FILM COATED ORAL 3 TIMES DAILY
Qty: 90 TABLET | Refills: 0 | Status: ON HOLD | OUTPATIENT
Start: 2024-10-24 | End: 2024-11-23

## 2024-10-24 RX ORDER — ALBUTEROL SULFATE 1.25 MG/3ML
1.25 SOLUTION RESPIRATORY (INHALATION) EVERY 6 HOURS PRN
Qty: 90 ML | Refills: 11 | Status: ON HOLD | OUTPATIENT
Start: 2024-10-24

## 2024-10-24 RX ORDER — ATORVASTATIN CALCIUM 80 MG/1
80 TABLET, FILM COATED ORAL NIGHTLY
Qty: 30 TABLET | Refills: 0 | Status: ON HOLD | OUTPATIENT
Start: 2024-10-24

## 2024-10-24 RX ORDER — FLUTICASONE PROPIONATE 50 MCG
2 SPRAY, SUSPENSION (ML) NASAL DAILY
Qty: 16 G | Refills: 12 | Status: ON HOLD | OUTPATIENT
Start: 2024-10-24

## 2024-10-24 RX ORDER — ISOSORBIDE MONONITRATE 120 MG/1
120 TABLET, EXTENDED RELEASE ORAL DAILY
Qty: 30 TABLET | Refills: 0 | Status: ON HOLD | OUTPATIENT
Start: 2024-10-24 | End: 2024-11-23

## 2024-10-24 RX ORDER — CALCIUM ACETATE 667 MG/1
1334 CAPSULE ORAL
Qty: 180 CAPSULE | Refills: 0 | Status: ON HOLD | OUTPATIENT
Start: 2024-10-24 | End: 2024-11-23

## 2024-10-24 RX ORDER — CARVEDILOL 25 MG/1
50 TABLET ORAL 2 TIMES DAILY
Qty: 120 TABLET | Refills: 0 | Status: ON HOLD | OUTPATIENT
Start: 2024-10-24 | End: 2024-11-23

## 2024-10-24 RX ORDER — CLONIDINE 0.2 MG/24H
PATCH, EXTENDED RELEASE TRANSDERMAL
Qty: 5 PATCH | Refills: 1 | Status: ON HOLD | OUTPATIENT
Start: 2024-10-31 | End: 2025-10-24

## 2024-10-24 RX ORDER — PANTOPRAZOLE SODIUM 40 MG/1
40 TABLET, DELAYED RELEASE ORAL
Qty: 30 TABLET | Refills: 0 | Status: ON HOLD | OUTPATIENT
Start: 2024-10-24 | End: 2024-11-23

## 2024-10-24 RX ORDER — HYDROXYZINE HYDROCHLORIDE 25 MG/1
25 TABLET, FILM COATED ORAL EVERY 6 HOURS PRN
Qty: 30 TABLET | Refills: 0 | Status: ON HOLD | OUTPATIENT
Start: 2024-10-24

## 2024-10-24 RX ORDER — ASPIRIN 81 MG/1
81 TABLET ORAL DAILY
Qty: 30 TABLET | Refills: 0 | Status: ON HOLD | OUTPATIENT
Start: 2024-10-24

## 2024-10-24 RX ORDER — TORSEMIDE 20 MG/1
TABLET ORAL
Qty: 30 TABLET | Refills: 2 | Status: ON HOLD | OUTPATIENT
Start: 2024-10-24

## 2024-10-24 RX ORDER — NIFEDIPINE 90 MG/1
90 TABLET, EXTENDED RELEASE ORAL
Qty: 30 TABLET | Refills: 0 | Status: ON HOLD | OUTPATIENT
Start: 2024-10-24 | End: 2024-11-23

## 2024-10-24 RX ORDER — VALSARTAN 320 MG/1
320 TABLET ORAL EVERY EVENING
Status: ON HOLD
Start: 2024-10-24

## 2024-10-24 RX ORDER — POLYETHYLENE GLYCOL 3350 17 G/17G
17 POWDER, FOR SOLUTION ORAL DAILY
Qty: 510 G | Refills: 0 | Status: ON HOLD | OUTPATIENT
Start: 2024-10-24 | End: 2024-11-23

## 2024-10-24 RX ORDER — BENZONATATE 100 MG/1
200 CAPSULE ORAL 3 TIMES DAILY PRN
Status: DISCONTINUED | OUTPATIENT
Start: 2024-10-24 | End: 2024-10-24 | Stop reason: HOSPADM

## 2024-10-24 RX ORDER — CLONIDINE 0.2 MG/24H
1 PATCH, EXTENDED RELEASE TRANSDERMAL WEEKLY
Status: DISCONTINUED | OUTPATIENT
Start: 2024-10-24 | End: 2024-10-24 | Stop reason: HOSPADM

## 2024-10-24 ASSESSMENT — PAIN SCALES - GENERAL
PAINLEVEL_OUTOF10: 0 - NO PAIN
PAINLEVEL_OUTOF10: 6

## 2024-10-24 NOTE — PROGRESS NOTES
Stacey Heath is a 54 y.o. female on day 3 of admission presenting with Flash pulmonary edema.      Subjective   Seen on HD. Dizziness improved in hospital but still present   No SoB    Objective   Vitals 24HR  Heart Rate:  [84-92]   Temp:  [36.1 °C (97 °F)-36.6 °C (97.9 °F)]   BP: (142-195)/(64-83)   Weight:  [57.6 kg (126 lb 15.8 oz)]   SpO2:  [99 %]     Intake/Output last 3 Shifts:    Intake/Output Summary (Last 24 hours) at 10/24/2024 1312  Last data filed at 10/24/2024 1025  Gross per 24 hour   Intake 1000 ml   Output 2685 ml   Net -1685 ml       Physical Exam  No distress  HEENT:  moist, no pallor  No edema dar LE  Breath sounds dar equal, clear  S1 S2 regular, normal, no rub or murmur  Abdomen soft  AAO x3, non focal    apixaban, 5 mg, oral, BID  aspirin, 81 mg, oral, Daily  atorvastatin, 80 mg, oral, Nightly  calcium acetate, 1,334 mg, oral, TID  carvedilol, 50 mg, oral, BID  cloNIDine, 1 patch, transdermal, Weekly  fluticasone, 2 spray, Each Nostril, Daily  fluticasone furoate-vilanteroL, 1 puff, inhalation, Daily  gabapentin, 300 mg, oral, Nightly  hydrALAZINE, 100 mg, oral, TID  isosorbide mononitrate ER, 120 mg, oral, Daily  melatonin, 5 mg, oral, Nightly  NIFEdipine ER, 90 mg, oral, Daily before breakfast  pantoprazole, 40 mg, oral, Daily before breakfast  polyethylene glycol, 17 g, oral, Daily  torsemide, 40 mg, oral, Once per day on Sunday Monday Wednesday Friday  valsartan, 320 mg, oral, q PM  vitamin B complex-vitamin C-folic acid, 1 capsule, oral, Daily      Assessment/Plan      54 Year old with h/o ESRD on HD admitted with uncontrolled htn and pulmonary edema  Nephrology following for ESRD    #ESRD: Tolerating HD per submitted orders    # Anemia: HgB   Hemoglobin   Date Value Ref Range Status   10/22/2024 8.7 (L) 12.0 - 16.0 g/dL Final    start Epo 5000 units IV QHD    # MBD: Ca   Calcium   Date Value Ref Range Status   10/24/2024 9.4 8.6 - 10.6 mg/dL Final     Phosphorus   Date Value Ref Range  Status   10/24/2024 7.3 (H) 2.5 - 4.9 mg/dL Final     Comment:     The performance characteristics of phosphorus testing in heparinized plasma have been validated by the individual  laboratory site where testing is performed. Testing on heparinized plasma is not approved by the FDA; however, such approval is not necessary.     Parathyroid Hormone, Intact   Date Value Ref Range Status   05/31/2024 1,415.0 (H) 18.5 - 88.0 pg/mL Final     Vitamin D, 25-Hydroxy, Total   Date Value Ref Range Status   05/30/2024 18 (L) 30 - 100 ng/mL Final   continue  Ca acetate 3 tab PO TIDWM and daily renal MVI.     #Hypertension: BP has been acceptably controlled in hospital, ? Non compliance at home. Also does not allow UF more than 2 L.  Switch Clonidine to patch and r/o CHANDAN on prior CT reads with radiology, ct 2.5 g sodium restricted diet    #Volume status: continue UF as tolerated    Will continue to follow

## 2024-10-24 NOTE — NURSING NOTE
Report from Sending RN:    Report From: Randi  Recent Surgery of Procedure: No  Baseline Level of Consciousness (LOC): A and O x 4  Oxygen Use: Yes  Type: NC 2  Diabetic: No  Last BP Med Given Day of Dialysis: 130s SBP  Last Pain Med Given: None  Lab Tests to be Obtained with Dialysis: Yes  Blood Transfusion to be Given During Dialysis: No  Available IV Access: Yes  Medications to be Administered During Dialysis: No  Continuous IV Infusion Running: No  Restraints on Currently or in the Last 24 Hours: No  Hand-Off Communication: Had a quiet night  Dialysis Catheter Dressing: AVF  Last Dressing Change:  NA

## 2024-10-24 NOTE — CARE PLAN
The patient's goals for the shift include      The clinical goals for the shift include Patient will remain safe and free from any falls/injuries overnight    Over the shift, the patient did not make progress toward the following goals. Barriers to progression include ***. Recommendations to address these barriers include ***.

## 2024-10-24 NOTE — NURSING NOTE
.Report to Receiving RN:    Report To: PONCHO Leal  Time Report Called: 1489  Hand-Off Communication: Pt tolerated HD well, 14 min to the end complained back spasm. UF off Fluid remove 2.2 Liter Post /78 HR 92  Complications During Treatment: No  Ultrafiltration Treatment: No  Medications Administered During Dialysis: No  Blood Products Administered During Dialysis: No  Labs Sent During Dialysis: No  Heparin Drip Rate Changes: No  Dialysis Catheter Dressing: AVF  Last Dressing Change: N/A

## 2024-10-24 NOTE — DISCHARGE SUMMARY
Discharge Diagnosis  Flash pulmonary edema    Issues Requiring Follow-Up  Hypertension with initial HTN emergency requiring BIPAP  - noted to have morning hypertension as well as swings in blood pressure   - Moved valsartan to PM   - Changed clonidine pills to patch     #Palpitations   - 14 day caydenopakayley ordered and sent to outpatient cardiologist    Discharge Meds     Medication List      START taking these medications     cloNIDine 0.2 mg/24 hr patch; Commonly known as: Catapres-TTS; Apply one   patch on the skin and replace every 7 days, as directed. Do not start   before October 31, 2024.; Start taking on: October 31, 2024     CHANGE how you take these medications     valsartan 320 mg tablet; Commonly known as: Diovan; Take 1 tablet (320   mg) by mouth once daily in the evening.; What changed: when to take this     CONTINUE taking these medications     * albuterol 90 mcg/actuation inhaler; Commonly known as: ProAir HFA;   Inhale 2 puffs every 4 hours if needed for wheezing or shortness of   breath.   * albuterol 1.25 mg/3 mL nebulizer solution; Take 3 mL (1.25 mg) by   nebulization every 6 hours if needed for wheezing or shortness of breath.   aspirin 81 mg EC tablet; Take 1 tablet (81 mg) by mouth once daily.   atorvastatin 80 mg tablet; Commonly known as: Lipitor; Take 1 tablet (80   mg) by mouth once daily at bedtime.   Breo Ellipta 100-25 mcg/dose inhaler; Generic drug: fluticasone   furoate-vilanteroL; Inhale 1 puff once daily.   calcium acetate 667 mg capsule; Commonly known as: Phoslo; Take 2   capsules (1,334 mg) by mouth 3 times daily (morning, midday, late   afternoon).   carvedilol 25 mg tablet; Commonly known as: Coreg; Take 2 tablets (50   mg) by mouth 2 times a day.   Eliquis 5 mg tablet; Generic drug: apixaban; Take 1 tablet (5 mg) by   mouth 2 times a day.   epoetin joceline 10,000 unit/mL injection; Commonly known as:   Epogen,Procrit; Inject 1 mL (10,000 Units) under the skin 3 times a week.    fluticasone 50 mcg/actuation nasal spray; Commonly known as: Flonase;   Administer 2 sprays into each nostril once daily. Shake gently. Before   first use, prime pump. After use, clean tip and replace cap.   gabapentin 300 mg capsule; Commonly known as: Neurontin; Take 1 capsule   (300 mg) by mouth once daily at bedtime.   hydrALAZINE 100 mg tablet; Commonly known as: Apresoline; Take 1 tablet   (100 mg) by mouth 3 times a day.   hydrOXYzine HCL 25 mg tablet; Commonly known as: Atarax; Take 1 tablet   (25 mg) by mouth every 6 hours if needed for anxiety, allergies or   itching.   isosorbide mononitrate  mg 24 hr tablet; Commonly known as: Imdur;   Take 1 tablet (120 mg) by mouth once daily. Do not crush or chew.   melatonin 5 mg tablet; Take 1 tablet (5 mg) by mouth once daily at   bedtime.   NIFEdipine ER 90 mg 24 hr tablet; Commonly known as: Adalat CC; Take 1   tablet (90 mg) by mouth once daily in the morning. Take before meals. Do   not crush, chew, or split.   pantoprazole 40 mg EC tablet; Commonly known as: ProtoNix; Take 1 tablet   (40 mg) by mouth once daily in the morning. Take before meals. Do not   crush, chew, or split.   polyethylene glycol 17 gram/dose powder; Commonly known as: Glycolax,   Miralax; Take 17 g (1 scoop dissolved in liquid) by mouth once daily. Do   not start before July 26, 2024.   Renal Caps 1 mg capsule; Generic drug: vitamin B complex-vitamin C-folic   acid; Take 1 capsule by mouth once daily.   SUMAtriptan 25 mg tablet; Commonly known as: Imitrex; Take 1 tablet (25   mg) by mouth 1 time if needed for migraine. May repeat dose once in 2   hours if no relief.  Do not exceed 2 doses in 24 hours.   torsemide 20 mg tablet; Commonly known as: Demadex; Take 2 tablets once   daily on non-dialysis days only. Do not take on dialysis days  * This list has 2 medication(s) that are the same as other medications   prescribed for you. Read the directions carefully, and ask your doctor or    other care provider to review them with you.     STOP taking these medications     cloNIDine 0.2 mg tablet; Commonly known as: Catapres       Test Results Pending At Discharge  Pending Labs       Order Current Status    Respiratory Viral Panel In process            Hospital Course   Ms. Heath is a 55yo F with h/o ESRD 2/2 (on HD T/Th/Sat via RUE AVF), hypertension, HFpEF, T2DM, polyneuropathy, COPD, brachial DVT on Eliquis (4/14/2024) presenting with dyspnea admitted for acute hypoxic respiratory failure and hypertensive emergency 2/2 hypervolemia. Pt reports adherence to hd and meds, fluid restriction so perhaps poorly controlled bp and recent stressors are exacerbating factors.     Initial vitals remarkable for hypertension to 239/96, tachycardia to 104 as well as hypoxia placed on nonrebreather mask.  Remarkable for troponin elevation to 41 stable on recheck to 38.  BNP also significantly elevated to 2440.  Chest x-ray showed interstitial edema with small left pleural effusion.  Patient started on nitroglycerin drip as well as BiPAP.  Home blood pressure medications were resumed and was able to be weaned to 2 L nasal cannula.  Symptoms were likely due to flash pulmonary edema in setting of hypertensive emergency secondary to medication noncompliance.      Further discussion with patient on floor, she stated that she often has symptoms and her blood pressure was very high blood pressures in the morning.  Because of this we switched her valsartan to p.m. dosing as well as her clonidine to a patch for more stable levels.  Patient was in agreement with the plan.  Patient received dialysis per schedule in the hospital with her last session of dialysis on 10/24 (Thursday).  Patient also endorsed intermittent palpitations so Zio patch was ordered and placed on discharge.  Patient has follow-up with cardiologist where Zio patch can be reviewed.    Pertinent Physical Exam At Time of Discharge  Gen: Alert, chronically  ill, in NAD  Head/Neck: normocephalic, atraumatic  Eyes: anicteric sclerae, noninjected conjunctivae  Mouth:  MMM  Heart: RRR, no murmurs, rubs, or gallops  Lungs: No increased WOB, lungs clear bilaterally  Abdomen: soft, NT, ND, no HSM, no palpable masses, good bowel sounds  Musculoskeletal: no joint swelling  Extremities: WWP, cap refill <2sec  Neurologic: Alert, symmetrical facies, phonates clearly, moves all extremities equally, responsive to touch  Skin: no rashes  Psychological: appropriate mood/affect    Outpatient Follow-Up  Future Appointments   Date Time Provider Department [place]   10/30/2024 11:00 AM Bess Hurst RD [id number] [facility]   11/6/2024 11:00 AM LILLY LeahyCNP [id number] [facility]   11/8/2024 11:00 AM Sherri Gastelum MD [id number] [facility]   11/12/2024  4:00 PM Kacey Garzon MD [id number] [facility]   12/9/2024  1:40 PM Orlin Westbrook MD [id number] Academic   1/17/2025 11:40 AM Judy Alcaraz MD [id number] Academic   1/27/2025  4:00 PM Makenna Barraza MD [id number] Academic         Catrachita Valverde MD

## 2024-10-24 NOTE — PROGRESS NOTES
Name: Stacey Heath  MRN: [id number]  Age: 54 y.o.      Date of Admission:  10/21/2024    Subjective     No acute events. Still feels under the weather. Denies dizziness this morning.     Objective     Vitals:    10/24/24 1025 10/24/24 1155 10/24/24 1218 10/24/24 1758   BP:  (!) 195/83  (!) 199/91   BP Location:       Patient Position:       Pulse: 92      Resp:       Temp:   36.5 °C (97.7 °F)    TempSrc:   Tympanic    SpO2:       Weight:       Height:          No intake/output data recorded.  I/O this shift:  In: 1480 [P.O.:480; I.V.:600; Other:400]  Out: 2685 [Other:2685]    Wt Readings from Last 3 Encounters:   10/24/24 57.6 kg (126 lb 15.8 oz)   10/07/24 52.6 kg (116 lb)   10/01/24 52.6 kg (116 lb)     Body mass index is 29.51 kg/m².    Physical Exam     Gen: Alert, chronically ill, in NAD  Head/Neck: normocephalic, atraumatic  Eyes: anicteric sclerae, noninjected conjunctivae  Mouth:  MMM  Heart: RRR, no murmurs, rubs, or gallops  Lungs: No increased WOB, lungs clear bilaterally  Abdomen: soft, NT, ND, no HSM, no palpable masses, good bowel sounds  Musculoskeletal: no joint swelling  Extremities: WWP, cap refill <2sec  Neurologic: Alert, symmetrical facies, phonates clearly, moves all extremities equally, responsive to touch  Skin: no rashes  Psychological: appropriate mood/affect          Labs    Results from last 7 days   Lab Units 10/24/24  0719 10/23/24  0556 10/22/24  0730   SODIUM mmol/L 140 142 137   POTASSIUM mmol/L 5.6* 5.4* 5.7*   CHLORIDE mmol/L 100 100 98   CO2 mmol/L 25 29 23   BUN mg/dL 82* 46* 85*   CREATININE mg/dL 9.34* 7.30* 10.47*   GLUCOSE mg/dL 145* 105* 91   CALCIUM mg/dL 9.4 9.4 8.6      Results from last 7 days   Lab Units 10/22/24  0730 10/21/24  0634   WBC AUTO x10*3/uL 5.8 9.1   HEMOGLOBIN g/dL 8.7* 9.7*   HEMATOCRIT % 27.9* 33.3*   PLATELETS AUTO x10*3/uL 168 194      Results from last 7 days   Lab Units 10/21/24  0634   APTT seconds 45*   INR  1.7*       Microbiology  No results  found for the last 90 days.       Radiology  CT head wo IV contrast   Final Result   No acute cortical infarct or intracranial hemorrhage.        Mild to moderate chronic microvascular ischemic change.        Signed by: Osorio Maguire 10/21/2024 7:25 PM   Dictation workstation:   [id number]      XR chest 1 view   Final Result   1. Mild interstitial edema and small left pleural effusion.        I personally reviewed the image(s)/study and resident interpretation   as stated by Dr. Marti Blair MD. I agree with the findings as   stated. This study was interpreted at [facility], [place], [place].        MACRO:   None        Signed by: Deonna Hawkins 10/21/2024 9:55 AM   Dictation workstation:   [id number]      Point of Care Ultrasound    (Results Pending)   Holter Or Event Cardiac Monitor    (Results Pending)        Medications    Scheduled medications  apixaban, 5 mg, oral, BID  aspirin, 81 mg, oral, Daily  atorvastatin, 80 mg, oral, Nightly  calcium acetate, 1,334 mg, oral, TID  carvedilol, 50 mg, oral, BID  cloNIDine, 1 patch, transdermal, Weekly  fluticasone, 2 spray, Each Nostril, Daily  fluticasone furoate-vilanteroL, 1 puff, inhalation, Daily  gabapentin, 300 mg, oral, Nightly  hydrALAZINE, 100 mg, oral, TID  isosorbide mononitrate ER, 120 mg, oral, Daily  melatonin, 5 mg, oral, Nightly  NIFEdipine ER, 90 mg, oral, Daily before breakfast  pantoprazole, 40 mg, oral, Daily before breakfast  polyethylene glycol, 17 g, oral, Daily  torsemide, 40 mg, oral, Once per day on Sunday Monday Wednesday Friday  valsartan, 320 mg, oral, q PM  vitamin B complex-vitamin C-folic acid, 1 capsule, oral, Daily      Continuous medications     PRN medications  PRN medications: acetaminophen, albuterol, albuterol, benzonatate, hydrOXYzine HCL, oxygen, SUMAtriptan         Assessment and Plan:  Ms. 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 Pt reports adherence to hd and meds, fluid restriction so perhaps poorly controlled bp and recent stressors are exacerbating factors. Trop around baseline, no new EKG changes so ACS less likely as a trigger. Initially requiring nitroglycerin drip and supplemental oxygen, now weaned to room air with appropriate BP control after dialysis and adjustment of BP timing.         #Acute hypoxic respiratory failure  #Flash pulmonary edema  #Hypertensive emergency  #Hypervolemia  #HFpEF  ::Initially on nitroglycerin gtt in ED for bp 239/96, transitioned to home bp meds. Was on NRB>BiPAP now on 2L nc  ::CXR: Mild interstitial edema and small left pleural effusion   ::TTE (4/2024): EF 60-65%, severe LVH, LA mild-moderately dilated, mod TR, RVSP 52.3mmHg  ::BNP 2440. Trop around baseline, no new EKG changes so ACS less likely as cause for adhf and pulm edema  -c/w carvedilol 25mg bid,  isosorbide mononitrate 120mg every day, hydralazine 100mg tid, nifedipine 90mg qam, torsemide 20mg (on non HD days), valsartan 320 moved to evening   - Clondine 0.2 TID changed to patch   - CT head with no acute findings   -Strict I/Os, daily weights       #Intermnittent palpitations   - 14 day ziopatch ordered for DC with sent to primary cardiologist. Upcoming follow up     #ESRD on HD T/Th/S    ::HD T/Th/Sat at [facility] via ZOILA AVF  - c/w calcium acetate 667mg tid, Nephrocaps 1mg every day  - Strict I/Os, daily weights  - Nephro HD consult     #CAD  -c/w ASA 81mg every day, atorvastatin 80mg daily      #T2DM  #Polyneuropathy   ::A1c 5.2% (6/2024)  -c/w gabapentin 300mg nightly   -Diet controlled, not on meds  -Accuchecks qam and at bedtime     #COPD  -c/w breo ellipta, albuterol nebs, albuterol prn, flonase     #GERD  -c/w home protonix 40mg daily        #H/o L brachial  DVT  -c/w Eliquis 5mg BID      #Depressed mood  - Patient with recent stressors. Denies SI/HI. Discussed outpatient referral for counseling           F: Replete with caution  E: Replete PRN  N: Regular   A: PIV  O2: 2L NC  DVT ppx: home eliquis     Code Status: Full (confirmed on admission)  Contact: Hai (): [phone], Diya (daughter) [phone]      I spent 45 minutes in the care of this patient including calling family, chart review, examining patient, and appropriate documentation.

## 2024-10-29 ENCOUNTER — DOCUMENTATION (OUTPATIENT)
Dept: BEHAVIORAL HEALTH | Facility: CLINIC | Age: 54
End: 2024-10-29
Payer: COMMERCIAL

## 2024-10-29 NOTE — PROGRESS NOTES
[facility]  [facility]  [id number]  [facility] [address]  [phone]     [facility]  New Visit Note      Subjective   Patient ID: Stacey Heath is a 54 y.o. female with past medical history significant for Overweight, Congestive Heart Failure, Hypertension, diabetes, end stage renal disease, Anxiety, Nicotine dependence, Transplant, and Sleep disorder breathing.     11/6/2024: The patient is here alone today and was referred by Hospitalist Lyndon Tubbs MD, for comprehensive sleep medicine evaluation due to suspected sleep apnea, excessive daytime sleepiness/fatigue, difficulty falling/staying asleep, and Restless Legs Syndrome (RLS). She has dialysis every Tuesday, Thursday, and Saturday. Her BP was 196/68, and her PSO2 was 84% after walking to the clinic. MA rechecked the PSO2 on room air, and it was 90%. I instructed her to give 2 LO2 and PO2 back to 98% and recheck her BP at the end of the visit. The patient mentioned that she forgot to take anti-hypertension medication. To control the blood pressure better, instruct the patient to take anti-hypertension medication at bedtime and a water pill in the waking time regularly. At the end of the visit, I rechecked her BP, which leveled 193/ 70; the patient was asymptomatic. I instructed the patient to take her Hypertension medication when she arrived home ASA to prevent stroke. She verbalized understanding with it. We used a wheelchair with 2  O2 for ambulation for her, and her nephew came to the hospital to pick her up. Her  SS is 23, and her JUSTO is 20 today.    HPI  Patient had been having these symptoms for the past 3-4 years. Patient never had sleep study done yet.       SLEEP STUDY HISTORY: (personally reviewed raw data such as interpretation report, data sheet, hypnogram, and titration table if available and  applicable)  N/A    SLEEP-WAKE SCHEDULE  Bedtime: 9 PM on weekdays, same on weekends  Subjective sleep latency: 1 hour  Problems falling asleep: Yes  Number of awakenings: 4-5  times per night spontaneously for unknown reasons  Falls back asleep in  30 minutes  Problems staying asleep: Yes  Final wake time: 7-8 AM on weekdays, same on weekends  Out of bed time: 8-9 AM on weekdays, same on weekends  Shift work: No  Naps: Yes, 1 hour  Feels rested after a nap: No   Average sleep duration (excluding naps): 9-10 hours    SLEEP ENVIRONMENT  Sleep location: bed  Sleep status: sleeps with   Room is dark:  Yes  Room is quiet: Yes  Room is cool: Yes  Bed comfort: good    SLEEP HABITS:   Activities before bedtime: watch TV, use computer, cell phone  Activities in bed: no  Preferred sleep position: right side    SLEEP ROS:  Night symptoms: POSITIVE for nasal congestion , night sweats during sleep, and waking up with racing heart  Morning symptoms: POSITIVE for unrefreshing sleep and morning headache  Daytime symptoms POSITIVE for excessive daytime sleepiness, fatigue, and trouble remembering things in daytime  Hypersomnia / narcolepsy symptoms: Patient denies symptoms of a hypersomnolence disorder such as sleep paralysis, sleep-related hallucinations, and cataplexy.   RLS symptoms: Patient admits having urge to move legs that occurs at rest (sitting, getting into bed, or lying n bed), worse in the evening, and relieved temporarily with movement.   RLS symptoms occurring in daytime: Yes   RLS symptoms affect sleep onset: Yes   RLS symptoms wakes patient up from sleep: Yes   Movements in sleep: POSITIVE for frequent leg kicks / jerks at night while asleep  Parasomnia symptoms: Patient denies symptoms of parasomnia such as sleepwalking, sleeptalking, sleep-eating, acting out dreams, and nightmares.     WEIGHT: loss 24 lbs in 6 months    REVIEW OF SYSTEMS: All other systems have been reviewed and are negative.    PERTINENT  SOCIAL HISTORY:  Occupation: n/a  Smoking: No   ETOH: No   Marijuana: No   Caffeine: Yes, 1 cup of hot tea daily  Sleep aids: No   Claustrophobia: Yes     PERTINENT PAST SURGICAL HISTORY:  non-contributory    PERTINENT FAMILY HISTORY:  Patient denies family history of any sleep disorder.  Patient denies family history of sleep apnea.    Active Problems, Allergy List, Medication List, and PMH/PSH/FH/Social Hx have been reviewed and reconciled in chart. No significant changes unless documented in the pertinent chart section. Updates made when necessary.       Objective       11/5/2024     4:33 AM 11/5/2024     6:53 AM 11/5/2024    10:00 AM 11/5/2024    10:38 AM 11/5/2024     3:58 PM 11/5/2024     4:32 PM 11/6/2024    10:05 AM   Vitals   Systolic 169   182 172  196   Diastolic 73   107 77  68   Heart Rate 73 75 75 80 79 82 90   Temp 36.6 °C (97.9 °F) 36.5 °C (97.7 °F) 36.2 °C (97.2 °F) 36.7 °C (98.1 °F) 36.9 °C (98.4 °F)  36.5 °C (97.7 °F)   Resp 16   16 17     Weight (lb)       104   BMI       24.17 kg/m2   BSA (m2)       1.35 m2   Visit Report       Report        Vitals:    11/06/24 1023   BP:    Pulse:    Temp:    SpO2: 92%       REVIEW OF SYSTEMS  All other systems have been reviewed and are negative.    ALLERGIES  Allergies   Allergen Reactions    Iodine Hives, Itching and Unknown    Bee Pollen Unknown    Bioflavonoids Swelling    Citrus And Derivatives Unknown    Codeine Itching, Hives and Unknown     Tolerates percocet   Tolerates percocet    Tolerates percocet    Flowers Itching    Hydromorphone Itching    Shellfish Containing Products Swelling     SEAFOOD       MEDICATIONS  Current Outpatient Medications   Medication Sig Dispense Refill    albuterol (ProAir HFA) 90 mcg/actuation inhaler Inhale 2 puffs every 4 hours if needed for wheezing or shortness of breath. 18 g 5    albuterol 1.25 mg/3 mL nebulizer solution Take 3 mL (1.25 mg) by nebulization every 6 hours if needed for wheezing or shortness of breath. 90  mL 11    apixaban (Eliquis) 5 mg tablet Take 1 tablet (5 mg) by mouth 2 times a day. 60 tablet 0    aspirin 81 mg EC tablet Take 1 tablet (81 mg) by mouth once daily. 30 tablet 0    atorvastatin (Lipitor) 80 mg tablet Take 1 tablet (80 mg) by mouth once daily at bedtime. 30 tablet 0    calcium acetate (Phoslo) 667 mg capsule Take 2 capsules (1,334 mg) by mouth 3 times daily (morning, midday, late afternoon). 180 capsule 0    carvedilol (Coreg) 25 mg tablet Take 2 tablets (50 mg) by mouth 2 times a day. 120 tablet 0    [START ON 10/31/2024] cloNIDine (Catapres-TTS) 0.2 mg/24 hr patch Apply one patch on the skin and replace every 7 days, as directed. Do not start before October 31, 2024. 5 patch 1    epoetin joceline (Epogen,Procrit) 10,000 unit/mL injection Inject 1 mL (10,000 Units) under the skin 3 times a week.      fluticasone (Flonase) 50 mcg/actuation nasal spray Administer 2 sprays into each nostril once daily. Shake gently. Before first use, prime pump. After use, clean tip and replace cap. 16 g 12    fluticasone furoate-vilanteroL (Breo Ellipta) 100-25 mcg/dose inhaler Inhale 1 puff once daily. 60 each 11    gabapentin (Neurontin) 300 mg capsule Take 1 capsule (300 mg) by mouth once daily at bedtime. 30 capsule 1    hydrALAZINE (Apresoline) 100 mg tablet Take 1 tablet (100 mg) by mouth 3 times a day. 90 tablet 0    hydrOXYzine HCL (Atarax) 25 mg tablet Take 1 tablet (25 mg) by mouth every 6 hours if needed for anxiety, allergies or itching. 30 tablet 0    isosorbide mononitrate ER (Imdur) 120 mg 24 hr tablet Take 1 tablet (120 mg) by mouth once daily. Do not crush or chew. 30 tablet 0    melatonin 5 mg tablet Take 1 tablet (5 mg) by mouth once daily at bedtime. 30 tablet 1    NIFEdipine ER (Adalat CC) 90 mg 24 hr tablet Take 1 tablet (90 mg) by mouth once daily in the morning. Take before meals. Do not crush, chew, or split. 30 tablet 0    pantoprazole (ProtoNix) 40 mg EC tablet Take 1 tablet (40 mg) by mouth  once daily in the morning. Take before meals. Do not crush, chew, or split. 30 tablet 0    polyethylene glycol (Glycolax, Miralax) 17 gram/dose powder Take 17 g (1 scoop dissolved in liquid) by mouth once daily. Do not start before July 26, 2024. 510 g 0    SUMAtriptan (Imitrex) 25 mg tablet Take 1 tablet (25 mg) by mouth 1 time if needed for migraine. May repeat dose once in 2 hours if no relief.  Do not exceed 2 doses in 24 hours. 9 tablet 2    torsemide (Demadex) 20 mg tablet Take 2 tablets once daily on non-dialysis days only. Do not take on dialysis days 30 tablet 2    valsartan (Diovan) 320 mg tablet Take 1 tablet (320 mg) by mouth once daily in the evening.      vitamin B complex-vitamin C-folic acid (Nephrocaps) 1 mg capsule Take 1 capsule by mouth once daily. 30 capsule 0     No current facility-administered medications for this visit.         Physical Exam  Constitutional:alert and oriented to time, place, and person  Sinus: - tenderness to palpation  Palate:  Narrow Mallampati 2, - Tongue scalloping, - macroglossia  Lungs: Clear to auscultation bilateral, no rales  Heart: Regular rate and rhythm, no murmurs    Assessment/Plan   Stacey Heath is a 54 y.o. female who is seen to evaluate for possible obstructive sleep apnea. The pathophysiology of sleep apnea, diagnostic testing (HST vs PSG), treatment options (PAP, oral appliance, surgery, hypoglossal nerve stimulator called Inspire), and supportive management (weight loss, positional therapy, smoking cessation, avoidance of alcohol and sedatives) were discussed with the patient in detail. Risk factors of sleep apnea as well as cardiometabolic and neurocognitive sequelae associated with untreated sleep apnea were also discussed. Lastly, patient was advised to avoid driving vehicle or operating heavy machinery when sleepy.     Stacey Heath with the following problems:     # SLEEP DISORDERED BREATHING:  -This is likely sleep apnea based on the the history  and physical examination.   -Stacey Heath has not yet had a sleep study.  -Instruct patient to complete an in lab sleep study. 12/25-UPDATED : For medicare insurance requirements, change to Home Sleep Study.    -We consider treatment as indicated when testing is complete.     # CHRONIC SLEEP ONSET/ SLEEP MAINTENANCE INSOMNIA:  -likely due to poor sleep hygiene, and untreated sleep apnea.  -JUSTO: 23  -Sleep hygiene discuss in the clinic.    # ANXIETY:   -Staceyjesse Heathis taking hydrOXYzine HCL (Atarax) 25 mg tablet PRN and participating in psychotherapy.  -Denies HI/SI     # HYPERTENSION/ Congestive Heart Failure:   -Blood pressure was 196/68, recheck it at 193/70 today. To control the blood pressure better, instruct the patient to take anti-hypertension medication at bedtime and a water pill in the waking time.  -Denies headache, palpitation, and syncope in the clinic.  -Last Echo: Left ventricular systolic function is normal with a 60-65% estimated ejection fraction in April, 2024  -Follows with PCP/ Cardiology     # NASAL CONGESTION:  -Instruct Stacey Estes use appropriate Flonase spray to ease congestion.    # XEROSTOMIA:  -Instruct Stacey Estes purchase the Biotene gel to ease the dry mouth symptom,     # RESTLESS LEG SYNDROME: daily  -will reevaluate it after treatment  Iron   Date Value   01/15/2024 35 ug/dL   04/20/2023 CANCELED   08/04/2022 140 ug/dL   08/03/2022 72 ug/dL     Transferrin (mg/dL)   Date Value   01/16/2024 190 (L)   08/04/2022 230   08/03/2022 238     % Saturation (%)   Date Value   01/15/2024 17 (L)     Iron Saturation (%)   Date Value   08/04/2022 46 (H)   08/03/2022 23 (L)   06/29/2022 20 (L)     TIBC (ug/dL)   Date Value   01/15/2024 201 (L)   08/04/2022 304   08/03/2022 318   06/29/2022 302     Ferritin   Date Value   01/15/2024 1,044 ng/mL (H)   04/20/2023 CANCELED   08/04/2022 562 ug/L (H)   08/03/2022 709 ug/L (H)     RTC 2-3 weeks after sleep study-      All of patient's  questions were answered. She verbalizes understanding and agreement with my assessment and plan.

## 2024-10-30 ENCOUNTER — DOCUMENTATION (OUTPATIENT)
Dept: PSYCHIATRY | Facility: HOSPITAL | Age: 54
End: 2024-10-30

## 2024-10-30 ENCOUNTER — APPOINTMENT (OUTPATIENT)
Dept: CARE COORDINATION | Facility: CLINIC | Age: 54
End: 2024-10-30
Payer: COMMERCIAL

## 2024-11-01 ENCOUNTER — HOSPITAL ENCOUNTER (INPATIENT)
Facility: HOSPITAL | Age: 54
DRG: 304 | End: 2024-11-01
Attending: EMERGENCY MEDICINE | Admitting: STUDENT IN AN ORGANIZED HEALTH CARE EDUCATION/TRAINING PROGRAM
Payer: COMMERCIAL

## 2024-11-01 ENCOUNTER — CLINICAL SUPPORT (OUTPATIENT)
Dept: EMERGENCY MEDICINE | Facility: HOSPITAL | Age: 54
DRG: 304 | End: 2024-11-01
Payer: COMMERCIAL

## 2024-11-01 ENCOUNTER — APPOINTMENT (OUTPATIENT)
Dept: RADIOLOGY | Facility: HOSPITAL | Age: 54
DRG: 304 | End: 2024-11-01
Payer: COMMERCIAL

## 2024-11-01 ENCOUNTER — DOCUMENTATION (OUTPATIENT)
Dept: PSYCHIATRY | Facility: HOSPITAL | Age: 54
End: 2024-11-01
Payer: COMMERCIAL

## 2024-11-01 DIAGNOSIS — R11.2 NAUSEA AND VOMITING, UNSPECIFIED VOMITING TYPE: ICD-10-CM

## 2024-11-01 DIAGNOSIS — I16.1 HYPERTENSIVE EMERGENCY: Primary | ICD-10-CM

## 2024-11-01 DIAGNOSIS — J81.1 CHRONIC PULMONARY EDEMA: ICD-10-CM

## 2024-11-01 DIAGNOSIS — Z87.448 HISTORY OF END STAGE RENAL DISEASE: ICD-10-CM

## 2024-11-01 DIAGNOSIS — R00.2 PALPITATIONS: ICD-10-CM

## 2024-11-01 LAB
ALBUMIN SERPL BCP-MCNC: 4.3 G/DL (ref 3.4–5)
ALP SERPL-CCNC: 111 U/L (ref 33–110)
ALT SERPL W P-5'-P-CCNC: 14 U/L (ref 7–45)
ANION GAP BLDA CALCULATED.4IONS-SCNC: 14 MMO/L (ref 10–25)
ANION GAP BLDV CALCULATED.4IONS-SCNC: 16 MMOL/L (ref 10–25)
ANION GAP SERPL CALC-SCNC: 18 MMOL/L (ref 10–20)
AST SERPL W P-5'-P-CCNC: 15 U/L (ref 9–39)
ATRIAL RATE: 95 BPM
BASE EXCESS BLDA CALC-SCNC: 3.6 MMOL/L (ref -2–3)
BASE EXCESS BLDV CALC-SCNC: 6.6 MMOL/L (ref -2–3)
BASOPHILS # BLD AUTO: 0.12 X10*3/UL (ref 0–0.1)
BASOPHILS NFR BLD AUTO: 1.9 %
BILIRUB SERPL-MCNC: 0.5 MG/DL (ref 0–1.2)
BODY TEMPERATURE: 37 DEGREES CELSIUS
BODY TEMPERATURE: 37 DEGREES CELSIUS
BUN SERPL-MCNC: 35 MG/DL (ref 6–23)
CA-I BLDA-SCNC: 1.22 MMOL/L (ref 1.1–1.33)
CA-I BLDV-SCNC: 1.25 MMOL/L (ref 1.1–1.33)
CALCIUM SERPL-MCNC: 9.8 MG/DL (ref 8.6–10.6)
CARDIAC TROPONIN I PNL SERPL HS: 45 NG/L (ref 0–34)
CHLORIDE BLDA-SCNC: 99 MMOL/L (ref 98–107)
CHLORIDE BLDV-SCNC: 97 MMOL/L (ref 98–107)
CHLORIDE SERPL-SCNC: 97 MMOL/L (ref 98–107)
CO2 SERPL-SCNC: 30 MMOL/L (ref 21–32)
CREAT SERPL-MCNC: 8.24 MG/DL (ref 0.5–1.05)
EGFRCR SERPLBLD CKD-EPI 2021: 5 ML/MIN/1.73M*2
EOSINOPHIL # BLD AUTO: 0.59 X10*3/UL (ref 0–0.7)
EOSINOPHIL NFR BLD AUTO: 9.6 %
ERYTHROCYTE [DISTWIDTH] IN BLOOD BY AUTOMATED COUNT: 16 % (ref 11.5–14.5)
GLUCOSE BLDA-MCNC: 68 MG/DL (ref 74–99)
GLUCOSE BLDV-MCNC: 86 MG/DL (ref 74–99)
GLUCOSE SERPL-MCNC: 77 MG/DL (ref 74–99)
HCO3 BLDA-SCNC: 29.1 MMOL/L (ref 22–26)
HCO3 BLDV-SCNC: 32.3 MMOL/L (ref 22–26)
HCT VFR BLD AUTO: 29.6 % (ref 36–46)
HCT VFR BLD EST: 28 % (ref 36–46)
HCT VFR BLD EST: 30 % (ref 36–46)
HGB BLD-MCNC: 9.6 G/DL (ref 12–16)
HGB BLDA-MCNC: 9.2 G/DL (ref 12–16)
HGB BLDV-MCNC: 10 G/DL (ref 12–16)
IMM GRANULOCYTES # BLD AUTO: 0.01 X10*3/UL (ref 0–0.7)
IMM GRANULOCYTES NFR BLD AUTO: 0.2 % (ref 0–0.9)
INHALED O2 CONCENTRATION: 28 %
LACTATE BLDA-SCNC: 0.9 MMOL/L (ref 0.4–2)
LACTATE BLDV-SCNC: 1 MMOL/L (ref 0.4–2)
LYMPHOCYTES # BLD AUTO: 0.95 X10*3/UL (ref 1.2–4.8)
LYMPHOCYTES NFR BLD AUTO: 15.4 %
MCH RBC QN AUTO: 28.7 PG (ref 26–34)
MCHC RBC AUTO-ENTMCNC: 32.4 G/DL (ref 32–36)
MCV RBC AUTO: 89 FL (ref 80–100)
MONOCYTES # BLD AUTO: 0.45 X10*3/UL (ref 0.1–1)
MONOCYTES NFR BLD AUTO: 7.3 %
NEUTROPHILS # BLD AUTO: 4.05 X10*3/UL (ref 1.2–7.7)
NEUTROPHILS NFR BLD AUTO: 65.6 %
NRBC BLD-RTO: 0 /100 WBCS (ref 0–0)
OXYHGB MFR BLDA: 66.3 % (ref 94–98)
OXYHGB MFR BLDV: 43.7 % (ref 45–75)
P AXIS: 54 DEGREES
P OFFSET: 193 MS
P ONSET: 146 MS
PCO2 BLDA: 48 MM HG (ref 38–42)
PCO2 BLDV: 51 MM HG (ref 41–51)
PH BLDA: 7.39 PH (ref 7.38–7.42)
PH BLDV: 7.41 PH (ref 7.33–7.43)
PLATELET # BLD AUTO: 243 X10*3/UL (ref 150–450)
PO2 BLDA: 43 MM HG (ref 85–95)
PO2 BLDV: 30 MM HG (ref 35–45)
POTASSIUM BLDA-SCNC: 5.5 MMOL/L (ref 3.5–5.3)
POTASSIUM BLDV-SCNC: 5.8 MMOL/L (ref 3.5–5.3)
POTASSIUM SERPL-SCNC: 5.3 MMOL/L (ref 3.5–5.3)
PR INTERVAL: 144 MS
PROT SERPL-MCNC: 7.4 G/DL (ref 6.4–8.2)
Q ONSET: 218 MS
QRS COUNT: 16 BEATS
QRS DURATION: 84 MS
QT INTERVAL: 344 MS
QTC CALCULATION(BAZETT): 432 MS
QTC FREDERICIA: 400 MS
R AXIS: 29 DEGREES
RBC # BLD AUTO: 3.34 X10*6/UL (ref 4–5.2)
SAO2 % BLDA: 67 % (ref 94–100)
SAO2 % BLDV: 44 % (ref 45–75)
SODIUM BLDA-SCNC: 137 MMOL/L (ref 136–145)
SODIUM BLDV-SCNC: 139 MMOL/L (ref 136–145)
SODIUM SERPL-SCNC: 140 MMOL/L (ref 136–145)
T AXIS: 174 DEGREES
T OFFSET: 390 MS
VENTRICULAR RATE: 95 BPM
WBC # BLD AUTO: 6.2 X10*3/UL (ref 4.4–11.3)

## 2024-11-01 PROCEDURE — 71045 X-RAY EXAM CHEST 1 VIEW: CPT

## 2024-11-01 PROCEDURE — 71045 X-RAY EXAM CHEST 1 VIEW: CPT | Performed by: STUDENT IN AN ORGANIZED HEALTH CARE EDUCATION/TRAINING PROGRAM

## 2024-11-01 PROCEDURE — 93005 ELECTROCARDIOGRAM TRACING: CPT

## 2024-11-01 PROCEDURE — 99291 CRITICAL CARE FIRST HOUR: CPT

## 2024-11-01 PROCEDURE — 83880 ASSAY OF NATRIURETIC PEPTIDE: CPT

## 2024-11-01 PROCEDURE — 2500000004 HC RX 250 GENERAL PHARMACY W/ HCPCS (ALT 636 FOR OP/ED)

## 2024-11-01 PROCEDURE — 84132 ASSAY OF SERUM POTASSIUM: CPT

## 2024-11-01 PROCEDURE — 2500000004 HC RX 250 GENERAL PHARMACY W/ HCPCS (ALT 636 FOR OP/ED): Performed by: EMERGENCY MEDICINE

## 2024-11-01 PROCEDURE — 96374 THER/PROPH/DIAG INJ IV PUSH: CPT

## 2024-11-01 PROCEDURE — 36415 COLL VENOUS BLD VENIPUNCTURE: CPT

## 2024-11-01 PROCEDURE — 84484 ASSAY OF TROPONIN QUANT: CPT

## 2024-11-01 PROCEDURE — 2500000005 HC RX 250 GENERAL PHARMACY W/O HCPCS

## 2024-11-01 PROCEDURE — 85025 COMPLETE CBC W/AUTO DIFF WBC: CPT

## 2024-11-01 RX ORDER — HYDROMORPHONE HYDROCHLORIDE 1 MG/ML
INJECTION, SOLUTION INTRAMUSCULAR; INTRAVENOUS; SUBCUTANEOUS
Status: COMPLETED
Start: 2024-11-01 | End: 2024-11-02

## 2024-11-01 RX ORDER — HYDRALAZINE HYDROCHLORIDE 20 MG/ML
20 INJECTION INTRAMUSCULAR; INTRAVENOUS ONCE
Status: COMPLETED | OUTPATIENT
Start: 2024-11-01 | End: 2024-11-01

## 2024-11-01 RX ORDER — HYDRALAZINE HYDROCHLORIDE 20 MG/ML
INJECTION INTRAMUSCULAR; INTRAVENOUS
Status: DISPENSED
Start: 2024-11-01 | End: 2024-11-02

## 2024-11-01 RX ORDER — ONDANSETRON 4 MG/1
4 TABLET, ORALLY DISINTEGRATING ORAL ONCE
Status: COMPLETED | OUTPATIENT
Start: 2024-11-01 | End: 2024-11-01

## 2024-11-01 RX ORDER — ISOSORBIDE MONONITRATE 30 MG/1
120 TABLET, EXTENDED RELEASE ORAL ONCE
Status: COMPLETED | OUTPATIENT
Start: 2024-11-01 | End: 2024-11-02

## 2024-11-01 RX ORDER — HYDRALAZINE HYDROCHLORIDE 25 MG/1
100 TABLET, FILM COATED ORAL ONCE
Status: COMPLETED | OUTPATIENT
Start: 2024-11-01 | End: 2024-11-02

## 2024-11-01 RX ORDER — CARVEDILOL 12.5 MG/1
25 TABLET ORAL ONCE
Status: COMPLETED | OUTPATIENT
Start: 2024-11-01 | End: 2024-11-02

## 2024-11-01 RX ORDER — ONDANSETRON 4 MG/1
4 TABLET, ORALLY DISINTEGRATING ORAL ONCE
Status: DISCONTINUED | OUTPATIENT
Start: 2024-11-01 | End: 2024-11-01

## 2024-11-01 RX ADMIN — ONDANSETRON 4 MG: 4 TABLET, ORALLY DISINTEGRATING ORAL at 23:52

## 2024-11-01 RX ADMIN — HYDRALAZINE HYDROCHLORIDE 20 MG: 20 INJECTION INTRAMUSCULAR; INTRAVENOUS at 23:52

## 2024-11-01 ASSESSMENT — COLUMBIA-SUICIDE SEVERITY RATING SCALE - C-SSRS
4. HAVE YOU HAD THESE THOUGHTS AND HAD SOME INTENTION OF ACTING ON THEM?: NO
1. IN THE PAST MONTH, HAVE YOU WISHED YOU WERE DEAD OR WISHED YOU COULD GO TO SLEEP AND NOT WAKE UP?: NO
6. HAVE YOU EVER DONE ANYTHING, STARTED TO DO ANYTHING, OR PREPARED TO DO ANYTHING TO END YOUR LIFE?: NO
5. HAVE YOU STARTED TO WORK OUT OR WORKED OUT THE DETAILS OF HOW TO KILL YOURSELF? DO YOU INTEND TO CARRY OUT THIS PLAN?: NO
2. HAVE YOU ACTUALLY HAD ANY THOUGHTS OF KILLING YOURSELF?: NO

## 2024-11-01 ASSESSMENT — LIFESTYLE VARIABLES
HAVE PEOPLE ANNOYED YOU BY CRITICIZING YOUR DRINKING: NO
TOTAL SCORE: 0
HAVE YOU EVER FELT YOU SHOULD CUT DOWN ON YOUR DRINKING: NO
EVER FELT BAD OR GUILTY ABOUT YOUR DRINKING: NO
EVER HAD A DRINK FIRST THING IN THE MORNING TO STEADY YOUR NERVES TO GET RID OF A HANGOVER: NO

## 2024-11-02 ENCOUNTER — APPOINTMENT (OUTPATIENT)
Dept: RADIOLOGY | Facility: HOSPITAL | Age: 54
DRG: 304 | End: 2024-11-02
Payer: COMMERCIAL

## 2024-11-02 ENCOUNTER — APPOINTMENT (OUTPATIENT)
Dept: DIALYSIS | Facility: HOSPITAL | Age: 54
End: 2024-11-02
Payer: COMMERCIAL

## 2024-11-02 LAB
BNP SERPL-MCNC: 1679 PG/ML (ref 0–99)
CARDIAC TROPONIN I PNL SERPL HS: 42 NG/L (ref 0–34)
FLUAV RNA RESP QL NAA+PROBE: NOT DETECTED
FLUBV RNA RESP QL NAA+PROBE: NOT DETECTED
GLUCOSE BLD MANUAL STRIP-MCNC: 99 MG/DL (ref 74–99)
HOLD SPECIMEN: NORMAL
SARS-COV-2 RNA RESP QL NAA+PROBE: NOT DETECTED

## 2024-11-02 PROCEDURE — 2500000001 HC RX 250 WO HCPCS SELF ADMINISTERED DRUGS (ALT 637 FOR MEDICARE OP)

## 2024-11-02 PROCEDURE — 2500000001 HC RX 250 WO HCPCS SELF ADMINISTERED DRUGS (ALT 637 FOR MEDICARE OP): Performed by: STUDENT IN AN ORGANIZED HEALTH CARE EDUCATION/TRAINING PROGRAM

## 2024-11-02 PROCEDURE — 96375 TX/PRO/DX INJ NEW DRUG ADDON: CPT

## 2024-11-02 PROCEDURE — 82947 ASSAY GLUCOSE BLOOD QUANT: CPT

## 2024-11-02 PROCEDURE — 2500000004 HC RX 250 GENERAL PHARMACY W/ HCPCS (ALT 636 FOR OP/ED): Performed by: STUDENT IN AN ORGANIZED HEALTH CARE EDUCATION/TRAINING PROGRAM

## 2024-11-02 PROCEDURE — 1200000002 HC GENERAL ROOM WITH TELEMETRY DAILY

## 2024-11-02 PROCEDURE — 74018 RADEX ABDOMEN 1 VIEW: CPT

## 2024-11-02 PROCEDURE — 2500000004 HC RX 250 GENERAL PHARMACY W/ HCPCS (ALT 636 FOR OP/ED)

## 2024-11-02 PROCEDURE — 36415 COLL VENOUS BLD VENIPUNCTURE: CPT

## 2024-11-02 PROCEDURE — 84484 ASSAY OF TROPONIN QUANT: CPT

## 2024-11-02 PROCEDURE — 2500000002 HC RX 250 W HCPCS SELF ADMINISTERED DRUGS (ALT 637 FOR MEDICARE OP, ALT 636 FOR OP/ED): Performed by: STUDENT IN AN ORGANIZED HEALTH CARE EDUCATION/TRAINING PROGRAM

## 2024-11-02 PROCEDURE — 99221 1ST HOSP IP/OBS SF/LOW 40: CPT | Performed by: INTERNAL MEDICINE

## 2024-11-02 PROCEDURE — 99223 1ST HOSP IP/OBS HIGH 75: CPT | Performed by: STUDENT IN AN ORGANIZED HEALTH CARE EDUCATION/TRAINING PROGRAM

## 2024-11-02 PROCEDURE — 90935 HEMODIALYSIS ONE EVALUATION: CPT | Performed by: INTERNAL MEDICINE

## 2024-11-02 PROCEDURE — 74018 RADEX ABDOMEN 1 VIEW: CPT | Performed by: RADIOLOGY

## 2024-11-02 PROCEDURE — 96374 THER/PROPH/DIAG INJ IV PUSH: CPT

## 2024-11-02 PROCEDURE — 87636 SARSCOV2 & INF A&B AMP PRB: CPT

## 2024-11-02 RX ORDER — HYDROXYZINE HYDROCHLORIDE 25 MG/1
25 TABLET, FILM COATED ORAL EVERY 6 HOURS PRN
Status: DISCONTINUED | OUTPATIENT
Start: 2024-11-02 | End: 2024-11-02

## 2024-11-02 RX ORDER — ASPIRIN 81 MG/1
81 TABLET ORAL DAILY
Status: DISCONTINUED | OUTPATIENT
Start: 2024-11-02 | End: 2024-11-05 | Stop reason: HOSPADM

## 2024-11-02 RX ORDER — NIFEDIPINE 90 MG/1
90 TABLET, EXTENDED RELEASE ORAL
Status: DISCONTINUED | OUTPATIENT
Start: 2024-11-02 | End: 2024-11-05 | Stop reason: HOSPADM

## 2024-11-02 RX ORDER — HYDROMORPHONE HYDROCHLORIDE 1 MG/ML
1 INJECTION, SOLUTION INTRAMUSCULAR; INTRAVENOUS; SUBCUTANEOUS ONCE
Status: COMPLETED | OUTPATIENT
Start: 2024-11-02 | End: 2024-11-02

## 2024-11-02 RX ORDER — POLYETHYLENE GLYCOL 3350 17 G/17G
17 POWDER, FOR SOLUTION ORAL DAILY
Status: DISCONTINUED | OUTPATIENT
Start: 2024-11-02 | End: 2024-11-05 | Stop reason: HOSPADM

## 2024-11-02 RX ORDER — TORSEMIDE 20 MG/1
20 TABLET ORAL DAILY
Status: DISCONTINUED | OUTPATIENT
Start: 2024-11-02 | End: 2024-11-05 | Stop reason: HOSPADM

## 2024-11-02 RX ORDER — VALSARTAN 160 MG/1
320 TABLET ORAL ONCE
Status: COMPLETED | OUTPATIENT
Start: 2024-11-02 | End: 2024-11-02

## 2024-11-02 RX ORDER — PROCHLORPERAZINE EDISYLATE 5 MG/ML
10 INJECTION INTRAMUSCULAR; INTRAVENOUS EVERY 6 HOURS PRN
Status: DISCONTINUED | OUTPATIENT
Start: 2024-11-02 | End: 2024-11-05 | Stop reason: HOSPADM

## 2024-11-02 RX ORDER — VALSARTAN 320 MG/1
320 TABLET ORAL EVERY EVENING
Status: DISCONTINUED | OUTPATIENT
Start: 2024-11-03 | End: 2024-11-05 | Stop reason: HOSPADM

## 2024-11-02 RX ORDER — ACETAMINOPHEN 500 MG
5 TABLET ORAL NIGHTLY
Status: DISCONTINUED | OUTPATIENT
Start: 2024-11-02 | End: 2024-11-05 | Stop reason: HOSPADM

## 2024-11-02 RX ORDER — GABAPENTIN 300 MG/1
300 CAPSULE ORAL NIGHTLY
Status: DISCONTINUED | OUTPATIENT
Start: 2024-11-02 | End: 2024-11-05 | Stop reason: HOSPADM

## 2024-11-02 RX ORDER — METOCLOPRAMIDE HYDROCHLORIDE 5 MG/ML
INJECTION INTRAMUSCULAR; INTRAVENOUS
Status: COMPLETED
Start: 2024-11-02 | End: 2024-11-02

## 2024-11-02 RX ORDER — ACETAMINOPHEN 325 MG/1
650 TABLET ORAL EVERY 4 HOURS PRN
Status: DISCONTINUED | OUTPATIENT
Start: 2024-11-02 | End: 2024-11-03

## 2024-11-02 RX ORDER — ALBUTEROL SULFATE 90 UG/1
2 INHALANT RESPIRATORY (INHALATION) EVERY 6 HOURS PRN
Status: DISCONTINUED | OUTPATIENT
Start: 2024-11-02 | End: 2024-11-05 | Stop reason: HOSPADM

## 2024-11-02 RX ORDER — METOCLOPRAMIDE HYDROCHLORIDE 5 MG/ML
10 INJECTION INTRAMUSCULAR; INTRAVENOUS ONCE
Status: COMPLETED | OUTPATIENT
Start: 2024-11-02 | End: 2024-11-02

## 2024-11-02 RX ORDER — FLUTICASONE PROPIONATE 50 MCG
2 SPRAY, SUSPENSION (ML) NASAL DAILY
Status: DISCONTINUED | OUTPATIENT
Start: 2024-11-02 | End: 2024-11-05 | Stop reason: HOSPADM

## 2024-11-02 RX ORDER — FLUTICASONE FUROATE AND VILANTEROL 100; 25 UG/1; UG/1
1 POWDER RESPIRATORY (INHALATION)
Status: DISCONTINUED | OUTPATIENT
Start: 2024-11-02 | End: 2024-11-05 | Stop reason: HOSPADM

## 2024-11-02 RX ORDER — ONDANSETRON 4 MG/1
4 TABLET, FILM COATED ORAL EVERY 8 HOURS PRN
Status: DISCONTINUED | OUTPATIENT
Start: 2024-11-02 | End: 2024-11-05 | Stop reason: HOSPADM

## 2024-11-02 RX ORDER — DIPHENHYDRAMINE HCL 25 MG
25 CAPSULE ORAL EVERY 6 HOURS PRN
Status: DISCONTINUED | OUTPATIENT
Start: 2024-11-02 | End: 2024-11-05 | Stop reason: HOSPADM

## 2024-11-02 RX ORDER — CALCIUM ACETATE 667 MG/1
1334 CAPSULE ORAL
Status: DISCONTINUED | OUTPATIENT
Start: 2024-11-02 | End: 2024-11-05 | Stop reason: HOSPADM

## 2024-11-02 RX ORDER — AMOXICILLIN 250 MG
2 CAPSULE ORAL 2 TIMES DAILY
Status: DISCONTINUED | OUTPATIENT
Start: 2024-11-02 | End: 2024-11-05 | Stop reason: HOSPADM

## 2024-11-02 RX ORDER — DIPHENHYDRAMINE HYDROCHLORIDE 50 MG/ML
25 INJECTION INTRAMUSCULAR; INTRAVENOUS ONCE
Status: COMPLETED | OUTPATIENT
Start: 2024-11-02 | End: 2024-11-02

## 2024-11-02 RX ORDER — VALSARTAN 160 MG/1
320 TABLET ORAL EVERY EVENING
Status: DISCONTINUED | OUTPATIENT
Start: 2024-11-02 | End: 2024-11-02

## 2024-11-02 RX ORDER — HYDRALAZINE HYDROCHLORIDE 50 MG/1
100 TABLET, FILM COATED ORAL 3 TIMES DAILY
Status: DISCONTINUED | OUTPATIENT
Start: 2024-11-02 | End: 2024-11-05

## 2024-11-02 RX ORDER — PANTOPRAZOLE SODIUM 40 MG/1
40 TABLET, DELAYED RELEASE ORAL
Status: DISCONTINUED | OUTPATIENT
Start: 2024-11-02 | End: 2024-11-05 | Stop reason: HOSPADM

## 2024-11-02 RX ORDER — CARVEDILOL 12.5 MG/1
50 TABLET ORAL 2 TIMES DAILY
Status: DISCONTINUED | OUTPATIENT
Start: 2024-11-02 | End: 2024-11-05 | Stop reason: HOSPADM

## 2024-11-02 RX ORDER — HYDRALAZINE HYDROCHLORIDE 20 MG/ML
10 INJECTION INTRAMUSCULAR; INTRAVENOUS EVERY 8 HOURS PRN
Status: DISCONTINUED | OUTPATIENT
Start: 2024-11-02 | End: 2024-11-05 | Stop reason: HOSPADM

## 2024-11-02 RX ORDER — ACETAMINOPHEN 160 MG/5ML
650 SOLUTION ORAL EVERY 4 HOURS PRN
Status: DISCONTINUED | OUTPATIENT
Start: 2024-11-02 | End: 2024-11-03

## 2024-11-02 RX ORDER — CLONIDINE 0.2 MG/24H
1 PATCH, EXTENDED RELEASE TRANSDERMAL WEEKLY
Status: DISCONTINUED | OUTPATIENT
Start: 2024-11-02 | End: 2024-11-05 | Stop reason: HOSPADM

## 2024-11-02 RX ORDER — ISOSORBIDE MONONITRATE 30 MG/1
120 TABLET, EXTENDED RELEASE ORAL DAILY
Status: DISCONTINUED | OUTPATIENT
Start: 2024-11-03 | End: 2024-11-05 | Stop reason: HOSPADM

## 2024-11-02 RX ORDER — PROCHLORPERAZINE MALEATE 10 MG
10 TABLET ORAL EVERY 6 HOURS PRN
Status: DISCONTINUED | OUTPATIENT
Start: 2024-11-02 | End: 2024-11-05 | Stop reason: HOSPADM

## 2024-11-02 RX ORDER — ONDANSETRON HYDROCHLORIDE 2 MG/ML
4 INJECTION, SOLUTION INTRAVENOUS ONCE
Status: COMPLETED | OUTPATIENT
Start: 2024-11-02 | End: 2024-11-02

## 2024-11-02 RX ORDER — BENZONATATE 100 MG/1
100 CAPSULE ORAL 3 TIMES DAILY PRN
Status: DISCONTINUED | OUTPATIENT
Start: 2024-11-02 | End: 2024-11-03

## 2024-11-02 RX ORDER — NITROGLYCERIN 20 MG/100ML
0-200 INJECTION INTRAVENOUS CONTINUOUS
Status: DISCONTINUED | OUTPATIENT
Start: 2024-11-02 | End: 2024-11-02

## 2024-11-02 RX ORDER — PROCHLORPERAZINE 25 MG/1
25 SUPPOSITORY RECTAL EVERY 12 HOURS PRN
Status: DISCONTINUED | OUTPATIENT
Start: 2024-11-02 | End: 2024-11-05 | Stop reason: HOSPADM

## 2024-11-02 RX ORDER — TRAMADOL HYDROCHLORIDE 50 MG/1
50 TABLET ORAL EVERY 12 HOURS PRN
Status: DISPENSED | OUTPATIENT
Start: 2024-11-02 | End: 2024-11-04

## 2024-11-02 RX ORDER — KETOROLAC TROMETHAMINE 15 MG/ML
15 INJECTION, SOLUTION INTRAMUSCULAR; INTRAVENOUS ONCE
Status: COMPLETED | OUTPATIENT
Start: 2024-11-02 | End: 2024-11-02

## 2024-11-02 RX ORDER — ATORVASTATIN CALCIUM 80 MG/1
80 TABLET, FILM COATED ORAL NIGHTLY
Status: DISCONTINUED | OUTPATIENT
Start: 2024-11-02 | End: 2024-11-05 | Stop reason: HOSPADM

## 2024-11-02 RX ORDER — ACETAMINOPHEN 650 MG/1
650 SUPPOSITORY RECTAL EVERY 4 HOURS PRN
Status: DISCONTINUED | OUTPATIENT
Start: 2024-11-02 | End: 2024-11-03

## 2024-11-02 RX ORDER — ONDANSETRON HYDROCHLORIDE 2 MG/ML
4 INJECTION, SOLUTION INTRAVENOUS EVERY 8 HOURS PRN
Status: DISCONTINUED | OUTPATIENT
Start: 2024-11-02 | End: 2024-11-05 | Stop reason: HOSPADM

## 2024-11-02 RX ADMIN — HYDROMORPHONE HYDROCHLORIDE 1 MG: 1 INJECTION, SOLUTION INTRAMUSCULAR; INTRAVENOUS; SUBCUTANEOUS at 00:12

## 2024-11-02 RX ADMIN — KETOROLAC TROMETHAMINE 15 MG: 15 INJECTION, SOLUTION INTRAMUSCULAR; INTRAVENOUS at 03:24

## 2024-11-02 RX ADMIN — ASPIRIN 81 MG: 81 TABLET, COATED ORAL at 08:23

## 2024-11-02 RX ADMIN — CARVEDILOL 50 MG: 12.5 TABLET, FILM COATED ORAL at 20:56

## 2024-11-02 RX ADMIN — ISOSORBIDE MONONITRATE 120 MG: 30 TABLET, EXTENDED RELEASE ORAL at 05:01

## 2024-11-02 RX ADMIN — CALCIUM ACETATE 1334 MG: 667 CAPSULE ORAL at 18:50

## 2024-11-02 RX ADMIN — Medication 5 MG: at 20:56

## 2024-11-02 RX ADMIN — APIXABAN 5 MG: 5 TABLET, FILM COATED ORAL at 08:23

## 2024-11-02 RX ADMIN — PROCHLORPERAZINE EDISYLATE 10 MG: 5 INJECTION INTRAMUSCULAR; INTRAVENOUS at 20:57

## 2024-11-02 RX ADMIN — BENZONATATE 100 MG: 100 CAPSULE ORAL at 21:10

## 2024-11-02 RX ADMIN — APIXABAN 5 MG: 5 TABLET, FILM COATED ORAL at 20:56

## 2024-11-02 RX ADMIN — HYDRALAZINE HYDROCHLORIDE 100 MG: 50 TABLET ORAL at 15:32

## 2024-11-02 RX ADMIN — CARVEDILOL 25 MG: 12.5 TABLET, FILM COATED ORAL at 05:01

## 2024-11-02 RX ADMIN — DIPHENHYDRAMINE HYDROCHLORIDE 25 MG: 50 INJECTION INTRAMUSCULAR; INTRAVENOUS at 03:24

## 2024-11-02 RX ADMIN — CALCIUM ACETATE 1334 MG: 667 CAPSULE ORAL at 15:32

## 2024-11-02 RX ADMIN — ONDANSETRON 4 MG: 2 INJECTION INTRAMUSCULAR; INTRAVENOUS at 03:12

## 2024-11-02 RX ADMIN — VALSARTAN 320 MG: 160 TABLET, FILM COATED ORAL at 10:23

## 2024-11-02 RX ADMIN — ATORVASTATIN CALCIUM 80 MG: 80 TABLET, FILM COATED ORAL at 20:56

## 2024-11-02 RX ADMIN — METOCLOPRAMIDE 10 MG: 5 INJECTION, SOLUTION INTRAMUSCULAR; INTRAVENOUS at 00:38

## 2024-11-02 RX ADMIN — HYDRALAZINE HYDROCHLORIDE 100 MG: 25 TABLET ORAL at 05:01

## 2024-11-02 RX ADMIN — METOCLOPRAMIDE HYDROCHLORIDE 10 MG: 5 INJECTION INTRAMUSCULAR; INTRAVENOUS at 00:38

## 2024-11-02 RX ADMIN — RENO CAPS 1 CAPSULE: 100; 1.5; 1.7; 20; 10; 1; 150; 5; 6 CAPSULE ORAL at 08:23

## 2024-11-02 RX ADMIN — HYDRALAZINE HYDROCHLORIDE 10 MG: 20 INJECTION INTRAMUSCULAR; INTRAVENOUS at 10:15

## 2024-11-02 RX ADMIN — FLUTICASONE FUROATE AND VILANTEROL TRIFENATATE 1 PUFF: 100; 25 POWDER RESPIRATORY (INHALATION) at 06:39

## 2024-11-02 RX ADMIN — NIFEDIPINE 90 MG: 90 TABLET, FILM COATED, EXTENDED RELEASE ORAL at 06:39

## 2024-11-02 RX ADMIN — TORSEMIDE 20 MG: 20 TABLET ORAL at 08:23

## 2024-11-02 RX ADMIN — TRAMADOL HYDROCHLORIDE 50 MG: 50 TABLET, COATED ORAL at 14:25

## 2024-11-02 RX ADMIN — HYDRALAZINE HYDROCHLORIDE 100 MG: 50 TABLET ORAL at 20:56

## 2024-11-02 RX ADMIN — ACETAMINOPHEN 650 MG: 325 TABLET ORAL at 21:09

## 2024-11-02 RX ADMIN — PANTOPRAZOLE SODIUM 40 MG: 40 TABLET, DELAYED RELEASE ORAL at 06:39

## 2024-11-02 RX ADMIN — DIPHENHYDRAMINE HYDROCHLORIDE 25 MG: 25 CAPSULE ORAL at 20:57

## 2024-11-02 RX ADMIN — ONDANSETRON 4 MG: 2 INJECTION INTRAMUSCULAR; INTRAVENOUS at 14:00

## 2024-11-02 RX ADMIN — CALCIUM ACETATE 1334 MG: 667 CAPSULE ORAL at 08:23

## 2024-11-02 RX ADMIN — ACETAMINOPHEN 650 MG: 325 TABLET ORAL at 15:32

## 2024-11-02 RX ADMIN — GABAPENTIN 300 MG: 300 CAPSULE ORAL at 20:56

## 2024-11-02 ASSESSMENT — PAIN SCALES - GENERAL
PAINLEVEL_OUTOF10: 0 - NO PAIN
PAINLEVEL_OUTOF10: 7
PAINLEVEL_OUTOF10: 0 - NO PAIN
PAINLEVEL_OUTOF10: 7

## 2024-11-02 ASSESSMENT — PAIN - FUNCTIONAL ASSESSMENT
PAIN_FUNCTIONAL_ASSESSMENT: 0-10
PAIN_FUNCTIONAL_ASSESSMENT: 0-10

## 2024-11-02 ASSESSMENT — PAIN DESCRIPTION - LOCATION: LOCATION: HAND

## 2024-11-02 ASSESSMENT — PAIN DESCRIPTION - ORIENTATION: ORIENTATION: RIGHT

## 2024-11-02 NOTE — PROGRESS NOTES
Pharmacy Medication History Review    Stacey Heath is a 54 y.o. female admitted for Hypertensive emergency. Pharmacy reviewed the patient's [id number] medications and allergies for accuracy.    Medications ADDED:  none  Medications CHANGED:  none  Medications REMOVED:   none     The list below reflects the updated PTA list.   Prior to Admission Medications   Prescriptions Last Dose Informant   NIFEdipine ER (Adalat CC) 90 mg 24 hr tablet 11/1/2024 Self   Sig: Take 1 tablet (90 mg) by mouth once daily in the morning. Take before meals. Do not crush, chew, or split.   SUMAtriptan (Imitrex) 25 mg tablet Past Month Self   Sig: Take 1 tablet (25 mg) by mouth 1 time if needed for migraine. May repeat dose once in 2 hours if no relief.  Do not exceed 2 doses in 24 hours.   albuterol (ProAir HFA) 90 mcg/actuation inhaler 11/1/2024 Self   Sig: Inhale 2 puffs every 4 hours if needed for wheezing or shortness of breath.   albuterol 1.25 mg/3 mL nebulizer solution 11/1/2024 Self   Sig: Take 3 mL (1.25 mg) by nebulization every 6 hours if needed for wheezing or shortness of breath.   apixaban (Eliquis) 5 mg tablet 11/1/2024 Self   Sig: Take 1 tablet (5 mg) by mouth 2 times a day.   aspirin 81 mg EC tablet 11/1/2024 Morning Self   Sig: Take 1 tablet (81 mg) by mouth once daily.   atorvastatin (Lipitor) 80 mg tablet 11/1/2024 Self   Sig: Take 1 tablet (80 mg) by mouth once daily at bedtime.   calcium acetate (Phoslo) 667 mg capsule 11/1/2024 Self   Sig: Take 2 capsules (1,334 mg) by mouth 3 times daily (morning, midday, late afternoon).   carvedilol (Coreg) 25 mg tablet 11/1/2024 Self   Sig: Take 2 tablets (50 mg) by mouth 2 times a day.   cloNIDine (Catapres-TTS) 0.2 mg/24 hr patch 11/1/2024 Self   Sig: Apply one patch on the skin and replace every 7 days, as directed. Do not start before October 31, 2024.   epoetin joceline (Epogen,Procrit) 10,000 unit/mL injection Past Month Self   Sig: Inject 1 mL (10,000 Units) under  the skin 3 times a week.   fluticasone (Flonase) 50 mcg/actuation nasal spray 11/1/2024 Self   Sig: Administer 2 sprays into each nostril once daily. Shake gently. Before first use, prime pump. After use, clean tip and replace cap.   fluticasone furoate-vilanteroL (Breo Ellipta) 100-25 mcg/dose inhaler 11/1/2024 Self   Sig: Inhale 1 puff once daily.   gabapentin (Neurontin) 300 mg capsule 11/1/2024 Self   Sig: Take 1 capsule (300 mg) by mouth once daily at bedtime.   hydrALAZINE (Apresoline) 100 mg tablet 11/1/2024 Self   Sig: Take 1 tablet (100 mg) by mouth 3 times a day.   hydrOXYzine HCL (Atarax) 25 mg tablet 11/1/2024 Self   Sig: Take 1 tablet (25 mg) by mouth every 6 hours if needed for anxiety, allergies or itching.   isosorbide mononitrate ER (Imdur) 120 mg 24 hr tablet 11/1/2024 Self   Sig: Take 1 tablet (120 mg) by mouth once daily. Do not crush or chew.   melatonin 5 mg tablet 11/1/2024 Self   Sig: Take 1 tablet (5 mg) by mouth once daily at bedtime.   pantoprazole (ProtoNix) 40 mg EC tablet 11/1/2024 Self   Sig: Take 1 tablet (40 mg) by mouth once daily in the morning. Take before meals. Do not crush, chew, or split.   polyethylene glycol (Glycolax, Miralax) 17 gram/dose powder 11/1/2024 Self   Sig: Take 17 g (1 scoop dissolved in liquid) by mouth once daily. Do not start before July 26, 2024.   torsemide (Demadex) 20 mg tablet 11/1/2024 Self   Sig: Take 2 tablets once daily on non-dialysis days only. Do not take on dialysis days   valsartan (Diovan) 320 mg tablet 11/1/2024 Self   Sig: Take 1 tablet (320 mg) by mouth once daily in the evening.   vitamin B complex-vitamin C-folic acid (Nephrocaps) 1 mg capsule 11/1/2024 Self   Sig: Take 1 capsule by mouth once daily.      Facility-Administered Medications: None        The list below reflects the updated allergy list. Please review each documented allergy for additional clarification and justification.  Allergies  Reviewed by Mesfin Kong RN on 11/1/2024  "       Severity Reactions Comments    Iodine High Hives, Itching, Unknown     Bee Pollen Not Specified Unknown     Bioflavonoids Not Specified Swelling     Citrus And Derivatives Not Specified Unknown     Codeine Not Specified Itching, Hives, Unknown Tolerates percocet   Tolerates percocet Tolerates percocet    Flowers Not Specified Itching     Shellfish Containing Products Not Specified Swelling SEAFOOD            Patient accepts M2B at discharge.     Sources:   [facility]  Pharmacy dispense history  Patient interview Good historian  Chart Review  Care Everywhere    Additional Comments:  none      Polo Burgos [facility]  Transitions of Care Pharmacist  11/02/24     Secure Chat preferred   If no response call [phone] or Vocera \"Med Rec\"    "

## 2024-11-02 NOTE — ED PROVIDER NOTES
Emergency Department Provider Note        History of Present Illness     History provided by: Patient  Limitations to History: None  External Records Reviewed with Brief Summary: Discharge Summary from 10/24/2024 which showed recent admission for flash pulmonary edema    HPI:  Stacey Heath is a 54 y.o. female with PMHx HTN, HLD, DM, CAD, asthma/COPD, ESRD on T/Th/Sa HD, CHF who is presenting with shortness of breath and nausea/vomiting starting this morning.  She reports multiple episodes of clear, nonbloody nonbilious emesis, along with shortness of breath.  States that the shortness of breath feels like when she is previously been admitted for fluid overload.  Has also developed low abdominal pain.  Reports no headache, chest pain, diarrhea/constipation, urinary symptoms.  Does still make some urine.  Additionally patient states that she had a full run of dialysis yesterday with 2.5L fluid removed.    Reports previous surgical history of cholecystectomy, appendectomy, tubal ligation.    Patient later noted that she had been drinking alcohol today and that is why she became nauseated, however became concerned that she was unable to keep down any of her blood pressure medications that she took this morning.  Clonidine patch is in place, was placed on 10/31.      Physical Exam   Triage vitals:  T 36.4 °C (97.5 °F)  HR 99  BP (!) 240/94  RR 20  O2 94 % None (Room air)    Physical exam:   Triage vitals reviewed.  Constitutional: Well developed adult in no acute distress, non toxic in appearance  Head: Normocephalic, atraumatic  Skin: Intact, dry. No rashes or lesions.  Eyes: Pupils are equal. No conjunctival injection.  Neck: Supple. Trachea is midline.  Pulmonary: Increased work of breathing with no accessory muscle use noted.  Rhonchorous breath sounds bilaterally.  Cardiovascular: Normal rate, regular rhythm. No murmurs/gallops/rubs appreciated. 2+ radial and DP pulses bilaterally.   Abdomen: Soft,  nondistended.  Nontender to palpation, no guarding or rebound.  Extremities: No gross deformities.  Moving all extremities spontaneously without difficulty.  RUE AV fistula with palpable thrill.  Neuro: Awake and alert. Face is symmetric. Speech is clear. No obvious focal findings.  Psych: Appropriate mood and affect.      Medical Decision Making & ED Course   Medical Decision Makin y.o. female with PMHx HTN, HLD, DM, CAD, asthma/COPD, ESRD on T//Sa HD, CHF who is presenting with shortness of breath and severe hypertension.  Patient notes she did try to take her home blood pressure medications today, however had nausea/vomiting following this.  On arrival, she was afebrile, hypertensive to 240/94, HR 99, SpO2 94% on 2L NC.  Exam was significant for rhonchorous breath sounds bilaterally, no LE edema.     POCUS on arrival significant for B-lines, L>R and small pleural effusion on the left.    Significant difficulty obtaining IV access for patient.  Ultrasound-guided IVs were attempted by myself and another resident, as well as ultrasound trained medic.  Were able to obtain blood for labs, however were unable to place a line at that time.  Additionally another medic attempted EJ access, however was unsuccessful as patient is unable to tolerate lying flat secondary to shortness of breath.     Initial VBG with normal pH, pCO2, elevated potassium of 5.8, and normal lactate. CXR significant for pulmonary edema and trace bilateral pleural effusions.  EKG significant for hyperacute T waves.    Given her severe hypertension on arrival, there is concern for hypertensive emergency.  This is most likely in the setting of her PO intolerance following alcohol use today.  CXR and POCUS also concerning for possible pulmonary edema, which raises concern for flash pulmonary edema in the setting patient's hypertension.  Nausea/vomiting may be related to gastritis versus pancreatitis, however has no epigastric tenderness to  palpation.  No peritoneal signs, so no low concern for acute abdomen.    Initial plan was for 20 mg hydralazine to be given IV once access was established, however given difficulty with obtaining IV access, patient's PO antihypertensives were ordered, as well as Zofran ODT.    Signed out to oncoming provider pending completion of workup, however anticipate she will require admission given inability to tolerate PO antihypertensives at this time.    ----    Differential diagnoses considered include but are not limited to: Hypertensive emergency, flash pulmonary edema, pulmonary edema, pneumonia, hyperkalemia, pancreatitis, gastritis     Social Determinants of Health which Significantly Impact Care: None identified     EKG Independent Interpretation: EKG interpreted by myself. Please see ED Course for full interpretation.    Independent Result Review and Interpretation: Relevant laboratory and radiographic results were reviewed and independently interpreted by myself.  As necessary, they are commented on in the ED Course.    Chronic conditions affecting the patient's care: As documented above in [facility]    The patient was discussed with the following consultants/services: None    Care Considerations: As documented above in [facility]    ED Course:  ED Course as of 11/02/24 0042 Fri Nov 01, 2024 2212 54-year-old female multiple medical problems including hypertension hyperlipidemia end-stage renal disease on dialysis Tuesday Thursday Saturday asthma COPD presents with nausea vomiting and shortness of breath.  Patient states that today he developed nausea which prevented her keeping down her blood pressure medications.  She had a full run of dialysis yesterday.  She also states that she drank alcohol earlier today which likely prompted her nausea and vomiting.  Patient has had previous ED visits for flash pulmonary edema.  On physical exam patient is hypertensive to 250/110.  She appears in mild respiratory distress but is  nontoxic.  She has rhonchi me crackles greater on the left than the right.  She has normal cardiac.  Point-of-care ultrasound shows asymmetric B-lines on the left versus the right.  She has a benign abdomen.  She has no lower extremity edema.  She has a right upper extremity fistula with a palpable thrill.  Patient with history of end-stage renal disease on dialysis with full run of dialysis yesterday presents with hypertension after nausea and vomiting prevented tolerance of her p.o. antihypertensives.  The nausea and vomiting may be secondary to gastritis given her alcohol intake pancreatitis is also a possibility.  Patient has a benign abdomen so a surgical emergency seems unlikely.  Patient's hypertension may be secondary to her discomfort as well as her medication intolerance.  We will start parenteral antihypertensives.  Patient with asymmetric B-lines there is concern for possible pneumonia versus pulmonary edema.  Will get a chest x-ray.  Patient likely will require inpatient admission given her profound hypertension and her intolerance of home antihypertension medications. [CM]   2230 ECG 12 lead  EKG interpreted by me (ED resident): Rate 95 bpm, normal sinus rhythm with a normal axis.  , normal QTc.  Hyperacute T waves present in precordial leads.  T wave inversions present in lead I, II, aVL, aVF, and V6 which are seen on previous EKG.  When compared with prior EKG from 10/21/2024, hyperacute T waves are now present, no other significant changes. [HH]   2335 XR chest 1 view  Chest x-ray personally interpreted by me showing pulmonary edema and small left-sided pleural effusion but no evidence of focal pneumonia, pneumothorax or other concerning findings. [RS]   2342 Blood Gas Arterial Full Panel Unsolicited(!!)  Repeat VBG use for ultrasound line confirmation which does confirm that this was venous blood drawn. [RS]      ED Course User Index  [CM] Geoff Gleason MD  [HH] Ellen Gauthier MD  [RS]  Steve Majano, DO     Disposition   Patient was signed out to Dr. Majano at 2300 pending completion of their work-up.  Please see the next provider's transition of care note for the remainder of the patient's care.       Patient seen and discussed with ED attending physician.    Ellen Gauthier MD  Emergency Medicine     Ellen Gauthier MD  Resident  11/02/24 0042

## 2024-11-02 NOTE — ED TRIAGE NOTES
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 Of note: Pt was just admitted for acute hypoxic respiratory failure and hypertensive emergency 2/2 hypervolemia. Pt reports adherence to hd and meds, fluid restriction so perhaps poorly controlled bp and recent stressors are exacerbating factors. Allergies listed in EMR     Initial vitals remarkable for hypertension to 239/96, tachycardia to 104 as well as hypoxia placed on nonrebreather mask.  Remarkable for troponin elevation to 41 stable on recheck to 38.  BNP also significantly elevated to 2440.  Chest x-ray showed interstitial edema with small left pleural effusion.  Patient started on nitroglycerin drip as well as BiPAP.  Home blood pressure medications were resumed and was able to be weaned to 2 L nasal cannula.  Symptoms were likely due to flash pulmonary edema in setting of hypertensive emergency secondary to medication noncompliance.           DISCHARGE Summary from 10.24.24  Discharge Diagnosis  Flash pulmonary edema     Issues Requiring Follow-Up  Hypertension with initial HTN emergency requiring BIPAP  - noted to have morning hypertension as well as swings in blood pressure   - Moved valsartan to PM   - Changed clonidine pills to patch      #Palpitations   - 14 day ziopatch ordered and sent to outpatient cardiologist     Discharge Meds     Medication List      START taking these medications     cloNIDine 0.2 mg/24 hr patch; Commonly known as: Catapres-TTS; Apply one   patch on the skin and replace every 7 days, as directed. Do not start   before October 31, 2024.; Start taking on: October 31, 2024     CHANGE how you take these medications     valsartan 320 mg tablet; Commonly known as: Diovan; Take 1 tablet (320   mg) by mouth once daily in  the evening.; What changed: when to take this     CONTINUE taking these medications     * albuterol 90 mcg/actuation inhaler; Commonly known as: ProAir HFA;   Inhale 2 puffs every 4 hours if needed for wheezing or shortness of   breath.   * albuterol 1.25 mg/3 mL nebulizer solution; Take 3 mL (1.25 mg) by   nebulization every 6 hours if needed for wheezing or shortness of breath.   aspirin 81 mg EC tablet; Take 1 tablet (81 mg) by mouth once daily.   atorvastatin 80 mg tablet; Commonly known as: Lipitor; Take 1 tablet (80   mg) by mouth once daily at bedtime.   Breo Ellipta 100-25 mcg/dose inhaler; Generic drug: fluticasone   furoate-vilanteroL; Inhale 1 puff once daily.   calcium acetate 667 mg capsule; Commonly known as: Phoslo; Take 2   capsules (1,334 mg) by mouth 3 times daily (morning, midday, late   afternoon).   carvedilol 25 mg tablet; Commonly known as: Coreg; Take 2 tablets (50   mg) by mouth 2 times a day.   Eliquis 5 mg tablet; Generic drug: apixaban; Take 1 tablet (5 mg) by   mouth 2 times a day.   epoetin joceline 10,000 unit/mL injection; Commonly known as:   Epogen,Procrit; Inject 1 mL (10,000 Units) under the skin 3 times a week.   fluticasone 50 mcg/actuation nasal spray; Commonly known as: Flonase;   Administer 2 sprays into each nostril once daily. Shake gently. Before   first use, prime pump. After use, clean tip and replace cap.   gabapentin 300 mg capsule; Commonly known as: Neurontin; Take 1 capsule   (300 mg) by mouth once daily at bedtime.   hydrALAZINE 100 mg tablet; Commonly known as: Apresoline; Take 1 tablet   (100 mg) by mouth 3 times a day.   hydrOXYzine HCL 25 mg tablet; Commonly known as: Atarax; Take 1 tablet   (25 mg) by mouth every 6 hours if needed for anxiety, allergies or   itching.   isosorbide mononitrate  mg 24 hr tablet; Commonly known as: Imdur;   Take 1 tablet (120 mg) by mouth once daily. Do not crush or chew.   melatonin 5 mg tablet; Take 1 tablet (5 mg) by mouth  once daily at   bedtime.   NIFEdipine ER 90 mg 24 hr tablet; Commonly known as: Adalat CC; Take 1   tablet (90 mg) by mouth once daily in the morning. Take before meals. Do   not crush, chew, or split.   pantoprazole 40 mg EC tablet; Commonly known as: ProtoNix; Take 1 tablet   (40 mg) by mouth once daily in the morning. Take before meals. Do not   crush, chew, or split.   polyethylene glycol 17 gram/dose powder; Commonly known as: Glycolax,   Miralax; Take 17 g (1 scoop dissolved in liquid) by mouth once daily. Do   not start before July 26, 2024.   Renal Caps 1 mg capsule; Generic drug: vitamin B complex-vitamin C-folic   acid; Take 1 capsule by mouth once daily.   SUMAtriptan 25 mg tablet; Commonly known as: Imitrex; Take 1 tablet (25   mg) by mouth 1 time if needed for migraine. May repeat dose once in 2   hours if no relief.  Do not exceed 2 doses in 24 hours.   torsemide 20 mg tablet; Commonly known as: Demadex; Take 2 tablets once   daily on non-dialysis days only. Do not take on dialysis days  * This list has 2 medication(s) that are the same as other medications   prescribed for you. Read the directions carefully, and ask your doctor or   other care provider to review them with you.     STOP taking these medications     cloNIDine 0.2 mg tablet; Commonly known as: Catapres

## 2024-11-02 NOTE — NURSING NOTE
Report to Receiving RN:    Report To: PONCHO Avila  Time Report Called: 1443  Hand-Off Communication: Pt requested to come off tx early, Nephrologist was notified and pt signed AMA, Pt completed 2 hrs 40 mins, 1663 ml fluid removed, /74, HR 95, pt stable, A/O.   Complications During Treatment: No  Ultrafiltration Treatment: Yes  Medications Administered During Dialysis: Yes  Blood Products Administered During Dialysis: No  Labs Sent During Dialysis: No  Heparin Drip Rate Changes: No    Last Updated: 2:44 PM by SAKINA MOELLER

## 2024-11-02 NOTE — H&P
History Of Present Illness  Stacey Heath is a 54 y.o. 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 This started happening yesterday morning prompting her to come to the emergency department.  Patient had multiple episodes of nonbloody nonbilious emesis.  She reports that her last full dialysis session was 10/31/2024.  Because of the persistent nausea vomiting and associated abdominal pain patient thought that she would not be able to make it to her dialysis session today and as such presented to the emergency department due to her GI symptoms as well as shortness of breath.  Seen and evaluated in emergency department patient still having shortness of breath reports that her breathing is better with oxygen.  Denies headaches, vision changes, chest pain, palpitations, diarrhea, constipation, lower leg swelling or focal neurological deficits.    ED Course:  Vitals: T36.4 HR 99 RR 20 /94 SpO2 94% on 4 L nasal cannula  Labs: Please see labs section of this note    Imaging pertinent impressions: (Please see summary->RAD tab for full impression):   CXR: Pulmonary edema appearing overall similar to prior study    Interventions:  s/p hydralazine 20 mg IV x 1, ketorolac 15 mg IV x 1, ondansetron 4 mg p.o. x 1 and 4 mg IV x 1, metoclopramide 10 mg IV x 1, isosorbide mononitrate 120 mg p.o. hydralazine 100 mg p.o. x 1 Benadryl 25 mg p.o. x 1  Consults: None    ROS: A complete review of systems was performed and is otherwise negative except as noted in HPI     Past Medical History  She has a past medical history of Bacteremia due to methicillin resistant Staphylococcus aureus (07/08/2024), Cardiac/pericardial tamponade (01/20/2024), CHF (congestive heart failure), COPD (chronic obstructive pulmonary  disease) (Multi), Coronary artery disease, Disorder of sweat glands (02/25/2023), Dry eye syndrome of bilateral lacrimal glands (03/07/2017), ESRD (end stage renal disease) (Multi), Essential (primary) hypertension (12/27/2022), History of acute pancreatitis (12/21/2020), Low grade squamous intraepithelial lesion (LGSIL) on cervicovaginal cytologic smear (02/25/2023), Migraines, 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 (02/25/2023), and Type 2 myocardial infarction (Multi) (01/20/2024).    Surgical History  She has a past surgical history that includes Tubal ligation (03/07/2017); Other surgical history (03/07/2017); Appendectomy (03/07/2017); Other surgical history (03/07/2017); Other surgical history (12/08/2021); Other surgical history (12/08/2021); and CT angio abdomen w and or wo IV IV contrast (4/23/2023).     Social History  She reports that she has quit smoking. Her smoking use included cigarettes. She has been exposed to tobacco smoke. She has never used smokeless tobacco. She reports that she does not currently use alcohol. She reports that she does not currently use drugs after having used the following drugs: Cocaine and Marijuana.    Family History  Family History   Problem Relation Name Age of Onset    Other (Cerebrovascular Accident) Father      Heart failure Paternal Grandmother      Breast cancer Paternal Grandmother      Other (Primary Cervical Cancer) Paternal Grandmother      Diabetes Paternal Grandfather      Breast cancer Father's Sister      Ovarian cancer Father's Sister          Allergies  Iodine, Bee pollen, Bioflavonoids, Citrus and derivatives, Codeine, Flowers, and Shellfish containing products      Last Recorded Vitals  /77   Pulse 98   Temp 36.4 °C (97.5 °F) (Temporal)   Resp 20   Wt 53.1 kg (117 lb)   SpO2 99%     General Exam  General Appearance: Patient is awake, alert and cooperative, and appears to be in no acute distress.  Head: NCAT  Eyes: PERRL, EOMI. vision is grossly  intact.  Neck: No JVD  Cardiac: Normal S1 and S2. No murmurs. Rhythm is regular.  Lungs: Diminished lung sounds with bibasilar crackles.  Abdomen: Positive bowel sounds. Soft, nondistended, nontender. No guarding or rebound. No masses.  Musculoskeletal: Range of motion and strength intact in extremities.  Extremities: Warm well-perfused peripheral pulses intact, 1+ bilateral lower extremity edema.   Neuro: CN II-XII grossly intact.   Skin: Skin normal color, texture and turgor with no lesions or eruptions.  Psych:  appropriate mood and affect      Relevant Results      Results for orders placed or performed during the hospital encounter of 11/01/24 (from the past 24 hours)   CBC and Auto Differential   Result Value Ref Range    WBC 6.2 4.4 - 11.3 x10*3/uL    nRBC 0.0 0.0 - 0.0 /100 WBCs    RBC 3.34 (L) 4.00 - 5.20 x10*6/uL    Hemoglobin 9.6 (L) 12.0 - 16.0 g/dL    Hematocrit 29.6 (L) 36.0 - 46.0 %    MCV 89 80 - 100 fL    MCH 28.7 26.0 - 34.0 pg    MCHC 32.4 32.0 - 36.0 g/dL    RDW 16.0 (H) 11.5 - 14.5 %    Platelets 243 150 - 450 x10*3/uL    Neutrophils % 65.6 40.0 - 80.0 %    Immature Granulocytes %, Automated 0.2 0.0 - 0.9 %    Lymphocytes % 15.4 13.0 - 44.0 %    Monocytes % 7.3 2.0 - 10.0 %    Eosinophils % 9.6 0.0 - 6.0 %    Basophils % 1.9 0.0 - 2.0 %    Neutrophils Absolute 4.05 1.20 - 7.70 x10*3/uL    Immature Granulocytes Absolute, Automated 0.01 0.00 - 0.70 x10*3/uL    Lymphocytes Absolute 0.95 (L) 1.20 - 4.80 x10*3/uL    Monocytes Absolute 0.45 0.10 - 1.00 x10*3/uL    Eosinophils Absolute 0.59 0.00 - 0.70 x10*3/uL    Basophils Absolute 0.12 (H) 0.00 - 0.10 x10*3/uL   Comprehensive metabolic panel   Result Value Ref Range    Glucose 77 74 - 99 mg/dL    Sodium 140 136 - 145 mmol/L    Potassium 5.3 3.5 - 5.3 mmol/L    Chloride 97 (L) 98 - 107 mmol/L    Bicarbonate 30 21 - 32 mmol/L    Anion Gap 18 10 - 20 mmol/L    Urea Nitrogen 35 (H) 6 - 23 mg/dL    Creatinine 8.24 (H) 0.50 - 1.05 mg/dL    eGFR 5 (L) >60  mL/min/1.73m*2    Calcium 9.8 8.6 - 10.6 mg/dL    Albumin 4.3 3.4 - 5.0 g/dL    Alkaline Phosphatase 111 (H) 33 - 110 U/L    Total Protein 7.4 6.4 - 8.2 g/dL    AST 15 9 - 39 U/L    Bilirubin, Total 0.5 0.0 - 1.2 mg/dL    ALT 14 7 - 45 U/L   B-Type Natriuretic Peptide   Result Value Ref Range    BNP 1,679 (H) 0 - 99 pg/mL   Blood Gas Venous Full Panel   Result Value Ref Range    POCT pH, Venous 7.41 7.33 - 7.43 pH    POCT pCO2, Venous 51 41 - 51 mm Hg    POCT pO2, Venous 30 (L) 35 - 45 mm Hg    POCT SO2, Venous 44 (L) 45 - 75 %    POCT Oxy Hemoglobin, Venous 43.7 (L) 45.0 - 75.0 %    POCT Hematocrit Calculated, Venous 30.0 (L) 36.0 - 46.0 %    POCT Sodium, Venous 139 136 - 145 mmol/L    POCT Potassium, Venous 5.8 (H) 3.5 - 5.3 mmol/L    POCT Chloride, Venous 97 (L) 98 - 107 mmol/L    POCT Ionized Calicum, Venous 1.25 1.10 - 1.33 mmol/L    POCT Glucose, Venous 86 74 - 99 mg/dL    POCT Lactate, Venous 1.0 0.4 - 2.0 mmol/L    POCT Base Excess, Venous 6.6 (H) -2.0 - 3.0 mmol/L    POCT HCO3 Calculated, Venous 32.3 (H) 22.0 - 26.0 mmol/L    POCT Hemoglobin, Venous 10.0 (L) 12.0 - 16.0 g/dL    POCT Anion Gap, Venous 16.0 10.0 - 25.0 mmol/L    Patient Temperature 37.0 degrees Celsius    FiO2 28 %   Troponin I, High Sensitivity, Initial   Result Value Ref Range    Troponin I, High Sensitivity (CMC) 45 (H) 0 - 34 ng/L   Light Blue Top   Result Value Ref Range    Extra Tube Hold for add-ons.    ECG 12 lead   Result Value Ref Range    Ventricular Rate 95 BPM    Atrial Rate 95 BPM    KS Interval 144 ms    QRS Duration 84 ms    QT Interval 344 ms    QTC Calculation(Bazett) 432 ms    P Axis 54 degrees    R Axis 29 degrees    T Axis 174 degrees    QRS Count 16 beats    Q Onset 218 ms    P Onset 146 ms    P Offset 193 ms    T Offset 390 ms    QTC Fredericia 400 ms   Blood Gas Arterial Full Panel Unsolicited   Result Value Ref Range    POCT pH, Arterial 7.39 7.38 - 7.42 pH    POCT pCO2, Arterial 48 (H) 38 - 42 mm Hg    POCT pO2,  Arterial 43 (LL) 85 - 95 mm Hg    POCT SO2, Arterial 67 (L) 94 - 100 %    POCT Oxy Hemoglobin, Arterial 66.3 (L) 94.0 - 98.0 %    POCT Hematocrit Calculated, Arterial 28.0 (L) 36.0 - 46.0 %    POCT Sodium, Arterial 137 136 - 145 mmol/L    POCT Potassium, Arterial 5.5 (H) 3.5 - 5.3 mmol/L    POCT Chloride, Arterial 99 98 - 107 mmol/L    POCT Ionized Calcium, Arterial 1.22 1.10 - 1.33 mmol/L    POCT Glucose, Arterial 68 (L) 74 - 99 mg/dL    POCT Lactate, Arterial 0.9 0.4 - 2.0 mmol/L    POCT Base Excess, Arterial 3.6 (H) -2.0 - 3.0 mmol/L    POCT HCO3 Calculated, Arterial 29.1 (H) 22.0 - 26.0 mmol/L    POCT Hemoglobin, Arterial 9.2 (L) 12.0 - 16.0 g/dL    POCT Anion Gap, Arterial 14 10 - 25 mmo/L    Patient Temperature 37.0 degrees Celsius   Influenza A, and B PCR   Result Value Ref Range    Flu A Result Not Detected Not Detected    Flu B Result Not Detected Not Detected   Sars-CoV-2 PCR   Result Value Ref Range    Coronavirus 2019, PCR Not Detected Not Detected   Troponin, High Sensitivity, 1 Hour   Result Value Ref Range    Troponin I, High Sensitivity (CMC) 42 (H) 0 - 34 ng/L     *Note: Due to a large number of results and/or encounters for the requested time period, some results have not been displayed. A complete set of results can be found in Results Review.     ECG 12 lead    Result Date: 11/1/2024  Normal sinus rhythm Left ventricular hypertrophy with repolarization abnormality ( Jose De Jesus product ) Abnormal ECG When compared with ECG of 21-OCT-2024 06:18, No significant change was found See ED provider note for full interpretation and clinical correlation Confirmed by Bibi Toussaint ([id number]) on 11/1/2024 10:47:54 PM    XR chest 1 view    Result Date: 11/1/2024  Interpreted By:  Mitchell Alegria, STUDY: XR CHEST 1 VIEW;  11/1/2024 9:27 pm   INDICATION: Signs/Symptoms:SOB, patient w/ ESRD on HD.     COMPARISON: 10/21/2024   ACCESSION NUMBER(S): [id number]   ORDERING CLINICIAN: DUYEN SKY   FINDINGS:     The  heart is enlarged. The pulmonary vasculature is congested centrally and indistinct peripherally, with peribronchovascular and interlobular septal thickening, as well as indistinct mid to lower lung consolidative opacities consistent with pulmonary edema. Trace bilateral pleural effusions (left > right)       Pulmonary edema, appearing overall similar to prior study.     MACRO: None.   Signed by: Mitchell Alegria 11/1/2024 9:30 PM Dictation workstation:   [id number]        Assessment/Plan   Assessment & Plan  Hypertensive emergency      Stacey Heath is a 54 y.o. 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  Will admit to medicine for further management.    #Hypertensive crisis  -No acute red flag symptoms noted upon evaluation no hypertensive encephalopathy, focal neurological deficits altered mental status no seizures, headaches, vision changes no chest pain  -Is having shortness of breath with chest x-ray and physical exam suggestive of pulmonary edema  -Systolic BPs persistently elevated in the 200s recently down trended to 170s after multiple antihypertensive administration  -Placed on cardiac telemetry, monitor for any alarms or events  -Continue home carvedilol 50 mg twice daily, hydralazine 100 mg 3 times daily, isosorbide mononitrate 120 mg daily, nifedipine 90 mg daily, torsemide 20 mg daily, valsartan 320 mg daily  -Supplemental oxygenation to maintain SpO2 >90%  -If having acute neurological symptoms strongly consider stat CT head  -Will benefit from dialysis session today  -Antiemetics ordered as needed for nausea and vomiting    #Elevated troponin  #Elevated BNP  -Troponins elevated 45->42  -BNP elevated  1679  -Both are chronically elevated in this patient  -Likely multifactorial in the setting of hypertensive crisis and ESRD  -Low clinical index of suspicion for ACS process or acute decompensated heart failure  -No need to trend at this time, monitor symptoms, evaluate and escalate care as needed    #ESRD on HD  -Last full session on 10/31/2024  -Chest x-ray with pulmonary edema, unchanged from previous imaging  -Consult nephrology today for HD  -Renal dosing of medications, renal diet, renal multivitamin  -Continue home PhosLo 2 tabs with meals 3 times daily    #Normocytic anemia  -Hemoglobin 9.6 today, currently around her baseline  -Likely in the setting of chronic disease [renal]  -No evidence of acute bleeding, monitor and escalate care as needed    #T2DM  -Last hemoglobin A1c 5.2% June 2024  -Not on any antidiabetic regimen at home  -POCT glucose as needed  -Continue gabapentin for peripheral neuropathy    #COPD  -No concerns for acute COPD exacerbation, no wheezing on exam  -Her shortness of breath better explained by her pulmonary edema  -Continue home Breo Ellipta 1 puff daily, Flonase 2 sprays daily and albuterol MDI every 6 hours as needed for shortness of breath    #GERD  -Continue home PPI    #History of DVT  -Continue home apixaban    #HLD  -Continue home statin    F: None  E: Replete as needed  N: Renal  GI: PPI  DVT: Apixaban  CODE STATUS: Full code         A total of >75 minutes was spent on this visit reviewing previous notes including inpatient ED/Consult notes, chart review of ambulatory records in [facility] and OSH records in [facility], ordering tests and add on labs, medication reconciliation and adjusting meds, and documenting the findings in the note.    Robb Stone MD

## 2024-11-02 NOTE — ED PROCEDURE NOTE
Procedure  Critical Care    Performed by: Steve Majano DO  Authorized by: Reece Lance DO    Critical care provider statement:     Critical care time (minutes):  45    Critical care time was exclusive of:  Separately billable procedures and treating other patients    Critical care was necessary to treat or prevent imminent or life-threatening deterioration of the following conditions:  Cardiac failure and renal failure (Hypertensive emergency)    Critical care was time spent personally by me on the following activities:  Examination of patient, vascular access procedures, blood draw for specimens, ordering and performing treatments and interventions, ordering and review of laboratory studies, ordering and review of radiographic studies and review of old charts    Care discussed with: admitting provider                 Steve Majano DO  Resident  11/02/24 0534

## 2024-11-02 NOTE — NURSING NOTE
..Report from Sending RN:    Report From: PONCHO Avila  Recent Surgery of Procedure: No  Baseline Level of Consciousness (LOC): A& OX3  Oxygen Use: Yes  Type: 2L NC  Diabetic: Yes  Last BP Med Given Day of Dialysis: see emr  Last Pain Med Given: None  Lab Tests to be Obtained with Dialysis: No  Blood Transfusion to be Given During Dialysis: No  Available IV Access: Yes  Medications to be Administered During Dialysis: No  Continuous IV Infusion Running: No  Restraints on Currently or in the Last 24 Hours: No  Hand-Off Communication: Pt admitted to the ED for SOB and increase BP. Not on precaution, all BP medication given. Travel by cart.  Dialysis Catheter Dressing: AVF  Last Dressing Change: N/A

## 2024-11-02 NOTE — PROGRESS NOTES
Emergency Department Transition of Care Note       Signout   I received Stacey Heath in signout from Dr. Ellen Gauthier.  Please see the ED Provider Note for all HPI, PE and MDM up to the time of signout at 2300.  This is in addition to the primary record.    In brief Stacey Heath is an 54 y.o. female presenting for shortness of breath and nausea/vomiting.  The patient states that she tried to take her blood pressure medications today but had an episode of vomiting and threw these up and has been unable to tolerate oral intake since then.  Upon arrival to the emergency department the patient had a blood pressure 240/94 but had otherwise stable vital signs and was saturating well on room air.  IV access was extremely difficult as she has a fistula in her right upper extremity and multiple attempts at ultrasound-guided IVs were performed.  The patient had an attempt at an EJ placement by a medic which was also unsuccessful and the patient did have increasing shortness of breath after being placed in Trendelenburg.  She did have asymmetric B-lines with left greater than right as well as a left-sided small pleural effusion.    At the time of signout we were awaiting:  Labs, chest x-ray, reassessment and final disposition.    ED Course & Medical Decision Making   Medical Decision Making:  Under my care, the patient continued to be hypertensive and was complaining of a headache and increasing nausea and vomiting.  Occultly in access, p.o. hypertensive medications have been ordered as the patient's blood pressure continues to trend upward and systolic blood pressure was incalculable.  Central venous access was considered but a successful ultrasound-guided IV was placed prior to initiation of this.  The patient was complaining of fairly significant thoracicoabdominal pain as well as a moderate headache with his hypertensive episode.  The headache was gradual in onset and not consistent with subarachnoid hemorrhage  although she is at higher risk for this given her blood pressure but there is no indication for CT imaging of the head at this time and she has nonfocal neurologic exam.  She did have a new oxygen requirement as she desaturated to the mid 80s on room air but appropriately improved after being placed on 2 L of oxygen.  The patient did not appear to be in any significant respiratory distress but there was consideration for scape in the setting of severe hypertension but chest x-ray only showed mild pulmonary edema and there is no indication for BiPAP.  The patient was given a dose of IV hydralazine for her blood pressure, Dilaudid for her pain and Reglan for her headache and continued nausea and vomiting.  There was consideration for starting the patient on a nitroglycerin drip for her blood pressure in order to titrate to effect but her blood pressure did improve significantly to the 220s/90s after the IV hydralazine and Dilaudid was given.  The patient did continue to have some persistent nausea and vomiting and a mild headache but her thoracicoabdominal pain did resolve lessening the concern for aortic dissection or subarachnoid hemorrhage.  She was given an additional dose of Zofran as well as a headache cocktail including Toradol and Benadryl and began tolerating p.o. intake appropriately.  I discussed the patient with the admissions coordinator who recommended providing the patient with her home oral antihypertensives prior to admission.  She was given her home you she was given her home antihypertensive medications and blood pressure did improve appropriately and the patient was admitted to the internal medicine service in stable condition for further management of her symptoms and plans for dialysis today.    ED Course:  ED Course as of 11/02/24 0609 Fri Nov 01, 2024 2212 54-year-old female multiple medical problems including hypertension hyperlipidemia end-stage renal disease on dialysis Tuesday Thursday  Saturday asthma COPD presents with nausea vomiting and shortness of breath.  Patient states that today he developed nausea which prevented her keeping down her blood pressure medications.  She had a full run of dialysis yesterday.  She also states that she drank alcohol earlier today which likely prompted her nausea and vomiting.  Patient has had previous ED visits for flash pulmonary edema.  On physical exam patient is hypertensive to 250/110.  She appears in mild respiratory distress but is nontoxic.  She has rhonchi me crackles greater on the left than the right.  She has normal cardiac.  Point-of-care ultrasound shows asymmetric B-lines on the left versus the right.  She has a benign abdomen.  She has no lower extremity edema.  She has a right upper extremity fistula with a palpable thrill.  Patient with history of end-stage renal disease on dialysis with full run of dialysis yesterday presents with hypertension after nausea and vomiting prevented tolerance of her p.o. antihypertensives.  The nausea and vomiting may be secondary to gastritis given her alcohol intake pancreatitis is also a possibility.  Patient has a benign abdomen so a surgical emergency seems unlikely.  Patient's hypertension may be secondary to her discomfort as well as her medication intolerance.  We will start parenteral antihypertensives.  Patient with asymmetric B-lines there is concern for possible pneumonia versus pulmonary edema.  Will get a chest x-ray.  Patient likely will require inpatient admission given her profound hypertension and her intolerance of home antihypertension medications. [CM]   2230 ECG 12 lead  EKG interpreted by me (ED resident): Rate 95 bpm, normal sinus rhythm with a normal axis.  , normal QTc.  Hyperacute T waves present in precordial leads.  T wave inversions present in lead I, II, aVL, aVF, and V6 which are seen on previous EKG.  When compared with prior EKG from 10/21/2024, hyperacute T waves are now  present, no other significant changes. [HH]   2335 XR chest 1 view  Chest x-ray personally interpreted by me showing pulmonary edema and small left-sided pleural effusion but no evidence of focal pneumonia, pneumothorax or other concerning findings. [RS]   2342 Blood Gas Arterial Full Panel Unsolicited(!!)  Repeat VBG use for ultrasound line confirmation which does confirm that this was venous blood drawn. [RS]   Sat Nov 02, 2024   0606 Patient accepted to the internal medicine service in stable condition for further management of her symptoms.  Bed request under Dr. Carlson placed at this time. [RS]      ED Course User Index  [CM] Geoff Gleason MD  [HH] Ellen Gauthier MD  [RS] Steve Majano DO         Diagnoses as of 11/02/24 0609   Hypertensive emergency   Nausea and vomiting, unspecified vomiting type   Chronic pulmonary edema   History of end stage renal disease       Disposition   As a result of their workup, the patient will require admission to the hospital.  The patient was informed of her diagnosis.  The patient was given the opportunity to ask questions and I answered them. The patient agreed to be admitted to the hospital.    Procedures   Procedures    Patient seen and discussed with ED attending physician.    Steve Majano,   Emergency Medicine

## 2024-11-02 NOTE — PROCEDURES
"DIALYSIS NOTE:    Seen and examined during hemodialysis, undergoing treatment per submitted orders: 2 K, 2.5 Ca, 3  hours. Fluid removal 3 liters as tolerated (keep SBP> 90mmHg).     BP (!) 207/87 (BP Location: Left arm, Patient Position: Lying)   Pulse 91   Temp 36.3 °C (97.3 °F) (Temporal)   Resp 17   Ht 1.397 m (4' 7\")   Wt 53.1 kg (117 lb)   SpO2 97%   BMI 27.19 kg/m²         "

## 2024-11-03 VITALS
SYSTOLIC BLOOD PRESSURE: 139 MMHG | RESPIRATION RATE: 20 BRPM | WEIGHT: 117 LBS | HEIGHT: 55 IN | DIASTOLIC BLOOD PRESSURE: 67 MMHG | TEMPERATURE: 97 F | BODY MASS INDEX: 27.08 KG/M2 | OXYGEN SATURATION: 100 % | HEART RATE: 82 BPM

## 2024-11-03 LAB
ALBUMIN SERPL BCP-MCNC: 3.8 G/DL (ref 3.4–5)
ANION GAP SERPL CALC-SCNC: 18 MMOL/L (ref 10–20)
BUN SERPL-MCNC: 28 MG/DL (ref 6–23)
CALCIUM SERPL-MCNC: 9.1 MG/DL (ref 8.6–10.6)
CHLORIDE SERPL-SCNC: 98 MMOL/L (ref 98–107)
CO2 SERPL-SCNC: 29 MMOL/L (ref 21–32)
CREAT SERPL-MCNC: 7.55 MG/DL (ref 0.5–1.05)
EGFRCR SERPLBLD CKD-EPI 2021: 6 ML/MIN/1.73M*2
ERYTHROCYTE [DISTWIDTH] IN BLOOD BY AUTOMATED COUNT: 16.1 % (ref 11.5–14.5)
GLUCOSE SERPL-MCNC: 80 MG/DL (ref 74–99)
HCT VFR BLD AUTO: 30.7 % (ref 36–46)
HGB BLD-MCNC: 9.1 G/DL (ref 12–16)
MAGNESIUM SERPL-MCNC: 2.29 MG/DL (ref 1.6–2.4)
MCH RBC QN AUTO: 29.1 PG (ref 26–34)
MCHC RBC AUTO-ENTMCNC: 29.6 G/DL (ref 32–36)
MCV RBC AUTO: 98 FL (ref 80–100)
NRBC BLD-RTO: 0 /100 WBCS (ref 0–0)
PHOSPHATE SERPL-MCNC: 6.1 MG/DL (ref 2.5–4.9)
PLATELET # BLD AUTO: 236 X10*3/UL (ref 150–450)
POTASSIUM SERPL-SCNC: 5.2 MMOL/L (ref 3.5–5.3)
RBC # BLD AUTO: 3.13 X10*6/UL (ref 4–5.2)
SODIUM SERPL-SCNC: 140 MMOL/L (ref 136–145)
WBC # BLD AUTO: 8.3 X10*3/UL (ref 4.4–11.3)

## 2024-11-03 PROCEDURE — 2500000001 HC RX 250 WO HCPCS SELF ADMINISTERED DRUGS (ALT 637 FOR MEDICARE OP): Performed by: STUDENT IN AN ORGANIZED HEALTH CARE EDUCATION/TRAINING PROGRAM

## 2024-11-03 PROCEDURE — 99233 SBSQ HOSP IP/OBS HIGH 50: CPT | Performed by: STUDENT IN AN ORGANIZED HEALTH CARE EDUCATION/TRAINING PROGRAM

## 2024-11-03 PROCEDURE — 2500000004 HC RX 250 GENERAL PHARMACY W/ HCPCS (ALT 636 FOR OP/ED): Performed by: STUDENT IN AN ORGANIZED HEALTH CARE EDUCATION/TRAINING PROGRAM

## 2024-11-03 PROCEDURE — 1200000002 HC GENERAL ROOM WITH TELEMETRY DAILY

## 2024-11-03 PROCEDURE — 2500000002 HC RX 250 W HCPCS SELF ADMINISTERED DRUGS (ALT 637 FOR MEDICARE OP, ALT 636 FOR OP/ED): Performed by: STUDENT IN AN ORGANIZED HEALTH CARE EDUCATION/TRAINING PROGRAM

## 2024-11-03 PROCEDURE — 36415 COLL VENOUS BLD VENIPUNCTURE: CPT | Performed by: STUDENT IN AN ORGANIZED HEALTH CARE EDUCATION/TRAINING PROGRAM

## 2024-11-03 PROCEDURE — 83735 ASSAY OF MAGNESIUM: CPT | Performed by: STUDENT IN AN ORGANIZED HEALTH CARE EDUCATION/TRAINING PROGRAM

## 2024-11-03 PROCEDURE — 80069 RENAL FUNCTION PANEL: CPT | Performed by: STUDENT IN AN ORGANIZED HEALTH CARE EDUCATION/TRAINING PROGRAM

## 2024-11-03 PROCEDURE — 2500000005 HC RX 250 GENERAL PHARMACY W/O HCPCS: Performed by: STUDENT IN AN ORGANIZED HEALTH CARE EDUCATION/TRAINING PROGRAM

## 2024-11-03 PROCEDURE — 85027 COMPLETE CBC AUTOMATED: CPT | Performed by: STUDENT IN AN ORGANIZED HEALTH CARE EDUCATION/TRAINING PROGRAM

## 2024-11-03 RX ORDER — BENZONATATE 100 MG/1
100 CAPSULE ORAL 3 TIMES DAILY
Status: DISCONTINUED | OUTPATIENT
Start: 2024-11-03 | End: 2024-11-05 | Stop reason: HOSPADM

## 2024-11-03 RX ORDER — GUAIFENESIN/DEXTROMETHORPHAN 100-10MG/5
5 SYRUP ORAL EVERY 4 HOURS PRN
Status: DISCONTINUED | OUTPATIENT
Start: 2024-11-03 | End: 2024-11-05 | Stop reason: HOSPADM

## 2024-11-03 RX ORDER — ACETAMINOPHEN 325 MG/1
975 TABLET ORAL EVERY 8 HOURS
Status: DISCONTINUED | OUTPATIENT
Start: 2024-11-03 | End: 2024-11-05 | Stop reason: HOSPADM

## 2024-11-03 RX ORDER — DICYCLOMINE HYDROCHLORIDE 10 MG/1
10 CAPSULE ORAL 4 TIMES DAILY
Status: DISCONTINUED | OUTPATIENT
Start: 2024-11-03 | End: 2024-11-05 | Stop reason: HOSPADM

## 2024-11-03 RX ADMIN — ATORVASTATIN CALCIUM 80 MG: 80 TABLET, FILM COATED ORAL at 20:34

## 2024-11-03 RX ADMIN — HYDRALAZINE HYDROCHLORIDE 100 MG: 50 TABLET ORAL at 09:52

## 2024-11-03 RX ADMIN — ACETAMINOPHEN 975 MG: 325 TABLET, FILM COATED ORAL at 15:57

## 2024-11-03 RX ADMIN — BENZONATATE 100 MG: 100 CAPSULE ORAL at 06:40

## 2024-11-03 RX ADMIN — HYDRALAZINE HYDROCHLORIDE 10 MG: 20 INJECTION INTRAMUSCULAR; INTRAVENOUS at 05:16

## 2024-11-03 RX ADMIN — CARVEDILOL 50 MG: 12.5 TABLET, FILM COATED ORAL at 20:34

## 2024-11-03 RX ADMIN — APIXABAN 5 MG: 5 TABLET, FILM COATED ORAL at 20:35

## 2024-11-03 RX ADMIN — DIPHENHYDRAMINE HYDROCHLORIDE 25 MG: 25 CAPSULE ORAL at 15:57

## 2024-11-03 RX ADMIN — APIXABAN 5 MG: 5 TABLET, FILM COATED ORAL at 09:46

## 2024-11-03 RX ADMIN — CARVEDILOL 50 MG: 12.5 TABLET, FILM COATED ORAL at 09:46

## 2024-11-03 RX ADMIN — BENZONATATE 100 MG: 100 CAPSULE ORAL at 15:57

## 2024-11-03 RX ADMIN — FLUTICASONE PROPIONATE 2 SPRAY: 50 SPRAY, METERED NASAL at 15:57

## 2024-11-03 RX ADMIN — ISOSORBIDE MONONITRATE 120 MG: 30 TABLET, EXTENDED RELEASE ORAL at 09:45

## 2024-11-03 RX ADMIN — CLONIDINE 1 PATCH: 0.2 PATCH TRANSDERMAL at 12:08

## 2024-11-03 RX ADMIN — DICYCLOMINE HYDROCHLORIDE 10 MG: 10 CAPSULE ORAL at 17:41

## 2024-11-03 RX ADMIN — BENZONATATE 100 MG: 100 CAPSULE ORAL at 20:34

## 2024-11-03 RX ADMIN — ACETAMINOPHEN 650 MG: 325 TABLET ORAL at 09:46

## 2024-11-03 RX ADMIN — PANTOPRAZOLE SODIUM 40 MG: 40 TABLET, DELAYED RELEASE ORAL at 09:46

## 2024-11-03 RX ADMIN — NIFEDIPINE 90 MG: 90 TABLET, FILM COATED, EXTENDED RELEASE ORAL at 06:40

## 2024-11-03 RX ADMIN — DIPHENHYDRAMINE HYDROCHLORIDE 25 MG: 25 CAPSULE ORAL at 06:40

## 2024-11-03 RX ADMIN — CALCIUM ACETATE 1334 MG: 667 CAPSULE ORAL at 09:46

## 2024-11-03 RX ADMIN — DICYCLOMINE HYDROCHLORIDE 10 MG: 10 CAPSULE ORAL at 22:53

## 2024-11-03 RX ADMIN — ASPIRIN 81 MG: 81 TABLET, COATED ORAL at 09:46

## 2024-11-03 RX ADMIN — CALCIUM ACETATE 1334 MG: 667 CAPSULE ORAL at 12:39

## 2024-11-03 RX ADMIN — TRAMADOL HYDROCHLORIDE 50 MG: 50 TABLET, COATED ORAL at 06:41

## 2024-11-03 RX ADMIN — Medication 5 MG: at 20:35

## 2024-11-03 RX ADMIN — TORSEMIDE 20 MG: 20 TABLET ORAL at 09:46

## 2024-11-03 RX ADMIN — GABAPENTIN 300 MG: 300 CAPSULE ORAL at 20:34

## 2024-11-03 RX ADMIN — VALSARTAN 320 MG: 320 TABLET, FILM COATED ORAL at 20:34

## 2024-11-03 RX ADMIN — HYDRALAZINE HYDROCHLORIDE 100 MG: 50 TABLET ORAL at 15:57

## 2024-11-03 RX ADMIN — DICYCLOMINE HYDROCHLORIDE 10 MG: 10 CAPSULE ORAL at 13:03

## 2024-11-03 RX ADMIN — CALCIUM ACETATE 1334 MG: 667 CAPSULE ORAL at 17:41

## 2024-11-03 RX ADMIN — ONDANSETRON HYDROCHLORIDE 4 MG: 4 TABLET, FILM COATED ORAL at 06:41

## 2024-11-03 RX ADMIN — TRAMADOL HYDROCHLORIDE 50 MG: 50 TABLET, COATED ORAL at 20:35

## 2024-11-03 RX ADMIN — RENO CAPS 1 CAPSULE: 100; 1.5; 1.7; 20; 10; 1; 150; 5; 6 CAPSULE ORAL at 09:46

## 2024-11-03 RX ADMIN — GUAIFENESIN AND DEXTROMETHORPHAN 5 ML: 100; 10 SYRUP ORAL at 13:03

## 2024-11-03 SDOH — SOCIAL STABILITY: SOCIAL INSECURITY: DO YOU FEEL ANYONE HAS EXPLOITED OR TAKEN ADVANTAGE OF YOU FINANCIALLY OR OF YOUR PERSONAL PROPERTY?: NO

## 2024-11-03 SDOH — SOCIAL STABILITY: SOCIAL INSECURITY: ARE YOU OR HAVE YOU BEEN THREATENED OR ABUSED PHYSICALLY, EMOTIONALLY, OR SEXUALLY BY ANYONE?: NO

## 2024-11-03 SDOH — SOCIAL STABILITY: SOCIAL INSECURITY: HAS ANYONE EVER THREATENED TO HURT YOUR FAMILY OR YOUR PETS?: NO

## 2024-11-03 SDOH — SOCIAL STABILITY: SOCIAL INSECURITY: DO YOU FEEL UNSAFE GOING BACK TO THE PLACE WHERE YOU ARE LIVING?: NO

## 2024-11-03 SDOH — SOCIAL STABILITY: SOCIAL INSECURITY: DOES ANYONE TRY TO KEEP YOU FROM HAVING/CONTACTING OTHER FRIENDS OR DOING THINGS OUTSIDE YOUR HOME?: NO

## 2024-11-03 SDOH — SOCIAL STABILITY: SOCIAL INSECURITY: HAVE YOU HAD ANY THOUGHTS OF HARMING ANYONE ELSE?: NO

## 2024-11-03 SDOH — SOCIAL STABILITY: SOCIAL INSECURITY: HAVE YOU HAD THOUGHTS OF HARMING ANYONE ELSE?: NO

## 2024-11-03 SDOH — SOCIAL STABILITY: SOCIAL INSECURITY: ARE THERE ANY APPARENT SIGNS OF INJURIES/BEHAVIORS THAT COULD BE RELATED TO ABUSE/NEGLECT?: NO

## 2024-11-03 SDOH — SOCIAL STABILITY: SOCIAL INSECURITY: ABUSE: ADULT

## 2024-11-03 ASSESSMENT — ACTIVITIES OF DAILY LIVING (ADL)
ADEQUATE_TO_COMPLETE_ADL: YES
FEEDING YOURSELF: INDEPENDENT
GROOMING: INDEPENDENT
JUDGMENT_ADEQUATE_SAFELY_COMPLETE_DAILY_ACTIVITIES: YES
TOILETING: INDEPENDENT
BATHING: INDEPENDENT
WALKS IN HOME: INDEPENDENT
PATIENT'S MEMORY ADEQUATE TO SAFELY COMPLETE DAILY ACTIVITIES?: YES
DRESSING YOURSELF: INDEPENDENT
HEARING - LEFT EAR: FUNCTIONAL
HEARING - RIGHT EAR: FUNCTIONAL

## 2024-11-03 ASSESSMENT — PAIN SCALES - GENERAL
PAINLEVEL_OUTOF10: 6
PAINLEVEL_OUTOF10: 7
PAINLEVEL_OUTOF10: 6
PAINLEVEL_OUTOF10: 7
PAINLEVEL_OUTOF10: 4

## 2024-11-03 ASSESSMENT — COGNITIVE AND FUNCTIONAL STATUS - GENERAL
MOBILITY SCORE: 23
CLIMB 3 TO 5 STEPS WITH RAILING: A LITTLE
DAILY ACTIVITIY SCORE: 24

## 2024-11-03 ASSESSMENT — LIFESTYLE VARIABLES
HOW OFTEN DO YOU HAVE A DRINK CONTAINING ALCOHOL: NEVER
HOW MANY STANDARD DRINKS CONTAINING ALCOHOL DO YOU HAVE ON A TYPICAL DAY: PATIENT DOES NOT DRINK
AUDIT-C TOTAL SCORE: 0
SKIP TO QUESTIONS 9-10: 1
HOW OFTEN DO YOU HAVE 6 OR MORE DRINKS ON ONE OCCASION: NEVER
AUDIT-C TOTAL SCORE: 0

## 2024-11-03 ASSESSMENT — PAIN - FUNCTIONAL ASSESSMENT
PAIN_FUNCTIONAL_ASSESSMENT: 0-10

## 2024-11-03 ASSESSMENT — PAIN DESCRIPTION - LOCATION
LOCATION: ABDOMEN
LOCATION: ABDOMEN
LOCATION: HEAD

## 2024-11-03 ASSESSMENT — PAIN SCALES - PAIN ASSESSMENT IN ADVANCED DEMENTIA (PAINAD): TOTALSCORE: MEDICATION (SEE MAR)

## 2024-11-03 ASSESSMENT — PAIN DESCRIPTION - ORIENTATION: ORIENTATION: LOWER

## 2024-11-03 NOTE — NURSING NOTE
11/3/24 1336 VAST RN consulted for PIV restart. Pt is known to VAST to be a difficult stick. 1 attempt made, without success. Bedside RN aware that pt will need alternative access.

## 2024-11-03 NOTE — PROGRESS NOTES
"                                                                                                                                                                                                                                                                                             INTERNAL MEDICINE PROGRESS NOTE     SUBJECTIVE     Pt was seen and examined at beside. No acute events overnight.  Pt states she had to stop dialysis early yesterday because she \"bottomed out\" and did not feel well. States her nausea and abdominal pain is still present. She is also have increased congestion, cough and runny nose. She states she is keeping food down.     OBJECTIVE      Visit Vitals  /68   Pulse 78   Temp 36.2 °C (97.2 °F)   Resp 18        Intake/Output Summary (Last 24 hours) at 11/3/2024 1204  Last data filed at 11/2/2024 1526  Gross per 24 hour   Intake 600 ml   Output 2050 ml   Net -1450 ml       Physical Exam   General Appearance: Patient is awake, alert and cooperative, and appears to be in no acute distress.  Head: NCAT  Eyes: PERRL, EOMI. vision is grossly intact.  Neck: No JVD  Cardiac: Normal S1 and S2. No murmurs. Rhythm is regular.  Lungs: Diminished lung sounds with bibasilar crackles.  Abdomen: Positive bowel sounds. Soft, nondistended, nontender. No guarding or rebound. No masses.  Musculoskeletal: Range of motion and strength intact in extremities.  Extremities: Warm well-perfused peripheral pulses intact, trace bilateral lower extremity edema.   Neuro: CN II-XII grossly intact.   Skin: Skin normal color, texture and turgor with no lesions or eruptions.  Psych:  appropriate mood and affect    Current Meds   acetaminophen, 975 mg, oral, q8h  apixaban, 5 mg, oral, BID  aspirin, 81 mg, oral, Daily  atorvastatin, 80 mg, oral, Nightly  benzonatate, 100 mg, oral, TID  calcium acetate, 1,334 mg, oral, TID  carvedilol, 50 mg, oral, BID  cloNIDine, 1 patch, transdermal, Weekly  fluticasone, 2 spray, Each " Nostril, Daily  fluticasone furoate-vilanteroL, 1 puff, inhalation, Daily  gabapentin, 300 mg, oral, Nightly  hydrALAZINE, 100 mg, oral, TID  isosorbide mononitrate ER, 120 mg, oral, Daily  melatonin, 5 mg, oral, Nightly  NIFEdipine ER, 90 mg, oral, Daily before breakfast  pantoprazole, 40 mg, oral, Daily before breakfast  polyethylene glycol, 17 g, oral, Daily  sennosides-docusate sodium, 2 tablet, oral, BID  torsemide, 20 mg, oral, Daily  valsartan, 320 mg, oral, q PM  vitamin B complex-vitamin C-folic acid, 1 capsule, oral, Daily       PRN medications: albuterol, dextromethorphan-guaifenesin, diphenhydrAMINE, hydrALAZINE, ondansetron **OR** ondansetron, prochlorperazine **OR** prochlorperazine **OR** prochlorperazine, traMADol     LABS and IMAGING     WBC   Date Value Ref Range Status   11/01/2024 6.2 4.4 - 11.3 x10*3/uL Final     Hemoglobin   Date Value Ref Range Status   11/01/2024 9.6 (L) 12.0 - 16.0 g/dL Final     Hematocrit   Date Value Ref Range Status   11/01/2024 29.6 (L) 36.0 - 46.0 % Final     Bicarbonate   Date Value Ref Range Status   11/01/2024 30 21 - 32 mmol/L Final     Creatinine   Date Value Ref Range Status   11/01/2024 8.24 (H) 0.50 - 1.05 mg/dL Final     Calcium   Date Value Ref Range Status   11/01/2024 9.8 8.6 - 10.6 mg/dL Final       XR abdomen 1 view  Narrative: Interpreted By:  Deonna Hawkins,   STUDY:  XR ABDOMEN 1 VIEW;  11/2/2024 10:44 am      INDICATION:  Signs/Symptoms:abdominal pain; N/V.      COMPARISON:  None.      ACCESSION NUMBER(S):  [id number]      ORDERING CLINICIAN:  AYDIN ALICEA      FINDINGS:  Nonobstructive bowel gas pattern. Limited evaluation of  pneumoperitoneum on supine imaging, however no gross evidence of free  air is noted. Gas-filled colonic loops noted. No significant colonic  stool burden. Visualized lungs are clear.      Osseous structures demonstrate no acute bony changes.      Impression: 1. Nonobstructive bowel gas pattern  2. No significant  colonic stool burden.      MACRO:  None      Signed by: Deonna Hawkins 11/2/2024 10:59 AM  Dictation workstation:   [id number]     ASSESSMENT / PLANS    Stacey Heath is a 54 y.o. 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  Will admit to medicine for further management. Pt also with runny nose, cough, and congestion, likely also component of viral URI contributing to this.      #Hypertensive crisis  ::No acute red flag symptoms noted upon evaluation no hypertensive encephalopathy, focal neurological deficits altered mental status no seizures, headaches, vision changes no chest pain  ::Is having shortness of breath with chest x-ray and physical exam suggestive of pulmonary edema  ::Systolic BPs persistently elevated in the 200s recently down trended to 170s after multiple antihypertensive administration  -Placed on cardiac telemetry, monitor for any alarms or events  -Continue home carvedilol 50 mg twice daily, hydralazine 100 mg 3 times daily, isosorbide mononitrate 120 mg daily, nifedipine 90 mg daily, torsemide 20 mg daily, valsartan 320 mg daily, clonidine patch weekly   -Supplemental oxygenation to maintain SpO2 >90%  -dialysis consulted and following     #Abdominal pain   #Nausea and Vomiting   ::KUB unremarkable, no inflammation or stool burden  -Antiemetics ordered as needed for nausea and vomiting  -has improved and pt taking in diet   -will start bentyl for pain     #Elevated troponin  #Elevated BNP  ::Troponins elevated 45->42  ::BNP elevated 1679  ::Both are chronically elevated in this patient  ::Likely multifactorial in the setting of hypertensive crisis and ESRD  ::Low clinical index of suspicion for  ACS process or acute decompensated heart failure  -No need to trend at this time, monitor symptoms, evaluate and escalate care as needed     #ESRD on HD  ::Last full session on 10/31/2024  ::Chest x-ray with pulmonary edema, unchanged from previous imaging  -nephrology following for HD   -Renal dosing of medications, renal multivitamin  -Continue home PhosLo 2 tabs with meals 3 times daily     #Normocytic anemia  -Hemoglobin 9.6 today, currently around her baseline  -Likely in the setting of chronic disease [renal]  -No evidence of acute bleeding, monitor and escalate care as needed     #T2DM  -Last hemoglobin A1c 5.2% June 2024  -Not on any antidiabetic regimen at home  -POCT glucose as needed  -Continue gabapentin for peripheral neuropathy     #COPD  -No concerns for acute COPD exacerbation, no wheezing on exam  -Her shortness of breath better explained by her pulmonary edema  -Continue home Breo Ellipta 1 puff daily, Flonase 2 sprays daily and albuterol MDI every 6 hours as needed for shortness of breath  -currently on 2L, no home O2 requirement, will wean as tolerated      #GERD  -Continue home PPI     #History of DVT  -Continue home apixaban     #HLD  -Continue home statin    #Hx of intermittent palpitations   -ordered 14 day ziopatch on last admission; has fallen off  -will reorder at discharge   -has appt with cardiologist on 11/12; may need to push back this appointment        F: None  E: Replete as needed  N: Renal  GI: PPI  DVT: Apixaban  CODE STATUS: Full code        A total of >60 minutes was spent on this visit reviewing previous notes including inpatient ED/Consult notes, chart review of ambulatory records in [facility] and OSH records in [facility], ordering tests and add on labs, medication reconciliation and adjusting meds, and documenting the findings in the note.      Raquel Carlson MD

## 2024-11-04 PROCEDURE — 2500000001 HC RX 250 WO HCPCS SELF ADMINISTERED DRUGS (ALT 637 FOR MEDICARE OP): Performed by: STUDENT IN AN ORGANIZED HEALTH CARE EDUCATION/TRAINING PROGRAM

## 2024-11-04 PROCEDURE — 1200000002 HC GENERAL ROOM WITH TELEMETRY DAILY

## 2024-11-04 PROCEDURE — 99233 SBSQ HOSP IP/OBS HIGH 50: CPT | Performed by: STUDENT IN AN ORGANIZED HEALTH CARE EDUCATION/TRAINING PROGRAM

## 2024-11-04 PROCEDURE — 2500000005 HC RX 250 GENERAL PHARMACY W/O HCPCS: Performed by: STUDENT IN AN ORGANIZED HEALTH CARE EDUCATION/TRAINING PROGRAM

## 2024-11-04 PROCEDURE — 2500000002 HC RX 250 W HCPCS SELF ADMINISTERED DRUGS (ALT 637 FOR MEDICARE OP, ALT 636 FOR OP/ED): Performed by: STUDENT IN AN ORGANIZED HEALTH CARE EDUCATION/TRAINING PROGRAM

## 2024-11-04 PROCEDURE — 94640 AIRWAY INHALATION TREATMENT: CPT

## 2024-11-04 PROCEDURE — 99233 SBSQ HOSP IP/OBS HIGH 50: CPT | Performed by: NURSE PRACTITIONER

## 2024-11-04 RX ORDER — TRAMADOL HYDROCHLORIDE 50 MG/1
50 TABLET ORAL EVERY 12 HOURS PRN
Status: DISCONTINUED | OUTPATIENT
Start: 2024-11-04 | End: 2024-11-05 | Stop reason: HOSPADM

## 2024-11-04 RX ADMIN — TORSEMIDE 20 MG: 20 TABLET ORAL at 08:43

## 2024-11-04 RX ADMIN — BENZONATATE 100 MG: 100 CAPSULE ORAL at 14:20

## 2024-11-04 RX ADMIN — FLUTICASONE PROPIONATE 2 SPRAY: 50 SPRAY, METERED NASAL at 09:03

## 2024-11-04 RX ADMIN — DICYCLOMINE HYDROCHLORIDE 10 MG: 10 CAPSULE ORAL at 21:08

## 2024-11-04 RX ADMIN — CALCIUM ACETATE 1334 MG: 667 CAPSULE ORAL at 08:45

## 2024-11-04 RX ADMIN — HYDRALAZINE HYDROCHLORIDE 100 MG: 50 TABLET ORAL at 08:45

## 2024-11-04 RX ADMIN — ACETAMINOPHEN 975 MG: 325 TABLET, FILM COATED ORAL at 08:45

## 2024-11-04 RX ADMIN — ATORVASTATIN CALCIUM 80 MG: 80 TABLET, FILM COATED ORAL at 21:08

## 2024-11-04 RX ADMIN — TRAMADOL HYDROCHLORIDE 50 MG: 50 TABLET, COATED ORAL at 09:43

## 2024-11-04 RX ADMIN — PANTOPRAZOLE SODIUM 40 MG: 40 TABLET, DELAYED RELEASE ORAL at 05:53

## 2024-11-04 RX ADMIN — CALCIUM ACETATE 1334 MG: 667 CAPSULE ORAL at 12:49

## 2024-11-04 RX ADMIN — Medication 2 L/MIN: at 21:10

## 2024-11-04 RX ADMIN — BENZONATATE 100 MG: 100 CAPSULE ORAL at 21:08

## 2024-11-04 RX ADMIN — ACETAMINOPHEN 975 MG: 325 TABLET, FILM COATED ORAL at 00:21

## 2024-11-04 RX ADMIN — CALCIUM ACETATE 1334 MG: 667 CAPSULE ORAL at 16:00

## 2024-11-04 RX ADMIN — CARVEDILOL 50 MG: 12.5 TABLET, FILM COATED ORAL at 08:44

## 2024-11-04 RX ADMIN — TRAMADOL HYDROCHLORIDE 50 MG: 50 TABLET, COATED ORAL at 21:09

## 2024-11-04 RX ADMIN — APIXABAN 5 MG: 5 TABLET, FILM COATED ORAL at 08:45

## 2024-11-04 RX ADMIN — GABAPENTIN 300 MG: 300 CAPSULE ORAL at 21:08

## 2024-11-04 RX ADMIN — DICYCLOMINE HYDROCHLORIDE 10 MG: 10 CAPSULE ORAL at 05:53

## 2024-11-04 RX ADMIN — Medication 5 MG: at 21:08

## 2024-11-04 RX ADMIN — GUAIFENESIN AND DEXTROMETHORPHAN 5 ML: 100; 10 SYRUP ORAL at 05:53

## 2024-11-04 RX ADMIN — ASPIRIN 81 MG: 81 TABLET, COATED ORAL at 08:45

## 2024-11-04 RX ADMIN — DICYCLOMINE HYDROCHLORIDE 10 MG: 10 CAPSULE ORAL at 16:00

## 2024-11-04 RX ADMIN — NIFEDIPINE 90 MG: 90 TABLET, FILM COATED, EXTENDED RELEASE ORAL at 05:53

## 2024-11-04 RX ADMIN — FLUTICASONE FUROATE AND VILANTEROL TRIFENATATE 1 PUFF: 100; 25 POWDER RESPIRATORY (INHALATION) at 07:52

## 2024-11-04 RX ADMIN — ACETAMINOPHEN 975 MG: 325 TABLET, FILM COATED ORAL at 16:00

## 2024-11-04 RX ADMIN — APIXABAN 5 MG: 5 TABLET, FILM COATED ORAL at 21:08

## 2024-11-04 RX ADMIN — ISOSORBIDE MONONITRATE 120 MG: 30 TABLET, EXTENDED RELEASE ORAL at 08:44

## 2024-11-04 RX ADMIN — DICYCLOMINE HYDROCHLORIDE 10 MG: 10 CAPSULE ORAL at 12:49

## 2024-11-04 RX ADMIN — DIPHENHYDRAMINE HYDROCHLORIDE 25 MG: 25 CAPSULE ORAL at 08:50

## 2024-11-04 RX ADMIN — CARVEDILOL 50 MG: 12.5 TABLET, FILM COATED ORAL at 21:09

## 2024-11-04 RX ADMIN — BENZONATATE 100 MG: 100 CAPSULE ORAL at 08:44

## 2024-11-04 RX ADMIN — Medication 2 L/MIN: at 07:52

## 2024-11-04 RX ADMIN — RENO CAPS 1 CAPSULE: 100; 1.5; 1.7; 20; 10; 1; 150; 5; 6 CAPSULE ORAL at 08:44

## 2024-11-04 RX ADMIN — VALSARTAN 320 MG: 320 TABLET, FILM COATED ORAL at 21:09

## 2024-11-04 ASSESSMENT — PAIN - FUNCTIONAL ASSESSMENT
PAIN_FUNCTIONAL_ASSESSMENT: 0-10

## 2024-11-04 ASSESSMENT — COGNITIVE AND FUNCTIONAL STATUS - GENERAL
CLIMB 3 TO 5 STEPS WITH RAILING: A LITTLE
DAILY ACTIVITIY SCORE: 24
MOBILITY SCORE: 23
CLIMB 3 TO 5 STEPS WITH RAILING: A LITTLE
DAILY ACTIVITIY SCORE: 24
MOBILITY SCORE: 23

## 2024-11-04 ASSESSMENT — PAIN SCALES - GENERAL
PAINLEVEL_OUTOF10: 6
PAINLEVEL_OUTOF10: 3
PAINLEVEL_OUTOF10: 7
PAINLEVEL_OUTOF10: 8
PAINLEVEL_OUTOF10: 7

## 2024-11-04 ASSESSMENT — PAIN DESCRIPTION - LOCATION
LOCATION: ABDOMEN
LOCATION: ABDOMEN

## 2024-11-04 ASSESSMENT — PAIN DESCRIPTION - ORIENTATION
ORIENTATION: LOWER
ORIENTATION: LOWER

## 2024-11-04 NOTE — PROGRESS NOTES
INTERNAL MEDICINE PROGRESS NOTE     SUBJECTIVE     Pt was seen and examined at beside. No acute events overnight.  Pt states still feeling abdominal pain today and nausea; however no emesis.     OBJECTIVE      Visit Vitals  /64 (BP Location: Left arm, Patient Position: Lying)   Pulse 76   Temp 36.4 °C (97.5 °F)   Resp 18        Intake/Output Summary (Last 24 hours) at 11/4/2024 1234  Last data filed at 11/4/2024 0851  Gross per 24 hour   Intake 1190 ml   Output --   Net 1190 ml       Physical Exam   General Appearance: Patient is awake, alert and cooperative, and appears to be in no acute distress.  Head: NCAT  Eyes: PERRL, EOMI. vision is grossly intact.  Neck: No JVD  Cardiac: Normal S1 and S2. No murmurs. Rhythm is regular.  Lungs: Diminished lung sounds with bibasilar crackles.  Abdomen: Positive bowel sounds. Soft, nondistended, slightly tender in hypogastric area. No guarding or rebound. No masses.  Musculoskeletal: Range of motion and strength intact in extremities.  Extremities: Warm well-perfused peripheral pulses intact, trace bilateral lower extremity edema.   Neuro: CN II-XII grossly intact.   Skin: Skin normal color, texture and turgor with no lesions or eruptions.  Psych:  appropriate mood and affect    Current Meds   acetaminophen, 975 mg, oral, q8h  apixaban, 5 mg, oral, BID  aspirin, 81 mg, oral, Daily  atorvastatin, 80 mg, oral, Nightly  benzonatate, 100 mg, oral, TID  calcium acetate, 1,334 mg, oral, TID  carvedilol, 50 mg, oral, BID  cloNIDine, 1 patch, transdermal, Weekly  dicyclomine, 10 mg, oral, 4x daily  fluticasone, 2 spray, Each Nostril, Daily  fluticasone furoate-vilanteroL, 1 puff, inhalation,  Daily  gabapentin, 300 mg, oral, Nightly  hydrALAZINE, 100 mg, oral, TID  isosorbide mononitrate ER, 120 mg, oral, Daily  melatonin, 5 mg, oral, Nightly  NIFEdipine ER, 90 mg, oral, Daily before breakfast  oxygen, , inhalation, Continuous - Inhalation  pantoprazole, 40 mg, oral, Daily before breakfast  polyethylene glycol, 17 g, oral, Daily  sennosides-docusate sodium, 2 tablet, oral, BID  torsemide, 20 mg, oral, Daily  valsartan, 320 mg, oral, q PM  vitamin B complex-vitamin C-folic acid, 1 capsule, oral, Daily       PRN medications: albuterol, dextromethorphan-guaifenesin, diphenhydrAMINE, hydrALAZINE, ondansetron **OR** ondansetron, prochlorperazine **OR** prochlorperazine **OR** prochlorperazine, traMADol     LABS and IMAGING     WBC   Date Value Ref Range Status   11/03/2024 8.3 4.4 - 11.3 x10*3/uL Final   11/01/2024 6.2 4.4 - 11.3 x10*3/uL Final     Hemoglobin   Date Value Ref Range Status   11/03/2024 9.1 (L) 12.0 - 16.0 g/dL Final   11/01/2024 9.6 (L) 12.0 - 16.0 g/dL Final     Hematocrit   Date Value Ref Range Status   11/03/2024 30.7 (L) 36.0 - 46.0 % Final   11/01/2024 29.6 (L) 36.0 - 46.0 % Final     Bicarbonate   Date Value Ref Range Status   11/03/2024 29 21 - 32 mmol/L Final   11/01/2024 30 21 - 32 mmol/L Final     Creatinine   Date Value Ref Range Status   11/03/2024 7.55 (H) 0.50 - 1.05 mg/dL Final   11/01/2024 8.24 (H) 0.50 - 1.05 mg/dL Final     Calcium   Date Value Ref Range Status   11/03/2024 9.1 8.6 - 10.6 mg/dL Final   11/01/2024 9.8 8.6 - 10.6 mg/dL Final       XR abdomen 1 view  Narrative: Interpreted By:  Deonna Hawkins,   STUDY:  XR ABDOMEN 1 VIEW;  11/2/2024 10:44 am      INDICATION:  Signs/Symptoms:abdominal pain; N/V.      COMPARISON:  None.      ACCESSION NUMBER(S):  [id number]      ORDERING CLINICIAN:  AYDIN ALICEA      FINDINGS:  Nonobstructive bowel gas pattern. Limited evaluation of  pneumoperitoneum on supine imaging, however no gross evidence of free  air is noted.  Gas-filled colonic loops noted. No significant colonic  stool burden. Visualized lungs are clear.      Osseous structures demonstrate no acute bony changes.      Impression: 1. Nonobstructive bowel gas pattern  2. No significant colonic stool burden.      MACRO:  None      Signed by: Deonna Hawkins 11/2/2024 10:59 AM  Dictation workstation:   [id number]     ASSESSMENT / PLANS    Stacey Heath is a 54 y.o. 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  Will admit to medicine for further management. Pt also with runny nose, cough, and congestion, likely also component of viral URI contributing to this.      #Hypertensive crisis  ::No acute red flag symptoms noted upon evaluation no hypertensive encephalopathy, focal neurological deficits altered mental status no seizures, headaches, vision changes no chest pain  ::Is having shortness of breath with chest x-ray and physical exam suggestive of pulmonary edema  ::Systolic BPs persistently elevated in the 200s recently down trended to 170s after multiple antihypertensive administration  -Placed on cardiac telemetry, monitor for any alarms or events  -Continue home carvedilol 50 mg twice daily, hydralazine 100 mg 3 times daily, isosorbide mononitrate 120 mg daily, nifedipine 90 mg daily, torsemide 20 mg daily, valsartan 320 mg daily, clonidine patch weekly   -Supplemental oxygenation to maintain SpO2 >90%  -dialysis consulted and following; on dialysis days can give BP meds after dialysis to stop pt from dropping BP during HD sessions     #Abdominal pain   #Nausea and Vomiting   ::KUB unremarkable, no inflammation or stool burden  -Antiemetics ordered as needed for nausea  and vomiting  -has improved and pt taking in diet   -will start bentyl for pain     #Elevated troponin  #Elevated BNP  ::Troponins elevated 45->42  ::BNP elevated 1679  ::Both are chronically elevated in this patient  ::Likely multifactorial in the setting of hypertensive crisis and ESRD  ::Low clinical index of suspicion for ACS process or acute decompensated heart failure  -No need to trend at this time, monitor symptoms, evaluate and escalate care as needed     #ESRD on HD  ::Last full session on 10/31/2024  ::Chest x-ray with pulmonary edema, unchanged from previous imaging  -nephrology following for HD   -Renal dosing of medications, renal multivitamin  -Continue home PhosLo 2 tabs with meals 3 times daily     #Normocytic anemia  -Hemoglobin 9.6 today, currently around her baseline  -Likely in the setting of chronic disease [renal]  -No evidence of acute bleeding, monitor and escalate care as needed     #T2DM  -Last hemoglobin A1c 5.2% June 2024  -Not on any antidiabetic regimen at home  -POCT glucose as needed  -Continue gabapentin for peripheral neuropathy     #COPD  -No concerns for acute COPD exacerbation, no wheezing on exam  -Her shortness of breath better explained by her pulmonary edema  -Continue home Breo Ellipta 1 puff daily, Flonase 2 sprays daily and albuterol MDI every 6 hours as needed for shortness of breath  -currently on 2L, no home O2 requirement, will wean as tolerated      #GERD  -Continue home PPI     #History of DVT  -Continue home apixaban     #HLD  -Continue home statin    #Hx of intermittent palpitations   -ordered 14 day ziopatch on last admission; has fallen off  -will reorder at discharge   -has appt with cardiologist on 11/12; may need to push back this appointment     #Dispo: can likely discharge on 11/5      F: None  E: Replete as needed  N: Renal  GI: PPI  DVT: Apixaban  CODE STATUS: Full code        A total of >60 minutes was spent on this visit reviewing previous notes  including inpatient ED/Consult notes, chart review of ambulatory records in [facility] and OSH records in [facility], ordering tests and add on labs, medication reconciliation and adjusting meds, and documenting the findings in the note.      Raquel Carlson MD

## 2024-11-04 NOTE — NURSING NOTE
Per Dr. Carlson, please hold AM doses of BP meds until after she comes back from HD as she drops during treatment. Will inform nighshift RN to pass along to dayshift RN

## 2024-11-04 NOTE — CONSULTS
"NEPHROLOGY NEW CONSULT NOTE     Reason for consult: ESRD management  Requesting physician: Dr. Carlson    HPI:  Stacey Heath is a 54 y.o. female, admitted for shortness of breath, abdominal pain with nausea and vomiting.  Nephrology was consulted for ESRD management. Patient dialyzes on a TTS schedule, via [facility] AVF , at [facility] ,  nephrologist. Last outpatient dialysis 10/31, post dialysis wt 56.5, EDW 51 kg.    Per chart review:  \"Because of the persistent nausea vomiting and associated abdominal pain patient thought that she would not be able to make it to her dialysis session today and as such presented to the emergency department due to her GI symptoms as well as shortness of breath. Seen and evaluated in emergency department patient still having shortness of breath reports that her breathing is better with oxygen. Denies headaches, vision changes, chest pain, palpitations, diarrhea, constipation, lower leg swelling or focal neurological deficits.\"    Past Medical History:   Diagnosis Date    Bacteremia due to methicillin resistant Staphylococcus aureus 07/08/2024    Cardiac/pericardial tamponade 01/20/2024    CHF (congestive heart failure)     COPD (chronic obstructive pulmonary disease) (Multi)     Coronary artery disease     Disorder of sweat glands 02/25/2023    Dry eye syndrome of bilateral lacrimal glands 03/07/2017    Dry eyes    ESRD (end stage renal disease) (Multi)     Essential (primary) hypertension 12/27/2022    Hypertension    History of acute pancreatitis 12/21/2020    History of acute pancreatitis    Low grade squamous intraepithelial lesion (LGSIL) on cervicovaginal cytologic smear 02/25/2023    Migraines     History of migraine    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    Type 2 myocardial infarction (Multi) 01/20/2024      Past Surgical History:   Procedure Laterality Date    APPENDECTOMY  03/07/2017    Appendectomy    CT ABDOMEN ANGIOGRAM W AND/OR WO IV CONTRAST  " "4/23/2023    CT ABDOMEN ANGIOGRAM W AND/OR WO IV CONTRAST [facility] CT    OTHER SURGICAL HISTORY  03/07/2017    Cystoscopy With Pyeloscopy With Removal Of Calculus    OTHER SURGICAL HISTORY  03/07/2017    Anoscopy For Polyp Removal    OTHER SURGICAL HISTORY  12/08/2021    Arteriovenous fistula creation procedure    OTHER SURGICAL HISTORY  12/08/2021    Dialysis tunneled catheter placement    TUBAL LIGATION  03/07/2017    Tubal Ligation      Family History   Problem Relation Name Age of Onset    Other (Cerebrovascular Accident) Father      Heart failure Paternal Grandmother      Breast cancer Paternal Grandmother      Other (Primary Cervical Cancer) Paternal Grandmother      Diabetes Paternal Grandfather      Breast cancer Father's Sister      Ovarian cancer Father's Sister       Social History  She reports that she has quit smoking. Her smoking use included cigarettes. She has been exposed to tobacco smoke. She has never used smokeless tobacco. She reports that she does not currently use alcohol. She reports that she does not currently use drugs after having used the following drugs: Cocaine and Marijuana.    Allergies   Allergen Reactions    Iodine Hives, Itching and Unknown    Bee Pollen Unknown    Bioflavonoids Swelling    Citrus And Derivatives Unknown    Codeine Itching, Hives and Unknown     Tolerates percocet   Tolerates percocet    Tolerates percocet    Flowers Itching    Shellfish Containing Products Swelling     SEAFOOD      Vital Signs:  BP (!) 207/87 (BP Location: Left arm, Patient Position: Lying)  Pulse 91  Temp 36.3 °C (97.3 °F) (Temporal)  Resp 17  Ht 1.397 m (4' 7\")  Wt 53.1 kg (117 lb)  SpO2 97%  BMI 27.19 kg/m²     Physical exam:  General appearance: Awake and alert, oriented ; on NC O2  HEENT: NC, no scleral icterus, moist oral mucosa  Skin: no apparent rash  Heart: RRR,  no murmurs, gallops or friction rub  Lungs: bibasilar crackles  Abdomen: soft, non tender   Extremities: trace edema  ACCESS: " [facility]    Meds:  acetaminophen, 975 mg, q8h  apixaban, 5 mg, BID  aspirin, 81 mg, Daily  atorvastatin, 80 mg, Nightly  benzonatate, 100 mg, TID  calcium acetate, 1,334 mg, TID  carvedilol, 50 mg, BID  cloNIDine, 1 patch, Weekly  dicyclomine, 10 mg, 4x daily  fluticasone, 2 spray, Daily  fluticasone furoate-vilanteroL, 1 puff, Daily  gabapentin, 300 mg, Nightly  hydrALAZINE, 100 mg, TID  isosorbide mononitrate ER, 120 mg, Daily  melatonin, 5 mg, Nightly  NIFEdipine ER, 90 mg, Daily before breakfast  pantoprazole, 40 mg, Daily before breakfast  polyethylene glycol, 17 g, Daily  sennosides-docusate sodium, 2 tablet, BID  torsemide, 20 mg, Daily  valsartan, 320 mg, q PM  vitamin B complex-vitamin C-folic acid, 1 capsule, Daily         albuterol, 2 puff, q6h PRN  dextromethorphan-guaifenesin, 5 mL, q4h PRN  diphenhydrAMINE, 25 mg, q6h PRN  hydrALAZINE, 10 mg, q8h PRN  ondansetron, 4 mg, q8h PRN   Or  ondansetron, 4 mg, q8h PRN  prochlorperazine, 10 mg, q6h PRN   Or  prochlorperazine, 10 mg, q6h PRN   Or  prochlorperazine, 25 mg, q12h PRN  traMADol, 50 mg, q12h PRN    Imaging:  XR ABDOMEN 1 VIEW;  11/2/2024 10:44 am    FINDINGS:  Nonobstructive bowel gas pattern. Limited evaluation of  pneumoperitoneum on supine imaging, however no gross evidence of free  air is noted. Gas-filled colonic loops noted. No significant colonic  stool burden. Visualized lungs are clear.      Osseous structures demonstrate no acute bony changes.      IMPRESSION:  1. Nonobstructive bowel gas pattern  2. No significant colonic stool burden.     Deonna Hawkins MD 11/02/2024 10:59      ASSESSMENT:  Patient with ESRD admitted with shortness of breath, nausea and vomiting. Nephrology consulted for dialysis care.     Impression:  ESRD   Uncontrolled HTN  Nausea and vomiting    Plan:  Dialysis today per submitted orders, fluid removal 3 liters, as tolerated  BP: - resume outpatient medications  Access: no issues   Anemia of CKD: Mircera 150  administered 10/31.  MBD - Phosphorus binder with meals and snacks  Renal diet, renal MVI  Evaluation of GI complaints per primary team      Merced Hinson MD

## 2024-11-04 NOTE — PROGRESS NOTES
Transitional Care Coordination Progress Note:  Patient discussed during interdisciplinary rounds.  Team members present: MD, AGUSTIN  Plan per Medical/Surgical team: Pt presenting with c/o shortness of breath and abdominal pain with associated nausea/vomiting likely in the setting of hypertensive crisis complicated by pulmonary edema. Per chart review pt will have scheduled dialysis, plan to wean O2 and reorder 14 day ziopatch at time of discharge.  Payer: [employer]  Status: Inpatient  Discharge disposition: Home with Medicaid Community Clinical Case Management nursing and  visits.  Potential Barriers: none  ADOD: 11/5  Care coordinator will continue to follow for discharge planning needs.     Dorothy Ramos RN  Transitional Care Coordinator (TCC)  [phone] or [phone]

## 2024-11-04 NOTE — CARE PLAN
Problem: Pain - Adult  Goal: Verbalizes/displays adequate comfort level or baseline comfort level  Outcome: Progressing     Problem: Safety - Adult  Goal: Free from fall injury  Outcome: Progressing     Problem: Discharge Planning  Goal: Discharge to home or other facility with appropriate resources  Outcome: Progressing     Problem: Chronic Conditions and Co-morbidities  Goal: Patient's chronic conditions and co-morbidity symptoms are monitored and maintained or improved  Outcome: Progressing   The patient's goals for the shift include      The clinical goals for the shift include Will maintain safety during shift

## 2024-11-04 NOTE — PROGRESS NOTES
"Stacey Heath is a 54 y.o. female on day 2 of admission presenting with Hypertensive emergency.    Subjective   Pt resting in bed. Denies sob, denies vomiting, diarrhea, fever, cough, chills, pain, chest pains, light head, dizziness, constipation , c/o nausea, cramping resolved       Objective     Physical Exam  Vitals and nursing note reviewed.   Cardiovascular:      Comments: HR 76  Pulmonary:      Comments: O2 2L NC  Mckinley lung sounds with minor crackles  Abdominal:      Tenderness: There is abdominal tenderness.      Comments: Tender to lower abd   Genitourinary:     Comments: States make urine  Musculoskeletal:      Comments: Ble without edema   Skin:     General: Skin is warm and dry.   Neurological:      Mental Status: She is alert and oriented to person, place, and time.   Psychiatric:         Mood and Affect: Mood normal.         Behavior: Behavior normal.         Last Recorded Vitals  Blood pressure 148/64, pulse 76, temperature 36.4 °C (97.5 °F), resp. rate 18, height 1.397 m (4' 7\"), weight 53.1 kg (117 lb), SpO2 100%.  Intake/Output last 3 Shifts:  I/O last 3 completed shifts:  In: 950 (17.9 mL/kg) [P.O.:950]  Out: - (0 mL/kg)   Weight: 53.1 kg     Relevant Results  Scheduled medications  acetaminophen, 975 mg, oral, q8h  apixaban, 5 mg, oral, BID  aspirin, 81 mg, oral, Daily  atorvastatin, 80 mg, oral, Nightly  benzonatate, 100 mg, oral, TID  calcium acetate, 1,334 mg, oral, TID  carvedilol, 50 mg, oral, BID  cloNIDine, 1 patch, transdermal, Weekly  dicyclomine, 10 mg, oral, 4x daily  fluticasone, 2 spray, Each Nostril, Daily  fluticasone furoate-vilanteroL, 1 puff, inhalation, Daily  gabapentin, 300 mg, oral, Nightly  hydrALAZINE, 100 mg, oral, TID  isosorbide mononitrate ER, 120 mg, oral, Daily  melatonin, 5 mg, oral, Nightly  NIFEdipine ER, 90 mg, oral, Daily before breakfast  oxygen, , inhalation, Continuous - Inhalation  pantoprazole, 40 mg, oral, Daily before breakfast  polyethylene glycol, 17 g, " oral, Daily  sennosides-docusate sodium, 2 tablet, oral, BID  torsemide, 20 mg, oral, Daily  valsartan, 320 mg, oral, q PM  vitamin B complex-vitamin C-folic acid, 1 capsule, oral, Daily      Continuous medications     PRN medications  PRN medications: albuterol, dextromethorphan-guaifenesin, diphenhydrAMINE, hydrALAZINE, ondansetron **OR** ondansetron, prochlorperazine **OR** prochlorperazine **OR** prochlorperazine, traMADol     Results for orders placed or performed during the hospital encounter of 11/01/24 (from the past 96 hours)   CBC and Auto Differential   Result Value Ref Range    WBC 6.2 4.4 - 11.3 x10*3/uL    nRBC 0.0 0.0 - 0.0 /100 WBCs    RBC 3.34 (L) 4.00 - 5.20 x10*6/uL    Hemoglobin 9.6 (L) 12.0 - 16.0 g/dL    Hematocrit 29.6 (L) 36.0 - 46.0 %    MCV 89 80 - 100 fL    MCH 28.7 26.0 - 34.0 pg    MCHC 32.4 32.0 - 36.0 g/dL    RDW 16.0 (H) 11.5 - 14.5 %    Platelets 243 150 - 450 x10*3/uL    Neutrophils % 65.6 40.0 - 80.0 %    Immature Granulocytes %, Automated 0.2 0.0 - 0.9 %    Lymphocytes % 15.4 13.0 - 44.0 %    Monocytes % 7.3 2.0 - 10.0 %    Eosinophils % 9.6 0.0 - 6.0 %    Basophils % 1.9 0.0 - 2.0 %    Neutrophils Absolute 4.05 1.20 - 7.70 x10*3/uL    Immature Granulocytes Absolute, Automated 0.01 0.00 - 0.70 x10*3/uL    Lymphocytes Absolute 0.95 (L) 1.20 - 4.80 x10*3/uL    Monocytes Absolute 0.45 0.10 - 1.00 x10*3/uL    Eosinophils Absolute 0.59 0.00 - 0.70 x10*3/uL    Basophils Absolute 0.12 (H) 0.00 - 0.10 x10*3/uL   Comprehensive metabolic panel   Result Value Ref Range    Glucose 77 74 - 99 mg/dL    Sodium 140 136 - 145 mmol/L    Potassium 5.3 3.5 - 5.3 mmol/L    Chloride 97 (L) 98 - 107 mmol/L    Bicarbonate 30 21 - 32 mmol/L    Anion Gap 18 10 - 20 mmol/L    Urea Nitrogen 35 (H) 6 - 23 mg/dL    Creatinine 8.24 (H) 0.50 - 1.05 mg/dL    eGFR 5 (L) >60 mL/min/1.73m*2    Calcium 9.8 8.6 - 10.6 mg/dL    Albumin 4.3 3.4 - 5.0 g/dL    Alkaline Phosphatase 111 (H) 33 - 110 U/L    Total Protein 7.4  6.4 - 8.2 g/dL    AST 15 9 - 39 U/L    Bilirubin, Total 0.5 0.0 - 1.2 mg/dL    ALT 14 7 - 45 U/L   B-Type Natriuretic Peptide   Result Value Ref Range    BNP 1,679 (H) 0 - 99 pg/mL   Blood Gas Venous Full Panel   Result Value Ref Range    POCT pH, Venous 7.41 7.33 - 7.43 pH    POCT pCO2, Venous 51 41 - 51 mm Hg    POCT pO2, Venous 30 (L) 35 - 45 mm Hg    POCT SO2, Venous 44 (L) 45 - 75 %    POCT Oxy Hemoglobin, Venous 43.7 (L) 45.0 - 75.0 %    POCT Hematocrit Calculated, Venous 30.0 (L) 36.0 - 46.0 %    POCT Sodium, Venous 139 136 - 145 mmol/L    POCT Potassium, Venous 5.8 (H) 3.5 - 5.3 mmol/L    POCT Chloride, Venous 97 (L) 98 - 107 mmol/L    POCT Ionized Calicum, Venous 1.25 1.10 - 1.33 mmol/L    POCT Glucose, Venous 86 74 - 99 mg/dL    POCT Lactate, Venous 1.0 0.4 - 2.0 mmol/L    POCT Base Excess, Venous 6.6 (H) -2.0 - 3.0 mmol/L    POCT HCO3 Calculated, Venous 32.3 (H) 22.0 - 26.0 mmol/L    POCT Hemoglobin, Venous 10.0 (L) 12.0 - 16.0 g/dL    POCT Anion Gap, Venous 16.0 10.0 - 25.0 mmol/L    Patient Temperature 37.0 degrees Celsius    FiO2 28 %   Troponin I, High Sensitivity, Initial   Result Value Ref Range    Troponin I, High Sensitivity (CMC) 45 (H) 0 - 34 ng/L   Light Blue Top   Result Value Ref Range    Extra Tube Hold for add-ons.    ECG 12 lead   Result Value Ref Range    Ventricular Rate 95 BPM    Atrial Rate 95 BPM    CA Interval 144 ms    QRS Duration 84 ms    QT Interval 344 ms    QTC Calculation(Bazett) 432 ms    P Axis 54 degrees    R Axis 29 degrees    T Axis 174 degrees    QRS Count 16 beats    Q Onset 218 ms    P Onset 146 ms    P Offset 193 ms    T Offset 390 ms    QTC Fredericia 400 ms   Blood Gas Arterial Full Panel Unsolicited   Result Value Ref Range    POCT pH, Arterial 7.39 7.38 - 7.42 pH    POCT pCO2, Arterial 48 (H) 38 - 42 mm Hg    POCT pO2, Arterial 43 (LL) 85 - 95 mm Hg    POCT SO2, Arterial 67 (L) 94 - 100 %    POCT Oxy Hemoglobin, Arterial 66.3 (L) 94.0 - 98.0 %    POCT Hematocrit  Calculated, Arterial 28.0 (L) 36.0 - 46.0 %    POCT Sodium, Arterial 137 136 - 145 mmol/L    POCT Potassium, Arterial 5.5 (H) 3.5 - 5.3 mmol/L    POCT Chloride, Arterial 99 98 - 107 mmol/L    POCT Ionized Calcium, Arterial 1.22 1.10 - 1.33 mmol/L    POCT Glucose, Arterial 68 (L) 74 - 99 mg/dL    POCT Lactate, Arterial 0.9 0.4 - 2.0 mmol/L    POCT Base Excess, Arterial 3.6 (H) -2.0 - 3.0 mmol/L    POCT HCO3 Calculated, Arterial 29.1 (H) 22.0 - 26.0 mmol/L    POCT Hemoglobin, Arterial 9.2 (L) 12.0 - 16.0 g/dL    POCT Anion Gap, Arterial 14 10 - 25 mmo/L    Patient Temperature 37.0 degrees Celsius   Influenza A, and B PCR   Result Value Ref Range    Flu A Result Not Detected Not Detected    Flu B Result Not Detected Not Detected   Sars-CoV-2 PCR   Result Value Ref Range    Coronavirus 2019, PCR Not Detected Not Detected   Troponin, High Sensitivity, 1 Hour   Result Value Ref Range    Troponin I, High Sensitivity (CMC) 42 (H) 0 - 34 ng/L   POCT GLUCOSE   Result Value Ref Range    POCT Glucose 99 74 - 99 mg/dL   CBC   Result Value Ref Range    WBC 8.3 4.4 - 11.3 x10*3/uL    nRBC 0.0 0.0 - 0.0 /100 WBCs    RBC 3.13 (L) 4.00 - 5.20 x10*6/uL    Hemoglobin 9.1 (L) 12.0 - 16.0 g/dL    Hematocrit 30.7 (L) 36.0 - 46.0 %    MCV 98 80 - 100 fL    MCH 29.1 26.0 - 34.0 pg    MCHC 29.6 (L) 32.0 - 36.0 g/dL    RDW 16.1 (H) 11.5 - 14.5 %    Platelets 236 150 - 450 x10*3/uL   Renal Function Panel   Result Value Ref Range    Glucose 80 74 - 99 mg/dL    Sodium 140 136 - 145 mmol/L    Potassium 5.2 3.5 - 5.3 mmol/L    Chloride 98 98 - 107 mmol/L    Bicarbonate 29 21 - 32 mmol/L    Anion Gap 18 10 - 20 mmol/L    Urea Nitrogen 28 (H) 6 - 23 mg/dL    Creatinine 7.55 (H) 0.50 - 1.05 mg/dL    eGFR 6 (L) >60 mL/min/1.73m*2    Calcium 9.1 8.6 - 10.6 mg/dL    Phosphorus 6.1 (H) 2.5 - 4.9 mg/dL    Albumin 3.8 3.4 - 5.0 g/dL   Magnesium   Result Value Ref Range    Magnesium 2.29 1.60 - 2.40 mg/dL     *Note: Due to a large number of results and/or  encounters for the requested time period, some results have not been displayed. A complete set of results can be found in Results Review.                           Assessment/Plan   Assessment & Plan  Hypertensive emergency    Did not Tolerate hemodialysis Saturday with net fluid loss 1663 ml    Bp at beginning of tx 207/77 with drop to 85/58 (pt with cramping, dizziness, uf off-pt request term of tx) bp at term of tx 153/74 , euvolemic on exam and has stable electrolytes      Outpatient Dialysis schedule:   TTS [facility] [place] [facility]/Dr Barraza      Access: rt fistula with +thrill/bruit- no issues - able to achieve      Anemia of ESRD:  11/4-epogen 5000 units on dialysis days.. current hgb 9.1.. will cont to monitor.. will cont to monitor.. (Mircera 150mmcg q2wks op)      CKD-MBD Phosphate Binder:calcium acetate (Phoslo) capsule 1,334 mg tid ac, vitamin B complex-vitamin C-folic acid (Nephrocaps) capsule 1 capsule daily      Plan HD tomorrow with UF as tolerated     Renal diet      Please obtain daily standing wt (if possible)     Medication to be adjusted for ESRD      Patient to continue regular HD schedule while inpatient and to follow with the outpatient nephrologist at discharge              LILLY Rebollar-CNP

## 2024-11-05 ENCOUNTER — APPOINTMENT (OUTPATIENT)
Dept: DIALYSIS | Facility: HOSPITAL | Age: 54
End: 2024-11-05
Payer: COMMERCIAL

## 2024-11-05 VITALS
HEART RATE: 82 BPM | TEMPERATURE: 98.4 F | WEIGHT: 117 LBS | HEIGHT: 55 IN | DIASTOLIC BLOOD PRESSURE: 77 MMHG | OXYGEN SATURATION: 97 % | SYSTOLIC BLOOD PRESSURE: 172 MMHG | BODY MASS INDEX: 27.08 KG/M2 | RESPIRATION RATE: 17 BRPM

## 2024-11-05 LAB — GLUCOSE BLD MANUAL STRIP-MCNC: 87 MG/DL (ref 74–99)

## 2024-11-05 PROCEDURE — 2500000001 HC RX 250 WO HCPCS SELF ADMINISTERED DRUGS (ALT 637 FOR MEDICARE OP): Performed by: STUDENT IN AN ORGANIZED HEALTH CARE EDUCATION/TRAINING PROGRAM

## 2024-11-05 PROCEDURE — 99239 HOSP IP/OBS DSCHRG MGMT >30: CPT | Performed by: INTERNAL MEDICINE

## 2024-11-05 PROCEDURE — 5A1D70Z PERFORMANCE OF URINARY FILTRATION, INTERMITTENT, LESS THAN 6 HOURS PER DAY: ICD-10-PCS | Performed by: INTERNAL MEDICINE

## 2024-11-05 PROCEDURE — 90935 HEMODIALYSIS ONE EVALUATION: CPT | Performed by: INTERNAL MEDICINE

## 2024-11-05 PROCEDURE — 82947 ASSAY GLUCOSE BLOOD QUANT: CPT

## 2024-11-05 PROCEDURE — 2500000002 HC RX 250 W HCPCS SELF ADMINISTERED DRUGS (ALT 637 FOR MEDICARE OP, ALT 636 FOR OP/ED): Performed by: STUDENT IN AN ORGANIZED HEALTH CARE EDUCATION/TRAINING PROGRAM

## 2024-11-05 PROCEDURE — 2500000001 HC RX 250 WO HCPCS SELF ADMINISTERED DRUGS (ALT 637 FOR MEDICARE OP): Performed by: INTERNAL MEDICINE

## 2024-11-05 RX ORDER — HYDRALAZINE HYDROCHLORIDE 50 MG/1
100 TABLET, FILM COATED ORAL 3 TIMES DAILY
Status: DISCONTINUED | OUTPATIENT
Start: 2024-11-05 | End: 2024-11-05 | Stop reason: HOSPADM

## 2024-11-05 RX ADMIN — RENO CAPS 1 CAPSULE: 100; 1.5; 1.7; 20; 10; 1; 150; 5; 6 CAPSULE ORAL at 11:14

## 2024-11-05 RX ADMIN — CALCIUM ACETATE 1334 MG: 667 CAPSULE ORAL at 11:12

## 2024-11-05 RX ADMIN — HYDRALAZINE HYDROCHLORIDE 100 MG: 50 TABLET ORAL at 16:53

## 2024-11-05 RX ADMIN — CALCIUM ACETATE 1334 MG: 667 CAPSULE ORAL at 16:33

## 2024-11-05 RX ADMIN — DICYCLOMINE HYDROCHLORIDE 10 MG: 10 CAPSULE ORAL at 05:18

## 2024-11-05 RX ADMIN — APIXABAN 5 MG: 5 TABLET, FILM COATED ORAL at 11:13

## 2024-11-05 RX ADMIN — BENZONATATE 100 MG: 100 CAPSULE ORAL at 16:33

## 2024-11-05 RX ADMIN — ACETAMINOPHEN 975 MG: 325 TABLET, FILM COATED ORAL at 11:12

## 2024-11-05 RX ADMIN — DICYCLOMINE HYDROCHLORIDE 10 MG: 10 CAPSULE ORAL at 16:33

## 2024-11-05 RX ADMIN — FLUTICASONE PROPIONATE 2 SPRAY: 50 SPRAY, METERED NASAL at 11:17

## 2024-11-05 RX ADMIN — TORSEMIDE 20 MG: 20 TABLET ORAL at 11:12

## 2024-11-05 RX ADMIN — ASPIRIN 81 MG: 81 TABLET, COATED ORAL at 11:13

## 2024-11-05 RX ADMIN — DIPHENHYDRAMINE HYDROCHLORIDE 25 MG: 25 CAPSULE ORAL at 16:38

## 2024-11-05 RX ADMIN — PANTOPRAZOLE SODIUM 40 MG: 40 TABLET, DELAYED RELEASE ORAL at 05:18

## 2024-11-05 RX ADMIN — CARVEDILOL 50 MG: 12.5 TABLET, FILM COATED ORAL at 11:14

## 2024-11-05 RX ADMIN — TRAMADOL HYDROCHLORIDE 50 MG: 50 TABLET, COATED ORAL at 11:25

## 2024-11-05 RX ADMIN — NIFEDIPINE 90 MG: 90 TABLET, FILM COATED, EXTENDED RELEASE ORAL at 11:12

## 2024-11-05 RX ADMIN — ISOSORBIDE MONONITRATE 120 MG: 30 TABLET, EXTENDED RELEASE ORAL at 11:25

## 2024-11-05 RX ADMIN — ACETAMINOPHEN 975 MG: 325 TABLET, FILM COATED ORAL at 00:19

## 2024-11-05 RX ADMIN — BENZONATATE 100 MG: 100 CAPSULE ORAL at 11:11

## 2024-11-05 ASSESSMENT — COGNITIVE AND FUNCTIONAL STATUS - GENERAL
MOBILITY SCORE: 24
DAILY ACTIVITIY SCORE: 24

## 2024-11-05 ASSESSMENT — PAIN SCALES - GENERAL
PAINLEVEL_OUTOF10: 0 - NO PAIN
PAINLEVEL_OUTOF10: 6
PAINLEVEL_OUTOF10: 7

## 2024-11-05 ASSESSMENT — PAIN DESCRIPTION - LOCATION
LOCATION: ABDOMEN
LOCATION: ABDOMEN

## 2024-11-05 ASSESSMENT — PAIN DESCRIPTION - ORIENTATION
ORIENTATION: LOWER
ORIENTATION: LOWER

## 2024-11-05 ASSESSMENT — PAIN - FUNCTIONAL ASSESSMENT: PAIN_FUNCTIONAL_ASSESSMENT: NO/DENIES PAIN

## 2024-11-05 NOTE — RESEARCH NOTES
Artificial Intelligence Monitoring in Nursing (AIMS Nursing) Study    Principle Investigator - Dr. Feliciano Wallace  Research Coordinator - Ellen Allen     Patient Name - Stacey Heath  Date - 11/5/2024 11:13 AM  Location - [place] [address]    Stacey Heath was approached by Ellen Allen to talk about participating in the AIMS Nursing Study. The patient declined to participate in the study. Study protocol was followed and patient was given study contact information.     Ellen Allen

## 2024-11-05 NOTE — PROCEDURES
Seen on HD  No complaints  Did not tolerate UF well- net UF 1.4 L.  Questionable compliance with anti-hypertensives at home, repeated admissions with uncontrolled htn with rapid bottoming of BP in-hospital on resumption of home meds. Also does not tolerate rapid UF, will increase duration of HD to 3 hrs 15 min for better UF tolerability  Next HD on 11/7/24 11/4/2024     3:57 PM 11/4/2024     6:25 PM 11/5/2024    12:43 AM 11/5/2024     4:33 AM 11/5/2024     6:53 AM 11/5/2024    10:00 AM 11/5/2024    10:38 AM   Vitals   Systolic 144 154 171 169   182   Diastolic 62 60 70 73   107   Heart Rate 77 84 80 73 75 75 80   Temp 36.6 °C (97.9 °F) 36.5 °C (97.7 °F) 36.7 °C (98.1 °F) 36.6 °C (97.9 °F) 36.5 °C (97.7 °F) 36.2 °C (97.2 °F) 1 °C (33.8 °F)   Resp 16 17 16 16   16     No distress  HEENT:  moist, no pallor  No edema dar LE  Breath sounds dar equal, clear  S1 S2 regular, normal, no rub or murmur  Abdomen soft  AAO x3, non focal    Results from last 72 hours   Lab Units 11/03/24  1125   SODIUM mmol/L 140   POTASSIUM mmol/L 5.2   CHLORIDE mmol/L 98   CO2 mmol/L 29   BUN mg/dL 28*   PHOSPHORUS mg/dL 6.1*   HEMOGLOBIN g/dL 9.1*       acetaminophen, 975 mg, oral, q8h  apixaban, 5 mg, oral, BID  aspirin, 81 mg, oral, Daily  atorvastatin, 80 mg, oral, Nightly  benzonatate, 100 mg, oral, TID  calcium acetate, 1,334 mg, oral, TID  carvedilol, 50 mg, oral, BID  cloNIDine, 1 patch, transdermal, Weekly  dicyclomine, 10 mg, oral, 4x daily  epoetin joceline or biosimilar, 5,000 Units, intravenous, Once per day on Tuesday Thursday Saturday  fluticasone, 2 spray, Each Nostril, Daily  fluticasone furoate-vilanteroL, 1 puff, inhalation, Daily  gabapentin, 300 mg, oral, Nightly  [Held by provider] hydrALAZINE, 100 mg, oral, TID  isosorbide mononitrate ER, 120 mg, oral, Daily  melatonin, 5 mg, oral, Nightly  NIFEdipine ER, 90 mg, oral, Daily before breakfast  oxygen, , inhalation, Continuous - Inhalation  pantoprazole, 40 mg, oral, Daily  before breakfast  polyethylene glycol, 17 g, oral, Daily  sennosides-docusate sodium, 2 tablet, oral, BID  torsemide, 20 mg, oral, Daily  valsartan, 320 mg, oral, q PM  vitamin B complex-vitamin C-folic acid, 1 capsule, oral, Daily

## 2024-11-05 NOTE — NURSING NOTE
Report to Receiving RN:    Report To:  Lorena ( RN)  Time Report Called: 1025 am  Hand-Off Communication: post HD tx vs 170/71 HR 75; fluid removed 1.5 liters.  Complications During Treatment: No  Ultrafiltration Treatment: No  Medications Administered During Dialysis: No  Blood Products Administered During Dialysis: No  Labs Sent During Dialysis: No  Heparin Drip Rate Changes: No  Dialysis Catheter Dressing: fistula right  Last Dressing Change: yes    Electronic Signatures:   (Signed Cassidy Jenkins RN.)   Authored:    (Signed )   Authored:     Last Updated: 10:28 AM by CASSIDY JENKINS

## 2024-11-05 NOTE — PROGRESS NOTES
INTERNAL MEDICINE PROGRESS NOTE     SUBJECTIVE     Pt was seen and examined at beside. No acute events overnight.  Continues to have abdominal pain reports no improvement since presentation. Reports minimal urine output but no urinary symptms otherwise so will get a UA and CT     OBJECTIVE      Visit Vitals  BP (!) 182/107   Pulse 80   Temp (!) 1 °C (33.8 °F)   Resp 16        Intake/Output Summary (Last 24 hours) at 11/5/2024 1457  Last data filed at 11/5/2024 1000  Gross per 24 hour   Intake 1480 ml   Output 1459 ml   Net 21 ml       Physical Exam   General Appearance: Patient is awake, alert and cooperative, and appears to be in no acute distress.  Head: NCAT  Eyes: PERRL, EOMI. vision is grossly intact.  Neck: No JVD  Cardiac: Normal S1 and S2. No murmurs. Rhythm is regular.  Lungs: Diminished lung sounds with bibasilar crackles.  Abdomen: Positive bowel sounds. Soft, nondistended, slightly tender in hypogastric area. No guarding or rebound. No masses.  Musculoskeletal: Range of motion and strength intact in extremities.  Extremities: Warm well-perfused peripheral pulses intact, trace bilateral lower extremity edema.   Neuro: CN II-XII grossly intact.   Skin: Skin normal color, texture and turgor with no lesions or eruptions.  Psych:  appropriate mood and affect    Current Meds   acetaminophen, 975 mg, oral, q8h  apixaban, 5 mg, oral, BID  aspirin, 81 mg, oral, Daily  atorvastatin, 80 mg, oral, Nightly  benzonatate, 100 mg, oral, TID  calcium acetate, 1,334 mg, oral, TID  carvedilol, 50 mg, oral, BID  cloNIDine, 1 patch, transdermal, Weekly  dicyclomine, 10 mg, oral, 4x daily  epoetin joceline or biosimilar, 5,000 Units, intravenous,  Once per day on Tuesday Thursday Saturday  fluticasone, 2 spray, Each Nostril, Daily  fluticasone furoate-vilanteroL, 1 puff, inhalation, Daily  gabapentin, 300 mg, oral, Nightly  [Held by provider] hydrALAZINE, 100 mg, oral, TID  isosorbide mononitrate ER, 120 mg, oral, Daily  melatonin, 5 mg, oral, Nightly  NIFEdipine ER, 90 mg, oral, Daily before breakfast  oxygen, , inhalation, Continuous - Inhalation  pantoprazole, 40 mg, oral, Daily before breakfast  polyethylene glycol, 17 g, oral, Daily  sennosides-docusate sodium, 2 tablet, oral, BID  torsemide, 20 mg, oral, Daily  valsartan, 320 mg, oral, q PM  vitamin B complex-vitamin C-folic acid, 1 capsule, oral, Daily       PRN medications: albuterol, dextromethorphan-guaifenesin, diphenhydrAMINE, hydrALAZINE, ondansetron **OR** ondansetron, prochlorperazine **OR** prochlorperazine **OR** prochlorperazine, traMADol     LABS and IMAGING     WBC   Date Value Ref Range Status   11/03/2024 8.3 4.4 - 11.3 x10*3/uL Final     Hemoglobin   Date Value Ref Range Status   11/03/2024 9.1 (L) 12.0 - 16.0 g/dL Final     Hematocrit   Date Value Ref Range Status   11/03/2024 30.7 (L) 36.0 - 46.0 % Final     Bicarbonate   Date Value Ref Range Status   11/03/2024 29 21 - 32 mmol/L Final     Creatinine   Date Value Ref Range Status   11/03/2024 7.55 (H) 0.50 - 1.05 mg/dL Final     Calcium   Date Value Ref Range Status   11/03/2024 9.1 8.6 - 10.6 mg/dL Final       XR abdomen 1 view  Narrative: Interpreted By:  Deonna Hawkins,   STUDY:  XR ABDOMEN 1 VIEW;  11/2/2024 10:44 am      INDICATION:  Signs/Symptoms:abdominal pain; N/V.      COMPARISON:  None.      ACCESSION NUMBER(S):  [id number]      ORDERING CLINICIAN:  AYDIN ALICEA      FINDINGS:  Nonobstructive bowel gas pattern. Limited evaluation of  pneumoperitoneum on supine imaging, however no gross evidence of free  air is noted. Gas-filled colonic loops noted. No significant colonic  stool burden. Visualized lungs are  clear.      Osseous structures demonstrate no acute bony changes.      Impression: 1. Nonobstructive bowel gas pattern  2. No significant colonic stool burden.      MACRO:  None      Signed by: Deonna Hawkins 11/2/2024 10:59 AM  Dictation workstation:   [id number]     ASSESSMENT / PLANS    Stacey Heath is a 54 y.o. female with PMH significant for ESRD 2/2 on dialysis T/Th/Sat via RUE AVF (last complete session 10/31/2024), HTN, HFpEF, COPD, brachial DVT on Eliquis who presented for [facility] ED with complaints of shortness of breath and abdominal pain with associated nausea/vomiting likely in the setting of hypertensive crisis complicated by pulmonary edema.  Will admit to medicine for further management. Pt also with runny nose, cough, and congestion, likely also component of viral URI contributing to this.      #Hypertensive crisis  ::No acute red flag symptoms noted upon evaluation no hypertensive encephalopathy, focal neurological deficits altered mental status no seizures, headaches, vision changes no chest pain  -Placed on cardiac telemetry, monitor for any alarms or events  -Continue home carvedilol 50 mg twice daily, hydralazine 100 mg 3 times daily, isosorbide mononitrate 120 mg daily, nifedipine 90 mg daily, torsemide 20 mg daily, valsartan 320 mg daily, clonidine patch weekly   -Supplemental oxygenation to maintain SpO2 >90%  -dialysis consulted and following; on dialysis days can give BP meds after dialysis to stop pt from dropping BP during HD sessions     #Abdominal pain   #Nausea and Vomiting   ::KUB unremarkable, no inflammation or stool burden  -Antiemetics ordered as needed for nausea and vomiting  -unclear etiology, pain mainly in lower quadrants so will obtain a CT scan. UA also ordered, oliguric. Review of prior records she has had abdominal pain thought to be related to her IBS however patient reports never had this pain before. Appears comfortable though during evaluation and eager to be  discharged..     #Elevated troponin  #Elevated BNP  ::Troponins elevated 45->42  ::BNP elevated 1679  ::Both are chronically elevated in this patient  ::Likely multifactorial in the setting of hypertensive crisis and ESRD  ::Low clinical index of suspicion for ACS process or acute decompensated heart failure  -No need to trend at this time, monitor symptoms, evaluate and escalate care as needed     #ESRD on HD  ::Last full session on 10/31/2024  ::Chest x-ray with pulmonary edema, unchanged from previous imaging  -nephrology following for HD   -Renal dosing of medications, renal multivitamin  -Continue home PhosLo 2 tabs with meals 3 times daily     #Normocytic anemia  -Hemoglobin 9.6 today, currently around her baseline  -Likely in the setting of chronic disease [renal]  -No evidence of acute bleeding, monitor and escalate care as needed     #T2DM  -Last hemoglobin A1c 5.2% June 2024  -Not on any antidiabetic regimen at home  -POCT glucose as needed  -Continue gabapentin for peripheral neuropathy     #COPD  -No concerns for acute COPD exacerbation, no wheezing on exam  -Her shortness of breath better explained by her pulmonary edema  -Continue home Breo Ellipta 1 puff daily, Flonase 2 sprays daily and albuterol MDI every 6 hours as needed for shortness of breath  -currently on 2L, no home O2 requirement, will wean as tolerated      #GERD  -Continue home PPI     #History of DVT  -Continue home apixaban     #HLD  -Continue home statin    #Hx of intermittent palpitations   -ordered 14 day ziopatch on last admission; has fallen off  -will reorder at discharge   -has appt with cardiologist on 11/12; may need to push back this appointment     #Dispo: can likely discharge on 11/5      F: None  E: Replete as needed  N: Renal  GI: PPI  DVT: Apixaban  CODE STATUS: Full code              Katerin Mejia MD

## 2024-11-05 NOTE — NURSING NOTE
Discharge Note:    Pt A&O x 4 at time of discharge with family at bedside. Reviewed discharge instructions, and follow up appointments with pt who verbalized understanding with no further questions. IV removed by bedside RN. Pt in possession of all belongings at time of discharge. Pt to be transported home via family. Pt resting comfortably at time of discharge. Declined transport, pt states she wants to walk out of hospital.

## 2024-11-05 NOTE — CARE PLAN
Problem: Pain - Adult  Goal: Verbalizes/displays adequate comfort level or baseline comfort level  Outcome: Progressing     Problem: Safety - Adult  Goal: Free from fall injury  Outcome: Progressing     Problem: Discharge Planning  Goal: Discharge to home or other facility with appropriate resources  Outcome: Progressing     Problem: Chronic Conditions and Co-morbidities  Goal: Patient's chronic conditions and co-morbidity symptoms are monitored and maintained or improved  Outcome: Progressing   The patient's goals for the shift include      The clinical goals for the shift include Will have pain controlled during shift

## 2024-11-05 NOTE — DISCHARGE INSTRUCTIONS
If your abdominal pain recurs please return to the ED. As discussed I had planned a CT scan however you have opted to be discharged instead since pain has resolved. Please follow up with your PCP.

## 2024-11-05 NOTE — NURSING NOTE
Report from Sending RN:    Report From: Madi ( RN)  Recent Surgery of Procedure: No  Baseline Level of Consciousness (LOC): a/o x 4  Oxygen Use: No  Type: none  Diabetic: Yes, 99  Last BP Med Given Day of Dialysis: none  Last Pain Med Given: none  Lab Tests to be Obtained with Dialysis: No  Blood Transfusion to be Given During Dialysis: No  Available IV Access: No  Medications to be Administered During Dialysis: No  Continuous IV Infusion Running: No  Restraints on Currently or in the Last 24 Hours: No  Hand-Off Communication: No acute overnight or morning events; vss; Pt did take morning medications; Pt will not need labs; Pt is a full code; Cassidy Gonzalez RN.  Dialysis Catheter Dressing: fistula right   Last Dressing Change: yes

## 2024-11-05 NOTE — DISCHARGE SUMMARY
Discharge Diagnosis  Hypertensive emergency    Issues Requiring Follow-Up  Follow up with PCP     Discharge Meds     Medication List      CONTINUE taking these medications     * albuterol 90 mcg/actuation inhaler; Commonly known as: ProAir HFA;   Inhale 2 puffs every 4 hours if needed for wheezing or shortness of   breath.   * albuterol 1.25 mg/3 mL nebulizer solution; Take 3 mL (1.25 mg) by   nebulization every 6 hours if needed for wheezing or shortness of breath.   aspirin 81 mg EC tablet; Take 1 tablet (81 mg) by mouth once daily.   atorvastatin 80 mg tablet; Commonly known as: Lipitor; Take 1 tablet (80   mg) by mouth once daily at bedtime.   Breo Ellipta 100-25 mcg/dose inhaler; Generic drug: fluticasone   furoate-vilanteroL; Inhale 1 puff once daily.   calcium acetate 667 mg capsule; Commonly known as: Phoslo; Take 2   capsules (1,334 mg) by mouth 3 times daily (morning, midday, late   afternoon).   carvedilol 25 mg tablet; Commonly known as: Coreg; Take 2 tablets (50   mg) by mouth 2 times a day.   cloNIDine 0.2 mg/24 hr patch; Commonly known as: Catapres-TTS; Apply one   patch on the skin and replace every 7 days, as directed. Do not start   before October 31, 2024.   Eliquis 5 mg tablet; Generic drug: apixaban; Take 1 tablet (5 mg) by   mouth 2 times a day.   epoetin joceline 10,000 unit/mL injection; Commonly known as:   Epogen,Procrit; Inject 1 mL (10,000 Units) under the skin 3 times a week.   fluticasone 50 mcg/actuation nasal spray; Commonly known as: Flonase;   Administer 2 sprays into each nostril once daily. Shake gently. Before   first use, prime pump. After use, clean tip and replace cap.   gabapentin 300 mg capsule; Commonly known as: Neurontin; Take 1 capsule   (300 mg) by mouth once daily at bedtime.   hydrALAZINE 100 mg tablet; Commonly known as: Apresoline; Take 1 tablet   (100 mg) by mouth 3 times a day.   hydrOXYzine HCL 25 mg tablet; Commonly known as: Atarax; Take 1 tablet   (25 mg) by mouth  every 6 hours if needed for anxiety, allergies or   itching.   isosorbide mononitrate  mg 24 hr tablet; Commonly known as: Imdur;   Take 1 tablet (120 mg) by mouth once daily. Do not crush or chew.   melatonin 5 mg tablet; Take 1 tablet (5 mg) by mouth once daily at   bedtime.   NIFEdipine ER 90 mg 24 hr tablet; Commonly known as: Adalat CC; Take 1   tablet (90 mg) by mouth once daily in the morning. Take before meals. Do   not crush, chew, or split.   pantoprazole 40 mg EC tablet; Commonly known as: ProtoNix; Take 1 tablet   (40 mg) by mouth once daily in the morning. Take before meals. Do not   crush, chew, or split.   polyethylene glycol 17 gram/dose powder; Commonly known as: Glycolax,   Miralax; Take 17 g (1 scoop dissolved in liquid) by mouth once daily. Do   not start before July 26, 2024.   Renal Caps 1 mg capsule; Generic drug: vitamin B complex-vitamin C-folic   acid; Take 1 capsule by mouth once daily.   SUMAtriptan 25 mg tablet; Commonly known as: Imitrex; Take 1 tablet (25   mg) by mouth 1 time if needed for migraine. May repeat dose once in 2   hours if no relief.  Do not exceed 2 doses in 24 hours.   torsemide 20 mg tablet; Commonly known as: Demadex; Take 2 tablets once   daily on non-dialysis days only. Do not take on dialysis days   valsartan 320 mg tablet; Commonly known as: Diovan; Take 1 tablet (320   mg) by mouth once daily in the evening.  * This list has 2 medication(s) that are the same as other medications   prescribed for you. Read the directions carefully, and ask your doctor or   other care provider to review them with you.       Test Results Pending At Discharge  Pending Labs       No current pending labs.            Hospital Course   Stacey Heath is a 54 y.o. 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 This started happening yesterday morning prompting her to come to the emergency department likely in the setting of hypertensive crisis complicated by pulmonary edema.  Will admit to medicine for further management. Pt also with runny nose, cough, and congestion, likely also component of viral URI contributing to this. Her blood pressure improved during hospitalization. Received HD inpatient. Tropinemia believed to be due to ESRD and BP. She also had reported abdominal pain, n/v, has hx of IBS and abdominal xray with no findings. Initial plan was to dc today however when I saw her she reported abdominal pain initially and we agreed on CT abdomen as she had reported pain has not improved at all since admission however through out the day kept asking to be discharged, now informs me she doesn't have pain and doesn't want imaging and wants discharge orders. Doesn't want to wait for new ziopatch however per discussions with Ziop[employer] team will deliver one to her tomorrow.        Pertinent Physical Exam At Time of Discharge  Physical Exam    Alert and oriented x3  Chest good air entry  Cardiac s1 and s2  Abdomen soft nondistended positive bowl sounds  No LE edema    Outpatient Follow-Up  Future Appointments   Date Time Provider Department Center   11/6/2024 11:00 AM LILLY Leahy-CNP [id number] [facility]   11/8/2024 11:00 AM Sherri Gastelum MD [id number] Academic   11/12/2024  4:00 PM Kacey Garzon MD [id number] Academic   12/9/2024  1:40 PM Orlin Westbrook MD [id number] Academic   1/17/2025 11:40 AM Judy Alcaraz MD [id number] Academic   1/27/2025  4:00 PM Makenna Barraza MD [id number] Academic     Time spent on evaluating and discharging patient 45 mins    Katerin Mejia MD

## 2024-11-06 ENCOUNTER — DOCUMENTATION (OUTPATIENT)
Dept: BEHAVIORAL HEALTH | Facility: CLINIC | Age: 54
End: 2024-11-06

## 2024-11-06 ENCOUNTER — OFFICE VISIT (OUTPATIENT)
Dept: SLEEP MEDICINE | Facility: HOSPITAL | Age: 54
End: 2024-11-06
Payer: COMMERCIAL

## 2024-11-06 VITALS
DIASTOLIC BLOOD PRESSURE: 68 MMHG | BODY MASS INDEX: 24.17 KG/M2 | WEIGHT: 104 LBS | HEART RATE: 90 BPM | TEMPERATURE: 97.7 F | OXYGEN SATURATION: 92 % | SYSTOLIC BLOOD PRESSURE: 196 MMHG

## 2024-11-06 DIAGNOSIS — E11.9 TYPE 2 DIABETES MELLITUS WITHOUT COMPLICATION, WITH LONG-TERM CURRENT USE OF INSULIN (MULTI): ICD-10-CM

## 2024-11-06 DIAGNOSIS — G47.33 OSA (OBSTRUCTIVE SLEEP APNEA): ICD-10-CM

## 2024-11-06 DIAGNOSIS — Z79.4 TYPE 2 DIABETES MELLITUS WITHOUT COMPLICATION, WITH LONG-TERM CURRENT USE OF INSULIN (MULTI): ICD-10-CM

## 2024-11-06 DIAGNOSIS — I1A.0 RESISTANT HYPERTENSION: ICD-10-CM

## 2024-11-06 DIAGNOSIS — F41.9 ANXIETY: ICD-10-CM

## 2024-11-06 DIAGNOSIS — G47.30 SLEEP-RELATED BREATHING DISORDER: Primary | ICD-10-CM

## 2024-11-06 DIAGNOSIS — I82.722: ICD-10-CM

## 2024-11-06 PROBLEM — F17.200 NICOTINE USE DISORDER: Status: RESOLVED | Noted: 2020-03-27 | Resolved: 2024-11-06

## 2024-11-06 PROCEDURE — 1036F TOBACCO NON-USER: CPT

## 2024-11-06 PROCEDURE — 4010F ACE/ARB THERAPY RXD/TAKEN: CPT

## 2024-11-06 PROCEDURE — 3044F HG A1C LEVEL LT 7.0%: CPT

## 2024-11-06 PROCEDURE — 99205 OFFICE O/P NEW HI 60 MIN: CPT

## 2024-11-06 PROCEDURE — 3048F LDL-C <100 MG/DL: CPT

## 2024-11-06 PROCEDURE — 3078F DIAST BP <80 MM HG: CPT

## 2024-11-06 PROCEDURE — 3077F SYST BP >= 140 MM HG: CPT

## 2024-11-06 PROCEDURE — 99215 OFFICE O/P EST HI 40 MIN: CPT

## 2024-11-06 RX ORDER — PANTOPRAZOLE SODIUM 40 MG/1
40 TABLET, DELAYED RELEASE ORAL AS NEEDED
Qty: 30 TABLET | Refills: 0 | Status: ON HOLD | OUTPATIENT
Start: 2024-11-06 | End: 2024-12-06

## 2024-11-06 ASSESSMENT — SLEEP AND FATIGUE QUESTIONNAIRES
HOW LIKELY ARE YOU TO NOD OFF OR FALL ASLEEP WHILE LYING DOWN TO REST IN THE AFTERNOON WHEN CIRCUMSTANCES PERMIT: HIGH CHANCE OF DOZING
HOW LIKELY ARE YOU TO NOD OFF OR FALL ASLEEP WHEN YOU ARE A PASSENGER IN A CAR FOR AN HOUR WITHOUT A BREAK: HIGH CHANCE OF DOZING
DIFFICULTY_FALLING_ASLEEP: MODERATE
SLEEP_PROBLEM_INTERFERES_DAILY_ACTIVITIES: SOMEWHAT
SITING INACTIVE IN A PUBLIC PLACE LIKE A CLASS ROOM OR A MOVIE THEATER: HIGH CHANCE OF DOZING
HOW LIKELY ARE YOU TO NOD OFF OR FALL ASLEEP WHILE WATCHING TV: HIGH CHANCE OF DOZING
WORRIED_DISTRESSED_DUE_TO_SLEEP: SOMEWHAT
HOW LIKELY ARE YOU TO NOD OFF OR FALL ASLEEP IN A CAR, WHILE STOPPED FOR A FEW MINUTES IN TRAFFIC: MODERATE CHANCE OF DOZING
HOW LIKELY ARE YOU TO NOD OFF OR FALL ASLEEP WHILE SITTING AND TALKING TO SOMEONE: HIGH CHANCE OF DOZING
SLEEP_PROBLEM_NOTICEABLE_TO_OTHERS: MUCH
WAKING_TOO_EARLY: VERY SEVERE
DIFFICULTY_STAYING_ASLEEP: VERY SEVERE
SATISFACTION_WITH_CURRENT_SLEEP_PATTERN: DISSATISFIED
ESS-CHAD TOTAL SCORE: 23
HOW LIKELY ARE YOU TO NOD OFF OR FALL ASLEEP WHILE SITTING AND READING: HIGH CHANCE OF DOZING
HOW LIKELY ARE YOU TO NOD OFF OR FALL ASLEEP WHILE SITTING QUIETLY AFTER LUNCH WITHOUT ALCOHOL: HIGH CHANCE OF DOZING

## 2024-11-06 ASSESSMENT — LIFESTYLE VARIABLES
SKIP TO QUESTIONS 9-10: 1
HOW MANY STANDARD DRINKS CONTAINING ALCOHOL DO YOU HAVE ON A TYPICAL DAY: PATIENT DOES NOT DRINK
HOW OFTEN DO YOU HAVE SIX OR MORE DRINKS ON ONE OCCASION: NEVER
HOW OFTEN DO YOU HAVE A DRINK CONTAINING ALCOHOL: NEVER
AUDIT-C TOTAL SCORE: 0

## 2024-11-06 ASSESSMENT — PAIN SCALES - GENERAL: PAINLEVEL_OUTOF10: 0-NO PAIN

## 2024-11-06 NOTE — PATIENT INSTRUCTIONS
[facility]  [facility]  [address]  [facility] [address] [phone]       Thank you for coming to the Sleep Medicine Clinic today! Your sleep medicine provider today was: YONIS Leahy Below is a summary of your treatment plan, patient education, other important information, and our contact numbers.    Dear Ms Stacey Heath       Your Sleep Provider Today: YONIS Leahy  Your Primary Care Physician: Sherri Gastelum MD   Your Referring Provider: Lyndon Tubbs MD    Thank you for visiting  Sleep Medicine Clinic !     1. We will proceed with an IN-CENTER sleep study to check your risk of sleep apnea. The lab will call you and schedule it for you.     2. Please do not drive when you are sleepy and  start practicing the sleep hygiene as discussed in clinic.    3. Please start using Biotene to ease your dry mouth if needed.    4.Your blood pressure was elevated in the office today. Please obtain a home blood pressure monitoring kit, log your blood pressure at home, and follow up with your primary care physician. To control your blood pressure better, please take anti-hypertension medication at bedtime and a water pill at waking time.    5.  FOR QUESTIONS AND CONCERNS:   a) : To schedule, cancel, or reschedule SLEEP STUDY appointments, please call [phone]  b): Please call my office with issues or questions: [phone] ([place]); [phone] ([facility]); [phone] ([place])    If you have a CPAP or BiPAP machine at home, please bring the unit and all accessories including the power cord to your appointments unless I tell you otherwise. Please have knowledge of the DME company you worked with to receive your PAP device. If you have copies of any previous sleep testing completed outside of , please bring with you to clinic as well. This information will make our visits more productive.     If you  are new to CPAP or BiPAP, please note the minimum usage insurance requires to continuing coverage for the equipment as noted by your DME company. Please discuss equipment issues (PAP unit, mask fit, humidification, etc.) with your DME company first.       In the event that you are running more than 10 minutes late to your appointment, I will kindly ask you to reschedule. Thanks.      TREATMENT PLAN     Follow-up Appointment:   Follow-up in 2-3 weeks after sleep study.    PATIENT EDUCATION     OBSTRUCTIVE SLEEP APNEA (JAMSHID) is a sleep disorder where your upper airway muscles relax during sleep and the airway intermittently and repetitively narrows and collapses leading to partially blocked airway (hypopnea) or completely blocked airway (apnea) which, in turn, can disrupt breathing in sleep, lower oxygen levels while you sleep and cause night time wakings. Because both apnea and hypopnea may cause higher carbon dioxide or low oxygen levels, untreated JAMSHID can lead to heart arrhythmia, elevation of blood pressure, and make it harder for the body to consolidate memory and facilitate metabolism (leading to higher blood sugars at night). Frequent partial arousals occur during sleep resulting in sleep deprivation and daytime sleepiness. JAMSHID is associated with an increased risk of cardiovascular disease, stroke, hypertension, and insulin resistance. Moreover, untreated JAMSHID with excessive daytime sleepiness can increase the risk of motor vehicular accidents.    Below are conservative strategies for JAMSHID regardless of JAMSHID severity are:   Positional therapy - Avoid sleeping on your back.   Healthy diet and regular exercise to optimize weight is highly encouraged.   Avoid alcohol late in the evening and sedative-hypnotics as these substances can make sleep apnea worse.   Improve breathing through the nose with intranasal steroid spray, saline rinse, or antihistamines    Safety: Avoid driving vehicle and operating heavy equipment  while sleepy. Drowsy driving may lead to life-threatening motor vehicle accidents. A person driving while sleepy is 5 times more likely to have an accident. If you feel sleepy, pull over and take a short power nap (sleep for less than 30 minutes). Otherwise, ask somebody to drive you.    Treatment options for sleep apnea include weight management, positional therapy, Positive Airway Therapy (PAP) therapy, oral appliance therapy, hypoglossal nerve stimulator (Inspire) and select airway surgeries.    Sleep Testing for sleep apnea: The best and ideal way to check out if you have sleep apnea is to do an overnight sleep study in the sleep laboratory. Alternatively, a home sleep apnea test can also be done depending on your insurance and risk factors.     If you are having a home sleep apnea test, kindly allot 1 hour during pickup of the testing kit as you will have to complete paperwork and listen to the sleep technician for in-person on-the-spot demonstration and instructions on how to hook up the testing kit at home. Do the test for 1 day and start off with sleeping on your back. If you sleep on your side in the middle of night or you have always been a side or stomach-sleeper, it is ok as long as you have some time on your back and off-back.     If you are having an overnight in-lab sleep study, please make sure to bring toiletries, a comfy pillow, additional warm blankets, and any nighttime medications (include as-needed inhaler, pain pill, etc) that you may regularly take. Also, be sure to eat dinner before you arrive as we generally do not provide meals inside the sleep testing center. Lastly, in order to fall asleep faster in the sleep testing center, we advise patients to wake up 2 hours earlier on the morning of scheduled testing and avoid napping 2 days prior testing. Sometimes, your sleep provider may prescribe a sleep aid to be taken at lights out in the sleep testing center. If you are taking a sleep aid,  consider having somebody pick you up after the sleep testing.    Overnight sleep studies may be scheduled on a weekday or weekend. We also perform daytime testing for shift workers on a case-by-case basis.    Once you have booked an appointment to do the sleep study, please contact my office for follow-up visit to discuss results.    On the other hand, if you have any of the following, please consider calling the sleep testing center to RESCHEDULE your sleep study appointment:  If you tested positive for COVID within 10 days of your sleep study appointment.  If you were exposed to somebody who was confirmed for COVID within 10 days of your sleep study appointment and now you are having symptoms of possible COVID  If you have fever>100F or any acute symptoms that you think will lead to poor sleep during testing (e.g. new or worsening stuffy nose not relieved by steroid nasal spray)  If you have traveled domestically or internationally in the last month and now you are having symptoms of possible COVID    How to use nasal sprays properly: If you have nasal congestion or allergies, improving your breathing through the nose is critical for treating sleep apnea and improving your sleep. Hence, intranasal steroid spray like Flonase or Nasocort (generic name is Fluticasone) might help. In some patients with sleep apnea, using intranasal steroid spray allowed them to tolerate CPAP mask better.     Intranasal steroids are usually prescribed as 2 sprays per nostril 1 hour before bedtime. If you administer intranasal steroids too close to bedtime, it might not work as well.  If you have nasal congestion or allergies during day despite using Flonase at night, try adding Azelastine nasal spray 2 sprays per nostril in the morning. Alternatively, can consider adding over-the-counter non-sedating antihistamine medications.     However, if you have severe nasal congestion or allergies despite using both nasal sprays, consider seeing  an allergy specialist to confirm which substance you are sensitive to and get definitive treatment which may be in the form of injections, oral pills, different kind of nose sprays, and/or a combination of everything said.     Below are instructions on how to use intranasal steroid spray properly:  Sit up or stand up with your face down.  Shake the spray bottle and insert its tip in one nostril.  Point the spray tip towards your ear, and not towards the middle of your nose. Pointing the tip upward and in the middle of the nose can lead to discomfort and irritation of nasal mucosa which can lead to nose bleeding or coughing up tinges of blood.  Squeeze the spray bottle and administer the recommended amount of spray.   Don't sniff when you spray. Instead, remove the spray bottle and pinch your nose for 10 seconds.   Perform steps 1-6 on the other nostril.    You can also go to the following EDUCATION WEBSITES for further information:   American Academy of Sleep Medicine http://sleepeducation.org  National Sleep Foundation: https://sleepfoundation.org  American Sleep Apnea Association: https://www.sleepapnea.org (for patients with sleep apnea)  Narcolepsy Network: https://www.narcolepsynetwork.org (for patients with narcolepsy)  [employer]: https://www.wakeupnarcolepsy.org (for patients with narcolepsy)  Hypersomnia Foundation: https://www.hypersomniafoundation.org (for patients with idiopathic hypersomnia)  RLS foundation: https://www.rls.org (for patients with restless leg syndrome)    IMPORTANT INFORMATION     Call 911 for medical emergencies.  Our offices are generally open from Monday-Friday, 8 am - 5 pm.   There are no supporting services by either the sleep doctors or their staff on weekends and Holidays, or after 5 PM on weekdays.   If you need to get in touch with me, you may either call my office number or you can use [employer].  If a referral for a test, for CPAP, or for another specialist was made,  and you have not heard about scheduling this within a week, please call scheduling at [phone]).  If you are unable to make your appointment for clinic or an overnight study, kindly call the office or sleep testing center at least 48 hours in advance to cancel and reschedule.  If you are on CPAP, please bring your device's card and/or the device to each clinic appointment.   In case of problems with PAP machine or mask interface, please contact your DME (Durable Medical Equipment) company first. DME is the company who provides you the machine and/or PAP supplies.       PRESCRIPTIONS     We require 7 days advanced notice for prescription refills. If we do not receive the request in this time, we cannot guarantee that your medication will be refilled in time.    IMPORTANT PHONE NUMBERS     Sleep Medicine Clinic Fax: [phone]  Appointments (for Pediatric Sleep Clinic): [phone] - option 1  Appointments (for Adult Sleep Clinic): [phone] - option 2  Appointments (For Sleep Studies): [phone] - option 3  Behavioral Sleep Medicine: [phone]  Sleep Surgery: [phone]  Nutrition Service: [phone]  Weight management clinics with endocrinology: [phone]  Bariatric Services: [phone] (includes weight loss medications and weight loss surgery)  [facility]: [phone] (offers holistic approaches to weight management)  ENT (Otolaryngology): [phone]  Headache Clinic (Neurology): [phone]  Neurology: [phone]  Psychiatry: [phone]  Pulmonary Function Testing (PFT) Center: [phone]  Pulmonary Medicine: [phone]  Medical Service Company ([employer]): [phone]      OUR SLEEP TESTING LOCATIONS     Our team will contact you to schedule your sleep study, however, you can contact us as follow:  Main Phone Line (scheduling only): [phone]), option 3  Adult and Pediatric Locations  [facility] (6 years and older): Residence  "[facility] - 4th floor ([address] [facility]) After hours line: [phone]  [facility] (Main campus: All ages): [facility], 6th floor. After hours line: [phone]   [place] (5 years and older; younger considered on case-by-case basis): [address]; [address], [address]. Scheduling  After hours line: [phone]   [facility] (6 years and older): [address]; [address]; [address]   [place] (6 years and older): [address] [facility], [address]  After hours line: [phone]   [place] (13 years and older) in [place]: [address], 2nd floor  After hours line: [phone]   [place] (13 year and older): [address]  After hours line: [phone]     Adult Only Locations:   [place] (18 years and older): [address] [place] [address], 2nd floor   [place] (18 years and older): [address] [facility]; 4th floor  After hours line: [phone]  [facility] (18 years and older) at [place]: [address] [facility]  After hours line: [phone]     CONTACTING YOUR SLEEP MEDICINE PROVIDER AND SLEEP TEAM      For issues with your machine or mask interface, please call your DME provider first. DME stands for durable medical company. DME is the company who provides you the machine and/or PAP supplies / accessories.   To schedule, cancel, or reschedule SLEEP STUDY APPOINTMENTS, please call the Main Phone Line at [phone] - option 3.   To schedule, cancel, or reschedule CLINIC APPOINTMENTS, you can do it in \"MyChart\", call [phone] to speak with my  (Sirisha Russell), or call the Main Phone Line at [phone] - option 2  For CLINICAL QUESTIONS or MEDICATION REFILLS, please call direct line for Adult Sleep Nurses at [phone].   Lastly, you can also send a message directly to your provider through \"My Chart\", which is the email service through your  Records Account: " [email or web address]       Here at [facility], we wish you a restful sleep!

## 2024-11-07 ENCOUNTER — DOCUMENTATION (OUTPATIENT)
Dept: BEHAVIORAL HEALTH | Facility: CLINIC | Age: 54
End: 2024-11-07
Payer: COMMERCIAL

## 2024-11-08 ENCOUNTER — CLINICAL SUPPORT (OUTPATIENT)
Dept: EMERGENCY MEDICINE | Facility: HOSPITAL | Age: 54
DRG: 280 | End: 2024-11-08
Payer: COMMERCIAL

## 2024-11-08 ENCOUNTER — APPOINTMENT (OUTPATIENT)
Dept: RADIOLOGY | Facility: HOSPITAL | Age: 54
DRG: 280 | End: 2024-11-08
Payer: COMMERCIAL

## 2024-11-08 ENCOUNTER — DOCUMENTATION (OUTPATIENT)
Dept: PSYCHIATRY | Facility: HOSPITAL | Age: 54
End: 2024-11-08

## 2024-11-08 ENCOUNTER — APPOINTMENT (OUTPATIENT)
Dept: PRIMARY CARE | Facility: CLINIC | Age: 54
End: 2024-11-08
Payer: COMMERCIAL

## 2024-11-08 ENCOUNTER — HOSPITAL ENCOUNTER (INPATIENT)
Facility: HOSPITAL | Age: 54
End: 2024-11-08
Attending: EMERGENCY MEDICINE | Admitting: INTERNAL MEDICINE
Payer: COMMERCIAL

## 2024-11-08 VITALS
HEART RATE: 74 BPM | HEIGHT: 55 IN | BODY MASS INDEX: 28.26 KG/M2 | DIASTOLIC BLOOD PRESSURE: 74 MMHG | OXYGEN SATURATION: 98 % | TEMPERATURE: 97.1 F | SYSTOLIC BLOOD PRESSURE: 199 MMHG | WEIGHT: 122.1 LBS

## 2024-11-08 DIAGNOSIS — Z99.2 ESRD ON HEMODIALYSIS (MULTI): ICD-10-CM

## 2024-11-08 DIAGNOSIS — Z99.2 ESRD (END STAGE RENAL DISEASE) ON DIALYSIS (MULTI): ICD-10-CM

## 2024-11-08 DIAGNOSIS — N18.6 ESRD ON HEMODIALYSIS (MULTI): ICD-10-CM

## 2024-11-08 DIAGNOSIS — R10.9 STOMACH PAIN: Primary | ICD-10-CM

## 2024-11-08 DIAGNOSIS — I73.9 PAD (PERIPHERAL ARTERY DISEASE) (CMS-HCC): ICD-10-CM

## 2024-11-08 DIAGNOSIS — E11.42 POLYNEUROPATHY DUE TO TYPE 2 DIABETES MELLITUS (MULTI): ICD-10-CM

## 2024-11-08 DIAGNOSIS — F41.9 ANXIETY: ICD-10-CM

## 2024-11-08 DIAGNOSIS — I16.1 HYPERTENSIVE EMERGENCY: ICD-10-CM

## 2024-11-08 DIAGNOSIS — I1A.0 RESISTANT HYPERTENSION: ICD-10-CM

## 2024-11-08 DIAGNOSIS — M54.50 LOW BACK PAIN, UNSPECIFIED BACK PAIN LATERALITY, UNSPECIFIED CHRONICITY, UNSPECIFIED WHETHER SCIATICA PRESENT: ICD-10-CM

## 2024-11-08 DIAGNOSIS — N18.6 END STAGE RENAL DISEASE (MULTI): ICD-10-CM

## 2024-11-08 DIAGNOSIS — J18.9 PNEUMONIA DUE TO INFECTIOUS ORGANISM, UNSPECIFIED LATERALITY, UNSPECIFIED PART OF LUNG: ICD-10-CM

## 2024-11-08 DIAGNOSIS — I50.9 CONGESTIVE HEART FAILURE, UNSPECIFIED HF CHRONICITY, UNSPECIFIED HEART FAILURE TYPE: Primary | ICD-10-CM

## 2024-11-08 DIAGNOSIS — N18.6 ESRD (END STAGE RENAL DISEASE) ON DIALYSIS (MULTI): ICD-10-CM

## 2024-11-08 DIAGNOSIS — G47.30 SLEEP-RELATED BREATHING DISORDER: ICD-10-CM

## 2024-11-08 LAB
ALBUMIN SERPL BCP-MCNC: 4.5 G/DL (ref 3.4–5)
ALP SERPL-CCNC: 117 U/L (ref 33–110)
ALT SERPL W P-5'-P-CCNC: 9 U/L (ref 7–45)
ANION GAP BLDV CALCULATED.4IONS-SCNC: 11 MMOL/L (ref 10–25)
ANION GAP BLDV CALCULATED.4IONS-SCNC: 12 MMOL/L (ref 10–25)
ANION GAP SERPL CALC-SCNC: 20 MMOL/L (ref 10–20)
AST SERPL W P-5'-P-CCNC: 20 U/L (ref 9–39)
ATRIAL RATE: 85 BPM
ATRIAL RATE: 86 BPM
BASE EXCESS BLDV CALC-SCNC: 5.2 MMOL/L (ref -2–3)
BASE EXCESS BLDV CALC-SCNC: 5.7 MMOL/L (ref -2–3)
BASOPHILS # BLD AUTO: 0.08 X10*3/UL (ref 0–0.1)
BASOPHILS NFR BLD AUTO: 1.4 %
BILIRUB SERPL-MCNC: 0.5 MG/DL (ref 0–1.2)
BNP SERPL-MCNC: 2229 PG/ML (ref 0–99)
BODY TEMPERATURE: 37 DEGREES CELSIUS
BODY TEMPERATURE: 37 DEGREES CELSIUS
BUN SERPL-MCNC: 45 MG/DL (ref 6–23)
CA-I BLDV-SCNC: 1.23 MMOL/L (ref 1.1–1.33)
CA-I BLDV-SCNC: 1.24 MMOL/L (ref 1.1–1.33)
CALCIUM SERPL-MCNC: 10.1 MG/DL (ref 8.6–10.6)
CARDIAC TROPONIN I PNL SERPL HS: 39 NG/L (ref 0–34)
CARDIAC TROPONIN I PNL SERPL HS: 41 NG/L (ref 0–34)
CHLORIDE BLDV-SCNC: 102 MMOL/L (ref 98–107)
CHLORIDE BLDV-SCNC: 102 MMOL/L (ref 98–107)
CHLORIDE SERPL-SCNC: 98 MMOL/L (ref 98–107)
CO2 SERPL-SCNC: 27 MMOL/L (ref 21–32)
CREAT SERPL-MCNC: 8.96 MG/DL (ref 0.5–1.05)
EGFRCR SERPLBLD CKD-EPI 2021: 5 ML/MIN/1.73M*2
EOSINOPHIL # BLD AUTO: 0.77 X10*3/UL (ref 0–0.7)
EOSINOPHIL NFR BLD AUTO: 13.5 %
ERYTHROCYTE [DISTWIDTH] IN BLOOD BY AUTOMATED COUNT: 18.2 % (ref 11.5–14.5)
FLUAV RNA RESP QL NAA+PROBE: NOT DETECTED
FLUBV RNA RESP QL NAA+PROBE: NOT DETECTED
GLUCOSE BLDV-MCNC: 131 MG/DL (ref 74–99)
GLUCOSE BLDV-MCNC: 97 MG/DL (ref 74–99)
GLUCOSE SERPL-MCNC: 86 MG/DL (ref 74–99)
HCO3 BLDV-SCNC: 31.6 MMOL/L (ref 22–26)
HCO3 BLDV-SCNC: 31.9 MMOL/L (ref 22–26)
HCT VFR BLD AUTO: 29.5 % (ref 36–46)
HCT VFR BLD EST: 28 % (ref 36–46)
HCT VFR BLD EST: 30 % (ref 36–46)
HGB BLD-MCNC: 9.4 G/DL (ref 12–16)
HGB BLDV-MCNC: 9.3 G/DL (ref 12–16)
HGB BLDV-MCNC: 9.9 G/DL (ref 12–16)
HOLD SPECIMEN: NORMAL
IMM GRANULOCYTES # BLD AUTO: 0.06 X10*3/UL (ref 0–0.7)
IMM GRANULOCYTES NFR BLD AUTO: 1.1 % (ref 0–0.9)
LACTATE BLDV-SCNC: 0.7 MMOL/L (ref 0.4–2)
LACTATE BLDV-SCNC: 0.8 MMOL/L (ref 0.4–2)
LIPASE SERPL-CCNC: 24 U/L (ref 9–82)
LYMPHOCYTES # BLD AUTO: 1.14 X10*3/UL (ref 1.2–4.8)
LYMPHOCYTES NFR BLD AUTO: 20 %
MAGNESIUM SERPL-MCNC: 2.3 MG/DL (ref 1.6–2.4)
MCH RBC QN AUTO: 29.5 PG (ref 26–34)
MCHC RBC AUTO-ENTMCNC: 31.9 G/DL (ref 32–36)
MCV RBC AUTO: 93 FL (ref 80–100)
MONOCYTES # BLD AUTO: 0.39 X10*3/UL (ref 0.1–1)
MONOCYTES NFR BLD AUTO: 6.8 %
NEUTROPHILS # BLD AUTO: 3.26 X10*3/UL (ref 1.2–7.7)
NEUTROPHILS NFR BLD AUTO: 57.2 %
NRBC BLD-RTO: 0 /100 WBCS (ref 0–0)
OXYHGB MFR BLDV: 29.3 % (ref 45–75)
OXYHGB MFR BLDV: 32.1 % (ref 45–75)
P AXIS: 41 DEGREES
P AXIS: 43 DEGREES
P OFFSET: 194 MS
P OFFSET: 195 MS
P ONSET: 144 MS
P ONSET: 145 MS
PCO2 BLDV: 54 MM HG (ref 41–51)
PCO2 BLDV: 56 MM HG (ref 41–51)
PH BLDV: 7.36 PH (ref 7.33–7.43)
PH BLDV: 7.38 PH (ref 7.33–7.43)
PLATELET # BLD AUTO: 273 X10*3/UL (ref 150–450)
PO2 BLDV: 28 MM HG (ref 35–45)
PO2 BLDV: 28 MM HG (ref 35–45)
POTASSIUM BLDV-SCNC: 4.5 MMOL/L (ref 3.5–5.3)
POTASSIUM BLDV-SCNC: 5.4 MMOL/L (ref 3.5–5.3)
POTASSIUM SERPL-SCNC: 4.2 MMOL/L (ref 3.5–5.3)
PR INTERVAL: 148 MS
PR INTERVAL: 150 MS
PROT SERPL-MCNC: 8.3 G/DL (ref 6.4–8.2)
Q ONSET: 219 MS
Q ONSET: 219 MS
QRS COUNT: 14 BEATS
QRS COUNT: 14 BEATS
QRS DURATION: 82 MS
QRS DURATION: 84 MS
QT INTERVAL: 366 MS
QT INTERVAL: 370 MS
QTC CALCULATION(BAZETT): 437 MS
QTC CALCULATION(BAZETT): 440 MS
QTC FREDERICIA: 412 MS
QTC FREDERICIA: 415 MS
R AXIS: 5 DEGREES
R AXIS: 5 DEGREES
RBC # BLD AUTO: 3.19 X10*6/UL (ref 4–5.2)
SAO2 % BLDV: 30 % (ref 45–75)
SAO2 % BLDV: 33 % (ref 45–75)
SARS-COV-2 RNA RESP QL NAA+PROBE: NOT DETECTED
SODIUM BLDV-SCNC: 139 MMOL/L (ref 136–145)
SODIUM BLDV-SCNC: 141 MMOL/L (ref 136–145)
SODIUM SERPL-SCNC: 141 MMOL/L (ref 136–145)
T AXIS: 150 DEGREES
T AXIS: 152 DEGREES
T OFFSET: 402 MS
T OFFSET: 404 MS
VENTRICULAR RATE: 85 BPM
VENTRICULAR RATE: 86 BPM
WBC # BLD AUTO: 5.7 X10*3/UL (ref 4.4–11.3)

## 2024-11-08 PROCEDURE — 87636 SARSCOV2 & INF A&B AMP PRB: CPT

## 2024-11-08 PROCEDURE — 94640 AIRWAY INHALATION TREATMENT: CPT | Mod: 59

## 2024-11-08 PROCEDURE — 83690 ASSAY OF LIPASE: CPT

## 2024-11-08 PROCEDURE — 96365 THER/PROPH/DIAG IV INF INIT: CPT

## 2024-11-08 PROCEDURE — 2500000002 HC RX 250 W HCPCS SELF ADMINISTERED DRUGS (ALT 637 FOR MEDICARE OP, ALT 636 FOR OP/ED): Performed by: EMERGENCY MEDICINE

## 2024-11-08 PROCEDURE — 84484 ASSAY OF TROPONIN QUANT: CPT

## 2024-11-08 PROCEDURE — 83735 ASSAY OF MAGNESIUM: CPT

## 2024-11-08 PROCEDURE — 2500000001 HC RX 250 WO HCPCS SELF ADMINISTERED DRUGS (ALT 637 FOR MEDICARE OP)

## 2024-11-08 PROCEDURE — 71045 X-RAY EXAM CHEST 1 VIEW: CPT

## 2024-11-08 PROCEDURE — 93010 ELECTROCARDIOGRAM REPORT: CPT | Performed by: EMERGENCY MEDICINE

## 2024-11-08 PROCEDURE — 94660 CPAP INITIATION&MGMT: CPT

## 2024-11-08 PROCEDURE — 96367 TX/PROPH/DG ADDL SEQ IV INF: CPT

## 2024-11-08 PROCEDURE — 96375 TX/PRO/DX INJ NEW DRUG ADDON: CPT

## 2024-11-08 PROCEDURE — 36415 COLL VENOUS BLD VENIPUNCTURE: CPT

## 2024-11-08 PROCEDURE — 93005 ELECTROCARDIOGRAM TRACING: CPT

## 2024-11-08 PROCEDURE — 84132 ASSAY OF SERUM POTASSIUM: CPT

## 2024-11-08 PROCEDURE — 2500000004 HC RX 250 GENERAL PHARMACY W/ HCPCS (ALT 636 FOR OP/ED)

## 2024-11-08 PROCEDURE — 99285 EMERGENCY DEPT VISIT HI MDM: CPT | Mod: 25

## 2024-11-08 PROCEDURE — 85025 COMPLETE CBC W/AUTO DIFF WBC: CPT

## 2024-11-08 PROCEDURE — 71045 X-RAY EXAM CHEST 1 VIEW: CPT | Performed by: RADIOLOGY

## 2024-11-08 PROCEDURE — 2500000004 HC RX 250 GENERAL PHARMACY W/ HCPCS (ALT 636 FOR OP/ED): Performed by: EMERGENCY MEDICINE

## 2024-11-08 PROCEDURE — 99285 EMERGENCY DEPT VISIT HI MDM: CPT | Performed by: EMERGENCY MEDICINE

## 2024-11-08 PROCEDURE — 83880 ASSAY OF NATRIURETIC PEPTIDE: CPT

## 2024-11-08 RX ORDER — HYDROMORPHONE HYDROCHLORIDE 1 MG/ML
0.5 INJECTION, SOLUTION INTRAMUSCULAR; INTRAVENOUS; SUBCUTANEOUS ONCE
Status: COMPLETED | OUTPATIENT
Start: 2024-11-08 | End: 2024-11-08

## 2024-11-08 RX ORDER — FUROSEMIDE 10 MG/ML
40 INJECTION INTRAMUSCULAR; INTRAVENOUS ONCE
Status: COMPLETED | OUTPATIENT
Start: 2024-11-08 | End: 2024-11-09

## 2024-11-08 RX ORDER — CEFTRIAXONE 1 G/50ML
1 INJECTION, SOLUTION INTRAVENOUS ONCE
Status: COMPLETED | OUTPATIENT
Start: 2024-11-08 | End: 2024-11-08

## 2024-11-08 RX ORDER — IPRATROPIUM BROMIDE AND ALBUTEROL SULFATE 2.5; .5 MG/3ML; MG/3ML
3 SOLUTION RESPIRATORY (INHALATION) ONCE
Status: COMPLETED | OUTPATIENT
Start: 2024-11-08 | End: 2024-11-08

## 2024-11-08 RX ORDER — HYDRALAZINE HYDROCHLORIDE 25 MG/1
100 TABLET, FILM COATED ORAL ONCE
Status: COMPLETED | OUTPATIENT
Start: 2024-11-08 | End: 2024-11-08

## 2024-11-08 RX ORDER — ISOSORBIDE MONONITRATE 60 MG/1
120 TABLET, EXTENDED RELEASE ORAL DAILY
Status: DISCONTINUED | OUTPATIENT
Start: 2024-11-08 | End: 2024-11-09

## 2024-11-08 ASSESSMENT — PAIN SCALES - GENERAL
PAINLEVEL_OUTOF10: 0-NO PAIN
PAINLEVEL_OUTOF10: 7
PAINLEVEL_OUTOF10: 8

## 2024-11-08 ASSESSMENT — PAIN - FUNCTIONAL ASSESSMENT: PAIN_FUNCTIONAL_ASSESSMENT: 0-10

## 2024-11-08 ASSESSMENT — COLUMBIA-SUICIDE SEVERITY RATING SCALE - C-SSRS
6. HAVE YOU EVER DONE ANYTHING, STARTED TO DO ANYTHING, OR PREPARED TO DO ANYTHING TO END YOUR LIFE?: NO
2. HAVE YOU ACTUALLY HAD ANY THOUGHTS OF KILLING YOURSELF?: NO
1. IN THE PAST MONTH, HAVE YOU WISHED YOU WERE DEAD OR WISHED YOU COULD GO TO SLEEP AND NOT WAKE UP?: NO
4. HAVE YOU HAD THESE THOUGHTS AND HAD SOME INTENTION OF ACTING ON THEM?: NO
5. HAVE YOU STARTED TO WORK OUT OR WORKED OUT THE DETAILS OF HOW TO KILL YOURSELF? DO YOU INTEND TO CARRY OUT THIS PLAN?: NO

## 2024-11-08 ASSESSMENT — ENCOUNTER SYMPTOMS
OCCASIONAL FEELINGS OF UNSTEADINESS: 0
DEPRESSION: 0
LOSS OF SENSATION IN FEET: 0

## 2024-11-08 ASSESSMENT — PAIN DESCRIPTION - LOCATION: LOCATION: HEAD

## 2024-11-08 NOTE — PROGRESS NOTES
Stacey Heath  Age: 54 y.o.  MRN: [id number]  Date: 11/8/2024  Location of service: in community    Program Details  Medicaid Community Clinical Case Management  Status: Enrolled  Effective Dates: 10/17/2023 - present  Responsible Staff: NAREN Davidson      Goals Reviewed:  Problem: Anxiety       Goal: Attempts to manage anxiety with help       Priority: High         Goal: Verbalizes ways to manage anxiety       Priority: High         Goal: Implements measures to reduce anxiety       Priority: High        Problem: Financial Stressors       Goal: Assistance with financial concerns               Summary:  This provider met with patient at the patient's home. This provider listened with empathy as patient explained a recent medical appointment that she had. Patient expressed that she was pleased with some of the medical staff that she engaged with. Patient also explained that the nurse is trying to get patient into a Pulmonologist on an emergency case but still could not get scheduled until March. Provider did a check in to see if patient has heard anything about her SNAP benefits. Provider assist patient with opening a new case once patient receives the needed paperwork from [facility].                      NAREN Davidson

## 2024-11-08 NOTE — PROGRESS NOTES
"Subjective   Patient ID: Stacey Heath is a 54 y.o. female who presents for Establish Care.    HPI   54 y.o. 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  Patient was recently discharged from the hospital after admission for pulmonary edema ISO hypertensive urgency.    # hypertensive urgency  - denies cp, sob.   - Current meds: nifedipine 90 mg every day , carvedilol 25, hydralazine 100 mg TID, valsartan 320 mg, torsemide 20 mg.  Patient states that she has been taking all of her home meds and took her a.m. meds.    # dyspnea   -Started prior to the hospital admission.  States that it started about 2 months ago.  States that she denies feeling like she was swelling.  She usually knows when she is fluid overloaded and did not feel fluid overloaded.  - gets SOB when walking but sometimes when sitting.Sitting laying down.   - everytime she walks a certain distance she gets SOB.   -Patient is currently on 2 L O2.  She states that she will need oxygen every time she leaves the hospital.  She states that she has an appointment coming up with pulmonology.  - using albuterol once a day.   - stopped smoking about 4 years ago.   - slow progression of SOB.   - ECHO on 4/2024--> LVEF of 60-65%. Moderately increased left ventricular septal thickness. No significant changes from 2023.   -Weight stable and around dry weight.  - Endorses orthopnea.    Review of Systems  12 point ROS negative except as stated in HPI.     Objective   BP (!) 185/116 (BP Location: Right arm, Patient Position: Sitting, BP Cuff Size: Adult)   Pulse 74   Temp 36.2 °C (97.1 °F) (Temporal)   Ht 1.397 m (4' 7\")   Wt 55.4 kg (122 lb 1.6 oz)   SpO2 98%   BMI 28.38 kg/m²     Physical Exam  Gen: NAD, nontox  HEENT: NCAT. MMM.  RESP: no resp distress, talks in full sentences, CTABL  CVS: non-tachycardic, " RRR, holosystolic murmur diffusely., JVD present   ABD: Soft, non-distended  Psych: appropriately answers questions. normal mood and affect  MSK: appears to have Full range of motion on all extremities.    Assessment/Plan   54 y.o. 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  Patient was recently discharged from the hospital after admission for pulmonary edema ISO hypertensive urgency.  Patient comes to the clinic with BP of 199/74.  On repeat patient had elevated BP as well.  Patient is asymptomatic.  ISO HFpEF will consider adding spironolactone or Entresto to her medication regimen.  However patient has appointment coming up with cardiology this week.  Will reach out to provider to ensure that patient can start this medication.  Physical exam is notable for JVD and hyperdynamic heart sounds.    # Hypertensive urgency  :: Patient is asymptomatic.  I will BP of 199/74.  - Continue with taking current medications.  - Will reach out to cardiology regarding adding spironolactone or Entresto to medication regimen..     # abdominal pain   :: per hospital discharge, pt was having abdominal pain during hospital course. A CT abdomen pelvis was ordered but pt had not obtained.   - ordered CT abdomen and pelvis      RTC in 1 month for follow-up of HTN     Discussed with Dr. Ryder,     Sherri Gastelum MD, MPH   Family Medicine and Preventive Health, PGY-3

## 2024-11-09 ENCOUNTER — APPOINTMENT (OUTPATIENT)
Dept: DIALYSIS | Facility: HOSPITAL | Age: 54
End: 2024-11-09
Payer: COMMERCIAL

## 2024-11-09 ENCOUNTER — APPOINTMENT (OUTPATIENT)
Dept: CARDIOLOGY | Facility: HOSPITAL | Age: 54
DRG: 280 | End: 2024-11-09
Payer: COMMERCIAL

## 2024-11-09 ENCOUNTER — CLINICAL SUPPORT (OUTPATIENT)
Dept: EMERGENCY MEDICINE | Facility: HOSPITAL | Age: 54
DRG: 280 | End: 2024-11-09
Payer: COMMERCIAL

## 2024-11-09 PROBLEM — I50.9 CONGESTIVE HEART FAILURE, UNSPECIFIED HF CHRONICITY, UNSPECIFIED HEART FAILURE TYPE: Status: ACTIVE | Noted: 2024-11-09

## 2024-11-09 LAB
ABO GROUP (TYPE) IN BLOOD: NORMAL
ALBUMIN SERPL BCP-MCNC: 3.8 G/DL (ref 3.4–5)
ALP SERPL-CCNC: 90 U/L (ref 33–110)
ALT SERPL W P-5'-P-CCNC: 12 U/L (ref 7–45)
ANION GAP BLDV CALCULATED.4IONS-SCNC: 10 MMOL/L (ref 10–25)
ANION GAP SERPL CALC-SCNC: 19 MMOL/L (ref 10–20)
ANTIBODY SCREEN: NORMAL
APTT PPP: 38 SECONDS (ref 27–38)
AST SERPL W P-5'-P-CCNC: 37 U/L (ref 9–39)
BASE EXCESS BLDV CALC-SCNC: 3.6 MMOL/L (ref -2–3)
BASOPHILS # BLD AUTO: 0.07 X10*3/UL (ref 0–0.1)
BASOPHILS NFR BLD AUTO: 1 %
BILIRUB SERPL-MCNC: 0.5 MG/DL (ref 0–1.2)
BODY TEMPERATURE: 37 DEGREES CELSIUS
BUN SERPL-MCNC: 51 MG/DL (ref 6–23)
CA-I BLDV-SCNC: 1.21 MMOL/L (ref 1.1–1.33)
CALCIUM SERPL-MCNC: 9.2 MG/DL (ref 8.6–10.6)
CHLORIDE BLDV-SCNC: 103 MMOL/L (ref 98–107)
CHLORIDE SERPL-SCNC: 100 MMOL/L (ref 98–107)
CHOLEST SERPL-MCNC: 108 MG/DL (ref 0–199)
CHOLESTEROL/HDL RATIO: 2.4
CO2 SERPL-SCNC: 26 MMOL/L (ref 21–32)
CREAT SERPL-MCNC: 9.32 MG/DL (ref 0.5–1.05)
EGFRCR SERPLBLD CKD-EPI 2021: 5 ML/MIN/1.73M*2
EOSINOPHIL # BLD AUTO: 0.75 X10*3/UL (ref 0–0.7)
EOSINOPHIL NFR BLD AUTO: 10.8 %
ERYTHROCYTE [DISTWIDTH] IN BLOOD BY AUTOMATED COUNT: 18.6 % (ref 11.5–14.5)
EST. AVERAGE GLUCOSE BLD GHB EST-MCNC: 88 MG/DL
GLUCOSE BLDV-MCNC: 176 MG/DL (ref 74–99)
GLUCOSE SERPL-MCNC: 102 MG/DL (ref 74–99)
HBA1C MFR BLD: 4.7 %
HCO3 BLDV-SCNC: 29.8 MMOL/L (ref 22–26)
HCT VFR BLD AUTO: 26.1 % (ref 36–46)
HCT VFR BLD EST: 24 % (ref 36–46)
HDLC SERPL-MCNC: 45.1 MG/DL
HGB BLD-MCNC: 7.9 G/DL (ref 12–16)
HGB BLDV-MCNC: 7.9 G/DL (ref 12–16)
IMM GRANULOCYTES # BLD AUTO: 0.07 X10*3/UL (ref 0–0.7)
IMM GRANULOCYTES NFR BLD AUTO: 1 % (ref 0–0.9)
INHALED O2 CONCENTRATION: 32 %
INR PPP: 1.3 (ref 0.9–1.1)
LACTATE BLDV-SCNC: 0.6 MMOL/L (ref 0.4–2)
LACTATE SERPL-SCNC: 0.6 MMOL/L (ref 0.4–2)
LDLC SERPL CALC-MCNC: 54 MG/DL
LYMPHOCYTES # BLD AUTO: 0.98 X10*3/UL (ref 1.2–4.8)
LYMPHOCYTES NFR BLD AUTO: 14.1 %
MAGNESIUM SERPL-MCNC: 2.38 MG/DL (ref 1.6–2.4)
MCH RBC QN AUTO: 29.5 PG (ref 26–34)
MCHC RBC AUTO-ENTMCNC: 30.3 G/DL (ref 32–36)
MCV RBC AUTO: 97 FL (ref 80–100)
MONOCYTES # BLD AUTO: 0.53 X10*3/UL (ref 0.1–1)
MONOCYTES NFR BLD AUTO: 7.6 %
NEUTROPHILS # BLD AUTO: 4.55 X10*3/UL (ref 1.2–7.7)
NEUTROPHILS NFR BLD AUTO: 65.5 %
NON HDL CHOLESTEROL: 63 MG/DL (ref 0–149)
NRBC BLD-RTO: 0 /100 WBCS (ref 0–0)
OXYHGB MFR BLDV: 47.6 % (ref 45–75)
PCO2 BLDV: 54 MM HG (ref 41–51)
PH BLDV: 7.35 PH (ref 7.33–7.43)
PHOSPHATE SERPL-MCNC: 6.1 MG/DL (ref 2.5–4.9)
PLATELET # BLD AUTO: 247 X10*3/UL (ref 150–450)
PO2 BLDV: 36 MM HG (ref 35–45)
POTASSIUM BLDV-SCNC: 5.3 MMOL/L (ref 3.5–5.3)
POTASSIUM SERPL-SCNC: 6 MMOL/L (ref 3.5–5.3)
PROT SERPL-MCNC: 6.8 G/DL (ref 6.4–8.2)
PROTHROMBIN TIME: 14.6 SECONDS (ref 9.8–12.8)
RBC # BLD AUTO: 2.68 X10*6/UL (ref 4–5.2)
RH FACTOR (ANTIGEN D): NORMAL
SAO2 % BLDV: 48 % (ref 45–75)
SODIUM BLDV-SCNC: 137 MMOL/L (ref 136–145)
SODIUM SERPL-SCNC: 139 MMOL/L (ref 136–145)
TRIGL SERPL-MCNC: 46 MG/DL (ref 0–149)
TSH SERPL-ACNC: 3.55 MIU/L (ref 0.44–3.98)
VLDL: 9 MG/DL (ref 0–40)
WBC # BLD AUTO: 7 X10*3/UL (ref 4.4–11.3)

## 2024-11-09 PROCEDURE — 83735 ASSAY OF MAGNESIUM: CPT

## 2024-11-09 PROCEDURE — 2500000005 HC RX 250 GENERAL PHARMACY W/O HCPCS: Performed by: EMERGENCY MEDICINE

## 2024-11-09 PROCEDURE — 2500000001 HC RX 250 WO HCPCS SELF ADMINISTERED DRUGS (ALT 637 FOR MEDICARE OP)

## 2024-11-09 PROCEDURE — 93005 ELECTROCARDIOGRAM TRACING: CPT

## 2024-11-09 PROCEDURE — 2500000004 HC RX 250 GENERAL PHARMACY W/ HCPCS (ALT 636 FOR OP/ED)

## 2024-11-09 PROCEDURE — 99222 1ST HOSP IP/OBS MODERATE 55: CPT | Performed by: INTERNAL MEDICINE

## 2024-11-09 PROCEDURE — 94640 AIRWAY INHALATION TREATMENT: CPT

## 2024-11-09 PROCEDURE — 83036 HEMOGLOBIN GLYCOSYLATED A1C: CPT

## 2024-11-09 PROCEDURE — 84443 ASSAY THYROID STIM HORMONE: CPT

## 2024-11-09 PROCEDURE — 5A1D70Z PERFORMANCE OF URINARY FILTRATION, INTERMITTENT, LESS THAN 6 HOURS PER DAY: ICD-10-PCS

## 2024-11-09 PROCEDURE — 93010 ELECTROCARDIOGRAM REPORT: CPT | Performed by: INTERNAL MEDICINE

## 2024-11-09 PROCEDURE — 36415 COLL VENOUS BLD VENIPUNCTURE: CPT

## 2024-11-09 PROCEDURE — 84132 ASSAY OF SERUM POTASSIUM: CPT

## 2024-11-09 PROCEDURE — 2500000002 HC RX 250 W HCPCS SELF ADMINISTERED DRUGS (ALT 637 FOR MEDICARE OP, ALT 636 FOR OP/ED)

## 2024-11-09 PROCEDURE — 85025 COMPLETE CBC W/AUTO DIFF WBC: CPT

## 2024-11-09 PROCEDURE — 86901 BLOOD TYPING SEROLOGIC RH(D): CPT

## 2024-11-09 PROCEDURE — 8010000001 HC DIALYSIS - HEMODIALYSIS PER DAY

## 2024-11-09 PROCEDURE — 80061 LIPID PANEL: CPT

## 2024-11-09 PROCEDURE — 84100 ASSAY OF PHOSPHORUS: CPT

## 2024-11-09 PROCEDURE — 85610 PROTHROMBIN TIME: CPT

## 2024-11-09 PROCEDURE — 99291 CRITICAL CARE FIRST HOUR: CPT

## 2024-11-09 PROCEDURE — 1100000001 HC PRIVATE ROOM DAILY

## 2024-11-09 PROCEDURE — 96375 TX/PRO/DX INJ NEW DRUG ADDON: CPT

## 2024-11-09 PROCEDURE — 83605 ASSAY OF LACTIC ACID: CPT

## 2024-11-09 PROCEDURE — 80307 DRUG TEST PRSMV CHEM ANLYZR: CPT

## 2024-11-09 PROCEDURE — 6350000001 HC RX 635 EPOETIN >10,000 UNITS: Mod: JZ

## 2024-11-09 RX ORDER — ACETAMINOPHEN 325 MG/1
325 TABLET ORAL ONCE
Status: COMPLETED | OUTPATIENT
Start: 2024-11-09 | End: 2024-11-09

## 2024-11-09 RX ORDER — ATORVASTATIN CALCIUM 80 MG/1
80 TABLET, FILM COATED ORAL NIGHTLY
Status: DISCONTINUED | OUTPATIENT
Start: 2024-11-09 | End: 2024-11-20 | Stop reason: HOSPADM

## 2024-11-09 RX ORDER — ACETAMINOPHEN 325 MG/1
975 TABLET ORAL ONCE
Status: COMPLETED | OUTPATIENT
Start: 2024-11-09 | End: 2024-11-09

## 2024-11-09 RX ORDER — ALBUTEROL SULFATE 0.83 MG/ML
2.5 SOLUTION RESPIRATORY (INHALATION) EVERY 2 HOUR PRN
Status: DISCONTINUED | OUTPATIENT
Start: 2024-11-09 | End: 2024-11-20 | Stop reason: HOSPADM

## 2024-11-09 RX ORDER — ONDANSETRON HYDROCHLORIDE 2 MG/ML
4 INJECTION, SOLUTION INTRAVENOUS ONCE
Status: COMPLETED | OUTPATIENT
Start: 2024-11-09 | End: 2024-11-09

## 2024-11-09 RX ORDER — IPRATROPIUM BROMIDE AND ALBUTEROL SULFATE 2.5; .5 MG/3ML; MG/3ML
3 SOLUTION RESPIRATORY (INHALATION)
Status: DISCONTINUED | OUTPATIENT
Start: 2024-11-09 | End: 2024-11-20 | Stop reason: HOSPADM

## 2024-11-09 RX ORDER — GABAPENTIN 300 MG/1
300 CAPSULE ORAL NIGHTLY
Status: DISCONTINUED | OUTPATIENT
Start: 2024-11-09 | End: 2024-11-20 | Stop reason: HOSPADM

## 2024-11-09 RX ORDER — IPRATROPIUM BROMIDE AND ALBUTEROL SULFATE 2.5; .5 MG/3ML; MG/3ML
3 SOLUTION RESPIRATORY (INHALATION)
Status: DISCONTINUED | OUTPATIENT
Start: 2024-11-09 | End: 2024-11-09

## 2024-11-09 RX ORDER — NICARDIPINE HYDROCHLORIDE 0.2 MG/ML
2.5-15 INJECTION INTRAVENOUS CONTINUOUS
Status: DISCONTINUED | OUTPATIENT
Start: 2024-11-09 | End: 2024-11-09

## 2024-11-09 RX ORDER — ISOSORBIDE MONONITRATE 60 MG/1
120 TABLET, EXTENDED RELEASE ORAL DAILY
Status: DISCONTINUED | OUTPATIENT
Start: 2024-11-09 | End: 2024-11-20 | Stop reason: HOSPADM

## 2024-11-09 RX ORDER — BUMETANIDE 0.25 MG/ML
6 INJECTION, SOLUTION INTRAMUSCULAR; INTRAVENOUS ONCE
Status: DISCONTINUED | OUTPATIENT
Start: 2024-11-09 | End: 2024-11-09

## 2024-11-09 RX ORDER — FLUTICASONE PROPIONATE 50 MCG
2 SPRAY, SUSPENSION (ML) NASAL DAILY
Status: DISCONTINUED | OUTPATIENT
Start: 2024-11-09 | End: 2024-11-20 | Stop reason: HOSPADM

## 2024-11-09 RX ORDER — HYDRALAZINE HYDROCHLORIDE 25 MG/1
100 TABLET, FILM COATED ORAL 3 TIMES DAILY
Status: DISCONTINUED | OUTPATIENT
Start: 2024-11-09 | End: 2024-11-20 | Stop reason: HOSPADM

## 2024-11-09 RX ORDER — POLYETHYLENE GLYCOL 3350 17 G/17G
17 POWDER, FOR SOLUTION ORAL DAILY
Status: DISCONTINUED | OUTPATIENT
Start: 2024-11-09 | End: 2024-11-20 | Stop reason: HOSPADM

## 2024-11-09 RX ORDER — CALCIUM ACETATE 667 MG/1
1334 CAPSULE ORAL
Status: DISCONTINUED | OUTPATIENT
Start: 2024-11-09 | End: 2024-11-20 | Stop reason: HOSPADM

## 2024-11-09 RX ORDER — ACETAMINOPHEN 325 MG/1
650 TABLET ORAL EVERY 6 HOURS PRN
Status: DISCONTINUED | OUTPATIENT
Start: 2024-11-09 | End: 2024-11-09

## 2024-11-09 RX ORDER — PANTOPRAZOLE SODIUM 40 MG/1
40 TABLET, DELAYED RELEASE ORAL AS NEEDED
Status: DISCONTINUED | OUTPATIENT
Start: 2024-11-09 | End: 2024-11-20 | Stop reason: HOSPADM

## 2024-11-09 RX ORDER — ACETAMINOPHEN 325 MG/1
975 TABLET ORAL EVERY 6 HOURS PRN
Status: DISCONTINUED | OUTPATIENT
Start: 2024-11-09 | End: 2024-11-20 | Stop reason: HOSPADM

## 2024-11-09 RX ORDER — ASPIRIN 81 MG/1
81 TABLET ORAL DAILY
Status: DISCONTINUED | OUTPATIENT
Start: 2024-11-09 | End: 2024-11-20 | Stop reason: HOSPADM

## 2024-11-09 RX ORDER — CARVEDILOL 25 MG/1
50 TABLET ORAL 2 TIMES DAILY
Status: DISCONTINUED | OUTPATIENT
Start: 2024-11-09 | End: 2024-11-20 | Stop reason: HOSPADM

## 2024-11-09 RX ORDER — NIFEDIPINE 90 MG/1
90 TABLET, EXTENDED RELEASE ORAL
Status: DISCONTINUED | OUTPATIENT
Start: 2024-11-09 | End: 2024-11-20 | Stop reason: HOSPADM

## 2024-11-09 RX ORDER — FLUTICASONE FUROATE AND VILANTEROL 100; 25 UG/1; UG/1
1 POWDER RESPIRATORY (INHALATION)
Status: DISCONTINUED | OUTPATIENT
Start: 2024-11-09 | End: 2024-11-20 | Stop reason: HOSPADM

## 2024-11-09 RX ORDER — HYDROXYZINE HYDROCHLORIDE 25 MG/1
25 TABLET, FILM COATED ORAL EVERY 6 HOURS PRN
Status: DISCONTINUED | OUTPATIENT
Start: 2024-11-09 | End: 2024-11-20 | Stop reason: HOSPADM

## 2024-11-09 RX ORDER — DIPHENHYDRAMINE HCL 25 MG
25 CAPSULE ORAL ONCE
Status: COMPLETED | OUTPATIENT
Start: 2024-11-09 | End: 2024-11-09

## 2024-11-09 RX ORDER — ACETAMINOPHEN 325 MG/1
650 TABLET ORAL EVERY 4 HOURS PRN
Status: DISCONTINUED | OUTPATIENT
Start: 2024-11-09 | End: 2024-11-09

## 2024-11-09 RX ORDER — NICARDIPINE HYDROCHLORIDE 0.2 MG/ML
INJECTION INTRAVENOUS
Status: COMPLETED
Start: 2024-11-09 | End: 2024-11-09

## 2024-11-09 SDOH — SOCIAL STABILITY: SOCIAL INSECURITY: HAS ANYONE EVER THREATENED TO HURT YOUR FAMILY OR YOUR PETS?: NO

## 2024-11-09 SDOH — SOCIAL STABILITY: SOCIAL INSECURITY: DOES ANYONE TRY TO KEEP YOU FROM HAVING/CONTACTING OTHER FRIENDS OR DOING THINGS OUTSIDE YOUR HOME?: NO

## 2024-11-09 SDOH — SOCIAL STABILITY: SOCIAL INSECURITY: HAVE YOU HAD ANY THOUGHTS OF HARMING ANYONE ELSE?: NO

## 2024-11-09 SDOH — SOCIAL STABILITY: SOCIAL INSECURITY: WERE YOU ABLE TO COMPLETE ALL THE BEHAVIORAL HEALTH SCREENINGS?: YES

## 2024-11-09 SDOH — SOCIAL STABILITY: SOCIAL INSECURITY: ARE YOU OR HAVE YOU BEEN THREATENED OR ABUSED PHYSICALLY, EMOTIONALLY, OR SEXUALLY BY ANYONE?: NO

## 2024-11-09 SDOH — SOCIAL STABILITY: SOCIAL INSECURITY: ABUSE: ADULT

## 2024-11-09 SDOH — SOCIAL STABILITY: SOCIAL INSECURITY: ARE THERE ANY APPARENT SIGNS OF INJURIES/BEHAVIORS THAT COULD BE RELATED TO ABUSE/NEGLECT?: NO

## 2024-11-09 SDOH — SOCIAL STABILITY: SOCIAL INSECURITY: HAVE YOU HAD THOUGHTS OF HARMING ANYONE ELSE?: NO

## 2024-11-09 SDOH — SOCIAL STABILITY: SOCIAL INSECURITY: DO YOU FEEL ANYONE HAS EXPLOITED OR TAKEN ADVANTAGE OF YOU FINANCIALLY OR OF YOUR PERSONAL PROPERTY?: NO

## 2024-11-09 SDOH — SOCIAL STABILITY: SOCIAL INSECURITY: DO YOU FEEL UNSAFE GOING BACK TO THE PLACE WHERE YOU ARE LIVING?: NO

## 2024-11-09 ASSESSMENT — ACTIVITIES OF DAILY LIVING (ADL)
PATIENT'S MEMORY ADEQUATE TO SAFELY COMPLETE DAILY ACTIVITIES?: YES
GROOMING: NEEDS ASSISTANCE
TOILETING: NEEDS ASSISTANCE
ADEQUATE_TO_COMPLETE_ADL: YES
FEEDING YOURSELF: NEEDS ASSISTANCE
JUDGMENT_ADEQUATE_SAFELY_COMPLETE_DAILY_ACTIVITIES: YES
HEARING - LEFT EAR: FUNCTIONAL
BATHING: NEEDS ASSISTANCE
DRESSING YOURSELF: NEEDS ASSISTANCE
HEARING - RIGHT EAR: FUNCTIONAL
LACK_OF_TRANSPORTATION: NO
WALKS IN HOME: NEEDS ASSISTANCE

## 2024-11-09 ASSESSMENT — COGNITIVE AND FUNCTIONAL STATUS - GENERAL
TOILETING: A LITTLE
WALKING IN HOSPITAL ROOM: A LITTLE
MOVING TO AND FROM BED TO CHAIR: A LITTLE
TURNING FROM BACK TO SIDE WHILE IN FLAT BAD: A LITTLE
MOBILITY SCORE: 18
MOVING FROM LYING ON BACK TO SITTING ON SIDE OF FLAT BED WITH BEDRAILS: A LITTLE
HELP NEEDED FOR BATHING: A LITTLE
STANDING UP FROM CHAIR USING ARMS: A LITTLE
PERSONAL GROOMING: A LITTLE
DRESSING REGULAR LOWER BODY CLOTHING: A LITTLE
DRESSING REGULAR UPPER BODY CLOTHING: A LITTLE
EATING MEALS: A LITTLE
DAILY ACTIVITIY SCORE: 18
CLIMB 3 TO 5 STEPS WITH RAILING: A LITTLE

## 2024-11-09 ASSESSMENT — PAIN SCALES - WONG BAKER
WONGBAKER_NUMERICALRESPONSE: HURTS LITTLE BIT
WONGBAKER_NUMERICALRESPONSE: HURTS LITTLE BIT

## 2024-11-09 ASSESSMENT — LIFESTYLE VARIABLES
HOW OFTEN DO YOU HAVE 6 OR MORE DRINKS ON ONE OCCASION: NEVER
SKIP TO QUESTIONS 9-10: 1
HOW OFTEN DO YOU HAVE A DRINK CONTAINING ALCOHOL: NEVER
HOW MANY STANDARD DRINKS CONTAINING ALCOHOL DO YOU HAVE ON A TYPICAL DAY: PATIENT DOES NOT DRINK
AUDIT-C TOTAL SCORE: 0
AUDIT-C TOTAL SCORE: 0

## 2024-11-09 ASSESSMENT — PAIN SCALES - GENERAL
PAINLEVEL_OUTOF10: 7
PAINLEVEL_OUTOF10: 3
PAINLEVEL_OUTOF10: 7

## 2024-11-09 ASSESSMENT — PAIN - FUNCTIONAL ASSESSMENT
PAIN_FUNCTIONAL_ASSESSMENT: 0-10

## 2024-11-09 ASSESSMENT — PAIN SCALES - PAIN ASSESSMENT IN ADVANCED DEMENTIA (PAINAD): TOTALSCORE: MEDICATION (SEE MAR)

## 2024-11-09 NOTE — H&P
HPI  54 y.o. 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 She is admitted to the CICU for the management of hypertensive emergency with flash pulmonary edema.     On interview, she reports subacute to chronic shortness of breathe and MATHEW. She also reports that she feels that she ahs had a cold all week with subjective fevers, nasal congestion, and nonproductive cough. She denies night sweats and sick contacts. She denies chest pain, but endorses lightheadedness/dizziness, palpitations, chronic orthopnea and PND. She denies LE edema, but reports ab distention. She denies home O2 requirement. She reports that she has excellent compliance with her home BP meds and firmly denies ever missing doses. She states last iHD was completed  and that she tolerated a full session. She states that she is not anuric and makes 1-2 cups of urine daily. She states that she takes her BP 2x daily at home and an average reading is 160s/70s.     Per chart review, has had numerous admissions for HTN emergency/crisis including 1 already in the month of November and 2 admissions in October with one presentation for iHD and discharge from ED. Evaluated by sleep medicine 2024 who feel she may have component of JAMSHID, will need formal sleep study. Was seen by PCP today and npted to have SBP >190s.     In the ED:  - Vitals:  T 97, HR 88, /85,  RR 22, SpO2 97% on RA    - Labs:  CBC: WBC  5.7, Hgb 9.4, plt 273  BMP: Na 141, K 4.2, Cl 98, HCO3 27, BUN 45, Cr 8.96, glu 86  LFT: Ca 10.1, tprot 8.3, alb 4.5, alkphos 117, AST 20, ALT 9, tbili 0.5  Electrolytes: Mg 2.30  VB.38/54/28/33% L-0.8  hs-TnI 41>39, BNP 2229,  Lipase 24  COVID/FLU - negative    - Imaging:  CXR:  IMPRESSION:  1. Prominent interstitial  markings and bibasilar opacities which may  represent mixed interstitial/alveolar edema with bibasilar  atelectasis or pneumonia not excluded.  2. Small left pleural effusion.    - ECG:  NSR, LVH TWI I, II, V6    In the ED, given Imdur 120 mg x1, 40 mg IV lasix, hydral 100 mg PO x1,dilaudid 0.5 mg x1, duoneb x1, and ceftriaxone 1 g IV/azithromycin 500 mg IV x1. She was started on BiPAP and admitted to the CICU for the management of hypertensive emergency.       Cardiac  Hx:    - Echocardiography:04/2024  CONCLUSIONS:   1. Left ventricular systolic function is normal with a 60-65% estimated ejection fraction.   2. Moderately increased left ventricular septal thickness.   3. Spectral Doppler shows a pseudonormal pattern of left ventricular diastolic filling.   4. The left ventricular posterior wall thickness is moderately increased.   5. There is severe concentric left ventricular hypertrophy.   6. The left atrium is mild to moderately dilated.   7. There is moderate mitral annular calcification.   8. Moderate tricuspid regurgitation visualized.   9. Moderately elevated right ventricular systolic pressure.  10. Compared with the prior exam from 6/19/2023, there are no significant changes except the RVSP has increased from 33mmHg to 52.3mmHg.    - Vascular US UE duplex: 04/2024  CONCLUSIONS:  Right Upper Venous: The subclavian vein demonstrates a normal spontaneous and phasic flow.  Left Upper Venous: There is acute non-occlusive deep vein thrombosis visualized in the brachial and acute non-occlusive superficial venous thrombosis visualized in the basilic veins.    - Renal US complete with doppler   Impression   Bilateral chronic medical renal disease without  hydroureteronephrosis. Doppler evaluation compatible with history of  chronic renal failure on dialysis.     PMH:   As above      SurgHx:   Past Surgical History:   Procedure Laterality Date    APPENDECTOMY  03/07/2017    Appendectomy    CT ABDOMEN ANGIOGRAM W  AND/OR WO IV CONTRAST  2023    CT ABDOMEN ANGIOGRAM W AND/OR WO IV CONTRAST [facility] CT    OTHER SURGICAL HISTORY  2017    Cystoscopy With Pyeloscopy With Removal Of Calculus    OTHER SURGICAL HISTORY  2017    Anoscopy For Polyp Removal    OTHER SURGICAL HISTORY  2021    Arteriovenous fistula creation procedure    OTHER SURGICAL HISTORY  2021    Dialysis tunneled catheter placement    TUBAL LIGATION  2017    Tubal Ligation       Allergies:   Allergies   Allergen Reactions    Iodine Hives, Itching and Unknown    Bee Pollen Unknown    Bioflavonoids Swelling    Citrus And Derivatives Unknown    Codeine Itching, Hives and Unknown     Tolerates percocet   Tolerates percocet    Tolerates percocet    Flowers Itching    Shellfish Containing Products Swelling     SEAFOOD      SocHx:   Lives: with her family in [place] in house   Works: disabled   ADLs: able to cook and clean. Has hard time with stairs, causes SOB   Meds/$: manages own   Tobacco- quit [age] x 0.5  2020/ Alcohol-x/ Drugs - denies     Home Medications:  Proair inhaler   Albuterol nebs   Phoslo 667 mg 2 caps TID   Epoetin joceline 57318 units three times a week   Flonase   Breo Ellipta 100-25 1 puff daily   Gabapentin 300 mg at bedtime   Atarax 25 mg q6H PRN   Protonix 40 mg every day   Miralax every day   Sumatriptan 25 mg Prn   Nephrocaps every day      Cardiac meds:  Eliquis 5 mg BID  Aspirin 81 mg every day   Lipitor 80 mg every day  Coreg 50 mg BID   Clonidine 0.2 mg/24 hr patch- 1 patch on skin every 7 days   Hydralazine 100 mg TID   Imdur 120 mg every day   Nifedipine 90 mg every day   Torsemide 40 mg every day on non HD days   Valsartan 320 mg every day     Objective:  24 Hour Vitals  Temp:  [35.7 °C (96.3 °F)-36.2 °C (97.1 °F)] 35.7 °C (96.3 °F)  Heart Rate:  [68-94] 81  Resp:  [10-32] 14  BP: (171-255)/() 187/98  FiO2 (%):  [24 %-32 %] 24 %    Temp (24hrs), Av.9 °C (96.7 °F), Min:35.7 °C (96.3 °F), Max:36.2 °C  (97.1 °F)     24 hour Intake/Output  No intake or output data in the 24 hours ending 11/09/24 0425     Exam:  General: Resting in bed. Ill appearing. Appears stated age. In no acute distress   HEENT: Normocephalic and atraumatic. EOMI, sclera non-icteric, MMM, distended neck veins   Cardiovascular: RRR, +systolic murmur  Pulmonary: CTAB, on 2 L NC, no increased WOB, not dyspneic to conversation   GI: +BS, soft, non-tender, non-distended  : No suprapubic/ flank pain  Extremities: No LE edema   Skin: warm and dry. No rashes or lesions   Neurologic: CN II-XII grossly intact. No focal neurologic deficit   Psych: Pleasant. Appropriate mood and affect     Labs  CBC  Results from last 72 hours   Lab Units 11/09/24  0320 11/08/24 2030   WBC AUTO x10*3/uL 7.0 5.7   HEMOGLOBIN g/dL 7.9* 9.4*   HEMATOCRIT % 26.1* 29.5*   PLATELETS AUTO x10*3/uL 247 273        BMP  Results from last 72 hours   Lab Units 11/08/24 2030   SODIUM mmol/L 141   POTASSIUM mmol/L 4.2   CHLORIDE mmol/L 98   BUN mg/dL 45*   CREATININE mg/dL 8.96*   MAGNESIUM mg/dL 2.30       Medications   Scheduled Medications  acetaminophen, 975 mg, oral, Once  apixaban, 5 mg, oral, BID  aspirin, 81 mg, oral, Daily  atorvastatin, 80 mg, oral, Nightly  calcium acetate, 1,334 mg, oral, TID  carvedilol, 50 mg, oral, BID  epoetin joceline, 10,000 Units, subcutaneous, Once per day on Monday Wednesday Friday  fluticasone, 2 spray, Each Nostril, Daily  fluticasone furoate-vilanteroL, 1 puff, inhalation, Daily  gabapentin, 300 mg, oral, Nightly  ipratropium-albuteroL, 3 mL, nebulization, q4h  isosorbide mononitrate ER, 120 mg, oral, Daily  polyethylene glycol, 17 g, oral, Daily  vitamin B complex-vitamin C-folic acid, 1 capsule, oral, Daily     Continuous Medications  niCARdipine, 2.5-15 mg/hr, Last Rate: 2.5 mg/hr (11/09/24 0411)     PRN Medications  PRN medications: albuterol, hydrOXYzine HCL, oxygen, pantoprazole          Assessment/Plan:  54 y.o. 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 She is admitted to the CICU for the management of hypertensive emergency with flash pulmonary edema. Suspect 2/2 medication noncompliance. Will plan to resume home coreg overnight and start Cardene gtt with goal -170s. Will plan to engage nephro for iHD in AM. Will reintroduce home BP meds to wean from Cardene gtt.     NEUROLOGIC  #Peripheral neuropathy   - c/w home gabapentin     CARDIOVASCULAR  #HTN emergency with flash pulmonary edema   #HFpEF  #tropenemia, 2/2 TII MI in setting of marked HTN and ESRD   :: home meds: Coreg 50 mg BID, Clonidine 0.2 mg/24 hr patch- 1 patch on skin every 7 days, Hydralazine 100 mg TID , Imdur 120 mg every day , Nifedipine 90 mg every day , Valsartan 320 mg every day   :: TTE 04/2024- LVEF 60-65% with pseudonormal pattern of left ventricular diastolic filling.   :: Renal US 2023 --> Doppler evaluation compatible with history of chronic renal failure on dialysis.   :: Seen by sleep medicine 11/2024 who felt she likely had sleep apnea based on h/p- planning for outpatient sleep study   :: given imdur and hydral in ED   Plan:  - Continue with Imdur every day   - Start home coreg   - Cardende gtt, goal -170s  - Re-introduce home meds in AM to wean from Cardene gtt   - As per her HFpEF not currently on MRA given renal function, could consider SGLT2i- has not been initiated likely 2/2 ESRD on iHD   - Consider optimizing home BP reg, would favor initiation of Entresto   - serum drug screen sent, follow up     #HLD   - c/w home statin, aspirin     PULMONARY  #AHRF  :: s/p IV lasix 40 mg in the ED   :: Home med Torsemide 40 mg every day on non HD days  :: Bedside POCUS with IVC collapsible    :: Impression: likely 2/2 flash pulm edema,  likely 2/2 fluid redistribution as opposed to fluid overload. Clinical picture not endorsing infection (no fevers/leukocytosis)    Plan:  - Control BP as above  - Nephro consult for iHD in AM  - Wean O2 as able     #COPD without acute exacerbation   - continue home Breo Ellipta 100-25 1 puff daily   - Duoneb q4H scheduled, albuterol q2H PRN     RENAL/  #ESRD on iHD T/TH/SAT  - c/w home Nephrocaps , Phoslo   - holding home Torsemide 40 mg every day on non HD days   - Nephro c/s in AM for iHD     GASTROINTESTINAL  #GERD  - c/w home PPI     ENDOCRINE  No active issues     HEME/ONC  #Normocytic anemia, likely 2/2 renal disease  - c/w  Epoetin joceline 61909 units three times a week     #Hx of LUE DVT  :: per chart review appears that brachial vein DVT in setting of PICC --> provoked   Plan:  - c/w eliquis for now, suspect can be discontinued as > 6 month duration of therapy has now been completed     INFECTIOUS DISEASE  No active issues     MSK/DERM  No active issues     F: PRN for euvolemia, caution ESRD on iHD   E: PRN K>4, Mg>2, P>3   N: NPO for now given emesis, reassess in AM   A: PIV     Oxygen: 2L NC   Abx: Ceftriaxone/Azithro (11/08)  Gtts: Cardene     DVT ppx: Eliquis for now   GI ppx: home PPI     Code Status: full code (confirmed on admission)  Surrogate Medical Decision-maker:    Hai Pinedo ([facility]) [phone]

## 2024-11-09 NOTE — HOSPITAL COURSE
Mrs. Stacey Heath is a 54 y.o. 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 She was admitted to the CICU for the management of hypertensive emergency with flash pulmonary edema. Suspect 2/2 resistant hypertension vs medication noncompliance.  Was treated with Nicardipine gtt and resumed some home BP meds. SBP dropped to 130s so Nicardipine was held. Elevated again to 180s-190s, resumed nifedipine 11/9 . She underwent HD 11/9 .     Things to floor to follow up on:   [ ] Holding home Clonidine patch, hydral 100 TID and valsartan: can resume as needed   [ ] Resume home torsemide on non HD days   [ ] Consider MRA or Entresto   [ ] Continue renal recs for HD  [ ] Determine if need to continue Eliquis as it has been >6 months and was previously provoked

## 2024-11-09 NOTE — PROGRESS NOTES
Stacey Heath is a 54 y.o. female on day 0 of admission presenting with Congestive heart failure, unspecified HF chronicity, unspecified heart failure type.      Subjective   Patient seen and evaluated at bedside. No overnight events per nursing staff. Doing better this am compared to yesterday. Able to be weaned off Nicardipine as her BP dropped into the 130s systolic. Resumed some home BP meds but began to increase to 180s-190s. Reports a HA but denies any chest pain, vision changes, lightheadedness, SOB, or any other acute concerns.     Objective     Last Recorded Vitals  /69   Pulse 75   Temp 35.7 °C (96.3 °F) (Temporal)   Resp 20   Wt 60.5 kg (133 lb 6.1 oz)   SpO2 100%   Intake/Output last 3 Shifts:    Intake/Output Summary (Last 24 hours) at 11/9/2024 1253  Last data filed at 11/9/2024 1230  Gross per 24 hour   Intake 459.58 ml   Output --   Net 459.58 ml       Admission Weight  Weight: 55.3 kg (122 lb) (11/08/24 1951)    Daily Weight  11/09/24 : 60.5 kg (133 lb 6.1 oz)    Image Results  XR chest 1 view  Narrative: Interpreted By:  Finkelstein, Evan, and Ritchie Brandon   STUDY:  XR CHEST 1 VIEW;  11/8/2024 11:55 pm      INDICATION:  Signs/Symptoms:acutely worsening SOB, assess for interval development  of pulmonary edema.      COMPARISON:      Chest x-ray 11/08/2024, 8:40 p.m./8:22 p.m.      ACCESSION NUMBER(S):  [id number]      ORDERING CLINICIAN:  JACQUES VILLA      FINDINGS:  AP radiograph of the chest was provided.      CARDIOMEDIASTINAL SILHOUETTE:  Cardiomediastinal silhouette is stable in size and configuration.      LUNGS:  Similar prominent interstitial markings and bibasilar opacities.  Small left-sided pleural effusion. No pneumothorax.      ABDOMEN:  No remarkable upper abdominal findings.      BONES:  No acute osseous changes.      Impression: 1.  Prominent interstitial markings and bibasilar airspace opacities  with similar aeration compared to prior imaging.  2. Small  left-sided pleural effusion.      I personally reviewed the images/study and I agree with the findings  as stated by resident Addy Coyne. This study was interpreted  at [facility], [place], [place].      MACRO:  None      Signed by: Evan Finkelstein 11/9/2024 12:22 AM  Dictation workstation:   [id number]      Physical Exam  GENERAL:  In no acute distress  NEUROLOGIC:  A and O x 3. Cranial nerves 2 through 12 grossly intact with no gait disturbances. Intact motor and sensory systems, with normal reflexes and coordination.    HEENT:  Pupils are equal, round, and reactive to light and accommodation. Extraocular motion intact.    MOUTH: MMM, no lesions observed  CARDIAC: S1 + S2, RRR, no M/R/G.    LUNGS: Chest with mild crackles appreciated   ABDOMEN: Soft, non-tender to palpation. No organomegaly. Normal BS in 4Q, No rebound or guarding.  EXTREMITIES:  Moving x4 equally and spontaneously with no joint warmth or swelling.  RUE AVF with palpable thrill and audible bruit   SKIN: Clean, warm, dry, and intact with normal skin turgor.  PSYCH: Appropriate mood and behavior.    Scheduled medications  apixaban, 5 mg, oral, BID  aspirin, 81 mg, oral, Daily  atorvastatin, 80 mg, oral, Nightly  calcium acetate, 1,334 mg, oral, TID  carvedilol, 50 mg, oral, BID  epoetin joceline, 10,000 Units, subcutaneous, Once per day on Monday Wednesday Friday  fluticasone, 2 spray, Each Nostril, Daily  fluticasone furoate-vilanteroL, 1 puff, inhalation, Daily  gabapentin, 300 mg, oral, Nightly  ipratropium-albuteroL, 3 mL, nebulization, TID after meals  isosorbide mononitrate ER, 120 mg, oral, Daily  NIFEdipine ER, 90 mg, oral, Daily before breakfast  polyethylene glycol, 17 g, oral, Daily  vitamin B complex-vitamin C-folic acid, 1 capsule, oral, Daily      Continuous medications     PRN medications  PRN medications: acetaminophen, albuterol, hydrOXYzine HCL, oxygen, pantoprazole    Results for orders  placed or performed during the hospital encounter of 11/08/24 (from the past 24 hours)   Blood Gas Venous Full Panel Unsolicited   Result Value Ref Range    POCT pH, Venous 7.38 7.33 - 7.43 pH    POCT pCO2, Venous 54 (H) 41 - 51 mm Hg    POCT pO2, Venous 28 (L) 35 - 45 mm Hg    POCT SO2, Venous 33 (L) 45 - 75 %    POCT Oxy Hemoglobin, Venous 32.1 (L) 45.0 - 75.0 %    POCT Hematocrit Calculated, Venous 30.0 (L) 36.0 - 46.0 %    POCT Sodium, Venous 141 136 - 145 mmol/L    POCT Potassium, Venous 4.5 3.5 - 5.3 mmol/L    POCT Chloride, Venous 102 98 - 107 mmol/L    POCT Ionized Calicum, Venous 1.23 1.10 - 1.33 mmol/L    POCT Glucose, Venous 97 74 - 99 mg/dL    POCT Lactate, Venous 0.8 0.4 - 2.0 mmol/L    POCT Base Excess, Venous 5.7 (H) -2.0 - 3.0 mmol/L    POCT HCO3 Calculated, Venous 31.9 (H) 22.0 - 26.0 mmol/L    POCT Hemoglobin, Venous 9.9 (L) 12.0 - 16.0 g/dL    POCT Anion Gap, Venous 12.0 10.0 - 25.0 mmol/L    Patient Temperature 37.0 degrees Celsius   CBC and Auto Differential   Result Value Ref Range    WBC 5.7 4.4 - 11.3 x10*3/uL    nRBC 0.0 0.0 - 0.0 /100 WBCs    RBC 3.19 (L) 4.00 - 5.20 x10*6/uL    Hemoglobin 9.4 (L) 12.0 - 16.0 g/dL    Hematocrit 29.5 (L) 36.0 - 46.0 %    MCV 93 80 - 100 fL    MCH 29.5 26.0 - 34.0 pg    MCHC 31.9 (L) 32.0 - 36.0 g/dL    RDW 18.2 (H) 11.5 - 14.5 %    Platelets 273 150 - 450 x10*3/uL    Neutrophils % 57.2 40.0 - 80.0 %    Immature Granulocytes %, Automated 1.1 (H) 0.0 - 0.9 %    Lymphocytes % 20.0 13.0 - 44.0 %    Monocytes % 6.8 2.0 - 10.0 %    Eosinophils % 13.5 0.0 - 6.0 %    Basophils % 1.4 0.0 - 2.0 %    Neutrophils Absolute 3.26 1.20 - 7.70 x10*3/uL    Immature Granulocytes Absolute, Automated 0.06 0.00 - 0.70 x10*3/uL    Lymphocytes Absolute 1.14 (L) 1.20 - 4.80 x10*3/uL    Monocytes Absolute 0.39 0.10 - 1.00 x10*3/uL    Eosinophils Absolute 0.77 (H) 0.00 - 0.70 x10*3/uL    Basophils Absolute 0.08 0.00 - 0.10 x10*3/uL   Magnesium   Result Value Ref Range    Magnesium  2.30 1.60 - 2.40 mg/dL   Lipase   Result Value Ref Range    Lipase 24 9 - 82 U/L   Comprehensive metabolic panel   Result Value Ref Range    Glucose 86 74 - 99 mg/dL    Sodium 141 136 - 145 mmol/L    Potassium 4.2 3.5 - 5.3 mmol/L    Chloride 98 98 - 107 mmol/L    Bicarbonate 27 21 - 32 mmol/L    Anion Gap 20 10 - 20 mmol/L    Urea Nitrogen 45 (H) 6 - 23 mg/dL    Creatinine 8.96 (H) 0.50 - 1.05 mg/dL    eGFR 5 (L) >60 mL/min/1.73m*2    Calcium 10.1 8.6 - 10.6 mg/dL    Albumin 4.5 3.4 - 5.0 g/dL    Alkaline Phosphatase 117 (H) 33 - 110 U/L    Total Protein 8.3 (H) 6.4 - 8.2 g/dL    AST 20 9 - 39 U/L    Bilirubin, Total 0.5 0.0 - 1.2 mg/dL    ALT 9 7 - 45 U/L   B-Type Natriuretic Peptide   Result Value Ref Range    BNP 2,229 (H) 0 - 99 pg/mL   Troponin I, High Sensitivity, Initial   Result Value Ref Range    Troponin I, High Sensitivity (CMC) 41 (H) 0 - 34 ng/L   Light Blue Top   Result Value Ref Range    Extra Tube Hold for add-ons.    Sars-CoV-2 PCR   Result Value Ref Range    Coronavirus 2019, PCR Not Detected Not Detected   Influenza A, and B PCR   Result Value Ref Range    Flu A Result Not Detected Not Detected    Flu B Result Not Detected Not Detected   ECG 12 lead   Result Value Ref Range    Ventricular Rate 85 BPM    Atrial Rate 85 BPM    ND Interval 148 ms    QRS Duration 82 ms    QT Interval 370 ms    QTC Calculation(Bazett) 440 ms    P Axis 41 degrees    R Axis 5 degrees    T Axis 152 degrees    QRS Count 14 beats    Q Onset 219 ms    P Onset 145 ms    P Offset 195 ms    T Offset 404 ms    QTC Fredericia 415 ms   Troponin, High Sensitivity, 1 Hour   Result Value Ref Range    Troponin I, High Sensitivity (CMC) 39 (H) 0 - 34 ng/L   ECG 12 lead   Result Value Ref Range    Ventricular Rate 86 BPM    Atrial Rate 86 BPM    ND Interval 150 ms    QRS Duration 84 ms    QT Interval 366 ms    QTC Calculation(Bazett) 437 ms    P Axis 43 degrees    R Axis 5 degrees    T Axis 150 degrees    QRS Count 14 beats    Q Onset  219 ms    P Onset 144 ms    P Offset 194 ms    T Offset 402 ms    QTC Fredericia 412 ms   Blood Gas Venous Full Panel   Result Value Ref Range    POCT pH, Venous 7.35 7.33 - 7.43 pH    POCT pCO2, Venous 54 (H) 41 - 51 mm Hg    POCT pO2, Venous 36 35 - 45 mm Hg    POCT SO2, Venous 48 45 - 75 %    POCT Oxy Hemoglobin, Venous 47.6 45.0 - 75.0 %    POCT Hematocrit Calculated, Venous 24.0 (L) 36.0 - 46.0 %    POCT Sodium, Venous 137 136 - 145 mmol/L    POCT Potassium, Venous 5.3 3.5 - 5.3 mmol/L    POCT Chloride, Venous 103 98 - 107 mmol/L    POCT Ionized Calicum, Venous 1.21 1.10 - 1.33 mmol/L    POCT Glucose, Venous 176 (H) 74 - 99 mg/dL    POCT Lactate, Venous 0.6 0.4 - 2.0 mmol/L    POCT Base Excess, Venous 3.6 (H) -2.0 - 3.0 mmol/L    POCT HCO3 Calculated, Venous 29.8 (H) 22.0 - 26.0 mmol/L    POCT Hemoglobin, Venous 7.9 (L) 12.0 - 16.0 g/dL    POCT Anion Gap, Venous 10.0 10.0 - 25.0 mmol/L    Patient Temperature 37.0 degrees Celsius    FiO2 32 %   TSH with reflex to Free T4 if abnormal   Result Value Ref Range    Thyroid Stimulating Hormone 3.55 0.44 - 3.98 mIU/L   Lipid Panel   Result Value Ref Range    Cholesterol 108 0 - 199 mg/dL    HDL-Cholesterol 45.1 mg/dL    Cholesterol/HDL Ratio 2.4     LDL Calculated 54 <=99 mg/dL    VLDL 9 0 - 40 mg/dL    Triglycerides 46 0 - 149 mg/dL    Non HDL Cholesterol 63 0 - 149 mg/dL   Hemoglobin A1C   Result Value Ref Range    Hemoglobin A1C 4.7 See comment %    Estimated Average Glucose 88 Not Established mg/dL   Type And Screen   Result Value Ref Range    ABO TYPE AB     Rh TYPE NEG     ANTIBODY SCREEN NEG    Lactate   Result Value Ref Range    Lactate 0.6 0.4 - 2.0 mmol/L   Phosphorus   Result Value Ref Range    Phosphorus 6.1 (H) 2.5 - 4.9 mg/dL   Magnesium   Result Value Ref Range    Magnesium 2.38 1.60 - 2.40 mg/dL   Comprehensive Metabolic Panel   Result Value Ref Range    Glucose 102 (H) 74 - 99 mg/dL    Sodium 139 136 - 145 mmol/L    Potassium 6.0 (H) 3.5 - 5.3 mmol/L     Chloride 100 98 - 107 mmol/L    Bicarbonate 26 21 - 32 mmol/L    Anion Gap 19 10 - 20 mmol/L    Urea Nitrogen 51 (H) 6 - 23 mg/dL    Creatinine 9.32 (H) 0.50 - 1.05 mg/dL    eGFR 5 (L) >60 mL/min/1.73m*2    Calcium 9.2 8.6 - 10.6 mg/dL    Albumin 3.8 3.4 - 5.0 g/dL    Alkaline Phosphatase 90 33 - 110 U/L    Total Protein 6.8 6.4 - 8.2 g/dL    AST 37 9 - 39 U/L    Bilirubin, Total 0.5 0.0 - 1.2 mg/dL    ALT 12 7 - 45 U/L   CBC and Auto Differential   Result Value Ref Range    WBC 7.0 4.4 - 11.3 x10*3/uL    nRBC 0.0 0.0 - 0.0 /100 WBCs    RBC 2.68 (L) 4.00 - 5.20 x10*6/uL    Hemoglobin 7.9 (L) 12.0 - 16.0 g/dL    Hematocrit 26.1 (L) 36.0 - 46.0 %    MCV 97 80 - 100 fL    MCH 29.5 26.0 - 34.0 pg    MCHC 30.3 (L) 32.0 - 36.0 g/dL    RDW 18.6 (H) 11.5 - 14.5 %    Platelets 247 150 - 450 x10*3/uL    Neutrophils % 65.5 40.0 - 80.0 %    Immature Granulocytes %, Automated 1.0 (H) 0.0 - 0.9 %    Lymphocytes % 14.1 13.0 - 44.0 %    Monocytes % 7.6 2.0 - 10.0 %    Eosinophils % 10.8 0.0 - 6.0 %    Basophils % 1.0 0.0 - 2.0 %    Neutrophils Absolute 4.55 1.20 - 7.70 x10*3/uL    Immature Granulocytes Absolute, Automated 0.07 0.00 - 0.70 x10*3/uL    Lymphocytes Absolute 0.98 (L) 1.20 - 4.80 x10*3/uL    Monocytes Absolute 0.53 0.10 - 1.00 x10*3/uL    Eosinophils Absolute 0.75 (H) 0.00 - 0.70 x10*3/uL    Basophils Absolute 0.07 0.00 - 0.10 x10*3/uL   Coagulation Screen   Result Value Ref Range    Protime 14.6 (H) 9.8 - 12.8 seconds    INR 1.3 (H) 0.9 - 1.1    aPTT 38 27 - 38 seconds     *Note: Due to a large number of results and/or encounters for the requested time period, some results have not been displayed. A complete set of results can be found in Results Review.     XR chest 1 view    Result Date: 11/9/2024  Interpreted By:  Finkelstein, Evan, and Ritchie Brandon STUDY: XR CHEST 1 VIEW;  11/8/2024 11:55 pm   INDICATION: Signs/Symptoms:acutely worsening SOB, assess for interval development of pulmonary edema.   COMPARISON:    Chest x-ray 11/08/2024, 8:40 p.m./8:22 p.m.   ACCESSION NUMBER(S): [id number]   ORDERING CLINICIAN: JACQUES VILLA   FINDINGS: AP radiograph of the chest was provided.   CARDIOMEDIASTINAL SILHOUETTE: Cardiomediastinal silhouette is stable in size and configuration.   LUNGS: Similar prominent interstitial markings and bibasilar opacities. Small left-sided pleural effusion. No pneumothorax.   ABDOMEN: No remarkable upper abdominal findings.   BONES: No acute osseous changes.       1.  Prominent interstitial markings and bibasilar airspace opacities with similar aeration compared to prior imaging. 2. Small left-sided pleural effusion.   I personally reviewed the images/study and I agree with the findings as stated by resident Addy Coyne. This study was interpreted at [place], [place].   MACRO: None   Signed by: Evan Finkelstein 11/9/2024 12:22 AM Dictation workstation:   [id number]    ECG 12 lead    Result Date: 11/8/2024  Normal sinus rhythm Left ventricular hypertrophy with repolarization abnormality ( R in aVL , [place] product ) Abnormal ECG When compared with ECG of 08-NOV-2024 23:48, No significant change was found See ED provider note for full interpretation and clinical correlation Confirmed by Bibi Toussaint ([id number]) on 11/8/2024 11:52:44 PM    ECG 12 lead    Result Date: 11/8/2024  Normal sinus rhythm Left ventricular hypertrophy with repolarization abnormality ( R in aVL , [place] product ) Abnormal ECG When compared with ECG of 08-NOV-2024 23:48, No significant change was found See ED provider note for full interpretation and clinical correlation Confirmed by Bibi Toussaint ([id number]) on 11/8/2024 11:52:39 PM    XR chest 1 view    Result Date: 11/8/2024  Interpreted By:  Finkelstein, Evan,  and Rossy Kidd STUDY: XR CHEST 1 VIEW;  11/8/2024 8:44 pm   INDICATION: Signs/Symptoms:SOB.   COMPARISON: Same day chest radiograph time stamped 8:22 p.m.    ACCESSION NUMBER(S): [id number]   ORDERING CLINICIAN: FLORENTIN BRANNON   FINDINGS: Lateral radiograph of the chest was provided.   CARDIOMEDIASTINAL SILHOUETTE: The cardiomediastinal silhouette is normal in size and configuration.   LUNGS: Small left pleural effusion. Interstitial opacities as previously described.   ABDOMEN: No remarkable upper abdominal findings.   BONES: No acute osseous abnormality.       1. Small left pleural effusion and interstitial markings as previously described. No additional abnormality on limited single lateral view.     I personally reviewed the image(s)/study and resident interpretation as stated by Dr. Marti Blair MD. I agree with the findings as stated. This study was interpreted at [facility], [place], [place].   MACRO: None   Signed by: Evan Finkelstein 11/8/2024 9:06 PM Dictation workstation:   [id number]    XR chest 1 view    Result Date: 11/8/2024  Interpreted By:  Finkelstein, Jonas,  and Aragon Marti STUDY: XR CHEST 1 VIEW;  11/8/2024 8:27 pm   INDICATION: Signs/Symptoms:hypoxia.   COMPARISON: Chest radiograph 11/01/2024   ACCESSION NUMBER(S): [id number]   ORDERING CLINICIAN: JACQUES VILLA   FINDINGS: AP radiograph of the chest was provided.   Stenting material in the right subclavian region.   CARDIOMEDIASTINAL SILHOUETTE: Cardiomediastinal silhouette is stable in size and configuration. Mild calcifications of the aortic arch.   LUNGS: Prominent interstitial markings with bibasilar opacities. Mild blunting of the left costophrenic angle. No pneumothorax.   ABDOMEN: No remarkable upper abdominal findings.   BONES: No acute osseous abnormality.       1. Prominent interstitial markings and bibasilar opacities which may represent mixed interstitial/alveolar edema with bibasilar atelectasis or pneumonia not excluded. 2. Small left pleural effusion.     I personally reviewed the image(s)/study and resident interpretation as  stated by Dr. Marti Blair MD. I agree with the findings as stated. This study was interpreted at [facility], [place], [place].   MACRO: None   Signed by: Evan Finkelstein 11/8/2024 8:39 PM Dictation workstation:   [id number]          Assessment/Plan   54 y.o. female with PMH significant for ESRD 2/2 on dialysis T/Th/Sat via RUE AVF (last complete session 11/07/2024), HTN, HFpEF, COPD, brachial DVT on Eliquis who presented for [facility] ED with a chief complaint of SOB, found to have SBP >200 and briefly required BiPAP in the ED. She is admitted to the CICU for the management of hypertensive emergency with flash pulmonary edema. Suspect 2/2 medication noncompliance.  Was treated with Nicardipine gtt and resume some home BP meds. SBP dropped to 130s so stopped Nicardipine. Elevated again to 180s-190s, resumed nifedipine 11/9 and getting HD today. Can likely be transferred to the floor after     Updates 11/09/24  - Resume Nifedipine 90  - Getting HD today  - Can possible be transferred to the floor after HD and BP reasonably controlled   - Still holding valsartan, clonidine, and hydral from home meds list      NEUROLOGIC  #Peripheral neuropathy   - c/w home gabapentin      CARDIOVASCULAR  #HTN emergency with flash pulmonary edema   #HFpEF  #tropenemia, 2/2 TII MI in setting of marked HTN and ESRD   :: home meds: Coreg 50 mg BID, Clonidine 0.2 mg/24 hr patch- 1 patch on skin every 7 days, Hydralazine 100 mg TID , Imdur 120 mg every day , Nifedipine 90 mg every day , Valsartan 320 mg every day   :: TTE 04/2024- LVEF 60-65% with pseudonormal pattern of left ventricular diastolic filling.   :: Renal US 2023 --> Doppler evaluation compatible with history of chronic renal failure on dialysis.   :: Seen by sleep medicine 11/2024 who felt she likely had sleep apnea based on h/p- planning for outpatient sleep study   :: given imdur and hydral in ED   Plan:  - Continue with Imdur every day,  and coreg 50 BID  - Resumed nifedipine 90 this am   - Can resume other BP meds as above as needed   - Weaned off Nicardipine gtt  - Getting HD today   - As per her HFpEF not currently on MRA given renal function, could consider SGLT2i- has not been initiated likely 2/2 ESRD on iHD   - Consider optimizing home BP reg, would favor initiation of Entresto   - serum drug screen sent, follow up      #HLD   - c/w home statin, aspirin      PULMONARY  #AHRF  :: s/p IV lasix 40 mg in the ED   :: Home med Torsemide 40 mg every day on non HD days  :: Bedside POCUS with IVC collapsible    :: Impression: likely 2/2 flash pulm edema, likely 2/2 fluid redistribution as opposed to fluid overload. Clinical picture not endorsing infection (no fevers/leukocytosis)    Plan:  - Control BP as above  - Nephro consulted for HD today   - Wean O2 as able      #COPD without acute exacerbation   - continue home Breo Ellipta 100-25 1 puff daily   - Duoneb q4H scheduled, albuterol q2H PRN      RENAL/  #ESRD on iHD T/TH/SAT  - c/w home Nephrocaps , Phoslo   - holding home Torsemide 40 mg every day on non HD days   - Nephro c/s in AM for iHD      GASTROINTESTINAL  #GERD  - c/w home PPI      ENDOCRINE  No active issues      HEME/ONC  #Normocytic anemia, likely 2/2 renal disease  - c/w  Epoetin joceline 49791 units three times a week      #Hx of LUE DVT  :: per chart review appears that brachial vein DVT in setting of PICC --> provoked   Plan:  - c/w eliquis for now, suspect can be discontinued as > 6 month duration of therapy has now been completed. Will defer to floor team for final decision      INFECTIOUS DISEASE  No active issues      MSK/DERM  No active issues      F: PRN for euvolemia, caution ESRD on iHD   E: PRN K>4, Mg>2, P>3   N: Renal diet  A: PIV, RUE AVF   Oxygen: 2L NC   Abx: Ceftriaxone/Azithro (11/08)  Gtts: None    DVT ppx: Eliquis for now   GI ppx: home PPI   Code Status: full code (confirmed on admission)  Surrogate Medical  Decision-maker:    Hai Pinedo ([facility]) [phone]        Bowen Paez MD

## 2024-11-09 NOTE — ED TRIAGE NOTES
Pt presents to ED with shortness of breath. Pt states shortness of breath started x 1 hours ago, states she was sleeping and woke up SOB. Pt also c/o HA and palpitations. Pt denies home O2 use but states she was sent home with oxygen to use as needed last time she was admitted. Pt has hx of ESRD (T/Th/Sat) (yesterday full session), HTN, HF, DM2, COPD, DVT (eliquis). Pt speaking in one-two word sentences in triage, sating 97% on RA. Pt states she is compliant with meds at home.

## 2024-11-09 NOTE — PROGRESS NOTES
I received this patient during signout at 2300.   Please see previous provider's note for detailed H&P, labs and imaging.      Under my care, patient reassessed and remains clinically stable. See ED course below.    @  ED Course as of 11/09/24 0213   Sat Nov 09, 2024   0016 I assumed care of this patient at 2300.  Patient required BiPAP initiation due to shortness of breath however was not hypoxic.  Troponins 41 and 39.  EKG repeated and similar to prior.  No dynamic changes.  I did reevaluate patient and she states she has chest pain and has difficulty breathing.  She is on IV antibiotics for presumed community-acquired pneumonia.  Patient was discussed with admission coordinator prior to my shift however given BiPAP initiation I believe she may require escalation of care to ICU versus stepdown.  Multifactorial diagnoses considered at this time including fluid overload, CHF exacerbation, pneumonia.  Given signs of predominant fluid overload I will discuss with cardiac ICU as opposed to medical ICU at this time.  I did do point-of-care ultrasound EF relatively normal approximately 40%, diffuse B-lines noted. [SA]   0018 I also considered flash pulmonary edema and will initiate nitroglycerin drip as needed however patient's blood pressure is improving on BiPAP.  Goal is 20% reduction in systolics, initial systolic 229/85 therefore goal pressure around 190 systolic [SA]   0049 Repeat CXR reviewed:  IMPRESSION:  1.  Prominent interstitial markings and bibasilar airspace opacities  with similar aeration compared to prior imaging.  2. Small left-sided pleural effusion.   [SA]   0056 Venous full panel obtained with pH 7.35, pCO2 54, lactate within normal limits at 0.6 [SA]   0213 Accepted by CICU attending [SA]      ED Course User Index  [SA] Karin Will DO   @    Disposition: Admitted        Patient seen and staffed with attending physician.     Karin Will DO   EM PGY3

## 2024-11-09 NOTE — CONSULTS
Reason For Consult  ESRD Hypertensive emergency    History Of Present Illness  54 y.o. 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 She has had recurrent episodes of hypertension and admission for urgent dialysis sessions.    Shortness of breath that started this morning after being noticed by her  about her breathing difficulty.  She reports watching her sodium intake and not eating sodium rich foods at home. She does not add salt to her foods. She reports taking her medication as prescribed.    Was hypertensive on arrival of >220 systolic. Was placed on Cardene drip and admitted to CICU. Her last dialysis day was 11/7.    Currently eating breakfast and denies any shortness of breath, nausea, vomiting, or cough.     Past Medical History  She has a past medical history of Bacteremia due to methicillin resistant Staphylococcus aureus (07/08/2024), Cardiac/pericardial tamponade (01/20/2024), CHF (congestive heart failure), COPD (chronic obstructive pulmonary disease) (Multi), Coronary artery disease, Disorder of sweat glands (02/25/2023), Dry eye syndrome of bilateral lacrimal glands (03/07/2017), ESRD (end stage renal disease) (Multi), Essential (primary) hypertension (12/27/2022), History of acute pancreatitis (12/21/2020), Low grade squamous intraepithelial lesion (LGSIL) on cervicovaginal cytologic smear (02/25/2023), Migraines, 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 (02/25/2023), and Type 2 myocardial infarction (Multi) (01/20/2024).    Surgical History  She has a past surgical history that includes Tubal ligation (03/07/2017); Other surgical history (03/07/2017); Appendectomy (03/07/2017); Other surgical history (03/07/2017); Other surgical history (12/08/2021); Other surgical history (12/08/2021); and CT angio abdomen w and or wo IV IV contrast (4/23/2023).     Social History  She reports that she  "has quit smoking. Her smoking use included cigarettes. She has been exposed to tobacco smoke. She has never used smokeless tobacco. She reports that she does not currently use alcohol. She reports that she does not currently use drugs after having used the following drugs: Cocaine and Marijuana.    Family History  Family History   Problem Relation Name Age of Onset    Other (Cerebrovascular Accident) Father      Heart failure Paternal Grandmother      Breast cancer Paternal Grandmother      Other (Primary Cervical Cancer) Paternal Grandmother      Diabetes Paternal Grandfather      Breast cancer Father's Sister      Ovarian cancer Father's Sister          Allergies  Iodine, Bee pollen, Bioflavonoids, Citrus and derivatives, Codeine, Flowers, and Shellfish containing products     Physical Exam  GEN: NAD  RESP: CTABL  CVS: Hypertensive  EXT: No edema       I&O 24HR    Intake/Output Summary (Last 24 hours) at 11/9/2024 1311  Last data filed at 11/9/2024 1300  Gross per 24 hour   Intake 459.58 ml   Output --   Net 459.58 ml     Vitals 24HR  Heart Rate:  [68-94]   Temp:  [35.7 °C (96.3 °F)-36.1 °C (97 °F)]   Resp:  [10-32]   BP: (139-255)/()   Height:  [139.7 cm (4' 7\")]   Weight:  [55.3 kg (122 lb)-60.5 kg (133 lb 6.1 oz)]   SpO2:  [90 %-100 %]     Relevant Results  Lab Results   Component Value Date    GLUCOSE 102 (H) 11/09/2024    CALCIUM 9.2 11/09/2024     11/09/2024    K 6.0 (H) 11/09/2024    CO2 26 11/09/2024     11/09/2024    BUN 51 (H) 11/09/2024    CREATININE 9.32 (H) 11/09/2024          Assessment/Plan     Stacey Heath is a 54-year-old female with history of ESRD on HD TTS. She was recently discharged from  and is being readmitted for Hypertensive emergency and fluid overload with hypoxic respiratory failure. Nephrology is consulted for facilitation of HD    #Hypertensive emergency  On cardene drip. Improving. HD pending    #ESRD on TTS  - Admitted for shortness of breath, recently " discharged from   She gets dialysis TTS at [facility]/Dr Barraza is primary nephrologist. Last HD 10/5 no post weight recorded; DW 51; ELI AVF  K 6.0; Hgb 7.9  /105; EPOGEN 10,00 units thrice weekly. Phos >6.0 on Phoslo    Recommendations:  -HD today. 3L UF goal.  -Plan to continue TTS schedule after UF challenge today and daily reassess HD needs while in CICU.  -Consider further HTN eval considering recurrent episodes of HTN despite reported adherence to regimen.   -Seen and discussed with Dr. Estefany Morrison MD

## 2024-11-09 NOTE — ED PROVIDER NOTES
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  Patient reports that she has been intermittently requiring 2 L of oxygen especially with ambulation.  Patient does endorse that she has had increasing shortness of breath over the past couple of weeks and is being worked up for new oxygen requirement.  She reports that she was sleeping and woke up and felt short of breath but appears more comfortable after being placed on 2 L by EMS.  She endorses left-sided chest pain without any radiation of the pain.  Patient denies any diaphoresis, syncopal episodes but has worsening exertional dyspnea.  Patient denies any orthopnea.  Patient does not have a baseline oxygen requirement.  Patient endorses rhinorrhea and a cough that has not been productive and denies any hemoptysis.  She denies any nausea or vomiting but does endorse diarrhea for the past day.  She denies any recent sick contacts.  Patient had a full run of dialysis yesterday without any difficulty.  Patient reports that she has been taking all of her medications as directed and took all of her morning doses of her medicines.  Patient was placed on 2 L of oxygen and is satting well without any significant tachypnea noted.  Blood pressure is elevated at 200s over 85.    Limitations to History: none  Additional History Obtained from: EMS    PMHx/PSHx:  Per HPI.   - has a past medical history of Bacteremia due to methicillin resistant Staphylococcus aureus (07/08/2024), Cardiac/pericardial tamponade (01/20/2024), CHF (congestive heart failure), COPD (chronic obstructive pulmonary disease) (Multi), Coronary artery disease, Disorder of sweat glands (02/25/2023), Dry eye syndrome of bilateral lacrimal glands (03/07/2017), ESRD (end stage renal disease) (Multi), Essential  (primary) hypertension (12/27/2022), History of acute pancreatitis (12/21/2020), Low grade squamous intraepithelial lesion (LGSIL) on cervicovaginal cytologic smear (02/25/2023), Migraines, 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 (02/25/2023), and Type 2 myocardial infarction (Multi) (01/20/2024).  - has a past surgical history that includes Tubal ligation (03/07/2017); Other surgical history (03/07/2017); Appendectomy (03/07/2017); Other surgical history (03/07/2017); Other surgical history (12/08/2021); Other surgical history (12/08/2021); and CT angio abdomen w and or wo IV IV contrast (4/23/2023).    Social History:  - Tobacco:  reports that she has quit smoking. Her smoking use included cigarettes. She has been exposed to tobacco smoke. She has never used smokeless tobacco.   - Alcohol:  reports that she does not currently use alcohol.   - Drugs:  reports that she does not currently use drugs after having used the following drugs: Cocaine and Marijuana.     Medications: Reviewed in EMR.     Allergies:  Iodine, Bee pollen, Bioflavonoids, Citrus and derivatives, Codeine, Flowers, and Shellfish containing products    ???????????????????????????????????????????????????????????????  Triage Vitals:  T 36.1 °C (97 °F)  HR 88  BP (!) 229/85  RR (!) 22  O2 97 % None (Room air)    Physical Exam  Vitals and nursing note reviewed.   Constitutional:       General: She is not in acute distress.     Appearance: She is well-developed.   HENT:      Head: Normocephalic and atraumatic.      Mouth/Throat:      Mouth: Mucous membranes are dry.      Pharynx: Oropharynx is clear.   Eyes:      Extraocular Movements: Extraocular movements intact.      Conjunctiva/sclera: Conjunctivae normal.      Pupils: Pupils are equal, round, and reactive to light.   Neck:      Vascular: No JVD.   Cardiovascular:      Rate and Rhythm: Normal rate and regular rhythm.      Heart sounds: No murmur heard.  Pulmonary:      Effort: Pulmonary effort is  normal. Tachypnea present. No accessory muscle usage or respiratory distress.      Breath sounds: Normal breath sounds. No wheezing, rhonchi or rales.   Abdominal:      General: There is no distension.      Palpations: Abdomen is soft.      Tenderness: There is no abdominal tenderness. There is no guarding or rebound.   Musculoskeletal:         General: No swelling or tenderness. Normal range of motion.      Cervical back: Neck supple.      Right lower leg: No edema.      Left lower leg: No edema.   Skin:     General: Skin is warm and dry.      Capillary Refill: Capillary refill takes less than 2 seconds.   Neurological:      Mental Status: She is alert and oriented to person, place, and time.      Sensory: No sensory deficit.      Motor: No weakness.      Gait: Gait normal.   Psychiatric:         Mood and Affect: Mood is anxious.       ???????????????????????????????????????????????????????????????  EKG (per my interpretation):  Sinus rhythm with a rate of 85.  No VA QRS prolongation.  No acute ST elevations noted.  T wave inversions noted in lateral leads.  No ALFREDA.  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  Differentials considered but not limited to STEMI, pneumonia due to COVID URI COVID flu, COVID, CHF exacerbation, COPD exacerbation, ACS.    Based on patient's history and physical exam, broad workup was initiated. Patient's lab work appears largely at baseline and a BNP of 2229 is not significantly off from her baseline that appears to vary between 1500 - 3000.  Patient's lipase is normal and troponin is also at baseline.  Patient does not have an elevated white count and viral swabs are negative.  She was satting well on her 2 L and appeared to have noted on chest x-ray.  Given cough,  shortness of breath and new oxygen requirement, patient was covered with antibiotics for treatment of community-acquired pneumonia.  She was given her home regimen of antihypertensives that did not significantly affect her blood pressure.  Towards the end of my shift, patient started to become significantly tachypneic and felt very short of breath despite not having any desaturations.  Patient does not have pneumonia or signs of external volume overload such as pitting edema or JVD.  Patient was placed on BiPAP that did improve her work of breathing.  She has slight elevations in CO2 at 56 with a normal pH no obvious anion gap noted.  Patient was started on nitro drip given persistently high blood pressures.  Patient will require admission to ICU.  She remained stable on her BiPAP settings.  Significantly comfortable.  Patient care was overseen by attending physician agrees with the plan and disposition.    External records reviewed: recent inpatient, clinic, and prior ED notes  Diagnostic imaging independently reviewed/interpreted by me (as reflected in MDM) includes: CXR  Social Determinants Affecting Care: Poor health literacy  Discussion of management with other providers: attending  Prescription Drug Consideration: per inpatient team  Escalation of Care: admission    Impression:   SOB  PNA  CHF exacerbation    Disposition: Handoff      Procedures ? [employer] last updated 11/8/2024 11:56 PM        Tania Sánchez MD  Resident  11/09/24 0014

## 2024-11-10 VITALS
SYSTOLIC BLOOD PRESSURE: 164 MMHG | BODY MASS INDEX: 30.87 KG/M2 | OXYGEN SATURATION: 98 % | RESPIRATION RATE: 15 BRPM | HEIGHT: 55 IN | TEMPERATURE: 95.7 F | WEIGHT: 133.38 LBS | HEART RATE: 88 BPM | DIASTOLIC BLOOD PRESSURE: 54 MMHG

## 2024-11-10 LAB
ALBUMIN SERPL BCP-MCNC: 3.9 G/DL (ref 3.4–5)
ANION GAP SERPL CALC-SCNC: 15 MMOL/L (ref 10–20)
BASOPHILS # BLD AUTO: 0.07 X10*3/UL (ref 0–0.1)
BASOPHILS NFR BLD AUTO: 1.5 %
BUN SERPL-MCNC: 23 MG/DL (ref 6–23)
CALCIUM SERPL-MCNC: 9.6 MG/DL (ref 8.6–10.6)
CHLORIDE SERPL-SCNC: 97 MMOL/L (ref 98–107)
CO2 SERPL-SCNC: 30 MMOL/L (ref 21–32)
CREAT SERPL-MCNC: 5.49 MG/DL (ref 0.5–1.05)
EGFRCR SERPLBLD CKD-EPI 2021: 9 ML/MIN/1.73M*2
EOSINOPHIL # BLD AUTO: 0.55 X10*3/UL (ref 0–0.7)
EOSINOPHIL NFR BLD AUTO: 11.5 %
ERYTHROCYTE [DISTWIDTH] IN BLOOD BY AUTOMATED COUNT: 18.5 % (ref 11.5–14.5)
GLUCOSE SERPL-MCNC: 97 MG/DL (ref 74–99)
HCT VFR BLD AUTO: 30.4 % (ref 36–46)
HGB BLD-MCNC: 9.1 G/DL (ref 12–16)
IMM GRANULOCYTES # BLD AUTO: 0.05 X10*3/UL (ref 0–0.7)
IMM GRANULOCYTES NFR BLD AUTO: 1 % (ref 0–0.9)
LYMPHOCYTES # BLD AUTO: 0.85 X10*3/UL (ref 1.2–4.8)
LYMPHOCYTES NFR BLD AUTO: 17.8 %
MAGNESIUM SERPL-MCNC: 2.23 MG/DL (ref 1.6–2.4)
MCH RBC QN AUTO: 29.5 PG (ref 26–34)
MCHC RBC AUTO-ENTMCNC: 29.9 G/DL (ref 32–36)
MCV RBC AUTO: 99 FL (ref 80–100)
MONOCYTES # BLD AUTO: 0.36 X10*3/UL (ref 0.1–1)
MONOCYTES NFR BLD AUTO: 7.5 %
NEUTROPHILS # BLD AUTO: 2.9 X10*3/UL (ref 1.2–7.7)
NEUTROPHILS NFR BLD AUTO: 60.7 %
NRBC BLD-RTO: 0 /100 WBCS (ref 0–0)
PHOSPHATE SERPL-MCNC: 5.9 MG/DL (ref 2.5–4.9)
PLATELET # BLD AUTO: 210 X10*3/UL (ref 150–450)
POTASSIUM SERPL-SCNC: 4.4 MMOL/L (ref 3.5–5.3)
RBC # BLD AUTO: 3.08 X10*6/UL (ref 4–5.2)
SODIUM SERPL-SCNC: 138 MMOL/L (ref 136–145)
WBC # BLD AUTO: 4.8 X10*3/UL (ref 4.4–11.3)

## 2024-11-10 PROCEDURE — 83735 ASSAY OF MAGNESIUM: CPT

## 2024-11-10 PROCEDURE — 36415 COLL VENOUS BLD VENIPUNCTURE: CPT

## 2024-11-10 PROCEDURE — 85025 COMPLETE CBC W/AUTO DIFF WBC: CPT

## 2024-11-10 PROCEDURE — 99233 SBSQ HOSP IP/OBS HIGH 50: CPT

## 2024-11-10 PROCEDURE — 1100000001 HC PRIVATE ROOM DAILY

## 2024-11-10 PROCEDURE — 2500000001 HC RX 250 WO HCPCS SELF ADMINISTERED DRUGS (ALT 637 FOR MEDICARE OP)

## 2024-11-10 PROCEDURE — 94640 AIRWAY INHALATION TREATMENT: CPT

## 2024-11-10 PROCEDURE — 2500000002 HC RX 250 W HCPCS SELF ADMINISTERED DRUGS (ALT 637 FOR MEDICARE OP, ALT 636 FOR OP/ED)

## 2024-11-10 PROCEDURE — 80069 RENAL FUNCTION PANEL: CPT

## 2024-11-10 ASSESSMENT — PAIN SCALES - GENERAL
PAINLEVEL_OUTOF10: 0 - NO PAIN
PAINLEVEL_OUTOF10: 0 - NO PAIN
PAINLEVEL_OUTOF10: 7
PAINLEVEL_OUTOF10: 3

## 2024-11-10 ASSESSMENT — COGNITIVE AND FUNCTIONAL STATUS - GENERAL
MOBILITY SCORE: 22
HELP NEEDED FOR BATHING: A LITTLE
DRESSING REGULAR LOWER BODY CLOTHING: A LITTLE
DAILY ACTIVITIY SCORE: 18
CLIMB 3 TO 5 STEPS WITH RAILING: A LITTLE
WALKING IN HOSPITAL ROOM: A LITTLE
PERSONAL GROOMING: A LITTLE
TOILETING: A LITTLE
EATING MEALS: A LITTLE
DRESSING REGULAR UPPER BODY CLOTHING: A LITTLE

## 2024-11-10 ASSESSMENT — PAIN - FUNCTIONAL ASSESSMENT
PAIN_FUNCTIONAL_ASSESSMENT: 0-10

## 2024-11-10 NOTE — PROGRESS NOTES
Stacey Heath is a 54 y.o. female on day 1 of admission presenting with Congestive heart failure, unspecified HF chronicity, unspecified heart failure type.    Transfer Note    Mrs. Stacey Heath is a 54 y.o. female with PMH significant for ESRD 2/2 on dialysis T/Th/Sat via RUE AVF (last complete session 11/07/2024), HTN, HFpEF, COPD, brachial DVT on Eliquis who presented for [facility] ED with a chief complaint of SOB, found to have SBP >200 and briefly required BiPAP in the ED. She was admitted to the CICU for the management of hypertensive emergency with flash pulmonary edema. Suspect 2/2 resistant hypertension vs medication noncompliance.  Was treated with Nicardipine gtt and resumed some home BP meds. SBP dropped to 130s so Nicardipine was held. Elevated again to 180s-190s, resumed nifedipine 11/9 . She underwent HD 11/9 .     Things to floor to follow up on:   [ ] Holding home Clonidine patch, hydral 100 TID and valsartan: can resume as needed   [ ] Resume home torsemide on non HD days   [ ] Consider MRA or Entresto   [ ] Continue renal recs for HD  [ ] Determine if need to continue Eliquis as it has been >6 months and was previously provoked     Subjective   Patient seen and evaluated at bedside. No overnight events per nursing staff. The patient denies any chest pain, shortness of breath, vision changes, headache, or lightheadedness, Patient's blood pressure overnight were below 150 systolic.     Objective     Last Recorded Vitals  /59   Pulse 75   Temp 35.3 °C (95.5 °F)   Resp 12   Wt 60.5 kg (133 lb 6.1 oz)   SpO2 95%   Intake/Output last 3 Shifts:    Intake/Output Summary (Last 24 hours) at 11/10/2024 0734  Last data filed at 11/10/2024 0400  Gross per 24 hour   Intake 1059.58 ml   Output 2200 ml   Net -1140.42 ml       Admission Weight  Weight: 55.3 kg (122 lb) (11/08/24 1951)    Daily Weight  11/09/24 : 60.5 kg (133 lb 6.1 oz)    Image Results  XR chest 1 view  Narrative: Interpreted By:   Finkelstein, Evan,  and Stanford Daly   STUDY:  XR CHEST 1 VIEW;  11/8/2024 11:55 pm      INDICATION:  Signs/Symptoms:acutely worsening SOB, assess for interval development  of pulmonary edema.      COMPARISON:      Chest x-ray 11/08/2024, 8:40 p.m./8:22 p.m.      ACCESSION NUMBER(S):  [id number]      ORDERING CLINICIAN:  JACQUES VILLA      FINDINGS:  AP radiograph of the chest was provided.      CARDIOMEDIASTINAL SILHOUETTE:  Cardiomediastinal silhouette is stable in size and configuration.      LUNGS:  Similar prominent interstitial markings and bibasilar opacities.  Small left-sided pleural effusion. No pneumothorax.      ABDOMEN:  No remarkable upper abdominal findings.      BONES:  No acute osseous changes.      Impression: 1.  Prominent interstitial markings and bibasilar airspace opacities  with similar aeration compared to prior imaging.  2. Small left-sided pleural effusion.      I personally reviewed the images/study and I agree with the findings  as stated by resident Addy Coyne. This study was interpreted  at [place], [place].      MACRO:  None      Signed by: Evan Finkelstein 11/9/2024 12:22 AM  Dictation workstation:   [id number]      Physical Exam  Constitutional:       Appearance: Normal appearance.   HENT:      Head: Normocephalic and atraumatic.      Mouth/Throat:      Pharynx: Oropharynx is clear.   Cardiovascular:      Rate and Rhythm: Normal rate and regular rhythm.   Pulmonary:      Effort: Pulmonary effort is normal.      Breath sounds: Normal breath sounds.   Abdominal:      General: Abdomen is flat. Bowel sounds are normal.      Palpations: Abdomen is soft.   Musculoskeletal:         General: Normal range of motion.      Right lower leg: No edema.      Left lower leg: No edema.   Skin:     General: Skin is warm and dry.   Neurological:      General: No focal deficit present.      Mental Status: She is alert and oriented to person,  place, and time.         Scheduled medications  apixaban, 5 mg, oral, BID  aspirin, 81 mg, oral, Daily  atorvastatin, 80 mg, oral, Nightly  calcium acetate, 1,334 mg, oral, TID  carvedilol, 50 mg, oral, BID  epoetin joceline, 10,000 Units, subcutaneous, Once per day on Monday Wednesday Friday  fluticasone, 2 spray, Each Nostril, Daily  fluticasone furoate-vilanteroL, 1 puff, inhalation, Daily  gabapentin, 300 mg, oral, Nightly  hydrALAZINE, 100 mg, oral, TID  ipratropium-albuteroL, 3 mL, nebulization, TID after meals  isosorbide mononitrate ER, 120 mg, oral, Daily  NIFEdipine ER, 90 mg, oral, Daily before breakfast  polyethylene glycol, 17 g, oral, Daily  vitamin B complex-vitamin C-folic acid, 1 capsule, oral, Daily      Continuous medications     PRN medications  PRN medications: acetaminophen, albuterol, hydrOXYzine HCL, oxygen, pantoprazole    Results for orders placed or performed during the hospital encounter of 11/08/24 (from the past 24 hours)   Coagulation Screen   Result Value Ref Range    Protime 14.6 (H) 9.8 - 12.8 seconds    INR 1.3 (H) 0.9 - 1.1    aPTT 38 27 - 38 seconds   CBC and Auto Differential   Result Value Ref Range    WBC 4.8 4.4 - 11.3 x10*3/uL    nRBC 0.0 0.0 - 0.0 /100 WBCs    RBC 3.08 (L) 4.00 - 5.20 x10*6/uL    Hemoglobin 9.1 (L) 12.0 - 16.0 g/dL    Hematocrit 30.4 (L) 36.0 - 46.0 %    MCV 99 80 - 100 fL    MCH 29.5 26.0 - 34.0 pg    MCHC 29.9 (L) 32.0 - 36.0 g/dL    RDW 18.5 (H) 11.5 - 14.5 %    Platelets 210 150 - 450 x10*3/uL    Neutrophils % 60.7 40.0 - 80.0 %    Immature Granulocytes %, Automated 1.0 (H) 0.0 - 0.9 %    Lymphocytes % 17.8 13.0 - 44.0 %    Monocytes % 7.5 2.0 - 10.0 %    Eosinophils % 11.5 0.0 - 6.0 %    Basophils % 1.5 0.0 - 2.0 %    Neutrophils Absolute 2.90 1.20 - 7.70 x10*3/uL    Immature Granulocytes Absolute, Automated 0.05 0.00 - 0.70 x10*3/uL    Lymphocytes Absolute 0.85 (L) 1.20 - 4.80 x10*3/uL    Monocytes Absolute 0.36 0.10 - 1.00 x10*3/uL    Eosinophils  Absolute 0.55 0.00 - 0.70 x10*3/uL    Basophils Absolute 0.07 0.00 - 0.10 x10*3/uL   Renal Function Panel   Result Value Ref Range    Glucose 97 74 - 99 mg/dL    Sodium 138 136 - 145 mmol/L    Potassium 4.4 3.5 - 5.3 mmol/L    Chloride 97 (L) 98 - 107 mmol/L    Bicarbonate 30 21 - 32 mmol/L    Anion Gap 15 10 - 20 mmol/L    Urea Nitrogen 23 6 - 23 mg/dL    Creatinine 5.49 (H) 0.50 - 1.05 mg/dL    eGFR 9 (L) >60 mL/min/1.73m*2    Calcium 9.6 8.6 - 10.6 mg/dL    Phosphorus 5.9 (H) 2.5 - 4.9 mg/dL    Albumin 3.9 3.4 - 5.0 g/dL   Magnesium   Result Value Ref Range    Magnesium 2.23 1.60 - 2.40 mg/dL     *Note: Due to a large number of results and/or encounters for the requested time period, some results have not been displayed. A complete set of results can be found in Results Review.     XR chest 1 view    Result Date: 11/9/2024  Interpreted By:  Finkelstein, Evan, and Ritchie Brandon STUDY: XR CHEST 1 VIEW;  11/8/2024 11:55 pm   INDICATION: Signs/Symptoms:acutely worsening SOB, assess for interval development of pulmonary edema.   COMPARISON:   Chest x-ray 11/08/2024, 8:40 p.m./8:22 p.m.   ACCESSION NUMBER(S): [id number]   ORDERING CLINICIAN: JACQUES VILLA   FINDINGS: AP radiograph of the chest was provided.   CARDIOMEDIASTINAL SILHOUETTE: Cardiomediastinal silhouette is stable in size and configuration.   LUNGS: Similar prominent interstitial markings and bibasilar opacities. Small left-sided pleural effusion. No pneumothorax.   ABDOMEN: No remarkable upper abdominal findings.   BONES: No acute osseous changes.       1.  Prominent interstitial markings and bibasilar airspace opacities with similar aeration compared to prior imaging. 2. Small left-sided pleural effusion.   I personally reviewed the images/study and I agree with the findings as stated by resident Addy Coyne. This study was interpreted at [place], [place].   MACRO: None   Signed by: Jonas  Finkelstein 11/9/2024 12:22 AM Dictation workstation:   [id number]    ECG 12 lead    Result Date: 11/8/2024  Normal sinus rhythm Left ventricular hypertrophy with repolarization abnormality ( R in aVL , [employer] product ) Abnormal ECG When compared with ECG of 08-NOV-2024 23:48, No significant change was found See ED provider note for full interpretation and clinical correlation Confirmed by Bibi Toussaint ([id number]) on 11/8/2024 11:52:44 PM    ECG 12 lead    Result Date: 11/8/2024  Normal sinus rhythm Left ventricular hypertrophy with repolarization abnormality ( R in aVL , [place] product ) Abnormal ECG When compared with ECG of 08-NOV-2024 23:48, No significant change was found See ED provider note for full interpretation and clinical correlation Confirmed by Bibi Toussaint ([id number]) on 11/8/2024 11:52:39 PM    XR chest 1 view    Result Date: 11/8/2024  Interpreted By:  Finkelstein, Jonas,  and Rossy Marti STUDY: XR CHEST 1 VIEW;  11/8/2024 8:44 pm   INDICATION: Signs/Symptoms:SOB.   COMPARISON: Same day chest radiograph time stamped 8:22 p.m.   ACCESSION NUMBER(S): [id number]   ORDERING CLINICIAN: FLORENTIN BRANNON   FINDINGS: Lateral radiograph of the chest was provided.   CARDIOMEDIASTINAL SILHOUETTE: The cardiomediastinal silhouette is normal in size and configuration.   LUNGS: Small left pleural effusion. Interstitial opacities as previously described.   ABDOMEN: No remarkable upper abdominal findings.   BONES: No acute osseous abnormality.       1. Small left pleural effusion and interstitial markings as previously described. No additional abnormality on limited single lateral view.     I personally reviewed the image(s)/study and resident interpretation as stated by Dr. Marti Blair MD. I agree with the findings as stated. This study was interpreted at [facility], [place], [place].   MACRO: None   Signed by: Evan Finkelstein 11/8/2024 9:06 PM Dictation workstation:    [id number]    XR chest 1 view    Result Date: 11/8/2024  Interpreted By:  Finkelstein, Jonas,  and Rossy Marti STUDY: XR CHEST 1 VIEW;  11/8/2024 8:27 pm   INDICATION: Signs/Symptoms:hypoxia.   COMPARISON: Chest radiograph 11/01/2024   ACCESSION NUMBER(S): [id number]   ORDERING CLINICIAN: JACQUES VILLA   FINDINGS: AP radiograph of the chest was provided.   Stenting material in the right subclavian region.   CARDIOMEDIASTINAL SILHOUETTE: Cardiomediastinal silhouette is stable in size and configuration. Mild calcifications of the aortic arch.   LUNGS: Prominent interstitial markings with bibasilar opacities. Mild blunting of the left costophrenic angle. No pneumothorax.   ABDOMEN: No remarkable upper abdominal findings.   BONES: No acute osseous abnormality.       1. Prominent interstitial markings and bibasilar opacities which may represent mixed interstitial/alveolar edema with bibasilar atelectasis or pneumonia not excluded. 2. Small left pleural effusion.     I personally reviewed the image(s)/study and resident interpretation as stated by Dr. Marti Blair MD. I agree with the findings as stated. This study was interpreted at [facility], [place], [place].   MACRO: None   Signed by: Evan Finkelstein 11/8/2024 8:39 PM Dictation workstation:   [id number]          Assessment/Plan   54 y.o. female with PMH significant for ESRD 2/2 on dialysis T/Th/Sat via RUE AVF (last complete session 11/07/2024), HTN, HFpEF, COPD, brachial DVT on Eliquis who presented for [facility] ED with a chief complaint of SOB, found to have SBP >200 and briefly required BiPAP in the ED. She is admitted to the CICU for the management of hypertensive emergency with flash pulmonary edema. Suspect 2/2 medication noncompliance.  Was treated with Nicardipine gtt and resume some home BP meds. SBP dropped to 130s so stopped Nicardipine. Elevated again to 180s-190s, resumed nifedipine PO 11/9 and underwent  HD yesterday.    Updates 11/10/24  - Resumed Nifedipine 90 PO  - Still holding valsartan, clonidine, and hydral from home meds list   - Will transfer to floor      NEUROLOGIC  #Peripheral neuropathy   - c/w home gabapentin      CARDIOVASCULAR  #HTN emergency with flash pulmonary edema   #HFpEF  #tropenemia, 2/2 TII MI in setting of marked HTN and ESRD   :: home meds: Coreg 50 mg BID, Clonidine 0.2 mg/24 hr patch- 1 patch on skin every 7 days, Hydralazine 100 mg TID , Imdur 120 mg every day , Nifedipine 90 mg every day , Valsartan 320 mg every day   :: TTE 04/2024- LVEF 60-65% with pseudonormal pattern of left ventricular diastolic filling.   :: Renal US 2023 --> Doppler evaluation compatible with history of chronic renal failure on dialysis.   :: Seen by sleep medicine 11/2024 who felt she likely had sleep apnea based on h/p- planning for outpatient sleep study   :: given imdur and hydral in ED   Plan:  - Continue with Imdur every day, and coreg 50 BID  - Resumed nifedipine 90 this am   - Can resume other BP meds as above as needed   - Weaned off Nicardipine gtt  - Getting HD today   - As per her HFpEF not currently on MRA given renal function, could consider SGLT2i- has not been initiated likely 2/2 ESRD on iHD   - Consider optimizing home BP reg, would favor initiation of Entresto   - serum drug screen sent, follow up      #HLD   - c/w home statin, aspirin      PULMONARY  #AHRF  :: s/p IV lasix 40 mg in the ED   :: Home med Torsemide 40 mg every day on non HD days  :: Bedside POCUS with IVC collapsible    :: Impression: likely 2/2 flash pulm edema, likely 2/2 fluid redistribution as opposed to fluid overload. Clinical picture not endorsing infection (no fevers/leukocytosis)    Plan:  - Control BP as above  - Nephro consulted for HD today   - Wean O2 as able      #COPD without acute exacerbation   - continue home Breo Ellipta 100-25 1 puff daily   - Duoneb q4H scheduled, albuterol q2H PRN      RENAL/  #ESRD on  iHD T/TH/SAT  - c/w home Nephrocaps , Phoslo   - holding home Torsemide 40 mg every day on non HD days   - Nephro c/s in AM for iHD      GASTROINTESTINAL  #GERD  - c/w home PPI      ENDOCRINE  No active issues      HEME/ONC  #Normocytic anemia, likely 2/2 renal disease  - c/w  Epoetin joceline 76534 units three times a week      #Hx of LUE DVT  :: per chart review appears that brachial vein DVT in setting of PICC --> provoked   Plan:  - c/w eliquis for now, suspect can be discontinued as > 6 month duration of therapy has now been completed. Will defer to floor team for final decision      INFECTIOUS DISEASE  No active issues      MSK/DERM  No active issues      F: PRN for euvolemia, caution ESRD on iHD   E: PRN K>4, Mg>2, P>3   N: Renal diet  A: PIV, RUE AVF   Oxygen: 2L NC   Abx: Ceftriaxone/Azithro (11/08)  Gtts: None    DVT ppx: Eliquis for now   GI ppx: home PPI   Code Status: full code (confirmed on admission)  Surrogate Medical Decision-maker:    Hai Pinedo ([facility]) [phone]        Scott Monteiro MD

## 2024-11-11 LAB
ALBUMIN SERPL BCP-MCNC: 3.6 G/DL (ref 3.4–5)
ANION GAP SERPL CALC-SCNC: 18 MMOL/L (ref 10–20)
BASOPHILS # BLD AUTO: 0.06 X10*3/UL (ref 0–0.1)
BASOPHILS NFR BLD AUTO: 0.9 %
BUN SERPL-MCNC: 47 MG/DL (ref 6–23)
CALCIUM SERPL-MCNC: 9.7 MG/DL (ref 8.6–10.6)
CHLORIDE SERPL-SCNC: 99 MMOL/L (ref 98–107)
CO2 SERPL-SCNC: 27 MMOL/L (ref 21–32)
CREAT SERPL-MCNC: 8.23 MG/DL (ref 0.5–1.05)
EGFRCR SERPLBLD CKD-EPI 2021: 5 ML/MIN/1.73M*2
EOSINOPHIL # BLD AUTO: 0.87 X10*3/UL (ref 0–0.7)
EOSINOPHIL NFR BLD AUTO: 13.6 %
ERYTHROCYTE [DISTWIDTH] IN BLOOD BY AUTOMATED COUNT: 18.5 % (ref 11.5–14.5)
GLUCOSE SERPL-MCNC: 79 MG/DL (ref 74–99)
HCT VFR BLD AUTO: 25.3 % (ref 36–46)
HGB BLD-MCNC: 8.1 G/DL (ref 12–16)
IMM GRANULOCYTES # BLD AUTO: 0.1 X10*3/UL (ref 0–0.7)
IMM GRANULOCYTES NFR BLD AUTO: 1.6 % (ref 0–0.9)
LYMPHOCYTES # BLD AUTO: 1.02 X10*3/UL (ref 1.2–4.8)
LYMPHOCYTES NFR BLD AUTO: 16 %
MAGNESIUM SERPL-MCNC: 2.56 MG/DL (ref 1.6–2.4)
MCH RBC QN AUTO: 29.6 PG (ref 26–34)
MCHC RBC AUTO-ENTMCNC: 32 G/DL (ref 32–36)
MCV RBC AUTO: 92 FL (ref 80–100)
MONOCYTES # BLD AUTO: 0.56 X10*3/UL (ref 0.1–1)
MONOCYTES NFR BLD AUTO: 8.8 %
NEUTROPHILS # BLD AUTO: 3.78 X10*3/UL (ref 1.2–7.7)
NEUTROPHILS NFR BLD AUTO: 59.1 %
NRBC BLD-RTO: 0 /100 WBCS (ref 0–0)
PHOSPHATE SERPL-MCNC: 6.4 MG/DL (ref 2.5–4.9)
PLATELET # BLD AUTO: 182 X10*3/UL (ref 150–450)
POTASSIUM SERPL-SCNC: 4.9 MMOL/L (ref 3.5–5.3)
RBC # BLD AUTO: 2.74 X10*6/UL (ref 4–5.2)
SODIUM SERPL-SCNC: 139 MMOL/L (ref 136–145)
WBC # BLD AUTO: 6.4 X10*3/UL (ref 4.4–11.3)

## 2024-11-11 PROCEDURE — 1100000001 HC PRIVATE ROOM DAILY

## 2024-11-11 PROCEDURE — 36415 COLL VENOUS BLD VENIPUNCTURE: CPT

## 2024-11-11 PROCEDURE — 85025 COMPLETE CBC W/AUTO DIFF WBC: CPT

## 2024-11-11 PROCEDURE — 84100 ASSAY OF PHOSPHORUS: CPT

## 2024-11-11 PROCEDURE — 83735 ASSAY OF MAGNESIUM: CPT

## 2024-11-11 PROCEDURE — 6350000001 HC RX 635 EPOETIN >10,000 UNITS: Mod: JZ

## 2024-11-11 PROCEDURE — 2500000002 HC RX 250 W HCPCS SELF ADMINISTERED DRUGS (ALT 637 FOR MEDICARE OP, ALT 636 FOR OP/ED)

## 2024-11-11 PROCEDURE — 2500000001 HC RX 250 WO HCPCS SELF ADMINISTERED DRUGS (ALT 637 FOR MEDICARE OP)

## 2024-11-11 PROCEDURE — 2500000001 HC RX 250 WO HCPCS SELF ADMINISTERED DRUGS (ALT 637 FOR MEDICARE OP): Performed by: STUDENT IN AN ORGANIZED HEALTH CARE EDUCATION/TRAINING PROGRAM

## 2024-11-11 PROCEDURE — 99233 SBSQ HOSP IP/OBS HIGH 50: CPT | Performed by: INTERNAL MEDICINE

## 2024-11-11 PROCEDURE — 94640 AIRWAY INHALATION TREATMENT: CPT

## 2024-11-11 RX ORDER — TORSEMIDE 20 MG/1
40 TABLET ORAL
Status: DISCONTINUED | OUTPATIENT
Start: 2024-11-11 | End: 2024-11-20 | Stop reason: HOSPADM

## 2024-11-11 RX ORDER — TORSEMIDE 20 MG/1
40 TABLET ORAL
Status: DISCONTINUED | OUTPATIENT
Start: 2024-11-13 | End: 2024-11-11

## 2024-11-11 SDOH — SOCIAL STABILITY: SOCIAL INSECURITY: ARE YOU MARRIED, WIDOWED, DIVORCED, SEPARATED, NEVER MARRIED, OR LIVING WITH A PARTNER?: LIVING WITH PARTNER

## 2024-11-11 SDOH — ECONOMIC STABILITY: FOOD INSECURITY: WITHIN THE PAST 12 MONTHS, YOU WORRIED THAT YOUR FOOD WOULD RUN OUT BEFORE YOU GOT THE MONEY TO BUY MORE.: NEVER TRUE

## 2024-11-11 SDOH — ECONOMIC STABILITY: INCOME INSECURITY: IN THE PAST 12 MONTHS HAS THE ELECTRIC, GAS, OIL, OR WATER COMPANY THREATENED TO SHUT OFF SERVICES IN YOUR HOME?: NO

## 2024-11-11 SDOH — ECONOMIC STABILITY: HOUSING INSECURITY: AT ANY TIME IN THE PAST 12 MONTHS, WERE YOU HOMELESS OR LIVING IN A SHELTER (INCLUDING NOW)?: NO

## 2024-11-11 SDOH — SOCIAL STABILITY: SOCIAL INSECURITY: WITHIN THE LAST YEAR, HAVE YOU BEEN AFRAID OF YOUR PARTNER OR EX-PARTNER?: NO

## 2024-11-11 SDOH — SOCIAL STABILITY: SOCIAL INSECURITY: WITHIN THE LAST YEAR, HAVE YOU BEEN HUMILIATED OR EMOTIONALLY ABUSED IN OTHER WAYS BY YOUR PARTNER OR EX-PARTNER?: NO

## 2024-11-11 SDOH — ECONOMIC STABILITY: HOUSING INSECURITY: IN THE PAST 12 MONTHS, HOW MANY TIMES HAVE YOU MOVED WHERE YOU WERE LIVING?: 0

## 2024-11-11 SDOH — ECONOMIC STABILITY: HOUSING INSECURITY: IN THE LAST 12 MONTHS, WAS THERE A TIME WHEN YOU WERE NOT ABLE TO PAY THE MORTGAGE OR RENT ON TIME?: NO

## 2024-11-11 SDOH — ECONOMIC STABILITY: FOOD INSECURITY: WITHIN THE PAST 12 MONTHS, THE FOOD YOU BOUGHT JUST DIDN'T LAST AND YOU DIDN'T HAVE MONEY TO GET MORE.: NEVER TRUE

## 2024-11-11 SDOH — ECONOMIC STABILITY: FOOD INSECURITY: HOW HARD IS IT FOR YOU TO PAY FOR THE VERY BASICS LIKE FOOD, HOUSING, MEDICAL CARE, AND HEATING?: NOT VERY HARD

## 2024-11-11 SDOH — ECONOMIC STABILITY: TRANSPORTATION INSECURITY: IN THE PAST 12 MONTHS, HAS LACK OF TRANSPORTATION KEPT YOU FROM MEDICAL APPOINTMENTS OR FROM GETTING MEDICATIONS?: NO

## 2024-11-11 ASSESSMENT — PAIN SCALES - GENERAL
PAINLEVEL_OUTOF10: 0 - NO PAIN

## 2024-11-11 ASSESSMENT — ACTIVITIES OF DAILY LIVING (ADL): LACK_OF_TRANSPORTATION: NO

## 2024-11-11 ASSESSMENT — PAIN - FUNCTIONAL ASSESSMENT
PAIN_FUNCTIONAL_ASSESSMENT: 0-10

## 2024-11-11 NOTE — CARE PLAN
Problem: Safety - Medical Restraint  Goal: Remains free of injury from restraints (Restraint for Interference with Medical Device)  Outcome: Met  Goal: Free from restraint(s) (Restraint for Interference with Medical Device)  Outcome: Met     Problem: Fall/Injury  Goal: Not fall by end of shift  Outcome: Progressing  Goal: Be free from injury by end of the shift  Outcome: Progressing  Goal: Verbalize understanding of personal risk factors for fall in the hospital  Outcome: Progressing  Goal: Verbalize understanding of risk factor reduction measures to prevent injury from fall in the home  Outcome: Progressing  Goal: Use assistive devices by end of the shift  Outcome: Progressing  Goal: Pace activities to prevent fatigue by end of the shift  Outcome: Progressing     Problem: Pain - Adult  Goal: Verbalizes/displays adequate comfort level or baseline comfort level  Outcome: Progressing     Problem: Safety - Adult  Goal: Free from fall injury  Outcome: Progressing     Problem: Discharge Planning  Goal: Discharge to home or other facility with appropriate resources  Outcome: Progressing   The patient's goals for the shift include      The clinical goals for the shift include pt will remain hemodynamically stable throughout shift

## 2024-11-11 NOTE — CONSULTS
Nutrition Note:     Met with pt this morning at bedside per RD discretion. Noted pt being evaluated for kidney transplant. She was admitted to the CICU for the management of hypertensive emergency with flash pulmonary edema. MD suspect 2/2 medication noncompliance. She is on a TTS HD schedule at home.     Wt Readings from Last 10 Encounters:   11/09/24 60.5 kg (133 lb 6.1 oz)   11/08/24 55.4 kg (122 lb 1.6 oz)   11/06/24 47.2 kg (104 lb)   11/01/24 53.1 kg (117 lb)   10/24/24 57.6 kg (126 lb 15.8 oz)   10/07/24 52.6 kg (116 lb)   10/01/24 52.6 kg (116 lb)   09/29/24 52.6 kg (116 lb)   09/24/24 52.6 kg (116 lb)   09/20/24 52.2 kg (115 lb 1.3 oz)     Per chart review, pt's dry weight is around 51.5kg.     Pt says she eats well at home. Appears well-nourished. She has a hard time eating when she is on the hospital and on a renal diet because she cannot order anything that she likes. She was eating eggs and pancakes on RD arrival. It was documented that she consumed 100% of her breakfast yesterday. Pt requested discontinuation of K restriction, but is agreeable to keeping Na restriction.     Explained reasoning for restrictions. Pt expressed understanding. RD agreeable in taking off K restriction if pt pays attention to what she eats.     She says she follows with dietitian outpatient (dialysis center). Her dialysis dietitian had given her 2 renal diet cookbooks which she has been enjoying and making the recipes for her whole family.     Pt was last seen by [facility] RD on 10/4/24 and received diet education at that time.     Pt denied having any questions or concerns at this time. Provided pt with multiple handouts regarding K, Phos, Fluid, and Sodium restriction. Pt thankful for information provided.     Encouraged her to reach out to RD if any questions or concerns arise.

## 2024-11-11 NOTE — PROGRESS NOTES
Stacey Heath  Age: 54 y.o.  MRN: [id number]  Date: 11/6/2024  Location of service: phone call    Program Details  Medicaid Community Clinical Case Management  Status: Enrolled  Effective Dates: 10/17/2023 - present  Responsible Staff: NAREN Davidson      Goals Reviewed:      Summary:  This writer calls patient to confirm our appointment for today. Patient states she is at a doctor's appointment and does not believe she will be home in time for our appointment. We reschedule her appointment at this time.    Appointment start time: 1209  Appointment completion time: 1212  Total time spent with patient (in minutes): 3  Non-Billable Time: 3  Billable Time Total: 0    Roberta Gallego RN

## 2024-11-11 NOTE — PROGRESS NOTES
11/11/24 1218   Discharge Planning   Living Arrangements Spouse/significant other;Children   Support Systems Spouse/significant other;Children   Assistance Needed n/a   Type of Residence Private residence   Do you have animals or pets at home? No   Who is requesting discharge planning? Provider   Home or Post Acute Services   (TBD)   Does the patient need discharge transport arranged? No   Financial Resource Strain   How hard is it for you to pay for the very basics like food, housing, medical care, and heating? Not very   Housing Stability   In the last 12 months, was there a time when you were not able to pay the mortgage or rent on time? N   In the past 12 months, how many times have you moved where you were living? 0   At any time in the past 12 months, were you homeless or living in a shelter (including now)? N   Transportation Needs   In the past 12 months, has lack of transportation kept you from medical appointments or from getting medications? no   In the past 12 months, has lack of transportation kept you from meetings, work, or from getting things needed for daily living? No     - ICU TREATMENT PLAN: Patient was admitted to the CICU for the management of hypertensive emergency with flash pulmonary edema.  - Payer: [employer], [employer].  -Support System: [place], children.   - Planned Disposition: Pending medical outcome and rehab recommendations.  - Additional Information: SDOH and Social Work Discharge Planning assessments were completed with the patient. There were no SDOH issues identified.   - Barriers to discharge: None at this time. SW will continue to follow.

## 2024-11-11 NOTE — PROGRESS NOTES
Stacey Heath  Age: 54 y.o.  MRN: [id number]  Date: 11/7/2024  Location of service: care coordination    Program Details  Medicaid Community Clinical Case Management  Status: Enrolled  Effective Dates: 10/17/2023 - present  Responsible Staff: NAREN Davidson      Goals Reviewed:  Problem: Risk of Uncoordinated Care       Goal: Care will be Coordinated and Supported by a Multidisciplinary Team of Providers       Priority: High          Summary:  This writer messages Dr. Barraza regarding Stacey's continued HTN and fluid overload. Dr. Barraza would like us to confirm that dialysis is getting Stacey down to her dry weight. This writer will follow-up.    Appointment start time: 0933  Appointment completion time: 0938  Total time spent with patient (in minutes): 5  Non-Billable Time: 5  Billable Time Total: 0    Roberta Gallego RN

## 2024-11-11 NOTE — CARE PLAN
Problem: Safety - Medical Restraint  Goal: Remains free of injury from restraints (Restraint for Interference with Medical Device)  Outcome: Progressing  Goal: Free from restraint(s) (Restraint for Interference with Medical Device)  Outcome: Progressing     Problem: Fall/Injury  Goal: Not fall by end of shift  Outcome: Progressing  Goal: Be free from injury by end of the shift  Outcome: Progressing  Goal: Verbalize understanding of personal risk factors for fall in the hospital  Outcome: Progressing  Goal: Verbalize understanding of risk factor reduction measures to prevent injury from fall in the home  Outcome: Progressing  Goal: Use assistive devices by end of the shift  Outcome: Progressing  Goal: Pace activities to prevent fatigue by end of the shift  Outcome: Progressing     Problem: Pain - Adult  Goal: Verbalizes/displays adequate comfort level or baseline comfort level  Outcome: Progressing     Problem: Safety - Adult  Goal: Free from fall injury  Outcome: Progressing     Problem: Discharge Planning  Goal: Discharge to home or other facility with appropriate resources  Outcome: Progressing     Problem: Chronic Conditions and Co-morbidities  Goal: Patient's chronic conditions and co-morbidity symptoms are monitored and maintained or improved  Outcome: Progressing       The clinical goals for the shift include remaining hemodynamically stable

## 2024-11-12 ENCOUNTER — APPOINTMENT (OUTPATIENT)
Dept: DIALYSIS | Facility: HOSPITAL | Age: 54
End: 2024-11-12
Payer: COMMERCIAL

## 2024-11-12 LAB
ALBUMIN SERPL BCP-MCNC: 3.8 G/DL (ref 3.4–5)
ANION GAP SERPL CALC-SCNC: 21 MMOL/L (ref 10–20)
BASOPHILS # BLD AUTO: 0.08 X10*3/UL (ref 0–0.1)
BASOPHILS NFR BLD AUTO: 1.1 %
BUN SERPL-MCNC: 62 MG/DL (ref 6–23)
CALCIUM SERPL-MCNC: 9.9 MG/DL (ref 8.6–10.6)
CHLORIDE SERPL-SCNC: 99 MMOL/L (ref 98–107)
CO2 SERPL-SCNC: 25 MMOL/L (ref 21–32)
CREAT SERPL-MCNC: 11.16 MG/DL (ref 0.5–1.05)
EGFRCR SERPLBLD CKD-EPI 2021: 4 ML/MIN/1.73M*2
EOSINOPHIL # BLD AUTO: 0.83 X10*3/UL (ref 0–0.7)
EOSINOPHIL NFR BLD AUTO: 11.4 %
ERYTHROCYTE [DISTWIDTH] IN BLOOD BY AUTOMATED COUNT: 18.5 % (ref 11.5–14.5)
GLUCOSE SERPL-MCNC: 77 MG/DL (ref 74–99)
HCT VFR BLD AUTO: 28.9 % (ref 36–46)
HGB BLD-MCNC: 8.8 G/DL (ref 12–16)
IMM GRANULOCYTES # BLD AUTO: 0.1 X10*3/UL (ref 0–0.7)
IMM GRANULOCYTES NFR BLD AUTO: 1.4 % (ref 0–0.9)
LYMPHOCYTES # BLD AUTO: 1 X10*3/UL (ref 1.2–4.8)
LYMPHOCYTES NFR BLD AUTO: 13.7 %
MAGNESIUM SERPL-MCNC: 2.76 MG/DL (ref 1.6–2.4)
MCH RBC QN AUTO: 29.6 PG (ref 26–34)
MCHC RBC AUTO-ENTMCNC: 30.4 G/DL (ref 32–36)
MCV RBC AUTO: 97 FL (ref 80–100)
MONOCYTES # BLD AUTO: 0.58 X10*3/UL (ref 0.1–1)
MONOCYTES NFR BLD AUTO: 7.9 %
NEUTROPHILS # BLD AUTO: 4.71 X10*3/UL (ref 1.2–7.7)
NEUTROPHILS NFR BLD AUTO: 64.5 %
NRBC BLD-RTO: 0.3 /100 WBCS (ref 0–0)
PHOSPHATE SERPL-MCNC: 6.4 MG/DL (ref 2.5–4.9)
PLATELET # BLD AUTO: 212 X10*3/UL (ref 150–450)
POTASSIUM SERPL-SCNC: 5.6 MMOL/L (ref 3.5–5.3)
RBC # BLD AUTO: 2.97 X10*6/UL (ref 4–5.2)
SODIUM SERPL-SCNC: 139 MMOL/L (ref 136–145)
WBC # BLD AUTO: 7.3 X10*3/UL (ref 4.4–11.3)

## 2024-11-12 PROCEDURE — 1100000001 HC PRIVATE ROOM DAILY

## 2024-11-12 PROCEDURE — 36415 COLL VENOUS BLD VENIPUNCTURE: CPT

## 2024-11-12 PROCEDURE — 85025 COMPLETE CBC W/AUTO DIFF WBC: CPT

## 2024-11-12 PROCEDURE — 83735 ASSAY OF MAGNESIUM: CPT

## 2024-11-12 PROCEDURE — 94640 AIRWAY INHALATION TREATMENT: CPT

## 2024-11-12 PROCEDURE — 2500000002 HC RX 250 W HCPCS SELF ADMINISTERED DRUGS (ALT 637 FOR MEDICARE OP, ALT 636 FOR OP/ED): Performed by: STUDENT IN AN ORGANIZED HEALTH CARE EDUCATION/TRAINING PROGRAM

## 2024-11-12 PROCEDURE — 99232 SBSQ HOSP IP/OBS MODERATE 35: CPT | Performed by: STUDENT IN AN ORGANIZED HEALTH CARE EDUCATION/TRAINING PROGRAM

## 2024-11-12 PROCEDURE — 2500000001 HC RX 250 WO HCPCS SELF ADMINISTERED DRUGS (ALT 637 FOR MEDICARE OP): Performed by: STUDENT IN AN ORGANIZED HEALTH CARE EDUCATION/TRAINING PROGRAM

## 2024-11-12 PROCEDURE — 80069 RENAL FUNCTION PANEL: CPT

## 2024-11-12 PROCEDURE — 8010000001 HC DIALYSIS - HEMODIALYSIS PER DAY

## 2024-11-12 PROCEDURE — 97161 PT EVAL LOW COMPLEX 20 MIN: CPT | Mod: GP

## 2024-11-12 PROCEDURE — 90935 HEMODIALYSIS ONE EVALUATION: CPT | Performed by: INTERNAL MEDICINE

## 2024-11-12 RX ORDER — TRAMADOL HYDROCHLORIDE 50 MG/1
50 TABLET ORAL EVERY 12 HOURS PRN
Status: DISCONTINUED | OUTPATIENT
Start: 2024-11-12 | End: 2024-11-20 | Stop reason: HOSPADM

## 2024-11-12 ASSESSMENT — COGNITIVE AND FUNCTIONAL STATUS - GENERAL
DRESSING REGULAR UPPER BODY CLOTHING: A LITTLE
EATING MEALS: A LITTLE
TOILETING: A LITTLE
HELP NEEDED FOR BATHING: A LITTLE
CLIMB 3 TO 5 STEPS WITH RAILING: A LOT
MOVING FROM LYING ON BACK TO SITTING ON SIDE OF FLAT BED WITH BEDRAILS: A LITTLE
DRESSING REGULAR LOWER BODY CLOTHING: A LITTLE
PERSONAL GROOMING: A LITTLE
STANDING UP FROM CHAIR USING ARMS: A LITTLE
WALKING IN HOSPITAL ROOM: A LITTLE
TURNING FROM BACK TO SIDE WHILE IN FLAT BAD: A LITTLE
DAILY ACTIVITIY SCORE: 18
MOVING TO AND FROM BED TO CHAIR: A LITTLE
MOBILITY SCORE: 17

## 2024-11-12 ASSESSMENT — PAIN SCALES - GENERAL
PAINLEVEL_OUTOF10: 8
PAINLEVEL_OUTOF10: 0 - NO PAIN
PAINLEVEL_OUTOF10: 3
PAINLEVEL_OUTOF10: 0 - NO PAIN

## 2024-11-12 ASSESSMENT — PAIN DESCRIPTION - LOCATION: LOCATION: ABDOMEN

## 2024-11-12 ASSESSMENT — PAIN SCALES - PAIN ASSESSMENT IN ADVANCED DEMENTIA (PAINAD): TOTALSCORE: MEDICATION (SEE MAR)

## 2024-11-12 ASSESSMENT — ACTIVITIES OF DAILY LIVING (ADL): ADL_ASSISTANCE: INDEPENDENT

## 2024-11-12 ASSESSMENT — PAIN - FUNCTIONAL ASSESSMENT
PAIN_FUNCTIONAL_ASSESSMENT: NO/DENIES PAIN
PAIN_FUNCTIONAL_ASSESSMENT: 0-10

## 2024-11-12 NOTE — PROGRESS NOTES
Transitional Care Coordinator Progress Note:   Pt denies having any previous home care services.  Pt has a nebulizer at home and states she may need oxygen at discharge.  Pt denies using any assistive devices.  Pt has dialysis on TTS at the [facility], chair time 1200 pm.  Pt's nephew provides transportation to dialysis and will also provide transport for discharge.      Otilia HILL, RN-BC  Transitional Care Coordinator (TCC)  [phone]

## 2024-11-12 NOTE — PROGRESS NOTES
Physical Therapy                 Therapy Communication Note    Patient Name: Stacey Heath  MRN: [id number]  Department: [facility]  Room: 2026/2026-A  Today's Date: 11/12/2024     Discipline: Physical Therapy    Missed Visit Reason: Missed Visit Reason: Other (Comment) (0900. Pt off division at HD. Will reattempt as schedule permits.)    Missed Time: Attempt    Comment:

## 2024-11-12 NOTE — NURSING NOTE
.Report from Sending RN:    Report From: PONCHO Hayes  Recent Surgery of Procedure: No  Baseline Level of Consciousness (LOC): A&O X3  Oxygen Use: YES  Type: 3L NC  Diabetic: No  Last BP Med Given Day of Dialysis: hydralazine. nifedipine  Last Pain Med Given:   Lab Tests to be Obtained with Dialysis: No  Blood Transfusion to be Given During Dialysis: No  Available IV Access: Yes  Medications to be Administered During Dialysis: No  Continuous IV Infusion Running: No  Restraints on Currently or in the Last 24 Hours: No  Hand-Off Communication: Pt had no acute event overnight, vital signs stable. Not on precaution, BP medication given.  Dialysis Catheter Dressing: AVF  Last Dressing Change: N/A

## 2024-11-12 NOTE — CARE PLAN
The patient's goals for the shift include      The clinical goals for the shift include Pt will be free from falls and/or injury by end of the shift    Over the shift, the patient did not make progress toward the following goals. Barriers to progression include   Problem: Fall/Injury  Goal: Not fall by end of shift  Outcome: Progressing  Goal: Be free from injury by end of the shift  Outcome: Progressing  Goal: Verbalize understanding of personal risk factors for fall in the hospital  Outcome: Progressing  Goal: Verbalize understanding of risk factor reduction measures to prevent injury from fall in the home  Outcome: Progressing  Goal: Use assistive devices by end of the shift  Outcome: Progressing  Goal: Pace activities to prevent fatigue by end of the shift  Outcome: Progressing     Problem: Pain - Adult  Goal: Verbalizes/displays adequate comfort level or baseline comfort level  Outcome: Progressing     Problem: Safety - Adult  Goal: Free from fall injury  Outcome: Progressing     Problem: Discharge Planning  Goal: Discharge to home or other facility with appropriate resources  Outcome: Progressing     Problem: Chronic Conditions and Co-morbidities  Goal: Patient's chronic conditions and co-morbidity symptoms are monitored and maintained or improved  Outcome: Progressing     Problem: Heart Failure  Goal: Improved gas exchange this shift  Outcome: Progressing  Goal: Improved urinary output this shift  Outcome: Progressing  Goal: Reduction in peripheral edema within 24 hours  Outcome: Progressing  Goal: Report improvement of dyspnea/breathlessness this shift  Outcome: Progressing  Goal: Weight from fluid excess reduced over 2-3 days, then stabilize  Outcome: Progressing  Goal: Increase self care and/or family involvement in 24 hours  Outcome: Progressing     Problem: Diabetes  Goal: Achieve decreasing blood glucose levels by end of shift  Outcome: Progressing  Goal: Increase stability of blood glucose readings by  end of shift  Outcome: Progressing  Goal: Decrease in ketones present in urine by end of shift  Outcome: Progressing  Goal: Maintain electrolyte levels within acceptable range throughout shift  Outcome: Progressing  Goal: Maintain glucose levels >70mg/dl to <250mg/dl throughout shift  Outcome: Progressing  Goal: No changes in neurological exam by end of shift  Outcome: Progressing  Goal: Learn about and adhere to nutrition recommendations by end of shift  Outcome: Progressing  Goal: Vital signs within normal range for age by end of shift  Outcome: Progressing  Goal: Increase self care and/or family involovement by end of shift  Outcome: Progressing  Goal: Receive DSME education by end of shift  Outcome: Progressing

## 2024-11-12 NOTE — PROGRESS NOTES
INTERNAL MEDICINE PROGRESS NOTE     BRIEF NARRATIVE      Stacey Heath is a 54 y.o. female on day 3 of admission presenting with Congestive heart failure, unspecified HF chronicity, unspecified heart failure type.    CICU course:   Pt with PMH significant for ESRD 2/2 on dialysis T/Th/Sat via RUE AVF (last complete session 11/07/2024), HTN, HFpEF, COPD, brachial DVT on Eliquis who presented for [facility] ED with a chief complaint of SOB, found to have SBP >200 and briefly required BiPAP in the ED. She was admitted to the CICU for the management of hypertensive emergency with flash pulmonary edema. Suspect 2/2 resistant hypertension vs medication noncompliance. Was treated with Nicardipine gtt and resumed some home BP meds. SBP dropped to 130s so Nicardipine was held. Elevated again to 180s-190s, resumed nifedipine 11/9 . She underwent HD 11/9     Medicine course:   The patient was transferred to medicine floor on 11/11.  BP remained stable. Discussed with nephrology regarding the etiology of resistant hypertension, Dr. Allison who recommended a few more day of hospitalization for daily dialysis.      ASSESSMENT / PLANS      > HTN emergency with flash pulmonary edema   > HFpEF  > Tropenemia, 2/2 TII MI in setting of marked HTN and ESRD   :: home meds: Coreg 50 mg BID, Clonidine 0.2 mg/24 hr patch- 1 patch on skin every 7 days, Hydralazine 100 mg TID , Imdur 120 mg every day , Nifedipine 90 mg every day , Valsartan 320 mg every day   :: TTE 04/2024- LVEF 60-65% with pseudonormal pattern of left ventricular diastolic filling.   :: Renal US 2023 --> Doppler evaluation compatible with history of chronic renal failure on dialysis.   :: Seen by  sleep medicine 11/2024 who felt she likely had sleep apnea based on h/p- planning for outpatient sleep study   :: given imdur and hydral in ED   - Weaned off Nicardipine gtt  - Continue with Imdur every day, and coreg 50 BID  - Resumed nifedipine 90 on  11/10   - Can resume other BP meds as above as needed   - Getting HD daily until 11/15   - As per her HFpEF not currently on MRA given renal function, could consider SGLT2i- has not been initiated likely 2/2 ESRD on iHD   - Consider optimizing home BP reg, would favor initiation of Entresto   - serum drug screen negative   - Will obtain CTA renal artery to assess stenosis as possible etiology of resistant HTN      > AHRF - resolved   :: s/p IV lasix 40 mg in the ED   :: Home med Torsemide 40 mg every day on non HD days  :: Bedside POCUS with IVC collapsible    :: likely 2/2 flash pulm edema, likely 2/2 fluid redistribution as opposed to fluid overload. Clinical picture not endorsing infection (no fevers/leukocytosis)    - Control BP as above  - Nephro consulted for HD today   - Wean O2 as able     > ESRD on iHD T/TH/SAT  - c/w home Nephrocaps , Phoslo   - holding home Torsemide 40 mg every day on non HD days   - Nephro rec daily iHD and IUF for 4 days to aid with HTN and fluid overload     > HLD   - c/w home statin, aspirin      > Peripheral neuropathy   - c/w home gabapentin      > COPD without acute exacerbation   - continue home Breo Ellipta 100-25 1 puff daily   - Duoneb q4H scheduled, albuterol q2H PRN     > GERD  - c/w home PPI      > Normocytic anemia, likely 2/2 renal disease  - c/w  Epoetin joceline 79033 units three times a week      > Hx of LUE DVT  :: per chart review appears that brachial vein DVT in setting of PICC --> provoked   - c/w eliquis for now, suspect can be discontinued as > 6 month duration of therapy has now been completed. Will defer to floor team for final decision      DVT ppx: Eliquis for now   GI ppx: home PPI   Code Status: full code  (confirmed on admission)  Surrogate Medical Decision-maker:    Hai Pinedo ([facility]) [phone]      SUBJECTIVE       PT seen and examined today, BP better controlled     OBJECTIVE      Visit Vitals  /63   Pulse 75   Temp 36.5 °C (97.7 °F)   Resp 18        Intake/Output Summary (Last 24 hours) at 11/12/2024 0754  Last data filed at 11/11/2024 1700  Gross per 24 hour   Intake 210 ml   Output 100 ml   Net 110 ml       Physical Exam   Constitutional:       Appearance: Normal appearance.   HENT:      Head: Normocephalic and atraumatic.      Mouth/Throat:      Pharynx: Oropharynx is clear.   Cardiovascular:      Rate and Rhythm: Normal rate and regular rhythm.   Pulmonary:      Effort: Pulmonary effort is normal.      Breath sounds: Normal breath sounds.   Abdominal:      General: Abdomen is flat. Bowel sounds are normal.      Palpations: Abdomen is soft.   Musculoskeletal:         General: Normal range of motion.      Right lower leg: No edema.      Left lower leg: No edema.   Skin:     General: Skin is warm and dry.   Neurological:      General: No focal deficit present.      Mental Status: She is alert and oriented to person, place, and time.     Current Meds   apixaban, 5 mg, oral, BID  aspirin, 81 mg, oral, Daily  atorvastatin, 80 mg, oral, Nightly  calcium acetate, 1,334 mg, oral, TID  carvedilol, 50 mg, oral, BID  epoetin joceline, 10,000 Units, subcutaneous, Once per day on Monday Wednesday Friday  fluticasone, 2 spray, Each Nostril, Daily  fluticasone furoate-vilanteroL, 1 puff, inhalation, Daily  gabapentin, 300 mg, oral, Nightly  hydrALAZINE, 100 mg, oral, TID  ipratropium-albuteroL, 3 mL, nebulization, TID after meals  isosorbide mononitrate ER, 120 mg, oral, Daily  NIFEdipine ER, 90 mg, oral, Daily before breakfast  polyethylene glycol, 17 g, oral, Daily  torsemide, 40 mg, oral, Once per day on Sunday Monday Wednesday Friday  vitamin B complex-vitamin C-folic acid, 1 capsule, oral, Daily       PRN  medications: acetaminophen, albuterol, hydrOXYzine HCL, oxygen, oxygen, pantoprazole     LABS and IMAGING     WBC   Date Value Ref Range Status   11/11/2024 6.4 4.4 - 11.3 x10*3/uL Final   11/10/2024 4.8 4.4 - 11.3 x10*3/uL Final     Hemoglobin   Date Value Ref Range Status   11/11/2024 8.1 (L) 12.0 - 16.0 g/dL Final   11/10/2024 9.1 (L) 12.0 - 16.0 g/dL Final     Hematocrit   Date Value Ref Range Status   11/11/2024 25.3 (L) 36.0 - 46.0 % Final   11/10/2024 30.4 (L) 36.0 - 46.0 % Final     Bicarbonate   Date Value Ref Range Status   11/11/2024 27 21 - 32 mmol/L Final   11/10/2024 30 21 - 32 mmol/L Final     Creatinine   Date Value Ref Range Status   11/11/2024 8.23 (H) 0.50 - 1.05 mg/dL Final   11/10/2024 5.49 (H) 0.50 - 1.05 mg/dL Final     Calcium   Date Value Ref Range Status   11/11/2024 9.7 8.6 - 10.6 mg/dL Final   11/10/2024 9.6 8.6 - 10.6 mg/dL Final     INR   Date Value Ref Range Status   11/09/2024 1.3 (H) 0.9 - 1.1 Final       XR chest 1 view  Narrative: Interpreted By:  Finkelstein, Evan, and Ritchie Brandon   STUDY:  XR CHEST 1 VIEW;  11/8/2024 11:55 pm      INDICATION:  Signs/Symptoms:acutely worsening SOB, assess for interval development  of pulmonary edema.      COMPARISON:      Chest x-ray 11/08/2024, 8:40 p.m./8:22 p.m.      ACCESSION NUMBER(S):  [id number]      ORDERING CLINICIAN:  JACQUES VILLA      FINDINGS:  AP radiograph of the chest was provided.      CARDIOMEDIASTINAL SILHOUETTE:  Cardiomediastinal silhouette is stable in size and configuration.      LUNGS:  Similar prominent interstitial markings and bibasilar opacities.  Small left-sided pleural effusion. No pneumothorax.      ABDOMEN:  No remarkable upper abdominal findings.      BONES:  No acute osseous changes.      Impression: 1.  Prominent interstitial markings and bibasilar airspace opacities  with similar aeration compared to prior imaging.  2. Small left-sided pleural effusion.      I personally reviewed the images/study and I  agree with the findings  as stated by resident Addy Coyne. This study was interpreted  at [facility], [place], [place].      MACRO:  None      Signed by: Evan Finkelstein 11/9/2024 12:22 AM  Dictation workstation:   [id number]         PROBLEM LISTS      Problem List Items Addressed This Visit          Cardiac and Vasculature    * (Principal) Congestive heart failure, unspecified HF chronicity, unspecified heart failure type - Primary    Relevant Medications    carvedilol (Coreg) tablet 50 mg    isosorbide mononitrate ER (Imdur) 24 hr tablet 120 mg    NIFEdipine ER (Adalat CC) 24 hr tablet 90 mg     Other Visit Diagnoses       Pneumonia due to infectious organism, unspecified laterality, unspecified part of lung                This dictation was created with voice recognition software. Although every effort is made to review the dictation as it is transcribed, on occasion spoken words, phrases, names, numbers, punctuation etc can be misinterpreted by the the technology leading to omissions or inappropriate outcomes.     Chelsey Robbins MD

## 2024-11-12 NOTE — PROGRESS NOTES
Stacey Heath is a 54 y.o. female on day 3 of admission presenting with Congestive heart failure, unspecified HF chronicity, unspecified heart failure type.      Subjective   Seen on HD. Asymptomatic but very concerned about recurrent hospitalizations for uncontrolled htn and pulmonary edema. Stresses that she is compliant with home medications.     Objective   Vitals 24HR  Heart Rate:  [75-91]   Temp:  [35.6 °C (96.1 °F)-36.5 °C (97.7 °F)]   Resp:  [18-27]   BP: (127-193)/(63-85)   SpO2:  [96 %-100 %]     Intake/Output last 3 Shifts:    Intake/Output Summary (Last 24 hours) at 11/12/2024 1107  Last data filed at 11/12/2024 1100  Gross per 24 hour   Intake 730 ml   Output 100 ml   Net 630 ml       Physical Exam  No distress  HEENT:  moist, no pallor  No edema dar LE  Breath sounds dar equal, clear  S1 S2 regular, normal, no rub or murmur  Abdomen soft  AAO x3, non focal     apixaban, 5 mg, oral, BID  aspirin, 81 mg, oral, Daily  atorvastatin, 80 mg, oral, Nightly  calcium acetate, 1,334 mg, oral, TID  carvedilol, 50 mg, oral, BID  epoetin joceline, 10,000 Units, subcutaneous, Once per day on Monday Wednesday Friday  fluticasone, 2 spray, Each Nostril, Daily  fluticasone furoate-vilanteroL, 1 puff, inhalation, Daily  gabapentin, 300 mg, oral, Nightly  hydrALAZINE, 100 mg, oral, TID  ipratropium-albuteroL, 3 mL, nebulization, TID after meals  isosorbide mononitrate ER, 120 mg, oral, Daily  NIFEdipine ER, 90 mg, oral, Daily before breakfast  polyethylene glycol, 17 g, oral, Daily  torsemide, 40 mg, oral, Once per day on Sunday Monday Wednesday Friday  vitamin B complex-vitamin C-folic acid, 1 capsule, oral, Daily      Assessment/Plan      54 year old with h/o ESRD on HD with repeated admissions for uncontrolled htn and pulmonary edema.   Given repeated episodes, reasonable to check for renal artery stenosis by CTA /MRA- dar CHANDAN can cause [place] syndrome (repeated uncontrolled htn with flash pulmonary edema) even in  ESRD patients. Also discussed with her that since she does not tolerate UF well, I recommend alternate day HD and IUF in an attempt to achieve euvolemia. She is agreeable with the plan.  Plan d/w Dr. Robbins.

## 2024-11-12 NOTE — PROGRESS NOTES
Stacey Heath is a 54 y.o. female on day 3 of admission presenting with Congestive heart failure, unspecified HF chronicity, unspecified heart failure type.    Transfer Note    Mrs. Stacey Heath is a 54 y.o. female with PMH significant for ESRD 2/2 on dialysis T/Th/Sat via RUE AVF (last complete session 11/07/2024), HTN, HFpEF, COPD, brachial DVT on Eliquis who presented for [facility] ED with a chief complaint of SOB, found to have SBP >200 and briefly required BiPAP in the ED. She was admitted to the CICU for the management of hypertensive emergency with flash pulmonary edema. Suspect 2/2 resistant hypertension vs medication noncompliance.  Was treated with Nicardipine gtt and resumed some home BP meds. SBP dropped to 130s so Nicardipine was held. Elevated again to 180s-190s, resumed nifedipine 11/9 . She underwent HD 11/9 .     Things to floor to follow up on:   [ ] Holding home Clonidine patch, hydral 100 TID and valsartan: can resume as needed   [ ] Resume home torsemide on non HD days   [ ] Consider MRA or Entresto   [ ] Continue renal recs for HD  [ ] Determine if need to continue Eliquis as it has been >6 months and was previously provoked     Subjective   Patient seen and evaluated at bedside. No overnight events per nursing staff. Transfer to floor order in     Objective     Last Recorded Vitals  /62   Pulse 85   Temp 36.6 °C (97.9 °F)   Resp 18   Wt 60.5 kg (133 lb 6.1 oz)   SpO2 100%   Intake/Output last 3 Shifts:    Intake/Output Summary (Last 24 hours) at 11/12/2024 1602  Last data filed at 11/12/2024 1235  Gross per 24 hour   Intake 1240 ml   Output 1500 ml   Net -260 ml       Admission Weight  Weight: 55.3 kg (122 lb) (11/08/24 1951)    Daily Weight  11/09/24 : 60.5 kg (133 lb 6.1 oz)    Image Results  Electrocardiogram, 12-lead PRN ACS symptoms  Normal sinus rhythm  ST & T wave abnormality, consider lateral ischemia  Abnormal ECG  When compared with ECG of 08-NOV-2024 23:49,  No significant  change was found      Physical Exam  Constitutional:       Appearance: Normal appearance.   HENT:      Head: Normocephalic and atraumatic.      Mouth/Throat:      Pharynx: Oropharynx is clear.   Cardiovascular:      Rate and Rhythm: Normal rate and regular rhythm.   Pulmonary:      Effort: Pulmonary effort is normal.      Breath sounds: Normal breath sounds.   Abdominal:      General: Abdomen is flat. Bowel sounds are normal.      Palpations: Abdomen is soft.   Musculoskeletal:         General: Normal range of motion.      Right lower leg: No edema.      Left lower leg: No edema.   Skin:     General: Skin is warm and dry.   Neurological:      General: No focal deficit present.      Mental Status: She is alert and oriented to person, place, and time.         Scheduled medications  apixaban, 5 mg, oral, BID  aspirin, 81 mg, oral, Daily  atorvastatin, 80 mg, oral, Nightly  calcium acetate, 1,334 mg, oral, TID  carvedilol, 50 mg, oral, BID  [START ON 11/13/2024] epoetin joceline-epbx, 10,000 Units, intravenous, Once per day on Monday Wednesday Friday  fluticasone, 2 spray, Each Nostril, Daily  fluticasone furoate-vilanteroL, 1 puff, inhalation, Daily  gabapentin, 300 mg, oral, Nightly  hydrALAZINE, 100 mg, oral, TID  ipratropium-albuteroL, 3 mL, nebulization, TID after meals  isosorbide mononitrate ER, 120 mg, oral, Daily  NIFEdipine ER, 90 mg, oral, Daily before breakfast  polyethylene glycol, 17 g, oral, Daily  torsemide, 40 mg, oral, Once per day on Sunday Monday Wednesday Friday  vitamin B complex-vitamin C-folic acid, 1 capsule, oral, Daily      Continuous medications     PRN medications  PRN medications: acetaminophen, albuterol, hydrOXYzine HCL, oxygen, oxygen, pantoprazole, traMADol    Results for orders placed or performed during the hospital encounter of 11/08/24 (from the past 24 hours)   CBC and Auto Differential   Result Value Ref Range    WBC 7.3 4.4 - 11.3 x10*3/uL    nRBC 0.3 (H) 0.0 - 0.0 /100 WBCs    RBC  2.97 (L) 4.00 - 5.20 x10*6/uL    Hemoglobin 8.8 (L) 12.0 - 16.0 g/dL    Hematocrit 28.9 (L) 36.0 - 46.0 %    MCV 97 80 - 100 fL    MCH 29.6 26.0 - 34.0 pg    MCHC 30.4 (L) 32.0 - 36.0 g/dL    RDW 18.5 (H) 11.5 - 14.5 %    Platelets 212 150 - 450 x10*3/uL    Neutrophils % 64.5 40.0 - 80.0 %    Immature Granulocytes %, Automated 1.4 (H) 0.0 - 0.9 %    Lymphocytes % 13.7 13.0 - 44.0 %    Monocytes % 7.9 2.0 - 10.0 %    Eosinophils % 11.4 0.0 - 6.0 %    Basophils % 1.1 0.0 - 2.0 %    Neutrophils Absolute 4.71 1.20 - 7.70 x10*3/uL    Immature Granulocytes Absolute, Automated 0.10 0.00 - 0.70 x10*3/uL    Lymphocytes Absolute 1.00 (L) 1.20 - 4.80 x10*3/uL    Monocytes Absolute 0.58 0.10 - 1.00 x10*3/uL    Eosinophils Absolute 0.83 (H) 0.00 - 0.70 x10*3/uL    Basophils Absolute 0.08 0.00 - 0.10 x10*3/uL   Renal Function Panel   Result Value Ref Range    Glucose 77 74 - 99 mg/dL    Sodium 139 136 - 145 mmol/L    Potassium 5.6 (H) 3.5 - 5.3 mmol/L    Chloride 99 98 - 107 mmol/L    Bicarbonate 25 21 - 32 mmol/L    Anion Gap 21 (H) 10 - 20 mmol/L    Urea Nitrogen 62 (H) 6 - 23 mg/dL    Creatinine 11.16 (H) 0.50 - 1.05 mg/dL    eGFR 4 (L) >60 mL/min/1.73m*2    Calcium 9.9 8.6 - 10.6 mg/dL    Phosphorus 6.4 (H) 2.5 - 4.9 mg/dL    Albumin 3.8 3.4 - 5.0 g/dL   Magnesium   Result Value Ref Range    Magnesium 2.76 (H) 1.60 - 2.40 mg/dL     *Note: Due to a large number of results and/or encounters for the requested time period, some results have not been displayed. A complete set of results can be found in Results Review.     No results found.         Assessment/Plan   54 y.o. female with PMH significant for ESRD 2/2 on dialysis T/Th/Sat via RUE AVF (last complete session 11/07/2024), HTN, HFpEF, COPD, brachial DVT on Eliquis who presented for [facility] ED with a chief complaint of SOB, found to have SBP >200 and briefly required BiPAP in the ED. She is admitted to the CICU for the management of hypertensive emergency with flash  pulmonary edema. Suspect 2/2 medication noncompliance.  Was treated with Nicardipine gtt and resume some home BP meds. SBP dropped to 130s so stopped Nicardipine. Elevated again to 180s-190s, resumed nifedipine PO 11/9 and underwent HD yesterday.    Updates 11/11  - Will transfer to floor      NEUROLOGIC  #Peripheral neuropathy   - c/w home gabapentin      CARDIOVASCULAR  #HTN emergency with flash pulmonary edema   #HFpEF  #tropenemia, 2/2 TII MI in setting of marked HTN and ESRD   :: home meds: Coreg 50 mg BID, Clonidine 0.2 mg/24 hr patch- 1 patch on skin every 7 days, Hydralazine 100 mg TID , Imdur 120 mg every day , Nifedipine 90 mg every day , Valsartan 320 mg every day   :: TTE 04/2024- LVEF 60-65% with pseudonormal pattern of left ventricular diastolic filling.   :: Renal US 2023 --> Doppler evaluation compatible with history of chronic renal failure on dialysis.   :: Seen by sleep medicine 11/2024 who felt she likely had sleep apnea based on h/p- planning for outpatient sleep study   :: given imdur and hydral in ED   Plan:  - Continue with Imdur every day, coreg 50 BID, nifedipine 90, hydral 100 TID    - Can resume other BP meds as above as needed   - As per her HFpEF not currently on MRA given renal function, could consider SGLT2i- has not been initiated likely 2/2 ESRD on iHD   - Consider optimizing home BP reg, would favor initiation of Entresto     #HLD   - c/w home statin, aspirin      PULMONARY  #AHRF  :: s/p IV lasix 40 mg in the ED   :: Resumed med Torsemide 40 mg every day on non HD days  :: Bedside POCUS with IVC collapsible    :: Impression: likely 2/2 flash pulm edema, likely 2/2 fluid redistribution as opposed to fluid overload. Clinical picture not endorsing infection (no fevers/leukocytosis)    Plan:  - Control BP as above  - Nephro consulted for HD today   - Wean O2 as able      #COPD without acute exacerbation   - continue home Breo Ellipta 100-25 1 puff daily   - Duoneb q4H scheduled,  albuterol q2H PRN      RENAL/  #ESRD on iHD T/TH/SAT  - c/w home Nephrocaps , Phoslo   - Resumed home Torsemide 40 mg every day on non HD days   - Nephro c/s in AM for iHD      GASTROINTESTINAL  #GERD  - c/w home PPI      ENDOCRINE  No active issues      HEME/ONC  #Normocytic anemia, likely 2/2 renal disease  - c/w  Epoetin joceline 15051 units three times a week      #Hx of LUE DVT  :: per chart review appears that brachial vein DVT in setting of PICC --> provoked   Plan:  - c/w eliquis for now, suspect can be discontinued as > 6 month duration of therapy has now been completed. Will defer to floor team for final decision      INFECTIOUS DISEASE  No active issues      MSK/DERM  No active issues      F: PRN for euvolemia, caution ESRD on iHD   E: PRN K>4, Mg>2, P>3   N: Renal diet  A: PIV, RUE AVF   Oxygen: 2L NC   Abx: Ceftriaxone/Azithro (11/08)  Gtts: None    DVT ppx: Eliquis for now   GI ppx: home PPI   Code Status: full code (confirmed on admission)  Surrogate Medical Decision-maker:    Hai Pinedo ([facility]) [phone]        Bowen Paez MD

## 2024-11-12 NOTE — PROGRESS NOTES
Physical Therapy    Physical Therapy Evaluation    Patient Name: Stacey Heath  MRN: [id number]  Department: [place] [address]  Room: 2026/2026-A  Today's Date: 11/12/2024   Time Calculation  Start Time: 1442  Stop Time: 1502  Time Calculation (min): 20 min    Assessment/Plan   PT Assessment  PT Assessment Results: Decreased endurance, Impaired balance, Decreased mobility, Pain, Decreased strength  Rehab Prognosis: Good  Barriers to Discharge: none  Evaluation/Treatment Tolerance: Patient tolerated treatment well, Patient limited by fatigue  Medical Staff Made Aware: Yes  Strengths: Ability to acquire knowledge, Access to adaptive/assistive products, Support of extended family/friends  Barriers to Participation: Housing layout, Comorbidities  End of Session Communication: Bedside nurse  Assessment Comment: Pt. is a 54 yof that presents with impairments including increased L sided abdominal/flank pain, decreased functional strength, decreased activity tolerance/endurance, mildly impaired balance, and increased difficulty with functional mobility compared to baseline level of function. Pt. will benefit from skilled PT intervention while inpatient to address the above deficits and maximize return to PLOF. Anticipate pt will benefit from LOW intensity PT upon discharge.  End of Session Patient Position: Bed, 3 rail up, Alarm off, not on at start of session    IP OR SWING BED PT PLAN  Inpatient or Swing Bed: Inpatient  PT Plan  Treatment/Interventions: Bed mobility, Transfer training, Stair training, Gait training, Balance training, Strengthening, Endurance training, Therapeutic exercise, Therapeutic activity, Home exercise program  PT Plan: Ongoing PT  PT Frequency: 3 times per week  PT Discharge Recommendations: Low intensity level of continued care  Equipment Recommended upon Discharge:  (Pt owns FWW and cane)  PT Recommended Transfer Status: Stand by assist  PT - OK to Discharge: Yes    Subjective   General Visit  Information:  General  Reason for Referral: Pt presented to ED with a chief complaint of SOB, found to have SBP >200 and briefly required BiPAP in the ED. Pt was admitted to the CICU for the management of hypertensive emergency with flash pulmonary edema. 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  Family/Caregiver Present: No  Prior to Session Communication: Bedside nurse  Patient Position Received: Bed, 3 rail up, Alarm off, not on at start of session  Preferred Learning Style: auditory, verbal  General Comment: Pt received supine in bed, pleasant and agreeable to participate in session    Home Living:  Home Living  Type of Home: House  Lives With: Significant other, Dependent children (fifrida (works), 15 yo nephew)  Home Adaptive Equipment: Cane, Walker rolling or standard  Home Layout: Multi-level, Bed/bath upstairs  Alternate Level Stairs-Number of Steps: 12  Home Access: Stairs to enter with rails  Entrance Stairs-Number of Steps: 5-6  Bathroom Shower/Tub: Tub/shower unit  Bathroom Equipment: None    Prior Level of Function:  Prior Function Per Pt/Caregiver Report  Level of [place]: Independent with ADLs and functional transfers, Independent with homemaking with ambulation  Receives Help From: Family  ADL Assistance: Independent  Homemaking Assistance: Independent  Ambulatory Assistance: Independent (IND for short distances, reports furniture walks, limited by SOB, denied falls)  Vocational: On disability  Leisure: Enjoys spending time with family/grandson  Prior Function Comments: -drives, fifrida completes    Precautions:  Precautions  Hearing/Visual Limitations: WFL  Medical Precautions: Fall precautions     Vital Signs (Past 2hrs)        Date/Time Vitals Session Patient Position Pulse Resp SpO2 BP MAP (mmHg)    11/12/24 1442 Pre PT  --  85  --  100 %  148/62  91                          Objective   Pain:  Pain Assessment  Pain Assessment: 0-10  0-10 (Numeric) Pain Score: 3  Pain Type: Acute pain  Pain Location:  (L side of abdomen)    Cognition:  Cognition  Overall Cognitive Status: Within Functional Limits  Orientation Level: Oriented X4    General Assessments:  Activity Tolerance  Endurance: Tolerates 10 - 20 min exercise with multiple rests  Early Mobility/Exercise Safety Screen: Proceed with mobilization - No exclusion criteria met  Activity Tolerance Comments: Pt tolerated ambulating in hallways, endorsing fatigue    Sensation  Light Touch: No apparent deficits  Sensation Comment: Pt denied N/T    Postural Control  Postural Control: Within Functional Limits    Static Sitting Balance  Static Sitting-Level of Assistance: Independent  Dynamic Sitting Balance  Dynamic Sitting-Level of Assistance: Independent  Dynamic Sitting-Comments: Pt donned socks while seated EOB, no acute LOB noted    Static Standing Balance  Static Standing-Level of Assistance: Close supervision  Dynamic Standing Balance  Dynamic Standing-Level of Assistance:  (SBA-CGA)    Functional Assessments:  Bed Mobility  Bed Mobility: Yes  Bed Mobility 1  Bed Mobility 1: Supine to sitting, Sitting to supine  Level of Assistance 1: Distant supervision  Bed Mobility Comments 1: HOB elevated significantly    Transfers  Transfer: Yes  Transfer 1  Transfer From 1: Sit to, Stand to  Transfer to 1: Stand, Sit  Technique 1: Sit to stand, Stand to sit  Transfer Device 1:  (none)  Transfer Level of Assistance 1: Close supervision    Ambulation/Gait Training  Ambulation/Gait Training Performed: Yes  Ambulation/Gait Training 1  Surface 1: Level tile  Device 1:  (Intermittent use of hallway railing)  Assistance 1:  (SBA with UE support, CGAx1 without UE support)  Quality of Gait 1: Decreased step length (slow brendon, x1 LOB laterally (L) though pt able to regain with CGAx1)  Comments/Distance (ft) 1: 180 ft    Stairs  Stairs: No (Pt politely  declined, endorsing fatigue)    Extremity/Trunk Assessments:  RLE   RLE : Within Functional Limits  LLE   LLE : Within Functional Limits    Outcome Measures:  [facility] Basic Mobility  Turning from your back to your side while in a flat bed without using bedrails: A little  Moving from lying on your back to sitting on the side of a flat bed without using bedrails: A little  Moving to and from bed to chair (including a wheelchair): A little  Standing up from a chair using your arms (e.g. wheelchair or bedside chair): A little  To walk in hospital room: A little  Climbing 3-5 steps with railing: A lot  Basic Mobility - Total Score: 17    Encounter Problems       Encounter Problems (Active)       Balance       Patient to demonstrate [place] static and dynamic standing balance without LOB with change of directions, no hesitancy, appropriate JAE and sequencing to complete functional task.        Start:  11/12/24    Expected End:  11/26/24            Pt will score >/= 24/28 on Tinetti balance assessment to indicate low falls risk.         Start:  11/12/24    Expected End:  11/26/24               Mobility       STG - Patient will ambulate >/= 250 ft [place] with LRAD       Start:  11/12/24    Expected End:  11/26/24            Patient to ascend/descend >/= 12 stairs with HR and SBA to demo ability to safely traverse NIYA and within home       Start:  11/12/24    Expected End:  11/26/24            Pt. will tolerate >/= 20 minutes of OOB mobility without seated rest break and VSS to demo improved activity tolerance/endurance.        Start:  11/12/24    Expected End:  11/26/24               PT Transfers       STG - Patient will perform bed mobility [place] with HOB flat and no rails       Start:  11/12/24    Expected End:  11/26/24               Pain - Adult              Education Documentation  Body Mechanics, taught by Karishma Andrade, PT at 11/12/2024  3:17 PM.  Learner: Patient  Readiness: Acceptance  Method: Explanation,  Demonstration  Response: Verbalizes Understanding  Comment: activity pacing, importance of mobilizing multiple times per day to prevent further deconditioning    Mobility Training, taught by Karishma Andrade PT at 11/12/2024  3:17 PM.  Learner: Patient  Readiness: Acceptance  Method: Explanation, Demonstration  Response: Verbalizes Understanding  Comment: activity pacing, importance of mobilizing multiple times per day to prevent further deconditioning    Education Comments  No comments found.          11/12/24 at 3:19 PM - Karishma Andrade, PT

## 2024-11-12 NOTE — CARE PLAN
The patient's goals for the shift include      The clinical goals for the shift include Patient will remain free from injury during shift    Patient remained free from injury during shift. She had dialysis today.  She was up in the [facility] walking today.

## 2024-11-12 NOTE — NURSING NOTE
Report to Receiving RN:    Report To: PONCHO Hayes  Time Report Called: [id number]  Hand-Off Communication: Pt completed 3.25 hrs HD, 1L fluid removed, tolerated tx, /68, HR 82, pt stable, A/O.  Complications During Treatment: No  Ultrafiltration Treatment: Yes  Medications Administered During Dialysis: No  Blood Products Administered During Dialysis: No  Labs Sent During Dialysis: No  Heparin Drip Rate Changes: No  Dialysis Catheter Dressing: N/A, AVF  Last Dressing Change: N/A        Last Updated: 12:18 PM by SAKINA MOELLER

## 2024-11-13 ENCOUNTER — APPOINTMENT (OUTPATIENT)
Dept: RADIOLOGY | Facility: HOSPITAL | Age: 54
DRG: 280 | End: 2024-11-13
Payer: COMMERCIAL

## 2024-11-13 ENCOUNTER — DOCUMENTATION (OUTPATIENT)
Dept: BEHAVIORAL HEALTH | Facility: CLINIC | Age: 54
End: 2024-11-13

## 2024-11-13 ENCOUNTER — DOCUMENTATION (OUTPATIENT)
Dept: PSYCHIATRY | Facility: HOSPITAL | Age: 54
End: 2024-11-13
Payer: COMMERCIAL

## 2024-11-13 ENCOUNTER — APPOINTMENT (OUTPATIENT)
Dept: DIALYSIS | Facility: HOSPITAL | Age: 54
End: 2024-11-13
Payer: COMMERCIAL

## 2024-11-13 LAB
ALBUMIN SERPL BCP-MCNC: 3.5 G/DL (ref 3.4–5)
AMPHETAMINES SERPL QL SCN: NEGATIVE NG/ML
ANION GAP SERPL CALC-SCNC: 16 MMOL/L (ref 10–20)
ANNOTATION COMMENT IMP: NORMAL
BARBITURATES SERPL QL SCN: NEGATIVE NG/ML
BASOPHILS # BLD AUTO: 0.06 X10*3/UL (ref 0–0.1)
BASOPHILS NFR BLD AUTO: 1 %
BENZODIAZ SERPL QL SCN: NEGATIVE NG/ML
BUN SERPL-MCNC: 36 MG/DL (ref 6–23)
BUPRENORPHINE SERPL-MCNC: NEGATIVE NG/ML
CALCIUM SERPL-MCNC: 9.5 MG/DL (ref 8.6–10.6)
CANNABINOIDS SERPL QL SCN: NEGATIVE NG/ML
CHLORIDE SERPL-SCNC: 97 MMOL/L (ref 98–107)
CO2 SERPL-SCNC: 29 MMOL/L (ref 21–32)
COCAINE SERPL QL SCN: NEGATIVE NG/ML
CREAT SERPL-MCNC: 6.97 MG/DL (ref 0.5–1.05)
EGFRCR SERPLBLD CKD-EPI 2021: 7 ML/MIN/1.73M*2
EOSINOPHIL # BLD AUTO: 0.74 X10*3/UL (ref 0–0.7)
EOSINOPHIL NFR BLD AUTO: 12.2 %
ERYTHROCYTE [DISTWIDTH] IN BLOOD BY AUTOMATED COUNT: 18.2 % (ref 11.5–14.5)
GLUCOSE SERPL-MCNC: 93 MG/DL (ref 74–99)
HCT VFR BLD AUTO: 26.7 % (ref 36–46)
HGB BLD-MCNC: 8.2 G/DL (ref 12–16)
IMM GRANULOCYTES # BLD AUTO: 0.08 X10*3/UL (ref 0–0.7)
IMM GRANULOCYTES NFR BLD AUTO: 1.3 % (ref 0–0.9)
LYMPHOCYTES # BLD AUTO: 1 X10*3/UL (ref 1.2–4.8)
LYMPHOCYTES NFR BLD AUTO: 16.4 %
MAGNESIUM SERPL-MCNC: 2.35 MG/DL (ref 1.6–2.4)
MCH RBC QN AUTO: 29.4 PG (ref 26–34)
MCHC RBC AUTO-ENTMCNC: 30.7 G/DL (ref 32–36)
MCV RBC AUTO: 96 FL (ref 80–100)
METHADONE SERPL QL SCN: NEGATIVE NG/ML
METHAMPHET SERPL QL: NEGATIVE NG/ML
MONOCYTES # BLD AUTO: 0.64 X10*3/UL (ref 0.1–1)
MONOCYTES NFR BLD AUTO: 10.5 %
NEUTROPHILS # BLD AUTO: 3.57 X10*3/UL (ref 1.2–7.7)
NEUTROPHILS NFR BLD AUTO: 58.6 %
NRBC BLD-RTO: 0 /100 WBCS (ref 0–0)
OPIATES SERPL QL SCN: NEGATIVE NG/ML
OXYCODONE SERPL QL: NEGATIVE NG/ML
PCP SERPL QL SCN: NEGATIVE NG/ML
PHOSPHATE SERPL-MCNC: 5.1 MG/DL (ref 2.5–4.9)
PLATELET # BLD AUTO: 176 X10*3/UL (ref 150–450)
POTASSIUM SERPL-SCNC: 4.9 MMOL/L (ref 3.5–5.3)
RBC # BLD AUTO: 2.79 X10*6/UL (ref 4–5.2)
SODIUM SERPL-SCNC: 137 MMOL/L (ref 136–145)
WBC # BLD AUTO: 6.1 X10*3/UL (ref 4.4–11.3)

## 2024-11-13 PROCEDURE — 2500000002 HC RX 250 W HCPCS SELF ADMINISTERED DRUGS (ALT 637 FOR MEDICARE OP, ALT 636 FOR OP/ED): Performed by: STUDENT IN AN ORGANIZED HEALTH CARE EDUCATION/TRAINING PROGRAM

## 2024-11-13 PROCEDURE — 6350000001 HC RX 635 EPOETIN >10,000 UNITS: Mod: JZ | Performed by: NURSE PRACTITIONER

## 2024-11-13 PROCEDURE — 2500000001 HC RX 250 WO HCPCS SELF ADMINISTERED DRUGS (ALT 637 FOR MEDICARE OP): Performed by: STUDENT IN AN ORGANIZED HEALTH CARE EDUCATION/TRAINING PROGRAM

## 2024-11-13 PROCEDURE — 97165 OT EVAL LOW COMPLEX 30 MIN: CPT | Mod: GO

## 2024-11-13 PROCEDURE — 94660 CPAP INITIATION&MGMT: CPT

## 2024-11-13 PROCEDURE — 2500000001 HC RX 250 WO HCPCS SELF ADMINISTERED DRUGS (ALT 637 FOR MEDICARE OP)

## 2024-11-13 PROCEDURE — 8010000001 HC DIALYSIS - HEMODIALYSIS PER DAY

## 2024-11-13 PROCEDURE — 1100000001 HC PRIVATE ROOM DAILY

## 2024-11-13 PROCEDURE — 80069 RENAL FUNCTION PANEL: CPT

## 2024-11-13 PROCEDURE — 94640 AIRWAY INHALATION TREATMENT: CPT

## 2024-11-13 PROCEDURE — 2500000004 HC RX 250 GENERAL PHARMACY W/ HCPCS (ALT 636 FOR OP/ED): Performed by: STUDENT IN AN ORGANIZED HEALTH CARE EDUCATION/TRAINING PROGRAM

## 2024-11-13 PROCEDURE — 36415 COLL VENOUS BLD VENIPUNCTURE: CPT

## 2024-11-13 PROCEDURE — 2500000001 HC RX 250 WO HCPCS SELF ADMINISTERED DRUGS (ALT 637 FOR MEDICARE OP): Performed by: INTERNAL MEDICINE

## 2024-11-13 PROCEDURE — 83735 ASSAY OF MAGNESIUM: CPT

## 2024-11-13 PROCEDURE — 90935 HEMODIALYSIS ONE EVALUATION: CPT | Performed by: INTERNAL MEDICINE

## 2024-11-13 PROCEDURE — 97535 SELF CARE MNGMENT TRAINING: CPT | Mod: GO

## 2024-11-13 PROCEDURE — 99232 SBSQ HOSP IP/OBS MODERATE 35: CPT | Performed by: STUDENT IN AN ORGANIZED HEALTH CARE EDUCATION/TRAINING PROGRAM

## 2024-11-13 PROCEDURE — 85025 COMPLETE CBC W/AUTO DIFF WBC: CPT

## 2024-11-13 PROCEDURE — 74174 CTA ABD&PLVS W/CONTRAST: CPT | Performed by: RADIOLOGY

## 2024-11-13 PROCEDURE — 2550000001 HC RX 255 CONTRASTS: Performed by: STUDENT IN AN ORGANIZED HEALTH CARE EDUCATION/TRAINING PROGRAM

## 2024-11-13 PROCEDURE — 74174 CTA ABD&PLVS W/CONTRAST: CPT

## 2024-11-13 RX ORDER — DIPHENHYDRAMINE HYDROCHLORIDE 50 MG/ML
50 INJECTION INTRAMUSCULAR; INTRAVENOUS ONCE
Status: DISCONTINUED | OUTPATIENT
Start: 2024-11-13 | End: 2024-11-13

## 2024-11-13 RX ORDER — CLONIDINE HYDROCHLORIDE 0.1 MG/1
0.2 TABLET ORAL ONCE
Status: COMPLETED | OUTPATIENT
Start: 2024-11-13 | End: 2024-11-13

## 2024-11-13 RX ORDER — DIPHENHYDRAMINE HYDROCHLORIDE 50 MG/ML
50 INJECTION INTRAMUSCULAR; INTRAVENOUS ONCE
Status: COMPLETED | OUTPATIENT
Start: 2024-11-13 | End: 2024-11-13

## 2024-11-13 ASSESSMENT — PAIN - FUNCTIONAL ASSESSMENT
PAIN_FUNCTIONAL_ASSESSMENT: 0-10

## 2024-11-13 ASSESSMENT — COGNITIVE AND FUNCTIONAL STATUS - GENERAL
MOBILITY SCORE: 22
WALKING IN HOSPITAL ROOM: A LITTLE
DAILY ACTIVITIY SCORE: 24
WALKING IN HOSPITAL ROOM: A LITTLE
EATING MEALS: A LITTLE
TOILETING: A LITTLE
CLIMB 3 TO 5 STEPS WITH RAILING: A LITTLE
HELP NEEDED FOR BATHING: A LITTLE
DRESSING REGULAR UPPER BODY CLOTHING: A LITTLE
MOBILITY SCORE: 22
MOBILITY SCORE: 22
DAILY ACTIVITIY SCORE: 24
DRESSING REGULAR LOWER BODY CLOTHING: A LITTLE
DAILY ACTIVITIY SCORE: 23
WALKING IN HOSPITAL ROOM: A LITTLE
CLIMB 3 TO 5 STEPS WITH RAILING: A LITTLE
PATIENT BASELINE BEDBOUND: NO
PERSONAL GROOMING: A LITTLE
DAILY ACTIVITIY SCORE: 18
PERSONAL GROOMING: A LITTLE
CLIMB 3 TO 5 STEPS WITH RAILING: A LITTLE

## 2024-11-13 ASSESSMENT — PAIN DESCRIPTION - ORIENTATION
ORIENTATION: LEFT
ORIENTATION: LEFT

## 2024-11-13 ASSESSMENT — PAIN DESCRIPTION - LOCATION
LOCATION: ABDOMEN
LOCATION: ABDOMEN

## 2024-11-13 ASSESSMENT — ACTIVITIES OF DAILY LIVING (ADL)
ADL_ASSISTANCE: INDEPENDENT
BATHING_ASSISTANCE: MODIFIED INDEPENDENT (DEVICE)
HOME_MANAGEMENT_TIME_ENTRY: 8

## 2024-11-13 ASSESSMENT — PAIN SCALES - GENERAL
PAINLEVEL_OUTOF10: 5 - MODERATE PAIN
PAINLEVEL_OUTOF10: 3
PAINLEVEL_OUTOF10: 8
PAINLEVEL_OUTOF10: 6
PAINLEVEL_OUTOF10: 7
PAINLEVEL_OUTOF10: 0 - NO PAIN

## 2024-11-13 NOTE — CARE PLAN
The patient's goals for the shift include      The clinical goals for the shift include   Problem: Fall/Injury  Goal: Not fall by end of shift  Outcome: Progressing  Goal: Be free from injury by end of the shift  Outcome: Progressing  Goal: Verbalize understanding of personal risk factors for fall in the hospital  Outcome: Progressing  Goal: Verbalize understanding of risk factor reduction measures to prevent injury from fall in the home  Outcome: Progressing  Goal: Use assistive devices by end of the shift  Outcome: Progressing  Goal: Pace activities to prevent fatigue by end of the shift  Outcome: Progressing     Problem: Pain - Adult  Goal: Verbalizes/displays adequate comfort level or baseline comfort level  Outcome: Progressing     Problem: Safety - Adult  Goal: Free from fall injury  Outcome: Progressing     Problem: Discharge Planning  Goal: Discharge to home or other facility with appropriate resources  Outcome: Progressing     Problem: Chronic Conditions and Co-morbidities  Goal: Patient's chronic conditions and co-morbidity symptoms are monitored and maintained or improved  Outcome: Progressing     Problem: Heart Failure  Goal: Improved gas exchange this shift  Outcome: Progressing  Goal: Improved urinary output this shift  Outcome: Progressing  Goal: Reduction in peripheral edema within 24 hours  Outcome: Progressing  Goal: Report improvement of dyspnea/breathlessness this shift  Outcome: Progressing  Goal: Weight from fluid excess reduced over 2-3 days, then stabilize  Outcome: Progressing  Goal: Increase self care and/or family involvement in 24 hours  Outcome: Progressing     Problem: Diabetes  Goal: Achieve decreasing blood glucose levels by end of shift  Outcome: Progressing  Goal: Increase stability of blood glucose readings by end of shift  Outcome: Progressing  Goal: Decrease in ketones present in urine by end of shift  Outcome: Progressing  Goal: Maintain electrolyte levels within acceptable  range throughout shift  Outcome: Progressing  Goal: Maintain glucose levels >70mg/dl to <250mg/dl throughout shift  Outcome: Progressing  Goal: No changes in neurological exam by end of shift  Outcome: Progressing  Goal: Learn about and adhere to nutrition recommendations by end of shift  Outcome: Progressing  Goal: Vital signs within normal range for age by end of shift  Outcome: Progressing  Goal: Increase self care and/or family involovement by end of shift  Outcome: Progressing  Goal: Receive DSME education by end of shift  Outcome: Progressing

## 2024-11-13 NOTE — PROGRESS NOTES
Attempted to meet with pt to discuss care team reccs for LOW intensity but pt off the floor at this time at . Will attempt to meet with pt at a later time.

## 2024-11-13 NOTE — CONSULTS
"Nutrition Initial Assessment:   Nutrition Assessment    Reason for Assessment: Admission nursing screening    Patient is a 54 y.o. female presenting with congestive heart failure after arriving to ED with chief complaint of SOB. 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      Nutrition History:  Energy Intake: Poor < 50 %  Food and Nutrient History: Pt reports appetite has not been good for 2+ weeks due to foods tasting differently as well as nausea. Previous RD notes in 04/24 and 05/24, pt reported poor appetite due to altered taste for multiple months. Pt reports typically consuming <50% of her meals. Pt amennable to trying Magic Cup BID. Per previous RD note (11/11/24), pt would not like to be on renal diet because it makes it harder for her to choose foods she likes. Will monitor labs for trends, currently labs are lower than yesterday (11/12/24).  Food Allergies/Intolerances:   shellfish  GI Symptoms: Nausea and Abdominal pain  Oral Problems: None       Anthropometrics:  Height: 139.7 cm (4' 7\")   Weight: 60.5 kg (133 lb 6.1 oz)   BMI (Calculated): 31  IBW/kg (Dietitian Calculated): 34 kg  Percent of IBW: 177 %  Adjusted Body Weight (kg): 40.6 kg    Weight History:   Wt Readings from Last 20 Encounters:   11/09/24 60.5 kg (133 lb 6.1 oz)   11/08/24 55.4 kg (122 lb 1.6 oz) -(9% gain)   11/06/24 47.2 kg (104 lb)   11/01/24 53.1 kg (117 lb)   10/24/24 57.6 kg (126 lb 15.8 oz)   10/07/24 52.6 kg (116 lb)   10/01/24 52.6 kg (116 lb)   09/29/24 52.6 kg (116 lb)   09/24/24 52.6 kg (116 lb)   09/20/24 52.2 kg (115 lb 1.3 oz)   08/29/24 53.5 kg (118 lb)   08/28/24 53.5 kg (118 lb)   08/23/24 51.3 kg (113 lb 1.5 oz)   08/12/24 54 kg (119 lb 0.8 oz)   08/09/24 63.4 kg (139 lb 12.4 oz)   07/27/24 50.3 kg (111 lb)   07/25/24 51.5 kg (113 lb 8.6 oz)   07/08/24 54.4 kg (119 lb 14.9 oz)   07/01/24 58.3 kg (128 lb 8.5 oz)   06/04/24 54.4 kg (120 lb)       Weight Change " %:  Weight History / % Weight Change: Pt reports not noticing any recent weight loss. Per chart review, pt with 9% weight gain, likely in the setting of ESRD and HF. Pt noted to have had flash pulmonary edema on 11/09. Previously, pt with 11% weight loss x 1 week (11/01/24-11/06/24), pt's weight was stable prior to that. Per chart, pt's EDW is ~51.5 kg  Significant Weight Gain: Fluid related    Nutrition Focused Physical Exam Findings:    Subcutaneous Fat Loss:   Orbital Fat Pads: Mild-Moderate (slight dark circles and slight hollowing)  Buccal Fat Pads: Well nourished (full, rounded cheeks)  Muscle Wasting:  Temporalis: Mild-Moderate (slight depression)  Pectoralis (Clavicular Region): Well nourished (clavicle not visible)  Interosseous: Mild-Moderate (slightly depressed area between thumb and forefinger)  Quadriceps: Mild-Moderate (mild depression on inner and outer thigh)  Gastrocnemius: Mild-Moderate (not well developed muscle)  Edema:  Edema: +1 trace  Edema Location: Generalized, previously had pulmonary edema  Physical Findings:  Skin: Negative    Nutrition Significant Labs:  CBC Trend:   Results from last 7 days   Lab Units 11/13/24  0703 11/12/24  0802 11/11/24  0414 11/10/24  0156   WBC AUTO x10*3/uL 6.1 7.3 6.4 4.8   RBC AUTO x10*6/uL 2.79* 2.97* 2.74* 3.08*   HEMOGLOBIN g/dL 8.2* 8.8* 8.1* 9.1*   HEMATOCRIT % 26.7* 28.9* 25.3* 30.4*   MCV fL 96 97 92 99   PLATELETS AUTO x10*3/uL 176 212 182 210    , BMP Trend:   Results from last 7 days   Lab Units 11/13/24  0703 11/12/24  0802 11/11/24  0414 11/10/24  0156   GLUCOSE mg/dL 93 77 79 97   CALCIUM mg/dL 9.5 9.9 9.7 9.6   SODIUM mmol/L 137 139 139 138   POTASSIUM mmol/L 4.9 5.6* 4.9 4.4   CO2 mmol/L 29 25 27 30   CHLORIDE mmol/L 97* 99 99 97*   BUN mg/dL 36* 62* 47* 23   CREATININE mg/dL 6.97* 11.16* 8.23* 5.49*    , BG POCT trend:    , Liver Function Trend:   Results from last 7 days   Lab Units 11/09/24  0320 11/08/24 2030   ALK PHOS U/L 90 117*   AST U/L  37 20   ALT U/L 12 9   BILIRUBIN TOTAL mg/dL 0.5 0.5    , Renal Lab Trend:   Results from last 7 days   Lab Units 11/13/24  0703 11/12/24  0802 11/11/24  0414 11/10/24  0156   POTASSIUM mmol/L 4.9 5.6* 4.9 4.4   PHOSPHORUS mg/dL 5.1* 6.4* 6.4* 5.9*   SODIUM mmol/L 137 139 139 138   MAGNESIUM mg/dL 2.35 2.76* 2.56* 2.23   EGFR mL/min/1.73m*2 7* 4* 5* 9*   BUN mg/dL 36* 62* 47* 23   CREATININE mg/dL 6.97* 11.16* 8.23* 5.49*    , Vit D:   Lab Results   Component Value Date    VITD25 18 (L) 05/30/2024    , Vit B12:   Lab Results   Component Value Date    JJAHYPTA20 312 01/16/2024    , Iron Panel:   Lab Results   Component Value Date    IRON 35 01/15/2024    TIBC 201 (L) 01/15/2024    FERRITIN 1,044 (H) 01/15/2024    , Folate:   Lab Results   Component Value Date    FOLATE 22.4 01/16/2024        Nutrition Specific Medications:  Scheduled medications  apixaban, 5 mg, oral, BID  aspirin, 81 mg, oral, Daily  atorvastatin, 80 mg, oral, Nightly  calcium acetate, 1,334 mg, oral, TID  carvedilol, 50 mg, oral, BID  epoetin joceline-epbx, 10,000 Units, intravenous, Once per day on Monday Wednesday Friday  fluticasone, 2 spray, Each Nostril, Daily  fluticasone furoate-vilanteroL, 1 puff, inhalation, Daily  gabapentin, 300 mg, oral, Nightly  hydrALAZINE, 100 mg, oral, TID  ipratropium-albuteroL, 3 mL, nebulization, TID after meals  isosorbide mononitrate ER, 120 mg, oral, Daily  methylPREDNISolone sodium succinate (PF), 40 mg, intravenous, Once  NIFEdipine ER, 90 mg, oral, Daily before breakfast  polyethylene glycol, 17 g, oral, Daily  torsemide, 40 mg, oral, Once per day on Sunday Monday Wednesday Friday  vitamin B complex-vitamin C-folic acid, 1 capsule, oral, Daily         PRN medications  PRN medications: acetaminophen, albuterol, hydrOXYzine HCL, oxygen, oxygen, pantoprazole, traMADol      I/O:   Last BM Date: 11/12/24; Stool Appearance: Unable to assess (11/13/24 0920)    Dietary Orders (From admission, onward)       Start      Ordered    11/11/24 0937  Adult diet Regular, 2-3 grams sodium  Diet effective now        Question Answer Comment   Diet type Regular    Diet type 2-3 grams sodium        11/11/24 0937 11/09/24 0406  May Participate in Room Service  ( ROOM SERVICE MAY PARTICIPATE)  Once        Question:  .  Answer:  Yes    11/09/24 0405                     Estimated Needs:   Total Energy Estimated Needs (kCal):  (2518-5668 kcal)  Method for Estimating Needs: 30-35 kcal/kg EDW  Total Protein Estimated Needs (g):  (60-65 g)  Method for Estimating Needs: 1.2 g/kg EDW  Total Fluid Estimated Needs (mL):  (Per med team)           Nutrition Diagnosis   Malnutrition Diagnosis  Patient has Malnutrition Diagnosis: Yes  Diagnosis Status: New  Malnutrition Diagnosis: Moderate malnutrition related to chronic disease or condition  As Evidenced by: Predicted intake of </= 75% EER for >/= 1 month, mild/moderate muscle/fat wasting.            Nutrition Interventions/Recommendations         Nutrition Prescription:  Individualized Nutrition Prescription Provided for : 7476-3952 kcal, 60-65 g pro.   Continue Na restricted diet.  Recommend new Vit D labs due to malnutrition diagnosis        Nutrition Interventions:   Interventions: Medical food supplement  Medical Food Supplement: Commercial beverage  Goal: Increase nutrition by providing Magic Cup (290 kcal, 9 g pro) BID with lunch and dinner         Nutrition Education:   Discussed adequate nutrition for healing/recovery       Nutrition Monitoring and Evaluation   Food/Nutrient Related History Monitoring  Monitoring and Evaluation Plan: Energy intake, Amount of food  Energy Intake: Estimated energy intake  Criteria: Consume >75% EER  Amount of Food: Estimated amout of food, Medical food intake  Criteria: Consume >75% meals and ONS    Body Composition/Growth/Weight History  Monitoring and Evaluation Plan: Weight change  Weight Change: Weight gain, Weight loss  Criteria: Maintain stable  weight    Biochemical Data, Medical Tests and Procedures  Monitoring and Evaluation Plan: Electrolyte/renal panel, Glucose/endocrine profile, Nutritional anemia profile, Vitamin profile  Criteria: WNL              Time Spent (min): 45 minutes

## 2024-11-13 NOTE — NURSING NOTE
Report from Sending RN:    Report From: PONCHO Fountain  Recent Surgery of Procedure: No  Baseline Level of Consciousness (LOC): A/O  Oxygen Use: Yes  Type: 2L  Diabetic: No  Last BP Med Given Day of Dialysis: see MAR  Last Pain Med Given: n/a  Lab Tests to be Obtained with Dialysis: No  Blood Transfusion to be Given During Dialysis: No  Available IV Access: Yes  Medications to be Administered During Dialysis: yes, BP see MAR  Continuous IV Infusion Running: No  Restraints on Currently or in the Last 24 Hours: No  Hand-Off Communication: Pt stable for tx  Dialysis Catheter Dressing: AVF  Last Dressing Change: N/A

## 2024-11-13 NOTE — CARE PLAN
The patient's goals for the shift include      The clinical goals for the shift include Pt will remain free from falls and/or injury during this shift    Over the shift, the patient did not make progress toward the following goals. Barriers to progression include   Problem: Fall/Injury  Goal: Not fall by end of shift  Outcome: Progressing  Goal: Be free from injury by end of the shift  Outcome: Progressing  Goal: Verbalize understanding of personal risk factors for fall in the hospital  Outcome: Progressing  Goal: Verbalize understanding of risk factor reduction measures to prevent injury from fall in the home  Outcome: Progressing  Goal: Use assistive devices by end of the shift  Outcome: Progressing  Goal: Pace activities to prevent fatigue by end of the shift  Outcome: Progressing     Problem: Pain - Adult  Goal: Verbalizes/displays adequate comfort level or baseline comfort level  Outcome: Progressing     Problem: Safety - Adult  Goal: Free from fall injury  Outcome: Progressing     Problem: Discharge Planning  Goal: Discharge to home or other facility with appropriate resources  Outcome: Progressing     Problem: Chronic Conditions and Co-morbidities  Goal: Patient's chronic conditions and co-morbidity symptoms are monitored and maintained or improved  Outcome: Progressing     Problem: Heart Failure  Goal: Improved gas exchange this shift  Outcome: Progressing  Goal: Improved urinary output this shift  Outcome: Progressing  Goal: Reduction in peripheral edema within 24 hours  Outcome: Progressing  Goal: Report improvement of dyspnea/breathlessness this shift  Outcome: Progressing  Goal: Weight from fluid excess reduced over 2-3 days, then stabilize  Outcome: Progressing  Goal: Increase self care and/or family involvement in 24 hours  Outcome: Progressing     Problem: Diabetes  Goal: Achieve decreasing blood glucose levels by end of shift  Outcome: Progressing  Goal: Increase stability of blood glucose readings  by end of shift  Outcome: Progressing  Goal: Decrease in ketones present in urine by end of shift  Outcome: Progressing  Goal: Maintain electrolyte levels within acceptable range throughout shift  Outcome: Progressing  Goal: Maintain glucose levels >70mg/dl to <250mg/dl throughout shift  Outcome: Progressing  Goal: No changes in neurological exam by end of shift  Outcome: Progressing  Goal: Learn about and adhere to nutrition recommendations by end of shift  Outcome: Progressing  Goal: Vital signs within normal range for age by end of shift  Outcome: Progressing  Goal: Increase self care and/or family involovement by end of shift  Outcome: Progressing  Goal: Receive DSME education by end of shift  Outcome: Progressing

## 2024-11-13 NOTE — PROGRESS NOTES
Occupational Therapy    Evaluation    Patient Name: Stacey Heath  MRN: [id number]  Today's Date: 11/13/2024  Room: 2026/2026-A  Time Calculation  Start Time: 0956  Stop Time: 1023  Time Calculation (min): 27 min    Assessment  IP OT Assessment  OT Assessment: PATIENT IS ABLE TO PERFORM ADL, FXAL TF, AND MOBILITY TASKS AT A MOD I > IND LEVEL AS PTA.  Barriers to Discharge: None  Evaluation/Treatment Tolerance: Patient tolerated treatment well  Medical Staff Made Aware: Yes  End of Session Communication: Bedside nurse, PCT/NA/CTA  End of Session Patient Position: Bed, 3 rail up, Alarm off, not on at start of session  Plan:  Inpatient Plan  No Skilled OT: No acute OT goals identified  OT Discharge Recommendations:  (HOME OT EVALUATION FOR AE/DME/ECT PERFORMANCE OF I/ADL TASKS AT MAX LEVEL OF INDEPENDENCE IN HER POST DISCHARGE SETTING.)  Equipment Recommended upon Discharge:  (TUB BENCH, LH AE, BSC)  OT Recommended Transfer Status:  (MOD I)  OT - OK to Discharge: Yes  OT Assessment  Barriers to Discharge: None  Evaluation/Treatment Tolerance: Patient tolerated treatment well  Medical Staff Made Aware: Yes    Subjective   Current Problem:  1. Congestive heart failure, unspecified HF chronicity, unspecified heart failure type        2. Pneumonia due to infectious organism, unspecified laterality, unspecified part of lung          General:  Reason for Referral: Pt presented to ED with a chief complaint of SOB, found to have SBP >200 and briefly required BiPAP in the ED. Pt was admitted to the CICU for the management of hypertensive emergency with flash pulmonary edema. 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  Prior to Session Communication: Bedside nurse  Patient Position Received: Bed, 3 rail up, Alarm off, not on at start of session  Family/Caregiver Present: No  General Comment: RESTING IN SUPINE  AT START OF SESSION. RECEPTIVE TO FULL PARTICIPATION.   Precautions:  Hearing/Visual Limitations: GLASSES/READERS  Medical Precautions: Fall precautions  Vital Signs:  Vital Signs (Past 2hrs)        Date/Time Vitals Session Patient Position Pulse Resp SpO2 BP MAP (mmHg)    11/13/24 1155 --  --  98  16  100 %  191/83  --                   Pain:  Pain Assessment  Pain Assessment: 0-10  0-10 (Numeric) Pain Score: 6  Pain Location: Abdomen (AND L/SIDE)  Pain Orientation: Left  Effect of Pain on Daily Activities: FULLY PARTICIPATORY  Pain Interventions: Medication (See MAR)  Lines/Tubes/Drains:    Objective   Cognition:  Overall Cognitive Status: Within Functional Limits  Arousal/Alertness: Appropriate responses to stimuli  Orientation Level: Oriented X4  Following Commands: Follows all commands and directions without difficulty  Attention: Within Functional Limits  Memory: Within Funtional Limits  Problem Solving: Within Functional Limits  Safety/Judgement: Within Functional Limits  Insight: Within function limits  Impulsive: Within functional limits  Processing Speed: Within funtional limits  Home Living:  Type of Home: House  Lives With: Significant other (REPORTS FIANCE AND DTR. PRESENT AND AVAILABLE FOR FULL PRN ASSISTANCE)  Home Adaptive Equipment: Cane, Walker rolling or standard  Home Layout: Multi-level, Laundry in basement, Bed/bath upstairs, Stairs to alternate level with rails  Alternate Level Stairs-Rails: Both  Alternate Level Stairs-Number of Steps: ~ 14  Home Access: Stairs to enter with rails  Entrance Stairs-Rails: Both  Entrance Stairs-Number of Steps: 6  Bathroom Shower/Tub: Tub/shower unit  Bathroom Toilet: Standard  Bathroom Equipment: None   Prior Function:  Level of Posey: Independent with ADLs and functional transfers, Independent with homemaking with ambulation  Receives Help From: Family (IADL/LAUNDRY ASSISTANCE ONLY FROM EMILIANO)  ADL Assistance: Independent  Homemaking Assistance:  Independent  Ambulatory Assistance: Independent (MOD I; REPORTS AMB W/O DEVICE IN THE HOME BUT ONLY SHORT DISTANCE TOLERATED USING FURNITURE SUPPORT; MULTIPLE REST BREAKS REQUIRED 2/2 SOB)  Vocational: On disability  Hand Dominance: Left  IADL History:  Homemaking Responsibilities: Yes  Meal Prep Responsibility: Primary  Laundry Responsibility: Secondary  Cleaning Responsibility: Primary  Bill Paying/Finance Responsibility: Primary  Shopping Responsibility: Primary   Responsibility: Secondary  Current License: Yes  Mode of Transportation: Car  Leisure and Hobbies: LISTENING TO MUSIC, ADULT COLORING ON TABLET, [employer], AND TIME WITH GRANDSON  ADL:  Eating Assistance: Independent  Grooming Deficit: Setup  Bathing Assistance: Modified independent (Device)  Bathing Deficit:  (ANTICIPATED WITH INCREASED TIME AND REST BREAKS)  UE Dressing Assistance: Independent  LE Dressing Assistance: Modified independent (Device)  Toileting Assistance with Device: Modified independent  Activity Tolerance:  Endurance: Endurance does not limit participation in activity  Balance:  Static Sitting Balance  Static Sitting-Balance Support: No upper extremity supported, Feet supported  Static Sitting-Level of Assistance: Independent  Bed Mobility/Transfers: Bed Mobility  Bed Mobility: Yes  Bed Mobility 1  Bed Mobility 1: Supine to sitting, Sitting to supine  Level of Assistance 1: Independent  Bed Mobility Comments 1: HOB ELEVATED  Functional Mobility 1  Device 1: No device  Assistance 1:  (MOD I IN BEDROOM)   and Transfers  Transfer: Yes  Transfer 1  Transfer From 1: Sit to, Stand to  Transfer to 1: Stand, Sit  Transfer Device 1:  (NO AD)  Transfers 2  Transfer From 2: Bed to  Transfer to 2: Chair with arms  Transfer Device 2:  (NO AD)  Transfer Level of Assistance 2: Modified independent  IADL's:   Homemaking Responsibilities: Yes  Meal Prep Responsibility: Primary  Laundry Responsibility: Secondary  Cleaning Responsibility:  Primary  Bill Paying/Finance Responsibility: Primary  Shopping Responsibility: Primary   Responsibility: Secondary  Current License: Yes  Mode of Transportation: Car  Leisure and Hobbies: LISTENING TO MUSIC, ADULT COLORING ON TABLET, [employer], AND TIME WITH GRANDSON  Vision: Vision - Basic Assessment  Current Vision: Wears glasses only for reading  Patient Visual Report:  (WFL)   and    Sensation:  Light Touch: No apparent deficits  Sharp/Dull: No apparent deficits  Stereognosis: No apparent deficits  Proprioception: No apparent deficits  Strength:  Strength Comments: BUE >/= 4/5  Perception:  Inattention/Neglect: Appears intact  Initiation: Appears intact  Motor Planning: Appears intact  Perseveration: Not present  Coordination:  Movements are Fluid and Coordinated: Yes   Hand Function:  Hand Function  Gross Grasp: Functional  Coordination: Functional  Extremities: RUE   RUE :  (TOLERATED 90* SHL AROM 2/2 REPORTS OF PREVIOUS SURGERY), LUE   LUE:  (TOLERATED 90* SHL AROM 2/2 REPORTS OF PREVIOUS SURGERY),  , and      Outcome Measures: [facility] Daily Activity  Putting on and taking off regular lower body clothing: None  Bathing (including washing, rinsing, drying): None  Putting on and taking off regular upper body clothing: None  Toileting, which includes using toilet, bedpan or urinal: None  Taking care of personal grooming such as brushing teeth: A little  Eating Meals: None  Daily Activity - Total Score: 23  Brief Confusion Assessment Method (bCAM)  CAM Result: CAM -      ,     OT Adult Other Outcome Measures  4AT: 0            11/13/24 at 12:18 PM   Mamie Islas OT   Rehab Office: [phone]

## 2024-11-13 NOTE — PROGRESS NOTES
INTERNAL MEDICINE PROGRESS NOTE     BRIEF NARRATIVE      Stacey Heath is a 54 y.o. female on day 4 of admission presenting with Congestive heart failure, unspecified HF chronicity, unspecified heart failure type.    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 She was admitted to the CICU for the management of hypertensive emergency with flash pulmonary edema. Suspect 2/2 resistant hypertension vs medication noncompliance. Was treated with Nicardipine gtt and resumed some home BP meds. SBP dropped to 130s so Nicardipine was held. Elevated again to 180s-190s, resumed nifedipine 11/9 . She underwent HD 11/9     Medicine course:   The patient was transferred to medicine floor on 11/11.  BP remained stable. Discussed with nephrology regarding the etiology of resistant hypertension, Dr. Allison who recommended a few more days of hospitalization for daily dialysis. CTA a/p to assess renal artery pending       ASSESSMENT / PLANS      > HTN emergency with flash pulmonary edema   > HFpEF  > Tropenemia, 2/2 TII MI in setting of marked HTN and ESRD   :: home meds: Coreg 50 mg BID, Clonidine 0.2 mg/24 hr patch- 1 patch on skin every 7 days, Hydralazine 100 mg TID , Imdur 120 mg every day , Nifedipine 90 mg every day , Valsartan 320 mg every day   :: TTE 04/2024- LVEF 60-65% with pseudonormal pattern of left ventricular diastolic filling.   :: Renal US 2023 --> Doppler evaluation compatible with history of chronic  renal failure on dialysis.   :: Seen by sleep medicine 11/2024 who felt she likely had sleep apnea based on h/p- planning for outpatient sleep study   :: given imdur and hydral in ED   - Weaned off Nicardipine gtt  - Continue with Imdur every day, and coreg 50 BID  - Resumed nifedipine 90 on  11/10   - Can resume other BP meds as above as needed   - Getting HD daily until 11/15   - As per her HFpEF not currently on MRA given renal function, could consider SGLT2i- has not been initiated likely 2/2 ESRD on iHD   - Consider optimizing home BP reg, would favor initiation of Entresto   - serum drug screen negative   - Will obtain CTA renal artery to assess stenosis as possible etiology of resistant HTN. Pt is allergic to contrast and will need pretreatment      > AHRF - resolved   :: s/p IV lasix 40 mg in the ED   :: Home med Torsemide 40 mg every day on non HD days  :: Bedside POCUS with IVC collapsible    :: likely 2/2 flash pulm edema, likely 2/2 fluid redistribution as opposed to fluid overload. Clinical picture not endorsing infection (no fevers/leukocytosis)    - Control BP as above  - Nephro consulted for HD today   - Wean O2 as able     > ESRD on iHD T/TH/SAT  - c/w home Nephrocaps , Phoslo   - holding home Torsemide 40 mg every day on non HD days   - Nephro rec daily iHD and IUF for 4 days to aid with HTN and fluid overload     > HLD   - c/w home statin, aspirin      > Peripheral neuropathy   - c/w home gabapentin      > COPD without acute exacerbation   - continue home Breo Ellipta 100-25 1 puff daily   - Duoneb q4H scheduled, albuterol q2H PRN     > GERD  - c/w home PPI      > Normocytic anemia, likely 2/2 renal disease  - c/w  Epoetin joceline 85706 units three times a week      > Hx of LUE DVT  :: per chart review appears that brachial vein DVT in setting of PICC --> provoked   - c/w eliquis for now, suspect can be discontinued as > 6 month duration of therapy has now been completed. Will defer to floor team for  final decision      DVT ppx: Eliquis for now   GI ppx: home PPI   Code Status: full code (confirmed on admission)  Surrogate Medical Decision-maker:    Hai Pinedo ([facility]) [phone]      SUBJECTIVE       PT seen and examined today, BP better controlled     OBJECTIVE      Visit Vitals  /72 (BP Location: Left arm, Patient Position: Lying)   Pulse 78   Temp 36.5 °C (97.7 °F) (Temporal)   Resp 16        Intake/Output Summary (Last 24 hours) at 11/13/2024 0821  Last data filed at 11/12/2024 1835  Gross per 24 hour   Intake 1480 ml   Output 1400 ml   Net 80 ml       Physical Exam   Constitutional:       Appearance: Normal appearance.   HENT:      Head: Normocephalic and atraumatic.      Mouth/Throat:      Pharynx: Oropharynx is clear.   Cardiovascular:      Rate and Rhythm: Normal rate and regular rhythm.   Pulmonary:      Effort: Pulmonary effort is normal.      Breath sounds: Normal breath sounds.   Abdominal:      General: Abdomen is flat. Bowel sounds are normal.      Palpations: Abdomen is soft.   Musculoskeletal:         General: Normal range of motion.      Right lower leg: No edema.      Left lower leg: No edema.   Skin:     General: Skin is warm and dry.   Neurological:      General: No focal deficit present.      Mental Status: She is alert and oriented to person, place, and time.     Current Meds   apixaban, 5 mg, oral, BID  aspirin, 81 mg, oral, Daily  atorvastatin, 80 mg, oral, Nightly  calcium acetate, 1,334 mg, oral, TID  carvedilol, 50 mg, oral, BID  diphenhydrAMINE, 50 mg, intravenous, Once  epoetin joceline-epbx, 10,000 Units, intravenous, Once per day on Monday Wednesday Friday  fluticasone, 2 spray, Each Nostril, Daily  fluticasone furoate-vilanteroL, 1 puff, inhalation, Daily  gabapentin, 300 mg, oral, Nightly  hydrALAZINE, 100 mg, oral, TID  ipratropium-albuteroL, 3 mL, nebulization, TID after meals  isosorbide mononitrate ER, 120 mg, oral, Daily  methylPREDNISolone sodium succinate (PF), 40  mg, intravenous, Once  NIFEdipine ER, 90 mg, oral, Daily before breakfast  polyethylene glycol, 17 g, oral, Daily  torsemide, 40 mg, oral, Once per day on Sunday Monday Wednesday Friday  vitamin B complex-vitamin C-folic acid, 1 capsule, oral, Daily       PRN medications: acetaminophen, albuterol, hydrOXYzine HCL, oxygen, oxygen, pantoprazole, traMADol     LABS and IMAGING     WBC   Date Value Ref Range Status   11/13/2024 6.1 4.4 - 11.3 x10*3/uL Final   11/12/2024 7.3 4.4 - 11.3 x10*3/uL Final   11/11/2024 6.4 4.4 - 11.3 x10*3/uL Final     Hemoglobin   Date Value Ref Range Status   11/13/2024 8.2 (L) 12.0 - 16.0 g/dL Final   11/12/2024 8.8 (L) 12.0 - 16.0 g/dL Final   11/11/2024 8.1 (L) 12.0 - 16.0 g/dL Final     Hematocrit   Date Value Ref Range Status   11/13/2024 26.7 (L) 36.0 - 46.0 % Final   11/12/2024 28.9 (L) 36.0 - 46.0 % Final   11/11/2024 25.3 (L) 36.0 - 46.0 % Final     Bicarbonate   Date Value Ref Range Status   11/12/2024 25 21 - 32 mmol/L Final   11/11/2024 27 21 - 32 mmol/L Final     Creatinine   Date Value Ref Range Status   11/12/2024 11.16 (H) 0.50 - 1.05 mg/dL Final   11/11/2024 8.23 (H) 0.50 - 1.05 mg/dL Final     Calcium   Date Value Ref Range Status   11/12/2024 9.9 8.6 - 10.6 mg/dL Final   11/11/2024 9.7 8.6 - 10.6 mg/dL Final       Electrocardiogram, 12-lead PRN ACS symptoms  Normal sinus rhythm  ST & T wave abnormality, consider lateral ischemia  Abnormal ECG  When compared with ECG of 08-NOV-2024 23:49,  No significant change was found         PROBLEM LISTS      Problem List Items Addressed This Visit          Cardiac and Vasculature    * (Principal) Congestive heart failure, unspecified HF chronicity, unspecified heart failure type - Primary    Relevant Medications    carvedilol (Coreg) tablet 50 mg    isosorbide mononitrate ER (Imdur) 24 hr tablet 120 mg    NIFEdipine ER (Adalat CC) 24 hr tablet 90 mg     Other Visit Diagnoses       Pneumonia due to infectious organism, unspecified  laterality, unspecified part of lung                This dictation was created with voice recognition software. Although every effort is made to review the dictation as it is transcribed, on occasion spoken words, phrases, names, numbers, punctuation etc can be misinterpreted by the the technology leading to omissions or inappropriate outcomes.     Chelsey Robbins MD

## 2024-11-13 NOTE — PROCEDURES
Seen on IUF  No complaints  Tolerating IUF well per submitted orders   Next HD on 11/14/24 11/12/2024     8:23 PM 11/13/2024     5:09 AM 11/13/2024     9:06 AM 11/13/2024     9:56 AM 11/13/2024    11:55 AM 11/13/2024     4:00 PM 11/13/2024     5:01 PM   Vitals   Systolic 155 159 175 196 191  223   Diastolic 68 72 76 83 83  106   Heart Rate 83 78 85 96 98 108 103   Temp 36.7 °C (98.1 °F) 36.5 °C (97.7 °F)   36.1 °C (97 °F) 35.6 °C (96.1 °F)    Resp 16 16   16       No distress  HEENT:  moist, no pallor  No edema dar LE  Breath sounds dar equal, clear  S1 S2 regular, normal, no rub or murmur  Abdomen soft  AAO x3, non focal  Results from last 72 hours   Lab Units 11/13/24  0703 11/12/24  0802 11/11/24  0414   SODIUM mmol/L 137 139 139   POTASSIUM mmol/L 4.9 5.6* 4.9   CHLORIDE mmol/L 97* 99 99   CO2 mmol/L 29 25 27   BUN mg/dL 36* 62* 47*   PHOSPHORUS mg/dL 5.1* 6.4* 6.4*   HEMOGLOBIN g/dL 8.2* 8.8* 8.1*     apixaban, 5 mg, oral, BID  aspirin, 81 mg, oral, Daily  atorvastatin, 80 mg, oral, Nightly  calcium acetate, 1,334 mg, oral, TID  carvedilol, 50 mg, oral, BID  epoetin joceline-epbx, 10,000 Units, intravenous, Once per day on Monday Wednesday Friday  fluticasone, 2 spray, Each Nostril, Daily  fluticasone furoate-vilanteroL, 1 puff, inhalation, Daily  gabapentin, 300 mg, oral, Nightly  hydrALAZINE, 100 mg, oral, TID  ipratropium-albuteroL, 3 mL, nebulization, TID after meals  isosorbide mononitrate ER, 120 mg, oral, Daily  methylPREDNISolone sodium succinate (PF), 40 mg, intravenous, Once  NIFEdipine ER, 90 mg, oral, Daily before breakfast  polyethylene glycol, 17 g, oral, Daily  torsemide, 40 mg, oral, Once per day on Sunday Monday Wednesday Friday  vitamin B complex-vitamin C-folic acid, 1 capsule, oral, Daily

## 2024-11-13 NOTE — PROGRESS NOTES
Stacey Heath  Age: 54 y.o.  MRN: [id number]  Date: 11/13/2024  Location of service: in community    Program Details  Medicaid Community Clinical Case Management  Status: Enrolled  Effective Dates: 10/17/2023 - present  Responsible Staff: NAREN Davidson      Goals Reviewed:  Problem: Anxiety       Goal: Attempts to manage anxiety with help       Priority: High         Goal: Verbalizes ways to manage anxiety       Priority: High         Goal: Implements measures to reduce anxiety       Priority: High        Problem: Financial Stressors       Goal: Assistance with financial concerns               Summary:  This provider met with patient at [facility] where patient has been inpatient for the last several days. This provider listened with empathy as patient talked about why she was admitted this time. Patient reports that she was taken down for medical testing to see the heart. This provider and patient spoke about setting boundaries with family members that is causing the patient some anxiety. Patient also reports that she is still waiting on paperwork from [employer] to get her SNAP benefits reinstated.                      NAREN Davidson

## 2024-11-14 ENCOUNTER — APPOINTMENT (OUTPATIENT)
Dept: DIALYSIS | Facility: HOSPITAL | Age: 54
End: 2024-11-14
Payer: COMMERCIAL

## 2024-11-14 ENCOUNTER — PATIENT OUTREACH (OUTPATIENT)
Dept: CARE COORDINATION | Facility: CLINIC | Age: 54
End: 2024-11-14
Payer: COMMERCIAL

## 2024-11-14 ENCOUNTER — HOME HEALTH ADMISSION (OUTPATIENT)
Dept: HOME HEALTH SERVICES | Facility: HOME HEALTH | Age: 54
End: 2024-11-14
Payer: COMMERCIAL

## 2024-11-14 LAB
ALBUMIN SERPL BCP-MCNC: 4 G/DL (ref 3.4–5)
ANION GAP SERPL CALC-SCNC: 20 MMOL/L (ref 10–20)
ATRIAL RATE: 80 BPM
BASOPHILS # BLD AUTO: 0.04 X10*3/UL (ref 0–0.1)
BASOPHILS NFR BLD AUTO: 0.4 %
BUN SERPL-MCNC: 71 MG/DL (ref 6–23)
CALCIUM SERPL-MCNC: 9.7 MG/DL (ref 8.6–10.6)
CHLORIDE SERPL-SCNC: 97 MMOL/L (ref 98–107)
CO2 SERPL-SCNC: 24 MMOL/L (ref 21–32)
CREAT SERPL-MCNC: 10.08 MG/DL (ref 0.5–1.05)
EGFRCR SERPLBLD CKD-EPI 2021: 4 ML/MIN/1.73M*2
EOSINOPHIL # BLD AUTO: 0.02 X10*3/UL (ref 0–0.7)
EOSINOPHIL NFR BLD AUTO: 0.2 %
ERYTHROCYTE [DISTWIDTH] IN BLOOD BY AUTOMATED COUNT: 18.6 % (ref 11.5–14.5)
GLUCOSE SERPL-MCNC: 172 MG/DL (ref 74–99)
HCT VFR BLD AUTO: 28.7 % (ref 36–46)
HGB BLD-MCNC: 8.5 G/DL (ref 12–16)
IMM GRANULOCYTES # BLD AUTO: 0.13 X10*3/UL (ref 0–0.7)
IMM GRANULOCYTES NFR BLD AUTO: 1.5 % (ref 0–0.9)
LYMPHOCYTES # BLD AUTO: 0.96 X10*3/UL (ref 1.2–4.8)
LYMPHOCYTES NFR BLD AUTO: 10.7 %
MAGNESIUM SERPL-MCNC: 2.79 MG/DL (ref 1.6–2.4)
MCH RBC QN AUTO: 29.4 PG (ref 26–34)
MCHC RBC AUTO-ENTMCNC: 29.6 G/DL (ref 32–36)
MCV RBC AUTO: 99 FL (ref 80–100)
MONOCYTES # BLD AUTO: 0.67 X10*3/UL (ref 0.1–1)
MONOCYTES NFR BLD AUTO: 7.5 %
NEUTROPHILS # BLD AUTO: 7.14 X10*3/UL (ref 1.2–7.7)
NEUTROPHILS NFR BLD AUTO: 79.7 %
NRBC BLD-RTO: 1 /100 WBCS (ref 0–0)
P AXIS: 41 DEGREES
P OFFSET: 188 MS
P ONSET: 140 MS
PHOSPHATE SERPL-MCNC: 4.9 MG/DL (ref 2.5–4.9)
PLATELET # BLD AUTO: 233 X10*3/UL (ref 150–450)
POTASSIUM SERPL-SCNC: 4.9 MMOL/L (ref 3.5–5.3)
PR INTERVAL: 156 MS
Q ONSET: 218 MS
QRS COUNT: 13 BEATS
QRS DURATION: 86 MS
QT INTERVAL: 386 MS
QTC CALCULATION(BAZETT): 445 MS
QTC FREDERICIA: 425 MS
R AXIS: 15 DEGREES
RBC # BLD AUTO: 2.89 X10*6/UL (ref 4–5.2)
SODIUM SERPL-SCNC: 136 MMOL/L (ref 136–145)
T AXIS: 141 DEGREES
T OFFSET: 411 MS
VENTRICULAR RATE: 80 BPM
WBC # BLD AUTO: 9 X10*3/UL (ref 4.4–11.3)

## 2024-11-14 PROCEDURE — 80069 RENAL FUNCTION PANEL: CPT

## 2024-11-14 PROCEDURE — 2500000001 HC RX 250 WO HCPCS SELF ADMINISTERED DRUGS (ALT 637 FOR MEDICARE OP): Performed by: STUDENT IN AN ORGANIZED HEALTH CARE EDUCATION/TRAINING PROGRAM

## 2024-11-14 PROCEDURE — 94660 CPAP INITIATION&MGMT: CPT

## 2024-11-14 PROCEDURE — 2500000005 HC RX 250 GENERAL PHARMACY W/O HCPCS: Performed by: STUDENT IN AN ORGANIZED HEALTH CARE EDUCATION/TRAINING PROGRAM

## 2024-11-14 PROCEDURE — 2500000002 HC RX 250 W HCPCS SELF ADMINISTERED DRUGS (ALT 637 FOR MEDICARE OP, ALT 636 FOR OP/ED): Performed by: STUDENT IN AN ORGANIZED HEALTH CARE EDUCATION/TRAINING PROGRAM

## 2024-11-14 PROCEDURE — 94640 AIRWAY INHALATION TREATMENT: CPT

## 2024-11-14 PROCEDURE — 36415 COLL VENOUS BLD VENIPUNCTURE: CPT

## 2024-11-14 PROCEDURE — 85025 COMPLETE CBC W/AUTO DIFF WBC: CPT

## 2024-11-14 PROCEDURE — 83735 ASSAY OF MAGNESIUM: CPT

## 2024-11-14 PROCEDURE — 1100000001 HC PRIVATE ROOM DAILY

## 2024-11-14 PROCEDURE — 99233 SBSQ HOSP IP/OBS HIGH 50: CPT | Performed by: STUDENT IN AN ORGANIZED HEALTH CARE EDUCATION/TRAINING PROGRAM

## 2024-11-14 RX ORDER — DIPHENHYDRAMINE HCL 25 MG
50 CAPSULE ORAL ONCE
Status: CANCELLED | OUTPATIENT
Start: 2024-11-15 | End: 2024-11-15

## 2024-11-14 ASSESSMENT — PAIN SCALES - GENERAL
PAINLEVEL_OUTOF10: 7
PAINLEVEL_OUTOF10: 4
PAINLEVEL_OUTOF10: 0 - NO PAIN

## 2024-11-14 ASSESSMENT — PAIN - FUNCTIONAL ASSESSMENT
PAIN_FUNCTIONAL_ASSESSMENT: WONG-BAKER FACES
PAIN_FUNCTIONAL_ASSESSMENT: WONG-BAKER FACES
PAIN_FUNCTIONAL_ASSESSMENT: 0-10

## 2024-11-14 ASSESSMENT — PAIN DESCRIPTION - DESCRIPTORS
DESCRIPTORS: THROBBING
DESCRIPTORS: ACHING

## 2024-11-14 ASSESSMENT — PAIN DESCRIPTION - ORIENTATION: ORIENTATION: ANTERIOR

## 2024-11-14 ASSESSMENT — PAIN SCALES - WONG BAKER
WONGBAKER_NUMERICALRESPONSE: NO HURT
WONGBAKER_NUMERICALRESPONSE: NO HURT

## 2024-11-14 ASSESSMENT — PAIN DESCRIPTION - LOCATION: LOCATION: HEAD

## 2024-11-14 NOTE — NURSING NOTE
Report to Receiving RN:    Report To: PONCHO Fountain  Time Report Called: 1900  Hand-Off Communication: Pt completed 2.5 hrs HD, 2L fluid removed, tolerated tx, BP still increased, DrEdison aware, pt stable, A/O.   Complications During Treatment: No  Ultrafiltration Treatment: Yes  Medications Administered During Dialysis: Yes  Blood Products Administered During Dialysis: No  Labs Sent During Dialysis: No  Heparin Drip Rate Changes: No  Dialysis Catheter Dressing: N/A, AVF  Last Dressing Change: N/A      Last Updated: 7:00 PM by SAKINA MOELLER

## 2024-11-14 NOTE — PROGRESS NOTES
INTERNAL MEDICINE PROGRESS NOTE     BRIEF NARRATIVE      Stacey Heath is a 54 y.o. female on day 5 of admission presenting with Congestive heart failure, unspecified HF chronicity, unspecified heart failure type.    CICU course:   Pt with PMH significant for ESRD 2/2 on dialysis T/Th/Sat via RUE AVF (last complete session 11/07/2024), HTN, HFpEF, COPD, brachial DVT on Eliquis who presented for [facility] ED with a chief complaint of SOB, found to have SBP >200 and briefly required BiPAP in the ED. She was admitted to the CICU for the management of hypertensive emergency with flash pulmonary edema. Suspect 2/2 resistant hypertension vs medication noncompliance. Was treated with Nicardipine gtt and resumed some home BP meds. SBP dropped to 130s so Nicardipine was held. Elevated again to 180s-190s, resumed nifedipine 11/9 . She underwent HD 11/9     Medicine course:   Discussed with nephrology regarding the etiology of resistant hypertension, Dr. Allison who recommended a few more days of hospitalization for daily dialysis.   CTA a/p to assess renal artery pending   Can be discharged Friday after dialysis          ASSESSMENT / PLANS      > HTN emergency with flash pulmonary edema   > HFpEF  > Tropenemia, 2/2 TII MI in setting of marked HTN and ESRD   :: home meds: Coreg 50 mg BID, Clonidine 0.2 mg/24 hr patch- 1 patch on skin every 7 days, Hydralazine 100 mg TID , Imdur 120 mg every day , Nifedipine 90 mg every day , Valsartan 320 mg every day   :: TTE 04/2024- LVEF 60-65% with pseudonormal pattern of left ventricular diastolic filling.   :: Renal US 2023 --> Doppler evaluation compatible with history of chronic renal failure on dialysis.    :: Seen by sleep medicine 11/2024 who felt she likely had sleep apnea based on h/p- planning for outpatient sleep study   :: given imdur and hydral in ED   - Weaned off Nicardipine gtt  - Continue with Imdur every day, and coreg 50 BID  - Resumed nifedipine 90 on  11/10   - Can resume other BP meds as above as needed   - Getting HD daily until 11/15   - As per her HFpEF not currently on MRA given renal function, could consider SGLT2i- has not been initiated likely 2/2 ESRD on iHD   - Consider optimizing home BP reg, would favor initiation of Entresto   - serum drug screen negative   - Pending CTA renal artery to assess stenosis as possible etiology of resistant HTN. Pt is allergic to contrast and will need pretreatment        AHRF - resolved    :: Home med Torsemide 40 mg every day on non HD days   :: likely 2/2 flash pulm edema, likely 2/2 fluid redistribution as opposed to fluid overload. Clinical picture not endorsing infection (no fevers/leukocytosis)    - Control BP as above  - Nephro consulted for HD     - Wean O2 as able       ESRD on iHD T/TH/SAT  - c/w home Nephrocaps , Phoslo   - holding home Torsemide 40 mg every day on non HD days   - Nephro rec daily iHD and IUF for 4 days to aid with HTN and fluid overload     HLD   - c/w home statin, aspirin      Peripheral neuropathy   - c/w home gabapentin      COPD without acute exacerbation   - continue home Breo Ellipta 100-25 1 puff daily   - Duoneb q4H scheduled, albuterol q2H PRN     GERD  - c/w home PPI      Normocytic anemia, likely 2/2 renal disease  - c/w  Epoetin joceline 15197 units three times a week      Hx of LUE DVT  :: per chart review appears that brachial vein DVT in setting of PICC --> provoked   - c/w eliquis for now, suspect can be discontinued as > 6 month duration of therapy has now been completed. Will defer to floor team for final decision      DVT ppx: Eliquis for now   GI ppx: home PPI   Code Status: full code (confirmed on  admission)  Surrogate Medical Decision-maker:    Hai Pinedo ([facility]) [phone]        50 min      SUBJECTIVE       PT seen and examined today     OBJECTIVE      Visit Vitals  /82 (BP Location: Left arm, Patient Position: Lying)   Pulse 86   Temp 36.5 °C (97.7 °F) (Temporal)   Resp 12        Intake/Output Summary (Last 24 hours) at 11/14/2024 1620  Last data filed at 11/14/2024 1220  Gross per 24 hour   Intake 1080 ml   Output 2000 ml   Net -920 ml       Physical Exam   Constitutional:       Appearance: Normal appearance.   HENT:      Head: Normocephalic and atraumatic.      Mouth/Throat:      Pharynx: Oropharynx is clear.   Cardiovascular:      Rate and Rhythm: Normal rate and regular rhythm.   Pulmonary:      Effort: Pulmonary effort is normal.      Breath sounds: Normal breath sounds.   Abdominal:      General: Abdomen is flat. Bowel sounds are normal.      Palpations: Abdomen is soft.   Musculoskeletal:         General: Normal range of motion.      Right lower leg: No edema.      Left lower leg: No edema.   Skin:     General: Skin is warm and dry.   Neurological:      General: No focal deficit present.      Mental Status: She is alert and oriented to person, place, and time.     Current Meds   apixaban, 5 mg, oral, BID  aspirin, 81 mg, oral, Daily  atorvastatin, 80 mg, oral, Nightly  calcium acetate, 1,334 mg, oral, TID  carvedilol, 50 mg, oral, BID  epoetin joceline-epbx, 10,000 Units, intravenous, Once per day on Monday Wednesday Friday  fluticasone, 2 spray, Each Nostril, Daily  fluticasone furoate-vilanteroL, 1 puff, inhalation, Daily  gabapentin, 300 mg, oral, Nightly  hydrALAZINE, 100 mg, oral, TID  ipratropium-albuteroL, 3 mL, nebulization, TID after meals  isosorbide mononitrate ER, 120 mg, oral, Daily  methylPREDNISolone sodium succinate (PF), 40 mg, intravenous, Once  NIFEdipine ER, 90 mg, oral, Daily before breakfast  polyethylene glycol, 17 g, oral, Daily  torsemide, 40 mg, oral, Once per day  on Sunday Monday Wednesday Friday  vitamin B complex-vitamin C-folic acid, 1 capsule, oral, Daily       PRN medications: acetaminophen, albuterol, hydrOXYzine HCL, oxygen, oxygen, pantoprazole, traMADol     LABS and IMAGING     WBC   Date Value Ref Range Status   11/14/2024 9.0 4.4 - 11.3 x10*3/uL Final   11/13/2024 6.1 4.4 - 11.3 x10*3/uL Final   11/12/2024 7.3 4.4 - 11.3 x10*3/uL Final     Hemoglobin   Date Value Ref Range Status   11/14/2024 8.5 (L) 12.0 - 16.0 g/dL Final   11/13/2024 8.2 (L) 12.0 - 16.0 g/dL Final   11/12/2024 8.8 (L) 12.0 - 16.0 g/dL Final     Hematocrit   Date Value Ref Range Status   11/14/2024 28.7 (L) 36.0 - 46.0 % Final   11/13/2024 26.7 (L) 36.0 - 46.0 % Final   11/12/2024 28.9 (L) 36.0 - 46.0 % Final     Bicarbonate   Date Value Ref Range Status   11/13/2024 29 21 - 32 mmol/L Final   11/12/2024 25 21 - 32 mmol/L Final     Creatinine   Date Value Ref Range Status   11/13/2024 6.97 (H) 0.50 - 1.05 mg/dL Final   11/12/2024 11.16 (H) 0.50 - 1.05 mg/dL Final     Calcium   Date Value Ref Range Status   11/13/2024 9.5 8.6 - 10.6 mg/dL Final   11/12/2024 9.9 8.6 - 10.6 mg/dL Final       Electrocardiogram, 12-lead PRN ACS symptoms  Normal sinus rhythm  ST & T wave abnormality, consider lateral ischemia  Abnormal ECG  When compared with ECG of 08-NOV-2024 23:49,  No significant change was found  Confirmed by Casper Conklin ([id number]) on 11/14/2024 10:34:47 AM         PROBLEM LISTS      Problem List Items Addressed This Visit       Polyneuropathy due to type 2 diabetes mellitus (Multi)    Relevant Orders    Referral to Home Care    Low back pain    Relevant Orders    Referral to Home Care    Resistant hypertension    Relevant Orders    Referral to Home Care    ESRD on hemodialysis (Multi)    Relevant Orders    Referral to Home Care    Sleep-related breathing disorder    Relevant Orders    Referral to Home Care    * (Principal) Congestive heart failure, unspecified HF chronicity, unspecified heart  failure type - Primary    Relevant Medications    carvedilol (Coreg) tablet 50 mg    isosorbide mononitrate ER (Imdur) 24 hr tablet 120 mg    NIFEdipine ER (Adalat CC) 24 hr tablet 90 mg     Other Visit Diagnoses       Pneumonia due to infectious organism, unspecified laterality, unspecified part of lung                This dictation was created with voice recognition software. Although every effort is made to review the dictation as it is transcribed, on occasion spoken words, phrases, names, numbers, punctuation etc can be misinterpreted by the the technology leading to omissions or inappropriate outcomes.     oSniya Ruff MD

## 2024-11-14 NOTE — PROGRESS NOTES
TCC met with pt to discuss care team reccs for LOW intensity and pt agreeable. AOC is [facility] PT/OT/HHA. TCC will make attending aware.   Transitional Care Coordination Progress Note:  Patient discussed during interdisciplinary rounds.   Team members present: MD AGUSTIN  Plan per medical/surgical team:   Payer: [employer]  Status: inpatient  Discharge disposition: [facility] PT/OT/HHA  Potential Barriers: none   ADOD: 11/15  8075-TCC updated [facility] on ADOD. Will continue to follow.

## 2024-11-14 NOTE — NURSING NOTE
Report from Sending RN:    Report From: genie  Recent Surgery of Procedure: No  Baseline Level of Consciousness (LOC): a/o x 3  Oxygen Use: Yes  Type: 2L NC  Diabetic: No  Last BP Med Given Day of Dialysis: none  Last Pain Med Given: none  Lab Tests to be Obtained with Dialysis: Yes  Blood Transfusion to be Given During Dialysis: No  Available IV Access: Yes  Medications to be Administered During Dialysis: No  Continuous IV Infusion Running: No  Restraints on Currently or in the Last 24 Hours: No  Hand-Off Communication: full code, vss, no over night issues, pt can come to the dialysis unit for treatment, pt presented with high bp and floor rn was unaware- will follow up with md  Dialysis Catheter Dressing: avf  Last Dressing Change: will assess

## 2024-11-14 NOTE — NURSING NOTE
Report from Sending RN:    Report From: PONCHO White  Recent Surgery of Procedure: No  Baseline Level of Consciousness (LOC): A/O  Oxygen Use: yes  Type: 2L ox  Diabetic: No  Last BP Med Given Day of Dialysis: hydralazine  Last Pain Med Given: tramadol, atarax  Lab Tests to be Obtained with Dialysis: No  Blood Transfusion to be Given During Dialysis: No  Available IV Access: Yes  Medications to be Administered During Dialysis: No  Continuous IV Infusion Running: No  Restraints on Currently or in the Last 24 Hours: No  Hand-Off Communication: Pt stable for tx. BP med given.  Dialysis Catheter Dressing: AVF  Last Dressing Change:

## 2024-11-14 NOTE — CARE PLAN
The patient's goals for the shift include      The clinical goals for the shift include Patient BP will remain HDS

## 2024-11-14 NOTE — PROGRESS NOTES
Spoke to pt while she was in hosp today. Was able to schedule next fuv with this RD for 12/4 at noon

## 2024-11-14 NOTE — CARE PLAN
Problem: Fall/Injury  Goal: Not fall by end of shift  Outcome: Progressing  Goal: Be free from injury by end of the shift  Outcome: Progressing  Goal: Verbalize understanding of personal risk factors for fall in the hospital  Outcome: Progressing  Goal: Verbalize understanding of risk factor reduction measures to prevent injury from fall in the home  Outcome: Progressing  Goal: Use assistive devices by end of the shift  Outcome: Progressing  Goal: Pace activities to prevent fatigue by end of the shift  Outcome: Progressing   The patient's goals for the shift include      The clinical goals for the shift include Pt will be free from falls/injuries during this shift

## 2024-11-15 ENCOUNTER — APPOINTMENT (OUTPATIENT)
Dept: DIALYSIS | Facility: HOSPITAL | Age: 54
End: 2024-11-15
Payer: COMMERCIAL

## 2024-11-15 LAB
ALBUMIN SERPL BCP-MCNC: 4.2 G/DL (ref 3.4–5)
ANION GAP SERPL CALC-SCNC: 22 MMOL/L (ref 10–20)
BASOPHILS # BLD AUTO: 0.09 X10*3/UL (ref 0–0.1)
BASOPHILS NFR BLD AUTO: 1.3 %
BUN SERPL-MCNC: 34 MG/DL (ref 6–23)
CALCIUM SERPL-MCNC: 10.4 MG/DL (ref 8.6–10.6)
CHLORIDE SERPL-SCNC: 96 MMOL/L (ref 98–107)
CO2 SERPL-SCNC: 26 MMOL/L (ref 21–32)
CREAT SERPL-MCNC: 5.45 MG/DL (ref 0.5–1.05)
EGFRCR SERPLBLD CKD-EPI 2021: 9 ML/MIN/1.73M*2
EOSINOPHIL # BLD AUTO: 0.35 X10*3/UL (ref 0–0.7)
EOSINOPHIL NFR BLD AUTO: 5.1 %
ERYTHROCYTE [DISTWIDTH] IN BLOOD BY AUTOMATED COUNT: 19 % (ref 11.5–14.5)
GLUCOSE SERPL-MCNC: 102 MG/DL (ref 74–99)
HCT VFR BLD AUTO: 35.2 % (ref 36–46)
HGB BLD-MCNC: 9.9 G/DL (ref 12–16)
IMM GRANULOCYTES # BLD AUTO: 0.08 X10*3/UL (ref 0–0.7)
IMM GRANULOCYTES NFR BLD AUTO: 1.2 % (ref 0–0.9)
LYMPHOCYTES # BLD AUTO: 0.94 X10*3/UL (ref 1.2–4.8)
LYMPHOCYTES NFR BLD AUTO: 13.7 %
MAGNESIUM SERPL-MCNC: 2.7 MG/DL (ref 1.6–2.4)
MCH RBC QN AUTO: 29 PG (ref 26–34)
MCHC RBC AUTO-ENTMCNC: 28.1 G/DL (ref 32–36)
MCV RBC AUTO: 103 FL (ref 80–100)
MONOCYTES # BLD AUTO: 0.48 X10*3/UL (ref 0.1–1)
MONOCYTES NFR BLD AUTO: 7 %
NEUTROPHILS # BLD AUTO: 4.92 X10*3/UL (ref 1.2–7.7)
NEUTROPHILS NFR BLD AUTO: 71.7 %
NRBC BLD-RTO: 1.2 /100 WBCS (ref 0–0)
PHOSPHATE SERPL-MCNC: 4.9 MG/DL (ref 2.5–4.9)
PLATELET # BLD AUTO: 240 X10*3/UL (ref 150–450)
POTASSIUM SERPL-SCNC: 5.6 MMOL/L (ref 3.5–5.3)
RBC # BLD AUTO: 3.41 X10*6/UL (ref 4–5.2)
SODIUM SERPL-SCNC: 138 MMOL/L (ref 136–145)
WBC # BLD AUTO: 6.9 X10*3/UL (ref 4.4–11.3)

## 2024-11-15 PROCEDURE — 83735 ASSAY OF MAGNESIUM: CPT

## 2024-11-15 PROCEDURE — 6350000001 HC RX 635 EPOETIN >10,000 UNITS: Mod: JZ | Performed by: NURSE PRACTITIONER

## 2024-11-15 PROCEDURE — 2500000002 HC RX 250 W HCPCS SELF ADMINISTERED DRUGS (ALT 637 FOR MEDICARE OP, ALT 636 FOR OP/ED): Performed by: STUDENT IN AN ORGANIZED HEALTH CARE EDUCATION/TRAINING PROGRAM

## 2024-11-15 PROCEDURE — 94640 AIRWAY INHALATION TREATMENT: CPT

## 2024-11-15 PROCEDURE — 2500000001 HC RX 250 WO HCPCS SELF ADMINISTERED DRUGS (ALT 637 FOR MEDICARE OP): Performed by: STUDENT IN AN ORGANIZED HEALTH CARE EDUCATION/TRAINING PROGRAM

## 2024-11-15 PROCEDURE — 99222 1ST HOSP IP/OBS MODERATE 55: CPT | Performed by: NURSE PRACTITIONER

## 2024-11-15 PROCEDURE — 8010000001 HC DIALYSIS - HEMODIALYSIS PER DAY

## 2024-11-15 PROCEDURE — 2500000004 HC RX 250 GENERAL PHARMACY W/ HCPCS (ALT 636 FOR OP/ED): Performed by: STUDENT IN AN ORGANIZED HEALTH CARE EDUCATION/TRAINING PROGRAM

## 2024-11-15 PROCEDURE — 85025 COMPLETE CBC W/AUTO DIFF WBC: CPT

## 2024-11-15 PROCEDURE — 36415 COLL VENOUS BLD VENIPUNCTURE: CPT

## 2024-11-15 PROCEDURE — 1100000001 HC PRIVATE ROOM DAILY

## 2024-11-15 PROCEDURE — 2500000005 HC RX 250 GENERAL PHARMACY W/O HCPCS: Performed by: STUDENT IN AN ORGANIZED HEALTH CARE EDUCATION/TRAINING PROGRAM

## 2024-11-15 PROCEDURE — 99233 SBSQ HOSP IP/OBS HIGH 50: CPT | Performed by: STUDENT IN AN ORGANIZED HEALTH CARE EDUCATION/TRAINING PROGRAM

## 2024-11-15 PROCEDURE — 94660 CPAP INITIATION&MGMT: CPT

## 2024-11-15 PROCEDURE — 80069 RENAL FUNCTION PANEL: CPT

## 2024-11-15 PROCEDURE — 2500000001 HC RX 250 WO HCPCS SELF ADMINISTERED DRUGS (ALT 637 FOR MEDICARE OP)

## 2024-11-15 RX ORDER — DIPHENHYDRAMINE HYDROCHLORIDE 50 MG/ML
10 INJECTION INTRAMUSCULAR; INTRAVENOUS ONCE
Status: COMPLETED | OUTPATIENT
Start: 2024-11-15 | End: 2024-11-15

## 2024-11-15 RX ORDER — TRAMADOL HYDROCHLORIDE 50 MG/1
50 TABLET ORAL ONCE
Status: COMPLETED | OUTPATIENT
Start: 2024-11-15 | End: 2024-11-15

## 2024-11-15 RX ORDER — DIPHENHYDRAMINE HYDROCHLORIDE 50 MG/ML
10 INJECTION INTRAMUSCULAR; INTRAVENOUS ONCE
Status: DISCONTINUED | OUTPATIENT
Start: 2024-11-15 | End: 2024-11-15

## 2024-11-15 RX ORDER — PROCHLORPERAZINE EDISYLATE 5 MG/ML
10 INJECTION INTRAMUSCULAR; INTRAVENOUS EVERY 6 HOURS PRN
Status: DISCONTINUED | OUTPATIENT
Start: 2024-11-15 | End: 2024-11-20 | Stop reason: HOSPADM

## 2024-11-15 RX ORDER — ONDANSETRON HYDROCHLORIDE 2 MG/ML
4 INJECTION, SOLUTION INTRAVENOUS EVERY 8 HOURS PRN
Status: DISCONTINUED | OUTPATIENT
Start: 2024-11-15 | End: 2024-11-20 | Stop reason: HOSPADM

## 2024-11-15 RX ORDER — ONDANSETRON HYDROCHLORIDE 2 MG/ML
4 INJECTION, SOLUTION INTRAVENOUS EVERY 8 HOURS PRN
Status: DISCONTINUED | OUTPATIENT
Start: 2024-11-15 | End: 2024-11-15

## 2024-11-15 RX ORDER — OXYCODONE HYDROCHLORIDE 5 MG/1
2.5 TABLET ORAL EVERY 6 HOURS PRN
Status: DISCONTINUED | OUTPATIENT
Start: 2024-11-15 | End: 2024-11-20 | Stop reason: HOSPADM

## 2024-11-15 RX ORDER — ONDANSETRON 4 MG/1
4 TABLET, ORALLY DISINTEGRATING ORAL EVERY 8 HOURS PRN
Status: DISCONTINUED | OUTPATIENT
Start: 2024-11-15 | End: 2024-11-15

## 2024-11-15 ASSESSMENT — COGNITIVE AND FUNCTIONAL STATUS - GENERAL
DAILY ACTIVITIY SCORE: 24
MOBILITY SCORE: 23
CLIMB 3 TO 5 STEPS WITH RAILING: A LITTLE

## 2024-11-15 ASSESSMENT — PAIN SCALES - GENERAL
PAINLEVEL_OUTOF10: 4
PAINLEVEL_OUTOF10: 7
PAINLEVEL_OUTOF10: 7
PAINLEVEL_OUTOF10: 0 - NO PAIN
PAINLEVEL_OUTOF10: 7
PAINLEVEL_OUTOF10: 4
PAINLEVEL_OUTOF10: 6

## 2024-11-15 ASSESSMENT — PAIN DESCRIPTION - ORIENTATION
ORIENTATION: LEFT
ORIENTATION: RIGHT;LOWER

## 2024-11-15 ASSESSMENT — PAIN - FUNCTIONAL ASSESSMENT
PAIN_FUNCTIONAL_ASSESSMENT: 0-10

## 2024-11-15 ASSESSMENT — PAIN DESCRIPTION - LOCATION
LOCATION: BACK
LOCATION: HEAD

## 2024-11-15 NOTE — PROGRESS NOTES
INTERNAL MEDICINE PROGRESS NOTE     BRIEF NARRATIVE      Stacey Heath is a 54 y.o. female on day 6 of admission presenting with Congestive heart failure, unspecified HF chronicity, unspecified heart failure type.    CICU course:   Pt with PMH significant for ESRD 2/2 on dialysis T/Th/Sat via RUE AVF (last complete session 11/07/2024), HTN, HFpEF, COPD, brachial DVT on Eliquis who presented for [facility] ED with a chief complaint of SOB, found to have SBP >200 and briefly required BiPAP in the ED. She was admitted to the CICU for the management of hypertensive emergency with flash pulmonary edema. Suspect 2/2 resistant hypertension vs medication noncompliance. Was treated with Nicardipine gtt and resumed some home BP meds. SBP dropped to 130s so Nicardipine was held. Elevated again to 180s-190s, resumed nifedipine 11/9 . She underwent HD 11/9     Medicine course:   Discussed with nephrology regarding the etiology of resistant hypertension, Dr. Allison who recommended a few more days of hospitalization for daily dialysis.   CTA a/p to assess renal artery pending   Can be discharged Friday after dialysis          -Pt is agreeable with [facility] PT/OT/HHA.    -Severe PAD, EVLS team consulted. CTA shows Moderate to severe circumferential atherosclerotic calcification of the abdominopelvic arteries with mild ostial stenosis of the right renal artery and no significant ostial stenosis of the left renal artery. There is likely component of at least moderate stenosis of the distal branches of the bilateral renal arteries from the aforementioned circumferential calcification.  -Pending  duplex US to determine the  degree of stenosis.        ASSESSMENT  / PLANS      > HTN emergency with flash pulmonary edema   > HFpEF  > Tropenemia, 2/2 TII MI in setting of marked HTN and ESRD   :: home meds: Coreg 50 mg BID, Clonidine 0.2 mg/24 hr patch- 1 patch on skin every 7 days, Hydralazine 100 mg TID , Imdur 120 mg every day , Nifedipine 90 mg every day , Valsartan 320 mg every day   :: TTE 04/2024- LVEF 60-65% with pseudonormal pattern of left ventricular diastolic filling.   :: Renal US 2023 --> Doppler evaluation compatible with history of chronic renal failure on dialysis.   :: Seen by sleep medicine 11/2024 who felt she likely had sleep apnea based on h/p- planning for outpatient sleep study   :: given imdur and hydral in ED   - Weaned off Nicardipine gtt  - Continue with Imdur every day, and coreg 50 BID  - Resumed nifedipine 90 on  11/10   - Can resume other BP meds as above as needed   - Getting HD daily until 11/15   - As per her HFpEF not currently on MRA given renal function, could consider SGLT2i- has not been initiated likely 2/2 ESRD on iHD   - Consider optimizing home BP reg, would favor initiation of Entresto   - serum drug screen negative   - Pending CTA renal artery to assess stenosis as possible etiology of resistant HTN. Pt is allergic to contrast and will need pretreatment        AHRF - resolved    :: Home med Torsemide 40 mg every day on non HD days   :: likely 2/2 flash pulm edema, likely 2/2 fluid redistribution as opposed to fluid overload. Clinical picture not endorsing infection (no fevers/leukocytosis)    - Control BP as above  - Nephro consulted for HD     - Wean O2 as able       ESRD on iHD T/TH/SAT  - c/w home Nephrocaps , Phoslo   - holding home Torsemide 40 mg every day on non HD days   - Nephro rec daily iHD and IUF for 4 days to aid with HTN and fluid overload     HLD   - c/w home statin, aspirin      Peripheral neuropathy   - c/w home gabapentin      COPD without acute exacerbation   - continue home Breo Ellipta 100-25 1 puff daily   -  Duoneb q4H scheduled, albuterol q2H PRN     GERD  - c/w home PPI      Normocytic anemia, likely 2/2 renal disease  - c/w  Epoetin joceline 84847 units three times a week      Hx of LUE DVT  :: per chart review appears that brachial vein DVT in setting of PICC --> provoked   - c/w eliquis for now, suspect can be discontinued as > 6 month duration of therapy has now been completed. Will defer to floor team for final decision      DVT ppx: Eliquis for now   GI ppx: home PPI   Code Status: full code (confirmed on admission)  Surrogate Medical Decision-maker:    Hai Pinedo ([facility]) [phone]        50 min      SUBJECTIVE       PT seen and examined today     OBJECTIVE      Visit Vitals  /56   Pulse 60   Temp 36.6 °C (97.9 °F)   Resp 18        Intake/Output Summary (Last 24 hours) at 11/15/2024 1529  Last data filed at 11/15/2024 1429  Gross per 24 hour   Intake 1440 ml   Output 800 ml   Net 640 ml       Physical Exam   Constitutional:       Appearance: Normal appearance.   HENT:      Head: Normocephalic and atraumatic.      Mouth/Throat:      Pharynx: Oropharynx is clear.   Cardiovascular:      Rate and Rhythm: Normal rate and regular rhythm.   Pulmonary:      Effort: Pulmonary effort is normal.      Breath sounds: Normal breath sounds.   Abdominal:      General: Abdomen is flat. Bowel sounds are normal.      Palpations: Abdomen is soft.   Musculoskeletal:         General: Normal range of motion.      Right lower leg: No edema.      Left lower leg: No edema.   Skin:     General: Skin is warm and dry.   Neurological:      General: No focal deficit present.      Mental Status: She is alert and oriented to person, place, and time.     Current Meds   apixaban, 5 mg, oral, BID  aspirin, 81 mg, oral, Daily  atorvastatin, 80 mg, oral, Nightly  calcium acetate, 1,334 mg, oral, TID  carvedilol, 50 mg, oral, BID  diphenhydrAMINE, 10 mg, intravenous, Once  epoetin joceline-epbx, 10,000 Units, intravenous, Once per day on Monday  Wednesday Friday  fluticasone, 2 spray, Each Nostril, Daily  fluticasone furoate-vilanteroL, 1 puff, inhalation, Daily  gabapentin, 300 mg, oral, Nightly  hydrALAZINE, 100 mg, oral, TID  ipratropium-albuteroL, 3 mL, nebulization, TID after meals  isosorbide mononitrate ER, 120 mg, oral, Daily  NIFEdipine ER, 90 mg, oral, Daily before breakfast  polyethylene glycol, 17 g, oral, Daily  torsemide, 40 mg, oral, Once per day on Sunday Monday Wednesday Friday  vitamin B complex-vitamin C-folic acid, 1 capsule, oral, Daily       PRN medications: acetaminophen, albuterol, hydrOXYzine HCL, [DISCONTINUED] ondansetron ODT **OR** ondansetron, oxyCODONE, oxygen, pantoprazole, prochlorperazine, traMADol     LABS and IMAGING     WBC   Date Value Ref Range Status   11/15/2024 6.9 4.4 - 11.3 x10*3/uL Final   11/14/2024 9.0 4.4 - 11.3 x10*3/uL Final   11/13/2024 6.1 4.4 - 11.3 x10*3/uL Final     Hemoglobin   Date Value Ref Range Status   11/15/2024 9.9 (L) 12.0 - 16.0 g/dL Final   11/14/2024 8.5 (L) 12.0 - 16.0 g/dL Final   11/13/2024 8.2 (L) 12.0 - 16.0 g/dL Final     Hematocrit   Date Value Ref Range Status   11/15/2024 35.2 (L) 36.0 - 46.0 % Final   11/14/2024 28.7 (L) 36.0 - 46.0 % Final   11/13/2024 26.7 (L) 36.0 - 46.0 % Final     Bicarbonate   Date Value Ref Range Status   11/15/2024 26 21 - 32 mmol/L Final   11/14/2024 24 21 - 32 mmol/L Final   11/13/2024 29 21 - 32 mmol/L Final     Creatinine   Date Value Ref Range Status   11/15/2024 5.45 (H) 0.50 - 1.05 mg/dL Final   11/14/2024 10.08 (H) 0.50 - 1.05 mg/dL Final   11/13/2024 6.97 (H) 0.50 - 1.05 mg/dL Final     Calcium   Date Value Ref Range Status   11/15/2024 10.4 8.6 - 10.6 mg/dL Final   11/14/2024 9.7 8.6 - 10.6 mg/dL Final   11/13/2024 9.5 8.6 - 10.6 mg/dL Final       CT angio abdomen pelvis w and or wo IV IV contrast  Narrative: Interpreted By:  Jared Oconnell and Liller Gregory   STUDY:  CT ANGIO ABDOMEN PELVIS W AND/OR WO IV IV CONTRAST;  11/13/2024 1:13  pm       INDICATION:  Signs/Symptoms:Assess renal arteries.      COMPARISON:  CT abdomen pelvis 05/28/2024      ACCESSION NUMBER(S):  [id number]      ORDERING CLINICIAN:  GODWIN MCCLELLAN      TECHNIQUE:  Thin-section axial images of the abdomen and pelvis were obtained in  the arterial phase after intravenous administration of 90 mL  Omnipaque 350 contrast. Sagittal and coronal reformatted images were  provided. MIP images and 3D reconstructions were created on an  independent workstation and reviewed.      FINDINGS:  VASCULATURE:      ABDOMINAL AORTA: No abdominal aortic aneurysm or dissection. Severe  abdominal aortic atherosclerosis.      ABDOMINAL and PELVIC ARTERIES: Moderate to severe circumferential  atherosclerotic calcification of the majority of the abdominopelvic  arteries as can be seen as sequela of longstanding diabetes. There is  mild stenosis of the right renal artery from atherosclerotic  calcification proximally (series 41, image 128). Contrast is seen  distally indicating patency. There is a single right renal artery.  There is a single left renal artery with mild ostial stenosis from  atherosclerotic calcification proximally (series 41, image 135). No  hemodynamically significant stenosis of the superior mesenteric  artery. Severe ostial stenosis of the inferior mesenteric artery with  contrast seen distally indicating patency. No significant stenosis of  the celiac trunk. However, bulky atherosclerotic calcification  extends into the origin of the right common hepatic artery (series  401, image 114) resulting in at least moderate ostial stenosis          CT ABDOMEN/PELVIS:      LOWER CHEST: Cardiomegaly with left ventricular hypertrophy again  noted. Trace left pleural effusion. Atelectasis versus airspace  consolidation noted within the left middle lobe with ground-glass  opacities noted within the left lower lobe. Mosaic attenuation noted  of the bilateral lung bases.      ABDOMINAL WALL: Small  fat containing umbilical hernia. There is mild  body wall anasarca.      LIVER: No significant parenchymal abnormality.      BILE DUCTS: There is mild intra and extrahepatic biliary prominence  likely sequela of reservoir effect due to cholecystectomy.      GALLBLADDER: Surgically absent.      SPLEEN: No significant abnormality. Accessory splenule noted.      PANCREAS: No significant abnormality.      ADRENALS: No significant abnormality.      KIDNEYS, URETERS, BLADDER: Mild bilateral renal cortical atrophy.  Otherwise, the bilateral kidneys enhance symmetrically. There is no  hydroureteronephrosis or obstructing radiopaque stone. Bladder wall  is diffusely thickened.      REPRODUCTIVE ORGANS: Prominent periuterine vasculature noted with  dense calcification.      VESSELS: (See above). No additional significant abnormality.      LYMPH NODES/RETROPERITONEUM: No mesenteric or retroperitoneal  lymphadenopathy by size criteria. There is mild mesenteric edema.      BOWEL/MESENTERY/PERITONEUM: Stomach is unremarkable. The large small  bowel are normal caliber without bowel wall thickening or dilation.  The appendix is surgically absent.      No significant ascites, free air, or fluid loculated collection.          OSSEOUS STRUCTURES: The bones are diffusely sclerotic in appearance  can be seen in the setting of renal osteodystrophy. No suspicious  osseous lesion or acute osseous injury. Minimal discogenic  degenerative changes.      Impression: 1. Moderate to severe circumferential atherosclerotic calcification  of the abdominopelvic arteries with mild ostial stenosis of the right  renal artery and no significant ostial stenosis of the left renal  artery. There is likely component of at least moderate stenosis of  the distal branches of the bilateral renal arteries from the  aforementioned circumferential calcification.  2. Moderate to severe ostial stenosis of the common hepatic artery  from bulky atherosclerotic  calcification as described.  3. Moderate to severe ostial stenosis of the inferior mesenteric  artery.  4. Diffuse bladder wall thickening noted. Correlation with urinalysis  recommended.  5. Partially visualized cardiomegaly and left ventricular hypertrophy  with mosaic attenuation of the bilateral lung bases, mild body wall  anasarca, and small volume left layering pleural effusion most  consistent with CHF exacerbation/fluid overload.  6. Partially visualized ground-glass opacities noted within the left  lower lobe and lingula. Correlation with dedicated CT chest and  concern for infection recommended.  7. Additional findings as above.      I personally reviewed the images/study and I agree with the findings  as stated above by resident physician, Dr. Nate Duran.      MACRO:  None      Signed by: Jared Oconnell 11/15/2024 5:59 AM  Dictation workstation:   [id number]         PROBLEM LISTS      Problem List Items Addressed This Visit       Polyneuropathy due to type 2 diabetes mellitus (Multi)    Relevant Orders    Referral to Home Care    Low back pain    Relevant Orders    Referral to Home Care    Hypertensive emergency    Resistant hypertension    Relevant Orders    Referral to Home Care    Vascular UsSRenal Artery Duplex Complete    ESRD on hemodialysis (Multi)    Relevant Orders    Referral to Home Care    Sleep-related breathing disorder    Relevant Orders    Referral to Home Care    * (Principal) Congestive heart failure, unspecified HF chronicity, unspecified heart failure type - Primary    Relevant Medications    carvedilol (Coreg) tablet 50 mg    isosorbide mononitrate ER (Imdur) 24 hr tablet 120 mg    NIFEdipine ER (Adalat CC) 24 hr tablet 90 mg     Other Visit Diagnoses       Pneumonia due to infectious organism, unspecified laterality, unspecified part of lung        PAD (peripheral artery disease) (CMS-HCC)        Relevant Orders    Vascular US Ankle Brachial Index (DEBORAH) Without Exercise     Vascular UsSRenal Artery Duplex Complete    End stage renal disease (Multi)        Relevant Orders    Vascular UsSRenal Artery Duplex Complete            This dictation was created with voice recognition software. Although every effort is made to review the dictation as it is transcribed, on occasion spoken words, phrases, names, numbers, punctuation etc can be misinterpreted by the the technology leading to omissions or inappropriate outcomes.     Soniya Ruff MD

## 2024-11-15 NOTE — POST-PROCEDURE NOTE
Report to Receiving RN:    Report To: Александр Finnegan  Time Report Called: 1416  Hand-Off Communication: Pt received 1hr 3 minutes of ultrafiltration treatment; tx ended early d/t hypotension per Dr. Allison. Pt stable at discharge back to nursing floor.  Complications During Treatment: Yes, hypotension; tx ended early  Ultrafiltration Treatment: Yes, 400 ml of fluid removed  Medications Administered During Dialysis: Yes  Blood Products Administered During Dialysis: No  Labs Sent During Dialysis: No  Heparin Drip Rate Changes: N/A  Dialysis Catheter Dressing: N/A  Last Dressing Change: N/A    Electronic Signatures:   (Signed CH)   Authored:    (Signed )   Authored:     Last Updated: 3:14 PM by DULCE MARLEY

## 2024-11-15 NOTE — NURSING NOTE
Report to Receiving RN:    Report To: Violeta ISAAC  Time Report Called: 2050  Hand-Off Communication: tolerated treatment, 1.5 liters of fluid removed, VSS  Complications During Treatment: No  Ultrafiltration Treatment: No  Medications Administered During Dialysis: No  Blood Products Administered During Dialysis: No  Labs Sent During Dialysis: No  Heparin Drip Rate Changes: N/A  Dialysis Catheter Dressing: N/A  Last Dressing Change: N/A

## 2024-11-15 NOTE — CARE PLAN
The patient's goals for the shift include      The clinical goals for the shift include Pt will wear CPAP machine for  HS throughout shift    Over the shift, the patient did not make progress toward the following goals. Barriers to progression include   Problem: Fall/Injury  Goal: Not fall by end of shift  Outcome: Progressing  Goal: Be free from injury by end of the shift  Outcome: Progressing  Goal: Verbalize understanding of personal risk factors for fall in the hospital  Outcome: Progressing  Goal: Verbalize understanding of risk factor reduction measures to prevent injury from fall in the home  Outcome: Progressing  Goal: Use assistive devices by end of the shift  Outcome: Progressing  Goal: Pace activities to prevent fatigue by end of the shift  Outcome: Progressing     Problem: Pain - Adult  Goal: Verbalizes/displays adequate comfort level or baseline comfort level  Outcome: Progressing     Problem: Safety - Adult  Goal: Free from fall injury  Outcome: Progressing     Problem: Discharge Planning  Goal: Discharge to home or other facility with appropriate resources  Outcome: Progressing     Problem: Chronic Conditions and Co-morbidities  Goal: Patient's chronic conditions and co-morbidity symptoms are monitored and maintained or improved  Outcome: Progressing     Problem: Heart Failure  Goal: Improved gas exchange this shift  Outcome: Progressing  Goal: Improved urinary output this shift  Outcome: Progressing  Goal: Reduction in peripheral edema within 24 hours  Outcome: Progressing  Goal: Report improvement of dyspnea/breathlessness this shift  Outcome: Progressing  Goal: Weight from fluid excess reduced over 2-3 days, then stabilize  Outcome: Progressing  Goal: Increase self care and/or family involvement in 24 hours  Outcome: Progressing     Problem: Diabetes  Goal: Achieve decreasing blood glucose levels by end of shift  Outcome: Progressing  Goal: Increase stability of blood glucose readings by end of  shift  Outcome: Progressing  Goal: Decrease in ketones present in urine by end of shift  Outcome: Progressing  Goal: Maintain electrolyte levels within acceptable range throughout shift  Outcome: Progressing  Goal: Maintain glucose levels >70mg/dl to <250mg/dl throughout shift  Outcome: Progressing  Goal: No changes in neurological exam by end of shift  Outcome: Progressing  Goal: Learn about and adhere to nutrition recommendations by end of shift  Outcome: Progressing  Goal: Vital signs within normal range for age by end of shift  Outcome: Progressing  Goal: Increase self care and/or family involovement by end of shift  Outcome: Progressing  Goal: Receive DSME education by end of shift  Outcome: Progressing

## 2024-11-15 NOTE — CONSULTS
"Inpatient consult to [facility]  Consult performed by: LILLY Becerra-CNP  Consult ordered by: Soniya Ruff MD        History Of Present Illness:    Stacey Heath is a 54 y.o. female presenting with to the ED on 11/8/2024 with SOB and hypertensive urgency with SBP > 200 was admitted to CICU for management of hypertensive emergency with flash pulmonary edema. 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   Per chart review, has had numerous admissions for HTN emergency/crisis including 1 already in the month of November and 2 admissions in October with one presentation for iHD and discharge from ED. Evaluated by sleep medicine 11/2024 who feel she may have component of JAMSHID, will need formal sleep study.   On interview: Pt reported that she is complaint with her medications and HD but continues to struggle with elevated BPs and SOB. She reports she is not able to walk long distance due to MATHEW. Currently does not wear oxygen at home but has been requiring while in hospital and plan to discharged home on it. Per pt still making urine about 1-2 times a day. She also reported bilateral leg cramping that started about a month ago \"feels like dwayne horse\", both at rest and with ambulation, no prior peripheral vascular work-up or intervention. Pain is not aggravated by ambulation and endorsed occasional numbness and tingling. CT angio abdomen pel done that showed mild to moderate distal branches of bilateral renal arteries. EVLS consulted for evaluation of resistant HTN and PAD.         Last Recorded Vitals:  Vitals:    11/15/24 0246 11/15/24 0502 11/15/24 0848 11/15/24 0936   BP:  177/81 166/68 153/64   BP Location:  Left arm     Patient Position:  Lying     Pulse:  71 90 86   Resp: 17 16     Temp:  36.8 °C (98.2 °F)     TempSrc:  Temporal     SpO2:  100%     Weight:  54.8 kg (120 lb 13 oz)     Height:           Last " Labs:  CBC - 11/15/2024:  8:05 AM  6.9 9.9 240    35.2      CMP - 11/15/2024:  8:05 AM  10.4 6.8 37 --- 0.5   4.9 4.2 12 90      PTT - 11/9/2024:  8:29 AM  1.3   14.6 38     Troponin I, High Sensitivity   Date/Time Value Ref Range Status   05/28/2024 02:54 PM 57 (H) 0 - 34 ng/L Final   05/28/2024 11:11 AM 60 (H) 0 - 34 ng/L Final   04/13/2024 04:39 PM 31 0 - 34 ng/L Final     Troponin I, High Sensitivity ([facility])   Date/Time Value Ref Range Status   11/08/2024 09:06 PM 39 (H) 0 - 34 ng/L Final   11/08/2024 08:30 PM 41 (H) 0 - 34 ng/L Final   11/02/2024 03:12 AM 42 (H) 0 - 34 ng/L Final     BNP   Date/Time Value Ref Range Status   11/08/2024 08:30 PM 2,229 (H) 0 - 99 pg/mL Final   11/01/2024 10:12 PM 1,679 (H) 0 - 99 pg/mL Final     Hemoglobin A1C   Date/Time Value Ref Range Status   11/09/2024 03:20 AM 4.7 See comment % Final   06/29/2024 04:53 AM 5.2 see below % Final     LDL Calculated   Date/Time Value Ref Range Status   11/09/2024 03:20 AM 54 <=99 mg/dL Final     Comment:                                 Near   Borderline      AGE      Desirable  Optimal    High     High     Very High     0-19 Y     0 - 109     ---    110-129   >/= 130     ----    20-24 Y     0 - 119     ---    120-159   >/= 160     ----      >24 Y     0 -  99   100-129  130-159   160-189     >/=190     06/29/2024 04:53 AM 37 <=99 mg/dL Final     Comment:                                 Near   Borderline      AGE      Desirable  Optimal    High     High     Very High     0-19 Y     0 - 109     ---    110-129   >/= 130     ----    20-24 Y     0 - 119     ---    120-159   >/= 160     ----      >24 Y     0 -  99   100-129  130-159   160-189     >/=190     01/15/2024 09:49 PM 48 <=99 mg/dL Final     Comment:                                 Near   Borderline      AGE      Desirable  Optimal    High     High     Very High     0-19 Y     0 - 109     ---    110-129   >/= 130     ----    20-24 Y     0 - 119     ---    120-159   >/= 160     ----      >24 Y      0 -  99   100-129  130-159   160-189     >/=190       VLDL   Date/Time Value Ref Range Status   11/09/2024 03:20 AM 9 0 - 40 mg/dL Final   06/29/2024 04:53 AM 11 0 - 40 mg/dL Final   01/15/2024 09:49 PM 21 0 - 40 mg/dL Final      Last I/O:  I/O last 3 completed shifts:  In: 880 (16.1 mL/kg) [P.O.:480; I.V.:400 (7.3 mL/kg)]  Out: - (0 mL/kg)   Weight: 54.8 kg       Past Medical History:  She has a past medical history of Bacteremia due to methicillin resistant Staphylococcus aureus (07/08/2024), Cardiac/pericardial tamponade (01/20/2024), CHF (congestive heart failure), COPD (chronic obstructive pulmonary disease) (Multi), Coronary artery disease, Disorder of sweat glands (02/25/2023), Dry eye syndrome of bilateral lacrimal glands (03/07/2017), ESRD (end stage renal disease) (Multi), Essential (primary) hypertension (12/27/2022), History of acute pancreatitis (12/21/2020), Low grade squamous intraepithelial lesion (LGSIL) on cervicovaginal cytologic smear (02/25/2023), Migraines, 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 (02/25/2023), and Type 2 myocardial infarction (Multi) (01/20/2024).    Past Surgical History:  She has a past surgical history that includes Tubal ligation (03/07/2017); Other surgical history (03/07/2017); Appendectomy (03/07/2017); Other surgical history (03/07/2017); Other surgical history (12/08/2021); Other surgical history (12/08/2021); and CT angio abdomen w and or wo IV IV contrast (4/23/2023).      Social History:  She reports that she has quit smoking. Her smoking use included cigarettes. She has been exposed to tobacco smoke. She has never used smokeless tobacco. She reports that she does not currently use alcohol. She reports that she does not currently use drugs after having used the following drugs: Cocaine and Marijuana.    Family History:  Family History   Problem Relation Name Age of Onset    Other (Cerebrovascular Accident) Father      Heart failure Paternal Grandmother      Breast  cancer Paternal Grandmother      Other (Primary Cervical Cancer) Paternal Grandmother      Diabetes Paternal Grandfather      Breast cancer Father's Sister      Ovarian cancer Father's Sister          Allergies:  Iodine, Bee pollen, Bioflavonoids, Citrus and derivatives, Codeine, Flowers, and Shellfish containing products    Inpatient Medications:  Scheduled medications   Medication Dose Route Frequency    apixaban  5 mg oral BID    aspirin  81 mg oral Daily    atorvastatin  80 mg oral Nightly    calcium acetate  1,334 mg oral TID    carvedilol  50 mg oral BID    epoetin joceline-epbx  10,000 Units intravenous Once per day on Monday Wednesday Friday    fluticasone  2 spray Each Nostril Daily    fluticasone furoate-vilanteroL  1 puff inhalation Daily    gabapentin  300 mg oral Nightly    hydrALAZINE  100 mg oral TID    ipratropium-albuteroL  3 mL nebulization TID after meals    isosorbide mononitrate ER  120 mg oral Daily    methylPREDNISolone sodium succinate (PF)  40 mg intravenous Once    NIFEdipine ER  90 mg oral Daily before breakfast    polyethylene glycol  17 g oral Daily    torsemide  40 mg oral Once per day on Sunday Monday Wednesday Friday    vitamin B complex-vitamin C-folic acid  1 capsule oral Daily     PRN medications   Medication    acetaminophen    albuterol    hydrOXYzine HCL    ondansetron ODT    Or    ondansetron    oxygen    oxygen    pantoprazole    traMADol     Continuous Medications   Medication Dose Last Rate     Outpatient Medications:  Current Outpatient Medications   Medication Instructions    albuterol (ProAir HFA) 90 mcg/actuation inhaler 2 puffs, inhalation, Every 4 hours PRN    albuterol 1.25 mg, nebulization, Every 6 hours PRN    aspirin 81 mg, oral, Daily    atorvastatin (LIPITOR) 80 mg, oral, Nightly    calcium acetate (PHOSLO) 1,334 mg, oral, 3 times daily (morning, midday, late afternoon)    carvedilol (COREG) 50 mg, oral, 2 times daily    cloNIDine (Catapres-TTS) 0.2 mg/24 hr patch  Apply one patch on the skin and replace every 7 days, as directed. Do not start before October 31, 2024.    Eliquis 5 mg, oral, 2 times daily    epoetin joceline (EPOGEN,PROCRIT) 10,000 Units, subcutaneous, 3 times weekly    fluticasone (Flonase) 50 mcg/actuation nasal spray 2 sprays, Each Nostril, Daily, Shake gently. Before first use, prime pump. After use, clean tip and replace cap.    fluticasone furoate-vilanteroL (Breo Ellipta) 100-25 mcg/dose inhaler 1 puff, inhalation, Daily RT    gabapentin (NEURONTIN) 300 mg, oral, Nightly    hydrALAZINE (APRESOLINE) 100 mg, oral, 3 times daily    hydrOXYzine HCL (ATARAX) 25 mg, oral, Every 6 hours PRN    isosorbide mononitrate ER (IMDUR) 120 mg, oral, Daily, Do not crush or chew.    NIFEdipine ER (ADALAT CC) 90 mg, oral, Daily before breakfast, Do not crush, chew, or split.    pantoprazole (PROTONIX) 40 mg, oral, As needed, Do not crush, chew, or split.    polyethylene glycol (Glycolax, Miralax) 17 gram/dose powder Take 17 g (1 scoop dissolved in liquid) by mouth once daily. Do not start before July 26, 2024.    SUMAtriptan (IMITREX) 25 mg, oral, Once as needed, May repeat dose once in 2 hours if no relief.  Do not exceed 2 doses in 24 hours.    torsemide (Demadex) 20 mg tablet Take 2 tablets once daily on non-dialysis days only. Do not take on dialysis days    valsartan (DIOVAN) 320 mg, oral, Every evening    vitamin B complex-vitamin C-folic acid (Nephrocaps) 1 mg capsule 1 capsule, oral, Daily     Relevant Testing Result Reviewed     CT angio abdomen pelvis w and or wo IV IV contrast  11/13/2024  IMPRESSION:  1. Moderate to severe circumferential atherosclerotic calcification  of the abdominopelvic arteries with mild ostial stenosis of the right  renal artery and no significant ostial stenosis of the left renal  artery. There is likely component of at least moderate stenosis of  the distal branches of the bilateral renal arteries from the  aforementioned circumferential  calcification.  2. Moderate to severe ostial stenosis of the common hepatic artery  from bulky atherosclerotic calcification as described.  3. Moderate to severe ostial stenosis of the inferior mesenteric  artery.  4. Diffuse bladder wall thickening noted. Correlation with urinalysis  recommended.  5. Partially visualized cardiomegaly and left ventricular hypertrophy  with mosaic attenuation of the bilateral lung bases, mild body wall  anasarca, and small volume left layering pleural effusion most  consistent with CHF exacerbation/fluid overload.  6. Partially visualized ground-glass opacities noted within the left  lower lobe and lingula. Correlation with dedicated CT chest and  concern for infection recommended.    Physical Exam:  Constitutional: NAD, pleasant, cooperative     Head/Neck: Neck supple, No JVD, + carotid bruits           Respiratory/Thorax: CTAB, thorax symmetric, 2L NC, no increased WOB, not dyspneic to conversation      Cardiovascular: Regular, rate and rhythm, + SE murmurs, normal S 1 and S 2        Skin: Warm and dry , bilateral feet cool and dry/flaky skin             Extremities: YI, No edema, no discoloration, no open sores, bilateral DP/PT signal monophasic, motor and sensation intact bilateral   Neuro: non-focal, awake/alert/oriented x3,   Psychological: Appropriate mood and behavior        Assessment/Plan   54 y.o female with h/o ESRD on HD, HFpEF, COPD and resistant HTN. Currently on the nursing floor stable after a short ICU stable for hypertensive emergency with flash pulmonary edema. Pt reports complaince with home BP medications and HD but she has had multiple hospital admissions for the same issue. EVLS consulted for evaluation of resistant HTN and PAD. Review of the CT angio does not suggest significant stenosis of the renal arteries that will suggest her current issue. She is also c/o bilateral leg cramping, she does have Doppler signal of DP/PT/AT bilateral feet are cool might be  chronic calcification of the LE arteries that is often seen in patients with ESRD.     #Resistant HTN r/o CHANADN  #PAD    - DEBORAH/TBI and Renal duplex US ordered. We will need renal velocities to assess the degree of stenosis and if it is contributory to her uncontrolled HTN.   - continue with ASA and statin therapy  - unlikely pt will need intervention during hospital admission as this is chronic arterial insufficiency without life-style limiting claudication/rest pain or tissue loss. We will need to review both tests and offer needed treatment as an outpatient.  - Pt will need to FU in EVLS clinic as an outpatient in 4-6 weeks (we will arrange)   - Will FU on these tests Monday and offer further plan as indicated  - Case, images and plan discussed with EVLS fellow Dr. Rose. Discussion with attending Dr Snell pending.      Thank you for the consult, If you have a questions or concerns please do not hesitate to contact us. EVLS will follow up next week Monday.       Peripheral IV 11/08/24 18 G Left Antecubital (Active)   Site Assessment Clean;Dry;Intact 11/15/24 0852   Dressing Status Clean;Dry 11/15/24 0852   Number of days: 7       Hemodialysis Arteriovenous Fistula 07/01/24 Right Upper arm (Active)   Site Assessment Clean;Dry;Intact 11/15/24 0851   Dressing Status Clean;Dry 11/15/24 0851   Number of days: 137       Code Status:  Full Code    I spent 60 minutes in the professional and overall care of this patient.        LILLY Becerra-CNP  [facility]   Day: [id number]/Haiku/Night HHVI [id number]

## 2024-11-15 NOTE — NURSING NOTE
Report from Sending RN:    Report From: Bladimir  Recent Surgery of Procedure: No  Baseline Level of Consciousness (LOC): A and O x 4  Oxygen Use: Yes  Type: nc  Diabetic: No  Last BP Med Given Day of Dialysis: AM meds given  Last Pain Med Given: None  Lab Tests to be Obtained with Dialysis: No  Blood Transfusion to be Given During Dialysis: No  Available IV Access: Yes  Medications to be Administered During Dialysis: No  Continuous IV Infusion Running: No  Restraints on Currently or in the Last 24 Hours: No  Hand-Off Communication: Is stable to come to dialysis  Dialysis Catheter Dressing: AVF  Last Dressing Change: AVF

## 2024-11-16 ENCOUNTER — APPOINTMENT (OUTPATIENT)
Dept: DIALYSIS | Facility: HOSPITAL | Age: 54
End: 2024-11-16
Payer: COMMERCIAL

## 2024-11-16 LAB
ALBUMIN SERPL BCP-MCNC: 3.9 G/DL (ref 3.4–5)
ANION GAP SERPL CALC-SCNC: 21 MMOL/L (ref 10–20)
BASOPHILS # BLD AUTO: 0.08 X10*3/UL (ref 0–0.1)
BASOPHILS NFR BLD AUTO: 0.9 %
BUN SERPL-MCNC: 54 MG/DL (ref 6–23)
CALCIUM SERPL-MCNC: 9.6 MG/DL (ref 8.6–10.6)
CHLORIDE SERPL-SCNC: 96 MMOL/L (ref 98–107)
CO2 SERPL-SCNC: 28 MMOL/L (ref 21–32)
CREAT SERPL-MCNC: 7.98 MG/DL (ref 0.5–1.05)
EGFRCR SERPLBLD CKD-EPI 2021: 6 ML/MIN/1.73M*2
EOSINOPHIL # BLD AUTO: 0.78 X10*3/UL (ref 0–0.7)
EOSINOPHIL NFR BLD AUTO: 8.6 %
ERYTHROCYTE [DISTWIDTH] IN BLOOD BY AUTOMATED COUNT: 18.8 % (ref 11.5–14.5)
GLUCOSE SERPL-MCNC: 105 MG/DL (ref 74–99)
HCT VFR BLD AUTO: 30.4 % (ref 36–46)
HGB BLD-MCNC: 9.1 G/DL (ref 12–16)
IMM GRANULOCYTES # BLD AUTO: 0.07 X10*3/UL (ref 0–0.7)
IMM GRANULOCYTES NFR BLD AUTO: 0.8 % (ref 0–0.9)
LYMPHOCYTES # BLD AUTO: 1.03 X10*3/UL (ref 1.2–4.8)
LYMPHOCYTES NFR BLD AUTO: 11.4 %
MAGNESIUM SERPL-MCNC: 2.5 MG/DL (ref 1.6–2.4)
MCH RBC QN AUTO: 29.4 PG (ref 26–34)
MCHC RBC AUTO-ENTMCNC: 29.9 G/DL (ref 32–36)
MCV RBC AUTO: 98 FL (ref 80–100)
MONOCYTES # BLD AUTO: 0.71 X10*3/UL (ref 0.1–1)
MONOCYTES NFR BLD AUTO: 7.9 %
NEUTROPHILS # BLD AUTO: 6.37 X10*3/UL (ref 1.2–7.7)
NEUTROPHILS NFR BLD AUTO: 70.4 %
NRBC BLD-RTO: 0.6 /100 WBCS (ref 0–0)
PHOSPHATE SERPL-MCNC: 5.6 MG/DL (ref 2.5–4.9)
PLATELET # BLD AUTO: 252 X10*3/UL (ref 150–450)
POTASSIUM SERPL-SCNC: 5.7 MMOL/L (ref 3.5–5.3)
RBC # BLD AUTO: 3.09 X10*6/UL (ref 4–5.2)
SODIUM SERPL-SCNC: 139 MMOL/L (ref 136–145)
WBC # BLD AUTO: 9 X10*3/UL (ref 4.4–11.3)

## 2024-11-16 PROCEDURE — 85025 COMPLETE CBC W/AUTO DIFF WBC: CPT

## 2024-11-16 PROCEDURE — 80069 RENAL FUNCTION PANEL: CPT

## 2024-11-16 PROCEDURE — 2500000002 HC RX 250 W HCPCS SELF ADMINISTERED DRUGS (ALT 637 FOR MEDICARE OP, ALT 636 FOR OP/ED): Performed by: STUDENT IN AN ORGANIZED HEALTH CARE EDUCATION/TRAINING PROGRAM

## 2024-11-16 PROCEDURE — 8010000001 HC DIALYSIS - HEMODIALYSIS PER DAY

## 2024-11-16 PROCEDURE — 2500000001 HC RX 250 WO HCPCS SELF ADMINISTERED DRUGS (ALT 637 FOR MEDICARE OP): Performed by: STUDENT IN AN ORGANIZED HEALTH CARE EDUCATION/TRAINING PROGRAM

## 2024-11-16 PROCEDURE — 94640 AIRWAY INHALATION TREATMENT: CPT

## 2024-11-16 PROCEDURE — 83735 ASSAY OF MAGNESIUM: CPT

## 2024-11-16 PROCEDURE — 2500000004 HC RX 250 GENERAL PHARMACY W/ HCPCS (ALT 636 FOR OP/ED): Performed by: STUDENT IN AN ORGANIZED HEALTH CARE EDUCATION/TRAINING PROGRAM

## 2024-11-16 PROCEDURE — 1100000001 HC PRIVATE ROOM DAILY

## 2024-11-16 PROCEDURE — 36415 COLL VENOUS BLD VENIPUNCTURE: CPT

## 2024-11-16 PROCEDURE — 99231 SBSQ HOSP IP/OBS SF/LOW 25: CPT | Performed by: STUDENT IN AN ORGANIZED HEALTH CARE EDUCATION/TRAINING PROGRAM

## 2024-11-16 RX ORDER — GUAIFENESIN 100 MG/5ML
200 SOLUTION ORAL EVERY 4 HOURS PRN
Status: DISCONTINUED | OUTPATIENT
Start: 2024-11-16 | End: 2024-11-20 | Stop reason: HOSPADM

## 2024-11-16 ASSESSMENT — PAIN SCALES - WONG BAKER: WONGBAKER_NUMERICALRESPONSE: NO HURT

## 2024-11-16 ASSESSMENT — PAIN DESCRIPTION - LOCATION
LOCATION: ABDOMEN
LOCATION: NOSE
LOCATION: NOSE

## 2024-11-16 ASSESSMENT — PAIN SCALES - GENERAL
PAINLEVEL_OUTOF10: 8
PAINLEVEL_OUTOF10: 4
PAINLEVEL_OUTOF10: 6
PAINLEVEL_OUTOF10: 8
PAINLEVEL_OUTOF10: 4

## 2024-11-16 ASSESSMENT — PAIN - FUNCTIONAL ASSESSMENT
PAIN_FUNCTIONAL_ASSESSMENT: WONG-BAKER FACES
PAIN_FUNCTIONAL_ASSESSMENT: 0-10

## 2024-11-16 ASSESSMENT — PAIN DESCRIPTION - DESCRIPTORS: DESCRIPTORS: SORE

## 2024-11-16 ASSESSMENT — PAIN DESCRIPTION - ORIENTATION: ORIENTATION: LEFT;LOWER

## 2024-11-16 NOTE — NURSING NOTE
Report to Receiving RN:    Report To: Cindy RN  Time Report Called: 1200  Hand-Off Communication: UF turned off for short period for C/O cramping, VSS, 1.8 liters of fluid removed  Complications During Treatment: Yes, cramping and nausea  Ultrafiltration Treatment: No  Medications Administered During Dialysis: Yes, tramadol 50 mg po, zofran 4 mg IV  Blood Products Administered During Dialysis: No  Labs Sent During Dialysis: Yes, RFP, Mg, CBC  Heparin Drip Rate Changes: N/A  Dialysis Catheter Dressing: N/A  Last Dressing Change: N/A

## 2024-11-16 NOTE — CARE PLAN
The patient's goals for the shift include      The clinical goals for the shift include Pt will remain free from falls/ injury and her BP will remain HDS during this shift    Patient remained safe and stable this shift, slept intermittently at night, pain managed with ordered pain meds.  BP remained stable this shift.

## 2024-11-16 NOTE — NURSING NOTE
.Report from Sending RN:    Report From: PONCHO Almanza  Recent Surgery of Procedure: No  Baseline Level of Consciousness (LOC): A&O X3  Oxygen Use: Yes  Type: 2L NC  Diabetic: No  Last BP Med Given Day of Dialysis: none  Last Pain Med Given: none  Lab Tests to be Obtained with Dialysis: No  Blood Transfusion to be Given During Dialysis: No  Available IV Access: Yes  Medications to be Administered During Dialysis: No  Continuous IV Infusion Running: No  Restraints on Currently or in the Last 24 Hours: No  Hand-Off Communication: Pt had no acute event overnight, vital signs stable. Not on precaution, no BP medication given.   Dialysis Catheter Dressing: AVF  Last Dressing Change: N/A

## 2024-11-17 VITALS
TEMPERATURE: 97 F | WEIGHT: 116.4 LBS | RESPIRATION RATE: 16 BRPM | HEIGHT: 55 IN | HEART RATE: 90 BPM | SYSTOLIC BLOOD PRESSURE: 135 MMHG | BODY MASS INDEX: 26.94 KG/M2 | OXYGEN SATURATION: 99 % | DIASTOLIC BLOOD PRESSURE: 58 MMHG

## 2024-11-17 LAB
ALBUMIN SERPL BCP-MCNC: 4.3 G/DL (ref 3.4–5)
ANION GAP SERPL CALC-SCNC: 20 MMOL/L (ref 10–20)
BUN SERPL-MCNC: 32 MG/DL (ref 6–23)
CALCIUM SERPL-MCNC: 10.5 MG/DL (ref 8.6–10.6)
CHLORIDE SERPL-SCNC: 93 MMOL/L (ref 98–107)
CO2 SERPL-SCNC: 27 MMOL/L (ref 21–32)
CREAT SERPL-MCNC: 6 MG/DL (ref 0.5–1.05)
EGFRCR SERPLBLD CKD-EPI 2021: 8 ML/MIN/1.73M*2
ERYTHROCYTE [DISTWIDTH] IN BLOOD BY AUTOMATED COUNT: 18.6 % (ref 11.5–14.5)
GLUCOSE SERPL-MCNC: 232 MG/DL (ref 74–99)
HCT VFR BLD AUTO: 36.3 % (ref 36–46)
HGB BLD-MCNC: 10.8 G/DL (ref 12–16)
MCH RBC QN AUTO: 29.9 PG (ref 26–34)
MCHC RBC AUTO-ENTMCNC: 29.8 G/DL (ref 32–36)
MCV RBC AUTO: 101 FL (ref 80–100)
NRBC BLD-RTO: 0 /100 WBCS (ref 0–0)
PHOSPHATE SERPL-MCNC: 4.1 MG/DL (ref 2.5–4.9)
PLATELET # BLD AUTO: 268 X10*3/UL (ref 150–450)
POTASSIUM SERPL-SCNC: 4.6 MMOL/L (ref 3.5–5.3)
RBC # BLD AUTO: 3.61 X10*6/UL (ref 4–5.2)
SODIUM SERPL-SCNC: 135 MMOL/L (ref 136–145)
WBC # BLD AUTO: 8 X10*3/UL (ref 4.4–11.3)

## 2024-11-17 PROCEDURE — 2500000001 HC RX 250 WO HCPCS SELF ADMINISTERED DRUGS (ALT 637 FOR MEDICARE OP): Performed by: STUDENT IN AN ORGANIZED HEALTH CARE EDUCATION/TRAINING PROGRAM

## 2024-11-17 PROCEDURE — 80069 RENAL FUNCTION PANEL: CPT | Performed by: STUDENT IN AN ORGANIZED HEALTH CARE EDUCATION/TRAINING PROGRAM

## 2024-11-17 PROCEDURE — 2500000001 HC RX 250 WO HCPCS SELF ADMINISTERED DRUGS (ALT 637 FOR MEDICARE OP)

## 2024-11-17 PROCEDURE — 36415 COLL VENOUS BLD VENIPUNCTURE: CPT | Performed by: STUDENT IN AN ORGANIZED HEALTH CARE EDUCATION/TRAINING PROGRAM

## 2024-11-17 PROCEDURE — 1100000001 HC PRIVATE ROOM DAILY

## 2024-11-17 PROCEDURE — 99232 SBSQ HOSP IP/OBS MODERATE 35: CPT | Performed by: STUDENT IN AN ORGANIZED HEALTH CARE EDUCATION/TRAINING PROGRAM

## 2024-11-17 PROCEDURE — 94640 AIRWAY INHALATION TREATMENT: CPT

## 2024-11-17 PROCEDURE — 85027 COMPLETE CBC AUTOMATED: CPT | Performed by: STUDENT IN AN ORGANIZED HEALTH CARE EDUCATION/TRAINING PROGRAM

## 2024-11-17 PROCEDURE — 2500000002 HC RX 250 W HCPCS SELF ADMINISTERED DRUGS (ALT 637 FOR MEDICARE OP, ALT 636 FOR OP/ED): Performed by: STUDENT IN AN ORGANIZED HEALTH CARE EDUCATION/TRAINING PROGRAM

## 2024-11-17 ASSESSMENT — COGNITIVE AND FUNCTIONAL STATUS - GENERAL
DAILY ACTIVITIY SCORE: 24
MOBILITY SCORE: 24

## 2024-11-17 ASSESSMENT — PAIN SCALES - GENERAL
PAINLEVEL_OUTOF10: 2
PAINLEVEL_OUTOF10: 2
PAINLEVEL_OUTOF10: 7

## 2024-11-17 ASSESSMENT — PAIN DESCRIPTION - LOCATION: LOCATION: NOSE

## 2024-11-17 ASSESSMENT — PAIN - FUNCTIONAL ASSESSMENT: PAIN_FUNCTIONAL_ASSESSMENT: 0-10

## 2024-11-17 NOTE — PROGRESS NOTES
INTERNAL MEDICINE PROGRESS NOTE     BRIEF NARRATIVE      Stacey Heath is a 54 y.o. female on day 8 of admission presenting with Congestive heart failure, unspecified HF chronicity, unspecified heart failure type.    CICU course:   Pt with PMH significant for ESRD 2/2 on dialysis T/Th/Sat via RUE AVF (last complete session 11/07/2024), HTN, HFpEF, COPD, brachial DVT on Eliquis who presented for [facility] ED with a chief complaint of SOB, found to have SBP >200 and briefly required BiPAP in the ED. She was admitted to the CICU for the management of hypertensive emergency with flash pulmonary edema. Suspect 2/2 resistant hypertension vs medication noncompliance. Was treated with Nicardipine gtt and resumed some home BP meds. SBP dropped to 130s so Nicardipine was held. Elevated again to 180s-190s, resumed nifedipine 11/9 . She underwent HD 11/9          - DEBORAH/TBI and Renal duplex US ordered, need renal velocities to assess the degree of stenosis and if it is contributory to her uncontrolled HTN.   - Follow up in EVLS clinic OP  - N/v resolving         -Pt is agreeable with [facility] PT/OT/HHA.    -Severe PAD, EVLS team consulted. CTA shows Moderate to severe circumferential atherosclerotic calcification of the abdominopelvic arteries with mild ostial stenosis of the right renal artery and no significant ostial stenosis of the left renal artery. There is likely component of at least moderate stenosis of the distal branches of the bilateral renal arteries from the aforementioned circumferential calcification.  -Pending  duplex US to determine the  degree of stenosis.        ASSESSMENT / PLANS      > HTN emergency with flash pulmonary edema   >  HFpEF  > Tropenemia, 2/2 TII MI in setting of marked HTN and ESRD   :: home meds: Coreg 50 mg BID, Clonidine 0.2 mg/24 hr patch- 1 patch on skin every 7 days, Hydralazine 100 mg TID , Imdur 120 mg every day , Nifedipine 90 mg every day , Valsartan 320 mg every day   :: TTE 04/2024- LVEF 60-65% with pseudonormal pattern of left ventricular diastolic filling.   :: Renal US 2023 --> Doppler evaluation compatible with history of chronic renal failure on dialysis.   :: Seen by sleep medicine 11/2024 who felt she likely had sleep apnea based on h/p- planning for outpatient sleep study   :: given imdur and hydral in ED   - Weaned off Nicardipine gtt  - Continue with Imdur every day, and coreg 50 BID  - Resumed nifedipine 90 on  11/10   - Can resume other BP meds as above as needed   - Getting HD daily until 11/15   - As per her HFpEF not currently on MRA given renal function, could consider SGLT2i- has not been initiated likely 2/2 ESRD on iHD   - Consider optimizing home BP reg, would favor initiation of Entresto   - serum drug screen negative   - Pending CTA renal artery to assess stenosis as possible etiology of resistant HTN. Pt is allergic to contrast and will need pretreatment        AHRF - resolved    :: Home med Torsemide 40 mg every day on non HD days   :: likely 2/2 flash pulm edema, likely 2/2 fluid redistribution as opposed to fluid overload. Clinical picture not endorsing infection (no fevers/leukocytosis)    - Control BP as above  - Nephro consulted for HD     - Wean O2 as able       ESRD on iHD T/TH/SAT  - c/w home Nephrocaps , Phoslo   - holding home Torsemide 40 mg every day on non HD days   - Nephro rec daily iHD and IUF for 4 days to aid with HTN and fluid overload     HLD   - c/w home statin, aspirin      Peripheral neuropathy   - c/w home gabapentin      COPD without acute exacerbation   - continue home Breo Ellipta 100-25 1 puff daily   - Duoneb q4H scheduled, albuterol q2H PRN     GERD  - c/w home  PPI      Normocytic anemia, likely 2/2 renal disease  - c/w  Epoetin joceline 16836 units three times a week      Hx of LUE DVT  :: per chart review appears that brachial vein DVT in setting of PICC --> provoked   - c/w eliquis for now, suspect can be discontinued as > 6 month duration of therapy has now been completed. Will defer to floor team for final decision      DVT ppx: Eliquis for now   GI ppx: home PPI   Code Status: full code (confirmed on admission)  Surrogate Medical Decision-maker:    Hai Greerose ([facility]) [phone]        30 min      SUBJECTIVE       PT seen and examined today     OBJECTIVE      Visit Vitals  /56   Pulse 89   Temp 36.3 °C (97.3 °F)   Resp 16      No intake or output data in the 24 hours ending 11/17/24 1812      Physical Exam   Constitutional:       Appearance: Normal appearance.   HENT:      Head: Normocephalic and atraumatic.      Mouth/Throat:      Pharynx: Oropharynx is clear.   Cardiovascular:      Rate and Rhythm: Normal rate and regular rhythm.   Pulmonary:      Effort: Pulmonary effort is normal.      Breath sounds: Normal breath sounds.   Abdominal:      General: Abdomen is flat. Bowel sounds are normal.      Palpations: Abdomen is soft.   Musculoskeletal:         General: Normal range of motion.      Right lower leg: No edema.      Left lower leg: No edema.   Skin:     General: Skin is warm and dry.   Neurological:      General: No focal deficit present.      Mental Status: She is alert and oriented to person, place, and time.     Current Meds   apixaban, 5 mg, oral, BID  aspirin, 81 mg, oral, Daily  atorvastatin, 80 mg, oral, Nightly  calcium acetate, 1,334 mg, oral, TID  carvedilol, 50 mg, oral, BID  epoetin joceline-epbx, 10,000 Units, intravenous, Once per day on Monday Wednesday Friday  fluticasone, 2 spray, Each Nostril, Daily  fluticasone furoate-vilanteroL, 1 puff, inhalation, Daily  gabapentin, 300 mg, oral, Nightly  hydrALAZINE, 100 mg, oral,  TID  ipratropium-albuteroL, 3 mL, nebulization, TID after meals  isosorbide mononitrate ER, 120 mg, oral, Daily  NIFEdipine ER, 90 mg, oral, Daily before breakfast  polyethylene glycol, 17 g, oral, Daily  torsemide, 40 mg, oral, Once per day on Sunday Monday Wednesday Friday  vitamin B complex-vitamin C-folic acid, 1 capsule, oral, Daily       PRN medications: acetaminophen, albuterol, guaiFENesin, hydrOXYzine HCL, [DISCONTINUED] ondansetron ODT **OR** ondansetron, oxyCODONE, pantoprazole, prochlorperazine, sodium chloride, traMADol     LABS and IMAGING     WBC   Date Value Ref Range Status   11/17/2024 8.0 4.4 - 11.3 x10*3/uL Final   11/16/2024 9.0 4.4 - 11.3 x10*3/uL Final   11/15/2024 6.9 4.4 - 11.3 x10*3/uL Final     Hemoglobin   Date Value Ref Range Status   11/17/2024 10.8 (L) 12.0 - 16.0 g/dL Final   11/16/2024 9.1 (L) 12.0 - 16.0 g/dL Final   11/15/2024 9.9 (L) 12.0 - 16.0 g/dL Final     Hematocrit   Date Value Ref Range Status   11/17/2024 36.3 36.0 - 46.0 % Final   11/16/2024 30.4 (L) 36.0 - 46.0 % Final   11/15/2024 35.2 (L) 36.0 - 46.0 % Final     Bicarbonate   Date Value Ref Range Status   11/17/2024 27 21 - 32 mmol/L Final   11/16/2024 28 21 - 32 mmol/L Final   11/15/2024 26 21 - 32 mmol/L Final     Creatinine   Date Value Ref Range Status   11/17/2024 6.00 (H) 0.50 - 1.05 mg/dL Final   11/16/2024 7.98 (H) 0.50 - 1.05 mg/dL Final   11/15/2024 5.45 (H) 0.50 - 1.05 mg/dL Final     Calcium   Date Value Ref Range Status   11/17/2024 10.5 8.6 - 10.6 mg/dL Final   11/16/2024 9.6 8.6 - 10.6 mg/dL Final   11/15/2024 10.4 8.6 - 10.6 mg/dL Final       CT angio abdomen pelvis w and or wo IV IV contrast  Narrative: Interpreted By:  Jared Oconnell and Liller Gregory   STUDY:  CT ANGIO ABDOMEN PELVIS W AND/OR WO IV IV CONTRAST;  11/13/2024 1:13  pm      INDICATION:  Signs/Symptoms:Assess renal arteries.      COMPARISON:  CT abdomen pelvis 05/28/2024      ACCESSION NUMBER(S):  [id number]      ORDERING  CLINICIAN:  GODWIN MCCLELLAN      TECHNIQUE:  Thin-section axial images of the abdomen and pelvis were obtained in  the arterial phase after intravenous administration of 90 mL  Omnipaque 350 contrast. Sagittal and coronal reformatted images were  provided. MIP images and 3D reconstructions were created on an  independent workstation and reviewed.      FINDINGS:  VASCULATURE:      ABDOMINAL AORTA: No abdominal aortic aneurysm or dissection. Severe  abdominal aortic atherosclerosis.      ABDOMINAL and PELVIC ARTERIES: Moderate to severe circumferential  atherosclerotic calcification of the majority of the abdominopelvic  arteries as can be seen as sequela of longstanding diabetes. There is  mild stenosis of the right renal artery from atherosclerotic  calcification proximally (series 41, image 128). Contrast is seen  distally indicating patency. There is a single right renal artery.  There is a single left renal artery with mild ostial stenosis from  atherosclerotic calcification proximally (series 41, image 135). No  hemodynamically significant stenosis of the superior mesenteric  artery. Severe ostial stenosis of the inferior mesenteric artery with  contrast seen distally indicating patency. No significant stenosis of  the celiac trunk. However, bulky atherosclerotic calcification  extends into the origin of the right common hepatic artery (series  401, image 114) resulting in at least moderate ostial stenosis          CT ABDOMEN/PELVIS:      LOWER CHEST: Cardiomegaly with left ventricular hypertrophy again  noted. Trace left pleural effusion. Atelectasis versus airspace  consolidation noted within the left middle lobe with ground-glass  opacities noted within the left lower lobe. Mosaic attenuation noted  of the bilateral lung bases.      ABDOMINAL WALL: Small fat containing umbilical hernia. There is mild  body wall anasarca.      LIVER: No significant parenchymal abnormality.      BILE DUCTS: There is mild  intra and extrahepatic biliary prominence  likely sequela of reservoir effect due to cholecystectomy.      GALLBLADDER: Surgically absent.      SPLEEN: No significant abnormality. Accessory splenule noted.      PANCREAS: No significant abnormality.      ADRENALS: No significant abnormality.      KIDNEYS, URETERS, BLADDER: Mild bilateral renal cortical atrophy.  Otherwise, the bilateral kidneys enhance symmetrically. There is no  hydroureteronephrosis or obstructing radiopaque stone. Bladder wall  is diffusely thickened.      REPRODUCTIVE ORGANS: Prominent periuterine vasculature noted with  dense calcification.      VESSELS: (See above). No additional significant abnormality.      LYMPH NODES/RETROPERITONEUM: No mesenteric or retroperitoneal  lymphadenopathy by size criteria. There is mild mesenteric edema.      BOWEL/MESENTERY/PERITONEUM: Stomach is unremarkable. The large small  bowel are normal caliber without bowel wall thickening or dilation.  The appendix is surgically absent.      No significant ascites, free air, or fluid loculated collection.          OSSEOUS STRUCTURES: The bones are diffusely sclerotic in appearance  can be seen in the setting of renal osteodystrophy. No suspicious  osseous lesion or acute osseous injury. Minimal discogenic  degenerative changes.      Impression: 1. Moderate to severe circumferential atherosclerotic calcification  of the abdominopelvic arteries with mild ostial stenosis of the right  renal artery and no significant ostial stenosis of the left renal  artery. There is likely component of at least moderate stenosis of  the distal branches of the bilateral renal arteries from the  aforementioned circumferential calcification.  2. Moderate to severe ostial stenosis of the common hepatic artery  from bulky atherosclerotic calcification as described.  3. Moderate to severe ostial stenosis of the inferior mesenteric  artery.  4. Diffuse bladder wall thickening noted. Correlation  with urinalysis  recommended.  5. Partially visualized cardiomegaly and left ventricular hypertrophy  with mosaic attenuation of the bilateral lung bases, mild body wall  anasarca, and small volume left layering pleural effusion most  consistent with CHF exacerbation/fluid overload.  6. Partially visualized ground-glass opacities noted within the left  lower lobe and lingula. Correlation with dedicated CT chest and  concern for infection recommended.  7. Additional findings as above.      I personally reviewed the images/study and I agree with the findings  as stated above by resident physician, Dr. Nate Duran.      MACRO:  None      Signed by: Jared Oconnell 11/15/2024 5:59 AM  Dictation workstation:   [id number]         PROBLEM LISTS      Problem List Items Addressed This Visit       Polyneuropathy due to type 2 diabetes mellitus (Multi)    Relevant Orders    Referral to Home Care    Low back pain    Relevant Orders    Referral to Home Care    Hypertensive emergency    Resistant hypertension    Relevant Orders    Referral to Home Care    Vascular UsSRenal Artery Duplex Complete    ESRD on hemodialysis (Multi)    Relevant Orders    Referral to Home Care    Sleep-related breathing disorder    Relevant Orders    Referral to Home Care    * (Principal) Congestive heart failure, unspecified HF chronicity, unspecified heart failure type - Primary    Relevant Medications    carvedilol (Coreg) tablet 50 mg    isosorbide mononitrate ER (Imdur) 24 hr tablet 120 mg    NIFEdipine ER (Adalat CC) 24 hr tablet 90 mg     Other Visit Diagnoses       Pneumonia due to infectious organism, unspecified laterality, unspecified part of lung        PAD (peripheral artery disease) (CMS-HCC)        Relevant Orders    Vascular US Ankle Brachial Index (DEBORAH) Without Exercise    Vascular UsSRenal Artery Duplex Complete    End stage renal disease (Multi)        Relevant Orders    Vascular UsSRenal Artery Duplex Complete            This  dictation was created with voice recognition software. Although every effort is made to review the dictation as it is transcribed, on occasion spoken words, phrases, names, numbers, punctuation etc can be misinterpreted by the the technology leading to omissions or inappropriate outcomes.     Soniya Ruff MD

## 2024-11-17 NOTE — PROGRESS NOTES
INTERNAL MEDICINE PROGRESS NOTE     BRIEF NARRATIVE      Stacey Heath is a 54 y.o. female on day 7 of admission presenting with Congestive heart failure, unspecified HF chronicity, unspecified heart failure type.    CICU course:   Pt with PMH significant for ESRD 2/2 on dialysis T/Th/Sat via RUE AVF (last complete session 11/07/2024), HTN, HFpEF, COPD, brachial DVT on Eliquis who presented for [facility] ED with a chief complaint of SOB, found to have SBP >200 and briefly required BiPAP in the ED. She was admitted to the CICU for the management of hypertensive emergency with flash pulmonary edema. Suspect 2/2 resistant hypertension vs medication noncompliance. Was treated with Nicardipine gtt and resumed some home BP meds. SBP dropped to 130s so Nicardipine was held. Elevated again to 180s-190s, resumed nifedipine 11/9 . She underwent HD 11/9 11/16  - DEBORAH/TBI and Renal duplex US ordered, need renal velocities to assess the degree of stenosis and if it is contributory to her uncontrolled HTN.   - Follow up in EVLS clinic OP  - N/v resolving      11/15    -Pt is agreeable with [facility] PT/OT/HHA.    -Severe PAD, EVLS team consulted. CTA shows Moderate to severe circumferential atherosclerotic calcification of the abdominopelvic arteries with mild ostial stenosis of the right renal artery and no significant ostial stenosis of the left renal artery. There is likely component of at least moderate stenosis of the distal branches of the bilateral renal arteries from the aforementioned circumferential calcification.  -Pending  duplex US to determine the  degree of stenosis.        ASSESSMENT / PLANS      > HTN emergency with flash pulmonary  edema   > HFpEF  > Tropenemia, 2/2 TII MI in setting of marked HTN and ESRD   :: home meds: Coreg 50 mg BID, Clonidine 0.2 mg/24 hr patch- 1 patch on skin every 7 days, Hydralazine 100 mg TID , Imdur 120 mg every day , Nifedipine 90 mg every day , Valsartan 320 mg every day   :: TTE 04/2024- LVEF 60-65% with pseudonormal pattern of left ventricular diastolic filling.   :: Renal US 2023 --> Doppler evaluation compatible with history of chronic renal failure on dialysis.   :: Seen by sleep medicine 11/2024 who felt she likely had sleep apnea based on h/p- planning for outpatient sleep study   :: given imdur and hydral in ED   - Weaned off Nicardipine gtt  - Continue with Imdur every day, and coreg 50 BID  - Resumed nifedipine 90 on  11/10   - Can resume other BP meds as above as needed   - Getting HD daily until 11/15   - As per her HFpEF not currently on MRA given renal function, could consider SGLT2i- has not been initiated likely 2/2 ESRD on iHD   - Consider optimizing home BP reg, would favor initiation of Entresto   - serum drug screen negative   - Pending CTA renal artery to assess stenosis as possible etiology of resistant HTN. Pt is allergic to contrast and will need pretreatment        AHRF - resolved    :: Home med Torsemide 40 mg every day on non HD days   :: likely 2/2 flash pulm edema, likely 2/2 fluid redistribution as opposed to fluid overload. Clinical picture not endorsing infection (no fevers/leukocytosis)    - Control BP as above  - Nephro consulted for HD     - Wean O2 as able       ESRD on iHD T/TH/SAT  - c/w home Nephrocaps , Phoslo   - holding home Torsemide 40 mg every day on non HD days   - Nephro rec daily iHD and IUF for 4 days to aid with HTN and fluid overload     HLD   - c/w home statin, aspirin      Peripheral neuropathy   - c/w home gabapentin      COPD without acute exacerbation   - continue home Breo Ellipta 100-25 1 puff daily   - Duoneb q4H scheduled, albuterol q2H PRN     GERD  -  c/w home PPI      Normocytic anemia, likely 2/2 renal disease  - c/w  Epoetin joceline 75084 units three times a week      Hx of LUE DVT  :: per chart review appears that brachial vein DVT in setting of PICC --> provoked   - c/w eliquis for now, suspect can be discontinued as > 6 month duration of therapy has now been completed. Will defer to floor team for final decision      DVT ppx: Eliquis for now   GI ppx: home PPI   Code Status: full code (confirmed on admission)  Surrogate Medical Decision-maker:    Hai Pinedo ([facility]) [phone]        30 min      SUBJECTIVE       PT seen and examined today     OBJECTIVE      Visit Vitals  BP (!) 220/91   Pulse 94   Temp 36.3 °C (97.3 °F) (Temporal)   Resp 19        Intake/Output Summary (Last 24 hours) at 11/16/2024 2244  Last data filed at 11/16/2024 1158  Gross per 24 hour   Intake 800 ml   Output 2200 ml   Net -1400 ml       Physical Exam   Constitutional:       Appearance: Normal appearance.   HENT:      Head: Normocephalic and atraumatic.      Mouth/Throat:      Pharynx: Oropharynx is clear.   Cardiovascular:      Rate and Rhythm: Normal rate and regular rhythm.   Pulmonary:      Effort: Pulmonary effort is normal.      Breath sounds: Normal breath sounds.   Abdominal:      General: Abdomen is flat. Bowel sounds are normal.      Palpations: Abdomen is soft.   Musculoskeletal:         General: Normal range of motion.      Right lower leg: No edema.      Left lower leg: No edema.   Skin:     General: Skin is warm and dry.   Neurological:      General: No focal deficit present.      Mental Status: She is alert and oriented to person, place, and time.     Current Meds   apixaban, 5 mg, oral, BID  aspirin, 81 mg, oral, Daily  atorvastatin, 80 mg, oral, Nightly  calcium acetate, 1,334 mg, oral, TID  carvedilol, 50 mg, oral, BID  epoetin joceline-epbx, 10,000 Units, intravenous, Once per day on Monday Wednesday Friday  fluticasone, 2 spray, Each Nostril, Daily  fluticasone  furoate-vilanteroL, 1 puff, inhalation, Daily  gabapentin, 300 mg, oral, Nightly  hydrALAZINE, 100 mg, oral, TID  ipratropium-albuteroL, 3 mL, nebulization, TID after meals  isosorbide mononitrate ER, 120 mg, oral, Daily  NIFEdipine ER, 90 mg, oral, Daily before breakfast  polyethylene glycol, 17 g, oral, Daily  torsemide, 40 mg, oral, Once per day on Sunday Monday Wednesday Friday  vitamin B complex-vitamin C-folic acid, 1 capsule, oral, Daily       PRN medications: acetaminophen, albuterol, guaiFENesin, hydrOXYzine HCL, [DISCONTINUED] ondansetron ODT **OR** ondansetron, oxyCODONE, pantoprazole, prochlorperazine, sodium chloride, traMADol     LABS and IMAGING     WBC   Date Value Ref Range Status   11/16/2024 9.0 4.4 - 11.3 x10*3/uL Final   11/15/2024 6.9 4.4 - 11.3 x10*3/uL Final   11/14/2024 9.0 4.4 - 11.3 x10*3/uL Final     Hemoglobin   Date Value Ref Range Status   11/16/2024 9.1 (L) 12.0 - 16.0 g/dL Final   11/15/2024 9.9 (L) 12.0 - 16.0 g/dL Final   11/14/2024 8.5 (L) 12.0 - 16.0 g/dL Final     Hematocrit   Date Value Ref Range Status   11/16/2024 30.4 (L) 36.0 - 46.0 % Final   11/15/2024 35.2 (L) 36.0 - 46.0 % Final   11/14/2024 28.7 (L) 36.0 - 46.0 % Final     Bicarbonate   Date Value Ref Range Status   11/16/2024 28 21 - 32 mmol/L Final   11/15/2024 26 21 - 32 mmol/L Final   11/14/2024 24 21 - 32 mmol/L Final     Creatinine   Date Value Ref Range Status   11/16/2024 7.98 (H) 0.50 - 1.05 mg/dL Final   11/15/2024 5.45 (H) 0.50 - 1.05 mg/dL Final   11/14/2024 10.08 (H) 0.50 - 1.05 mg/dL Final     Calcium   Date Value Ref Range Status   11/16/2024 9.6 8.6 - 10.6 mg/dL Final   11/15/2024 10.4 8.6 - 10.6 mg/dL Final   11/14/2024 9.7 8.6 - 10.6 mg/dL Final       CT angio abdomen pelvis w and or wo IV IV contrast  Narrative: Interpreted By:  Jared Oconnell and Liller Gregory   STUDY:  CT ANGIO ABDOMEN PELVIS W AND/OR WO IV IV CONTRAST;  11/13/2024 1:13  pm      INDICATION:  Signs/Symptoms:Assess renal arteries.       COMPARISON:  CT abdomen pelvis 05/28/2024      ACCESSION NUMBER(S):  [id number]      ORDERING CLINICIAN:  GODWIN MCCLELLAN      TECHNIQUE:  Thin-section axial images of the abdomen and pelvis were obtained in  the arterial phase after intravenous administration of 90 mL  Omnipaque 350 contrast. Sagittal and coronal reformatted images were  provided. MIP images and 3D reconstructions were created on an  independent workstation and reviewed.      FINDINGS:  VASCULATURE:      ABDOMINAL AORTA: No abdominal aortic aneurysm or dissection. Severe  abdominal aortic atherosclerosis.      ABDOMINAL and PELVIC ARTERIES: Moderate to severe circumferential  atherosclerotic calcification of the majority of the abdominopelvic  arteries as can be seen as sequela of longstanding diabetes. There is  mild stenosis of the right renal artery from atherosclerotic  calcification proximally (series 41, image 128). Contrast is seen  distally indicating patency. There is a single right renal artery.  There is a single left renal artery with mild ostial stenosis from  atherosclerotic calcification proximally (series 41, image 135). No  hemodynamically significant stenosis of the superior mesenteric  artery. Severe ostial stenosis of the inferior mesenteric artery with  contrast seen distally indicating patency. No significant stenosis of  the celiac trunk. However, bulky atherosclerotic calcification  extends into the origin of the right common hepatic artery (series  401, image 114) resulting in at least moderate ostial stenosis          CT ABDOMEN/PELVIS:      LOWER CHEST: Cardiomegaly with left ventricular hypertrophy again  noted. Trace left pleural effusion. Atelectasis versus airspace  consolidation noted within the left middle lobe with ground-glass  opacities noted within the left lower lobe. Mosaic attenuation noted  of the bilateral lung bases.      ABDOMINAL WALL: Small fat containing umbilical hernia. There is mild  body  wall anasarca.      LIVER: No significant parenchymal abnormality.      BILE DUCTS: There is mild intra and extrahepatic biliary prominence  likely sequela of reservoir effect due to cholecystectomy.      GALLBLADDER: Surgically absent.      SPLEEN: No significant abnormality. Accessory splenule noted.      PANCREAS: No significant abnormality.      ADRENALS: No significant abnormality.      KIDNEYS, URETERS, BLADDER: Mild bilateral renal cortical atrophy.  Otherwise, the bilateral kidneys enhance symmetrically. There is no  hydroureteronephrosis or obstructing radiopaque stone. Bladder wall  is diffusely thickened.      REPRODUCTIVE ORGANS: Prominent periuterine vasculature noted with  dense calcification.      VESSELS: (See above). No additional significant abnormality.      LYMPH NODES/RETROPERITONEUM: No mesenteric or retroperitoneal  lymphadenopathy by size criteria. There is mild mesenteric edema.      BOWEL/MESENTERY/PERITONEUM: Stomach is unremarkable. The large small  bowel are normal caliber without bowel wall thickening or dilation.  The appendix is surgically absent.      No significant ascites, free air, or fluid loculated collection.          OSSEOUS STRUCTURES: The bones are diffusely sclerotic in appearance  can be seen in the setting of renal osteodystrophy. No suspicious  osseous lesion or acute osseous injury. Minimal discogenic  degenerative changes.      Impression: 1. Moderate to severe circumferential atherosclerotic calcification  of the abdominopelvic arteries with mild ostial stenosis of the right  renal artery and no significant ostial stenosis of the left renal  artery. There is likely component of at least moderate stenosis of  the distal branches of the bilateral renal arteries from the  aforementioned circumferential calcification.  2. Moderate to severe ostial stenosis of the common hepatic artery  from bulky atherosclerotic calcification as described.  3. Moderate to severe ostial  stenosis of the inferior mesenteric  artery.  4. Diffuse bladder wall thickening noted. Correlation with urinalysis  recommended.  5. Partially visualized cardiomegaly and left ventricular hypertrophy  with mosaic attenuation of the bilateral lung bases, mild body wall  anasarca, and small volume left layering pleural effusion most  consistent with CHF exacerbation/fluid overload.  6. Partially visualized ground-glass opacities noted within the left  lower lobe and lingula. Correlation with dedicated CT chest and  concern for infection recommended.  7. Additional findings as above.      I personally reviewed the images/study and I agree with the findings  as stated above by resident physician, Dr. Nate Duran.      MACRO:  None      Signed by: Jared Oconnell 11/15/2024 5:59 AM  Dictation workstation:   [id number]         PROBLEM LISTS      Problem List Items Addressed This Visit       Polyneuropathy due to type 2 diabetes mellitus (Multi)    Relevant Orders    Referral to Home Care    Low back pain    Relevant Orders    Referral to Home Care    Hypertensive emergency    Resistant hypertension    Relevant Orders    Referral to Home Care    Vascular UsSRenal Artery Duplex Complete    ESRD on hemodialysis (Multi)    Relevant Orders    Referral to Home Care    Sleep-related breathing disorder    Relevant Orders    Referral to Home Care    * (Principal) Congestive heart failure, unspecified HF chronicity, unspecified heart failure type - Primary    Relevant Medications    carvedilol (Coreg) tablet 50 mg    isosorbide mononitrate ER (Imdur) 24 hr tablet 120 mg    NIFEdipine ER (Adalat CC) 24 hr tablet 90 mg     Other Visit Diagnoses       Pneumonia due to infectious organism, unspecified laterality, unspecified part of lung        PAD (peripheral artery disease) (CMS-HCC)        Relevant Orders    Vascular US Ankle Brachial Index (DEBORAH) Without Exercise    Vascular UsSRenal Artery Duplex Complete    End stage renal  disease (Multi)        Relevant Orders    Vascular UsSRenal Artery Duplex Complete            This dictation was created with voice recognition software. Although every effort is made to review the dictation as it is transcribed, on occasion spoken words, phrases, names, numbers, punctuation etc can be misinterpreted by the the technology leading to omissions or inappropriate outcomes.     Soniya Ruff MD

## 2024-11-18 ENCOUNTER — APPOINTMENT (OUTPATIENT)
Dept: DIALYSIS | Facility: HOSPITAL | Age: 54
End: 2024-11-18
Payer: COMMERCIAL

## 2024-11-18 ENCOUNTER — APPOINTMENT (OUTPATIENT)
Dept: VASCULAR MEDICINE | Facility: HOSPITAL | Age: 54
DRG: 280 | End: 2024-11-18
Payer: COMMERCIAL

## 2024-11-18 LAB
ALBUMIN SERPL BCP-MCNC: 3.9 G/DL (ref 3.4–5)
ANION GAP SERPL CALC-SCNC: 18 MMOL/L (ref 10–20)
BUN SERPL-MCNC: 48 MG/DL (ref 6–23)
CALCIUM SERPL-MCNC: 10.2 MG/DL (ref 8.6–10.6)
CHLORIDE SERPL-SCNC: 93 MMOL/L (ref 98–107)
CO2 SERPL-SCNC: 29 MMOL/L (ref 21–32)
CREAT SERPL-MCNC: 8.04 MG/DL (ref 0.5–1.05)
EGFRCR SERPLBLD CKD-EPI 2021: 6 ML/MIN/1.73M*2
ERYTHROCYTE [DISTWIDTH] IN BLOOD BY AUTOMATED COUNT: 18.6 % (ref 11.5–14.5)
GLUCOSE SERPL-MCNC: 142 MG/DL (ref 74–99)
HCT VFR BLD AUTO: 33.1 % (ref 36–46)
HGB BLD-MCNC: 9.8 G/DL (ref 12–16)
MCH RBC QN AUTO: 29.2 PG (ref 26–34)
MCHC RBC AUTO-ENTMCNC: 29.6 G/DL (ref 32–36)
MCV RBC AUTO: 99 FL (ref 80–100)
NRBC BLD-RTO: 0 /100 WBCS (ref 0–0)
PHOSPHATE SERPL-MCNC: 4.2 MG/DL (ref 2.5–4.9)
PLATELET # BLD AUTO: 282 X10*3/UL (ref 150–450)
POTASSIUM SERPL-SCNC: 5.5 MMOL/L (ref 3.5–5.3)
RBC # BLD AUTO: 3.36 X10*6/UL (ref 4–5.2)
SODIUM SERPL-SCNC: 134 MMOL/L (ref 136–145)
WBC # BLD AUTO: 8.1 X10*3/UL (ref 4.4–11.3)

## 2024-11-18 PROCEDURE — 2500000001 HC RX 250 WO HCPCS SELF ADMINISTERED DRUGS (ALT 637 FOR MEDICARE OP): Performed by: STUDENT IN AN ORGANIZED HEALTH CARE EDUCATION/TRAINING PROGRAM

## 2024-11-18 PROCEDURE — 36415 COLL VENOUS BLD VENIPUNCTURE: CPT | Performed by: STUDENT IN AN ORGANIZED HEALTH CARE EDUCATION/TRAINING PROGRAM

## 2024-11-18 PROCEDURE — 93922 UPR/L XTREMITY ART 2 LEVELS: CPT

## 2024-11-18 PROCEDURE — 99232 SBSQ HOSP IP/OBS MODERATE 35: CPT | Performed by: STUDENT IN AN ORGANIZED HEALTH CARE EDUCATION/TRAINING PROGRAM

## 2024-11-18 PROCEDURE — 2500000002 HC RX 250 W HCPCS SELF ADMINISTERED DRUGS (ALT 637 FOR MEDICARE OP, ALT 636 FOR OP/ED): Performed by: STUDENT IN AN ORGANIZED HEALTH CARE EDUCATION/TRAINING PROGRAM

## 2024-11-18 PROCEDURE — 99233 SBSQ HOSP IP/OBS HIGH 50: CPT | Performed by: INTERNAL MEDICINE

## 2024-11-18 PROCEDURE — 94640 AIRWAY INHALATION TREATMENT: CPT

## 2024-11-18 PROCEDURE — 97116 GAIT TRAINING THERAPY: CPT | Mod: GP | Performed by: PHYSICAL THERAPIST

## 2024-11-18 PROCEDURE — 85027 COMPLETE CBC AUTOMATED: CPT | Performed by: STUDENT IN AN ORGANIZED HEALTH CARE EDUCATION/TRAINING PROGRAM

## 2024-11-18 PROCEDURE — 80069 RENAL FUNCTION PANEL: CPT | Performed by: STUDENT IN AN ORGANIZED HEALTH CARE EDUCATION/TRAINING PROGRAM

## 2024-11-18 PROCEDURE — 1100000001 HC PRIVATE ROOM DAILY

## 2024-11-18 PROCEDURE — 99232 SBSQ HOSP IP/OBS MODERATE 35: CPT

## 2024-11-18 PROCEDURE — 93975 VASCULAR STUDY: CPT | Performed by: SURGERY

## 2024-11-18 PROCEDURE — 93922 UPR/L XTREMITY ART 2 LEVELS: CPT | Performed by: SURGERY

## 2024-11-18 PROCEDURE — 93975 VASCULAR STUDY: CPT

## 2024-11-18 PROCEDURE — 8010000001 HC DIALYSIS - HEMODIALYSIS PER DAY

## 2024-11-18 PROCEDURE — 2500000001 HC RX 250 WO HCPCS SELF ADMINISTERED DRUGS (ALT 637 FOR MEDICARE OP)

## 2024-11-18 PROCEDURE — 2500000004 HC RX 250 GENERAL PHARMACY W/ HCPCS (ALT 636 FOR OP/ED): Performed by: STUDENT IN AN ORGANIZED HEALTH CARE EDUCATION/TRAINING PROGRAM

## 2024-11-18 PROCEDURE — 6350000001 HC RX 635 EPOETIN >10,000 UNITS: Mod: JZ | Performed by: NURSE PRACTITIONER

## 2024-11-18 ASSESSMENT — COGNITIVE AND FUNCTIONAL STATUS - GENERAL
STANDING UP FROM CHAIR USING ARMS: A LITTLE
TURNING FROM BACK TO SIDE WHILE IN FLAT BAD: A LITTLE
MOVING TO AND FROM BED TO CHAIR: A LITTLE
WALKING IN HOSPITAL ROOM: A LITTLE
CLIMB 3 TO 5 STEPS WITH RAILING: A LITTLE
MOBILITY SCORE: 19

## 2024-11-18 ASSESSMENT — PAIN SCALES - GENERAL
PAINLEVEL_OUTOF10: 2
PAINLEVEL_OUTOF10: 0 - NO PAIN
PAINLEVEL_OUTOF10: 3
PAINLEVEL_OUTOF10: 6
PAINLEVEL_OUTOF10: 7
PAINLEVEL_OUTOF10: 7

## 2024-11-18 ASSESSMENT — PAIN - FUNCTIONAL ASSESSMENT: PAIN_FUNCTIONAL_ASSESSMENT: 0-10

## 2024-11-18 ASSESSMENT — PAIN DESCRIPTION - ORIENTATION: ORIENTATION: LEFT;LOWER

## 2024-11-18 ASSESSMENT — PAIN DESCRIPTION - LOCATION
LOCATION: ABDOMEN
LOCATION: ABDOMEN

## 2024-11-18 NOTE — PROGRESS NOTES
Recreation Therapy Note    Therapy Session  Visit Type: New visit  Session Start Time: 1610  Session End Time: 1645  Intervention Delivery: In-person  Conflict of Service: None  Number of family members present: 1  Family Present for Session: Spouse/Significant Other  Family Participation: Interactive  Number of staff members present: 1    Pre-assessment  Mood/Affect: Appropriate, Calm  Verbalized Emotional State:  (none)    Treatment  Areas of Focus: Coping, Socialization, Self-expression, Normalization, Stress reduction  Co-Treatment:  (none)  Interruption: No  Patient Fell Asleep at End of Session: No    Post-assessment  Mood/Affect: Appropriate, Calm, Cooperative, Participative  Verbalized Emotional State:  (none verbalized)  Continue Visiting: Yes  Total Session Time (min): 35 minutes    Narrative  Assessment Detail: Patient was resting in bed upon arrival and agreed to engage in a recreational therapy session.  Plan: To encourage the exploration of safe and effective positive leisure coping skills to manage situational stressors.  Intervention: Patient chose to work on a [employer] painting and a suncatcher  Evaluation: Patient was invested and social throughout the session.  Able to complete her projects independently.  Initiated conversation.  Follow-up: Will continue to encourage the exploration of positve leisure coping skills.  Patient Comments: None noted.    54 y.o. female with PMH significant for ESRD 2/2 on dialysis T/Th/Sat via RUE AVF (last complete session 11/07/2024), HTN, HFpEF, COPD, brachial DVT on Eliquis who presented for [facility] ED with a chief complaint of SOB, found to have SBP >200 and briefly required BiPAP in the ED. She is admitted to the CICU for the management of hypertensive emergency with flash pulmonary edema.

## 2024-11-18 NOTE — PROGRESS NOTES
Subjective Data:  Patient seen in am, walking in the hallway with PT. Reports feeling well, no complains SOB, dizziness, lightheadedness or discomfort with ambulation. Awaiting vascular testing: renal US and lower extremities DEBORAH/TBI    Overnight Events:    No events overnight to report     Objective Data:  Last Recorded Vitals:  Vitals:    11/18/24 0600 11/18/24 0605 11/18/24 0856 11/18/24 1340   BP:  168/69 166/80 155/67   Pulse:  79 84 81   Resp:  16  18   Temp:  36.1 °C (97 °F)  36.4 °C (97.5 °F)   TempSrc:    Temporal   SpO2:  95% 95% 94%   Weight: 54.5 kg (120 lb 2.4 oz)      Height:           Last Labs:  CBC - 11/18/2024:  8:33 AM  8.1 9.8 282    33.1      CMP - 11/18/2024:  8:33 AM  10.2 6.8 37 --- 0.5   4.2 3.9 12 90      PTT - 11/9/2024:  8:29 AM  1.3   14.6 38     TROPHS   Date/Time Value Ref Range Status   11/08/2024 09:06 PM 39 0 - 34 ng/L Final   11/08/2024 08:30 PM 41 0 - 34 ng/L Final   11/02/2024 03:12 AM 42 0 - 34 ng/L Final   05/28/2024 02:54 PM 57 0 - 34 ng/L Final   05/28/2024 11:11 AM 60 0 - 34 ng/L Final   04/13/2024 04:39 PM 31 0 - 34 ng/L Final     BNP   Date/Time Value Ref Range Status   11/08/2024 08:30 PM 2,229 0 - 99 pg/mL Final   11/01/2024 10:12 PM 1,679 0 - 99 pg/mL Final     HGBA1C   Date/Time Value Ref Range Status   11/09/2024 03:20 AM 4.7 See comment % Final   06/29/2024 04:53 AM 5.2 see below % Final     LDLCALC   Date/Time Value Ref Range Status   11/09/2024 03:20 AM 54 <=99 mg/dL Final     Comment:                                 Near   Borderline      AGE      Desirable  Optimal    High     High     Very High     0-19 Y     0 - 109     ---    110-129   >/= 130     ----    20-24 Y     0 - 119     ---    120-159   >/= 160     ----      >24 Y     0 -  99   100-129  130-159   160-189     >/=190     06/29/2024 04:53 AM 37 <=99 mg/dL Final     Comment:                                 Near   Borderline      AGE      Desirable  Optimal    High     High     Very High     0-19 Y     0 -  109     ---    110-129   >/= 130     ----    20-24 Y     0 - 119     ---    120-159   >/= 160     ----      >24 Y     0 -  99   100-129  130-159   160-189     >/=190     01/15/2024 09:49 PM 48 <=99 mg/dL Final     Comment:                                 Near   Borderline      AGE      Desirable  Optimal    High     High     Very High     0-19 Y     0 - 109     ---    110-129   >/= 130     ----    20-24 Y     0 - 119     ---    120-159   >/= 160     ----      >24 Y     0 -  99   100-129  130-159   160-189     >/=190       VLDL   Date/Time Value Ref Range Status   11/09/2024 03:20 AM 9 0 - 40 mg/dL Final   06/29/2024 04:53 AM 11 0 - 40 mg/dL Final   01/15/2024 09:49 PM 21 0 - 40 mg/dL Final      Last I/O:  No intake/output data recorded.    Testing reviewed:  Vascular US Ankle Brachial Index (DEBORAH) Without Exercise 11/18/24  PRELIMINARY CONCLUSIONS:  Bilateral Lower PVR: No evidence of arterial occlusive disease bilaterally in the lower extremities at rest.  Right Lower PVR: Right pressures of >220 mmHg suggest no compressibility of vessels and may make absolute Segmental Limb Pressures (SLP) unreliable. Normal digital perfusion noted. Multiphasic flow is noted in the right common femoral artery, right posterior tibial artery and right dorsalis pedis artery. Unable to obtain brachial pressure due to diaylysis access.  Left Lower PVR: Normal digital perfusion noted. Multiphasic flow is noted in the left common femoral artery, left posterior tibial artery and left dorsalis pedis artery.     Imaging & Doppler Findings:     RIGHT Lower PVR                Pressures Ratios  Right Posterior Tibial (Ankle) 256 mmHg  1.57  Right Dorsalis Pedis (Ankle)   181 mmHg  1.11  Right Digit (Great Toe)        158 mmHg  0.97     LEFT Lower PVR                Pressures Ratios  Left Posterior Tibial (Ankle) 256 mmHg  1.57  Left Dorsalis Pedis (Ankle)   199 mmHg  1.22  Left Digit (Great Toe)        169 mmHg  1.04                        Left  Brachial Pressure 163 mmHg    Vascular US Renal Artery Duplex Complete 11/18/24  PRELIMINARY CONCLUSIONS:  Right Renal Artery: The right renal artery demonstrates a high resistive flow pattern. The right kidney appears atrophic. The right renal vein is widely patent. There is abnormal doppler signal suggestive of reduced kidney function. Right mid and distal renal artery not visualized.  Left Renal Artery: The left renal artery demonstrates a high resistive flow pattern. The left renal vein is widely patent. There is abnormal doppler signal suggestive of reduced kidney function. Left renal artery at proximal not visualized.     Additional Findings:  Technically difficult exam due to bowel gas and patient is in end stage renal  disease.        Imaging & Doppler Findings:     AORTA    PSV   Mid  92.5 cm/s        Renal Artery Duplex Right Kidney: 7.8 cm                           Left Kidney: 6.6 cm        Systolic       Diastolic     ARTERY           Systolic       Diastolic        66 cm/s         7 cm/s       Origin            67 cm/s        5 cm/s        79 cm/s         10 cm/s       Prox                                       Mid             69 cm/s        6 cm/s                                     Distal            56 cm/s        16 cm/s                                    Superior           11 cm/s        0 cm/s         8 cm/s         4 cm/s      Inferior                         Right                          Left                          0.8       R/A Ratio            0.7                          0.5    Resistive Index         1.0        Ao Dist Diam 1.29 cm  Mid Ao       93 cm/s    CT angio abdomen pelvis w and or wo IV IV contrast 11/15/24  IMPRESSION:  1. Moderate to severe circumferential atherosclerotic calcification  of the abdominopelvic arteries with mild ostial stenosis of the right  renal artery and no significant ostial stenosis of the left renal  artery. There is likely component of at least  moderate stenosis of  the distal branches of the bilateral renal arteries from the  aforementioned circumferential calcification.  2. Moderate to severe ostial stenosis of the common hepatic artery  from bulky atherosclerotic calcification as described.  3. Moderate to severe ostial stenosis of the inferior mesenteric  artery.  4. Diffuse bladder wall thickening noted. Correlation with urinalysis  recommended.  5. Partially visualized cardiomegaly and left ventricular hypertrophy  with mosaic attenuation of the bilateral lung bases, mild body wall  anasarca, and small volume left layering pleural effusion most  consistent with CHF exacerbation/fluid overload.  6. Partially visualized ground-glass opacities noted within the left  lower lobe and lingula. Correlation with dedicated CT chest and  concern for infection recommended.  7. Additional findings as above.            Inpatient Medications:  Scheduled medications   Medication Dose Route Frequency    apixaban  5 mg oral BID    aspirin  81 mg oral Daily    atorvastatin  80 mg oral Nightly    calcium acetate  1,334 mg oral TID    carvedilol  50 mg oral BID    epoetin joceline-epbx  10,000 Units intravenous Once per day on Monday Wednesday Friday    fluticasone  2 spray Each Nostril Daily    fluticasone furoate-vilanteroL  1 puff inhalation Daily    gabapentin  300 mg oral Nightly    hydrALAZINE  100 mg oral TID    ipratropium-albuteroL  3 mL nebulization TID after meals    isosorbide mononitrate ER  120 mg oral Daily    NIFEdipine ER  90 mg oral Daily before breakfast    polyethylene glycol  17 g oral Daily    torsemide  40 mg oral Once per day on Sunday Monday Wednesday Friday    vitamin B complex-vitamin C-folic acid  1 capsule oral Daily     PRN medications   Medication    acetaminophen    albuterol    guaiFENesin    hydrOXYzine HCL    ondansetron    oxyCODONE    pantoprazole    prochlorperazine    sodium chloride    traMADol     Continuous Medications   Medication  Dose Last Rate       Physical Exam:  Constitutional: NAD, pleasant, cooperative     Head/Neck: Neck supple, No JVD         Respiratory/Thorax: CTAB, thorax symmetric,on RA   Cardiovascular: Regular, rate and rhythm      Skin: Warm and dry , bilateral feet cool and dry/flaky skin             Extremities: YI, No edema, no discoloration, no open sores, bilateral DP/PT palpable, motor and sensation intact bilateral   Neuro: non-focal, awake/alert/oriented x3,   Psychological: Appropriate mood and behavio     Assessment/Plan   Stacey Heath is a 54 y.o. female presenting with to the ED on 11/8/2024 with SOB and hypertensive urgency with SBP > 200 was admitted to CICU for management of hypertensive emergency with flash pulmonary edema. 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   Per chart review, has had numerous admissions for HTN emergency/crisis including 1 already in the month of November and 2 admissions in October with one presentation for iHD and discharge from ED.   Currently on the nursing floor  EVLS consulted for evaluation of resistant HTN and PAD. Review of the CT angio does not suggest significant stenosis of the renal arteries that will suggest her current issue.       #Resistant HTN   #PAD    -Renal US and CT angiogram reviewed with Dr. Reinoso. No intervention needed for renal stenosis as the USG/CTA dont show any high risk features.   Consideration for renal denervation -patient will be scheduled for outpatient follow up with Dr. Snell  -Bilateral Lower PVR with no evidence of arterial occlusive disease in the lower extremities at rest with normal digital perfusion:  DEBORAH/TBI Rt 1.11/0.97, Lt 1.22/1.04  - continue with ASA and statin therapy   -case and images discussed with EVLS Fellow Dr Rose and EVLS Attending Dr Reinoso, will sign off at this time.     Thank you for the consult, If you have a questions or concerns please do  not hesitate to contact us.         Peripheral IV 11/08/24 18 G Left Antecubital (Active)   Site Assessment Clean;Dry;Intact 11/18/24 0852   Dressing Status Clean;Dry;Occlusive 11/18/24 0852   Number of days: 10       Hemodialysis Arteriovenous Fistula 07/01/24 Right Upper arm (Active)   Site Assessment Clean;Dry 11/18/24 0852   Dressing Status Clean;Dry 11/18/24 0852   Number of days: 140       Code Status:  Full Code    I spent 60 minutes in the professional and overall care of this patient.        LILLY Tay-CNP  Endovascular/Limb [facility] Service   Day: [id number]/Haiku/Night [facility] [id number]

## 2024-11-18 NOTE — PROGRESS NOTES
"Nephrology Follow-up Note   Patient ID: Stacey Heath is a 54 y.o. female.   Admitted for :   Chief Complaint   Patient presents with    Shortness of Breath      Evaluation of patient on dialysis at [facility]  [address]  [facility] [id number] on 11/8/2024  Labs and events reviewed. Still with dyspnea - was able to walk on the hallway but got \"tired\" easily   Otherwise feels OK, no c/o CP/F/C/Abd pain  No c/o related to HD - scheduled for isolated UF today     Patient Active Problem List   Diagnosis    Anxiety    Astigmatism    Carotid bruit    Diabetes mellitus type 2, uncomplicated (Multi)    Coronary artery disease involving native coronary artery    Iron deficiency anemia    Migraine    Neuropathy    Transplant    Polyneuropathy due to type 2 diabetes mellitus (Multi)    Nuclear senile cataract of both eyes    Normocytic normochromic anemia    Low back pain    Hidradenitis    Complex dyslipidemia    Chronic heart failure with preserved ejection fraction (HFpEF)    Malnutrition of moderate degree (Multi)    Kidney transplant candidate    Nonrheumatic mitral valve regurgitation    Chronic deep vein thrombosis (DVT) of brachial vein of left upper extremity (Multi)    Acute diastolic CHF (congestive heart failure)    Hypertensive emergency    Resistant hypertension    Congestive heart failure    ESRD on hemodialysis (Multi)    Flash pulmonary edema    Sleep-related breathing disorder    Congestive heart failure, unspecified HF chronicity, unspecified heart failure type       Scheduled medications:  apixaban, 5 mg, oral, BID  aspirin, 81 mg, oral, Daily  atorvastatin, 80 mg, oral, Nightly  calcium acetate, 1,334 mg, oral, TID  carvedilol, 50 mg, oral, BID  epoetin joceline-epbx, 10,000 Units, intravenous, Once per day on Monday Wednesday Friday  fluticasone, 2 spray, Each Nostril, Daily  fluticasone furoate-vilanteroL, 1 puff, inhalation, Daily  gabapentin, 300 mg, oral, " Nightly  hydrALAZINE, 100 mg, oral, TID  ipratropium-albuteroL, 3 mL, nebulization, TID after meals  isosorbide mononitrate ER, 120 mg, oral, Daily  NIFEdipine ER, 90 mg, oral, Daily before breakfast  polyethylene glycol, 17 g, oral, Daily  torsemide, 40 mg, oral, Once per day on Sunday Monday Wednesday Friday  vitamin B complex-vitamin C-folic acid, 1 capsule, oral, Daily         PRN medications: acetaminophen, albuterol, guaiFENesin, hydrOXYzine HCL, [DISCONTINUED] ondansetron ODT **OR** ondansetron, oxyCODONE, pantoprazole, prochlorperazine, sodium chloride, traMADol     Heart Rate:  [79-90]   Temp:  [36.1 °C (97 °F)-36.4 °C (97.5 °F)]   Resp:  [16-18]   BP: (118-168)/(56-80)   Weight:  [54.5 kg (120 lb 2.4 oz)]   SpO2:  [94 %-99 %]    Weight: 55.3 kg (122 lb)   Gen: alert, NAD  HEENT: NC/AT  Neck: supple, no JVD   Pulm: clear ant b/l   CVS: RRR, no rub  Abd: S/NT/ND  LE: no edema , no cyanosis   Dialysis acces:  RUEAVF     Lab Results   Component Value Date    WBC 8.1 11/18/2024    HGB 9.8 (L) 11/18/2024    HCT 33.1 (L) 11/18/2024    MCV 99 11/18/2024     11/18/2024     Lab Results   Component Value Date    GLUCOSE 142 (H) 11/18/2024    CALCIUM 10.2 11/18/2024     (L) 11/18/2024    K 5.5 (H) 11/18/2024    CO2 29 11/18/2024    CL 93 (L) 11/18/2024    BUN 48 (H) 11/18/2024    CREATININE 8.04 (H) 11/18/2024     Results from last 72 hours   Lab Units 11/18/24  0833   ALBUMIN g/dL 3.9   GLUCOSE mg/dL 142*   HEMOGLOBIN g/dL 9.8*   WBC AUTO x10*3/uL 8.1      Results from last 72 hours   Lab Units 11/18/24  0833   SODIUM mmol/L 134*   POTASSIUM mmol/L 5.5*   CO2 mmol/L 29   BUN mg/dL 48*   CREATININE mg/dL 8.04*   PHOSPHORUS mg/dL 4.2   CALCIUM mg/dL 10.2        Assessment   ESRD-HD admitted with hypertension urgency and pulmonary edema in the setting of volume overload   Is volume up on exam today   Labs oK   Plan   Plan UF 2L per submitted orders,   MBD - Phosphorus binder: please discontinue calcium  acetate in the setting of borderline high Ca and presence of PAD to avoid worsening of extensive vascular calcification ; start Sevelamer 800 mg TID AC as phosphate binder   Anemia of CKD: EPO to be given x 3 per week   Access: no issues   BP: high from volume up ; meds to be adjusted once euvolemia achieved - please restrict fluid to less than 1000 ml per day   Renal Diet   Daily renal MVI   Plan hemodialysis tomorrow with more UF as tolerated   Haydee Mosher MD MPH

## 2024-11-18 NOTE — NURSING NOTE
.Report from Sending RN:    Report From: PONCHO Gaitan  Recent Surgery of Procedure: No  Baseline Level of Consciousness (LOC): A&O X3  Oxygen Use: No  Type: room air  Diabetic: No  Last BP Med Given Day of Dialysis: none  Last Pain Med Given: none  Lab Tests to be Obtained with Dialysis: No  Blood Transfusion to be Given During Dialysis: No  Available IV Access: No  Medications to be Administered During Dialysis: No  Continuous IV Infusion Running: No  Restraints on Currently or in the Last 24 Hours: No  Hand-Off Communication: Pt had no acute event this morning, vital signs stable. Not on precaution, all morning medication given  Dialysis Catheter Dressing: AVF  Last Dressing Change: N/A

## 2024-11-18 NOTE — CARE PLAN
The patient's goals for the shift include      The clinical goals for the shift include pt will remain hemodynamically stable throughout shift      Problem: Fall/Injury  Goal: Not fall by end of shift  Outcome: Progressing  Goal: Be free from injury by end of the shift  Outcome: Progressing  Goal: Verbalize understanding of personal risk factors for fall in the hospital  Outcome: Progressing  Goal: Verbalize understanding of risk factor reduction measures to prevent injury from fall in the home  Outcome: Progressing  Goal: Use assistive devices by end of the shift  Outcome: Progressing  Goal: Pace activities to prevent fatigue by end of the shift  Outcome: Progressing     Problem: Pain - Adult  Goal: Verbalizes/displays adequate comfort level or baseline comfort level  Outcome: Progressing     Problem: Safety - Adult  Goal: Free from fall injury  Outcome: Progressing     Problem: Discharge Planning  Goal: Discharge to home or other facility with appropriate resources  Outcome: Progressing     Problem: Chronic Conditions and Co-morbidities  Goal: Patient's chronic conditions and co-morbidity symptoms are monitored and maintained or improved  Outcome: Progressing     Problem: Heart Failure  Goal: Improved gas exchange this shift  Outcome: Progressing  Goal: Improved urinary output this shift  Outcome: Progressing  Goal: Reduction in peripheral edema within 24 hours  Outcome: Progressing  Goal: Report improvement of dyspnea/breathlessness this shift  Outcome: Progressing  Goal: Weight from fluid excess reduced over 2-3 days, then stabilize  Outcome: Progressing  Goal: Increase self care and/or family involvement in 24 hours  Outcome: Progressing     Problem: Diabetes  Goal: Achieve decreasing blood glucose levels by end of shift  Outcome: Progressing  Goal: Increase stability of blood glucose readings by end of shift  Outcome: Progressing  Goal: Decrease in ketones present in urine by end of shift  Outcome:  Progressing  Goal: Maintain electrolyte levels within acceptable range throughout shift  Outcome: Progressing  Goal: Maintain glucose levels >70mg/dl to <250mg/dl throughout shift  Outcome: Progressing  Goal: No changes in neurological exam by end of shift  Outcome: Progressing  Goal: Learn about and adhere to nutrition recommendations by end of shift  Outcome: Progressing  Goal: Vital signs within normal range for age by end of shift  Outcome: Progressing  Goal: Increase self care and/or family involovement by end of shift  Outcome: Progressing  Goal: Receive DSME education by end of shift  Outcome: Progressing

## 2024-11-18 NOTE — PROGRESS NOTES
Physical Therapy    Physical Therapy Treatment    Patient Name: Stacey Heath  MRN: [id number]  Department: [place] [id number]  Room: 2026/2026-A  Today's Date: 11/18/2024  Time Calculation  Start Time: 0915  Stop Time: 0931  Time Calculation (min): 16 min         Assessment/Plan   PT Assessment  PT Assessment Results: Decreased endurance, Impaired balance, Decreased mobility, Pain, Decreased strength  Rehab Prognosis: Good  Barriers to Discharge: none  Strengths: Attitude of self  Barriers to Participation: Comorbidities  End of Session Communication: Bedside nurse  Assessment Comment: Pt. is a 54 yof that presents with impairments including increased L sided abdominal/flank pain, decreased functional strength, decreased activity tolerance/endurance, mildly impaired balance, and increased difficulty with functional mobility compared to baseline level of function. Pt. will benefit from skilled PT intervention while inpatient to address the above deficits and maximize return to PLOF. Anticipate pt will benefit from LOW intensity PT upon discharge.  End of Session Patient Position: On cart  PT Plan  Inpatient/Swing Bed or Outpatient: Inpatient  PT Plan  Treatment/Interventions: Bed mobility, Transfer training, Gait training, Stair training, Balance training, Strengthening, Endurance training, Range of motion, Therapeutic exercise, Therapeutic activity, Home exercise program  PT Plan: Ongoing PT  PT Frequency: 3 times per week  PT Discharge Recommendations: Low intensity level of continued care  Equipment Recommended upon Discharge:  (shower chair and BSC)  PT Recommended Transfer Status: Stand by assist  PT - OK to Discharge: Yes      General Visit Information:   PT  Visit  PT Received On: 11/18/24  General  Reason for Referral: Pt presented to ED with a chief complaint of SOB, found to have SBP >200 and briefly required BiPAP in the ED. Pt was admitted to the CICU for the management of hypertensive emergency with flash  pulmonary edema. 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  Family/Caregiver Present: No  Prior to Session Communication: Bedside nurse  Patient Position Received: Bed, 3 rail up, Alarm off, not on at start of session  Preferred Learning Style: visual, verbal  General Comment: pt willing to participate and very cooperative.    Subjective   Precautions:  Precautions  Medical Precautions: Fall precautions    Vital Signs (Past 2hrs)                 Objective   Pain:  Pain Assessment  Pain Assessment: 0-10  0-10 (Numeric) Pain Score: 0 - No pain  Cognition:  Cognition  Orientation Level: Oriented X4  Coordination:  Movements are Fluid and Coordinated: Yes  Postural Control:  Postural Control  Postural Control: Within Functional Limits  Static Sitting Balance  Static Sitting-Balance Support: Feet supported, No upper extremity supported  Static Sitting-Level of Assistance: Independent  Static Standing Balance  Static Standing-Balance Support: No upper extremity supported  Static Standing-Level of Assistance: Close supervision    Activity Tolerance:  Activity Tolerance  Endurance: Endurance does not limit participation in activity  Activity Tolerance Comments: pt was tired after ambulation  Treatments:  Therapeutic Exercise  Therapeutic Exercise Performed: No (pt's session interupted as transfer cart presented to take to US.)    Therapeutic Activity  Therapeutic Activity Performed: No    Bed Mobility  Bed Mobility: Yes  Bed Mobility 1  Bed Mobility 1: Supine to sitting, Sitting to supine  Level of Assistance 1: Independent    Ambulation/Gait Training  Ambulation/Gait Training Performed: Yes  Ambulation/Gait Training 1  Surface 1: Level tile  Device 1: No device  Assistance 1: Close supervision  Quality of Gait 1: Decreased step length  Comments/Distance (ft) 1: pt ambulated 180 ft x 2 with five min  rest break in between  Transfers  Transfer: Yes  Transfer 1  Transfer From 1: Sit to, Stand to  Transfer to 1: Stand, Sit  Technique 1: Sit to stand, Stand to sit  Transfer Device 1: Walker  Transfer Level of Assistance 1: Close supervision  Trials/Comments 1: completed 3 trials.         Outcome Measures:  [facility] Basic Mobility  Turning from your back to your side while in a flat bed without using bedrails: None  Moving from lying on your back to sitting on the side of a flat bed without using bedrails: A little  Moving to and from bed to chair (including a wheelchair): A little  Standing up from a chair using your arms (e.g. wheelchair or bedside chair): A little  To walk in hospital room: A little  Climbing 3-5 steps with railing: A little  Basic Mobility - Total Score: 19    Education Documentation  Body Mechanics, taught by Lashawn Gonzalez, PT at 11/18/2024 11:19 AM.  Learner: Patient  Readiness: Acceptance  Method: Explanation  Response: Verbalizes Understanding    Home Exercise Program, taught by Lashawn Gonzalez PT at 11/18/2024 11:19 AM.  Learner: Patient  Readiness: Acceptance  Method: Explanation  Response: Verbalizes Understanding    Mobility Training, taught by Lashawn Gonzalez PT at 11/18/2024 11:19 AM.  Learner: Patient  Readiness: Acceptance  Method: Explanation  Response: Verbalizes Understanding    Education Comments  No comments found.        OP EDUCATION:       Encounter Problems       Encounter Problems (Active)       Balance       Patient to demonstrate [place] static and dynamic standing balance without LOB with change of directions, no hesitancy, appropriate JAE and sequencing to complete functional task.  (Progressing)       Start:  11/12/24    Expected End:  11/26/24            Pt will score >/= 24/28 on Tinetti balance assessment to indicate low falls risk.   (Progressing)       Start:  11/12/24    Expected End:  11/26/24               Mobility       STG - Patient will ambulate  >/= 250 ft [place] with LRAD (Progressing)       Start:  11/12/24    Expected End:  11/26/24            Patient to ascend/descend >/= 12 stairs with HR and SBA to demo ability to safely traverse NIYA and within home (Progressing)       Start:  11/12/24    Expected End:  11/26/24            Pt. will tolerate >/= 20 minutes of OOB mobility without seated rest break and VSS to demo improved activity tolerance/endurance.  (Progressing)       Start:  11/12/24    Expected End:  11/26/24               PT Transfers       STG - Patient will perform bed mobility Edelmira with HOB flat and no rails (Progressing)       Start:  11/12/24    Expected End:  11/26/24               Pain - Adult

## 2024-11-18 NOTE — PROGRESS NOTES
INTERNAL MEDICINE PROGRESS NOTE     BRIEF NARRATIVE      Stacey Heath is a 54 y.o. female PMH significant for ESRD 2/2 on dialysis T/Th/Sat via RUE AVF (last complete session 11/07/2024), HTN, HFpEF, COPD, brachial DVT on Eliquis presenting with Resistant HTN,  she was admitted to the CICU for the management of hypertensive emergency with flash pulmonary edema. Suspect 2/2 resistant hypertension. Was treated with Nicardipine gtt and resumed some home BP meds. Pt has numerous admissions for HTN emergency/crisis. Evaluated by sleep medicine 11/2024 who feel she may have component of JAMSHID, Pt states she is complaint with her medications and HD but continues to struggle with elevated BPs and SOB. CT angio abdomen pelvis showed mild to moderate distal branches of bilateral renal arteries. EVLS consulted for evaluation of resistant HTN and PAD. USG and CT angiogram reviewed, no intervention needed per EVLS team as the USG/CTA dont show any high risk features. Bilateral Lower PVR with no evidence of arterial occlusive disease.     - Pt is to have daily HD with HD team. UF today and HD tomorrow   - Eventually, Pt is agreeable with [facility] PT/OT/HHA.          ASSESSMENT / PLANS      #Resistant HTN r/o CHANDAN  #Severe PAD  -CTA shows Moderate to severe circumferential atherosclerotic calcification of the abdominopelvic arteries with mild ostial stenosis of the right renal artery and no significant ostial stenosis of the left renal artery. There is likely component of at least moderate stenosis of the distal branches of the bilateral renal arteries from the aforementioned circumferential calcification.  -EVLS team consulted.    -Follow up in EVLS  clinic OP      #HTN emergency with flash pulmonary edema - resolved  #HFpEF  #AHRF - resolved    #ESRD on iHD T/TH/SAT   #HLD    #Peripheral neuropathy   #COPD without acute exacerbation   #GERD  #Normocytic anemia, likely 2/2 renal disease  #Hx of LUE DVT      DVT ppx: Eliquis    GI ppx: home PPI   Code Status: full code (confirmed on admission)  Surrogate Medical Decision-maker:    Hai Pinedo ([facility]) [phone]      - Pt is to have daily HD with HD team. UF today and HD tomorrow   40 min      SUBJECTIVE       PT seen and examined today, had uneventful night.     OBJECTIVE      Visit Vitals  /80   Pulse 84   Temp 36.1 °C (97 °F)   Resp 16      No intake or output data in the 24 hours ending 11/18/24 1051      Physical Exam   Constitutional:       Appearance: Normal appearance.   HENT:      Head: Normocephalic and atraumatic.      Mouth/Throat:      Pharynx: Oropharynx is clear.   Cardiovascular:      Rate and Rhythm: Normal rate and regular rhythm.   Pulmonary:      Effort: Pulmonary effort is normal.      Breath sounds: Normal breath sounds.   Abdominal:      General: Abdomen is flat. Bowel sounds are normal.      Palpations: Abdomen is soft.   Musculoskeletal:         General: Normal range of motion.      Right lower leg: No edema.      Left lower leg: No edema.   Skin:     General: Skin is warm and dry.   Neurological:      General: No focal deficit present.      Mental Status: She is alert and oriented to person, place, and time.     Current Meds   apixaban, 5 mg, oral, BID  aspirin, 81 mg, oral, Daily  atorvastatin, 80 mg, oral, Nightly  calcium acetate, 1,334 mg, oral, TID  carvedilol, 50 mg, oral, BID  epoetin joceline-epbx, 10,000 Units, intravenous, Once per day on Monday Wednesday Friday  fluticasone, 2 spray, Each Nostril, Daily  fluticasone furoate-vilanteroL, 1 puff, inhalation, Daily  gabapentin, 300 mg, oral, Nightly  hydrALAZINE, 100 mg, oral, TID  ipratropium-albuteroL, 3 mL, nebulization,  TID after meals  isosorbide mononitrate ER, 120 mg, oral, Daily  NIFEdipine ER, 90 mg, oral, Daily before breakfast  polyethylene glycol, 17 g, oral, Daily  torsemide, 40 mg, oral, Once per day on Sunday Monday Wednesday Friday  vitamin B complex-vitamin C-folic acid, 1 capsule, oral, Daily       PRN medications: acetaminophen, albuterol, guaiFENesin, hydrOXYzine HCL, [DISCONTINUED] ondansetron ODT **OR** ondansetron, oxyCODONE, pantoprazole, prochlorperazine, sodium chloride, traMADol     LABS and IMAGING     WBC   Date Value Ref Range Status   11/18/2024 8.1 4.4 - 11.3 x10*3/uL Final   11/17/2024 8.0 4.4 - 11.3 x10*3/uL Final   11/16/2024 9.0 4.4 - 11.3 x10*3/uL Final     Hemoglobin   Date Value Ref Range Status   11/18/2024 9.8 (L) 12.0 - 16.0 g/dL Final   11/17/2024 10.8 (L) 12.0 - 16.0 g/dL Final   11/16/2024 9.1 (L) 12.0 - 16.0 g/dL Final     Hematocrit   Date Value Ref Range Status   11/18/2024 33.1 (L) 36.0 - 46.0 % Final   11/17/2024 36.3 36.0 - 46.0 % Final   11/16/2024 30.4 (L) 36.0 - 46.0 % Final     Bicarbonate   Date Value Ref Range Status   11/18/2024 29 21 - 32 mmol/L Final   11/17/2024 27 21 - 32 mmol/L Final   11/16/2024 28 21 - 32 mmol/L Final     Creatinine   Date Value Ref Range Status   11/18/2024 8.04 (H) 0.50 - 1.05 mg/dL Final   11/17/2024 6.00 (H) 0.50 - 1.05 mg/dL Final   11/16/2024 7.98 (H) 0.50 - 1.05 mg/dL Final     Calcium   Date Value Ref Range Status   11/18/2024 10.2 8.6 - 10.6 mg/dL Final   11/17/2024 10.5 8.6 - 10.6 mg/dL Final   11/16/2024 9.6 8.6 - 10.6 mg/dL Final       Vascular UsSRenal Artery Duplex Complete  Preliminary Cardiology Report              [place] [address]    Tel [phone] and Fax [phone]         Preliminary Vascular Lab Report     VAS US RENAL ARTERY DUPLEX COMPLETE       Patient Name:      EMILEE Cruz Physician:  [id number] Sunny Hatch MD  Study Date:        11/18/2024     Ordering  Physician: [id number] LELIA ROSIE  MRN/PID:           [id number]       Technologist:       Mikayla Reed [facility]  Accession#:        [id number]   Technologist 2:  Date of Birth/Age: 1970       Encounter#:         [id number]  Gender:            F  Admission Status:  Inpatient      Location Performed: [facility]       Diagnosis/ICD: End stage renal disease (ESRD)-N18.6  Indication:    End stage renal disease  Procedure/CPT: 26940 Abdominal Visceral Renal       Patient History: CAD, HTN and Renal Failure. Patient is on kidney transplant                   list.       PRELIMINARY CONCLUSIONS:  Right Renal Artery: The right renal artery demonstrates a high resistive flow pattern. The right kidney appears atrophic. The right renal vein is widely patent. There is abnormal doppler signal suggestive of reduced kidney function. Right mid and distal renal artery not visualized.  Left Renal Artery: The left renal artery demonstrates a high resistive flow pattern. The left renal vein is widely patent. There is abnormal doppler signal suggestive of reduced kidney function. Left renal artery at proximal not visualized.     Additional Findings:  Technically difficult exam due to bowel gas and patient is in end stage renal  disease.       Imaging & Doppler Findings:     AORTA    PSV   Mid  92.5 cm/s       Renal Artery Duplex Right Kidney: 7.8 cm                           Left Kidney: 6.6 cm        Systolic       Diastolic     ARTERY           Systolic       Diastolic        66 cm/s         7 cm/s       Origin            67 cm/s        5 cm/s        79 cm/s         10 cm/s       Prox                                       Mid             69 cm/s        6 cm/s                                     Distal            56 cm/s        16 cm/s                                    Superior           11 cm/s        0 cm/s         8 cm/s         4 cm/s      Inferior                         Right                          Left                           0.8       R/A Ratio            0.7                          0.5    Resistive Index         1.0       Ao Dist Diam 1.29 cm  Mid Ao       93 cm/s            VASCULAR PRELIMINARY REPORT  completed by Mikayla ALSTON on 11/18/2024 at 10:48:31 AM       ** Final **  Vascular US Ankle Brachial Index (DEBORAH) Without Exercise  Preliminary Cardiology Report              [place] [address]    Tel [phone] and Fax [phone]                 Preliminary Vascular Lab Report     [facility] US ANKLE BRACHIAL INDEX (DEBORAH) WITHOUT EXERCISE       Patient Name:      EMILEE Cruz Physician:  [id number] Sunny Hatch MD  Study Date:        11/18/2024     Ordering Physician: [id number] LELIA VELEZ  MRN/PID:           [id number]       Technologist:       Jeannie Barrios RVT  Accession#:        [id number]   Technologist 2:  Date of Birth/Age: 1970       Encounter#:         [id number]  Gender:            F  Admission Status:  Inpatient      Location Performed: [facility]       Diagnosis/ICD: Peripheral vascular disease, unspecified-I73.9  Indication:    Peripheral vascular disease  Procedure/CPT: 99904 Peripheral artery DEBORAH Only       PRELIMINARY CONCLUSIONS:  Bilateral Lower PVR: No evidence of arterial occlusive disease bilaterally in the lower extremities at rest.  Right Lower PVR: Right pressures of >220 mmHg suggest no compressibility of vessels and may make absolute Segmental Limb Pressures (SLP) unreliable. Normal digital perfusion noted. Multiphasic flow is noted in the right common femoral artery, right posterior tibial artery and right dorsalis pedis artery. Unable to obtain brachial pressure due to diaylysis access.  Left Lower PVR: Normal digital perfusion noted. Multiphasic flow is noted in the left common femoral artery, left posterior tibial artery and left dorsalis pedis artery.     Imaging & Doppler Findings:     RIGHT Lower PVR                 Pressures Ratios  Right Posterior Tibial (Ankle) 256 mmHg  1.57  Right Dorsalis Pedis (Ankle)   181 mmHg  1.11  Right Digit (Great Toe)        158 mmHg  0.97          LEFT Lower PVR                Pressures Ratios  Left Posterior Tibial (Ankle) 256 mmHg  1.57  Left Dorsalis Pedis (Ankle)   199 mmHg  1.22  Left Digit (Great Toe)        169 mmHg  1.04                              Left  Brachial Pressure 163 mmHg            VASCULAR PRELIMINARY REPORT  completed by Jeannie Barrios RVT on 11/18/2024 at 10:25:26 AM       ** Final **         PROBLEM LISTS      Problem List Items Addressed This Visit       Polyneuropathy due to type 2 diabetes mellitus (Multi)    Relevant Orders    Referral to Home Care    Low back pain    Relevant Orders    Referral to Home Care    Hypertensive emergency    Resistant hypertension    Relevant Orders    Referral to Home Care    Vascular UsSRenal Artery Duplex Complete (Completed)    ESRD on hemodialysis (Multi)    Relevant Orders    Referral to Home Care    Sleep-related breathing disorder    Relevant Orders    Referral to Home Care    * (Principal) Congestive heart failure, unspecified HF chronicity, unspecified heart failure type - Primary    Relevant Medications    carvedilol (Coreg) tablet 50 mg    isosorbide mononitrate ER (Imdur) 24 hr tablet 120 mg    NIFEdipine ER (Adalat CC) 24 hr tablet 90 mg     Other Visit Diagnoses       Pneumonia due to infectious organism, unspecified laterality, unspecified part of lung        PAD (peripheral artery disease) (CMS-HCC)        Relevant Orders    Vascular US Ankle Brachial Index (DEBORAH) Without Exercise (Completed)    Vascular UsSRenal Artery Duplex Complete (Completed)    End stage renal disease (Multi)        Relevant Orders    Vascular UsSRenal Artery Duplex Complete (Completed)            This dictation was created with voice recognition software. Although every effort is made to review the dictation as it is transcribed, on occasion spoken  words, phrases, names, numbers, punctuation etc can be misinterpreted by the the technology leading to omissions or inappropriate outcomes.     Soniya Ruff MD

## 2024-11-18 NOTE — CARE PLAN
The patient's goals for the shift include      The clinical goals for the shift include Pt will remain HDS    Over the shift, the patient did not make progress toward the following goals. Barriers to progression include . Recommendations to address these barriers include.

## 2024-11-19 ENCOUNTER — DOCUMENTATION (OUTPATIENT)
Dept: HOME HEALTH SERVICES | Facility: HOME HEALTH | Age: 54
End: 2024-11-19
Payer: COMMERCIAL

## 2024-11-19 ENCOUNTER — APPOINTMENT (OUTPATIENT)
Dept: DIALYSIS | Facility: HOSPITAL | Age: 54
End: 2024-11-19
Payer: COMMERCIAL

## 2024-11-19 LAB
ALBUMIN SERPL BCP-MCNC: 3.9 G/DL (ref 3.4–5)
ANION GAP SERPL CALC-SCNC: 22 MMOL/L (ref 10–20)
BUN SERPL-MCNC: 64 MG/DL (ref 6–23)
CALCIUM SERPL-MCNC: 10 MG/DL (ref 8.6–10.6)
CHLORIDE SERPL-SCNC: 92 MMOL/L (ref 98–107)
CO2 SERPL-SCNC: 25 MMOL/L (ref 21–32)
CREAT SERPL-MCNC: 9.82 MG/DL (ref 0.5–1.05)
EGFRCR SERPLBLD CKD-EPI 2021: 4 ML/MIN/1.73M*2
ERYTHROCYTE [DISTWIDTH] IN BLOOD BY AUTOMATED COUNT: 18.5 % (ref 11.5–14.5)
GLUCOSE SERPL-MCNC: 121 MG/DL (ref 74–99)
HCT VFR BLD AUTO: 33.3 % (ref 36–46)
HGB BLD-MCNC: 10.2 G/DL (ref 12–16)
MCH RBC QN AUTO: 29.5 PG (ref 26–34)
MCHC RBC AUTO-ENTMCNC: 30.6 G/DL (ref 32–36)
MCV RBC AUTO: 96 FL (ref 80–100)
NRBC BLD-RTO: 0 /100 WBCS (ref 0–0)
PHOSPHATE SERPL-MCNC: 5.1 MG/DL (ref 2.5–4.9)
PLATELET # BLD AUTO: 277 X10*3/UL (ref 150–450)
POTASSIUM SERPL-SCNC: 6.3 MMOL/L (ref 3.5–5.3)
RBC # BLD AUTO: 3.46 X10*6/UL (ref 4–5.2)
SODIUM SERPL-SCNC: 133 MMOL/L (ref 136–145)
WBC # BLD AUTO: 7.6 X10*3/UL (ref 4.4–11.3)

## 2024-11-19 PROCEDURE — 94640 AIRWAY INHALATION TREATMENT: CPT

## 2024-11-19 PROCEDURE — 84520 ASSAY OF UREA NITROGEN: CPT | Performed by: STUDENT IN AN ORGANIZED HEALTH CARE EDUCATION/TRAINING PROGRAM

## 2024-11-19 PROCEDURE — 85027 COMPLETE CBC AUTOMATED: CPT | Performed by: STUDENT IN AN ORGANIZED HEALTH CARE EDUCATION/TRAINING PROGRAM

## 2024-11-19 PROCEDURE — 99233 SBSQ HOSP IP/OBS HIGH 50: CPT | Performed by: INTERNAL MEDICINE

## 2024-11-19 PROCEDURE — 2500000001 HC RX 250 WO HCPCS SELF ADMINISTERED DRUGS (ALT 637 FOR MEDICARE OP): Performed by: STUDENT IN AN ORGANIZED HEALTH CARE EDUCATION/TRAINING PROGRAM

## 2024-11-19 PROCEDURE — 1100000001 HC PRIVATE ROOM DAILY

## 2024-11-19 PROCEDURE — 8010000001 HC DIALYSIS - HEMODIALYSIS PER DAY

## 2024-11-19 PROCEDURE — 2500000002 HC RX 250 W HCPCS SELF ADMINISTERED DRUGS (ALT 637 FOR MEDICARE OP, ALT 636 FOR OP/ED): Performed by: STUDENT IN AN ORGANIZED HEALTH CARE EDUCATION/TRAINING PROGRAM

## 2024-11-19 PROCEDURE — 36415 COLL VENOUS BLD VENIPUNCTURE: CPT | Performed by: STUDENT IN AN ORGANIZED HEALTH CARE EDUCATION/TRAINING PROGRAM

## 2024-11-19 PROCEDURE — 99232 SBSQ HOSP IP/OBS MODERATE 35: CPT | Performed by: STUDENT IN AN ORGANIZED HEALTH CARE EDUCATION/TRAINING PROGRAM

## 2024-11-19 PROCEDURE — RXMED WILLOW AMBULATORY MEDICATION CHARGE

## 2024-11-19 RX ORDER — CARVEDILOL 25 MG/1
50 TABLET ORAL 2 TIMES DAILY
Qty: 120 TABLET | Refills: 1 | Status: ON HOLD | OUTPATIENT
Start: 2024-11-19 | End: 2025-01-18

## 2024-11-19 ASSESSMENT — PAIN DESCRIPTION - ORIENTATION: ORIENTATION: MID

## 2024-11-19 ASSESSMENT — PAIN - FUNCTIONAL ASSESSMENT
PAIN_FUNCTIONAL_ASSESSMENT: 0-10
PAIN_FUNCTIONAL_ASSESSMENT: NO/DENIES PAIN
PAIN_FUNCTIONAL_ASSESSMENT: 0-10
PAIN_FUNCTIONAL_ASSESSMENT: WONG-BAKER FACES

## 2024-11-19 ASSESSMENT — PAIN SCALES - PAIN ASSESSMENT IN ADVANCED DEMENTIA (PAINAD)
BODYLANGUAGE: NORMAL
BODYLANGUAGE: RELAXED
CONSOLABILITY: NO NEED TO CONSOLE

## 2024-11-19 ASSESSMENT — PAIN SCALES - WONG BAKER
WONGBAKER_NUMERICALRESPONSE: HURTS LITTLE MORE
WONGBAKER_NUMERICALRESPONSE: HURTS LITTLE MORE
WONGBAKER_NUMERICALRESPONSE: NO HURT

## 2024-11-19 ASSESSMENT — PAIN SCALES - GENERAL
PAINLEVEL_OUTOF10: 6
PAINLEVEL_OUTOF10: 6
PAINLEVEL_OUTOF10: 0 - NO PAIN
PAINLEVEL_OUTOF10: 9

## 2024-11-19 ASSESSMENT — PAIN DESCRIPTION - LOCATION: LOCATION: HEAD

## 2024-11-19 NOTE — CARE PLAN
The patient's goals for the shift include      The clinical goals for the shift include pt will remain hemodynamically stable throughout shift      Problem: Fall/Injury  Goal: Not fall by end of shift  Outcome: Progressing  Goal: Be free from injury by end of the shift  Outcome: Progressing  Goal: Verbalize understanding of personal risk factors for fall in the hospital  Outcome: Progressing  Goal: Verbalize understanding of risk factor reduction measures to prevent injury from fall in the home  Outcome: Progressing  Goal: Use assistive devices by end of the shift  Outcome: Progressing  Goal: Pace activities to prevent fatigue by end of the shift  Outcome: Progressing     Problem: Pain - Adult  Goal: Verbalizes/displays adequate comfort level or baseline comfort level  Outcome: Progressing     Problem: Safety - Adult  Goal: Free from fall injury  Outcome: Progressing     Problem: Discharge Planning  Goal: Discharge to home or other facility with appropriate resources  Outcome: Progressing     Problem: Chronic Conditions and Co-morbidities  Goal: Patient's chronic conditions and co-morbidity symptoms are monitored and maintained or improved  Outcome: Progressing     Problem: Heart Failure  Goal: Improved gas exchange this shift  Outcome: Progressing     Problem: Diabetes  Goal: Achieve decreasing blood glucose levels by end of shift  Outcome: Progressing  Goal: Increase stability of blood glucose readings by end of shift  Outcome: Progressing  Goal: Decrease in ketones present in urine by end of shift  Outcome: Progressing  Goal: Maintain electrolyte levels within acceptable range throughout shift  Outcome: Progressing  Goal: Maintain glucose levels >70mg/dl to <250mg/dl throughout shift  Outcome: Progressing  Goal: No changes in neurological exam by end of shift  Outcome: Progressing  Goal: Learn about and adhere to nutrition recommendations by end of shift  Outcome: Progressing  Goal: Vital signs within normal  range for age by end of shift  Outcome: Progressing  Goal: Increase self care and/or family involovement by end of shift  Outcome: Progressing  Goal: Receive DSME education by end of shift  Outcome: Progressing

## 2024-11-19 NOTE — DOCUMENTATION CLARIFICATION NOTE
"    PATIENT:               EMILEE JACKSON  ACCT #:                  [id number]  MRN:                       [id number]  :                       1970  ADMIT DATE:       2024 7:59 PM  DISCH DATE:  RESPONDING PROVIDER #:        [id number]          PROVIDER RESPONSE TEXT:    Pneumonia  ruled out    CDI QUERY TEXT:    Clarification    Instruction:    Based on your assessment of the patient and the clinical information, please provide the requested documentation by clicking on the appropriate radio button and enter any additional information if prompted.    Question: Based on your medical judgment, can you please clarify which of these conditions is the most clinically supported    When answering this query, please exercise your independent professional judgment. The fact that a question is being asked, does not imply that any particular answer is desired or expected.    The patient's clinical indicators include:  Clinical Information: 54 presents with SOB hypertensive emergency with flash pulmonary edema.    Clinical Information: There is conflicting documentation in the medical record which requires clarification.    -The diagnosis of PNA was documented on   ED provider note  by Dr. Sánchez, and PN  on  By Dr. Will    -The diagnosis of PT does not have PNA was documented on  ED provider note  by Dr. Sánchez    Clinical Indicators:  CXR \"Prominent interstitial markings and bibasilar opacities which may  represent mixed interstitial/alveolar edema with bibasilar atelectasis or pneumonia not excluded.\"      ED provider note  by Dr. Sánchez, \" Given cough, shortness of breath and new oxygen requirement, patient was covered with antibiotics for treatment of community-acquired pneumonia. \"  \"patient started to become significantly tachypneic and felt very short of breath despite not having any desaturations.  Patient does not have pneumonia \" \"Impression PNA\"    Treatment: CXR,  02 2L,  " cefTRIAXone (Rocephin) 1 g in dextrose  IV 50 mL once , azithromycin 500 mg in dextrose 5% 250 mL IV    Risk Factors: SOB  Options provided:  -- Pneumonia  -- Pneumonia  ruled out  -- Other - I will add my own diagnosis  -- Refer to Clinical Documentation Reviewer    Query created by: Hazel Dockery on 11/15/2024 11:37 AM      Electronically signed by:  GODWIN MCCLELLAN MD 11/19/2024 12:18 AM

## 2024-11-19 NOTE — NURSING NOTE
Report to Receiving RN:    Report To: Honey ISAAC  Time Report Called: 1900 per secure chat D/T nurse not coming to the phone  Hand-Off Communication: goal decreased for C/O abdominal cramping, treatment ended 10 minutes early D/T hypotension and nausea, 1.7 liters of fluid removed, VSS  Complications During Treatment: Yes, hypotension, cramping, nausea  Ultrafiltration Treatment: Yes  Medications Administered During Dialysis: Yes, tramadol 50 mg po, zofran 4 mg IV  Blood Products Administered During Dialysis: No  Labs Sent During Dialysis: No  Heparin Drip Rate Changes: N/A  Dialysis Catheter Dressing: N/A  Last Dressing Change: N/A

## 2024-11-19 NOTE — PROGRESS NOTES
Nephrology Follow-up Note   Patient ID: Stacey Heath is a 54 y.o. female.   Admitted for :   Chief Complaint   Patient presents with    Shortness of Breath      Evaluation of patient on dialysis at [facility]  [address]  [facility] [id number] on 11/8/2024  Seen and examined during hemodialysis. Labs and events reviewed.      Subjective:   Feels tired, no c/o CP/SOB  No c/o related to HD ; but states her appetite is poor     Patient Active Problem List   Diagnosis    Anxiety    Astigmatism    Carotid bruit    Diabetes mellitus type 2, uncomplicated (Multi)    Coronary artery disease involving native coronary artery    Iron deficiency anemia    Migraine    Neuropathy    Transplant    Polyneuropathy due to type 2 diabetes mellitus (Multi)    Nuclear senile cataract of both eyes    Normocytic normochromic anemia    Low back pain    Hidradenitis    Complex dyslipidemia    Chronic heart failure with preserved ejection fraction (HFpEF)    Malnutrition of moderate degree (Multi)    Kidney transplant candidate    Nonrheumatic mitral valve regurgitation    Chronic deep vein thrombosis (DVT) of brachial vein of left upper extremity (Multi)    Acute diastolic CHF (congestive heart failure)    Hypertensive emergency    Resistant hypertension    Congestive heart failure    ESRD on hemodialysis (Multi)    Flash pulmonary edema    Sleep-related breathing disorder    Congestive heart failure, unspecified HF chronicity, unspecified heart failure type       Scheduled medications:  apixaban, 5 mg, oral, BID  aspirin, 81 mg, oral, Daily  atorvastatin, 80 mg, oral, Nightly  calcium acetate, 1,334 mg, oral, TID  carvedilol, 50 mg, oral, BID  epoetin joceline-epbx, 10,000 Units, intravenous, Once per day on Monday Wednesday Friday  fluticasone, 2 spray, Each Nostril, Daily  fluticasone furoate-vilanteroL, 1 puff, inhalation, Daily  gabapentin, 300 mg, oral, Nightly  hydrALAZINE, 100 mg, oral,  TID  ipratropium-albuteroL, 3 mL, nebulization, TID after meals  isosorbide mononitrate ER, 120 mg, oral, Daily  NIFEdipine ER, 90 mg, oral, Daily before breakfast  polyethylene glycol, 17 g, oral, Daily  torsemide, 40 mg, oral, Once per day on Sunday Monday Wednesday Friday  vitamin B complex-vitamin C-folic acid, 1 capsule, oral, Daily         PRN medications: acetaminophen, albuterol, guaiFENesin, hydrOXYzine HCL, [DISCONTINUED] ondansetron ODT **OR** ondansetron, oxyCODONE, pantoprazole, prochlorperazine, sodium chloride, traMADol     Heart Rate:  [73-91]   Temp:  [35.2 °C (95.4 °F)-36.6 °C (97.9 °F)]   Resp:  [16-18]   BP: (144-159)/(62-69)   Weight:  [53.5 kg (117 lb 15.1 oz)]   SpO2:  [91 %-94 %]    Weight: 55.3 kg (122 lb)   Gen: alert, NAD  HEENT: NC/AT  Neck: supple, no JVD   Pulm: clear ant b/l   CVS: RRR, no rub  Abd: S/NT  LE: no edema , no cyanosis   Neuro: no asterixis   Dialysis acces:  RUEAVF     Lab Results   Component Value Date    WBC 7.6 11/19/2024    HGB 10.2 (L) 11/19/2024    HCT 33.3 (L) 11/19/2024    MCV 96 11/19/2024     11/19/2024     Lab Results   Component Value Date    GLUCOSE 121 (H) 11/19/2024    CALCIUM 10.0 11/19/2024     (L) 11/19/2024    K 6.3 (HH) 11/19/2024    CO2 25 11/19/2024    CL 92 (L) 11/19/2024    BUN 64 (H) 11/19/2024    CREATININE 9.82 (H) 11/19/2024     Results from last 72 hours   Lab Units 11/19/24  0659   ALBUMIN g/dL 3.9   GLUCOSE mg/dL 121*   HEMOGLOBIN g/dL 10.2*   WBC AUTO x10*3/uL 7.6      Results from last 72 hours   Lab Units 11/19/24  0659   SODIUM mmol/L 133*   POTASSIUM mmol/L 6.3*   CO2 mmol/L 25   BUN mg/dL 64*   CREATININE mg/dL 9.82*   PHOSPHORUS mg/dL 5.1*   CALCIUM mg/dL 10.0        Assessment   ESRD-HD admitted with hypertension urgency and pulmonary edema in the setting of volume overload   Clinically improved and getting close to euvolemia; BP 80/60s in HD today ; High K preHD this AM     Plan   Plan HD per submitted orders, UF 1-2L   as tolerated  MBD - Phosphorus binder: recs as per yesterday note: sevelamer is preferred to calcium acetate   Anemia of CKD: EPO to be given x 3 per week   Access: no issues   BP: low during treatment   Renal Diet   Daily renal MVI   Continue 3 x per week hemodialysis --> next HD on Thursday 11/21;   Haydee Mosher MD MPH

## 2024-11-19 NOTE — CARE PLAN
The patient's goals for the shift include      The clinical goals for the shift include Pt will remain HDS      Problem: Fall/Injury  Goal: Not fall by end of shift  Outcome: Progressing  Goal: Be free from injury by end of the shift  Outcome: Progressing  Goal: Verbalize understanding of personal risk factors for fall in the hospital  Outcome: Progressing  Goal: Verbalize understanding of risk factor reduction measures to prevent injury from fall in the home  Outcome: Progressing  Goal: Use assistive devices by end of the shift  Outcome: Progressing  Goal: Pace activities to prevent fatigue by end of the shift  Outcome: Progressing     Problem: Pain - Adult  Goal: Verbalizes/displays adequate comfort level or baseline comfort level  Outcome: Progressing     Problem: Safety - Adult  Goal: Free from fall injury  Outcome: Progressing     Problem: Discharge Planning  Goal: Discharge to home or other facility with appropriate resources  Outcome: Progressing     Problem: Chronic Conditions and Co-morbidities  Goal: Patient's chronic conditions and co-morbidity symptoms are monitored and maintained or improved  Outcome: Progressing     Problem: Heart Failure  Goal: Improved gas exchange this shift  Outcome: Progressing  Goal: Improved urinary output this shift  Outcome: Progressing  Goal: Reduction in peripheral edema within 24 hours  Outcome: Progressing  Goal: Report improvement of dyspnea/breathlessness this shift  Outcome: Progressing  Goal: Weight from fluid excess reduced over 2-3 days, then stabilize  Outcome: Progressing  Goal: Increase self care and/or family involvement in 24 hours  Outcome: Progressing     Problem: Diabetes  Goal: Achieve decreasing blood glucose levels by end of shift  Outcome: Progressing  Goal: Increase stability of blood glucose readings by end of shift  Outcome: Progressing  Goal: Decrease in ketones present in urine by end of shift  Outcome: Progressing  Goal: Maintain electrolyte levels  within acceptable range throughout shift  Outcome: Progressing  Goal: Maintain glucose levels >70mg/dl to <250mg/dl throughout shift  Outcome: Progressing  Goal: No changes in neurological exam by end of shift  Outcome: Progressing  Goal: Learn about and adhere to nutrition recommendations by end of shift  Outcome: Progressing  Goal: Vital signs within normal range for age by end of shift  Outcome: Progressing  Goal: Increase self care and/or family involovement by end of shift  Outcome: Progressing  Goal: Receive DSME education by end of shift  Outcome: Progressing

## 2024-11-19 NOTE — PROGRESS NOTES
Physical Therapy                 Therapy Communication Note    Patient Name: Stacey Heath  MRN: [id number]  Department: [place] [address]  Room: 2026/2026-A  Today's Date: 11/19/2024     Discipline: Physical Therapy    Missed Visit Reason: Missed Visit Reason: Patient refused (Pt reports she is too tired from dialysis. RN aware)    Missed Time: Attempt 1354    Comment:

## 2024-11-19 NOTE — HH CARE COORDINATION
Home Care received a Referral for Nursing, Physical Therapy, Occupational Therapy, and Home Health Aide. We have processed the referral for a Start of Care on 11.20 or 11.21.2024.     If you have any questions or concerns, please feel free to contact us at [phone]. Follow the prompts, enter your five digit zip code, and you will be directed to your care team on CENTL 3.

## 2024-11-19 NOTE — NURSING NOTE
Report from Sending RN:    Report From: satish  Recent Surgery of Procedure: No  Baseline Level of Consciousness (LOC): a/o x 3  Oxygen Use: No  Type: r/a  Diabetic: No  Last BP Med Given Day of Dialysis: none  Last Pain Med Given: none  Lab Tests to be Obtained with Dialysis: Yes- cbc, rfp  Blood Transfusion to be Given During Dialysis: No  Available IV Access: Yes  Medications to be Administered During Dialysis: No  Continuous IV Infusion Running: No  Restraints on Currently or in the Last 24 Hours: No  Hand-Off Communication: full code, vss, no over night issues, pt can come to the dialysis unit for treatment  Dialysis Catheter Dressing: avf  Last Dressing Change: n/a

## 2024-11-19 NOTE — PROGRESS NOTES
11/19/24 1333   Discharge Planning   Expected Discharge Disposition Home H  ([facility])     Per attending, pt medically ready. Per [facility], SOC in 1-2 days. Attending and resource RN aware.   1356-Per attending, FRANTZ now 11/20. [facility] updated HC.

## 2024-11-19 NOTE — PROGRESS NOTES
INTERNAL MEDICINE PROGRESS NOTE     BRIEF NARRATIVE      Stacey Heath is a 54 y.o. female on day 10 of admission presenting with Congestive heart failure, unspecified HF chronicity, unspecified heart failure type.    Pt with PMH significant for ESRD 2/2 on dialysis T/Th/Sat via RUE AVF (last complete session 11/07/2024), HTN, HFpEF, COPD, brachial DVT on Eliquis presenting with resistant HTN,  she was admitted to the CICU for the management of hypertensive emergency with flash pulmonary edema. Was treated with Nicardipine gtt and resumed some home BP meds. Pt has numerous admissions for HTN emergency/crisis. Evaluated by sleep medicine 11/2024 who feel she may have component of JAMSHID, Pt states she is complaint with her medications and HD but continues to struggle with elevated BPs and SOB. CT angio abdomen pelvis showed mild to moderate distal branches of bilateral renal arteries. EVLS consulted for evaluation of resistant HTN and PAD. USG and CT angiogram reviewed, no intervention needed per EVLS team as the USG/CTA dont show any high risk features. Bilateral Lower PVR with no evidence of arterial occlusive disease.   Back to her regular iHD scheduled starting Thursday       ASSESSMENT / PLANS      #Resistant HTN r/o CHANDAN  #Severe PAD  -CTA shows Moderate to severe circumferential atherosclerotic calcification of the abdominopelvic arteries with mild ostial stenosis of the right renal artery and no significant ostial stenosis of the left renal artery. There is likely component of at least moderate stenosis of the distal branches of the bilateral renal arteries from the aforementioned circumferential calcification.  -EVLS team  consulted.    -Follow up in EVLS clinic OP        #HTN emergency with flash pulmonary edema - resolved  #HFpEF  #AHRF - resolved    #ESRD on iHD T/TH/SAT   #HLD    #Peripheral neuropathy   #COPD without acute exacerbation   #GERD  #Normocytic anemia, likely 2/2 renal disease  #Hx of LUE DVT  Resume iHD on regular schedule     DVT ppx: Eliquis    GI ppx: home PPI   Code Status: full code (confirmed on admission)  Surrogate Medical Decision-maker:    Hai Pinedo ([facility]) [phone]       - Pt is to have daily HD with HD team. UF today and HD tomorrow   40 min      SUBJECTIVE       Pt seen and examined, reports significant lethargy after dialysis today.     OBJECTIVE      Visit Vitals  /75   Pulse 92   Temp 35.9 °C (96.6 °F) (Skin)   Resp 16        Intake/Output Summary (Last 24 hours) at 11/19/2024 1233  Last data filed at 11/19/2024 1020  Gross per 24 hour   Intake 2400 ml   Output 3010 ml   Net -610 ml       Physical Exam   Constitutional:       Appearance: Normal appearance.   HENT:      Head: Normocephalic and atraumatic.      Mouth/Throat:      Pharynx: Oropharynx is clear.   Cardiovascular:      Rate and Rhythm: Normal rate and regular rhythm.   Pulmonary:      Effort: Pulmonary effort is normal.      Breath sounds: Normal breath sounds.   Abdominal:      General: Abdomen is flat. Bowel sounds are normal.      Palpations: Abdomen is soft.   Musculoskeletal:         General: Normal range of motion.      Right lower leg: No edema.      Left lower leg: No edema.   Skin:     General: Skin is warm and dry.   Neurological:      General: No focal deficit present.      Mental Status: She is alert and oriented to person, place, and time.     Current Meds   apixaban, 5 mg, oral, BID  aspirin, 81 mg, oral, Daily  atorvastatin, 80 mg, oral, Nightly  calcium acetate, 1,334 mg, oral, TID  carvedilol, 50 mg, oral, BID  epoetin joceline-epbx, 10,000 Units, intravenous, Once per day on Monday Wednesday Friday  fluticasone,  2 spray, Each Nostril, Daily  fluticasone furoate-vilanteroL, 1 puff, inhalation, Daily  gabapentin, 300 mg, oral, Nightly  hydrALAZINE, 100 mg, oral, TID  ipratropium-albuteroL, 3 mL, nebulization, TID after meals  isosorbide mononitrate ER, 120 mg, oral, Daily  NIFEdipine ER, 90 mg, oral, Daily before breakfast  polyethylene glycol, 17 g, oral, Daily  torsemide, 40 mg, oral, Once per day on Sunday Monday Wednesday Friday  vitamin B complex-vitamin C-folic acid, 1 capsule, oral, Daily       PRN medications: acetaminophen, albuterol, guaiFENesin, hydrOXYzine HCL, [DISCONTINUED] ondansetron ODT **OR** ondansetron, oxyCODONE, pantoprazole, prochlorperazine, sodium chloride, traMADol     LABS and IMAGING     WBC   Date Value Ref Range Status   11/19/2024 7.6 4.4 - 11.3 x10*3/uL Final   11/18/2024 8.1 4.4 - 11.3 x10*3/uL Final   11/17/2024 8.0 4.4 - 11.3 x10*3/uL Final     Hemoglobin   Date Value Ref Range Status   11/19/2024 10.2 (L) 12.0 - 16.0 g/dL Final   11/18/2024 9.8 (L) 12.0 - 16.0 g/dL Final   11/17/2024 10.8 (L) 12.0 - 16.0 g/dL Final     Hematocrit   Date Value Ref Range Status   11/19/2024 33.3 (L) 36.0 - 46.0 % Final   11/18/2024 33.1 (L) 36.0 - 46.0 % Final   11/17/2024 36.3 36.0 - 46.0 % Final     Bicarbonate   Date Value Ref Range Status   11/19/2024 25 21 - 32 mmol/L Final   11/18/2024 29 21 - 32 mmol/L Final   11/17/2024 27 21 - 32 mmol/L Final     Creatinine   Date Value Ref Range Status   11/19/2024 9.82 (H) 0.50 - 1.05 mg/dL Final   11/18/2024 8.04 (H) 0.50 - 1.05 mg/dL Final   11/17/2024 6.00 (H) 0.50 - 1.05 mg/dL Final     Calcium   Date Value Ref Range Status   11/19/2024 10.0 8.6 - 10.6 mg/dL Final   11/18/2024 10.2 8.6 - 10.6 mg/dL Final   11/17/2024 10.5 8.6 - 10.6 mg/dL Final         PROBLEM LISTS      Problem List Items Addressed This Visit          Cardiac and Vasculature    Hypertensive emergency    Resistant hypertension    Relevant Medications    carvedilol (Coreg) 25 mg tablet    Other  Relevant Orders    Referral to Home Care    Vascular UsSRenal Artery Duplex Complete (Completed)    * (Principal) Congestive heart failure, unspecified HF chronicity, unspecified heart failure type - Primary    Relevant Medications    carvedilol (Coreg) tablet 50 mg    isosorbide mononitrate ER (Imdur) 24 hr tablet 120 mg    NIFEdipine ER (Adalat CC) 24 hr tablet 90 mg    carvedilol (Coreg) 25 mg tablet       Genitourinary and Reproductive    ESRD on hemodialysis (Multi)    Relevant Orders    Referral to Home Care       Musculoskeletal and Injuries    Low back pain    Relevant Orders    Referral to Home Care       Neuro    Polyneuropathy due to type 2 diabetes mellitus (Multi)    Relevant Orders    Referral to Home Care       Sleep    Sleep-related breathing disorder    Relevant Orders    Referral to Home Care     Other Visit Diagnoses       Pneumonia due to infectious organism, unspecified laterality, unspecified part of lung        PAD (peripheral artery disease) (CMS-[facility])        Relevant Orders    Vascular US Ankle Brachial Index (DEBORAH) Without Exercise (Completed)    Vascular UsSRenal Artery Duplex Complete (Completed)    End stage renal disease (Multi)        Relevant Orders    Vascular UsSRenal Artery Duplex Complete (Completed)            This dictation was created with voice recognition software. Although every effort is made to review the dictation as it is transcribed, on occasion spoken words, phrases, names, numbers, punctuation etc can be misinterpreted by the the technology leading to omissions or inappropriate outcomes.     Chelsey Robbins MD

## 2024-11-19 NOTE — PROGRESS NOTES
Stacey Heath  Age: 54 y.o.  MRN: [id number]  Date: 11/13/2024  Location of service: phone call    Program Details  Medicaid Community Clinical Case Management  Status: Enrolled  Effective Dates: 10/17/2023 - present  Responsible Staff: NAREN Davidson      Goals Reviewed:      Summary:  This writer calls patient twice to confirm our appointment for today. Patient is unable to answer at this time. This writer leaves a voicemail. This writer goes to patient's house for our appointment and patient does not answer the door at this time.    Appointment start time: 1054  Appointment completion time: 1056  Total time spent with patient (in minutes): 2  Non-Billable Time: 2  Billable Time Total: 0    Roberta Gallego RN

## 2024-11-19 NOTE — NURSING NOTE
.Report to Receiving RN:    Report To: PONCHO Gaitan  Time Report Called: [id number]  Hand-Off Communication: Pt treatment terminated, UF off due to low bp in 80's. No fluid remove Post /72 HR 91  Complications During Treatment: Yes, low bp  Ultrafiltration Treatment: No  Medications Administered During Dialysis: No  Blood Products Administered During Dialysis: No  Labs Sent During Dialysis: No  Heparin Drip Rate Changes: No  Dialysis Catheter Dressing: AVF  Last Dressing Change: N/A

## 2024-11-20 ENCOUNTER — DOCUMENTATION (OUTPATIENT)
Dept: BEHAVIORAL HEALTH | Facility: CLINIC | Age: 54
End: 2024-11-20
Payer: COMMERCIAL

## 2024-11-20 ENCOUNTER — DOCUMENTATION (OUTPATIENT)
Dept: PSYCHIATRY | Facility: HOSPITAL | Age: 54
End: 2024-11-20

## 2024-11-20 ENCOUNTER — PHARMACY VISIT (OUTPATIENT)
Dept: PHARMACY | Facility: CLINIC | Age: 54
End: 2024-11-20
Payer: MEDICARE

## 2024-11-20 VITALS
RESPIRATION RATE: 18 BRPM | BODY MASS INDEX: 27.3 KG/M2 | SYSTOLIC BLOOD PRESSURE: 116 MMHG | DIASTOLIC BLOOD PRESSURE: 56 MMHG | HEIGHT: 55 IN | HEART RATE: 78 BPM | WEIGHT: 117.95 LBS | OXYGEN SATURATION: 98 % | TEMPERATURE: 97.5 F

## 2024-11-20 LAB
ALBUMIN SERPL BCP-MCNC: 3.8 G/DL (ref 3.4–5)
ANION GAP SERPL CALC-SCNC: 17 MMOL/L (ref 10–20)
BUN SERPL-MCNC: 42 MG/DL (ref 6–23)
CALCIUM SERPL-MCNC: 9.7 MG/DL (ref 8.6–10.6)
CHLORIDE SERPL-SCNC: 93 MMOL/L (ref 98–107)
CO2 SERPL-SCNC: 29 MMOL/L (ref 21–32)
CREAT SERPL-MCNC: 6.61 MG/DL (ref 0.5–1.05)
EGFRCR SERPLBLD CKD-EPI 2021: 7 ML/MIN/1.73M*2
GLUCOSE SERPL-MCNC: 151 MG/DL (ref 74–99)
PHOSPHATE SERPL-MCNC: 4.4 MG/DL (ref 2.5–4.9)
POTASSIUM SERPL-SCNC: 5.2 MMOL/L (ref 3.5–5.3)
SODIUM SERPL-SCNC: 134 MMOL/L (ref 136–145)

## 2024-11-20 PROCEDURE — 6350000001 HC RX 635 EPOETIN >10,000 UNITS: Mod: JZ | Performed by: NURSE PRACTITIONER

## 2024-11-20 PROCEDURE — 2500000001 HC RX 250 WO HCPCS SELF ADMINISTERED DRUGS (ALT 637 FOR MEDICARE OP): Performed by: STUDENT IN AN ORGANIZED HEALTH CARE EDUCATION/TRAINING PROGRAM

## 2024-11-20 PROCEDURE — 99233 SBSQ HOSP IP/OBS HIGH 50: CPT | Performed by: NURSE PRACTITIONER

## 2024-11-20 PROCEDURE — 2500000001 HC RX 250 WO HCPCS SELF ADMINISTERED DRUGS (ALT 637 FOR MEDICARE OP)

## 2024-11-20 PROCEDURE — 99239 HOSP IP/OBS DSCHRG MGMT >30: CPT | Performed by: STUDENT IN AN ORGANIZED HEALTH CARE EDUCATION/TRAINING PROGRAM

## 2024-11-20 PROCEDURE — 2500000002 HC RX 250 W HCPCS SELF ADMINISTERED DRUGS (ALT 637 FOR MEDICARE OP, ALT 636 FOR OP/ED): Performed by: STUDENT IN AN ORGANIZED HEALTH CARE EDUCATION/TRAINING PROGRAM

## 2024-11-20 PROCEDURE — 36415 COLL VENOUS BLD VENIPUNCTURE: CPT | Performed by: STUDENT IN AN ORGANIZED HEALTH CARE EDUCATION/TRAINING PROGRAM

## 2024-11-20 PROCEDURE — 2500000004 HC RX 250 GENERAL PHARMACY W/ HCPCS (ALT 636 FOR OP/ED): Performed by: STUDENT IN AN ORGANIZED HEALTH CARE EDUCATION/TRAINING PROGRAM

## 2024-11-20 PROCEDURE — 84100 ASSAY OF PHOSPHORUS: CPT | Performed by: STUDENT IN AN ORGANIZED HEALTH CARE EDUCATION/TRAINING PROGRAM

## 2024-11-20 ASSESSMENT — PAIN DESCRIPTION - ORIENTATION: ORIENTATION: MID

## 2024-11-20 ASSESSMENT — COGNITIVE AND FUNCTIONAL STATUS - GENERAL
MOBILITY SCORE: 23
DAILY ACTIVITIY SCORE: 24
CLIMB 3 TO 5 STEPS WITH RAILING: A LITTLE

## 2024-11-20 ASSESSMENT — PAIN SCALES - GENERAL
PAINLEVEL_OUTOF10: 10 - WORST POSSIBLE PAIN
PAINLEVEL_OUTOF10: 6

## 2024-11-20 ASSESSMENT — PAIN DESCRIPTION - LOCATION: LOCATION: HEAD

## 2024-11-20 ASSESSMENT — PAIN SCALES - WONG BAKER: WONGBAKER_NUMERICALRESPONSE: HURTS EVEN MORE

## 2024-11-20 NOTE — DISCHARGE SUMMARY
INTERNAL MEDICINE DISCHARGE SUMMARY             Discharge Diagnosis   Congestive heart failure, unspecified HF chronicity, unspecified heart failure type    Issues Requiring Follow-Up   Resistant HTN, will need close outpatient follow up     Test Results Pending At Discharge     Pending Labs       No current pending labs.          Hospital Course     Mrs. Stacey Heath is a 54 y.o. female with PMH significant for ESRD 2/2 on dialysis T/Th/Sat via RUE AVF (last complete session 11/07/2024), HTN, HFpEF, COPD, brachial DVT on Eliquis who presented for [facility] ED with a chief complaint of SOB, found to have SBP >200 and briefly required BiPAP in the ED. She was admitted to the CICU for the management of hypertensive emergency with flash pulmonary edema. Suspect 2/2 resistant hypertension vs medication noncompliance.  Was treated with Nicardipine gtt and resumed some home BP meds. SBP dropped to 130s so Nicardipine was held. Elevated again to 180s-190s, resumed nifedipine 11/9 . She underwent HD 11/9 . CT angio abdomen pelvis showed mild to moderate distal branches of bilateral renal arteries. EVLS consulted for evaluation of resistant HTN and PAD. USG and CT angiogram reviewed, no intervention needed per EVLS team as the USG/CTA dont show any high risk features. Bilateral Lower PVR with no evidence of arterial occlusive disease.  Pt underwent multiple consecutive days of HD and ultrafiltration.      Pertinent Physical Exam At Time of Discharge     Physical Exam  Constitutional:       Appearance: Normal appearance.   HENT:      Head: Normocephalic and atraumatic.      Mouth/Throat:      Pharynx: Oropharynx is clear.   Cardiovascular:      Rate and Rhythm: Normal rate and regular rhythm.   Pulmonary:      Effort: Pulmonary effort is normal.      Breath sounds: Normal breath sounds.   Abdominal:      General: Abdomen is flat. Bowel sounds are normal.      Palpations: Abdomen  is soft.   Musculoskeletal:         General: Normal range of motion.      Right lower leg: No edema.      Left lower leg: No edema.   Skin:     General: Skin is warm and dry.   Neurological:      General: No focal deficit present.      Mental Status: She is alert and oriented to person, place, and time.   Home Medications        Medication List      CONTINUE taking these medications     * albuterol 90 mcg/actuation inhaler; Commonly known as: ProAir HFA;   Inhale 2 puffs every 4 hours if needed for wheezing or shortness of   breath.   * albuterol 1.25 mg/3 mL nebulizer solution; Take 3 mL (1.25 mg) by   nebulization every 6 hours if needed for wheezing or shortness of breath.   aspirin 81 mg EC tablet; Take 1 tablet (81 mg) by mouth once daily.   atorvastatin 80 mg tablet; Commonly known as: Lipitor; Take 1 tablet (80   mg) by mouth once daily at bedtime.   Breo Ellipta 100-25 mcg/dose inhaler; Generic drug: fluticasone   furoate-vilanteroL; Inhale 1 puff once daily.   calcium acetate 667 mg capsule; Commonly known as: Phoslo; Take 2   capsules (1,334 mg) by mouth 3 times daily (morning, midday, late   afternoon).   carvedilol 25 mg tablet; Commonly known as: Coreg; Take 2 tablets (50   mg) by mouth 2 times a day.   cloNIDine 0.2 mg/24 hr patch; Commonly known as: Catapres-TTS; Apply one   patch on the skin and replace every 7 days, as directed. Do not start   before October 31, 2024.   Eliquis 5 mg tablet; Generic drug: apixaban; Take 1 tablet (5 mg) by   mouth 2 times a day.   epoetin joceline 10,000 unit/mL injection; Commonly known as:   Epogen,Procrit; Inject 1 mL (10,000 Units) under the skin 3 times a week.   fluticasone 50 mcg/actuation nasal spray; Commonly known as: Flonase;   Administer 2 sprays into each nostril once daily. Shake gently. Before   first use, prime pump. After use, clean tip and replace cap.   gabapentin 300 mg capsule; Commonly known as: Neurontin; Take 1 capsule   (300 mg) by mouth once  daily at bedtime.   hydrALAZINE 100 mg tablet; Commonly known as: Apresoline; Take 1 tablet   (100 mg) by mouth 3 times a day.   hydrOXYzine HCL 25 mg tablet; Commonly known as: Atarax; Take 1 tablet   (25 mg) by mouth every 6 hours if needed for anxiety, allergies or   itching.   isosorbide mononitrate  mg 24 hr tablet; Commonly known as: Imdur;   Take 1 tablet (120 mg) by mouth once daily. Do not crush or chew.   NIFEdipine ER 90 mg 24 hr tablet; Commonly known as: Adalat CC; Take 1   tablet (90 mg) by mouth once daily in the morning. Take before meals. Do   not crush, chew, or split.   pantoprazole 40 mg EC tablet; Commonly known as: ProtoNix; Take 1 tablet   (40 mg) by mouth if needed (not regular take it). Do not crush, chew, or   split.   polyethylene glycol 17 gram/dose powder; Commonly known as: Glycolax,   Miralax; Take 17 g (1 scoop dissolved in liquid) by mouth once daily. Do   not start before July 26, 2024.   Renal Caps 1 mg capsule; Generic drug: vitamin B complex-vitamin C-folic   acid; Take 1 capsule by mouth once daily.   SUMAtriptan 25 mg tablet; Commonly known as: Imitrex; Take 1 tablet (25   mg) by mouth 1 time if needed for migraine. May repeat dose once in 2   hours if no relief.  Do not exceed 2 doses in 24 hours.   torsemide 20 mg tablet; Commonly known as: Demadex; Take 2 tablets once   daily on non-dialysis days only. Do not take on dialysis days   valsartan 320 mg tablet; Commonly known as: Diovan; Take 1 tablet (320   mg) by mouth once daily in the evening.  * This list has 2 medication(s) that are the same as other medications   prescribed for you. Read the directions carefully, and ask your doctor or   other care provider to review them with you.     Outpatient Follow-Up     Future Appointments   Date Time Provider Department Center   12/4/2024 12:00 PM Bess Hurst RD [facility]   12/10/2024  1:30 PM Vince Snell DO [id number] [facility]   1/8/2025  3:00 PM Leyla  MD Brian [id number] [facility]   1/17/2025 11:40 AM Judy Alcaraz MD [id number] [facility]   1/27/2025  4:00 PM Makenna Barraza MD [id number] [facility]   3/25/2025  1:20 PM Nate Hyman MD [id number] Academic       Chelsey Robbnis MD

## 2024-11-20 NOTE — PROGRESS NOTES
"Stacey Heath is a 54 y.o. female on day 11 of admission presenting with Congestive heart failure, unspecified HF chronicity, unspecified heart failure type.    Subjective   Pt resting in bed. Denies sob, n/v/d, fever, cough, chills, pain, chest pains, light head, dizziness, constipation        Objective     Physical Exam  Vitals and nursing note reviewed.   Cardiovascular:      Rate and Rhythm: Normal rate and regular rhythm.   Pulmonary:      Comments: Mckinley lung sounds dim with minor crackles noted to rt base  Abdominal:      General: There is distension.      Comments: Non-tender to palpation   Genitourinary:     Comments: States make urine  Musculoskeletal:      Comments: Ble without edema   Skin:     General: Skin is warm and dry.   Neurological:      Mental Status: She is alert and oriented to person, place, and time.   Psychiatric:         Mood and Affect: Mood normal.         Behavior: Behavior normal.         Last Recorded Vitals  Blood pressure (!) 190/77, pulse 88, temperature 36.1 °C (97 °F), temperature source Temporal, resp. rate 17, height 1.397 m (4' 7\"), weight 53.5 kg (117 lb 15.1 oz), SpO2 99%.  Intake/Output last 3 Shifts:  I/O last 3 completed shifts:  In: 1400 (26.2 mL/kg) [I.V.:800 (15 mL/kg); Other:600]  Out: 867 (16.2 mL/kg) [Other:867]  Weight: 53.5 kg     Relevant Results  Scheduled medications  apixaban, 5 mg, oral, BID  aspirin, 81 mg, oral, Daily  atorvastatin, 80 mg, oral, Nightly  calcium acetate, 1,334 mg, oral, TID  carvedilol, 50 mg, oral, BID  epoetin joceline-epbx, 10,000 Units, intravenous, Once per day on Monday Wednesday Friday  fluticasone, 2 spray, Each Nostril, Daily  fluticasone furoate-vilanteroL, 1 puff, inhalation, Daily  gabapentin, 300 mg, oral, Nightly  hydrALAZINE, 100 mg, oral, TID  ipratropium-albuteroL, 3 mL, nebulization, TID after meals  isosorbide mononitrate ER, 120 mg, oral, Daily  NIFEdipine ER, 90 mg, oral, Daily before breakfast  polyethylene glycol, 17 g, " oral, Daily  torsemide, 40 mg, oral, Once per day on Sunday Monday Wednesday Friday  vitamin B complex-vitamin C-folic acid, 1 capsule, oral, Daily      Continuous medications     PRN medications  PRN medications: acetaminophen, albuterol, guaiFENesin, hydrOXYzine HCL, [DISCONTINUED] ondansetron ODT **OR** ondansetron, oxyCODONE, pantoprazole, prochlorperazine, sodium chloride, traMADol   Results for orders placed or performed during the hospital encounter of 11/08/24 (from the past 24 hours)   Renal function panel   Result Value Ref Range    Glucose 151 (H) 74 - 99 mg/dL    Sodium 134 (L) 136 - 145 mmol/L    Potassium 5.2 3.5 - 5.3 mmol/L    Chloride 93 (L) 98 - 107 mmol/L    Bicarbonate 29 21 - 32 mmol/L    Anion Gap 17 10 - 20 mmol/L    Urea Nitrogen 42 (H) 6 - 23 mg/dL    Creatinine 6.61 (H) 0.50 - 1.05 mg/dL    eGFR 7 (L) >60 mL/min/1.73m*2    Calcium 9.7 8.6 - 10.6 mg/dL    Phosphorus 4.4 2.5 - 4.9 mg/dL    Albumin 3.8 3.4 - 5.0 g/dL     *Note: Due to a large number of results and/or encounters for the requested time period, some results have not been displayed. A complete set of results can be found in Results Review.                               Assessment/Plan   Assessment & Plan  Congestive heart failure, unspecified HF chronicity, unspecified heart failure type    Did not alexis Tolerate hemodialysis yesterday with no net fluid loss    Pt became hypotensive with tx at 88/50 at which pt stated she felt hot, dizzy, uf off, 200cc ns given, euvolemic on exam and has stable electrolytes . K+=6.3 with repeat to 5.2     Outpatient Dialysis schedule:   TTS [facility]/Dr Barraza      Access: lt fist- no issues - able to achieve BFR      Anemia of ESRD:  epoetin joceline-epbx (Retacrit) injection 10,000 Units on dialysis days.. current hgb 10.1.. will cont to monitor .. (Mircera 150mcg q2wks op)     CKD-MBD Phosphate Binder:calcium acetate (Phoslo) capsule 1,334 mg tid ac, vitamin B complex-vitamin C-folic acid  (Nephrocaps) capsule 1 capsule daily     Plan HD tomorrow with UF as tolerated     Renal diet      Please obtain daily standing wt (if possible)     Medication to be adjusted for ESRD      Patient to continue regular HD schedule while inpatient and to follow with the outpatient nephrologist at discharge          LILLY Rebollar-CNP

## 2024-11-20 NOTE — PROGRESS NOTES
"Stacey Heath  Age: 54 y.o.  MRN: [id number]  Date: 11/20/2024  Location of service: in community    Program Details  Medicaid Community Clinical Case Management  Status: Enrolled  Effective Dates: 10/17/2023 - present  Responsible Staff: NAREN Davidson      Goals Reviewed:      Summary:  This writer meets with patient in the hospital for a community visit.  Patient states she is getting discharged today around 4pm.   Patient states she bottomed out yesterday during dialysis even though she didn't take any BP medications.   Patient states she is going to switch to peritoneal dialysis because she needs dialysis more frequently and less \"intense.\"  Patient discusses her Thanksgiving plans.  Patient discusses her empathetic tendencies.   Patient discusses her relationships with people in her life.    Appointment start time: 1235  Appointment completion time: 1414  Total time spent with patient (in minutes): 99  Non-Billable Time: 99  Billable Time Total: 0    Rboerta Gallego RN    "

## 2024-11-20 NOTE — PROGRESS NOTES
Stacey Heath  Age: 54 y.o.  MRN: [id number]  Date: 11/20/2024  Location of service: in community    Program Details  Medicaid Community Clinical Case Management  Status: Enrolled  Effective Dates: 10/17/2023 - present  Responsible Staff: NAREN Davidson      Goals Reviewed:    Problem: Kidney Issues       Goal: Improve health to get on kidney transplant list       Priority: High        Problem: Negative Experience, Conflict with, or Distrust of Providers and/or Health System       Goal: Plan to Address Patient Specific Negative Experience, Distrust, or Conflict with Providers and/or Health System       Priority: High        Problem: Risk of Uncoordinated Care       Goal: Care will be Coordinated and Supported by a Multidisciplinary Team of Providers       Priority: High          Summary:  This provider met with patient at [facility] where patient as been admitted for approximately 12 days. This provider listened with empathy as patient spoke about the recent news she learned regarding her health. This provider assisted patient with validating patient's thoughts and feelings. This provider was able to ask the physician's follow up questions as they came to the patient's room preparing the patient for discharge. This provider and patient reviewed patient's care plan. Provider and patient spoke about what revisions can be made to the care plan.                      NAREN Davidson

## 2024-11-20 NOTE — PROGRESS NOTES
11/20/24 1321   Discharge Planning   Expected Discharge Disposition Home H  ([facility])     Per [facility] SOC on 11/23. Will update pt's RN and resource RN.

## 2024-11-20 NOTE — CARE PLAN
The patient's goals for the shift include      The clinical goals for the shift include pt. will remain HDS    Over the shift, the patient did not make progress toward the following goals. Barriers to progression include . Recommendations to address these barriers include .

## 2024-11-24 ENCOUNTER — HOME CARE VISIT (OUTPATIENT)
Dept: HOME HEALTH SERVICES | Facility: HOME HEALTH | Age: 54
End: 2024-11-24
Payer: COMMERCIAL

## 2024-11-24 VITALS
HEART RATE: 71 BPM | DIASTOLIC BLOOD PRESSURE: 98 MMHG | HEIGHT: 55 IN | TEMPERATURE: 98.1 F | BODY MASS INDEX: 26.61 KG/M2 | WEIGHT: 115 LBS | OXYGEN SATURATION: 99 % | RESPIRATION RATE: 18 BRPM | SYSTOLIC BLOOD PRESSURE: 190 MMHG

## 2024-11-24 PROCEDURE — G0299 HHS/HOSPICE OF RN EA 15 MIN: HCPCS

## 2024-11-24 ASSESSMENT — ENCOUNTER SYMPTOMS
PAIN LOCATION - PAIN SEVERITY: 5/10
FATIGUES EASILY: 1
DIZZINESS: 1
PAIN LOCATION: GENERALIZED
CONSTIPATION: 1
APPETITE LEVEL: POOR
LAST BOWEL MOVEMENT: 67168
CHANGE IN APPETITE: DECREASED
MUSCLE WEAKNESS: 1
PAIN SEVERITY GOAL: 0/10
HEADACHES: 1
HYPERTENSION: 1
PERSON REPORTING PAIN: PATIENT
LIMITED RANGE OF MOTION: 1
PAIN: 1
ABDOMINAL PAIN: 1
BLURRED VISION: 1
HYPOTENSION: 1

## 2024-11-24 ASSESSMENT — ACTIVITIES OF DAILY LIVING (ADL)
CURRENT_FUNCTION: ONE PERSON
AMBULATION ASSISTANCE: ONE PERSON
OASIS_M1830: 03
ENTERING_EXITING_HOME: MODERATE ASSIST

## 2024-11-25 ENCOUNTER — APPOINTMENT (OUTPATIENT)
Dept: RADIOLOGY | Facility: HOSPITAL | Age: 54
End: 2024-11-25
Payer: COMMERCIAL

## 2024-11-25 ENCOUNTER — APPOINTMENT (OUTPATIENT)
Dept: URGENT CARE | Age: 54
End: 2024-11-25
Payer: COMMERCIAL

## 2024-11-25 ENCOUNTER — HOSPITAL ENCOUNTER (EMERGENCY)
Facility: HOSPITAL | Age: 54
Discharge: HOME | End: 2024-11-25
Payer: COMMERCIAL

## 2024-11-25 VITALS
BODY MASS INDEX: 26.85 KG/M2 | HEART RATE: 82 BPM | TEMPERATURE: 97.5 F | HEIGHT: 55 IN | OXYGEN SATURATION: 98 % | DIASTOLIC BLOOD PRESSURE: 68 MMHG | RESPIRATION RATE: 16 BRPM | WEIGHT: 116 LBS | SYSTOLIC BLOOD PRESSURE: 109 MMHG

## 2024-11-25 DIAGNOSIS — S93.601A FOOT SPRAIN, RIGHT, INITIAL ENCOUNTER: Primary | ICD-10-CM

## 2024-11-25 DIAGNOSIS — K59.00 CONSTIPATION, UNSPECIFIED CONSTIPATION TYPE: ICD-10-CM

## 2024-11-25 PROCEDURE — 73630 X-RAY EXAM OF FOOT: CPT | Mod: RT

## 2024-11-25 PROCEDURE — 73610 X-RAY EXAM OF ANKLE: CPT | Mod: RT

## 2024-11-25 PROCEDURE — 99284 EMERGENCY DEPT VISIT MOD MDM: CPT | Performed by: PHYSICIAN ASSISTANT

## 2024-11-25 PROCEDURE — 73610 X-RAY EXAM OF ANKLE: CPT | Mod: RIGHT SIDE | Performed by: RADIOLOGY

## 2024-11-25 PROCEDURE — 73630 X-RAY EXAM OF FOOT: CPT | Mod: RIGHT SIDE | Performed by: RADIOLOGY

## 2024-11-25 PROCEDURE — 2500000001 HC RX 250 WO HCPCS SELF ADMINISTERED DRUGS (ALT 637 FOR MEDICARE OP): Performed by: PHYSICIAN ASSISTANT

## 2024-11-25 RX ORDER — ACETAMINOPHEN 500 MG
1000 TABLET ORAL EVERY 8 HOURS PRN
Qty: 30 TABLET | Refills: 0 | Status: ON HOLD | OUTPATIENT
Start: 2024-11-25 | End: 2024-12-05

## 2024-11-25 RX ORDER — DOCUSATE SODIUM 100 MG/1
100 CAPSULE, LIQUID FILLED ORAL EVERY 12 HOURS
Qty: 60 CAPSULE | Refills: 0 | Status: ON HOLD | OUTPATIENT
Start: 2024-11-25 | End: 2024-12-25

## 2024-11-25 RX ORDER — CYCLOBENZAPRINE HCL 10 MG
10 TABLET ORAL 3 TIMES DAILY PRN
Qty: 21 TABLET | Refills: 0 | Status: ON HOLD | OUTPATIENT
Start: 2024-11-25 | End: 2024-12-02

## 2024-11-25 RX ORDER — CYCLOBENZAPRINE HCL 10 MG
10 TABLET ORAL ONCE
Status: COMPLETED | OUTPATIENT
Start: 2024-11-25 | End: 2024-11-25

## 2024-11-25 ASSESSMENT — PAIN DESCRIPTION - LOCATION: LOCATION: LEG

## 2024-11-25 ASSESSMENT — LIFESTYLE VARIABLES
HAVE YOU EVER FELT YOU SHOULD CUT DOWN ON YOUR DRINKING: NO
EVER HAD A DRINK FIRST THING IN THE MORNING TO STEADY YOUR NERVES TO GET RID OF A HANGOVER: NO
TOTAL SCORE: 0
EVER FELT BAD OR GUILTY ABOUT YOUR DRINKING: NO
HAVE PEOPLE ANNOYED YOU BY CRITICIZING YOUR DRINKING: NO

## 2024-11-25 ASSESSMENT — COLUMBIA-SUICIDE SEVERITY RATING SCALE - C-SSRS
2. HAVE YOU ACTUALLY HAD ANY THOUGHTS OF KILLING YOURSELF?: NO
1. IN THE PAST MONTH, HAVE YOU WISHED YOU WERE DEAD OR WISHED YOU COULD GO TO SLEEP AND NOT WAKE UP?: NO
6. HAVE YOU EVER DONE ANYTHING, STARTED TO DO ANYTHING, OR PREPARED TO DO ANYTHING TO END YOUR LIFE?: NO
4. HAVE YOU HAD THESE THOUGHTS AND HAD SOME INTENTION OF ACTING ON THEM?: NO
5. HAVE YOU STARTED TO WORK OUT OR WORKED OUT THE DETAILS OF HOW TO KILL YOURSELF? DO YOU INTEND TO CARRY OUT THIS PLAN?: NO

## 2024-11-25 ASSESSMENT — PAIN SCALES - GENERAL: PAINLEVEL_OUTOF10: 9

## 2024-11-25 ASSESSMENT — PAIN DESCRIPTION - DESCRIPTORS: DESCRIPTORS: CRAMPING

## 2024-11-25 ASSESSMENT — PAIN DESCRIPTION - PAIN TYPE: TYPE: ACUTE PAIN

## 2024-11-25 ASSESSMENT — PAIN - FUNCTIONAL ASSESSMENT: PAIN_FUNCTIONAL_ASSESSMENT: 0-10

## 2024-11-25 ASSESSMENT — PAIN DESCRIPTION - ORIENTATION: ORIENTATION: RIGHT

## 2024-11-25 NOTE — DISCHARGE INSTRUCTIONS
Xray is normal - no broken bones or dislocations.  Rest, Ice, Compress, and Elevate your injury as often as possible.  Referred to orthopedics for followup.

## 2024-11-25 NOTE — ED PROVIDER NOTES
"Emergency Department Encounter  [facility] EMERGENCY MEDICINE    Patient: Stacey Heath  MRN: [id number]  : 1970  Date of Evaluation: 2024  ED Provider: Krista Saeed PA-C      Chief Complaint       Chief Complaint   Patient presents with    Foot Pain     HPI    Stacey Heath is a 53 yo female presents to the ED for right foot pain x1 day. Pt states she was sitting on her foot yesterday and when she got up, her foot hurt and she fell. Denies head trauma and any traumatic injuries -does take Eliquis chronically for h/o DVT. Her foot initially felt better (was able to walk around on it without pain or need For assistive devices) but later in the day it began to hurt. States it \"feels like she dropped a turkey\" on her foot, it's sore and hurts a lot. She states the middle portion of her foot hurts on the top. Cannot fully bear weight and ambulate on affected extremity.  Took some Tylenol with mild relief of pain.  No associated numbness, tingling, weakness or loss of function.  No open wounds or lacerations.  Endorses some swelling without redness and/or increased warmth.    ROS:     Review of Systems  14 systems reviewed and otherwise acutely negative except as in the HPI.    Past History     Past Medical History:   Diagnosis Date    Bacteremia due to methicillin resistant Staphylococcus aureus 2024    Cardiac/pericardial tamponade 2024    CHF (congestive heart failure)     COPD (chronic obstructive pulmonary disease) (Multi)     Coronary artery disease     Disorder of sweat glands 2023    Dry eye syndrome of bilateral lacrimal glands 2017    Dry eyes    ESRD (end stage renal disease) (Multi)     Essential (primary) hypertension 2022    Hypertension    History of acute pancreatitis 2020    History of acute pancreatitis    Low grade squamous intraepithelial lesion (LGSIL) on cervicovaginal cytologic smear 2023    Migraines     History of " migraine    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    Type 2 myocardial infarction (Multi) 01/20/2024     Past Surgical History:   Procedure Laterality Date    APPENDECTOMY  03/07/2017    Appendectomy    CT ABDOMEN ANGIOGRAM W AND/OR WO IV CONTRAST  4/23/2023    CT ABDOMEN ANGIOGRAM W AND/OR WO IV CONTRAST [facility] CT    OTHER SURGICAL HISTORY  03/07/2017    Cystoscopy With Pyeloscopy With Removal Of Calculus    OTHER SURGICAL HISTORY  03/07/2017    Anoscopy For Polyp Removal    OTHER SURGICAL HISTORY  12/08/2021    Arteriovenous fistula creation procedure    OTHER SURGICAL HISTORY  12/08/2021    Dialysis tunneled catheter placement    TUBAL LIGATION  03/07/2017    Tubal Ligation     Social History     Socioeconomic History    Marital status: Single   Tobacco Use    Smoking status: Former     Types: Cigarettes     Passive exposure: Past    Smokeless tobacco: Never   Vaping Use    Vaping status: Never Used   Substance and Sexual Activity    Alcohol use: Not Currently    Drug use: Not Currently     Types: Cocaine, Marijuana    Sexual activity: Defer     Social Drivers of Health     Financial Resource Strain: Low Risk  (11/11/2024)    Overall Financial Resource Strain (CARDIA)     Difficulty of Paying Living Expenses: Not very hard   Recent Concern: Financial Resource Strain - Medium Risk (10/2/2024)    Overall Financial Resource Strain (CARDIA)     Difficulty of Paying Living Expenses: Somewhat hard   Food Insecurity: No Food Insecurity (11/11/2024)    Hunger Vital Sign     Worried About Running Out of Food in the Last Year: Never true     Ran Out of Food in the Last Year: Never true   Transportation Needs: No Transportation Needs (11/24/2024)    OASIS : Transportation     Lack of Transportation (Medical): No     Lack of Transportation (Non-Medical): No     Patient Unable or Declines to Respond: No   Physical Activity: Sufficiently Active (7/22/2024)    Exercise Vital Sign     Days of Exercise per Week: 4  days     Minutes of Exercise per Session: 60 min   Stress: No Stress Concern Present (10/21/2024)    [employer] - Occupational Stress Questionnaire     Feeling of Stress : Only a little   Social Connections: Feeling Socially Integrated (11/24/2024)    OASIS : Social Isolation     Frequency of experiencing loneliness or isolation: Never   Intimate Partner Violence: Not At Risk (11/11/2024)    Humiliation, Afraid, Rape, and Kick questionnaire     Fear of Current or Ex-Partner: No     Emotionally Abused: No     Physically Abused: No     Sexually Abused: No   Housing Stability: Low Risk  (11/11/2024)    Housing Stability Vital Sign     Unable to Pay for Housing in the Last Year: No     Number of Times Moved in the Last Year: 0     Homeless in the Last Year: No       Medications/Allergies     Previous Medications    ALBUTEROL (PROAIR HFA) 90 MCG/ACTUATION INHALER    Inhale 2 puffs every 4 hours if needed for wheezing or shortness of breath.    ALBUTEROL 1.25 MG/3 ML NEBULIZER SOLUTION    Take 3 mL (1.25 mg) by nebulization every 6 hours if needed for wheezing or shortness of breath.    APIXABAN (ELIQUIS) 5 MG TABLET    Take 1 tablet (5 mg) by mouth 2 times a day.    ASPIRIN 81 MG EC TABLET    Take 1 tablet (81 mg) by mouth once daily.    ATORVASTATIN (LIPITOR) 80 MG TABLET    Take 1 tablet (80 mg) by mouth once daily at bedtime.    CALCIUM ACETATE (PHOSLO) 667 MG CAPSULE    Take 2 capsules (1,334 mg) by mouth 3 times daily (morning, midday, late afternoon).    CARVEDILOL (COREG) 25 MG TABLET    Take 2 tablets (50 mg) by mouth 2 times a day.    CLONIDINE (CATAPRES-TTS) 0.2 MG/24 HR PATCH    Apply one patch on the skin and replace every 7 days, as directed. Do not start before October 31, 2024.    EPOETIN KIA (EPOGEN,PROCRIT) 10,000 UNIT/ML INJECTION    Inject 1 mL (10,000 Units) under the skin 3 times a week.    FLUTICASONE (FLONASE) 50 MCG/ACTUATION NASAL SPRAY    Administer 2 sprays  into each nostril once daily. Shake gently. Before first use, prime pump. After use, clean tip and replace cap.    FLUTICASONE FUROATE-VILANTEROL (BREO ELLIPTA) 100-25 MCG/DOSE INHALER    Inhale 1 puff once daily.    GABAPENTIN (NEURONTIN) 300 MG CAPSULE    Take 1 capsule (300 mg) by mouth once daily at bedtime.    HYDRALAZINE (APRESOLINE) 100 MG TABLET    Take 1 tablet (100 mg) by mouth 3 times a day.    HYDROXYZINE HCL (ATARAX) 25 MG TABLET    Take 1 tablet (25 mg) by mouth every 6 hours if needed for anxiety, allergies or itching.    ISOSORBIDE MONONITRATE ER (IMDUR) 120 MG 24 HR TABLET    Take 1 tablet (120 mg) by mouth once daily. Do not crush or chew.    NIFEDIPINE ER (ADALAT CC) 90 MG 24 HR TABLET    Take 1 tablet (90 mg) by mouth once daily in the morning. Take before meals. Do not crush, chew, or split.    PANTOPRAZOLE (PROTONIX) 40 MG EC TABLET    Take 1 tablet (40 mg) by mouth if needed (not regular take it). Do not crush, chew, or split.    SUMATRIPTAN (IMITREX) 25 MG TABLET    Take 1 tablet (25 mg) by mouth 1 time if needed for migraine. May repeat dose once in 2 hours if no relief.  Do not exceed 2 doses in 24 hours.    TORSEMIDE (DEMADEX) 20 MG TABLET    Take 2 tablets once daily on non-dialysis days only. Do not take on dialysis days    VALSARTAN (DIOVAN) 320 MG TABLET    Take 1 tablet (320 mg) by mouth once daily in the evening.    VITAMIN B COMPLEX-VITAMIN C-FOLIC ACID (RENAL CAPS) 1 MG CAPSULE    Take 1 capsule by mouth once daily. Indications: vitamin deficiency     Allergies   Allergen Reactions    Iodine Hives, Itching and Unknown    Bee Pollen Unknown    Bioflavonoids Swelling    Citrus And Derivatives Unknown    Codeine Itching, Hives and Unknown     Tolerates percocet   Tolerates percocet    Tolerates percocet    Flowers Itching    Shellfish Containing Products Swelling     SEAFOOD        Physical Exam       ED Triage Vitals [11/25/24 1027]   Temperature Heart Rate Respirations BP   36.4 °C  "(97.5 °F) 82 16 109/68      Pulse Ox Temp src Heart Rate Source Patient Position   98 % -- Monitor Sitting      BP Location FiO2 (%)     Left arm --         Physical Exam    Physical Exam:     VS: As documented in the triage note and EMR flowsheet from this visit were reviewed.    Appearance: Alert, oriented, cooperative, in no acute distress. Well nourished & well hydrated.    Skin: Atraumatic. Warm, intact and dry.    Neck: Supple.    Pulmonary: Clear bilaterally with good chest wall excursion. No rales, rhonchi or wheezing. No accessory muscle use or stridor.     Cardiac: Normal S1, S2    Musculoskeletal: Spontaneously moving all extremities without limitation. Extremities warm and well-perfused, capillary refill less than 2 seconds. Pulses full and equal. Trace edema to dorsal midfoot with +TTP, no associated erythema or increased warmth.  No fifth metatarsal, malleoli or tenderness.  No proximal tibial tenderness.    Neurological:  Normal sensation, no weakness.    Diagnostics   Radiographs:  XR foot right 3+ views   Final Result   Moderate calcaneal enthesophyte formation. No acute abnormality             MACRO:   None        Signed by: Terry Walker 11/25/2024 11:56 AM   Dictation workstation:   [id number]      XR ankle right 3+ views   Final Result   Moderate calcaneal enthesophyte formation. No acute abnormality             MACRO:   None        Signed by: Terry Walker 11/25/2024 11:56 AM   Dictation workstation:   [id number]          ED Course   Visit Vitals  /68 (BP Location: Left arm, Patient Position: Sitting)   Pulse 82   Temp 36.4 °C (97.5 °F)   Resp 16   Ht 1.397 m (4' 7\")   Wt 52.6 kg (116 lb)   SpO2 98%   BMI 26.96 kg/m²   OB Status Postmenopausal   Smoking Status Former   BSA 1.43 m²     Medications   cyclobenzaprine (Flexeril) tablet 10 mg (10 mg oral Given 11/25/24 1126)       Medical Decision Making     Diagnoses as of 11/25/24 1207   Foot sprain, right, initial encounter "   Constipation, unspecified constipation type   Xrays are normal - no broken bones or dislocations. Rest, Ice, Compress, and Elevate your injury as often as possible. Take pain medications as needed.  Pt requests Rx for stool softener, when she left the hospital the last time they gave her prescription for oxycodone and she states she declined the stool softener.  Has been having small hard bowel movements.  States that she did take some prune juice 2 days ago which did give her relief of constipation.  I instructed the patient that I will give her prescription for Colace to be taken to prevent further constipation in conjunction with her oxycodone at home.  She is given prescriptions for Tylenol and Flexeril as needed for right leg pain.  Also provided with Ace wrap and crutches for comfort with ambulation.  Referred to orthopedics for follow-up      Final Impression      1. Foot sprain, right, initial encounter    2. Constipation, unspecified constipation type          DISPOSITION  Disposition: Discharge  Patient condition is: Stable    Comment: Please note this report has been produced using speech recognition software and may contain errors related to that system including errors in grammar, punctuation, and spelling, as well as words and phrases that may be inappropriate.  If there are any questions or concerns please feel free to contact the dictating provider for clarification.    CHARLIE Brandt PA-C  11/25/24 1211

## 2024-11-26 ENCOUNTER — CLINICAL SUPPORT (OUTPATIENT)
Dept: EMERGENCY MEDICINE | Facility: HOSPITAL | Age: 54
End: 2024-11-26
Payer: COMMERCIAL

## 2024-11-26 ENCOUNTER — APPOINTMENT (OUTPATIENT)
Dept: RADIOLOGY | Facility: HOSPITAL | Age: 54
End: 2024-11-26
Payer: COMMERCIAL

## 2024-11-26 ENCOUNTER — HOME CARE VISIT (OUTPATIENT)
Dept: HOME HEALTH SERVICES | Facility: HOME HEALTH | Age: 54
End: 2024-11-26
Payer: COMMERCIAL

## 2024-11-26 ENCOUNTER — HOSPITAL ENCOUNTER (OUTPATIENT)
Facility: HOSPITAL | Age: 54
Discharge: LEFT BEFORE DISCHARGE | End: 2024-11-27
Attending: EMERGENCY MEDICINE | Admitting: STUDENT IN AN ORGANIZED HEALTH CARE EDUCATION/TRAINING PROGRAM
Payer: COMMERCIAL

## 2024-11-26 DIAGNOSIS — R06.02 SHORTNESS OF BREATH: ICD-10-CM

## 2024-11-26 DIAGNOSIS — K59.00 CONSTIPATION, UNSPECIFIED CONSTIPATION TYPE: ICD-10-CM

## 2024-11-26 DIAGNOSIS — J44.1 COPD EXACERBATION (MULTI): Primary | ICD-10-CM

## 2024-11-26 LAB
ALBUMIN SERPL BCP-MCNC: 4.1 G/DL (ref 3.4–5)
ALP SERPL-CCNC: 146 U/L (ref 33–110)
ALT SERPL W P-5'-P-CCNC: 19 U/L (ref 7–45)
ANION GAP SERPL CALC-SCNC: 23 MMOL/L (ref 10–20)
AST SERPL W P-5'-P-CCNC: 38 U/L (ref 9–39)
BASOPHILS # BLD AUTO: 0.09 X10*3/UL (ref 0–0.1)
BASOPHILS NFR BLD AUTO: 1.3 %
BILIRUB SERPL-MCNC: 0.4 MG/DL (ref 0–1.2)
BNP SERPL-MCNC: 762 PG/ML (ref 0–99)
BUN SERPL-MCNC: 48 MG/DL (ref 6–23)
CALCIUM SERPL-MCNC: 9 MG/DL (ref 8.6–10.6)
CARDIAC TROPONIN I PNL SERPL HS: 65 NG/L (ref 0–34)
CHLORIDE SERPL-SCNC: 105 MMOL/L (ref 98–107)
CO2 SERPL-SCNC: 21 MMOL/L (ref 21–32)
CREAT SERPL-MCNC: 9.95 MG/DL (ref 0.5–1.05)
EGFRCR SERPLBLD CKD-EPI 2021: 4 ML/MIN/1.73M*2
EOSINOPHIL # BLD AUTO: 0.95 X10*3/UL (ref 0–0.7)
EOSINOPHIL NFR BLD AUTO: 13.5 %
ERYTHROCYTE [DISTWIDTH] IN BLOOD BY AUTOMATED COUNT: 18.7 % (ref 11.5–14.5)
GLUCOSE SERPL-MCNC: 122 MG/DL (ref 74–99)
HCT VFR BLD AUTO: 33.5 % (ref 36–46)
HGB BLD-MCNC: 10.4 G/DL (ref 12–16)
IMM GRANULOCYTES # BLD AUTO: 0.08 X10*3/UL (ref 0–0.7)
IMM GRANULOCYTES NFR BLD AUTO: 1.1 % (ref 0–0.9)
LYMPHOCYTES # BLD AUTO: 0.66 X10*3/UL (ref 1.2–4.8)
LYMPHOCYTES NFR BLD AUTO: 9.4 %
MCH RBC QN AUTO: 29.8 PG (ref 26–34)
MCHC RBC AUTO-ENTMCNC: 31 G/DL (ref 32–36)
MCV RBC AUTO: 96 FL (ref 80–100)
MONOCYTES # BLD AUTO: 0.48 X10*3/UL (ref 0.1–1)
MONOCYTES NFR BLD AUTO: 6.8 %
NEUTROPHILS # BLD AUTO: 4.77 X10*3/UL (ref 1.2–7.7)
NEUTROPHILS NFR BLD AUTO: 67.9 %
NRBC BLD-RTO: 0.3 /100 WBCS (ref 0–0)
PLATELET # BLD AUTO: 259 X10*3/UL (ref 150–450)
POTASSIUM SERPL-SCNC: 5.9 MMOL/L (ref 3.5–5.3)
PROT SERPL-MCNC: 7.5 G/DL (ref 6.4–8.2)
RBC # BLD AUTO: 3.49 X10*6/UL (ref 4–5.2)
SODIUM SERPL-SCNC: 143 MMOL/L (ref 136–145)
WBC # BLD AUTO: 7 X10*3/UL (ref 4.4–11.3)

## 2024-11-26 PROCEDURE — 2500000002 HC RX 250 W HCPCS SELF ADMINISTERED DRUGS (ALT 637 FOR MEDICARE OP, ALT 636 FOR OP/ED): Performed by: STUDENT IN AN ORGANIZED HEALTH CARE EDUCATION/TRAINING PROGRAM

## 2024-11-26 PROCEDURE — 2500000004 HC RX 250 GENERAL PHARMACY W/ HCPCS (ALT 636 FOR OP/ED)

## 2024-11-26 PROCEDURE — 84484 ASSAY OF TROPONIN QUANT: CPT | Performed by: EMERGENCY MEDICINE

## 2024-11-26 PROCEDURE — 2500000001 HC RX 250 WO HCPCS SELF ADMINISTERED DRUGS (ALT 637 FOR MEDICARE OP)

## 2024-11-26 PROCEDURE — 99291 CRITICAL CARE FIRST HOUR: CPT | Performed by: EMERGENCY MEDICINE

## 2024-11-26 PROCEDURE — G0152 HHCP-SERV OF OT,EA 15 MIN: HCPCS

## 2024-11-26 PROCEDURE — 71046 X-RAY EXAM CHEST 2 VIEWS: CPT | Performed by: STUDENT IN AN ORGANIZED HEALTH CARE EDUCATION/TRAINING PROGRAM

## 2024-11-26 PROCEDURE — 83880 ASSAY OF NATRIURETIC PEPTIDE: CPT | Performed by: EMERGENCY MEDICINE

## 2024-11-26 PROCEDURE — 36415 COLL VENOUS BLD VENIPUNCTURE: CPT | Performed by: STUDENT IN AN ORGANIZED HEALTH CARE EDUCATION/TRAINING PROGRAM

## 2024-11-26 PROCEDURE — 93005 ELECTROCARDIOGRAM TRACING: CPT

## 2024-11-26 PROCEDURE — 2500000002 HC RX 250 W HCPCS SELF ADMINISTERED DRUGS (ALT 637 FOR MEDICARE OP, ALT 636 FOR OP/ED)

## 2024-11-26 PROCEDURE — 36415 COLL VENOUS BLD VENIPUNCTURE: CPT | Performed by: EMERGENCY MEDICINE

## 2024-11-26 PROCEDURE — 71046 X-RAY EXAM CHEST 2 VIEWS: CPT

## 2024-11-26 PROCEDURE — 96374 THER/PROPH/DIAG INJ IV PUSH: CPT

## 2024-11-26 PROCEDURE — 93010 ELECTROCARDIOGRAM REPORT: CPT | Performed by: EMERGENCY MEDICINE

## 2024-11-26 PROCEDURE — 85025 COMPLETE CBC W/AUTO DIFF WBC: CPT | Performed by: EMERGENCY MEDICINE

## 2024-11-26 PROCEDURE — 83880 ASSAY OF NATRIURETIC PEPTIDE: CPT | Performed by: STUDENT IN AN ORGANIZED HEALTH CARE EDUCATION/TRAINING PROGRAM

## 2024-11-26 PROCEDURE — 85025 COMPLETE CBC W/AUTO DIFF WBC: CPT | Performed by: STUDENT IN AN ORGANIZED HEALTH CARE EDUCATION/TRAINING PROGRAM

## 2024-11-26 PROCEDURE — 80053 COMPREHEN METABOLIC PANEL: CPT | Performed by: EMERGENCY MEDICINE

## 2024-11-26 PROCEDURE — 84132 ASSAY OF SERUM POTASSIUM: CPT

## 2024-11-26 PROCEDURE — 80053 COMPREHEN METABOLIC PANEL: CPT | Performed by: STUDENT IN AN ORGANIZED HEALTH CARE EDUCATION/TRAINING PROGRAM

## 2024-11-26 PROCEDURE — 84484 ASSAY OF TROPONIN QUANT: CPT | Performed by: STUDENT IN AN ORGANIZED HEALTH CARE EDUCATION/TRAINING PROGRAM

## 2024-11-26 RX ORDER — IPRATROPIUM BROMIDE AND ALBUTEROL SULFATE 2.5; .5 MG/3ML; MG/3ML
3 SOLUTION RESPIRATORY (INHALATION) EVERY 20 MIN
Status: COMPLETED | OUTPATIENT
Start: 2024-11-26 | End: 2024-11-26

## 2024-11-26 RX ORDER — PREDNISONE 20 MG/1
40 TABLET ORAL ONCE
Status: COMPLETED | OUTPATIENT
Start: 2024-11-26 | End: 2024-11-26

## 2024-11-26 RX ORDER — METHOCARBAMOL 500 MG/1
1000 TABLET, FILM COATED ORAL ONCE
Status: COMPLETED | OUTPATIENT
Start: 2024-11-26 | End: 2024-11-26

## 2024-11-26 RX ORDER — IPRATROPIUM BROMIDE AND ALBUTEROL SULFATE 2.5; .5 MG/3ML; MG/3ML
SOLUTION RESPIRATORY (INHALATION)
Status: COMPLETED
Start: 2024-11-26 | End: 2024-11-26

## 2024-11-26 RX ORDER — FUROSEMIDE 10 MG/ML
40 INJECTION INTRAMUSCULAR; INTRAVENOUS ONCE
Status: COMPLETED | OUTPATIENT
Start: 2024-11-26 | End: 2024-11-26

## 2024-11-26 ASSESSMENT — ENCOUNTER SYMPTOMS
PAIN LOCATION - PAIN SEVERITY: 5/10
PAIN LOCATION - PAIN FREQUENCY: CONSTANT
PAIN LOCATION: LEFT SHOULDER
PAIN: 1
PAIN LOCATION: RIGHT SHOULDER
SUBJECTIVE PAIN PROGRESSION: WAXING AND WANING
PAIN LOCATION - PAIN QUALITY: ACHE
LOWEST PAIN SEVERITY IN PAST 24 HOURS: 5/10
PERSON REPORTING PAIN: PATIENT
PAIN SEVERITY GOAL: 0/10
PAIN LOCATION: RIGHT FOOT
PAIN LOCATION - PAIN FREQUENCY: INTERMITTENT
PAIN LOCATION - PAIN SEVERITY: 6/10
PAIN LOCATION - PAIN FREQUENCY: INTERMITTENT
HIGHEST PAIN SEVERITY IN PAST 24 HOURS: 9/10
PAIN LOCATION - PAIN QUALITY: ACHE
PAIN LOCATION - PAIN QUALITY: ACHE
PAIN LOCATION - PAIN SEVERITY: 5/10

## 2024-11-26 ASSESSMENT — ACTIVITIES OF DAILY LIVING (ADL)
DRESSING_UB_CURRENT_FUNCTION: STAND BY ASSIST
BATHING_CURRENT_FUNCTION: STAND BY ASSIST
CURRENT_FUNCTION: MINIMUM ASSIST
BATHING ASSESSED: 1
FEEDING ASSESSED: 1
TELEPHONE USE ASSESSED: 1
LAUNDRY ASSESSED: 1
PHYSICAL TRANSFERS ASSESSED: 1
GROOMING ASSESSED: 1
HOUSEKEEPING ASSESSED: 1
ORAL_CARE_CURRENT_FUNCTION: INDEPENDENT
TRANSPORTATION ASSESSED: 1
USING THE TELPHONE: INDEPENDENT
TOILETING: INDEPENDENT
FEEDING: INDEPENDENT
TOILETING: 1
LAUNDRY: DEPENDENT
SHOPPING ASSESSED: 1
ORAL_CARE_ASSESSED: 1
GROOMING_CURRENT_FUNCTION: INDEPENDENT
DRESSING_LB_CURRENT_FUNCTION: STAND BY ASSIST
SHOPPING: DEPENDENT
LIGHT HOUSEKEEPING: DEPENDENT
PREPARING MEALS: NEEDS ASSISTANCE
TRANSPORTATION: DEPENDENT

## 2024-11-26 ASSESSMENT — COLUMBIA-SUICIDE SEVERITY RATING SCALE - C-SSRS
5. HAVE YOU STARTED TO WORK OUT OR WORKED OUT THE DETAILS OF HOW TO KILL YOURSELF? DO YOU INTEND TO CARRY OUT THIS PLAN?: NO
1. IN THE PAST MONTH, HAVE YOU WISHED YOU WERE DEAD OR WISHED YOU COULD GO TO SLEEP AND NOT WAKE UP?: NO
4. HAVE YOU HAD THESE THOUGHTS AND HAD SOME INTENTION OF ACTING ON THEM?: NO
6. HAVE YOU EVER DONE ANYTHING, STARTED TO DO ANYTHING, OR PREPARED TO DO ANYTHING TO END YOUR LIFE?: NO
2. HAVE YOU ACTUALLY HAD ANY THOUGHTS OF KILLING YOURSELF?: NO

## 2024-11-27 ENCOUNTER — HOME CARE VISIT (OUTPATIENT)
Dept: HOME HEALTH SERVICES | Facility: HOME HEALTH | Age: 54
End: 2024-11-27
Payer: COMMERCIAL

## 2024-11-27 ENCOUNTER — DOCUMENTATION (OUTPATIENT)
Dept: BEHAVIORAL HEALTH | Facility: CLINIC | Age: 54
End: 2024-11-27

## 2024-11-27 ENCOUNTER — APPOINTMENT (OUTPATIENT)
Dept: DIALYSIS | Facility: HOSPITAL | Age: 54
End: 2024-11-27
Payer: COMMERCIAL

## 2024-11-27 ENCOUNTER — DOCUMENTATION (OUTPATIENT)
Dept: PSYCHIATRY | Facility: HOSPITAL | Age: 54
End: 2024-11-27
Payer: COMMERCIAL

## 2024-11-27 ENCOUNTER — COMMITTEE REVIEW (OUTPATIENT)
Dept: TRANSPLANT | Facility: HOSPITAL | Age: 54
End: 2024-11-27

## 2024-11-27 ENCOUNTER — CLINICAL SUPPORT (OUTPATIENT)
Dept: EMERGENCY MEDICINE | Facility: HOSPITAL | Age: 54
End: 2024-11-27
Payer: COMMERCIAL

## 2024-11-27 VITALS
WEIGHT: 116 LBS | TEMPERATURE: 97.5 F | DIASTOLIC BLOOD PRESSURE: 85 MMHG | HEART RATE: 107 BPM | OXYGEN SATURATION: 96 % | SYSTOLIC BLOOD PRESSURE: 196 MMHG | HEIGHT: 55 IN | BODY MASS INDEX: 26.85 KG/M2 | RESPIRATION RATE: 18 BRPM

## 2024-11-27 DIAGNOSIS — F41.9 ANXIETY: ICD-10-CM

## 2024-11-27 PROBLEM — J44.1 COPD EXACERBATION (MULTI): Status: ACTIVE | Noted: 2024-11-27

## 2024-11-27 LAB
ALBUMIN SERPL BCP-MCNC: 3.9 G/DL (ref 3.4–5)
ANION GAP BLDV CALCULATED.4IONS-SCNC: 17 MMOL/L (ref 10–25)
ANION GAP SERPL CALC-SCNC: 22 MMOL/L (ref 10–20)
ATRIAL RATE: 87 BPM
BASE EXCESS BLDV CALC-SCNC: -4.3 MMOL/L (ref -2–3)
BASOPHILS # BLD AUTO: 0.06 X10*3/UL (ref 0–0.1)
BASOPHILS NFR BLD AUTO: 0.9 %
BODY TEMPERATURE: 37 DEGREES CELSIUS
BUN SERPL-MCNC: 50 MG/DL (ref 6–23)
CA-I BLDV-SCNC: 1.25 MMOL/L (ref 1.1–1.33)
CALCIUM SERPL-MCNC: 9.5 MG/DL (ref 8.6–10.6)
CARDIAC TROPONIN I PNL SERPL HS: 72 NG/L (ref 0–34)
CHLORIDE BLDV-SCNC: 106 MMOL/L (ref 98–107)
CHLORIDE SERPL-SCNC: 103 MMOL/L (ref 98–107)
CO2 SERPL-SCNC: 22 MMOL/L (ref 21–32)
CREAT SERPL-MCNC: 10.82 MG/DL (ref 0.5–1.05)
EGFRCR SERPLBLD CKD-EPI 2021: 4 ML/MIN/1.73M*2
EOSINOPHIL # BLD AUTO: 0.17 X10*3/UL (ref 0–0.7)
EOSINOPHIL NFR BLD AUTO: 2.5 %
ERYTHROCYTE [DISTWIDTH] IN BLOOD BY AUTOMATED COUNT: 19 % (ref 11.5–14.5)
GLUCOSE BLD MANUAL STRIP-MCNC: 129 MG/DL (ref 74–99)
GLUCOSE BLDV-MCNC: 134 MG/DL (ref 74–99)
GLUCOSE SERPL-MCNC: 131 MG/DL (ref 74–99)
HCO3 BLDV-SCNC: 21.6 MMOL/L (ref 22–26)
HCT VFR BLD AUTO: 31.2 % (ref 36–46)
HCT VFR BLD EST: 30 % (ref 36–46)
HGB BLD-MCNC: 9.5 G/DL (ref 12–16)
HGB BLDV-MCNC: 9.9 G/DL (ref 12–16)
IMM GRANULOCYTES # BLD AUTO: 0.04 X10*3/UL (ref 0–0.7)
IMM GRANULOCYTES NFR BLD AUTO: 0.6 % (ref 0–0.9)
LACTATE BLDV-SCNC: 0.8 MMOL/L (ref 0.4–2)
LYMPHOCYTES # BLD AUTO: 0.43 X10*3/UL (ref 1.2–4.8)
LYMPHOCYTES NFR BLD AUTO: 6.4 %
MAGNESIUM SERPL-MCNC: 2.75 MG/DL (ref 1.6–2.4)
MCH RBC QN AUTO: 29.2 PG (ref 26–34)
MCHC RBC AUTO-ENTMCNC: 30.4 G/DL (ref 32–36)
MCV RBC AUTO: 96 FL (ref 80–100)
MONOCYTES # BLD AUTO: 0.11 X10*3/UL (ref 0.1–1)
MONOCYTES NFR BLD AUTO: 1.6 %
NEUTROPHILS # BLD AUTO: 5.92 X10*3/UL (ref 1.2–7.7)
NEUTROPHILS NFR BLD AUTO: 88 %
NRBC BLD-RTO: 0 /100 WBCS (ref 0–0)
OXYHGB MFR BLDV: 86.7 % (ref 45–75)
P AXIS: 50 DEGREES
P OFFSET: 196 MS
P ONSET: 146 MS
PCO2 BLDV: 42 MM HG (ref 41–51)
PH BLDV: 7.32 PH (ref 7.33–7.43)
PHOSPHATE SERPL-MCNC: 5.6 MG/DL (ref 2.5–4.9)
PLATELET # BLD AUTO: 231 X10*3/UL (ref 150–450)
PO2 BLDV: 63 MM HG (ref 35–45)
POTASSIUM BLDV-SCNC: 6.4 MMOL/L (ref 3.5–5.3)
POTASSIUM SERPL-SCNC: 5.8 MMOL/L (ref 3.5–5.3)
POTASSIUM SERPL-SCNC: 6 MMOL/L (ref 3.5–5.3)
PR INTERVAL: 152 MS
Q ONSET: 222 MS
QRS COUNT: 14 BEATS
QRS DURATION: 96 MS
QT INTERVAL: 364 MS
QTC CALCULATION(BAZETT): 438 MS
QTC FREDERICIA: 411 MS
R AXIS: 54 DEGREES
RBC # BLD AUTO: 3.25 X10*6/UL (ref 4–5.2)
SAO2 % BLDV: 88 % (ref 45–75)
SODIUM BLDV-SCNC: 138 MMOL/L (ref 136–145)
SODIUM SERPL-SCNC: 141 MMOL/L (ref 136–145)
T AXIS: -71 DEGREES
T OFFSET: 404 MS
VENTRICULAR RATE: 87 BPM
WBC # BLD AUTO: 6.7 X10*3/UL (ref 4.4–11.3)

## 2024-11-27 PROCEDURE — 99222 1ST HOSP IP/OBS MODERATE 55: CPT | Performed by: INTERNAL MEDICINE

## 2024-11-27 PROCEDURE — 83735 ASSAY OF MAGNESIUM: CPT

## 2024-11-27 PROCEDURE — 99236 HOSP IP/OBS SAME DATE HI 85: CPT | Performed by: PODIATRIST

## 2024-11-27 PROCEDURE — 2500000001 HC RX 250 WO HCPCS SELF ADMINISTERED DRUGS (ALT 637 FOR MEDICARE OP): Performed by: PODIATRIST

## 2024-11-27 PROCEDURE — 36415 COLL VENOUS BLD VENIPUNCTURE: CPT

## 2024-11-27 PROCEDURE — 84132 ASSAY OF SERUM POTASSIUM: CPT

## 2024-11-27 PROCEDURE — 2500000002 HC RX 250 W HCPCS SELF ADMINISTERED DRUGS (ALT 637 FOR MEDICARE OP, ALT 636 FOR OP/ED): Performed by: STUDENT IN AN ORGANIZED HEALTH CARE EDUCATION/TRAINING PROGRAM

## 2024-11-27 PROCEDURE — 82947 ASSAY GLUCOSE BLOOD QUANT: CPT

## 2024-11-27 PROCEDURE — 1200000002 HC GENERAL ROOM WITH TELEMETRY DAILY

## 2024-11-27 PROCEDURE — 2500000005 HC RX 250 GENERAL PHARMACY W/O HCPCS: Performed by: STUDENT IN AN ORGANIZED HEALTH CARE EDUCATION/TRAINING PROGRAM

## 2024-11-27 PROCEDURE — 2500000004 HC RX 250 GENERAL PHARMACY W/ HCPCS (ALT 636 FOR OP/ED): Performed by: STUDENT IN AN ORGANIZED HEALTH CARE EDUCATION/TRAINING PROGRAM

## 2024-11-27 PROCEDURE — 2500000004 HC RX 250 GENERAL PHARMACY W/ HCPCS (ALT 636 FOR OP/ED): Mod: JZ,JG

## 2024-11-27 PROCEDURE — G0378 HOSPITAL OBSERVATION PER HR: HCPCS

## 2024-11-27 PROCEDURE — 2500000001 HC RX 250 WO HCPCS SELF ADMINISTERED DRUGS (ALT 637 FOR MEDICARE OP)

## 2024-11-27 PROCEDURE — 82810 BLOOD GASES O2 SAT ONLY: CPT | Mod: CCI

## 2024-11-27 PROCEDURE — 85025 COMPLETE CBC W/AUTO DIFF WBC: CPT

## 2024-11-27 PROCEDURE — 80069 RENAL FUNCTION PANEL: CPT

## 2024-11-27 PROCEDURE — 96365 THER/PROPH/DIAG IV INF INIT: CPT

## 2024-11-27 PROCEDURE — 2500000002 HC RX 250 W HCPCS SELF ADMINISTERED DRUGS (ALT 637 FOR MEDICARE OP, ALT 636 FOR OP/ED)

## 2024-11-27 PROCEDURE — 90935 HEMODIALYSIS ONE EVALUATION: CPT | Performed by: INTERNAL MEDICINE

## 2024-11-27 PROCEDURE — 93005 ELECTROCARDIOGRAM TRACING: CPT

## 2024-11-27 RX ORDER — GABAPENTIN 300 MG/1
300 CAPSULE ORAL NIGHTLY
Status: DISCONTINUED | OUTPATIENT
Start: 2024-11-27 | End: 2024-11-27 | Stop reason: HOSPADM

## 2024-11-27 RX ORDER — PANTOPRAZOLE SODIUM 40 MG/1
40 TABLET, DELAYED RELEASE ORAL DAILY PRN
Status: DISCONTINUED | OUTPATIENT
Start: 2024-11-27 | End: 2024-11-27 | Stop reason: HOSPADM

## 2024-11-27 RX ORDER — VALSARTAN 160 MG/1
320 TABLET ORAL DAILY
Status: DISCONTINUED | OUTPATIENT
Start: 2024-11-27 | End: 2024-11-27 | Stop reason: HOSPADM

## 2024-11-27 RX ORDER — NIFEDIPINE 30 MG/1
90 TABLET, FILM COATED, EXTENDED RELEASE ORAL
Status: DISCONTINUED | OUTPATIENT
Start: 2024-11-27 | End: 2024-11-27 | Stop reason: HOSPADM

## 2024-11-27 RX ORDER — HYDRALAZINE HYDROCHLORIDE 25 MG/1
100 TABLET, FILM COATED ORAL 3 TIMES DAILY
Status: DISCONTINUED | OUTPATIENT
Start: 2024-11-27 | End: 2024-11-27 | Stop reason: HOSPADM

## 2024-11-27 RX ORDER — ISOSORBIDE MONONITRATE 30 MG/1
120 TABLET, EXTENDED RELEASE ORAL DAILY
Status: DISCONTINUED | OUTPATIENT
Start: 2024-11-27 | End: 2024-11-27 | Stop reason: HOSPADM

## 2024-11-27 RX ORDER — CLONIDINE 0.2 MG/24H
1 PATCH, EXTENDED RELEASE TRANSDERMAL WEEKLY
Status: DISCONTINUED | OUTPATIENT
Start: 2024-11-27 | End: 2024-11-27 | Stop reason: HOSPADM

## 2024-11-27 RX ORDER — CALCIUM GLUCONATE 20 MG/ML
2 INJECTION, SOLUTION INTRAVENOUS ONCE
Status: COMPLETED | OUTPATIENT
Start: 2024-11-27 | End: 2024-11-27

## 2024-11-27 RX ORDER — ATORVASTATIN CALCIUM 20 MG/1
80 TABLET, FILM COATED ORAL NIGHTLY
Status: DISCONTINUED | OUTPATIENT
Start: 2024-11-27 | End: 2024-11-27 | Stop reason: HOSPADM

## 2024-11-27 RX ORDER — FLUTICASONE FUROATE AND VILANTEROL 100; 25 UG/1; UG/1
1 POWDER RESPIRATORY (INHALATION)
Status: DISCONTINUED | OUTPATIENT
Start: 2024-11-27 | End: 2024-11-27 | Stop reason: HOSPADM

## 2024-11-27 RX ORDER — CARVEDILOL 12.5 MG/1
50 TABLET ORAL 2 TIMES DAILY
Status: DISCONTINUED | OUTPATIENT
Start: 2024-11-27 | End: 2024-11-27 | Stop reason: HOSPADM

## 2024-11-27 RX ORDER — POLYETHYLENE GLYCOL 3350 17 G/17G
17 POWDER, FOR SOLUTION ORAL DAILY
Status: DISCONTINUED | OUTPATIENT
Start: 2024-11-27 | End: 2024-11-27 | Stop reason: HOSPADM

## 2024-11-27 RX ORDER — DEXTROSE MONOHYDRATE 100 MG/ML
50 INJECTION, SOLUTION INTRAVENOUS CONTINUOUS
Status: DISPENSED | OUTPATIENT
Start: 2024-11-27 | End: 2024-11-27

## 2024-11-27 RX ORDER — CALCIUM ACETATE 667 MG/1
1334 CAPSULE ORAL
Status: DISCONTINUED | OUTPATIENT
Start: 2024-11-27 | End: 2024-11-27 | Stop reason: HOSPADM

## 2024-11-27 RX ORDER — ALBUTEROL SULFATE 90 UG/1
2 INHALANT RESPIRATORY (INHALATION) EVERY 4 HOURS PRN
Status: DISCONTINUED | OUTPATIENT
Start: 2024-11-27 | End: 2024-11-27 | Stop reason: HOSPADM

## 2024-11-27 RX ORDER — ACETAMINOPHEN 325 MG/1
975 TABLET ORAL ONCE
Status: COMPLETED | OUTPATIENT
Start: 2024-11-27 | End: 2024-11-27

## 2024-11-27 RX ORDER — DEXTROSE 50 % IN WATER (D50W) INTRAVENOUS SYRINGE
25 ONCE
Status: COMPLETED | OUTPATIENT
Start: 2024-11-27 | End: 2024-11-27

## 2024-11-27 RX ORDER — ASPIRIN 81 MG/1
81 TABLET ORAL DAILY
Status: DISCONTINUED | OUTPATIENT
Start: 2024-11-27 | End: 2024-11-27 | Stop reason: HOSPADM

## 2024-11-27 ASSESSMENT — PAIN - FUNCTIONAL ASSESSMENT: PAIN_FUNCTIONAL_ASSESSMENT: NO/DENIES PAIN

## 2024-11-27 NOTE — ED TRIAGE NOTES
Pt to ED c/o shortness of breath x 1 hour. Pt denies any chest pain currently. Pt has history of asthma, did not use inhalers PTA. Pt 88% on room air. Pt missed dialysis yesterday.

## 2024-11-27 NOTE — DISCHARGE SUMMARY
Discharge Diagnosis  COPD exacerbation (Multi)    Issues Requiring Follow-Up  [ ] Dialysis - for ESRD on HD  [ ] Primary care doctor- for post ED visit     Test Results Pending At Discharge  Pending Labs       No current pending labs.            Hospital Course  Stacey Heath is a 54 y.o. female with Stacey Heath is a 54 y.o. female with PMH significant for ESRD on dialysis (T/Th/Sat via RUE AVF), HTN, HFpEF, COPD (no o2), brachial DVT on Eliquis presented to [facility] ED with complaint of shortness of breath, admitted for COPD exacerbation 2/2 fluid overload. Given CXR findings, new oxygen requirement, and elevated BP patient will  consult to Nephro Dialysis with hopes for dialysis session 11/27. Troponin elevated likely secondary to fluid overload, hypoxia, and hypertension leading to demand ischemia. Patient underwent dialysis on 11/27. She felt better after dialysis. Blood pressure remained elevated after dialysis 196/85 mm Hg. Patient left AMA before we could bring her blood pressure down.       I have spent in excess of 30 minutes in regards to chart review, treatment and medical management of this patient.     Pertinent Physical Exam At Time of Discharge  Physical Exam  Vitals reviewed.   Constitutional:       General: She is not in acute distress.     Appearance: Normal appearance.   HENT:      Head: Normocephalic and atraumatic.      Nose: Nose normal.      Mouth/Throat:      Mouth: Mucous membranes are moist.   Eyes:      Extraocular Movements: Extraocular movements intact.      Conjunctiva/sclera: Conjunctivae normal.   Cardiovascular:      Rate and Rhythm: Normal rate and regular rhythm.      Heart sounds: Normal heart sounds.   Pulmonary:      Effort: Pulmonary effort is normal.      Breath sounds: Normal breath sounds. No wheezing.   Abdominal:      General: Bowel sounds are normal. There is no distension.      Palpations: Abdomen is soft.      Tenderness: There is no abdominal tenderness.    Musculoskeletal:         General: Normal range of motion.      Cervical back: Normal range of motion.      Right lower leg: No edema.      Left lower leg: No edema.   Skin:     General: Skin is warm.   Neurological:      General: No focal deficit present.      Mental Status: She is alert and oriented to person, place, and time. Mental status is at baseline.   Psychiatric:         Mood and Affect: Mood normal.         Behavior: Behavior normal.         Thought Content: Thought content normal.         Home Medications     Medication List      CONTINUE taking these medications     acetaminophen 500 mg tablet; Commonly known as: Tylenol; Take 2 tablets   (1,000 mg) by mouth every 8 hours if needed for mild pain (1 - 3) for up   to 10 days.   * albuterol 90 mcg/actuation inhaler; Commonly known as: ProAir HFA;   Inhale 2 puffs every 4 hours if needed for wheezing or shortness of   breath.   * albuterol 1.25 mg/3 mL nebulizer solution; Take 3 mL (1.25 mg) by   nebulization every 6 hours if needed for wheezing or shortness of breath.   aspirin 81 mg EC tablet; Take 1 tablet (81 mg) by mouth once daily.   atorvastatin 80 mg tablet; Commonly known as: Lipitor; Take 1 tablet (80   mg) by mouth once daily at bedtime.   Breo Ellipta 100-25 mcg/dose inhaler; Generic drug: fluticasone   furoate-vilanteroL; Inhale 1 puff once daily.   calcium acetate 667 mg capsule; Commonly known as: Phoslo; Take 2   capsules (1,334 mg) by mouth 3 times daily (morning, midday, late   afternoon).   carvedilol 25 mg tablet; Commonly known as: Coreg; Take 2 tablets (50   mg) by mouth 2 times a day.   cloNIDine 0.2 mg/24 hr patch; Commonly known as: Catapres-TTS; Apply one   patch on the skin and replace every 7 days, as directed. Do not start   before October 31, 2024.   cyclobenzaprine 10 mg tablet; Commonly known as: Flexeril; Take 1 tablet   (10 mg) by mouth 3 times a day as needed for muscle spasms for up to 7   days.   docusate sodium 100 mg  capsule; Commonly known as: Colace; Take 1   capsule (100 mg) by mouth every 12 hours.   Eliquis 5 mg tablet; Generic drug: apixaban; Take 1 tablet (5 mg) by   mouth 2 times a day.   epoetin joceline 10,000 unit/mL injection; Commonly known as:   Epogen,Procrit; Inject 1 mL (10,000 Units) under the skin 3 times a week.   fluticasone 50 mcg/actuation nasal spray; Commonly known as: Flonase;   Administer 2 sprays into each nostril once daily. Shake gently. Before   first use, prime pump. After use, clean tip and replace cap.   gabapentin 300 mg capsule; Commonly known as: Neurontin; Take 1 capsule   (300 mg) by mouth once daily at bedtime.   hydrALAZINE 100 mg tablet; Commonly known as: Apresoline; Take 1 tablet   (100 mg) by mouth 3 times a day.   hydrOXYzine HCL 25 mg tablet; Commonly known as: Atarax; Take 1 tablet   (25 mg) by mouth every 6 hours if needed for anxiety, allergies or   itching.   isosorbide mononitrate  mg 24 hr tablet; Commonly known as: Imdur;   Take 1 tablet (120 mg) by mouth once daily. Do not crush or chew.   NIFEdipine ER 90 mg 24 hr tablet; Commonly known as: Adalat CC; Take 1   tablet (90 mg) by mouth once daily in the morning. Take before meals. Do   not crush, chew, or split.   pantoprazole 40 mg EC tablet; Commonly known as: ProtoNix; Take 1 tablet   (40 mg) by mouth if needed (not regular take it). Do not crush, chew, or   split.   polyethylene glycol 17 gram/dose powder; Commonly known as: Glycolax,   Miralax; Take 17 g (1 scoop dissolved in liquid) by mouth once daily. Do   not start before July 26, 2024.   Renal Caps 1 mg capsule; Generic drug: vitamin B complex-vitamin C-folic   acid; Take 1 capsule by mouth once daily.   SUMAtriptan 25 mg tablet; Commonly known as: Imitrex; Take 1 tablet (25   mg) by mouth 1 time if needed for migraine. May repeat dose once in 2   hours if no relief.  Do not exceed 2 doses in 24 hours.   torsemide 20 mg tablet; Commonly known as: Demadex; Take 2  tablets once   daily on non-dialysis days only. Do not take on dialysis days   valsartan 320 mg tablet; Commonly known as: Diovan; Take 1 tablet (320   mg) by mouth once daily in the evening.  * This list has 2 medication(s) that are the same as other medications   prescribed for you. Read the directions carefully, and ask your doctor or   other care provider to review them with you.       Outpatient Follow-Up  Future Appointments   Date Time Provider Department [place]   12/4/2024 12:00 PM Bess Hurst RD [facility]   12/10/2024  1:30 PM Vince Snell DO [id number] [facility]   1/8/2025  3:00 PM Leyla Torres MD [id number] [facility]   1/17/2025 11:40 AM Judy Alcaraz MD [id number] [facility]   1/27/2025  4:00 PM Makenna Barraza MD [id number] [facility]   3/25/2025  1:20 PM Nate Hyman MD [id number] [facility]       Patient seen and discussed with attending physician, Dr. Tam.     Glenys Teixeira MD   PGY2, Family Medicine   [facility]  Available by [employer] Message

## 2024-11-27 NOTE — HOSPITAL COURSE
Stacey Heath is a 54 y.o. female with Stacey Heath is a 54 y.o. female with PMH significant for ESRD on dialysis (T/Th/Sat via RUE AVF), HTN, HFpEF, COPD (no o2), brachial DVT on Eliquis presented to [facility] ED with complaint of shortness of breath, admitted for COPD exacerbation 2/2 fluid overload. Given CXR findings, new oxygen requirement, and elevated BP patient will  consult to Nephro Dialysis with hopes for dialysis session 11/27. Troponin elevated likely secondary to fluid overload, hypoxia, and hypertension leading to demand ischemia. Patient underwent dialysis on 11/27. She felt better after dialysis. Blood pressure remained elevated after dialysis 196/85 mm Hg. Patient left AMA before we could bring her blood pressure down.       I have spent in excess of 30 minutes in regards to chart review, treatment and medical management of this patient.

## 2024-11-27 NOTE — NURSING NOTE
.Report from Sending RN:    Report From: Alba Gil RN  Recent Surgery of Procedure: No  Baseline Level of Consciousness (LOC): A&O X3  Oxygen Use: Yes  Type: 3L NC  Diabetic: No  Last BP Med Given Day of Dialysis: Hydralazine, nifedipine  Last Pain Med Given: NONE  Lab Tests to be Obtained with Dialysis: No  Blood Transfusion to be Given During Dialysis: No  Available IV Access: Yes  Medications to be Administered During Dialysis: No  Continuous IV Infusion Running: Dextrose 15 ml/hr  Restraints on Currently or in the Last 24 Hours: No  Hand-Off Communication: Pt admitted for SOB after missed one session of dialysis. BP elevated in the 200's, nephrology is ok to bring pt with elevated BP. All BP medication given, not on precaution and travel by cart.  Dialysis Catheter Dressing: AVF  Last Dressing Change: N/A

## 2024-11-27 NOTE — NURSING NOTE
Report to Receiving RN:    Report To: PONCHO Dennison  Time Report Called: 1500  Hand-Off Communication: Pt completed 3 hr HD, 3L fluid removed, tolerated tx, /94, HR 99, pt stable, A/O.  Complications During Treatment: No  Ultrafiltration Treatment: Yes  Medications Administered During Dialysis: Yes  Blood Products Administered During Dialysis: No  Labs Sent During Dialysis: No  Heparin Drip Rate Changes: No  Dialysis Catheter Dressing: N/A, AVF  Last Dressing Change: N/A    Last Updated: 2:58 PM by SAKINA MOELLER

## 2024-11-27 NOTE — COMMITTEE REVIEW
Evaluation Date: 3/29/2024   Committee Review Date: 11/27/2024   Organ being evaluated for: Kidney     Transplant Phase:  Evaluation   Transplant Status: Active     Referring Physician:     Transplant Physician:       Primary Diagnosis:      Committee Members:   Brianna Mckee Cynthia   Pharmacy Echo Luo    Areli Alvarez   Transplant Krystal, Henok; Lo Alvarado; Destini Newby; Janet Brown; Britta Mendez   Transplant Nephrology Alan Arnett; Lon Brunner; Makenzie Alfaro; Malaika Silva   Transplant Surgery Cameron Martin; Kelsey Tompkins; Karishma Esquivel; Brice Art       Committee Review Decision:    The candidate's evaluation was presented and discussed at the Transplant Multidisciplinary Selection Conference. After review of the candidate's diagnosis and the evaluations of the multidisciplinary team members, the committee made the following recommendations:     Close the evaluation due to the reason below. Patient can be re-referred once the condition is resolved.    Other: multiple comorbidities    Resolution: Close due to multiple medical comorbidities.  Patient can return once her blood pressure is better controlled and her pulmonary status is optimized.    Lab Results   Component Value Date    HEPBSAB >1,000.0 (H) 03/29/2024       There is no immunization history for the selected administration types on file for this patient.

## 2024-11-27 NOTE — LETTER
November 27, 2024    Stacey CHRISTY Kumar  [address] [facility] [address]      Dear Ms. Heath:    Our multi-disciplinary transplant team completed a review of your medical records on 11/27/2024.  I regret to inform you that the decision was not to proceed with placing you on the [employer] [facility] ([employer]) waiting list for a Kidney transplant.    Our transplant program consists of surgeons and medical doctors who provide coverage 365 days a year, 24 hours a day.     If you have any questions or concerns regarding your insurance coverage or billing issues, a  is available to speak with you.     It is important to keep us updated of any major changes in your medical condition, contact information and health insurance coverage.     Please don't hesitate to contact us at Dept: [phone] with any questions or concerns. We look forward to working with you through this process.      Sincerely,      Britta Mendez RN          The UNOS Toll-free Patient Services Line:  Your Resource for Organ Transplant Information    If you have a question regarding your own medical care, you always should call your transplant hospital first. However, for general organ transplant-related information, you should call the [employer] (UNOS) toll-free patient services line at [phone].  Anyone, including potential transplant candidates, candidates, recipients, family members, friends, living donors, and donor family members, can call this number to:    Talk about organ donation, living donation, the transplant process, the donation process, and transplant policies.  Get a free patient information kit with helpful booklets, waiting list and transplant information, and a list of all transplant hospitals.  Ask questions about the Organ Procurement and Transplantation Network (OPTN) web site (http://optn.transplant.hrsa.gov/), the UNOS Web site (http://unos.org/), or the UNOS web  site for living donors and transplant recipients. (http://www.transplantliving.org/).  Learn how [facility] and the OPTN can help you.  Talk about any concerns that you may have with a transplant hospital.    [employer] is a not-for-profit organization that provides the administrative services for the national OPTN under federal contract to the Health Resources and Services Administration (HRSA), an agency under the U.S. Department of Health and Human Services (HHS).    [facility] and the OPTN are responsible for:    Providing educational material for patients, the public, and professionals.  Raising awareness of the need for donated organs and tissue.  Writing organ transplant policy with help from transplant professionals, transplant patients, transplant candidates, donor families, living donors, and the public.  Coordinating organ procurement, matching, and placement.  Collecting information about every organ transplant and donation that occurs in the [place].    Remember, you should contact your transplant hospital directly if you have questions or concerns about your own medical care including medical records, work-up progress, and test results.    [facility] is not your transplant hospital, and staff at [facility] will not be able to transfer you to your transplant hospital, so keep your transplant hospital’s phone number handy.    However, while you research your transplant needs and learn as much as you can about transplantation and donation, we welcome your call to our toll-free patient services line at [phone].      [facility] PIL Final Rev 1-

## 2024-11-27 NOTE — PROGRESS NOTES
"Pharmacy Medication History Review    Stacey Heath is a 54 y.o. female admitted for COPD exacerbation (Multi). Pharmacy reviewed the patient's [id number] medications and allergies for accuracy.    Medications ADDED:  None  Medications CHANGED:  Docusate Sodium 100 mg capsule - updated to patient taking differently \" PRN\"   Medications REMOVED:   Renal Caps 1 mg capsule - Removed / Duplicate Entry       The list below reflects the updated PTA list.   Prior to Admission Medications   Prescriptions Last Dose Informant   NIFEdipine ER (Adalat CC) 90 mg 24 hr tablet 11/26/2024 Self   Sig: Take 1 tablet (90 mg) by mouth once daily in the morning. Take before meals. Do not crush, chew, or split.   SUMAtriptan (Imitrex) 25 mg tablet 11/26/2024 Self   Sig: Take 1 tablet (25 mg) by mouth 1 time if needed for migraine. May repeat dose once in 2 hours if no relief.  Do not exceed 2 doses in 24 hours.   acetaminophen (Tylenol) 500 mg tablet 11/26/2024 Self   Sig: Take 2 tablets (1,000 mg) by mouth every 8 hours if needed for mild pain (1 - 3) for up to 10 days.   albuterol (ProAir HFA) 90 mcg/actuation inhaler 11/26/2024 Self   Sig: Inhale 2 puffs every 4 hours if needed for wheezing or shortness of breath.   albuterol 1.25 mg/3 mL nebulizer solution 11/26/2024 Self   Sig: Take 3 mL (1.25 mg) by nebulization every 6 hours if needed for wheezing or shortness of breath.   apixaban (Eliquis) 5 mg tablet 11/26/2024 Self   Sig: Take 1 tablet (5 mg) by mouth 2 times a day.   aspirin 81 mg EC tablet 11/26/2024 Morning Self   Sig: Take 1 tablet (81 mg) by mouth once daily.   atorvastatin (Lipitor) 80 mg tablet 11/26/2024 Self   Sig: Take 1 tablet (80 mg) by mouth once daily at bedtime.   calcium acetate (Phoslo) 667 mg capsule 11/26/2024 Self   Sig: Take 2 capsules (1,334 mg) by mouth 3 times daily (morning, midday, late afternoon).   carvedilol (Coreg) 25 mg tablet 11/26/2024 Self   Sig: Take 2 tablets (50 mg) by mouth 2 " times a day.   cloNIDine (Catapres-TTS) 0.2 mg/24 hr patch 11/26/2024 Self   Sig: Apply one patch on the skin and replace every 7 days, as directed. Do not start before October 31, 2024.   cyclobenzaprine (Flexeril) 10 mg tablet 11/26/2024 Self   Sig: Take 1 tablet (10 mg) by mouth 3 times a day as needed for muscle spasms for up to 7 days.   docusate sodium (Colace) 100 mg capsule  Self   Sig: Take 1 capsule (100 mg) by mouth every 12 hours.   Patient taking differently: Take 1 capsule (100 mg) by mouth 2 times a day as needed.   epoetin joceline (Epogen,Procrit) 10,000 unit/mL injection 11/26/2024 Self   Sig: Inject 1 mL (10,000 Units) under the skin 3 times a week.   fluticasone (Flonase) 50 mcg/actuation nasal spray 11/26/2024 Self   Sig: Administer 2 sprays into each nostril once daily. Shake gently. Before first use, prime pump. After use, clean tip and replace cap.   fluticasone furoate-vilanteroL (Breo Ellipta) 100-25 mcg/dose inhaler 11/26/2024 Self   Sig: Inhale 1 puff once daily.   gabapentin (Neurontin) 300 mg capsule 11/26/2024 Self   Sig: Take 1 capsule (300 mg) by mouth once daily at bedtime.   hydrALAZINE (Apresoline) 100 mg tablet 11/26/2024 Self   Sig: Take 1 tablet (100 mg) by mouth 3 times a day.   hydrOXYzine HCL (Atarax) 25 mg tablet 11/26/2024 Self   Sig: Take 1 tablet (25 mg) by mouth every 6 hours if needed for anxiety, allergies or itching.   isosorbide mononitrate ER (Imdur) 120 mg 24 hr tablet 11/26/2024 Self   Sig: Take 1 tablet (120 mg) by mouth once daily. Do not crush or chew.   pantoprazole (ProtoNix) 40 mg EC tablet 11/26/2024 Self   Sig: Take 1 tablet (40 mg) by mouth if needed (not regular take it). Do not crush, chew, or split.   polyethylene glycol (Glycolax, Miralax) 17 gram/dose powder Past Week Self   Sig: Take 17 g (1 scoop dissolved in liquid) by mouth once daily. Do not start before July 26, 2024.   torsemide (Demadex) 20 mg tablet Past Week Self   Sig: Take 2 tablets once  "daily on non-dialysis days only. Do not take on dialysis days   valsartan (Diovan) 320 mg tablet 11/26/2024 Self   Sig: Take 1 tablet (320 mg) by mouth once daily in the evening.   vitamin B complex-vitamin C-folic acid (Nephrocaps) 1 mg capsule 11/26/2024 Self   Sig: Take 1 capsule by mouth once daily.      Facility-Administered Medications: None        The list below reflects the updated allergy list. Please review each documented allergy for additional clarification and justification.  Allergies  Reviewed by Alyce Longo on 11/27/2024        Severity Reactions Comments    Iodine High Hives, Itching, Unknown     Bee Pollen Not Specified Unknown     Bioflavonoids Not Specified Swelling     Citrus And Derivatives Not Specified Unknown     Codeine Not Specified Itching, Hives, Unknown Tolerates percocet   Tolerates percocet Tolerates percocet    Flowers Not Specified Itching     Shellfish Containing Products Not Specified Swelling SEAFOOD            Patient accepts M2B at discharge.     Sources:   [facility] - 09/30/2024 Gabapentin 300 mg capsule QTY: 30 DS: 30   Patient interview  Patient dispense hx    Chart Review    Admission med rec grid   11/20/2024 Discharge Summary with Chelsey Robbins MD     Additional Comments:  Patient currently has on Clonidine 0.2 mg / 24 hr patch while inpatient. It is located on her foot.     No other concerns , pt is a reliable historian and appears adherent to all current home medications.       Alyce Longo  Pharmacy Technician  11/27/24     Secure Chat preferred   If no response call [phone] or "[employer]era \"Med Rec\"   "

## 2024-11-27 NOTE — PROGRESS NOTES
Stacey Heath  Age: 54 y.o.  MRN: [id number]  Date: 11/27/2024  Location of service: virtual    Program Details  Medicaid Community Clinical Case Management  Status: Enrolled  Effective Dates: 10/17/2023 - present  Responsible Staff: NAREN Davidson      Goals Reviewed:      Summary:  This writer joins a zoom call with Ludmila SNEED and the patient. Ludmila SNEED discusses an update to the patient's care plan. This writer asks patient when she will be having the procedure for perineal dialysis. Patient states she doesn't know and states they have not called her to schedule yet.    Appointment start time: 1211  Appointment completion time: 1220  Total time spent with patient (in minutes): 9  Non-Billable Time: 9  Billable Time Total: 0    Roberta Gallego RN

## 2024-11-27 NOTE — ED PROVIDER NOTES
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  Patient noted significant dyspnea beginning 30 minutes before ED presentation.  She noted that she was walking up the stairs and felt short of breath.  Patient noted that she was supposed to have dialysis this past Monday.  She noted that she began dialysis Monday Wednesday Friday because it was a holiday week.  Notes not taking any medications for her shortness of breath at home including inhalers.  Of note, patient was noted to miss her dialysis session yesterday because she was in the ED and was noted to have a right foot sprain.  In triage, patient was noted to receive 3 doses of DuoNebs.  Patient noted improvement work of breathing after the DuoNeb's.  Noted compliance with her home medications including antihypertensives and Eliquis.  Denied fevers, chills, nausea, vomiting, chest pain, headache, trauma, falls, abdominal pain, neck pain, and back pain.    Limitations to history: None  Independent historian(s): Patient  Records Reviewed: Recent available ED and inpatient notes reviewed in EMR.    PMHx/PSHx:  Per HPI.   - has a past medical history of Bacteremia due to methicillin resistant Staphylococcus aureus (07/08/2024), Cardiac/pericardial tamponade (01/20/2024), CHF (congestive heart failure), COPD (chronic obstructive pulmonary disease) (Multi), Coronary artery disease, Disorder of sweat glands (02/25/2023), Dry eye syndrome of bilateral lacrimal glands (03/07/2017), ESRD (end stage renal disease) (Multi), Essential (primary) hypertension (12/27/2022), History of acute pancreatitis (12/21/2020), Low grade squamous intraepithelial lesion (LGSIL) on cervicovaginal cytologic smear (02/25/2023), Migraines, Organ or tissue replaced by  transplant (02/25/2023), and Type 2 myocardial infarction (Multi) (01/20/2024).  - has a past surgical history that includes Tubal ligation (03/07/2017); Other surgical history (03/07/2017); Appendectomy (03/07/2017); Other surgical history (03/07/2017); Other surgical history (12/08/2021); Other surgical history (12/08/2021); and CT angio abdomen w and or wo IV IV contrast (4/23/2023).    Medications:  Reviewed in EMR. See EMR for complete list of medications and doses.    Allergies:  Iodine, Bee pollen, Bioflavonoids, Citrus and derivatives, Codeine, Flowers, and Shellfish containing products    Social History:  - Tobacco:  reports that she has quit smoking. Her smoking use included cigarettes. She has been exposed to tobacco smoke. She has never used smokeless tobacco.   - Alcohol:  reports that she does not currently use alcohol.   - Illicit Drugs:  reports that she does not currently use drugs after having used the following drugs: Cocaine and Marijuana.     ROS:  Per HPI.       ???????????????????????????????????????????????????????????????  Triage Vitals:  T 37.1 °C (98.8 °F)  HR (!) 115  BP (!) 191/93  RR 18  O2 98 %      Physical Exam  Vitals and nursing note reviewed.   Constitutional:       General: She is not in acute distress.  HENT:      Head: Normocephalic and atraumatic.      Nose: Nose normal.      Mouth/Throat:      Mouth: Mucous membranes are moist.   Eyes:      Conjunctiva/sclera: Conjunctivae normal.   Cardiovascular:      Rate and Rhythm: Normal rate and regular rhythm.      Pulses: Normal pulses.   Pulmonary:      Effort: Pulmonary effort is normal. No respiratory distress.      Comments: Crackles at the bases.  Abdominal:      Palpations: Abdomen is soft.      Tenderness: There is no abdominal tenderness.   Skin:     General: Skin is warm.   Neurological:      General: No focal deficit present.      Mental Status: She is alert.          ???????????????????????????????????????????????????????????????  Labs:   Labs Reviewed   CBC WITH AUTO DIFFERENTIAL - Abnormal       Result Value    WBC 7.0      nRBC 0.3 (*)     RBC 3.49 (*)     Hemoglobin 10.4 (*)     Hematocrit 33.5 (*)     MCV 96      MCH 29.8      MCHC 31.0 (*)     RDW 18.7 (*)     Platelets 259      Neutrophils % 67.9      Immature Granulocytes %, Automated 1.1 (*)     Lymphocytes % 9.4      Monocytes % 6.8      Eosinophils % 13.5      Basophils % 1.3      Neutrophils Absolute 4.77      Immature Granulocytes Absolute, Automated 0.08      Lymphocytes Absolute 0.66 (*)     Monocytes Absolute 0.48      Eosinophils Absolute 0.95 (*)     Basophils Absolute 0.09     COMPREHENSIVE METABOLIC PANEL - Abnormal    Glucose 122 (*)     Sodium 143      Potassium 5.9 (*)     Chloride 105      Bicarbonate 21      Anion Gap 23 (*)     Urea Nitrogen 48 (*)     Creatinine 9.95 (*)     eGFR 4 (*)     Calcium 9.0      Albumin 4.1      Alkaline Phosphatase 146 (*)     Total Protein 7.5      AST 38      Bilirubin, Total 0.4      ALT 19     B-TYPE NATRIURETIC PEPTIDE - Abnormal     (*)     Narrative:        <100 pg/mL - Heart failure unlikely  100-299 pg/mL - Intermediate probability of acute heart                  failure exacerbation. Correlate with clinical                  context and patient history.    >=300 pg/mL - Heart Failure likely. Correlate with clinical                  context and patient history.     Biotin interference may cause falsely decreased results. Patients taking a Biotin dose of up to 5 mg/day should refrain from taking Biotin for 24 hours before sample  collection. Providers may contact their local laboratory for further information.   SERIAL TROPONIN-INITIAL - Abnormal    Troponin I, High Sensitivity (CMC) 65 (*)     Narrative:     Less than 99th percentile of normal range cutoff-  Female and children under 18 years old <35 ng/L; Male <54 ng/L: Negative  Repeat testing  should be performed if clinically indicated.     Female and children under 18 years old  ng/L; Male  ng/L:  Consistent with possible cardiac damage and possible increased clinical   risk. Serial measurements may help to assess extent of myocardial damage.     >120 ng/L: Consistent with cardiac damage, increased clinical risk and  myocardial infarction. Serial measurements may help assess extent of   myocardial damage.      NOTE: Children less than 1 year old may have higher baseline troponin   levels and results should be interpreted in conjunction with the overall   clinical context.    NOTE: Troponin I testing is performed using a different   testing methodology at [facility] than at other   [facility]. Direct result comparisons should only   be made within the same method.     TROPONIN SERIES- (INITIAL, 1 HR)    Narrative:     The following orders were created for panel order Troponin I Series, High Sensitivity (0, 1 HR).  Procedure                               Abnormality         Status                     ---------                               -----------         ------                     Troponin I, High Sensiti...[[id number]]  Abnormal            Final result               Troponin, High Sensitivi...[[id number]]                                                   Please view results for these tests on the individual orders.   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  Patient presented hypertensive 191/93 and tachycardic with a heart rate of 115 out in triage.  Patient noted to have improvement of  blood pressure as well as heart rate when placed into a room.  Patient noted to receive DuoNebs in triage.  Low clinical concern for PE, pneumothorax, and acute aortic process.  Patient administered prednisone with concern for COPD exacerbation.  Patient is on a new oxygen requirement 3 L nasal cannula.  Thoracic POCUS displayed B-lines.  Concern for fluid overload.  Patient administered 40 mg of IV Lasix.  Please see ED course and disposition for remainder of care.    ED Course:  ED Course as of 11/28/24 2253   Tue Nov 26, 2024   2245 Comprehensive Metabolic Panel(!)  Patient noted to have an elevated potassium of 5.9 there is noted to be mildly hemolyzed.  Repeat potassium pending. [MH]   2245 CBC with Differential(!)  CBC without leukocytosis and anemia near baseline. [MH]   2245 Troponin I Series, High Sensitivity (0, 1 HR)(!)  Initial troponin elevated 65, previous troponins were noted to be in the 40s. [MH]   2246 Brain Natriuretic Peptide(!)  BNP elevated 762, previous BNP is noted to be elevated in the 2000's. [MH]   2336 XR chest 2 views  CXR significant for pulmonary edema and left-sided pleural effusion. []   Wed Nov 27, 2024   0034 EKG: Rate is 107, sinus rhythm, normal axis, no interval prolongation, no st elevation or depression.  Peaked T waves in V3.  When compared to EKG on 11/9/23 review of EKG does not show any signs of STEMI, complete heart block, asystole, V-fib. [MH]   0100 Repeat EKG without peaked T waves.  Initial EKG was obtained before DuoNebs. [MH]   0109 Delta troponin 72. [MH]      ED Course User Index  [MH] Antonio Augustin MD         Diagnoses as of 11/28/24 2253   COPD exacerbation (Multi)   Shortness of breath       Social Determinants Limiting Care:  None identified    Disposition:  Patient noted to likely have fluid overload secondary to missing dialysis.  Given new oxygen requirement, discussed ED findings and admission with the patient for COPD exacerbation as well as dialysis.   Patient stated understanding and agreement the plan.  All questions were answered.  Discussed patient presentation with admitting team.  Patient admitted to medicine in stable condition.    Antonio Augustin MD   Emergency Medicine PGY-3  [facility]    Comment: Please note this report has been produced using speech recognition software and may contain errors related to that system including errors in grammar, punctuation, and spelling as well as words and phrases that may be inappropriate.  If there are any questions or concerns please feel free to contact the dictating provider for clarification.    Critical Care    Performed by: Steve Sevilla MD  Authorized by: Steve Sevilla MD    Critical care provider statement:     Critical care time (minutes):  31    Critical care time was exclusive of:  Separately billable procedures and treating other patients and teaching time    Critical care was necessary to treat or prevent imminent or life-threatening deterioration of the following conditions:  Respiratory failure and renal failure    Critical care was time spent personally by me on the following activities:  Development of treatment plan with patient or surrogate, evaluation of patient's response to treatment, examination of patient, obtaining history from patient or surrogate, ordering and performing treatments and interventions, ordering and review of radiographic studies, ordering and review of laboratory studies, pulse oximetry, re-evaluation of patient's condition and review of old charts   ? Variable last updated 11/26/2024 10:47 PM        Antonio Augustin MD  Resident  11/28/24 3012        The patient was seen by the resident/fellow.  I have personally performed a substantive portion of the encounter.  I have seen and examined the patient; agree with the workup, evaluation, MDM, management and diagnosis.  The care plan has been discussed with the resident; I have reviewed the  resident’s note and agree with the documented findings.                                                    Steve Sevilla MD  11/29/24 2643

## 2024-11-27 NOTE — H&P
History and Physical        Subjective   Stacey is a 54 y.o. female who presented to ED for shortness of breath, admitted to Hospitalist Team D on 11/26/2024 for COPD exacerbation (Multi).    HPI:  Stacey Heath is a 54 y.o. female with PMH significant for ESRD on dialysis (T/Th/Sat via RUE AVF), HTN, HFpEF, COPD (no o2), brachial DVT on Eliquis presented to [facility] ED with complaint of shortness of breath after missing her Tuesday dialysis session. States she missed the session due to spraining her foot, last completed session was Saturday per patient. Patient states she has been compliant with all of her home medications. Denies chest pain, headache, dizziness, palpitations, nausea, vomiting, sick contacts, bowel or urinary changes.     Patient dyspnea resolved after treatment with inhalers in ED. Patient underwent CXR which showed new bilateral, perihilar airspace  infiltrates possibly representing pulmonary edema versus developing  infiltrate. Patient hypertensive to 191/93. Troponin's slightly elevated to 65>72 from baseline mid 40's. Patient hyperkalemic to 5.9, Cr 9.95, BUN 48.  which was down trending from 2229 on 11/8.    Of note, patient with multiple recent admissions for missed dialysis and hypertensive episodes.    Patient Active Problem List   Diagnosis    Anxiety    Astigmatism    Carotid bruit    Diabetes mellitus type 2, uncomplicated (Multi)    Coronary artery disease involving native coronary artery    Iron deficiency anemia    Migraine    Neuropathy    Transplant    Polyneuropathy due to type 2 diabetes mellitus (Multi)    Nuclear senile cataract of both eyes    Normocytic normochromic anemia    Low back pain    Hidradenitis    Complex dyslipidemia    Chronic heart failure with preserved ejection fraction (HFpEF)    Malnutrition of moderate degree (Multi)    Kidney transplant candidate    Nonrheumatic mitral valve regurgitation    Chronic deep vein thrombosis (DVT) of brachial vein of left  upper extremity (Multi)    Acute diastolic CHF (congestive heart failure)    Hypertensive emergency    Resistant hypertension    Congestive heart failure    ESRD on hemodialysis (Multi)    Flash pulmonary edema    Sleep-related breathing disorder    Congestive heart failure, unspecified HF chronicity, unspecified heart failure type    COPD exacerbation (Multi)      Past Medical History:   Diagnosis Date    Bacteremia due to methicillin resistant Staphylococcus aureus 07/08/2024    Cardiac/pericardial tamponade 01/20/2024    CHF (congestive heart failure)     COPD (chronic obstructive pulmonary disease) (Multi)     Coronary artery disease     Disorder of sweat glands 02/25/2023    Dry eye syndrome of bilateral lacrimal glands 03/07/2017    Dry eyes    ESRD (end stage renal disease) (Multi)     Essential (primary) hypertension 12/27/2022    Hypertension    History of acute pancreatitis 12/21/2020    History of acute pancreatitis    Low grade squamous intraepithelial lesion (LGSIL) on cervicovaginal cytologic smear 02/25/2023    Migraines     History of migraine    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    Type 2 myocardial infarction (Multi) 01/20/2024     Past Surgical History:   Procedure Laterality Date    APPENDECTOMY  03/07/2017    Appendectomy    CT ABDOMEN ANGIOGRAM W AND/OR WO IV CONTRAST  4/23/2023    CT ABDOMEN ANGIOGRAM W AND/OR WO IV CONTRAST [facility] CT    OTHER SURGICAL HISTORY  03/07/2017    Cystoscopy With Pyeloscopy With Removal Of Calculus    OTHER SURGICAL HISTORY  03/07/2017    Anoscopy For Polyp Removal    OTHER SURGICAL HISTORY  12/08/2021    Arteriovenous fistula creation procedure    OTHER SURGICAL HISTORY  12/08/2021    Dialysis tunneled catheter placement    TUBAL LIGATION  03/07/2017    Tubal Ligation     Current Outpatient Medications   Medication Instructions    acetaminophen (TYLENOL) 1,000 mg, oral, Every 8 hours PRN    albuterol (ProAir HFA) 90 mcg/actuation inhaler 2 puffs,  inhalation, Every 4 hours PRN    albuterol 1.25 mg, nebulization, Every 6 hours PRN    aspirin 81 mg, oral, Daily    atorvastatin (LIPITOR) 80 mg, oral, Nightly    calcium acetate (PHOSLO) 1,334 mg, oral, 3 times daily (morning, midday, late afternoon)    carvedilol (COREG) 50 mg, oral, 2 times daily    cloNIDine (Catapres-TTS) 0.2 mg/24 hr patch Apply one patch on the skin and replace every 7 days, as directed. Do not start before October 31, 2024.    cyclobenzaprine (FLEXERIL) 10 mg, oral, 3 times daily PRN    docusate sodium (COLACE) 100 mg, oral, Every 12 hours    Eliquis 5 mg, oral, 2 times daily    epoetin joceline (EPOGEN,PROCRIT) 10,000 Units, subcutaneous, 3 times weekly    fluticasone (Flonase) 50 mcg/actuation nasal spray 2 sprays, Each Nostril, Daily, Shake gently. Before first use, prime pump. After use, clean tip and replace cap.    fluticasone furoate-vilanteroL (Breo Ellipta) 100-25 mcg/dose inhaler 1 puff, inhalation, Daily RT    gabapentin (NEURONTIN) 300 mg, oral, Nightly    hydrALAZINE (APRESOLINE) 100 mg, oral, 3 times daily    hydrOXYzine HCL (ATARAX) 25 mg, oral, Every 6 hours PRN    isosorbide mononitrate ER (IMDUR) 120 mg, oral, Daily, Do not crush or chew.    NIFEdipine ER (ADALAT CC) 90 mg, oral, Daily before breakfast, Do not crush, chew, or split.    pantoprazole (PROTONIX) 40 mg, oral, As needed, Do not crush, chew, or split.    polyethylene glycol (Glycolax, Miralax) 17 gram/dose powder Take 17 g (1 scoop dissolved in liquid) by mouth once daily. Do not start before July 26, 2024.    SUMAtriptan (IMITREX) 25 mg, oral, Once as needed, May repeat dose once in 2 hours if no relief.  Do not exceed 2 doses in 24 hours.    torsemide (Demadex) 20 mg tablet Take 2 tablets once daily on non-dialysis days only. Do not take on dialysis days    valsartan (DIOVAN) 320 mg, oral, Every evening    vitamin B complex-vitamin C-folic acid (Nephrocaps) 1 mg capsule 1 capsule, oral, Daily      Allergies  "  Allergen Reactions    Iodine Hives, Itching and Unknown    Bee Pollen Unknown    Bioflavonoids Swelling    Citrus And Derivatives Unknown    Codeine Itching, Hives and Unknown     Tolerates percocet   Tolerates percocet    Tolerates percocet    Flowers Itching    Shellfish Containing Products Swelling     SEAFOOD      Social History     Tobacco Use    Smoking status: Former     Types: Cigarettes     Passive exposure: Past    Smokeless tobacco: Never   Vaping Use    Vaping status: Never Used   Substance Use Topics    Alcohol use: Not Currently    Drug use: Not Currently     Types: Cocaine, Marijuana     Family History   Problem Relation Name Age of Onset    Other (Cerebrovascular Accident) Father      Heart failure Paternal Grandmother      Breast cancer Paternal Grandmother      Other (Primary Cervical Cancer) Paternal Grandmother      Diabetes Paternal Grandfather      Breast cancer Father's Sister      Ovarian cancer Father's Sister         Scheduled Medications:   apixaban, 5 mg, oral, BID  aspirin, 81 mg, oral, Daily  atorvastatin, 80 mg, oral, Nightly  calcium acetate, 1,334 mg, oral, TID  carvedilol, 50 mg, oral, BID  cloNIDine, 1 patch, transdermal, Weekly  fluticasone furoate-vilanteroL, 1 puff, inhalation, Daily  gabapentin, 300 mg, oral, Nightly  hydrALAZINE, 100 mg, oral, TID  isosorbide mononitrate ER, 120 mg, oral, Daily  NIFEdipine ER, 90 mg, oral, Daily before breakfast  polyethylene glycol, 17 g, oral, Daily  vitamin B complex-vitamin C-folic acid, 1 capsule, oral, Daily         Continuous Medications:         PRN Medications:   PRN medications: albuterol, pantoprazole        Objective   Vitals:  Most Recent: /79 (BP Location: Left arm, Patient Position: Lying)   Pulse 93   Temp 37.1 °C (98.8 °F)   Resp 16   Ht 1.397 m (4' 7\")   Wt 52.6 kg (116 lb)   SpO2 98%   BMI 26.96 kg/m²     24hr Min/Max:  Temp  Min: 37.1 °C (98.8 °F)  Max: 37.1 °C (98.8 °F)  Pulse  Min: 86  Max: 115  BP  Min: " 134/62  Max: 191/93  Resp  Min: 16  Max: 19  SpO2  Min: 98 %  Max: 99 %    No intake or output data in the 24 hours ending 11/27/24 0301      Physical exam:    Physical Exam  Constitutional:       General: She is not in acute distress.     Appearance: Normal appearance.   Cardiovascular:      Rate and Rhythm: Normal rate.   Pulmonary:      Effort: Pulmonary effort is normal. No respiratory distress.      Breath sounds: No wheezing.   Chest:      Chest wall: No tenderness.   Abdominal:      General: Abdomen is flat. Bowel sounds are normal.      Tenderness: There is no abdominal tenderness. There is no guarding.   Neurological:      General: No focal deficit present.      Mental Status: She is alert and oriented to person, place, and time.   Psychiatric:         Mood and Affect: Mood normal.         Behavior: Behavior normal.          Lab/Radiology/Diagnostic Review:  Results for orders placed or performed during the hospital encounter of 11/26/24 (from the past 24 hours)   CBC with Differential   Result Value Ref Range    WBC 7.0 4.4 - 11.3 x10*3/uL    nRBC 0.3 (H) 0.0 - 0.0 /100 WBCs    RBC 3.49 (L) 4.00 - 5.20 x10*6/uL    Hemoglobin 10.4 (L) 12.0 - 16.0 g/dL    Hematocrit 33.5 (L) 36.0 - 46.0 %    MCV 96 80 - 100 fL    MCH 29.8 26.0 - 34.0 pg    MCHC 31.0 (L) 32.0 - 36.0 g/dL    RDW 18.7 (H) 11.5 - 14.5 %    Platelets 259 150 - 450 x10*3/uL    Neutrophils % 67.9 40.0 - 80.0 %    Immature Granulocytes %, Automated 1.1 (H) 0.0 - 0.9 %    Lymphocytes % 9.4 13.0 - 44.0 %    Monocytes % 6.8 2.0 - 10.0 %    Eosinophils % 13.5 0.0 - 6.0 %    Basophils % 1.3 0.0 - 2.0 %    Neutrophils Absolute 4.77 1.20 - 7.70 x10*3/uL    Immature Granulocytes Absolute, Automated 0.08 0.00 - 0.70 x10*3/uL    Lymphocytes Absolute 0.66 (L) 1.20 - 4.80 x10*3/uL    Monocytes Absolute 0.48 0.10 - 1.00 x10*3/uL    Eosinophils Absolute 0.95 (H) 0.00 - 0.70 x10*3/uL    Basophils Absolute 0.09 0.00 - 0.10 x10*3/uL   Comprehensive Metabolic Panel    Result Value Ref Range    Glucose 122 (H) 74 - 99 mg/dL    Sodium 143 136 - 145 mmol/L    Potassium 5.9 (H) 3.5 - 5.3 mmol/L    Chloride 105 98 - 107 mmol/L    Bicarbonate 21 21 - 32 mmol/L    Anion Gap 23 (H) 10 - 20 mmol/L    Urea Nitrogen 48 (H) 6 - 23 mg/dL    Creatinine 9.95 (H) 0.50 - 1.05 mg/dL    eGFR 4 (L) >60 mL/min/1.73m*2    Calcium 9.0 8.6 - 10.6 mg/dL    Albumin 4.1 3.4 - 5.0 g/dL    Alkaline Phosphatase 146 (H) 33 - 110 U/L    Total Protein 7.5 6.4 - 8.2 g/dL    AST 38 9 - 39 U/L    Bilirubin, Total 0.4 0.0 - 1.2 mg/dL    ALT 19 7 - 45 U/L   Brain Natriuretic Peptide   Result Value Ref Range     (H) 0 - 99 pg/mL   Troponin I, High Sensitivity, Initial   Result Value Ref Range    Troponin I, High Sensitivity ([facility]) 65 (H) 0 - 34 ng/L   Troponin, High Sensitivity, 1 Hour   Result Value Ref Range    Troponin I, High Sensitivity ([facility]) 72 (H) 0 - 34 ng/L   Potassium   Result Value Ref Range    Potassium 5.8 (H) 3.5 - 5.3 mmol/L     *Note: Due to a large number of results and/or encounters for the requested time period, some results have not been displayed. A complete set of results can be found in Results Review.       Assessment     Stacey Heath is a 54 y.o. female with Stacey Heath is a 54 y.o. female with PMH significant for ESRD on dialysis (T/Th/Sat via RUE AVF), HTN, HFpEF, COPD (no o2), brachial DVT on Eliquis presented to [facility] ED with complaint of shortness of breath, admitted for COPD exacerbation 2/2 fluid overload. Given CXR findings, new oxygen requirement, and elevated BP patient will  consult to Nephro Dialysis with hopes for dialysis session 11/27. Troponin elevated likely secondary to fluid overload, hypoxia, and hypertension leading to demand ischemia. Determined to be hemodynamically stable and appropriate for regular nursing floor. To be admitted to Hospitalist Team D for further management. Detailed plan as follows:    Plan      Principal Problem:    COPD exacerbation  (Multi)      #COPD exacerbation   #Acute Hypoxic Respiratory failure 2/2 pulm edema  :: Baseline no home oxygen requirement  :: CXR showed new bilateral, perihilar airspace infiltrates possibly representing pulmonary edema versus developing infiltrate.   - patient with missed dialysis displaying signs of fluid overload on imaging and exam  - initally requiring 3L NC on admission  - s/p lasix 40  - patient now down to 1L NC, no acute distress, speaking in full sentences and conversational  - continued home Albuterol and Breo  - will hold off on Abx and Steroids as fluid removal will likely continue to improve dyspnea    #ESRD on HD  :: Tuesday Thursday Saturday schedule  - missed most recent session due to foot sprain  - admission Cr 9.95  - continued home Nephro caps, Phoslo  - Continued Home Epogen 3x weekly   - Consulted Nephro Dialysis    #HTN  #HFpEF  #Tropinemia  - Troponin 65>72, likely 2/2 demand ischemia  - admission /93, improved after medications and lasix  - continued home Coreg 50 mg, Hydralazine 100 mg TID, Imdur 120 mg, Nifed ER 90 mg, Valsartan 320 mg, Lipitor 80 mg, Clonidine patch  - Holding home torsemide as not taken on dialysis days    #DVT  - continued home Eliquis and ASA 81 mg    Dietary Orders (From admission, onward)       Start     Ordered    11/27/24 0254  Adult diet Regular, Renal; Potassium Restricted 2 gm (50mEq); 2 - 3 grams Sodium  Diet effective now        Question Answer Comment   Diet type Regular    Diet type Renal    Potassium restriction: Potassium Restricted 2 gm (50mEq)    Sodium restriction: 2 - 3 grams Sodium        11/27/24 0254                   Fluids: PRN  O2: 1L NC  DVT ppx: Eliquis  GI ppx: Protonix  Abx: n/a  Diet: Renal  Code Status: Full Code   NOK: Hai Pinedo [phone]   PCP: Sherri Gastelum MD     Patient discussed with attending physician Dr. Champ Stone  Plan preliminary until cosigned by attending physician.    Ezequiel Hudson MD  Family Medicine  PGY-2

## 2024-11-27 NOTE — CONSULTS
"NEPHROLOGY CONSULT NOTE     Reason for consult: ESRD-HD  Evaluation of patient on dialysis at [facility]  [address]  [facility] [id number] on 11/26/2024  Chief Complaint   Patient presents with    Shortness of Breath        HPI  Stacey Heath is a 54 y.o. female With past medical Hx as below in ED  for dyspnea , HTN urgency, hyperkalemia , in the setting of missed x 1 HD ;    Is under the \"Personalized Care\" program  at  and has visiting home SW to help with coordinating care     Nephrology was consulted for ESRD management     Renal Care:   TTS [facility]/Dr Barraza; last HD 11/23 post weight 55.4; DW 51; ELI AVF. K 6.4 on blood gas with RFP pending; Hgb 9.5; /96; Mircera 200mcg Q 2 weeks last dose 11/23.     From H&P note:   Stacey Heath is a 54 y.o. 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 States she missed the session due to spraining her foot, last completed session was Saturday per patient. Patient states she has been compliant with all of her home medications. Denies chest pain, headache, dizziness, palpitations, nausea, vomiting, sick contacts, bowel or urinary changes.      Patient dyspnea resolved after treatment with inhalers in ED. Patient underwent CXR which showed new bilateral, perihilar airspace  infiltrates possibly representing pulmonary edema versus developing  infiltrate. Patient hypertensive to 191/93. Troponin's slightly elevated to 65>72 from baseline mid 40's. Patient hyperkalemic to 5.9, Cr 9.95, BUN 48.  which was down trending from 2229 on 11/8.     Of note, patient with multiple recent admissions for missed dialysis and hypertensive episodes.      In The ER: BP (!) 207/93 (Patient Position: Lying)   Pulse 99   Temp 36.3 °C (97.3 °F) (Temporal)   " "Resp 19   Ht 1.397 m (4' 7\")   Wt 52.6 kg (116 lb)   SpO2 98%   BMI 26.96 kg/m²      Past Medical History:   Diagnosis Date    Bacteremia due to methicillin resistant Staphylococcus aureus 07/08/2024    Cardiac/pericardial tamponade 01/20/2024    CHF (congestive heart failure)     COPD (chronic obstructive pulmonary disease) (Multi)     Coronary artery disease     Disorder of sweat glands 02/25/2023    Dry eye syndrome of bilateral lacrimal glands 03/07/2017    Dry eyes    ESRD (end stage renal disease) (Multi)     Essential (primary) hypertension 12/27/2022    Hypertension    History of acute pancreatitis 12/21/2020    History of acute pancreatitis    Low grade squamous intraepithelial lesion (LGSIL) on cervicovaginal cytologic smear 02/25/2023    Migraines     History of migraine    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    Type 2 myocardial infarction (Multi) 01/20/2024      Past Surgical History:   Procedure Laterality Date    APPENDECTOMY  03/07/2017    Appendectomy    CT ABDOMEN ANGIOGRAM W AND/OR WO IV CONTRAST  4/23/2023    CT ABDOMEN ANGIOGRAM W AND/OR WO IV CONTRAST [facility] CT    OTHER SURGICAL HISTORY  03/07/2017    Cystoscopy With Pyeloscopy With Removal Of Calculus    OTHER SURGICAL HISTORY  03/07/2017    Anoscopy For Polyp Removal    OTHER SURGICAL HISTORY  12/08/2021    Arteriovenous fistula creation procedure    OTHER SURGICAL HISTORY  12/08/2021    Dialysis tunneled catheter placement    TUBAL LIGATION  03/07/2017    Tubal Ligation      Family History   Problem Relation Name Age of Onset    Other (Cerebrovascular Accident) Father      Heart failure Paternal Grandmother      Breast cancer Paternal Grandmother      Other (Primary Cervical Cancer) Paternal Grandmother      Diabetes Paternal Grandfather      Breast cancer Father's Sister      Ovarian cancer Father's Sister       Social History     Socioeconomic History    Marital status: Single     Spouse name: Not on file    Number of " children: Not on file    Years of education: Not on file    Highest education level: Not on file   Occupational History    Not on file   Tobacco Use    Smoking status: Former     Types: Cigarettes     Passive exposure: Past    Smokeless tobacco: Never   Vaping Use    Vaping status: Never Used   Substance and Sexual Activity    Alcohol use: Not Currently    Drug use: Not Currently     Types: Cocaine, Marijuana    Sexual activity: Defer   Other Topics Concern    Not on file   Social History Narrative    Not on file     Social Drivers of Health     Financial Resource Strain: Low Risk  (11/11/2024)    Overall Financial Resource Strain (CARDIA)     Difficulty of Paying Living Expenses: Not very hard   Recent Concern: Financial Resource Strain - Medium Risk (10/2/2024)    Overall Financial Resource Strain (CARDIA)     Difficulty of Paying Living Expenses: Somewhat hard   Food Insecurity: No Food Insecurity (11/11/2024)    Hunger Vital Sign     Worried About Running Out of Food in the Last Year: Never true     Ran Out of Food in the Last Year: Never true   Transportation Needs: No Transportation Needs (11/24/2024)    OASIS : Transportation     Lack of Transportation (Medical): No     Lack of Transportation (Non-Medical): No     Patient Unable or Declines to Respond: No   Physical Activity: Sufficiently Active (7/22/2024)    Exercise Vital Sign     Days of Exercise per Week: 4 days     Minutes of Exercise per Session: 60 min   Stress: No Stress Concern Present (10/21/2024)    [employer] - Occupational Stress Questionnaire     Feeling of Stress : Only a little   Social Connections: Feeling Socially Integrated (11/24/2024)    OASIS : Social Isolation     Frequency of experiencing loneliness or isolation: Never   Intimate Partner Violence: Not At Risk (11/11/2024)    Humiliation, Afraid, Rape, and Kick questionnaire     Fear of Current or Ex-Partner: No     Emotionally Abused: No      Physically Abused: No     Sexually Abused: No   Housing Stability: Low Risk  (11/11/2024)    Housing Stability Vital Sign     Unable to Pay for Housing in the Last Year: No     Number of Times Moved in the Last Year: 0     Homeless in the Last Year: No     Allergies   Allergen Reactions    Iodine Hives, Itching and Unknown    Bee Pollen Unknown    Bioflavonoids Swelling    Citrus And Derivatives Unknown    Codeine Itching, Hives and Unknown     Tolerates percocet   Tolerates percocet    Tolerates percocet    Flowers Itching    Shellfish Containing Products Swelling     SEAFOOD        Prior to Admission Medications   Prescriptions Last Dose Informant Patient Reported? Taking?   NIFEdipine ER (Adalat CC) 90 mg 24 hr tablet 11/26/2024 Self No Yes   Sig: Take 1 tablet (90 mg) by mouth once daily in the morning. Take before meals. Do not crush, chew, or split.   SUMAtriptan (Imitrex) 25 mg tablet 11/26/2024 Self No Yes   Sig: Take 1 tablet (25 mg) by mouth 1 time if needed for migraine. May repeat dose once in 2 hours if no relief.  Do not exceed 2 doses in 24 hours.   acetaminophen (Tylenol) 500 mg tablet 11/26/2024 Self No Yes   Sig: Take 2 tablets (1,000 mg) by mouth every 8 hours if needed for mild pain (1 - 3) for up to 10 days.   albuterol (ProAir HFA) 90 mcg/actuation inhaler 11/26/2024 Self No Yes   Sig: Inhale 2 puffs every 4 hours if needed for wheezing or shortness of breath.   albuterol 1.25 mg/3 mL nebulizer solution 11/26/2024 Self No Yes   Sig: Take 3 mL (1.25 mg) by nebulization every 6 hours if needed for wheezing or shortness of breath.   apixaban (Eliquis) 5 mg tablet 11/26/2024 Self No Yes   Sig: Take 1 tablet (5 mg) by mouth 2 times a day.   aspirin 81 mg EC tablet 11/26/2024 Morning Self No Yes   Sig: Take 1 tablet (81 mg) by mouth once daily.   atorvastatin (Lipitor) 80 mg tablet 11/26/2024 Self No Yes   Sig: Take 1 tablet (80 mg) by mouth once daily at bedtime.   calcium acetate (Phoslo) 667 mg  capsule 11/26/2024 Self No Yes   Sig: Take 2 capsules (1,334 mg) by mouth 3 times daily (morning, midday, late afternoon).   carvedilol (Coreg) 25 mg tablet 11/26/2024 Self No Yes   Sig: Take 2 tablets (50 mg) by mouth 2 times a day.   cloNIDine (Catapres-TTS) 0.2 mg/24 hr patch 11/26/2024 Self No Yes   Sig: Apply one patch on the skin and replace every 7 days, as directed. Do not start before October 31, 2024.   cyclobenzaprine (Flexeril) 10 mg tablet 11/26/2024 Self No Yes   Sig: Take 1 tablet (10 mg) by mouth 3 times a day as needed for muscle spasms for up to 7 days.   docusate sodium (Colace) 100 mg capsule  Self No No   Sig: Take 1 capsule (100 mg) by mouth every 12 hours.   Patient taking differently: Take 1 capsule (100 mg) by mouth 2 times a day as needed.   epoetin joceline (Epogen,Procrit) 10,000 unit/mL injection 11/26/2024 Self No Yes   Sig: Inject 1 mL (10,000 Units) under the skin 3 times a week.   fluticasone (Flonase) 50 mcg/actuation nasal spray 11/26/2024 Self No Yes   Sig: Administer 2 sprays into each nostril once daily. Shake gently. Before first use, prime pump. After use, clean tip and replace cap.   fluticasone furoate-vilanteroL (Breo Ellipta) 100-25 mcg/dose inhaler 11/26/2024 Self No Yes   Sig: Inhale 1 puff once daily.   gabapentin (Neurontin) 300 mg capsule 11/26/2024 Self No Yes   Sig: Take 1 capsule (300 mg) by mouth once daily at bedtime.   hydrALAZINE (Apresoline) 100 mg tablet 11/26/2024 Self No Yes   Sig: Take 1 tablet (100 mg) by mouth 3 times a day.   hydrOXYzine HCL (Atarax) 25 mg tablet 11/26/2024 Self No Yes   Sig: Take 1 tablet (25 mg) by mouth every 6 hours if needed for anxiety, allergies or itching.   isosorbide mononitrate ER (Imdur) 120 mg 24 hr tablet 11/26/2024 Self No Yes   Sig: Take 1 tablet (120 mg) by mouth once daily. Do not crush or chew.   pantoprazole (ProtoNix) 40 mg EC tablet 11/26/2024 Self No Yes   Sig: Take 1 tablet (40 mg) by mouth if needed (not regular  "take it). Do not crush, chew, or split.   polyethylene glycol (Glycolax, Miralax) 17 gram/dose powder Past Week Self No Yes   Sig: Take 17 g (1 scoop dissolved in liquid) by mouth once daily. Do not start before July 26, 2024.   torsemide (Demadex) 20 mg tablet Past Week Self No Yes   Sig: Take 2 tablets once daily on non-dialysis days only. Do not take on dialysis days   valsartan (Diovan) 320 mg tablet 11/26/2024 Self No Yes   Sig: Take 1 tablet (320 mg) by mouth once daily in the evening.   vitamin B complex-vitamin C-folic acid (Nephrocaps) 1 mg capsule 11/26/2024 Self No Yes   Sig: Take 1 capsule by mouth once daily.      Facility-Administered Medications: None          Scheduled medications  apixaban, 5 mg, oral, BID  aspirin, 81 mg, oral, Daily  atorvastatin, 80 mg, oral, Nightly  calcium acetate, 1,334 mg, oral, TID  carvedilol, 50 mg, oral, BID  cloNIDine, 1 patch, transdermal, Weekly  epoetin joceline-epbx, 10,000 Units, subcutaneous, Once per day on Monday Wednesday Friday  fluticasone furoate-vilanteroL, 1 puff, inhalation, Daily  gabapentin, 300 mg, oral, Nightly  hydrALAZINE, 100 mg, oral, TID  isosorbide mononitrate ER, 120 mg, oral, Daily  NIFEdipine ER, 90 mg, oral, Daily before breakfast  polyethylene glycol, 17 g, oral, Daily  valsartan, 320 mg, oral, Daily  vitamin B complex-vitamin C-folic acid, 1 capsule, oral, Daily      dextrose 10 % in water (D10W), 50 mL/hr, Last Rate: 50 mL/hr (11/27/24 0706)      PRN medications: albuterol, pantoprazole     Heart Rate:  []   Temperature:  [36.3 °C (97.3 °F)-37.1 °C (98.8 °F)]   Respirations:  [0-25]   BP: (134-216)/()   Height:  [139.7 cm (4' 7\")]   Weight:  [52.6 kg (116 lb)]   Pulse Ox:  [96 %-100 %]    Weight: 52.6 kg (116 lb)     Seen during HD   General appearance: Awake and alert, oriented, . No distress  HEENT: supple, moist oral mucosa, no mouth ulcers  Neck: + JVD  Skin: no apparent rash  Heart: heart sounds 1 & 2 present and normal, no " friction rub  Lungs: distant breath sounds, + crackles  Abdomen: soft, non tender, no masses palpated  Extremities: +1 edema, no joint swelling,  : deferred  Neuro: No FND, no asterixis   ACCESS: RUEAVF    Results from last 72 hours   Lab Units 11/27/24  0612 11/27/24  0501   SODIUM mmol/L 141  --    POTASSIUM mmol/L 6.0*  --    CO2 mmol/L 22  --    BUN mg/dL 50*  --    CREATININE mg/dL 10.82*  --    PHOSPHORUS mg/dL 5.6*  --    CALCIUM mg/dL 9.5  --    ALBUMIN g/dL 3.9  --    GLUCOSE mg/dL 131*  --    HEMOGLOBIN g/dL  --  9.5*   WBC AUTO x10*3/uL  --  6.7      Lab Results   Component Value Date    GLUCOSE 131 (H) 11/27/2024    CALCIUM 9.5 11/27/2024     11/27/2024    K 6.0 (H) 11/27/2024    CO2 22 11/27/2024     11/27/2024    BUN 50 (H) 11/27/2024    CREATININE 10.82 (H) 11/27/2024       Assessment   ESRD-HD presented with pulmonary edema, uncontrolled hypertension and high K @ 6 mEq/L    Plan   Plan HD per submitted orders, UF 2-3L  as tolerated; extend time to 4 hrs   MBD - Phosphorus binder: continue with home calcium acetate AC  Anemia of CKD: EPO to be given x 3 per week   Access: no issues   BP: high during treatment ; to resume anti HTN meds post HD   Renal Diet   Daily renal MVI   Continue 3 x per week hemodialysis;   Haydee Mosher MD MPH

## 2024-12-01 ENCOUNTER — HOSPITAL ENCOUNTER (EMERGENCY)
Facility: HOSPITAL | Age: 54
Discharge: HOME | DRG: 304 | End: 2024-12-01
Attending: EMERGENCY MEDICINE
Payer: COMMERCIAL

## 2024-12-01 VITALS
HEART RATE: 93 BPM | DIASTOLIC BLOOD PRESSURE: 71 MMHG | WEIGHT: 116 LBS | OXYGEN SATURATION: 91 % | RESPIRATION RATE: 18 BRPM | SYSTOLIC BLOOD PRESSURE: 215 MMHG | TEMPERATURE: 97.8 F | HEIGHT: 55 IN | BODY MASS INDEX: 26.85 KG/M2

## 2024-12-01 DIAGNOSIS — H61.23 BILATERAL IMPACTED CERUMEN: Primary | ICD-10-CM

## 2024-12-01 PROCEDURE — 69210 REMOVE IMPACTED EAR WAX UNI: CPT | Mod: 50

## 2024-12-01 PROCEDURE — 2500000005 HC RX 250 GENERAL PHARMACY W/O HCPCS

## 2024-12-01 PROCEDURE — 2500000002 HC RX 250 W HCPCS SELF ADMINISTERED DRUGS (ALT 637 FOR MEDICARE OP, ALT 636 FOR OP/ED)

## 2024-12-01 PROCEDURE — 69209 REMOVE IMPACTED EAR WAX UNI: CPT | Mod: 50

## 2024-12-01 PROCEDURE — 2500000001 HC RX 250 WO HCPCS SELF ADMINISTERED DRUGS (ALT 637 FOR MEDICARE OP)

## 2024-12-01 PROCEDURE — 99283 EMERGENCY DEPT VISIT LOW MDM: CPT | Performed by: EMERGENCY MEDICINE

## 2024-12-01 PROCEDURE — 99284 EMERGENCY DEPT VISIT MOD MDM: CPT | Performed by: EMERGENCY MEDICINE

## 2024-12-01 PROCEDURE — 69209 REMOVE IMPACTED EAR WAX UNI: CPT | Performed by: EMERGENCY MEDICINE

## 2024-12-01 RX ORDER — HYDRALAZINE HYDROCHLORIDE 25 MG/1
100 TABLET, FILM COATED ORAL 3 TIMES DAILY
Status: DISCONTINUED | OUTPATIENT
Start: 2024-12-01 | End: 2024-12-01 | Stop reason: HOSPADM

## 2024-12-01 RX ORDER — VALSARTAN 160 MG/1
320 TABLET ORAL ONCE
Status: COMPLETED | OUTPATIENT
Start: 2024-12-01 | End: 2024-12-01

## 2024-12-01 RX ORDER — HYDROGEN PEROXIDE 3 %
SOLUTION, NON-ORAL MISCELLANEOUS
Status: COMPLETED
Start: 2024-12-01 | End: 2024-12-01

## 2024-12-01 ASSESSMENT — PAIN - FUNCTIONAL ASSESSMENT: PAIN_FUNCTIONAL_ASSESSMENT: 0-10

## 2024-12-01 ASSESSMENT — PAIN SCALES - GENERAL: PAINLEVEL_OUTOF10: 0 - NO PAIN

## 2024-12-01 NOTE — ED TRIAGE NOTES
Pt presents tot the ED for complaints of bilateral ear pain and fullness that started last night. Pt is on dialysis T,TH,Sat and got a full treatment yesterday. Pt states she had fullness in the right ear and is unable to hear in that ear and has pain in the one on the left. Pt does have a history of HTN and just took her medications prior to coming here

## 2024-12-01 NOTE — ED PROVIDER NOTES
History of Present Illness     History provided by: Patient  Limitations to History: None Identified  External Records Reviewed with Brief Summary: Previous ED visits/recent PCP notes for PMH     HPI:  Stacey Heath is a 54 y.o. 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 Now experiencing pain in L ear for 1 day, throbbing that she rates as 4/10. No HA/fevers/chills/rhinorrhea/SOB/CP.   Does endorse a cold 1wk ago.  States that she will occasionally get wax buildup and had tried Debrox at home without significant help.  Did attempt peroxide in her ears which also did not help though that is when the pain in her left ear started.  No fevers or chills.    Physical Exam   Triage vitals:  T 36.3 °C (97.3 °F)  HR 91  BP (!) 219/78  RR 18  O2 96 % None (Room air)    General: Awake, alert, in no acute distress  Eyes: Gaze conjugate.  No scleral icterus or injection  HENT: Normo-cephalic, atraumatic. No stridor  CV: RRR. Radial/PT pulses 2+ bilaterally, R AVF with thrill.  Resp: Breathing non-labored, speaking in full sentences.  Clear to auscultation bilaterally  GI: Soft, non-distended, non-tender. No rebound or guarding.  : Deferred  MSK/Extremities: No gross bony deformities. Moving all extremities  Skin: Warm. Appropriate color  Neuro: CN II-VII, CNIX-XII intact, decreased hearing on R. Alert. Oriented. Face symmetric. Speech is fluent.  Gross strength and sensation intact in b/l UE and LEs  Psych: Appropriate mood and affect      Medical Decision Making & ED Course   Medical Decision Makin y.o. female who presents for evaluation of decreased hearing in her right ear with cerumen impaction.  She is acutely hypertensive here in the setting of ESRD and chronic hypertension her afternoon medications were ordered.  Patient is asymptomatic no headache vision changes chest  pain shortness of breath concerning for hypertensive emergency she has bilateral cerumen impaction with decreased hearing on the left.  After removal of wax patient's hearing is improved.  She had been trying Debrox at home and peroxide after both Debrox and peroxide here as well as curette was able to clear TMs.  No significant ongoing pain improvement in hearing at this time will not cover for antibiotic otitis externa and no underlying acute otitis media.  Did recommend follow-up for reevaluation by PCP.  Patient discharged in stable condition.  ----     Social Determinants of Health which Significantly Impact Care: None identified The following actions were taken to address these social determinants: Patient given list of PCPs      Independent Result Review and Interpretation: Results were independently reviewed and interpreted by myself. Please see ED course and [facility] for full interpretation.    Chronic conditions affecting the patient's care: As documented in the [facility]    Care Considerations: As per [facility]    ED Course:  Diagnoses as of 12/01/24 2104   Bilateral impacted cerumen     Disposition   As a result of the work-up, the patient was discharged home.  she was informed of her diagnosis and instructed to come back with any concerns or worsening of condition.  she and was agreeable to the plan as discussed above.  she was given the opportunity to ask questions.  All of the patient's questions were answered.    Procedures   Ear Cerumen Removal    Performed by: Jessica Castillo DO  Authorized by: Mitchell Guadalupe MD    Consent:     Consent obtained:  Verbal    Risks discussed:  Incomplete removal and pain  Procedure details:     Location:  L ear and R ear    Procedure type: irrigation      Procedure outcomes: cerumen removed    Post-procedure details:     Inspection:  Ear canal clear and TM intact    Hearing quality:  Improved    Procedure completion:  Tolerated well, no immediate complications      Patient seen and  discussed with ED attending physician.    Jessica Castillo DO  PGY-3 Emergency Medicine     Jessica Castillo DO  Resident  12/01/24 4712

## 2024-12-02 ENCOUNTER — DOCUMENTATION (OUTPATIENT)
Dept: BEHAVIORAL HEALTH | Facility: CLINIC | Age: 54
End: 2024-12-02
Payer: COMMERCIAL

## 2024-12-02 NOTE — PROGRESS NOTES
Stacey Heath  Age: 54 y.o.  MRN: [id number]  Date: 11/27/2024  Location of service: in community    Program Details  Medicaid Community Clinical Case Management  Status: Enrolled  Effective Dates: 10/17/2023 - present  Responsible Staff: NAREN Davidson      Goals Reviewed:  Problem: Access to Care Issue       Goal: Assess and Address Access Barriers       Priority: Medium        Problem: Anxiety       Goal: Maintain coping skills for continuous improvement       Priority: Medium        Problem: Financial Stressors       Goal: Assistance with financial concerns         Problem: Kidney Issues       Goal: Improve health to get on kidney transplant list       Priority: High        Problem: Medication Adherence       Goal: Adherence to Medication Regimen       Priority: High        Problem: Negative Experience, Conflict with, or Distrust of Providers and/or Health System       Goal: Plan to Address Patient Specific Negative Experience, Distrust, or Conflict with Providers and/or Health System       Priority: High        Problem: Risk of Uncoordinated Care       Goal: Care will be Coordinated and Supported by a Multidisciplinary Team of Providers       Priority: High          Summary:  This provider met with patient at [facility] while patient was receiving dialysis per the patient's request for our weekly appointment. This provider reviewed and updated the patient's Care Plan with patient. This provider and patient added a few problems, goals, and interventions to include medical adherence and a new goal for anxiety. This provider verbally reviewed the importance of updating the care plan to the patient. This provider reviewed the above Care Plan with the patient to ensure patient was in agreement with the revisions. Patient verbalized that she understands and agrees with the above plan.     Appointment start time: 1106  Appointment completion time: 1222  Total time spent with patient (in minutes): 76           April M Pradeep, [id number]

## 2024-12-02 NOTE — PROGRESS NOTES
Stacey Heath  Age: 54 y.o.  MRN: [id number]  Date: 12/2/2024  Location of service: phone call and care coordination    Program Details  Medicaid Community Clinical Case Management  Status: Enrolled  Effective Dates: 10/17/2023 - present  Responsible Staff: NAREN Davidson      Goals Reviewed:  Problem: Kidney Issues       Goal: Improve health to get on kidney transplant list       Priority: High        Problem: Risk of Uncoordinated Care       Goal: Care will be Coordinated and Supported by a Multidisciplinary Team of Providers       Priority: High        Summary:  This writer discusses patient's situation with Dr. Barraza and Lorena CARR-CNP. We agree that having a meeting to discuss the patient further is the best idea. This writer calls patient to inquire about when she heard about peritoneal dialysis and how many abd surgeries she has had. Patient can't remember when she heard about PD and states she has had 2 abd surgeries. This writer informs the patient's team.    Appointment start time: 1450  Appointment completion time: 1510  Total time spent with patient (in minutes): 20  Non-Billable Time: 20  Billable Time Total: 0    Roberta Gallego RN

## 2024-12-03 ENCOUNTER — HOSPITAL ENCOUNTER (INPATIENT)
Facility: HOSPITAL | Age: 54
End: 2024-12-03
Attending: STUDENT IN AN ORGANIZED HEALTH CARE EDUCATION/TRAINING PROGRAM | Admitting: STUDENT IN AN ORGANIZED HEALTH CARE EDUCATION/TRAINING PROGRAM
Payer: COMMERCIAL

## 2024-12-03 ENCOUNTER — APPOINTMENT (OUTPATIENT)
Dept: RADIOLOGY | Facility: HOSPITAL | Age: 54
DRG: 304 | End: 2024-12-03
Payer: COMMERCIAL

## 2024-12-03 DIAGNOSIS — I50.31 ACUTE DIASTOLIC CHF (CONGESTIVE HEART FAILURE): ICD-10-CM

## 2024-12-03 DIAGNOSIS — N18.6 ESRD ON HEMODIALYSIS (MULTI): ICD-10-CM

## 2024-12-03 DIAGNOSIS — F41.9 ANXIETY: ICD-10-CM

## 2024-12-03 DIAGNOSIS — I82.622 ACUTE DEEP VEIN THROMBOSIS (DVT) OF LEFT UPPER EXTREMITY (MULTI): ICD-10-CM

## 2024-12-03 DIAGNOSIS — Z99.2 ESRD ON HEMODIALYSIS (MULTI): ICD-10-CM

## 2024-12-03 DIAGNOSIS — I50.9 CONGESTIVE HEART FAILURE, UNSPECIFIED HF CHRONICITY, UNSPECIFIED HEART FAILURE TYPE: ICD-10-CM

## 2024-12-03 DIAGNOSIS — R06.02 SHORTNESS OF BREATH: Primary | ICD-10-CM

## 2024-12-03 DIAGNOSIS — I10 ESSENTIAL HYPERTENSION: ICD-10-CM

## 2024-12-03 DIAGNOSIS — I16.1 HYPERTENSIVE EMERGENCY: ICD-10-CM

## 2024-12-03 DIAGNOSIS — M54.50 LEFT-SIDED LOW BACK PAIN WITHOUT SCIATICA, UNSPECIFIED CHRONICITY: ICD-10-CM

## 2024-12-03 DIAGNOSIS — K59.00 CONSTIPATION, UNSPECIFIED CONSTIPATION TYPE: ICD-10-CM

## 2024-12-03 DIAGNOSIS — I1A.0 RESISTANT HYPERTENSION: ICD-10-CM

## 2024-12-03 DIAGNOSIS — J44.1 COPD EXACERBATION (MULTI): ICD-10-CM

## 2024-12-03 DIAGNOSIS — I16.0 HYPERTENSIVE URGENCY: ICD-10-CM

## 2024-12-03 LAB
ALBUMIN SERPL BCP-MCNC: 4.5 G/DL (ref 3.4–5)
ALP SERPL-CCNC: 155 U/L (ref 33–110)
ALT SERPL W P-5'-P-CCNC: 25 U/L (ref 7–45)
ANION GAP BLDV CALCULATED.4IONS-SCNC: 14 MMOL/L (ref 10–25)
ANION GAP SERPL CALC-SCNC: 19 MMOL/L (ref 10–20)
AST SERPL W P-5'-P-CCNC: 76 U/L (ref 9–39)
BASE EXCESS BLDV CALC-SCNC: 3.7 MMOL/L (ref -2–3)
BASOPHILS # BLD AUTO: 0.06 X10*3/UL (ref 0–0.1)
BASOPHILS NFR BLD AUTO: 1.2 %
BILIRUB SERPL-MCNC: 0.6 MG/DL (ref 0–1.2)
BODY TEMPERATURE: 37 DEGREES CELSIUS
BUN SERPL-MCNC: 28 MG/DL (ref 6–23)
CA-I BLDV-SCNC: 1.07 MMOL/L (ref 1.1–1.33)
CALCIUM SERPL-MCNC: 8.8 MG/DL (ref 8.6–10.6)
CARDIAC TROPONIN I PNL SERPL HS: 39 NG/L (ref 0–34)
CHLORIDE BLDV-SCNC: 97 MMOL/L (ref 98–107)
CHLORIDE SERPL-SCNC: 98 MMOL/L (ref 98–107)
CO2 SERPL-SCNC: 27 MMOL/L (ref 21–32)
CREAT SERPL-MCNC: 5.7 MG/DL (ref 0.5–1.05)
EGFRCR SERPLBLD CKD-EPI 2021: 8 ML/MIN/1.73M*2
EOSINOPHIL # BLD AUTO: 0.52 X10*3/UL (ref 0–0.7)
EOSINOPHIL NFR BLD AUTO: 10.3 %
ERYTHROCYTE [DISTWIDTH] IN BLOOD BY AUTOMATED COUNT: 19.7 % (ref 11.5–14.5)
GLUCOSE BLDV-MCNC: 124 MG/DL (ref 74–99)
GLUCOSE SERPL-MCNC: 109 MG/DL (ref 74–99)
HCO3 BLDV-SCNC: 30.8 MMOL/L (ref 22–26)
HCT VFR BLD AUTO: 37.2 % (ref 36–46)
HCT VFR BLD EST: 37 % (ref 36–46)
HGB BLD-MCNC: 11.6 G/DL (ref 12–16)
HGB BLDV-MCNC: 12.2 G/DL (ref 12–16)
HOLD SPECIMEN: NORMAL
IMM GRANULOCYTES # BLD AUTO: 0.02 X10*3/UL (ref 0–0.7)
IMM GRANULOCYTES NFR BLD AUTO: 0.4 % (ref 0–0.9)
LACTATE BLDV-SCNC: 2 MMOL/L (ref 0.4–2)
LYMPHOCYTES # BLD AUTO: 1.12 X10*3/UL (ref 1.2–4.8)
LYMPHOCYTES NFR BLD AUTO: 22.2 %
MCH RBC QN AUTO: 29.9 PG (ref 26–34)
MCHC RBC AUTO-ENTMCNC: 31.2 G/DL (ref 32–36)
MCV RBC AUTO: 96 FL (ref 80–100)
MONOCYTES # BLD AUTO: 0.3 X10*3/UL (ref 0.1–1)
MONOCYTES NFR BLD AUTO: 6 %
NEUTROPHILS # BLD AUTO: 3.02 X10*3/UL (ref 1.2–7.7)
NEUTROPHILS NFR BLD AUTO: 59.9 %
NRBC BLD-RTO: 0 /100 WBCS (ref 0–0)
OXYHGB MFR BLDV: 45.8 % (ref 45–75)
PCO2 BLDV: 57 MM HG (ref 41–51)
PH BLDV: 7.34 PH (ref 7.33–7.43)
PLATELET # BLD AUTO: 185 X10*3/UL (ref 150–450)
PO2 BLDV: 35 MM HG (ref 35–45)
POTASSIUM BLDV-SCNC: 5.7 MMOL/L (ref 3.5–5.3)
POTASSIUM SERPL-SCNC: 5.7 MMOL/L (ref 3.5–5.3)
PROT SERPL-MCNC: 8.1 G/DL (ref 6.4–8.2)
RBC # BLD AUTO: 3.88 X10*6/UL (ref 4–5.2)
SAO2 % BLDV: 46 % (ref 45–75)
SODIUM BLDV-SCNC: 136 MMOL/L (ref 136–145)
SODIUM SERPL-SCNC: 138 MMOL/L (ref 136–145)
WBC # BLD AUTO: 5 X10*3/UL (ref 4.4–11.3)

## 2024-12-03 PROCEDURE — 2500000001 HC RX 250 WO HCPCS SELF ADMINISTERED DRUGS (ALT 637 FOR MEDICARE OP)

## 2024-12-03 PROCEDURE — 5A09357 ASSISTANCE WITH RESPIRATORY VENTILATION, LESS THAN 24 CONSECUTIVE HOURS, CONTINUOUS POSITIVE AIRWAY PRESSURE: ICD-10-PCS | Performed by: STUDENT IN AN ORGANIZED HEALTH CARE EDUCATION/TRAINING PROGRAM

## 2024-12-03 PROCEDURE — 84484 ASSAY OF TROPONIN QUANT: CPT | Performed by: STUDENT IN AN ORGANIZED HEALTH CARE EDUCATION/TRAINING PROGRAM

## 2024-12-03 PROCEDURE — 84132 ASSAY OF SERUM POTASSIUM: CPT | Performed by: STUDENT IN AN ORGANIZED HEALTH CARE EDUCATION/TRAINING PROGRAM

## 2024-12-03 PROCEDURE — 85025 COMPLETE CBC W/AUTO DIFF WBC: CPT | Performed by: STUDENT IN AN ORGANIZED HEALTH CARE EDUCATION/TRAINING PROGRAM

## 2024-12-03 PROCEDURE — 84132 ASSAY OF SERUM POTASSIUM: CPT

## 2024-12-03 PROCEDURE — 36415 COLL VENOUS BLD VENIPUNCTURE: CPT

## 2024-12-03 PROCEDURE — 71045 X-RAY EXAM CHEST 1 VIEW: CPT

## 2024-12-03 PROCEDURE — 83880 ASSAY OF NATRIURETIC PEPTIDE: CPT

## 2024-12-03 PROCEDURE — 2500000004 HC RX 250 GENERAL PHARMACY W/ HCPCS (ALT 636 FOR OP/ED): Mod: JG

## 2024-12-03 PROCEDURE — 71045 X-RAY EXAM CHEST 1 VIEW: CPT | Performed by: STUDENT IN AN ORGANIZED HEALTH CARE EDUCATION/TRAINING PROGRAM

## 2024-12-03 PROCEDURE — 94660 CPAP INITIATION&MGMT: CPT

## 2024-12-03 PROCEDURE — 99285 EMERGENCY DEPT VISIT HI MDM: CPT | Performed by: STUDENT IN AN ORGANIZED HEALTH CARE EDUCATION/TRAINING PROGRAM

## 2024-12-03 PROCEDURE — 2500000002 HC RX 250 W HCPCS SELF ADMINISTERED DRUGS (ALT 637 FOR MEDICARE OP, ALT 636 FOR OP/ED): Performed by: STUDENT IN AN ORGANIZED HEALTH CARE EDUCATION/TRAINING PROGRAM

## 2024-12-03 PROCEDURE — 96375 TX/PRO/DX INJ NEW DRUG ADDON: CPT

## 2024-12-03 PROCEDURE — 2500000005 HC RX 250 GENERAL PHARMACY W/O HCPCS

## 2024-12-03 PROCEDURE — 96374 THER/PROPH/DIAG INJ IV PUSH: CPT

## 2024-12-03 RX ORDER — NITROGLYCERIN 20 MG/100ML
5-400 INJECTION INTRAVENOUS CONTINUOUS
Status: DISCONTINUED | OUTPATIENT
Start: 2024-12-03 | End: 2024-12-04

## 2024-12-03 RX ORDER — NITROGLYCERIN 20 MG/100ML
INJECTION INTRAVENOUS
Status: COMPLETED
Start: 2024-12-03 | End: 2024-12-03

## 2024-12-03 RX ORDER — ACETAMINOPHEN 325 MG/1
975 TABLET ORAL ONCE
Status: COMPLETED | OUTPATIENT
Start: 2024-12-03 | End: 2024-12-03

## 2024-12-03 RX ORDER — FUROSEMIDE 10 MG/ML
80 INJECTION INTRAMUSCULAR; INTRAVENOUS ONCE
Status: COMPLETED | OUTPATIENT
Start: 2024-12-03 | End: 2024-12-03

## 2024-12-03 RX ORDER — IPRATROPIUM BROMIDE AND ALBUTEROL SULFATE 2.5; .5 MG/3ML; MG/3ML
9 SOLUTION RESPIRATORY (INHALATION) ONCE
Status: COMPLETED | OUTPATIENT
Start: 2024-12-03 | End: 2024-12-03

## 2024-12-03 RX ORDER — NITROGLYCERIN 0.4 MG/1
1.2 TABLET SUBLINGUAL ONCE
Status: COMPLETED | OUTPATIENT
Start: 2024-12-03 | End: 2024-12-03

## 2024-12-03 RX ORDER — NITROGLYCERIN 0.4 MG/1
TABLET SUBLINGUAL
Status: COMPLETED
Start: 2024-12-03 | End: 2024-12-03

## 2024-12-03 RX ORDER — FUROSEMIDE 10 MG/ML
INJECTION INTRAMUSCULAR; INTRAVENOUS
Status: COMPLETED
Start: 2024-12-03 | End: 2024-12-03

## 2024-12-03 ASSESSMENT — PAIN SCALES - GENERAL: PAINLEVEL_OUTOF10: 5 - MODERATE PAIN

## 2024-12-03 ASSESSMENT — PAIN DESCRIPTION - LOCATION: LOCATION: HEAD

## 2024-12-04 ENCOUNTER — APPOINTMENT (OUTPATIENT)
Dept: CARE COORDINATION | Facility: CLINIC | Age: 54
End: 2024-12-04
Payer: COMMERCIAL

## 2024-12-04 ENCOUNTER — DOCUMENTATION (OUTPATIENT)
Dept: BEHAVIORAL HEALTH | Facility: CLINIC | Age: 54
End: 2024-12-04

## 2024-12-04 ENCOUNTER — HOME CARE VISIT (OUTPATIENT)
Dept: HOME HEALTH SERVICES | Facility: HOME HEALTH | Age: 54
End: 2024-12-04
Payer: COMMERCIAL

## 2024-12-04 PROBLEM — R06.02 SHORTNESS OF BREATH: Status: ACTIVE | Noted: 2024-12-04

## 2024-12-04 LAB
ANION GAP BLDV CALCULATED.4IONS-SCNC: 7 MMOL/L (ref 10–25)
BASE EXCESS BLDV CALC-SCNC: 6.5 MMOL/L (ref -2–3)
BNP SERPL-MCNC: 596 PG/ML (ref 0–99)
BODY TEMPERATURE: 37 DEGREES CELSIUS
CA-I BLDV-SCNC: 0.98 MMOL/L (ref 1.1–1.33)
CARDIAC TROPONIN I PNL SERPL HS: 44 NG/L (ref 0–34)
CHLORIDE BLDV-SCNC: 99 MMOL/L (ref 98–107)
GLUCOSE BLD MANUAL STRIP-MCNC: 128 MG/DL (ref 74–99)
GLUCOSE BLDV-MCNC: 155 MG/DL (ref 74–99)
HCO3 BLDV-SCNC: 31.8 MMOL/L (ref 22–26)
HCT VFR BLD EST: 28 % (ref 36–46)
HGB BLDV-MCNC: 9.3 G/DL (ref 12–16)
LACTATE BLDV-SCNC: 1.2 MMOL/L (ref 0.4–2)
OXYHGB MFR BLDV: 72.2 % (ref 45–75)
PCO2 BLDV: 49 MM HG (ref 41–51)
PH BLDV: 7.42 PH (ref 7.33–7.43)
PO2 BLDV: 48 MM HG (ref 35–45)
POTASSIUM BLDV-SCNC: 7.2 MMOL/L (ref 3.5–5.3)
SAO2 % BLDV: 74 % (ref 45–75)
SODIUM BLDV-SCNC: 131 MMOL/L (ref 136–145)

## 2024-12-04 PROCEDURE — 2500000004 HC RX 250 GENERAL PHARMACY W/ HCPCS (ALT 636 FOR OP/ED)

## 2024-12-04 PROCEDURE — 2500000001 HC RX 250 WO HCPCS SELF ADMINISTERED DRUGS (ALT 637 FOR MEDICARE OP): Performed by: NURSE PRACTITIONER

## 2024-12-04 PROCEDURE — 94640 AIRWAY INHALATION TREATMENT: CPT

## 2024-12-04 PROCEDURE — 2500000005 HC RX 250 GENERAL PHARMACY W/O HCPCS

## 2024-12-04 PROCEDURE — 94660 CPAP INITIATION&MGMT: CPT

## 2024-12-04 PROCEDURE — 99291 CRITICAL CARE FIRST HOUR: CPT

## 2024-12-04 PROCEDURE — 36415 COLL VENOUS BLD VENIPUNCTURE: CPT | Performed by: STUDENT IN AN ORGANIZED HEALTH CARE EDUCATION/TRAINING PROGRAM

## 2024-12-04 PROCEDURE — 2060000001 HC INTERMEDIATE ICU ROOM DAILY

## 2024-12-04 PROCEDURE — 2500000001 HC RX 250 WO HCPCS SELF ADMINISTERED DRUGS (ALT 637 FOR MEDICARE OP)

## 2024-12-04 PROCEDURE — 97162 PT EVAL MOD COMPLEX 30 MIN: CPT | Mod: GP

## 2024-12-04 PROCEDURE — 84484 ASSAY OF TROPONIN QUANT: CPT | Performed by: STUDENT IN AN ORGANIZED HEALTH CARE EDUCATION/TRAINING PROGRAM

## 2024-12-04 PROCEDURE — 5A1D70Z PERFORMANCE OF URINARY FILTRATION, INTERMITTENT, LESS THAN 6 HOURS PER DAY: ICD-10-PCS | Performed by: STUDENT IN AN ORGANIZED HEALTH CARE EDUCATION/TRAINING PROGRAM

## 2024-12-04 PROCEDURE — 2500000002 HC RX 250 W HCPCS SELF ADMINISTERED DRUGS (ALT 637 FOR MEDICARE OP, ALT 636 FOR OP/ED)

## 2024-12-04 PROCEDURE — 96374 THER/PROPH/DIAG INJ IV PUSH: CPT

## 2024-12-04 PROCEDURE — 82947 ASSAY GLUCOSE BLOOD QUANT: CPT

## 2024-12-04 PROCEDURE — 99222 1ST HOSP IP/OBS MODERATE 55: CPT

## 2024-12-04 PROCEDURE — 84132 ASSAY OF SERUM POTASSIUM: CPT

## 2024-12-04 PROCEDURE — 97165 OT EVAL LOW COMPLEX 30 MIN: CPT | Mod: GO

## 2024-12-04 RX ORDER — VALSARTAN 320 MG/1
320 TABLET ORAL EVERY EVENING
Status: DISPENSED | OUTPATIENT
Start: 2024-12-04

## 2024-12-04 RX ORDER — NIFEDIPINE 90 MG/1
90 TABLET, EXTENDED RELEASE ORAL
Status: DISCONTINUED | OUTPATIENT
Start: 2024-12-04 | End: 2024-12-05

## 2024-12-04 RX ORDER — FLUTICASONE FUROATE AND VILANTEROL 100; 25 UG/1; UG/1
1 POWDER RESPIRATORY (INHALATION)
Status: DISPENSED | OUTPATIENT
Start: 2024-12-04

## 2024-12-04 RX ORDER — HEPARIN SODIUM 10000 [USP'U]/100ML
0-4000 INJECTION, SOLUTION INTRAVENOUS CONTINUOUS
Status: DISCONTINUED | OUTPATIENT
Start: 2024-12-04 | End: 2024-12-04

## 2024-12-04 RX ORDER — CYCLOBENZAPRINE HCL 10 MG
10 TABLET ORAL 3 TIMES DAILY PRN
Status: DISPENSED | OUTPATIENT
Start: 2024-12-04

## 2024-12-04 RX ORDER — ALBUTEROL SULFATE 0.83 MG/ML
1.25 SOLUTION RESPIRATORY (INHALATION) EVERY 6 HOURS PRN
Status: ACTIVE | OUTPATIENT
Start: 2024-12-04

## 2024-12-04 RX ORDER — DOXAZOSIN 2 MG/1
2 TABLET ORAL DAILY
Status: DISCONTINUED | OUTPATIENT
Start: 2024-12-04 | End: 2024-12-05

## 2024-12-04 RX ORDER — VALSARTAN 320 MG/1
320 TABLET ORAL EVERY EVENING
Status: DISCONTINUED | OUTPATIENT
Start: 2024-12-04 | End: 2024-12-04

## 2024-12-04 RX ORDER — CLONIDINE 0.2 MG/24H
1 PATCH, EXTENDED RELEASE TRANSDERMAL DAILY
Status: DISCONTINUED | OUTPATIENT
Start: 2024-12-04 | End: 2024-12-05

## 2024-12-04 RX ORDER — HYDROXYZINE HYDROCHLORIDE 25 MG/1
25 TABLET, FILM COATED ORAL EVERY 6 HOURS PRN
Status: ACTIVE | OUTPATIENT
Start: 2024-12-04

## 2024-12-04 RX ORDER — ATORVASTATIN CALCIUM 80 MG/1
80 TABLET, FILM COATED ORAL NIGHTLY
Status: DISPENSED | OUTPATIENT
Start: 2024-12-04

## 2024-12-04 RX ORDER — NICARDIPINE HYDROCHLORIDE 0.2 MG/ML
2.5-15 INJECTION INTRAVENOUS CONTINUOUS
Status: DISCONTINUED | OUTPATIENT
Start: 2024-12-04 | End: 2024-12-04

## 2024-12-04 RX ORDER — DOXAZOSIN 4 MG/1
4 TABLET ORAL DAILY
Status: DISCONTINUED | OUTPATIENT
Start: 2024-12-04 | End: 2024-12-04

## 2024-12-04 RX ORDER — CLONIDINE 0.2 MG/24H
1 PATCH, EXTENDED RELEASE TRANSDERMAL DAILY
Status: DISCONTINUED | OUTPATIENT
Start: 2024-12-04 | End: 2024-12-04

## 2024-12-04 RX ORDER — POLYETHYLENE GLYCOL 3350 17 G/17G
17 POWDER, FOR SOLUTION ORAL DAILY
Status: DISPENSED | OUTPATIENT
Start: 2024-12-04

## 2024-12-04 RX ORDER — ASPIRIN 81 MG/1
81 TABLET ORAL DAILY
Status: DISPENSED | OUTPATIENT
Start: 2024-12-04

## 2024-12-04 RX ORDER — ISOSORBIDE MONONITRATE 60 MG/1
120 TABLET, EXTENDED RELEASE ORAL DAILY
Status: DISCONTINUED | OUTPATIENT
Start: 2024-12-04 | End: 2024-12-05

## 2024-12-04 RX ORDER — SUMATRIPTAN SUCCINATE 25 MG/1
25 TABLET ORAL ONCE AS NEEDED
Status: ACTIVE | OUTPATIENT
Start: 2024-12-04

## 2024-12-04 RX ORDER — FLUTICASONE PROPIONATE 50 MCG
2 SPRAY, SUSPENSION (ML) NASAL DAILY
Status: DISPENSED | OUTPATIENT
Start: 2024-12-04

## 2024-12-04 RX ORDER — PANTOPRAZOLE SODIUM 40 MG/1
40 TABLET, DELAYED RELEASE ORAL AS NEEDED
Status: DISCONTINUED | OUTPATIENT
Start: 2024-12-04 | End: 2024-12-04

## 2024-12-04 RX ORDER — ALBUTEROL SULFATE 90 UG/1
2 INHALANT RESPIRATORY (INHALATION) EVERY 4 HOURS PRN
Status: DISPENSED | OUTPATIENT
Start: 2024-12-04

## 2024-12-04 RX ORDER — HYDRALAZINE HYDROCHLORIDE 25 MG/1
100 TABLET, FILM COATED ORAL 3 TIMES DAILY
Status: DISCONTINUED | OUTPATIENT
Start: 2024-12-04 | End: 2024-12-05

## 2024-12-04 RX ORDER — CALCIUM ACETATE 667 MG/1
1334 CAPSULE ORAL
Status: DISPENSED | OUTPATIENT
Start: 2024-12-04

## 2024-12-04 RX ORDER — GABAPENTIN 300 MG/1
300 CAPSULE ORAL NIGHTLY
Status: DISPENSED | OUTPATIENT
Start: 2024-12-04

## 2024-12-04 RX ORDER — PANTOPRAZOLE SODIUM 40 MG/1
40 TABLET, DELAYED RELEASE ORAL
Status: DISPENSED | OUTPATIENT
Start: 2024-12-04

## 2024-12-04 RX ORDER — CARVEDILOL 25 MG/1
50 TABLET ORAL 2 TIMES DAILY
Status: DISCONTINUED | OUTPATIENT
Start: 2024-12-04 | End: 2024-12-05

## 2024-12-04 RX ORDER — CARVEDILOL 25 MG/1
50 TABLET ORAL 2 TIMES DAILY
Status: DISCONTINUED | OUTPATIENT
Start: 2024-12-04 | End: 2024-12-04

## 2024-12-04 RX ORDER — ISOSORBIDE MONONITRATE 30 MG/1
120 TABLET, EXTENDED RELEASE ORAL DAILY
Status: DISCONTINUED | OUTPATIENT
Start: 2024-12-04 | End: 2024-12-04

## 2024-12-04 SDOH — SOCIAL STABILITY: SOCIAL INSECURITY: DO YOU FEEL UNSAFE GOING BACK TO THE PLACE WHERE YOU ARE LIVING?: NO

## 2024-12-04 SDOH — ECONOMIC STABILITY: FOOD INSECURITY: WITHIN THE PAST 12 MONTHS, THE FOOD YOU BOUGHT JUST DIDN'T LAST AND YOU DIDN'T HAVE MONEY TO GET MORE.: NEVER TRUE

## 2024-12-04 SDOH — SOCIAL STABILITY: SOCIAL INSECURITY: HAVE YOU HAD THOUGHTS OF HARMING ANYONE ELSE?: NO

## 2024-12-04 SDOH — SOCIAL STABILITY: SOCIAL INSECURITY: WITHIN THE LAST YEAR, HAVE YOU BEEN AFRAID OF YOUR PARTNER OR EX-PARTNER?: NO

## 2024-12-04 SDOH — SOCIAL STABILITY: SOCIAL INSECURITY: ARE YOU OR HAVE YOU BEEN THREATENED OR ABUSED PHYSICALLY, EMOTIONALLY, OR SEXUALLY BY ANYONE?: NO

## 2024-12-04 SDOH — ECONOMIC STABILITY: FOOD INSECURITY: WITHIN THE PAST 12 MONTHS, YOU WORRIED THAT YOUR FOOD WOULD RUN OUT BEFORE YOU GOT THE MONEY TO BUY MORE.: NEVER TRUE

## 2024-12-04 SDOH — SOCIAL STABILITY: SOCIAL INSECURITY: ABUSE: ADULT

## 2024-12-04 SDOH — ECONOMIC STABILITY: HOUSING INSECURITY: IN THE PAST 12 MONTHS, HOW MANY TIMES HAVE YOU MOVED WHERE YOU WERE LIVING?: 0

## 2024-12-04 SDOH — SOCIAL STABILITY: SOCIAL INSECURITY: WITHIN THE LAST YEAR, HAVE YOU BEEN HUMILIATED OR EMOTIONALLY ABUSED IN OTHER WAYS BY YOUR PARTNER OR EX-PARTNER?: NO

## 2024-12-04 SDOH — ECONOMIC STABILITY: INCOME INSECURITY: IN THE PAST 12 MONTHS HAS THE ELECTRIC, GAS, OIL, OR WATER COMPANY THREATENED TO SHUT OFF SERVICES IN YOUR HOME?: NO

## 2024-12-04 SDOH — SOCIAL STABILITY: SOCIAL INSECURITY: DOES ANYONE TRY TO KEEP YOU FROM HAVING/CONTACTING OTHER FRIENDS OR DOING THINGS OUTSIDE YOUR HOME?: NO

## 2024-12-04 SDOH — ECONOMIC STABILITY: HOUSING INSECURITY: AT ANY TIME IN THE PAST 12 MONTHS, WERE YOU HOMELESS OR LIVING IN A SHELTER (INCLUDING NOW)?: NO

## 2024-12-04 SDOH — ECONOMIC STABILITY: HOUSING INSECURITY: IN THE LAST 12 MONTHS, WAS THERE A TIME WHEN YOU WERE NOT ABLE TO PAY THE MORTGAGE OR RENT ON TIME?: NO

## 2024-12-04 SDOH — ECONOMIC STABILITY: TRANSPORTATION INSECURITY: IN THE PAST 12 MONTHS, HAS LACK OF TRANSPORTATION KEPT YOU FROM MEDICAL APPOINTMENTS OR FROM GETTING MEDICATIONS?: NO

## 2024-12-04 SDOH — SOCIAL STABILITY: SOCIAL INSECURITY: WERE YOU ABLE TO COMPLETE ALL THE BEHAVIORAL HEALTH SCREENINGS?: YES

## 2024-12-04 SDOH — SOCIAL STABILITY: SOCIAL INSECURITY: HAS ANYONE EVER THREATENED TO HURT YOUR FAMILY OR YOUR PETS?: NO

## 2024-12-04 SDOH — SOCIAL STABILITY: SOCIAL INSECURITY: DO YOU FEEL ANYONE HAS EXPLOITED OR TAKEN ADVANTAGE OF YOU FINANCIALLY OR OF YOUR PERSONAL PROPERTY?: NO

## 2024-12-04 SDOH — ECONOMIC STABILITY: FOOD INSECURITY: HOW HARD IS IT FOR YOU TO PAY FOR THE VERY BASICS LIKE FOOD, HOUSING, MEDICAL CARE, AND HEATING?: NOT HARD AT ALL

## 2024-12-04 SDOH — SOCIAL STABILITY: SOCIAL INSECURITY: ARE THERE ANY APPARENT SIGNS OF INJURIES/BEHAVIORS THAT COULD BE RELATED TO ABUSE/NEGLECT?: NO

## 2024-12-04 ASSESSMENT — COGNITIVE AND FUNCTIONAL STATUS - GENERAL
CLIMB 3 TO 5 STEPS WITH RAILING: A LITTLE
TURNING FROM BACK TO SIDE WHILE IN FLAT BAD: A LITTLE
STANDING UP FROM CHAIR USING ARMS: A LITTLE
MOBILITY SCORE: 23
HELP NEEDED FOR BATHING: A LITTLE
DRESSING REGULAR LOWER BODY CLOTHING: A LITTLE
WALKING IN HOSPITAL ROOM: A LITTLE
DAILY ACTIVITIY SCORE: 24
MOBILITY SCORE: 22
MOBILITY SCORE: 19
DAILY ACTIVITIY SCORE: 20
TOILETING: A LITTLE
MOVING TO AND FROM BED TO CHAIR: A LITTLE
WALKING IN HOSPITAL ROOM: A LITTLE
PATIENT BASELINE BEDBOUND: NO
DAILY ACTIVITIY SCORE: 24
CLIMB 3 TO 5 STEPS WITH RAILING: A LITTLE
CLIMB 3 TO 5 STEPS WITH RAILING: A LITTLE
DRESSING REGULAR UPPER BODY CLOTHING: A LITTLE

## 2024-12-04 ASSESSMENT — PAIN SCALES - GENERAL
PAINLEVEL_OUTOF10: 0 - NO PAIN
PAINLEVEL_OUTOF10: 0 - NO PAIN
PAINLEVEL_OUTOF10: 1
PAINLEVEL_OUTOF10: 0 - NO PAIN

## 2024-12-04 ASSESSMENT — ACTIVITIES OF DAILY LIVING (ADL)
JUDGMENT_ADEQUATE_SAFELY_COMPLETE_DAILY_ACTIVITIES: YES
ADEQUATE_TO_COMPLETE_ADL: YES
DRESSING YOURSELF: INDEPENDENT
TOILETING: INDEPENDENT
JUDGMENT_ADEQUATE_SAFELY_COMPLETE_DAILY_ACTIVITIES: YES
HEARING - LEFT EAR: FUNCTIONAL
LACK_OF_TRANSPORTATION: NO
ADEQUATE_TO_COMPLETE_ADL: YES
PATIENT'S MEMORY ADEQUATE TO SAFELY COMPLETE DAILY ACTIVITIES?: YES
ADL_ASSISTANCE: INDEPENDENT
BATHING_ASSISTANCE: STAND BY
FEEDING YOURSELF: INDEPENDENT
PATIENT'S MEMORY ADEQUATE TO SAFELY COMPLETE DAILY ACTIVITIES?: YES
LACK_OF_TRANSPORTATION: NO
HEARING - RIGHT EAR: FUNCTIONAL
GROOMING: INDEPENDENT
WALKS IN HOME: INDEPENDENT
LACK_OF_TRANSPORTATION: NO
ADL_ASSISTANCE: INDEPENDENT
BATHING: INDEPENDENT

## 2024-12-04 ASSESSMENT — LIFESTYLE VARIABLES
HOW OFTEN DO YOU HAVE 6 OR MORE DRINKS ON ONE OCCASION: NEVER
HOW OFTEN DO YOU HAVE A DRINK CONTAINING ALCOHOL: NEVER
HOW MANY STANDARD DRINKS CONTAINING ALCOHOL DO YOU HAVE ON A TYPICAL DAY: PATIENT DOES NOT DRINK
AUDIT-C TOTAL SCORE: 0
SKIP TO QUESTIONS 9-10: 1
AUDIT-C TOTAL SCORE: 0

## 2024-12-04 ASSESSMENT — PAIN - FUNCTIONAL ASSESSMENT
PAIN_FUNCTIONAL_ASSESSMENT: 0-10
PAIN_FUNCTIONAL_ASSESSMENT: CPOT (CRITICAL CARE PAIN OBSERVATION TOOL)
PAIN_FUNCTIONAL_ASSESSMENT: 0-10

## 2024-12-04 NOTE — PROGRESS NOTES
"Nutrition Follow-up   Dietitian 8 wks/2 mo follow-up. Pt is being seen Virtual, as phone only for  renal diet edu     Labs  Lab Results   Component Value Date    HGBA1C 4.7 11/09/2024    HGBA1C 5.2 06/29/2024    HGBA1C 3.7 03/29/2024     12/03/2024    K 5.7 (H) 12/03/2024    CL 98 12/03/2024    CO2 27 12/03/2024    BUN 28 (H) 12/03/2024    CREATININE 5.70 (H) 12/03/2024    CALCIUM 8.8 12/03/2024    ALBUMIN 4.5 12/03/2024    PROT 8.1 12/03/2024    BILITOT 0.6 12/03/2024    ALKPHOS 155 (H) 12/03/2024    ALT 25 12/03/2024    AST 76 (H) 12/03/2024    GLUCOSE 109 (H) 12/03/2024    BHYDRXBUT 0.08 12/16/2019    CPEPTIDE 10.0 (H) 03/29/2024       Lab Results   Component Value Date    CHOL 108 11/09/2024    LDLCALC 54 11/09/2024    TRIG 46 11/09/2024    HDL 45.1 11/09/2024    LDLF 99 12/12/2020          Comments:  Reviewed, above labs were taken in ED after dialysis     Anthropometrics  Ht Readings from Last 1 Encounters:   12/04/24 1.397 m (4' 7\")       BMI Readings from Last 1 Encounters:   12/04/24 31.15 kg/m²       Wt Readings from Last 10 Encounters:   12/04/24 60.8 kg (134 lb 0.6 oz)   12/01/24 52.6 kg (116 lb)   11/26/24 52.6 kg (116 lb)   11/25/24 52.6 kg (116 lb)   11/24/24 52.2 kg (115 lb)   11/19/24 53.5 kg (117 lb 15.1 oz)   11/08/24 55.4 kg (122 lb 1.6 oz)   11/06/24 47.2 kg (104 lb)   11/01/24 53.1 kg (117 lb)   10/24/24 57.6 kg (126 lb 15.8 oz)       Weight change:  CBW shows sig wt gain x 1, 3 and 6 mo. This weight was from  ED after dialysis     Visit Summary    Pt nephrologist believes pt is not receiving full HD treatment when she is at [facility] in [place]. This could be due to pt's fear and anxiety over her blood pressure going too low.     Spoke to pt today while she was in the hosp. Doesn't remember what this RD rec'd for daily Na intake. Reminded pt that she shouldn't exceed 1500mg Na/day. Reviewed label reading strategies the best we could given pt is currently in the hospital.     Reviewed some of the things " pt has been eating recently. Make a turkey salad sandwich with leftover turkey from thanksgiving, connors, mustard, peppers. Has been sticking with salt free seasonings. Since pt love to cook this RD suggested tracking Na content of the ingredients as she uses them while cooking. This could be leading to unintentional excessive Na intake. Is doing well following her fluid restriction.     Nutrition Diagnosis:  Diagnosis Statement 1:  Diagnosis Status: Ongoing  Diagnosis : Altered nutrition related lab values  related to  progressing CKD  as evidenced by  elevated renal labs    Diagnosis Statement 2:  Diagnosis Status: New  Diagnosis : Unbalanced Diet Pattern related to  excessive Na intake  as evidenced by  pt may be using excessive amounts of Na while cooking    Nutrition Goals    Track Na in ingredients while cooking   2. Do not exceed 1500 mg Na/day     Follow up Plan  Will follow-up x 2 wk

## 2024-12-04 NOTE — H&P
History Of Present Illness  Stacey Heath is a 54 y.o. female presenting with ***.           Upon arrival to the unit:  - Vitals:   T 37.1, , /97, RR 20, BIPAP 18/8 11L  - Labs:   VBG 2118: pH 7.34, pCO2 57H, lactate 2.0   Repeat VBG 0111: pH 7.42, pCO2 49, lactate 1.2  CBC: WBC 5.0, Hgb 11.6, plt 185   BMP: Na 138, K 5.7, Cl 98, HCO3 27, BUN 28, Cr 5.70, glu 109   LFT: Ca 8.8, tprot 8.1, alb 4.5, alkphos 155, AST 76, ALT 25, tbili 0.6   hs-TnI 39->44, BNP 596H, lactate 2.0     - Imaging:   CXR:  1. Prominence of the interstitial markings likely representing  pulmonary edema, slightly improved compared to 11/26/2024.  2. Small left-greater-than-right pleural effusions and adjacent  atelectasis.      -ED interventions: Tylenol 975mg, Lasix 80mg IVP, duoneb, nitroglycerin 1.2mg, nitroglycerin gtt, nicardipine gtt  Placed on BIPAP    ED course:  Patient was noted to be in hypertensive emergency versus SCA PE.  Patient was placed on a nitro drip, given Lasix, and placed on BiPAP.  Patient had improvement of symptoms and repeat VBG showed improvement in CO2 that downtrended from 57-49.  Patient was taken off of BiPAP around 2:45 AM by myself given significant improvement in terms of work of breathing.  Patient was started on Cardene in addition to nitro given resistant blood pressures. Patient is initially attempted to get admitted to the CICU however they are full.  CICU fellow did recommend trying to transition the patient to Cardene as a may improve her blood pressures better.  Her baseline blood pressures run between 160-170 systolic at baseline. MICU was available and accepted the patient for further workup and management. Given inability to transition her to long-acting oral medications, patient was sent to the MICU for further workup and management of patient's hypertensive emergency.        Past Medical History  She has a past medical history of Bacteremia due to methicillin resistant Staphylococcus  aureus (07/08/2024), Cardiac/pericardial tamponade (01/20/2024), CHF (congestive heart failure), COPD (chronic obstructive pulmonary disease) (Multi), Coronary artery disease, Disorder of sweat glands (02/25/2023), Dry eye syndrome of bilateral lacrimal glands (03/07/2017), ESRD (end stage renal disease) (Multi), Essential (primary) hypertension (12/27/2022), History of acute pancreatitis (12/21/2020), Low grade squamous intraepithelial lesion (LGSIL) on cervicovaginal cytologic smear (02/25/2023), Migraines, 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 (02/25/2023), and Type 2 myocardial infarction (Multi) (01/20/2024).    Surgical History  She has a past surgical history that includes Tubal ligation (03/07/2017); Other surgical history (03/07/2017); Appendectomy (03/07/2017); Other surgical history (03/07/2017); Other surgical history (12/08/2021); Other surgical history (12/08/2021); and CT angio abdomen w and or wo IV IV contrast (4/23/2023).     Social History  She reports that she has quit smoking. Her smoking use included cigarettes. She has been exposed to tobacco smoke. She has never used smokeless tobacco. She reports that she does not currently use alcohol. She reports that she does not currently use drugs after having used the following drugs: Cocaine and Marijuana.    Family History  Family History   Problem Relation Name Age of Onset    Other (Cerebrovascular Accident) Father      Heart failure Paternal Grandmother      Breast cancer Paternal Grandmother      Other (Primary Cervical Cancer) Paternal Grandmother      Diabetes Paternal Grandfather      Breast cancer Father's Sister      Ovarian cancer Father's Sister          Allergies  Iodine, Bee pollen, Bioflavonoids, Citrus and derivatives, Codeine, Flowers, and Shellfish containing products    Review of Systems   ***    Physical Exam   ***    Last Recorded Vitals  /74   Pulse 89   Temp 37.1 °C (98.7 °F)   Resp 20   SpO2 96%      Assessment/Plan    Assessment & Plan  Shortness of breath      ***       Yen Casey MD

## 2024-12-04 NOTE — PROGRESS NOTES
Pharmacy Medication History Review    Stacey Heath is a 54 y.o. female admitted for Shortness of breath. Pharmacy reviewed the patient's [id number] medications and allergies for accuracy.    Medications ADDED:  N/A  Medications CHANGED:  Docusate sodium 100 mg: take 1 capsule by mouth 2 times a day as needed for constipation   Medications REMOVED:   N/A     The list below reflects the updated PTA list.   Prior to Admission Medications   Prescriptions Last Dose Informant   NIFEdipine ER (Adalat CC) 90 mg 24 hr tablet  Self   Sig: Take 1 tablet (90 mg) by mouth once daily in the morning. Take before meals. Do not crush, chew, or split.   SUMAtriptan (Imitrex) 25 mg tablet  Self   Sig: Take 1 tablet (25 mg) by mouth 1 time if needed for migraine. May repeat dose once in 2 hours if no relief.  Do not exceed 2 doses in 24 hours.   acetaminophen (Tylenol) 500 mg tablet  Self   Sig: Take 2 tablets (1,000 mg) by mouth every 8 hours if needed for mild pain (1 - 3) for up to 10 days.   albuterol (ProAir HFA) 90 mcg/actuation inhaler  Self   Sig: Inhale 2 puffs every 4 hours if needed for wheezing or shortness of breath.   albuterol 1.25 mg/3 mL nebulizer solution  Self   Sig: Take 3 mL (1.25 mg) by nebulization every 6 hours if needed for wheezing or shortness of breath.   apixaban (Eliquis) 5 mg tablet  Self   Sig: Take 1 tablet (5 mg) by mouth 2 times a day.   aspirin 81 mg EC tablet  Self   Sig: Take 1 tablet (81 mg) by mouth once daily.   atorvastatin (Lipitor) 80 mg tablet  Self   Sig: Take 1 tablet (80 mg) by mouth once daily at bedtime.   calcium acetate (Phoslo) 667 mg capsule  Self   Sig: Take 2 capsules (1,334 mg) by mouth 3 times daily (morning, midday, late afternoon).   carvedilol (Coreg) 25 mg tablet  Self   Sig: Take 2 tablets (50 mg) by mouth 2 times a day.   cloNIDine (Catapres-TTS) 0.2 mg/24 hr patch  Self   Sig: Apply one patch on the skin and replace every 7 days, as directed. Do not start before  October 31, 2024.   cyclobenzaprine (Flexeril) 10 mg tablet  Self   Sig: Take 1 tablet (10 mg) by mouth 3 times a day as needed for muscle spasms for up to 7 days.   docusate sodium (Colace) 100 mg capsule  Self   Sig: Take 1 capsule (100 mg) by mouth every 12 hours.   Patient taking differently: Take 1 capsule (100 mg) by mouth 2 times a day as needed for constipation.   epoetin joceline (Epogen,Procrit) 10,000 unit/mL injection  Self   Sig: Inject 1 mL (10,000 Units) under the skin 3 times a week.   fluticasone (Flonase) 50 mcg/actuation nasal spray  Self   Sig: Administer 2 sprays into each nostril once daily. Shake gently. Before first use, prime pump. After use, clean tip and replace cap.   fluticasone furoate-vilanteroL (Breo Ellipta) 100-25 mcg/dose inhaler  Self   Sig: Inhale 1 puff once daily.   gabapentin (Neurontin) 300 mg capsule  Self   Sig: Take 1 capsule (300 mg) by mouth once daily at bedtime.   hydrALAZINE (Apresoline) 100 mg tablet  Self   Sig: Take 1 tablet (100 mg) by mouth 3 times a day.   hydrOXYzine HCL (Atarax) 25 mg tablet  Self   Sig: Take 1 tablet (25 mg) by mouth every 6 hours if needed for anxiety, allergies or itching.   isosorbide mononitrate ER (Imdur) 120 mg 24 hr tablet  Self   Sig: Take 1 tablet (120 mg) by mouth once daily. Do not crush or chew.   pantoprazole (ProtoNix) 40 mg EC tablet  Self   Sig: Take 1 tablet (40 mg) by mouth if needed (not regular take it). Do not crush, chew, or split.   polyethylene glycol (Glycolax, Miralax) 17 gram/dose powder  Self   Sig: Take 17 g (1 scoop dissolved in liquid) by mouth once daily. Do not start before July 26, 2024.   torsemide (Demadex) 20 mg tablet  Self   Sig: Take 2 tablets once daily on non-dialysis days only. Do not take on dialysis days   valsartan (Diovan) 320 mg tablet  Self   Sig: Take 1 tablet (320 mg) by mouth once daily in the evening.   vitamin B complex-vitamin C-folic acid (Nephrocaps) 1 mg capsule  Self   Sig: Take 1 capsule  by mouth once daily.      Facility-Administered Medications: None       The list below reflects the updated allergy list. Please review each documented allergy for additional clarification and justification.  Allergies  Reviewed by Cecil Parr RN on 12/3/2024        Severity Reactions Comments    Iodine High Hives, Itching, Unknown     Bee Pollen Not Specified Unknown     Bioflavonoids Not Specified Swelling     Citrus And Derivatives Not Specified Unknown     Codeine Not Specified Itching, Hives, Unknown Tolerates percocet   Tolerates percocet Tolerates percocet    Flowers Not Specified Itching     Shellfish Containing Products Not Specified Swelling SEAFOOD            Patient accepts M2B at discharge. Pharmacy has been updated to [facility].    Sources  [facility]  Pharmacy dispense history  Patient interview  Patient reported no changes to medication list since discharge on 11/20/24  Chart Review   Discharge summary 11/20/24  Medication reconciliation 11/27/24    Additional Comments  N/A    Elie Butterfield PharmD  Transitions of Care Pharmacist  [facility]  Please reach out via Secure Chat for questions, or if no response call [place] or HDmessaging [facility]

## 2024-12-04 NOTE — PROGRESS NOTES
Physical Therapy    Physical Therapy Evaluation    Patient Name: Stacey Heath  MRN: [id number]  Department: [facility] MICU  Room: 30/30-A  Today's Date: 12/4/2024   Time Calculation  Start Time: 0938  Stop Time: 0950  Time Calculation (min): 12 min    Assessment/Plan   PT Assessment  PT Assessment Results: Decreased strength, Decreased endurance, Impaired balance, Decreased mobility  Rehab Prognosis: Excellent  Barriers to Discharge: in ICU  Evaluation/Treatment Tolerance: Patient tolerated treatment well  Strengths: Ability to acquire knowledge  End of Session Communication: Bedside nurse  Assessment Comment: 53 y/o F with hx multiple hospital admissions, presents with HTN emergency, SOB and hypoxia; patient demo'd impaired functional mobility, endurance and balance. Would benefit from continued skilled PT services throughout hospital stay and upon DC to address impairments as stated above  End of Session Patient Position: Bed, 3 rail up, Alarm off, not on at start of session  IP OR SWING BED PT PLAN  Inpatient or Swing Bed: Inpatient  PT Plan  Treatment/Interventions: Bed mobility, Transfer training, Gait training, Stair training, Balance training, Strengthening, Endurance training, Therapeutic exercise, Therapeutic activity, Positioning, Postural re-education  PT Plan: Ongoing PT  PT Frequency: 3 times per week  PT Discharge Recommendations: Low intensity level of continued care  PT Recommended Transfer Status: Stand by assist  PT - OK to Discharge: Yes    Subjective   General Visit Information:  General  Reason for Referral: SOb, hypoxia; HTN emergency, on cardene gtt, now off gtt.  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  Family/Caregiver Present: No  Prior to Session Communication: Bedside nurse  Patient Position Received: Bed, 3 rail up, Alarm off, not on at start of session  General Comment: patient  received supine, HOB elevated, agreeable to participate in therapy. Rn aware of BP, cleared for patient to be seen by therapy.  Home Living:  Home Living  Type of Home: House  Home Adaptive Equipment: Cane, Walker rolling or standard  Home Layout: Two level, Laundry in basement, Bed/bath upstairs  Home Access: Stairs to enter with rails  Entrance Stairs-Rails:  (x1)  Entrance Stairs-Number of Steps: 5-6  Bathroom Shower/Tub: Tub/shower unit  Prior Level of Function:  Prior Function Per Pt/Caregiver Report  Level of Foard: Independent with ADLs and functional transfers, Needs assistance with homemaking (family completes laundry, patient indep with cooking)  Receives Help From: Family  ADL Assistance: Independent  Homemaking Assistance: Needs assistance (indep cooking, family completes laundry)  Driving/Transportation:  (reports that she can drive but has people that can drive her)  Ambulatory Assistance: Independent (reports indep household ambulation, mentioned occasionally holding onto walls/furniture; limited community distances d/t increase SOB)  Vocational: On disability  Hand Dominance: Right  Prior Function Comments: reports some SOB with stair negotiation  Precautions:  Precautions  Hearing/Visual Limitations: hearing appears WFL, glasses  Medical Precautions: Fall precautions       Vital Signs     Vitals Session Pre PT During PT Post PT   Heart Rate (BPM) 90  100   Resp (RR) 23  23   SpO2 % 97  96   /87 214/86 202/84   MAP (mmHg)      Per RN, medical team wants SBP <200, RN is aware and was okay with therapy proceeding. Patient denied SOB, chest pain this date. HR stable.             Objective   Pain:  Pain Assessment  Pain Assessment: 0-10  0-10 (Numeric) Pain Score:  (did not report)  Cognition:  Cognition  Overall Cognitive Status: Within Functional Limits  Arousal/Alertness: Appropriate responses to stimuli  Orientation Level:  (AxO x3)  Following Commands: Follows one step commands  consistently    General Assessments:      Activity Tolerance  Early Mobility/Exercise Safety Screen: Proceed with mobilization - No exclusion criteria met    Sensation  Sensation Comment: reports neuropathy in B feet    Strength  Strength Comments: BUEs/BLEs appear grossly WFL  Postural Control  Postural Control: Within Functional Limits    Static Sitting Balance  Static Sitting-Balance Support: Feet supported, Bilateral upper extremity supported  Static Sitting-Level of Assistance: Distant supervision  Static Sitting-Comment/Number of Minutes: x1  Dynamic Sitting Balance  Dynamic Sitting-Balance Support: Feet supported, Bilateral upper extremity supported  Dynamic Sitting-Level of Assistance: Close supervision  Dynamic Sitting-Comments: x1    Static Standing Balance  Static Standing-Balance Support: No upper extremity supported  Static Standing-Level of Assistance: Close supervision  Static Standing-Comment/Number of Minutes: x1  Dynamic Standing Balance  Dynamic Standing-Balance Support: No upper extremity supported  Dynamic Standing-Level of Assistance: Close supervision, Contact guard  Dynamic Standing-Comments: x1  Functional Assessments:  Bed Mobility  Bed Mobility: Yes  Bed Mobility 1  Bed Mobility 1: Supine to sitting, Sitting to supine  Level of Assistance 1: Close supervision, Minimal verbal cues  Bed Mobility Comments 1: HOB elevated, bedrail    Transfers  Transfer: Yes  Transfer 1  Transfer From 1: Sit to, Stand to  Transfer to 1: Sit, Stand  Technique 1: Sit to stand, Stand to sit  Transfer Level of Assistance 1: Close supervision  Trials/Comments 1: x1; additional STS from chair    Ambulation/Gait Training  Ambulation/Gait Training Performed: Yes  Ambulation/Gait Training 1  Surface 1: Level tile  Device 1: No device  Assistance 1: Close supervision  Comments/Distance (ft) 1: 1x20 ft cumulative in room; no acute LOB, no use of DME  Extremity/Trunk Assessments:  RUE   RUE :  (AROM shoulder flexion to 90  degrees, remainder WFL AROM)  LUE   LUE:  (AROM shoulder flexion to 90 degrees, remainder WFL AROM)  RLE   RLE :  (AROM WFL)  LLE   LLE :  (AROM WFL)  Outcome Measures:  [facility] Basic Mobility  Turning from your back to your side while in a flat bed without using bedrails: None  Moving from lying on your back to sitting on the side of a flat bed without using bedrails: A little  Moving to and from bed to chair (including a wheelchair): A little  Standing up from a chair using your arms (e.g. wheelchair or bedside chair): A little  To walk in hospital room: A little  Climbing 3-5 steps with railing: A little  Basic Mobility - Total Score: 19    FSS-ICU  Ambulation: Walks <50 feet with any assistance x1 or walks any distance with assistance x2 people  Rolling: Supervision or set-up only  Sitting: Supervision or set-up only  Transfer Sit-to-Stand: Minimal assistance (performs 75% or more of task)  Transfer Supine-to-Sit: Minimal assistance (performs 75% or more of task)  Total Score: 19      Early Mobility/Exercise Safety Screen: Proceed with mobilization - No exclusion criteria met  ICU Mobility Scale: Walking with assistance of 1 person [8]  E = Exercise and Early Mobility  Early Mobility/Exercise Safety Screen: Proceed with mobilization - No exclusion criteria met  ICU Mobility Scale: Walking with assistance of 1 person    Encounter Problems       Encounter Problems (Active)       PT Problem       Patient will complete bed mobility with independence with HOB flat without using bedrailing         Start:  12/04/24    Expected End:  12/25/24            Patient will complete STS with independence using No Device without acute LOB         Start:  12/04/24    Expected End:  12/25/24            Patient will ambulate >/=200' withLRD with independence without acute LOB        Start:  12/04/24    Expected End:  12/25/24            Patient will ascend/descend 12 steps with x1 handrail and independence without acute LOB.           Start:  12/04/24    Expected End:  12/25/24            Patient will score >25/28 on the Tinetti Mobility Outcome Assessment (combined gait and balance) in order to demonstrate low fall risk.        Start:  12/04/24    Expected End:  12/25/24                   Education Documentation  Mobility Training, taught by Jacqueline Stallings PT at 12/4/2024 12:14 PM.  Learner: Patient  Readiness: Acceptance  Method: Explanation  Response: Needs Reinforcement  Comment: encouraged OOB with staff assistance    Education Comments  No comments found.        Jacqueline Stallings, HUE, DPT

## 2024-12-04 NOTE — ED PROVIDER NOTES
History of Present Illness     History provided by: Patient  Limitations to History: Respiratory Distress  External Records Reviewed with Brief Summary:  Previous ED visits, most recent discharge summary from 11/27/2024 for initial presentation with shortness of breath    HPI:  Stacey Heath is a 54 y.o. female past medical history of hypertension hyperlipidemia COPD on room air HFpEF with most recent EF of 50%, ESRD on Tuesday Thursday Saturday dialysis who presents today as a critical activation from triage for shortness of breath.  Patient was 82% with increased respiratory rate on room air with limited improvement on 3 L nasal cannula.  Patient is markedly tachypneic able to nod yes or no to questions, only able to answer yes or no occasionally.  The patient is able to endorse that she got all of her dialysis today, was acutely short of breath roughly 1 hour prior to arrival.  She did attempt to use breathing treatments, however, was unsuccessful. She does endorse some chest pain, but no abdominal pain. She denies any nausea vomiting or diarrhea.  She does endorse increased coughing. She denies any fever or headache, no numbness weakness or tingling arms or legs. She does state that she is on a blood thinner, but is unsure which one.    Physical Exam   Triage vitals:  T 37.1 °C (98.7 °F)  HR (!) 126  BP  (BP too high,machine wont read)  RR (!) 28  O2 (!) 91 % Supplemental oxygen    Physical Exam  Vitals and nursing note reviewed.   Constitutional:       General: She is in acute distress.      Appearance: Normal appearance. She is ill-appearing. She is not diaphoretic.   HENT:      Head: Normocephalic and atraumatic.      Mouth/Throat:      Mouth: Mucous membranes are moist.      Pharynx: No oropharyngeal exudate or posterior oropharyngeal erythema.   Eyes:      General: No scleral icterus.     Extraocular Movements: Extraocular movements intact.      Pupils: Pupils are equal, round, and reactive to light.    Cardiovascular:      Rate and Rhythm: Regular rhythm. Tachycardia present.      Pulses: Normal pulses.      Heart sounds: Normal heart sounds. No murmur heard.     No gallop.      Comments: Palpable DP and PT pulses, palpable radial pulses.  Thrill palpable over right upper extremity fistula  Pulmonary:      Effort: Tachypnea, accessory muscle usage and respiratory distress present.      Breath sounds: No stridor. Examination of the right-upper field reveals rales. Examination of the left-upper field reveals rales. Examination of the right-middle field reveals rales. Examination of the left-middle field reveals rales. Examination of the right-lower field reveals rales. Examination of the left-lower field reveals rales. Rales present. No wheezing or rhonchi.   Chest:      Chest wall: No tenderness or edema. There is no dullness to percussion.   Abdominal:      General: Bowel sounds are normal. There is no distension.      Palpations: Abdomen is soft. There is no mass.      Tenderness: There is no abdominal tenderness. There is no guarding or rebound.      Hernia: No hernia is present.   Musculoskeletal:         General: No swelling, deformity or signs of injury. Normal range of motion.      Cervical back: Normal range of motion and neck supple. No tenderness.      Right lower leg: No tenderness. No edema.      Left lower leg: No tenderness. No edema.   Skin:     General: Skin is warm.      Capillary Refill: Capillary refill takes less than 2 seconds.      Findings: No erythema, lesion or rash.   Neurological:      General: No focal deficit present.      Mental Status: She is alert. Mental status is at baseline.   Psychiatric:         Mood and Affect: Mood is anxious.         Behavior: Behavior normal.          Medical Decision Making & ED Course   Medical Decision Makin y.o. female past medical history of hypertension hyperlipidemia COPD on room air HFpEF most recent EF of 50% as well as ESRD on Tuesday  Thursday Saturday dialysis.  Given patient's presentation with significant hypertension as well as the fact the patient was compliant with meds and did get dialysis today, my concern would be that the patient is experiencing flash pulmonary edema. Given this, we did initiate the patient on BiPAP 18/8 as well as a nitro drip initially at 200 mcg/min.  We did also provide her with 1.2 of sublingual nitro while setting up the drip.  Patient did have significant improvement in her respiratory rate as well as her oxygen saturation.  Patient remained quite hypertensive to the 190s over 100s despite nitro drip, so was uptitrated to 250.  Patient's initial labs, no elevation white count.  Patient does have a elevated creatinine, however, is improved from baseline.  Patient is on torsemide 40 once a day as she does make a small amount of urine, so we will provide her with 80 of Lasix to see if we can help with symptomatic management.  Patient did have an initially mildly elevated troponin of 39 as well as a BNP of 596.  Patient be signed out to the oncoming team pending attempt to wean BiPAP and final disposition.  ----      Differential diagnoses considered include but are not limited to: Flash pulmonary edema, CHF exacerbation, COPD exacerbation, IHD nonadherence  Social Determinants of Health which Significantly Impact Care: Difficulty obtaining outpatient follow-up     EKG Independent Interpretation:  EKG demonstrates sinus tachycardia with no significant ST elevation T wave inversion or ST depression.  Somewhat poor baseline, however, no overt ST changes.  No evidence of ST peaking suggestive of hyperkalemia.    Independent Result Review and Interpretation: Relevant laboratory and radiographic results were reviewed and independently interpreted by myself.  As necessary, they are commented on in the ED Course.    Chronic conditions affecting the patient's care: As documented above in MDM    The patient was discussed with  the following consultants/services: None    Care Considerations: As documented above in MDM    ED Course:  Diagnoses as of 12/04/24 1411   Shortness of breath   Congestive heart failure, unspecified HF chronicity, unspecified heart failure type   Hypertensive emergency     Disposition   Signed out to the oncoming team pending final disposition and recommendation.    Procedures   Procedures    Patient seen and discussed with ED attending physician.    Hudson Jenkins MD  Emergency Medicine       Hudson Jenkins MD  Resident  12/04/24 1418       Armando Meehan MD  12/05/24 0662

## 2024-12-04 NOTE — H&P
Medical Intensive Care - History and Physical   Subjective    Stacey Heath is a 54 y.o. year old female patient admitted on 12/3/2024 with following ICU needs: Cardene drip    HPI:  She has a PMH significant for ESRD on dialysis (T/Th/Sat via RUE AVF), HTN, HFpEF, COPD (no o2), brachial DVT on Eliquis presented with a chief complaint of SOB 2/2 hypertensive emergency w/ flash PE.     In the ED: afebrile: , /97, RR 20, BIPAP 18/8 11L. She was placed on a nitro drip, given 80mg Lasix, and placed on BiPAP. CO2 down trended (57 - 49), she was taken off BiPAP at 245AM and started on Cardene and nitroglycerin. Transferred to MICU 2/2 inability to transition to long acting oral meds    As far as the trigger, she says she had dialysis on Tuesday and did not miss any of her BP meds. Unclear what the trigger was. She's had numerous admissions for the same thing. Admitted on Nov 9 to the CICU and was discharged from hospital Nov 20.    In the MICU this AM patient was very lethargic and only speaking 1-2 words before going back to sleep.    Imaging:  - CXR: suggestive of pulmonary edema, lt > r pleurel effusions, and atelectasis      Meds    Home medications:  Current Outpatient Medications   Medication Instructions    acetaminophen (TYLENOL) 1,000 mg, oral, Every 8 hours PRN    albuterol (ProAir HFA) 90 mcg/actuation inhaler 2 puffs, inhalation, Every 4 hours PRN    albuterol 1.25 mg, nebulization, Every 6 hours PRN    aspirin 81 mg, oral, Daily    atorvastatin (LIPITOR) 80 mg, oral, Nightly    calcium acetate (PHOSLO) 1,334 mg, oral, 3 times daily (morning, midday, late afternoon)    carvedilol (COREG) 50 mg, oral, 2 times daily    cloNIDine (Catapres-TTS) 0.2 mg/24 hr patch Apply one patch on the skin and replace every 7 days, as directed. Do not start before October 31, 2024.    cyclobenzaprine (FLEXERIL) 10 mg, oral, 3 times daily PRN    docusate sodium (COLACE) 100 mg, oral, Every 12 hours    Eliquis 5 mg,  oral, 2 times daily    epoetin joceline (EPOGEN,PROCRIT) 10,000 Units, subcutaneous, 3 times weekly    fluticasone (Flonase) 50 mcg/actuation nasal spray 2 sprays, Each Nostril, Daily, Shake gently. Before first use, prime pump. After use, clean tip and replace cap.    fluticasone furoate-vilanteroL (Breo Ellipta) 100-25 mcg/dose inhaler 1 puff, inhalation, Daily RT    gabapentin (NEURONTIN) 300 mg, oral, Nightly    hydrALAZINE (APRESOLINE) 100 mg, oral, 3 times daily    hydrOXYzine HCL (ATARAX) 25 mg, oral, Every 6 hours PRN    isosorbide mononitrate ER (IMDUR) 120 mg, oral, Daily, Do not crush or chew.    NIFEdipine ER (ADALAT CC) 90 mg, oral, Daily before breakfast, Do not crush, chew, or split.    pantoprazole (PROTONIX) 40 mg, oral, As needed, Do not crush, chew, or split.    SUMAtriptan (IMITREX) 25 mg, oral, Once as needed, May repeat dose once in 2 hours if no relief.  Do not exceed 2 doses in 24 hours.    torsemide (Demadex) 20 mg tablet Take 2 tablets once daily on non-dialysis days only. Do not take on dialysis days    valsartan (DIOVAN) 320 mg, oral, Every evening        Inpatient medications:  Scheduled medications  aspirin, 81 mg, oral, Daily  atorvastatin, 80 mg, oral, Nightly  calcium acetate, 1,334 mg, oral, TID  carvedilol, 50 mg, oral, BID  cloNIDine, 1 patch, transdermal, Daily  epoetin joceline-epbx, 10,000 Units, subcutaneous, Once per day on Monday Wednesday Friday  fluticasone, 2 spray, Each Nostril, Daily  fluticasone furoate-vilanteroL, 1 puff, inhalation, Daily  gabapentin, 300 mg, oral, Nightly  heparin, 60 Units/kg, intravenous, Once  hydrALAZINE, 100 mg, oral, TID  isosorbide mononitrate ER, 120 mg, oral, Daily  NIFEdipine ER, 90 mg, oral, Daily before breakfast  oxygen, , inhalation, Continuous - Inhalation  pantoprazole, 40 mg, oral, Daily before breakfast  polyethylene glycol, 17 g, oral, Daily  valsartan, 320 mg, oral, q PM  vitamin B complex-vitamin C-folic acid, 1 capsule, oral,  "Daily      Continuous medications  heparin, 0-4,000 Units/hr  niCARdipine, 2.5-15 mg/hr, Last Rate: 5 mg/hr (12/04/24 0550)  nitroglycerin, 5-400 mcg/min, Last Rate: 100 mcg/min (12/04/24 0555)      PRN medications  PRN medications: albuterol, albuterol, cyclobenzaprine, hydrOXYzine HCL, SUMAtriptan     Objective    Blood pressure 140/66, pulse 87, temperature 36.5 °C (97.7 °F), temperature source Temporal, resp. rate 12, height 1.397 m (4' 7\"), weight 60.8 kg (134 lb 0.6 oz), SpO2 93%.     PHYSICAL EXAM  GEN: NAD.   Eyes: PERRL.   Mouth: MMM.  CVS: RRR, blowing murmur, rubs, or gallops. No JVD. Negative HJR.  Resp: CTA b/l.  Abd: Flat. ND. NT. NL BS. Soft.  MSK: No edema. Fistula in right arm.  Skin: WWP. NL capillary refill.  Neuro: NFD. A&Ox4. 5/5 Power t/o. CN intact.     No intake or output data in the 24 hours ending 12/04/24 0709  Labs:   Results from last 72 hours   Lab Units 12/03/24  2126   SODIUM mmol/L 138   POTASSIUM mmol/L 5.7*   CHLORIDE mmol/L 98   CO2 mmol/L 27   BUN mg/dL 28*   CREATININE mg/dL 5.70*   GLUCOSE mg/dL 109*   CALCIUM mg/dL 8.8   ANION GAP mmol/L 19   EGFR mL/min/1.73m*2 8*      Results from last 72 hours   Lab Units 12/03/24  2126   WBC AUTO x10*3/uL 5.0   HEMOGLOBIN g/dL 11.6*   HEMATOCRIT % 37.2   PLATELETS AUTO x10*3/uL 185   NEUTROS PCT AUTO % 59.9   LYMPHS PCT AUTO % 22.2   MONOS PCT AUTO % 6.0   EOS PCT AUTO % 10.3        Micro/ID:     Lab Results   Component Value Date    URINECULTURE NO SIGNIFICANT GROWTH. 07/09/2023    BLOODCULT  08/21/2023     No Growth at 1 days~No Growth at 2 days~No Growth at 3 days~NO GROWTH at 4 days - FINAL REPORT       Assessment and Plan     Assessment:  he has a PMH significant for ESRD on dialysis (T/Th/Sat via RUE AVF), HTN, HFpEF, COPD (no o2), brachial DVT on Eliquis presented with a chief complaint of SOB 2/2 hypertensive emergency w/ flash PE.       NEUROLOGIC    #Peripheral neuropathy   - c/w home gabapentin      CARDIOVASCULAR  #HTN " emergency with flash pulmonary edema   #HFpEF  :: home meds: Coreg 50 mg BID, Clonidine 0.2 mg/24 hr patch- 1 patch on skin every friday days, hydralazine 100 mg TID , Imdur 120 mg every day , Nifedipine 90 mg every day , Valsartan 320 mg every day   :: TTE 04/2024- LVEF 60-65% with pseudonormal pattern of left ventricular diastolic filling.   :: Renal US 2023 --> Doppler evaluation compatible with history of chronic renal failure on dialysis.   :: Seen by sleep medicine 11/2024 who felt she likely had sleep apnea based on h/p- planning for outpatient sleep study   Plan:  - Continue with home meds  - Cardene drip and nitroglycerin infusion until she gets dialysis depending on her pressures        PULMONARY  #AHRF  :: s/p IV lasix 80 mg in the ED, currently on room air  :: Resumed med Torsemide 40 mg every day on non HD days  :: Impression: likely 2/2 flash pulm edema, likely 2/2 fluid redistribution as opposed to fluid overload.   Plan:  - holding home torsemide pending dialysis today  - Control BP as above  - Nephro consulted for HD today     #COPD without acute exacerbation   - continue home Breo Ellipta 100-25 1 puff daily, albuterol q4,     RENAL/  #ESRD on iHD T/TH/SAT  :: home meds torsemide 40mg on non dialysis days, nephrocaps  Plan:  - consult nephro  - Nephro c/s in AM for iHD      GASTROINTESTINAL  #GERD  - c/w home PPI      ENDOCRINE  No active issues      HEME/ONC  #Normocytic anemia, likely 2/2 renal disease  - c/w  Epoetin joceline 63618 units three times a week      #Hx of LUE DVT  :: per chart review appears that brachial vein DVT in setting of PICC --> provoked   Plan:  - c/w eliquis for now, suspect can be discontinued as > 6 month duration of therapy has now been completed. Will defer to floor team for final decision      INFECTIOUS DISEASE  No active issues      MSK/DERM  No active issues     ICU Check List     FEN  Fluids: PRN  Electrolytes: PRN  Nutrition: regular diet   Prophylaxis:  DVT ppx:  apixaban   GI ppx: PPI  Bowel care: miralax  Hardware:  Catheter: no  Access: RUE fistula   Drains: no  Lines: PIV  Social:  Code: Full Code    HPOA: Daughter Diya Jang, Alirio Phone: [phone]   Disposition: MICU -> Step down    Amie Blue MD   12/04/24 at 7:09 AM     Disclaimer: Documentation completed with the information available at the time of input. The times in the chart may not be reflective of actual patient care times, interventions, or procedures. Documentation occurs after the physical care of the patient.

## 2024-12-04 NOTE — HOSPITAL COURSE
Stacey Heath is a 54 y.o. 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  Oxygen saturation was found to be 82% on room air and markedly tachypneic and tachycardic on exam and was unable to speak in sentences. But saturation improved on 3L.   The patient is able to endorse that she got all of her dialysis during day was acutely short of breath roughly 1 hour prior to arrival to ED.  She did attempt to use breathing treatments, however, was unsuccessful.  She does endorse some chest pain, but no abdominal pain.  She denies any nausea vomiting or diarrhea.  She does endorse increased coughing. VS in ED with BP too high for machine to read;  CXR was consistent with pulmonary edema; SOB 2/2 hypertensive emergency w/ flash PE.   She was placed on BIPAP 18/8 11L and given 80mg lasix and was placed on a nitro drip.  Initial VBG with CO2 of 57; down trended (57 -> 49), she was taken off BiPAP  and started on Cardene and nitroglycerin. Transferred to MICU for further treatment.  Pt had dialysis on Tuesday and did not miss any of her BP meds. Unclear what the trigger was. She's had numerous admissions for the same thing. (on 11/9 to the CICU and was discharged from hospital 11/20).    In  the MICU cardene drip was weaned off and she was continued on home medications.  Nephrology was consulted with plan for HD on 12/6.  On 12/5 she was transferred to the SDU for further close monitoring of HTN with goal to keep MAP > 65 and SBP > 90 in setting of lowering BP.   On floor patient BP was controlled with Carvedilol 25 mg twice daily, Valsartan 320 mg daily, Nifedipine 90 mg daily, Clonidine 0.1 mg at bedtime and PRN Clonidine 0.1 mg every 6 hrs for SBP >180. Patient requiring 1x prn clonidine daily. Discharged on Carvedilol 25 mg twice daily; Valsartan 320 mg daily;  Nifedipine 90 mg daily; Clonidine 0.2 at bedtime per Dr. Barraza.

## 2024-12-04 NOTE — PROGRESS NOTES
SOCIAL WORK NOTE        12/04/24 1542   Discharge Planning   Living Arrangements Spouse/significant other;Family members   Support Systems Spouse/significant other;Children;Family members   Assistance Needed independent   Type of Residence Private residence   Home or Post Acute Services None   Expected Discharge Disposition Home   Financial Resource Strain   How hard is it for you to pay for the very basics like food, housing, medical care, and heating? Not hard   Housing Stability   In the last 12 months, was there a time when you were not able to pay the mortgage or rent on time? N   In the past 12 months, how many times have you moved where you were living? 0   At any time in the past 12 months, were you homeless or living in a shelter (including now)? N   Transportation Needs   In the past 12 months, has lack of transportation kept you from medical appointments or from getting medications? no   In the past 12 months, has lack of transportation kept you from meetings, work, or from getting things needed for daily living? No     NOK: Children   DME: denied  Home Care: [facility]-agreeable to return   HD: [facility]   PCP: AUTUMN Worthy, name unknown   Additional information:  SW met with patient at bedside for assessment. Patient normally lives at home with SO and nephew (adult and minor, in process of obtaining custody). She is independent at baseline, nephew drives her to HD. Patient reported issues with food stamps, referral placed to CHW for food resources. Social work to follow.  Abbi Lindquist, M[place], LISW-S ([id number])

## 2024-12-04 NOTE — CARE PLAN
CHW Note  CHW consulted to follow up with patient to discuss food stamps and pantries.  CHW met with pt at bedside, introduced self and role.  At bedside was JERRELL, CHW, and nurse.  JERRELL states she is assisting pt apply for SNAP through the food bank.  This CHW provided the pt with a list of food pantries.  Made myself available to pt and bedside team should any further CHW need arise.  Discussed the following topics on behalf of the patient:  []  Behavioral Health Assistance     []  Case Management  []   Assistance  []  Digital Equity Assistance  []  Dental Health Assistance  []  Education Assistance  []  Employment Assistance  []  Financial Strain Relief Assistance  [x]  Food Insecurity Assistance  []  Healthcare Coverage Assistance  []  Housing Stability Assistance  []  IP Violence Relief Assistance  []  Legal Assistance  []  Physical Activity Assistance  []  Social Connection Assistance  []  Stress Relief Assistance   []  Substance Abuse Assistance  []  Transportation Assistance  []  Utility Assistance  []  Other: [insert comment here]    Brianna Flowers, [facility] CenterDEEJAY

## 2024-12-04 NOTE — SIGNIFICANT EVENT
Floor Readiness Note       I, personally, evaluated Stacey Heath prior to transfer to the floor, including reviewing all current laboratory and imaging studies. The patient remains appropriate for transfer to the floor. Bedside nurse and respiratory therapy are also in agreement of patient's readiness for the floor.     Brief summary:  Stacey Heath is a 54 y.o. year old female patient admitted on 12/3/2024 with following ICU needs: Cardene drip. She has a PMH significant for ESRD on dialysis (T/Th/Sat via RUE AVF), HTN, HFpEF, COPD (no o2), brachial DVT on Eliquis presented with a chief complaint of SOB 2/2 hypertensive emergency w/ flash PE.     Upon arrival to the MICU she was on RA, weaned off the drips and was resumed on her home meds. Nephrology was consulted and she will be getting HD tmrw.    Updated focused Physical Exam:  PHYSICAL EXAM  GEN: NAD.   Eyes: PERRL.   Mouth: MMM.  CVS: RRR, blowing murmur, rubs, or gallops. No JVD. Negative HJR.  Resp: CTA b/l.  Abd: Flat. ND. NT. NL BS. Soft.  MSK: No edema. Fistula in right arm.  Skin: WWP. NL capillary refill.  Neuro: NFD. A&Ox4. 5/5 Power t/o. CN intact.     Current Vital Signs:  Heart Rate: 90 (12/04/24 1200 : User, System Default)  BP: (!) 195/75 (12/04/24 1200 : User, System Default)  Temp: 36.4 °C (97.5 °F) (12/04/24 1200 : [place])  Resp: 15 (12/04/24 1200 : User, System Default)  SpO2: 94 % (12/04/24 1200 : User, System Default)    Relevant updates since rounds:  - removed off the cardene and nitro drip  - resumed home BP meds  -  will get dialysis tmrw     Accepting team, Step down team, received verbal sign out and the Provider Care team/Attending has been updated. Bedside nurse will now call accepting nurse for report and patient will be transferred to [address] floor, bed 15.    Amie Blue MD

## 2024-12-04 NOTE — SIGNIFICANT EVENT
ICU to [facility] Transfer Summary     I:  ICU Admission Reason & Brief ICU Course:    Stacey Heath is a 54 y.o. year old female patient admitted on 12/3/2024 with following ICU needs: Cardene drip.     She has a PMH significant for ESRD on dialysis (T/Th/Sat via RUE AVF), HTN, HFpEF, COPD (no o2), brachial DVT on Eliquis presented with a chief complaint of SOB 2/2 hypertensive emergency w/ flash PE.      In the ED: afebrile: , /97, RR 20, BIPAP 18/8 11L. She was placed on a nitro drip, given 80mg Lasix, and placed on BiPAP. CO2 down trended (57 - 49), she was taken off BiPAP at 245AM and started on Cardene and nitroglycerin. Transferred to MICU 2/2 inability to transition to long acting oral meds.    Upon arrival to the MICU she was on RA, weaned off the drips and was resumed on her home meds. Nephrology was consulted and she will be getting HD tmrw.       C: Code Status/DPOA Info/Goals of Care/ACP Note    Full Code  DPOA/Contact Number: DaughterDiya, [phone]    U: Unprescribing & Pertinent High-Risk Medications    Changes to home meds: none     Anticoagulation: apaxiban     Antibiotics:   [x] N/A - no current planned antimicrobioals    P: Pending Tests at the Time of Transfer   none      A: Active consultants, including Rehab:   [x]  Subspecialty Consultants: nephrology  []  PT  []  OT  []  SLP  []  Wound Care    U: Uncertainty Measure/Diagnostic Pause:    Working diagnosis at the time of transfer Hypertensive Emergency.     Diagnosis Degree of Certainty: 1. High degree of certainty about the clinical diagnosis.     S: Summary of Major Problems and To-Dos:   NEUROLOGIC     #Peripheral neuropathy   - c/w home gabapentin      CARDIOVASCULAR  #HTN emergency with flash pulmonary edema   #HFpEF  :: home meds: Coreg 50 mg BID, Clonidine 0.2 mg/24 hr patch- 1 patch on skin every friday days, hydralazine 100 mg TID , Imdur 120 mg every day , Nifedipine 90 mg every day , Valsartan 320 mg every day   ::  TTE 04/2024- LVEF 60-65% with pseudonormal pattern of left ventricular diastolic filling.   :: Renal US 2023 --> Doppler evaluation compatible with history of chronic renal failure on dialysis.   :: Seen by sleep medicine 11/2024 who felt she likely had sleep apnea based on h/p- planning for outpatient sleep study   Plan:  - Continue with home meds        PULMONARY  #AHRF  :: s/p IV lasix 80 mg in the ED, currently on room air  :: Resumed med Torsemide 40 mg every day on non HD days  :: Impression: likely 2/2 flash pulm edema, likely 2/2 fluid redistribution as opposed to fluid overload.   Plan:  - resume home torsemide  - Control BP as above  - Nephro consulted      #COPD without acute exacerbation   - continue home Breo Ellipta 100-25 1 puff daily, albuterol q4,     RENAL/  #ESRD on iHD T/TH/SAT  :: home meds torsemide 40mg on non dialysis days, nephrocaps  Plan:  - consult nephro  - Nephro c/s in AM for iHD      GASTROINTESTINAL  #GERD  - c/w home PPI      ENDOCRINE  No active issues      HEME/ONC  #Normocytic anemia, likely 2/2 renal disease  - c/w  Epoetin joceline 00454 units three times a week      #Hx of LUE DVT  :: per chart review appears that brachial vein DVT in setting of PICC --> provoked   Plan:  - c/w eliquis for now, suspect can be discontinued as > 6 month duration of therapy has now been completed. Will defer to floor team for final decision      INFECTIOUS DISEASE  No active issues      MSK/DERM  No active issues        E: Exam, including Lines/Drains/Airways & Data Review:     Temp:  [36.3 °C (97.3 °F)-37.1 °C (98.7 °F)] 36.4 °C (97.5 °F)  Heart Rate:  [] 90  Resp:  [10-28] 15  BP: (127-223)/() 195/75  FiO2 (%):  [21 %-64 %] 21 %               PHYSICAL EXAM  GEN: NAD.   Eyes: PERRL.   Mouth: MMM.  CVS: RRR, blowing murmur, rubs, or gallops. No JVD. Negative HJR.  Resp: CTA b/l.  Abd: Flat. ND. NT. NL BS. Soft.  MSK: No edema. Fistula in right arm.  Skin: WWP. NL capillary  refill.  Neuro: NFD. A&Ox4. 5/5 Power t/o. CN intact.         Within 30 minutes of the patient physically leaving the floor, a Floor Readiness Note needs to be placed with updated vitals.

## 2024-12-04 NOTE — PROGRESS NOTES
I assumed care of this patient at 11 PM.    Briefly this is a 54-year-old female with history of ESRD (TTS), COPD not on oxygen, HFpEF, hypertension, brachial DVT on Eliquis presenting due to concerns of shortness of breath, hypoxia.  Patient was noted to be in hypertensive emergency versus SCA PE.  Patient was placed on a nitro drip, given Lasix, and placed on BiPAP.  Patient had improvement of symptoms and repeat VBG showed improvement in CO2 that downtrended from 57-49.  Patient was taken off of BiPAP around 2:45 AM by myself given significant improvement in terms of work of breathing.  Patient was started on Cardene in addition to nitro given resistant blood pressures. Patient is initially attempted to get admitted to the CICU however they are full.  CICU fellow did recommend trying to transition the patient to Cardene as a may improve her blood pressures better.  Her baseline blood pressures run between 160-170 systolic at baseline. MICU was available and accepted the patient for further workup and management. Given inability to transition her to long-acting oral medications, patient was sent to the MICU for further workup and management of patient's hypertensive emergency.  Patient care was overseen by attending physician agrees with the plan and disposition.    Tania Sánchez MD  Emergency Medicine - PGY3  [facility]  [address] [facility] [address]

## 2024-12-04 NOTE — CONSULTS
"Stacey Heath   54 y.o.    @WT@  MRN/Room: [id number]/30/30-A  DOA: 12/3/2024    REASON FOR CONSULT: ESKD Mx    REQUESTING PHYSICIAN: Imelda MÁRQUEZ MD  PRIMARY CARE PHYSICIAN: Sherri Gastelum MD    ADMISSION DIAGNOSIS:   1. Shortness of breath    2. Congestive heart failure, unspecified HF chronicity, unspecified heart failure type    3. Hypertensive emergency          P/C:      HPI:  Stacey Heath is a 54 y.o. 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 She did not miss any treatment. Nephrology is consulted for ESKD Mx.     ROS: systems reviewed and negative except mentioned in HPI    In the ER: BP (!) 204/84   Pulse 97   Temp 36.4 °C (97.5 °F) (Temporal)   Resp 19   Ht 1.397 m (4' 7\")   Wt 60.8 kg (134 lb 0.6 oz)   SpO2 95%   BMI 31.15 kg/m²      Past Medical History  She has a past medical history of Bacteremia due to methicillin resistant Staphylococcus aureus (07/08/2024), Cardiac/pericardial tamponade (01/20/2024), CHF (congestive heart failure), COPD (chronic obstructive pulmonary disease) (Multi), Coronary artery disease, Disorder of sweat glands (02/25/2023), Dry eye syndrome of bilateral lacrimal glands (03/07/2017), ESRD (end stage renal disease) (Multi), Essential (primary) hypertension (12/27/2022), History of acute pancreatitis (12/21/2020), Low grade squamous intraepithelial lesion (LGSIL) on cervicovaginal cytologic smear (02/25/2023), Migraines, 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 (02/25/2023), and Type 2 myocardial infarction (Multi) (01/20/2024).    Surgical History  She has a past surgical history that includes Tubal ligation (03/07/2017); Other surgical history (03/07/2017); Appendectomy (03/07/2017); Other surgical history (03/07/2017); Other surgical history (12/08/2021); Other surgical history (12/08/2021); and CT angio " abdomen w and or wo IV IV contrast (04/23/2023).     Social History  She reports that she has quit smoking. Her smoking use included cigarettes. She has been exposed to tobacco smoke. She has never used smokeless tobacco. She reports that she does not currently use alcohol. She reports that she does not currently use drugs after having used the following drugs: Cocaine and Marijuana.    Family History  Family History   Problem Relation Name Age of Onset    Other (Cerebrovascular Accident) Father      Heart failure Paternal Grandmother      Breast cancer Paternal Grandmother      Other (Primary Cervical Cancer) Paternal Grandmother      Diabetes Paternal Grandfather      Breast cancer Father's Sister      Ovarian cancer Father's Sister         Meds:   apixaban, 5 mg, BID  aspirin, 81 mg, Daily  atorvastatin, 80 mg, Nightly  calcium acetate, 1,334 mg, TID  carvedilol, 50 mg, BID  [Held by provider] cloNIDine, 1 patch, Daily  doxazosin, 2 mg, Daily  epoetin joceline-epbx, 10,000 Units, Once per day on Monday Wednesday Friday  fluticasone, 2 spray, Daily  fluticasone furoate-vilanteroL, 1 puff, Daily  gabapentin, 300 mg, Nightly  hydrALAZINE, 100 mg, TID  isosorbide mononitrate ER, 120 mg, Daily  NIFEdipine ER, 90 mg, Daily before breakfast  oxygen, , Continuous - Inhalation  pantoprazole, 40 mg, Daily before breakfast  polyethylene glycol, 17 g, Daily  valsartan, 320 mg, q PM  vitamin B complex-vitamin C-folic acid, 1 capsule, Daily         albuterol, 1.25 mg, q6h PRN  albuterol, 2 puff, q4h PRN  cyclobenzaprine, 10 mg, TID PRN  hydrOXYzine HCL, 25 mg, q6h PRN  SUMAtriptan, 25 mg, Once PRN      Current Outpatient Medications   Medication Instructions    acetaminophen (TYLENOL) 1,000 mg, oral, Every 8 hours PRN    albuterol (ProAir HFA) 90 mcg/actuation inhaler 2 puffs, inhalation, Every 4 hours PRN    albuterol 1.25 mg, nebulization, Every 6 hours PRN    aspirin 81 mg, oral, Daily    atorvastatin (LIPITOR) 80 mg, oral,  Nightly    calcium acetate (PHOSLO) 1,334 mg, oral, 3 times daily (morning, midday, late afternoon)    carvedilol (COREG) 50 mg, oral, 2 times daily    cloNIDine (Catapres-TTS) 0.2 mg/24 hr patch Apply one patch on the skin and replace every 7 days, as directed. Do not start before October 31, 2024.    cyclobenzaprine (FLEXERIL) 10 mg, oral, 3 times daily PRN    docusate sodium (COLACE) 100 mg, oral, Every 12 hours    Eliquis 5 mg, oral, 2 times daily    epoetin joceline (EPOGEN,PROCRIT) 10,000 Units, subcutaneous, 3 times weekly    fluticasone (Flonase) 50 mcg/actuation nasal spray 2 sprays, Each Nostril, Daily, Shake gently. Before first use, prime pump. After use, clean tip and replace cap.    fluticasone furoate-vilanteroL (Breo Ellipta) 100-25 mcg/dose inhaler 1 puff, inhalation, Daily RT    gabapentin (NEURONTIN) 300 mg, oral, Nightly    hydrALAZINE (APRESOLINE) 100 mg, oral, 3 times daily    hydrOXYzine HCL (ATARAX) 25 mg, oral, Every 6 hours PRN    isosorbide mononitrate ER (IMDUR) 120 mg, oral, Daily, Do not crush or chew.    NIFEdipine ER (ADALAT CC) 90 mg, oral, Daily before breakfast, Do not crush, chew, or split.    pantoprazole (PROTONIX) 40 mg, oral, As needed, Do not crush, chew, or split.    polyethylene glycol (Glycolax, Miralax) 17 gram/dose powder Take 17 g (1 scoop dissolved in liquid) by mouth once daily. Do not start before July 26, 2024.    SUMAtriptan (IMITREX) 25 mg, oral, Once as needed, May repeat dose once in 2 hours if no relief.  Do not exceed 2 doses in 24 hours.    torsemide (Demadex) 20 mg tablet Take 2 tablets once daily on non-dialysis days only. Do not take on dialysis days    valsartan (DIOVAN) 320 mg, oral, Every evening    vitamin B complex-vitamin C-folic acid (Nephrocaps) 1 mg capsule 1 capsule, oral, Daily          VITALS:  Temp:  [36.3 °C (97.3 °F)-37.1 °C (98.7 °F)] 36.4 °C (97.5 °F)  Heart Rate:  [] 97  Resp:  [10-28] 19  BP: (127-223)/() 204/84  FiO2 (%):  [21  "%-64 %] 21 %     Intake/Output Summary (Last 24 hours) at 12/4/2024 1355  Last data filed at 12/4/2024 1303  Gross per 24 hour   Intake 1282.35 ml   Output --   Net 1282.35 ml      No intake/output data recorded.  No intake/output data recorded.   [unfilled]     PHYSICAL EXAMINATION:  General appearance: AAOx3. No distress  Eyes: non-icteric  Skin: no apparent rash  Heart: S1 S2 regular  Lungs: CTA bilaterally with minimal crackles  Abdomen: soft, nt/nd  Extremities: No edema bilaterally  Neuro: No FND  ACCESS: RUE AVF       INVESTIGATIONS:  Results from last 7 days   Lab Units 12/03/24  2126   WBC AUTO x10*3/uL 5.0   RBC AUTO x10*6/uL 3.88*   HEMOGLOBIN g/dL 11.6*   HEMATOCRIT % 37.2     Results from last 7 days   Lab Units 12/03/24  2126   SODIUM mmol/L 138   POTASSIUM mmol/L 5.7*   CHLORIDE mmol/L 98   CO2 mmol/L 27   BUN mg/dL 28*   CREATININE mg/dL 5.70*   CALCIUM mg/dL 8.8   BILIRUBIN TOTAL mg/dL 0.6   ALT U/L 25   AST U/L 76*         No results found for: \"ALBUR\", \"[id number]\"   No results found for the last 90 days.      IMAGING:  XR chest 1 view    Result Date: 12/3/2024  Interpreted By:  Sammy Noyola and Beyersdorf Conner STUDY: XR CHEST 1 VIEW;  12/3/2024 9:39 pm   INDICATION: Signs/Symptoms:New significant hypoxia.   COMPARISON: Two-view chest 11/26/2024   ACCESSION NUMBER(S): [id number]   ORDERING CLINICIAN: ALVARO DONAHUE   FINDINGS: AP radiograph of the chest was provided.   Similar position of the right subclavian vascular stent.   CARDIOMEDIASTINAL SILHOUETTE: Cardiomediastinal silhouette is stable in size and configuration.   LUNGS: Interval improved aeration of the lower lungs. Prominence of the interstitial markings, improved compared to prior likely reflecting pulmonary edema. Similar nodular density in the left midlung. Left basilar atelectasis. Small left-greater-than-right pleural effusions and adjacent atelectasis. No pneumothorax.   ABDOMEN: No remarkable upper abdominal findings.   " BONES: No acute osseous changes.       1. Prominence of the interstitial markings likely representing pulmonary edema, slightly improved compared to 11/26/2024. 2. Small left-greater-than-right pleural effusions and adjacent atelectasis.   I personally reviewed the image(s)/study and resident interpretation. I agree with the findings as stated by resident Greg Cheng. Data analyzed and images interpreted at [facility], [place], [place].   MACRO: None   Signed by: Sammy Noyola 12/3/2024 11:33 PM Dictation workstation:   [id number]       ASSESSMENT:  Stacey Heath is a 54 y.o. 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 She did not miss any treatment. Nephrology is consulted for ESKD Mx.       #ESKD on HD TTS  -HD unit: [facility]   -Primary nephrologist: Dr Barraza  -Schedule TTS  -Access: RUE AVF     #Electrolytes  - K 5.7 hemolyzed    #Acid-Base  - HCO3 27    #Anemia  - Hb 11-12    #MBD  - wnl    #Hemodynamics  - BP in 190-200 SBP at presentation , was on 11 L at presentaion however now at RA when seen in rounds  - CXR : interstitial markings likely representing pulmonary edema, slightly improved and B/L P.eff      RECOMMENDATIONS:  - iHD tomorrow per schedule  - Renal diet  - Renal MVI  - Keep MAP >65 or SBP >90  - Strict I/O monitoring, daily weights, daily BMP  - Will continue to follow    Patient is discussed with the attending.    Jo Wade MD  Nephrology Fellow   Daytime / Weekend Renal Pager [phone]  After 7 pm Emergencies [phone] Pager [phone]

## 2024-12-04 NOTE — PROGRESS NOTES
Stacey Heath  Age: 54 y.o.  MRN: [id number]  Date: 12/4/2024  Location of service: in community    Program Details  Medicaid Community Clinical Case Management  Status: Enrolled  Effective Dates: 10/17/2023 - present  Responsible Staff: NAREN Davidson      Goals Reviewed:  Problem: Access to Care Issue       Goal: Assess and Address Access Barriers       Priority: Medium        Problem: Anxiety       Goal: Maintain coping skills for continuous improvement       Priority: Medium        Problem: Financial Stressors       Goal: Assistance with financial concerns         Problem: Kidney Issues       Goal: Improve health to get on kidney transplant list       Priority: High        Problem: Medication Adherence       Goal: Adherence to Medication Regimen       Priority: High        Problem: Negative Experience, Conflict with, or Distrust of Providers and/or Health System       Goal: Plan to Address Patient Specific Negative Experience, Distrust, or Conflict with Providers and/or Health System       Priority: High        Problem: Risk of Uncoordinated Care       Goal: Care will be Coordinated and Supported by a Multidisciplinary Team of Providers       Priority: High          Summary:  This provider met with patient at [facility] where patient was admitted. This provider listened with empathy as patient explained why she was admitted this time. Provider assisted patient with connecting to the RD for their scheduled appointment today via zoom. This provider also introduced Deann our Community Health Worker and explained how Deann being added to the patient's case will benefit the patient. This provider also discussed with the patient a plan that the team has in order to help patient reach her health goals. Provider listened to the patient's concerns and answered any questions patient had about the plan.                      NAREN Davidson

## 2024-12-04 NOTE — PROGRESS NOTES
Abdomen , soft, nontender, nondistended , no guarding or rigidity , no masses palpable , normal bowel sounds , Liver and Spleen , no hepatomegaly present , no hepatosplenomegaly , liver nontender , spleen not palpable Occupational Therapy    Evaluation    Patient Name: Stacey Heath  MRN: [id number]  Department: [address]  Room: 6015/6015-A  Today's Date: 12/4/2024  Time Calculation  Start Time: 0942  Stop Time: 0954  Time Calculation (min): 12 min    Assessment  IP OT Assessment  OT Assessment: Patient with deficits in ADLs/functional mobility; will benefit from OT services to address deficits and improve functional independence.  Prognosis: Good  Barriers to Discharge:  (medical acuity)  End of Session Communication: Bedside nurse  End of Session Patient Position: Bed, 3 rail up, Alarm off, not on at start of session  Plan:  Treatment Interventions: ADL retraining, Functional transfer training, UE strengthening/ROM, Endurance training, Patient/family training, Equipment evaluation/education, Neuromuscular reeducation, Compensatory technique education  OT Frequency: 2 times per week  OT Discharge Recommendations: No OT needed after discharge  OT Recommended Transfer Status: Stand by assist  OT - OK to Discharge: Yes    Subjective   General:  General  Reason for Referral: SOB, hypoxia; HTN emergency, on cardene gtt, now off gtt.  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  Family/Caregiver Present: No  Prior to Session Communication: Bedside nurse  Patient Position Received: Alarm off, caregiver present (patient EOB with PT present)  General Comment: Patient working with PT on arrival, agreeable to OT.  Precautions:  Hearing/Visual Limitations: hearing is WFL  Medical Precautions: Fall precautions    Pain:  Pain Assessment  Pain Assessment: 0-10  0-10 (Numeric) Pain Score:  (patient did not report having pain)    Objective   Cognition:  Overall Cognitive Status: Within Functional Limits  Arousal/Alertness: Appropriate responses to stimuli  Orientation Level:  (oriented x3)  Following Commands: Follows one step commands  consistently        Hayes Agitation Sedation Scale  Hayes Agitation Sedation Scale (RASS): Alert and calm  Home Living:  Type of Home: House  Lives With:  (family)  Home Adaptive Equipment: Cane, Walker rolling or standard  Home Layout: Two level, Laundry in basement, Bed/bath upstairs  Home Access: Stairs to enter with rails  Entrance Stairs-Number of Steps: 5-6  Bathroom Shower/Tub: Tub/shower unit   Prior Function:  Level of [place]: Independent with ADLs and functional transfers (patient does cooking, family assists with laundry and cleaning)  Receives Help From: Family  ADL Assistance: Independent  Ambulatory Assistance: Independent  Vocational: On disability  Hand Dominance: Right  Prior Function Comments: reports can drive but family does the driving     ADL:  Eating Assistance: Independent  Eating Deficit: Setup (anticipated)  Grooming Assistance: Independent  Grooming Deficit:  (in sitting)  Bathing Assistance: Stand by  Bathing Deficit:  (anticipated)  UE Dressing Assistance: Stand by  UE Dressing Deficit: Supervision/safety  LE Dressing Assistance: Minimal  LE Dressing Deficit: Don/doff R sock, Don/doff L sock  Toileting Assistance with Device: Stand by  Toileting Deficit:  (anticipated)  Activity Tolerance:  Early Mobility/Exercise Safety Screen: Proceed with mobilization - No exclusion criteria met  Bed Mobility/Transfers: Bed Mobility  Bed Mobility: No  Bed Mobility 1  Bed Mobility 1: Sitting to supine  Level of Assistance 1: Close supervision    Transfers  Transfer: Yes  Transfer 1  Transfer From 1: Sit to, Stand to  Transfer to 1: Sit, Stand  Technique 1: Sit to stand, Stand to sit  Transfer Level of Assistance 1: Close supervision  Trials/Comments 1: functional mobility short household distances with no AD and SBA    Sitting Balance:  Static Sitting Balance  Static Sitting-Level of Assistance: Independent  Dynamic Sitting Balance  Dynamic Sitting-Level of Assistance: Distant  supervision  Standing Balance:  Static Standing Balance  Static Standing-Level of Assistance: Close supervision  Dynamic Standing Balance  Dynamic Standing-Level of Assistance: Close supervision     Vision: Vision - Basic Assessment  Current Vision:  (reports wearing glasses as needed)  Sensation:  Light Touch:  (reports neuropathy in (B) feet)  Strength:  Strength Comments: (B)  4-/5, (B) elbows >/= 3/5, (B) shoulders 3-/5     Extremities: RUE   RUE :  (shoulder 0-90 degrees, reports due to shoulder surgery ~2 years ago; distal joints WFL) and LUE   LUE:  (shoulder 0-90 degrees, reports due to shoulder surgery ~2 years ago; distal joints WFL)    Outcome Measures: [facility] Daily Activity  Putting on and taking off regular lower body clothing: A little  Bathing (including washing, rinsing, drying): A little  Putting on and taking off regular upper body clothing: A little  Toileting, which includes using toilet, bedpan or urinal: A little  Taking care of personal grooming such as brushing teeth: None  Eating Meals: None  Daily Activity - Total Score: 20    , Confusion Assessment Method-ICU (CAM-ICU)  Feature 1: Acute Onset or Fluctuating Course: Negative  Feature 3: Altered Level of Consciousness: Negative  Overall CAM-ICU: Negative  , and E = Exercise and Early Mobility  Early Mobility/Exercise Safety Screen: Proceed with mobilization - No exclusion criteria met  ICU Mobility Scale: Walking with assistance of 1 person  Education Documentation  Precautions, taught by Lorena Louis OT at 12/4/2024  2:14 PM.  Learner: Patient  Readiness: Acceptance  Method: Explanation  Response: Verbalizes Understanding, Needs Reinforcement  Comment: ADL participation, mobility precautions    Body Mechanics, taught by Lorena Louis OT at 12/4/2024  2:14 PM.  Learner: Patient  Readiness: Acceptance  Method: Explanation  Response: Verbalizes Understanding, Needs Reinforcement  Comment: ADL participation, mobility  precautions    ADL Training, taught by Lorena Louis OT at 12/4/2024  2:14 PM.  Learner: Patient  Readiness: Acceptance  Method: Explanation  Response: Verbalizes Understanding, Needs Reinforcement  Comment: ADL participation, mobility precautions    Education Comments  No comments found.    Goals:   Encounter Problems       Encounter Problems (Active)       ADLs       Patient with complete upper body dressing with independent level of assistance donning and doffing all UE clothes with no adaptive equipment. (Progressing)       Start:  12/04/24    Expected End:  12/18/24            Patient with complete lower body dressing with independent level of assistance donning and doffing all LE clothes  with PRN adaptive equipment. (Progressing)       Start:  12/04/24    Expected End:  12/18/24            Patient will complete daily grooming tasks with independent level of assistance and PRN adaptive equipment while standing. (Progressing)       Start:  12/04/24    Expected End:  12/18/24            Patient will complete toileting including hygiene clothing management/hygiene with independent level of assistance. (Progressing)       Start:  12/04/24    Expected End:  12/18/24            Patient will perform basic IADLs/simulated household tasks with mod (I). (Progressing)       Start:  12/04/24    Expected End:  12/18/24               EXERCISE/STRENGTHENING       Patient will complete BUE exercises in order to improve strength and activity tolerance for ADL performance.  (Progressing)       Start:  12/04/24    Expected End:  12/18/24            Patient will participate in >15 minutes of activity with <2 rest breaks to increase tolerance for ADL/functional task engagement. (Progressing)       Start:  12/04/24    Expected End:  12/18/24               MOBILITY       Patient will perform Functional mobility Household distances/Community Distances with independent level of assistance and least restrictive device in order to  improve safety and functional mobility. (Progressing)       Start:  12/04/24    Expected End:  12/18/24               TRANSFERS       Patient will perform bed mobility independent level of assistance in order to improve safety and independence with mobility (Progressing)       Start:  12/04/24    Expected End:  12/18/24            Patient will complete functional transfers with least restrictive device with independent level of assistance. (Progressing)       Start:  12/04/24    Expected End:  12/18/24               Lorena Louis OTR/L  Inpatient Occupational Therapist   Rehab Office: [phone]

## 2024-12-04 NOTE — CARE PLAN
Problem: Skin  Goal: Decreased wound size/increased tissue granulation at next dressing change  Outcome: Progressing  Flowsheets (Taken 12/4/2024 0837)  Decreased wound size/increased tissue granulation at next dressing change: Promote sleep for wound healing  Goal: Participates in plan/prevention/treatment measures  Outcome: Progressing  Flowsheets (Taken 12/4/2024 0837)  Participates in plan/prevention/treatment measures: Increase activity/out of bed for meals  Goal: Prevent/manage excess moisture  Outcome: Progressing  Flowsheets (Taken 12/4/2024 0837)  Prevent/manage excess moisture: Moisturize dry skin  Goal: Prevent/minimize sheer/friction injuries  Outcome: Progressing  Flowsheets (Taken 12/4/2024 0837)  Prevent/minimize sheer/friction injuries:   HOB 30 degrees or less   Turn/reposition every 2 hours/use positioning/transfer devices  Goal: Promote/optimize nutrition  Outcome: Progressing  Flowsheets (Taken 12/4/2024 0837)  Promote/optimize nutrition: Monitor/record intake including meals  Goal: Promote skin healing  Outcome: Progressing  Flowsheets (Taken 12/4/2024 0837)  Promote skin healing: Assess skin/pad under line(s)/device(s)

## 2024-12-05 ENCOUNTER — APPOINTMENT (OUTPATIENT)
Dept: DIALYSIS | Facility: HOSPITAL | Age: 54
End: 2024-12-05
Payer: COMMERCIAL

## 2024-12-05 ENCOUNTER — DOCUMENTATION (OUTPATIENT)
Dept: TRANSPLANT | Facility: HOSPITAL | Age: 54
End: 2024-12-05
Payer: COMMERCIAL

## 2024-12-05 LAB
ALBUMIN SERPL BCP-MCNC: 3.3 G/DL (ref 3.4–5)
ANION GAP SERPL CALC-SCNC: 20 MMOL/L (ref 10–20)
BUN SERPL-MCNC: 54 MG/DL (ref 6–23)
CALCIUM SERPL-MCNC: 8.8 MG/DL (ref 8.6–10.6)
CHLORIDE SERPL-SCNC: 97 MMOL/L (ref 98–107)
CO2 SERPL-SCNC: 26 MMOL/L (ref 21–32)
CREAT SERPL-MCNC: 8.91 MG/DL (ref 0.5–1.05)
EGFRCR SERPLBLD CKD-EPI 2021: 5 ML/MIN/1.73M*2
ERYTHROCYTE [DISTWIDTH] IN BLOOD BY AUTOMATED COUNT: 18.8 % (ref 11.5–14.5)
GLUCOSE SERPL-MCNC: 66 MG/DL (ref 74–99)
HCT VFR BLD AUTO: 33.4 % (ref 36–46)
HGB BLD-MCNC: 9.9 G/DL (ref 12–16)
MCH RBC QN AUTO: 29.4 PG (ref 26–34)
MCHC RBC AUTO-ENTMCNC: 29.6 G/DL (ref 32–36)
MCV RBC AUTO: 99 FL (ref 80–100)
NRBC BLD-RTO: 0 /100 WBCS (ref 0–0)
PHOSPHATE SERPL-MCNC: 7.8 MG/DL (ref 2.5–4.9)
PLATELET # BLD AUTO: 167 X10*3/UL (ref 150–450)
POTASSIUM SERPL-SCNC: 4.7 MMOL/L (ref 3.5–5.3)
RBC # BLD AUTO: 3.37 X10*6/UL (ref 4–5.2)
SODIUM SERPL-SCNC: 138 MMOL/L (ref 136–145)
WBC # BLD AUTO: 4.8 X10*3/UL (ref 4.4–11.3)

## 2024-12-05 PROCEDURE — 2500000004 HC RX 250 GENERAL PHARMACY W/ HCPCS (ALT 636 FOR OP/ED): Performed by: NURSE PRACTITIONER

## 2024-12-05 PROCEDURE — 85027 COMPLETE CBC AUTOMATED: CPT | Performed by: NURSE PRACTITIONER

## 2024-12-05 PROCEDURE — 2500000002 HC RX 250 W HCPCS SELF ADMINISTERED DRUGS (ALT 637 FOR MEDICARE OP, ALT 636 FOR OP/ED): Performed by: NURSE PRACTITIONER

## 2024-12-05 PROCEDURE — 36415 COLL VENOUS BLD VENIPUNCTURE: CPT | Performed by: NURSE PRACTITIONER

## 2024-12-05 PROCEDURE — 99233 SBSQ HOSP IP/OBS HIGH 50: CPT | Performed by: INTERNAL MEDICINE

## 2024-12-05 PROCEDURE — 2060000001 HC INTERMEDIATE ICU ROOM DAILY

## 2024-12-05 PROCEDURE — 80069 RENAL FUNCTION PANEL: CPT | Performed by: NURSE PRACTITIONER

## 2024-12-05 PROCEDURE — 2500000001 HC RX 250 WO HCPCS SELF ADMINISTERED DRUGS (ALT 637 FOR MEDICARE OP): Performed by: NURSE PRACTITIONER

## 2024-12-05 PROCEDURE — 90935 HEMODIALYSIS ONE EVALUATION: CPT | Performed by: INTERNAL MEDICINE

## 2024-12-05 PROCEDURE — 2500000005 HC RX 250 GENERAL PHARMACY W/O HCPCS: Performed by: NURSE PRACTITIONER

## 2024-12-05 PROCEDURE — 94640 AIRWAY INHALATION TREATMENT: CPT

## 2024-12-05 RX ORDER — HYDRALAZINE HYDROCHLORIDE 20 MG/ML
20 INJECTION INTRAMUSCULAR; INTRAVENOUS ONCE
Status: COMPLETED | OUTPATIENT
Start: 2024-12-05 | End: 2024-12-05

## 2024-12-05 RX ORDER — HYDRALAZINE HYDROCHLORIDE 20 MG/ML
10 INJECTION INTRAMUSCULAR; INTRAVENOUS EVERY 6 HOURS PRN
Status: DISCONTINUED | OUTPATIENT
Start: 2024-12-05 | End: 2024-12-06

## 2024-12-05 RX ORDER — CARVEDILOL 25 MG/1
25 TABLET ORAL 2 TIMES DAILY
Status: DISPENSED | OUTPATIENT
Start: 2024-12-05

## 2024-12-05 RX ORDER — ONDANSETRON HYDROCHLORIDE 2 MG/ML
4 INJECTION, SOLUTION INTRAVENOUS EVERY 6 HOURS PRN
Status: DISPENSED | OUTPATIENT
Start: 2024-12-05

## 2024-12-05 RX ORDER — NICARDIPINE HYDROCHLORIDE 0.2 MG/ML
2.5-15 INJECTION INTRAVENOUS CONTINUOUS
Status: CANCELLED | OUTPATIENT
Start: 2024-12-05

## 2024-12-05 RX ORDER — CLONIDINE HYDROCHLORIDE 0.1 MG/1
0.1 TABLET ORAL 2 TIMES DAILY
Status: DISCONTINUED | OUTPATIENT
Start: 2024-12-05 | End: 2024-12-08

## 2024-12-05 RX ORDER — ACETAMINOPHEN 325 MG/1
650 TABLET ORAL EVERY 6 HOURS PRN
Status: DISPENSED | OUTPATIENT
Start: 2024-12-05

## 2024-12-05 RX ORDER — NIFEDIPINE 90 MG/1
90 TABLET, EXTENDED RELEASE ORAL NIGHTLY
Status: DISPENSED | OUTPATIENT
Start: 2024-12-05

## 2024-12-05 ASSESSMENT — COGNITIVE AND FUNCTIONAL STATUS - GENERAL
MOBILITY SCORE: 24
DAILY ACTIVITIY SCORE: 24
DAILY ACTIVITIY SCORE: 24
MOBILITY SCORE: 24

## 2024-12-05 ASSESSMENT — PAIN SCALES - GENERAL
PAINLEVEL_OUTOF10: 2
PAINLEVEL_OUTOF10: 0 - NO PAIN
PAINLEVEL_OUTOF10: 5 - MODERATE PAIN
PAINLEVEL_OUTOF10: 0 - NO PAIN
PAINLEVEL_OUTOF10: 5 - MODERATE PAIN

## 2024-12-05 ASSESSMENT — PAIN DESCRIPTION - LOCATION
LOCATION: HEAD
LOCATION: HEAD

## 2024-12-05 NOTE — PROGRESS NOTES
"Medical Intensive Care Step down- Daily Progress Note   Subjective    Stacey Heath is a 54 y.o. year old female patient admitted on 12/3/2024 with following ICU needs: hypertensive urgency and pulmonary edema    Interval History:  Nocturnal BIPAP over night.  Pox 95s on RA.  SBP 150s-190s.  Patiant only took her hydralazine this am, and refuses to take other antiHTN meds d/t plan for HD today (and she usually \"bottoms down\" when takes BP meds prior to dialysis     Meds    Scheduled medications  apixaban, 5 mg, oral, BID  aspirin, 81 mg, oral, Daily  atorvastatin, 80 mg, oral, Nightly  calcium acetate, 1,334 mg, oral, TID  carvedilol, 50 mg, oral, BID  [Held by provider] cloNIDine, 1 patch, transdermal, Daily  doxazosin, 2 mg, oral, Daily  epoetin joceline-epbx, 10,000 Units, subcutaneous, Once per day on Monday Wednesday Friday  fluticasone, 2 spray, Each Nostril, Daily  fluticasone furoate-vilanteroL, 1 puff, inhalation, Daily  gabapentin, 300 mg, oral, Nightly  hydrALAZINE, 100 mg, oral, TID  isosorbide mononitrate ER, 120 mg, oral, Daily  NIFEdipine ER, 90 mg, oral, Daily before breakfast  oxygen, , inhalation, Continuous - Inhalation  pantoprazole, 40 mg, oral, Daily before breakfast  polyethylene glycol, 17 g, oral, Daily  valsartan, 320 mg, oral, q PM  vitamin B complex-vitamin C-folic acid, 1 capsule, oral, Daily      Continuous medications     PRN medications  PRN medications: albuterol, albuterol, cyclobenzaprine, hydrOXYzine HCL, SUMAtriptan     Objective    Blood pressure 159/77, pulse 84, temperature 36.2 °C (97.1 °F), temperature source Temporal, resp. rate 16, height 1.397 m (4' 7\"), weight 60.5 kg (133 lb 6.1 oz), SpO2 100%.     Physical Exam:  Constitutional: pt in NAD, alert and cooperative  Eyes: PERRL, EOMI, no icterus   ENMT: mucous membranes moist, no apparent injury, no lesions seen  Head/Neck: Neck supple, no apparent injury  Respiratory/Thorax: Lungs CTA bilaterally, non-labored breathing, no " cough, on RA  Cardiovascular: Regular, rate and rhythm  Gastrointestinal: Nondistended, soft, non-tender, BS present x 4  : voiding  Musculoskeletal: ROM intact, no joint swelling, normal strength  Extremities: normal extremities, no edema, right arm AVF +thrill, +bruit  Neurological: alert and oriented x 3, speech clear, follows commands, no focal deficits  Skin: Warm and dry, no lesions, no rashes       Intake/Output Summary (Last 24 hours) at 12/5/2024 0839  Last data filed at 12/4/2024 1303  Gross per 24 hour   Intake 500 ml   Output --   Net 500 ml     Labs:   Results from last 72 hours   Lab Units 12/05/24  0628 12/03/24  2126   SODIUM mmol/L 138 138   POTASSIUM mmol/L 4.7 5.7*   CHLORIDE mmol/L 97* 98   CO2 mmol/L 26 27   BUN mg/dL 54* 28*   CREATININE mg/dL 8.91* 5.70*   GLUCOSE mg/dL 66* 109*   CALCIUM mg/dL 8.8 8.8   ANION GAP mmol/L 20 19   EGFR mL/min/1.73m*2 5* 8*   PHOSPHORUS mg/dL 7.8*  --       Results from last 72 hours   Lab Units 12/05/24  0628 12/03/24  2126   WBC AUTO x10*3/uL 4.8 5.0   HEMOGLOBIN g/dL 9.9* 11.6*   HEMATOCRIT % 33.4* 37.2   PLATELETS AUTO x10*3/uL 167 185   NEUTROS PCT AUTO %  --  59.9   LYMPHS PCT AUTO %  --  22.2   MONOS PCT AUTO %  --  6.0   EOS PCT AUTO %  --  10.3          Results from last 72 hours   Lab Units 12/04/24  0111 12/03/24 2118   POCT PH, VENOUS pH 7.42 7.34   POCT PCO2, VENOUS mm Hg 49 57*   POCT PO2, VENOUS mm Hg 48* 35        Micro/ID:     Lab Results   Component Value Date    URINECULTURE NO SIGNIFICANT GROWTH. 07/09/2023    BLOODCULT  08/21/2023     No Growth at 1 days~No Growth at 2 days~No Growth at 3 days~NO GROWTH at 4 days - FINAL REPORT       Summary of key imaging results    XR chest 1 view 12/3/2024    1. Prominence of the interstitial markings likely representing pulmonary edema, slightly improved compared to 11/26/2024.   2. Small left-greater-than-right pleural effusions and adjacent atelectasis.       Assessment and Plan     Assessment:  Stacey Heath is a 54 y.o. year old female patient admitted on 12/3/2024 with hypertensive urgency and pulmonary edema    Restraints: no    Summary for 12/05/24  :  Plan for HD today  Weaned down to RA    Plan:  NEUROLOGY/PSYCH:  Dx:  hx peripheral neuropathy  Management:  Continue Gabapentin 300 mg HS  PRN tylenol for headache and home imitrex  Continue home flexeril   PRN atarax   PT/OT/OOB    CARDIOVASCULAR:  Dx:  hx HFpEF, HTN, admitted with hypertensive emergency   Home meds: Coreg 50 mg BID, Clonidine 0.2 mg/24 hr patch- 1 patch on skin every friday days, hydralazine 100 mg TID , Imdur 120 mg every day , Nifedipine 90 mg every day , Valsartan 320 mg every day   Required Cardene gtt and NTG gtt in the ICU, now off  TTE 04/2024- LVEF 60-65% with pseudonormal pattern of left ventricular diastolic filling.   Renal US 2023 --> Doppler evaluation compatible with history of chronic renal failure on dialysis.     Management:  Restarted home BP meds, patient does not take in am prior to HD.  Was planning to bring all her home meds to review with her primary nephrologist (Dr Barraza)    PULMONARY:  FiO2 (%):  [21 %-40 %] 40 %  S RR:  [12] 12   Dx: AHRF 2/2 pulmonary edema, hx COPD w/o acute exacerbation   Weaned to RA (was up to 11L in ED/ICU)  BIPAP at night, although poor tolerance   Management:  Diurese with home torsemide on non-HD days  Continue home Breo Ellipta 100-25 1 puff daily, albuterol q4     RENAL/GENITOURINARY:  Dx:  ESRD on HD (T,Th, Sat) via R AVF  home meds torsemide 40mg on non dialysis days, nephrocaps   Management:  Neprology following  Plan for HD today (scheduled for 1300)    GASTROENTEROLOGY:  Dx:  hx GERD  Management:  Continue with home PPI  Renal diet    HEMATOLOGY:  Dx: normocytic anemia likely 2/2 renal dz, hx LUE DVT  per chart review appears that brachial vein DVT in setting of PICC --> provoked   Management:  Continue with home Eliquis (suspect can be discontinued as > 6 month duration  of therapy has now been completed    INFECTIOUS DISEASE:  Dx: no s/sx infection   Tmax 36.2, WBC 4.8  Management:  No need for ABX    ICU Check List       FEN  Fluids: PRN  Electrolytes: PRN  Nutrition: renal diet  Prophylaxis:  DVT ppx: Eliquis  GI ppx: PPI  Bowel care: Miralax  Hardware:            Social:  Code: Full Code    HPOA: Daughter, Diya Perry, [phone]   Disposition: continue SDU care for now, consider transfer to medicine.  Patient lives at home with her           Chela Cam, LILLY-CNP   12/05/24 at 8:39 AM     Pt discussed with Dr. Moreland seen and examined. All labs, VS and previous plan of care reviewed.     Disclaimer: Documentation completed with the information available at the time of input. The times in the chart may not be reflective of actual patient care times, interventions, or procedures. Documentation occurs after the physical care of the patient.

## 2024-12-05 NOTE — H&P
Renal Staff HD Note    Patient seen, examined on dialysis   Tolerating treatment without event    BP Readings from Last 3 Encounters:   12/05/24 180/72   12/01/24 (!) 215/71   11/27/24 (!) 196/85     [unfilled]  Lab Results   Component Value Date    CREATININE 8.91 (H) 12/05/2024    BUN 54 (H) 12/05/2024     12/05/2024    K 4.7 12/05/2024    CL 97 (L) 12/05/2024    CO2 26 12/05/2024     Lab Results   Component Value Date    PTH 1,415.0 (H) 05/31/2024    CALCIUM 8.8 12/05/2024    CAION 1.18 03/14/2023    PHOS 7.8 (H) 12/05/2024     @  Lab Results   Component Value Date    HGB 9.9 (L) 12/05/2024       Continue treatment per submitted orders    Carvedilol 25 mg twice daily  Valsartan 320 mg daily  Nifedipine 90 mg daily  Clonidine 0.1 twice daily    Makenna Barraza MD

## 2024-12-05 NOTE — ED PROCEDURE NOTE
Procedure  Critical Care    Performed by: Armando Meehan MD  Authorized by: Armando Meehan MD    Critical care provider statement:     Critical care time (minutes):  16    Critical care time was exclusive of:  Separately billable procedures and treating other patients and teaching time    Critical care was necessary to treat or prevent imminent or life-threatening deterioration of the following conditions:  Respiratory failure    Critical care was time spent personally by me on the following activities:  Ordering and performing treatments and interventions, development of treatment plan with patient or surrogate, ordering and review of laboratory studies, discussions with consultants, ordering and review of radiographic studies, pulse oximetry, evaluation of patient's response to treatment, re-evaluation of patient's condition, examination of patient and review of old charts    Care discussed with: admitting provider                 Armando Meehan MD  12/05/24 5920

## 2024-12-05 NOTE — PROGRESS NOTES
12/05/24 1400   Discharge Planning   Living Arrangements Spouse/significant other   Support Systems Spouse/significant other   Type of Residence Private residence   Who is requesting discharge planning? Provider   Home or Post Acute Services In home services   Type of Home Care Services Home PT   Expected Discharge Disposition Home Health     Met with patient at bedside. PT recommends home care. Patient agree[facility] to [facility]. TCC/SW to continue to follow. ADOD 12/7. Laina Kate RN TCC

## 2024-12-06 ENCOUNTER — TELEPHONE (OUTPATIENT)
Dept: TRANSPLANT | Facility: HOSPITAL | Age: 54
End: 2024-12-06
Payer: COMMERCIAL

## 2024-12-06 ENCOUNTER — DOCUMENTATION (OUTPATIENT)
Dept: BEHAVIORAL HEALTH | Facility: CLINIC | Age: 54
End: 2024-12-06
Payer: COMMERCIAL

## 2024-12-06 ENCOUNTER — HOME CARE VISIT (OUTPATIENT)
Dept: HOME HEALTH SERVICES | Facility: HOME HEALTH | Age: 54
End: 2024-12-06
Payer: COMMERCIAL

## 2024-12-06 ENCOUNTER — OFFICE VISIT (OUTPATIENT)
Dept: SURGICAL ONCOLOGY | Facility: HOSPITAL | Age: 54
DRG: 304 | End: 2024-12-06
Payer: COMMERCIAL

## 2024-12-06 ENCOUNTER — DOCUMENTATION (OUTPATIENT)
Dept: BEHAVIORAL HEALTH | Facility: CLINIC | Age: 54
End: 2024-12-06

## 2024-12-06 ENCOUNTER — APPOINTMENT (OUTPATIENT)
Dept: DIALYSIS | Facility: HOSPITAL | Age: 54
End: 2024-12-06
Payer: COMMERCIAL

## 2024-12-06 LAB
ALBUMIN SERPL BCP-MCNC: 3.5 G/DL (ref 3.4–5)
ANION GAP SERPL CALC-SCNC: 15 MMOL/L (ref 10–20)
BUN SERPL-MCNC: 21 MG/DL (ref 6–23)
CALCIUM SERPL-MCNC: 8.8 MG/DL (ref 8.6–10.6)
CARDIAC TROPONIN I PNL SERPL HS: 44 NG/L (ref 0–34)
CHLORIDE SERPL-SCNC: 95 MMOL/L (ref 98–107)
CO2 SERPL-SCNC: 30 MMOL/L (ref 21–32)
CREAT SERPL-MCNC: 4.76 MG/DL (ref 0.5–1.05)
EGFRCR SERPLBLD CKD-EPI 2021: 10 ML/MIN/1.73M*2
ERYTHROCYTE [DISTWIDTH] IN BLOOD BY AUTOMATED COUNT: 18.2 % (ref 11.5–14.5)
GLUCOSE SERPL-MCNC: 64 MG/DL (ref 74–99)
HCT VFR BLD AUTO: 33.7 % (ref 36–46)
HGB BLD-MCNC: 10.2 G/DL (ref 12–16)
MAGNESIUM SERPL-MCNC: 2.32 MG/DL (ref 1.6–2.4)
MCH RBC QN AUTO: 29.6 PG (ref 26–34)
MCHC RBC AUTO-ENTMCNC: 30.3 G/DL (ref 32–36)
MCV RBC AUTO: 98 FL (ref 80–100)
NRBC BLD-RTO: 0 /100 WBCS (ref 0–0)
PHOSPHATE SERPL-MCNC: 6.7 MG/DL (ref 2.5–4.9)
PLATELET # BLD AUTO: 153 X10*3/UL (ref 150–450)
POTASSIUM SERPL-SCNC: 5.1 MMOL/L (ref 3.5–5.3)
RBC # BLD AUTO: 3.45 X10*6/UL (ref 4–5.2)
SODIUM SERPL-SCNC: 135 MMOL/L (ref 136–145)
WBC # BLD AUTO: 4 X10*3/UL (ref 4.4–11.3)

## 2024-12-06 PROCEDURE — 94640 AIRWAY INHALATION TREATMENT: CPT

## 2024-12-06 PROCEDURE — 80069 RENAL FUNCTION PANEL: CPT | Performed by: NURSE PRACTITIONER

## 2024-12-06 PROCEDURE — 2500000002 HC RX 250 W HCPCS SELF ADMINISTERED DRUGS (ALT 637 FOR MEDICARE OP, ALT 636 FOR OP/ED): Performed by: NURSE PRACTITIONER

## 2024-12-06 PROCEDURE — 2500000004 HC RX 250 GENERAL PHARMACY W/ HCPCS (ALT 636 FOR OP/ED): Performed by: NURSE PRACTITIONER

## 2024-12-06 PROCEDURE — 2060000001 HC INTERMEDIATE ICU ROOM DAILY

## 2024-12-06 PROCEDURE — 84484 ASSAY OF TROPONIN QUANT: CPT | Performed by: NURSE PRACTITIONER

## 2024-12-06 PROCEDURE — 6350000001 HC RX 635 EPOETIN >10,000 UNITS: Mod: JZ | Performed by: NURSE PRACTITIONER

## 2024-12-06 PROCEDURE — 85027 COMPLETE CBC AUTOMATED: CPT | Performed by: NURSE PRACTITIONER

## 2024-12-06 PROCEDURE — 93005 ELECTROCARDIOGRAM TRACING: CPT

## 2024-12-06 PROCEDURE — 2500000001 HC RX 250 WO HCPCS SELF ADMINISTERED DRUGS (ALT 637 FOR MEDICARE OP): Performed by: NURSE PRACTITIONER

## 2024-12-06 PROCEDURE — 36415 COLL VENOUS BLD VENIPUNCTURE: CPT | Performed by: NURSE PRACTITIONER

## 2024-12-06 PROCEDURE — 83735 ASSAY OF MAGNESIUM: CPT | Performed by: NURSE PRACTITIONER

## 2024-12-06 PROCEDURE — 2500000005 HC RX 250 GENERAL PHARMACY W/O HCPCS: Performed by: NURSE PRACTITIONER

## 2024-12-06 PROCEDURE — 93010 ELECTROCARDIOGRAM REPORT: CPT | Performed by: INTERNAL MEDICINE

## 2024-12-06 RX ORDER — DEXTROSE 50 % IN WATER (D50W) INTRAVENOUS SYRINGE
25
Status: ACTIVE | OUTPATIENT
Start: 2024-12-06

## 2024-12-06 RX ORDER — CLONIDINE HYDROCHLORIDE 0.1 MG/1
0.1 TABLET ORAL EVERY 8 HOURS PRN
Status: DISCONTINUED | OUTPATIENT
Start: 2024-12-06 | End: 2024-12-07

## 2024-12-06 RX ORDER — DEXTROSE 50 % IN WATER (D50W) INTRAVENOUS SYRINGE
12.5
Status: ACTIVE | OUTPATIENT
Start: 2024-12-06

## 2024-12-06 ASSESSMENT — COGNITIVE AND FUNCTIONAL STATUS - GENERAL
DAILY ACTIVITIY SCORE: 24
MOBILITY SCORE: 24

## 2024-12-06 ASSESSMENT — PAIN SCALES - GENERAL
PAINLEVEL_OUTOF10: 2
PAINLEVEL_OUTOF10: 6

## 2024-12-06 ASSESSMENT — PAIN DESCRIPTION - LOCATION: LOCATION: OTHER (COMMENT)

## 2024-12-06 ASSESSMENT — PAIN - FUNCTIONAL ASSESSMENT: PAIN_FUNCTIONAL_ASSESSMENT: 0-10

## 2024-12-06 NOTE — CARE PLAN
Problem: Skin  Goal: Decreased wound size/increased tissue granulation at next dressing change  Outcome: Progressing  Goal: Participates in plan/prevention/treatment measures  Outcome: Progressing  Goal: Prevent/manage excess moisture  Outcome: Progressing  Goal: Prevent/minimize sheer/friction injuries  Outcome: Progressing  Goal: Promote/optimize nutrition  Outcome: Progressing  Goal: Promote skin healing  Outcome: Progressing  Flowsheets (Taken 12/6/2024 0141)  Promote skin healing:   Turn/reposition every 2 hours/use positioning/transfer devices   Rotate device position/do not position patient on device   Protective dressings over bony prominences   Ensure correct size (line/device) and apply per  instructions   Assess skin/pad under line(s)/device(s)     Problem: Pain  Goal: Takes deep breaths with improved pain control throughout the shift  Outcome: Progressing  Goal: Turns in bed with improved pain control throughout the shift  Outcome: Progressing  Goal: Walks with improved pain control throughout the shift  Outcome: Progressing  Goal: Performs ADL's with improved pain control throughout shift  Outcome: Progressing  Goal: Participates in PT with improved pain control throughout the shift  Outcome: Progressing  Goal: Free from opioid side effects throughout the shift  Outcome: Progressing  Goal: Free from acute confusion related to pain meds throughout the shift  Outcome: Progressing     Problem: Fall/Injury  Goal: Not fall by end of shift  Outcome: Progressing  Goal: Be free from injury by end of the shift  Outcome: Progressing  Goal: Verbalize understanding of personal risk factors for fall in the hospital  Outcome: Progressing  Goal: Verbalize understanding of risk factor reduction measures to prevent injury from fall in the home  Outcome: Progressing  Goal: Use assistive devices by end of the shift  Outcome: Progressing     Problem: Pain  Goal: Takes deep breaths with improved pain control  throughout the shift  Outcome: Progressing     Problem: Fall/Injury  Goal: Not fall by end of shift  Outcome: Progressing   The patient's goals for the shift include      The clinical goals for the shift include pt will remain HDS for the shift       Pyelonephritis

## 2024-12-06 NOTE — NURSING NOTE
2300 bp 204/62 pt was medicated with hydralazine 20mg ivp. Now bp down to 158/71, hr 74. Pt in bed resting no distress noted.

## 2024-12-06 NOTE — PROGRESS NOTES
"Medical Intensive Care Step down- Daily Progress Note   Subjective    tSacey Heath is a 54 y.o. year old female patient admitted on 12/3/2024 with following ICU needs: hypertensive urgency and pulmonary edema    Interval History:  Patient remained hypertensive over night SBP >200s, improvefd after PRN Hydralazine and adjusting BP meds    Meds    Scheduled medications  apixaban, 5 mg, oral, BID  aspirin, 81 mg, oral, Daily  atorvastatin, 80 mg, oral, Nightly  calcium acetate, 1,334 mg, oral, TID  carvedilol, 25 mg, oral, BID  cloNIDine, 0.1 mg, oral, BID  epoetin joceline-epbx, 10,000 Units, subcutaneous, Once per day on Monday Wednesday Friday  fluticasone, 2 spray, Each Nostril, Daily  fluticasone furoate-vilanteroL, 1 puff, inhalation, Daily  gabapentin, 300 mg, oral, Nightly  NIFEdipine ER, 90 mg, oral, Nightly  oxygen, , inhalation, Continuous - Inhalation  pantoprazole, 40 mg, oral, Daily before breakfast  polyethylene glycol, 17 g, oral, Daily  valsartan, 320 mg, oral, q PM  vitamin B complex-vitamin C-folic acid, 1 capsule, oral, Daily      Continuous medications     PRN medications  PRN medications: acetaminophen, albuterol, albuterol, cyclobenzaprine, dextrose, dextrose, glucagon, glucagon, hydrALAZINE, hydrOXYzine HCL, ondansetron, SUMAtriptan     Objective    Blood pressure 145/62, pulse 73, temperature 36 °C (96.8 °F), temperature source Temporal, resp. rate 14, height 1.397 m (4' 7\"), weight 60.5 kg (133 lb 6.1 oz), SpO2 100%.     Physical Exam:  Constitutional: pt in NAD, alert and cooperative  Eyes: PERRL, EOMI, no icterus   ENMT: mucous membranes moist, no apparent injury, no lesions seen  Head/Neck: Neck supple, no apparent injury  Respiratory/Thorax: Lungs CTA bilaterally, non-labored breathing, no cough, on RA  Cardiovascular: Regular, rate and rhythm  Gastrointestinal: Nondistended, soft, non-tender, BS present x 4  : voiding  Musculoskeletal: ROM intact, no joint swelling, normal " strength  Extremities: normal extremities, no edema, right arm AVF +thrill, +bruit  Neurological: alert and oriented x 3, speech clear, follows commands, no focal deficits  Skin: Warm and dry, no lesions, no rashes       Intake/Output Summary (Last 24 hours) at 12/6/2024 0703  Last data filed at 12/5/2024 2000  Gross per 24 hour   Intake 1280 ml   Output 3400 ml   Net -2120 ml     Labs:   Results from last 72 hours   Lab Units 12/06/24  0407 12/05/24 0628 12/03/24 2126   SODIUM mmol/L 135* 138 138   POTASSIUM mmol/L 5.1 4.7 5.7*   CHLORIDE mmol/L 95* 97* 98   CO2 mmol/L 30 26 27   BUN mg/dL 21 54* 28*   CREATININE mg/dL 4.76* 8.91* 5.70*   GLUCOSE mg/dL 64* 66* 109*   CALCIUM mg/dL 8.8 8.8 8.8   ANION GAP mmol/L 15 20 19   EGFR mL/min/1.73m*2 10* 5* 8*   PHOSPHORUS mg/dL 6.7* 7.8*  --       Results from last 72 hours   Lab Units 12/06/24  0407 12/05/24 0628 12/03/24 2126   WBC AUTO x10*3/uL 4.0* 4.8 5.0   HEMOGLOBIN g/dL 10.2* 9.9* 11.6*   HEMATOCRIT % 33.7* 33.4* 37.2   PLATELETS AUTO x10*3/uL 153 167 185   NEUTROS PCT AUTO %  --   --  59.9   LYMPHS PCT AUTO %  --   --  22.2   MONOS PCT AUTO %  --   --  6.0   EOS PCT AUTO %  --   --  10.3          Results from last 72 hours   Lab Units 12/04/24  0111 12/03/24 2118   POCT PH, VENOUS pH 7.42 7.34   POCT PCO2, VENOUS mm Hg 49 57*   POCT PO2, VENOUS mm Hg 48* 35        Micro/ID:     Lab Results   Component Value Date    URINECULTURE NO SIGNIFICANT GROWTH. 07/09/2023    BLOODCULT  08/21/2023     No Growth at 1 days~No Growth at 2 days~No Growth at 3 days~NO GROWTH at 4 days - FINAL REPORT       Summary of key imaging results    XR chest 1 view 12/3/2024    1. Prominence of the interstitial markings likely representing pulmonary edema, slightly improved compared to 11/26/2024.   2. Small left-greater-than-right pleural effusions and adjacent atelectasis.       Assessment and Plan     Assessment: Stacey Heath is a 54 y.o. year old female patient admitted on  12/3/2024 with hypertensive urgency and pulmonary edema    Restraints: no    Summary for 24  :  S/p HD on  with 3L fluid removed  Patient's BP meds were reviewed and adjusted with her primary nephrologist Dr Barraza. Her final antiHTN regimen: Carvedilol 25 mg twice daily, Valsartan 320 mg daily, Nifedipine 90 mg daily, Clonidine 0.1 twice daily and PRN Clonidine 0.1 mg TID for SBP >180    Plan:  NEUROLOGY/PSYCH:  Dx:  hx peripheral neuropathy  Management:  Continue Gabapentin 300 mg HS  PRN tylenol for headache and home imitrex  Continue home flexeril   PRN atarax   PT/OT/OOB    CARDIOVASCULAR:  Dx:  hx HFpEF, HTN, admitted with hypertensive emergency   Home meds: Coreg 50 mg BID, Clonidine 0.2 mg/24 hr patch- 1 patch on skin every friday days, hydralazine 100 mg TID , Imdur 120 mg every day , Nifedipine 90 mg every day , Valsartan 320 mg every day   Required Cardene gtt and NTG gtt in the ICU, now off  TTE 2024- LVEF 60-65% with pseudonormal pattern of left ventricular diastolic filling.   Renal US  --> Doppler evaluation compatible with history of chronic renal failure on dialysis.     Management:  Reviewed all patient's BP meds with her primary nephrologist Dr Barraza. Appears she is non-compliant with her meds and has multiple  and double dosed meds.   Her final antiHTN regimen : Carvedilol 25 mg twice daily, Valsartan 320 mg daily, Nifedipine 90 mg daily, Clonidine 0.1 twice daily and PRN Clonidine 0.1 mg TID for SBP >180    PULMONARY:  FiO2 (%):  [28 %] 28 %   Dx: AHRF 2/2 pulmonary edema, hx COPD w/o acute exacerbation   Weaned to RA (was up to 11L in ED/ICU)  Was on nocturnal BIPAP, now off  Management:  Diurese with home torsemide on non-HD days  Continue home Breo Ellipta 100-25 1 puff daily, albuterol q4     RENAL/GENITOURINARY:  Dx:  ESRD on HD (T,Th, Sat) via R AVF  home meds torsemide 40mg on non dialysis days, nephrocaps   Management:  Neprology following  S/p HD on  with  3L removed  Plan for UF today    GASTROENTEROLOGY:  Dx:  hx GERD  Management:  Continue with home PPI  Renal diet    HEMATOLOGY:  Dx: normocytic anemia likely 2/2 renal dz, hx LUE DVT  per chart review appears that brachial vein DVT in setting of PICC --> provoked   Management:  Continue with home Eliquis (suspect can be discontinued as > 6 month duration of therapy has now been completed    INFECTIOUS DISEASE:  Dx: no s/sx infection   Tmax 36.0, WBC 4.0  Management:  No need for ABX    ICU Check List       FEN  Fluids: PRN  Electrolytes: PRN  Nutrition: renal diet  Prophylaxis:  DVT ppx: Eliquis  GI ppx: PPI  Bowel care: Miralax  Hardware:            Social:  Code: Full Code    HPOA: Daughter, Diya Perry, [phone]   Disposition: continue SDU care for now, consider transfer to medicine when BP med regimen is established and after UF today (will transfer either later tonight or tomorrow ) .  Patient lives at home with her           Chela Cam, LILLY-CNP   12/06/24 at 7:03 AM     Pt discussed with Dr. Moreland seen and examined. All labs, VS and previous plan of care reviewed.     Disclaimer: Documentation completed with the information available at the time of input. The times in the chart may not be reflective of actual patient care times, interventions, or procedures. Documentation occurs after the physical care of the patient.

## 2024-12-06 NOTE — CARE PLAN
Problem: Skin  Goal: Decreased wound size/increased tissue granulation at next dressing change  Outcome: Progressing  Goal: Participates in plan/prevention/treatment measures  Outcome: Progressing  Goal: Prevent/manage excess moisture  Outcome: Progressing  Goal: Prevent/minimize sheer/friction injuries  Outcome: Progressing  Goal: Promote/optimize nutrition  Outcome: Progressing  Goal: Promote skin healing  Outcome: Progressing     Problem: Pain  Goal: Takes deep breaths with improved pain control throughout the shift  Outcome: Progressing  Goal: Turns in bed with improved pain control throughout the shift  Outcome: Progressing  Goal: Walks with improved pain control throughout the shift  Outcome: Progressing  Goal: Performs ADL's with improved pain control throughout shift  Outcome: Progressing  Goal: Participates in PT with improved pain control throughout the shift  Outcome: Progressing  Goal: Free from opioid side effects throughout the shift  Outcome: Progressing  Goal: Free from acute confusion related to pain meds throughout the shift  Outcome: Progressing     Problem: Fall/Injury  Goal: Not fall by end of shift  Outcome: Progressing  Goal: Be free from injury by end of the shift  Outcome: Progressing  Goal: Verbalize understanding of personal risk factors for fall in the hospital  Outcome: Progressing  Goal: Verbalize understanding of risk factor reduction measures to prevent injury from fall in the home  Outcome: Progressing  Goal: Use assistive devices by end of the shift  Outcome: Progressing  Goal: Pace activities to prevent fatigue by end of the shift  Outcome: Progressing

## 2024-12-06 NOTE — PROGRESS NOTES
Stacey Heath  Age: 54 y.o.  MRN: [id number]  Date: 12/6/2024  Location of service: in community    Program Details  Medicaid Community Clinical Case Management  Status: Enrolled  Effective Dates: 10/17/2023 - present  Responsible Staff: NAREN Davidson      Goals Reviewed:  Problem: Access to Care Issue       Goal: Assess and Address Access Barriers       Priority: Medium        Problem: Anxiety       Goal: Maintain coping skills for continuous improvement       Priority: Medium        Problem: Financial Stressors       Goal: Assistance with financial concerns         Problem: Kidney Issues       Goal: Improve health to get on kidney transplant list       Priority: High        Problem: Medication Adherence       Goal: Adherence to Medication Regimen       Priority: High        Problem: Negative Experience, Conflict with, or Distrust of Providers and/or Health System       Goal: Plan to Address Patient Specific Negative Experience, Distrust, or Conflict with Providers and/or Health System       Priority: High        Problem: Risk of Uncoordinated Care       Goal: Care will be Coordinated and Supported by a Multidisciplinary Team of Providers       Priority: High          Summary:  This provider met with patient at [facility] where patient has been admitted for the last few days. This provider listened with empathy as patient talked about her meeting that she had with Dr. Barraza. This provider was able to print out and provide patient with the list of approved medications that Dr. Barraza had requested that patient received.                      NAREN Davidson

## 2024-12-06 NOTE — NURSING NOTE
2300 - bp 204/62, pt medicated with hydralazine 20mg ivpx1 dose.     Refill authorized per protocol.

## 2024-12-06 NOTE — TELEPHONE ENCOUNTER
Notified patient of committee decision to close evaluation due to medical comorbidities.  Discussed that she can return once she has her BP more controlled and her pulmonary status is improved.  She is aware she will receive a letter regarding evaluation.  Patient reports Dr. Barraza is taking over her care now.

## 2024-12-07 ENCOUNTER — APPOINTMENT (OUTPATIENT)
Dept: DIALYSIS | Facility: HOSPITAL | Age: 54
End: 2024-12-07
Payer: COMMERCIAL

## 2024-12-07 LAB
ALBUMIN SERPL BCP-MCNC: 3.8 G/DL (ref 3.4–5)
ANION GAP SERPL CALC-SCNC: 19 MMOL/L (ref 10–20)
BUN SERPL-MCNC: 53 MG/DL (ref 6–23)
CALCIUM SERPL-MCNC: 9.3 MG/DL (ref 8.6–10.6)
CHLORIDE SERPL-SCNC: 95 MMOL/L (ref 98–107)
CO2 SERPL-SCNC: 27 MMOL/L (ref 21–32)
CREAT SERPL-MCNC: 7.82 MG/DL (ref 0.5–1.05)
EGFRCR SERPLBLD CKD-EPI 2021: 6 ML/MIN/1.73M*2
ERYTHROCYTE [DISTWIDTH] IN BLOOD BY AUTOMATED COUNT: 17.5 % (ref 11.5–14.5)
GLUCOSE SERPL-MCNC: 69 MG/DL (ref 74–99)
HCT VFR BLD AUTO: 35.7 % (ref 36–46)
HGB BLD-MCNC: 10.7 G/DL (ref 12–16)
MAGNESIUM SERPL-MCNC: 2.5 MG/DL (ref 1.6–2.4)
MCH RBC QN AUTO: 29.6 PG (ref 26–34)
MCHC RBC AUTO-ENTMCNC: 30 G/DL (ref 32–36)
MCV RBC AUTO: 99 FL (ref 80–100)
NRBC BLD-RTO: 0 /100 WBCS (ref 0–0)
PHOSPHATE SERPL-MCNC: 7.9 MG/DL (ref 2.5–4.9)
PLATELET # BLD AUTO: 159 X10*3/UL (ref 150–450)
POTASSIUM SERPL-SCNC: 5.3 MMOL/L (ref 3.5–5.3)
RBC # BLD AUTO: 3.61 X10*6/UL (ref 4–5.2)
SODIUM SERPL-SCNC: 136 MMOL/L (ref 136–145)
WBC # BLD AUTO: 4.4 X10*3/UL (ref 4.4–11.3)

## 2024-12-07 PROCEDURE — 83735 ASSAY OF MAGNESIUM: CPT | Performed by: NURSE PRACTITIONER

## 2024-12-07 PROCEDURE — 2500000005 HC RX 250 GENERAL PHARMACY W/O HCPCS: Performed by: NURSE PRACTITIONER

## 2024-12-07 PROCEDURE — 80069 RENAL FUNCTION PANEL: CPT | Performed by: NURSE PRACTITIONER

## 2024-12-07 PROCEDURE — 1100000001 HC PRIVATE ROOM DAILY

## 2024-12-07 PROCEDURE — 2500000001 HC RX 250 WO HCPCS SELF ADMINISTERED DRUGS (ALT 637 FOR MEDICARE OP): Performed by: NURSE PRACTITIONER

## 2024-12-07 PROCEDURE — 36415 COLL VENOUS BLD VENIPUNCTURE: CPT | Performed by: NURSE PRACTITIONER

## 2024-12-07 PROCEDURE — 8010000001 HC DIALYSIS - HEMODIALYSIS PER DAY

## 2024-12-07 PROCEDURE — 85027 COMPLETE CBC AUTOMATED: CPT | Performed by: NURSE PRACTITIONER

## 2024-12-07 PROCEDURE — 2500000002 HC RX 250 W HCPCS SELF ADMINISTERED DRUGS (ALT 637 FOR MEDICARE OP, ALT 636 FOR OP/ED): Performed by: NURSE PRACTITIONER

## 2024-12-07 PROCEDURE — 94640 AIRWAY INHALATION TREATMENT: CPT

## 2024-12-07 RX ORDER — CLONIDINE HYDROCHLORIDE 0.1 MG/1
0.1 TABLET ORAL EVERY 6 HOURS PRN
Status: DISPENSED | OUTPATIENT
Start: 2024-12-07

## 2024-12-07 ASSESSMENT — COGNITIVE AND FUNCTIONAL STATUS - GENERAL
MOBILITY SCORE: 24
DAILY ACTIVITIY SCORE: 24

## 2024-12-07 ASSESSMENT — PAIN - FUNCTIONAL ASSESSMENT: PAIN_FUNCTIONAL_ASSESSMENT: 0-10

## 2024-12-07 ASSESSMENT — PAIN DESCRIPTION - LOCATION: LOCATION: GENERALIZED

## 2024-12-07 NOTE — NURSING NOTE
Report to Receiving RN:    Report To: called floor, no answer, smart text to nurse, PONCHO Chan  Report given to PONCHO John since pt going to new room from dialysis unit  Time Report Called: 1720  Hand-Off Communication: Pt completed 3.50 hrs HD, Pt clotted machine 20 mins before end time. Dr, notified, 2L fluid removed, pt stable, A/O. /80, HR 77.   Complications During Treatment: Yes  Ultrafiltration Treatment: Yes  Medications Administered During Dialysis: No  Blood Products Administered During Dialysis: No  Labs Sent During Dialysis: No  Heparin Drip Rate Changes: No  Dialysis Catheter Dressing: N/A, AVF  Last Dressing Change: N/A    Last Updated: 5:21 PM by SAKINA MOELLER

## 2024-12-07 NOTE — NURSING NOTE
Pt transferred via bed to Dialysis suite.  Medicated with Tylenol and Flexeril per patient request.

## 2024-12-07 NOTE — CARE PLAN
The patient's goals for the shift include      The clinical goals for the shift include pt will remain HDS this shift    Over the shift, the patient did not make progress toward the following goals. Barriers to progression include ***. Recommendations to address these barriers include ***.

## 2024-12-07 NOTE — PROGRESS NOTES
Medical Intensive Care Step down- Daily Progress Note   Subjective    Stacey Heath is a 54 y.o. year old female patient admitted on 12/3/2024 with following ICU needs: hypertensive urgency and pulmonary edema    Hospital course  Stacey Heath is a 54 y.o. female with PMH of  ESRD on dialysis (T/Th/Sat via RUE AVF), HTN, HFpEF, COPD (no o2), brachial DVT on Eliquis presented with a chief complaint of SOB 2/2 hypertensive emergency w/ flash PE.      In the ED: afebrile: , /97, RR 20, BIPAP 18/8 11L. She was placed on a nitro drip, given 80mg Lasix, and placed on BiPAP. CO2 down trended (57 - 49), she was taken off BiPAP at 245AM and started on Cardene and nitroglycerin. Transferred to MICU / inability to transition to long acting oral meds.     Upon arrival to the MICU she was on RA, weaned off the drips and was resumed on her home meds. Nephrology was consulted  She was transferred to Step down on .  Underwent HD on ,  and plan for .  Her Primary nephrologist Dr Barraza  reviewed all her BP meds as she was on multiple meds with concern for complience with duplicate doses and  meds.  Her final med list was reviewed and given to the patient.  Her final antiHTN regimen: Carvedilol 25 mg twice daily, Valsartan 320 mg daily, Nifedipine 90 mg daily, Clonidine 0.1 twice daily and PRN Clonidine 0.1 mg every 6 hrs for SBP >180 That list might still be adjusted prior to discharge by nephrology.    She is stable to transfer to medicine floor.     Interval History:  BP is stable. No events over night.     Meds    Scheduled medications  apixaban, 5 mg, oral, BID  aspirin, 81 mg, oral, Daily  atorvastatin, 80 mg, oral, Nightly  calcium acetate, 1,334 mg, oral, TID  carvedilol, 25 mg, oral, BID  cloNIDine, 0.1 mg, oral, BID  epoetin joceline-epbx, 10,000 Units, subcutaneous, Once per day on   fluticasone, 2 spray, Each Nostril, Daily  fluticasone furoate-vilanteroL, 1 puff,  "inhalation, Daily  gabapentin, 300 mg, oral, Nightly  NIFEdipine ER, 90 mg, oral, Nightly  oxygen, , inhalation, Continuous - Inhalation  pantoprazole, 40 mg, oral, Daily before breakfast  polyethylene glycol, 17 g, oral, Daily  valsartan, 320 mg, oral, q PM  vitamin B complex-vitamin C-folic acid, 1 capsule, oral, Daily      Continuous medications     PRN medications  PRN medications: acetaminophen, albuterol, albuterol, cloNIDine, cyclobenzaprine, dextrose, dextrose, glucagon, glucagon, hydrOXYzine HCL, ondansetron, SUMAtriptan     Objective    Blood pressure 178/65, pulse 74, temperature 36.5 °C (97.7 °F), resp. rate 15, height 1.397 m (4' 7\"), weight 56.5 kg (124 lb 9 oz), SpO2 98%.     Physical Exam:  Constitutional: pt in NAD, alert and cooperative  Eyes: PERRL, EOMI, no icterus   ENMT: mucous membranes moist, no apparent injury, no lesions seen  Head/Neck: Neck supple, no apparent injury  Respiratory/Thorax: Lungs CTA bilaterally, non-labored breathing, no cough, on RA  Cardiovascular: Regular, rate and rhythm  Gastrointestinal: Nondistended, soft, non-tender, BS present x 4  : voiding  Musculoskeletal: ROM intact, no joint swelling, normal strength  Extremities: normal extremities, no edema, right arm AVF +thrill, +bruit  Neurological: alert and oriented x 3, speech clear, follows commands, no focal deficits  Skin: Warm and dry, no lesions, no rashes       Intake/Output Summary (Last 24 hours) at 12/7/2024 0944  Last data filed at 12/6/2024 2000  Gross per 24 hour   Intake 1700 ml   Output 2400 ml   Net -700 ml     Labs:   Results from last 72 hours   Lab Units 12/07/24  0522 12/06/24  0407 12/05/24  0628   SODIUM mmol/L 136 135* 138   POTASSIUM mmol/L 5.3 5.1 4.7   CHLORIDE mmol/L 95* 95* 97*   CO2 mmol/L 27 30 26   BUN mg/dL 53* 21 54*   CREATININE mg/dL 7.82* 4.76* 8.91*   GLUCOSE mg/dL 69* 64* 66*   CALCIUM mg/dL 9.3 8.8 8.8   ANION GAP mmol/L 19 15 20   EGFR mL/min/1.73m*2 6* 10* 5*   PHOSPHORUS mg/dL " 7.9* 6.7* 7.8*      Results from last 72 hours   Lab Units 12/07/24  0522 12/06/24  0407 12/05/24  0628   WBC AUTO x10*3/uL 4.4 4.0* 4.8   HEMOGLOBIN g/dL 10.7* 10.2* 9.9*   HEMATOCRIT % 35.7* 33.7* 33.4*   PLATELETS AUTO x10*3/uL 159 153 167                   Micro/ID:     Lab Results   Component Value Date    URINECULTURE NO SIGNIFICANT GROWTH. 07/09/2023    BLOODCULT  08/21/2023     No Growth at 1 days~No Growth at 2 days~No Growth at 3 days~NO GROWTH at 4 days - FINAL REPORT       Summary of key imaging results    XR chest 1 view 12/3/2024    1. Prominence of the interstitial markings likely representing pulmonary edema, slightly improved compared to 11/26/2024.   2. Small left-greater-than-right pleural effusions and adjacent atelectasis.       Assessment and Plan     Assessment: Stacey Heath is a 54 y.o. year old female patient admitted on 12/3/2024 with hypertensive urgency and pulmonary edema    Restraints: no    Summary for 12/07/24  :  S/p HD on 12/5 and UF on 12/6.  Plan for HD today   Patient's BP meds were reviewed and adjusted with her primary nephrologist Dr Barraza. Her final antiHTN regimen: Carvedilol 25 mg twice daily, Valsartan 320 mg daily, Nifedipine 90 mg daily, Clonidine 0.1 twice daily and PRN Clonidine 0.1 mg every 6 hrs for SBP >180    Plan:  NEUROLOGY/PSYCH:  Dx:  hx peripheral neuropathy  Management:  Continue Gabapentin 300 mg HS  PRN tylenol for headache and home imitrex  Continue home flexeril   PRN atarax   PT/OT/OOB    CARDIOVASCULAR:  Dx:  hx HFpEF, HTN, admitted with hypertensive emergency   Home meds: Coreg 50 mg BID, Clonidine 0.2 mg/24 hr patch- 1 patch on skin every friday days, hydralazine 100 mg TID , Imdur 120 mg every day , Nifedipine 90 mg every day , Valsartan 320 mg every day   Required Cardene gtt and NTG gtt in the ICU, now off  TTE 04/2024- LVEF 60-65% with pseudonormal pattern of left ventricular diastolic filling.   Renal US 2023 --> Doppler evaluation compatible  with history of chronic renal failure on dialysis.     Management:  Reviewed all patient's BP meds with her primary nephrologist Dr Barraza. Appears she is non-compliant with her meds and has multiple  and double dosed meds.   Her final antiHTN regimen : Carvedilol 25 mg twice daily, Valsartan 320 mg daily, Nifedipine 90 mg daily, Clonidine 0.1 twice daily and PRN Clonidine 0.1 mg every 6 hrs for SBP >180    PULMONARY:  FiO2 (%):  [28 %] 28 %   Dx: AHRF 2/2 pulmonary edema, hx COPD w/o acute exacerbation   Weaned to RA (was up to 11L in ED/ICU)  Was on nocturnal BIPAP, now off  Management:  Diurese with home torsemide on non-HD days  Continue home Breo Ellipta 100-25 1 puff daily, albuterol q4     RENAL/GENITOURINARY:  Dx:  ESRD on HD (T,Th, Sat) via R AVF  home meds torsemide 40mg on non dialysis days, nephrocaps   Management:  Neprology following  S/p HD on  with 3L removed, US on   Plan for HD today     GASTROENTEROLOGY:  Dx:  hx GERD  Management:  Continue with home PPI  Renal diet    HEMATOLOGY:  Dx: normocytic anemia likely 2/2 renal dz, hx LUE DVT  per chart review appears that brachial vein DVT in setting of PICC --> provoked   Management:  Continue with home Eliquis (suspect can be discontinued as > 6 month duration of therapy has now been completed    INFECTIOUS DISEASE:  Dx: no s/sx infection   Tmax 36.1, WBC 4.4  Management:  No need for ABX    ICU Check List       FEN  Fluids: PRN  Electrolytes: PRN  Nutrition: renal diet  Prophylaxis:  DVT ppx: Eliquis  GI ppx: PPI  Bowel care: Miralax  Hardware:            Social:  Code: Full Code    HPOA: Daughter, Diya Perry, [phone]   Disposition: will transfer to medicine floor today         YONIS Jalloh   24 at 9:44 AM     Pt discussed with Dr. Slade seen and examined. All labs, VS and previous plan of care reviewed.     Disclaimer: Documentation completed with the information available at the time of input. The times in the  chart may not be reflective of actual patient care times, interventions, or procedures. Documentation occurs after the physical care of the patient.

## 2024-12-07 NOTE — CARE PLAN
Problem: Pain  Goal: Takes deep breaths with improved pain control throughout the shift  Outcome: Progressing  Goal: Turns in bed with improved pain control throughout the shift  Outcome: Progressing  Goal: Walks with improved pain control throughout the shift  Outcome: Progressing  Goal: Performs ADL's with improved pain control throughout shift  Outcome: Progressing     Problem: Fall/Injury  Goal: Not fall by end of shift  Outcome: Progressing  Goal: Be free from injury by end of the shift  Outcome: Progressing  Goal: Verbalize understanding of personal risk factors for fall in the hospital  Outcome: Progressing  Goal: Verbalize understanding of risk factor reduction measures to prevent injury from fall in the home  Outcome: Progressing  Goal: Use assistive devices by end of the shift  Outcome: Progressing  Goal: Pace activities to prevent fatigue by end of the shift  Outcome: Progressing     Problem: Skin  Goal: Promote/optimize nutrition  Flowsheets (Taken 12/7/2024 0209)  Promote/optimize nutrition:   Monitor/record intake including meals   Offer water/supplements/favorite foods  Goal: Promote skin healing  Flowsheets (Taken 12/7/2024 0222)  Promote skin healing:   Turn/reposition every 2 hours/use positioning/transfer devices   Rotate device position/do not position patient on device   Protective dressings over bony prominences   Ensure correct size (line/device) and apply per  instructions   Assess skin/pad under line(s)/device(s)   The patient's goals for the shift include      The clinical goals for the shift include PTWILL REMAIN HDS FOR THE SHIFT

## 2024-12-07 NOTE — CARE PLAN
Problem: Skin  Goal: Decreased wound size/increased tissue granulation at next dressing change  Outcome: Progressing  Flowsheets (Taken 12/7/2024 1343)  Decreased wound size/increased tissue granulation at next dressing change:   Promote sleep for wound healing   Utilize specialty bed per algorithm  Goal: Participates in plan/prevention/treatment measures  Outcome: Progressing  Flowsheets (Taken 12/7/2024 1343)  Participates in plan/prevention/treatment measures:   Discuss with provider PT/OT consult   Elevate heels   Increase activity/out of bed for meals  Goal: Prevent/manage excess moisture  Outcome: Progressing  Flowsheets (Taken 12/7/2024 1343)  Prevent/manage excess moisture:   Cleanse incontinence/protect with barrier cream   Monitor for/manage infection if present   Use wicking fabric (obtain order)   Moisturize dry skin  Goal: Prevent/minimize sheer/friction injuries  Outcome: Progressing  Flowsheets (Taken 12/7/2024 1343)  Prevent/minimize sheer/friction injuries:   Use pull sheet   Increase activity/out of bed for meals   HOB 30 degrees or less   Utilize specialty bed per algorithm  Goal: Promote/optimize nutrition  Outcome: Progressing  Flowsheets (Taken 12/7/2024 1343)  Promote/optimize nutrition:   Monitor/record intake including meals   Consume > 50% meals/supplements   Offer water/supplements/favorite foods  Goal: Promote skin healing  Outcome: Progressing  Flowsheets (Taken 12/7/2024 1343)  Promote skin healing:   Assess skin/pad under line(s)/device(s)   Rotate device position/do not position patient on device   Ensure correct size (line/device) and apply per  instructions     Problem: Pain  Goal: Takes deep breaths with improved pain control throughout the shift  Outcome: Progressing  Goal: Turns in bed with improved pain control throughout the shift  Outcome: Progressing  Goal: Walks with improved pain control throughout the shift  Outcome: Progressing  Goal: Performs ADL's with  improved pain control throughout shift  Outcome: Progressing  Goal: Participates in PT with improved pain control throughout the shift  Outcome: Progressing  Goal: Free from opioid side effects throughout the shift  Outcome: Progressing  Goal: Free from acute confusion related to pain meds throughout the shift  Outcome: Progressing     Problem: Fall/Injury  Goal: Not fall by end of shift  Outcome: Progressing  Goal: Be free from injury by end of the shift  Outcome: Progressing  Goal: Verbalize understanding of personal risk factors for fall in the hospital  Outcome: Progressing  Goal: Verbalize understanding of risk factor reduction measures to prevent injury from fall in the home  Outcome: Progressing  Goal: Use assistive devices by end of the shift  Outcome: Progressing  Goal: Pace activities to prevent fatigue by end of the shift  Outcome: Progressing   The patient's goals for the shift include      The clinical goals for the shift include Pt will remain HDS and tolerated Dialysis this shift.

## 2024-12-07 NOTE — PROGRESS NOTES
Stacey Heath   54 [email or web address]    @@  N/Room: 23767731/6015/6015-A    Subjective: no events    Objective:     Meds:   apixaban, 5 mg, BID  aspirin, 81 mg, Daily  atorvastatin, 80 mg, Nightly  calcium acetate, 1,334 mg, TID  carvedilol, 25 mg, BID  cloNIDine, 0.1 mg, BID  epoetin joceline-epbx, 10,000 Units, Once per day on Monday Wednesday Friday  fluticasone, 2 spray, Daily  fluticasone furoate-vilanteroL, 1 puff, Daily  gabapentin, 300 mg, Nightly  NIFEdipine ER, 90 mg, Nightly  oxygen, , Continuous - Inhalation  pantoprazole, 40 mg, Daily before breakfast  polyethylene glycol, 17 g, Daily  valsartan, 320 mg, q PM  vitamin B complex-vitamin C-folic acid, 1 capsule, Daily         acetaminophen, 650 mg, q6h PRN  albuterol, 1.25 mg, q6h PRN  albuterol, 2 puff, q4h PRN  cloNIDine, 0.1 mg, q6h PRN  cyclobenzaprine, 10 mg, TID PRN  dextrose, 12.5 g, q15 min PRN  dextrose, 25 g, q15 min PRN  glucagon, 1 mg, q15 min PRN  glucagon, 1 mg, q15 min PRN  hydrOXYzine HCL, 25 mg, q6h PRN  ondansetron, 4 mg, q6h PRN  SUMAtriptan, 25 mg, Once PRN        Vitals:    12/07/24 1245   BP:    Pulse: 69   Resp:    Temp: 36 °C (96.8 °F)   SpO2:           Intake/Output Summary (Last 24 hours) at 12/7/2024 1324  Last data filed at 12/7/2024 1300  Gross per 24 hour   Intake 2580 ml   Output 2400 ml   Net 180 ml       General appearance: AAOx3. No distress  Eyes: non-icteric  Skin: no apparent rash  Heart: S1 S2 regular  Lungs: CTA bilaterally with minimal crackles  Abdomen: soft, nt/nd  Extremities: No edema bilaterally  Neuro: No FND  ACCESS: RUE AVF     Blood Labs:  Results for orders placed or performed during the hospital encounter of 12/03/24 (from the past 24 hours)   CBC   Result Value Ref Range    WBC 4.4 4.4 - 11.3 x10*3/uL    nRBC 0.0 0.0 - 0.0 /100 WBCs    RBC 3.61 (L) 4.00 - 5.20 x10*6/uL    Hemoglobin 10.7 (L) 12.0 - 16.0 g/dL    Hematocrit 35.7 (L) 36.0 - 46.0 %    MCV 99 80 - 100 fL    MCH 29.6 26.0 - 34.0 pg    MCHC 30.0 (L) 32.0 -  36.0 g/dL    RDW 17.5 (H) 11.5 - 14.5 %    Platelets 159 150 - 450 x10*3/uL   Renal Function Panel   Result Value Ref Range    Glucose 69 (L) 74 - 99 mg/dL    Sodium 136 136 - 145 mmol/L    Potassium 5.3 3.5 - 5.3 mmol/L    Chloride 95 (L) 98 - 107 mmol/L    Bicarbonate 27 21 - 32 mmol/L    Anion Gap 19 10 - 20 mmol/L    Urea Nitrogen 53 (H) 6 - 23 mg/dL    Creatinine 7.82 (H) 0.50 - 1.05 mg/dL    eGFR 6 (L) >60 mL/min/1.73m*2    Calcium 9.3 8.6 - 10.6 mg/dL    Phosphorus 7.9 (H) 2.5 - 4.9 mg/dL    Albumin 3.8 3.4 - 5.0 g/dL   Magnesium   Result Value Ref Range    Magnesium 2.50 (H) 1.60 - 2.40 mg/dL     *Note: Due to a large number of results and/or encounters for the requested time period, some results have not been displayed. A complete set of results can be found in Results Review.              ASSESSMENT:  Stacey Heath is a 54 y.o. 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 She did not miss any treatment. Nephrology is consulted for ESKD Mx.      #ESKD on HD TTS  -HD unit: [facility]   -Primary nephrologist: Dr Barraza  -Schedule TTS  -Access: RUE AVF           RECOMMENDATIONS:  - Proceed with HD per schedule.  - Renal diet  - Renal MVI  - Keep MAP >65 or SBP >90  - Strict I/O monitoring, daily weights, daily BMP      Emil Bentio MD  Nephrology Fellow   Daytime / Weekend Renal Pager [phone]  After 7 pm Emergencies [phone] Pager [phone]

## 2024-12-07 NOTE — NURSING NOTE
Pt being transferred to [place] [address] room #6133.  Report called to Hazel HURST RN.  Pt is currently in Dialysis suite.  Informed receiving nurse, patient medicated for pain and cramping last at 1236 with Tylenol and Flexeril PO prior to leaving floor.

## 2024-12-07 NOTE — NURSING NOTE
Report from Sending RN:    Report From: Rocio  Recent Surgery of Procedure: No  Baseline Level of Consciousness (LOC): A and Ox  4  Oxygen Use: none  Type: RA   Diabetic: Yes  Last BP Med Given Day of Dialysis: 1 AM med  Last Pain Med Given: None  Lab Tests to be Obtained with Dialysis: No  Blood Transfusion to be Given During Dialysis: No  Available IV Access: Yes  Medications to be Administered During Dialysis: No  Continuous IV Infusion Running: No  Restraints on Currently or in the Last 24 Hours: No  Hand-Off Communication: Is being moved off Step down and is stable to come to dialysis. No tele required.   Dialysis Catheter Dressing: AVF  Last Dressing Change: AVF

## 2024-12-08 VITALS
RESPIRATION RATE: 17 BRPM | DIASTOLIC BLOOD PRESSURE: 91 MMHG | HEART RATE: 86 BPM | OXYGEN SATURATION: 98 % | TEMPERATURE: 97.5 F | SYSTOLIC BLOOD PRESSURE: 208 MMHG | WEIGHT: 124.56 LBS | BODY MASS INDEX: 28.83 KG/M2 | HEIGHT: 55 IN

## 2024-12-08 LAB
ALBUMIN SERPL BCP-MCNC: 3.9 G/DL (ref 3.4–5)
ANION GAP SERPL CALC-SCNC: 18 MMOL/L (ref 10–20)
BUN SERPL-MCNC: 44 MG/DL (ref 6–23)
CALCIUM SERPL-MCNC: 9.8 MG/DL (ref 8.6–10.6)
CHLORIDE SERPL-SCNC: 99 MMOL/L (ref 98–107)
CO2 SERPL-SCNC: 26 MMOL/L (ref 21–32)
CREAT SERPL-MCNC: 5.91 MG/DL (ref 0.5–1.05)
EGFRCR SERPLBLD CKD-EPI 2021: 8 ML/MIN/1.73M*2
ERYTHROCYTE [DISTWIDTH] IN BLOOD BY AUTOMATED COUNT: 17.5 % (ref 11.5–14.5)
GLUCOSE BLD MANUAL STRIP-MCNC: 154 MG/DL (ref 74–99)
GLUCOSE BLD MANUAL STRIP-MCNC: 86 MG/DL (ref 74–99)
GLUCOSE SERPL-MCNC: 81 MG/DL (ref 74–99)
HCT VFR BLD AUTO: 37.9 % (ref 36–46)
HGB BLD-MCNC: 11.4 G/DL (ref 12–16)
MAGNESIUM SERPL-MCNC: 2.4 MG/DL (ref 1.6–2.4)
MCH RBC QN AUTO: 29.6 PG (ref 26–34)
MCHC RBC AUTO-ENTMCNC: 30.1 G/DL (ref 32–36)
MCV RBC AUTO: 98 FL (ref 80–100)
NRBC BLD-RTO: 0 /100 WBCS (ref 0–0)
PHOSPHATE SERPL-MCNC: 4.9 MG/DL (ref 2.5–4.9)
PLATELET # BLD AUTO: 165 X10*3/UL (ref 150–450)
POTASSIUM SERPL-SCNC: 5 MMOL/L (ref 3.5–5.3)
RBC # BLD AUTO: 3.85 X10*6/UL (ref 4–5.2)
SODIUM SERPL-SCNC: 138 MMOL/L (ref 136–145)
WBC # BLD AUTO: 4.2 X10*3/UL (ref 4.4–11.3)

## 2024-12-08 PROCEDURE — 2500000004 HC RX 250 GENERAL PHARMACY W/ HCPCS (ALT 636 FOR OP/ED): Performed by: NURSE PRACTITIONER

## 2024-12-08 PROCEDURE — 2500000001 HC RX 250 WO HCPCS SELF ADMINISTERED DRUGS (ALT 637 FOR MEDICARE OP): Performed by: NURSE PRACTITIONER

## 2024-12-08 PROCEDURE — 84100 ASSAY OF PHOSPHORUS: CPT | Performed by: NURSE PRACTITIONER

## 2024-12-08 PROCEDURE — 94640 AIRWAY INHALATION TREATMENT: CPT

## 2024-12-08 PROCEDURE — 2500000001 HC RX 250 WO HCPCS SELF ADMINISTERED DRUGS (ALT 637 FOR MEDICARE OP): Performed by: INTERNAL MEDICINE

## 2024-12-08 PROCEDURE — 82947 ASSAY GLUCOSE BLOOD QUANT: CPT

## 2024-12-08 PROCEDURE — 1100000001 HC PRIVATE ROOM DAILY

## 2024-12-08 PROCEDURE — 2500000002 HC RX 250 W HCPCS SELF ADMINISTERED DRUGS (ALT 637 FOR MEDICARE OP, ALT 636 FOR OP/ED): Performed by: NURSE PRACTITIONER

## 2024-12-08 PROCEDURE — 85027 COMPLETE CBC AUTOMATED: CPT | Performed by: NURSE PRACTITIONER

## 2024-12-08 PROCEDURE — 36415 COLL VENOUS BLD VENIPUNCTURE: CPT | Performed by: NURSE PRACTITIONER

## 2024-12-08 PROCEDURE — 83735 ASSAY OF MAGNESIUM: CPT | Performed by: NURSE PRACTITIONER

## 2024-12-08 PROCEDURE — 99232 SBSQ HOSP IP/OBS MODERATE 35: CPT | Performed by: INTERNAL MEDICINE

## 2024-12-08 RX ORDER — CLONIDINE HYDROCHLORIDE 0.1 MG/1
0.1 TABLET ORAL NIGHTLY
Status: DISPENSED | OUTPATIENT
Start: 2024-12-08

## 2024-12-08 RX ORDER — CLONIDINE HYDROCHLORIDE 0.1 MG/1
0.1 TABLET ORAL EVERY 6 HOURS PRN
Status: DISCONTINUED | OUTPATIENT
Start: 2024-12-08 | End: 2024-12-08

## 2024-12-08 ASSESSMENT — PAIN SCALES - GENERAL: PAINLEVEL_OUTOF10: 0 - NO PAIN

## 2024-12-08 NOTE — PROGRESS NOTES
Pt for evaluation of hyperglycemic episode. Emergency Department Nursing Plan of Care       The Nursing Plan of Care is developed from the Nursing assessment and Emergency Department Attending provider initial evaluation. The plan of care may be reviewed in the ED Provider note.     The Plan of Care was developed with the following considerations:   Patient / Family readiness to learn indicated by:verbalized understanding  Persons(s) to be included in education: patient  Barriers to Learning/Limitations:      Signed     Tisha Mosley RN    8/6/2018   11:40 AM Stacey Heath is a 54 y.o. female on day 4 of admission presenting with Shortness of breath.      Subjective   Patient was seen and examined bedside this morning,, was watching a film on her tablet, stated that she had an eventful night, and looking forward tomorrow for dialysis prior to discharge.  Patient wishes to establish care with nephrology team at [facility] for her dialysis stating that it would be much more convenient, and will be easier to treat her if she gets sick.       Objective     Last Recorded Vitals  /75 (Patient Position: Lying)   Pulse 77   Temp 36.6 °C (97.9 °F)   Resp 18   Wt 56.5 kg (124 lb 9 oz)   SpO2 99%   Intake/Output last 3 Shifts:    Intake/Output Summary (Last 24 hours) at 12/8/2024 1428  Last data filed at 12/7/2024 1648  Gross per 24 hour   Intake 600 ml   Output 2470 ml   Net -1870 ml       Admission Weight  Weight: 60.8 kg (134 lb 0.6 oz) (12/04/24 0600)    Daily Weight  12/07/24 : 56.5 kg (124 lb 9 oz)    Image Results  XR chest 1 view  Narrative: Interpreted By:  Sammy Noyola and Beyersdorf Conner   STUDY:  XR CHEST 1 VIEW;  12/3/2024 9:39 pm      INDICATION:  Signs/Symptoms:New significant hypoxia.      COMPARISON:  Two-view chest 11/26/2024      ACCESSION NUMBER(S):  [id number]      ORDERING CLINICIAN:  ALVARO DONAHUE      FINDINGS:  AP radiograph of the chest was provided.      Similar position of the right subclavian vascular stent.      CARDIOMEDIASTINAL SILHOUETTE:  Cardiomediastinal silhouette is stable in size and configuration.      LUNGS:  Interval improved aeration of the lower lungs. Prominence of the  interstitial markings, improved compared to prior likely reflecting  pulmonary edema. Similar nodular density in the left midlung. Left  basilar atelectasis. Small left-greater-than-right pleural effusions  and adjacent atelectasis. No pneumothorax.      ABDOMEN:  No remarkable upper abdominal findings.      BONES:  No acute osseous changes.      Impression: 1.  Prominence of the interstitial markings likely representing  pulmonary edema, slightly improved compared to 11/26/2024.  2. Small left-greater-than-right pleural effusions and adjacent  atelectasis.      I personally reviewed the image(s)/study and resident interpretation.  I agree with the findings as stated by resident Greg Cheng.  Data analyzed and images interpreted at [facility], [place], [place].      MACRO:  None      Signed by: Sammy Noyola 12/3/2024 11:33 PM  Dictation workstation:   [id number]      Physical Exam  Constitutional: Alert active, cooperative not in acute distress  Eyes: PERRLA, clear sclera  ENMT: Moist mucosal membranes, no exudate  Head / Neck: Atraumatic, normocephalic, supple neck, JVP not visualized  Lungs: Patent airways, CTABL  Heart: RRR, S1S2, no murmurs appreciated, palpable pulses in all extremities  GI: Soft, NT, ND, bowel sounds present in all quadrants  MSK: Moves all extremities freely, no restriction  of ROM, no joint edema  Extremities: HD via R AVF, no peripheral edema  : No Nick catheter inserted  Breast: Deferred  Neurological: AAO x 3 to person, place and date, facial muscles symmetrical, sensation intact, strength 4/4, no acute focal neurological deficits appreciated  Psychological: Appropriate mood and behavior  Relevant Results               Assessment/Plan    Stacey Heath is a 54 y.o. year old female patient admitted on 12/3/2024 with following ICU needs: Cardene drip   She has a PMH significant for ESRD on dialysis (T/Th/Sat via RUE AVF), HTN, HFpEF, COPD (no o2), brachial DVT on Eliquis presented with a chief complaint of SOB 2/2 hypertensive emergency w/ flash PE.    In the ED: afebrile: , /97, RR 20, BIPAP 18/8 11L. She was placed on a nitro drip, given 80mg Lasix, and placed on BiPAP. CO2 down trended (57 - 49), she was taken off BiPAP at 245AM and started on Cardene and nitroglycerin. Transferred to MICU  2/2 inability to transition to long acting oral meds     As far as the trigger, she says she had dialysis on Tuesday and did not miss any of her BP meds. Unclear what the trigger was. She's had numerous admissions for the same thing. Admitted on  to the CICU and was discharged from hospital .     In the MICU this AM patient was very lethargic and only speaking 1-2 words before going back to sleep.     Hypertension: Presented in hypertensive emergency, requiring ICU admission for Cardizem drip and nitroglycerin drip drip.  -Status post ICU care now stable for regular nursing floor  - During ICU stay reviewed all patient's BP meds with her primary nephrologist Dr Barraza. Appears she is non-compliant with her meds and has multiple  and double dosed meds.   Her final antiHTN regimen : Carvedilol 25 mg twice daily, Valsartan 320 mg daily, Nifedipine 90 mg daily, (Clonidine 0.1 twice daily changed to 0.1 mg at bedtime) and PRN Clonidine 0.1 mg every 6 hrs for SBP >180     Chronic diastolic heart failure  -TTE 2024 shows LVEF 60 to 65% with pseudo normal pattern of ventricular diastolic failure  -On carvedilol 25 mg twice daily, Imdur 120 mg daily, hydralazine 100 mg 3 times daily  -Atorvastatin 80 mg daily     End-stage renal disease on hemodialysis.  -Nephrology consulted appreciate management of hemodialysis sessions  -Patient contemplating switching dialysis center to the main campus, and I explained to her that only acute care is done here and not outpatient hemodialysis, she was still wanting to talk to Dr. Barraza      Brachial DVT  -Eliquis 5 mg twice daily     COPD: Stable  -Continue Breo elliptica 1 inhalation daily  -Albuterol nebulizer every 6 hours as needed wheezing or shortness of breath     Diet  -Renal diet     DVT prophylaxis  -On Eliquis 5 mg twice daily     Disposition: Presented for hypertensive emergency, requiring ICU stay for Cardizem and nitroglycerin drip, now stable on  regular nursing floor, anticipated discharge tomorrow after dialysis if continued to remain stable.               Chris Alvarez, DO

## 2024-12-08 NOTE — NURSING NOTE
Received from hemodialysis into room 2069. Spoke with  phani Cm and staff aware patients needs her belongings.

## 2024-12-08 NOTE — PROGRESS NOTES
Patient tolerated IUF yesterday but became hypotensive, cramped at end of treatment.   250 ml given back  Overall 2 L taken off    Patient unsure of her weight post treatment    Will plan changing clonidine to 0.1 at bedtime, continue PRN clonidine as written  Plan 4 hour HD treatment tomorrow

## 2024-12-08 NOTE — PROGRESS NOTES
Renal Staff HD Note--Late Note    Patient seen, examined on dialysis   Tolerating treatment but hypotensive despite cool dialysate to 36   3L over 4 hour 2K     BP Readings from Last 3 Encounters:   12/07/24 (!) 193/65   12/01/24 (!) 215/71   11/27/24 (!) 196/85     [unfilled]  Lab Results   Component Value Date    CREATININE 7.82 (H) 12/07/2024    BUN 53 (H) 12/07/2024     12/07/2024    K 5.3 12/07/2024    CL 95 (L) 12/07/2024    CO2 27 12/07/2024     Lab Results   Component Value Date    PTH 1,415.0 (H) 05/31/2024    CALCIUM 9.3 12/07/2024    CAION 1.18 03/14/2023    PHOS 7.9 (H) 12/07/2024     @  Lab Results   Component Value Date    HGB 10.7 (L) 12/07/2024       Continue treatment per submitted orders   Decrease fluid goal to 2.5 L  Decrease dialysis tempt to 35.5  UF profile 2    Would hold off on discharge until   (1) patient closer to DW of 51 kg and able to teach back medications and dosages of anti-hypertensives  (2) patient on a stable antihypertensive regimen    Please have nursing staff review medications, pills with patient when passing meds and reinforce teaching / teach back  Please have clinical pharmacist review medication dosing and titration and need for consistency with patient

## 2024-12-08 NOTE — NURSING NOTE
Called to place PIV.  Able to get good blood return but upon flushing IV blew.  Patient has very tiny and sclerotic veins.  History of having alt.  access in past. Bedside nurse aware.  Ashley Castro RN  '

## 2024-12-08 NOTE — TREATMENT PLAN
"Patient was seen and examined at bedside post HD session stated that home hemodialysis was notable for episode of low BP, because she took her medication prior to dialysis, she otherwise denies having any symptoms or review of systems.    BP (!) 193/65   Pulse 83   Temp 36.6 °C (97.9 °F)   Resp 20   Ht 1.397 m (4' 7\")   Wt 56.5 kg (124 lb 9 oz)   SpO2 94%   BMI 28.95 kg/m²     Physical exam  Constitutional: Alert active, cooperative not in acute distress  Eyes: PERRLA, clear sclera  ENMT: Moist mucosal membranes, no exudate  Head / Neck: Atraumatic, normocephalic, supple neck, JVP not visualized  Lungs: Patent airways, CTABL  Heart: RRR, S1S2, no murmurs appreciated, palpable pulses in all extremities  GI: Soft, NT, ND, bowel sounds present in all quadrants  MSK: Moves all extremities freely, no restriction  of ROM, no joint edema  Extremities: HD via R AVF, no peripheral edema  : No Nick catheter inserted  Breast: Deferred  Neurological: AAO x 3 to person, place and date, facial muscles symmetrical, sensation intact, strength 4/4, no acute focal neurological deficits appreciated  Psychological: Appropriate mood and behavior    A/P    Stacey Heath is a 54 y.o. year old female patient admitted on 12/3/2024 with following ICU needs: Cardene drip   She has a PMH significant for ESRD on dialysis (T/Th/Sat via RUE AVF), HTN, HFpEF, COPD (no o2), brachial DVT on Eliquis presented with a chief complaint of SOB 2/2 hypertensive emergency w/ flash PE.    In the ED: afebrile: , /97, RR 20, BIPAP 18/8 11L. She was placed on a nitro drip, given 80mg Lasix, and placed on BiPAP. CO2 down trended (57 - 49), she was taken off BiPAP at 245AM and started on Cardene and nitroglycerin. Transferred to MICU 2/2 inability to transition to long acting oral meds     As far as the trigger, she says she had dialysis on Tuesday and did not miss any of her BP meds. Unclear what the trigger was. She's had numerous " admissions for the same thing. Admitted on  to the CICU and was discharged from hospital .     In the MICU this AM patient was very lethargic and only speaking 1-2 words before going back to sleep.    Hypertension: Presented in hypertensive emergency, requiring ICU admission for Cardizem drip and nitroglycerin drip drip.  -Status post ICU care now stable for regular nursing floor  - During ICU stay reviewed all patient's BP meds with her primary nephrologist Dr Barraza. Appears she is non-compliant with her meds and has multiple  and double dosed meds.   Her final antiHTN regimen : Carvedilol 25 mg twice daily, Valsartan 320 mg daily, Nifedipine 90 mg daily, Clonidine 0.1 twice daily and PRN Clonidine 0.1 mg every 6 hrs for SBP >180    Chronic diastolic heart failure  -TTE 2024 shows LVEF 60 to 65% with pseudo normal pattern of ventricular diastolic failure  -On carvedilol 25 mg twice daily, Imdur 120 mg daily, hydralazine 100 mg 3 times daily  -Atorvastatin 80 mg daily    End-stage renal disease on hemodialysis.  -Nephrology consulted appreciate management of hemodialysis sessions    Brachial DVT  -Eliquis 5 mg twice daily    COPD: Stable  -Continue Breo elliptica 1 inhalation daily  -Albuterol nebulizer every 6 hours as needed wheezing or shortness of breath    Diet  -Renal diet    DVT prophylaxis  -On Eliquis 5 mg twice daily      Disposition: Presented for hypertensive emergency, requiring ICU stay for Cardizem and nitroglycerin drip, now stable on regular nursing floor, anticipated discharge tomorrow if continued to remain stable

## 2024-12-08 NOTE — CARE PLAN
The patient's goals for the shift include      The clinical goals for the shift include Patient will remain HDS during shift    Over the shift, the patient did not make progress toward the following goals. Barriers to progression include acute disease process. Recommendations to address these barriers include acute disease process.

## 2024-12-09 ENCOUNTER — APPOINTMENT (OUTPATIENT)
Dept: DIALYSIS | Facility: HOSPITAL | Age: 54
End: 2024-12-09
Payer: COMMERCIAL

## 2024-12-09 ENCOUNTER — APPOINTMENT (OUTPATIENT)
Dept: GASTROENTEROLOGY | Facility: HOSPITAL | Age: 54
End: 2024-12-09
Payer: COMMERCIAL

## 2024-12-09 LAB
ALBUMIN SERPL BCP-MCNC: 3.9 G/DL (ref 3.4–5)
ANION GAP SERPL CALC-SCNC: 22 MMOL/L (ref 10–20)
BUN SERPL-MCNC: 71 MG/DL (ref 6–23)
CALCIUM SERPL-MCNC: 9.9 MG/DL (ref 8.6–10.6)
CHLORIDE SERPL-SCNC: 97 MMOL/L (ref 98–107)
CO2 SERPL-SCNC: 23 MMOL/L (ref 21–32)
CREAT SERPL-MCNC: 8.72 MG/DL (ref 0.5–1.05)
EGFRCR SERPLBLD CKD-EPI 2021: 5 ML/MIN/1.73M*2
ERYTHROCYTE [DISTWIDTH] IN BLOOD BY AUTOMATED COUNT: 17.2 % (ref 11.5–14.5)
GLUCOSE BLD MANUAL STRIP-MCNC: 137 MG/DL (ref 74–99)
GLUCOSE SERPL-MCNC: 104 MG/DL (ref 74–99)
HCT VFR BLD AUTO: 35.6 % (ref 36–46)
HGB BLD-MCNC: 10.7 G/DL (ref 12–16)
MAGNESIUM SERPL-MCNC: 2.53 MG/DL (ref 1.6–2.4)
MCH RBC QN AUTO: 29.7 PG (ref 26–34)
MCHC RBC AUTO-ENTMCNC: 30.1 G/DL (ref 32–36)
MCV RBC AUTO: 99 FL (ref 80–100)
NRBC BLD-RTO: 0 /100 WBCS (ref 0–0)
PHOSPHATE SERPL-MCNC: 5.3 MG/DL (ref 2.5–4.9)
PLATELET # BLD AUTO: 177 X10*3/UL (ref 150–450)
POTASSIUM SERPL-SCNC: 5.9 MMOL/L (ref 3.5–5.3)
RBC # BLD AUTO: 3.6 X10*6/UL (ref 4–5.2)
SODIUM SERPL-SCNC: 136 MMOL/L (ref 136–145)
WBC # BLD AUTO: 4.8 X10*3/UL (ref 4.4–11.3)

## 2024-12-09 PROCEDURE — 83735 ASSAY OF MAGNESIUM: CPT | Performed by: NURSE PRACTITIONER

## 2024-12-09 PROCEDURE — 8010000001 HC DIALYSIS - HEMODIALYSIS PER DAY

## 2024-12-09 PROCEDURE — 99233 SBSQ HOSP IP/OBS HIGH 50: CPT | Performed by: INTERNAL MEDICINE

## 2024-12-09 PROCEDURE — 2500000001 HC RX 250 WO HCPCS SELF ADMINISTERED DRUGS (ALT 637 FOR MEDICARE OP): Performed by: NURSE PRACTITIONER

## 2024-12-09 PROCEDURE — 80069 RENAL FUNCTION PANEL: CPT | Performed by: NURSE PRACTITIONER

## 2024-12-09 PROCEDURE — 90935 HEMODIALYSIS ONE EVALUATION: CPT | Performed by: INTERNAL MEDICINE

## 2024-12-09 PROCEDURE — 2500000001 HC RX 250 WO HCPCS SELF ADMINISTERED DRUGS (ALT 637 FOR MEDICARE OP): Performed by: INTERNAL MEDICINE

## 2024-12-09 PROCEDURE — 82947 ASSAY GLUCOSE BLOOD QUANT: CPT

## 2024-12-09 PROCEDURE — 85027 COMPLETE CBC AUTOMATED: CPT | Performed by: NURSE PRACTITIONER

## 2024-12-09 PROCEDURE — 36415 COLL VENOUS BLD VENIPUNCTURE: CPT | Performed by: NURSE PRACTITIONER

## 2024-12-09 PROCEDURE — 1100000001 HC PRIVATE ROOM DAILY

## 2024-12-09 PROCEDURE — 2500000002 HC RX 250 W HCPCS SELF ADMINISTERED DRUGS (ALT 637 FOR MEDICARE OP, ALT 636 FOR OP/ED): Performed by: NURSE PRACTITIONER

## 2024-12-09 ASSESSMENT — PAIN SCALES - GENERAL
PAINLEVEL_OUTOF10: 0 - NO PAIN
PAINLEVEL_OUTOF10: 0 - NO PAIN
PAINLEVEL_OUTOF10: 7
PAINLEVEL_OUTOF10: 0 - NO PAIN

## 2024-12-09 ASSESSMENT — PAIN - FUNCTIONAL ASSESSMENT
PAIN_FUNCTIONAL_ASSESSMENT: 0-10
PAIN_FUNCTIONAL_ASSESSMENT: NO/DENIES PAIN
PAIN_FUNCTIONAL_ASSESSMENT: UNABLE TO SELF-REPORT

## 2024-12-09 ASSESSMENT — PAIN DESCRIPTION - LOCATION: LOCATION: ABDOMEN

## 2024-12-09 ASSESSMENT — PAIN DESCRIPTION - ORIENTATION: ORIENTATION: LEFT

## 2024-12-09 NOTE — PROGRESS NOTES
Renal Staff HD Note    Patient seen, examined on dialysis   Tolerating treatment without event  155/74 59.2 kg  3L as alexis over 4 hours    BP Readings from Last 3 Encounters:   12/09/24 175/79   12/01/24 (!) 215/71   11/27/24 (!) 196/85     [unfilled]  Lab Results   Component Value Date    CREATININE 8.72 (H) 12/09/2024    BUN 71 (H) 12/09/2024     12/09/2024    K 5.9 (H) 12/09/2024    CL 97 (L) 12/09/2024    CO2 23 12/09/2024     Lab Results   Component Value Date    PTH 1,415.0 (H) 05/31/2024    CALCIUM 9.9 12/09/2024    CAION 1.18 03/14/2023    PHOS 5.3 (H) 12/09/2024     @  Lab Results   Component Value Date    HGB 10.7 (L) 12/09/2024       Continue treatment per submitted orders

## 2024-12-09 NOTE — PROGRESS NOTES
Physical Therapy                 Therapy Communication Note    Patient Name: Stacey Heath  MRN: [id number]  Department: [facility]  Room: 2069/2069-A  Today's Date: 12/9/2024     Discipline: Physical Therapy    Missed Visit Reason: Missed Visit Reason:  (Pt off floor at HD, will re-attempt as time permits.)    Missed Time: Attempt    12/09/24 at 11:54 AM   Lazara Grijalva PTA   Rehab Office: [phone]

## 2024-12-09 NOTE — PROGRESS NOTES
Stacey Heath is a 54 y.o. female on day 5 of admission presenting with Shortness of breath.      Subjective   Patient was seen and examined bedside this morning, reports feeling SOB overnight and was placed on 2 L of oxygen. Currently with crackles on evaluation       Objective     Last Recorded Vitals  /79 (BP Location: Left arm, Patient Position: Lying)   Pulse 83   Temp 36.2 °C (97.2 °F) (Temporal)   Resp 18   Wt 56.5 kg (124 lb 9 oz)   SpO2 94%   Intake/Output last 3 Shifts:    Intake/Output Summary (Last 24 hours) at 12/9/2024 1056  Last data filed at 12/9/2024 0900  Gross per 24 hour   Intake 810 ml   Output --   Net 810 ml       Admission Weight  Weight: 60.8 kg (134 lb 0.6 oz) (12/04/24 0600)    Daily Weight  12/07/24 : 56.5 kg (124 lb 9 oz)    Image Results  XR chest 1 view  Narrative: Interpreted By:  Sammy Noyola and Beyersdorf Conner   STUDY:  XR CHEST 1 VIEW;  12/3/2024 9:39 pm      INDICATION:  Signs/Symptoms:New significant hypoxia.      COMPARISON:  Two-view chest 11/26/2024      ACCESSION NUMBER(S):  [id number]      ORDERING CLINICIAN:  ALVARO DONAHUE      FINDINGS:  AP radiograph of the chest was provided.      Similar position of the right subclavian vascular stent.      CARDIOMEDIASTINAL SILHOUETTE:  Cardiomediastinal silhouette is stable in size and configuration.      LUNGS:  Interval improved aeration of the lower lungs. Prominence of the  interstitial markings, improved compared to prior likely reflecting  pulmonary edema. Similar nodular density in the left midlung. Left  basilar atelectasis. Small left-greater-than-right pleural effusions  and adjacent atelectasis. No pneumothorax.      ABDOMEN:  No remarkable upper abdominal findings.      BONES:  No acute osseous changes.      Impression: 1. Prominence of the interstitial markings likely representing  pulmonary edema, slightly improved compared to 11/26/2024.  2. Small left-greater-than-right pleural effusions and  adjacent  atelectasis.      I personally reviewed the image(s)/study and resident interpretation.  I agree with the findings as stated by resident Greg Cheng.  Data analyzed and images interpreted at [facility], [place], [place].      MACRO:  None      Signed by: Sammy Noyola 12/3/2024 11:33 PM  Dictation workstation:   [id number]      Physical Exam  Constitutional: Alert active, cooperative not in acute distress  Eyes: PERRLA, clear sclera  ENMT: Moist mucosal membranes, no exudate  Head / Neck: Atraumatic, normocephalic, supple neck, JVP not visualized  Lungs: Patent airways, good air entry however crackles heard at the bases   Heart: RRR, S1S2, no murmurs appreciated, palpable pulses in all extremities  GI: Soft, NT, ND, bowel sounds present in all quadrants  MSK: Moves all extremities freely, no restriction  of ROM, no joint edema  Extremities: HD via R AVF, no peripheral edema  : No Nick catheter inserted  Breast: Deferred  Neurological: AAO x 3 to person, place and date, facial muscles symmetrical, sensation intact, strength 4/4, no acute focal neurological deficits appreciated  Psychological: Appropriate mood and behavior  Relevant Results      Assessment/Plan    Stacey Heath is a 54 y.o. year old female patient admitted on 12/3/2024 with following ICU needs: Cardene drip  She has a PMH significant for ESRD on dialysis (T/Th/Sat via RUE AVF), HTN, HFpEF, COPD (no o2), brachial DVT on Eliquis presented with a chief complaint of SOB 2/2 hypertensive emergency w/ flash PE.   In the ED: afebrile: , /97, RR 20, BIPAP 18/8 11L. She was placed on a nitro drip, given 80mg Lasix, and placed on BiPAP. CO2 down trended (57 - 49), she was taken off BiPAP at 245AM and started on Cardene and nitroglycerin. Transferred to MICU 2/2 inability to transition to long acting oral meds. As far as the trigger, she says she had dialysis on Tuesday and did not miss any of  her BP meds. Unclear what the trigger was. She's had numerous admissions for the same thing. Admitted on Nov 9 to the CICU and was discharged from hospital Nov 20. In the MICU this AM patient was very lethargic and only speaking 1-2 words before going back to sleep.       # Uncontrolled hypertension   Hypertension: Presented in hypertensive emergency, requiring ICU admission for Cardizem drip and nitroglycerin drip drip.  Carvedilol 25 mg twice daily, Valsartan 320 mg daily, Nifedipine 90 mg daily, (Clonidine 0.1 twice daily changed to 0.1 mg at bedtime) and PRN Clonidine 0.1 mg every 6 hrs for SBP >180     #Chronic diastolic heart failure  -TTE April 2024 shows LVEF 60 to 65% with pseudo normal pattern of ventricular diastolic failure  -On carvedilol 25 mg twice daily, Imdur 120 mg daily, hydralazine 100 mg 3 times daily  -Atorvastatin 80 mg dailt       #End-stage renal disease on hemodialysis.  -Nephrology consulted appreciate management of hemodialysis sessions  -Patient asking again today about switching dialysis center to the main campus, and I explained to her that only acute care is done here and not outpatient hemodialysis, she was still wanting to talk to Dr. Barraza      #Brachial DVT  -Eliquis 5 mg twice daily     #COPD: Stable  -Continue Breo elliptica 1 inhalation daily  -Albuterol nebulizer every 6 hours as needed wheezing or shortness of breath     Diet  -Renal diet     #DVT prophylaxis  -On Eliquis 5 mg twice daily        Possible DC after dialysis depending on resp status. Needs ambulatory pulse ox as well      Katerin Mejia MD

## 2024-12-09 NOTE — NURSING NOTE
Report from Sending RN:    Report From: Cindy ( PONCHO)  Recent Surgery of Procedure: No  Baseline Level of Consciousness (LOC): a/o x 4  Oxygen Use: yes, 2 liters  Type: nc  Diabetic: No  Last BP Med Given Day of Dialysis: yes, see mar  Last Pain Med Given: none  Lab Tests to be Obtained with Dialysis: No  Blood Transfusion to be Given During Dialysis: No  Available IV Access: None  Medications to be Administered During Dialysis: No  Continuous IV Infusion Running: No  Restraints on Currently or in the Last 24 Hours: No  Hand-Off Communication: No acute overnight or morning events; vss; Pt did take morning medications; Pt will not need labs; Pt is a full code. Cassidy Gonzalez RN.  Dialysis Catheter Dressing: AVF right upper arm  Last Dressing Change: yes

## 2024-12-09 NOTE — NURSING NOTE
.Report to Receiving RN:    Report To: PONCHO White  Time Report Called: 1130  Hand-Off Communication: Pt tolerated HD well with no issue. Fluid remove 2.5 Liter Post /74 HR 78  Complications During Treatment: Pt feel BP is dropping UF off  Ultrafiltration Treatment: No  Medications Administered During Dialysis: No  Blood Products Administered During Dialysis: No  Labs Sent During Dialysis: No  Heparin Drip Rate Changes: No  Dialysis Catheter Dressing: AVF  Last Dressing Change: N/A

## 2024-12-09 NOTE — CARE PLAN
The patient's goals for the shift include      The clinical goals for the shift include Pt will remain hemodynamically stable during this shift      Problem: Skin  Goal: Decreased wound size/increased tissue granulation at next dressing change  Outcome: Progressing  Goal: Participates in plan/prevention/treatment measures  Outcome: Progressing  Goal: Prevent/manage excess moisture  Outcome: Progressing  Goal: Prevent/minimize sheer/friction injuries  Outcome: Progressing  Goal: Promote/optimize nutrition  Outcome: Progressing  Goal: Promote skin healing  Outcome: Progressing     Problem: Pain  Goal: Takes deep breaths with improved pain control throughout the shift  Outcome: Progressing  Goal: Turns in bed with improved pain control throughout the shift  Outcome: Progressing  Goal: Walks with improved pain control throughout the shift  Outcome: Progressing  Goal: Performs ADL's with improved pain control throughout shift  Outcome: Progressing  Goal: Participates in PT with improved pain control throughout the shift  Outcome: Progressing  Goal: Free from opioid side effects throughout the shift  Outcome: Progressing  Goal: Free from acute confusion related to pain meds throughout the shift  Outcome: Progressing     Problem: Fall/Injury  Goal: Not fall by end of shift  Outcome: Progressing  Goal: Be free from injury by end of the shift  Outcome: Progressing  Goal: Verbalize understanding of personal risk factors for fall in the hospital  Outcome: Progressing  Goal: Verbalize understanding of risk factor reduction measures to prevent injury from fall in the home  Outcome: Progressing  Goal: Use assistive devices by end of the shift  Outcome: Progressing  Goal: Pace activities to prevent fatigue by end of the shift  Outcome: Progressing     Problem: Pain - Adult  Goal: Verbalizes/displays adequate comfort level or baseline comfort level  Outcome: Progressing     Problem: Safety - Adult  Goal: Free from fall  injury  Outcome: Progressing     Problem: Discharge Planning  Goal: Discharge to home or other facility with appropriate resources  Outcome: Progressing     Problem: Chronic Conditions and Co-morbidities  Goal: Patient's chronic conditions and co-morbidity symptoms are monitored and maintained or improved  Outcome: Progressing     Problem: Diabetes  Goal: Achieve decreasing blood glucose levels by end of shift  Outcome: Progressing  Goal: Increase stability of blood glucose readings by end of shift  Outcome: Progressing  Goal: Decrease in ketones present in urine by end of shift  Outcome: Progressing  Goal: Maintain electrolyte levels within acceptable range throughout shift  Outcome: Progressing  Goal: Maintain glucose levels >70mg/dl to <250mg/dl throughout shift  Outcome: Progressing  Goal: No changes in neurological exam by end of shift  Outcome: Progressing  Goal: Learn about and adhere to nutrition recommendations by end of shift  Outcome: Progressing  Goal: Vital signs within normal range for age by end of shift  Outcome: Progressing  Goal: Increase self care and/or family involovement by end of shift  Outcome: Progressing  Goal: Receive DSME education by end of shift  Outcome: Progressing

## 2024-12-09 NOTE — PROGRESS NOTES
Stacey Heath  Age: 54 y.o.  MRN: [id number]  Date: 12/4/2024  Location of service: in community    Program Details  Medicaid Community Clinical Case Management  Status: Enrolled  Effective Dates: 10/17/2023 - present  Responsible Staff: NAREN Davidson      Goals Reviewed:  Problem: Risk of Uncoordinated Care       Goal: Care will be Coordinated and Supported by a Multidisciplinary Team of Providers       Priority: High          Summary:  This writer met with patient in the hospital for a community visit.   This writer discusses upcoming plans with patient of bringing more people onto her team. Patient states she has already been notified and is agreeable to it.   Patient states she is looking forward to having more assistance available to her.   This writer engages in relationship building with the patient.     Appointment start time: 1305  Appointment completion time: 1421  Total time spent with patient (in minutes): 76  Non-Billable Time: 76  Billable Time Total: 0    Roberta Gallego RN

## 2024-12-09 NOTE — CARE PLAN
The patient's goals for the shift include      The clinical goals for the shift include Patient will complete HD this shift

## 2024-12-10 ENCOUNTER — APPOINTMENT (OUTPATIENT)
Dept: DIALYSIS | Facility: HOSPITAL | Age: 54
End: 2024-12-10
Payer: COMMERCIAL

## 2024-12-10 ENCOUNTER — APPOINTMENT (OUTPATIENT)
Dept: CARDIOLOGY | Facility: CLINIC | Age: 54
End: 2024-12-10
Payer: COMMERCIAL

## 2024-12-10 LAB
ALBUMIN SERPL BCP-MCNC: 3.7 G/DL (ref 3.4–5)
ANION GAP SERPL CALC-SCNC: 16 MMOL/L (ref 10–20)
BUN SERPL-MCNC: 33 MG/DL (ref 6–23)
CALCIUM SERPL-MCNC: 9.4 MG/DL (ref 8.6–10.6)
CHLORIDE SERPL-SCNC: 95 MMOL/L (ref 98–107)
CO2 SERPL-SCNC: 30 MMOL/L (ref 21–32)
CREAT SERPL-MCNC: 5.41 MG/DL (ref 0.5–1.05)
EGFRCR SERPLBLD CKD-EPI 2021: 9 ML/MIN/1.73M*2
ERYTHROCYTE [DISTWIDTH] IN BLOOD BY AUTOMATED COUNT: 17.2 % (ref 11.5–14.5)
GLUCOSE BLD MANUAL STRIP-MCNC: 152 MG/DL (ref 74–99)
GLUCOSE BLD MANUAL STRIP-MCNC: 162 MG/DL (ref 74–99)
GLUCOSE BLD MANUAL STRIP-MCNC: 97 MG/DL (ref 74–99)
GLUCOSE SERPL-MCNC: 211 MG/DL (ref 74–99)
HCT VFR BLD AUTO: 35.7 % (ref 36–46)
HGB BLD-MCNC: 10.8 G/DL (ref 12–16)
MAGNESIUM SERPL-MCNC: 2.27 MG/DL (ref 1.6–2.4)
MCH RBC QN AUTO: 29.5 PG (ref 26–34)
MCHC RBC AUTO-ENTMCNC: 30.3 G/DL (ref 32–36)
MCV RBC AUTO: 98 FL (ref 80–100)
NRBC BLD-RTO: 0 /100 WBCS (ref 0–0)
PHOSPHATE SERPL-MCNC: 5.1 MG/DL (ref 2.5–4.9)
PLATELET # BLD AUTO: 188 X10*3/UL (ref 150–450)
POTASSIUM SERPL-SCNC: 5 MMOL/L (ref 3.5–5.3)
RBC # BLD AUTO: 3.66 X10*6/UL (ref 4–5.2)
SODIUM SERPL-SCNC: 136 MMOL/L (ref 136–145)
WBC # BLD AUTO: 4 X10*3/UL (ref 4.4–11.3)

## 2024-12-10 PROCEDURE — 82947 ASSAY GLUCOSE BLOOD QUANT: CPT

## 2024-12-10 PROCEDURE — 2500000001 HC RX 250 WO HCPCS SELF ADMINISTERED DRUGS (ALT 637 FOR MEDICARE OP): Performed by: NURSE PRACTITIONER

## 2024-12-10 PROCEDURE — 2500000002 HC RX 250 W HCPCS SELF ADMINISTERED DRUGS (ALT 637 FOR MEDICARE OP, ALT 636 FOR OP/ED): Performed by: NURSE PRACTITIONER

## 2024-12-10 PROCEDURE — 1100000001 HC PRIVATE ROOM DAILY

## 2024-12-10 PROCEDURE — 6350000001 HC RX 635 EPOETIN >10,000 UNITS: Mod: JW

## 2024-12-10 PROCEDURE — 2500000001 HC RX 250 WO HCPCS SELF ADMINISTERED DRUGS (ALT 637 FOR MEDICARE OP): Performed by: INTERNAL MEDICINE

## 2024-12-10 PROCEDURE — 2500000001 HC RX 250 WO HCPCS SELF ADMINISTERED DRUGS (ALT 637 FOR MEDICARE OP): Performed by: STUDENT IN AN ORGANIZED HEALTH CARE EDUCATION/TRAINING PROGRAM

## 2024-12-10 PROCEDURE — 80069 RENAL FUNCTION PANEL: CPT | Performed by: NURSE PRACTITIONER

## 2024-12-10 PROCEDURE — RXMED WILLOW AMBULATORY MEDICATION CHARGE

## 2024-12-10 PROCEDURE — 85027 COMPLETE CBC AUTOMATED: CPT | Performed by: NURSE PRACTITIONER

## 2024-12-10 PROCEDURE — 83735 ASSAY OF MAGNESIUM: CPT | Performed by: NURSE PRACTITIONER

## 2024-12-10 PROCEDURE — 6350000001 HC RX 635 EPOETIN >10,000 UNITS: Mod: JW | Performed by: NURSE PRACTITIONER

## 2024-12-10 PROCEDURE — 99232 SBSQ HOSP IP/OBS MODERATE 35: CPT

## 2024-12-10 PROCEDURE — 36600 WITHDRAWAL OF ARTERIAL BLOOD: CPT | Performed by: NURSE PRACTITIONER

## 2024-12-10 PROCEDURE — 2500000005 HC RX 250 GENERAL PHARMACY W/O HCPCS

## 2024-12-10 RX ORDER — NIFEDIPINE 90 MG/1
90 TABLET, EXTENDED RELEASE ORAL NIGHTLY
Qty: 30 TABLET | Refills: 0 | Status: SHIPPED | OUTPATIENT
Start: 2024-12-10 | End: 2025-01-11

## 2024-12-10 RX ORDER — HYDRALAZINE HYDROCHLORIDE 100 MG/1
100 TABLET, FILM COATED ORAL 3 TIMES DAILY
Qty: 90 TABLET | Refills: 0 | Status: SHIPPED | OUTPATIENT
Start: 2024-12-10 | End: 2024-12-12 | Stop reason: HOSPADM

## 2024-12-10 RX ORDER — LIDOCAINE 560 MG/1
1 PATCH PERCUTANEOUS; TOPICAL; TRANSDERMAL DAILY
Status: DISCONTINUED | OUTPATIENT
Start: 2024-12-10 | End: 2024-12-12 | Stop reason: HOSPADM

## 2024-12-10 RX ORDER — LIDOCAINE 560 MG/1
1 PATCH PERCUTANEOUS; TOPICAL; TRANSDERMAL DAILY
Qty: 15 PATCH | Refills: 0 | Status: SHIPPED | OUTPATIENT
Start: 2024-12-10 | End: 2025-01-09

## 2024-12-10 RX ORDER — ACETAMINOPHEN 325 MG/1
650 TABLET ORAL EVERY 6 HOURS PRN
Qty: 30 TABLET | Refills: 0 | Status: SHIPPED | OUTPATIENT
Start: 2024-12-10

## 2024-12-10 RX ORDER — DICLOFENAC SODIUM 10 MG/G
4 GEL TOPICAL 4 TIMES DAILY PRN
Qty: 400 G | Refills: 0 | Status: SHIPPED | OUTPATIENT
Start: 2024-12-10 | End: 2025-01-09

## 2024-12-10 RX ORDER — NIFEDIPINE 90 MG/1
90 TABLET, EXTENDED RELEASE ORAL
Qty: 30 TABLET | Refills: 0 | Status: SHIPPED | OUTPATIENT
Start: 2024-12-10 | End: 2024-12-10 | Stop reason: ENTERED-IN-ERROR

## 2024-12-10 RX ORDER — PANTOPRAZOLE SODIUM 40 MG/1
40 TABLET, DELAYED RELEASE ORAL AS NEEDED
Qty: 30 TABLET | Refills: 0 | Status: SHIPPED | OUTPATIENT
Start: 2024-12-10

## 2024-12-10 RX ORDER — DICLOFENAC SODIUM 10 MG/G
4 GEL TOPICAL 4 TIMES DAILY PRN
Status: DISCONTINUED | OUTPATIENT
Start: 2024-12-10 | End: 2024-12-12 | Stop reason: HOSPADM

## 2024-12-10 RX ORDER — CLONIDINE 0.2 MG/24H
PATCH, EXTENDED RELEASE TRANSDERMAL
Qty: 5 PATCH | Refills: 1 | Status: SHIPPED | OUTPATIENT
Start: 2024-12-10 | End: 2024-12-10 | Stop reason: HOSPADM

## 2024-12-10 RX ORDER — ISOSORBIDE MONONITRATE 120 MG/1
120 TABLET, EXTENDED RELEASE ORAL DAILY
Qty: 30 TABLET | Refills: 0 | Status: SHIPPED | OUTPATIENT
Start: 2024-12-10 | End: 2024-12-12 | Stop reason: HOSPADM

## 2024-12-10 RX ORDER — POLYETHYLENE GLYCOL 3350 17 G/17G
17 POWDER, FOR SOLUTION ORAL DAILY
Qty: 238 G | Refills: 0 | Status: SHIPPED | OUTPATIENT
Start: 2024-12-10 | End: 2025-01-09

## 2024-12-10 RX ORDER — HYDRALAZINE HYDROCHLORIDE 25 MG/1
50 TABLET, FILM COATED ORAL ONCE
Status: COMPLETED | OUTPATIENT
Start: 2024-12-10 | End: 2024-12-10

## 2024-12-10 RX ORDER — DOCUSATE SODIUM 100 MG/1
100 CAPSULE, LIQUID FILLED ORAL 2 TIMES DAILY PRN
Qty: 30 CAPSULE | Refills: 0 | Status: SHIPPED | OUTPATIENT
Start: 2024-12-10 | End: 2025-01-09

## 2024-12-10 RX ORDER — CARVEDILOL 25 MG/1
25 TABLET ORAL 2 TIMES DAILY
Qty: 60 TABLET | Refills: 0 | Status: SHIPPED | OUTPATIENT
Start: 2024-12-10 | End: 2025-01-09

## 2024-12-10 ASSESSMENT — PAIN DESCRIPTION - DESCRIPTORS: DESCRIPTORS: CRAMPING

## 2024-12-10 ASSESSMENT — PAIN SCALES - GENERAL
PAINLEVEL_OUTOF10: 3
PAINLEVEL_OUTOF10: 0 - NO PAIN
PAINLEVEL_OUTOF10: 8
PAINLEVEL_OUTOF10: 3
PAINLEVEL_OUTOF10: 0 - NO PAIN

## 2024-12-10 ASSESSMENT — PAIN - FUNCTIONAL ASSESSMENT
PAIN_FUNCTIONAL_ASSESSMENT: NO/DENIES PAIN
PAIN_FUNCTIONAL_ASSESSMENT: 0-10

## 2024-12-10 NOTE — NURSING NOTE
.Report from Sending RN:    Report From: PONCHO Melo  Recent Surgery of Procedure: No  Baseline Level of Consciousness (LOC): A&O X3  Oxygen Use: Yes  Type: 2L NC  Diabetic: No  Last BP Med Given Day of Dialysis: Hydralazine @0100  Last Pain Med Given: none  Lab Tests to be Obtained with Dialysis: No  Blood Transfusion to be Given During Dialysis: Yes  Available IV Access: no  Medications to be Administered During Dialysis: No  Continuous IV Infusion Running: No  Restraints on Currently or in the Last 24 Hours: No  Hand-Off Communication: Pt had no acute event overnight, vital sign stable. Not on precaution, no morning medication given. Travel by bed  Dialysis Catheter Dressing: AVF  Last Dressing Change: N/A

## 2024-12-10 NOTE — CARE PLAN
"  Aye Montanez presented for a  Medicare AWV and comprehensive Health Risk Assessment today. The following components were reviewed and updated:    Medical history  Family History  Social history  Allergies and Current Medications  Health Risk Assessment  Health Maintenance  Care Team         ** See Completed Assessments for Annual Wellness Visit within the encounter summary.**         The following assessments were completed:  Living Situation  CAGE  Depression Screening  Timed Get Up and Go  Whisper Test  Cognitive Function Screening  Nutrition Screening  ADL Screening  PAQ Screening        Vitals:    03/31/23 0939   BP: 125/77   Pulse: 63   Weight: 97.5 kg (215 lb)   Height: 5' 7" (1.702 m)     Body mass index is 33.67 kg/m².  Physical Exam          Diagnoses and health risks identified today and associated recommendations/orders:    1. Encounter for preventive health examination  Awv completed    2. Age-related osteoporosis without current pathological fracture  Chronic and stable. Continue current treatment. Follow with PCP.  Added fosamax      3. Current mild episode of major depressive disorder without prior episode  Chronic and stable. Continue current treatment. Follow with PCP.      4. History of CVA (cerebrovascular accident) S/P TPA  Chronic and stable. Continue current treatment. Follow with PCP.      5. Cervical radiculopathy  Chronic and stable. Continue current treatment. Follow with PCP.  Patient on chronic opioids.   Risk factors reviewed for any potential opioid use disorder   Pain evaluated during visit.  Current treatment plan documented.  Will refer to specialist, as appropriate.        6. Early dry stage nonexudative age-related macular degeneration of both eyes  Chronic and stable. Continue current treatment. Follow with PCP.  Seeing ophthalmology      7. Atherosclerosis of aorta  Chronic and stable. Continue current treatment. Follow with PCP.      8. Coronary artery disease, s/p stenting " The patient's goals for the shift include      The clinical goals for the shift include Pt will remain HDS      Problem: Skin  Goal: Decreased wound size/increased tissue granulation at next dressing change  Outcome: Progressing  Goal: Participates in plan/prevention/treatment measures  Outcome: Progressing  Goal: Prevent/manage excess moisture  Outcome: Progressing  Goal: Prevent/minimize sheer/friction injuries  Outcome: Progressing  Goal: Promote/optimize nutrition  Outcome: Progressing  Goal: Promote skin healing  Outcome: Progressing     Problem: Pain  Goal: Takes deep breaths with improved pain control throughout the shift  Outcome: Progressing  Goal: Turns in bed with improved pain control throughout the shift  Outcome: Progressing  Goal: Walks with improved pain control throughout the shift  Outcome: Progressing  Goal: Performs ADL's with improved pain control throughout shift  Outcome: Progressing  Goal: Participates in PT with improved pain control throughout the shift  Outcome: Progressing  Goal: Free from opioid side effects throughout the shift  Outcome: Progressing  Goal: Free from acute confusion related to pain meds throughout the shift  Outcome: Progressing     Problem: Fall/Injury  Goal: Not fall by end of shift  Outcome: Progressing  Goal: Be free from injury by end of the shift  Outcome: Progressing  Goal: Verbalize understanding of personal risk factors for fall in the hospital  Outcome: Progressing  Goal: Verbalize understanding of risk factor reduction measures to prevent injury from fall in the home  Outcome: Progressing  Goal: Use assistive devices by end of the shift  Outcome: Progressing  Goal: Pace activities to prevent fatigue by end of the shift  Outcome: Progressing     Problem: Pain - Adult  Goal: Verbalizes/displays adequate comfort level or baseline comfort level  Outcome: Progressing     Problem: Safety - Adult  Goal: Free from fall injury  Outcome: Progressing     Problem:  Discharge Planning  Goal: Discharge to home or other facility with appropriate resources  Outcome: Progressing     Problem: Chronic Conditions and Co-morbidities  Goal: Patient's chronic conditions and co-morbidity symptoms are monitored and maintained or improved  Outcome: Progressing     Problem: Diabetes  Goal: Achieve decreasing blood glucose levels by end of shift  Outcome: Progressing  Goal: Increase stability of blood glucose readings by end of shift  Outcome: Progressing  Goal: Decrease in ketones present in urine by end of shift  Outcome: Progressing  Goal: Maintain electrolyte levels within acceptable range throughout shift  Outcome: Progressing  Goal: Maintain glucose levels >70mg/dl to <250mg/dl throughout shift  Outcome: Progressing  Goal: No changes in neurological exam by end of shift  Outcome: Progressing  Goal: Learn about and adhere to nutrition recommendations by end of shift  Outcome: Progressing  Goal: Vital signs within normal range for age by end of shift  Outcome: Progressing  Goal: Increase self care and/or family involovement by end of shift  Outcome: Progressing  Goal: Receive DSME education by end of shift  Outcome: Progressing      LAD x 2 10/2017  Chronic and stable. Continue current treatment. Follow with PCP.      9. Hypercholesterolemia  Chronic and stable. Continue current treatment. Follow with PCP.      10. Raynaud's disease without gangrene  Chronic and stable. Continue current treatment. Follow with PCP.  Seeing rheumatology    11. Other systemic lupus erythematosus with lung involvement  Chronic and stable. Continue current treatment. Follow with PCP and rheumatology      12. Sjogren's syndrome without extraglandular involvement  Chronic and stable. Continue current treatment. Follow with PCP.      13. Acquired hypothyroidism  Chronic and stable. Continue current treatment. Follow with PCP.  On meds      14. Class 1 obesity due to excess calories with serious comorbidity and body mass index (BMI) of 33.0 to 33.9 in adult  Chronic and stable. Continue current treatment. Follow with PCP.      15. Gastroesophageal reflux disease without esophagitis  Chronic and stable. Continue current treatment. Follow with PCP.        Provided Aye with a 5-10 year written screening schedule and personal prevention plan. Recommendations were developed using the USPSTF age appropriate recommendations. Education, counseling, and referrals were provided as needed. After Visit Summary printed and given to patient which includes a list of additional screenings\tests needed.    Fu in 1 yr for Darryl Winter NP  I offered to discuss advanced care planning, including how to pick a person who would make decisions for you if you were unable to make them for yourself, called a health care power of , and what kind of decisions you might make such as use of life sustaining treatments such as ventilators and tube feeding when faced with a life limiting illness recorded on a living will that they will need to know. (How you want to be cared for as you near the end of your natural life)     X  Patient has advanced directives on file,  which we reviewed, and they do not wish to make changes.

## 2024-12-10 NOTE — NURSING NOTE
Report to Receiving RN:    Report To: Cindy (RN)  Time Report Called: 1137 am  Hand-Off Communication: post vs - 121/67, HR 77; fluid removed 2 liters  Complications During Treatment: No  Ultrafiltration Treatment: No  Medications Administered During Dialysis: Yes, Epogen 8000 units  Blood Products Administered During Dialysis: No  Labs Sent During Dialysis: No  Heparin Drip Rate Changes: No  Dialysis Catheter Dressing: right arm fistula  Last Dressing Change: yes    Electronic Signatures:   (Signed )   Authored:    (Signed )   Authored:     Last Updated: 1:05 PM by FRANCHESCA KUMAR

## 2024-12-10 NOTE — PROGRESS NOTES
Stacey Heath is a 54 y.o. female on day 6 of admission presenting with Shortness of breath.      Subjective   Patient was seen and examined bedside this morning,. No sob, on ra. No cp, n/v/ap. Reports L lower back/flank pain for  few days, no dysuria, doesn't radiate down her leg/ cause weakness or numbness.       Objective     Last Recorded Vitals  /78 (BP Location: Left arm, Patient Position: Sitting)   Pulse 77   Temp 36.1 °C (97 °F) (Tympanic)   Resp 18   Wt 56.8 kg (125 lb 3.5 oz)   SpO2 98%   Intake/Output last 3 Shifts:    Intake/Output Summary (Last 24 hours) at 12/10/2024 1416  Last data filed at 12/10/2024 1204  Gross per 24 hour   Intake 2320 ml   Output 2977 ml   Net -657 ml       Admission Weight  Weight: 60.8 kg (134 lb 0.6 oz) (12/04/24 0600)    Daily Weight  12/10/24 : 56.8 kg (125 lb 3.5 oz)    Image Results  ECG 12 lead  Normal sinus rhythm  Left ventricular hypertrophy with repolarization abnormality  Abnormal ECG  When compared with ECG of 03-DEC-2024 21:28,  Vent. rate has decreased BY  39 BPM  T wave inversion now evident in Anterior leads      Physical Exam  General: well-appearing, NAD, in dialysis  HEENT: NC/AT  Cardiac: rrr, no m/r/g.  Dialysis via RUE fistula   Lungs: normal work of breathing, CTAB  Abdomen: soft, non-tender, non-distended, BS present  Neuro: grossly intact  MSK: Paraspinal Lumbar tenderness. No peripheral edema, cyanosis, or pallor  Psych: no depression, anxiety        Assessment/Plan    Stacey Heath is a 54 y.o. year old female patient admitted on 12/3/2024 with following ICU needs: Cardene drip  She has a PMH significant for ESRD on dialysis (T/Th/Sat via RUE AVF), HTN, HFpEF, COPD (no o2), brachial DVT on Eliquis presented with a chief complaint of SOB 2/2 hypertensive emergency w/ flash PE. Treated in MICU then on floor once improved for optimal BP titration.    # Uncontrolled hypertension   Hypertension: Presented in hypertensive emergency, requiring  ICU admission for Cardizem drip and nitroglycerin drip drip.  Carvedilol 25 mg twice daily, Valsartan 320 mg daily, Nifedipine 90 mg daily, (Clonidine 0.1 twice daily changed to 0.1 mg at bedtime) and PRN Clonidine 0.1 mg every 6 hrs for SBP >180  - Blood pressure improving, will monitor BP and calculate PO clonidine requirement for discharge  [ ] Walking pulse ox planned for today 12/10    #L flank/back pain  :: likely muscular, no evidence of UTI/Neuro sx  - tylenol/ lidocaine patch/ diclofenac gel  - Will benefit from PT on discharge     #Chronic diastolic heart failure  -TTE April 2024 shows LVEF 60 to 65% with pseudo normal pattern of ventricular diastolic failure  -On carvedilol 25 mg twice daily, Imdur 120 mg daily, hydralazine 100 mg 3 times daily  -Atorvastatin 80 mg dailt       #End-stage renal disease on hemodialysis.  -Nephrology consulted appreciate management of hemodialysis sessions  -Patient asking again today about switching dialysis center to the main campus, and I explained to her that only acute care is done here and not outpatient hemodialysis, she was still wanting to talk to Dr. Barraza      #Brachial DVT  -Eliquis 5 mg twice daily     #COPD: Stable  -Continue Breo elliptica 1 inhalation daily  -Albuterol nebulizer every 6 hours as needed wheezing or shortness of breath     Diet  -Renal diet     #DVT prophylaxis  -On Eliquis 5 mg twice daily       Dispo: DC 12/11 with home care (ordered)    Patient seen and discussed with attending physician Dr. Mathews    Plan preliminary until cosigned by attending physician.    Jayla Otero M.D.  Family Medicine  PGY-2

## 2024-12-10 NOTE — PROGRESS NOTES
Per medical team, ADOD today. Medical team aware will need [facility] for [facility] PT for [facility] to follow up.

## 2024-12-11 ENCOUNTER — APPOINTMENT (OUTPATIENT)
Dept: DIALYSIS | Facility: HOSPITAL | Age: 54
End: 2024-12-11
Payer: COMMERCIAL

## 2024-12-11 ENCOUNTER — DOCUMENTATION (OUTPATIENT)
Dept: BEHAVIORAL HEALTH | Facility: CLINIC | Age: 54
End: 2024-12-11
Payer: COMMERCIAL

## 2024-12-11 LAB
ALBUMIN SERPL BCP-MCNC: 4.1 G/DL (ref 3.4–5)
ANION GAP SERPL CALC-SCNC: 22 MMOL/L (ref 10–20)
BUN SERPL-MCNC: 62 MG/DL (ref 6–23)
CALCIUM SERPL-MCNC: 10.1 MG/DL (ref 8.6–10.6)
CHLORIDE SERPL-SCNC: 95 MMOL/L (ref 98–107)
CO2 SERPL-SCNC: 27 MMOL/L (ref 21–32)
CREAT SERPL-MCNC: 7.81 MG/DL (ref 0.5–1.05)
EGFRCR SERPLBLD CKD-EPI 2021: 6 ML/MIN/1.73M*2
ERYTHROCYTE [DISTWIDTH] IN BLOOD BY AUTOMATED COUNT: 17.2 % (ref 11.5–14.5)
GLUCOSE BLD MANUAL STRIP-MCNC: 109 MG/DL (ref 74–99)
GLUCOSE BLD MANUAL STRIP-MCNC: 133 MG/DL (ref 74–99)
GLUCOSE BLD MANUAL STRIP-MCNC: 89 MG/DL (ref 74–99)
GLUCOSE SERPL-MCNC: 101 MG/DL (ref 74–99)
HCT VFR BLD AUTO: 40.6 % (ref 36–46)
HGB BLD-MCNC: 12.1 G/DL (ref 12–16)
MAGNESIUM SERPL-MCNC: 2.63 MG/DL (ref 1.6–2.4)
MCH RBC QN AUTO: 29.1 PG (ref 26–34)
MCHC RBC AUTO-ENTMCNC: 29.8 G/DL (ref 32–36)
MCV RBC AUTO: 98 FL (ref 80–100)
NRBC BLD-RTO: 0 /100 WBCS (ref 0–0)
PHOSPHATE SERPL-MCNC: 6.4 MG/DL (ref 2.5–4.9)
PLATELET # BLD AUTO: 204 X10*3/UL (ref 150–450)
POTASSIUM SERPL-SCNC: 5.5 MMOL/L (ref 3.5–5.3)
RBC # BLD AUTO: 4.16 X10*6/UL (ref 4–5.2)
SODIUM SERPL-SCNC: 138 MMOL/L (ref 136–145)
WBC # BLD AUTO: 4.6 X10*3/UL (ref 4.4–11.3)

## 2024-12-11 PROCEDURE — 8010000001 HC DIALYSIS - HEMODIALYSIS PER DAY

## 2024-12-11 PROCEDURE — 36415 COLL VENOUS BLD VENIPUNCTURE: CPT | Performed by: NURSE PRACTITIONER

## 2024-12-11 PROCEDURE — 97530 THERAPEUTIC ACTIVITIES: CPT | Mod: GP

## 2024-12-11 PROCEDURE — 2500000005 HC RX 250 GENERAL PHARMACY W/O HCPCS: Performed by: NURSE PRACTITIONER

## 2024-12-11 PROCEDURE — 2500000005 HC RX 250 GENERAL PHARMACY W/O HCPCS

## 2024-12-11 PROCEDURE — 97116 GAIT TRAINING THERAPY: CPT | Mod: GP

## 2024-12-11 PROCEDURE — 2500000002 HC RX 250 W HCPCS SELF ADMINISTERED DRUGS (ALT 637 FOR MEDICARE OP, ALT 636 FOR OP/ED): Performed by: NURSE PRACTITIONER

## 2024-12-11 PROCEDURE — 2500000001 HC RX 250 WO HCPCS SELF ADMINISTERED DRUGS (ALT 637 FOR MEDICARE OP): Performed by: NURSE PRACTITIONER

## 2024-12-11 PROCEDURE — 1100000001 HC PRIVATE ROOM DAILY

## 2024-12-11 PROCEDURE — 99231 SBSQ HOSP IP/OBS SF/LOW 25: CPT

## 2024-12-11 PROCEDURE — 80069 RENAL FUNCTION PANEL: CPT | Performed by: NURSE PRACTITIONER

## 2024-12-11 PROCEDURE — 85027 COMPLETE CBC AUTOMATED: CPT | Performed by: NURSE PRACTITIONER

## 2024-12-11 PROCEDURE — 83735 ASSAY OF MAGNESIUM: CPT | Performed by: NURSE PRACTITIONER

## 2024-12-11 PROCEDURE — 2500000001 HC RX 250 WO HCPCS SELF ADMINISTERED DRUGS (ALT 637 FOR MEDICARE OP)

## 2024-12-11 PROCEDURE — 2500000001 HC RX 250 WO HCPCS SELF ADMINISTERED DRUGS (ALT 637 FOR MEDICARE OP): Performed by: INTERNAL MEDICINE

## 2024-12-11 PROCEDURE — 94640 AIRWAY INHALATION TREATMENT: CPT

## 2024-12-11 PROCEDURE — 90935 HEMODIALYSIS ONE EVALUATION: CPT | Performed by: INTERNAL MEDICINE

## 2024-12-11 PROCEDURE — 82947 ASSAY GLUCOSE BLOOD QUANT: CPT

## 2024-12-11 ASSESSMENT — PAIN SCALES - GENERAL
PAINLEVEL_OUTOF10: 0 - NO PAIN
PAINLEVEL_OUTOF10: 0 - NO PAIN
PAINLEVEL_OUTOF10: 2

## 2024-12-11 ASSESSMENT — COGNITIVE AND FUNCTIONAL STATUS - GENERAL
CLIMB 3 TO 5 STEPS WITH RAILING: A LITTLE
MOBILITY SCORE: 22
WALKING IN HOSPITAL ROOM: A LITTLE

## 2024-12-11 ASSESSMENT — PAIN - FUNCTIONAL ASSESSMENT
PAIN_FUNCTIONAL_ASSESSMENT: 0-10
PAIN_FUNCTIONAL_ASSESSMENT: NO/DENIES PAIN

## 2024-12-11 NOTE — PROGRESS NOTES
Stacey Heath is a 54 y.o. female on day 7 of admission presenting with Shortness of breath.      Subjective   Patient was seen and examined bedside this morning,. No sob, on ra. No cp, n/v/ap. Persistent L lower back/flank pain.       Objective     Last Recorded Vitals  /67   Pulse 77   Temp 36.3 °C (97.3 °F)   Resp 18   Wt 56.6 kg (124 lb 12.5 oz)   SpO2 95%   Intake/Output last 3 Shifts:    Intake/Output Summary (Last 24 hours) at 12/11/2024 0902  Last data filed at 12/10/2024 1204  Gross per 24 hour   Intake 400 ml   Output --   Net 400 ml       Admission Weight  Weight: 60.8 kg (134 lb 0.6 oz) (12/04/24 0600)    Daily Weight  12/11/24 : 56.6 kg (124 lb 12.5 oz)    Image Results  ECG 12 lead  Normal sinus rhythm  Left ventricular hypertrophy with repolarization abnormality  Abnormal ECG  When compared with ECG of 03-DEC-2024 21:28,  Vent. rate has decreased BY  39 BPM  T wave inversion now evident in Anterior leads      Physical Exam  General: well-appearing, NAD, in dialysis  HEENT: NC/AT  Cardiac: rrr, flow murmur heard.  Dialysis via RUE fistula   Lungs: normal work of breathing, CTAB  Abdomen: non-distended  Neuro: grossly intact  MSK: No peripheral edema, cyanosis, or pallor  Psych: no depression, anxiety        Assessment/Plan    Stacey Heath is a 54 y.o. year old female patient admitted on 12/3/2024 with following ICU needs: Cardene drip  She has a PMH significant for ESRD on dialysis (T/Th/Sat via RUE AVF), HTN, HFpEF, COPD (no o2), brachial DVT on Eliquis presented with a chief complaint of SOB 2/2 hypertensive emergency w/ flash PE. Treated in MICU then on floor once improved for optimal BP titration.    # Uncontrolled hypertension   Hypertension: Presented in hypertensive emergency, requiring ICU admission for Cardizem drip and nitroglycerin drip drip.  Carvedilol 25 mg twice daily, Valsartan 320 mg daily, Nifedipine 90 mg daily, (Clonidine 0.1 twice daily changed to 0.1 mg at bedtime)  and PRN Clonidine 0.1 mg every 6 hrs for SBP >180  - Blood pressure improving, will monitor BP and calculate PO clonidine requirement for discharge - required 1 dose prn 12/9  [ ] Walking pulse ox planned for today 12/10  [ ] Nephrology would like to continue titrating patient meds prior to discharge    #L flank/back pain  :: likely muscular, no evidence of UTI/Neuro sx  - tylenol/ lidocaine patch/ diclofenac gel  - Will benefit from PT on discharge     #Chronic diastolic heart failure  -TTE April 2024 shows LVEF 60 to 65% with pseudo normal pattern of ventricular diastolic failure  -On carvedilol 25 mg twice daily, Imdur 120 mg daily, hydralazine 100 mg 3 times daily  -Atorvastatin 80 mg dailt       #End-stage renal disease on hemodialysis.  -Nephrology consulted appreciate management of hemodialysis sessions  -Patient asking again today about switching dialysis center to the main campus, and I explained to her that only acute care is done here and not outpatient hemodialysis, she was still wanting to talk to Dr. Barraza      #Brachial DVT  -Eliquis 5 mg twice daily     #COPD: Stable  -Continue Breo elliptica 1 inhalation daily  -Albuterol nebulizer every 6 hours as needed wheezing or shortness of breath     Diet  -Renal diet     #DVT prophylaxis  -On Eliquis 5 mg twice daily       Dispo: DC 12/12 with home care (ordered)    Patient seen and discussed with attending physician Dr. Mathews    Plan preliminary until cosigned by attending physician.    Jayla Otero M.D.  Family Medicine  PGY-2

## 2024-12-11 NOTE — DOCUMENTATION CLARIFICATION NOTE
"    PATIENT:               EMILEE JACKSON  ACCT #:                  [id number]  MRN:                       [id number]  :                       1970  ADMIT DATE:       12/3/2024 9:12 PM  DISCH DATE:  RESPONDING PROVIDER #:        [id number]          PROVIDER RESPONSE TEXT:    Acute Hypoxemic Respiratory Failure    CDI QUERY TEXT:    Clarification        Instruction:    Based on your assessment of the patient and the clinical information, please provide the requested documentation by clicking on the appropriate radio button and enter any additional information if prompted.    Question: Is there a diagnosis indicative of the clinical information    When answering this query, please exercise your independent professional judgment. The fact that a question is being asked, does not imply that any particular answer is desired or expected.    The patient's clinical indicators include:  Clinical Information: From ED provider Note- \"54 y.o. female past medical history of hypertension hyperlipidemia COPD on room air HFpEF with most recent EF of 50%, ESRD on  dialysis who presents today as a critical activation from triage for shortness of breath.  Patient was 82% with increased respiratory rate on room air with limited improvement on 3 L nasal cannula.  Patient is markedly tachypneic able to nod yes or no to questions, only able to answer yes or no occasionally\"    Clinical Indicators:    From ED provider note-  \"Pulmonary:  Effort: Tachypnea, accessory muscle usage and respiratory distress present.  Breath sounds: No stridor. Examination of the right-upper field reveals rales. Examination of the left-upper field reveals rales. Examination of the right-middle field reveals rales. Examination of the left-middle field reveals rales. Examination of the right-lower field reveals rales. Examination of the left-lower field reveals rales. Rales present. No wheezing or rhonchi.    From DPN on 24-  \"Dx: " "AHRF 2/2 pulmonary edema, hx COPD w/o acute exacerbation  Weaned to RA (was up to 11L in ED/ICU)  BIPAP at night, although poor tolerance\"    Treatment: Bipap, NC, Lasix    Risk Factors: HTN Emergency, Flash Pulmonary Edema, COPD  Options provided:  -- Acute Hypoxemic Respiratory Failure  -- Acute Hypercapnic Respiratory Failure  -- Acute Hypoxemic and Hypercapnic Respiratory Failure  -- No acute respiratory failure  -- Other - I will add my own diagnosis  -- Refer to Clinical Documentation Reviewer    Query created by: Jaelyn Samaniego on 12/5/2024 2:41 PM      Electronically signed by:  LOLY SOMERS 12/11/2024 9:03 AM          "

## 2024-12-11 NOTE — NURSING NOTE
.Report from Sending RN:    Report From: PONCHO Moreno  Recent Surgery of Procedure: No  Baseline Level of Consciousness (LOC): A&O X3  Oxygen Use: yes  Type: 2L NC  Diabetic: No  Last BP Med Given Day of Dialysis: coreg  Last Pain Med Given: None  Lab Tests to be Obtained with Dialysis: No  Blood Transfusion to be Given During Dialysis: No  Available IV Access: No  Medications to be Administered During Dialysis: No  Continuous IV Infusion Running: No  Restraints on Currently or in the Last 24 Hours: No  Hand-Off Communication: Pt had no acute event this morning, vital signs stable. Not on precaution, all morning medication given, travel by cart.  Dialysis Catheter Dressing: AVF  Last Dressing Change: N/A

## 2024-12-11 NOTE — CARE PLAN
The patient's goals for the shift include      The clinical goals for the shift include Patient BP will remain HDS    Problem: Skin  Goal: Decreased wound size/increased tissue granulation at next dressing change  Outcome: Progressing  Goal: Participates in plan/prevention/treatment measures  Outcome: Progressing  Goal: Prevent/manage excess moisture  Outcome: Progressing  Goal: Prevent/minimize sheer/friction injuries  Outcome: Progressing  Goal: Promote/optimize nutrition  Outcome: Progressing  Goal: Promote skin healing  Outcome: Progressing     Problem: Pain  Goal: Takes deep breaths with improved pain control throughout the shift  Outcome: Progressing  Goal: Turns in bed with improved pain control throughout the shift  Outcome: Progressing  Goal: Walks with improved pain control throughout the shift  Outcome: Progressing  Goal: Performs ADL's with improved pain control throughout shift  Outcome: Progressing  Goal: Participates in PT with improved pain control throughout the shift  Outcome: Progressing  Goal: Free from opioid side effects throughout the shift  Outcome: Progressing  Goal: Free from acute confusion related to pain meds throughout the shift  Outcome: Progressing     Problem: Fall/Injury  Goal: Not fall by end of shift  Outcome: Progressing  Goal: Be free from injury by end of the shift  Outcome: Progressing  Goal: Verbalize understanding of personal risk factors for fall in the hospital  Outcome: Progressing  Goal: Verbalize understanding of risk factor reduction measures to prevent injury from fall in the home  Outcome: Progressing  Goal: Use assistive devices by end of the shift  Outcome: Progressing  Goal: Pace activities to prevent fatigue by end of the shift  Outcome: Progressing     Problem: Pain - Adult  Goal: Verbalizes/displays adequate comfort level or baseline comfort level  Outcome: Progressing     Problem: Safety - Adult  Goal: Free from fall injury  Outcome: Progressing     Problem:  Discharge Planning  Goal: Discharge to home or other facility with appropriate resources  Outcome: Progressing     Problem: Chronic Conditions and Co-morbidities  Goal: Patient's chronic conditions and co-morbidity symptoms are monitored and maintained or improved  Outcome: Progressing     Problem: Diabetes  Goal: Achieve decreasing blood glucose levels by end of shift  Outcome: Progressing  Goal: Increase stability of blood glucose readings by end of shift  Outcome: Progressing  Goal: Decrease in ketones present in urine by end of shift  Outcome: Progressing  Goal: Maintain electrolyte levels within acceptable range throughout shift  Outcome: Progressing  Goal: Maintain glucose levels >70mg/dl to <250mg/dl throughout shift  Outcome: Progressing  Goal: No changes in neurological exam by end of shift  Outcome: Progressing  Goal: Learn about and adhere to nutrition recommendations by end of shift  Outcome: Progressing  Goal: Vital signs within normal range for age by end of shift  Outcome: Progressing  Goal: Increase self care and/or family involovement by end of shift  Outcome: Progressing  Goal: Receive DSME education by end of shift  Outcome: Progressing

## 2024-12-11 NOTE — DOCUMENTATION CLARIFICATION NOTE
"    PATIENT:               EMILEE JACKSON  ACCT #:                  [id number]  MRN:                       [id number]  :                       1970  ADMIT DATE:       12/3/2024 9:12 PM  DISCH DATE:  RESPONDING PROVIDER #:        [id number]          PROVIDER RESPONSE TEXT:    Acute on Chronic Diastolic Congestive Heart Failure    CDI QUERY TEXT:    Clarification    Instruction:    Based on your assessment of the patient and the clinical information, please provide the requested documentation by clicking on the appropriate radio button and enter any additional information if prompted.    Question: Please further clarify the type and acuity of congestive heart failure    When answering this query, please exercise your independent professional judgment. The fact that a question is being asked, does not imply that any particular answer is desired or expected.    The patient's clinical indicators include:  Clinical Information: From ED note- \"54-year-old female with history of ESRD (TTS), COPD not on oxygen, HFpEF, hypertension, brachial DVT on Eliquis presenting due to concerns of shortness of breath, hypoxia.  Patient was noted to be in hypertensive emergency versus SCA PE.  Patient was placed on a nitro drip, given Lasix, and placed on BiPAP.\"    Clinical Indicators:  From ED provider note- \"Differential diagnoses considered include but are not limited to: Flash pulmonary edema, CHF exacerbation, COPD exacerbation, IHD nonadherence\"    BNP on 12/3/24- 596    From H&P- \"Impression: likely 2/2 flash pulm edema, likely 2/2 fluid redistribution as opposed to fluid overload.\"    Treatment: Lasix, Bipap, IV Lasix, Nitro Drip    Risk Factors:  Flash Pulmonary Edema, SOB, HTN Emergency  Options provided:  -- Acute Diastolic Congestive Heart Failure  -- Acute on Chronic Diastolic Congestive Heart Failure  -- Chronic Diastolic Congestive Heart Failure with Non-Cardiogenic Acute Pulmonary Edema  -- Other - I will add my own " diagnosis  -- Refer to Clinical Documentation Reviewer    Query created by: Jaelyn Samaniego on 12/5/2024 2:47 PM      Electronically signed by:  LOLY SOMERS 12/11/2024 9:03 AM

## 2024-12-11 NOTE — PROGRESS NOTES
Stacey Heath  Age: 54 y.o.  MRN: [id number]  Date: 12/6/2024  Location of service: in community    Program Details  Medicaid Community Clinical Case Management  Status: Enrolled  Effective Dates: 10/17/2023 - present  Responsible Staff: NAREN Davidson      Goals Reviewed:  Problem: Kidney Issues       Goal: Improve health to get on kidney transplant list       Priority: High        Problem: Medication Adherence       Goal: Adherence to Medication Regimen       Priority: High        Problem: Risk of Uncoordinated Care       Goal: Care will be Coordinated and Supported by a Multidisciplinary Team of Providers       Priority: High          Summary:  This writer met with patient in the hospital for a community visit.  Patient discusses the changes that Dr. Barraza made to her medications. Patient states she appreciated this care.  Ludmila SNEED printed out patient's Dr. Barraza approved medication list. This writer encouraged patient to show this list to all providers, especially in the hospital.   Patient discusses her sodium restriction and admits to this writer that she has been having difficulty reading nutrition labels. This writer spends time teaching the patient how to read nutrition labels. Patient is able to provide teach-back.      Appointment start time: 0945  Appointment completion time: 1155  Total time spent with patient (in minutes): 130  Non-Billable Time: 130  Billable Time Total: 0    Roberta Gallego RN

## 2024-12-11 NOTE — PROGRESS NOTES
"Physical Therapy    Physical Therapy Treatment    Patient Name: Stacey Heath  MRN: [id number]  Department: [facility]  Room: 2069/2069-A  Today's Date: 12/11/2024  Time Calculation  Start Time: 1018  Stop Time: 1047  Time Calculation (min): 29 min       Assessment/Plan   PT Assessment  Barriers to Discharge Home: No anticipated barriers  End of Session Communication: Bedside nurse  Assessment Comment: Pt tolerating session well; remains limited by dec balance, endurance, & strength.  Would benefit from continued therapy.  End of Session Patient Position: Bed, 2 rail up, Alarm off, not on at start of session     PT Plan  Treatment/Interventions: Bed mobility, Transfer training, Gait training, Stair training, Balance training, Strengthening, Endurance training, Therapeutic exercise, Therapeutic activity, Positioning, Postural re-education  PT Plan: Ongoing PT  PT Frequency: 3 times per week  PT Discharge Recommendations: Low intensity level of continued care  Equipment Recommended upon Discharge:  (TBD)  PT Recommended Transfer Status: Stand by assist  PT - OK to Discharge: Yes    General Visit Information:   PT  Visit  PT Received On: 12/11/24  Response to Previous Treatment: Patient with no complaints from previous session.  General  Family/Caregiver Present: No  Prior to Session Communication: Bedside nurse  Patient Position Received: Bed, 2 rail up, Alarm off, caregiver present  Preferred Learning Style: verbal, visual  General Comment: Pt sitting up in bed upon PT arrival, RN at bedside; willing to participate in therapy session    Subjective   Precautions:  Precautions  Medical Precautions: Fall precautions    Vital Signs      Vital Signs Comment: During amb: O2 sats % on RA; HR 86-91     Objective   Pain:  Pain Assessment  Pain Assessment: 0-10  0-10 (Numeric) Pain Score:  (Pt does not rate, just c/o discomfort \"all over\")  Pain Location: Generalized  Pain Interventions: Ambulation/increased " activity  Response to Interventions: Content/relaxed  Cognition:  Cognition  Overall Cognitive Status: Within Functional Limits  Coordination:  Movements are Fluid and Coordinated: Yes  Postural Control:  Postural Control  Postural Control: Within Functional Limits  Static Sitting Balance  Static Sitting-Balance Support: Feet supported  Static Sitting-Level of Assistance: Distant supervision  Dynamic Sitting Balance  Dynamic Sitting-Balance Support: Feet supported  Dynamic Sitting-Level of Assistance: Close supervision  Dynamic Sitting-Balance: Forward lean  Static Standing Balance  Static Standing-Balance Support: No upper extremity supported  Static Standing-Level of Assistance: Close supervision  Dynamic Standing Balance  Dynamic Standing-Balance Support: No upper extremity supported  Dynamic Standing-Level of Assistance: Close supervision     Activity Tolerance:  Activity Tolerance  Endurance: Tolerates 10 - 20 min exercise with multiple rests  Treatments:     Therapeutic Activity  Therapeutic Activity Performed: Yes  Therapeutic Activity 1: Pt educated on PLB & able to demo successfully with cueing during functional mobility.  Therapeutic Activity 2: Pt educated on activity pacing with amb & stair climbing during session - able to demo indep with cues.      Bed Mobility  Bed Mobility: Yes  Bed Mobility 1  Bed Mobility 1: Supine to sitting, Sitting to supine  Level of Assistance 1: Independent    Ambulation/Gait Training  Ambulation/Gait Training Performed: Yes  Ambulation/Gait Training 1  Surface 1: Level tile  Device 1: No device  Assistance 1: Close supervision  Quality of Gait 1: Decreased step length (dec brendon)  Comments/Distance (ft) 1: 100' (multiple standing rests 2/2 inc SOB/fatigue)  Transfers  Transfer: Yes  Transfer 1  Technique 1: Sit to stand, Stand to sit  Transfer Level of Assistance 1: Close supervision  Trials/Comments 1: x3 during session    Stairs  Stairs: Yes  Stairs  Rails 1: Right  Curb  Step 1: No  Device 1: Railing  Assistance 1: Contact guard  Comment/Number of Steps 1: x3 steps    Outcome Measures:  [facility] Basic Mobility  Turning from your back to your side while in a flat bed without using bedrails: None  Moving from lying on your back to sitting on the side of a flat bed without using bedrails: None  Moving to and from bed to chair (including a wheelchair): None  Standing up from a chair using your arms (e.g. wheelchair or bedside chair): None  To walk in hospital room: A little  Climbing 3-5 steps with railing: A little  Basic Mobility - Total Score: 22    Education Documentation  Mobility Training, taught by Sandra Haney, PT at 12/11/2024  1:21 PM.  Learner: Patient  Readiness: Acceptance  Method: Explanation  Response: Verbalizes Understanding  Comment: PLB, activity pacing, progression of therapy    Education Comments  No comments found.        OP EDUCATION:       Encounter Problems       Encounter Problems (Active)       PT Problem       Patient will complete bed mobility with independence with HOB flat without using bedrailing   (Progressing)       Start:  12/04/24    Expected End:  12/25/24            Patient will complete STS with independence using No Device without acute LOB   (Progressing)       Start:  12/04/24    Expected End:  12/25/24            Patient will ambulate >/=200' withLRD with independence without acute LOB  (Progressing)       Start:  12/04/24    Expected End:  12/25/24            Patient will ascend/descend 12 steps with x1 handrail and independence without acute LOB.    (Progressing)       Start:  12/04/24    Expected End:  12/25/24            Patient will score >25/28 on the Tinetti Mobility Outcome Assessment (combined gait and balance) in order to demonstrate low fall risk.  (Progressing)       Start:  12/04/24    Expected End:  12/25/24               Pain - Adult            12/11/24 at 1:22 PM - Sandra Haney, PT

## 2024-12-12 ENCOUNTER — DOCUMENTATION (OUTPATIENT)
Dept: HOME HEALTH SERVICES | Facility: HOME HEALTH | Age: 54
End: 2024-12-12
Payer: COMMERCIAL

## 2024-12-12 ENCOUNTER — PHARMACY VISIT (OUTPATIENT)
Dept: PHARMACY | Facility: CLINIC | Age: 54
End: 2024-12-12
Payer: COMMERCIAL

## 2024-12-12 ENCOUNTER — APPOINTMENT (OUTPATIENT)
Dept: DIALYSIS | Facility: HOSPITAL | Age: 54
End: 2024-12-12
Payer: COMMERCIAL

## 2024-12-12 VITALS
WEIGHT: 124.78 LBS | OXYGEN SATURATION: 98 % | BODY MASS INDEX: 28.88 KG/M2 | SYSTOLIC BLOOD PRESSURE: 203 MMHG | HEART RATE: 88 BPM | TEMPERATURE: 97.9 F | DIASTOLIC BLOOD PRESSURE: 91 MMHG | HEIGHT: 55 IN | RESPIRATION RATE: 18 BRPM

## 2024-12-12 PROBLEM — R06.02 SHORTNESS OF BREATH: Status: RESOLVED | Noted: 2024-12-04 | Resolved: 2024-12-12

## 2024-12-12 LAB
ALBUMIN SERPL BCP-MCNC: 3.9 G/DL (ref 3.4–5)
ANION GAP SERPL CALC-SCNC: 20 MMOL/L (ref 10–20)
BUN SERPL-MCNC: 54 MG/DL (ref 6–23)
CALCIUM SERPL-MCNC: 9.6 MG/DL (ref 8.6–10.6)
CHLORIDE SERPL-SCNC: 95 MMOL/L (ref 98–107)
CO2 SERPL-SCNC: 27 MMOL/L (ref 21–32)
CREAT SERPL-MCNC: 6.39 MG/DL (ref 0.5–1.05)
EGFRCR SERPLBLD CKD-EPI 2021: 7 ML/MIN/1.73M*2
ERYTHROCYTE [DISTWIDTH] IN BLOOD BY AUTOMATED COUNT: 17.2 % (ref 11.5–14.5)
GLUCOSE BLD MANUAL STRIP-MCNC: 72 MG/DL (ref 74–99)
GLUCOSE SERPL-MCNC: 238 MG/DL (ref 74–99)
HCT VFR BLD AUTO: 37.3 % (ref 36–46)
HGB BLD-MCNC: 11.3 G/DL (ref 12–16)
MAGNESIUM SERPL-MCNC: 2.31 MG/DL (ref 1.6–2.4)
MCH RBC QN AUTO: 29.3 PG (ref 26–34)
MCHC RBC AUTO-ENTMCNC: 30.3 G/DL (ref 32–36)
MCV RBC AUTO: 97 FL (ref 80–100)
NRBC BLD-RTO: 0 /100 WBCS (ref 0–0)
PHOSPHATE SERPL-MCNC: 6.2 MG/DL (ref 2.5–4.9)
PLATELET # BLD AUTO: 209 X10*3/UL (ref 150–450)
POTASSIUM SERPL-SCNC: 5.1 MMOL/L (ref 3.5–5.3)
RBC # BLD AUTO: 3.86 X10*6/UL (ref 4–5.2)
SODIUM SERPL-SCNC: 137 MMOL/L (ref 136–145)
WBC # BLD AUTO: 4.6 X10*3/UL (ref 4.4–11.3)

## 2024-12-12 PROCEDURE — 85027 COMPLETE CBC AUTOMATED: CPT | Performed by: NURSE PRACTITIONER

## 2024-12-12 PROCEDURE — 90935 HEMODIALYSIS ONE EVALUATION: CPT | Performed by: INTERNAL MEDICINE

## 2024-12-12 PROCEDURE — 2500000001 HC RX 250 WO HCPCS SELF ADMINISTERED DRUGS (ALT 637 FOR MEDICARE OP): Performed by: NURSE PRACTITIONER

## 2024-12-12 PROCEDURE — 2500000005 HC RX 250 GENERAL PHARMACY W/O HCPCS

## 2024-12-12 PROCEDURE — 2500000004 HC RX 250 GENERAL PHARMACY W/ HCPCS (ALT 636 FOR OP/ED): Performed by: NURSE PRACTITIONER

## 2024-12-12 PROCEDURE — 8010000001 HC DIALYSIS - HEMODIALYSIS PER DAY

## 2024-12-12 PROCEDURE — 36415 COLL VENOUS BLD VENIPUNCTURE: CPT | Performed by: NURSE PRACTITIONER

## 2024-12-12 PROCEDURE — 80069 RENAL FUNCTION PANEL: CPT | Performed by: NURSE PRACTITIONER

## 2024-12-12 PROCEDURE — RXMED WILLOW AMBULATORY MEDICATION CHARGE

## 2024-12-12 PROCEDURE — 83735 ASSAY OF MAGNESIUM: CPT | Performed by: NURSE PRACTITIONER

## 2024-12-12 PROCEDURE — 2500000005 HC RX 250 GENERAL PHARMACY W/O HCPCS: Performed by: NURSE PRACTITIONER

## 2024-12-12 PROCEDURE — 82947 ASSAY GLUCOSE BLOOD QUANT: CPT

## 2024-12-12 PROCEDURE — 99239 HOSP IP/OBS DSCHRG MGMT >30: CPT

## 2024-12-12 RX ORDER — HYDRALAZINE HYDROCHLORIDE 10 MG/1
10 TABLET, FILM COATED ORAL 3 TIMES DAILY
Status: DISCONTINUED | OUTPATIENT
Start: 2024-12-12 | End: 2024-12-12

## 2024-12-12 RX ORDER — CLONIDINE HYDROCHLORIDE 0.1 MG/1
0.2 TABLET ORAL NIGHTLY
Qty: 60 TABLET | Refills: 0 | Status: SHIPPED | OUTPATIENT
Start: 2024-12-12 | End: 2025-01-11

## 2024-12-12 RX ORDER — ISOSORBIDE MONONITRATE 30 MG/1
30 TABLET, EXTENDED RELEASE ORAL DAILY
Status: DISCONTINUED | OUTPATIENT
Start: 2024-12-12 | End: 2024-12-12

## 2024-12-12 RX ORDER — HYDRALAZINE HYDROCHLORIDE 10 MG/1
10 TABLET, FILM COATED ORAL 3 TIMES DAILY
Qty: 90 TABLET | Refills: 0 | Status: SHIPPED | OUTPATIENT
Start: 2024-12-12 | End: 2024-12-12 | Stop reason: HOSPADM

## 2024-12-12 RX ORDER — ISOSORBIDE MONONITRATE 30 MG/1
30 TABLET, EXTENDED RELEASE ORAL DAILY
Qty: 30 TABLET | Refills: 0 | Status: SHIPPED | OUTPATIENT
Start: 2024-12-12 | End: 2024-12-12 | Stop reason: HOSPADM

## 2024-12-12 RX ORDER — CLONIDINE HYDROCHLORIDE 0.1 MG/1
0.1 TABLET ORAL EVERY 12 HOURS SCHEDULED
Qty: 60 TABLET | Refills: 0 | Status: SHIPPED | OUTPATIENT
Start: 2024-12-12 | End: 2024-12-12 | Stop reason: HOSPADM

## 2024-12-12 RX ORDER — CLONIDINE HYDROCHLORIDE 0.1 MG/1
0.1 TABLET ORAL EVERY 12 HOURS SCHEDULED
Status: DISCONTINUED | OUTPATIENT
Start: 2024-12-12 | End: 2024-12-12 | Stop reason: HOSPADM

## 2024-12-12 ASSESSMENT — PAIN - FUNCTIONAL ASSESSMENT
PAIN_FUNCTIONAL_ASSESSMENT: NO/DENIES PAIN
PAIN_FUNCTIONAL_ASSESSMENT: NO/DENIES PAIN

## 2024-12-12 ASSESSMENT — PAIN SCALES - GENERAL
PAINLEVEL_OUTOF10: 0 - NO PAIN
PAINLEVEL_OUTOF10: 0 - NO PAIN

## 2024-12-12 NOTE — HH CARE COORDINATION
Home Care received a Referral to Resume Care for Nursing and Physical Therapy. We have processed the referral for a Resumption of Care on 24-48 HOURS .     If you have any questions or concerns, please feel free to contact us at [phone]. Follow the prompts, enter your five digit zip code, and you will be directed to your care team on [facility].

## 2024-12-12 NOTE — PROGRESS NOTES
Renal Staff HD Note    Patient seen, examined on dialysis   Tolerating treatment without event  AVF 53.9 kg 115/69 81      BP Readings from Last 3 Encounters:   12/12/24 152/68   12/01/24 (!) 215/71   11/27/24 (!) 196/85     [unfilled]  Lab Results   Component Value Date    CREATININE 6.39 (H) 12/12/2024    BUN 54 (H) 12/12/2024     12/12/2024    K 5.1 12/12/2024    CL 95 (L) 12/12/2024    CO2 27 12/12/2024     Lab Results   Component Value Date    PTH 1,415.0 (H) 05/31/2024    CALCIUM 9.6 12/12/2024    CAION 1.18 03/14/2023    PHOS 6.2 (H) 12/12/2024     @  Lab Results   Component Value Date    HGB 11.3 (L) 12/12/2024       Continue treatment per submitted orders   500 ml as tolerated  Change clonidine to 0.2 at bedtime  Walk test  Review medications with patient prior to discharge  Increase DW to todays post treatment weight

## 2024-12-12 NOTE — NURSING NOTE
Report to Receiving RN:    Report To: NGOZI Moreno  Time Report Called: 1200  Hand-Off Communication: PT STABLE, POST VITALS: /82, HR 86, PT REMOVED 0.8 LITERS  Complications During Treatment: No  Ultrafiltration Treatment: Yes  Medications Administered During Dialysis: No  Blood Products Administered During Dialysis: No  Labs Sent During Dialysis: Yes  Heparin Drip Rate Changes: N/A  Dialysis Catheter Dressing: N/A  Last Dressing Change: N/A      Last Updated: 12:01 PM by DULCE CUNNINGHAM

## 2024-12-12 NOTE — DISCHARGE SUMMARY
Discharge Diagnosis  Shortness of breath    Issues Requiring Follow-Up  1) PCP:  - Post-hospital admission follow-up    2) Nephrology  - ESRD and HTN management  --- No changes to BP meds unless approved by [facility]    Test Results Pending At Discharge  Pending Labs       No current pending labs.            Hospital Course  Stacey Heath is a 54 y.o. 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  Oxygen saturation was found to be 82% on room air and markedly tachypneic and tachycardic on exam and was unable to speak in sentences. But saturation improved on 3L.   The patient is able to endorse that she got all of her dialysis during day was acutely short of breath roughly 1 hour prior to arrival to ED.  She did attempt to use breathing treatments, however, was unsuccessful.  She does endorse some chest pain, but no abdominal pain.  She denies any nausea vomiting or diarrhea.  She does endorse increased coughing. VS in ED with BP too high for machine to read;  CXR was consistent with pulmonary edema; SOB 2/2 hypertensive emergency w/ flash PE.   She was placed on BIPAP 18/8 11L and given 80mg lasix and was placed on a nitro drip.  Initial VBG with CO2 of 57; down trended (57 -> 49), she was taken off BiPAP  and started on Cardene and nitroglycerin. Transferred to MICU for further treatment.  Pt had dialysis on Tuesday and did not miss any of her BP meds. Unclear what the trigger was. She's had numerous admissions for the same thing. (on 11/9 to the CICU and was discharged from hospital 11/20).    In  the MICU cardene drip was weaned off and she was continued on home medications.  Nephrology was consulted with plan for HD on 12/6.  On 12/5 she was transferred to the SDU for further close monitoring of HTN with goal to keep MAP > 65 and SBP > 90 in setting of  lowering BP.   On floor patient BP was controlled with Carvedilol 25 mg twice daily, Valsartan 320 mg daily, Nifedipine 90 mg daily, Clonidine 0.1 mg at bedtime and PRN Clonidine 0.1 mg every 6 hrs for SBP >180. Patient requiring 1x prn clonidine daily. Discharged on Carvedilol 25 mg twice daily; Valsartan 320 mg daily; Nifedipine 90 mg daily; Clonidine 0.2 at bedtime per Dr. Barraza.     DAY OF DISCHARGE:    On the day of discharge, the patient was seen and evaluated by the Medicine team and deemed suitable for discharge to home with home care PT & nursing (to ensure medication compliance). There were no significant events overnight.  Vitals were reviewed and within normal limits. Labs were stable at discharge.  Plan of care for the day included ensuring that the patient had good p.o. intake, was ambulating well, and was stable.    I have spent in excess of 30 minutes in regards to chart review, treatment and medical management of this patient.       Pertinent Physical Exam At Time of Discharge  Physical Exam    Home Medications     Medication List      START taking these medications     cloNIDine 0.1 mg tablet; Commonly known as: Catapres; Take 2 tablets   (0.2 mg) by mouth once daily at bedtime.   diclofenac sodium 1 % gel; Commonly known as: Voltaren; Apply 4.5 inches   (4 g) topically 4 times a day as needed (back pain).   Lidocaine Pain Relief 4 % patch; Generic drug: lidocaine; Place 1 patch   over 12 hours on the skin once daily. Remove & discard patch within 12   hours or as directed by MD.     CHANGE how you take these medications     acetaminophen 325 mg tablet; Commonly known as: Tylenol; Take 2 tablets   (650 mg) by mouth every 6 hours if needed for headaches.; What changed:   medication strength, how much to take, when to take this, reasons to take   this   carvedilol 25 mg tablet; Commonly known as: Coreg; Take 1 tablet (25 mg)   by mouth 2 times a day.; What changed: how much to take   NIFEdipine ER  90 mg 24 hr tablet; Commonly known as: Adalat CC; Take 1   tablet (90 mg) by mouth once daily at bedtime. Do not crush, chew, or   split.; What changed: when to take this     CONTINUE taking these medications     * albuterol 90 mcg/actuation inhaler; Commonly known as: ProAir HFA;   Inhale 2 puffs every 4 hours if needed for wheezing or shortness of   breath.   * albuterol 1.25 mg/3 mL nebulizer solution; Take 3 mL (1.25 mg) by   nebulization every 6 hours if needed for wheezing or shortness of breath.   aspirin 81 mg EC tablet; Take 1 tablet (81 mg) by mouth once daily.   atorvastatin 80 mg tablet; Commonly known as: Lipitor; Take 1 tablet (80   mg) by mouth once daily at bedtime.   Breo Ellipta 100-25 mcg/dose inhaler; Generic drug: fluticasone   furoate-vilanteroL; Inhale 1 puff once daily.   calcium acetate 667 mg capsule; Commonly known as: Phoslo; Take 2   capsules (1,334 mg) by mouth 3 times daily (morning, midday, late   afternoon).   docusate sodium 100 mg capsule; Commonly known as: Colace; Take 1   capsule (100 mg) by mouth 2 times a day as needed for constipation.   Eliquis 5 mg tablet; Generic drug: apixaban; Take 1 tablet (5 mg) by   mouth 2 times a day.   epoetin joceline 10,000 unit/mL injection; Commonly known as:   Epogen,Procrit; Inject 1 mL (10,000 Units) under the skin 3 times a week.   fluticasone 50 mcg/actuation nasal spray; Commonly known as: Flonase;   Administer 2 sprays into each nostril once daily. Shake gently. Before   first use, prime pump. After use, clean tip and replace cap.   gabapentin 300 mg capsule; Commonly known as: Neurontin; Take 1 capsule   (300 mg) by mouth once daily at bedtime.   pantoprazole 40 mg EC tablet; Commonly known as: ProtoNix; Take 1 tablet   (40 mg) by mouth if needed (not regular take it). Do not crush, chew, or   split.   polyethylene glycol 17 gram/dose powder; Commonly known as: Glycolax,   Miralax; Take 17 g (1 scoop dissolved in liquid) by mouth once  daily.   Renal Caps 1 mg capsule; Generic drug: vitamin B complex-vitamin C-folic   acid; Take 1 capsule by mouth once daily.   SUMAtriptan 25 mg tablet; Commonly known as: Imitrex; Take 1 tablet (25   mg) by mouth 1 time if needed for migraine. May repeat dose once in 2   hours if no relief.  Do not exceed 2 doses in 24 hours.   torsemide 20 mg tablet; Commonly known as: Demadex; Take 2 tablets once   daily on non-dialysis days only. Do not take on dialysis days   valsartan 320 mg tablet; Commonly known as: Diovan; Take 1 tablet (320   mg) by mouth once daily in the evening.  * This list has 2 medication(s) that are the same as other medications   prescribed for you. Read the directions carefully, and ask your doctor or   other care provider to review them with you.     STOP taking these medications     cloNIDine 0.2 mg/24 hr patch; Commonly known as: Catapres-TTS   cyclobenzaprine 10 mg tablet; Commonly known as: Flexeril   hydrALAZINE 100 mg tablet; Commonly known as: Apresoline   hydrOXYzine HCL 25 mg tablet; Commonly known as: Atarax   isosorbide mononitrate  mg 24 hr tablet; Commonly known as: Imdur       Outpatient Follow-Up  Future Appointments   Date Time Provider Department [facility]   12/15/2024  2:00 AM Dona Lang OT [facility]   12/20/2024 11:00 [facility]   1/8/2025  3:00 PM Leyla Torres MD [id number] [facility]   1/17/2025 11:40 AM Judy Alcaraz MD [id number] Academic   1/27/2025  4:00 PM Makenna Barraza MD [id number] Academic   3/25/2025  1:20 PM Nate Hyman MD [id number] Academic       Jayla Otero MD

## 2024-12-12 NOTE — CARE PLAN
The patient's goals for the shift include      The clinical goals for the shift include Patient will have more stabilized blood pressure during shift    Over the shift, the patient did not make progress toward the following goals. Barriers to progression include acute disease process. Recommendations to address these barriers include adherence to treatment plan  .

## 2024-12-12 NOTE — PROGRESS NOTES
Physical Therapy                 Therapy Communication Note    Patient Name: Stacey Heath  MRN: [id number]  Department: [facility]  Room: 2069/2069-A  Today's Date: 12/12/2024     Discipline: Physical Therapy    Missed Visit Reason: Missed Visit Reason: Other (Comment) (Pt off the floor at dialysis. Will re-attempt as schedule permits.)    Missed Time: 10:08 AM

## 2024-12-12 NOTE — NURSING NOTE
12/12/2024 Nursing note.  Patient discharged to home today  patient went home with Meds to Bed from [facility]. RN discussed discharge instructions which included changes to her medications  Patient verbalized understanding and copy of AVS was given to the Patient

## 2024-12-12 NOTE — CARE PLAN
The patient's goals for the shift include      The clinical goals for the shift include patient will complete dialysis with no complications for today's shift

## 2024-12-12 NOTE — NURSING NOTE
Report from Sending RN:      ReportFrom:      Milly ( RN)                                                                                                                                                                                                                                                                                                                                                                                                                                                                                                                                                                                                                                                                                                                                                                                                                                                                                                                                                                                                                                                                                                                                                                                                                                                                                                                                                                                                                                                                                                                                                                                                                                                                                                                Recent Surgery of Procedure: No  Baseline Level of Consciousness (LOC): a/o x 4  Oxygen Use: none  Type: none  Diabetic: No  Last BP Med Given Day of Dialysis: none  Last Pain Med Given: none  Lab Tests to be Obtained with Dialysis: Yes, cbc,  magnesium, rfp  Blood Transfusion to be Given During Dialysis: No  Available IV Access: none  Medications to be Administered During Dialysis: none  Continuous IV Infusion Running: No  Restraints on Currently or in the Last 24 Hours: No  Hand-Off Communication: No acute overnight or morning events; vss; Pt did not take morning medications; Pt will need labs; pt is a full code. Cassidy Kenyon RN  Dialysis Catheter Dressing: AVF  Last Dressing Change: yes

## 2024-12-12 NOTE — PROGRESS NOTES
12/12/24 1612   Discharge Planning   Expected Discharge Disposition [facility]  ([facility])     [facility] confirmed SLAVA in 1-2 days. Will update pt's RN and resource RN.

## 2024-12-12 NOTE — DISCHARGE INSTRUCTIONS
Thank you for allowing us the Medicine team to participate in you healthcare.  You were admitted to the hospital for shortness of breath due to fluid overload from high blood pressure.  Your Your dry weight is 53.5 kg.  You were treated with dialysis and changes to your medication plan:  Your medications were adjusted by Dr. Barraza and are:  -Carvedilol 25 mg twice daily  -Valsartan 320 mg daily  -Nifedipine 90 mg daily  -Clonidine 0.2 at bedtime    You can use lidocaine patch, Voltaren gel and tylenol for pain.    Please review the medication section below to see what changes were made to your regimen.  Please review the appointment section below to see what follow up visits were arranged for you.  We referred you to home care PT and nursing which will come work with you at home.

## 2024-12-12 NOTE — PROGRESS NOTES
Stacey Heath  Age: 54 y.o.  MRN: [id number]  Date: 12/11/2024  Location of service: in community    Program Details  Medicaid Community Clinical Case Management  Status: Enrolled  Effective Dates: 10/17/2023 - present  Responsible Staff: NAREN Davidson      Goals Reviewed:  Problem: Access to Care Issue       Goal: Assess and Address Access Barriers       Priority: Medium        Problem: Anxiety       Goal: Maintain coping skills for continuous improvement       Priority: Medium          Problem: Kidney Issues       Goal: Improve health to get on kidney transplant list       Priority: High        Problem: Medication Adherence       Goal: Adherence to Medication Regimen       Priority: High              Summary:  This provider met with patient at [facility] where patient has been admitted for the last several days. This provider listened with empathy as patient talked about the need to look for housing and how she is trying to prepare for the holiday while taking care of her health needs. This provider completed the quarterly PEN13 survey with the patient. This provider also updated the patient on a recent meeting the Personalized Care Team had with Dr. Barraza regarding the patient's medical case. This provider gave the patient a paper copy of the medication plan so that she has it with her when she goes to the ED.                      NAREN Davidson

## 2024-12-12 NOTE — PROGRESS NOTES
Recreation Therapy Note    Therapy Session  Visit Type: New visit  Session Start Time: 1710  Session End Time: 1810  Intervention Delivery: In-person  Conflict of Service: None  Number of family members present: 0  Family Present for Session: None  Family Participation: None  Number of staff members present: 1    Pre-assessment  Mood/Affect: Appropriate, Calm  Verbalized Emotional State:  (none verbalized)    Treatment  Areas of Focus: Coping, Socialization, Normalization, Self-expression, Stress reduction  Co-Treatment:  (none)  Interruption: No  Patient Fell Asleep at End of Session: No    Post-assessment  Mood/Affect: Appropriate, Calm, Cooperative, Participative  Verbalized Emotional State:  (none)  Continue Visiting: Yes  Total Session Time (min): 60 minutes    Narrative  Assessment Detail: Upon arrival patient was walking in her room.  Agreed to engage in a recreational therapy session.  Plan: To encourage the exploration of safe and effective positive leisure coping skills to manage situational stressors.  Intervention: Patient chose a cognitive game to play.  Evaluation: Patient was appropriate, focused and interactive.  Invested in the game and initiated conversation. Happy to be discharging tomorrow.  Follow-up: Will continue to encourage the exploration of positive leisure coping skills.  Patient Comments: None noted      Stacey Heath is a 54 y.o. female on day 7 of admission presenting with Shortness of breath.

## 2024-12-12 NOTE — PROGRESS NOTES
Renal Staff HD Note--late note    Patient seen, examined on dialysis   Tolerating treatment without event  163/74 57.6 (51) AVF 2K      BP Readings from Last 3 Encounters:   12/11/24 (!) 202/84 12/01/24 (!) 215/71   11/27/24 (!) 196/85     [unfilled]  Lab Results   Component Value Date    CREATININE 7.81 (H) 12/11/2024    BUN 62 (H) 12/11/2024     12/11/2024    K 5.5 (H) 12/11/2024    CL 95 (L) 12/11/2024    CO2 27 12/11/2024     Lab Results   Component Value Date    PTH 1,415.0 (H) 05/31/2024    CALCIUM 10.1 12/11/2024    CAION 1.18 03/14/2023    PHOS 6.4 (H) 12/11/2024     @  Lab Results   Component Value Date    HGB 12.1 12/11/2024       Continue treatment per submitted orders    Needed to stop early secondary to hypotension, nausea

## 2024-12-13 ENCOUNTER — DOCUMENTATION (OUTPATIENT)
Dept: BEHAVIORAL HEALTH | Facility: CLINIC | Age: 54
End: 2024-12-13
Payer: COMMERCIAL

## 2024-12-13 LAB
ATRIAL RATE: 74 BPM
P AXIS: 45 DEGREES
P OFFSET: 189 MS
P ONSET: 139 MS
PR INTERVAL: 160 MS
Q ONSET: 219 MS
QRS COUNT: 12 BEATS
QRS DURATION: 88 MS
QT INTERVAL: 396 MS
QTC CALCULATION(BAZETT): 439 MS
QTC FREDERICIA: 425 MS
R AXIS: 16 DEGREES
T AXIS: 169 DEGREES
T OFFSET: 417 MS
VENTRICULAR RATE: 74 BPM

## 2024-12-15 ENCOUNTER — HOME CARE VISIT (OUTPATIENT)
Dept: HOME HEALTH SERVICES | Facility: HOME HEALTH | Age: 54
End: 2024-12-15
Payer: COMMERCIAL

## 2024-12-16 ENCOUNTER — HOME CARE VISIT (OUTPATIENT)
Dept: HOME HEALTH SERVICES | Facility: HOME HEALTH | Age: 54
End: 2024-12-16
Payer: COMMERCIAL

## 2024-12-17 ENCOUNTER — DOCUMENTATION (OUTPATIENT)
Dept: CARE COORDINATION | Facility: CLINIC | Age: 54
End: 2024-12-17
Payer: COMMERCIAL

## 2024-12-17 SDOH — SOCIAL STABILITY: SOCIAL NETWORK: HOW OFTEN DO YOU GET TOGETHER WITH FRIENDS OR RELATIVES?: PATIENT UNABLE TO ANSWER

## 2024-12-17 SDOH — SOCIAL STABILITY: SOCIAL INSECURITY
WITHIN THE LAST YEAR, HAVE YOU BEEN RAPED OR FORCED TO HAVE ANY KIND OF SEXUAL ACTIVITY BY YOUR PARTNER OR EX-PARTNER?: PATIENT UNABLE TO ANSWER

## 2024-12-17 SDOH — HEALTH STABILITY: MENTAL HEALTH: HOW OFTEN DO YOU HAVE A DRINK CONTAINING ALCOHOL?: PATIENT UNABLE TO ANSWER

## 2024-12-17 SDOH — HEALTH STABILITY: MENTAL HEALTH: HOW MANY DRINKS CONTAINING ALCOHOL DO YOU HAVE ON A TYPICAL DAY WHEN YOU ARE DRINKING?: PATIENT UNABLE TO ANSWER

## 2024-12-17 SDOH — SOCIAL STABILITY: SOCIAL NETWORK: HOW OFTEN DO YOU ATTEND CHURCH OR RELIGIOUS SERVICES?: PATIENT UNABLE TO ANSWER

## 2024-12-17 SDOH — SOCIAL STABILITY: SOCIAL INSECURITY
WITHIN THE LAST YEAR, HAVE YOU BEEN HUMILIATED OR EMOTIONALLY ABUSED IN OTHER WAYS BY YOUR PARTNER OR EX-PARTNER?: PATIENT UNABLE TO ANSWER

## 2024-12-17 SDOH — SOCIAL STABILITY: SOCIAL NETWORK: IN A TYPICAL WEEK, HOW MANY TIMES DO YOU TALK ON THE PHONE WITH FAMILY, FRIENDS, OR NEIGHBORS?: PATIENT UNABLE TO ANSWER

## 2024-12-17 SDOH — SOCIAL STABILITY: SOCIAL NETWORK
DO YOU BELONG TO ANY CLUBS OR ORGANIZATIONS SUCH AS CHURCH GROUPS, UNIONS, FRATERNAL OR ATHLETIC GROUPS, OR SCHOOL GROUPS?: PATIENT UNABLE TO ANSWER

## 2024-12-17 SDOH — HEALTH STABILITY: PHYSICAL HEALTH
ON AVERAGE, HOW MANY DAYS PER WEEK DO YOU ENGAGE IN MODERATE TO STRENUOUS EXERCISE (LIKE A BRISK WALK)?: PATIENT UNABLE TO ANSWER

## 2024-12-17 SDOH — HEALTH STABILITY: MENTAL HEALTH
DO YOU FEEL STRESS - TENSE, RESTLESS, NERVOUS, OR ANXIOUS, OR UNABLE TO SLEEP AT NIGHT BECAUSE YOUR MIND IS TROUBLED ALL THE TIME - THESE DAYS?: PATIENT UNABLE TO ANSWER

## 2024-12-17 SDOH — ECONOMIC STABILITY: FOOD INSECURITY
WITHIN THE PAST 12 MONTHS, THE FOOD YOU BOUGHT JUST DIDN'T LAST AND YOU DIDN'T HAVE MONEY TO GET MORE.: PATIENT UNABLE TO ANSWER

## 2024-12-17 SDOH — ECONOMIC STABILITY: HOUSING INSECURITY: AT ANY TIME IN THE PAST 12 MONTHS, WERE YOU HOMELESS OR LIVING IN A SHELTER (INCLUDING NOW)?: PATIENT UNABLE TO ANSWER

## 2024-12-17 SDOH — ECONOMIC STABILITY: HOUSING INSECURITY
IN THE LAST 12 MONTHS, WAS THERE A TIME WHEN YOU WERE NOT ABLE TO PAY THE MORTGAGE OR RENT ON TIME?: PATIENT UNABLE TO ANSWER

## 2024-12-17 SDOH — SOCIAL STABILITY: SOCIAL INSECURITY: WITHIN THE LAST YEAR, HAVE YOU BEEN AFRAID OF YOUR PARTNER OR EX-PARTNER?: PATIENT UNABLE TO ANSWER

## 2024-12-17 SDOH — SOCIAL STABILITY: SOCIAL INSECURITY
WITHIN THE LAST YEAR, HAVE YOU BEEN KICKED, HIT, SLAPPED, OR OTHERWISE PHYSICALLY HURT BY YOUR PARTNER OR EX-PARTNER?: PATIENT UNABLE TO ANSWER

## 2024-12-17 SDOH — ECONOMIC STABILITY: GENERAL
HOW HARD IS IT FOR YOU TO PAY FOR THE VERY BASICS LIKE FOOD, HOUSING, MEDICAL CARE, AND HEATING?: PATIENT UNABLE TO ANSWER

## 2024-12-17 SDOH — ECONOMIC STABILITY: FOOD INSECURITY
WITHIN THE PAST 12 MONTHS, YOU WORRIED THAT YOUR FOOD WOULD RUN OUT BEFORE YOU GOT THE MONEY TO BUY MORE.: PATIENT UNABLE TO ANSWER

## 2024-12-17 SDOH — HEALTH STABILITY: MENTAL HEALTH: HOW OFTEN DO YOU HAVE SIX OR MORE DRINKS ON ONE OCCASION?: PATIENT UNABLE TO ANSWER

## 2024-12-17 SDOH — SOCIAL STABILITY: SOCIAL NETWORK: HOW OFTEN DO YOU ATTEND MEETINGS OF THE CLUBS OR ORGANIZATIONS YOU BELONG TO?: PATIENT UNABLE TO ANSWER

## 2024-12-17 SDOH — ECONOMIC STABILITY: GENERAL
IN THE PAST 12 MONTHS HAS THE ELECTRIC, GAS, OIL, OR WATER COMPANY THREATENED TO SHUT OFF SERVICES IN YOUR HOME?: PATIENT UNABLE TO ANSWER

## 2024-12-17 SDOH — ECONOMIC STABILITY: TRANSPORTATION INSECURITY
IN THE PAST 12 MONTHS, HAS LACK OF TRANSPORTATION KEPT YOU FROM MEDICAL APPOINTMENTS OR FROM GETTING MEDICATIONS?: PATIENT UNABLE TO ANSWER

## 2024-12-17 SDOH — SOCIAL STABILITY: SOCIAL INSECURITY: ARE YOU MARRIED, WIDOWED, DIVORCED, SEPARATED, NEVER MARRIED, OR LIVING WITH A PARTNER?: PATIENT UNABLE TO ANSWER

## 2024-12-17 SDOH — HEALTH STABILITY: MENTAL HEALTH: ON AVERAGE, HOW MANY MINUTES DO YOU ENGAGE IN EXERCISE AT THIS LEVEL?: PATIENT UNABLE TO ANSWER

## 2024-12-17 ASSESSMENT — ACTIVITIES OF DAILY LIVING (ADL): LACK_OF_TRANSPORTATION: PATIENT UNABLE TO ANSWER

## 2024-12-17 ASSESSMENT — LIFESTYLE VARIABLES
AUDIT-C TOTAL SCORE: -1
SKIP TO QUESTIONS 9-10: 0

## 2024-12-17 NOTE — PROGRESS NOTES
12/17/2024     Called Patient to set up delivery of food. Delivery done today 12/17/2024     Signed  LORRI BLAS   HIV/HCV FOCUS Program  Certified Community Health Worker - Research  Please contact me via email or "[employer]hart with questions

## 2024-12-17 NOTE — PROGRESS NOTES
Stacey Heath  Age: 54 y.o.  MRN: [id number]  Date: 12/13/2024  Location of service: phone call    Program Details  Medicaid Community Clinical Case Management  Status: Enrolled  Effective Dates: 10/17/2023 - present  Responsible Staff: NAREN Davidson      Goals Reviewed:      Summary:  This writer calls patient after this writer arrived for our appointment and patient was not at home. This writer and patient reschedule our appointment.    Appointment start time: 0930  Appointment completion time: 0934  Total time spent with patient (in minutes): 4  Non-Billable Time: 4  Billable Time Total: 0    Roberta Gallego RN

## 2024-12-17 NOTE — PROGRESS NOTES
12/17/2024     I spoke with patient to set up time for food delivery.    Signed  NANDA MACIEL [facility]   HIV/HCV FOCUS Program  Certified Community Health Worker - Research  Please contact me via email or MyChart with questions

## 2024-12-17 NOTE — PROGRESS NOTES
12/17/2024     CHW delivered food from [employer] to Patient.     Signed  LORRI BLAS   HIV/HCV FOCUS Program  Certified Community Health Worker - Research  Please contact me via email or MyChart with questions

## 2024-12-18 ENCOUNTER — HOME CARE VISIT (OUTPATIENT)
Dept: HOME HEALTH SERVICES | Facility: HOME HEALTH | Age: 54
End: 2024-12-18
Payer: COMMERCIAL

## 2024-12-18 VITALS
OXYGEN SATURATION: 100 % | DIASTOLIC BLOOD PRESSURE: 100 MMHG | TEMPERATURE: 97.6 F | RESPIRATION RATE: 18 BRPM | SYSTOLIC BLOOD PRESSURE: 172 MMHG | HEART RATE: 85 BPM

## 2024-12-18 PROCEDURE — G0299 HHS/HOSPICE OF RN EA 15 MIN: HCPCS

## 2024-12-19 ENCOUNTER — HOME CARE VISIT (OUTPATIENT)
Dept: HOME HEALTH SERVICES | Facility: HOME HEALTH | Age: 54
End: 2024-12-19
Payer: COMMERCIAL

## 2024-12-19 NOTE — HOME HEALTH
[facility] HOMECARE SERVICES SDOH  SDOH RESOURCE NAME:[facility]  RESOURCE CONTACT:Dolores CABEZAS(X )  WRAAA( )  [employer]ITE[employer]( )  [employer]( )  DIGITAL/CONNECTION( )  HOUSING( )  TRANSPORATION( )  SOCIAL CONNECTIONS( )  STRESS/SUPPORT( )  UTLILITIES( )  DEPRESSION( )  DRUG/ALCOHOL/TOBACCO ( )  FINANCIAL STRAIN( )  FOOD INSECURITY( )  PASSPORT( )  PHONE/ADP PROGRAM( )  HOUSEHOLD/FURNITURE( )  ASSIT.LIVING( )     OTHER( )    FOLLOW UP: YES ( X) NO( )Refferal sent [facility] for SCALE FOR WEIGHT approved home visit schedued for 12/19  ADDTIONAL COMMENT:

## 2024-12-19 NOTE — HOME HEALTH
[facility] HOMECARE SERVICES SDOH  SDOH RESOURCE NAME:[employer]  RESOURCE CONTACT:Dolores CABEZAS(x )  WRAAA( )  UNITEUS( )  [employer]ANK( )  DIGITAL/CONNECTION( )  HOUSING( )  TRANSPORATION( )  SOCIAL CONNECTIONS( )  STRESS/SUPPORT( )  UTLILITIES( )  DEPRESSION( )  DRUG/ALCOHOL/TOBACCO ( )  FINANCIAL STRAIN( )  FOOD INSECURITY( )  PASSPORT( )  PHONE/ADP PROGRAM( )  HOUSEHOLD/FURNITURE( )  ASSIT.LIVING( )     OTHER( )    FOLLOW UP: YES ( ) NO(x) During Home Visit Pt received a [employer] donation for a scale(weight)  ADDTIONAL COMMENT:

## 2024-12-20 ENCOUNTER — OFFICE VISIT (OUTPATIENT)
Dept: NEPHROLOGY | Facility: CLINIC | Age: 54
End: 2024-12-20
Payer: COMMERCIAL

## 2024-12-20 ENCOUNTER — APPOINTMENT (OUTPATIENT)
Dept: CARE COORDINATION | Facility: CLINIC | Age: 54
End: 2024-12-20
Payer: COMMERCIAL

## 2024-12-20 ENCOUNTER — DOCUMENTATION (OUTPATIENT)
Dept: BEHAVIORAL HEALTH | Facility: CLINIC | Age: 54
End: 2024-12-20

## 2024-12-20 VITALS
DIASTOLIC BLOOD PRESSURE: 87 MMHG | BODY MASS INDEX: 28.12 KG/M2 | SYSTOLIC BLOOD PRESSURE: 195 MMHG | WEIGHT: 121 LBS | HEART RATE: 83 BPM

## 2024-12-20 DIAGNOSIS — F41.9 ANXIETY: ICD-10-CM

## 2024-12-20 DIAGNOSIS — J81.0 FLASH PULMONARY EDEMA: ICD-10-CM

## 2024-12-20 PROCEDURE — 3044F HG A1C LEVEL LT 7.0%: CPT | Performed by: INTERNAL MEDICINE

## 2024-12-20 PROCEDURE — 3079F DIAST BP 80-89 MM HG: CPT | Performed by: INTERNAL MEDICINE

## 2024-12-20 PROCEDURE — 99213 OFFICE O/P EST LOW 20 MIN: CPT | Performed by: INTERNAL MEDICINE

## 2024-12-20 PROCEDURE — 4010F ACE/ARB THERAPY RXD/TAKEN: CPT | Performed by: INTERNAL MEDICINE

## 2024-12-20 PROCEDURE — 3077F SYST BP >= 140 MM HG: CPT | Performed by: INTERNAL MEDICINE

## 2024-12-20 PROCEDURE — 3048F LDL-C <100 MG/DL: CPT | Performed by: INTERNAL MEDICINE

## 2024-12-20 PROCEDURE — 1036F TOBACCO NON-USER: CPT | Performed by: INTERNAL MEDICINE

## 2024-12-20 RX ORDER — TORSEMIDE 20 MG/1
TABLET ORAL
Qty: 44 TABLET | Refills: 2 | Status: SHIPPED | OUTPATIENT
Start: 2024-12-20

## 2024-12-20 NOTE — PROGRESS NOTES
I reviewed the resident/fellow's documentation and discussed the patient with the resident/fellow. I agree with the resident/fellow's medical decision making as documented in the note.    Elvira Ryder MD

## 2024-12-20 NOTE — PROGRESS NOTES
Chief Complaint: Follow up CKD    No specific complaints  Denies nausea, vomiting, chest pain, dyspnea  No urinary symptoms    NAD  Sclera AI s inj  MMM, no sores  Deferred secondary to COVID  No edema  No tremor  No rash    ESRD  HTN, vol overload  Proteinuria    Increase torsemide to twice daily on non dialysis days and once daily after dialysis on dialysis days

## 2024-12-20 NOTE — PROGRESS NOTES
"Nutrition Follow-up   Dietitian 2 wk follow-up. Pt is being seen Virtual for  renal diet edu    Labs  Lab Results   Component Value Date    HGBA1C 4.7 11/09/2024    HGBA1C 5.2 06/29/2024    HGBA1C 3.7 03/29/2024     12/12/2024    K 5.1 12/12/2024    CL 95 (L) 12/12/2024    CO2 27 12/12/2024    BUN 54 (H) 12/12/2024    CREATININE 6.39 (H) 12/12/2024    CALCIUM 9.6 12/12/2024    ALBUMIN 3.9 12/12/2024    PROT 8.1 12/03/2024    BILITOT 0.6 12/03/2024    ALKPHOS 155 (H) 12/03/2024    ALT 25 12/03/2024    AST 76 (H) 12/03/2024    GLUCOSE 238 (H) 12/12/2024    BHYDRXBUT 0.08 12/16/2019    CPEPTIDE 10.0 (H) 03/29/2024       Lab Results   Component Value Date    CHOL 108 11/09/2024    LDLCALC 54 11/09/2024    TRIG 46 11/09/2024    HDL 45.1 11/09/2024    LDLF 99 12/12/2020          Comments:     CMP:  Gluc elevated (unsure if this was fasting), BUN/Cr remain elevated, K+ wnl  Lipid panel:  not new   A1C:  not new   Phos: 6.2 on 12/12 (down from 6.4 on 12/11)     Anthropometrics  Ht Readings from Last 1 Encounters:   12/06/24 1.397 m (4' 7\")       BMI Readings from Last 1 Encounters:   12/11/24 29.00 kg/m²       Wt Readings from Last 10 Encounters:   12/11/24 56.6 kg (124 lb 12.5 oz)   12/01/24 52.6 kg (116 lb)   11/26/24 52.6 kg (116 lb)   11/25/24 52.6 kg (116 lb)   11/24/24 52.2 kg (115 lb)   11/19/24 53.5 kg (117 lb 15.1 oz)   11/08/24 55.4 kg (122 lb 1.6 oz)   11/06/24 47.2 kg (104 lb)   11/01/24 53.1 kg (117 lb)   10/24/24 57.6 kg (126 lb 15.8 oz)       Weight change: 12/11 wt from dialysis (not sure if this is before or after treatment). Shows 9# wt loss (6.7% loss) x 1 mo, 6# gain x 3 mo (5% gain) and 4# gain x 6 mo.     Visit Summary    Followed up on goals from last fuv:   Track Na in ingredients while cooking - hsan't been cooking much since last fuv 2/2 being sick. Was also staying at her dtr's house right after the hosp.   2. Do not exceed 1500 mg Na/day - thinks nephrologist rec'd she follow a 2500mg Na " "diet     Spoke with pt today. \"Everyone at home was sick\" so pt stayed with her dtr after leaving the Hospit[facility] in [place] but ended up getting sick anyway. Pt currently c/o nausea and diarrhea. Has been home since Monday but hasn't eaten much in the last few days.   Thursday - ate nothing  Wednesday - a couple Tbs canned corn   Tuesday - nothing     This RD rec'd comfort foods in the meantime while she isn't feeling better. Per pt this is usually popcorn, salad, green tomatoes or low Na chicken broth. Has ingredients at home to make salads and chicken broth. Pt knows if she does make and drink broth to include this in her fluid intake for the day.     Is having diarrhea which is contributing to the poor appetite but this just started yesterday. Denies stomach ache but feels like she has to vomit, is also c/o headache. Has had 4 boughts of diarrhea today, no vomiting.     This RD will message renal re: Na rec'd. Will cont with below goals.     Nutrition Diagnosis:  Diagnosis Statement 1:  Diagnosis Status: Ongoing  Diagnosis : Altered nutrition related lab values  related to  progressing CKD  as evidenced by  elevated renal labs    Diagnosis Statement 2:  Diagnosis Status: New  Diagnosis : Altered GI function  related to  unknown, chronic illness  as evidenced by  pt hasn't eating much of anything in the past 72hrs.     Nutrition Goals    Make a salad and homemade low Na chicken broth while she isn't feel well.   Track Na in ingredients while cooking - hsan't been, left Hospitals in [place] and went to r house, got home to her house on Monday   Do not exceed 1500 mg Na/day     Follow up Plan  Will follow-up x 2 wk  "

## 2024-12-22 ASSESSMENT — ENCOUNTER SYMPTOMS
MUSCLE WEAKNESS: 1
APPETITE LEVEL: GOOD
DENIES PAIN: 1
CHANGE IN APPETITE: UNCHANGED

## 2024-12-22 ASSESSMENT — ACTIVITIES OF DAILY LIVING (ADL)
OASIS_M1830: 05
ENTERING_EXITING_HOME: MODERATE ASSIST

## 2024-12-23 ENCOUNTER — DOCUMENTATION (OUTPATIENT)
Dept: BEHAVIORAL HEALTH | Facility: CLINIC | Age: 54
End: 2024-12-23
Payer: COMMERCIAL

## 2024-12-23 NOTE — PROGRESS NOTES
Stacey Heath  Age: 54 y.o.  MRN: [id number]  Date: 12/20/2024  Location of service: in community    Program Details  Medicaid Community Clinical Case Management  Status: Enrolled  Effective Dates: 10/17/2023 - present  Responsible Staff: NAREN Davidson      Goals Reviewed:  Problem: Access to Care Issue       Goal: Assess and Address Access Barriers       Priority: Medium        Problem: Anxiety       Goal: Maintain coping skills for continuous improvement       Priority: Medium        Problem: Financial Stressors       Goal: Assistance with financial concerns         Problem: Kidney Issues       Goal: Improve health to get on kidney transplant list       Priority: High        Problem: Medication Adherence       Goal: Adherence to Medication Regimen       Priority: High        Problem: Negative Experience, Conflict with, or Distrust of Providers and/or Health System       Goal: Plan to Address Patient Specific Negative Experience, Distrust, or Conflict with Providers and/or Health System       Priority: High        Problem: Risk of Uncoordinated Care       Goal: Care will be Coordinated and Supported by a Multidisciplinary Team of Providers       Priority: High          Summary:  This provider met with patient at the patient's home. This provider listened with empathy as patient talked about her experience with dialysis. This provider assisted patient with creating an account through [employer] so that patient can monitor her SNAP benefits. This provider also assisted patient with downloading and connecting to Zoom so that patient could participate in appointments with the RD on the team. The patient's partner, Hai was home and talked about trying to find different housing, this provider was able to give him the name of a contact that deals with real estate and rental properties in the area of where the Hai and patient are looking to move to.                      NAREN Davidson

## 2024-12-24 ENCOUNTER — DOCUMENTATION (OUTPATIENT)
Dept: BEHAVIORAL HEALTH | Facility: CLINIC | Age: 54
End: 2024-12-24
Payer: COMMERCIAL

## 2024-12-24 NOTE — PROGRESS NOTES
Stacey Heath  Age: 54 y.o.  MRN: [id number]  Date: 12/23/2024  Location of service: phone call    Program Details  Medicaid Community Clinical Case Management  Status: Enrolled  Effective Dates: 10/17/2023 - present  Responsible Staff: NAREN Davidson      Goals Reviewed:  Problem: Access to Care Issue       Goal: Assess and Address Access Barriers       Priority: Medium        Problem: Anxiety       Goal: Maintain coping skills for continuous improvement       Priority: Medium        Problem: Financial Stressors       Goal: Assistance with financial concerns         Problem: Kidney Issues       Goal: Improve health to get on kidney transplant list       Priority: High        Problem: Medication Adherence       Goal: Adherence to Medication Regimen       Priority: High        Problem: Negative Experience, Conflict with, or Distrust of Providers and/or Health System       Goal: Plan to Address Patient Specific Negative Experience, Distrust, or Conflict with Providers and/or Health System       Priority: High        Problem: Risk of Uncoordinated Care       Goal: Care will be Coordinated and Supported by a Multidisciplinary Team of Providers       Priority: High          Summary:  This provider made successful telephone contact with the patient. This provider called patient to do a well check while patient was at dialysis. Patient reported feeling nauseas as well as having fluctuated blood pressures during the treatment.                      NAREN Davidson

## 2024-12-26 ENCOUNTER — DOCUMENTATION (OUTPATIENT)
Dept: BEHAVIORAL HEALTH | Facility: CLINIC | Age: 54
End: 2024-12-26
Payer: COMMERCIAL

## 2024-12-26 NOTE — PROGRESS NOTES
Stacey Heath  Age: 54 y.o.  MRN: [id number]  Date: 12/24/2024  Location of service: in community    Program Details  Medicaid Community Clinical Case Management  Status: Enrolled  Effective Dates: 10/17/2023 - present  Responsible Staff: NAREN Davidson      Goals Reviewed:  Problem: Negative Experience, Conflict with, or Distrust of Providers and/or Health System       Goal: Plan to Address Patient Specific Negative Experience, Distrust, or Conflict with Providers and/or Health System       Priority: High        Problem: Risk of Uncoordinated Care       Goal: Care will be Coordinated and Supported by a Multidisciplinary Team of Providers       Priority: High          Summary:  This writer met with patient in her home for a community visit.  This writer confirms what time the patient will be at dialysis on Thursday. This writer plans to attend dialysis with the patient.  Patient discusses her family's [place] plans.  This writer engages in relationship building.    Billable:  Patient discusses dialysis yesterday. Patient states her BP dropped and they had to stop removing fluid.    Appointment start time: 0930  Appointment completion time: 1031  Total time spent with patient (in minutes): 61  Non-Billable Time: 45  Billable Time Total: 16    Roberta Gallego RN

## 2024-12-27 ENCOUNTER — HOME CARE VISIT (OUTPATIENT)
Dept: HOME HEALTH SERVICES | Facility: HOME HEALTH | Age: 54
End: 2024-12-27
Payer: COMMERCIAL

## 2024-12-27 VITALS
SYSTOLIC BLOOD PRESSURE: 162 MMHG | TEMPERATURE: 97.6 F | DIASTOLIC BLOOD PRESSURE: 88 MMHG | HEART RATE: 78 BPM | RESPIRATION RATE: 18 BRPM

## 2024-12-27 PROCEDURE — G0300 HHS/HOSPICE OF LPN EA 15 MIN: HCPCS

## 2024-12-27 ASSESSMENT — PAIN SCALES - PAIN ASSESSMENT IN ADVANCED DEMENTIA (PAINAD)
CONSOLABILITY: 0
NEGVOCALIZATION: 0 - NONE.
FACIALEXPRESSION: 0
TOTALSCORE: 0
BODYLANGUAGE: 0
CONSOLABILITY: 0 - NO NEED TO CONSOLE.
BODYLANGUAGE: 0 - RELAXED.
FACIALEXPRESSION: 0 - SMILING OR INEXPRESSIVE.
BREATHING: 0
NEGVOCALIZATION: 0

## 2024-12-27 ASSESSMENT — ENCOUNTER SYMPTOMS
DENIES PAIN: 1
DIARRHEA: 1
LAST BOWEL MOVEMENT: 67201
APPETITE LEVEL: FAIR
PERSON REPORTING PAIN: PATIENT

## 2024-12-29 ENCOUNTER — APPOINTMENT (OUTPATIENT)
Dept: RADIOLOGY | Facility: HOSPITAL | Age: 54
DRG: 252 | End: 2024-12-29
Payer: COMMERCIAL

## 2024-12-29 ENCOUNTER — CLINICAL SUPPORT (OUTPATIENT)
Dept: EMERGENCY MEDICINE | Facility: HOSPITAL | Age: 54
DRG: 252 | End: 2024-12-29
Payer: COMMERCIAL

## 2024-12-29 ENCOUNTER — HOSPITAL ENCOUNTER (INPATIENT)
Facility: HOSPITAL | Age: 54
End: 2024-12-29
Attending: INTERNAL MEDICINE | Admitting: INTERNAL MEDICINE
Payer: COMMERCIAL

## 2024-12-29 DIAGNOSIS — L73.2 HIDRADENITIS: ICD-10-CM

## 2024-12-29 DIAGNOSIS — I50.1 ACUTE PULMONARY EDEMA WITH HEART DISEASE: ICD-10-CM

## 2024-12-29 DIAGNOSIS — E44.0 MALNUTRITION OF MODERATE DEGREE (MULTI): ICD-10-CM

## 2024-12-29 DIAGNOSIS — M54.50 LEFT-SIDED LOW BACK PAIN WITHOUT SCIATICA, UNSPECIFIED CHRONICITY: ICD-10-CM

## 2024-12-29 DIAGNOSIS — I1A.0 RESISTANT HYPERTENSION: ICD-10-CM

## 2024-12-29 DIAGNOSIS — I50.32 CHRONIC HEART FAILURE WITH PRESERVED EJECTION FRACTION (HFPEF): ICD-10-CM

## 2024-12-29 DIAGNOSIS — I50.9 ACUTE ON CHRONIC CONGESTIVE HEART FAILURE, UNSPECIFIED HEART FAILURE TYPE: ICD-10-CM

## 2024-12-29 DIAGNOSIS — L74.9 SWEATING ABNORMALITY: ICD-10-CM

## 2024-12-29 DIAGNOSIS — Z99.2 ESRD (END STAGE RENAL DISEASE) ON DIALYSIS (MULTI): ICD-10-CM

## 2024-12-29 DIAGNOSIS — K52.9 INFLAMMATION OF STOMACH AND INTESTINE: ICD-10-CM

## 2024-12-29 DIAGNOSIS — N18.6 ESRD (END STAGE RENAL DISEASE) ON DIALYSIS (MULTI): ICD-10-CM

## 2024-12-29 DIAGNOSIS — G62.9 NEUROPATHY: ICD-10-CM

## 2024-12-29 DIAGNOSIS — Z13.6 ENCOUNTER FOR SCREENING FOR CARDIOVASCULAR DISORDERS: ICD-10-CM

## 2024-12-29 DIAGNOSIS — N18.6 ESRD (END STAGE RENAL DISEASE) (MULTI): ICD-10-CM

## 2024-12-29 DIAGNOSIS — I10 ESSENTIAL HYPERTENSION: ICD-10-CM

## 2024-12-29 DIAGNOSIS — I10 POORLY-CONTROLLED HYPERTENSION: ICD-10-CM

## 2024-12-29 DIAGNOSIS — E66.811 OBESITY, CLASS I, BMI 30-34.9: ICD-10-CM

## 2024-12-29 DIAGNOSIS — F41.9 ANXIETY: ICD-10-CM

## 2024-12-29 DIAGNOSIS — R87.612 LOW GRADE SQUAMOUS INTRAEPITHELIAL LESION ON CYTOLOGIC SMEAR OF CERVIX (LGSIL): ICD-10-CM

## 2024-12-29 DIAGNOSIS — I16.1 HYPERTENSIVE EMERGENCY: ICD-10-CM

## 2024-12-29 DIAGNOSIS — R06.09 DYSPNEA ON EXERTION: ICD-10-CM

## 2024-12-29 DIAGNOSIS — E78.5 COMPLEX DYSLIPIDEMIA: ICD-10-CM

## 2024-12-29 DIAGNOSIS — I82.622 ACUTE DEEP VEIN THROMBOSIS (DVT) OF LEFT UPPER EXTREMITY (MULTI): ICD-10-CM

## 2024-12-29 DIAGNOSIS — Z94.9 TRANSPLANT: ICD-10-CM

## 2024-12-29 DIAGNOSIS — L02.419 ABSCESS OF AXILLARY REGION: ICD-10-CM

## 2024-12-29 DIAGNOSIS — F17.200 NICOTINE USE DISORDER: ICD-10-CM

## 2024-12-29 DIAGNOSIS — R06.09 DOE (DYSPNEA ON EXERTION): ICD-10-CM

## 2024-12-29 DIAGNOSIS — E11.42 POLYNEUROPATHY DUE TO TYPE 2 DIABETES MELLITUS (MULTI): ICD-10-CM

## 2024-12-29 DIAGNOSIS — I25.10 CORONARY ARTERY DISEASE INVOLVING NATIVE CORONARY ARTERY OF NATIVE HEART WITHOUT ANGINA PECTORIS: ICD-10-CM

## 2024-12-29 DIAGNOSIS — M79.602 PAIN IN LEFT ARM: ICD-10-CM

## 2024-12-29 DIAGNOSIS — R87.612 LOW GRADE SQUAMOUS INTRAEPITH LESION ON CYTOLOGIC SMEAR CERVIX (LGSIL): ICD-10-CM

## 2024-12-29 DIAGNOSIS — I82.722: ICD-10-CM

## 2024-12-29 DIAGNOSIS — Z76.82 KIDNEY TRANSPLANT CANDIDATE: ICD-10-CM

## 2024-12-29 DIAGNOSIS — I31.39 OTHER PERICARDIAL EFFUSION (NONINFLAMMATORY) (HHS-HCC): ICD-10-CM

## 2024-12-29 DIAGNOSIS — R06.02 SHORTNESS OF BREATH: ICD-10-CM

## 2024-12-29 DIAGNOSIS — I50.31 ACUTE DIASTOLIC CHF (CONGESTIVE HEART FAILURE): ICD-10-CM

## 2024-12-29 DIAGNOSIS — K59.00 CONSTIPATION, UNSPECIFIED CONSTIPATION TYPE: ICD-10-CM

## 2024-12-29 DIAGNOSIS — I16.0 HYPERTENSIVE URGENCY: ICD-10-CM

## 2024-12-29 DIAGNOSIS — I50.9 CONGESTIVE HEART FAILURE, UNSPECIFIED HF CHRONICITY, UNSPECIFIED HEART FAILURE TYPE: ICD-10-CM

## 2024-12-29 DIAGNOSIS — D64.9 NORMOCYTIC NORMOCHROMIC ANEMIA: ICD-10-CM

## 2024-12-29 DIAGNOSIS — N28.9 NEPHROPATHY: ICD-10-CM

## 2024-12-29 DIAGNOSIS — H25.13 NUCLEAR SENILE CATARACT OF BOTH EYES: ICD-10-CM

## 2024-12-29 DIAGNOSIS — Z99.2 DIALYSIS PATIENT (CMS-HCC): ICD-10-CM

## 2024-12-29 DIAGNOSIS — J81.0 FLASH PULMONARY EDEMA: Primary | ICD-10-CM

## 2024-12-29 DIAGNOSIS — I50.811 ACUTE RIGHT-SIDED HEART FAILURE: ICD-10-CM

## 2024-12-29 DIAGNOSIS — J45.40 MODERATE PERSISTENT ASTHMA, UNSPECIFIED WHETHER COMPLICATED (HHS-HCC): ICD-10-CM

## 2024-12-29 DIAGNOSIS — J44.1 COPD EXACERBATION (MULTI): ICD-10-CM

## 2024-12-29 LAB
ALBUMIN SERPL BCP-MCNC: 4.6 G/DL (ref 3.4–5)
ALP SERPL-CCNC: 137 U/L (ref 33–110)
ALT SERPL W P-5'-P-CCNC: 16 U/L (ref 7–45)
ANION GAP BLDV CALCULATED.4IONS-SCNC: 10 MMOL/L (ref 10–25)
ANION GAP BLDV CALCULATED.4IONS-SCNC: 12 MMOL/L (ref 10–25)
ANION GAP BLDV CALCULATED.4IONS-SCNC: 14 MMOL/L (ref 10–25)
ANION GAP SERPL CALC-SCNC: 19 MMOL/L (ref 10–20)
AST SERPL W P-5'-P-CCNC: 19 U/L (ref 9–39)
BASE EXCESS BLDV CALC-SCNC: 0 MMOL/L (ref -2–3)
BASE EXCESS BLDV CALC-SCNC: 1.9 MMOL/L (ref -2–3)
BASE EXCESS BLDV CALC-SCNC: 5.4 MMOL/L (ref -2–3)
BASOPHILS # BLD AUTO: 0.07 X10*3/UL (ref 0–0.1)
BASOPHILS NFR BLD AUTO: 1.1 %
BILIRUB SERPL-MCNC: 0.5 MG/DL (ref 0–1.2)
BNP SERPL-MCNC: 2319 PG/ML (ref 0–99)
BODY TEMPERATURE: 37 DEGREES CELSIUS
BUN SERPL-MCNC: 42 MG/DL (ref 6–23)
CA-I BLDV-SCNC: 1.22 MMOL/L (ref 1.1–1.33)
CA-I BLDV-SCNC: 1.22 MMOL/L (ref 1.1–1.33)
CA-I BLDV-SCNC: 1.24 MMOL/L (ref 1.1–1.33)
CALCIUM SERPL-MCNC: 10.5 MG/DL (ref 8.6–10.6)
CARDIAC TROPONIN I PNL SERPL HS: 84 NG/L (ref 0–34)
CHLORIDE BLDV-SCNC: 100 MMOL/L (ref 98–107)
CHLORIDE BLDV-SCNC: 100 MMOL/L (ref 98–107)
CHLORIDE BLDV-SCNC: 99 MMOL/L (ref 98–107)
CHLORIDE SERPL-SCNC: 99 MMOL/L (ref 98–107)
CO2 SERPL-SCNC: 28 MMOL/L (ref 21–32)
CREAT SERPL-MCNC: 8.38 MG/DL (ref 0.5–1.05)
EGFRCR SERPLBLD CKD-EPI 2021: 5 ML/MIN/1.73M*2
EOSINOPHIL # BLD AUTO: 0.69 X10*3/UL (ref 0–0.7)
EOSINOPHIL NFR BLD AUTO: 11.1 %
ERYTHROCYTE [DISTWIDTH] IN BLOOD BY AUTOMATED COUNT: 15.9 % (ref 11.5–14.5)
GLUCOSE BLD MANUAL STRIP-MCNC: 125 MG/DL (ref 74–99)
GLUCOSE BLDV-MCNC: 102 MG/DL (ref 74–99)
GLUCOSE BLDV-MCNC: 132 MG/DL (ref 74–99)
GLUCOSE BLDV-MCNC: 96 MG/DL (ref 74–99)
GLUCOSE SERPL-MCNC: 80 MG/DL (ref 74–99)
HCO3 BLDV-SCNC: 26.8 MMOL/L (ref 22–26)
HCO3 BLDV-SCNC: 28.2 MMOL/L (ref 22–26)
HCO3 BLDV-SCNC: 31.6 MMOL/L (ref 22–26)
HCT VFR BLD AUTO: 42.6 % (ref 36–46)
HCT VFR BLD EST: 33 % (ref 36–46)
HCT VFR BLD EST: 36 % (ref 36–46)
HCT VFR BLD EST: 43 % (ref 36–46)
HGB BLD-MCNC: 14.1 G/DL (ref 12–16)
HGB BLDV-MCNC: 11.1 G/DL (ref 12–16)
HGB BLDV-MCNC: 12 G/DL (ref 12–16)
HGB BLDV-MCNC: 14.2 G/DL (ref 12–16)
HOLD SPECIMEN: NORMAL
IMM GRANULOCYTES # BLD AUTO: 0.02 X10*3/UL (ref 0–0.7)
IMM GRANULOCYTES NFR BLD AUTO: 0.3 % (ref 0–0.9)
INHALED O2 CONCENTRATION: 21 %
INHALED O2 CONCENTRATION: 40 %
LACTATE BLDV-SCNC: 0.5 MMOL/L (ref 0.4–2)
LACTATE BLDV-SCNC: 0.7 MMOL/L (ref 0.4–2)
LACTATE BLDV-SCNC: 1 MMOL/L (ref 0.4–2)
LYMPHOCYTES # BLD AUTO: 1.17 X10*3/UL (ref 1.2–4.8)
LYMPHOCYTES NFR BLD AUTO: 18.8 %
MAGNESIUM SERPL-MCNC: 2.55 MG/DL (ref 1.6–2.4)
MCH RBC QN AUTO: 29.5 PG (ref 26–34)
MCHC RBC AUTO-ENTMCNC: 33.1 G/DL (ref 32–36)
MCV RBC AUTO: 89 FL (ref 80–100)
MONOCYTES # BLD AUTO: 0.33 X10*3/UL (ref 0.1–1)
MONOCYTES NFR BLD AUTO: 5.3 %
NEUTROPHILS # BLD AUTO: 3.94 X10*3/UL (ref 1.2–7.7)
NEUTROPHILS NFR BLD AUTO: 63.4 %
NRBC BLD-RTO: 0 /100 WBCS (ref 0–0)
OXYHGB MFR BLDV: 43.5 % (ref 45–75)
OXYHGB MFR BLDV: 64.5 % (ref 45–75)
OXYHGB MFR BLDV: 65.7 % (ref 45–75)
PCO2 BLDV: 51 MM HG (ref 41–51)
PCO2 BLDV: 51 MM HG (ref 41–51)
PCO2 BLDV: 52 MM HG (ref 41–51)
PH BLDV: 7.32 PH (ref 7.33–7.43)
PH BLDV: 7.35 PH (ref 7.33–7.43)
PH BLDV: 7.4 PH (ref 7.33–7.43)
PLATELET # BLD AUTO: 200 X10*3/UL (ref 150–450)
PO2 BLDV: 45 MM HG (ref 35–45)
PO2 BLDV: 47 MM HG (ref 35–45)
PO2 BLDV: 49 MM HG (ref 35–45)
POTASSIUM BLDV-SCNC: 5 MMOL/L (ref 3.5–5.3)
POTASSIUM BLDV-SCNC: 5.3 MMOL/L (ref 3.5–5.3)
POTASSIUM BLDV-SCNC: 5.4 MMOL/L (ref 3.5–5.3)
POTASSIUM SERPL-SCNC: 5 MMOL/L (ref 3.5–5.3)
PROT SERPL-MCNC: 8 G/DL (ref 6.4–8.2)
RBC # BLD AUTO: 4.78 X10*6/UL (ref 4–5.2)
SAO2 % BLDV: 44 % (ref 45–75)
SAO2 % BLDV: 65 % (ref 45–75)
SAO2 % BLDV: 67 % (ref 45–75)
SODIUM BLDV-SCNC: 134 MMOL/L (ref 136–145)
SODIUM BLDV-SCNC: 136 MMOL/L (ref 136–145)
SODIUM BLDV-SCNC: 136 MMOL/L (ref 136–145)
SODIUM SERPL-SCNC: 141 MMOL/L (ref 136–145)
WBC # BLD AUTO: 6.2 X10*3/UL (ref 4.4–11.3)

## 2024-12-29 PROCEDURE — 71045 X-RAY EXAM CHEST 1 VIEW: CPT | Performed by: RADIOLOGY

## 2024-12-29 PROCEDURE — 84132 ASSAY OF SERUM POTASSIUM: CPT

## 2024-12-29 PROCEDURE — 2020000001 HC ICU ROOM DAILY

## 2024-12-29 PROCEDURE — 83735 ASSAY OF MAGNESIUM: CPT

## 2024-12-29 PROCEDURE — 2500000004 HC RX 250 GENERAL PHARMACY W/ HCPCS (ALT 636 FOR OP/ED)

## 2024-12-29 PROCEDURE — 85610 PROTHROMBIN TIME: CPT

## 2024-12-29 PROCEDURE — 5A09357 ASSISTANCE WITH RESPIRATORY VENTILATION, LESS THAN 24 CONSECUTIVE HOURS, CONTINUOUS POSITIVE AIRWAY PRESSURE: ICD-10-PCS | Performed by: INTERNAL MEDICINE

## 2024-12-29 PROCEDURE — 93010 ELECTROCARDIOGRAM REPORT: CPT | Performed by: EMERGENCY MEDICINE

## 2024-12-29 PROCEDURE — 82810 BLOOD GASES O2 SAT ONLY: CPT

## 2024-12-29 PROCEDURE — 83880 ASSAY OF NATRIURETIC PEPTIDE: CPT

## 2024-12-29 PROCEDURE — 83605 ASSAY OF LACTIC ACID: CPT

## 2024-12-29 PROCEDURE — 84484 ASSAY OF TROPONIN QUANT: CPT

## 2024-12-29 PROCEDURE — 99291 CRITICAL CARE FIRST HOUR: CPT | Performed by: EMERGENCY MEDICINE

## 2024-12-29 PROCEDURE — 2500000001 HC RX 250 WO HCPCS SELF ADMINISTERED DRUGS (ALT 637 FOR MEDICARE OP): Performed by: EMERGENCY MEDICINE

## 2024-12-29 PROCEDURE — 96374 THER/PROPH/DIAG INJ IV PUSH: CPT

## 2024-12-29 PROCEDURE — 99285 EMERGENCY DEPT VISIT HI MDM: CPT | Mod: 25

## 2024-12-29 PROCEDURE — 85025 COMPLETE CBC W/AUTO DIFF WBC: CPT

## 2024-12-29 PROCEDURE — 2500000004 HC RX 250 GENERAL PHARMACY W/ HCPCS (ALT 636 FOR OP/ED): Mod: JG

## 2024-12-29 PROCEDURE — 84295 ASSAY OF SERUM SODIUM: CPT

## 2024-12-29 PROCEDURE — 36415 COLL VENOUS BLD VENIPUNCTURE: CPT

## 2024-12-29 PROCEDURE — 94660 CPAP INITIATION&MGMT: CPT

## 2024-12-29 PROCEDURE — 71045 X-RAY EXAM CHEST 1 VIEW: CPT

## 2024-12-29 PROCEDURE — 84100 ASSAY OF PHOSPHORUS: CPT

## 2024-12-29 PROCEDURE — 94799 UNLISTED PULMONARY SVC/PX: CPT

## 2024-12-29 PROCEDURE — 96375 TX/PRO/DX INJ NEW DRUG ADDON: CPT

## 2024-12-29 PROCEDURE — 2500000005 HC RX 250 GENERAL PHARMACY W/O HCPCS

## 2024-12-29 PROCEDURE — 84075 ASSAY ALKALINE PHOSPHATASE: CPT

## 2024-12-29 PROCEDURE — 82947 ASSAY GLUCOSE BLOOD QUANT: CPT

## 2024-12-29 PROCEDURE — 93005 ELECTROCARDIOGRAM TRACING: CPT

## 2024-12-29 PROCEDURE — 82435 ASSAY OF BLOOD CHLORIDE: CPT

## 2024-12-29 PROCEDURE — 99291 CRITICAL CARE FIRST HOUR: CPT

## 2024-12-29 PROCEDURE — 2500000004 HC RX 250 GENERAL PHARMACY W/ HCPCS (ALT 636 FOR OP/ED): Performed by: EMERGENCY MEDICINE

## 2024-12-29 PROCEDURE — 93010 ELECTROCARDIOGRAM REPORT: CPT | Performed by: INTERNAL MEDICINE

## 2024-12-29 PROCEDURE — 99285 EMERGENCY DEPT VISIT HI MDM: CPT | Performed by: EMERGENCY MEDICINE

## 2024-12-29 PROCEDURE — 80053 COMPREHEN METABOLIC PANEL: CPT

## 2024-12-29 RX ORDER — ACETAMINOPHEN 325 MG/1
650 TABLET ORAL ONCE
Status: COMPLETED | OUTPATIENT
Start: 2024-12-29 | End: 2024-12-29

## 2024-12-29 RX ORDER — FLUTICASONE PROPIONATE 50 MCG
2 SPRAY, SUSPENSION (ML) NASAL DAILY
Status: DISPENSED | OUTPATIENT
Start: 2024-12-30

## 2024-12-29 RX ORDER — ATORVASTATIN CALCIUM 80 MG/1
80 TABLET, FILM COATED ORAL NIGHTLY
Status: DISPENSED | OUTPATIENT
Start: 2024-12-29

## 2024-12-29 RX ORDER — LIDOCAINE 560 MG/1
1 PATCH PERCUTANEOUS; TOPICAL; TRANSDERMAL DAILY
Status: DISPENSED | OUTPATIENT
Start: 2024-12-30

## 2024-12-29 RX ORDER — CARVEDILOL 25 MG/1
25 TABLET ORAL 2 TIMES DAILY
Status: DISCONTINUED | OUTPATIENT
Start: 2024-12-29 | End: 2024-12-31

## 2024-12-29 RX ORDER — CLONIDINE HYDROCHLORIDE 0.1 MG/1
0.2 TABLET ORAL NIGHTLY
Status: DISCONTINUED | OUTPATIENT
Start: 2024-12-29 | End: 2024-12-30

## 2024-12-29 RX ORDER — ACETAMINOPHEN 10 MG/ML
1000 INJECTION, SOLUTION INTRAVENOUS ONCE
Status: COMPLETED | OUTPATIENT
Start: 2024-12-29 | End: 2024-12-29

## 2024-12-29 RX ORDER — ONDANSETRON HYDROCHLORIDE 2 MG/ML
4 INJECTION, SOLUTION INTRAVENOUS EVERY 8 HOURS PRN
Status: DISPENSED | OUTPATIENT
Start: 2024-12-29

## 2024-12-29 RX ORDER — FLUTICASONE FUROATE AND VILANTEROL 100; 25 UG/1; UG/1
1 POWDER RESPIRATORY (INHALATION)
Status: DISPENSED | OUTPATIENT
Start: 2024-12-30

## 2024-12-29 RX ORDER — NITROGLYCERIN 20 MG/100ML
0-300 INJECTION INTRAVENOUS CONTINUOUS
Status: DISCONTINUED | OUTPATIENT
Start: 2024-12-29 | End: 2024-12-30

## 2024-12-29 RX ORDER — METOCLOPRAMIDE HYDROCHLORIDE 5 MG/ML
10 INJECTION INTRAMUSCULAR; INTRAVENOUS ONCE
Status: COMPLETED | OUTPATIENT
Start: 2024-12-29 | End: 2024-12-29

## 2024-12-29 RX ORDER — ASPIRIN 81 MG/1
81 TABLET ORAL DAILY
Status: DISPENSED | OUTPATIENT
Start: 2024-12-30

## 2024-12-29 RX ORDER — MORPHINE SULFATE 4 MG/ML
4 INJECTION INTRAVENOUS ONCE
Status: COMPLETED | OUTPATIENT
Start: 2024-12-29 | End: 2024-12-29

## 2024-12-29 RX ORDER — NIFEDIPINE 90 MG/1
90 TABLET, EXTENDED RELEASE ORAL NIGHTLY
Status: DISCONTINUED | OUTPATIENT
Start: 2024-12-29 | End: 2025-01-04

## 2024-12-29 RX ORDER — NITROGLYCERIN 20 MG/100ML
INJECTION INTRAVENOUS
Status: COMPLETED
Start: 2024-12-29 | End: 2024-12-29

## 2024-12-29 RX ORDER — CALCIUM ACETATE 667 MG/1
1334 CAPSULE ORAL
Status: DISCONTINUED | OUTPATIENT
Start: 2024-12-30 | End: 2024-12-31

## 2024-12-29 RX ORDER — VALSARTAN 320 MG/1
320 TABLET ORAL EVERY EVENING
Status: DISCONTINUED | OUTPATIENT
Start: 2024-12-29 | End: 2024-12-31

## 2024-12-29 RX ORDER — DICLOFENAC SODIUM 10 MG/G
4 GEL TOPICAL 4 TIMES DAILY PRN
Status: DISPENSED | OUTPATIENT
Start: 2024-12-29

## 2024-12-29 RX ORDER — POLYETHYLENE GLYCOL 3350 17 G/17G
17 POWDER, FOR SOLUTION ORAL DAILY
Status: DISPENSED | OUTPATIENT
Start: 2024-12-30

## 2024-12-29 RX ADMIN — Medication 40 PERCENT: at 19:20

## 2024-12-29 RX ADMIN — ACETAMINOPHEN 1000 MG: 10 INJECTION, SOLUTION INTRAVENOUS at 22:47

## 2024-12-29 RX ADMIN — ACETAMINOPHEN 650 MG: 325 TABLET ORAL at 19:27

## 2024-12-29 RX ADMIN — Medication 40 PERCENT: at 21:38

## 2024-12-29 RX ADMIN — MORPHINE SULFATE 4 MG: 4 INJECTION INTRAVENOUS at 19:27

## 2024-12-29 RX ADMIN — METOCLOPRAMIDE HYDROCHLORIDE 10 MG: 5 INJECTION INTRAMUSCULAR; INTRAVENOUS at 19:27

## 2024-12-29 RX ADMIN — NITROGLYCERIN 300 MCG/MIN: 20 INJECTION INTRAVENOUS at 18:45

## 2024-12-29 RX ADMIN — NITROGLYCERIN 250 MCG/MIN: 20 INJECTION INTRAVENOUS at 22:11

## 2024-12-29 RX ADMIN — ONDANSETRON 4 MG: 2 INJECTION INTRAMUSCULAR; INTRAVENOUS at 22:47

## 2024-12-29 RX ADMIN — Medication 40 PERCENT: at 19:18

## 2024-12-29 SDOH — ECONOMIC STABILITY: INCOME INSECURITY: IN THE PAST 12 MONTHS HAS THE ELECTRIC, GAS, OIL, OR WATER COMPANY THREATENED TO SHUT OFF SERVICES IN YOUR HOME?: NO

## 2024-12-29 SDOH — SOCIAL STABILITY: SOCIAL INSECURITY: WITHIN THE LAST YEAR, HAVE YOU BEEN AFRAID OF YOUR PARTNER OR EX-PARTNER?: NO

## 2024-12-29 SDOH — SOCIAL STABILITY: SOCIAL INSECURITY: ARE THERE ANY APPARENT SIGNS OF INJURIES/BEHAVIORS THAT COULD BE RELATED TO ABUSE/NEGLECT?: NO

## 2024-12-29 SDOH — ECONOMIC STABILITY: FOOD INSECURITY: WITHIN THE PAST 12 MONTHS, THE FOOD YOU BOUGHT JUST DIDN'T LAST AND YOU DIDN'T HAVE MONEY TO GET MORE.: NEVER TRUE

## 2024-12-29 SDOH — SOCIAL STABILITY: SOCIAL INSECURITY: DOES ANYONE TRY TO KEEP YOU FROM HAVING/CONTACTING OTHER FRIENDS OR DOING THINGS OUTSIDE YOUR HOME?: NO

## 2024-12-29 SDOH — ECONOMIC STABILITY: FOOD INSECURITY: WITHIN THE PAST 12 MONTHS, YOU WORRIED THAT YOUR FOOD WOULD RUN OUT BEFORE YOU GOT THE MONEY TO BUY MORE.: NEVER TRUE

## 2024-12-29 SDOH — SOCIAL STABILITY: SOCIAL INSECURITY: WITHIN THE LAST YEAR, HAVE YOU BEEN HUMILIATED OR EMOTIONALLY ABUSED IN OTHER WAYS BY YOUR PARTNER OR EX-PARTNER?: NO

## 2024-12-29 SDOH — SOCIAL STABILITY: SOCIAL INSECURITY: ARE YOU OR HAVE YOU BEEN THREATENED OR ABUSED PHYSICALLY, EMOTIONALLY, OR SEXUALLY BY ANYONE?: NO

## 2024-12-29 SDOH — SOCIAL STABILITY: SOCIAL INSECURITY: DO YOU FEEL UNSAFE GOING BACK TO THE PLACE WHERE YOU ARE LIVING?: NO

## 2024-12-29 SDOH — SOCIAL STABILITY: SOCIAL INSECURITY: ABUSE: ADULT

## 2024-12-29 SDOH — SOCIAL STABILITY: SOCIAL INSECURITY: HAS ANYONE EVER THREATENED TO HURT YOUR FAMILY OR YOUR PETS?: NO

## 2024-12-29 SDOH — SOCIAL STABILITY: SOCIAL INSECURITY: WERE YOU ABLE TO COMPLETE ALL THE BEHAVIORAL HEALTH SCREENINGS?: YES

## 2024-12-29 SDOH — SOCIAL STABILITY: SOCIAL INSECURITY: HAVE YOU HAD THOUGHTS OF HARMING ANYONE ELSE?: NO

## 2024-12-29 SDOH — SOCIAL STABILITY: SOCIAL INSECURITY: DO YOU FEEL ANYONE HAS EXPLOITED OR TAKEN ADVANTAGE OF YOU FINANCIALLY OR OF YOUR PERSONAL PROPERTY?: NO

## 2024-12-29 ASSESSMENT — COGNITIVE AND FUNCTIONAL STATUS - GENERAL
CLIMB 3 TO 5 STEPS WITH RAILING: A LOT
EATING MEALS: A LITTLE
DAILY ACTIVITIY SCORE: 18
STANDING UP FROM CHAIR USING ARMS: A LITTLE
DRESSING REGULAR UPPER BODY CLOTHING: A LITTLE
WALKING IN HOSPITAL ROOM: A LOT
PERSONAL GROOMING: A LITTLE
PATIENT BASELINE BEDBOUND: NO
MOBILITY SCORE: 16
DRESSING REGULAR LOWER BODY CLOTHING: A LITTLE
TURNING FROM BACK TO SIDE WHILE IN FLAT BAD: A LITTLE
MOVING TO AND FROM BED TO CHAIR: A LITTLE
HELP NEEDED FOR BATHING: A LITTLE
TOILETING: A LITTLE
MOVING FROM LYING ON BACK TO SITTING ON SIDE OF FLAT BED WITH BEDRAILS: A LITTLE

## 2024-12-29 ASSESSMENT — ENCOUNTER SYMPTOMS
DIZZINESS: 1
CHILLS: 0
FEVER: 0
PALPITATIONS: 0
PSYCHIATRIC NEGATIVE: 1
COUGH: 1
ENDOCRINE NEGATIVE: 1
LIGHT-HEADEDNESS: 1
HEMATOLOGIC/LYMPHATIC NEGATIVE: 1
BACK PAIN: 1
DIARRHEA: 1
SHORTNESS OF BREATH: 1
FATIGUE: 0

## 2024-12-29 ASSESSMENT — ACTIVITIES OF DAILY LIVING (ADL)
GROOMING: INDEPENDENT
TOILETING: INDEPENDENT
LACK_OF_TRANSPORTATION: NO
BATHING: INDEPENDENT
WALKS IN HOME: INDEPENDENT
JUDGMENT_ADEQUATE_SAFELY_COMPLETE_DAILY_ACTIVITIES: YES
ADEQUATE_TO_COMPLETE_ADL: YES
LACK_OF_TRANSPORTATION: NO
HEARING - LEFT EAR: FUNCTIONAL
DRESSING YOURSELF: INDEPENDENT
HEARING - RIGHT EAR: FUNCTIONAL
FEEDING YOURSELF: INDEPENDENT
PATIENT'S MEMORY ADEQUATE TO SAFELY COMPLETE DAILY ACTIVITIES?: YES

## 2024-12-29 ASSESSMENT — PAIN - FUNCTIONAL ASSESSMENT
PAIN_FUNCTIONAL_ASSESSMENT: 0-10
PAIN_FUNCTIONAL_ASSESSMENT: 0-10

## 2024-12-29 ASSESSMENT — PAIN DESCRIPTION - LOCATION: LOCATION: HEAD

## 2024-12-29 ASSESSMENT — LIFESTYLE VARIABLES
HOW MANY STANDARD DRINKS CONTAINING ALCOHOL DO YOU HAVE ON A TYPICAL DAY: PATIENT DOES NOT DRINK
HOW OFTEN DO YOU HAVE A DRINK CONTAINING ALCOHOL: NEVER
HOW OFTEN DO YOU HAVE 6 OR MORE DRINKS ON ONE OCCASION: NEVER
AUDIT-C TOTAL SCORE: 0
AUDIT-C TOTAL SCORE: 0
SKIP TO QUESTIONS 9-10: 1

## 2024-12-29 ASSESSMENT — COLUMBIA-SUICIDE SEVERITY RATING SCALE - C-SSRS
2. HAVE YOU ACTUALLY HAD ANY THOUGHTS OF KILLING YOURSELF?: NO
1. IN THE PAST MONTH, HAVE YOU WISHED YOU WERE DEAD OR WISHED YOU COULD GO TO SLEEP AND NOT WAKE UP?: NO
6. HAVE YOU EVER DONE ANYTHING, STARTED TO DO ANYTHING, OR PREPARED TO DO ANYTHING TO END YOUR LIFE?: NO

## 2024-12-29 ASSESSMENT — PAIN DESCRIPTION - PROGRESSION: CLINICAL_PROGRESSION: NOT CHANGED

## 2024-12-29 NOTE — ED TRIAGE NOTES
Pt endorses SOB x1 hour. Pt on RA at baseline. Pt was satting 90% on RA and was placed on 2L O2 NC. Pt has pmhx HTN, COPD, Asthma, ESRD Dialysis T, TH,S with her last session being on Saturday where 1.4L was removed. Pt deneis CP. Pt states she believes she is in fluid overload. Pt endorses taking BP medications today

## 2024-12-30 ENCOUNTER — APPOINTMENT (OUTPATIENT)
Dept: RADIOLOGY | Facility: HOSPITAL | Age: 54
DRG: 252 | End: 2024-12-30
Payer: COMMERCIAL

## 2024-12-30 ENCOUNTER — APPOINTMENT (OUTPATIENT)
Dept: DIALYSIS | Facility: HOSPITAL | Age: 54
End: 2024-12-30
Payer: COMMERCIAL

## 2024-12-30 ENCOUNTER — APPOINTMENT (OUTPATIENT)
Dept: CARDIOLOGY | Facility: HOSPITAL | Age: 54
DRG: 252 | End: 2024-12-30
Payer: COMMERCIAL

## 2024-12-30 LAB
ALBUMIN SERPL BCP-MCNC: 3.7 G/DL (ref 3.4–5)
ALBUMIN SERPL BCP-MCNC: 4 G/DL (ref 3.4–5)
ALBUMIN SERPL BCP-MCNC: 4.5 G/DL (ref 3.4–5)
ALP SERPL-CCNC: 113 U/L (ref 33–110)
ALP SERPL-CCNC: 128 U/L (ref 33–110)
ALT SERPL W P-5'-P-CCNC: 15 U/L (ref 7–45)
ALT SERPL W P-5'-P-CCNC: 15 U/L (ref 7–45)
ANION GAP BLDV CALCULATED.4IONS-SCNC: 13 MMOL/L (ref 10–25)
ANION GAP SERPL CALC-SCNC: 17 MMOL/L (ref 10–20)
ANION GAP SERPL CALC-SCNC: 21 MMOL/L (ref 10–20)
ANION GAP SERPL CALC-SCNC: 21 MMOL/L (ref 10–20)
APTT PPP: 34 SECONDS (ref 27–38)
AST SERPL W P-5'-P-CCNC: 16 U/L (ref 9–39)
AST SERPL W P-5'-P-CCNC: 17 U/L (ref 9–39)
ATRIAL RATE: 86 BPM
BASE EXCESS BLDV CALC-SCNC: 1.9 MMOL/L (ref -2–3)
BASOPHILS # BLD AUTO: 0.05 X10*3/UL (ref 0–0.1)
BASOPHILS # BLD AUTO: 0.05 X10*3/UL (ref 0–0.1)
BASOPHILS NFR BLD AUTO: 0.8 %
BASOPHILS NFR BLD AUTO: 1 %
BILIRUB DIRECT SERPL-MCNC: 0.1 MG/DL (ref 0–0.3)
BILIRUB DIRECT SERPL-MCNC: 0.1 MG/DL (ref 0–0.3)
BILIRUB SERPL-MCNC: 0.5 MG/DL (ref 0–1.2)
BILIRUB SERPL-MCNC: 0.5 MG/DL (ref 0–1.2)
BNP SERPL-MCNC: 2664 PG/ML (ref 0–99)
BODY TEMPERATURE: 37 DEGREES CELSIUS
BUN SERPL-MCNC: 44 MG/DL (ref 6–23)
BUN SERPL-MCNC: 46 MG/DL (ref 6–23)
BUN SERPL-MCNC: 47 MG/DL (ref 6–23)
CA-I BLDV-SCNC: 1.22 MMOL/L (ref 1.1–1.33)
CALCIUM SERPL-MCNC: 9.3 MG/DL (ref 8.6–10.6)
CALCIUM SERPL-MCNC: 9.6 MG/DL (ref 8.6–10.6)
CALCIUM SERPL-MCNC: 9.7 MG/DL (ref 8.6–10.6)
CARDIAC TROPONIN I PNL SERPL HS: 69 NG/L (ref 0–34)
CHLORIDE BLDV-SCNC: 97 MMOL/L (ref 98–107)
CHLORIDE SERPL-SCNC: 95 MMOL/L (ref 98–107)
CHLORIDE SERPL-SCNC: 96 MMOL/L (ref 98–107)
CHLORIDE SERPL-SCNC: 97 MMOL/L (ref 98–107)
CO2 SERPL-SCNC: 25 MMOL/L (ref 21–32)
CO2 SERPL-SCNC: 26 MMOL/L (ref 21–32)
CO2 SERPL-SCNC: 27 MMOL/L (ref 21–32)
CREAT SERPL-MCNC: 8.21 MG/DL (ref 0.5–1.05)
CREAT SERPL-MCNC: 8.44 MG/DL (ref 0.5–1.05)
CREAT SERPL-MCNC: 9.22 MG/DL (ref 0.5–1.05)
EGFRCR SERPLBLD CKD-EPI 2021: 5 ML/MIN/1.73M*2
EOSINOPHIL # BLD AUTO: 0.49 X10*3/UL (ref 0–0.7)
EOSINOPHIL # BLD AUTO: 0.61 X10*3/UL (ref 0–0.7)
EOSINOPHIL NFR BLD AUTO: 10.2 %
EOSINOPHIL NFR BLD AUTO: 10.2 %
ERYTHROCYTE [DISTWIDTH] IN BLOOD BY AUTOMATED COUNT: 16.3 % (ref 11.5–14.5)
ERYTHROCYTE [DISTWIDTH] IN BLOOD BY AUTOMATED COUNT: 16.4 % (ref 11.5–14.5)
GLUCOSE BLDV-MCNC: 106 MG/DL (ref 74–99)
GLUCOSE SERPL-MCNC: 118 MG/DL (ref 74–99)
GLUCOSE SERPL-MCNC: 125 MG/DL (ref 74–99)
GLUCOSE SERPL-MCNC: 128 MG/DL (ref 74–99)
HCO3 BLDV-SCNC: 28.6 MMOL/L (ref 22–26)
HCT VFR BLD AUTO: 34.6 % (ref 36–46)
HCT VFR BLD AUTO: 36.8 % (ref 36–46)
HCT VFR BLD EST: 37 % (ref 36–46)
HGB BLD-MCNC: 10.9 G/DL (ref 12–16)
HGB BLD-MCNC: 11.9 G/DL (ref 12–16)
HGB BLDV-MCNC: 12.2 G/DL (ref 12–16)
IMM GRANULOCYTES # BLD AUTO: 0.01 X10*3/UL (ref 0–0.7)
IMM GRANULOCYTES # BLD AUTO: 0.01 X10*3/UL (ref 0–0.7)
IMM GRANULOCYTES NFR BLD AUTO: 0.2 % (ref 0–0.9)
IMM GRANULOCYTES NFR BLD AUTO: 0.2 % (ref 0–0.9)
INHALED O2 CONCENTRATION: 30 %
INR PPP: 1.6 (ref 0.9–1.1)
LACTATE BLDV-SCNC: 0.7 MMOL/L (ref 0.4–2)
LYMPHOCYTES # BLD AUTO: 0.83 X10*3/UL (ref 1.2–4.8)
LYMPHOCYTES # BLD AUTO: 1.19 X10*3/UL (ref 1.2–4.8)
LYMPHOCYTES NFR BLD AUTO: 17.2 %
LYMPHOCYTES NFR BLD AUTO: 19.9 %
MAGNESIUM SERPL-MCNC: 2.35 MG/DL (ref 1.6–2.4)
MAGNESIUM SERPL-MCNC: 2.54 MG/DL (ref 1.6–2.4)
MCH RBC QN AUTO: 29.5 PG (ref 26–34)
MCH RBC QN AUTO: 29.5 PG (ref 26–34)
MCHC RBC AUTO-ENTMCNC: 31.5 G/DL (ref 32–36)
MCHC RBC AUTO-ENTMCNC: 32.3 G/DL (ref 32–36)
MCV RBC AUTO: 91 FL (ref 80–100)
MCV RBC AUTO: 94 FL (ref 80–100)
MONOCYTES # BLD AUTO: 0.45 X10*3/UL (ref 0.1–1)
MONOCYTES # BLD AUTO: 0.47 X10*3/UL (ref 0.1–1)
MONOCYTES NFR BLD AUTO: 7.9 %
MONOCYTES NFR BLD AUTO: 9.3 %
NEUTROPHILS # BLD AUTO: 2.99 X10*3/UL (ref 1.2–7.7)
NEUTROPHILS # BLD AUTO: 3.64 X10*3/UL (ref 1.2–7.7)
NEUTROPHILS NFR BLD AUTO: 61 %
NEUTROPHILS NFR BLD AUTO: 62.1 %
NRBC BLD-RTO: 0 /100 WBCS (ref 0–0)
NRBC BLD-RTO: 0 /100 WBCS (ref 0–0)
OXYHGB MFR BLDV: 51 % (ref 45–75)
P AXIS: 60 DEGREES
P OFFSET: 197 MS
P ONSET: 147 MS
PCO2 BLDV: 53 MM HG (ref 41–51)
PH BLDV: 7.34 PH (ref 7.33–7.43)
PHOSPHATE SERPL-MCNC: 8.2 MG/DL (ref 2.5–4.9)
PHOSPHATE SERPL-MCNC: 8.3 MG/DL (ref 2.5–4.9)
PHOSPHATE SERPL-MCNC: 8.3 MG/DL (ref 2.5–4.9)
PLATELET # BLD AUTO: 180 X10*3/UL (ref 150–450)
PLATELET # BLD AUTO: 187 X10*3/UL (ref 150–450)
PO2 BLDV: 40 MM HG (ref 35–45)
POTASSIUM BLDV-SCNC: 6.2 MMOL/L (ref 3.5–5.3)
POTASSIUM SERPL-SCNC: 4.9 MMOL/L (ref 3.5–5.3)
POTASSIUM SERPL-SCNC: 5.2 MMOL/L (ref 3.5–5.3)
POTASSIUM SERPL-SCNC: 5.7 MMOL/L (ref 3.5–5.3)
PR INTERVAL: 144 MS
PROT SERPL-MCNC: 6.1 G/DL (ref 6.4–8.2)
PROT SERPL-MCNC: 6.7 G/DL (ref 6.4–8.2)
PROTHROMBIN TIME: 18.5 SECONDS (ref 9.8–12.8)
Q ONSET: 219 MS
QRS COUNT: 14 BEATS
QRS DURATION: 80 MS
QT INTERVAL: 368 MS
QTC CALCULATION(BAZETT): 440 MS
QTC FREDERICIA: 415 MS
R AXIS: 35 DEGREES
RBC # BLD AUTO: 3.69 X10*6/UL (ref 4–5.2)
RBC # BLD AUTO: 4.03 X10*6/UL (ref 4–5.2)
SAO2 % BLDV: 52 % (ref 45–75)
SODIUM BLDV-SCNC: 132 MMOL/L (ref 136–145)
SODIUM SERPL-SCNC: 135 MMOL/L (ref 136–145)
SODIUM SERPL-SCNC: 136 MMOL/L (ref 136–145)
SODIUM SERPL-SCNC: 138 MMOL/L (ref 136–145)
T AXIS: 160 DEGREES
T OFFSET: 403 MS
VENTRICULAR RATE: 86 BPM
WBC # BLD AUTO: 4.8 X10*3/UL (ref 4.4–11.3)
WBC # BLD AUTO: 6 X10*3/UL (ref 4.4–11.3)

## 2024-12-30 PROCEDURE — 8010000001 HC DIALYSIS - HEMODIALYSIS PER DAY

## 2024-12-30 PROCEDURE — 36415 COLL VENOUS BLD VENIPUNCTURE: CPT

## 2024-12-30 PROCEDURE — 85025 COMPLETE CBC W/AUTO DIFF WBC: CPT

## 2024-12-30 PROCEDURE — 84100 ASSAY OF PHOSPHORUS: CPT

## 2024-12-30 PROCEDURE — 2500000005 HC RX 250 GENERAL PHARMACY W/O HCPCS

## 2024-12-30 PROCEDURE — 1200000002 HC GENERAL ROOM WITH TELEMETRY DAILY

## 2024-12-30 PROCEDURE — 84132 ASSAY OF SERUM POTASSIUM: CPT

## 2024-12-30 PROCEDURE — 93010 ELECTROCARDIOGRAM REPORT: CPT | Performed by: INTERNAL MEDICINE

## 2024-12-30 PROCEDURE — 2500000001 HC RX 250 WO HCPCS SELF ADMINISTERED DRUGS (ALT 637 FOR MEDICARE OP)

## 2024-12-30 PROCEDURE — 93005 ELECTROCARDIOGRAM TRACING: CPT

## 2024-12-30 PROCEDURE — 70450 CT HEAD/BRAIN W/O DYE: CPT | Performed by: RADIOLOGY

## 2024-12-30 PROCEDURE — 2500000002 HC RX 250 W HCPCS SELF ADMINISTERED DRUGS (ALT 637 FOR MEDICARE OP, ALT 636 FOR OP/ED)

## 2024-12-30 PROCEDURE — 90935 HEMODIALYSIS ONE EVALUATION: CPT | Performed by: INTERNAL MEDICINE

## 2024-12-30 PROCEDURE — 99291 CRITICAL CARE FIRST HOUR: CPT

## 2024-12-30 PROCEDURE — 83735 ASSAY OF MAGNESIUM: CPT

## 2024-12-30 PROCEDURE — 5A1D70Z PERFORMANCE OF URINARY FILTRATION, INTERMITTENT, LESS THAN 6 HOURS PER DAY: ICD-10-PCS | Performed by: INTERNAL MEDICINE

## 2024-12-30 PROCEDURE — 2500000004 HC RX 250 GENERAL PHARMACY W/ HCPCS (ALT 636 FOR OP/ED)

## 2024-12-30 PROCEDURE — 85018 HEMOGLOBIN: CPT

## 2024-12-30 PROCEDURE — 82435 ASSAY OF BLOOD CHLORIDE: CPT

## 2024-12-30 PROCEDURE — 94640 AIRWAY INHALATION TREATMENT: CPT

## 2024-12-30 PROCEDURE — 70450 CT HEAD/BRAIN W/O DYE: CPT

## 2024-12-30 PROCEDURE — 82306 VITAMIN D 25 HYDROXY: CPT

## 2024-12-30 PROCEDURE — 80053 COMPREHEN METABOLIC PANEL: CPT

## 2024-12-30 PROCEDURE — 82248 BILIRUBIN DIRECT: CPT

## 2024-12-30 RX ORDER — HYDRALAZINE HYDROCHLORIDE 20 MG/ML
10 INJECTION INTRAMUSCULAR; INTRAVENOUS ONCE
Status: DISCONTINUED | OUTPATIENT
Start: 2024-12-30 | End: 2024-12-30

## 2024-12-30 RX ORDER — DIPHENHYDRAMINE HCL 25 MG
25 CAPSULE ORAL ONCE
Status: COMPLETED | OUTPATIENT
Start: 2024-12-30 | End: 2024-12-30

## 2024-12-30 RX ORDER — IPRATROPIUM BROMIDE AND ALBUTEROL SULFATE 2.5; .5 MG/3ML; MG/3ML
3 SOLUTION RESPIRATORY (INHALATION) EVERY 6 HOURS PRN
Status: ACTIVE | OUTPATIENT
Start: 2024-12-30

## 2024-12-30 RX ORDER — SUMATRIPTAN SUCCINATE 25 MG/1
25 TABLET ORAL EVERY 2 HOUR PRN
Status: DISPENSED | OUTPATIENT
Start: 2024-12-30

## 2024-12-30 RX ORDER — TORSEMIDE 20 MG/1
20 TABLET ORAL
Status: DISCONTINUED | OUTPATIENT
Start: 2024-12-31 | End: 2024-12-30

## 2024-12-30 RX ORDER — ACETAMINOPHEN 10 MG/ML
1000 INJECTION, SOLUTION INTRAVENOUS EVERY 6 HOURS SCHEDULED
Status: DISCONTINUED | OUTPATIENT
Start: 2024-12-30 | End: 2024-12-30

## 2024-12-30 RX ORDER — PROCHLORPERAZINE EDISYLATE 5 MG/ML
2.5 INJECTION INTRAMUSCULAR; INTRAVENOUS ONCE
Status: COMPLETED | OUTPATIENT
Start: 2024-12-30 | End: 2024-12-30

## 2024-12-30 RX ORDER — IPRATROPIUM BROMIDE AND ALBUTEROL SULFATE 2.5; .5 MG/3ML; MG/3ML
3 SOLUTION RESPIRATORY (INHALATION)
Status: DISCONTINUED | OUTPATIENT
Start: 2024-12-30 | End: 2024-12-30

## 2024-12-30 RX ORDER — ACETAMINOPHEN 10 MG/ML
1000 INJECTION, SOLUTION INTRAVENOUS ONCE
Status: DISCONTINUED | OUTPATIENT
Start: 2024-12-30 | End: 2024-12-30

## 2024-12-30 RX ORDER — CLONIDINE HYDROCHLORIDE 0.1 MG/1
0.2 TABLET ORAL EVERY MORNING
Status: DISCONTINUED | OUTPATIENT
Start: 2024-12-30 | End: 2025-01-01

## 2024-12-30 RX ORDER — ALBUTEROL SULFATE 0.83 MG/ML
2.5 SOLUTION RESPIRATORY (INHALATION) EVERY 6 HOURS PRN
Status: ACTIVE | OUTPATIENT
Start: 2024-12-30

## 2024-12-30 RX ORDER — TORSEMIDE 20 MG/1
20 TABLET ORAL
Status: DISPENSED | OUTPATIENT
Start: 2024-12-31

## 2024-12-30 RX ORDER — NICARDIPINE HYDROCHLORIDE 0.2 MG/ML
2.5-15 INJECTION INTRAVENOUS CONTINUOUS
Status: DISCONTINUED | OUTPATIENT
Start: 2024-12-30 | End: 2024-12-31

## 2024-12-30 RX ORDER — GABAPENTIN 100 MG/1
100 CAPSULE ORAL NIGHTLY PRN
Status: DISCONTINUED | OUTPATIENT
Start: 2024-12-30 | End: 2024-12-31

## 2024-12-30 RX ORDER — NICARDIPINE HYDROCHLORIDE 0.2 MG/ML
2.5-15 INJECTION INTRAVENOUS CONTINUOUS
Status: DISCONTINUED | OUTPATIENT
Start: 2024-12-30 | End: 2024-12-30

## 2024-12-30 RX ORDER — TORSEMIDE 20 MG/1
20 TABLET ORAL
Status: DISPENSED | OUTPATIENT
Start: 2024-12-30

## 2024-12-30 RX ORDER — ACETAMINOPHEN 325 MG/1
975 TABLET ORAL 3 TIMES DAILY PRN
Status: DISCONTINUED | OUTPATIENT
Start: 2024-12-30 | End: 2025-01-03

## 2024-12-30 RX ADMIN — CARVEDILOL 25 MG: 25 TABLET, FILM COATED ORAL at 08:21

## 2024-12-30 RX ADMIN — DIPHENHYDRAMINE HYDROCHLORIDE 25 MG: 25 CAPSULE ORAL at 04:36

## 2024-12-30 RX ADMIN — Medication 6 L/MIN: at 01:51

## 2024-12-30 RX ADMIN — NICARDIPINE HYDROCHLORIDE 7.5 MG/HR: 0.2 INJECTION, SOLUTION INTRAVENOUS at 21:00

## 2024-12-30 RX ADMIN — ONDANSETRON 4 MG: 2 INJECTION INTRAMUSCULAR; INTRAVENOUS at 18:17

## 2024-12-30 RX ADMIN — CALCIUM ACETATE 1334 MG: 667 CAPSULE ORAL at 08:21

## 2024-12-30 RX ADMIN — APIXABAN 5 MG: 5 TABLET, FILM COATED ORAL at 08:21

## 2024-12-30 RX ADMIN — ACETAMINOPHEN 975 MG: 325 TABLET, FILM COATED ORAL at 04:36

## 2024-12-30 RX ADMIN — ACETAMINOPHEN 975 MG: 325 TABLET, FILM COATED ORAL at 22:28

## 2024-12-30 RX ADMIN — NICARDIPINE HYDROCHLORIDE 5 MG/HR: 0.2 INJECTION, SOLUTION INTRAVENOUS at 01:04

## 2024-12-30 RX ADMIN — ASPIRIN 81 MG: 81 TABLET, COATED ORAL at 08:21

## 2024-12-30 RX ADMIN — SODIUM ZIRCONIUM CYCLOSILICATE 10 G: 10 POWDER, FOR SUSPENSION ORAL at 04:37

## 2024-12-30 RX ADMIN — IPRATROPIUM BROMIDE AND ALBUTEROL SULFATE 3 ML: .5; 3 SOLUTION RESPIRATORY (INHALATION) at 01:51

## 2024-12-30 RX ADMIN — GABAPENTIN 100 MG: 100 CAPSULE ORAL at 22:28

## 2024-12-30 RX ADMIN — NIFEDIPINE 90 MG: 90 TABLET, FILM COATED, EXTENDED RELEASE ORAL at 20:26

## 2024-12-30 RX ADMIN — APIXABAN 5 MG: 5 TABLET, FILM COATED ORAL at 20:24

## 2024-12-30 RX ADMIN — Medication 2 L/MIN: at 22:06

## 2024-12-30 RX ADMIN — PROCHLORPERAZINE EDISYLATE 2.5 MG: 5 INJECTION INTRAMUSCULAR; INTRAVENOUS at 01:45

## 2024-12-30 RX ADMIN — CLONIDINE HYDROCHLORIDE 0.2 MG: 0.1 TABLET ORAL at 10:11

## 2024-12-30 RX ADMIN — IPRATROPIUM BROMIDE AND ALBUTEROL SULFATE 3 ML: .5; 3 SOLUTION RESPIRATORY (INHALATION) at 07:18

## 2024-12-30 RX ADMIN — CALCIUM ACETATE 1334 MG: 667 CAPSULE ORAL at 12:48

## 2024-12-30 RX ADMIN — FLUTICASONE FUROATE AND VILANTEROL TRIFENATATE 1 PUFF: 100; 25 POWDER RESPIRATORY (INHALATION) at 07:18

## 2024-12-30 RX ADMIN — Medication 2 L/MIN: at 07:19

## 2024-12-30 RX ADMIN — ASCORBIC ACID, THIAMINE MONONITRATE,RIBOFLAVIN, NIACINAMIDE, PYRIDOXINE HYDROCHLORIDE, FOLIC ACID, CYANOCOBALAMIN, BIOTIN, CALCIUM PANTOTHENATE, 1 CAPSULE: 100; 1.5; 1.7; 20; 10; 1; 6000; 150000; 5 CAPSULE, LIQUID FILLED ORAL at 08:21

## 2024-12-30 RX ADMIN — SUMATRIPTAN 25 MG: 25 TABLET, FILM COATED ORAL at 18:08

## 2024-12-30 RX ADMIN — CARVEDILOL 25 MG: 25 TABLET, FILM COATED ORAL at 20:24

## 2024-12-30 RX ADMIN — ATORVASTATIN CALCIUM 80 MG: 80 TABLET, FILM COATED ORAL at 20:24

## 2024-12-30 RX ADMIN — NICARDIPINE HYDROCHLORIDE 5 MG/HR: 0.2 INJECTION, SOLUTION INTRAVENOUS at 18:23

## 2024-12-30 RX ADMIN — TORSEMIDE 20 MG: 20 TABLET ORAL at 23:25

## 2024-12-30 ASSESSMENT — COGNITIVE AND FUNCTIONAL STATUS - GENERAL
CLIMB 3 TO 5 STEPS WITH RAILING: A LOT
MOVING TO AND FROM BED TO CHAIR: A LITTLE
DRESSING REGULAR LOWER BODY CLOTHING: A LITTLE
WALKING IN HOSPITAL ROOM: A LOT
HELP NEEDED FOR BATHING: A LITTLE
TURNING FROM BACK TO SIDE WHILE IN FLAT BAD: A LITTLE
TOILETING: A LITTLE
STANDING UP FROM CHAIR USING ARMS: A LITTLE
DRESSING REGULAR UPPER BODY CLOTHING: A LITTLE
PERSONAL GROOMING: A LITTLE
MOVING FROM LYING ON BACK TO SITTING ON SIDE OF FLAT BED WITH BEDRAILS: A LITTLE
MOBILITY SCORE: 16
DAILY ACTIVITIY SCORE: 18
EATING MEALS: A LITTLE

## 2024-12-30 ASSESSMENT — PAIN - FUNCTIONAL ASSESSMENT
PAIN_FUNCTIONAL_ASSESSMENT: 0-10

## 2024-12-30 ASSESSMENT — PAIN SCALES - GENERAL
PAINLEVEL_OUTOF10: 0 - NO PAIN
PAINLEVEL_OUTOF10: 10 - WORST POSSIBLE PAIN
PAINLEVEL_OUTOF10: 8
PAINLEVEL_OUTOF10: 8
PAINLEVEL_OUTOF10: 7

## 2024-12-30 ASSESSMENT — PAIN DESCRIPTION - DESCRIPTORS: DESCRIPTORS: CRAMPING;ACHING

## 2024-12-30 NOTE — CARE PLAN
Problem: Pain - Adult  Goal: Verbalizes/displays adequate comfort level or baseline comfort level  Outcome: Progressing     Problem: Safety - Adult  Goal: Free from fall injury  Outcome: Progressing     Problem: Discharge Planning  Goal: Discharge to home or other facility with appropriate resources  Outcome: Progressing     Problem: Chronic Conditions and Co-morbidities  Goal: Patient's chronic conditions and co-morbidity symptoms are monitored and maintained or improved  Outcome: Progressing     Problem: Skin  Goal: Decreased wound size/increased tissue granulation at next dressing change  Outcome: Progressing  Flowsheets (Taken 12/30/2024 0850)  Decreased wound size/increased tissue granulation at next dressing change: Promote sleep for wound healing  Goal: Participates in plan/prevention/treatment measures  Outcome: Progressing  Flowsheets (Taken 12/30/2024 0850)  Participates in plan/prevention/treatment measures: Discuss with provider PT/OT consult  Goal: Prevent/manage excess moisture  Outcome: Progressing  Flowsheets (Taken 12/30/2024 0850)  Prevent/manage excess moisture: Cleanse incontinence/protect with barrier cream  Goal: Prevent/minimize sheer/friction injuries  Outcome: Progressing  Flowsheets (Taken 12/30/2024 0850)  Prevent/minimize sheer/friction injuries: Increase activity/out of bed for meals  Goal: Promote/optimize nutrition  Outcome: Progressing  Flowsheets (Taken 12/30/2024 0850)  Promote/optimize nutrition: Assist with feeding  Goal: Promote skin healing  Outcome: Progressing  Flowsheets (Taken 12/30/2024 0850)  Promote skin healing: Assess skin/pad under line(s)/device(s)     Problem: Fall/Injury  Goal: Not fall by end of shift  Outcome: Progressing  Goal: Be free from injury by end of the shift  Outcome: Progressing  Goal: Verbalize understanding of personal risk factors for fall in the hospital  Outcome: Progressing  Goal: Verbalize understanding of risk factor reduction measures to  prevent injury from fall in the home  Outcome: Progressing  Goal: Use assistive devices by end of the shift  Outcome: Progressing  Goal: Pace activities to prevent fatigue by end of the shift  Outcome: Progressing     Problem: Diabetes  Goal: Achieve decreasing blood glucose levels by end of shift  Outcome: Progressing  Goal: Increase stability of blood glucose readings by end of shift  Outcome: Progressing  Goal: Decrease in ketones present in urine by end of shift  Outcome: Progressing  Goal: Maintain electrolyte levels within acceptable range throughout shift  Outcome: Progressing  Goal: Maintain glucose levels >70mg/dl to <250mg/dl throughout shift  Outcome: Progressing  Goal: No changes in neurological exam by end of shift  Outcome: Progressing  Goal: Learn about and adhere to nutrition recommendations by end of shift  Outcome: Progressing  Goal: Vital signs within normal range for age by end of shift  Outcome: Progressing  Goal: Increase self care and/or family involovement by end of shift  Outcome: Progressing  Goal: Receive DSME education by end of shift  Outcome: Progressing     Problem: Pain  Goal: Takes deep breaths with improved pain control throughout the shift  Outcome: Progressing  Goal: Turns in bed with improved pain control throughout the shift  Outcome: Progressing  Goal: Walks with improved pain control throughout the shift  Outcome: Progressing  Goal: Performs ADL's with improved pain control throughout shift  Outcome: Progressing  Goal: Participates in PT with improved pain control throughout the shift  Outcome: Progressing  Goal: Free from opioid side effects throughout the shift  Outcome: Progressing  Goal: Free from acute confusion related to pain meds throughout the shift  Outcome: Progressing     Problem: Nutrition  Goal: Less than 5 days NPO/clear liquids  Outcome: Progressing  Goal: Oral intake greater than 50%  Outcome: Progressing  Goal: Oral intake greater 75%  Outcome:  Progressing  Goal: Consume prescribed supplement  Outcome: Progressing  Goal: Adequate PO fluid intake  Outcome: Progressing  Goal: Nutrition support goals are met within 48 hrs  Outcome: Progressing  Goal: Nutrition support is meeting 75% of nutrient needs  Outcome: Progressing  Goal: Tube feed tolerance  Outcome: Progressing  Goal: BG  mg/dL  Outcome: Progressing  Goal: Lab values WNL  Outcome: Progressing  Goal: Electrolytes WNL  Outcome: Progressing  Goal: Promote healing  Outcome: Progressing  Goal: Maintain stable weight  Outcome: Progressing  Goal: Reduce weight from edema/fluid  Outcome: Progressing  Goal: Gradual weight gain  Outcome: Progressing  Goal: Improve ostomy output  Outcome: Progressing   The patient's goals for the shift include      The clinical goals for the shift include pt will have decreased BP and O2 requirements

## 2024-12-30 NOTE — PROCEDURES
I evaluated the patient during dialysis. No complaints.    BP: 108/47  BFR: 350  Anticipated fluid removal: 2L  Vascular access: right arm AVF    Plan: Continue TTS dialysis schedule after New Year.  .  Ron

## 2024-12-30 NOTE — PROGRESS NOTES
Pharmacy Medication History Review    Stacey Heath is a 54 y.o. female admitted for Flash pulmonary edema. Pharmacy reviewed the patient's [id number] medications and allergies for accuracy.    Medications ADDED:  None   Medications CHANGED:  None   Medications REMOVED:   None      The list below reflects the updated PTA list.   Prior to Admission Medications   Prescriptions Last Dose Informant   NIFEdipine ER (Adalat CC) 90 mg 24 hr tablet  Self   Sig: Take 1 tablet (90 mg) by mouth once daily at bedtime. Do not crush, chew, or split.   SUMAtriptan (Imitrex) 25 mg tablet  Self   Sig: Take 1 tablet (25 mg) by mouth 1 time if needed for migraine. May repeat dose once in 2 hours if no relief.  Do not exceed 2 doses in 24 hours.   acetaminophen (Tylenol) 325 mg tablet  Self   Sig: Take 2 tablets (650 mg) by mouth every 6 hours if needed for headaches.   albuterol (ProAir HFA) 90 mcg/actuation inhaler  Self   Sig: Inhale 2 puffs every 4 hours if needed for wheezing or shortness of breath.   albuterol 1.25 mg/3 mL nebulizer solution  Self   Sig: Take 3 mL (1.25 mg) by nebulization every 6 hours if needed for wheezing or shortness of breath.   apixaban (Eliquis) 5 mg tablet  Self   Sig: Take 1 tablet (5 mg) by mouth 2 times a day.   aspirin 81 mg EC tablet  Self   Sig: Take 1 tablet (81 mg) by mouth once daily.   atorvastatin (Lipitor) 80 mg tablet  Self   Sig: Take 1 tablet (80 mg) by mouth once daily at bedtime.   calcium acetate (Phoslo) 667 mg capsule  Self   Sig: Take 2 capsules (1,334 mg) by mouth 3 times daily (morning, midday, late afternoon).   carvedilol (Coreg) 25 mg tablet  Self   Sig: Take 1 tablet (25 mg) by mouth 2 times a day.   cloNIDine (Catapres) 0.1 mg tablet  Self   Sig: Take 2 tablets (0.2 mg) by mouth once daily at bedtime.   diclofenac sodium (Voltaren) 1 % gel  Self   Sig: Apply 4.5 inches (4 g) topically 4 times a day as needed (back pain).   docusate sodium (Colace) 100 mg capsule  Self    Sig: Take 1 capsule (100 mg) by mouth 2 times a day as needed for constipation.   epoetin joceline (Epogen,Procrit) 10,000 unit/mL injection  Self   Sig: Inject 1 mL (10,000 Units) under the skin 3 times a week.   fluticasone (Flonase) 50 mcg/actuation nasal spray  Self   Sig: Administer 2 sprays into each nostril once daily. Shake gently. Before first use, prime pump. After use, clean tip and replace cap.   Patient taking differently: Administer 2 sprays into each nostril once daily as needed for allergies. Shake gently. Before first use, prime pump. After use, clean tip and replace cap.   fluticasone furoate-vilanteroL (Breo Ellipta) 100-25 mcg/dose inhaler  Self   Sig: Inhale 1 puff once daily.   gabapentin (Neurontin) 300 mg capsule  Self   Sig: Take 1 capsule (300 mg) by mouth once daily at bedtime.   Patient taking differently: Take 1 capsule (300 mg) by mouth as needed at bedtime (pain).   lidocaine 4 % patch  Self   Sig: Place 1 patch over 12 hours on the skin once daily. Remove & discard patch within 12 hours or as directed by MD.   pantoprazole (ProtoNix) 40 mg EC tablet  Self   Sig: Take 1 tablet (40 mg) by mouth if needed (not regular take it). Do not crush, chew, or split.   polyethylene glycol (Glycolax, Miralax) 17 gram/dose powder  Self   Sig: Take 17 g (1 scoop dissolved in liquid) by mouth once daily.   torsemide (Demadex) 20 mg tablet  Self   Sig: Take 1 tablet after dialysis on dialysis days and twice daily on non dialysis days   valsartan (Diovan) 320 mg tablet Not Taking Self   Sig: Take 1 tablet (320 mg) by mouth once daily in the evening.   Patient not taking: Reported on 12/30/2024   vitamin B complex-vitamin C-folic acid (Nephrocaps) 1 mg capsule  Self   Sig: Take 1 capsule by mouth once daily.      Facility-Administered Medications: None        The list below reflects the updated allergy list. Please review each documented allergy for additional clarification and justification.  Allergies   "Reviewed by Frances Stubbs RN on 12/29/2024        Severity Reactions Comments    Iodine High Hives, Itching, Unknown     Bee Pollen Not Specified Unknown     Bioflavonoids Not Specified Swelling     Citrus And Derivatives Not Specified Unknown     Codeine Not Specified Itching, Hives, Unknown Tolerates percocet   Tolerates percocet Tolerates percocet    Flowers Not Specified Itching     Gadolinium-containing Contrast Media Not Specified Unknown     Shellfish Containing Products Not Specified Swelling SEAFOOD            Patient accepts M2B at discharge.   Local pharmacy: [facility]     Sources:   Patient interview - cannot recall medication names from memory. Prompted with drug name/indication and patient can confirm she takes and # of pills and frequency for all medications.   Dispense history   [facility] - last filled gabapentin 300 mg 30 day supply in September 2024 - takes as needed.     Additional Comments:  Fluticasone-vilanterol 100-25 mcg inhalation daily - patient reports using this inhaler daily and has enough at home despite the last fill at a pharmacy was on 09/20/2024 for a 30 day supply.   Valsartan 320 mg po daily - patient is not taking at home because no prescription was provided. It was discontinued and incorrectly reordered outpatient at discharge on 10/24/24 as \"No Print\" meaning no paper Rx or no eRx was given/sent to a pharmacy.       Steve Mancini, PharmD  Transitions of Care Pharmacist  12/30/24 10:35 AM     Secure Chat preferred   If no response call [phone] or Vocera \"Med Rec\"   "

## 2024-12-30 NOTE — PROGRESS NOTES
Stacey Heath  Age: 54 y.o.  MRN: [id number]  Date: 12/26/2024  Location of service: in community    Program Details  Medicaid Community Clinical Case Management  Status: Enrolled  Effective Dates: 10/17/2023 - present  Responsible Staff: NAREN Davidson      Goals Reviewed:  Problem: Anxiety       Goal: Maintain coping skills for continuous improvement       Priority: Medium        Problem: Kidney Issues       Goal: Improve health to get on kidney transplant list       Priority: High        Problem: Negative Experience, Conflict with, or Distrust of Providers and/or Health System       Goal: Plan to Address Patient Specific Negative Experience, Distrust, or Conflict with Providers and/or Health System       Priority: High        Problem: Risk of Uncoordinated Care       Goal: Care will be Coordinated and Supported by a Multidisciplinary Team of Providers       Priority: High          Summary:  This writer met with patient at [facility] to attend her dialysis with her to provide support per patient and Dr. Barraza's request.  Patient states she has diarrhea today and expects dialysis to go poorly.  Dr. Barraza called the patient shortly after patient started dialysis and discussed the plan with the patient, she also recommended the patient start taking Imodium OTC. She also spoke with this writer and instructed this writer to change patient's dry weight on her instruction paper from 53.5 to 53.0. Patient's dialysis nurse comes over and confirms the plan.  Machine was stopped from removing fluid mid-session, however patient states she did not realize it was stopped. A nurse came over and turned it back on.  Patient began cramping severely at the end of the session. The nurses gave the patient fluid to stop the cramping.     Appointment start time: 1045  Appointment completion time: 1516  Total time spent with patient (in minutes): 271  Non-Billable Time: 271  Billable Time Total: 0    Roberta Gallego  RN

## 2024-12-30 NOTE — CARE PLAN
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 She did not miss any treatments or medications. Nephrology consulted for management of ESKD.    #ESKD on HD TTS  - HD unit: [facility]   - Dr Barraza  - TTS  - Access: RUE AVF     #Hemodynamics  - Hypertensive on Nitroglycerine infusion.    #Respiratory failure  - Pulmonary edema requiring BiPAP and Nitroglycerine infusion    ** Recommendations:  - Proceed with IUF as per order. Dialysis nurse contacted.  - Full consult note to follow.

## 2024-12-30 NOTE — CONSULTS
"Nutrition Initial Assessment:   Nutrition Assessment    Reason for Assessment: Admission nursing screening    Patient is a 54 y.o. female presenting with SOB in setting of possible missed HD session.  Noted to have elevated BP and started on nicardipine drip.     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  She was receiving HD in her room at visit.  She reports a decreased appetite and intake PTA.  Says that she ordered breakfast but only ate the oatmeal off of her tray.  Her lunch tray arrived just before RDN visit-- pt waiting until after HD to eat. She has a hard time stating a usual body weight for herself but thinks HD is trying to get her down to 53kg.  She is unsure what dry weight she has been at lately.  Has been drinking various protein shakes as well-- cannot state which ones or how often.  Endorses nausea and diarrhea.  Food Allergies/Intolerances:   shellfish and citrus       Anthropometrics:  Height: 139.7 cm (4' 7\")   Weight: 57.9 kg (127 lb 10.3 oz)   BMI (Calculated): 29.67           Weight History:     12/29/24: 57.9kg and 55.3kg also listed  11/25/24: 52.6kg  10/24/24: 57.6kg  9/24/24: 52.6kg  8/23/24: 51.3kg  7/1/24: 58.3kg  4/24/24: 59kg  10/26/23: 57.7kg    Weights overall appear stable in the last 14months.  Has fluctuations r/t missed HD sessions.     Weight Change %:       Nutrition Focused Physical Exam Findings:  Defer-- while pt was on HD  Subcutaneous Fat Loss:      Muscle Wasting:     Edema:  Edema: none  Physical Findings:  Skin: Negative    Nutrition Significant Labs:  A1C:  Lab Results   Component Value Date    HGBA1C 4.7 11/09/2024   , BG POCT trend:   Results from last 7 days   Lab Units 12/29/24  2117   POCT GLUCOSE mg/dL 125*    , Renal Lab Trend:   Results from last 7 days   Lab Units 12/30/24  0657 " 12/30/24  0240 12/29/24  2341 12/29/24  1845 12/29/24  1845   POTASSIUM mmol/L 5.2 5.7* 4.9  --  5.0   PHOSPHORUS mg/dL 8.3* 8.3* 8.2*   < >  --    SODIUM mmol/L 138 136 135*  --  141   MAGNESIUM mg/dL 2.54*  --  2.35  --  2.55*   EGFR mL/min/1.73m*2 5* 5* 5*  --  5*   BUN mg/dL 47* 44* 46*  --  42*   CREATININE mg/dL 9.22* 8.21* 8.44*  --  8.38*    < > = values in this interval not displayed.    , Vit D:   Lab Results   Component Value Date    VITD25 18 (L) 05/30/2024        Nutrition Specific Medications:  Scheduled medications  apixaban, 5 mg, oral, BID  aspirin, 81 mg, oral, Daily  atorvastatin, 80 mg, oral, Nightly  calcium acetate, 1,334 mg, oral, TID  carvedilol, 25 mg, oral, BID  cloNIDine, 0.2 mg, oral, q AM  fluticasone, 2 spray, Each Nostril, Daily  fluticasone furoate-vilanteroL, 1 puff, inhalation, Daily  lidocaine, 1 patch, transdermal, Daily  NIFEdipine ER, 90 mg, oral, Nightly  oxygen, , inhalation, Continuous - Inhalation  polyethylene glycol, 17 g, oral, Daily  torsemide, 20 mg, oral, Every Mon/Wed/Fri  [Held by provider] valsartan, 320 mg, oral, q PM  vitamin B complex-vitamin C-folic acid, 1 capsule, oral, Daily      Continuous medications  niCARdipine, 2.5-15 mg/hr, Last Rate: Stopped (12/30/24 1215)      PRN medications  PRN medications: acetaminophen, albuterol, diclofenac sodium, gabapentin, ipratropium-albuteroL, ondansetron     I/O:    ;        Dietary Orders (From admission, onward)       Start     Ordered    12/30/24 0937  Adult diet Renal, Cardiac; 70 gm fat; 2 - 3 grams Sodium; Potassium Restricted 2 gm (50mEq)  Diet effective now        Question Answer Comment   Diet type Renal    Diet type Cardiac    Fat restriction: 70 gm fat    Sodium restriction: 2 - 3 grams Sodium    Potassium restriction: Potassium Restricted 2 gm (50mEq)        12/30/24 0936    12/29/24 2137  May Not Participate in Room Service  ( ROOM SERVICE MAY NOT PARTICIPATE)  Once        Question:  .  Answer:  Yes     12/29/24 2136                     Estimated Needs:   Total Energy Estimated Needs (kCal):  (3019-3439)  Method for Estimating Needs: MSJ= 1004 using 55.3kg  Total Protein Estimated Needs (g): 70 g  Method for Estimating Needs: 1.3 x 55.3kg            Nutrition Diagnosis        Nutrition Diagnosis  Patient has Nutrition Diagnosis: Yes  Diagnosis Status (1): New  Nutrition Diagnosis 1: Inadequate oral intake  Related to (1): history of ESRD on HD  As Evidenced by (1): pt reports a decreased intake PTA with need for supplements     Difficult to determine overall nutrition status in patient in light of no NFPE today.  Her weights fluctuate based on fluids.    Nutrition Interventions/Recommendations         Nutrition Prescription:     Based on high potassium levels during admit, will keep patient on renal diet but will remove cardiac restriction.  Recheck Vitamin D level as it was deficient in May 2024  Consider appetite stimulant for patient if appetite continues to be poor (Remeron).          Nutrition Interventions:    RDN to order Nepro for her BID= 425kcals, 19grams protein each; she prefers chocolate or strawberry option but will need to wait until labs improve to order her something like Ensure that is not low in lytes but does come in more flavor varieties.        Nutrition Education:   Not appropriate       Nutrition Monitoring and Evaluation   Food/Nutrient Related History Monitoring  Monitoring and Evaluation Plan: Energy intake  Criteria: PO diet with supplements to meet >75% estimated nutrition needs         Time Spent (min): 45 minutes

## 2024-12-30 NOTE — CARE PLAN
Problem: Pain - Adult  Goal: Verbalizes/displays adequate comfort level or baseline comfort level  Outcome: Progressing     Problem: Safety - Adult  Goal: Free from fall injury  Outcome: Progressing     Problem: Discharge Planning  Goal: Discharge to home or other facility with appropriate resources  Outcome: Progressing     Problem: Chronic Conditions and Co-morbidities  Goal: Patient's chronic conditions and co-morbidity symptoms are monitored and maintained or improved  Outcome: Progressing     Problem: Skin  Goal: Decreased wound size/increased tissue granulation at next dressing change  Outcome: Progressing  Flowsheets (Taken 12/29/2024 2231)  Decreased wound size/increased tissue granulation at next dressing change:   Promote sleep for wound healing   Protective dressings over bony prominences  Goal: Participates in plan/prevention/treatment measures  Outcome: Progressing  Flowsheets (Taken 12/29/2024 2231)  Participates in plan/prevention/treatment measures:   Discuss with provider PT/OT consult   Elevate heels   Increase activity/out of bed for meals  Goal: Prevent/manage excess moisture  Outcome: Progressing  Flowsheets (Taken 12/29/2024 2231)  Prevent/manage excess moisture:   Cleanse incontinence/protect with barrier cream   Monitor for/manage infection if present  Goal: Prevent/minimize sheer/friction injuries  Outcome: Progressing  Flowsheets (Taken 12/29/2024 2231)  Prevent/minimize sheer/friction injuries:   Complete micro-shifts as needed if patient unable. Adjust patient position to relieve pressure points, not a full turn   Increase activity/out of bed for meals   Use pull sheet   HOB 30 degrees or less   Turn/reposition every 2 hours/use positioning/transfer devices   Utilize specialty bed per algorithm  Goal: Promote/optimize nutrition  Outcome: Progressing  Flowsheets (Taken 12/29/2024 2231)  Promote/optimize nutrition:   Assist with feeding   Monitor/record intake including meals   Discuss  with provider if NPO > 2 days  Goal: Promote skin healing  Outcome: Progressing  Flowsheets (Taken 12/29/2024 2231)  Promote skin healing:   Assess skin/pad under line(s)/device(s)   Protective dressings over bony prominences   Turn/reposition every 2 hours/use positioning/transfer devices   Ensure correct size (line/device) and apply per  instructions   Rotate device position/do not position patient on device     Problem: Fall/Injury  Goal: Not fall by end of shift  Outcome: Progressing  Goal: Be free from injury by end of the shift  Outcome: Progressing  Goal: Verbalize understanding of personal risk factors for fall in the hospital  Outcome: Progressing  Goal: Verbalize understanding of risk factor reduction measures to prevent injury from fall in the home  Outcome: Progressing  Goal: Use assistive devices by end of the shift  Outcome: Progressing  Goal: Pace activities to prevent fatigue by end of the shift  Outcome: Progressing     Problem: Diabetes  Goal: Achieve decreasing blood glucose levels by end of shift  Outcome: Progressing  Goal: Increase stability of blood glucose readings by end of shift  Outcome: Progressing  Goal: Decrease in ketones present in urine by end of shift  Outcome: Progressing  Goal: Maintain electrolyte levels within acceptable range throughout shift  Outcome: Progressing  Goal: Maintain glucose levels >70mg/dl to <250mg/dl throughout shift  Outcome: Progressing  Goal: No changes in neurological exam by end of shift  Outcome: Progressing  Goal: Learn about and adhere to nutrition recommendations by end of shift  Outcome: Progressing  Goal: Vital signs within normal range for age by end of shift  Outcome: Progressing  Goal: Increase self care and/or family involovement by end of shift  Outcome: Progressing  Goal: Receive DSME education by end of shift  Outcome: Progressing     Problem: Pain  Goal: Takes deep breaths with improved pain control throughout the shift  Outcome:  Progressing  Goal: Turns in bed with improved pain control throughout the shift  Outcome: Progressing  Goal: Walks with improved pain control throughout the shift  Outcome: Progressing  Goal: Performs ADL's with improved pain control throughout shift  Outcome: Progressing  Goal: Participates in PT with improved pain control throughout the shift  Outcome: Progressing  Goal: Free from opioid side effects throughout the shift  Outcome: Progressing  Goal: Free from acute confusion related to pain meds throughout the shift  Outcome: Progressing     Problem: Nutrition  Goal: Less than 5 days NPO/clear liquids  Outcome: Progressing  Goal: Oral intake greater than 50%  Outcome: Progressing  Goal: Oral intake greater 75%  Outcome: Progressing  Goal: Consume prescribed supplement  Outcome: Progressing  Goal: Adequate PO fluid intake  Outcome: Progressing  Goal: Nutrition support goals are met within 48 hrs  Outcome: Progressing  Goal: Nutrition support is meeting 75% of nutrient needs  Outcome: Progressing  Goal: Tube feed tolerance  Outcome: Progressing  Goal: BG  mg/dL  Outcome: Progressing  Goal: Lab values WNL  Outcome: Progressing  Goal: Electrolytes WNL  Outcome: Progressing  Goal: Promote healing  Outcome: Progressing  Goal: Maintain stable weight  Outcome: Progressing  Goal: Reduce weight from edema/fluid  Outcome: Progressing  Goal: Gradual weight gain  Outcome: Progressing  Goal: Improve ostomy output  Outcome: Progressing

## 2024-12-30 NOTE — ED PROVIDER NOTES
History of Present Illness     History provided by: Patient  Limitations to History: None  External Records Reviewed with Brief Summary:  Reviewed previous discharge summary from admission starting 12/3/2024, similar presentation for similar symptoms    HPI:  Stacey Heath is a 54 y.o. female past medical history of COPD hypertension hyperlipidemia ESRD on HD with frequent presentation flash pulmonary edema who presents today for acute onset shortness of breath.  Patient states this feels similar to previous times where she has had flash pulmonary edema.  She does endorse that she did miss dialysis 1 time this week.  She endorses some chest pain as well as shortness of breath.  She denies any leg swelling.  She states that she feels like she is fluid overloaded.  She denies any fevers, does endorse a cough but no sore throat.  She denies any numbness weakness tingling in arms or legs.    Physical Exam   Triage vitals:  T 37.1 °C (98.8 °F)  HR 98  BP (!) 256/110  RR (!) 22  O2 95 % Supplemental oxygen (2L)    Physical Exam  Vitals and nursing note reviewed.   Constitutional:       General: She is not in acute distress.     Appearance: Normal appearance. She is not ill-appearing or diaphoretic.      Comments: Chronically ill-appearing, otherwise in no acute distress   HENT:      Head: Normocephalic and atraumatic.      Mouth/Throat:      Mouth: Mucous membranes are moist.      Pharynx: No oropharyngeal exudate or posterior oropharyngeal erythema.   Eyes:      General: No scleral icterus.     Extraocular Movements: Extraocular movements intact.      Pupils: Pupils are equal, round, and reactive to light.   Cardiovascular:      Rate and Rhythm: Normal rate and regular rhythm.      Pulses: Normal pulses.      Heart sounds: Normal heart sounds. No murmur heard.     No gallop.   Pulmonary:      Effort: Pulmonary effort is normal. Tachypnea present. No respiratory distress.      Breath sounds: Normal breath sounds. No  stridor. No wheezing, rhonchi or rales.      Comments: Bilateral expiratory wheeze most prominent in bilateral upper lobes, crackles throughout bilateral lung fields  Abdominal:      General: Bowel sounds are normal. There is no distension.      Palpations: Abdomen is soft. There is no mass.      Tenderness: There is no abdominal tenderness.      Hernia: No hernia is present.   Musculoskeletal:         General: No swelling, deformity or signs of injury. Normal range of motion.      Cervical back: Normal range of motion and neck supple. No tenderness.   Skin:     General: Skin is warm.      Capillary Refill: Capillary refill takes less than 2 seconds.      Findings: No erythema, lesion or rash.   Neurological:      General: No focal deficit present.      Mental Status: She is alert and oriented to person, place, and time. Mental status is at baseline.   Psychiatric:         Mood and Affect: Mood normal.         Behavior: Behavior normal.          Medical Decision Making & ED Course   Medical Decision Makin y.o. female past medical history of COPD on room air at baseline, he hypertension hyperlipidemia ESRD on HD with frequent presentations for flash pulmonary edema who presents today for acute onset shortness of breath.  Patient does appear relatively well despite her blood pressure initially being 250s over 120s.  Patient was making appropriate commentary on 2 to 4 L nasal cannula.  However, given the fact that the patient has missed dialysis and has similar presentations, we did get point-of-care ultrasound which did demonstrate significant evidence of pulmonary edema suggestive of flash pulmonary edema.  Given this, we did initiate BiPAP as well as a nitro drip at 300.  Patient did have improvement in her blood pressure as well as improvement in her symptoms, however, did remain hypertensive to the 200s over 90s.  Given this, the patient will require ICU admission at this time.  I did reach out to the ICU  attending, who did accept them.  I did also reach out to the on-call nephrology fellow, who will help coordinate dialysis.  They did not feel the patient required dialysis overnight given the fact the patient does appear to be stable at this time.  ----      Differential diagnoses considered include but are not limited to: COPD, CHF, flash pulmonary edema     Social Determinants of Health which Significantly Impact Care: None identified     EKG Independent Interpretation:  EKG demonstrates normal sinus rhythm with a rate of 86, no evidence of acute ST elevation, T wave inversion or ST depression suggestive of MI.  Similar to previous EKGs.    Independent Result Review and Interpretation: Relevant laboratory and radiographic results were reviewed and independently interpreted by myself.  As necessary, they are commented on in the ED Course.    Chronic conditions affecting the patient's care: As documented above in [facility]    The patient was discussed with the following consultants/services:  ICU attending, accepted them to their service.  Nephrology, who will help coordinate dialysis    Care Considerations: As documented above in [facility]    ED Course:  Diagnoses as of 12/29/24 2014   Flash pulmonary edema   ESRD (end stage renal disease) (Multi)     Disposition   As a result of their workup, the patient will require admission to the hospital.  The patient was informed of her diagnosis.  The patient was given the opportunity to ask questions and I answered them. The patient agreed to be admitted to the hospital.    Procedures   Procedures    Patient seen and discussed with ED attending physician.    Hudson Jenkins MD  Emergency Medicine       Hudson Jenkins MD  Resident  12/29/24 2020

## 2024-12-30 NOTE — PROGRESS NOTES
"Medical Intensive Care - Daily Progress Note   Subjective    Stacey Heath is a 54 y.o. year old female patient admitted on 12/29/2024 with following ICU needs: cardene gtt     Interval History:  Pt switched from Nitroglycerin to Cardene drip for BP control d/t headache     Pt seen and evaluated at bedside. Patient states that breathing improved though notes intermittently becoming SOB. Also notes bitemporal HA, though denies any visual changes at time of eval     Denied chest pain, palpitations, abd pain, N/V/D/C, new worsening LE edema, or orthopnea     Notes that at baseline she makes small amount of urine    Meds    Scheduled medications  apixaban, 5 mg, oral, BID  aspirin, 81 mg, oral, Daily  atorvastatin, 80 mg, oral, Nightly  calcium acetate, 1,334 mg, oral, TID  carvedilol, 25 mg, oral, BID  cloNIDine, 0.2 mg, oral, Nightly  fluticasone, 2 spray, Each Nostril, Daily  fluticasone furoate-vilanteroL, 1 puff, inhalation, Daily  lidocaine, 1 patch, transdermal, Daily  NIFEdipine ER, 90 mg, oral, Nightly  oxygen, , inhalation, Continuous - Inhalation  polyethylene glycol, 17 g, oral, Daily  [Held by provider] valsartan, 320 mg, oral, q PM  vitamin B complex-vitamin C-folic acid, 1 capsule, oral, Daily      Continuous medications  niCARdipine, 2.5-15 mg/hr, Last Rate: 2.5 mg/hr (12/30/24 0415)      PRN medications  PRN medications: acetaminophen, albuterol, diclofenac sodium, ipratropium-albuteroL, ondansetron     Objective    Blood pressure (!) 183/74, pulse 74, temperature 36.3 °C (97.3 °F), temperature source Temporal, resp. rate 12, height 1.397 m (4' 7\"), weight 57.9 kg (127 lb 10.3 oz), SpO2 98%.     Physical Exam  Vitals and nursing note reviewed.   Constitutional:       General: She is not in acute distress.     Appearance: She is not toxic-appearing.   Cardiovascular:      Pulses:           Radial pulses are 2+ on the right side and 2+ on the left side.      Heart sounds: Murmur heard.      Systolic " murmur is present with a grade of 3/6.      Comments: Systolic bruit noted   Pulmonary:      Effort: Pulmonary effort is normal. No accessory muscle usage, prolonged expiration or respiratory distress.      Comments: On 2L O2 via NC   Occasional expiratory grunts, otherwise CTA b/l   Abdominal:      General: Abdomen is flat. There is no distension.      Palpations: Abdomen is soft.      Tenderness: There is no abdominal tenderness. There is no guarding.   Musculoskeletal:      Right lower leg: No edema.      Left lower leg: No edema.      Comments: RUE fistula noted, healed    Neurological:      Mental Status: She is alert, oriented to person, place, and time and easily aroused.   Psychiatric:         Mood and Affect: Mood normal.         Behavior: Behavior normal. Behavior is cooperative.            Intake/Output Summary (Last 24 hours) at 12/30/2024 0805  Last data filed at 12/30/2024 0700  Gross per 24 hour   Intake 1764.81 ml   Output 2222 ml   Net -457.19 ml     Labs:   Results from last 72 hours   Lab Units 12/30/24  0240 12/29/24  2341 12/29/24  1845   SODIUM mmol/L 136 135* 141   POTASSIUM mmol/L 5.7* 4.9 5.0   CHLORIDE mmol/L 96* 97* 99   CO2 mmol/L 25 26 28   BUN mg/dL 44* 46* 42*   CREATININE mg/dL 8.21* 8.44* 8.38*   GLUCOSE mg/dL 128* 125* 80   CALCIUM mg/dL 9.6 9.3 10.5   ANION GAP mmol/L 21* 17 19   EGFR mL/min/1.73m*2 5* 5* 5*   PHOSPHORUS mg/dL 8.3* 8.2*  --       Results from last 72 hours   Lab Units 12/29/24  2341 12/29/24  1845   WBC AUTO x10*3/uL 6.0 6.2   HEMOGLOBIN g/dL 10.9* 14.1   HEMATOCRIT % 34.6* 42.6   PLATELETS AUTO x10*3/uL 187 200   NEUTROS PCT AUTO % 61.0 63.4   LYMPHS PCT AUTO % 19.9 18.8   MONOS PCT AUTO % 7.9 5.3   EOS PCT AUTO % 10.2 11.1          Results from last 72 hours   Lab Units 12/30/24  0427 12/29/24  2341 12/29/24 2008   POCT PH, VENOUS pH 7.34 7.35 7.32*   POCT PCO2, VENOUS mm Hg 53* 51 52*   POCT PO2, VENOUS mm Hg 40 45 47*        Micro/ID:     Lab Results    Component Value Date    URINECULTURE NO SIGNIFICANT GROWTH. 07/09/2023    BLOODCULT  08/21/2023     No Growth at 1 days~No Growth at 2 days~No Growth at 3 days~NO GROWTH at 4 days - FINAL REPORT         Assessment and Plan     Ms. Heath is a 54 y.o. female with PMH significant for ESRD 2/2 on dialysis T/Th/Sat via RUE AVF (last complete session 11/28/2024), HTN, HFpEF, COPD, brachial DVT on Eliquis who presented for [facility] ED for acute onset shortness of breath.     In the ED, patient was HTN to 256/110 HR 98 tachypneic to 22. Labs demonstrated: RFP: Cr 8.38 BUN 42 / BNP 2319 / trop 84. VBG demonstrated pH 7.32 CO2 52 pO2 47 lactate 0.5. CXR demonstrated mild interstitial pulmonary edema. Patient was started on a nitro drip and BiPAP.   C/F Hypertensive emergency, flash pulmonary edema - consulted nephrology for urgent UF, initially on nitro gtt then switched to cardene gtt for BP control. Will wean cardene gtt as able     Mechanical Ventilation: none  Sedation/Analgesia:  none  Restraints: no     Updates 12/30/24:   -Pt to undergo HD today  -Restarted home clonidine   -Continuing cardene gtt for goal SBP <180   -Resumed home gabapentin nightly PRN   -Restarted home Torsemide 20mg after HD on dialysis days. Can consider resuming 20mg BID on non-HD days , and EPO on HD days       Plan:  NEUROLOGY/PSYCH:  #Peripheral Neuropathy  -Restarted home gabapentin PRN nightly       CARDIOVASCULAR:  #HTN Emergency c/b Flash Pulmonary Edema (improving)   #HFpEF  #Troponemia 2/2 T2 MI iso HTN, ESRD  :: Baseline home GDMT/BP medications: carvedilol 25 mg BID, clonidine 0.2 mg at bedtime, nifedipine ER 90 mg at bedtime, torsemide 20 mg on HD/BID on nHD days, valsartan 320 mg at bedtime  :: HS Trop peaked at 193, now downtrended   Plan:   -Wean Cardene drip as tolerated, if after HD still not meeting goal and having side effects will transition to cardene gtt goal -180s  -Holding home valsartan   -May benefit from GDMT  optimization including Entresto/Jardiance  while admitted      #Hx of LUE Non-obstructive Basilic and DVT  -Restart home Eliquis 5 mg BID when able to take PO -> potentially can be discontinued as >6 months therapy has been completed iso provoked DVT  -Will c/w home Eliquis, can consider having pt f/u with PCP regarding DC of Eliquis after discharge     #HLD  -Continue home statin: atorvastatin 80 mg QHS       PULMONARY:  #AHRF  #Flash Pulmonary Edema  -Continue BP control as above, nephrology consulted for HD. Pt underwent UF but had significant BP drop  -On BiPAP   -Pt to undergo HD today (to be on HD MWF while inpatient)      #COPD  -continue home inhalers: albuterol PRN, Breo Ellipta daily        RENAL/GENITOURINARY:  #ESRD on iHD Tu/Th/SAT  Plan:   -Restarted home nephrocaps/phoslo   -Restarted home torsemide (20mg   -Nephrology consulted, appreciate recommendations: underwent UF but experienced large drop in BP, can consider restarting home Epogen at their discretion  -Restarted home Torsemide 20mg after HD on dialysis days. Can consider resuming 20mg BID on non-HD days     #Hyperkalemia (resolved)   :: Found to be hyperkalemic to 5.7 with peaked T waves on EKG  on admission likely 2/2 missing HD   :: Received 1 dose Lokelma with f/u RFP showing K 5.2   Plan:   -Daily RFPs        GASTROENTEROLOGY:  #GERD  -Continue home Pantoprazole 40 mg PRN       ENDOCRINOLOGY:  :: Last A1c 4.7 11/9/24   -ZAIN   -Hypoglycemia protocol       HEMATOLOGY:  #Hx of Non-obstructive LUE Brachial V and Basilic V DVT (4/14/2024)  :: Per chart review noted to be provoked I/s/o PICC placement  :: Baseline on Eliquis 5mg BID  Plan:   -Continue with Eliquis 5 mg BID, potentially can be discontinued as >6 months therapy has been completed iso provoked DVT     #Anemia of Chronic Disease  -C/w Epogen TID w/ HD     MUSCULOSKELETAL/ SKIN:  No acute concerns currently     INFECTIOUS DISEASE:  No acute concerns currently      ICU Check List      FEN  Fluids: PRN, overloaded on admission, cautious  Electrolytes: Will replete PRN, with goals of Mg >2, K>4  Nutrition: Cardio/Nephro diet  Prophylaxis:  DVT ppx: Eliquis 5mg BID   GI ppx/Bowel care: Pantoprazole/Miralax        Social:  Code: Full Code    HPOA: Hai Pinedo (Significant Other)  [phone] (Mobile)           Rochelle Nair MD   12/30/24 at 8:05 AM     Disclaimer: Documentation completed with the information available at the time of input. The times in the chart may not be reflective of actual patient care times, interventions, or procedures. Documentation occurs after the physical care of the patient.

## 2024-12-30 NOTE — H&P
Medical Intensive Care - History and Physical   Subjective    Stacey Heath is a 54 y.o. year old female patient admitted on 12/29/2024 with following ICU needs: BiPAP, nitro drip    HPI:  54 y.o. female with PMH significant for ESRD 2/2 on dialysis T/Th/Sat via RUE AVF (last complete session 11/28/2024), HTN, HFpEF, COPD, brachial DVT on Eliquis who presented for [facility] ED for acute onset shortness of breath.    History limited given patient is on BiPAP but patient reports that this morning she acutely became short of breath that feels similar to the times she has had flash pulmonary edema. Per ED note, she stated she missed one session of dialysis- here she stated she never missed dialysis but that the holidays messed up her schedule from T/Th/Sat to M/Wed/Sat. Does endorse nausea w/o vomiting, headache since starting the nitro gtt. Denies CP/palpitations.    In the ED, patient was HTN to 256/110 HR 98 tachypneic to 22. Labs demonstrated: RFP: Cr 8.38 BUN 42 / BNP 2319 / trop 84. VBG demonstrated pH 7.32 CO2 52 pO2 47 lactate 0.5. CXR demonstrated mild interstitial pulmonary edema. Patient was started on a nitro drip and BiPAP- admitted to the MICU for this.    Of note, patient has multiple recent admissions for this as well. Where she is tachypneic, SOB requiring BiPAP as well as having HTN emergency with flash pulmonary edema.    Past Medical History:   Diagnosis Date    Bacteremia due to methicillin resistant Staphylococcus aureus 07/08/2024    Cardiac/pericardial tamponade 01/20/2024    CHF (congestive heart failure)     COPD (chronic obstructive pulmonary disease) (Multi)     Coronary artery disease     Disorder of sweat glands 02/25/2023    Dry eye syndrome of bilateral lacrimal glands 03/07/2017    Dry eyes    ESRD (end stage renal disease) (Multi)     Essential (primary) hypertension 12/27/2022    Hypertension    History of acute pancreatitis 12/21/2020    History of acute pancreatitis    Low grade  squamous intraepithelial lesion (LGSIL) on cervicovaginal cytologic smear 02/25/2023    Migraines     History of migraine    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    Type 2 myocardial infarction (Multi) 01/20/2024     Past Surgical History:   Procedure Laterality Date    APPENDECTOMY  03/07/2017    Appendectomy    CT ABDOMEN ANGIOGRAM W AND/OR WO IV CONTRAST  04/23/2023    CT ABDOMEN ANGIOGRAM W AND/OR WO IV CONTRAST [facility] CT    OTHER SURGICAL HISTORY  03/07/2017    Cystoscopy With Pyeloscopy With Removal Of Calculus    OTHER SURGICAL HISTORY  03/07/2017    Anoscopy For Polyp Removal    OTHER SURGICAL HISTORY  12/08/2021    Arteriovenous fistula creation procedure    OTHER SURGICAL HISTORY  12/08/2021    Dialysis tunneled catheter placement    TUBAL LIGATION  03/07/2017    Tubal Ligation     Family History   Problem Relation Name Age of Onset    Other (Cerebrovascular Accident) Father      Heart failure Paternal Grandmother      Breast cancer Paternal Grandmother      Other (Primary Cervical Cancer) Paternal Grandmother      Diabetes Paternal Grandfather      Breast cancer Father's Sister      Ovarian cancer Father's Sister       Social History     Tobacco Use    Smoking status: Former     Types: Cigarettes     Passive exposure: Past    Smokeless tobacco: Never   Vaping Use    Vaping status: Never Used   Substance Use Topics    Alcohol use: Not Currently    Drug use: Not Currently     Types: Cocaine, Marijuana       Review of Systems:  Review of Systems   Constitutional:  Negative for chills, fatigue and fever.   HENT:  Positive for congestion.    Respiratory:  Positive for cough and shortness of breath.    Cardiovascular:  Negative for chest pain and palpitations.   Gastrointestinal:  Positive for diarrhea.   Endocrine: Negative.    Genitourinary: Negative.    Musculoskeletal:  Positive for back pain.   Skin: Negative.    Neurological:  Positive for dizziness and light-headedness.   Hematological:  Negative.    Psychiatric/Behavioral: Negative.          Meds    Home medications:  Current Outpatient Medications   Medication Instructions    acetaminophen (TYLENOL) 650 mg, oral, Every 6 hours PRN    albuterol (ProAir HFA) 90 mcg/actuation inhaler 2 puffs, inhalation, Every 4 hours PRN    albuterol 1.25 mg, nebulization, Every 6 hours PRN    aspirin 81 mg, oral, Daily    atorvastatin (LIPITOR) 80 mg, oral, Nightly    calcium acetate (PHOSLO) 1,334 mg, oral, 3 times daily (morning, midday, late afternoon)    carvedilol (COREG) 25 mg, oral, 2 times daily    cloNIDine (CATAPRES) 0.2 mg, oral, Nightly    diclofenac sodium (VOLTAREN) 4 g, Topical, 4 times daily PRN    docusate sodium (COLACE) 100 mg, oral, 2 times daily PRN    Eliquis 5 mg, oral, 2 times daily    epoetin joceline (PROCRIT) 10,000 Units, subcutaneous, 3 times weekly    fluticasone (Flonase) 50 mcg/actuation nasal spray 2 sprays, Each Nostril, Daily, Shake gently. Before first use, prime pump. After use, clean tip and replace cap.    fluticasone furoate-vilanteroL (Breo Ellipta) 100-25 mcg/dose inhaler 1 puff, inhalation, Daily RT    gabapentin (NEURONTIN) 300 mg, oral, Nightly    lidocaine 4 % patch 1 patch, transdermal, Daily, Remove & discard patch within 12 hours or as directed by MD.    NIFEdipine ER (ADALAT CC) 90 mg, oral, Nightly, Do not crush, chew, or split.    pantoprazole (PROTONIX) 40 mg, oral, As needed, Do not crush, chew, or split.    polyethylene glycol (Glycolax, Miralax) 17 gram/dose powder Take 17 g (1 scoop dissolved in liquid) by mouth once daily.    SUMAtriptan (IMITREX) 25 mg, oral, Once as needed, May repeat dose once in 2 hours if no relief.  Do not exceed 2 doses in 24 hours.    torsemide (Demadex) 20 mg tablet Take 1 tablet after dialysis on dialysis days and twice daily on non dialysis days    valsartan (DIOVAN) 320 mg, oral, Every evening    vitamin B complex-vitamin C-folic acid (Nephrocaps) 1 mg capsule 1 capsule, oral, Daily  "       Inpatient medications:  Scheduled medications  [Held by provider] apixaban, 5 mg, oral, BID  [Held by provider] aspirin, 81 mg, oral, Daily  [Held by provider] atorvastatin, 80 mg, oral, Nightly  [Held by provider] calcium acetate, 1,334 mg, oral, TID  [Held by provider] carvedilol, 25 mg, oral, BID  [Held by provider] cloNIDine, 0.2 mg, oral, Nightly  [START ON 12/30/2024] fluticasone, 2 spray, Each Nostril, Daily  [START ON 12/30/2024] fluticasone furoate-vilanteroL, 1 puff, inhalation, Daily  [START ON 12/30/2024] lidocaine, 1 patch, transdermal, Daily  [Held by provider] NIFEdipine ER, 90 mg, oral, Nightly  oxygen, , inhalation, Continuous - Inhalation  [Held by provider] polyethylene glycol, 17 g, oral, Daily  [Held by provider] valsartan, 320 mg, oral, q PM  [Held by provider] vitamin B complex-vitamin C-folic acid, 1 capsule, oral, Daily      Continuous medications  nitroglycerin, 0-300 mcg/min, Last Rate: 260 mcg/min (12/29/24 2225)      PRN medications  PRN medications: diclofenac sodium     Objective    VITALS  Visit Vitals  BP (!) 202/88   Pulse 80   Temp 36.5 °C (97.7 °F) (Temporal)   Resp 19   Ht 1.397 m (4' 7\")   Wt 57.9 kg (127 lb 10.3 oz)   SpO2 100%   BMI 29.67 kg/m²   OB Status Postmenopausal   Smoking Status Former   BSA 1.5 m²        Physical Exam  Constitutional:       General: She is not in acute distress.     Appearance: Normal appearance.   HENT:      Head: Normocephalic and atraumatic.   Eyes:      General: No scleral icterus.     Extraocular Movements: Extraocular movements intact.      Pupils: Pupils are equal, round, and reactive to light.   Cardiovascular:      Rate and Rhythm: Normal rate and regular rhythm.   Pulmonary:      Effort: Pulmonary effort is normal. No respiratory distress.   Abdominal:      General: Abdomen is flat. Bowel sounds are normal.      Palpations: Abdomen is soft.      Tenderness: There is no abdominal tenderness.   Musculoskeletal:      Right lower leg: No " "edema.      Left lower leg: No edema.   Skin:     General: Skin is warm and dry.   Neurological:      General: No focal deficit present.      Mental Status: She is alert.   Psychiatric:         Mood and Affect: Mood normal.        Intake/Output Summary (Last 24 hours) at 12/29/2024 2231  Last data filed at 12/29/2024 2225  Gross per 24 hour   Intake 265.25 ml   Output --   Net 265.25 ml     General Chemistry Labs  Results from last 72 hours   Lab Units 12/29/24  1845   GLUCOSE mg/dL 80   SODIUM mmol/L 141   POTASSIUM mmol/L 5.0   CHLORIDE mmol/L 99   CO2 mmol/L 28   BUN mg/dL 42*   CREATININE mg/dL 8.38*   ANION GAP mmol/L 19   EGFR mL/min/1.73m*2 5*   CALCIUM mg/dL 10.5   MAGNESIUM mg/dL 2.55*   ALBUMIN g/dL 4.6   ALK PHOS U/L 137*   ALT U/L 16   AST U/L 19   BILIRUBIN TOTAL mg/dL 0.5   PROTEIN TOTAL g/dL 8.0      CBC  Results from last 72 hours   Lab Units 12/29/24  1845   WBC AUTO x10*3/uL 6.2   HEMOGLOBIN g/dL 14.1   HEMATOCRIT % 42.6   MCV fL 89   MCH pg 29.5   MCHC g/dL 33.1   RDW % 15.9*   PLATELETS AUTO x10*3/uL 200   NEUTROS PCT AUTO % 63.4   LYMPHS PCT AUTO % 18.8   MONOS PCT AUTO % 5.3   EOS PCT AUTO % 11.1     Coagulation Labs        No lab exists for component: \"RETICHBG\"  Cardiac Labs  Results from last 72 hours   Lab Units 12/29/24  1845   TROPHSCMC ng/L 84*   BNP pg/mL 2,319*     MELD 3.0: 22 at 11/11/2024  4:14 AM  MELD-Na: 23 at 11/11/2024  4:14 AM  Calculated from:  Serum Creatinine: 8.23 mg/dL (Using max of 3 mg/dL) at 11/11/2024  4:14 AM  Serum Sodium: 139 mmol/L (Using max of 137 mmol/L) at 11/11/2024  4:14 AM  Total Bilirubin: 0.5 mg/dL (Using min of 1 mg/dL) at 11/9/2024  3:20 AM  Serum Albumin: 3.6 g/dL (Using max of 3.5 g/dL) at 11/11/2024  4:14 AM  INR(ratio): 1.3 at 11/9/2024  8:29 AM  Age at listing (hypothetical): 54 years  Sex: Female at 11/11/2024  4:14 AM    Blood Gases      Results from last 72 hours   Lab Units 12/29/24 2008 12/29/24  1845   POCT PH, VENOUS pH 7.32* 7.40   POCT " PCO2, VENOUS mm Hg 52* 51   POCT PO2, VENOUS mm Hg 47* 49*   POCT HCO3 CALCULATED, VENOUS mmol/L 26.8* 31.6*   POCT LACTATE, VENOUS mmol/L 0.5 1.0      Micro/ID:   Lab Results   Component Value Date    URINECULTURE NO SIGNIFICANT GROWTH. 07/09/2023    BLOODCULT  08/21/2023     No Growth at 1 days~No Growth at 2 days~No Growth at 3 days~NO GROWTH at 4 days - FINAL REPORT         Summary of key imaging results from the last 24 hours    Assessment and Plan   Assessment:  54 y.o. 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    In the ED, patient was HTN to 256/110 HR 98 tachypneic to 22. Labs demonstrated: RFP: Cr 8.38 BUN 42 / BNP 2319 / trop 84. VBG demonstrated pH 7.32 CO2 52 pO2 47 lactate 0.5. CXR demonstrated mild interstitial pulmonary edema. Patient was started on a nitro drip and BiPAP. C/F Hypertensive emergency, flash pulmonary edema - consulted nephrology for urgent UF, wean nitro gtt as tolerated.    Mechanical Ventilation: none  Sedation/Analgesia:  none  Restraints: no    Plan:  NEUROLOGY/PSYCH:  #Peripheral Neuropathy  -restart home gabapentin when able to take PO Medications    CARDIOVASCULAR:  #HTN Emergency C/B Flash Pulmonary Edema  #HFpEF  #Troponemia 2/2 T2 MI iso HTN, ESRD  ::home GDMT/BP medications: carvedilol 25 mg BID, clonidine 0.2 mg at bedtime, nifedipine ER 90 mg at bedtime, torsemide 20 mg on HD/BID on nHD days, valsartan 320 mg QHS  -wean nitro drip as tolerated, if after HD still not meeting goal and having side effects will transition to cardene gtt goal -180s  -restart home medications in the AM once able to wean BiPAP and take PO  -continue home ASA  -trend troponin till peak  -would benefit from GDMT optimization including Entresto/Jardiance     #Hx of DVT  -restart home Eliquis 5 mg BID when able to take  PO -> potentially can be discontinued as >6 months therapy has been completed iso provoked DVT    #HLD  -continue home statin: atorvastatin 80 mg QHS    PULMONARY:  #AHRF  #Flash Pulmonary Edema  -continue BP control as above, nephrology consulted for urgent iHD tonight  -wean BiPAP as tolerated    #COPD  -continue home inhalers: albuterol PRN, Breo Ellipta daily     RENAL/GENITOURINARY:  #ESRD on iHD T/TH/SAT  -restart home nephrocaps/phoslo once able to tolerate orals  -restart home torsemide once able to take orals  -nephrology consulted, appreicate recommendations: plan for urgent HD, will restart home Epogen at their discretion    GASTROENTEROLOGY:  #GERD  -continue home Pantoprazole 40 mg PRN    ENDOCRINOLOGY:  No acute concerns currently- hypoglycemia protocol    HEMATOLOGY:  #Hx of LUE DVT  ::provoked iso PICC, brachial DVT  -continue with Eliquis 5 mg BID, potentially can be discontinued as >6 months therapy has been completed iso provoked DVT    #Anemia of Chronic Disease  -c/w Epogen TID w/ HD    MUSCULOSKELETAL/ SKIN:  No acute concerns currently    INFECTIOUS DISEASE:  No acute concerns currently    ICU Check List   FEN  Fluids: overload, cautious  Electrolytes: Will replete PRN, with goals of Mg >2, K>4  Nutrition: nephro diet  Prophylaxis:  DVT ppx: Eliquis  GI ppx/Bowel care: Pantoprazole/Miralax         Social:  Code: Full Code    HPOA: Hai Pinedo (Significant Other)  [phone] (Mobile)     Anita Gutierrez MD  PGY-2 Internal Medicine  Disclaimer: Documentation completed with the information available at the time of input. The times in the chart may not be reflective of actual patient care times, interventions, or procedures. Documentation occurs after the physical care of the patient.

## 2024-12-31 LAB
25(OH)D3 SERPL-MCNC: 9 NG/ML (ref 30–100)
ALBUMIN SERPL BCP-MCNC: 3.9 G/DL (ref 3.4–5)
ANION GAP SERPL CALC-SCNC: 17 MMOL/L (ref 10–20)
BASOPHILS # BLD AUTO: 0.07 X10*3/UL (ref 0–0.1)
BASOPHILS NFR BLD AUTO: 1.4 %
BUN SERPL-MCNC: 29 MG/DL (ref 6–23)
CALCIUM SERPL-MCNC: 9.7 MG/DL (ref 8.6–10.6)
CHLORIDE SERPL-SCNC: 95 MMOL/L (ref 98–107)
CO2 SERPL-SCNC: 30 MMOL/L (ref 21–32)
CREAT SERPL-MCNC: 6.87 MG/DL (ref 0.5–1.05)
EGFRCR SERPLBLD CKD-EPI 2021: 7 ML/MIN/1.73M*2
EOSINOPHIL # BLD AUTO: 0.48 X10*3/UL (ref 0–0.7)
EOSINOPHIL NFR BLD AUTO: 9.9 %
ERYTHROCYTE [DISTWIDTH] IN BLOOD BY AUTOMATED COUNT: 16.1 % (ref 11.5–14.5)
GLUCOSE SERPL-MCNC: 91 MG/DL (ref 74–99)
HCT VFR BLD AUTO: 40.9 % (ref 36–46)
HGB BLD-MCNC: 12.2 G/DL (ref 12–16)
IMM GRANULOCYTES # BLD AUTO: 0.02 X10*3/UL (ref 0–0.7)
IMM GRANULOCYTES NFR BLD AUTO: 0.4 % (ref 0–0.9)
LYMPHOCYTES # BLD AUTO: 1.15 X10*3/UL (ref 1.2–4.8)
LYMPHOCYTES NFR BLD AUTO: 23.7 %
MAGNESIUM SERPL-MCNC: 2.38 MG/DL (ref 1.6–2.4)
MCH RBC QN AUTO: 28.4 PG (ref 26–34)
MCHC RBC AUTO-ENTMCNC: 29.8 G/DL (ref 32–36)
MCV RBC AUTO: 95 FL (ref 80–100)
MONOCYTES # BLD AUTO: 0.56 X10*3/UL (ref 0.1–1)
MONOCYTES NFR BLD AUTO: 11.5 %
NEUTROPHILS # BLD AUTO: 2.57 X10*3/UL (ref 1.2–7.7)
NEUTROPHILS NFR BLD AUTO: 53.1 %
NRBC BLD-RTO: 0 /100 WBCS (ref 0–0)
PHOSPHATE SERPL-MCNC: 7 MG/DL (ref 2.5–4.9)
PLATELET # BLD AUTO: 188 X10*3/UL (ref 150–450)
POTASSIUM SERPL-SCNC: 4.6 MMOL/L (ref 3.5–5.3)
RBC # BLD AUTO: 4.29 X10*6/UL (ref 4–5.2)
SODIUM SERPL-SCNC: 137 MMOL/L (ref 136–145)
WBC # BLD AUTO: 4.9 X10*3/UL (ref 4.4–11.3)

## 2024-12-31 PROCEDURE — 2500000001 HC RX 250 WO HCPCS SELF ADMINISTERED DRUGS (ALT 637 FOR MEDICARE OP)

## 2024-12-31 PROCEDURE — 83735 ASSAY OF MAGNESIUM: CPT

## 2024-12-31 PROCEDURE — 2500000002 HC RX 250 W HCPCS SELF ADMINISTERED DRUGS (ALT 637 FOR MEDICARE OP, ALT 636 FOR OP/ED)

## 2024-12-31 PROCEDURE — 2500000004 HC RX 250 GENERAL PHARMACY W/ HCPCS (ALT 636 FOR OP/ED)

## 2024-12-31 PROCEDURE — 2500000005 HC RX 250 GENERAL PHARMACY W/O HCPCS

## 2024-12-31 PROCEDURE — 80069 RENAL FUNCTION PANEL: CPT

## 2024-12-31 PROCEDURE — 99233 SBSQ HOSP IP/OBS HIGH 50: CPT

## 2024-12-31 PROCEDURE — 85025 COMPLETE CBC W/AUTO DIFF WBC: CPT

## 2024-12-31 PROCEDURE — 2500000004 HC RX 250 GENERAL PHARMACY W/ HCPCS (ALT 636 FOR OP/ED): Mod: JZ,TB

## 2024-12-31 PROCEDURE — 1200000002 HC GENERAL ROOM WITH TELEMETRY DAILY

## 2024-12-31 PROCEDURE — 94640 AIRWAY INHALATION TREATMENT: CPT

## 2024-12-31 PROCEDURE — 36415 COLL VENOUS BLD VENIPUNCTURE: CPT

## 2024-12-31 PROCEDURE — 99221 1ST HOSP IP/OBS SF/LOW 40: CPT | Performed by: STUDENT IN AN ORGANIZED HEALTH CARE EDUCATION/TRAINING PROGRAM

## 2024-12-31 RX ORDER — DIPHENHYDRAMINE HCL 25 MG
25 CAPSULE ORAL ONCE AS NEEDED
Status: COMPLETED | OUTPATIENT
Start: 2024-12-31 | End: 2024-12-31

## 2024-12-31 RX ORDER — CARVEDILOL 12.5 MG/1
12.5 TABLET ORAL ONCE
Status: DISCONTINUED | OUTPATIENT
Start: 2024-12-31 | End: 2024-12-31

## 2024-12-31 RX ORDER — SEVELAMER CARBONATE 800 MG/1
800 TABLET, FILM COATED ORAL
Status: DISPENSED | OUTPATIENT
Start: 2025-01-01

## 2024-12-31 RX ORDER — VALSARTAN 320 MG/1
320 TABLET ORAL ONCE
Status: COMPLETED | OUTPATIENT
Start: 2024-12-31 | End: 2024-12-31

## 2024-12-31 RX ORDER — CARVEDILOL 25 MG/1
25 TABLET ORAL 2 TIMES DAILY
Status: DISCONTINUED | OUTPATIENT
Start: 2024-12-31 | End: 2025-01-04

## 2024-12-31 RX ORDER — VALSARTAN 160 MG/1
320 TABLET ORAL EVERY EVENING
Status: DISPENSED | OUTPATIENT
Start: 2025-01-01

## 2024-12-31 RX ORDER — PETROLATUM 420 MG/G
OINTMENT TOPICAL 2 TIMES DAILY
Status: DISPENSED | OUTPATIENT
Start: 2024-12-31

## 2024-12-31 RX ORDER — ACETAMINOPHEN 10 MG/ML
1000 INJECTION, SOLUTION INTRAVENOUS ONCE
Status: COMPLETED | OUTPATIENT
Start: 2024-12-31 | End: 2024-12-31

## 2024-12-31 RX ORDER — MAGNESIUM SULFATE 1 G/100ML
1 INJECTION INTRAVENOUS ONCE
Status: COMPLETED | OUTPATIENT
Start: 2024-12-31 | End: 2024-12-31

## 2024-12-31 RX ORDER — PRAMOXINE HYDROCHLORIDE 10 MG/ML
LOTION TOPICAL AS NEEDED
Status: DISPENSED | OUTPATIENT
Start: 2024-12-31

## 2024-12-31 RX ORDER — DIPHENHYDRAMINE HCL 25 MG
25 CAPSULE ORAL EVERY 6 HOURS PRN
Status: DISCONTINUED | OUTPATIENT
Start: 2024-12-31 | End: 2025-01-01

## 2024-12-31 RX ORDER — GABAPENTIN 100 MG/1
100 CAPSULE ORAL DAILY
Status: DISCONTINUED | OUTPATIENT
Start: 2024-12-31 | End: 2025-01-03

## 2024-12-31 RX ADMIN — CALCIUM ACETATE 1334 MG: 667 CAPSULE ORAL at 08:28

## 2024-12-31 RX ADMIN — DIPHENHYDRAMINE HYDROCHLORIDE 25 MG: 25 CAPSULE ORAL at 23:15

## 2024-12-31 RX ADMIN — SUMATRIPTAN 25 MG: 25 TABLET, FILM COATED ORAL at 11:03

## 2024-12-31 RX ADMIN — ACETAMINOPHEN 975 MG: 325 TABLET, FILM COATED ORAL at 07:18

## 2024-12-31 RX ADMIN — TORSEMIDE 20 MG: 20 TABLET ORAL at 08:28

## 2024-12-31 RX ADMIN — NIFEDIPINE 90 MG: 90 TABLET, FILM COATED, EXTENDED RELEASE ORAL at 20:28

## 2024-12-31 RX ADMIN — ASPIRIN 81 MG: 81 TABLET, COATED ORAL at 08:28

## 2024-12-31 RX ADMIN — CALCIUM ACETATE 1334 MG: 667 CAPSULE ORAL at 12:22

## 2024-12-31 RX ADMIN — ACETAMINOPHEN 1000 MG: 1000 INJECTION INTRAVENOUS at 09:15

## 2024-12-31 RX ADMIN — APIXABAN 5 MG: 5 TABLET, FILM COATED ORAL at 08:28

## 2024-12-31 RX ADMIN — FLUTICASONE FUROATE AND VILANTEROL TRIFENATATE 1 PUFF: 100; 25 POWDER RESPIRATORY (INHALATION) at 08:43

## 2024-12-31 RX ADMIN — Medication 2 L/MIN: at 23:17

## 2024-12-31 RX ADMIN — GABAPENTIN 100 MG: 100 CAPSULE ORAL at 12:22

## 2024-12-31 RX ADMIN — CLONIDINE HYDROCHLORIDE 0.2 MG: 0.1 TABLET ORAL at 08:28

## 2024-12-31 RX ADMIN — APIXABAN 5 MG: 5 TABLET, FILM COATED ORAL at 20:26

## 2024-12-31 RX ADMIN — TORSEMIDE 20 MG: 20 TABLET ORAL at 20:27

## 2024-12-31 RX ADMIN — CARVEDILOL 25 MG: 25 TABLET, FILM COATED ORAL at 20:26

## 2024-12-31 RX ADMIN — MAGNESIUM SULFATE HEPTAHYDRATE 1 G: 1 INJECTION, SOLUTION INTRAVENOUS at 12:22

## 2024-12-31 RX ADMIN — CARVEDILOL 25 MG: 25 TABLET, FILM COATED ORAL at 08:28

## 2024-12-31 RX ADMIN — SUMATRIPTAN 25 MG: 25 TABLET, FILM COATED ORAL at 23:15

## 2024-12-31 RX ADMIN — NICARDIPINE HYDROCHLORIDE 2.5 MG/HR: 0.2 INJECTION, SOLUTION INTRAVENOUS at 03:18

## 2024-12-31 RX ADMIN — CALCIUM ACETATE 1334 MG: 667 CAPSULE ORAL at 18:17

## 2024-12-31 RX ADMIN — VALSARTAN 320 MG: 320 TABLET, FILM COATED ORAL at 14:27

## 2024-12-31 RX ADMIN — ACETAMINOPHEN 975 MG: 325 TABLET, FILM COATED ORAL at 19:46

## 2024-12-31 RX ADMIN — ASCORBIC ACID, THIAMINE MONONITRATE,RIBOFLAVIN, NIACINAMIDE, PYRIDOXINE HYDROCHLORIDE, FOLIC ACID, CYANOCOBALAMIN, BIOTIN, CALCIUM PANTOTHENATE, 1 CAPSULE: 100; 1.5; 1.7; 20; 10; 1; 6000; 150000; 5 CAPSULE, LIQUID FILLED ORAL at 08:28

## 2024-12-31 RX ADMIN — ATORVASTATIN CALCIUM 80 MG: 80 TABLET, FILM COATED ORAL at 20:26

## 2024-12-31 RX ADMIN — DIPHENHYDRAMINE HYDROCHLORIDE 25 MG: 25 CAPSULE ORAL at 11:03

## 2024-12-31 ASSESSMENT — PAIN SCALES - GENERAL
PAINLEVEL_OUTOF10: 2
PAINLEVEL_OUTOF10: 8
PAINLEVEL_OUTOF10: 0 - NO PAIN
PAINLEVEL_OUTOF10: 6
PAINLEVEL_OUTOF10: 3
PAINLEVEL_OUTOF10: 4

## 2024-12-31 ASSESSMENT — COGNITIVE AND FUNCTIONAL STATUS - GENERAL
STANDING UP FROM CHAIR USING ARMS: A LITTLE
HELP NEEDED FOR BATHING: A LITTLE
DRESSING REGULAR UPPER BODY CLOTHING: A LITTLE
DRESSING REGULAR LOWER BODY CLOTHING: A LITTLE
PERSONAL GROOMING: A LITTLE
DAILY ACTIVITIY SCORE: 18
MOBILITY SCORE: 16
EATING MEALS: A LITTLE
MOVING TO AND FROM BED TO CHAIR: A LITTLE
CLIMB 3 TO 5 STEPS WITH RAILING: A LOT
TOILETING: A LITTLE
TURNING FROM BACK TO SIDE WHILE IN FLAT BAD: A LITTLE
MOVING FROM LYING ON BACK TO SITTING ON SIDE OF FLAT BED WITH BEDRAILS: A LITTLE
WALKING IN HOSPITAL ROOM: A LOT

## 2024-12-31 ASSESSMENT — PAIN DESCRIPTION - DESCRIPTORS
DESCRIPTORS: ACHING;CRAMPING
DESCRIPTORS: CRAMPING

## 2024-12-31 ASSESSMENT — PAIN - FUNCTIONAL ASSESSMENT
PAIN_FUNCTIONAL_ASSESSMENT: 0-10

## 2024-12-31 ASSESSMENT — ENCOUNTER SYMPTOMS
CHILLS: 0
FEVER: 0

## 2024-12-31 NOTE — HOSPITAL COURSE
Stacey Heath is a 54 y.o. 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 Pt who was admitted to the MICU on 12/29/24 for hypertensive emergency c/b flash pulmonary edema. She has recurrent admissions related to her dialysis and breathing. She was treated with cardene gtt and received UF and HD with nephrology. cardene gtt was weaned and patient resumed home anti-HTN regimen. The patient was transferred to medicine floor but had recurrent hypertensive emergency with bp to 270s requiring cardene infusion to resume. Has had severe sessions of HD and UF, planning to continue until closer to dry weight of 50kg. She had adjustments to her home antihypertensives.

## 2024-12-31 NOTE — CARE PLAN
Problem: Skin  Goal: Participates in plan/prevention/treatment measures  Outcome: Met  Goal: Prevent/manage excess moisture  Outcome: Met  Goal: Prevent/minimize sheer/friction injuries  Outcome: Met  Goal: Promote/optimize nutrition  Outcome: Met  Flowsheets (Taken 12/31/2024 1533)  Promote/optimize nutrition:   Consume > 50% meals/supplements   Monitor/record intake including meals  Goal: Promote skin healing  Outcome: Progressing   The patient's goals for the shift include      The clinical goals for the shift include pt SBP will remain below 180 without cardene gtt

## 2024-12-31 NOTE — NURSING NOTE
Scanned pt via ultrasound to attempt PIV placement. Pt only has Left arm available to use due to right arm fistula. No sustainable veins found via ultrasound on left arm. Pt will need alternative access. Bedside RN notified.

## 2024-12-31 NOTE — PROGRESS NOTES
"ICU to [facility] Transfer Summary     I:  ICU Admission Reason & Brief ICU Course:    Ms. Heath is a 54 y.o. female with PMH significant for ESRD 2/2 on dialysis T/Th/Sat via RUE AVF (last complete session 11/28/2024), HTN, HFpEF, COPD, brachial DVT on Eliquis who presented for [facility] ED for acute onset shortness of breath.     Patient reports that on morning of admission she acutely became short of breath that feels similar to the times she has had flash pulmonary edema. Per ED note, she stated she missed one session of dialysis- here she stated she never missed dialysis but that the holidays messed up her schedule from T/Th/Sat to M/Wed/Sat. Did endorse nausea w/o vomiting, headache since starting the nitro gtt. Denied CP/palpitations. Of note, patient has multiple recent admissions for this as well. Where she is tachypneic, SOB requiring BiPAP as well as HTN emergency c/b flash pulmonary edema.     In the ED, patient was hypertensive to 256/110 HR 98 tachypneic to 22. Labs demonstrated: RFP: Cr 8.38 BUN 42 / BNP 2319 / trop 84. VBG demonstrated pH 7.32 CO2 52 pO2 47 lactate 0.5. CXR demonstrated mild interstitial pulmonary edema. Patient was started on a nitro drip and BiPAP then admitted to the MICU.    Pt was then switched to Cardene gtt d/t headache associated with Nitroglycerin gtt. Nephrology was urgent consulted for HD and pt underwent UF during which the pt's BP dropped significantly to SBP 110s. The pt's then continued to downtrend and maintained at goal SBP <180.   Pt then underwent HD on 12/30/24 with removal of 2L and BP was down to SBP 160s while Cardene gtt was turned off. However, the pt then began to experience severe splitting HA described as \"worst headache of my life\" with photophobia and N/V and BP then began to increase up to 200s-250s systolic.  Exam did not show any focal neuro deficits. The pt's Cardene gtt was then restarted and then slowly titrated down ON to maintain goal SBP<180. The pt was " treated with IV tylenol for HA which helped to improve pain.    On morning of 12/31/24, pt's BP improved to goal parameters off Cardene gtt. Home Valsartan was then restarted and pt was deemed stable for transfer to floor.       C: Code Status/DPOA Info/Goals of Care/ACP Note    Full Code  DPOA/Contact Number: Rohini Piña daughter) [phone]    U: Unprescribing & Pertinent High-Risk Medications    Changes to home meds:        Anticoagulation: Eliquis 5mg BID    Antibiotics:   [x] N/A - no current planned antimicrobioals    P: Pending Tests at the Time of Transfer   [ ] Vit D level   [ ] iPTH  [ ] LE duplex US     A: Active consultants, including Rehab:   [x]  Subspecialty Consultants: Derm, Nephrology   [x]  PT  [x]  OT  []  SLP  []  Wound Care    U: Uncertainty Measure/Diagnostic Pause:    Working diagnosis at the time of transfer:  -Hypertensive Emergency   -Flash Pulmonary Edema      Diagnosis Degree of Certainty: 1. High degree of certainty about the clinical diagnosis.     S: Summary of Major Problems and To-Dos:   Ms. Heath is a 54 y.o. female with PMH significant for ESRD 2/2 on dialysis T/Th/Sat via RUE AVF (last complete session 11/28/2024), HTN, HFpEF, COPD, brachial DVT on Eliquis who presented for [facility] ED for acute onset shortness of breath.     In the ED, patient was HTN to 256/110 HR 98 tachypneic to 22. Labs demonstrated: RFP: Cr 8.38 BUN 42 / BNP 2319 / trop 84. VBG demonstrated pH 7.32 CO2 52 pO2 47 lactate 0.5. CXR demonstrated mild interstitial pulmonary edema. Patient was started on a nitro drip and BiPAP.   C/F Hypertensive emergency, flash pulmonary edema - consulted nephrology for urgent UF, initially on nitro gtt then switched to cardene gtt for BP control.      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 Per derm recs, ordered  pramoxine 1% lotion to affected areas prn for pruritus and petrolatum ointment to be applied to bilateral upper and lower extremities BID as moisturizer. If patient does not improve with pramoxine lotion, could consider addition of triamcinolone 0.1% cream twice daily x 14 days     Per Nephro recs:    -Started sevelamer and Dc'd calcium acetate  [ ] Ordered Vit D and iPTH levels and if PTH high consider starting calcitriol   [ ] Ordered LE doppler US to check for DVT     Other To-Do:   [ ] F/u further Derm recs/consult note   -Consider possible alternative IV access as peripheral access has been difficult (per Nephro Marcos catheter or tunneled PICC is preferred)  [ ] Need SW help with making daughter Rohini offmarkie POA/paperwork         NEUROLOGY/PSYCH:  #Peripheral Neuropathy  -Restarted home gabapentin 100mg daily        CARDIOVASCULAR:  #HTN Emergency c/b Flash Pulmonary Edema (improving)   #HFpEF  #Troponemia 2/2 T2 MI iso HTN, ESRD  :: Baseline home GDMT/BP medications: carvedilol 25 mg BID, clonidine 0.2 mg at bedtime, nifedipine ER 90 mg at bedtime, torsemide 20 mg on HD/BID on nHD days, valsartan 320 mg at bedtime  :: HS Trop peaked at 193, now downtrended   Plan:   -Wean Cardene drip as tolerated, if after HD still not meeting goal and having side effects will transition to cardene gtt goal -180s  -Holding home valsartan   -May benefit from GDMT optimization including Entresto/Jardiance  while admitted      #Hx of LUE Non-obstructive Basilic and DVT  -Restart home Eliquis 5 mg BID when able to take PO -> potentially can be discontinued as >6 months therapy has been completed iso provoked DVT  -Will c/w home Eliquis, can consider having pt f/u with PCP regarding DC of Eliquis after discharge     #HLD  -Continue home statin: atorvastatin 80 mg QHS         PULMONARY:  #AHRF (resolving)  #Flash Pulmonary Edema (resolved)  -Continue BP control as above, nephrology consulted for HD. Pt underwent UF but had significant BP drop  -On BiPAP      #COPD  -Continue home inhalers: albuterol PRN, Breo Ellipta daily         RENAL/GENITOURINARY:  #ESRD on iHD Tu/Th/SAT  Plan:   -Restarted home nephrocaps/phoslo   -Restarted home torsemide (20mg   -Nephrology consulted, appreciate recommendations: underwent UF but experienced large drop in BP, can consider restarting home Epogen at their discretion  -Restarted home Torsemide 20mg after HD on dialysis days. Can consider resuming 20mg BID on non-HD days      #Hyperkalemia (resolved)   :: Found to be hyperkalemic to 5.7 with peaked T waves on EKG  on admission likely 2/2 missing HD   :: Received 1 dose Lokelma with f/u RFP showing K 5.2   Plan:   -Daily RFPs         GASTROENTEROLOGY:  #GERD  -Continue home Pantoprazole 40 mg PRN        ENDOCRINOLOGY:  :: Last A1c 4.7 11/9/24   -ZAIN   -Hypoglycemia protocol        HEMATOLOGY:  #Hx of Non-obstructive LUE Brachial V and Basilic V DVT (4/14/2024)  :: Per chart review noted to be provoked I/s/o PICC placement  :: Baseline on Eliquis 5mg BID  Plan:   -Continue with Eliquis 5 mg BID, potentially can be discontinued as >6 months therapy has been completed iso provoked DVT     #Anemia of Chronic Disease  -C/w Epogen TID w/ HD     MUSCULOSKELETAL/ SKIN:  No acute concerns currently     INFECTIOUS DISEASE:  No acute concerns currently       FEN  Fluids: PRN, overloaded on admission, cautious  Electrolytes: Will replete PRN, with goals of Mg >2, K>4  Nutrition: Cardio/Nephro diet  Prophylaxis:  DVT ppx: Eliquis 5mg BID   GI ppx/Bowel care: Pantoprazole/Miralax        Social:  Code: Full Code    HPOA: Hai Pinedo (Significant Other)  [phone] (Mobile)        To-do list for discharge:  [ ] Have pt f/u with PCP regarding possible D/C of Eliquis given LUE DVT was provoked I/s/o PICC line  placement and has been on AC since 4/14/24, can consider DC   [ ] Needs assistance w/ paperwork for making daughter Rohini WATT (w/ SW)        E: Exam, including Lines/Drains/Airways & Data Review:     Physical Exam  Vitals and nursing note reviewed.   Constitutional:       General: She is awake. She is not in acute distress.     Interventions: Nasal cannula in place.      Comments: Satting % on 2L O2 via NC    HENT:      Head: Normocephalic and atraumatic.      Mouth/Throat:      Mouth: Mucous membranes are dry.   Cardiovascular:      Rate and Rhythm: Normal rate and regular rhythm.      Heart sounds: Murmur heard.      Systolic murmur is present with a grade of 3/6.   Pulmonary:      Effort: Pulmonary effort is normal. No accessory muscle usage, prolonged expiration or respiratory distress.   Musculoskeletal:      Right lower leg: No edema.      Left lower leg: No edema.   Skin:     General: Skin is warm and dry.      Findings: Rash present. Rash is papular.      Comments: B/l LE's with papular pruritus rash of b/l forearms, non bleeding, non draining,   Neurological:      General: No focal deficit present.      Mental Status: She is alert. Mental status is at baseline.      Cranial Nerves: No dysarthria or facial asymmetry.   Psychiatric:         Behavior: Behavior is cooperative.        Difficult airway? N/A  Lines/drains assessed for removal? pIVs    Within 30 minutes of the patient physically leaving the floor, a Floor Readiness Note needs to be placed with updated vitals.

## 2024-12-31 NOTE — PROGRESS NOTES
Stacey Heath   54 y.o.    @WT@  MRN/Room: [id number]/25/25-A  DOA: 12/29/2024      SUBJECTIVE: I have seen and examined Stacey CHRISTY Kumar at the bedside.   Stacey Heath charts and 24 hrs events reviewed.      OBJECTIVE:  Temp:  [35 °C (95 °F)-37 °C (98.6 °F)] 37 °C (98.6 °F)  Heart Rate:  [63-92] 83  Resp:  [0-20] 12  BP: (118-236)/() 174/153  FiO2 (%):  [35 %] 35 %     Intake/Output Summary (Last 24 hours) at 12/31/2024 1547  Last data filed at 12/31/2024 1427  Gross per 24 hour   Intake 1492.5 ml   Output 2019 ml   Net -526.5 ml      I/O last 3 completed shifts:  In: 3149.2 (54.4 mL/kg) [P.O.:120; I.V.:2129.2 (36.8 mL/kg); Other:800; IV Piggyback:100]  Out: 4241 (73.2 mL/kg) [Emesis/NG output:175; Other:4066]  Weight: 57.9 kg        General appearance: no distress  NVB with dec air entry at bases  RUE AVF    Meds:   apixaban, 5 mg, BID  aspirin, 81 mg, Daily  atorvastatin, 80 mg, Nightly  calcium acetate, 1,334 mg, TID  carvedilol, 25 mg, BID  cloNIDine, 0.2 mg, q AM  fluticasone, 2 spray, Daily  fluticasone furoate-vilanteroL, 1 puff, Daily  gabapentin, 100 mg, Daily  lidocaine, 1 patch, Daily  NIFEdipine ER, 90 mg, Nightly  oxygen, , Continuous - Inhalation  polyethylene glycol, 17 g, Daily  torsemide, 20 mg, Every Mon/Wed/Fri  torsemide, 20 mg, 2 times per day on Sunday Tuesday Thursday Saturday  [START ON 1/1/2025] valsartan, 320 mg, q PM  vitamin B complex-vitamin C-folic acid, 1 capsule, Daily      niCARdipine, Last Rate: Stopped (12/31/24 0907)      acetaminophen, 975 mg, TID PRN  albuterol, 2.5 mg, q6h PRN  diclofenac sodium, 4 g, 4x daily PRN  ipratropium-albuteroL, 3 mL, q6h PRN  ondansetron, 4 mg, q8h PRN  SUMAtriptan, 25 mg, q2h PRN        Current Outpatient Medications   Medication Instructions    acetaminophen (TYLENOL) 650 mg, oral, Every 6 hours PRN    albuterol (ProAir HFA) 90 mcg/actuation inhaler 2 puffs, inhalation, Every 4 hours PRN    albuterol 1.25 mg, nebulization, Every 6 hours PRN     aspirin 81 mg, oral, Daily    atorvastatin (LIPITOR) 80 mg, oral, Nightly    calcium acetate (PHOSLO) 1,334 mg, oral, 3 times daily (morning, midday, late afternoon)    carvedilol (COREG) 25 mg, oral, 2 times daily    cloNIDine (CATAPRES) 0.2 mg, oral, Nightly    diclofenac sodium (VOLTAREN) 4 g, Topical, 4 times daily PRN    docusate sodium (COLACE) 100 mg, oral, 2 times daily PRN    Eliquis 5 mg, oral, 2 times daily    epoetin joceline (PROCRIT) 10,000 Units, subcutaneous, 3 times weekly    fluticasone (Flonase) 50 mcg/actuation nasal spray 2 sprays, Each Nostril, Daily, Shake gently. Before first use, prime pump. After use, clean tip and replace cap.    fluticasone furoate-vilanteroL (Breo Ellipta) 100-25 mcg/dose inhaler 1 puff, inhalation, Daily RT    gabapentin (NEURONTIN) 300 mg, oral, Nightly    lidocaine 4 % patch 1 patch, transdermal, Daily, Remove & discard patch within 12 hours or as directed by MD.    NIFEdipine ER (ADALAT CC) 90 mg, oral, Nightly, Do not crush, chew, or split.    pantoprazole (PROTONIX) 40 mg, oral, As needed, Do not crush, chew, or split.    polyethylene glycol (Glycolax, Miralax) 17 gram/dose powder Take 17 g (1 scoop dissolved in liquid) by mouth once daily.    SUMAtriptan (IMITREX) 25 mg, oral, Once as needed, May repeat dose once in 2 hours if no relief.  Do not exceed 2 doses in 24 hours.    torsemide (Demadex) 20 mg tablet Take 1 tablet after dialysis on dialysis days and twice daily on non dialysis days    valsartan (DIOVAN) 320 mg, oral, Every evening    vitamin B complex-vitamin C-folic acid (Nephrocaps) 1 mg capsule 1 capsule, oral, Daily       Investigations:  Results from last 7 days   Lab Units 12/31/24  0529   WBC AUTO x10*3/uL 4.9   RBC AUTO x10*6/uL 4.29   HEMOGLOBIN g/dL 12.2   HEMATOCRIT % 40.9     Results from last 7 days   Lab Units 12/31/24  0529 12/30/24  0657   SODIUM mmol/L 137 138   POTASSIUM mmol/L 4.6 5.2   CHLORIDE mmol/L 95* 95*   CO2 mmol/L 30 27   BUN mg/dL  "29* 47*   CREATININE mg/dL 6.87* 9.22*   CALCIUM mg/dL 9.7 9.7   PHOSPHORUS mg/dL 7.0* 8.3*   MAGNESIUM mg/dL 2.38 2.54*   BILIRUBIN TOTAL mg/dL  --  0.5   ALT U/L  --  15   AST U/L  --  16         No results found for: \"ALBUR\", \"[id number]\"   No results found for the last 90 days.      Imaging:  CT head wo IV contrast    Result Date: 12/31/2024  Interpreted By:  Shabbir Romreo and Sheng Max STUDY: CT HEAD WO IV CONTRAST;  12/30/2024 9:15 pm   INDICATION: Signs/Symptoms:worse headache of her life, hypertensive emergency.     COMPARISON: CT head 10/21/2024, 10/07/2024, 09/17/2024   ACCESSION NUMBER(S): [id number]   ORDERING CLINICIAN: FRANCA STANLEY   TECHNIQUE: Noncontrast axial CT scan of head was performed. Angled reformats in brain and bone windows were generated. The images were reviewed in bone, brain, blood and soft tissue windows.   FINDINGS: CT HEAD:   CSF SPACES: The ventricles, sulci and basal cisterns are within normal limits. There is no extraaxial fluid collection.   PARENCHYMA: The grey-white differentiation is intact. There is no mass effect or midline shift.  There is no intracranial hemorrhage. Mild hypoattenuation in the bilateral periventricular white matter and centrum semiovale is non-specific and likely relate to sequela from chronic small vessel ischemic disease given patient's age.   CALVARIUM: The calvarium is unremarkable.   PARANASAL SINUSES AND MASTOIDS: Visualized paranasal sinuses and mastoids are clear. Hypertrophied left inferior nasal turbinate partially protrudes into the nasopharynx, extending outside the field of view.   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 Moderate circumferential calcific atherosclerosis of the bilateral carotid siphons. Postsurgical changes of bilateral lens replacements.       1. No acute intracranial abnormality. 2. Mild hypoattenuation in the bilateral periventricular white matter and centrum semiovale is non-specific and likely relate to sequela " from chronic small vessel ischemic disease given patient's age.     I personally reviewed the images/study and I agree with the findings as stated by Dr. Gee Goodman. This study was interpreted at [facility], [place], [place].   MACRO: None   Signed by: Shabbir Romero 12/31/2024 7:13 AM Dictation workstation:   [id number]    Electrocardiogram, 12-lead PRN ACS symptoms    Result Date: 12/30/2024  Sinus tachycardia Right atrial enlargement Left ventricular hypertrophy with repolarization abnormality Abnormal ECG When compared with ECG of 30-DEC-2024 02:13, Vent. rate has increased BY  40 BPM ST depression has replaced ST elevation in Inferior leads    Electrocardiogram, 12-lead PRN ACS symptoms    Result Date: 12/30/2024  Normal sinus rhythm Possible Left atrial enlargement Left ventricular hypertrophy with repolarization abnormality Abnormal ECG When compared with ECG of 29-DEC-2024 21:36, No significant change was found    ECG 12 lead    Result Date: 12/30/2024  Normal sinus rhythm Right atrial enlargement Left ventricular hypertrophy with repolarization abnormality ( Sokolow-Gomez ) Abnormal ECG When compared with ECG of 06-DEC-2024 03:34, Non-specific change in ST segment in Anterior leads See ED provider note for full interpretation and clinical correlation Confirmed by Aleta Atkinson ([id number]) on 12/30/2024 2:48:34 AM    XR chest 1 view    Result Date: 12/29/2024  Interpreted By:  Deonna Hawkins, STUDY: Chest, single AP view.   INDICATION: Signs/Symptoms:flash pulmonary edema.   COMPARISON: 12/03/2024   ACCESSION NUMBER(S): [id number]   ORDERING CLINICIAN: JAYDA AMAYA   FINDINGS: The cardiac silhouette size is within normal limits. There is no focal consolidation or pneumothorax. No sizeable pleural effusion. No acute osseous abnormality. Linear atelectasis of the left lung base. Vascular stent in the right axilla. Mild diffuse prominence of the interstitial lung markings suggesting  pulmonary edema.       1. Unchanged findings of mild interstitial pulmonary edema   MACRO: None.   Signed by: Deonna Hawkins 12/29/2024 7:23 PM Dictation workstation:   [id number]       ASSESSMENT:  Stacey Heath is a 54 y.o. female with PMHx of ESKD on dialysis (TTS via RUE AVF), HTN, HFpEF, COPD (no o2), brachial DVT on Eliquis presented with SOB 2/2 hypertensive emergency & Pulmonary edema. She did not miss any treatments or medications. Nephrology consulted for management of ESKD.       #ESKD on HD TTS  - HD unit: [facility]   - Dr Barraza  - Access: RUE AVF   - High PTH in 1400 from May 2024      RECOMMENDATIONS:  - please start sevelamer instead of calcium acetate  - get Vit D and iPTH levels and if PTH high consider starting calcitriol   - get LE USG to check for DVT  - Keep MAP >70 or SBP >100-120, avoid nephrotoxic medications, radiocontrast if possible, follow medication trough levels as appropriate and titrate the nephrotoxic medications according to GFR.  - Strict I/O monitoring, daily weights, daily BMP  - No absolute indication for emergent dialysis for now/will need kidney replacement therapy if continues to have worsening of creatinine with oliguria/persistent hyperkalemia/metabolic acidosis/worsening volume status  - Will continue to follow    Patient is discussed with the attending.         Jo Wade MD  Nephrology Fellow   Daytime / Weekend Renal Pager [phone]  After 7 pm Emergencies [phone] Pager [phone]

## 2024-12-31 NOTE — CONSULTS
DERMATOLOGY DEPARTMENT CONSULTATION NOTE  Name: Stacey Heath  MRN: [id number]  : 1970    Reason for consultation: pruritus nodules on bilateral forearms    History of Present Illness  Stacey Heath is a 54 y.o. 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 Dermatology was consulted for pruritic nodules on bilateral forearms.    Per patient she also has a history of atopic dermatitis.  She notes the itchy papules came up on her bilateral arms and buttocks within the last few days.  She finds it difficult not to scratch them.  She denies pain associated with the lesions, and currently otherwise feels well.    Review of Systems  Review of Systems   Constitutional:  Negative for chills and fever.   Skin:  Positive for rash.        Past Medical History  Past Medical History:   Diagnosis Date    Bacteremia due to methicillin resistant Staphylococcus aureus 2024    Cardiac/pericardial tamponade 2024    CHF (congestive heart failure)     COPD (chronic obstructive pulmonary disease) (Multi)     Coronary artery disease     Disorder of sweat glands 2023    Dry eye syndrome of bilateral lacrimal glands 2017    Dry eyes    ESRD (end stage renal disease) (Multi)     Essential (primary) hypertension 2022    Hypertension    History of acute pancreatitis 2020    History of acute pancreatitis    Low grade squamous intraepithelial lesion (LGSIL) on cervicovaginal cytologic smear 2023    Migraines     History of migraine    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     Allergies  Allergies   Allergen Reactions    Iodine Hives, Itching and Unknown    Bee Pollen Unknown    Bioflavonoids Swelling    Citrus And Derivatives Unknown    Codeine Itching, Hives and Unknown     Tolerates percocet   Tolerates percocet    Tolerates percocet    Flowers Itching    Gadolinium-Containing Contrast Media Unknown    Shellfish Containing Products Swelling     SEAFOOD       Medications  Scheduled Meds: apixaban, 5 mg, oral, BID  aspirin, 81 mg, oral, Daily  atorvastatin, 80 mg, oral, Nightly  calcium acetate, 1,334 mg, oral, TID  carvedilol, 25 mg, oral, BID  cloNIDine, 0.2 mg, oral, q AM  fluticasone, 2 spray, Each Nostril, Daily  fluticasone furoate-vilanteroL, 1 puff, inhalation, Daily  gabapentin, 100 mg, oral, Daily  lidocaine, 1 patch, transdermal, Daily  NIFEdipine ER, 90 mg, oral, Nightly  oxygen, , inhalation, Continuous - Inhalation  polyethylene glycol, 17 g, oral, Daily  torsemide, 20 mg, oral, Every Mon/Wed/Fri  torsemide, 20 mg, oral, 2 times per day on Sunday Tuesday Thursday Saturday  [START ON 1/1/2025] valsartan, 320 mg, oral, q PM  vitamin B complex-vitamin C-folic acid, 1 capsule, oral, Daily       Continuous Infusions: niCARdipine, 2.5-15 mg/hr, Last Rate: Stopped (12/31/24 0907)       PRN Meds: PRN medications: acetaminophen, albuterol, diclofenac sodium, ipratropium-albuteroL, ondansetron, SUMAtriptan     Family History  Family History   Problem Relation Name Age of Onset    Other (Cerebrovascular Accident) Father      Heart failure Paternal Grandmother      Breast cancer Paternal Grandmother      Other (Primary Cervical Cancer) Paternal Grandmother      Diabetes Paternal Grandfather      Breast cancer Father's Sister      Ovarian cancer Father's Sister         Social History   reports that she has quit smoking. Her smoking use included cigarettes. She has  been exposed to tobacco smoke. She has never used smokeless tobacco. She reports that she does not currently use alcohol. She reports that she does not currently use drugs after having used the following drugs: Cocaine and Marijuana.     Objective    Vitals:    12/31/24 1200 12/31/24 1300 12/31/24 1400 12/31/24 1500   BP: (!) 186/131  (!) 146/99 (!) 174/153   Pulse: 77 77 87 83   Resp: 12 17 20 12   Temp: 37 °C (98.6 °F)      TempSrc:       SpO2: 100% 100% 100% 95%   Weight:       Height:            Exam    GEN: no acute distress  NEURO: moving all extremities   EYES: conjunctiva and eyelids normal. No conjunctival injection or erosions appreciated  ENT:   - Lips: normal  NECK: normal and symmetric.   CV: no varicosities, warmth or tenderness of extremities.  GI: Flat abdomen.   EXTREMITIES: no distal digital clubbing, cyanosis, petechiae   SKIN: A full body skin exam including scalp, face, eyes, ears, neck, trunk, bilateral upper & lower extremities, toenails and fingernails were examined with the following findings:  - On the bilateral dorsal hands, wrists, and the left upper arm, there are few scattered crusted erosions, some in a linear configuration with areas of hyperpigmenation  - On the upper back, there are hyperpigmented macules and few excoriated papules  - On the bilateral lower legs, there is diffuse fine ashy scale  - On the bilateral buttocks, there is flaking scale      Laboratory and Data  Results for orders placed or performed during the hospital encounter of 12/29/24 (from the past 24 hours)   CBC and Auto Differential   Result Value Ref Range    WBC 4.9 4.4 - 11.3 x10*3/uL    nRBC 0.0 0.0 - 0.0 /100 WBCs    RBC 4.29 4.00 - 5.20 x10*6/uL    Hemoglobin 12.2 12.0 - 16.0 g/dL    Hematocrit 40.9 36.0 - 46.0 %    MCV 95 80 - 100 fL    MCH 28.4 26.0 - 34.0 pg    MCHC 29.8 (L) 32.0 - 36.0 g/dL    RDW 16.1 (H) 11.5 - 14.5 %    Platelets 188 150 - 450 x10*3/uL    Neutrophils % 53.1 40.0 - 80.0 %     Immature Granulocytes %, Automated 0.4 0.0 - 0.9 %    Lymphocytes % 23.7 13.0 - 44.0 %    Monocytes % 11.5 2.0 - 10.0 %    Eosinophils % 9.9 0.0 - 6.0 %    Basophils % 1.4 0.0 - 2.0 %    Neutrophils Absolute 2.57 1.20 - 7.70 x10*3/uL    Immature Granulocytes Absolute, Automated 0.02 0.00 - 0.70 x10*3/uL    Lymphocytes Absolute 1.15 (L) 1.20 - 4.80 x10*3/uL    Monocytes Absolute 0.56 0.10 - 1.00 x10*3/uL    Eosinophils Absolute 0.48 0.00 - 0.70 x10*3/uL    Basophils Absolute 0.07 0.00 - 0.10 x10*3/uL   Renal Function Panel   Result Value Ref Range    Glucose 91 74 - 99 mg/dL    Sodium 137 136 - 145 mmol/L    Potassium 4.6 3.5 - 5.3 mmol/L    Chloride 95 (L) 98 - 107 mmol/L    Bicarbonate 30 21 - 32 mmol/L    Anion Gap 17 10 - 20 mmol/L    Urea Nitrogen 29 (H) 6 - 23 mg/dL    Creatinine 6.87 (H) 0.50 - 1.05 mg/dL    eGFR 7 (L) >60 mL/min/1.73m*2    Calcium 9.7 8.6 - 10.6 mg/dL    Phosphorus 7.0 (H) 2.5 - 4.9 mg/dL    Albumin 3.9 3.4 - 5.0 g/dL   Magnesium   Result Value Ref Range    Magnesium 2.38 1.60 - 2.40 mg/dL     *Note: Due to a large number of results and/or encounters for the requested time period, some results have not been displayed. A complete set of results can be found in Results Review.        Assessment/Plan   Stacey Heath is a 54 y.o. 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 Dermatology was consulted for pruritic nodules on bilateral forearms.    Differential diagnoses: pruritus 2/2 uremia vs perforating disorder vs less likely prurigo nodularis vs scabies & xerosis    Impression:  Based on our evaluation today, it is difficult to discern the exact primary cause of the patient's pruritus as many of the lesions are flattened/crusted and show evidence of excoriation.  At this time we are not concerned that these lesions represent calciphylaxis as we  would expect that process to be exquisitely tender and more commonly located in dependent areas with violaceous color changes and necrosis.  As many of the patient's lesions have been scratched today, we do not believe skin biopsy would offer much value to discern between the most likely diagnoses and therefore feel it is appropriate to empirically treat.     Recommendations:  - Start pramoxine 1% lotion to affected areas prn for pruritus  - Please provide the patient with Minirin cream or petrolatum ointment to be applied to bilateral upper and lower extremities twice daily as moisturizer  -If patient does not improve with pramoxine lotion, could consider addition of triamcinolone 0.1% cream twice daily x 14 days  - If the patient needs follow-up outpatient for this, would consider narrowband UVB, gabapentin or SSRIs      The patient was seen and discussed with attending physician Dr. Fuentes. The assessment and plan was communicated to the care team.    Thank you for the consultation and for the opportunity to contribute to the care of this patient.      Brianna Weber MD   PGY3, Dermatology  Epic chat (preferred)  Team pager [phone]     I was present during all key portions of visit including history, exam, discussion/plan and/or procedures and directly supervised our resident during all portions of the visit    Herman Fuentes MD

## 2024-12-31 NOTE — PROGRESS NOTES
"Floor Readiness Note       I, personally, evaluated Stacey Heath prior to transfer to the floor, including reviewing all current laboratory and imaging studies. The patient remains appropriate for transfer to the floor. Bedside nurse is also in agreement of patient's readiness for the floor.     Brief summary:  Stacey Heath is a 54 y.o. female who was admitted to the MICU on 12/29/24 for hypertensive emergency c/b flash pulmonary edema. She has been treated with cardene gtt and received UF and HD with nephrology. She was then transitioned off cardene gtt and initially stable to for floor but the was noted to severe HA noted to be \"worst headache of my life\" with associated lethargy and photophobia and worsening severe HTN necessitate resumption of cardene gtt. On morning of 12/31/24, cardene gtt was weaned off and patient resume home anti-HTN regimen and noted to maintain goal SBP<180.     Updated focused Physical Exam:  Constitutional: in NAD  HEENT: sclerae anicteric, EOM grossly intact  CV: RRR, +systolic murmur  Pulm: CTAB, no increased WOB  GI: abd soft, NT, ND  Skin: warm and dry, + rash present on bilat UE and LE  Neuro: alert and conversant  Psych: affect appropriate    Current Vital Signs:  Heart Rate: 80 (12/31/24 1600 : User, System Default)  BP: 86/64 (12/31/24 1600 : User, System Default)  Temp: 37 °C (98.6 °F) (12/31/24 1200 : Echo Moreira)  Resp: 13 (12/31/24 1600 : User, System Default)  SpO2: 100 % (12/31/24 1600 : User, System Default)    Relevant updates since rounds:  -Resumed home valsartan 320mg daily  -Derm consulted and per their recs for pruritus, ordered petrolatum ointment to be applied liberally to b/l Ue's & LE's BID, and pramoxine 1% applied PRN to affected areas. Per their recs, if patient does not improve with pramoxine lotion, could consider addition of triamcinolone 0.1% cream twice daily x 14 days       Accepting team, Hospitalist RODGER, received verbal sign out and the " Provider Care team/Attending has been updated. Bedside nurse will now call accepting nurse for report and patient will be transferred to [place] [id number].

## 2025-01-01 LAB
ALBUMIN SERPL BCP-MCNC: 4.5 G/DL (ref 3.4–5)
ANION GAP SERPL CALC-SCNC: 23 MMOL/L (ref 10–20)
ATRIAL RATE: 107 BPM
ATRIAL RATE: 80 BPM
BASOPHILS # BLD AUTO: 0.04 X10*3/UL (ref 0–0.1)
BASOPHILS NFR BLD AUTO: 0.6 %
BUN SERPL-MCNC: 64 MG/DL (ref 6–23)
CALCIUM SERPL-MCNC: 10.3 MG/DL (ref 8.6–10.6)
CHLORIDE SERPL-SCNC: 93 MMOL/L (ref 98–107)
CO2 SERPL-SCNC: 24 MMOL/L (ref 21–32)
CREAT SERPL-MCNC: 10.19 MG/DL (ref 0.5–1.05)
EGFRCR SERPLBLD CKD-EPI 2021: 4 ML/MIN/1.73M*2
EOSINOPHIL # BLD AUTO: 0.78 X10*3/UL (ref 0–0.7)
EOSINOPHIL NFR BLD AUTO: 12 %
ERYTHROCYTE [DISTWIDTH] IN BLOOD BY AUTOMATED COUNT: 15.5 % (ref 11.5–14.5)
GLUCOSE SERPL-MCNC: 107 MG/DL (ref 74–99)
HCT VFR BLD AUTO: 41.1 % (ref 36–46)
HGB BLD-MCNC: 12.9 G/DL (ref 12–16)
IMM GRANULOCYTES # BLD AUTO: 0.02 X10*3/UL (ref 0–0.7)
IMM GRANULOCYTES NFR BLD AUTO: 0.3 % (ref 0–0.9)
LYMPHOCYTES # BLD AUTO: 1.11 X10*3/UL (ref 1.2–4.8)
LYMPHOCYTES NFR BLD AUTO: 17.1 %
MAGNESIUM SERPL-MCNC: 3.2 MG/DL (ref 1.6–2.4)
MCH RBC QN AUTO: 29.3 PG (ref 26–34)
MCHC RBC AUTO-ENTMCNC: 31.4 G/DL (ref 32–36)
MCV RBC AUTO: 93 FL (ref 80–100)
MONOCYTES # BLD AUTO: 0.47 X10*3/UL (ref 0.1–1)
MONOCYTES NFR BLD AUTO: 7.2 %
NEUTROPHILS # BLD AUTO: 4.09 X10*3/UL (ref 1.2–7.7)
NEUTROPHILS NFR BLD AUTO: 62.8 %
NRBC BLD-RTO: 0 /100 WBCS (ref 0–0)
P AXIS: 37 DEGREES
P AXIS: 50 DEGREES
P OFFSET: 189 MS
P OFFSET: 190 MS
P ONSET: 144 MS
P ONSET: 146 MS
PHOSPHATE SERPL-MCNC: 7.5 MG/DL (ref 2.5–4.9)
PLATELET # BLD AUTO: 186 X10*3/UL (ref 150–450)
POTASSIUM SERPL-SCNC: 5.8 MMOL/L (ref 3.5–5.3)
PR INTERVAL: 146 MS
PR INTERVAL: 152 MS
PTH-INTACT SERPL-MCNC: 746 PG/ML (ref 18.5–88)
Q ONSET: 219 MS
Q ONSET: 220 MS
QRS COUNT: 13 BEATS
QRS COUNT: 17 BEATS
QRS DURATION: 74 MS
QRS DURATION: 80 MS
QT INTERVAL: 360 MS
QT INTERVAL: 384 MS
QTC CALCULATION(BAZETT): 442 MS
QTC CALCULATION(BAZETT): 480 MS
QTC FREDERICIA: 423 MS
QTC FREDERICIA: 436 MS
R AXIS: 0 DEGREES
R AXIS: 18 DEGREES
RBC # BLD AUTO: 4.41 X10*6/UL (ref 4–5.2)
SODIUM SERPL-SCNC: 134 MMOL/L (ref 136–145)
T AXIS: 163 DEGREES
T AXIS: 187 DEGREES
T OFFSET: 400 MS
T OFFSET: 411 MS
VENTRICULAR RATE: 107 BPM
VENTRICULAR RATE: 80 BPM
WBC # BLD AUTO: 6.5 X10*3/UL (ref 4.4–11.3)

## 2025-01-01 PROCEDURE — 2500000004 HC RX 250 GENERAL PHARMACY W/ HCPCS (ALT 636 FOR OP/ED)

## 2025-01-01 PROCEDURE — 80069 RENAL FUNCTION PANEL: CPT

## 2025-01-01 PROCEDURE — 36415 COLL VENOUS BLD VENIPUNCTURE: CPT

## 2025-01-01 PROCEDURE — 2500000002 HC RX 250 W HCPCS SELF ADMINISTERED DRUGS (ALT 637 FOR MEDICARE OP, ALT 636 FOR OP/ED)

## 2025-01-01 PROCEDURE — 2500000001 HC RX 250 WO HCPCS SELF ADMINISTERED DRUGS (ALT 637 FOR MEDICARE OP)

## 2025-01-01 PROCEDURE — 2500000005 HC RX 250 GENERAL PHARMACY W/O HCPCS

## 2025-01-01 PROCEDURE — 2500000001 HC RX 250 WO HCPCS SELF ADMINISTERED DRUGS (ALT 637 FOR MEDICARE OP): Performed by: STUDENT IN AN ORGANIZED HEALTH CARE EDUCATION/TRAINING PROGRAM

## 2025-01-01 PROCEDURE — 83735 ASSAY OF MAGNESIUM: CPT

## 2025-01-01 PROCEDURE — 99232 SBSQ HOSP IP/OBS MODERATE 35: CPT

## 2025-01-01 PROCEDURE — 85025 COMPLETE CBC W/AUTO DIFF WBC: CPT

## 2025-01-01 PROCEDURE — 83970 ASSAY OF PARATHORMONE: CPT

## 2025-01-01 PROCEDURE — 1100000001 HC PRIVATE ROOM DAILY

## 2025-01-01 RX ORDER — VALSARTAN 320 MG/1
320 TABLET ORAL ONCE
Status: DISCONTINUED | OUTPATIENT
Start: 2025-01-01 | End: 2025-01-01

## 2025-01-01 RX ORDER — HYDRALAZINE HYDROCHLORIDE 20 MG/ML
10 INJECTION INTRAMUSCULAR; INTRAVENOUS EVERY 4 HOURS PRN
Status: DISCONTINUED | OUTPATIENT
Start: 2025-01-01 | End: 2025-01-01

## 2025-01-01 RX ORDER — CLONIDINE HYDROCHLORIDE 0.1 MG/1
0.2 TABLET ORAL 2 TIMES DAILY
Status: DISCONTINUED | OUTPATIENT
Start: 2025-01-01 | End: 2025-01-03

## 2025-01-01 RX ORDER — DIPHENHYDRAMINE HCL 25 MG
50 CAPSULE ORAL EVERY 6 HOURS PRN
Status: DISPENSED | OUTPATIENT
Start: 2025-01-01

## 2025-01-01 RX ORDER — HYDRALAZINE HYDROCHLORIDE 25 MG/1
25 TABLET, FILM COATED ORAL EVERY 6 HOURS PRN
Status: DISCONTINUED | OUTPATIENT
Start: 2025-01-01 | End: 2025-01-05

## 2025-01-01 RX ORDER — HYDRALAZINE HYDROCHLORIDE 25 MG/1
25 TABLET, FILM COATED ORAL EVERY 8 HOURS PRN
Status: DISCONTINUED | OUTPATIENT
Start: 2025-01-01 | End: 2025-01-01

## 2025-01-01 RX ORDER — AMMONIUM LACTATE 12 G/100G
LOTION TOPICAL 2 TIMES DAILY
Status: DISPENSED | OUTPATIENT
Start: 2025-01-01

## 2025-01-01 RX ADMIN — Medication 2 L/MIN: at 20:55

## 2025-01-01 RX ADMIN — APIXABAN 5 MG: 5 TABLET, FILM COATED ORAL at 20:55

## 2025-01-01 RX ADMIN — PETROLATUM: 420 OINTMENT TOPICAL at 20:57

## 2025-01-01 RX ADMIN — NIFEDIPINE 90 MG: 90 TABLET, FILM COATED, EXTENDED RELEASE ORAL at 20:55

## 2025-01-01 RX ADMIN — ASPIRIN 81 MG: 81 TABLET, COATED ORAL at 10:25

## 2025-01-01 RX ADMIN — SEVELAMER CARBONATE 800 MG: 800 TABLET, FILM COATED ORAL at 14:52

## 2025-01-01 RX ADMIN — Medication 2 L/MIN: at 08:00

## 2025-01-01 RX ADMIN — SEVELAMER CARBONATE 800 MG: 800 TABLET, FILM COATED ORAL at 18:18

## 2025-01-01 RX ADMIN — PETROLATUM: 420 OINTMENT TOPICAL at 14:52

## 2025-01-01 RX ADMIN — ATORVASTATIN CALCIUM 80 MG: 80 TABLET, FILM COATED ORAL at 20:53

## 2025-01-01 RX ADMIN — SUMATRIPTAN 25 MG: 25 TABLET, FILM COATED ORAL at 20:54

## 2025-01-01 RX ADMIN — GABAPENTIN 100 MG: 100 CAPSULE ORAL at 10:27

## 2025-01-01 RX ADMIN — APIXABAN 5 MG: 5 TABLET, FILM COATED ORAL at 10:26

## 2025-01-01 RX ADMIN — CARVEDILOL 25 MG: 25 TABLET, FILM COATED ORAL at 20:53

## 2025-01-01 RX ADMIN — CARVEDILOL 25 MG: 25 TABLET, FILM COATED ORAL at 10:25

## 2025-01-01 RX ADMIN — CLONIDINE HYDROCHLORIDE 0.2 MG: 0.1 TABLET ORAL at 10:26

## 2025-01-01 RX ADMIN — Medication 1 APPLICATION: at 21:07

## 2025-01-01 RX ADMIN — SUMATRIPTAN 25 MG: 25 TABLET, FILM COATED ORAL at 10:35

## 2025-01-01 RX ADMIN — CLONIDINE HYDROCHLORIDE 0.2 MG: 0.1 TABLET ORAL at 20:53

## 2025-01-01 RX ADMIN — ASCORBIC ACID, THIAMINE MONONITRATE,RIBOFLAVIN, NIACINAMIDE, PYRIDOXINE HYDROCHLORIDE, FOLIC ACID, CYANOCOBALAMIN, BIOTIN, CALCIUM PANTOTHENATE, 1 CAPSULE: 100; 1.5; 1.7; 20; 10; 1; 6000; 150000; 5 CAPSULE, LIQUID FILLED ORAL at 10:25

## 2025-01-01 RX ADMIN — SEVELAMER CARBONATE 800 MG: 800 TABLET, FILM COATED ORAL at 10:33

## 2025-01-01 RX ADMIN — TORSEMIDE 20 MG: 20 TABLET ORAL at 14:52

## 2025-01-01 RX ADMIN — DIPHENHYDRAMINE HYDROCHLORIDE 50 MG: 25 CAPSULE ORAL at 10:27

## 2025-01-01 RX ADMIN — Medication: at 10:54

## 2025-01-01 RX ADMIN — DIPHENHYDRAMINE HYDROCHLORIDE 50 MG: 25 CAPSULE ORAL at 19:30

## 2025-01-01 RX ADMIN — VALSARTAN 320 MG: 160 TABLET, FILM COATED ORAL at 20:53

## 2025-01-01 RX ADMIN — HYDRALAZINE HYDROCHLORIDE 10 MG: 20 INJECTION INTRAMUSCULAR; INTRAVENOUS at 05:12

## 2025-01-01 ASSESSMENT — PAIN SCALES - GENERAL
PAINLEVEL_OUTOF10: 0 - NO PAIN
PAINLEVEL_OUTOF10: 0 - NO PAIN
PAINLEVEL_OUTOF10: 6

## 2025-01-01 NOTE — NURSING NOTE
Received patient from MICU, alert and oriented patient not in distress. Resident on call was notified. See order for Hydralazine .

## 2025-01-01 NOTE — SIGNIFICANT EVENT
Rapid Response Nurse Note:  [x] RADAR alert/Score 6    Pager time: [phone]  Arrival time: 431  Event end time: 446  Location:  60  [] Phone triage     Rapid response initiated by:  [] Rapid Response RN [] Family [] Nursing Supervisor [] Physician   [x] RADAR auto-page [] Sepsis auto-page [] RN [] RT   [] NP/PA [] Other:     Primary reason for call:   [] BAT [] New CPAP/BiPAP [] Bleeding [] Change in mental status   [] Chest pain [] Code blue [] FiO2 >/= 50% [] HR </= 40 bpm   [] HR >/= 130 bpm [] Hyperglycemia [] Hypoglycemia [x] RADAR    [] RR </= 8 bpm [] RR >/= 30 bpm [] SBP </= 90 mmHg [] SpO2 < 90%   [] Seizure [] Sepsis [] Staff concern:     Initial VS and/or RADAR VS:  radar vitals below in bold    Vitals:    25 0000 25 0200 25 0430 25 0444   BP: 143/89 (!) 214/95 (!) 241/101 (!) 212/90   Pulse: 89 77  72   Resp: 20 16 17    Temp: 36.1 °C (97 °F) 36.5 °C (97.7 °F) 36 °C (96.8 °F)    TempSrc: Temporal Temporal     SpO2: 94% 97% 97%    Weight:       Height:             Interventions:  [] None [] ABG [] Assist w/ICU transfer [] BAT paged    [] Bag mask [] Blood [] Cardioversion [] Code Blue   [] Code blue for intubation [] Code status changed [] Chest x-ray [] EKG   [] IV fluid/bolus [] KUB x-ray [] Labs/cultures [x] Medication   [] Nebulizer treatment [] NIPPV (CPAP/BiPAP) [] Oxygen [] Oral airway   [x] Peripheral IV [] Palliative care consult [] CT/MRI [] Sepsis protocol    [] Suctioned [] Other:       Outcome:  [] Coded and  [] Code blue for intubation [] Coded and transferred to ICU []  on division   [x] Remained on division (no change) [] Remained on division + additional monitoring [] Remained in ED [] Transferred to ED   [] Transferred to ICU [] Transferred to inpatient status [] Transferred for interventions (procedure) [] Transferred to ICU stepdown    [] Transferred to surgery [] Transferred to telemetry [] Sepsis protocol [] STEMI protocol   [] Stroke protocol  [x] Bedside nurse instructed to page rapid response for any concerns or acute change in condition/VS     Additional Comments: Spoke to RN regarding vital signs.  Per RN, pt was recently admitted to floor from MICU.  Pt hypertensive.  IV access not working.  New peripheral iv placed by this RN.  BP rechecked and slightly improved.  Pt complaining of a headache.  RN to treat elevated bp with prn iv orders.  Goal sbp less than 180.

## 2025-01-01 NOTE — PROGRESS NOTES
"Stacey Heath is a 54 y.o. female on day 3 of admission presenting with Flash pulmonary edema.    Subjective   The patient was examined at bedside this morning. She was laying comfortably in bed and in no acute distress. She reported that he breathing is ok but she does not use oxygen at home. She also reported that he is a lot more itchy than usual and will use benadryl to help with her symptoms. She also stated that she has a headache which has not gone away.       Objective     Physical Exam  Constitutional:       General: She is not in acute distress.     Appearance: Normal appearance. She is not ill-appearing.   HENT:      Head: Normocephalic and atraumatic.   Eyes:      Extraocular Movements: Extraocular movements intact.      Conjunctiva/sclera: Conjunctivae normal.   Cardiovascular:      Rate and Rhythm: Normal rate and regular rhythm.      Heart sounds: Normal heart sounds. No murmur heard.     No friction rub. No gallop.   Pulmonary:      Effort: Pulmonary effort is normal. No respiratory distress.      Breath sounds: Normal breath sounds. No stridor. No wheezing, rhonchi or rales.      Comments: On 2L NC  Musculoskeletal:      Cervical back: Normal range of motion.      Right lower leg: No edema.      Left lower leg: No edema.   Skin:     General: Skin is warm and dry.      Comments: Dry skin on arms and excoriations seen   Neurological:      Mental Status: She is alert.   Psychiatric:         Mood and Affect: Mood normal.         Behavior: Behavior normal.         Last Recorded Vitals  Blood pressure 136/60, pulse 72, temperature 36.4 °C (97.5 °F), resp. rate 17, height 1.397 m (4' 7\"), weight 57.9 kg (127 lb 10.3 oz), SpO2 96%.  Intake/Output last 3 Shifts:  I/O last 3 completed shifts:  In: 1012.5 (17.5 mL/kg) [P.O.:540; I.V.:472.5 (8.2 mL/kg)]  Out: - (0 mL/kg)   Weight: 57.9 kg     Relevant Results                        Assessment/Plan   Stacey Heath is a 54 y.o. female who was admitted to the " "MICU on 12/29/24 for hypertensive emergency c/b flash pulmonary edema. She has been treated with cardene gtt and received UF and HD with nephrology. She was then transitioned off cardene gtt and initially stable to for floor but the was noted to severe HA noted to be \"worst headache of my life\" with associated lethargy and photophobia and worsening severe HTN necessitate resumption of cardene gtt. On morning of 12/31/24, cardene gtt was weaned off and patient resume home anti-HTN regimen and noted to maintain goal SBP<180. The patient was transferred to regular nursing floor.  Assessment & Plan  Flash pulmonary edema         #HTN Emergency c/b Flash Pulmonary Edema (improving)   #HFpEF  #Troponemia 2/2 T2 MI iso HTN, ESRD  #AHRF (resolving)  #Flash Pulmonary Edema (resolved)  :: Baseline home GDMT/BP medications: carvedilol 25 mg BID, clonidine 0.2 mg at bedtime, nifedipine ER 90 mg at bedtime, torsemide 20 mg on HD/BID on nHD days, valsartan 320 mg at bedtime  :: HS Trop peaked at 193, now downtrended   - s/p cardene drip in MICU  -SBP goal <180, not at goal  - Increased Clonidine 0.2 mg from at bedtime to BID  - Currently on 2L NC, wean as tolerated.     #ESRD on iHD Tu/Th/SAT  -Restarted home nephrocaps/phoslo   -Nephrology consulted, appreciate recommendations: underwent UF but experienced large drop in BP, can consider restarting home Epogen at their discretion  -Restarted home Torsemide 20mg after HD on dialysis days. Can consider resuming 20mg BID on non-HD days      #Hyperkalemia (resolved)   :: Found to be hyperkalemic to 5.7 with peaked T waves on EKG  on admission likely 2/2 missing HD   :: Received 1 dose Lokelma with f/u RFP showing K 5.2   -Daily RFPs       #Hx of Non-obstructive LUE Brachial V and Basilic V DVT (4/14/2024)  :: Per chart review noted to be provoked I/s/o PICC placement  :: Baseline on Eliquis 5mg BID  -Continue with Eliquis 5 mg BID, potentially can be discontinued as >6 months therapy " has been completed iso provoked DVT     #HLD  -Continue home statin: atorvastatin 80 mg QHS    #COPD  -Continue home inhalers: albuterol PRN, Breo Ellipta daily     #Anemia of Chronic Disease  -C/w Epogen TID w/ HD    #Peripheral Neuropathy  -Restarted home gabapentin 100mg daily    #GERD  -Continue home Pantoprazole 40 mg PRN    The patient was seen and discussed with Dr. Sen.    Robby Chen MD  Family Medicine  PGY-3

## 2025-01-01 NOTE — CARE PLAN
The patient's goals for the shift include      The clinical goals for the shift include Patient's BP will be at least below 180 systolic.      Problem: Safety - Adult  Goal: Free from fall injury  Outcome: Progressing

## 2025-01-02 ENCOUNTER — APPOINTMENT (OUTPATIENT)
Dept: DIALYSIS | Facility: HOSPITAL | Age: 55
End: 2025-01-02
Payer: COMMERCIAL

## 2025-01-02 ENCOUNTER — APPOINTMENT (OUTPATIENT)
Dept: VASCULAR MEDICINE | Facility: HOSPITAL | Age: 55
DRG: 252 | End: 2025-01-02
Payer: COMMERCIAL

## 2025-01-02 LAB
ALBUMIN SERPL BCP-MCNC: 3.8 G/DL (ref 3.4–5)
ANION GAP SERPL CALC-SCNC: 25 MMOL/L (ref 10–20)
BASOPHILS # BLD AUTO: 0.06 X10*3/UL (ref 0–0.1)
BASOPHILS NFR BLD AUTO: 0.9 %
BUN SERPL-MCNC: 88 MG/DL (ref 6–23)
CALCIUM SERPL-MCNC: 9.6 MG/DL (ref 8.6–10.6)
CHLORIDE SERPL-SCNC: 95 MMOL/L (ref 98–107)
CO2 SERPL-SCNC: 21 MMOL/L (ref 21–32)
CREAT SERPL-MCNC: 12.23 MG/DL (ref 0.5–1.05)
EGFRCR SERPLBLD CKD-EPI 2021: 3 ML/MIN/1.73M*2
EOSINOPHIL # BLD AUTO: 0.87 X10*3/UL (ref 0–0.7)
EOSINOPHIL NFR BLD AUTO: 13.5 %
ERYTHROCYTE [DISTWIDTH] IN BLOOD BY AUTOMATED COUNT: 15.2 % (ref 11.5–14.5)
GLUCOSE SERPL-MCNC: 70 MG/DL (ref 74–99)
HCT VFR BLD AUTO: 38.1 % (ref 36–46)
HGB BLD-MCNC: 11.8 G/DL (ref 12–16)
IMM GRANULOCYTES # BLD AUTO: 0.02 X10*3/UL (ref 0–0.7)
IMM GRANULOCYTES NFR BLD AUTO: 0.3 % (ref 0–0.9)
LYMPHOCYTES # BLD AUTO: 1.35 X10*3/UL (ref 1.2–4.8)
LYMPHOCYTES NFR BLD AUTO: 21 %
MAGNESIUM SERPL-MCNC: 3.31 MG/DL (ref 1.6–2.4)
MCH RBC QN AUTO: 29.1 PG (ref 26–34)
MCHC RBC AUTO-ENTMCNC: 31 G/DL (ref 32–36)
MCV RBC AUTO: 94 FL (ref 80–100)
MONOCYTES # BLD AUTO: 0.55 X10*3/UL (ref 0.1–1)
MONOCYTES NFR BLD AUTO: 8.6 %
NEUTROPHILS # BLD AUTO: 3.58 X10*3/UL (ref 1.2–7.7)
NEUTROPHILS NFR BLD AUTO: 55.7 %
NRBC BLD-RTO: 0 /100 WBCS (ref 0–0)
PHOSPHATE SERPL-MCNC: 9 MG/DL (ref 2.5–4.9)
PLATELET # BLD AUTO: 168 X10*3/UL (ref 150–450)
POTASSIUM SERPL-SCNC: 6.6 MMOL/L (ref 3.5–5.3)
RBC # BLD AUTO: 4.06 X10*6/UL (ref 4–5.2)
SODIUM SERPL-SCNC: 134 MMOL/L (ref 136–145)
WBC # BLD AUTO: 6.4 X10*3/UL (ref 4.4–11.3)

## 2025-01-02 PROCEDURE — 2500000002 HC RX 250 W HCPCS SELF ADMINISTERED DRUGS (ALT 637 FOR MEDICARE OP, ALT 636 FOR OP/ED)

## 2025-01-02 PROCEDURE — 8010000001 HC DIALYSIS - HEMODIALYSIS PER DAY

## 2025-01-02 PROCEDURE — 36415 COLL VENOUS BLD VENIPUNCTURE: CPT

## 2025-01-02 PROCEDURE — 2500000005 HC RX 250 GENERAL PHARMACY W/O HCPCS

## 2025-01-02 PROCEDURE — 85025 COMPLETE CBC W/AUTO DIFF WBC: CPT

## 2025-01-02 PROCEDURE — 80069 RENAL FUNCTION PANEL: CPT

## 2025-01-02 PROCEDURE — 1100000001 HC PRIVATE ROOM DAILY

## 2025-01-02 PROCEDURE — 84100 ASSAY OF PHOSPHORUS: CPT

## 2025-01-02 PROCEDURE — 2500000001 HC RX 250 WO HCPCS SELF ADMINISTERED DRUGS (ALT 637 FOR MEDICARE OP): Performed by: STUDENT IN AN ORGANIZED HEALTH CARE EDUCATION/TRAINING PROGRAM

## 2025-01-02 PROCEDURE — 2500000001 HC RX 250 WO HCPCS SELF ADMINISTERED DRUGS (ALT 637 FOR MEDICARE OP)

## 2025-01-02 PROCEDURE — 2500000004 HC RX 250 GENERAL PHARMACY W/ HCPCS (ALT 636 FOR OP/ED)

## 2025-01-02 PROCEDURE — 90935 HEMODIALYSIS ONE EVALUATION: CPT | Performed by: INTERNAL MEDICINE

## 2025-01-02 PROCEDURE — 99232 SBSQ HOSP IP/OBS MODERATE 35: CPT

## 2025-01-02 PROCEDURE — 83735 ASSAY OF MAGNESIUM: CPT

## 2025-01-02 RX ORDER — METOCLOPRAMIDE HYDROCHLORIDE 5 MG/ML
5 INJECTION INTRAMUSCULAR; INTRAVENOUS EVERY 6 HOURS PRN
Status: DISCONTINUED | OUTPATIENT
Start: 2025-01-02 | End: 2025-01-03

## 2025-01-02 RX ORDER — DEXTROSE 50 % IN WATER (D50W) INTRAVENOUS SYRINGE
25 ONCE
Status: DISCONTINUED | OUTPATIENT
Start: 2025-01-02 | End: 2025-01-02

## 2025-01-02 RX ORDER — METOCLOPRAMIDE 5 MG/1
5 TABLET ORAL EVERY 6 HOURS PRN
Status: DISCONTINUED | OUTPATIENT
Start: 2025-01-02 | End: 2025-01-03

## 2025-01-02 RX ORDER — DEXTROSE MONOHYDRATE 100 MG/ML
50 INJECTION, SOLUTION INTRAVENOUS CONTINUOUS
Status: DISCONTINUED | OUTPATIENT
Start: 2025-01-02 | End: 2025-01-02

## 2025-01-02 RX ADMIN — Medication: at 08:20

## 2025-01-02 RX ADMIN — GABAPENTIN 100 MG: 100 CAPSULE ORAL at 08:20

## 2025-01-02 RX ADMIN — VALSARTAN 320 MG: 160 TABLET, FILM COATED ORAL at 20:31

## 2025-01-02 RX ADMIN — HYDRALAZINE HYDROCHLORIDE 25 MG: 25 TABLET ORAL at 20:16

## 2025-01-02 RX ADMIN — PETROLATUM: 420 OINTMENT TOPICAL at 20:32

## 2025-01-02 RX ADMIN — Medication: at 20:29

## 2025-01-02 RX ADMIN — ASPIRIN 81 MG: 81 TABLET, COATED ORAL at 08:20

## 2025-01-02 RX ADMIN — Medication 4 L/MIN: at 08:00

## 2025-01-02 RX ADMIN — DIPHENHYDRAMINE HYDROCHLORIDE 50 MG: 25 CAPSULE ORAL at 22:22

## 2025-01-02 RX ADMIN — CLONIDINE HYDROCHLORIDE 0.2 MG: 0.1 TABLET ORAL at 20:16

## 2025-01-02 RX ADMIN — ACETAMINOPHEN 975 MG: 325 TABLET, FILM COATED ORAL at 01:07

## 2025-01-02 RX ADMIN — ACETAMINOPHEN 975 MG: 325 TABLET, FILM COATED ORAL at 14:48

## 2025-01-02 RX ADMIN — ACETAMINOPHEN 975 MG: 325 TABLET, FILM COATED ORAL at 22:22

## 2025-01-02 RX ADMIN — ASCORBIC ACID, THIAMINE MONONITRATE,RIBOFLAVIN, NIACINAMIDE, PYRIDOXINE HYDROCHLORIDE, FOLIC ACID, CYANOCOBALAMIN, BIOTIN, CALCIUM PANTOTHENATE, 1 CAPSULE: 100; 1.5; 1.7; 20; 10; 1; 6000; 150000; 5 CAPSULE, LIQUID FILLED ORAL at 08:20

## 2025-01-02 RX ADMIN — CLONIDINE HYDROCHLORIDE 0.2 MG: 0.1 TABLET ORAL at 08:19

## 2025-01-02 RX ADMIN — APIXABAN 5 MG: 5 TABLET, FILM COATED ORAL at 20:16

## 2025-01-02 RX ADMIN — TORSEMIDE 20 MG: 20 TABLET ORAL at 08:20

## 2025-01-02 RX ADMIN — APIXABAN 5 MG: 5 TABLET, FILM COATED ORAL at 08:20

## 2025-01-02 RX ADMIN — DIPHENHYDRAMINE HYDROCHLORIDE 50 MG: 25 CAPSULE ORAL at 14:48

## 2025-01-02 RX ADMIN — SEVELAMER CARBONATE 800 MG: 800 TABLET, FILM COATED ORAL at 16:50

## 2025-01-02 RX ADMIN — CARVEDILOL 25 MG: 25 TABLET, FILM COATED ORAL at 08:19

## 2025-01-02 RX ADMIN — CARVEDILOL 25 MG: 25 TABLET, FILM COATED ORAL at 20:16

## 2025-01-02 RX ADMIN — SEVELAMER CARBONATE 800 MG: 800 TABLET, FILM COATED ORAL at 08:20

## 2025-01-02 RX ADMIN — SUMATRIPTAN 25 MG: 25 TABLET, FILM COATED ORAL at 18:23

## 2025-01-02 RX ADMIN — Medication 21 L/MIN: at 20:33

## 2025-01-02 RX ADMIN — ATORVASTATIN CALCIUM 80 MG: 80 TABLET, FILM COATED ORAL at 20:16

## 2025-01-02 RX ADMIN — TORSEMIDE 20 MG: 20 TABLET ORAL at 20:16

## 2025-01-02 RX ADMIN — NIFEDIPINE 90 MG: 90 TABLET, FILM COATED, EXTENDED RELEASE ORAL at 20:30

## 2025-01-02 ASSESSMENT — COGNITIVE AND FUNCTIONAL STATUS - GENERAL
DAILY ACTIVITIY SCORE: 24
MOBILITY SCORE: 24

## 2025-01-02 ASSESSMENT — PAIN SCALES - GENERAL
PAINLEVEL_OUTOF10: 0 - NO PAIN
PAINLEVEL_OUTOF10: 0 - NO PAIN
PAINLEVEL_OUTOF10: 3
PAINLEVEL_OUTOF10: 1
PAINLEVEL_OUTOF10: 6
PAINLEVEL_OUTOF10: 0 - NO PAIN
PAINLEVEL_OUTOF10: 7

## 2025-01-02 ASSESSMENT — PAIN SCALES - WONG BAKER: WONGBAKER_NUMERICALRESPONSE: NO HURT

## 2025-01-02 ASSESSMENT — PAIN DESCRIPTION - LOCATION
LOCATION: HEAD
LOCATION: HEAD

## 2025-01-02 ASSESSMENT — ACTIVITIES OF DAILY LIVING (ADL): LACK_OF_TRANSPORTATION: NO

## 2025-01-02 NOTE — PROGRESS NOTES
01/02/25 1442   Discharge Planning   Living Arrangements Spouse/significant other   Support Systems Spouse/significant other;Family members   Assistance Needed independent with ADL's   Type of Residence Private residence   Home or Post Acute Services In home services  ([facility] in past)   Expected Discharge Disposition Home Health   Does the patient need discharge transport arranged? Yes   RoundTrip coordination needed? Yes   Financial Resource Strain   How hard is it for you to pay for the very basics like food, housing, medical care, and heating? Not very   Housing Stability   In the last 12 months, was there a time when you were not able to pay the mortgage or rent on time? N   In the past 12 months, how many times have you moved where you were living? 0   At any time in the past 12 months, were you homeless or living in a shelter (including now)? N   Transportation Needs   In the past 12 months, has lack of transportation kept you from medical appointments or from getting medications? no   In the past 12 months, has lack of transportation kept you from meetings, work, or from getting things needed for daily living? No       Plan per Medical/Surgical team: Patient is admitted for pulmonary edema. Nephrology is consulted for history of ESRD.    Assessment Note: Patient lives with significant other and nephew. She was using [facility], will see if she still has discharge needs. Patient nephew provides transportation to dialysis appointment. Patient will possibly need transportation home.    Home Care: [facility] in past  PCP: Dr. Sherri Gastelum  Pharmacy: [facility]  DME:  Falls: denies  Dialysis: [facility]    Potential Barriers: none  Discharge Disposition: home vs HHC  ADOD: 2-3 days

## 2025-01-02 NOTE — PROGRESS NOTES
"Hemodialysis Note    Patient seen and examined while on dialysis, recent events, labs, medications reviewed.   Claims dialysis \"hurts\", with difficult time to get UF off, profile 2 added by her nephrologist Dr Barraza    BP (!) 193/78   Pulse 78   Temp 35.9 °C (96.6 °F)   Resp 17   Ht 1.397 m (4' 7\")   Wt 55.6 kg (122 lb 9.2 oz)   SpO2 95%   BMI 28.49 kg/m²      Scheduled medications  ammonium lactate, , Topical, BID  apixaban, 5 mg, oral, BID  aspirin, 81 mg, oral, Daily  atorvastatin, 80 mg, oral, Nightly  carvedilol, 25 mg, oral, BID  cloNIDine, 0.2 mg, oral, BID  insulin regular, 10 Units, intravenous, Once   Followed by  dextrose, 25 g, intravenous, Once  fluticasone, 2 spray, Each Nostril, Daily  fluticasone furoate-vilanteroL, 1 puff, inhalation, Daily  gabapentin, 100 mg, oral, Daily  lidocaine, 1 patch, transdermal, Daily  NIFEdipine ER, 90 mg, oral, Nightly  oxygen, , inhalation, Continuous - Inhalation  polyethylene glycol, 17 g, oral, Daily  sevelamer carbonate, 800 mg, oral, TID  torsemide, 20 mg, oral, Every Mon/Wed/Fri  torsemide, 20 mg, oral, 2 times per day on Sunday Tuesday Thursday Saturday  valsartan, 320 mg, oral, q PM  vitamin B complex-vitamin C-folic acid, 1 capsule, oral, Daily  white petrolatum, , Topical, BID    Needs better out-patient BP control, on several meds, non complaint?. Lowering with dialysis   Using RUE AVF  No edema  Hyperkalemia, 2K bath  Hb 11.8    Routine HD today, next Saturday keeping TTS schedule       Will continue to follow, overall management per primary team, and continue regular dialysis.      Dilip Calderon MD  Division of Nephrology and Hypertension    "

## 2025-01-02 NOTE — PROGRESS NOTES
"Stacey Heath is a 54 y.o. female on day 4 of admission presenting with Flash pulmonary edema.    Subjective   The patient was examined at [facility] this morning. No acute events overnight. She was sitting up in bed and eating breakfast in no acute distress. She stated that she feels a little bit better today but still has a headache and feels a bit nauseous. She stated she feels nervous about dialysis today.       Objective     Physical Exam  Constitutional:       General: She is not in acute distress.     Appearance: Normal appearance. She is not ill-appearing.   HENT:      Head: Normocephalic and atraumatic.   Eyes:      Extraocular Movements: Extraocular movements intact.      Conjunctiva/sclera: Conjunctivae normal.   Cardiovascular:      Rate and Rhythm: Normal rate and regular rhythm.      Heart sounds: Normal heart sounds. No murmur heard.     No friction rub. No gallop.   Pulmonary:      Effort: Pulmonary effort is normal. No respiratory distress.      Breath sounds: Normal breath sounds. No stridor. No wheezing, rhonchi or rales.      Comments: On 2L NC  Musculoskeletal:      Cervical back: Normal range of motion.      Right lower leg: No edema.      Left lower leg: No edema.   Skin:     General: Skin is warm and dry.   Neurological:      Mental Status: She is alert.   Psychiatric:         Mood and Affect: Mood normal.         Behavior: Behavior normal.         Last Recorded Vitals  Blood pressure (!) 193/78, pulse 78, temperature 35.9 °C (96.6 °F), resp. rate 17, height 1.397 m (4' 7\"), weight 55.6 kg (122 lb 9.2 oz), SpO2 95%.  Intake/Output last 3 Shifts:  No intake/output data recorded.    Relevant Results                      Assessment/Plan   Stacey Heath is a 54 y.o. female who was admitted to the MICU on 12/29/24 for hypertensive emergency c/b flash pulmonary edema. She has been treated with cardene gtt and received UF and HD with nephrology. She was then transitioned off cardene gtt and initially " "stable to for floor but the was noted to severe HA noted to be \"worst headache of my life\" with associated lethargy and photophobia and worsening severe HTN necessitate resumption of cardene gtt. On morning of 12/31/24, cardene gtt was weaned off and patient resume home anti-HTN regimen and noted to maintain goal SBP<180. The patient was transferred to regular nursing floor.   Assessment & Plan  Flash pulmonary edema    #HTN Emergency c/b Flash Pulmonary Edema (improving)   #HFpEF  #Troponemia 2/2 T2 MI iso HTN, ESRD  #AHRF (resolving)  #Flash Pulmonary Edema (resolved)  :: Baseline home GDMT/BP medications: carvedilol 25 mg BID, clonidine 0.2 mg at bedtime, nifedipine ER 90 mg at bedtime, torsemide 20 mg on HD/BID on nHD days, valsartan 320 mg at bedtime  :: HS Trop peaked at 193, now downtrended   - s/p cardene drip in MICU  - c/w home BP meds  - SBP goal <180, not at goal, increased Clonidine 0.2 mg from at bedtime to BID  - Currently on 2L NC, wean as tolerated.     #ESRD on iHD Tu/Th/SAT  -Restarted home nephrocaps/phoslo   -Nephrology consulted, appreciate recommendations: underwent UF but experienced large drop in BP, can consider restarting home Epogen at their discretion  -Restarted home Torsemide 20mg after HD on dialysis days. 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 Sen.      Robby Chen MD  Family Medicine  PGY-3

## 2025-01-02 NOTE — NURSING NOTE
Report from Sending RN:    Report From: Vera ( PONCHO)  Recent Surgery of Procedure: No  Baseline Level of Consciousness (LOC): a/o x 4  Oxygen Use: 2 liters  Type: nc  Diabetic: No  Last BP Med Given Day of Dialysis: yes, see mar  Last Pain Med Given: none  Lab Tests to be Obtained with Dialysis: No  Blood Transfusion to be Given During Dialysis: No  Available IV Access: Yes  Medications to be Administered During Dialysis: No  Continuous IV Infusion Running: No  Restraints on Currently or in the Last 24 Hours: No  Hand-Off Communication: No acute overnight or morning events; vss; Pt did take morning medications; Pt will not need labs; Pt is a full code. Cassidy Kenyon RN.  Dialysis Catheter Dressing: AVF right upper arm  Last Dressing Change: 01/01/2025

## 2025-01-02 NOTE — CARE PLAN
The patient's goals for the shift include      The clinical goals for the shift include pt will be injury free this shift      Problem: Safety - Adult  Goal: Free from fall injury  Outcome: Progressing     Problem: Pain - Adult  Goal: Verbalizes/displays adequate comfort level or baseline comfort level  Outcome: Progressing     Problem: Fall/Injury  Goal: Not fall by end of shift  Outcome: Progressing

## 2025-01-02 NOTE — NURSING NOTE
Report to Receiving RN:    Report To: PONCHO Weathers  Time Report Called: 1320  Hand-Off Communication: Pt completed 3.5 hrs, 1.5L fluid removed, tolerated tx, /62, HR 73, pt stable, A/O, no issues.   Complications During Treatment: No  Ultrafiltration Treatment: Yes  Medications Administered During Dialysis: No  Blood Products Administered During Dialysis: No  Labs Sent During Dialysis: No  Heparin Drip Rate Changes: No  Dialysis Catheter Dressing: N/A, AVF  Last Dressing Change: AVF    Last Updated: 1:22 PM by SAKINA MOELLER

## 2025-01-02 NOTE — CARE PLAN
The patient's goals for the shift include      The clinical goals for the shift include pt will be injury free this shift      Problem: Pain - Adult  Goal: Verbalizes/displays adequate comfort level or baseline comfort level  1/1/2025 1913 by Maryam Nuñez RN  Outcome: Progressing  1/1/2025 1911 by Maryam Nuñez RN  Outcome: Progressing     Problem: Safety - Adult  Goal: Free from fall injury  1/1/2025 1913 by Maryam Nuñez RN  Outcome: Progressing  1/1/2025 1911 by Maryam Nuñez RN  Outcome: Progressing     Problem: Fall/Injury  Goal: Not fall by end of shift  1/1/2025 1913 by Maryam Nuñez RN  Outcome: Progressing  1/1/2025 1911 by Maryam Nuñez RN  Outcome: Progressing

## 2025-01-02 NOTE — CARE PLAN
The patient's goals for the shift include      The clinical goals for the shift include Pt will remain free from fall/injury throughout this shift.  Problem: Safety - Adult  Goal: Free from fall injury  Outcome: Progressing     Problem: Diabetes  Goal: Achieve decreasing blood glucose levels by end of shift  Outcome: Progressing     Problem: Diabetes  Goal: Increase stability of blood glucose readings by end of shift  Outcome: Progressing     Problem: Nutrition  Goal: Oral intake greater than 50%  Outcome: Progressing     Problem: Nutrition  Goal: Consume prescribed supplement  Outcome: Progressing     Problem: Nutrition  Goal: Adequate PO fluid intake  Outcome: Progressing

## 2025-01-03 ENCOUNTER — APPOINTMENT (OUTPATIENT)
Dept: CARDIOLOGY | Facility: HOSPITAL | Age: 55
DRG: 252 | End: 2025-01-03
Payer: COMMERCIAL

## 2025-01-03 ENCOUNTER — APPOINTMENT (OUTPATIENT)
Dept: CARE COORDINATION | Facility: CLINIC | Age: 55
End: 2025-01-03
Payer: COMMERCIAL

## 2025-01-03 ENCOUNTER — APPOINTMENT (OUTPATIENT)
Dept: VASCULAR MEDICINE | Facility: HOSPITAL | Age: 55
DRG: 252 | End: 2025-01-03
Payer: COMMERCIAL

## 2025-01-03 ENCOUNTER — HOME CARE VISIT (OUTPATIENT)
Dept: HOME HEALTH SERVICES | Facility: HOME HEALTH | Age: 55
End: 2025-01-03
Payer: COMMERCIAL

## 2025-01-03 LAB
ALBUMIN SERPL BCP-MCNC: 3.9 G/DL (ref 3.4–5)
ANION GAP SERPL CALC-SCNC: 20 MMOL/L (ref 10–20)
AORTIC VALVE PEAK VELOCITY: 1.78 M/S
AV PEAK GRADIENT: 13 MMHG
AVA (PEAK VEL): 1.87 CM2
BASOPHILS # BLD AUTO: 0.04 X10*3/UL (ref 0–0.1)
BASOPHILS NFR BLD AUTO: 0.8 %
BODY SURFACE AREA: 1.47 M2
BUN SERPL-MCNC: 58 MG/DL (ref 6–23)
CALCIUM SERPL-MCNC: 9.9 MG/DL (ref 8.6–10.6)
CHLORIDE SERPL-SCNC: 91 MMOL/L (ref 98–107)
CO2 SERPL-SCNC: 27 MMOL/L (ref 21–32)
CREAT SERPL-MCNC: 7.82 MG/DL (ref 0.5–1.05)
EGFRCR SERPLBLD CKD-EPI 2021: 6 ML/MIN/1.73M*2
EJECTION FRACTION APICAL 4 CHAMBER: 45
EJECTION FRACTION: 53 %
EOSINOPHIL # BLD AUTO: 0.6 X10*3/UL (ref 0–0.7)
EOSINOPHIL NFR BLD AUTO: 12.7 %
ERYTHROCYTE [DISTWIDTH] IN BLOOD BY AUTOMATED COUNT: 15.7 % (ref 11.5–14.5)
GLOBAL LONGITUDINAL STRAIN: 10.8 %
GLUCOSE SERPL-MCNC: 141 MG/DL (ref 74–99)
HCT VFR BLD AUTO: 37.7 % (ref 36–46)
HGB BLD-MCNC: 11.9 G/DL (ref 12–16)
IMM GRANULOCYTES # BLD AUTO: 0.02 X10*3/UL (ref 0–0.7)
IMM GRANULOCYTES NFR BLD AUTO: 0.4 % (ref 0–0.9)
LEFT ATRIUM VOLUME AREA LENGTH INDEX BSA: 54.2 ML/M2
LEFT VENTRICLE INTERNAL DIMENSION DIASTOLE: 3.93 CM (ref 3.5–6)
LEFT VENTRICULAR OUTFLOW TRACT DIAMETER: 1.68 CM
LYMPHOCYTES # BLD AUTO: 0.94 X10*3/UL (ref 1.2–4.8)
LYMPHOCYTES NFR BLD AUTO: 19.9 %
MAGNESIUM SERPL-MCNC: 2.83 MG/DL (ref 1.6–2.4)
MCH RBC QN AUTO: 29.1 PG (ref 26–34)
MCHC RBC AUTO-ENTMCNC: 31.6 G/DL (ref 32–36)
MCV RBC AUTO: 92 FL (ref 80–100)
MITRAL VALVE E/A RATIO: 0.8
MONOCYTES # BLD AUTO: 0.48 X10*3/UL (ref 0.1–1)
MONOCYTES NFR BLD AUTO: 10.1 %
NEUTROPHILS # BLD AUTO: 2.65 X10*3/UL (ref 1.2–7.7)
NEUTROPHILS NFR BLD AUTO: 56.1 %
NRBC BLD-RTO: 0 /100 WBCS (ref 0–0)
PHOSPHATE SERPL-MCNC: 6.4 MG/DL (ref 2.5–4.9)
PLATELET # BLD AUTO: 192 X10*3/UL (ref 150–450)
POTASSIUM SERPL-SCNC: 5.1 MMOL/L (ref 3.5–5.3)
RBC # BLD AUTO: 4.09 X10*6/UL (ref 4–5.2)
RIGHT VENTRICLE FREE WALL PEAK S': 10 CM/S
RIGHT VENTRICLE PEAK SYSTOLIC PRESSURE: 32.7 MMHG
SODIUM SERPL-SCNC: 133 MMOL/L (ref 136–145)
TRICUSPID ANNULAR PLANE SYSTOLIC EXCURSION: 1.7 CM
WBC # BLD AUTO: 4.7 X10*3/UL (ref 4.4–11.3)

## 2025-01-03 PROCEDURE — 83735 ASSAY OF MAGNESIUM: CPT

## 2025-01-03 PROCEDURE — 84100 ASSAY OF PHOSPHORUS: CPT

## 2025-01-03 PROCEDURE — 99233 SBSQ HOSP IP/OBS HIGH 50: CPT | Performed by: NURSE PRACTITIONER

## 2025-01-03 PROCEDURE — 1100000001 HC PRIVATE ROOM DAILY

## 2025-01-03 PROCEDURE — 2500000001 HC RX 250 WO HCPCS SELF ADMINISTERED DRUGS (ALT 637 FOR MEDICARE OP): Performed by: STUDENT IN AN ORGANIZED HEALTH CARE EDUCATION/TRAINING PROGRAM

## 2025-01-03 PROCEDURE — 93971 EXTREMITY STUDY: CPT | Performed by: INTERNAL MEDICINE

## 2025-01-03 PROCEDURE — 93971 EXTREMITY STUDY: CPT

## 2025-01-03 PROCEDURE — 99233 SBSQ HOSP IP/OBS HIGH 50: CPT | Performed by: INTERNAL MEDICINE

## 2025-01-03 PROCEDURE — 0932T N-INVS DET HRT FAIL AUG ECHO: CPT

## 2025-01-03 PROCEDURE — 99233 SBSQ HOSP IP/OBS HIGH 50: CPT

## 2025-01-03 PROCEDURE — 2500000001 HC RX 250 WO HCPCS SELF ADMINISTERED DRUGS (ALT 637 FOR MEDICARE OP)

## 2025-01-03 PROCEDURE — 2500000005 HC RX 250 GENERAL PHARMACY W/O HCPCS

## 2025-01-03 PROCEDURE — 85025 COMPLETE CBC W/AUTO DIFF WBC: CPT

## 2025-01-03 PROCEDURE — 2500000004 HC RX 250 GENERAL PHARMACY W/ HCPCS (ALT 636 FOR OP/ED)

## 2025-01-03 PROCEDURE — 36415 COLL VENOUS BLD VENIPUNCTURE: CPT

## 2025-01-03 PROCEDURE — 80069 RENAL FUNCTION PANEL: CPT

## 2025-01-03 RX ORDER — LIDOCAINE 40 MG/G
CREAM TOPICAL AS NEEDED
Status: DISPENSED | OUTPATIENT
Start: 2025-01-03

## 2025-01-03 RX ORDER — METOCLOPRAMIDE HYDROCHLORIDE 5 MG/ML
5 INJECTION INTRAMUSCULAR; INTRAVENOUS EVERY 6 HOURS
Status: DISCONTINUED | OUTPATIENT
Start: 2025-01-03 | End: 2025-01-03

## 2025-01-03 RX ORDER — ISOSORBIDE MONONITRATE 30 MG/1
30 TABLET, EXTENDED RELEASE ORAL DAILY
Status: DISCONTINUED | OUTPATIENT
Start: 2025-01-03 | End: 2025-01-04

## 2025-01-03 RX ORDER — CLONIDINE HYDROCHLORIDE 0.1 MG/1
0.3 TABLET ORAL 2 TIMES DAILY
Status: DISCONTINUED | OUTPATIENT
Start: 2025-01-03 | End: 2025-01-04

## 2025-01-03 RX ORDER — DOXAZOSIN 4 MG/1
4 TABLET ORAL DAILY
Status: DISCONTINUED | OUTPATIENT
Start: 2025-01-03 | End: 2025-01-04

## 2025-01-03 RX ORDER — CLONIDINE HYDROCHLORIDE 0.1 MG/1
0.2 TABLET ORAL ONCE
Status: COMPLETED | OUTPATIENT
Start: 2025-01-03 | End: 2025-01-03

## 2025-01-03 RX ORDER — GABAPENTIN 300 MG/1
300 CAPSULE ORAL DAILY
Status: DISCONTINUED | OUTPATIENT
Start: 2025-01-03 | End: 2025-01-04

## 2025-01-03 RX ORDER — METOCLOPRAMIDE 10 MG/1
5 TABLET ORAL EVERY 6 HOURS
Status: DISPENSED | OUTPATIENT
Start: 2025-01-03

## 2025-01-03 RX ORDER — ACETAMINOPHEN 325 MG/1
975 TABLET ORAL EVERY 8 HOURS
Status: DISPENSED | OUTPATIENT
Start: 2025-01-03

## 2025-01-03 RX ADMIN — SUMATRIPTAN 25 MG: 25 TABLET, FILM COATED ORAL at 11:24

## 2025-01-03 RX ADMIN — ISOSORBIDE MONONITRATE 30 MG: 30 TABLET, EXTENDED RELEASE ORAL at 15:17

## 2025-01-03 RX ADMIN — PETROLATUM: 420 OINTMENT TOPICAL at 11:57

## 2025-01-03 RX ADMIN — POLYETHYLENE GLYCOL 3350 17 G: 17 POWDER, FOR SOLUTION ORAL at 11:18

## 2025-01-03 RX ADMIN — Medication: at 21:34

## 2025-01-03 RX ADMIN — CLONIDINE HYDROCHLORIDE 0.3 MG: 0.1 TABLET ORAL at 21:30

## 2025-01-03 RX ADMIN — LIDOCAINE 4%: 4 CREAM TOPICAL at 23:47

## 2025-01-03 RX ADMIN — CARVEDILOL 25 MG: 25 TABLET, FILM COATED ORAL at 21:31

## 2025-01-03 RX ADMIN — ASPIRIN 81 MG: 81 TABLET, COATED ORAL at 11:20

## 2025-01-03 RX ADMIN — METOCLOPRAMIDE 5 MG: 10 TABLET ORAL at 23:47

## 2025-01-03 RX ADMIN — CLONIDINE HYDROCHLORIDE 0.2 MG: 0.1 TABLET ORAL at 13:02

## 2025-01-03 RX ADMIN — PETROLATUM: 420 OINTMENT TOPICAL at 21:32

## 2025-01-03 RX ADMIN — GABAPENTIN 300 MG: 300 CAPSULE ORAL at 11:45

## 2025-01-03 RX ADMIN — APIXABAN 5 MG: 5 TABLET, FILM COATED ORAL at 11:19

## 2025-01-03 RX ADMIN — CARVEDILOL 25 MG: 25 TABLET, FILM COATED ORAL at 11:19

## 2025-01-03 RX ADMIN — ATORVASTATIN CALCIUM 80 MG: 80 TABLET, FILM COATED ORAL at 21:30

## 2025-01-03 RX ADMIN — DOXAZOSIN 4 MG: 4 TABLET ORAL at 15:18

## 2025-01-03 RX ADMIN — LIDOCAINE 1 PATCH: 4 PATCH TOPICAL at 11:19

## 2025-01-03 RX ADMIN — Medication 2 L/MIN: at 21:32

## 2025-01-03 RX ADMIN — DIPHENHYDRAMINE HYDROCHLORIDE 50 MG: 25 CAPSULE ORAL at 21:31

## 2025-01-03 RX ADMIN — Medication 2 L/MIN: at 11:55

## 2025-01-03 RX ADMIN — SUMATRIPTAN 25 MG: 25 TABLET, FILM COATED ORAL at 03:13

## 2025-01-03 RX ADMIN — HYDRALAZINE HYDROCHLORIDE 25 MG: 25 TABLET ORAL at 02:17

## 2025-01-03 RX ADMIN — HYDRALAZINE HYDROCHLORIDE 25 MG: 25 TABLET ORAL at 12:05

## 2025-01-03 RX ADMIN — SEVELAMER CARBONATE 800 MG: 800 TABLET, FILM COATED ORAL at 17:35

## 2025-01-03 RX ADMIN — TORSEMIDE 20 MG: 20 TABLET ORAL at 17:34

## 2025-01-03 RX ADMIN — METOCLOPRAMIDE 5 MG: 10 TABLET ORAL at 17:41

## 2025-01-03 RX ADMIN — APIXABAN 5 MG: 5 TABLET, FILM COATED ORAL at 21:31

## 2025-01-03 RX ADMIN — SEVELAMER CARBONATE 800 MG: 800 TABLET, FILM COATED ORAL at 11:20

## 2025-01-03 RX ADMIN — ASCORBIC ACID, THIAMINE MONONITRATE,RIBOFLAVIN, NIACINAMIDE, PYRIDOXINE HYDROCHLORIDE, FOLIC ACID, CYANOCOBALAMIN, BIOTIN, CALCIUM PANTOTHENATE, 1 CAPSULE: 100; 1.5; 1.7; 20; 10; 1; 6000; 150000; 5 CAPSULE, LIQUID FILLED ORAL at 11:19

## 2025-01-03 RX ADMIN — METOCLOPRAMIDE 5 MG: 10 TABLET ORAL at 11:25

## 2025-01-03 RX ADMIN — DIPHENHYDRAMINE HYDROCHLORIDE 50 MG: 25 CAPSULE ORAL at 15:17

## 2025-01-03 RX ADMIN — ACETAMINOPHEN 975 MG: 325 TABLET ORAL at 17:35

## 2025-01-03 RX ADMIN — DICLOFENAC SODIUM 4 G: 10 GEL TOPICAL at 02:17

## 2025-01-03 RX ADMIN — Medication: at 11:58

## 2025-01-03 ASSESSMENT — PAIN DESCRIPTION - LOCATION: LOCATION: LEG

## 2025-01-03 ASSESSMENT — COGNITIVE AND FUNCTIONAL STATUS - GENERAL
DAILY ACTIVITIY SCORE: 24
MOBILITY SCORE: 24
DAILY ACTIVITIY SCORE: 24

## 2025-01-03 ASSESSMENT — PAIN SCALES - GENERAL
PAINLEVEL_OUTOF10: 6
PAINLEVEL_OUTOF10: 8
PAINLEVEL_OUTOF10: 7
PAINLEVEL_OUTOF10: 4

## 2025-01-03 ASSESSMENT — PAIN - FUNCTIONAL ASSESSMENT: PAIN_FUNCTIONAL_ASSESSMENT: 0-10

## 2025-01-03 ASSESSMENT — PAIN DESCRIPTION - ORIENTATION: ORIENTATION: RIGHT

## 2025-01-03 NOTE — PROGRESS NOTES
"Nutrition Follow-up   Dietitian 2 wk follow-up. Pt is being seen Virtual for  renal diet edu    Labs  Lab Results   Component Value Date    HGBA1C 4.7 11/09/2024    HGBA1C 5.2 06/29/2024    HGBA1C 3.7 03/29/2024     (L) 01/03/2025    K 5.1 01/03/2025    CL 91 (L) 01/03/2025    CO2 27 01/03/2025    BUN 58 (H) 01/03/2025    CREATININE 7.82 (H) 01/03/2025    CALCIUM 9.9 01/03/2025    ALBUMIN 3.9 01/03/2025    PROT 6.7 12/30/2024    BILITOT 0.5 12/30/2024    ALKPHOS 128 (H) 12/30/2024    ALT 15 12/30/2024    AST 16 12/30/2024    GLUCOSE 141 (H) 01/03/2025    BHYDRXBUT 0.08 12/16/2019    CPEPTIDE 10.0 (H) 03/29/2024       Lab Results   Component Value Date    CHOL 108 11/09/2024    LDLCALC 54 11/09/2024    TRIG 46 11/09/2024    HDL 45.1 11/09/2024    LDLF 99 12/12/2020          Comments:   NA remains low; BUN, Cr, K+ phos improved from yesterday   CMP:  stable   Lipid panel:  wnl     Anthropometrics  Ht Readings from Last 1 Encounters:   12/29/24 1.397 m (4' 7\")       BMI Readings from Last 1 Encounters:   01/02/25 28.49 kg/m²       Wt Readings from Last 10 Encounters:   01/02/25 55.6 kg (122 lb 9.2 oz)   12/20/24 54.9 kg (121 lb)   12/11/24 56.6 kg (124 lb 12.5 oz)   12/01/24 52.6 kg (116 lb)   11/26/24 52.6 kg (116 lb)   11/25/24 52.6 kg (116 lb)   11/24/24 52.2 kg (115 lb)   11/19/24 53.5 kg (117 lb 15.1 oz)   11/08/24 55.4 kg (122 lb 1.6 oz)   11/06/24 47.2 kg (104 lb)       Weight change:  1/2 wt from hosp, unsure if this was taken before or after HD    Visit Summary    Pt remains in hospital. Isn't feeling well, is c/o N/V. Was seen by inlida GILL in ICU Monday. Hospital diet was liberalized, cardiac restriction was removed but this hasn't make a sig impact to what she has been eating or her options. Gets ensure every day but doesn't like really like them and doesn't like vanilla which is the flavor she has been getting. Prefers strawberry or chocolate. Has been mostly eating renal pizza and grapes, as well as " eggs, [place] toast and oatmeal for bfast.     This RD updated pt on her labs and edu pt on why following a renal diet is important given the above improvements.     Nutrition Diagnosis:  Diagnosis Statement 1:  Diagnosis Status: Ongoing  Diagnosis : Altered nutrition related lab values  related to  progressing CKD  as evidenced by  elevated renal labs    Diagnosis Statement 2:  Diagnosis Status: Ongoing  Diagnosis : Altered GI function  related to  unknown, chronic illness  as evidenced by  pt poor PO intake and c/o N/V.     Nutrition Goals    Cont to follow renal diet while in hosp  2. PC program CHW will relay to nurse on unit pt's preference of chocolate ensure       Follow up Plan  Will follow-up x 2 wk

## 2025-01-03 NOTE — PROGRESS NOTES
"Stacey Heath is a 54 y.o. female on day 5 of admission presenting with Flash pulmonary edema.    Subjective   Pt resting in bed. Denies n/v/d, fever, cough, chills, pain, chest pains, light head, constipation , feels dizziness which she think is from blood pressure dropping too fast, c/o sob       Objective     Physical Exam  Vitals and nursing note reviewed.   Cardiovascular:      Rate and Rhythm: Normal rate.   Pulmonary:      Comments: Cont POX at 100% on 2L nc  Mckinley lung sounds dim  Abdominal:      General: There is distension.      Comments: Mild distension non-tender to palpation   Genitourinary:     Comments: States make urine  Musculoskeletal:      Comments: Ble without edema   Skin:     General: Skin is warm and dry.   Neurological:      Mental Status: She is alert and oriented to person, place, and time.   Psychiatric:         Mood and Affect: Mood normal.         Behavior: Behavior normal.         Last Recorded Vitals  Blood pressure 155/73, pulse 82, temperature 36.7 °C (98.1 °F), resp. rate 18, height 1.397 m (4' 7\"), weight 55.6 kg (122 lb 9.2 oz), SpO2 98%.  Intake/Output last 3 Shifts:  I/O last 3 completed shifts:  In: 1200 (21.6 mL/kg) [I.V.:800 (14.4 mL/kg); Other:400]  Out: 1845 (33.2 mL/kg) [Other:1845]  Weight: 55.6 kg     Relevant Results  Scheduled medications  acetaminophen, 975 mg, oral, q8h  ammonium lactate, , Topical, BID  apixaban, 5 mg, oral, BID  aspirin, 81 mg, oral, Daily  atorvastatin, 80 mg, oral, Nightly  carvedilol, 25 mg, oral, BID  cloNIDine, 0.3 mg, oral, BID  doxazosin, 4 mg, oral, Daily  fluticasone, 2 spray, Each Nostril, Daily  fluticasone furoate-vilanteroL, 1 puff, inhalation, Daily  gabapentin, 300 mg, oral, Daily  isosorbide mononitrate ER, 30 mg, oral, Daily  lidocaine, 1 patch, transdermal, Daily  metoclopramide, 5 mg, oral, q6h  NIFEdipine ER, 90 mg, oral, Nightly  oxygen, , inhalation, Continuous - Inhalation  perflutren lipid microspheres, 0.5-10 mL of dilution, " intravenous, Once in imaging  polyethylene glycol, 17 g, oral, Daily  sevelamer carbonate, 800 mg, oral, TID  torsemide, 20 mg, oral, Every Mon/Wed/Fri  torsemide, 20 mg, oral, 2 times per day on Sunday Tuesday Thursday Saturday  valsartan, 320 mg, oral, q PM  vitamin B complex-vitamin C-folic acid, 1 capsule, oral, Daily  white petrolatum, , Topical, BID      Continuous medications     PRN medications  PRN medications: albuterol, diclofenac sodium, diphenhydrAMINE, hydrALAZINE, ipratropium-albuteroL, ondansetron, pramoxine, sodium chloride, SUMAtriptan   Results for orders placed or performed during the hospital encounter of 12/29/24 (from the past 24 hours)   CBC and Auto Differential   Result Value Ref Range    WBC 4.7 4.4 - 11.3 x10*3/uL    nRBC 0.0 0.0 - 0.0 /100 WBCs    RBC 4.09 4.00 - 5.20 x10*6/uL    Hemoglobin 11.9 (L) 12.0 - 16.0 g/dL    Hematocrit 37.7 36.0 - 46.0 %    MCV 92 80 - 100 fL    MCH 29.1 26.0 - 34.0 pg    MCHC 31.6 (L) 32.0 - 36.0 g/dL    RDW 15.7 (H) 11.5 - 14.5 %    Platelets 192 150 - 450 x10*3/uL    Neutrophils % 56.1 40.0 - 80.0 %    Immature Granulocytes %, Automated 0.4 0.0 - 0.9 %    Lymphocytes % 19.9 13.0 - 44.0 %    Monocytes % 10.1 2.0 - 10.0 %    Eosinophils % 12.7 0.0 - 6.0 %    Basophils % 0.8 0.0 - 2.0 %    Neutrophils Absolute 2.65 1.20 - 7.70 x10*3/uL    Immature Granulocytes Absolute, Automated 0.02 0.00 - 0.70 x10*3/uL    Lymphocytes Absolute 0.94 (L) 1.20 - 4.80 x10*3/uL    Monocytes Absolute 0.48 0.10 - 1.00 x10*3/uL    Eosinophils Absolute 0.60 0.00 - 0.70 x10*3/uL    Basophils Absolute 0.04 0.00 - 0.10 x10*3/uL   Renal Function Panel   Result Value Ref Range    Glucose 141 (H) 74 - 99 mg/dL    Sodium 133 (L) 136 - 145 mmol/L    Potassium 5.1 3.5 - 5.3 mmol/L    Chloride 91 (L) 98 - 107 mmol/L    Bicarbonate 27 21 - 32 mmol/L    Anion Gap 20 10 - 20 mmol/L    Urea Nitrogen 58 (H) 6 - 23 mg/dL    Creatinine 7.82 (H) 0.50 - 1.05 mg/dL    eGFR 6 (L) >60 mL/min/1.73m*2     Calcium 9.9 8.6 - 10.6 mg/dL    Phosphorus 6.4 (H) 2.5 - 4.9 mg/dL    Albumin 3.9 3.4 - 5.0 g/dL   Magnesium   Result Value Ref Range    Magnesium 2.83 (H) 1.60 - 2.40 mg/dL   Transthoracic Echo (TTE) Limited   Result Value Ref Range    AV pk malu 1.78 m/s    LVOT diam 1.68 cm    MV E/A ratio 0.80     LA vol index A/L 54.2 ml/m2    Tricuspid annular plane systolic excursion 1.7 cm    LV EF 53 %    RV free wall pk S' 10.00 cm/s    LV GLS 10.8 %    LVIDd 3.93 cm    RVSP 32.7 mmHg    Aortic Valve Area by Continuity of Peak Velocity 1.87 cm2    AV pk grad 13 mmHg    LV A4C EF 45.0      *Note: Due to a large number of results and/or encounters for the requested time period, some results have not been displayed. A complete set of results can be found in Results Review.                             Assessment/Plan   Assessment & Plan  Flash pulmonary edema    ESRD (end stage renal disease) on dialysis (Multi)    Tolerated hemodialysis yesterday with net fluid loss 1.5L    Overall Hemodynamically unstable . Bp with tx (99//69),  euvolemic on exam and has stable electrolytes . K+=5.1     Outpatient Dialysis schedule:   TTS [facility]/Dr Barraza      Access: rt fist with +thrill/bruit- no issues - able to achieve      Anemia of ESRD: not on SOFÍA.. current hgb 11.9.. will cont to monitor       CKD-MBD Phosphate Binder:sevelamer carbonate (Renvela) tablet 800 mg tid ac, vitamin B complex-vitamin C-folic acid (Nephrocaps) capsule 1 capsule daily     Plan HD tomorrow with UF as tolerated     Renal diet      Please obtain daily standing wt (if possible)     Medication to be adjusted for ESRD      Patient to continue regular HD schedule while inpatient and to follow with the outpatient nephrologist at discharge          I spent 50 minutes in the professional and overall care of this patient.      Arleen Reed, APRN-CNP

## 2025-01-03 NOTE — PROGRESS NOTES
Nephrology Consult Progress Note    Admit Date: 12/29/2024    Interval history:  Claims she was very sick during dialysis and afterwards     CURRENT MEDICATIONS:    Current Facility-Administered Medications:     acetaminophen (Tylenol) tablet 975 mg, 975 mg, oral, q8h, Robby Chen MD    albuterol 2.5 mg /3 mL (0.083 %) nebulizer solution 2.5 mg, 2.5 mg, nebulization, q6h PRN, Fili Dixon MD    ammonium lactate (Lac-Hydrin) 12 % lotion, , Topical, BID, Firas R Sen, DO, Given at 01/03/25 1158    apixaban (Eliquis) tablet 5 mg, 5 mg, oral, BID, Fili Dixon MD, 5 mg at 01/03/25 1119    aspirin EC tablet 81 mg, 81 mg, oral, Daily, Fili Dixon MD, 81 mg at 01/03/25 1120    atorvastatin (Lipitor) tablet 80 mg, 80 mg, oral, Nightly, Fili Dixon MD, 80 mg at 01/02/25 2016    carvedilol (Coreg) tablet 25 mg, 25 mg, oral, BID, Fili Dixon MD, 25 mg at 01/03/25 1119    cloNIDine (Catapres) tablet 0.3 mg, 0.3 mg, oral, BID, Robby Chen MD    diclofenac sodium (Voltaren) 1 % gel 4 g, 4 g, Topical, 4x daily PRN, Fili Dixon MD, 4 g at 01/03/25 0217    diphenhydrAMINE (BENADryl) capsule 50 mg, 50 mg, oral, q6h PRN, Firas R Sen, DO, 50 mg at 01/03/25 1517    doxazosin (Cardura) tablet 4 mg, 4 mg, oral, Daily, Firas R Sen, DO, 4 mg at 01/03/25 1518    fluticasone (Flonase) nasal spray 2 spray, 2 spray, Each Nostril, Daily, Fili Dixon MD    fluticasone furoate-vilanteroL (Breo Ellipta) 100-25 mcg/dose inhaler 1 puff, 1 puff, inhalation, Daily, Fili Dixon MD, 1 puff at 12/31/24 0843    gabapentin (Neurontin) capsule 300 mg, 300 mg, oral, Daily, Robby Chen MD, 300 mg at 01/03/25 1145    hydrALAZINE (Apresoline) tablet 25 mg, 25 mg, oral, q6h PRN, Justin Sen DO, 25 mg at 01/03/25 1205    ipratropium-albuteroL (Duo-Neb) 0.5-2.5 mg/3 mL nebulizer solution 3 mL, 3 mL, nebulization, q6h PRN, Fili Dixon MD     isosorbide mononitrate ER (Imdur) 24 hr tablet 30 mg, 30 mg, oral, Daily, Justin Sen DO, 30 mg at 01/03/25 1517    lidocaine 4 % patch 1 patch, 1 patch, transdermal, Daily, Fili Dixon MD, 1 patch at 01/03/25 1119    metoclopramide (Reglan) tablet 5 mg, 5 mg, oral, q6h, 5 mg at 01/03/25 1125 **OR** [DISCONTINUED] metoclopramide (Reglan) injection 5 mg, 5 mg, intravenous, q6h, Robby Chen MD    NIFEdipine ER (Adalat CC) 24 hr tablet 90 mg, 90 mg, oral, Nightly, Fili Dixon MD, 90 mg at 01/02/25 2030    ondansetron (Zofran) injection 4 mg, 4 mg, intravenous, q8h PRN, Fili Dixon MD, 4 mg at 12/30/24 1817    oxygen (O2) therapy, , inhalation, Continuous - Inhalation, Fili Dixon MD, 2 L/min at 01/03/25 1155    perflutren lipid microspheres (Definity) injection 0.5-10 mL of dilution, 0.5-10 mL of dilution, intravenous, Once in imaging, Justin Sen DO    polyethylene glycol (Glycolax, Miralax) packet 17 g, 17 g, oral, Daily, Fili Dixon MD, 17 g at 01/03/25 1118    pramoxine (Sarna Sensitive) 1 % lotion, , Topical, PRN, Fili Dixon MD    sevelamer carbonate (Renvela) tablet 800 mg, 800 mg, oral, TID, Fili Dixon MD, 800 mg at 01/03/25 1120    sodium chloride (Ocean) 0.65 % nasal spray 2 spray, 2 spray, Each Nostril, 4x daily PRN, Justin Cortesim, DO    SUMAtriptan (Imitrex) tablet 25 mg, 25 mg, oral, q2h PRN, Fili Dixon MD, 25 mg at 01/03/25 1124    torsemide (Demadex) tablet 20 mg, 20 mg, oral, Every Mon/Wed/Fri, Fili Dixon MD, 20 mg at 01/01/25 1452    torsemide (Demadex) tablet 20 mg, 20 mg, oral, 2 times per day on Sunday Tuesday Thursday Saturday, Fili Dixon MD, 20 mg at 01/02/25 2016    valsartan (Diovan) tablet 320 mg, 320 mg, oral, q PM, Fili Dixon MD, 320 mg at 01/02/25 2031    vitamin B complex-vitamin C-folic acid (Nephrocaps) capsule 1 capsule, 1 capsule, oral,  "Daily, Fili Dixon MD, 1 capsule at 01/03/25 1119    white petrolatum (Aquaphor) ointment, , Topical, BID, Fili Dixon MD, Given at 01/03/25 1157     No intake or output data in the 24 hours ending 01/03/25 1523    PHYSICAL EXAM:  BP (!) 204/94   Pulse 82   Temp 36.7 °C (98.1 °F)   Resp 18   Ht 1.397 m (4' 7\")   Wt 55.6 kg (122 lb 9.2 oz)   SpO2 98%   BMI 28.49 kg/m²   No intake or output data in the 24 hours ending 01/03/25 1523  Gen: AAO, NAD  Neck: No JVD  Cardiac: RRR  Resp: clear BS  Abd: Soft, non tender, +BS, non distended   Ext: No edema   Access: RUE AVF  Neuro: moves 4 ext  Peripheral Pulses: weak peripheral pulses.  Skin: Skin color, texture, turgor normal, no suspicious rashes or lesions.    Labs:  Results for orders placed or performed during the hospital encounter of 12/29/24 (from the past 24 hours)   CBC and Auto Differential   Result Value Ref Range    WBC 4.7 4.4 - 11.3 x10*3/uL    nRBC 0.0 0.0 - 0.0 /100 WBCs    RBC 4.09 4.00 - 5.20 x10*6/uL    Hemoglobin 11.9 (L) 12.0 - 16.0 g/dL    Hematocrit 37.7 36.0 - 46.0 %    MCV 92 80 - 100 fL    MCH 29.1 26.0 - 34.0 pg    MCHC 31.6 (L) 32.0 - 36.0 g/dL    RDW 15.7 (H) 11.5 - 14.5 %    Platelets 192 150 - 450 x10*3/uL    Neutrophils % 56.1 40.0 - 80.0 %    Immature Granulocytes %, Automated 0.4 0.0 - 0.9 %    Lymphocytes % 19.9 13.0 - 44.0 %    Monocytes % 10.1 2.0 - 10.0 %    Eosinophils % 12.7 0.0 - 6.0 %    Basophils % 0.8 0.0 - 2.0 %    Neutrophils Absolute 2.65 1.20 - 7.70 x10*3/uL    Immature Granulocytes Absolute, Automated 0.02 0.00 - 0.70 x10*3/uL    Lymphocytes Absolute 0.94 (L) 1.20 - 4.80 x10*3/uL    Monocytes Absolute 0.48 0.10 - 1.00 x10*3/uL    Eosinophils Absolute 0.60 0.00 - 0.70 x10*3/uL    Basophils Absolute 0.04 0.00 - 0.10 x10*3/uL   Renal Function Panel   Result Value Ref Range    Glucose 141 (H) 74 - 99 mg/dL    Sodium 133 (L) 136 - 145 mmol/L    Potassium 5.1 3.5 - 5.3 mmol/L    Chloride 91 (L) 98 - " 107 mmol/L    Bicarbonate 27 21 - 32 mmol/L    Anion Gap 20 10 - 20 mmol/L    Urea Nitrogen 58 (H) 6 - 23 mg/dL    Creatinine 7.82 (H) 0.50 - 1.05 mg/dL    eGFR 6 (L) >60 mL/min/1.73m*2    Calcium 9.9 8.6 - 10.6 mg/dL    Phosphorus 6.4 (H) 2.5 - 4.9 mg/dL    Albumin 3.9 3.4 - 5.0 g/dL   Magnesium   Result Value Ref Range    Magnesium 2.83 (H) 1.60 - 2.40 mg/dL   Transthoracic Echo (TTE) Limited   Result Value Ref Range    AV pk malu 1.78 m/s    LVOT diam 1.68 cm    MV E/A ratio 0.80     LA vol index A/L 54.2 ml/m2    Tricuspid annular plane systolic excursion 1.7 cm    LV EF 53 %    RV free wall pk S' 10.00 cm/s    LV GLS 10.8 %    LVIDd 3.93 cm    RVSP 32.7 mmHg    Aortic Valve Area by Continuity of Peak Velocity 1.87 cm2    AV pk grad 13 mmHg    LV A4C EF 45.0      *Note: Due to a large number of results and/or encounters for the requested time period, some results have not been displayed. A complete set of results can be found in Results Review.        DATA:   Diagnostic tests reviewed for today's visit:    New labs and imaging     Assessment and Plan:  Pt admitted with uncontrolled BP and flash pulmonary edema   - ESKD on HD: likely still carrying fluids but pt is very reluctant to permit UF during her dialysis, also refusing additional IUF today  Had HD yesterday, next routine dialysis tomorrow on her usual TTS schedule, will try to remove at least 2L, if patients allow  We had a discussion about possible PD and she is interested, concerning is the fact of possible non compliance, will have to evaluate further in OP setting   HTN: uncontrolled, hx of LVH, resistant on several meds, discussed with 1ry team and her usual nephrologist Dr Barraza, consider adding doxazosin, will get new echo and cardiology consult to aid  Lytes and acid base: acceptable   Hb 11.9    Will continue to follow.     Signature: Dilip Calderon MD

## 2025-01-03 NOTE — CARE PLAN
The patient's goals for the shift include      The clinical goals for the shift include PT will be injury free this shift      Problem: Pain - Adult  Goal: Verbalizes/displays adequate comfort level or baseline comfort level  Outcome: Progressing     Problem: Chronic Conditions and Co-morbidities  Goal: Patient's chronic conditions and co-morbidity symptoms are monitored and maintained or improved  Outcome: Progressing     Problem: Fall/Injury  Goal: Be free from injury by end of the shift  Outcome: Progressing     Problem: Diabetes  Goal: Increase stability of blood glucose readings by end of shift  Outcome: Progressing     Problem: Pain  Goal: Turns in bed with improved pain control throughout the shift  Outcome: Progressing

## 2025-01-03 NOTE — CARE PLAN
Transitional Care Coordinator Note: Patient discussed with medical team, per medical team patient is not medically ready. Patient still need to wean off O2 and better BP control.   Discharge dispo: Home.  ADOD 1-2 Days  Lianet Peacock RN  Transitional Care Coordinator

## 2025-01-03 NOTE — CARE PLAN
The patient's goals for the shift include      The clinical goals for the shift include hemodynamically stable    Ov

## 2025-01-03 NOTE — PROGRESS NOTES
"Stacey Heath is a 54 y.o. female on day 5 of admission presenting with Flash pulmonary edema.    Subjective   The patient was examined at bedside this morning. No acute events overnight. She was laying comfortably in bed and in no acute distress. She reported her breathing is better but still has a headache. She also reported that she is getting a lower dose of her gabapentin than she does at home and her feet were tingling all night.        Objective     Physical Exam  Constitutional:       General: She is not in acute distress.     Appearance: Normal appearance. She is not ill-appearing.   HENT:      Head: Normocephalic and atraumatic.   Eyes:      Extraocular Movements: Extraocular movements intact.      Conjunctiva/sclera: Conjunctivae normal.   Cardiovascular:      Rate and Rhythm: Normal rate and regular rhythm.      Heart sounds: Normal heart sounds. No murmur heard.     No friction rub. No gallop.   Pulmonary:      Effort: Pulmonary effort is normal. No respiratory distress.      Breath sounds: Normal breath sounds. No stridor. No wheezing, rhonchi or rales.      Comments: On 2L NC  Musculoskeletal:      Cervical back: Normal range of motion.      Right lower leg: No edema.      Left lower leg: No edema.   Neurological:      Mental Status: She is alert.   Psychiatric:         Mood and Affect: Mood normal.         Behavior: Behavior normal.         Last Recorded Vitals  Blood pressure (!) 188/82, pulse 78, temperature 36.7 °C (98.1 °F), resp. rate 18, height 1.397 m (4' 7\"), weight 55.6 kg (122 lb 9.2 oz), SpO2 95%.  Intake/Output last 3 Shifts:  I/O last 3 completed shifts:  In: 1200 (21.6 mL/kg) [I.V.:800 (14.4 mL/kg); Other:400]  Out: 1845 (33.2 mL/kg) [Other:1845]  Weight: 55.6 kg     Relevant Results                        Assessment/Plan   Stacey Heath is a 54 y.o. female who was admitted to the MICU on 12/29/24 for hypertensive emergency c/b flash pulmonary edema. She has been treated with cardene " "gtt and received UF and HD with nephrology. She was then transitioned off cardene gtt and initially stable to for floor but the was noted to severe HA noted to be \"worst headache of my life\" with associated lethargy and photophobia and worsening severe HTN necessitate resumption of cardene gtt. On morning of 12/31/24, cardene gtt was weaned off and patient resume home anti-HTN regimen and noted to maintain goal SBP<180. The patient was transferred to regular nursing floor.   Assessment & Plan  Flash pulmonary edema    ESRD (end stage renal disease) on dialysis (Multi)    #HTN Emergency c/b Flash Pulmonary Edema (improving)   #HFpEF  #Troponemia 2/2 T2 MI iso HTN, ESRD  #AHRF (resolving)  #Flash Pulmonary Edema (resolved)  :: Baseline home GDMT/BP medications: carvedilol 25 mg BID, clonidine 0.2 mg at bedtime, nifedipine ER 90 mg at bedtime, torsemide 20 mg on HD/BID on nHD days, valsartan 320 mg at bedtime  :: HS Trop peaked at 193, now downtrended   - s/p cardene drip in MICU  - c/w home BP meds  - SBP goal <180, not at goal, increased Clonidine from 0.2 mg to 0.3 mg BID  - If BP still not at goal, consider adding Hydralazine as all other antihypertensives are at max dose  - Currently on 2L NC, wean as tolerated.     #ESRD on iHD Tu/Th/SAT  -Restarted home nephrocaps/phoslo   -Nephrology consulted, appreciate recommendations: underwent UF but experienced large drop in BP, can consider restarting home Epogen at their discretion  -c/whome Torsemide 20mg after HD on dialysis days and 20mg BID on non-HD days      #Hyperkalemia (Resolved)  :: Found to be hyperkalemic to 5.7 with peaked T waves on EKG  on admission likely 2/2 missing HD   :: Received 1 dose Lokelma with f/u RFP showing K 5.2   -Daily RFPs       #Hx of Non-obstructive LUE Brachial V and Basilic V DVT (4/14/2024)  :: Per chart review noted to be provoked I/s/o PICC placement  :: Baseline on Eliquis 5mg BID  -Continue with Eliquis 5 mg BID, potentially can " be discontinued as >6 months therapy has been completed iso provoked DVT  - Repeat LUE duplex US pending     #HLD  -Continue home statin: atorvastatin 80 mg QHS     #COPD  -Continue home inhalers: albuterol PRN, Breo Ellipta daily      #Anemia of Chronic Disease  -C/w Epogen TID w/ HD     #Peripheral Neuropathy  -Restarted home gabapentin 100mg daily     #GERD  -Continue home Pantoprazole 40 mg PRN     The patient was discussed with Dr. Sen.    Robby Chen MD  Family Medicine  PGY-3

## 2025-01-04 ENCOUNTER — APPOINTMENT (OUTPATIENT)
Dept: DIALYSIS | Facility: HOSPITAL | Age: 55
End: 2025-01-04
Payer: COMMERCIAL

## 2025-01-04 LAB
ALBUMIN SERPL BCP-MCNC: 3.6 G/DL (ref 3.4–5)
ANION GAP SERPL CALC-SCNC: 22 MMOL/L (ref 10–20)
BASOPHILS # BLD AUTO: 0.06 X10*3/UL (ref 0–0.1)
BASOPHILS NFR BLD AUTO: 1.3 %
BNP SERPL-MCNC: 920 PG/ML (ref 0–99)
BUN SERPL-MCNC: 82 MG/DL (ref 6–23)
CALCIUM SERPL-MCNC: 9.9 MG/DL (ref 8.6–10.6)
CHLORIDE SERPL-SCNC: 90 MMOL/L (ref 98–107)
CO2 SERPL-SCNC: 27 MMOL/L (ref 21–32)
CREAT SERPL-MCNC: 9.59 MG/DL (ref 0.5–1.05)
EGFRCR SERPLBLD CKD-EPI 2021: 4 ML/MIN/1.73M*2
EOSINOPHIL # BLD AUTO: 0.76 X10*3/UL (ref 0–0.7)
EOSINOPHIL NFR BLD AUTO: 16.4 %
ERYTHROCYTE [DISTWIDTH] IN BLOOD BY AUTOMATED COUNT: 15.6 % (ref 11.5–14.5)
GLUCOSE SERPL-MCNC: 99 MG/DL (ref 74–99)
HCT VFR BLD AUTO: 33.7 % (ref 36–46)
HGB BLD-MCNC: 10.5 G/DL (ref 12–16)
IMM GRANULOCYTES # BLD AUTO: 0.02 X10*3/UL (ref 0–0.7)
IMM GRANULOCYTES NFR BLD AUTO: 0.4 % (ref 0–0.9)
LYMPHOCYTES # BLD AUTO: 1.09 X10*3/UL (ref 1.2–4.8)
LYMPHOCYTES NFR BLD AUTO: 23.5 %
MAGNESIUM SERPL-MCNC: 2.96 MG/DL (ref 1.6–2.4)
MCH RBC QN AUTO: 29.2 PG (ref 26–34)
MCHC RBC AUTO-ENTMCNC: 31.2 G/DL (ref 32–36)
MCV RBC AUTO: 94 FL (ref 80–100)
MONOCYTES # BLD AUTO: 0.54 X10*3/UL (ref 0.1–1)
MONOCYTES NFR BLD AUTO: 11.6 %
NEUTROPHILS # BLD AUTO: 2.17 X10*3/UL (ref 1.2–7.7)
NEUTROPHILS NFR BLD AUTO: 46.8 %
NRBC BLD-RTO: 0 /100 WBCS (ref 0–0)
PHOSPHATE SERPL-MCNC: 6.3 MG/DL (ref 2.5–4.9)
PLATELET # BLD AUTO: 175 X10*3/UL (ref 150–450)
POTASSIUM SERPL-SCNC: 5.9 MMOL/L (ref 3.5–5.3)
RBC # BLD AUTO: 3.59 X10*6/UL (ref 4–5.2)
SODIUM SERPL-SCNC: 133 MMOL/L (ref 136–145)
WBC # BLD AUTO: 4.6 X10*3/UL (ref 4.4–11.3)

## 2025-01-04 PROCEDURE — 2500000004 HC RX 250 GENERAL PHARMACY W/ HCPCS (ALT 636 FOR OP/ED): Performed by: STUDENT IN AN ORGANIZED HEALTH CARE EDUCATION/TRAINING PROGRAM

## 2025-01-04 PROCEDURE — 2500000001 HC RX 250 WO HCPCS SELF ADMINISTERED DRUGS (ALT 637 FOR MEDICARE OP): Performed by: STUDENT IN AN ORGANIZED HEALTH CARE EDUCATION/TRAINING PROGRAM

## 2025-01-04 PROCEDURE — 83735 ASSAY OF MAGNESIUM: CPT

## 2025-01-04 PROCEDURE — 99233 SBSQ HOSP IP/OBS HIGH 50: CPT | Performed by: STUDENT IN AN ORGANIZED HEALTH CARE EDUCATION/TRAINING PROGRAM

## 2025-01-04 PROCEDURE — 99222 1ST HOSP IP/OBS MODERATE 55: CPT | Performed by: STUDENT IN AN ORGANIZED HEALTH CARE EDUCATION/TRAINING PROGRAM

## 2025-01-04 PROCEDURE — 2500000001 HC RX 250 WO HCPCS SELF ADMINISTERED DRUGS (ALT 637 FOR MEDICARE OP)

## 2025-01-04 PROCEDURE — 2500000005 HC RX 250 GENERAL PHARMACY W/O HCPCS

## 2025-01-04 PROCEDURE — 36415 COLL VENOUS BLD VENIPUNCTURE: CPT

## 2025-01-04 PROCEDURE — 2500000002 HC RX 250 W HCPCS SELF ADMINISTERED DRUGS (ALT 637 FOR MEDICARE OP, ALT 636 FOR OP/ED)

## 2025-01-04 PROCEDURE — 83880 ASSAY OF NATRIURETIC PEPTIDE: CPT | Performed by: INTERNAL MEDICINE

## 2025-01-04 PROCEDURE — 2500000005 HC RX 250 GENERAL PHARMACY W/O HCPCS: Performed by: STUDENT IN AN ORGANIZED HEALTH CARE EDUCATION/TRAINING PROGRAM

## 2025-01-04 PROCEDURE — 8010000001 HC DIALYSIS - HEMODIALYSIS PER DAY

## 2025-01-04 PROCEDURE — 90935 HEMODIALYSIS ONE EVALUATION: CPT | Performed by: INTERNAL MEDICINE

## 2025-01-04 PROCEDURE — 80069 RENAL FUNCTION PANEL: CPT

## 2025-01-04 PROCEDURE — 2500000004 HC RX 250 GENERAL PHARMACY W/ HCPCS (ALT 636 FOR OP/ED)

## 2025-01-04 PROCEDURE — 85025 COMPLETE CBC W/AUTO DIFF WBC: CPT

## 2025-01-04 PROCEDURE — 1200000002 HC GENERAL ROOM WITH TELEMETRY DAILY

## 2025-01-04 RX ORDER — DIPHENHYDRAMINE HYDROCHLORIDE 50 MG/ML
50 INJECTION INTRAMUSCULAR; INTRAVENOUS ONCE
Status: COMPLETED | OUTPATIENT
Start: 2025-01-04 | End: 2025-01-04

## 2025-01-04 RX ORDER — HYDRALAZINE HYDROCHLORIDE 50 MG/1
50 TABLET, FILM COATED ORAL 3 TIMES DAILY
Status: DISCONTINUED | OUTPATIENT
Start: 2025-01-04 | End: 2025-01-05

## 2025-01-04 RX ORDER — METOPROLOL SUCCINATE 100 MG/1
100 TABLET, EXTENDED RELEASE ORAL DAILY
Status: DISPENSED | OUTPATIENT
Start: 2025-01-04

## 2025-01-04 RX ORDER — GABAPENTIN 300 MG/1
300 CAPSULE ORAL NIGHTLY
Status: DISPENSED | OUTPATIENT
Start: 2025-01-04

## 2025-01-04 RX ORDER — DEXAMETHASONE SODIUM PHOSPHATE 10 MG/ML
4 INJECTION INTRAMUSCULAR; INTRAVENOUS ONCE
Status: COMPLETED | OUTPATIENT
Start: 2025-01-04 | End: 2025-01-04

## 2025-01-04 RX ORDER — CLONIDINE HYDROCHLORIDE 0.1 MG/1
0.3 TABLET ORAL EVERY 8 HOURS SCHEDULED
Status: DISCONTINUED | OUTPATIENT
Start: 2025-01-04 | End: 2025-01-04

## 2025-01-04 RX ORDER — CLONIDINE HYDROCHLORIDE 0.1 MG/1
0.3 TABLET ORAL EVERY 8 HOURS SCHEDULED
Status: DISPENSED | OUTPATIENT
Start: 2025-01-04

## 2025-01-04 RX ORDER — DILTIAZEM HYDROCHLORIDE 180 MG/1
180 CAPSULE, COATED, EXTENDED RELEASE ORAL DAILY
Status: DISPENSED | OUTPATIENT
Start: 2025-01-05

## 2025-01-04 RX ORDER — ISOSORBIDE MONONITRATE 30 MG/1
60 TABLET, EXTENDED RELEASE ORAL DAILY
Status: DISCONTINUED | OUTPATIENT
Start: 2025-01-04 | End: 2025-01-04

## 2025-01-04 RX ORDER — HYDRALAZINE HYDROCHLORIDE 50 MG/1
50 TABLET, FILM COATED ORAL 3 TIMES DAILY
Status: DISCONTINUED | OUTPATIENT
Start: 2025-01-04 | End: 2025-01-04

## 2025-01-04 RX ADMIN — HYDRALAZINE HYDROCHLORIDE 50 MG: 25 TABLET ORAL at 18:15

## 2025-01-04 RX ADMIN — CLONIDINE HYDROCHLORIDE 0.3 MG: 0.1 TABLET ORAL at 22:00

## 2025-01-04 RX ADMIN — METOPROLOL SUCCINATE 100 MG: 100 TABLET, EXTENDED RELEASE ORAL at 18:15

## 2025-01-04 RX ADMIN — ACETAMINOPHEN 975 MG: 325 TABLET ORAL at 18:15

## 2025-01-04 RX ADMIN — ASCORBIC ACID, THIAMINE MONONITRATE,RIBOFLAVIN, NIACINAMIDE, PYRIDOXINE HYDROCHLORIDE, FOLIC ACID, CYANOCOBALAMIN, BIOTIN, CALCIUM PANTOTHENATE, 1 CAPSULE: 100; 1.5; 1.7; 20; 10; 1; 6000; 150000; 5 CAPSULE, LIQUID FILLED ORAL at 12:54

## 2025-01-04 RX ADMIN — ASPIRIN 81 MG: 81 TABLET, COATED ORAL at 12:54

## 2025-01-04 RX ADMIN — DOXAZOSIN 4 MG: 4 TABLET ORAL at 12:55

## 2025-01-04 RX ADMIN — Medication 2100 L/MIN: at 21:00

## 2025-01-04 RX ADMIN — DIPHENHYDRAMINE HYDROCHLORIDE 50 MG: 50 INJECTION INTRAMUSCULAR; INTRAVENOUS at 18:14

## 2025-01-04 RX ADMIN — APIXABAN 5 MG: 5 TABLET, FILM COATED ORAL at 12:54

## 2025-01-04 RX ADMIN — ACETAMINOPHEN 975 MG: 325 TABLET ORAL at 01:43

## 2025-01-04 RX ADMIN — CLONIDINE HYDROCHLORIDE 0.3 MG: 0.1 TABLET ORAL at 10:20

## 2025-01-04 RX ADMIN — ATORVASTATIN CALCIUM 80 MG: 80 TABLET, FILM COATED ORAL at 20:01

## 2025-01-04 RX ADMIN — GABAPENTIN 300 MG: 300 CAPSULE ORAL at 20:00

## 2025-01-04 RX ADMIN — SUMATRIPTAN 25 MG: 25 TABLET, FILM COATED ORAL at 07:12

## 2025-01-04 RX ADMIN — VALSARTAN 320 MG: 160 TABLET, FILM COATED ORAL at 20:03

## 2025-01-04 RX ADMIN — DIPHENHYDRAMINE HYDROCHLORIDE 50 MG: 50 INJECTION, SOLUTION INTRAMUSCULAR; INTRAVENOUS at 23:48

## 2025-01-04 RX ADMIN — APIXABAN 5 MG: 5 TABLET, FILM COATED ORAL at 20:00

## 2025-01-04 RX ADMIN — FLUTICASONE PROPIONATE 2 SPRAY: 50 SPRAY, METERED NASAL at 12:56

## 2025-01-04 RX ADMIN — HYDRALAZINE HYDROCHLORIDE 25 MG: 25 TABLET ORAL at 20:00

## 2025-01-04 RX ADMIN — PETROLATUM: 420 OINTMENT TOPICAL at 21:50

## 2025-01-04 RX ADMIN — SEVELAMER CARBONATE 800 MG: 800 TABLET, FILM COATED ORAL at 18:15

## 2025-01-04 RX ADMIN — CARVEDILOL 25 MG: 25 TABLET, FILM COATED ORAL at 12:54

## 2025-01-04 RX ADMIN — Medication: at 21:49

## 2025-01-04 RX ADMIN — DEXTROSE MONOHYDRATE 500 MG: 50 INJECTION, SOLUTION INTRAVENOUS at 18:20

## 2025-01-04 RX ADMIN — TORSEMIDE 20 MG: 20 TABLET ORAL at 20:01

## 2025-01-04 RX ADMIN — TORSEMIDE 20 MG: 20 TABLET ORAL at 12:54

## 2025-01-04 RX ADMIN — DEXAMETHASONE SODIUM PHOSPHATE 4 MG: 10 INJECTION INTRAMUSCULAR; INTRAVENOUS at 23:48

## 2025-01-04 RX ADMIN — SUMATRIPTAN 25 MG: 25 TABLET, FILM COATED ORAL at 14:26

## 2025-01-04 RX ADMIN — Medication 2 L/MIN: at 08:00

## 2025-01-04 RX ADMIN — CLONIDINE HYDROCHLORIDE 0.3 MG: 0.1 TABLET ORAL at 18:15

## 2025-01-04 RX ADMIN — METOCLOPRAMIDE 5 MG: 10 TABLET ORAL at 20:03

## 2025-01-04 RX ADMIN — ISOSORBIDE MONONITRATE 60 MG: 30 TABLET, EXTENDED RELEASE ORAL at 12:53

## 2025-01-04 RX ADMIN — SEVELAMER CARBONATE 800 MG: 800 TABLET, FILM COATED ORAL at 12:54

## 2025-01-04 RX ADMIN — DEXAMETHASONE SODIUM PHOSPHATE 4 MG: 10 INJECTION, SOLUTION INTRAMUSCULAR; INTRAVENOUS at 18:14

## 2025-01-04 ASSESSMENT — COGNITIVE AND FUNCTIONAL STATUS - GENERAL
CLIMB 3 TO 5 STEPS WITH RAILING: A LITTLE
MOBILITY SCORE: 23
DAILY ACTIVITIY SCORE: 24

## 2025-01-04 ASSESSMENT — PAIN SCALES - GENERAL
PAINLEVEL_OUTOF10: 0 - NO PAIN
PAINLEVEL_OUTOF10: 8
PAINLEVEL_OUTOF10: 7

## 2025-01-04 ASSESSMENT — PAIN - FUNCTIONAL ASSESSMENT
PAIN_FUNCTIONAL_ASSESSMENT: NO/DENIES PAIN
PAIN_FUNCTIONAL_ASSESSMENT: 0-10

## 2025-01-04 ASSESSMENT — PAIN DESCRIPTION - DESCRIPTORS: DESCRIPTORS: CRUSHING

## 2025-01-04 NOTE — CARE PLAN
The patient's goals for the shift include      The clinical goals for the shift include pt will not fall by end of shift  Problem: Chronic Conditions and Co-morbidities  Goal: Patient's chronic conditions and co-morbidity symptoms are monitored and maintained or improved  Outcome: Progressing     Problem: Discharge Planning  Goal: Discharge to home or other facility with appropriate resources  Outcome: Progressing

## 2025-01-04 NOTE — CARE PLAN
Problem: Pain - Adult  Goal: Verbalizes/displays adequate comfort level or baseline comfort level  Outcome: Progressing     Problem: Safety - Adult  Goal: Free from fall injury  Outcome: Progressing     Problem: Discharge Planning  Goal: Discharge to home or other facility with appropriate resources  Outcome: Progressing     Problem: Chronic Conditions and Co-morbidities  Goal: Patient's chronic conditions and co-morbidity symptoms are monitored and maintained or improved  Outcome: Progressing     Problem: Skin  Goal: Decreased wound size/increased tissue granulation at next dressing change  Outcome: Progressing  Goal: Promote skin healing  Outcome: Progressing     Problem: Fall/Injury  Goal: Not fall by end of shift  Outcome: Progressing  Goal: Be free from injury by end of the shift  Outcome: Progressing  Goal: Verbalize understanding of personal risk factors for fall in the hospital  Outcome: Progressing  Goal: Verbalize understanding of risk factor reduction measures to prevent injury from fall in the home  Outcome: Progressing  Goal: Use assistive devices by end of the shift  Outcome: Progressing  Goal: Pace activities to prevent fatigue by end of the shift  Outcome: Progressing     Problem: Diabetes  Goal: Achieve decreasing blood glucose levels by end of shift  Outcome: Progressing  Goal: Increase stability of blood glucose readings by end of shift  Outcome: Progressing  Goal: Decrease in ketones present in urine by end of shift  Outcome: Progressing  Goal: Maintain electrolyte levels within acceptable range throughout shift  Outcome: Progressing  Goal: Maintain glucose levels >70mg/dl to <250mg/dl throughout shift  Outcome: Progressing  Goal: No changes in neurological exam by end of shift  Outcome: Progressing  Goal: Learn about and adhere to nutrition recommendations by end of shift  Outcome: Progressing  Goal: Vital signs within normal range for age by end of shift  Outcome: Progressing  Goal: Increase  self care and/or family involovement by end of shift  Outcome: Progressing  Goal: Receive DSME education by end of shift  Outcome: Progressing     Problem: Pain  Goal: Takes deep breaths with improved pain control throughout the shift  Outcome: Progressing  Goal: Turns in bed with improved pain control throughout the shift  Outcome: Progressing  Goal: Walks with improved pain control throughout the shift  Outcome: Progressing  Goal: Performs ADL's with improved pain control throughout shift  Outcome: Progressing  Goal: Participates in PT with improved pain control throughout the shift  Outcome: Progressing  Goal: Free from opioid side effects throughout the shift  Outcome: Progressing  Goal: Free from acute confusion related to pain meds throughout the shift  Outcome: Progressing     Problem: Nutrition  Goal: Less than 5 days NPO/clear liquids  Outcome: Progressing  Goal: Oral intake greater than 50%  Outcome: Progressing  Goal: Oral intake greater 75%  Outcome: Progressing  Goal: Consume prescribed supplement  Outcome: Progressing  Goal: Adequate PO fluid intake  Outcome: Progressing  Goal: Nutrition support goals are met within 48 hrs  Outcome: Progressing  Goal: Nutrition support is meeting 75% of nutrient needs  Outcome: Progressing  Goal: Tube feed tolerance  Outcome: Progressing  Goal: BG  mg/dL  Outcome: Progressing  Goal: Lab values WNL  Outcome: Progressing  Goal: Electrolytes WNL  Outcome: Progressing  Goal: Promote healing  Outcome: Progressing  Goal: Maintain stable weight  Outcome: Progressing  Goal: Reduce weight from edema/fluid  Outcome: Progressing  Goal: Gradual weight gain  Outcome: Progressing  Goal: Improve ostomy output  Outcome: Progressing   The patient's goals for the shift include      The clinical goals for the shift include pt will not fall by end of shift

## 2025-01-04 NOTE — CONSULTS
Inpatient consult to Cardiology  Consult performed by: Roseanna Daley MD  Consult ordered by: Justin Sen DO        Inpatient Cardiology Consult Note    Reason for consult: Flash Pulm edema    HPI:   Stacey Heath is a 54 y.o. female who was admitted to the MICU on 24 for hypertensive emergency c/b flash pulmonary edema. She has been treated with cardene gtt and received UF and HD with nephrology .  Cardiology is consulted for management of hypertension.    Spoke with patient, who states that she has had multiple admissions for shortness of breath, where she has fluid buildup on her lungs.  She states that she had at least 12 admissions this year.  She denies any history of chest pain, but endorses shortness of breath that comes on out of nowhere.  She endorses medication adherence at home, states that she is on at least 6 blood pressure medicines, had been on up to 9 blood pressure medicines in the past, with no avail requiring multiple admissions.  She denies missing any of her dialysis sessions.  She endorses chronic headaches, that have been getting progressively worse, she denies any vision changes, endorses occasional numbness tingling but nothing currently.  Denies any overt weakness.    All system reviewed and negative unless mentioned above.     Cardiac studies:   [id number]: sinus tach with LVH and YULIA    Echo: 2025   1. The left ventricular systolic function is low normal, with a visually estimated ejection fraction of 50-55%.   2. Left ventricular diastolic filling cannot be determined, due to severe mitral annular calcification (MAC).   3. There is severely increased septal thickness.   4. There is severely increased concentric left ventricular hypertrophy.   5. There is mildly reduced right ventricular systolic function.   6. The left atrium is severely dilated.   7. There is moderate to severe mitral annular calcification.   8. Mild to moderate mitral valve regurgitation.   9.  Slightly elevated right ventricular systolic pressure.  10. Strain values are abnormal, which imply subclinical myocardial dysfunction.  Stress:   12/14/2023   1. SPECT Perfusion Study: Normal.    2. There is no scintigraphic evidence for inducible ischemia.    3. No evidence of scarred myocardium.    4. Left ventricle is normal in size. The left ventricle systolic   function is normal.    5. Right ventricle is normal in size. The right ventricle systolic   function is normal.    6. This is a low risk scan.         Current Facility-Administered Medications:     acetaminophen (Tylenol) tablet 975 mg, 975 mg, oral, q8h, Robby Chen MD, 975 mg at 01/04/25 0143    albuterol 2.5 mg /3 mL (0.083 %) nebulizer solution 2.5 mg, 2.5 mg, nebulization, q6h PRN, Fili Dixon MD    ammonium lactate (Lac-Hydrin) 12 % lotion, , Topical, BID, Justin Sen DO, Given at 01/03/25 2134    apixaban (Eliquis) tablet 5 mg, 5 mg, oral, BID, Fili Dixon MD, 5 mg at 01/04/25 1254    aspirin EC tablet 81 mg, 81 mg, oral, Daily, Fili Dixon MD, 81 mg at 01/04/25 1254    atorvastatin (Lipitor) tablet 80 mg, 80 mg, oral, Nightly, Fili Dixon MD, 80 mg at 01/03/25 2130    carvedilol (Coreg) tablet 25 mg, 25 mg, oral, BID, Fili Dixon MD, 25 mg at 01/04/25 1254    cloNIDine (Catapres) tablet 0.3 mg, 0.3 mg, oral, BID, Robby Chen MD, 0.3 mg at 01/04/25 1020    diclofenac sodium (Voltaren) 1 % gel 4 g, 4 g, Topical, 4x daily PRN, Fili Dixon MD, 4 g at 01/03/25 0217    diphenhydrAMINE (BENADryl) capsule 50 mg, 50 mg, oral, q6h PRN, Justin TAY Sen, DO, 50 mg at 01/03/25 2131    doxazosin (Cardura) tablet 4 mg, 4 mg, oral, Daily, Justin TAY Sen, DO, 4 mg at 01/04/25 1255    fluticasone (Flonase) nasal spray 2 spray, 2 spray, Each Nostril, Daily, Fili Dixon MD, 2 spray at 01/04/25 1256    fluticasone furoate-vilanteroL (Breo Ellipta) 100-25 mcg/dose inhaler 1  puff, 1 puff, inhalation, Daily, Fili Dixon MD, 1 puff at 12/31/24 0843    gabapentin (Neurontin) capsule 300 mg, 300 mg, oral, Daily, Robby Chen MD, 300 mg at 01/03/25 1145    hydrALAZINE (Apresoline) tablet 25 mg, 25 mg, oral, q6h PRN, Firas R Sen, DO, 25 mg at 01/03/25 1205    ipratropium-albuteroL (Duo-Neb) 0.5-2.5 mg/3 mL nebulizer solution 3 mL, 3 mL, nebulization, q6h PRN, Fili Dixon MD    isosorbide mononitrate ER (Imdur) 24 hr tablet 60 mg, 60 mg, oral, Daily, Firas R Sen, DO, 60 mg at 01/04/25 1253    lidocaine (LMX) 4 % cream, , Topical, PRN, Fili Dixon MD, Given at 01/03/25 2347    lidocaine 4 % patch 1 patch, 1 patch, transdermal, Daily, Fili Dixon MD, 1 patch at 01/03/25 1119    metoclopramide (Reglan) tablet 5 mg, 5 mg, oral, q6h, 5 mg at 01/03/25 2347 **OR** [DISCONTINUED] metoclopramide (Reglan) injection 5 mg, 5 mg, intravenous, q6h, Robby Chen MD    NIFEdipine ER (Adalat CC) 24 hr tablet 90 mg, 90 mg, oral, Nightly, Fili Dixon MD, 90 mg at 01/02/25 2030    ondansetron (Zofran) injection 4 mg, 4 mg, intravenous, q8h PRN, Fili Dixon MD, 4 mg at 12/30/24 1817    oxygen (O2) therapy, , inhalation, Continuous - Inhalation, Fili Dixon MD, 2 L/min at 01/04/25 0800    perflutren lipid microspheres (Definity) injection 0.5-10 mL of dilution, 0.5-10 mL of dilution, intravenous, Once in imaging, Justin Sen DO    polyethylene glycol (Glycolax, Miralax) packet 17 g, 17 g, oral, Daily, Fili Dixon MD, 17 g at 01/03/25 1118    pramoxine (Sarna Sensitive) 1 % lotion, , Topical, PRN, Fili Dixon MD    sevelamer carbonate (Renvela) tablet 800 mg, 800 mg, oral, TID, Fili Dixon MD, 800 mg at 01/04/25 1254    sodium chloride (Ocean) 0.65 % nasal spray 2 spray, 2 spray, Each Nostril, 4x daily PRN, Justin Sen DO    SUMAtriptan (Imitrex) tablet 25 mg, 25 mg, oral, q2h  PRN, Fili Dixon MD, 25 mg at 01/04/25 0712    torsemide (Demadex) tablet 20 mg, 20 mg, oral, Every Mon/Wed/Fri, Fili Dixon MD, 20 mg at 01/03/25 1734    torsemide (Demadex) tablet 20 mg, 20 mg, oral, 2 times per day on Sunday Tuesday Thursday Saturday, Fili Dixon MD, 20 mg at 01/04/25 1254    valsartan (Diovan) tablet 320 mg, 320 mg, oral, q PM, Fili Dixon MD, 320 mg at 01/02/25 2031    vitamin B complex-vitamin C-folic acid (Nephrocaps) capsule 1 capsule, 1 capsule, oral, Daily, Fili Dixon MD, 1 capsule at 01/04/25 1254    white petrolatum (Aquaphor) ointment, , Topical, BID, Fili Dixon MD, Given at 01/03/25 2132    Objective:    Vitals:    01/04/25 1237   BP: (!) 218/93   Pulse: 82   Resp: 16   Temp: 36.1 °C (97 °F)   SpO2: 94%       Exam:  GEN: NAD  HEENT: ATNC,  anicteric, no JVD  CV: RRR, no m/r/g  Pulm: CTAB  Abdomen: NTND  Ext: warm, no LE edema noted  Neuro: A+Ox3  Psych: appropriate      Labs/Imaging:     Results from last 72 hours   Lab Units 01/04/25  0543   SODIUM mmol/L 133*   POTASSIUM mmol/L 5.9*   CO2 mmol/L 27   BUN mg/dL 82*   CREATININE mg/dL 9.59*   MAGNESIUM mg/dL 2.96*   PHOSPHORUS mg/dL 6.3*      Results from last 72 hours   Lab Units 01/04/25  0543   WBC AUTO x10*3/uL 4.6   HEMOGLOBIN g/dL 10.5*   PLATELETS AUTO x10*3/uL 175        Assessment and Plan:   Stacey Heath is a 54 y.o. female who was admitted to the MICU on 12/29/24 for hypertensive emergency c/b flash pulmonary edema. She has been treated with cardene gtt and received UF and HD with nephrology. Cardiology is consulted for HTN management after patient was transferred to [facility].     #ESRD   #HTN  # Recurrent hypertensive emergency  # Recurrent flash pulmonary edema  # HFpEF  -It appears that patient has been recently started on multiple medications per the primary team and per nephrology.  -Continue carvedilol 25 mg twice daily  -Continue clonidine 0.3 mg  twice daily  -Continue nifedipine 90 mg  -Continue valsartan 320 mg daily  -Continue Imdur 60 mg daily  -Continue doxazosin 4 mg daily recently started.   -START hydralazine 50 mg TID and uptitrate as tolerated.   -Continue home torsemide.   -Cannot use grace antagonist 2/2 ESRD.   -She previously saw EVLS team for evaluation for renal stenosis, however the ultrasound and CTA did not show any high risk features per the EVLS last note.  Patient was supposed to be scheduled to see Dr. Morataya as outpatient for consideration for renal denervation.  -Consider re-engaging EVLS team for re-evaluation.   -Patient had some workup done for 2ry HTN, was supposed to follow with endocrine, unclear why there are no OP notes. May consider re-engaging endocrine to complete the eval as inpatient.     Thank you for this consult. Cardiology will continue to follow. Recommendations are preliminary until note is co-signed by an attending.     Roseanna Daley  Cardiology Fellow   PGY5

## 2025-01-04 NOTE — PROGRESS NOTES
Renal Staff HD Note    I visited and examined the patient on dialysis.   Tolerating treatment without event.    Labs:  BP Readings from Last 3 Encounters:   01/04/25 (!) 184/85   12/27/24 162/88   12/20/24 (!) 195/87     [unfilled]  Lab Results   Component Value Date    CREATININE 9.59 (H) 01/04/2025    BUN 82 (H) 01/04/2025     (L) 01/04/2025    K 5.9 (H) 01/04/2025    CL 90 (L) 01/04/2025    CO2 27 01/04/2025     Lab Results   Component Value Date    .0 (H) 01/01/2025    CALCIUM 9.9 01/04/2025    CAION 1.18 03/14/2023    PHOS 6.3 (H) 01/04/2025     @  Lab Results   Component Value Date    HGB 10.5 (L) 01/04/2025         Meds:   acetaminophen, 975 mg, q8h  ammonium lactate, , BID  apixaban, 5 mg, BID  aspirin, 81 mg, Daily  atorvastatin, 80 mg, Nightly  carvedilol, 25 mg, BID  cloNIDine, 0.3 mg, BID  doxazosin, 4 mg, Daily  fluticasone, 2 spray, Daily  fluticasone furoate-vilanteroL, 1 puff, Daily  gabapentin, 300 mg, Daily  isosorbide mononitrate ER, 60 mg, Daily  lidocaine, 1 patch, Daily  metoclopramide, 5 mg, q6h  NIFEdipine ER, 90 mg, Nightly  oxygen, , Continuous - Inhalation  perflutren lipid microspheres, 0.5-10 mL of dilution, Once in imaging  polyethylene glycol, 17 g, Daily  sevelamer carbonate, 800 mg, TID  torsemide, 20 mg, Every Mon/Wed/Fri  torsemide, 20 mg, 2 times per day on Sunday Tuesday Thursday Saturday  valsartan, 320 mg, q PM  vitamin B complex-vitamin C-folic acid, 1 capsule, Daily  white petrolatum, , BID         albuterol, 2.5 mg, q6h PRN  diclofenac sodium, 4 g, 4x daily PRN  diphenhydrAMINE, 50 mg, q6h PRN  hydrALAZINE, 25 mg, q6h PRN  ipratropium-albuteroL, 3 mL, q6h PRN  lidocaine, , PRN  ondansetron, 4 mg, q8h PRN  pramoxine, , PRN  sodium chloride, 2 spray, 4x daily PRN  SUMAtriptan, 25 mg, q2h PRN      @[email or web address]@  PRN medications: albuterol, diclofenac sodium, diphenhydrAMINE, hydrALAZINE, ipratropium-albuteroL, lidocaine, ondansetron, pramoxine, sodium chloride,  SUMAtriptan  Prior to Admission Medications   Prescriptions Last Dose Informant Patient Reported? Taking?   NIFEdipine ER (Adalat CC) 90 mg 24 hr tablet  Self No No   Sig: Take 1 tablet (90 mg) by mouth once daily at bedtime. Do not crush, chew, or split.   SUMAtriptan (Imitrex) 25 mg tablet  Self No No   Sig: Take 1 tablet (25 mg) by mouth 1 time if needed for migraine. May repeat dose once in 2 hours if no relief.  Do not exceed 2 doses in 24 hours.   acetaminophen (Tylenol) 325 mg tablet  Self No No   Sig: Take 2 tablets (650 mg) by mouth every 6 hours if needed for headaches.   albuterol (ProAir HFA) 90 mcg/actuation inhaler  Self No No   Sig: Inhale 2 puffs every 4 hours if needed for wheezing or shortness of breath.   albuterol 1.25 mg/3 mL nebulizer solution  Self No No   Sig: Take 3 mL (1.25 mg) by nebulization every 6 hours if needed for wheezing or shortness of breath.   apixaban (Eliquis) 5 mg tablet  Self No No   Sig: Take 1 tablet (5 mg) by mouth 2 times a day.   aspirin 81 mg EC tablet  Self No No   Sig: Take 1 tablet (81 mg) by mouth once daily.   atorvastatin (Lipitor) 80 mg tablet  Self No No   Sig: Take 1 tablet (80 mg) by mouth once daily at bedtime.   calcium acetate (Phoslo) 667 mg capsule  Self No No   Sig: Take 2 capsules (1,334 mg) by mouth 3 times daily (morning, midday, late afternoon).   carvedilol (Coreg) 25 mg tablet  Self No No   Sig: Take 1 tablet (25 mg) by mouth 2 times a day.   cloNIDine (Catapres) 0.1 mg tablet  Self No No   Sig: Take 2 tablets (0.2 mg) by mouth once daily at bedtime.   diclofenac sodium (Voltaren) 1 % gel  Self No No   Sig: Apply 4.5 inches (4 g) topically 4 times a day as needed (back pain).   docusate sodium (Colace) 100 mg capsule  Self No No   Sig: Take 1 capsule (100 mg) by mouth 2 times a day as needed for constipation.   epoetin joceline (Epogen,Procrit) 10,000 unit/mL injection  Self No No   Sig: Inject 1 mL (10,000 Units) under the skin 3 times a week.    fluticasone (Flonase) 50 mcg/actuation nasal spray  Self No No   Sig: Administer 2 sprays into each nostril once daily. Shake gently. Before first use, prime pump. After use, clean tip and replace cap.   Patient taking differently: Administer 2 sprays into each nostril once daily as needed for allergies. Shake gently. Before first use, prime pump. After use, clean tip and replace cap.   fluticasone furoate-vilanteroL (Breo Ellipta) 100-25 mcg/dose inhaler  Self No No   Sig: Inhale 1 puff once daily.   gabapentin (Neurontin) 300 mg capsule  Self No No   Sig: Take 1 capsule (300 mg) by mouth once daily at bedtime.   Patient taking differently: Take 1 capsule (300 mg) by mouth as needed at bedtime (pain).   lidocaine 4 % patch  Self No No   Sig: Place 1 patch over 12 hours on the skin once daily. Remove & discard patch within 12 hours or as directed by MD.   pantoprazole (ProtoNix) 40 mg EC tablet  Self No No   Sig: Take 1 tablet (40 mg) by mouth if needed (not regular take it). Do not crush, chew, or split.   polyethylene glycol (Glycolax, Miralax) 17 gram/dose powder  Self No No   Sig: Take 17 g (1 scoop dissolved in liquid) by mouth once daily.   torsemide (Demadex) 20 mg tablet  Self No No   Sig: Take 1 tablet after dialysis on dialysis days and twice daily on non dialysis days   valsartan (Diovan) 320 mg tablet Not Taking Self No No   Sig: Take 1 tablet (320 mg) by mouth once daily in the evening.   Patient not taking: Reported on 12/30/2024   vitamin B complex-vitamin C-folic acid (Nephrocaps) 1 mg capsule  Self No No   Sig: Take 1 capsule by mouth once daily.      Facility-Administered Medications: None     Current Facility-Administered Medications   Medication Dose Route Frequency Provider Last Rate Last Admin    acetaminophen (Tylenol) tablet 975 mg  975 mg oral q8h Robby Chen MD   975 mg at 01/04/25 0143    albuterol 2.5 mg /3 mL (0.083 %) nebulizer solution 2.5 mg  2.5 mg nebulization q6h PRN Hajjara A  MD Zack        ammonium lactate (Lac-Hydrin) 12 % lotion   Topical BID Firas R Sen, DO   Given at 01/03/25 2134    apixaban (Eliquis) tablet 5 mg  5 mg oral BID Fili Dixon MD   5 mg at 01/03/25 2131    aspirin EC tablet 81 mg  81 mg oral Daily Fili Dixon MD   81 mg at 01/03/25 1120    atorvastatin (Lipitor) tablet 80 mg  80 mg oral Nightly Fili Dixon MD   80 mg at 01/03/25 2130    carvedilol (Coreg) tablet 25 mg  25 mg oral BID Fili Dixon MD   25 mg at 01/03/25 2131    cloNIDine (Catapres) tablet 0.3 mg  0.3 mg oral BID Robby Chen MD   0.3 mg at 01/04/25 1020    diclofenac sodium (Voltaren) 1 % gel 4 g  4 g Topical 4x daily PRN Fili Dixon MD   4 g at 01/03/25 0217    diphenhydrAMINE (BENADryl) capsule 50 mg  50 mg oral q6h PRN Firas R Sen, DO   50 mg at 01/03/25 2131    doxazosin (Cardura) tablet 4 mg  4 mg oral Daily Firas R Sen, DO   4 mg at 01/03/25 1518    fluticasone (Flonase) nasal spray 2 spray  2 spray Each Nostril Daily Fili Dxion MD        fluticasone furoate-vilanteroL (Breo Ellipta) 100-25 mcg/dose inhaler 1 puff  1 puff inhalation Daily Fili Dixon MD   1 puff at 12/31/24 0843    gabapentin (Neurontin) capsule 300 mg  300 mg oral Daily Robby Chen MD   300 mg at 01/03/25 1145    hydrALAZINE (Apresoline) tablet 25 mg  25 mg oral q6h PRN Firas R Sen, DO   25 mg at 01/03/25 1205    ipratropium-albuteroL (Duo-Neb) 0.5-2.5 mg/3 mL nebulizer solution 3 mL  3 mL nebulization q6h PRN Fili Dixon MD        isosorbide mononitrate ER (Imdur) 24 hr tablet 60 mg  60 mg oral Daily Justin Sen DO        lidocaine (LMX) 4 % cream   Topical PRN Fili Dixon MD   Given at 01/03/25 3397    lidocaine 4 % patch 1 patch  1 patch transdermal Daily Fili Dixon MD   1 patch at 01/03/25 1119    metoclopramide (Reglan) tablet 5 mg  5 mg oral q6h Robby Chen MD   5 mg at  01/03/25 2347    NIFEdipine ER (Adalat CC) 24 hr tablet 90 mg  90 mg oral Nightly Fili Dixon MD   90 mg at 01/02/25 2030    ondansetron (Zofran) injection 4 mg  4 mg intravenous q8h PRN Fili Dixon MD   4 mg at 12/30/24 1817    oxygen (O2) therapy   inhalation Continuous - Inhalation Fili Dixon MD   2 L/min at 01/03/25 2132    perflutren lipid microspheres (Definity) injection 0.5-10 mL of dilution  0.5-10 mL of dilution intravenous Once in imaging Justin Sen DO        polyethylene glycol (Glycolax, Miralax) packet 17 g  17 g oral Daily Fili Dixon MD   17 g at 01/03/25 1118    pramoxine (Sarna Sensitive) 1 % lotion   Topical PRN Fili Dixon MD        sevelamer carbonate (Renvela) tablet 800 mg  800 mg oral TID Fili Dixon MD   800 mg at 01/03/25 1735    sodium chloride (Ocean) 0.65 % nasal spray 2 spray  2 spray Each Nostril 4x daily PRN Justin Sen DO        SUMAtriptan (Imitrex) tablet 25 mg  25 mg oral q2h PRN Fili Dixon MD   25 mg at 01/04/25 0712    torsemide (Demadex) tablet 20 mg  20 mg oral Every Mon/Wed/Fri Fili Dixon MD   20 mg at 01/03/25 1734    torsemide (Demadex) tablet 20 mg  20 mg oral 2 times per day on Sunday Tuesday Thursday Saturday Fili Dixon MD   20 mg at 01/02/25 2016    valsartan (Diovan) tablet 320 mg  320 mg oral q PM Fili Dixon MD   320 mg at 01/02/25 2031    vitamin B complex-vitamin C-folic acid (Nephrocaps) capsule 1 capsule  1 capsule oral Daily Fili Dixon MD   1 capsule at 01/03/25 1119    white petrolatum (Aquaphor) ointment   Topical BID Fili Dixon MD   Given at 01/03/25 1844       Images:  === 05/26/23 ===    US THORACENTESIS    - Impression -  Uneventful thoracentesis, as detailed above. Right pleural space, 300  mL    I personally performed and/or directly supervised this study and was  present for the entire  procedure.    Performed and dictated at [facility].        ASSESSMENT AND PLAN:  -Continue treatment per submitted orders        Mingo Barcenas MD  Senior Attending Physician  Director of Onco-Nephrology Program  Division of Nephrology & Hypertension  [facility]

## 2025-01-04 NOTE — NURSING NOTE
Report from Sending RN:    Report From: Lupe  Recent Surgery of Procedure: No  Baseline Level of Consciousness (LOC): a and o x 4   Oxygen Use: Yes  Type: 2-3 lpm  Diabetic: No  Last BP Med Given Day of Dialysis: Please do not give  Last Pain Med Given: 0145 Tylenol  Lab Tests to be Obtained with Dialysis: No  Blood Transfusion to be Given During Dialysis: No  Available IV Access: Yes  Medications to be Administered During Dialysis: No  Continuous IV Infusion Running: No,   Restraints on Currently or in the Last 24 Hours: No  Hand-Off Communication: Is able to stand for wt  Dialysis Catheter Dressing: AVF  Last Dressing Change: NA

## 2025-01-04 NOTE — PROGRESS NOTES
[facility]   HOSPITAL MEDICINE     PROGRESS NOTE       PATIENT NAME: Stacey Heath   MRN: [id number]   SERVICE DATE: 01/04/25   SERVICE TIME: 2:04 PM      Hospital Medicine/Primary Attending: Justin Sen DO   LENGTH OF STAY: 6     CHIEF COMPLAINT: Flash pulmonary edema     Assessment & Plan  Flash pulmonary edema    ESRD (end stage renal disease) on dialysis (Multi)        Assessment/Plan      I reviewed:    All new and relevant labs and imaging   New EKGs  Consultant notes   Vitals Signs   Nursing notes       54-year-old female initially admitted to MICU for hypertensive emergency complicated by flash pulmonary edema.  PMH ESRD on HD with multiple admissions for fluid overload and flash pulmonary edema    #Hypertensive emergency  #Uncontrolled hypertension  #Flash pulmonary edema  #HFpEF  #Fluid overload  #ESRD on HD  #Left upper extremity DVT on apixaban  #intract ROJAS   -Echo ordered yesterday.  Reviewed today.  Discussed with nephrology   -Cardiology consult  -Patient's blood pressure does improve when the patient is having dialysis. ?  Component of fluid overload and not removing enough fluid.  The issue has been that patient becomes symptomatic very quickly and cannot tolerate further ultrafiltration  -Continue apixaban for DVT  -carvedilol 25 mg BID, clonidine 0.3 mg BID, nifedipine ER 90 mg at bedtime, torsemide 20 mg on HD/BID on nHD days, valsartan 320 mg at bedtime, Imdur 60, doxazoyn.   -follow up cardio and renal recs appreciate help   -continue pain mgmt for HA. Seems to be better when BP is lower and on HD        I spent 55 minutes in professional medical decision making, face-to-face examination and counseling, care coordination, chart review, discussions with consultants, and care planning          DISPOSITION:  Functional Status Prior to Admit:     Medical Necessity for Continued Hospitalization      Plan of care discussed with:         Subjective    SUBJECTIVE:  Pt seen and examined.       Patient denies CP, SOB, fevers, chills, nausea, or emesis.         Objective      PHYSICAL EXAM: Patient Vitals for the past 24 hrs:   BP Temp Temp src Pulse Resp SpO2 Weight   01/04/25 1237 (!) 218/93 36.1 °C (97 °F) -- 82 16 94 % --   01/04/25 1140 -- 36.2 °C (97.2 °F) Skin 80 -- -- --   01/04/25 0721 -- 36.3 °C (97.3 °F) -- 70 -- -- --   01/04/25 0601 (!) 184/85 36.3 °C (97.3 °F) -- 70 17 92 % --   01/04/25 0557 -- -- -- -- -- -- 58.7 kg (129 lb 6.6 oz)   01/04/25 0128 172/76 36.4 °C (97.5 °F) -- 76 17 92 % --   01/03/25 2129 175/76 -- -- 79 -- -- --   01/03/25 2019 159/65 36.3 °C (97.3 °F) -- -- 17 -- --   01/03/25 1839 171/67 -- -- -- -- -- --   01/03/25 1741 117/72 36.6 °C (97.9 °F) -- 79 -- 95 % --   01/03/25 1658 175/78 36.5 °C (97.7 °F) -- -- -- -- --   01/03/25 1555 155/73 -- -- -- -- -- --      Physical Exam  Vitals and nursing note reviewed.   Constitutional:       General: She is not in acute distress.     Appearance: Normal appearance. She is not ill-appearing.   HENT:      Head: Normocephalic and atraumatic.   Pulmonary:      Effort: No respiratory distress.   Abdominal:      Tenderness: There is no guarding.   Skin:     Coloration: Skin is not jaundiced or pale.   Neurological:      Mental Status: She is alert and oriented to person, place, and time.   Psychiatric:         Mood and Affect: Mood normal.            MEDICATIONS:   Scheduled medications  acetaminophen, 975 mg, oral, q8h  ammonium lactate, , Topical, BID  apixaban, 5 mg, oral, BID  aspirin, 81 mg, oral, Daily  atorvastatin, 80 mg, oral, Nightly  carvedilol, 25 mg, oral, BID  cloNIDine, 0.3 mg, oral, BID  doxazosin, 4 mg, oral, Daily  fluticasone, 2 spray, Each Nostril, Daily  fluticasone furoate-vilanteroL, 1 puff, inhalation, Daily  gabapentin, 300 mg, oral, Daily  isosorbide mononitrate ER, 60 mg, oral, Daily  lidocaine, 1 patch, transdermal, Daily  metoclopramide, 5 mg, oral, q6h  NIFEdipine  "ER, 90 mg, oral, Nightly  oxygen, , inhalation, Continuous - Inhalation  perflutren lipid microspheres, 0.5-10 mL of dilution, intravenous, Once in imaging  polyethylene glycol, 17 g, oral, Daily  sevelamer carbonate, 800 mg, oral, TID  torsemide, 20 mg, oral, Every Mon/Wed/Fri  torsemide, 20 mg, oral, 2 times per day on Sunday Tuesday Thursday Saturday  valsartan, 320 mg, oral, q PM  vitamin B complex-vitamin C-folic acid, 1 capsule, oral, Daily  white petrolatum, , Topical, BID      Continuous medications     PRN medications  PRN medications: albuterol, diclofenac sodium, diphenhydrAMINE, hydrALAZINE, ipratropium-albuteroL, lidocaine, ondansetron, pramoxine, sodium chloride, SUMAtriptan       DATA:      Diagnostic tests reviewed for today's visit:     CBC:   Results from last 72 hours   Lab Units 01/04/25  0543   WBC AUTO x10*3/uL 4.6   HEMATOCRIT % 33.7*   PLATELETS AUTO x10*3/uL 175   MCV fL 94     Coags:     CMP:   Results from last 72 hours   Lab Units 01/04/25  0543   SODIUM mmol/L 133*   POTASSIUM mmol/L 5.9*   CO2 mmol/L 27   BUN mg/dL 82*   MAGNESIUM mg/dL 2.96*   ALBUMIN g/dL 3.6      Cardiac Enzymes: No lab exists for component: \"TROP\" .lab  Liver Function, Amylase, Lipase:       No lab exists for component: \"TPROT\", \"ALB\", \"TBILI\"   MG/PHOS: [id number](mg,p)@   Renal Panel:    Results from last 72 hours   Lab Units 01/04/25  0543   ALBUMIN g/dL 3.6   BUN mg/dL 82*   POTASSIUM mmol/L 5.9*   CO2 mmol/L 27   SODIUM mmol/L 133*      Heme:        No lab exists for component: \"RETICP\", \"ABSRETIC\", \"LD\", \"MOY\", \"FE\", \"TRANSFERSAT\"   No components found for: \"UALBCR\"      Medication and Non-Pharmacologic VTE Prophylaxis/Anticoagulants    The patient has received anticoagulants recently:  Heparin is ordered or has been given within the last 24 hours OR  Lovenox has been given in the last 24 hours OR  An anticoagulant other than Heparin or Lovenox is on the home med list OR  An anticoagulant other than Heparin " or Lovenox has been given within the last 5 days  Last Anticoag Admin            apixaban (Eliquis) tablet 5 mg    Given 5 mg at 1254    Frequency: 2 times daily         There are additional administrations since 01/01/25 1404 that are not shown.    No unadministered anticoagulant orders found.                   SIGNATURE: Justin Sen, [facility]    PAGER/CONTACT #: Epic Chat      Disclaimer: Portions of this note may have been generated using Dragon voice recognition software. Reasonable efforts were made to correct any dictation errors that resulted due to the programming of this software but some may still be present.     Portions of this note including HPI, ROS, impression/plan, and examination may have been copied forward from yesterday to January 4, 2025 as to provide important historical information essential in contributing to medical decision making. Documentation has been reviewed and edited as necessary to support clinical decision making for today's visit and to reflect my own independent evaluation of this patient.       The time of this note does not reflect the time I saw the patient but the time that this note was written

## 2025-01-04 NOTE — NURSING NOTE
Report to Receiving RN:    Report To: PONCHO Weathers  Time Report Called: 1140  Hand-Off Communication: Pt completed 4 hrs HD, 2L fluid removed, tolerated tx, /81, HR 78, pt stable, A/O.  Complications During Treatment: No  Ultrafiltration Treatment: Yes  Medications Administered During Dialysis: No  Blood Products Administered During Dialysis: No  Labs Sent During Dialysis: No  Heparin Drip Rate Changes: No  Dialysis Catheter Dressing: AVF  Last Dressing Change: AVF    Last Updated: 11:48 AM by SAKINA MOELLER

## 2025-01-05 ENCOUNTER — DOCUMENTATION (OUTPATIENT)
Dept: INPATIENT UNIT | Facility: HOSPITAL | Age: 55
End: 2025-01-05

## 2025-01-05 ENCOUNTER — APPOINTMENT (OUTPATIENT)
Dept: RADIOLOGY | Facility: HOSPITAL | Age: 55
DRG: 252 | End: 2025-01-05
Payer: COMMERCIAL

## 2025-01-05 ENCOUNTER — APPOINTMENT (OUTPATIENT)
Dept: CARDIOLOGY | Facility: HOSPITAL | Age: 55
DRG: 252 | End: 2025-01-05
Payer: COMMERCIAL

## 2025-01-05 VITALS
DIASTOLIC BLOOD PRESSURE: 66 MMHG | HEART RATE: 75 BPM | HEIGHT: 55 IN | RESPIRATION RATE: 14 BRPM | SYSTOLIC BLOOD PRESSURE: 213 MMHG | WEIGHT: 129.63 LBS | OXYGEN SATURATION: 100 % | BODY MASS INDEX: 30 KG/M2 | TEMPERATURE: 96.8 F

## 2025-01-05 LAB
ALBUMIN SERPL BCP-MCNC: 3.9 G/DL (ref 3.4–5)
ALBUMIN SERPL BCP-MCNC: 4.3 G/DL (ref 3.4–5)
ANION GAP SERPL CALC-SCNC: 18 MMOL/L (ref 10–20)
ANION GAP SERPL CALC-SCNC: 21 MMOL/L (ref 10–20)
APTT PPP: 38 SECONDS (ref 27–38)
BASOPHILS # BLD AUTO: 0.01 X10*3/UL (ref 0–0.1)
BASOPHILS # BLD AUTO: 0.01 X10*3/UL (ref 0–0.1)
BASOPHILS NFR BLD AUTO: 0.2 %
BASOPHILS NFR BLD AUTO: 0.2 %
BUN SERPL-MCNC: 49 MG/DL (ref 6–23)
BUN SERPL-MCNC: 62 MG/DL (ref 6–23)
CALCIUM SERPL-MCNC: 10.3 MG/DL (ref 8.6–10.6)
CALCIUM SERPL-MCNC: 10.5 MG/DL (ref 8.6–10.6)
CARDIAC TROPONIN I PNL SERPL HS: 42 NG/L (ref 0–34)
CHLORIDE SERPL-SCNC: 89 MMOL/L (ref 98–107)
CHLORIDE SERPL-SCNC: 90 MMOL/L (ref 98–107)
CO2 SERPL-SCNC: 24 MMOL/L (ref 21–32)
CO2 SERPL-SCNC: 27 MMOL/L (ref 21–32)
CREAT SERPL-MCNC: 6.16 MG/DL (ref 0.5–1.05)
CREAT SERPL-MCNC: 7.01 MG/DL (ref 0.5–1.05)
EGFRCR SERPLBLD CKD-EPI 2021: 7 ML/MIN/1.73M*2
EGFRCR SERPLBLD CKD-EPI 2021: 8 ML/MIN/1.73M*2
EOSINOPHIL # BLD AUTO: 0 X10*3/UL (ref 0–0.7)
EOSINOPHIL # BLD AUTO: 0.02 X10*3/UL (ref 0–0.7)
EOSINOPHIL NFR BLD AUTO: 0 %
EOSINOPHIL NFR BLD AUTO: 0.5 %
ERYTHROCYTE [DISTWIDTH] IN BLOOD BY AUTOMATED COUNT: 14.8 % (ref 11.5–14.5)
ERYTHROCYTE [DISTWIDTH] IN BLOOD BY AUTOMATED COUNT: 14.8 % (ref 11.5–14.5)
GLUCOSE BLD MANUAL STRIP-MCNC: 279 MG/DL (ref 74–99)
GLUCOSE BLD MANUAL STRIP-MCNC: 316 MG/DL (ref 74–99)
GLUCOSE BLD MANUAL STRIP-MCNC: 349 MG/DL (ref 74–99)
GLUCOSE SERPL-MCNC: 306 MG/DL (ref 74–99)
GLUCOSE SERPL-MCNC: 398 MG/DL (ref 74–99)
HCT VFR BLD AUTO: 34.3 % (ref 36–46)
HCT VFR BLD AUTO: 37.4 % (ref 36–46)
HGB BLD-MCNC: 11 G/DL (ref 12–16)
HGB BLD-MCNC: 11.8 G/DL (ref 12–16)
IMM GRANULOCYTES # BLD AUTO: 0.01 X10*3/UL (ref 0–0.7)
IMM GRANULOCYTES # BLD AUTO: 0.03 X10*3/UL (ref 0–0.7)
IMM GRANULOCYTES NFR BLD AUTO: 0.2 % (ref 0–0.9)
IMM GRANULOCYTES NFR BLD AUTO: 0.5 % (ref 0–0.9)
INR PPP: 1.3 (ref 0.9–1.1)
LACTATE SERPL-SCNC: 2.1 MMOL/L (ref 0.4–2)
LYMPHOCYTES # BLD AUTO: 0.51 X10*3/UL (ref 1.2–4.8)
LYMPHOCYTES # BLD AUTO: 0.53 X10*3/UL (ref 1.2–4.8)
LYMPHOCYTES NFR BLD AUTO: 12 %
LYMPHOCYTES NFR BLD AUTO: 9 %
MAGNESIUM SERPL-MCNC: 2.68 MG/DL (ref 1.6–2.4)
MAGNESIUM SERPL-MCNC: 2.71 MG/DL (ref 1.6–2.4)
MCH RBC QN AUTO: 28.6 PG (ref 26–34)
MCH RBC QN AUTO: 29.2 PG (ref 26–34)
MCHC RBC AUTO-ENTMCNC: 31.6 G/DL (ref 32–36)
MCHC RBC AUTO-ENTMCNC: 32.1 G/DL (ref 32–36)
MCV RBC AUTO: 89 FL (ref 80–100)
MCV RBC AUTO: 93 FL (ref 80–100)
MONOCYTES # BLD AUTO: 0.04 X10*3/UL (ref 0.1–1)
MONOCYTES # BLD AUTO: 0.16 X10*3/UL (ref 0.1–1)
MONOCYTES NFR BLD AUTO: 0.9 %
MONOCYTES NFR BLD AUTO: 2.7 %
NEUTROPHILS # BLD AUTO: 3.66 X10*3/UL (ref 1.2–7.7)
NEUTROPHILS # BLD AUTO: 5.19 X10*3/UL (ref 1.2–7.7)
NEUTROPHILS NFR BLD AUTO: 86.2 %
NEUTROPHILS NFR BLD AUTO: 87.6 %
NRBC BLD-RTO: 0 /100 WBCS (ref 0–0)
NRBC BLD-RTO: 0 /100 WBCS (ref 0–0)
PHOSPHATE SERPL-MCNC: 2.5 MG/DL (ref 2.5–4.9)
PHOSPHATE SERPL-MCNC: 3.7 MG/DL (ref 2.5–4.9)
PLATELET # BLD AUTO: 191 X10*3/UL (ref 150–450)
PLATELET # BLD AUTO: 207 X10*3/UL (ref 150–450)
POTASSIUM SERPL-SCNC: 5.2 MMOL/L (ref 3.5–5.3)
POTASSIUM SERPL-SCNC: 5.4 MMOL/L (ref 3.5–5.3)
PROTHROMBIN TIME: 14.8 SECONDS (ref 9.8–12.8)
RBC # BLD AUTO: 3.85 X10*6/UL (ref 4–5.2)
RBC # BLD AUTO: 4.04 X10*6/UL (ref 4–5.2)
SODIUM SERPL-SCNC: 129 MMOL/L (ref 136–145)
SODIUM SERPL-SCNC: 130 MMOL/L (ref 136–145)
WBC # BLD AUTO: 4.3 X10*3/UL (ref 4.4–11.3)
WBC # BLD AUTO: 5.9 X10*3/UL (ref 4.4–11.3)

## 2025-01-05 PROCEDURE — 36415 COLL VENOUS BLD VENIPUNCTURE: CPT

## 2025-01-05 PROCEDURE — 2500000001 HC RX 250 WO HCPCS SELF ADMINISTERED DRUGS (ALT 637 FOR MEDICARE OP)

## 2025-01-05 PROCEDURE — 80069 RENAL FUNCTION PANEL: CPT

## 2025-01-05 PROCEDURE — 2500000002 HC RX 250 W HCPCS SELF ADMINISTERED DRUGS (ALT 637 FOR MEDICARE OP, ALT 636 FOR OP/ED)

## 2025-01-05 PROCEDURE — 37799 UNLISTED PX VASCULAR SURGERY: CPT

## 2025-01-05 PROCEDURE — C9248 INJ, CLEVIDIPINE BUTYRATE: HCPCS

## 2025-01-05 PROCEDURE — 84484 ASSAY OF TROPONIN QUANT: CPT

## 2025-01-05 PROCEDURE — 2500000001 HC RX 250 WO HCPCS SELF ADMINISTERED DRUGS (ALT 637 FOR MEDICARE OP): Performed by: STUDENT IN AN ORGANIZED HEALTH CARE EDUCATION/TRAINING PROGRAM

## 2025-01-05 PROCEDURE — 93010 ELECTROCARDIOGRAM REPORT: CPT | Performed by: INTERNAL MEDICINE

## 2025-01-05 PROCEDURE — 85025 COMPLETE CBC W/AUTO DIFF WBC: CPT

## 2025-01-05 PROCEDURE — 85610 PROTHROMBIN TIME: CPT

## 2025-01-05 PROCEDURE — 2500000004 HC RX 250 GENERAL PHARMACY W/ HCPCS (ALT 636 FOR OP/ED)

## 2025-01-05 PROCEDURE — 85730 THROMBOPLASTIN TIME PARTIAL: CPT

## 2025-01-05 PROCEDURE — 99291 CRITICAL CARE FIRST HOUR: CPT

## 2025-01-05 PROCEDURE — 93005 ELECTROCARDIOGRAM TRACING: CPT

## 2025-01-05 PROCEDURE — 99233 SBSQ HOSP IP/OBS HIGH 50: CPT | Performed by: STUDENT IN AN ORGANIZED HEALTH CARE EDUCATION/TRAINING PROGRAM

## 2025-01-05 PROCEDURE — 71045 X-RAY EXAM CHEST 1 VIEW: CPT

## 2025-01-05 PROCEDURE — 2020000001 HC ICU ROOM DAILY

## 2025-01-05 PROCEDURE — 82947 ASSAY GLUCOSE BLOOD QUANT: CPT

## 2025-01-05 PROCEDURE — 83735 ASSAY OF MAGNESIUM: CPT

## 2025-01-05 PROCEDURE — 71045 X-RAY EXAM CHEST 1 VIEW: CPT | Performed by: RADIOLOGY

## 2025-01-05 PROCEDURE — 83605 ASSAY OF LACTIC ACID: CPT

## 2025-01-05 PROCEDURE — 2500000005 HC RX 250 GENERAL PHARMACY W/O HCPCS

## 2025-01-05 RX ORDER — INSULIN LISPRO 100 [IU]/ML
0-5 INJECTION, SOLUTION INTRAVENOUS; SUBCUTANEOUS
Status: DISPENSED | OUTPATIENT
Start: 2025-01-05

## 2025-01-05 RX ORDER — HYDRALAZINE HYDROCHLORIDE 100 MG/1
100 TABLET, FILM COATED ORAL 3 TIMES DAILY
Status: DISPENSED | OUTPATIENT
Start: 2025-01-05

## 2025-01-05 RX ORDER — HYDRALAZINE HYDROCHLORIDE 50 MG/1
50 TABLET, FILM COATED ORAL ONCE
Status: DISCONTINUED | OUTPATIENT
Start: 2025-01-05 | End: 2025-01-05

## 2025-01-05 RX ORDER — HYDRALAZINE HYDROCHLORIDE 50 MG/1
50 TABLET, FILM COATED ORAL ONCE
Status: COMPLETED | OUTPATIENT
Start: 2025-01-05 | End: 2025-01-05

## 2025-01-05 RX ADMIN — HYDRALAZINE HYDROCHLORIDE 50 MG: 25 TABLET ORAL at 14:10

## 2025-01-05 RX ADMIN — VALSARTAN 320 MG: 160 TABLET, FILM COATED ORAL at 20:42

## 2025-01-05 RX ADMIN — CLONIDINE HYDROCHLORIDE 0.3 MG: 0.1 TABLET ORAL at 21:01

## 2025-01-05 RX ADMIN — ASPIRIN 81 MG: 81 TABLET, COATED ORAL at 09:01

## 2025-01-05 RX ADMIN — DILTIAZEM HYDROCHLORIDE 180 MG: 180 CAPSULE, COATED, EXTENDED RELEASE ORAL at 09:00

## 2025-01-05 RX ADMIN — CLEVIPIDINE 2 MG/HR: 0.5 EMULSION INTRAVENOUS at 19:15

## 2025-01-05 RX ADMIN — ATORVASTATIN CALCIUM 80 MG: 80 TABLET, FILM COATED ORAL at 20:43

## 2025-01-05 RX ADMIN — PETROLATUM: 420 OINTMENT TOPICAL at 21:02

## 2025-01-05 RX ADMIN — GABAPENTIN 300 MG: 300 CAPSULE ORAL at 20:43

## 2025-01-05 RX ADMIN — INSULIN LISPRO 4 UNITS: 100 INJECTION, SOLUTION INTRAVENOUS; SUBCUTANEOUS at 17:49

## 2025-01-05 RX ADMIN — ASCORBIC ACID, THIAMINE MONONITRATE,RIBOFLAVIN, NIACINAMIDE, PYRIDOXINE HYDROCHLORIDE, FOLIC ACID, CYANOCOBALAMIN, BIOTIN, CALCIUM PANTOTHENATE, 1 CAPSULE: 100; 1.5; 1.7; 20; 10; 1; 6000; 150000; 5 CAPSULE, LIQUID FILLED ORAL at 09:00

## 2025-01-05 RX ADMIN — ACETAMINOPHEN 975 MG: 325 TABLET ORAL at 02:12

## 2025-01-05 RX ADMIN — CLONIDINE HYDROCHLORIDE 0.3 MG: 0.1 TABLET ORAL at 14:10

## 2025-01-05 RX ADMIN — CLEVIPIDINE 2 MG/HR: 0.5 EMULSION INTRAVENOUS at 11:00

## 2025-01-05 RX ADMIN — METOCLOPRAMIDE 5 MG: 10 TABLET ORAL at 21:01

## 2025-01-05 RX ADMIN — HYDRALAZINE HYDROCHLORIDE 100 MG: 100 TABLET ORAL at 21:01

## 2025-01-05 RX ADMIN — METOPROLOL SUCCINATE 100 MG: 100 TABLET, EXTENDED RELEASE ORAL at 09:01

## 2025-01-05 RX ADMIN — TORSEMIDE 20 MG: 20 TABLET ORAL at 09:01

## 2025-01-05 RX ADMIN — HYDRALAZINE HYDROCHLORIDE 50 MG: 50 TABLET ORAL at 17:48

## 2025-01-05 RX ADMIN — DEXTROSE MONOHYDRATE 500 MG: 50 INJECTION, SOLUTION INTRAVENOUS at 00:20

## 2025-01-05 RX ADMIN — HYDRALAZINE HYDROCHLORIDE 50 MG: 25 TABLET ORAL at 09:01

## 2025-01-05 RX ADMIN — ACETAMINOPHEN 975 MG: 325 TABLET ORAL at 16:34

## 2025-01-05 RX ADMIN — POLYETHYLENE GLYCOL 3350 17 G: 17 POWDER, FOR SOLUTION ORAL at 09:00

## 2025-01-05 RX ADMIN — METOCLOPRAMIDE 5 MG: 10 TABLET ORAL at 09:00

## 2025-01-05 RX ADMIN — Medication 1 APPLICATION: at 09:28

## 2025-01-05 RX ADMIN — APIXABAN 5 MG: 5 TABLET, FILM COATED ORAL at 21:01

## 2025-01-05 RX ADMIN — METOCLOPRAMIDE 5 MG: 10 TABLET ORAL at 14:10

## 2025-01-05 RX ADMIN — SEVELAMER CARBONATE 800 MG: 800 TABLET, FILM COATED ORAL at 09:00

## 2025-01-05 RX ADMIN — HYDRALAZINE HYDROCHLORIDE 25 MG: 25 TABLET ORAL at 05:52

## 2025-01-05 RX ADMIN — ACETAMINOPHEN 975 MG: 325 TABLET ORAL at 09:01

## 2025-01-05 RX ADMIN — METOCLOPRAMIDE 5 MG: 10 TABLET ORAL at 02:15

## 2025-01-05 RX ADMIN — PETROLATUM 1 APPLICATION: 420 OINTMENT TOPICAL at 09:33

## 2025-01-05 RX ADMIN — CLONIDINE HYDROCHLORIDE 0.3 MG: 0.1 TABLET ORAL at 05:52

## 2025-01-05 RX ADMIN — Medication: at 21:02

## 2025-01-05 RX ADMIN — TORSEMIDE 20 MG: 20 TABLET ORAL at 20:43

## 2025-01-05 RX ADMIN — SEVELAMER CARBONATE 800 MG: 800 TABLET, FILM COATED ORAL at 17:48

## 2025-01-05 RX ADMIN — APIXABAN 5 MG: 5 TABLET, FILM COATED ORAL at 09:01

## 2025-01-05 ASSESSMENT — ENCOUNTER SYMPTOMS
NUMBNESS: 0
APPETITE CHANGE: 0
MUSCULOSKELETAL NEGATIVE: 1
PSYCHIATRIC NEGATIVE: 1
FATIGUE: 1
GASTROINTESTINAL NEGATIVE: 1
LIGHT-HEADEDNESS: 0
CHILLS: 0
SHORTNESS OF BREATH: 0
DIAPHORESIS: 0
UNEXPECTED WEIGHT CHANGE: 0
RESPIRATORY NEGATIVE: 1
PALPITATIONS: 0
ACTIVITY CHANGE: 1
PHOTOPHOBIA: 0
FEVER: 0
HEADACHES: 1
CHEST TIGHTNESS: 0
DIZZINESS: 0
ENDOCRINE NEGATIVE: 1

## 2025-01-05 ASSESSMENT — PAIN DESCRIPTION - LOCATION
LOCATION: HEAD
LOCATION: HEAD

## 2025-01-05 ASSESSMENT — PAIN SCALES - GENERAL
PAINLEVEL_OUTOF10: 4
PAINLEVEL_OUTOF10: 5 - MODERATE PAIN
PAINLEVEL_OUTOF10: 4
PAINLEVEL_OUTOF10: 5 - MODERATE PAIN
PAINLEVEL_OUTOF10: 5 - MODERATE PAIN

## 2025-01-05 ASSESSMENT — PAIN - FUNCTIONAL ASSESSMENT
PAIN_FUNCTIONAL_ASSESSMENT: 0-10
PAIN_FUNCTIONAL_ASSESSMENT: 0-10

## 2025-01-05 ASSESSMENT — PAIN SCALES - WONG BAKER: WONGBAKER_NUMERICALRESPONSE: HURTS LITTLE MORE

## 2025-01-05 NOTE — NURSING NOTE
This nurse noted patient's BP upon assessment was 270/82. Team notified. Code white called. Decision was made transfer patient to CICU.

## 2025-01-05 NOTE — PROGRESS NOTES
Cardiology- Intensive Care unit - History and Physical   Subjective    Stacey Heath is a 54 y.o. year old female patient admitted on 2024 with following ICU needs: hypertensive emergency c/b pulmonary flash.     HPI:  Stacey Heath is a 54 y.o. female PMH significant for ESRD 2/2 on dialysis T/Th/Sat via [facility] AVF (last complete session 2025), HTN, HFpEF, COPD, brachial DVT on Eliquis who presented for [facility] ED for acute onset shortness of breath. Pt who was admitted to the MICU on 24 for hypertensive emergency c/b flash pulmonary edema. She was treated with cardene gtt and received UF and HD with nephrology. Got BP below 180 with cardene drip. cardene gtt was weaned off and patient resume home anti-HTN regimen. The patient was transferred to regular nursing floor and now admitted to CICU for hypertensive emergency with SBP 270s requiring Clevidipine drip.     Upon arrival to the CICU, pt was hypertensive to the 270s/82, started her on clevidipine and continued her home anti-hypertensive meds. She reports having severe headache and fatigue, but denies any vision changes, chest pain, SOB, palpitations or dizziness.     Recent Labs  CBC: 5.9/11.0/207  RFP: 130/5.4/90/27/62/7.01/306  M.68  Ca: 10.1  Phos: 2.5  Lactate: 2.1  Trop: 42   INR: 1.3     EKG: NSR with diffused TWI unchanged from previous EKGs.   CXR : No acute cardiopulmonary processes with unchanged findings of mild interstitial pulmonary edema.     TTE 2025  Impression:  1. The left ventricular systolic function is low normal, with a visually estimated ejection fraction of 50-55%.   2. Left ventricular diastolic filling cannot be determined, due to severe mitral annular calcification (MAC).   3. There is severely increased septal thickness.   4. There is severely increased concentric left ventricular hypertrophy.   5. There is mildly reduced right ventricular systolic function.   6. The left atrium is severely dilated.    7. There is moderate to severe mitral annular calcification.   8. Mild to moderate mitral valve regurgitation.   9. Slightly elevated right ventricular systolic pressure.  10. Strain values are abnormal, which imply subclinical myocardial dysfunction.    Review of Systems:  Review of Systems   Constitutional:  Positive for activity change and fatigue. Negative for appetite change, chills, diaphoresis, fever and unexpected weight change.   HENT: Negative.     Eyes:  Negative for photophobia and visual disturbance.   Respiratory: Negative.  Negative for chest tightness and shortness of breath.    Cardiovascular:  Negative for chest pain, palpitations and leg swelling.   Gastrointestinal: Negative.    Endocrine: Negative.    Genitourinary: Negative.    Musculoskeletal: Negative.    Skin: Negative.    Neurological:  Positive for headaches. Negative for dizziness, light-headedness and numbness.   Psychiatric/Behavioral: Negative.            Meds    Home medications:  Current Outpatient Medications   Medication Instructions    acetaminophen (TYLENOL) 650 mg, oral, Every 6 hours PRN    albuterol (ProAir HFA) 90 mcg/actuation inhaler 2 puffs, inhalation, Every 4 hours PRN    albuterol 1.25 mg, nebulization, Every 6 hours PRN    aspirin 81 mg, oral, Daily    atorvastatin (LIPITOR) 80 mg, oral, Nightly    calcium acetate (PHOSLO) 1,334 mg, oral, 3 times daily (morning, midday, late afternoon)    carvedilol (COREG) 25 mg, oral, 2 times daily    cloNIDine (CATAPRES) 0.2 mg, oral, Nightly    diclofenac sodium (VOLTAREN) 4 g, Topical, 4 times daily PRN    docusate sodium (COLACE) 100 mg, oral, 2 times daily PRN    Eliquis 5 mg, oral, 2 times daily    epoetin joceline (PROCRIT) 10,000 Units, subcutaneous, 3 times weekly    fluticasone (Flonase) 50 mcg/actuation nasal spray 2 sprays, Each Nostril, Daily, Shake gently. Before first use, prime pump. After use, clean tip and replace cap.    fluticasone furoate-vilanteroL (Breo Ellipta)  100-25 mcg/dose inhaler 1 puff, inhalation, Daily RT    gabapentin (NEURONTIN) 300 mg, oral, Nightly    lidocaine 4 % patch 1 patch, transdermal, Daily, Remove & discard patch within 12 hours or as directed by MD.    NIFEdipine ER (ADALAT CC) 90 mg, oral, Nightly, Do not crush, chew, or split.    pantoprazole (PROTONIX) 40 mg, oral, As needed, Do not crush, chew, or split.    polyethylene glycol (Glycolax, Miralax) 17 gram/dose powder Take 17 g (1 scoop dissolved in liquid) by mouth once daily.    SUMAtriptan (IMITREX) 25 mg, oral, Once as needed, May repeat dose once in 2 hours if no relief.  Do not exceed 2 doses in 24 hours.    torsemide (Demadex) 20 mg tablet Take 1 tablet after dialysis on dialysis days and twice daily on non dialysis days    valsartan (DIOVAN) 320 mg, oral, Every evening    vitamin B complex-vitamin C-folic acid (Nephrocaps) 1 mg capsule 1 capsule, oral, Daily        Inpatient medications:  Scheduled medications  acetaminophen, 975 mg, oral, q8h  ammonium lactate, , Topical, BID  apixaban, 5 mg, oral, BID  aspirin, 81 mg, oral, Daily  atorvastatin, 80 mg, oral, Nightly  cloNIDine, 0.3 mg, oral, q8h BELEN  dilTIAZem CD, 180 mg, oral, Daily  fluticasone, 2 spray, Each Nostril, Daily  fluticasone furoate-vilanteroL, 1 puff, inhalation, Daily  gabapentin, 300 mg, oral, Nightly  hydrALAZINE, 50 mg, oral, TID  hydrALAZINE, 50 mg, oral, Once  lidocaine, 1 patch, transdermal, Daily  metoclopramide, 5 mg, oral, q6h  metoprolol succinate XL, 100 mg, oral, Daily  oxygen, , inhalation, Continuous - Inhalation  perflutren lipid microspheres, 0.5-10 mL of dilution, intravenous, Once in imaging  polyethylene glycol, 17 g, oral, Daily  sevelamer carbonate, 800 mg, oral, TID  torsemide, 20 mg, oral, Every Mon/Wed/Fri  torsemide, 20 mg, oral, 2 times per day on Sunday Tuesday Thursday Saturday  valsartan, 320 mg, oral, q PM  vitamin B complex-vitamin C-folic acid, 1 capsule, oral, Daily  white petrolatum, ,  "Topical, BID      Continuous medications  clevidipine, 1-16 mg/hr, Last Rate: 3 mg/hr (01/05/25 1335)      PRN medications  PRN medications: albuterol, diclofenac sodium, diphenhydrAMINE, hydrALAZINE, ipratropium-albuteroL, lidocaine, ondansetron, pramoxine, sodium chloride, SUMAtriptan     Objective    Blood pressure (!) 213/66, pulse 74, temperature 35.3 °C (95.5 °F), resp. rate 18, height 1.397 m (4' 7\"), weight 58.8 kg (129 lb 10.1 oz), SpO2 99%.     Physical Exam  Constitutional:       Appearance: Normal appearance.   HENT:      Head: Normocephalic and atraumatic.      Nose: Nose normal.      Mouth/Throat:      Mouth: Mucous membranes are moist.   Eyes:      Extraocular Movements: Extraocular movements intact.      Pupils: Pupils are equal, round, and reactive to light.   Cardiovascular:      Rate and Rhythm: Regular rhythm.      Pulses: Normal pulses.      Heart sounds: Murmur heard.      Comments: Grade 4/6 systolic murmur over the aortic and pulmonic valve.   Pulmonary:      Effort: Pulmonary effort is normal.      Breath sounds: Normal breath sounds.   Abdominal:      General: Abdomen is flat. Bowel sounds are normal.   Neurological:      Mental Status: She is alert.          Intake/Output Summary (Last 24 hours) at 1/5/2025 1440  Last data filed at 1/5/2025 1300  Gross per 24 hour   Intake 108 ml   Output --   Net 108 ml     Labs:   Results from last 72 hours   Lab Units 01/05/25  1354 01/05/25  0542 01/04/25  0543   SODIUM mmol/L 130* 129* 133*   POTASSIUM mmol/L 5.4* 5.2 5.9*   CHLORIDE mmol/L 90* 89* 90*   CO2 mmol/L 27 24 27   BUN mg/dL 62* 49* 82*   CREATININE mg/dL 7.01* 6.16* 9.59*   GLUCOSE mg/dL 306* 398* 99   CALCIUM mg/dL 10.5 10.3 9.9   ANION GAP mmol/L 18 21* 22*   EGFR mL/min/1.73m*2 7* 8* 4*   PHOSPHORUS mg/dL 2.5 3.7 6.3*      Results from last 72 hours   Lab Units 01/05/25  1354 01/05/25  0542 01/04/25  0543   WBC AUTO x10*3/uL 5.9 4.3* 4.6   HEMOGLOBIN g/dL 11.0* 11.8* 10.5*   HEMATOCRIT " % 34.3* 37.4 33.7*   PLATELETS AUTO x10*3/uL 207 191 175   NEUTROS PCT AUTO % 87.6 86.2 46.8   LYMPHS PCT AUTO % 9.0 12.0 23.5   MONOS PCT AUTO % 2.7 0.9 11.6   EOS PCT AUTO % 0.0 0.5 16.4                 Micro/ID:     Lab Results   Component Value Date    URINECULTURE NO SIGNIFICANT GROWTH. 07/09/2023    BLOODCULT  08/21/2023     No Growth at 1 days~No Growth at 2 days~No Growth at 3 days~NO GROWTH at 4 days - FINAL REPORT       Summary of Key Imaging Results      Assessment and Plan     Assessment:  Stacey Heath is a 54 y.o. year old female patient admitted to the MICU on 01/05/25 for Hypertensive emergency requiring clevidipine drip. Continuing her home anti-hypertensive meds. Increased Hydralazine from 50mg TID to 100mg TID.     Mechanical Ventilation: none  Sedation/Analgesia:  none  Restraints: no    Plan:  NEUROLOGY/PSYCH:  Headache likely due to migraine vs Hypertensive emergency   BP was 270s/89 upon arrival to the CICU.   Non-contrast CT head 01/03: Shows no evidence of acute intracranial bleed.   Management:  Started patient on Clevidipine   Continue with tylenol for headache   Routine neuro checks in the settings of changes mental status     CARDIOVASCULAR:  Dx:Hypertensive Emergency    :: /80s now improved               :: TTE 01/03/2025 showed EF of 50-55 with left atrium severely dilated,                  Mod-severe mitral annular calcification. Severely increased concentric systolic function  Management:  Initiated Clevidipine and increased Hydral 50mg TID to 100mg TID.   SBP goal <180   Continue with   DX: Chronic deep vein thrombosis (DVT) of brachial vein of left upper extremity   Management:  Continue with Eliquis     PULMONARY:  Dx: COPD   Sating well on RA  Management:  Continue flonase spray   Continue Albuterol neb q6hr prn     RENAL/GENITOURINARY:  Dx: ESRD   On Dialysis T/Th/Sat  Management:  Scheduled for dialysis tomorrow for fluid removal   Nephrology following      GASTROENTEROLOGY:  Dx: Nausea   - Continue with scheduled Reglan and Zofran 4mg, prn     ENDOCRINOLOGY:  Dx: T2 DM  Recent Gluc is 306  Management:  ACHS  Stated pt on Lispro SS1     HEMATOLOGY:  Dx:  Acute on chronic anemia likely due to Anemia of chronic disease in the settings of ESRD.  Management:  Daily CBC     SKIN:  Dx: No active issues   Skin Failure No    MUSCULOSKELETAL:  Dx: ZAIN    INFECTIOUS DISEASE:  Dx: ZAIN      ICU Check List     FEN  Fluids: As needed   Electrolytes: Replete K>4 and Mag> 2  Nutrition: Regular diet   Prophylaxis:  DVT ppx: SCD and Eliquis   GI ppx: None  Bowel care: Miralax   Hardware:                   Social:  Code: Full Code    HPOA: Rohini Piña (Child)  [phone] (Mobile   Disposition: CIC                 Bam Dewitt MD   01/05/25 at 2:40 PM     Disclaimer: Documentation completed with the information available at the time of input. The times in the chart may not be reflective of actual patient care times, interventions, or procedures. Documentation occurs after the physical care of the patient.

## 2025-01-05 NOTE — CARE PLAN
The patient's goals for the shift include      The clinical goals for the shift include maintain B/P

## 2025-01-05 NOTE — SIGNIFICANT EVENT
Respiratory Therapy Rapid Response Note    Rapid response paged with concern for hypertension. No RT interventions at this time.

## 2025-01-05 NOTE — SIGNIFICANT EVENT
Rapid Response Nurse Note: Rapid Response    Pager time: [phone]  Arrival time: 850  Event end time:   Location: [place] [id number]  [] Triage by phone or secure messaging    Rapid response initiated by:  [] Rapid response RN [] Family [] Nursing Supervisor [] Physician   [] RADAR auto page [] Sepsis auto-page [x] RN [] RT   [] NP/PA [] Other:     Primary reason for call:   [] BAT [] New CPAP/BiPAP [] Bleeding [] Change in mental status   [] Chest pain [] Code blue [] FiO2 >/= 50% [] HR </= 40 bpm   [] HR >/= 130 bpm [] Hyperglycemia [] Hypoglycemia [] RADAR    [] RR </= 8 bpm [] RR >/= 30 bpm [] SBP </= 90 mmHg [] SpO2 < 90%   [] Seizure [] Sepsis [] Shortness of breath  [x] Staff concern: see comments     Providers present at bedside (if applicable): Dr. VINOD Sequeira ([facility])    Interventions:  [] None [] ABG/VBG [] Assist w/ICU transfer [] BAT paged    [] Bag mask [] Blood [] Cardioversion [] Code Blue   [] Code blue for intubation [] Code status changed [] Chest x-ray [] EKG   [] IV fluid/bolus [] KUB x-ray [] Labs/cultures [x] Medication   [] Nebulizer treatment [] NIPPV (CPAP/BiPAP) [] Oxygen [] Oral airway   [] Peripheral IV [] Palliative care consult [] CT/MRI [] Sepsis protocol    [] Suctioned [] Other:     Outcome:  [] Coded and  [] Code blue for intubation [] Coded and transferred to ICU []  on division   [] Remained on division (no change) [] Remained on division + additional monitoring [] Remained in ED [] Transferred to ED   [x] Transferred to ICU [] Transferred to inpatient status [] Transferred for interventions (procedure) [] Transferred to ICU stepdown    [] Transferred to surgery [] Transferred to telemetry [] Sepsis protocol [] STEMI protocol   [] Stroke protocol [x] Bedside nurse instructed to page rapid response for any concerns or acute change in condition/VS     Additional Comments: Patient with symptomatic hypertension.  Oral antihypertensives administered by bedside PONCHO Nuñez.  Patient  "continues to have a \"head ache\" with manual BP noted to be 272/82.  Patient interactive, but sluggish to respond to questions.  Patient with recent ICU stay for HTN.  CICU notified by Primary Team Dr. Chen.  Patient accepted to CICU.  RN report called and patient transferred to CICU #1.    "

## 2025-01-05 NOTE — PROGRESS NOTES
[facility]   HOSPITAL MEDICINE     PROGRESS NOTE       PATIENT NAME: Stacey Heath   MRN: [id number]   SERVICE DATE: 01/05/25   SERVICE TIME: 11:42 AM      Hospital Medicine/Primary Attending: Justin Sen DO   LENGTH OF STAY: 7     CHIEF COMPLAINT: Flash pulmonary edema     Assessment & Plan  Flash pulmonary edema    ESRD (end stage renal disease) on dialysis (Multi)        Assessment/Plan      I reviewed:    All new and relevant labs and imaging   New EKGs  Consultant notes   Vitals Signs   Nursing notes       54-year-old female initially admitted to MICU for hypertensive emergency complicated by flash pulmonary edema.  PMH ESRD on HD with multiple admissions for fluid overload and flash pulmonary edema    #Hypertensive emergency  #Uncontrolled hypertension  #Flash pulmonary edema  #HFpEF  #Fluid overload  #ESRD on HD  #Left upper extremity DVT on apixaban  #intract ROJAS   -Echo ordered yesterday.  Reviewed    -diastolic HF with LVH c/f obstructive process/blunting of outflow   -meds switched last night to decrease vasodilation unfortunately BP remains very elevated. Discussed with MICU and CICU fellows. Rapid called. Pt accepted to CICU. I think more appropriate to get cardiac care at this time. Discussed with pt's nephrologist Dr. Barraza who will folllow  -Patient's blood pressure does improve when the patient is having dialysis. ? Component of fluid overload and not removing enough fluid c/b HFpEF.  The issue has been that patient becomes symptomatic very quickly and cannot tolerate further ultrafiltration  -Continue apixaban for DVT       I spent 55 minutes in professional medical decision making, face-to-face examination and counseling, care coordination, chart review, discussions with consultants, and care planning          DISPOSITION:  Functional Status Prior to Admit:     Medical Necessity for Continued Hospitalization      Plan of care discussed with:          Subjective   SUBJECTIVE:  Pt seen and examined.       Patient denies CP, SOB, fevers, chills, nausea, or emesis.         Objective      PHYSICAL EXAM: Patient Vitals for the past 24 hrs:   BP Temp Pulse Resp SpO2   01/05/25 1100 (!) 213/66 -- -- -- --   01/05/25 1017 (!) 247/83 35.9 °C (96.6 °F) 90 20 100 %   01/05/25 0900 (!) 272/82 -- -- -- 100 %   01/05/25 0800 (!) 270/82 -- 84 16 100 %   01/05/25 0500 (!) 190/100 -- -- -- --   01/05/25 0410 (!) 202/106 36 °C (96.8 °F) 89 18 --   01/04/25 2300 175/90 36 °C (96.8 °F) 88 18 94 %   01/04/25 2147 -- -- -- -- 95 %   01/04/25 2100 (!) 186/90 -- -- -- 95 %   01/04/25 2011 (!) 143/100 36 °C (96.8 °F) 82 18 92 %   01/04/25 2011 -- -- -- -- 95 %   01/04/25 1611 (!) 225/94 36.7 °C (98.1 °F) 83 18 98 %   01/04/25 1237 (!) 218/93 36.1 °C (97 °F) 82 16 94 %      Physical Exam  Vitals and nursing note reviewed.   Constitutional:       General: She is not in acute distress.     Appearance: Normal appearance. She is not ill-appearing.   HENT:      Head: Normocephalic and atraumatic.   Pulmonary:      Effort: No respiratory distress.   Abdominal:      Tenderness: There is no guarding.   Skin:     Coloration: Skin is not jaundiced or pale.   Neurological:      Mental Status: She is alert and oriented to person, place, and time.   Psychiatric:         Mood and Affect: Mood normal.            MEDICATIONS:   Scheduled medications  [Transfer Hold] acetaminophen, 975 mg, oral, q8h  [Transfer Hold] ammonium lactate, , Topical, BID  [Transfer Hold] apixaban, 5 mg, oral, BID  [Transfer Hold] aspirin, 81 mg, oral, Daily  [Transfer Hold] atorvastatin, 80 mg, oral, Nightly  [Transfer Hold] cloNIDine, 0.3 mg, oral, q8h BELEN  [Transfer Hold] dilTIAZem CD, 180 mg, oral, Daily  [Transfer Hold] fluticasone, 2 spray, Each Nostril, Daily  [Transfer Hold] fluticasone furoate-vilanteroL, 1 puff, inhalation, Daily  [Transfer Hold] gabapentin, 300 mg, oral, Nightly  [Transfer Hold] hydrALAZINE, 50  "mg, oral, TID  [Transfer Hold] hydrALAZINE, 50 mg, oral, Once  [Transfer Hold] lidocaine, 1 patch, transdermal, Daily  [Transfer Hold] metoclopramide, 5 mg, oral, q6h  [Transfer Hold] metoprolol succinate XL, 100 mg, oral, Daily  [Transfer Hold] oxygen, , inhalation, Continuous - Inhalation  [Transfer Hold] perflutren lipid microspheres, 0.5-10 mL of dilution, intravenous, Once in imaging  [Transfer Hold] polyethylene glycol, 17 g, oral, Daily  [Transfer Hold] sevelamer carbonate, 800 mg, oral, TID  torsemide, 20 mg, oral, Every Mon/Wed/Fri  torsemide, 20 mg, oral, 2 times per day on Sunday Tuesday Thursday Saturday  [Transfer Hold] valsartan, 320 mg, oral, q PM  [Transfer Hold] vitamin B complex-vitamin C-folic acid, 1 capsule, oral, Daily  [Transfer Hold] white petrolatum, , Topical, BID      Continuous medications  clevidipine, 1-16 mg/hr, Last Rate: 2 mg/hr (01/05/25 1100)      PRN medications  PRN medications: [Transfer Hold] albuterol, [Transfer Hold] diclofenac sodium, [Transfer Hold] diphenhydrAMINE, [Transfer Hold] hydrALAZINE, [Transfer Hold] ipratropium-albuteroL, [Transfer Hold] lidocaine, [Transfer Hold] ondansetron, [Transfer Hold] pramoxine, [Transfer Hold] sodium chloride, [Transfer Hold] SUMAtriptan       DATA:      Diagnostic tests reviewed for today's visit:     CBC:   Results from last 72 hours   Lab Units 01/05/25  0542   WBC AUTO x10*3/uL 4.3*   HEMATOCRIT % 37.4   PLATELETS AUTO x10*3/uL 191   MCV fL 93     Coags:     CMP:   Results from last 72 hours   Lab Units 01/05/25  0542   SODIUM mmol/L 129*   POTASSIUM mmol/L 5.2   CO2 mmol/L 24   BUN mg/dL 49*   MAGNESIUM mg/dL 2.71*   ALBUMIN g/dL 4.3      Cardiac Enzymes: No lab exists for component: \"TROP\" .lab  Liver Function, Amylase, Lipase:       No lab exists for component: \"TPROT\", \"ALB\", \"TBILI\"   MG/PHOS: [id number](mg,p)@   Renal Panel:    Results from last 72 hours   Lab Units 01/05/25  0542   ALBUMIN g/dL 4.3   BUN mg/dL 49* " "  POTASSIUM mmol/L 5.2   CO2 mmol/L 24   SODIUM mmol/L 129*      Heme:        No lab exists for component: \"RETICP\", \"ABSRETIC\", \"LD\", \"MOY\", \"FE\", \"TRANSFERSAT\"   No components found for: \"UALBCR\"      Medication and Non-Pharmacologic VTE Prophylaxis/Anticoagulants    The patient has received anticoagulants recently:  Heparin is ordered or has been given within the last 24 hours OR  Lovenox has been given in the last 24 hours OR  An anticoagulant other than Heparin or Lovenox is on the home med list OR  An anticoagulant other than Heparin or Lovenox has been given within the last 5 days  Last Anticoag Admin            apixaban (Eliquis) tablet 5 mg    Given 5 mg at 1254    Frequency: 2 times daily         There are additional administrations since 01/01/25 1404 that are not shown.    No unadministered anticoagulant orders found.                   SIGNATURE: Justin Sen [facility]    PAGER/CONTACT #: [employer] [facility]      Disclaimer: Portions of this note may have been generated using Dragon voice recognition software. Reasonable efforts were made to correct any dictation errors that resulted due to the programming of this software but some may still be present.     Portions of this note including HPI, ROS, impression/plan, and examination may have been copied forward from yesterday to January 5, 2025 as to provide important historical information essential in contributing to medical decision making. Documentation has been reviewed and edited as necessary to support clinical decision making for today's visit and to reflect my own independent evaluation of this patient.       The time of this note does not reflect the time I saw the patient but the time that this note was written   "

## 2025-01-06 ENCOUNTER — HOME CARE VISIT (OUTPATIENT)
Dept: HOME HEALTH SERVICES | Facility: HOME HEALTH | Age: 55
End: 2025-01-06
Payer: COMMERCIAL

## 2025-01-06 ENCOUNTER — DOCUMENTATION (OUTPATIENT)
Dept: BEHAVIORAL HEALTH | Facility: CLINIC | Age: 55
End: 2025-01-06

## 2025-01-06 ENCOUNTER — APPOINTMENT (OUTPATIENT)
Dept: CARDIOLOGY | Facility: HOSPITAL | Age: 55
DRG: 252 | End: 2025-01-06
Payer: COMMERCIAL

## 2025-01-06 ENCOUNTER — APPOINTMENT (OUTPATIENT)
Dept: CARDIOLOGY | Facility: HOSPITAL | Age: 55
End: 2025-01-06
Payer: COMMERCIAL

## 2025-01-06 ENCOUNTER — APPOINTMENT (OUTPATIENT)
Dept: DIALYSIS | Facility: HOSPITAL | Age: 55
End: 2025-01-06
Payer: COMMERCIAL

## 2025-01-06 LAB
ALBUMIN SERPL BCP-MCNC: 3.4 G/DL (ref 3.4–5)
ALBUMIN SERPL BCP-MCNC: 3.6 G/DL (ref 3.4–5)
ALBUMIN SERPL BCP-MCNC: 3.6 G/DL (ref 3.4–5)
ANION GAP BLDA CALCULATED.4IONS-SCNC: 15 MMO/L (ref 10–25)
ANION GAP SERPL CALC-SCNC: 12 MMOL/L (ref 10–20)
ANION GAP SERPL CALC-SCNC: 22 MMOL/L (ref 10–20)
ANION GAP SERPL CALC-SCNC: 22 MMOL/L (ref 10–20)
ATRIAL RATE: 70 BPM
ATRIAL RATE: 90 BPM
BASE EXCESS BLDA CALC-SCNC: -3 MMOL/L (ref -2–3)
BODY TEMPERATURE: 37 DEGREES CELSIUS
BUN SERPL-MCNC: 27 MG/DL (ref 6–23)
BUN SERPL-MCNC: 85 MG/DL (ref 6–23)
BUN SERPL-MCNC: 87 MG/DL (ref 6–23)
CA-I BLDA-SCNC: 1.4 MMOL/L (ref 1.1–1.33)
CALCIUM SERPL-MCNC: 10.1 MG/DL (ref 8.6–10.6)
CALCIUM SERPL-MCNC: 10.3 MG/DL (ref 8.6–10.6)
CALCIUM SERPL-MCNC: 8.5 MG/DL (ref 8.6–10.6)
CHLORIDE BLDA-SCNC: 95 MMOL/L (ref 98–107)
CHLORIDE SERPL-SCNC: 91 MMOL/L (ref 98–107)
CHLORIDE SERPL-SCNC: 92 MMOL/L (ref 98–107)
CHLORIDE SERPL-SCNC: 96 MMOL/L (ref 98–107)
CO2 SERPL-SCNC: 23 MMOL/L (ref 21–32)
CO2 SERPL-SCNC: 25 MMOL/L (ref 21–32)
CO2 SERPL-SCNC: 30 MMOL/L (ref 21–32)
CREAT SERPL-MCNC: 3.4 MG/DL (ref 0.5–1.05)
CREAT SERPL-MCNC: 8.65 MG/DL (ref 0.5–1.05)
CREAT SERPL-MCNC: 8.73 MG/DL (ref 0.5–1.05)
EGFRCR SERPLBLD CKD-EPI 2021: 16 ML/MIN/1.73M*2
EGFRCR SERPLBLD CKD-EPI 2021: 5 ML/MIN/1.73M*2
EGFRCR SERPLBLD CKD-EPI 2021: 5 ML/MIN/1.73M*2
ERYTHROCYTE [DISTWIDTH] IN BLOOD BY AUTOMATED COUNT: 15.3 % (ref 11.5–14.5)
GLUCOSE BLD MANUAL STRIP-MCNC: 158 MG/DL (ref 74–99)
GLUCOSE BLD MANUAL STRIP-MCNC: 193 MG/DL (ref 74–99)
GLUCOSE BLD MANUAL STRIP-MCNC: 292 MG/DL (ref 74–99)
GLUCOSE BLD MANUAL STRIP-MCNC: 310 MG/DL (ref 74–99)
GLUCOSE BLDA-MCNC: 318 MG/DL (ref 74–99)
GLUCOSE SERPL-MCNC: 161 MG/DL (ref 74–99)
GLUCOSE SERPL-MCNC: 176 MG/DL (ref 74–99)
GLUCOSE SERPL-MCNC: 320 MG/DL (ref 74–99)
HCO3 BLDA-SCNC: 22.7 MMOL/L (ref 22–26)
HCT VFR BLD AUTO: 32.9 % (ref 36–46)
HCT VFR BLD EST: 32 % (ref 36–46)
HGB BLD-MCNC: 11 G/DL (ref 12–16)
HGB BLDA-MCNC: 10.7 G/DL (ref 12–16)
INHALED O2 CONCENTRATION: 21 %
LACTATE BLDA-SCNC: 2.1 MMOL/L (ref 0.4–2)
MAGNESIUM SERPL-MCNC: 2.96 MG/DL (ref 1.6–2.4)
MCH RBC QN AUTO: 29.6 PG (ref 26–34)
MCHC RBC AUTO-ENTMCNC: 33.4 G/DL (ref 32–36)
MCV RBC AUTO: 89 FL (ref 80–100)
NRBC BLD-RTO: 0 /100 WBCS (ref 0–0)
OXYHGB MFR BLDA: 93.7 % (ref 94–98)
P AXIS: 46 DEGREES
P AXIS: 55 DEGREES
P OFFSET: 185 MS
P OFFSET: 185 MS
P ONSET: 134 MS
P ONSET: 135 MS
PCO2 BLDA: 42 MM HG (ref 38–42)
PH BLDA: 7.34 PH (ref 7.38–7.42)
PHOSPHATE SERPL-MCNC: 3.1 MG/DL (ref 2.5–4.9)
PHOSPHATE SERPL-MCNC: 4.9 MG/DL (ref 2.5–4.9)
PHOSPHATE SERPL-MCNC: 5.2 MG/DL (ref 2.5–4.9)
PLATELET # BLD AUTO: 211 X10*3/UL (ref 150–450)
PO2 BLDA: 77 MM HG (ref 85–95)
POTASSIUM BLDA-SCNC: 5.3 MMOL/L (ref 3.5–5.3)
POTASSIUM SERPL-SCNC: 3.9 MMOL/L (ref 3.5–5.3)
POTASSIUM SERPL-SCNC: 5.5 MMOL/L (ref 3.5–5.3)
POTASSIUM SERPL-SCNC: 6.6 MMOL/L (ref 3.5–5.3)
PR INTERVAL: 164 MS
PR INTERVAL: 168 MS
Q ONSET: 217 MS
Q ONSET: 218 MS
QRS COUNT: 12 BEATS
QRS COUNT: 15 BEATS
QRS DURATION: 86 MS
QRS DURATION: 92 MS
QT INTERVAL: 364 MS
QT INTERVAL: 380 MS
QTC CALCULATION(BAZETT): 410 MS
QTC CALCULATION(BAZETT): 445 MS
QTC FREDERICIA: 400 MS
QTC FREDERICIA: 416 MS
R AXIS: 17 DEGREES
R AXIS: 24 DEGREES
RBC # BLD AUTO: 3.71 X10*6/UL (ref 4–5.2)
SAO2 % BLDA: 95 % (ref 94–100)
SODIUM BLDA-SCNC: 127 MMOL/L (ref 136–145)
SODIUM SERPL-SCNC: 130 MMOL/L (ref 136–145)
SODIUM SERPL-SCNC: 132 MMOL/L (ref 136–145)
SODIUM SERPL-SCNC: 134 MMOL/L (ref 136–145)
T AXIS: 176 DEGREES
T AXIS: 186 DEGREES
T OFFSET: 399 MS
T OFFSET: 408 MS
VENTRICULAR RATE: 70 BPM
VENTRICULAR RATE: 90 BPM
WBC # BLD AUTO: 10.4 X10*3/UL (ref 4.4–11.3)

## 2025-01-06 PROCEDURE — 2500000001 HC RX 250 WO HCPCS SELF ADMINISTERED DRUGS (ALT 637 FOR MEDICARE OP)

## 2025-01-06 PROCEDURE — 2500000005 HC RX 250 GENERAL PHARMACY W/O HCPCS

## 2025-01-06 PROCEDURE — 93005 ELECTROCARDIOGRAM TRACING: CPT

## 2025-01-06 PROCEDURE — 0932T N-INVS DET HRT FAIL AUG ECHO: CPT

## 2025-01-06 PROCEDURE — 2500000002 HC RX 250 W HCPCS SELF ADMINISTERED DRUGS (ALT 637 FOR MEDICARE OP, ALT 636 FOR OP/ED)

## 2025-01-06 PROCEDURE — 84132 ASSAY OF SERUM POTASSIUM: CPT

## 2025-01-06 PROCEDURE — 2500000004 HC RX 250 GENERAL PHARMACY W/ HCPCS (ALT 636 FOR OP/ED)

## 2025-01-06 PROCEDURE — 37799 UNLISTED PX VASCULAR SURGERY: CPT

## 2025-01-06 PROCEDURE — XXE2X19 MEASUREMENT OF CARDIAC OUTPUT, COMPUTER-AIDED ASSESSMENT, NEW TECHNOLOGY GROUP 9: ICD-10-PCS | Performed by: INTERNAL MEDICINE

## 2025-01-06 PROCEDURE — 99291 CRITICAL CARE FIRST HOUR: CPT

## 2025-01-06 PROCEDURE — 83735 ASSAY OF MAGNESIUM: CPT

## 2025-01-06 PROCEDURE — 99233 SBSQ HOSP IP/OBS HIGH 50: CPT

## 2025-01-06 PROCEDURE — 76937 US GUIDE VASCULAR ACCESS: CPT

## 2025-01-06 PROCEDURE — 80069 RENAL FUNCTION PANEL: CPT

## 2025-01-06 PROCEDURE — 2020000001 HC ICU ROOM DAILY

## 2025-01-06 PROCEDURE — 93010 ELECTROCARDIOGRAM REPORT: CPT | Performed by: INTERNAL MEDICINE

## 2025-01-06 PROCEDURE — 85027 COMPLETE CBC AUTOMATED: CPT

## 2025-01-06 PROCEDURE — C9248 INJ, CLEVIDIPINE BUTYRATE: HCPCS

## 2025-01-06 PROCEDURE — 82947 ASSAY GLUCOSE BLOOD QUANT: CPT

## 2025-01-06 PROCEDURE — 8010000001 HC DIALYSIS - HEMODIALYSIS PER DAY

## 2025-01-06 RX ORDER — DILTIAZEM HYDROCHLORIDE 240 MG/1
240 CAPSULE, COATED, EXTENDED RELEASE ORAL DAILY
Status: DISCONTINUED | OUTPATIENT
Start: 2025-01-07 | End: 2025-01-07

## 2025-01-06 RX ORDER — OXYCODONE HYDROCHLORIDE 5 MG/1
2.5 TABLET ORAL ONCE
Status: COMPLETED | OUTPATIENT
Start: 2025-01-06 | End: 2025-01-06

## 2025-01-06 RX ORDER — ALBUTEROL SULFATE 0.83 MG/ML
2.5 SOLUTION RESPIRATORY (INHALATION) ONCE
Status: DISCONTINUED | OUTPATIENT
Start: 2025-01-06 | End: 2025-01-06

## 2025-01-06 RX ORDER — DEXTROSE 50 % IN WATER (D50W) INTRAVENOUS SYRINGE
25 ONCE
Status: COMPLETED | OUTPATIENT
Start: 2025-01-06 | End: 2025-01-06

## 2025-01-06 RX ORDER — DEXTROSE MONOHYDRATE 100 MG/ML
50 INJECTION, SOLUTION INTRAVENOUS CONTINUOUS
Status: DISPENSED | OUTPATIENT
Start: 2025-01-06 | End: 2025-01-06

## 2025-01-06 RX ORDER — CARVEDILOL 12.5 MG/1
25 TABLET ORAL 2 TIMES DAILY
Status: DISCONTINUED | OUTPATIENT
Start: 2025-01-06 | End: 2025-01-27 | Stop reason: HOSPADM

## 2025-01-06 RX ORDER — OXYCODONE HYDROCHLORIDE 5 MG/1
5 TABLET ORAL ONCE
Status: COMPLETED | OUTPATIENT
Start: 2025-01-06 | End: 2025-01-06

## 2025-01-06 RX ORDER — CALCIUM GLUCONATE 20 MG/ML
2 INJECTION, SOLUTION INTRAVENOUS ONCE
Status: COMPLETED | OUTPATIENT
Start: 2025-01-06 | End: 2025-01-06

## 2025-01-06 RX ADMIN — METOPROLOL SUCCINATE 100 MG: 100 TABLET, EXTENDED RELEASE ORAL at 08:34

## 2025-01-06 RX ADMIN — HYDRALAZINE HYDROCHLORIDE 100 MG: 100 TABLET ORAL at 08:33

## 2025-01-06 RX ADMIN — ACETAMINOPHEN 975 MG: 325 TABLET, FILM COATED ORAL at 17:06

## 2025-01-06 RX ADMIN — INSULIN HUMAN 10 UNITS: 100 INJECTION, SOLUTION PARENTERAL at 08:49

## 2025-01-06 RX ADMIN — CARVEDILOL 25 MG: 12.5 TABLET, FILM COATED ORAL at 20:24

## 2025-01-06 RX ADMIN — VALSARTAN 320 MG: 160 TABLET, FILM COATED ORAL at 20:23

## 2025-01-06 RX ADMIN — DICLOFENAC SODIUM 4 G: 10 GEL TOPICAL at 08:38

## 2025-01-06 RX ADMIN — ACETAMINOPHEN 975 MG: 325 TABLET, FILM COATED ORAL at 10:22

## 2025-01-06 RX ADMIN — OXYCODONE 5 MG: 5 TABLET ORAL at 06:09

## 2025-01-06 RX ADMIN — OXYCODONE 2.5 MG: 5 TABLET ORAL at 17:06

## 2025-01-06 RX ADMIN — DIPHENHYDRAMINE HYDROCHLORIDE 50 MG: 25 CAPSULE ORAL at 20:30

## 2025-01-06 RX ADMIN — Medication 1 L/MIN: at 21:41

## 2025-01-06 RX ADMIN — HYDRALAZINE HYDROCHLORIDE 100 MG: 100 TABLET ORAL at 20:24

## 2025-01-06 RX ADMIN — SALINE NASAL SPRAY 2 SPRAY: 1.5 SOLUTION NASAL at 08:38

## 2025-01-06 RX ADMIN — Medication 1 L/MIN: at 01:10

## 2025-01-06 RX ADMIN — CLONIDINE HYDROCHLORIDE 0.3 MG: 0.1 TABLET ORAL at 05:55

## 2025-01-06 RX ADMIN — INSULIN LISPRO 1 UNITS: 100 INJECTION, SOLUTION INTRAVENOUS; SUBCUTANEOUS at 08:35

## 2025-01-06 RX ADMIN — SODIUM ZIRCONIUM CYCLOSILICATE 15 G: 10 POWDER, FOR SUSPENSION ORAL at 08:31

## 2025-01-06 RX ADMIN — CLEVIPIDINE 2 MG/HR: 0.5 EMULSION INTRAVENOUS at 22:46

## 2025-01-06 RX ADMIN — DICLOFENAC SODIUM 4 G: 10 GEL TOPICAL at 21:39

## 2025-01-06 RX ADMIN — CLEVIPIDINE 2 MG/HR: 0.5 EMULSION INTRAVENOUS at 20:26

## 2025-01-06 RX ADMIN — METOCLOPRAMIDE 5 MG: 10 TABLET ORAL at 20:22

## 2025-01-06 RX ADMIN — GABAPENTIN 300 MG: 300 CAPSULE ORAL at 20:24

## 2025-01-06 RX ADMIN — SUMATRIPTAN 25 MG: 25 TABLET, FILM COATED ORAL at 22:47

## 2025-01-06 RX ADMIN — ATORVASTATIN CALCIUM 80 MG: 80 TABLET, FILM COATED ORAL at 20:25

## 2025-01-06 RX ADMIN — OXYCODONE 5 MG: 5 TABLET ORAL at 21:39

## 2025-01-06 RX ADMIN — APIXABAN 5 MG: 5 TABLET, FILM COATED ORAL at 08:34

## 2025-01-06 RX ADMIN — LIDOCAINE 4%: 4 CREAM TOPICAL at 08:39

## 2025-01-06 RX ADMIN — SUMATRIPTAN 25 MG: 25 TABLET, FILM COATED ORAL at 16:11

## 2025-01-06 RX ADMIN — CLONIDINE HYDROCHLORIDE 0.3 MG: 0.1 TABLET ORAL at 21:43

## 2025-01-06 RX ADMIN — DILTIAZEM HYDROCHLORIDE 180 MG: 180 CAPSULE, COATED, EXTENDED RELEASE ORAL at 08:33

## 2025-01-06 RX ADMIN — PETROLATUM: 420 OINTMENT TOPICAL at 21:39

## 2025-01-06 RX ADMIN — CALCIUM GLUCONATE 2 G: 20 INJECTION, SOLUTION INTRAVENOUS at 08:49

## 2025-01-06 RX ADMIN — DEXTROSE MONOHYDRATE 25 G: 25 INJECTION, SOLUTION INTRAVENOUS at 08:50

## 2025-01-06 RX ADMIN — SEVELAMER CARBONATE 800 MG: 800 TABLET, FILM COATED ORAL at 08:34

## 2025-01-06 RX ADMIN — METOCLOPRAMIDE 5 MG: 10 TABLET ORAL at 01:54

## 2025-01-06 RX ADMIN — METOCLOPRAMIDE 5 MG: 10 TABLET ORAL at 08:34

## 2025-01-06 RX ADMIN — CARVEDILOL 25 MG: 12.5 TABLET, FILM COATED ORAL at 12:07

## 2025-01-06 RX ADMIN — APIXABAN 5 MG: 5 TABLET, FILM COATED ORAL at 20:23

## 2025-01-06 RX ADMIN — PETROLATUM: 420 OINTMENT TOPICAL at 08:39

## 2025-01-06 RX ADMIN — RENO CAPS 1 CAPSULE: 100; 1.5; 1.7; 20; 10; 1; 150; 5; 6 CAPSULE ORAL at 08:34

## 2025-01-06 RX ADMIN — SEVELAMER CARBONATE 800 MG: 800 TABLET, FILM COATED ORAL at 16:11

## 2025-01-06 RX ADMIN — SEVELAMER CARBONATE 800 MG: 800 TABLET, FILM COATED ORAL at 12:07

## 2025-01-06 RX ADMIN — ACETAMINOPHEN 975 MG: 325 TABLET, FILM COATED ORAL at 01:54

## 2025-01-06 RX ADMIN — INSULIN LISPRO 3 UNITS: 100 INJECTION, SOLUTION INTRAVENOUS; SUBCUTANEOUS at 12:07

## 2025-01-06 RX ADMIN — HYDRALAZINE HYDROCHLORIDE 100 MG: 100 TABLET ORAL at 16:08

## 2025-01-06 RX ADMIN — INSULIN LISPRO 1 UNITS: 100 INJECTION, SOLUTION INTRAVENOUS; SUBCUTANEOUS at 16:23

## 2025-01-06 RX ADMIN — ASPIRIN 81 MG: 81 TABLET, COATED ORAL at 08:34

## 2025-01-06 ASSESSMENT — PAIN SCALES - GENERAL
PAINLEVEL_OUTOF10: 6
PAINLEVEL_OUTOF10: 0 - NO PAIN
PAINLEVEL_OUTOF10: 2
PAINLEVEL_OUTOF10: 0 - NO PAIN
PAINLEVEL_OUTOF10: 9
PAINLEVEL_OUTOF10: 6
PAINLEVEL_OUTOF10: 7
PAINLEVEL_OUTOF10: 0 - NO PAIN
PAINLEVEL_OUTOF10: 2
PAINLEVEL_OUTOF10: 4

## 2025-01-06 ASSESSMENT — PAIN DESCRIPTION - LOCATION
LOCATION: HEAD
LOCATION: HEAD

## 2025-01-06 NOTE — NURSING NOTE
Patient was transferred to CICU with all belongings including tablet, cell phone in personal bag. Clothing and slippers at 1000.

## 2025-01-06 NOTE — PROGRESS NOTES
"Stacey Heath is a 54 y.o. female on day 8 of admission presenting with Hypertensive emergency.    Subjective   Patient seen and examine at the bedside; denies any chest pain, SOB, palpitation. Did report slurred speech which started yesterday. Will consider getting brain MRI when pt is stable. Scheduled for dialysis today.     Review of Systems   Review of Systems   10/10 negative except stated above      Objective     Last Recorded Vitals  Blood pressure (!) 213/66, pulse 69, temperature 35.7 °C (96.3 °F), temperature source Temporal, resp. rate 19, height 1.397 m (4' 7\"), weight 59 kg (130 lb 1.1 oz), SpO2 98%.  Intake/Output last 3 Shifts:  I/O last 3 completed shifts:  In: 169.4 (2.9 mL/kg) [I.V.:69.4 (1.2 mL/kg); IV Piggyback:100]  Out: - (0 mL/kg)   Weight: 59 kg     Physical Exam  Constitutional:       Appearance: Normal appearance.   HENT:      Head: Normocephalic and atraumatic.      Nose: Nose normal.      Mouth/Throat:      Mouth: Mucous membranes are moist.   Eyes:      Extraocular Movements: Extraocular movements intact.      Pupils: Pupils are equal, round, and reactive to light.   Cardiovascular:      Rate and Rhythm: Regular rhythm.      Pulses: Normal pulses.      Heart sounds: Murmur heard.      Comments: Grade 4/6 systolic murmur over the aortic and pulmonic valve.   Pulmonary:      Effort: Pulmonary effort is normal.      Breath sounds: Normal breath sounds.   Abdominal:      General: Abdomen is flat. Bowel sounds are normal.     Relevant Results    Labs    Results from last 7 days   Lab Units 01/06/25  0554 01/05/25  1354 01/05/25  0542   WBC AUTO x10*3/uL 10.4 5.9 4.3*   HEMOGLOBIN g/dL 11.0* 11.0* 11.8*   HEMATOCRIT % 32.9* 34.3* 37.4   PLATELETS AUTO x10*3/uL 211 207 191            Results from last 7 days   Lab Units 01/06/25  0554 01/05/25  1354 01/05/25  0542   SODIUM mmol/L 132* 130* 129*   POTASSIUM mmol/L 6.6* 5.4* 5.2   CHLORIDE mmol/L 92* 90* 89*   CO2 mmol/L 25 27 24   BUN mg/dL " "85* 62* 49*   CREATININE mg/dL 8.65* 7.01* 6.16*   CALCIUM mg/dL 10.1 10.5 10.3           No lab exists for component: \"LABALBU\"   Results from last 7 days   Lab Units 01/05/25  1354   APTT seconds 38   INR  1.3*        Medications  Scheduled medications  acetaminophen, 975 mg, oral, q8h  ammonium lactate, , Topical, BID  apixaban, 5 mg, oral, BID  aspirin, 81 mg, oral, Daily  atorvastatin, 80 mg, oral, Nightly  carvedilol, 25 mg, oral, BID  cloNIDine, 0.3 mg, oral, q8h BELEN  dilTIAZem CD, 180 mg, oral, Daily  fluticasone, 2 spray, Each Nostril, Daily  fluticasone furoate-vilanteroL, 1 puff, inhalation, Daily  gabapentin, 300 mg, oral, Nightly  hydrALAZINE, 100 mg, oral, TID  insulin lispro, 0-5 Units, subcutaneous, TID AC  lidocaine, 1 patch, transdermal, Daily  metoclopramide, 5 mg, oral, q6h  oxygen, , inhalation, Continuous - Inhalation  perflutren lipid microspheres, 0.5-10 mL of dilution, intravenous, Once in imaging  polyethylene glycol, 17 g, oral, Daily  sevelamer carbonate, 800 mg, oral, TID  torsemide, 20 mg, oral, Every Mon/Wed/Fri  torsemide, 20 mg, oral, 2 times per day on Sunday Tuesday Thursday Saturday  valsartan, 320 mg, oral, q PM  vitamin B complex-vitamin C-folic acid, 1 capsule, oral, Daily  white petrolatum, , Topical, BID      Continuous medications  clevidipine, 1-16 mg/hr, Last Rate: 1.5 mg/hr (01/06/25 1200)  dextrose 10 % in water (D10W), 50 mL/hr      PRN medications  PRN medications: albuterol, diclofenac sodium, diphenhydrAMINE, ipratropium-albuteroL, lidocaine, ondansetron, pramoxine, sodium chloride, SUMAtriptan     Imaging  XR chest 1 view   Final Result   1.  Hypoinflated lungs with findings suggestive of mild pulmonary   edema.                  MACRO:   None        Signed by: Adal Light 1/5/2025 4:38 PM   Dictation workstation:   [id number]      Transthoracic Echo (TTE) Limited   Final Result      Vascular US upper extremity venous duplex left   Final Result      CT head wo IV " contrast   Final Result   1. No acute intracranial abnormality.   2. Mild hypoattenuation in the bilateral periventricular white matter   and centrum semiovale is non-specific and likely relate to sequela   from chronic small vessel ischemic disease given patient's age.             I personally reviewed the images/study and I agree with the findings   as stated by Dr. Gee Goodman. This study was interpreted at [facility], [place], [place].        MACRO:   None        Signed by: Shabbir Romero 12/31/2024 7:13 AM   Dictation workstation:   [id number]      XR chest 1 view   Final Result   1. Unchanged findings of mild interstitial pulmonary edema        MACRO:   None.        Signed by: Deonna Hawkins 12/29/2024 7:23 PM   Dictation workstation:   [id number]               Assessment/Plan   Assessment & Plan  Hypertensive emergency    ESRD (end stage renal disease) on dialysis (Multi)    Flash pulmonary edema    Assessment:  Stacey Hetah is a 54 y.o. year old female patient admitted to the MICU on 01/05/25 for Hypertensive emergency requiring clevidipine drip. Continuing her home anti-hypertensive meds. Increased Hydralazine from 50mg TID to 100mg TID.      Today 01/06/2025:   Hyperkalemia of 6.6- Gave lokelma and calcium gluconate   SBP goal < 160  - Dialysis today   - Dc metop succinate and started patient on coreg 25mg BID     Plan:  NEUROLOGY/PSYCH:  Headache likely due to migraine vs Hypertensive emergency   BP was 270s/89 upon arrival to the CICU.   Non-contrast CT head 01/03: Shows no evidence of acute intracranial bleed.   Management:  Started patient on Clevidipine   Continue with tylenol for headache   Routine neuro checks in the settings of changes mental status   CARDIOVASCULAR:  Dx:Hypertensive Emergency   :: /80s now improved   :: TTE 01/03/2025 showed EF of 50-55 with left atrium severelydilated,        Mod-severe mitral annular calcification. Severely increased  concentric systolic function  Management:  Initiated Clevidipine and increased Hydral 50mg TID to 100mg TID.   SBP goal <180   Continue with   DX: Chronic deep vein thrombosis (DVT) of brachial vein of left upper extremity   Management:  Continue with Eliquis      PULMONARY:Dx: COPD   Sating well on RA    Management:  Continue flonase spray   Continue Albuterol neb q6hr prn      RENAL/GENITOURINARY:  Dx: ESRD   On Dialysis T/Th/Sat  Management:  Scheduled for dialysis tomorrow for fluid removal   Nephrology following   GASTROENTEROLOGY:  Dx: Nausea   - Continue with scheduled Reglan and Zofran 4mg, prn      ENDOCRINOLOGY:  Dx: T2 DM  Recent Gluc is 306  Management:  ACHS  Stated pt on Lispro SS1     HEMATOLOGY:  Dx: Acute on chronic anemia likely due to Anemia of chronic disease in the settings of ESRD.  Management:  Daily CBC      SKIN:  Dx: No active issues   Skin Failure No     MUSCULOSKELETAL:  Dx: ZAIN     INFECTIOUS DISEASE:  Dx: ZAIN        ICU Check List      FEN  Fluids: As needed   Electrolytes: Replete K>4 and Mag> 2  Nutrition: Regular diet   Prophylaxis:  DVT ppx: SCD and Eliquis   GI ppx: None  Bowel care: Miralax   Hardware:                        Social:  Code: Full Code    HPOA: Rohini Piña (Child)  [phone] (Mobile   Disposition: CICU       Case seen and discussed with attending Dr. Elam, to addend as necessary.    Bam Dewitt MD PGY-1

## 2025-01-06 NOTE — PROGRESS NOTES
Stacey Heath  Age: 54 y.o.  MRN: [id number]  Date: 1/6/2025  Location of service: phone call    Program Details  Medicaid Community Clinical Case Management  Status: Enrolled  Effective Dates: 10/17/2023 - present  Responsible Staff: NAREN Davidson      Goals Reviewed:      Summary:  This writer called patient to check in and to get patient scheduled with this writer. Patient states she is not feeling well because her fluid went up. Patient is currently in the ICU. Patient gets scheduled with this writer at this time for 1/15/25, however this writer will attempt to see patient inpatient this week.    Appointment start time: 1510  Appointment completion time: 1515  Total time spent with patient (in minutes): 5  Non-Billable Time: 5  Billable Time Total: 0    Roberta Gallego RN

## 2025-01-06 NOTE — CARE PLAN
Problem: Pain - Adult  Goal: Verbalizes/displays adequate comfort level or baseline comfort level  Outcome: Progressing     Problem: Safety - Adult  Goal: Free from fall injury  Outcome: Progressing     Problem: Discharge Planning  Goal: Discharge to home or other facility with appropriate resources  Outcome: Progressing     Problem: Chronic Conditions and Co-morbidities  Goal: Patient's chronic conditions and co-morbidity symptoms are monitored and maintained or improved  Outcome: Progressing     Problem: Skin  Goal: Decreased wound size/increased tissue granulation at next dressing change  Outcome: Progressing  Goal: Promote skin healing  Outcome: Progressing     Problem: Fall/Injury  Goal: Not fall by end of shift  Outcome: Progressing  Goal: Be free from injury by end of the shift  Outcome: Progressing  Goal: Verbalize understanding of personal risk factors for fall in the hospital  Outcome: Progressing  Goal: Verbalize understanding of risk factor reduction measures to prevent injury from fall in the home  Outcome: Progressing  Goal: Use assistive devices by end of the shift  Outcome: Progressing  Goal: Pace activities to prevent fatigue by end of the shift  Outcome: Progressing     Problem: Diabetes  Goal: Achieve decreasing blood glucose levels by end of shift  Outcome: Progressing  Goal: Increase stability of blood glucose readings by end of shift  Outcome: Progressing  Goal: Decrease in ketones present in urine by end of shift  Outcome: Progressing  Goal: Maintain electrolyte levels within acceptable range throughout shift  Outcome: Progressing  Goal: Maintain glucose levels >70mg/dl to <250mg/dl throughout shift  Outcome: Progressing  Goal: No changes in neurological exam by end of shift  Outcome: Progressing  Goal: Learn about and adhere to nutrition recommendations by end of shift  Outcome: Progressing  Goal: Vital signs within normal range for age by end of shift  Outcome: Progressing  Goal: Increase  self care and/or family involovement by end of shift  Outcome: Progressing  Goal: Receive DSME education by end of shift  Outcome: Progressing     Problem: Nutrition  Goal: Less than 5 days NPO/clear liquids  Outcome: Progressing  Goal: Oral intake greater than 50%  Outcome: Progressing  Goal: Oral intake greater 75%  Outcome: Progressing  Goal: Consume prescribed supplement  Outcome: Progressing  Goal: Adequate PO fluid intake  Outcome: Progressing  Goal: Nutrition support goals are met within 48 hrs  Outcome: Progressing  Goal: Nutrition support is meeting 75% of nutrient needs  Outcome: Progressing  Goal: Tube feed tolerance  Outcome: Progressing  Goal: BG  mg/dL  Outcome: Progressing  Goal: Lab values WNL  Outcome: Progressing  Goal: Electrolytes WNL  Outcome: Progressing  Goal: Promote healing  Outcome: Progressing  Goal: Maintain stable weight  Outcome: Progressing  Goal: Reduce weight from edema/fluid  Outcome: Progressing  Goal: Gradual weight gain  Outcome: Progressing  Goal: Improve ostomy output  Outcome: Progressing   The patient's goals for the shift include      The clinical goals for the shift include patient will remain HDS throughout shift and blood pressure will decrease

## 2025-01-06 NOTE — PROGRESS NOTES
Stacey HARRISON Heath   54 y.o.     MRN/Room: [id number]/01/01-A    Subjective:  [facility]    Seen on HD this morning  Doing ok    Objective:     Meds:   acetaminophen, 975 mg, q8h  ammonium lactate, , BID  apixaban, 5 mg, BID  aspirin, 81 mg, Daily  atorvastatin, 80 mg, Nightly  carvedilol, 25 mg, BID  cloNIDine, 0.3 mg, q8h BELEN  dilTIAZem CD, 180 mg, Daily  fluticasone, 2 spray, Daily  fluticasone furoate-vilanteroL, 1 puff, Daily  gabapentin, 300 mg, Nightly  hydrALAZINE, 100 mg, TID  insulin lispro, 0-5 Units, TID AC  lidocaine, 1 patch, Daily  metoclopramide, 5 mg, q6h  oxyCODONE, 2.5 mg, Once  oxygen, , Continuous - Inhalation  perflutren lipid microspheres, 0.5-10 mL of dilution, Once in imaging  polyethylene glycol, 17 g, Daily  sevelamer carbonate, 800 mg, TID  torsemide, 20 mg, Every Mon/Wed/Fri  torsemide, 20 mg, 2 times per day on Sunday Tuesday Thursday Saturday  valsartan, 320 mg, q PM  vitamin B complex-vitamin C-folic acid, 1 capsule, Daily  white petrolatum, , BID      clevidipine, Last Rate: Stopped (01/06/25 1340)      albuterol, 2.5 mg, q6h PRN  diclofenac sodium, 4 g, 4x daily PRN  diphenhydrAMINE, 50 mg, q6h PRN  ipratropium-albuteroL, 3 mL, q6h PRN  lidocaine, , PRN  ondansetron, 4 mg, q8h PRN  pramoxine, , PRN  sodium chloride, 2 spray, 4x daily PRN  SUMAtriptan, 25 mg, q2h PRN        Vitals:    01/06/25 1600   BP:    Pulse: 63   Resp: 14   Temp:    SpO2: 100%          Intake/Output Summary (Last 24 hours) at 1/6/2025 1647  Last data filed at 1/6/2025 1624  Gross per 24 hour   Intake 899.13 ml   Output --   Net 899.13 ml       General appearance: no distress  Eyes: non-icteric  Skin: no apparent rash  Heart: S1 S2 regular  Lungs: CTA bilat No wheezing/crackles  Abdomen: soft, nt/nd  Extremities: No edema bilat  Neuro: No FND,asterixis  Access: RUE AVF     Blood Labs:  Results for orders placed or performed during the hospital encounter of 12/29/24 (from the  past 24 hours)   POCT GLUCOSE   Result Value Ref Range    POCT Glucose 349 (H) 74 - 99 mg/dL   CBC   Result Value Ref Range    WBC 10.4 4.4 - 11.3 x10*3/uL    nRBC 0.0 0.0 - 0.0 /100 WBCs    RBC 3.71 (L) 4.00 - 5.20 x10*6/uL    Hemoglobin 11.0 (L) 12.0 - 16.0 g/dL    Hematocrit 32.9 (L) 36.0 - 46.0 %    MCV 89 80 - 100 fL    MCH 29.6 26.0 - 34.0 pg    MCHC 33.4 32.0 - 36.0 g/dL    RDW 15.3 (H) 11.5 - 14.5 %    Platelets 211 150 - 450 x10*3/uL   Renal function panel   Result Value Ref Range    Glucose 176 (H) 74 - 99 mg/dL    Sodium 132 (L) 136 - 145 mmol/L    Potassium 6.6 (HH) 3.5 - 5.3 mmol/L    Chloride 92 (L) 98 - 107 mmol/L    Bicarbonate 25 21 - 32 mmol/L    Anion Gap 22 (H) 10 - 20 mmol/L    Urea Nitrogen 85 (H) 6 - 23 mg/dL    Creatinine 8.65 (H) 0.50 - 1.05 mg/dL    eGFR 5 (L) >60 mL/min/1.73m*2    Calcium 10.1 8.6 - 10.6 mg/dL    Phosphorus 5.2 (H) 2.5 - 4.9 mg/dL    Albumin 3.6 3.4 - 5.0 g/dL   Magnesium   Result Value Ref Range    Magnesium 2.96 (H) 1.60 - 2.40 mg/dL   POCT GLUCOSE   Result Value Ref Range    POCT Glucose 193 (H) 74 - 99 mg/dL   Electrocardiogram, 12-lead PRN ACS symptoms   Result Value Ref Range    Ventricular Rate 70 BPM    Atrial Rate 70 BPM    WV Interval 168 ms    QRS Duration 86 ms    QT Interval 380 ms    QTC Calculation(Bazett) 410 ms    P Axis 46 degrees    R Axis 17 degrees    T Axis 176 degrees    QRS Count 12 beats    Q Onset 218 ms    P Onset 134 ms    P Offset 185 ms    T Offset 408 ms    QTC Fredericia 400 ms   BLOOD GAS ARTERIAL FULL PANEL   Result Value Ref Range    POCT pH, Arterial 7.34 (L) 7.38 - 7.42 pH    POCT pCO2, Arterial 42 38 - 42 mm Hg    POCT pO2, Arterial 77 (L) 85 - 95 mm Hg    POCT SO2, Arterial 95 94 - 100 %    POCT Oxy Hemoglobin, Arterial 93.7 (L) 94.0 - 98.0 %    POCT Hematocrit Calculated, Arterial 32.0 (L) 36.0 - 46.0 %    POCT Sodium, Arterial 127 (L) 136 - 145 mmol/L    POCT Potassium, Arterial 5.3 3.5 - 5.3 mmol/L    POCT Chloride, Arterial 95 (L)  98 - 107 mmol/L    POCT Ionized Calcium, Arterial 1.40 (H) 1.10 - 1.33 mmol/L    POCT Glucose, Arterial 318 (H) 74 - 99 mg/dL    POCT Lactate, Arterial 2.1 (H) 0.4 - 2.0 mmol/L    POCT Base Excess, Arterial -3.0 (L) -2.0 - 3.0 mmol/L    POCT HCO3 Calculated, Arterial 22.7 22.0 - 26.0 mmol/L    POCT Hemoglobin, Arterial 10.7 (L) 12.0 - 16.0 g/dL    POCT Anion Gap, Arterial 15 10 - 25 mmo/L    Patient Temperature 37.0 degrees Celsius    FiO2 21 %   POCT GLUCOSE   Result Value Ref Range    POCT Glucose 292 (H) 74 - 99 mg/dL   Renal Function Panel   Result Value Ref Range    Glucose 320 (H) 74 - 99 mg/dL    Sodium 130 (L) 136 - 145 mmol/L    Potassium 5.5 (H) 3.5 - 5.3 mmol/L    Chloride 91 (L) 98 - 107 mmol/L    Bicarbonate 23 21 - 32 mmol/L    Anion Gap 22 (H) 10 - 20 mmol/L    Urea Nitrogen 87 (H) 6 - 23 mg/dL    Creatinine 8.73 (H) 0.50 - 1.05 mg/dL    eGFR 5 (L) >60 mL/min/1.73m*2    Calcium 10.3 8.6 - 10.6 mg/dL    Phosphorus 4.9 2.5 - 4.9 mg/dL    Albumin 3.4 3.4 - 5.0 g/dL   Transthoracic Echo (TTE) Limited   Result Value Ref Range    AV pk malu 1.98 m/s    AV mn grad 7 mmHg    LVOT diam 1.80 cm    LV EF 58 %    Aortic Valve Area by Continuity of VTI 2.43 cm2    Aortic Valve Area by Continuity of Peak Velocity 2.13 cm2    AV pk grad 16 mmHg   POCT GLUCOSE   Result Value Ref Range    POCT Glucose 158 (H) 74 - 99 mg/dL     *Note: Due to a large number of results and/or encounters for the requested time period, some results have not been displayed. A complete set of results can be found in Results Review.     ASSESSMENT:  Stacey Heath is a  54 y.o. F with PMH ESRD on HD TTS (RUE AVF), HTN, HFpEF, COPD, upper extremity DVT who is currently admitted due to hypertensive emergency. Nephrology following to facilitate inpatient HD.     Ms Heath has a history of recurrent admissions for poorly controlled HTN and flash pulmonary edema.     #ESRD on HD TTS   -Primary nephrologist: Dr Barraza   -Access: ZOILA GUILLERMO    -Transferred to CICU 01/05  -Has been having issues with hypotension limiting volume removal with HD   -Last HD: 01/04   -Hypertension likely volume mediated in this ESRD patient     #HTN  -Current regimen:    Carvedilol 25mg po bid    Clonidine 0.3mg po q8h    Hydralazine 100mg po tid    Torsemide 20mg po once daily     RECOMMENDATIONS:  -iHD today as per submitted orders. Cardiology to perform ECHO with dialysis to assess for LVOT  -Check standing weight pre- and post HD  -UF tomorrow   -Will follow     SW Dr Pauline Floyd MD  Nephrology Fellow   Nephrology pager [phone]

## 2025-01-07 ENCOUNTER — DOCUMENTATION (OUTPATIENT)
Dept: BEHAVIORAL HEALTH | Facility: CLINIC | Age: 55
End: 2025-01-07

## 2025-01-07 ENCOUNTER — APPOINTMENT (OUTPATIENT)
Dept: DIALYSIS | Facility: HOSPITAL | Age: 55
End: 2025-01-07
Payer: COMMERCIAL

## 2025-01-07 LAB
ALBUMIN SERPL BCP-MCNC: 3.3 G/DL (ref 3.4–5)
ALBUMIN SERPL BCP-MCNC: 3.5 G/DL (ref 3.4–5)
ANION GAP SERPL CALC-SCNC: 12 MMOL/L (ref 10–20)
ANION GAP SERPL CALC-SCNC: 18 MMOL/L (ref 10–20)
AORTIC VALVE MEAN GRADIENT: 7 MMHG
AORTIC VALVE PEAK VELOCITY: 1.98 M/S
AV PEAK GRADIENT: 16 MMHG
AVA (PEAK VEL): 2.13 CM2
AVA (VTI): 2.43 CM2
BASOPHILS # BLD AUTO: 0.08 X10*3/UL (ref 0–0.1)
BASOPHILS NFR BLD AUTO: 1.1 %
BODY SURFACE AREA: 1.55 M2
BUN SERPL-MCNC: 19 MG/DL (ref 6–23)
BUN SERPL-MCNC: 43 MG/DL (ref 6–23)
CALCIUM SERPL-MCNC: 8.2 MG/DL (ref 8.6–10.6)
CALCIUM SERPL-MCNC: 9.5 MG/DL (ref 8.6–10.6)
CHLORIDE SERPL-SCNC: 95 MMOL/L (ref 98–107)
CHLORIDE SERPL-SCNC: 97 MMOL/L (ref 98–107)
CO2 SERPL-SCNC: 28 MMOL/L (ref 21–32)
CO2 SERPL-SCNC: 29 MMOL/L (ref 21–32)
CREAT SERPL-MCNC: 2.55 MG/DL (ref 0.5–1.05)
CREAT SERPL-MCNC: 4.77 MG/DL (ref 0.5–1.05)
EGFRCR SERPLBLD CKD-EPI 2021: 10 ML/MIN/1.73M*2
EGFRCR SERPLBLD CKD-EPI 2021: 22 ML/MIN/1.73M*2
EJECTION FRACTION: 58 %
EOSINOPHIL # BLD AUTO: 0.64 X10*3/UL (ref 0–0.7)
EOSINOPHIL NFR BLD AUTO: 8.6 %
ERYTHROCYTE [DISTWIDTH] IN BLOOD BY AUTOMATED COUNT: 15.3 % (ref 11.5–14.5)
GLUCOSE BLD MANUAL STRIP-MCNC: 100 MG/DL (ref 74–99)
GLUCOSE BLD MANUAL STRIP-MCNC: 230 MG/DL (ref 74–99)
GLUCOSE BLD MANUAL STRIP-MCNC: 330 MG/DL (ref 74–99)
GLUCOSE SERPL-MCNC: 138 MG/DL (ref 74–99)
GLUCOSE SERPL-MCNC: 177 MG/DL (ref 74–99)
HCT VFR BLD AUTO: 35.7 % (ref 36–46)
HGB BLD-MCNC: 11.2 G/DL (ref 12–16)
IMM GRANULOCYTES # BLD AUTO: 0.02 X10*3/UL (ref 0–0.7)
IMM GRANULOCYTES NFR BLD AUTO: 0.3 % (ref 0–0.9)
LEFT VENTRICULAR OUTFLOW TRACT DIAMETER: 1.8 CM
LYMPHOCYTES # BLD AUTO: 1.38 X10*3/UL (ref 1.2–4.8)
LYMPHOCYTES NFR BLD AUTO: 18.5 %
MAGNESIUM SERPL-MCNC: 2.29 MG/DL (ref 1.6–2.4)
MCH RBC QN AUTO: 28.4 PG (ref 26–34)
MCHC RBC AUTO-ENTMCNC: 31.4 G/DL (ref 32–36)
MCV RBC AUTO: 91 FL (ref 80–100)
MONOCYTES # BLD AUTO: 0.61 X10*3/UL (ref 0.1–1)
MONOCYTES NFR BLD AUTO: 8.2 %
NEUTROPHILS # BLD AUTO: 4.74 X10*3/UL (ref 1.2–7.7)
NEUTROPHILS NFR BLD AUTO: 63.3 %
NRBC BLD-RTO: 0 /100 WBCS (ref 0–0)
PHOSPHATE SERPL-MCNC: 3.4 MG/DL (ref 2.5–4.9)
PHOSPHATE SERPL-MCNC: 4.7 MG/DL (ref 2.5–4.9)
PLATELET # BLD AUTO: 215 X10*3/UL (ref 150–450)
POTASSIUM SERPL-SCNC: 4.3 MMOL/L (ref 3.5–5.3)
POTASSIUM SERPL-SCNC: 6 MMOL/L (ref 3.5–5.3)
RBC # BLD AUTO: 3.94 X10*6/UL (ref 4–5.2)
SODIUM SERPL-SCNC: 134 MMOL/L (ref 136–145)
SODIUM SERPL-SCNC: 135 MMOL/L (ref 136–145)
WBC # BLD AUTO: 7.5 X10*3/UL (ref 4.4–11.3)

## 2025-01-07 PROCEDURE — 2500000005 HC RX 250 GENERAL PHARMACY W/O HCPCS

## 2025-01-07 PROCEDURE — 83735 ASSAY OF MAGNESIUM: CPT | Performed by: INTERNAL MEDICINE

## 2025-01-07 PROCEDURE — 85025 COMPLETE CBC W/AUTO DIFF WBC: CPT | Performed by: INTERNAL MEDICINE

## 2025-01-07 PROCEDURE — C9248 INJ, CLEVIDIPINE BUTYRATE: HCPCS

## 2025-01-07 PROCEDURE — 2500000004 HC RX 250 GENERAL PHARMACY W/ HCPCS (ALT 636 FOR OP/ED)

## 2025-01-07 PROCEDURE — 2020000001 HC ICU ROOM DAILY

## 2025-01-07 PROCEDURE — 2500000001 HC RX 250 WO HCPCS SELF ADMINISTERED DRUGS (ALT 637 FOR MEDICARE OP)

## 2025-01-07 PROCEDURE — 80069 RENAL FUNCTION PANEL: CPT | Performed by: INTERNAL MEDICINE

## 2025-01-07 PROCEDURE — 2500000002 HC RX 250 W HCPCS SELF ADMINISTERED DRUGS (ALT 637 FOR MEDICARE OP, ALT 636 FOR OP/ED)

## 2025-01-07 PROCEDURE — 80069 RENAL FUNCTION PANEL: CPT

## 2025-01-07 PROCEDURE — 99291 CRITICAL CARE FIRST HOUR: CPT

## 2025-01-07 PROCEDURE — 94640 AIRWAY INHALATION TREATMENT: CPT

## 2025-01-07 PROCEDURE — 37799 UNLISTED PX VASCULAR SURGERY: CPT | Performed by: INTERNAL MEDICINE

## 2025-01-07 PROCEDURE — 37799 UNLISTED PX VASCULAR SURGERY: CPT

## 2025-01-07 PROCEDURE — 8010000001 HC DIALYSIS - HEMODIALYSIS PER DAY

## 2025-01-07 PROCEDURE — 82947 ASSAY GLUCOSE BLOOD QUANT: CPT

## 2025-01-07 RX ORDER — AMOXICILLIN 250 MG
1 CAPSULE ORAL NIGHTLY
Status: DISCONTINUED | OUTPATIENT
Start: 2025-01-07 | End: 2025-01-17

## 2025-01-07 RX ORDER — DILTIAZEM HYDROCHLORIDE 180 MG/1
360 CAPSULE, COATED, EXTENDED RELEASE ORAL DAILY
Status: DISCONTINUED | OUTPATIENT
Start: 2025-01-08 | End: 2025-01-27 | Stop reason: HOSPADM

## 2025-01-07 RX ORDER — CALCIUM GLUCONATE 20 MG/ML
2 INJECTION, SOLUTION INTRAVENOUS ONCE
Status: COMPLETED | OUTPATIENT
Start: 2025-01-07 | End: 2025-01-07

## 2025-01-07 RX ORDER — INSULIN LISPRO 100 [IU]/ML
0-10 INJECTION, SOLUTION INTRAVENOUS; SUBCUTANEOUS
Status: DISCONTINUED | OUTPATIENT
Start: 2025-01-07 | End: 2025-01-27 | Stop reason: HOSPADM

## 2025-01-07 RX ORDER — POLYETHYLENE GLYCOL 3350 17 G/17G
17 POWDER, FOR SOLUTION ORAL ONCE
Status: COMPLETED | OUTPATIENT
Start: 2025-01-07 | End: 2025-01-07

## 2025-01-07 RX ORDER — DEXTROSE MONOHYDRATE 100 MG/ML
50 INJECTION, SOLUTION INTRAVENOUS CONTINUOUS
Status: DISPENSED | OUTPATIENT
Start: 2025-01-07 | End: 2025-01-07

## 2025-01-07 RX ORDER — DEXTROSE 50 % IN WATER (D50W) INTRAVENOUS SYRINGE
25 ONCE
Status: COMPLETED | OUTPATIENT
Start: 2025-01-07 | End: 2025-01-07

## 2025-01-07 RX ORDER — DILTIAZEM HYDROCHLORIDE 120 MG/1
120 CAPSULE, COATED, EXTENDED RELEASE ORAL ONCE
Status: DISCONTINUED | OUTPATIENT
Start: 2025-01-07 | End: 2025-01-10

## 2025-01-07 RX ORDER — ALBUTEROL SULFATE 0.83 MG/ML
2.5 SOLUTION RESPIRATORY (INHALATION) ONCE
Status: COMPLETED | OUTPATIENT
Start: 2025-01-07 | End: 2025-01-07

## 2025-01-07 RX ADMIN — ALBUTEROL SULFATE 15 MG: 2.5 SOLUTION RESPIRATORY (INHALATION) at 09:10

## 2025-01-07 RX ADMIN — CARVEDILOL 25 MG: 12.5 TABLET, FILM COATED ORAL at 08:33

## 2025-01-07 RX ADMIN — SUMATRIPTAN 25 MG: 25 TABLET, FILM COATED ORAL at 22:06

## 2025-01-07 RX ADMIN — ACETAMINOPHEN 975 MG: 325 TABLET, FILM COATED ORAL at 09:11

## 2025-01-07 RX ADMIN — METOCLOPRAMIDE 5 MG: 10 TABLET ORAL at 08:30

## 2025-01-07 RX ADMIN — DILTIAZEM HYDROCHLORIDE 240 MG: 240 CAPSULE, EXTENDED RELEASE ORAL at 08:51

## 2025-01-07 RX ADMIN — APIXABAN 5 MG: 5 TABLET, FILM COATED ORAL at 08:32

## 2025-01-07 RX ADMIN — INSULIN LISPRO 2 UNITS: 100 INJECTION, SOLUTION INTRAVENOUS; SUBCUTANEOUS at 08:51

## 2025-01-07 RX ADMIN — DEXTROSE MONOHYDRATE 25 G: 25 INJECTION, SOLUTION INTRAVENOUS at 10:28

## 2025-01-07 RX ADMIN — APIXABAN 5 MG: 5 TABLET, FILM COATED ORAL at 20:08

## 2025-01-07 RX ADMIN — CLONIDINE HYDROCHLORIDE 0.3 MG: 0.1 TABLET ORAL at 21:04

## 2025-01-07 RX ADMIN — RENO CAPS 1 CAPSULE: 100; 1.5; 1.7; 20; 10; 1; 150; 5; 6 CAPSULE ORAL at 08:31

## 2025-01-07 RX ADMIN — ACETAMINOPHEN 975 MG: 325 TABLET, FILM COATED ORAL at 18:40

## 2025-01-07 RX ADMIN — INSULIN HUMAN 10 UNITS: 100 INJECTION, SOLUTION PARENTERAL at 10:27

## 2025-01-07 RX ADMIN — GABAPENTIN 300 MG: 300 CAPSULE ORAL at 20:08

## 2025-01-07 RX ADMIN — SODIUM ZIRCONIUM CYCLOSILICATE 10 G: 10 POWDER, FOR SUSPENSION ORAL at 22:55

## 2025-01-07 RX ADMIN — ATORVASTATIN CALCIUM 80 MG: 80 TABLET, FILM COATED ORAL at 20:08

## 2025-01-07 RX ADMIN — PETROLATUM: 420 OINTMENT TOPICAL at 20:10

## 2025-01-07 RX ADMIN — CLEVIPIDINE 1 MG/HR: 0.5 EMULSION INTRAVENOUS at 05:10

## 2025-01-07 RX ADMIN — Medication: at 08:52

## 2025-01-07 RX ADMIN — Medication 21 PERCENT: at 20:09

## 2025-01-07 RX ADMIN — DICLOFENAC SODIUM 4 G: 10 GEL TOPICAL at 20:08

## 2025-01-07 RX ADMIN — PETROLATUM: 420 OINTMENT TOPICAL at 08:54

## 2025-01-07 RX ADMIN — VALSARTAN 320 MG: 160 TABLET, FILM COATED ORAL at 20:08

## 2025-01-07 RX ADMIN — Medication: at 20:10

## 2025-01-07 RX ADMIN — DEXTROSE MONOHYDRATE 50 ML/HR: 100 INJECTION, SOLUTION INTRAVENOUS at 10:29

## 2025-01-07 RX ADMIN — SEVELAMER CARBONATE 800 MG: 800 TABLET, FILM COATED ORAL at 11:35

## 2025-01-07 RX ADMIN — CARVEDILOL 25 MG: 12.5 TABLET, FILM COATED ORAL at 20:08

## 2025-01-07 RX ADMIN — SODIUM ZIRCONIUM CYCLOSILICATE 10 G: 10 POWDER, FOR SUSPENSION ORAL at 08:35

## 2025-01-07 RX ADMIN — HYDRALAZINE HYDROCHLORIDE 100 MG: 100 TABLET ORAL at 08:33

## 2025-01-07 RX ADMIN — POLYETHYLENE GLYCOL 3350 17 G: 17 POWDER, FOR SOLUTION ORAL at 23:07

## 2025-01-07 RX ADMIN — DIPHENHYDRAMINE HYDROCHLORIDE 50 MG: 25 CAPSULE ORAL at 21:05

## 2025-01-07 RX ADMIN — TORSEMIDE 20 MG: 20 TABLET ORAL at 20:08

## 2025-01-07 RX ADMIN — METOCLOPRAMIDE 5 MG: 10 TABLET ORAL at 20:08

## 2025-01-07 RX ADMIN — POLYETHYLENE GLYCOL 3350 17 G: 17 POWDER, FOR SOLUTION ORAL at 08:37

## 2025-01-07 RX ADMIN — CALCIUM GLUCONATE 2 G: 20 INJECTION, SOLUTION INTRAVENOUS at 10:28

## 2025-01-07 RX ADMIN — TORSEMIDE 20 MG: 20 TABLET ORAL at 08:34

## 2025-01-07 RX ADMIN — INSULIN LISPRO 4 UNITS: 100 INJECTION, SOLUTION INTRAVENOUS; SUBCUTANEOUS at 11:33

## 2025-01-07 RX ADMIN — SEVELAMER CARBONATE 800 MG: 800 TABLET, FILM COATED ORAL at 08:33

## 2025-01-07 RX ADMIN — SENNOSIDES AND DOCUSATE SODIUM 1 TABLET: 50; 8.6 TABLET ORAL at 21:05

## 2025-01-07 RX ADMIN — Medication 21 PERCENT: at 08:53

## 2025-01-07 RX ADMIN — ASPIRIN 81 MG: 81 TABLET, COATED ORAL at 08:33

## 2025-01-07 RX ADMIN — HYDRALAZINE HYDROCHLORIDE 100 MG: 100 TABLET ORAL at 20:08

## 2025-01-07 RX ADMIN — CLONIDINE HYDROCHLORIDE 0.3 MG: 0.1 TABLET ORAL at 05:09

## 2025-01-07 RX ADMIN — LIDOCAINE 4%: 4 CREAM TOPICAL at 08:40

## 2025-01-07 ASSESSMENT — PAIN - FUNCTIONAL ASSESSMENT
PAIN_FUNCTIONAL_ASSESSMENT: 0-10

## 2025-01-07 ASSESSMENT — PAIN SCALES - GENERAL
PAINLEVEL_OUTOF10: 0 - NO PAIN
PAINLEVEL_OUTOF10: 3
PAINLEVEL_OUTOF10: 0 - NO PAIN
PAINLEVEL_OUTOF10: 0 - NO PAIN
PAINLEVEL_OUTOF10: 4
PAINLEVEL_OUTOF10: 0 - NO PAIN

## 2025-01-07 NOTE — PROGRESS NOTES
"Stacey Heath is a 54 y.o. female on day 9 of admission presenting with Hypertensive emergency.    Subjective   Patient seen and examine at the bedside; denies any chest pain, SOB, palpitation. Still feels like her speech is slurred speech otherwise does not have any acute neurological signs and symptoms.     Review of Systems   Review of Systems   10/10 negative except stated above      Objective     Last Recorded Vitals  Blood pressure (!) 213/66, pulse 55, temperature 35.9 °C (96.6 °F), resp. rate 12, height 1.397 m (4' 7\"), weight 61.8 kg (136 lb 3.9 oz), SpO2 99%.  Intake/Output last 3 Shifts:  I/O last 3 completed shifts:  In: 2170.4 (35.1 mL/kg) [P.O.:580; I.V.:1090.4 (17.6 mL/kg); Other:400; IV Piggyback:100]  Out: 2348 (38 mL/kg) [Other:2348]  Weight: 61.8 kg     Physical Exam  Constitutional:       Appearance: Normal appearance.   HENT:      Head: Normocephalic and atraumatic.      Nose: Nose normal.      Mouth/Throat:      Mouth: Mucous membranes are moist.   Eyes:      Extraocular Movements: Extraocular movements intact.      Pupils: Pupils are equal, round, and reactive to light.   Cardiovascular:      Rate and Rhythm: Regular rhythm.      Pulses: Normal pulses.      Heart sounds: Murmur heard.      Comments: Grade 4/6 systolic murmur over the aortic and pulmonic valve.   Pulmonary:      Effort: Pulmonary effort is normal.      Breath sounds: Normal breath sounds.   Abdominal:      General: Abdomen is flat. Bowel sounds are normal.     Relevant Results    Labs    Results from last 7 days   Lab Units 01/07/25  0411 01/06/25  0554 01/05/25  1354   WBC AUTO x10*3/uL 7.5 10.4 5.9   HEMOGLOBIN g/dL 11.2* 11.0* 11.0*   HEMATOCRIT % 35.7* 32.9* 34.3*   PLATELETS AUTO x10*3/uL 215 211 207            Results from last 7 days   Lab Units 01/07/25  0411 01/06/25  1816 01/06/25  1222   SODIUM mmol/L 135* 134* 130*   POTASSIUM mmol/L 6.0* 3.9 5.5*   CHLORIDE mmol/L 95* 96* 91*   CO2 mmol/L 28 30 23   BUN mg/dL 43* " "27* 87*   CREATININE mg/dL 4.77* 3.40* 8.73*   CALCIUM mg/dL 9.5 8.5* 10.3           No lab exists for component: \"LABALBU\"   Results from last 7 days   Lab Units 01/05/25  1354   APTT seconds 38   INR  1.3*        Medications  Scheduled medications  acetaminophen, 975 mg, oral, q8h  ammonium lactate, , Topical, BID  apixaban, 5 mg, oral, BID  aspirin, 81 mg, oral, Daily  atorvastatin, 80 mg, oral, Nightly  carvedilol, 25 mg, oral, BID  cloNIDine, 0.3 mg, oral, q8h BELEN  dilTIAZem CD, 120 mg, oral, Once  [START ON 1/8/2025] dilTIAZem CD, 360 mg, oral, Daily  fluticasone, 2 spray, Each Nostril, Daily  fluticasone furoate-vilanteroL, 1 puff, inhalation, Daily  gabapentin, 300 mg, oral, Nightly  hydrALAZINE, 100 mg, oral, TID  insulin lispro, 0-5 Units, subcutaneous, TID AC  lidocaine, 1 patch, transdermal, Daily  metoclopramide, 5 mg, oral, q6h  oxygen, , inhalation, Continuous - Inhalation  perflutren lipid microspheres, 0.5-10 mL of dilution, intravenous, Once in imaging  polyethylene glycol, 17 g, oral, Daily  sevelamer carbonate, 800 mg, oral, TID  sodium zirconium cyclosilicate, 10 g, oral, q8h  torsemide, 20 mg, oral, Every Mon/Wed/Fri  torsemide, 20 mg, oral, 2 times per day on Sunday Tuesday Thursday Saturday  valsartan, 320 mg, oral, q PM  vitamin B complex-vitamin C-folic acid, 1 capsule, oral, Daily  white petrolatum, , Topical, BID      Continuous medications  clevidipine, 1-16 mg/hr, Last Rate: Stopped (01/07/25 1100)  dextrose 10 % in water (D10W), 50 mL/hr, Last Rate: 50 mL/hr (01/07/25 1029)      PRN medications  PRN medications: albuterol, diclofenac sodium, diphenhydrAMINE, ipratropium-albuteroL, lidocaine, ondansetron, pramoxine, sodium chloride, SUMAtriptan     Imaging  Transthoracic Echo (TTE) Limited   Final Result      XR chest 1 view   Final Result   1.  Hypoinflated lungs with findings suggestive of mild pulmonary   edema.                  MACRO:   None        Signed by: Adal Light 1/5/2025 " 4:38 PM   Dictation workstation:   [id number]      Transthoracic Echo (TTE) Limited   Final Result      Vascular US upper extremity venous duplex left   Final Result      CT head wo IV contrast   Final Result   1. No acute intracranial abnormality.   2. Mild hypoattenuation in the bilateral periventricular white matter   and centrum semiovale is non-specific and likely relate to sequela   from chronic small vessel ischemic disease given patient's age.             I personally reviewed the images/study and I agree with the findings   as stated by Dr. Gee Goodman. This study was interpreted at [place], [place].        MACRO:   None        Signed by: Shabbir Romero 12/31/2024 7:13 AM   Dictation workstation:   [id number]      XR chest 1 view   Final Result   1. Unchanged findings of mild interstitial pulmonary edema        MACRO:   None.        Signed by: Deonna Hawkins 12/29/2024 7:23 PM   Dictation workstation:   [id number]               Assessment/Plan   Assessment & Plan  Hypertensive emergency    ESRD (end stage renal disease) on dialysis (Multi)    Flash pulmonary edema    Assessment:  Stacey Heath is a 54 y.o. year old female patient admitted to the MICU on 01/05/25 for Hypertensive emergency requiring clevidipine drip. Continuing her home anti-hypertensive meds. Increased Diltiazem to 360mg daily.      Today 01/06/2025:   - Ultrafiltration today   - SBP goal to wean pt off Clevidipine is <180       Plan:  NEUROLOGY/PSYCH:  Headache likely due to migraine vs Hypertensive emergency   BP was 270s/89 upon arrival to the CICU.   Non-contrast CT head 01/03: Shows no evidence of acute intracranial bleed.   Management:  Started patient on Clevidipine   Continue with tylenol for headache   Routine neuro checks in the settings of changes mental status   CARDIOVASCULAR:  Dx:Hypertensive Emergency   :: /80s now improved   :: TTE 01/03/2025 showed EF of 50-55 with left  atrium severelydilated,        Mod-severe mitral annular calcification. Severely increased concentric systolic function  Management:  Initiated Clevidipine and increased Hydral 50mg TID to 100mg TID.   SBP goal <180   Continue with   DX: Chronic deep vein thrombosis (DVT) of brachial vein of left upper extremity   Management:  Continue with Eliquis      PULMONARY:Dx: COPD   Sating well on RA    Management:  Continue flonase spray   Continue Albuterol neb q6hr prn      RENAL/GENITOURINARY:  Dx: ESRD   On Dialysis T/Th/Sat  Management:  Scheduled for dialysis tomorrow for fluid removal   Nephrology following   GASTROENTEROLOGY:  Dx: Nausea   - Continue with scheduled Reglan and Zofran 4mg, prn      ENDOCRINOLOGY:  Dx: T2 DM  Recent Gluc is 306  Management:  ACHS  Stated pt on Lispro SS1     HEMATOLOGY:  Dx: Acute on chronic anemia likely due to Anemia of chronic disease in the settings of ESRD.  Management:  Daily CBC      SKIN:  Dx: No active issues   Skin Failure No     MUSCULOSKELETAL:  Dx: ZAIN     INFECTIOUS DISEASE:  Dx: ZAIN        ICU Check List      FEN  Fluids: As needed   Electrolytes: Replete K>4 and Mag> 2  Nutrition: Regular diet   Prophylaxis:  DVT ppx: SCD and Eliquis   GI ppx: None  Bowel care: Miralax   Hardware:                        Social:  Code: Full Code    HPOA: Rohini Piña (Child)  [phone] (Mobile   Disposition: CICU       Case seen and discussed with attending Dr. Elam, to addend as necessary.    Bam Dewitt MD PGY-1

## 2025-01-07 NOTE — PROGRESS NOTES
Social Work Progress Note   - ICU TREATMENT PLAN: Patient was admitted to [facility] ICU for HTN emergency with flash pulmonary edema. Patient is on dialysis.  - Payer: [employer], [facility]  -Support System: SO, children  - Planned Disposition: Pending medical outcome and rehab recommendations.  - Barriers to discharge: None at this time. SW will continue to follow.  PORFIRIO SOMERS

## 2025-01-07 NOTE — ED PROCEDURE NOTE
Procedure  Critical Care    Performed by: Yvon Garcia MD  Authorized by: Yvon Garcia MD    Critical care provider statement:     Critical care time (minutes):  45    Critical care time was exclusive of:  Separately billable procedures and treating other patients and teaching time    Critical care was necessary to treat or prevent imminent or life-threatening deterioration of the following conditions:  Circulatory failure, renal failure and respiratory failure    Critical care was time spent personally by me on the following activities:  Blood draw for specimens, development of treatment plan with patient or surrogate, discussions with consultants, discussions with primary provider, examination of patient, ordering and performing treatments and interventions, ordering and review of laboratory studies, ordering and review of radiographic studies, pulse oximetry, re-evaluation of patient's condition and review of old charts    Care discussed with: admitting provider                 Yvon Garcia MD  01/07/25 0105

## 2025-01-08 ENCOUNTER — DOCUMENTATION (OUTPATIENT)
Dept: BEHAVIORAL HEALTH | Facility: CLINIC | Age: 55
End: 2025-01-08

## 2025-01-08 ENCOUNTER — APPOINTMENT (OUTPATIENT)
Dept: DIALYSIS | Facility: HOSPITAL | Age: 55
End: 2025-01-08
Payer: COMMERCIAL

## 2025-01-08 ENCOUNTER — APPOINTMENT (OUTPATIENT)
Dept: PULMONOLOGY | Facility: HOSPITAL | Age: 55
End: 2025-01-08
Payer: COMMERCIAL

## 2025-01-08 LAB
ALBUMIN SERPL BCP-MCNC: 3.1 G/DL (ref 3.4–5)
ALBUMIN SERPL BCP-MCNC: 3.2 G/DL (ref 3.4–5)
ANION GAP SERPL CALC-SCNC: 14 MMOL/L (ref 10–20)
ANION GAP SERPL CALC-SCNC: 18 MMOL/L (ref 10–20)
BASOPHILS # BLD AUTO: 0.07 X10*3/UL (ref 0–0.1)
BASOPHILS NFR BLD AUTO: 1.1 %
BUN SERPL-MCNC: 32 MG/DL (ref 6–23)
BUN SERPL-MCNC: 48 MG/DL (ref 6–23)
CALCIUM SERPL-MCNC: 9 MG/DL (ref 8.6–10.6)
CALCIUM SERPL-MCNC: 9.2 MG/DL (ref 8.6–10.6)
CHLORIDE SERPL-SCNC: 92 MMOL/L (ref 98–107)
CHLORIDE SERPL-SCNC: 94 MMOL/L (ref 98–107)
CO2 SERPL-SCNC: 27 MMOL/L (ref 21–32)
CO2 SERPL-SCNC: 31 MMOL/L (ref 21–32)
CREAT SERPL-MCNC: 3.67 MG/DL (ref 0.5–1.05)
CREAT SERPL-MCNC: 5.16 MG/DL (ref 0.5–1.05)
EGFRCR SERPLBLD CKD-EPI 2021: 14 ML/MIN/1.73M*2
EGFRCR SERPLBLD CKD-EPI 2021: 9 ML/MIN/1.73M*2
EOSINOPHIL # BLD AUTO: 0.92 X10*3/UL (ref 0–0.7)
EOSINOPHIL NFR BLD AUTO: 14.6 %
ERYTHROCYTE [DISTWIDTH] IN BLOOD BY AUTOMATED COUNT: 14.9 % (ref 11.5–14.5)
GLUCOSE BLD MANUAL STRIP-MCNC: 102 MG/DL (ref 74–99)
GLUCOSE BLD MANUAL STRIP-MCNC: 172 MG/DL (ref 74–99)
GLUCOSE BLD MANUAL STRIP-MCNC: 200 MG/DL (ref 74–99)
GLUCOSE SERPL-MCNC: 135 MG/DL (ref 74–99)
GLUCOSE SERPL-MCNC: 176 MG/DL (ref 74–99)
HCT VFR BLD AUTO: 34.8 % (ref 36–46)
HGB BLD-MCNC: 11.3 G/DL (ref 12–16)
IMM GRANULOCYTES # BLD AUTO: 0.04 X10*3/UL (ref 0–0.7)
IMM GRANULOCYTES NFR BLD AUTO: 0.6 % (ref 0–0.9)
LYMPHOCYTES # BLD AUTO: 1.08 X10*3/UL (ref 1.2–4.8)
LYMPHOCYTES NFR BLD AUTO: 17.1 %
MAGNESIUM SERPL-MCNC: 2.22 MG/DL (ref 1.6–2.4)
MCH RBC QN AUTO: 29 PG (ref 26–34)
MCHC RBC AUTO-ENTMCNC: 32.5 G/DL (ref 32–36)
MCV RBC AUTO: 89 FL (ref 80–100)
MONOCYTES # BLD AUTO: 0.75 X10*3/UL (ref 0.1–1)
MONOCYTES NFR BLD AUTO: 11.9 %
NEUTROPHILS # BLD AUTO: 3.45 X10*3/UL (ref 1.2–7.7)
NEUTROPHILS NFR BLD AUTO: 54.7 %
NRBC BLD-RTO: 0 /100 WBCS (ref 0–0)
PHOSPHATE SERPL-MCNC: 4 MG/DL (ref 2.5–4.9)
PHOSPHATE SERPL-MCNC: 5.4 MG/DL (ref 2.5–4.9)
PLATELET # BLD AUTO: 213 X10*3/UL (ref 150–450)
POTASSIUM SERPL-SCNC: 4.7 MMOL/L (ref 3.5–5.3)
POTASSIUM SERPL-SCNC: 5 MMOL/L (ref 3.5–5.3)
RBC # BLD AUTO: 3.9 X10*6/UL (ref 4–5.2)
SODIUM SERPL-SCNC: 132 MMOL/L (ref 136–145)
SODIUM SERPL-SCNC: 134 MMOL/L (ref 136–145)
WBC # BLD AUTO: 6.3 X10*3/UL (ref 4.4–11.3)

## 2025-01-08 PROCEDURE — 2500000001 HC RX 250 WO HCPCS SELF ADMINISTERED DRUGS (ALT 637 FOR MEDICARE OP)

## 2025-01-08 PROCEDURE — 37799 UNLISTED PX VASCULAR SURGERY: CPT | Performed by: INTERNAL MEDICINE

## 2025-01-08 PROCEDURE — 2500000002 HC RX 250 W HCPCS SELF ADMINISTERED DRUGS (ALT 637 FOR MEDICARE OP, ALT 636 FOR OP/ED)

## 2025-01-08 PROCEDURE — 8010000001 HC DIALYSIS - HEMODIALYSIS PER DAY

## 2025-01-08 PROCEDURE — 2500000005 HC RX 250 GENERAL PHARMACY W/O HCPCS

## 2025-01-08 PROCEDURE — 83735 ASSAY OF MAGNESIUM: CPT | Performed by: INTERNAL MEDICINE

## 2025-01-08 PROCEDURE — 99232 SBSQ HOSP IP/OBS MODERATE 35: CPT | Performed by: INTERNAL MEDICINE

## 2025-01-08 PROCEDURE — 82947 ASSAY GLUCOSE BLOOD QUANT: CPT

## 2025-01-08 PROCEDURE — 2500000004 HC RX 250 GENERAL PHARMACY W/ HCPCS (ALT 636 FOR OP/ED)

## 2025-01-08 PROCEDURE — C9248 INJ, CLEVIDIPINE BUTYRATE: HCPCS

## 2025-01-08 PROCEDURE — 37799 UNLISTED PX VASCULAR SURGERY: CPT

## 2025-01-08 PROCEDURE — 80069 RENAL FUNCTION PANEL: CPT | Performed by: INTERNAL MEDICINE

## 2025-01-08 PROCEDURE — 85025 COMPLETE CBC W/AUTO DIFF WBC: CPT | Performed by: INTERNAL MEDICINE

## 2025-01-08 PROCEDURE — 2020000001 HC ICU ROOM DAILY

## 2025-01-08 PROCEDURE — 80069 RENAL FUNCTION PANEL: CPT

## 2025-01-08 PROCEDURE — 99291 CRITICAL CARE FIRST HOUR: CPT

## 2025-01-08 RX ORDER — BISACODYL 10 MG/1
10 SUPPOSITORY RECTAL DAILY
Status: DISCONTINUED | OUTPATIENT
Start: 2025-01-08 | End: 2025-01-27 | Stop reason: HOSPADM

## 2025-01-08 RX ORDER — MINOXIDIL 2.5 MG/1
2.5 TABLET ORAL ONCE
Status: CANCELLED | OUTPATIENT
Start: 2025-01-08 | End: 2025-01-08

## 2025-01-08 RX ORDER — OXYCODONE HYDROCHLORIDE 5 MG/1
2.5 TABLET ORAL ONCE
Status: COMPLETED | OUTPATIENT
Start: 2025-01-08 | End: 2025-01-08

## 2025-01-08 RX ADMIN — PETROLATUM: 420 OINTMENT TOPICAL at 08:41

## 2025-01-08 RX ADMIN — ASPIRIN 81 MG: 81 TABLET, COATED ORAL at 08:39

## 2025-01-08 RX ADMIN — Medication 21 PERCENT: at 08:44

## 2025-01-08 RX ADMIN — TORSEMIDE 20 MG: 20 TABLET ORAL at 15:08

## 2025-01-08 RX ADMIN — GABAPENTIN 300 MG: 300 CAPSULE ORAL at 21:17

## 2025-01-08 RX ADMIN — POLYETHYLENE GLYCOL 3350 17 G: 17 POWDER, FOR SOLUTION ORAL at 08:44

## 2025-01-08 RX ADMIN — Medication: at 08:41

## 2025-01-08 RX ADMIN — SODIUM ZIRCONIUM CYCLOSILICATE 10 G: 10 POWDER, FOR SUSPENSION ORAL at 22:24

## 2025-01-08 RX ADMIN — DICLOFENAC SODIUM 4 G: 10 GEL TOPICAL at 21:18

## 2025-01-08 RX ADMIN — CARVEDILOL 25 MG: 12.5 TABLET, FILM COATED ORAL at 08:40

## 2025-01-08 RX ADMIN — APIXABAN 5 MG: 5 TABLET, FILM COATED ORAL at 21:17

## 2025-01-08 RX ADMIN — CLONIDINE HYDROCHLORIDE 0.3 MG: 0.1 TABLET ORAL at 21:18

## 2025-01-08 RX ADMIN — INSULIN LISPRO 2 UNITS: 100 INJECTION, SOLUTION INTRAVENOUS; SUBCUTANEOUS at 08:42

## 2025-01-08 RX ADMIN — DILTIAZEM HYDROCHLORIDE 360 MG: 180 CAPSULE, COATED, EXTENDED RELEASE ORAL at 08:41

## 2025-01-08 RX ADMIN — SODIUM ZIRCONIUM CYCLOSILICATE 10 G: 10 POWDER, FOR SUSPENSION ORAL at 08:46

## 2025-01-08 RX ADMIN — HYDRALAZINE HYDROCHLORIDE 100 MG: 100 TABLET ORAL at 08:39

## 2025-01-08 RX ADMIN — PETROLATUM: 420 OINTMENT TOPICAL at 21:28

## 2025-01-08 RX ADMIN — METOCLOPRAMIDE 5 MG: 10 TABLET ORAL at 08:47

## 2025-01-08 RX ADMIN — CLEVIPIDINE 3 MG/HR: 0.5 EMULSION INTRAVENOUS at 03:05

## 2025-01-08 RX ADMIN — ATORVASTATIN CALCIUM 80 MG: 80 TABLET, FILM COATED ORAL at 21:17

## 2025-01-08 RX ADMIN — METOCLOPRAMIDE 5 MG: 10 TABLET ORAL at 21:17

## 2025-01-08 RX ADMIN — HYDRALAZINE HYDROCHLORIDE 100 MG: 100 TABLET ORAL at 21:17

## 2025-01-08 RX ADMIN — BISACODYL 10 MG: 10 SUPPOSITORY RECTAL at 17:11

## 2025-01-08 RX ADMIN — RENO CAPS 1 CAPSULE: 100; 1.5; 1.7; 20; 10; 1; 150; 5; 6 CAPSULE ORAL at 08:40

## 2025-01-08 RX ADMIN — VALSARTAN 320 MG: 160 TABLET, FILM COATED ORAL at 21:17

## 2025-01-08 RX ADMIN — DIPHENHYDRAMINE HYDROCHLORIDE 50 MG: 25 CAPSULE ORAL at 21:26

## 2025-01-08 RX ADMIN — CARVEDILOL 25 MG: 12.5 TABLET, FILM COATED ORAL at 21:17

## 2025-01-08 RX ADMIN — OXYCODONE HYDROCHLORIDE 2.5 MG: 5 TABLET ORAL at 22:23

## 2025-01-08 RX ADMIN — ACETAMINOPHEN 975 MG: 325 TABLET, FILM COATED ORAL at 10:41

## 2025-01-08 RX ADMIN — CLONIDINE HYDROCHLORIDE 0.3 MG: 0.1 TABLET ORAL at 05:38

## 2025-01-08 RX ADMIN — Medication: at 21:28

## 2025-01-08 RX ADMIN — INSULIN LISPRO 2 UNITS: 100 INJECTION, SOLUTION INTRAVENOUS; SUBCUTANEOUS at 11:40

## 2025-01-08 RX ADMIN — SEVELAMER CARBONATE 800 MG: 800 TABLET, FILM COATED ORAL at 08:39

## 2025-01-08 RX ADMIN — SEVELAMER CARBONATE 800 MG: 800 TABLET, FILM COATED ORAL at 11:39

## 2025-01-08 RX ADMIN — APIXABAN 5 MG: 5 TABLET, FILM COATED ORAL at 08:40

## 2025-01-08 RX ADMIN — METOCLOPRAMIDE 5 MG: 10 TABLET ORAL at 15:08

## 2025-01-08 ASSESSMENT — PAIN SCALES - GENERAL
PAINLEVEL_OUTOF10: 0 - NO PAIN
PAINLEVEL_OUTOF10: 3
PAINLEVEL_OUTOF10: 0 - NO PAIN
PAINLEVEL_OUTOF10: 7
PAINLEVEL_OUTOF10: 0 - NO PAIN
PAINLEVEL_OUTOF10: 0 - NO PAIN

## 2025-01-08 ASSESSMENT — PAIN - FUNCTIONAL ASSESSMENT
PAIN_FUNCTIONAL_ASSESSMENT: 0-10

## 2025-01-08 NOTE — PROGRESS NOTES
"Stacey Heath is a 54 y.o. female on day 10 of admission presenting with Hypertensive emergency.    Subjective   Patient seen and examine at the bedside; denies any chest pain, SOB, palpitation, abdominal pain, n/v/d.     Review of Systems   Review of Systems   10/10 negative except stated above      Objective     Last Recorded Vitals  /56     Intake/Output last 3 Shifts:  I/O last 3 completed shifts:  In: 1765.1 (28.8 mL/kg) [P.O.:660; I.V.:705.1 (11.5 mL/kg); Other:400]  Out: 2023 (33.1 mL/kg) [Urine:100 (0 mL/kg/hr); Other:1923]  Weight: 61.2 kg     Physical Exam  Constitutional:       Appearance: Normal appearance.   HENT:      Head: Normocephalic and atraumatic.      Nose: Nose normal.      Mouth/Throat:      Mouth: Mucous membranes are moist.   Eyes:      Extraocular Movements: Extraocular movements intact.      Pupils: Pupils are equal, round, and reactive to light.   Cardiovascular:      Rate and Rhythm: Regular rhythm.      Pulses: Normal pulses.      Heart sounds: Murmur heard.      Comments: Grade 4/6 systolic murmur over the aortic and pulmonic valve.   Pulmonary:      Effort: Pulmonary effort is normal.      Breath sounds: Normal breath sounds.   Abdominal:      General: Abdomen is flat. Bowel sounds are normal.     Relevant Results    Labs    Results from last 7 days   Lab Units 01/08/25  0322 01/07/25 0411 01/06/25  0554   WBC AUTO x10*3/uL 6.3 7.5 10.4   HEMOGLOBIN g/dL 11.3* 11.2* 11.0*   HEMATOCRIT % 34.8* 35.7* 32.9*   PLATELETS AUTO x10*3/uL 213 215 211            Results from last 7 days   Lab Units 01/08/25  0322 01/07/25 2016 01/07/25  0411   SODIUM mmol/L 134* 134* 135*   POTASSIUM mmol/L 4.7 4.3 6.0*   CHLORIDE mmol/L 94* 97* 95*   CO2 mmol/L 31 29 28   BUN mg/dL 32* 19 43*   CREATININE mg/dL 3.67* 2.55* 4.77*   CALCIUM mg/dL 9.0 8.2* 9.5           No lab exists for component: \"LABALBU\"   Results from last 7 days   Lab Units 01/05/25  1354   APTT seconds 38   INR  1.3*    "     Medications  Scheduled medications  acetaminophen, 975 mg, oral, q8h  ammonium lactate, , Topical, BID  apixaban, 5 mg, oral, BID  aspirin, 81 mg, oral, Daily  atorvastatin, 80 mg, oral, Nightly  carvedilol, 25 mg, oral, BID  cloNIDine, 0.3 mg, oral, q8h BELEN  dilTIAZem CD, 120 mg, oral, Once  dilTIAZem CD, 360 mg, oral, Daily  fluticasone, 2 spray, Each Nostril, Daily  fluticasone furoate-vilanteroL, 1 puff, inhalation, Daily  gabapentin, 300 mg, oral, Nightly  hydrALAZINE, 100 mg, oral, TID  insulin lispro, 0-10 Units, subcutaneous, TID AC  lidocaine, 1 patch, transdermal, Daily  metoclopramide, 5 mg, oral, q6h  oxygen, , inhalation, Continuous - Inhalation  perflutren lipid microspheres, 0.5-10 mL of dilution, intravenous, Once in imaging  polyethylene glycol, 17 g, oral, Daily  sennosides-docusate sodium, 1 tablet, oral, Nightly  sevelamer carbonate, 800 mg, oral, TID  sodium zirconium cyclosilicate, 10 g, oral, q8h  torsemide, 20 mg, oral, Every Mon/Wed/Fri  torsemide, 20 mg, oral, 2 times per day on Sunday Tuesday Thursday Saturday  valsartan, 320 mg, oral, q PM  vitamin B complex-vitamin C-folic acid, 1 capsule, oral, Daily  white petrolatum, , Topical, BID      Continuous medications       PRN medications  PRN medications: albuterol, diclofenac sodium, diphenhydrAMINE, ipratropium-albuteroL, lidocaine, ondansetron, pramoxine, sodium chloride, SUMAtriptan     Imaging  Transthoracic Echo (TTE) Limited   Final Result      XR chest 1 view   Final Result   1.  Hypoinflated lungs with findings suggestive of mild pulmonary   edema.                  MACRO:   None        Signed by: Adal Light 1/5/2025 4:38 PM   Dictation workstation:   [id number]      Transthoracic Echo (TTE) Limited   Final Result      Vascular US upper extremity venous duplex left   Final Result      CT head wo IV contrast   Final Result   1. No acute intracranial abnormality.   2. Mild hypoattenuation in the bilateral periventricular white  matter   and centrum semiovale is non-specific and likely relate to sequela   from chronic small vessel ischemic disease given patient's age.             I personally reviewed the images/study and I agree with the findings   as stated by Dr. Gee Goodman. This study was interpreted at [facility], [place], [place].        MACRO:   None        Signed by: Shabbir Romero 12/31/2024 7:13 AM   Dictation workstation:   [id number]      XR chest 1 view   Final Result   1. Unchanged findings of mild interstitial pulmonary edema        MACRO:   None.        Signed by: Deonna Hawkins 12/29/2024 7:23 PM   Dictation workstation:   [id number]               Assessment/Plan   Assessment & Plan  Hypertensive emergency    ESRD (end stage renal disease) on dialysis (Multi)    Flash pulmonary edema    Assessment:  Stacey Heath is a 54 y.o. year old female patient admitted to the MICU on 01/05/25 for Hypertensive emergency requiring clevidipine drip. Continuing her home anti-hypertensive meds. Hold clonidine and hydralazine the morning of dialysis.     Today 01/0/2025:   - Ultrafiltration today   - SBP goal to wean pt off Clevidipine is <180   - Will continue to uptitrate BP meds     Plan:  NEUROLOGY/PSYCH:  Headache likely due to migraine vs Hypertensive emergency   BP was 270s/89 upon arrival to the CICU.   Non-contrast CT head 01/03: Shows no evidence of acute intracranial bleed.   Management:  Weaning pt of Clevidipine   Continue with tylenol for headache   Routine neuro checks in the settings of changes mental status   CARDIOVASCULAR:  Dx:Hypertensive Emergency   :: /80s now improved   :: TTE 01/03/2025 showed EF of 50-55 with left atrium severelydilated,        Mod-severe mitral annular calcification. Severely increased concentric systolic function  Management:  Will wean pt off Clevidipine   SBP goal <180   Continue with home antihypertensive meds   DX: Chronic deep vein thrombosis (DVT) of  brachial vein of left upper extremity   Management:  Continue with Eliquis      PULMONARY:Dx: COPD   Sating well on RA    Management:  Continue flonase spray   Continue Albuterol neb q6hr prn      RENAL/GENITOURINARY:  Dx: ESRD   On Dialysis T/Th/Sat  Management:  Scheduled for dialysis tomorrow for fluid removal   Nephrology following   GASTROENTEROLOGY:  Dx: Nausea   Dx: Constipation  Plan  - Continue with scheduled Reglan and Zofran 4mg, prn   - Continue with Miralax and started bisacodyl      ENDOCRINOLOGY:  Dx: T2 DM  Recent Gluc is 306  Management:  ACHS  Stated pt on Lispro SS1     HEMATOLOGY:  Dx: Acute on chronic anemia likely due to Anemia of chronic disease in the settings of ESRD.  Management:  Daily CBC      SKIN:  Dx: No active issues   Skin Failure No     MUSCULOSKELETAL:  Dx: ZAIN     INFECTIOUS DISEASE:  Dx: ZAIN        ICU Check List      FEN  Fluids: As needed   Electrolytes: Replete K>4 and Mag> 2  Nutrition: Regular diet   Prophylaxis:  DVT ppx: SCD and Eliquis   GI ppx: None  Bowel care: Miralax   Hardware:                        Social:  Code: Full Code    HPOA: Rohini Piña (Child)  [phone] (Mobile   Disposition: CICU       Case seen and discussed with attending Dr. Elam, to addend as necessary.    Bam Dewitt MD PGY-1

## 2025-01-08 NOTE — PROGRESS NOTES
"Stacey Heath  Age: 54 y.o.  MRN: [id number]  Date: 1/8/2025  Location of service: in community    Program Details  Medicaid Community Clinical Case Management  Status: Enrolled  Effective Dates: 10/17/2023 - present  Responsible Staff: NAREN Davidson      Goals Reviewed:  Problem: Access to Care Issue       Goal: Assess and Address Access Barriers       Priority: Medium        Problem: Anxiety       Goal: Maintain coping skills for continuous improvement       Priority: Medium        Problem: Financial Stressors       Goal: Assistance with financial concerns         Problem: Kidney Issues       Goal: Improve health to get on kidney transplant list       Priority: High        Problem: Medication Adherence       Goal: Adherence to Medication Regimen       Priority: High        Problem: Negative Experience, Conflict with, or Distrust of Providers and/or Health System       Goal: Plan to Address Patient Specific Negative Experience, Distrust, or Conflict with Providers and/or Health System       Priority: High        Problem: Risk of Uncoordinated Care       Goal: Care will be Coordinated and Supported by a Multidisciplinary Team of Providers       Priority: High          Summary:  This provider met with patient at [facility] where patient has been admitted since December 29th, 2024. This provider listened with empathy as patient talked about some feelings of being, patient stated \"irritated with people telling me what I can and can't eat\". This provider explained to patient as to why patient diet is important to patient's health plan. This provider was with patient when Dr. Barraza came into the patient's room to do an exam. This provider was able to introduce herself.                      NAREN Davidson   "

## 2025-01-08 NOTE — PROGRESS NOTES
"Nutrition Follow Up Assessment:   Nutrition Assessment       Patient is a 54 y.o. female admitted for hypertensive emergency complicated by flash pulmonary edema.  Continues on HD. Has been having hyperkalemia.     Nutrition History:  Food and Nutrient History: Met with pt this morning at bedside. She was complaining that her diet is restricted and she does not have many options to eat. Last meal that was documented in flowsheet was 100%. She had her tray at bedside this morning which 100% of it was consumed. She orders three meals/day. Mostly eating pizza and grapes for lunch + dinner.  Breakfast is usually scrambled eggs, mcfadden, grapes, oatmeal, and toast. She was requesting her potassium restricition be taken off. Explained that her labs have been high and that is why she is on the restriction and will see depending on the trend. She theodore receives Ensure Plus TID. She had a couple at her bedside. She would like to continue with just one/day.    Anthropometrics:  Height: 139.7 cm (4' 7\")   Weight: 61.2 kg (134 lb 14.7 oz)   BMI (Calculated): 31.36  IBW/kg (Dietitian Calculated): 40.9 kg  Percent of IBW: 150 %       Weight History:   Date/Time Weight   01/08/25 0000 61.2 kg (134 lb 14.7 oz)   01/07/25 0000 61.8 kg (136 lb 3.9 oz)   01/06/25 0000 59 kg (130 lb 1.1 oz)   01/05/25 1335 58.8 kg (129 lb 10.1 oz)   01/04/25 0557 58.7 kg (129 lb 6.6 oz)   01/02/25 0641 55.6 kg (122 lb 9.2 oz)   01/02/25 0600 55.6 kg (122 lb 9.2 oz)   12/29/24 2104 57.9 kg (127 lb 10.3 oz)   12/29/24 1821 55.3 kg (122 lb)   Admit Weight: 55.3 kg (122 lb)     Weight Change %:  Weight History / % Weight Change: Weight is up 5.9kg from admission.  Significant Weight Gain: Fluid related    Nutrition Focused Physical Exam Findings:  Subcutaneous Fat Loss:   Orbital Fat Pads: Well nourished (slightly bulging fat pads)  Buccal Fat Pads: Well nourished (full, rounded cheeks)  Muscle Wasting:  Temporalis: Well nourished (well-defined " muscle)  Pectoralis (Clavicular Region): Well nourished (clavicle not visible)  Deltoid/Trapezius: Well nourished (rounded appearance at arm, shoulder, neck)  Edema:  Edema: none  Physical Findings:  Skin:  (skin tear left upper back)    Nutrition Significant Labs:  CBC Trend:   Results from last 7 days   Lab Units 01/08/25 0322 01/07/25 0411 01/06/25  0554 01/05/25  1354   WBC AUTO x10*3/uL 6.3 7.5 10.4 5.9   RBC AUTO x10*6/uL 3.90* 3.94* 3.71* 3.85*   HEMOGLOBIN g/dL 11.3* 11.2* 11.0* 11.0*   HEMATOCRIT % 34.8* 35.7* 32.9* 34.3*   MCV fL 89 91 89 89   PLATELETS AUTO x10*3/uL 213 215 211 207    , BMP Trend:   Results from last 7 days   Lab Units 01/08/25 0322 01/07/25 2016 01/07/25 0411 01/06/25  1816   GLUCOSE mg/dL 135* 177* 138* 161*   CALCIUM mg/dL 9.0 8.2* 9.5 8.5*   SODIUM mmol/L 134* 134* 135* 134*   POTASSIUM mmol/L 4.7 4.3 6.0* 3.9   CO2 mmol/L 31 29 28 30   CHLORIDE mmol/L 94* 97* 95* 96*   BUN mg/dL 32* 19 43* 27*   CREATININE mg/dL 3.67* 2.55* 4.77* 3.40*    , Renal Lab Trend:   Results from last 7 days   Lab Units 01/08/25 0322 01/07/25 2016 01/07/25 0411 01/06/25  1816   POTASSIUM mmol/L 4.7 4.3 6.0* 3.9   PHOSPHORUS mg/dL 4.0 3.4 4.7 3.1   SODIUM mmol/L 134* 134* 135* 134*   MAGNESIUM mg/dL 2.22  --  2.29  --    EGFR mL/min/1.73m*2 14* 22* 10* 16*   BUN mg/dL 32* 19 43* 27*   CREATININE mg/dL 3.67* 2.55* 4.77* 3.40*    , Vit D:   Lab Results   Component Value Date    VITD25 9 (L) 12/30/2024    , Vit B12:   Lab Results   Component Value Date    [id number] 312 01/16/2024        Nutrition Specific Medications:  Scheduled medications  acetaminophen, 975 mg, oral, q8h  ammonium lactate, , Topical, BID  apixaban, 5 mg, oral, BID  aspirin, 81 mg, oral, Daily  atorvastatin, 80 mg, oral, Nightly  carvedilol, 25 mg, oral, BID  cloNIDine, 0.3 mg, oral, q8h BELEN  dilTIAZem CD, 120 mg, oral, Once  dilTIAZem CD, 360 mg, oral, Daily  fluticasone, 2 spray, Each Nostril, Daily  fluticasone furoate-vilanteroL,  1 puff, inhalation, Daily  gabapentin, 300 mg, oral, Nightly  hydrALAZINE, 100 mg, oral, TID  insulin lispro, 0-10 Units, subcutaneous, TID AC  lidocaine, 1 patch, transdermal, Daily  metoclopramide, 5 mg, oral, q6h  oxygen, , inhalation, Continuous - Inhalation  perflutren lipid microspheres, 0.5-10 mL of dilution, intravenous, Once in imaging  polyethylene glycol, 17 g, oral, Daily  sennosides-docusate sodium, 1 tablet, oral, Nightly  sevelamer carbonate, 800 mg, oral, TID  sodium zirconium cyclosilicate, 10 g, oral, q8h  torsemide, 20 mg, oral, Every Mon/Wed/Fri  torsemide, 20 mg, oral, 2 times per day on Sunday Tuesday Thursday Saturday  valsartan, 320 mg, oral, q PM  vitamin B complex-vitamin C-folic acid, 1 capsule, oral, Daily  white petrolatum, , Topical, BID      Continuous medications     PRN medications  PRN medications: albuterol, diclofenac sodium, diphenhydrAMINE, ipratropium-albuteroL, lidocaine, ondansetron, pramoxine, sodium chloride, SUMAtriptan      I/O:   Last BM Date: 01/07/25; Stool Appearance: Unable to assess (01/08/25 0800)    Dietary Orders (From admission, onward)       Start     Ordered    01/07/25 0730  Adult diet Renal; Potassium Restricted 2 gm (50mEq); 2 - 3 grams Sodium  Diet effective now        Comments: Please, no orange juice with renal diet   Question Answer Comment   Diet type Renal    Potassium restriction: Potassium Restricted 2 gm (50mEq)    Sodium restriction: 2 - 3 grams Sodium        01/07/25 0729    01/03/25 1119  Oral nutritional supplements  Until discontinued        Comments: Strawberry or chocolate only   Question Answer Comment   Deliver with Breakfast    Deliver with Dinner    Select supplement: Ensure Plus        01/03/25 1118    12/29/24 2137  May Not Participate in Room Service  ( ROOM SERVICE MAY NOT PARTICIPATE)  Once        Question:  .  Answer:  Yes    12/29/24 2136                     Estimated Needs:   Total Energy Estimated Needs (kCal):   (7021-1033)  Method for Estimating Needs: MSJ= 1004 using 55.3kg  Total Protein Estimated Needs (g): 70 g  Method for Estimating Needs: 1.3 x 55.3kg  Total Fluid Estimated Needs (mL):  (per MD/team)           Nutrition Diagnosis   Malnutrition Diagnosis  Patient has Malnutrition Diagnosis: No    Nutrition Diagnosis  Patient has Nutrition Diagnosis: Yes  Diagnosis Status (1): Resolved  Nutrition Diagnosis 1: Inadequate oral intake  Related to (1): history of ESRD on HD  As Evidenced by (1): pt eating well  Additional Nutrition Diagnosis: Diagnosis 2  Diagnosis Status (2): New  Nutrition Diagnosis 2: Increased nutrient needs  Related to (2): increased metabolic demand  As Evidenced by (2): ESRD on HD       Nutrition Interventions/Recommendations         Nutrition Prescription:  Individualized Nutrition Prescription Provided for : ONS        Nutrition Interventions:   Interventions: Medical food supplement, Vitamin supplement therapy  Medical Food Supplement: Commercial beverage   Ensure Plus once daily (350kcal and 13gm protein)   Vitamin Supplement Therapy: D  Goal: 10,000IU D2 weekly 6-8 weeks d/t deficiency       Nutrition Monitoring and Evaluation   Food/Nutrient Related History Monitoring  Monitoring and Evaluation Plan: Energy intake  Energy Intake: Estimated energy intake  Criteria: meet > 75% estimated energy needs    Body Composition/Growth/Weight History  Monitoring and Evaluation Plan: Weight, Weight change    Biochemical Data, Medical Tests and Procedures  Monitoring and Evaluation Plan: Electrolyte/renal panel, Glucose/endocrine profile  Criteria: WNL              Time Spent (min): 45 minutes

## 2025-01-08 NOTE — PROGRESS NOTES
Subjective     Interval History: Stacey Heath states breathing improved.    Medications    Current Facility-Administered Medications:     acetaminophen (Tylenol) tablet 975 mg, 975 mg, oral, q8h, Bam Dewitt MD, 975 mg at 01/08/25 1041    albuterol 2.5 mg /3 mL (0.083 %) nebulizer solution 2.5 mg, 2.5 mg, nebulization, q6h PRN, Bam Dewitt MD    ammonium lactate (Lac-Hydrin) 12 % lotion, , Topical, BID, Bam Dewitt MD, Given at 01/08/25 0841    apixaban (Eliquis) tablet 5 mg, 5 mg, oral, BID, Bam Dewitt MD, 5 mg at 01/08/25 0840    aspirin EC tablet 81 mg, 81 mg, oral, Daily, Bam Dewitt MD, 81 mg at 01/08/25 0839    atorvastatin (Lipitor) tablet 80 mg, 80 mg, oral, Nightly, Bam Dewitt MD, 80 mg at 01/07/25 2008    bisacodyl (Dulcolax) suppository 10 mg, 10 mg, rectal, Daily, Bam Dewitt MD    carvedilol (Coreg) tablet 25 mg, 25 mg, oral, BID, Jamie Quiroga MD, 25 mg at 01/08/25 0840    cloNIDine (Catapres) tablet 0.3 mg, 0.3 mg, oral, q8h BELEN, Bam Dewitt MD, 0.3 mg at 01/08/25 0538    diclofenac sodium (Voltaren) 1 % gel 4 g, 4 g, Topical, 4x daily PRN, Bam Dewitt MD, 4 g at 01/07/25 2008    dilTIAZem CD (Cardizem CD) 24 hr capsule 120 mg, 120 mg, oral, Once, Bam Dewitt MD    dilTIAZem CD (Cardizem CD) 24 hr capsule 360 mg, 360 mg, oral, Daily, Bam Dewitt MD, 360 mg at 01/08/25 0841    diphenhydrAMINE (BENADryl) capsule 50 mg, 50 mg, oral, q6h PRN, Bam Dewitt MD, 50 mg at 01/07/25 2105    fluticasone (Flonase) nasal spray 2 spray, 2 spray, Each Nostril, Daily, Bam Dewitt MD, 2 spray at 01/04/25 1256    fluticasone furoate-vilanteroL (Breo Ellipta) 100-25 mcg/dose inhaler 1 puff, 1 puff, inhalation, Daily, Bam Dewitt MD, 1 puff at 12/31/24 0843    gabapentin (Neurontin) capsule 300 mg, 300 mg, oral, Nightly, Bam Dewitt MD, 300 mg at 01/07/25 2008    hydrALAZINE (Apresoline) tablet 100 mg, 100 mg, oral, TID, Bam Dewitt MD, 100 mg at 01/08/25 0839    insulin lispro  injection 0-10 Units, 0-10 Units, subcutaneous, TID AC, Bam Dewitt MD, 2 Units at 01/08/25 1140    ipratropium-albuteroL (Duo-Neb) 0.5-2.5 mg/3 mL nebulizer solution 3 mL, 3 mL, nebulization, q6h PRN, Bam Dewitt MD    lidocaine (LMX) 4 % cream, , Topical, PRN, Bam Dewitt MD, Given at 01/07/25 0840    lidocaine 4 % patch 1 patch, 1 patch, transdermal, Daily, Bam Dewitt MD, 1 patch at 01/03/25 1119    metoclopramide (Reglan) tablet 5 mg, 5 mg, oral, q6h, 5 mg at 01/08/25 1508 **OR** [DISCONTINUED] metoclopramide (Reglan) injection 5 mg, 5 mg, intravenous, q6h, Robby Chen MD    ondansetron (Zofran) injection 4 mg, 4 mg, intravenous, q8h PRN, Bam Dewitt MD, 4 mg at 12/30/24 1817    oxygen (O2) therapy, , inhalation, Continuous - Inhalation, Bam Dewitt MD, 21 percent at 01/08/25 0844    perflutren lipid microspheres (Definity) injection 0.5-10 mL of dilution, 0.5-10 mL of dilution, intravenous, Once in imaging, Bam Dewitt MD    polyethylene glycol (Glycolax, Miralax) packet 17 g, 17 g, oral, Daily, Bam Dewitt MD, 17 g at 01/08/25 0844    pramoxine (Sarna Sensitive) 1 % lotion, , Topical, PRN, Bam Dewitt MD    sennosides-docusate sodium (Jesika-Colace) 8.6-50 mg per tablet 1 tablet, 1 tablet, oral, Nightly, Karen Mack MD, 1 tablet at 01/07/25 2105    sevelamer carbonate (Renvela) tablet 800 mg, 800 mg, oral, TID, Bam Dewitt MD, 800 mg at 01/08/25 1139    sodium chloride (Ocean) 0.65 % nasal spray 2 spray, 2 spray, Each Nostril, 4x daily PRN, Bam Dewitt MD, 2 spray at 01/06/25 0838    sodium zirconium cyclosilicate (Lokelma) packet 10 g, 10 g, oral, q8h, Bam Dewitt MD, 10 g at 01/08/25 0846    SUMAtriptan (Imitrex) tablet 25 mg, 25 mg, oral, q2h PRN, Bam Dewitt MD, 25 mg at 01/07/25 2206    torsemide (Demadex) tablet 20 mg, 20 mg, oral, Every Mon/Wed/Fri, Bam Dewitt MD, 20 mg at 01/08/25 1508    torsemide (Demadex) tablet 20 mg, 20 mg, oral, 2 times per day on Sunday Tuesday  Thursday Saturday, Bam Dewitt MD, 20 mg at 01/07/25 2008    valsartan (Diovan) tablet 320 mg, 320 mg, oral, q PM, Bam Dewitt MD, 320 mg at 01/07/25 2008    vitamin B complex-vitamin C-folic acid (Nephrocaps) capsule 1 capsule, 1 capsule, oral, Daily, Bam Dewitt MD, 1 capsule at 01/08/25 0840    white petrolatum (Aquaphor) ointment, , Topical, BID, Bam Dewitt MD, Given at 01/08/25 0841    Objective     Physical Exam  Heart S1 S2 RRR, Lungs CTA, no edema      Vital signs in last 24 hours:  Temp:  [35.8 °C (96.4 °F)-36.4 °C (97.5 °F)] 36.1 °C (97 °F)  Heart Rate:  [56-83] 56  Resp:  [10-27] 17  Arterial Line BP 1: (131-196)/(42-69) 155/56  FiO2 (%):  [21 %] 21 %       Intake/Output last 3 shifts:  I/O last 3 completed shifts:  In: 1765.1 (28.8 mL/kg) [P.O.:660; I.V.:705.1 (11.5 mL/kg); Other:400]  Out: 2023 (33.1 mL/kg) [Urine:100 (0 mL/kg/hr); Other:1923]  Weight: 61.2 kg     Labs:  Results from last 7 days   Lab Units 01/08/25  0322   WBC AUTO x10*3/uL 6.3   RBC AUTO x10*6/uL 3.90*   HEMOGLOBIN g/dL 11.3*   HEMATOCRIT % 34.8*     Results from last 7 days   Lab Units 01/08/25  0322   SODIUM mmol/L 134*   POTASSIUM mmol/L 4.7   CHLORIDE mmol/L 94*   CO2 mmol/L 31   BUN mg/dL 32*   CREATININE mg/dL 3.67*   CALCIUM mg/dL 9.0   PHOSPHORUS mg/dL 4.0   MAGNESIUM mg/dL 2.22       Assessment/Plan     Assessment & Plan  Flash pulmonary edema    ESRD (end stage renal disease) on dialysis (Multi)    Hypertensive emergency    Plan ultrafiltration today to optimize fluid status today as tolerated.    Abdulaziz Carpenter MD  1/8/2025  3:54 PM

## 2025-01-09 ENCOUNTER — APPOINTMENT (OUTPATIENT)
Dept: CARDIOLOGY | Facility: HOSPITAL | Age: 55
End: 2025-01-09
Payer: COMMERCIAL

## 2025-01-09 LAB
ALBUMIN SERPL BCP-MCNC: 3.2 G/DL (ref 3.4–5)
ANION GAP SERPL CALC-SCNC: 17 MMOL/L (ref 10–20)
BUN SERPL-MCNC: 62 MG/DL (ref 6–23)
CALCIUM SERPL-MCNC: 9.3 MG/DL (ref 8.6–10.6)
CHLORIDE SERPL-SCNC: 93 MMOL/L (ref 98–107)
CO2 SERPL-SCNC: 28 MMOL/L (ref 21–32)
CREAT SERPL-MCNC: 6.08 MG/DL (ref 0.5–1.05)
EGFRCR SERPLBLD CKD-EPI 2021: 8 ML/MIN/1.73M*2
ERYTHROCYTE [DISTWIDTH] IN BLOOD BY AUTOMATED COUNT: 15.1 % (ref 11.5–14.5)
GLUCOSE BLD MANUAL STRIP-MCNC: 139 MG/DL (ref 74–99)
GLUCOSE BLD MANUAL STRIP-MCNC: 157 MG/DL (ref 74–99)
GLUCOSE BLD MANUAL STRIP-MCNC: 226 MG/DL (ref 74–99)
GLUCOSE BLD MANUAL STRIP-MCNC: 242 MG/DL (ref 74–99)
GLUCOSE SERPL-MCNC: 197 MG/DL (ref 74–99)
HCT VFR BLD AUTO: 32.7 % (ref 36–46)
HGB BLD-MCNC: 10.1 G/DL (ref 12–16)
MAGNESIUM SERPL-MCNC: 2.53 MG/DL (ref 1.6–2.4)
MCH RBC QN AUTO: 28.7 PG (ref 26–34)
MCHC RBC AUTO-ENTMCNC: 30.9 G/DL (ref 32–36)
MCV RBC AUTO: 93 FL (ref 80–100)
NRBC BLD-RTO: 0 /100 WBCS (ref 0–0)
PHOSPHATE SERPL-MCNC: 5.2 MG/DL (ref 2.5–4.9)
PLATELET # BLD AUTO: 208 X10*3/UL (ref 150–450)
POTASSIUM SERPL-SCNC: 5.3 MMOL/L (ref 3.5–5.3)
RBC # BLD AUTO: 3.52 X10*6/UL (ref 4–5.2)
SODIUM SERPL-SCNC: 133 MMOL/L (ref 136–145)
WBC # BLD AUTO: 6.6 X10*3/UL (ref 4.4–11.3)

## 2025-01-09 PROCEDURE — 2500000004 HC RX 250 GENERAL PHARMACY W/ HCPCS (ALT 636 FOR OP/ED)

## 2025-01-09 PROCEDURE — 2500000001 HC RX 250 WO HCPCS SELF ADMINISTERED DRUGS (ALT 637 FOR MEDICARE OP)

## 2025-01-09 PROCEDURE — 2500000001 HC RX 250 WO HCPCS SELF ADMINISTERED DRUGS (ALT 637 FOR MEDICARE OP): Performed by: STUDENT IN AN ORGANIZED HEALTH CARE EDUCATION/TRAINING PROGRAM

## 2025-01-09 PROCEDURE — 8010000001 HC DIALYSIS - HEMODIALYSIS PER DAY

## 2025-01-09 PROCEDURE — 2500000002 HC RX 250 W HCPCS SELF ADMINISTERED DRUGS (ALT 637 FOR MEDICARE OP, ALT 636 FOR OP/ED)

## 2025-01-09 PROCEDURE — 99232 SBSQ HOSP IP/OBS MODERATE 35: CPT | Performed by: INTERNAL MEDICINE

## 2025-01-09 PROCEDURE — 93308 TTE F-UP OR LMTD: CPT | Performed by: INTERNAL MEDICINE

## 2025-01-09 PROCEDURE — 85027 COMPLETE CBC AUTOMATED: CPT

## 2025-01-09 PROCEDURE — 82947 ASSAY GLUCOSE BLOOD QUANT: CPT

## 2025-01-09 PROCEDURE — 83735 ASSAY OF MAGNESIUM: CPT

## 2025-01-09 PROCEDURE — 93321 DOPPLER ECHO F-UP/LMTD STD: CPT | Performed by: INTERNAL MEDICINE

## 2025-01-09 PROCEDURE — 80069 RENAL FUNCTION PANEL: CPT

## 2025-01-09 PROCEDURE — 93325 DOPPLER ECHO COLOR FLOW MAPG: CPT | Performed by: INTERNAL MEDICINE

## 2025-01-09 PROCEDURE — 37799 UNLISTED PX VASCULAR SURGERY: CPT

## 2025-01-09 PROCEDURE — 1200000002 HC GENERAL ROOM WITH TELEMETRY DAILY

## 2025-01-09 PROCEDURE — 99291 CRITICAL CARE FIRST HOUR: CPT

## 2025-01-09 RX ORDER — HYDRALAZINE HYDROCHLORIDE 100 MG/1
100 TABLET, FILM COATED ORAL ONCE
Status: COMPLETED | OUTPATIENT
Start: 2025-01-09 | End: 2025-01-09

## 2025-01-09 RX ORDER — HYDRALAZINE HYDROCHLORIDE 50 MG/1
100 TABLET, FILM COATED ORAL 3 TIMES DAILY
Status: DISCONTINUED | OUTPATIENT
Start: 2025-01-10 | End: 2025-01-27 | Stop reason: HOSPADM

## 2025-01-09 RX ORDER — HYDRALAZINE HYDROCHLORIDE 50 MG/1
TABLET, FILM COATED ORAL
Status: DISPENSED
Start: 2025-01-09 | End: 2025-01-10

## 2025-01-09 RX ORDER — OXYCODONE HYDROCHLORIDE 5 MG/1
5 TABLET ORAL ONCE
Status: COMPLETED | OUTPATIENT
Start: 2025-01-09 | End: 2025-01-09

## 2025-01-09 RX ADMIN — INSULIN LISPRO 2 UNITS: 100 INJECTION, SOLUTION INTRAVENOUS; SUBCUTANEOUS at 18:03

## 2025-01-09 RX ADMIN — OXYCODONE 5 MG: 5 TABLET ORAL at 22:00

## 2025-01-09 RX ADMIN — METOCLOPRAMIDE 5 MG: 10 TABLET ORAL at 16:23

## 2025-01-09 RX ADMIN — CLONIDINE HYDROCHLORIDE 0.3 MG: 0.1 TABLET ORAL at 16:23

## 2025-01-09 RX ADMIN — TORSEMIDE 20 MG: 20 TABLET ORAL at 09:03

## 2025-01-09 RX ADMIN — RENO CAPS 1 CAPSULE: 100; 1.5; 1.7; 20; 10; 1; 150; 5; 6 CAPSULE ORAL at 09:02

## 2025-01-09 RX ADMIN — INSULIN LISPRO 4 UNITS: 100 INJECTION, SOLUTION INTRAVENOUS; SUBCUTANEOUS at 09:09

## 2025-01-09 RX ADMIN — ASPIRIN 81 MG: 81 TABLET, COATED ORAL at 09:03

## 2025-01-09 RX ADMIN — ATORVASTATIN CALCIUM 80 MG: 80 TABLET, FILM COATED ORAL at 23:01

## 2025-01-09 RX ADMIN — CLONIDINE HYDROCHLORIDE 0.3 MG: 0.1 TABLET ORAL at 06:26

## 2025-01-09 RX ADMIN — HYDRALAZINE HYDROCHLORIDE 100 MG: 100 TABLET ORAL at 09:02

## 2025-01-09 RX ADMIN — APIXABAN 5 MG: 5 TABLET, FILM COATED ORAL at 09:02

## 2025-01-09 RX ADMIN — HYDRALAZINE HYDROCHLORIDE 100 MG: 50 TABLET ORAL at 19:51

## 2025-01-09 RX ADMIN — HYDRALAZINE HYDROCHLORIDE 100 MG: 100 TABLET ORAL at 16:23

## 2025-01-09 RX ADMIN — APIXABAN 5 MG: 5 TABLET, FILM COATED ORAL at 23:01

## 2025-01-09 RX ADMIN — METOCLOPRAMIDE 5 MG: 10 TABLET ORAL at 09:03

## 2025-01-09 RX ADMIN — SODIUM ZIRCONIUM CYCLOSILICATE 10 G: 10 POWDER, FOR SUSPENSION ORAL at 09:04

## 2025-01-09 RX ADMIN — SEVELAMER CARBONATE 800 MG: 800 TABLET, FILM COATED ORAL at 12:21

## 2025-01-09 RX ADMIN — POLYETHYLENE GLYCOL 3350 17 G: 17 POWDER, FOR SOLUTION ORAL at 09:04

## 2025-01-09 RX ADMIN — CARVEDILOL 25 MG: 12.5 TABLET, FILM COATED ORAL at 23:01

## 2025-01-09 RX ADMIN — DILTIAZEM HYDROCHLORIDE 360 MG: 180 CAPSULE, COATED, EXTENDED RELEASE ORAL at 09:02

## 2025-01-09 RX ADMIN — ACETAMINOPHEN 975 MG: 325 TABLET, FILM COATED ORAL at 09:24

## 2025-01-09 RX ADMIN — SEVELAMER CARBONATE 800 MG: 800 TABLET, FILM COATED ORAL at 09:02

## 2025-01-09 RX ADMIN — CARVEDILOL 25 MG: 12.5 TABLET, FILM COATED ORAL at 09:02

## 2025-01-09 RX ADMIN — SUMATRIPTAN 25 MG: 25 TABLET, FILM COATED ORAL at 16:22

## 2025-01-09 RX ADMIN — SEVELAMER CARBONATE 800 MG: 800 TABLET, FILM COATED ORAL at 16:23

## 2025-01-09 RX ADMIN — TORSEMIDE 20 MG: 20 TABLET ORAL at 22:00

## 2025-01-09 RX ADMIN — CLONIDINE HYDROCHLORIDE 0.3 MG: 0.1 TABLET ORAL at 21:58

## 2025-01-09 RX ADMIN — GABAPENTIN 300 MG: 300 CAPSULE ORAL at 23:01

## 2025-01-09 RX ADMIN — ACETAMINOPHEN 975 MG: 325 TABLET, FILM COATED ORAL at 18:03

## 2025-01-09 ASSESSMENT — PAIN SCALES - GENERAL
PAINLEVEL_OUTOF10: 6
PAINLEVEL_OUTOF10: 6
PAINLEVEL_OUTOF10: 0 - NO PAIN
PAINLEVEL_OUTOF10: 3
PAINLEVEL_OUTOF10: 0 - NO PAIN
PAINLEVEL_OUTOF10: 4

## 2025-01-09 ASSESSMENT — COGNITIVE AND FUNCTIONAL STATUS - GENERAL
DAILY ACTIVITIY SCORE: 18
EATING MEALS: A LITTLE
TOILETING: A LITTLE
STANDING UP FROM CHAIR USING ARMS: A LITTLE
PERSONAL GROOMING: A LITTLE
TURNING FROM BACK TO SIDE WHILE IN FLAT BAD: A LITTLE
CLIMB 3 TO 5 STEPS WITH RAILING: A LITTLE
WALKING IN HOSPITAL ROOM: A LITTLE
DRESSING REGULAR LOWER BODY CLOTHING: A LITTLE
MOVING TO AND FROM BED TO CHAIR: A LITTLE
MOBILITY SCORE: 18
MOVING FROM LYING ON BACK TO SITTING ON SIDE OF FLAT BED WITH BEDRAILS: A LITTLE
HELP NEEDED FOR BATHING: A LITTLE
DRESSING REGULAR UPPER BODY CLOTHING: A LITTLE

## 2025-01-09 ASSESSMENT — PAIN - FUNCTIONAL ASSESSMENT
PAIN_FUNCTIONAL_ASSESSMENT: 0-10

## 2025-01-09 ASSESSMENT — PAIN DESCRIPTION - LOCATION
LOCATION: HEAD
LOCATION: HEAD

## 2025-01-09 NOTE — CARE PLAN
Problem: Pain - Adult  Goal: Verbalizes/displays adequate comfort level or baseline comfort level  Outcome: Progressing     Problem: Safety - Adult  Goal: Free from fall injury  Outcome: Progressing     Problem: Discharge Planning  Goal: Discharge to home or other facility with appropriate resources  Outcome: Progressing     Problem: Chronic Conditions and Co-morbidities  Goal: Patient's chronic conditions and co-morbidity symptoms are monitored and maintained or improved  Outcome: Progressing     Problem: Skin  Goal: Decreased wound size/increased tissue granulation at next dressing change  Outcome: Progressing  Flowsheets (Taken 1/8/2025 2150)  Decreased wound size/increased tissue granulation at next dressing change: Promote sleep for wound healing  Goal: Promote skin healing  Outcome: Progressing  Flowsheets (Taken 1/8/2025 2150)  Promote skin healing: Assess skin/pad under line(s)/device(s)     Problem: Fall/Injury  Goal: Not fall by end of shift  Outcome: Progressing  Goal: Be free from injury by end of the shift  Outcome: Progressing  Goal: Verbalize understanding of personal risk factors for fall in the hospital  Outcome: Progressing  Goal: Verbalize understanding of risk factor reduction measures to prevent injury from fall in the home  Outcome: Progressing  Goal: Use assistive devices by end of the shift  Outcome: Progressing  Goal: Pace activities to prevent fatigue by end of the shift  Outcome: Progressing     Problem: Diabetes  Goal: Achieve decreasing blood glucose levels by end of shift  Outcome: Progressing  Goal: Increase stability of blood glucose readings by end of shift  Outcome: Progressing  Goal: Decrease in ketones present in urine by end of shift  Outcome: Progressing  Goal: Maintain electrolyte levels within acceptable range throughout shift  Outcome: Progressing  Goal: Maintain glucose levels >70mg/dl to <250mg/dl throughout shift  Outcome: Progressing  Goal: No changes in neurological  exam by end of shift  Outcome: Progressing  Goal: Learn about and adhere to nutrition recommendations by end of shift  Outcome: Progressing  Goal: Vital signs within normal range for age by end of shift  Outcome: Progressing  Goal: Increase self care and/or family involovement by end of shift  Outcome: Progressing  Goal: Receive DSME education by end of shift  Outcome: Progressing     Problem: Pain  Goal: Takes deep breaths with improved pain control throughout the shift  Outcome: Progressing  Goal: Turns in bed with improved pain control throughout the shift  Outcome: Progressing  Goal: Walks with improved pain control throughout the shift  Outcome: Progressing  Goal: Performs ADL's with improved pain control throughout shift  Outcome: Progressing  Goal: Participates in PT with improved pain control throughout the shift  Outcome: Progressing  Goal: Free from opioid side effects throughout the shift  Outcome: Progressing  Goal: Free from acute confusion related to pain meds throughout the shift  Outcome: Progressing     Problem: Nutrition  Goal: Less than 5 days NPO/clear liquids  Outcome: Progressing  Goal: Oral intake greater than 50%  Outcome: Progressing  Goal: Oral intake greater 75%  Outcome: Progressing  Goal: Consume prescribed supplement  Outcome: Progressing  Goal: Adequate PO fluid intake  Outcome: Progressing  Goal: Nutrition support goals are met within 48 hrs  Outcome: Progressing  Goal: Nutrition support is meeting 75% of nutrient needs  Outcome: Progressing  Goal: Tube feed tolerance  Outcome: Progressing  Goal: BG  mg/dL  Outcome: Progressing  Goal: Lab values WNL  Outcome: Progressing  Goal: Electrolytes WNL  Outcome: Progressing  Goal: Promote healing  Outcome: Progressing  Goal: Maintain stable weight  Outcome: Progressing  Goal: Reduce weight from edema/fluid  Outcome: Progressing  Goal: Gradual weight gain  Outcome: Progressing  Goal: Improve ostomy output  Outcome: Progressing   The  patient's goals for the shift include      The clinical goals for the shift include maintain SBP <180 throughout shift

## 2025-01-09 NOTE — PROGRESS NOTES
Subjective     Interval History: Stacey Heath has no new complaints  States breathing improved.    Medications    Current Facility-Administered Medications:     acetaminophen (Tylenol) tablet 975 mg, 975 mg, oral, q8h, Bam Dewitt MD, 975 mg at 01/09/25 0924    albuterol 2.5 mg /3 mL (0.083 %) nebulizer solution 2.5 mg, 2.5 mg, nebulization, q6h PRN, Bam Dewitt MD    ammonium lactate (Lac-Hydrin) 12 % lotion, , Topical, BID, Bam Dewitt MD, Given at 01/08/25 2128    apixaban (Eliquis) tablet 5 mg, 5 mg, oral, BID, Bam Dewitt MD, 5 mg at 01/09/25 0902    aspirin EC tablet 81 mg, 81 mg, oral, Daily, Bam Dewitt MD, 81 mg at 01/09/25 0903    atorvastatin (Lipitor) tablet 80 mg, 80 mg, oral, Nightly, Bam Dewitt MD, 80 mg at 01/08/25 2117    bisacodyl (Dulcolax) suppository 10 mg, 10 mg, rectal, Daily, Bam Dewitt MD, 10 mg at 01/08/25 1711    carvedilol (Coreg) tablet 25 mg, 25 mg, oral, BID, Jamie Quiroga MD, 25 mg at 01/09/25 0902    cloNIDine (Catapres) tablet 0.3 mg, 0.3 mg, oral, q8h BELEN, Bam Dewitt MD, 0.3 mg at 01/09/25 0626    diclofenac sodium (Voltaren) 1 % gel 4 g, 4 g, Topical, 4x daily PRN, Bam Dewitt MD, 4 g at 01/08/25 2118    dilTIAZem CD (Cardizem CD) 24 hr capsule 120 mg, 120 mg, oral, Once, Bam Dewitt MD    dilTIAZem CD (Cardizem CD) 24 hr capsule 360 mg, 360 mg, oral, Daily, Bam Dewitt MD, 360 mg at 01/09/25 0902    diphenhydrAMINE (BENADryl) capsule 50 mg, 50 mg, oral, q6h PRN, Bam Dewitt MD, 50 mg at 01/08/25 2126    fluticasone (Flonase) nasal spray 2 spray, 2 spray, Each Nostril, Daily, Bam Dewitt MD, 2 spray at 01/04/25 1256    fluticasone furoate-vilanteroL (Breo Ellipta) 100-25 mcg/dose inhaler 1 puff, 1 puff, inhalation, Daily, Bam Dewitt MD, 1 puff at 12/31/24 0843    gabapentin (Neurontin) capsule 300 mg, 300 mg, oral, Nightly, Bam Dewitt MD, 300 mg at 01/08/25 2117    hydrALAZINE (Apresoline) tablet 100 mg, 100 mg, oral, TID, Bma Dewitt MD,  100 mg at 01/09/25 0902    insulin lispro injection 0-10 Units, 0-10 Units, subcutaneous, TID AC, Bam Dewitt MD, 4 Units at 01/09/25 0909    ipratropium-albuteroL (Duo-Neb) 0.5-2.5 mg/3 mL nebulizer solution 3 mL, 3 mL, nebulization, q6h PRN, Bam Dewitt MD    lidocaine (LMX) 4 % cream, , Topical, PRN, Bam Dewitt MD, Given at 01/07/25 0840    lidocaine 4 % patch 1 patch, 1 patch, transdermal, Daily, Bam Dewitt MD, 1 patch at 01/03/25 1119    metoclopramide (Reglan) tablet 5 mg, 5 mg, oral, q6h, 5 mg at 01/09/25 0903 **OR** [DISCONTINUED] metoclopramide (Reglan) injection 5 mg, 5 mg, intravenous, q6h, Robby Chen MD    ondansetron (Zofran) injection 4 mg, 4 mg, intravenous, q8h PRN, Bam Dewitt MD, 4 mg at 12/30/24 1817    oxygen (O2) therapy, , inhalation, Continuous - Inhalation, Bam Dewitt MD, 21 percent at 01/08/25 0844    perflutren lipid microspheres (Definity) injection 0.5-10 mL of dilution, 0.5-10 mL of dilution, intravenous, Once in imaging, Bam Dewitt MD    polyethylene glycol (Glycolax, Miralax) packet 17 g, 17 g, oral, Daily, Bam Dewitt MD, 17 g at 01/09/25 0904    pramoxine (Sarna Sensitive) 1 % lotion, , Topical, PRN, Bam Dewitt MD    sennosides-docusate sodium (Jesika-Colace) 8.6-50 mg per tablet 1 tablet, 1 tablet, oral, Nightly, Karen Mack MD, 1 tablet at 01/07/25 2105    sevelamer carbonate (Renvela) tablet 800 mg, 800 mg, oral, TID, Bam Dewitt MD, 800 mg at 01/09/25 1221    sodium chloride (Ocean) 0.65 % nasal spray 2 spray, 2 spray, Each Nostril, 4x daily PRN, Bam Dewitt MD, 2 spray at 01/06/25 0838    SUMAtriptan (Imitrex) tablet 25 mg, 25 mg, oral, q2h PRN, Bam Dewitt MD, 25 mg at 01/07/25 2206    torsemide (Demadex) tablet 20 mg, 20 mg, oral, Every Mon/Wed/Fri, Bam Dewitt MD, 20 mg at 01/08/25 1508    torsemide (Demadex) tablet 20 mg, 20 mg, oral, 2 times per day on Sunday Tuesday Thursday Saturday, Bam Dewitt MD, 20 mg at 01/09/25 0903    valsartan  (Diovan) tablet 320 mg, 320 mg, oral, q PM, Bam Dewitt MD, 320 mg at 01/08/25 2117    vitamin B complex-vitamin C-folic acid (Nephrocaps) capsule 1 capsule, 1 capsule, oral, Daily, Bam Dewitt MD, 1 capsule at 01/09/25 0902    white petrolatum (Aquaphor) ointment, , Topical, BID, Bam Dewitt MD, Given at 01/08/25 2128    Objective     Physical Exam  Heart S1 S2 RRR, Lungs CTA, no edema      Vital signs in last 24 hours:  Temp:  [35 °C (95 °F)-36.6 °C (97.9 °F)] 36.5 °C (97.7 °F)  Heart Rate:  [57-81] 59  Resp:  [11-31] 14  Arterial Line BP 1: ()/(43-68) 97/43       Intake/Output last 3 shifts:  I/O last 3 completed shifts:  In: 1281.8 (20.9 mL/kg) [P.O.:420; I.V.:461.8 (7.5 mL/kg); Other:400]  Out: 2000 (32.7 mL/kg) [Other:2000]  Weight: 61.2 kg     Labs:  Results from last 7 days   Lab Units 01/09/25  0303   WBC AUTO x10*3/uL 6.6   RBC AUTO x10*6/uL 3.52*   HEMOGLOBIN g/dL 10.1*   HEMATOCRIT % 32.7*     Results from last 7 days   Lab Units 01/09/25  0303   SODIUM mmol/L 133*   POTASSIUM mmol/L 5.3   CHLORIDE mmol/L 93*   CO2 mmol/L 28   BUN mg/dL 62*   CREATININE mg/dL 6.08*   CALCIUM mg/dL 9.3   PHOSPHORUS mg/dL 5.2*   MAGNESIUM mg/dL 2.53*       Assessment/Plan     Assessment & Plan  Flash pulmonary edema    ESRD (end stage renal disease) on dialysis (Multi)    Hypertensive emergency  Has tolerated fluid without hypotension recently, plan HD today      Abdulaziz Carpenter MD  1/9/2025  3:37 PM

## 2025-01-09 NOTE — PROGRESS NOTES
"Stacey Heath is a 54 y.o. female on day 11 of admission presenting with Hypertensive emergency.    Subjective   No acute events overnight. This am, patient reports her headache is improved. Reports last BM yesterday.   Patient seen and examine at the bedside; denies any chest pain, SOB, palpitation, abdominal pain, n/v/d.     Review of Systems   10/10 negative except stated above    Objective   Last Recorded Vitals  Vitals:    01/09/25 1200   BP:    Pulse: 78   Resp: 14   Temp:    SpO2: 96%     Intake/Output last 3 Shifts:  I/O last 3 completed shifts:  In: 1281.8 (20.9 mL/kg) [P.O.:420; I.V.:461.8 (7.5 mL/kg); Other:400]  Out: 2000 (32.7 mL/kg) [Other:2000]  Weight: 61.2 kg     Physical Exam  Constitutional:       Appearance: Normal appearance.   Cardiovascular:      Rate and Rhythm: Regular rhythm.      Pulses: Normal pulses.      Heart sounds: Murmur heard.      Comments: Grade 4/6 systolic murmur over the aortic and pulmonic valve.   Pulmonary:      Effort: Pulmonary effort is normal.      Breath sounds: Normal breath sounds.   Abdominal:      General: Abdomen is flat. Bowel sounds are normal.     Relevant Results    Labs  Results from last 7 days   Lab Units 01/09/25  0303 01/08/25  0322 01/07/25  0411   WBC AUTO x10*3/uL 6.6 6.3 7.5   HEMOGLOBIN g/dL 10.1* 11.3* 11.2*   HEMATOCRIT % 32.7* 34.8* 35.7*   PLATELETS AUTO x10*3/uL 208 213 215            Results from last 7 days   Lab Units 01/09/25  0303 01/08/25  1712 01/08/25  0322   SODIUM mmol/L 133* 132* 134*   POTASSIUM mmol/L 5.3 5.0 4.7   CHLORIDE mmol/L 93* 92* 94*   CO2 mmol/L 28 27 31   BUN mg/dL 62* 48* 32*   CREATININE mg/dL 6.08* 5.16* 3.67*   CALCIUM mg/dL 9.3 9.2 9.0           No lab exists for component: \"LABALBU\"   Results from last 7 days   Lab Units 01/05/25  1354   APTT seconds 38   INR  1.3*        Medications  Scheduled medications  acetaminophen, 975 mg, oral, q8h  ammonium lactate, , Topical, BID  apixaban, 5 mg, oral, BID  aspirin, 81 " mg, oral, Daily  atorvastatin, 80 mg, oral, Nightly  bisacodyl, 10 mg, rectal, Daily  carvedilol, 25 mg, oral, BID  cloNIDine, 0.3 mg, oral, q8h BELEN  dilTIAZem CD, 120 mg, oral, Once  dilTIAZem CD, 360 mg, oral, Daily  fluticasone, 2 spray, Each Nostril, Daily  fluticasone furoate-vilanteroL, 1 puff, inhalation, Daily  gabapentin, 300 mg, oral, Nightly  hydrALAZINE, 100 mg, oral, TID  insulin lispro, 0-10 Units, subcutaneous, TID AC  lidocaine, 1 patch, transdermal, Daily  metoclopramide, 5 mg, oral, q6h  oxygen, , inhalation, Continuous - Inhalation  perflutren lipid microspheres, 0.5-10 mL of dilution, intravenous, Once in imaging  polyethylene glycol, 17 g, oral, Daily  sennosides-docusate sodium, 1 tablet, oral, Nightly  sevelamer carbonate, 800 mg, oral, TID  torsemide, 20 mg, oral, Every Mon/Wed/Fri  torsemide, 20 mg, oral, 2 times per day on Sunday Tuesday Thursday Saturday  valsartan, 320 mg, oral, q PM  vitamin B complex-vitamin C-folic acid, 1 capsule, oral, Daily  white petrolatum, , Topical, BID      Continuous medications       PRN medications  PRN medications: albuterol, diclofenac sodium, diphenhydrAMINE, ipratropium-albuteroL, lidocaine, ondansetron, pramoxine, sodium chloride, SUMAtriptan     Imaging  Transthoracic Echo (TTE) Limited   Final Result      XR chest 1 view   Final Result   1.  Hypoinflated lungs with findings suggestive of mild pulmonary   edema.                  MACRO:   None        Signed by: Adal Light 1/5/2025 4:38 PM   Dictation workstation:   [id number]      Transthoracic Echo (TTE) Limited   Final Result      Vascular US upper extremity venous duplex left   Final Result      CT head wo IV contrast   Final Result   1. No acute intracranial abnormality.   2. Mild hypoattenuation in the bilateral periventricular white matter   and centrum semiovale is non-specific and likely relate to sequela   from chronic small vessel ischemic disease given patient's age.             I  personally reviewed the images/study and I agree with the findings   as stated by Dr. Gee Goodman. This study was interpreted at [facility], [place], [place].        MACRO:   None        Signed by: Shabbir Romero 12/31/2024 7:13 AM   Dictation workstation:   [id number]      XR chest 1 view   Final Result   1. Unchanged findings of mild interstitial pulmonary edema        MACRO:   None.        Signed by: Deonna Hawkins 12/29/2024 7:23 PM   Dictation workstation:   [id number]          Assessment/Plan   Assessment & Plan  Hypertensive emergency    ESRD (end stage renal disease) on dialysis (Multi)    Flash pulmonary edema    Assessment  Hospital Course:  Stacey Heath is a 54 y.o. female PMH significant for ESRD 2/2 on dialysis T/Th/Sat via RUE AVF (last complete session 01/04/2025), HTN, HFpEF, COPD, brachial DVT on Eliquis who presented for [facility] ED for acute onset shortness of breath. Pt who was admitted to the MICU on 12/29/24 for hypertensive emergency c/b flash pulmonary edema. She was treated with cardene gtt and received UF and HD with nephrology. Got BP below 180 with cardene drip. cardene gtt was weaned off and patient resume home anti-HTN regimen on the floor. Rapid response 1/5 for symptomatic hypertension (headache) 272/82 and patient admitted to CICU.  Upon arrival to the CICU, pt was hypertensive to the 270s/82, started her on clevidipine and continued her home anti-hypertensive meds. She reports having severe headache which has now improved and fatigue, but denies any vision changes, chest pain, SOB, palpitations or dizziness.  Pt has had multiple iHD and UF sessions done in the ICU for volume control. Increased hydralazine from 50mg TID to 100mg TID and Diltiazem to 360mg daily from 240mg daily. Patient with improved BP and off of clevidipine gtt 1/8. Stable for transfer to the floor 1/9.    To do:  -Patient with episodes of hypotension during dialysis, hold  hydralazine and clonidine prior to dialysis so patient can tolerate fluid removal. Will continue torsemide given patient reports urine production.  -C/w current antihypertensives: Coreg 25mg bid, clonidine 0.3mg q8h, diltiazem 360mg daily, hydralazine 100mg TID, valsartan 320mg daily  -Can trial minoxidil 2.5 on non-dialysis days   -Plan for HD today, nephrology following.   -Patient off of Clevidipine drip since 1/8/2025.     Plan:  NEUROLOGY/PSYCH:  Headache likely due to migraine vs Hypertensive emergency   BP was 270s/89 upon arrival to the CICU.   Non-contrast CT head 01/03: Shows no evidence of acute intracranial bleed.   Management:  Weaning pt of Clevidipine   Continue with tylenol for headache   Routine neuro checks in the settings of changes mental status   CARDIOVASCULAR:  Dx:Hypertensive Emergency   :: /80s now improved   :: TTE 01/03/2025 showed EF of 50-55 with left atrium severelydilated,        Mod-severe mitral annular calcification. Severely increased concentric systolic function  Management:  Will wean pt off Clevidipine   SBP goal <180   Continue with home antihypertensive meds   DX: Chronic deep vein thrombosis (DVT) of brachial vein of left upper extremity   Management:  Continue with Eliquis      PULMONARY:Dx: COPD   Sating well on RA    Management:  Continue flonase spray   Continue Albuterol neb q6hr prn      RENAL/GENITOURINARY:  Dx: ESRD   On Dialysis T/Th/Sat  Management:  Nephrology following     GASTROENTEROLOGY:  Dx: Nausea   Dx: Constipation  Plan  - Continue with scheduled Reglan and Zofran 4mg, prn   - Continue with Miralax and started bisacodyl      ENDOCRINOLOGY:  Dx: T2 DM  Recent Gluc is 306  Management:  ACHS  Stated pt on Lispro SS1     HEMATOLOGY:  Dx: Acute on chronic anemia likely due to Anemia of chronic disease in the settings of ESRD.  Management:  Daily CBC      SKIN:  Dx: No active issues   Skin Failure No     MUSCULOSKELETAL:  Dx: ZAIN     INFECTIOUS  DISEASE:  Dx: ZAIN        ICU Check List      FEN  Fluids: As needed   Electrolytes: Replete K>4 and Mag> 2  Nutrition: Regular diet   Prophylaxis:  DVT ppx: SCD and Eliquis   GI ppx: None  Bowel care: Miralax   Hardware:                        Social:  Code: Full Code    HPOA: Rohini Piña (Child)  [phone] (Mobile   Disposition: CICU     Case seen and discussed with attending Dr. Elam, to addend as necessary.    Jamie Quiroga MD PGY-2

## 2025-01-10 ENCOUNTER — APPOINTMENT (OUTPATIENT)
Dept: DIALYSIS | Facility: HOSPITAL | Age: 55
End: 2025-01-10
Payer: COMMERCIAL

## 2025-01-10 LAB
ALBUMIN SERPL BCP-MCNC: 3.9 G/DL (ref 3.4–5)
ANION GAP SERPL CALC-SCNC: 19 MMOL/L (ref 10–20)
BASOPHILS # BLD AUTO: 0.07 X10*3/UL (ref 0–0.1)
BASOPHILS NFR BLD AUTO: 1 %
BUN SERPL-MCNC: 51 MG/DL (ref 6–23)
CALCIUM SERPL-MCNC: 10.1 MG/DL (ref 8.6–10.6)
CHLORIDE SERPL-SCNC: 92 MMOL/L (ref 98–107)
CO2 SERPL-SCNC: 28 MMOL/L (ref 21–32)
CREAT SERPL-MCNC: 5.4 MG/DL (ref 0.5–1.05)
EGFRCR SERPLBLD CKD-EPI 2021: 9 ML/MIN/1.73M*2
EOSINOPHIL # BLD AUTO: 0.92 X10*3/UL (ref 0–0.7)
EOSINOPHIL NFR BLD AUTO: 12.6 %
ERYTHROCYTE [DISTWIDTH] IN BLOOD BY AUTOMATED COUNT: 15 % (ref 11.5–14.5)
GLUCOSE BLD MANUAL STRIP-MCNC: 126 MG/DL (ref 74–99)
GLUCOSE BLD MANUAL STRIP-MCNC: 142 MG/DL (ref 74–99)
GLUCOSE BLD MANUAL STRIP-MCNC: 272 MG/DL (ref 74–99)
GLUCOSE BLD MANUAL STRIP-MCNC: 51 MG/DL (ref 74–99)
GLUCOSE SERPL-MCNC: 236 MG/DL (ref 74–99)
HCT VFR BLD AUTO: 36.6 % (ref 36–46)
HGB BLD-MCNC: 11.4 G/DL (ref 12–16)
IMM GRANULOCYTES # BLD AUTO: 0.02 X10*3/UL (ref 0–0.7)
IMM GRANULOCYTES NFR BLD AUTO: 0.3 % (ref 0–0.9)
LYMPHOCYTES # BLD AUTO: 1.05 X10*3/UL (ref 1.2–4.8)
LYMPHOCYTES NFR BLD AUTO: 14.3 %
MCH RBC QN AUTO: 28.5 PG (ref 26–34)
MCHC RBC AUTO-ENTMCNC: 31.1 G/DL (ref 32–36)
MCV RBC AUTO: 92 FL (ref 80–100)
MONOCYTES # BLD AUTO: 0.6 X10*3/UL (ref 0.1–1)
MONOCYTES NFR BLD AUTO: 8.2 %
NEUTROPHILS # BLD AUTO: 4.66 X10*3/UL (ref 1.2–7.7)
NEUTROPHILS NFR BLD AUTO: 63.6 %
NRBC BLD-RTO: 0 /100 WBCS (ref 0–0)
PHOSPHATE SERPL-MCNC: 5.9 MG/DL (ref 2.5–4.9)
PLATELET # BLD AUTO: 246 X10*3/UL (ref 150–450)
POTASSIUM SERPL-SCNC: 6.3 MMOL/L (ref 3.5–5.3)
RBC # BLD AUTO: 4 X10*6/UL (ref 4–5.2)
SODIUM SERPL-SCNC: 133 MMOL/L (ref 136–145)
WBC # BLD AUTO: 7.3 X10*3/UL (ref 4.4–11.3)

## 2025-01-10 PROCEDURE — 2500000002 HC RX 250 W HCPCS SELF ADMINISTERED DRUGS (ALT 637 FOR MEDICARE OP, ALT 636 FOR OP/ED): Performed by: STUDENT IN AN ORGANIZED HEALTH CARE EDUCATION/TRAINING PROGRAM

## 2025-01-10 PROCEDURE — 2500000001 HC RX 250 WO HCPCS SELF ADMINISTERED DRUGS (ALT 637 FOR MEDICARE OP): Performed by: STUDENT IN AN ORGANIZED HEALTH CARE EDUCATION/TRAINING PROGRAM

## 2025-01-10 PROCEDURE — 80069 RENAL FUNCTION PANEL: CPT | Performed by: STUDENT IN AN ORGANIZED HEALTH CARE EDUCATION/TRAINING PROGRAM

## 2025-01-10 PROCEDURE — 99233 SBSQ HOSP IP/OBS HIGH 50: CPT | Performed by: STUDENT IN AN ORGANIZED HEALTH CARE EDUCATION/TRAINING PROGRAM

## 2025-01-10 PROCEDURE — 1200000002 HC GENERAL ROOM WITH TELEMETRY DAILY

## 2025-01-10 PROCEDURE — 99222 1ST HOSP IP/OBS MODERATE 55: CPT | Performed by: PSYCHIATRY & NEUROLOGY

## 2025-01-10 PROCEDURE — 99232 SBSQ HOSP IP/OBS MODERATE 35: CPT | Performed by: NURSE PRACTITIONER

## 2025-01-10 PROCEDURE — 8010000001 HC DIALYSIS - HEMODIALYSIS PER DAY

## 2025-01-10 PROCEDURE — 2500000004 HC RX 250 GENERAL PHARMACY W/ HCPCS (ALT 636 FOR OP/ED): Performed by: STUDENT IN AN ORGANIZED HEALTH CARE EDUCATION/TRAINING PROGRAM

## 2025-01-10 PROCEDURE — 37799 UNLISTED PX VASCULAR SURGERY: CPT | Performed by: STUDENT IN AN ORGANIZED HEALTH CARE EDUCATION/TRAINING PROGRAM

## 2025-01-10 PROCEDURE — 82947 ASSAY GLUCOSE BLOOD QUANT: CPT

## 2025-01-10 PROCEDURE — 85025 COMPLETE CBC W/AUTO DIFF WBC: CPT | Performed by: STUDENT IN AN ORGANIZED HEALTH CARE EDUCATION/TRAINING PROGRAM

## 2025-01-10 RX ORDER — TRAMADOL HYDROCHLORIDE 50 MG/1
50 TABLET ORAL ONCE
Status: COMPLETED | OUTPATIENT
Start: 2025-01-10 | End: 2025-01-10

## 2025-01-10 RX ORDER — DEXTROSE 50 % IN WATER (D50W) INTRAVENOUS SYRINGE
25 ONCE
Status: DISCONTINUED | OUTPATIENT
Start: 2025-01-10 | End: 2025-01-10

## 2025-01-10 RX ORDER — DEXTROSE MONOHYDRATE 100 MG/ML
50 INJECTION, SOLUTION INTRAVENOUS CONTINUOUS
Status: DISCONTINUED | OUTPATIENT
Start: 2025-01-10 | End: 2025-01-10

## 2025-01-10 RX ADMIN — SUMATRIPTAN 25 MG: 25 TABLET, FILM COATED ORAL at 00:36

## 2025-01-10 RX ADMIN — DILTIAZEM HYDROCHLORIDE 360 MG: 180 CAPSULE, COATED, EXTENDED RELEASE ORAL at 09:03

## 2025-01-10 RX ADMIN — APIXABAN 5 MG: 5 TABLET, FILM COATED ORAL at 08:59

## 2025-01-10 RX ADMIN — CARVEDILOL 25 MG: 12.5 TABLET, FILM COATED ORAL at 09:01

## 2025-01-10 RX ADMIN — ASPIRIN 81 MG: 81 TABLET, COATED ORAL at 09:00

## 2025-01-10 RX ADMIN — SEVELAMER CARBONATE 800 MG: 800 TABLET, FILM COATED ORAL at 12:38

## 2025-01-10 RX ADMIN — HYDRALAZINE HYDROCHLORIDE 100 MG: 50 TABLET ORAL at 21:40

## 2025-01-10 RX ADMIN — VALSARTAN 320 MG: 160 TABLET, FILM COATED ORAL at 21:40

## 2025-01-10 RX ADMIN — SODIUM ZIRCONIUM CYCLOSILICATE 10 G: 10 POWDER, FOR SUSPENSION ORAL at 21:40

## 2025-01-10 RX ADMIN — TRAMADOL HYDROCHLORIDE 50 MG: 50 TABLET, COATED ORAL at 20:00

## 2025-01-10 RX ADMIN — METOCLOPRAMIDE 5 MG: 10 TABLET ORAL at 21:40

## 2025-01-10 RX ADMIN — METOCLOPRAMIDE 5 MG: 10 TABLET ORAL at 03:26

## 2025-01-10 RX ADMIN — RENO CAPS 1 CAPSULE: 100; 1.5; 1.7; 20; 10; 1; 150; 5; 6 CAPSULE ORAL at 09:00

## 2025-01-10 RX ADMIN — ATORVASTATIN CALCIUM 80 MG: 80 TABLET, FILM COATED ORAL at 21:40

## 2025-01-10 RX ADMIN — PETROLATUM: 420 OINTMENT TOPICAL at 21:41

## 2025-01-10 RX ADMIN — APIXABAN 5 MG: 5 TABLET, FILM COATED ORAL at 21:40

## 2025-01-10 RX ADMIN — ACETAMINOPHEN 975 MG: 325 TABLET, FILM COATED ORAL at 21:39

## 2025-01-10 RX ADMIN — ACETAMINOPHEN 975 MG: 325 TABLET, FILM COATED ORAL at 03:26

## 2025-01-10 RX ADMIN — INSULIN LISPRO 6 UNITS: 100 INJECTION, SOLUTION INTRAVENOUS; SUBCUTANEOUS at 09:06

## 2025-01-10 RX ADMIN — ACETAMINOPHEN 975 MG: 325 TABLET, FILM COATED ORAL at 12:19

## 2025-01-10 RX ADMIN — SODIUM ZIRCONIUM CYCLOSILICATE 10 G: 10 POWDER, FOR SUSPENSION ORAL at 13:55

## 2025-01-10 RX ADMIN — SEVELAMER CARBONATE 800 MG: 800 TABLET, FILM COATED ORAL at 09:00

## 2025-01-10 RX ADMIN — VALSARTAN 320 MG: 160 TABLET, FILM COATED ORAL at 03:26

## 2025-01-10 RX ADMIN — Medication: at 21:41

## 2025-01-10 RX ADMIN — Medication: at 09:00

## 2025-01-10 RX ADMIN — GABAPENTIN 300 MG: 300 CAPSULE ORAL at 21:40

## 2025-01-10 ASSESSMENT — COGNITIVE AND FUNCTIONAL STATUS - GENERAL
CLIMB 3 TO 5 STEPS WITH RAILING: A LITTLE
WALKING IN HOSPITAL ROOM: A LITTLE
MOBILITY SCORE: 23
MOBILITY SCORE: 22
DAILY ACTIVITIY SCORE: 24
CLIMB 3 TO 5 STEPS WITH RAILING: A LITTLE
DAILY ACTIVITIY SCORE: 24

## 2025-01-10 ASSESSMENT — PAIN SCALES - GENERAL
PAINLEVEL_OUTOF10: 5 - MODERATE PAIN
PAINLEVEL_OUTOF10: 7
PAINLEVEL_OUTOF10: 0 - NO PAIN
PAINLEVEL_OUTOF10: 5 - MODERATE PAIN
PAINLEVEL_OUTOF10: 0 - NO PAIN
PAINLEVEL_OUTOF10: 5 - MODERATE PAIN

## 2025-01-10 ASSESSMENT — PAIN DESCRIPTION - LOCATION: LOCATION: HEAD

## 2025-01-10 ASSESSMENT — PAIN - FUNCTIONAL ASSESSMENT
PAIN_FUNCTIONAL_ASSESSMENT: 0-10

## 2025-01-10 ASSESSMENT — PAIN DESCRIPTION - DESCRIPTORS: DESCRIPTORS: HEADACHE

## 2025-01-10 NOTE — PROGRESS NOTES
Stacey Jackson is a 54 y.o. female on day 12 of admission presenting with Hypertensive emergency.      Subjective   NE       Objective     Last Recorded Vitals  /60   Pulse 62   Temp 36.3 °C (97.3 °F)   Resp 16   Wt 59.4 kg (131 lb)   SpO2 99%   Intake/Output last 3 Shifts:    Intake/Output Summary (Last 24 hours) at 1/10/2025 0758  Last data filed at 1/9/2025 1510  Gross per 24 hour   Intake 1440 ml   Output 2400 ml   Net -960 ml       Admission Weight  Weight: 55.3 kg (122 lb) (12/29/24 1821)    Daily Weight  01/10/25 : 59.4 kg (131 lb)    Image Results  Transthoracic Echo (TTE) Limited     [facility], [address] [place] [address]                 Tel [phone] and Fax [phone]    TRANSTHORACIC ECHOCARDIOGRAM REPORT       Patient Name:       STACEY JACKSON      Reading Physician:    [id number] Feliciano Eason MD  Study Date:         1/6/2025            Ordering Provider:    [id number] JAQUAN ZAMORA  MRN/PID:            [id number]            Fellow:  Accession#:         [id number]        Nurse:  Date of Birth/Age:  1970 / 54 years Sonographer:          Mily Savage RDCS  Gender assigned at  F                   Additional Staff:  Birth:  Height:             139.70 cm           Admit Date:           12/29/2024  Weight:             58.97 kg            Admission Status:     Inpatient - STAT  BSA / BMI:          1.46 m2 / 30.22     Encounter#:           [id number]                      kg/m2  Blood Pressure:     132/46 mmHg         Department Location:  [facility]    Study Type:    TRANSTHORACIC ECHO (TTE) LIMITED  Diagnosis/ICD: Acute diastolic (congestive) heart failure (CHF)-I50.31  Indication:    Only LVOT obstruction evaluation while patient on dialysis  CPT Code:      Echo Limited-63879;  Doppler Limited-00990; Color Doppler-52366    Patient History:  Pertinent History: Hypertensive emergency c/b flash pulmonary edema.    Study Detail: The following Echo studies were performed: 2D, M-Mode, Doppler and                color flow.       PHYSICIAN INTERPRETATION:  Left Ventricle: The left ventricular systolic function is normal, with a visually estimated ejection fraction of 55-60%. There are no regional left ventricular wall motion abnormalities. The left ventricular cavity size is normal. Left ventricular diastolic filling cannot be determined, due to severe mitral annular calcification (MAC). No significant changes in aortic outflowwith Valsalva.  Left Atrium: The left atrium is enlarged.  Right Ventricle: The right ventricle is normal in size. There is reduced right ventricular systolic function.  Right Atrium: The right atrium is normal in size.  Aortic Valve: The aortic valve is trileaflet. There is minimal aortic valve cusp calcification. There are increased aortic valve velocities due to increased flow/dynamic ejection. The aortic valve dimensionless index is 0.95. There is no evidence of aortic valve regurgitation. The peak instantaneous gradient of the aortic valve is 16 mmHg. The mean gradient of the aortic valve is 7 mmHg.  Mitral Valve: The mitral valve is mildly thickened. There is moderate to severe mitral annular calcification. There is mild mitral valve regurgitation.  Tricuspid Valve: The tricuspid valve is structurally normal. Tricuspid regurgitation was not assessed.  Pulmonic Valve: The pulmonic valve was not assessed. Pulmonic valve regurgitation was not assessed.  Pericardium: Trivial to small pericardial effusion.  Aorta: The aortic root is normal.  In comparison to the previous echocardiogram(s): Compared with study dated 1/3/2025, no significant change.       CONCLUSIONS:   1. The left ventricular systolic function is normal, with a visually estimated ejection fraction of  55-60%.   2. Left ventricular diastolic filling cannot be determined, due to severe mitral annular calcification (MAC).   3. No significant changes in aortic outflowwith Valsalva.   4. There is reduced right ventricular systolic function.   5. The left atrium is enlarged.   6. There is moderate to severe mitral annular calcification.    RECOMMENDATIONS:  Utilizing an FDA cleared automated machine learning algorithm (EchoGo Heart Failure by [employer]), the analysis of the apical 4-chamber echocardiogram suggests the presence of heart failure with preserved ejection fraction (HFpEF)*. Clinical correlation looking for additional heart failure signs and symptoms is recommended, as a definite diagnosis of heart failure cannot be made by imaging alone.  *Per ACC/AHA/HFSA universal diagnosis of heart failure, HFpEF is defined as 1) signs and symptoms leading to clinical diagnosis of heart failure, 2) an ejection fraction of at least 50%, and 3) evidence of elevated intra-cardiac filling pressures by echocardiography, BNP elevation, or catheterization.     QUANTITATIVE DATA SUMMARY:     LV SYSTOLIC FUNCTION BY 2D PLANIMETRY (MOD):                       Normal Ranges:  EF-Visual:      58 %  LV EF Reported: 58 %       AORTIC VALVE:                      Normal Ranges:  AoV Vmax:                1.98 m/s  (<=1.7m/s)  AoV Peak PG:             15.7 mmHg (<20mmHg)  AoV Mean P.0 mmHg  (1.7-11.5mmHg)  LVOT Max Sim:            1.66 m/s  (<=1.1m/s)  AoV VTI:                 36.20 cm  (18-25cm)  LVOT VTI:                34.50 cm  LVOT Diameter:           1.80 cm   (1.8-2.4cm)  AoV Area, VTI:           2.43 cm2  (2.5-5.5cm2)  AoV Area,Vmax:           2.13 cm2  (2.5-4.5cm2)  AoV Dimensionless Index: 0.95       [id number] Feliciano Eason MD  Electronically signed on 2025 at 3:33:08 PM       ** Final **      Physical Exam    Constitutional:       Appearance: Normal appearance.   Cardiovascular:      Rate and Rhythm: Regular  rhythm.      Pulses: Normal pulses.     Pulmonary:      Effort: Pulmonary effort is normal.      Breath sounds: Normal breath sounds.   Abdominal:      General: Abdomen is flat. Bowel sounds are normal.   Relevant Results               Assessment/Plan                  Assessment & Plan  Flash pulmonary edema    ESRD (end stage renal disease) on dialysis (Multi)    Hypertensive emergency       Stacey Heath is a 54 y.o. female PMH significant for ESRD 2/2 on dialysis T/Th/Sat via RUE AVF (last complete session 01/04/2025), HTN, HFpEF, COPD, brachial DVT on Eliquis who presented for [facility] ED for acute onset shortness of breath. Pt who was admitted to the MICU on 12/29/24 for hypertensive emergency c/b flash pulmonary edema. She was treated with cardene gtt and received UF and HD with nephrology. Got BP below 180 with cardene drip. cardene gtt was weaned off and patient resume home anti-HTN regimen on the floor. Rapid response 1/5 for symptomatic hypertension (headache) 272/82 and patient admitted to CICU. Upon arrival to the CICU, pt was hypertensive to the 270s/82, started her on clevidipine and continued her home anti-hypertensive meds. She reports having severe headache which has now improved and fatigue, but denies any vision changes, chest pain, SOB, palpitations or dizziness.  Pt has had multiple iHD and UF sessions done in the ICU for volume control. Increased hydralazine from 50mg TID to 100mg TID and Diltiazem to 360mg daily from 240mg daily. Patient with improved BP and off of clevidipine gtt 1/8. Stable for transfer to the floor 1/9. HDS      -Patient with episodes of hypotension during dialysis, hold hydralazine and clonidine prior to dialysis so patient can tolerate fluid removal. Will continue torsemide given patient reports urine production.  -C/w current antihypertensives: Coreg 25mg bid, clonidine 0.3mg q8h, diltiazem 360mg daily, hydralazine 100mg TID, valsartan 320mg daily  -Can trial minoxidil  2.5 on non-dialysis days   -Nephrology following.          Hypertensive emergency  HTN  Acute on chronic HFpEF (ef 60-65%, 4/3/24)  HLD  CAD   ESRD   T2DM  Polyneuropathy    COPD   GERD   DVT  -continue with Eliquis 5mg BID            Code: Full Code    HPOA: Rohini Piña (Child)  [phone] (Mobile                 Soniya Ruff MD

## 2025-01-10 NOTE — PROGRESS NOTES
"Stacey Heath is a 54 y.o. female on day 12 of admission presenting with Hypertensive emergency.    Subjective   Pt resting in bed. Denies n/v/d, fever, cough, chills, pain, chest pains, light head, constipation , feels dizziness which she think is from blood pressure dropping too fast, sob has improved       Objective     Physical Exam  Vitals and nursing note reviewed.   Cardiovascular:      Rate and Rhythm: Normal rate.   Pulmonary:      Comments: Cont POX at 97% room air  Mckinley lung sounds dim  Abdominal:      General: There is distension.      Comments: Mild distension non-tender to palpation   Genitourinary:     Comments: States make urine  Musculoskeletal:      Comments: Ble without edema   Skin:     General: Skin is warm and dry.   Neurological:      Mental Status: She is alert and oriented to person, place, and time.   Psychiatric:         Mood and Affect: Mood normal.         Behavior: Behavior normal.         Last Recorded Vitals  Blood pressure 142/53, pulse 62, temperature 36.6 °C (97.9 °F), resp. rate 18, height 1.397 m (4' 7\"), weight 59.4 kg (131 lb), SpO2 97%.  Intake/Output last 3 Shifts:  I/O last 3 completed shifts:  In: 2040 (34.3 mL/kg) [P.O.:240; I.V.:1000 (16.8 mL/kg); Other:800]  Out: 4400 (74 mL/kg) [Other:4400]  Weight: 59.4 kg     Relevant Results  Scheduled medications  acetaminophen, 975 mg, oral, q8h  ammonium lactate, , Topical, BID  apixaban, 5 mg, oral, BID  aspirin, 81 mg, oral, Daily  atorvastatin, 80 mg, oral, Nightly  bisacodyl, 10 mg, rectal, Daily  carvedilol, 25 mg, oral, BID  cloNIDine, 0.3 mg, oral, q8h BELEN  dilTIAZem CD, 360 mg, oral, Daily  fluticasone, 2 spray, Each Nostril, Daily  fluticasone furoate-vilanteroL, 1 puff, inhalation, Daily  gabapentin, 300 mg, oral, Nightly  hydrALAZINE, 100 mg, oral, TID  insulin lispro, 0-10 Units, subcutaneous, TID AC  lidocaine, 1 patch, transdermal, Daily  metoclopramide, 5 mg, oral, q6h  oxygen, , inhalation, Continuous - " Inhalation  perflutren lipid microspheres, 0.5-10 mL of dilution, intravenous, Once in imaging  polyethylene glycol, 17 g, oral, Daily  sennosides-docusate sodium, 1 tablet, oral, Nightly  sevelamer carbonate, 800 mg, oral, TID  sodium zirconium cyclosilicate, 10 g, oral, TID  torsemide, 20 mg, oral, 2 times per day on Sunday Tuesday Thursday Saturday  valsartan, 320 mg, oral, q PM  vitamin B complex-vitamin C-folic acid, 1 capsule, oral, Daily  white petrolatum, , Topical, BID      Continuous medications     PRN medications  PRN medications: albuterol, diclofenac sodium, diphenhydrAMINE, ipratropium-albuteroL, lidocaine, ondansetron, pramoxine, sodium chloride, SUMAtriptan   Results for orders placed or performed during the hospital encounter of 12/29/24 (from the past 24 hours)   POCT GLUCOSE   Result Value Ref Range    POCT Glucose 226 (H) 74 - 99 mg/dL   CBC and Auto Differential   Result Value Ref Range    WBC 7.3 4.4 - 11.3 x10*3/uL    nRBC 0.0 0.0 - 0.0 /100 WBCs    RBC 4.00 4.00 - 5.20 x10*6/uL    Hemoglobin 11.4 (L) 12.0 - 16.0 g/dL    Hematocrit 36.6 36.0 - 46.0 %    MCV 92 80 - 100 fL    MCH 28.5 26.0 - 34.0 pg    MCHC 31.1 (L) 32.0 - 36.0 g/dL    RDW 15.0 (H) 11.5 - 14.5 %    Platelets 246 150 - 450 x10*3/uL    Neutrophils % 63.6 40.0 - 80.0 %    Immature Granulocytes %, Automated 0.3 0.0 - 0.9 %    Lymphocytes % 14.3 13.0 - 44.0 %    Monocytes % 8.2 2.0 - 10.0 %    Eosinophils % 12.6 0.0 - 6.0 %    Basophils % 1.0 0.0 - 2.0 %    Neutrophils Absolute 4.66 1.20 - 7.70 x10*3/uL    Immature Granulocytes Absolute, Automated 0.02 0.00 - 0.70 x10*3/uL    Lymphocytes Absolute 1.05 (L) 1.20 - 4.80 x10*3/uL    Monocytes Absolute 0.60 0.10 - 1.00 x10*3/uL    Eosinophils Absolute 0.92 (H) 0.00 - 0.70 x10*3/uL    Basophils Absolute 0.07 0.00 - 0.10 x10*3/uL   Renal Function Panel   Result Value Ref Range    Glucose 236 (H) 74 - 99 mg/dL    Sodium 133 (L) 136 - 145 mmol/L    Potassium 6.3 (HH) 3.5 - 5.3 mmol/L     Chloride 92 (L) 98 - 107 mmol/L    Bicarbonate 28 21 - 32 mmol/L    Anion Gap 19 10 - 20 mmol/L    Urea Nitrogen 51 (H) 6 - 23 mg/dL    Creatinine 5.40 (H) 0.50 - 1.05 mg/dL    eGFR 9 (L) >60 mL/min/1.73m*2    Calcium 10.1 8.6 - 10.6 mg/dL    Phosphorus 5.9 (H) 2.5 - 4.9 mg/dL    Albumin 3.9 3.4 - 5.0 g/dL   POCT GLUCOSE   Result Value Ref Range    POCT Glucose 272 (H) 74 - 99 mg/dL   POCT GLUCOSE   Result Value Ref Range    POCT Glucose 51 (L) 74 - 99 mg/dL   POCT GLUCOSE   Result Value Ref Range    POCT Glucose 142 (H) 74 - 99 mg/dL     *Note: Due to a large number of results and/or encounters for the requested time period, some results have not been displayed. A complete set of results can be found in Results Review.                             Assessment/Plan   Assessment & Plan  Flash pulmonary edema    ESRD (end stage renal disease) on dialysis (Multi)    Hypertensive emergency    Tolerated hemodialysis yesterday with net fluid loss 2L.  1/10- Pt with elevated K+=6.3 will dialyze today  for 2 hrs, repeat lab later this evening    Overall Hemodynamically unstable . 1/9-Bp with tx (102//46),  euvolemic on exam and has stable electrolytes .      Outpatient Dialysis schedule:   TTS [facility]/Dr Barraza      Access: rt fist with +thrill/bruit- no issues - able to achieve      Anemia of ESRD: not on SOFÍA.. current hgb 11.4.. will cont to monitor       CKD-MBD Phosphate Binder:sevelamer carbonate (Renvela) tablet 800 mg tid ac, vitamin B complex-vitamin C-folic acid (Nephrocaps) capsule 1 capsule daily     Plan HD tomorrow with UF as tolerated     Renal diet      Please obtain daily standing wt (if possible)     Medication to be adjusted for ESRD      Patient to continue regular HD schedule while inpatient and to follow with the outpatient nephrologist at discharge      Subsequent visit    I spent 35 minutes in the professional and overall care of this patient.      Arleen Reed, APRN-CNP

## 2025-01-10 NOTE — PROGRESS NOTES
Transitional Care Coordination Progress Note:  Patient discussed during interdisciplinary rounds.  Team members present: MD, AGUSTIN  Plan per Medical/Surgical team: Pt presenting with c/o acute onset on SOB and is s/p MICU transfer, she was on Bipap/nitro gtt for hypertensive emergency while in MICU. Per attending pt will need dialysis today and tomorrow for hyperkalemia.  [facility]  Status: Inpatient  Discharge disposition: [facility] for RN/LPN (assessment/med compliance), PT/OT, and SW services.  Potential Barriers: none  ADOD: 1/11  Met with pt to discuss discharge planning. Per chart review pt was active with [facility] for RN/LPN, PT/OT, and SW visits. Pt stated she has yet to receive SOC due to repeat hospitalizations however, she is interested in receiving above home care services at time of discharge. Pt voiced no additional questions or concerns regarding discharge planning. Care coordinator will continue to follow for discharge planning needs.     Dorothy Ramos RN  Transitional Care Coordinator (TCC)  [phone] or [phone]

## 2025-01-10 NOTE — CONSULTS
"Inpatient consult to Psychiatry  Consult performed by: Winston Murcia DO  Consult ordered by: Soniya Ruff MD  Reason for consult: Concern for hallucinations           Subjective   HISTORY OF PRESENT ILLNESS:  Stacey Heath is a 54 y.o. 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 Psychiatry was consulted on 1/10 for concern for hallucinations.    On chart review, patient required a cardene drip initially but was able to be resumed on home antihypertensives on the floor. On 1/5, a rapid response was called for symptomatic hypertension and found to have a BP of 272/82. She was admitted to CICU at that time. After further management of her blood pressure, she was determined to be stable and transferred to the [facility] on 1/9.     On interview, Mrs. Heath is laying in bed watching television. She explains that she has had difficulty with falling asleep and frequent nighttime awakenings since being in the hospital. She believes that she averages 2-3 hours of sleep per night. When she was readmitted to the ICU, she explains that she started to have \"weird experiences\" that only occur at night. She explains that she will be in a completely different place (i.e. at home speaking with her , in a car with the doors wide open driving in a bad neighborhood). She will then close her eyes and reopen them to find herself in her bed in the hospital. She does not feel that these are dreams because she feels like she is awake during these episodes. She has no history of parasomnias including sleep walking, sleep talking.     She has never had a similar experience prior to this, and these events have only occurred 4-5 times during this hospitalization. She is unaware of any " triggers or patterns associated with these events.     Attempted to speak with the patient's  via phone call, but he did not  x2. The patient was able to reach her daughter via phone call who is closely involved in her mother's care. Both the patient and her daughter agreed after further discussion that this was likely sleep related. However, neither wanted any medication changes or additions at this time as she is on a complicated regimen for her medical conditions.     PSYCHIATRIC REVIEW OF SYSTEMS  Depression: negative  Anxiety: excessive worry that is difficult to control, restlessness or feeling keyed up or on edge, sleep disturbance, and worries of looming dangers/catastrophes  Maryam: negative  Psychosis: negative  Delirium: negative   Trauma: negative    PSYCHIATRIC HISTORY  Prior diagnoses: Anxiety, Depression per patient  Prior hospitalizations: None reported  History of suicide attempts: None reported  History of self-harm: None reported  History of trauma/abuse/loss: None reported  History of violence: None reported    Current psychiatrist: None reported  Current mental health agency: None reported  Current : None reported  Current outpatient treatment: None reported  Guardian or payee: None reported    Current psychiatric medications: hydroxyzine  Past psychiatric medications: None reported  Past psychiatric treatments: None reported    Family psychiatric history: Not aware of any psychiatric history    SUBSTANCE USE HISTORY   She reports that she has quit smoking. Her smoking use included cigarettes. She has been exposed to tobacco smoke. She has never used smokeless tobacco. She reports that she does not currently use alcohol. She reports that she does not currently use drugs after having used the following drugs: Cocaine and Marijuana.    Tobacco: Reports having quit  Alcohol: Denied  Cannabis: Denied  Other substances: Denied    SOCIAL HISTORY  Social History      Socioeconomic History    Marital status: Single   Tobacco Use    Smoking status: Former     Types: Cigarettes     Passive exposure: Past    Smokeless tobacco: Never   Vaping Use    Vaping status: Never Used   Substance and Sexual Activity    Alcohol use: Not Currently    Drug use: Not Currently     Types: Cocaine, Marijuana    Sexual activity: Defer     Social Drivers of Health     Financial Resource Strain: Low Risk  (1/2/2025)    Overall Financial Resource Strain (CARDIA)     Difficulty of Paying Living Expenses: Not very hard   Food Insecurity: No Food Insecurity (12/29/2024)    Hunger Vital Sign     Worried About Running Out of Food in the Last Year: Never true     Ran Out of Food in the Last Year: Never true   Transportation Needs: No Transportation Needs (1/2/2025)    PRAPARE - Transportation     Lack of Transportation (Medical): No     Lack of Transportation (Non-Medical): No   Physical Activity: Patient Unable To Answer (12/17/2024)    Exercise Vital Sign     Days of Exercise per Week: Patient unable to answer     Minutes of Exercise per Session: Patient unable to answer   Stress: Patient Unable To Answer (12/17/2024)    [employer] - Occupational Stress Questionnaire     Feeling of Stress : Patient unable to answer   Social Connections: Feeling Socially Integrated (12/18/2024)    OASIS : Social Isolation     Frequency of experiencing loneliness or isolation: Never   Intimate Partner Violence: Not At Risk (12/29/2024)    Humiliation, Afraid, Rape, and Kick questionnaire     Fear of Current or Ex-Partner: No     Emotionally Abused: No     Physically Abused: No     Sexually Abused: No   Housing Stability: Low Risk  (1/2/2025)    Housing Stability Vital Sign     Unable to Pay for Housing in the Last Year: No     Number of Times Moved in the Last Year: 0     Homeless in the Last Year: No      Current living situation: at home with her   Current stressors: Difficulty  with sleeping, hospitalization, medical conditions    Born and raised: [place], [place]  Family: reports good relationship  Marital status:    Children: Yes  Social support: Family, daughter    PAST MEDICAL HISTORY  Past Medical History:   Diagnosis Date    Bacteremia due to methicillin resistant Staphylococcus aureus 07/08/2024    Cardiac/pericardial tamponade 01/20/2024    CHF (congestive heart failure)     COPD (chronic obstructive pulmonary disease) (Multi)     Coronary artery disease     Disorder of sweat glands 02/25/2023    Dry eye syndrome of bilateral lacrimal glands 03/07/2017    Dry eyes    ESRD (end stage renal disease) (Multi)     Essential (primary) hypertension 12/27/2022    Hypertension    History of acute pancreatitis 12/21/2020    History of acute pancreatitis    Low grade squamous intraepithelial lesion (LGSIL) on cervicovaginal cytologic smear 02/25/2023    Migraines     History of migraine    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    Type 2 myocardial infarction (Multi) 01/20/2024        PAST SURGICAL HISTORY  Past Surgical History:   Procedure Laterality Date    APPENDECTOMY  03/07/2017    Appendectomy    CT ABDOMEN ANGIOGRAM W AND/OR WO IV CONTRAST  04/23/2023    CT ABDOMEN ANGIOGRAM W AND/OR WO IV CONTRAST [facility]    OTHER SURGICAL HISTORY  03/07/2017    Cystoscopy With Pyeloscopy With Removal Of Calculus    OTHER SURGICAL HISTORY  03/07/2017    Anoscopy For Polyp Removal    OTHER SURGICAL HISTORY  12/08/2021    Arteriovenous fistula creation procedure    OTHER SURGICAL HISTORY  12/08/2021    Dialysis tunneled catheter placement    TUBAL LIGATION  03/07/2017    Tubal Ligation        FAMILY HISTORY  Family History   Problem Relation Name Age of Onset    Other (Cerebrovascular Accident) Father      Heart failure Paternal Grandmother      Breast cancer Paternal Grandmother      Other (Primary Cervical Cancer) Paternal Grandmother      Diabetes Paternal Grandfather      Breast  "cancer Father's Sister      Ovarian cancer Father's Sister          ALLERGIES  Iodine, Bee pollen, Bioflavonoids, Citrus and derivatives, Codeine, Flowers, Gadolinium-containing contrast media, and Shellfish containing products    OARRS REVIEW  OARRS checked: on 01/10/25 by Winston Murcia DO  OARRS comments: Score 260, Gabapentin last filled 9/20/24, tramadol last filled 10/6/2023    Objective   VITALS      1/9/2025     8:00 PM 1/9/2025     8:32 PM 1/10/2025    12:06 AM 1/10/2025     5:00 AM 1/10/2025     6:21 AM 1/10/2025     8:21 AM 1/10/2025    12:40 PM   Vitals   Systolic 200 171 160  145 175 170   Diastolic 71 68 79  60 76 62   Heart Rate 75 74 80  62 75 67   Temp   36.5 °C (97.7 °F)  36.3 °C (97.3 °F) 35.8 °C (96.4 °F) 35.8 °C (96.4 °F)   Resp 14 17 18  16 18    Weight (lb)    131      BMI    30.45 kg/m2      BSA (m2)    1.52 m2         Mental Status Exam:  General: Female laying in bed comfortably in no acute distress. Watching television with her phone and tablet next to her. Blinds are down  Appearance: Appears stated age and Casually dressed  Attitude/Behavior: Cooperative, conversant, engaged, and with good eye contact.  Motor Activity: No psychomotor agitation or retardation. No abnormal movements, tremors or tics  Speech: Normal rate, tone and rhythm and coherent speech  Mood: \"Good\" and Denies mood concerns  Affect: Euthymic, full-range, bright, and Congruent with mood and topic of conversation  Thought Process: Linear, goal directed  Thought Content: Appropriate with no SI or HI and No perseverations or obsessions identified  Perception: No perceptual abnormalities noted and Does not appear to be internally stimulated  Cognition: Alert and oriented x4 to person, place, time, and situation  Insight: Fair, in regards to understanding mental health condition  Judgement: Fair      HOME MEDICATIONS  Medication Documentation Review Audit       Reviewed by Steve Mancini [facility] (Pharmacist) on 12/30/24 at " 1046      Medication Order Taking? Sig Documenting Provider Last Dose Status   acetaminophen (Tylenol) 325 mg tablet [id number]  Take 2 tablets (650 mg) by mouth every 6 hours if needed for headaches. Jayla Otero MD  Active   albuterol (ProAir HFA) 90 mcg/actuation inhaler [id number]  Inhale 2 puffs every 4 hours if needed for wheezing or shortness of breath. Soniya Ruff MD  Active   albuterol 1.25 mg/3 mL nebulizer solution [id number]  Take 3 mL (1.25 mg) by nebulization every 6 hours if needed for wheezing or shortness of breath. Catrachita Valverde MD  Active   apixaban (Eliquis) 5 mg tablet [id number]  Take 1 tablet (5 mg) by mouth 2 times a day. Jayla Otero MD  Active   aspirin 81 mg EC tablet [id number]  Take 1 tablet (81 mg) by mouth once daily. Catrachita Valverde MD  Active   atorvastatin (Lipitor) 80 mg tablet [id number]  Take 1 tablet (80 mg) by mouth once daily at bedtime. Catrachita Valverde MD  Active   calcium acetate (Phoslo) 667 mg capsule [id number]  Take 2 capsules (1,334 mg) by mouth 3 times daily (morning, midday, late afternoon). Catrachita Valverde MD  Active   carvedilol (Coreg) 25 mg tablet [id number]  Take 1 tablet (25 mg) by mouth 2 times a day. Jayla Otero MD  Active   cloNIDine (Catapres) 0.1 mg tablet [id number]  Take 2 tablets (0.2 mg) by mouth once daily at bedtime. Jayla Otero MD  Active   diclofenac sodium (Voltaren) 1 % gel [id number]  Apply 4.5 inches (4 g) topically 4 times a day as needed (back pain). Jayla Otero MD  Active   docusate sodium (Colace) 100 mg capsule [id number]  Take 1 capsule (100 mg) by mouth 2 times a day as needed for constipation. Jayla Otero MD  Active   epoetin joceline (Epogen,Procrit) 10,000 unit/mL injection [id number]  Inject 1 mL (10,000 Units) under the skin 3 times a week. LILLY Oliva-CNP  Active   fluticasone (Flonase) 50 mcg/actuation nasal spray [id number]  Administer 2 sprays into each nostril  once daily. Shake gently. Before first use, prime pump. After use, clean tip and replace cap.   Patient taking differently: Administer 2 sprays into each nostril once daily as needed for allergies. Shake gently. Before first use, prime pump. After use, clean tip and replace cap.    Catrachita Valverde MD  Active   fluticasone furoate-vilanteroL (Breo Ellipta) 100-25 mcg/dose inhaler [id number]  Inhale 1 puff once daily. Lisa Domínguez MD  Active   gabapentin (Neurontin) 300 mg capsule [id number]  Take 1 capsule (300 mg) by mouth once daily at bedtime.   Patient taking differently: Take 1 capsule (300 mg) by mouth as needed at bedtime (pain).    Lisa Domínguez MD   24 235   lidocaine 4 % patch [id number]  Place 1 patch over 12 hours on the skin once daily. Remove & discard patch within 12 hours or as directed by MD. Jayla Otero MD  Active   NIFEdipine ER (Adalat CC) 90 mg 24 hr tablet [id number]  Take 1 tablet (90 mg) by mouth once daily at bedtime. Do not crush, chew, or split. Jayla Otero MD  Active   pantoprazole (ProtoNix) 40 mg EC tablet [id number]  Take 1 tablet (40 mg) by mouth if needed (not regular take it). Do not crush, chew, or split. Jayla Otero MD  Active   polyethylene glycol (Glycolax, Miralax) 17 gram/dose powder [id number]  Take 17 g (1 scoop dissolved in liquid) by mouth once daily. Jayla Otero MD  Active   SUMAtriptan (Imitrex) 25 mg tablet [id number]  Take 1 tablet (25 mg) by mouth 1 time if needed for migraine. May repeat dose once in 2 hours if no relief.  Do not exceed 2 doses in 24 hours. Lisa Domínguez MD   24 235   torsemide (Demadex) 20 mg tablet [id number]  Take 1 tablet after dialysis on dialysis days and twice daily on non dialysis days Makenna Barraza MD  Active   valsartan (Diovan) 320 mg tablet [id number] No Take 1 tablet (320 mg) by mouth once daily in the evening.   Patient not taking: Reported on 2024    Catrachita Sow  MD Nicolle Not Taking Active   vitamin B complex-vitamin C-folic acid (Nephrocaps) 1 mg capsule [id number]  Take 1 capsule by mouth once daily. Jayla Otero MD  Active                     CURRENT MEDICATIONS  Scheduled medications  acetaminophen, 975 mg, oral, q8h  ammonium lactate, , Topical, BID  apixaban, 5 mg, oral, BID  aspirin, 81 mg, oral, Daily  atorvastatin, 80 mg, oral, Nightly  bisacodyl, 10 mg, rectal, Daily  carvedilol, 25 mg, oral, BID  cloNIDine, 0.3 mg, oral, q8h BELEN  dilTIAZem CD, 360 mg, oral, Daily  fluticasone, 2 spray, Each Nostril, Daily  fluticasone furoate-vilanteroL, 1 puff, inhalation, Daily  gabapentin, 300 mg, oral, Nightly  hydrALAZINE, 100 mg, oral, TID  insulin lispro, 0-10 Units, subcutaneous, TID AC  lidocaine, 1 patch, transdermal, Daily  metoclopramide, 5 mg, oral, q6h  oxygen, , inhalation, Continuous - Inhalation  perflutren lipid microspheres, 0.5-10 mL of dilution, intravenous, Once in imaging  polyethylene glycol, 17 g, oral, Daily  sennosides-docusate sodium, 1 tablet, oral, Nightly  sevelamer carbonate, 800 mg, oral, TID  sodium zirconium cyclosilicate, 10 g, oral, TID  torsemide, 20 mg, oral, 2 times per day on Sunday Tuesday Thursday Saturday  valsartan, 320 mg, oral, q PM  vitamin B complex-vitamin C-folic acid, 1 capsule, oral, Daily  white petrolatum, , Topical, BID        Continuous medications       PRN medications  PRN medications: albuterol, diclofenac sodium, diphenhydrAMINE, ipratropium-albuteroL, lidocaine, ondansetron, pramoxine, sodium chloride, SUMAtriptan     LABS  Results for orders placed or performed during the hospital encounter of 12/29/24 (from the past 24 hours)   POCT GLUCOSE   Result Value Ref Range    POCT Glucose 157 (H) 74 - 99 mg/dL   POCT GLUCOSE   Result Value Ref Range    POCT Glucose 226 (H) 74 - 99 mg/dL   CBC and Auto Differential   Result Value Ref Range    WBC 7.3 4.4 - 11.3 x10*3/uL    nRBC 0.0 0.0 - 0.0 /100 WBCs    RBC 4.00 4.00 -  5.20 x10*6/uL    Hemoglobin 11.4 (L) 12.0 - 16.0 g/dL    Hematocrit 36.6 36.0 - 46.0 %    MCV 92 80 - 100 fL    MCH 28.5 26.0 - 34.0 pg    MCHC 31.1 (L) 32.0 - 36.0 g/dL    RDW 15.0 (H) 11.5 - 14.5 %    Platelets 246 150 - 450 x10*3/uL    Neutrophils % 63.6 40.0 - 80.0 %    Immature Granulocytes %, Automated 0.3 0.0 - 0.9 %    Lymphocytes % 14.3 13.0 - 44.0 %    Monocytes % 8.2 2.0 - 10.0 %    Eosinophils % 12.6 0.0 - 6.0 %    Basophils % 1.0 0.0 - 2.0 %    Neutrophils Absolute 4.66 1.20 - 7.70 x10*3/uL    Immature Granulocytes Absolute, Automated 0.02 0.00 - 0.70 x10*3/uL    Lymphocytes Absolute 1.05 (L) 1.20 - 4.80 x10*3/uL    Monocytes Absolute 0.60 0.10 - 1.00 x10*3/uL    Eosinophils Absolute 0.92 (H) 0.00 - 0.70 x10*3/uL    Basophils Absolute 0.07 0.00 - 0.10 x10*3/uL   Renal Function Panel   Result Value Ref Range    Glucose 236 (H) 74 - 99 mg/dL    Sodium 133 (L) 136 - 145 mmol/L    Potassium 6.3 (HH) 3.5 - 5.3 mmol/L    Chloride 92 (L) 98 - 107 mmol/L    Bicarbonate 28 21 - 32 mmol/L    Anion Gap 19 10 - 20 mmol/L    Urea Nitrogen 51 (H) 6 - 23 mg/dL    Creatinine 5.40 (H) 0.50 - 1.05 mg/dL    eGFR 9 (L) >60 mL/min/1.73m*2    Calcium 10.1 8.6 - 10.6 mg/dL    Phosphorus 5.9 (H) 2.5 - 4.9 mg/dL    Albumin 3.9 3.4 - 5.0 g/dL   POCT GLUCOSE   Result Value Ref Range    POCT Glucose 272 (H) 74 - 99 mg/dL   POCT GLUCOSE   Result Value Ref Range    POCT Glucose 51 (L) 74 - 99 mg/dL   POCT GLUCOSE   Result Value Ref Range    POCT Glucose 142 (H) 74 - 99 mg/dL     *Note: Due to a large number of results and/or encounters for the requested time period, some results have not been displayed. A complete set of results can be found in Results Review.        IMAGING  Transthoracic Echo (TTE) Limited    Result Date: 1/10/2025   [facility] (formerly [facility] [address] [place] [address]                Tel [phone] and Fax [phone] TRANSTHORACIC ECHOCARDIOGRAM REPORT  Patient Name:       EMILEE JACKSON       Reading Physician:    [id number] Mariposa Nicole MD Study Date:         1/9/2025            Ordering Provider:    [id number] JAQUAN ZAMORA MRN/PID:            [id number]            Fellow: Accession#:         [id number]        Nurse: Date of Birth/Age:  1970 / 54 years Sonographer:          DIANN SMITH Gender assigned at  F                   Additional Staff: Birth: Height:                                 Admit Date:           12/29/2024 Weight:                                 Admission Status:     Inpatient -                                                               Routine BSA / BMI:          m2 / kg/m2          Encounter#:           [id number] Blood Pressure:     /                   Department Location:  [facility] Study Type:    TRANSTHORACIC ECHO (TTE) LIMITED Diagnosis/ICD: Encounter for screening for cardiovascular disorders-Z13.6 Indication:    LVOT gradient CPT Code:      Echo Limited-52532; Doppler Limited-61379; Color Doppler-81525  Study Detail: The following Echo studies were performed: 2D, Doppler and color               flow.  PHYSICIAN INTERPRETATION: Left Ventricle: The left ventricle was not well visualized. The left ventricular ejection fraction could not be measured. The left ventricular cavity size is normal. Left ventricular diastolic filling was not assessed. Left Atrium: The left atrium is mildly dilated. Right Ventricle: The right ventricle is normal in size. Unable to determine right ventricular systolic function. Right Atrium: The right atrium is normal in size. Aortic Valve: The aortic valve was not assessed. Aortic valve regurgitation was not assessed. Mitral Valve: The mitral valve is normal in structure. There is trace mitral valve regurgitation. Tricuspid Valve: The tricuspid valve is structurally normal. Tricuspid regurgitation was not assessed. Pulmonic  Valve: The pulmonic valve was not assessed. Pulmonic valve regurgitation was not assessed. Pericardium: Pericardial effusion was not assessed. Aorta: The aortic root was not assessed.  CONCLUSIONS:  1. The left ventricle was not well visualized. The left ventricular ejection fraction could not be measured.  2. Unable to determine right ventricular systolic function.  3. The left atrium is mildly dilated.  4. Limited fellow echo. QUANTITATIVE DATA SUMMARY:  AORTIC VALVE:          Normal Ranges: LVOT Max Sim: 0.93 m/s (<=1.1m/s) LVOT VTI:     17.00 cm  [id number] Mariposa Nicole MD Electronically signed on 1/10/2025 at 2:26:11 PM  ** Final **       PSYCHIATRIC RISK ASSESSMENT  Violence Risk Factors:  none  Acute Risk of Harm to Others is Considered: Low  Suicide Risk Factors: chronic medical illness  Protective Factors: strong coping skills, sense of responsibility towards family, social support/connectedness, positive family relationships, hopefulness/future-orientation, and marriage/partnership  Acute Risk of Harm to Self is Considered: Low    Assessment/Plan    Stacey Heath is a 54 y.o. 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 Psychiatry was consulted on 1/10 for concern for hallucinations.    On initial assessment, patient is alert and oriented x4 to person, place, time and situation. Description of experience that was concerning for hallucinations likely related to current insomnia during this admission. Suspect that they are likely vivid dreams/nightmares. Discussed sleep hygiene and offered potential medication management of insomnia. Patient and her daughter are not interested in medication changes at this time.     EKG (1/6/2025): QTc Fredericia of  "400ms    IMPRESSION  #Insomnia  #Nightmares    RECOMMENDATIONS    Safety:  - Patient does not currently meet criteria for inpatient psychiatric admission. - To evaluate decision-making capacity, recommend use of the Capacity Evaluation Tool. Search “ IP Capacity Evaluation under SmartText\" unless the patient has a legal guardian, in which case all decisions per the legal guardian.  - Patient does not require a 1:1 sitter from a psychiatric perspective at this time.  - Defer to primary team decision for 1:1 sitter.  - As with all hospitalized patients, would recommend delirium precautions, as below.    Work-up:  - NTD    Medications:  - Patient and daughter are not interested in medication management for sleep at this time.   - Could consider adjustments to patient's clonidine, gabapentin or hydroxyzine if interested in the future.     Follow-up:  - If there are ongoing sleep concerns or patient continues to endorse insomnia, recommend close follow-up with her PCP.    - Discussed recommendations with primary team.  - Psychiatry will continue to follow but not daily.    Please page [phone] with any questions or concerns.    Patient seen and staffed with Dr. Bello, who agrees with above plan.    Medication Consent  Medication Consent: n/a; consult service    Winston Murcia DO    "

## 2025-01-11 ENCOUNTER — APPOINTMENT (OUTPATIENT)
Dept: DIALYSIS | Facility: HOSPITAL | Age: 55
End: 2025-01-11
Payer: COMMERCIAL

## 2025-01-11 ENCOUNTER — DOCUMENTATION (OUTPATIENT)
Dept: HOME HEALTH SERVICES | Facility: HOME HEALTH | Age: 55
End: 2025-01-11
Payer: COMMERCIAL

## 2025-01-11 LAB
GLUCOSE BLD MANUAL STRIP-MCNC: 300 MG/DL (ref 74–99)
GLUCOSE BLD MANUAL STRIP-MCNC: 92 MG/DL (ref 74–99)

## 2025-01-11 PROCEDURE — 99233 SBSQ HOSP IP/OBS HIGH 50: CPT | Performed by: INTERNAL MEDICINE

## 2025-01-11 PROCEDURE — 2500000001 HC RX 250 WO HCPCS SELF ADMINISTERED DRUGS (ALT 637 FOR MEDICARE OP): Performed by: STUDENT IN AN ORGANIZED HEALTH CARE EDUCATION/TRAINING PROGRAM

## 2025-01-11 PROCEDURE — 99233 SBSQ HOSP IP/OBS HIGH 50: CPT | Performed by: STUDENT IN AN ORGANIZED HEALTH CARE EDUCATION/TRAINING PROGRAM

## 2025-01-11 PROCEDURE — 2500000002 HC RX 250 W HCPCS SELF ADMINISTERED DRUGS (ALT 637 FOR MEDICARE OP, ALT 636 FOR OP/ED): Performed by: STUDENT IN AN ORGANIZED HEALTH CARE EDUCATION/TRAINING PROGRAM

## 2025-01-11 PROCEDURE — 82947 ASSAY GLUCOSE BLOOD QUANT: CPT

## 2025-01-11 PROCEDURE — 2500000004 HC RX 250 GENERAL PHARMACY W/ HCPCS (ALT 636 FOR OP/ED): Performed by: STUDENT IN AN ORGANIZED HEALTH CARE EDUCATION/TRAINING PROGRAM

## 2025-01-11 PROCEDURE — 1200000002 HC GENERAL ROOM WITH TELEMETRY DAILY

## 2025-01-11 RX ORDER — DIPHENHYDRAMINE HCL 25 MG
25 CAPSULE ORAL ONCE
Status: DISCONTINUED | OUTPATIENT
Start: 2025-01-11 | End: 2025-01-11

## 2025-01-11 RX ADMIN — CARVEDILOL 25 MG: 12.5 TABLET, FILM COATED ORAL at 13:28

## 2025-01-11 RX ADMIN — CLONIDINE HYDROCHLORIDE 0.3 MG: 0.1 TABLET ORAL at 13:28

## 2025-01-11 RX ADMIN — CARVEDILOL 25 MG: 12.5 TABLET, FILM COATED ORAL at 21:05

## 2025-01-11 RX ADMIN — ASPIRIN 81 MG: 81 TABLET, COATED ORAL at 13:28

## 2025-01-11 RX ADMIN — VALSARTAN 320 MG: 160 TABLET, FILM COATED ORAL at 21:05

## 2025-01-11 RX ADMIN — APIXABAN 5 MG: 5 TABLET, FILM COATED ORAL at 21:05

## 2025-01-11 RX ADMIN — METOCLOPRAMIDE 5 MG: 10 TABLET ORAL at 21:05

## 2025-01-11 RX ADMIN — CLONIDINE HYDROCHLORIDE 0.3 MG: 0.1 TABLET ORAL at 04:49

## 2025-01-11 RX ADMIN — ACETAMINOPHEN 975 MG: 325 TABLET, FILM COATED ORAL at 10:19

## 2025-01-11 RX ADMIN — Medication: at 21:10

## 2025-01-11 RX ADMIN — HYDRALAZINE HYDROCHLORIDE 100 MG: 50 TABLET ORAL at 21:05

## 2025-01-11 RX ADMIN — SUMATRIPTAN 25 MG: 25 TABLET, FILM COATED ORAL at 01:37

## 2025-01-11 RX ADMIN — INSULIN LISPRO 6 UNITS: 100 INJECTION, SOLUTION INTRAVENOUS; SUBCUTANEOUS at 18:26

## 2025-01-11 RX ADMIN — RENO CAPS 1 CAPSULE: 100; 1.5; 1.7; 20; 10; 1; 150; 5; 6 CAPSULE ORAL at 13:34

## 2025-01-11 RX ADMIN — DILTIAZEM HYDROCHLORIDE 360 MG: 180 CAPSULE, COATED, EXTENDED RELEASE ORAL at 13:28

## 2025-01-11 RX ADMIN — DIPHENHYDRAMINE HYDROCHLORIDE 50 MG: 25 CAPSULE ORAL at 13:35

## 2025-01-11 RX ADMIN — SEVELAMER CARBONATE 800 MG: 800 TABLET, FILM COATED ORAL at 13:28

## 2025-01-11 RX ADMIN — METOCLOPRAMIDE 5 MG: 10 TABLET ORAL at 13:28

## 2025-01-11 RX ADMIN — CARVEDILOL 25 MG: 12.5 TABLET, FILM COATED ORAL at 01:37

## 2025-01-11 RX ADMIN — TORSEMIDE 20 MG: 20 TABLET ORAL at 13:28

## 2025-01-11 RX ADMIN — Medication: at 13:29

## 2025-01-11 RX ADMIN — ACETAMINOPHEN 975 MG: 325 TABLET, FILM COATED ORAL at 18:26

## 2025-01-11 RX ADMIN — ACETAMINOPHEN 975 MG: 325 TABLET, FILM COATED ORAL at 04:49

## 2025-01-11 RX ADMIN — HYDRALAZINE HYDROCHLORIDE 100 MG: 50 TABLET ORAL at 13:28

## 2025-01-11 RX ADMIN — APIXABAN 5 MG: 5 TABLET, FILM COATED ORAL at 13:28

## 2025-01-11 RX ADMIN — SUMATRIPTAN 25 MG: 25 TABLET, FILM COATED ORAL at 18:26

## 2025-01-11 RX ADMIN — SEVELAMER CARBONATE 800 MG: 800 TABLET, FILM COATED ORAL at 18:26

## 2025-01-11 RX ADMIN — SENNOSIDES AND DOCUSATE SODIUM 1 TABLET: 50; 8.6 TABLET ORAL at 21:05

## 2025-01-11 RX ADMIN — PETROLATUM: 420 OINTMENT TOPICAL at 21:16

## 2025-01-11 RX ADMIN — CLONIDINE HYDROCHLORIDE 0.3 MG: 0.1 TABLET ORAL at 21:05

## 2025-01-11 RX ADMIN — METOCLOPRAMIDE 5 MG: 10 TABLET ORAL at 04:49

## 2025-01-11 RX ADMIN — TORSEMIDE 20 MG: 20 TABLET ORAL at 21:05

## 2025-01-11 RX ADMIN — GABAPENTIN 300 MG: 300 CAPSULE ORAL at 21:05

## 2025-01-11 RX ADMIN — PETROLATUM: 420 OINTMENT TOPICAL at 13:29

## 2025-01-11 RX ADMIN — ATORVASTATIN CALCIUM 80 MG: 80 TABLET, FILM COATED ORAL at 21:05

## 2025-01-11 ASSESSMENT — COGNITIVE AND FUNCTIONAL STATUS - GENERAL
MOBILITY SCORE: 24
DAILY ACTIVITIY SCORE: 24

## 2025-01-11 ASSESSMENT — PAIN SCALES - GENERAL
PAINLEVEL_OUTOF10: 0 - NO PAIN
PAINLEVEL_OUTOF10: 0 - NO PAIN
PAINLEVEL_OUTOF10: 8
PAINLEVEL_OUTOF10: 0 - NO PAIN

## 2025-01-11 ASSESSMENT — PAIN SCALES - WONG BAKER: WONGBAKER_NUMERICALRESPONSE: HURTS EVEN MORE

## 2025-01-11 ASSESSMENT — PAIN DESCRIPTION - DESCRIPTORS: DESCRIPTORS: ACHING;HEADACHE

## 2025-01-11 ASSESSMENT — PAIN DESCRIPTION - LOCATION: LOCATION: HEAD

## 2025-01-11 NOTE — PROGRESS NOTES
Stacey Jackson is a 54 y.o. female on day 13 of admission presenting with Hypertensive emergency.      Subjective   NE       Objective     Last Recorded Vitals  /78   Pulse 90   Temp 36.7 °C (98.1 °F)   Resp 16   Wt 59.4 kg (131 lb)   SpO2 99%   Intake/Output last 3 Shifts:    Intake/Output Summary (Last 24 hours) at 1/11/2025 1607  Last data filed at 1/11/2025 1301  Gross per 24 hour   Intake 1600 ml   Output 3400 ml   Net -1800 ml       Admission Weight  Weight: 55.3 kg (122 lb) (12/29/24 1821)    Daily Weight  01/10/25 : 59.4 kg (131 lb)    Image Results  Transthoracic Echo (TTE) Limited     [facility], [address] [place] [address]                 Tel [phone] and Fax [phone]    TRANSTHORACIC ECHOCARDIOGRAM REPORT       Patient Name:       STACEY JACKSON      Reading Physician:    [id number] Mariposa Nicole MD  Study Date:         1/9/2025            Ordering Provider:    [id number] JAQUAN ZAMORA  MRN/PID:            [id number]            Fellow:  Accession#:         [id number]        Nurse:  Date of Birth/Age:  1970 / 54 years Sonographer:          DIANN SMITH  Gender assigned at  F                   Additional Staff:  Birth:  Height:                                 Admit Date:           12/29/2024  Weight:                                 Admission Status:     Inpatient -                                                                Routine  BSA / BMI:          m2 / kg/m2          Encounter#:           [id number]  Blood Pressure:     /                   Department Location:  [facility]    Study Type:    TRANSTHORACIC ECHO (TTE) LIMITED  Diagnosis/ICD: Encounter for screening for cardiovascular disorders-Z13.6  Indication:    LVOT gradient  CPT Code:      Echo Limited-13912; Doppler Limited-49539; Color Doppler-63307   Study Detail: The  following Echo studies were performed: 2D, Doppler and color                flow.       PHYSICIAN INTERPRETATION:  Left Ventricle: The left ventricle was not well visualized. The left ventricular ejection fraction could not be measured. The left ventricular cavity size is normal. Left ventricular diastolic filling was not assessed.  Left Atrium: The left atrium is mildly dilated.  Right Ventricle: The right ventricle is normal in size. Unable to determine right ventricular systolic function.  Right Atrium: The right atrium is normal in size.  Aortic Valve: The aortic valve was not assessed. Aortic valve regurgitation was not assessed.  Mitral Valve: The mitral valve is normal in structure. There is trace mitral valve regurgitation.  Tricuspid Valve: The tricuspid valve is structurally normal. Tricuspid regurgitation was not assessed.  Pulmonic Valve: The pulmonic valve was not assessed. Pulmonic valve regurgitation was not assessed.  Pericardium: Pericardial effusion was not assessed.  Aorta: The aortic root was not assessed.       CONCLUSIONS:   1. The left ventricle was not well visualized. The left ventricular ejection fraction could not be measured.   2. Unable to determine right ventricular systolic function.   3. The left atrium is mildly dilated.   4. Limited fellow echo.    QUANTITATIVE DATA SUMMARY:     AORTIC VALVE:          Normal Ranges:  LVOT Max Sim: 0.93 m/s (<=1.1m/s)  LVOT VTI:     17.00 cm       [id number] Mariposa Nicole MD  Electronically signed on 1/10/2025 at 2:26:11 PM       ** Final **      Physical Exam    Constitutional:       Appearance: Normal appearance.   Cardiovascular:      Rate and Rhythm: Regular rhythm.      Pulses: Normal pulses.     Pulmonary:      Effort: Pulmonary effort is normal.      Breath sounds: Normal breath sounds.   Abdominal:      General: Abdomen is flat. Bowel sounds are normal.   Relevant Results               Assessment/Plan                  Assessment & Plan  Flash  pulmonary edema    ESRD (end stage renal disease) on dialysis (Multi)    Hypertensive emergency       Stacey Heath is a 54 y.o. female PMH significant for ESRD 2/2 on dialysis T/Th/Sat via RUE AVF (last complete session 01/04/2025), HTN, HFpEF, COPD, brachial DVT on Eliquis who presented for [facility] ED for acute onset shortness of breath. Pt who was admitted to the MICU on 12/29/24 for hypertensive emergency c/b flash pulmonary edema. She was treated with cardene gtt and received UF and HD with nephrology. Got BP below 180 with cardene drip. cardene gtt was weaned off and patient resume home anti-HTN regimen on the floor. Rapid response 1/5 for symptomatic hypertension (headache) 272/82 and patient admitted to CICU. Upon arrival to the CICU, pt was hypertensive to the 270s/82, started her on clevidipine and continued her home anti-hypertensive meds. She reports having severe headache which has now improved and fatigue, but denies any vision changes, chest pain, SOB, palpitations or dizziness.  Pt has had multiple iHD and UF sessions done in the ICU for volume control. Increased hydralazine from 50mg TID to 100mg TID and Diltiazem to 360mg daily from 240mg daily. Patient with improved BP and off of clevidipine gtt 1/8. Stable for transfer to the floor 1/9. HDS      -Patient with episodes of hypotension during dialysis, hold hydralazine and clonidine prior to dialysis so patient can tolerate fluid removal. Will continue torsemide given patient reports urine production.  -C/w current antihypertensives: Coreg 25mg bid, clonidine 0.3mg q8h, diltiazem 360mg daily, hydralazine 100mg TID, valsartan 320mg daily  -Can trial minoxidil 2.5 on non-dialysis days   -Nephrology following.   -Lab reviewed   -still hypertensive, above DW (59 kg, DW should be around 50).  Reviewed echo with cardiology, diastolic dysfunction, LV is tiny.    -Renal to have a discussion with her and her daughter about what their goals are--she keeps  doing this she will need a heart-kidney transplant soon   -HD sessions today and yesterday  -Goal is to get to around dw of 51 kg.still around 59 today and she's only 50 kg person; being 9 kg         Hypertensive emergency  HTN  Acute on chronic HFpEF (ef 60-65%, 4/3/24)  HLD  CAD   ESRD   T2DM  Polyneuropathy    COPD   GERD   DVT  -continue with Eliquis 5mg BID            Code: Full Code    HPOA: Rohini Piña (Child)  [phone] (Mobile     70 min             Soniya Ruff MD

## 2025-01-11 NOTE — NURSING NOTE
"Report to Receiving RN:    Report To: PONCHO Solo  Time Report Called: [id number]  Hand-Off Communication: pt stable and tolerated HD tx, pt c/o migraine during tx, 975 mg of acetaminophen given, pt states headache \"let up\" a little, post vitals : bp 180/76, pulse 92, pt removed 2.4 liters  Complications During Treatment: No  Ultrafiltration Treatment: Yes  Medications Administered During Dialysis: Yes  Blood Products Administered During Dialysis: No  Labs Sent During Dialysis: No  Heparin Drip Rate Changes: No  Dialysis Catheter Dressing: n/a  Last Dressing Change: n/a      Last Updated: 12:26 PM by DULCE CUNNINGHAM         "

## 2025-01-11 NOTE — NURSING NOTE
Report to Receiving RN:    Report To: PONCHO Vickers  Time Report Called: 2044  Hand-Off Communication: Tolerated HD Tx well. Removed 500 ml fluid.  Complained of headache and received Tramadol. Pain score 5/10 now.  AOX4. POST VS: 205/81, 78.  Complications During Treatment: No  Ultrafiltration Treatment: Yes  Medications Administered During Dialysis: Yes  Blood Products Administered During Dialysis: N/A  Labs Sent During Dialysis: No  Heparin Drip Rate Changes: N/A  Dialysis Catheter Dressing: N/A  Last Dressing Change:     Last Updated: 8:46 PM by KEY EARLY

## 2025-01-11 NOTE — PROGRESS NOTES
Nephrology Follow-up Note   Patient ID: Stacey Heath is a 54 y.o. female.   Admitted for :   Chief Complaint   Patient presents with    Shortness of Breath      Evaluation of patient on dialysis at [facility]  [address]  [facility] [phone] on 12/29/2024  Seen and examined during hemodialysis. Labs and events reviewed.      Subjective:   Feels OK, no c/o CP/SOB/F/C/Abd pain  No c/o related to HD     Patient Active Problem List   Diagnosis    Anxiety    Astigmatism    Carotid bruit    Diabetes mellitus type 2, uncomplicated (Multi)    Coronary artery disease involving native coronary artery    Iron deficiency anemia    Migraine    Neuropathy    Transplant    Polyneuropathy due to type 2 diabetes mellitus (Multi)    Nuclear senile cataract of both eyes    Normocytic normochromic anemia    Low back pain    Hidradenitis    Complex dyslipidemia    Chronic heart failure with preserved ejection fraction (HFpEF)    Malnutrition of moderate degree (Multi)    Kidney transplant candidate    Nonrheumatic mitral valve regurgitation    Chronic deep vein thrombosis (DVT) of brachial vein of left upper extremity (Multi)    Acute diastolic CHF (congestive heart failure)    Hypertensive emergency    Resistant hypertension    Congestive heart failure    ESRD (end stage renal disease) on dialysis (Multi)    Flash pulmonary edema    Sleep-related breathing disorder    Congestive heart failure, unspecified HF chronicity, unspecified heart failure type    COPD exacerbation (Multi)       Scheduled medications:  acetaminophen, 975 mg, oral, q8h  ammonium lactate, , Topical, BID  apixaban, 5 mg, oral, BID  aspirin, 81 mg, oral, Daily  atorvastatin, 80 mg, oral, Nightly  bisacodyl, 10 mg, rectal, Daily  carvedilol, 25 mg, oral, BID  cloNIDine, 0.3 mg, oral, q8h BELEN  dilTIAZem CD, 360 mg, oral, Daily  fluticasone, 2 spray, Each Nostril, Daily  fluticasone furoate-vilanteroL, 1 puff, inhalation,  Daily  gabapentin, 300 mg, oral, Nightly  hydrALAZINE, 100 mg, oral, TID  insulin lispro, 0-10 Units, subcutaneous, TID AC  lidocaine, 1 patch, transdermal, Daily  metoclopramide, 5 mg, oral, q6h  oxygen, , inhalation, Continuous - Inhalation  perflutren lipid microspheres, 0.5-10 mL of dilution, intravenous, Once in imaging  polyethylene glycol, 17 g, oral, Daily  sennosides-docusate sodium, 1 tablet, oral, Nightly  sevelamer carbonate, 800 mg, oral, TID  torsemide, 20 mg, oral, 2 times per day on Sunday Tuesday Thursday Saturday  valsartan, 320 mg, oral, q PM  vitamin B complex-vitamin C-folic acid, 1 capsule, oral, Daily  white petrolatum, , Topical, BID         PRN medications: albuterol, diclofenac sodium, diphenhydrAMINE, ipratropium-albuteroL, lidocaine, ondansetron, pramoxine, sodium chloride, SUMAtriptan     Heart Rate:  [69-97]   Temp:  [36.2 °C (97.2 °F)-37 °C (98.6 °F)]   Resp:  [16-19]   BP: (148-195)/(72-92)   SpO2:  [95 %-99 %]    Weight: 55.3 kg (122 lb)   Gen: alert, NAD  HEENT: NC/AT  Neck: supple, no JVD   Pulm: clear ant b/l   CVS: RRR, no rub  Abd: S/NT/ND  LE: +edema , no cyanosis   Neuro: no asterixis   Dialysis acces:  RUEAVF     Lab Results   Component Value Date    WBC 7.3 01/10/2025    HGB 11.4 (L) 01/10/2025    HCT 36.6 01/10/2025    MCV 92 01/10/2025     01/10/2025     Lab Results   Component Value Date    GLUCOSE 236 (H) 01/10/2025    CALCIUM 10.1 01/10/2025     (L) 01/10/2025    K 6.3 (HH) 01/10/2025    CO2 28 01/10/2025    CL 92 (L) 01/10/2025    BUN 51 (H) 01/10/2025    CREATININE 5.40 (H) 01/10/2025     Results from last 72 hours   Lab Units 01/10/25  0809   ALBUMIN g/dL 3.9   GLUCOSE mg/dL 236*   HEMOGLOBIN g/dL 11.4*   WBC AUTO x10*3/uL 7.3      Results from last 72 hours   Lab Units 01/10/25  0809   SODIUM mmol/L 133*   POTASSIUM mmol/L 6.3*   CO2 mmol/L 28   BUN mg/dL 51*   CREATININE mg/dL 5.40*   PHOSPHORUS mg/dL 5.9*   CALCIUM mg/dL 10.1        Assessment    ESRD-HD admitted with SOB    Plan   Plan HD per submitted orders, UF  --> incerased to 2.5L as tolerated  MBD - Phosphorus binder: continue with Sevelamer   Anemia of CKD: EPO not needed   Access: no issues   BP: acceptable during treatment   Renal Diet   Daily renal MVI   Continue 3 x per week hemodialysis  Haydee Mosher MD MPH

## 2025-01-11 NOTE — NURSING NOTE
Report from Sending RN:    Report From: PONCHO Gama   Recent Surgery of Procedure: No  Baseline Level of Consciousness (LOC): A/O X 4  Oxygen Use: No  Type: N/A  Diabetic: Yes,   Last BP Med Given Day of Dialysis: yes, see EMR  Last Pain Med Given: yes, see EMAR  Lab Tests to be Obtained with Dialysis: No  Blood Transfusion to be Given During Dialysis: No  Available IV Access: No  Medications to be Administered During Dialysis: No  Continuous IV Infusion Running: No  Restraints on Currently or in the Last 24 Hours: No  Hand-Off Communication: full code, no isolation, pt can stand safely on a scale for a weight, travel be cart  Dialysis Catheter Dressing: N/A Fistula  Last Dressing Change: N/A.

## 2025-01-11 NOTE — HH CARE COORDINATION
Home Care received a Referral to Resume Care for Nursing, Physical Therapy, and Occupational Therapy. We have processed the referral for a Resumption of Care on 1/13/25.     If you have any questions or concerns, please feel free to contact us at [phone]. Follow the prompts, enter your five digit zip code, and you will be directed to your care team on [facility] 3.

## 2025-01-11 NOTE — NURSING NOTE
.Report from Sending RN:    Report From: PONCHO Vickers  Recent Surgery of Procedure: No  Baseline Level of Consciousness (LOC): A&O X3  Oxygen Use: No  Type: room air  Diabetic: YES, 236 mg/dl  Last BP Med Given Day of Dialysis: clonidine 0.3 mg  Last Pain Med Given: tylenol 975 mg  Lab Tests to be Obtained with Dialysis: No  Blood Transfusion to be Given During Dialysis: No  Available IV Access: Yes  Medications to be Administered During Dialysis: No  Continuous IV Infusion Running: Yes  Restraints on Currently or in the Last 24 Hours: No  Hand-Off Communication: Pt had no acute event overnight, SBP in 180's BP medication given. Not on precaution, travel by cart.  Dialysis Catheter Dressing: AVF  Last Dressing Change: N/A

## 2025-01-12 LAB
ALBUMIN SERPL BCP-MCNC: 3.5 G/DL (ref 3.4–5)
ANION GAP SERPL CALC-SCNC: 15 MMOL/L (ref 10–20)
BASOPHILS # BLD AUTO: 0.08 X10*3/UL (ref 0–0.1)
BASOPHILS NFR BLD AUTO: 1.4 %
BUN SERPL-MCNC: 41 MG/DL (ref 6–23)
CALCIUM SERPL-MCNC: 9.9 MG/DL (ref 8.6–10.6)
CHLORIDE SERPL-SCNC: 93 MMOL/L (ref 98–107)
CO2 SERPL-SCNC: 32 MMOL/L (ref 21–32)
CREAT SERPL-MCNC: 4.46 MG/DL (ref 0.5–1.05)
EGFRCR SERPLBLD CKD-EPI 2021: 11 ML/MIN/1.73M*2
EOSINOPHIL # BLD AUTO: 0.8 X10*3/UL (ref 0–0.7)
EOSINOPHIL NFR BLD AUTO: 13.6 %
ERYTHROCYTE [DISTWIDTH] IN BLOOD BY AUTOMATED COUNT: 15 % (ref 11.5–14.5)
GLUCOSE BLD MANUAL STRIP-MCNC: 182 MG/DL (ref 74–99)
GLUCOSE BLD MANUAL STRIP-MCNC: 186 MG/DL (ref 74–99)
GLUCOSE BLD MANUAL STRIP-MCNC: 190 MG/DL (ref 74–99)
GLUCOSE SERPL-MCNC: 189 MG/DL (ref 74–99)
HCT VFR BLD AUTO: 33.2 % (ref 36–46)
HGB BLD-MCNC: 10 G/DL (ref 12–16)
IMM GRANULOCYTES # BLD AUTO: 0.02 X10*3/UL (ref 0–0.7)
IMM GRANULOCYTES NFR BLD AUTO: 0.3 % (ref 0–0.9)
LYMPHOCYTES # BLD AUTO: 0.95 X10*3/UL (ref 1.2–4.8)
LYMPHOCYTES NFR BLD AUTO: 16.2 %
MCH RBC QN AUTO: 28.7 PG (ref 26–34)
MCHC RBC AUTO-ENTMCNC: 30.1 G/DL (ref 32–36)
MCV RBC AUTO: 95 FL (ref 80–100)
MONOCYTES # BLD AUTO: 0.63 X10*3/UL (ref 0.1–1)
MONOCYTES NFR BLD AUTO: 10.7 %
NEUTROPHILS # BLD AUTO: 3.39 X10*3/UL (ref 1.2–7.7)
NEUTROPHILS NFR BLD AUTO: 57.8 %
NRBC BLD-RTO: 0 /100 WBCS (ref 0–0)
PHOSPHATE SERPL-MCNC: 5.1 MG/DL (ref 2.5–4.9)
PLATELET # BLD AUTO: 237 X10*3/UL (ref 150–450)
POTASSIUM SERPL-SCNC: 5.1 MMOL/L (ref 3.5–5.3)
RBC # BLD AUTO: 3.48 X10*6/UL (ref 4–5.2)
SODIUM SERPL-SCNC: 135 MMOL/L (ref 136–145)
WBC # BLD AUTO: 5.9 X10*3/UL (ref 4.4–11.3)

## 2025-01-12 PROCEDURE — 2500000004 HC RX 250 GENERAL PHARMACY W/ HCPCS (ALT 636 FOR OP/ED): Performed by: STUDENT IN AN ORGANIZED HEALTH CARE EDUCATION/TRAINING PROGRAM

## 2025-01-12 PROCEDURE — 82947 ASSAY GLUCOSE BLOOD QUANT: CPT

## 2025-01-12 PROCEDURE — 2500000001 HC RX 250 WO HCPCS SELF ADMINISTERED DRUGS (ALT 637 FOR MEDICARE OP): Performed by: STUDENT IN AN ORGANIZED HEALTH CARE EDUCATION/TRAINING PROGRAM

## 2025-01-12 PROCEDURE — 36415 COLL VENOUS BLD VENIPUNCTURE: CPT | Performed by: STUDENT IN AN ORGANIZED HEALTH CARE EDUCATION/TRAINING PROGRAM

## 2025-01-12 PROCEDURE — 2500000002 HC RX 250 W HCPCS SELF ADMINISTERED DRUGS (ALT 637 FOR MEDICARE OP, ALT 636 FOR OP/ED): Performed by: STUDENT IN AN ORGANIZED HEALTH CARE EDUCATION/TRAINING PROGRAM

## 2025-01-12 PROCEDURE — 85025 COMPLETE CBC W/AUTO DIFF WBC: CPT | Performed by: STUDENT IN AN ORGANIZED HEALTH CARE EDUCATION/TRAINING PROGRAM

## 2025-01-12 PROCEDURE — 94640 AIRWAY INHALATION TREATMENT: CPT

## 2025-01-12 PROCEDURE — 2500000005 HC RX 250 GENERAL PHARMACY W/O HCPCS: Performed by: STUDENT IN AN ORGANIZED HEALTH CARE EDUCATION/TRAINING PROGRAM

## 2025-01-12 PROCEDURE — 1200000002 HC GENERAL ROOM WITH TELEMETRY DAILY

## 2025-01-12 PROCEDURE — 99231 SBSQ HOSP IP/OBS SF/LOW 25: CPT | Performed by: STUDENT IN AN ORGANIZED HEALTH CARE EDUCATION/TRAINING PROGRAM

## 2025-01-12 PROCEDURE — 84132 ASSAY OF SERUM POTASSIUM: CPT | Performed by: STUDENT IN AN ORGANIZED HEALTH CARE EDUCATION/TRAINING PROGRAM

## 2025-01-12 RX ADMIN — TORSEMIDE 20 MG: 20 TABLET ORAL at 20:20

## 2025-01-12 RX ADMIN — ATORVASTATIN CALCIUM 80 MG: 80 TABLET, FILM COATED ORAL at 20:20

## 2025-01-12 RX ADMIN — CLONIDINE HYDROCHLORIDE 0.3 MG: 0.1 TABLET ORAL at 14:34

## 2025-01-12 RX ADMIN — Medication: at 08:51

## 2025-01-12 RX ADMIN — CARVEDILOL 25 MG: 12.5 TABLET, FILM COATED ORAL at 08:48

## 2025-01-12 RX ADMIN — FLUTICASONE PROPIONATE 2 SPRAY: 50 SPRAY, METERED NASAL at 08:50

## 2025-01-12 RX ADMIN — SUMATRIPTAN 25 MG: 25 TABLET, FILM COATED ORAL at 20:20

## 2025-01-12 RX ADMIN — TORSEMIDE 20 MG: 20 TABLET ORAL at 08:48

## 2025-01-12 RX ADMIN — SEVELAMER CARBONATE 800 MG: 800 TABLET, FILM COATED ORAL at 12:20

## 2025-01-12 RX ADMIN — HYDRALAZINE HYDROCHLORIDE 100 MG: 50 TABLET ORAL at 08:48

## 2025-01-12 RX ADMIN — CLONIDINE HYDROCHLORIDE 0.3 MG: 0.1 TABLET ORAL at 05:42

## 2025-01-12 RX ADMIN — HYDRALAZINE HYDROCHLORIDE 100 MG: 50 TABLET ORAL at 14:34

## 2025-01-12 RX ADMIN — LIDOCAINE 4% 1 PATCH: 40 PATCH TOPICAL at 21:12

## 2025-01-12 RX ADMIN — METOCLOPRAMIDE 5 MG: 10 TABLET ORAL at 14:37

## 2025-01-12 RX ADMIN — INSULIN LISPRO 2 UNITS: 100 INJECTION, SOLUTION INTRAVENOUS; SUBCUTANEOUS at 18:00

## 2025-01-12 RX ADMIN — CARVEDILOL 25 MG: 12.5 TABLET, FILM COATED ORAL at 20:20

## 2025-01-12 RX ADMIN — METOCLOPRAMIDE 5 MG: 10 TABLET ORAL at 02:17

## 2025-01-12 RX ADMIN — INSULIN LISPRO 2 UNITS: 100 INJECTION, SOLUTION INTRAVENOUS; SUBCUTANEOUS at 12:55

## 2025-01-12 RX ADMIN — DIPHENHYDRAMINE HYDROCHLORIDE 50 MG: 25 CAPSULE ORAL at 10:24

## 2025-01-12 RX ADMIN — GABAPENTIN 300 MG: 300 CAPSULE ORAL at 20:20

## 2025-01-12 RX ADMIN — PETROLATUM: 420 OINTMENT TOPICAL at 08:51

## 2025-01-12 RX ADMIN — INSULIN LISPRO 2 UNITS: 100 INJECTION, SOLUTION INTRAVENOUS; SUBCUTANEOUS at 08:53

## 2025-01-12 RX ADMIN — HYDRALAZINE HYDROCHLORIDE 100 MG: 50 TABLET ORAL at 20:20

## 2025-01-12 RX ADMIN — VALSARTAN 320 MG: 160 TABLET, FILM COATED ORAL at 20:20

## 2025-01-12 RX ADMIN — SEVELAMER CARBONATE 800 MG: 800 TABLET, FILM COATED ORAL at 17:59

## 2025-01-12 RX ADMIN — PETROLATUM: 420 OINTMENT TOPICAL at 20:20

## 2025-01-12 RX ADMIN — Medication: at 20:20

## 2025-01-12 RX ADMIN — FLUTICASONE FUROATE AND VILANTEROL TRIFENATATE 1 PUFF: 100; 25 POWDER RESPIRATORY (INHALATION) at 09:38

## 2025-01-12 RX ADMIN — ACETAMINOPHEN 975 MG: 325 TABLET, FILM COATED ORAL at 17:59

## 2025-01-12 RX ADMIN — ACETAMINOPHEN 975 MG: 325 TABLET, FILM COATED ORAL at 02:17

## 2025-01-12 RX ADMIN — DILTIAZEM HYDROCHLORIDE 360 MG: 180 CAPSULE, COATED, EXTENDED RELEASE ORAL at 08:48

## 2025-01-12 RX ADMIN — ACETAMINOPHEN 975 MG: 325 TABLET, FILM COATED ORAL at 10:24

## 2025-01-12 RX ADMIN — RENO CAPS 1 CAPSULE: 100; 1.5; 1.7; 20; 10; 1; 150; 5; 6 CAPSULE ORAL at 08:48

## 2025-01-12 RX ADMIN — APIXABAN 5 MG: 5 TABLET, FILM COATED ORAL at 08:48

## 2025-01-12 RX ADMIN — METOCLOPRAMIDE 5 MG: 10 TABLET ORAL at 08:48

## 2025-01-12 RX ADMIN — METOCLOPRAMIDE 5 MG: 10 TABLET ORAL at 20:20

## 2025-01-12 RX ADMIN — APIXABAN 5 MG: 5 TABLET, FILM COATED ORAL at 20:20

## 2025-01-12 RX ADMIN — CLONIDINE HYDROCHLORIDE 0.3 MG: 0.1 TABLET ORAL at 21:12

## 2025-01-12 RX ADMIN — ASPIRIN 81 MG: 81 TABLET, COATED ORAL at 08:48

## 2025-01-12 RX ADMIN — SEVELAMER CARBONATE 800 MG: 800 TABLET, FILM COATED ORAL at 08:48

## 2025-01-12 ASSESSMENT — PAIN - FUNCTIONAL ASSESSMENT
PAIN_FUNCTIONAL_ASSESSMENT: 0-10
PAIN_FUNCTIONAL_ASSESSMENT: 0-10

## 2025-01-12 ASSESSMENT — PAIN SCALES - GENERAL
PAINLEVEL_OUTOF10: 1
PAINLEVEL_OUTOF10: 4
PAINLEVEL_OUTOF10: 1

## 2025-01-12 ASSESSMENT — PAIN DESCRIPTION - LOCATION: LOCATION: HEAD

## 2025-01-12 NOTE — CARE PLAN
The patient's goals for the shift include plan for HD tomorrow. Safety measures in place.     Problem: Discharge Planning  Goal: Discharge to home or other facility with appropriate resources  Outcome: Progressing

## 2025-01-12 NOTE — PROGRESS NOTES
Transitional Care Coordinator Progress Note:   Per Dr Ruff, ADOD is 1/14.  Awaiting pt to reach her dry weight. Plan for pt to discharge home with [facility] for RN, PT, OT and SW.  [facility] was informed of the ADOD.  Care coordinator will continue to follow for discharge planning needs.     Otilia Baker MSN, RN-BC  Transitional Care Coordinator (TCC)  [phone]

## 2025-01-12 NOTE — PROGRESS NOTES
Stacey Jackson is a 54 y.o. female on day 14 of admission presenting with Hypertensive emergency.      Subjective   NE       Objective     Last Recorded Vitals  /64   Pulse 62   Temp 36.6 °C (97.9 °F)   Resp 18   Wt 59.9 kg (132 lb)   SpO2 100%   Intake/Output last 3 Shifts:  No intake or output data in the 24 hours ending 01/12/25 1811      Admission Weight  Weight: 55.3 kg (122 lb) (12/29/24 1821)    Daily Weight  01/12/25 : 59.9 kg (132 lb)    Image Results  Transthoracic Echo (TTE) Limited     [facility], [address] [place] [address]                 Tel [phone] and Fax [phone]    TRANSTHORACIC ECHOCARDIOGRAM REPORT       Patient Name:       STACEY JACKSON      Reading Physician:    [id number] Mariposa Nicole MD  Study Date:         1/9/2025            Ordering Provider:    [id number] JAQUAN ZAMORA  MRN/PID:            [id number]            Fellow:  Accession#:         [id number]        Nurse:  Date of Birth/Age:  1970 / 54 years Sonographer:          DIANN SMITH  Gender assigned at  F                   Additional Staff:  Birth:  Height:                                 Admit Date:           12/29/2024  Weight:                                 Admission Status:     Inpatient -                                                                Routine  BSA / BMI:          m2 / kg/m2          Encounter#:           [id number]  Blood Pressure:     /                   Department Location:  [facility]    Study Type:    TRANSTHORACIC ECHO (TTE) LIMITED  Diagnosis/ICD: Encounter for screening for cardiovascular disorders-Z13.6  Indication:    LVOT gradient  CPT Code:      Echo Limited-50434; Doppler Limited-38597; Color Doppler-63519   Study Detail: The following Echo studies were performed: 2D, Doppler and color                flow.       PHYSICIAN  INTERPRETATION:  Left Ventricle: The left ventricle was not well visualized. The left ventricular ejection fraction could not be measured. The left ventricular cavity size is normal. Left ventricular diastolic filling was not assessed.  Left Atrium: The left atrium is mildly dilated.  Right Ventricle: The right ventricle is normal in size. Unable to determine right ventricular systolic function.  Right Atrium: The right atrium is normal in size.  Aortic Valve: The aortic valve was not assessed. Aortic valve regurgitation was not assessed.  Mitral Valve: The mitral valve is normal in structure. There is trace mitral valve regurgitation.  Tricuspid Valve: The tricuspid valve is structurally normal. Tricuspid regurgitation was not assessed.  Pulmonic Valve: The pulmonic valve was not assessed. Pulmonic valve regurgitation was not assessed.  Pericardium: Pericardial effusion was not assessed.  Aorta: The aortic root was not assessed.       CONCLUSIONS:   1. The left ventricle was not well visualized. The left ventricular ejection fraction could not be measured.   2. Unable to determine right ventricular systolic function.   3. The left atrium is mildly dilated.   4. Limited fellow echo.    QUANTITATIVE DATA SUMMARY:     AORTIC VALVE:          Normal Ranges:  LVOT Max Sim: 0.93 m/s (<=1.1m/s)  LVOT VTI:     17.00 cm       [id number] Mariposa Nicole MD  Electronically signed on 1/10/2025 at 2:26:11 PM       ** Final **      Physical Exam    Constitutional:       Appearance: Normal appearance.   Cardiovascular:      Rate and Rhythm: Regular rhythm.      Pulses: Normal pulses.     Pulmonary:      Effort: Pulmonary effort is normal.      Breath sounds: Normal breath sounds.   Abdominal:      General: Abdomen is flat. Bowel sounds are normal.   Relevant Results               Assessment/Plan                  Assessment & Plan  Flash pulmonary edema    ESRD (end stage renal disease) on dialysis (Multi)    Hypertensive emergency        Stacey Heath is a 54 y.o. female PMH significant for ESRD 2/2 on dialysis T/Th/Sat via RUE AVF (last complete session 01/04/2025), HTN, HFpEF, COPD, brachial DVT on Eliquis who presented for [facility] ED for acute onset shortness of breath. Pt who was admitted to the MICU on 12/29/24 for hypertensive emergency c/b flash pulmonary edema. She was treated with cardene gtt and received UF and HD with nephrology. Got BP below 180 with cardene drip. cardene gtt was weaned off and patient resume home anti-HTN regimen on the floor. Rapid response 1/5 for symptomatic hypertension (headache) 272/82 and patient admitted to CICU. Upon arrival to the CICU, pt was hypertensive to the 270s/82, started her on clevidipine and continued her home anti-hypertensive meds. She reports having severe headache which has now improved and fatigue, but denies any vision changes, chest pain, SOB, palpitations or dizziness.  Pt has had multiple iHD and UF sessions done in the ICU for volume control. Increased hydralazine from 50mg TID to 100mg TID and Diltiazem to 360mg daily from 240mg daily. Patient with improved BP and off of clevidipine gtt 1/8. Stable for transfer to the floor 1/9. HDS      1/12  -Pt had uneventful night,..  -planning for HD tomorrow with Renal team.  Awaiting pt to reach her dry weight.  -HHC requested. TCC was updated.   -Goal is to get to around dw of 51 kg.still around 59 today and she's only 50 kg person; being 9 kg     1/11  -Patient with episodes of hypotension during dialysis, hold hydralazine and clonidine prior to dialysis so patient can tolerate fluid removal. Will continue torsemide given patient reports urine production.  -C/w current antihypertensives: Coreg 25mg bid, clonidine 0.3mg q8h, diltiazem 360mg daily, hydralazine 100mg TID, valsartan 320mg daily  -Can trial minoxidil 2.5 on non-dialysis days   -Nephrology following.   -Lab reviewed   -still hypertensive, above DW (59 kg, DW should be around 50).   Reviewed echo with cardiology, diastolic dysfunction, LV is tiny.    -Renal to have a discussion with her and her daughter about what their goals are, she keeps doing this she will need a heart-kidney transplant soon   -HD sessions today and yesterday        Hypertensive emergency  HTN  Acute on chronic HFpEF (ef 60-65%, 4/3/24)  HLD  CAD   ESRD   T2DM  Polyneuropathy    COPD   GERD   DVT  -continue with Eliquis 5mg BID            Code: Full Code    HPOA: Rohini Piña (Child)  [phone] (Mobile     30 min             Soniya Ruff MD

## 2025-01-13 ENCOUNTER — APPOINTMENT (OUTPATIENT)
Dept: DIALYSIS | Facility: HOSPITAL | Age: 55
End: 2025-01-13
Payer: COMMERCIAL

## 2025-01-13 ENCOUNTER — DOCUMENTATION (OUTPATIENT)
Dept: BEHAVIORAL HEALTH | Facility: CLINIC | Age: 55
End: 2025-01-13

## 2025-01-13 LAB
ALBUMIN SERPL BCP-MCNC: 3.5 G/DL (ref 3.4–5)
ANION GAP SERPL CALC-SCNC: 22 MMOL/L (ref 10–20)
BASOPHILS # BLD AUTO: 0.08 X10*3/UL (ref 0–0.1)
BASOPHILS NFR BLD AUTO: 1.3 %
BUN SERPL-MCNC: 69 MG/DL (ref 6–23)
CALCIUM SERPL-MCNC: 9.2 MG/DL (ref 8.6–10.6)
CHLORIDE SERPL-SCNC: 98 MMOL/L (ref 98–107)
CO2 SERPL-SCNC: 21 MMOL/L (ref 21–32)
CREAT SERPL-MCNC: 6.24 MG/DL (ref 0.5–1.05)
EGFRCR SERPLBLD CKD-EPI 2021: 8 ML/MIN/1.73M*2
EOSINOPHIL # BLD AUTO: 0.78 X10*3/UL (ref 0–0.7)
EOSINOPHIL NFR BLD AUTO: 12.4 %
ERYTHROCYTE [DISTWIDTH] IN BLOOD BY AUTOMATED COUNT: 15.1 % (ref 11.5–14.5)
GLUCOSE BLD MANUAL STRIP-MCNC: 115 MG/DL (ref 74–99)
GLUCOSE BLD MANUAL STRIP-MCNC: 149 MG/DL (ref 74–99)
GLUCOSE BLD MANUAL STRIP-MCNC: 164 MG/DL (ref 74–99)
GLUCOSE SERPL-MCNC: 192 MG/DL (ref 74–99)
HCT VFR BLD AUTO: 35.9 % (ref 36–46)
HGB BLD-MCNC: 10.1 G/DL (ref 12–16)
IMM GRANULOCYTES # BLD AUTO: 0.02 X10*3/UL (ref 0–0.7)
IMM GRANULOCYTES NFR BLD AUTO: 0.3 % (ref 0–0.9)
LYMPHOCYTES # BLD AUTO: 0.99 X10*3/UL (ref 1.2–4.8)
LYMPHOCYTES NFR BLD AUTO: 15.8 %
MCH RBC QN AUTO: 29.2 PG (ref 26–34)
MCHC RBC AUTO-ENTMCNC: 28.1 G/DL (ref 32–36)
MCV RBC AUTO: 104 FL (ref 80–100)
MONOCYTES # BLD AUTO: 0.62 X10*3/UL (ref 0.1–1)
MONOCYTES NFR BLD AUTO: 9.9 %
NEUTROPHILS # BLD AUTO: 3.78 X10*3/UL (ref 1.2–7.7)
NEUTROPHILS NFR BLD AUTO: 60.3 %
NRBC BLD-RTO: 0 /100 WBCS (ref 0–0)
PHOSPHATE SERPL-MCNC: 5.2 MG/DL (ref 2.5–4.9)
PLATELET # BLD AUTO: 207 X10*3/UL (ref 150–450)
POTASSIUM SERPL-SCNC: 6.5 MMOL/L (ref 3.5–5.3)
RBC # BLD AUTO: 3.46 X10*6/UL (ref 4–5.2)
SODIUM SERPL-SCNC: 134 MMOL/L (ref 136–145)
WBC # BLD AUTO: 6.3 X10*3/UL (ref 4.4–11.3)

## 2025-01-13 PROCEDURE — 2500000001 HC RX 250 WO HCPCS SELF ADMINISTERED DRUGS (ALT 637 FOR MEDICARE OP): Performed by: STUDENT IN AN ORGANIZED HEALTH CARE EDUCATION/TRAINING PROGRAM

## 2025-01-13 PROCEDURE — 2500000004 HC RX 250 GENERAL PHARMACY W/ HCPCS (ALT 636 FOR OP/ED): Performed by: STUDENT IN AN ORGANIZED HEALTH CARE EDUCATION/TRAINING PROGRAM

## 2025-01-13 PROCEDURE — 2500000002 HC RX 250 W HCPCS SELF ADMINISTERED DRUGS (ALT 637 FOR MEDICARE OP, ALT 636 FOR OP/ED): Performed by: STUDENT IN AN ORGANIZED HEALTH CARE EDUCATION/TRAINING PROGRAM

## 2025-01-13 PROCEDURE — 99233 SBSQ HOSP IP/OBS HIGH 50: CPT | Performed by: INTERNAL MEDICINE

## 2025-01-13 PROCEDURE — 2500000005 HC RX 250 GENERAL PHARMACY W/O HCPCS: Performed by: STUDENT IN AN ORGANIZED HEALTH CARE EDUCATION/TRAINING PROGRAM

## 2025-01-13 PROCEDURE — 80069 RENAL FUNCTION PANEL: CPT | Performed by: STUDENT IN AN ORGANIZED HEALTH CARE EDUCATION/TRAINING PROGRAM

## 2025-01-13 PROCEDURE — 82947 ASSAY GLUCOSE BLOOD QUANT: CPT

## 2025-01-13 PROCEDURE — 8010000001 HC DIALYSIS - HEMODIALYSIS PER DAY

## 2025-01-13 PROCEDURE — 99231 SBSQ HOSP IP/OBS SF/LOW 25: CPT | Performed by: STUDENT IN AN ORGANIZED HEALTH CARE EDUCATION/TRAINING PROGRAM

## 2025-01-13 PROCEDURE — 1200000002 HC GENERAL ROOM WITH TELEMETRY DAILY

## 2025-01-13 PROCEDURE — 36415 COLL VENOUS BLD VENIPUNCTURE: CPT | Performed by: STUDENT IN AN ORGANIZED HEALTH CARE EDUCATION/TRAINING PROGRAM

## 2025-01-13 PROCEDURE — 85025 COMPLETE CBC W/AUTO DIFF WBC: CPT | Performed by: STUDENT IN AN ORGANIZED HEALTH CARE EDUCATION/TRAINING PROGRAM

## 2025-01-13 RX ORDER — TRAZODONE HYDROCHLORIDE 50 MG/1
25 TABLET ORAL NIGHTLY PRN
Status: DISCONTINUED | OUTPATIENT
Start: 2025-01-13 | End: 2025-01-14

## 2025-01-13 RX ADMIN — CARVEDILOL 25 MG: 12.5 TABLET, FILM COATED ORAL at 12:32

## 2025-01-13 RX ADMIN — ACETAMINOPHEN 975 MG: 325 TABLET, FILM COATED ORAL at 12:30

## 2025-01-13 RX ADMIN — POLYETHYLENE GLYCOL 3350 17 G: 17 POWDER, FOR SOLUTION ORAL at 12:34

## 2025-01-13 RX ADMIN — ATORVASTATIN CALCIUM 80 MG: 80 TABLET, FILM COATED ORAL at 20:29

## 2025-01-13 RX ADMIN — APIXABAN 5 MG: 5 TABLET, FILM COATED ORAL at 20:29

## 2025-01-13 RX ADMIN — SEVELAMER CARBONATE 800 MG: 800 TABLET, FILM COATED ORAL at 12:33

## 2025-01-13 RX ADMIN — TRAZODONE HYDROCHLORIDE 25 MG: 50 TABLET ORAL at 20:31

## 2025-01-13 RX ADMIN — HYDRALAZINE HYDROCHLORIDE 100 MG: 50 TABLET ORAL at 14:34

## 2025-01-13 RX ADMIN — GABAPENTIN 300 MG: 300 CAPSULE ORAL at 20:29

## 2025-01-13 RX ADMIN — ASPIRIN 81 MG: 81 TABLET, COATED ORAL at 12:33

## 2025-01-13 RX ADMIN — METOCLOPRAMIDE 5 MG: 10 TABLET ORAL at 12:34

## 2025-01-13 RX ADMIN — METOCLOPRAMIDE 5 MG: 10 TABLET ORAL at 17:59

## 2025-01-13 RX ADMIN — ACETAMINOPHEN 975 MG: 325 TABLET, FILM COATED ORAL at 22:44

## 2025-01-13 RX ADMIN — RENO CAPS 1 CAPSULE: 100; 1.5; 1.7; 20; 10; 1; 150; 5; 6 CAPSULE ORAL at 12:41

## 2025-01-13 RX ADMIN — FLUTICASONE PROPIONATE 2 SPRAY: 50 SPRAY, METERED NASAL at 12:35

## 2025-01-13 RX ADMIN — DILTIAZEM HYDROCHLORIDE 360 MG: 180 CAPSULE, COATED, EXTENDED RELEASE ORAL at 12:33

## 2025-01-13 RX ADMIN — CARVEDILOL 25 MG: 12.5 TABLET, FILM COATED ORAL at 20:29

## 2025-01-13 RX ADMIN — METOCLOPRAMIDE 5 MG: 10 TABLET ORAL at 02:17

## 2025-01-13 RX ADMIN — Medication: at 12:35

## 2025-01-13 RX ADMIN — VALSARTAN 320 MG: 160 TABLET, FILM COATED ORAL at 22:43

## 2025-01-13 RX ADMIN — CLONIDINE HYDROCHLORIDE 0.3 MG: 0.1 TABLET ORAL at 05:12

## 2025-01-13 RX ADMIN — LIDOCAINE 4% 1 PATCH: 40 PATCH TOPICAL at 12:34

## 2025-01-13 RX ADMIN — PETROLATUM: 420 OINTMENT TOPICAL at 12:42

## 2025-01-13 RX ADMIN — SEVELAMER CARBONATE 800 MG: 800 TABLET, FILM COATED ORAL at 17:59

## 2025-01-13 RX ADMIN — PETROLATUM: 420 OINTMENT TOPICAL at 20:32

## 2025-01-13 RX ADMIN — APIXABAN 5 MG: 5 TABLET, FILM COATED ORAL at 12:32

## 2025-01-13 RX ADMIN — ACETAMINOPHEN 975 MG: 325 TABLET, FILM COATED ORAL at 02:17

## 2025-01-13 RX ADMIN — CLONIDINE HYDROCHLORIDE 0.3 MG: 0.1 TABLET ORAL at 22:43

## 2025-01-13 RX ADMIN — CLONIDINE HYDROCHLORIDE 0.3 MG: 0.1 TABLET ORAL at 14:34

## 2025-01-13 RX ADMIN — HYDRALAZINE HYDROCHLORIDE 100 MG: 50 TABLET ORAL at 20:29

## 2025-01-13 RX ADMIN — Medication: at 20:32

## 2025-01-13 ASSESSMENT — PAIN SCALES - GENERAL
PAINLEVEL_OUTOF10: 0 - NO PAIN
PAINLEVEL_OUTOF10: 2
PAINLEVEL_OUTOF10: 0 - NO PAIN
PAINLEVEL_OUTOF10: 0 - NO PAIN
PAINLEVEL_OUTOF10: 6
PAINLEVEL_OUTOF10: 5 - MODERATE PAIN

## 2025-01-13 ASSESSMENT — COGNITIVE AND FUNCTIONAL STATUS - GENERAL
DAILY ACTIVITIY SCORE: 24
MOBILITY SCORE: 24

## 2025-01-13 ASSESSMENT — PAIN - FUNCTIONAL ASSESSMENT
PAIN_FUNCTIONAL_ASSESSMENT: 0-10
PAIN_FUNCTIONAL_ASSESSMENT: NO/DENIES PAIN

## 2025-01-13 ASSESSMENT — PAIN DESCRIPTION - LOCATION: LOCATION: HEAD

## 2025-01-13 NOTE — NURSING NOTE
Report from Sending RN:    Report From: Melvin  Recent Surgery of Procedure: No  Baseline Level of Consciousness (LOC): a / o x 4  Oxygen Use: No  Type: ra  Diabetic: Yes  Last BP Med Given Day of Dialysis: None  Last Pain Med Given: 1200 Tylenol  Lab Tests to be Obtained with Dialysis: No  Blood Transfusion to be Given During Dialysis: No  Available IV Access: Yes  Medications to be Administered During Dialysis: No  Continuous IV Infusion Running: No  Restraints on Currently or in the Last 24 Hours: No  Hand-Off Communication: No events overnight, able to come to dialysis  Dialysis Catheter Dressing: AVF  Last Dressing Change: AVF

## 2025-01-13 NOTE — PROGRESS NOTES
"PSYCHIATRY CONSULT-LIAISON PROGRESS NOTE    SUBJECTIVE  The patient was seen after dialysis this morning. She is pleasant and engaged in the interview. She says that she had trouble sleeping last night. She had a vivid dream in which she thought she was walking outside without her clothes on. She has been having difficulty sleeping over the last couple weeks, only getting 3-4 hrs interrupted. She denies any sleep walking or acting out her dreams. Over the last two weeks she has also been seeing people in her room who aren't actually there. She says they are non-threatening and do not command her to do anything. For example, yesterday she thought she was talking to an aide in her room but her  who was visiting told her that there was no one else in the room.     ==========  Additional information: n/a     OBJECTIVE    VITALS      1/12/2025     7:52 AM 1/12/2025     2:36 PM 1/12/2025     8:18 PM 1/13/2025     7:34 AM 1/13/2025     7:37 AM 1/13/2025    10:39 AM 1/13/2025    12:16 PM   Vitals   Systolic 187 159 169    176   Diastolic 79 64 64    79   BP Location       Right arm   Heart Rate 64 62 70  79 63 90   Temp 36.5 °C (97.7 °F) 36.6 °C (97.9 °F) 36.5 °C (97.7 °F) 36 °C (96.8 °F)  36.5 °C (97.7 °F)    Resp 18 18 18    17        MENTAL STATUS EXAM  General: Female sitting on bed comfortably in no acute distress. Eating breakfast.   Appearance: Appears stated age and Casually dressed  Attitude/Behavior: Cooperative, conversant, engaged, and with good eye contact.  Motor Activity: No psychomotor agitation or retardation. No abnormal movements, tremors or tics  Speech: Normal rate, tone and rhythm and coherent speech  Mood: \"Good\"   Affect: Euthymic, full-range, bright, congruent with mood  Thought Process: Linear, goal directed  Thought Content: Appropriate with no SI or HI. No perseverations or obsessions identified  Perception: No perceptual abnormalities noted and Does not appear to be internally " stimulated  Cognition: Alert and oriented x4 to person, place, time, and situation  Insight: Fair, in regards to understanding mental health condition  Judgement: Fair    CURRENT MEDICATIONS  Scheduled medications  acetaminophen, 975 mg, oral, q8h  ammonium lactate, , Topical, BID  apixaban, 5 mg, oral, BID  aspirin, 81 mg, oral, Daily  atorvastatin, 80 mg, oral, Nightly  bisacodyl, 10 mg, rectal, Daily  carvedilol, 25 mg, oral, BID  cloNIDine, 0.3 mg, oral, q8h BELEN  dilTIAZem CD, 360 mg, oral, Daily  fluticasone, 2 spray, Each Nostril, Daily  fluticasone furoate-vilanteroL, 1 puff, inhalation, Daily  gabapentin, 300 mg, oral, Nightly  hydrALAZINE, 100 mg, oral, TID  insulin lispro, 0-10 Units, subcutaneous, TID AC  lidocaine, 1 patch, transdermal, Daily  metoclopramide, 5 mg, oral, q6h  oxygen, , inhalation, Continuous - Inhalation  perflutren lipid microspheres, 0.5-10 mL of dilution, intravenous, Once in imaging  polyethylene glycol, 17 g, oral, Daily  sennosides-docusate sodium, 1 tablet, oral, Nightly  sevelamer carbonate, 800 mg, oral, TID  torsemide, 20 mg, oral, 2 times per day on Sunday Tuesday Thursday Saturday  valsartan, 320 mg, oral, q PM  vitamin B complex-vitamin C-folic acid, 1 capsule, oral, Daily  white petrolatum, , Topical, BID        Continuous medications       PRN medications  PRN medications: albuterol, diclofenac sodium, diphenhydrAMINE, ipratropium-albuteroL, lidocaine, ondansetron, pramoxine, sodium chloride, SUMAtriptan     LABS  Results for orders placed or performed during the hospital encounter of 12/29/24 (from the past 24 hours)   POCT GLUCOSE   Result Value Ref Range    POCT Glucose 186 (H) 74 - 99 mg/dL   CBC and Auto Differential   Result Value Ref Range    WBC 6.3 4.4 - 11.3 x10*3/uL    nRBC 0.0 0.0 - 0.0 /100 WBCs    RBC 3.46 (L) 4.00 - 5.20 x10*6/uL    Hemoglobin 10.1 (L) 12.0 - 16.0 g/dL    Hematocrit 35.9 (L) 36.0 - 46.0 %     (H) 80 - 100 fL    MCH 29.2 26.0 - 34.0 pg     MCHC 28.1 (L) 32.0 - 36.0 g/dL    RDW 15.1 (H) 11.5 - 14.5 %    Platelets 207 150 - 450 x10*3/uL    Neutrophils % 60.3 40.0 - 80.0 %    Immature Granulocytes %, Automated 0.3 0.0 - 0.9 %    Lymphocytes % 15.8 13.0 - 44.0 %    Monocytes % 9.9 2.0 - 10.0 %    Eosinophils % 12.4 0.0 - 6.0 %    Basophils % 1.3 0.0 - 2.0 %    Neutrophils Absolute 3.78 1.20 - 7.70 x10*3/uL    Immature Granulocytes Absolute, Automated 0.02 0.00 - 0.70 x10*3/uL    Lymphocytes Absolute 0.99 (L) 1.20 - 4.80 x10*3/uL    Monocytes Absolute 0.62 0.10 - 1.00 x10*3/uL    Eosinophils Absolute 0.78 (H) 0.00 - 0.70 x10*3/uL    Basophils Absolute 0.08 0.00 - 0.10 x10*3/uL   Renal Function Panel   Result Value Ref Range    Glucose 192 (H) 74 - 99 mg/dL    Sodium 134 (L) 136 - 145 mmol/L    Potassium 6.5 (HH) 3.5 - 5.3 mmol/L    Chloride 98 98 - 107 mmol/L    Bicarbonate 21 21 - 32 mmol/L    Anion Gap 22 (H) 10 - 20 mmol/L    Urea Nitrogen 69 (H) 6 - 23 mg/dL    Creatinine 6.24 (H) 0.50 - 1.05 mg/dL    eGFR 8 (L) >60 mL/min/1.73m*2    Calcium 9.2 8.6 - 10.6 mg/dL    Phosphorus 5.2 (H) 2.5 - 4.9 mg/dL    Albumin 3.5 3.4 - 5.0 g/dL   POCT GLUCOSE   Result Value Ref Range    POCT Glucose 115 (H) 74 - 99 mg/dL     *Note: Due to a large number of results and/or encounters for the requested time period, some results have not been displayed. A complete set of results can be found in Results Review.        IMAGING  No results found.     PSYCHIATRIC RISK ASSESSMENT  Acute Risk of Harm to Others is Considered: Low  Acute Risk of Harm to Self is Considered: Low    ASSESSMENT AND PLAN  Stacey Heath is a 54 y.o. 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 Psychiatry was consulted on 1/10 for concern for  "hallucinations.     On initial assessment, patient is alert and oriented x4 to person, place, time and situation. Description of experience that was concerning for hallucinations likely related to current insomnia during this admission. Suspect that they are likely vivid dreams/nightmares. Discussed sleep hygiene and offered potential medication management of insomnia. Patient and her daughter are not interested in medication changes at this time.        Updates 1/13: The patient has been having difficulty sleeping for the last 2 weeks. She was previously not interested in trying any new medications but is now agreeable to trying a medication to help her sleep. Recommend starting trazodone 25mg at bedtime PRN.    IMPRESSION  #Insomnia  #Nightmares     RECOMMENDATIONS  Safety:  - Patient does not currently meet criteria for inpatient psychiatric admission. - To evaluate decision-making capacity, recommend use of the Capacity Evaluation Tool. Search “[facility] Capacity Evaluation under SmartText\"   - Patient does not require a 1:1 sitter from a psychiatric perspective at this time.  - Defer to primary team decision for 1:1 sitter.  - As with all hospitalized patients, would recommend delirium precautions, as below.     Work-up:  -EKG (1/6/2025): QTc Fredericia of 400ms     Medications:  - START trazodone 25mg PO at bedtime PRN insomnia.      Follow-up:  - If there are ongoing sleep concerns or patient continues to endorse insomnia, recommend close follow-up with her PCP.     - Discussed recommendations with primary team.  - Psychiatry will continue to follow.      Please page [phone] with any questions or concerns.    Medication Consent  Medication Consent: n/a; consult service     RESIDENT ATTESTATION:  Patient discussed with Dr. Connolly, who agrees with above plan which I have reviewed/edited.     Hazel Almaraz MD     "

## 2025-01-13 NOTE — CARE PLAN
Problem: Discharge Planning  Goal: Discharge to home or other facility with appropriate resources  1/13/2025 1529 by Lanette Joyce RN  Outcome: Progressing  1/13/2025 1528 by Lanette Joyce RN  Outcome: Progressing     Problem: Nutrition  Goal: Consume prescribed supplement  1/13/2025 1529 by Lanette Joyce RN  Outcome: Progressing  1/13/2025 1528 by Lanette Joyce RN  Outcome: Progressing  1/13/2025 1528 by Lanette Joyce RN  Reactivated

## 2025-01-13 NOTE — PROGRESS NOTES
Transitional Care Coordination Progress Note:  Patient discussed during interdisciplinary rounds.  Team members present: AGUSTIN TANG  Plan per Medical/Surgical team: Plan for one more session of dialysis for pt to achieve dry weight.  Payer: [facility] [employer] [facility]  Status: Inpatient  Discharge disposition: [facility] for RN/LPN (assessment/med compliance), PT/OT, and SW services.   Potential Barriers: none  ADOD: 1/14  Care coordinator will continue to follow for discharge planning needs.     Dorothy Ramos RN  Transitional Care Coordinator (TCC)  [phone] or [phone]

## 2025-01-13 NOTE — PROGRESS NOTES
Stacey Jackson is a 54 y.o. female on day 15 of admission presenting with Hypertensive emergency.      Subjective   Pt had uneventful night       Objective     Last Recorded Vitals  BP (!) 184/78   Pulse 90   Temp 35.9 °C (96.6 °F)   Resp 18   Wt 59.9 kg (132 lb)   SpO2 97%   Intake/Output last 3 Shifts:    Intake/Output Summary (Last 24 hours) at 1/13/2025 1619  Last data filed at 1/13/2025 1039  Gross per 24 hour   Intake 1200 ml   Output 2900 ml   Net -1700 ml         Admission Weight  Weight: 55.3 kg (122 lb) (12/29/24 1821)    Daily Weight  01/12/25 : 59.9 kg (132 lb)    Image Results  Transthoracic Echo (TTE) Limited     [facility], [address] [place] [address]                 Tel [phone] and Fax [phone]    TRANSTHORACIC ECHOCARDIOGRAM REPORT       Patient Name:       STACEY JACKSON      Reading Physician:    [id number] Mariposa Nicole MD  Study Date:         1/9/2025            Ordering Provider:    [id number] JAQUAN ZAMORA  MRN/PID:            [id number]            Fellow:  Accession#:         [id number]        Nurse:  Date of Birth/Age:  1970 / 54 years Sonographer:          DIANN SMITH  Gender assigned at  F                   Additional Staff:  Birth:  Height:                                 Admit Date:           12/29/2024  Weight:                                 Admission Status:     Inpatient -                                                                Routine  BSA / BMI:          m2 / kg/m2          Encounter#:           [id number]  Blood Pressure:     /                   Department Location:  [facility]    Study Type:    TRANSTHORACIC ECHO (TTE) LIMITED  Diagnosis/ICD: Encounter for screening for cardiovascular disorders-Z13.6  Indication:    LVOT gradient  CPT Code:      Echo Limited-09664; Doppler Limited-67514; Color  Doppler-23715   Study Detail: The following Echo studies were performed: 2D, Doppler and color                flow.       PHYSICIAN INTERPRETATION:  Left Ventricle: The left ventricle was not well visualized. The left ventricular ejection fraction could not be measured. The left ventricular cavity size is normal. Left ventricular diastolic filling was not assessed.  Left Atrium: The left atrium is mildly dilated.  Right Ventricle: The right ventricle is normal in size. Unable to determine right ventricular systolic function.  Right Atrium: The right atrium is normal in size.  Aortic Valve: The aortic valve was not assessed. Aortic valve regurgitation was not assessed.  Mitral Valve: The mitral valve is normal in structure. There is trace mitral valve regurgitation.  Tricuspid Valve: The tricuspid valve is structurally normal. Tricuspid regurgitation was not assessed.  Pulmonic Valve: The pulmonic valve was not assessed. Pulmonic valve regurgitation was not assessed.  Pericardium: Pericardial effusion was not assessed.  Aorta: The aortic root was not assessed.       CONCLUSIONS:   1. The left ventricle was not well visualized. The left ventricular ejection fraction could not be measured.   2. Unable to determine right ventricular systolic function.   3. The left atrium is mildly dilated.   4. Limited fellow echo.    QUANTITATIVE DATA SUMMARY:     AORTIC VALVE:          Normal Ranges:  LVOT Max Sim: 0.93 m/s (<=1.1m/s)  LVOT VTI:     17.00 cm       [id number] Mariposa Nicole MD  Electronically signed on 1/10/2025 at 2:26:11 PM       ** Final **      Physical Exam    Constitutional:       Appearance: Normal appearance.   Cardiovascular:      Rate and Rhythm: Regular rhythm.      Pulses: Normal pulses.     Pulmonary:      Effort: Pulmonary effort is normal.      Breath sounds: Normal breath sounds.   Abdominal:      General: Abdomen is flat. Bowel sounds are normal.   Relevant Results               Assessment/Plan                   Assessment & Plan  Flash pulmonary edema    ESRD (end stage renal disease) on dialysis (Multi)    Hypertensive emergency       Stacey Heath is a 54 y.o. female PMH significant for ESRD 2/2 on dialysis T/Th/Sat via RUE AVF, HTN, HFpEF, COPD, brachial DVT on Eliquis who presented for [facility] ED for acute onset shortness of breath. Pt who was admitted to the MICU on 12/29/24 for hypertensive emergency c/b flash pulmonary edema. She was treated with cardene gtt and received UF and HD with nephrology. Pt has had multiple iHD and UF sessions done in the ICU for volume control. Increased hydralazine from 50mg TID to 100mg TID and Diltiazem to 360mg daily from 240mg daily. Patient with improved BP and off of drips 1/8. Stable for transfer to the floor 1/9.  Renal team requested to keep inpatient until she reaches her dry weight, goal is to get her to around dw of 51 kg, she is still around 59 and she's only 50 kg person. Plan to keep with HD for two more sessions this coming week. Please follow up with her nephrologist before discharge.  -C requested. TCC was updated.        1/11  -Patient with episodes of hypotension during dialysis, hold hydralazine and clonidine prior to dialysis so patient can tolerate fluid removal. Will continue torsemide given patient reports urine production.  -C/w current antihypertensives: Coreg 25mg bid, clonidine 0.3mg q8h, diltiazem 360mg daily, hydralazine 100mg TID, valsartan 320mg daily  -Can trial minoxidil 2.5 on non-dialysis days   -Nephrology following.   -Lab reviewed   -still hypertensive, above DW (59 kg, DW should be around 50).  Reviewed echo with cardiology, diastolic dysfunction, LV is tiny.    -Renal to have a discussion with her and her daughter about what their goals are, she keeps doing this she will need a heart-kidney transplant soon            #Hypertensive emergency  #HTN  #Acute on chronic HFpEF (ef 60-65%, 4/3/24)  #HLD  #CAD   #ESRD    #T2DM  #Polyneuropathy    #COPD   #GERD   #DVT  -continue with Eliquis 5mg BID            Code: Full Code    HPOA: Rohini Piña (Child)  [phone] (Mobile     30 min             Soniya Ruff MD

## 2025-01-13 NOTE — NURSING NOTE
Pt refusing to have prescribed UF of 2-3 L removed. Pt reports she is at her EDW and removing more fluid will make her sick. Requests to just have blood cleaned today.

## 2025-01-14 ENCOUNTER — APPOINTMENT (OUTPATIENT)
Dept: DIALYSIS | Facility: HOSPITAL | Age: 55
End: 2025-01-14
Payer: COMMERCIAL

## 2025-01-14 ENCOUNTER — DOCUMENTATION (OUTPATIENT)
Dept: BEHAVIORAL HEALTH | Facility: CLINIC | Age: 55
End: 2025-01-14

## 2025-01-14 LAB
ALBUMIN SERPL BCP-MCNC: 3.4 G/DL (ref 3.4–5)
ANION GAP SERPL CALC-SCNC: 19 MMOL/L (ref 10–20)
BASOPHILS # BLD AUTO: 0.08 X10*3/UL (ref 0–0.1)
BASOPHILS NFR BLD AUTO: 1 %
BUN SERPL-MCNC: 54 MG/DL (ref 6–23)
CALCIUM SERPL-MCNC: 9.5 MG/DL (ref 8.6–10.6)
CHLORIDE SERPL-SCNC: 95 MMOL/L (ref 98–107)
CO2 SERPL-SCNC: 30 MMOL/L (ref 21–32)
CREAT SERPL-MCNC: 5.93 MG/DL (ref 0.5–1.05)
EGFRCR SERPLBLD CKD-EPI 2021: 8 ML/MIN/1.73M*2
EOSINOPHIL # BLD AUTO: 0.85 X10*3/UL (ref 0–0.7)
EOSINOPHIL NFR BLD AUTO: 10.9 %
ERYTHROCYTE [DISTWIDTH] IN BLOOD BY AUTOMATED COUNT: 15.2 % (ref 11.5–14.5)
GLUCOSE BLD MANUAL STRIP-MCNC: 101 MG/DL (ref 74–99)
GLUCOSE BLD MANUAL STRIP-MCNC: 129 MG/DL (ref 74–99)
GLUCOSE BLD MANUAL STRIP-MCNC: 304 MG/DL (ref 74–99)
GLUCOSE SERPL-MCNC: 102 MG/DL (ref 74–99)
HCT VFR BLD AUTO: 30.5 % (ref 36–46)
HGB BLD-MCNC: 9.4 G/DL (ref 12–16)
IMM GRANULOCYTES # BLD AUTO: 0.04 X10*3/UL (ref 0–0.7)
IMM GRANULOCYTES NFR BLD AUTO: 0.5 % (ref 0–0.9)
LYMPHOCYTES # BLD AUTO: 1 X10*3/UL (ref 1.2–4.8)
LYMPHOCYTES NFR BLD AUTO: 12.8 %
MCH RBC QN AUTO: 29 PG (ref 26–34)
MCHC RBC AUTO-ENTMCNC: 30.8 G/DL (ref 32–36)
MCV RBC AUTO: 94 FL (ref 80–100)
MONOCYTES # BLD AUTO: 0.73 X10*3/UL (ref 0.1–1)
MONOCYTES NFR BLD AUTO: 9.3 %
NEUTROPHILS # BLD AUTO: 5.13 X10*3/UL (ref 1.2–7.7)
NEUTROPHILS NFR BLD AUTO: 65.5 %
NRBC BLD-RTO: 0 /100 WBCS (ref 0–0)
PHOSPHATE SERPL-MCNC: 5.5 MG/DL (ref 2.5–4.9)
PLATELET # BLD AUTO: 233 X10*3/UL (ref 150–450)
POTASSIUM SERPL-SCNC: 5.8 MMOL/L (ref 3.5–5.3)
RBC # BLD AUTO: 3.24 X10*6/UL (ref 4–5.2)
SODIUM SERPL-SCNC: 138 MMOL/L (ref 136–145)
WBC # BLD AUTO: 7.8 X10*3/UL (ref 4.4–11.3)

## 2025-01-14 PROCEDURE — 99233 SBSQ HOSP IP/OBS HIGH 50: CPT | Performed by: INTERNAL MEDICINE

## 2025-01-14 PROCEDURE — 85025 COMPLETE CBC W/AUTO DIFF WBC: CPT | Performed by: STUDENT IN AN ORGANIZED HEALTH CARE EDUCATION/TRAINING PROGRAM

## 2025-01-14 PROCEDURE — 2500000001 HC RX 250 WO HCPCS SELF ADMINISTERED DRUGS (ALT 637 FOR MEDICARE OP): Performed by: STUDENT IN AN ORGANIZED HEALTH CARE EDUCATION/TRAINING PROGRAM

## 2025-01-14 PROCEDURE — 2500000002 HC RX 250 W HCPCS SELF ADMINISTERED DRUGS (ALT 637 FOR MEDICARE OP, ALT 636 FOR OP/ED): Performed by: STUDENT IN AN ORGANIZED HEALTH CARE EDUCATION/TRAINING PROGRAM

## 2025-01-14 PROCEDURE — 2500000004 HC RX 250 GENERAL PHARMACY W/ HCPCS (ALT 636 FOR OP/ED): Performed by: STUDENT IN AN ORGANIZED HEALTH CARE EDUCATION/TRAINING PROGRAM

## 2025-01-14 PROCEDURE — 82947 ASSAY GLUCOSE BLOOD QUANT: CPT

## 2025-01-14 PROCEDURE — 1200000002 HC GENERAL ROOM WITH TELEMETRY DAILY

## 2025-01-14 PROCEDURE — 6350000001 HC RX 635 EPOETIN >10,000 UNITS: Mod: JZ,TB | Performed by: NURSE PRACTITIONER

## 2025-01-14 PROCEDURE — 2500000005 HC RX 250 GENERAL PHARMACY W/O HCPCS: Performed by: STUDENT IN AN ORGANIZED HEALTH CARE EDUCATION/TRAINING PROGRAM

## 2025-01-14 PROCEDURE — 99233 SBSQ HOSP IP/OBS HIGH 50: CPT | Performed by: STUDENT IN AN ORGANIZED HEALTH CARE EDUCATION/TRAINING PROGRAM

## 2025-01-14 PROCEDURE — 80069 RENAL FUNCTION PANEL: CPT | Performed by: STUDENT IN AN ORGANIZED HEALTH CARE EDUCATION/TRAINING PROGRAM

## 2025-01-14 PROCEDURE — 2500000002 HC RX 250 W HCPCS SELF ADMINISTERED DRUGS (ALT 637 FOR MEDICARE OP, ALT 636 FOR OP/ED): Performed by: INTERNAL MEDICINE

## 2025-01-14 PROCEDURE — 36415 COLL VENOUS BLD VENIPUNCTURE: CPT | Performed by: STUDENT IN AN ORGANIZED HEALTH CARE EDUCATION/TRAINING PROGRAM

## 2025-01-14 PROCEDURE — 8010000001 HC DIALYSIS - HEMODIALYSIS PER DAY

## 2025-01-14 RX ORDER — DICYCLOMINE HYDROCHLORIDE 10 MG/1
10 CAPSULE ORAL 2 TIMES DAILY PRN
Status: DISCONTINUED | OUTPATIENT
Start: 2025-01-14 | End: 2025-01-27 | Stop reason: HOSPADM

## 2025-01-14 RX ORDER — TRAZODONE HYDROCHLORIDE 50 MG/1
50 TABLET ORAL NIGHTLY PRN
Status: DISCONTINUED | OUTPATIENT
Start: 2025-01-14 | End: 2025-01-27 | Stop reason: HOSPADM

## 2025-01-14 RX ORDER — TALC
6 POWDER (GRAM) TOPICAL DAILY
Status: DISCONTINUED | OUTPATIENT
Start: 2025-01-14 | End: 2025-01-27 | Stop reason: HOSPADM

## 2025-01-14 RX ADMIN — PETROLATUM: 420 OINTMENT TOPICAL at 11:51

## 2025-01-14 RX ADMIN — RENO CAPS 1 CAPSULE: 100; 1.5; 1.7; 20; 10; 1; 150; 5; 6 CAPSULE ORAL at 11:40

## 2025-01-14 RX ADMIN — METOCLOPRAMIDE 5 MG: 10 TABLET ORAL at 17:41

## 2025-01-14 RX ADMIN — SEVELAMER CARBONATE 800 MG: 800 TABLET, FILM COATED ORAL at 11:40

## 2025-01-14 RX ADMIN — CLONIDINE HYDROCHLORIDE 0.3 MG: 0.1 TABLET ORAL at 11:40

## 2025-01-14 RX ADMIN — SEVELAMER CARBONATE 800 MG: 800 TABLET, FILM COATED ORAL at 16:56

## 2025-01-14 RX ADMIN — CARVEDILOL 25 MG: 12.5 TABLET, FILM COATED ORAL at 11:39

## 2025-01-14 RX ADMIN — GABAPENTIN 300 MG: 300 CAPSULE ORAL at 21:49

## 2025-01-14 RX ADMIN — SODIUM ZIRCONIUM CYCLOSILICATE 10 G: 10 POWDER, FOR SUSPENSION ORAL at 21:51

## 2025-01-14 RX ADMIN — POLYETHYLENE GLYCOL 3350 17 G: 17 POWDER, FOR SOLUTION ORAL at 11:38

## 2025-01-14 RX ADMIN — SODIUM ZIRCONIUM CYCLOSILICATE 10 G: 10 POWDER, FOR SUSPENSION ORAL at 14:02

## 2025-01-14 RX ADMIN — ATORVASTATIN CALCIUM 80 MG: 80 TABLET, FILM COATED ORAL at 21:49

## 2025-01-14 RX ADMIN — ACETAMINOPHEN 975 MG: 325 TABLET, FILM COATED ORAL at 17:41

## 2025-01-14 RX ADMIN — ASPIRIN 81 MG: 81 TABLET, COATED ORAL at 11:40

## 2025-01-14 RX ADMIN — APIXABAN 5 MG: 5 TABLET, FILM COATED ORAL at 11:40

## 2025-01-14 RX ADMIN — HYDRALAZINE HYDROCHLORIDE 100 MG: 50 TABLET ORAL at 16:56

## 2025-01-14 RX ADMIN — FLUTICASONE PROPIONATE 2 SPRAY: 50 SPRAY, METERED NASAL at 11:51

## 2025-01-14 RX ADMIN — INSULIN LISPRO 8 UNITS: 100 INJECTION, SOLUTION INTRAVENOUS; SUBCUTANEOUS at 16:56

## 2025-01-14 RX ADMIN — EPOETIN ALFA-EPBX 10000 UNITS: 10000 INJECTION, SOLUTION INTRAVENOUS; SUBCUTANEOUS at 22:58

## 2025-01-14 RX ADMIN — Medication: at 21:50

## 2025-01-14 RX ADMIN — MELATONIN 6 MG: 3 TAB ORAL at 17:41

## 2025-01-14 RX ADMIN — HYDRALAZINE HYDROCHLORIDE 100 MG: 50 TABLET ORAL at 21:48

## 2025-01-14 RX ADMIN — DICYCLOMINE HYDROCHLORIDE 10 MG: 10 CAPSULE ORAL at 21:07

## 2025-01-14 RX ADMIN — CARVEDILOL 25 MG: 12.5 TABLET, FILM COATED ORAL at 21:50

## 2025-01-14 RX ADMIN — TORSEMIDE 20 MG: 20 TABLET ORAL at 21:49

## 2025-01-14 RX ADMIN — ACETAMINOPHEN 975 MG: 325 TABLET, FILM COATED ORAL at 11:41

## 2025-01-14 RX ADMIN — HYDRALAZINE HYDROCHLORIDE 100 MG: 50 TABLET ORAL at 11:40

## 2025-01-14 RX ADMIN — TORSEMIDE 20 MG: 20 TABLET ORAL at 11:41

## 2025-01-14 RX ADMIN — APIXABAN 5 MG: 5 TABLET, FILM COATED ORAL at 21:49

## 2025-01-14 RX ADMIN — METOCLOPRAMIDE 5 MG: 10 TABLET ORAL at 11:40

## 2025-01-14 RX ADMIN — DILTIAZEM HYDROCHLORIDE 360 MG: 180 CAPSULE, COATED, EXTENDED RELEASE ORAL at 11:40

## 2025-01-14 RX ADMIN — PETROLATUM: 420 OINTMENT TOPICAL at 21:50

## 2025-01-14 ASSESSMENT — COGNITIVE AND FUNCTIONAL STATUS - GENERAL
MOBILITY SCORE: 24
DAILY ACTIVITIY SCORE: 24

## 2025-01-14 ASSESSMENT — PAIN - FUNCTIONAL ASSESSMENT
PAIN_FUNCTIONAL_ASSESSMENT: NO/DENIES PAIN
PAIN_FUNCTIONAL_ASSESSMENT: 0-10

## 2025-01-14 ASSESSMENT — PAIN SCALES - GENERAL
PAINLEVEL_OUTOF10: 7
PAINLEVEL_OUTOF10: 0 - NO PAIN

## 2025-01-14 NOTE — CARE PLAN
The clinical goals for the shift include Pt SBP will be <200    Problem: Discharge Planning  Goal: Discharge to home or other facility with appropriate resources  Outcome: Progressing

## 2025-01-14 NOTE — NURSING NOTE
Report to Receiving RN:    Report To: PONCHO Gama  Time Report Called: [id number]  Hand-Off Communication: Tolerated tx well. Removed 2.5 L fluid.  Post VS: 135/60, 63. AOX4.  Complications During Treatment: No  Ultrafiltration Treatment: Yes  Medications Administered During Dialysis: No  Blood Products Administered During Dialysis: N/A  Labs Sent During Dialysis: No  Heparin Drip Rate Changes: N/A  Dialysis Catheter Dressing: N/A  Last Dressing Change: N/A    Last Updated: 10:44 AM by KEY EARLY

## 2025-01-14 NOTE — NURSING NOTE
.Report from Sending RN:    Report From: PONCHO Leiva  Recent Surgery of Procedure: No  Baseline Level of Consciousness (LOC): A&O X3  Oxygen Use: No  Type: Room air  Diabetic: No  Last BP Med Given Day of Dialysis: none  Last Pain Med Given: none  Lab Tests to be Obtained with Dialysis: Yes  Blood Transfusion to be Given During Dialysis: No  Available IV Access: Yes  Medications to be Administered During Dialysis: No  Continuous IV Infusion Running: No  Restraints on Currently or in the Last 24 Hours: No  Hand-Off Communication: Pt had no acute event overnight, vital signs stable. Not on precaution, no BP medication given this morning. Travel by cart  Dialysis Catheter Dressing: AVF  Last Dressing Change: N/A

## 2025-01-14 NOTE — PROGRESS NOTES
"Nephrology Follow-up Note   Patient ID: Stacey Heath is a 54 y.o. female.   Admitted for :   Chief Complaint   Patient presents with    Shortness of Breath      Evaluation of patient on dialysis at [facility]  [address]  [facility] [id number] on 12/29/2024  Seen and examined during hemodialysis. Labs and events reviewed.      Subjective:   Feels is \"coming down with a cold\" no chills and c/o CP/SOB/Abd pain  No c/o related to HD   Could not sleep since midnight per pt due to uncomfortable hospital bed     Patient Active Problem List   Diagnosis    Anxiety    Astigmatism    Carotid bruit    Diabetes mellitus type 2, uncomplicated (Multi)    Coronary artery disease involving native coronary artery    Iron deficiency anemia    Migraine    Neuropathy    Transplant    Polyneuropathy due to type 2 diabetes mellitus (Multi)    Nuclear senile cataract of both eyes    Normocytic normochromic anemia    Low back pain    Hidradenitis    Complex dyslipidemia    Chronic heart failure with preserved ejection fraction (HFpEF)    Malnutrition of moderate degree (Multi)    Kidney transplant candidate    Nonrheumatic mitral valve regurgitation    Chronic deep vein thrombosis (DVT) of brachial vein of left upper extremity (Multi)    Acute diastolic CHF (congestive heart failure)    Hypertensive emergency    Resistant hypertension    Congestive heart failure    ESRD (end stage renal disease) on dialysis (Multi)    Flash pulmonary edema    Sleep-related breathing disorder    Congestive heart failure, unspecified HF chronicity, unspecified heart failure type    COPD exacerbation (Multi)       Scheduled medications:  acetaminophen, 975 mg, oral, q8h  ammonium lactate, , Topical, BID  apixaban, 5 mg, oral, BID  aspirin, 81 mg, oral, Daily  atorvastatin, 80 mg, oral, Nightly  bisacodyl, 10 mg, rectal, Daily  carvedilol, 25 mg, oral, BID  cloNIDine, 0.3 mg, oral, q8h BELEN  dilTIAZem CD, 360 mg, oral, " Daily  fluticasone, 2 spray, Each Nostril, Daily  fluticasone furoate-vilanteroL, 1 puff, inhalation, Daily  gabapentin, 300 mg, oral, Nightly  hydrALAZINE, 100 mg, oral, TID  insulin lispro, 0-10 Units, subcutaneous, TID AC  lidocaine, 1 patch, transdermal, Daily  metoclopramide, 5 mg, oral, q6h  oxygen, , inhalation, Continuous - Inhalation  perflutren lipid microspheres, 0.5-10 mL of dilution, intravenous, Once in imaging  polyethylene glycol, 17 g, oral, Daily  sennosides-docusate sodium, 1 tablet, oral, Nightly  sevelamer carbonate, 800 mg, oral, TID  torsemide, 20 mg, oral, 2 times per day on Sunday Tuesday Thursday Saturday  valsartan, 320 mg, oral, q PM  vitamin B complex-vitamin C-folic acid, 1 capsule, oral, Daily  white petrolatum, , Topical, BID         PRN medications: albuterol, diclofenac sodium, diphenhydrAMINE, ipratropium-albuteroL, lidocaine, ondansetron, pramoxine, sodium chloride, SUMAtriptan, traZODone     Heart Rate:  [68-90]   Temp:  [35.9 °C (96.6 °F)-36.8 °C (98.2 °F)]   Resp:  [17-18]   BP: (148-188)/(64-81)   SpO2:  [96 %-97 %]    Weight: 55.3 kg (122 lb)   Gen: alert, NAD  HEENT: NC/AT  Neck: supple, no JVD   Pulm: clear ant b/l   CVS: RRR, no rub  Abd: S/NT/ND  LE: no edema , no cyanosis   Neuro: no asterixis   Dialysis acces:  RUE AVF     Lab Results   Component Value Date    WBC 7.8 01/14/2025    HGB 9.4 (L) 01/14/2025    HCT 30.5 (L) 01/14/2025    MCV 94 01/14/2025     01/14/2025     Lab Results   Component Value Date    GLUCOSE 102 (H) 01/14/2025    CALCIUM 9.5 01/14/2025     01/14/2025    K 5.8 (H) 01/14/2025    CO2 30 01/14/2025    CL 95 (L) 01/14/2025    BUN 54 (H) 01/14/2025    CREATININE 5.93 (H) 01/14/2025     Results from last 72 hours   Lab Units 01/14/25  0731   ALBUMIN g/dL 3.4   GLUCOSE mg/dL 102*   HEMOGLOBIN g/dL 9.4*   WBC AUTO x10*3/uL 7.8      Results from last 72 hours   Lab Units 01/14/25  0731   SODIUM mmol/L 138   POTASSIUM mmol/L 5.8*   CO2 mmol/L  30   BUN mg/dL 54*   CREATININE mg/dL 5.93*   PHOSPHORUS mg/dL 5.5*   CALCIUM mg/dL 9.5        Assessment   ESRD-HD TTS admitted with SOB / hypervolemia   HyperK this AM - but results came back after most UF done this AM     Plan   Plan UF per submitted orders, 2.5L   as tolerated  Added Lokelma 10 mg TID today ; plan HD again first shift tomorrow   Recommend Holding valsartan tonight ; suggest increase home torsemide to 100 mg daily (for better compliance with once a day regimen) on non-HD days   MBD - Phosphorus binder: continue with Sevelamer AC  Anemia of CKD: EPO to be given x 3 per week - nepho CNP will add   Access: no issues   BP: acceptable during treatment   Renal Diet   Daily renal MVI   Next HD tomorrow - she should be close to euvolemia after HD tomorrow and ready for Discharge   Haydee Mosher MD MPH

## 2025-01-14 NOTE — PROGRESS NOTES
Stacey Heath  Age: 54 y.o.  MRN: [id number]  Date: 1/8/2025  Location of service: in community    Program Details  Medicaid Community Clinical Case Management  Status: Enrolled  Effective Dates: 10/17/2023 - present  Responsible Staff: NAREN Davidson      Goals Reviewed:  Problem: Anxiety       Goal: Maintain coping skills for continuous improvement       Priority: Medium        Problem: Kidney Issues       Goal: Improve health to get on kidney transplant list       Priority: High        Problem: Negative Experience, Conflict with, or Distrust of Providers and/or Health System       Goal: Plan to Address Patient Specific Negative Experience, Distrust, or Conflict with Providers and/or Health System       Priority: High        Problem: Risk of Uncoordinated Care       Goal: Care will be Coordinated and Supported by a Multidisciplinary Team of Providers       Priority: High          Summary:  This writer meets with patient in the hospital for a community visit.  Patient discusses her low sodium diet. Patient states she is strict with her low sodium diet, but states she occasionally (1-2 times a month) has very very small high sodium meal portions. Patient states this does not exceed her sodium limit.  While this writer is present, patient's inpatient nephrologist comes in and states he would like her to do 2 hours of dialysis every day rather than 4 hours 3 days a week. Patient is agreeable to this and states if it must be the case when she goes home she would do it.  This writer engages in relationship building.    Appointment start time: 1015  Appointment completion time: 1130  Total time spent with patient (in minutes): 75  Non-Billable Time: 75  Billable Time Total: 0    Roberta Gallego RN

## 2025-01-14 NOTE — PROGRESS NOTES
"PSYCHIATRY CONSULT-LIAISON PROGRESS NOTE    SUBJECTIVE  The patient states that she was awake from midnight to 7am last night. She slept a bit during dialysis this morning. She took the trazodone last night at 2031. She says she fell asleep after taking it but then woke up at midnight and couldn't fall back asleep. She denies any vivid dreams or hallucinations.     ==========  Additional information: trazodone @2031    OBJECTIVE    VITALS      1/13/2025     2:33 PM 1/13/2025     4:01 PM 1/13/2025     8:30 PM 1/14/2025    12:00 AM 1/14/2025     7:28 AM 1/14/2025    10:45 AM 1/14/2025    11:34 AM   Vitals   Systolic 188 184 148 163   185   Diastolic 81 78 64 67   81   Heart Rate 68 90 77 87 76 69 79   Temp  35.9 °C (96.6 °F) 36.7 °C (98.1 °F) 36.8 °C (98.2 °F) 36.4 °C (97.5 °F)  36.4 °C (97.5 °F)   Resp  18 17 18           MENTAL STATUS EXAM  General: Female sitting on bed comfortably in no acute distress. Eating lunch.   Appearance: Appears stated age and Casually dressed  Attitude/Behavior: Cooperative, conversant, engaged, and with good eye contact.  Motor Activity: No psychomotor agitation or retardation. No abnormal movements, tremors or tics  Speech: Normal rate, tone and rhythm and coherent speech  Mood: \"Good\"   Affect: Euthymic, full-range, bright, congruent with mood  Thought Process: Linear, goal directed  Thought Content: Appropriate with no SI or HI. No perseverations or obsessions identified  Perception: Denies AVH. Does not appear to be internally stimulated  Cognition: Alert and oriented x4 to person, place, time, and situation  Insight: Fair, in regards to understanding mental health condition  Judgement: Fair    CURRENT MEDICATIONS  Scheduled medications  acetaminophen, 975 mg, oral, q8h  ammonium lactate, , Topical, BID  apixaban, 5 mg, oral, BID  aspirin, 81 mg, oral, Daily  atorvastatin, 80 mg, oral, Nightly  bisacodyl, 10 mg, rectal, Daily  carvedilol, 25 mg, oral, BID  cloNIDine, 0.3 mg, oral, " q8h BELEN  dilTIAZem CD, 360 mg, oral, Daily  epoetin joceline or biosimilar, 10,000 Units, intravenous, Once per day on Tuesday Thursday Saturday  fluticasone, 2 spray, Each Nostril, Daily  fluticasone furoate-vilanteroL, 1 puff, inhalation, Daily  gabapentin, 300 mg, oral, Nightly  hydrALAZINE, 100 mg, oral, TID  insulin lispro, 0-10 Units, subcutaneous, TID AC  lidocaine, 1 patch, transdermal, Daily  metoclopramide, 5 mg, oral, q6h  oxygen, , inhalation, Continuous - Inhalation  perflutren lipid microspheres, 0.5-10 mL of dilution, intravenous, Once in imaging  polyethylene glycol, 17 g, oral, Daily  sennosides-docusate sodium, 1 tablet, oral, Nightly  sevelamer carbonate, 800 mg, oral, TID  sodium zirconium cyclosilicate, 10 g, oral, TID  torsemide, 20 mg, oral, 2 times per day on Sunday Tuesday Thursday Saturday  valsartan, 320 mg, oral, q PM  vitamin B complex-vitamin C-folic acid, 1 capsule, oral, Daily  white petrolatum, , Topical, BID        Continuous medications       PRN medications  PRN medications: albuterol, diclofenac sodium, diphenhydrAMINE, ipratropium-albuteroL, lidocaine, ondansetron, pramoxine, sodium chloride, SUMAtriptan, traZODone     LABS  Results for orders placed or performed during the hospital encounter of 12/29/24 (from the past 24 hours)   POCT GLUCOSE   Result Value Ref Range    POCT Glucose 149 (H) 74 - 99 mg/dL   POCT GLUCOSE   Result Value Ref Range    POCT Glucose 164 (H) 74 - 99 mg/dL   CBC and Auto Differential   Result Value Ref Range    WBC 7.8 4.4 - 11.3 x10*3/uL    nRBC 0.0 0.0 - 0.0 /100 WBCs    RBC 3.24 (L) 4.00 - 5.20 x10*6/uL    Hemoglobin 9.4 (L) 12.0 - 16.0 g/dL    Hematocrit 30.5 (L) 36.0 - 46.0 %    MCV 94 80 - 100 fL    MCH 29.0 26.0 - 34.0 pg    MCHC 30.8 (L) 32.0 - 36.0 g/dL    RDW 15.2 (H) 11.5 - 14.5 %    Platelets 233 150 - 450 x10*3/uL    Neutrophils % 65.5 40.0 - 80.0 %    Immature Granulocytes %, Automated 0.5 0.0 - 0.9 %    Lymphocytes % 12.8 13.0 - 44.0 %     Monocytes % 9.3 2.0 - 10.0 %    Eosinophils % 10.9 0.0 - 6.0 %    Basophils % 1.0 0.0 - 2.0 %    Neutrophils Absolute 5.13 1.20 - 7.70 x10*3/uL    Immature Granulocytes Absolute, Automated 0.04 0.00 - 0.70 x10*3/uL    Lymphocytes Absolute 1.00 (L) 1.20 - 4.80 x10*3/uL    Monocytes Absolute 0.73 0.10 - 1.00 x10*3/uL    Eosinophils Absolute 0.85 (H) 0.00 - 0.70 x10*3/uL    Basophils Absolute 0.08 0.00 - 0.10 x10*3/uL   Renal Function Panel   Result Value Ref Range    Glucose 102 (H) 74 - 99 mg/dL    Sodium 138 136 - 145 mmol/L    Potassium 5.8 (H) 3.5 - 5.3 mmol/L    Chloride 95 (L) 98 - 107 mmol/L    Bicarbonate 30 21 - 32 mmol/L    Anion Gap 19 10 - 20 mmol/L    Urea Nitrogen 54 (H) 6 - 23 mg/dL    Creatinine 5.93 (H) 0.50 - 1.05 mg/dL    eGFR 8 (L) >60 mL/min/1.73m*2    Calcium 9.5 8.6 - 10.6 mg/dL    Phosphorus 5.5 (H) 2.5 - 4.9 mg/dL    Albumin 3.4 3.4 - 5.0 g/dL   POCT GLUCOSE   Result Value Ref Range    POCT Glucose 101 (H) 74 - 99 mg/dL     *Note: Due to a large number of results and/or encounters for the requested time period, some results have not been displayed. A complete set of results can be found in Results Review.        IMAGING  No results found.     PSYCHIATRIC RISK ASSESSMENT  Acute Risk of Harm to Others is Considered: Low  Acute Risk of Harm to Self is Considered: Low    ASSESSMENT AND PLAN  Stacey Heath is a 54 y.o. female with a past psychiatric history of unspecified anxiety and depression and a past medical history of end stage renal disease on dialysis T/Th/Sat, HTN, HFpEF, COPD, branchial DVT on Eliquis who was admitted to [facility] on 12/29/2024 for hypertensive emergency c/b flash pulmonary edema. Psychiatry was consulted on 1/10 for concern for hallucinations.     On initial assessment, patient is alert and oriented x4 to person, place, time and situation. Description of experience that was concerning for hallucinations likely related to current insomnia during this admission. Suspect that  "they are likely vivid dreams/nightmares. Discussed sleep hygiene and offered potential medication management of insomnia. Patient and her daughter are not interested in medication changes at this time. The patient later became interested in trying medications to help with sleep. She was started on trazodone 25mg at bedtime PRN.      Updates 1/14: Trazodone did help the patient fall asleep but she had difficulty staying asleep. Recommend increasing the dose of trazodone to 50mg PO at bedtime PRN.      IMPRESSION  #Insomnia  #Nightmares     RECOMMENDATIONS  Safety:  - Patient does not currently meet criteria for inpatient psychiatric admission. - To evaluate decision-making capacity, recommend use of the Capacity Evaluation Tool. Search “[facility] Capacity Evaluation under SmartText\"   - Patient does not require a 1:1 sitter from a psychiatric perspective at this time.  - Defer to primary team decision for 1:1 sitter.  - As with all hospitalized patients, would recommend delirium precautions, as below.     Work-up:  -EKG (1/6/2025): QTc Fredericia of 400ms     Medications:  - INCREASE trazodone to 50mg PO at bedtime PRN insomnia.      Follow-up:  - If there are ongoing sleep concerns or patient continues to endorse insomnia, recommend close follow-up with her PCP.     - Discussed recommendations with primary team.  - Psychiatry will continue to follow.      Please page [phone] with any questions or concerns.     Medication Consent  Medication Consent: n/a; consult service     RESIDENT ATTESTATION:  Patient discussed with Dr. Connolly, who agrees with above plan which I have reviewed/edited.     Hazel Almaraz MD     "

## 2025-01-14 NOTE — PROGRESS NOTES
Assessment/Plan   Stacey Heath is a 54 y.o. female PMH significant for ESRD 2/2 on dialysis T/Th/Sat via RUE AVF (last complete session 01/04/2025), HTN, HFpEF, COPD, brachial DVT on Eliquis who presented for [facility] ED for acute onset shortness of breath. Pt who was admitted to the MICU on 12/29/24 for hypertensive emergency c/b flash pulmonary edema. She has recurrent admissions related to her dialysis and breathing. She was treated with cardene gtt and received UF and HD with nephrology. cardene gtt was weaned and patient resumed home anti-HTN regimen. The patient was transferred to medicine floor but had recurrent hypertensive emergency with bp to 270s requiring cardene infusion to resume. Has had severe sessions of HD and UF, planning to continue until closer to dry weight of 50kg. She had adjustments to her home antihypertensives.      Hypertensive Emergency - resolved, still with elevated blood pressures  HFpEF, acute on chronic, resolved  - weaned off cardene drip  - TTE 01/03/2025 showed EF of 50-55 with left atrium severely dilated,      Mod-severe mitral annular calcification. Severely increased concentric systolic function. Dialyzing until closer to dry weight  - concern for hypotension given small LV size if pt becomes fluid depleted.     ESRD   - Dialysis T/Th/Sat   - continue HD/UF until closer to dry weight of 50kg per nephrology  - may need bp dose reductions as closer to dry weight  - Minoxidil on non-dialysis days and hold hydralazine and clonidine the morning of Dialysis.      Chronic deep vein thrombosis (DVT) of brachial vein of left upper extremity   - Continue with Eliquis     Headache likely due to migraine vs Hypertensive emergency  - improved    COPD   -Continue Albuterol neb q6hr prn     Insomnia     Acute on chronic anemia   - in setting of esrd  - no bleeding noted  - monitor    DMII with gastroparesis  - continue reglan and zofran prn  - continue bowel regimen  - no longer on  "medications.      Scheduled outpatient appointments in system:   Future Appointments   Date Time Provider Department Center   1/17/2025 11:00 AM Bess Hurst RD [facility]mt [facility]   1/17/2025 11:40 AM Judy Alcaraz MD [id number] Academic   1/27/2025  4:00 PM Makenna Barraza MD [id number] [facility]   3/25/2025  1:20 PM Nate Hyman MD [id number] Academic     ---------------------------------------------------------------------------------------------------  Subjective   Behavioral health in room. Discussed with pt, she reports improving Has. No vision change. No cp or sob currently, breathing is getting better. She feels when she returns home her ashtma is being triggered and causing her issues, she does not related issues to her HD in general or volume. She reports when she gets home she finds herself waking up in her sleep short of breath and has to get up. She is currently having insomnia.      ---------------------------------------------------------------------------------------------------  Objective   Last Recorded Vitals  Blood pressure (!) 185/81, pulse 79, temperature 36.4 °C (97.5 °F), resp. rate 18, height 1.397 m (4' 7\"), weight 59.9 kg (132 lb), SpO2 97%.  Intake/Output last 3 Shifts:  I/O last 3 completed shifts:  In: 1200 (20 mL/kg) [I.V.:800 (13.4 mL/kg); Other:400]  Out: 2900 (48.4 mL/kg) [Other:2900]  Weight: 59.9 kg     Physical Exam  Vitals and nursing note reviewed.   Constitutional:       General: She is not in acute distress.     Appearance: Normal appearance. She is obese.   HENT:      Head: Normocephalic and atraumatic.      Mouth/Throat:      Mouth: Mucous membranes are moist.   Eyes:      General: No scleral icterus.     Extraocular Movements: Extraocular movements intact.      Conjunctiva/sclera: Conjunctivae normal.   Cardiovascular:      Rate and Rhythm: Normal rate and regular rhythm.      Heart sounds: S1 normal and S2 normal. No murmur heard.  Pulmonary:      Effort: " Pulmonary effort is normal. No respiratory distress.      Breath sounds: No wheezing, rhonchi or rales.      Comments: Diminished b/l  Abdominal:      General: Bowel sounds are normal. There is no distension.      Palpations: Abdomen is soft.      Tenderness: There is no abdominal tenderness. There is no guarding or rebound.   Musculoskeletal:         General: No swelling or deformity.      Cervical back: Neck supple.   Skin:     General: Skin is warm and dry.      Findings: No rash.   Neurological:      General: No focal deficit present.      Mental Status: She is alert and oriented to person, place, and time. Mental status is at baseline.      Sensory: No sensory deficit.      Motor: No weakness.   Psychiatric:         Mood and Affect: Mood normal.         Behavior: Behavior normal.         Relevant Results  Lab Results   Component Value Date    WBC 7.8 01/14/2025    HGB 9.4 (L) 01/14/2025    HCT 30.5 (L) 01/14/2025    MCV 94 01/14/2025     01/14/2025      Lab Results   Component Value Date    GLUCOSE 102 (H) 01/14/2025    CALCIUM 9.5 01/14/2025     01/14/2025    K 5.8 (H) 01/14/2025    CO2 30 01/14/2025    CL 95 (L) 01/14/2025    BUN 54 (H) 01/14/2025    CREATININE 5.93 (H) 01/14/2025     Scheduled medications  acetaminophen, 975 mg, oral, q8h  ammonium lactate, , Topical, BID  apixaban, 5 mg, oral, BID  aspirin, 81 mg, oral, Daily  atorvastatin, 80 mg, oral, Nightly  bisacodyl, 10 mg, rectal, Daily  carvedilol, 25 mg, oral, BID  cloNIDine, 0.3 mg, oral, q8h BELEN  dilTIAZem CD, 360 mg, oral, Daily  epoetin joceline or biosimilar, 10,000 Units, intravenous, Once per day on Tuesday Thursday Saturday  fluticasone, 2 spray, Each Nostril, Daily  fluticasone furoate-vilanteroL, 1 puff, inhalation, Daily  gabapentin, 300 mg, oral, Nightly  hydrALAZINE, 100 mg, oral, TID  insulin lispro, 0-10 Units, subcutaneous, TID AC  lidocaine, 1 patch, transdermal, Daily  melatonin, 6 mg, oral, Daily  metoclopramide, 5 mg,  oral, q6h  oxygen, , inhalation, Continuous - Inhalation  perflutren lipid microspheres, 0.5-10 mL of dilution, intravenous, Once in imaging  polyethylene glycol, 17 g, oral, Daily  sennosides-docusate sodium, 1 tablet, oral, Nightly  sevelamer carbonate, 800 mg, oral, TID  sodium zirconium cyclosilicate, 10 g, oral, TID  torsemide, 20 mg, oral, 2 times per day on Sunday Tuesday Thursday Saturday  valsartan, 320 mg, oral, q PM  vitamin B complex-vitamin C-folic acid, 1 capsule, oral, Daily  white petrolatum, , Topical, BID      Continuous medications     PRN medications  PRN medications: albuterol, diclofenac sodium, diphenhydrAMINE, ipratropium-albuteroL, lidocaine, ondansetron, pramoxine, sodium chloride, SUMAtriptan, traZODone    Al Painter MD

## 2025-01-14 NOTE — PROGRESS NOTES
Stacey Heath  Age: 54 y.o.  MRN: [id number]  Date: 1/13/2025  Location of service: in community    Program Details  Medicaid Community Clinical Case Management  Status: Enrolled  Effective Dates: 10/17/2023 - present  Responsible Staff: NAREN Davidson      Goals Reviewed:  Problem: Access to Care Issue       Goal: Assess and Address Access Barriers       Priority: Medium        Problem: Anxiety       Goal: Maintain coping skills for continuous improvement       Priority: Medium        Problem: Financial Stressors       Goal: Assistance with financial concerns         Problem: Kidney Issues       Goal: Improve health to get on kidney transplant list       Priority: High        Problem: Medication Adherence       Goal: Adherence to Medication Regimen       Priority: High        Problem: Negative Experience, Conflict with, or Distrust of Providers and/or Health System       Goal: Plan to Address Patient Specific Negative Experience, Distrust, or Conflict with Providers and/or Health System       Priority: High        Problem: Risk of Uncoordinated Care       Goal: Care will be Coordinated and Supported by a Multidisciplinary Team of Providers       Priority: High          Summary:  This provider met with patient at [facility]. This provider listened with empathy as patient talked about all of the medical treatment she has been receiving and everyone concerned about her diet. This provider explained to patient the reasons for all of the diet attention. Patient reported that she has never been a the dry weight that has been set for her for dialysis. Patient also reports that there is no family history of kidney disease in her family and that she first started experiencing problems with her kidneys after her mom passed away three years ago. Patient also reports that she has requested to go over to [facility] on Saturday once her daughter gives birth to the expectant grandson.                      April  OZZY ChavezW

## 2025-01-14 NOTE — PROGRESS NOTES
Stacey Heath  Age: 54 y.o.  MRN: [id number]  Date: 1/7/2025  Location of service: in community    Program Details  Medicaid Community Clinical Case Management  Status: Enrolled  Effective Dates: 10/17/2023 - present  Responsible Staff: NAREN Davidson      Goals Reviewed:  Problem: Kidney Issues       Goal: Improve health to get on kidney transplant list       Priority: High        Problem: Negative Experience, Conflict with, or Distrust of Providers and/or Health System       Goal: Plan to Address Patient Specific Negative Experience, Distrust, or Conflict with Providers and/or Health System       Priority: High        Problem: Risk of Uncoordinated Care       Goal: Care will be Coordinated and Supported by a Multidisciplinary Team of Providers       Priority: High          Summary:  This writer met with patient in the hospital for a community visit.  Patient states she feels drunk because of the drowsiness and slurred speech she has been experiencing. Patient states she has also been experiencing hallucinations.  Patient reports being concerned about her health because she feels she is getting worse. However, patient states she received good news from the cardiologist (see cardiology note).  Patient verbalizes frustration because she feels the hospital staff doesn't believe her at times regarding her at home routines.  This writer engages in relationship building.    Appointment start time: 1045  Appointment completion time: 1205  Total time spent with patient (in minutes): 80  Non-Billable Time: 80  Billable Time Total: 0    Roberta Gallego RN

## 2025-01-14 NOTE — PROGRESS NOTES
Nephrology Follow-up Note   Patient ID: Stacey Heath is a 54 y.o. female.   Admitted for :   Chief Complaint   Patient presents with    Shortness of Breath      Evaluation of patient on dialysis at [facility]  [address]  [facility] [id number] on 12/29/2024  Seen and examined during hemodialysis. Labs and events reviewed.      Subjective:   Feels OK  C/o orthopnea    no c/o CP/SOB/F/C/Abd pain  No c/o related to HD ;     Patient Active Problem List   Diagnosis    Anxiety    Astigmatism    Carotid bruit    Diabetes mellitus type 2, uncomplicated (Multi)    Coronary artery disease involving native coronary artery    Iron deficiency anemia    Migraine    Neuropathy    Transplant    Polyneuropathy due to type 2 diabetes mellitus (Multi)    Nuclear senile cataract of both eyes    Normocytic normochromic anemia    Low back pain    Hidradenitis    Complex dyslipidemia    Chronic heart failure with preserved ejection fraction (HFpEF)    Malnutrition of moderate degree (Multi)    Kidney transplant candidate    Nonrheumatic mitral valve regurgitation    Chronic deep vein thrombosis (DVT) of brachial vein of left upper extremity (Multi)    Acute diastolic CHF (congestive heart failure)    Hypertensive emergency    Resistant hypertension    Congestive heart failure    ESRD (end stage renal disease) on dialysis (Multi)    Flash pulmonary edema    Sleep-related breathing disorder    Congestive heart failure, unspecified HF chronicity, unspecified heart failure type    COPD exacerbation (Multi)       Scheduled medications:  acetaminophen, 975 mg, oral, q8h  ammonium lactate, , Topical, BID  apixaban, 5 mg, oral, BID  aspirin, 81 mg, oral, Daily  atorvastatin, 80 mg, oral, Nightly  bisacodyl, 10 mg, rectal, Daily  carvedilol, 25 mg, oral, BID  cloNIDine, 0.3 mg, oral, q8h BELEN  dilTIAZem CD, 360 mg, oral, Daily  fluticasone, 2 spray, Each Nostril, Daily  fluticasone furoate-vilanteroL, 1 puff,  inhalation, Daily  gabapentin, 300 mg, oral, Nightly  hydrALAZINE, 100 mg, oral, TID  insulin lispro, 0-10 Units, subcutaneous, TID AC  lidocaine, 1 patch, transdermal, Daily  metoclopramide, 5 mg, oral, q6h  oxygen, , inhalation, Continuous - Inhalation  perflutren lipid microspheres, 0.5-10 mL of dilution, intravenous, Once in imaging  polyethylene glycol, 17 g, oral, Daily  sennosides-docusate sodium, 1 tablet, oral, Nightly  sevelamer carbonate, 800 mg, oral, TID  torsemide, 20 mg, oral, 2 times per day on Sunday Tuesday Thursday Saturday  valsartan, 320 mg, oral, q PM  vitamin B complex-vitamin C-folic acid, 1 capsule, oral, Daily  white petrolatum, , Topical, BID         PRN medications: albuterol, diclofenac sodium, diphenhydrAMINE, ipratropium-albuteroL, lidocaine, ondansetron, pramoxine, sodium chloride, SUMAtriptan, traZODone     Heart Rate:  [63-90]   Temp:  [35.9 °C (96.6 °F)-36.7 °C (98.1 °F)]   Resp:  [17-18]   BP: (148-188)/(64-81)   SpO2:  [96 %-97 %]    Weight: 55.3 kg (122 lb)   Gen: alert, NAD  HEENT: NC/AT  Neck: supple, no JVD   Pulm: distant BS at bases b/l , no crackles  CVS: RRR, no rub  Abd: S/NT/ND  LE: no edema , no cyanosis   Dialysis acces:  RUEAVF     Lab Results   Component Value Date    WBC 6.3 01/13/2025    HGB 10.1 (L) 01/13/2025    HCT 35.9 (L) 01/13/2025     (H) 01/13/2025     01/13/2025     Lab Results   Component Value Date    GLUCOSE 192 (H) 01/13/2025    CALCIUM 9.2 01/13/2025     (L) 01/13/2025    K 6.5 (HH) 01/13/2025    CO2 21 01/13/2025    CL 98 01/13/2025    BUN 69 (H) 01/13/2025    CREATININE 6.24 (H) 01/13/2025     Results from last 72 hours   Lab Units 01/13/25  0520   ALBUMIN g/dL 3.5   GLUCOSE mg/dL 192*   HEMOGLOBIN g/dL 10.1*   WBC AUTO x10*3/uL 6.3      Results from last 72 hours   Lab Units 01/13/25  0520   SODIUM mmol/L 134*   POTASSIUM mmol/L 6.5*   CO2 mmol/L 21   BUN mg/dL 69*   CREATININE mg/dL 6.24*   PHOSPHORUS mg/dL 5.2*   CALCIUM  mg/dL 9.2        Assessment   ESRD-HD admitted with SOB 2/2 missed HD     Plan   Plan HD per submitted orders, UF 2.5L  as tolerated  MBD - Phosphorus binder: continue with Sevelamer AC  Anemia of CKD: EPO to be restarted -- nephro will add  Access: no issues   BP: acceptable during treatment   Renal Diet   Daily renal MVI   Continue 3 x per week hemodialysis; SW to ensure availability of o/p HD chair at discharge  Next UF tomorrow  Haydee Mosher MD MPH

## 2025-01-15 ENCOUNTER — APPOINTMENT (OUTPATIENT)
Dept: DIALYSIS | Facility: HOSPITAL | Age: 55
End: 2025-01-15
Payer: COMMERCIAL

## 2025-01-15 ENCOUNTER — DOCUMENTATION (OUTPATIENT)
Dept: BEHAVIORAL HEALTH | Facility: CLINIC | Age: 55
End: 2025-01-15

## 2025-01-15 LAB
ALBUMIN SERPL BCP-MCNC: 3.6 G/DL (ref 3.4–5)
ANION GAP SERPL CALC-SCNC: 21 MMOL/L (ref 10–20)
BUN SERPL-MCNC: 72 MG/DL (ref 6–23)
CALCIUM SERPL-MCNC: 9.5 MG/DL (ref 8.6–10.6)
CHLORIDE SERPL-SCNC: 91 MMOL/L (ref 98–107)
CO2 SERPL-SCNC: 28 MMOL/L (ref 21–32)
CREAT SERPL-MCNC: 7.88 MG/DL (ref 0.5–1.05)
EGFRCR SERPLBLD CKD-EPI 2021: 6 ML/MIN/1.73M*2
ERYTHROCYTE [DISTWIDTH] IN BLOOD BY AUTOMATED COUNT: 15.2 % (ref 11.5–14.5)
GLUCOSE BLD MANUAL STRIP-MCNC: 144 MG/DL (ref 74–99)
GLUCOSE BLD MANUAL STRIP-MCNC: 165 MG/DL (ref 74–99)
GLUCOSE BLD MANUAL STRIP-MCNC: 238 MG/DL (ref 74–99)
GLUCOSE SERPL-MCNC: 135 MG/DL (ref 74–99)
HCT VFR BLD AUTO: 31.7 % (ref 36–46)
HGB BLD-MCNC: 9.4 G/DL (ref 12–16)
MCH RBC QN AUTO: 28.6 PG (ref 26–34)
MCHC RBC AUTO-ENTMCNC: 29.7 G/DL (ref 32–36)
MCV RBC AUTO: 96 FL (ref 80–100)
NRBC BLD-RTO: 0 /100 WBCS (ref 0–0)
PHOSPHATE SERPL-MCNC: 6 MG/DL (ref 2.5–4.9)
PLATELET # BLD AUTO: 241 X10*3/UL (ref 150–450)
POTASSIUM SERPL-SCNC: 6.1 MMOL/L (ref 3.5–5.3)
RBC # BLD AUTO: 3.29 X10*6/UL (ref 4–5.2)
SODIUM SERPL-SCNC: 134 MMOL/L (ref 136–145)
WBC # BLD AUTO: 6.9 X10*3/UL (ref 4.4–11.3)

## 2025-01-15 PROCEDURE — 85027 COMPLETE CBC AUTOMATED: CPT | Performed by: STUDENT IN AN ORGANIZED HEALTH CARE EDUCATION/TRAINING PROGRAM

## 2025-01-15 PROCEDURE — 2500000001 HC RX 250 WO HCPCS SELF ADMINISTERED DRUGS (ALT 637 FOR MEDICARE OP): Performed by: STUDENT IN AN ORGANIZED HEALTH CARE EDUCATION/TRAINING PROGRAM

## 2025-01-15 PROCEDURE — 2500000002 HC RX 250 W HCPCS SELF ADMINISTERED DRUGS (ALT 637 FOR MEDICARE OP, ALT 636 FOR OP/ED): Performed by: STUDENT IN AN ORGANIZED HEALTH CARE EDUCATION/TRAINING PROGRAM

## 2025-01-15 PROCEDURE — 36415 COLL VENOUS BLD VENIPUNCTURE: CPT | Performed by: STUDENT IN AN ORGANIZED HEALTH CARE EDUCATION/TRAINING PROGRAM

## 2025-01-15 PROCEDURE — 82947 ASSAY GLUCOSE BLOOD QUANT: CPT

## 2025-01-15 PROCEDURE — 99233 SBSQ HOSP IP/OBS HIGH 50: CPT | Performed by: INTERNAL MEDICINE

## 2025-01-15 PROCEDURE — 2500000005 HC RX 250 GENERAL PHARMACY W/O HCPCS: Performed by: STUDENT IN AN ORGANIZED HEALTH CARE EDUCATION/TRAINING PROGRAM

## 2025-01-15 PROCEDURE — 1200000002 HC GENERAL ROOM WITH TELEMETRY DAILY

## 2025-01-15 PROCEDURE — 80069 RENAL FUNCTION PANEL: CPT | Performed by: STUDENT IN AN ORGANIZED HEALTH CARE EDUCATION/TRAINING PROGRAM

## 2025-01-15 PROCEDURE — 99232 SBSQ HOSP IP/OBS MODERATE 35: CPT

## 2025-01-15 PROCEDURE — 8010000001 HC DIALYSIS - HEMODIALYSIS PER DAY

## 2025-01-15 PROCEDURE — 99233 SBSQ HOSP IP/OBS HIGH 50: CPT | Performed by: STUDENT IN AN ORGANIZED HEALTH CARE EDUCATION/TRAINING PROGRAM

## 2025-01-15 PROCEDURE — 2500000004 HC RX 250 GENERAL PHARMACY W/ HCPCS (ALT 636 FOR OP/ED): Performed by: STUDENT IN AN ORGANIZED HEALTH CARE EDUCATION/TRAINING PROGRAM

## 2025-01-15 RX ADMIN — CARVEDILOL 25 MG: 12.5 TABLET, FILM COATED ORAL at 12:00

## 2025-01-15 RX ADMIN — CARVEDILOL 25 MG: 12.5 TABLET, FILM COATED ORAL at 21:08

## 2025-01-15 RX ADMIN — Medication: at 21:14

## 2025-01-15 RX ADMIN — PETROLATUM: 420 OINTMENT TOPICAL at 12:04

## 2025-01-15 RX ADMIN — PETROLATUM: 420 OINTMENT TOPICAL at 21:14

## 2025-01-15 RX ADMIN — GABAPENTIN 300 MG: 300 CAPSULE ORAL at 21:09

## 2025-01-15 RX ADMIN — APIXABAN 5 MG: 5 TABLET, FILM COATED ORAL at 12:00

## 2025-01-15 RX ADMIN — HYDRALAZINE HYDROCHLORIDE 100 MG: 50 TABLET ORAL at 18:01

## 2025-01-15 RX ADMIN — SODIUM ZIRCONIUM CYCLOSILICATE 10 G: 10 POWDER, FOR SUSPENSION ORAL at 21:09

## 2025-01-15 RX ADMIN — METOCLOPRAMIDE 5 MG: 10 TABLET ORAL at 12:00

## 2025-01-15 RX ADMIN — SEVELAMER CARBONATE 800 MG: 800 TABLET, FILM COATED ORAL at 11:59

## 2025-01-15 RX ADMIN — CLONIDINE HYDROCHLORIDE 0.3 MG: 0.1 TABLET ORAL at 14:34

## 2025-01-15 RX ADMIN — ACETAMINOPHEN 975 MG: 325 TABLET, FILM COATED ORAL at 18:01

## 2025-01-15 RX ADMIN — Medication: at 12:04

## 2025-01-15 RX ADMIN — ACETAMINOPHEN 975 MG: 325 TABLET, FILM COATED ORAL at 11:59

## 2025-01-15 RX ADMIN — APIXABAN 5 MG: 5 TABLET, FILM COATED ORAL at 21:09

## 2025-01-15 RX ADMIN — SODIUM ZIRCONIUM CYCLOSILICATE 10 G: 10 POWDER, FOR SUSPENSION ORAL at 14:34

## 2025-01-15 RX ADMIN — ACETAMINOPHEN 975 MG: 325 TABLET, FILM COATED ORAL at 01:04

## 2025-01-15 RX ADMIN — DILTIAZEM HYDROCHLORIDE 360 MG: 180 CAPSULE, COATED, EXTENDED RELEASE ORAL at 12:00

## 2025-01-15 RX ADMIN — SEVELAMER CARBONATE 800 MG: 800 TABLET, FILM COATED ORAL at 18:01

## 2025-01-15 RX ADMIN — ASPIRIN 81 MG: 81 TABLET, COATED ORAL at 12:00

## 2025-01-15 RX ADMIN — INSULIN LISPRO 2 UNITS: 100 INJECTION, SOLUTION INTRAVENOUS; SUBCUTANEOUS at 14:34

## 2025-01-15 RX ADMIN — HYDRALAZINE HYDROCHLORIDE 100 MG: 50 TABLET ORAL at 12:00

## 2025-01-15 RX ADMIN — DIPHENHYDRAMINE HYDROCHLORIDE 50 MG: 25 CAPSULE ORAL at 00:09

## 2025-01-15 RX ADMIN — ATORVASTATIN CALCIUM 80 MG: 80 TABLET, FILM COATED ORAL at 21:08

## 2025-01-15 RX ADMIN — LIDOCAINE 4% 1 PATCH: 40 PATCH TOPICAL at 12:00

## 2025-01-15 RX ADMIN — VALSARTAN 320 MG: 160 TABLET, FILM COATED ORAL at 21:12

## 2025-01-15 RX ADMIN — RENO CAPS 1 CAPSULE: 100; 1.5; 1.7; 20; 10; 1; 150; 5; 6 CAPSULE ORAL at 12:00

## 2025-01-15 RX ADMIN — FLUTICASONE PROPIONATE 2 SPRAY: 50 SPRAY, METERED NASAL at 11:41

## 2025-01-15 RX ADMIN — SUMATRIPTAN 25 MG: 25 TABLET, FILM COATED ORAL at 12:10

## 2025-01-15 RX ADMIN — METOCLOPRAMIDE 5 MG: 10 TABLET ORAL at 21:13

## 2025-01-15 RX ADMIN — TRAZODONE HYDROCHLORIDE 50 MG: 50 TABLET ORAL at 22:40

## 2025-01-15 RX ADMIN — MELATONIN 6 MG: 3 TAB ORAL at 18:01

## 2025-01-15 ASSESSMENT — COGNITIVE AND FUNCTIONAL STATUS - GENERAL
DAILY ACTIVITIY SCORE: 24
MOBILITY SCORE: 24

## 2025-01-15 ASSESSMENT — PAIN SCALES - GENERAL
PAINLEVEL_OUTOF10: 0 - NO PAIN
PAINLEVEL_OUTOF10: 5 - MODERATE PAIN
PAINLEVEL_OUTOF10: 0 - NO PAIN
PAINLEVEL_OUTOF10: 0 - NO PAIN

## 2025-01-15 ASSESSMENT — PAIN SCALES - PAIN ASSESSMENT IN ADVANCED DEMENTIA (PAINAD)
TOTALSCORE: MEDICATION (SEE MAR)
BREATHING: NORMAL

## 2025-01-15 ASSESSMENT — PAIN DESCRIPTION - LOCATION: LOCATION: HEAD

## 2025-01-15 NOTE — PROGRESS NOTES
Nephrology Follow-up Note   Patient ID: Stacey Heath is a 54 y.o. female.   Admitted for :   Chief Complaint   Patient presents with    Shortness of Breath      Evaluation of patient on dialysis at [facility]  [address]  [facility] [id number] on 12/29/2024  Seen and examined during hemodialysis. Labs and events reviewed.      Subjective:   Feels great ; eating crackers during HD   OK, no c/o CP/SOB/F/C/Abd pain  No c/o related to HD     Patient Active Problem List   Diagnosis    Anxiety    Astigmatism    Carotid bruit    Diabetes mellitus type 2, uncomplicated (Multi)    Coronary artery disease involving native coronary artery    Iron deficiency anemia    Migraine    Neuropathy    Transplant    Polyneuropathy due to type 2 diabetes mellitus (Multi)    Nuclear senile cataract of both eyes    Normocytic normochromic anemia    Low back pain    Hidradenitis    Complex dyslipidemia    Chronic heart failure with preserved ejection fraction (HFpEF)    Malnutrition of moderate degree (Multi)    Kidney transplant candidate    Nonrheumatic mitral valve regurgitation    Chronic deep vein thrombosis (DVT) of brachial vein of left upper extremity (Multi)    Acute diastolic CHF (congestive heart failure)    Hypertensive emergency    Resistant hypertension    Congestive heart failure    ESRD (end stage renal disease) on dialysis (Multi)    Flash pulmonary edema    Sleep-related breathing disorder    Congestive heart failure, unspecified HF chronicity, unspecified heart failure type    COPD exacerbation (Multi)       Scheduled medications:  acetaminophen, 975 mg, oral, q8h  ammonium lactate, , Topical, BID  apixaban, 5 mg, oral, BID  aspirin, 81 mg, oral, Daily  atorvastatin, 80 mg, oral, Nightly  bisacodyl, 10 mg, rectal, Daily  carvedilol, 25 mg, oral, BID  cloNIDine, 0.3 mg, oral, q8h BELEN  dilTIAZem CD, 360 mg, oral, Daily  epoetin joceline or biosimilar, 10,000 Units, intravenous, Once per  day on Tuesday Thursday Saturday  fluticasone, 2 spray, Each Nostril, Daily  fluticasone furoate-vilanteroL, 1 puff, inhalation, Daily  gabapentin, 300 mg, oral, Nightly  hydrALAZINE, 100 mg, oral, TID  insulin lispro, 0-10 Units, subcutaneous, TID AC  lidocaine, 1 patch, transdermal, Daily  melatonin, 6 mg, oral, Daily  metoclopramide, 5 mg, oral, q6h  oxygen, , inhalation, Continuous - Inhalation  perflutren lipid microspheres, 0.5-10 mL of dilution, intravenous, Once in imaging  polyethylene glycol, 17 g, oral, Daily  sennosides-docusate sodium, 1 tablet, oral, Nightly  sevelamer carbonate, 800 mg, oral, TID  sodium zirconium cyclosilicate, 10 g, oral, TID  torsemide, 20 mg, oral, 2 times per day on Sunday Tuesday Thursday Saturday  valsartan, 320 mg, oral, q PM  vitamin B complex-vitamin C-folic acid, 1 capsule, oral, Daily  white petrolatum, , Topical, BID         PRN medications: albuterol, diclofenac sodium, dicyclomine, diphenhydrAMINE, ipratropium-albuteroL, lidocaine, ondansetron, pramoxine, sodium chloride, SUMAtriptan, traZODone     Heart Rate:  [61-99]   Temp:  [36.1 °C (97 °F)-36.6 °C (97.9 °F)]   Resp:  [16-19]   BP: ()/(52-70)   SpO2:  [94 %-100 %]    Weight: 55.3 kg (122 lb)   Gen: alert, NAD  HEENT: NC/AT  Neck: supple  Pulm: b/l crackles and rhonchi  CVS: RRR, no rub  Abd: S/NT/ND  LE: no edema   Dialysis acces:  [facility]     Lab Results   Component Value Date    WBC 6.9 01/15/2025    HGB 9.4 (L) 01/15/2025    HCT 31.7 (L) 01/15/2025    MCV 96 01/15/2025     01/15/2025     Lab Results   Component Value Date    GLUCOSE 135 (H) 01/15/2025    CALCIUM 9.5 01/15/2025     (L) 01/15/2025    K 6.1 (HH) 01/15/2025    CO2 28 01/15/2025    CL 91 (L) 01/15/2025    BUN 72 (H) 01/15/2025    CREATININE 7.88 (H) 01/15/2025     Results from last 72 hours   Lab Units 01/15/25  0516   ALBUMIN g/dL 3.6   GLUCOSE mg/dL 135*   HEMOGLOBIN g/dL 9.4*   WBC AUTO x10*3/uL 6.9      Results from last 72 hours    Lab Units 01/15/25  0516   SODIUM mmol/L 134*   POTASSIUM mmol/L 6.1*   CO2 mmol/L 28   BUN mg/dL 72*   CREATININE mg/dL 7.88*   PHOSPHORUS mg/dL 6.0*   CALCIUM mg/dL 9.5        Assessment   ESRD-HD TTS admitted with SOB / hypervolemia   HFpEF; severe calcified MV    Her daily weight gains are 1.5-2Kg - most if not all are fluid gains   K is high today - combination of CKD-5D + ARB use + dietary indiscretions     Plan   Plan HD per submitted orders, UF 2L  as tolerated  MBD - Phosphorus binder: continue with Sevelamer AC  Anemia of CKD: EPO to be given x 3 per week   Access: no issues   BP: acceptable during treatment ; torsemide change as per yesterday recs   Renal Diet   Daily renal MVI   Continue 3 x per week hemodialysis; next HD tomorrow and we will maintain TTS for the rest of this admission to match her o/p schedule   Renal diet and fluid restriction <800 ml/d to be reinforced.  Haydee Mosher MD MPH

## 2025-01-15 NOTE — PROGRESS NOTES
Recreation Therapy Note    Therapy Session  Visit Type: New visit  Session Start Time: 1500  Session End Time: 1550  Intervention Delivery: In-person  Conflict of Service: None  Number of family members present: 0  Family Present for Session: None  Family Participation: None  Number of staff members present: 1    Pre-assessment  Mood/Affect: Appropriate, Calm  Verbalized Emotional State:  (none verbalized)    Treatment  Areas of Focus: Coping, Socialization, Self-expression, Normalization, Stress reduction  Co-Treatment:  (none)  Interruption: No  Patient Fell Asleep at End of Session: No    Post-assessment  0-10 (Numeric) Pain Score: 0 - No pain  Henderson-Baker FACES Pain Rating: Hurts even more  Mood/Affect: Appropriate, Calm, Cooperative, Participative  Verbalized Emotional State:  (none verbalized)  Continue Visiting: Yes  Total Session Time (min): 50 minutes    Narrative  Assessment Detail: Patient was resting in bed upon arrival.  Plan: To encourage the exploration of safe and effective positive leisure coping skills to manage situational stressors.  Intervention: Patient chose a weaving project.  Evaluation: Patient was pleasant, invested and social throughout the session.  Follow-up: Will continue to encourage the exploration of positive leisure coping skills.  Patient Comments: None noted.    54 y.o. female with PMH significant for ESRD 2/2 on dialysis T/Th/Sat via [facility] (last complete session 11/28/2024), HTN, HFpEF, COPD, brachial DVT on Eliquis who presented for [facility] ED for acute onset shortness of breath.

## 2025-01-15 NOTE — CARE PLAN
Problem: Discharge Planning  Goal: Discharge to home or other facility with appropriate resources  Outcome: Progressing     Problem: Pain  Goal: Takes deep breaths with improved pain control throughout the shift  Outcome: Progressing  Goal: Turns in bed with improved pain control throughout the shift  Outcome: Progressing  Goal: Walks with improved pain control throughout the shift  Outcome: Progressing   The patient's goals for the shift include      The clinical goals for the shift include Patients BP will remain within acceptable/normal range

## 2025-01-15 NOTE — NURSING NOTE
.Report from Sending RN:    Report From: PONCHO Barraza  Recent Surgery of Procedure: No  Baseline Level of Consciousness (LOC): A&O X3  Oxygen Use: No  Type: Room air  Diabetic: No  Last BP Med Given Day of Dialysis: none  Last Pain Med Given: tylenol  Lab Tests to be Obtained with Dialysis: No  Blood Transfusion to be Given During Dialysis: No  Available IV Access: Yes  Medications to be Administered During Dialysis: No  Continuous IV Infusion Running: No  Restraints on Currently or in the Last 24 Hours: No  Hand-Off Communication: Pt had no acute event overnight, vital signs stable. Not on precaution, no morning medication given. Travel by cart.  Dialysis Catheter Dressing: AVF  Last Dressing Change: N/A

## 2025-01-15 NOTE — PROGRESS NOTES
Transitional Care Coordination Progress Note:  Patient discussed during interdisciplinary rounds.  Team members present: MD, AGUSTIN  Plan per Medical/Surgical team: Plan for 1-2 more sessions of HD to get pt closer to her dry weight, Nephrology team is following.  Payer: [facility] [employer], [facility]  Status: Inpatient  Discharge disposition: [facility] (central 3) for RN/LPN (assessment/med compliance), PT/OT and SW services.  Potential Barriers: none  ADOD: 1/17  JERRELL Mane confirmed yesterday via secure chat she contacted pt's outpatient HD center [facility] and pt is okay to return to outpatient HD center at time of discharge, she will fax them clinical information. Care coordinator will continue to follow for discharge planning needs.     Dorothy Ramos RN  Transitional Care Coordinator (TCC)  [phone] or [phone]

## 2025-01-15 NOTE — PROGRESS NOTES
"Subjective   History was provided by the father.  Wang Kiran is a 14 y.o. male who presents for evaluation of fever and PNA fu (2 admits in last 3wks for PNA and then \"viral\", out yest)  Symptoms include cough yes x 7d  - rhinorrhea/congestion yes  - ear pain No  - fever absent x last 2d (was 102 x 4d prior to that, and admitted) - CXR NL this wk  - problems breathing when not coughing no - no longer alb use per dad even w/ PNA  Associated abdominal symptoms:      He is drinking plenty of fluids.   Energy level NL:  Yes  Treatment to date: ibuprofen yest PM for 99.6 and b/c parent concern of incr sz w/ fevers    Exposure to COVID No  Exposure to URI no      Objective   Temp 36.9 °C (98.4 °F)   Wt 50.8 kg   General: alert, active, in no acute distress  Eyes:  scleral injection No  Ears: TM's normal, external auditory canals are clear   Nose: clear, no discharge  Throat: moist mucous membranes without erythema, exudates or petechiae  Neck: supple, no lymphadenopathy  Lungs: good aeration throughout all lung fields, no retractions, no nasal flaring, and clear breath sounds bilaterally  Heart: regular rate and rhythm, normal S1 and S2, no murmur  Abd:  soft, nontender, or no masses    Assessment/Plan   14 y.o. male w/ viral upper respiratory illness  Discussed diagnosis and treatment of URI.  Suggested symptomatic OTC remedies.  Follow up as needed.  " Assessment/Plan   Stacey Heath is a 54 y.o. female PMH significant for ESRD 2/2 on dialysis T/Th/Sat via [facility] (last complete session 01/04/2025), HTN, HFpEF, COPD, brachial DVT on Eliquis who presented for [facility] ED for acute onset shortness of breath. Pt who was admitted to the MICU on 12/29/24 for hypertensive emergency c/b flash pulmonary edema. She has recurrent admissions related to her dialysis and breathing. She was treated with cardene gtt and received UF and HD with nephrology. cardene gtt was weaned and patient resumed home anti-HTN regimen. The patient was transferred to medicine floor but had recurrent hypertensive emergency with bp to 270s requiring cardene infusion to resume. Has had severe sessions of HD and UF, planning to continue until closer to dry weight of 50kg. She had adjustments to her home antihypertensives.      Hypertensive Emergency - resolved  HFpEF, acute on chronic, resolved  - weaned off cardene drip  - still with elevated blood pressures, better controlled.   - TTE 01/03/2025 showed EF of 50-55 with left atrium severely dilated,      Mod-severe mitral annular calcification. Severely increased concentric systolic function. Dialyzing until closer to dry weight  - concern for hypotension given small LV size if pt becomes fluid depleted.     ESRD   - Dialysis T/Th/Sat   - continue HD/UF until closer to dry weight of 50kg per nephrology, currently 57kg after HD.   - may need bp dose reductions as closer to dry weight  -  hold hydralazine and clonidine the morning of Dialysis.    - Concern about dietary and fluid adherance.     Chronic deep vein thrombosis (DVT) of brachial vein of left upper extremity   - Continue with Eliquis     Headache likely due to migraine vs Hypertensive emergency  - improved    COPD   -Continue Albuterol neb q6hr prn     Insomnia   - Melatonin and trazodone prn    Acute on chronic anemia   - in setting of esrd  - no bleeding noted  - monitor    DMII with  "gastroparesis  - continue reglan and zofran prn  - continue bowel regimen  - no longer on medications for dm in setting of her esrd    Continuing goals of care, consulted palliative care. Nephrology planning [facility] meeting with behavioral jonah and would like to involve palliative in goc.         Scheduled outpatient appointments in system:   Future Appointments   Date Time Provider Department Center   1/17/2025 11:00 AM Bess Aris, RD [facility]   1/17/2025 11:40 AM Judy Alcaraz MD [id number] Academic   1/27/2025  4:00 PM Makenna Barraza MD [id number] Academic   3/25/2025  1:20 PM Nate Hyman MD [id number] Academic     ---------------------------------------------------------------------------------------------------  Subjective   Seen in hd. Tolerating without issues. Overall feeling better. No headache, sob, cp. She had spasms in her hands last night, better today. Still above dry weight. Concern about dietary and fluid adherence.      ---------------------------------------------------------------------------------------------------  Objective   Last Recorded Vitals  Blood pressure 139/53, pulse 95, temperature 36.6 °C (97.9 °F), temperature source Temporal, resp. rate 17, height 1.397 m (4' 7\"), weight 59.9 kg (132 lb), SpO2 96%.  Intake/Output last 3 Shifts:  I/O last 3 completed shifts:  In: 1750 (29.2 mL/kg) [P.O.:350; I.V.:1000 (16.7 mL/kg); Other:400]  Out: 2900 (48.4 mL/kg) [Other:2900]  Weight: 59.9 kg     Physical Exam  Vitals and nursing note reviewed.   Constitutional:       General: She is not in acute distress.     Appearance: Normal appearance. She is obese.   HENT:      Head: Normocephalic and atraumatic.      Mouth/Throat:      Mouth: Mucous membranes are moist.   Eyes:      General: No scleral icterus.     Extraocular Movements: Extraocular movements intact.      Conjunctiva/sclera: Conjunctivae normal.   Cardiovascular:      Rate and Rhythm: Normal rate and regular " rhythm.      Heart sounds: S1 normal and S2 normal. No murmur heard.  Pulmonary:      Effort: Pulmonary effort is normal. No respiratory distress.      Breath sounds: No wheezing, rhonchi or rales.      Comments: Diminished b/l  Abdominal:      General: Bowel sounds are normal. There is no distension.      Palpations: Abdomen is soft.      Tenderness: There is no abdominal tenderness. There is no guarding or rebound.   Musculoskeletal:         General: No swelling or deformity.      Cervical back: Neck supple.   Skin:     General: Skin is warm and dry.      Findings: No rash.   Neurological:      General: No focal deficit present.      Mental Status: She is alert and oriented to person, place, and time. Mental status is at baseline.      Sensory: No sensory deficit.      Motor: No weakness.   Psychiatric:         Mood and Affect: Mood normal.         Behavior: Behavior normal.         Relevant Results  Lab Results   Component Value Date    WBC 6.9 01/15/2025    HGB 9.4 (L) 01/15/2025    HCT 31.7 (L) 01/15/2025    MCV 96 01/15/2025     01/15/2025      Lab Results   Component Value Date    GLUCOSE 135 (H) 01/15/2025    CALCIUM 9.5 01/15/2025     (L) 01/15/2025    K 6.1 (HH) 01/15/2025    CO2 28 01/15/2025    CL 91 (L) 01/15/2025    BUN 72 (H) 01/15/2025    CREATININE 7.88 (H) 01/15/2025     Scheduled medications  acetaminophen, 975 mg, oral, q8h  ammonium lactate, , Topical, BID  apixaban, 5 mg, oral, BID  aspirin, 81 mg, oral, Daily  atorvastatin, 80 mg, oral, Nightly  bisacodyl, 10 mg, rectal, Daily  carvedilol, 25 mg, oral, BID  cloNIDine, 0.3 mg, oral, q8h BELEN  dilTIAZem CD, 360 mg, oral, Daily  epoetin joceline or biosimilar, 10,000 Units, intravenous, Once per day on Tuesday Thursday Saturday  fluticasone, 2 spray, Each Nostril, Daily  fluticasone furoate-vilanteroL, 1 puff, inhalation, Daily  gabapentin, 300 mg, oral, Nightly  hydrALAZINE, 100 mg, oral, TID  insulin lispro, 0-10 Units, subcutaneous,  TID AC  lidocaine, 1 patch, transdermal, Daily  melatonin, 6 mg, oral, Daily  metoclopramide, 5 mg, oral, q6h  oxygen, , inhalation, Continuous - Inhalation  perflutren lipid microspheres, 0.5-10 mL of dilution, intravenous, Once in imaging  polyethylene glycol, 17 g, oral, Daily  sennosides-docusate sodium, 1 tablet, oral, Nightly  sevelamer carbonate, 800 mg, oral, TID  sodium zirconium cyclosilicate, 10 g, oral, TID  torsemide, 20 mg, oral, 2 times per day on Sunday Tuesday Thursday Saturday  valsartan, 320 mg, oral, q PM  vitamin B complex-vitamin C-folic acid, 1 capsule, oral, Daily  white petrolatum, , Topical, BID      Continuous medications     PRN medications  PRN medications: albuterol, diclofenac sodium, dicyclomine, diphenhydrAMINE, ipratropium-albuteroL, lidocaine, ondansetron, pramoxine, sodium chloride, SUMAtriptan, traZODone    Al Painter MD

## 2025-01-15 NOTE — NURSING NOTE
.Report to Receiving RN:    Report To: PONCHO Gama  Time Report Called: 1035 via secure chat  Hand-Off Communication: Pt tolerated HD well with no concern, system clotted 24 min to the end of treatment, blood return. Fluid remove 2.3 Liter Post /65 HR 95  Complications During Treatment: No  Ultrafiltration Treatment: No  Medications Administered During Dialysis: No  Blood Products Administered During Dialysis: No  Labs Sent During Dialysis: No  Heparin Drip Rate Changes: No  Dialysis Catheter Dressing: AVF  Last Dressing Change: N/A

## 2025-01-15 NOTE — PROGRESS NOTES
"PSYCHIATRY CONSULT-LIAISON PROGRESS NOTE    SUBJECTIVE  The patient slept poorly last night, from 4am to 6am. She didn't take the trazodone last night because she didn't know to ask for it. She took a melatonin at 5:41pm but this didn't have any effect on her. She had a benadryl at midnight for itching (she has eczema). She hoped this would also make her sleepy but it didn't. She has been having pain in her hands from dialysis which keeps her up. She took some pain medication last night but still couldn't fall asleep because of anxiety. She thinks she saw a baby in the hallway but thinks this was just a dream.     ==========  Additional information: The patient did not receive trazodone last night. She had melatonin scheduled @1800 and benadryl 50mg PRN @0006 for itching.      OBJECTIVE    VITALS      1/14/2025     9:46 PM 1/14/2025    11:57 PM 1/15/2025     4:09 AM 1/15/2025     6:33 AM 1/15/2025    10:20 AM 1/15/2025    11:30 AM 1/15/2025    11:54 AM   Vitals   Systolic 147 128 139   93 171   Diastolic 57 52 53   69 70   Heart Rate  72 61 68 95 99 95   Temp  36.5 °C (97.7 °F) 36.5 °C (97.7 °F) 36.2 °C (97.2 °F) 36.6 °C (97.9 °F) 36.6 °C (97.9 °F) 36.6 °C (97.9 °F)   Resp  19 17   18         MENTAL STATUS EXAM  General: Female lying on bed comfortably in no acute distress.   Appearance: Appears stated age and Casually dressed  Attitude/Behavior: Cooperative, conversant, engaged, and with good eye contact.  Motor Activity: No psychomotor agitation or retardation. No abnormal movements, tremors or tics  Speech: Normal rate, tone and rhythm and coherent speech  Mood: \"okay.\"   Affect: Euthymic, full-range, congruent with mood  Thought Process: Linear, goal directed  Thought Content: Appropriate with no SI or HI. No perseverations or obsessions identified  Perception: Denies AVH. Does not appear to be internally stimulated  Cognition: Alert and oriented x4 to person, place, time, and situation  Insight: Fair, in " regards to understanding mental health condition  Judgement: Fair    CURRENT MEDICATIONS  Scheduled medications  acetaminophen, 975 mg, oral, q8h  ammonium lactate, , Topical, BID  apixaban, 5 mg, oral, BID  aspirin, 81 mg, oral, Daily  atorvastatin, 80 mg, oral, Nightly  bisacodyl, 10 mg, rectal, Daily  carvedilol, 25 mg, oral, BID  cloNIDine, 0.3 mg, oral, q8h BELEN  dilTIAZem CD, 360 mg, oral, Daily  epoetin joceline or biosimilar, 10,000 Units, intravenous, Once per day on Tuesday Thursday Saturday  fluticasone, 2 spray, Each Nostril, Daily  fluticasone furoate-vilanteroL, 1 puff, inhalation, Daily  gabapentin, 300 mg, oral, Nightly  hydrALAZINE, 100 mg, oral, TID  insulin lispro, 0-10 Units, subcutaneous, TID AC  lidocaine, 1 patch, transdermal, Daily  melatonin, 6 mg, oral, Daily  metoclopramide, 5 mg, oral, q6h  oxygen, , inhalation, Continuous - Inhalation  perflutren lipid microspheres, 0.5-10 mL of dilution, intravenous, Once in imaging  polyethylene glycol, 17 g, oral, Daily  sennosides-docusate sodium, 1 tablet, oral, Nightly  sevelamer carbonate, 800 mg, oral, TID  sodium zirconium cyclosilicate, 10 g, oral, TID  torsemide, 20 mg, oral, 2 times per day on Sunday Tuesday Thursday Saturday  valsartan, 320 mg, oral, q PM  vitamin B complex-vitamin C-folic acid, 1 capsule, oral, Daily  white petrolatum, , Topical, BID        Continuous medications       PRN medications  PRN medications: albuterol, diclofenac sodium, dicyclomine, diphenhydrAMINE, ipratropium-albuteroL, lidocaine, ondansetron, pramoxine, sodium chloride, SUMAtriptan, traZODone     LABS  Results for orders placed or performed during the hospital encounter of 12/29/24 (from the past 24 hours)   POCT GLUCOSE   Result Value Ref Range    POCT Glucose 304 (H) 74 - 99 mg/dL   POCT GLUCOSE   Result Value Ref Range    POCT Glucose 129 (H) 74 - 99 mg/dL   CBC   Result Value Ref Range    WBC 6.9 4.4 - 11.3 x10*3/uL    nRBC 0.0 0.0 - 0.0 /100 WBCs    RBC 3.29  (L) 4.00 - 5.20 x10*6/uL    Hemoglobin 9.4 (L) 12.0 - 16.0 g/dL    Hematocrit 31.7 (L) 36.0 - 46.0 %    MCV 96 80 - 100 fL    MCH 28.6 26.0 - 34.0 pg    MCHC 29.7 (L) 32.0 - 36.0 g/dL    RDW 15.2 (H) 11.5 - 14.5 %    Platelets 241 150 - 450 x10*3/uL   Renal Function Panel   Result Value Ref Range    Glucose 135 (H) 74 - 99 mg/dL    Sodium 134 (L) 136 - 145 mmol/L    Potassium 6.1 (HH) 3.5 - 5.3 mmol/L    Chloride 91 (L) 98 - 107 mmol/L    Bicarbonate 28 21 - 32 mmol/L    Anion Gap 21 (H) 10 - 20 mmol/L    Urea Nitrogen 72 (H) 6 - 23 mg/dL    Creatinine 7.88 (H) 0.50 - 1.05 mg/dL    eGFR 6 (L) >60 mL/min/1.73m*2    Calcium 9.5 8.6 - 10.6 mg/dL    Phosphorus 6.0 (H) 2.5 - 4.9 mg/dL    Albumin 3.6 3.4 - 5.0 g/dL     *Note: Due to a large number of results and/or encounters for the requested time period, some results have not been displayed. A complete set of results can be found in Results Review.        IMAGING  No results found.     PSYCHIATRIC RISK ASSESSMENT  Acute Risk of Harm to Others is Considered: Low  Acute Risk of Harm to Self is Considered: Low    ASSESSMENT AND PLAN  Stacey Heath is a 54 y.o. 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 Psychiatry was consulted on 1/10 for concern for hallucinations.     On initial assessment, patient is alert and oriented x4 to person, place, time and situation. Description of experience that was concerning for hallucinations likely related to current insomnia during this admission. Suspect that they are likely vivid dreams/nightmares. Discussed sleep hygiene and offered potential medication management of insomnia. Patient and her daughter are not interested in medication changes at this time. The patient later became interested  "in trying medications to help with sleep. She was started on trazodone 25mg at bedtime PRN.      Updates 1/15: The patient was informed to ask for trazodone tonight. Continue trazodone 50mg at bedtime PRN.     IMPRESSION  #Insomnia  #Nightmares     RECOMMENDATIONS  Safety:  - Patient does not currently meet criteria for inpatient psychiatric admission. - To evaluate decision-making capacity, recommend use of the Capacity Evaluation Tool. Search “[facility] Capacity Evaluation under SmartText\"   - Patient does not require a 1:1 sitter from a psychiatric perspective at this time.  - Defer to primary team decision for 1:1 sitter.  - As with all hospitalized patients, would recommend delirium precautions, as below.     Work-up:  -EKG (1/6/2025): QTc Fredericia of 400ms     Medications:  - CONTINUE trazodone to 50mg PO at bedtime PRN insomnia.      Follow-up:  - If there are ongoing sleep concerns or patient continues to endorse insomnia, recommend close follow-up with her PCP.     - Discussed recommendations with primary team.  - Psychiatry will continue to follow.      Please page [phone] with any questions or concerns.     Medication Consent  Medication Consent: n/a; consult service     RESIDENT ATTESTATION:  Patient discussed with Dr. Bello, who agrees with above plan which I have reviewed/edited.     Hazel Almaraz MD     "

## 2025-01-16 ENCOUNTER — APPOINTMENT (OUTPATIENT)
Dept: RADIOLOGY | Facility: HOSPITAL | Age: 55
DRG: 252 | End: 2025-01-16
Payer: COMMERCIAL

## 2025-01-16 ENCOUNTER — DOCUMENTATION (OUTPATIENT)
Dept: BEHAVIORAL HEALTH | Facility: CLINIC | Age: 55
End: 2025-01-16

## 2025-01-16 ENCOUNTER — APPOINTMENT (OUTPATIENT)
Dept: DIALYSIS | Facility: HOSPITAL | Age: 55
End: 2025-01-16
Payer: COMMERCIAL

## 2025-01-16 LAB
ALBUMIN SERPL BCP-MCNC: 3.9 G/DL (ref 3.4–5)
ANION GAP SERPL CALC-SCNC: 17 MMOL/L (ref 10–20)
BUN SERPL-MCNC: 16 MG/DL (ref 6–23)
CALCIUM SERPL-MCNC: 10 MG/DL (ref 8.6–10.6)
CHLORIDE SERPL-SCNC: 96 MMOL/L (ref 98–107)
CO2 SERPL-SCNC: 33 MMOL/L (ref 21–32)
CREAT SERPL-MCNC: 3.74 MG/DL (ref 0.5–1.05)
EGFRCR SERPLBLD CKD-EPI 2021: 14 ML/MIN/1.73M*2
ERYTHROCYTE [DISTWIDTH] IN BLOOD BY AUTOMATED COUNT: 14.8 % (ref 11.5–14.5)
GLUCOSE BLD MANUAL STRIP-MCNC: 120 MG/DL (ref 74–99)
GLUCOSE BLD MANUAL STRIP-MCNC: 134 MG/DL (ref 74–99)
GLUCOSE BLD MANUAL STRIP-MCNC: 147 MG/DL (ref 74–99)
GLUCOSE BLD MANUAL STRIP-MCNC: 156 MG/DL (ref 74–99)
GLUCOSE SERPL-MCNC: 152 MG/DL (ref 74–99)
HCT VFR BLD AUTO: 36.5 % (ref 36–46)
HGB BLD-MCNC: 10.9 G/DL (ref 12–16)
MCH RBC QN AUTO: 28.8 PG (ref 26–34)
MCHC RBC AUTO-ENTMCNC: 29.9 G/DL (ref 32–36)
MCV RBC AUTO: 97 FL (ref 80–100)
NRBC BLD-RTO: 0 /100 WBCS (ref 0–0)
PHOSPHATE SERPL-MCNC: 3.8 MG/DL (ref 2.5–4.9)
PLATELET # BLD AUTO: 235 X10*3/UL (ref 150–450)
POTASSIUM SERPL-SCNC: 3.9 MMOL/L (ref 3.5–5.3)
RBC # BLD AUTO: 3.78 X10*6/UL (ref 4–5.2)
SODIUM SERPL-SCNC: 142 MMOL/L (ref 136–145)
WBC # BLD AUTO: 5 X10*3/UL (ref 4.4–11.3)

## 2025-01-16 PROCEDURE — 2500000004 HC RX 250 GENERAL PHARMACY W/ HCPCS (ALT 636 FOR OP/ED): Performed by: STUDENT IN AN ORGANIZED HEALTH CARE EDUCATION/TRAINING PROGRAM

## 2025-01-16 PROCEDURE — 2500000001 HC RX 250 WO HCPCS SELF ADMINISTERED DRUGS (ALT 637 FOR MEDICARE OP): Performed by: STUDENT IN AN ORGANIZED HEALTH CARE EDUCATION/TRAINING PROGRAM

## 2025-01-16 PROCEDURE — 99233 SBSQ HOSP IP/OBS HIGH 50: CPT | Performed by: STUDENT IN AN ORGANIZED HEALTH CARE EDUCATION/TRAINING PROGRAM

## 2025-01-16 PROCEDURE — 36415 COLL VENOUS BLD VENIPUNCTURE: CPT | Performed by: STUDENT IN AN ORGANIZED HEALTH CARE EDUCATION/TRAINING PROGRAM

## 2025-01-16 PROCEDURE — 8010000001 HC DIALYSIS - HEMODIALYSIS PER DAY

## 2025-01-16 PROCEDURE — 85027 COMPLETE CBC AUTOMATED: CPT | Performed by: STUDENT IN AN ORGANIZED HEALTH CARE EDUCATION/TRAINING PROGRAM

## 2025-01-16 PROCEDURE — 2500000005 HC RX 250 GENERAL PHARMACY W/O HCPCS: Performed by: STUDENT IN AN ORGANIZED HEALTH CARE EDUCATION/TRAINING PROGRAM

## 2025-01-16 PROCEDURE — 82947 ASSAY GLUCOSE BLOOD QUANT: CPT

## 2025-01-16 PROCEDURE — 2500000002 HC RX 250 W HCPCS SELF ADMINISTERED DRUGS (ALT 637 FOR MEDICARE OP, ALT 636 FOR OP/ED): Performed by: STUDENT IN AN ORGANIZED HEALTH CARE EDUCATION/TRAINING PROGRAM

## 2025-01-16 PROCEDURE — 80069 RENAL FUNCTION PANEL: CPT | Performed by: STUDENT IN AN ORGANIZED HEALTH CARE EDUCATION/TRAINING PROGRAM

## 2025-01-16 PROCEDURE — 99233 SBSQ HOSP IP/OBS HIGH 50: CPT | Performed by: INTERNAL MEDICINE

## 2025-01-16 PROCEDURE — 74018 RADEX ABDOMEN 1 VIEW: CPT

## 2025-01-16 PROCEDURE — 74018 RADEX ABDOMEN 1 VIEW: CPT | Performed by: RADIOLOGY

## 2025-01-16 PROCEDURE — 1200000002 HC GENERAL ROOM WITH TELEMETRY DAILY

## 2025-01-16 RX ORDER — KETOROLAC TROMETHAMINE 30 MG/ML
7.5 INJECTION, SOLUTION INTRAMUSCULAR; INTRAVENOUS ONCE
Status: COMPLETED | OUTPATIENT
Start: 2025-01-16 | End: 2025-01-16

## 2025-01-16 RX ORDER — PANTOPRAZOLE SODIUM 40 MG/1
40 TABLET, DELAYED RELEASE ORAL
Status: DISCONTINUED | OUTPATIENT
Start: 2025-01-17 | End: 2025-01-27 | Stop reason: HOSPADM

## 2025-01-16 RX ORDER — CEPHALEXIN 500 MG/1
500 CAPSULE ORAL EVERY 12 HOURS SCHEDULED
Status: DISCONTINUED | OUTPATIENT
Start: 2025-01-16 | End: 2025-01-27 | Stop reason: HOSPADM

## 2025-01-16 RX ADMIN — APIXABAN 5 MG: 5 TABLET, FILM COATED ORAL at 20:47

## 2025-01-16 RX ADMIN — ACETAMINOPHEN 975 MG: 325 TABLET, FILM COATED ORAL at 20:48

## 2025-01-16 RX ADMIN — APIXABAN 5 MG: 5 TABLET, FILM COATED ORAL at 12:04

## 2025-01-16 RX ADMIN — GABAPENTIN 300 MG: 300 CAPSULE ORAL at 20:46

## 2025-01-16 RX ADMIN — CEPHALEXIN 500 MG: 500 CAPSULE ORAL at 20:46

## 2025-01-16 RX ADMIN — TORSEMIDE 20 MG: 20 TABLET ORAL at 12:05

## 2025-01-16 RX ADMIN — RENO CAPS 1 CAPSULE: 100; 1.5; 1.7; 20; 10; 1; 150; 5; 6 CAPSULE ORAL at 12:05

## 2025-01-16 RX ADMIN — METOCLOPRAMIDE 5 MG: 10 TABLET ORAL at 20:37

## 2025-01-16 RX ADMIN — KETOROLAC TROMETHAMINE 7.5 MG: 30 INJECTION, SOLUTION INTRAMUSCULAR; INTRAVENOUS at 01:01

## 2025-01-16 RX ADMIN — ATORVASTATIN CALCIUM 80 MG: 80 TABLET, FILM COATED ORAL at 20:47

## 2025-01-16 RX ADMIN — LIDOCAINE 4% 1 PATCH: 40 PATCH TOPICAL at 12:04

## 2025-01-16 RX ADMIN — HYDRALAZINE HYDROCHLORIDE 100 MG: 50 TABLET ORAL at 00:17

## 2025-01-16 RX ADMIN — SODIUM ZIRCONIUM CYCLOSILICATE 10 G: 10 POWDER, FOR SUSPENSION ORAL at 15:59

## 2025-01-16 RX ADMIN — DICYCLOMINE HYDROCHLORIDE 10 MG: 10 CAPSULE ORAL at 00:18

## 2025-01-16 RX ADMIN — HYDRALAZINE HYDROCHLORIDE 100 MG: 50 TABLET ORAL at 15:59

## 2025-01-16 RX ADMIN — Medication: at 21:01

## 2025-01-16 RX ADMIN — SODIUM ZIRCONIUM CYCLOSILICATE 10 G: 10 POWDER, FOR SUSPENSION ORAL at 12:06

## 2025-01-16 RX ADMIN — SEVELAMER CARBONATE 800 MG: 800 TABLET, FILM COATED ORAL at 12:05

## 2025-01-16 RX ADMIN — INSULIN LISPRO 2 UNITS: 100 INJECTION, SOLUTION INTRAVENOUS; SUBCUTANEOUS at 19:21

## 2025-01-16 RX ADMIN — ASPIRIN 81 MG: 81 TABLET, COATED ORAL at 12:05

## 2025-01-16 RX ADMIN — PETROLATUM: 420 OINTMENT TOPICAL at 21:01

## 2025-01-16 RX ADMIN — CARVEDILOL 25 MG: 12.5 TABLET, FILM COATED ORAL at 12:04

## 2025-01-16 RX ADMIN — CLONIDINE HYDROCHLORIDE 0.3 MG: 0.1 TABLET ORAL at 16:00

## 2025-01-16 RX ADMIN — TORSEMIDE 20 MG: 20 TABLET ORAL at 20:47

## 2025-01-16 RX ADMIN — METOCLOPRAMIDE 5 MG: 10 TABLET ORAL at 15:59

## 2025-01-16 RX ADMIN — MELATONIN 6 MG: 3 TAB ORAL at 20:43

## 2025-01-16 RX ADMIN — CLONIDINE HYDROCHLORIDE 0.3 MG: 0.1 TABLET ORAL at 00:17

## 2025-01-16 RX ADMIN — DILTIAZEM HYDROCHLORIDE 360 MG: 180 CAPSULE, COATED, EXTENDED RELEASE ORAL at 12:05

## 2025-01-16 RX ADMIN — METOCLOPRAMIDE 5 MG: 10 TABLET ORAL at 12:06

## 2025-01-16 ASSESSMENT — PAIN SCALES - PAIN ASSESSMENT IN ADVANCED DEMENTIA (PAINAD)
TOTALSCORE: MEDICATION (SEE MAR)
BREATHING: NORMAL

## 2025-01-16 ASSESSMENT — PAIN DESCRIPTION - ORIENTATION: ORIENTATION: UPPER

## 2025-01-16 ASSESSMENT — PAIN - FUNCTIONAL ASSESSMENT
PAIN_FUNCTIONAL_ASSESSMENT: 0-10
PAIN_FUNCTIONAL_ASSESSMENT: NO/DENIES PAIN

## 2025-01-16 ASSESSMENT — PAIN SCALES - GENERAL
PAINLEVEL_OUTOF10: 10 - WORST POSSIBLE PAIN
PAINLEVEL_OUTOF10: 0 - NO PAIN
PAINLEVEL_OUTOF10: 5 - MODERATE PAIN
PAINLEVEL_OUTOF10: 5 - MODERATE PAIN
PAINLEVEL_OUTOF10: 0 - NO PAIN

## 2025-01-16 ASSESSMENT — PAIN DESCRIPTION - DESCRIPTORS: DESCRIPTORS: SHARP

## 2025-01-16 ASSESSMENT — PAIN DESCRIPTION - LOCATION
LOCATION: ABDOMEN
LOCATION: ABDOMEN

## 2025-01-16 NOTE — PROGRESS NOTES
"PSYCHIATRY CONSULT-LIAISON PROGRESS NOTE    SUBJECTIVE  The patient's affect is bright this morning. Her daughter was visiting her. The patient excitedly says that she is going to be a grandmother. She says she slept well last night. She got 5 hrs of sleep and feels refreshed this morning. She didn't have any vivid dreams last night. She thanks this writer for helping with her sleep.     ==========  Additional information: trazodone @2240    OBJECTIVE    VITALS      1/15/2025     8:55 PM 1/15/2025    11:44 PM 1/16/2025    12:16 AM 1/16/2025     4:33 AM 1/16/2025     6:20 AM 1/16/2025    10:45 AM 1/16/2025    11:54 AM   Vitals   Systolic 159 172 160 159   155   Diastolic 58 64 66 66   79   Heart Rate 71 76 74 85 82 86 107   Temp  36.7 °C (98.1 °F)  36.8 °C (98.2 °F) 36 °C (96.8 °F) 36 °C (96.8 °F) 36.5 °C (97.7 °F)   Resp 18      18        MENTAL STATUS EXAM  General: Female sitting on edge of bed comfortably in no acute distress.   Appearance: Appears stated age and Casually dressed  Attitude/Behavior: Cooperative, conversant, engaged, and with good eye contact.  Motor Activity: No psychomotor agitation or retardation. No abnormal movements, tremors or tics  Speech: Normal rate, tone and rhythm and coherent speech  Mood: \"the same.\"   Affect: Euthymic, bright, full-range, congruent with mood  Thought Process: Linear, goal directed  Thought Content: Appropriate with no SI or HI. No perseverations or obsessions identified  Perception: Denies AVH. Does not appear to be internally stimulated  Cognition: Alert and oriented x4 to person, place, time, and situation  Insight: Fair, in regards to understanding mental health condition  Judgement: Fair    CURRENT MEDICATIONS  Scheduled medications  acetaminophen, 975 mg, oral, q8h  ammonium lactate, , Topical, BID  apixaban, 5 mg, oral, BID  aspirin, 81 mg, oral, Daily  atorvastatin, 80 mg, oral, Nightly  bisacodyl, 10 mg, rectal, Daily  carvedilol, 25 mg, oral, " BID  cloNIDine, 0.3 mg, oral, q8h BELEN  dilTIAZem CD, 360 mg, oral, Daily  epoetin joceline or biosimilar, 10,000 Units, intravenous, Once per day on Tuesday Thursday Saturday  fluticasone, 2 spray, Each Nostril, Daily  fluticasone furoate-vilanteroL, 1 puff, inhalation, Daily  gabapentin, 300 mg, oral, Nightly  hydrALAZINE, 100 mg, oral, TID  insulin lispro, 0-10 Units, subcutaneous, TID AC  lidocaine, 1 patch, transdermal, Daily  melatonin, 6 mg, oral, Daily  metoclopramide, 5 mg, oral, q6h  oxygen, , inhalation, Continuous - Inhalation  perflutren lipid microspheres, 0.5-10 mL of dilution, intravenous, Once in imaging  polyethylene glycol, 17 g, oral, Daily  sennosides-docusate sodium, 1 tablet, oral, Nightly  sevelamer carbonate, 800 mg, oral, TID  sodium zirconium cyclosilicate, 10 g, oral, TID  torsemide, 20 mg, oral, 2 times per day on Sunday Tuesday Thursday Saturday  valsartan, 320 mg, oral, q PM  vitamin B complex-vitamin C-folic acid, 1 capsule, oral, Daily  white petrolatum, , Topical, BID        Continuous medications       PRN medications  PRN medications: albuterol, diclofenac sodium, dicyclomine, diphenhydrAMINE, ipratropium-albuteroL, lidocaine, lidocaine-diphenhydraMINE-Maalox 1:1:1, ondansetron, pramoxine, sodium chloride, SUMAtriptan, traZODone     LABS  Results for orders placed or performed during the hospital encounter of 12/29/24 (from the past 24 hours)   POCT GLUCOSE   Result Value Ref Range    POCT Glucose 165 (H) 74 - 99 mg/dL   POCT GLUCOSE   Result Value Ref Range    POCT Glucose 144 (H) 74 - 99 mg/dL   POCT GLUCOSE   Result Value Ref Range    POCT Glucose 238 (H) 74 - 99 mg/dL   POCT GLUCOSE   Result Value Ref Range    POCT Glucose 134 (H) 74 - 99 mg/dL   POCT GLUCOSE   Result Value Ref Range    POCT Glucose 120 (H) 74 - 99 mg/dL     *Note: Due to a large number of results and/or encounters for the requested time period, some results have not been displayed. A complete set of results can be  "found in Results Review.        IMAGING  No results found.     PSYCHIATRIC RISK ASSESSMENT  Acute Risk of Harm to Others is Considered: Low  Acute Risk of Harm to Self is Considered: Low    ASSESSMENT AND PLAN  Stacey Heath is a 54 y.o. 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 Psychiatry was consulted on 1/10 for concern for hallucinations.     On initial assessment, patient is alert and oriented x4 to person, place, time and situation. Description of experience that was concerning for hallucinations likely related to current insomnia during this admission. Suspect that they are likely vivid dreams/nightmares. Discussed sleep hygiene and offered potential medication management of insomnia. Patient and her daughter are not interested in medication changes at this time. The patient later became interested in trying medications to help with sleep. She was started on trazodone 25mg at bedtime PRN which was increased to 50mg PRN.      Updates 1/16: The patient received trazodone 50mg PRN last night and slept much better. She denies any vivid dreams. Her affect is bright and she denies any other psychiatric symptoms. Continue trazodone 50mg at bedtime PRN.      IMPRESSION  #Insomnia  #Nightmares     RECOMMENDATIONS  Safety:  - Patient does not currently meet criteria for inpatient psychiatric admission. - To evaluate decision-making capacity, recommend use of the Capacity Evaluation Tool. Search “ IP Capacity Evaluation under SmartText\"   - Patient does not require a 1:1 sitter from a psychiatric perspective at this time.  - Defer to primary team decision for 1:1 sitter.  - As with all hospitalized patients, would recommend delirium precautions, as below.   "   Work-up:  -EKG (1/6/2025): QTc Fredericia of 400ms     Medications:  - CONTINUE trazodone to 50mg PO at bedtime PRN insomnia.      Follow-up:  - If there are ongoing sleep concerns or patient continues to endorse insomnia, recommend close follow-up with her PCP.     - Discussed recommendations with primary team.  - Psychiatry will sign off.      Please page [phone] with any questions or concerns.       Medication Consent  Medication Consent: n/a; consult service     RESIDENT ATTESTATION:  Patient discussed with Dr. Crabtree, who agrees with above plan which I have reviewed/edited.     Hazel Almaraz MD

## 2025-01-16 NOTE — NURSING NOTE
Report to Receiving RN:    Report To: PONCHO Solo  Time Report Called: 1314  Hand-Off Communication: Tolerated HD Tx well. Removed 2.3 L. Post VS: 150/67, 85. AOX4  Complications During Treatment: No  Ultrafiltration Treatment: Yes  Medications Administered During Dialysis: No  Blood Products Administered During Dialysis: N/A  Labs Sent During Dialysis: No  Heparin Drip Rate Changes: N/A  Dialysis Catheter Dressing: N/A  Last Dressing Change: N/A    Last Updated: 10:45 AM by KEY EARLY

## 2025-01-16 NOTE — NURSING NOTE
"Patient alert and oriented x4. She is calm and cooperative to care. On room air, no complains of difficulty breathing or shortness of breath. Patient denies any chest pain, chest discomfort, dizziness or lightheadedness. She ambulates steadily around the room. AVF clean dry and intact. Not in any distress.    1/15/25 2200 - patient complained that her right breast is tender and has mild swelling.     1/15/25 2230- Inspection done with Yen ISAAC, one of the female RN on duty. Mild swelling on right breast compared to left.     1/15/25 2251- Notified hospitalist DO KAITLYNN Chavez. Provider replied \"Ok I believe we have heard this from her before and her provider will check again tomorrow\"    1/16/25 0000- Patient complained of sharp upper abdominal pain, pain score of 10/10. Pain does not radiate anywhere. Per patient it does not feel like it is gas pain or acid reflux. PRN for abdominal discomfort/cramping given but per patient it is not helping.    1/16/25 0040-  Provider was notified. See orders.    1/16/25 0600-  patient is awake and alert. No complains made. She said her abdominal pain is gone and she is feeling so much better. Patient denies any chest pain, chest discomfort, dizziness or lightheadedness. She denies any respiratory difficulties. Patient is aware that she will be having another dialysis session today. Report given to HD nurse. Per HD nurse, hold 6am dose of clonidine, they will call unit if the dose is needed depending on the patients BP.     "

## 2025-01-16 NOTE — CARE PLAN
Problem: Discharge Planning  Goal: Discharge to home or other facility with appropriate resources  Outcome: Progressing     Problem: Pain  Goal: Takes deep breaths with improved pain control throughout the shift  Outcome: Progressing  Goal: Turns in bed with improved pain control throughout the shift  Outcome: Progressing  Goal: Walks with improved pain control throughout the shift  Outcome: Progressing  Goal: Performs ADL's with improved pain control throughout shift  Outcome: Progressing   The patient's goals for the shift include      The clinical goals for the shift include Patient will remain hemodynamically stable. BP will remain within normal/acceptable range

## 2025-01-16 NOTE — PROGRESS NOTES
Assessment/Plan   Stacey Heath is a 54 y.o. female PMH significant for ESRD 2/2 on dialysis T/Th/Sat via RUE AVF (last complete session 01/04/2025), HTN, HFpEF, COPD, brachial DVT on Eliquis who presented for [facility] ED for acute onset shortness of breath. Pt who was admitted to the MICU on 12/29/24 for hypertensive emergency c/b flash pulmonary edema. She has recurrent admissions related to her dialysis and breathing. She was treated with cardene gtt and received UF and HD with nephrology. cardene gtt was weaned and patient resumed home anti-HTN regimen. The patient was transferred to medicine floor but had recurrent hypertensive emergency with bp to 270s requiring cardene infusion to resume. Has had severe sessions of HD and UF, planning to continue until closer to dry weight of 50kg. She had adjustments to her home antihypertensives.      Hypertensive Emergency - resolved  HFpEF, acute on chronic, resolved  - weaned off cardene drip  - still with elevated blood pressures, better controlled.   - TTE 01/03/2025 showed EF of 50-55 with left atrium severely dilated,      Mod-severe mitral annular calcification. Severely increased concentric systolic function. Dialyzing until closer to dry weight  - concern for hypotension given small LV size if pt becomes fluid depleted.     Abd discomfort  - check kub  - add ppi  -reports last bm yesterday, passing gas    R breast swelling  - with warm, edema and ttp, no erythema or drainage  - starting keflex.   - examination with nursing chaperone.     ESRD   - Dialysis T/Th/Sat   - continue HD/UF until closer to dry weight of 50kg per nephrology, currently 57kg after HD.   - may need bp dose reductions as closer to dry weight  -  hold hydralazine and clonidine the morning of Dialysis.    - Concern about dietary and fluid adherance.     Chronic deep vein thrombosis (DVT) of brachial vein of left upper extremity   - Continue with Eliquis     Headache likely due to migraine vs  "Hypertensive emergency  - improved    COPD   -Continue Albuterol neb q6hr prn     Insomnia   - Melatonin and trazodone prn    Acute on chronic anemia   - in setting of esrd  - no bleeding noted  - monitor    DMII with gastroparesis  - continue reglan and zofran prn  - continue bowel regimen  - no longer on medications for dm in setting of her esrd    Continuing goals of care, consulted palliative care. Nephrology planning goc meeting with behavioral jonah and would like to involve palliative in goc.         Scheduled outpatient appointments in system:   Future Appointments   Date Time Provider Department Center   1/17/2025 11:00 AM Bess Hurst RD [facility]   1/17/2025 11:40 AM Judy Alcaraz MD [id number] Academic   1/27/2025  4:00 PM Makenna Barraza MD [id number] Academic   3/25/2025  1:20 PM Nate Hyman MD [id number] Academic     ---------------------------------------------------------------------------------------------------  Subjective   Seen after hd, reports no issues with hd. States she is getting some abd discomfort, no n/v/diarrhea. Also noted sensitivity and swelling of her right breast. No f/c. She is having further goc discussins today.      ---------------------------------------------------------------------------------------------------  Objective   Last Recorded Vitals  Blood pressure 155/79, pulse 107, temperature 36.5 °C (97.7 °F), temperature source Temporal, resp. rate 18, height 1.397 m (4' 7\"), weight 59.9 kg (132 lb), SpO2 97%.  Intake/Output last 3 Shifts:  I/O last 3 completed shifts:  In: 2100 (35.1 mL/kg) [P.O.:500; I.V.:1200 (20 mL/kg); Other:400]  Out: 2703 (45.1 mL/kg) [Other:2703]  Weight: 59.9 kg     Physical Exam  Vitals and nursing note reviewed.   Constitutional:       General: She is not in acute distress.     Appearance: Normal appearance. She is obese.   HENT:      Head: Normocephalic and atraumatic.      Mouth/Throat:      Mouth: Mucous membranes are " moist.   Eyes:      General: No scleral icterus.     Extraocular Movements: Extraocular movements intact.      Conjunctiva/sclera: Conjunctivae normal.   Cardiovascular:      Rate and Rhythm: Normal rate and regular rhythm.      Heart sounds: S1 normal and S2 normal. No murmur heard.  Pulmonary:      Effort: Pulmonary effort is normal. No respiratory distress.      Breath sounds: No wheezing, rhonchi or rales.      Comments: Diminished b/l  Abdominal:      General: Bowel sounds are normal. There is no distension.      Palpations: Abdomen is soft.      Tenderness: There is no abdominal tenderness. There is no guarding or rebound.   Musculoskeletal:         General: No swelling or deformity.      Cervical back: Neck supple.      Comments: R breast swelling, ttp, warmth noted.  no erythema or drainage   Skin:     General: Skin is warm and dry.      Findings: No rash.   Neurological:      General: No focal deficit present.      Mental Status: She is alert and oriented to person, place, and time. Mental status is at baseline.      Sensory: No sensory deficit.      Motor: No weakness.   Psychiatric:         Mood and Affect: Mood normal.         Behavior: Behavior normal.         Relevant Results  Lab Results   Component Value Date    WBC 6.9 01/15/2025    HGB 9.4 (L) 01/15/2025    HCT 31.7 (L) 01/15/2025    MCV 96 01/15/2025     01/15/2025      Lab Results   Component Value Date    GLUCOSE 135 (H) 01/15/2025    CALCIUM 9.5 01/15/2025     (L) 01/15/2025    K 6.1 (HH) 01/15/2025    CO2 28 01/15/2025    CL 91 (L) 01/15/2025    BUN 72 (H) 01/15/2025    CREATININE 7.88 (H) 01/15/2025     Scheduled medications  acetaminophen, 975 mg, oral, q8h  ammonium lactate, , Topical, BID  apixaban, 5 mg, oral, BID  aspirin, 81 mg, oral, Daily  atorvastatin, 80 mg, oral, Nightly  bisacodyl, 10 mg, rectal, Daily  carvedilol, 25 mg, oral, BID  cloNIDine, 0.3 mg, oral, q8h BELEN  dilTIAZem CD, 360 mg, oral, Daily  epoetin joceline or  biosimilar, 10,000 Units, intravenous, Once per day on Tuesday Thursday Saturday  fluticasone, 2 spray, Each Nostril, Daily  fluticasone furoate-vilanteroL, 1 puff, inhalation, Daily  gabapentin, 300 mg, oral, Nightly  hydrALAZINE, 100 mg, oral, TID  insulin lispro, 0-10 Units, subcutaneous, TID AC  lidocaine, 1 patch, transdermal, Daily  melatonin, 6 mg, oral, Daily  metoclopramide, 5 mg, oral, q6h  oxygen, , inhalation, Continuous - Inhalation  perflutren lipid microspheres, 0.5-10 mL of dilution, intravenous, Once in imaging  polyethylene glycol, 17 g, oral, Daily  sennosides-docusate sodium, 1 tablet, oral, Nightly  sevelamer carbonate, 800 mg, oral, TID  sodium zirconium cyclosilicate, 10 g, oral, TID  torsemide, 20 mg, oral, 2 times per day on Sunday Tuesday Thursday Saturday  valsartan, 320 mg, oral, q PM  vitamin B complex-vitamin C-folic acid, 1 capsule, oral, Daily  white petrolatum, , Topical, BID      Continuous medications     PRN medications  PRN medications: albuterol, diclofenac sodium, dicyclomine, diphenhydrAMINE, ipratropium-albuteroL, lidocaine, lidocaine-diphenhydraMINE-Maalox 1:1:1, ondansetron, pramoxine, sodium chloride, SUMAtriptan, traZODone    Al Painter MD

## 2025-01-16 NOTE — PROGRESS NOTES
Nephrology Follow-up Note   Patient ID: Stacey Heath is a 54 y.o. female.   Admitted for :   Chief Complaint   Patient presents with    Shortness of Breath      Evaluation of patient on dialysis at [facility]  [address]  [facility] [address] on 12/29/2024  Seen and examined during hemodialysis. Labs and events reviewed.      Subjective:   Feels OK, no SOB   Was able to walk the hallway yesterday without dyspnea   No c/o related to HD   New complaint about right breast tenderness  Pre HD weight 57.6Kg     Patient Active Problem List   Diagnosis    Anxiety    Astigmatism    Carotid bruit    Diabetes mellitus type 2, uncomplicated (Multi)    Coronary artery disease involving native coronary artery    Iron deficiency anemia    Migraine    Neuropathy    Transplant    Polyneuropathy due to type 2 diabetes mellitus (Multi)    Nuclear senile cataract of both eyes    Normocytic normochromic anemia    Low back pain    Hidradenitis    Complex dyslipidemia    Chronic heart failure with preserved ejection fraction (HFpEF)    Malnutrition of moderate degree (Multi)    Kidney transplant candidate    Nonrheumatic mitral valve regurgitation    Chronic deep vein thrombosis (DVT) of brachial vein of left upper extremity (Multi)    Acute diastolic CHF (congestive heart failure)    Hypertensive emergency    Resistant hypertension    Congestive heart failure    ESRD (end stage renal disease) on dialysis (Multi)    Flash pulmonary edema    Sleep-related breathing disorder    Congestive heart failure, unspecified HF chronicity, unspecified heart failure type    COPD exacerbation (Multi)       Scheduled medications:  acetaminophen, 975 mg, oral, q8h  ammonium lactate, , Topical, BID  apixaban, 5 mg, oral, BID  aspirin, 81 mg, oral, Daily  atorvastatin, 80 mg, oral, Nightly  bisacodyl, 10 mg, rectal, Daily  carvedilol, 25 mg, oral, BID  cloNIDine, 0.3 mg, oral, q8h BELEN  dilTIAZem CD, 360 mg, oral,  Daily  epoetin joceline or biosimilar, 10,000 Units, intravenous, Once per day on Tuesday Thursday Saturday  fluticasone, 2 spray, Each Nostril, Daily  fluticasone furoate-vilanteroL, 1 puff, inhalation, Daily  gabapentin, 300 mg, oral, Nightly  hydrALAZINE, 100 mg, oral, TID  insulin lispro, 0-10 Units, subcutaneous, TID AC  lidocaine, 1 patch, transdermal, Daily  melatonin, 6 mg, oral, Daily  metoclopramide, 5 mg, oral, q6h  oxygen, , inhalation, Continuous - Inhalation  perflutren lipid microspheres, 0.5-10 mL of dilution, intravenous, Once in imaging  polyethylene glycol, 17 g, oral, Daily  sennosides-docusate sodium, 1 tablet, oral, Nightly  sevelamer carbonate, 800 mg, oral, TID  sodium zirconium cyclosilicate, 10 g, oral, TID  torsemide, 20 mg, oral, 2 times per day on Sunday Tuesday Thursday Saturday  valsartan, 320 mg, oral, q PM  vitamin B complex-vitamin C-folic acid, 1 capsule, oral, Daily  white petrolatum, , Topical, BID         PRN medications: albuterol, diclofenac sodium, dicyclomine, diphenhydrAMINE, ipratropium-albuteroL, lidocaine, lidocaine-diphenhydraMINE-Maalox 1:1:1, ondansetron, pramoxine, sodium chloride, SUMAtriptan, traZODone     Heart Rate:  [71-99]   Temp:  [36 °C (96.8 °F)-36.8 °C (98.2 °F)]   Resp:  [16-18]   BP: ()/(58-80)   SpO2:  [94 %-99 %]    Weight: 55.3 kg (122 lb)   Gen: alert, NAD  HEENT: NC/AT  Neck: supple  Right breast: no nodules palpable, no rash ; tender around the areola   Pulm: no rhonchi or rales, no wheezing    CVS: RRR, no rub  Abd: S/NT/ND  LE: no edema , no cyanosis   Dialysis acces:  RUEAVF     Lab Results   Component Value Date    WBC 6.9 01/15/2025    HGB 9.4 (L) 01/15/2025    HCT 31.7 (L) 01/15/2025    MCV 96 01/15/2025     01/15/2025     Lab Results   Component Value Date    GLUCOSE 135 (H) 01/15/2025    CALCIUM 9.5 01/15/2025     (L) 01/15/2025    K 6.1 (HH) 01/15/2025    CO2 28 01/15/2025    CL 91 (L) 01/15/2025    BUN 72 (H) 01/15/2025     CREATININE 7.88 (H) 01/15/2025     Results from last 72 hours   Lab Units 01/15/25  0516   ALBUMIN g/dL 3.6   GLUCOSE mg/dL 135*   HEMOGLOBIN g/dL 9.4*   WBC AUTO x10*3/uL 6.9      Results from last 72 hours   Lab Units 01/15/25  0516   SODIUM mmol/L 134*   POTASSIUM mmol/L 6.1*   CO2 mmol/L 28   BUN mg/dL 72*   CREATININE mg/dL 7.88*   PHOSPHORUS mg/dL 6.0*   CALCIUM mg/dL 9.5        Assessment   ESRD-HD TTS admitted with SOB / hypervolemia   HFpEF; severe calcified MV    Labs pending this AM     Plan   Plan HD per submitted orders, UF  2L as tolerated  MBD - Phosphorus binder: continue with Sevelamer   Anemia of CKD: EPO to be given x 3 per week   Access: no issues   BP: acceptable during treatment   Renal Diet   Daily renal MVI   Continue 3 x per week hemodialysis; TTS  Haydee Mosher MD MPH

## 2025-01-16 NOTE — NURSING NOTE
Report from Sending RN:    Report From: Madi  Recent Surgery of Procedure: No  Baseline Level of Consciousness (LOC): A+Ox4  Oxygen Use: No  Type: ra  Diabetic: Yes  Last BP Med Given Day of Dialysis: none  Last Pain Med Given: non  Lab Tests to be Obtained with Dialysis: Yes  Blood Transfusion to be Given During Dialysis: N/A  Available IV Access: Yes  Medications to be Administered During Dialysis: No  Continuous IV Infusion Running: No  Restraints on Currently or in the Last 24 Hours: No  Hand-Off Communication: Hold clonidine this am prior to dialysis, vss. No overnight events.   Dialysis Catheter Dressing: n/a  Last Dressing Change: n/a

## 2025-01-17 ENCOUNTER — APPOINTMENT (OUTPATIENT)
Dept: DIALYSIS | Facility: HOSPITAL | Age: 55
End: 2025-01-17
Payer: COMMERCIAL

## 2025-01-17 ENCOUNTER — TELEMEDICINE CLINICAL SUPPORT (OUTPATIENT)
Dept: CARE COORDINATION | Facility: CLINIC | Age: 55
End: 2025-01-17
Payer: COMMERCIAL

## 2025-01-17 ENCOUNTER — DOCUMENTATION (OUTPATIENT)
Dept: BEHAVIORAL HEALTH | Facility: CLINIC | Age: 55
End: 2025-01-17

## 2025-01-17 ENCOUNTER — APPOINTMENT (OUTPATIENT)
Dept: ENDOCRINOLOGY | Facility: CLINIC | Age: 55
End: 2025-01-17
Payer: COMMERCIAL

## 2025-01-17 LAB
ALBUMIN SERPL BCP-MCNC: 3.8 G/DL (ref 3.4–5)
ANION GAP SERPL CALC-SCNC: 21 MMOL/L (ref 10–20)
ATRIAL RATE: 70 BPM
ATRIAL RATE: 90 BPM
BUN SERPL-MCNC: 38 MG/DL (ref 6–23)
CALCIUM SERPL-MCNC: 9.7 MG/DL (ref 8.6–10.6)
CHLORIDE SERPL-SCNC: 93 MMOL/L (ref 98–107)
CO2 SERPL-SCNC: 27 MMOL/L (ref 21–32)
CREAT SERPL-MCNC: 5.77 MG/DL (ref 0.5–1.05)
EGFRCR SERPLBLD CKD-EPI 2021: 8 ML/MIN/1.73M*2
ERYTHROCYTE [DISTWIDTH] IN BLOOD BY AUTOMATED COUNT: 14.8 % (ref 11.5–14.5)
GLUCOSE BLD MANUAL STRIP-MCNC: 127 MG/DL (ref 74–99)
GLUCOSE BLD MANUAL STRIP-MCNC: 222 MG/DL (ref 74–99)
GLUCOSE BLD MANUAL STRIP-MCNC: 278 MG/DL (ref 74–99)
GLUCOSE SERPL-MCNC: 262 MG/DL (ref 74–99)
HCT VFR BLD AUTO: 36.2 % (ref 36–46)
HGB BLD-MCNC: 10.8 G/DL (ref 12–16)
MCH RBC QN AUTO: 29 PG (ref 26–34)
MCHC RBC AUTO-ENTMCNC: 29.8 G/DL (ref 32–36)
MCV RBC AUTO: 97 FL (ref 80–100)
NRBC BLD-RTO: 0 /100 WBCS (ref 0–0)
P AXIS: 46 DEGREES
P AXIS: 55 DEGREES
P OFFSET: 185 MS
P OFFSET: 185 MS
P ONSET: 134 MS
P ONSET: 135 MS
PHOSPHATE SERPL-MCNC: 5.4 MG/DL (ref 2.5–4.9)
PLATELET # BLD AUTO: 251 X10*3/UL (ref 150–450)
POTASSIUM SERPL-SCNC: 4.6 MMOL/L (ref 3.5–5.3)
PR INTERVAL: 164 MS
PR INTERVAL: 168 MS
Q ONSET: 217 MS
Q ONSET: 218 MS
QRS COUNT: 12 BEATS
QRS COUNT: 15 BEATS
QRS DURATION: 86 MS
QRS DURATION: 92 MS
QT INTERVAL: 364 MS
QT INTERVAL: 380 MS
QTC CALCULATION(BAZETT): 410 MS
QTC CALCULATION(BAZETT): 445 MS
QTC FREDERICIA: 400 MS
QTC FREDERICIA: 416 MS
R AXIS: 17 DEGREES
R AXIS: 24 DEGREES
RBC # BLD AUTO: 3.72 X10*6/UL (ref 4–5.2)
SODIUM SERPL-SCNC: 136 MMOL/L (ref 136–145)
T AXIS: 176 DEGREES
T AXIS: 186 DEGREES
T OFFSET: 399 MS
T OFFSET: 408 MS
VENTRICULAR RATE: 70 BPM
VENTRICULAR RATE: 90 BPM
WBC # BLD AUTO: 6.9 X10*3/UL (ref 4.4–11.3)

## 2025-01-17 PROCEDURE — 2500000001 HC RX 250 WO HCPCS SELF ADMINISTERED DRUGS (ALT 637 FOR MEDICARE OP): Performed by: STUDENT IN AN ORGANIZED HEALTH CARE EDUCATION/TRAINING PROGRAM

## 2025-01-17 PROCEDURE — 2500000005 HC RX 250 GENERAL PHARMACY W/O HCPCS: Performed by: STUDENT IN AN ORGANIZED HEALTH CARE EDUCATION/TRAINING PROGRAM

## 2025-01-17 PROCEDURE — 36415 COLL VENOUS BLD VENIPUNCTURE: CPT | Performed by: STUDENT IN AN ORGANIZED HEALTH CARE EDUCATION/TRAINING PROGRAM

## 2025-01-17 PROCEDURE — 8010000001 HC DIALYSIS - HEMODIALYSIS PER DAY

## 2025-01-17 PROCEDURE — 2500000004 HC RX 250 GENERAL PHARMACY W/ HCPCS (ALT 636 FOR OP/ED): Performed by: STUDENT IN AN ORGANIZED HEALTH CARE EDUCATION/TRAINING PROGRAM

## 2025-01-17 PROCEDURE — 80069 RENAL FUNCTION PANEL: CPT | Performed by: STUDENT IN AN ORGANIZED HEALTH CARE EDUCATION/TRAINING PROGRAM

## 2025-01-17 PROCEDURE — 82947 ASSAY GLUCOSE BLOOD QUANT: CPT

## 2025-01-17 PROCEDURE — 2500000002 HC RX 250 W HCPCS SELF ADMINISTERED DRUGS (ALT 637 FOR MEDICARE OP, ALT 636 FOR OP/ED): Performed by: STUDENT IN AN ORGANIZED HEALTH CARE EDUCATION/TRAINING PROGRAM

## 2025-01-17 PROCEDURE — 99232 SBSQ HOSP IP/OBS MODERATE 35: CPT | Performed by: STUDENT IN AN ORGANIZED HEALTH CARE EDUCATION/TRAINING PROGRAM

## 2025-01-17 PROCEDURE — 1200000002 HC GENERAL ROOM WITH TELEMETRY DAILY

## 2025-01-17 PROCEDURE — 99233 SBSQ HOSP IP/OBS HIGH 50: CPT | Performed by: INTERNAL MEDICINE

## 2025-01-17 PROCEDURE — 85027 COMPLETE CBC AUTOMATED: CPT | Performed by: STUDENT IN AN ORGANIZED HEALTH CARE EDUCATION/TRAINING PROGRAM

## 2025-01-17 RX ORDER — POLYETHYLENE GLYCOL 3350 17 G/17G
17 POWDER, FOR SOLUTION ORAL 2 TIMES DAILY
Status: DISCONTINUED | OUTPATIENT
Start: 2025-01-17 | End: 2025-01-20

## 2025-01-17 RX ORDER — AMOXICILLIN 250 MG
1 CAPSULE ORAL 2 TIMES DAILY
Status: DISCONTINUED | OUTPATIENT
Start: 2025-01-17 | End: 2025-01-27 | Stop reason: HOSPADM

## 2025-01-17 RX ORDER — CLONIDINE HYDROCHLORIDE 0.1 MG/1
0.2 TABLET ORAL EVERY 8 HOURS SCHEDULED
Status: DISCONTINUED | OUTPATIENT
Start: 2025-01-18 | End: 2025-01-18

## 2025-01-17 RX ADMIN — HYDRALAZINE HYDROCHLORIDE 100 MG: 50 TABLET ORAL at 09:22

## 2025-01-17 RX ADMIN — PETROLATUM: 420 OINTMENT TOPICAL at 20:56

## 2025-01-17 RX ADMIN — CEPHALEXIN 500 MG: 500 CAPSULE ORAL at 20:40

## 2025-01-17 RX ADMIN — ACETAMINOPHEN 975 MG: 325 TABLET, FILM COATED ORAL at 17:25

## 2025-01-17 RX ADMIN — METOCLOPRAMIDE 5 MG: 10 TABLET ORAL at 14:44

## 2025-01-17 RX ADMIN — POLYETHYLENE GLYCOL 3350 17 G: 17 POWDER, FOR SOLUTION ORAL at 20:40

## 2025-01-17 RX ADMIN — VALSARTAN 320 MG: 160 TABLET, FILM COATED ORAL at 20:40

## 2025-01-17 RX ADMIN — METOCLOPRAMIDE 5 MG: 10 TABLET ORAL at 09:23

## 2025-01-17 RX ADMIN — METOCLOPRAMIDE 5 MG: 10 TABLET ORAL at 20:41

## 2025-01-17 RX ADMIN — SENNOSIDES AND DOCUSATE SODIUM 1 TABLET: 50; 8.6 TABLET ORAL at 20:40

## 2025-01-17 RX ADMIN — SEVELAMER CARBONATE 800 MG: 800 TABLET, FILM COATED ORAL at 17:25

## 2025-01-17 RX ADMIN — VALSARTAN 320 MG: 160 TABLET, FILM COATED ORAL at 01:35

## 2025-01-17 RX ADMIN — APIXABAN 5 MG: 5 TABLET, FILM COATED ORAL at 20:40

## 2025-01-17 RX ADMIN — CARVEDILOL 25 MG: 12.5 TABLET, FILM COATED ORAL at 09:23

## 2025-01-17 RX ADMIN — DICYCLOMINE HYDROCHLORIDE 10 MG: 10 CAPSULE ORAL at 22:18

## 2025-01-17 RX ADMIN — CARVEDILOL 25 MG: 12.5 TABLET, FILM COATED ORAL at 20:40

## 2025-01-17 RX ADMIN — CLONIDINE HYDROCHLORIDE 0.3 MG: 0.1 TABLET ORAL at 09:23

## 2025-01-17 RX ADMIN — SEVELAMER CARBONATE 800 MG: 800 TABLET, FILM COATED ORAL at 11:53

## 2025-01-17 RX ADMIN — PANTOPRAZOLE SODIUM 40 MG: 40 TABLET, DELAYED RELEASE ORAL at 09:22

## 2025-01-17 RX ADMIN — METOCLOPRAMIDE 5 MG: 10 TABLET ORAL at 05:47

## 2025-01-17 RX ADMIN — CEPHALEXIN 500 MG: 500 CAPSULE ORAL at 09:24

## 2025-01-17 RX ADMIN — APIXABAN 5 MG: 5 TABLET, FILM COATED ORAL at 09:22

## 2025-01-17 RX ADMIN — Medication: at 20:56

## 2025-01-17 RX ADMIN — ATORVASTATIN CALCIUM 80 MG: 80 TABLET, FILM COATED ORAL at 20:40

## 2025-01-17 RX ADMIN — GABAPENTIN 300 MG: 300 CAPSULE ORAL at 20:40

## 2025-01-17 RX ADMIN — FLUTICASONE PROPIONATE 2 SPRAY: 50 SPRAY, METERED NASAL at 09:27

## 2025-01-17 RX ADMIN — DICYCLOMINE HYDROCHLORIDE 10 MG: 10 CAPSULE ORAL at 09:34

## 2025-01-17 RX ADMIN — TRAZODONE HYDROCHLORIDE 50 MG: 50 TABLET ORAL at 20:51

## 2025-01-17 RX ADMIN — ACETAMINOPHEN 975 MG: 325 TABLET, FILM COATED ORAL at 05:37

## 2025-01-17 RX ADMIN — RENO CAPS 1 CAPSULE: 100; 1.5; 1.7; 20; 10; 1; 150; 5; 6 CAPSULE ORAL at 09:22

## 2025-01-17 RX ADMIN — CARVEDILOL 25 MG: 12.5 TABLET, FILM COATED ORAL at 01:34

## 2025-01-17 RX ADMIN — SEVELAMER CARBONATE 800 MG: 800 TABLET, FILM COATED ORAL at 09:23

## 2025-01-17 RX ADMIN — INSULIN LISPRO 6 UNITS: 100 INJECTION, SOLUTION INTRAVENOUS; SUBCUTANEOUS at 11:52

## 2025-01-17 RX ADMIN — DILTIAZEM HYDROCHLORIDE 360 MG: 180 CAPSULE, COATED, EXTENDED RELEASE ORAL at 09:24

## 2025-01-17 RX ADMIN — ASPIRIN 81 MG: 81 TABLET, COATED ORAL at 09:22

## 2025-01-17 RX ADMIN — LIDOCAINE 4% 1 PATCH: 40 PATCH TOPICAL at 20:51

## 2025-01-17 RX ADMIN — HYDRALAZINE HYDROCHLORIDE 100 MG: 50 TABLET ORAL at 00:14

## 2025-01-17 RX ADMIN — PETROLATUM: 420 OINTMENT TOPICAL at 09:27

## 2025-01-17 RX ADMIN — Medication: at 09:27

## 2025-01-17 ASSESSMENT — PAIN SCALES - GENERAL
PAINLEVEL_OUTOF10: 0 - NO PAIN
PAINLEVEL_OUTOF10: 6
PAINLEVEL_OUTOF10: 0 - NO PAIN
PAINLEVEL_OUTOF10: 0 - NO PAIN

## 2025-01-17 ASSESSMENT — COGNITIVE AND FUNCTIONAL STATUS - GENERAL
EATING MEALS: A LITTLE
STANDING UP FROM CHAIR USING ARMS: A LITTLE
MOVING TO AND FROM BED TO CHAIR: A LITTLE
DRESSING REGULAR LOWER BODY CLOTHING: A LITTLE
CLIMB 3 TO 5 STEPS WITH RAILING: A LOT
MOBILITY SCORE: 24
TOILETING: A LITTLE
MOVING FROM LYING ON BACK TO SITTING ON SIDE OF FLAT BED WITH BEDRAILS: A LITTLE
TURNING FROM BACK TO SIDE WHILE IN FLAT BAD: A LITTLE
PERSONAL GROOMING: A LITTLE
DAILY ACTIVITIY SCORE: 24
MOBILITY SCORE: 16
DRESSING REGULAR UPPER BODY CLOTHING: A LITTLE
HELP NEEDED FOR BATHING: A LITTLE
DAILY ACTIVITIY SCORE: 18
WALKING IN HOSPITAL ROOM: A LOT

## 2025-01-17 ASSESSMENT — PAIN DESCRIPTION - LOCATION: LOCATION: ABDOMEN

## 2025-01-17 ASSESSMENT — PAIN DESCRIPTION - ORIENTATION: ORIENTATION: UPPER

## 2025-01-17 NOTE — NURSING NOTE
Attempted to place PIV via ultrasound x 1 attempt, however unsuccessful. Pt only has left arm to use due to RUE AVF. Pt has no other sustainable veins noted on left arm with or without ultrasound.  Informed bedside RN that pt will need alternative access.

## 2025-01-17 NOTE — NURSING NOTE
Report to Receiving RN:    Report To: PONCHO Solo  Time Report Called: 1250  Hand-Off Communication: Pt taken off HD tx per Dr, only ran 30 mins. BP 83/43, HR 61, pt stated she was feeling woozy. Pt received BP medications prior to HD.   Complications During Treatment: Yes, decr BP  Ultrafiltration Treatment: No, SEQ  Medications Administered During Dialysis: No  Blood Products Administered During Dialysis: No  Labs Sent During Dialysis: No  Heparin Drip Rate Changes: No  Dialysis Catheter Dressing: N/A, AVF  Last Dressing Change: N/A  Last Updated: 12:53 PM by SAKINA MOELLER

## 2025-01-17 NOTE — CARE PLAN
Problem: Discharge Planning  Goal: Discharge to home or other facility with appropriate resources  Outcome: Progressing     Problem: Pain  Goal: Takes deep breaths with improved pain control throughout the shift  Outcome: Progressing  Goal: Turns in bed with improved pain control throughout the shift  Outcome: Progressing  Goal: Walks with improved pain control throughout the shift  Outcome: Progressing  Goal: Performs ADL's with improved pain control throughout shift  Outcome: Progressing  Goal: Participates in PT with improved pain control throughout the shift  Outcome: Progressing  Goal: Free from opioid side effects throughout the shift  Outcome: Progressing  Goal: Free from acute confusion related to pain meds throughout the shift  Outcome: Progressing     Problem: Nutrition  Goal: Less than 5 days NPO/clear liquids  Outcome: Progressing  Goal: Oral intake greater than 50%  Outcome: Progressing  Goal: Oral intake greater 75%  Outcome: Progressing  Goal: Consume prescribed supplement  Outcome: Progressing  Goal: Adequate PO fluid intake  Outcome: Progressing  Goal: Nutrition support goals are met within 48 hrs  Outcome: Progressing  Goal: Nutrition support is meeting 75% of nutrient needs  Outcome: Progressing  Goal: Tube feed tolerance  Outcome: Progressing  Goal: BG  mg/dL  Outcome: Progressing  Goal: Lab values WNL  Outcome: Progressing  Goal: Electrolytes WNL  Outcome: Progressing  Goal: Promote healing  Outcome: Progressing  Goal: Maintain stable weight  Outcome: Progressing  Goal: Reduce weight from edema/fluid  Outcome: Progressing  Goal: Gradual weight gain  Outcome: Progressing  Goal: Improve ostomy output  Outcome: Progressing

## 2025-01-17 NOTE — NURSING NOTE
Report from Sending RN:    Report From: Germania  Recent Surgery of Procedure: No  Baseline Level of Consciousness (LOC): A and O x 4  Oxygen Use: No  Type: ra  Diabetic: Yes  Last BP Med Given Day of Dialysis: AM meds given  Last Pain Med Given: None  Lab Tests to be Obtained with Dialysis: No  Blood Transfusion to be Given During Dialysis: No  Available IV Access: No  Medications to be Administered During Dialysis: No  Continuous IV Infusion Running: No  Restraints on Currently or in the Last 24 Hours: No  Hand-Off Communication: Is stable for dialysis  Dialysis Catheter Dressing: NA  Last Dressing Change: NA

## 2025-01-17 NOTE — PROGRESS NOTES
Assessment/Plan   Stacey Heath is a 54 y.o. female PMH significant for ESRD 2/2 on dialysis T/Th/Sat via RUE AVF (last complete session 01/04/2025), HTN, HFpEF, COPD, brachial DVT on Eliquis who presented for [facility] ED for acute onset shortness of breath. Pt who was admitted to the MICU on 12/29/24 for hypertensive emergency c/b flash pulmonary edema. She has recurrent admissions related to her dialysis and breathing. She was treated with cardene gtt and received UF and HD with nephrology. cardene gtt was weaned and patient resumed home anti-HTN regimen. The patient was transferred to medicine floor but had recurrent hypertensive emergency with bp to 270s requiring cardene infusion to resume. Has had severe sessions of HD and UF, planning to continue until closer to dry weight of 50kg. She had adjustments to her home antihypertensives.      Hypertensive Emergency - resolved  HFpEF, acute on chronic, resolved  - weaned off cardene drip  - still with elevated blood pressures, better controlled.   - TTE 01/03/2025 showed EF of 50-55 with left atrium severely dilated,      Mod-severe mitral annular calcification. Severely increased concentric systolic function. Dialyzing until closer to dry weight  - concern for hypotension given small LV size if pt becomes fluid depleted. Currently better controlled. Will start q4h vitals, pt is off tele now. Changed notificaiton order parameter to notify if SBP <120 to monitor for signs of volume depletion.    Abd discomfort  - check kub- pending read, appears to have a large stool burden, no dilated bowel.   - added ppi  -reports last bm yesterday, passing gas  - increase miralax to bid and make senna/doc 1 tab bid. Continue supp     R breast swelling  - with warm, edema and ttp, no erythema or drainage  - starting keflex.   - examination with nursing chaperone.   - monitor for response in next 24-48hrs. No fevers, no wbc elevation  - reports anxiety due to h/o breast cancers in  family. Counselling provided.she reports she is due for screening. Plan to obtain in op setting.     ESRD   - Dialysis T/Th/Sat   - continue HD/UF until closer to dry weight of 50kg per nephrology, currently 57kg after HD.   - may need bp dose reductions as closer to dry weight  -  hold hydralazine and clonidine the morning of Dialysis.    - Concern about dietary and fluid adherance.     Chronic deep vein thrombosis (DVT) of brachial vein of left upper extremity   - Continue with Eliquis     Headache likely due to migraine vs Hypertensive emergency  - improved    COPD   -Continue Albuterol neb q6hr prn     Insomnia   - Melatonin and trazodone prn    Acute on chronic anemia   - in setting of esrd  - no bleeding noted  - monitor    DMII with gastroparesis  - continue reglan and zofran prn  - continue bowel regimen  - no longer on medications for dm in setting of her esrd    Continuing goals of care, consulted palliative care. Nephrology planning goc meeting with behavioral jonah and would like to involve palliative in goc.         Scheduled outpatient appointments in system:   Future Appointments   Date Time Provider Department Center   1/27/2025  4:00 PM Makenna Barraza MD [id number] [facility]   3/25/2025  1:20 PM Nate Hyman MD [id number] Academic     ---------------------------------------------------------------------------------------------------  Subjective   No events. Pt reports continued breast swelling but not worsened. She is anxious due to breast related cancer in the family. She is also anxious regarding the state of her heart and her kidneys and had several questions follow up goc yesterday. Further counselling provided. Pt disucss her diasolic heart failure, trouble maintaining her volume, possiblity of PD, concerns about housing condition and move.      ---------------------------------------------------------------------------------------------------  Objective   Last Recorded Vitals  Blood  "pressure 167/74, pulse 73, temperature 37.5 °C (99.5 °F), temperature source Skin, resp. rate 18, height 1.397 m (4' 7\"), weight 56 kg (123 lb 6.4 oz), SpO2 99%.  Intake/Output last 3 Shifts:  I/O last 3 completed shifts:  In: 1790 (32 mL/kg) [P.O.:390; I.V.:800 (14.3 mL/kg); Other:600]  Out: 2700 (48.2 mL/kg) [Other:2700]  Weight: 56 kg     Physical Exam  Vitals and nursing note reviewed.   Constitutional:       General: She is not in acute distress.     Appearance: Normal appearance. She is obese.   HENT:      Head: Normocephalic and atraumatic.      Mouth/Throat:      Mouth: Mucous membranes are moist.   Eyes:      General: No scleral icterus.     Extraocular Movements: Extraocular movements intact.      Conjunctiva/sclera: Conjunctivae normal.   Cardiovascular:      Rate and Rhythm: Normal rate and regular rhythm.      Heart sounds: S1 normal and S2 normal. No murmur heard.  Pulmonary:      Effort: Pulmonary effort is normal. No respiratory distress.      Breath sounds: No wheezing, rhonchi or rales.      Comments: Diminished b/l  Abdominal:      General: Bowel sounds are normal. There is no distension.      Palpations: Abdomen is soft.      Tenderness: There is no abdominal tenderness. There is no guarding or rebound.   Musculoskeletal:         General: No swelling or deformity.      Cervical back: Neck supple.      Comments: R breast swelling, ttp, warmth noted.  no erythema or drainage   Skin:     General: Skin is warm and dry.      Findings: No rash.   Neurological:      General: No focal deficit present.      Mental Status: She is alert and oriented to person, place, and time. Mental status is at baseline.      Sensory: No sensory deficit.      Motor: No weakness.   Psychiatric:         Mood and Affect: Mood normal.         Behavior: Behavior normal.         Relevant Results  Lab Results   Component Value Date    WBC 6.9 01/17/2025    HGB 10.8 (L) 01/17/2025    HCT 36.2 01/17/2025    MCV 97 01/17/2025    "  01/17/2025      Lab Results   Component Value Date    GLUCOSE 152 (H) 01/16/2025    CALCIUM 10.0 01/16/2025     01/16/2025    K 3.9 01/16/2025    CO2 33 (H) 01/16/2025    CL 96 (L) 01/16/2025    BUN 16 01/16/2025    CREATININE 3.74 (H) 01/16/2025     Scheduled medications  acetaminophen, 975 mg, oral, q8h  ammonium lactate, , Topical, BID  apixaban, 5 mg, oral, BID  aspirin, 81 mg, oral, Daily  atorvastatin, 80 mg, oral, Nightly  bisacodyl, 10 mg, rectal, Daily  carvedilol, 25 mg, oral, BID  cephalexin, 500 mg, oral, q12h BELEN  cloNIDine, 0.3 mg, oral, q8h BELEN  dilTIAZem CD, 360 mg, oral, Daily  epoetin joceline or biosimilar, 10,000 Units, intravenous, Once per day on Tuesday Thursday Saturday  fluticasone, 2 spray, Each Nostril, Daily  fluticasone furoate-vilanteroL, 1 puff, inhalation, Daily  gabapentin, 300 mg, oral, Nightly  [Held by provider] hydrALAZINE, 100 mg, oral, TID  insulin lispro, 0-10 Units, subcutaneous, TID AC  lidocaine, 1 patch, transdermal, Daily  melatonin, 6 mg, oral, Daily  metoclopramide, 5 mg, oral, q6h  oxygen, , inhalation, Continuous - Inhalation  pantoprazole, 40 mg, oral, Daily before breakfast  perflutren lipid microspheres, 0.5-10 mL of dilution, intravenous, Once in imaging  polyethylene glycol, 17 g, oral, Daily  sennosides-docusate sodium, 1 tablet, oral, Nightly  sevelamer carbonate, 800 mg, oral, TID  torsemide, 20 mg, oral, 2 times per day on Sunday Tuesday Thursday Saturday  valsartan, 320 mg, oral, q PM  vitamin B complex-vitamin C-folic acid, 1 capsule, oral, Daily  white petrolatum, , Topical, BID      Continuous medications     PRN medications  PRN medications: albuterol, diclofenac sodium, dicyclomine, diphenhydrAMINE, ipratropium-albuteroL, lidocaine, lidocaine-diphenhydraMINE-Maalox 1:1:1, ondansetron, pramoxine, sodium chloride, SUMAtriptan, traZODone    Al Painter MD

## 2025-01-17 NOTE — PROGRESS NOTES
" Personalized Care stuff unavailable to assist pt with virtual visit with this RD. This RD spoke to pt via phone. Pt was sleeping at the time but agreed to talk with this RD briefly. Pt feeling \"up and down\". States she hasn't been eating much if any of her meals. Has been getting chocolate Ensure but doesn't appear to be drinking it \"they're all on my windowsill\". States dialysis has been going \"ok\" is meeting with nephrologist \"every day\". This RD will coordinate with  PC staff and reschedule fuv with pt. May wait for pt to be d/c'd pending needs.   "

## 2025-01-17 NOTE — PROGRESS NOTES
Nephrology Follow-up Note   Patient ID: Stacey Heath is a 54 y.o. female.   Admitted for :   Chief Complaint   Patient presents with    Shortness of Breath      Evaluation of patient on dialysis at [facility]  [address]  [facility] [address] on 12/29/2024  Seen and examined during isolated UF. Labs and events reviewed.      Subjective:   Complaints of cramping and feeling sick after started isolated UF  Denies SOB or leg swelling       Patient Active Problem List   Diagnosis    Anxiety    Astigmatism    Carotid bruit    Diabetes mellitus type 2, uncomplicated (Multi)    Coronary artery disease involving native coronary artery    Iron deficiency anemia    Migraine    Neuropathy    Transplant    Polyneuropathy due to type 2 diabetes mellitus (Multi)    Nuclear senile cataract of both eyes    Normocytic normochromic anemia    Low back pain    Hidradenitis    Complex dyslipidemia    Chronic heart failure with preserved ejection fraction (HFpEF)    Malnutrition of moderate degree (Multi)    Kidney transplant candidate    Nonrheumatic mitral valve regurgitation    Chronic deep vein thrombosis (DVT) of brachial vein of left upper extremity (Multi)    Acute diastolic CHF (congestive heart failure)    Hypertensive emergency    Resistant hypertension    Congestive heart failure    ESRD (end stage renal disease) on dialysis (Multi)    Flash pulmonary edema    Sleep-related breathing disorder    Congestive heart failure, unspecified HF chronicity, unspecified heart failure type    COPD exacerbation (Multi)       Scheduled medications:  acetaminophen, 975 mg, oral, q8h  ammonium lactate, , Topical, BID  apixaban, 5 mg, oral, BID  aspirin, 81 mg, oral, Daily  atorvastatin, 80 mg, oral, Nightly  bisacodyl, 10 mg, rectal, Daily  carvedilol, 25 mg, oral, BID  cephalexin, 500 mg, oral, q12h BELEN  cloNIDine, 0.3 mg, oral, q8h BELEN  dilTIAZem CD, 360 mg, oral, Daily  epoetin joceline or biosimilar,  10,000 Units, intravenous, Once per day on Tuesday Thursday Saturday  fluticasone, 2 spray, Each Nostril, Daily  fluticasone furoate-vilanteroL, 1 puff, inhalation, Daily  gabapentin, 300 mg, oral, Nightly  [Held by provider] hydrALAZINE, 100 mg, oral, TID  insulin lispro, 0-10 Units, subcutaneous, TID AC  lidocaine, 1 patch, transdermal, Daily  melatonin, 6 mg, oral, Daily  metoclopramide, 5 mg, oral, q6h  oxygen, , inhalation, Continuous - Inhalation  pantoprazole, 40 mg, oral, Daily before breakfast  perflutren lipid microspheres, 0.5-10 mL of dilution, intravenous, Once in imaging  polyethylene glycol, 17 g, oral, Daily  sennosides-docusate sodium, 1 tablet, oral, Nightly  sevelamer carbonate, 800 mg, oral, TID  torsemide, 20 mg, oral, 2 times per day on Sunday Tuesday Thursday Saturday  valsartan, 320 mg, oral, q PM  vitamin B complex-vitamin C-folic acid, 1 capsule, oral, Daily  white petrolatum, , Topical, BID         PRN medications: albuterol, diclofenac sodium, dicyclomine, diphenhydrAMINE, ipratropium-albuteroL, lidocaine, lidocaine-diphenhydraMINE-Maalox 1:1:1, ondansetron, pramoxine, sodium chloride, SUMAtriptan, traZODone     Heart Rate:  [73-82]   Temp:  [35.9 °C (96.6 °F)-37.5 °C (99.5 °F)]   Resp:  [18]   BP: (113-179)/(71-94)   Weight:  [56 kg (123 lb 6.4 oz)]   SpO2:  [94 %-100 %]    Weight: 55.3 kg (122 lb)   Gen: alert, in distress due to cramping   HEENT: NC/AT  Neck: supple  Pulm: clear b/l   CVS: RRR, no rub  Abd: S/NT/ND  LE: no edema , no cyanosis   Dialysis acces:  RUEAVF     Lab Results   Component Value Date    WBC 5.0 01/16/2025    HGB 10.9 (L) 01/16/2025    HCT 36.5 01/16/2025    MCV 97 01/16/2025     01/16/2025     Lab Results   Component Value Date    GLUCOSE 152 (H) 01/16/2025    CALCIUM 10.0 01/16/2025     01/16/2025    K 3.9 01/16/2025    CO2 33 (H) 01/16/2025    CL 96 (L) 01/16/2025    BUN 16 01/16/2025    CREATININE 3.74 (H) 01/16/2025     Results from last 72 hours    Lab Units 01/16/25  1653   ALBUMIN g/dL 3.9   GLUCOSE mg/dL 152*   HEMOGLOBIN g/dL 10.9*   WBC AUTO x10*3/uL 5.0      Results from last 72 hours   Lab Units 01/16/25  1653   SODIUM mmol/L 142   POTASSIUM mmol/L 3.9   CO2 mmol/L 33*   BUN mg/dL 16   CREATININE mg/dL 3.74*   PHOSPHORUS mg/dL 3.8   CALCIUM mg/dL 10.0        Assessment   ESRD-HD admitted with SOB  HFpEF; severe calcified MV    Approaching euvolemia on exam ; no labs this AM   Pre HD weight 56.4 Kg // outpatinet estimated DW: 53 Kg - per pt she may have gained some weight over the holidays  Plan   Hold IUF today ; Plan HD tomorrow 1/18 with UF as tolerated; AM BP meds to be held prior to HD to allow UF   MBD - Phosphorus binder: continue with Sevelamer with meals  Anemia of CKD: EPO to be given x 3 per week   Access: no issues   BP: very low during treatment --> hold hydralazine   Renal Diet   Daily renal MVI   Continue 3 x per week hemodialysis; TTS   Haydee Mosher MD MPH

## 2025-01-18 ENCOUNTER — APPOINTMENT (OUTPATIENT)
Dept: DIALYSIS | Facility: HOSPITAL | Age: 55
End: 2025-01-18
Payer: COMMERCIAL

## 2025-01-18 LAB
ALBUMIN SERPL BCP-MCNC: 3.5 G/DL (ref 3.4–5)
ANION GAP SERPL CALC-SCNC: 18 MMOL/L (ref 10–20)
BUN SERPL-MCNC: 55 MG/DL (ref 6–23)
CALCIUM SERPL-MCNC: 9.1 MG/DL (ref 8.6–10.6)
CHLORIDE SERPL-SCNC: 97 MMOL/L (ref 98–107)
CO2 SERPL-SCNC: 28 MMOL/L (ref 21–32)
CREAT SERPL-MCNC: 6.22 MG/DL (ref 0.5–1.05)
EGFRCR SERPLBLD CKD-EPI 2021: 8 ML/MIN/1.73M*2
ERYTHROCYTE [DISTWIDTH] IN BLOOD BY AUTOMATED COUNT: 14.8 % (ref 11.5–14.5)
GLUCOSE BLD MANUAL STRIP-MCNC: 142 MG/DL (ref 74–99)
GLUCOSE BLD MANUAL STRIP-MCNC: 72 MG/DL (ref 74–99)
GLUCOSE SERPL-MCNC: 101 MG/DL (ref 74–99)
HCT VFR BLD AUTO: 30 % (ref 36–46)
HGB BLD-MCNC: 9 G/DL (ref 12–16)
MCH RBC QN AUTO: 28.8 PG (ref 26–34)
MCHC RBC AUTO-ENTMCNC: 30 G/DL (ref 32–36)
MCV RBC AUTO: 96 FL (ref 80–100)
NRBC BLD-RTO: 0 /100 WBCS (ref 0–0)
PHOSPHATE SERPL-MCNC: 5.4 MG/DL (ref 2.5–4.9)
PLATELET # BLD AUTO: 233 X10*3/UL (ref 150–450)
POTASSIUM SERPL-SCNC: 4.4 MMOL/L (ref 3.5–5.3)
RBC # BLD AUTO: 3.12 X10*6/UL (ref 4–5.2)
SODIUM SERPL-SCNC: 139 MMOL/L (ref 136–145)
WBC # BLD AUTO: 5.7 X10*3/UL (ref 4.4–11.3)

## 2025-01-18 PROCEDURE — 1200000002 HC GENERAL ROOM WITH TELEMETRY DAILY

## 2025-01-18 PROCEDURE — 2500000004 HC RX 250 GENERAL PHARMACY W/ HCPCS (ALT 636 FOR OP/ED): Performed by: STUDENT IN AN ORGANIZED HEALTH CARE EDUCATION/TRAINING PROGRAM

## 2025-01-18 PROCEDURE — 2500000001 HC RX 250 WO HCPCS SELF ADMINISTERED DRUGS (ALT 637 FOR MEDICARE OP): Performed by: STUDENT IN AN ORGANIZED HEALTH CARE EDUCATION/TRAINING PROGRAM

## 2025-01-18 PROCEDURE — 85027 COMPLETE CBC AUTOMATED: CPT | Performed by: STUDENT IN AN ORGANIZED HEALTH CARE EDUCATION/TRAINING PROGRAM

## 2025-01-18 PROCEDURE — 90935 HEMODIALYSIS ONE EVALUATION: CPT | Performed by: INTERNAL MEDICINE

## 2025-01-18 PROCEDURE — 83690 ASSAY OF LIPASE: CPT | Performed by: STUDENT IN AN ORGANIZED HEALTH CARE EDUCATION/TRAINING PROGRAM

## 2025-01-18 PROCEDURE — 82947 ASSAY GLUCOSE BLOOD QUANT: CPT

## 2025-01-18 PROCEDURE — 36415 COLL VENOUS BLD VENIPUNCTURE: CPT | Performed by: STUDENT IN AN ORGANIZED HEALTH CARE EDUCATION/TRAINING PROGRAM

## 2025-01-18 PROCEDURE — 8010000001 HC DIALYSIS - HEMODIALYSIS PER DAY

## 2025-01-18 PROCEDURE — 99232 SBSQ HOSP IP/OBS MODERATE 35: CPT | Performed by: STUDENT IN AN ORGANIZED HEALTH CARE EDUCATION/TRAINING PROGRAM

## 2025-01-18 PROCEDURE — 2500000005 HC RX 250 GENERAL PHARMACY W/O HCPCS: Performed by: STUDENT IN AN ORGANIZED HEALTH CARE EDUCATION/TRAINING PROGRAM

## 2025-01-18 PROCEDURE — 80069 RENAL FUNCTION PANEL: CPT | Performed by: STUDENT IN AN ORGANIZED HEALTH CARE EDUCATION/TRAINING PROGRAM

## 2025-01-18 RX ORDER — METOCLOPRAMIDE 10 MG/1
10 TABLET ORAL EVERY 8 HOURS
Status: DISCONTINUED | OUTPATIENT
Start: 2025-01-18 | End: 2025-01-27 | Stop reason: HOSPADM

## 2025-01-18 RX ORDER — CLONIDINE HYDROCHLORIDE 0.1 MG/1
0.1 TABLET ORAL EVERY 8 HOURS SCHEDULED
Status: DISCONTINUED | OUTPATIENT
Start: 2025-01-18 | End: 2025-01-19

## 2025-01-18 RX ORDER — ONDANSETRON 4 MG/1
4 TABLET, ORALLY DISINTEGRATING ORAL ONCE
Status: COMPLETED | OUTPATIENT
Start: 2025-01-18 | End: 2025-01-18

## 2025-01-18 RX ADMIN — TORSEMIDE 20 MG: 20 TABLET ORAL at 23:55

## 2025-01-18 RX ADMIN — CLONIDINE HYDROCHLORIDE 0.1 MG: 0.1 TABLET ORAL at 16:39

## 2025-01-18 RX ADMIN — VALSARTAN 320 MG: 160 TABLET, FILM COATED ORAL at 23:55

## 2025-01-18 RX ADMIN — METOCLOPRAMIDE 5 MG: 10 TABLET ORAL at 03:05

## 2025-01-18 RX ADMIN — SEVELAMER CARBONATE 800 MG: 800 TABLET, FILM COATED ORAL at 16:39

## 2025-01-18 RX ADMIN — SENNOSIDES AND DOCUSATE SODIUM 1 TABLET: 50; 8.6 TABLET ORAL at 11:05

## 2025-01-18 RX ADMIN — Medication: at 22:48

## 2025-01-18 RX ADMIN — Medication: at 11:01

## 2025-01-18 RX ADMIN — CARVEDILOL 25 MG: 12.5 TABLET, FILM COATED ORAL at 11:05

## 2025-01-18 RX ADMIN — TRAZODONE HYDROCHLORIDE 50 MG: 50 TABLET ORAL at 23:55

## 2025-01-18 RX ADMIN — ATORVASTATIN CALCIUM 80 MG: 80 TABLET, FILM COATED ORAL at 23:55

## 2025-01-18 RX ADMIN — PETROLATUM: 420 OINTMENT TOPICAL at 22:51

## 2025-01-18 RX ADMIN — DICYCLOMINE HYDROCHLORIDE 10 MG: 10 CAPSULE ORAL at 19:23

## 2025-01-18 RX ADMIN — DILTIAZEM HYDROCHLORIDE 360 MG: 180 CAPSULE, COATED, EXTENDED RELEASE ORAL at 11:04

## 2025-01-18 RX ADMIN — MELATONIN 6 MG: 3 TAB ORAL at 23:55

## 2025-01-18 RX ADMIN — CEPHALEXIN 500 MG: 500 CAPSULE ORAL at 23:54

## 2025-01-18 RX ADMIN — SENNOSIDES AND DOCUSATE SODIUM 1 TABLET: 50; 8.6 TABLET ORAL at 20:03

## 2025-01-18 RX ADMIN — APIXABAN 5 MG: 5 TABLET, FILM COATED ORAL at 11:05

## 2025-01-18 RX ADMIN — ASPIRIN 81 MG: 81 TABLET, COATED ORAL at 11:05

## 2025-01-18 RX ADMIN — APIXABAN 5 MG: 5 TABLET, FILM COATED ORAL at 23:54

## 2025-01-18 RX ADMIN — ACETAMINOPHEN 975 MG: 325 TABLET, FILM COATED ORAL at 11:05

## 2025-01-18 RX ADMIN — METOCLOPRAMIDE 5 MG: 10 TABLET ORAL at 11:05

## 2025-01-18 RX ADMIN — ACETAMINOPHEN 975 MG: 325 TABLET, FILM COATED ORAL at 19:23

## 2025-01-18 RX ADMIN — CLONIDINE HYDROCHLORIDE 0.2 MG: 0.1 TABLET ORAL at 00:44

## 2025-01-18 RX ADMIN — PETROLATUM: 420 OINTMENT TOPICAL at 11:02

## 2025-01-18 RX ADMIN — ACETAMINOPHEN 975 MG: 325 TABLET, FILM COATED ORAL at 03:05

## 2025-01-18 RX ADMIN — ONDANSETRON 4 MG: 4 TABLET, ORALLY DISINTEGRATING ORAL at 22:38

## 2025-01-18 RX ADMIN — POLYETHYLENE GLYCOL 3350 17 G: 17 POWDER, FOR SOLUTION ORAL at 20:03

## 2025-01-18 RX ADMIN — DICYCLOMINE HYDROCHLORIDE 10 MG: 10 CAPSULE ORAL at 11:04

## 2025-01-18 RX ADMIN — PANTOPRAZOLE SODIUM 40 MG: 40 TABLET, DELAYED RELEASE ORAL at 11:05

## 2025-01-18 RX ADMIN — RENO CAPS 1 CAPSULE: 100; 1.5; 1.7; 20; 10; 1; 150; 5; 6 CAPSULE ORAL at 11:05

## 2025-01-18 RX ADMIN — GABAPENTIN 300 MG: 300 CAPSULE ORAL at 23:55

## 2025-01-18 RX ADMIN — POLYETHYLENE GLYCOL 3350 17 G: 17 POWDER, FOR SOLUTION ORAL at 11:04

## 2025-01-18 RX ADMIN — TORSEMIDE 20 MG: 20 TABLET ORAL at 11:06

## 2025-01-18 RX ADMIN — METOCLOPRAMIDE 10 MG: 10 TABLET ORAL at 16:39

## 2025-01-18 RX ADMIN — SEVELAMER CARBONATE 800 MG: 800 TABLET, FILM COATED ORAL at 11:05

## 2025-01-18 RX ADMIN — CEPHALEXIN 500 MG: 500 CAPSULE ORAL at 11:04

## 2025-01-18 RX ADMIN — BISACODYL 10 MG: 10 SUPPOSITORY RECTAL at 19:59

## 2025-01-18 ASSESSMENT — COGNITIVE AND FUNCTIONAL STATUS - GENERAL
DAILY ACTIVITIY SCORE: 24
MOBILITY SCORE: 24

## 2025-01-18 ASSESSMENT — PAIN - FUNCTIONAL ASSESSMENT
PAIN_FUNCTIONAL_ASSESSMENT: NO/DENIES PAIN
PAIN_FUNCTIONAL_ASSESSMENT: 0-10

## 2025-01-18 ASSESSMENT — PAIN DESCRIPTION - DESCRIPTORS: DESCRIPTORS: THROBBING

## 2025-01-18 ASSESSMENT — PAIN SCALES - GENERAL
PAINLEVEL_OUTOF10: 6
PAINLEVEL_OUTOF10: 4
PAINLEVEL_OUTOF10: 0 - NO PAIN

## 2025-01-18 ASSESSMENT — PAIN DESCRIPTION - LOCATION: LOCATION: ABDOMEN

## 2025-01-18 NOTE — PROCEDURES
"DIALYSIS NOTE:    Seen and examined during hemodialysis, undergoing treatment per submitted orders: 2 K, 2.5 Ca, 3  hours. Fluid removal 2 as tolerated (keep SBP> 100mmHg).     /68 (BP Location: Left arm, Patient Position: Lying)   Pulse 74   Temp 36.1 °C (97 °F) (Temporal)   Resp 18   Ht 1.397 m (4' 7\")   Wt 57.6 kg (126 lb 14.4 oz)   SpO2 97%   BMI 29.49 kg/m²     Additional Recommendations:    Scheduled medications  acetaminophen, 975 mg, oral, q8h  ammonium lactate, , Topical, BID  apixaban, 5 mg, oral, BID  aspirin, 81 mg, oral, Daily  atorvastatin, 80 mg, oral, Nightly  bisacodyl, 10 mg, rectal, Daily  carvedilol, 25 mg, oral, BID  cephalexin, 500 mg, oral, q12h BELEN  cloNIDine, 0.1 mg, oral, q8h BELEN  dilTIAZem CD, 360 mg, oral, Daily  epoetin joceline or biosimilar, 10,000 Units, intravenous, Once per day on Tuesday Thursday Saturday  fluticasone, 2 spray, Each Nostril, Daily  fluticasone furoate-vilanteroL, 1 puff, inhalation, Daily  gabapentin, 300 mg, oral, Nightly  [Held by provider] hydrALAZINE, 100 mg, oral, TID  insulin lispro, 0-10 Units, subcutaneous, TID AC  lidocaine, 1 patch, transdermal, Daily  melatonin, 6 mg, oral, Daily  metoclopramide, 5 mg, oral, q6h  oxygen, , inhalation, Continuous - Inhalation  pantoprazole, 40 mg, oral, Daily before breakfast  perflutren lipid microspheres, 0.5-10 mL of dilution, intravenous, Once in imaging  polyethylene glycol, 17 g, oral, BID  sennosides-docusate sodium, 1 tablet, oral, BID  sevelamer carbonate, 800 mg, oral, TID  torsemide, 20 mg, oral, 2 times per day on Sunday Tuesday Thursday Saturday  valsartan, 320 mg, oral, q PM  vitamin B complex-vitamin C-folic acid, 1 capsule, oral, Daily  white petrolatum, , Topical, BID      Continuous medications     PRN medications  PRN medications: albuterol, diclofenac sodium, dicyclomine, diphenhydrAMINE, ipratropium-albuteroL, lidocaine, lidocaine-diphenhydraMINE-Maalox 1:1:1, ondansetron, pramoxine, sodium " chloride, SUMAtriptan, traZODone      Recent Results (from the past 12 hours)   CBC    Collection Time: 01/18/25  7:04 AM   Result Value Ref Range    WBC 5.7 4.4 - 11.3 x10*3/uL    nRBC 0.0 0.0 - 0.0 /100 WBCs    RBC 3.12 (L) 4.00 - 5.20 x10*6/uL    Hemoglobin 9.0 (L) 12.0 - 16.0 g/dL    Hematocrit 30.0 (L) 36.0 - 46.0 %    MCV 96 80 - 100 fL    MCH 28.8 26.0 - 34.0 pg    MCHC 30.0 (L) 32.0 - 36.0 g/dL    RDW 14.8 (H) 11.5 - 14.5 %    Platelets 233 150 - 450 x10*3/uL

## 2025-01-18 NOTE — CARE PLAN
The clinical goals for the shift include pt will have a BM      Problem: Discharge Planning  Goal: Discharge to home or other facility with appropriate resources  Outcome: Progressing

## 2025-01-18 NOTE — NURSING NOTE
Report from Sending RN:    Report From: Todd  Recent Surgery of Procedure: No  Baseline Level of Consciousness (LOC): a and ox 4 but sleepy.  Oxygen Use: No  Type: ra  Diabetic: Yes  Last BP Med Given Day of Dialysis: No meds today yet  Last Pain Med Given: None  Lab Tests to be Obtained with Dialysis: Yes  Blood Transfusion to be Given During Dialysis: No  Available IV Access: No  Medications to be Administered During Dialysis: No  Continuous IV Infusion Running: No  Restraints on Currently or in the Last 24 Hours: No  Hand-Off Communication: Is agreeable to come down to dialysis this AM  Dialysis Catheter Dressing: AVF  Last Dressing Change: AVF

## 2025-01-18 NOTE — PROGRESS NOTES
Assessment/Plan   Stacey Heath is a 54 y.o. female PMH significant for ESRD 2/2 on dialysis T/Th/Sat via [facility] AVF (last complete session 01/04/2025), HTN, HFpEF, COPD, brachial DVT on Eliquis who presented for [facility] ED for acute onset shortness of breath. Pt who was admitted to the MICU on 12/29/24 for hypertensive emergency c/b flash pulmonary edema. She has recurrent admissions related to her dialysis and breathing. She was treated with cardene gtt and received UF and HD with nephrology. cardene gtt was weaned and patient resumed home anti-HTN regimen. The patient was transferred to medicine floor but had recurrent hypertensive emergency with bp to 270s requiring cardene infusion to resume. Has had severe sessions of HD and UF, planning to continue until closer to dry weight of 50kg. She had adjustments to her home antihypertensives.      Hypertensive Emergency - resolved  HFpEF, acute on chronic, resolved  - weaned off cardene drip  - still with elevated blood pressures, better controlled.   - TTE 01/03/2025 showed EF of 50-55 with left atrium severely dilated,      Mod-severe mitral annular calcification. Severely increased concentric systolic function. Dialyzing until closer to dry weight  - concern for hypotension given small LV size if pt becomes fluid depleted. Currently better controlled. Will start q4h vitals, pt is off tele now. Changed notificaiton order parameter to notify if SBP <120 to monitor for signs of volume depletion.  - bp low in hd yesterday, normal on floor.   - weaning bp meds as she is more euvolemic. Decreased clonidine to 0.1 tid. Hold bp meds prior to HD to allow room for fluid removal.    Abd discomfort  Nausea/vomitting  constipation  - check kub- pending read, appears to have a large stool burden, no dilated bowel.   - added ppi  -reports last bm yesterday, passing gas  - increase miralax to bid and make senna/doc 1 tab bid. Pt is incrasing activity. Developed some n/v, on zofran  and reglan. Pt has not tried enema or supp yet, pt ok with trialing. Pt shown her xray and discussed management. Avoiding opioids for now which could worsen consitpation  - check lfts, lipase    R breast swelling  - with warm, edema and ttp, no erythema or drainage  - starting keflex.   - examination with nursing chaperone.   - monitor for response in next 24-48hrs. No fevers, no wbc elevation  - reports anxiety due to h/o breast cancers in family. Counselling provided.she reports she is due for screening. Plan to obtain in op setting.     ESRD   - Dialysis T/Th/Sat   - continue HD/UF until closer to dry weight of 50kg per nephrology, currently 57kg after HD.   - may need bp dose reductions as closer to dry weight  -  hold hydralazine and clonidine the morning of Dialysis.    - Concern about dietary and fluid adherance.     Chronic deep vein thrombosis (DVT) of brachial vein of left upper extremity   - Continue with Eliquis     Headache likely due to migraine vs Hypertensive emergency  - improved    COPD   -Continue Albuterol neb q6hr prn     Insomnia   - Melatonin and trazodone prn    Acute on chronic anemia   - in setting of esrd  - no bleeding noted  - monitor    DMII with gastroparesis  - continue reglan and zofran prn  - continue bowel regimen  - no longer on medications for dm in setting of her esrd    Continuing goals of care, consulted palliative care. Nephrology planning goc meeting with behavioral jonah and would like to involve palliative in goc.         Scheduled outpatient appointments in system:   Future Appointments   Date Time Provider Department Center   1/27/2025  4:00 PM Makenna Barraza MD [id number] [facility]   3/25/2025  1:20 PM Nate Hyman MD [id number] [facility]     ---------------------------------------------------------------------------------------------------  Subjective   No events. No bm since 1/16, intermittant pain now with intermittant n/v. She is increasing her activity, no  "bm on increased bowel regimen since yesterday. Reports improved breast swelling. No f/c. Has been trying to stick to a DASH diet. Tolerating hd so far while weaning bp meds.   ---------------------------------------------------------------------------------------------------  Objective   Last Recorded Vitals  Blood pressure 159/68, pulse 74, temperature 36.1 °C (97 °F), temperature source Temporal, resp. rate 18, height 1.397 m (4' 7\"), weight 57.6 kg (126 lb 14.4 oz), SpO2 97%.  Intake/Output last 3 Shifts:  I/O last 3 completed shifts:  In: 2000 (34.7 mL/kg) [P.O.:600; I.V.:900 (15.6 mL/kg); Other:500]  Out: 347 (6 mL/kg) [Other:347]  Weight: 57.6 kg     Physical Exam  Vitals and nursing note reviewed.   Constitutional:       General: She is not in acute distress.     Appearance: Normal appearance. She is obese.   HENT:      Head: Normocephalic and atraumatic.      Mouth/Throat:      Mouth: Mucous membranes are moist.   Eyes:      General: No scleral icterus.     Extraocular Movements: Extraocular movements intact.      Conjunctiva/sclera: Conjunctivae normal.   Cardiovascular:      Rate and Rhythm: Normal rate and regular rhythm.      Heart sounds: S1 normal and S2 normal. No murmur heard.  Pulmonary:      Effort: Pulmonary effort is normal. No respiratory distress.      Breath sounds: No wheezing, rhonchi or rales.      Comments: Diminished b/l  Abdominal:      General: Bowel sounds are normal. There is no distension.      Palpations: Abdomen is soft.      Tenderness: There is no abdominal tenderness. There is no guarding or rebound.   Musculoskeletal:         General: No swelling or deformity.      Cervical back: Neck supple.      Comments: R breast swelling, ttp, warmth noted.  no erythema or drainage   Skin:     General: Skin is warm and dry.      Findings: No rash.   Neurological:      General: No focal deficit present.      Mental Status: She is alert and oriented to person, place, and time. Mental status " is at baseline.      Sensory: No sensory deficit.      Motor: No weakness.   Psychiatric:         Mood and Affect: Mood normal.         Behavior: Behavior normal.         Relevant Results  Lab Results   Component Value Date    WBC 5.7 01/18/2025    HGB 9.0 (L) 01/18/2025    HCT 30.0 (L) 01/18/2025    MCV 96 01/18/2025     01/18/2025      Lab Results   Component Value Date    GLUCOSE 262 (H) 01/17/2025    CALCIUM 9.7 01/17/2025     01/17/2025    K 4.6 01/17/2025    CO2 27 01/17/2025    CL 93 (L) 01/17/2025    BUN 38 (H) 01/17/2025    CREATININE 5.77 (H) 01/17/2025     Scheduled medications  acetaminophen, 975 mg, oral, q8h  ammonium lactate, , Topical, BID  apixaban, 5 mg, oral, BID  aspirin, 81 mg, oral, Daily  atorvastatin, 80 mg, oral, Nightly  bisacodyl, 10 mg, rectal, Daily  carvedilol, 25 mg, oral, BID  cephalexin, 500 mg, oral, q12h BELEN  cloNIDine, 0.1 mg, oral, q8h BELEN  dilTIAZem CD, 360 mg, oral, Daily  epoetin joceline or biosimilar, 10,000 Units, intravenous, Once per day on Tuesday Thursday Saturday  fluticasone, 2 spray, Each Nostril, Daily  fluticasone furoate-vilanteroL, 1 puff, inhalation, Daily  gabapentin, 300 mg, oral, Nightly  [Held by provider] hydrALAZINE, 100 mg, oral, TID  insulin lispro, 0-10 Units, subcutaneous, TID AC  lidocaine, 1 patch, transdermal, Daily  melatonin, 6 mg, oral, Daily  metoclopramide, 5 mg, oral, q6h  oxygen, , inhalation, Continuous - Inhalation  pantoprazole, 40 mg, oral, Daily before breakfast  perflutren lipid microspheres, 0.5-10 mL of dilution, intravenous, Once in imaging  polyethylene glycol, 17 g, oral, BID  sennosides-docusate sodium, 1 tablet, oral, BID  sevelamer carbonate, 800 mg, oral, TID  torsemide, 20 mg, oral, 2 times per day on Sunday Tuesday Thursday Saturday  valsartan, 320 mg, oral, q PM  vitamin B complex-vitamin C-folic acid, 1 capsule, oral, Daily  white petrolatum, , Topical, BID      Continuous medications     PRN medications  PRN  medications: albuterol, diclofenac sodium, dicyclomine, diphenhydrAMINE, ipratropium-albuteroL, lidocaine, lidocaine-diphenhydraMINE-Maalox 1:1:1, ondansetron, pramoxine, sodium chloride, SUMAtriptan, traZODone    Al Painter MD

## 2025-01-18 NOTE — PROGRESS NOTES
Recreation Therapy Note    Therapy Session  Visit Type: Follow-up visit  Session Start Time: 1355  Session End Time: 1515  Intervention Delivery: In-person  Conflict of Service: None  Number of family members present: 0  Family Present for Session: None  Family Participation: None  Number of staff members present: 1    Pre-assessment  Mood/Affect: Appropriate, Calm  Verbalized Emotional State:  (none verbalized)    Treatment  Areas of Focus: Coping, Socialization, Stress reduction, Normalization, Self-expression  Co-Treatment:  (none)  Interruption: No  Patient Fell Asleep at End of Session: No    Post-assessment  Mood/Affect: Calm, Appropriate, Cooperative, Participative  Verbalized Emotional State:  (none)  Continue Visiting: Yes  Total Session Time (min): 80 minutes    Narrative  Assessment Detail: Patient was resting in bed upon arrival.  Agreeable to engaging in a recreational therapy session.  Plan: To encourage the exploration of safe and effective positive leisure coping skills to manage situational stressors.  Intervention: Patient chose to work on a craft project  Evaluation: Patient was pleasant, social and invested in her project. Initiated conversation.  Patient grew tired and session was stopped for her to rest.  Will complete project another day.  Follow-up: Will continue to encourage the exploration of positive leisure coping skills.  Patient Comments: None noted.

## 2025-01-18 NOTE — CONSULTS
Inpatient consult to Palliative Care  Consult performed by: Isaiah Monteiro MD  Consult ordered by: Al Painter MD  Reason for consult: C  Assessment/Recommendations: I have reviewed and discussed Stacey Heath clinical course and history with Dr. Barraza. She would like us to join an interdisciplinary meeting (including nephrology and behavioral health) with the patient and a family member. The schedule is yet to be determined due to family availability. Please keep us posted. We'd be happy to join the meeting to assist in caring for Stacey Heath. Palliative care is available Monday-Friday 9:30 AM to 5:30 PM.    We will place a full consultation after the meeting. Please do not hesitate to reach out or page the team pager for questions.    Isaiah Monteiro MD  Palliative Care Physician  Message: Epic Secure chat  Team pager [phone]

## 2025-01-18 NOTE — NURSING NOTE
Report to Receiving RN:    Report To: Lanette  Time Report Called: 1030  Hand-Off Communication: 1.8 L removed, BP during tx was in the 1 teens. Post tx she is 168/74. No complaints  Complications During Treatment: Yes, a little hypotension  Ultrafiltration Treatment: Yes  Medications Administered During Dialysis: No  Blood Products Administered During Dialysis: No  Labs Sent During Dialysis: Yes  Heparin Drip Rate Changes: N/A  Dialysis Catheter Dressing: na  Last Dressing Change: na    Last Updated: 10:28 AM by ROXY KWOK

## 2025-01-19 ENCOUNTER — APPOINTMENT (OUTPATIENT)
Dept: RADIOLOGY | Facility: HOSPITAL | Age: 55
DRG: 252 | End: 2025-01-19
Payer: COMMERCIAL

## 2025-01-19 VITALS
SYSTOLIC BLOOD PRESSURE: 168 MMHG | HEART RATE: 99 BPM | OXYGEN SATURATION: 97 % | BODY MASS INDEX: 29.37 KG/M2 | WEIGHT: 126.9 LBS | HEIGHT: 55 IN | TEMPERATURE: 97.7 F | DIASTOLIC BLOOD PRESSURE: 102 MMHG | RESPIRATION RATE: 16 BRPM

## 2025-01-19 LAB
ALBUMIN SERPL BCP-MCNC: 4.7 G/DL (ref 3.4–5)
ANION GAP SERPL CALC-SCNC: 23 MMOL/L (ref 10–20)
BUN SERPL-MCNC: 38 MG/DL (ref 6–23)
CALCIUM SERPL-MCNC: 10.7 MG/DL (ref 8.6–10.6)
CHLORIDE SERPL-SCNC: 93 MMOL/L (ref 98–107)
CO2 SERPL-SCNC: 23 MMOL/L (ref 21–32)
CREAT SERPL-MCNC: 6.22 MG/DL (ref 0.5–1.05)
EGFRCR SERPLBLD CKD-EPI 2021: 8 ML/MIN/1.73M*2
ERYTHROCYTE [DISTWIDTH] IN BLOOD BY AUTOMATED COUNT: 14.6 % (ref 11.5–14.5)
GLUCOSE BLD MANUAL STRIP-MCNC: 114 MG/DL (ref 74–99)
GLUCOSE BLD MANUAL STRIP-MCNC: 119 MG/DL (ref 74–99)
GLUCOSE BLD MANUAL STRIP-MCNC: 130 MG/DL (ref 74–99)
GLUCOSE BLD MANUAL STRIP-MCNC: 153 MG/DL (ref 74–99)
GLUCOSE BLD MANUAL STRIP-MCNC: 156 MG/DL (ref 74–99)
GLUCOSE BLD MANUAL STRIP-MCNC: 158 MG/DL (ref 74–99)
GLUCOSE BLD MANUAL STRIP-MCNC: 206 MG/DL (ref 74–99)
GLUCOSE BLD MANUAL STRIP-MCNC: 233 MG/DL (ref 74–99)
GLUCOSE SERPL-MCNC: 103 MG/DL (ref 74–99)
HCT VFR BLD AUTO: 38.2 % (ref 36–46)
HGB BLD-MCNC: 11.4 G/DL (ref 12–16)
LIPASE SERPL-CCNC: 566 U/L (ref 9–82)
MCH RBC QN AUTO: 28.9 PG (ref 26–34)
MCHC RBC AUTO-ENTMCNC: 29.8 G/DL (ref 32–36)
MCV RBC AUTO: 97 FL (ref 80–100)
NRBC BLD-RTO: 0 /100 WBCS (ref 0–0)
PHOSPHATE SERPL-MCNC: 5.8 MG/DL (ref 2.5–4.9)
PLATELET # BLD AUTO: 314 X10*3/UL (ref 150–450)
POTASSIUM SERPL-SCNC: 4.8 MMOL/L (ref 3.5–5.3)
RBC # BLD AUTO: 3.94 X10*6/UL (ref 4–5.2)
SODIUM SERPL-SCNC: 134 MMOL/L (ref 136–145)
WBC # BLD AUTO: 9.4 X10*3/UL (ref 4.4–11.3)

## 2025-01-19 PROCEDURE — 2500000001 HC RX 250 WO HCPCS SELF ADMINISTERED DRUGS (ALT 637 FOR MEDICARE OP): Performed by: STUDENT IN AN ORGANIZED HEALTH CARE EDUCATION/TRAINING PROGRAM

## 2025-01-19 PROCEDURE — 2500000004 HC RX 250 GENERAL PHARMACY W/ HCPCS (ALT 636 FOR OP/ED): Performed by: STUDENT IN AN ORGANIZED HEALTH CARE EDUCATION/TRAINING PROGRAM

## 2025-01-19 PROCEDURE — 99232 SBSQ HOSP IP/OBS MODERATE 35: CPT | Performed by: STUDENT IN AN ORGANIZED HEALTH CARE EDUCATION/TRAINING PROGRAM

## 2025-01-19 PROCEDURE — 85027 COMPLETE CBC AUTOMATED: CPT | Performed by: STUDENT IN AN ORGANIZED HEALTH CARE EDUCATION/TRAINING PROGRAM

## 2025-01-19 PROCEDURE — 1200000002 HC GENERAL ROOM WITH TELEMETRY DAILY

## 2025-01-19 PROCEDURE — 80069 RENAL FUNCTION PANEL: CPT | Performed by: STUDENT IN AN ORGANIZED HEALTH CARE EDUCATION/TRAINING PROGRAM

## 2025-01-19 PROCEDURE — 2500000005 HC RX 250 GENERAL PHARMACY W/O HCPCS: Performed by: STUDENT IN AN ORGANIZED HEALTH CARE EDUCATION/TRAINING PROGRAM

## 2025-01-19 PROCEDURE — 36415 COLL VENOUS BLD VENIPUNCTURE: CPT | Performed by: STUDENT IN AN ORGANIZED HEALTH CARE EDUCATION/TRAINING PROGRAM

## 2025-01-19 PROCEDURE — 82947 ASSAY GLUCOSE BLOOD QUANT: CPT

## 2025-01-19 PROCEDURE — 74019 RADEX ABDOMEN 2 VIEWS: CPT

## 2025-01-19 RX ORDER — CLONIDINE HYDROCHLORIDE 0.1 MG/1
0.1 TABLET ORAL EVERY 12 HOURS SCHEDULED
Status: DISCONTINUED | OUTPATIENT
Start: 2025-01-19 | End: 2025-01-20

## 2025-01-19 RX ORDER — POLYETHYLENE GLYCOL 3350, SODIUM CHLORIDE, SODIUM BICARBONATE, POTASSIUM CHLORIDE 420; 11.2; 5.72; 1.48 G/4L; G/4L; G/4L; G/4L
2000 POWDER, FOR SOLUTION ORAL ONCE AS NEEDED
Status: COMPLETED | OUTPATIENT
Start: 2025-01-19 | End: 2025-01-19

## 2025-01-19 RX ORDER — CLONIDINE HYDROCHLORIDE 0.1 MG/1
0.1 TABLET ORAL ONCE
Status: COMPLETED | OUTPATIENT
Start: 2025-01-19 | End: 2025-01-19

## 2025-01-19 RX ORDER — ADHESIVE BANDAGE
30 BANDAGE TOPICAL ONCE
Status: COMPLETED | OUTPATIENT
Start: 2025-01-19 | End: 2025-01-19

## 2025-01-19 RX ORDER — OXYCODONE HYDROCHLORIDE 5 MG/1
2.5 TABLET ORAL ONCE
Status: COMPLETED | OUTPATIENT
Start: 2025-01-19 | End: 2025-01-19

## 2025-01-19 RX ADMIN — METOCLOPRAMIDE 10 MG: 10 TABLET ORAL at 09:20

## 2025-01-19 RX ADMIN — APIXABAN 5 MG: 5 TABLET, FILM COATED ORAL at 23:22

## 2025-01-19 RX ADMIN — MELATONIN 6 MG: 3 TAB ORAL at 23:22

## 2025-01-19 RX ADMIN — ATORVASTATIN CALCIUM 80 MG: 80 TABLET, FILM COATED ORAL at 23:21

## 2025-01-19 RX ADMIN — POLYETHYLENE GLYCOL 3350 17 G: 17 POWDER, FOR SOLUTION ORAL at 09:17

## 2025-01-19 RX ADMIN — FLUTICASONE PROPIONATE 2 SPRAY: 50 SPRAY, METERED NASAL at 09:36

## 2025-01-19 RX ADMIN — METOCLOPRAMIDE 10 MG: 10 TABLET ORAL at 18:25

## 2025-01-19 RX ADMIN — SENNOSIDES AND DOCUSATE SODIUM 1 TABLET: 50; 8.6 TABLET ORAL at 09:16

## 2025-01-19 RX ADMIN — CLONIDINE HYDROCHLORIDE 0.1 MG: 0.1 TABLET ORAL at 18:24

## 2025-01-19 RX ADMIN — GABAPENTIN 300 MG: 300 CAPSULE ORAL at 23:21

## 2025-01-19 RX ADMIN — TRIMETHOBENZAMIDE HYDROCHLORIDE 200 MG: 100 INJECTION INTRAMUSCULAR at 02:15

## 2025-01-19 RX ADMIN — BISACODYL 10 MG: 10 SUPPOSITORY RECTAL at 09:24

## 2025-01-19 RX ADMIN — APIXABAN 5 MG: 5 TABLET, FILM COATED ORAL at 09:19

## 2025-01-19 RX ADMIN — DICYCLOMINE HYDROCHLORIDE 10 MG: 10 CAPSULE ORAL at 14:38

## 2025-01-19 RX ADMIN — SENNOSIDES AND DOCUSATE SODIUM 1 TABLET: 50; 8.6 TABLET ORAL at 23:21

## 2025-01-19 RX ADMIN — POLYETHYLENE GLYCOL 3350, SODIUM SULFATE ANHYDROUS, SODIUM BICARBONATE, SODIUM CHLORIDE, POTASSIUM CHLORIDE 2000 ML: 236; 22.74; 6.74; 5.86; 2.97 POWDER, FOR SOLUTION ORAL at 14:38

## 2025-01-19 RX ADMIN — CARVEDILOL 25 MG: 12.5 TABLET, FILM COATED ORAL at 23:21

## 2025-01-19 RX ADMIN — ACETAMINOPHEN 975 MG: 325 TABLET, FILM COATED ORAL at 09:18

## 2025-01-19 RX ADMIN — VALSARTAN 320 MG: 160 TABLET, FILM COATED ORAL at 23:22

## 2025-01-19 RX ADMIN — ACETAMINOPHEN 975 MG: 325 TABLET, FILM COATED ORAL at 02:18

## 2025-01-19 RX ADMIN — OXYCODONE HYDROCHLORIDE 2.5 MG: 5 TABLET ORAL at 23:20

## 2025-01-19 RX ADMIN — TORSEMIDE 20 MG: 20 TABLET ORAL at 23:21

## 2025-01-19 RX ADMIN — PETROLATUM: 420 OINTMENT TOPICAL at 09:25

## 2025-01-19 RX ADMIN — BISACODYL 10 MG: 10 SUPPOSITORY RECTAL at 21:30

## 2025-01-19 RX ADMIN — ACETAMINOPHEN 975 MG: 325 TABLET, FILM COATED ORAL at 17:47

## 2025-01-19 RX ADMIN — TRIMETHOBENZAMIDE HYDROCHLORIDE 200 MG: 100 INJECTION INTRAMUSCULAR at 21:45

## 2025-01-19 RX ADMIN — METOCLOPRAMIDE 10 MG: 10 TABLET ORAL at 00:00

## 2025-01-19 RX ADMIN — MAGNESIUM HYDROXIDE 30 ML: 400 SUSPENSION ORAL at 15:05

## 2025-01-19 RX ADMIN — CEPHALEXIN 500 MG: 500 CAPSULE ORAL at 23:21

## 2025-01-19 ASSESSMENT — COGNITIVE AND FUNCTIONAL STATUS - GENERAL
MOBILITY SCORE: 24
DAILY ACTIVITIY SCORE: 24

## 2025-01-19 ASSESSMENT — PAIN SCALES - GENERAL
PAINLEVEL_OUTOF10: 6
PAINLEVEL_OUTOF10: 10 - WORST POSSIBLE PAIN
PAINLEVEL_OUTOF10: 5 - MODERATE PAIN

## 2025-01-19 ASSESSMENT — PAIN - FUNCTIONAL ASSESSMENT: PAIN_FUNCTIONAL_ASSESSMENT: 0-10

## 2025-01-19 ASSESSMENT — PAIN DESCRIPTION - LOCATION: LOCATION: ABDOMEN

## 2025-01-19 NOTE — CARE PLAN
Problem: Discharge Planning  Goal: Discharge to home or other facility with appropriate resources  Outcome: Progressing     The clinical goals for the shift include pt will have a BM

## 2025-01-19 NOTE — NURSING NOTE
Vast consulted for PIV placement by bedside RN. Patient not new to the vast team. Patient is a dialysis patient with limb restriction, has poor vasculature, and alternative access is recommended. Bedside RN aware.

## 2025-01-19 NOTE — NURSING NOTE
Patient having abdominal cramping as well as nausea and vomiting tonight s/t constipation. Patient received tap water enema by previous shift rn around 1400 with some stool relief, but not enough. On this RN's shift, this RN administered a dulcolax suppository with no bm and also the regularly scheduled po miralax and colace/senna. Pt vomited tonight and described emesis as brownish/greenish, though pt disposed of emesis before nursing viewed it. Dr. Chavez ordered tap water enema and po zofran given. This RN administered tap water enema 600cc. No BM yet upon this RN giving report to next shift RN. This RN instructed pt that if pt vomits again then to save it for nursing staff to see and pt agreed with plan.    Also of note, pt does not have IV access. This RN reached out to IV team who informed that pt is not a VAST candidate and needs other form of access. This RN reached out to Dr. Chavez who will pass info along to day team. Pt unable to receive IV epogen this evening - Dr. Chavez aware. No subcutaneous epogen ordered. This info passed along to oncoming RN. -Glenys Stokes, PONCHO

## 2025-01-19 NOTE — PROGRESS NOTES
Assessment/Plan   Stacey Heath is a 54 y.o. 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 Pt who was admitted to the MICU on 12/29/24 for hypertensive emergency c/b flash pulmonary edema. She has recurrent admissions related to her dialysis and breathing. She was treated with cardene gtt and received UF and HD with nephrology. cardene gtt was weaned and patient resumed home anti-HTN regimen. The patient was transferred to medicine floor but had recurrent hypertensive emergency with bp to 270s requiring cardene infusion to resume. Has had severe sessions of HD and UF, planning to continue until closer to dry weight of 50kg. She had adjustments to her home antihypertensives.      Hypertensive Emergency - resolved  HFpEF, acute on chronic, resolved  - weaned off cardene drip  - still with elevated blood pressures, better controlled.   - TTE 01/03/2025 showed EF of 50-55 with left atrium severely dilated,      Mod-severe mitral annular calcification. Severely increased concentric systolic function. Dialyzing until closer to dry weight  - concern for hypotension given small LV size if pt becomes fluid depleted. Currently better controlled. Will start q4h vitals, pt is off tele now. Changed notificaiton order parameter to notify if SBP <120 to monitor for signs of volume depletion.  - bp low in hd yesterday, normal on floor.   - weaning bp meds as she is more euvolemic. Decreased clonidine to 0.1 tid 1/18, decreased to Bid dosing 1/19. Plan to attempt to wean off. Hold bp meds prior to HD to allow room for fluid removal.    Abd discomfort  Nausea/vomitting  constipation  - check kub- pending read, appears to have a large stool burden, no dilated bowel.   - added ppi  -reports last bm yesterday, passing gas  - increase miralax to bid and make senna/doc 1 tab  bid. Pt is incrasing activity. Developed some n/v, on zofran and reglan. Pt has not tried enema or supp yet, pt ok with trialing. Pt shown her xray and discussed management. Avoiding opioids for now which could worsen consitpation  - check lfts, lipase  - with continued constipation and only able to go with enema/supp. Pt still having pain. Plan to trial miralax bowel prep after discussion with patient.     R breast swelling  - with warm, edema and ttp, no erythema or drainage  - starting keflex.   - examination with nursing chaperone.   - monitor for response in next 24-48hrs. No fevers, no wbc elevation  - reports anxiety due to h/o breast cancers in family. Counselling provided.she reports she is due for screening. Plan to obtain in op setting.   - improving.     ESRD   - Dialysis T/Th/Sat   - continue HD/UF until closer to dry weight of 50kg per nephrology, currently 57kg after HD.   - may need bp dose reductions as closer to dry weight  -  hold hydralazine and clonidine the morning of Dialysis.    - Concern about dietary and fluid adherance.   - hold bp meds prior to hd    Chronic deep vein thrombosis (DVT) of brachial vein of left upper extremity   - Continue with Eliquis     Headache likely due to migraine vs Hypertensive emergency  - improved    COPD   -Continue Albuterol neb q6hr prn     Insomnia   - Melatonin and trazodone prn    Acute on chronic anemia   - in setting of esrd  - no bleeding noted  - monitor    DMII with gastroparesis  - continue reglan and zofran prn  - continue bowel regimen  - no longer on medications for dm in setting of her esrd    Continuing goals of care, consulted palliative care. Nephrology had goc meeting with behavioral jonah and would like to involve palliative in goc.  Pt appears to have better understanding of her condition since the meeting.        Scheduled outpatient appointments in system:   Future Appointments   Date Time Provider Department Center   1/27/2025  4:00 PM  "Makenna Barraza MD [id number] [facility]   3/25/2025  1:20 PM Nate Hyman MD [id number] [facility]     ---------------------------------------------------------------------------------------------------  Subjective   No events. Had bm's with enema/supp, to continue. Pt feels she cannot go without them. She is interested in trialing bowel prep over other modalities. She is oob and trying to increase activity. Her family is having a baby in the hospital and she reqeusted if she could visit the baby. Currently feeling well with respect to breathing, no cp or dizziness with position changes. No f/c.   ---------------------------------------------------------------------------------------------------  Objective   Last Recorded Vitals  Blood pressure 125/83, pulse 81, temperature 36.5 °C (97.7 °F), resp. rate 16, height 1.397 m (4' 7\"), weight 57.6 kg (126 lb 14.4 oz), SpO2 96%.  Intake/Output last 3 Shifts:  I/O last 3 completed shifts:  In: 1000 (17.4 mL/kg) [I.V.:600 (10.4 mL/kg); Other:400]  Out: 2201 (38.2 mL/kg) [Other:2201]  Weight: 57.6 kg     Physical Exam  Vitals and nursing note reviewed.   Constitutional:       General: She is not in acute distress.     Appearance: Normal appearance. She is obese.   HENT:      Head: Normocephalic and atraumatic.      Mouth/Throat:      Mouth: Mucous membranes are moist.   Eyes:      General: No scleral icterus.     Extraocular Movements: Extraocular movements intact.      Conjunctiva/sclera: Conjunctivae normal.   Cardiovascular:      Rate and Rhythm: Normal rate and regular rhythm.      Heart sounds: S1 normal and S2 normal. No murmur heard.  Pulmonary:      Effort: Pulmonary effort is normal. No respiratory distress.      Breath sounds: No wheezing, rhonchi or rales.      Comments: Diminished b/l  Abdominal:      General: Bowel sounds are normal. There is no distension.      Palpations: Abdomen is soft.      Tenderness: There is no abdominal tenderness. There is no " guarding or rebound.   Musculoskeletal:         General: No swelling or deformity.      Cervical back: Neck supple.      Comments: R breast swelling, ttp, warmth noted.  no erythema or drainage   Skin:     General: Skin is warm and dry.      Findings: No rash.   Neurological:      General: No focal deficit present.      Mental Status: She is alert and oriented to person, place, and time. Mental status is at baseline.      Sensory: No sensory deficit.      Motor: No weakness.   Psychiatric:         Mood and Affect: Mood normal.         Behavior: Behavior normal.         Relevant Results  Lab Results   Component Value Date    WBC 5.7 01/18/2025    HGB 9.0 (L) 01/18/2025    HCT 30.0 (L) 01/18/2025    MCV 96 01/18/2025     01/18/2025      Lab Results   Component Value Date    GLUCOSE 101 (H) 01/18/2025    CALCIUM 9.1 01/18/2025     01/18/2025    K 4.4 01/18/2025    CO2 28 01/18/2025    CL 97 (L) 01/18/2025    BUN 55 (H) 01/18/2025    CREATININE 6.22 (H) 01/18/2025     Scheduled medications  acetaminophen, 975 mg, oral, q8h  ammonium lactate, , Topical, BID  apixaban, 5 mg, oral, BID  aspirin, 81 mg, oral, Daily  atorvastatin, 80 mg, oral, Nightly  bisacodyl, 10 mg, rectal, Daily  carvedilol, 25 mg, oral, BID  cephalexin, 500 mg, oral, q12h BELEN  cloNIDine, 0.1 mg, oral, q8h BELEN  dilTIAZem CD, 360 mg, oral, Daily  epoetin joceline or biosimilar, 10,000 Units, intravenous, Once per day on Tuesday Thursday Saturday  fluticasone, 2 spray, Each Nostril, Daily  fluticasone furoate-vilanteroL, 1 puff, inhalation, Daily  gabapentin, 300 mg, oral, Nightly  [Held by provider] hydrALAZINE, 100 mg, oral, TID  insulin lispro, 0-10 Units, subcutaneous, TID AC  lidocaine, 1 patch, transdermal, Daily  melatonin, 6 mg, oral, Daily  metoclopramide, 10 mg, oral, q8h  oxygen, , inhalation, Continuous - Inhalation  pantoprazole, 40 mg, oral, Daily before breakfast  perflutren lipid microspheres, 0.5-10 mL of dilution, intravenous,  Once in imaging  polyethylene glycol, 17 g, oral, BID  sennosides-docusate sodium, 1 tablet, oral, BID  sevelamer carbonate, 800 mg, oral, TID  torsemide, 20 mg, oral, 2 times per day on Sunday Tuesday Thursday Saturday  valsartan, 320 mg, oral, q PM  vitamin B complex-vitamin C-folic acid, 1 capsule, oral, Daily  white petrolatum, , Topical, BID      Continuous medications     PRN medications  PRN medications: albuterol, diclofenac sodium, dicyclomine, diphenhydrAMINE, ipratropium-albuteroL, lidocaine, lidocaine-diphenhydraMINE-Maalox 1:1:1, ondansetron, pramoxine, sodium chloride, SUMAtriptan, traZODone, trimethobenzamide    Al Painter MD

## 2025-01-20 ENCOUNTER — APPOINTMENT (OUTPATIENT)
Dept: RADIOLOGY | Facility: HOSPITAL | Age: 55
DRG: 252 | End: 2025-01-20
Payer: COMMERCIAL

## 2025-01-20 LAB
ALBUMIN SERPL BCP-MCNC: 4.2 G/DL (ref 3.4–5)
ALBUMIN SERPL BCP-MCNC: 4.2 G/DL (ref 3.4–5)
ALP SERPL-CCNC: 159 U/L (ref 33–110)
ALT SERPL W P-5'-P-CCNC: 22 U/L (ref 7–45)
ANION GAP SERPL CALC-SCNC: 25 MMOL/L (ref 10–20)
AST SERPL W P-5'-P-CCNC: 32 U/L (ref 9–39)
BILIRUB DIRECT SERPL-MCNC: 0 MG/DL (ref 0–0.3)
BILIRUB SERPL-MCNC: 0.4 MG/DL (ref 0–1.2)
BUN SERPL-MCNC: 56 MG/DL (ref 6–23)
CALCIUM SERPL-MCNC: 9.7 MG/DL (ref 8.6–10.6)
CHLORIDE SERPL-SCNC: 92 MMOL/L (ref 98–107)
CO2 SERPL-SCNC: 26 MMOL/L (ref 21–32)
CREAT SERPL-MCNC: 8.88 MG/DL (ref 0.5–1.05)
EGFRCR SERPLBLD CKD-EPI 2021: 5 ML/MIN/1.73M*2
ERYTHROCYTE [DISTWIDTH] IN BLOOD BY AUTOMATED COUNT: 14.6 % (ref 11.5–14.5)
GLUCOSE BLD MANUAL STRIP-MCNC: 189 MG/DL (ref 74–99)
GLUCOSE BLD MANUAL STRIP-MCNC: 198 MG/DL (ref 74–99)
GLUCOSE BLD MANUAL STRIP-MCNC: 76 MG/DL (ref 74–99)
GLUCOSE SERPL-MCNC: 221 MG/DL (ref 74–99)
HCT VFR BLD AUTO: 37.6 % (ref 36–46)
HGB BLD-MCNC: 11.2 G/DL (ref 12–16)
MCH RBC QN AUTO: 29.1 PG (ref 26–34)
MCHC RBC AUTO-ENTMCNC: 29.8 G/DL (ref 32–36)
MCV RBC AUTO: 98 FL (ref 80–100)
NRBC BLD-RTO: 0 /100 WBCS (ref 0–0)
PHOSPHATE SERPL-MCNC: 5.6 MG/DL (ref 2.5–4.9)
PLATELET # BLD AUTO: 249 X10*3/UL (ref 150–450)
POTASSIUM SERPL-SCNC: 5.3 MMOL/L (ref 3.5–5.3)
PROT SERPL-MCNC: 7.6 G/DL (ref 6.4–8.2)
RBC # BLD AUTO: 3.85 X10*6/UL (ref 4–5.2)
SODIUM SERPL-SCNC: 138 MMOL/L (ref 136–145)
WBC # BLD AUTO: 6.3 X10*3/UL (ref 4.4–11.3)

## 2025-01-20 PROCEDURE — 2500000005 HC RX 250 GENERAL PHARMACY W/O HCPCS: Performed by: STUDENT IN AN ORGANIZED HEALTH CARE EDUCATION/TRAINING PROGRAM

## 2025-01-20 PROCEDURE — 36415 COLL VENOUS BLD VENIPUNCTURE: CPT | Performed by: STUDENT IN AN ORGANIZED HEALTH CARE EDUCATION/TRAINING PROGRAM

## 2025-01-20 PROCEDURE — 2500000001 HC RX 250 WO HCPCS SELF ADMINISTERED DRUGS (ALT 637 FOR MEDICARE OP): Performed by: STUDENT IN AN ORGANIZED HEALTH CARE EDUCATION/TRAINING PROGRAM

## 2025-01-20 PROCEDURE — 99232 SBSQ HOSP IP/OBS MODERATE 35: CPT | Performed by: STUDENT IN AN ORGANIZED HEALTH CARE EDUCATION/TRAINING PROGRAM

## 2025-01-20 PROCEDURE — 82947 ASSAY GLUCOSE BLOOD QUANT: CPT

## 2025-01-20 PROCEDURE — 1200000002 HC GENERAL ROOM WITH TELEMETRY DAILY

## 2025-01-20 PROCEDURE — 2500000004 HC RX 250 GENERAL PHARMACY W/ HCPCS (ALT 636 FOR OP/ED): Performed by: STUDENT IN AN ORGANIZED HEALTH CARE EDUCATION/TRAINING PROGRAM

## 2025-01-20 PROCEDURE — 82040 ASSAY OF SERUM ALBUMIN: CPT | Performed by: STUDENT IN AN ORGANIZED HEALTH CARE EDUCATION/TRAINING PROGRAM

## 2025-01-20 PROCEDURE — 74176 CT ABD & PELVIS W/O CONTRAST: CPT

## 2025-01-20 PROCEDURE — 84100 ASSAY OF PHOSPHORUS: CPT | Performed by: STUDENT IN AN ORGANIZED HEALTH CARE EDUCATION/TRAINING PROGRAM

## 2025-01-20 PROCEDURE — 94640 AIRWAY INHALATION TREATMENT: CPT

## 2025-01-20 PROCEDURE — 99233 SBSQ HOSP IP/OBS HIGH 50: CPT | Performed by: INTERNAL MEDICINE

## 2025-01-20 PROCEDURE — 74176 CT ABD & PELVIS W/O CONTRAST: CPT | Performed by: STUDENT IN AN ORGANIZED HEALTH CARE EDUCATION/TRAINING PROGRAM

## 2025-01-20 PROCEDURE — 85027 COMPLETE CBC AUTOMATED: CPT | Performed by: STUDENT IN AN ORGANIZED HEALTH CARE EDUCATION/TRAINING PROGRAM

## 2025-01-20 RX ORDER — CLONIDINE HYDROCHLORIDE 0.1 MG/1
0.1 TABLET ORAL 3 TIMES DAILY
Status: DISCONTINUED | OUTPATIENT
Start: 2025-01-20 | End: 2025-01-27 | Stop reason: HOSPADM

## 2025-01-20 RX ORDER — POLYETHYLENE GLYCOL 3350 17 G/17G
17 POWDER, FOR SOLUTION ORAL 2 TIMES DAILY
Status: DISCONTINUED | OUTPATIENT
Start: 2025-01-21 | End: 2025-01-22

## 2025-01-20 RX ADMIN — SENNOSIDES AND DOCUSATE SODIUM 1 TABLET: 50; 8.6 TABLET ORAL at 08:29

## 2025-01-20 RX ADMIN — Medication: at 22:33

## 2025-01-20 RX ADMIN — DICYCLOMINE HYDROCHLORIDE 10 MG: 10 CAPSULE ORAL at 15:09

## 2025-01-20 RX ADMIN — CARVEDILOL 25 MG: 12.5 TABLET, FILM COATED ORAL at 08:28

## 2025-01-20 RX ADMIN — CEPHALEXIN 500 MG: 500 CAPSULE ORAL at 08:36

## 2025-01-20 RX ADMIN — Medication 21 PERCENT: at 07:58

## 2025-01-20 RX ADMIN — APIXABAN 5 MG: 5 TABLET, FILM COATED ORAL at 22:32

## 2025-01-20 RX ADMIN — SEVELAMER CARBONATE 800 MG: 800 TABLET, FILM COATED ORAL at 08:28

## 2025-01-20 RX ADMIN — Medication: at 10:37

## 2025-01-20 RX ADMIN — SENNOSIDES AND DOCUSATE SODIUM 1 TABLET: 50; 8.6 TABLET ORAL at 22:32

## 2025-01-20 RX ADMIN — CEPHALEXIN 500 MG: 500 CAPSULE ORAL at 22:32

## 2025-01-20 RX ADMIN — CLONIDINE HYDROCHLORIDE 0.1 MG: 0.1 TABLET ORAL at 00:35

## 2025-01-20 RX ADMIN — SEVELAMER CARBONATE 800 MG: 800 TABLET, FILM COATED ORAL at 15:11

## 2025-01-20 RX ADMIN — METOCLOPRAMIDE 10 MG: 10 TABLET ORAL at 08:37

## 2025-01-20 RX ADMIN — ACETAMINOPHEN 975 MG: 325 TABLET, FILM COATED ORAL at 12:36

## 2025-01-20 RX ADMIN — CLONIDINE HYDROCHLORIDE 0.1 MG: 0.1 TABLET ORAL at 08:29

## 2025-01-20 RX ADMIN — TRAZODONE HYDROCHLORIDE 50 MG: 50 TABLET ORAL at 23:09

## 2025-01-20 RX ADMIN — CARVEDILOL 25 MG: 12.5 TABLET, FILM COATED ORAL at 22:32

## 2025-01-20 RX ADMIN — PETROLATUM: 420 OINTMENT TOPICAL at 22:32

## 2025-01-20 RX ADMIN — ACETAMINOPHEN 975 MG: 325 TABLET, FILM COATED ORAL at 18:50

## 2025-01-20 RX ADMIN — METOCLOPRAMIDE 10 MG: 10 TABLET ORAL at 15:10

## 2025-01-20 RX ADMIN — ACETAMINOPHEN 975 MG: 325 TABLET, FILM COATED ORAL at 04:58

## 2025-01-20 RX ADMIN — DILTIAZEM HYDROCHLORIDE 360 MG: 180 CAPSULE, COATED, EXTENDED RELEASE ORAL at 08:36

## 2025-01-20 RX ADMIN — PANTOPRAZOLE SODIUM 40 MG: 40 TABLET, DELAYED RELEASE ORAL at 11:30

## 2025-01-20 RX ADMIN — CLONIDINE HYDROCHLORIDE 0.1 MG: 0.1 TABLET ORAL at 15:16

## 2025-01-20 RX ADMIN — APIXABAN 5 MG: 5 TABLET, FILM COATED ORAL at 08:29

## 2025-01-20 RX ADMIN — CLONIDINE HYDROCHLORIDE 0.1 MG: 0.1 TABLET ORAL at 22:32

## 2025-01-20 RX ADMIN — BISACODYL 10 MG: 10 SUPPOSITORY RECTAL at 08:28

## 2025-01-20 RX ADMIN — ATORVASTATIN CALCIUM 80 MG: 80 TABLET, FILM COATED ORAL at 22:32

## 2025-01-20 RX ADMIN — MELATONIN 6 MG: 3 TAB ORAL at 18:50

## 2025-01-20 RX ADMIN — FLUTICASONE FUROATE AND VILANTEROL TRIFENATATE 1 PUFF: 100; 25 POWDER RESPIRATORY (INHALATION) at 07:56

## 2025-01-20 RX ADMIN — GABAPENTIN 300 MG: 300 CAPSULE ORAL at 22:32

## 2025-01-20 RX ADMIN — RENO CAPS 1 CAPSULE: 100; 1.5; 1.7; 20; 10; 1; 150; 5; 6 CAPSULE ORAL at 08:28

## 2025-01-20 RX ADMIN — Medication 21 PERCENT: at 20:00

## 2025-01-20 RX ADMIN — ASPIRIN 81 MG: 81 TABLET, COATED ORAL at 08:28

## 2025-01-20 RX ADMIN — VALSARTAN 320 MG: 160 TABLET, FILM COATED ORAL at 22:32

## 2025-01-20 RX ADMIN — DICYCLOMINE HYDROCHLORIDE 10 MG: 10 CAPSULE ORAL at 08:29

## 2025-01-20 NOTE — PROGRESS NOTES
Assessment/Plan   Stacey Heath is a 54 y.o. female PMH significant for ESRD 2/2 on dialysis T/Th/Sat via RUE AVF (last complete session 01/04/2025), HTN, HFpEF, COPD, brachial DVT on Eliquis who presented for [facility] ED for acute onset shortness of breath. Pt who was admitted to the MICU on 12/29/24 for hypertensive emergency c/b flash pulmonary edema. She has recurrent admissions related to her dialysis and breathing. She was treated with cardene gtt and received UF and HD with nephrology. cardene gtt was weaned and patient resumed home anti-HTN regimen. The patient was transferred to medicine floor but had recurrent hypertensive emergency with bp to 270s requiring cardene infusion to resume. Has had severe sessions of HD and UF, planning to continue until closer to dry weight of 50kg. She had adjustments to her home antihypertensives.Pts antihyperensives have bene weaned while removing fluids, she is closer to dry weight but still above. She did have some low bp readings as well as rebound hypertension. She developed left breast cellulitis improvng on keflex. She is now having continued diffiuculty moving  her bowels and progressing with nausea/vomitting intermittant and difficulty tolering her diet.       Hypertensive Emergency - resolved  HFpEF, acute on chronic, resolved  - weaned off cardene drip  - still with elevated blood pressures, better controlled.   - TTE 01/03/2025 showed EF of 50-55 with left atrium severely dilated,      Mod-severe mitral annular calcification. Severely increased concentric systolic function. Dialyzing until closer to dry weight  - concern for hypotension given small LV size if pt becomes fluid depleted. Currently better controlled. Will start q4h vitals, pt is off tele now. Changed notificaiton order parameter to notify if SBP <120 to monitor for signs of volume depletion.  - bp low in hd yesterday, normal on floor.   - weaning bp meds as she is more euvolemic. Decreased clonidine  to 0.1 tid 1/18, decreased to Bid dosing 1/19. Plan to attempt to wean off. Hold bp meds prior to HD to allow room for fluid removal.    Abd discomfort  Nausea/vomitting  constipation  - check kub- pending read, appears to have a large stool burden, no dilated bowel.   - added ppi  -reports last bm yesterday, passing gas  - increase miralax to bid and make senna/doc 1 tab bid. Pt is incrasing activity. Developed some n/v, on zofran and reglan.  - Avoiding opioids for now which could worsen consitpation  - despite bowel regimen and half a golytely bowel prep still continuing.   - repeat kub showing stool has moved thorough colon, concern for developing ileus now. Pt has no stool in rectal vault on xray, awaiting CT abdomen/pelvis.   - of note pt has history of gastroparesis as well. She is maximized on reglan per her renal function.     R breast swelling  - with warm, edema and ttp, no erythema or drainage  - starting keflex.   - examination with nursing chaperone.   - monitor for response in next 24-48hrs. No fevers, no wbc elevation  - reports anxiety due to h/o breast cancers in family. Counselling provided.she reports she is due for screening. Plan to obtain in op setting.   - improving.     ESRD   - Dialysis T/Th/Sat   - continue HD/UF until closer to dry weight of 50kg per nephrology, currently 57kg after HD.   - may need bp dose reductions as closer to dry weight  -  hold hydralazine and clonidine the morning of Dialysis.    - Concern about dietary and fluid adherance.   - hold bp meds prior to hd    Chronic deep vein thrombosis (DVT) of brachial vein of left upper extremity   - Continue with Eliquis     Headache likely due to migraine vs Hypertensive emergency  - improved    COPD   -Continue Albuterol neb q6hr prn     Insomnia   - Melatonin and trazodone prn    Acute on chronic anemia   - in setting of esrd  - no bleeding noted  - monitor    DMII with gastroparesis  - continue reglan and zofran prn  -  "continue bowel regimen  - no longer on medications for dm in setting of her esrd  - pt on reglan.     Continuing goals of care, consulted palliative care. Nephrology had [facility] meeting with behavioral jonah and would like to involve palliative in goc.  Pt appears to have better understanding of her condition since the meeting.        Scheduled outpatient appointments in system:   Future Appointments   Date Time Provider Department Center   1/27/2025  4:00 PM Makenna Barraza MD [id number] [facility]   3/25/2025  1:20 PM Nate Hyman MD [id number] [facility]     ---------------------------------------------------------------------------------------------------  Subjective   No events. Pt elevated yesterday due to rebound. Elevated again. Pt having some discomfort and trouble tolerating diet, diffuclty moving bowel, only able to tolerate small amounts of solids due to nausea. No f/c.   ---------------------------------------------------------------------------------------------------  Objective   Last Recorded Vitals  Blood pressure (!) 221/109, pulse 88, temperature 36.7 °C (98.1 °F), resp. rate 18, height 1.397 m (4' 7\"), weight 57.6 kg (126 lb 14.4 oz), SpO2 98%.  Intake/Output last 3 Shifts:  I/O last 3 completed shifts:  In: - (0 mL/kg)   Out: 75 (1.3 mL/kg) [Emesis/NG output:75]  Weight: 57.6 kg     Physical Exam  Vitals and nursing note reviewed.   Constitutional:       General: She is not in acute distress.     Appearance: Normal appearance. She is obese.   HENT:      Head: Normocephalic and atraumatic.      Mouth/Throat:      Mouth: Mucous membranes are moist.   Eyes:      General: No scleral icterus.     Extraocular Movements: Extraocular movements intact.      Conjunctiva/sclera: Conjunctivae normal.   Cardiovascular:      Rate and Rhythm: Normal rate and regular rhythm.      Heart sounds: S1 normal and S2 normal. No murmur heard.  Pulmonary:      Effort: Pulmonary effort is normal. No respiratory " distress.      Breath sounds: No wheezing, rhonchi or rales.      Comments: Diminished b/l  Abdominal:      General: Bowel sounds are normal. There is no distension.      Palpations: Abdomen is soft.      Tenderness: There is no abdominal tenderness. There is no guarding or rebound.   Musculoskeletal:         General: No swelling or deformity.      Cervical back: Neck supple.      Comments: R breast swelling, ttp, warmth noted.  no erythema or drainage   Skin:     General: Skin is warm and dry.      Findings: No rash.   Neurological:      General: No focal deficit present.      Mental Status: She is alert and oriented to person, place, and time. Mental status is at baseline.      Sensory: No sensory deficit.      Motor: No weakness.   Psychiatric:         Mood and Affect: Mood normal.         Behavior: Behavior normal.         Relevant Results  Lab Results   Component Value Date    WBC 9.4 01/19/2025    HGB 11.4 (L) 01/19/2025    HCT 38.2 01/19/2025    MCV 97 01/19/2025     01/19/2025      Lab Results   Component Value Date    GLUCOSE 103 (H) 01/19/2025    CALCIUM 10.7 (H) 01/19/2025     (L) 01/19/2025    K 4.8 01/19/2025    CO2 23 01/19/2025    CL 93 (L) 01/19/2025    BUN 38 (H) 01/19/2025    CREATININE 6.22 (H) 01/19/2025     Scheduled medications  acetaminophen, 975 mg, oral, q8h  ammonium lactate, , Topical, BID  apixaban, 5 mg, oral, BID  aspirin, 81 mg, oral, Daily  atorvastatin, 80 mg, oral, Nightly  bisacodyl, 10 mg, rectal, Daily  carvedilol, 25 mg, oral, BID  cephalexin, 500 mg, oral, q12h BELEN  cloNIDine, 0.1 mg, oral, q12h BELEN  dilTIAZem CD, 360 mg, oral, Daily  epoetin joceline or biosimilar, 10,000 Units, intravenous, Once per day on Tuesday Thursday Saturday  fluticasone, 2 spray, Each Nostril, Daily  fluticasone furoate-vilanteroL, 1 puff, inhalation, Daily  gabapentin, 300 mg, oral, Nightly  [Held by provider] hydrALAZINE, 100 mg, oral, TID  insulin lispro, 0-10 Units, subcutaneous, TID  AC  lidocaine, 1 patch, transdermal, Daily  melatonin, 6 mg, oral, Daily  metoclopramide, 10 mg, oral, q8h  oxygen, , inhalation, Continuous - Inhalation  pantoprazole, 40 mg, oral, Daily before breakfast  perflutren lipid microspheres, 0.5-10 mL of dilution, intravenous, Once in imaging  [Held by provider] polyethylene glycol, 17 g, oral, BID  sennosides-docusate sodium, 1 tablet, oral, BID  sevelamer carbonate, 800 mg, oral, TID  torsemide, 20 mg, oral, 2 times per day on Sunday Tuesday Thursday Saturday  valsartan, 320 mg, oral, q PM  vitamin B complex-vitamin C-folic acid, 1 capsule, oral, Daily  white petrolatum, , Topical, BID      Continuous medications     PRN medications  PRN medications: albuterol, diclofenac sodium, dicyclomine, diphenhydrAMINE, ipratropium-albuteroL, lidocaine, lidocaine-diphenhydraMINE-Maalox 1:1:1, ondansetron, pramoxine, sodium chloride, SUMAtriptan, traZODone, trimethobenzamide    Al Painter MD

## 2025-01-20 NOTE — PROGRESS NOTES
Nephrology Follow-up Note   Patient ID: Stacey Heath is a 54 y.o. female.   Admitted for :   Chief Complaint   Patient presents with    Shortness of Breath      Evaluation of patient on dialysis at [facility]  [address]  [facility] [id number] on 12/29/2024  Labs and events reviewed.   Clonidine in being tapered ; BP high this AM after missed evening dose       Subjective:   Pt complains of moderate abdominal pain and constipation Was unable to eat much this weekend due to pain   Otherwise feels OK, no c/o CP/SOB/F/C    Patient Active Problem List   Diagnosis    Anxiety    Astigmatism    Carotid bruit    Diabetes mellitus type 2, uncomplicated (Multi)    Coronary artery disease involving native coronary artery    Iron deficiency anemia    Migraine    Neuropathy    Transplant    Polyneuropathy due to type 2 diabetes mellitus (Multi)    Nuclear senile cataract of both eyes    Normocytic normochromic anemia    Low back pain    Hidradenitis    Complex dyslipidemia    Chronic heart failure with preserved ejection fraction (HFpEF)    Malnutrition of moderate degree (Multi)    Kidney transplant candidate    Nonrheumatic mitral valve regurgitation    Chronic deep vein thrombosis (DVT) of brachial vein of left upper extremity (Multi)    Acute diastolic CHF (congestive heart failure)    Hypertensive emergency    Resistant hypertension    Congestive heart failure    ESRD (end stage renal disease) on dialysis (Multi)    Flash pulmonary edema    Sleep-related breathing disorder    Congestive heart failure, unspecified HF chronicity, unspecified heart failure type    COPD exacerbation (Multi)       Scheduled medications:  acetaminophen, 975 mg, oral, q8h  ammonium lactate, , Topical, BID  apixaban, 5 mg, oral, BID  aspirin, 81 mg, oral, Daily  atorvastatin, 80 mg, oral, Nightly  bisacodyl, 10 mg, rectal, Daily  carvedilol, 25 mg, oral, BID  cephalexin, 500 mg, oral, q12h BELEN  cloNIDine, 0.1  mg, oral, q12h BELEN  dilTIAZem CD, 360 mg, oral, Daily  epoetin joceline or biosimilar, 10,000 Units, intravenous, Once per day on Tuesday Thursday Saturday  fluticasone, 2 spray, Each Nostril, Daily  fluticasone furoate-vilanteroL, 1 puff, inhalation, Daily  gabapentin, 300 mg, oral, Nightly  [Held by provider] hydrALAZINE, 100 mg, oral, TID  insulin lispro, 0-10 Units, subcutaneous, TID AC  lidocaine, 1 patch, transdermal, Daily  melatonin, 6 mg, oral, Daily  metoclopramide, 10 mg, oral, q8h  oxygen, , inhalation, Continuous - Inhalation  pantoprazole, 40 mg, oral, Daily before breakfast  perflutren lipid microspheres, 0.5-10 mL of dilution, intravenous, Once in imaging  [Held by provider] polyethylene glycol, 17 g, oral, BID  sennosides-docusate sodium, 1 tablet, oral, BID  sevelamer carbonate, 800 mg, oral, TID  torsemide, 20 mg, oral, 2 times per day on Sunday Tuesday Thursday Saturday  valsartan, 320 mg, oral, q PM  vitamin B complex-vitamin C-folic acid, 1 capsule, oral, Daily  white petrolatum, , Topical, BID         PRN medications: albuterol, diclofenac sodium, dicyclomine, diphenhydrAMINE, ipratropium-albuteroL, lidocaine, lidocaine-diphenhydraMINE-Maalox 1:1:1, ondansetron, pramoxine, sodium chloride, SUMAtriptan, traZODone, trimethobenzamide     Heart Rate:  []   Temp:  [36.5 °C (97.7 °F)-36.7 °C (98.1 °F)]   Resp:  [16-18]   BP: (168-243)/()   SpO2:  [97 %-100 %]    Weight: 55.3 kg (122 lb)   Gen: alert, NAD  HEENT: NC/AT  Neck: supple, no JVD  Pulm: clear b/l ; no crackles   CVS: RRR, no rub  Abd: S/ND, mild;y tender throughout   LE: no edema , no cyanosis   Dialysis acces:  RUEAVF     Lab Results   Component Value Date    WBC 9.4 01/19/2025    HGB 11.4 (L) 01/19/2025    HCT 38.2 01/19/2025    MCV 97 01/19/2025     01/19/2025     Lab Results   Component Value Date    GLUCOSE 103 (H) 01/19/2025    CALCIUM 10.7 (H) 01/19/2025     (L) 01/19/2025    K 4.8 01/19/2025    CO2 23  01/19/2025    CL 93 (L) 01/19/2025    BUN 38 (H) 01/19/2025    CREATININE 6.22 (H) 01/19/2025     Results from last 72 hours   Lab Units 01/19/25  0808   ALBUMIN g/dL 4.7   GLUCOSE mg/dL 103*   HEMOGLOBIN g/dL 11.4*   WBC AUTO x10*3/uL 9.4      Results from last 72 hours   Lab Units 01/19/25  0808   SODIUM mmol/L 134*   POTASSIUM mmol/L 4.8   CO2 mmol/L 23   BUN mg/dL 38*   CREATININE mg/dL 6.22*   PHOSPHORUS mg/dL 5.8*   CALCIUM mg/dL 10.7*        Assessment   ESRD-HD admitted with SOB  ESRD-HD admitted with SOB  HFpEF; severe calcified MV    Close euvolemia on exam ; weight form this AM pending   Currently being treated for constipation    Plan   Plan HD tomorrow per submitted orders, UF 2-3L as tolerated; weight to be obtained before/after HD   MBD - Phosphorus binder: continue with Sevelamer AC  Anemia of CKD: Hg > 11 on 1/19 ; repeat today; may need to hold EPO tomorrow if it remains > 11   Access: no issues   BP: very high this AM and meds are being adjusted by primary team   Renal Diet   Daily renal MVI   Continue 3 x per week hemodialysis; TTS  Haydee Mosher MD MPH

## 2025-01-20 NOTE — PROGRESS NOTES
Stacey Heath  Age: 54 y.o.  MRN: [id number]  Date: 1/17/2025  Location of service: in community    Program Details  Medicaid Community Clinical Case Management  Status: Enrolled  Effective Dates: 10/17/2023 - present  Responsible Staff: NAREN Davidson      Goals Reviewed:  Problem: Access to Care Issue       Goal: Assess and Address Access Barriers       Priority: Medium        Problem: Anxiety       Goal: Maintain coping skills for continuous improvement       Priority: Medium        Problem: Financial Stressors       Goal: Assistance with financial concerns         Problem: Kidney Issues       Goal: Improve health to get on kidney transplant list       Priority: High        Problem: Medication Adherence       Goal: Adherence to Medication Regimen       Priority: High        Problem: Negative Experience, Conflict with, or Distrust of Providers and/or Health System       Goal: Plan to Address Patient Specific Negative Experience, Distrust, or Conflict with Providers and/or Health System       Priority: High        Problem: Risk of Uncoordinated Care       Goal: Care will be Coordinated and Supported by a Multidisciplinary Team of Providers       Priority: High          Summary:  This provider met with patient at [facility] where patient has been admitted since December 29th, 2024. This provider listened with empathy as patient expressed feeling as if she is being attacked for some of her lifestyle choices. This provider attempted to clarify why there is such a big focus on patient's diet. This provider offered a listening ear as patient disclosed that she is at peace with her health conditions and potential results.                    NAREN Davidson

## 2025-01-20 NOTE — PROGRESS NOTES
Recreation Therapy Note    Therapy Session  Visit Type: Follow-up visit  Session Start Time: 1430  Session End Time: 1520  Intervention Delivery: In-person  Conflict of Service: None  Number of family members present: 0  Family Present for Session: None  Family Participation: None  Number of staff members present: 1    Pre-assessment  Mood/Affect: Appropriate, Calm  Verbalized Emotional State: Happiness    Treatment  Areas of Focus: Coping, Socialization, Self-expression, Normalization, Stress reduction  Co-Treatment:  (none)  Interruption: No  Patient Fell Asleep at End of Session: No    Post-assessment  Mood/Affect: Appropriate, Calm, Cooperative, Participative  Verbalized Emotional State: Happiness  Continue Visiting: Yes  Total Session Time (min): 50 minutes    Narrative  Assessment Detail: Patient was sitting on a chair beside her bed upon arrival.  Plan: To encourage the exploration of safe and effective positive leisure coping skills to manage situational stressors.  Intervention: Patient chose to make a glitter butterfly.  Evaluation: Patient was pleasant and invested in the project.  Happy that her grandchild was born this morning and that she was able to go see him.  Follow-up: Will continue to encourage the exploration of safe and effective positive leisure coping skills to manage situational stressors.  Patient Comments: See above.

## 2025-01-21 ENCOUNTER — APPOINTMENT (OUTPATIENT)
Dept: RADIOLOGY | Facility: HOSPITAL | Age: 55
DRG: 252 | End: 2025-01-21
Payer: COMMERCIAL

## 2025-01-21 ENCOUNTER — APPOINTMENT (OUTPATIENT)
Dept: DIALYSIS | Facility: HOSPITAL | Age: 55
End: 2025-01-21
Payer: COMMERCIAL

## 2025-01-21 ENCOUNTER — DOCUMENTATION (OUTPATIENT)
Dept: BEHAVIORAL HEALTH | Facility: CLINIC | Age: 55
End: 2025-01-21
Payer: COMMERCIAL

## 2025-01-21 LAB
ALBUMIN SERPL BCP-MCNC: 4.3 G/DL (ref 3.4–5)
ANION GAP SERPL CALC-SCNC: 28 MMOL/L (ref 10–20)
BUN SERPL-MCNC: 65 MG/DL (ref 6–23)
CALCIUM SERPL-MCNC: 10.1 MG/DL (ref 8.6–10.6)
CHLORIDE SERPL-SCNC: 90 MMOL/L (ref 98–107)
CO2 SERPL-SCNC: 25 MMOL/L (ref 21–32)
CREAT SERPL-MCNC: 10.38 MG/DL (ref 0.5–1.05)
EGFRCR SERPLBLD CKD-EPI 2021: 4 ML/MIN/1.73M*2
ERYTHROCYTE [DISTWIDTH] IN BLOOD BY AUTOMATED COUNT: 14.8 % (ref 11.5–14.5)
GLUCOSE BLD MANUAL STRIP-MCNC: 188 MG/DL (ref 74–99)
GLUCOSE BLD MANUAL STRIP-MCNC: 350 MG/DL (ref 74–99)
GLUCOSE BLD MANUAL STRIP-MCNC: 92 MG/DL (ref 74–99)
GLUCOSE BLD MANUAL STRIP-MCNC: 98 MG/DL (ref 74–99)
GLUCOSE SERPL-MCNC: 63 MG/DL (ref 74–99)
HCT VFR BLD AUTO: 34.5 % (ref 36–46)
HGB BLD-MCNC: 10.6 G/DL (ref 12–16)
MCH RBC QN AUTO: 29 PG (ref 26–34)
MCHC RBC AUTO-ENTMCNC: 30.7 G/DL (ref 32–36)
MCV RBC AUTO: 94 FL (ref 80–100)
NRBC BLD-RTO: 0 /100 WBCS (ref 0–0)
PHOSPHATE SERPL-MCNC: 6.6 MG/DL (ref 2.5–4.9)
PLATELET # BLD AUTO: 259 X10*3/UL (ref 150–450)
POTASSIUM SERPL-SCNC: 5.2 MMOL/L (ref 3.5–5.3)
RBC # BLD AUTO: 3.66 X10*6/UL (ref 4–5.2)
SODIUM SERPL-SCNC: 138 MMOL/L (ref 136–145)
WBC # BLD AUTO: 6.6 X10*3/UL (ref 4.4–11.3)

## 2025-01-21 PROCEDURE — 1200000002 HC GENERAL ROOM WITH TELEMETRY DAILY

## 2025-01-21 PROCEDURE — 82947 ASSAY GLUCOSE BLOOD QUANT: CPT

## 2025-01-21 PROCEDURE — 2500000004 HC RX 250 GENERAL PHARMACY W/ HCPCS (ALT 636 FOR OP/ED): Performed by: STUDENT IN AN ORGANIZED HEALTH CARE EDUCATION/TRAINING PROGRAM

## 2025-01-21 PROCEDURE — 76705 ECHO EXAM OF ABDOMEN: CPT

## 2025-01-21 PROCEDURE — 2500000001 HC RX 250 WO HCPCS SELF ADMINISTERED DRUGS (ALT 637 FOR MEDICARE OP): Performed by: STUDENT IN AN ORGANIZED HEALTH CARE EDUCATION/TRAINING PROGRAM

## 2025-01-21 PROCEDURE — 85027 COMPLETE CBC AUTOMATED: CPT | Performed by: STUDENT IN AN ORGANIZED HEALTH CARE EDUCATION/TRAINING PROGRAM

## 2025-01-21 PROCEDURE — 99233 SBSQ HOSP IP/OBS HIGH 50: CPT | Performed by: STUDENT IN AN ORGANIZED HEALTH CARE EDUCATION/TRAINING PROGRAM

## 2025-01-21 PROCEDURE — 2500000005 HC RX 250 GENERAL PHARMACY W/O HCPCS: Performed by: STUDENT IN AN ORGANIZED HEALTH CARE EDUCATION/TRAINING PROGRAM

## 2025-01-21 PROCEDURE — 36415 COLL VENOUS BLD VENIPUNCTURE: CPT | Performed by: STUDENT IN AN ORGANIZED HEALTH CARE EDUCATION/TRAINING PROGRAM

## 2025-01-21 PROCEDURE — 80069 RENAL FUNCTION PANEL: CPT | Performed by: STUDENT IN AN ORGANIZED HEALTH CARE EDUCATION/TRAINING PROGRAM

## 2025-01-21 PROCEDURE — 90935 HEMODIALYSIS ONE EVALUATION: CPT | Performed by: INTERNAL MEDICINE

## 2025-01-21 PROCEDURE — 76705 ECHO EXAM OF ABDOMEN: CPT | Performed by: RADIOLOGY

## 2025-01-21 RX ADMIN — ONDANSETRON 4 MG: 2 INJECTION INTRAMUSCULAR; INTRAVENOUS at 12:54

## 2025-01-21 RX ADMIN — SENNOSIDES AND DOCUSATE SODIUM 1 TABLET: 50; 8.6 TABLET ORAL at 22:08

## 2025-01-21 RX ADMIN — TORSEMIDE 20 MG: 20 TABLET ORAL at 14:38

## 2025-01-21 RX ADMIN — TRAZODONE HYDROCHLORIDE 50 MG: 50 TABLET ORAL at 22:07

## 2025-01-21 RX ADMIN — APIXABAN 5 MG: 5 TABLET, FILM COATED ORAL at 14:37

## 2025-01-21 RX ADMIN — CARVEDILOL 25 MG: 12.5 TABLET, FILM COATED ORAL at 14:37

## 2025-01-21 RX ADMIN — CEPHALEXIN 500 MG: 500 CAPSULE ORAL at 14:40

## 2025-01-21 RX ADMIN — Medication: at 22:11

## 2025-01-21 RX ADMIN — CLONIDINE HYDROCHLORIDE 0.1 MG: 0.1 TABLET ORAL at 14:39

## 2025-01-21 RX ADMIN — SEVELAMER CARBONATE 800 MG: 800 TABLET, FILM COATED ORAL at 19:27

## 2025-01-21 RX ADMIN — SEVELAMER CARBONATE 800 MG: 800 TABLET, FILM COATED ORAL at 08:17

## 2025-01-21 RX ADMIN — ASPIRIN 81 MG: 81 TABLET, COATED ORAL at 14:37

## 2025-01-21 RX ADMIN — METOCLOPRAMIDE 10 MG: 10 TABLET ORAL at 19:27

## 2025-01-21 RX ADMIN — PETROLATUM: 420 OINTMENT TOPICAL at 22:10

## 2025-01-21 RX ADMIN — ACETAMINOPHEN 975 MG: 325 TABLET, FILM COATED ORAL at 19:28

## 2025-01-21 RX ADMIN — RENO CAPS 1 CAPSULE: 100; 1.5; 1.7; 20; 10; 1; 150; 5; 6 CAPSULE ORAL at 14:51

## 2025-01-21 RX ADMIN — POLYETHYLENE GLYCOL 3350 17 G: 17 POWDER, FOR SOLUTION ORAL at 22:06

## 2025-01-21 RX ADMIN — CEPHALEXIN 500 MG: 500 CAPSULE ORAL at 22:08

## 2025-01-21 RX ADMIN — DICYCLOMINE HYDROCHLORIDE 10 MG: 10 CAPSULE ORAL at 08:17

## 2025-01-21 RX ADMIN — ACETAMINOPHEN 975 MG: 325 TABLET, FILM COATED ORAL at 01:52

## 2025-01-21 RX ADMIN — CLONIDINE HYDROCHLORIDE 0.1 MG: 0.1 TABLET ORAL at 22:08

## 2025-01-21 RX ADMIN — POLYETHYLENE GLYCOL 3350 17 G: 17 POWDER, FOR SOLUTION ORAL at 14:35

## 2025-01-21 RX ADMIN — METOCLOPRAMIDE 10 MG: 10 TABLET ORAL at 01:53

## 2025-01-21 RX ADMIN — PETROLATUM: 420 OINTMENT TOPICAL at 14:34

## 2025-01-21 RX ADMIN — Medication: at 14:34

## 2025-01-21 RX ADMIN — SEVELAMER CARBONATE 800 MG: 800 TABLET, FILM COATED ORAL at 14:38

## 2025-01-21 RX ADMIN — APIXABAN 5 MG: 5 TABLET, FILM COATED ORAL at 22:08

## 2025-01-21 RX ADMIN — GABAPENTIN 300 MG: 300 CAPSULE ORAL at 22:07

## 2025-01-21 RX ADMIN — METOCLOPRAMIDE 10 MG: 10 TABLET ORAL at 14:39

## 2025-01-21 RX ADMIN — SENNOSIDES AND DOCUSATE SODIUM 1 TABLET: 50; 8.6 TABLET ORAL at 14:38

## 2025-01-21 RX ADMIN — DILTIAZEM HYDROCHLORIDE 360 MG: 180 CAPSULE, COATED, EXTENDED RELEASE ORAL at 14:40

## 2025-01-21 RX ADMIN — DICYCLOMINE HYDROCHLORIDE 10 MG: 10 CAPSULE ORAL at 22:07

## 2025-01-21 RX ADMIN — PANTOPRAZOLE SODIUM 40 MG: 40 TABLET, DELAYED RELEASE ORAL at 06:29

## 2025-01-21 RX ADMIN — ATORVASTATIN CALCIUM 80 MG: 80 TABLET, FILM COATED ORAL at 22:08

## 2025-01-21 RX ADMIN — MELATONIN 6 MG: 3 TAB ORAL at 19:34

## 2025-01-21 RX ADMIN — ACETAMINOPHEN 975 MG: 325 TABLET, FILM COATED ORAL at 12:48

## 2025-01-21 ASSESSMENT — PAIN SCALES - GENERAL
PAINLEVEL_OUTOF10: 0 - NO PAIN
PAINLEVEL_OUTOF10: 8
PAINLEVEL_OUTOF10: 1
PAINLEVEL_OUTOF10: 0 - NO PAIN
PAINLEVEL_OUTOF10: 6

## 2025-01-21 ASSESSMENT — COGNITIVE AND FUNCTIONAL STATUS - GENERAL
CLIMB 3 TO 5 STEPS WITH RAILING: A LITTLE
MOBILITY SCORE: 23
DAILY ACTIVITIY SCORE: 24

## 2025-01-21 ASSESSMENT — PAIN SCALES - WONG BAKER: WONGBAKER_NUMERICALRESPONSE: NO HURT

## 2025-01-21 ASSESSMENT — PAIN DESCRIPTION - LOCATION
LOCATION: ABDOMEN
LOCATION: ABDOMEN

## 2025-01-21 NOTE — CARE PLAN
Problem: Discharge Planning  Goal: Discharge to home or other facility with appropriate resources  Outcome: Progressing   The patient's goals for the shift include      The clinical goals for the shift include remain free from pain

## 2025-01-21 NOTE — NURSING NOTE
Report from Sending RN:    Report From: Abram ( RN)  Recent Surgery of Procedure: No  Baseline Level of Consciousness (LOC): a/o x 4  Oxygen Use: No  Type: none  Diabetic: No  Last BP Med Given Day of Dialysis: none  Last Pain Med Given: none  Lab Tests to be Obtained with Dialysis: No  Blood Transfusion to be Given During Dialysis: No  Available IV Access: Yes  Medications to be Administered During Dialysis: No  Continuous IV Infusion Running: No  Restraints on Currently or in the Last 24 Hours: No  Hand-Off Communication: No acute overnight or morning events; vss; Pt did not take morning medications; Pt will not need labs; Pt is a full code. Cassidy Kenyon RN.  Dialysis Catheter Dressing: right AVF  Last Dressing Change: will assess when Pt arrives to the unit

## 2025-01-21 NOTE — PROGRESS NOTES
Nephrology Follow-up Note   Patient ID: Stacey Heath is a 54 y.o. female.   Admitted for :   Chief Complaint   Patient presents with    Shortness of Breath      Evaluation of patient on dialysis at [facility]  [address]  [facility] [id number] on 12/29/2024  Seen and examined during hemodialysis. Labs and events reviewed.      Subjective:   Feels OK, no c/o CP/SOB/F/C  No c/o related to HD   Abd pain is about the same as yesterday ; not able to tolerate po intake ; is now on liquid diet due to CT scan findings     Patient Active Problem List   Diagnosis    Anxiety    Astigmatism    Carotid bruit    Diabetes mellitus type 2, uncomplicated (Multi)    Coronary artery disease involving native coronary artery    Iron deficiency anemia    Migraine    Neuropathy    Transplant    Polyneuropathy due to type 2 diabetes mellitus (Multi)    Nuclear senile cataract of both eyes    Normocytic normochromic anemia    Low back pain    Hidradenitis    Complex dyslipidemia    Chronic heart failure with preserved ejection fraction (HFpEF)    Malnutrition of moderate degree (Multi)    Kidney transplant candidate    Nonrheumatic mitral valve regurgitation    Chronic deep vein thrombosis (DVT) of brachial vein of left upper extremity (Multi)    Acute diastolic CHF (congestive heart failure)    Hypertensive emergency    Resistant hypertension    Congestive heart failure    ESRD (end stage renal disease) on dialysis (Multi)    Flash pulmonary edema    Sleep-related breathing disorder    Congestive heart failure, unspecified HF chronicity, unspecified heart failure type    COPD exacerbation (Multi)       Scheduled medications:  acetaminophen, 975 mg, oral, q8h  ammonium lactate, , Topical, BID  apixaban, 5 mg, oral, BID  aspirin, 81 mg, oral, Daily  atorvastatin, 80 mg, oral, Nightly  bisacodyl, 10 mg, rectal, Daily  carvedilol, 25 mg, oral, BID  cephalexin, 500 mg, oral, q12h BELEN  cloNIDine, 0.1 mg,  oral, TID  dilTIAZem CD, 360 mg, oral, Daily  epoetin joceline or biosimilar, 10,000 Units, intravenous, Once per day on Tuesday Thursday Saturday  fluticasone, 2 spray, Each Nostril, Daily  fluticasone furoate-vilanteroL, 1 puff, inhalation, Daily  gabapentin, 300 mg, oral, Nightly  [Held by provider] hydrALAZINE, 100 mg, oral, TID  insulin lispro, 0-10 Units, subcutaneous, TID AC  lidocaine, 1 patch, transdermal, Daily  melatonin, 6 mg, oral, Daily  metoclopramide, 10 mg, oral, q8h  oxygen, , inhalation, Continuous - Inhalation  pantoprazole, 40 mg, oral, Daily before breakfast  perflutren lipid microspheres, 0.5-10 mL of dilution, intravenous, Once in imaging  polyethylene glycol, 17 g, oral, BID  sennosides-docusate sodium, 1 tablet, oral, BID  sevelamer carbonate, 800 mg, oral, TID  torsemide, 20 mg, oral, 2 times per day on Sunday Tuesday Thursday Saturday  valsartan, 320 mg, oral, q PM  vitamin B complex-vitamin C-folic acid, 1 capsule, oral, Daily  white petrolatum, , Topical, BID         PRN medications: albuterol, diclofenac sodium, dicyclomine, diphenhydrAMINE, ipratropium-albuteroL, lidocaine, lidocaine-diphenhydraMINE-Maalox 1:1:1, ondansetron, pramoxine, sodium chloride, SUMAtriptan, traZODone, trimethobenzamide     Heart Rate:  [66-87]   Temp:  [36.2 °C (97.2 °F)-36.8 °C (98.2 °F)]   Resp:  [18]   BP: (160-215)/(68-96)   SpO2:  [93 %-98 %]    Weight: 55.3 kg (122 lb)   Gen: alert, NAD  HEENT: NC/AT  Neck: supple  Pulm: clear ant b/l   CVS: RRR, no rub  Abd: S/NT/ND  LE: no edema , no cyanosis   Dialysis acces:  RUEAVF     Lab Results   Component Value Date    WBC 6.6 01/21/2025    HGB 10.6 (L) 01/21/2025    HCT 34.5 (L) 01/21/2025    MCV 94 01/21/2025     01/21/2025     Lab Results   Component Value Date    GLUCOSE 63 (L) 01/21/2025    CALCIUM 10.1 01/21/2025     01/21/2025    K 5.2 01/21/2025    CO2 25 01/21/2025    CL 90 (L) 01/21/2025    BUN 65 (H) 01/21/2025    CREATININE 10.38 (H)  01/21/2025     Results from last 72 hours   Lab Units 01/21/25  0733   ALBUMIN g/dL 4.3   GLUCOSE mg/dL 63*   HEMOGLOBIN g/dL 10.6*   WBC AUTO x10*3/uL 6.6      Results from last 72 hours   Lab Units 01/21/25  0733   SODIUM mmol/L 138   POTASSIUM mmol/L 5.2   CO2 mmol/L 25   BUN mg/dL 65*   CREATININE mg/dL 10.38*   PHOSPHORUS mg/dL 6.6*   CALCIUM mg/dL 10.1        Assessment   ESRD-HD admitted with pulmonary edema  Pre HD wt 56.7 kg   Now being treated for pancreatitis    Plan   Plan HD per submitted orders, UF 2-3L as tolerated  MBD - Phosphorus binder: continue with Sevelamer AC  Anemia of CKD: EPO to be given x 3 per week   Access: no issues   BP: acceptable during treatment   Renal Diet   Daily renal MVI   Continue 3 x per week hemodialysis; TTS  Haydee Mosher MD MPH

## 2025-01-21 NOTE — PROGRESS NOTES
Assessment/Plan     Stacey Heath is a 54 y.o. female PMH significant for ESRD 2/2 on dialysis T/Th/Sat via RUE AVF (last complete session 01/04/2025), HTN, HFpEF, COPD, brachial DVT on Eliquis initially managed for htn emergency and pulm edema in micu. Has been significantly above dry weight. Have been reducing her clonidine for now while removing volume. She developed left breast cellulitis improvng on keflex. She is now having continued diffiuculty moving her bowels and progressing with nausea/vomitting intermittant and difficulty tolering her diet.  - Continuing to try to get down to 50kg.  concerned she will drop her BP given her small heart cLVH/HFpEF, and have been weaning down the antihypertensives. Had some rebound    hypertension. Consider going back to clonidine 0.1mg TID. going to see how she reponds to hd.    - Has bowel movement ON    - Breast cellulitis (swalling/warmth tenderness), improving galen Keflex.    - Renal is considering PD in the future as a more stable treatment.    - Palliative care involved     - Need to give healthy at home on dc, home care, maximize support        Stacey Heath is a 54 y.o. female PMH significant for ESRD 2/2 on dialysis T/Th/Sat via RUE AVF (last complete session 01/04/2025), HTN, HFpEF, COPD, brachial DVT on Eliquis who presented for [facility] ED for acute onset shortness of breath. Pt who was admitted to the MICU on 12/29/24 for hypertensive emergency c/b flash pulmonary edema. She has recurrent admissions related to her dialysis and breathing. She was treated with cardene gtt and received UF and HD with nephrology. cardene gtt was weaned and patient resumed home anti-HTN regimen. The patient was transferred to medicine floor but had recurrent hypertensive emergency with bp to 270s requiring cardene infusion to resume. Has had severe sessions of HD and UF, planning to continue until closer to dry weight of 50kg. She had adjustments to her home antihypertensives.Pts  antihyperensives have bene weaned while removing fluids, she is closer to dry weight but still above. She did have some low bp readings as well as rebound hypertension. She developed left breast cellulitis improvng on keflex. She is now having continued diffiuculty moving  her bowels and progressing with nausea/vomitting intermittant and difficulty tolering her diet.       Hypertensive Emergency - resolved  HFpEF, acute on chronic, resolved  - weaned off cardene drip  - still with elevated blood pressures, better controlled.   - TTE 01/03/2025 showed EF of 50-55 with left atrium severely dilated,      Mod-severe mitral annular calcification. Severely increased concentric systolic function. Dialyzing until closer to dry weight  - concern for hypotension given small LV size if pt becomes fluid depleted. Currently better controlled. Will start q4h vitals, pt is off tele now. Changed notificaiton order parameter to notify if SBP <120 to monitor for signs of volume depletion.  - bp low in hd yesterday, normal on floor.   - weaning bp meds as she is more euvolemic. Decreased clonidine to 0.1 tid 1/18, decreased to Bid dosing 1/19. Plan to attempt to wean off. Hold bp meds prior to HD to allow room for fluid removal.    Abd discomfort  Nausea/vomitting  constipation  - check kub- pending read, appears to have a large stool burden, no dilated bowel.   - added ppi  -reports last bm yesterday, passing gas  - increase miralax to bid and make senna/doc 1 tab bid. Pt is incrasing activity. Developed some n/v, on zofran and reglan.  - Avoiding opioids for now which could worsen consitpation  - despite bowel regimen and half a golytely bowel prep still continuing.   - repeat kub showing stool has moved thorough colon, concern for developing ileus now. Pt has no stool in rectal vault on xray, awaiting CT abdomen/pelvis.   - of note pt has history of gastroparesis as well. She is maximized on reglan per her renal function.     R  breast swelling  - with warm, edema and ttp, no erythema or drainage  - starting keflex.   - examination with nursing chaperone.   - monitor for response in next 24-48hrs. No fevers, no wbc elevation  - reports anxiety due to h/o breast cancers in family. Counselling provided.she reports she is due for screening. Plan to obtain in op setting.   - improving.     ESRD   - Dialysis T/Th/Sat   - continue HD/UF until closer to dry weight of 50kg per nephrology, currently 57kg after HD.   - may need bp dose reductions as closer to dry weight  -  hold hydralazine and clonidine the morning of Dialysis.    - Concern about dietary and fluid adherance.   - hold bp meds prior to hd    Chronic deep vein thrombosis (DVT) of brachial vein of left upper extremity   - Continue with Eliquis     Headache likely due to migraine vs Hypertensive emergency  - improved    COPD   -Continue Albuterol neb q6hr prn     Insomnia   - Melatonin and trazodone prn    Acute on chronic anemia   - in setting of esrd  - no bleeding noted  - monitor    DMII with gastroparesis  - continue reglan and zofran prn  - continue bowel regimen  - no longer on medications for dm in setting of her esrd  - pt on reglan.     Continuing goals of care, consulted palliative care. Nephrology had goc meeting with behavioral jonah and would like to involve palliative in goc.  Pt appears to have better understanding of her condition since the meeting.        Scheduled outpatient appointments in system:   Future Appointments   Date Time Provider Department Center   1/27/2025  4:00 PM Makenna Barraza MD [id number] [facility]   3/25/2025  1:20 PM Nate Hyman MD [id number] [facility]     ---------------------------------------------------------------------------------------------------  Subjective   No events. Pt elevated yesterday due to rebound. Elevated again. Pt having some discomfort and trouble tolerating diet, diffuclty moving bowel, only able to tolerate small  "amounts of solids due to nausea. No f/c.   ---------------------------------------------------------------------------------------------------  Objective   Last Recorded Vitals  Blood pressure 165/72, pulse 68, temperature 36.5 °C (97.7 °F), temperature source Temporal, resp. rate 18, height 1.397 m (4' 7\"), weight 57.6 kg (126 lb 14.4 oz), SpO2 93%.  Intake/Output last 3 Shifts:  I/O last 3 completed shifts:  In: - (0 mL/kg)   Out: 75 (1.3 mL/kg) [Emesis/NG output:75]  Weight: 57.6 kg     Physical Exam  Vitals and nursing note reviewed.   Constitutional:       General: She is not in acute distress.     Appearance: Normal appearance. She is obese.   HENT:      Head: Normocephalic and atraumatic.      Mouth/Throat:      Mouth: Mucous membranes are moist.   Eyes:      General: No scleral icterus.     Extraocular Movements: Extraocular movements intact.      Conjunctiva/sclera: Conjunctivae normal.   Cardiovascular:      Rate and Rhythm: Normal rate and regular rhythm.      Heart sounds: S1 normal and S2 normal. No murmur heard.  Pulmonary:      Effort: Pulmonary effort is normal. No respiratory distress.      Breath sounds: No wheezing, rhonchi or rales.      Comments: Diminished b/l  Abdominal:      General: Bowel sounds are normal. There is no distension.      Palpations: Abdomen is soft.      Tenderness: There is no abdominal tenderness. There is no guarding or rebound.   Musculoskeletal:         General: No swelling or deformity.      Cervical back: Neck supple.      Comments: R breast swelling, ttp, warmth noted.  no erythema or drainage   Skin:     General: Skin is warm and dry.      Findings: No rash.   Neurological:      General: No focal deficit present.      Mental Status: She is alert and oriented to person, place, and time. Mental status is at baseline.      Sensory: No sensory deficit.      Motor: No weakness.   Psychiatric:         Mood and Affect: Mood normal.         Behavior: Behavior normal. "         Relevant Results  Lab Results   Component Value Date    WBC 6.3 01/20/2025    HGB 11.2 (L) 01/20/2025    HCT 37.6 01/20/2025    MCV 98 01/20/2025     01/20/2025      Lab Results   Component Value Date    GLUCOSE 221 (H) 01/20/2025    CALCIUM 9.7 01/20/2025     01/20/2025    K 5.3 01/20/2025    CO2 26 01/20/2025    CL 92 (L) 01/20/2025    BUN 56 (H) 01/20/2025    CREATININE 8.88 (H) 01/20/2025     Scheduled medications  acetaminophen, 975 mg, oral, q8h  ammonium lactate, , Topical, BID  apixaban, 5 mg, oral, BID  aspirin, 81 mg, oral, Daily  atorvastatin, 80 mg, oral, Nightly  bisacodyl, 10 mg, rectal, Daily  carvedilol, 25 mg, oral, BID  cephalexin, 500 mg, oral, q12h BELEN  cloNIDine, 0.1 mg, oral, TID  dilTIAZem CD, 360 mg, oral, Daily  epoetin joceline or biosimilar, 10,000 Units, intravenous, Once per day on Tuesday Thursday Saturday  fluticasone, 2 spray, Each Nostril, Daily  fluticasone furoate-vilanteroL, 1 puff, inhalation, Daily  gabapentin, 300 mg, oral, Nightly  [Held by provider] hydrALAZINE, 100 mg, oral, TID  insulin lispro, 0-10 Units, subcutaneous, TID AC  lidocaine, 1 patch, transdermal, Daily  melatonin, 6 mg, oral, Daily  metoclopramide, 10 mg, oral, q8h  oxygen, , inhalation, Continuous - Inhalation  pantoprazole, 40 mg, oral, Daily before breakfast  perflutren lipid microspheres, 0.5-10 mL of dilution, intravenous, Once in imaging  polyethylene glycol, 17 g, oral, BID  sennosides-docusate sodium, 1 tablet, oral, BID  sevelamer carbonate, 800 mg, oral, TID  torsemide, 20 mg, oral, 2 times per day on Sunday Tuesday Thursday Saturday  valsartan, 320 mg, oral, q PM  vitamin B complex-vitamin C-folic acid, 1 capsule, oral, Daily  white petrolatum, , Topical, BID      Continuous medications     PRN medications  PRN medications: albuterol, diclofenac sodium, dicyclomine, diphenhydrAMINE, ipratropium-albuteroL, lidocaine, lidocaine-diphenhydraMINE-Maalox 1:1:1, ondansetron, pramoxine,  sodium chloride, SUMAtriptan, traZODone, trimethobenzamide    Soniya Ruff MD

## 2025-01-21 NOTE — PROGRESS NOTES
Transitional Care Coordination Progress Note:  Patient discussed during interdisciplinary rounds.  Team members present: MD, TCC  Plan per Medical/Surgical team: Per attending hypertensive emergency has resolved however, repeat KUB showing stool has moved thorough colon, concern for developing ileus now, awaiting CT abdomen/pelvis.   Payer: [facility], [facility] [employer]   Status: Inpatient  Discharge disposition: [facility] ([facility]) for RN/LPN (assessment/med compliance), PT/OT and SW services pending SOC.  Potential Barriers: none  ADOD: 1/23  Care coordinator will continue to follow for discharge planning needs.     Dorothy Ramos RN  Transitional Care Coordinator (TCC)  [phone] or [phone]

## 2025-01-21 NOTE — NURSING NOTE
Report to Receiving RN:    Report To: PONCHO Hernandez  Time Report Called: 1764  Hand-Off Communication: Pt completed 3 hrs 50 mins, 2.6L fluid removed, tolerated tx, /54, HR 82, stable. A/O  Complications During Treatment: No  Ultrafiltration Treatment: Yes  Medications Administered During Dialysis: Yes, zofran, tylenol, see MAR  Blood Products Administered During Dialysis: No  Labs Sent During Dialysis: No  Heparin Drip Rate Changes: No  Dialysis Catheter Dressing: N/A, AVF  Last Dressing Change: N/A    Last Updated: 1:33 PM by SAKINA MOELLER

## 2025-01-22 ENCOUNTER — DOCUMENTATION (OUTPATIENT)
Dept: CARE COORDINATION | Facility: CLINIC | Age: 55
End: 2025-01-22
Payer: COMMERCIAL

## 2025-01-22 ENCOUNTER — DOCUMENTATION (OUTPATIENT)
Dept: BEHAVIORAL HEALTH | Facility: CLINIC | Age: 55
End: 2025-01-22
Payer: COMMERCIAL

## 2025-01-22 ENCOUNTER — HOME CARE VISIT (OUTPATIENT)
Dept: HOME HEALTH SERVICES | Facility: HOME HEALTH | Age: 55
End: 2025-01-22
Payer: COMMERCIAL

## 2025-01-22 ENCOUNTER — TELEPHONE (OUTPATIENT)
Dept: TRANSPLANT | Facility: HOSPITAL | Age: 55
End: 2025-01-22
Payer: COMMERCIAL

## 2025-01-22 LAB
ALBUMIN SERPL BCP-MCNC: 3.8 G/DL (ref 3.4–5)
ANION GAP SERPL CALC-SCNC: 18 MMOL/L (ref 10–20)
BASOPHILS # BLD AUTO: 0.07 X10*3/UL (ref 0–0.1)
BASOPHILS NFR BLD AUTO: 1.1 %
BUN SERPL-MCNC: 27 MG/DL (ref 6–23)
CALCIUM SERPL-MCNC: 9.7 MG/DL (ref 8.6–10.6)
CHLORIDE SERPL-SCNC: 94 MMOL/L (ref 98–107)
CO2 SERPL-SCNC: 31 MMOL/L (ref 21–32)
CREAT SERPL-MCNC: 6.74 MG/DL (ref 0.5–1.05)
EGFRCR SERPLBLD CKD-EPI 2021: 7 ML/MIN/1.73M*2
EOSINOPHIL # BLD AUTO: 0.65 X10*3/UL (ref 0–0.7)
EOSINOPHIL NFR BLD AUTO: 10.2 %
ERYTHROCYTE [DISTWIDTH] IN BLOOD BY AUTOMATED COUNT: 14.7 % (ref 11.5–14.5)
GLUCOSE BLD MANUAL STRIP-MCNC: 186 MG/DL (ref 74–99)
GLUCOSE BLD MANUAL STRIP-MCNC: 208 MG/DL (ref 74–99)
GLUCOSE BLD MANUAL STRIP-MCNC: 90 MG/DL (ref 74–99)
GLUCOSE SERPL-MCNC: 86 MG/DL (ref 74–99)
HCT VFR BLD AUTO: 33.7 % (ref 36–46)
HGB BLD-MCNC: 10.4 G/DL (ref 12–16)
IMM GRANULOCYTES # BLD AUTO: 0.01 X10*3/UL (ref 0–0.7)
IMM GRANULOCYTES NFR BLD AUTO: 0.2 % (ref 0–0.9)
LYMPHOCYTES # BLD AUTO: 0.97 X10*3/UL (ref 1.2–4.8)
LYMPHOCYTES NFR BLD AUTO: 15.3 %
MCH RBC QN AUTO: 29.7 PG (ref 26–34)
MCHC RBC AUTO-ENTMCNC: 30.9 G/DL (ref 32–36)
MCV RBC AUTO: 96 FL (ref 80–100)
MONOCYTES # BLD AUTO: 0.78 X10*3/UL (ref 0.1–1)
MONOCYTES NFR BLD AUTO: 12.3 %
NEUTROPHILS # BLD AUTO: 3.88 X10*3/UL (ref 1.2–7.7)
NEUTROPHILS NFR BLD AUTO: 60.9 %
NRBC BLD-RTO: 0 /100 WBCS (ref 0–0)
PHOSPHATE SERPL-MCNC: 5.8 MG/DL (ref 2.5–4.9)
PLATELET # BLD AUTO: 221 X10*3/UL (ref 150–450)
POTASSIUM SERPL-SCNC: 4.7 MMOL/L (ref 3.5–5.3)
RBC # BLD AUTO: 3.5 X10*6/UL (ref 4–5.2)
SODIUM SERPL-SCNC: 138 MMOL/L (ref 136–145)
WBC # BLD AUTO: 6.4 X10*3/UL (ref 4.4–11.3)

## 2025-01-22 PROCEDURE — 2500000005 HC RX 250 GENERAL PHARMACY W/O HCPCS: Performed by: STUDENT IN AN ORGANIZED HEALTH CARE EDUCATION/TRAINING PROGRAM

## 2025-01-22 PROCEDURE — 2500000001 HC RX 250 WO HCPCS SELF ADMINISTERED DRUGS (ALT 637 FOR MEDICARE OP): Performed by: STUDENT IN AN ORGANIZED HEALTH CARE EDUCATION/TRAINING PROGRAM

## 2025-01-22 PROCEDURE — 2500000004 HC RX 250 GENERAL PHARMACY W/ HCPCS (ALT 636 FOR OP/ED): Performed by: STUDENT IN AN ORGANIZED HEALTH CARE EDUCATION/TRAINING PROGRAM

## 2025-01-22 PROCEDURE — 2500000002 HC RX 250 W HCPCS SELF ADMINISTERED DRUGS (ALT 637 FOR MEDICARE OP, ALT 636 FOR OP/ED): Performed by: STUDENT IN AN ORGANIZED HEALTH CARE EDUCATION/TRAINING PROGRAM

## 2025-01-22 PROCEDURE — 1200000002 HC GENERAL ROOM WITH TELEMETRY DAILY

## 2025-01-22 PROCEDURE — 36415 COLL VENOUS BLD VENIPUNCTURE: CPT | Performed by: STUDENT IN AN ORGANIZED HEALTH CARE EDUCATION/TRAINING PROGRAM

## 2025-01-22 PROCEDURE — 99233 SBSQ HOSP IP/OBS HIGH 50: CPT | Performed by: NURSE PRACTITIONER

## 2025-01-22 PROCEDURE — 82947 ASSAY GLUCOSE BLOOD QUANT: CPT

## 2025-01-22 PROCEDURE — G0316 PR PROLONGED HOSPITAL INPATIENT OR OBSERVATION CARE EVALUATION AND MANAGEMENT SERVICE(S) BEYOND THE TOTAL TIME FOR THE PRIMARY SERVICE (WHEN THE PRIMARY SERVICE HAS BEEN SELECTED USING TIME: HCPCS | Performed by: INTERNAL MEDICINE

## 2025-01-22 PROCEDURE — 85025 COMPLETE CBC W/AUTO DIFF WBC: CPT | Performed by: STUDENT IN AN ORGANIZED HEALTH CARE EDUCATION/TRAINING PROGRAM

## 2025-01-22 PROCEDURE — 99223 1ST HOSP IP/OBS HIGH 75: CPT | Performed by: INTERNAL MEDICINE

## 2025-01-22 PROCEDURE — 84100 ASSAY OF PHOSPHORUS: CPT | Performed by: STUDENT IN AN ORGANIZED HEALTH CARE EDUCATION/TRAINING PROGRAM

## 2025-01-22 RX ORDER — POLYETHYLENE GLYCOL 3350 17 G/17G
17 POWDER, FOR SOLUTION ORAL 4 TIMES DAILY PRN
Status: DISCONTINUED | OUTPATIENT
Start: 2025-01-22 | End: 2025-01-27 | Stop reason: HOSPADM

## 2025-01-22 RX ADMIN — Medication: at 21:04

## 2025-01-22 RX ADMIN — VALSARTAN 320 MG: 160 TABLET, FILM COATED ORAL at 21:01

## 2025-01-22 RX ADMIN — SEVELAMER CARBONATE 800 MG: 800 TABLET, FILM COATED ORAL at 18:13

## 2025-01-22 RX ADMIN — ATORVASTATIN CALCIUM 80 MG: 80 TABLET, FILM COATED ORAL at 21:02

## 2025-01-22 RX ADMIN — ASPIRIN 81 MG: 81 TABLET, COATED ORAL at 10:50

## 2025-01-22 RX ADMIN — CARVEDILOL 25 MG: 12.5 TABLET, FILM COATED ORAL at 21:01

## 2025-01-22 RX ADMIN — CEPHALEXIN 500 MG: 500 CAPSULE ORAL at 21:02

## 2025-01-22 RX ADMIN — SENNOSIDES AND DOCUSATE SODIUM 1 TABLET: 50; 8.6 TABLET ORAL at 21:01

## 2025-01-22 RX ADMIN — CLONIDINE HYDROCHLORIDE 0.1 MG: 0.1 TABLET ORAL at 10:50

## 2025-01-22 RX ADMIN — POLYETHYLENE GLYCOL 3350 17 G: 17 POWDER, FOR SOLUTION ORAL at 08:22

## 2025-01-22 RX ADMIN — PANTOPRAZOLE SODIUM 40 MG: 40 TABLET, DELAYED RELEASE ORAL at 06:43

## 2025-01-22 RX ADMIN — ACETAMINOPHEN 975 MG: 325 TABLET, FILM COATED ORAL at 02:41

## 2025-01-22 RX ADMIN — DILTIAZEM HYDROCHLORIDE 360 MG: 180 CAPSULE, COATED, EXTENDED RELEASE ORAL at 10:50

## 2025-01-22 RX ADMIN — SENNOSIDES AND DOCUSATE SODIUM 1 TABLET: 50; 8.6 TABLET ORAL at 10:50

## 2025-01-22 RX ADMIN — SEVELAMER CARBONATE 800 MG: 800 TABLET, FILM COATED ORAL at 11:15

## 2025-01-22 RX ADMIN — ACETAMINOPHEN 975 MG: 325 TABLET, FILM COATED ORAL at 18:13

## 2025-01-22 RX ADMIN — PETROLATUM: 420 OINTMENT TOPICAL at 21:04

## 2025-01-22 RX ADMIN — METOCLOPRAMIDE 10 MG: 10 TABLET ORAL at 10:49

## 2025-01-22 RX ADMIN — CLONIDINE HYDROCHLORIDE 0.1 MG: 0.1 TABLET ORAL at 18:17

## 2025-01-22 RX ADMIN — CEPHALEXIN 500 MG: 500 CAPSULE ORAL at 10:50

## 2025-01-22 RX ADMIN — GABAPENTIN 300 MG: 300 CAPSULE ORAL at 21:02

## 2025-01-22 RX ADMIN — METOCLOPRAMIDE 10 MG: 10 TABLET ORAL at 02:44

## 2025-01-22 RX ADMIN — Medication: at 10:50

## 2025-01-22 RX ADMIN — APIXABAN 5 MG: 5 TABLET, FILM COATED ORAL at 10:50

## 2025-01-22 RX ADMIN — RENO CAPS 1 CAPSULE: 100; 1.5; 1.7; 20; 10; 1; 150; 5; 6 CAPSULE ORAL at 10:50

## 2025-01-22 RX ADMIN — METOCLOPRAMIDE 10 MG: 10 TABLET ORAL at 18:13

## 2025-01-22 RX ADMIN — Medication 0.5 L/MIN: at 08:00

## 2025-01-22 RX ADMIN — PETROLATUM: 420 OINTMENT TOPICAL at 08:19

## 2025-01-22 RX ADMIN — ACETAMINOPHEN 975 MG: 325 TABLET, FILM COATED ORAL at 10:49

## 2025-01-22 RX ADMIN — FLUTICASONE FUROATE AND VILANTEROL TRIFENATATE 1 PUFF: 100; 25 POWDER RESPIRATORY (INHALATION) at 08:22

## 2025-01-22 RX ADMIN — BISACODYL 10 MG: 10 SUPPOSITORY RECTAL at 10:50

## 2025-01-22 RX ADMIN — CARVEDILOL 25 MG: 12.5 TABLET, FILM COATED ORAL at 10:50

## 2025-01-22 ASSESSMENT — PAIN - FUNCTIONAL ASSESSMENT: PAIN_FUNCTIONAL_ASSESSMENT: 0-10

## 2025-01-22 ASSESSMENT — PAIN SCALES - GENERAL
PAINLEVEL_OUTOF10: 6
PAINLEVEL_OUTOF10: 0 - NO PAIN
PAINLEVEL_OUTOF10: 0 - NO PAIN

## 2025-01-22 NOTE — PROGRESS NOTES
Stacey Heath  Age: 54 y.o.  MRN: [id number]  Date: 1/14/2025  Location of service: in community    Program Details  Medicaid Community Clinical Case Management  Status: Enrolled  Effective Dates: 10/17/2023 - present  Responsible Staff: NAREN Davidson      Goals Reviewed:  Problem: Kidney Issues       Goal: Improve health to get on kidney transplant list       Priority: High        Problem: Negative Experience, Conflict with, or Distrust of Providers and/or Health System       Goal: Plan to Address Patient Specific Negative Experience, Distrust, or Conflict with Providers and/or Health System       Priority: High        Problem: Risk of Uncoordinated Care       Goal: Care will be Coordinated and Supported by a Multidisciplinary Team of Providers       Priority: High          Summary:  This writer met with patient in the hospital.  Patient was in dialysis throughout the beginning of our appointment. This writer provides support as patient ends her dialysis session.   Patient states that a provider told her that she would possibly be discharged tomorrow after dialysis. This writer sends a message to Dr. Barraza to confirm she is aware and confirm she is comfortable with that decision.   This writer engages in relationship building. Patient discusses her children and grandchildren.    Appointment start time: 1030  Appointment completion time: 1234  Total time spent with patient (in minutes): 124  Non-Billable Time: 124  Billable Time Total: 0    Roberta Gallego RN

## 2025-01-22 NOTE — PROGRESS NOTES
Stacey Heath  Age: 54 y.o.  MRN: [id number]  Date: 1/22/2025  Location of service: in community    Program Details  Medicaid Community Clinical Case Management  Status: Enrolled  Effective Dates: 10/17/2023 - present  Responsible Staff: NAREN Davidson      Goals Reviewed:  Problem: Access to Care Issue       Goal: Assess and Address Access Barriers       Priority: Medium        Problem: Anxiety       Goal: Maintain coping skills for continuous improvement       Priority: Medium        Problem: Financial Stressors       Goal: Assistance with financial concerns         Problem: Kidney Issues       Goal: Improve health to get on kidney transplant list       Priority: High        Problem: Medication Adherence       Goal: Adherence to Medication Regimen       Priority: High        Problem: Negative Experience, Conflict with, or Distrust of Providers and/or Health System       Goal: Plan to Address Patient Specific Negative Experience, Distrust, or Conflict with Providers and/or Health System       Priority: High        Problem: Risk of Uncoordinated Care       Goal: Care will be Coordinated and Supported by a Multidisciplinary Team of Providers       Priority: High          Summary:  This provider met with patient at [facility] where patient has been admitted since December 29th, 2024. This provider listened with empathy as patient talked about meeting her new grandson. Provider inquired about patient's increased ability to walk from her hospital room over to [facility] where her daughter was after having the baby. Patient reports that she walked really well and her breathing has improved since she was able to get down to her dry weight. This provider also inquired about patient's search for a new place to live. Patient and her significant other are still looking.                      NAREN Davidson

## 2025-01-22 NOTE — PROGRESS NOTES
"Stacey Heath is a 54 y.o. female on day 24 of admission presenting with Hypertensive emergency.    Subjective   Pt sitting on side of bed with family at bedside. Denies sob, n/v/d, fever, cough, chills, pain, chest pains, light head, dizziness, feels constipation as she has not had a bm since Sunday (1/19) does not have as much abd pain as before       Objective     Physical Exam  Vitals and nursing note reviewed.   Cardiovascular:      Rate and Rhythm: Normal rate and regular rhythm.   Pulmonary:      Comments: Mckin[place] lung sounds dim  POX 95% room air  Abdominal:      General: There is distension.      Comments: Non-tender to palpation   Genitourinary:     Comments: States minimal urine  Enema today for c/o constipation  Musculoskeletal:      Comments: Ble without edema   Skin:     General: Skin is warm and dry.      Comments: rt breast swelling resolved, no pain, warmth   Neurological:      Mental Status: She is alert and oriented to person, place, and time.   Psychiatric:         Mood and Affect: Mood normal.         Behavior: Behavior normal.         Last Recorded Vitals  Blood pressure 162/67, pulse 70, temperature 36.9 °C (98.4 °F), resp. rate 17, height 1.397 m (4' 7\"), weight 61.1 kg (134 lb 11.2 oz), SpO2 95%.  Intake/Output last 3 Shifts:  I/O last 3 completed shifts:  In: 1000 (16.4 mL/kg) [I.V.:600 (9.8 mL/kg); Other:400]  Out: 3000 (49.1 mL/kg) [Other:3000]  Weight: 61.1 kg     Relevant Results  Scheduled medications  acetaminophen, 975 mg, oral, q8h  ammonium lactate, , Topical, BID  apixaban, 5 mg, oral, BID  aspirin, 81 mg, oral, Daily  atorvastatin, 80 mg, oral, Nightly  bisacodyl, 10 mg, rectal, Daily  carvedilol, 25 mg, oral, BID  cephalexin, 500 mg, oral, q12h BELEN  cloNIDine, 0.1 mg, oral, TID  dilTIAZem CD, 360 mg, oral, Daily  epoetin joceline or biosimilar, 10,000 Units, intravenous, Once per day on Tuesday Thursday Saturday  fluticasone, 2 spray, Each Nostril, Daily  fluticasone " furoate-vilanteroL, 1 puff, inhalation, Daily  gabapentin, 300 mg, oral, Nightly  [Held by provider] hydrALAZINE, 100 mg, oral, TID  insulin lispro, 0-10 Units, subcutaneous, TID AC  lidocaine, 1 patch, transdermal, Daily  melatonin, 6 mg, oral, Daily  metoclopramide, 10 mg, oral, q8h  pantoprazole, 40 mg, oral, Daily before breakfast  perflutren lipid microspheres, 0.5-10 mL of dilution, intravenous, Once in imaging  sennosides-docusate sodium, 1 tablet, oral, BID  sevelamer carbonate, 800 mg, oral, TID  valsartan, 320 mg, oral, q PM  vitamin B complex-vitamin C-folic acid, 1 capsule, oral, Daily  white petrolatum, , Topical, BID      Continuous medications     PRN medications  PRN medications: albuterol, diclofenac sodium, dicyclomine, diphenhydrAMINE, ipratropium-albuteroL, lidocaine, lidocaine-diphenhydraMINE-Maalox 1:1:1, ondansetron, polyethylene glycol, pramoxine, sodium chloride, SUMAtriptan, traZODone, trimethobenzamide   Results for orders placed or performed during the hospital encounter of 12/29/24 (from the past 24 hours)   POCT GLUCOSE   Result Value Ref Range    POCT Glucose 350 (H) 74 - 99 mg/dL   POCT GLUCOSE   Result Value Ref Range    POCT Glucose 92 74 - 99 mg/dL   POCT GLUCOSE   Result Value Ref Range    POCT Glucose 188 (H) 74 - 99 mg/dL   Renal Function Panel   Result Value Ref Range    Glucose 86 74 - 99 mg/dL    Sodium 138 136 - 145 mmol/L    Potassium 4.7 3.5 - 5.3 mmol/L    Chloride 94 (L) 98 - 107 mmol/L    Bicarbonate 31 21 - 32 mmol/L    Anion Gap 18 10 - 20 mmol/L    Urea Nitrogen 27 (H) 6 - 23 mg/dL    Creatinine 6.74 (H) 0.50 - 1.05 mg/dL    eGFR 7 (L) >60 mL/min/1.73m*2    Calcium 9.7 8.6 - 10.6 mg/dL    Phosphorus 5.8 (H) 2.5 - 4.9 mg/dL    Albumin 3.8 3.4 - 5.0 g/dL   CBC and Auto Differential   Result Value Ref Range    WBC 6.4 4.4 - 11.3 x10*3/uL    nRBC 0.0 0.0 - 0.0 /100 WBCs    RBC 3.50 (L) 4.00 - 5.20 x10*6/uL    Hemoglobin 10.4 (L) 12.0 - 16.0 g/dL    Hematocrit 33.7 (L)  36.0 - 46.0 %    MCV 96 80 - 100 fL    MCH 29.7 26.0 - 34.0 pg    MCHC 30.9 (L) 32.0 - 36.0 g/dL    RDW 14.7 (H) 11.5 - 14.5 %    Platelets 221 150 - 450 x10*3/uL    Neutrophils % 60.9 40.0 - 80.0 %    Immature Granulocytes %, Automated 0.2 0.0 - 0.9 %    Lymphocytes % 15.3 13.0 - 44.0 %    Monocytes % 12.3 2.0 - 10.0 %    Eosinophils % 10.2 0.0 - 6.0 %    Basophils % 1.1 0.0 - 2.0 %    Neutrophils Absolute 3.88 1.20 - 7.70 x10*3/uL    Immature Granulocytes Absolute, Automated 0.01 0.00 - 0.70 x10*3/uL    Lymphocytes Absolute 0.97 (L) 1.20 - 4.80 x10*3/uL    Monocytes Absolute 0.78 0.10 - 1.00 x10*3/uL    Eosinophils Absolute 0.65 0.00 - 0.70 x10*3/uL    Basophils Absolute 0.07 0.00 - 0.10 x10*3/uL   POCT GLUCOSE   Result Value Ref Range    POCT Glucose 90 74 - 99 mg/dL   POCT GLUCOSE   Result Value Ref Range    POCT Glucose 186 (H) 74 - 99 mg/dL     *Note: Due to a large number of results and/or encounters for the requested time period, some results have not been displayed. A complete set of results can be found in Results Review.                       Assessment/Plan   Assessment & Plan  Flash pulmonary edema    ESRD (end stage renal disease) on dialysis (Multi)    Hypertensive emergency    Did not Tolerate hemodialysis yesterday with net fluid loss 2.6L.     Bp 216/86 at start of tx down trending to 99/52 at which the uf was off, pt felt sick, bp at 104/56 at term of tx, euvolemic on exam and has stable electrolytes . K+=4.7     Outpatient Dialysis schedule: TTS [facility]/Dr Barraza       Access: rt upper arm fist- no issues - able to achieve      Anemia of ESRD: epoetin joceline-epbx (Retacrit) injection 10,000 Units  .. Current hgb 10.4.. will cont to monitor.. pt to receive epogen with dialysis tx's as pt does not have IV access       CKD-MBD Phosphate Binder:sevelamer carbonate (Renvela) tablet 800 mg tid ac, vitamin B complex-vitamin C-folic acid (Nephrocaps) capsule 1 capsule daily     Plan HD tomorrow  with UF as tolerated     Renal diet      Please obtain daily standing wt (if possible)     Medication to be adjusted for ESRD      Patient to continue regular HD schedule while inpatient and to follow with the outpatient nephrologist at discharge          I spent 50 minutes in the professional and overall care of this patient.      Arleen Reed, LILLY-CNP

## 2025-01-22 NOTE — NURSING NOTE
Late Entry for 1/16/2025    Pt lost IV access. Noted when tried to flush IV during assessment about 1730. VAST team contacted for new IV and were unable to place. Noted that if pt needed access would require midline or PICC. MD notified and informed that only med ordered IV during Rns shift was Recrit/epogen.

## 2025-01-22 NOTE — CONSULTS
"Nutrition Follow Up Assessment:   Nutrition Assessment    Reason for Assessment:  (Follow up)    Patient is a 54 y.o. female presenting with acute onset shortness of breath.     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 Pt has no stool in rectal vault on xray, awaiting CT abdomen/pelvis.   - of note pt has history of gastroparesis as well. She is maximized on reglan per her renal function.      Nutrition History:  Food and Nutrient History: Patient was sitting up at the end of the bed upon visit with visitors from the [facility] and her long time  at bedside.  Patient described some improvement as far as tolerance to her liquid diet.  Reports she had 2 Ensures yesterday and some cream of wheat. Prefers Ensure shakes over Nepro shakes and plans to take them home with her at discharge. Jokes she would like a steak if I have one.  Denies difficulty chewing or swallowing.  Inquired about finding from test yesterday in regards to her stomach which I deferred to her provider.  Patient reports she had diarrhea over the summer that may have been a blockage per her doctor so they are concerned for that now as she describes herself as being constipated.  Food Allergy: Shellfish, Other (Comment) (citrus, bioflavinoids)       Anthropometrics:  Height: 139.7 cm (4' 7\")   Weight: 61.1 kg (134 lb 11.2 oz)   BMI (Calculated): 31.31  IBW/kg (Dietitian Calculated): 40 kg  Percent of IBW: 154 %       Weight History:   Wt Readings from Last 21 Encounters:   01/22/25 61.1 kg (134 lb 11.2 oz)   12/20/24 54.9 kg (121 lb)   12/11/24 56.6 kg (124 lb 12.5 oz)   12/01/24 52.6 kg (116 lb)   11/26/24 52.6 kg (116 lb)   11/25/24 52.6 kg (116 lb)   11/24/24 52.2 kg (115 lb)   11/19/24 53.5 kg (117 lb 15.1 oz)   11/08/24 55.4 kg (122 lb 1.6 " oz)   11/06/24 47.2 kg (104 lb)   11/01/24 53.1 kg (117 lb)   10/24/24 57.6 kg (126 lb 15.8 oz)   10/07/24 52.6 kg (116 lb)   10/01/24 52.6 kg (116 lb)   09/29/24 52.6 kg (116 lb)   09/24/24 52.6 kg (116 lb)   09/20/24 52.2 kg (115 lb 1.3 oz)   08/29/24 53.5 kg (118 lb)   08/28/24 53.5 kg (118 lb)   08/23/24 51.3 kg (113 lb 1.5 oz)   08/12/24 54 kg (119 lb 0.8 oz)       Weight Change %:  Weight History / % Weight Change: Noted dry weight is 53kg and noted trying to get to 50kg.  Last HD 1/21/25.    Nutrition Focused Physical Exam Findings:  Overall adequate stores   Subcutaneous Fat Loss:      Muscle Wasting:     Edema:     Physical Findings:       Nutrition Significant Labs:  CBC Trend:   Results from last 7 days   Lab Units 01/22/25  0820 01/21/25  0733 01/20/25  1246 01/19/25  0808   WBC AUTO x10*3/uL 6.4 6.6 6.3 9.4   RBC AUTO x10*6/uL 3.50* 3.66* 3.85* 3.94*   HEMOGLOBIN g/dL 10.4* 10.6* 11.2* 11.4*   HEMATOCRIT % 33.7* 34.5* 37.6 38.2   MCV fL 96 94 98 97   PLATELETS AUTO x10*3/uL 221 259 249 314    , Renal Lab Trend:   Results from last 7 days   Lab Units 01/22/25  0820 01/21/25  0733 01/20/25  1246 01/19/25  0808   POTASSIUM mmol/L 4.7 5.2 5.3 4.8   PHOSPHORUS mg/dL 5.8* 6.6* 5.6* 5.8*   SODIUM mmol/L 138 138 138 134*   EGFR mL/min/1.73m*2 7* 4* 5* 8*   BUN mg/dL 27* 65* 56* 38*   CREATININE mg/dL 6.74* 10.38* 8.88* 6.22*    , Vit D:   Lab Results   Component Value Date    VITD25 9 (L) 12/30/2024    , Vit B12:   Lab Results   Component Value Date    [id number] 312 01/16/2024    , Iron Panel:   Lab Results   Component Value Date    IRON 35 01/15/2024    TIBC 201 (L) 01/15/2024    FERRITIN 1,044 (H) 01/15/2024    , Folate:   Lab Results   Component Value Date    FOLATE 22.4 01/16/2024        Nutrition Specific Medications:  Scheduled medications  acetaminophen, 975 mg, oral, q8h  ammonium lactate, , Topical, BID  apixaban, 5 mg, oral, BID  aspirin, 81 mg, oral, Daily  atorvastatin, 80 mg, oral,  Nightly  bisacodyl, 10 mg, rectal, Daily  carvedilol, 25 mg, oral, BID  cephalexin, 500 mg, oral, q12h BELEN  cloNIDine, 0.1 mg, oral, TID  dilTIAZem CD, 360 mg, oral, Daily  epoetin joceline or biosimilar, 10,000 Units, intravenous, Once per day on Tuesday Thursday Saturday  fluticasone, 2 spray, Each Nostril, Daily  fluticasone furoate-vilanteroL, 1 puff, inhalation, Daily  gabapentin, 300 mg, oral, Nightly  [Held by provider] hydrALAZINE, 100 mg, oral, TID  insulin lispro, 0-10 Units, subcutaneous, TID AC  lidocaine, 1 patch, transdermal, Daily  melatonin, 6 mg, oral, Daily  metoclopramide, 10 mg, oral, q8h  pantoprazole, 40 mg, oral, Daily before breakfast  perflutren lipid microspheres, 0.5-10 mL of dilution, intravenous, Once in imaging  polyethylene glycol, 17 g, oral, BID  sennosides-docusate sodium, 1 tablet, oral, BID  sevelamer carbonate, 800 mg, oral, TID  valsartan, 320 mg, oral, q PM  vitamin B complex-vitamin C-folic acid, 1 capsule, oral, Daily  white petrolatum, , Topical, BID      I/O:   Last BM Date: 01/19/25; Stool Appearance: Unable to assess (01/22/25 0745)    Dietary Orders (From admission, onward)       Start     Ordered    01/20/25 1638  Adult diet Full Liquid  Diet effective now        Question:  Diet type  Answer:  Full Liquid    01/20/25 1637    01/03/25 1119  Oral nutritional supplements  Until discontinued        Comments: Strawberry or chocolate only   Question Answer Comment   Deliver with Breakfast    Deliver with Dinner    Select supplement: Ensure Plus        01/03/25 1118    12/29/24 2137  May Not Participate in Room Service  ( ROOM SERVICE MAY NOT PARTICIPATE)  Once        Question:  .  Answer:  Yes    12/29/24 2136                     Estimated Needs:   Total Energy Estimated Needs in 24 hours (kCal): 1400 kCal  Method for Estimating Needs: 28/kg/dry wt  Total Protein Estimated Needs in 24 Hours (g): 60 g  Method for Estimating 24 Hour Protein Needs: 1.2g/kg/dry wy              Nutrition Diagnosis   Malnutrition Diagnosis  Patient has Malnutrition Diagnosis: No    Nutrition Diagnosis  Patient has Nutrition Diagnosis: Yes  Diagnosis Status (1): Resolved  Nutrition Diagnosis 1: Inadequate oral intake  Related to (1): history of ESRD on HD  As Evidenced by (1):  (1/22/25 Update: Decline in intake over past few days r/t altered GI function)  Additional Nutrition Diagnosis: Diagnosis 2  Diagnosis Status (2): Active  Nutrition Diagnosis 2: Increased nutrient needs  Related to (2): increased metabolic demand  As Evidenced by (2): ESRD on HD       Nutrition Interventions/Recommendations   Nutrition prescription for oral nutrition    Nutrition Recommendations:  1) Advance diet as tolerated/indicated to Renal.     2) Continue daily renal vitamin, epo and phos binders per renal service, add vitamin D per renal service.    Nutrition Interventions/Goals:   Interventions: Medical food supplement  Goal: Ensure plus 1-2 x day as needed      Education Documentation  No documentation found.        Encouraged intake as tolerated with goal to work towards overall adequate intake and variety.  Patient agreeable to plan.     Nutrition Monitoring and Evaluation   Food/Nutrient Related History Monitoring  Monitoring and Evaluation Plan: Intake / amount of food  Estimated Energy Intake: Energy intake 50 -75% of estimated energy needs         Biochemical Data, Medical Tests and Procedures  Monitoring and Evaluation Plan: Electrolyte/renal panel, Vitamin profile  Criteria: WNL         Goal Status: Some digression away from goal(s)    Time Spent (min): 60 minutes

## 2025-01-22 NOTE — NURSING NOTE
3-11 RN noted pt still without IV access (since previously cared for pt 1/16). Pt still had order for Recrit/epogen IV. Pt was receiving HD daily previous week. Resumed TRS this week. Last HD 1/21. Pharm and MD notified that pt  with order for floor RN to administer Recrit/epogen IV but without IV access.

## 2025-01-22 NOTE — CONSULTS
Consults    Palliative Medicine Consult  Complex medical decision making, symptom management, patient/family support    History obtained from chart review including ED note, H&P, patient's daily progress notes, review of lab/test results, and discussion with primary team and bedside RN.    Subjective    History of Present Illness  Stacey Heath is a 54 y.o. 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 She was admitted to the MICU on 12/29/24 and found to be significantly above her dry weight. She was treated with cardene gtt, UF, and HD. A rapid response was called on 1/5 due to symptomatic HTN with /82 and she was transferred to the CICU. She was started on clevidipine gtt and uptitration of oral anti-hypertensives, along with HD/UF. She was eventually stabilized for transfer to floor on 1/9. Her hospital course has been complicated by continued volume overload as demonstrated by weights that are consistently above her dry weight despite volume removal. Her nephrologist, Dr. Barraza, is concerned that her  HFpEF, LVH, and diminished LV cavity size makes sensitive to fluctuations in volume or salt intake. Furthermore, her repeated admissions for volume overload makes it difficult to progress with renal transplant evaluation. Palliative was engaged to discuss GOC.    Introduction to Palliative Medicine  Met with the patient at bedside.   Patient alert and oriented, has capacity to make their own medical decisions at this time.   She has a spouse, Hai Pinedo, but would like her daughter, Rohini Piña (HCPOA), to be her surrogate decision maker.  Staff present: Isaiah Monteiro MD (Palliative attending), PORFIRIO Parker (Palliative Social Work), and Az Fernandez MD (HF Fellow)  Palliative Medicine was introduced as a specialty service for patients with serious illness to  "help with symptom management, improve quality of life, assist with goals of care conversations, navigate complex decision making, and provide support to patients and families. Support and empathy was provided throughout the encounter. Provided reflective listening and presence.     Symptoms  Pain: abdominal pain  Dyspnea: None  Fatigue: None  Insomnia: None  Drowsiness: None  Constipation: None  Nausea: Present, but improving with PRNs  Appetite: Okay  Anxiety: None  Depression: None    Palliative Medicine Social History:  The patient is  to Hai Pinedo for 10 years. She has 4 children from a previous partner, but Hai serves as the father for her children. The patient lives with her , 1 grandchild, and nephew. The patient used to work as a  in a hospital and for a hotel, but has been on disability for several years. She enjoys talking to people and misses that aspect of working. Pt denies use of alcohol, tobacco or drugs. The patient spends most of the day at home spending time with her family and playing with her grandchildren. Pt has been unable to see her PCP and specialists frequently due to recurrent hospitalizations.    Objective    Last Recorded Vitals  /67   Pulse 70   Temp 36.9 °C (98.4 °F)   Resp 17   Ht 1.397 m (4' 7\")   Wt 61.1 kg (134 lb 11.2 oz)   SpO2 95%   BMI 31.31 kg/m²      Physical Exam  Constitutional:       General: She is not in acute distress.     Appearance: She is not ill-appearing or toxic-appearing.   HENT:      Head: Normocephalic and atraumatic.   Cardiovascular:      Rate and Rhythm: Normal rate and regular rhythm.      Pulses: Normal pulses.      Heart sounds: Normal heart sounds. No murmur heard.     No friction rub. No gallop.   Pulmonary:      Effort: Pulmonary effort is normal. No respiratory distress.      Breath sounds: Normal breath sounds. No stridor. No wheezing.   Abdominal:      General: Abdomen is flat. There is no distension.      " Palpations: Abdomen is soft.      Tenderness: There is no abdominal tenderness. There is no guarding or rebound.   Musculoskeletal:      Right lower leg: No edema.      Left lower leg: No edema.   Neurological:      General: No focal deficit present.      Mental Status: She is alert and oriented to person, place, and time.   Psychiatric:         Mood and Affect: Mood normal.         Behavior: Behavior normal.          Relevant Results  Results for orders placed or performed during the hospital encounter of 12/29/24 (from the past 24 hours)   POCT GLUCOSE   Result Value Ref Range    POCT Glucose 98 74 - 99 mg/dL   POCT GLUCOSE   Result Value Ref Range    POCT Glucose 350 (H) 74 - 99 mg/dL   POCT GLUCOSE   Result Value Ref Range    POCT Glucose 92 74 - 99 mg/dL   POCT GLUCOSE   Result Value Ref Range    POCT Glucose 188 (H) 74 - 99 mg/dL   Renal Function Panel   Result Value Ref Range    Glucose 86 74 - 99 mg/dL    Sodium 138 136 - 145 mmol/L    Potassium 4.7 3.5 - 5.3 mmol/L    Chloride 94 (L) 98 - 107 mmol/L    Bicarbonate 31 21 - 32 mmol/L    Anion Gap 18 10 - 20 mmol/L    Urea Nitrogen 27 (H) 6 - 23 mg/dL    Creatinine 6.74 (H) 0.50 - 1.05 mg/dL    eGFR 7 (L) >60 mL/min/1.73m*2    Calcium 9.7 8.6 - 10.6 mg/dL    Phosphorus 5.8 (H) 2.5 - 4.9 mg/dL    Albumin 3.8 3.4 - 5.0 g/dL   CBC and Auto Differential   Result Value Ref Range    WBC 6.4 4.4 - 11.3 x10*3/uL    nRBC 0.0 0.0 - 0.0 /100 WBCs    RBC 3.50 (L) 4.00 - 5.20 x10*6/uL    Hemoglobin 10.4 (L) 12.0 - 16.0 g/dL    Hematocrit 33.7 (L) 36.0 - 46.0 %    MCV 96 80 - 100 fL    MCH 29.7 26.0 - 34.0 pg    MCHC 30.9 (L) 32.0 - 36.0 g/dL    RDW 14.7 (H) 11.5 - 14.5 %    Platelets 221 150 - 450 x10*3/uL    Neutrophils % 60.9 40.0 - 80.0 %    Immature Granulocytes %, Automated 0.2 0.0 - 0.9 %    Lymphocytes % 15.3 13.0 - 44.0 %    Monocytes % 12.3 2.0 - 10.0 %    Eosinophils % 10.2 0.0 - 6.0 %    Basophils % 1.1 0.0 - 2.0 %    Neutrophils Absolute 3.88 1.20 - 7.70  x10*3/uL    Immature Granulocytes Absolute, Automated 0.01 0.00 - 0.70 x10*3/uL    Lymphocytes Absolute 0.97 (L) 1.20 - 4.80 x10*3/uL    Monocytes Absolute 0.78 0.10 - 1.00 x10*3/uL    Eosinophils Absolute 0.65 0.00 - 0.70 x10*3/uL    Basophils Absolute 0.07 0.00 - 0.10 x10*3/uL   POCT GLUCOSE   Result Value Ref Range    POCT Glucose 90 74 - 99 mg/dL     *Note: Due to a large number of results and/or encounters for the requested time period, some results have not been displayed. A complete set of results can be found in Results Review.      US right upper quadrant  Narrative: STUDY:  US RIGHT UPPER QUADRANT;  1/21/2025 5:21 pm      INDICATION:  Signs/Symptoms:pancreatitis, rule out gallstones or other obstruction.          COMPARISON:  CT 01/20/2025      ACCESSION NUMBER(S):  [id number]      ORDERING CLINICIAN:  PATTI COPE      TECHNIQUE:  Multiple images of the right upper quadrant were obtained.  This  examination was interpreted at [facility].      FINDINGS:  LIVER:  The liver measures 14.8 cm and is grossly unremarkable and free of  any focal lesions.      GALLBLADDER:  Surgically absent.      BILE DUCTS:  No evidence of intra or extrahepatic biliary dilatation is  identified; the common bile duct measures 0.3 cm.      PANCREAS:  Edema is seen surrounding the pancreas.      RIGHT KIDNEY:  The right kidney measures 7.1 cm in length. There is increased renal  cortical echogenicity. No hydronephrosis or renal calculi are seen.  Twinkle artifact likely representing vascular calcifications.      Impression: 1. Edematous pancreas compatible with pancreatitis similar to the  prior CT dated 01/20/2025.  2. Status post cholecystectomy.  3. Increased right cortical echogenicity compatible with medical  renal disease.      I personally reviewed the image(s) / study and I agree with the  findings as stated by Daisy Daniels MD. This study was interpreted at  [facility]  [place], [place].      MACRO:  None          Dictation workstation:   [id number]     Encounter Date: 12/29/24   Electrocardiogram, 12-lead PRN ACS symptoms   Result Value    Ventricular Rate 90    Atrial Rate 90    NJ Interval 164    QRS Duration 92    QT Interval 364    QTC Calculation(Bazett) 445    P Axis 55    R Axis 24    T Axis 186    QRS Count 15    Q Onset 217    P Onset 135    P Offset 185    T Offset 399    QTC Fredericia 416    Narrative    Normal sinus rhythm  Biatrial enlargement  Marked ST abnormality, possible lateral subendocardial injury  Abnormal ECG  When compared with ECG of 05-JAN-2025 10:14,  No significant change was found  Confirmed by Papa Jesus ([id number]) on 1/17/2025 8:55:36 AM        Allergies  Iodine, Bee pollen, Bioflavonoids, Citrus and derivatives, Codeine, Flowers, Gadolinium-containing contrast media, and Shellfish containing products    Scheduled medications  acetaminophen, 975 mg, oral, q8h  ammonium lactate, , Topical, BID  apixaban, 5 mg, oral, BID  aspirin, 81 mg, oral, Daily  atorvastatin, 80 mg, oral, Nightly  bisacodyl, 10 mg, rectal, Daily  carvedilol, 25 mg, oral, BID  cephalexin, 500 mg, oral, q12h BELEN  cloNIDine, 0.1 mg, oral, TID  dilTIAZem CD, 360 mg, oral, Daily  epoetin joceline or biosimilar, 10,000 Units, intravenous, Once per day on Tuesday Thursday Saturday  fluticasone, 2 spray, Each Nostril, Daily  fluticasone furoate-vilanteroL, 1 puff, inhalation, Daily  gabapentin, 300 mg, oral, Nightly  [Held by provider] hydrALAZINE, 100 mg, oral, TID  insulin lispro, 0-10 Units, subcutaneous, TID AC  lidocaine, 1 patch, transdermal, Daily  melatonin, 6 mg, oral, Daily  metoclopramide, 10 mg, oral, q8h  pantoprazole, 40 mg, oral, Daily before breakfast  perflutren lipid microspheres, 0.5-10 mL of dilution, intravenous, Once in imaging  polyethylene glycol, 17 g, oral, BID  sennosides-docusate sodium, 1 tablet, oral, BID  sevelamer carbonate, 800 mg, oral, TID  [Held by  "provider] torsemide, 20 mg, oral, 2 times per day on Sunday Tuesday Thursday Saturday  valsartan, 320 mg, oral, q PM  vitamin B complex-vitamin C-folic acid, 1 capsule, oral, Daily  white petrolatum, , Topical, BID      Continuous medications     PRN medications  PRN medications: albuterol, diclofenac sodium, dicyclomine, diphenhydrAMINE, ipratropium-albuteroL, lidocaine, lidocaine-diphenhydraMINE-Maalox 1:1:1, ondansetron, pramoxine, sodium chloride, SUMAtriptan, traZODone, trimethobenzamide     Assessment/Plan    Stacey Heath is a 54 y.o. 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 She was treated with HD, UF, and cardene gtt and has been transferred to the floor as of 1/9. Her hospital course has demonstrated persistent volume overload despite HD/UF and she has a history of frequent admissions for volume overload, which is complicating her transplant candidacy. Palliative consulted to assist in GOC.    Palliative Performance Scale (PPS): 70    ----------------------------------------------------------------------------------------------------------------------------------------------------------------------------------------------------------------------------------------------------------------------------------------------------------------------------------------------------------------------  Advanced Care Planning  Patient consented to a voluntary Advanced Care Planning meeting.     Serious Illness Assessment and Counseling: We discussed at length that her fluid intake and diet can lead to recurrent episodes of volume overload and electrolyte abnormalities. She demonstrated good insight into this and added that her \"bad heart\" can exacerbate these symptoms and make her sensitive to increased fluid intake. She seemed highly motivated to make any necessary lifestyle changes, " including tracking her fluid intake and following a renal diet plan.     Life Limiting Disease: ESRD and chronic uncontrolled HTN posing threat to life or function.     Disease Specific Information Provided/Prognosis Discussed: Patient's current clinical condition, including diagnosis, prognosis, and management plan were discussed.   Counseling provided on ESRD and HFpEF leading to recurrent volume overload and hospitalizations.  Counseling provided on guarded prognosis and what to expect with disease progression of ESRD, as lack of sustained clinical stability prohibits evaluation for renal transplant.    Understanding/Overall Impression: Patient expressing clear understanding of overall health status and severity of illness.     Goals/Hopes: Discussion ensued about patient's goals for their medical care going forward. Allowed patient time to talk about his/her current quality of life, disease course/progression, and symptom and treatment burden. Discussed care plan to continue with aggressive hospital care despite symptom and treatment burden versus choosing to transition to comfort based plan of care that focuses on symptom management and quality of life.    Her goal is to maximize her time and QOL so that she can continue spend time with her grandchildren and . Her goal is to make the lifestyle changes necessary to keep her outside of the hospital in the hopes that she will be considered for transplant.    Fears/Worries/Concerns:   She shared that her father passed away from a stroke at age 56 and she worries about her own mortality as she approaches that age. She states that she does not want to die at or before age 57 like her father did.    Patient's Perception of Functional Status:   She understands that currently she is limited due to frequent hospitalizations.    Minimal Acceptable Quality of Life/Maximal [place] Tolerable for the Possibility of More Time: Counseling provided on the concept of  MAO/Maximal [place]. Patient expressing that they would never want to be in a health state where they were bedbound, unable to care for themselves, reliant on tube feeds, or requiring a tracheostomy. Patient deems that this would not be an acceptable quality of life for the patient.     Resuscitation Assessment: Counseling provided on the benefit versus burden of CPR in the setting of patient's overall health status and frailty. She shared that she believes that performing CPR would provide more harm than benefit given her overall frailty. In addition, she would not like to be intubated.    Advanced Directives:  Counseling provided on the importance of not crisis planning as disease burden progresses but to establish treatment limitations now so in the future medical team will be clear on what patient feels is an acceptable quality of life for the patient and what treatment limitations' patient would like set into place based on that.       Next of Kin: Hai Pinedo  Surrogate Health Care Decision Maker: Rohini Piña (Daughter, HCPOA)  HPOA: Completed as of 1/20/25  Living will: None.  Patient does not have a HPOA or living will. Counseling provided on benefit of completing advanced directives in order to establish person to make one's medical decision if patient were unable to speak for themselves and to make their health care wishes and treatment limitations to both hospital staff and their designated HPOA. Social work to help patient complete prior to discharge.     Code Status: Decision to keep code status DNR-DNI at this time.     I spent 30 minutes in providing separately identifiable ACP services with the patient and/or surrogate decision maker in a voluntary conversation discussing the patient's wishes and goals as detailed in the above note.    ----------------------------------------------------------------------------------------------------------------------------------------------------------------------------------------------------------------------------------------------------------------------------------------------------------------------------------------------------------------------    #Complex Medical Decision Making  #Goals of Care  #Advanced Care Planning  - Code status: DNR-DNI  - Surrogate decision maker: Rohini Aguirre (Daughter, HCPOA)  - Goals are mix of survival and time and improved quality of life  - [facility]  - Plan for patient to complete living will with social work prior to discharge   - We will continue to follow to encourage the patient to set goals and expectations towards lifestyle modifications that can improve her QOL and overall health status.    #Psychosocial Support  - Music Therapy: not assessed  - Spiritual Care Support: not assessed  - Art Therapy: not assessed  - Pet Therapy: not assessed    Plan of Care discussed with: Updated MD Soniya Ruff, Makenna Barraza MD, and bedside RN on goals of care decision, medication adjustments, and code status     Thank you for allowing us to participate in the care of this patient. Palliative will continue to follow as needed. Palliative medicine is available Monday-Friday, 8a-6p. Please contact team with any questions or concerns.  Team pager [phone] (weekdays)  Az Fernandez MD   Heart Failure Fellow  PGY-4

## 2025-01-22 NOTE — CARE PLAN
The patient's goals for the shift include      The clinical goals for the shift include remain free from pain    Pt continued to have abdominal pain this shift, tolerated popsicles but not cream of wheat, given reglan, bentyl, tylenol and hot packs for abdominal pain. Pt remains without IV access. Team aware. Only IV med 3-11 shift Recrit. Pharm and MD notified that Recrit order still IV for floor RN to give.

## 2025-01-22 NOTE — CARE PLAN
The patient's goals for the shift include  going home    The clinical goals for the shift include patient will tolerate ambulating mackey without assistance    Problem: Discharge Planning  Goal: Discharge to home or other facility with appropriate resources  1/22/2025 0839 by Cecilia Ryder RN  Outcome: Progressing  1/22/2025 0746 by Cecilia Ryder RN  Outcome: Progressing     Problem: Diabetes  Goal: Achieve decreasing blood glucose levels by end of shift  1/22/2025 0839 by Cecilia Ryder RN  Outcome: Progressing  1/22/2025 0746 by Cecilia Ryder RN  Outcome: Progressing  Goal: Increase stability of blood glucose readings by end of shift  1/22/2025 0839 by Cecilia Ryder RN  Outcome: Progressing  1/22/2025 0746 by Cecilia Ryder RN  Outcome: Progressing  Goal: Maintain electrolyte levels within acceptable range throughout shift  1/22/2025 0839 by Cecilia Ryder RN  Outcome: Progressing  1/22/2025 0746 by Cecilia Ryder RN  Outcome: Progressing  Goal: Maintain glucose levels >70mg/dl to <250mg/dl throughout shift  1/22/2025 0839 by Cecilia Ryder RN  Outcome: Progressing  1/22/2025 0746 by Cecilia Ryder RN  Outcome: Progressing  Goal: Vital signs within normal range for age by end of shift  1/22/2025 0839 by Cecilia Ryder RN  Outcome: Progressing  1/22/2025 0746 by Cecilia Ryder RN  Outcome: Progressing

## 2025-01-22 NOTE — PROGRESS NOTES
"Stacey Heath  Age: 54 y.o.  MRN: [id number]  Date: 1/15/2025  Location of service: in community    Program Details  Medicaid Community Clinical Case Management  Status: Enrolled  Effective Dates: 10/17/2023 - present  Responsible Staff: NAREN Davidson      Goals Reviewed:  Problem: Kidney Issues       Goal: Improve health to get on kidney transplant list       Priority: High        Problem: Negative Experience, Conflict with, or Distrust of Providers and/or Health System       Goal: Plan to Address Patient Specific Negative Experience, Distrust, or Conflict with Providers and/or Health System       Priority: High        Problem: Risk of Uncoordinated Care       Goal: Care will be Coordinated and Supported by a Multidisciplinary Team of Providers       Priority: High          Summary:  This writer met with patient in the hospital for a community visit.  This writer discussed having a family meeting with our team and Dr. Barraza. Patient is agreeable but is fearful that she will be \"ganged up on,\" this writer provides therapeutic communication and empathetic listening. Patient states she believes her fiancee will be available after work tomorrow around 6pm. This writer checks with Dr. Barraza to see if this time works for her. We agree to meet at that time.    Appointment start time: 1200  Appointment completion time: 1254  Total time spent with patient (in minutes): 54  Non-Billable Time: 54  Billable Time Total: 0    Roberta Gallego RN    "

## 2025-01-23 ENCOUNTER — APPOINTMENT (OUTPATIENT)
Dept: DIALYSIS | Facility: HOSPITAL | Age: 55
End: 2025-01-23
Payer: COMMERCIAL

## 2025-01-23 LAB
ALBUMIN SERPL BCP-MCNC: 3.7 G/DL (ref 3.4–5)
ANION GAP SERPL CALC-SCNC: 17 MMOL/L (ref 10–20)
BUN SERPL-MCNC: 44 MG/DL (ref 6–23)
CALCIUM SERPL-MCNC: 9 MG/DL (ref 8.6–10.6)
CHLORIDE SERPL-SCNC: 97 MMOL/L (ref 98–107)
CO2 SERPL-SCNC: 30 MMOL/L (ref 21–32)
CREAT SERPL-MCNC: 9.11 MG/DL (ref 0.5–1.05)
EGFRCR SERPLBLD CKD-EPI 2021: 5 ML/MIN/1.73M*2
ERYTHROCYTE [DISTWIDTH] IN BLOOD BY AUTOMATED COUNT: 14.8 % (ref 11.5–14.5)
GLUCOSE BLD MANUAL STRIP-MCNC: 103 MG/DL (ref 74–99)
GLUCOSE BLD MANUAL STRIP-MCNC: 123 MG/DL (ref 74–99)
GLUCOSE BLD MANUAL STRIP-MCNC: 199 MG/DL (ref 74–99)
GLUCOSE SERPL-MCNC: 112 MG/DL (ref 74–99)
HCT VFR BLD AUTO: 30 % (ref 36–46)
HGB BLD-MCNC: 9.1 G/DL (ref 12–16)
MCH RBC QN AUTO: 29 PG (ref 26–34)
MCHC RBC AUTO-ENTMCNC: 30.3 G/DL (ref 32–36)
MCV RBC AUTO: 96 FL (ref 80–100)
NRBC BLD-RTO: 0 /100 WBCS (ref 0–0)
PHOSPHATE SERPL-MCNC: 6.3 MG/DL (ref 2.5–4.9)
PLATELET # BLD AUTO: 186 X10*3/UL (ref 150–450)
POTASSIUM SERPL-SCNC: 5.3 MMOL/L (ref 3.5–5.3)
RBC # BLD AUTO: 3.14 X10*6/UL (ref 4–5.2)
SODIUM SERPL-SCNC: 139 MMOL/L (ref 136–145)
WBC # BLD AUTO: 5.2 X10*3/UL (ref 4.4–11.3)

## 2025-01-23 PROCEDURE — 2500000004 HC RX 250 GENERAL PHARMACY W/ HCPCS (ALT 636 FOR OP/ED): Performed by: STUDENT IN AN ORGANIZED HEALTH CARE EDUCATION/TRAINING PROGRAM

## 2025-01-23 PROCEDURE — 2500000001 HC RX 250 WO HCPCS SELF ADMINISTERED DRUGS (ALT 637 FOR MEDICARE OP): Performed by: STUDENT IN AN ORGANIZED HEALTH CARE EDUCATION/TRAINING PROGRAM

## 2025-01-23 PROCEDURE — 36600 WITHDRAWAL OF ARTERIAL BLOOD: CPT | Performed by: STUDENT IN AN ORGANIZED HEALTH CARE EDUCATION/TRAINING PROGRAM

## 2025-01-23 PROCEDURE — 2500000005 HC RX 250 GENERAL PHARMACY W/O HCPCS: Performed by: STUDENT IN AN ORGANIZED HEALTH CARE EDUCATION/TRAINING PROGRAM

## 2025-01-23 PROCEDURE — 99233 SBSQ HOSP IP/OBS HIGH 50: CPT | Performed by: STUDENT IN AN ORGANIZED HEALTH CARE EDUCATION/TRAINING PROGRAM

## 2025-01-23 PROCEDURE — 2500000002 HC RX 250 W HCPCS SELF ADMINISTERED DRUGS (ALT 637 FOR MEDICARE OP, ALT 636 FOR OP/ED): Performed by: STUDENT IN AN ORGANIZED HEALTH CARE EDUCATION/TRAINING PROGRAM

## 2025-01-23 PROCEDURE — 90935 HEMODIALYSIS ONE EVALUATION: CPT | Performed by: INTERNAL MEDICINE

## 2025-01-23 PROCEDURE — 80069 RENAL FUNCTION PANEL: CPT | Performed by: STUDENT IN AN ORGANIZED HEALTH CARE EDUCATION/TRAINING PROGRAM

## 2025-01-23 PROCEDURE — 85027 COMPLETE CBC AUTOMATED: CPT | Performed by: STUDENT IN AN ORGANIZED HEALTH CARE EDUCATION/TRAINING PROGRAM

## 2025-01-23 PROCEDURE — 82947 ASSAY GLUCOSE BLOOD QUANT: CPT

## 2025-01-23 PROCEDURE — 6350000001 HC RX 635 EPOETIN >10,000 UNITS: Mod: JZ,TB

## 2025-01-23 PROCEDURE — 1200000002 HC GENERAL ROOM WITH TELEMETRY DAILY

## 2025-01-23 PROCEDURE — 8010000001 HC DIALYSIS - HEMODIALYSIS PER DAY

## 2025-01-23 RX ORDER — DIPHENHYDRAMINE HYDROCHLORIDE 50 MG/ML
50 INJECTION INTRAMUSCULAR; INTRAVENOUS ONCE
Status: DISCONTINUED | OUTPATIENT
Start: 2025-01-24 | End: 2025-01-24

## 2025-01-23 RX ORDER — PREDNISONE 20 MG/1
50 TABLET ORAL EVERY 6 HOURS
Status: COMPLETED | OUTPATIENT
Start: 2025-01-23 | End: 2025-01-24

## 2025-01-23 RX ADMIN — ATORVASTATIN CALCIUM 80 MG: 80 TABLET, FILM COATED ORAL at 21:29

## 2025-01-23 RX ADMIN — SEVELAMER CARBONATE 800 MG: 800 TABLET, FILM COATED ORAL at 13:16

## 2025-01-23 RX ADMIN — GABAPENTIN 300 MG: 300 CAPSULE ORAL at 21:30

## 2025-01-23 RX ADMIN — ACETAMINOPHEN 975 MG: 325 TABLET, FILM COATED ORAL at 10:57

## 2025-01-23 RX ADMIN — MELATONIN 6 MG: 3 TAB ORAL at 20:17

## 2025-01-23 RX ADMIN — VALSARTAN 320 MG: 160 TABLET, FILM COATED ORAL at 23:18

## 2025-01-23 RX ADMIN — PREDNISONE 50 MG: 20 TABLET ORAL at 23:16

## 2025-01-23 RX ADMIN — EPOETIN ALFA-EPBX 10000 UNITS: 10000 INJECTION, SOLUTION INTRAVENOUS; SUBCUTANEOUS at 08:21

## 2025-01-23 RX ADMIN — PETROLATUM: 420 OINTMENT TOPICAL at 21:28

## 2025-01-23 RX ADMIN — CLONIDINE HYDROCHLORIDE 0.1 MG: 0.1 TABLET ORAL at 00:14

## 2025-01-23 RX ADMIN — CARVEDILOL 25 MG: 12.5 TABLET, FILM COATED ORAL at 21:30

## 2025-01-23 RX ADMIN — PANTOPRAZOLE SODIUM 40 MG: 40 TABLET, DELAYED RELEASE ORAL at 13:16

## 2025-01-23 RX ADMIN — ASPIRIN 81 MG: 81 TABLET, COATED ORAL at 13:24

## 2025-01-23 RX ADMIN — Medication: at 21:28

## 2025-01-23 RX ADMIN — CARVEDILOL 25 MG: 12.5 TABLET, FILM COATED ORAL at 13:16

## 2025-01-23 RX ADMIN — PREDNISONE 50 MG: 20 TABLET ORAL at 17:26

## 2025-01-23 RX ADMIN — SEVELAMER CARBONATE 800 MG: 800 TABLET, FILM COATED ORAL at 18:23

## 2025-01-23 RX ADMIN — DILTIAZEM HYDROCHLORIDE 360 MG: 180 CAPSULE, COATED, EXTENDED RELEASE ORAL at 13:16

## 2025-01-23 RX ADMIN — METOCLOPRAMIDE 10 MG: 10 TABLET ORAL at 21:29

## 2025-01-23 RX ADMIN — TRAZODONE HYDROCHLORIDE 50 MG: 50 TABLET ORAL at 01:15

## 2025-01-23 RX ADMIN — INSULIN LISPRO 2 UNITS: 100 INJECTION, SOLUTION INTRAVENOUS; SUBCUTANEOUS at 16:58

## 2025-01-23 RX ADMIN — METOCLOPRAMIDE 10 MG: 10 TABLET ORAL at 13:16

## 2025-01-23 RX ADMIN — Medication: at 13:15

## 2025-01-23 RX ADMIN — RENO CAPS 1 CAPSULE: 100; 1.5; 1.7; 20; 10; 1; 150; 5; 6 CAPSULE ORAL at 13:16

## 2025-01-23 RX ADMIN — PETROLATUM: 420 OINTMENT TOPICAL at 13:15

## 2025-01-23 RX ADMIN — CLONIDINE HYDROCHLORIDE 0.1 MG: 0.1 TABLET ORAL at 21:31

## 2025-01-23 RX ADMIN — CEPHALEXIN 500 MG: 500 CAPSULE ORAL at 21:31

## 2025-01-23 RX ADMIN — CEPHALEXIN 500 MG: 500 CAPSULE ORAL at 13:15

## 2025-01-23 RX ADMIN — APIXABAN 5 MG: 5 TABLET, FILM COATED ORAL at 13:26

## 2025-01-23 RX ADMIN — FLUTICASONE PROPIONATE 2 SPRAY: 50 SPRAY, METERED NASAL at 13:15

## 2025-01-23 RX ADMIN — SENNOSIDES AND DOCUSATE SODIUM 1 TABLET: 50; 8.6 TABLET ORAL at 13:16

## 2025-01-23 RX ADMIN — ACETAMINOPHEN 975 MG: 325 TABLET, FILM COATED ORAL at 18:24

## 2025-01-23 RX ADMIN — LIDOCAINE 4% 1 PATCH: 40 PATCH TOPICAL at 13:24

## 2025-01-23 RX ADMIN — CLONIDINE HYDROCHLORIDE 0.1 MG: 0.1 TABLET ORAL at 13:17

## 2025-01-23 RX ADMIN — DICYCLOMINE HYDROCHLORIDE 10 MG: 10 CAPSULE ORAL at 20:18

## 2025-01-23 ASSESSMENT — PAIN - FUNCTIONAL ASSESSMENT
PAIN_FUNCTIONAL_ASSESSMENT: 0-10
PAIN_FUNCTIONAL_ASSESSMENT: NO/DENIES PAIN
PAIN_FUNCTIONAL_ASSESSMENT: 0-10
PAIN_FUNCTIONAL_ASSESSMENT: 0-10

## 2025-01-23 ASSESSMENT — PAIN SCALES - GENERAL
PAINLEVEL_OUTOF10: 0 - NO PAIN
PAINLEVEL_OUTOF10: 0 - NO PAIN
PAINLEVEL_OUTOF10: 4
PAINLEVEL_OUTOF10: 4
PAINLEVEL_OUTOF10: 7
PAINLEVEL_OUTOF10: 8
PAINLEVEL_OUTOF10: 3

## 2025-01-23 ASSESSMENT — PAIN SCALES - WONG BAKER
WONGBAKER_NUMERICALRESPONSE: HURTS LITTLE MORE
WONGBAKER_NUMERICALRESPONSE: HURTS LITTLE BIT

## 2025-01-23 ASSESSMENT — PAIN SCALES - PAIN ASSESSMENT IN ADVANCED DEMENTIA (PAINAD): BODYLANGUAGE: NORMAL

## 2025-01-23 ASSESSMENT — PAIN DESCRIPTION - LOCATION
LOCATION: BACK

## 2025-01-23 ASSESSMENT — PAIN DESCRIPTION - ORIENTATION: ORIENTATION: MID

## 2025-01-23 NOTE — RESEARCH NOTES
Artificial Intelligence Monitoring in Nursing (AIMS Nursing) Study    Principle Investigator - Dr. Feliciano Wallace  Research Coordinator - Milana Hebert RN     Patient Name - Stacey Heath  Date - 1/23/2025 12:23 PM  Location - [place] [address]    Stacey Heath was approached by Milana Hebert RN to talk about participating in the AIMS Nursing Study. The patient was not able to be approached, a research coordinator will come back at a later time. Study protocol was followed and patient was given study contact information.     Milana Hebert RN

## 2025-01-23 NOTE — PROGRESS NOTES
Stacey Heath  Age: 54 y.o.  MRN: [id number]  Date: 1/16/2025  Location of service: phone call    Program Details  Medicaid Community Clinical Case Management  Status: Enrolled  Effective Dates: 10/17/2023 - present  Responsible Staff: NAREN Davidson      Goals Reviewed:      Summary:  Patient calls this writer and states her fiancée will likely not be able to meet today at 6pm d/t the weather. This writer instructs patient to keep this writer updated. Patient agrees. Patient calls this writer throughout the day to keep this writer updated and patient states her fikingse will, in fact, not be able to attend.    Appointment start time: 1018  Appointment completion time: 1023  Total time spent with patient (in minutes): 5  Non-Billable Time: 5  Billable Time Total: 0    Roberta Gallego RN

## 2025-01-23 NOTE — NURSING NOTE
Report to Receiving RN:    Report To: Abram  Time Report Called: 1215  Hand-Off Communication: 2.2 L removed, post /81, P 74  Complications During Treatment: Yes, restless, infiltrated needle, CO pain.  Ultrafiltration Treatment: Yes  Medications Administered During Dialysis: Yes, Tylenol  Blood Products Administered During Dialysis: No  Labs Sent During Dialysis: No  Heparin Drip Rate Changes: N/A  Dialysis Catheter Dressing: NA  Last Dressing Change: NA, her R AVF quickly clotted, dressing in place.    Last Updated: 12:23 PM by ROXY KWOK

## 2025-01-23 NOTE — CONSULTS
IR Consult Note:     Assessment/Plan     54 y.o. female PMH significant for ESRD 2/2 on dialysis T/Th/Sat via RUE AVF (last complete session 01/04/2025), HTN, HFpEF, COPD, brachial DVT on Eliquis initially managed for htn emergency and pulm edema in micu. Has since improved and been on the floors managed by IM for left breast cellulitis.       Pt has been experiencing prolonged bleeding after her dialysis. IR was consulted for Fistulagram with possible intervention.       Recs:     - Fistula examined and showed palpable thrill. Prolonged bleeding after dialysis suspected to be from outlow stenosis.   - Patient consented and will plan for intervention tomorrow   - NPO at midnight   - Iodine allergy - Please prep for contrast allergy 13 hours prep. Prednisone 50 mg PO at 13 hrs, 7 hrs, and 1 hr prior. + 50 mg PO of benadryl 1 hr prior procedure   - No need to withhold AC before fistula intervention.         Physical Exam  Constitutional:       Appearance: Normal appearance.   HENT:      Head: Normocephalic.   Eyes:      Extraocular Movements: Extraocular movements intact.   Cardiovascular:      Comments: PALPABLE THRILL ON RU Arm fistula  Pulmonary:      Effort: Pulmonary effort is normal.   Abdominal:      General: Abdomen is flat.   Neurological:      Mental Status: She is oriented to person, place, and time.   Psychiatric:         Behavior: Behavior normal.         Patient discussed with IR attending MD Colt Luke MD   PGY-4, Vascular & Interventional Radiology     Results for orders placed or performed during the hospital encounter of 12/29/24 (from the past 24 hours)   POCT GLUCOSE   Result Value Ref Range    POCT Glucose 208 (H) 74 - 99 mg/dL   CBC   Result Value Ref Range    WBC 5.2 4.4 - 11.3 x10*3/uL    nRBC 0.0 0.0 - 0.0 /100 WBCs    RBC 3.14 (L) 4.00 - 5.20 x10*6/uL    Hemoglobin 9.1 (L) 12.0 - 16.0 g/dL    Hematocrit 30.0 (L) 36.0 - 46.0 %    MCV 96 80 - 100 fL    MCH 29.0 26.0 - 34.0  pg    MCHC 30.3 (L) 32.0 - 36.0 g/dL    RDW 14.8 (H) 11.5 - 14.5 %    Platelets 186 150 - 450 x10*3/uL   Renal Function Panel   Result Value Ref Range    Glucose 112 (H) 74 - 99 mg/dL    Sodium 139 136 - 145 mmol/L    Potassium 5.3 3.5 - 5.3 mmol/L    Chloride 97 (L) 98 - 107 mmol/L    Bicarbonate 30 21 - 32 mmol/L    Anion Gap 17 10 - 20 mmol/L    Urea Nitrogen 44 (H) 6 - 23 mg/dL    Creatinine 9.11 (H) 0.50 - 1.05 mg/dL    eGFR 5 (L) >60 mL/min/1.73m*2    Calcium 9.0 8.6 - 10.6 mg/dL    Phosphorus 6.3 (H) 2.5 - 4.9 mg/dL    Albumin 3.7 3.4 - 5.0 g/dL   POCT GLUCOSE   Result Value Ref Range    POCT Glucose 103 (H) 74 - 99 mg/dL   POCT GLUCOSE   Result Value Ref Range    POCT Glucose 199 (H) 74 - 99 mg/dL     *Note: Due to a large number of results and/or encounters for the requested time period, some results have not been displayed. A complete set of results can be found in Results Review.

## 2025-01-23 NOTE — PROGRESS NOTES
Nephrology Follow-up Note   Patient ID: Stacey Heath is a 54 y.o. female.   Admitted for :   Chief Complaint   Patient presents with    Shortness of Breath      Evaluation of patient on dialysis at [facility]  [address]  [facility] [id number] on 12/29/2024  Seen and examined during hemodialysis. Labs and events reviewed.      Subjective:   Reported superficial skin scab above AVF bleeding post HD last time; no bleeding reported today   No c/o related to HD     Patient Active Problem List   Diagnosis    Anxiety    Astigmatism    Carotid bruit    Diabetes mellitus type 2, uncomplicated (Multi)    Coronary artery disease involving native coronary artery    Iron deficiency anemia    Migraine    Neuropathy    Transplant    Polyneuropathy due to type 2 diabetes mellitus (Multi)    Nuclear senile cataract of both eyes    Normocytic normochromic anemia    Low back pain    Hidradenitis    Complex dyslipidemia    Chronic heart failure with preserved ejection fraction (HFpEF)    Malnutrition of moderate degree (Multi)    Kidney transplant candidate    Nonrheumatic mitral valve regurgitation    Chronic deep vein thrombosis (DVT) of brachial vein of left upper extremity (Multi)    Acute diastolic CHF (congestive heart failure)    Hypertensive emergency    Resistant hypertension    Congestive heart failure    ESRD (end stage renal disease) on dialysis (Multi)    Flash pulmonary edema    Sleep-related breathing disorder    Congestive heart failure, unspecified HF chronicity, unspecified heart failure type    COPD exacerbation (Multi)       Scheduled medications:  acetaminophen, 975 mg, oral, q8h  ammonium lactate, , Topical, BID  apixaban, 5 mg, oral, BID  aspirin, 81 mg, oral, Daily  atorvastatin, 80 mg, oral, Nightly  bisacodyl, 10 mg, rectal, Daily  carvedilol, 25 mg, oral, BID  cephalexin, 500 mg, oral, q12h BELEN  cloNIDine, 0.1 mg, oral, TID  dilTIAZem CD, 360 mg, oral, Daily  epoetin  joceline or biosimilar, 10,000 Units, intravenous, Once per day on Tuesday Thursday Saturday  fluticasone, 2 spray, Each Nostril, Daily  fluticasone furoate-vilanteroL, 1 puff, inhalation, Daily  gabapentin, 300 mg, oral, Nightly  [Held by provider] hydrALAZINE, 100 mg, oral, TID  insulin lispro, 0-10 Units, subcutaneous, TID AC  lactulose, 300 mL, rectal, Once  lidocaine, 1 patch, transdermal, Daily  melatonin, 6 mg, oral, Daily  metoclopramide, 10 mg, oral, q8h  pantoprazole, 40 mg, oral, Daily before breakfast  perflutren lipid microspheres, 0.5-10 mL of dilution, intravenous, Once in imaging  sennosides-docusate sodium, 1 tablet, oral, BID  sevelamer carbonate, 800 mg, oral, TID  valsartan, 320 mg, oral, q PM  vitamin B complex-vitamin C-folic acid, 1 capsule, oral, Daily  white petrolatum, , Topical, BID         PRN medications: albuterol, diclofenac sodium, dicyclomine, diphenhydrAMINE, ipratropium-albuteroL, lidocaine, lidocaine-diphenhydraMINE-Maalox 1:1:1, ondansetron, polyethylene glycol, pramoxine, sodium chloride, SUMAtriptan, traZODone, trimethobenzamide     Heart Rate:  [73-80]   Temp:  [36.1 °C (97 °F)-36.8 °C (98.2 °F)]   BP: (158-172)/(59-74)   SpO2:  [95 %-98 %]    Weight: 55.3 kg (122 lb)   Gen:NAD  HEENT: NC/AT  LE: no edema , no cyanosis   Dialysis acces:  RUEAVF      Lab Results   Component Value Date    WBC 5.2 01/23/2025    HGB 9.1 (L) 01/23/2025    HCT 30.0 (L) 01/23/2025    MCV 96 01/23/2025     01/23/2025     Lab Results   Component Value Date    GLUCOSE 86 01/22/2025    CALCIUM 9.7 01/22/2025     01/22/2025    K 4.7 01/22/2025    CO2 31 01/22/2025    CL 94 (L) 01/22/2025    BUN 27 (H) 01/22/2025    CREATININE 6.74 (H) 01/22/2025     Results from last 72 hours   Lab Units 01/23/25  0727 01/22/25  0820   ALBUMIN g/dL  --  3.8   GLUCOSE mg/dL  --  86   HEMOGLOBIN g/dL 9.1* 10.4*   WBC AUTO x10*3/uL 5.2 6.4      Results from last 72 hours   Lab Units 01/22/25  0820   SODIUM mmol/L  138   POTASSIUM mmol/L 4.7   CO2 mmol/L 31   BUN mg/dL 27*   CREATININE mg/dL 6.74*   PHOSPHORUS mg/dL 5.8*   CALCIUM mg/dL 9.7        Assessment   ESRD-HD admitted with pulmonary edema  Now being treated for pancreatitis - potentially induced by loop diuretic?    Plan   Plan HD per submitted orders, UF 3L  as tolerated  MBD - Phosphorus binder: continue with Sevelamer   Anemia of CKD: EPO to be given x 3 per week   Access: issues as above  BP: acceptable during treatment   Renal Diet   Daily renal MVI   Continue 3 x per week hemodialysis; TTS  Haydee Mosher MD MPH

## 2025-01-23 NOTE — NURSING NOTE
Report from Sending RN:    Report From: Todd ( RN)  Recent Surgery of Procedure: No  Baseline Level of Consciousness (LOC): a/o x 4  Oxygen Use: No  Type: none  Diabetic: Yes, 208  Last BP Med Given Day of Dialysis: none  Last Pain Med Given: none  Lab Tests to be Obtained with Dialysis: Yes, cbc, rfp  Blood Transfusion to be Given During Dialysis: No  Available IV Access: No  Medications to be Administered During Dialysis: Yes, Epogen 20,000 unit  Continuous IV Infusion Running: No  Restraints on Currently or in the Last 24 Hours: No  Hand-Off Communication: No acute overnight or morning events; vss; Pt did not take morning medications; Pt will need labs; Pt is a full code.  Dialysis Catheter Dressing: left AVF  Last Dressing Change: will assess when pt arrives to the unit

## 2025-01-23 NOTE — CARE PLAN
Problem: Discharge Planning  Goal: Discharge to home or other facility with appropriate resources  Outcome: Progressing     Problem: Diabetes  Goal: Achieve decreasing blood glucose levels by end of shift  Outcome: Progressing  Goal: Increase stability of blood glucose readings by end of shift  Outcome: Progressing  Goal: Maintain electrolyte levels within acceptable range throughout shift  Outcome: Progressing  Goal: Maintain glucose levels >70mg/dl to <250mg/dl throughout shift  Outcome: Progressing  Goal: Vital signs within normal range for age by end of shift  Outcome: Progressing

## 2025-01-23 NOTE — NURSING NOTE
This nurse spoke with Dialysis Nurse and verified that patient did receive the medication   epoetin joceline-epbx during HD and is documented in MAR.       Abram Henning RN

## 2025-01-23 NOTE — PROGRESS NOTES
Stacey Heath is a 54 y.o. female on day 25 of admission presenting with Hypertensive emergency.  Met with pt today along with Dr Fernandez, and Dr Monteiro, from Palliative care.  Patient's current clinical condition, including diagnosis, prognosis, and management plan were discussed.  Pt is  and her  Hai is her primary support.  They are trying to get custody of 14 year old nephew. She expressed that she wishes to be DNR, no intubation, no feeding tube.  She reports that she has expressed her wishes to her family but would like to complete HCPOA paperwork to delegate her daughter as her surrogate.  SW will assist her with this paperwork.  Pt has 4 children, all grown, one just had a baby.  She was able to go visit with her  at [facility] this week.  She expressed her goals are to be eating healthier, to stay out of the hospital and enjoy her family.  SW to meet with paper to complete HCPOA, Palliative care team to follow for support and resources as needed.     PORFIRIO OCHOA

## 2025-01-23 NOTE — CARE PLAN
Problem: Diabetes  Goal: Achieve decreasing blood glucose levels by end of shift  Outcome: Progressing     Problem: Diabetes  Goal: Maintain glucose levels >70mg/dl to <250mg/dl throughout shift  Outcome: Progressing     Problem: Diabetes  Goal: Achieve decreasing blood glucose levels by end of shift  Outcome: Progressing   The patient's goals for the shift include      The clinical goals for the shift include Pt. safety will be maintained throughout shift.

## 2025-01-23 NOTE — PROGRESS NOTES
Transitional Care Coordination Progress Note:  Patient discussed during interdisciplinary rounds.  Team members present: MD, AGUSTIN  Plan per Medical/Surgical team: Per attending pt needs Palliative care final recommendations and Renal team is still undergoing HD on her to get her back to her dry weight.  [facility]   Status: Inpatient  Discharge disposition: [facility] ([facility]) for RN/LPN (assessment/med compliance), PT/OT and SW services pending SOC.   Potential Barriers: none  ADOD: 1/25  Care coordinator will continue to follow for discharge planning needs.     Dorothy Ramos RN  Transitional Care Coordinator (TCC)  [phone] or [phone]

## 2025-01-23 NOTE — CONSULTS
Vascular Surgery History & Physical     Assessment/Plan  Stacey Heath is 54 y.o. female with history of HTN, CAD, T2DM, anxiety, CHF, COPD, ESRD with RUE brachiocephalic fistula created in August 2021 (dialysis T, Th, Sa) who was originally admitted for acute onset shortness of breath and fluid management, underwent dialysis session today complicated by prolonged bleeding at her puncture site. Vascular surgery consulted for evaluation of her fistula. The fistula was noted to have failed in Oct/Nov 2021 with a concern for clot. She was seen by Dr. Farah in 04/2022 and underwent successful percutaneous transluminal angioplasty to the AVF.      She now presents with prolonged fistula access site bleeding for the last couple of months concerning for stenosis. Fistulogram would allow identification of stenosis and/or characterization of anatomy, and intervention if needed.     Plan:  Please consult IR for availability to complete fistulogram tomorrow, 1/24  If IR unable to complete fistulogram tomorrow, our team can complete in OR on Monday.   Can continue to use fistula in the meantime     D/w attending, Dr. Sonja Mosher MD  PGY-1 General Surgery  Vascular Surgery    Subjective   HPI:  Stacey Heath is 54 y.o. female with history of HTN, CAD, T2DM, anxiety, CHF, COPD, ESRD with RUE brachiocephalic fistula created in August 2021 (dialysis T, Th, F) who is admitted for acute onset shortness of breath, underwent dialysis session today complicated by prolonged bleeding at her puncture site. Vascular surgery consulted for evaluation of her fistula. The fistula was noted to have failed in Oct/Nov 2021 with a concern for clot. She was seen by Dr. Farah in 04/2022 and underwent successful percutaneous transluminal angioplasty to the AVF.      Patient states she has had prolonged bleeding at her fistula site for a couple of months. It takes 40 min-1 hr of manual pressure to stop the bleeding. She states that  yesterday it began spontaneously bleeding at a prior AVF access site. She underwent a dialysis session today with manual pressure.     PMH:   Past Medical History:   Diagnosis Date    Bacteremia due to methicillin resistant Staphylococcus aureus 07/08/2024    Cardiac/pericardial tamponade 01/20/2024    CHF (congestive heart failure)     COPD (chronic obstructive pulmonary disease) (Multi)     Coronary artery disease     Disorder of sweat glands 02/25/2023    Dry eye syndrome of bilateral lacrimal glands 03/07/2017    Dry eyes    ESRD (end stage renal disease) (Multi)     Essential (primary) hypertension 12/27/2022    Hypertension    History of acute pancreatitis 12/21/2020    History of acute pancreatitis    Low grade squamous intraepithelial lesion (LGSIL) on cervicovaginal cytologic smear 02/25/2023    Migraines     History of migraine    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    Type 2 myocardial infarction (Multi) 01/20/2024        PSH:   Past Surgical History:   Procedure Laterality Date    APPENDECTOMY  03/07/2017    Appendectomy    CT ABDOMEN ANGIOGRAM W AND/OR WO IV CONTRAST  04/23/2023    CT ABDOMEN ANGIOGRAM W AND/OR WO IV CONTRAST [facility]    OTHER SURGICAL HISTORY  03/07/2017    Cystoscopy With Pyeloscopy With Removal Of Calculus    OTHER SURGICAL HISTORY  03/07/2017    Anoscopy For Polyp Removal    OTHER SURGICAL HISTORY  12/08/2021    Arteriovenous fistula creation procedure    OTHER SURGICAL HISTORY  12/08/2021    Dialysis tunneled catheter placement    TUBAL LIGATION  03/07/2017    Tubal Ligation      Home Meds:  No current facility-administered medications on file prior to encounter.     Current Outpatient Medications on File Prior to Encounter   Medication Sig Dispense Refill    acetaminophen (Tylenol) 325 mg tablet Take 2 tablets (650 mg) by mouth every 6 hours if needed for headaches. 30 tablet 0    albuterol (ProAir HFA) 90 mcg/actuation inhaler Inhale 2 puffs every 4 hours if needed  for wheezing or shortness of breath. 18 g 5    albuterol 1.25 mg/3 mL nebulizer solution Take 3 mL (1.25 mg) by nebulization every 6 hours if needed for wheezing or shortness of breath. 90 mL 11    apixaban (Eliquis) 5 mg tablet Take 1 tablet (5 mg) by mouth 2 times a day. 60 tablet 0    aspirin 81 mg EC tablet Take 1 tablet (81 mg) by mouth once daily. 30 tablet 0    atorvastatin (Lipitor) 80 mg tablet Take 1 tablet (80 mg) by mouth once daily at bedtime. 30 tablet 0    calcium acetate (Phoslo) 667 mg capsule Take 2 capsules (1,334 mg) by mouth 3 times daily (morning, midday, late afternoon). 180 capsule 0    carvedilol (Coreg) 25 mg tablet Take 1 tablet (25 mg) by mouth 2 times a day. 60 tablet 0    cloNIDine (Catapres) 0.1 mg tablet Take 2 tablets (0.2 mg) by mouth once daily at bedtime. 60 tablet 0    epoetin joceline (Epogen,Procrit) 10,000 unit/mL injection Inject 1 mL (10,000 Units) under the skin 3 times a week.      fluticasone (Flonase) 50 mcg/actuation nasal spray Administer 2 sprays into each nostril once daily. Shake gently. Before first use, prime pump. After use, clean tip and replace cap. (Patient taking differently: Administer 2 sprays into each nostril once daily as needed for allergies. Shake gently. Before first use, prime pump. After use, clean tip and replace cap.) 16 g 12    fluticasone furoate-vilanteroL (Breo Ellipta) 100-25 mcg/dose inhaler Inhale 1 puff once daily. 60 each 11    gabapentin (Neurontin) 300 mg capsule Take 1 capsule (300 mg) by mouth once daily at bedtime. (Patient taking differently: Take 1 capsule (300 mg) by mouth as needed at bedtime (pain).) 30 capsule 1    NIFEdipine ER (Adalat CC) 90 mg 24 hr tablet Take 1 tablet (90 mg) by mouth once daily at bedtime. Do not crush, chew, or split. 30 tablet 0    pantoprazole (ProtoNix) 40 mg EC tablet Take 1 tablet (40 mg) by mouth if needed (not regular take it). Do not crush, chew, or split. 30 tablet 0    SUMAtriptan (Imitrex) 25 mg  tablet Take 1 tablet (25 mg) by mouth 1 time if needed for migraine. May repeat dose once in 2 hours if no relief.  Do not exceed 2 doses in 24 hours. 9 tablet 2    torsemide (Demadex) 20 mg tablet Take 1 tablet after dialysis on dialysis days and twice daily on non dialysis days 44 tablet 2    valsartan (Diovan) 320 mg tablet Take 1 tablet (320 mg) by mouth once daily in the evening. (Patient not taking: Reported on 12/30/2024)          Allergies:  Allergies   Allergen Reactions    Iodine Hives, Itching and Unknown    Bee Pollen Unknown    Bioflavonoids Swelling    Citrus And Derivatives Unknown    Codeine Itching, Hives and Unknown     Tolerates percocet   Tolerates percocet    Tolerates percocet    Flowers Itching    Gadolinium-Containing Contrast Media Unknown    Shellfish Containing Products Swelling     SEAFOOD       SH/FH:   None elicited    ROS: 12 system negative except HPI    Objective   Vitals:  Heart Rate:  [73-85]   Temp:  [35.8 °C (96.4 °F)-36.8 °C (98.2 °F)]   BP: (140-172)/(59-74)   SpO2:  [95 %-99 %]     Exam:  Constitutional: No acute distress, resting comfortably  Neuro:  AOx3, grossly intact  ENMT: moist mucous membranes  Head/neck: atraumatic  CV: no tachycardia  Pulm: non-labored on room air  GI: soft, non-tender, non-distended  Skin: warm and dry  Musculoskeletal: moving all extremities  Extremities: RUE fistula with slow ooze at fistula access site, palpable thrill at AVF as well as proximally on upper arm     Labs:  Results from last 7 days   Lab Units 01/23/25  0727 01/22/25  0820 01/21/25  0733   WBC AUTO x10*3/uL 5.2 6.4 6.6   HEMOGLOBIN g/dL 9.1* 10.4* 10.6*   PLATELETS AUTO x10*3/uL 186 221 259      Results from last 7 days   Lab Units 01/23/25  0727 01/22/25  0820 01/21/25  0733   SODIUM mmol/L 139 138 138   POTASSIUM mmol/L 5.3 4.7 5.2   CHLORIDE mmol/L 97* 94* 90*   CO2 mmol/L 30 31 25   BUN mg/dL 44* 27* 65*   CREATININE mg/dL 9.11* 6.74* 10.38*   GLUCOSE mg/dL 112* 86 63*    PHOSPHORUS mg/dL 6.3* 5.8* 6.6*                Imaging:  None ordered

## 2025-01-23 NOTE — H&P
Vascular Surgery History & Physical  Assessment/Plan   Stacey Heath is 54 y.o. female with history of HTN, CAD, T2DM, anxiety, CHF, COPD, ESRD with RUE brachiocephalic fistula created in August 2021 (dialysis T, Th, F) who is admitted for acute onset shortness of breath, underwent dialysis session today complicated by prolonged bleeding at her puncture site. Vascular surgery consulted for evaluation of her fistula. The fistula was noted to have failed in Oct/Nov 2021 with a concern for clot. She was seen by Dr. Farah in 04/2022 and underwent successful percutaneous transluminal angioplasty to the AVF.     She now presents with prolonged fistula access site bleeding for the last couple of months. Fistulogram would allow identification of stenosis and/or characterization of natomy, and intervention if needed.    Plan:  Please consult IR for availability to complete fistulogram tomorrow, 1/24  If IR unable to complete fistulogram tomorrow, our team can complete in OR on Monday.   Can continue to use fistula in the meantime    D/w attending, Dr. Sonja Mosher MD  PGY-1 General Surgery  Vascular Surgery      Subjective     HPI:  Stacey Heath is 54 y.o. female with history of HTN, CAD, T2DM, anxiety, CHF, COPD, ESRD with RUE brachiocephalic fistula created in August 2021 (dialysis T, Th, F) who is admitted for acute onset shortness of breath, underwent dialysis session today complicated by prolonged bleeding at her puncture site. Vascular surgery consulted for evaluation of her fistula. Patient attempted to use the fistula the following Oct/Nov 2021 which failed with a concern for clot. She was seen by Dr. Farah in 04/2022 and underwent successful percutaneous transluminal angioplasty to the AVF.     Patient states that he has had prolonged bleeding at her fistula puncture site for a few months. She states that she requires manual pressure of 30 minutes to 1 hour. It has become worse and she states that  yesterday her fistula site began spontaneously bleeding.     PMH:   HTN  CAD  T2DM  Anxiety  CHF  COPD  ESRD     PSH:   Past Surgical History:   Procedure Laterality Date    APPENDECTOMY  03/07/2017    Appendectomy    CT ABDOMEN ANGIOGRAM W AND/OR WO IV CONTRAST  04/23/2023    CT ABDOMEN ANGIOGRAM W AND/OR WO IV CONTRAST [facility] CT    OTHER SURGICAL HISTORY  03/07/2017    Cystoscopy With Pyeloscopy With Removal Of Calculus    OTHER SURGICAL HISTORY  03/07/2017    Anoscopy For Polyp Removal    OTHER SURGICAL HISTORY  12/08/2021    Arteriovenous fistula creation procedure    OTHER SURGICAL HISTORY  12/08/2021    Dialysis tunneled catheter placement    TUBAL LIGATION  03/07/2017    Tubal Ligation        Relevant Home Meds:   Tylenol  Albuterol  Apixaban  Aspirin  Carvedilol  Clonidine  Epogen  Fluticasone   Gabapentin  Nifedipine  Pantoprazole  Sumatriptan   Torsemide  Valsartan  Vitamin B complex     Allergies: per chart review   Allergies   Allergen Reactions    Iodine Hives, Itching and Unknown    Bee Pollen Unknown    Bioflavonoids Swelling    Citrus And Derivatives Unknown    Codeine Itching, Hives and Unknown     Tolerates percocet   Tolerates percocet    Tolerates percocet    Flowers Itching    Gadolinium-Containing Contrast Media Unknown    Shellfish Containing Products Swelling     SEAFOOD        SH/FH: Not elicited     ROS: 12 system negative except HPI    Objective   Vitals:  Heart Rate:  [73-80]   Temp:  [35.8 °C (96.4 °F)-36.8 °C (98.2 °F)]   BP: (140-172)/(59-74)   SpO2:  [95 %-98 %]     Exam:  Constitutional: No acute distress, resting comfortably  Neuro:  AOx3, grossly intact  ENMT: moist mucous membranes  Head/neck: atraumatic  CV: no tachycardia  Pulm: non-labored on room air  GI: soft, non-tender, non-distended  Skin: warm and dry  Musculoskeletal: moving all extremities  Extremities: RUE fistula with palpable thrill, puncture site with minimal ooze    Labs:  Results from last 7 days   Lab Units  01/23/25  0727 01/22/25  0820 01/21/25  0733   WBC AUTO x10*3/uL 5.2 6.4 6.6   HEMOGLOBIN g/dL 9.1* 10.4* 10.6*   PLATELETS AUTO x10*3/uL 186 221 259      Results from last 7 days   Lab Units 01/23/25  0727 01/22/25  0820 01/21/25  0733   SODIUM mmol/L 139 138 138   POTASSIUM mmol/L 5.3 4.7 5.2   CHLORIDE mmol/L 97* 94* 90*   CO2 mmol/L 30 31 25   BUN mg/dL 44* 27* 65*   CREATININE mg/dL 9.11* 6.74* 10.38*   GLUCOSE mg/dL 112* 86 63*   PHOSPHORUS mg/dL 6.3* 5.8* 6.6*

## 2025-01-24 ENCOUNTER — APPOINTMENT (OUTPATIENT)
Dept: RADIOLOGY | Facility: HOSPITAL | Age: 55
DRG: 252 | End: 2025-01-24
Payer: COMMERCIAL

## 2025-01-24 LAB
ALBUMIN SERPL BCP-MCNC: 4.4 G/DL (ref 3.4–5)
ANION GAP SERPL CALC-SCNC: 22 MMOL/L (ref 10–20)
BASOPHILS # BLD AUTO: 0.01 X10*3/UL (ref 0–0.1)
BASOPHILS NFR BLD AUTO: 0.2 %
BUN SERPL-MCNC: 33 MG/DL (ref 6–23)
CALCIUM SERPL-MCNC: 10.5 MG/DL (ref 8.6–10.6)
CHLORIDE SERPL-SCNC: 95 MMOL/L (ref 98–107)
CO2 SERPL-SCNC: 25 MMOL/L (ref 21–32)
CREAT SERPL-MCNC: 5.66 MG/DL (ref 0.5–1.05)
EGFRCR SERPLBLD CKD-EPI 2021: 8 ML/MIN/1.73M*2
EOSINOPHIL # BLD AUTO: 0 X10*3/UL (ref 0–0.7)
EOSINOPHIL NFR BLD AUTO: 0 %
ERYTHROCYTE [DISTWIDTH] IN BLOOD BY AUTOMATED COUNT: 14.6 % (ref 11.5–14.5)
GLUCOSE BLD MANUAL STRIP-MCNC: 149 MG/DL (ref 74–99)
GLUCOSE BLD MANUAL STRIP-MCNC: 172 MG/DL (ref 74–99)
GLUCOSE BLD MANUAL STRIP-MCNC: 206 MG/DL (ref 74–99)
GLUCOSE BLD MANUAL STRIP-MCNC: 442 MG/DL (ref 74–99)
GLUCOSE BLD MANUAL STRIP-MCNC: 466 MG/DL (ref 74–99)
GLUCOSE SERPL-MCNC: 162 MG/DL (ref 74–99)
HCT VFR BLD AUTO: 37.5 % (ref 36–46)
HGB BLD-MCNC: 11.4 G/DL (ref 12–16)
IMM GRANULOCYTES # BLD AUTO: 0.02 X10*3/UL (ref 0–0.7)
IMM GRANULOCYTES NFR BLD AUTO: 0.3 % (ref 0–0.9)
LYMPHOCYTES # BLD AUTO: 0.71 X10*3/UL (ref 1.2–4.8)
LYMPHOCYTES NFR BLD AUTO: 12.1 %
MCH RBC QN AUTO: 29.2 PG (ref 26–34)
MCHC RBC AUTO-ENTMCNC: 30.4 G/DL (ref 32–36)
MCV RBC AUTO: 96 FL (ref 80–100)
MONOCYTES # BLD AUTO: 0.06 X10*3/UL (ref 0.1–1)
MONOCYTES NFR BLD AUTO: 1 %
NEUTROPHILS # BLD AUTO: 5.08 X10*3/UL (ref 1.2–7.7)
NEUTROPHILS NFR BLD AUTO: 86.4 %
NRBC BLD-RTO: 0 /100 WBCS (ref 0–0)
PHOSPHATE SERPL-MCNC: 4.4 MG/DL (ref 2.5–4.9)
PLATELET # BLD AUTO: 222 X10*3/UL (ref 150–450)
POTASSIUM SERPL-SCNC: 5.4 MMOL/L (ref 3.5–5.3)
RBC # BLD AUTO: 3.91 X10*6/UL (ref 4–5.2)
SODIUM SERPL-SCNC: 137 MMOL/L (ref 136–145)
WBC # BLD AUTO: 5.9 X10*3/UL (ref 4.4–11.3)

## 2025-01-24 PROCEDURE — 80069 RENAL FUNCTION PANEL: CPT | Performed by: STUDENT IN AN ORGANIZED HEALTH CARE EDUCATION/TRAINING PROGRAM

## 2025-01-24 PROCEDURE — 2500000005 HC RX 250 GENERAL PHARMACY W/O HCPCS: Performed by: STUDENT IN AN ORGANIZED HEALTH CARE EDUCATION/TRAINING PROGRAM

## 2025-01-24 PROCEDURE — 82947 ASSAY GLUCOSE BLOOD QUANT: CPT

## 2025-01-24 PROCEDURE — 2500000001 HC RX 250 WO HCPCS SELF ADMINISTERED DRUGS (ALT 637 FOR MEDICARE OP): Performed by: STUDENT IN AN ORGANIZED HEALTH CARE EDUCATION/TRAINING PROGRAM

## 2025-01-24 PROCEDURE — C1894 INTRO/SHEATH, NON-LASER: HCPCS

## 2025-01-24 PROCEDURE — 2500000004 HC RX 250 GENERAL PHARMACY W/ HCPCS (ALT 636 FOR OP/ED): Performed by: STUDENT IN AN ORGANIZED HEALTH CARE EDUCATION/TRAINING PROGRAM

## 2025-01-24 PROCEDURE — 99153 MOD SED SAME PHYS/QHP EA: CPT | Performed by: RADIOLOGY

## 2025-01-24 PROCEDURE — 1200000002 HC GENERAL ROOM WITH TELEMETRY DAILY

## 2025-01-24 PROCEDURE — 36415 COLL VENOUS BLD VENIPUNCTURE: CPT | Performed by: STUDENT IN AN ORGANIZED HEALTH CARE EDUCATION/TRAINING PROGRAM

## 2025-01-24 PROCEDURE — 99153 MOD SED SAME PHYS/QHP EA: CPT

## 2025-01-24 PROCEDURE — 027V3ZZ DILATION OF SUPERIOR VENA CAVA, PERCUTANEOUS APPROACH: ICD-10-PCS | Performed by: STUDENT IN AN ORGANIZED HEALTH CARE EDUCATION/TRAINING PROGRAM

## 2025-01-24 PROCEDURE — 2720000007 HC OR 272 NO HCPCS

## 2025-01-24 PROCEDURE — 36907 BALO ANGIOP CTR DIALYSIS SEG: CPT | Performed by: RADIOLOGY

## 2025-01-24 PROCEDURE — 2550000001 HC RX 255 CONTRASTS: Performed by: RADIOLOGY

## 2025-01-24 PROCEDURE — C1887 CATHETER, GUIDING: HCPCS

## 2025-01-24 PROCEDURE — 36902 INTRO CATH DIALYSIS CIRCUIT: CPT | Performed by: RADIOLOGY

## 2025-01-24 PROCEDURE — 2500000002 HC RX 250 W HCPCS SELF ADMINISTERED DRUGS (ALT 637 FOR MEDICARE OP, ALT 636 FOR OP/ED): Performed by: STUDENT IN AN ORGANIZED HEALTH CARE EDUCATION/TRAINING PROGRAM

## 2025-01-24 PROCEDURE — 2500000004 HC RX 250 GENERAL PHARMACY W/ HCPCS (ALT 636 FOR OP/ED): Performed by: RADIOLOGY

## 2025-01-24 PROCEDURE — C1725 CATH, TRANSLUMIN NON-LASER: HCPCS

## 2025-01-24 PROCEDURE — C1769 GUIDE WIRE: HCPCS

## 2025-01-24 PROCEDURE — 85025 COMPLETE CBC W/AUTO DIFF WBC: CPT | Performed by: STUDENT IN AN ORGANIZED HEALTH CARE EDUCATION/TRAINING PROGRAM

## 2025-01-24 PROCEDURE — 99152 MOD SED SAME PHYS/QHP 5/>YRS: CPT | Performed by: RADIOLOGY

## 2025-01-24 PROCEDURE — 7100000010 HC PHASE TWO TIME - EACH INCREMENTAL 1 MINUTE

## 2025-01-24 PROCEDURE — 99233 SBSQ HOSP IP/OBS HIGH 50: CPT | Performed by: STUDENT IN AN ORGANIZED HEALTH CARE EDUCATION/TRAINING PROGRAM

## 2025-01-24 PROCEDURE — 99152 MOD SED SAME PHYS/QHP 5/>YRS: CPT

## 2025-01-24 PROCEDURE — 7100000009 HC PHASE TWO TIME - INITIAL BASE CHARGE

## 2025-01-24 PROCEDURE — 76937 US GUIDE VASCULAR ACCESS: CPT | Performed by: RADIOLOGY

## 2025-01-24 RX ORDER — FENTANYL CITRATE 50 UG/ML
INJECTION, SOLUTION INTRAMUSCULAR; INTRAVENOUS
Status: COMPLETED | OUTPATIENT
Start: 2025-01-24 | End: 2025-01-24

## 2025-01-24 RX ORDER — INSULIN LISPRO 100 [IU]/ML
8 INJECTION, SOLUTION INTRAVENOUS; SUBCUTANEOUS ONCE
Status: COMPLETED | OUTPATIENT
Start: 2025-01-24 | End: 2025-01-24

## 2025-01-24 RX ORDER — DIPHENHYDRAMINE HCL 25 MG
50 CAPSULE ORAL ONCE
Status: COMPLETED | OUTPATIENT
Start: 2025-01-24 | End: 2025-01-24

## 2025-01-24 RX ORDER — HYDRALAZINE HYDROCHLORIDE 20 MG/ML
INJECTION INTRAMUSCULAR; INTRAVENOUS
Status: COMPLETED | OUTPATIENT
Start: 2025-01-24 | End: 2025-01-24

## 2025-01-24 RX ORDER — MIDAZOLAM HYDROCHLORIDE 1 MG/ML
INJECTION INTRAMUSCULAR; INTRAVENOUS
Status: COMPLETED | OUTPATIENT
Start: 2025-01-24 | End: 2025-01-24

## 2025-01-24 RX ADMIN — DILTIAZEM HYDROCHLORIDE 360 MG: 180 CAPSULE, COATED, EXTENDED RELEASE ORAL at 08:42

## 2025-01-24 RX ADMIN — MIDAZOLAM HYDROCHLORIDE 1 MG: 1 INJECTION, SOLUTION INTRAMUSCULAR; INTRAVENOUS at 14:40

## 2025-01-24 RX ADMIN — TRAZODONE HYDROCHLORIDE 50 MG: 50 TABLET ORAL at 01:06

## 2025-01-24 RX ADMIN — PETROLATUM: 420 OINTMENT TOPICAL at 08:43

## 2025-01-24 RX ADMIN — CLONIDINE HYDROCHLORIDE 0.1 MG: 0.1 TABLET ORAL at 08:42

## 2025-01-24 RX ADMIN — PREDNISONE 50 MG: 20 TABLET ORAL at 05:26

## 2025-01-24 RX ADMIN — SENNOSIDES AND DOCUSATE SODIUM 1 TABLET: 50; 8.6 TABLET ORAL at 08:42

## 2025-01-24 RX ADMIN — ACETAMINOPHEN 975 MG: 325 TABLET, FILM COATED ORAL at 01:06

## 2025-01-24 RX ADMIN — FENTANYL CITRATE 50 MCG: 50 INJECTION, SOLUTION INTRAMUSCULAR; INTRAVENOUS at 15:19

## 2025-01-24 RX ADMIN — PANTOPRAZOLE SODIUM 40 MG: 40 TABLET, DELAYED RELEASE ORAL at 08:41

## 2025-01-24 RX ADMIN — HYDRALAZINE HYDROCHLORIDE 100 MG: 50 TABLET ORAL at 10:06

## 2025-01-24 RX ADMIN — ATORVASTATIN CALCIUM 80 MG: 80 TABLET, FILM COATED ORAL at 20:30

## 2025-01-24 RX ADMIN — ACETAMINOPHEN 975 MG: 325 TABLET, FILM COATED ORAL at 17:01

## 2025-01-24 RX ADMIN — INSULIN LISPRO 8 UNITS: 100 INJECTION, SOLUTION INTRAVENOUS; SUBCUTANEOUS at 22:08

## 2025-01-24 RX ADMIN — CARVEDILOL 25 MG: 12.5 TABLET, FILM COATED ORAL at 08:42

## 2025-01-24 RX ADMIN — DIPHENHYDRAMINE HYDROCHLORIDE 50 MG: 25 CAPSULE ORAL at 05:25

## 2025-01-24 RX ADMIN — VALSARTAN 320 MG: 160 TABLET, FILM COATED ORAL at 20:30

## 2025-01-24 RX ADMIN — SEVELAMER CARBONATE 800 MG: 800 TABLET, FILM COATED ORAL at 16:58

## 2025-01-24 RX ADMIN — FLUTICASONE PROPIONATE 2 SPRAY: 50 SPRAY, METERED NASAL at 08:43

## 2025-01-24 RX ADMIN — HYDRALAZINE HYDROCHLORIDE 10 MG: 20 INJECTION INTRAMUSCULAR; INTRAVENOUS at 14:45

## 2025-01-24 RX ADMIN — CARVEDILOL 25 MG: 12.5 TABLET, FILM COATED ORAL at 20:29

## 2025-01-24 RX ADMIN — Medication: at 20:31

## 2025-01-24 RX ADMIN — IOHEXOL 100 ML: 350 INJECTION, SOLUTION INTRAVENOUS at 15:07

## 2025-01-24 RX ADMIN — IOHEXOL 50 ML: 350 INJECTION, SOLUTION INTRAVENOUS at 15:28

## 2025-01-24 RX ADMIN — CEPHALEXIN 500 MG: 500 CAPSULE ORAL at 08:42

## 2025-01-24 RX ADMIN — PETROLATUM: 420 OINTMENT TOPICAL at 20:31

## 2025-01-24 RX ADMIN — GABAPENTIN 300 MG: 300 CAPSULE ORAL at 20:29

## 2025-01-24 RX ADMIN — DIPHENHYDRAMINE HYDROCHLORIDE 50 MG: 25 CAPSULE ORAL at 13:13

## 2025-01-24 RX ADMIN — HYDRALAZINE HYDROCHLORIDE 100 MG: 50 TABLET ORAL at 16:56

## 2025-01-24 RX ADMIN — FENTANYL CITRATE 50 MCG: 50 INJECTION, SOLUTION INTRAMUSCULAR; INTRAVENOUS at 14:40

## 2025-01-24 RX ADMIN — CEPHALEXIN 500 MG: 500 CAPSULE ORAL at 20:29

## 2025-01-24 RX ADMIN — METOCLOPRAMIDE 10 MG: 10 TABLET ORAL at 17:01

## 2025-01-24 RX ADMIN — Medication: at 08:43

## 2025-01-24 RX ADMIN — MIDAZOLAM HYDROCHLORIDE 1 MG: 1 INJECTION, SOLUTION INTRAMUSCULAR; INTRAVENOUS at 15:19

## 2025-01-24 RX ADMIN — CLONIDINE HYDROCHLORIDE 0.1 MG: 0.1 TABLET ORAL at 16:57

## 2025-01-24 RX ADMIN — MELATONIN 6 MG: 3 TAB ORAL at 17:01

## 2025-01-24 ASSESSMENT — COGNITIVE AND FUNCTIONAL STATUS - GENERAL
MOBILITY SCORE: 24
DAILY ACTIVITIY SCORE: 24

## 2025-01-24 ASSESSMENT — PAIN SCALES - GENERAL
PAINLEVEL_OUTOF10: 0 - NO PAIN
PAINLEVEL_OUTOF10: 4

## 2025-01-24 ASSESSMENT — PAIN - FUNCTIONAL ASSESSMENT
PAIN_FUNCTIONAL_ASSESSMENT: 0-10

## 2025-01-24 ASSESSMENT — PAIN SCALES - WONG BAKER: WONGBAKER_NUMERICALRESPONSE: HURTS LITTLE BIT

## 2025-01-24 ASSESSMENT — PAIN SCALES - PAIN ASSESSMENT IN ADVANCED DEMENTIA (PAINAD)
TOTALSCORE: MEDICATION (SEE MAR)
BREATHING: NORMAL

## 2025-01-24 ASSESSMENT — PAIN DESCRIPTION - LOCATION: LOCATION: HEAD

## 2025-01-24 NOTE — PROGRESS NOTES
Assessment/Plan     Stacey Heath is a 54 y.o. female PMH significant for ESRD 2/2 on dialysis T/Th/Sat via RUE AVF (last complete session 01/04/2025), HTN, HFpEF, COPD, brachial DVT on Eliquis initially managed for htn emergency and pulm edema in micu. Has been significantly above dry weight. Have been reducing her clonidine for while removing volume. She developed left breast cellulitis improved on keflex.         1/23    -Renal team recommended to hold Torsemide indefinitely, concerning for pancreatitis.  -IR consulted for fistulagram. RUE fistula keeps bleeding. Held Eliquis and ASA  -Appreciate palliative team help. PT states she will pay more attention to her diet and fluid intake. Reports she's been taking her medications.  She'll follow recommendations to get better. Educated about fluid restriction.  -No BM yet, abdominal pain resolved            1/21  - Breast cellulitis (swalling/warmth tenderness), improving galen Keflex.  - Renal is considering PD in the future as a more stable treatment.  - Palliative care involved   - Need to give healthy at home on dc, home care, maximize support  - She developed left breast cellulitis improved on keflex. Cf 10 days.  - She is now having continued diffiuculty moving her bowels    - Tolerating PO well. Advanced to Renal regular diet    - Continuing to try to get down to 50kg.  concerned she will drop her BP given her small heart cLVH/HFpEF, and have been weaning down the antihypertensives. Had some rebound        -------------------------------    Hypertensive Emergency - resolved  HFpEF, acute on chronic, resolved  - weaned off cardene drip  - still with elevated blood pressures, better controlled.   - TTE 01/03/2025 showed EF of 50-55 with left atrium severely dilated,      Mod-severe mitral annular calcification. Severely increased concentric systolic function. Dialyzing until closer to dry weight  - concern for hypotension given small LV size if pt becomes fluid  depleted. Currently better controlled. Will start q4h vitals, pt is off tele now. Changed notificaiton order parameter to notify if SBP <120 to monitor for signs of volume depletion.  - bp low in hd yesterday, normal on floor.   - weaning bp meds as she is more euvolemic. Decreased clonidine to 0.1 tid 1/18, decreased to Bid dosing 1/19. Plan to attempt to wean off. Hold bp meds prior to HD to allow room for fluid removal.    Abd discomfort  Nausea/vomitting  constipation  - check kub- pending read, appears to have a large stool burden, no dilated bowel.   - added ppi  -reports last bm yesterday, passing gas  - increase miralax to bid and make senna/doc 1 tab bid. Pt is incrasing activity. Developed some n/v, on zofran and reglan.  - Avoiding opioids for now which could worsen consitpation  - despite bowel regimen and half a golytely bowel prep still continuing.   - repeat kub showing stool has moved thorough colon, concern for developing ileus now. Pt has no stool in rectal vault on xray, awaiting CT abdomen/pelvis.   - of note pt has history of gastroparesis as well. She is maximized on reglan per her renal function.     R breast swelling  - with warm, edema and ttp, no erythema or drainage  - starting keflex.   - examination with nursing chaperone.   - monitor for response in next 24-48hrs. No fevers, no wbc elevation  - reports anxiety due to h/o breast cancers in family. Counselling provided.she reports she is due for screening. Plan to obtain in op setting.   - improving.     ESRD   - Dialysis T/Th/Sat   - continue HD/UF until closer to dry weight of 50kg per nephrology, currently 57kg after HD.   - may need bp dose reductions as closer to dry weight  -  hold hydralazine and clonidine the morning of Dialysis.    - Concern about dietary and fluid adherance.   - hold bp meds prior to hd    Chronic deep vein thrombosis (DVT) of brachial vein of left upper extremity   - Continue with Eliquis     Headache likely  "due to migraine vs Hypertensive emergency  - improved    COPD   -Continue Albuterol neb q6hr prn     Insomnia   - Melatonin and trazodone prn    Acute on chronic anemia   - in setting of esrd  - no bleeding noted  - monitor    DMII with gastroparesis  - continue reglan and zofran prn  - continue bowel regimen  - no longer on medications for dm in setting of her esrd  - pt on reglan.     Continuing goals of care, consulted palliative care. Nephrology had [facility] meeting with behavioral jonah and would like to involve palliative in goc.  Pt appears to have better understanding of her condition since the meeting.         70 min      Scheduled outpatient appointments in system:   Future Appointments   Date Time Provider Department Center   1/27/2025  4:00 PM Makenna Barraza MD [id number] [facility]   3/25/2025  1:20 PM Nate Hyman MD [id number] [facility]   3/28/2025  2:30 PM Sherri Gastelum MD [id number] None     ---------------------------------------------------------------------------------------------------  Subjective   No events.    ---------------------------------------------------------------------------------------------------  Objective   Last Recorded Vitals  Blood pressure 150/69, pulse 80, temperature 36.2 °C (97.2 °F), resp. rate 17, height 1.397 m (4' 7\"), weight 61.1 kg (134 lb 11.2 oz), SpO2 96%.  Intake/Output last 3 Shifts:  I/O last 3 completed shifts:  In: 1060 (17.3 mL/kg) [P.O.:220; I.V.:800 (13.1 mL/kg); Other:40]  Out: 2603 (42.6 mL/kg) [Other:2603]  Weight: 61.1 kg     Physical Exam  Vitals and nursing note reviewed.   Constitutional:       General: She is not in acute distress.     Appearance: Normal appearance. She is obese.   HENT:      Head: Normocephalic and atraumatic.      Mouth/Throat:      Mouth: Mucous membranes are moist.   Eyes:      General: No scleral icterus.     Extraocular Movements: Extraocular movements intact.      Conjunctiva/sclera: Conjunctivae normal. "   Cardiovascular:      Rate and Rhythm: Normal rate and regular rhythm.      Heart sounds: S1 normal and S2 normal. No murmur heard.  Pulmonary:      Effort: Pulmonary effort is normal. No respiratory distress.      Breath sounds: No wheezing, rhonchi or rales.      Comments: Diminished b/l  Abdominal:      General: Bowel sounds are normal. There is no distension.      Palpations: Abdomen is soft.      Tenderness: There is no abdominal tenderness. There is no guarding or rebound.   Musculoskeletal:         General: No swelling or deformity.      Cervical back: Neck supple.      Comments: R breast swelling, ttp, warmth noted.  no erythema or drainage   Skin:     General: Skin is warm and dry.      Findings: No rash.   Neurological:      General: No focal deficit present.      Mental Status: She is alert and oriented to person, place, and time. Mental status is at baseline.      Sensory: No sensory deficit.      Motor: No weakness.   Psychiatric:         Mood and Affect: Mood normal.         Behavior: Behavior normal.         Relevant Results  Lab Results   Component Value Date    WBC 5.2 01/23/2025    HGB 9.1 (L) 01/23/2025    HCT 30.0 (L) 01/23/2025    MCV 96 01/23/2025     01/23/2025      Lab Results   Component Value Date    GLUCOSE 112 (H) 01/23/2025    CALCIUM 9.0 01/23/2025     01/23/2025    K 5.3 01/23/2025    CO2 30 01/23/2025    CL 97 (L) 01/23/2025    BUN 44 (H) 01/23/2025    CREATININE 9.11 (H) 01/23/2025     Scheduled medications  acetaminophen, 975 mg, oral, q8h  ammonium lactate, , Topical, BID  apixaban, 5 mg, oral, BID  aspirin, 81 mg, oral, Daily  atorvastatin, 80 mg, oral, Nightly  bisacodyl, 10 mg, rectal, Daily  carvedilol, 25 mg, oral, BID  cephalexin, 500 mg, oral, q12h BELEN  cloNIDine, 0.1 mg, oral, TID  dilTIAZem CD, 360 mg, oral, Daily  [START ON 1/24/2025] diphenhydrAMINE, 50 mg, intravenous, Once  epoetin joceline or biosimilar, 10,000 Units, intravenous, Once per day on Tuesday  Thursday Saturday  fluticasone, 2 spray, Each Nostril, Daily  fluticasone furoate-vilanteroL, 1 puff, inhalation, Daily  gabapentin, 300 mg, oral, Nightly  [Held by provider] hydrALAZINE, 100 mg, oral, TID  insulin lispro, 0-10 Units, subcutaneous, TID AC  lactulose, 300 mL, rectal, Once  lidocaine, 1 patch, transdermal, Daily  melatonin, 6 mg, oral, Daily  metoclopramide, 10 mg, oral, q8h  pantoprazole, 40 mg, oral, Daily before breakfast  perflutren lipid microspheres, 0.5-10 mL of dilution, intravenous, Once in imaging  predniSONE, 50 mg, oral, q6h  sennosides-docusate sodium, 1 tablet, oral, BID  sevelamer carbonate, 800 mg, oral, TID  valsartan, 320 mg, oral, q PM  vitamin B complex-vitamin C-folic acid, 1 capsule, oral, Daily  white petrolatum, , Topical, BID      Continuous medications     PRN medications  PRN medications: albuterol, diclofenac sodium, dicyclomine, diphenhydrAMINE, ipratropium-albuteroL, lidocaine, lidocaine-diphenhydraMINE-Maalox 1:1:1, ondansetron, polyethylene glycol, pramoxine, sodium chloride, SUMAtriptan, traZODone, trimethobenzamide    Soniya Ruff MD

## 2025-01-24 NOTE — PROGRESS NOTES
"Recreation Therapy Note    Therapy Session  Visit Type: Follow-up visit  Session Start Time: 1600  Session End Time: 1710  Intervention Delivery: In-person  Conflict of Service: None  Number of family members present: 0  Family Present for Session: None  Family Participation: None  Number of staff members present: 1    Pre-assessment  Mood/Affect: Appropriate, Calm  Verbalized Emotional State: Sadness (Patient was concened about a procedure she was having tomorrow.)    Treatment  Areas of Focus: Coping, Socialization, Normalization, Self-expression, Stress reduction  Co-Treatment:  (none)  Interruption: No  Patient Fell Asleep at End of Session: No    Post-assessment  Mood/Affect: Appropriate, Calm, Cooperative, Participative  Verbalized Emotional State:  (none)  Continue Visiting: Yes  Total Session Time (min): 70 minutes    Narrative  Assessment Detail: Patient was resting in bed watching a tv show.  Plan: To encourage the exploration of safe and effective positive leisure coping skills to manage situational stressors.  Intervention: Patient chose to play two cognitive games.  Evaluation: Patient was pleasant, social and invested in the session.  The doctors came in during our session and explained tomorrows procedure.  Patient was markedly relieved after they had gone stating that \"now she understood what was happening.\"  Follow-up: Will continue to encourage the exploration of positive leisure coping skills.  Patient Comments: See above.      "

## 2025-01-24 NOTE — NURSING NOTE
Patient alert and oriented x4. She is calm and cooperative to care. On room air, o2 saturation maintained above 92%. No complains of difficulty breathing or shortness of breath. Patient denies any chest pain, chest heaviness, dizziness or lightheadedness.     1/23/25 2100 - team at bedside to attempt to insert an IV access to the patient since pt will have an IV pre-medication in the morning prior to AVF procedure. Per IV team, patient is a very hard stick and IV insertion attempt failed.    1/23/25 2200 - Night hospitalist notified that patient does not have an IV access. And inquired if can do PO instead of IV. Per provider, will review and update orders.    1/24/25 - See provider orders    1/24/25 0700- during rounds and bedside report with day shift RN, Patient is awake and alert. No complains made. Not in any distress, Still awaiting final schedule for AVF procedure.

## 2025-01-24 NOTE — CARE PLAN
Problem: Discharge Planning  Goal: Discharge to home or other facility with appropriate resources  Outcome: Progressing     Problem: Diabetes  Goal: Achieve decreasing blood glucose levels by end of shift  Outcome: Progressing  Goal: Increase stability of blood glucose readings by end of shift  Outcome: Progressing  Goal: Maintain electrolyte levels within acceptable range throughout shift  Outcome: Progressing   The patient's goals for the shift include      The clinical goals for the shift include pt will remain hemodynamically stable

## 2025-01-24 NOTE — CARE PLAN
The patient's goals for the shift include will have procedure to fistula done today    The clinical goals for the shift include Pt will have iprocedural ntervention by end of shift    Problem: Discharge Planning  Goal: Discharge to home or other facility with appropriate resources  Outcome: Progressing     Problem: Diabetes  Goal: Achieve decreasing blood glucose levels by end of shift  Outcome: Progressing  Goal: Increase stability of blood glucose readings by end of shift  Outcome: Progressing  Goal: Maintain electrolyte levels within acceptable range throughout shift  Outcome: Progressing  Goal: Maintain glucose levels >70mg/dl to <250mg/dl throughout shift  Outcome: Progressing  Goal: Vital signs within normal range for age by end of shift  Outcome: Progressing     Over the shift, the patient did not make progress toward the following goals.barriers to progression include elevated bp. Recommendations to address these barriers include medical management.

## 2025-01-24 NOTE — POST-PROCEDURE NOTE
Interventional Radiology Post-Procedure Note    Right Brachiocephalic AV Fistulogram, Cephalic Arch and Innominate- SVC PTA    Procedure Details:  Technically successful and uncomplicated fistulagram demonstrated market outflow stenosis at the cephalic arch and innominate vein and SVC baloon angioplasty. Interval resolution of the areas of stenosis.    Z stich at the access site.       Please see PACS for full procedural details.    Patient Tolerance: good  Complications: None    Indication for procedure: The primary encounter diagnosis was Flash pulmonary edema. Diagnoses of ESRD (end stage renal disease) (Multi), Chronic deep vein thrombosis (DVT) of brachial vein of left upper extremity (Multi), Pain in left arm, Congestive heart failure, unspecified HF chronicity, unspecified heart failure type, Acute right-sided heart failure, MATHEW (dyspnea on exertion), Acute diastolic CHF (congestive heart failure), Encounter for screening for cardiovascular disorders, ESRD (end stage renal disease) on dialysis (Multi), Resistant hypertension, and Hypertensive emergency were also pertinent to this visit.    Pre-Procedure Verification and Time Out:  · Procedure Location procedure area   · HUDDLE - Pre-procedure Verification completed   · TIME OUT - Final Verification completed immediately prior to procedure start   · DEBRIEF completed     General Information:  Date/Time of Procedure: 01/24/25 at 3:58 PM  Indication(s): Prolong bleeding after dialysis   Findings: See PACS  Resident: Colt Barragan MD   Attending: Dr. Adolfo Castillo MD  Estimated Blood Loss (mL): minimal  Specimen: No  Informed Consent: written consent obtained    Prep:  Ultrasound Guided Insertion: Yes  Large Drape, Hand Hygiene, Surgical Cap, Surgical Mask, Sterile Gown, Sterile Gloves, Glasses, and Scrubs  Patient Position: Supine  Site Prep: chlorhexidine, draped, usual sterile procedure followed    Anesthesia/Medications:  Procedural Sedation: Moderate  Sedation      Colt Barragan MD   PGY-4, Vascular & Interventional Radiology     NON-Urgent on call weekends and after hours weekdays (5pm - 5am) IR pager: [phone]  Urgent & emergent on call weekends and after hours weekdays (5pm-7am) IR pager: [phone]

## 2025-01-25 ENCOUNTER — APPOINTMENT (OUTPATIENT)
Dept: DIALYSIS | Facility: HOSPITAL | Age: 55
End: 2025-01-25
Payer: COMMERCIAL

## 2025-01-25 LAB
ALBUMIN SERPL BCP-MCNC: 3.9 G/DL (ref 3.4–5)
ANION GAP SERPL CALC-SCNC: 20 MMOL/L (ref 10–20)
BASOPHILS # BLD AUTO: 0.02 X10*3/UL (ref 0–0.1)
BASOPHILS NFR BLD AUTO: 0.2 %
BUN SERPL-MCNC: 56 MG/DL (ref 6–23)
CALCIUM SERPL-MCNC: 10.3 MG/DL (ref 8.6–10.6)
CHLORIDE SERPL-SCNC: 93 MMOL/L (ref 98–107)
CO2 SERPL-SCNC: 28 MMOL/L (ref 21–32)
CREAT SERPL-MCNC: 7.7 MG/DL (ref 0.5–1.05)
EGFRCR SERPLBLD CKD-EPI 2021: 6 ML/MIN/1.73M*2
EOSINOPHIL # BLD AUTO: 0.01 X10*3/UL (ref 0–0.7)
EOSINOPHIL NFR BLD AUTO: 0.1 %
ERYTHROCYTE [DISTWIDTH] IN BLOOD BY AUTOMATED COUNT: 15.1 % (ref 11.5–14.5)
GLUCOSE BLD MANUAL STRIP-MCNC: 119 MG/DL (ref 74–99)
GLUCOSE BLD MANUAL STRIP-MCNC: 196 MG/DL (ref 74–99)
GLUCOSE BLD MANUAL STRIP-MCNC: 297 MG/DL (ref 74–99)
GLUCOSE BLD MANUAL STRIP-MCNC: 350 MG/DL (ref 74–99)
GLUCOSE SERPL-MCNC: 85 MG/DL (ref 74–99)
HCT VFR BLD AUTO: 36.2 % (ref 36–46)
HGB BLD-MCNC: 10.9 G/DL (ref 12–16)
IMM GRANULOCYTES # BLD AUTO: 0.08 X10*3/UL (ref 0–0.7)
IMM GRANULOCYTES NFR BLD AUTO: 0.9 % (ref 0–0.9)
LYMPHOCYTES # BLD AUTO: 1.15 X10*3/UL (ref 1.2–4.8)
LYMPHOCYTES NFR BLD AUTO: 12.7 %
MCH RBC QN AUTO: 28.8 PG (ref 26–34)
MCHC RBC AUTO-ENTMCNC: 30.1 G/DL (ref 32–36)
MCV RBC AUTO: 96 FL (ref 80–100)
MONOCYTES # BLD AUTO: 0.8 X10*3/UL (ref 0.1–1)
MONOCYTES NFR BLD AUTO: 8.8 %
NEUTROPHILS # BLD AUTO: 7 X10*3/UL (ref 1.2–7.7)
NEUTROPHILS NFR BLD AUTO: 77.3 %
NRBC BLD-RTO: 0 /100 WBCS (ref 0–0)
PHOSPHATE SERPL-MCNC: 4.6 MG/DL (ref 2.5–4.9)
PLATELET # BLD AUTO: 254 X10*3/UL (ref 150–450)
POTASSIUM SERPL-SCNC: 5.2 MMOL/L (ref 3.5–5.3)
RBC # BLD AUTO: 3.78 X10*6/UL (ref 4–5.2)
SODIUM SERPL-SCNC: 136 MMOL/L (ref 136–145)
WBC # BLD AUTO: 9.1 X10*3/UL (ref 4.4–11.3)

## 2025-01-25 PROCEDURE — 82947 ASSAY GLUCOSE BLOOD QUANT: CPT

## 2025-01-25 PROCEDURE — 2500000004 HC RX 250 GENERAL PHARMACY W/ HCPCS (ALT 636 FOR OP/ED): Performed by: STUDENT IN AN ORGANIZED HEALTH CARE EDUCATION/TRAINING PROGRAM

## 2025-01-25 PROCEDURE — 2500000001 HC RX 250 WO HCPCS SELF ADMINISTERED DRUGS (ALT 637 FOR MEDICARE OP): Performed by: STUDENT IN AN ORGANIZED HEALTH CARE EDUCATION/TRAINING PROGRAM

## 2025-01-25 PROCEDURE — 36415 COLL VENOUS BLD VENIPUNCTURE: CPT | Performed by: STUDENT IN AN ORGANIZED HEALTH CARE EDUCATION/TRAINING PROGRAM

## 2025-01-25 PROCEDURE — 8010000001 HC DIALYSIS - HEMODIALYSIS PER DAY

## 2025-01-25 PROCEDURE — 94640 AIRWAY INHALATION TREATMENT: CPT

## 2025-01-25 PROCEDURE — 80069 RENAL FUNCTION PANEL: CPT | Performed by: STUDENT IN AN ORGANIZED HEALTH CARE EDUCATION/TRAINING PROGRAM

## 2025-01-25 PROCEDURE — 2500000002 HC RX 250 W HCPCS SELF ADMINISTERED DRUGS (ALT 637 FOR MEDICARE OP, ALT 636 FOR OP/ED): Performed by: STUDENT IN AN ORGANIZED HEALTH CARE EDUCATION/TRAINING PROGRAM

## 2025-01-25 PROCEDURE — 6350000001 HC RX 635 EPOETIN >10,000 UNITS: Mod: JZ,TB | Performed by: NURSE PRACTITIONER

## 2025-01-25 PROCEDURE — 1200000002 HC GENERAL ROOM WITH TELEMETRY DAILY

## 2025-01-25 PROCEDURE — 85025 COMPLETE CBC W/AUTO DIFF WBC: CPT | Performed by: STUDENT IN AN ORGANIZED HEALTH CARE EDUCATION/TRAINING PROGRAM

## 2025-01-25 RX ORDER — INSULIN LISPRO 100 [IU]/ML
8 INJECTION, SOLUTION INTRAVENOUS; SUBCUTANEOUS ONCE
Status: COMPLETED | OUTPATIENT
Start: 2025-01-25 | End: 2025-01-25

## 2025-01-25 RX ADMIN — ACETAMINOPHEN 975 MG: 325 TABLET, FILM COATED ORAL at 17:59

## 2025-01-25 RX ADMIN — PETROLATUM: 420 OINTMENT TOPICAL at 08:05

## 2025-01-25 RX ADMIN — CARVEDILOL 25 MG: 12.5 TABLET, FILM COATED ORAL at 21:00

## 2025-01-25 RX ADMIN — HYDRALAZINE HYDROCHLORIDE 100 MG: 50 TABLET ORAL at 09:14

## 2025-01-25 RX ADMIN — INSULIN LISPRO 2 UNITS: 100 INJECTION, SOLUTION INTRAVENOUS; SUBCUTANEOUS at 18:50

## 2025-01-25 RX ADMIN — CEPHALEXIN 500 MG: 500 CAPSULE ORAL at 21:00

## 2025-01-25 RX ADMIN — CEPHALEXIN 500 MG: 500 CAPSULE ORAL at 09:14

## 2025-01-25 RX ADMIN — VALSARTAN 320 MG: 160 TABLET, FILM COATED ORAL at 21:00

## 2025-01-25 RX ADMIN — METOCLOPRAMIDE 10 MG: 10 TABLET ORAL at 01:16

## 2025-01-25 RX ADMIN — INSULIN LISPRO 8 UNITS: 100 INJECTION, SOLUTION INTRAVENOUS; SUBCUTANEOUS at 01:13

## 2025-01-25 RX ADMIN — INSULIN LISPRO 6 UNITS: 100 INJECTION, SOLUTION INTRAVENOUS; SUBCUTANEOUS at 10:00

## 2025-01-25 RX ADMIN — Medication: at 08:05

## 2025-01-25 RX ADMIN — CLONIDINE HYDROCHLORIDE 0.1 MG: 0.1 TABLET ORAL at 00:06

## 2025-01-25 RX ADMIN — SEVELAMER CARBONATE 800 MG: 800 TABLET, FILM COATED ORAL at 17:59

## 2025-01-25 RX ADMIN — DILTIAZEM HYDROCHLORIDE 360 MG: 180 CAPSULE, COATED, EXTENDED RELEASE ORAL at 09:17

## 2025-01-25 RX ADMIN — FLUTICASONE FUROATE AND VILANTEROL TRIFENATATE 1 PUFF: 100; 25 POWDER RESPIRATORY (INHALATION) at 08:50

## 2025-01-25 RX ADMIN — CLONIDINE HYDROCHLORIDE 0.1 MG: 0.1 TABLET ORAL at 09:13

## 2025-01-25 RX ADMIN — HYDRALAZINE HYDROCHLORIDE 100 MG: 50 TABLET ORAL at 00:06

## 2025-01-25 RX ADMIN — APIXABAN 5 MG: 5 TABLET, FILM COATED ORAL at 21:00

## 2025-01-25 RX ADMIN — CLONIDINE HYDROCHLORIDE 0.1 MG: 0.1 TABLET ORAL at 21:00

## 2025-01-25 RX ADMIN — ATORVASTATIN CALCIUM 80 MG: 80 TABLET, FILM COATED ORAL at 21:00

## 2025-01-25 RX ADMIN — RENO CAPS 1 CAPSULE: 100; 1.5; 1.7; 20; 10; 1; 150; 5; 6 CAPSULE ORAL at 08:02

## 2025-01-25 RX ADMIN — EPOETIN ALFA-EPBX 10000 UNITS: 10000 INJECTION, SOLUTION INTRAVENOUS; SUBCUTANEOUS at 18:01

## 2025-01-25 RX ADMIN — GABAPENTIN 300 MG: 300 CAPSULE ORAL at 21:00

## 2025-01-25 RX ADMIN — HYDRALAZINE HYDROCHLORIDE 100 MG: 50 TABLET ORAL at 17:59

## 2025-01-25 RX ADMIN — TRAZODONE HYDROCHLORIDE 50 MG: 50 TABLET ORAL at 21:05

## 2025-01-25 RX ADMIN — FLUTICASONE PROPIONATE 2 SPRAY: 50 SPRAY, METERED NASAL at 08:05

## 2025-01-25 RX ADMIN — ASPIRIN 81 MG: 81 TABLET, COATED ORAL at 09:14

## 2025-01-25 RX ADMIN — ACETAMINOPHEN 975 MG: 325 TABLET, FILM COATED ORAL at 01:16

## 2025-01-25 RX ADMIN — METOCLOPRAMIDE 10 MG: 10 TABLET ORAL at 18:00

## 2025-01-25 RX ADMIN — PANTOPRAZOLE SODIUM 40 MG: 40 TABLET, DELAYED RELEASE ORAL at 06:23

## 2025-01-25 RX ADMIN — CARVEDILOL 25 MG: 12.5 TABLET, FILM COATED ORAL at 09:13

## 2025-01-25 RX ADMIN — CLONIDINE HYDROCHLORIDE 0.1 MG: 0.1 TABLET ORAL at 17:59

## 2025-01-25 RX ADMIN — METOCLOPRAMIDE 10 MG: 10 TABLET ORAL at 09:13

## 2025-01-25 RX ADMIN — Medication: at 21:05

## 2025-01-25 RX ADMIN — SENNOSIDES AND DOCUSATE SODIUM 1 TABLET: 50; 8.6 TABLET ORAL at 09:14

## 2025-01-25 RX ADMIN — SEVELAMER CARBONATE 800 MG: 800 TABLET, FILM COATED ORAL at 08:02

## 2025-01-25 ASSESSMENT — COGNITIVE AND FUNCTIONAL STATUS - GENERAL
MOBILITY SCORE: 24
DAILY ACTIVITIY SCORE: 24
DAILY ACTIVITIY SCORE: 24
MOBILITY SCORE: 24

## 2025-01-25 ASSESSMENT — PAIN SCALES - GENERAL: PAINLEVEL_OUTOF10: 0 - NO PAIN

## 2025-01-25 ASSESSMENT — PAIN - FUNCTIONAL ASSESSMENT
PAIN_FUNCTIONAL_ASSESSMENT: 0-10
PAIN_FUNCTIONAL_ASSESSMENT: NO/DENIES PAIN

## 2025-01-25 NOTE — NURSING NOTE
Report from Sending RN:    Report From: Bry  Recent Surgery of Procedure: No  Baseline Level of Consciousness (LOC): A+Ox3  Oxygen Use: No  Type: ra  Diabetic: Yes  Last BP Med Given Day of Dialysis: given at 9am  Last Pain Med Given: tylenol  Lab Tests to be Obtained with Dialysis: No  Blood Transfusion to be Given During Dialysis: No  Available IV Access: Yes  Medications to be Administered During Dialysis: No  Continuous IV Infusion Running: No  Restraints on Currently or in the Last 24 Hours: No  Hand-Off Communication: No acute overnight events, vss.   Dialysis Catheter Dressing: na  Last Dressing Change: na

## 2025-01-25 NOTE — CARE PLAN
Problem: Discharge Planning  Goal: Discharge to home or other facility with appropriate resources  Outcome: Progressing   The patient's goals for the shift include      The clinical goals for the shift include Pt will remain hemodynamically stable

## 2025-01-25 NOTE — PROGRESS NOTES
Assessment/Plan     Stacey Heath is a 54 y.o. female PMH significant for ESRD 2/2 on dialysis T/Th/Sat via RUE AVF (last complete session 01/04/2025), HTN, HFpEF, COPD, brachial DVT on Eliquis initially managed for htn emergency and pulm edema in micu. Has been significantly above dry weight. Have been reducing her clonidine for while removing volume. She developed left breast cellulitis improved on keflex.         1/24  -s/p baloon angioplastieted at the cephalic arch, distal portion of the stent was stenosed with the Proximal SVC. Resolution of stensios after baloon angioplasty. Requested a follow up with IR for diagnostic angiogram (diagnostic). -stiches removal by Monday.        -Renal planning for hd tomorrow  -HDS, addressed all concerns           1/23    -Renal team recommended to hold Torsemide indefinitely, concerning for pancreatitis.  -IR consulted for fistulagram. RUE fistula keeps bleeding. Held Eliquis and ASA  -Appreciate palliative team help. PT states she will pay more attention to her diet and fluid intake. Reports she's been taking her medications.  She'll follow recommendations to get better. Educated about fluid restriction.  -No BM yet, abdominal pain resolved            1/21  - Breast cellulitis (swalling/warmth tenderness), improving galen Keflex.  - Renal is considering PD in the future as a more stable treatment.  - Palliative care involved   - Need to give healthy at home on dc, home care, maximize support  - She developed left breast cellulitis improved on keflex. Cf 10 days.  - She is now having continued diffiuculty moving her bowels    - Tolerating PO well. Advanced to Renal regular diet    - Continuing to try to get down to 50kg.  concerned she will drop her BP given her small heart cLVH/HFpEF, and have been weaning down the antihypertensives. Had some rebound        -------------------------------    Hypertensive Emergency - resolved  HFpEF, acute on chronic, resolved  - weaned off  cardene drip  - still with elevated blood pressures, better controlled.   - TTE 01/03/2025 showed EF of 50-55 with left atrium severely dilated,      Mod-severe mitral annular calcification. Severely increased concentric systolic function. Dialyzing until closer to dry weight  - concern for hypotension given small LV size if pt becomes fluid depleted. Currently better controlled. Will start q4h vitals, pt is off tele now. Changed notificaiton order parameter to notify if SBP <120 to monitor for signs of volume depletion.  - bp low in hd yesterday, normal on floor.   - weaning bp meds as she is more euvolemic. Decreased clonidine to 0.1 tid 1/18, decreased to Bid dosing 1/19. Plan to attempt to wean off. Hold bp meds prior to HD to allow room for fluid removal.    Abd discomfort  Nausea/vomitting  constipation  - check kub- pending read, appears to have a large stool burden, no dilated bowel.   - added ppi  -reports last bm yesterday, passing gas  - increase miralax to bid and make senna/doc 1 tab bid. Pt is incrasing activity. Developed some n/v, on zofran and reglan.  - Avoiding opioids for now which could worsen consitpation  - despite bowel regimen and half a golytely bowel prep still continuing.   - repeat kub showing stool has moved thorough colon, concern for developing ileus now. Pt has no stool in rectal vault on xray, awaiting CT abdomen/pelvis.   - of note pt has history of gastroparesis as well. She is maximized on reglan per her renal function.     R breast swelling  - with warm, edema and ttp, no erythema or drainage  - starting keflex.   - examination with nursing chaperone.   - monitor for response in next 24-48hrs. No fevers, no wbc elevation  - reports anxiety due to h/o breast cancers in family. Counselling provided.she reports she is due for screening. Plan to obtain in op setting.   - improving.     ESRD   - Dialysis T/Th/Sat   - continue HD/UF until closer to dry weight of 50kg per nephrology,  "currently 57kg after HD.   - may need bp dose reductions as closer to dry weight  -  hold hydralazine and clonidine the morning of Dialysis.    - Concern about dietary and fluid adherance.   - hold bp meds prior to hd    Chronic deep vein thrombosis (DVT) of brachial vein of left upper extremity   - Continue with Eliquis     Headache likely due to migraine vs Hypertensive emergency  - improved    COPD   -Continue Albuterol neb q6hr prn     Insomnia   - Melatonin and trazodone prn    Acute on chronic anemia   - in setting of esrd  - no bleeding noted  - monitor    DMII with gastroparesis  - continue reglan and zofran prn  - continue bowel regimen  - no longer on medications for dm in setting of her esrd  - pt on reglan.     Continuing goals of care, consulted palliative care. Nephrology had goc meeting with behavioral jonah and would like to involve palliative in goc.  Pt appears to have better understanding of her condition since the meeting.         70 min      Scheduled outpatient appointments in system:   Future Appointments   Date Time Provider Department Center   1/25/2025 12:30 PM HD CHAIR 7 [facility]   1/27/2025  4:00 PM Makenna Barraza MD [id number] [facility]   3/25/2025  1:20 PM Nate Hyman MD [id number] [facility]   3/28/2025  2:30 PM Sherri Gastelum MD [id number] None     ---------------------------------------------------------------------------------------------------  Subjective   No events.    ---------------------------------------------------------------------------------------------------  Objective   Last Recorded Vitals  Blood pressure 134/61, pulse 84, temperature 36.3 °C (97.3 °F), resp. rate 18, height 1.397 m (4' 7\"), weight 54.3 kg (119 lb 11.4 oz), SpO2 95%.  Intake/Output last 3 Shifts:  I/O last 3 completed shifts:  In: 1090 (20.1 mL/kg) [P.O.:250; I.V.:800 (14.7 mL/kg); Other:40]  Out: 2603 (47.9 mL/kg) [Other:2603]  Weight: 54.3 kg     Physical Exam  Vitals and " nursing note reviewed.   Constitutional:       General: She is not in acute distress.     Appearance: Normal appearance. She is obese.   HENT:      Head: Normocephalic and atraumatic.      Mouth/Throat:      Mouth: Mucous membranes are moist.   Eyes:      General: No scleral icterus.     Extraocular Movements: Extraocular movements intact.      Conjunctiva/sclera: Conjunctivae normal.   Cardiovascular:      Rate and Rhythm: Normal rate and regular rhythm.      Heart sounds: S1 normal and S2 normal. No murmur heard.  Pulmonary:      Effort: Pulmonary effort is normal. No respiratory distress.      Breath sounds: No wheezing, rhonchi or rales.      Comments: Diminished b/l  Abdominal:      General: Bowel sounds are normal. There is no distension.      Palpations: Abdomen is soft.      Tenderness: There is no abdominal tenderness. There is no guarding or rebound.   Musculoskeletal:         General: No swelling or deformity.      Cervical back: Neck supple.      Comments: R breast swelling, ttp, warmth noted.  no erythema or drainage   Skin:     General: Skin is warm and dry.      Findings: No rash.   Neurological:      General: No focal deficit present.      Mental Status: She is alert and oriented to person, place, and time. Mental status is at baseline.      Sensory: No sensory deficit.      Motor: No weakness.   Psychiatric:         Mood and Affect: Mood normal.         Behavior: Behavior normal.         Relevant Results  Lab Results   Component Value Date    WBC 5.9 01/24/2025    HGB 11.4 (L) 01/24/2025    HCT 37.5 01/24/2025    MCV 96 01/24/2025     01/24/2025      Lab Results   Component Value Date    GLUCOSE 162 (H) 01/24/2025    CALCIUM 10.5 01/24/2025     01/24/2025    K 5.4 (H) 01/24/2025    CO2 25 01/24/2025    CL 95 (L) 01/24/2025    BUN 33 (H) 01/24/2025    CREATININE 5.66 (H) 01/24/2025     Scheduled medications  acetaminophen, 975 mg, oral, q8h  ammonium lactate, , Topical, BID  [Held by  provider] apixaban, 5 mg, oral, BID  aspirin, 81 mg, oral, Daily  atorvastatin, 80 mg, oral, Nightly  bisacodyl, 10 mg, rectal, Daily  carvedilol, 25 mg, oral, BID  cephalexin, 500 mg, oral, q12h BELEN  cloNIDine, 0.1 mg, oral, TID  dilTIAZem CD, 360 mg, oral, Daily  [START ON 1/25/2025] epoetin joceline or biosimilar, 10,000 Units, intravenous, Once per day on Tuesday Thursday Saturday  fluticasone, 2 spray, Each Nostril, Daily  fluticasone furoate-vilanteroL, 1 puff, inhalation, Daily  gabapentin, 300 mg, oral, Nightly  hydrALAZINE, 100 mg, oral, TID  insulin lispro, 0-10 Units, subcutaneous, TID AC  insulin lispro, 8 Units, subcutaneous, Once  lactulose, 300 mL, rectal, Once  lidocaine, 1 patch, transdermal, Daily  melatonin, 6 mg, oral, Daily  metoclopramide, 10 mg, oral, q8h  pantoprazole, 40 mg, oral, Daily before breakfast  perflutren lipid microspheres, 0.5-10 mL of dilution, intravenous, Once in imaging  sennosides-docusate sodium, 1 tablet, oral, BID  sevelamer carbonate, 800 mg, oral, TID  valsartan, 320 mg, oral, q PM  vitamin B complex-vitamin C-folic acid, 1 capsule, oral, Daily  white petrolatum, , Topical, BID      Continuous medications     PRN medications  PRN medications: albuterol, diclofenac sodium, dicyclomine, diphenhydrAMINE, ipratropium-albuteroL, lidocaine, lidocaine-diphenhydraMINE-Maalox 1:1:1, ondansetron, polyethylene glycol, pramoxine, sodium chloride, SUMAtriptan, traZODone, trimethobenzamide    Soniya Ruff MD

## 2025-01-25 NOTE — CARE PLAN
The patient's goals for the shift include get dialysis during the shift    The clinical goals for the shift include Pt will not have complaints of pain during the shift    Problem: Diabetes  Goal: Achieve decreasing blood glucose levels by end of shift  Outcome: Progressing  Goal: Increase stability of blood glucose readings by end of shift  Outcome: Progressing  Goal: Maintain electrolyte levels within acceptable range throughout shift  Outcome: Progressing  Goal: Maintain glucose levels >70mg/dl to <250mg/dl throughout shift  Outcome: Progressing  Goal: Vital signs within normal range for age by end of shift  Outcome: Progressing     Over the shift, the patient did not make progress toward the following goals.  Barriers to progression elevated bp. Recommendations to address these barriers include medical management.

## 2025-01-25 NOTE — NURSING NOTE
RN spoke with Arleen Nephrology NP  as nursing staff are to pass along to morning nurse and dialysis RN during nurse to nurse report that dialysis RN is to administer Epogen and sign off in the MAR as patient does not have iv access. Subcutaneous administration is not absorbed as great as getting it via iv or in dialysis so that will be the standard for this patient.  Cecilia Ryder, ESTHERN, RN

## 2025-01-25 NOTE — NURSING NOTE
Blood sugar elevated overnight. Provider notified, see orders. Blood sugar monitored closely, latest blood suagar as of 4am - 119. In the morning, during initial rounds with day shift RN, patient is awake and alert. No complains made. Not in evident signs of distress. AVF site is clean, dry and intact. No complains of numbness/tingling sensation, sensory and motor function in the right arm intact. Distal pulses present.

## 2025-01-26 VITALS
OXYGEN SATURATION: 97 % | BODY MASS INDEX: 28.7 KG/M2 | SYSTOLIC BLOOD PRESSURE: 170 MMHG | WEIGHT: 124 LBS | HEIGHT: 55 IN | TEMPERATURE: 97.5 F | DIASTOLIC BLOOD PRESSURE: 72 MMHG | RESPIRATION RATE: 17 BRPM | HEART RATE: 80 BPM

## 2025-01-26 LAB
ALBUMIN SERPL BCP-MCNC: 3.7 G/DL (ref 3.4–5)
ANION GAP SERPL CALC-SCNC: 18 MMOL/L (ref 10–20)
BUN SERPL-MCNC: 32 MG/DL (ref 6–23)
CALCIUM SERPL-MCNC: 9.7 MG/DL (ref 8.6–10.6)
CHLORIDE SERPL-SCNC: 98 MMOL/L (ref 98–107)
CO2 SERPL-SCNC: 30 MMOL/L (ref 21–32)
CREAT SERPL-MCNC: 4.77 MG/DL (ref 0.5–1.05)
EGFRCR SERPLBLD CKD-EPI 2021: 10 ML/MIN/1.73M*2
ERYTHROCYTE [DISTWIDTH] IN BLOOD BY AUTOMATED COUNT: 15.3 % (ref 11.5–14.5)
GLUCOSE BLD MANUAL STRIP-MCNC: 112 MG/DL (ref 74–99)
GLUCOSE BLD MANUAL STRIP-MCNC: 141 MG/DL (ref 74–99)
GLUCOSE BLD MANUAL STRIP-MCNC: 212 MG/DL (ref 74–99)
GLUCOSE SERPL-MCNC: 126 MG/DL (ref 74–99)
HCT VFR BLD AUTO: 38.3 % (ref 36–46)
HGB BLD-MCNC: 11.1 G/DL (ref 12–16)
MCH RBC QN AUTO: 29.1 PG (ref 26–34)
MCHC RBC AUTO-ENTMCNC: 29 G/DL (ref 32–36)
MCV RBC AUTO: 101 FL (ref 80–100)
NRBC BLD-RTO: 0.5 /100 WBCS (ref 0–0)
PHOSPHATE SERPL-MCNC: 3.8 MG/DL (ref 2.5–4.9)
PLATELET # BLD AUTO: 265 X10*3/UL (ref 150–450)
POTASSIUM SERPL-SCNC: 4.9 MMOL/L (ref 3.5–5.3)
RBC # BLD AUTO: 3.81 X10*6/UL (ref 4–5.2)
SODIUM SERPL-SCNC: 141 MMOL/L (ref 136–145)
WBC # BLD AUTO: 5.9 X10*3/UL (ref 4.4–11.3)

## 2025-01-26 PROCEDURE — 99231 SBSQ HOSP IP/OBS SF/LOW 25: CPT | Performed by: STUDENT IN AN ORGANIZED HEALTH CARE EDUCATION/TRAINING PROGRAM

## 2025-01-26 PROCEDURE — 2500000001 HC RX 250 WO HCPCS SELF ADMINISTERED DRUGS (ALT 637 FOR MEDICARE OP): Performed by: STUDENT IN AN ORGANIZED HEALTH CARE EDUCATION/TRAINING PROGRAM

## 2025-01-26 PROCEDURE — 36415 COLL VENOUS BLD VENIPUNCTURE: CPT | Performed by: STUDENT IN AN ORGANIZED HEALTH CARE EDUCATION/TRAINING PROGRAM

## 2025-01-26 PROCEDURE — 2500000004 HC RX 250 GENERAL PHARMACY W/ HCPCS (ALT 636 FOR OP/ED): Performed by: STUDENT IN AN ORGANIZED HEALTH CARE EDUCATION/TRAINING PROGRAM

## 2025-01-26 PROCEDURE — 2500000002 HC RX 250 W HCPCS SELF ADMINISTERED DRUGS (ALT 637 FOR MEDICARE OP, ALT 636 FOR OP/ED): Performed by: STUDENT IN AN ORGANIZED HEALTH CARE EDUCATION/TRAINING PROGRAM

## 2025-01-26 PROCEDURE — 82947 ASSAY GLUCOSE BLOOD QUANT: CPT

## 2025-01-26 PROCEDURE — 94640 AIRWAY INHALATION TREATMENT: CPT

## 2025-01-26 PROCEDURE — 85027 COMPLETE CBC AUTOMATED: CPT | Performed by: STUDENT IN AN ORGANIZED HEALTH CARE EDUCATION/TRAINING PROGRAM

## 2025-01-26 PROCEDURE — 80069 RENAL FUNCTION PANEL: CPT | Performed by: STUDENT IN AN ORGANIZED HEALTH CARE EDUCATION/TRAINING PROGRAM

## 2025-01-26 PROCEDURE — 1200000002 HC GENERAL ROOM WITH TELEMETRY DAILY

## 2025-01-26 PROCEDURE — 2500000005 HC RX 250 GENERAL PHARMACY W/O HCPCS: Performed by: STUDENT IN AN ORGANIZED HEALTH CARE EDUCATION/TRAINING PROGRAM

## 2025-01-26 RX ADMIN — APIXABAN 5 MG: 5 TABLET, FILM COATED ORAL at 09:12

## 2025-01-26 RX ADMIN — METOCLOPRAMIDE 10 MG: 10 TABLET ORAL at 09:10

## 2025-01-26 RX ADMIN — ASPIRIN 81 MG: 81 TABLET, COATED ORAL at 09:10

## 2025-01-26 RX ADMIN — CLONIDINE HYDROCHLORIDE 0.1 MG: 0.1 TABLET ORAL at 09:12

## 2025-01-26 RX ADMIN — IPRATROPIUM BROMIDE AND ALBUTEROL SULFATE 3 ML: .5; 3 SOLUTION RESPIRATORY (INHALATION) at 22:11

## 2025-01-26 RX ADMIN — Medication: at 21:28

## 2025-01-26 RX ADMIN — DILTIAZEM HYDROCHLORIDE 360 MG: 180 CAPSULE, COATED, EXTENDED RELEASE ORAL at 09:11

## 2025-01-26 RX ADMIN — CEPHALEXIN 500 MG: 500 CAPSULE ORAL at 09:11

## 2025-01-26 RX ADMIN — SEVELAMER CARBONATE 800 MG: 800 TABLET, FILM COATED ORAL at 12:32

## 2025-01-26 RX ADMIN — Medication: at 09:13

## 2025-01-26 RX ADMIN — FLUTICASONE FUROATE AND VILANTEROL TRIFENATATE 1 PUFF: 100; 25 POWDER RESPIRATORY (INHALATION) at 08:35

## 2025-01-26 RX ADMIN — SEVELAMER CARBONATE 800 MG: 800 TABLET, FILM COATED ORAL at 09:09

## 2025-01-26 RX ADMIN — CEPHALEXIN 500 MG: 500 CAPSULE ORAL at 21:24

## 2025-01-26 RX ADMIN — HYDRALAZINE HYDROCHLORIDE 100 MG: 50 TABLET ORAL at 10:51

## 2025-01-26 RX ADMIN — PETROLATUM: 420 OINTMENT TOPICAL at 21:28

## 2025-01-26 RX ADMIN — PANTOPRAZOLE SODIUM 40 MG: 40 TABLET, DELAYED RELEASE ORAL at 09:11

## 2025-01-26 RX ADMIN — MELATONIN 6 MG: 3 TAB ORAL at 17:17

## 2025-01-26 RX ADMIN — SEVELAMER CARBONATE 800 MG: 800 TABLET, FILM COATED ORAL at 17:15

## 2025-01-26 RX ADMIN — SENNOSIDES AND DOCUSATE SODIUM 1 TABLET: 50; 8.6 TABLET ORAL at 09:12

## 2025-01-26 RX ADMIN — RENO CAPS 1 CAPSULE: 100; 1.5; 1.7; 20; 10; 1; 150; 5; 6 CAPSULE ORAL at 09:10

## 2025-01-26 RX ADMIN — CARVEDILOL 25 MG: 12.5 TABLET, FILM COATED ORAL at 22:24

## 2025-01-26 RX ADMIN — CLONIDINE HYDROCHLORIDE 0.1 MG: 0.1 TABLET ORAL at 21:23

## 2025-01-26 RX ADMIN — ACETAMINOPHEN 975 MG: 325 TABLET, FILM COATED ORAL at 17:17

## 2025-01-26 RX ADMIN — CLONIDINE HYDROCHLORIDE 0.1 MG: 0.1 TABLET ORAL at 15:57

## 2025-01-26 RX ADMIN — FLUTICASONE PROPIONATE 2 SPRAY: 50 SPRAY, METERED NASAL at 09:12

## 2025-01-26 RX ADMIN — CARVEDILOL 25 MG: 12.5 TABLET, FILM COATED ORAL at 09:10

## 2025-01-26 RX ADMIN — GABAPENTIN 300 MG: 300 CAPSULE ORAL at 21:23

## 2025-01-26 RX ADMIN — ATORVASTATIN CALCIUM 80 MG: 80 TABLET, FILM COATED ORAL at 21:24

## 2025-01-26 RX ADMIN — INSULIN LISPRO 4 UNITS: 100 INJECTION, SOLUTION INTRAVENOUS; SUBCUTANEOUS at 12:32

## 2025-01-26 RX ADMIN — METOCLOPRAMIDE 10 MG: 10 TABLET ORAL at 17:15

## 2025-01-26 RX ADMIN — PETROLATUM: 420 OINTMENT TOPICAL at 09:13

## 2025-01-26 RX ADMIN — TRAZODONE HYDROCHLORIDE 50 MG: 50 TABLET ORAL at 21:23

## 2025-01-26 RX ADMIN — ACETAMINOPHEN 975 MG: 325 TABLET, FILM COATED ORAL at 09:09

## 2025-01-26 RX ADMIN — HYDRALAZINE HYDROCHLORIDE 100 MG: 50 TABLET ORAL at 17:15

## 2025-01-26 RX ADMIN — APIXABAN 5 MG: 5 TABLET, FILM COATED ORAL at 21:23

## 2025-01-26 ASSESSMENT — PAIN DESCRIPTION - LOCATION
LOCATION: ABDOMEN
LOCATION: ABDOMEN

## 2025-01-26 ASSESSMENT — PAIN SCALES - GENERAL
PAINLEVEL_OUTOF10: 2
PAINLEVEL_OUTOF10: 3
PAINLEVEL_OUTOF10: 0 - NO PAIN

## 2025-01-26 ASSESSMENT — PAIN - FUNCTIONAL ASSESSMENT: PAIN_FUNCTIONAL_ASSESSMENT: 0-10

## 2025-01-26 NOTE — PROGRESS NOTES
Assessment/Plan     Stacey Heath is a 54 y.o. female PMH significant for ESRD 2/2 on dialysis T/Th/Sat via RUE AVF (last complete session 01/04/2025), HTN, HFpEF, COPD, brachial DVT on Eliquis initially managed for htn emergency and pulm edema in micu. Has been significantly above dry weight. Have been reducing her clonidine for while removing volume. She developed left breast cellulitis improved on keflex.     1/26  Plan to discharge after UF Monday. Needs to remove stiches in av fistula.    HDS. Met and examined  no acute events over night.       1/24  -s/p baloon angioplastieted at the cephalic arch, distal portion of the stent was stenosed with the Proximal SVC. Resolution of stensios after baloon angioplasty. Requested a follow up with IR for diagnostic angiogram (diagnostic). -stiches removal by Monday.        -Renal planning for hd tomorrow  -HDS, addressed all concerns           1/23    -Renal team recommended to hold Torsemide indefinitely, concerning for pancreatitis.  -IR consulted for fistulagram. RUE fistula keeps bleeding. Held Eliquis and ASA  -Appreciate palliative team help. PT states she will pay more attention to her diet and fluid intake. Reports she's been taking her medications.  She'll follow recommendations to get better. Educated about fluid restriction.  -No BM yet, abdominal pain resolved            1/21  - Breast cellulitis (swalling/warmth tenderness), improving galen Keflex.  - Renal is considering PD in the future as a more stable treatment.  - Palliative care involved   - Need to give healthy at home on dc, home care, maximize support  - She developed left breast cellulitis improved on keflex. Cf 10 days.  - She is now having continued diffiuculty moving her bowels    - Tolerating PO well. Advanced to Renal regular diet    - Continuing to try to get down to 50kg.  concerned she will drop her BP given her small heart cLVH/HFpEF, and have been weaning down the antihypertensives. Had  some rebound        -------------------------------    Hypertensive Emergency - resolved  HFpEF, acute on chronic, resolved  - weaned off cardene drip  - still with elevated blood pressures, better controlled.   - TTE 01/03/2025 showed EF of 50-55 with left atrium severely dilated,      Mod-severe mitral annular calcification. Severely increased concentric systolic function. Dialyzing until closer to dry weight  - concern for hypotension given small LV size if pt becomes fluid depleted. Currently better controlled. Will start q4h vitals, pt is off tele now. Changed notificaiton order parameter to notify if SBP <120 to monitor for signs of volume depletion.  - bp low in hd yesterday, normal on floor.   - weaning bp meds as she is more euvolemic. Decreased clonidine to 0.1 tid 1/18, decreased to Bid dosing 1/19. Plan to attempt to wean off. Hold bp meds prior to HD to allow room for fluid removal.    Abd discomfort  Nausea/vomitting  constipation  - check kub- pending read, appears to have a large stool burden, no dilated bowel.   - added ppi  -reports last bm yesterday, passing gas  - increase miralax to bid and make senna/doc 1 tab bid. Pt is incrasing activity. Developed some n/v, on zofran and reglan.  - Avoiding opioids for now which could worsen consitpation  - despite bowel regimen and half a golytely bowel prep still continuing.   - repeat kub showing stool has moved thorough colon, concern for developing ileus now. Pt has no stool in rectal vault on xray, awaiting CT abdomen/pelvis.   - of note pt has history of gastroparesis as well. She is maximized on reglan per her renal function.     R breast swelling  - with warm, edema and ttp, no erythema or drainage  - starting keflex.   - examination with nursing chaperone.   - monitor for response in next 24-48hrs. No fevers, no wbc elevation  - reports anxiety due to h/o breast cancers in family. Counselling provided.she reports she is due for screening. Plan to  "obtain in op setting.   - improving.     ESRD   - Dialysis T/Th/Sat   - continue HD/UF until closer to dry weight of 50kg per nephrology, currently 57kg after HD.   - may need bp dose reductions as closer to dry weight  -  hold hydralazine and clonidine the morning of Dialysis.    - Concern about dietary and fluid adherance.   - hold bp meds prior to hd    Chronic deep vein thrombosis (DVT) of brachial vein of left upper extremity   - Continue with Eliquis     Headache likely due to migraine vs Hypertensive emergency  - improved    COPD   -Continue Albuterol neb q6hr prn     Insomnia   - Melatonin and trazodone prn    Acute on chronic anemia   - in setting of esrd  - no bleeding noted  - monitor    DMII with gastroparesis  - continue reglan and zofran prn  - continue bowel regimen  - no longer on medications for dm in setting of her esrd  - pt on reglan.     Continuing goals of care, consulted palliative care. Nephrology had goc meeting with behavioral jonah and would like to involve palliative in goc.  Pt appears to have better understanding of her condition since the meeting.         30 min      Scheduled outpatient appointments in system:   Future Appointments   Date Time Provider Department Center   1/27/2025  4:00 PM Makenna Barraza MD [id number] [facility]   3/25/2025  1:20 PM Nate Hyman MD [id number] [facility]   3/28/2025  2:30 PM Sherri Gastelum MD [id number] None     ---------------------------------------------------------------------------------------------------  Subjective   No events.    ---------------------------------------------------------------------------------------------------  Objective   Last Recorded Vitals  Blood pressure 162/74, pulse 85, temperature 35.9 °C (96.6 °F), temperature source Temporal, resp. rate 17, height 1.397 m (4' 7\"), weight 56.2 kg (124 lb), SpO2 97%.  Intake/Output last 3 Shifts:  I/O last 3 completed shifts:  In: 800 (14.1 mL/kg) [I.V.:800 (14.1 " mL/kg)]  Out: 1100 (19.4 mL/kg) [Other:1100]  Weight: 56.7 kg     Physical Exam  Vitals and nursing note reviewed.   Constitutional:       General: She is not in acute distress.     Appearance: Normal appearance. She is obese.   HENT:      Head: Normocephalic and atraumatic.      Mouth/Throat:      Mouth: Mucous membranes are moist.   Eyes:      General: No scleral icterus.     Extraocular Movements: Extraocular movements intact.      Conjunctiva/sclera: Conjunctivae normal.   Cardiovascular:      Rate and Rhythm: Normal rate and regular rhythm.      Heart sounds: S1 normal and S2 normal. No murmur heard.  Pulmonary:      Effort: Pulmonary effort is normal. No respiratory distress.      Breath sounds: No wheezing, rhonchi or rales.      Comments: Diminished b/l  Abdominal:      General: Bowel sounds are normal. There is no distension.      Palpations: Abdomen is soft.      Tenderness: There is no abdominal tenderness. There is no guarding or rebound.   Musculoskeletal:         General: No swelling or deformity.      Cervical back: Neck supple.      Comments: R breast swelling, ttp, warmth noted.  no erythema or drainage   Skin:     General: Skin is warm and dry.      Findings: No rash.   Neurological:      General: No focal deficit present.      Mental Status: She is alert and oriented to person, place, and time. Mental status is at baseline.      Sensory: No sensory deficit.      Motor: No weakness.   Psychiatric:         Mood and Affect: Mood normal.         Behavior: Behavior normal.         Relevant Results  Lab Results   Component Value Date    WBC 5.9 01/26/2025    HGB 11.1 (L) 01/26/2025    HCT 38.3 01/26/2025     (H) 01/26/2025     01/26/2025      Lab Results   Component Value Date    GLUCOSE 126 (H) 01/26/2025    CALCIUM 9.7 01/26/2025     01/26/2025    K 4.9 01/26/2025    CO2 30 01/26/2025    CL 98 01/26/2025    BUN 32 (H) 01/26/2025    CREATININE 4.77 (H) 01/26/2025     Scheduled  medications  acetaminophen, 975 mg, oral, q8h  ammonium lactate, , Topical, BID  apixaban, 5 mg, oral, BID  aspirin, 81 mg, oral, Daily  atorvastatin, 80 mg, oral, Nightly  bisacodyl, 10 mg, rectal, Daily  carvedilol, 25 mg, oral, BID  cephalexin, 500 mg, oral, q12h BELEN  cloNIDine, 0.1 mg, oral, TID  dilTIAZem CD, 360 mg, oral, Daily  epoetin joceline or biosimilar, 10,000 Units, intravenous, Once per day on Tuesday Thursday Saturday  fluticasone, 2 spray, Each Nostril, Daily  fluticasone furoate-vilanteroL, 1 puff, inhalation, Daily  gabapentin, 300 mg, oral, Nightly  hydrALAZINE, 100 mg, oral, TID  insulin lispro, 0-10 Units, subcutaneous, TID AC  lactulose, 300 mL, rectal, Once  lidocaine, 1 patch, transdermal, Daily  melatonin, 6 mg, oral, Daily  metoclopramide, 10 mg, oral, q8h  pantoprazole, 40 mg, oral, Daily before breakfast  perflutren lipid microspheres, 0.5-10 mL of dilution, intravenous, Once in imaging  sennosides-docusate sodium, 1 tablet, oral, BID  sevelamer carbonate, 800 mg, oral, TID  valsartan, 320 mg, oral, q PM  vitamin B complex-vitamin C-folic acid, 1 capsule, oral, Daily  white petrolatum, , Topical, BID      Continuous medications     PRN medications  PRN medications: albuterol, diclofenac sodium, dicyclomine, diphenhydrAMINE, ipratropium-albuteroL, lidocaine, lidocaine-diphenhydraMINE-Maalox 1:1:1, ondansetron, polyethylene glycol, pramoxine, sodium chloride, SUMAtriptan, traZODone, trimethobenzamide    Soniya Ruff MD

## 2025-01-26 NOTE — CARE PLAN
The patient's goals for the shift include      The clinical goals for the shift include Pt will not have complaints of pain during the shift. Pt remained safe and free from injury this shift. Abdominal pain has decreased last few days. Pt has large soft bowel movement this shift. BP meds spread out over shift, checking pressures frequently, rather than given all at once. SBPs ranged from 182-144.

## 2025-01-27 ENCOUNTER — APPOINTMENT (OUTPATIENT)
Dept: NEPHROLOGY | Facility: CLINIC | Age: 55
End: 2025-01-27
Payer: COMMERCIAL

## 2025-01-27 ENCOUNTER — DOCUMENTATION (OUTPATIENT)
Dept: HOME HEALTH SERVICES | Facility: HOME HEALTH | Age: 55
End: 2025-01-27

## 2025-01-27 ENCOUNTER — PHARMACY VISIT (OUTPATIENT)
Dept: PHARMACY | Facility: CLINIC | Age: 55
End: 2025-01-27
Payer: COMMERCIAL

## 2025-01-27 ENCOUNTER — APPOINTMENT (OUTPATIENT)
Dept: DIALYSIS | Facility: HOSPITAL | Age: 55
End: 2025-01-27
Payer: COMMERCIAL

## 2025-01-27 ENCOUNTER — HOME HEALTH ADMISSION (OUTPATIENT)
Dept: HOME HEALTH SERVICES | Facility: HOME HEALTH | Age: 55
End: 2025-01-27
Payer: COMMERCIAL

## 2025-01-27 VITALS
BODY MASS INDEX: 29.44 KG/M2 | HEIGHT: 55 IN | HEART RATE: 67 BPM | SYSTOLIC BLOOD PRESSURE: 190 MMHG | RESPIRATION RATE: 17 BRPM | TEMPERATURE: 95.4 F | OXYGEN SATURATION: 97 % | DIASTOLIC BLOOD PRESSURE: 81 MMHG | WEIGHT: 127.21 LBS

## 2025-01-27 LAB
ALBUMIN SERPL BCP-MCNC: 3.8 G/DL (ref 3.4–5)
ANION GAP SERPL CALC-SCNC: 21 MMOL/L (ref 10–20)
BUN SERPL-MCNC: 70 MG/DL (ref 6–23)
CALCIUM SERPL-MCNC: 9.7 MG/DL (ref 8.6–10.6)
CHLORIDE SERPL-SCNC: 99 MMOL/L (ref 98–107)
CO2 SERPL-SCNC: 24 MMOL/L (ref 21–32)
CREAT SERPL-MCNC: 8.11 MG/DL (ref 0.5–1.05)
EGFRCR SERPLBLD CKD-EPI 2021: 5 ML/MIN/1.73M*2
ERYTHROCYTE [DISTWIDTH] IN BLOOD BY AUTOMATED COUNT: 15.2 % (ref 11.5–14.5)
GLUCOSE BLD MANUAL STRIP-MCNC: 359 MG/DL (ref 74–99)
GLUCOSE SERPL-MCNC: 150 MG/DL (ref 74–99)
HCT VFR BLD AUTO: 37.8 % (ref 36–46)
HGB BLD-MCNC: 10.9 G/DL (ref 12–16)
MCH RBC QN AUTO: 29.9 PG (ref 26–34)
MCHC RBC AUTO-ENTMCNC: 28.8 G/DL (ref 32–36)
MCV RBC AUTO: 104 FL (ref 80–100)
NRBC BLD-RTO: 0.3 /100 WBCS (ref 0–0)
PHOSPHATE SERPL-MCNC: 4 MG/DL (ref 2.5–4.9)
PLATELET # BLD AUTO: 286 X10*3/UL (ref 150–450)
POTASSIUM SERPL-SCNC: 5.2 MMOL/L (ref 3.5–5.3)
RBC # BLD AUTO: 3.65 X10*6/UL (ref 4–5.2)
SODIUM SERPL-SCNC: 139 MMOL/L (ref 136–145)
WBC # BLD AUTO: 6.7 X10*3/UL (ref 4.4–11.3)

## 2025-01-27 PROCEDURE — 82947 ASSAY GLUCOSE BLOOD QUANT: CPT

## 2025-01-27 PROCEDURE — 94640 AIRWAY INHALATION TREATMENT: CPT

## 2025-01-27 PROCEDURE — 84100 ASSAY OF PHOSPHORUS: CPT | Performed by: STUDENT IN AN ORGANIZED HEALTH CARE EDUCATION/TRAINING PROGRAM

## 2025-01-27 PROCEDURE — RXMED WILLOW AMBULATORY MEDICATION CHARGE

## 2025-01-27 PROCEDURE — 36415 COLL VENOUS BLD VENIPUNCTURE: CPT | Performed by: STUDENT IN AN ORGANIZED HEALTH CARE EDUCATION/TRAINING PROGRAM

## 2025-01-27 PROCEDURE — 2500000001 HC RX 250 WO HCPCS SELF ADMINISTERED DRUGS (ALT 637 FOR MEDICARE OP): Performed by: STUDENT IN AN ORGANIZED HEALTH CARE EDUCATION/TRAINING PROGRAM

## 2025-01-27 PROCEDURE — 85027 COMPLETE CBC AUTOMATED: CPT | Performed by: STUDENT IN AN ORGANIZED HEALTH CARE EDUCATION/TRAINING PROGRAM

## 2025-01-27 PROCEDURE — 8010000001 HC DIALYSIS - HEMODIALYSIS PER DAY

## 2025-01-27 PROCEDURE — 99239 HOSP IP/OBS DSCHRG MGMT >30: CPT | Performed by: STUDENT IN AN ORGANIZED HEALTH CARE EDUCATION/TRAINING PROGRAM

## 2025-01-27 RX ORDER — SEVELAMER CARBONATE 800 MG/1
800 TABLET, FILM COATED ORAL
Qty: 90 TABLET | Refills: 0 | Status: SHIPPED | OUTPATIENT
Start: 2025-01-27 | End: 2025-02-26

## 2025-01-27 RX ORDER — DICYCLOMINE HYDROCHLORIDE 10 MG/1
10 CAPSULE ORAL 2 TIMES DAILY PRN
Qty: 20 CAPSULE | Refills: 0 | Status: SHIPPED | OUTPATIENT
Start: 2025-01-27

## 2025-01-27 RX ORDER — GABAPENTIN 300 MG/1
300 CAPSULE ORAL NIGHTLY
Qty: 30 CAPSULE | Refills: 0 | Status: SHIPPED | OUTPATIENT
Start: 2025-01-27 | End: 2025-02-26

## 2025-01-27 RX ORDER — AMOXICILLIN 250 MG
1 CAPSULE ORAL 2 TIMES DAILY
Qty: 60 TABLET | Refills: 0 | Status: SHIPPED | OUTPATIENT
Start: 2025-01-27 | End: 2025-02-26

## 2025-01-27 RX ORDER — ALBUTEROL SULFATE 90 UG/1
2 INHALANT RESPIRATORY (INHALATION) EVERY 4 HOURS PRN
Qty: 18 G | Refills: 5 | Status: SHIPPED | OUTPATIENT
Start: 2025-01-27

## 2025-01-27 RX ORDER — POLYETHYLENE GLYCOL 3350 17 G/17G
17 POWDER, FOR SOLUTION ORAL DAILY PRN
Start: 2025-01-27

## 2025-01-27 RX ORDER — CLONIDINE HYDROCHLORIDE 0.1 MG/1
0.2 TABLET ORAL NIGHTLY
Qty: 60 TABLET | Refills: 0 | Status: SHIPPED | OUTPATIENT
Start: 2025-01-27 | End: 2025-02-26

## 2025-01-27 RX ORDER — DILTIAZEM HYDROCHLORIDE 360 MG/1
360 CAPSULE, EXTENDED RELEASE ORAL DAILY
Qty: 30 CAPSULE | Refills: 0 | Status: SHIPPED | OUTPATIENT
Start: 2025-01-28 | End: 2025-02-27

## 2025-01-27 RX ORDER — DIPHENHYDRAMINE HCL 25 MG
50 CAPSULE ORAL EVERY 6 HOURS PRN
Qty: 20 CAPSULE | Refills: 0 | Status: SHIPPED | OUTPATIENT
Start: 2025-01-27

## 2025-01-27 RX ORDER — PANTOPRAZOLE SODIUM 40 MG/1
40 TABLET, DELAYED RELEASE ORAL AS NEEDED
Qty: 30 TABLET | Refills: 0 | Status: SHIPPED | OUTPATIENT
Start: 2025-01-27 | End: 2025-02-26

## 2025-01-27 RX ORDER — CALCIUM ACETATE 667 MG/1
1334 CAPSULE ORAL
Qty: 180 CAPSULE | Refills: 0 | Status: SHIPPED | OUTPATIENT
Start: 2025-01-27 | End: 2025-01-27 | Stop reason: HOSPADM

## 2025-01-27 RX ORDER — HYDRALAZINE HYDROCHLORIDE 100 MG/1
100 TABLET, FILM COATED ORAL 3 TIMES DAILY
Qty: 90 TABLET | Refills: 0 | Status: SHIPPED | OUTPATIENT
Start: 2025-01-27 | End: 2025-02-26

## 2025-01-27 RX ORDER — VALSARTAN 320 MG/1
320 TABLET ORAL EVERY EVENING
Qty: 30 TABLET | Refills: 0 | Status: SHIPPED | OUTPATIENT
Start: 2025-01-27 | End: 2025-02-26

## 2025-01-27 RX ORDER — DICLOFENAC SODIUM 10 MG/G
4 GEL TOPICAL 4 TIMES DAILY PRN
Qty: 200 G | Refills: 0 | Status: SHIPPED | OUTPATIENT
Start: 2025-01-27

## 2025-01-27 RX ORDER — ACETAMINOPHEN 325 MG/1
650 TABLET ORAL EVERY 8 HOURS PRN
Qty: 30 TABLET | Refills: 0 | Status: SHIPPED | OUTPATIENT
Start: 2025-01-27

## 2025-01-27 RX ORDER — ASPIRIN 81 MG/1
81 TABLET ORAL DAILY
Qty: 30 TABLET | Refills: 0 | Status: SHIPPED | OUTPATIENT
Start: 2025-01-27 | End: 2025-02-26

## 2025-01-27 RX ORDER — BISACODYL 10 MG/1
10 SUPPOSITORY RECTAL DAILY PRN
Qty: 10 SUPPOSITORY | Refills: 0 | Status: SHIPPED | OUTPATIENT
Start: 2025-01-27

## 2025-01-27 RX ORDER — CARVEDILOL 25 MG/1
25 TABLET ORAL 2 TIMES DAILY
Qty: 60 TABLET | Refills: 0 | Status: SHIPPED | OUTPATIENT
Start: 2025-01-27 | End: 2025-02-26

## 2025-01-27 RX ADMIN — ACETAMINOPHEN 975 MG: 325 TABLET, FILM COATED ORAL at 01:32

## 2025-01-27 RX ADMIN — APIXABAN 5 MG: 5 TABLET, FILM COATED ORAL at 09:12

## 2025-01-27 RX ADMIN — ACETAMINOPHEN 975 MG: 325 TABLET, FILM COATED ORAL at 09:12

## 2025-01-27 RX ADMIN — FLUTICASONE PROPIONATE 2 SPRAY: 50 SPRAY, METERED NASAL at 09:17

## 2025-01-27 RX ADMIN — DILTIAZEM HYDROCHLORIDE 360 MG: 180 CAPSULE, COATED, EXTENDED RELEASE ORAL at 09:13

## 2025-01-27 RX ADMIN — CEPHALEXIN 500 MG: 500 CAPSULE ORAL at 09:13

## 2025-01-27 RX ADMIN — ASPIRIN 81 MG: 81 TABLET, COATED ORAL at 09:13

## 2025-01-27 RX ADMIN — METOCLOPRAMIDE 10 MG: 10 TABLET ORAL at 09:14

## 2025-01-27 RX ADMIN — CLONIDINE HYDROCHLORIDE 0.1 MG: 0.1 TABLET ORAL at 09:12

## 2025-01-27 RX ADMIN — RENO CAPS 1 CAPSULE: 100; 1.5; 1.7; 20; 10; 1; 150; 5; 6 CAPSULE ORAL at 09:12

## 2025-01-27 RX ADMIN — PETROLATUM: 420 OINTMENT TOPICAL at 09:17

## 2025-01-27 RX ADMIN — METOCLOPRAMIDE 10 MG: 10 TABLET ORAL at 01:32

## 2025-01-27 RX ADMIN — CARVEDILOL 25 MG: 12.5 TABLET, FILM COATED ORAL at 09:12

## 2025-01-27 RX ADMIN — FLUTICASONE FUROATE AND VILANTEROL TRIFENATATE 1 PUFF: 100; 25 POWDER RESPIRATORY (INHALATION) at 08:55

## 2025-01-27 RX ADMIN — Medication: at 09:17

## 2025-01-27 ASSESSMENT — PAIN DESCRIPTION - LOCATION
LOCATION: ARM
LOCATION: ARM

## 2025-01-27 ASSESSMENT — COGNITIVE AND FUNCTIONAL STATUS - GENERAL
DAILY ACTIVITIY SCORE: 24
MOBILITY SCORE: 24

## 2025-01-27 ASSESSMENT — PAIN SCALES - GENERAL
PAINLEVEL_OUTOF10: 3
PAINLEVEL_OUTOF10: 0 - NO PAIN
PAINLEVEL_OUTOF10: 3
PAINLEVEL_OUTOF10: 3
PAINLEVEL_OUTOF10: 0 - NO PAIN
PAINLEVEL_OUTOF10: 0 - NO PAIN

## 2025-01-27 ASSESSMENT — PAIN SCALES - WONG BAKER: WONGBAKER_NUMERICALRESPONSE: HURTS LITTLE BIT

## 2025-01-27 ASSESSMENT — PAIN DESCRIPTION - ORIENTATION: ORIENTATION: RIGHT

## 2025-01-27 NOTE — NURSING NOTE
Discharge note  Patient received discharge instructions and remote RN went over with patient via phone call. Patient states she has no questions or concerns. Patient notified to follow up with home health care and IR. Patient dressed herself. Patient transport request will take patient to [facility] where her ride will pick her up to take her home. IV removed intact. No belongings to return. Patient ambulating down to [facility] to  prescriptions. Refusing patient transport.  Cecilia Ryder, ESTHERN, RN

## 2025-01-27 NOTE — CARE PLAN
Problem: Diabetes  Goal: Achieve decreasing blood glucose levels by end of shift  Outcome: Progressing     Problem: Diabetes  Goal: Increase stability of blood glucose readings by end of shift  Outcome: Progressing     Problem: Diabetes  Goal: Vital signs within normal range for age by end of shift  Outcome: Progressing   The patient's goals for the shift include      The clinical goals for the shift include Patient pain will be managed with low pain scale during shift

## 2025-01-27 NOTE — DISCHARGE SUMMARY
Discharge Diagnosis  Hypertensive emergency    Issues Requiring Follow-Up  PCP and renal clinic     Discharge Meds     Medication List      START taking these medications     bisacodyl 10 mg suppository; Commonly known as: Dulcolax; Insert 1   suppository (10 mg) into the rectum once daily as needed for constipation.   dicyclomine 10 mg capsule; Commonly known as: Bentyl; Take 1 capsule (10   mg) by mouth 2 times a day as needed (Abdominal cramps).   dilTIAZem  mg 24 hr capsule; Commonly known as: Cardizem CD; Take   1 capsule (360 mg) by mouth once daily.; Start taking on: January 28, 2025   diphenhydrAMINE 25 mg capsule; Commonly known as: BENADryl; Take 2   capsules (50 mg) by mouth every 6 hours if needed for itching.   hydrALAZINE 100 mg tablet; Commonly known as: Apresoline; Take 1 tablet   (100 mg) by mouth 3 times a day. hold hydralazine and clonidine the   morning of Dialysis.   sennosides-docusate sodium 8.6-50 mg tablet; Commonly known as:   Jesika-Colace; Take 1 tablet by mouth 2 times a day.   sevelamer carbonate 800 mg tablet; Commonly known as: Renvela; Take 1   tablet (800 mg) by mouth 3 times daily (morning, midday, late afternoon).   Swallow tablet whole; do not crush, break, or chew.     CHANGE how you take these medications     acetaminophen 325 mg tablet; Commonly known as: Tylenol; Take 2 tablets   (650 mg) by mouth every 8 hours if needed for headaches, mild pain (1 - 3)   or fever (temp greater than 38.0 C).; What changed: when to take this,   reasons to take this   cloNIDine 0.1 mg tablet; Commonly known as: Catapres; Take 2 tablets   (0.2 mg) by mouth once daily at bedtime. -  hold hydralazine and clonidine   the morning of Dialysis.; What changed: additional instructions   fluticasone 50 mcg/actuation nasal spray; Commonly known as: Flonase;   Administer 2 sprays into each nostril once daily. Shake gently. Before   first use, prime pump. After use, clean tip and replace cap.    gabapentin 300 mg capsule; Commonly known as: Neurontin; Take 1 capsule   (300 mg) by mouth once daily at bedtime.; What changed: when to take this,   reasons to take this   polyethylene glycol 17 gram/dose powder; Commonly known as: Glycolax,   Miralax; Mix 17 g of powder and drink once daily as needed for   constipation.; What changed: when to take this, reasons to take this     CONTINUE taking these medications     * albuterol 1.25 mg/3 mL nebulizer solution; Take 3 mL (1.25 mg) by   nebulization every 6 hours if needed for wheezing or shortness of breath.   * albuterol 90 mcg/actuation inhaler; Commonly known as: ProAir HFA;   Inhale 2 puffs every 4 hours if needed for wheezing or shortness of   breath.   apixaban 5 mg tablet; Commonly known as: Eliquis; Take 1 tablet (5 mg)   by mouth 2 times a day.   aspirin 81 mg EC tablet; Take 1 tablet (81 mg) by mouth once daily.   atorvastatin 80 mg tablet; Commonly known as: Lipitor; Take 1 tablet (80   mg) by mouth once daily at bedtime.   Breo Ellipta 100-25 mcg/dose inhaler; Generic drug: fluticasone   furoate-vilanteroL; Inhale 1 puff once daily.   calcium acetate 667 mg capsule; Commonly known as: Phoslo; Take 2   capsules (1,334 mg) by mouth 3 times daily (morning, midday, late   afternoon).   carvedilol 25 mg tablet; Commonly known as: Coreg; Take 1 tablet (25 mg)   by mouth 2 times a day.   diclofenac sodium 1 % gel; Commonly known as: Voltaren; Apply 4.5 inches   (4 g) topically 4 times a day as needed (back pain).   epoetin joceline 10,000 unit/mL injection; Commonly known as: Procrit;   Inject 1 mL (10,000 Units) under the skin 3 times a week.   pantoprazole 40 mg EC tablet; Commonly known as: ProtoNix; Take 1 tablet   (40 mg) by mouth if needed (not regular take it). Do not crush, chew, or   split.   SUMAtriptan 25 mg tablet; Commonly known as: Imitrex; Take 1 tablet (25   mg) by mouth 1 time if needed for migraine. May repeat dose once in 2   hours if no relief.   Do not exceed 2 doses in 24 hours.   valsartan 320 mg tablet; Commonly known as: Diovan; Take 1 tablet (320   mg) by mouth once daily in the evening.   vitamin B complex-vitamin C-folic acid 1 mg capsule; Commonly known as:   Nephrocaps; Take 1 capsule by mouth once daily.  * This list has 2 medication(s) that are the same as other medications   prescribed for you. Read the directions carefully, and ask your doctor or   other care provider to review them with you.     STOP taking these medications     docusate sodium 100 mg capsule; Commonly known as: Colace   Lidocaine Pain Relief 4 % patch; Generic drug: lidocaine   NIFEdipine ER 90 mg 24 hr tablet; Commonly known as: Adalat CC   torsemide 20 mg tablet; Commonly known as: Demadex       Test Results Pending At Discharge  Pending Labs       No current pending labs.            Hospital Course          Stacey Heath is a 54 y.o. female PMH significant for ESRD 2/2 on dialysis T/Th/Sat via RUE AVF (last complete session 01/04/2025), HTN, HFpEF, COPD, brachial DVT on Eliquis initially managed for htn emergency and pulm edema in micu. Has been significantly above dry weight. She developed left breast cellulitis completed course of keflex. s/p baloon angioplastieted at the cephalic arch, distal portion of the stent was stenosed with the Proximal SVC. Resolution of stensios after baloon angioplasty. Requested a follow up with IR for diagnostic angiogram (diagnostic). Stiches removed by IR team on Monday. Renal team recommended to hold Torsemide indefinitely, concerning for pancreatitis. Palliative team had seen the PT this admission. she states she will pay more attention to her diet and fluid intake. Reports she's been taking her medications.  She'll follow recommendations to get better. Educated about fluid restriction.    - Pt will fu with Renal and PC op  - Requested HHC at the time of discharge.       Hypertensive Emergency - resolved  HFpEF, acute on chronic,  resolved  Abd discomfort - resolved  Nausea/vomitting - resolved  Constipation - resolved    ESRD , hold hydralazine and clonidine the morning of Dialysis.    Chronic deep vein thrombosis (DVT) of brachial vein of left upper extremity   COPD   Insomnia   chronic anemia   DMII with gastroparesis          50 min     Pertinent Physical Exam At Time of Discharge  Physical Exam    Outpatient Follow-Up  Future Appointments   Date Time Provider Department Center   1/27/2025 11:45 AM HD CHAIR 10 [facility]   1/27/2025  4:00 PM Makenna Barraza MD [id number] Academic   3/25/2025  1:20 PM Nate Hyman MD [id number] Academic   3/28/2025  2:30 PM Sherri Gastelum MD [id number] None         Soniya Ruff MD

## 2025-01-27 NOTE — PROGRESS NOTES
Transitional Care Coordination Progress Note:  Patient discussed during interdisciplinary rounds.  Team members present: MD, AGUSTIN  Plan per Medical/Surgical team: Per attending pt is planned for discharge home today after dialysis.   Payer: [facility], [facility]  Status: Inpatient  Discharge disposition: [facility] ([facility]) for RN/LPN (assessment/med compliance), PT/OT and SW services pending SOC.   Potential Barriers: none  ADOD: 1/27  [facility] intake was notified via secure chat regarding pt's plan for discharge home today, attending placed new home care referral per their request, SOC requested and pending. Care coordinator will continue to follow for discharge planning needs.     Dorothy Ramos RN  Transitional Care Coordinator (TCC)  [phone] or [phone]

## 2025-01-27 NOTE — PROGRESS NOTES
Stacey Heath  Age: 54 y.o.  MRN: [id number]  Date: 1/22/2025  Location of service: in community    Program Details  Medicaid Community Clinical Case Management  Status: Enrolled  Effective Dates: 10/17/2023 - present  Responsible Staff: NAREN Davidson      Goals Reviewed:  Problem: Kidney Issues       Goal: Improve health to get on kidney transplant list       Priority: High        Problem: Negative Experience, Conflict with, or Distrust of Providers and/or Health System       Goal: Plan to Address Patient Specific Negative Experience, Distrust, or Conflict with Providers and/or Health System       Priority: High        Problem: Risk of Uncoordinated Care       Goal: Care will be Coordinated and Supported by a Multidisciplinary Team of Providers       Priority: High          Summary:  This writer met with Dr. Barraza and the patient in her hospital room.  Dr. Barraza discusses patient's prognosis and what needs to be done if she wants to get transplants done.  After Dr. Barraza left, patient spent time discussing her childhood.   This writer encouraged patient to consider ways she sees this writer and Ludmila SNEED being able to help her with the things her and Dr. Barraza discussed.    Appointment start time: 1004  Appointment completion time: 1110  Total time spent with patient (in minutes): 66  Non-Billable Time: 66  Billable Time Total: 0    Roberta Gallego RN

## 2025-01-27 NOTE — NURSING NOTE
Report from Sending RN:    Report From: Abram ( RN)  Recent Surgery of Procedure: No  Baseline Level of Consciousness (LOC): a/o x 4  Oxygen Use: No  Type: none  Diabetic: Yes, 359  Last BP Med Given Day of Dialysis: yes, see mar  Last Pain Med Given: yes, see mar  Lab Tests to be Obtained with Dialysis: No  Blood Transfusion to be Given During Dialysis: No  Available IV Access: Yes  Medications to be Administered During Dialysis: No  Continuous IV Infusion Running: No  Restraints on Currently or in the Last 24 Hours: No  Hand-Off Communication: No acute overnight or morning events; vss; Pt did take morning medications; Pt will not need labs; Pt is a DNR; Cassidy Kenyon RN.  Dialysis Catheter Dressing: right upper arm AVF  Last Dressing Change: will assess when pt arrives to the unit

## 2025-01-27 NOTE — NURSING NOTE
Report to Receiving RN:    Report To: Abram Rubio RN)  Time Report Called: 1450 pm  Hand-Off Communication: fluid removal 2 liters  Complications During Treatment: No  Ultrafiltration Treatment: No  Medications Administered During Dialysis: No  Blood Products Administered During Dialysis: No  Labs Sent During Dialysis: No  Heparin Drip Rate Changes: No  Dialysis Catheter Dressing: right arm AVF  Last Dressing Change: 01/27/2025    Electronic Signatures:   (Signed Franchesca Kenyon RN)   Authored:    (Signed )   Authored:     Last Updated: 2:50 PM by FRANCHESCA KENYON

## 2025-01-27 NOTE — HH CARE COORDINATION
Home Care received a Referral for Nursing, Physical Therapy, Occupational Therapy, and Medical Social Work. We have processed the referral for a Start of Care on 24-48 HOURS .     If you have any questions or concerns, please feel free to contact us at [phone]. Follow the prompts, enter your five digit zip code, and you will be directed to your care team on [facility] 3.

## 2025-01-29 ENCOUNTER — DOCUMENTATION (OUTPATIENT)
Dept: BEHAVIORAL HEALTH | Facility: CLINIC | Age: 55
End: 2025-01-29

## 2025-01-29 ENCOUNTER — APPOINTMENT (OUTPATIENT)
Dept: PRIMARY CARE | Facility: CLINIC | Age: 55
End: 2025-01-29
Payer: COMMERCIAL

## 2025-01-29 DIAGNOSIS — F41.9 ANXIETY: ICD-10-CM

## 2025-01-29 NOTE — PROGRESS NOTES
Stacey Heath  Age: 54 y.o.  MRN: [id number]  Date: 1/29/2025  Location of service: in community    Program Details  Medicaid Community Clinical Case Management  Status: Enrolled  Effective Dates: 10/17/2023 - present  Responsible Staff: NAREN Davidson      Goals Reviewed:  Problem: Access to Care Issue       Goal: Assess and Address Access Barriers       Priority: Medium        Problem: Anxiety       Goal: Maintain coping skills for continuous improvement       Priority: Medium          Problem: Kidney Issues       Goal: Improve health to get on kidney transplant list       Priority: High        Problem: Medication Adherence       Goal: Adherence to Medication Regimen       Priority: High              Summary:  This provider met with patient at the patient's home. This provider listened with empathy as patient talked about having an increase in her medication since leaving the hospital. This provider and patient discussed patient's plan regarding some lifestyle changes to her diet, housing, and being more mobile. This provider also engaged patient in conversation in regards to being honest with her family about her health and the support that she will need from them. We also talked about genetic testing in which patient states she had the completed a long time ago. This provider increased patient's awareness regarding patient's anxiety and having a teenage boy living in the home.                    NAREN Davidson

## 2025-01-30 ENCOUNTER — PATIENT OUTREACH (OUTPATIENT)
Dept: CARE COORDINATION | Facility: CLINIC | Age: 55
End: 2025-01-30
Payer: COMMERCIAL

## 2025-01-30 ENCOUNTER — DOCUMENTATION (OUTPATIENT)
Dept: BEHAVIORAL HEALTH | Facility: CLINIC | Age: 55
End: 2025-01-30
Payer: COMMERCIAL

## 2025-01-30 NOTE — PROGRESS NOTES
Spoke to pt today. Scheduled next appt with this RD for 2/14 at 10:30. Zoom link sent to pt's email as well as personalized care LSW who will help pt connect to appt

## 2025-01-31 DIAGNOSIS — R79.1 COAGULATION TEST ABNORMALITY: Primary | ICD-10-CM

## 2025-02-01 DIAGNOSIS — G62.9 NEUROPATHY: Primary | ICD-10-CM

## 2025-02-01 RX ORDER — VITAMIN E MIXED 400 UNIT
200 CAPSULE ORAL DAILY
Qty: 90 CAPSULE | Refills: 3 | Status: SHIPPED | OUTPATIENT
Start: 2025-02-01 | End: 2026-02-01

## 2025-02-02 ENCOUNTER — HOME CARE VISIT (OUTPATIENT)
Dept: HOME HEALTH SERVICES | Facility: HOME HEALTH | Age: 55
End: 2025-02-02

## 2025-02-03 ENCOUNTER — HOME CARE VISIT (OUTPATIENT)
Dept: HOME HEALTH SERVICES | Facility: HOME HEALTH | Age: 55
End: 2025-02-03

## 2025-02-04 ENCOUNTER — HOME CARE VISIT (OUTPATIENT)
Dept: HOME HEALTH SERVICES | Facility: HOME HEALTH | Age: 55
End: 2025-02-04

## 2025-02-05 ENCOUNTER — DOCUMENTATION (OUTPATIENT)
Dept: BEHAVIORAL HEALTH | Facility: CLINIC | Age: 55
End: 2025-02-05
Payer: COMMERCIAL

## 2025-02-06 NOTE — PROGRESS NOTES
Stacey Heath  Age: 54 y.o.  MRN: [id number]  Date: 2/5/2025  Location of service: phone call    Program Details  Medicaid Community Clinical Case Management  Status: Enrolled  Effective Dates: 10/17/2023 - present  Responsible Staff: NAREN Davidson      Goals Reviewed:  Problem: Access to Care Issue       Goal: Assess and Address Access Barriers       Priority: Medium        Problem: Anxiety       Goal: Maintain coping skills for continuous improvement       Priority: Medium        Problem: Financial Stressors       Goal: Assistance with financial concerns         Problem: Kidney Issues       Goal: Improve health to get on kidney transplant list       Priority: High        Problem: Medication Adherence       Goal: Adherence to Medication Regimen       Priority: High        Problem: Negative Experience, Conflict with, or Distrust of Providers and/or Health System       Goal: Plan to Address Patient Specific Negative Experience, Distrust, or Conflict with Providers and/or Health System       Priority: High        Problem: Risk of Uncoordinated Care       Goal: Care will be Coordinated and Supported by a Multidisciplinary Team of Providers       Priority: High          Summary:  This provider spoke with patient over the phone. Provider listened with empathy as patient talked about the reason why she has been admitted to [facility]. This provider reminded patient of how well patient felt when she was able to get down to her dry weight at dialysis. This provider set up an appointment with patient on Friday.                      NAREN Davidson

## 2025-02-07 ENCOUNTER — DOCUMENTATION (OUTPATIENT)
Dept: BEHAVIORAL HEALTH | Facility: CLINIC | Age: 55
End: 2025-02-07
Payer: COMMERCIAL

## 2025-02-08 NOTE — PROGRESS NOTES
Stacey Heath  Age: 54 y.o.  MRN: [id number]  Date: 1/30/2025  Location of service: in community    Program Details  Medicaid Community Clinical Case Management  Status: Enrolled  Effective Dates: 10/17/2023 - present  Responsible Staff: NAREN Davidson      Goals Reviewed:  Problem: Kidney Issues       Goal: Improve health to get on kidney transplant list       Priority: High        Problem: Negative Experience, Conflict with, or Distrust of Providers and/or Health System       Goal: Plan to Address Patient Specific Negative Experience, Distrust, or Conflict with Providers and/or Health System       Priority: High        Problem: Risk of Uncoordinated Care       Goal: Care will be Coordinated and Supported by a Multidisciplinary Team of Providers       Priority: High          Summary:  This writer meets with patient at dialysis.   Patient states she is very happy because she got over 2 liters removed at her previous dialysis session.   Patient expresses excitement over moving and states they met a landlord who is a family oriented person and wants a family to move into their apartment.  This writer engages in relationship building.    Appointment start time: 1234  Appointment completion time: 1328  Total time spent with patient (in minutes): 54  Non-Billable Time: 54  Billable Time Total: 0    Roberta Gallego RN

## 2025-02-08 NOTE — PROGRESS NOTES
Stacey Heath  Age: 54 y.o.  MRN: [id number]  Date: 2/5/2025  Location of service: phone call    Program Details  Medicaid Community Clinical Case Management  Status: Enrolled  Effective Dates: 10/17/2023 - present  Responsible Staff: NAREN Davidson      Goals Reviewed:      Summary:  This writer calls patient back after patient calls this writer. Patient did not leave a voicemail and patient was unable to answer when this writer called. This writer left a voicemail.    Appointment start time: 0956  Appointment completion time: 0957  Total time spent with patient (in minutes): 1  Non-Billable Time: 1  Billable Time Total: 0    Roberta Gallego RN

## 2025-02-10 PROCEDURE — RXMED WILLOW AMBULATORY MEDICATION CHARGE

## 2025-02-10 NOTE — PROGRESS NOTES
Stacey Heath  Age: 54 y.o.  MRN: [id number]  Date: 2/07/2025  Location of service: in community    Program Details  Medicaid Community Clinical Case Management  Status: Enrolled  Effective Dates: 10/17/2023 - present  Responsible Staff: NAREN Davidson      Goals Reviewed:  Problem: Access to Care Issue       Goal: Assess and Address Access Barriers       Priority: Medium        Problem: Anxiety       Goal: Maintain coping skills for continuous improvement       Priority: Medium        Problem: Kidney Issues       Goal: Improve health to get on kidney transplant list       Priority: High              Summary:  This provider met with patient at the [facility] where patient had been admitted for pneumonia. This provider made it to the patient's room as the cardiologist was in the room explaining to the patient the next steps in patient's care.  This provider listened with empathy as patient explained why and how she ended up admitted to the hospital. This provider asked patient follow up questions regarding patient's level of anxiety as she was initially on her way to the ED. Provider reviewed some coping strategies with patient when anxiety levels are increasing.                    NAREN Davidson

## 2025-02-11 ENCOUNTER — APPOINTMENT (OUTPATIENT)
Dept: DIALYSIS | Facility: HOSPITAL | Age: 55
End: 2025-02-11
Payer: COMMERCIAL

## 2025-02-11 ENCOUNTER — DOCUMENTATION (OUTPATIENT)
Dept: BEHAVIORAL HEALTH | Facility: CLINIC | Age: 55
End: 2025-02-11
Payer: COMMERCIAL
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
## 2025-02-12 ENCOUNTER — DOCUMENTATION (OUTPATIENT)
Dept: BEHAVIORAL HEALTH | Facility: CLINIC | Age: 55
End: 2025-02-12
Payer: COMMERCIAL

## 2025-02-12 DIAGNOSIS — F41.9 ANXIETY: ICD-10-CM
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
## 2025-02-13 ENCOUNTER — HOSPITAL ENCOUNTER (EMERGENCY)
Facility: HOSPITAL | Age: 55
Discharge: HOME | End: 2025-02-13
Attending: EMERGENCY MEDICINE
Payer: COMMERCIAL

## 2025-02-13 ENCOUNTER — APPOINTMENT (OUTPATIENT)
Dept: RADIOLOGY | Facility: HOSPITAL | Age: 55
End: 2025-02-13
Payer: COMMERCIAL

## 2025-02-13 VITALS
SYSTOLIC BLOOD PRESSURE: 156 MMHG | HEART RATE: 75 BPM | TEMPERATURE: 97.8 F | WEIGHT: 127 LBS | DIASTOLIC BLOOD PRESSURE: 70 MMHG | BODY MASS INDEX: 29.39 KG/M2 | OXYGEN SATURATION: 96 % | HEIGHT: 55 IN | RESPIRATION RATE: 14 BRPM

## 2025-02-13 DIAGNOSIS — R07.9 CHEST PAIN, UNSPECIFIED TYPE: Primary | ICD-10-CM

## 2025-02-13 DIAGNOSIS — R05.2 SUBACUTE COUGH: ICD-10-CM

## 2025-02-13 DIAGNOSIS — R06.02 SHORTNESS OF BREATH: ICD-10-CM

## 2025-02-13 LAB
ALBUMIN SERPL BCP-MCNC: 3.9 G/DL (ref 3.4–5)
ALP SERPL-CCNC: 103 U/L (ref 33–110)
ALT SERPL W P-5'-P-CCNC: 6 U/L (ref 7–45)
ANION GAP BLDV CALCULATED.4IONS-SCNC: 12 MMOL/L (ref 10–25)
ANION GAP SERPL CALC-SCNC: 20 MMOL/L (ref 10–20)
AST SERPL W P-5'-P-CCNC: 17 U/L (ref 9–39)
BASE EXCESS BLDV CALC-SCNC: 4.9 MMOL/L (ref -2–3)
BASOPHILS # BLD AUTO: 0.03 X10*3/UL (ref 0–0.1)
BASOPHILS NFR BLD AUTO: 0.8 %
BILIRUB SERPL-MCNC: 0.4 MG/DL (ref 0–1.2)
BNP SERPL-MCNC: 956 PG/ML (ref 0–99)
BODY TEMPERATURE: 37 DEGREES CELSIUS
BUN SERPL-MCNC: 36 MG/DL (ref 6–23)
CA-I BLDV-SCNC: 0.99 MMOL/L (ref 1.1–1.33)
CALCIUM SERPL-MCNC: 8.5 MG/DL (ref 8.6–10.6)
CARDIAC TROPONIN I PNL SERPL HS: 43 NG/L (ref 0–34)
CARDIAC TROPONIN I PNL SERPL HS: 44 NG/L (ref 0–34)
CHLORIDE BLDV-SCNC: 98 MMOL/L (ref 98–107)
CHLORIDE SERPL-SCNC: 95 MMOL/L (ref 98–107)
CO2 SERPL-SCNC: 28 MMOL/L (ref 21–32)
CREAT SERPL-MCNC: 10.2 MG/DL (ref 0.5–1.05)
EGFRCR SERPLBLD CKD-EPI 2021: 4 ML/MIN/1.73M*2
EOSINOPHIL # BLD AUTO: 0.29 X10*3/UL (ref 0–0.7)
EOSINOPHIL NFR BLD AUTO: 7.4 %
ERYTHROCYTE [DISTWIDTH] IN BLOOD BY AUTOMATED COUNT: 15.1 % (ref 11.5–14.5)
GLUCOSE BLD MANUAL STRIP-MCNC: 116 MG/DL (ref 74–99)
GLUCOSE BLDV-MCNC: 72 MG/DL (ref 74–99)
GLUCOSE SERPL-MCNC: 70 MG/DL (ref 74–99)
HCO3 BLDV-SCNC: 31.1 MMOL/L (ref 22–26)
HCT VFR BLD AUTO: 27.8 % (ref 36–46)
HCT VFR BLD EST: 25 % (ref 36–46)
HGB BLD-MCNC: 8.9 G/DL (ref 12–16)
HGB BLDV-MCNC: 8.3 G/DL (ref 12–16)
IMM GRANULOCYTES # BLD AUTO: 0.01 X10*3/UL (ref 0–0.7)
IMM GRANULOCYTES NFR BLD AUTO: 0.3 % (ref 0–0.9)
INHALED O2 CONCENTRATION: 28 %
LACTATE BLDV-SCNC: 0.8 MMOL/L (ref 0.4–2)
LYMPHOCYTES # BLD AUTO: 1.1 X10*3/UL (ref 1.2–4.8)
LYMPHOCYTES NFR BLD AUTO: 28 %
MCH RBC QN AUTO: 29.2 PG (ref 26–34)
MCHC RBC AUTO-ENTMCNC: 32 G/DL (ref 32–36)
MCV RBC AUTO: 91 FL (ref 80–100)
MONOCYTES # BLD AUTO: 0.41 X10*3/UL (ref 0.1–1)
MONOCYTES NFR BLD AUTO: 10.4 %
NEUTROPHILS # BLD AUTO: 2.09 X10*3/UL (ref 1.2–7.7)
NEUTROPHILS NFR BLD AUTO: 53.1 %
NRBC BLD-RTO: 0 /100 WBCS (ref 0–0)
OXYHGB MFR BLDV: 42.4 % (ref 45–75)
PCO2 BLDV: 55 MM HG (ref 41–51)
PH BLDV: 7.36 PH (ref 7.33–7.43)
PLATELET # BLD AUTO: 250 X10*3/UL (ref 150–450)
PO2 BLDV: 32 MM HG (ref 35–45)
POTASSIUM BLDV-SCNC: 5.1 MMOL/L (ref 3.5–5.3)
POTASSIUM SERPL-SCNC: 4.8 MMOL/L (ref 3.5–5.3)
PROT SERPL-MCNC: 7.6 G/DL (ref 6.4–8.2)
RBC # BLD AUTO: 3.05 X10*6/UL (ref 4–5.2)
SAO2 % BLDV: 43 % (ref 45–75)
SODIUM BLDV-SCNC: 136 MMOL/L (ref 136–145)
SODIUM SERPL-SCNC: 138 MMOL/L (ref 136–145)
WBC # BLD AUTO: 3.9 X10*3/UL (ref 4.4–11.3)

## 2025-02-13 PROCEDURE — 84132 ASSAY OF SERUM POTASSIUM: CPT

## 2025-02-13 PROCEDURE — 84484 ASSAY OF TROPONIN QUANT: CPT | Performed by: EMERGENCY MEDICINE

## 2025-02-13 PROCEDURE — 82947 ASSAY GLUCOSE BLOOD QUANT: CPT

## 2025-02-13 PROCEDURE — 36415 COLL VENOUS BLD VENIPUNCTURE: CPT | Performed by: EMERGENCY MEDICINE

## 2025-02-13 PROCEDURE — 84484 ASSAY OF TROPONIN QUANT: CPT

## 2025-02-13 PROCEDURE — 99284 EMERGENCY DEPT VISIT MOD MDM: CPT | Mod: 25 | Performed by: EMERGENCY MEDICINE

## 2025-02-13 PROCEDURE — 2500000001 HC RX 250 WO HCPCS SELF ADMINISTERED DRUGS (ALT 637 FOR MEDICARE OP)

## 2025-02-13 PROCEDURE — 36415 COLL VENOUS BLD VENIPUNCTURE: CPT

## 2025-02-13 PROCEDURE — 71046 X-RAY EXAM CHEST 2 VIEWS: CPT | Performed by: RADIOLOGY

## 2025-02-13 PROCEDURE — 71046 X-RAY EXAM CHEST 2 VIEWS: CPT

## 2025-02-13 PROCEDURE — 85025 COMPLETE CBC W/AUTO DIFF WBC: CPT

## 2025-02-13 PROCEDURE — 83880 ASSAY OF NATRIURETIC PEPTIDE: CPT

## 2025-02-13 RX ORDER — HYDRALAZINE HYDROCHLORIDE 25 MG/1
100 TABLET, FILM COATED ORAL ONCE
Status: COMPLETED | OUTPATIENT
Start: 2025-02-13 | End: 2025-02-13

## 2025-02-13 RX ORDER — CARVEDILOL 12.5 MG/1
25 TABLET ORAL ONCE
Status: COMPLETED | OUTPATIENT
Start: 2025-02-13 | End: 2025-02-13

## 2025-02-13 RX ADMIN — CARVEDILOL 25 MG: 12.5 TABLET, FILM COATED ORAL at 15:37

## 2025-02-13 RX ADMIN — HYDRALAZINE HYDROCHLORIDE 100 MG: 25 TABLET ORAL at 15:37

## 2025-02-13 ASSESSMENT — LIFESTYLE VARIABLES
HAVE PEOPLE ANNOYED YOU BY CRITICIZING YOUR DRINKING: NO
HAVE YOU EVER FELT YOU SHOULD CUT DOWN ON YOUR DRINKING: NO
EVER FELT BAD OR GUILTY ABOUT YOUR DRINKING: NO
EVER HAD A DRINK FIRST THING IN THE MORNING TO STEADY YOUR NERVES TO GET RID OF A HANGOVER: NO
TOTAL SCORE: 0

## 2025-02-13 ASSESSMENT — PAIN DESCRIPTION - DESCRIPTORS
DESCRIPTORS: PRESSURE
DESCRIPTORS: PRESSURE

## 2025-02-13 ASSESSMENT — PAIN DESCRIPTION - LOCATION: LOCATION: CHEST

## 2025-02-13 ASSESSMENT — PAIN SCALES - GENERAL
PAINLEVEL_OUTOF10: 8
PAINLEVEL_OUTOF10: 8

## 2025-02-13 ASSESSMENT — PAIN - FUNCTIONAL ASSESSMENT: PAIN_FUNCTIONAL_ASSESSMENT: 0-10

## 2025-02-13 ASSESSMENT — PAIN DESCRIPTION - PAIN TYPE: TYPE: ACUTE PAIN

## 2025-02-13 NOTE — ED TRIAGE NOTES
Pt presents to ED from home with c/o CP and SOB. Pt receives dialysis T/TH/SA. Pt was at her appointment and before the session started pt had CP. Pt states her pain is worse when she coughs and takes deep breaths. Pt is A+Ox4. Pt is on 3L per EMS. Pt is on RA at baseline. Pt was also HTN for EMS.

## 2025-02-13 NOTE — PROGRESS NOTES
Emergency Department Transition of Care Note       Signout   I received Stacey Heath in signout from Dr. Greene.  Please see the ED Provider Note for all HPI, PE and MDM up to the time of signout at 1500.  This is in addition to the primary record.    In brief Stacey Heath is an 54 y.o. female with ESRD on HD Tues/Thurs/Sat, T2DM, HTN, CAD, HFpEF, COPD presenting with chest pain that began prior to dialysis session today. She was recently admitted for pneumonia and has had persistent SOB and cough since discharge.    At the time of signout we were awaiting:  Lab results  Chest x-ray  Repeat BP after home medications    ED Course & Medical Decision Making   Medical Decision Making:  Under my care, the patient was saturating well on room air.  Chest x-ray did not reveal a pneumonia or significant vascular congestion concerning for fluid overload.  Labs were notable for a slight drop in hemoglobin to 8.9, however no leukocytosis to suggest acute infection.  CMP did initially reveal mild hypoglycemia, however repeat point-of-care glucose was 116.  On CMP, potassium was normal at 4.8 and there was no concern for need for emergent dialysis for hyperkalemia or fluid overload.  BNP and troponin were consistent with prior values.  Blood pressure improved following home antihypertensives.  EKG was unchanged with no signs of acute ischemia as a result of hypertension.  Repeat troponin was 43 (prior 44).  Walking pulse ox was obtained with no desaturation events, patient maintained >94%.  The patient was reassured regarding her chest pain and lingering cough.  She was amenable to discharge.  She verbalized understanding of return precautions and discharge instructions prior to leaving the ED.    ED Course:  ED Course as of 02/13/25 1809   Thu Feb 13, 2025   1608 BNP(!): 956  Elevated, but similar to last month.  [AC]   1609 Troponin I, High Sensitivity(!)  Eleavted at 44, but similar to previous. Patient did not have any  ischemic changes on EKG when compared to previous. [AC]   1614 BNP(!): 956 [MG]   1614 POTASSIUM: 4.8 [MG]   1614 Troponin I, High Sensitivity (CMC)(!): 44  Not elevated from previous [MG]   1614 CBC and Auto Differential(!)  Hgb with slight drop, just over 1 point. Otherwise unremarkable [MG]   1617 Senior resident supervision note: Patient seen and evaluated with [place] resident. In brief, patient is a 54-year-old female with past medical history of ESRD on dialysis Tuesday/Thursday/Saturday, T2DM, CAD, HTN, HFpEF, who presented with chest pain and shortness of breath.  Reports somewhat chronic symptoms but acutely worse.  Did reportedly missed dialysis today for the sessions but got full session on Tuesday.  Patient does have evidence of fluid overload likely secondary to missed dialysis but exam otherwise generally reassuring.  Screen for other cardiopulmonary cause of symptoms with labs and imaging that were pending at time of signout.  Predominant suspicion is that patient predominately needs dialysis for fluid overload with relatively low pretest probability for ACS otherwise.   [BF]   1655 Troponin I, High Sensitivity (CMC)(!): 43 [MG]      ED Course User Index  [AC] Arturo Greene DO  [BF] Feliciano Mcneal MD  [MG] Nilsa Abbott MD         Diagnoses as of 02/13/25 1809   Chest pain, unspecified type   Subacute cough   Shortness of breath       Disposition   As a result of the work-up, the patient was discharged home.  she was informed of her diagnosis and instructed to come back with any concerns or worsening of condition.  she and was agreeable to the plan as discussed above.  she was given the opportunity to ask questions.  All of the patient's questions were answered.    Patient seen and discussed with ED attending physician.    Nilsa Abbott MD  Emergency Medicine

## 2025-02-13 NOTE — DISCHARGE INSTRUCTIONS
You were seen in the ED today for chest pain and cough. Your labs and imaging were reassuring. You do not appear to have a new pneumonia and your cardiac workup was unchanged.  You do not appear to need emergent dialysis at this time, however it is important that you show up for your next session.  Please return to the emergency department as needed, including if chest pain becomes significantly worse or changes in character.

## 2025-02-14 ENCOUNTER — APPOINTMENT (OUTPATIENT)
Dept: CARE COORDINATION | Facility: CLINIC | Age: 55
End: 2025-02-14
Payer: COMMERCIAL

## 2025-02-14 ENCOUNTER — DOCUMENTATION (OUTPATIENT)
Dept: BEHAVIORAL HEALTH | Facility: CLINIC | Age: 55
End: 2025-02-14

## 2025-02-14 DIAGNOSIS — F41.9 ANXIETY: ICD-10-CM

## 2025-02-14 NOTE — PROGRESS NOTES
"This RD attempted to call pt for our fuv. Pt had been sleeping but agreed to talk with this RD. Pt wasn't able to share much about yesterday ED visit, states she isn't feeling well because she has a cough, and that the ED told her \"she was fine\". This RD attempted to provided insight into minimizing fluid gains btwn HD sessions her renal's instructions and how this differs from \"needing to lose weight\" which is what pt originally thought. However pt stopped this RD requesting to end todays appt short. Per pt \"something is going on and I can't remember any of this\". This RD ended appt, notified pt that I would be contacting personalized care LSW who also works with pt and who she has an appt with later today.     This RD then called LSW and updated her of the above. Will work with LSW to schedule next fuv with pt while LSW is with her.   "
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
## 2025-02-18 ENCOUNTER — DOCUMENTATION (OUTPATIENT)
Dept: BEHAVIORAL HEALTH | Facility: CLINIC | Age: 55
End: 2025-02-18
Payer: COMMERCIAL

## 2025-02-18 NOTE — PROGRESS NOTES
Stacey Heath  Age: 54 y.o.  MRN: [id number]  Date: 2/18/2025  Location of service: in community    Program Details  Medicaid Community Clinical Case Management  Status: Enrolled  Effective Dates: 10/17/2023 - present  Responsible Staff: NAREN Davidson      Goals Reviewed:  Problem: Anxiety       Goal: Maintain coping skills for continuous improvement       Priority: Medium        Problem: Kidney Issues       Goal: Improve health to get on kidney transplant list       Priority: High        Problem: Negative Experience, Conflict with, or Distrust of Providers and/or Health System       Goal: Plan to Address Patient Specific Negative Experience, Distrust, or Conflict with Providers and/or Health System       Priority: High        Problem: Risk of Uncoordinated Care       Goal: Care will be Coordinated and Supported by a Multidisciplinary Team of Providers       Priority: High          Summary:  This writer met with patient at dialysis for a community visit.  Patient arrived at dialysis late d/t her transportation running late. Patient is scheduled to be on the machine until 1546.  Patient states she has a headache and states her fistula access is burning. Patient states she received Tylenol and states they have attempted to fix the burning at his fistula access several times without success.  Patient states she is going to try to stay on until at least 1500.  Patient does end early around 1440.  This writer waits with patient to provide support as she is disconnected from the machine.  This writer assists patient in getting ahold of and getting out to her transportation.     Appointment start time: 1320  Appointment completion time: 1541  Total time spent with patient (in minutes): 141  Non-Billable Time: 141  Billable Time Total: 0    Roberta Gallego RN

## 2025-02-19 ENCOUNTER — DOCUMENTATION (OUTPATIENT)
Dept: CARE COORDINATION | Facility: CLINIC | Age: 55
End: 2025-02-19
Payer: COMMERCIAL

## 2025-02-19 NOTE — PROGRESS NOTES
Discussed pt and this's RD most recent appt with Personalized Care team. Agreed that this RD will pause outreaches with pt at this time. Will re-engage as needed/ when pt is ready to meet with this RD again.

## 2025-02-20 ENCOUNTER — DOCUMENTATION (OUTPATIENT)
Dept: BEHAVIORAL HEALTH | Facility: CLINIC | Age: 55
End: 2025-02-20

## 2025-02-20 ENCOUNTER — PREP FOR PROCEDURE (OUTPATIENT)
Dept: RADIOLOGY | Facility: HOSPITAL | Age: 55
End: 2025-02-20

## 2025-02-20 ENCOUNTER — DOCUMENTATION (OUTPATIENT)
Dept: BEHAVIORAL HEALTH | Facility: CLINIC | Age: 55
End: 2025-02-20
Payer: COMMERCIAL

## 2025-02-20 ENCOUNTER — PHARMACY VISIT (OUTPATIENT)
Dept: PHARMACY | Facility: CLINIC | Age: 55
End: 2025-02-20
Payer: COMMERCIAL

## 2025-02-20 DIAGNOSIS — N18.6 ESRD (END STAGE RENAL DISEASE) ON DIALYSIS (MULTI): Primary | ICD-10-CM

## 2025-02-20 DIAGNOSIS — Z99.2 ESRD (END STAGE RENAL DISEASE) ON DIALYSIS (MULTI): Primary | ICD-10-CM

## 2025-02-20 NOTE — PROGRESS NOTES
Stacey Heath  Age: 54 y.o.  MRN: [id number]  Date: 2/20/2025  Location of service: in community    Program Details  Medicaid Community Clinical Case Management  Status: Enrolled  Effective Dates: 10/17/2023 - present  Responsible Staff: NAREN Davidson      Goals Reviewed:  Problem: Access to Care Issue       Goal: Assess and Address Access Barriers       Priority: Medium        Problem: Anxiety       Goal: Maintain coping skills for continuous improvement       Priority: Medium        Problem: Financial Stressors       Goal: Assistance with financial concerns         Problem: Kidney Issues       Goal: Improve health to get on kidney transplant list       Priority: High        Problem: Medication Adherence       Goal: Adherence to Medication Regimen       Priority: High        Problem: Negative Experience, Conflict with, or Distrust of Providers and/or Health System       Goal: Plan to Address Patient Specific Negative Experience, Distrust, or Conflict with Providers and/or Health System       Priority: High        Problem: Risk of Uncoordinated Care       Goal: Care will be Coordinated and Supported by a Multidisciplinary Team of Providers       Priority: High          Summary:  This provider met with patient at [facility] for patient's dialysis. This provider listened with empathy as patient talked about not being able to make contact with [employer] for SNAP benefits. This provider attempted to make contact with [employer] ([employer] on behalf of patient and was on hold for over one hour and ten minutes. This provider also arranged for transportation for the patient's medical appointment next Friday. This provider and patient engaged in conversation regarding the provider and RN Case manager sitting at dialysis with patient. The patient reports that she is feeling as if the visits at every dialysis treatment is over bearing and would like the provider and RN case manager to  take a step back. This provider promised to bring this concern up during our weekly meeting.                      NAREN Davidson

## 2025-02-21 RX ORDER — PREDNISONE 20 MG/1
TABLET ORAL
Qty: 8 TABLET | Refills: 0 | Status: SHIPPED | OUTPATIENT
Start: 2025-02-21

## 2025-02-21 RX ORDER — DIPHENHYDRAMINE HCL 50 MG
CAPSULE ORAL
Qty: 1 CAPSULE | Refills: 0 | Status: SHIPPED | OUTPATIENT
Start: 2025-02-21

## 2025-02-24 NOTE — PROGRESS NOTES
Stacey Heath  Age: 54 y.o.  MRN: [id number]  Date: 2/20/2025  Location of service: in community    Program Details  Medicaid Community Clinical Case Management  Status: Enrolled  Effective Dates: 10/17/2023 - present  Responsible Staff: NAREN Davidson      Goals Reviewed:  Problem: Access to Care Issue       Goal: Assess and Address Access Barriers       Priority: Medium        Problem: Anxiety       Goal: Maintain coping skills for continuous improvement       Priority: Medium        Problem: Kidney Issues       Goal: Improve health to get on kidney transplant list       Priority: High        Problem: Negative Experience, Conflict with, or Distrust of Providers and/or Health System       Goal: Plan to Address Patient Specific Negative Experience, Distrust, or Conflict with Providers and/or Health System       Priority: High        Problem: Risk of Uncoordinated Care       Goal: Care will be Coordinated and Supported by a Multidisciplinary Team of Providers       Priority: High          Summary:  Provider met with patient at [facility] for dialysis. SW Ludmila Trinh was with patient when provider arrived but left soon after this provider arrived. Provider utilized empathic listening to check in with patient about how she is feeling about dialysis and concerns about her health. Provider picked up medication for patient and gave it to patient. However, medication was not what patient needed to provider reached out to patient's care team to inform them of situation.     Appointment start time: 1320  Appointment completion time: 1405  Total time spent with patient (in minutes): 45  Non-Billable Time: 45  Billable Time Total: 0    PORFIRIO Valderrama

## 2025-02-25 ENCOUNTER — DOCUMENTATION (OUTPATIENT)
Dept: BEHAVIORAL HEALTH | Facility: CLINIC | Age: 55
End: 2025-02-25
Payer: COMMERCIAL

## 2025-02-25 NOTE — PROGRESS NOTES
Stacey Heath  Age: 54 y.o.  MRN: [id number]  Date: 2/25/2025  Location of service: phone call    Program Details  Medicaid Community Clinical Case Management  Status: Enrolled  Effective Dates: 10/17/2023 - present  Responsible Staff: NAREN Davidson      Goals Reviewed:  Problem: Access to Care Issue       Goal: Assess and Address Access Barriers       Priority: Medium        Problem: Anxiety       Goal: Maintain coping skills for continuous improvement       Priority: Medium        Problem: Financial Stressors       Goal: Assistance with financial concerns         Problem: Kidney Issues       Goal: Improve health to get on kidney transplant list       Priority: High        Problem: Medication Adherence       Goal: Adherence to Medication Regimen       Priority: High        Problem: Negative Experience, Conflict with, or Distrust of Providers and/or Health System       Goal: Plan to Address Patient Specific Negative Experience, Distrust, or Conflict with Providers and/or Health System       Priority: High        Problem: Risk of Uncoordinated Care       Goal: Care will be Coordinated and Supported by a Multidisciplinary Team of Providers       Priority: High          Summary:  This provider made successful contact with patient via telephone call. This provider listened with empathy as patient reported being back in the hospital due to difficulty breathing. Provider let patient know that patient's request regarding visits at dialysis was addressed with the team yesterday. This provider encouraged patient to call provider if there is anything the patient may need.                      NAREN Davidson

## 2025-02-26 PROCEDURE — RXMED WILLOW AMBULATORY MEDICATION CHARGE

## 2025-02-27 PROCEDURE — RXMED WILLOW AMBULATORY MEDICATION CHARGE

## 2025-02-28 ENCOUNTER — APPOINTMENT (OUTPATIENT)
Facility: HOSPITAL | Age: 55
End: 2025-02-28
Payer: COMMERCIAL

## 2025-03-03 ENCOUNTER — DOCUMENTATION (OUTPATIENT)
Dept: BEHAVIORAL HEALTH | Facility: CLINIC | Age: 55
End: 2025-03-03
Payer: COMMERCIAL

## 2025-03-03 ENCOUNTER — PHARMACY VISIT (OUTPATIENT)
Dept: PHARMACY | Facility: CLINIC | Age: 55
End: 2025-03-03
Payer: MEDICARE

## 2025-03-03 NOTE — PROGRESS NOTES
Stacey Heath  Age: 54 y.o.  MRN: [id number]  Date: 3/3/2025  Location of service: phone call    Program Details  Medicaid Community Clinical Case Management  Status: Enrolled  Effective Dates: 10/17/2023 - present  Responsible Staff: NAREN Davidson      Goals Reviewed:  Problem: Access to Care Issue       Goal: Assess and Address Access Barriers       Priority: Medium        Problem: Anxiety       Goal: Maintain coping skills for continuous improvement       Priority: Medium        Problem: Financial Stressors       Goal: Assistance with financial concerns         Problem: Kidney Issues       Goal: Improve health to get on kidney transplant list       Priority: High        Problem: Medication Adherence       Goal: Adherence to Medication Regimen       Priority: High        Problem: Negative Experience, Conflict with, or Distrust of Providers and/or Health System       Goal: Plan to Address Patient Specific Negative Experience, Distrust, or Conflict with Providers and/or Health System       Priority: High        Problem: Risk of Uncoordinated Care       Goal: Care will be Coordinated and Supported by a Multidisciplinary Team of Providers       Priority: High          Summary:  This provider made successful telephone contact with the patient. This provider explained to the call was to confirm our appointment for this week and to confirm that patient was released from [facility] in [place].                      NAREN Davidson

## 2025-03-07 ENCOUNTER — DOCUMENTATION (OUTPATIENT)
Dept: BEHAVIORAL HEALTH | Facility: CLINIC | Age: 55
End: 2025-03-07
Payer: COMMERCIAL

## 2025-03-07 ENCOUNTER — DOCUMENTATION (OUTPATIENT)
Dept: BEHAVIORAL HEALTH | Facility: CLINIC | Age: 55
End: 2025-03-07

## 2025-03-07 DIAGNOSIS — F41.9 ANXIETY: ICD-10-CM

## 2025-03-07 RX ORDER — ASPIRIN 81 MG/1
81 TABLET ORAL DAILY
COMMUNITY

## 2025-03-07 NOTE — PROGRESS NOTES
Stacey Heath  Age: 54 y.o.  MRN: [id number]  Date: 3/7/2025  Location of service: in community    Program Details  Medicaid Community Clinical Case Management  Status: Enrolled  Effective Dates: 10/17/2023 - present  Responsible Staff: NAREN Davidson      Goals Reviewed:  Problem: Anxiety       Goal: Maintain coping skills for continuous improvement       Priority: Medium        Problem: Kidney Issues       Goal: Improve health to get on kidney transplant list       Priority: High        Problem: Medication Adherence       Goal: Adherence to Medication Regimen       Priority: High        Problem: Risk of Uncoordinated Care       Goal: Care will be Coordinated and Supported by a Multidisciplinary Team of Providers       Priority: High          Summary:  This writer meets with patient in her home for a community visit.  Patient reports being out of Lipitor and needing a refill. This writer reaches out to Dr. Barraza via email regarding the Lipitor and the patient's medication storage.  Patient states she no longer has a weekly pill container because hers broke. This writer reaches out to Ludmila SNEED to see if she has any extra weekly pill containers.     BILLABLE  Physical Health:  Patient states she has been doing well at dialysis the last couple days and states it has been easier to get to her new dry weight.     Medications:  Review:  Patient's current medications with their current doses are all together in one bag that she states she uses to keep them all together, despite having other medications bottles around her house.   This writer did a med rec with the patient. See med list below:      Current Outpatient Medications on File Prior to Visit   Medication Sig Dispense Refill    acetaminophen (Tylenol) 325 mg tablet Take 2 tablets (650 mg) by mouth every 8 hours if needed for headaches, mild pain (1 - 3) or fever (temp greater than 38.0 C). 30 tablet 0    albuterol (ProAir HFA) 90  mcg/actuation inhaler Inhale 2 puffs every 4 hours if needed for wheezing or shortness of breath. 18 g 5    albuterol 1.25 mg/3 mL nebulizer solution Take 3 mL (1.25 mg) by nebulization every 6 hours if needed for wheezing or shortness of breath. 90 mL 11    apixaban (Eliquis) 5 mg tablet Take 1 tablet (5 mg) by mouth 2 times a day. 60 tablet 0    aspirin 81 mg EC tablet Take 1 tablet (81 mg) by mouth once daily.      atorvastatin (Lipitor) 80 mg tablet Take 1 tablet (80 mg) by mouth once daily at bedtime. 30 tablet 0    bisacodyl (Dulcolax) 10 mg suppository Insert 1 suppository (10 mg) into the rectum once daily as needed for constipation. 10 suppository 0    carvedilol (Coreg) 25 mg tablet Take 2 tablets (50mg) by mouth twice a day with meals.  Do not hold on dialysis days. 360 tablet 3    cloNIDine (Catapres) 0.1 mg tablet Take 2 tablets (0.2 mg) by mouth once daily at bedtime. -  hold hydralazine and clonidine the morning of Dialysis. 60 tablet 0    dicyclomine (Bentyl) 10 mg capsule Take 1 capsule (10 mg) by mouth 2 times a day as needed (Abdominal cramps). 20 capsule 0    dilTIAZem CD (Cardizem CD) 360 mg 24 hr capsule Take 1 capsule (360 mg) by mouth once daily. 30 capsule 0    diphenhydrAMINE (BENADryl) 25 mg capsule Take 2 capsules (50 mg) by mouth every 6 hours if needed for itching. 20 capsule 0    epoetin joceline (Epogen,Procrit) 10,000 unit/mL injection Inject 1 mL (10,000 Units) under the skin 3 times a week.      fluticasone (Flonase) 50 mcg/actuation nasal spray Administer 2 sprays into each nostril once daily. Shake gently. Before first use, prime pump. After use, clean tip and replace cap. 16 g 12    fluticasone furoate-vilanteroL (Breo Ellipta) 100-25 mcg/dose inhaler Inhale 1 puff once daily. 60 each 11    gabapentin (Neurontin) 300 mg capsule Take 1 capsule (300 mg) by mouth once daily at bedtime. 30 capsule 0    hydrALAZINE (Apresoline) 100 mg tablet Take 1 tablet by mouth every 12 hours. 180  tablet 0    pantoprazole (ProtoNix) 40 mg EC tablet Take 1 tablet (40 mg) by mouth if needed (not regular take it). Do not crush, chew, or split. 30 tablet 0    polyethylene glycol (Glycolax, Miralax) 17 gram/dose powder Mix 17 g of powder and drink once daily as needed for constipation.      sevelamer carbonate (Renvela) 800 mg tablet Take 1 tablet (800 mg) by mouth 3 times daily (morning, midday, late afternoon). Swallow tablet whole; do not crush, break, or chew. 90 tablet 0    torsemide (Demadex) 100 mg tablet Take 1 tablet by mouth as directed. Take NON-dialysis days 13 tablet 1    valsartan (Diovan) 320 mg tablet Take 1 tablet by mouth once daily. 90 tablet 0    vitamin B complex-vitamin C-folic acid (Renal Caps) 1 mg capsule Take 1 capsule by mouth every day. 90 capsule 3    vitamin E 90 mg (200 unit) capsule Take 1 capsule (200 Units) by mouth once daily. 90 capsule 3    albuterol 90 mcg/actuation inhaler Inhale 2 puffs as instructed every 6 hours as needed 18 g 1    carvedilol (Coreg) 25 mg tablet Take 2 tablets by mouth two times a day with meals. 360 tablet 0    diclofenac sodium (Voltaren) 1 % gel Apply 4.5 inches (4 g) topically 4 times a day as needed (back pain). (Patient not taking: Reported on 3/7/2025) 200 g 0    diphenhydrAMINE (BENADryl) 50 mg capsule Take one capsule by mouth 1 hour prior to contrast injection. 1 capsule 0    hydrALAZINE (Apresoline) 100 mg tablet Take 1 tablet (100 mg) by mouth 3 times a day. hold hydralazine and clonidine the morning of Dialysis. 90 tablet 0    NIFEdipine ER (NIFEdipine XL) 90 mg 24 hr tablet Take 1 tablet by mouth once daily. (Patient not taking: Reported on 3/7/2025) 90 tablet 0    predniSONE (Deltasone) 20 mg tablet Take two and one half tablets by mouth at 13 hours, 7 hours, and 1 hour prior to contrast injection (Patient not taking: Reported on 3/7/2025) 8 tablet 0    SUMAtriptan (Imitrex) 25 mg tablet Take 1 tablet (25 mg) by mouth 1 time if needed for  migraine. May repeat dose once in 2 hours if no relief.  Do not exceed 2 doses in 24 hours. 9 tablet 2    valsartan (Diovan) 320 mg tablet Take 1 tablet (320 mg) by mouth once daily in the evening. 30 tablet 0     Current Facility-Administered Medications on File Prior to Visit   Medication Dose Route Frequency Provider Last Rate Last Admin    [DISCONTINUED] acetaminophen (Tylenol) tablet  650 mg oral q6h PRN External Data Provider Generic        [DISCONTINUED] apixaban (Eliquis) tablet  5 mg oral BID External Data Provider Generic        [DISCONTINUED] atorvastatin (Lipitor) tablet  80 mg oral  External Data Provider Generic        [DISCONTINUED] carvedilol (Coreg) tablet  50 mg oral  External Data Provider Generic        [DISCONTINUED] gabapentin (Neurontin) capsule  100 mg oral  External Data Provider Generic        [DISCONTINUED] GENERIC EXTERNAL MEDICATION     External Data Provider Generic        [DISCONTINUED] hydrALAZINE (Apresoline) tablet  50 mg oral BID External Data Provider Generic        [DISCONTINUED] hydrOXYzine HCL (Atarax) tablet  25 mg oral q6h PRN External Data Provider Generic        [DISCONTINUED] ipratropium-albuteroL (Duo-Neb) 0.5-2.5 mg/3 mL nebulizer solution  3 mL inhalation q4h PRN External Data Provider Generic        [DISCONTINUED] miconazole (Micotin) 2 % vaginal cream  1 applicator vaginal  External Data Provider Generic        [DISCONTINUED] NIFEdipine ER (Adalat CC) 24 hr tablet  60 mg oral Daily External Data Provider Generic        [DISCONTINUED] ondansetron (Zofran) injection  4 mg intravenous q6h PRN External Data Provider Generic        [DISCONTINUED] pantoprazole (ProtoNix) EC tablet  40 mg oral  External Data Provider Generic        [DISCONTINUED] sodium chloride 0.9% infusion  20 mL intravenous  External Data Provider Generic        [DISCONTINUED] traMADol (Ultram) tablet  50 mg oral q12h PRN External Data Provider Generic        [DISCONTINUED] valsartan (Diovan) tablet  80  mg oral  External Data Provider Generic        [DISCONTINUED] vitamin B complex-vitamin C-folic acid (Nephrocaps) capsule  1 capsule oral Daily External Data Provider Generic                         Roberta Gallego RN

## 2025-03-07 NOTE — PROGRESS NOTES
Stacey Heath  Age: 54 y.o.  MRN: [id number]  Date: 3/7/2025  Location of service: in community    Program Details  Medicaid Community Clinical Case Management  Status: Enrolled  Effective Dates: 10/17/2023 - present  Responsible Staff: NAREN Davidson      Goals Reviewed:  Problem: Access to Care Issue       Goal: Assess and Address Access Barriers       Priority: Medium        Problem: Anxiety       Goal: Maintain coping skills for continuous improvement       Priority: Medium        Problem: Financial Stressors       Goal: Assistance with financial concerns         Problem: Kidney Issues       Goal: Improve health to get on kidney transplant list       Priority: High        Problem: Medication Adherence       Goal: Adherence to Medication Regimen       Priority: High        Problem: Negative Experience, Conflict with, or Distrust of Providers and/or Health System       Goal: Plan to Address Patient Specific Negative Experience, Distrust, or Conflict with Providers and/or Health System       Priority: High        Problem: Risk of Uncoordinated Care       Goal: Care will be Coordinated and Supported by a Multidisciplinary Team of Providers       Priority: High          Summary:  This provider met with patient at the patient's home. This provider listened with empathy as patient talked about her recent visit to the hospital. This provider talked through patient's feelings with her. This provider also confirmed that patient has all of her needed medication and has been taking them as instructed. This provider assisted patient with scheduling transportation for dialysis from March 25th-April 12th. Provider also secured transportation for patient to get to her PCP appointment on March 28th, 2025. Patient reported to provider that Dr. English switched her dry weight to 55 instead of 52kg.                      NAREN Davidson

## 2025-03-09 DIAGNOSIS — E78.5 HYPERLIPIDEMIA, UNSPECIFIED HYPERLIPIDEMIA TYPE: ICD-10-CM

## 2025-03-09 PROCEDURE — RXMED WILLOW AMBULATORY MEDICATION CHARGE

## 2025-03-09 RX ORDER — ATORVASTATIN CALCIUM 80 MG/1
80 TABLET, FILM COATED ORAL NIGHTLY
Qty: 90 TABLET | Refills: 3 | Status: SHIPPED | OUTPATIENT
Start: 2025-03-09 | End: 2026-03-09

## 2025-03-12 ENCOUNTER — PHARMACY VISIT (OUTPATIENT)
Dept: PHARMACY | Facility: CLINIC | Age: 55
End: 2025-03-12
Payer: MEDICARE

## 2025-03-14 ENCOUNTER — DOCUMENTATION (OUTPATIENT)
Dept: BEHAVIORAL HEALTH | Facility: CLINIC | Age: 55
End: 2025-03-14
Payer: COMMERCIAL

## 2025-03-14 DIAGNOSIS — F41.9 ANXIETY: ICD-10-CM

## 2025-03-16 NOTE — PROGRESS NOTES
Stacey Heath  Age: 54 y.o.  MRN: [id number]  Date: 3/14/2025  Location of service: in community    Program Details  Medicaid Community Clinical Case Management  Status: Enrolled  Effective Dates: 10/17/2023 - present  Responsible Staff: NAREN Davidson      Goals Reviewed:  Problem: Access to Care Issue       Goal: Assess and Address Access Barriers       Priority: Medium        Problem: Anxiety       Goal: Maintain coping skills for continuous improvement       Priority: Medium          Problem: Risk of Uncoordinated Care       Goal: Care will be Coordinated and Supported by a Multidisciplinary Team of Providers       Priority: High          Summary:  This provider met with patient at the patient's home. This provider listened with empathy as patient talked about being late to dialysis due to having to depend on [employer] for transportation and the amount of anxiety is causes.This provider inquired about how dialysis is going for the patient and if she is able to get the recommended fluid off. Patient agrees to being able to get the fluid off with limited physical cramping. This provider also challenged patient to talk about her health symptoms that cause the patient to have to be transported to the ED. Provider asked follow up questions regarding other health conditions that patient is having. Provider offered to stop by the [facility] when patient is receiving dialysis to see if a meeting can be held with the [facility]  in order to get patient's dialysis location switched to the [facility].                    NAREN Davidson

## 2025-03-17 ENCOUNTER — DOCUMENTATION (OUTPATIENT)
Dept: BEHAVIORAL HEALTH | Facility: CLINIC | Age: 55
End: 2025-03-17
Payer: COMMERCIAL

## 2025-03-17 DIAGNOSIS — J44.1 COPD EXACERBATION (MULTI): ICD-10-CM

## 2025-03-19 ENCOUNTER — DOCUMENTATION (OUTPATIENT)
Dept: BEHAVIORAL HEALTH | Facility: CLINIC | Age: 55
End: 2025-03-19
Payer: COMMERCIAL

## 2025-03-19 DIAGNOSIS — F41.9 ANXIETY: ICD-10-CM

## 2025-03-19 NOTE — PROGRESS NOTES
Stacey Heath  Age: 54 y.o.  MRN: [id number]  Date: 3/19/2025  Location of service: in community    Program Details  Medicaid Community Clinical Case Management  Status: Enrolled  Effective Dates: 10/17/2023 - present  Responsible Staff: NAREN Davidson      Goals Reviewed:  Problem: Access to Care Issue       Goal: Assess and Address Access Barriers       Priority: Medium        Problem: Anxiety       Goal: Maintain coping skills for continuous improvement       Priority: Medium        Problem: Financial Stressors       Goal: Assistance with financial concerns               Summary:  This provider met with patient at patient's home. This provider listened with empathy as patient talked about the recent passing of a loved one. This provider assisted patient with working through those feelings. Provider inquired about scheduling patient a Pulmonology appointment and rescheduling the sleep study that was missed due to the patient being hospitalized. Provider was able to make contact with [facility] so that patient could complete her interview with [facility] to re-establish her S[employer] benefits.                      NAREN Davidson

## 2025-03-24 NOTE — PROGRESS NOTES
Stacey Heath  Age: 54 y.o.  MRN: [id number]  Date: 3/17/2025  Location of service: phone call    Program Details  Medicaid Community Clinical Case Management  Status: Enrolled  Effective Dates: 10/17/2023 - present  Responsible Staff: NAREN Davidson      Goals Reviewed:      Summary:  This writer called patient twice to confirm our appointment for today. Patient was unable to answer this writer left voicemail. Patient did not answer her door at the scheduled appointment time. This writer called again and left a voicemail.    Appointment start time: 1329  Appointment completion time: 1334  Total time spent with patient (in minutes): 5  Non-Billable Time: 5  Billable Time Total: 0    Roberta Galelgo RN

## 2025-04-02 ENCOUNTER — DOCUMENTATION (OUTPATIENT)
Dept: BEHAVIORAL HEALTH | Facility: CLINIC | Age: 55
End: 2025-04-02
Payer: COMMERCIAL

## 2025-04-02 DIAGNOSIS — F41.9 ANXIETY: ICD-10-CM

## 2025-04-03 ENCOUNTER — CLINICAL SUPPORT (OUTPATIENT)
Dept: EMERGENCY MEDICINE | Facility: HOSPITAL | Age: 55
End: 2025-04-03
Payer: COMMERCIAL

## 2025-04-03 ENCOUNTER — DOCUMENTATION (OUTPATIENT)
Dept: BEHAVIORAL HEALTH | Facility: CLINIC | Age: 55
End: 2025-04-03
Payer: COMMERCIAL

## 2025-04-03 ENCOUNTER — HOSPITAL ENCOUNTER (EMERGENCY)
Facility: HOSPITAL | Age: 55
Discharge: HOME | End: 2025-04-03
Attending: EMERGENCY MEDICINE
Payer: COMMERCIAL

## 2025-04-03 ENCOUNTER — APPOINTMENT (OUTPATIENT)
Dept: RADIOLOGY | Facility: HOSPITAL | Age: 55
End: 2025-04-03
Payer: COMMERCIAL

## 2025-04-03 VITALS
TEMPERATURE: 98.4 F | DIASTOLIC BLOOD PRESSURE: 90 MMHG | HEIGHT: 55 IN | SYSTOLIC BLOOD PRESSURE: 193 MMHG | OXYGEN SATURATION: 100 % | BODY MASS INDEX: 27.77 KG/M2 | WEIGHT: 120 LBS | HEART RATE: 91 BPM | RESPIRATION RATE: 18 BRPM

## 2025-04-03 DIAGNOSIS — R09.89 PULMONARY CONGESTION: ICD-10-CM

## 2025-04-03 DIAGNOSIS — R06.02 SHORTNESS OF BREATH: Primary | ICD-10-CM

## 2025-04-03 LAB
ALBUMIN SERPL BCP-MCNC: 3.7 G/DL (ref 3.4–5)
ALP SERPL-CCNC: 104 U/L (ref 33–110)
ALT SERPL W P-5'-P-CCNC: 24 U/L (ref 7–45)
ANION GAP SERPL CALC-SCNC: 20 MMOL/L (ref 10–20)
AST SERPL W P-5'-P-CCNC: 24 U/L (ref 9–39)
ATRIAL RATE: 97 BPM
BASOPHILS # BLD AUTO: 0.07 X10*3/UL (ref 0–0.1)
BASOPHILS NFR BLD AUTO: 1.5 %
BILIRUB SERPL-MCNC: 0.4 MG/DL (ref 0–1.2)
BNP SERPL-MCNC: 1742 PG/ML (ref 0–99)
BUN SERPL-MCNC: 47 MG/DL (ref 6–23)
CALCIUM SERPL-MCNC: 9.9 MG/DL (ref 8.6–10.6)
CARDIAC TROPONIN I PNL SERPL HS: 46 NG/L (ref 0–34)
CARDIAC TROPONIN I PNL SERPL HS: 50 NG/L (ref 0–34)
CHLORIDE SERPL-SCNC: 100 MMOL/L (ref 98–107)
CO2 SERPL-SCNC: 25 MMOL/L (ref 21–32)
CREAT SERPL-MCNC: 8.66 MG/DL (ref 0.5–1.05)
EGFRCR SERPLBLD CKD-EPI 2021: 5 ML/MIN/1.73M*2
EOSINOPHIL # BLD AUTO: 0.42 X10*3/UL (ref 0–0.7)
EOSINOPHIL NFR BLD AUTO: 9.1 %
ERYTHROCYTE [DISTWIDTH] IN BLOOD BY AUTOMATED COUNT: 14.6 % (ref 11.5–14.5)
FLUAV RNA RESP QL NAA+PROBE: NOT DETECTED
FLUBV RNA RESP QL NAA+PROBE: NOT DETECTED
GLUCOSE SERPL-MCNC: 92 MG/DL (ref 74–99)
HCT VFR BLD AUTO: 28.3 % (ref 36–46)
HGB BLD-MCNC: 9.2 G/DL (ref 12–16)
HOLD SPECIMEN: NORMAL
HOLD SPECIMEN: NORMAL
IMM GRANULOCYTES # BLD AUTO: 0.02 X10*3/UL (ref 0–0.7)
IMM GRANULOCYTES NFR BLD AUTO: 0.4 % (ref 0–0.9)
LYMPHOCYTES # BLD AUTO: 1.16 X10*3/UL (ref 1.2–4.8)
LYMPHOCYTES NFR BLD AUTO: 25.2 %
MCH RBC QN AUTO: 30 PG (ref 26–34)
MCHC RBC AUTO-ENTMCNC: 32.5 G/DL (ref 32–36)
MCV RBC AUTO: 92 FL (ref 80–100)
MONOCYTES # BLD AUTO: 0.48 X10*3/UL (ref 0.1–1)
MONOCYTES NFR BLD AUTO: 10.4 %
NEUTROPHILS # BLD AUTO: 2.46 X10*3/UL (ref 1.2–7.7)
NEUTROPHILS NFR BLD AUTO: 53.4 %
NRBC BLD-RTO: 0 /100 WBCS (ref 0–0)
P AXIS: 45 DEGREES
P OFFSET: 188 MS
P ONSET: 141 MS
PLATELET # BLD AUTO: 227 X10*3/UL (ref 150–450)
POTASSIUM SERPL-SCNC: 4.9 MMOL/L (ref 3.5–5.3)
PR INTERVAL: 154 MS
PROT SERPL-MCNC: 6.6 G/DL (ref 6.4–8.2)
Q ONSET: 218 MS
QRS COUNT: 16 BEATS
QRS DURATION: 76 MS
QT INTERVAL: 356 MS
QTC CALCULATION(BAZETT): 452 MS
QTC FREDERICIA: 417 MS
R AXIS: 38 DEGREES
RBC # BLD AUTO: 3.07 X10*6/UL (ref 4–5.2)
RSV RNA RESP QL NAA+PROBE: NOT DETECTED
SARS-COV-2 RNA RESP QL NAA+PROBE: NOT DETECTED
SODIUM SERPL-SCNC: 140 MMOL/L (ref 136–145)
T AXIS: 206 DEGREES
T OFFSET: 396 MS
VENTRICULAR RATE: 97 BPM
WBC # BLD AUTO: 4.6 X10*3/UL (ref 4.4–11.3)

## 2025-04-03 PROCEDURE — 71046 X-RAY EXAM CHEST 2 VIEWS: CPT

## 2025-04-03 PROCEDURE — 99285 EMERGENCY DEPT VISIT HI MDM: CPT | Mod: 25 | Performed by: EMERGENCY MEDICINE

## 2025-04-03 PROCEDURE — 71046 X-RAY EXAM CHEST 2 VIEWS: CPT | Performed by: SURGERY

## 2025-04-03 PROCEDURE — 99285 EMERGENCY DEPT VISIT HI MDM: CPT | Performed by: EMERGENCY MEDICINE

## 2025-04-03 PROCEDURE — 85025 COMPLETE CBC W/AUTO DIFF WBC: CPT

## 2025-04-03 PROCEDURE — 84484 ASSAY OF TROPONIN QUANT: CPT

## 2025-04-03 PROCEDURE — 36415 COLL VENOUS BLD VENIPUNCTURE: CPT

## 2025-04-03 PROCEDURE — 83880 ASSAY OF NATRIURETIC PEPTIDE: CPT

## 2025-04-03 PROCEDURE — 2500000002 HC RX 250 W HCPCS SELF ADMINISTERED DRUGS (ALT 637 FOR MEDICARE OP, ALT 636 FOR OP/ED)

## 2025-04-03 PROCEDURE — 94640 AIRWAY INHALATION TREATMENT: CPT

## 2025-04-03 PROCEDURE — 80053 COMPREHEN METABOLIC PANEL: CPT

## 2025-04-03 PROCEDURE — 87637 SARSCOV2&INF A&B&RSV AMP PRB: CPT

## 2025-04-03 PROCEDURE — 93005 ELECTROCARDIOGRAM TRACING: CPT

## 2025-04-03 RX ORDER — IPRATROPIUM BROMIDE AND ALBUTEROL SULFATE 2.5; .5 MG/3ML; MG/3ML
3 SOLUTION RESPIRATORY (INHALATION) EVERY 20 MIN
Status: COMPLETED | OUTPATIENT
Start: 2025-04-03 | End: 2025-04-03

## 2025-04-03 RX ADMIN — IPRATROPIUM BROMIDE AND ALBUTEROL SULFATE 3 ML: .5; 3 SOLUTION RESPIRATORY (INHALATION) at 02:31

## 2025-04-03 RX ADMIN — IPRATROPIUM BROMIDE AND ALBUTEROL SULFATE 3 ML: .5; 3 SOLUTION RESPIRATORY (INHALATION) at 02:00

## 2025-04-03 RX ADMIN — IPRATROPIUM BROMIDE AND ALBUTEROL SULFATE 3 ML: .5; 3 SOLUTION RESPIRATORY (INHALATION) at 01:42

## 2025-04-03 ASSESSMENT — LIFESTYLE VARIABLES
EVER FELT BAD OR GUILTY ABOUT YOUR DRINKING: NO
EVER HAD A DRINK FIRST THING IN THE MORNING TO STEADY YOUR NERVES TO GET RID OF A HANGOVER: NO
HAVE YOU EVER FELT YOU SHOULD CUT DOWN ON YOUR DRINKING: NO
HAVE PEOPLE ANNOYED YOU BY CRITICIZING YOUR DRINKING: NO
TOTAL SCORE: 0

## 2025-04-03 ASSESSMENT — COLUMBIA-SUICIDE SEVERITY RATING SCALE - C-SSRS
6. HAVE YOU EVER DONE ANYTHING, STARTED TO DO ANYTHING, OR PREPARED TO DO ANYTHING TO END YOUR LIFE?: NO
2. HAVE YOU ACTUALLY HAD ANY THOUGHTS OF KILLING YOURSELF?: NO
1. IN THE PAST MONTH, HAVE YOU WISHED YOU WERE DEAD OR WISHED YOU COULD GO TO SLEEP AND NOT WAKE UP?: NO

## 2025-04-03 ASSESSMENT — PAIN DESCRIPTION - LOCATION: LOCATION: CHEST

## 2025-04-03 ASSESSMENT — PAIN DESCRIPTION - PAIN TYPE: TYPE: ACUTE PAIN

## 2025-04-03 ASSESSMENT — PAIN SCALES - GENERAL
PAINLEVEL_OUTOF10: 7
PAINLEVEL_OUTOF10: 6

## 2025-04-03 ASSESSMENT — PAIN DESCRIPTION - DESCRIPTORS: DESCRIPTORS: TIGHTNESS

## 2025-04-03 ASSESSMENT — PAIN - FUNCTIONAL ASSESSMENT: PAIN_FUNCTIONAL_ASSESSMENT: 0-10

## 2025-04-03 ASSESSMENT — PAIN DESCRIPTION - FREQUENCY: FREQUENCY: CONSTANT/CONTINUOUS

## 2025-04-03 NOTE — DISCHARGE INSTRUCTIONS
You were seen in the emergency department for shortness of breath. We obtained multiple labs which appear to be consistent with your normal levels. No signs of infection, no need for blood transfusions. We did do a chest x-ray which shows fluid on your lungs, which will need to be removed with Dialysis. You should follow up at your dialysis center for your scheduled appointment today at 11am. You are welcome to return to the emergency department if your symptoms do not resolve following your dialysis session, or for any other concerns. Your oxygen saturations while on room air were normal at 100%.

## 2025-04-03 NOTE — ED PROVIDER NOTES
History of Present Illness     History provided by: Patient  Limitations to History: None  External Records Reviewed with Brief Summary: Discharge Summary from 3/26/2025 which showed patient admitted for shortness of breath and hypertensive heart failure with ESRD.  At that time patient had missed dialysis sessions.  She received 2 consecutive dialysis sessions while inpatient.  and Radiology report of TTE from 01/06/2025 which showed EF of 55-60%.     HPI:  Stacey Heath is a 54 y.o. female has medical history of the ESRD on Tuesday Thursday Saturday dialysis, CHF, ABD, asthma presenting to the ED for evaluation of shortness of breath which onset several hours ago.  Patient feels like she has fluid in her chest that she is not able to get out.  Patient denies any chest pain, but does endorse chest pressure from the fluid..  She endorses chills at home but no fever.  Patient has been experiencing congestion runny nose and chills for the past 2 days.  Patient feels like she cannot cough anything up.  Is worried because she had fluid on her lungs previously that had to be treated with earlier dialysis. Patient states she was trying to wait until her scheduled dialysis session later this morning, but was worried that she was getting it worse overnight.  Patient did receive almost a full session of dialysis on Tuesday, states was stopped about 30 minutes short due to her blood pressure being too low.     Physical Exam   Triage vitals:  T 36.9 °C (98.4 °F)  HR 99  /84  RR 18  O2 96 % None (Room air)    General: Awake, alert, in no acute distress  Eyes: Gaze conjugate.  No scleral icterus or injection  HENT: Normo-cephalic, atraumatic. No stridor  CV: Regular rate, regular rhythm. Radial pulses 2+ bilaterally  Resp: Breathing non-labored, speaking in full sentences.  Clear to auscultation, though decreased in the bilateral bases. No wheezing appreciated.   GI: Soft, non-distended, non-tender. No rebound or  guarding.  MSK/Extremities: No gross bony deformities. Moving all extremities  Skin: Warm. Appropriate color  Neuro: Alert. Oriented. Face symmetric. Speech is fluent.  Gross strength and sensation intact in b/l UE and LEs  Psych: Appropriate mood and affect    Manage breath sounds at the bases bilaterally.  No wheezing appreciated.    Medical Decision Making & ED Course   Medical Decision Makin y.o. female presenting to the ED for evaluation of shortness of breath. Patient not exhibiting wheezing, however breath sounds are diminished in the bases. Will trial duonebs given patient's history of copd/asthma with reassessment of lung exam for potential wheezing. Picture does not sound like a COPD/asthma component. Likely a component of pulmonary edema given patient's history of CHF with ESRD, due for dialysis in the AM. Will obtain viral swabs due to patient's recent history of URI symptoms with chills, congestion, runny nose. Patient remains afebrile without tachypnea, no tachycardia noted. Troponins obtained due to patient endorsing chest pressure due to the extra fluid she feels are in her lungs/throat.     CBC revealed no leukocytosis, Anemia with Hgb of 9.2, though this is increased from previous value from one month ago. COVID/Flu/RSV negative. Initial troponin elevated at 50. Repeat decreased to 46. BNP elevated to 1742. Patient reevaluated following duonebs, rales present in the bases. Still saturating 100% on 2L NC. Plan to have patient perform walking pulse ox and reassess based on results.     Patient completed ambulatory pulse ox on room air with saturations of 100% throughout ambulation. Patient endorses persistent symptoms of shortness of breath despite saturations which are elevated. Discussed that this is due to her fluid overload which will likely only improve following dialysis as she makes minimal urine. Patient agreeable to discharge with outpatient dialysis appointment as scheduled for today  at 11am. She endorses that her  will be able to pick her up from the hospital for transportation to her appointment.     ----    Differential diagnoses considered include but are not limited to: pulmonary edema, upper respiratory illness, pneumonia, ACS.       Social Determinants of Health which Significantly Impact Care: None identified     EKG Independent Interpretation: The EKG obtained at 0033 was independently interpreted by myself. It demonstrates sinus rhythm with a ventricular rate of 97. Normal axis. Intervals within normal limits. ST segments showed nonspecific ST changes as well as ST depressions inferiorly, which were seen on previous EKGs. No significant change when compared to prior EKG from 2/13/2025.    Independent Result Review and Interpretation: Relevant laboratory and radiographic results were reviewed and independently interpreted by myself.  As necessary, they are commented on in the ED Course.    Chronic conditions affecting the patient's care: As documented above in [facility]    The patient was discussed with the following consultants/services: None    Care Considerations: As documented above in [facility]    ED Course:  Diagnoses as of 04/03/25 0636   Shortness of breath   Pulmonary congestion     Disposition   As a result of the work-up, the patient was discharged home.  she was informed of her diagnosis and instructed to come back with any concerns or worsening of condition.  she and was agreeable to the plan as discussed above.  she was given the opportunity to ask questions.  All of the patient's questions were answered.    Procedures   Procedures    Patient seen and discussed with ED attending physician.    Krista Gonsales DO  Emergency Medicine     Krista Gonsales DO  Resident  04/03/25 0967

## 2025-04-03 NOTE — ED TRIAGE NOTES
Pt reports that she feels SOB like she may have too much fluid on her lungs, she also reports that her BP is too high. Pt is on hemodialysis on T, R, and S her last treatment was on Tuesday

## 2025-04-03 NOTE — PROGRESS NOTES
Stacey Heath  Age: 54 y.o.  MRN: [id number]  Date: 4/2/2025  Location of service: in community    Program Details  Medicaid Community Clinical Case Management  Status: Enrolled  Effective Dates: 10/17/2023 - present  Responsible Staff: NAREN Davidson      Goals Reviewed:  Problem: Access to Care Issue       Goal: Assess and Address Access Barriers       Priority: Medium        Problem: Anxiety       Goal: Maintain coping skills for continuous improvement       Priority: Medium          Problem: Kidney Issues       Goal: Improve health to get on kidney transplant list       Priority: High            Summary:  This provider met with patient at the patient's house. This provider listened with empathy as patient talked about what contributed to patient's recent hospital stay. Patient explained that she was sent home from dialysis due to having stomach issues before patient was able to complete dialysis. This cause patient to have fluid build up and that evening, she had to call the ambulance for difficulty breathing. Patient had feared this would happen. This provider also discussed with patient the patient's plan for when patient will be home for a week by herself this summer. The provider brainstormed ideas with the patient. This provider recognized the positive things the patient is currently doing in order to try and get back on the transplant list. Provider inquired about how the patient is feeling since the patient had a loved one pass away from kidney disease.                    NAREN Davidson

## 2025-04-03 NOTE — PROGRESS NOTES
Stacey Heath  Age: 54 y.o.  MRN: [id number]  Date: 4/3/2025  Location of service: phone call    Program Details  Medicaid Community Clinical Case Management  Status: Enrolled  Effective Dates: 10/17/2023 - present  Responsible Staff: NAREN Davidson      Goals Reviewed:      Summary:  This writer called patient to get patient scheduled for another appointment with this writer.  This writer and patient scheduled an appointment at this time.    Appointment start time: 1337  Appointment completion time: 1340  Total time spent with patient (in minutes): 3  Non-Billable Time: 3  Billable Time Total: 0    Roberta Gallego RN

## 2025-04-07 ENCOUNTER — DOCUMENTATION (OUTPATIENT)
Dept: BEHAVIORAL HEALTH | Facility: CLINIC | Age: 55
End: 2025-04-07
Payer: COMMERCIAL

## 2025-04-09 ENCOUNTER — DOCUMENTATION (OUTPATIENT)
Dept: BEHAVIORAL HEALTH | Facility: CLINIC | Age: 55
End: 2025-04-09
Payer: COMMERCIAL

## 2025-04-09 NOTE — PROGRESS NOTES
Stacey Haeth  Age: 54 y.o.  MRN: [id number]  Date: 4/9/2025  Location of service: phone call    Program Details  Medicaid Community Clinical Case Management  Status: Enrolled  Effective Dates: 10/17/2023 - present  Responsible Staff: NAREN Davidson      Goals Reviewed:  Problem: Access to Care Issue       Goal: Assess and Address Access Barriers       Priority: Medium        Problem: Anxiety       Goal: Maintain coping skills for continuous improvement       Priority: Medium        Problem: Financial Stressors       Goal: Assistance with financial concerns         Problem: Kidney Issues       Goal: Improve health to get on kidney transplant list       Priority: High        Problem: Medication Adherence       Goal: Adherence to Medication Regimen       Priority: High        Problem: Negative Experience, Conflict with, or Distrust of Providers and/or Health System       Goal: Plan to Address Patient Specific Negative Experience, Distrust, or Conflict with Providers and/or Health System       Priority: High        Problem: Risk of Uncoordinated Care       Goal: Care will be Coordinated and Supported by a Multidisciplinary Team of Providers       Priority: High          Summary:  This provider attempted to make contact with patient via telephone call to confirm our appointment for today. Provider received a recording that patient's phone could not take calls at this time.                      NAREN Davidson

## 2025-04-11 ENCOUNTER — DOCUMENTATION (OUTPATIENT)
Dept: BEHAVIORAL HEALTH | Facility: CLINIC | Age: 55
End: 2025-04-11
Payer: COMMERCIAL

## 2025-04-15 ENCOUNTER — DOCUMENTATION (OUTPATIENT)
Dept: BEHAVIORAL HEALTH | Facility: CLINIC | Age: 55
End: 2025-04-15
Payer: COMMERCIAL

## 2025-04-15 DIAGNOSIS — F41.9 ANXIETY: ICD-10-CM

## 2025-04-15 NOTE — PROGRESS NOTES
Stacey Heath  Age: 54 y.o.  MRN: [id number]  Date: 4/15/2025  Location of service: phone call    Program Details  Medicaid Community Clinical Case Management  Status: Enrolled  Effective Dates: 10/17/2023 - present  Responsible Staff: NAREN Davidson      Goals Reviewed:  Problem: Access to Care Issue       Goal: Assess and Address Access Barriers       Priority: Medium        Problem: Anxiety       Goal: Maintain coping skills for continuous improvement       Priority: Medium          Problem: Kidney Issues       Goal: Improve health to get on kidney transplant list       Priority: High              Summary:  This provider was contact by patient via telephone call regarding needing transportation scheduled for dialysis. This provider call [employer] to put in a new order for transportation beginning April 17th, 25-July 26th, 2025. This provider also scheduled patient's transportation to an upcoming Pulmonology appointment on May 7th, 2025. Provider an patient agreed to meet this Wednesday for our weekly meeting.                      NAREN Davidson

## 2025-04-15 NOTE — PROGRESS NOTES
Stacey Heath  Age: 54 y.o.  MRN: [id number]  Date: 4/7/2025  Location of service: phone call and care coordination    Program Details  Medicaid Community Clinical Case Management  Status: Enrolled  Effective Dates: 10/17/2023 - present  Responsible Staff: NAREN Davidson      Goals Reviewed:  Problem: Risk of Uncoordinated Care       Goal: Care will be Coordinated and Supported by a Multidisciplinary Team of Providers       Priority: High          Summary:  This writer attempted to call the patient to confirm what days and times are good for her so this writer can get her scheduled with pulmonology.  Patient is unable to answer at this time as her phone appears to be off.  This writer calls  central scheduling to get patient scheduled for a pulmonology appointment.  Patient is scheduled at this time.  This writer will confirm the appointment time works for the patient at her next scheduled appointment.    Appointment start time: 1451  Appointment completion time: 1505  Total time spent with patient (in minutes): 14  Non-Billable Time: 14  Billable Time Total: 0    Roberta Gallego RN

## 2025-04-16 ENCOUNTER — DOCUMENTATION (OUTPATIENT)
Dept: BEHAVIORAL HEALTH | Facility: CLINIC | Age: 55
End: 2025-04-16
Payer: COMMERCIAL

## 2025-04-16 DIAGNOSIS — F41.9 ANXIETY: ICD-10-CM

## 2025-04-16 NOTE — PROGRESS NOTES
Stacey Heath  Age: 54 y.o.  MRN: [id number]  Date: 4/16/2025  Location of service: in community    Program Details  Medicaid Community Clinical Case Management  Status: Enrolled  Effective Dates: 10/17/2023 - present  Responsible Staff: NAREN Davidson      Goals Reviewed:  Problem: Access to Care Issue       Goal: Assess and Address Access Barriers       Priority: Medium        Problem: Anxiety       Goal: Maintain coping skills for continuous improvement       Priority: Medium              Summary:  This provider met with patient at the patient's home. This provider listened with empathy as patient reported not getting home from dialysis until after 7:30pm due to her transportation with [employer] running way late. Patient reported that her  time is 3:30pm and the [facility] closed at 4:30pm but the staff had to stay until patient was picked up. This provider confirmed with patient that she still felt safe while waiting to be picked up and that patient attempted to call [employer]. This provider discussed other transportation options such as [employer].  This  provider also followed up to see if patient needs assistance with her medical insurance to ensure that dialysis is cover. This provider discussed with patient the instructions regarding medication on dialysis days. Patient reports that she does not take any medications before dialysis and it seems to work in a way that patient is able to get through her entire dialysis treatment. This provider agreed to contact the  at the [facility] to see if patient's dialysis location can be switched to closer to home. Patient reported feeling like she is falling through the crack. Provider encouraged patient to further explain this feeling. Provider and patient came up with a plan to reduce these feelings which includes calling the  at the [facility], finding a sooner date for a PCP appointment and follow up with the Teams' RN Case  Manager about the Peritoneal dialysis evaluation.                    NAREN Davidson
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
## 2025-04-25 ENCOUNTER — DOCUMENTATION (OUTPATIENT)
Dept: BEHAVIORAL HEALTH | Facility: CLINIC | Age: 55
End: 2025-04-25
Payer: COMMERCIAL

## 2025-04-30 ENCOUNTER — DOCUMENTATION (OUTPATIENT)
Dept: BEHAVIORAL HEALTH | Facility: CLINIC | Age: 55
End: 2025-04-30
Payer: COMMERCIAL

## 2025-05-01 NOTE — PROGRESS NOTES
Stacey Heath  Age: 54 y.o.  MRN: [id number]  Date: 4/30/2025  Location of service: in community    Program Details  Medicaid Community Clinical Case Management  Status: Enrolled  Effective Dates: 10/17/2023 - present  Responsible Staff: NAREN Davidson      Goals Reviewed:  Problem: Access to Care Issue       Goal: Assess and Address Access Barriers       Priority: Medium        Problem: Anxiety       Goal: Maintain coping skills for continuous improvement       Priority: Medium        Problem: Financial Stressors       Goal: Assistance with financial concerns         Problem: Kidney Issues       Goal: Improve health to get on kidney transplant list       Priority: High        Problem: Medication Adherence       Goal: Adherence to Medication Regimen       Priority: High        Problem: Negative Experience, Conflict with, or Distrust of Providers and/or Health System       Goal: Plan to Address Patient Specific Negative Experience, Distrust, or Conflict with Providers and/or Health System       Priority: High        Problem: Risk of Uncoordinated Care       Goal: Care will be Coordinated and Supported by a Multidisciplinary Team of Providers       Priority: High          Summary:  This provider met with patient at [facility] in [place] where patient was admitted the day before for Pulmonary Epiderma. This provider walked into the patient's room as the doctor was giving the patient an update on their medical care plan for the patient. The doctor explained that there will be some changes to patient's blood pressure medication and if patient's blood pressure is able to be maintained, then the patient will be moved out of ICU to a regular room with the intentions of being discharged tomorrow after dialysis. This provider discussed with patient some of the future challenges regarding family health. This provider and patient agreed to call patient's medical insurance next week to question  certain benefits to include dual insurance coverage.                      Ludmila Fernández, NAREN

## 2025-05-03 NOTE — PROGRESS NOTES
Stacey Heath  Age: 54 y.o.  MRN: [id number]  Date: 4/25/2025  Location of service: in community    Program Details  Medicaid Community Clinical Case Management  Status: Enrolled  Effective Dates: 10/17/2023 - present  Responsible Staff: NAREN Davidson      Goals Reviewed:  Problem: Anxiety       Goal: Maintain coping skills for continuous improvement       Priority: Medium        Problem: Kidney Issues       Goal: Improve health to get on kidney transplant list       Priority: High        Problem: Medication Adherence       Goal: Adherence to Medication Regimen       Priority: High        Problem: Negative Experience, Conflict with, or Distrust of Providers and/or Health System       Goal: Plan to Address Patient Specific Negative Experience, Distrust, or Conflict with Providers and/or Health System       Priority: High        Problem: Risk of Uncoordinated Care       Goal: Care will be Coordinated and Supported by a Multidisciplinary Team of Providers       Priority: High          Summary:  This writer meets with patient in her home for a community visit.   This writer and patient discuss the importance of monitoring her blood pressure, patient states she understands and is eager to be able to take it, however she states her current blood pressure cuff is broken. This writer has reached out to [facility] about getting a new blood pressure cuff for the patient.   This writer discusses when it is best for patient to take her blood pressure, this writer suggests taking it when she takes her meds in the morning. Patient is agreeable to this and states she will log her blood pressures to keep track of them.   This writer assists patient with getting scheduled with a sooner primary care appointment. Patient is scheduled at this time.   This writer reminds patient of her pulmonology appointment on May 7th and stresses the importance of this appointment, patient verbalizes understanding and is eager  to go to the appointment. This writer plans to attend with the patient.    Appointment start time: 1556  Appointment completion time: 1655  Total time spent with patient (in minutes): 59  Non-Billable Time: 59  Billable Time Total: 0    Roberta Gallego RN]

## 2025-05-06 ENCOUNTER — DOCUMENTATION (OUTPATIENT)
Dept: BEHAVIORAL HEALTH | Facility: CLINIC | Age: 55
End: 2025-05-06
Payer: COMMERCIAL

## 2025-05-06 NOTE — PROGRESS NOTES
Stacey Heath  Age: 54 y.o.  MRN: [id number]  Date: 5/5/2025  Location of service: phone call    Program Details  Medicaid Community Clinical Case Management  Status: Enrolled  Effective Dates: 10/17/2023 - present  Responsible Staff: NAREN Davidson      Goals Reviewed:  Problem: Access to Care Issue       Goal: Assess and Address Access Barriers       Priority: Medium        Problem: Anxiety       Goal: Maintain coping skills for continuous improvement       Priority: Medium        Problem: Financial Stressors       Goal: Assistance with financial concerns         Problem: Kidney Issues       Goal: Improve health to get on kidney transplant list       Priority: High        Problem: Medication Adherence       Goal: Adherence to Medication Regimen       Priority: High        Problem: Negative Experience, Conflict with, or Distrust of Providers and/or Health System       Goal: Plan to Address Patient Specific Negative Experience, Distrust, or Conflict with Providers and/or Health System       Priority: High        Problem: Risk of Uncoordinated Care       Goal: Care will be Coordinated and Supported by a Multidisciplinary Team of Providers       Priority: High          Summary:  This provider made successful contact with patient via telephone call. This provider confirmed with patient that patient need for transportation to her appointment on Wednesday. This provider then contacted [employer] on behalf of patient to ensure that patient's transportation is in fact scheduled for the appropriate time and day for the Pulmonology appointment. The ride was confirmed by Lola, the representative for [employer].                      NAREN Davidson

## 2025-05-07 ENCOUNTER — DOCUMENTATION (OUTPATIENT)
Dept: BEHAVIORAL HEALTH | Facility: CLINIC | Age: 55
End: 2025-05-07

## 2025-05-07 ENCOUNTER — OFFICE VISIT (OUTPATIENT)
Dept: PULMONOLOGY | Facility: HOSPITAL | Age: 55
End: 2025-05-07
Payer: COMMERCIAL

## 2025-05-07 VITALS
TEMPERATURE: 97.3 F | SYSTOLIC BLOOD PRESSURE: 194 MMHG | RESPIRATION RATE: 17 BRPM | BODY MASS INDEX: 27.43 KG/M2 | HEART RATE: 84 BPM | OXYGEN SATURATION: 97 % | WEIGHT: 118 LBS | DIASTOLIC BLOOD PRESSURE: 67 MMHG

## 2025-05-07 DIAGNOSIS — R06.09 DOE (DYSPNEA ON EXERTION): Primary | ICD-10-CM

## 2025-05-07 DIAGNOSIS — G47.30 SLEEP APNEA, UNSPECIFIED TYPE: ICD-10-CM

## 2025-05-07 DIAGNOSIS — F41.9 ANXIETY: ICD-10-CM

## 2025-05-07 DIAGNOSIS — R06.02 SOB (SHORTNESS OF BREATH): ICD-10-CM

## 2025-05-07 DIAGNOSIS — J90 PLEURAL EFFUSION: ICD-10-CM

## 2025-05-07 DIAGNOSIS — J44.1 COPD EXACERBATION (MULTI): ICD-10-CM

## 2025-05-07 PROCEDURE — 99214 OFFICE O/P EST MOD 30 MIN: CPT | Performed by: NURSE PRACTITIONER

## 2025-05-07 PROCEDURE — 4010F ACE/ARB THERAPY RXD/TAKEN: CPT | Performed by: NURSE PRACTITIONER

## 2025-05-07 PROCEDURE — 3077F SYST BP >= 140 MM HG: CPT | Performed by: NURSE PRACTITIONER

## 2025-05-07 PROCEDURE — H2019 THER BEHAV SVC, PER 15 MIN: HCPCS | Mod: HE | Performed by: COMMUNITY/BEHAVIORAL HEALTH

## 2025-05-07 PROCEDURE — 3078F DIAST BP <80 MM HG: CPT | Performed by: NURSE PRACTITIONER

## 2025-05-07 RX ORDER — ALBUTEROL SULFATE 0.83 MG/ML
3 SOLUTION RESPIRATORY (INHALATION) ONCE
OUTPATIENT
Start: 2025-05-07 | End: 2025-05-07

## 2025-05-07 RX ORDER — ALBUTEROL SULFATE 90 UG/1
1 INHALANT RESPIRATORY (INHALATION) ONCE
OUTPATIENT
Start: 2025-05-07

## 2025-05-07 ASSESSMENT — PATIENT HEALTH QUESTIONNAIRE - PHQ9
SUM OF ALL RESPONSES TO PHQ9 QUESTIONS 1 AND 2: 0
2. FEELING DOWN, DEPRESSED OR HOPELESS: NOT AT ALL
1. LITTLE INTEREST OR PLEASURE IN DOING THINGS: NOT AT ALL

## 2025-05-07 ASSESSMENT — ENCOUNTER SYMPTOMS
FATIGUE: 1
EYE PAIN: 0
AGITATION: 0
HEADACHES: 0
MYALGIAS: 1
WEAKNESS: 0
DIARRHEA: 0
NAUSEA: 0
LOSS OF SENSATION IN FEET: 0
FEVER: 0
ABDOMINAL PAIN: 0
DIZZINESS: 0
PALPITATIONS: 0
DEPRESSION: 0
NERVOUS/ANXIOUS: 0
JOINT SWELLING: 0
OCCASIONAL FEELINGS OF UNSTEADINESS: 0
BACK PAIN: 0
VOICE CHANGE: 0
SINUS PRESSURE: 0
NUMBNESS: 0
ARTHRALGIAS: 0
VOMITING: 0
RHINORRHEA: 0

## 2025-05-07 ASSESSMENT — PAIN SCALES - GENERAL: PAINLEVEL_OUTOF10: 0-NO PAIN

## 2025-05-07 NOTE — PROGRESS NOTES
Patient: Stacey Heath    [id number]  : 1970 -- AGE 54 y.o.    Provider: LILLY Iniguez-CNP     Location [facility]   Service Date: 2025              [facility]  Follow up visit note      HISTORY OF PRESENT ILLNESS       HISTORY OF PRESENT ILLNESS     On today's visit, the patient reports She has been using breo daily -- has not noticed much improvement. She states she would have SOB - thought related to fluid status. She would be put on oxygen/ BIPAP in the ER and then gets dialysis. She states she has a sleep study scheduled upcoming.  She has PRN albuterol HFA - here and there. She has a nebulizer machine - does albuterol nebulizers twice daily. She has MATHEW with going up the stairs.  She has a cough here and there - not productive. She will have intermittent wheezing - normally before she gets sick.  She has MATHEW with walking long distances/ going up stairs.  She has intermittent SOB at rest. She has issues with seasonal allergies - has been needing her PRN flonase.  GERD with pretty good control on daily pantoprazole. She denies any CP. She is trying to get worked up for kidney transplant.     24 - Dr. Tafoya   This is a 54-year-old female patient with past medical history of end-stage renal disease on hemodialysis, UE DVT on Eliquis, HFpEF, hypertension, diabetes, coronary artery disease. Patient presented to the clinic for follow up for SOB.  Patient was seen in the clinic 3/18/2024, plan was to get CT scan of the chest to assess pleural effusion, and PFTs.   Unfortunately, patient needed to be admitted to CICU with hypertensive emergency. Was also treated for pneumonia.  Patient is doing well today, no SOB at rest but has MATHEW, no cough or chest pain.  She mentioned that she has stomach pain and chronic diarrhea, and would like to be seen by a gastroenterology for assessment.  She is an ex-smoker, started smoking at the age of 19, 5  cig a day, and quit 2 years ago.     ALLERGIES AND MEDICATIONS     ALLERGIES  Allergies[1]    MEDICATIONS  Current Medications[2]      PAST HISTORY     PAST MEDICAL HISTORY  - ESRD - on hemodialysis   - HFpEF   - HTN   - HLD   - UE DVT - on eliquis   - DMII   - CAD   - migraines    PAST SURGICAL HISTORY  Surgical History[3]    IMMUNIZATION HISTORY  Immunization History   Administered Date(s) Administered    Influenza, seasonal, injectable 09/30/2021    Moderna SARS-CoV-2 Vaccination 10/26/2021, 02/05/2022       SOCIAL HISTORY  Smoking: Former smoker - 20- 51- 0.5ppd   Alcohol: special occasions  Illicit drugs:  none     OCCUPATIONAL/ENVIRONMENTAL HISTORY  Disability - no jobs with exposures. Previously worked as a  at "[employer]. Previous manager at [employer].     FAMILY HISTORY  Asthma - Mom, Dad and two children    RESULTS/DATA     Pulmonary Function Test Results     9/13/24- FEV1/FVC 0.72/ FEV1 1.31 (66% no BD resp)/ FVC 1.66 (69%)/ TLC 2.82 (69%)/ DLCO 62%     Chest Radiograph     XR chest 1 view 04/29/2025  Impression  IMPRESSION:  1.  Interval decrease in the left pleural effusion.  2.  No significant right pleural effusion is seen on the current study.  3.  Central/perihilar hazy opacities and right basilar hazy opacity which  may represent areas of atelectasis, asymmetric pulmonary edema is in the  differential.      Chest CT Scan     Multiple previous     CT angio chest for pulmonary embolism 05/28/2024  Impression  1. No evidence of pulmonary emboli to the distal segmental level.  2. Unchanged small loculated left pleural effusion and associated  left lower lobe subsegmental consolidative opacity favoring to  represent atelectasis. There is a new subsegmental consolidation in  the right middle lobe. This may also represent atelectasis, however  developing pneumonia is not excluded. No pleural effusion on the  right.  3. Similar findings of left-sided cardiomegaly, interseptal  thickening, and mosaic  "attenuation of the lung fields as well as body  wall edema suggestive of fluid overload.  4. Hyperdense material throughout the thoracic esophagus similar to  prior suggestive of esophageal reflux.    I personally reviewed the images/study and I agree with the findings  as stated by Dr. Rainer Clark M.D. This study was interpreted at  [facility], [place], [place].      Echocardiogram     Echo:     1/3/25 -  The left ventricular systolic function is normal, with a visually estimated ejection fraction of 55-60%.   2. Left ventricular diastolic filling cannot be determined, due to severe mitral annular calcification (MAC).   3. No significant changes in aortic outflowwith Valsalva.   4. There is reduced right ventricular systolic function.   5. The left atrium is enlarged.   6. There is moderate to severe mitral annular calcification.    1/9/25 - The left ventricle was not well visualized. The left ventricular ejection fraction could not be measured.  2. Unable to determine right ventricular systolic function.  3. The left atrium is mildly dilated.  4. Limited fellow echo.    Labs/ Other testing      Eosinophils Absolute (x10*3/uL)   Date Value   04/03/2025 0.42   02/13/2025 0.29   01/25/2025 0.01     Eosinophils Absolute, Manual (x10*3/uL)   Date Value   06/29/2024 0.00     IgE (IU/mL)   Date Value   05/12/2023 227 (H)       Respiratory Culture  Lab Results   Component Value Date    GRAMSTAIN NO GRANULOCYTES OR ORGANISMS SEEN. 05/04/2023    GRAMSTAIN 1+ GRANULOCYTES. NO ORGANISMS SEEN. 05/04/2023    GRAMSTAIN NO GRANULOCYTES OR ORGANISMS SEEN. 05/04/2023    GRAMSTAIN NO GRANULOCYTES OR ORGANISMS SEEN. 05/04/2023     No results found for the last 90 days.      Fungal Culture  No results found for: \"FUNGALCULTSM\", \"FUNGALSMEAR\"    AFB Culture  No results found for: \"AFBCX\", \"AFBSTAIN\"      REVIEW OF SYSTEMS     REVIEW OF SYSTEMS  Review of Systems   Constitutional:  Positive for " fatigue. Negative for fever.   HENT:  Negative for congestion, postnasal drip, rhinorrhea, sinus pressure and voice change.    Eyes:  Negative for pain and visual disturbance.   Cardiovascular:  Negative for chest pain, palpitations and leg swelling.   Gastrointestinal:  Negative for abdominal pain, diarrhea, nausea and vomiting.   Endocrine: Negative for cold intolerance and heat intolerance.   Musculoskeletal:  Positive for myalgias. Negative for arthralgias, back pain and joint swelling.   Skin:  Negative for rash.   Neurological:  Negative for dizziness, weakness, numbness and headaches.   Psychiatric/Behavioral:  Negative for agitation. The patient is not nervous/anxious.          PHYSICAL EXAM     VITAL SIGNS: There were no vitals taken for this visit.     CURRENT WEIGHT: [unfilled]  BMI: [unfilled]  PREVIOUS WEIGHTS:  Wt Readings from Last 3 Encounters:   04/03/25 54.4 kg (120 lb)   02/13/25 57.6 kg (127 lb)   01/27/25 57.7 kg (127 lb 3.3 oz)       Physical Exam  Vitals reviewed.   Constitutional:       General: She is not in acute distress.     Appearance: Normal appearance. She is not ill-appearing or toxic-appearing.   HENT:      Head: Normocephalic.      Nose: No rhinorrhea.   Cardiovascular:      Rate and Rhythm: Normal rate and regular rhythm.      Heart sounds: Normal heart sounds.   Pulmonary:      Effort: Pulmonary effort is normal. No respiratory distress.      Breath sounds: Normal breath sounds. No stridor. No wheezing, rhonchi or rales.   Abdominal:      General: Abdomen is flat.   Musculoskeletal:         General: Normal range of motion.      Right lower leg: No edema.      Left lower leg: No edema.   Skin:     General: Skin is warm and dry.      Nails: There is no clubbing.   Neurological:      General: No focal deficit present.      Mental Status: She is alert and oriented to person, place, and time.   Psychiatric:         Mood and Affect: Mood normal.         Behavior: Behavior normal.          Judgment: Judgment normal.       ASSESSMENT/PLAN     SOB (shortness of breath): Mainly exertional in nature, could be due to HFpEF, volume overload from ESRD, or airway disease given the history of smoking. She is compliant with HD. Left thoracentesis was done 5/2023: 300 mL of serosanguineous, no analysis available. PFTs without obstruction - restricted with low DLCO. Previous LLL consolidation/ stable small pleural effusion.   - continue advair twice daily - rinse mouth out afterwards   - continue albuterol HFA inhaler 2 puffs or albuterol nebulizer treatment every 4-6 hours as needed for shortness of breath   - will get updated PFTs/ 6MWT/ O2 desat    - upcoming sleep study - will refer to sleep for evaluation post study     Thank you for visiting the Pulmonary clinic today!   Return to clinic 3 months or sooner if needed --> needs to be a physician - previously saw Dr. Michelet Laguna CNP  My office -  [phone]- Andres is my . Yvonne is my nurse [phone].   Radiology scheduling [phone]   Appointment scheduling [phone]   Pulmonary function testing - [phone]                 [1]   Allergies  Allergen Reactions    Iodine Hives, Itching and Unknown    Bee Pollen Unknown    Bioflavonoids Swelling    Citrus And Derivatives Unknown    Codeine Itching, Hives and Unknown     Tolerates percocet   Tolerates percocet    Tolerates percocet    Flowers Itching    Gadolinium-Containing Contrast Media Unknown    Shellfish Containing Products Swelling     SEAFOOD   [2]   Current Outpatient Medications   Medication Sig Dispense Refill    acetaminophen (Tylenol) 325 mg tablet Take 2 tablets (650 mg) by mouth every 8 hours if needed for headaches, mild pain (1 - 3) or fever (temp greater than 38.0 C). 30 tablet 0    albuterol (ProAir HFA) 90 mcg/actuation inhaler Inhale 2 puffs every 4 hours if needed for wheezing or shortness of breath. 18 g 5    albuterol 1.25 mg/3 mL  nebulizer solution Take 3 mL (1.25 mg) by nebulization every 6 hours if needed for wheezing or shortness of breath. 90 mL 11    albuterol 90 mcg/actuation inhaler Inhale 2 puffs as instructed every 6 hours as needed 18 g 1    apixaban (Eliquis) 5 mg tablet Take 1 tablet (5 mg) by mouth 2 times a day. 60 tablet 0    aspirin 81 mg EC tablet Take 1 tablet (81 mg) by mouth once daily.      atorvastatin (Lipitor) 80 mg tablet Take 1 tablet (80 mg) by mouth once daily at bedtime. 90 tablet 3    bisacodyl (Dulcolax) 10 mg suppository Insert 1 suppository (10 mg) into the rectum once daily as needed for constipation. 10 suppository 0    carvedilol (Coreg) 25 mg tablet Take 2 tablets (50mg) by mouth twice a day with meals.  Do not hold on dialysis days. 360 tablet 3    carvedilol (Coreg) 25 mg tablet Take 2 tablets by mouth two times a day with meals. 360 tablet 0    cloNIDine (Catapres) 0.1 mg tablet Take 2 tablets (0.2 mg) by mouth once daily at bedtime. -  hold hydralazine and clonidine the morning of Dialysis. 60 tablet 0    diclofenac sodium (Voltaren) 1 % gel Apply 4.5 inches (4 g) topically 4 times a day as needed (back pain). (Patient not taking: Reported on 3/7/2025) 200 g 0    dicyclomine (Bentyl) 10 mg capsule Take 1 capsule (10 mg) by mouth 2 times a day as needed (Abdominal cramps). 20 capsule 0    dilTIAZem CD (Cardizem CD) 360 mg 24 hr capsule Take 1 capsule (360 mg) by mouth once daily. 30 capsule 0    diphenhydrAMINE (BENADryl) 25 mg capsule Take 2 capsules (50 mg) by mouth every 6 hours if needed for itching. 20 capsule 0    diphenhydrAMINE (BENADryl) 50 mg capsule Take one capsule by mouth 1 hour prior to contrast injection. 1 capsule 0    epoetin joceline (Epogen,Procrit) 10,000 unit/mL injection Inject 1 mL (10,000 Units) under the skin 3 times a week.      fluticasone (Flonase) 50 mcg/actuation nasal spray Administer 2 sprays into each nostril once daily. Shake gently. Before first use, prime pump. After  use, clean tip and replace cap. 16 g 12    fluticasone furoate-vilanteroL (Breo Ellipta) 100-25 mcg/dose inhaler Inhale 1 puff once daily. 60 each 11    gabapentin (Neurontin) 300 mg capsule Take 1 capsule (300 mg) by mouth once daily at bedtime. 30 capsule 0    hydrALAZINE (Apresoline) 100 mg tablet Take 1 tablet (100 mg) by mouth 3 times a day. hold hydralazine and clonidine the morning of Dialysis. 90 tablet 0    hydrALAZINE (Apresoline) 100 mg tablet Take 1 tablet by mouth every 12 hours. 180 tablet 0    minoxidil (Loniten) 2.5 mg tablet Take 2 tablets by mouth two times a day. 60 tablet 1    NIFEdipine ER (NIFEdipine XL) 90 mg 24 hr tablet Take 1 tablet by mouth once daily. (Patient not taking: Reported on 3/7/2025) 90 tablet 0    ondansetron (Zofran) 4 mg tablet Take 1 tablet by mouth every 8 hours as needed for nausea/vomiting. 30 tablet 1    pantoprazole (ProtoNix) 40 mg EC tablet Take 1 tablet (40 mg) by mouth if needed (not regular take it). Do not crush, chew, or split. 30 tablet 0    polyethylene glycol (Glycolax, Miralax) 17 gram/dose powder Mix 17 g of powder and drink once daily as needed for constipation.      predniSONE (Deltasone) 20 mg tablet Take two and one half tablets by mouth at 13 hours, 7 hours, and 1 hour prior to contrast injection (Patient not taking: Reported on 3/7/2025) 8 tablet 0    sevelamer carbonate (Renvela) 800 mg tablet Take 1 tablet (800 mg) by mouth 3 times daily (morning, midday, late afternoon). Swallow tablet whole; do not crush, break, or chew. 90 tablet 0    SUMAtriptan (Imitrex) 25 mg tablet Take 1 tablet (25 mg) by mouth 1 time if needed for migraine. May repeat dose once in 2 hours if no relief.  Do not exceed 2 doses in 24 hours. 9 tablet 2    valsartan (Diovan) 320 mg tablet Take 1 tablet (320 mg) by mouth once daily in the evening. 30 tablet 0    valsartan (Diovan) 320 mg tablet Take 1 tablet by mouth once daily. 90 tablet 0    vitamin B complex-vitamin C-folic  acid (Renal Caps) 1 mg capsule Take 1 capsule by mouth every day. 90 capsule 3    vitamin E 90 mg (200 unit) capsule Take 1 capsule (200 Units) by mouth once daily. 90 capsule 3     No current facility-administered medications for this visit.   [3]   Past Surgical History:  Procedure Laterality Date    APPENDECTOMY  03/07/2017    Appendectomy    CT ABDOMEN ANGIOGRAM W AND/OR WO IV CONTRAST  04/23/2023    CT ABDOMEN ANGIOGRAM W AND/OR WO IV CONTRAST [facility] CT    OTHER SURGICAL HISTORY  03/07/2017    Cystoscopy With Pyeloscopy With Removal Of Calculus    OTHER SURGICAL HISTORY  03/07/2017    Anoscopy For Polyp Removal    OTHER SURGICAL HISTORY  12/08/2021    Arteriovenous fistula creation procedure    OTHER SURGICAL HISTORY  12/08/2021    Dialysis tunneled catheter placement    TUBAL LIGATION  03/07/2017    Tubal Ligation

## 2025-05-07 NOTE — PATIENT INSTRUCTIONS
SOB (shortness of breath): Mainly exertional in nature, could be due to HFpEF, volume overload from ESRD, or airway disease given the history of smoking. She is compliant with HD. Left thoracentesis was done 5/2023: 300 mL of serosanguineous, no analysis available. PFTs without obstruction - restricted with low DLCO. Previous LLL consolidation/ stable small pleural effusion.   - continue advair twice daily - rinse mouth out afterwards   - continue albuterol HFA inhaler 2 puffs or albuterol nebulizer treatment every 4-6 hours as needed for shortness of breath   - will get updated PFTs/ 6MWT/ O2 desat    - upcoming sleep study - will refer to sleep for evaluation post study     Thank you for visiting the Pulmonary clinic today!   Return to clinic 3 months or sooner if needed --> needs to be a physician - previously saw Dr. Michelet Laguna CNP  My office -  [phone]- Andres is my . Yvonne is my nurse [phone].   Radiology scheduling [phone]   Appointment scheduling [phone]   Pulmonary function testing - [phone]       Breathing tests/ appt with Honey can be in the same day [place] [address] Monday/ Thursday   Pulmonary function testing - [phone]    Appointment scheduling [phone]

## 2025-05-08 ENCOUNTER — APPOINTMENT (OUTPATIENT)
Dept: PULMONOLOGY | Facility: HOSPITAL | Age: 55
End: 2025-05-08
Payer: COMMERCIAL

## 2025-05-08 NOTE — PROGRESS NOTES
Stacey Heath  Age: 54 y.o.  MRN: [id number]  Date: 5/8/2025  Location of service: in community    Program Details  Medicaid Community Clinical Case Management  Status: Enrolled  Effective Dates: 10/17/2023 - present  Responsible Staff: NAREN Davidson      Goals Reviewed:  Problem: Access to Care Issue       Goal: Assess and Address Access Barriers       Priority: Medium        Problem: Anxiety       Goal: Maintain coping skills for continuous improvement       Priority: Medium              Summary:  This provider met with patient at the patient's home. This provider listened with empathy as patient talked about feeling anxious about her medical insurance and not knowing if it will cover dialysis. This provider called [facility] with the patient and spoke with a representative by the name of Ross. Ross was able to answer question all of the patient's questions. This provider had to call Ross back regarding transportation for the patient to dialysis after this provider called [employer] to schedule transportation to a medical appointment. This provider was informed by [employer] that patient had exhausted all of her allotted rides. This provider called Ross back with [facility] and was provided information on [employer]. This provider called Danika with [employer] to obtain an application for their services. This provider questions  patient about other future appointments that patient is in need of such as optical and hearing test. Provider will assist patient in making an appointment to get patient's vision checked.                      NAREN Davidson

## 2025-05-08 NOTE — PROGRESS NOTES
Stacey Heath  Age: 54 y.o.  MRN: [id number]  Date: 5/7/2025  Location of service: phone call    Program Details  Medicaid Community Clinical Case Management  Status: Enrolled  Effective Dates: 10/17/2023 - present  Responsible Staff: NAREN Davidsno      Goals Reviewed:  Problem: Access to Care Issue       Goal: Assess and Address Access Barriers       Priority: Medium        Summary:  This provider made contact with both [employer] and [facility] to coordinate transportation  so patient can receive the needed critical care services of dialysis. This provider attempted to speak with supervisors at both agencies. This provider was able to get a representative from [facility] to fill out a Pre-authorizations to add additional allotted rides to the patient's case. This provider's supervisor was able to get in contact with Vanna, a  at [facility] to assist with the situation. The patient has been approved with unlimited transportation for patient's medical care.                      NAREN Davidson

## 2025-05-09 ENCOUNTER — DOCUMENTATION (OUTPATIENT)
Dept: BEHAVIORAL HEALTH | Facility: CLINIC | Age: 55
End: 2025-05-09
Payer: COMMERCIAL

## 2025-05-09 DIAGNOSIS — F41.9 ANXIETY: ICD-10-CM

## 2025-05-12 ENCOUNTER — APPOINTMENT (OUTPATIENT)
Dept: SLEEP MEDICINE | Facility: HOSPITAL | Age: 55
End: 2025-05-12
Payer: COMMERCIAL

## 2025-05-12 NOTE — PROGRESS NOTES
Stacey Heath  Age: 54 y.o.  MRN: [id number]  Date: 5/09/2025  Location of service: phone call    Program Details  Medicaid Community Clinical Case Management  Status: Enrolled  Effective Dates: 10/17/2023 - present  Responsible Staff: NAREN Davidson      Goals Reviewed:  Problem: Access to Care Issue       Goal: Assess and Address Access Barriers       Priority: Medium        Problem: Anxiety       Goal: Maintain coping skills for continuous improvement       Priority: Medium            Summary:  This provider made contact with [employer] via telephone call. This provider set up transportation for patient to her medical appointment on Monday, May 12th, 2025. This provider then made telephone contact with the patient to confirm that transportation has been set up and educated the patient on the situation with patient's transportation for critical care unlimited rides through the patient's insurance. Patient verbal agreed that she understood the confusion and is relieved that everything is worked out.                      NAREN Davidson

## 2025-05-14 ENCOUNTER — DOCUMENTATION (OUTPATIENT)
Dept: BEHAVIORAL HEALTH | Facility: CLINIC | Age: 55
End: 2025-05-14
Payer: COMMERCIAL

## 2025-05-14 ENCOUNTER — APPOINTMENT (OUTPATIENT)
Dept: PRIMARY CARE | Facility: CLINIC | Age: 55
End: 2025-05-14
Payer: COMMERCIAL

## 2025-05-14 DIAGNOSIS — F41.9 ANXIETY: ICD-10-CM

## 2025-05-14 NOTE — PROGRESS NOTES
Stacey Heath  Age: 54 y.o.  MRN: [id number]  Date: 5/14/2025  Location of service: in community    Program Details  Medicaid Community Clinical Case Management  Status: Enrolled  Effective Dates: 10/17/2023 - present  Responsible Staff: NAREN Davidson      Goals Reviewed:    Problem: Anxiety       Goal: Maintain coping skills for continuous improvement       Priority: Medium        Problem: Financial Stressors       Goal: Assistance with financial concerns             Summary:  This provider met with patient at the patient's home. This provider listened with empathy as patient talked about wanting to be more involved with the community as she experiences boredom being at home. This provider also did a check in on patient's sx of anxiety due to being home many days by herself. 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 This provider asked follow up questions regarding patient's current medical appointments and validated patient's disappointment with having to miss her sleep study appointment via cancellation by provider.                      NAREN Davidson

## 2025-05-16 ENCOUNTER — DOCUMENTATION (OUTPATIENT)
Dept: BEHAVIORAL HEALTH | Facility: CLINIC | Age: 55
End: 2025-05-16
Payer: COMMERCIAL

## 2025-05-16 DIAGNOSIS — F41.9 ANXIETY: ICD-10-CM

## 2025-05-18 NOTE — PROGRESS NOTES
Stacey Heath  Age: 54 y.o.  MRN: [id number]  Date: 5/7/2025  Location of service: in community    Program Details  Medicaid Community Clinical Case Management  Status: Enrolled  Effective Dates: 10/17/2023 - present  Responsible Staff: NAREN Davidson      Goals Reviewed:  Problem: Anxiety       Goal: Maintain coping skills for continuous improvement       Priority: Medium        Problem: Negative Experience, Conflict with, or Distrust of Providers and/or Health System       Goal: Plan to Address Patient Specific Negative Experience, Distrust, or Conflict with Providers and/or Health System       Priority: High        Problem: Risk of Uncoordinated Care       Goal: Care will be Coordinated and Supported by a Multidisciplinary Team of Providers       Priority: High          Summary:  This writer met with patient for her pulmonology appointment to provide support and additional information, per patient's request.   See providers note for plan.   Patient states she believes the appointment went well, and she enjoyed meeting with the provider.   Patient verbalizes readiness to move forward with the tests ordered.   This writer stays with patient after the appointment to provide support as she calls for her return ride.    Appointment start time: 1515  Appointment completion time: 1630  Total time spent with patient (in minutes): 75  Non-Billable Time: 75  Billable Time Total: 0    Roberta Gallego RN

## 2025-05-18 NOTE — PROGRESS NOTES
Stacey Heath  Age: 54 y.o.  MRN: [id number]  Date: 5/6/2025  Location of service: in community    Program Details  Medicaid Community Clinical Case Management  Status: Enrolled  Effective Dates: 10/17/2023 - present  Responsible Staff: NAREN Davidson      Goals Reviewed:      Summary:  This writer visits with patient at dialysis.   Patient states she is confused about her insurance. This writer will follow up with Ludmila SNEED.   This writer confirms patient's pulmonology appointment with her for tomorrow. Patient confirms at this time.   This writer encourages patient to call if her transportation is not there by 3:00 p.m. tomorrow, due to issues with transportation in the past. Patient states she will call.    Appointment start time: 1251  Appointment completion time: 1259  Total time spent with patient (in minutes): 8  Non-Billable Time: 8  Billable Time Total: 0    Roberta Gallego RN

## 2025-05-21 ENCOUNTER — APPOINTMENT (OUTPATIENT)
Dept: RESPIRATORY THERAPY | Facility: HOSPITAL | Age: 55
End: 2025-05-21
Payer: COMMERCIAL

## 2025-05-21 ENCOUNTER — DOCUMENTATION (OUTPATIENT)
Dept: BEHAVIORAL HEALTH | Facility: CLINIC | Age: 55
End: 2025-05-21
Payer: COMMERCIAL

## 2025-05-21 DIAGNOSIS — F41.9 ANXIETY: ICD-10-CM

## 2025-05-21 NOTE — PROGRESS NOTES
"Stacey Heath  Age: 54 y.o.  MRN: [id number]  Date: 5/21/2025  Location of service: in community    Program Details  Medicaid Community Clinical Case Management  Status: Enrolled  Effective Dates: 10/17/2023 - present  Responsible Staff: NAREN Davidson      Goals Reviewed:      Problem: Anxiety       Goal: Maintain coping skills for continuous improvement       Priority: Medium        Problem: Financial Stressors       Goal: Assistance with financial concerns           Summary:  This provider met with patient at the patient's home. This provider listened with empathy as patient talked about her partner being stressed about the household finances which causes the patient some anxiety. This provider assisted patient with calling [facility] regarding their \"Options\" programs for persons with disabilities, aging and low income. This provider spoke with Priti whom informed provider about their programs that include food, cleaning, and medical transportation. Priti informed this provider to call back in July as there is a waiting list. This provider noticed that patient seemed a little fatigue as evidence by the patient dozing off during the appointment with this provider. This provider engaged patient in a discussion regarding provider's observation. Patient explained that after being in the hospital, patient has a tendency to be very tired from not sleeping well while admitted. Provider did a check in to make sure that patient was taken her medications for the morning time.                      NAREN Davidson   "

## 2025-05-26 NOTE — PROGRESS NOTES
Stacey Heath  Age: 54 y.o.  MRN: [id number]  Date: 5/16/2025  Location of service: in community    Program Details  Medicaid Community Clinical Case Management  Status: Enrolled  Effective Dates: 10/17/2023 - present  Responsible Staff: NAREN Davidson      Goals Reviewed:  Problem: Anxiety       Goal: Maintain coping skills for continuous improvement       Priority: Medium        Problem: Kidney Issues       Goal: Improve health to get on kidney transplant list       Priority: High        Problem: Negative Experience, Conflict with, or Distrust of Providers and/or Health System       Goal: Plan to Address Patient Specific Negative Experience, Distrust, or Conflict with Providers and/or Health System       Priority: High        Problem: Risk of Uncoordinated Care       Goal: Care will be Coordinated and Supported by a Multidisciplinary Team of Providers       Priority: High          Summary:  This writer meets with patient in her home for community visit.    Patient requests that this writer assist her in scheduling her pulmonary function test. This writer calls the scheduling line and leaves a voicemail.    Patient states she has been taking her blood pressure every day, however has not been writing them down.    This writer encourages patient to document her blood pressures, patient states she will do this in the mornings when she takes her medications.   This writer engages in relationship building.    Appointment start time: 0927  Appointment completion time: 1048  Total time spent with patient (in minutes): 81  Non-Billable Time: 60  Billable Time Total: 21    Roberta Gallego RN

## 2025-05-28 ENCOUNTER — DOCUMENTATION (OUTPATIENT)
Dept: BEHAVIORAL HEALTH | Facility: CLINIC | Age: 55
End: 2025-05-28
Payer: COMMERCIAL

## 2025-05-28 DIAGNOSIS — F41.9 ANXIETY: ICD-10-CM

## 2025-05-28 NOTE — PROGRESS NOTES
Stacey Heath  Age: 54 y.o.  MRN: [id number]  Date: 5/28/2025  Location of service: in community    Program Details  Medicaid Community Clinical Case Management  Status: Enrolled  Effective Dates: 10/17/2023 - present  Responsible Staff: NAREN Davidson      Goals Reviewed:  Problem: Access to Care Issue       Goal: Assess and Address Access Barriers       Priority: Medium        Problem: Anxiety       Goal: Maintain coping skills for continuous improvement       Priority: Medium              Summary:  This provider met with patient at the patient's home. This provider listened as patient talked about some stress regarding a family member that came to the house for help. This provider validated patient's concerns and asked follow up questions to see how patient is coping with this stress. This provider assisted patient with calling to schedule an optometry appointment. This provider also assisted patient with calling [facility] to obtain a Life Alert device.                      NAREN Davidson

## 2025-05-30 ENCOUNTER — DOCUMENTATION (OUTPATIENT)
Dept: BEHAVIORAL HEALTH | Facility: CLINIC | Age: 55
End: 2025-05-30
Payer: COMMERCIAL

## 2025-06-02 ENCOUNTER — DOCUMENTATION (OUTPATIENT)
Dept: BEHAVIORAL HEALTH | Facility: CLINIC | Age: 55
End: 2025-06-02
Payer: COMMERCIAL

## 2025-06-02 DIAGNOSIS — F41.9 ANXIETY: ICD-10-CM

## 2025-06-04 ENCOUNTER — DOCUMENTATION (OUTPATIENT)
Dept: BEHAVIORAL HEALTH | Facility: CLINIC | Age: 55
End: 2025-06-04
Payer: COMMERCIAL

## 2025-06-04 DIAGNOSIS — F41.9 ANXIETY: ICD-10-CM

## 2025-06-04 NOTE — PROGRESS NOTES
Stacey Heath  Age: 54 y.o.  MRN: [id number]  Date: 6/4/2025  Location of service: in community    Program Details  Medicaid Community Clinical Case Management  Status: Enrolled  Effective Dates: 10/17/2023 - present  Responsible Staff: NAREN Davidson      Goals Reviewed:  Problem: Access to Care Issue       Goal: Assess and Address Access Barriers       Priority: Medium              Summary:  This provider met with patient at patient's daughter's house where patient is staying for the week. This provider listened with empathy as patient talked about stress within the family and the impact it is having on the patient. This provider asked follow up questions to identify coping skills that are being utilized by patient. This provider also ensured that patient is still sticking with the regular schedule for dialysis and has all needed medications with her. This provider worked with patient to secure non-emergency transportation to preventative care medical appointments. This provider contacted Brody Moreira with Dr. Barraza's office for assistance as the [facility] transportation needs a physician's signature on the application.                      NAREN Davidson

## 2025-06-09 ENCOUNTER — DOCUMENTATION (OUTPATIENT)
Dept: BEHAVIORAL HEALTH | Facility: CLINIC | Age: 55
End: 2025-06-09
Payer: COMMERCIAL

## 2025-06-09 NOTE — PROGRESS NOTES
Stacey Heath  Age: 55 y.o.  MRN: [id number]  Date: 6/2/2025  Location of service: in community    Program Details  Medicaid Community Clinical Case Management  Status: Enrolled  Effective Dates: 10/17/2023 - present  Responsible Staff: NAREN Davidson      Goals Reviewed:  Problem: Anxiety       Goal: Maintain coping skills for continuous improvement       Priority: Medium        Problem: Kidney Issues       Goal: Improve health to get on kidney transplant list       Priority: High        Problem: Risk of Uncoordinated Care       Goal: Care will be Coordinated and Supported by a Multidisciplinary Team of Providers       Priority: High          Summary:  This writer meets with patient in her home for community visit.   Patient discusses her upcoming birthday and states she will be spending it with family.     BILLABLE  Patient states she has been taking her blood pressure when she takes her morning meds, however states she has been forgetting to write it down. Patient states she's going to make an effort to start writing it down.    Appointment start time: 1458  Appointment completion time: 1540  Total time spent with patient (in minutes): 42  Non-Billable Time: 30  Billable Time Total: 12    Roberta Gallego RN

## 2025-06-09 NOTE — PROGRESS NOTES
Stacey Heath  Age: 55 y.o.  MRN: [id number]  Date: 5/30/2025  Location of service: phone call    Program Details  Medicaid Community Clinical Case Management  Status: Enrolled  Effective Dates: 10/17/2023 - present  Responsible Staff: NAREN Davidson      Goals Reviewed:      Summary:  This writer calls patient to ask about rescheduling our appointment for today to Monday instead. Patient is agreeable to rescheduling.    Appointment start time: 0806  Appointment completion time: 0810  Total time spent with patient (in minutes): 4  Non-Billable Time: 4  Billable Time Total: 0    Roberta Gallego RN

## 2025-06-10 NOTE — PROGRESS NOTES
Stacey Heath  Age: 55 y.o.  MRN: [id number]  Date: 6/09/2025  Location of service: phone call    Program Details  Medicaid Community Clinical Case Management  Status: Enrolled  Effective Dates: 10/17/2023 - present  Responsible Staff: NAREN Davidson      Goals Reviewed:  Problem: Access to Care Issue       Goal: Assess and Address Access Barriers       Priority: Medium        Problem: Anxiety       Goal: Maintain coping skills for continuous improvement       Priority: Medium        Problem: Financial Stressors       Goal: Assistance with financial concerns         Problem: Kidney Issues       Goal: Improve health to get on kidney transplant list       Priority: High        Problem: Medication Adherence       Goal: Adherence to Medication Regimen       Priority: High        Problem: Negative Experience, Conflict with, or Distrust of Providers and/or Health System       Goal: Plan to Address Patient Specific Negative Experience, Distrust, or Conflict with Providers and/or Health System       Priority: High        Problem: Risk of Uncoordinated Care       Goal: Care will be Coordinated and Supported by a Multidisciplinary Team of Providers       Priority: High          Summary:  This provider made successful contact with patient via telephone call. This provider assisted patient with filling out the [employer] transportation application over the phone and then submitted it to [facility] in [facility].                      NAREN Davidson   
No

## 2025-06-11 ENCOUNTER — DOCUMENTATION (OUTPATIENT)
Dept: BEHAVIORAL HEALTH | Facility: CLINIC | Age: 55
End: 2025-06-11
Payer: COMMERCIAL

## 2025-06-11 DIAGNOSIS — F41.9 ANXIETY: ICD-10-CM

## 2025-06-12 NOTE — PROGRESS NOTES
"Stacey Heath  Age: 55 y.o.  MRN: [id number]  Date: 6/11/2025  Location of service: in community    Program Details  Medicaid Community Clinical Case Management  Status: Enrolled  Effective Dates: 10/17/2023 - present  Responsible Staff: NAREN Davidson      Goals Reviewed:    Problem: Anxiety       Goal: Maintain coping skills for continuous improvement       Priority: Medium        Problem: Financial Stressors       Goal: Assistance with financial concerns         Problem: Kidney Issues       Goal: Improve health to get on kidney transplant list       Priority: High              Summary:  This provider met with patient at the patient's home. This provider assisted patient with reviewing the directions for the \"At Home Sleep Study\" kit that came in the mail. This provider gave patient information on various [employer] and the information for the Food for Life program through [facility]. This provider also discussed potential remote employment opportunities as patient is feeling anxiety around the household finances. This provider then traveled to the [facility] to speak with Julio SADLER regarding transferring patient's dialysis location to [place] as it is closer to the patient's home and transportation continues to be a challenge.                      NAREN Davidson   "

## 2025-06-17 ENCOUNTER — DOCUMENTATION (OUTPATIENT)
Dept: BEHAVIORAL HEALTH | Facility: CLINIC | Age: 55
End: 2025-06-17
Payer: COMMERCIAL

## 2025-06-18 ENCOUNTER — DOCUMENTATION (OUTPATIENT)
Dept: BEHAVIORAL HEALTH | Facility: CLINIC | Age: 55
End: 2025-06-18
Payer: COMMERCIAL

## 2025-06-18 DIAGNOSIS — F41.9 ANXIETY: ICD-10-CM

## 2025-06-18 RX ORDER — CARVEDILOL 25 MG/1
50 TABLET ORAL
COMMUNITY
Start: 2025-06-18 | End: 2025-07-23

## 2025-06-18 RX ORDER — CETIRIZINE HYDROCHLORIDE 10 MG/1
10 TABLET ORAL DAILY
COMMUNITY

## 2025-06-19 NOTE — PROGRESS NOTES
Stacey Heath  Age: 55 y.o.  MRN: [id number]  Date: 6/17/2025  Location of service: in community    Program Details  Medicaid Community Clinical Case Management  Status: Enrolled  Effective Dates: 10/17/2023 - present  Responsible Staff: NAREN Davidson      Goals Reviewed:  Problem: Risk of Uncoordinated Care       Goal: Care will be Coordinated and Supported by a Multidisciplinary Team of Providers       Priority: High          Summary:  This writer met with patient at dialysis briefly, to check in and confirm our appointment for tomorrow morning. Patient confirms our appointment at this time. This writer engages in relationship building.    Appointment start time: 1136  Appointment completion time: 1145  Total time spent with patient (in minutes): 9  Non-Billable Time: 9  Billable Time Total: 0    Roberta Gallego RN

## 2025-06-23 NOTE — PROGRESS NOTES
Stacey Heath  Age: 55 y.o.  MRN: [id number]  Date: 6/18/2025  Location of service: in community    Program Details  Medicaid Community Clinical Case Management  Status: Enrolled  Effective Dates: 10/17/2023 - present  Responsible Staff: NAREN Davidson      Goals Reviewed:  Problem: Anxiety       Goal: Maintain coping skills for continuous improvement       Priority: Medium        Problem: Kidney Issues       Goal: Improve health to get on kidney transplant list       Priority: High        Problem: Medication Adherence       Goal: Adherence to Medication Regimen       Priority: High        Problem: Negative Experience, Conflict with, or Distrust of Providers and/or Health System       Goal: Plan to Address Patient Specific Negative Experience, Distrust, or Conflict with Providers and/or Health System       Priority: High        Problem: Risk of Uncoordinated Care       Goal: Care will be Coordinated and Supported by a Multidisciplinary Team of Providers       Priority: High          Summary:  This writer meets with patient in her home for a community visit.  Patient still has the sleep study kit at her home and is advised by the sleep study company to go ahead and use it tonight and send it back in the morning.     BILLABLE   This writer and patient discuss the importance of completing her PFT and sleep study. Patient agrees. This writer provides support and guidance as patient calls to schedule these.     Medications:  Patient shows this writer her medications and states that these are all now prescribed by the [facility] and states they are the only ones she is taking at this time. Patient states her medications will be switched to [facility] by her [facility] PCP.     Review:  Cetirizine 10mg - once daily  Sumatriptan 100mg PRN for headaches - may take a repeat dose after 2 hours - max 2 a day  Carvedilol 25mg - take 2 tablets BID with meals  Eliquis 5mg - take 1 tablet BID  Atorvastatin calcium 80mg  - once daily  Hydralazine HCL 100mg - Q12hrs  Nifedipine ER Osmotic Release 90 TB24 - once daily  Minoxidil 2.5mg - BID  Albuterol  inhaler  Valsartan 320mg - Once daily  Gabapentin 300mg - Once daily     Appointment start time: 1130  Appointment completion time: 1242  Total time spent with patient (in minutes): 72  Non-Billable Time: 20  Billable Time Total: 52    Roberta Gallego RN

## 2025-06-25 ENCOUNTER — DOCUMENTATION (OUTPATIENT)
Dept: BEHAVIORAL HEALTH | Facility: CLINIC | Age: 55
End: 2025-06-25
Payer: COMMERCIAL

## 2025-06-25 DIAGNOSIS — F41.9 ANXIETY: ICD-10-CM

## 2025-06-26 NOTE — PROGRESS NOTES
Stacey Heath  Age: 55 y.o.  MRN: [id number]  Date: 6/25/2025  Location of service: in community    Program Details  Medicaid Community Clinical Case Management  Status: Enrolled  Effective Dates: 10/17/2023 - present  Responsible Staff: NAREN Davidson      Goals Reviewed:  Problem: Access to Care Issue       Goal: Assess and Address Access Barriers       Priority: Medium          Summary:  This provider met with patient at the patient's home. This provider listened with empathy as patient talked about her breathing and possible allergies. This provider asked follow up questions to see if patient has been using an oxygen tank that she received from a family member. This provider also inquired to make sure patient was able to reschedule the sleep study test. This provider also left a detailed voicemail for Arcelia Murphy at the [facility] to see where patient was on the waiting list to transfer dialysis centers. This provider is also working to get a referral to the Food for Life program through  on behalf of the patient in order to have access to fresh fruits and vegetables.                      NAREN Davidson

## 2025-06-30 ENCOUNTER — DOCUMENTATION (OUTPATIENT)
Dept: BEHAVIORAL HEALTH | Facility: CLINIC | Age: 55
End: 2025-06-30
Payer: COMMERCIAL

## 2025-07-01 NOTE — PROGRESS NOTES
Stacey Heath  Age: 55 y.o.  MRN: [id number]  Date: 6/30/2025  Location of service: phone call    Program Details  Medicaid Community Clinical Case Management  Status: Enrolled  Effective Dates: 10/17/2023 - present  Responsible Staff: NAREN Davidson      Goals Reviewed:      Summary:  This writer calls patient to inform her that this writer will need to cancel our appointment for tomorrow d/t a training. Patient states she is fine with meeting at our next scheduled appt on 7/16/25. Patient also informs this writer that she is going to be starting dialysis at [facility] in [place] on Monday and he hours will be in the evening.    Appointment start time: 1400  Appointment completion time: 1404  Total time spent with patient (in minutes): 4  Non-Billable Time: 4  Billable Time Total: 0    Roberta Gallego RN

## 2025-07-09 ENCOUNTER — DOCUMENTATION (OUTPATIENT)
Dept: BEHAVIORAL HEALTH | Facility: CLINIC | Age: 55
End: 2025-07-09
Payer: COMMERCIAL

## 2025-07-09 ENCOUNTER — APPOINTMENT (OUTPATIENT)
Dept: BEHAVIORAL HEALTH | Facility: CLINIC | Age: 55
End: 2025-07-09
Payer: COMMERCIAL

## 2025-07-14 ENCOUNTER — DOCUMENTATION (OUTPATIENT)
Dept: BEHAVIORAL HEALTH | Facility: CLINIC | Age: 55
End: 2025-07-14
Payer: COMMERCIAL

## 2025-07-14 DIAGNOSIS — Z59.41 FOOD INSECURITY: ICD-10-CM

## 2025-07-14 DIAGNOSIS — F41.9 ANXIETY: ICD-10-CM

## 2025-07-14 SDOH — ECONOMIC STABILITY: FOOD INSECURITY: WITHIN THE PAST 12 MONTHS, YOU WORRIED THAT YOUR FOOD WOULD RUN OUT BEFORE YOU GOT MONEY TO BUY MORE.: OFTEN TRUE

## 2025-07-14 SDOH — ECONOMIC STABILITY: FOOD INSECURITY: WITHIN THE PAST 12 MONTHS, THE FOOD YOU BOUGHT JUST DIDN'T LAST AND YOU DIDN'T HAVE MONEY TO GET MORE.: OFTEN TRUE

## 2025-07-14 SDOH — ECONOMIC STABILITY - FOOD INSECURITY: FOOD INSECURITY: Z59.41

## 2025-07-15 NOTE — PROGRESS NOTES
Stacey Heath  Age: 55 y.o.  MRN: [id number]  Date: 7/9/2025  Location of service: phone call    Program Details  Medicaid Community Clinical Case Management  Status: Enrolled  Effective Dates: 10/17/2023 - present  Responsible Staff: NAREN Davidson      Goals Reviewed:      Summary:  This writer calls patient at 1345 to ask about having a virtual appointment today. Patient is agreeable to a virtual appointment at 1600 today.  This writer calls patient after she does not arrive for our virtual appointment at 1600 and patient states she is still at the hospital and has not been taken home yet, she requests to reschedule.    Appointment start time: 1630  Appointment completion time: 1636  Total time spent with patient (in minutes): 6  Non-Billable Time: 6  Billable Time Total: 0    Roberta Gallego RN

## 2025-07-15 NOTE — PROGRESS NOTES
Stacey Heath  Age: 55 y.o.  MRN: [id number]  Date: 7/14/2025  Location of service: phone call    Program Details  Medicaid Community Clinical Case Management  Status: Enrolled  Effective Dates: 10/17/2023 - present  Responsible Staff: NAREN Davidson      Goals Reviewed:  Problem: Access to Care Issue       Goal: Assess and Address Access Barriers       Priority: Medium              Summary:  This provider made successful telephone contact with the patient. This provider informed patient that a referral was put in so that the patient can benefit from the Food For Life program through the nutrition program. This provider let patient know that someone will be calling her to schedule a time to  the food items. This provider suggested that patient pick a Wednesday morning during the month so that patient could pick the stuff up at [facility] on provider's way to meet with patient for our scheduled appointments. This provider also assisted patient with calling [employer] to cancel patient's transportation to and from dialysis as patient's dialysis center was switched to the [facility] that is within walking distance of patient's home.                      NAREN Davidson

## 2025-07-23 ENCOUNTER — DOCUMENTATION (OUTPATIENT)
Dept: BEHAVIORAL HEALTH | Facility: CLINIC | Age: 55
End: 2025-07-23

## 2025-07-23 ENCOUNTER — APPOINTMENT (OUTPATIENT)
Dept: OPHTHALMOLOGY | Facility: CLINIC | Age: 55
End: 2025-07-23
Payer: COMMERCIAL

## 2025-07-23 DIAGNOSIS — F41.9 ANXIETY: ICD-10-CM

## 2025-07-24 NOTE — PROGRESS NOTES
Stacey Heath  Age: 55 y.o.  MRN: [id number]  Date: 7/23/2025  Location of service: in community    Program Details  Medicaid Community Clinical Case Management  Status: Enrolled  Effective Dates: 10/17/2023 - present  Responsible Staff: NAREN Davidson      Goals Reviewed:  Problem: Access to Care Issue       Goal: Assess and Address Access Barriers       Priority: Medium        Problem: Anxiety       Goal: Maintain coping skills for continuous improvement       Priority: Medium          Summary:  This provider met with patient at the patient's home. This provider listed as patient explained that her most recent ED visit was a result of a fall during the weekend. This provider reinforced that patient took the right steps in getting checked out as patient is a high fall risk. This provider assisted patient with calling [employer] to schedule access for public transportation to medical appointments since patient ran out of the allotted rides through the medical insurance company. This provider also followed up on a referral that was up in on behalf of the patient for the Food for Life program. This provider talked with patient to ensure that patient's dialysis is running smoothly since the transition to a different dialysis center.                    NAREN Davidson

## 2025-07-30 ENCOUNTER — DOCUMENTATION (OUTPATIENT)
Dept: BEHAVIORAL HEALTH | Facility: CLINIC | Age: 55
End: 2025-07-30
Payer: COMMERCIAL

## 2025-07-30 DIAGNOSIS — F41.9 ANXIETY: ICD-10-CM

## 2025-07-30 NOTE — PROGRESS NOTES
"Stacey Heath  Age: 55 y.o.  MRN: [id number]  Date: 7/30/2025  Location of service: in community    Program Details  Medicaid Community Clinical Case Management  Status: Enrolled  Effective Dates: 10/17/2023 - present  Responsible Staff: NAREN Davidson      Goals Reviewed:  Problem: Anxiety       Goal: Maintain coping skills for continuous improvement       Priority: Medium        Problem: Kidney Issues       Goal: Improve health to get on kidney transplant list       Priority: High        Problem: Medication Adherence       Goal: Adherence to Medication Regimen       Priority: High        Problem: Negative Experience, Conflict with, or Distrust of Providers and/or Health System       Goal: Plan to Address Patient Specific Negative Experience, Distrust, or Conflict with Providers and/or Health System       Priority: High        Problem: Risk of Uncoordinated Care       Goal: Care will be Coordinated and Supported by a Multidisciplinary Team of Providers       Priority: High          Summary:  This writer and Ludmila SNEED meet with patient in her home for a community visit.  We engage in relationship building throughout the appointment.   We discuss at length patient's thoughts and feelings towards her health and health maintenance. Patient states she knows how she should manage her diet and states that when she is cooking for herself she is mindful of what she is cooking. However, patient states she isn't going to look at everything she is eating in detail because she states she doesn't want to live like that. Patient states she wants to do what she can to live as long as she can, and states she will continue to go to her appts and dialysis, and continue to advocate for herself, but states she also wants to \"be herself\" and enjoy her life.   Patient states she is aware of her health and states, \"if I don't get a kidney by 2026, then I don't see myself getting to 2027. I'm not trying to be " "negative, just realistic.\" Patient states she has accepted her eventual passing and states she won't \"sit in a corner and cry about it,\" and wants to enjoy her life to the best of her ability at this time, while still taking the steps towards bettering her health where she sees fit.     Appointment start time: 1129  Appointment completion time: 1313  Total time spent with patient (in minutes): 104  Non-Billable Time: 104  Billable Time Total: 0    Roberta Gallego RN    "

## 2025-07-31 NOTE — PROGRESS NOTES
"Stacey Heath  Age: 55 y.o.  MRN: [id number]  Date: 7/30/2025  Location of service: in community    Program Details  Medicaid Community Clinical Case Management  Status: Enrolled  Effective Dates: 10/17/2023 - present  Responsible Staff: NAREN Davidson      Goals Reviewed:  Problem: Access to Care Issue       Goal: Assess and Address Access Barriers       Priority: Medium        Problem: Anxiety       Goal: Maintain coping skills for continuous improvement       Priority: Medium          Problem: Kidney Issues       Goal: Improve health to get on kidney transplant list       Priority: High        Problem: Medication Adherence       Goal: Adherence to Medication Regimen       Priority: High          Summary:  This provider and Roberta Castaneda Case Manager met with patient in the patient's home. This provider listened as patient talked about her positive experiences at the [facility] where she goes for dialysis. This provider asked follow up questions to find out what is working at the new dialysis center compared to the old one. Patient states that this dialysis center has there own transportation for their patients so that transportation is not a barrier. This provider engaged patient in conversation about diet restrictions and patient's life style habits. Patient states \"My grandma always said, if you end up with a chronic medical condition, don't dwell on it. Dwelling will only make things worse\". Patient explained that this is why she tries to follow the diet and medical plan. However, she still enjoy a certain lifestyle to feel like she is living life to the fullest. Patient also acknowledges that kidney disease is a permanent thing. This provider educated patient on how the progression of kidney disease can stay stable or slow down. This provider also reviewed and explained transportation paperwork with patient that was received through the [employer] transportation program. This provider " did a check in to see where patient's level of anxiety is sitting and how life challenges have either slowed down or worked themselves out. This provider reminded patient that provider will stop by the [employer] pantry at [facility] before the next visit next week to get fresh fruits and vegetables for the patient.                      NAREN Davidson

## 2025-08-05 ENCOUNTER — APPOINTMENT (OUTPATIENT)
Facility: HOSPITAL | Age: 55
End: 2025-08-05
Payer: COMMERCIAL

## 2025-08-06 ENCOUNTER — CLINICAL SUPPORT (OUTPATIENT)
Facility: HOSPITAL | Age: 55
End: 2025-08-06
Payer: COMMERCIAL

## 2025-08-06 ENCOUNTER — DOCUMENTATION (OUTPATIENT)
Dept: BEHAVIORAL HEALTH | Facility: CLINIC | Age: 55
End: 2025-08-06

## 2025-08-06 DIAGNOSIS — F41.9 ANXIETY: ICD-10-CM

## 2025-08-06 PROCEDURE — H2019 THER BEHAV SVC, PER 15 MIN: HCPCS | Mod: HE | Performed by: COMMUNITY/BEHAVIORAL HEALTH

## 2025-08-06 NOTE — PROGRESS NOTES
[employer]  Diet Recommendation 1: MyPlate  Diet Recommendation 2: Healthy Eating  Diet Recommendation 3: Renal, ESRD  Other Diet Recommendations: Low potassium  Food Intolerance Avoidance: NKFA  Nutrition Goals Stated: Avoids foods high in potassium. Pt on dialysis.  Household Size: 3 Family Members  Interventions: Referral Number: 1st 6 Mo Referral 6 Mos  Interventions: Visit Number: 1 of 6 Visits - Max 6 Visits/Referral Each 6 Mo Period  Other Interventions: Community Health Worker comes to  food for patient.  Grains: 25-50% Whole  Fruit: 50-75% Fresh  Vegetables: % Fresh  Proteins: 1-2 Plant-based Items  Dairy: 25-50% Lowfat  Originating Site of Referral Order: Dr. Jenelle Lee  Initials of RD Assisting Today: DEVONTE

## 2025-08-06 NOTE — PROGRESS NOTES
Stacey Heath  Age: 55 y.o.  MRN: [id number]  Date: 8/6/2025  Location of service: in community    Program Details  Medicaid Community Clinical Case Management  Status: Enrolled  Effective Dates: 10/17/2023 - present  Responsible Staff: NAREN Davidson      Goals Reviewed:  Problem: Access to Care Issue       Goal: Assess and Address Access Barriers       Priority: Medium            Problem: Risk of Uncoordinated Care       Goal: Care will be Coordinated and Supported by a Multidisciplinary Team of Providers       Priority: High          Summary:  This provider met with patient at the patient's home. This provider delivered six bags of groceries that this provider picked up from the [facility] [employer] program. This provider assisted patient with scheduling transportation to patient's next couple of medical appointments utilizing the [employer] transportation program through the [facility]. This provider spoke with patient regarding stepping the patient down to every other week. Patient agreed to the plan.                      NAREN Davidson

## 2025-08-07 ENCOUNTER — DOCUMENTATION (OUTPATIENT)
Dept: BEHAVIORAL HEALTH | Facility: CLINIC | Age: 55
End: 2025-08-07
Payer: COMMERCIAL

## 2025-08-07 ENCOUNTER — APPOINTMENT (OUTPATIENT)
Dept: OPHTHALMOLOGY | Facility: CLINIC | Age: 55
End: 2025-08-07
Payer: COMMERCIAL

## 2025-08-07 DIAGNOSIS — H52.13 MYOPIA OF BOTH EYES WITH ASTIGMATISM AND PRESBYOPIA: Primary | ICD-10-CM

## 2025-08-07 DIAGNOSIS — H34.231 RETINAL ARTERY BRANCH OCCLUSION OF RIGHT EYE: ICD-10-CM

## 2025-08-07 DIAGNOSIS — H35.033 HYPERTENSIVE RETINOPATHY OF BOTH EYES: ICD-10-CM

## 2025-08-07 DIAGNOSIS — H52.203 MYOPIA OF BOTH EYES WITH ASTIGMATISM AND PRESBYOPIA: Primary | ICD-10-CM

## 2025-08-07 DIAGNOSIS — F41.9 ANXIETY: ICD-10-CM

## 2025-08-07 DIAGNOSIS — H52.4 MYOPIA OF BOTH EYES WITH ASTIGMATISM AND PRESBYOPIA: Primary | ICD-10-CM

## 2025-08-07 DIAGNOSIS — Z96.1 PSEUDOPHAKIA OF BOTH EYES: ICD-10-CM

## 2025-08-07 PROCEDURE — 2023F DILAT RTA XM W/O RTNOPTHY: CPT | Performed by: OPTOMETRIST

## 2025-08-07 PROCEDURE — 92134 CPTRZ OPH DX IMG PST SGM RTA: CPT | Performed by: OPTOMETRIST

## 2025-08-07 PROCEDURE — 99214 OFFICE O/P EST MOD 30 MIN: CPT | Performed by: OPTOMETRIST

## 2025-08-07 ASSESSMENT — REFRACTION_MANIFEST
OD_CYLINDER: -0.50
OS_CYLINDER: -1.00
OD_SPHERE: -1.50
OD_CYLINDER: -0.50
OS_AXIS: 065
OD_AXIS: 015
OD_SPHERE: -1.50
OS_SPHERE: -0.50
OD_AXIS: 015
METHOD_AUTOREFRACTION: 1
OS_ADD: +2.00
OS_AXIS: 065
OS_CYLINDER: -1.00
OS_SPHERE: -1.00

## 2025-08-07 ASSESSMENT — CONF VISUAL FIELD
OD_INFERIOR_NASAL_RESTRICTION: 0
OS_INFERIOR_NASAL_RESTRICTION: 0
OS_NORMAL: 1
OD_SUPERIOR_NASAL_RESTRICTION: 0
OD_NORMAL: 1
OD_SUPERIOR_TEMPORAL_RESTRICTION: 0
OS_INFERIOR_TEMPORAL_RESTRICTION: 0
OS_SUPERIOR_TEMPORAL_RESTRICTION: 0
OD_INFERIOR_TEMPORAL_RESTRICTION: 0
OS_SUPERIOR_NASAL_RESTRICTION: 0

## 2025-08-07 ASSESSMENT — VISUAL ACUITY
OD_SC: CF @ 1FT
OS_SC: 20/60
OS_SC+: +2
METHOD: SNELLEN - LINEAR

## 2025-08-07 ASSESSMENT — EXTERNAL EXAM - LEFT EYE: OS_EXAM: NORMAL

## 2025-08-07 ASSESSMENT — TONOMETRY
OD_IOP_MMHG: 14
OS_IOP_MMHG: 14
IOP_METHOD: GOLDMANN APPLANATION

## 2025-08-07 ASSESSMENT — EXTERNAL EXAM - RIGHT EYE: OD_EXAM: NORMAL

## 2025-08-07 ASSESSMENT — SLIT LAMP EXAM - LIDS
COMMENTS: GOOD POSITION
COMMENTS: GOOD POSITION

## 2025-08-07 ASSESSMENT — CUP TO DISC RATIO
OS_RATIO: 0.4
OD_RATIO: 0.4

## 2025-08-07 NOTE — ASSESSMENT & PLAN NOTE
Pt reports vision changes OD x several months. BCVA OD CF @ 1Ft. DFE shows BRAO OD along inferior arcade, inferior macula.   Scattered blot hemes OD/OS.  Pt educated on findings. Pt under care with cardiology - does not have an appointment in place - suggested to schedule ASAP. Will also schedule for next appt with retina for evaluation and care. Discussed possible permanent changes to vision even after resolution of acute retinal thickening. Pt voiced understanding.

## 2025-08-07 NOTE — ASSESSMENT & PLAN NOTE
Mild Rx. BCVA limited OD due to BRAO and retinal thickening. Areas of parafoveal CME OS.  Pt educated on findings. At this time, recommend holding off on finalizing Rx. Will refer to retina for next available. Pt voiced understanding.

## 2025-08-07 NOTE — PROGRESS NOTES
Stacey Heath  Age: 55 y.o.  MRN: [id number]  Date: 8/7/2025  Location of service: phone call    Program Details  Medicaid Community Clinical Case Management  Status: Enrolled  Effective Dates: 10/17/2023 - present  Responsible Staff: NAREN Davidson      Goals Reviewed:  Problem: Access to Care Issue       Goal: Assess and Address Access Barriers       Priority: Medium          Summary:  This provider was contacted by patient via telephone call. Patient informed this provider of patient's next few medical appointments where patient is in need of provider scheduling transportation through the Dept of [facility] Human [facility]. This provider printed out and completed the necessary forms for transportation and emailed them to the [facility] email address. This provider asked patient about patient's recent opthalmology appointment. Patient reports that the medical doctor noticed that patient had a stroke in the right eye but couldn't determine when it may have happened. This provider ensured that patient's transportation for today worked out. This provider also confirmed patient's dialysis days which are back to Tues, Thurs and Saturday 2:30am-6:00pm                     NAREN Davidson

## 2025-08-07 NOTE — ASSESSMENT & PLAN NOTE
PCIOL OU. 1+ central PCO OD. Both IOL have paracentral posterior capsular scarring.  Pt educated on findings. Monitor only at this time. Pt voiced understanding.

## 2025-08-07 NOTE — ASSESSMENT & PLAN NOTE
Stage 3 hypertensive retinopathy OU. Pt reports recent uncontrolled blood pressure. Previously had diabetes - but has CKD now.   See plan for BRAO OD.

## 2025-08-07 NOTE — PROGRESS NOTES
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 BCVA OD CF @ 1Ft. DFE shows BRAO OD along inferior arcade, inferior macula.   Scattered blot hemes OD/OS.  Pt educated on findings. Pt under care with cardiology - does not have an appointment in place - suggested to schedule ASAP. Will also schedule for next appt with retina for evaluation and care. Discussed possible permanent changes to vision even after resolution of acute retinal thickening. Pt voiced understanding.          Relevant Orders    OCT, Retina - OU - Both Eyes (Completed)    Color Fundus Photography - OU - Both Eyes (Completed)    Hypertensive retinopathy of both eyes    Stage 3 hypertensive retinopathy OU. Pt reports recent uncontrolled blood pressure. Previously had diabetes - but has CKD now.   See plan for BRAO OD.          Relevant Orders    OCT, Retina - OU - Both Eyes (Completed)    Color Fundus Photography - OU - Both Eyes (Completed)    Myopia of both eyes with astigmatism and presbyopia - Primary    Mild Rx. BCVA limited OD due to BRAO and retinal thickening. Areas of parafoveal CME OS.  Pt educated on findings. At this time, recommend holding off on finalizing Rx. Will refer to retina for next available. Pt voiced understanding.         Pseudophakia of both eyes    PCIOL OU. 1+ central PCO OD. Both IOL have paracentral posterior capsular scarring.  Pt educated on findings. Monitor only at this time. Pt voiced understanding.

## 2025-08-13 ENCOUNTER — DOCUMENTATION (OUTPATIENT)
Dept: BEHAVIORAL HEALTH | Facility: CLINIC | Age: 55
End: 2025-08-13
Payer: COMMERCIAL

## 2025-08-13 DIAGNOSIS — F41.9 ANXIETY: ICD-10-CM

## 2025-08-22 ENCOUNTER — APPOINTMENT (OUTPATIENT)
Dept: OPHTHALMOLOGY | Facility: CLINIC | Age: 55
End: 2025-08-22
Payer: COMMERCIAL

## 2025-08-22 DIAGNOSIS — H35.033 HYPERTENSIVE RETINOPATHY OF BOTH EYES: ICD-10-CM

## 2025-08-22 DIAGNOSIS — I16.1 HYPERTENSIVE EMERGENCY: Primary | ICD-10-CM

## 2025-08-22 DIAGNOSIS — H34.11 CENTRAL RETINAL ARTERY OCCLUSION OF RIGHT EYE: ICD-10-CM

## 2025-08-22 DIAGNOSIS — H52.4 MYOPIA OF BOTH EYES WITH ASTIGMATISM AND PRESBYOPIA: ICD-10-CM

## 2025-08-22 DIAGNOSIS — H52.13 MYOPIA OF BOTH EYES WITH ASTIGMATISM AND PRESBYOPIA: ICD-10-CM

## 2025-08-22 DIAGNOSIS — H52.203 MYOPIA OF BOTH EYES WITH ASTIGMATISM AND PRESBYOPIA: ICD-10-CM

## 2025-08-22 DIAGNOSIS — I82.722 CHRONIC DEEP VEIN THROMBOSIS (DVT) OF BRACHIAL VEIN OF LEFT UPPER EXTREMITY: ICD-10-CM

## 2025-08-22 RX ORDER — FLUORESCEIN 500 MG/ML
500 INJECTION INTRAVENOUS
Status: COMPLETED | OUTPATIENT
Start: 2025-08-22 | End: 2025-08-22

## 2025-08-22 RX ORDER — NIFEDIPINE 90 MG/1
90 TABLET, EXTENDED RELEASE ORAL
COMMUNITY
Start: 2025-08-20 | End: 2025-11-18

## 2025-08-22 RX ORDER — BUDESONIDE AND FORMOTEROL FUMARATE DIHYDRATE 80; 4.5 UG/1; UG/1
2 AEROSOL RESPIRATORY (INHALATION) 2 TIMES DAILY
COMMUNITY
Start: 2025-08-20

## 2025-08-22 RX ORDER — MINOXIDIL 2.5 MG/1
2.5 TABLET ORAL 2 TIMES DAILY
COMMUNITY
Start: 2025-08-20 | End: 2025-11-18

## 2025-08-22 RX ADMIN — FLUORESCEIN 500 MG: 500 INJECTION INTRAVENOUS at 14:04

## 2025-08-22 ASSESSMENT — VISUAL ACUITY
OS_PH_SC: 20/30
METHOD: SNELLEN - LINEAR
OS_SC+: -2
OS_SC: 20/60
OS_PH_SC+: -1
OD_SC: CF CLOSE

## 2025-08-22 ASSESSMENT — ENCOUNTER SYMPTOMS
NEUROLOGICAL NEGATIVE: 0
HEMATOLOGIC/LYMPHATIC NEGATIVE: 0
CONSTITUTIONAL NEGATIVE: 0
EYES NEGATIVE: 1
MUSCULOSKELETAL NEGATIVE: 0
PSYCHIATRIC NEGATIVE: 0
RESPIRATORY NEGATIVE: 0
ENDOCRINE NEGATIVE: 1
CARDIOVASCULAR NEGATIVE: 0
GASTROINTESTINAL NEGATIVE: 0
ALLERGIC/IMMUNOLOGIC NEGATIVE: 0

## 2025-08-22 ASSESSMENT — TONOMETRY
OS_IOP_MMHG: 19
OD_IOP_MMHG: 19
IOP_METHOD: GOLDMANN APPLANATION

## 2025-08-22 ASSESSMENT — CUP TO DISC RATIO
OS_RATIO: 0.4
OD_RATIO: 0.4

## 2025-08-22 ASSESSMENT — SLIT LAMP EXAM - LIDS
COMMENTS: GOOD POSITION
COMMENTS: GOOD POSITION

## 2025-08-22 ASSESSMENT — EXTERNAL EXAM - LEFT EYE: OS_EXAM: NORMAL

## 2025-08-22 ASSESSMENT — EXTERNAL EXAM - RIGHT EYE: OD_EXAM: NORMAL

## 2025-09-03 ENCOUNTER — CLINICAL SUPPORT (OUTPATIENT)
Facility: HOSPITAL | Age: 55
End: 2025-09-03
Payer: COMMERCIAL

## 2025-09-03 ENCOUNTER — PATIENT OUTREACH (OUTPATIENT)
Dept: BEHAVIORAL HEALTH | Facility: CLINIC | Age: 55
End: 2025-09-03

## 2025-09-05 ENCOUNTER — APPOINTMENT (OUTPATIENT)
Dept: OPHTHALMOLOGY | Facility: CLINIC | Age: 55
End: 2025-09-05
Payer: COMMERCIAL

## 2025-09-05 PROBLEM — E11.3513: Status: ACTIVE | Noted: 2025-09-05

## 2025-09-05 ASSESSMENT — EXTERNAL EXAM - RIGHT EYE: OD_EXAM: NORMAL

## 2025-09-05 ASSESSMENT — SLIT LAMP EXAM - LIDS
COMMENTS: GOOD POSITION
COMMENTS: GOOD POSITION

## 2025-09-05 ASSESSMENT — CUP TO DISC RATIO
OD_RATIO: 0.4
OS_RATIO: 0.4

## 2025-09-05 ASSESSMENT — EXTERNAL EXAM - LEFT EYE: OS_EXAM: NORMAL

## 2025-09-05 ASSESSMENT — TONOMETRY
IOP_METHOD: GOLDMANN APPLANATION
OS_IOP_MMHG: 16
OD_IOP_MMHG: 17

## 2025-09-05 ASSESSMENT — VISUAL ACUITY
OD_SC: CF
METHOD: SNELLEN - LINEAR
OS_PH_SC: 20/30-1
OS_SC: 20/40-1

## 2025-09-05 ASSESSMENT — ENCOUNTER SYMPTOMS: EYES NEGATIVE: 1

## 2025-11-11 ENCOUNTER — APPOINTMENT (OUTPATIENT)
Dept: OPHTHALMOLOGY | Facility: CLINIC | Age: 55
End: 2025-11-11
Payer: COMMERCIAL